# Patient Record
Sex: FEMALE | Race: BLACK OR AFRICAN AMERICAN | Employment: OTHER | URBAN - METROPOLITAN AREA
[De-identification: names, ages, dates, MRNs, and addresses within clinical notes are randomized per-mention and may not be internally consistent; named-entity substitution may affect disease eponyms.]

---

## 2012-10-08 DEVICE — BARDPORT M.R.I. IMPLANTABLE PORT WITH ATTACHABLE 9.6F OPEN-ENDED SINGLE-LUMEN VENOUS CATHETER INTERMEDIATE KIT (WITH PEEL-APART INTRODUCER)
Type: IMPLANTABLE DEVICE | Status: NON-FUNCTIONAL
Brand: BARDPORT M.R.I.
Removed: 2017-12-08

## 2017-01-03 ENCOUNTER — TELEPHONE (OUTPATIENT)
Dept: GYNECOLOGY | Age: 57
End: 2017-01-03

## 2017-01-03 NOTE — TELEPHONE ENCOUNTER
S/w pt, she states she has been feeling week, light headed and nausea last week, she states she had an xray with pulmonary doctor and show a nodule on the left side, wants to know what her next steps are

## 2017-01-04 NOTE — TELEPHONE ENCOUNTER
Per Dr. Mike Andrade, he agrees to have CT scan done, he will put in order and I advised pt that Page will call her to set up and then we will make her an appt to see Dr. Mike Andrade after Ct scan to review and go over next steps, pt verbalized understanding

## 2017-01-09 DIAGNOSIS — C56.9 MALIGNANT NEOPLASM OF OVARY, UNSPECIFIED LATERALITY (HCC): ICD-10-CM

## 2017-01-09 DIAGNOSIS — R97.1 ELEVATED CA-125: ICD-10-CM

## 2017-01-09 DIAGNOSIS — E66.01 MORBID OBESITY DUE TO EXCESS CALORIES (HCC): ICD-10-CM

## 2017-01-09 DIAGNOSIS — C80.0 CARCINOMATOSIS (HCC): Primary | ICD-10-CM

## 2017-01-10 ENCOUNTER — TELEPHONE (OUTPATIENT)
Dept: GYNECOLOGY | Age: 57
End: 2017-01-10

## 2017-01-10 NOTE — TELEPHONE ENCOUNTER
S/w pt, she states she has not heard from page to schedule her scan, I checked with Gini and she does have the order, she will call her this week with date and time, she also states that her father passed away on 1/9/17

## 2017-01-13 ENCOUNTER — HOSPITAL ENCOUNTER (OUTPATIENT)
Dept: CT IMAGING | Age: 57
Discharge: HOME | End: 2017-01-13
Attending: OBSTETRICS & GYNECOLOGY
Payer: MEDICARE

## 2017-01-13 DIAGNOSIS — C56.9 MALIGNANT NEOPLASM OF OVARY, UNSPECIFIED LATERALITY (HCC): ICD-10-CM

## 2017-01-13 DIAGNOSIS — C80.0 CARCINOMATOSIS (HCC): ICD-10-CM

## 2017-01-13 DIAGNOSIS — R97.1 ELEVATED CA-125: ICD-10-CM

## 2017-01-13 DIAGNOSIS — E66.01 MORBID OBESITY DUE TO EXCESS CALORIES (HCC): ICD-10-CM

## 2017-01-13 PROCEDURE — 74176 CT ABD & PELVIS W/O CONTRAST: CPT

## 2017-01-13 RX ORDER — VALSARTAN 160 MG/1
TABLET ORAL
Qty: 30 TAB | Refills: 11 | Status: SHIPPED | OUTPATIENT
Start: 2017-01-13 | End: 2017-08-02

## 2017-01-18 ENCOUNTER — OFFICE VISIT (OUTPATIENT)
Dept: GYNECOLOGY | Age: 57
End: 2017-01-18

## 2017-01-18 VITALS
HEIGHT: 69 IN | HEART RATE: 76 BPM | BODY MASS INDEX: 43.4 KG/M2 | DIASTOLIC BLOOD PRESSURE: 78 MMHG | WEIGHT: 293 LBS | SYSTOLIC BLOOD PRESSURE: 129 MMHG

## 2017-01-18 DIAGNOSIS — C80.0 CARCINOMATOSIS (HCC): Primary | ICD-10-CM

## 2017-01-18 DIAGNOSIS — E66.01 MORBID OBESITY DUE TO EXCESS CALORIES (HCC): ICD-10-CM

## 2017-01-18 DIAGNOSIS — C56.9 MALIGNANT NEOPLASM OF OVARY, UNSPECIFIED LATERALITY (HCC): ICD-10-CM

## 2017-01-18 DIAGNOSIS — R97.1 ELEVATED CA-125: ICD-10-CM

## 2017-01-18 RX ORDER — CIPROFLOXACIN 500 MG/1
500 TABLET ORAL 2 TIMES DAILY
Qty: 14 TAB | Refills: 0 | Status: SHIPPED | OUTPATIENT
Start: 2017-01-18 | End: 2017-02-28 | Stop reason: ALTCHOICE

## 2017-01-18 NOTE — PROGRESS NOTES
Ct scan results, pt states she is having urinary frequency, blood in her urine that is mainly present in the mornings, pain in bilateral legs

## 2017-01-18 NOTE — PROGRESS NOTES
[facility] [address], [address]  [phone]  MD Zuleyka Headley MD      Patient ID:  Name: Emmanuel Flannery  MRN: [id number]  : 1960/53 y.o. Visit date: 2017     Acute Diagnosis:   Vaginal spotting   Persistent EOC    HISTORY:  49 y.o.  female with a diagnosis of ovarian cancer. Her original surgery: 12 Ex-lap, ROULA/BSO, radical tumor debulking. Pathology:  High grade serious ovarian cancer with omental cake and carcinomatosis. Stage IIIC. She received Taxol/carboplatin (10/2012 - 2013). She recurred in mid  noted CT with Peritoneal carcinomatosis with slightly increased size of the peritoneal implants throughout the abdomen, Pericardial lymph nodes unchanged and Bilateral pelvic adenopathy increased in size. Initiated retreatment with Taxol/carboplatin (2014-2014) with HSR. She was then monitored until progression, initiated on Doxil/Avastin (3/2014), did poorly, though MRI following cycle 3 showed extensive progression. She has receive Topotecan palliation. Cycle: 2 abbreviated by thrombocytopenia. Component      Latest Ref Rng 10/17/2016 2016 2016 2016           2:48 PM  2:40 PM  3:06 PM  9:53 AM   Cancer Ag (CA) 125      0.0 - 34.0 U/mL    172.1 (H)   CA-125      0 - 30 U/mL 176 (H) 173 (H) 208 (H)      : 2016:    265    IV port placed by IRS/P IV Topotecan #2. Abbreviated secondary to myelosuppression(platelets)  Cycle three cancelled due to UTI and family death  Last chemotherapy 2016    EOD CT: 2017  FINDINGS:   Solid organ evaluation is limited without contrast.      Multiple scattered intraperitoneal soft tissue nodules are overall increased. A  representative soft tissue mass in the pelvis adjacent to the rectum measures  3.6 x 3.8 cm (series 3, image 77), previously 1.6 x 1.9 cm.  A representative  soft tissue nodule in the left hemipelvis measures 3.2 x 3.3 cm (series 3, image  70), previously 2.5 x 2.9 cm. An adjacent soft tissue nodule laterally measures  3.4 x 4.7 cm (series 3, image 72), previously 2.7 x 4.2 cm. A representative  conglomerate of soft tissue nodules adjacent to the pancreatic tail measures 3.6  x 4.8 cm (series 3, image 21), previously 3.2 x 3.4 cm.     A right inguinal lymph node measures 2.2 x 3.1 cm (series 3, image 83),  previously 1.8 x 2.6 cm.      The visualized lung bases demonstrate no mass or consolidation. The heart size  is normal. There is no pericardial or pleural effusion.     A 1.2 cm calculus is again seen in the left renal pelvis with new mild left  hydronephrosis and mild fat stranding adjacent to the left renal hilum. Several  small nonobstructing calculi are also seen bilaterally. The urinary bladder is  grossly normal.     The liver, spleen, pancreas, and adrenal glands are normal. The gall bladder is  present without intra- or extra-hepatic biliary dilatation.      There are no dilated bowel loops. There is no free fluid or free air. The aorta  tapers without aneurysm.     The uterus and ovaries are surgically absent. There is no pelvic mass. There are  stable degenerative changes in the spine. There is no aggressive blastic or  lytic bony lesion.     IMPRESSION  IMPRESSION:   1. Overall progression of metastatic disease with multiple intraperitoneal soft  tissue implants. There is no ascites. There is also increased right inguinal  lymphadenopathy.     2. At 1.2 cm calculus is again seen in the left renal collecting system. There  is new mild left hydronephrosis and inflammation adjacent to the left renal  hilum. Correlation with urinalysis for infection is suggested.     The patient reports episodic, painless vaginal spotting, stringy clots. Denies dysuria, hematuria. Urinary incontinence continues, vaginal discharge, N/V, F/C. Ongoing dental evaluation for ? ? infection.  Placed on antibiotics  Negative  and GI review for dysfunction  Negative cardiopulmonary review  No weight loss    PMH:  Past Medical History   Diagnosis Date    Anemia     Arthritis      DDD low back and knees    Asthma      uses albuterol prn only; none x 6 mos. Never hospitalized    BRCA negative 2013    Calculus of kidney     Chronic kidney disease      kidney stones    CINV (chemotherapy-induced nausea and vomiting) 2014    Diabetes ([address] Age 46     IDDM    Environmental allergies     Fibromyalgia     GERD (gastroesophageal reflux disease)      controlled with med    Hypertension     Ill-defined condition      Sepsis     Morbid obesity ([address]     Other and unspecified hyperlipidemia     Ovarian cancer ([address] 2012, 2014     serous, high grade, stage IIIc. s/p chemotx (has port)    Rectal bleeding     Thromboembolus ([address]      POST PARTUM; resolved- PELVIS    Thyroid disease      HYPOTHYROID    Unspecified sleep apnea      CPAP, Dr. Huma Bauer     PSH:  Past Surgical History   Procedure Laterality Date    Hx orthopaedic       ankle-FX, R    Hx orthopaedic       FX R ARM    Pr breast surgery procedure unlisted       cysts in breast    Hx gyn        X2    Hx hysterectomy  2012     ROULA/BSO, tumor debulking, omentectomy for ovarian cancer    Hx vascular access  2015     right chest- port placed     Medications:  Current Outpatient Prescriptions on File Prior to Visit   Medication Sig Dispense Refill    valsartan (DIOVAN) 160 mg tablet TAKE 1 TAB BY MOUTH DAILY. REPLACES VALSARTAN-HCTZ 30 Tab 11    LEVEMIR FLEXTOUCH 100 unit/mL (3 mL) inpn 30 UNITS IN THE MORNING AND 50 UNITS AT NIGHT. INCREASE AS DIRECTED TO MAX  UNITS PER DAY 30 mL 11    oxyCODONE-acetaminophen (PERCOCET 7.5) 7.5-325 mg per tablet Take 1 Tab by mouth every four (4) hours as needed for Pain. Max Daily Amount: 6 Tabs.  50 Tab 0    omega-3 acid ethyl esters (LOVAZA) 1 gram capsule TAKE ONE CAPSULE BY MOUTH TWICE A  Cap 3    buPROPion XL (WELLBUTRIN XL) 150 mg tablet Take 1 Tab by mouth every morning. 90 Tab 2    montelukast (SINGULAIR) 10 mg tablet Take 1 Tab by mouth daily. 90 Tab 2    levothyroxine (SYNTHROID) 150 mcg tablet TAKE 1 TAB BY MOUTH DAILY (BEFORE BREAKFAST). 90 Tab 3    Lancets misc Use as directed to test blood sugar three times daily-DX-DM-250.00 270 Each 3    SYMBICORT 160-4.5 mcg/actuation HFA inhaler   3    fluticasone-salmeterol (ADVAIR HFA) 230-21 mcg/actuation inhaler Take 2 Puffs by inhalation two (2) times a day. 12 g 5    BD INSULIN PEN NEEDLE UF SHORT 31 gauge x 5/16\" ndle USE WITH INSULIN TO INJECT FIVE TIMES DAILY. 200 Pen Needle 11    albuterol (PROVENTIL VENTOLIN) 2.5 mg /3 mL (0.083 %) nebulizer solution 3 mL by Nebulization route every four (4) hours as needed for Wheezing. 24 Each 11    Liraglutide (VICTOZA) 0.6 mg/0.1 mL (18 mg/3 mL) sub-q pen 0.3 mL by SubCUTAneous route daily (with lunch). 3 Each 11    NOVOLOG FLEXPEN 100 unit/mL inpn INJECT 20 UNITS BEFORE EACH MEAL + 4 UNITS FOR EVERY 50 MG/DL ABOVE 150 MG/DL.  UNITS PER DAY 30 mL 11    LORazepam (ATIVAN) 1 mg tablet Take 1 Tab by mouth every six (6) hours as needed for Anxiety. Max Daily Amount: 4 mg. Indications: CANCER CHEMOTHERAPY-INDUCED NAUSEA AND VOMITING 40 Tab 1    potassium chloride SR (KLOR-CON 10) 10 mEq tablet Take 1 Tab by mouth two (2) days a week. 30 Tab 1    cholecalciferol, VITAMIN D3, (VITAMIN D3) 5,000 unit tab tablet Take  by mouth daily.  furosemide (LASIX) 40 mg tablet Take 80 mg by mouth daily. 3    Blood-Glucose Meter monitoring kit Use as directed to test blood sugar three times daily-DX-DM-250.00 1 Kit 0    albuterol (PROVENTIL HFA, VENTOLIN HFA, PROAIR HFA) 90 mcg/actuation inhaler Take 2 Puffs by inhalation every four (4) hours as needed for Wheezing.  Bifidobacterium Infantis (ALIGN) 4 mg cap Take 1 Cap by mouth daily.       ondansetron [facility] ODT) 8 mg disintegrating tablet Take 1 Tab by mouth every eight (8) hours as needed for Nausea. 30 Tab 1    Dexlansoprazole (DEXILANT) 60 mg CpDM Take 1 Cap by mouth daily. 30 Cap 6    fexofenadine (ALLEGRA) 180 mg tablet Take 180 mg by mouth daily.  albuterol sulfate (PROVENTIL;VENTOLIN) 2.5 mg/0.5 mL Nebu 2.5 mg by Nebulization route as needed.  mometasone (NASONEX) 50 mcg/actuation nasal spray 2 sprays by Both Nostrils route as needed. No current facility-administered medications on file prior to visit. Allergies   Allergen Reactions    Carboplatin Other (comments)     Patient developed shortness of breath, felt faint, coughing, skin flushed and \"itchy\". This occurred on the patients 8th treatment.  Iodinated Contrast Media - Oral And Iv Dye Rash    Lipitor [Atorvastatin] Myalgia    Percocet [Oxycodone-Acetaminophen] Other (comments)     fever    Shellfish Containing Products Hives     ROS:  Negative    OBJECTIVE:  Vitals: Kym Keith Vitals:    01/18/17 1157   BP: 129/78   Pulse: 76   Weight: 340 lb 9.6 oz (154.5 kg)   Height: 5' 9\" (1.753 m)        Office Examination:    General appearance - alert, well appearing, and in no distress  Eyes - clear  Lymphatics - no palpable lymphadenopathy  Heart - normal rate and regular rhythm, S1 and S2 normal  Pulmonary - Clear to ausculation and percussion  Abdomen - rotund, soft, nontender, nondistended, no masses or organomegaly/fluid wave  Pelvic - Vulva: No gross lesion, BUS negative   Vagina: No lesion, blood. No suspicious nodularity or induration. Incontinent urine clean. Bimanual: No suspicious mass, tenderness---limited examination  Rectal - rectal exam not indicated  Back exam - no CVAT  Extrem: Mild upper extremity edema, L>R, good pulses.  Nontender      DATE REVIEW as available:  Lab Results   Component Value Date/Time    WBC 9.0 12/12/2016 01:54 PM    HGB 9.0 12/12/2016 01:54 PM    HCT 28.8 12/12/2016 01:54 PM    PLATELET 346 12/12/2016 01:54 PM    MCV 90.9 12/12/2016 01:54 PM     Lab Results   Component Value Date/Time    Sodium 145 12/12/2016 01:54 PM    Potassium 4.5 12/12/2016 01:54 PM    Chloride 111 12/12/2016 01:54 PM    CO2 24 12/12/2016 01:54 PM    Anion gap 10 12/12/2016 01:54 PM    Glucose 151 12/12/2016 01:54 PM    BUN 28 12/12/2016 01:54 PM    Creatinine 1.47 12/12/2016 01:54 PM    BUN/Creatinine ratio 19 12/12/2016 01:54 PM    GFR est AA 45 12/12/2016 01:54 PM    GFR est non-AA 37 12/12/2016 01:54 PM    Calcium 8.8 12/12/2016 01:54 PM    Bilirubin, total 0.3 12/12/2016 01:54 PM    ALT 29 12/12/2016 01:54 PM    AST 36 12/12/2016 01:54 PM    Alk. phosphatase 99 12/12/2016 01:54 PM    Protein, total 8.6 12/12/2016 01:54 PM    Albumin 3.2 12/12/2016 01:54 PM    Globulin 5.4 12/12/2016 01:54 PM    A-G Ratio 0.6 12/12/2016 01:54 PM         IMPRESSION AND PLAN:    Melina Bales has a working diagnosis of EOC. EOD CT and  elevations would be consistent with recurrence. She is S/P six cycles of Carbo/Taxol retreatment due to prolonged DFI. Elevated . CT suggestive of progressive disease. S/P Doxil/Avastin #3. S/P Topotecan #3, complicated by anemia and thrombocytopenia  No further chemotherapy since 4/2016    Recommend resumption of chemotherapy  With review of her chemotherapy history I have recommended IV Gemzar/Avastin. Initially start Gemzar on day 1 only due to history of myelosuppression  Toxicities noted  Palliative nature of treatment noted    All questions answered.   Will schedule    UTI: Rx--      Sara Machuca MD  1/18/2017

## 2017-01-20 ENCOUNTER — TELEPHONE (OUTPATIENT)
Dept: GYNECOLOGY | Age: 57
End: 2017-01-20

## 2017-01-20 LAB
ALBUMIN SERPL-MCNC: 3.8 G/DL (ref 3.5–5.5)
ALBUMIN/GLOB SERPL: 0.9 {RATIO} (ref 1.1–2.5)
ALP SERPL-CCNC: 78 IU/L (ref 39–117)
ALT SERPL-CCNC: 19 IU/L (ref 0–32)
AST SERPL-CCNC: 27 IU/L (ref 0–40)
BASOPHILS # BLD AUTO: 0 X10E3/UL (ref 0–0.2)
BASOPHILS NFR BLD AUTO: 0 %
BILIRUB SERPL-MCNC: 0.2 MG/DL (ref 0–1.2)
BUN SERPL-MCNC: 36 MG/DL (ref 6–24)
BUN/CREAT SERPL: 29 (ref 9–23)
CALCIUM SERPL-MCNC: 9.5 MG/DL (ref 8.7–10.2)
CANCER AG125 SERPL-ACNC: 353.3 U/ML (ref 0–38.1)
CHLORIDE SERPL-SCNC: 106 MMOL/L (ref 96–106)
CO2 SERPL-SCNC: 20 MMOL/L (ref 18–29)
CREAT SERPL-MCNC: 1.24 MG/DL (ref 0.57–1)
EOSINOPHIL # BLD AUTO: 0.2 X10E3/UL (ref 0–0.4)
EOSINOPHIL NFR BLD AUTO: 2 %
ERYTHROCYTE [DISTWIDTH] IN BLOOD BY AUTOMATED COUNT: 14.3 % (ref 12.3–15.4)
GLOBULIN SER CALC-MCNC: 4.4 G/DL (ref 1.5–4.5)
GLUCOSE SERPL-MCNC: 160 MG/DL (ref 65–99)
HCT VFR BLD AUTO: 29.3 % (ref 34–46.6)
HGB BLD-MCNC: 9.2 G/DL (ref 11.1–15.9)
IMM GRANULOCYTES # BLD: 0 X10E3/UL (ref 0–0.1)
IMM GRANULOCYTES NFR BLD: 0 %
LYMPHOCYTES # BLD AUTO: 2.8 X10E3/UL (ref 0.7–3.1)
LYMPHOCYTES NFR BLD AUTO: 30 %
MCH RBC QN AUTO: 28 PG (ref 26.6–33)
MCHC RBC AUTO-ENTMCNC: 31.4 G/DL (ref 31.5–35.7)
MCV RBC AUTO: 89 FL (ref 79–97)
MONOCYTES # BLD AUTO: 0.5 X10E3/UL (ref 0.1–0.9)
MONOCYTES NFR BLD AUTO: 5 %
NEUTROPHILS # BLD AUTO: 5.8 X10E3/UL (ref 1.4–7)
NEUTROPHILS NFR BLD AUTO: 63 %
PLATELET # BLD AUTO: 227 X10E3/UL (ref 150–379)
POTASSIUM SERPL-SCNC: 4.5 MMOL/L (ref 3.5–5.2)
PROT SERPL-MCNC: 8.2 G/DL (ref 6–8.5)
RBC # BLD AUTO: 3.28 X10E6/UL (ref 3.77–5.28)
SODIUM SERPL-SCNC: 141 MMOL/L (ref 134–144)
WBC # BLD AUTO: 9.3 X10E3/UL (ref 3.4–10.8)

## 2017-01-20 RX ORDER — ONDANSETRON 8 MG/1
8 TABLET, ORALLY DISINTEGRATING ORAL
Qty: 30 TAB | Refills: 1 | Status: SHIPPED | OUTPATIENT
Start: 2017-01-20 | End: 2017-08-02 | Stop reason: SDUPTHER

## 2017-01-20 NOTE — TELEPHONE ENCOUNTER
Requested Prescriptions     Signed Prescriptions Disp Refills    ondansetron (ZOFRAN ODT) 8 mg disintegrating tablet 30 Tab 1     Sig: Take 1 Tab by mouth every eight (8) hours as needed for Nausea.      Authorizing Provider: Jessie Cook     Ordering User: Yobany Middleton     Rx sent to pharmacy per vo Dr. Allen Bee for nausea during chemo

## 2017-01-23 DIAGNOSIS — C56.9 OVARIAN CANCER, UNSPECIFIED LATERALITY (HCC): Primary | ICD-10-CM

## 2017-01-27 ENCOUNTER — TELEPHONE (OUTPATIENT)
Dept: GYNECOLOGY | Age: 57
End: 2017-01-27

## 2017-01-27 NOTE — TELEPHONE ENCOUNTER
Pt informed her insurance denied Gemzar/Avastin as she has had it in the past and they feel she failed it. Therefore Dr. Vandana Estrella is ordering Gemzar D1 and D8 every 28 days. I will give pt a written copy of schedule when she comes in to sign consent for Gemzar.

## 2017-01-31 LAB
ALBUMIN SERPL-MCNC: 4 G/DL (ref 3.5–5.5)
ALBUMIN/GLOB SERPL: 0.9 {RATIO} (ref 1.1–2.5)
ALP SERPL-CCNC: 76 IU/L (ref 39–117)
ALT SERPL-CCNC: 18 IU/L (ref 0–32)
APPEARANCE UR: ABNORMAL
AST SERPL-CCNC: 30 IU/L (ref 0–40)
BACTERIA #/AREA URNS HPF: ABNORMAL /[HPF]
BASOPHILS # BLD AUTO: 0 X10E3/UL (ref 0–0.2)
BASOPHILS NFR BLD AUTO: 0 %
BILIRUB SERPL-MCNC: 0.3 MG/DL (ref 0–1.2)
BILIRUB UR QL STRIP: NEGATIVE
BUN SERPL-MCNC: 23 MG/DL (ref 6–24)
BUN/CREAT SERPL: 20 (ref 9–23)
CALCIUM SERPL-MCNC: 9.6 MG/DL (ref 8.7–10.2)
CANCER AG125 SERPL-ACNC: 347.1 U/ML (ref 0–38.1)
CASTS URNS MICRO: ABNORMAL
CASTS URNS QL MICRO: PRESENT /LPF
CHLORIDE SERPL-SCNC: 104 MMOL/L (ref 96–106)
CO2 SERPL-SCNC: 19 MMOL/L (ref 18–29)
COLOR UR: ABNORMAL
CREAT SERPL-MCNC: 1.17 MG/DL (ref 0.57–1)
EOSINOPHIL # BLD AUTO: 0.3 X10E3/UL (ref 0–0.4)
EOSINOPHIL NFR BLD AUTO: 3 %
EPI CELLS #/AREA URNS HPF: ABNORMAL /HPF
ERYTHROCYTE [DISTWIDTH] IN BLOOD BY AUTOMATED COUNT: 14.2 % (ref 12.3–15.4)
GLOBULIN SER CALC-MCNC: 4.5 G/DL (ref 1.5–4.5)
GLUCOSE SERPL-MCNC: 147 MG/DL (ref 65–99)
GLUCOSE UR QL: NEGATIVE
HCT VFR BLD AUTO: 29.8 % (ref 34–46.6)
HGB BLD-MCNC: 9.5 G/DL (ref 11.1–15.9)
HGB UR QL STRIP: ABNORMAL
IMM GRANULOCYTES # BLD: 0 X10E3/UL (ref 0–0.1)
IMM GRANULOCYTES NFR BLD: 0 %
KETONES UR QL STRIP: NEGATIVE
LEUKOCYTE ESTERASE UR QL STRIP: ABNORMAL
LYMPHOCYTES # BLD AUTO: 2.9 X10E3/UL (ref 0.7–3.1)
LYMPHOCYTES NFR BLD AUTO: 31 %
MCH RBC QN AUTO: 28.3 PG (ref 26.6–33)
MCHC RBC AUTO-ENTMCNC: 31.9 G/DL (ref 31.5–35.7)
MCV RBC AUTO: 89 FL (ref 79–97)
MICRO URNS: ABNORMAL
MONOCYTES # BLD AUTO: 0.7 X10E3/UL (ref 0.1–0.9)
MONOCYTES NFR BLD AUTO: 7 %
MUCOUS THREADS URNS QL MICRO: PRESENT
NEUTROPHILS # BLD AUTO: 5.5 X10E3/UL (ref 1.4–7)
NEUTROPHILS NFR BLD AUTO: 59 %
NITRITE UR QL STRIP: NEGATIVE
PH UR STRIP: 6 [PH] (ref 5–7.5)
PLATELET # BLD AUTO: 222 X10E3/UL (ref 150–379)
POTASSIUM SERPL-SCNC: 4.5 MMOL/L (ref 3.5–5.2)
PROT SERPL-MCNC: 8.5 G/DL (ref 6–8.5)
PROT UR QL STRIP: ABNORMAL
RBC # BLD AUTO: 3.36 X10E6/UL (ref 3.77–5.28)
RBC #/AREA URNS HPF: >30 /HPF
SODIUM SERPL-SCNC: 140 MMOL/L (ref 134–144)
SP GR UR: 1.02 (ref 1–1.03)
UROBILINOGEN UR STRIP-MCNC: 0.2 MG/DL (ref 0.2–1)
WBC # BLD AUTO: 9.4 X10E3/UL (ref 3.4–10.8)
WBC #/AREA URNS HPF: >30 /HPF

## 2017-01-31 RX ORDER — DEXAMETHASONE SODIUM PHOSPHATE 4 MG/ML
8 INJECTION, SOLUTION INTRA-ARTICULAR; INTRALESIONAL; INTRAMUSCULAR; INTRAVENOUS; SOFT TISSUE ONCE
Status: COMPLETED | OUTPATIENT
Start: 2017-02-02 | End: 2017-02-02

## 2017-01-31 RX ORDER — GRANISETRON HYDROCHLORIDE 1 MG/ML
1 INJECTION, SOLUTION INTRAVENOUS ONCE
Status: COMPLETED | OUTPATIENT
Start: 2017-02-02 | End: 2017-02-02

## 2017-02-01 ENCOUNTER — TELEPHONE (OUTPATIENT)
Dept: GYNECOLOGY | Age: 57
End: 2017-02-01

## 2017-02-02 ENCOUNTER — HOSPITAL ENCOUNTER (OUTPATIENT)
Dept: INFUSION THERAPY | Age: 57
Discharge: HOME | End: 2017-02-02
Payer: MEDICARE

## 2017-02-02 VITALS
HEART RATE: 67 BPM | SYSTOLIC BLOOD PRESSURE: 128 MMHG | DIASTOLIC BLOOD PRESSURE: 76 MMHG | WEIGHT: 293 LBS | OXYGEN SATURATION: 93 % | BODY MASS INDEX: 43.4 KG/M2 | RESPIRATION RATE: 18 BRPM | TEMPERATURE: 97 F | HEIGHT: 69 IN

## 2017-02-02 PROCEDURE — 74011000250 HC RX REV CODE- 250

## 2017-02-02 PROCEDURE — 74011000250 HC RX REV CODE- 250: Performed by: OBSTETRICS & GYNECOLOGY

## 2017-02-02 PROCEDURE — 74011250636 HC RX REV CODE- 250/636

## 2017-02-02 PROCEDURE — 96413 CHEMO IV INFUSION 1 HR: CPT

## 2017-02-02 PROCEDURE — 77030012965 HC NDL HUBR BBMI -A

## 2017-02-02 PROCEDURE — 74011250636 HC RX REV CODE- 250/636: Performed by: OBSTETRICS & GYNECOLOGY

## 2017-02-02 PROCEDURE — 96375 TX/PRO/DX INJ NEW DRUG ADDON: CPT

## 2017-02-02 RX ORDER — OXYCODONE AND ACETAMINOPHEN 7.5; 325 MG/1; MG/1
1 TABLET ORAL
Qty: 50 TAB | Refills: 0 | Status: SHIPPED | OUTPATIENT
Start: 2017-02-02 | End: 2017-03-12

## 2017-02-02 RX ORDER — HEPARIN 100 UNIT/ML
500 SYRINGE INTRAVENOUS AS NEEDED
Status: ACTIVE | OUTPATIENT
Start: 2017-02-02 | End: 2017-02-03

## 2017-02-02 RX ORDER — SODIUM CHLORIDE 9 MG/ML
10 INJECTION INTRAMUSCULAR; INTRAVENOUS; SUBCUTANEOUS AS NEEDED
Status: ACTIVE | OUTPATIENT
Start: 2017-02-02 | End: 2017-02-03

## 2017-02-02 RX ORDER — SODIUM CHLORIDE 9 MG/ML
25 INJECTION, SOLUTION INTRAVENOUS AS NEEDED
Status: DISPENSED | OUTPATIENT
Start: 2017-02-02 | End: 2017-02-03

## 2017-02-02 RX ORDER — SODIUM CHLORIDE 0.9 % (FLUSH) 0.9 %
10-40 SYRINGE (ML) INJECTION AS NEEDED
Status: ACTIVE | OUTPATIENT
Start: 2017-02-02 | End: 2017-02-03

## 2017-02-02 RX ADMIN — FAMOTIDINE 20 MG: 10 INJECTION INTRAVENOUS at 09:57

## 2017-02-02 RX ADMIN — SODIUM CHLORIDE 2192 MG: 900 INJECTION, SOLUTION INTRAVENOUS at 10:55

## 2017-02-02 RX ADMIN — Medication 10 ML: at 11:30

## 2017-02-02 RX ADMIN — SODIUM CHLORIDE 25 ML/HR: 900 INJECTION, SOLUTION INTRAVENOUS at 09:25

## 2017-02-02 RX ADMIN — Medication 500 UNITS: at 11:30

## 2017-02-02 RX ADMIN — Medication 10 ML: at 09:25

## 2017-02-02 RX ADMIN — SODIUM CHLORIDE 10 ML: 9 INJECTION, SOLUTION INTRAMUSCULAR; INTRAVENOUS; SUBCUTANEOUS at 09:25

## 2017-02-02 RX ADMIN — GRANISETRON HYDROCHLORIDE 1 MG: 1 INJECTION, SOLUTION INTRAVENOUS at 10:29

## 2017-02-02 RX ADMIN — DEXAMETHASONE SODIUM PHOSPHATE 8 MG: 4 INJECTION, SOLUTION INTRA-ARTICULAR; INTRALESIONAL; INTRAMUSCULAR; INTRAVENOUS; SOFT TISSUE at 10:14

## 2017-02-02 NOTE — PROGRESS NOTES
8047 Pt admit to [facility] for C1D1 Gemzar ambulatory in stable condition. Assessment completed. No new concerns voiced. Port accessed and flushed with positive blood return. NO labs drawn today, labs drawn prior to admission. Normal Saline started at [facility]. Medication ordered. Pre medications given. Patient education given for new medication, patient verbalized understanding. 1140 seen at chair side by Isaac Judge NP    Visit Vitals    /60    Pulse 82    Temp 97.2 °F (36.2 °C)    Resp 18    Ht 5' 8.5\" (1.74 m)    Wt 155.1 kg (342 lb)    LMP 09/07/2011    SpO2 94%    Breastfeeding No    BMI 51.24 kg/m2       Medications:  Normal Saline   Pepcid  Dexamethasone  Granisetron   Gemzar  Heparin Flush    1145 Pt tolerated treatment well. Port maintained positive blood return throughout treatment, flushed with positive blood return at conclusion and heparinized and de accessed. D/c home ambulatory in no distress. Pt aware of next appointment scheduled for 2/9/17.

## 2017-02-02 NOTE — PROGRESS NOTES
[address]  Medical Oncology       Date of visit: 2/2/2017       Subjective:   Ms. Gin Paz is a long time pt of Dr. Mo Freeman. She was initially dx with ovarian cancer in Sept 2012. She had MARAH/BSO/Cytoreductive surgery on 9/21/12. At that time she was Stage III C. She recovered and began 6 cycles of Carbo/Taxol. Had good response, but reoccurred and in Fall of 2014 she received 3 cycles of Carbo/Taxol before having a reaction to Carbo and this was stopped. She continued her next 3 cycles with Doxil/Avastin. This did not decrease disease. .   She had a treatment break from 3/2015 until she restarted with Topatecan for 11 cycles from July 2015 through March 2016. She was followed by Dr. Mo Freeman and in Jan 2017, she had a CT scan that showed:   IMPRESSION:   1. Overall progression of metastatic disease with multiple intraperitoneal soft  tissue implants. There is no ascites. There is also increased right inguinal  lymphadenopathy. At this, pt agreed to proceed with new regimen. Dr. Mo Freeman recommended Gemzar single agent. She is here today for her first round of treatment with Gemzar  She has reviewed her teachin of Gemzar and denies questions at present. Of note, she has been found on CT to have:  At 1.2 cm calculus is again seen in the left renal collecting system. There is new mild left hydronephrosis and inflammation adjacent to the left renal hilum. Correlation with urinalysis for infection is suggested.e  renal stones and her urinalysis showed. She has an appointment with her urologist next week to follow up on this. Her son is with her today and supportive. Diagnosis: Ovarian cancer  Original Surgery: 9/21/12 Ex-lap, ROULA/BSO, radical tumor debulking  Pathology: High grade serious ovarian cancer with omental cake and carcinomatosis.  Stage IIIC  Preop CA-125: 147 units      Current Outpatient Prescriptions   Medication Sig    oxyCODONE-acetaminophen (PERCOCET 7.5) 7.5-325 mg per tablet Take 1 Tab by mouth every four (4) hours as needed for Pain. Max Daily Amount: 6 Tabs.  ondansetron (ZOFRAN ODT) 8 mg disintegrating tablet Take 1 Tab by mouth every eight (8) hours as needed for Nausea.  ciprofloxacin HCl (CIPRO) 500 mg tablet Take 1 Tab by mouth two (2) times a day.  valsartan (DIOVAN) 160 mg tablet TAKE 1 TAB BY MOUTH DAILY. REPLACES VALSARTAN-HCTZ    LEVEMIR FLEXTOUCH 100 unit/mL (3 mL) inpn 30 UNITS IN THE MORNING AND 50 UNITS AT NIGHT. INCREASE AS DIRECTED TO MAX  UNITS PER DAY    omega-3 acid ethyl esters (LOVAZA) 1 gram capsule TAKE ONE CAPSULE BY MOUTH TWICE A DAY    buPROPion XL (WELLBUTRIN XL) 150 mg tablet Take 1 Tab by mouth every morning.  montelukast (SINGULAIR) 10 mg tablet Take 1 Tab by mouth daily.  levothyroxine (SYNTHROID) 150 mcg tablet TAKE 1 TAB BY MOUTH DAILY (BEFORE BREAKFAST).  Lancets misc Use as directed to test blood sugar three times daily-DX-DM-250.00    SYMBICORT 160-4.5 mcg/actuation HFA inhaler     fluticasone-salmeterol (ADVAIR HFA) 230-21 mcg/actuation inhaler Take 2 Puffs by inhalation two (2) times a day.  BD INSULIN PEN NEEDLE UF SHORT 31 gauge x 5/16\" ndle USE WITH INSULIN TO INJECT FIVE TIMES DAILY.  albuterol (PROVENTIL VENTOLIN) 2.5 mg /3 mL (0.083 %) nebulizer solution 3 mL by Nebulization route every four (4) hours as needed for Wheezing.  Liraglutide (VICTOZA) 0.6 mg/0.1 mL (18 mg/3 mL) sub-q pen 0.3 mL by SubCUTAneous route daily (with lunch).  NOVOLOG FLEXPEN 100 unit/mL inpn INJECT 20 UNITS BEFORE EACH MEAL + 4 UNITS FOR EVERY 50 MG/DL ABOVE 150 MG/DL.  UNITS PER DAY    LORazepam (ATIVAN) 1 mg tablet Take 1 Tab by mouth every six (6) hours as needed for Anxiety. Max Daily Amount: 4 mg. Indications: CANCER CHEMOTHERAPY-INDUCED NAUSEA AND VOMITING    potassium chloride SR (KLOR-CON 10) 10 mEq tablet Take 1 Tab by mouth two (2) days a week.     cholecalciferol, VITAMIN D3, (VITAMIN D3) 5,000 unit tab tablet Take  by mouth daily.  furosemide (LASIX) 40 mg tablet Take 80 mg by mouth daily.  Blood-Glucose Meter monitoring kit Use as directed to test blood sugar three times daily-DX-DM-250.00    albuterol (PROVENTIL HFA, VENTOLIN HFA, PROAIR HFA) 90 mcg/actuation inhaler Take 2 Puffs by inhalation every four (4) hours as needed for Wheezing.  Bifidobacterium Infantis (ALIGN) 4 mg cap Take 1 Cap by mouth daily.  Dexlansoprazole (DEXILANT) 60 mg CpDM Take 1 Cap by mouth daily.  fexofenadine (ALLEGRA) 180 mg tablet Take 180 mg by mouth daily.  albuterol sulfate (PROVENTIL;VENTOLIN) 2.5 mg/0.5 mL Nebu 2.5 mg by Nebulization route as needed.  mometasone (NASONEX) 50 mcg/actuation nasal spray 2 sprays by Both Nostrils route as needed. Current Facility-Administered Medications   Medication Dose Route Frequency    sodium chloride 0.9 % injection 10 mL  10 mL IntraVENous PRN    heparin (porcine) pf 500 Units  500 Units IntraVENous PRN    sodium chloride (NS) flush 10-40 mL  10-40 mL IntraVENous PRN    0.9% sodium chloride infusion  25 mL/hr IntraVENous PRN        Review of Systems:  General: Denies wt loss, fatigue  HEENT: Denies visual changes, dysphagia or headache  Resp: Denies SOB, ECKERT, wheezing or cough  CV: Denies CP, palpitations  GI/: Denies N/V, diarrhea, constipation or dysuria  [place]el: her main complaint today is her DJD knee pain. She said she was told she needed a TKR, but has not gotten it yet. Neuro: Denies dizzyness or syncope    Objective:     Patient Vitals for the past 8 hrs:   BP Temp Pulse Resp SpO2 Height Weight   02/02/17 1141 128/76 97 °F (36.1 °C) 67 18 93 % - -   02/02/17 1110 115/68 - 64 18 - - -   02/02/17 0905 101/60 97.2 °F (36.2 °C) 82 18 94 % - -   02/02/17 0903 - - - - - 5' 8.5\" (1.74 m) 342 lb (155.1 kg)       Physical Exam:  General: A&O X3 in NAD  HEENT: Sclera anicteric, Mucosa pink, moist   Neck: No JVD or bruits bilat.  No cervical adenopathy appreciated. Heart: Regular without M/R/G  Lungs: CTA Bilat without wheezing or rales   Abd: Soft, obese, NT/ND with + BS throughout  Ext: Without edema and + pedal pulses bilat  Neuro: grossly intact    Pathology:   FINAL PATHOLOGIC DIAGNOSIS  1. Omentum, omentectomy:  Serous carcinoma, high grade  2. Soft tissue, umbilicus, biopsy:  Serous carcinoma, high grade  3. Peritoneum, periappendiceal, biopsy:  Serous carcinoma, high grade  4. Colonic mesentery, biopsy:  Serous carcinoma, high grade  See comment  5. Uterus (157 grams), ovaries and fallopian tubes, supracervical hysterectomy and bilateral salpingooophorectomy:  Cervix: No diagnostic pathologic abnormality  Myometrium: No diagnostic pathologic abnormality  Endometrium: Benign polyp and cystic atrophy  Ovaries and fallopian tubes: Serous carcinoma, high grade  Serosa: Serous carcinoma implants, high-grade  See synoptic report  Synoptic Report:  Specimen: Uterus, ovaries and fallopian tubes, omentum  Procedure: Supracervical hysterectomy, bilateral salpingo-oophorectomy, omentectomy  Lymph node sampling: No lymph nodes present  Specimen integrity:  Right ovary: Intact        Assessment:     Patient Active Problem List   Diagnosis Code    Histor of ovarian cancer Z85.43    Carcinomatosis ([facility] [address] C80.0    Type II diabetes mellitus, uncontrolled ([address] E11.65    Morbid obesity ([address] E66.01    Abnormal mammogram R92.8    Sleep apnea G47.30    Dyslipidemia E78.5    Benign essential hypertension I10    Disorder of thyroid E07.9    CINV (chemotherapy-induced nausea and vomiting) R11.2, T45.1X5A    Swelling of eye, bilateral--left > right H57.8    Portacath in place--left Z95.828    Chest pain, atypical R07.89    Hx of pulmonary embolus during pregnancy Z86.711, Z87.59    Elevated CA-125 R97.1    Ovarian cancer (HCC) C56.9         Plan:    Will proceed with first cycle of Gemzar  She will follow up with urologist regarding hydronephrosis/?stone abnormal urinalysis.   Follow up with PCP re-DM/HTN/Lipids  Continue current meds  Hydration encouraged  Encouraged to call for any concerns or questions  Leopoldo Marin NP

## 2017-02-02 NOTE — TELEPHONE ENCOUNTER
Pt is currently in [facility] receiving chemo, pt's son walked into office requesting refill for pt  Requested Prescriptions     Pending Prescriptions Disp Refills    oxyCODONE-acetaminophen (PERCOCET 7.5) 7.5-325 mg per tablet 50 Tab 0     Sig: Take 1 Tab by mouth every four (4) hours as needed for Pain. Max Daily Amount: 6 Tabs.

## 2017-02-06 ENCOUNTER — APPOINTMENT (OUTPATIENT)
Dept: INFUSION THERAPY | Age: 57
End: 2017-02-06
Payer: MEDICARE

## 2017-02-07 RX ORDER — GRANISETRON HYDROCHLORIDE 1 MG/ML
1 INJECTION, SOLUTION INTRAVENOUS ONCE
Status: ACTIVE | OUTPATIENT
Start: 2017-02-09 | End: 2017-02-09

## 2017-02-08 ENCOUNTER — TELEPHONE (OUTPATIENT)
Dept: GYNECOLOGY | Age: 57
End: 2017-02-08

## 2017-02-08 ENCOUNTER — DOCUMENTATION ONLY (OUTPATIENT)
Dept: GYNECOLOGY | Age: 57
End: 2017-02-08

## 2017-02-08 NOTE — TELEPHONE ENCOUNTER
Patient recently saw her doctor and he stated she may have an infection. Chemo may need to be postponed for tomorrow.

## 2017-02-08 NOTE — TELEPHONE ENCOUNTER
Per pt, she saw Dr. Yoana Leach today and is being treated for a UTI, and wants to r/o sepsis, going for scan as her feels she has developed right side stones, and her left stone has gotten larger. Dr. Yoana Leach will f/u with pt tomorrow to discuss possible sx procedure. Pt canceling D8 of Gemzar for 2/9/17 and will see Dr. Yohannes Mcdowell on next pre chemo visit. Encouraged pt to keep us updated.

## 2017-02-08 NOTE — PROGRESS NOTES
Email sent to Bruce Carr in [facility] to cancel pt for 2/9/17 C1D8 Gemzar d/t a UTI, kidney stones. To leave all other appointments on schedule.

## 2017-02-09 ENCOUNTER — HOSPITAL ENCOUNTER (OUTPATIENT)
Dept: INFUSION THERAPY | Age: 57
Discharge: HOME | End: 2017-02-09
Payer: MEDICARE

## 2017-02-10 ENCOUNTER — TELEPHONE (OUTPATIENT)
Dept: GYNECOLOGY | Age: 57
End: 2017-02-10

## 2017-02-10 NOTE — TELEPHONE ENCOUNTER
Pt  to state that Dr. Laz Fajardo, urologist wants to perform a procedure on 2/15/17,is this ok with Dr. Nba Gutierrez. Per Dr. Nba Gutierrez, it is ok for her to have a procedure.   Pt was called and notified, pt verbalized understanding

## 2017-02-13 ENCOUNTER — TELEPHONE (OUTPATIENT)
Dept: INTERNAL MEDICINE CLINIC | Age: 57
End: 2017-02-13

## 2017-02-13 ENCOUNTER — HOSPITAL ENCOUNTER (OUTPATIENT)
Dept: PREADMISSION TESTING | Age: 57
Discharge: HOME | End: 2017-02-13
Attending: UROLOGY
Payer: MEDICARE

## 2017-02-13 VITALS
TEMPERATURE: 98.2 F | WEIGHT: 293 LBS | BODY MASS INDEX: 43.4 KG/M2 | HEART RATE: 71 BPM | SYSTOLIC BLOOD PRESSURE: 133 MMHG | HEIGHT: 69 IN | DIASTOLIC BLOOD PRESSURE: 64 MMHG | RESPIRATION RATE: 24 BRPM | OXYGEN SATURATION: 96 %

## 2017-02-13 LAB
ALBUMIN SERPL BCP-MCNC: 3.3 G/DL (ref 3.5–5)
ALBUMIN/GLOB SERPL: 0.7 {RATIO} (ref 1.1–2.2)
ALP SERPL-CCNC: 81 U/L (ref 45–117)
ALT SERPL-CCNC: 36 U/L (ref 12–78)
ANION GAP BLD CALC-SCNC: 7 MMOL/L (ref 5–15)
APPEARANCE UR: ABNORMAL
AST SERPL W P-5'-P-CCNC: 37 U/L (ref 15–37)
BACTERIA URNS QL MICRO: NEGATIVE /HPF
BILIRUB SERPL-MCNC: 0.3 MG/DL (ref 0.2–1)
BILIRUB UR QL: NEGATIVE
BUN SERPL-MCNC: 24 MG/DL (ref 6–20)
BUN/CREAT SERPL: 18 (ref 12–20)
CALCIUM SERPL-MCNC: 9.1 MG/DL (ref 8.5–10.1)
CHLORIDE SERPL-SCNC: 110 MMOL/L (ref 97–108)
CO2 SERPL-SCNC: 27 MMOL/L (ref 21–32)
COLOR UR: ABNORMAL
CREAT SERPL-MCNC: 1.36 MG/DL (ref 0.55–1.02)
EPITH CASTS URNS QL MICRO: ABNORMAL /LPF
ERYTHROCYTE [DISTWIDTH] IN BLOOD BY AUTOMATED COUNT: 14.3 % (ref 11.5–14.5)
GLOBULIN SER CALC-MCNC: 5 G/DL (ref 2–4)
GLUCOSE SERPL-MCNC: 134 MG/DL (ref 65–100)
GLUCOSE UR STRIP.AUTO-MCNC: NEGATIVE MG/DL
HCT VFR BLD AUTO: 27.5 % (ref 35–47)
HGB BLD-MCNC: 8.4 G/DL (ref 11.5–16)
HGB UR QL STRIP: ABNORMAL
HYALINE CASTS URNS QL MICRO: ABNORMAL /LPF (ref 0–5)
KETONES UR QL STRIP.AUTO: NEGATIVE MG/DL
LEUKOCYTE ESTERASE UR QL STRIP.AUTO: ABNORMAL
MCH RBC QN AUTO: 27.6 PG (ref 26–34)
MCHC RBC AUTO-ENTMCNC: 30.5 G/DL (ref 30–36.5)
MCV RBC AUTO: 90.5 FL (ref 80–99)
NITRITE UR QL STRIP.AUTO: NEGATIVE
PH UR STRIP: 5.5 [PH] (ref 5–8)
PLATELET # BLD AUTO: 130 K/UL (ref 150–400)
POTASSIUM SERPL-SCNC: 4.5 MMOL/L (ref 3.5–5.1)
PROT SERPL-MCNC: 8.3 G/DL (ref 6.4–8.2)
PROT UR STRIP-MCNC: 100 MG/DL
RBC # BLD AUTO: 3.04 M/UL (ref 3.8–5.2)
RBC #/AREA URNS HPF: >100 /HPF (ref 0–5)
SODIUM SERPL-SCNC: 144 MMOL/L (ref 136–145)
SP GR UR REFRACTOMETRY: 1.02 (ref 1–1.03)
UROBILINOGEN UR QL STRIP.AUTO: 0.2 EU/DL (ref 0.2–1)
WBC # BLD AUTO: 7.6 K/UL (ref 3.6–11)
WBC URNS QL MICRO: >100 /HPF (ref 0–4)

## 2017-02-13 PROCEDURE — 87086 URINE CULTURE/COLONY COUNT: CPT | Performed by: UROLOGY

## 2017-02-13 PROCEDURE — 93005 ELECTROCARDIOGRAM TRACING: CPT

## 2017-02-13 PROCEDURE — 36415 COLL VENOUS BLD VENIPUNCTURE: CPT | Performed by: UROLOGY

## 2017-02-13 PROCEDURE — 80053 COMPREHEN METABOLIC PANEL: CPT | Performed by: UROLOGY

## 2017-02-13 PROCEDURE — 81001 URINALYSIS AUTO W/SCOPE: CPT | Performed by: UROLOGY

## 2017-02-13 PROCEDURE — 85027 COMPLETE CBC AUTOMATED: CPT | Performed by: UROLOGY

## 2017-02-13 NOTE — PERIOP NOTES
Reviewed pre-op EKG/comparison EKG 2015 with Dr. Griselda Delong recent chest pain few days ago (instructed to follow up with cardiologist when chest pain occurred). Asymptomatic today with PAT visit. Dr. Ni Rapp requested clearance before surgery. Left message with Dr. Thomason Areas office concerning patient's recent episode of chest pain (few days ago).

## 2017-02-13 NOTE — PERIOP NOTES
[facility]  Preoperative Instructions        Surgery Date 2/15/2017        Time of Arrival 5:45 AM    1. On the day of your surgery, please report to the Surgical Services Registration Desk and sign in at your designated time. The Surgery Center is located to the right of the Emergency Room. 2. You must have someone with you to drive you home. You should not drive a car for 24 hours following surgery. Please make arrangements for a friend or family member to stay with you for the first 24 hours after your surgery. 3. Do not have anything to eat or drink (including water, gum, mints, coffee, juice) after midnight 2/14/2017              . This may not apply to medications prescribed by your physician. Please note special instructions, if applicable. If you are currently taking Plavix, Coumadin, or other blood-thinning agents, contact your surgeon for instructions. 4. We recommend you do not drink any alcoholic beverages for 24 hours before and after your surgery. 5. Have a list of all current medications, including vitamins, herbal supplements and any other over the counter medications. Stop all Aspirin and non-steroidal anti-inflammatory drugs (I.e. Advil, Aleve), as directed by your surgeon's office. Stop all vitamins and herbal supplements seven days prior to your surgery. 6. Wear comfortable clothes. Wear glasses instead of contacts. Do not bring any money or jewelry. Please bring picture ID, insurance card, and any prearranged co-payment or hospital payment. Do not wear make-up, particularly mascara the morning of your surgery. Do not wear nail polish, particularly if you are having foot /hand surgery. Wear your hair loose or down, no ponytails, buns, charles pins or clips. All body piercings must be removed.   Please shower with antibacterial soap for three consecutive days before and on the morning of surgery, but do not apply any lotions, powders or deodorants after the shower on the day of surgery. Please use a fresh towels after each shower. Please sleep in clean clothes and change bed linens the night before surgery. Please do not shave for 48 hours prior to surgery. Shaving of the face is acceptable. 7. You should understand that if you do not follow these instructions your surgery may be cancelled. If your physical condition changes (I.e. fever, cold or flu) please contact your surgeon as soon as possible. 8. It is important that you be on time. If a situation occurs where you may be late, please call [phone] (OR Holding Area). 9. If you have any questions and or problems, please call [phone] (Pre-admission Testing). 10. Your surgery time may be subject to change. You will receive a phone call the evening prior if your time changes. 11.  If having outpatient surgery, you must have someone to drive you here, stay with you during the duration of your stay, and to drive you home at time of discharge. Special Instructions: Use your inhalers the morning of surgery wether you need or not and bring with you to the hospital. Bring CPAP  With you the day of surgery to have available. MEDICATIONS TO TAKE THE MORNING OF SURGERY WITH A SIP OF WATER:Synthroid, Ativan, May take Levemir Insulin, Victoza  Insulin, call Dr Tin Estrada and follow his instructions pertaining to Insulins the day of surgery. I understand a pre-operative phone call will be made to verify my surgery time. In the event that I am not available, I give permission for a message to be left on my answering service and/or with another person?   Yes [phone]         ___________________      __________   _________    (Signature of Patient)             (Witness)                (Date and Time)

## 2017-02-13 NOTE — PERIOP NOTES
Faxed pre-op CBC, CHEM, UA to Dr Emi Aly for evaluation , ? [phone] [address]. Final UA CX is not back yet from lab. Confirmation fax received.  Rylee Shipley RN

## 2017-02-13 NOTE — TELEPHONE ENCOUNTER
Patient states she needs a call back in reference to getting a ER 2/11/17 Follow Up appt. Please call.  Thank you

## 2017-02-13 NOTE — PERIOP NOTES
When pt arrived for PAT visit, she proceeded to tell me that over the weekend while in [place] she developed chest pain radiating down her arm, diaphoresis and near syncope. She was taken to a hospital in [place] and was told her EKG was \"Normal\" , however she needed to follow up with her PCP and have a stress test done. She had not shared this information with her PCP, Cardiologist or Surgeon. The pt denied chest pain, SOB, and was not diaphoretic today and vital signs were stable 133/64- 71-24- 96% Sao2, Temp 98.2. We completed the PAT visit and did an EKG which was shown to Dr Khoi Gonzalez in Anesthesia, her EKG was unchanged from previous EKG, however he requested the patient have a cardiac clearance exam prior to surgery given the recent events. Patient was told she needed to call her Cardiologist and schedule a Clearance exam. She demonstrated clear understanding. Ryan MONTEJO

## 2017-02-14 ENCOUNTER — TELEPHONE (OUTPATIENT)
Dept: GYNECOLOGY | Age: 57
End: 2017-02-14

## 2017-02-14 LAB
ATRIAL RATE: 70 BPM
CALCULATED P AXIS, ECG09: 47 DEGREES
CALCULATED R AXIS, ECG10: -5 DEGREES
CALCULATED T AXIS, ECG11: 30 DEGREES
DIAGNOSIS, 93000: NORMAL
P-R INTERVAL, ECG05: 158 MS
Q-T INTERVAL, ECG07: 390 MS
QRS DURATION, ECG06: 78 MS
QTC CALCULATION (BEZET), ECG08: 421 MS
VENTRICULAR RATE, ECG03: 70 BPM

## 2017-02-14 NOTE — PERIOP NOTES
[facility] [place] called from Dr. Thurman Hard office and I explained patient needed cardiac clearance prior to surgery tomorrow due to experience over the weekend. Also asked if labs were received and have been reviewed. She states Dr. Doreen Thao will review labs when he comes to the office today.

## 2017-02-14 NOTE — PERIOP NOTES
LILIBETH with  at Dr. Madhu Nowak office requesting call back regarding labs and cardiac clearance. Call back information provided.

## 2017-02-15 ENCOUNTER — OFFICE VISIT (OUTPATIENT)
Dept: CARDIOLOGY CLINIC | Age: 57
End: 2017-02-15

## 2017-02-15 VITALS
BODY MASS INDEX: 43.4 KG/M2 | SYSTOLIC BLOOD PRESSURE: 148 MMHG | OXYGEN SATURATION: 94 % | DIASTOLIC BLOOD PRESSURE: 68 MMHG | HEIGHT: 69 IN | HEART RATE: 88 BPM | WEIGHT: 293 LBS

## 2017-02-15 DIAGNOSIS — I20.9 ANGINA PECTORIS ASSOCIATED WITH TYPE 2 DIABETES MELLITUS (HCC): ICD-10-CM

## 2017-02-15 DIAGNOSIS — E78.5 DYSLIPIDEMIA (HIGH LDL; LOW HDL): ICD-10-CM

## 2017-02-15 DIAGNOSIS — E11.59 ANGINA PECTORIS ASSOCIATED WITH TYPE 2 DIABETES MELLITUS (HCC): ICD-10-CM

## 2017-02-15 DIAGNOSIS — G47.30 SLEEP APNEA, UNSPECIFIED TYPE: ICD-10-CM

## 2017-02-15 DIAGNOSIS — E66.01 MORBID OBESITY DUE TO EXCESS CALORIES (HCC): ICD-10-CM

## 2017-02-15 DIAGNOSIS — Z86.711 HX OF PULMONARY EMBOLUS DURING PREGNANCY: ICD-10-CM

## 2017-02-15 DIAGNOSIS — Z01.810 PREOP CARDIOVASCULAR EXAM: Primary | ICD-10-CM

## 2017-02-15 DIAGNOSIS — I10 BENIGN ESSENTIAL HYPERTENSION: ICD-10-CM

## 2017-02-15 DIAGNOSIS — E11.65 TYPE 2 DIABETES MELLITUS WITH HYPERGLYCEMIA, WITHOUT LONG-TERM CURRENT USE OF INSULIN (HCC): ICD-10-CM

## 2017-02-15 DIAGNOSIS — Z87.59 HX OF PULMONARY EMBOLUS DURING PREGNANCY: ICD-10-CM

## 2017-02-15 LAB
BACTERIA SPEC CULT: NORMAL
CC UR VC: NORMAL
SERVICE CMNT-IMP: NORMAL

## 2017-02-15 RX ORDER — METHOCARBAMOL 500 MG/1
500 TABLET, FILM COATED ORAL
COMMUNITY
End: 2017-08-24

## 2017-02-15 NOTE — MR AVS SNAPSHOT
Visit Information Date & Time Provider Department Dept. Phone Encounter #  
 2/15/2017  9:30 AM MD Elis A[facility] [id number] Your Appointments 2/17/2017 10:45 AM  
HOSPITAL FOLLOW-UP with Uzma Pavon MD  
[facility]) Appt Note: ED visit for knee and back injury [facility] [address] [facility]  
[phone]  
  
   
 [facility] [address]  [address] [address]  
  
    
 2/28/2017  2:00 PM  
CHEMOTHERAPY with Sarah Robles MD  
[address] [place] [address] [facility]) Appt Note: 3rd floor labs today [address] [facility] [address] [address]  
[address] [phone]  
  
    
 3/3/2017 11:30 AM  
Follow Up with Tracey Lugo MD  
[place] [facility]) Appt Note: f/u        dm          [address] [phone] [address] [place] [address]  
  
    
 3/28/2017  1:45 PM  
CHEMOTHERAPY with Sarah Robles MD  
[address] [place] [address] [facility]) Appt Note: 3rd floor labs today [address] [address] [address] [phone]  
[phone] 6/13/2017  8:15 AM  
ROUTINE CARE with Gloria Saenz MD  
[facility]) Appt Note: 6 month follow up  
 [facility] [address] [facility]  
[phone]  
  
   
 [facility] [address]  [address] [facility]  
  
    
 2/15/2018  9:00 AM  
ESTABLISHED PATIENT with MD Elis Akins [facility]) Appt Note: 1 YR F/U  
 [address]  
[phone] [address] Upcoming Health Maintenance Date Due Hepatitis C Screening 1960 Pneumococcal 19-64 Highest Risk (1 of 3 - PCV13) 7/28/1980 DTaP/Tdap/Td series (1 - Tdap) 7/28/1982 FOOT EXAM Q1 6/25/2016 COLONOSCOPY 10/14/2016 MEDICARE YEARLY EXAM 10/15/2016 HEMOGLOBIN A1C Q6M 2/28/2017 PAP AKA CERVICAL CYTOLOGY 8/29/2017 MICROALBUMIN Q1 8/30/2017 LIPID PANEL Q1 8/30/2017 EYE EXAM RETINAL OR DILATED Q1 11/15/2017 BREAST CANCER SCRN MAMMOGRAM 9/22/2018 Allergies as of 2/15/2017  Review Complete On: 2/15/2017 By: Carri Her Severity Noted Reaction Type Reactions Carboplatin  08/29/2014    Other (comments) Patient developed shortness of breath, felt faint, coughing, skin flushed and \"itchy\". This occurred on the patients 8th treatment. Iodinated Contrast Media - Oral And Iv Dye  03/25/2012   Topical Rash Lipitor [Atorvastatin]  04/04/2014    Myalgia Percocet [Oxycodone-acetaminophen]  03/25/2012   Not Verified Other (comments) fever Shellfish Containing Products  10/04/2012    Hives Current Immunizations  Reviewed on 12/13/2016 Name Date Influenza Vaccine (Quad) PF 12/13/2016 Not reviewed this visit You Were Diagnosed With   
  
 Codes Comments Preop cardiovascular exam    -  Primary ICD-10-CM: Z01.810 ICD-9-CM: V72.81 Angina pectoris associated with type 2 diabetes mellitus ([facility]     ICD-10-CM: E11.59, I20.9 ICD-9-CM: 250.80, 413.9 Benign essential hypertension     ICD-10-CM: I10 
ICD-9-CM: 401.1 Hx of pulmonary embolus during pregnancy     ICD-10-CM: Z86.711, Z87.59 
ICD-9-CM: V12.51 Sleep apnea, unspecified type     ICD-10-CM: G47.30 ICD-9-CM: 780.57 Morbid obesity due to excess calories (HCC)     ICD-10-CM: E66.01 
ICD-9-CM: 278.01  Type 2 diabetes mellitus with hyperglycemia, without long-term current use of insulin ([facility]     ICD-10-CM: E11.65 ICD-9-CM: 250.00, 790.29 Dyslipidemia (high LDL; low HDL)     ICD-10-CM: E78.4 ICD-9-CM: 272.4 Vitals BP Pulse Height(growth percentile) Weight(growth percentile) LMP SpO2  
 148/68 88 5' 9\" (1.753 m) 340 lb (154.2 kg) 09/07/2011 94% BMI OB Status Smoking Status 50.21 kg/m2 Hysterectomy Never Smoker Vitals History BMI and BSA Data Body Mass Index Body Surface Area  
 50.21 kg/m 2 2.74 m 2 Preferred Pharmacy Pharmacy Name Phone [id number] [place], [address] [phone] Your Updated Medication List  
  
   
This list is accurate as of: 2/15/17 11:00 AM.  Always use your most recent med list.  
  
  
  
  
 * albuterol 90 mcg/actuation inhaler Commonly known as:  PROVENTIL HFA, VENTOLIN HFA, PROAIR HFA Take 2 Puffs by inhalation every four (4) hours as needed for Wheezing. * albuterol 2.5 mg /3 mL (0.083 %) nebulizer solution Commonly known as:  PROVENTIL VENTOLIN  
3 mL by Nebulization route every four (4) hours as needed for Wheezing. ALIGN 4 mg Cap Generic drug:  Bifidobacterium Infantis Take 1 Cap by mouth daily. ALLEGRA 180 mg tablet Generic drug:  fexofenadine Take 180 mg by mouth daily. BD INSULIN PEN NEEDLE UF SHORT 31 gauge x 5/16\" Ndle Generic drug:  Insulin Needles (Disposable) USE WITH INSULIN TO INJECT FIVE TIMES DAILY. Blood-Glucose Meter monitoring kit Use as directed to test blood sugar three times daily-DX-DM-250.00  
  
 cholecalciferol (VITAMIN D3) 5,000 unit Tab tablet Commonly known as:  VITAMIN D3 Take  by mouth daily. ciprofloxacin HCl 500 mg tablet Commonly known as:  CIPRO Take 1 Tab by mouth two (2) times a day. fluticasone-salmeterol 230-21 mcg/actuation inhaler Commonly known as:  ADVAIR HFA Take 2 Puffs by inhalation two (2) times a day. furosemide 40 mg tablet Commonly known as:  LASIX Take 80 mg by mouth daily. Lancets Misc Use as directed to test blood sugar three times daily-DX-DM-250.00 LEVEMIR FLEXTOUCH 100 unit/mL (3 mL) In Generic drug:  insulin detemir 30 UNITS IN THE MORNING AND 50 UNITS AT NIGHT. INCREASE AS DIRECTED TO MAX  UNITS PER DAY  
  
 levothyroxine 150 mcg tablet Commonly known as:  SYNTHROID  
TAKE 1 TAB BY MOUTH DAILY (BEFORE BREAKFAST). Liraglutide 0.6 mg/0.1 mL (18 mg/3 mL) sub-q pen Commonly known as:  VICTOZA  
0.3 mL by SubCUTAneous route daily (with lunch). LORazepam 1 mg tablet Commonly known as:  ATIVAN Take 1 Tab by mouth every six (6) hours as needed for Anxiety. Max Daily Amount: 4 mg. Indications: CANCER CHEMOTHERAPY-INDUCED NAUSEA AND VOMITING  
  
 methocarbamol 500 mg tablet Commonly known as:  ROBAXIN Take  by mouth three (3) times daily. montelukast 10 mg tablet Commonly known as:  SINGULAIR Take 1 Tab by mouth daily. NASONEX 50 mcg/actuation nasal spray Generic drug:  mometasone 2 sprays by Both Nostrils route as needed. NovoLOG Flexpen 100 unit/mL In Generic drug:  insulin aspart INJECT 20 UNITS BEFORE EACH MEAL + 4 UNITS FOR EVERY 50 MG/DL ABOVE 150 MG/DL.  UNITS PER DAY  
  
 omega-3 acid ethyl esters 1 gram capsule Commonly known as:  [place] TAKE ONE CAPSULE BY MOUTH TWICE A DAY  
  
 ondansetron 8 mg disintegrating tablet Commonly known as:  ZOFRAN ODT Take 1 Tab by mouth every eight (8) hours as needed for Nausea. oxyCODONE-acetaminophen 7.5-325 mg per tablet Commonly known as:  PERCOCET 7.5 Take 1 Tab by mouth every four (4) hours as needed for Pain. Max Daily Amount: 6 Tabs. potassium chloride SR 10 mEq tablet Commonly known as:  KLOR-CON 10 Take 1 Tab by mouth two (2) days a week. valsartan 160 mg tablet Commonly known as:  DIOVAN  
 TAKE 1 TAB BY MOUTH DAILY. REPLACES VALSARTAN-HCTZ * Notice: This list has 2 medication(s) that are the same as other medications prescribed for you. Read the directions carefully, and ask your doctor or other care provider to review them with you. We Performed the Following AMB POC EKG ROUTINE W/ 12 LEADS, INTER & REP [39560 CPT(R)] To-Do List   
 02/16/2017 ECHO:  2D ECHO COMPLETE ADULT (TTE) W OR WO CONTR   
  
 02/16/2017 Imaging:  NM CARDIAC SPECT W STRS/REST MULT   
  
 02/16/2017 ECG:  NUCLEAR STRESS TEST   
  
 03/02/2017 9:00 AM  
  Appointment with [address] [facility] ([phone])  
  
 03/09/2017 11:00 AM  
  Appointment with [address] [facility] [address] [facility] ([phone])  
  
 03/30/2017 9:00 AM  
  Appointment with [address] [facility] ([phone])  
  
 04/06/2017 10:00 AM  
  Appointment with [facility] [address] [facility] ([phone]) [facility]! Dear Ofe Owusu: Thank you for requesting a [employer] account. Our records indicate that you already have an active [employer] account. You can access your account anytime at [email or web address] Did you know that you can access your hospital and ER discharge instructions at any time in [employer]? You can also review all of your test results from your hospital stay or ER visit. Additional Information If you have questions, please visit the Frequently Asked Questions section of the [employer] website at [email or web address] Remember, [employer] is NOT to be used for urgent needs. For medical emergencies, dial 911. Now available from your iPhone and Android! Please provide this summary of care documentation to your next provider. Your primary care clinician is listed as Mesha Sarabia. If you have any questions after today's visit, please call [phone].
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
## 2017-02-21 ENCOUNTER — OFFICE VISIT (OUTPATIENT)
Dept: CARDIOLOGY CLINIC | Age: 57
End: 2017-02-21

## 2017-02-22 ENCOUNTER — TELEPHONE (OUTPATIENT)
Dept: GYNECOLOGY | Age: 57
End: 2017-02-22

## 2017-02-22 NOTE — LETTER
2017 11:59 AM 
 
Ms. Cathleen PARTIDA [facility] [phone] To whom it may concern, Gal Melo Hayden, AMA 1960 is being treated for ovarian cancer. She has the following upcoming appointments: 
 
17 Appointment with Dr. Chantale Matute 3/2/17  Chemotherapy Treatment 3/9/17  Chemotherapy Treatment 3/28/17 Appointment with Dr. Chantale Matute 3/30/17 Chemotherapy Treatment 17  Chemotherapy Treatment If you have any further questions, please call our office at [phone]. Sincerely, Martín Mendes MD

## 2017-02-22 NOTE — TELEPHONE ENCOUNTER
Pt , she states she needs a letter stating she will be resuming chemotherapy and the dates of her upcoming treatments. She requests it be faxed to [phone].

## 2017-02-23 DIAGNOSIS — C56.9 OVARIAN CANCER, UNSPECIFIED LATERALITY (HCC): Primary | ICD-10-CM

## 2017-02-28 ENCOUNTER — HOSPITAL ENCOUNTER (OUTPATIENT)
Dept: NON INVASIVE DIAGNOSTICS | Age: 57
Discharge: HOME | End: 2017-02-28
Attending: INTERNAL MEDICINE
Payer: MEDICARE

## 2017-02-28 ENCOUNTER — HOSPITAL ENCOUNTER (OUTPATIENT)
Dept: NUCLEAR MEDICINE | Age: 57
Discharge: HOME | End: 2017-02-28
Attending: INTERNAL MEDICINE
Payer: MEDICARE

## 2017-02-28 ENCOUNTER — OFFICE VISIT (OUTPATIENT)
Dept: GYNECOLOGY | Age: 57
End: 2017-02-28

## 2017-02-28 VITALS
BODY MASS INDEX: 43.4 KG/M2 | SYSTOLIC BLOOD PRESSURE: 139 MMHG | HEIGHT: 69 IN | HEART RATE: 91 BPM | DIASTOLIC BLOOD PRESSURE: 78 MMHG | WEIGHT: 293 LBS

## 2017-02-28 DIAGNOSIS — E11.65 TYPE 2 DIABETES MELLITUS WITH HYPERGLYCEMIA, WITHOUT LONG-TERM CURRENT USE OF INSULIN (HCC): ICD-10-CM

## 2017-02-28 DIAGNOSIS — I10 BENIGN ESSENTIAL HYPERTENSION: ICD-10-CM

## 2017-02-28 DIAGNOSIS — C80.0 CARCINOMATOSIS (HCC): Primary | ICD-10-CM

## 2017-02-28 DIAGNOSIS — E78.5 DYSLIPIDEMIA (HIGH LDL; LOW HDL): ICD-10-CM

## 2017-02-28 DIAGNOSIS — Z87.59 HX OF PULMONARY EMBOLUS DURING PREGNANCY: ICD-10-CM

## 2017-02-28 DIAGNOSIS — Z86.711 HX OF PULMONARY EMBOLUS DURING PREGNANCY: ICD-10-CM

## 2017-02-28 DIAGNOSIS — G47.30 SLEEP APNEA, UNSPECIFIED TYPE: ICD-10-CM

## 2017-02-28 DIAGNOSIS — E11.59 ANGINA PECTORIS ASSOCIATED WITH TYPE 2 DIABETES MELLITUS (HCC): ICD-10-CM

## 2017-02-28 DIAGNOSIS — E66.01 MORBID OBESITY DUE TO EXCESS CALORIES (HCC): ICD-10-CM

## 2017-02-28 DIAGNOSIS — Z01.810 PREOP CARDIOVASCULAR EXAM: ICD-10-CM

## 2017-02-28 DIAGNOSIS — C56.9 MALIGNANT NEOPLASM OF OVARY, UNSPECIFIED LATERALITY (HCC): ICD-10-CM

## 2017-02-28 DIAGNOSIS — R97.1 ELEVATED CA-125: ICD-10-CM

## 2017-02-28 DIAGNOSIS — I20.9 ANGINA PECTORIS ASSOCIATED WITH TYPE 2 DIABETES MELLITUS (HCC): ICD-10-CM

## 2017-02-28 PROCEDURE — 93306 TTE W/DOPPLER COMPLETE: CPT

## 2017-02-28 RX ORDER — SERTRALINE HYDROCHLORIDE 50 MG/1
TABLET, FILM COATED ORAL
Refills: 1 | Status: ON HOLD | COMMUNITY
Start: 2017-02-18 | End: 2021-01-01

## 2017-02-28 RX ORDER — HYDROCODONE BITARTRATE AND ACETAMINOPHEN 5; 325 MG/1; MG/1
1 TABLET ORAL
COMMUNITY
Start: 2017-02-11 | End: 2017-03-12

## 2017-02-28 RX ORDER — CLONAZEPAM 1 MG/1
TABLET ORAL
Refills: 1 | COMMUNITY
Start: 2017-02-18 | End: 2017-07-18

## 2017-02-28 RX ORDER — METHOCARBAMOL 500 MG/1
500 TABLET, FILM COATED ORAL
COMMUNITY
Start: 2017-02-11 | End: 2017-03-03

## 2017-02-28 NOTE — PROGRESS NOTES
[address] [place] [address] [address], [address]  [phone]  MD Shannon Anne MD      Patient ID:  Name: Melina Bales  MRN: [id number]  : 1960/53 y.o. Visit date: 2017     Acute Diagnosis:   Vaginal spotting   Persistent EOC    HISTORY:  49 y.o.  female with a diagnosis of ovarian cancer. Her original surgery: 12 Ex-lap, ROULA/BSO, radical tumor debulking. Pathology:  High grade serious ovarian cancer with omental cake and carcinomatosis. Stage IIIC. She received Taxol/carboplatin (10/2012 - 2013). She recurred in mid  noted CT with Peritoneal carcinomatosis with slightly increased size of the peritoneal implants throughout the abdomen, Pericardial lymph nodes unchanged and Bilateral pelvic adenopathy increased in size. Initiated retreatment with Taxol/carboplatin (2014-2014) with HSR. She was then monitored until progression, initiated on Doxil/Avastin (3/2014), did poorly, though MRI following cycle 3 showed extensive progression. She has receive Topotecan palliation. Component      Latest Ref Rng & Units 2017 2017 2016 10/17/2016           1:48 PM  1:37 PM  1:54 PM  2:48 PM   CA-125      0 - 30 U/mL   265 (H) 176 (H)   Cancer Ag (CA) 125      0.0 - 38.1 U/mL 347.1 (H) 353.3 (H)       S/P IV Gemzar/Avastin    EOD CT: 2017  FINDINGS:   Solid organ evaluation is limited without contrast.      Multiple scattered intraperitoneal soft tissue nodules are overall increased. A  representative soft tissue mass in the pelvis adjacent to the rectum measures  3.6 x 3.8 cm (series 3, image 77), previously 1.6 x 1.9 cm. A representative  soft tissue nodule in the left hemipelvis measures 3.2 x 3.3 cm (series 3, image  70), previously 2.5 x 2.9 cm. An adjacent soft tissue nodule laterally measures  3.4 x 4.7 cm (series 3, image 72), previously 2.7 x 4.2 cm.  A representative  conglomerate of soft tissue nodules adjacent to the pancreatic tail measures 3.6  x 4.8 cm (series 3, image 21), previously 3.2 x 3.4 cm.     A right inguinal lymph node measures 2.2 x 3.1 cm (series 3, image 83),  previously 1.8 x 2.6 cm.      The visualized lung bases demonstrate no mass or consolidation. The heart size  is normal. There is no pericardial or pleural effusion.     A 1.2 cm calculus is again seen in the left renal pelvis with new mild left  hydronephrosis and mild fat stranding adjacent to the left renal hilum. Several  small nonobstructing calculi are also seen bilaterally. The urinary bladder is  grossly normal.     The liver, spleen, pancreas, and adrenal glands are normal. The gall bladder is  present without intra- or extra-hepatic biliary dilatation.      There are no dilated bowel loops. There is no free fluid or free air. The aorta  tapers without aneurysm.     The uterus and ovaries are surgically absent. There is no pelvic mass. There are  stable degenerative changes in the spine. There is no aggressive blastic or  lytic bony lesion.     IMPRESSION  IMPRESSION:   1. Overall progression of metastatic disease with multiple intraperitoneal soft  tissue implants. There is no ascites. There is also increased right inguinal  lymphadenopathy.     2. At 1.2 cm calculus is again seen in the left renal collecting system. There  is new mild left hydronephrosis and inflammation adjacent to the left renal  hilum. Correlation with urinalysis for infection is suggested.     The patient reports episodic, painless vaginal spotting, stringy clots. Denies dysuria, hematuria. Urinary incontinence continues, vaginal discharge, N/V, F/C. Ongoing dental evaluation for ? ? infection.  Placed on antibiotics  Negative  and GI review for dysfunction  Negative cardiopulmonary review  No weight loss    PMH:  Past Medical History:   Diagnosis Date    Anemia     Arthritis     DDD low back and knees  Asthma     uses albuterol prn only; none x 6 mos. Never hospitalized    BRCA negative 2013    Calculus of kidney     Chronic kidney disease     kidney stones    Chronic pain     knee pain bilat    CINV (chemotherapy-induced nausea and vomiting) 2014    Diabetes ([facility] Age 46    IDDM    Environmental allergies     Fibromyalgia     GERD (gastroesophageal reflux disease)     controlled with med    Hypertension     Ill-defined condition     Sepsis     Morbid obesity ([facility]     Other and unspecified hyperlipidemia     Ovarian cancer ([facility] 2012, 2014    serous, high grade, stage IIIc. s/p chemotx (has port)    Rectal bleeding     Thromboembolus ([facility]     POST PARTUM; resolved- PELVIS    Thyroid disease     HYPOTHYROID    Unspecified sleep apnea     CPAP, Dr. Darryle Dung     PSH:  Past Surgical History:   Procedure Laterality Date    BREAST SURGERY PROCEDURE UNLISTED      Lymph node in left breast 2017    CHEST SURGERY PROCEDURE UNLISTED      Placement of port a cath right chest    HX GYN       X2    HX HYSTERECTOMY  2012    ROULA/BSO, tumor debulking, omentectomy for ovarian cancer    HX ORTHOPAEDIC      ankle-FX, R    HX ORTHOPAEDIC      FX R ARM    HX VASCULAR ACCESS  2015    right chest- port placed     Medications:  Current Outpatient Prescriptions on File Prior to Visit   Medication Sig Dispense Refill    methocarbamol (ROBAXIN) 500 mg tablet Take  by mouth three (3) times daily.  ondansetron (ZOFRAN ODT) 8 mg disintegrating tablet Take 1 Tab by mouth every eight (8) hours as needed for Nausea. 30 Tab 1    valsartan (DIOVAN) 160 mg tablet TAKE 1 TAB BY MOUTH DAILY. REPLACES VALSARTAN-HCTZ 30 Tab 11    LEVEMIR FLEXTOUCH 100 unit/mL (3 mL) inpn 30 UNITS IN THE MORNING AND 50 UNITS AT NIGHT.  INCREASE AS DIRECTED TO MAX  UNITS PER DAY 30 mL 11    omega-3 acid ethyl esters (LOVAZA) 1 gram capsule TAKE ONE CAPSULE BY MOUTH TWICE A  Cap 3    montelukast (SINGULAIR) 10 mg tablet Take 1 Tab by mouth daily. 90 Tab 2    levothyroxine (SYNTHROID) 150 mcg tablet TAKE 1 TAB BY MOUTH DAILY (BEFORE BREAKFAST). 90 Tab 3    Lancets misc Use as directed to test blood sugar three times daily-DX-DM-250.00 270 Each 3    fluticasone-salmeterol (ADVAIR HFA) 230-21 mcg/actuation inhaler Take 2 Puffs by inhalation two (2) times a day. 12 g 5    BD INSULIN PEN NEEDLE UF SHORT 31 gauge x 5/16\" ndle USE WITH INSULIN TO INJECT FIVE TIMES DAILY. 200 Pen Needle 11    albuterol (PROVENTIL VENTOLIN) 2.5 mg /3 mL (0.083 %) nebulizer solution 3 mL by Nebulization route every four (4) hours as needed for Wheezing. 24 Each 11    Liraglutide (VICTOZA) 0.6 mg/0.1 mL (18 mg/3 mL) sub-q pen 0.3 mL by SubCUTAneous route daily (with lunch). 3 Each 11    NOVOLOG FLEXPEN 100 unit/mL inpn INJECT 20 UNITS BEFORE EACH MEAL + 4 UNITS FOR EVERY 50 MG/DL ABOVE 150 MG/DL.  UNITS PER DAY 30 mL 11    LORazepam (ATIVAN) 1 mg tablet Take 1 Tab by mouth every six (6) hours as needed for Anxiety. Max Daily Amount: 4 mg. Indications: CANCER CHEMOTHERAPY-INDUCED NAUSEA AND VOMITING 40 Tab 1    potassium chloride SR (KLOR-CON 10) 10 mEq tablet Take 1 Tab by mouth two (2) days a week. 30 Tab 1    cholecalciferol, VITAMIN D3, (VITAMIN D3) 5,000 unit tab tablet Take  by mouth daily.  furosemide (LASIX) 40 mg tablet Take 80 mg by mouth daily. 3    Blood-Glucose Meter monitoring kit Use as directed to test blood sugar three times daily-DX-DM-250.00 1 Kit 0    albuterol (PROVENTIL HFA, VENTOLIN HFA, PROAIR HFA) 90 mcg/actuation inhaler Take 2 Puffs by inhalation every four (4) hours as needed for Wheezing.  Bifidobacterium Infantis (ALIGN) 4 mg cap Take 1 Cap by mouth daily.  fexofenadine (ALLEGRA) 180 mg tablet Take 180 mg by mouth daily.  mometasone (NASONEX) 50 mcg/actuation nasal spray 2 sprays by Both Nostrils route as needed.       oxyCODONE-acetaminophen (PERCOCET 7. 5) 7.5-325 mg per tablet Take 1 Tab by mouth every four (4) hours as needed for Pain. Max Daily Amount: 6 Tabs. 50 Tab 0     No current facility-administered medications on file prior to visit. Allergies   Allergen Reactions    Carboplatin Other (comments)     Patient developed shortness of breath, felt faint, coughing, skin flushed and \"itchy\". This occurred on the patients 8th treatment.  Iodinated Contrast Media - Oral And Iv Dye Rash    Lipitor [Atorvastatin] Myalgia    Percocet [Oxycodone-Acetaminophen] Other (comments)     fever    Shellfish Containing Products Hives     ROS:  Negative    OBJECTIVE:  Vitals: Kym Keith Vitals:    02/28/17 1403   BP: 139/78   Pulse: 91   Weight: 335 lb 12.8 oz (152.3 kg)   Height: 5' 9\" (1.753 m)        Office Examination:    General appearance - alert, well appearing, and in no distress  Eyes - clear  Lymphatics - no palpable lymphadenopathy  Heart - normal rate and regular rhythm, S1 and S2 normal  Pulmonary - Clear to ausculation and percussion  Abdomen - rotund, soft, nontender, nondistended, no masses or organomegaly/fluid wave  Pelvic - Vulva: No gross lesion, BUS negative   Vagina: No lesion, blood. No suspicious nodularity or induration. Incontinent urine clean. Bimanual: No suspicious mass, tenderness---limited examination  Rectal - rectal exam not indicated  Back exam - no CVAT  Extrem: Mild upper extremity edema, L>R, good pulses.  Nontender      DATE REVIEW as available:  Lab Results   Component Value Date/Time    WBC 7.6 02/13/2017 04:04 PM    HGB 8.4 02/13/2017 04:04 PM    HCT 27.5 02/13/2017 04:04 PM    PLATELET 619 98/14/3442 04:04 PM    MCV 90.5 02/13/2017 04:04 PM     Lab Results   Component Value Date/Time    Sodium 138 02/27/2017 11:39 AM    Potassium 4.9 02/27/2017 11:39 AM    Chloride 102 02/27/2017 11:39 AM    CO2 17 02/27/2017 11:39 AM    Anion gap 7 02/13/2017 04:04 PM    Glucose 137 02/27/2017 11:39 AM    BUN 27 02/27/2017 11:39 AM Creatinine 1.48 02/27/2017 11:39 AM    BUN/Creatinine ratio 18 02/27/2017 11:39 AM    GFR est AA 46 02/27/2017 11:39 AM    GFR est non-AA 40 02/27/2017 11:39 AM    Calcium 9.5 02/27/2017 11:39 AM    Bilirubin, total 0.4 02/27/2017 11:39 AM    AST (SGOT) 27 02/27/2017 11:39 AM    Alk. phosphatase 84 02/27/2017 11:39 AM    Protein, total 8.0 02/27/2017 11:39 AM    Albumin 4.0 02/27/2017 11:39 AM    Globulin 5.0 02/13/2017 04:04 PM    A-G Ratio 1.0 02/27/2017 11:39 AM    ALT (SGPT) 19 02/27/2017 11:39 AM         IMPRESSION AND PLAN:    Amrita Mcelroy has a working diagnosis of EOC. EOD CT and  elevations would be consistent with recurrence. She is S/P six cycles of Carbo/Taxol retreatment due to prolonged DFI. Elevated . CT suggestive of progressive disease. S/P Doxil/Avastin #3. S/P Topotecan #3, complicated by anemia and thrombocytopenia  No further chemotherapy since 4/2016    Recommend resumption of chemotherapy  Screening labs  Anticipate IV Gemzar/Avastin #2  All questions answered.       UTI: Rx--      Trey Felipe MD  2/28/2017

## 2017-03-01 ENCOUNTER — HOSPITAL ENCOUNTER (OUTPATIENT)
Dept: NON INVASIVE DIAGNOSTICS | Age: 57
Discharge: HOME | End: 2017-03-01
Attending: INTERNAL MEDICINE
Payer: MEDICARE

## 2017-03-01 ENCOUNTER — HOSPITAL ENCOUNTER (OUTPATIENT)
Dept: NUCLEAR MEDICINE | Age: 57
Discharge: HOME | End: 2017-03-01
Attending: INTERNAL MEDICINE
Payer: MEDICARE

## 2017-03-01 DIAGNOSIS — G47.30 SLEEP APNEA: ICD-10-CM

## 2017-03-01 DIAGNOSIS — E78.5 DYSLIPIDEMIA: ICD-10-CM

## 2017-03-01 LAB
ALBUMIN SERPL-MCNC: 3.9 G/DL (ref 3.5–5.5)
ALBUMIN/GLOB SERPL: 0.9 {RATIO} (ref 1.1–2.5)
ALP SERPL-CCNC: 90 IU/L (ref 39–117)
ALT SERPL-CCNC: 21 IU/L (ref 0–32)
AST SERPL-CCNC: 33 IU/L (ref 0–40)
ATTENDING PHYSICIAN, CST07: NORMAL
BASOPHILS # BLD AUTO: 0 X10E3/UL (ref 0–0.2)
BASOPHILS NFR BLD AUTO: 0 %
BILIRUB SERPL-MCNC: 0.2 MG/DL (ref 0–1.2)
BUN SERPL-MCNC: 32 MG/DL (ref 6–24)
BUN/CREAT SERPL: 23 (ref 9–23)
CALCIUM SERPL-MCNC: 9.2 MG/DL (ref 8.7–10.2)
CANCER AG125 SERPL-ACNC: 259 U/ML (ref 0–38.1)
CHLORIDE SERPL-SCNC: 104 MMOL/L (ref 96–106)
CO2 SERPL-SCNC: 19 MMOL/L (ref 18–29)
CREAT SERPL-MCNC: 1.38 MG/DL (ref 0.57–1)
DIAGNOSIS, 93000: NORMAL
DUKE TM SCORE RESULT, CST14: NORMAL
DUKE TREADMILL SCORE, CST13: 5456
ECG INTERP BEFORE EX, CST11: NORMAL
ECG INTERP DURING EX, CST12: NORMAL
EOSINOPHIL # BLD AUTO: 0.1 X10E3/UL (ref 0–0.4)
EOSINOPHIL NFR BLD AUTO: 2 %
ERYTHROCYTE [DISTWIDTH] IN BLOOD BY AUTOMATED COUNT: 14.9 % (ref 12.3–15.4)
FUNCTIONAL CAPACITY, CST17: NORMAL
GLOBULIN SER CALC-MCNC: 4.4 G/DL (ref 1.5–4.5)
GLUCOSE SERPL-MCNC: 168 MG/DL (ref 65–99)
HCT VFR BLD AUTO: 29.2 % (ref 34–46.6)
HGB BLD-MCNC: 9.2 G/DL (ref 11.1–15.9)
IMM GRANULOCYTES # BLD: 0 X10E3/UL (ref 0–0.1)
IMM GRANULOCYTES NFR BLD: 0 %
KNOWN CARDIAC CONDITION, CST08: NORMAL
LYMPHOCYTES # BLD AUTO: 2.2 X10E3/UL (ref 0.7–3.1)
LYMPHOCYTES NFR BLD AUTO: 29 %
MAX. DIASTOLIC BP, CST04: 67 MMHG
MAX. HEART RATE, CST05: 93 BPM
MAX. SYSTOLIC BP, CST03: 129 MMHG
MCH RBC QN AUTO: 28.1 PG (ref 26.6–33)
MCHC RBC AUTO-ENTMCNC: 31.5 G/DL (ref 31.5–35.7)
MCV RBC AUTO: 89 FL (ref 79–97)
MONOCYTES # BLD AUTO: 0.7 X10E3/UL (ref 0.1–0.9)
MONOCYTES NFR BLD AUTO: 9 %
NEUTROPHILS # BLD AUTO: 4.5 X10E3/UL (ref 1.4–7)
NEUTROPHILS NFR BLD AUTO: 60 %
OVERALL BP RESPONSE TO EXERCISE, CST16: NORMAL
OVERALL HR RESPONSE TO EXERCISE, CST15: NORMAL
PEAK EX METS, CST10: 1 METS
PLATELET # BLD AUTO: 233 X10E3/UL (ref 150–379)
POTASSIUM SERPL-SCNC: 4.6 MMOL/L (ref 3.5–5.2)
PROT SERPL-MCNC: 8.3 G/DL (ref 6–8.5)
PROTOCOL NAME, CST01: NORMAL
RBC # BLD AUTO: 3.27 X10E6/UL (ref 3.77–5.28)
SODIUM SERPL-SCNC: 142 MMOL/L (ref 134–144)
TEST INDICATION, CST09: NORMAL
WBC # BLD AUTO: 7.6 X10E3/UL (ref 3.4–10.8)

## 2017-03-01 PROCEDURE — 78452 HT MUSCLE IMAGE SPECT MULT: CPT

## 2017-03-01 PROCEDURE — 74011250636 HC RX REV CODE- 250/636: Performed by: INTERNAL MEDICINE

## 2017-03-01 PROCEDURE — 93017 CV STRESS TEST TRACING ONLY: CPT

## 2017-03-01 RX ORDER — SODIUM CHLORIDE 0.9 % (FLUSH) 0.9 %
10 SYRINGE (ML) INJECTION AS NEEDED
Status: DISCONTINUED | OUTPATIENT
Start: 2017-03-01 | End: 2017-03-05 | Stop reason: HOSPADM

## 2017-03-01 RX ORDER — GRANISETRON HYDROCHLORIDE 1 MG/ML
1 INJECTION, SOLUTION INTRAVENOUS ONCE
Status: COMPLETED | OUTPATIENT
Start: 2017-03-02 | End: 2017-03-02

## 2017-03-01 RX ORDER — SODIUM CHLORIDE 0.9 % (FLUSH) 0.9 %
SYRINGE (ML) INJECTION
Status: DISCONTINUED
Start: 2017-03-01 | End: 2017-03-04 | Stop reason: HOSPADM

## 2017-03-01 RX ADMIN — Medication 10 ML: at 09:47

## 2017-03-01 RX ADMIN — REGADENOSON 0.4 MG: 0.08 INJECTION, SOLUTION INTRAVENOUS at 09:47

## 2017-03-02 ENCOUNTER — HOSPITAL ENCOUNTER (OUTPATIENT)
Dept: INFUSION THERAPY | Age: 57
Discharge: HOME | End: 2017-03-02
Payer: MEDICARE

## 2017-03-02 VITALS
BODY MASS INDEX: 43.4 KG/M2 | WEIGHT: 293 LBS | HEART RATE: 68 BPM | TEMPERATURE: 97 F | RESPIRATION RATE: 18 BRPM | SYSTOLIC BLOOD PRESSURE: 116 MMHG | DIASTOLIC BLOOD PRESSURE: 78 MMHG | HEIGHT: 69 IN

## 2017-03-02 DIAGNOSIS — C80.0 CARCINOMATOSIS (HCC): ICD-10-CM

## 2017-03-02 DIAGNOSIS — N30.00 ACUTE CYSTITIS WITHOUT HEMATURIA: Primary | ICD-10-CM

## 2017-03-02 DIAGNOSIS — C56.9 OVARIAN CANCER, UNSPECIFIED LATERALITY (HCC): ICD-10-CM

## 2017-03-02 PROBLEM — N17.9 ARF (ACUTE RENAL FAILURE) (HCC): Status: ACTIVE | Noted: 2017-03-02

## 2017-03-02 PROBLEM — N20.0 RENAL CALCULI: Status: ACTIVE | Noted: 2017-03-02

## 2017-03-02 PROBLEM — R79.9 ELEVATED BUN: Status: ACTIVE | Noted: 2017-03-02

## 2017-03-02 LAB
APPEARANCE UR: ABNORMAL
BACTERIA URNS QL MICRO: ABNORMAL /HPF
BILIRUB UR QL: NEGATIVE
COLOR UR: ABNORMAL
EPITH CASTS URNS QL MICRO: ABNORMAL /LPF
GLUCOSE UR STRIP.AUTO-MCNC: NEGATIVE MG/DL
HGB UR QL STRIP: ABNORMAL
KETONES UR QL STRIP.AUTO: NEGATIVE MG/DL
LEUKOCYTE ESTERASE UR QL STRIP.AUTO: ABNORMAL
NITRITE UR QL STRIP.AUTO: POSITIVE
PH UR STRIP: 5.5 [PH] (ref 5–8)
PROT UR STRIP-MCNC: 100 MG/DL
RBC #/AREA URNS HPF: >100 /HPF (ref 0–5)
SP GR UR REFRACTOMETRY: 1.02 (ref 1–1.03)
UROBILINOGEN UR QL STRIP.AUTO: 0.2 EU/DL (ref 0.2–1)
WBC URNS QL MICRO: >100 /HPF (ref 0–4)

## 2017-03-02 PROCEDURE — 74011000250 HC RX REV CODE- 250: Performed by: OBSTETRICS & GYNECOLOGY

## 2017-03-02 PROCEDURE — 87186 SC STD MICRODIL/AGAR DIL: CPT | Performed by: NURSE PRACTITIONER

## 2017-03-02 PROCEDURE — 77030012965 HC NDL HUBR BBMI -A

## 2017-03-02 PROCEDURE — 74011250636 HC RX REV CODE- 250/636: Performed by: OBSTETRICS & GYNECOLOGY

## 2017-03-02 PROCEDURE — 96413 CHEMO IV INFUSION 1 HR: CPT

## 2017-03-02 PROCEDURE — 81001 URINALYSIS AUTO W/SCOPE: CPT | Performed by: NURSE PRACTITIONER

## 2017-03-02 PROCEDURE — 87086 URINE CULTURE/COLONY COUNT: CPT | Performed by: NURSE PRACTITIONER

## 2017-03-02 PROCEDURE — 96375 TX/PRO/DX INJ NEW DRUG ADDON: CPT

## 2017-03-02 PROCEDURE — 96361 HYDRATE IV INFUSION ADD-ON: CPT

## 2017-03-02 PROCEDURE — 87077 CULTURE AEROBIC IDENTIFY: CPT | Performed by: NURSE PRACTITIONER

## 2017-03-02 PROCEDURE — 74011250636 HC RX REV CODE- 250/636: Performed by: NURSE PRACTITIONER

## 2017-03-02 RX ORDER — HEPARIN 100 UNIT/ML
500 SYRINGE INTRAVENOUS AS NEEDED
Status: ACTIVE | OUTPATIENT
Start: 2017-03-02 | End: 2017-03-03

## 2017-03-02 RX ORDER — SODIUM CHLORIDE 9 MG/ML
10 INJECTION INTRAMUSCULAR; INTRAVENOUS; SUBCUTANEOUS AS NEEDED
Status: ACTIVE | OUTPATIENT
Start: 2017-03-02 | End: 2017-03-03

## 2017-03-02 RX ORDER — SODIUM CHLORIDE 0.9 % (FLUSH) 0.9 %
10 SYRINGE (ML) INJECTION AS NEEDED
Status: ACTIVE | OUTPATIENT
Start: 2017-03-02 | End: 2017-03-03

## 2017-03-02 RX ORDER — LEVOFLOXACIN 250 MG/1
250 TABLET ORAL DAILY
Qty: 10 TAB | Refills: 0 | Status: SHIPPED | OUTPATIENT
Start: 2017-03-02 | End: 2017-03-16

## 2017-03-02 RX ADMIN — SODIUM CHLORIDE 1000 ML: 900 INJECTION, SOLUTION INTRAVENOUS at 11:35

## 2017-03-02 RX ADMIN — SODIUM CHLORIDE 10 ML: 9 INJECTION, SOLUTION INTRAMUSCULAR; INTRAVENOUS; SUBCUTANEOUS at 09:21

## 2017-03-02 RX ADMIN — SODIUM CHLORIDE 2192 MG: 900 INJECTION, SOLUTION INTRAVENOUS at 10:56

## 2017-03-02 RX ADMIN — GRANISETRON HYDROCHLORIDE 1 MG: 1 INJECTION, SOLUTION INTRAVENOUS at 09:28

## 2017-03-02 NOTE — PROGRESS NOTES
Lotus Cruz. JAD Hernandez      Date of visit: 3/2/2017       Subjective:   Ms. Paddy Combs is a long time pt of Dr. Mercedes Stein. She was initially dx with ovarian cancer in Sept 2012. She had MARAH/BSO/Cytoreductive surgery on 9/21/12. At that time she was Stage III C. She recovered and began 6 cycles of Carbo/Taxol. Had good response, but reoccurred and in Fall of 2014 she received 3 cycles of Carbo/Taxol before having a reaction to Carbo and this was stopped. She continued her next 3 cycles with Doxil/Avastin. This did not decrease disease. .   She had a treatment break from 3/2015 until she restarted with Topatecan for 11 cycles from July 2015 through March 2016. She was followed by Dr. Mercedes Stein and in Jan 2017, she had a CT scan that showed recurrence of disease (see below)      She began treatment with gemzar q21 days on Feb 2nd and presents today for cycle 2  She said she did well after her first cycle. Acknowledges some fatigue, but denied any nausea, vomiting, constipation or diarrhea. She continues drinking \"green smoothies\" twice a day between meals that she says keeps her \"regular\". Of note, she has been found on CT to have renal calculi and has seen Dr. Jodie Donato (urology) and they are preparing to have the stones removed once she has cardiac clearance. Pt had stress test yesterday. Her son is again with her today and supportive. Diagnosis: Ovarian cancer  Original Surgery: 9/21/12 Ex-lap, ROULA/BSO, radical tumor debulking  Pathology: High grade serious ovarian cancer with omental cake and carcinomatosis. Stage IIIC  Preop CA-125: 147 units      Current Outpatient Prescriptions   Medication Sig    HYDROcodone-acetaminophen (NORCO) 5-325 mg per tablet Take 1 Tab by mouth.  methocarbamol (ROBAXIN) 500 mg tablet Take 500 mg by mouth.     sertraline (ZOLOFT) 50 mg tablet TAKE 1 TABLET BY MOUTH EVERY MORNING    clonazePAM (KLONOPIN) 1 mg tablet TAKE 1 TABLET BY MOUTH TWICE A DAY    methocarbamol (ROBAXIN) 500 mg tablet Take  by mouth three (3) times daily.  oxyCODONE-acetaminophen (PERCOCET 7.5) 7.5-325 mg per tablet Take 1 Tab by mouth every four (4) hours as needed for Pain. Max Daily Amount: 6 Tabs.  ondansetron (ZOFRAN ODT) 8 mg disintegrating tablet Take 1 Tab by mouth every eight (8) hours as needed for Nausea.  valsartan (DIOVAN) 160 mg tablet TAKE 1 TAB BY MOUTH DAILY. REPLACES VALSARTAN-HCTZ    LEVEMIR FLEXTOUCH 100 unit/mL (3 mL) inpn 30 UNITS IN THE MORNING AND 50 UNITS AT NIGHT. INCREASE AS DIRECTED TO MAX  UNITS PER DAY    omega-3 acid ethyl esters (LOVAZA) 1 gram capsule TAKE ONE CAPSULE BY MOUTH TWICE A DAY    montelukast (SINGULAIR) 10 mg tablet Take 1 Tab by mouth daily.  levothyroxine (SYNTHROID) 150 mcg tablet TAKE 1 TAB BY MOUTH DAILY (BEFORE BREAKFAST).  Lancets misc Use as directed to test blood sugar three times daily-DX-DM-250.00    fluticasone-salmeterol (ADVAIR HFA) 230-21 mcg/actuation inhaler Take 2 Puffs by inhalation two (2) times a day.  BD INSULIN PEN NEEDLE UF SHORT 31 gauge x 5/16\" ndle USE WITH INSULIN TO INJECT FIVE TIMES DAILY.  albuterol (PROVENTIL VENTOLIN) 2.5 mg /3 mL (0.083 %) nebulizer solution 3 mL by Nebulization route every four (4) hours as needed for Wheezing.  Liraglutide (VICTOZA) 0.6 mg/0.1 mL (18 mg/3 mL) sub-q pen 0.3 mL by SubCUTAneous route daily (with lunch).  NOVOLOG FLEXPEN 100 unit/mL inpn INJECT 20 UNITS BEFORE EACH MEAL + 4 UNITS FOR EVERY 50 MG/DL ABOVE 150 MG/DL.  UNITS PER DAY    LORazepam (ATIVAN) 1 mg tablet Take 1 Tab by mouth every six (6) hours as needed for Anxiety. Max Daily Amount: 4 mg. Indications: CANCER CHEMOTHERAPY-INDUCED NAUSEA AND VOMITING    potassium chloride SR (KLOR-CON 10) 10 mEq tablet Take 1 Tab by mouth two (2) days a week.     cholecalciferol, VITAMIN D3, (VITAMIN D3) 5,000 unit tab tablet Take  by mouth daily.  furosemide (LASIX) 40 mg tablet Take 80 mg by mouth daily.  Blood-Glucose Meter monitoring kit Use as directed to test blood sugar three times daily-DX-DM-250.00    albuterol (PROVENTIL HFA, VENTOLIN HFA, PROAIR HFA) 90 mcg/actuation inhaler Take 2 Puffs by inhalation every four (4) hours as needed for Wheezing.  Bifidobacterium Infantis (ALIGN) 4 mg cap Take 1 Cap by mouth daily.  fexofenadine (ALLEGRA) 180 mg tablet Take 180 mg by mouth daily.  mometasone (NASONEX) 50 mcg/actuation nasal spray 2 sprays by Both Nostrils route as needed. Current Facility-Administered Medications   Medication Dose Route Frequency    sodium chloride (NS) flush 10 mL  10 mL IntraVENous PRN    heparin (porcine) pf 500 Units  500 Units IntraVENous PRN    sodium chloride 0.9 % injection 10 mL  10 mL IntraVENous PRN    sodium chloride 0.9 % bolus infusion 1,000 mL  1,000 mL IntraVENous ONCE     Facility-Administered Medications Ordered in Other Encounters   Medication Dose Route Frequency    sodium chloride (NS) 0.9 % flush        regadenoson (LEXISCAN) 0.4 mg/5 mL injection        saline peripheral flush soln 10 mL  10 mL InterCATHeter PRN        Review of Systems:  General: Denies wt loss. Some fatigue \"for a few days only\"  HEENT: Denies visual changes, dysphagia or headache  Resp: Denies SOB, or change in ECKERT, wheezing or cough  CV: Denies CP, palpitations  GI/: Denies N/V, diarrhea, constipation or dysuria  MuskSkel:Continues with DJD knee pain. She said she was told she needed a TKR, but has not gotten it yet. Neuro: Denies dizzyness or syncope    Objective:     Patient Vitals for the past 8 hrs:   BP Temp Pulse Resp Height Weight   03/02/17 0911 141/78 97 °F (36.1 °C) 81 18 5' 8.5\" (1.74 m) 332 lb (150.6 kg)       Physical Exam:  General: A&O X3 in NAD  HEENT: Sclera anicteric, Mucosa pink, moist   Neck: No JVD. No cervical adenopathy appreciated.    Heart: Regular with persistent 2-3/6 murmur  Lungs: CTA Bilat without wheezing or rales   Abd: Soft, obese, NT/ND with + BS throughout  Ext: Without edema and + pedal pulses bilat  Neuro: grossly intact      LABS: 2/28/2017:  WBC-7.6; HGB 9.2; HCT 28.9; Plt: 233; ANC 4.5; Na 142; K+ 4.6; Cl 104; CO2 19; BUN32; Creat 1.38 (was 1.17 on 1/30/17); Gluc 168; HgA1C on 2/27 6.4  CA-125 259 (CA-125 on 1/30 347)      CT ABD/Pelvis without contrast 1/13/2017:  FINDINGS:   Solid organ evaluation is limited without contrast.      Multiple scattered intraperitoneal soft tissue nodules are overall increased. A  representative soft tissue mass in the pelvis adjacent to the rectum measures  3.6 x 3.8 cm (series 3, image 77), previously 1.6 x 1.9 cm. A representative  soft tissue nodule in the left hemipelvis measures 3.2 x 3.3 cm (series 3, image  70), previously 2.5 x 2.9 cm. An adjacent soft tissue nodule laterally measures  3.4 x 4.7 cm (series 3, image 72), previously 2.7 x 4.2 cm. A representative  conglomerate of soft tissue nodules adjacent to the pancreatic tail measures 3.6  x 4.8 cm (series 3, image 21), previously 3.2 x 3.4 cm.     A right inguinal lymph node measures 2.2 x 3.1 cm (series 3, image 83),  previously 1.8 x 2.6 cm.      The visualized lung bases demonstrate no mass or consolidation. The heart size  is normal. There is no pericardial or pleural effusion.     A 1.2 cm calculus is again seen in the left renal pelvis with new mild left  hydronephrosis and mild fat stranding adjacent to the left renal hilum. Several  small nonobstructing calculi are also seen bilaterally. The urinary bladder is  grossly normal.     The liver, spleen, pancreas, and adrenal glands are normal. The gall bladder is  present without intra- or extra-hepatic biliary dilatation.      There are no dilated bowel loops. There is no free fluid or free air. The aorta  tapers without aneurysm.     The uterus and ovaries are surgically absent. There is no pelvic mass.  There are  stable degenerative changes in the spine. There is no aggressive blastic or  lytic bony lesion.     IMPRESSION:   1. Overall progression of metastatic disease with multiple intraperitoneal soft  tissue implants. There is no ascites. There is also increased right inguinal  lymphadenopathy.     2. At 1.2 cm calculus is again seen in the left renal collecting system. There  is new mild left hydronephrosis and inflammation adjacent to the left renal  hilum. Correlation with urinalysis for infection is suggested. Pathology:   FINAL PATHOLOGIC DIAGNOSIS  1. Omentum, omentectomy:  Serous carcinoma, high grade  2. Soft tissue, umbilicus, biopsy:  Serous carcinoma, high grade  3. Peritoneum, periappendiceal, biopsy:  Serous carcinoma, high grade  4. Colonic mesentery, biopsy:  Serous carcinoma, high grade  See comment  5.  Uterus (157 grams), ovaries and fallopian tubes, supracervical hysterectomy and bilateral salpingooophorectomy:  Cervix: No diagnostic pathologic abnormality  Myometrium: No diagnostic pathologic abnormality  Endometrium: Benign polyp and cystic atrophy  Ovaries and fallopian tubes: Serous carcinoma, high grade  Serosa: Serous carcinoma implants, high-grade  See synoptic report  Synoptic Report:  Specimen: Uterus, ovaries and fallopian tubes, omentum  Procedure: Supracervical hysterectomy, bilateral salpingo-oophorectomy, omentectomy  Lymph node sampling: No lymph nodes present  Specimen integrity:  Right ovary: Intact        Assessment:     Patient Active Problem List   Diagnosis Code    Histor of ovarian cancer Z85.43    Carcinomatosis ([address] C80.0    Type II diabetes mellitus, uncontrolled ([address] E11.65    Morbid obesity ([address] E66.01    Abnormal mammogram R92.8    Sleep apnea G47.30    Dyslipidemia (high LDL; low HDL) E78.4    Benign essential hypertension I10    Disorder of thyroid E07.9    CINV (chemotherapy-induced nausea and vomiting) R11.2, T45.1X5A    Swelling of eye, bilateral--left > right H57.8    Portacath in place--left Z95.828    Chest pain, atypical R07.89    Hx of pulmonary embolus during pregnancy Z86.711, Z87.59    Elevated CA-125 R97.1    Ovarian cancer (HCC) C56.9    Hyperglycemia due to type 2 diabetes mellitus ([facility] E11.65    Preop cardiovascular exam Z01.810    Angina pectoris associated with type 2 diabetes mellitus ([facility] E11.59, I20.9         Plan: Will proceed with second cycle of Gemzar  I will check a urine culture since pt is still having complaints of frequency and urgency. She will let us know if she has any temps of 100.4 or greater, chills or begins with dysuria  She will follow up with urologist regarding hydronephrosis/stone abnormal urinalysis and procedure to remove stones. Hopefully, will help with elevated creat  Will give extra liter of fluid since Stress Test is negative and BUN is elevated some.    Follow up with cardiologist regarding stress test results/murmur  Follow up with PCP re-DM/HTN/Lipids (noted lipid profile drawn 2/27)  Continue current meds  Hydration encouraged  Encouraged to call for any concerns or questions  Maya Fuentes NP

## 2017-03-02 NOTE — PROGRESS NOTES
[facility] - Chemotherapy Progress Note    4952 Pt admit to [facility] for Gemzar Cycle 2 ambulatory in stable condition with supportive son. Assessment completed. No new concerns voiced. Port accessed with positive blood return. Pt had labs drawn on 2/28/17. Labs reviewed, chemo ordered. Pt connected to [facility] fluids at [facility]. Vika Price NP at bedside, adding 1 liter NS bolus today and UA/culture due to frequency and urgency. Visit Vitals    /78    Pulse 68    Temp 97 °F (36.1 °C)    Resp 18    Ht 5' 8.5\" (1.74 m)    Wt 150.6 kg (332 lb)    LMP 09/07/2011    Breastfeeding No    BMI 49.75 kg/m2       Medications:  NS  Kytril  Gemzar  1 liter NS bolus    1250 Pt tolerated treatment well. Port maintained positive blood return throughout treatment, flushed with positive blood return at conclusion and heparinized and de-accessed. D/c home ambulatory in no distress. Pt aware of next [facility] appointment scheduled for 3/9/17.     Recent Results (from the past 12 hour(s))   URINALYSIS W/MICROSCOPIC    Collection Time: 03/02/17 11:32 AM   Result Value Ref Range    Color YELLOW/STRAW      Appearance TURBID (A) CLEAR      Specific gravity 1.019 1.003 - 1.030      pH (UA) 5.5 5.0 - 8.0      Protein 100 (A) NEG mg/dL    Glucose NEGATIVE  NEG mg/dL    Ketone NEGATIVE  NEG mg/dL    Bilirubin NEGATIVE  NEG      Blood LARGE (A) NEG      Urobilinogen 0.2 0.2 - 1.0 EU/dL    Nitrites POSITIVE (A) NEG      Leukocyte Esterase LARGE (A) NEG      WBC >100 (H) 0 - 4 /hpf    RBC >100 (H) 0 - 5 /hpf    Epithelial cells MODERATE (A) FEW /lpf    Bacteria 3+ (A) NEG /hpf     Culture results still pending in CC

## 2017-03-02 NOTE — PROGRESS NOTES
Results of US earlier today show pt has UTI and culture was sent at the time.   Discussed with Dr. Carmelina Epley and I will start her on Levaquin 250 mg daily X10 days  Will follow up on urine culture  Instructed pt to call for any worsening of symptoms, temp of 100.4 or greater or any concerns or questions    Results for orders placed or performed in visit on 09/30/15   AMB POC URINALYSIS DIP STICK AUTO W/O MICRO     Status: None   Result Value Ref Range Status    Color (UA POC) Brown  Final    Clarity (UA POC) Cloudy  Final    Glucose (UA POC) Negative Negative Final    Bilirubin (UA POC) Negative Negative Final    Ketones (UA POC) Negative Negative Final    Specific gravity (UA POC) 1.025 1.001 - 1.035 Final    Blood (UA POC) 3+ Negative Final    pH (UA POC) 5.0 4.6 - 8.0 Final    Protein (UA POC) 3+ Negative mg/dL Final    Urobilinogen (UA POC) 0.2 mg/dL 0.2 - 1 Final    Nitrites (UA POC) Negative Negative Final    Leukocyte esterase (UA POC) 3+ Negative Final

## 2017-03-03 ENCOUNTER — OFFICE VISIT (OUTPATIENT)
Dept: ENDOCRINOLOGY | Age: 57
End: 2017-03-03

## 2017-03-03 ENCOUNTER — TELEPHONE (OUTPATIENT)
Dept: CARDIOLOGY CLINIC | Age: 57
End: 2017-03-03

## 2017-03-03 VITALS
DIASTOLIC BLOOD PRESSURE: 64 MMHG | HEART RATE: 64 BPM | SYSTOLIC BLOOD PRESSURE: 119 MMHG | WEIGHT: 293 LBS | HEIGHT: 69 IN | BODY MASS INDEX: 43.4 KG/M2

## 2017-03-03 DIAGNOSIS — E11.9 CONTROLLED TYPE 2 DIABETES MELLITUS WITHOUT COMPLICATION, WITH LONG-TERM CURRENT USE OF INSULIN (HCC): Primary | ICD-10-CM

## 2017-03-03 DIAGNOSIS — E78.5 DYSLIPIDEMIA (HIGH LDL; LOW HDL): ICD-10-CM

## 2017-03-03 DIAGNOSIS — Z79.4 CONTROLLED TYPE 2 DIABETES MELLITUS WITHOUT COMPLICATION, WITH LONG-TERM CURRENT USE OF INSULIN (HCC): Primary | ICD-10-CM

## 2017-03-03 DIAGNOSIS — I10 BENIGN ESSENTIAL HYPERTENSION: ICD-10-CM

## 2017-03-03 DIAGNOSIS — E07.9 DISORDER OF THYROID: ICD-10-CM
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
## 2017-03-03 NOTE — MR AVS SNAPSHOT
Visit Information Date & Time Provider Department Dept. Phone Encounter #  
 3/3/2017 11:30 AM Jose Roberto Voss, [facility] [phone] [id number] Follow-up Instructions Return in about 6 months (around 9/3/2017). Your Appointments 3/28/2017  9:00 AM  
ESTABLISHED PATIENT with Isa Pizarro MD  
[facility]) Appt Note: F/U TO DISCUSS ECHO RESULTS  
 [address] [facility] [address] [place]  
[phone] [address] [place]  
  
    
 3/28/2017  1:45 PM  
CHEMOTHERAPY with Darius Antonio MD  
[facility] [address]) Appt Note: 3rd floor labs today [address] [phone]  
[address] [facility] [address] [place] 6/13/2017  8:15 AM  
ROUTINE CARE with Tiffany Trimble MD  
[facility] [address]) Appt Note: 6 month follow up  
 [facility] [address] [facility]  
[phone]  
  
   
 [facility] [address] [facility] [address] [facility]  
  
    
 2/15/2018  9:00 AM  
ESTABLISHED PATIENT with Isa Pizarro MD  
[facility]) Appt Note: 1 YR F/U  
 [address] [place]  
[phone] Upcoming Health Maintenance Date Due Hepatitis C Screening 1960 Pneumococcal 19-64 Highest Risk (1 of 3 - PCV13) 7/28/1980 DTaP/Tdap/Td series (1 - Tdap) 7/28/1982 COLONOSCOPY 10/14/2016 MEDICARE YEARLY EXAM 10/15/2016 HEMOGLOBIN A1C Q6M 8/27/2017 PAP AKA CERVICAL CYTOLOGY 8/29/2017 FOOT EXAM Q1 9/2/2017 EYE EXAM RETINAL OR DILATED Q1 11/15/2017 MICROALBUMIN Q1 2/27/2018 LIPID PANEL Q1 2/27/2018 BREAST CANCER SCRN MAMMOGRAM 9/22/2018 Allergies as of 3/3/2017  Review Complete On: 3/3/2017 By: Deepali Holcomb MD  
  
 Severity Noted Reaction Type Reactions Carboplatin  08/29/2014    Other (comments) Patient developed shortness of breath, felt faint, coughing, skin flushed and \"itchy\". This occurred on the patients 8th treatment. Iodinated Contrast Media - Oral And Iv Dye  03/25/2012   Topical Rash Lipitor [Atorvastatin]  04/04/2014    Myalgia Percocet [Oxycodone-acetaminophen]  03/25/2012   Not Verified Other (comments) fever Shellfish Containing Products  10/04/2012    Hives Current Immunizations  Reviewed on 3/2/2017 Name Date Influenza Vaccine (Quad) PF 12/13/2016 Not reviewed this visit You Were Diagnosed With   
  
 Codes Comments Controlled type 2 diabetes mellitus without complication, with long-term current use of insulin (HCC)    -  Primary ICD-10-CM: E11.9, Z79.4 ICD-9-CM: 250.00, V58.67 Benign essential hypertension     ICD-10-CM: I10 
ICD-9-CM: 401.1 Dyslipidemia (high LDL; low HDL)     ICD-10-CM: E78.4 ICD-9-CM: 272.4 Disorder of thyroid     ICD-10-CM: E07.9 ICD-9-CM: 246. 9 Vitals BP  
  
  
  
  
  
 119/64 (BP 1 Location: Left arm, BP Patient Position: Sitting) Vitals History BMI and BSA Data Body Mass Index Body Surface Area  
 51.12 kg/m 2 2.74 m 2 Preferred Pharmacy Pharmacy Name Phone [id number]- [id number] [address] [facility] [phone] Your Updated Medication List  
  
   
This list is accurate as of: 3/3/17  1:04 PM.  Always use your most recent med list.  
  
  
  
  
 * albuterol 90 mcg/actuation inhaler Commonly known as:  PROVENTIL HFA, VENTOLIN HFA, PROAIR HFA Take 2 Puffs by inhalation every four (4) hours as needed for Wheezing. * albuterol 2.5 mg /3 mL (0.083 %) nebulizer solution Commonly known as:  PROVENTIL VENTOLIN  
 3 mL by Nebulization route every four (4) hours as needed for Wheezing. ALIGN 4 mg Cap Generic drug:  Bifidobacterium Infantis Take 1 Cap by mouth daily. ALLEGRA 180 mg tablet Generic drug:  fexofenadine Take 180 mg by mouth daily. BD INSULIN PEN NEEDLE UF SHORT 31 gauge x 5/16\" Ndle Generic drug:  Insulin Needles (Disposable) USE WITH INSULIN TO INJECT FIVE TIMES DAILY. Blood-Glucose Meter monitoring kit Use as directed to test blood sugar three times daily-DX-DM-250.00  
  
 cholecalciferol (VITAMIN D3) 5,000 unit Tab tablet Commonly known as:  VITAMIN D3 Take  by mouth daily. clonazePAM 1 mg tablet Commonly known as:  KlonoPIN  
TAKE 1 TABLET BY MOUTH TWICE A DAY  
  
 fluticasone-salmeterol 230-21 mcg/actuation inhaler Commonly known as:  ADVAIR HFA Take 2 Puffs by inhalation two (2) times a day. furosemide 40 mg tablet Commonly known as:  LASIX Take 80 mg by mouth as needed. HYDROcodone-acetaminophen 5-325 mg per tablet Commonly known as:  John Songster Take 1 Tab by mouth. Lancets Misc Use as directed to test blood sugar three times daily-DX-DM-250.00 LEVEMIR FLEXTOUCH 100 unit/mL (3 mL) Inpn Generic drug:  insulin detemir 30 UNITS IN THE MORNING AND 50 UNITS AT NIGHT. INCREASE AS DIRECTED TO MAX  UNITS PER DAY  
  
 levoFLOXacin 250 mg tablet Commonly known as:  Tom Johnson Take 1 Tab by mouth daily. levothyroxine 150 mcg tablet Commonly known as:  SYNTHROID  
TAKE 1 TAB BY MOUTH DAILY (BEFORE BREAKFAST). Liraglutide 0.6 mg/0.1 mL (18 mg/3 mL) sub-q pen Commonly known as:  VICTOZA  
0.3 mL by SubCUTAneous route daily (with lunch). LORazepam 1 mg tablet Commonly known as:  ATIVAN Take 1 Tab by mouth every six (6) hours as needed for Anxiety. Max Daily Amount: 4 mg. Indications: CANCER CHEMOTHERAPY-INDUCED NAUSEA AND VOMITING  
  
 methocarbamol 500 mg tablet Commonly known as:  ROBAXIN  
 Take  by mouth three (3) times daily. montelukast 10 mg tablet Commonly known as:  SINGULAIR Take 1 Tab by mouth daily. NASONEX 50 mcg/actuation nasal spray Generic drug:  mometasone 2 sprays by Both Nostrils route as needed. NovoLOG Flexpen 100 unit/mL Inpn Generic drug:  insulin aspart INJECT 20 UNITS BEFORE EACH MEAL + 4 UNITS FOR EVERY 50 MG/DL ABOVE 150 MG/DL.  UNITS PER DAY  
  
 omega-3 acid ethyl esters 1 gram capsule Commonly known as:  [place] TAKE ONE CAPSULE BY MOUTH TWICE A DAY  
  
 ondansetron 8 mg disintegrating tablet Commonly known as:  ZOFRAN ODT Take 1 Tab by mouth every eight (8) hours as needed for Nausea. oxyCODONE-acetaminophen 7.5-325 mg per tablet Commonly known as:  PERCOCET 7.5 Take 1 Tab by mouth every four (4) hours as needed for Pain. Max Daily Amount: 6 Tabs. potassium chloride SR 10 mEq tablet Commonly known as:  KLOR-CON 10 Take 1 Tab by mouth two (2) days a week. sertraline 50 mg tablet Commonly known as:  ZOLOFT  
TAKE 1 TABLET BY MOUTH EVERY MORNING  
  
 valsartan 160 mg tablet Commonly known as:  DIOVAN  
TAKE 1 TAB BY MOUTH DAILY. REPLACES VALSARTAN-HCTZ * Notice: This list has 2 medication(s) that are the same as other medications prescribed for you. Read the directions carefully, and ask your doctor or other care provider to review them with you. We Performed the Following HEMOGLOBIN A1C WITH EAG [15074 CPT(R)] LIPID PANEL [13076 CPT(R)] METABOLIC PANEL, COMPREHENSIVE [71415 CPT(R)] MICROALBUMIN, UR, RAND W/ MICROALBUMIN/CREA RATIO A2841118 CPT(R)] TSH 3RD GENERATION [06867 CPT(R)] Follow-up Instructions Return in about 6 months (around 9/3/2017). To-Do List   
 03/09/2017 11:00 AM  
  Appointment with [address] [place] [address] [address] at [facility] ([phone])  
  
 03/30/2017 9:00 AM  
  Appointment with [address] at [facility] ([phone])  
  
 04/06/2017 10:00 AM  
  [address] at [facility] ([phone]) Patient Instructions 1) Your Hemoglobin A1c (3 month test of blood sugar) is still very good at 6.4%. Keep up the good work. 2) Your urine protein was high again due to recent kidney stones and infection so we'll repeat this again at the next visit. 3) Your cholesterol was higher due to diet. Try to limit the amount of fried foods, fatty foods, butter, gravy, red meat, ice cream, cheese and eggs in your diet, which are all high in cholesterol. 4) Your TSH (thyroid test) is normal and your blood pressure looks great. 5) Fax your blood sugar readings to [phone]. Introducing [facility]! Dear Marques Singleton: Thank you for requesting a [employer] account. Our records indicate that you already have an active [employer] account. You can access your account anytime at [email or web address] Did you know that you can access your hospital and ER discharge instructions at any time in [employer]? You can also review all of your test results from your hospital stay or ER visit. Additional Information If you have questions, please visit the Frequently Asked Questions section of the [employer] website at [email or web address] Remember, [employer] is NOT to be used for urgent needs. For medical emergencies, dial 911. Now available from your iPhone and Android! Please provide this summary of care documentation to your next provider. Your primary care clinician is listed as Gloria Saenz. If you have any questions after today's visit, please call [phone].

## 2017-03-03 NOTE — PATIENT INSTRUCTIONS
1) Your Hemoglobin A1c (3 month test of blood sugar) is still very good at 6.4%. Keep up the good work. 2) Your urine protein was high again due to recent kidney stones and infection so we'll repeat this again at the next visit. 3) Your cholesterol was higher due to diet. Try to limit the amount of fried foods, fatty foods, butter, gravy, red meat, ice cream, cheese and eggs in your diet, which are all high in cholesterol. 4) Your TSH (thyroid test) is normal and your blood pressure looks great. 5) Fax your blood sugar readings to [phone].

## 2017-03-04 LAB
BACTERIA SPEC CULT: ABNORMAL
CC UR VC: ABNORMAL
SERVICE CMNT-IMP: ABNORMAL

## 2017-03-06 ENCOUNTER — TELEPHONE (OUTPATIENT)
Dept: CARDIOLOGY CLINIC | Age: 57
End: 2017-03-06

## 2017-03-06 DIAGNOSIS — C56.9 OVARIAN CANCER, UNSPECIFIED LATERALITY (HCC): ICD-10-CM

## 2017-03-06 DIAGNOSIS — C80.0 CARCINOMATOSIS (HCC): ICD-10-CM

## 2017-03-06 DIAGNOSIS — N30.00 ACUTE CYSTITIS WITHOUT HEMATURIA: ICD-10-CM

## 2017-03-06 NOTE — TELEPHONE ENCOUNTER
Received VM from [place] with Dr. Horacio Thomas office, ph # [phone]. Verified patient with two patient identifiers. Spoke with Greg River in Dr. Horacio Thomas office. States they can see the echo and EST results, do not need us. She verbalized understanding.

## 2017-03-07 RX ORDER — GRANISETRON HYDROCHLORIDE 1 MG/ML
1 INJECTION, SOLUTION INTRAVENOUS ONCE
Status: COMPLETED | OUTPATIENT
Start: 2017-03-09 | End: 2017-03-09

## 2017-03-09 ENCOUNTER — TELEPHONE (OUTPATIENT)
Dept: GYNECOLOGY | Age: 57
End: 2017-03-09

## 2017-03-09 ENCOUNTER — HOSPITAL ENCOUNTER (OUTPATIENT)
Dept: INFUSION THERAPY | Age: 57
Discharge: HOME | End: 2017-03-09
Payer: MEDICARE

## 2017-03-09 VITALS
DIASTOLIC BLOOD PRESSURE: 77 MMHG | RESPIRATION RATE: 18 BRPM | SYSTOLIC BLOOD PRESSURE: 128 MMHG | WEIGHT: 293 LBS | HEIGHT: 69 IN | HEART RATE: 67 BPM | BODY MASS INDEX: 43.4 KG/M2 | TEMPERATURE: 96.8 F

## 2017-03-09 LAB
BASO+EOS+MONOS # BLD AUTO: 0.4 K/UL (ref 0.2–1.2)
BASO+EOS+MONOS # BLD AUTO: 8 % (ref 3.2–16.9)
DIFFERENTIAL METHOD BLD: ABNORMAL
ERYTHROCYTE [DISTWIDTH] IN BLOOD BY AUTOMATED COUNT: 13.3 % (ref 11.8–15.8)
HCT VFR BLD AUTO: 25.2 % (ref 35–47)
HGB BLD-MCNC: 8.1 G/DL (ref 11.5–16)
LYMPHOCYTES # BLD AUTO: 47 % (ref 12–49)
LYMPHOCYTES # BLD: 2.2 K/UL (ref 0.8–3.5)
MCH RBC QN AUTO: 28.5 PG (ref 26–34)
MCHC RBC AUTO-ENTMCNC: 32.1 G/DL (ref 30–36.5)
MCV RBC AUTO: 88.7 FL (ref 80–99)
NEUTS SEG # BLD: 2.1 K/UL (ref 1.8–8)
NEUTS SEG NFR BLD AUTO: 44 % (ref 32–75)
PLATELET # BLD AUTO: 108 K/UL (ref 150–400)
RBC # BLD AUTO: 2.84 M/UL (ref 3.8–5.2)
WBC # BLD AUTO: 4.7 K/UL (ref 3.6–11)

## 2017-03-09 PROCEDURE — 77030012965 HC NDL HUBR BBMI -A

## 2017-03-09 PROCEDURE — 96375 TX/PRO/DX INJ NEW DRUG ADDON: CPT

## 2017-03-09 PROCEDURE — 74011250636 HC RX REV CODE- 250/636: Performed by: OBSTETRICS & GYNECOLOGY

## 2017-03-09 PROCEDURE — 96413 CHEMO IV INFUSION 1 HR: CPT

## 2017-03-09 PROCEDURE — 74011000250 HC RX REV CODE- 250: Performed by: OBSTETRICS & GYNECOLOGY

## 2017-03-09 PROCEDURE — 86901 BLOOD TYPING SEROLOGIC RH(D): CPT | Performed by: OBSTETRICS & GYNECOLOGY

## 2017-03-09 PROCEDURE — 85025 COMPLETE CBC W/AUTO DIFF WBC: CPT | Performed by: OBSTETRICS & GYNECOLOGY

## 2017-03-09 PROCEDURE — 36415 COLL VENOUS BLD VENIPUNCTURE: CPT | Performed by: OBSTETRICS & GYNECOLOGY

## 2017-03-09 PROCEDURE — 86304 IMMUNOASSAY TUMOR CA 125: CPT | Performed by: NURSE PRACTITIONER

## 2017-03-09 PROCEDURE — 86920 COMPATIBILITY TEST SPIN: CPT | Performed by: OBSTETRICS & GYNECOLOGY

## 2017-03-09 RX ORDER — SODIUM CHLORIDE 0.9 % (FLUSH) 0.9 %
10 SYRINGE (ML) INJECTION AS NEEDED
Status: ACTIVE | OUTPATIENT
Start: 2017-03-09 | End: 2017-03-10

## 2017-03-09 RX ORDER — SODIUM CHLORIDE 9 MG/ML
50 INJECTION, SOLUTION INTRAVENOUS AS NEEDED
Status: DISPENSED | OUTPATIENT
Start: 2017-03-09 | End: 2017-03-10

## 2017-03-09 RX ORDER — SODIUM CHLORIDE 9 MG/ML
250 INJECTION, SOLUTION INTRAVENOUS AS NEEDED
Status: DISPENSED | OUTPATIENT
Start: 2017-03-09 | End: 2017-03-10

## 2017-03-09 RX ORDER — SODIUM CHLORIDE 9 MG/ML
10 INJECTION INTRAMUSCULAR; INTRAVENOUS; SUBCUTANEOUS AS NEEDED
Status: ACTIVE | OUTPATIENT
Start: 2017-03-09 | End: 2017-03-10

## 2017-03-09 RX ORDER — HEPARIN 100 UNIT/ML
500 SYRINGE INTRAVENOUS AS NEEDED
Status: ACTIVE | OUTPATIENT
Start: 2017-03-09 | End: 2017-03-10

## 2017-03-09 RX ADMIN — Medication 10 ML: at 14:20

## 2017-03-09 RX ADMIN — Medication 10 ML: at 11:28

## 2017-03-09 RX ADMIN — Medication 500 UNITS: at 14:15

## 2017-03-09 RX ADMIN — SODIUM CHLORIDE 10 ML: 9 INJECTION, SOLUTION INTRAMUSCULAR; INTRAVENOUS; SUBCUTANEOUS at 11:28

## 2017-03-09 RX ADMIN — SODIUM CHLORIDE 2192 MG: 900 INJECTION, SOLUTION INTRAVENOUS at 13:31

## 2017-03-09 RX ADMIN — SODIUM CHLORIDE 50 ML/HR: 900 INJECTION, SOLUTION INTRAVENOUS at 11:28

## 2017-03-09 RX ADMIN — GRANISETRON HYDROCHLORIDE 1 MG: 1 INJECTION, SOLUTION INTRAVENOUS at 12:30

## 2017-03-09 NOTE — PROGRESS NOTES
[facility] Note:  1100Pt arrived at [facility] ambulatory and in no distress for C2D8. Assessment stable no new complaints voiced. She has had headache this morning abd she thinks it is because of her tooth that needs to come out. Her dentist talked to her oncologist but no plans yet to remve tooth. Labs came back and Hgb and platellets have dropped. JAD Hernandez and Gayle Landrum, rn talked with pt and decision was made to have blood transfusion before she  Dropped too far. Specimen obtained. Port left accessed. Pt also has heart condition and diabetes. Ca 125 added to lab. Medications received  :Kytril  Gemzar       1415 Tolerated treatment well, no adverse reaction noted. D/Cd from [facility] ambulatory and in no distress accompanied by son. Next appt 3/10  1000  Visit Vitals    /77    Pulse 67    Temp 96.8 °F (36 °C)    Resp 18    Ht 5' 8.5\" (1.74 m)    Wt 154.2 kg (340 lb)    LMP 09/07/2011    BMI 50.94 kg/m2     Recent Results (from the past 12 hour(s))   CBC WITH 3 PART DIFF    Collection Time: 03/09/17 11:20 AM   Result Value Ref Range    WBC 4.7 3.6 - 11.0 K/uL    RBC 2.84 (L) 3.80 - 5.20 M/uL    HGB 8.1 (L) 11.5 - 16.0 g/dL    HCT 25.2 (L) 35.0 - 47.0 %    MCV 88.7 80.0 - 99.0 FL    MCH 28.5 26.0 - 34.0 PG    MCHC 32.1 30.0 - 36.5 g/dL    RDW 13.3 11.8 - 15.8 %    PLATELET 595 (L) 781 - 400 K/uL    NEUTROPHILS 44 32 - 75 %    MIXED CELLS 8 3.2 - 16.9 %    LYMPHOCYTES 47 12 - 49 %    ABS. NEUTROPHILS 2.1 1.8 - 8.0 K/UL    ABS. MIXED CELLS 0.4 0.2 - 1.2 K/uL    ABS.  LYMPHOCYTES 2.2 0.8 - 3.5 K/UL    DF AUTOMATED

## 2017-03-09 NOTE — TELEPHONE ENCOUNTER
Spoke with pt in reference to lab results for today's chemo tx. Her last labs drawn were on 2/28/17. I told pt we would be drawing labs today pre chemo since these labs were 5days old and we would like to see her kidney function, and she is in agreement with that. Transferred call to KEIRA Funk to f/u on pt having tooth pulled with root canal.  Dentist to speak with Dr. Hernan Sanchez.

## 2017-03-09 NOTE — TELEPHONE ENCOUNTER
Pt  and states she went to the dentist on Saturday and needs to either have a tooth pulled or a root canal.  She is leaning toward having her tooth pulled due to it's more cost effective. She states the cost for a root les is $1,000. Dr. Nilay Severino, dentist, needs clearance from Dr. Avtar Michel before he will perform either. If Dr. Avtar Michel states it is ok to have tooth pulled, a letter needs to be faxed to Dr. Connor Rocha office, fax # [phone]. Pt was advised Dr. Avtar Michel not in office today, will verify with him tomorrow 3/10/17 and I will call pt back and inform her of md's recommendations. Pt verbalized understanding.

## 2017-03-10 ENCOUNTER — HOSPITAL ENCOUNTER (OUTPATIENT)
Dept: INFUSION THERAPY | Age: 57
Discharge: HOME | End: 2017-03-10
Payer: MEDICARE

## 2017-03-10 VITALS
DIASTOLIC BLOOD PRESSURE: 61 MMHG | OXYGEN SATURATION: 94 % | RESPIRATION RATE: 18 BRPM | SYSTOLIC BLOOD PRESSURE: 107 MMHG | TEMPERATURE: 98.3 F | HEART RATE: 70 BPM

## 2017-03-10 LAB — CANCER AG125 SERPL-ACNC: 181 U/ML (ref 0–30)

## 2017-03-10 PROCEDURE — P9016 RBC LEUKOCYTES REDUCED: HCPCS | Performed by: OBSTETRICS & GYNECOLOGY

## 2017-03-10 PROCEDURE — 77030013169 SET IV BLD ICUM -A

## 2017-03-10 PROCEDURE — 74011250637 HC RX REV CODE- 250/637: Performed by: NURSE PRACTITIONER

## 2017-03-10 PROCEDURE — 74011250636 HC RX REV CODE- 250/636: Performed by: NURSE PRACTITIONER

## 2017-03-10 PROCEDURE — 36430 TRANSFUSION BLD/BLD COMPNT: CPT

## 2017-03-10 RX ORDER — SODIUM CHLORIDE 9 MG/ML
25 INJECTION, SOLUTION INTRAVENOUS CONTINUOUS
Status: DISPENSED | OUTPATIENT
Start: 2017-03-10 | End: 2017-03-11

## 2017-03-10 RX ORDER — ACETAMINOPHEN 325 MG/1
650 TABLET ORAL ONCE
Status: COMPLETED | OUTPATIENT
Start: 2017-03-10 | End: 2017-03-10

## 2017-03-10 RX ORDER — SODIUM CHLORIDE 9 MG/ML
10 INJECTION INTRAMUSCULAR; INTRAVENOUS; SUBCUTANEOUS AS NEEDED
Status: DISCONTINUED | OUTPATIENT
Start: 2017-03-10 | End: 2017-03-10

## 2017-03-10 RX ORDER — DIPHENHYDRAMINE HCL 25 MG
25 CAPSULE ORAL ONCE
Status: COMPLETED | OUTPATIENT
Start: 2017-03-10 | End: 2017-03-10

## 2017-03-10 RX ORDER — HEPARIN 100 UNIT/ML
500 SYRINGE INTRAVENOUS AS NEEDED
Status: ACTIVE | OUTPATIENT
Start: 2017-03-10 | End: 2017-03-11

## 2017-03-10 RX ORDER — SODIUM CHLORIDE 9 MG/ML
250 INJECTION, SOLUTION INTRAVENOUS AS NEEDED
Status: DISCONTINUED | OUTPATIENT
Start: 2017-03-10 | End: 2017-03-14 | Stop reason: HOSPADM

## 2017-03-10 RX ORDER — SODIUM CHLORIDE 0.9 % (FLUSH) 0.9 %
10-40 SYRINGE (ML) INJECTION AS NEEDED
Status: ACTIVE | OUTPATIENT
Start: 2017-03-10 | End: 2017-03-11

## 2017-03-10 RX ADMIN — DIPHENHYDRAMINE HYDROCHLORIDE 25 MG: 25 CAPSULE ORAL at 12:10

## 2017-03-10 RX ADMIN — Medication 10 ML: at 17:35

## 2017-03-10 RX ADMIN — SODIUM CHLORIDE 25 ML/HR: 900 INJECTION, SOLUTION INTRAVENOUS at 12:15

## 2017-03-10 RX ADMIN — ACETAMINOPHEN 650 MG: 325 TABLET ORAL at 12:10

## 2017-03-10 RX ADMIN — Medication 20 ML: at 12:15

## 2017-03-10 RX ADMIN — Medication 500 UNITS: at 17:35

## 2017-03-10 NOTE — DISCHARGE INSTRUCTIONS
OUTPATIENT INFUSION CENTER    DISCHARGE INSTRUCTIONS FOR:  BLOOD TRANSFUSION    We hope you are feeling better after your blood transfusion. Some mild tenderness or slight bruising at your IV site is normal.  Avoid lifting or heavy use of that extremity for the rest of the day. Drink plenty of fluids, eat a normal diet and get some rest.    There are some important signs that you need to watch for in case you experience a delayed reaction to the blood you have received. Call your physician immediately if you develop any of the following symptoms:    1. Severe headache or backache;    2. Fever above 100 degrees;    3. Chills;    4. Difficulty breathing;    5.  Blood or red color in urine;    6. The feeling of weakness or constant fatigue;    7. Yellowing of the whites of your eyes or skin (jaundice). If your physician is not available, call or go to the nearest emergency room, or dial 911.     Karan Winkler, Signature: ___________________________ 3/10/2017  Sweta Lim RN

## 2017-03-10 NOTE — PROGRESS NOTES
Pt arrived to [facility] ambulatory for 2 Units Blood Transfusion in no acute distress at 1100.  Assessment unremarkable except pt reports moderate amount of blood in urine that started yesterday after chemo. Notified MD office, made aware. No new orders obtained. Pt to receive blood transfusion as ordered today. Encouraged pt to notify MD if symptoms worsen. R chest port with florez intact from treatment yesterday, flushed with positive blood return noted. Visit Vitals    /73 (BP 1 Location: Right arm, BP Patient Position: Supine)    Pulse 67    Temp 98.2 °F (36.8 °C)    Resp 18    LMP 09/07/2011    SpO2 94%       The following medications administered:  Tylenol 650mg PO  Benadryl 25mg PO  NS @ KVO  1 Unit PRBC's IV started at 1230, completed at 1425  1 Unit PRBC's IV started at 1440, completed at 1705    Visit Vitals    /61 (BP 1 Location: Right arm, BP Patient Position: Supine)    Pulse 70    Temp 98.3 °F (36.8 °C)    Resp 18    LMP 09/07/2011    SpO2 94%       Pt tolerated treatment well.  No adverse reaction noted. Pt declined to stay 1 hour for monitoring, but pt was observed on unit for 30 minutes post transfusion. Port flushed per policy and needle removed, 2x2 and paper tape placed.  Discharge instructions reviewed with patient and given handout. Pt discharged ambulatory in no acute distress at 1740, accompanied by family member. Next appointment 3/30/17 @ 0900 at [facility].

## 2017-03-11 LAB
ABO + RH BLD: NORMAL
BLD PROD TYP BPU: NORMAL
BLD PROD TYP BPU: NORMAL
BLOOD GROUP ANTIBODIES SERPL: NORMAL
BPU ID: NORMAL
BPU ID: NORMAL
CROSSMATCH RESULT,%XM: NORMAL
CROSSMATCH RESULT,%XM: NORMAL
SPECIMEN EXP DATE BLD: NORMAL
STATUS OF UNIT,%ST: NORMAL
STATUS OF UNIT,%ST: NORMAL
UNIT DIVISION, %UDIV: 0
UNIT DIVISION, %UDIV: 0

## 2017-03-12 ENCOUNTER — HOSPITAL ENCOUNTER (EMERGENCY)
Age: 57
Discharge: HOME | End: 2017-03-12
Attending: EMERGENCY MEDICINE
Payer: MEDICARE

## 2017-03-12 ENCOUNTER — APPOINTMENT (OUTPATIENT)
Dept: ULTRASOUND IMAGING | Age: 57
End: 2017-03-12
Attending: PHYSICIAN ASSISTANT
Payer: MEDICARE

## 2017-03-12 ENCOUNTER — APPOINTMENT (OUTPATIENT)
Dept: CT IMAGING | Age: 57
End: 2017-03-12
Attending: PHYSICIAN ASSISTANT
Payer: MEDICARE

## 2017-03-12 VITALS
BODY MASS INDEX: 43.4 KG/M2 | RESPIRATION RATE: 19 BRPM | WEIGHT: 293 LBS | OXYGEN SATURATION: 97 % | HEART RATE: 68 BPM | TEMPERATURE: 97.8 F | HEIGHT: 69 IN | SYSTOLIC BLOOD PRESSURE: 114 MMHG | DIASTOLIC BLOOD PRESSURE: 97 MMHG

## 2017-03-12 DIAGNOSIS — K08.89 DENTALGIA: ICD-10-CM

## 2017-03-12 DIAGNOSIS — L03.116 CELLULITIS OF LEFT LOWER EXTREMITY: Primary | ICD-10-CM

## 2017-03-12 DIAGNOSIS — Z85.43 HISTORY OF OVARIAN CANCER: ICD-10-CM

## 2017-03-12 DIAGNOSIS — N30.01 ACUTE CYSTITIS WITH HEMATURIA: ICD-10-CM

## 2017-03-12 LAB
ALBUMIN SERPL BCP-MCNC: 2.8 G/DL (ref 3.5–5)
ALBUMIN/GLOB SERPL: 0.5 {RATIO} (ref 1.1–2.2)
ALP SERPL-CCNC: 79 U/L (ref 45–117)
ALT SERPL-CCNC: 23 U/L (ref 12–78)
ANION GAP BLD CALC-SCNC: 9 MMOL/L (ref 5–15)
APPEARANCE UR: ABNORMAL
AST SERPL W P-5'-P-CCNC: 34 U/L (ref 15–37)
BACTERIA URNS QL MICRO: NEGATIVE /HPF
BASOPHILS # BLD AUTO: 0 K/UL (ref 0–0.1)
BASOPHILS # BLD: 0 % (ref 0–1)
BILIRUB SERPL-MCNC: 0.6 MG/DL (ref 0.2–1)
BILIRUB UR QL CFM: NEGATIVE
BUN SERPL-MCNC: 21 MG/DL (ref 6–20)
BUN/CREAT SERPL: 15 (ref 12–20)
CALCIUM SERPL-MCNC: 8.4 MG/DL (ref 8.5–10.1)
CHLORIDE SERPL-SCNC: 110 MMOL/L (ref 97–108)
CK MB CFR SERPL CALC: NORMAL % (ref 0–2.5)
CK MB SERPL-MCNC: <1 NG/ML (ref 5–25)
CK SERPL-CCNC: 276 U/L (ref 26–192)
CO2 SERPL-SCNC: 21 MMOL/L (ref 21–32)
COLOR UR: ABNORMAL
CREAT SERPL-MCNC: 1.42 MG/DL (ref 0.55–1.02)
EOSINOPHIL # BLD: 0.1 K/UL (ref 0–0.4)
EOSINOPHIL NFR BLD: 1 % (ref 0–7)
EPITH CASTS URNS QL MICRO: ABNORMAL /LPF
ERYTHROCYTE [DISTWIDTH] IN BLOOD BY AUTOMATED COUNT: 14.7 % (ref 11.5–14.5)
GLOBULIN SER CALC-MCNC: 5.6 G/DL (ref 2–4)
GLUCOSE SERPL-MCNC: 134 MG/DL (ref 65–100)
GLUCOSE UR STRIP.AUTO-MCNC: NEGATIVE MG/DL
HCT VFR BLD AUTO: 26.7 % (ref 35–47)
HGB BLD-MCNC: 8.8 G/DL (ref 11.5–16)
HGB UR QL STRIP: ABNORMAL
KETONES UR QL STRIP.AUTO: NEGATIVE MG/DL
LACTATE SERPL-SCNC: 1.2 MMOL/L (ref 0.4–2)
LEUKOCYTE ESTERASE UR QL STRIP.AUTO: ABNORMAL
LYMPHOCYTES # BLD AUTO: 15 % (ref 12–49)
LYMPHOCYTES # BLD: 1.1 K/UL (ref 0.8–3.5)
MCH RBC QN AUTO: 29.7 PG (ref 26–34)
MCHC RBC AUTO-ENTMCNC: 33 G/DL (ref 30–36.5)
MCV RBC AUTO: 90.2 FL (ref 80–99)
MONOCYTES # BLD: 0 K/UL (ref 0–1)
MONOCYTES NFR BLD AUTO: 0 % (ref 5–13)
NEUTS SEG # BLD: 6.1 K/UL (ref 1.8–8)
NEUTS SEG NFR BLD AUTO: 84 % (ref 32–75)
NITRITE UR QL STRIP.AUTO: NEGATIVE
PH UR STRIP: 5.5 [PH] (ref 5–8)
PLATELET # BLD AUTO: 125 K/UL (ref 150–400)
POTASSIUM SERPL-SCNC: 4.6 MMOL/L (ref 3.5–5.1)
PROT SERPL-MCNC: 8.4 G/DL (ref 6.4–8.2)
PROT UR STRIP-MCNC: 100 MG/DL
RBC # BLD AUTO: 2.96 M/UL (ref 3.8–5.2)
RBC #/AREA URNS HPF: >100 /HPF (ref 0–5)
SODIUM SERPL-SCNC: 140 MMOL/L (ref 136–145)
SP GR UR REFRACTOMETRY: 1.02 (ref 1–1.03)
TROPONIN I SERPL-MCNC: <0.04 NG/ML
UA: UC IF INDICATED,UAUC: ABNORMAL
UROBILINOGEN UR QL STRIP.AUTO: 0.2 EU/DL (ref 0.2–1)
WBC # BLD AUTO: 7.4 K/UL (ref 3.6–11)
WBC URNS QL MICRO: >100 /HPF (ref 0–4)

## 2017-03-12 PROCEDURE — 96361 HYDRATE IV INFUSION ADD-ON: CPT

## 2017-03-12 PROCEDURE — 36415 COLL VENOUS BLD VENIPUNCTURE: CPT | Performed by: PHYSICIAN ASSISTANT

## 2017-03-12 PROCEDURE — 74011000250 HC RX REV CODE- 250: Performed by: PHYSICIAN ASSISTANT

## 2017-03-12 PROCEDURE — 83605 ASSAY OF LACTIC ACID: CPT | Performed by: PHYSICIAN ASSISTANT

## 2017-03-12 PROCEDURE — 96374 THER/PROPH/DIAG INJ IV PUSH: CPT

## 2017-03-12 PROCEDURE — 74011250636 HC RX REV CODE- 250/636: Performed by: PHYSICIAN ASSISTANT

## 2017-03-12 PROCEDURE — 81001 URINALYSIS AUTO W/SCOPE: CPT | Performed by: PHYSICIAN ASSISTANT

## 2017-03-12 PROCEDURE — 84484 ASSAY OF TROPONIN QUANT: CPT | Performed by: PHYSICIAN ASSISTANT

## 2017-03-12 PROCEDURE — 82550 ASSAY OF CK (CPK): CPT | Performed by: PHYSICIAN ASSISTANT

## 2017-03-12 PROCEDURE — 87086 URINE CULTURE/COLONY COUNT: CPT | Performed by: PHYSICIAN ASSISTANT

## 2017-03-12 PROCEDURE — 93971 EXTREMITY STUDY: CPT

## 2017-03-12 PROCEDURE — 99285 EMERGENCY DEPT VISIT HI MDM: CPT

## 2017-03-12 PROCEDURE — 96375 TX/PRO/DX INJ NEW DRUG ADDON: CPT

## 2017-03-12 PROCEDURE — 74011636320 HC RX REV CODE- 636/320: Performed by: EMERGENCY MEDICINE

## 2017-03-12 PROCEDURE — 82553 CREATINE MB FRACTION: CPT | Performed by: PHYSICIAN ASSISTANT

## 2017-03-12 PROCEDURE — 85025 COMPLETE CBC W/AUTO DIFF WBC: CPT | Performed by: PHYSICIAN ASSISTANT

## 2017-03-12 PROCEDURE — 80053 COMPREHEN METABOLIC PANEL: CPT | Performed by: PHYSICIAN ASSISTANT

## 2017-03-12 PROCEDURE — 71275 CT ANGIOGRAPHY CHEST: CPT

## 2017-03-12 PROCEDURE — 96372 THER/PROPH/DIAG INJ SC/IM: CPT

## 2017-03-12 PROCEDURE — 93005 ELECTROCARDIOGRAM TRACING: CPT

## 2017-03-12 PROCEDURE — 74011250636 HC RX REV CODE- 250/636: Performed by: EMERGENCY MEDICINE

## 2017-03-12 RX ORDER — SODIUM CHLORIDE 9 MG/ML
50 INJECTION, SOLUTION INTRAVENOUS
Status: COMPLETED | OUTPATIENT
Start: 2017-03-12 | End: 2017-03-12

## 2017-03-12 RX ORDER — DOXYCYCLINE HYCLATE 100 MG
100 TABLET ORAL 2 TIMES DAILY
Qty: 14 TAB | Refills: 0 | Status: SHIPPED | OUTPATIENT
Start: 2017-03-12 | End: 2017-03-19

## 2017-03-12 RX ORDER — CEPHALEXIN 500 MG/1
500 CAPSULE ORAL 4 TIMES DAILY
Qty: 28 CAP | Refills: 0 | Status: SHIPPED | OUTPATIENT
Start: 2017-03-12 | End: 2017-03-19

## 2017-03-12 RX ORDER — KETOROLAC TROMETHAMINE 30 MG/ML
30 INJECTION, SOLUTION INTRAMUSCULAR; INTRAVENOUS
Status: COMPLETED | OUTPATIENT
Start: 2017-03-12 | End: 2017-03-12

## 2017-03-12 RX ORDER — HYDROCODONE BITARTRATE AND ACETAMINOPHEN 5; 325 MG/1; MG/1
1 TABLET ORAL
Qty: 20 TAB | Refills: 0 | Status: SHIPPED | OUTPATIENT
Start: 2017-03-12 | End: 2017-07-18 | Stop reason: SDUPTHER

## 2017-03-12 RX ORDER — DIPHENHYDRAMINE HYDROCHLORIDE 50 MG/ML
25 INJECTION, SOLUTION INTRAMUSCULAR; INTRAVENOUS
Status: COMPLETED | OUTPATIENT
Start: 2017-03-12 | End: 2017-03-12

## 2017-03-12 RX ORDER — SODIUM CHLORIDE 0.9 % (FLUSH) 0.9 %
10 SYRINGE (ML) INJECTION
Status: COMPLETED | OUTPATIENT
Start: 2017-03-12 | End: 2017-03-12

## 2017-03-12 RX ADMIN — KETOROLAC TROMETHAMINE 30 MG: 30 INJECTION, SOLUTION INTRAMUSCULAR at 16:01

## 2017-03-12 RX ADMIN — SODIUM CHLORIDE 1000 ML: 900 INJECTION, SOLUTION INTRAVENOUS at 16:01

## 2017-03-12 RX ADMIN — DIPHENHYDRAMINE HYDROCHLORIDE 25 MG: 50 INJECTION, SOLUTION INTRAMUSCULAR; INTRAVENOUS at 18:14

## 2017-03-12 RX ADMIN — Medication 10 ML: at 18:41

## 2017-03-12 RX ADMIN — SODIUM CHLORIDE 50 ML/HR: 900 INJECTION, SOLUTION INTRAVENOUS at 18:41

## 2017-03-12 RX ADMIN — LIDOCAINE HYDROCHLORIDE 250 MG: 10 INJECTION, SOLUTION EPIDURAL; INFILTRATION; INTRACAUDAL; PERINEURAL at 19:45

## 2017-03-12 RX ADMIN — IOPAMIDOL 100 ML: 755 INJECTION, SOLUTION INTRAVENOUS at 18:41

## 2017-03-12 NOTE — DISCHARGE INSTRUCTIONS
Thank you for allowing us to provide you with excellent care today. We hope we addressed all of your concerns and needs. We strive to provide excellent quality care in the Emergency Department. Please rate us as excellent, as anything less than excellent does not meet our expectations. If you feel that you have not received excellent quality care or timely care, please ask to speak to the nurse manager. Please choose us in the future for your continued health care needs. The exam and treatment you received in the Emergency Department were for an urgent problem and are not intended as complete care. It is important that you follow-up with a doctor, nurse practitioner, or physician assistant to:  (1) confirm your diagnosis,  (2) re-evaluation of changes in your illness and treatment, and  (3) for ongoing care. If your symptoms become worse or you do not improve as expected and you are unable to reach your usual health care provider, you should return to the Emergency Department. We are available 24 hours a day. Take this sheet with you when you go to your follow-up visit. If you have any problem arranging the follow-up visit, contact [address] [place] [address] 21 [phone])    Make an appointment with your Primary Care doctor for follow up of this visit. Return to the ER if you are unable to be seen in the time recommended on your discharge instructions. If a prescription has been provided, please have it filled as soon as possible to avoid a delay in treatment. Read the entire medication instruction sheet provided to you by the pharmacy. If you have any questions or reservations about taking the medication due to side effects or interactions with other medications please call the ER or your primary care physician. The exam and treatment you received in the Emergency Department were for an emergent problem and is not intended as complete care.  It is important that you follow up with a doctor, nurse practitioner, or [phone] assistant for ongoing care. If your symptoms worsen or you do not improve as expected and you are unable to reach your usual health care provider, you should return to the Emergency Department. We are available 24 hours a day. You may be contacted by a [address] for your opinion of this visit. We would appreciate it if you answer \"very satisfied\" to the survey questions. If you are unable to answer that you are \"very satisfied\" with your visit please contact our nurse manager at [phone] to discuss your concerns. We strive to do the best we can and your opinions are important to us and anything less that \"very satisfied\" is not acceptable to [facility] or [facility]. Cellulitis: Care Instructions  Your Care Instructions    Cellulitis is a skin infection. It often occurs after a break in the skin from a scrape, cut, bite, or puncture, or after a rash. The doctor has checked you carefully, but problems can develop later. If you notice any problems or new symptoms, get medical treatment right away. Follow-up care is a key part of your treatment and safety. Be sure to make and go to all appointments, and call your doctor if you are having problems. It's also a good idea to know your test results and keep a list of the medicines you take. How can you care for yourself at home? · Take your antibiotics as directed. Do not stop taking them just because you feel better. You need to take the full course of antibiotics. · Prop up the infected area on pillows to reduce pain and swelling. Try to keep the area above the level of your heart as often as you can. · If your doctor told you how to care for your wound, follow your doctor's instructions. If you did not get instructions, follow this general advice:  ¨ Wash the wound with clean water 2 times a day. Don't use hydrogen peroxide or alcohol, which can slow healing.   ¨ You may cover the wound with a thin layer of petroleum jelly, such as Vaseline, and a nonstick bandage. ¨ Apply more petroleum jelly and replace the bandage as needed. · Be safe with medicines. Take pain medicines exactly as directed. ¨ If the doctor gave you a prescription medicine for pain, take it as prescribed. ¨ If you are not taking a prescription pain medicine, ask your doctor if you can take an over-the-counter medicine. To prevent cellulitis in the future  · Try to prevent cuts, scrapes, or other injuries to your skin. Cellulitis most often occurs where there is a break in the skin. · If you get a scrape, cut, mild burn, or bite, wash the wound with clean water as soon as you can to help avoid infection. Don't use hydrogen peroxide or alcohol, which can slow healing. · If you have swelling in your legs (edema), support stockings and good skin care may help prevent leg sores and cellulitis. · Take care of your feet, especially if you have diabetes or other conditions that increase the risk of infection. Wear shoes and socks. Do not go barefoot. If you have athlete's foot or other skin problems on your feet, talk to your doctor about how to treat them. When should you call for help? Call your doctor now or seek immediate medical care if:  · You have signs that your infection is getting worse, such as:  ¨ Increased pain, swelling, warmth, or redness. ¨ Red streaks leading from the area. ¨ Pus draining from the area. ¨ A fever. · You get a rash. Watch closely for changes in your health, and be sure to contact your doctor if:  · You are not getting better after 1 day (24 hours). · You do not get better as expected. Where can you learn more? Go to http://shani-jasmin.info/. Kwadwo Barry in the search box to learn more about \"Cellulitis: Care Instructions. \"  Current as of: February 5, 2016  Content Version: 11.1  © 0728-4399 "[employer].  Care instructions adapted under license by [employer] [facility] [employer] (which disclaims liability or warranty for this information). If you have questions about a medical condition or this instruction, always ask your healthcare professional. [address] any warranty or liability for your use of this information. Urinary Tract Infection in Women: Care Instructions  Your Care Instructions    A urinary tract infection, or UTI, is a general term for an infection anywhere between the kidneys and the urethra (where urine comes out). Most UTIs are bladder infections. They often cause pain or burning when you urinate. UTIs are caused by bacteria and can be cured with antibiotics. Be sure to complete your treatment so that the infection goes away. Follow-up care is a key part of your treatment and safety. Be sure to make and go to all appointments, and call your doctor if you are having problems. It's also a good idea to know your test results and keep a list of the medicines you take. How can you care for yourself at home? · Take your antibiotics as directed. Do not stop taking them just because you feel better. You need to take the full course of antibiotics. · Drink extra water and other fluids for the next day or two. This may help wash out the bacteria that are causing the infection. (If you have kidney, heart, or liver disease and have to limit fluids, talk with your doctor before you increase your fluid intake.)  · Avoid drinks that are carbonated or have caffeine. They can irritate the bladder. · Urinate often. Try to empty your bladder each time. · To relieve pain, take a hot bath or lay a heating pad set on low over your lower belly or genital area. Never go to sleep with a heating pad in place. To prevent UTIs  · Drink plenty of water each day. This helps you urinate often, which clears bacteria from your system.  (If you have kidney, heart, or liver disease and have to limit fluids, talk with your doctor before you increase your fluid intake.)  · Consider adding cranberry juice to your diet.  · Urinate when you need to. · Urinate right after you have sex. · Change sanitary pads often. · Avoid douches, bubble baths, feminine hygiene sprays, and other feminine hygiene products that have deodorants. · After going to the bathroom, wipe from front to back. When should you call for help? Call your doctor now or seek immediate medical care if:  · Symptoms such as fever, chills, nausea, or vomiting get worse or appear for the first time. · You have new pain in your back just below your rib cage. This is called flank pain. · There is new blood or pus in your urine. · You have any problems with your antibiotic medicine. Watch closely for changes in your health, and be sure to contact your doctor if:  · You are not getting better after taking an antibiotic for 2 days. · Your symptoms go away but then come back. Where can you learn more? Go to http://shani-jasmin.info/. Enter U207 in the search box to learn more about \"Urinary Tract Infection in Women: Care Instructions. \"  Current as of: August 12, 2016  Content Version: 11.1  © 9094-4750 [employer]. Care instructions adapted under license by [employer] (which disclaims liability or warranty for this information). If you have questions about a medical condition or this instruction, always ask your healthcare professional. [address] any warranty or liability for your use of this information.

## 2017-03-12 NOTE — ED NOTES
Assumed care of pt from Susan Faulkner RN at bedside with verbal report consisting of Situation, Background, Assessment, and Recommendations (SBAR). Pt is A&O x 4. Pt resting comfortably on the stretcher in a position of comfort. Call bell within reach. Side rails x 2. Stretcher locked in the lowest position. Concerns and questions addressed at this time. Pt in no acute distress at this the time. Will continue to monitor.

## 2017-03-12 NOTE — ED TRIAGE NOTES
Assumed care of patient from EMS. Patient reports walking to the bathroom 30 minutes PTA and having a near syncopal episode. No chest pain, but reports shortness of breath and wheezing. Patient reports that this has happened before a few months back. Patient also reports left leg pain and redness since waking up this morning. Reports having trouble walking on the left leg. Patient also reports being diagnosed by Dr. Gagandeep Mchugh with kidney stones on the left and right side, and is reporting flank pain today. Patient was diagnosed with the stones a month ago. Reports MD trying to take the stone out, but the patient has a dental abscess currently. Patient is actively getting treatment for ovarian cancer, stage 3. Patient reports blood in the urine since having a blood transfusion. Reports being transfused on Friday at the infusion center. Patient is connected to the monitor x3, call bell in hand. Side rails up.

## 2017-03-12 NOTE — ED PROVIDER NOTES
HPI Comments: Vamsi Tapia is a 55 y.o. female with pertinent PMHx of HTN, asthma, GERD, obesity, fibromyalgia, anemia, CKD, and ovarian CA currently being treated with chemo every 2 weeks presenting via EMS to ED c/o increased SOB, flank pain, and L leg pain x 2 days. Pt states that her SOB has been increasing the last two days despite using her albuterol inhaler at home. Pt denies current SOB, but states that she experiences it with increased activity. Pt states that since her chemo treatment last Thursday, she has been experiencing increased fatigue. She also received a blood transfusion on Friday for \"low blood counts\" so she is unsure if this is also related to her fatigue. Of note, pt also states that she recently finished a course of levaquin for a UTI, but reports continued dysuria and hematuria. Additionally, pt has been diagnosed with bilateral nephrolithiasis and has a 1.2cm kidney stone that she needs to have a stent placed for. Pt has been following-up with Dr. Estephanie Weston, but due to a \"tooth infection\" he has not been able to perform the surgery. Pt is not currently taking any ABX for her tooth infection. Additionally, pt is also reporting LLE swelling and redness since yesterday. Pt states she has been ambulatory, but her pain has been gradually increasing since onset. Pt denies associated symptoms of fever, chills, wheezing, abdominal pain, diarrhea, constipation, weakness, dizziness, syncope, falls, cough. PCP: Cory Lau MD  Social Hx: Tobacco (-), alcohol use (-), illicit drug use (-)    PMH: per HPI  Surgical Hx: C/s x 2, hysterectomy, ROULA/BSO, R chest port palcement    There are no other complaints, changes, or physical findings at this time. The history is provided by the patient. Past Medical History:   Diagnosis Date    Anemia     Arthritis     DDD low back and knees    Asthma     uses albuterol prn only; none x 6 mos.   Never hospitalized    BRCA negative 1/2013  Calculus of kidney     Chronic kidney disease     kidney stones    Chronic pain     knee pain bilat    CINV (chemotherapy-induced nausea and vomiting) 2014    Diabetes ([facility] Age 46    IDDM    Environmental allergies     Fibromyalgia     GERD (gastroesophageal reflux disease)     controlled with med    Hypertension     Ill-defined condition     Sepsis     Morbid obesity ([facility]     Other and unspecified hyperlipidemia     Ovarian cancer ([facility] 2012, 2014    serous, high grade, stage IIIc. s/p chemotx (has port)    Rectal bleeding     Thromboembolus ([facility]     POST PARTUM; resolved- PELVIS    Thyroid disease     HYPOTHYROID    Unspecified sleep apnea     CPAP, Dr. Colonel Willis       Past Surgical History:   Procedure Laterality Date    BREAST SURGERY PROCEDURE UNLISTED      Lymph node in left breast 2017    CHEST SURGERY PROCEDURE UNLISTED      Placement of port a cath right chest    HX GYN       X2    HX HYSTERECTOMY  2012    ROULA/BSO, tumor debulking, omentectomy for ovarian cancer    HX ORTHOPAEDIC      ankle-FX, R    HX ORTHOPAEDIC      FX R ARM    HX VASCULAR ACCESS  2015    right chest- port placed         Family History:   Problem Relation Age of Onset    Hypertension Mother     Arthritis-osteo Mother     Lung Disease Father      COPD    Hypertension Father     Arthritis-osteo Father     Hypertension Brother     Elevated Lipids Brother     Arthritis-osteo Brother     Hypertension Brother     Elevated Lipids Brother     Obesity Brother     Cancer Brother      PROSTATE    Anesth Problems Son      DELAYED AWAKENING    Diabetes Maternal Grandmother        Social History     Social History    Marital status:      Spouse name: N/A    Number of children: N/A    Years of education: N/A     Occupational History    Not on file.      Social History Main Topics    Smoking status: Never Smoker    Smokeless tobacco: Never Used    Alcohol use No  Drug use: No    Sexual activity: Yes     Other Topics Concern    Not on file     Social History Narrative    Lives in [facility] alone.  passed away in 5/16 of a heart attack. Has 2 sons. Disability. 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 Enjoys swimming and going to the gym. ALLERGIES: Carboplatin; Iodinated contrast media - oral and iv dye; Lipitor [atorvastatin]; Percocet [oxycodone-acetaminophen]; and Shellfish containing products    Review of Systems   Constitutional: Positive for fatigue. Negative for activity change, appetite change, chills, diaphoresis, fever and unexpected weight change. HENT: Positive for dental problem (\"tooth infection\"). Negative for congestion, hearing loss, rhinorrhea, sinus pressure, sneezing, sore throat and trouble swallowing. Eyes: Negative for pain, redness, itching and visual disturbance. Respiratory: Positive for shortness of breath (with activity). Negative for cough and wheezing. Cardiovascular: Negative for chest pain, palpitations and leg swelling. Gastrointestinal: Negative for abdominal pain, constipation, diarrhea, nausea and vomiting. Genitourinary: Positive for dysuria, flank pain (L-sided) and hematuria. Musculoskeletal: Positive for joint swelling and myalgias (LLE). Negative for arthralgias and gait problem. Skin: Negative for color change, pallor, rash and wound. Neurological: Negative for tremors, weakness, light-headedness, numbness and headaches. All other systems reviewed and are negative. Vitals:    03/12/17 1500 03/12/17 1604 03/12/17 1815 03/12/17 1949   BP: 137/69 118/73 121/72 (!) 114/97   Pulse: 75 66 62 68   Resp: 24 20 23 19   Temp:       SpO2: 96% 98% 99% 97%   Weight:       Height:                Physical Exam   Constitutional: She is oriented to person, place, and time. Vital signs are normal. She appears well-developed and well-nourished. No distress.    50yo Female in NAD  Communicates appropriately and in full sentences     HENT:   Head: Normocephalic and atraumatic. Right Ear: External ear normal.   Left Ear: External ear normal.   Mouth/Throat: Uvula is midline and oropharynx is clear and moist. She does not have dentures. No oral lesions. No trismus in the jaw. Abnormal dentition. Dental caries present. No dental abscesses, uvula swelling or lacerations. No oropharyngeal exudate. No facial erythema, edema, or fluctuance. tooth # 4 with dental caries, no surrounding erythema, edema, or fluctuance. No trismus. No evidence of fractured tooth. Eyes: Conjunctivae are normal. Pupils are equal, round, and reactive to light. Right eye exhibits no discharge. Left eye exhibits no discharge. No scleral icterus. Neck: Normal range of motion. Neck supple. No tracheal deviation present. Cardiovascular: Normal rate, regular rhythm, normal heart sounds and intact distal pulses. Exam reveals no gallop and no friction rub. No murmur heard. Pulmonary/Chest: Effort normal and breath sounds normal. No accessory muscle usage or stridor. No respiratory distress. She has no decreased breath sounds. She has no wheezes. She has no rhonchi. She has no rales. She exhibits no tenderness. Comfortable at rest without tachypnea, accessory muscle use, or obvious chest wall abnormalities. Lungs CTA B/L without adventitious sounds. Communicates in full sentences   Abdominal: Soft. Bowel sounds are normal. She exhibits no distension. There is no tenderness. Elevated BMI  No pulsatile mass   No abdominal scars  Normoactive bowel sounds x 4  no tenderness  with light and deep palpation  no grimacing throughout entirety of abdominal exam  No CVA tenderness B/L   Musculoskeletal: Normal range of motion. She exhibits no edema, tenderness or deformity. Left lower leg: She exhibits swelling. She exhibits no tenderness, no bony tenderness, no edema, no deformity and no laceration. Legs:  No neurologic, motor, vascular, or compartment embarrassment observed on exam. No focal neurologic deficits. LLE with semi-circumferential erythema without atrophic changes  Neg Jusuts's sign bilaterally   Lymphadenopathy:     She has no cervical adenopathy. Neurological: She is alert and oriented to person, place, and time. No cranial nerve deficit. Coordination normal.   No focal neuro deficits. NVI. Neurologically intact of UE and LE B/L  Sensation intact and symmetrical of UE and LE B/L. Strength 5/5 of UE B/L, Strength 5/5 of LE B/L. Symmetric bulk and tone of LE muscle groups. Skin: Skin is warm and dry. No rash noted. She is not diaphoretic. There is erythema. No pallor. Semi-circumferential erythema located directly over shin in LLE with increased warmth  No atrophic changes, ulceration, or wounds   Psychiatric: She has a normal mood and affect. Nursing note and vitals reviewed. MDM  Number of Diagnoses or Management Options  Acute cystitis with hematuria:   Cellulitis of left lower extremity:   Dentalgia:   Histor of ovarian cancer:   Diagnosis management comments: DDx: dentalgia, nephrolithiasis, PE, DVT, CKD, electrolyte abNL, asthma exacerbation, cystitis, low suspicion ACS           Amount and/or Complexity of Data Reviewed  Clinical lab tests: ordered and reviewed  Tests in the radiology section of CPT®: reviewed and ordered  Tests in the medicine section of CPT®: ordered and reviewed  Discuss the patient with other providers: yes (Attending)  Independent visualization of images, tracings, or specimens: yes      ED Course       Procedures    I reviewed our electronic medical record system for any past medical records that were available that may contribute to the patients current condition, the nursing notes and and vital signs from today's visit     Nursing notes will be reviewed as they become available in realtime while the pt is in the ED.     Progress Note:  4:32 PM  Discussed case with Dr. Kevin Winslow. D-dimer will be elevated due to her CA diagnosis. If concerned, will order VQ scan. Vitals stable and low suspicion for PE due to SpO2 98% and HR 66. Will continue to monitor. Progress Note:  5:02 PM  Discussed my low suspicion for PE with patient based on her stable vital signs in ED [facility] ED. Pt expresses concern since she has had a PE in the past. Pt continues to deny current SOB and declines breathing treatment. Have ordered VQ scan for patient and updated her on her most UTD results available. Will continue to monitor. Progress Note:  5:11 PM  Spoke with radiologist who would prefer that the patient have a CTA per PE protocol since her GFR > 31. Will discontinue VQ scan. Will continue to monitor. 6:14 PM  Pt medicated for CT. CT notified. Progress Note:  7:16 PM  Informed pt on the most up-to-date available results including negative US and CT. Pt states she is feeling much better. Awaiting UA results. Will continue to monitor. DISCHARGE NOTE:  7:30 PM  Cathleen Blake's  results have been reviewed with her. She has been counseled regarding her diagnosis. She verbally conveys understanding and agreement of the signs, symptoms, diagnosis, treatment and prognosis and additionally agrees to follow up as recommended with Dr. Migel Rojas MD in 24 - 48 hours. She also agrees with the care-plan and conveys that all of her questions have been answered. I have also put together some discharge instructions for her that include: 1) educational information regarding their diagnosis, 2) how to care for their diagnosis at home, as well a 3) list of reasons why they would want to return to the ED prior to their follow-up appointment, should their condition change. She and/or family's questions have been answered. I have encouraged them to see the official results in [facility]\" or to retrieve the specifics of their results from medical records.        LABS COMPLETED AND REVIEWED:  Recent Results (from the past 12 hour(s))   EKG, 12 LEAD, INITIAL    Collection Time: 03/12/17  3:08 PM   Result Value Ref Range    Ventricular Rate 73 BPM    Atrial Rate 73 BPM    P-R Interval 140 ms    QRS Duration 78 ms    Q-T Interval 380 ms    QTC Calculation (Bezet) 418 ms    Calculated P Axis 72 degrees    Calculated R Axis 3 degrees    Calculated T Axis 18 degrees    Diagnosis       Normal sinus rhythm  Normal ECG  When compared with ECG of 13-FEB-2017 16:28,  No significant change was found     CBC WITH AUTOMATED DIFF    Collection Time: 03/12/17  3:54 PM   Result Value Ref Range    WBC 7.4 3.6 - 11.0 K/uL    RBC 2.96 (L) 3.80 - 5.20 M/uL    HGB 8.8 (L) 11.5 - 16.0 g/dL    HCT 26.7 (L) 35.0 - 47.0 %    MCV 90.2 80.0 - 99.0 FL    MCH 29.7 26.0 - 34.0 PG    MCHC 33.0 30.0 - 36.5 g/dL    RDW 14.7 (H) 11.5 - 14.5 %    PLATELET 886 (L) 966 - 400 K/uL    NEUTROPHILS 84 (H) 32 - 75 %    LYMPHOCYTES 15 12 - 49 %    MONOCYTES 0 (L) 5 - 13 %    EOSINOPHILS 1 0 - 7 %    BASOPHILS 0 0 - 1 %    ABS. NEUTROPHILS 6.1 1.8 - 8.0 K/UL    ABS. LYMPHOCYTES 1.1 0.8 - 3.5 K/UL    ABS. MONOCYTES 0.0 0.0 - 1.0 K/UL    ABS. EOSINOPHILS 0.1 0.0 - 0.4 K/UL    ABS.  BASOPHILS 0.0 0.0 - 0.1 K/UL   LACTIC ACID, PLASMA    Collection Time: 03/12/17  3:54 PM   Result Value Ref Range    Lactic acid 1.2 0.4 - 2.0 MMOL/L   TROPONIN I    Collection Time: 03/12/17  3:54 PM   Result Value Ref Range    Troponin-I, Qt. <0.04 <0.05 ng/mL   CK W/ REFLX CKMB    Collection Time: 03/12/17  3:54 PM   Result Value Ref Range     (H) 26 - 051 U/L   METABOLIC PANEL, COMPREHENSIVE    Collection Time: 03/12/17  3:54 PM   Result Value Ref Range    Sodium 140 136 - 145 mmol/L    Potassium 4.6 3.5 - 5.1 mmol/L    Chloride 110 (H) 97 - 108 mmol/L    CO2 21 21 - 32 mmol/L    Anion gap 9 5 - 15 mmol/L    Glucose 134 (H) 65 - 100 mg/dL    BUN 21 (H) 6 - 20 MG/DL    Creatinine 1.42 (H) 0.55 - 1.02 MG/DL    BUN/Creatinine ratio 15 12 - 20      GFR est AA 47 (L) >60 ml/min/1.73m2    GFR est non-AA 38 (L) >60 ml/min/1.73m2    Calcium 8.4 (L) 8.5 - 10.1 MG/DL    Bilirubin, total 0.6 0.2 - 1.0 MG/DL    ALT (SGPT) 23 12 - 78 U/L    AST (SGOT) 34 15 - 37 U/L    Alk. phosphatase 79 45 - 117 U/L    Protein, total 8.4 (H) 6.4 - 8.2 g/dL    Albumin 2.8 (L) 3.5 - 5.0 g/dL    Globulin 5.6 (H) 2.0 - 4.0 g/dL    A-G Ratio 0.5 (L) 1.1 - 2.2     CK-MB,QUANT. Collection Time: 03/12/17  3:54 PM   Result Value Ref Range    CK - MB <1.0 <3.6 NG/ML    CK-MB Index Cannot be calulated 0 - 2.5     URINALYSIS W/ REFLEX CULTURE    Collection Time: 03/12/17  7:04 PM   Result Value Ref Range    Color DARK YELLOW      Appearance TURBID (A) CLEAR      Specific gravity 1.025 1.003 - 1.030      pH (UA) 5.5 5.0 - 8.0      Protein 100 (A) NEG mg/dL    Glucose NEGATIVE  NEG mg/dL    Ketone NEGATIVE  NEG mg/dL    Blood LARGE (A) NEG      Urobilinogen 0.2 0.2 - 1.0 EU/dL    Nitrites NEGATIVE  NEG      Leukocyte Esterase MODERATE (A) NEG      WBC >100 (H) 0 - 4 /hpf    RBC >100 (H) 0 - 5 /hpf    Epithelial cells MODERATE (A) FEW /lpf    Bacteria NEGATIVE  NEG /hpf    UA:UC IF INDICATED URINE CULTURE ORDERED (A) CNI     BILIRUBIN, CONFIRM    Collection Time: 03/12/17  7:04 PM   Result Value Ref Range    Bilirubin UA, confirm NEGATIVE  NEG         IMAGING COMPLETED AND REVIEWED:  INDICATION: Left leg pain and swelling, assess for DVT.     COMPARISON: 1/16/2015.     FINDINGS: Duplex venous Doppler examination was performed from the left  inguinal ligament to the proximal calf. Deep venous structures were well imaged and appeared compressible throughout. Both wave form and color flow analysis demonstrated normal flow patterns. Augmentation was demonstrable.         IMPRESSION  IMPRESSION:  No deep venous thrombosis detected. CT Results  (Last 48 hours)               03/12/17 1840  CTA CHEST W WO CONT Final result    Impression:  IMPRESSION:    No pulmonary embolism.        No aortic aneurysm or dissection. Mild interval progression of mediastinal lymphadenopathy compared to the study   of 1/5/2016       Narrative:  CLINICAL HISTORY: Dyspnea on exertion       INDICATION: Dyspnea, exertional       COMPARISON: 1/5/2016       TECHNIQUE: CT of the chest with  IV contrast , Isovue-370 is performed. Axial   images from the thoracic inlet to the level of the upper abdomen are obtained. Manual post-processing of the images and coronal reformatting is also performed. CT dose reduction was achieved through use of a standardized protocol tailored   for this examination and automatic exposure control for dose modulation. Multiplanar reformatted imaging was performed. Sagittal and coronal reformatting. 3-D Postprocessing of imaging was performed. 3-D MIP reconstructed images were obtained in the coronal plane. FINDINGS:        There is no proximal pulmonary embolism. Or vessel aortic arch with left   vertebral artery arising directly from the aortic arch. There is mediastinal lymphadenopathy which is progressed since the prior exam.   There is no aortic aneurysm or dissection. No acute intrathoracic process is identified. There is no pleural or pericardial effusion. The aorta is normal in course and   caliber. The proximal pulmonary arteries are without evidence of filling   defects, the caliber of the pulmonary arteries is also normal. The pulmonary   parenchyma is unremarkable. No lytic or blastic lesions are identified. The   remainder of the upper abdomen visualized is unremarkable. Degenerative change   in the midthoracic spine with spinal canal stenosis in the midthoracic spine   suspected. CLINICAL IMPRESSION:  1. Cellulitis of left lower extremity    2. Histor of ovarian cancer    3. Acute cystitis with hematuria    4. Dentalgia        Plan:  1. Return precautions  2. Medications as prescribed  3.  Follow-ups as discussed    Discharge Medication List as of 3/12/2017  7:32 PM      START taking these medications    Details   cephALEXin (KEFLEX) 500 mg capsule Take 1 Cap by mouth four (4) times daily for 7 days. , Normal, Disp-28 Cap, R-0      doxycycline (VIBRA-TABS) 100 mg tablet Take 1 Tab by mouth two (2) times a day for 7 days. , Normal, Disp-14 Tab, R-0         CONTINUE these medications which have CHANGED    Details   HYDROcodone-acetaminophen (NORCO) 5-325 mg per tablet Take 1 Tab by mouth every four (4) hours as needed for Pain. Max Daily Amount: 6 Tabs., Print, Disp-20 Tab, R-0         CONTINUE these medications which have NOT CHANGED    Details   levoFLOXacin (LEVAQUIN) 250 mg tablet Take 1 Tab by mouth daily. , Normal, Disp-10 Tab, R-0      sertraline (ZOLOFT) 50 mg tablet TAKE 1 TABLET BY MOUTH EVERY MORNING, Historical Med, R-1      clonazePAM (KLONOPIN) 1 mg tablet TAKE 1 TABLET BY MOUTH TWICE A DAY, Historical Med, R-1      methocarbamol (ROBAXIN) 500 mg tablet Take  by mouth three (3) times daily. , Historical Med      ondansetron (ZOFRAN ODT) 8 mg disintegrating tablet Take 1 Tab by mouth every eight (8) hours as needed for Nausea., Normal, Disp-30 Tab, R-1      valsartan (DIOVAN) 160 mg tablet TAKE 1 TAB BY MOUTH DAILY. REPLACES VALSARTAN-HCTZ, Normal, Disp-30 Tab, R-11      LEVEMIR FLEXTOUCH 100 unit/mL (3 mL) inpn 30 UNITS IN THE MORNING AND 50 UNITS AT NIGHT. INCREASE AS DIRECTED TO MAX  UNITS PER DAY, Normal, Disp-30 mL, R-11      omega-3 acid ethyl esters (LOVAZA) 1 gram capsule TAKE ONE CAPSULE BY MOUTH TWICE A DAY, Normal, Disp-180 Cap, R-3      montelukast (SINGULAIR) 10 mg tablet Take 1 Tab by mouth daily. , Normal, Disp-90 Tab, R-2      levothyroxine (SYNTHROID) 150 mcg tablet TAKE 1 TAB BY MOUTH DAILY (BEFORE BREAKFAST). , Normal, Disp-90 Tab, R-3      Lancets misc Use as directed to test blood sugar three times daily-DX-DM-250.00, Normal, Disp-270 Each, R-3      fluticasone-salmeterol (ADVAIR HFA) 308-68 mcg/actuation inhaler Take 2 Puffs by inhalation two (2) times a day., Normal, Disp-12 g, R-5      BD INSULIN PEN NEEDLE UF SHORT 31 gauge x 5/16\" ndle USE WITH INSULIN TO INJECT FIVE TIMES DAILY., Normal, Disp-200 Pen Needle, R-11      albuterol (PROVENTIL VENTOLIN) 2.5 mg /3 mL (0.083 %) nebulizer solution 3 mL by Nebulization route every four (4) hours as needed for Wheezing., Normal, Disp-24 Each, R-11      Liraglutide (VICTOZA) 0.6 mg/0.1 mL (18 mg/3 mL) sub-q pen 0.3 mL by SubCUTAneous route daily (with lunch). , Normal, Disp-3 Each, R-11      NOVOLOG FLEXPEN 100 unit/mL inpn INJECT 20 UNITS BEFORE EACH MEAL + 4 UNITS FOR EVERY 50 MG/DL ABOVE 150 MG/DL.  UNITS PER DAY, Normal, Disp-30 mL, R-11      LORazepam (ATIVAN) 1 mg tablet Take 1 Tab by mouth every six (6) hours as needed for Anxiety. Max Daily Amount: 4 mg. Indications: CANCER CHEMOTHERAPY-INDUCED NAUSEA AND VOMITING, Print, Disp-40 Tab, R-1      potassium chloride SR (KLOR-CON 10) 10 mEq tablet Take 1 Tab by mouth two (2) days a week., Normal, Disp-30 Tab, R-1      cholecalciferol, VITAMIN D3, (VITAMIN D3) 5,000 unit tab tablet Take  by mouth daily. , Historical Med      furosemide (LASIX) 40 mg tablet Take 80 mg by mouth as needed., Historical Med, R-3      Blood-Glucose Meter monitoring kit Use as directed to test blood sugar three times daily-DX-DM-250.00, Normal, Disp-1 Kit, R-0      albuterol (PROVENTIL HFA, VENTOLIN HFA, PROAIR HFA) 90 mcg/actuation inhaler Take 2 Puffs by inhalation every four (4) hours as needed for Wheezing., Historical Med      Bifidobacterium Infantis (ALIGN) 4 mg cap Take 1 Cap by mouth daily. , Historical Med      fexofenadine (ALLEGRA) 180 mg tablet Take 180 mg by mouth daily.   , Historical Med      mometasone (NASONEX) 50 mcg/actuation nasal spray 2 sprays by Both Nostrils route as needed., Historical Med           Follow-up Information     Follow up With Details Comments Contact Info    Prateek Hall MD Schedule an appointment as soon as possible for a visit in 2 days As needed, If symptoms worsen, Possible further evaluation and treatment [address] [facility]  [address]  [facility]  [phone]      [facility] EMERGENCY DEPT Go to As needed, If symptoms worsen [address] Denver Hernandez MD Schedule an appointment as soon as possible for a visit in 2 days As needed, If symptoms worsen, Possible further evaluation and treatment [address]  [phone]      Your Oral Surgeon Schedule an appointment as soon as possible for a visit in 2 days As needed, If symptoms worsen, Possible further evaluation and treatment         Return to the closest emergency room or follow up sooner for any deterioration               This note will not be viewable in [address]

## 2017-03-13 ENCOUNTER — TELEPHONE (OUTPATIENT)
Dept: GYNECOLOGY | Age: 57
End: 2017-03-13

## 2017-03-13 LAB
ATRIAL RATE: 73 BPM
CALCULATED P AXIS, ECG09: 72 DEGREES
CALCULATED R AXIS, ECG10: 3 DEGREES
CALCULATED T AXIS, ECG11: 18 DEGREES
DIAGNOSIS, 93000: NORMAL
P-R INTERVAL, ECG05: 140 MS
Q-T INTERVAL, ECG07: 380 MS
QRS DURATION, ECG06: 78 MS
QTC CALCULATION (BEZET), ECG08: 418 MS
VENTRICULAR RATE, ECG03: 73 BPM

## 2017-03-13 NOTE — TELEPHONE ENCOUNTER
Pt calling to inform office she was seen in ED over we with leg pain. DVT was ruled out. Pt was instructed to make an appt with Dr. Ernestina Jara within 2 days, and she is going to do this. Per Dr. Jareth Webster we will hold her chemo until tooth issue and kidney issue resolved.

## 2017-03-13 NOTE — TELEPHONE ENCOUNTER
I called pt, she was advised to make sure she has a follow up from the ER with Dr. Corie Gipson, and he needs to also agree to the tooth extraction, once she has release from Dr. Corie Gipson to have extraction, make the appt with Dr. Teena Sullivan and then call us back. She was advised she will need lab work 2 days before extraction to check plts. She will also need to delay her chemo 1-2 weeks per Dr. Alena Castle.   She will follow up with Dr. Corie Gipson and then call us back when and if she scheduled the tooth extraction with Dr. Teena Sullivan, pt verbalized understanding

## 2017-03-13 NOTE — ED NOTES
MALLIKA Soto gave and reviewed discharge instructions with the patient. The patient verbalized understanding. The patient was given opportunity for questions. Patient discharged in stable condition to the waiting room via wheelchair with male visitor.

## 2017-03-14 LAB
BACTERIA SPEC CULT: NORMAL
CC UR VC: NORMAL
SERVICE CMNT-IMP: NORMAL

## 2017-03-16 ENCOUNTER — HOSPITAL ENCOUNTER (OUTPATIENT)
Dept: PREADMISSION TESTING | Age: 57
Discharge: HOME | End: 2017-03-16
Attending: UROLOGY
Payer: MEDICARE

## 2017-03-16 VITALS
SYSTOLIC BLOOD PRESSURE: 142 MMHG | OXYGEN SATURATION: 95 % | TEMPERATURE: 97.6 F | BODY MASS INDEX: 43.4 KG/M2 | RESPIRATION RATE: 20 BRPM | DIASTOLIC BLOOD PRESSURE: 71 MMHG | WEIGHT: 293 LBS | HEIGHT: 69 IN | HEART RATE: 81 BPM

## 2017-03-16 LAB
ALBUMIN SERPL BCP-MCNC: 2.9 G/DL (ref 3.5–5)
ALBUMIN/GLOB SERPL: 0.5 {RATIO} (ref 1.1–2.2)
ALP SERPL-CCNC: 88 U/L (ref 45–117)
ALT SERPL-CCNC: 23 U/L (ref 12–78)
ANION GAP BLD CALC-SCNC: 11 MMOL/L (ref 5–15)
APPEARANCE UR: ABNORMAL
AST SERPL W P-5'-P-CCNC: 30 U/L (ref 15–37)
BACTERIA URNS QL MICRO: ABNORMAL /HPF
BILIRUB SERPL-MCNC: 0.4 MG/DL (ref 0.2–1)
BILIRUB UR QL: NEGATIVE
BUN SERPL-MCNC: 22 MG/DL (ref 6–20)
BUN/CREAT SERPL: 16 (ref 12–20)
CALCIUM SERPL-MCNC: 8.9 MG/DL (ref 8.5–10.1)
CHLORIDE SERPL-SCNC: 110 MMOL/L (ref 97–108)
CO2 SERPL-SCNC: 22 MMOL/L (ref 21–32)
COLOR UR: ABNORMAL
CREAT SERPL-MCNC: 1.35 MG/DL (ref 0.55–1.02)
EPITH CASTS URNS QL MICRO: ABNORMAL /LPF
ERYTHROCYTE [DISTWIDTH] IN BLOOD BY AUTOMATED COUNT: 14.4 % (ref 11.5–14.5)
GLOBULIN SER CALC-MCNC: 5.6 G/DL (ref 2–4)
GLUCOSE SERPL-MCNC: 140 MG/DL (ref 65–100)
GLUCOSE UR STRIP.AUTO-MCNC: NEGATIVE MG/DL
HCT VFR BLD AUTO: 28.4 % (ref 35–47)
HGB BLD-MCNC: 9 G/DL (ref 11.5–16)
HGB UR QL STRIP: ABNORMAL
KETONES UR QL STRIP.AUTO: NEGATIVE MG/DL
LEUKOCYTE ESTERASE UR QL STRIP.AUTO: ABNORMAL
MCH RBC QN AUTO: 28.4 PG (ref 26–34)
MCHC RBC AUTO-ENTMCNC: 31.7 G/DL (ref 30–36.5)
MCV RBC AUTO: 89.6 FL (ref 80–99)
NITRITE UR QL STRIP.AUTO: NEGATIVE
OTHER,OTHU: ABNORMAL
PH UR STRIP: 5.5 [PH] (ref 5–8)
PLATELET # BLD AUTO: 90 K/UL (ref 150–400)
POTASSIUM SERPL-SCNC: 4 MMOL/L (ref 3.5–5.1)
PROT SERPL-MCNC: 8.5 G/DL (ref 6.4–8.2)
PROT UR STRIP-MCNC: 100 MG/DL
RBC # BLD AUTO: 3.17 M/UL (ref 3.8–5.2)
RBC #/AREA URNS HPF: >100 /HPF (ref 0–5)
SODIUM SERPL-SCNC: 143 MMOL/L (ref 136–145)
SP GR UR REFRACTOMETRY: 1.02 (ref 1–1.03)
UROBILINOGEN UR QL STRIP.AUTO: 0.2 EU/DL (ref 0.2–1)
WBC # BLD AUTO: 5.4 K/UL (ref 3.6–11)
WBC URNS QL MICRO: >100 /HPF (ref 0–4)
YEAST URNS QL MICRO: PRESENT

## 2017-03-16 PROCEDURE — 87086 URINE CULTURE/COLONY COUNT: CPT | Performed by: UROLOGY

## 2017-03-16 PROCEDURE — 85027 COMPLETE CBC AUTOMATED: CPT | Performed by: UROLOGY

## 2017-03-16 PROCEDURE — 80053 COMPREHEN METABOLIC PANEL: CPT | Performed by: UROLOGY

## 2017-03-16 PROCEDURE — 81001 URINALYSIS AUTO W/SCOPE: CPT | Performed by: UROLOGY

## 2017-03-16 PROCEDURE — 36415 COLL VENOUS BLD VENIPUNCTURE: CPT | Performed by: UROLOGY

## 2017-03-16 RX ORDER — SODIUM CHLORIDE, SODIUM LACTATE, POTASSIUM CHLORIDE, CALCIUM CHLORIDE 600; 310; 30; 20 MG/100ML; MG/100ML; MG/100ML; MG/100ML
25 INJECTION, SOLUTION INTRAVENOUS CONTINUOUS
Status: CANCELLED | OUTPATIENT
Start: 2017-03-20

## 2017-03-16 RX ORDER — BISMUTH SUBSALICYLATE 262 MG
2 TABLET,CHEWABLE ORAL
COMMUNITY
End: 2017-08-24

## 2017-03-16 NOTE — PERIOP NOTES
[facility]  Preoperative Instructions        Surgery Date 03/20/17          Time of Arrival 1100  Contact # [phone] cell    1. On the day of your surgery, please report to the Surgical Services Registration Desk and sign in at your designated time. The Surgery Center is located to the right of the Emergency Room. 2. You must have someone with you to drive you home. You should not drive a car for 24 hours following surgery. Please make arrangements for a friend or family member to stay with you for the first 24 hours after your surgery. 3. Do not have anything to eat or drink (including water, gum, mints, coffee, juice) after midnight      . This may not apply to medications prescribed by your physician. Please note special instructions, if applicable. If you are currently taking Plavix, Coumadin, or other blood-thinning agents, contact your surgeon for instructions. 4. We recommend you do not drink any alcoholic beverages for 24 hours before and after your surgery. 5. Have a list of all current medications, including vitamins, herbal supplements and any other over the counter medications. Stop all Aspirin and non-steroidal anti-inflammatory drugs (I.e. Advil, Aleve), as directed by your surgeon's office. Stop all vitamins and herbal supplements seven days prior to your surgery. 6. Wear comfortable clothes. Wear glasses instead of contacts. Do not bring any money or jewelry. Please bring picture ID, insurance card, and any prearranged co-payment or hospital payment. Do not wear make-up, particularly mascara the morning of your surgery. Do not wear nail polish, particularly if you are having foot /hand surgery. Wear your hair loose or down, no ponytails, buns, charles pins or clips. All body piercings must be removed.   Please shower with antibacterial soap for three consecutive days before and on the morning of surgery, but do not apply any lotions, powders or deodorants after the shower on the day of surgery. Please use a fresh towels after each shower. Please sleep in clean clothes and change bed linens the night before surgery. Please do not shave for 48 hours prior to surgery. Shaving of the face is acceptable. 7. You should understand that if you do not follow these instructions your surgery may be cancelled. If your physical condition changes (I.e. fever, cold or flu) please contact your surgeon as soon as possible. 8. It is important that you be on time. If a situation occurs where you may be late, please call [phone] (OR Holding Area). 9. If you have any questions and or problems, please call [phone] (Pre-admission Testing). 10. Your surgery time may be subject to change. You will receive a phone call the evening prior if your time changes. 11.  If having outpatient surgery, you must have someone to drive you here, stay with you during the duration of your stay, and to drive you home at time of discharge. Special Instructions: The nurse will call to clarify insulin instructions (levemir)---do not take your mealtime short acting insulins day of surgery    MEDICATIONS TO TAKE THE MORNING OF SURGERY WITH A SIP OF WATER:use inhaler if needed--please bring to the hospital---use nebulizer if needed,clonazepam,advair,pain medication as needed,thyroid,robaxin as needed,zoloft---nasal spray as needed      I understand a pre-operative phone call will be made to verify my surgery time. In the event that I am not available, I give permission for a message to be left on my answering service and/or with another person?   yes         ___________________      __________   _________    (Signature of Patient)             (Witness)                (Date and Time)

## 2017-03-17 ENCOUNTER — TELEPHONE (OUTPATIENT)
Dept: GYNECOLOGY | Age: 57
End: 2017-03-17

## 2017-03-17 NOTE — PERIOP NOTES
Verified with Dr. Sandrita Morse to take 1/2 dose levemir (15 units) morning of surgery. Left message with patient of instructions.

## 2017-03-18 LAB
BACTERIA SPEC CULT: ABNORMAL
CC UR VC: ABNORMAL
SERVICE CMNT-IMP: ABNORMAL

## 2017-03-20 ENCOUNTER — HOSPITAL ENCOUNTER (OUTPATIENT)
Age: 57
Setting detail: OUTPATIENT SURGERY
Discharge: HOME | End: 2017-03-20
Attending: UROLOGY | Admitting: UROLOGY
Payer: MEDICARE

## 2017-03-20 ENCOUNTER — APPOINTMENT (OUTPATIENT)
Dept: GENERAL RADIOLOGY | Age: 57
End: 2017-03-20
Attending: UROLOGY
Payer: MEDICARE

## 2017-03-20 ENCOUNTER — ANESTHESIA (OUTPATIENT)
Dept: SURGERY | Age: 57
End: 2017-03-20
Payer: MEDICARE

## 2017-03-20 ENCOUNTER — ANESTHESIA EVENT (OUTPATIENT)
Dept: SURGERY | Age: 57
End: 2017-03-20
Payer: MEDICARE

## 2017-03-20 VITALS
OXYGEN SATURATION: 95 % | DIASTOLIC BLOOD PRESSURE: 96 MMHG | TEMPERATURE: 97.9 F | WEIGHT: 293 LBS | RESPIRATION RATE: 18 BRPM | HEART RATE: 85 BPM | SYSTOLIC BLOOD PRESSURE: 151 MMHG | BODY MASS INDEX: 43.4 KG/M2 | HEIGHT: 69 IN

## 2017-03-20 LAB
ANION GAP BLD CALC-SCNC: 15 MMOL/L (ref 5–15)
BUN BLD-MCNC: 33 MG/DL (ref 9–20)
CA-I BLD-MCNC: 1.12 MMOL/L (ref 1.12–1.32)
CHLORIDE BLD-SCNC: 108 MMOL/L (ref 98–107)
CO2 BLD-SCNC: 24 MMOL/L (ref 21–32)
CREAT BLD-MCNC: 1.2 MG/DL (ref 0.6–1.3)
GLUCOSE BLD STRIP.AUTO-MCNC: 119 MG/DL (ref 65–100)
GLUCOSE BLD-MCNC: 118 MG/DL (ref 65–105)
HCT VFR BLD CALC: 31 % (ref 35–47)
HGB BLD-MCNC: 10.5 GM/DL (ref 11.5–16)
POTASSIUM BLD-SCNC: 5.5 MMOL/L (ref 3.5–5.1)
SERVICE CMNT-IMP: ABNORMAL
SERVICE CMNT-IMP: ABNORMAL
SODIUM BLD-SCNC: 141 MMOL/L (ref 136–145)

## 2017-03-20 PROCEDURE — 77030020782 HC GWN BAIR PAWS FLX 3M -B

## 2017-03-20 PROCEDURE — C1769 GUIDE WIRE: HCPCS | Performed by: UROLOGY

## 2017-03-20 PROCEDURE — 74011636320 HC RX REV CODE- 636/320

## 2017-03-20 PROCEDURE — 77030037194: Performed by: UROLOGY

## 2017-03-20 PROCEDURE — 77030018836 HC SOL IRR NACL ICUM -A: Performed by: UROLOGY

## 2017-03-20 PROCEDURE — 80047 BASIC METABLC PNL IONIZED CA: CPT

## 2017-03-20 PROCEDURE — 76210000020 HC REC RM PH II FIRST 0.5 HR: Performed by: UROLOGY

## 2017-03-20 PROCEDURE — 77030037191: Performed by: UROLOGY

## 2017-03-20 PROCEDURE — 74011250636 HC RX REV CODE- 250/636: Performed by: ANESTHESIOLOGY

## 2017-03-20 PROCEDURE — C2617 STENT, NON-COR, TEM W/O DEL: HCPCS | Performed by: UROLOGY

## 2017-03-20 PROCEDURE — 74011250636 HC RX REV CODE- 250/636

## 2017-03-20 PROCEDURE — 77030008684 HC TU ET CUF COVD -B: Performed by: ANESTHESIOLOGY

## 2017-03-20 PROCEDURE — 77030037192 HC FBR LSR HOLM 272 MIC DISP LENI -C: Performed by: UROLOGY

## 2017-03-20 PROCEDURE — 77030007851 HC IRR CYSTO/TUR BSC -B: Performed by: UROLOGY

## 2017-03-20 PROCEDURE — 77030018846 HC SOL IRR STRL H20 ICUM -A: Performed by: UROLOGY

## 2017-03-20 PROCEDURE — 77030018832 HC SOL IRR H20 ICUM -A: Performed by: UROLOGY

## 2017-03-20 PROCEDURE — 76010000161 HC OR TIME 1 TO 1.5 HR INTENSV-TIER 1: Performed by: UROLOGY

## 2017-03-20 PROCEDURE — 74420 UROGRAPHY RTRGR +-KUB: CPT

## 2017-03-20 PROCEDURE — 77030037193 HC FBR LSR HOLM 365 MIC DISP LENI -C: Performed by: UROLOGY

## 2017-03-20 PROCEDURE — 76210000016 HC OR PH I REC 1 TO 1.5 HR: Performed by: UROLOGY

## 2017-03-20 PROCEDURE — 77030032490 HC SLV COMPR SCD KNE COVD -B: Performed by: UROLOGY

## 2017-03-20 PROCEDURE — 74011000258 HC RX REV CODE- 258: Performed by: UROLOGY

## 2017-03-20 PROCEDURE — 74011250636 HC RX REV CODE- 250/636: Performed by: UROLOGY

## 2017-03-20 PROCEDURE — 77030021163 HC TUBE CYSTO IRR ICUM -A: Performed by: UROLOGY

## 2017-03-20 PROCEDURE — 74011636320 HC RX REV CODE- 636/320: Performed by: UROLOGY

## 2017-03-20 PROCEDURE — 76000 FLUOROSCOPY <1 HR PHYS/QHP: CPT

## 2017-03-20 PROCEDURE — 74011000250 HC RX REV CODE- 250

## 2017-03-20 PROCEDURE — 76060000033 HC ANESTHESIA 1 TO 1.5 HR: Performed by: UROLOGY

## 2017-03-20 PROCEDURE — 82962 GLUCOSE BLOOD TEST: CPT

## 2017-03-20 RX ORDER — CYCLOSPORINE 0.5 MG/ML
1 EMULSION OPHTHALMIC 2 TIMES DAILY
Status: ON HOLD | COMMUNITY
End: 2018-07-17

## 2017-03-20 RX ORDER — ONDANSETRON 2 MG/ML
INJECTION INTRAMUSCULAR; INTRAVENOUS AS NEEDED
Status: DISCONTINUED | OUTPATIENT
Start: 2017-03-20 | End: 2017-03-20 | Stop reason: HOSPADM

## 2017-03-20 RX ORDER — FENTANYL CITRATE 50 UG/ML
INJECTION, SOLUTION INTRAMUSCULAR; INTRAVENOUS AS NEEDED
Status: DISCONTINUED | OUTPATIENT
Start: 2017-03-20 | End: 2017-03-20 | Stop reason: HOSPADM

## 2017-03-20 RX ORDER — HYDROMORPHONE HYDROCHLORIDE 1 MG/ML
0.2 INJECTION, SOLUTION INTRAMUSCULAR; INTRAVENOUS; SUBCUTANEOUS
Status: DISCONTINUED | OUTPATIENT
Start: 2017-03-20 | End: 2017-03-20 | Stop reason: HOSPADM

## 2017-03-20 RX ORDER — DIPHENHYDRAMINE HYDROCHLORIDE 50 MG/ML
12.5 INJECTION, SOLUTION INTRAMUSCULAR; INTRAVENOUS AS NEEDED
Status: DISCONTINUED | OUTPATIENT
Start: 2017-03-20 | End: 2017-03-20 | Stop reason: HOSPADM

## 2017-03-20 RX ORDER — LIDOCAINE HYDROCHLORIDE 10 MG/ML
0.1 INJECTION, SOLUTION EPIDURAL; INFILTRATION; INTRACAUDAL; PERINEURAL AS NEEDED
Status: DISCONTINUED | OUTPATIENT
Start: 2017-03-20 | End: 2017-03-20 | Stop reason: HOSPADM

## 2017-03-20 RX ORDER — FLUCONAZOLE 150 MG/1
150 TABLET ORAL DAILY
Qty: 3 TAB | Refills: 0 | Status: SHIPPED | OUTPATIENT
Start: 2017-03-20 | End: 2017-03-23

## 2017-03-20 RX ORDER — PROPOFOL 10 MG/ML
INJECTION, EMULSION INTRAVENOUS AS NEEDED
Status: DISCONTINUED | OUTPATIENT
Start: 2017-03-20 | End: 2017-03-20 | Stop reason: HOSPADM

## 2017-03-20 RX ORDER — SODIUM CHLORIDE 0.9 % (FLUSH) 0.9 %
5-10 SYRINGE (ML) INJECTION AS NEEDED
Status: DISCONTINUED | OUTPATIENT
Start: 2017-03-20 | End: 2017-03-20 | Stop reason: HOSPADM

## 2017-03-20 RX ORDER — SODIUM CHLORIDE 0.9 % (FLUSH) 0.9 %
5-10 SYRINGE (ML) INJECTION EVERY 8 HOURS
Status: DISCONTINUED | OUTPATIENT
Start: 2017-03-20 | End: 2017-03-20 | Stop reason: HOSPADM

## 2017-03-20 RX ORDER — LIDOCAINE HYDROCHLORIDE 20 MG/ML
INJECTION, SOLUTION EPIDURAL; INFILTRATION; INTRACAUDAL; PERINEURAL AS NEEDED
Status: DISCONTINUED | OUTPATIENT
Start: 2017-03-20 | End: 2017-03-20 | Stop reason: HOSPADM

## 2017-03-20 RX ORDER — CISATRACURIUM BESYLATE 2 MG/ML
INJECTION, SOLUTION INTRAVENOUS AS NEEDED
Status: DISCONTINUED | OUTPATIENT
Start: 2017-03-20 | End: 2017-03-20 | Stop reason: HOSPADM

## 2017-03-20 RX ORDER — SUCCINYLCHOLINE CHLORIDE 20 MG/ML
INJECTION INTRAMUSCULAR; INTRAVENOUS AS NEEDED
Status: DISCONTINUED | OUTPATIENT
Start: 2017-03-20 | End: 2017-03-20 | Stop reason: HOSPADM

## 2017-03-20 RX ORDER — SODIUM CHLORIDE, SODIUM LACTATE, POTASSIUM CHLORIDE, CALCIUM CHLORIDE 600; 310; 30; 20 MG/100ML; MG/100ML; MG/100ML; MG/100ML
25 INJECTION, SOLUTION INTRAVENOUS CONTINUOUS
Status: DISCONTINUED | OUTPATIENT
Start: 2017-03-20 | End: 2017-03-20 | Stop reason: HOSPADM

## 2017-03-20 RX ORDER — FENTANYL CITRATE 50 UG/ML
25 INJECTION, SOLUTION INTRAMUSCULAR; INTRAVENOUS
Status: DISCONTINUED | OUTPATIENT
Start: 2017-03-20 | End: 2017-03-20 | Stop reason: HOSPADM

## 2017-03-20 RX ORDER — MIDAZOLAM HYDROCHLORIDE 1 MG/ML
INJECTION, SOLUTION INTRAMUSCULAR; INTRAVENOUS AS NEEDED
Status: DISCONTINUED | OUTPATIENT
Start: 2017-03-20 | End: 2017-03-20 | Stop reason: HOSPADM

## 2017-03-20 RX ADMIN — SODIUM CHLORIDE, POTASSIUM CHLORIDE, SODIUM LACTATE AND CALCIUM CHLORIDE: 600; 310; 30; 20 INJECTION, SOLUTION INTRAVENOUS at 13:50

## 2017-03-20 RX ADMIN — CEFAZOLIN 3 G: 1 INJECTION, POWDER, FOR SOLUTION INTRAMUSCULAR; INTRAVENOUS; PARENTERAL at 14:01

## 2017-03-20 RX ADMIN — MIDAZOLAM HYDROCHLORIDE 2 MG: 1 INJECTION, SOLUTION INTRAMUSCULAR; INTRAVENOUS at 13:53

## 2017-03-20 RX ADMIN — PROPOFOL 250 MG: 10 INJECTION, EMULSION INTRAVENOUS at 13:57

## 2017-03-20 RX ADMIN — FENTANYL CITRATE 25 MCG: 50 INJECTION, SOLUTION INTRAMUSCULAR; INTRAVENOUS at 14:27

## 2017-03-20 RX ADMIN — SODIUM CHLORIDE, SODIUM LACTATE, POTASSIUM CHLORIDE, AND CALCIUM CHLORIDE 25 ML/HR: 600; 310; 30; 20 INJECTION, SOLUTION INTRAVENOUS at 12:36

## 2017-03-20 RX ADMIN — FENTANYL CITRATE 25 MCG: 50 INJECTION, SOLUTION INTRAMUSCULAR; INTRAVENOUS at 14:40

## 2017-03-20 RX ADMIN — ONDANSETRON 4 MG: 2 INJECTION INTRAMUSCULAR; INTRAVENOUS at 14:48

## 2017-03-20 RX ADMIN — FENTANYL CITRATE 25 MCG: 50 INJECTION, SOLUTION INTRAMUSCULAR; INTRAVENOUS at 14:30

## 2017-03-20 RX ADMIN — FENTANYL CITRATE 125 MCG: 50 INJECTION, SOLUTION INTRAMUSCULAR; INTRAVENOUS at 13:56

## 2017-03-20 RX ADMIN — CISATRACURIUM BESYLATE 2 MG: 2 INJECTION, SOLUTION INTRAVENOUS at 13:56

## 2017-03-20 RX ADMIN — FENTANYL CITRATE 25 MCG: 50 INJECTION, SOLUTION INTRAMUSCULAR; INTRAVENOUS at 14:02

## 2017-03-20 RX ADMIN — FENTANYL CITRATE 25 MCG: 50 INJECTION, SOLUTION INTRAMUSCULAR; INTRAVENOUS at 14:18

## 2017-03-20 RX ADMIN — LIDOCAINE HYDROCHLORIDE 100 MG: 20 INJECTION, SOLUTION EPIDURAL; INFILTRATION; INTRACAUDAL; PERINEURAL at 13:56

## 2017-03-20 RX ADMIN — IOTHALAMATE MEGLUMINE 20 ML: 600 INJECTION INTRAVASCULAR at 14:00

## 2017-03-20 RX ADMIN — SUCCINYLCHOLINE CHLORIDE 180 MG: 20 INJECTION INTRAMUSCULAR; INTRAVENOUS at 13:58

## 2017-03-20 NOTE — PERIOP NOTES
Handoff Report from Operating Room to PACU    Report received from Pushpa Bautista RN and JUNE Tejada CRNA regarding Boni Spatz. Surgeon(s):  Kristopher Duffy MD  And Procedure(s) (LRB):  CYSTOSCOPY WITH BILATERAL RETROGRADES PYLOGRAMS -  LEFT RENOURETEROSCOPY WITH HOLIUM LASER LITHOTRIPSY - LEFT URETERAL  STENT PLACEMENT   (Bilateral)  confirmed   with allergies discussed. Anesthesia type, drugs, patient history, complications, estimated blood loss, vital signs, intake and output, and last pain medication, lines and temperature were reviewed.

## 2017-03-20 NOTE — DISCHARGE INSTRUCTIONS
Cystoscopy: Care Instructions  Your Care Instructions  Cystoscopy is a test. It uses a thin, lighted tube called a cystoscope to see the inside of the bladder and the urethra. The urethra is the tube that carries urine out of the body. This test is helpful because it lets your doctor see areas of your bladder and urethra that are hard to see on X-rays. It can help your doctor find bladder stones, tumors, bleeding, and infection. During this test, your doctor also can take tissue and urine samples. And if your doctor finds small stones or growths, he or she can remove them. In most cases the scope is in the bladder for less than 10 minutes. But the entire test may take 45 minutes or longer. You will probably get local anesthesia. This numbs a small part of your body. Or you may get spinal anesthesia, which numbs more of your body. Once in a while, doctors use general anesthesia. It makes you sleep during surgery. If you get a local anesthetic, you may be able to get up right after the test. But if you had spinal or general anesthesia, you will stay in the recovery room until you are able to walk or you have feeling again in your lower body. This usually takes about an hour. Your doctor may be able to tell you some of the results right after the test. But the complete results may take several days. Follow-up care is a key part of your treatment and safety. Be sure to make and go to all appointments, and call your doctor if you are having problems. It's also a good idea to know your test results and keep a list of the medicines you take. How can you care for yourself at home? Before the test  · If you are having a local anesthetic, you can eat and drink before the test.  · If you are having a spinal or general anesthetic, do not eat or drink anything for at least 8 hours before the test. Tell your doctor what medicines you take.   · If you are not staying overnight in the hospital, make sure you have someone who can drive you home after the test.  After the test  · If your doctor prescribed antibiotics, take them as directed. Do not stop taking them just because you feel better. You need to take the full course of antibiotics. · You may have some burning when you urinate for a day or two after the test. You may feel better if you drink more fluids. This may also help prevent an infection. · Your urine may have a pinkish color for a few days after the test.  When should you call for help? Call your doctor now or seek immediate medical care if:  · Your urine is still red or you see blood clots after you have urinated several times. · You cannot pass urine 8 hours after the test.  · You get a fever or chills. · You have pain in your belly or your back just below your rib cage. This is also called flank pain. Watch closely for changes in your health, and be sure to contact your doctor if:  · You have pain or burning when you urinate. A burning sensation is normal for a day or two after the test. But call if it does not get better. · You have a frequent urge to urinate but can pass only small amounts of urine. · Your urine is pink, red, or cloudy or smells bad. It is normal for the urine to have a pinkish color for a few days after the test. But call if it does not get better. · You do not get better as expected. Where can you learn more? Go to http://shani-jasmin.info/. Enter M258 in the search box to learn more about \"Cystoscopy: Care Instructions. \"  Current as of: August 12, 2016  Content Version: 11.1  © 1004-2395 [employer]. Care instructions adapted under license by [employer] (which disclaims liability or warranty for this information). If you have questions about a medical condition or this instruction, always ask your healthcare professional. [address] any warranty or liability for your use of this information.       DISCHARGE SUMMARY from Nurse    The following personal items are in your possession at time of discharge:    Dental Appliances: None        Home Medications:  (states  brought inhaler  with her not seen by nurse )  Jewelry: None  Clothing: Dress, Undergarments, Footwear, Jacket/Coat (sweatshirt jacket no slip )  Other Valuables: None             PATIENT INSTRUCTIONS:    After general anesthesia or intravenous sedation, for 24 hours or while taking prescription Narcotics:  · Limit your activities  · Do not drive and operate hazardous machinery  · Do not make important personal or business decisions  · Do  not drink alcoholic beverages  · If you have not urinated within 8 hours after discharge, please contact your surgeon on call. Report the following to your surgeon:  · Excessive pain, swelling, redness or odor of or around the surgical area  · Temperature over 100.5  · Nausea and vomiting lasting longer than 4 hours or if unable to take medications  · Any signs of decreased circulation or nerve impairment to extremity: change in color, persistent  numbness, tingling, coldness or increase pain  · Any questions          *  Please give a list of your current medications to your Primary Care Provider. *  Please update this list whenever your medications are discontinued, doses are      changed, or new medications (including over-the-counter products) are added. *  Please carry medication information at all times in case of emergency situations. These are general instructions for a healthy lifestyle:    No smoking/ No tobacco products/ Avoid exposure to second hand smoke    Surgeon General's Warning:  Quitting smoking now greatly reduces serious risk to your health.     Obesity, smoking, and sedentary lifestyle greatly increases your risk for illness    A healthy diet, regular physical exercise & weight monitoring are important for maintaining a healthy lifestyle    You may be retaining fluid if you have a history of heart failure or if you experience any of the following symptoms:  Weight gain of 3 pounds or more overnight or 5 pounds in a week, increased swelling in our hands or feet or shortness of breath while lying flat in bed. Please call your doctor as soon as you notice any of these symptoms; do not wait until your next office visit. Recognize signs and symptoms of STROKE:    F-face looks uneven    A-arms unable to move or move unevenly    S-speech slurred or non-existent    T-time-call 911 as soon as signs and symptoms begin-DO NOT go       Back to bed or wait to see if you get better-TIME IS BRAIN. Warning Signs of HEART ATTACK     Call 911 if you have these symptoms:   Chest discomfort. Most heart attacks involve discomfort in the center of the chest that lasts more than a few minutes, or that goes away and comes back. It can feel like uncomfortable pressure, squeezing, fullness, or pain.  Discomfort in other areas of the upper body. Symptoms can include pain or discomfort in one or both arms, the back, neck, jaw, or stomach.  Shortness of breath with or without chest discomfort.  Other signs may include breaking out in a cold sweat, nausea, or lightheadedness. Don't wait more than five minutes to call 911 - MINUTES MATTER! Fast action can save your life. Calling 911 is almost always the fastest way to get lifesaving treatment. Emergency Medical Services staff can begin treatment when they arrive -- up to an hour sooner than if someone gets to the hospital by car. The discharge information has been reviewed with the patient and son. The patient and son verbalized understanding. Discharge medications reviewed with the patient and son and appropriate educational materials and side effects teaching were provided.

## 2017-03-20 NOTE — IP AVS SNAPSHOT
[address] [facility] 
[phone] Patient: Carlos Degroot MRN: [id number] :1960 You are allergic to the following Allergen Reactions Carboplatin Other (comments) Patient developed shortness of breath, felt faint, coughing, skin flushed and \"itchy\". This occurred on the patients 8th treatment. Iodinated Contrast Media - Oral And Iv Dye Rash Lipitor (Atorvastatin) Myalgia Percocet (Oxycodone-Acetaminophen) Other (comments) fever Shellfish Containing Products Hives Tape (Adhesive) Itching Other (comments) \"op site-- clear,thin tape\"--caused blister Recent Documentation Height Weight BMI OB Status Smoking Status 1.753 m 153.1 kg 49.84 kg/m2 Hysterectomy Never Smoker Emergency Contacts Name Discharge Info Relation Home Work Mobile [employer] [facility] [place] CAREGIVER [3] Son [22] [phone] About your hospitalization You were admitted on:  2017 You last received care in the:  [facility] You were discharged on:  2017 Unit phone number:  [phone] Why you were hospitalized Your primary diagnosis was:  Not on File Providers Seen During Your Hospitalizations Provider Role Specialty Primary office phone De Felder MD Attending Provider Urology [phone] Your Primary Care Physician (PCP) Primary Care Physician Office Phone Office Fax Isaiasia November [phone] Follow-up Information Follow up With Details Comments Contact Info Josh Jimenez MD   [address] [place] [address] [address] [facility] 
[phone] De Felder MD Schedule an appointment as soon as possible for a visit in 2 week(s)  [address] [place] [address] 
[phone] Your Appointments Tuesday March 28, 2017  9:00 AM EDT  
ESTABLISHED PATIENT with Magda Yuen MD  
[facility]) [address]  
[phone] Tuesday March 28, 2017  1:45 PM EDT CHEMOTHERAPY with Lawson White MD  
[facility] Gynecologic Oncology [address] [facility]) [address] [facility] [address] [place] [address] [address]  
[phone] Thursday March 30, 2017  9:00 AM EDT INFUSION 240 RI with  102B - CHAIR KAYLENE [facility] - [address] ([address]) [address] [facility] [address] [address]  
[phone] Go to [address], ground floor. Thursday April 06, 2017 10:00 AM EDT INFUSION 240 RI with MELONYMO INFUSION NURSE 7  
[address] [facility] ([address]) [address] [facility] [address] [address]  
[phone] Go to [address], ground floor. Current Discharge Medication List  
  
START taking these medications Dose & Instructions Dispensing Information Comments Morning Noon Evening Bedtime  
 fluconazole 150 mg tablet Commonly known as:  DIFLUCAN Your last dose was: Your next dose is:    
   
   
 Dose:  150 mg Take 1 Tab by mouth daily for 3 days. FDA advises cautious prescribing of oral fluconazole in pregnancy. Quantity:  3 Tab Refills:  0 CONTINUE these medications which have NOT CHANGED Dose & Instructions Dispensing Information Comments Morning Noon Evening Bedtime * albuterol 90 mcg/actuation inhaler Commonly known as:  PROVENTIL HFA, VENTOLIN HFA, PROAIR HFA Your last dose was: Your next dose is:    
   
   
 Dose:  2 Puff Take 2 Puffs by inhalation every four (4) hours as needed for Wheezing. Refills:  0 * albuterol 2.5 mg /3 mL (0.083 %) nebulizer solution Commonly known as:  PROVENTIL VENTOLIN Your last dose was: Your next dose is:    
   
   
 Dose:  2.5 mg  
3 mL by Nebulization route every four (4) hours as needed for Wheezing. Quantity:  24 Each Refills:  11 ALLEGRA 180 mg tablet Generic drug:  fexofenadine Your last dose was: Your next dose is:    
   
   
 Dose:  180 mg Take 180 mg by mouth daily. Refills:  0 BD INSULIN PEN NEEDLE UF SHORT 31 gauge x 5/16\" Ndle Generic drug:  Insulin Needles (Disposable) Your last dose was: Your next dose is:    
   
   
 USE WITH INSULIN TO INJECT FIVE TIMES DAILY. Quantity:  200 Pen Needle Refills:  11 Blood-Glucose Meter monitoring kit Your last dose was: Your next dose is:    
   
   
 Use as directed to test blood sugar three times daily-DX-DM-250.00 Quantity:  1 Kit Refills:  0 Please provide brand covered by insurance  
    
   
   
   
  
 cholecalciferol (VITAMIN D3) 5,000 unit Tab tablet Commonly known as:  VITAMIN D3 Your last dose was: Your next dose is: Take  by mouth daily. Refills:  0  
     
   
   
   
  
 clonazePAM 1 mg tablet Commonly known as:  Willeen Narrow Your last dose was: Your next dose is: TAKE 1 TABLET BY MOUTH TWICE A DAY Refills:  1  
     
   
   
   
  
 fluticasone-salmeterol 230-21 mcg/actuation inhaler Commonly known as:  ADVAIR HFA Your last dose was: Your next dose is:    
   
   
 Dose:  2 Puff Take 2 Puffs by inhalation two (2) times a day. Quantity:  12 g Refills:  5  
     
   
   
   
  
 furosemide 40 mg tablet Commonly known as:  LASIX Your last dose was: Your next dose is:    
   
   
 Dose:  80 mg Take 80 mg by mouth as needed. Refills:  3 HYDROcodone-acetaminophen 5-325 mg per tablet Commonly known as:  Michelle Leal Your last dose was: Your next dose is:    
   
   
 Dose:  1 Tab Take 1 Tab by mouth every four (4) hours as needed for Pain. Max Daily Amount: 6 Tabs. Quantity:  20 Tab Refills:  0 Lancets Misc Your last dose was: Your next dose is:    
   
   
 Use as directed to test blood sugar three times daily-DX-DM-250.00 Quantity:  270 Each Refills:  3 Please provide brand covered by insurance LEVEMIR FLEXTOUCH 100 unit/mL (3 mL) Inpn Generic drug:  insulin detemir Your last dose was: Your next dose is:    
   
   
 30 UNITS IN THE MORNING AND 50 UNITS AT NIGHT. INCREASE AS DIRECTED TO MAX  UNITS PER DAY Quantity:  30 mL Refills:  11  
     
   
   
   
  
 levothyroxine 150 mcg tablet Commonly known as:  SYNTHROID Your last dose was: Your next dose is: TAKE 1 TAB BY MOUTH DAILY (BEFORE BREAKFAST). Quantity:  90 Tab Refills:  3 Liraglutide 0.6 mg/0.1 mL (18 mg/3 mL) sub-q pen Commonly known as:  Sebas Randhawa Your last dose was: Your next dose is:    
   
   
 Dose:  1.8 mg  
0.3 mL by SubCUTAneous route daily (with lunch). Quantity:  3 Each Refills:  11  
     
   
   
   
  
 methocarbamol 500 mg tablet Commonly known as:  ROBAXIN Your last dose was: Your next dose is: Take  by mouth three (3) times daily. Refills:  0  
     
   
   
   
  
 montelukast 10 mg tablet Commonly known as:  SINGULAIR Your last dose was: Your next dose is:    
   
   
 Dose:  10 mg Take 1 Tab by mouth daily. Quantity:  90 Tab Refills:  2  
     
   
   
   
  
 multivitamin tablet Commonly known as:  ONE A DAY Your last dose was: Your next dose is:    
   
   
 Dose:  2 Tab Take 2 Tabs by mouth daily (after dinner). Refills:  0  
     
   
   
   
  
 NASONEX 50 mcg/actuation nasal spray Generic drug:  mometasone Your last dose was: Your next dose is:    
   
   
 Dose:  2 Spray  
2 sprays by Both Nostrils route as needed. Refills:  0 NovoLOG Flexpen 100 unit/mL Inpn Generic drug:  insulin aspart Your last dose was: Your next dose is: INJECT 20 UNITS BEFORE EACH MEAL + 4 UNITS FOR EVERY 50 MG/DL ABOVE 150 MG/DL.  UNITS PER DAY Quantity:  30 mL Refills:  11  
     
   
   
   
  
 ondansetron 8 mg disintegrating tablet Commonly known as:  ZOFRAN ODT Your last dose was: Your next dose is:    
   
   
 Dose:  8 mg Take 1 Tab by mouth every eight (8) hours as needed for Nausea. Quantity:  30 Tab Refills:  1 OTHER Your last dose was: Your next dose is:    
   
   
 Vitamin called easy promise takes two after dinner Refills:  0  
     
   
   
   
  
 potassium chloride SR 10 mEq tablet Commonly known as:  KLOR-CON 10 Your last dose was: Your next dose is:    
   
   
 Dose:  10 mEq Take 1 Tab by mouth two (2) days a week. Quantity:  30 Tab Refills:  1 RESTASIS 0.05 % ophthalmic emulsion Generic drug:  cycloSPORINE Your last dose was: Your next dose is:    
   
   
 Dose:  1 Drop 1 Drop two (2) times a day. Refills:  0  
     
   
   
   
  
 sertraline 50 mg tablet Commonly known as:  ZOLOFT Your last dose was: Your next dose is: TAKE 1 TABLET BY MOUTH EVERY MORNING Refills:  1  
     
   
   
   
  
 valsartan 160 mg tablet Commonly known as:  DIOVAN Your last dose was: Your next dose is: TAKE 1 TAB BY MOUTH DAILY. REPLACES VALSARTAN-HCTZ Quantity:  30 Tab Refills:  11  
     
   
   
   
  
 * Notice: This list has 2 medication(s) that are the same as other medications prescribed for you. Read the directions carefully, and ask your doctor or other care provider to review them with you. ASK your doctor about these medications Dose & Instructions Dispensing Information Comments Morning Noon Evening Bedtime  
 cephALEXin 500 mg capsule Commonly known as:  Simi Lindaes Ask about: Should I take this medication? Your last dose was: Your next dose is:    
   
   
 Dose:  500 mg Take 1 Cap by mouth four (4) times daily for 7 days. Quantity:  28 Cap Refills:  0  
     
   
   
   
  
 doxycycline 100 mg tablet Commonly known as:  VIBRA-TABS Ask about: Should I take this medication? Your last dose was: Your next dose is:    
   
   
 Dose:  100 mg Take 1 Tab by mouth two (2) times a day for 7 days. Quantity:  14 Tab Refills:  0 Where to Get Your Medications Information on where to get these meds will be given to you by the nurse or doctor. ! Ask your nurse or doctor about these medications  
  fluconazole 150 mg tablet Discharge Instructions Cystoscopy: Care Instructions Your Care Instructions Cystoscopy is a test. It uses a thin, lighted tube called a cystoscope to see the inside of the bladder and the urethra. The urethra is the tube that carries urine out of the body. This test is helpful because it lets your doctor see areas of your bladder and urethra that are hard to see on X-rays. It can help your doctor find bladder stones, tumors, bleeding, and infection. During this test, your doctor also can take tissue and urine samples. And if your doctor finds small stones or growths, he or she can remove them. In most cases the scope is in the bladder for less than 10 minutes. But the entire test may take 45 minutes or longer.  You will probably get local anesthesia. This numbs a small part of your body. Or you may get spinal anesthesia, which numbs more of your body. Once in a while, doctors use general anesthesia. It makes you sleep during surgery. If you get a local anesthetic, you may be able to get up right after the test. But if you had spinal or general anesthesia, you will stay in the recovery room until you are able to walk or you have feeling again in your lower body. This usually takes about an hour. Your doctor may be able to tell you some of the results right after the test. But the complete results may take several days. Follow-up care is a key part of your treatment and safety. Be sure to make and go to all appointments, and call your doctor if you are having problems. It's also a good idea to know your test results and keep a list of the medicines you take. How can you care for yourself at home? Before the test 
· If you are having a local anesthetic, you can eat and drink before the test. 
· If you are having a spinal or general anesthetic, do not eat or drink anything for at least 8 hours before the test. Tell your doctor what medicines you take. · If you are not staying overnight in the hospital, make sure you have someone who can drive you home after the test. 
After the test 
· If your doctor prescribed antibiotics, take them as directed. Do not stop taking them just because you feel better. You need to take the full course of antibiotics. · You may have some burning when you urinate for a day or two after the test. You may feel better if you drink more fluids. This may also help prevent an infection. · Your urine may have a pinkish color for a few days after the test. 
When should you call for help? Call your doctor now or seek immediate medical care if: 
· Your urine is still red or you see blood clots after you have urinated several times. · You cannot pass urine 8 hours after the test. 
· You get a fever or chills. · You have pain in your belly or your back just below your rib cage. This is also called flank pain. Watch closely for changes in your health, and be sure to contact your doctor if: 
· You have pain or burning when you urinate. A burning sensation is normal for a day or two after the test. But call if it does not get better. · You have a frequent urge to urinate but can pass only small amounts of urine. · Your urine is pink, red, or cloudy or smells bad. It is normal for the urine to have a pinkish color for a few days after the test. But call if it does not get better. · You do not get better as expected. Where can you learn more? Go to http://shani-jasmin.info/. Enter J774 in the search box to learn more about \"Cystoscopy: Care Instructions. \" Current as of: August 12, 2016 Content Version: 11.1 © 6971-5913 [employer]. Care instructions adapted under license by [employer] (which disclaims liability or warranty for this information). If you have questions about a medical condition or this instruction, always ask your healthcare professional. [place] [address] any warranty or liability for your use of this information. DISCHARGE SUMMARY from Nurse The following personal items are in your possession at time of discharge: 
 
Dental Appliances: None Home Medications:  (states  brought inhaler  with her not seen by nurse ) Jewelry: None Clothing: Dress, Undergarments, Footwear, Jacket/Coat (sweatshirt jacket no slip ) Other Valuables: None PATIENT INSTRUCTIONS: 
 
 
F-face looks uneven A-arms unable to move or move unevenly S-speech slurred or non-existent T-time-call 911 as soon as signs and symptoms begin-DO NOT go Back to bed or wait to see if you get better-TIME IS BRAIN. Warning Signs of HEART ATTACK Call 911 if you have these symptoms: ? Chest discomfort. Most heart attacks involve discomfort in the center of the chest that lasts more than a few minutes, or that goes away and comes back. It can feel like uncomfortable pressure, squeezing, fullness, or pain. ? Discomfort in other areas of the upper body. Symptoms can include pain or discomfort in one or both arms, the back, neck, jaw, or stomach. ? Shortness of breath with or without chest discomfort. ? Other signs may include breaking out in a cold sweat, nausea, or lightheadedness. Don't wait more than five minutes to call [address]! Fast action can save your life. Calling 911 is almost always the fastest way to get lifesaving treatment. Emergency Medical Services staff can begin treatment when they arrive  up to an hour sooner than if someone gets to the hospital by car. The discharge information has been reviewed with the patient and son. The patient and son verbalized understanding. Discharge medications reviewed with the patient and son and appropriate educational materials and side effects teaching were provided. Discharge Orders None Introducing [facility] SERVICES! Dear Neetu Michele: Thank you for requesting a [facility] account. Our records indicate that you already have an active [facility] account. You can access your account anytime at [email or web address] Did you know that you can access your hospital and ER discharge instructions at any time in [facility]? You can also review all of your test results from your hospital stay or ER visit. Additional Information If you have questions, please visit the Frequently Asked Questions section of the [facility] website at [email or web address] Remember, [facility] is NOT to be used for urgent needs. For medical emergencies, dial 911. Now available from your iPhone and Android! General Information Please provide this summary of care documentation to your next provider. Patient Signature:  ____________________________________________________________ Date:  ____________________________________________________________  
  
Selene Charter Provider Signature:  ____________________________________________________________ Date:  ____________________________________________________________

## 2017-03-20 NOTE — ANESTHESIA POSTPROCEDURE EVALUATION
Post-Anesthesia Evaluation and Assessment    Patient: Monique Chavis MRN: [id number]  SSN: [id number]    YOB: 1960  Age: 55 y.o. Sex: female       Cardiovascular Function/Vital Signs  Visit Vitals    /70 (BP 1 Location: Right arm, BP Patient Position: At rest)    Pulse 80    Temp 36.6 °C (97.8 °F)    Resp 18    Ht 5' 9\" (1.753 m)    Wt 153.1 kg (337 lb 8.4 oz)    SpO2 95%    BMI 49.84 kg/m2       Patient is status post general anesthesia for Procedure(s):  CYSTOSCOPY WITH BILATERAL RETROGRADES PYLOGRAMS -  LEFT RENOURETEROSCOPY WITH HOLIUM LASER LITHOTRIPSY - LEFT URETERAL  STENT PLACEMENT  . Nausea/Vomiting: None    Postoperative hydration reviewed and adequate. Pain:  Pain Scale 1: Numeric (0 - 10) (03/20/17 1545)  Pain Intensity 1: 0 (03/20/17 1545)   Managed    Neurological Status:   Neuro (WDL): Exceptions to WDL (03/20/17 1510)  Neuro  Neurologic State: Drowsy; Eyes open to stimulus; Eyes open to voice (03/20/17 1510)  Orientation Level: Oriented X4 (03/20/17 1510)  Cognition: Follows commands (03/20/17 1510)  Speech: Clear (03/20/17 1510)  Assessment L Pupil: Deferred (03/20/17 1131)  Assessment R Pupil: Deferred (03/20/17 1131)  LUE Motor Response: Purposeful (03/20/17 1510)  LLE Motor Response: Purposeful;Weak (03/20/17 1510)  RUE Motor Response: Purposeful (03/20/17 1510)  RLE Motor Response: Purposeful;Weak (03/20/17 1510)   At baseline    Mental Status and Level of Consciousness: Arousable    Pulmonary Status:   O2 Device: Nasal cannula (03/20/17 1545)   Adequate oxygenation and airway patent    Complications related to anesthesia: None    Post-anesthesia assessment completed.  No concerns    Signed By: Milagro Leigh MD     March 20, 2017

## 2017-03-20 NOTE — ANESTHESIA PREPROCEDURE EVALUATION
Anesthetic History     PONV          Review of Systems / Medical History  Patient summary reviewed, nursing notes reviewed and pertinent labs reviewed    Pulmonary        Sleep apnea: CPAP    Asthma        Neuro/Psych         Psychiatric history     Cardiovascular    Hypertension          CAD and hyperlipidemia      Comments: Normal Nuclear Stress Test (3/1/17)  Thromboembolus   GI/Hepatic/Renal     GERD    Renal disease: ARF and stones       Endo/Other    Diabetes: well controlled, type 2, using insulin  Hypothyroidism: well controlled  Morbid obesity, arthritis, cancer and anemia    Comments: Ovarian cancer, Stage IIIc s/p chemotherapy (2012, 2014)  Thrombocytopenia Other Findings   Comments: Fibromyalgia         Physical Exam    Airway  Mallampati: I  TM Distance: > 6 cm  Neck ROM: normal range of motion   Mouth opening: Normal     Cardiovascular    Rhythm: regular  Rate: normal         Dental    Dentition: Lower dentition intact and Upper dentition intact     Pulmonary  Breath sounds clear to auscultation               Abdominal  GI exam deferred       Other Findings            Anesthetic Plan    ASA: 3  Anesthesia type: general          Induction: Intravenous  Anesthetic plan and risks discussed with: Patient

## 2017-03-20 NOTE — IP AVS SNAPSHOT
Current Discharge Medication List  
  
START taking these medications Dose & Instructions Dispensing Information Comments Morning Noon Evening Bedtime  
 fluconazole 150 mg tablet Commonly known as:  DIFLUCAN Your last dose was: Your next dose is:    
   
   
 Dose:  150 mg Take 1 Tab by mouth daily for 3 days. FDA advises cautious prescribing of oral fluconazole in pregnancy. Quantity:  3 Tab Refills:  0 CONTINUE these medications which have NOT CHANGED Dose & Instructions Dispensing Information Comments Morning Noon Evening Bedtime * albuterol 90 mcg/actuation inhaler Commonly known as:  PROVENTIL HFA, VENTOLIN HFA, PROAIR HFA Your last dose was: Your next dose is:    
   
   
 Dose:  2 Puff Take 2 Puffs by inhalation every four (4) hours as needed for Wheezing. Refills:  0  
     
   
   
   
  
 * albuterol 2.5 mg /3 mL (0.083 %) nebulizer solution Commonly known as:  PROVENTIL VENTOLIN Your last dose was: Your next dose is:    
   
   
 Dose:  2.5 mg  
3 mL by Nebulization route every four (4) hours as needed for Wheezing. Quantity:  24 Each Refills:  11 ALLEGRA 180 mg tablet Generic drug:  fexofenadine Your last dose was: Your next dose is:    
   
   
 Dose:  180 mg Take 180 mg by mouth daily. Refills:  0 BD INSULIN PEN NEEDLE UF SHORT 31 gauge x 5/16\" Ndle Generic drug:  Insulin Needles (Disposable) Your last dose was: Your next dose is:    
   
   
 USE WITH INSULIN TO INJECT FIVE TIMES DAILY. Quantity:  200 Pen Needle Refills:  11 Blood-Glucose Meter monitoring kit Your last dose was: Your next dose is:    
   
   
 Use as directed to test blood sugar three times daily-DX-DM-250.00 Quantity:  1 Kit Refills:  0 Please provide brand covered by insurance  
    
   
   
   
  
 cholecalciferol (VITAMIN D3) 5,000 unit Tab tablet Commonly known as:  VITAMIN D3 Your last dose was: Your next dose is: Take  by mouth daily. Refills:  0  
     
   
   
   
  
 clonazePAM 1 mg tablet Commonly known as:  Ricardo Swartz Your last dose was: Your next dose is: TAKE 1 TABLET BY MOUTH TWICE A DAY Refills:  1  
     
   
   
   
  
 fluticasone-salmeterol 230-21 mcg/actuation inhaler Commonly known as:  ADVAIR HFA Your last dose was: Your next dose is:    
   
   
 Dose:  2 Puff Take 2 Puffs by inhalation two (2) times a day. Quantity:  12 g Refills:  5  
     
   
   
   
  
 furosemide 40 mg tablet Commonly known as:  LASIX Your last dose was: Your next dose is:    
   
   
 Dose:  80 mg Take 80 mg by mouth as needed. Refills:  3 HYDROcodone-acetaminophen 5-325 mg per tablet Commonly known as:  John Tomlinson Your last dose was: Your next dose is:    
   
   
 Dose:  1 Tab Take 1 Tab by mouth every four (4) hours as needed for Pain. Max Daily Amount: 6 Tabs. Quantity:  20 Tab Refills:  0 Lancets Misc Your last dose was: Your next dose is:    
   
   
 Use as directed to test blood sugar three times daily-DX-DM-250.00 Quantity:  270 Each Refills:  3 Please provide brand covered by insurance LEVEMIR FLEXTOUCH 100 unit/mL (3 mL) Inpn Generic drug:  insulin detemir Your last dose was: Your next dose is:    
   
   
 30 UNITS IN THE MORNING AND 50 UNITS AT NIGHT. INCREASE AS DIRECTED TO MAX  UNITS PER DAY Quantity:  30 mL Refills:  11  
     
   
   
   
  
 levothyroxine 150 mcg tablet Commonly known as:  SYNTHROID Your last dose was: Your next dose is: TAKE 1 TAB BY MOUTH DAILY (BEFORE BREAKFAST). Quantity:  90 Tab Refills:  3 Liraglutide 0.6 mg/0.1 mL (18 mg/3 mL) sub-q pen Commonly known as:  Trinity Banker Your last dose was: Your next dose is:    
   
   
 Dose:  1.8 mg  
0.3 mL by SubCUTAneous route daily (with lunch). Quantity:  3 Each Refills:  11  
     
   
   
   
  
 methocarbamol 500 mg tablet Commonly known as:  ROBAXIN Your last dose was: Your next dose is: Take  by mouth three (3) times daily. Refills:  0  
     
   
   
   
  
 montelukast 10 mg tablet Commonly known as:  SINGULAIR Your last dose was: Your next dose is:    
   
   
 Dose:  10 mg Take 1 Tab by mouth daily. Quantity:  90 Tab Refills:  2  
     
   
   
   
  
 multivitamin tablet Commonly known as:  ONE A DAY Your last dose was: Your next dose is:    
   
   
 Dose:  2 Tab Take 2 Tabs by mouth daily (after dinner). Refills:  0  
     
   
   
   
  
 NASONEX 50 mcg/actuation nasal spray Generic drug:  mometasone Your last dose was: Your next dose is:    
   
   
 Dose:  2 Spray  
2 sprays by Both Nostrils route as needed. Refills:  0 NovoLOG Flexpen 100 unit/mL Inpn Generic drug:  insulin aspart Your last dose was: Your next dose is: INJECT 20 UNITS BEFORE EACH MEAL + 4 UNITS FOR EVERY 50 MG/DL ABOVE 150 MG/DL.  UNITS PER DAY Quantity:  30 mL Refills:  11  
     
   
   
   
  
 ondansetron 8 mg disintegrating tablet Commonly known as:  ZOFRAN ODT Your last dose was: Your next dose is:    
   
   
 Dose:  8 mg Take 1 Tab by mouth every eight (8) hours as needed for Nausea. Quantity:  30 Tab Refills:  1 OTHER Your last dose was: Your next dose is:    
   
   
 Vitamin called easy promise takes two after dinner Refills:  0  
     
   
   
   
  
 potassium chloride SR 10 mEq tablet Commonly known as:  KLOR-CON 10 Your last dose was: Your next dose is:    
   
   
 Dose:  10 mEq Take 1 Tab by mouth two (2) days a week. Quantity:  30 Tab Refills:  1 RESTASIS 0.05 % ophthalmic emulsion Generic drug:  cycloSPORINE Your last dose was: Your next dose is:    
   
   
 Dose:  1 Drop 1 Drop two (2) times a day. Refills:  0  
     
   
   
   
  
 sertraline 50 mg tablet Commonly known as:  ZOLOFT Your last dose was: Your next dose is: TAKE 1 TABLET BY MOUTH EVERY MORNING Refills:  1  
     
   
   
   
  
 valsartan 160 mg tablet Commonly known as:  DIOVAN Your last dose was: Your next dose is: TAKE 1 TAB BY MOUTH DAILY. REPLACES VALSARTAN-HCTZ Quantity:  30 Tab Refills:  11 * Notice: This list has 2 medication(s) that are the same as other medications prescribed for you. Read the directions carefully, and ask your doctor or other care provider to review them with you. ASK your doctor about these medications Dose & Instructions Dispensing Information Comments Morning Noon Evening Bedtime  
 cephALEXin 500 mg capsule Commonly known as:  Maranda Officer Ask about: Should I take this medication? Your last dose was: Your next dose is:    
   
   
 Dose:  500 mg Take 1 Cap by mouth four (4) times daily for 7 days. Quantity:  28 Cap Refills:  0  
     
   
   
   
  
 doxycycline 100 mg tablet Commonly known as:  VIBRA-TABS Ask about: Should I take this medication? Your last dose was: Your next dose is:    
   
   
 Dose:  100 mg Take 1 Tab by mouth two (2) times a day for 7 days. Quantity:  14 Tab Refills:  0 Where to Get Your Medications Information on where to get these meds will be given to you by the nurse or doctor. ! Ask your nurse or doctor about these medications  
  fluconazole 150 mg tablet

## 2017-03-21 NOTE — OP NOTES
[facility]   [address] [facility], [address]   OP NOTE       Name:  Luis Montgomery   MR#:  [id number]   :  1960   Account #:  [de-identified]    Surgery Date:  2017   Date of Adm:  2017       PREOPERATIVE DIAGNOSIS:  Left renal stone. POSTOPERATIVE DIAGNOSIS:  Left renal stone. PROCEDURES PERFORMED:     1. Cystoscopy. 2. Bilateral retrograde pyelograms. 3. Left ureterorenoscopy with laser lithotripsy and stent. ESTIMATED BLOOD LOSS: None. SPECIMENS REMOVED: None. ANESTHESIA:  General.    SURGEON: Alex Vegas MD    COMPLICATIONS: None. INDICATIONS: She is a 55-year-old [facility] American female with   known symptomatic left renal stone and recurrent infections. She also   has a history of metastatic ovarian or uterine cancer on chemotherapy. She recently was treated with antibiotics for UTI, with most recent urine   culture demonstrating yeast only. She is not running any fevers,   recently had chemo and transfusion, and is doing overall well, although   she does have slowly progressive metastatic disease. She has been   on oral antibiotics at home and received preoperative Ancef. DESCRIPTION OF PROCEDURE: She underwent a general   anesthetic, was placed in dorsal lithotomy position and prepped and   draped in a sterile fashion. A time-out was called confirming the   planned procedure. The 21-Malian cystoscope with a 30-degree lens   was used to enter the bladder. The bladder had some debris, was   otherwise normal on complete examination. The right ureteral orifice   was intubated with a TigerTail catheter and contrast was injected,   demonstrating a normal collecting system without evidence of stones. On the left side, the ureter was normal, and the kidney had no   significant hydronephrosis, but there was a large filling defect in the left   renal pelvis consistent with her stone.  A 0.035 wire was placed through   the TigerTail up into the collecting system under fluoroscopy and some   debris was noted emanating from the left ureteral orifice. A semi-rigid   ureteroscope with high pressure irrigant was then used alongside the   0.035 wire and I was able to access the kidney, however, the angle   was wrong for the holmium laser fiber. Therefore, a 0.025 wire was   placed. I was able to place a flexible ureteroscope over it under   fluoroscopy up into the left renal pelvis. I then used a 365 holmium   laser fiber with various laser settings to fragment the stone. See laser   log for detail. All stones were fragmented into less than 2 mm pieces   without trauma or bleeding. Once this was complete, I left the 0.035   wire in, backloaded the wire through the cystoscope and placed a 6-  [place], 24 cm double-J stent with a 3-inch string through the   cystoscope over the wire under fluoroscopic guidance and confirmed   to be in good position, both fluoroscopically and cystoscopically. The   debris was irrigated from the bladder. The bladder was emptied,   completing the procedure, which she tolerated well. She was stable to   the recovery area where she will be discharged stable on her current   oral antibiotics plus Diflucan. Operative findings discussed with her   son.         MD Tato Dowd   D:  03/20/2017   15:09   T:  03/20/2017   22:42   Job #:  [id number]

## 2017-03-27 ENCOUNTER — TELEPHONE (OUTPATIENT)
Dept: GYNECOLOGY | Age: 57
End: 2017-03-27

## 2017-03-28 ENCOUNTER — APPOINTMENT (OUTPATIENT)
Dept: NUCLEAR MEDICINE | Age: 57
End: 2017-03-28
Attending: INTERNAL MEDICINE
Payer: MEDICARE

## 2017-03-28 ENCOUNTER — OFFICE VISIT (OUTPATIENT)
Dept: CARDIOLOGY CLINIC | Age: 57
End: 2017-03-28

## 2017-03-28 VITALS
OXYGEN SATURATION: 96 % | HEIGHT: 69 IN | DIASTOLIC BLOOD PRESSURE: 77 MMHG | SYSTOLIC BLOOD PRESSURE: 147 MMHG | HEART RATE: 79 BPM | WEIGHT: 293 LBS | BODY MASS INDEX: 43.4 KG/M2

## 2017-03-28 DIAGNOSIS — I10 BENIGN ESSENTIAL HYPERTENSION: Primary | ICD-10-CM

## 2017-03-28 DIAGNOSIS — E78.5 DYSLIPIDEMIA (HIGH LDL; LOW HDL): ICD-10-CM

## 2017-03-28 DIAGNOSIS — Z85.43 HISTORY OF OVARIAN CANCER: ICD-10-CM

## 2017-03-28 DIAGNOSIS — Z79.4 CONTROLLED TYPE 2 DIABETES MELLITUS WITHOUT COMPLICATION, WITH LONG-TERM CURRENT USE OF INSULIN (HCC): ICD-10-CM

## 2017-03-28 DIAGNOSIS — R07.89 CHEST PAIN, ATYPICAL: ICD-10-CM

## 2017-03-28 DIAGNOSIS — I27.20 PULMONARY HYPERTENSION, MILD (HCC): ICD-10-CM

## 2017-03-28 DIAGNOSIS — G47.30 SLEEP APNEA, UNSPECIFIED TYPE: ICD-10-CM

## 2017-03-28 DIAGNOSIS — E11.9 CONTROLLED TYPE 2 DIABETES MELLITUS WITHOUT COMPLICATION, WITH LONG-TERM CURRENT USE OF INSULIN (HCC): ICD-10-CM

## 2017-03-28 NOTE — MR AVS SNAPSHOT
Visit Information Date & Time Provider Department Dept. Phone Encounter #  
 3/28/2017  9:00 AM Salvador Baca MD [facility] at Freda Dutta 1 [id number] Your Appointments 4/25/2017  2:15 PM  
CHEMOTHERAPY with Mickey Saul MD  
[facility] [address] [place] [address]) Appt Note: 3rd floor labs today/for C3D1 gemzar on 4/27/17  
 [address] [facility] [address] [phone]  
[address] [phone]  
  
    
 5/23/2017  2:15 PM  
CHEMOTHERAPY with Mickey Saul MD  
[facility] [address] [place] [address]) Appt Note: 3rd floor labs today/for C3D8 Gemzar on 5/4/17 [address] [facility] [address] [phone]  
[phone] 6/13/2017  8:15 AM  
ROUTINE CARE with Marlee Bridges MD  
[facility] [address] [facility]) Appt Note: 6 month follow up  
 [facility] [address]  
  
   
 [place] [address] [facility]  [address] [phone]  
  
    
 9/14/2017  8:50 AM  
Follow Up with Tutu Lynch MD  
[facility] [address] [place] [address]) Appt Note: 6 month f/u  
 [address] [facility] [address] [phone] [address] [place] [address] 2/15/2018  9:00 AM  
ESTABLISHED PATIENT with Salvador Baca MD  
[facility] at [facility]) Appt Note: 1 YR F/U  
 [address]  
[phone] [address] Upcoming Health Maintenance Date Due Hepatitis C Screening 1960 Pneumococcal 19-64 Highest Risk (1 of 3 - PCV13) 7/28/1980 DTaP/Tdap/Td series (1 - Tdap) 7/28/1982 COLONOSCOPY 10/14/2016 MEDICARE YEARLY EXAM 10/15/2016 HEMOGLOBIN A1C Q6M 8/27/2017 PAP AKA CERVICAL CYTOLOGY 8/29/2017 FOOT EXAM Q1 9/2/2017 EYE EXAM RETINAL OR DILATED Q1 11/15/2017 MICROALBUMIN Q1 2/27/2018 LIPID PANEL Q1 2/27/2018 BREAST CANCER SCRN MAMMOGRAM 9/22/2018 Allergies as of 3/28/2017  Review Complete On: 3/28/2017 By: Tiarra Drake PA-C Severity Noted Reaction Type Reactions Carboplatin  08/29/2014    Other (comments) Patient developed shortness of breath, felt faint, coughing, skin flushed and \"itchy\". This occurred on the patients 8th treatment. Iodinated Contrast Media - Oral And Iv Dye  03/25/2012   Topical Rash Lipitor [Atorvastatin]  04/04/2014    Myalgia Percocet [Oxycodone-acetaminophen]  03/25/2012   Not Verified Other (comments) fever Shellfish Containing Products  10/04/2012    Hives Tape [Adhesive]  03/16/2017    Itching, Other (comments) \"op site-- clear,thin tape\"--caused blister Current Immunizations  Reviewed on 3/10/2017 Name Date Influenza Vaccine (Quad) PF 12/13/2016 Not reviewed this visit You Were Diagnosed With   
  
 Codes Comments Benign essential hypertension    -  Primary ICD-10-CM: I10 
ICD-9-CM: 401.1 Angina pectoris associated with type 2 diabetes mellitus ([facility]     ICD-10-CM: E11.59, I20.9 ICD-9-CM: 250.80, 413.9 History of ovarian cancer     ICD-10-CM: Z85.43 
ICD-9-CM: V10.43 Pulmonary hypertension, mild (HCC)     ICD-10-CM: I27.2 ICD-9-CM: 416.8 Vitals BP Pulse Height(growth percentile) Weight(growth percentile) LMP SpO2  
 147/77 79 5' 9\" (1.753 m) 332 lb (150.6 kg) 09/07/2011 96% BMI OB Status Smoking Status 49.03 kg/m2 Hysterectomy Never Smoker Vitals History BMI and BSA Data Body Mass Index Body Surface Area 49.03 kg/m 2 2.71 m 2 Preferred Pharmacy Pharmacy Name Phone [id number]- [address] [phone] Your Updated Medication List  
  
   
This list is accurate as of: 3/28/17  9:43 AM.  Always use your most recent med list.  
  
  
  
  
 * albuterol 90 mcg/actuation inhaler Commonly known as:  PROVENTIL HFA, VENTOLIN HFA, PROAIR HFA Take 2 Puffs by inhalation every four (4) hours as needed for Wheezing. * albuterol 2.5 mg /3 mL (0.083 %) nebulizer solution Commonly known as:  PROVENTIL VENTOLIN  
3 mL by Nebulization route every four (4) hours as needed for Wheezing. ALLEGRA 180 mg tablet Generic drug:  fexofenadine Take 180 mg by mouth daily. BD INSULIN PEN NEEDLE UF SHORT 31 gauge x 5/16\" Ndle Generic drug:  Insulin Needles (Disposable) USE WITH INSULIN TO INJECT FIVE TIMES DAILY. Blood-Glucose Meter monitoring kit Use as directed to test blood sugar three times daily-DX-DM-250.00  
  
 cholecalciferol (VITAMIN D3) 5,000 unit Tab tablet Commonly known as:  VITAMIN D3 Take  by mouth daily. clonazePAM 1 mg tablet Commonly known as:  KlonoPIN  
TAKE 1 TABLET BY MOUTH TWICE A DAY  
  
 fluticasone-salmeterol 230-21 mcg/actuation inhaler Commonly known as:  ADVAIR HFA Take 2 Puffs by inhalation two (2) times a day. furosemide 40 mg tablet Commonly known as:  LASIX Take 80 mg by mouth as needed. HYDROcodone-acetaminophen 5-325 mg per tablet Commonly known as:  Bryon Ill Take 1 Tab by mouth every four (4) hours as needed for Pain. Max Daily Amount: 6 Tabs. Lancets Misc Use as directed to test blood sugar three times daily-DX-DM-250.00 LEVEMIR FLEXTOUCH 100 unit/mL (3 mL) Inpn Generic drug:  insulin detemir 30 UNITS IN THE MORNING AND 50 UNITS AT NIGHT. INCREASE AS DIRECTED TO MAX  UNITS PER DAY  
  
 levothyroxine 150 mcg tablet Commonly known as:  SYNTHROID  
TAKE 1 TAB BY MOUTH DAILY (BEFORE BREAKFAST). Liraglutide 0.6 mg/0.1 mL (18 mg/3 mL) sub-q pen Commonly known as:  VICTOZA  
0.3 mL by SubCUTAneous route daily (with lunch). methocarbamol 500 mg tablet Commonly known as:  ROBAXIN Take  by mouth three (3) times daily. montelukast 10 mg tablet Commonly known as:  SINGULAIR Take 1 Tab by mouth daily. multivitamin tablet Commonly known as:  ONE A DAY Take 2 Tabs by mouth daily (after dinner). NASONEX 50 mcg/actuation nasal spray Generic drug:  mometasone 2 sprays by Both Nostrils route as needed. NovoLOG Flexpen 100 unit/mL Inpn Generic drug:  insulin aspart INJECT 20 UNITS BEFORE EACH MEAL + 4 UNITS FOR EVERY 50 MG/DL ABOVE 150 MG/DL.  UNITS PER DAY  
  
 ondansetron 8 mg disintegrating tablet Commonly known as:  ZOFRAN ODT Take 1 Tab by mouth every eight (8) hours as needed for Nausea. OTHER Vitamin called easy promise takes two after dinner  
  
 potassium chloride SR 10 mEq tablet Commonly known as:  KLOR-CON 10 Take 1 Tab by mouth two (2) days a week. RESTASIS 0.05 % ophthalmic emulsion Generic drug:  cycloSPORINE  
1 Drop two (2) times a day. sertraline 50 mg tablet Commonly known as:  ZOLOFT  
TAKE 1 TABLET BY MOUTH EVERY MORNING  
  
 valsartan 160 mg tablet Commonly known as:  DIOVAN  
TAKE 1 TAB BY MOUTH DAILY. REPLACES VALSARTAN-HCTZ * Notice: This list has 2 medication(s) that are the same as other medications prescribed for you. Read the directions carefully, and ask your doctor or other care provider to review them with you. To-Do List   
 04/27/2017 10:00 AM  
  Appointment with [address] [place] [address] at [facility] ([phone])  
  
 05/04/2017 9:00 AM  
  Appointment with [address] [place] [address] at [facility] ([phone])  
  
 05/25/2017 10:00 AM  
  Appointment with [address] [place] [address] at [facility] ([phone])  
  
 06/01/2017 9:00 AM  
 Appointment with [address] [facility] [address] at [facility] ([phone]) Patient Instructions -- 6 month follow up Pulmonary Hypertension: Care Instructions Your Care Instructions Pulmonary hypertension is high blood pressure in the arteries of your lungs. These blood vessels carry blood from the heart to the lungs, where the blood picks up oxygen. The walls of the arteries may get thick, and the arteries may get narrow. When this happens, blood does not flow as well as it should. Pressure builds up in the arteries. Then your heart has to work harder to pump blood through your lungs. There are different types of pulmonary hypertension. They are caused by different things. Causes include other health conditions such as heart or lung problems. Sometimes it can happen without a known cause. When you have this condition, your body gets less oxygen from your blood. This causes symptoms such as shortness of breath and feeling tired, faint, or dizzy. Over time, these symptoms may change or get worse if your heart gets weaker. You may get heart failure. Heart failure means your heart doesn't pump as much blood as your body needs. Treatment can help you feel better and live longer. Your treatment options will depend on the type of pulmonary hypertension you have. It can be hard to learn that you have a problem with your lungs and heart. But there are things you can do to feel better and stay as active as you can. Follow-up care is a key part of your treatment and safety. Be sure to make and go to all appointments, and call your doctor if you are having problems. It's also a good idea to know your test results and keep a list of the medicines you take. How can you care for yourself at home? Medicine · Be safe with medicines. Take your medicines exactly as prescribed. Call your doctor if you think you are having a problem with your medicine.  You will get more details on the specific medicines your doctor prescribes. · Your doctor may prescribe oxygen therapy. You will get more details on how to use it. · Talk to your doctor before you take any vitamins, over-the-counter medicine, or herbal products. Don't take ibuprofen (Advil or Motrin) and naproxen (Aleve) without talking to your doctor first. 
· If you take a blood thinner, be sure to get instructions about how to take your medicine safely. Blood thinners can cause serious bleeding problems. Activity · Be as active as you can. Talk to your doctor about making a plan before you start a new activity. · Ask your doctor if a pulmonary rehabilitation program is right for you. · Learn how to save your energy. Making small changes in daily activities can make a big impact on how you feel. Staying healthy · Eat healthy foods, and try to stay at a healthy weight. · Do not smoke. Smoking can make this condition worse. If you need help quitting, talk to your doctor about stop-smoking programs and medicines. These can increase your chances of quitting for good. · Talk to your doctor about preventing pregnancy. You may need to take steps to avoid becoming pregnant. Pregnancy and childbirth can cause changes in the body that could be life-threatening for women who have this condition. · Avoid colds and flu. Get a pneumococcal vaccine shot. If you have had one before, ask your doctor if you need another dose. Get a flu vaccine every year. If you must be around people with colds or flu, wash your hands often. When should you call for help? Call 911 anytime you think you may need emergency care. For example, call if: 
· You have symptoms of sudden heart failure. These may include: ¨ Severe trouble breathing. ¨ A fast or irregular heartbeat. ¨ Coughing up pink, foamy mucus. ¨ Passing out. Call your doctor now or seek immediate medical care if: · You have new or changed symptoms of heart failure, such as: ¨ New or increased shortness of breath. ¨ New or worse swelling in your legs, ankles, or feet. ¨ Sudden weight gain, such as 3 pounds or more in 2 to 3 days. (Your doctor may suggest a different range of weight gain.) ¨ Feeling dizzy or lightheaded or like you may faint. ¨ Feeling so tired or weak that you cannot do your usual activities. ¨ Not sleeping well. Shortness of breath wakes you at night. You need extra pillows to prop yourself up to breathe easier. Watch closely for changes in your health, and be sure to contact your doctor if: 
· You have new or worse symptoms. Where can you learn more? Go to http://shani-jasmin.info/. Enter T841 in the search box to learn more about \"Pulmonary Hypertension: Care Instructions. \" Current as of: January 27, 2016 Content Version: 11.1 © 9556-9739 [employer]. Care instructions adapted under license by [employer] (which disclaims liability or warranty for this information). If you have questions about a medical condition or this instruction, always ask your healthcare professional. [place] [address] any warranty or liability for your use of this information. Introducing [facility]! Dear Mariaelena Marlow: Thank you for requesting a iCabbi account. Our records indicate that you already have an active iCabbi account. You can access your account anytime at [email or web address] Did you know that you can access your hospital and ER discharge instructions at any time in iCabbi? You can also review all of your test results from your hospital stay or ER visit. Additional Information If you have questions, please visit the Frequently Asked Questions section of the iCabbi website at [email or web address] Remember, iCabbi is NOT to be used for urgent needs. For medical emergencies, dial 911. Now available from your iPhone and Android! Please provide this summary of care documentation to your next provider. Your primary care clinician is listed as Jose Daniel Benitez. If you have any questions after today's visit, please call [phone].

## 2017-03-28 NOTE — PROGRESS NOTES
[facility]   [address] [facility], [address] [place] [address] Road                                          NEW PATIENT HPI/FOLLOW-UP      NAME:  Lola Alvarez   :   1960   MRN:   [id number]   PCP:  Gloria Saenz MD           Subjective: The patient is a 55y.o. year old female h/o asthma, sleep apnea, ovarian cancer s/p chemo, renal calculi s/p surgery for removal who returns for a routine follow-up on recent echo and stress test results. Since the last visit, patient reports no new symptoms. Reports kidney stone surgery went well. Recently saw her pulmonologist, Dr. Jaycee Mann. Pt reports Dr. Jaycee Mann found a lymph node in her lungs of concern, given her h/o ovarian cancer. Current plan is surveillance. Reports chronic LE edema L>R, for which she wear support hose throughout the day. Denies change in exercise tolerance, chest pain, medication intolerance, palpitations, shortness of breath, PND/orthopnea wheezing, sputum, syncope, dizziness or light headedness. Doing satisfactorily. Review of Systems  General: Pt denies excessive weight gain or loss. Pt is able to conduct ADL's. Respiratory: Denies shortness of breath, ECKERT, wheezing or stridor. Cardiovascular: +edema, Denies precordial pain, palpitations, or PND  Gastrointestinal: Denies poor appetite, indigestion, abdominal pain or blood in stool  Peripheral vascular: Denies claudication, leg cramps  Neuropsychiatric: Denies paresthesias,tingling,numbness,anxiety,depression,fatigue  Musculoskeletal: Denies pain,tenderness, soreness,swelling      Past Medical History:   Diagnosis Date    Adverse effect of anesthesia     sleep apnea cpap machine useage     Anemia     Arthritis     DDD low back and knees    Asthma     uses albuterol prn only; none x 6 mos.   Never hospitalized    BRCA negative 2013    Calculus of kidney     Chronic kidney disease     kidney stones    Chronic pain     knee pain bilat    CINV (chemotherapy-induced nausea and vomiting) 8/21/2014    Diabetes ([address] Age 46    IDDM    Environmental allergies     Fibromyalgia     GERD (gastroesophageal reflux disease)     controlled with med    Hypertension     Ill-defined condition     Sepsis 9-2015    Ill-defined condition 2016    chemotherapy     Morbid obesity ([address]     Other and unspecified hyperlipidemia     Ovarian cancer ([address] 9/2012, 1/2014    serous, high grade, stage IIIc. s/p chemotx (has port)    Psychiatric disorder     Rectal bleeding     Thromboembolus ([address] 1993    POST PARTUM; resolved- PELVIS    Thyroid disease     HYPOTHYROID    Unspecified sleep apnea     CPAP, Dr. Lizzy Olivares     Patient Active Problem List    Diagnosis Date Noted    Pulmonary hypertension, mild ([address] 03/28/2017    ARF (acute renal failure) ([address] 03/02/2017    Renal calculi 03/02/2017    Elevated BUN 03/02/2017    Hyperglycemia due to type 2 diabetes mellitus ([address] 02/15/2017    Preop cardiovascular exam 02/15/2017    Angina pectoris associated with type 2 diabetes mellitus ([address] 02/15/2017    Ovarian cancer ([address] 04/28/2015    Elevated CA-125 02/23/2015    Swelling of eye, bilateral--left > right 01/18/2015    Portacath in place--left 01/18/2015    Chest pain, atypical 01/18/2015    Hx of pulmonary embolus during pregnancy 01/18/2015    CINV (chemotherapy-induced nausea and vomiting) 08/21/2014    Benign essential hypertension 04/04/2014    Disorder of thyroid 04/04/2014    Dyslipidemia (high LDL; low HDL)     Sleep apnea 02/28/2014    Abnormal mammogram 01/15/2014    Histor of ovarian cancer 09/22/2012    Carcinomatosis ([address] 09/22/2012    Controlled type 2 diabetes mellitus without complication, with long-term current use of insulin ([address] 09/22/2012    Morbid obesity ([address] 09/22/2012      Past Surgical History:   Procedure Laterality Date    BREAST SURGERY PROCEDURE UNLISTED      Lymph node in left breast jan 2017   [facility] CHEST SURGERY PROCEDURE UNLISTED      Placement of port a cath right chest    HX GYN       X2    HX HYSTERECTOMY  2012    ROULA/BSO, tumor debulking, omentectomy for ovarian cancer    HX ORTHOPAEDIC      ankle-FX, R    HX ORTHOPAEDIC      FX R ARM    HX UROLOGICAL      stent    HX VASCULAR ACCESS  2015    right chest- port placed     Allergies   Allergen Reactions    Carboplatin Other (comments)     Patient developed shortness of breath, felt faint, coughing, skin flushed and \"itchy\". This occurred on the patients 8th treatment.  Iodinated Contrast Media - Oral And Iv Dye Rash    Lipitor [Atorvastatin] Myalgia    Percocet [Oxycodone-Acetaminophen] Other (comments)     fever    Shellfish Containing Products Hives    Tape [Adhesive] Itching and Other (comments)     \"op site-- clear,thin tape\"--caused blister       Family History   Problem Relation Age of Onset    Hypertension Mother     Arthritis-osteo Mother     Lung Disease Father      COPD    Hypertension Father     Arthritis-osteo Father     Hypertension Brother     Elevated Lipids Brother     Arthritis-osteo Brother     Hypertension Brother     Elevated Lipids Brother     Obesity Brother     Cancer Brother      PROSTATE    Anesth Problems Son      DELAYED AWAKENING    Diabetes Maternal Grandmother       Social History     Social History    Marital status:      Spouse name: N/A    Number of children: N/A    Years of education: N/A     Occupational History    Not on file. Social History Main Topics    Smoking status: Never Smoker    Smokeless tobacco: Never Used    Alcohol use No    Drug use: Yes     Special: Prescription, OTC    Sexual activity: Yes     Other Topics Concern    Not on file     Social History Narrative    Lives in [facility] alone.  passed away in  of a heart attack. Has 2 sons. Disability. 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 Enjoys swimming and going to the gym. Current Outpatient Prescriptions   Medication Sig    cycloSPORINE (RESTASIS) 0.05 % ophthalmic emulsion 1 Drop two (2) times a day.  OTHER Vitamin called easy promise takes two after dinner    multivitamin (ONE A DAY) tablet Take 2 Tabs by mouth daily (after dinner).  HYDROcodone-acetaminophen (NORCO) 5-325 mg per tablet Take 1 Tab by mouth every four (4) hours as needed for Pain. Max Daily Amount: 6 Tabs.  sertraline (ZOLOFT) 50 mg tablet TAKE 1 TABLET BY MOUTH EVERY MORNING    clonazePAM (KLONOPIN) 1 mg tablet TAKE 1 TABLET BY MOUTH TWICE A DAY    methocarbamol (ROBAXIN) 500 mg tablet Take  by mouth three (3) times daily.  ondansetron (ZOFRAN ODT) 8 mg disintegrating tablet Take 1 Tab by mouth every eight (8) hours as needed for Nausea.  valsartan (DIOVAN) 160 mg tablet TAKE 1 TAB BY MOUTH DAILY. REPLACES VALSARTAN-HCTZ    LEVEMIR FLEXTOUCH 100 unit/mL (3 mL) inpn 30 UNITS IN THE MORNING AND 50 UNITS AT NIGHT. INCREASE AS DIRECTED TO MAX  UNITS PER DAY    montelukast (SINGULAIR) 10 mg tablet Take 1 Tab by mouth daily.  levothyroxine (SYNTHROID) 150 mcg tablet TAKE 1 TAB BY MOUTH DAILY (BEFORE BREAKFAST).  Lancets misc Use as directed to test blood sugar three times daily-DX-DM-250.00    fluticasone-salmeterol (ADVAIR HFA) 230-21 mcg/actuation inhaler Take 2 Puffs by inhalation two (2) times a day.  BD INSULIN PEN NEEDLE UF SHORT 31 gauge x 5/16\" ndle USE WITH INSULIN TO INJECT FIVE TIMES DAILY.  albuterol (PROVENTIL VENTOLIN) 2.5 mg /3 mL (0.083 %) nebulizer solution 3 mL by Nebulization route every four (4) hours as needed for Wheezing.  Liraglutide (VICTOZA) 0.6 mg/0.1 mL (18 mg/3 mL) sub-q pen 0.3 mL by SubCUTAneous route daily (with lunch).  NOVOLOG FLEXPEN 100 unit/mL inpn INJECT 20 UNITS BEFORE EACH MEAL + 4 UNITS FOR EVERY 50 MG/DL ABOVE 150 MG/DL.   UNITS PER DAY    potassium chloride SR (KLOR-CON 10) 10 mEq tablet Take 1 Tab by mouth two (2) days a week.    cholecalciferol, VITAMIN D3, (VITAMIN D3) 5,000 unit tab tablet Take  by mouth daily.  furosemide (LASIX) 40 mg tablet Take 80 mg by mouth as needed.  Blood-Glucose Meter monitoring kit Use as directed to test blood sugar three times daily-DX-DM-250.00    albuterol (PROVENTIL HFA, VENTOLIN HFA, PROAIR HFA) 90 mcg/actuation inhaler Take 2 Puffs by inhalation every four (4) hours as needed for Wheezing.  fexofenadine (ALLEGRA) 180 mg tablet Take 180 mg by mouth daily.  mometasone (NASONEX) 50 mcg/actuation nasal spray 2 sprays by Both Nostrils route as needed. No current facility-administered medications for this visit. I have reviewed the nurses notes, vitals, problem list, allergy list, medical history, family medical, social history and medications. Objective:     Physical Exam:     Vitals:    03/28/17 0910   BP: 147/77   Pulse: 79   SpO2: 96%   Weight: 332 lb (150.6 kg)   Height: 5' 9\" (1.753 m)    Body mass index is 49.03 kg/(m^2). General: WDWN obese female, in no acute distress. Very pleasant. HEENT: No carotid bruits, no JVD, trach is midline. Heart:  Normal S1/S2 negative S3 or S4. Regular, no murmur, gallop or rub.   Respiratory: Clear bilaterally, no wheezing or rales  Abdomen:   Soft, non-tender, bowel sounds are active.   Extremities:  +diffuse ankle edema L>R, normal cap refill, no cyanosis. Neuro: A&Ox3, speech clear, gait stable. Skin: Skin color is normal. No rashes or lesions. No diaphoresis.   Vascular: 2+ pulses symmetric in all extremities      Data Review:       Cardiographics:    EKG: none today    Cardiology Labs:    Results for orders placed or performed during the hospital encounter of 03/12/17   EKG, 12 LEAD, INITIAL   Result Value Ref Range    Ventricular Rate 73 BPM    Atrial Rate 73 BPM    P-R Interval 140 ms    QRS Duration 78 ms    Q-T Interval 380 ms    QTC Calculation (Bezet) 418 ms    Calculated P Axis 72 degrees    Calculated R Axis 3 degrees    Calculated T Axis 18 degrees    Diagnosis       Normal sinus rhythm  Normal ECG  When compared with ECG of 2017 16:28,  No significant change was found  Confirmed by Radha Marines ([id number]) on 3/13/2017 8:59:05 AM         Lab Results   Component Value Date/Time    Cholesterol, total 241 2017 11:39 AM    HDL Cholesterol 34 2017 11:39 AM    LDL, calculated 169 2017 11:39 AM    Triglyceride 189 2017 11:39 AM       Lab Results   Component Value Date/Time    Sodium 143 2017 03:09 PM    Potassium 4.0 2017 03:09 PM    Chloride 110 2017 03:09 PM    CO2 22 2017 03:09 PM    Anion gap 11 2017 03:09 PM    Glucose 140 2017 03:09 PM    BUN 22 2017 03:09 PM    Creatinine 1.35 2017 03:09 PM    BUN/Creatinine ratio 16 2017 03:09 PM    GFR est AA 49 2017 03:09 PM    GFR est non-AA 41 2017 03:09 PM    Calcium 8.9 2017 03:09 PM    Bilirubin, total 0.4 2017 03:09 PM    AST (SGOT) 30 2017 03:09 PM    Alk. phosphatase 88 2017 03:09 PM    Protein, total 8.5 2017 03:09 PM    Albumin 2.9 2017 03:09 PM    Globulin 5.6 2017 03:09 PM    A-G Ratio 0.5 2017 03:09 PM    ALT (SGPT) 23 2017 03:09 PM      Transthoracic Echocardiogram 2017    Patient: Deep Trinh  MRN: [id number]  ACCT #: [de-identified]  : 1960  Age: 55 years  Gender: Female  Height: 69 in  Weight: 339.2 lb  BSA: 2.59 m squared  BP: / mmHg  Study date: 2017  Status: Routine  Location: Echo lab  [address] #: [id number]    Allergies: CARBOPLATIN, IODINATED CONTRAST MEDIA - ORAL AND IV DYE,  ATORVASTATIN, OXYCODONE-ACETAMINOPHEN, SHELLFISH CONTAINING PRODUCTS    Technician: NASIR Anderson,CCT  Ordering Physician: Edi Sandhu.  Bradly Rocha MD  Reading Group: Mark SONG  Reading Physician: Christy Burrell MD    SUMMARY:  Left ventricle: Size was at the upper limits of normal. Systolic function  was normal. Ejection fraction was estimated to be 60 %. There were no  regional wall motion abnormalities. There was mild concentric hypertrophy. Right ventricle: The size was at the upper limits of normal.    Left atrium: The atrium was mildly dilated. Right atrium: The atrium was mildly dilated. Tricuspid valve: There was mild regurgitation. There was mild pulmonary  hypertension. INDICATIONS: Chest pain. PROCEDURE: This was a routine study. The study included complete 2D  imaging, M-mode, complete spectral Doppler, and color Doppler. Image  quality was adequate. LEFT VENTRICLE: Size was at the upper limits of normal. Systolic function  was normal. Ejection fraction was estimated to be 60 %. There were no  regional wall motion abnormalities. There was mild concentric hypertrophy. RIGHT VENTRICLE: The size was at the upper limits of normal. Systolic  function was normal.    LEFT ATRIUM: The atrium was mildly dilated. RIGHT ATRIUM: The atrium was mildly dilated. MITRAL VALVE: Normal valve structure. There was normal leaflet separation. DOPPLER: The transmitral velocity was within the normal range. There was  no evidence for stenosis. There was trivial regurgitation. AORTIC VALVE: The valve was trileaflet. Leaflets exhibited normal  thickness and normal cuspal separation. DOPPLER: Transaortic velocity was  within the normal range. There was no stenosis. There was no regurgitation. TRICUSPID VALVE: Normal valve structure. There was normal leaflet  separation. DOPPLER: The transtricuspid velocity was within the normal  range. There was no evidence for tricuspid stenosis. There was mild  regurgitation. Pulmonary artery systolic pressure: 40 mmHg. There was mild  pulmonary hypertension. PULMONIC VALVE: Leaflets exhibited normal thickness, no calcification, and  normal cuspal separation. DOPPLER: The transpulmonic velocity was within  the normal range.  There was no regurgitation. AORTA: The root exhibited normal size. PERICARDIUM: Insignificant pericardial effusion and/or pericardial fat was  present. SYSTEM MEASUREMENT TABLES    2D  RA Diam: 4 cm  RVIDd: 3.6 cm    CW  AV Vmax: 2 m/s  AV maxP.1 mmHg  MR Vmax: 1.5 m/s  MR maxP.6 mmHg  RAP: 10 mmHg  TR Vmax: 2.7 m/s  TR maxP.9 mmHg    MM  AV Cusp: 1.8 cm  Ao Diam: 3.3 cm  LA Diam: 4.6 cm  LA/Ao: 1.4  %FS: 39.5 %  EDV(Teich): 128.6 ml  EF(Teich): 69.7 %  ESV(Teich): 39 ml  IVSd: 1.3 cm  LVIDd: 5.2 cm  LVIDs: 3.1 cm  LVPWd: 1.3 cm  SV(Teich): 89.7 ml    PW  LVOT maxP.7 mmHg  LVOT Vmax: 1.1 m/s  A': 0.1 m/s  E': 0.1 m/s  E/E': 7.6  MV A Bryan: 0.9 m/s  MV Dec F[place]: 2.9 m/s2  MV DecT: 308.8 ms  MV E Bryan: 0.9 m/s  MV E/A Ratio: 1  MV PHT: 89.5 ms  MVA By PHT: 2.5 cm2  PV Vmax: 1 m/s  PV maxP.1 mmHg  RVSP: 39.9 mmHg    Prepared and E-signed by    Donn Elias MD  Signed [id number] 15:09:58    NM Stress 3/1/2017  INDICATION: Chest pain, acute coronary syndrome suspect; ANGINA Encounter for  preprocedural cardiovascular examination      EXAM: Nuclear Myocardial Perfusion SPECT and Gated Wall Motion Imaging.      Rest and Lexiscan myocardial perfusion SPECT imaging is performed with IV  injections of 30 and 30 mCi technetium 99m Sestamibi respectively.     SPECT images displayed in three planes show uniform radiotracer uptake in the  myocardium on both sequences. There is no ischemic reversibility. There is no  apparent infarct fixed defect.      Gated SPECT images reviewed in 3 planes show appropriate LV systolic wall  thickening. There is no segmental wall motion abnormality of the left  ventricular myocardium. The calculated LV ejection fraction is 53%. Pulmonary  uptake and left ventricular cavity size appear normal.        IMPRESSION  IMPRESSION: No ischemia demonstrated. No infarct visible. LVEF 53%. Assessment:       ICD-10-CM ICD-9-CM    1. Benign essential hypertension I10 401.1    2.  Angina pectoris associated with type 2 diabetes mellitus (HCC) E11.59 250.80     I20.9 413.9    3. Histor of ovarian cancer Z85.43 V10.43    4. Pulmonary hypertension, mild (HCC) I27.2 416.8          Discussion: Patient presents at this time stable from a cardiac perspective. Discussed echo results. Pt has h/o lung disease (asthma) and sleep apnea which is likely contributing to mild pulmonary HTN (40mmHg). Advised discussing with her pulmonologist Dr. Michael Chicas, to make her aware of this for further surveillance. Have provided pt with copy of Echo report. Pleased with present status. Plan: 1. Continue same meds. Lipid profile and labs followed by PCP. 2.Encouraged to exercise to tolerance, lose weight and follow low fat, low cholesterol, low sodium predominantly Plant-based (consider Mediterranean) diet. Call with questions or concerns. Will follow up any test results by phone and/or f/u here in office if needed. Tono Maldonado 3.Follow up: 6 months    I have discussed the diagnosis with the patient and the intended plan as seen in the above orders. The patient has received an after-visit summary and questions were answered concerning future plans. I have discussed any concerning medication side effects and warnings with the patient as well. Patient seen and examined. All pertinent data reviewed. I have reviewed detailed note as outlined by Rika Rabago. Case discussed with Nursing/medical assistant staff and Rika Rabago. Patient's cardiac status stable. Pulmonary status stable. Repeat echo 1-2 yrs. Reassured. No chemoRx induced CM. Plans as outlined.       Simran Vee PA-C  3/28/2017     MARIA D Diane

## 2017-03-28 NOTE — PATIENT INSTRUCTIONS
-- 6 month follow up      Pulmonary Hypertension: Care Instructions  Your Care Instructions  Pulmonary hypertension is high blood pressure in the arteries of your lungs. These blood vessels carry blood from the heart to the lungs, where the blood picks up oxygen. The walls of the arteries may get thick, and the arteries may get narrow. When this happens, blood does not flow as well as it should. Pressure builds up in the arteries. Then your heart has to work harder to pump blood through your lungs. There are different types of pulmonary hypertension. They are caused by different things. Causes include other health conditions such as heart or lung problems. Sometimes it can happen without a known cause. When you have this condition, your body gets less oxygen from your blood. This causes symptoms such as shortness of breath and feeling tired, faint, or dizzy. Over time, these symptoms may change or get worse if your heart gets weaker. You may get heart failure. Heart failure means your heart doesn't pump as much blood as your body needs. Treatment can help you feel better and live longer. Your treatment options will depend on the type of pulmonary hypertension you have. It can be hard to learn that you have a problem with your lungs and heart. But there are things you can do to feel better and stay as active as you can. Follow-up care is a key part of your treatment and safety. Be sure to make and go to all appointments, and call your doctor if you are having problems. It's also a good idea to know your test results and keep a list of the medicines you take. How can you care for yourself at home? Medicine  · Be safe with medicines. Take your medicines exactly as prescribed. Call your doctor if you think you are having a problem with your medicine. You will get more details on the specific medicines your doctor prescribes. · Your doctor may prescribe oxygen therapy.  You will get more details on how to use it.  · Talk to your doctor before you take any vitamins, over-the-counter medicine, or herbal products. Don't take ibuprofen (Advil or Motrin) and naproxen (Aleve) without talking to your doctor first.  · If you take a blood thinner, be sure to get instructions about how to take your medicine safely. Blood thinners can cause serious bleeding problems. Activity  · Be as active as you can. Talk to your doctor about making a plan before you start a new activity. · Ask your doctor if a pulmonary rehabilitation program is right for you. · Learn how to save your energy. Making small changes in daily activities can make a big impact on how you feel. Staying healthy  · Eat healthy foods, and try to stay at a healthy weight. · Do not smoke. Smoking can make this condition worse. If you need help quitting, talk to your doctor about stop-smoking programs and medicines. These can increase your chances of quitting for good. · Talk to your doctor about preventing pregnancy. You may need to take steps to avoid becoming pregnant. Pregnancy and childbirth can cause changes in the body that could be life-threatening for women who have this condition. · Avoid colds and flu. Get a pneumococcal vaccine shot. If you have had one before, ask your doctor if you need another dose. Get a flu vaccine every year. If you must be around people with colds or flu, wash your hands often. When should you call for help? Call 911 anytime you think you may need emergency care. For example, call if:  · You have symptoms of sudden heart failure. These may include:  ¨ Severe trouble breathing. ¨ A fast or irregular heartbeat. ¨ Coughing up pink, foamy mucus. ¨ Passing out. Call your doctor now or seek immediate medical care if:  · You have new or changed symptoms of heart failure, such as:  ¨ New or increased shortness of breath. ¨ New or worse swelling in your legs, ankles, or feet.   ¨ Sudden weight gain, such as 3 pounds or more in 2 to 3 days. (Your doctor may suggest a different range of weight gain.)  ¨ Feeling dizzy or lightheaded or like you may faint. ¨ Feeling so tired or weak that you cannot do your usual activities. ¨ Not sleeping well. Shortness of breath wakes you at night. You need extra pillows to prop yourself up to breathe easier. Watch closely for changes in your health, and be sure to contact your doctor if:  · You have new or worse symptoms. Where can you learn more? Go to http://shani-jasmin.info/. Enter T505 in the search box to learn more about \"Pulmonary Hypertension: Care Instructions. \"  Current as of: January 27, 2016  Content Version: 11.1  © 0482-2826 [employer]. Care instructions adapted under license by [employer] (which disclaims liability or warranty for this information). If you have questions about a medical condition or this instruction, always ask your healthcare professional. [place] [address] any warranty or liability for your use of this information.

## 2017-03-30 ENCOUNTER — APPOINTMENT (OUTPATIENT)
Dept: INFUSION THERAPY | Age: 57
End: 2017-03-30
Payer: MEDICARE

## 2017-04-03 ENCOUNTER — HOSPITAL ENCOUNTER (INPATIENT)
Age: 57
LOS: 3 days | Discharge: HOME | DRG: 872 | End: 2017-04-06
Attending: EMERGENCY MEDICINE | Admitting: INTERNAL MEDICINE
Payer: MEDICARE

## 2017-04-03 ENCOUNTER — APPOINTMENT (OUTPATIENT)
Dept: CT IMAGING | Age: 57
DRG: 872 | End: 2017-04-03
Attending: EMERGENCY MEDICINE
Payer: MEDICARE

## 2017-04-03 ENCOUNTER — APPOINTMENT (OUTPATIENT)
Dept: INFUSION THERAPY | Age: 57
End: 2017-04-03
Payer: MEDICARE

## 2017-04-03 ENCOUNTER — APPOINTMENT (OUTPATIENT)
Dept: GENERAL RADIOLOGY | Age: 57
DRG: 872 | End: 2017-04-03
Attending: EMERGENCY MEDICINE
Payer: MEDICARE

## 2017-04-03 ENCOUNTER — TELEPHONE (OUTPATIENT)
Dept: GYNECOLOGY | Age: 57
End: 2017-04-03

## 2017-04-03 ENCOUNTER — APPOINTMENT (OUTPATIENT)
Dept: NUCLEAR MEDICINE | Age: 57
DRG: 872 | End: 2017-04-03
Attending: EMERGENCY MEDICINE
Payer: MEDICARE

## 2017-04-03 DIAGNOSIS — N30.01 ACUTE CYSTITIS WITH HEMATURIA: Primary | ICD-10-CM

## 2017-04-03 DIAGNOSIS — N17.9 ACUTE RENAL FAILURE, UNSPECIFIED ACUTE RENAL FAILURE TYPE (HCC): ICD-10-CM

## 2017-04-03 DIAGNOSIS — I27.20 PULMONARY HYPERTENSION, MILD (HCC): ICD-10-CM

## 2017-04-03 PROBLEM — N39.0 UTI (URINARY TRACT INFECTION): Status: ACTIVE | Noted: 2017-04-03

## 2017-04-03 LAB
ALBUMIN SERPL BCP-MCNC: 2.9 G/DL (ref 3.5–5)
ALBUMIN/GLOB SERPL: 0.5 {RATIO} (ref 1.1–2.2)
ALP SERPL-CCNC: 87 U/L (ref 45–117)
ALT SERPL-CCNC: 25 U/L (ref 12–78)
ANION GAP BLD CALC-SCNC: 13 MMOL/L (ref 5–15)
APPEARANCE UR: ABNORMAL
AST SERPL W P-5'-P-CCNC: 36 U/L (ref 15–37)
ATRIAL RATE: 79 BPM
BACTERIA URNS QL MICRO: ABNORMAL /HPF
BASOPHILS # BLD AUTO: 0 K/UL (ref 0–0.1)
BASOPHILS # BLD: 0 % (ref 0–1)
BILIRUB SERPL-MCNC: 0.8 MG/DL (ref 0.2–1)
BILIRUB UR QL CFM: NEGATIVE
BNP SERPL-MCNC: 606 PG/ML (ref 0–125)
BUN SERPL-MCNC: 21 MG/DL (ref 6–20)
BUN/CREAT SERPL: 11 (ref 12–20)
CALCIUM SERPL-MCNC: 8.4 MG/DL (ref 8.5–10.1)
CALCULATED P AXIS, ECG09: 13 DEGREES
CALCULATED R AXIS, ECG10: -3 DEGREES
CALCULATED T AXIS, ECG11: 19 DEGREES
CHLORIDE SERPL-SCNC: 103 MMOL/L (ref 97–108)
CO2 SERPL-SCNC: 20 MMOL/L (ref 21–32)
COLOR UR: ABNORMAL
CREAT SERPL-MCNC: 1.88 MG/DL (ref 0.55–1.02)
D DIMER PPP FEU-MCNC: 8.17 MG/L FEU (ref 0–0.65)
DIAGNOSIS, 93000: NORMAL
EOSINOPHIL # BLD: 0 K/UL (ref 0–0.4)
EOSINOPHIL NFR BLD: 0 % (ref 0–7)
EPITH CASTS URNS QL MICRO: ABNORMAL /LPF
ERYTHROCYTE [DISTWIDTH] IN BLOOD BY AUTOMATED COUNT: 14.9 % (ref 11.5–14.5)
EST. AVERAGE GLUCOSE BLD GHB EST-MCNC: 126 MG/DL
GLOBULIN SER CALC-MCNC: 6.1 G/DL (ref 2–4)
GLUCOSE BLD STRIP.AUTO-MCNC: 115 MG/DL (ref 65–100)
GLUCOSE BLD STRIP.AUTO-MCNC: 147 MG/DL (ref 65–100)
GLUCOSE SERPL-MCNC: 175 MG/DL (ref 65–100)
GLUCOSE UR STRIP.AUTO-MCNC: NEGATIVE MG/DL
HBA1C MFR BLD: 6 % (ref 4.2–6.3)
HCT VFR BLD AUTO: 28.7 % (ref 35–47)
HGB BLD-MCNC: 9 G/DL (ref 11.5–16)
HGB UR QL STRIP: ABNORMAL
INR PPP: 1.1 (ref 0.9–1.1)
KETONES UR QL STRIP.AUTO: ABNORMAL MG/DL
LACTATE SERPL-SCNC: 1.2 MMOL/L (ref 0.4–2)
LEUKOCYTE ESTERASE UR QL STRIP.AUTO: ABNORMAL
LYMPHOCYTES # BLD AUTO: 11 % (ref 12–49)
LYMPHOCYTES # BLD: 1.6 K/UL (ref 0.8–3.5)
MAGNESIUM SERPL-MCNC: 1.6 MG/DL (ref 1.6–2.4)
MCH RBC QN AUTO: 28.1 PG (ref 26–34)
MCHC RBC AUTO-ENTMCNC: 31.4 G/DL (ref 30–36.5)
MCV RBC AUTO: 89.7 FL (ref 80–99)
MONOCYTES # BLD: 1.8 K/UL (ref 0–1)
MONOCYTES NFR BLD AUTO: 12 % (ref 5–13)
MUCOUS THREADS URNS QL MICRO: ABNORMAL /LPF
NEUTS SEG # BLD: 11.9 K/UL (ref 1.8–8)
NEUTS SEG NFR BLD AUTO: 77 % (ref 32–75)
NITRITE UR QL STRIP.AUTO: NEGATIVE
P-R INTERVAL, ECG05: 110 MS
PH UR STRIP: 5.5 [PH] (ref 5–8)
PLATELET # BLD AUTO: 163 K/UL (ref 150–400)
POTASSIUM SERPL-SCNC: 4.3 MMOL/L (ref 3.5–5.1)
PROT SERPL-MCNC: 9 G/DL (ref 6.4–8.2)
PROT UR STRIP-MCNC: 100 MG/DL
PROTHROMBIN TIME: 11.1 SEC (ref 9–11.1)
Q-T INTERVAL, ECG07: 360 MS
QRS DURATION, ECG06: 74 MS
QTC CALCULATION (BEZET), ECG08: 412 MS
RBC # BLD AUTO: 3.2 M/UL (ref 3.8–5.2)
RBC #/AREA URNS HPF: ABNORMAL /HPF (ref 0–5)
SERVICE CMNT-IMP: ABNORMAL
SERVICE CMNT-IMP: ABNORMAL
SODIUM SERPL-SCNC: 136 MMOL/L (ref 136–145)
SP GR UR REFRACTOMETRY: 1.02 (ref 1–1.03)
TROPONIN I SERPL-MCNC: <0.04 NG/ML
UROBILINOGEN UR QL STRIP.AUTO: 1 EU/DL (ref 0.2–1)
VENTRICULAR RATE, ECG03: 79 BPM
WBC # BLD AUTO: 15.3 K/UL (ref 3.6–11)
WBC URNS QL MICRO: >100 /HPF (ref 0–4)

## 2017-04-03 PROCEDURE — 36415 COLL VENOUS BLD VENIPUNCTURE: CPT | Performed by: EMERGENCY MEDICINE

## 2017-04-03 PROCEDURE — 74011636637 HC RX REV CODE- 636/637: Performed by: INTERNAL MEDICINE

## 2017-04-03 PROCEDURE — 87077 CULTURE AEROBIC IDENTIFY: CPT | Performed by: EMERGENCY MEDICINE

## 2017-04-03 PROCEDURE — 96365 THER/PROPH/DIAG IV INF INIT: CPT

## 2017-04-03 PROCEDURE — 65660000000 HC RM CCU STEPDOWN

## 2017-04-03 PROCEDURE — 83036 HEMOGLOBIN GLYCOSYLATED A1C: CPT | Performed by: INTERNAL MEDICINE

## 2017-04-03 PROCEDURE — 83605 ASSAY OF LACTIC ACID: CPT | Performed by: EMERGENCY MEDICINE

## 2017-04-03 PROCEDURE — 74011250636 HC RX REV CODE- 250/636: Performed by: INTERNAL MEDICINE

## 2017-04-03 PROCEDURE — 99284 EMERGENCY DEPT VISIT MOD MDM: CPT

## 2017-04-03 PROCEDURE — 71010 XR CHEST PORT: CPT

## 2017-04-03 PROCEDURE — 84484 ASSAY OF TROPONIN QUANT: CPT | Performed by: EMERGENCY MEDICINE

## 2017-04-03 PROCEDURE — 83735 ASSAY OF MAGNESIUM: CPT | Performed by: EMERGENCY MEDICINE

## 2017-04-03 PROCEDURE — 87040 BLOOD CULTURE FOR BACTERIA: CPT | Performed by: EMERGENCY MEDICINE

## 2017-04-03 PROCEDURE — 82962 GLUCOSE BLOOD TEST: CPT

## 2017-04-03 PROCEDURE — 87186 SC STD MICRODIL/AGAR DIL: CPT | Performed by: EMERGENCY MEDICINE

## 2017-04-03 PROCEDURE — 85379 FIBRIN DEGRADATION QUANT: CPT | Performed by: EMERGENCY MEDICINE

## 2017-04-03 PROCEDURE — 85610 PROTHROMBIN TIME: CPT | Performed by: EMERGENCY MEDICINE

## 2017-04-03 PROCEDURE — 83880 ASSAY OF NATRIURETIC PEPTIDE: CPT | Performed by: INTERNAL MEDICINE

## 2017-04-03 PROCEDURE — 87086 URINE CULTURE/COLONY COUNT: CPT | Performed by: EMERGENCY MEDICINE

## 2017-04-03 PROCEDURE — 85025 COMPLETE CBC W/AUTO DIFF WBC: CPT | Performed by: EMERGENCY MEDICINE

## 2017-04-03 PROCEDURE — 74011250636 HC RX REV CODE- 250/636: Performed by: EMERGENCY MEDICINE

## 2017-04-03 PROCEDURE — 74011250637 HC RX REV CODE- 250/637: Performed by: INTERNAL MEDICINE

## 2017-04-03 PROCEDURE — 80053 COMPREHEN METABOLIC PANEL: CPT | Performed by: EMERGENCY MEDICINE

## 2017-04-03 PROCEDURE — 96361 HYDRATE IV INFUSION ADD-ON: CPT

## 2017-04-03 PROCEDURE — 93005 ELECTROCARDIOGRAM TRACING: CPT

## 2017-04-03 PROCEDURE — 81001 URINALYSIS AUTO W/SCOPE: CPT | Performed by: EMERGENCY MEDICINE

## 2017-04-03 PROCEDURE — 74011000258 HC RX REV CODE- 258: Performed by: EMERGENCY MEDICINE

## 2017-04-03 PROCEDURE — 74011000250 HC RX REV CODE- 250: Performed by: INTERNAL MEDICINE

## 2017-04-03 PROCEDURE — A9558 XE133 XENON 10MCI: HCPCS

## 2017-04-03 RX ORDER — SODIUM CHLORIDE 0.9 % (FLUSH) 0.9 %
5-10 SYRINGE (ML) INJECTION EVERY 8 HOURS
Status: DISCONTINUED | OUTPATIENT
Start: 2017-04-03 | End: 2017-04-06 | Stop reason: HOSPADM

## 2017-04-03 RX ORDER — SODIUM CHLORIDE 0.9 % (FLUSH) 0.9 %
5-10 SYRINGE (ML) INJECTION AS NEEDED
Status: DISCONTINUED | OUTPATIENT
Start: 2017-04-03 | End: 2017-04-05

## 2017-04-03 RX ORDER — SODIUM CHLORIDE 0.9 % (FLUSH) 0.9 %
5-10 SYRINGE (ML) INJECTION EVERY 8 HOURS
Status: DISCONTINUED | OUTPATIENT
Start: 2017-04-03 | End: 2017-04-05

## 2017-04-03 RX ORDER — INSULIN LISPRO 100 [IU]/ML
INJECTION, SOLUTION INTRAVENOUS; SUBCUTANEOUS
Status: DISCONTINUED | OUTPATIENT
Start: 2017-04-03 | End: 2017-04-06 | Stop reason: HOSPADM

## 2017-04-03 RX ORDER — FLUTICASONE FUROATE AND VILANTEROL 200; 25 UG/1; UG/1
1 POWDER RESPIRATORY (INHALATION) DAILY
Status: DISCONTINUED | OUTPATIENT
Start: 2017-04-04 | End: 2017-04-06 | Stop reason: HOSPADM

## 2017-04-03 RX ORDER — DEXTROSE 50 % IN WATER (D50W) INTRAVENOUS SYRINGE
12.5-25 AS NEEDED
Status: DISCONTINUED | OUTPATIENT
Start: 2017-04-03 | End: 2017-04-06 | Stop reason: HOSPADM

## 2017-04-03 RX ORDER — MAGNESIUM SULFATE 100 %
4 CRYSTALS MISCELLANEOUS AS NEEDED
Status: DISCONTINUED | OUTPATIENT
Start: 2017-04-03 | End: 2017-04-06 | Stop reason: HOSPADM

## 2017-04-03 RX ORDER — CLONAZEPAM 1 MG/1
1 TABLET ORAL 2 TIMES DAILY
Status: DISCONTINUED | OUTPATIENT
Start: 2017-04-03 | End: 2017-04-06 | Stop reason: HOSPADM

## 2017-04-03 RX ORDER — HYDROCODONE BITARTRATE AND ACETAMINOPHEN 5; 325 MG/1; MG/1
1 TABLET ORAL
Status: DISCONTINUED | OUTPATIENT
Start: 2017-04-03 | End: 2017-04-06 | Stop reason: HOSPADM

## 2017-04-03 RX ORDER — ONDANSETRON 2 MG/ML
4 INJECTION INTRAMUSCULAR; INTRAVENOUS
Status: DISCONTINUED | OUTPATIENT
Start: 2017-04-03 | End: 2017-04-06 | Stop reason: HOSPADM

## 2017-04-03 RX ORDER — HEPARIN SODIUM 5000 [USP'U]/ML
5000 INJECTION, SOLUTION INTRAVENOUS; SUBCUTANEOUS EVERY 8 HOURS
Status: DISCONTINUED | OUTPATIENT
Start: 2017-04-03 | End: 2017-04-06 | Stop reason: HOSPADM

## 2017-04-03 RX ORDER — BISACODYL 5 MG
5 TABLET, DELAYED RELEASE (ENTERIC COATED) ORAL DAILY PRN
Status: DISCONTINUED | OUTPATIENT
Start: 2017-04-03 | End: 2017-04-06 | Stop reason: HOSPADM

## 2017-04-03 RX ORDER — INSULIN GLARGINE 100 [IU]/ML
40 INJECTION, SOLUTION SUBCUTANEOUS
Status: DISCONTINUED | OUTPATIENT
Start: 2017-04-03 | End: 2017-04-06 | Stop reason: HOSPADM

## 2017-04-03 RX ORDER — ACETAMINOPHEN 325 MG/1
650 TABLET ORAL
Status: DISCONTINUED | OUTPATIENT
Start: 2017-04-03 | End: 2017-04-06 | Stop reason: HOSPADM

## 2017-04-03 RX ORDER — CYCLOSPORINE 0.5 MG/ML
1 EMULSION OPHTHALMIC 2 TIMES DAILY
Status: DISCONTINUED | OUTPATIENT
Start: 2017-04-03 | End: 2017-04-06 | Stop reason: HOSPADM

## 2017-04-03 RX ORDER — METHOCARBAMOL 500 MG/1
500 TABLET, FILM COATED ORAL 3 TIMES DAILY
Status: DISCONTINUED | OUTPATIENT
Start: 2017-04-03 | End: 2017-04-06 | Stop reason: HOSPADM

## 2017-04-03 RX ORDER — ALBUTEROL SULFATE 0.83 MG/ML
2.5 SOLUTION RESPIRATORY (INHALATION)
Status: DISCONTINUED | OUTPATIENT
Start: 2017-04-03 | End: 2017-04-06 | Stop reason: HOSPADM

## 2017-04-03 RX ORDER — SERTRALINE HYDROCHLORIDE 50 MG/1
50 TABLET, FILM COATED ORAL EVERY EVENING
Status: DISCONTINUED | OUTPATIENT
Start: 2017-04-03 | End: 2017-04-06 | Stop reason: HOSPADM

## 2017-04-03 RX ORDER — INSULIN GLARGINE 100 [IU]/ML
20 INJECTION, SOLUTION SUBCUTANEOUS
Status: DISCONTINUED | OUTPATIENT
Start: 2017-04-04 | End: 2017-04-06 | Stop reason: HOSPADM

## 2017-04-03 RX ORDER — LEVOTHYROXINE SODIUM 150 UG/1
150 TABLET ORAL
Status: DISCONTINUED | OUTPATIENT
Start: 2017-04-04 | End: 2017-04-06 | Stop reason: HOSPADM

## 2017-04-03 RX ORDER — SODIUM CHLORIDE 9 MG/ML
50 INJECTION, SOLUTION INTRAVENOUS CONTINUOUS
Status: DISCONTINUED | OUTPATIENT
Start: 2017-04-03 | End: 2017-04-04

## 2017-04-03 RX ORDER — SODIUM CHLORIDE 0.9 % (FLUSH) 0.9 %
5-10 SYRINGE (ML) INJECTION AS NEEDED
Status: DISCONTINUED | OUTPATIENT
Start: 2017-04-03 | End: 2017-04-06 | Stop reason: HOSPADM

## 2017-04-03 RX ADMIN — INSULIN HUMAN 20 UNITS: 100 INJECTION, SUSPENSION SUBCUTANEOUS at 19:00

## 2017-04-03 RX ADMIN — CYCLOSPORINE 1 DROP: 0.5 EMULSION OPHTHALMIC at 22:54

## 2017-04-03 RX ADMIN — Medication 10 ML: at 19:01

## 2017-04-03 RX ADMIN — METHOCARBAMOL 500 MG: 500 TABLET ORAL at 19:00

## 2017-04-03 RX ADMIN — CLONAZEPAM 1 MG: 1 TABLET ORAL at 19:00

## 2017-04-03 RX ADMIN — CEFTRIAXONE 1 G: 1 INJECTION, POWDER, FOR SOLUTION INTRAMUSCULAR; INTRAVENOUS at 13:43

## 2017-04-03 RX ADMIN — INSULIN GLARGINE 40 UNITS: 100 INJECTION, SOLUTION SUBCUTANEOUS at 22:54

## 2017-04-03 RX ADMIN — INSULIN HUMAN 20 UNITS: 100 INJECTION, SUSPENSION SUBCUTANEOUS at 22:55

## 2017-04-03 RX ADMIN — Medication 10 ML: at 22:57

## 2017-04-03 RX ADMIN — SODIUM CHLORIDE 1000 ML: 900 INJECTION, SOLUTION INTRAVENOUS at 10:47

## 2017-04-03 RX ADMIN — HEPARIN SODIUM 5000 UNITS: 5000 INJECTION, SOLUTION INTRAVENOUS; SUBCUTANEOUS at 22:53

## 2017-04-03 RX ADMIN — SERTRALINE HYDROCHLORIDE 50 MG: 50 TABLET ORAL at 19:00

## 2017-04-03 RX ADMIN — METHOCARBAMOL 500 MG: 500 TABLET ORAL at 22:56

## 2017-04-03 RX ADMIN — SODIUM CHLORIDE 2000 ML: 900 INJECTION, SOLUTION INTRAVENOUS at 13:44

## 2017-04-03 RX ADMIN — SODIUM CHLORIDE 125 ML/HR: 900 INJECTION, SOLUTION INTRAVENOUS at 19:41

## 2017-04-03 NOTE — ED NOTES
Pt awake and up in bed at this time. Urine sent to lab. Will continue to monitor. Call light in reach.

## 2017-04-03 NOTE — IP AVS SNAPSHOT
Current Discharge Medication List  
  
START taking these medications Dose & Instructions Dispensing Information Comments Morning Noon Evening Bedtime  
 cefUROXime 500 mg tablet Commonly known as:  CEFTIN Your last dose was: Your next dose is:    
   
   
 Dose:  500 mg Take 1 Tab by mouth two (2) times a day for 12 days. Quantity:  24 Tab Refills:  0  
     
   
   
   
  
 nitrofurantoin 100 mg capsule Commonly known as:  MACRODANTIN Your last dose was: Your next dose is:    
   
   
 Dose:  100 mg Take 1 Cap by mouth two (2) times a day for 5 days. Quantity:  10 Cap Refills:  0 CONTINUE these medications which have NOT CHANGED Dose & Instructions Dispensing Information Comments Morning Noon Evening Bedtime * albuterol 90 mcg/actuation inhaler Commonly known as:  PROVENTIL HFA, VENTOLIN HFA, PROAIR HFA Your last dose was: Your next dose is:    
   
   
 Dose:  2 Puff Take 2 Puffs by inhalation every four (4) hours as needed for Wheezing. Refills:  0  
     
   
   
   
  
 * albuterol 2.5 mg /3 mL (0.083 %) nebulizer solution Commonly known as:  PROVENTIL VENTOLIN Your last dose was: Your next dose is:    
   
   
 Dose:  2.5 mg  
3 mL by Nebulization route every four (4) hours as needed for Wheezing. Quantity:  24 Each Refills:  11 ALLEGRA 180 mg tablet Generic drug:  fexofenadine Your last dose was: Your next dose is:    
   
   
 Dose:  180 mg Take 180 mg by mouth daily. Refills:  0 BD INSULIN PEN NEEDLE UF SHORT 31 gauge x 5/16\" Ndle Generic drug:  Insulin Needles (Disposable) Your last dose was: Your next dose is:    
   
   
 USE WITH INSULIN TO INJECT FIVE TIMES DAILY. Quantity:  200 Pen Needle Refills:  11 Blood-Glucose Meter monitoring kit Your last dose was: Your next dose is:    
   
   
 Use as directed to test blood sugar three times daily-DX-DM-250.00 Quantity:  1 Kit Refills:  0 Please provide brand covered by insurance  
    
   
   
   
  
 cholecalciferol (VITAMIN D3) 5,000 unit Tab tablet Commonly known as:  VITAMIN D3 Your last dose was: Your next dose is: Take  by mouth daily. Refills:  0  
     
   
   
   
  
 clonazePAM 1 mg tablet Commonly known as:  [place] Your last dose was: Your next dose is: TAKE 1 TABLET BY MOUTH TWICE A DAY Refills:  1  
     
   
   
   
  
 fluticasone-salmeterol 230-21 mcg/actuation inhaler Commonly known as:  ADVAIR HFA Your last dose was: Your next dose is:    
   
   
 Dose:  2 Puff Take 2 Puffs by inhalation two (2) times a day. Quantity:  12 g Refills:  5  
     
   
   
   
  
 furosemide 40 mg tablet Commonly known as:  LASIX Your last dose was: Your next dose is:    
   
   
 Dose:  80 mg Take 80 mg by mouth as needed. Refills:  3 HYDROcodone-acetaminophen 5-325 mg per tablet Commonly known as:  Ruba Osborn Your last dose was: Your next dose is:    
   
   
 Dose:  1 Tab Take 1 Tab by mouth every four (4) hours as needed for Pain. Max Daily Amount: 6 Tabs. Quantity:  20 Tab Refills:  0 Lancets Misc Your last dose was: Your next dose is:    
   
   
 Use as directed to test blood sugar three times daily-DX-DM-250.00 Quantity:  270 Each Refills:  3 Please provide brand covered by insurance LEVEMIR FLEXTOUCH 100 unit/mL (3 mL) Inpn Generic drug:  insulin detemir Your last dose was: Your next dose is:    
   
   
 30 UNITS IN THE MORNING AND 50 UNITS AT NIGHT. INCREASE AS DIRECTED TO MAX  UNITS PER DAY Quantity:  30 mL Refills:  11  
     
   
   
 levothyroxine 150 mcg tablet Commonly known as:  SYNTHROID Your last dose was: Your next dose is: TAKE 1 TAB BY MOUTH DAILY (BEFORE BREAKFAST). Quantity:  90 Tab Refills:  3 Liraglutide 0.6 mg/0.1 mL (18 mg/3 mL) sub-q pen Commonly known as:  [place]itts Your last dose was: Your next dose is:    
   
   
 Dose:  1.8 mg  
0.3 mL by SubCUTAneous route daily (with lunch). Quantity:  3 Each Refills:  11  
     
   
   
   
  
 methocarbamol 500 mg tablet Commonly known as:  ROBAXIN Your last dose was: Your next dose is: Take  by mouth three (3) times daily. Refills:  0  
     
   
   
   
  
 montelukast 10 mg tablet Commonly known as:  SINGULAIR Your last dose was: Your next dose is:    
   
   
 Dose:  10 mg Take 1 Tab by mouth daily. Quantity:  90 Tab Refills:  2  
     
   
   
   
  
 multivitamin tablet Commonly known as:  ONE A DAY Your last dose was: Your next dose is:    
   
   
 Dose:  2 Tab Take 2 Tabs by mouth daily (after dinner). Refills:  0  
     
   
   
   
  
 NASONEX 50 mcg/actuation nasal spray Generic drug:  mometasone Your last dose was: Your next dose is:    
   
   
 Dose:  2 Spray  
2 sprays by Both Nostrils route as needed. Refills:  0 NovoLOG Flexpen 100 unit/mL Inpn Generic drug:  insulin aspart Your last dose was: Your next dose is: INJECT 20 UNITS BEFORE EACH MEAL + 4 UNITS FOR EVERY 50 MG/DL ABOVE 150 MG/DL.  UNITS PER DAY Quantity:  30 mL Refills:  11  
     
   
   
   
  
 ondansetron 8 mg disintegrating tablet Commonly known as:  ZOFRAN ODT Your last dose was: Your next dose is:    
   
   
 Dose:  8 mg Take 1 Tab by mouth every eight (8) hours as needed for Nausea. Quantity:  30 Tab Refills:  1 OTHER Your last dose was: Your next dose is:    
   
   
 Vitamin called easy promise takes two after dinner Refills:  0  
     
   
   
   
  
 potassium chloride SR 10 mEq tablet Commonly known as:  KLOR-CON 10 Your last dose was: Your next dose is:    
   
   
 Dose:  10 mEq Take 1 Tab by mouth two (2) days a week. Quantity:  30 Tab Refills:  1 RESTASIS 0.05 % ophthalmic emulsion Generic drug:  cycloSPORINE Your last dose was: Your next dose is:    
   
   
 Dose:  1 Drop 1 Drop two (2) times a day. Refills:  0  
     
   
   
   
  
 sertraline 50 mg tablet Commonly known as:  ZOLOFT Your last dose was: Your next dose is: TAKE 1 TABLET BY MOUTH EVERY MORNING Refills:  1  
     
   
   
   
  
 valsartan 160 mg tablet Commonly known as:  DIOVAN Your last dose was: Your next dose is: TAKE 1 TAB BY MOUTH DAILY. REPLACES VALSARTAN-HCTZ Quantity:  30 Tab Refills:  11 * Notice: This list has 2 medication(s) that are the same as other medications prescribed for you. Read the directions carefully, and ask your doctor or other care provider to review them with you. Where to Get Your Medications Information on where to get these meds will be given to you by the nurse or doctor. ! Ask your nurse or doctor about these medications  
  cefUROXime 500 mg tablet  
 nitrofurantoin 100 mg capsule

## 2017-04-03 NOTE — ROUTINE PROCESS
Primary Nurse Shailesh Andrews RN and Luis Lopez RN performed a dual skin assessment on this patient No impairment noted  Delroy score is 23

## 2017-04-03 NOTE — TELEPHONE ENCOUNTER
Pt's son , he states the pt, his mom is pale and having shortness of breath. I spoke with Kailee Sullivan NP and I advised pt's son to have pt go to ER or to see cardiologist.  I also advised pt's son to call back later for an updated. Pt's son verbalized understanding.

## 2017-04-03 NOTE — ROUTINE PROCESS
TRANSFER - OUT REPORT:    Verbal report given to Yanely on Joelle Acevedo  being transferred to 2115 for routine progression of care       Report consisted of patients Situation, Background, Assessment and   Recommendations(SBAR). Information from the following report(s) SBAR, Kardex, ED Summary and MAR was reviewed with the receiving nurse. Lines:   Peripheral IV 04/03/17 Left Antecubital (Active)   Site Assessment Clean, dry, & intact 4/3/2017 10:09 AM   Phlebitis Assessment 0 4/3/2017 10:09 AM   Infiltration Assessment 0 4/3/2017 10:09 AM   Dressing Status Clean, dry, & intact 4/3/2017 10:09 AM   Hub Color/Line Status Pink 4/3/2017 10:09 AM        Opportunity for questions and clarification was provided.       [facility]

## 2017-04-03 NOTE — PROGRESS NOTES
Report given from ED RN to [facility]. ED RN stated once current bag is empty one bag of fluid bolus still needed.  Last bag hung at [facility]

## 2017-04-03 NOTE — ED PROVIDER NOTES
HPI Comments: Harriett Summers is a 55 y.o. female with PMhx significant for Ovarian CA, HTN, Asthma, and DM who presents ambulatory to ED [facility] ED with cc of increased constant weakness and fatigue with subjective fever and chills since 4/1/2017. She reports that she had a blood transfusion on 3/31/2017 and chemotherapy on 3/30/2017 noting she is unsure if these procedures are associated to her sx. She reports having a recent stress test with unremarkable findings. She specifically denies any fevers, nausea, vomiting, chest pain, shortness of breath, headache, rash, diarrhea, sweating or weight loss. Oncology: Dr. Arnaldo Rogers   PCP: Maddie Abdalla MD    There are no other complaints, changes or physical findings at this time. Written by Talita Cavanaugh ED Scribe, as dictated by Ashley Gonsalves. Mihir Leal MD.          The history is provided by the patient. No  was used. Past Medical History:   Diagnosis Date    Adverse effect of anesthesia     sleep apnea cpap machine useage     Anemia     Arthritis     DDD low back and knees    Asthma     uses albuterol prn only; none x 6 mos.   Never hospitalized    BRCA negative 1/2013    Calculus of kidney     Chronic kidney disease     kidney stones    Chronic pain     knee pain bilat    CINV (chemotherapy-induced nausea and vomiting) 8/21/2014    Diabetes ([address] Age 46    IDDM    Environmental allergies     Fibromyalgia     GERD (gastroesophageal reflux disease)     controlled with med    Hypertension     Ill-defined condition     Sepsis 9-2015    Ill-defined condition 2016    chemotherapy     Morbid obesity ([address]     Other and unspecified hyperlipidemia     Ovarian cancer ([address] 9/2012, 1/2014    serous, high grade, stage IIIc. s/p chemotx (has port)    Psychiatric disorder     Rectal bleeding     Thromboembolus ([address] 1993    POST PARTUM; resolved- PELVIS    Thyroid disease     HYPOTHYROID    Unspecified sleep apnea     CPAP, Dr. Severiano Gramajo Past Surgical History:   Procedure Laterality Date    BREAST SURGERY PROCEDURE UNLISTED      Lymph node in left breast 2017    CHEST SURGERY PROCEDURE UNLISTED      Placement of port a cath right chest    HX GYN       X2    HX HYSTERECTOMY  2012    ROULA/BSO, tumor debulking, omentectomy for ovarian cancer    HX ORTHOPAEDIC      ankle-FX, R    HX ORTHOPAEDIC      FX R ARM    HX UROLOGICAL      stent    HX VASCULAR ACCESS  2015    right chest- port placed         Family History:   Problem Relation Age of Onset    Hypertension Mother     Arthritis-osteo Mother     Lung Disease Father      COPD    Hypertension Father     Arthritis-osteo Father     Hypertension Brother     Elevated Lipids Brother     Arthritis-osteo Brother     Hypertension Brother     Elevated Lipids Brother     Obesity Brother     Cancer Brother      PROSTATE    Anesth Problems Son      DELAYED AWAKENING    Diabetes Maternal Grandmother        Social History     Social History    Marital status:      Spouse name: N/A    Number of children: N/A    Years of education: N/A     Occupational History    Not on file. Social History Main Topics    Smoking status: Never Smoker    Smokeless tobacco: Never Used    Alcohol use No    Drug use: Yes     Special: Prescription, OTC    Sexual activity: Yes     Other Topics Concern    Not on file     Social History Narrative    Lives in [facility] alone.  passed away in  of a heart attack. Has 2 sons. Disability. 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 Enjoys swimming and going to the gym. ALLERGIES: Carboplatin; Iodinated contrast media - oral and iv dye; Lipitor [atorvastatin]; Percocet [oxycodone-acetaminophen]; Shellfish containing products; and Tape [adhesive]    Review of Systems   Constitutional: Positive for chills, fatigue and fever (subjective).  Negative for activity change, appetite change, diaphoresis and unexpected weight change. HENT: Negative. Negative for congestion, hearing loss, rhinorrhea, sneezing and voice change. Eyes: Negative. Negative for pain and visual disturbance. Respiratory: Negative. Negative for apnea, cough, choking, chest tightness and shortness of breath. Cardiovascular: Negative. Negative for chest pain and palpitations. Gastrointestinal: Negative. Negative for abdominal distention, abdominal pain, blood in stool, diarrhea, nausea and vomiting. Genitourinary: Negative. Negative for difficulty urinating, flank pain, frequency and urgency. No discharge   Musculoskeletal: Negative. Negative for arthralgias, back pain, myalgias and neck stiffness. Skin: Negative. Negative for color change and rash. Neurological: Positive for weakness. Negative for dizziness, seizures, syncope, speech difficulty, numbness and headaches. Hematological: Negative for adenopathy. Psychiatric/Behavioral: Negative. Negative for agitation, behavioral problems, dysphoric mood and suicidal ideas. The patient is not nervous/anxious. All other systems reviewed and are negative. Vitals:    04/06/17 0004 04/06/17 0521 04/06/17 0756 04/06/17 1126   BP: 125/72 117/57 119/56 136/76   Pulse: 64 65 65 74   Resp: 18 18 18 24   Temp: 98 °F (36.7 °C) 97.5 °F (36.4 °C) 98.1 °F (36.7 °C) 97.9 °F (36.6 °C)   SpO2: 95% 95% 97% 96%   Weight:       Height:                Physical Exam   Constitutional: She is oriented to person, place, and time. She appears well-developed and well-nourished. No distress. HENT:   Head: Normocephalic and atraumatic. Mouth/Throat: Oropharynx is clear and moist. No oropharyngeal exudate. Eyes: Conjunctivae and EOM are normal. Pupils are equal, round, and reactive to light. Right eye exhibits no discharge. Left eye exhibits no discharge. Neck: Normal range of motion. Neck supple. Cardiovascular: Normal rate, regular rhythm and intact distal pulses.   Exam reveals no gallop and no friction rub. No murmur heard. Pulmonary/Chest: Effort normal and breath sounds normal. No respiratory distress. She has no wheezes. She has no rales. She exhibits no tenderness. Abdominal: Soft. Bowel sounds are normal. She exhibits no distension and no mass. There is no tenderness. There is no rebound and no guarding. Musculoskeletal: Normal range of motion. She exhibits no edema. Lymphadenopathy:     She has no cervical adenopathy. Neurological: She is alert and oriented to person, place, and time. No cranial nerve deficit. Coordination normal.   Skin: Skin is warm and dry. No rash noted. No erythema. Psychiatric: She has a normal mood and affect. Nursing note and vitals reviewed. MDM  Number of Diagnoses or Management Options  Diagnosis management comments: DDx: fatigue, UTI, dehydration, chemotherapy side effect        Amount and/or Complexity of Data Reviewed  Clinical lab tests: ordered and reviewed  Tests in the radiology section of CPT®: ordered and reviewed  Tests in the medicine section of CPT®: ordered and reviewed  Review and summarize past medical records: yes  Discuss the patient with other providers: yes (hospitalist)  Independent visualization of images, tracings, or specimens: yes    Patient Progress  Patient progress: stable    ED Course       Procedures  Chief Complaint   Patient presents with    Shortness of Breath     pt immediately bedded from triage with reports of shortness of breath x2 days       10:20 AM  The patients presenting problems have been discussed, and they are in agreement with the care plan formulated and outlined with them. I have encouraged them to ask questions as they arise throughout their visit.     MEDICATIONS GIVEN:  Medications   sodium chloride 0.9 % bolus infusion 1,000 mL (0 mL IntraVENous IV Completed 4/3/17 1310)   cefTRIAXone (ROCEPHIN) 1 g in 0.9% sodium chloride (MBP/ADV) 50 mL (0 g IntraVENous IV Completed 4/3/17 1413)   sodium chloride 0.9 % bolus infusion 4,497 mL (0 mL IntraVENous IV Completed 4/3/17 1942)   xenon xe 060 gas 10 millicurie (10 millicuries Inhalation Given 4/3/17 1429)   technetium albumin aggregated (MAA) solution 4 millicurie (4 millicuries IntraVENous Given 4/3/17 1429)       LABS REVIEWED:  Recent Results (from the past 24 hour(s))   GLUCOSE, POC    Collection Time: 04/06/17  8:02 AM   Result Value Ref Range    Glucose (POC) 83 65 - 100 mg/dL    Performed by Vinh Thurston (PCT)        VITAL SIGNS:  No data found. RADIOLOGY RESULTS:  The following have been ordered and reviewed:  Study Result      INDICATION:Chest Pain     COMPARISON: CTA chest 3/12/17     FINDINGS:   A portable AP radiograph of the chest was obtained at 1040 hours. The patient is on a cardiac monitor. Right Port-A-Cath is unchanged. The heart size and pulmonary vascularity are within normal limits. Fullness in the left suprahilar region similar to prior exams consistent with  known adenopathy. No focal infiltrates or areas of consolidation demonstrated. The osseous and soft tissues are unremarkable.      IMPRESSION  IMPRESSION:   1. Fullness in the left suprahilar consistent with known adenopathy. 2. No acute cardiopulmonary abnormality demonstrated.          EKG interpretation: (Preliminary) 1049  Rhythm: sinus rhythm with short WI with premature atrial complexes; and regular . Rate (approx.): 79; Axis: normal; P wave: normal; QRS interval: normal ; ST/T wave: normal;     CONSULTATIONS:   2:00 PM  Lakhwinder Jade MD spoke with Dr. Caldwell Sessions,   Specialty: Hospitalist  Discussed pt's hx, disposition, and available diagnostic and imaging results. Reviewed care plans. Consultant will evaluate pt for admission. Written by GREGORIO Dietrichibe, as dictated by Gala Jade MD.    PROGRESS NOTE:  1:15 PM  Will obtain CT scan due to elevated d-dimer.   Written by GREGORIO Dietrich, as dictated by Josh Aleman Mel Gonzalez MD.    DIAGNOSIS:    1. Acute cystitis with hematuria    2. Pulmonary hypertension, mild ([address]    3. Acute renal failure, unspecified acute renal failure type [facility])        PLAN:  Admit to hospital  2:01 PM  Patient is being admitted to the hospital.  The results of their tests and reasons for their admission have been discussed with them and/or available family. They convey agreement and understanding for the need to be admitted and for their admission diagnosis. Consultation has been made with the inpatient physician specialist for hospitalization. Attestation: This is note is prepared by Ryna Albright, acting as Scribe for Silvano Bertrand. Carlos Eduardo Mccall, [address] [facility] Carlos Eduardo Mccall MD The scribe's documentation has been prepared under my direction and personally reviewed by me in its entirety. I confirm that the note above accurately reflects all work, treatment, procedures, and medical decision making performed by me. ED COURSE: The patients hospital course has been uncomplicated.

## 2017-04-03 NOTE — ROUTINE PROCESS
End of Shift Nursing Note    Bedside shift change report given to Walter Ochoa (oncoming nurse) by Reta Fields RN (offgoing nurse). Report included the following information SBAR. [facility] Phone:   [phone]    Significant changes during shift:    Hang half bag for fluid bolus. Take vitals Q15 til finished. Call MD once finished   Non-emergent issues for physician to address:        Number times ambulated in hallway past shift: 0      Number of times OOB to chair past shift: 0    POD #:      Vital Signs:    Temp: 99.4 °F (37.4 °C)     Pulse (Heart Rate): 87     BP: 134/67     Resp Rate: 18     O2 Sat (%): 100 %    Lines & Drains:     Urinary Catheter? No   Placement Date:    Medical Necessity:   Central Line? No   Placement Date:    Medical Necessity:   PICC Line? No   Placement Date:    Medical Necessity:     NG tube [] in [] removed [] not applicable   Drains [] in [] removed [] not applicable     Skin Integrity:      Wounds: no   Dressings Present: no    Wound Concerns: no      GI:    Current diet:  DIET DIABETIC CONSISTENT CARB Regular    Nausea: NO  Vomiting: NO  Bowel Sounds: YES  Flatus: YES  Last Bowel Movement: yesterday   Appearance:     Respiratory:  Supplemental O2: No      Device:    via  Liters/min     Incentive Spirometer: YES  Volume:   Coughing and Deep Breathing: YES  Oral Care: YES  Understanding (patient/family education): YES   Getting out of bed: YES  Head of bed elevation: YES    Patient Safety:    Falls Score: 1  Mobility Score: 1  Bed Alarm On? No  Sitter? No      Opportunity for questions and clarification was given to oncoming nurse. Patient bed is in lowest position, side rails are up x 2, door & observation blinds open as needed, call bell within reach and patient not in distress.     Cyn Barriga RN

## 2017-04-03 NOTE — TELEPHONE ENCOUNTER
Called pt's son to check on pt, she was at [facility], he states they cassandra blood and have done a urine but no results as of yet

## 2017-04-03 NOTE — H&P
Hospitalist Admission Note    NAME: Harvey Moise   :  1960   MRN:  [id number]     Date/Time:  4/3/2017 4:18 PM    Patient PCP: Katerine Irwin MD  ________________________________________________________________________    My assessment of this patient's clinical condition and my plan of care is as follows. Assessment / Plan:  Sepsis- fever 101.7 at home, wbc 15  Complicated UTI  Leukocytosis  -pt with hx of L renal stone s/p cystoscopy, b/l retrograde pyelogarms and L ureterorenoscopy with laser lithotripsy and stent s/p stent removal on 4/3. She presented with fever of 101.7, leuko  -UA consistent with UTI  -f/u with urine culture, BC  -continue IV rocephin to complete 7 day course    Acute kidney Injury  -likely secondary to volume depletion in the setting of poor po intake and fever  -IVF resuscitation, monitor for vol overload  -hold nephrotoxic agents including furosemide and valsartan  -repeat bmp    Massive peritoneal carcinomatosis/retroperitoneal pelvic and R inguinal adenopathy  -last chemo 2 weeks ago. Pt follows with Dr Anali Read, Gyn Onc    Acute on chronic respiratory failure  LE edema L>R  Mild Pulm HTn (40mmhg)  -stable on RA.  cxr neg, v/q scan low probability for pe  -probnp ~600s, physical exam without evidence of vol overload  -3/1/17 stress test neg for ischemia, with EF 53%  -holding lasix while on IVF  -O2 suppl prn    SHABANA/asthma  -pt states family will bring home CPAP  -nebs     T2DM  -resuming novolin 20 units TID, lantus at lower dose due to poor po intake  -SSI  -diabetic diet      Code Status: Full  Surrogate Decision Maker: samira Turner at [phone]    DVT Prophylaxis: heparin  GI Prophylaxis: not indicated    Baseline: independent        Subjective:   CHIEF COMPLAINT: fever, gen weakness    HISTORY OF PRESENT ILLNESS:     Satish White is a 55 y.o.    female w pmhx significant for ovarian ca, htn, asthma, T2DM present to ED c/o generalized weakness associated with fver 101.7 at home. Other associated symptoms including chills, poor appetite, and nausea. Patient was seen at urology clinic today with stent removal and was sent to ED for further evaluation when she appeared ill-ed. She has hx of ovarian ca with chemo 2 weeks ago in addition to blood transfusion on 3/31. She also complains of chronic sob that is worsening. In the ED, vitals stable. UA consistent with UTI. Labs: wbc 15, cr 1.88 ,baseline cr 1.35  We were asked to admit for work up and evaluation of the above problems. Past Medical History:   Diagnosis Date    Adverse effect of anesthesia     sleep apnea cpap machine useage     Anemia     Arthritis     DDD low back and knees    Asthma     uses albuterol prn only; none x 6 mos.   Never hospitalized    BRCA negative 2013    Calculus of kidney     Chronic kidney disease     kidney stones    Chronic pain     knee pain bilat    CINV (chemotherapy-induced nausea and vomiting) 2014    Diabetes ([address] Age 46    IDDM    Environmental allergies     Fibromyalgia     GERD (gastroesophageal reflux disease)     controlled with med    Hypertension     Ill-defined condition     Sepsis     Ill-defined condition 2016    chemotherapy     Morbid obesity ([address]     Other and unspecified hyperlipidemia     Ovarian cancer ([address] 2012, 2014    serous, high grade, stage IIIc. s/p chemotx (has port)    Psychiatric disorder     Rectal bleeding     Thromboembolus ([address]     POST PARTUM; resolved- PELVIS    Thyroid disease     HYPOTHYROID    Unspecified sleep apnea     CPAP, Dr. Maddie Freeman        Past Surgical History:   Procedure Laterality Date    BREAST SURGERY PROCEDURE UNLISTED      Lymph node in left breast 2017    CHEST SURGERY PROCEDURE UNLISTED      Placement of port a cath right chest    HX GYN       X2    HX HYSTERECTOMY  2012    ROULA/BSO, tumor debulking, omentectomy for ovarian cancer    HX ORTHOPAEDIC      ankle-FX, R    HX ORTHOPAEDIC      FX R ARM    HX UROLOGICAL      stent    HX VASCULAR ACCESS  11/4/2015    right chest- port placed       Social History   Substance Use Topics    Smoking status: Never Smoker    Smokeless tobacco: Never Used    Alcohol use No        Family History   Problem Relation Age of Onset    Hypertension Mother     Arthritis-osteo Mother     Lung Disease Father      COPD    Hypertension Father     Arthritis-osteo Father     Hypertension Brother     Elevated Lipids Brother     Arthritis-osteo Brother     Hypertension Brother     Elevated Lipids Brother     Obesity Brother     Cancer Brother      PROSTATE    Anesth Problems Son      DELAYED AWAKENING    Diabetes Maternal Grandmother      Allergies   Allergen Reactions    Carboplatin Other (comments)     Patient developed shortness of breath, felt faint, coughing, skin flushed and \"itchy\". This occurred on the patients 8th treatment.  Iodinated Contrast Media - Oral And Iv Dye Rash    Lipitor [Atorvastatin] Myalgia    Percocet [Oxycodone-Acetaminophen] Other (comments)     fever    Shellfish Containing Products Hives    Tape [Adhesive] Itching and Other (comments)     \"op site-- clear,thin tape\"--caused blister         Prior to Admission medications    Medication Sig Start Date End Date Taking? Authorizing Provider   cycloSPORINE (RESTASIS) 0.05 % ophthalmic emulsion 1 Drop two (2) times a day. Historical Provider   OTHER Vitamin called easy promise takes two after dinner    Historical Provider   multivitamin (ONE A DAY) tablet Take 2 Tabs by mouth daily (after dinner). Historical Provider   HYDROcodone-acetaminophen (NORCO) 5-325 mg per tablet Take 1 Tab by mouth every four (4) hours as needed for Pain. Max Daily Amount: 6 Tabs. 3/12/17   Shon Sands   sertraline (ZOLOFT) 50 mg tablet TAKE 1 TABLET BY MOUTH EVERY MORNING 2/18/17   Historical Provider   clonazePAM (KLONOPIN) 1 mg tablet TAKE 1 TABLET BY MOUTH TWICE A DAY 2/18/17   Historical Provider   methocarbamol (ROBAXIN) 500 mg tablet Take  by mouth three (3) times daily. Historical Provider   ondansetron (ZOFRAN ODT) 8 mg disintegrating tablet Take 1 Tab by mouth every eight (8) hours as needed for Nausea. 1/20/17   Jean Phoenix III, MD   valsartan (DIOVAN) 160 mg tablet TAKE 1 TAB BY MOUTH DAILY. REPLACES VALSARTAN-HCTZ 1/13/17   Alma Rosa Dill MD   LEVEMIR FLEXTOUCH 100 unit/mL (3 mL) inpn 30 UNITS IN THE MORNING AND 50 UNITS AT NIGHT. INCREASE AS DIRECTED TO MAX  UNITS PER DAY 11/30/16   Alma Rosa Dill MD   montelukast (SINGULAIR) 10 mg tablet Take 1 Tab by mouth daily. 9/23/16   Jose Lin MD   levothyroxine (SYNTHROID) 150 mcg tablet TAKE 1 TAB BY MOUTH DAILY (BEFORE BREAKFAST). 9/22/16   Alma Rosa Dill MD   Lancets misc Use as directed to test blood sugar three times daily-DX-DM-250.00 7/28/16   Jose Lin MD   fluticasone-salmeterol (ADVAIR HFA) 057-90 mcg/actuation inhaler Take 2 Puffs by inhalation two (2) times a day. 7/7/16   Gloria Meek MD   BD INSULIN PEN NEEDLE UF SHORT 31 gauge x 5/16\" ndle USE WITH INSULIN TO INJECT FIVE TIMES DAILY. 7/6/16   Alma Rosa Dill MD   albuterol (PROVENTIL VENTOLIN) 2.5 mg /3 mL (0.083 %) nebulizer solution 3 mL by Nebulization route every four (4) hours as needed for Wheezing. 6/9/16   Jose Lin MD   Liraglutide (VICTOZA) 0.6 mg/0.1 mL (18 mg/3 mL) sub-q pen 0.3 mL by SubCUTAneous route daily (with lunch). 5/18/16   Alma Rosa Dill MD   NOVOLOG FLEXPEN 100 unit/mL inpn INJECT 20 UNITS BEFORE EACH MEAL + 4 UNITS FOR EVERY 50 MG/DL ABOVE 150 MG/DL.  UNITS PER DAY 5/12/16   Alma Rosa Dill MD   potassium chloride SR (KLOR-CON 10) 10 mEq tablet Take 1 Tab by mouth two (2) days a week. 12/31/15   Jose Lin MD   cholecalciferol, VITAMIN D3, (VITAMIN D3) 5,000 unit tab tablet Take  by mouth daily.     Historical Provider   furosemide (LASIX) 40 mg tablet Take 80 mg by mouth as needed. 9/15/15   Historical Provider   Blood-Glucose Meter monitoring kit Use as directed to test blood sugar three times daily-DX-DM-250.00 8/6/15   Darrell Britt MD   albuterol (PROVENTIL HFA, VENTOLIN HFA, PROAIR HFA) 90 mcg/actuation inhaler Take 2 Puffs by inhalation every four (4) hours as needed for Wheezing. Historical Provider   fexofenadine (ALLEGRA) 180 mg tablet Take 180 mg by mouth daily. Iris Hernandez MD   mometasone (NASONEX) 50 mcg/actuation nasal spray 2 sprays by Both Nostrils route as needed. Iris Hernandez MD       REVIEW OF SYSTEMS:     I am not able to complete the review of systems because:    The patient is intubated and sedated    The patient has altered mental status due to his acute medical problems    The patient has baseline aphasia from prior stroke(s)    The patient has baseline dementia and is not reliable historian    The patient is in acute medical distress and unable to provide information           Total of 12 systems reviewed as follows:       POSITIVE= underlined text  Negative = text not underlined  General:  fever, chills, sweats, generalized weakness, weight loss/gain,      loss of appetite   Eyes:    blurred vision, eye pain, loss of vision, double vision  ENT:    rhinorrhea, pharyngitis   Respiratory:   cough, sputum production, SOB, ECKERT, wheezing, pleuritic pain   Cardiology:   chest pain, palpitations, orthopnea, PND, edema, syncope   Gastrointestinal:  abdominal pain , N/V, diarrhea, dysphagia, constipation, bleeding   Genitourinary:  frequency, urgency, dysuria, hematuria, incontinence   Muskuloskeletal :  arthralgia, myalgia, back pain  Hematology:  easy bruising, nose or gum bleeding, lymphadenopathy   Dermatological: rash, ulceration, pruritis, color change / jaundice  Endocrine:   hot flashes or polydipsia   Neurological:  headache, dizziness, confusion, focal weakness, paresthesia,     Speech difficulties, memory loss, gait difficulty  Psychological: Feelings of anxiety, depression, agitation    Objective:   VITALS:    Visit Vitals    /70    Pulse 73    Temp 98.5 °F (36.9 °C)    Resp 22    Ht 5' 9\" (1.753 m)    Wt 149.9 kg (330 lb 7.5 oz)    SpO2 96%    BMI 48.8 kg/m2       PHYSICAL EXAM:    General:    Alert, cooperative, no distress, appears stated age. HEENT: Atraumatic, anicteric sclerae, pink conjunctivae     No oral ulcers, mucosa moist, throat clear, dentition fair  Neck:  Supple, symmetrical,  thyroid: non tender  Lungs:   CTA b/l. No Wheezing or Rhonchi. No rales. Chest wall:  No tenderness  No Accessory muscle use. Heart:   Regular  rhythm,  No  murmur   No edema  Abdomen:   Soft, non-tender. Not distended. +BS, +CVA tenderness  Extremities: LE edema L>R. No cyanosis. No clubbing      Capillary refill normal,  Radial pulse 2+  Skin:     Not pale. Not Jaundiced  No rashes   Psych:  Good insight. Not depressed. Not anxious or agitated. Neurologic: EOMs intact. No facial asymmetry. No aphasia or slurred speech. Symmetrical strength, Sensation grossly intact.  Alert and oriented X 4.     _______________________________________________________________________  Care Plan discussed with:    Comments   Patient x    Family      RN x    Care Manager                    Consultant:      _______________________________________________________________________  Expected  Disposition:   Home with Family x   HH/PT/OT/RN    SNF/LTC    DELICIA    ________________________________________________________________________  TOTAL TIME:  61 Minutes    Critical Care Provided     Minutes non procedure based      Comments    x Reviewed previous records   >50% of visit spent in counseling and coordination of care  Discussion with patient and/or family and questions answered       ________________________________________________________________________  Signed: Roya Jean MD    Procedures: see electronic medical records for all procedures/Xrays and details which were not copied into this note but were reviewed prior to creation of Plan. LAB DATA REVIEWED:    Recent Results (from the past 24 hour(s))   CBC WITH AUTOMATED DIFF    Collection Time: 04/03/17 10:25 AM   Result Value Ref Range    WBC 15.3 (H) 3.6 - 11.0 K/uL    RBC 3.20 (L) 3.80 - 5.20 M/uL    HGB 9.0 (L) 11.5 - 16.0 g/dL    HCT 28.7 (L) 35.0 - 47.0 %    MCV 89.7 80.0 - 99.0 FL    MCH 28.1 26.0 - 34.0 PG    MCHC 31.4 30.0 - 36.5 g/dL    RDW 14.9 (H) 11.5 - 14.5 %    PLATELET 503 082 - 466 K/uL    NEUTROPHILS 77 (H) 32 - 75 %    LYMPHOCYTES 11 (L) 12 - 49 %    MONOCYTES 12 5 - 13 %    EOSINOPHILS 0 0 - 7 %    BASOPHILS 0 0 - 1 %    ABS. NEUTROPHILS 11.9 (H) 1.8 - 8.0 K/UL    ABS. LYMPHOCYTES 1.6 0.8 - 3.5 K/UL    ABS. MONOCYTES 1.8 (H) 0.0 - 1.0 K/UL    ABS. EOSINOPHILS 0.0 0.0 - 0.4 K/UL    ABS. BASOPHILS 0.0 0.0 - 0.1 K/UL   METABOLIC PANEL, COMPREHENSIVE    Collection Time: 04/03/17 10:25 AM   Result Value Ref Range    Sodium 136 136 - 145 mmol/L    Potassium 4.3 3.5 - 5.1 mmol/L    Chloride 103 97 - 108 mmol/L    CO2 20 (L) 21 - 32 mmol/L    Anion gap 13 5 - 15 mmol/L    Glucose 175 (H) 65 - 100 mg/dL    BUN 21 (H) 6 - 20 MG/DL    Creatinine 1.88 (H) 0.55 - 1.02 MG/DL    BUN/Creatinine ratio 11 (L) 12 - 20      GFR est AA 34 (L) >60 ml/min/1.73m2    GFR est non-AA 28 (L) >60 ml/min/1.73m2    Calcium 8.4 (L) 8.5 - 10.1 MG/DL    Bilirubin, total 0.8 0.2 - 1.0 MG/DL    ALT (SGPT) 25 12 - 78 U/L    AST (SGOT) 36 15 - 37 U/L    Alk.  phosphatase 87 45 - 117 U/L    Protein, total 9.0 (H) 6.4 - 8.2 g/dL    Albumin 2.9 (L) 3.5 - 5.0 g/dL    Globulin 6.1 (H) 2.0 - 4.0 g/dL    A-G Ratio 0.5 (L) 1.1 - 2.2     MAGNESIUM    Collection Time: 04/03/17 10:25 AM   Result Value Ref Range    Magnesium 1.6 1.6 - 2.4 mg/dL   PROTHROMBIN TIME + INR    Collection Time: 04/03/17 10:25 AM   Result Value Ref Range    INR 1.1 0.9 - 1.1      Prothrombin time 11.1 9.0 - 11.1 sec   TROPONIN I Collection Time: 04/03/17 10:25 AM   Result Value Ref Range    Troponin-I, Qt. <0.04 <0.05 ng/mL   PRO-BNP    Collection Time: 04/03/17 10:25 AM   Result Value Ref Range    NT pro- (H) 0 - 125 PG/ML   EKG, 12 LEAD, INITIAL    Collection Time: 04/03/17 10:49 AM   Result Value Ref Range    Ventricular Rate 79 BPM    Atrial Rate 79 BPM    P-R Interval 110 ms    QRS Duration 74 ms    Q-T Interval 360 ms    QTC Calculation (Bezet) 412 ms    Calculated P Axis 13 degrees    Calculated R Axis -3 degrees    Calculated T Axis 19 degrees    Diagnosis       ** Poor data quality, interpretation may be adversely affected  Sinus rhythm with short NM with premature atrial complexes    Confirmed by Carrie Farias MD, Wade Max ([id number]) on 4/3/2017 1:30:23 PM     D DIMER    Collection Time: 04/03/17 12:00 PM   Result Value Ref Range    D-dimer 8.17 (H) 0.00 - 0.65 mg/L FEU   URINALYSIS W/MICROSCOPIC    Collection Time: 04/03/17  1:02 PM   Result Value Ref Range    Color DARK YELLOW      Appearance TURBID (A) CLEAR      Specific gravity 1.019 1.003 - 1.030      pH (UA) 5.5 5.0 - 8.0      Protein 100 (A) NEG mg/dL    Glucose NEGATIVE  NEG mg/dL    Ketone TRACE (A) NEG mg/dL    Blood LARGE (A) NEG      Urobilinogen 1.0 0.2 - 1.0 EU/dL    Nitrites NEGATIVE  NEG      Leukocyte Esterase LARGE (A) NEG      WBC >100 (H) 0 - 4 /hpf    RBC 10-20 0 - 5 /hpf    Epithelial cells FEW FEW /lpf    Bacteria 1+ (A) NEG /hpf    Mucus TRACE (A) NEG /lpf   BILIRUBIN, CONFIRM    Collection Time: 04/03/17  1:02 PM   Result Value Ref Range    Bilirubin UA, confirm NEGATIVE  NEG     LACTIC ACID, PLASMA    Collection Time: 04/03/17  1:26 PM   Result Value Ref Range    Lactic acid 1.2 0.4 - 2.0 MMOL/L

## 2017-04-04 ENCOUNTER — APPOINTMENT (OUTPATIENT)
Dept: CT IMAGING | Age: 57
DRG: 872 | End: 2017-04-04
Attending: INTERNAL MEDICINE
Payer: MEDICARE

## 2017-04-04 LAB
ANION GAP BLD CALC-SCNC: 13 MMOL/L (ref 5–15)
BASOPHILS # BLD AUTO: 0 K/UL (ref 0–0.1)
BASOPHILS # BLD: 0 % (ref 0–1)
BUN SERPL-MCNC: 26 MG/DL (ref 6–20)
BUN/CREAT SERPL: 17 (ref 12–20)
CALCIUM SERPL-MCNC: 8.2 MG/DL (ref 8.5–10.1)
CHLORIDE SERPL-SCNC: 110 MMOL/L (ref 97–108)
CO2 SERPL-SCNC: 19 MMOL/L (ref 21–32)
CREAT SERPL-MCNC: 1.53 MG/DL (ref 0.55–1.02)
EOSINOPHIL # BLD: 0.2 K/UL (ref 0–0.4)
EOSINOPHIL NFR BLD: 2 % (ref 0–7)
ERYTHROCYTE [DISTWIDTH] IN BLOOD BY AUTOMATED COUNT: 15 % (ref 11.5–14.5)
GLUCOSE BLD STRIP.AUTO-MCNC: 101 MG/DL (ref 65–100)
GLUCOSE BLD STRIP.AUTO-MCNC: 102 MG/DL (ref 65–100)
GLUCOSE BLD STRIP.AUTO-MCNC: 112 MG/DL (ref 65–100)
GLUCOSE BLD STRIP.AUTO-MCNC: 129 MG/DL (ref 65–100)
GLUCOSE SERPL-MCNC: 103 MG/DL (ref 65–100)
HCT VFR BLD AUTO: 25 % (ref 35–47)
HGB BLD-MCNC: 7.8 G/DL (ref 11.5–16)
LYMPHOCYTES # BLD AUTO: 15 % (ref 12–49)
LYMPHOCYTES # BLD: 1.8 K/UL (ref 0.8–3.5)
MCH RBC QN AUTO: 28.1 PG (ref 26–34)
MCHC RBC AUTO-ENTMCNC: 31.2 G/DL (ref 30–36.5)
MCV RBC AUTO: 89.9 FL (ref 80–99)
MONOCYTES # BLD: 1.9 K/UL (ref 0–1)
MONOCYTES NFR BLD AUTO: 16 % (ref 5–13)
NEUTS SEG # BLD: 7.8 K/UL (ref 1.8–8)
NEUTS SEG NFR BLD AUTO: 67 % (ref 32–75)
PLATELET # BLD AUTO: 132 K/UL (ref 150–400)
POTASSIUM SERPL-SCNC: 4.3 MMOL/L (ref 3.5–5.1)
RBC # BLD AUTO: 2.78 M/UL (ref 3.8–5.2)
SERVICE CMNT-IMP: ABNORMAL
SODIUM SERPL-SCNC: 142 MMOL/L (ref 136–145)
WBC # BLD AUTO: 11.6 K/UL (ref 3.6–11)

## 2017-04-04 PROCEDURE — 74011000250 HC RX REV CODE- 250: Performed by: INTERNAL MEDICINE

## 2017-04-04 PROCEDURE — 65660000000 HC RM CCU STEPDOWN

## 2017-04-04 PROCEDURE — 74011250637 HC RX REV CODE- 250/637: Performed by: INTERNAL MEDICINE

## 2017-04-04 PROCEDURE — 82962 GLUCOSE BLOOD TEST: CPT

## 2017-04-04 PROCEDURE — 74011636637 HC RX REV CODE- 636/637: Performed by: INTERNAL MEDICINE

## 2017-04-04 PROCEDURE — 74011250636 HC RX REV CODE- 250/636: Performed by: INTERNAL MEDICINE

## 2017-04-04 PROCEDURE — 74176 CT ABD & PELVIS W/O CONTRAST: CPT

## 2017-04-04 PROCEDURE — 74011000258 HC RX REV CODE- 258: Performed by: INTERNAL MEDICINE

## 2017-04-04 PROCEDURE — 85025 COMPLETE CBC W/AUTO DIFF WBC: CPT | Performed by: INTERNAL MEDICINE

## 2017-04-04 PROCEDURE — 36415 COLL VENOUS BLD VENIPUNCTURE: CPT | Performed by: INTERNAL MEDICINE

## 2017-04-04 PROCEDURE — 87040 BLOOD CULTURE FOR BACTERIA: CPT | Performed by: INTERNAL MEDICINE

## 2017-04-04 PROCEDURE — 80048 BASIC METABOLIC PNL TOTAL CA: CPT | Performed by: INTERNAL MEDICINE

## 2017-04-04 RX ADMIN — SERTRALINE HYDROCHLORIDE 50 MG: 50 TABLET ORAL at 17:41

## 2017-04-04 RX ADMIN — METHOCARBAMOL 500 MG: 500 TABLET ORAL at 15:17

## 2017-04-04 RX ADMIN — METHOCARBAMOL 500 MG: 500 TABLET ORAL at 09:37

## 2017-04-04 RX ADMIN — CEFTRIAXONE 1 G: 1 INJECTION, POWDER, FOR SOLUTION INTRAMUSCULAR; INTRAVENOUS at 15:16

## 2017-04-04 RX ADMIN — CYCLOSPORINE 1 DROP: 0.5 EMULSION OPHTHALMIC at 09:37

## 2017-04-04 RX ADMIN — FLUTICASONE FUROATE AND VILANTEROL TRIFENATATE 1 PUFF: 200; 25 POWDER RESPIRATORY (INHALATION) at 09:37

## 2017-04-04 RX ADMIN — HEPARIN SODIUM 5000 UNITS: 5000 INJECTION, SOLUTION INTRAVENOUS; SUBCUTANEOUS at 06:37

## 2017-04-04 RX ADMIN — Medication 10 ML: at 06:37

## 2017-04-04 RX ADMIN — CLONAZEPAM 1 MG: 1 TABLET ORAL at 09:36

## 2017-04-04 RX ADMIN — Medication 10 ML: at 22:04

## 2017-04-04 RX ADMIN — INSULIN HUMAN 20 UNITS: 100 INJECTION, SUSPENSION SUBCUTANEOUS at 22:04

## 2017-04-04 RX ADMIN — INSULIN HUMAN 20 UNITS: 100 INJECTION, SUSPENSION SUBCUTANEOUS at 09:38

## 2017-04-04 RX ADMIN — CLONAZEPAM 1 MG: 1 TABLET ORAL at 17:40

## 2017-04-04 RX ADMIN — HEPARIN SODIUM 5000 UNITS: 5000 INJECTION, SOLUTION INTRAVENOUS; SUBCUTANEOUS at 15:16

## 2017-04-04 RX ADMIN — CYCLOSPORINE 1 DROP: 0.5 EMULSION OPHTHALMIC at 22:03

## 2017-04-04 RX ADMIN — SODIUM CHLORIDE 75 ML/HR: 900 INJECTION, SOLUTION INTRAVENOUS at 09:40

## 2017-04-04 RX ADMIN — LEVOTHYROXINE SODIUM 150 MCG: 150 TABLET ORAL at 06:37

## 2017-04-04 RX ADMIN — HEPARIN SODIUM 5000 UNITS: 5000 INJECTION, SOLUTION INTRAVENOUS; SUBCUTANEOUS at 22:05

## 2017-04-04 RX ADMIN — INSULIN GLARGINE 20 UNITS: 100 INJECTION, SOLUTION SUBCUTANEOUS at 09:39

## 2017-04-04 RX ADMIN — METHOCARBAMOL 500 MG: 500 TABLET ORAL at 22:00

## 2017-04-04 RX ADMIN — INSULIN HUMAN 20 UNITS: 100 INJECTION, SUSPENSION SUBCUTANEOUS at 17:41

## 2017-04-04 RX ADMIN — INSULIN GLARGINE 40 UNITS: 100 INJECTION, SOLUTION SUBCUTANEOUS at 22:03

## 2017-04-04 NOTE — TELEPHONE ENCOUNTER
Pt's son  to inform us that patient has been admitted to [facility] for sepsis.

## 2017-04-04 NOTE — PROGRESS NOTES
Hospitalist Progress Note    NAME: Jimi Barriga   :  1960   MRN:  [id number]       Interim Hospital Summary: 55 y.o. female whom presented on 4/3/2017 with      Assessment / Plan:  Sepsis  Complicated UTI  Leukocytosis, improving  -pt with hx of L renal stone s/p cystoscopy, b/l retrograde pyelogarms and L ureterorenoscopy with laser lithotripsy and stent s/p stent removal on 4/3. She presented with fever of 101.7, leukocytosis of 15  -UA consistent with UTI  -urine cx pending, BC showed gram neg rods  -will repeat BC  -CT abd showed b/l nephrolithiasis. Soft tissue density L renal hilum   -continue IV rocephin to complete 7 day course     Acute kidney Injury, improving  -likely secondary to volume depletion in the setting of poor po intake and fever  -will discontinue ivf, pt able to take po intake  -hold nephrotoxic agents including furosemide and valsartan  -repeat bmp     Massive peritoneal carcinomatosis/retroperitoneal pelvic and R inguinal adenopathy  -last chemo 2 weeks ago. Pt follows with Dr Chucho Prescott, Gyn Onc     Acute on chronic respiratory failure  LE edema L>R  Mild Pulm HTn (40mmhg)  -stable on RA. cxr neg, v/q scan low probability for pe  -probnp ~600s, physical exam without evidence of vol overload  -3/1/17 stress test neg for ischemia, with EF 53%  -holding lasix   -O2 suppl prn     SHABANA/asthma  -pt states family will bring home CPAP  -nebs      T2DM  -resuming novolin 20 units TID, lantus at lower dose due to poor po intake  -SSI  -diabetic diet        Code Status: Full  Surrogate Decision Maker: samira Mehta at [phone]     DVT Prophylaxis: heparin  GI Prophylaxis: not indicated     Baseline: independent     Subjective:     Chief Complaint / Reason for Physician Visit  No acute complaints. Had fever overnight. Discussed with RN events overnight.      Review of Systems:  Symptom Y/N Comments  Symptom Y/N Comments   Fever/Chills n   Chest Pain n    Poor Appetite n   Edema n    Cough n Abdominal Pain n    Sputum n   Joint Pain n    SOB/ECKERT n   Pruritis/Rash n    Nausea/vomit    Tolerating PT/OT     Diarrhea    Tolerating Diet     Constipation    Other       Could NOT obtain due to:      Objective:     VITALS:   Last 24hrs VS reviewed since prior progress note. Most recent are:  Patient Vitals for the past 24 hrs:   Temp Pulse Resp BP SpO2   04/04/17 1129 98.3 °F (36.8 °C) 72 16 107/62 95 %   04/04/17 0741 98.6 °F (37 °C) 73 16 107/61 95 %   04/03/17 1943 - 77 - 130/63 95 %   04/03/17 1938 (!) 100.9 °F (38.3 °C) 77 18 125/59 96 %   04/03/17 1844 99.3 °F (37.4 °C) 83 16 142/72 99 %   04/03/17 1803 99.4 °F (37.4 °C) 87 18 134/67 100 %       Intake/Output Summary (Last 24 hours) at 04/04/17 1607  Last data filed at 04/04/17 1501   Gross per 24 hour   Intake          2188.75 ml   Output              150 ml   Net          2038.75 ml        PHYSICAL EXAM:  General: WD, WN. Alert, cooperative, no acute distress    EENT:  EOMI. Anicteric sclerae. MMM  Resp:  CTA bilaterally, no wheezing or rales. No accessory muscle use  CV:  Regular  rhythm,  No edema  GI:  Soft, Non distended, Non tender.  +Bowel sounds  Neurologic:  Alert and oriented X 3, normal speech,   Psych:   Good insight. Not anxious nor agitated  Skin:  No rashes. No jaundice    Reviewed most current lab test results and cultures  YES  Reviewed most current radiology test results   YES  Review and summation of old records today    NO  Reviewed patient's current orders and MAR    YES  PMH/SH reviewed - no change compared to H&P  ________________________________________________________________________  Care Plan discussed with:    Comments   Patient x    Family      RN x    Care Manager     Consultant                        Multidiciplinary team rounds were held today with , nursing, pharmacist and clinical coordinator. Patient's plan of care was discussed; medications were reviewed and discharge planning was addressed. ________________________________________________________________________  Total NON critical care TIME:  45   Minutes    Total CRITICAL CARE TIME Spent:   Minutes non procedure based      Comments   >50% of visit spent in counseling and coordination of care x    ________________________________________________________________________  Shereen Waters MD     Procedures: see electronic medical records for all procedures/Xrays and details which were not copied into this note but were reviewed prior to creation of Plan. LABS:  I reviewed today's most current labs and imaging studies.   Pertinent labs include:  Recent Labs      04/04/17 0622 04/03/17   1025   WBC  11.6*  15.3*   HGB  7.8*  9.0*   HCT  25.0*  28.7*   PLT  132*  163     Recent Labs      04/04/17 0622 04/03/17   1025   NA  142  136   K  4.3  4.3   CL  110*  103   CO2  19*  20*   GLU  103*  175*   BUN  26*  21*   CREA  1.53*  1.88*   CA  8.2*  8.4*   MG   --   1.6   ALB   --   2.9*   TBILI   --   0.8   SGOT   --   36   ALT   --   25   INR   --   1.1       Signed: Shereen Waters MD

## 2017-04-04 NOTE — PROGRESS NOTES
Spiritual Care Partner Volunteer visited patient in Gen Surg on April 4, 2017.   Documented by:  BRITTNEY Donovan, [facility], [address]     Paging Service  [phone])

## 2017-04-04 NOTE — PROGRESS NOTES
End of Shift Nursing Note    Bedside shift change report given to Sara (oncoming nurse) by Edi Camarillo (offgoing nurse). Report included the following information SBAR, Kardex, Procedure Summary and Recent Results. Zone Phone:   [phone]    Significant changes during shift:    none   Non-emergent issues for physician to address:   none     Number times ambulated in hallway past shift: none      Number of times OOB to chair past shift: 2    POD #: n/a     Vital Signs:    Temp: (!) 100.9 °F (38.3 °C)     Pulse (Heart Rate): 77     BP: 130/63     Resp Rate: 18     O2 Sat (%): 95 %    Lines & Drains:     Urinary Catheter? No   Placement Date:    Medical Necessity:   Central Line? No   Placement Date:    Medical Necessity:   PICC Line? No   Placement Date:    Medical Necessity:     NG tube [] in [] removed [x] not applicable   Drains [] in [] removed [x] not applicable     Skin Integrity:      Wounds: no   Dressings Present: no    Wound Concerns: no      GI:    Current diet:  DIET DIABETIC CONSISTENT CARB Regular    Nausea: NO  Vomiting: NO  Bowel Sounds: YES  Flatus: YES  Last Bowel Movement: yesterday   Appearance:     Respiratory:  Supplemental O2: No      Device:    via  Liters/min     Incentive Spirometer: YES  Volume:   Coughing and Deep Breathing: YES  Oral Care: NO  Understanding (patient/family education): YES   Getting out of bed: YES  Head of bed elevation: YES    Patient Safety:    Falls Score: 2  Mobility Score: 2  Bed Alarm On? No  Sitter? No      Opportunity for questions and clarification was given to oncoming nurse. Patient bed is in low position, side rails are up x 2, door & observation blinds open as needed, call bell within reach and patient not in distress.     Emi Schwartz RN

## 2017-04-04 NOTE — PROGRESS NOTES
CM attempted to do a room assessment with pt, however, pt is currently resting. CM will return at a later time.     HITESH Kendrick CM  [phone]

## 2017-04-04 NOTE — ROUTINE PROCESS
End of Shift Nursing Note    Bedside shift change report given to Dar Bloom RN (oncoming nurse) by Josh Mason RN (offgoing nurse). Report included the following information AR. [facility] Phone:   [phone]    Significant changes during shift:         Non-emergent issues for physician to address:        Number times ambulated in hallway past shift: 0      Number of times OOB to chair past shift: 2    POD #:      Vital Signs:    Temp: 98.1 °F (36.7 °C)     Pulse (Heart Rate): 67     BP: 107/52     Resp Rate: 16     O2 Sat (%): 95 %    Lines & Drains:     Urinary Catheter? No   Placement Date:    Medical Necessity:   Central Line? NO   Placement Date:    Medical Necessity:   PICC Line? No   Placement Date:    Medical Necessity:     NG tube [] in [] removed [] not applicable   Drains [] in [] removed [] not applicable     Skin Integrity:      Wounds: no   Dressings Present: no    Wound Concerns: no      GI:    Current diet:  DIET DIABETIC CONSISTENT CARB Regular    Nausea: NO  Vomiting: NO  Bowel Sounds: YES  Flatus: YES  Last Bowel Movement: yesterday   Appearance:     Respiratory:  Supplemental O2: No      Device:    via  Liters/min     Incentive Spirometer: YES  Volume:   Coughing and Deep Breathing: YES  Oral Care: YES  Understanding (patient/family education): YES   Getting out of bed: YES  Head of bed elevation: YES    Patient Safety:    Falls Score: 1  Mobility Score: 1  Bed Alarm On? No  Sitter? No      Opportunity for questions and clarification was given to oncoming nurse. Patient bed is in lowest position, side rails are up x 2, door & observation blinds open as needed, call bell within reach and patient not in distress.     Orlando Modi RN

## 2017-04-05 LAB
GLUCOSE BLD STRIP.AUTO-MCNC: 71 MG/DL (ref 65–100)
GLUCOSE BLD STRIP.AUTO-MCNC: 72 MG/DL (ref 65–100)
GLUCOSE BLD STRIP.AUTO-MCNC: 91 MG/DL (ref 65–100)
GLUCOSE BLD STRIP.AUTO-MCNC: 93 MG/DL (ref 65–100)
SERVICE CMNT-IMP: NORMAL

## 2017-04-05 PROCEDURE — 65660000000 HC RM CCU STEPDOWN

## 2017-04-05 PROCEDURE — 74011636637 HC RX REV CODE- 636/637: Performed by: INTERNAL MEDICINE

## 2017-04-05 PROCEDURE — 74011000258 HC RX REV CODE- 258: Performed by: INTERNAL MEDICINE

## 2017-04-05 PROCEDURE — 74011250636 HC RX REV CODE- 250/636: Performed by: INTERNAL MEDICINE

## 2017-04-05 PROCEDURE — 74011000250 HC RX REV CODE- 250: Performed by: INTERNAL MEDICINE

## 2017-04-05 PROCEDURE — 82962 GLUCOSE BLOOD TEST: CPT

## 2017-04-05 PROCEDURE — 74011250637 HC RX REV CODE- 250/637: Performed by: INTERNAL MEDICINE

## 2017-04-05 RX ORDER — INSULIN LISPRO 100 [IU]/ML
20 INJECTION, SOLUTION INTRAVENOUS; SUBCUTANEOUS
Status: DISCONTINUED | OUTPATIENT
Start: 2017-04-05 | End: 2017-04-06 | Stop reason: HOSPADM

## 2017-04-05 RX ADMIN — Medication 10 ML: at 22:14

## 2017-04-05 RX ADMIN — CLONAZEPAM 1 MG: 1 TABLET ORAL at 18:12

## 2017-04-05 RX ADMIN — METHOCARBAMOL 500 MG: 500 TABLET ORAL at 18:12

## 2017-04-05 RX ADMIN — INSULIN GLARGINE 40 UNITS: 100 INJECTION, SOLUTION SUBCUTANEOUS at 22:13

## 2017-04-05 RX ADMIN — SERTRALINE HYDROCHLORIDE 50 MG: 50 TABLET ORAL at 18:12

## 2017-04-05 RX ADMIN — FLUTICASONE FUROATE AND VILANTEROL TRIFENATATE 1 PUFF: 200; 25 POWDER RESPIRATORY (INHALATION) at 12:42

## 2017-04-05 RX ADMIN — METHOCARBAMOL 500 MG: 500 TABLET ORAL at 12:41

## 2017-04-05 RX ADMIN — INSULIN GLARGINE 20 UNITS: 100 INJECTION, SOLUTION SUBCUTANEOUS at 12:43

## 2017-04-05 RX ADMIN — CYCLOSPORINE 1 DROP: 0.5 EMULSION OPHTHALMIC at 22:13

## 2017-04-05 RX ADMIN — CLONAZEPAM 1 MG: 1 TABLET ORAL at 12:41

## 2017-04-05 RX ADMIN — Medication 10 ML: at 05:24

## 2017-04-05 RX ADMIN — HEPARIN SODIUM 5000 UNITS: 5000 INJECTION, SOLUTION INTRAVENOUS; SUBCUTANEOUS at 20:42

## 2017-04-05 RX ADMIN — LEVOTHYROXINE SODIUM 150 MCG: 150 TABLET ORAL at 06:26

## 2017-04-05 RX ADMIN — METHOCARBAMOL 500 MG: 500 TABLET ORAL at 22:14

## 2017-04-05 RX ADMIN — Medication 10 ML: at 00:22

## 2017-04-05 RX ADMIN — Medication 10 ML: at 12:50

## 2017-04-05 RX ADMIN — INSULIN LISPRO 20 UNITS: 100 INJECTION, SOLUTION INTRAVENOUS; SUBCUTANEOUS at 13:53

## 2017-04-05 RX ADMIN — HEPARIN SODIUM 5000 UNITS: 5000 INJECTION, SOLUTION INTRAVENOUS; SUBCUTANEOUS at 05:24

## 2017-04-05 RX ADMIN — HEPARIN SODIUM 5000 UNITS: 5000 INJECTION, SOLUTION INTRAVENOUS; SUBCUTANEOUS at 12:41

## 2017-04-05 RX ADMIN — CEFTRIAXONE 1 G: 1 INJECTION, POWDER, FOR SOLUTION INTRAMUSCULAR; INTRAVENOUS at 18:12

## 2017-04-05 NOTE — PROGRESS NOTES
Spiritual Care Partner Volunteer visited patient in Gen Surg on 4/5/17.   Documented by:  BRITTNEY Landrum, [facility], [address]     Paging Service  [phone])

## 2017-04-05 NOTE — PROGRESS NOTES
Hospitalist Progress Note    NAME: Jimi Barriga   :  1960   MRN:  [id number]       Interim Hospital Summary: 55 y.o. female whom presented on 4/3/2017 with      Assessment / Plan:  Sepsis  Complicated UTI  Leukocytosis, improving  -pt with hx of L renal stone s/p cystoscopy, b/l retrograde pyelogarms and L ureterorenoscopy with laser lithotripsy and stent s/p stent removal on 4/3. She presented with fever of 101.7, leukocytosis of 15  -UA consistent with UTI  -urine and BC showed gram neg sharon  -repeat BC ntd  -CT abd showed b/l nephrolithiasis. Soft tissue density L renal hilum-discussed with Dr Kita Stafford. He reviewed images and rec continue with current therapy  -continue IV rocephin to complete 7 day course, awaiting cultlure sensitivity     Acute kidney Injury, improving  -likely secondary to volume depletion in the setting of poor po intake and fever  -will discontinue ivf, pt able to take po intake  -hold nephrotoxic agents including furosemide and valsartan  -repeat bmp     Massive peritoneal carcinomatosis/retroperitoneal pelvic and R inguinal adenopathy  -last chemo 2 weeks ago. Pt follows with Dr Chucho Prescott, Gyn Onc     Acute on chronic respiratory failure  LE edema L>R  Mild Pulm HTn (40mmhg)  -stable on RA. cxr neg, v/q scan low probability for pe  -probnp ~600s, physical exam without evidence of vol overload  -3/1/17 stress test neg for ischemia, with EF 53%  -holding lasix, resume at discharge  -O2 suppl prn     SHABANA/asthma  -cpap from home  -nebs      T2DM  -resuming novolin 20 units TID, lantus at lower dose due to poor po intake  -SSI  -diabetic diet        Code Status: Full  Surrogate Decision Maker: samira Mehta at [phone]     DVT Prophylaxis: heparin  GI Prophylaxis: not indicated     Baseline: independent     Subjective:     Chief Complaint / Reason for Physician Visit  No acute complaints. Feeling better  Discussed with RN events overnight.      Review of Systems:  Symptom Y/N Comments Symptom Y/N Comments   Fever/Chills n   Chest Pain n    Poor Appetite n   Edema n    Cough n   Abdominal Pain n    Sputum n   Joint Pain n    SOB/ECKERT n   Pruritis/Rash n    Nausea/vomit    Tolerating PT/OT     Diarrhea    Tolerating Diet     Constipation    Other       Could NOT obtain due to:      Objective:     VITALS:   Last 24hrs VS reviewed since prior progress note. Most recent are:  Patient Vitals for the past 24 hrs:   Temp Pulse Resp BP SpO2   04/05/17 1438 98.1 °F (36.7 °C) 63 17 110/61 97 %   04/05/17 1112 98.2 °F (36.8 °C) 63 17 106/52 98 %   04/05/17 0731 97.8 °F (36.6 °C) 61 17 104/49 96 %   04/05/17 0330 97.4 °F (36.3 °C) 66 16 116/50 100 %   04/04/17 2330 98.8 °F (37.1 °C) 61 16 112/61 100 %   04/04/17 2103 98.7 °F (37.1 °C) 67 16 103/45 96 %     No intake or output data in the 24 hours ending 04/05/17 1633     PHYSICAL EXAM:  General: WD, WN. Alert, cooperative, no acute distress    EENT:  EOMI. Anicteric sclerae. MMM  Resp:  CTA bilaterally, no wheezing or rales. No accessory muscle use  CV:  Regular  rhythm,  No edema  GI:  Soft, Non distended, Non tender.  +Bowel sounds  Neurologic:  Alert and oriented X 3, normal speech,   Psych:   Good insight. Not anxious nor agitated  Skin:  No rashes. No jaundice    Reviewed most current lab test results and cultures  YES  Reviewed most current radiology test results   YES  Review and summation of old records today    NO  Reviewed patient's current orders and MAR    YES  PMH/SH reviewed - no change compared to H&P  ________________________________________________________________________  Care Plan discussed with:    Comments   Patient x    Family      RN x    Care Manager     Consultant                        Multidiciplinary team rounds were held today with , nursing, pharmacist and clinical coordinator. Patient's plan of care was discussed; medications were reviewed and discharge planning was addressed. ________________________________________________________________________  Total NON critical care TIME:  35   Minutes    Total CRITICAL CARE TIME Spent:   Minutes non procedure based      Comments   >50% of visit spent in counseling and coordination of care x    ________________________________________________________________________  Coretta Cote MD     Procedures: see electronic medical records for all procedures/Xrays and details which were not copied into this note but were reviewed prior to creation of Plan. LABS:  I reviewed today's most current labs and imaging studies.   Pertinent labs include:  Recent Labs      04/04/17 0622 04/03/17   1025   WBC  11.6*  15.3*   HGB  7.8*  9.0*   HCT  25.0*  28.7*   PLT  132*  163     Recent Labs      04/04/17 0622 04/03/17   1025   NA  142  136   K  4.3  4.3   CL  110*  103   CO2  19*  20*   GLU  103*  175*   BUN  26*  21*   CREA  1.53*  1.88*   CA  8.2*  8.4*   MG   --   1.6   ALB   --   2.9*   TBILI   --   0.8   SGOT   --   36   ALT   --   25   INR   --   1.1       Signed: Coretta Cote MD

## 2017-04-05 NOTE — PROGRESS NOTES
End of Shift Nursing Note    Bedside shift change report given to Cliff Sofia RN (oncoming nurse) by NIKIA Cordero (offgoing nurse). Report included the following information SBAR, Kardex, Intake/Output and MAR. Zone Phone:   [phone]    Significant changes during shift:    None    Non-emergent issues for physician to address:   Patient had 2 loose stools overnight. Number times ambulated in hallway past shift: 0      Number of times OOB to chair past shift: 3    POD #:      Vital Signs:    Temp: 98.8 °F (37.1 °C)     Pulse (Heart Rate): 61     BP: 112/61     Resp Rate: 16     O2 Sat (%): 100 %    Lines & Drains:     Urinary Catheter? No   Placement Date:    Medical Necessity:   Central Line? No   Placement Date:    Medical Necessity:   PICC Line? No   Placement Date:    Medical Necessity:     NG tube [] in [] removed [x] not applicable   Drains [] in [] removed [x] not applicable     Skin Integrity:      Wounds: no   Dressings Present: no    Wound Concerns: no      GI:    Current diet:  DIET DIABETIC CONSISTENT CARB Regular    Nausea: NO  Vomiting: NO  Bowel Sounds: YES  Flatus: YES  Last Bowel Movement: yesterday   Appearance: diarrhea    Respiratory:  Supplemental O2: No      Device:    via  Liters/min     Incentive Spirometer: YES  Volume:   Coughing and Deep Breathing: YES  Oral Care: YES  Understanding (patient/family education): YES   Getting out of bed: YES  Head of bed elevation: YES    Patient Safety:    Falls Score: Mobility Score: 2  Bed Alarm On? No  Sitter? No      Opportunity for questions and clarification was given to oncoming nurse. Patient bed is in fowlers position, side rails are up x 2, door & observation blinds open as needed, call bell within reach and patient not in distress.     Gopal Meyer

## 2017-04-05 NOTE — PROGRESS NOTES
Pt was admitted for UTI. Pt was alert and oriented, upon CM room visit. Pt reported that she resides in a 3 story home with her 2 sons (8 steps into the main entrance). Pt reported that she was independent with ADLs/IADLs, and still drive. Pt reported that she last seen her PCP: March 2017 and she uses [facility]. Pt reported that she owns DME at home: rollator, CPAP (daily), and bedside commode. Pt reported that she has had HH in the past.  Pt reported no SNF. Pt reported that a nurse navigator comes to her home once a week provided by [facility]. CM will continue to follow up with pt, and make referrals as deemed necessary. Care Management Interventions  PCP Verified by CM: Yes  Mode of Transport at Discharge: Other (see comment)  Transition of Care Consult (CM Consult): Discharge Planning  Discharge Durable Medical Equipment: No  Physical Therapy Consult: No  Occupational Therapy Consult: No  Speech Therapy Consult: No  Current Support Network:  Other, Own Home  Confirm Follow Up Transport: Family  Plan discussed with Pt/Family/Caregiver: Yes  Discharge Location  Discharge Placement: [facility] [address], MSW   [phone]

## 2017-04-05 NOTE — DIABETES MGMT
DTC Progress Note    Recommendations/ Comments: If appropriate, please consider discontinuing NPH 20 units ac tid as pt is on Novolog 20 units ac tid at home not NPH (Novolin) and this could be contributing to her hypoglycemia this morning. Please switch to hospital formulary Humalog 20 units ac tid if pt with PO intakes consistently >50%. Chart reviewed on [facility] for hypoglycemia. Patient is a 55 y.o. female with known history of Type 2 Diabetes on Levemir 30 units qam and 50 units qpm, Novolog 20 units ac tid and Victoza 0.3mL daily at home. A1c:   Lab Results   Component Value Date/Time    Hemoglobin A1c 6.0 04/03/2017 10:25 AM    Hemoglobin A1c 6.4 02/27/2017 11:39 AM       Recent Glucose Results: Lab Results   Component Value Date/Time    GLUCPOC 71 04/05/2017 07:37 AM    GLUCPOC 129 (H) 04/04/2017 09:02 PM    GLUCPOC 101 (H) 04/04/2017 04:50 PM        Lab Results   Component Value Date/Time    Creatinine 1.53 04/04/2017 06:22 AM       Active Orders   Diet    DIET DIABETIC CONSISTENT CARB Regular        PO intake: Patient Vitals for the past 72 hrs:   % Diet Eaten   04/04/17 1501 100 %   04/04/17 0800 100 %       Current hospital DM medication:   -Lantus 20 units qam, 40 units hs  -Humalog normal sensitivity correction   -NPH 20 units ac tid     Will continue to follow as needed.     Thank you    Angie More, [address]  Office: [phone]

## 2017-04-05 NOTE — PROGRESS NOTES
Please consider the following: On Victoza PTA; Pharmacy does not stock and family unable to supply at this time. Please consider discontinuing while patient is in the hospital and resume upon discharge. Nololin N 20 Units SC TID; was on Novolog 20 Units TID AC; please consider changing back to home med. Continue Sliding Scale insulin ac/hs    Thanks.

## 2017-04-06 ENCOUNTER — APPOINTMENT (OUTPATIENT)
Dept: INFUSION THERAPY | Age: 57
End: 2017-04-06
Payer: MEDICARE

## 2017-04-06 VITALS
HEART RATE: 74 BPM | WEIGHT: 293 LBS | HEIGHT: 69 IN | TEMPERATURE: 97.9 F | OXYGEN SATURATION: 96 % | DIASTOLIC BLOOD PRESSURE: 76 MMHG | BODY MASS INDEX: 43.4 KG/M2 | SYSTOLIC BLOOD PRESSURE: 136 MMHG | RESPIRATION RATE: 24 BRPM

## 2017-04-06 LAB
ANION GAP BLD CALC-SCNC: 10 MMOL/L (ref 5–15)
BACTERIA SPEC CULT: ABNORMAL
BUN SERPL-MCNC: 18 MG/DL (ref 6–20)
BUN/CREAT SERPL: 17 (ref 12–20)
CALCIUM SERPL-MCNC: 8.8 MG/DL (ref 8.5–10.1)
CC UR VC: ABNORMAL
CHLORIDE SERPL-SCNC: 111 MMOL/L (ref 97–108)
CO2 SERPL-SCNC: 22 MMOL/L (ref 21–32)
CREAT SERPL-MCNC: 1.06 MG/DL (ref 0.55–1.02)
GLUCOSE BLD STRIP.AUTO-MCNC: 83 MG/DL (ref 65–100)
GLUCOSE SERPL-MCNC: 91 MG/DL (ref 65–100)
POTASSIUM SERPL-SCNC: 4.1 MMOL/L (ref 3.5–5.1)
SERVICE CMNT-IMP: ABNORMAL
SERVICE CMNT-IMP: ABNORMAL
SERVICE CMNT-IMP: NORMAL
SODIUM SERPL-SCNC: 143 MMOL/L (ref 136–145)

## 2017-04-06 PROCEDURE — 97161 PT EVAL LOW COMPLEX 20 MIN: CPT

## 2017-04-06 PROCEDURE — G8988 SELF CARE GOAL STATUS: HCPCS | Performed by: OCCUPATIONAL THERAPIST

## 2017-04-06 PROCEDURE — G8989 SELF CARE D/C STATUS: HCPCS | Performed by: OCCUPATIONAL THERAPIST

## 2017-04-06 PROCEDURE — 74011000250 HC RX REV CODE- 250: Performed by: INTERNAL MEDICINE

## 2017-04-06 PROCEDURE — 74011636637 HC RX REV CODE- 636/637: Performed by: INTERNAL MEDICINE

## 2017-04-06 PROCEDURE — 74011250636 HC RX REV CODE- 250/636: Performed by: INTERNAL MEDICINE

## 2017-04-06 PROCEDURE — 74011250637 HC RX REV CODE- 250/637: Performed by: INTERNAL MEDICINE

## 2017-04-06 PROCEDURE — G8987 SELF CARE CURRENT STATUS: HCPCS | Performed by: OCCUPATIONAL THERAPIST

## 2017-04-06 PROCEDURE — 80048 BASIC METABOLIC PNL TOTAL CA: CPT | Performed by: INTERNAL MEDICINE

## 2017-04-06 PROCEDURE — G8979 MOBILITY GOAL STATUS: HCPCS

## 2017-04-06 PROCEDURE — 82962 GLUCOSE BLOOD TEST: CPT

## 2017-04-06 PROCEDURE — G8978 MOBILITY CURRENT STATUS: HCPCS

## 2017-04-06 PROCEDURE — 97165 OT EVAL LOW COMPLEX 30 MIN: CPT | Performed by: OCCUPATIONAL THERAPIST

## 2017-04-06 PROCEDURE — G8980 MOBILITY D/C STATUS: HCPCS

## 2017-04-06 PROCEDURE — 36415 COLL VENOUS BLD VENIPUNCTURE: CPT | Performed by: INTERNAL MEDICINE

## 2017-04-06 RX ORDER — AMPICILLIN 250 MG/1
250 CAPSULE ORAL
Qty: 20 CAP | Refills: 0 | Status: SHIPPED | OUTPATIENT
Start: 2017-04-06 | End: 2017-04-06

## 2017-04-06 RX ORDER — CEFUROXIME AXETIL 500 MG/1
500 TABLET ORAL 2 TIMES DAILY
Qty: 24 TAB | Refills: 0 | Status: SHIPPED | OUTPATIENT
Start: 2017-04-06 | End: 2017-04-18

## 2017-04-06 RX ORDER — NYSTATIN 100000 [USP'U]/G
POWDER TOPICAL
Qty: 30 G | Refills: 3 | Status: SHIPPED | OUTPATIENT
Start: 2017-04-06 | End: 2017-07-18

## 2017-04-06 RX ORDER — NITROFURANTOIN (MACROCRYSTALS) 100 MG/1
100 CAPSULE ORAL 2 TIMES DAILY
Qty: 10 CAP | Refills: 0 | Status: SHIPPED | OUTPATIENT
Start: 2017-04-06 | End: 2017-04-11

## 2017-04-06 RX ORDER — CEPHALEXIN 500 MG/1
500 CAPSULE ORAL 2 TIMES DAILY
Qty: 20 CAP | Refills: 0 | Status: SHIPPED | OUTPATIENT
Start: 2017-04-06 | End: 2017-04-06

## 2017-04-06 RX ADMIN — HEPARIN SODIUM 5000 UNITS: 5000 INJECTION, SOLUTION INTRAVENOUS; SUBCUTANEOUS at 05:25

## 2017-04-06 RX ADMIN — LEVOTHYROXINE SODIUM 150 MCG: 150 TABLET ORAL at 06:37

## 2017-04-06 RX ADMIN — CYCLOSPORINE 1 DROP: 0.5 EMULSION OPHTHALMIC at 08:00

## 2017-04-06 RX ADMIN — CLONAZEPAM 1 MG: 1 TABLET ORAL at 10:08

## 2017-04-06 RX ADMIN — METHOCARBAMOL 500 MG: 500 TABLET ORAL at 10:08

## 2017-04-06 RX ADMIN — Medication 10 ML: at 06:37

## 2017-04-06 RX ADMIN — INSULIN LISPRO 20 UNITS: 100 INJECTION, SOLUTION INTRAVENOUS; SUBCUTANEOUS at 10:08

## 2017-04-06 RX ADMIN — FLUTICASONE FUROATE AND VILANTEROL TRIFENATATE 1 PUFF: 200; 25 POWDER RESPIRATORY (INHALATION) at 10:15

## 2017-04-06 RX ADMIN — INSULIN GLARGINE 20 UNITS: 100 INJECTION, SOLUTION SUBCUTANEOUS at 10:26

## 2017-04-06 NOTE — PROGRESS NOTES
physical Therapy EVALUATION/DISCHARGE  Patient: Jean Cisse (89 y.o. female)  Date: 4/6/2017  Primary Diagnosis: UTI (urinary tract infection)        Precautions:      ASSESSMENT :  Based on the objective data described below, the patient presents with new onset of increased R ankle pain however baseline modified independent functional mobility, performing all bed mobility, sit<>stand transfers, and ambulation with use of RW at mod I level. Pt exhibited decreased gait speed w/ antalgic gait pattern however no LOB/balance deficits noted. Activity tolerance this date limited due to R ankle pain as pt reported 9/10 pain with mobility. Pt reports that she feels at her baseline level in terms of strength, balance, and overall mobility despite increased R ankle pain. At this time, pt is functioning at her baseline level and has no further skilled therapy needs. Anticipate that once ankle pain resolves pt will return to independent level. Pt is safe to discharge home w/ no further therapy needs when medically appropriate. DC PT. Skilled physical therapy is not indicated at this time. PLAN :  Discharge Recommendations: None  Further Equipment Recommendations for Discharge: None     SUBJECTIVE:   Patient stated This ankle just started bothering me. It's weird .     OBJECTIVE DATA SUMMARY:   HISTORY:    Past Medical History:   Diagnosis Date    Adverse effect of anesthesia     sleep apnea cpap machine useage     Anemia     Arthritis     DDD low back and knees    Asthma     uses albuterol prn only; none x 6 mos.   Never hospitalized    BRCA negative 1/2013    Calculus of kidney     Chronic kidney disease     kidney stones    Chronic pain     knee pain bilat    CINV (chemotherapy-induced nausea and vomiting) 8/21/2014    Diabetes ([facility] [address] Age 46    IDDM    Environmental allergies     Fibromyalgia     GERD (gastroesophageal reflux disease)     controlled with med    Hypertension     Ill-defined condition Sepsis     Ill-defined condition 2016    chemotherapy     Morbid obesity ([facility] [address]     Other and unspecified hyperlipidemia     Ovarian cancer ([facility] [address] 2012, 2014    serous, high grade, stage IIIc. s/p chemotx (has port)    Psychiatric disorder     Rectal bleeding     Thromboembolus ([facility] [address]     POST PARTUM; resolved- PELVIS    Thyroid disease     HYPOTHYROID    Unspecified sleep apnea     CPAP, Dr. Amee Peralta     Past Surgical History:   Procedure Laterality Date    BREAST SURGERY PROCEDURE UNLISTED      Lymph node in left breast 2017    CHEST SURGERY PROCEDURE UNLISTED      Placement of port a cath right chest    HX GYN       X2    HX HYSTERECTOMY  2012    ROULA/BSO, tumor debulking, omentectomy for ovarian cancer    HX ORTHOPAEDIC      ankle-FX, R    HX ORTHOPAEDIC      FX R ARM    HX UROLOGICAL      stent    HX VASCULAR ACCESS  2015    right chest- port placed     Prior Level of Function/Home Situation: Modified independent w/ use of either quad cane or rollator walker (pt states it \"depends\" on how shes feeling that day as to which device she uses). Denies history of falls. Lives with her 2 children who assist with grocery shopping. Still driving. Independent ADLs.    Personal factors and/or comorbidities impacting plan of care: hx of ovarian CA    Home Situation  Home Environment: Private residence  # Steps to Enter: 8  Rails to Enter: Yes  Hand Rails : Bilateral  One/Two Story Residence: Two story  Living Alone: No  Support Systems: Child(leonor), Family member(s)  Patient Expects to be Discharged to[de-identified] Private residence  Current DME Used/Available at Home: CPAP, Cane, quad, Walker, rollator, Commode, bedside, Nebulizer  Tub or Shower Type: Shower    EXAMINATION/PRESENTATION/DECISION MAKING:   Critical Behavior:  Neurologic State: Alert  Orientation Level: Oriented X4  Cognition: Appropriate decision making, Follows commands  Safety/Judgement: Awareness of environment, Good awareness of safety precautions, Insight into deficits  Hearing: Auditory  Auditory Impairment: None  Skin:  Intact  Edema: R ankle  Range Of Motion:  AROM: Within functional limits                       Strength:    Strength: Generally decreased, functional                    Tone & Sensation:   Tone: Normal              Sensation:  (tingling/numbness in B feet due to neuropathy)               Coordination:     Vision:   Acuity: Within Defined Limits  Corrective Lenses: Glasses  Functional Mobility:  Bed Mobility:  Rolling: Independent  Supine to Sit: Independent     Scooting: Independent  Transfers:  Sit to Stand: Modified independent  Stand to Sit: Modified independent        Bed to Chair: Modified independent              Balance:   Sitting: Intact  Standing: Intact  Ambulation/Gait Training:  Distance (ft): 55 Feet (ft)  Assistive Device: Walker, rolling;Gait belt  Ambulation - Level of Assistance: Modified independent        Gait Abnormalities: Antalgic        Base of Support: Widened     Speed/Hui: Pace decreased (<100 feet/min)                                Functional Measure:  Barthel Index:    Bathin  Bladder: 10  Bowels: 10  Groomin  Dressing: 10  Feeding: 10  Mobility: 15  Stairs: 10  Toilet Use: 10  Transfer (Bed to Chair and Back): 15  Total: 100       Barthel and G-code impairment scale:  Percentage of impairment CH  0% CI  1-19% CJ  20-39% CK  40-59% CL  60-79% CM  80-99% CN  100%   Barthel Score 0-100 100 99-80 79-60 59-40 20-39 1-19   0   Barthel Score 0-20 20 17-19 13-16 9-12 5-8 1-4 0      The Barthel ADL Index: Guidelines  1. The index should be used as a record of what a patient does, not as a record of what a patient could do. 2. The main aim is to establish degree of independence from any help, physical or verbal, however minor and for whatever reason. 3. The need for supervision renders the patient not independent.   4. A patient's performance should be established using the best available evidence. Asking the patient, friends/relatives and nurses are the usual sources, but direct observation and common sense are also important. However direct testing is not needed. 5. Usually the patient's performance over the preceding 24-48 hours is important, but occasionally longer periods will be relevant. 6. Middle categories imply that the patient supplies over 50 per cent of the effort. 7. Use of aids to be independent is allowed. Federico Cole., Barthel, D.W. (1003). Functional evaluation: the Barthel Index. [address] [facility] (14)2. Lisa Escobar courtney JAMES Aguilera, Rigoberto Baum., Cierra Borja., [place], [address] (1999). Measuring the change indisability after inpatient rehabilitation; comparison of the responsiveness of the Barthel Index and Functional [place] Measure. Journal of Neurology, Neurosurgery, and Psychiatry, 66(4), 671-834. Marni Howe, N.J.A, Kaela Felix  W.J.KEVIN, & Rahel Rosenbaum M.A. (2004.) Assessment of post-stroke quality of life in cost-effectiveness studies: The usefulness of the Barthel Index and the EuroQoL-5D. Quality of Life Research, 13, 729-62       G codes: In compliance with CMSs Claims Based Outcome Reporting, the following G-code set was chosen for this patient based on their primary functional limitation being treated: The outcome measure chosen to determine the severity of the functional limitation was the Barthel Index with a score of 100/100 which was correlated with the impairment scale.     ? Mobility - Walking and Moving Around:     - CURRENT STATUS: CH - 0% impaired, limited or restricted    - GOAL STATUS: CH - 0% impaired, limited or restricted    - D/C STATUS:  CH - 0% impaired, limited or restricted        Physical Therapy Evaluation Charge Determination   History Examination Presentation Decision-Making   LOW Complexity : Zero comorbidities / personal factors that will impact the outcome / POC LOW Complexity : 1-2 Standardized tests and measures addressing body structure, function, activity limitation and / or participation in recreation  LOW Complexity : Stable, uncomplicated  LOW Complexity : FOTO score of       Based on the above components, the patient evaluation is determined to be of the following complexity level: LOW     Pain:           Activity Tolerance:   VSS on RA  Please refer to the flowsheet for vital signs taken during this treatment. After treatment:   [x]   Patient left in no apparent distress sitting up in chair  []   Patient left in no apparent distress in bed  [x]   Call bell left within reach  [x]   Nursing notified  []   Caregiver present  []   Bed alarm activated    COMMUNICATION/EDUCATION:   Communication/Collaboration:  [x]   Fall prevention education was provided and the patient/caregiver indicated understanding. [x]   Patient/family have participated as able and agree with findings and recommendations. []   Patient is unable to participate in plan of care at this time.   Findings and recommendations were discussed with: Registered Nurse    Thank you for this referral.  Sabrina Vann, PT, DPT   Time Calculation: 14 mins

## 2017-04-06 NOTE — PROGRESS NOTES
End of Shift Nursing Note    Bedside shift change report given to Vivienne Nickerson RN (oncoming nurse) by Heather Chapman RN (offgoing nurse). Report included the following information SBAR, Kardex, Intake/Output and MAR. Zone Phone:   [phone]    Significant changes during shift:    None    Non-emergent issues for physician to address:   None      Number times ambulated in hallway past shift: 0      Number of times OOB to chair past shift: 3    POD #:      Vital Signs:    Temp: 98 °F (36.7 °C)     Pulse (Heart Rate): 64     BP: 125/72     Resp Rate: 18     O2 Sat (%): 95 %    Lines & Drains:     Urinary Catheter? No   Placement Date:    Medical Necessity:   Central Line? No   Placement Date:    Medical Necessity:   PICC Line? No   Placement Date:    Medical Necessity:     NG tube [] in [] removed [x] not applicable   Drains [] in [] removed [x] not applicable     Skin Integrity:      Wounds: no   Dressings Present: no    Wound Concerns: no      GI:    Current diet:  DIET DIABETIC CONSISTENT CARB Regular    Nausea: NO  Vomiting: NO  Bowel Sounds: YES  Flatus: YES  Last Bowel Movement: yesterday   Appearance:     Respiratory:  Supplemental O2: No      Device:    via  Liters/min     Incentive Spirometer: NO  Volume:   Coughing and Deep Breathing: YES  Oral Care: YES  Understanding (patient/family education): YES   Getting out of bed: YES  Head of bed elevation: YES    Patient Safety:    Falls Score: Mobility Score: 2  Bed Alarm On? No  Sitter? No      Opportunity for questions and clarification was given to oncoming nurse. Patient bed is in fowlers position, side rails are up x 2, door & observation blinds open as needed, call bell within reach and patient not in distress.     Theadora Moment

## 2017-04-06 NOTE — PROGRESS NOTES
SW noted that Pt is scheduled for discharge today. No discharge needs. Pt indicated that her son will transport her home in the car. He should be here between 1-2 PM today. SW offered to make MD follow up appointments but Pt declined and will make MD appointments herself.       Oswaldo Hale, [address] [place] [address] [address]

## 2017-04-06 NOTE — PROGRESS NOTES
Occupational Therapy EVALUATION/discharge  Patient: Lola Alvarez (62 y.o. female)  Date: 4/6/2017  Primary Diagnosis: UTI (urinary tract infection)        Precautions:        ASSESSMENT:   Based on the objective data described below, the patient presents at an overall independent to modified independent level for basic ADL. She is limited by new pain in her R LE. Noted some edema but not warm to the touch. Nursing is aware. Once her foot pain is addressed, she will be completely independent in all areas. Further skilled acute occupational therapy is not indicated at this time. Discharge Recommendations: None  Further Equipment Recommendations for Discharge: None      SUBJECTIVE:   Patient stated I don't know what happened to it.  (her foot)    OBJECTIVE DATA SUMMARY:   HISTORY:   Past Medical History:   Diagnosis Date    Adverse effect of anesthesia     sleep apnea cpap machine useage     Anemia     Arthritis     DDD low back and knees    Asthma     uses albuterol prn only; none x 6 mos.   Never hospitalized    BRCA negative 1/2013    Calculus of kidney     Chronic kidney disease     kidney stones    Chronic pain     knee pain bilat    CINV (chemotherapy-induced nausea and vomiting) 8/21/2014    Diabetes ([address] Age 46    IDDM    Environmental allergies     Fibromyalgia     GERD (gastroesophageal reflux disease)     controlled with med    Hypertension     Ill-defined condition     Sepsis 9-2015    Ill-defined condition 2016    chemotherapy     Morbid obesity ([address]     Other and unspecified hyperlipidemia     Ovarian cancer ([address] 9/2012, 1/2014    serous, high grade, stage IIIc. s/p chemotx (has port)    Psychiatric disorder     Rectal bleeding     Thromboembolus ([address] 1993    POST PARTUM; resolved- PELVIS    Thyroid disease     HYPOTHYROID    Unspecified sleep apnea     CPAP, Dr. Benjamine Cogan     Past Surgical History:   Procedure Laterality Date    BREAST SURGERY PROCEDURE UNLISTED Lymph node in left breast 2017    CHEST SURGERY PROCEDURE UNLISTED      Placement of port a cath right chest    HX GYN       X2    HX HYSTERECTOMY  2012    ROULA/BSO, tumor debulking, omentectomy for ovarian cancer    HX ORTHOPAEDIC      ankle-FX, R    HX ORTHOPAEDIC      FX R ARM    HX UROLOGICAL      stent    HX VASCULAR ACCESS  2015    right chest- port placed       Prior Level of Function/Home Situation: Independent; has two children (30 and 21years old) living with her. Still driving and managing all IADL independently  Expanded or extensive additional review of patient history:     Home Situation  Home Environment: Private residence  # Steps to Enter: 8  Rails to Enter: Yes  Hand Rails : Bilateral  One/Two Story Residence: Two story  Living Alone: No  Support Systems: Child(leonor), Family member(s)  Patient Expects to be Discharged to[de-identified] Private residence  Current DME Used/Available at Home: CPAP, Cane, quad, Walker, rollator, Commode, bedside, Nebulizer  Tub or Shower Type: Shower  [x]  Right hand dominant   []  Left hand dominant    EXAMINATION OF PERFORMANCE DEFICITS:  Cognitive/Behavioral Status:  Neurologic State: Alert  Orientation Level: Oriented X4  Cognition: Appropriate decision making; Follows commands  Perception: Appears intact  Perseveration: No perseveration noted  Safety/Judgement: Awareness of environment;Good awareness of safety precautions; Insight into deficits    Edema: R LE    Hearing: Auditory  Auditory Impairment: None    Vision/Perceptual:                           Acuity: Within Defined Limits    Corrective Lenses: Glasses    Range of Motion:  AROM: Within functional limits                         Strength:  Strength: Generally decreased, functional                Coordination:     Fine Motor Skills-Upper: Left Intact; Right Intact    Gross Motor Skills-Upper: Left Intact; Right Intact    Tone & Sensation:  Tone: Normal  Sensation:  (tingling/numbness in B feet due to neuropathy)                      Balance:  Sitting: Intact  Standing: Intact    Functional Mobility and Transfers for ADLs:  Bed Mobility:  Rolling: Independent  Supine to Sit: Independent  Scooting: Independent    Transfers:  Sit to Stand: Modified independent  Stand to Sit: Modified independent  Toilet Transfer : Modified independent    ADL Assessment:  Feeding: Independent    Oral Facial Hygiene/Grooming: Independent    Bathing: Modified independent    Upper Body Dressing: Modified independent    Lower Body Dressing: Modified independent    Toileting: Modified independent                Functional Measure:  Barthel Index:    Bathin  Bladder: 10  Bowels: 10  Groomin  Dressing: 10  Feeding: 10  Mobility: 15  Stairs: 10  Toilet Use: 10  Transfer (Bed to Chair and Back): 15  Total: 100       Barthel and G-code impairment scale:  Percentage of impairment CH  0% CI  1-19% CJ  20-39% CK  40-59% CL  60-79% CM  80-99% CN  100%   Barthel Score 0-100 100 99-80 79-60 59-40 20-39 1-19   0   Barthel Score 0-20 20 17-19 13-16 9-12 5-8 1-4 0      The Barthel ADL Index: Guidelines  1. The index should be used as a record of what a patient does, not as a record of what a patient could do. 2. The main aim is to establish degree of independence from any help, physical or verbal, however minor and for whatever reason. 3. The need for supervision renders the patient not independent. 4. A patient's performance should be established using the best available evidence. Asking the patient, friends/relatives and nurses are the usual sources, but direct observation and common sense are also important. However direct testing is not needed. 5. Usually the patient's performance over the preceding 24-48 hours is important, but occasionally longer periods will be relevant. 6. Middle categories imply that the patient supplies over 50 per cent of the effort. 7. Use of aids to be independent is allowed.     Selena Cee., Barthel, DJanethW. (1965). Functional evaluation: the Barthel Index. [facility] (14)2. JAMES Motley, Rosalie Rosado, Maribel Hernandez., [place], [address] (1999). Measuring the change indisability after inpatient rehabilitation; comparison of the responsiveness of the Barthel Index and Functional [place] Measure. Journal of Neurology, Neurosurgery, and Psychiatry, 66(4), 985-413. HILLARY Kyle, DONTA Kim, & Melina Logan M.A. (2004.) Assessment of post-stroke quality of life in cost-effectiveness studies: The usefulness of the Barthel Index and the EuroQoL-5D. Quality of Life Research, 13, 296-02       G codes: In compliance with CMSs Claims Based Outcome Reporting, the following G-code set was chosen for this patient based on their primary functional limitation being treated: The outcome measure chosen to determine the severity of the functional limitation was the Barthel Index with a score of 100/100 which was correlated with the impairment scale. ?  Self Care:     - CURRENT STATUS: CH - 0% impaired, limited or restricted    - GOAL STATUS: CH - 0% impaired, limited or restricted    - D/C STATUS:  CH - 0% impaired, limited or restricted     Occupational Therapy Evaluation Charge Determination   History Examination Decision-Making   LOW Complexity : Brief history review  LOW Complexity : 1-3 performance deficits relating to physical, cognitive , or psychosocial skils that result in activity limitations and / or participation restrictions  LOW Complexity : No comorbidities that affect functional and no verbal or physical assistance needed to complete eval tasks       Based on the above components, the patient evaluation is determined to be of the following complexity level: LOW   Pain:  9/10 in her R foot and calf; nursing aware                 Activity Tolerance:   Good  After treatment:   [x]  Patient left in no apparent distress sitting up in chair  []  Patient left in no apparent distress in bed  [x]  Call bell left within reach  [x]  Nursing notified  []  Caregiver present  []  Bed alarm activated    COMMUNICATION/EDUCATION:   Communication/Collaboration:  [x]      Home safety education was provided and the patient/caregiver indicated understanding. [x]      Patient/family have participated as able and agree with findings and recommendations. []      Patient is unable to participate in plan of care at this time.   Findings and recommendations were discussed with: Physical Therapist and Registered Nurse    Manasa Escalante OTR/L  Time Calculation: 14 mins

## 2017-04-06 NOTE — TELEPHONE ENCOUNTER
Script not on pt formulary was faxed over from [facility]. Qty 60.0 GM.--. NYSTOP  100,000 units /GM powder. Apply to affected area 2 times a day for 10 days. Thanks!

## 2017-04-06 NOTE — DIABETES MGMT
DTC Progress Note    Recommendations/ Comments: Please consider decreasing lantus to 30units Qhs secondary to hypoglycemia this morning. [facility]. Patient is a 55 y.o. female with known history of Type 2 Diabetes on Levemir 30 units qam and 50 units qpm, Novolog 20 units ac tid and Victoza 0.3mL daily at home. A1c:   Lab Results   Component Value Date/Time    Hemoglobin A1c 6.0 04/03/2017 10:25 AM    Hemoglobin A1c 6.4 02/27/2017 11:39 AM       Recent Glucose Results:   Lab Results   Component Value Date/Time    GLU 91 04/06/2017 05:27 AM    GLUCPOC 83 04/06/2017 08:02 AM    GLUCPOC 91 04/05/2017 08:45 PM    GLUCPOC 72 04/05/2017 05:31 PM        Lab Results   Component Value Date/Time    Creatinine 1.06 04/06/2017 05:27 AM       Active Orders   Diet    DIET DIABETIC CONSISTENT CARB Regular        PO intake:   Patient Vitals for the past 72 hrs:   % Diet Eaten   04/04/17 1501 100 %   04/04/17 0800 100 %       Current hospital DM medication:   -Lantus 20 units qam, 40 units hs  -Humalog normal sensitivity correction     Will continue to follow as needed.     Thank you  JOCELYN Marion, RN, [address]

## 2017-04-06 NOTE — DISCHARGE SUMMARY
Discharge Summary  Name: Harvey Moise  [id number]  YOB: 1960 (Age: 55 y.o.)   Date of Admission: 4/3/2017  Date of Discharge: 4/6/2017  Attending Physician: No att. 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  female w pmhx significant for ovarian ca, htn, asthma, T2DM present to ED c/o generalized weakness associated with fver 101.7 at home. Other associated symptoms including chills, poor appetite, and nausea. Patient was seen at urology clinic today with stent removal and was sent to ED for further evaluation when she appeared ill-ed. She has hx of ovarian ca with chemo 2 weeks ago in addition to blood transfusion on 3/31. She also complains of chronic sob that is worsening. In the ED, vitals stable. UA consistent with UTI. Labs: wbc 15, cr 1.88 ,baseline cr 1.35  We were asked to admit for work up and evaluation of the above problems. Brief Hospital Course by Main Problems:   Sepsis  Complicated UTI  Leukocytosis, improving  Pt with hx of L renal stone s/p cystoscopy, b/l retrograde pyelogarms and L ureterorenoscopy with laser lithotripsy and stent s/p stent removal on 4/3. She presented with fever of 101.7, leukocytosis of 15.  UA is consistent with UTI, however urine cx BC showed klebsiella pneumoniae. Repeat BC remained NTD. Patient will be discharged on keflex to complete 14 day course.        Acute kidney Injury, improving  -likely secondary to volume depletion in the setting of poor po intake and fever now resolved.      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  Pt follows with Dr Neto Matos Onc      Acute on chronic respiratory failure  LE edema L>R  Mild Pulm HTn (40mmhg)  -stable on RA. cxr neg, v/q scan low probability for pe. SHABANA/asthma, continue home CPAP      T2DM, resuming home insulin    Discharge Exam:  Patient seen and examined by me on discharge day. Pertinent Findings:  Visit Vitals    /76    Pulse 74    Temp 97.9 °F (36.6 °C)    Resp 24    Ht 5' 9\" (1.753 m)    Wt 149.9 kg (330 lb 7.5 oz)    LMP 09/07/2011    SpO2 96%    BMI 48.8 kg/m2     Gen:    Not in distress  Chest: Clear lungs  CVS:   Regular rhythm. No edema  Abd:  Soft, not distended, not tender    Discharge/Recent Laboratory Results:  No results for input(s): NA, K, CL, CO2, BUN, GLU, CA, PHOS, MG in the last 72 hours. No lab exists for component: CREATININE  No results for input(s): HGB, HCT, WBC, PLT, HGBEXT, HCTEXT, PLTEXT, HGBEXT, HCTEXT, PLTEXT in the last 72 hours. Discharge Medications:  Discharge Medication List as of 4/6/2017 12:55 PM      START taking these medications    Details   nitrofurantoin (MACRODANTIN) 100 mg capsule Take 1 Cap by mouth two (2) times a day for 5 days. , Print, Disp-10 Cap, R-0      cefUROXime (CEFTIN) 500 mg tablet Take 1 Tab by mouth two (2) times a day for 12 days. , Print, Disp-24 Tab, R-0         CONTINUE these medications which have NOT CHANGED    Details   cycloSPORINE (RESTASIS) 0.05 % ophthalmic emulsion 1 Drop two (2) times a day., Historical Med      OTHER Vitamin called easy promise takes two after dinner, Historical Med      multivitamin (ONE A DAY) tablet Take 2 Tabs by mouth daily (after dinner). , Historical Med      HYDROcodone-acetaminophen (NORCO) 5-325 mg per tablet Take 1 Tab by mouth every four (4) hours as needed for Pain.  Max Daily Amount: 6 Tabs., Print, Disp-20 Tab, R-0      sertraline (ZOLOFT) 50 mg tablet TAKE 1 TABLET BY MOUTH EVERY MORNING, Historical Med, R-1      clonazePAM (KLONOPIN) 1 mg tablet TAKE 1 TABLET BY MOUTH TWICE A DAY, Historical Med, R-1      methocarbamol (ROBAXIN) 500 mg tablet Take  by mouth three (3) times daily. , Historical Med      ondansetron (ZOFRAN ODT) 8 mg disintegrating tablet Take 1 Tab by mouth every eight (8) hours as needed for Nausea., Normal, Disp-30 Tab, R-1      valsartan (DIOVAN) 160 mg tablet TAKE 1 TAB BY MOUTH DAILY. REPLACES VALSARTAN-HCTZ, Normal, Disp-30 Tab, R-11      LEVEMIR FLEXTOUCH 100 unit/mL (3 mL) inpn 30 UNITS IN THE MORNING AND 50 UNITS AT NIGHT. INCREASE AS DIRECTED TO MAX  UNITS PER DAY, Normal, Disp-30 mL, R-11      montelukast (SINGULAIR) 10 mg tablet Take 1 Tab by mouth daily. , Normal, Disp-90 Tab, R-2      levothyroxine (SYNTHROID) 150 mcg tablet TAKE 1 TAB BY MOUTH DAILY (BEFORE BREAKFAST). , Normal, Disp-90 Tab, R-3      Lancets misc Use as directed to test blood sugar three times daily-DX-DM-250.00, Normal, Disp-270 Each, R-3      fluticasone-salmeterol (ADVAIR HFA) 230-21 mcg/actuation inhaler Take 2 Puffs by inhalation two (2) times a day., Normal, Disp-12 g, R-5      BD INSULIN PEN NEEDLE UF SHORT 31 gauge x 5/16\" ndle USE WITH INSULIN TO INJECT FIVE TIMES DAILY., Normal, Disp-200 Pen Needle, R-11      albuterol (PROVENTIL VENTOLIN) 2.5 mg /3 mL (0.083 %) nebulizer solution 3 mL by Nebulization route every four (4) hours as needed for Wheezing., Normal, Disp-24 Each, R-11      Liraglutide (VICTOZA) 0.6 mg/0.1 mL (18 mg/3 mL) sub-q pen 0.3 mL by SubCUTAneous route daily (with lunch). , Normal, Disp-3 Each, R-11      NOVOLOG FLEXPEN 100 unit/mL inpn INJECT 20 UNITS BEFORE EACH MEAL + 4 UNITS FOR EVERY 50 MG/DL ABOVE 150 MG/DL.  UNITS PER DAY, Normal, Disp-30 mL, R-11      potassium chloride SR (KLOR-CON 10) 10 mEq tablet Take 1 Tab by mouth two (2) days a week., Normal, Disp-30 Tab, R-1      cholecalciferol, VITAMIN D3, (VITAMIN D3) 5,000 unit tab tablet Take  by mouth daily. , Historical Med      furosemide (LASIX) 40 mg tablet Take 80 mg by mouth as needed., Historical Med, R-3      Blood-Glucose Meter monitoring kit Use as directed to test blood sugar three times daily-DX-DM-250.00, Normal, Disp-1 Kit, R-0      albuterol (PROVENTIL HFA, VENTOLIN HFA, PROAIR HFA) 90 mcg/actuation inhaler Take 2 Puffs by inhalation every four (4) hours as needed for Wheezing., Historical Med      fexofenadine (ALLEGRA) 180 mg tablet Take 180 mg by mouth daily.   , Historical Med      mometasone (NASONEX) 50 mcg/actuation nasal spray 2 sprays by Both Nostrils route as needed., Historical Med             DISPOSITION:    Home with Family: x   Home with HH/PT/OT/RN:    SNF/LTC:    DELICIA:    OTHER:          Follow up with:   PCP : Rickie Rodriguez MD  Follow-up Information     Follow up With Details Nam Ortega MD In 5 days  [address] [place] [address]  [phone]      Naomi Hedrick MD In 2 weeks  [facility] [address]  [address] [facility]      Zena Mason MD In 5 days  [facility] [address]  [phone]            Diet: diabetic    Total time in minutes spent coordinating this discharge (includes going over instructions, follow-up, prescriptions, and preparing report for sign off to her PCP) :  35 minutes

## 2017-04-09 LAB
BACTERIA SPEC CULT: NORMAL
BACTERIA SPEC CULT: NORMAL
SERVICE CMNT-IMP: NORMAL
SERVICE CMNT-IMP: NORMAL

## 2017-04-10 ENCOUNTER — PATIENT OUTREACH (OUTPATIENT)
Dept: INTERNAL MEDICINE CLINIC | Age: 57
End: 2017-04-10

## 2017-04-10 NOTE — PROGRESS NOTES
NNTOCIP Post Hospitalization           [place] [facility] Patient [facility] on 4/3/17 and discharged on 4/6/17 with diagnosis of Complicated UTI, Sepsis, Leukocytosis, FELICIA. 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 Reynaldo Trinh (Gyn/Onc).       Significant Lab/Diagnostic Findings:  Lab Results  Component Value Date/Time   WBC 11.6 04/04/2017 06:22 AM   Hemoglobin (POC) 10.5 03/20/2017 12:15 PM   HGB 7.8 04/04/2017 06:22 AM   Hematocrit (POC) 31 03/20/2017 12:15 PM   HCT 25.0 04/04/2017 06:22 AM   PLATELET 299 21/85/3017 06:22 AM   MCV 89.9 04/04/2017 06:22 AM       Lab Results   Component Value Date/Time    INR 1.1 04/03/2017 10:25 AM    INR 1.0 01/16/2015 12:15 PM    INR 1.0 03/25/2012 08:10 PM    Prothrombin time 11.1 04/03/2017 10:25 AM    Prothrombin time 10.3 01/16/2015 12:15 PM    Prothrombin time 10.3 03/25/2012 08:10 PM      Lab Results   Component Value Date/Time    CK - MB <1.0 03/12/2017 03:54 PM    CK-MB Index Cannot be calulated 03/12/2017 03:54 PM    Troponin-I, Qt. <0.04 04/03/2017 10:25 AM    BNP 65 07/07/2015 11:46 AM      Lab Results   Component Value Date/Time    BNP 65 07/07/2015 11:46 AM    NT pro- 04/03/2017 10:25 AM    NT pro-BNP 31 11/14/2009 10:30 PM      Lab Results   Component Value Date/Time    Sodium 143 04/06/2017 05:27 AM    Potassium 4.1 04/06/2017 05:27 AM    Chloride 111 04/06/2017 05:27 AM    CO2 22 04/06/2017 05:27 AM    Anion gap 10 04/06/2017 05:27 AM    Glucose 91 04/06/2017 05:27 AM    BUN 18 04/06/2017 05:27 AM    Creatinine 1.06 04/06/2017 05:27 AM    BUN/Creatinine ratio 17 04/06/2017 05:27 AM    GFR est AA >60 04/06/2017 05:27 AM    GFR est non-AA 54 04/06/2017 05:27 AM    Calcium 8.8 04/06/2017 05:27 AM      Lab Results   Component Value Date/Time    Sodium 143 04/06/2017 05:27 AM    Potassium 4.1 04/06/2017 05:27 AM    Chloride 111 04/06/2017 05:27 AM    CO2 22 04/06/2017 05:27 AM    Anion gap 10 04/06/2017 05:27 AM    Glucose 91 04/06/2017 05:27 AM    BUN 18 04/06/2017 05:27 AM    Creatinine 1.06 04/06/2017 05:27 AM    BUN/Creatinine ratio 17 04/06/2017 05:27 AM    GFR est AA >60 04/06/2017 05:27 AM    GFR est non-AA 54 04/06/2017 05:27 AM    Calcium 8.8 04/06/2017 05:27 AM    Bilirubin, total 0.8 04/03/2017 10:25 AM    ALT (SGPT) 25 04/03/2017 10:25 AM    AST (SGOT) 36 04/03/2017 10:25 AM    Alk. phosphatase 87 04/03/2017 10:25 AM    Protein, total 9.0 04/03/2017 10:25 AM    Albumin 2.9 04/03/2017 10:25 AM    Globulin 6.1 04/03/2017 10:25 AM    A-G Ratio 0.5 04/03/2017 10:25 AM      Lab Results   Component Value Date/Time    Hemoglobin A1c 6.0 04/03/2017 10:25 AM    Hemoglobin A1c (POC) 6.9 10/15/2015 12:00 PM              Component      Latest Ref Rng & Units 4/3/2017 4/3/2017           1:26 PM 12:00 PM   D-dimer      0.00 - 0.65 mg/L FEU  8.17 (H)   Lactic acid      0.4 - 2.0 MMOL/L 1.2        Blood Cultures - Final Results  Component      Latest Ref Rng & Units 4/4/2017 4/3/2017           4:15 PM  1:47 PM   Special Requests:       NO SPECIAL REQUESTS NO SPECIAL REQUESTS   Culture result:       NO GROWTH 5 DAYS NO GROWTH 6 DAYS     Component      Latest Ref Rng & Units 4/3/2017           1:26 PM   Special Requests:       NO SPECIAL REQUESTS   Culture result:       KLEBSIELLA PNEUMONIAE (A) . . .       Urine Culture:  Component      Latest Ref Rng & Units 4/3/2017           6:15 PM   Special Requests:          [place] Count          Culture result:       GRAM NEGATIVE RODS (A) . . .     Component      Latest Ref Rng & Units 4/3/2017           6:15 PM   Special Requests:       NO SPECIAL REQUESTS   [place] Count       [id number] . . .   Culture result:       ENTEROCOCCUS FAECALIS GROUP D (A)           NN has updated care team members in the patient's chart.           RRAT score:   Medium Risk            13       Total Score        4 More than 1 Admission in calendar year    9 Charlson Comorbidity Score        Criteria that do not apply:    Relationship with PCP    Patient Living Status    Patient Length of Stay > 5    Patient Insurance is Medicare, Medicaid or Self Pay                  Advance Medical Directive on file in EMR? Yes; has an advanced directive - a copy has been provided. Patient was evaluated by care management during hospitalization. Per notes, Discharge Disposition from [facility]: Home. Recommended Post-Hospital Discharge follow-up (see below as listed on Hospital Discharge AVS/Instructions). Follow-up Information     Follow up With Details Comments Contact Info     Dee Kaba MD In 5 days   [address]  [address]  [address]  [address]  [phone]     Geremias Ramos MD In 2 weeks   [address]  [place]  [address]  [phone]     Baldo Nunn MD In 5 days   [address] [facility] [address]  [facility] [address] [address]  [phone]                 Most recent HIPAA form in the patient's chart (dated 4/7/16) was reviewed; authorized individual(s) listed on HIPAA form include [place]. NN follow-up phone call - First Attempt  NN contacted the patient today to complete NN post-hospital discharge assessment. Two patient identifiers verified. NN introduced self to the patient, including NN role and purpose of NN follow-up phone call; patient verbalizes understanding. Patient reports that she is currently driving and requests to return phone call to this NN at a later time. New medications at discharge include: Ceftin, Macrodantin  Medication(s) changed at hospital discharge include: N/A  Medication(s) discontinued at hospital discharge include: N/A  Patient able to fill/pickup new medications without difficulty: *Unable to assess at this time. Any Questions/Concerns regarding new Medication(s) and/or Medication Change(s): *Unable to assess at this time.           NN will route this encounter to Dr. Joo Gibbons for notification/review. This note will not be viewable in [address]

## 2017-04-25 ENCOUNTER — OFFICE VISIT (OUTPATIENT)
Dept: GYNECOLOGY | Age: 57
End: 2017-04-25

## 2017-04-25 VITALS
HEART RATE: 95 BPM | HEIGHT: 69 IN | WEIGHT: 293 LBS | DIASTOLIC BLOOD PRESSURE: 99 MMHG | SYSTOLIC BLOOD PRESSURE: 144 MMHG | BODY MASS INDEX: 43.4 KG/M2

## 2017-04-25 DIAGNOSIS — E66.01 MORBID OBESITY DUE TO EXCESS CALORIES (HCC): ICD-10-CM

## 2017-04-25 DIAGNOSIS — R97.1 ELEVATED CA-125: ICD-10-CM

## 2017-04-25 DIAGNOSIS — C56.9 OVARIAN CANCER, UNSPECIFIED LATERALITY (HCC): Primary | ICD-10-CM

## 2017-04-25 DIAGNOSIS — C80.0 CARCINOMATOSIS (HCC): Primary | ICD-10-CM

## 2017-04-25 RX ORDER — BUDESONIDE AND FORMOTEROL FUMARATE DIHYDRATE 160; 4.5 UG/1; UG/1
2 AEROSOL RESPIRATORY (INHALATION)
COMMUNITY
Start: 2017-04-24 | End: 2017-12-26 | Stop reason: SDUPTHER

## 2017-04-25 RX ORDER — OMEGA-3-ACID ETHYL ESTERS 1 G/1
CAPSULE, LIQUID FILLED ORAL
Refills: 3 | COMMUNITY
Start: 2017-01-25 | End: 2017-12-05

## 2017-04-25 NOTE — PROGRESS NOTES
pre chemo appt, labs to be drawn today, pt has in infected tooth that needs to be pulled, needs a note for consent that tooth can be pulled

## 2017-04-25 NOTE — PROGRESS NOTES
[facility] [address], [address]  [phone]  MD John Gleason MD      Patient ID:  Name: Bonnie Quiñonez  MRN: [id number]  : 1960/53 y.o. Visit date: 2017     Acute Diagnosis:   Vaginal spotting   Persistent EOC    HISTORY:  49 y.o.  female with a diagnosis of ovarian cancer. Her original surgery: 12 Ex-lap, ROULA/BSO, radical tumor debulking. Pathology:  High grade serious ovarian cancer with omental cake and carcinomatosis. Stage IIIC. She received Taxol/carboplatin (10/2012 - 2013). She recurred in mid  noted CT with Peritoneal carcinomatosis with slightly increased size of the peritoneal implants throughout the abdomen, Pericardial lymph nodes unchanged and Bilateral pelvic adenopathy increased in size. Initiated retreatment with Taxol/carboplatin (2014-2014) with HSR. She was then monitored until progression, initiated on Doxil/Avastin (3/2014), did poorly, though MRI following cycle 3 showed extensive progression. She has receive Topotecan palliation. Component      Latest Ref Rng & Units 3/9/2017 2017 2017 2017           3:52 PM  3:00 PM  1:48 PM  1:37 PM   CA-125      0 - 30 U/mL 181 (H)      Cancer Ag (CA) 125      0.0 - 38.1 U/mL  259.0 (H) 347.1 (H) 353.3 (H)     Component      Latest Ref Rng & Units 2016 10/17/2016           1:54 PM  2:48 PM   CA-125      0 - 30 U/mL 265 (H) 176 (H)   Cancer Ag (CA) 125      0.0 - 38.1 U/mL       Recent initiation of IV Gemzar/Avastin---#2 3/2/2017    Recent ER visit with renal calculi has delayed the initiation of IV Gemzar Avastin. Stents, Readmission with fever. EOD CT: 2017  FINDINGS:   Solid organ evaluation is limited without contrast.      Multiple scattered intraperitoneal soft tissue nodules are overall increased.  A  representative soft tissue mass in the pelvis adjacent to the rectum measures  3.6 x 3.8 cm (series 3, image 77), previously 1.6 x 1.9 cm. A representative  soft tissue nodule in the left hemipelvis measures 3.2 x 3.3 cm (series 3, image  70), previously 2.5 x 2.9 cm. An adjacent soft tissue nodule laterally measures  3.4 x 4.7 cm (series 3, image 72), previously 2.7 x 4.2 cm. A representative  conglomerate of soft tissue nodules adjacent to the pancreatic tail measures 3.6  x 4.8 cm (series 3, image 21), previously 3.2 x 3.4 cm.     A right inguinal lymph node measures 2.2 x 3.1 cm (series 3, image 83),  previously 1.8 x 2.6 cm.      The visualized lung bases demonstrate no mass or consolidation. The heart size  is normal. There is no pericardial or pleural effusion.     A 1.2 cm calculus is again seen in the left renal pelvis with new mild left  hydronephrosis and mild fat stranding adjacent to the left renal hilum. Several  small nonobstructing calculi are also seen bilaterally. The urinary bladder is  grossly normal.     The liver, spleen, pancreas, and adrenal glands are normal. The gall bladder is  present without intra- or extra-hepatic biliary dilatation.      There are no dilated bowel loops. There is no free fluid or free air. The aorta  tapers without aneurysm.     The uterus and ovaries are surgically absent. There is no pelvic mass. There are  stable degenerative changes in the spine. There is no aggressive blastic or  lytic bony lesion.     IMPRESSION  IMPRESSION:   1. Overall progression of metastatic disease with multiple intraperitoneal soft  tissue implants. There is no ascites. There is also increased right inguinal  lymphadenopathy.     2. At 1.2 cm calculus is again seen in the left renal collecting system. There  is new mild left hydronephrosis and inflammation adjacent to the left renal  hilum. Correlation with urinalysis for infection is suggested.         Denies dysuria, hematuria. Urinary incontinence continues, vaginal discharge, N/V, F/C.    Ongoing dental evaluation for ? ? infection. Placed on antibiotics  Negative  and GI review for dysfunction  Negative cardiopulmonary review  No weight loss    PMH:  Past Medical History:   Diagnosis Date    Adverse effect of anesthesia     sleep apnea cpap machine useage     Anemia     Arthritis     DDD low back and knees    Asthma     uses albuterol prn only; none x 6 mos. Never hospitalized    BRCA negative 2013    Calculus of kidney     Chronic kidney disease     kidney stones    Chronic pain     knee pain bilat    CINV (chemotherapy-induced nausea and vomiting) 2014    Diabetes ([facility] [address] Age 46    IDDM    Environmental allergies     Fibromyalgia     GERD (gastroesophageal reflux disease)     controlled with med    Hypertension     Ill-defined condition     Sepsis     Ill-defined condition 2016    chemotherapy     Morbid obesity ([address]     Other and unspecified hyperlipidemia     Ovarian cancer ([facility] [address] 2012, 2014    serous, high grade, stage IIIc. s/p chemotx (has port)    Psychiatric disorder     Rectal bleeding     Thromboembolus ([facility] [address]     POST PARTUM; resolved- PELVIS    Thyroid disease     HYPOTHYROID    Unspecified sleep apnea     CPAP, Dr. Kae Mosley     PSH:  Past Surgical History:   Procedure Laterality Date    BREAST SURGERY PROCEDURE UNLISTED      Lymph node in left breast 2017    CHEST SURGERY PROCEDURE UNLISTED      Placement of port a cath right chest    HX GYN       X2    HX HYSTERECTOMY  2012    ROULA/BSO, tumor debulking, omentectomy for ovarian cancer    HX ORTHOPAEDIC      ankle-FX, R    HX ORTHOPAEDIC      FX R ARM    HX UROLOGICAL      stent    HX VASCULAR ACCESS  2015    right chest- port placed     Medications:  Current Outpatient Prescriptions on File Prior to Visit   Medication Sig Dispense Refill    Liraglutide (VICTOZA) 0.6 mg/0.1 mL (18 mg/3 mL) sub-q pen 0.3 mL by SubCUTAneous route daily (with lunch).  3 Each 11    cycloSPORINE (RESTASIS) 0.05 % ophthalmic emulsion 1 Drop two (2) times a day.  OTHER Vitamin called easy promise takes two after dinner      multivitamin (ONE A DAY) tablet Take 2 Tabs by mouth daily (after dinner).  HYDROcodone-acetaminophen (NORCO) 5-325 mg per tablet Take 1 Tab by mouth every four (4) hours as needed for Pain. Max Daily Amount: 6 Tabs. 20 Tab 0    clonazePAM (KLONOPIN) 1 mg tablet TAKE 1 TABLET BY MOUTH TWICE A DAY  1    methocarbamol (ROBAXIN) 500 mg tablet Take  by mouth three (3) times daily.  valsartan (DIOVAN) 160 mg tablet TAKE 1 TAB BY MOUTH DAILY. REPLACES VALSARTAN-HCTZ 30 Tab 11    LEVEMIR FLEXTOUCH 100 unit/mL (3 mL) inpn 30 UNITS IN THE MORNING AND 50 UNITS AT NIGHT. INCREASE AS DIRECTED TO MAX  UNITS PER DAY 30 mL 11    levothyroxine (SYNTHROID) 150 mcg tablet TAKE 1 TAB BY MOUTH DAILY (BEFORE BREAKFAST). 90 Tab 3    Lancets misc Use as directed to test blood sugar three times daily-DX-DM-250.00 270 Each 3    BD INSULIN PEN NEEDLE UF SHORT 31 gauge x 5/16\" ndle USE WITH INSULIN TO INJECT FIVE TIMES DAILY. 200 Pen Needle 11    NOVOLOG FLEXPEN 100 unit/mL inpn INJECT 20 UNITS BEFORE EACH MEAL + 4 UNITS FOR EVERY 50 MG/DL ABOVE 150 MG/DL.  UNITS PER DAY 30 mL 11    potassium chloride SR (KLOR-CON 10) 10 mEq tablet Take 1 Tab by mouth two (2) days a week. 30 Tab 1    cholecalciferol, VITAMIN D3, (VITAMIN D3) 5,000 unit tab tablet Take  by mouth daily.  furosemide (LASIX) 40 mg tablet Take 80 mg by mouth as needed. 3    Blood-Glucose Meter monitoring kit Use as directed to test blood sugar three times daily-DX-DM-250.00 1 Kit 0    nystatin (NYSTOP) powder Apply to affected area 4 x daily. 30 g 3    sertraline (ZOLOFT) 50 mg tablet TAKE 1 TABLET BY MOUTH EVERY MORNING  1    ondansetron (ZOFRAN ODT) 8 mg disintegrating tablet Take 1 Tab by mouth every eight (8) hours as needed for Nausea. 30 Tab 1    montelukast (SINGULAIR) 10 mg tablet Take 1 Tab by mouth daily. 90 Tab 2    fluticasone-salmeterol (ADVAIR HFA) 230-21 mcg/actuation inhaler Take 2 Puffs by inhalation two (2) times a day. 12 g 5    albuterol (PROVENTIL VENTOLIN) 2.5 mg /3 mL (0.083 %) nebulizer solution 3 mL by Nebulization route every four (4) hours as needed for Wheezing. 24 Each 11    albuterol (PROVENTIL HFA, VENTOLIN HFA, PROAIR HFA) 90 mcg/actuation inhaler Take 2 Puffs by inhalation every four (4) hours as needed for Wheezing.  fexofenadine (ALLEGRA) 180 mg tablet Take 180 mg by mouth daily.  mometasone (NASONEX) 50 mcg/actuation nasal spray 2 sprays by Both Nostrils route as needed. No current facility-administered medications on file prior to visit. Allergies   Allergen Reactions    Carboplatin Other (comments)     Patient developed shortness of breath, felt faint, coughing, skin flushed and \"itchy\". This occurred on the patients 8th treatment.  Iodinated Contrast Media - Oral And Iv Dye Rash    Lipitor [Atorvastatin] Myalgia    Percocet [Oxycodone-Acetaminophen] Other (comments)     fever    Shellfish Containing Products Hives    Tape [Adhesive] Itching and Other (comments)     \"op site-- clear,thin tape\"--caused blister      ROS:  Negative    OBJECTIVE:  Vitals: Sammie Patel Vitals:    04/25/17 1421   BP: (!) 144/99   Pulse: 95   Weight: 320 lb 9.6 oz (145.4 kg)   Height: 5' 9\" (1.753 m)        Office Examination:    General appearance - alert, well appearing, and in no distress  Eyes - clear  Lymphatics - no palpable lymphadenopathy  Heart - normal rate and regular rhythm, S1 and S2 normal  Pulmonary - Clear to ausculation and percussion  Abdomen - rotund, soft, nontender, nondistended, no masses or organomegaly/fluid wave  Rectal - rectal exam not indicated  Back exam - no CVAT  Extrem: Mild upper extremity edema, L>R, good pulses.  Nontender      DATE REVIEW as available:  Lab Results   Component Value Date/Time    WBC 11.6 04/04/2017 06:22 AM    Hemoglobin (POC) 10.5 03/20/2017 12:15 PM    HGB 7.8 04/04/2017 06:22 AM    Hematocrit (POC) 31 03/20/2017 12:15 PM    HCT 25.0 04/04/2017 06:22 AM    PLATELET 541 22/02/7610 06:22 AM    MCV 89.9 04/04/2017 06:22 AM     Lab Results   Component Value Date/Time    Sodium 143 04/06/2017 05:27 AM    Potassium 4.1 04/06/2017 05:27 AM    Chloride 111 04/06/2017 05:27 AM    CO2 22 04/06/2017 05:27 AM    Anion gap 10 04/06/2017 05:27 AM    Glucose 91 04/06/2017 05:27 AM    BUN 18 04/06/2017 05:27 AM    Creatinine 1.06 04/06/2017 05:27 AM    BUN/Creatinine ratio 17 04/06/2017 05:27 AM    GFR est AA >60 04/06/2017 05:27 AM    GFR est non-AA 54 04/06/2017 05:27 AM    Calcium 8.8 04/06/2017 05:27 AM    Bilirubin, total 0.8 04/03/2017 10:25 AM    AST (SGOT) 36 04/03/2017 10:25 AM    Alk. phosphatase 87 04/03/2017 10:25 AM    Protein, total 9.0 04/03/2017 10:25 AM    Albumin 2.9 04/03/2017 10:25 AM    Globulin 6.1 04/03/2017 10:25 AM    A-G Ratio 0.5 04/03/2017 10:25 AM    ALT (SGPT) 25 04/03/2017 10:25 AM         IMPRESSION AND PLAN:    Haley Regalado has a working diagnosis of EOC. EOD CT and  elevations would be consistent with recurrence. She is S/P six cycles of Carbo/Taxol retreatment due to prolonged DFI. Elevated . CT suggestive of progressive disease. S/P Doxil/Avastin #3. S/P Topotecan #3, complicated by anemia and thrombocytopenia  S/P IV Gemzar/Avastin #2. Screening labs.   Continue IV Gemzar/Avastin    Ruth Macdonald MD  4/25/2017

## 2017-04-26 RX ORDER — GRANISETRON HYDROCHLORIDE 1 MG/ML
1 INJECTION, SOLUTION INTRAVENOUS ONCE
Status: COMPLETED | OUTPATIENT
Start: 2017-04-27 | End: 2017-04-27

## 2017-04-27 ENCOUNTER — HOSPITAL ENCOUNTER (OUTPATIENT)
Dept: INFUSION THERAPY | Age: 57
Discharge: HOME | End: 2017-04-27
Payer: MEDICARE

## 2017-04-27 VITALS
WEIGHT: 293 LBS | RESPIRATION RATE: 18 BRPM | BODY MASS INDEX: 43.4 KG/M2 | DIASTOLIC BLOOD PRESSURE: 90 MMHG | HEART RATE: 70 BPM | SYSTOLIC BLOOD PRESSURE: 138 MMHG | HEIGHT: 69 IN | OXYGEN SATURATION: 97 % | TEMPERATURE: 97.5 F

## 2017-04-27 LAB
ALBUMIN SERPL BCP-MCNC: 3.2 G/DL (ref 3.5–5)
ALBUMIN/GLOB SERPL: 0.6 {RATIO} (ref 1.1–2.2)
ALP SERPL-CCNC: 258 U/L (ref 45–117)
ALT SERPL-CCNC: 100 U/L (ref 12–78)
ANION GAP BLD CALC-SCNC: 9 MMOL/L (ref 5–15)
AST SERPL W P-5'-P-CCNC: 95 U/L (ref 15–37)
BASOPHILS # BLD AUTO: 0 K/UL (ref 0–0.1)
BASOPHILS # BLD: 0 % (ref 0–1)
BILIRUB SERPL-MCNC: 0.5 MG/DL (ref 0.2–1)
BUN SERPL-MCNC: 29 MG/DL (ref 6–20)
BUN/CREAT SERPL: 19 (ref 12–20)
CALCIUM SERPL-MCNC: 9.1 MG/DL (ref 8.5–10.1)
CHLORIDE SERPL-SCNC: 106 MMOL/L (ref 97–108)
CO2 SERPL-SCNC: 22 MMOL/L (ref 21–32)
CREAT SERPL-MCNC: 1.54 MG/DL (ref 0.55–1.02)
EOSINOPHIL # BLD: 0.2 K/UL (ref 0–0.4)
EOSINOPHIL NFR BLD: 3 % (ref 0–7)
ERYTHROCYTE [DISTWIDTH] IN BLOOD BY AUTOMATED COUNT: 15.3 % (ref 11.5–14.5)
GLOBULIN SER CALC-MCNC: 5.8 G/DL (ref 2–4)
GLUCOSE SERPL-MCNC: 157 MG/DL (ref 65–100)
HCT VFR BLD AUTO: 30.3 % (ref 35–47)
HGB BLD-MCNC: 9.5 G/DL (ref 11.5–16)
LYMPHOCYTES # BLD AUTO: 21 % (ref 12–49)
LYMPHOCYTES # BLD: 1.7 K/UL (ref 0.8–3.5)
MCH RBC QN AUTO: 28.9 PG (ref 26–34)
MCHC RBC AUTO-ENTMCNC: 31.4 G/DL (ref 30–36.5)
MCV RBC AUTO: 92.1 FL (ref 80–99)
MONOCYTES # BLD: 0.9 K/UL (ref 0–1)
MONOCYTES NFR BLD AUTO: 12 % (ref 5–13)
NEUTS SEG # BLD: 5 K/UL (ref 1.8–8)
NEUTS SEG NFR BLD AUTO: 64 % (ref 32–75)
PLATELET # BLD AUTO: 224 K/UL (ref 150–400)
POTASSIUM SERPL-SCNC: 3.9 MMOL/L (ref 3.5–5.1)
PROT SERPL-MCNC: 9 G/DL (ref 6.4–8.2)
RBC # BLD AUTO: 3.29 M/UL (ref 3.8–5.2)
SODIUM SERPL-SCNC: 137 MMOL/L (ref 136–145)
WBC # BLD AUTO: 7.7 K/UL (ref 3.6–11)

## 2017-04-27 PROCEDURE — 74011250636 HC RX REV CODE- 250/636

## 2017-04-27 PROCEDURE — 77030012965 HC NDL HUBR BBMI -A

## 2017-04-27 PROCEDURE — 36415 COLL VENOUS BLD VENIPUNCTURE: CPT | Performed by: OBSTETRICS & GYNECOLOGY

## 2017-04-27 PROCEDURE — 80053 COMPREHEN METABOLIC PANEL: CPT | Performed by: OBSTETRICS & GYNECOLOGY

## 2017-04-27 PROCEDURE — 96375 TX/PRO/DX INJ NEW DRUG ADDON: CPT

## 2017-04-27 PROCEDURE — 74011250636 HC RX REV CODE- 250/636: Performed by: OBSTETRICS & GYNECOLOGY

## 2017-04-27 PROCEDURE — 86304 IMMUNOASSAY TUMOR CA 125: CPT | Performed by: OBSTETRICS & GYNECOLOGY

## 2017-04-27 PROCEDURE — 74011250636 HC RX REV CODE- 250/636: Performed by: NURSE PRACTITIONER

## 2017-04-27 PROCEDURE — 85025 COMPLETE CBC W/AUTO DIFF WBC: CPT | Performed by: OBSTETRICS & GYNECOLOGY

## 2017-04-27 PROCEDURE — 96413 CHEMO IV INFUSION 1 HR: CPT

## 2017-04-27 PROCEDURE — 96361 HYDRATE IV INFUSION ADD-ON: CPT

## 2017-04-27 PROCEDURE — 96360 HYDRATION IV INFUSION INIT: CPT

## 2017-04-27 RX ORDER — SODIUM CHLORIDE 9 MG/ML
10 INJECTION INTRAMUSCULAR; INTRAVENOUS; SUBCUTANEOUS AS NEEDED
Status: DISPENSED | OUTPATIENT
Start: 2017-04-27 | End: 2017-04-28

## 2017-04-27 RX ORDER — HEPARIN 100 UNIT/ML
500 SYRINGE INTRAVENOUS AS NEEDED
Status: ACTIVE | OUTPATIENT
Start: 2017-04-27 | End: 2017-04-28

## 2017-04-27 RX ORDER — SODIUM CHLORIDE 0.9 % (FLUSH) 0.9 %
10-40 SYRINGE (ML) INJECTION AS NEEDED
Status: ACTIVE | OUTPATIENT
Start: 2017-04-27 | End: 2017-04-28

## 2017-04-27 RX ADMIN — Medication 500 UNITS: at 13:14

## 2017-04-27 RX ADMIN — GRANISETRON HYDROCHLORIDE 1 MG: 1 INJECTION, SOLUTION INTRAVENOUS at 11:31

## 2017-04-27 RX ADMIN — SODIUM CHLORIDE 1000 ML: 900 INJECTION, SOLUTION INTRAVENOUS at 11:40

## 2017-04-27 RX ADMIN — Medication 10 ML: at 13:14

## 2017-04-27 RX ADMIN — SODIUM CHLORIDE 2128 MG: 900 INJECTION, SOLUTION INTRAVENOUS at 12:43

## 2017-04-27 NOTE — PROGRESS NOTES
[facility]fusion [facility] - Chemotherapy Progress Note     1005 Pt admit to [facility] for Gemzar Cycle 3 D1 ambulatory in stable condition with supportive son. Assessment completed. No new concerns voiced. Port accessed with positive blood return, labs drawn. Pt given one L fluids per D. Wall NP for elevated BUN and creatanine.   Patient Vitals for the past 12 hrs:   Temp Pulse Resp BP SpO2   04/27/17 1314 97.5 °F (36.4 °C) 70 18 138/90 97 %   04/27/17 1007 96.2 °F (35.7 °C) 81 18 150/79 99 %        Medications:  NS  Kytril  Gemzar  1 liter NS bolus     1320 Pt tolerated treatment well. Port maintained positive blood return throughout treatment, flushed with positive blood return at conclusion and heparinized and de-accessed. D/c home ambulatory in no distress. Pt aware of next [facility] appointment scheduled for 5/4/17.     Recent Results (from the past 12 hour(s))   CBC WITH AUTOMATED DIFF    Collection Time: 04/27/17 10:17 AM   Result Value Ref Range    WBC 7.7 3.6 - 11.0 K/uL    RBC 3.29 (L) 3.80 - 5.20 M/uL    HGB 9.5 (L) 11.5 - 16.0 g/dL    HCT 30.3 (L) 35.0 - 47.0 %    MCV 92.1 80.0 - 99.0 FL    MCH 28.9 26.0 - 34.0 PG    MCHC 31.4 30.0 - 36.5 g/dL    RDW 15.3 (H) 11.5 - 14.5 %    PLATELET 331 910 - 980 K/uL    NEUTROPHILS 64 32 - 75 %    LYMPHOCYTES 21 12 - 49 %    MONOCYTES 12 5 - 13 %    EOSINOPHILS 3 0 - 7 %    BASOPHILS 0 0 - 1 %    ABS. NEUTROPHILS 5.0 1.8 - 8.0 K/UL    ABS. LYMPHOCYTES 1.7 0.8 - 3.5 K/UL    ABS. MONOCYTES 0.9 0.0 - 1.0 K/UL    ABS. EOSINOPHILS 0.2 0.0 - 0.4 K/UL    ABS.  BASOPHILS 0.0 0.0 - 0.1 K/UL   METABOLIC PANEL, COMPREHENSIVE    Collection Time: 04/27/17 10:17 AM   Result Value Ref Range    Sodium 137 136 - 145 mmol/L    Potassium 3.9 3.5 - 5.1 mmol/L    Chloride 106 97 - 108 mmol/L    CO2 22 21 - 32 mmol/L    Anion gap 9 5 - 15 mmol/L    Glucose 157 (H) 65 - 100 mg/dL    BUN 29 (H) 6 - 20 MG/DL    Creatinine 1.54 (H) 0.55 - 1.02 MG/DL    BUN/Creatinine ratio 19 12 - 20      GFR est AA 42 (L) >60 ml/min/1.73m2    GFR est non-AA 35 (L) >60 ml/min/1.73m2    Calcium 9.1 8.5 - 10.1 MG/DL    Bilirubin, total 0.5 0.2 - 1.0 MG/DL    ALT (SGPT) 100 (H) 12 - 78 U/L    AST (SGOT) 95 (H) 15 - 37 U/L    Alk.  phosphatase 258 (H) 45 - 117 U/L    Protein, total 9.0 (H) 6.4 - 8.2 g/dL    Albumin 3.2 (L) 3.5 - 5.0 g/dL    Globulin 5.8 (H) 2.0 - 4.0 g/dL    A-G Ratio 0.6 (L) 1.1 - 2.2

## 2017-04-27 NOTE — PROGRESS NOTES
Problem: Patient Education: Go to Education Activity  Goal: Patient/Family Education  Outcome: Progressing Towards Goal  Gemzar education

## 2017-04-28 ENCOUNTER — HOSPITAL ENCOUNTER (EMERGENCY)
Age: 57
Discharge: HOME | End: 2017-04-28
Attending: FAMILY MEDICINE

## 2017-04-28 VITALS
OXYGEN SATURATION: 96 % | HEIGHT: 69 IN | BODY MASS INDEX: 43.4 KG/M2 | WEIGHT: 293 LBS | RESPIRATION RATE: 16 BRPM | DIASTOLIC BLOOD PRESSURE: 59 MMHG | TEMPERATURE: 97.4 F | HEART RATE: 95 BPM | SYSTOLIC BLOOD PRESSURE: 125 MMHG

## 2017-04-28 DIAGNOSIS — J06.9 ACUTE UPPER RESPIRATORY INFECTION: Primary | ICD-10-CM

## 2017-04-28 DIAGNOSIS — Z87.09 H/O INTRINSIC ASTHMA: ICD-10-CM

## 2017-04-28 LAB — CANCER AG125 SERPL-ACNC: 115 U/ML (ref 0–30)

## 2017-04-28 RX ORDER — BENZONATATE 200 MG/1
200 CAPSULE ORAL
Qty: 21 CAP | Refills: 0 | Status: SHIPPED | OUTPATIENT
Start: 2017-04-28 | End: 2017-05-05

## 2017-04-28 RX ORDER — PROMETHAZINE HYDROCHLORIDE AND DEXTROMETHORPHAN HYDROBROMIDE 6.25; 15 MG/5ML; MG/5ML
5 SYRUP ORAL
Qty: 100 ML | Refills: 0 | Status: SHIPPED | OUTPATIENT
Start: 2017-04-28 | End: 2017-07-18

## 2017-04-28 RX ORDER — AZITHROMYCIN 250 MG/1
TABLET, FILM COATED ORAL
Qty: 6 TAB | Refills: 0 | Status: SHIPPED | OUTPATIENT
Start: 2017-04-28 | End: 2017-05-23 | Stop reason: ALTCHOICE

## 2017-04-28 RX ORDER — METHYLPREDNISOLONE 4 MG/1
TABLET ORAL
Qty: 1 DOSE PACK | Refills: 0 | Status: SHIPPED | OUTPATIENT
Start: 2017-04-28 | End: 2017-07-18

## 2017-04-28 NOTE — DISCHARGE INSTRUCTIONS
Saline Nasal Washes: Care Instructions  Your Care Instructions  Saline nasal washes help keep the nasal passages open by washing out thick or dried mucus. This simple remedy can help relieve symptoms of allergies, sinusitis, and colds. It also can make the nose feel more comfortable by keeping the mucous membranes moist. You may notice a little burning sensation in your nose the first few times you use the solution, but this usually gets better in a few days. Follow-up care is a key part of your treatment and safety. Be sure to make and go to all appointments, and call your doctor if you are having problems. It's also a good idea to know your test results and keep a list of the medicines you take. How can you care for yourself at home? · You can buy premixed saline solution in a squeeze bottle or other sinus rinse products at a drugstore. Read and follow the instructions on the label. · You also can make your own saline solution by adding 1 teaspoon of salt and 1 teaspoon of baking soda to 2 cups of distilled water. · If you use a homemade solution, pour a small amount into a clean bowl. Using a rubber bulb syringe, squeeze the syringe and place the tip in the salt water. Pull a small amount of the salt water into the syringe by relaxing your hand. · Sit down with your head tilted slightly back. Do not lie down. Put the tip of the bulb syringe or the squeeze bottle a little way into one of your nostrils. Gently drip or squirt a few drops into the nostril. Repeat with the other nostril. Some sneezing and gagging are normal at first.  · Gently blow your nose. · Wipe the syringe or bottle tip clean after each use. · Repeat this 2 or 3 times a day. · Use nasal washes gently if you have nosebleeds often. When should you call for help? Watch closely for changes in your health, and be sure to contact your doctor if:  · You often get nosebleeds. · You have problems doing the nasal washes.   Where can you learn more? Go to http://shani-jasmin.info/. Enter 071 [id number] [id number] in the search box to learn more about \"Saline Nasal Washes: Care Instructions. \"  Current as of: July 29, 2016  Content Version: 11.2  © 4545-1432 [employer]. Care instructions adapted under license by [employer] (which disclaims liability or warranty for this information). If you have questions about a medical condition or this instruction, always ask your healthcare professional. [address] any warranty or liability for your use of this information. Upper Respiratory Infection (Cold): Care Instructions  Your Care Instructions    An upper respiratory infection, or URI, is an infection of the nose, sinuses, or throat. URIs are spread by coughs, sneezes, and direct contact. The common cold is the most frequent kind of URI. The flu and sinus infections are other kinds of URIs. Almost all URIs are caused by viruses. Antibiotics won't cure them. But you can treat most infections with home care. This may include drinking lots of fluids and taking over-the-counter pain medicine. You will probably feel better in 4 to 10 days. The doctor has checked you carefully, but problems can develop later. If you notice any problems or new symptoms, get medical treatment right away. Follow-up care is a key part of your treatment and safety. Be sure to make and go to all appointments, and call your doctor if you are having problems. It's also a good idea to know your test results and keep a list of the medicines you take. How can you care for yourself at home? · To prevent dehydration, drink plenty of fluids, enough so that your urine is light yellow or clear like water. Choose water and other caffeine-free clear liquids until you feel better. If you have kidney, heart, or liver disease and have to limit fluids, talk with your doctor before you increase the amount of fluids you drink.   · Take an over-the-counter pain medicine, such as acetaminophen (Tylenol), ibuprofen (Advil, Motrin), or naproxen (Aleve). Read and follow all instructions on the label. · Before you use cough and cold medicines, check the label. These medicines may not be safe for young children or for people with certain health problems. · Be careful when taking over-the-counter cold or flu medicines and Tylenol at the same time. Many of these medicines have acetaminophen, which is Tylenol. Read the labels to make sure that you are not taking more than the recommended dose. Too much acetaminophen (Tylenol) can be harmful. · Get plenty of rest.  · Do not smoke or allow others to smoke around you. If you need help quitting, talk to your doctor about stop-smoking programs and medicines. These can increase your chances of quitting for good. When should you call for help? Call 911 anytime you think you may need emergency care. For example, call if:  · You have severe trouble breathing. Call your doctor now or seek immediate medical care if:  · You seem to be getting much sicker. · You have new or worse trouble breathing. · You have a new or higher fever. · You have a new rash. Watch closely for changes in your health, and be sure to contact your doctor if:  · You have a new symptom, such as a sore throat, an earache, or sinus pain. · You cough more deeply or more often, especially if you notice more mucus or a change in the color of your mucus. · You do not get better as expected. Where can you learn more? Go to http://shani-jasmin.info/. Enter V107 in the search box to learn more about \"Upper Respiratory Infection (Cold): Care Instructions. \"  Current as of: June 30, 2016  Content Version: 11.2  © 6757-4863 [employer]. Care instructions adapted under license by [employer] (which disclaims liability or warranty for this information).  If you have questions about a medical condition or this instruction, always ask your healthcare professional. [place] [address] any warranty or liability for your use of this information.

## 2017-04-28 NOTE — UC PROVIDER NOTE
Patient is a 55 y.o. female presenting with cough. The history is provided by the patient. Cough   This is a new problem. The current episode started more than 2 days ago. The problem occurs every few minutes (more at bed time). The problem has been gradually worsening. The cough is productive of sputum. There has been no fever. Associated symptoms include eye redness, ear congestion, rhinorrhea, sore throat and shortness of breath. Pertinent negatives include no chest pain, no chills, no myalgias, no wheezing and no nausea. She has tried inhalers for the symptoms. She is not a smoker. Her past medical history is significant for asthma. Her past medical history does not include bronchitis or pneumonia. Past Medical History:   Diagnosis Date    Adverse effect of anesthesia     sleep apnea cpap machine useage     Anemia     Arthritis     DDD low back and knees    Asthma     uses albuterol prn only; none x 6 mos.   Never hospitalized    BRCA negative 1/2013    Calculus of kidney     Chronic kidney disease     kidney stones    Chronic pain     knee pain bilat    CINV (chemotherapy-induced nausea and vomiting) 8/21/2014    Diabetes ([address] Age 46    IDDM    Environmental allergies     Fibromyalgia     GERD (gastroesophageal reflux disease)     controlled with med    Hypertension     Ill-defined condition     Sepsis 9-2015    Ill-defined condition 2016    chemotherapy     Morbid obesity ([address]     Other and unspecified hyperlipidemia     Ovarian cancer ([address] 9/2012, 1/2014    serous, high grade, stage IIIc. s/p chemotx (has port)    Psychiatric disorder     Rectal bleeding     Thromboembolus ([address] 1993    POST PARTUM; resolved- PELVIS    Thyroid disease     HYPOTHYROID    Unspecified sleep apnea     CPAP, Dr. Amy Rossi        Past Surgical History:   Procedure Laterality Date    BREAST SURGERY PROCEDURE UNLISTED      Lymph node in left breast jan 2017    CHEST SURGERY PROCEDURE UNLISTED Placement of port a cath right chest    HX GYN       X2    HX HYSTERECTOMY  2012    ROULA/BSO, tumor debulking, omentectomy for ovarian cancer    HX ORTHOPAEDIC      ankle-FX, R    HX ORTHOPAEDIC      FX R ARM    HX UROLOGICAL      stent    HX VASCULAR ACCESS  2015    right chest- port placed         Family History   Problem Relation Age of Onset    Hypertension Mother     Arthritis-osteo Mother     Lung Disease Father      COPD    Hypertension Father     Arthritis-osteo Father     Hypertension Brother     Elevated Lipids Brother     Arthritis-osteo Brother     Hypertension Brother     Elevated Lipids Brother     Obesity Brother     Cancer Brother      PROSTATE    Anesth Problems Son      DELAYED AWAKENING    Diabetes Maternal Grandmother         Social History     Social History    Marital status:      Spouse name: N/A    Number of children: N/A    Years of education: N/A     Occupational History    Not on file. Social History Main Topics    Smoking status: Never Smoker    Smokeless tobacco: Never Used    Alcohol use No    Drug use: Yes     Special: Prescription, OTC    Sexual activity: Yes     Other Topics Concern    Not on file     Social History Narrative    Lives in [facility] alone.  passed away in  of a heart attack. Has 2 sons. Disability. 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 Enjoys swimming and going to the gym. ALLERGIES: Carboplatin; Iodinated contrast media - oral and iv dye; Lipitor [atorvastatin]; Percocet [oxycodone-acetaminophen]; Shellfish containing products; and Tape [adhesive]    Review of Systems   Constitutional: Negative for chills. HENT: Positive for congestion, rhinorrhea, sinus pressure and sore throat. Eyes: Positive for redness. Respiratory: Positive for cough and shortness of breath. Negative for wheezing. Cardiovascular: Negative for chest pain.    Gastrointestinal: Negative for nausea. Musculoskeletal: Negative for myalgias. Vitals:    04/28/17 1239   BP: 125/59   Pulse: 95   Resp: 16   Temp: 97.4 °F (36.3 °C)   SpO2: 96%   Weight: 146.5 kg (323 lb)   Height: 5' 9\" (1.753 m)       Physical Exam   Constitutional: No distress. HENT:   Right Ear: Tympanic membrane and ear canal normal.   Left Ear: Tympanic membrane and ear canal normal.   Nose: Nose normal.   Mouth/Throat: No oropharyngeal exudate, posterior oropharyngeal edema or posterior oropharyngeal erythema. Eyes: Conjunctivae are normal. Right eye exhibits no discharge. Left eye exhibits no discharge. Neck: Neck supple. Pulmonary/Chest: Effort normal. No respiratory distress. She has decreased breath sounds. She has no wheezes. She has no rales. Lymphadenopathy:     She has no cervical adenopathy. Skin: No rash noted. Nursing note and vitals reviewed. MDM     Differential Diagnosis; Clinical Impression; Plan:     CLINICAL IMPRESSION:  Acute upper respiratory infection  (primary encounter diagnosis)  H/O intrinsic asthma      DDX    Plan:    Use Mucinex DM twice a day  Fluids/ gargles  Claritin/ allegra   Tylenol cold-sinus - max strength 1-2 tab 4 times/ day    with Advil as needed    If not better in 4-5 days - may use Z oswald,  Use now- tessalon and Promethazine DM suspension  Amount and/or Complexity of Data Reviewed:    Review and summarize past medical records:  Yes  Risk of Significant Complications, Morbidity, and/or Mortality:   Presenting problems: Moderate  Management options:   Moderate  Progress:   Patient progress:  Stable      Procedures

## 2017-05-03 ENCOUNTER — DOCUMENTATION ONLY (OUTPATIENT)
Dept: GYNECOLOGY | Age: 57
End: 2017-05-03

## 2017-05-03 RX ORDER — GRANISETRON HYDROCHLORIDE 1 MG/ML
1 INJECTION, SOLUTION INTRAVENOUS ONCE
Status: COMPLETED | OUTPATIENT
Start: 2017-05-04 | End: 2017-05-04

## 2017-05-03 NOTE — PROGRESS NOTES
Pt seen at [facility] on 4/28/17. Marbella Morgan NP to see pt in [facility] tomorrow before receiving C3D8 Gemzar.

## 2017-05-04 ENCOUNTER — HOSPITAL ENCOUNTER (OUTPATIENT)
Dept: INFUSION THERAPY | Age: 57
Discharge: HOME | End: 2017-05-04
Payer: MEDICARE

## 2017-05-04 VITALS
HEIGHT: 69 IN | RESPIRATION RATE: 18 BRPM | SYSTOLIC BLOOD PRESSURE: 128 MMHG | OXYGEN SATURATION: 96 % | DIASTOLIC BLOOD PRESSURE: 83 MMHG | BODY MASS INDEX: 43.4 KG/M2 | WEIGHT: 293 LBS | HEART RATE: 80 BPM | TEMPERATURE: 97.6 F

## 2017-05-04 LAB
ANION GAP BLD CALC-SCNC: 12 MMOL/L (ref 5–15)
BASO+EOS+MONOS # BLD AUTO: 0.6 K/UL (ref 0.2–1.2)
BASO+EOS+MONOS # BLD AUTO: 8 % (ref 3.2–16.9)
BUN SERPL-MCNC: 23 MG/DL (ref 6–20)
BUN/CREAT SERPL: 20 (ref 12–20)
CALCIUM SERPL-MCNC: 8.7 MG/DL (ref 8.5–10.1)
CHLORIDE SERPL-SCNC: 106 MMOL/L (ref 97–108)
CO2 SERPL-SCNC: 22 MMOL/L (ref 21–32)
CREAT SERPL-MCNC: 1.16 MG/DL (ref 0.55–1.02)
DIFFERENTIAL METHOD BLD: ABNORMAL
ERYTHROCYTE [DISTWIDTH] IN BLOOD BY AUTOMATED COUNT: 13.9 % (ref 11.8–15.8)
GLUCOSE SERPL-MCNC: 140 MG/DL (ref 65–100)
HCT VFR BLD AUTO: 30.3 % (ref 35–47)
HGB BLD-MCNC: 9.9 G/DL (ref 11.5–16)
LYMPHOCYTES # BLD AUTO: 42 % (ref 12–49)
LYMPHOCYTES # BLD: 3.2 K/UL (ref 0.8–3.5)
MCH RBC QN AUTO: 29.1 PG (ref 26–34)
MCHC RBC AUTO-ENTMCNC: 32.7 G/DL (ref 30–36.5)
MCV RBC AUTO: 89.1 FL (ref 80–99)
NEUTS SEG # BLD: 3.9 K/UL (ref 1.8–8)
NEUTS SEG NFR BLD AUTO: 50 % (ref 32–75)
PLATELET # BLD AUTO: 142 K/UL (ref 150–400)
POTASSIUM SERPL-SCNC: 4 MMOL/L (ref 3.5–5.1)
RBC # BLD AUTO: 3.4 M/UL (ref 3.8–5.2)
SODIUM SERPL-SCNC: 140 MMOL/L (ref 136–145)
WBC # BLD AUTO: 7.7 K/UL (ref 3.6–11)

## 2017-05-04 PROCEDURE — 74011250636 HC RX REV CODE- 250/636: Performed by: OBSTETRICS & GYNECOLOGY

## 2017-05-04 PROCEDURE — 96375 TX/PRO/DX INJ NEW DRUG ADDON: CPT

## 2017-05-04 PROCEDURE — 74011250636 HC RX REV CODE- 250/636: Performed by: NURSE PRACTITIONER

## 2017-05-04 PROCEDURE — 85025 COMPLETE CBC W/AUTO DIFF WBC: CPT | Performed by: OBSTETRICS & GYNECOLOGY

## 2017-05-04 PROCEDURE — 74011000250 HC RX REV CODE- 250: Performed by: OBSTETRICS & GYNECOLOGY

## 2017-05-04 PROCEDURE — 80048 BASIC METABOLIC PNL TOTAL CA: CPT | Performed by: NURSE PRACTITIONER

## 2017-05-04 PROCEDURE — 96413 CHEMO IV INFUSION 1 HR: CPT

## 2017-05-04 PROCEDURE — 96361 HYDRATE IV INFUSION ADD-ON: CPT

## 2017-05-04 PROCEDURE — 36415 COLL VENOUS BLD VENIPUNCTURE: CPT | Performed by: OBSTETRICS & GYNECOLOGY

## 2017-05-04 PROCEDURE — 77030012965 HC NDL HUBR BBMI -A

## 2017-05-04 RX ORDER — SODIUM CHLORIDE 9 MG/ML
50 INJECTION, SOLUTION INTRAVENOUS AS NEEDED
Status: DISPENSED | OUTPATIENT
Start: 2017-05-04 | End: 2017-05-05

## 2017-05-04 RX ORDER — SODIUM CHLORIDE 9 MG/ML
10 INJECTION INTRAMUSCULAR; INTRAVENOUS; SUBCUTANEOUS AS NEEDED
Status: ACTIVE | OUTPATIENT
Start: 2017-05-04 | End: 2017-05-05

## 2017-05-04 RX ORDER — HEPARIN 100 UNIT/ML
500 SYRINGE INTRAVENOUS AS NEEDED
Status: ACTIVE | OUTPATIENT
Start: 2017-05-04 | End: 2017-05-05

## 2017-05-04 RX ORDER — SODIUM CHLORIDE 0.9 % (FLUSH) 0.9 %
10-40 SYRINGE (ML) INJECTION AS NEEDED
Status: ACTIVE | OUTPATIENT
Start: 2017-05-04 | End: 2017-05-05

## 2017-05-04 RX ADMIN — SODIUM CHLORIDE 2128 MG: 900 INJECTION, SOLUTION INTRAVENOUS at 11:08

## 2017-05-04 RX ADMIN — SODIUM CHLORIDE 10 ML: 9 INJECTION INTRAMUSCULAR; INTRAVENOUS; SUBCUTANEOUS at 09:42

## 2017-05-04 RX ADMIN — SODIUM CHLORIDE 1000 ML: 900 INJECTION, SOLUTION INTRAVENOUS at 11:45

## 2017-05-04 RX ADMIN — SODIUM CHLORIDE, PRESERVATIVE FREE 500 UNITS: 5 INJECTION INTRAVENOUS at 12:57

## 2017-05-04 RX ADMIN — Medication 10 ML: at 09:42

## 2017-05-04 RX ADMIN — SODIUM CHLORIDE 50 ML/HR: 900 INJECTION, SOLUTION INTRAVENOUS at 09:40

## 2017-05-04 RX ADMIN — GRANISETRON HYDROCHLORIDE 1 MG: 1 INJECTION, SOLUTION INTRAVENOUS at 09:41

## 2017-05-04 RX ADMIN — Medication 10 ML: at 12:57

## 2017-05-04 NOTE — PROGRESS NOTES
Outpatient Infusion Center - Chemotherapy Progress Note    0915 Pt admit to [place] for Gemzar ambulatory in stable condition. Assessment completed. No new concerns voiced. Port accessed with positive blood return. Since her creatinine was elevated last week during treatment, Brijesh Mchugh NP gave orders for BMP to be draw today. CBC and BMP drawn and sent for processing. IV attached to NS at [address] NP at the bedside with patient. 1 liter of hydration ordered and given    Chemotherapy Flowsheet 5/4/2017   Cycle C3 D8   Date 5/4/2017   Drug / Regimen Gemzar   Pre Hydration -   Pre Meds -   Notes -       Visit Vitals    /83    Pulse 80    Temp 97.6 °F (36.4 °C)    Resp 18    Ht 5' 8.5\" (1.74 m)    Wt 143.6 kg (316 lb 8 oz)    LMP 09/07/2011    SpO2 96%    Breastfeeding No    BMI 47.42 kg/m2       Medications:  Kytril  Gemzar  1 liter NS bolus    1300 Pt tolerated treatment well. Port maintained positive blood return throughout treatment, flushed with positive blood return at conclusion. D/c home ambulatory in no distress. Pt aware of next appointment scheduled for 5/25/17 at 10am    Recent Results (from the past 12 hour(s))   CBC WITH 3 PART DIFF    Collection Time: 05/04/17  9:18 AM   Result Value Ref Range    WBC 7.7 3.6 - 11.0 K/uL    RBC 3.40 (L) 3.80 - 5.20 M/uL    HGB 9.9 (L) 11.5 - 16.0 g/dL    HCT 30.3 (L) 35.0 - 47.0 %    MCV 89.1 80.0 - 99.0 FL    MCH 29.1 26.0 - 34.0 PG    MCHC 32.7 30.0 - 36.5 g/dL    RDW 13.9 11.8 - 15.8 %    PLATELET 106 (L) 431 - 400 K/uL    NEUTROPHILS 50 32 - 75 %    MIXED CELLS 8 3.2 - 16.9 %    LYMPHOCYTES 42 12 - 49 %    ABS. NEUTROPHILS 3.9 1.8 - 8.0 K/UL    ABS. MIXED CELLS 0.6 0.2 - 1.2 K/uL    ABS.  LYMPHOCYTES 3.2 0.8 - 3.5 K/UL    DF AUTOMATED     METABOLIC PANEL, BASIC    Collection Time: 05/04/17  9:18 AM   Result Value Ref Range    Sodium 140 136 - 145 mmol/L    Potassium 4.0 3.5 - 5.1 mmol/L    Chloride 106 97 - 108 mmol/L    CO2 22 21 - 32 mmol/L Anion gap 12 5 - 15 mmol/L    Glucose 140 (H) 65 - 100 mg/dL    BUN 23 (H) 6 - 20 MG/DL    Creatinine 1.16 (H) 0.55 - 1.02 MG/DL    BUN/Creatinine ratio 20 12 - 20      GFR est AA 59 (L) >60 ml/min/1.73m2    GFR est non-AA 49 (L) >60 ml/min/1.73m2    Calcium 8.7 8.5 - 10.1 MG/DL

## 2017-05-05 DIAGNOSIS — J06.9 UPPER RESPIRATORY TRACT INFECTION, UNSPECIFIED TYPE: Primary | ICD-10-CM

## 2017-05-07 RX ORDER — MOMETASONE FUROATE 50 UG/1
2 SPRAY, METERED NASAL AS NEEDED
Qty: 1 CONTAINER | Refills: 4 | Status: SHIPPED | OUTPATIENT
Start: 2017-05-07 | End: 2017-05-11

## 2017-05-11 RX ORDER — FLUTICASONE PROPIONATE 50 MCG
2 SPRAY, SUSPENSION (ML) NASAL DAILY
Qty: 2 BOTTLE | Refills: 3 | Status: SHIPPED | OUTPATIENT
Start: 2017-05-11 | End: 2017-12-30 | Stop reason: SDUPTHER

## 2017-05-23 ENCOUNTER — OFFICE VISIT (OUTPATIENT)
Dept: GYNECOLOGY | Age: 57
End: 2017-05-23

## 2017-05-23 VITALS
BODY MASS INDEX: 43.4 KG/M2 | DIASTOLIC BLOOD PRESSURE: 61 MMHG | HEART RATE: 92 BPM | WEIGHT: 293 LBS | SYSTOLIC BLOOD PRESSURE: 101 MMHG | HEIGHT: 69 IN

## 2017-05-23 DIAGNOSIS — E66.01 MORBID OBESITY DUE TO EXCESS CALORIES (HCC): ICD-10-CM

## 2017-05-23 DIAGNOSIS — R97.1 ELEVATED CA-125: ICD-10-CM

## 2017-05-23 DIAGNOSIS — C56.9 MALIGNANT NEOPLASM OF OVARY, UNSPECIFIED LATERALITY (HCC): ICD-10-CM

## 2017-05-23 DIAGNOSIS — C80.0 CARCINOMATOSIS (HCC): Primary | ICD-10-CM

## 2017-05-23 DIAGNOSIS — C56.9 OVARIAN CANCER, UNSPECIFIED LATERALITY (HCC): Primary | ICD-10-CM

## 2017-05-23 NOTE — PROGRESS NOTES
[address] [place] [address] [address], [address]  [phone]  Larissa Setting, MD Desiderio Eisenmenger, MD      Patient ID:  Name: Teddy Valera  MRN: [id number]  : 1960/53 y.o. Visit date: 2017     Acute Diagnosis:   Vaginal spotting   Persistent EOC    HISTORY:  49 y.o.  female with a diagnosis of ovarian cancer. Her original surgery: 12 Ex-lap, ROULA/BSO, radical tumor debulking. Pathology:  High grade serious ovarian cancer with omental cake and carcinomatosis. Stage IIIC. She received Taxol/carboplatin (10/2012 - 2013). She recurred in mid  noted CT with Peritoneal carcinomatosis with slightly increased size of the peritoneal implants throughout the abdomen, Pericardial lymph nodes unchanged and Bilateral pelvic adenopathy increased in size. Initiated retreatment with Taxol/carboplatin (2014-2014) with HSR. She was then monitored until progression, initiated on Doxil/Avastin (3/2014), did poorly, though MRI following cycle 3 showed extensive progression. She has receive Topotecan palliation. Component      Latest Ref Rng & Units 2017 3/9/2017 2017 2017          10:17 AM  3:52 PM  3:00 PM  1:48 PM   CA-125      0 - 30 U/mL 115 (H) 181 (H)     Cancer Ag (CA) 125      0.0 - 38.1 U/mL   259.0 (H) 347.1 (H)     Component      Latest Ref Rng & Units 2016           1:37 PM  1:54 PM   CA-125      0 - 30 U/mL  265 (H)   Cancer Ag (CA) 125      0.0 - 38.1 U/mL 353.3 (H)        Recent initiation of IV Gemzar/Avastin---#3 2017:  The patient is seen for prechemotherapy evaluation. Tolerating IV Gemzar (d.1&8) well    EOD CT: 2017  FINDINGS:   Solid organ evaluation is limited without contrast.      Multiple scattered intraperitoneal soft tissue nodules are overall increased.  A  representative soft tissue mass in the pelvis adjacent to the rectum measures  3.6 x 3.8 cm (series 3, image 77), previously 1.6 x 1.9 cm. A representative  soft tissue nodule in the left hemipelvis measures 3.2 x 3.3 cm (series 3, image  70), previously 2.5 x 2.9 cm. An adjacent soft tissue nodule laterally measures  3.4 x 4.7 cm (series 3, image 72), previously 2.7 x 4.2 cm. A representative  conglomerate of soft tissue nodules adjacent to the pancreatic tail measures 3.6  x 4.8 cm (series 3, image 21), previously 3.2 x 3.4 cm.     A right inguinal lymph node measures 2.2 x 3.1 cm (series 3, image 83),  previously 1.8 x 2.6 cm.      The visualized lung bases demonstrate no mass or consolidation. The heart size  is normal. There is no pericardial or pleural effusion.     A 1.2 cm calculus is again seen in the left renal pelvis with new mild left  hydronephrosis and mild fat stranding adjacent to the left renal hilum. Several  small nonobstructing calculi are also seen bilaterally. The urinary bladder is  grossly normal.     The liver, spleen, pancreas, and adrenal glands are normal. The gall bladder is  present without intra- or extra-hepatic biliary dilatation.      There are no dilated bowel loops. There is no free fluid or free air. The aorta  tapers without aneurysm.     The uterus and ovaries are surgically absent. There is no pelvic mass. There are  stable degenerative changes in the spine. There is no aggressive blastic or  lytic bony lesion.     IMPRESSION  IMPRESSION:   1. Overall progression of metastatic disease with multiple intraperitoneal soft  tissue implants. There is no ascites. There is also increased right inguinal  lymphadenopathy.     2. At 1.2 cm calculus is again seen in the left renal collecting system. There  is new mild left hydronephrosis and inflammation adjacent to the left renal  hilum. Correlation with urinalysis for infection is suggested.         Denies dysuria, hematuria. Urinary incontinence continues, vaginal discharge, N/V, F/C.    Ongoing dental evaluation for ??infection. Placed on antibiotics  Negative  and GI review for dysfunction  Negative cardiopulmonary review  No weight loss    PMH:  Past Medical History:   Diagnosis Date    Adverse effect of anesthesia     sleep apnea cpap machine useage     Anemia     Arthritis     DDD low back and knees    Asthma     uses albuterol prn only; none x 6 mos. Never hospitalized    BRCA negative 2013    Calculus of kidney     Chronic kidney disease     kidney stones    Chronic pain     knee pain bilat    CINV (chemotherapy-induced nausea and vomiting) 2014    Diabetes ([facility] Age 46    IDDM    Environmental allergies     Fibromyalgia     GERD (gastroesophageal reflux disease)     controlled with med    Hypertension     Ill-defined condition     Sepsis     Ill-defined condition 2016    chemotherapy     Morbid obesity ([facility]     Other and unspecified hyperlipidemia     Ovarian cancer ([facility] 2012, 2014    serous, high grade, stage IIIc. s/p chemotx (has port)    Psychiatric disorder     Rectal bleeding     Thromboembolus ([facility]     POST PARTUM; resolved- PELVIS    Thyroid disease     HYPOTHYROID    Unspecified sleep apnea     CPAP, Dr. Jamar Marlow     PSH:  Past Surgical History:   Procedure Laterality Date    BREAST SURGERY PROCEDURE UNLISTED      Lymph node in left breast 2017    CHEST SURGERY PROCEDURE UNLISTED      Placement of port a cath right chest    HX GYN       X2    HX HYSTERECTOMY  2012    ROULA/BSO, tumor debulking, omentectomy for ovarian cancer    HX ORTHOPAEDIC      ankle-FX, R    HX ORTHOPAEDIC      FX R ARM    HX UROLOGICAL      stent    HX VASCULAR ACCESS  2015    right chest- port placed     Medications:  Current Outpatient Prescriptions on File Prior to Visit   Medication Sig Dispense Refill    fluticasone (FLONASE) 50 mcg/actuation nasal spray 2 Sprays by Both Nostrils route daily for 30 days.  2 Bottle 3    methylPREDNISolone (MEDROL, ERNESTO,) 4 mg tablet As directed 1 Dose Pack 0    omega-3 acid ethyl esters (LOVAZA) 1 gram capsule TAKE ONE CAPSULE BY MOUTH TWICE A DAY  3    Liraglutide (VICTOZA) 0.6 mg/0.1 mL (18 mg/3 mL) sub-q pen 0.3 mL by SubCUTAneous route daily (with lunch). 3 Each 11    cycloSPORINE (RESTASIS) 0.05 % ophthalmic emulsion 1 Drop two (2) times a day.  OTHER Vitamin called easy promise takes two after dinner      multivitamin (ONE A DAY) tablet Take 2 Tabs by mouth daily (after dinner).  HYDROcodone-acetaminophen (NORCO) 5-325 mg per tablet Take 1 Tab by mouth every four (4) hours as needed for Pain. Max Daily Amount: 6 Tabs. 20 Tab 0    sertraline (ZOLOFT) 50 mg tablet TAKE 1 TABLET BY MOUTH EVERY MORNING  1    clonazePAM (KLONOPIN) 1 mg tablet TAKE 1 TABLET BY MOUTH TWICE A DAY  1    methocarbamol (ROBAXIN) 500 mg tablet Take  by mouth three (3) times daily.  valsartan (DIOVAN) 160 mg tablet TAKE 1 TAB BY MOUTH DAILY. REPLACES VALSARTAN-HCTZ 30 Tab 11    LEVEMIR FLEXTOUCH 100 unit/mL (3 mL) inpn 30 UNITS IN THE MORNING AND 50 UNITS AT NIGHT. INCREASE AS DIRECTED TO MAX  UNITS PER DAY 30 mL 11    levothyroxine (SYNTHROID) 150 mcg tablet TAKE 1 TAB BY MOUTH DAILY (BEFORE BREAKFAST). 90 Tab 3    Lancets misc Use as directed to test blood sugar three times daily-DX-DM-250.00 270 Each 3    BD INSULIN PEN NEEDLE UF SHORT 31 gauge x 5/16\" ndle USE WITH INSULIN TO INJECT FIVE TIMES DAILY. 200 Pen Needle 11    NOVOLOG FLEXPEN 100 unit/mL inpn INJECT 20 UNITS BEFORE EACH MEAL + 4 UNITS FOR EVERY 50 MG/DL ABOVE 150 MG/DL.  UNITS PER DAY 30 mL 11    potassium chloride SR (KLOR-CON 10) 10 mEq tablet Take 1 Tab by mouth two (2) days a week. 30 Tab 1    cholecalciferol, VITAMIN D3, (VITAMIN D3) 5,000 unit tab tablet Take  by mouth daily.  furosemide (LASIX) 40 mg tablet Take 80 mg by mouth as needed.   3    Blood-Glucose Meter monitoring kit Use as directed to test blood sugar three times daily-DX-DM-250.00 1 Kit 0    albuterol (PROVENTIL HFA, VENTOLIN HFA, PROAIR HFA) 90 mcg/actuation inhaler Take 2 Puffs by inhalation every four (4) hours as needed for Wheezing.  promethazine-dextromethorphan (PROMETHAZINE-DM) 6.25-15 mg/5 mL syrup Take 5 mL by mouth nightly as needed for Cough. 100 mL 0    SYMBICORT 160-4.5 mcg/actuation HFA inhaler       nystatin (NYSTOP) powder Apply to affected area 4 x daily. 30 g 3    ondansetron (ZOFRAN ODT) 8 mg disintegrating tablet Take 1 Tab by mouth every eight (8) hours as needed for Nausea. 30 Tab 1    montelukast (SINGULAIR) 10 mg tablet Take 1 Tab by mouth daily. 90 Tab 2    fluticasone-salmeterol (ADVAIR HFA) 230-21 mcg/actuation inhaler Take 2 Puffs by inhalation two (2) times a day. 12 g 5    albuterol (PROVENTIL VENTOLIN) 2.5 mg /3 mL (0.083 %) nebulizer solution 3 mL by Nebulization route every four (4) hours as needed for Wheezing. 24 Each 11    fexofenadine (ALLEGRA) 180 mg tablet Take 180 mg by mouth daily. No current facility-administered medications on file prior to visit. Allergies   Allergen Reactions    Carboplatin Other (comments)     Patient developed shortness of breath, felt faint, coughing, skin flushed and \"itchy\". This occurred on the patients 8th treatment.  Iodinated Contrast Media - Oral And Iv Dye Rash    Lipitor [Atorvastatin] Myalgia    Percocet [Oxycodone-Acetaminophen] Other (comments)     fever    Shellfish Containing Products Hives    Tape [Adhesive] Itching and Other (comments)     \"op site-- clear,thin tape\"--caused blister      ROS:  Negative    OBJECTIVE:  Vitals: Mell Anna   Vitals:    05/23/17 1407   BP: 101/61   Pulse: 92   Weight: 316 lb 6.4 oz (143.5 kg)   Height: 5' 8.5\" (1.74 m)        Office Examination:    General appearance - alert, well appearing, and in no distress  Eyes - clear  Lymphatics - no palpable lymphadenopathy  Heart - normal rate and regular rhythm, S1 and S2 normal  Pulmonary - Clear to ausculation and percussion  Abdomen - rotund, soft, nontender, nondistended, no masses or organomegaly/fluid wave  Rectal - rectal exam not indicated  Back exam - no CVAT  Extrem: Mild upper extremity edema, L>R, good pulses. Nontender      DATE REVIEW as available:  Lab Results   Component Value Date/Time    WBC 7.7 05/04/2017 09:18 AM    Hemoglobin (POC) 10.5 03/20/2017 12:15 PM    HGB 9.9 05/04/2017 09:18 AM    Hematocrit (POC) 31 03/20/2017 12:15 PM    HCT 30.3 05/04/2017 09:18 AM    PLATELET 682 86/94/6826 09:18 AM    MCV 89.1 05/04/2017 09:18 AM     Lab Results   Component Value Date/Time    Sodium 140 05/04/2017 09:18 AM    Potassium 4.0 05/04/2017 09:18 AM    Chloride 106 05/04/2017 09:18 AM    CO2 22 05/04/2017 09:18 AM    Anion gap 12 05/04/2017 09:18 AM    Glucose 140 05/04/2017 09:18 AM    BUN 23 05/04/2017 09:18 AM    Creatinine 1.16 05/04/2017 09:18 AM    BUN/Creatinine ratio 20 05/04/2017 09:18 AM    GFR est AA 59 05/04/2017 09:18 AM    GFR est non-AA 49 05/04/2017 09:18 AM    Calcium 8.7 05/04/2017 09:18 AM    Bilirubin, total 0.5 04/27/2017 10:17 AM    AST (SGOT) 95 04/27/2017 10:17 AM    Alk. phosphatase 258 04/27/2017 10:17 AM    Protein, total 9.0 04/27/2017 10:17 AM    Albumin 3.2 04/27/2017 10:17 AM    Globulin 5.8 04/27/2017 10:17 AM    A-G Ratio 0.6 04/27/2017 10:17 AM    ALT (SGPT) 100 04/27/2017 10:17 AM         IMPRESSION AND PLAN:    Bc Infante has a working diagnosis of EOC. EOD CT and  elevations would be consistent with recurrence. She is S/P six cycles of Carbo/Taxol retreatment due to prolonged DFI. Elevated . CT suggestive of progressive disease. S/P Doxil/Avastin #3. S/P Topotecan #3, complicated by anemia and thrombocytopenia  S/P IV Gemzar/Avastin #3,  4/27/2017. Screening labs.   Continue IV Gemzar/Avastin    Leighann Watkins MD  5/23/2017

## 2017-05-24 RX ORDER — INSULIN ASPART 100 [IU]/ML
INJECTION, SOLUTION INTRAVENOUS; SUBCUTANEOUS
Qty: 30 ML | Refills: 11 | Status: SHIPPED | OUTPATIENT
Start: 2017-05-24 | End: 2018-02-14 | Stop reason: SDUPTHER

## 2017-05-24 RX ORDER — GRANISETRON HYDROCHLORIDE 1 MG/ML
1 INJECTION, SOLUTION INTRAVENOUS ONCE
Status: DISCONTINUED | OUTPATIENT
Start: 2017-05-30 | End: 2017-05-25

## 2017-05-25 ENCOUNTER — HOSPITAL ENCOUNTER (OUTPATIENT)
Dept: INFUSION THERAPY | Age: 57
Discharge: HOME | End: 2017-05-25
Payer: MEDICARE

## 2017-05-25 VITALS
WEIGHT: 293 LBS | HEIGHT: 68 IN | DIASTOLIC BLOOD PRESSURE: 71 MMHG | TEMPERATURE: 98.3 F | BODY MASS INDEX: 44.41 KG/M2 | SYSTOLIC BLOOD PRESSURE: 115 MMHG | HEART RATE: 70 BPM | RESPIRATION RATE: 18 BRPM

## 2017-05-25 LAB
ALBUMIN SERPL BCP-MCNC: 3.4 G/DL (ref 3.5–5)
ALBUMIN/GLOB SERPL: 0.8 {RATIO} (ref 1.1–2.2)
ALP SERPL-CCNC: 153 U/L (ref 45–117)
ALT SERPL-CCNC: 52 U/L (ref 12–78)
ANION GAP BLD CALC-SCNC: 9 MMOL/L (ref 5–15)
AST SERPL W P-5'-P-CCNC: 62 U/L (ref 15–37)
BASOPHILS # BLD AUTO: 0 K/UL (ref 0–0.1)
BASOPHILS # BLD: 0 % (ref 0–1)
BILIRUB SERPL-MCNC: 0.3 MG/DL (ref 0.2–1)
BUN SERPL-MCNC: 33 MG/DL (ref 6–20)
BUN/CREAT SERPL: 20 (ref 12–20)
CALCIUM SERPL-MCNC: 8.8 MG/DL (ref 8.5–10.1)
CHLORIDE SERPL-SCNC: 109 MMOL/L (ref 97–108)
CO2 SERPL-SCNC: 22 MMOL/L (ref 21–32)
CREAT SERPL-MCNC: 1.67 MG/DL (ref 0.55–1.02)
EOSINOPHIL # BLD: 0.2 K/UL (ref 0–0.4)
EOSINOPHIL NFR BLD: 2 % (ref 0–7)
ERYTHROCYTE [DISTWIDTH] IN BLOOD BY AUTOMATED COUNT: 15.3 % (ref 11.5–14.5)
GLOBULIN SER CALC-MCNC: 4.5 G/DL (ref 2–4)
GLUCOSE SERPL-MCNC: 132 MG/DL (ref 65–100)
HCT VFR BLD AUTO: 28.8 % (ref 35–47)
HGB BLD-MCNC: 9 G/DL (ref 11.5–16)
LYMPHOCYTES # BLD AUTO: 32 % (ref 12–49)
LYMPHOCYTES # BLD: 2.4 K/UL (ref 0.8–3.5)
MCH RBC QN AUTO: 28.8 PG (ref 26–34)
MCHC RBC AUTO-ENTMCNC: 31.3 G/DL (ref 30–36.5)
MCV RBC AUTO: 92 FL (ref 80–99)
MONOCYTES # BLD: 1.2 K/UL (ref 0–1)
MONOCYTES NFR BLD AUTO: 17 % (ref 5–13)
NEUTS SEG # BLD: 3.7 K/UL (ref 1.8–8)
NEUTS SEG NFR BLD AUTO: 49 % (ref 32–75)
PLATELET # BLD AUTO: 264 K/UL (ref 150–400)
POTASSIUM SERPL-SCNC: 4 MMOL/L (ref 3.5–5.1)
PROT SERPL-MCNC: 7.9 G/DL (ref 6.4–8.2)
RBC # BLD AUTO: 3.13 M/UL (ref 3.8–5.2)
SODIUM SERPL-SCNC: 140 MMOL/L (ref 136–145)
WBC # BLD AUTO: 7.5 K/UL (ref 3.6–11)

## 2017-05-25 PROCEDURE — 74011250636 HC RX REV CODE- 250/636: Performed by: OBSTETRICS & GYNECOLOGY

## 2017-05-25 PROCEDURE — 85025 COMPLETE CBC W/AUTO DIFF WBC: CPT | Performed by: OBSTETRICS & GYNECOLOGY

## 2017-05-25 PROCEDURE — 77030012965 HC NDL HUBR BBMI -A

## 2017-05-25 PROCEDURE — 80053 COMPREHEN METABOLIC PANEL: CPT | Performed by: OBSTETRICS & GYNECOLOGY

## 2017-05-25 PROCEDURE — 96413 CHEMO IV INFUSION 1 HR: CPT

## 2017-05-25 PROCEDURE — 36415 COLL VENOUS BLD VENIPUNCTURE: CPT | Performed by: OBSTETRICS & GYNECOLOGY

## 2017-05-25 PROCEDURE — 96375 TX/PRO/DX INJ NEW DRUG ADDON: CPT

## 2017-05-25 PROCEDURE — 74011000250 HC RX REV CODE- 250: Performed by: OBSTETRICS & GYNECOLOGY

## 2017-05-25 PROCEDURE — 86304 IMMUNOASSAY TUMOR CA 125: CPT | Performed by: OBSTETRICS & GYNECOLOGY

## 2017-05-25 RX ORDER — SODIUM CHLORIDE 9 MG/ML
10 INJECTION INTRAMUSCULAR; INTRAVENOUS; SUBCUTANEOUS AS NEEDED
Status: ACTIVE | OUTPATIENT
Start: 2017-05-25 | End: 2017-05-26

## 2017-05-25 RX ORDER — HEPARIN 100 UNIT/ML
500 SYRINGE INTRAVENOUS AS NEEDED
Status: ACTIVE | OUTPATIENT
Start: 2017-05-25 | End: 2017-05-26

## 2017-05-25 RX ORDER — SODIUM CHLORIDE 0.9 % (FLUSH) 0.9 %
10-40 SYRINGE (ML) INJECTION AS NEEDED
Status: ACTIVE | OUTPATIENT
Start: 2017-05-25 | End: 2017-05-26

## 2017-05-25 RX ORDER — GRANISETRON HYDROCHLORIDE 1 MG/ML
1 INJECTION, SOLUTION INTRAVENOUS ONCE
Status: COMPLETED | OUTPATIENT
Start: 2017-05-25 | End: 2017-05-25

## 2017-05-25 RX ADMIN — Medication 10 ML: at 10:28

## 2017-05-25 RX ADMIN — SODIUM CHLORIDE 2128 MG: 900 INJECTION, SOLUTION INTRAVENOUS at 11:59

## 2017-05-25 RX ADMIN — SODIUM CHLORIDE 10 ML: 9 INJECTION INTRAMUSCULAR; INTRAVENOUS; SUBCUTANEOUS at 10:28

## 2017-05-25 RX ADMIN — GRANISETRON HYDROCHLORIDE 1 MG: 1 INJECTION, SOLUTION INTRAVENOUS at 11:38

## 2017-05-25 NOTE — PROGRESS NOTES
Outpatient Infusion Center - Chemotherapy Progress Note    7159 Pt admit to [facility] for Gemzar/C4D1 ambulatory in stable condition. Assessment completed. Pt reports that after her Gemzar infusions she notices small blisters that end up popping and leaving dark marks; also notices small red pimples in her scalp that eventually goes away. No new concerns voiced. Port accessed with positive blood return. Labs drawn per order and sent. Line flushed, NS infusing. Visit Vitals    /68    Pulse 68    Temp 98.3 °F (36.8 °C)    Resp 16    Ht 5' 8\" (1.727 m)    Wt 143.3 kg (316 lb)    LMP 09/07/2011    BMI 48.05 kg/m2       Medications:  NS  Kytril 1 mg  Gemzar      1345 Pt tolerated treatment well. Port maintained positive blood return throughout treatment, flushed with positive blood return at conclusion, heparinized and de-accessed. D/c home ambulatory in no distress. Pt aware of next [facility] appointment scheduled for 06/01/2017. Recent Results (from the past 12 hour(s))   CBC WITH AUTOMATED DIFF    Collection Time: 05/25/17 10:26 AM   Result Value Ref Range    WBC 7.5 3.6 - 11.0 K/uL    RBC 3.13 (L) 3.80 - 5.20 M/uL    HGB 9.0 (L) 11.5 - 16.0 g/dL    HCT 28.8 (L) 35.0 - 47.0 %    MCV 92.0 80.0 - 99.0 FL    MCH 28.8 26.0 - 34.0 PG    MCHC 31.3 30.0 - 36.5 g/dL    RDW 15.3 (H) 11.5 - 14.5 %    PLATELET 805 094 - 665 K/uL    NEUTROPHILS 49 32 - 75 %    LYMPHOCYTES 32 12 - 49 %    MONOCYTES 17 (H) 5 - 13 %    EOSINOPHILS 2 0 - 7 %    BASOPHILS 0 0 - 1 %    ABS. NEUTROPHILS 3.7 1.8 - 8.0 K/UL    ABS. LYMPHOCYTES 2.4 0.8 - 3.5 K/UL    ABS. MONOCYTES 1.2 (H) 0.0 - 1.0 K/UL    ABS. EOSINOPHILS 0.2 0.0 - 0.4 K/UL    ABS.  BASOPHILS 0.0 0.0 - 0.1 K/UL   METABOLIC PANEL, COMPREHENSIVE    Collection Time: 05/25/17 10:26 AM   Result Value Ref Range    Sodium 140 136 - 145 mmol/L    Potassium 4.0 3.5 - 5.1 mmol/L    Chloride 109 (H) 97 - 108 mmol/L    CO2 22 21 - 32 mmol/L    Anion gap 9 5 - 15 mmol/L    Glucose 132 (H) 65 - 100 mg/dL    BUN 33 (H) 6 - 20 MG/DL    Creatinine 1.67 (H) 0.55 - 1.02 MG/DL    BUN/Creatinine ratio 20 12 - 20      GFR est AA 39 (L) >60 ml/min/1.73m2    GFR est non-AA 32 (L) >60 ml/min/1.73m2    Calcium 8.8 8.5 - 10.1 MG/DL    Bilirubin, total 0.3 0.2 - 1.0 MG/DL    ALT (SGPT) 52 12 - 78 U/L    AST (SGOT) 62 (H) 15 - 37 U/L    Alk.  phosphatase 153 (H) 45 - 117 U/L    Protein, total 7.9 6.4 - 8.2 g/dL    Albumin 3.4 (L) 3.5 - 5.0 g/dL    Globulin 4.5 (H) 2.0 - 4.0 g/dL    A-G Ratio 0.8 (L) 1.1 - 2.2

## 2017-05-25 NOTE — PROGRESS NOTES
Shahla Triplett. JAD Hernandez      Date of visit: 5/25/2017       Subjective:   Ms. Salvatore Castellon is a long time pt of Dr. Swetha Andrews. She was initially dx with ovarian cancer in Sept 2012. She had MARAH/BSO/Cytoreductive surgery on 9/21/12. At that time she was Stage III C. She recovered and began 6 cycles of Carbo/Taxol. Had good response, but reoccurred and in Fall of 2014 she received 3 cycles of Carbo/Taxol before having a reaction to Carbo and this was stopped. She continued her next 3 cycles with Doxil/Avastin. This did not decrease disease. .   She had a treatment break from 3/2015 until she restarted with Topatecan for 11 cycles from July 2015 through March 2016. She was followed by Dr. Swetha Andrews and in Jan 2017, she had a CT scan that showed recurrence of disease (see below)      She began treatment with gemzar q21 days on Feb 2nd and presents today for cycle 4 D1  Today, her hgb is noted at 9.0. Noted her creat up to 1.67 today with BUN 33. Pt says she has been diligent with drinking water throughout the day and has not noticed any darkening of her urine or decrease in her urination. Denies abd or flank pain. She continues drinking \"green smoothies\" twice a day between meals that she says keeps her \"regular\". Diagnosis: Ovarian cancer  Original Surgery: 9/21/12 Ex-lap, ROULA/BSO, radical tumor debulking  Pathology: High grade serious ovarian cancer with omental cake and carcinomatosis. Stage IIIC  Preop CA-125: 147 units      Current Outpatient Prescriptions   Medication Sig    insulin aspart (NOVOLOG FLEXPEN) 100 unit/mL inpn INJECT 20 UNITS BEFORE EACH MEAL + 4 UNITS FOR EVERY 50 MG/DL ABOVE 150 MG/DL.  UNITS PER DAY    fluticasone (FLONASE) 50 mcg/actuation nasal spray 2 Sprays by Both Nostrils route daily for 30 days.     promethazine-dextromethorphan (PROMETHAZINE-DM) 6.25-15 mg/5 mL syrup Take 5 mL by mouth nightly as needed for Cough.  methylPREDNISolone (MEDROL, ERNESTO,) 4 mg tablet As directed    SYMBICORT 160-4.5 mcg/actuation HFA inhaler     omega-3 acid ethyl esters (LOVAZA) 1 gram capsule TAKE ONE CAPSULE BY MOUTH TWICE A DAY    Liraglutide (VICTOZA) 0.6 mg/0.1 mL (18 mg/3 mL) sub-q pen 0.3 mL by SubCUTAneous route daily (with lunch).  nystatin (NYSTOP) powder Apply to affected area 4 x daily.  cycloSPORINE (RESTASIS) 0.05 % ophthalmic emulsion 1 Drop two (2) times a day.  OTHER Vitamin called easy promise takes two after dinner    multivitamin (ONE A DAY) tablet Take 2 Tabs by mouth daily (after dinner).  HYDROcodone-acetaminophen (NORCO) 5-325 mg per tablet Take 1 Tab by mouth every four (4) hours as needed for Pain. Max Daily Amount: 6 Tabs.  sertraline (ZOLOFT) 50 mg tablet TAKE 1 TABLET BY MOUTH EVERY MORNING    clonazePAM (KLONOPIN) 1 mg tablet TAKE 1 TABLET BY MOUTH TWICE A DAY    methocarbamol (ROBAXIN) 500 mg tablet Take  by mouth three (3) times daily.  ondansetron (ZOFRAN ODT) 8 mg disintegrating tablet Take 1 Tab by mouth every eight (8) hours as needed for Nausea.  valsartan (DIOVAN) 160 mg tablet TAKE 1 TAB BY MOUTH DAILY. REPLACES VALSARTAN-HCTZ    LEVEMIR FLEXTOUCH 100 unit/mL (3 mL) inpn 30 UNITS IN THE MORNING AND 50 UNITS AT NIGHT. INCREASE AS DIRECTED TO MAX  UNITS PER DAY    montelukast (SINGULAIR) 10 mg tablet Take 1 Tab by mouth daily.  levothyroxine (SYNTHROID) 150 mcg tablet TAKE 1 TAB BY MOUTH DAILY (BEFORE BREAKFAST).  Lancets misc Use as directed to test blood sugar three times daily-DX-DM-250.00    fluticasone-salmeterol (ADVAIR HFA) 230-21 mcg/actuation inhaler Take 2 Puffs by inhalation two (2) times a day.  BD INSULIN PEN NEEDLE UF SHORT 31 gauge x 5/16\" ndle USE WITH INSULIN TO INJECT FIVE TIMES DAILY.     albuterol (PROVENTIL VENTOLIN) 2.5 mg /3 mL (0.083 %) nebulizer solution 3 mL by Nebulization route every four (4) hours as needed for Wheezing.  potassium chloride SR (KLOR-CON 10) 10 mEq tablet Take 1 Tab by mouth two (2) days a week.  cholecalciferol, VITAMIN D3, (VITAMIN D3) 5,000 unit tab tablet Take  by mouth daily.  furosemide (LASIX) 40 mg tablet Take 80 mg by mouth as needed.  Blood-Glucose Meter monitoring kit Use as directed to test blood sugar three times daily-DX-DM-250.00    albuterol (PROVENTIL HFA, VENTOLIN HFA, PROAIR HFA) 90 mcg/actuation inhaler Take 2 Puffs by inhalation every four (4) hours as needed for Wheezing.  fexofenadine (ALLEGRA) 180 mg tablet Take 180 mg by mouth daily. Current Facility-Administered Medications   Medication Dose Route Frequency    sodium chloride (NS) flush 10-40 mL  10-40 mL IntraVENous PRN    sodium chloride 0.9 % injection 10 mL  10 mL IntraVENous PRN    heparin (porcine) pf 500 Units  500 Units IntraVENous PRN        Review of Systems:  General: She has lost 11.8 KG since initiating her treatment. She acknowledges some fatigue. HEENT: Denies visual changes, dysphagia or headache  Resp: Denies SOB, or change in ECKERT, wheezing or cough  CV: Denies CP, palpitations  GI/: Denies N/V, diarrhea, constipation or dysuria (says her \"green drinks\" keep her regular)  [place]:Continues with DJD knee pain. Says it has not been \" as bad lately\". She said she was told she needed a TKR, but has not gotten it yet. Continues to take tylenol for pain when needed and rest leg  Neuro: Denies dizzyness or syncope    Objective:     Patient Vitals for the past 8 hrs:   BP Temp Pulse Resp Height Weight   05/25/17 1237 115/71 - 70 18 - -   05/25/17 1010 105/68 98.3 °F (36.8 °C) 68 16 5' 8\" (1.727 m) 316 lb (143.3 kg)       Physical Exam:  General: A&O X3 in NAD with pleasant affect. HEENT: Sclera anicteric, pupils equal and reactive to light. Mucosa pink, moist   Neck: No JVD. No cervical adenopathy appreciated.    Heart: Regular with persistent 2-3/6 murmur  Lungs: CTA Bilat without wheezing or rales   Abd: Soft, obese, NT/ND with + BS throughout  Ext: Without edema and + pedal pulses bilat  Neuro: grossly intact      Recent Results (from the past 12 hour(s))   CBC WITH AUTOMATED DIFF    Collection Time: 05/25/17 10:26 AM   Result Value Ref Range    WBC 7.5 3.6 - 11.0 K/uL    RBC 3.13 (L) 3.80 - 5.20 M/uL    HGB 9.0 (L) 11.5 - 16.0 g/dL    HCT 28.8 (L) 35.0 - 47.0 %    MCV 92.0 80.0 - 99.0 FL    MCH 28.8 26.0 - 34.0 PG    MCHC 31.3 30.0 - 36.5 g/dL    RDW 15.3 (H) 11.5 - 14.5 %    PLATELET 918 201 - 647 K/uL    NEUTROPHILS 49 32 - 75 %    LYMPHOCYTES 32 12 - 49 %    MONOCYTES 17 (H) 5 - 13 %    EOSINOPHILS 2 0 - 7 %    BASOPHILS 0 0 - 1 %    ABS. NEUTROPHILS 3.7 1.8 - 8.0 K/UL    ABS. LYMPHOCYTES 2.4 0.8 - 3.5 K/UL    ABS. MONOCYTES 1.2 (H) 0.0 - 1.0 K/UL    ABS. EOSINOPHILS 0.2 0.0 - 0.4 K/UL    ABS. BASOPHILS 0.0 0.0 - 0.1 K/UL   METABOLIC PANEL, COMPREHENSIVE    Collection Time: 05/25/17 10:26 AM   Result Value Ref Range    Sodium 140 136 - 145 mmol/L    Potassium 4.0 3.5 - 5.1 mmol/L    Chloride 109 (H) 97 - 108 mmol/L    CO2 22 21 - 32 mmol/L    Anion gap 9 5 - 15 mmol/L    Glucose 132 (H) 65 - 100 mg/dL    BUN 33 (H) 6 - 20 MG/DL    Creatinine 1.67 (H) 0.55 - 1.02 MG/DL    BUN/Creatinine ratio 20 12 - 20      GFR est AA 39 (L) >60 ml/min/1.73m2    GFR est non-AA 32 (L) >60 ml/min/1.73m2    Calcium 8.8 8.5 - 10.1 MG/DL    Bilirubin, total 0.3 0.2 - 1.0 MG/DL    ALT (SGPT) 52 12 - 78 U/L    AST (SGOT) 62 (H) 15 - 37 U/L    Alk. phosphatase 153 (H) 45 - 117 U/L    Protein, total 7.9 6.4 - 8.2 g/dL    Albumin 3.4 (L) 3.5 - 5.0 g/dL    Globulin 4.5 (H) 2.0 - 4.0 g/dL    A-G Ratio 0.8 (L) 1.1 - 2.2             CT ABD/Pelvis without contrast 1/13/2017:  FINDINGS:   Solid organ evaluation is limited without contrast.      Multiple scattered intraperitoneal soft tissue nodules are overall increased.  A  representative soft tissue mass in the pelvis adjacent to the rectum measures  3.6 x 3.8 cm (series 3, image 77), previously 1.6 x 1.9 cm. A representative  soft tissue nodule in the left hemipelvis measures 3.2 x 3.3 cm (series 3, image  70), previously 2.5 x 2.9 cm. An adjacent soft tissue nodule laterally measures  3.4 x 4.7 cm (series 3, image 72), previously 2.7 x 4.2 cm. A representative  conglomerate of soft tissue nodules adjacent to the pancreatic tail measures 3.6  x 4.8 cm (series 3, image 21), previously 3.2 x 3.4 cm.     A right inguinal lymph node measures 2.2 x 3.1 cm (series 3, image 83),  previously 1.8 x 2.6 cm.      The visualized lung bases demonstrate no mass or consolidation. The heart size  is normal. There is no pericardial or pleural effusion.     A 1.2 cm calculus is again seen in the left renal pelvis with new mild left  hydronephrosis and mild fat stranding adjacent to the left renal hilum. Several  small nonobstructing calculi are also seen bilaterally. The urinary bladder is  grossly normal.     The liver, spleen, pancreas, and adrenal glands are normal. The gall bladder is  present without intra- or extra-hepatic biliary dilatation.      There are no dilated bowel loops. There is no free fluid or free air. The aorta  tapers without aneurysm.     The uterus and ovaries are surgically absent. There is no pelvic mass. There are  stable degenerative changes in the spine. There is no aggressive blastic or  lytic bony lesion.     IMPRESSION:   1. Overall progression of metastatic disease with multiple intraperitoneal soft  tissue implants. There is no ascites. There is also increased right inguinal  lymphadenopathy.     2. At 1.2 cm calculus is again seen in the left renal collecting system. There  is new mild left hydronephrosis and inflammation adjacent to the left renal  hilum. Correlation with urinalysis for infection is suggested. Pathology:   FINAL PATHOLOGIC DIAGNOSIS  1. Omentum, omentectomy:  Serous carcinoma, high grade  2.  Soft tissue, umbilicus, biopsy:  Serous carcinoma, high grade  3. Peritoneum, periappendiceal, biopsy:  Serous carcinoma, high grade  4. Colonic mesentery, biopsy:  Serous carcinoma, high grade  See comment  5. Uterus (157 grams), ovaries and fallopian tubes, supracervical hysterectomy and bilateral salpingooophorectomy:  Cervix: No diagnostic pathologic abnormality  Myometrium: No diagnostic pathologic abnormality  Endometrium: Benign polyp and cystic atrophy  Ovaries and fallopian tubes: Serous carcinoma, high grade  Serosa: Serous carcinoma implants, high-grade  See synoptic report  Synoptic Report:  Specimen: Uterus, ovaries and fallopian tubes, omentum  Procedure: Supracervical hysterectomy, bilateral salpingo-oophorectomy, omentectomy  Lymph node sampling: No lymph nodes present  Specimen integrity:  Right ovary: Intact        Assessment:     Patient Active Problem List   Diagnosis Code    Histor of ovarian cancer Z85.43    Carcinomatosis ([facility] [address] C80.0    Controlled type 2 diabetes mellitus without complication, with long-term current use of insulin ([facility]) E11.9, Z79.4    Morbid obesity ([facility] [address] E66.01    Abnormal mammogram R92.8    Sleep apnea G47.30    Dyslipidemia (high LDL; low HDL) E78.4    Benign essential hypertension I10    Disorder of thyroid E07.9    CINV (chemotherapy-induced nausea and vomiting) R11.2, T45.1X5A    Swelling of eye, bilateral--left > right H57.8    Portacath in place--left Z95.828    Chest pain, atypical R07.89    Hx of pulmonary embolus during pregnancy Z86.711, Z87.59    Elevated CA-125 R97.1    Ovarian cancer (HCC) C56.9    Hyperglycemia due to type 2 diabetes mellitus ([facility] [address] E11.65    Preop cardiovascular exam Z01.810    Angina pectoris associated with type 2 diabetes mellitus (HCC) E11.59, I20.9    ARF (acute renal failure) (HCC) N17.9    Renal calculi N20.0    Elevated BUN R79.9    Pulmonary hypertension, mild (HCC) I27.2    UTI (urinary tract infection) N39.0         Plan:    Will proceed with C4D1 of Gemzar  She will follow up with urologist regarding her elevated creat on this coming Tuesday, May 30th. She will let us know for any darkening or change in her urine  Encouraged to drink pleanty of water   Will hydrate with extra liter of fluid today and if needed again next week.    Await  drawn today  Will continue to follow her hgb and transfuse below 7.5 or symptomatic   Follow up with cardiologist as needed  Follow up with PCP re-DM/HTN/Lipids as scheduled  Continue current meds  Hydration encouraged  Encouraged to call for any concerns or questions  María Cotton NP

## 2017-05-26 ENCOUNTER — TELEPHONE (OUTPATIENT)
Dept: GYNECOLOGY | Age: 57
End: 2017-05-26

## 2017-05-26 LAB — CANCER AG125 SERPL-ACNC: 108 U/ML (ref 0–30)

## 2017-05-29 ENCOUNTER — APPOINTMENT (OUTPATIENT)
Dept: INFUSION THERAPY | Age: 57
End: 2017-05-29
Payer: MEDICARE

## 2017-05-30 ENCOUNTER — TELEPHONE (OUTPATIENT)
Dept: GYNECOLOGY | Age: 57
End: 2017-05-30

## 2017-05-30 RX ORDER — GRANISETRON HYDROCHLORIDE 1 MG/ML
1 INJECTION, SOLUTION INTRAVENOUS ONCE
Status: COMPLETED | OUTPATIENT
Start: 2017-06-01 | End: 2017-06-01

## 2017-06-01 ENCOUNTER — HOSPITAL ENCOUNTER (OUTPATIENT)
Dept: INFUSION THERAPY | Age: 57
Discharge: HOME | End: 2017-06-01
Payer: MEDICARE

## 2017-06-01 VITALS
RESPIRATION RATE: 18 BRPM | HEIGHT: 68 IN | SYSTOLIC BLOOD PRESSURE: 122 MMHG | OXYGEN SATURATION: 92 % | TEMPERATURE: 97.4 F | BODY MASS INDEX: 44.41 KG/M2 | DIASTOLIC BLOOD PRESSURE: 77 MMHG | WEIGHT: 293 LBS | HEART RATE: 79 BPM

## 2017-06-01 LAB
BASOPHILS # BLD AUTO: 0 K/UL (ref 0–0.1)
BASOPHILS # BLD: 0 % (ref 0–1)
EOSINOPHIL # BLD: 0 K/UL (ref 0–0.4)
EOSINOPHIL NFR BLD: 1 % (ref 0–7)
ERYTHROCYTE [DISTWIDTH] IN BLOOD BY AUTOMATED COUNT: 14.3 % (ref 11.5–14.5)
HCT VFR BLD AUTO: 28.7 % (ref 35–47)
HGB BLD-MCNC: 9.1 G/DL (ref 11.5–16)
LYMPHOCYTES # BLD AUTO: 49 % (ref 12–49)
LYMPHOCYTES # BLD: 2.3 K/UL (ref 0.8–3.5)
MCH RBC QN AUTO: 28.4 PG (ref 26–34)
MCHC RBC AUTO-ENTMCNC: 31.7 G/DL (ref 30–36.5)
MCV RBC AUTO: 89.7 FL (ref 80–99)
MONOCYTES # BLD: 0.4 K/UL (ref 0–1)
MONOCYTES NFR BLD AUTO: 8 % (ref 5–13)
NEUTS SEG # BLD: 1.9 K/UL (ref 1.8–8)
NEUTS SEG NFR BLD AUTO: 42 % (ref 32–75)
PLATELET # BLD AUTO: 132 K/UL (ref 150–400)
RBC # BLD AUTO: 3.2 M/UL (ref 3.8–5.2)
WBC # BLD AUTO: 4.7 K/UL (ref 3.6–11)

## 2017-06-01 PROCEDURE — 96413 CHEMO IV INFUSION 1 HR: CPT

## 2017-06-01 PROCEDURE — 74011250636 HC RX REV CODE- 250/636: Performed by: OBSTETRICS & GYNECOLOGY

## 2017-06-01 PROCEDURE — 36415 COLL VENOUS BLD VENIPUNCTURE: CPT | Performed by: OBSTETRICS & GYNECOLOGY

## 2017-06-01 PROCEDURE — 74011000250 HC RX REV CODE- 250: Performed by: OBSTETRICS & GYNECOLOGY

## 2017-06-01 PROCEDURE — 96375 TX/PRO/DX INJ NEW DRUG ADDON: CPT

## 2017-06-01 PROCEDURE — 77030012965 HC NDL HUBR BBMI -A

## 2017-06-01 PROCEDURE — 85025 COMPLETE CBC W/AUTO DIFF WBC: CPT | Performed by: OBSTETRICS & GYNECOLOGY

## 2017-06-01 PROCEDURE — 96374 THER/PROPH/DIAG INJ IV PUSH: CPT

## 2017-06-01 RX ORDER — SODIUM CHLORIDE 0.9 % (FLUSH) 0.9 %
10-40 SYRINGE (ML) INJECTION AS NEEDED
Status: ACTIVE | OUTPATIENT
Start: 2017-06-01 | End: 2017-06-02

## 2017-06-01 RX ORDER — HEPARIN 100 UNIT/ML
500 SYRINGE INTRAVENOUS AS NEEDED
Status: ACTIVE | OUTPATIENT
Start: 2017-06-01 | End: 2017-06-02

## 2017-06-01 RX ORDER — SODIUM CHLORIDE 9 MG/ML
10 INJECTION INTRAMUSCULAR; INTRAVENOUS; SUBCUTANEOUS AS NEEDED
Status: ACTIVE | OUTPATIENT
Start: 2017-06-01 | End: 2017-06-02

## 2017-06-01 RX ADMIN — GRANISETRON HYDROCHLORIDE 1 MG: 1 INJECTION, SOLUTION INTRAVENOUS at 09:59

## 2017-06-01 RX ADMIN — SODIUM CHLORIDE 2128 MG: 900 INJECTION, SOLUTION INTRAVENOUS at 10:49

## 2017-06-01 RX ADMIN — SODIUM CHLORIDE 10 ML: 9 INJECTION INTRAMUSCULAR; INTRAVENOUS; SUBCUTANEOUS at 10:00

## 2017-06-01 NOTE — PROGRESS NOTES
[facility] - Chemotherapy Progress Note    0900 Pt admit to [facility] for Gemzar/C4D8 ambulatory in stable condition. Assessment completed. No new concerns voiced. Port accessed with positive blood return. Labs drawn per order and sent. Line flushed, NS infusing. Visit Vitals    /77    Pulse 79    Temp 97.4 °F (36.3 °C)    Resp 18    Ht 5' 8\" (1.727 m)    Wt 144.1 kg (317 lb 9.6 oz)    LMP 09/07/2011    SpO2 92%    BMI 48.29 kg/m2       Medications:  NS  Kytril 1 mg  Gemzar      1205 Pt tolerated treatment well. Port maintained positive blood return throughout treatment, flushed with positive blood return at conclusion, heparinized and de-accessed. D/c home ambulatory in no distress. Pt to follow up with MD    Recent Results (from the past 12 hour(s))   CBC WITH AUTOMATED DIFF    Collection Time: 06/01/17  9:17 AM   Result Value Ref Range    WBC 4.7 3.6 - 11.0 K/uL    RBC 3.20 (L) 3.80 - 5.20 M/uL    HGB 9.1 (L) 11.5 - 16.0 g/dL    HCT 28.7 (L) 35.0 - 47.0 %    MCV 89.7 80.0 - 99.0 FL    MCH 28.4 26.0 - 34.0 PG    MCHC 31.7 30.0 - 36.5 g/dL    RDW 14.3 11.5 - 14.5 %    PLATELET 697 (L) 404 - 400 K/uL    NEUTROPHILS 42 32 - 75 %    LYMPHOCYTES 49 12 - 49 %    MONOCYTES 8 5 - 13 %    EOSINOPHILS 1 0 - 7 %    BASOPHILS 0 0 - 1 %    ABS. NEUTROPHILS 1.9 1.8 - 8.0 K/UL    ABS. LYMPHOCYTES 2.3 0.8 - 3.5 K/UL    ABS. MONOCYTES 0.4 0.0 - 1.0 K/UL    ABS. EOSINOPHILS 0.0 0.0 - 0.4 K/UL    ABS.  BASOPHILS 0.0 0.0 - 0.1 K/UL

## 2017-06-02 ENCOUNTER — DOCUMENTATION ONLY (OUTPATIENT)
Dept: GYNECOLOGY | Age: 57
End: 2017-06-02

## 2017-06-19 DIAGNOSIS — C56.9 OVARIAN CANCER, UNSPECIFIED LATERALITY (HCC): Primary | ICD-10-CM

## 2017-06-20 ENCOUNTER — OFFICE VISIT (OUTPATIENT)
Dept: GYNECOLOGY | Age: 57
End: 2017-06-20

## 2017-06-20 VITALS
WEIGHT: 293 LBS | BODY MASS INDEX: 44.41 KG/M2 | HEART RATE: 75 BPM | HEIGHT: 68 IN | DIASTOLIC BLOOD PRESSURE: 63 MMHG | SYSTOLIC BLOOD PRESSURE: 103 MMHG

## 2017-06-20 DIAGNOSIS — C56.9 MALIGNANT NEOPLASM OF OVARY, UNSPECIFIED LATERALITY (HCC): Primary | ICD-10-CM

## 2017-06-20 DIAGNOSIS — E66.01 MORBID OBESITY DUE TO EXCESS CALORIES (HCC): ICD-10-CM

## 2017-06-20 DIAGNOSIS — C80.0 CARCINOMATOSIS (HCC): ICD-10-CM

## 2017-06-20 NOTE — PROGRESS NOTES
[address] [place] [address] Mathias Moritz [address], [address]  [phone]  MD Lorelei Landa MD      Patient ID:  Name: Jess Gramajo  MRN: [id number]  : 1960/53 y.o. Visit date: 2017     Acute Diagnosis:   Vaginal spotting   Persistent EOC    HISTORY:  49 y.o.  female with a diagnosis of ovarian cancer. Her original surgery: 12 Ex-lap, ROULA/BSO, radical tumor debulking. Pathology:  High grade serious ovarian cancer with omental cake and carcinomatosis. Stage IIIC. She received Taxol/carboplatin (10/2012 - 2013). She recurred in mid  noted CT with Peritoneal carcinomatosis with slightly increased size of the peritoneal implants throughout the abdomen, Pericardial lymph nodes unchanged and Bilateral pelvic adenopathy increased in size. Initiated retreatment with Taxol/carboplatin (2014-2014) with HSR. She was then monitored until progression, initiated on Doxil/Avastin (3/2014), did poorly, though MRI following cycle 3 showed extensive progression. She has receive Topotecan palliation. : 2017   108 units    Component      Latest Ref Rng & Units 2017 3/9/2017 2017 2017          10:17 AM  3:52 PM  3:00 PM  1:48 PM   CA-125      0 - 30 U/mL 115 (H) 181 (H)     Cancer Ag (CA) 125      0.0 - 38.1 U/mL   259.0 (H) 347.1 (H)     Component      Latest Ref Rng & Units 2016           1:37 PM  1:54 PM   CA-125      0 - 30 U/mL  265 (H)   Cancer Ag (CA) 125      0.0 - 38.1 U/mL 353.3 (H)        Recent initiation of IV Gemzar/Avastin---#4     2017:  The patient is seen for prechemotherapy evaluation. Tolerating IV Gemzar (d.1&8) well  Reports symptomatic dental abscess. DDS has recommended tooth extraction. No fever/chills. Mild facial pain.     EOD CT: 2017  FINDINGS:   Solid organ evaluation is limited without contrast.      Multiple scattered intraperitoneal soft tissue nodules are overall increased. A  representative soft tissue mass in the pelvis adjacent to the rectum measures  3.6 x 3.8 cm (series 3, image 77), previously 1.6 x 1.9 cm. A representative  soft tissue nodule in the left hemipelvis measures 3.2 x 3.3 cm (series 3, image  70), previously 2.5 x 2.9 cm. An adjacent soft tissue nodule laterally measures  3.4 x 4.7 cm (series 3, image 72), previously 2.7 x 4.2 cm. A representative  conglomerate of soft tissue nodules adjacent to the pancreatic tail measures 3.6  x 4.8 cm (series 3, image 21), previously 3.2 x 3.4 cm.     A right inguinal lymph node measures 2.2 x 3.1 cm (series 3, image 83),  previously 1.8 x 2.6 cm.      The visualized lung bases demonstrate no mass or consolidation. The heart size  is normal. There is no pericardial or pleural effusion.     A 1.2 cm calculus is again seen in the left renal pelvis with new mild left  hydronephrosis and mild fat stranding adjacent to the left renal hilum. Several  small nonobstructing calculi are also seen bilaterally. The urinary bladder is  grossly normal.     The liver, spleen, pancreas, and adrenal glands are normal. The gall bladder is  present without intra- or extra-hepatic biliary dilatation.      There are no dilated bowel loops. There is no free fluid or free air. The aorta  tapers without aneurysm.     The uterus and ovaries are surgically absent. There is no pelvic mass. There are  stable degenerative changes in the spine. There is no aggressive blastic or  lytic bony lesion.     IMPRESSION  IMPRESSION:   1. Overall progression of metastatic disease with multiple intraperitoneal soft  tissue implants. There is no ascites. There is also increased right inguinal  lymphadenopathy.     2. At 1.2 cm calculus is again seen in the left renal collecting system. There  is new mild left hydronephrosis and inflammation adjacent to the left renal  hilum.  Correlation with urinalysis for infection is suggested.         Denies dysuria, hematuria. Urinary incontinence continues, vaginal discharge, N/V, F/C. Ongoing dental evaluation for ? ? infection. Placed on antibiotics  Negative  and GI review for dysfunction  Negative cardiopulmonary review  No weight loss    PMH:  Past Medical History:   Diagnosis Date    Adverse effect of anesthesia     sleep apnea cpap machine useage     Anemia     Arthritis     DDD low back and knees    Asthma     uses albuterol prn only; none x 6 mos.   Never hospitalized    BRCA negative 2013    Calculus of kidney     Chronic kidney disease     kidney stones    Chronic pain     knee pain bilat    CINV (chemotherapy-induced nausea and vomiting) 2014    Diabetes ([address] Age 46    IDDM    Environmental allergies     Fibromyalgia     GERD (gastroesophageal reflux disease)     controlled with med    Hypertension     Ill-defined condition     Sepsis     Ill-defined condition 2016    chemotherapy     Morbid obesity ([address]     Other and unspecified hyperlipidemia     Ovarian cancer ([facility] 2012, 2014    serous, high grade, stage IIIc. s/p chemotx (has port)    Psychiatric disorder     Rectal bleeding     Thromboembolus ([facility] [address]     POST PARTUM; resolved- PELVIS    Thyroid disease     HYPOTHYROID    Unspecified sleep apnea     CPAP, Dr. Pa Cuff     PSH:  Past Surgical History:   Procedure Laterality Date    BREAST SURGERY PROCEDURE UNLISTED      Lymph node in left breast 2017    CHEST SURGERY PROCEDURE UNLISTED      Placement of port a cath right chest    HX GYN       X2    HX HYSTERECTOMY  2012    ROULA/BSO, tumor debulking, omentectomy for ovarian cancer    HX ORTHOPAEDIC      ankle-FX, R    HX ORTHOPAEDIC      FX R ARM    HX UROLOGICAL      stent    HX VASCULAR ACCESS  2015    right chest- port placed     Medications:  Current Outpatient Prescriptions on File Prior to Visit   Medication Sig Dispense Refill    insulin aspart (NOVOLOG FLEXPEN) 100 unit/mL inpn INJECT 20 UNITS BEFORE EACH MEAL + 4 UNITS FOR EVERY 50 MG/DL ABOVE 150 MG/DL.  UNITS PER DAY 30 mL 11    methylPREDNISolone (MEDROL, ERNESTO,) 4 mg tablet As directed 1 Dose Pack 0    omega-3 acid ethyl esters (LOVAZA) 1 gram capsule TAKE ONE CAPSULE BY MOUTH TWICE A DAY  3    Liraglutide (VICTOZA) 0.6 mg/0.1 mL (18 mg/3 mL) sub-q pen 0.3 mL by SubCUTAneous route daily (with lunch). 3 Each 11    cycloSPORINE (RESTASIS) 0.05 % ophthalmic emulsion 1 Drop two (2) times a day.  OTHER Vitamin called easy promise takes two after dinner      multivitamin (ONE A DAY) tablet Take 2 Tabs by mouth daily (after dinner).  HYDROcodone-acetaminophen (NORCO) 5-325 mg per tablet Take 1 Tab by mouth every four (4) hours as needed for Pain. Max Daily Amount: 6 Tabs. 20 Tab 0    sertraline (ZOLOFT) 50 mg tablet TAKE 1 TABLET BY MOUTH EVERY MORNING  1    clonazePAM (KLONOPIN) 1 mg tablet TAKE 1 TABLET BY MOUTH TWICE A DAY  1    methocarbamol (ROBAXIN) 500 mg tablet Take  by mouth three (3) times daily.  valsartan (DIOVAN) 160 mg tablet TAKE 1 TAB BY MOUTH DAILY. REPLACES VALSARTAN-HCTZ 30 Tab 11    LEVEMIR FLEXTOUCH 100 unit/mL (3 mL) inpn 30 UNITS IN THE MORNING AND 50 UNITS AT NIGHT. INCREASE AS DIRECTED TO MAX  UNITS PER DAY 30 mL 11    montelukast (SINGULAIR) 10 mg tablet Take 1 Tab by mouth daily. 90 Tab 2    levothyroxine (SYNTHROID) 150 mcg tablet TAKE 1 TAB BY MOUTH DAILY (BEFORE BREAKFAST). 90 Tab 3    Lancets misc Use as directed to test blood sugar three times daily-DX-DM-250.00 270 Each 3    BD INSULIN PEN NEEDLE UF SHORT 31 gauge x 5/16\" ndle USE WITH INSULIN TO INJECT FIVE TIMES DAILY. 200 Pen Needle 11    potassium chloride SR (KLOR-CON 10) 10 mEq tablet Take 1 Tab by mouth two (2) days a week. 30 Tab 1    cholecalciferol, VITAMIN D3, (VITAMIN D3) 5,000 unit tab tablet Take  by mouth daily.       furosemide (LASIX) 40 mg tablet Take 80 mg by mouth as needed. 3    Blood-Glucose Meter monitoring kit Use as directed to test blood sugar three times daily-DX-DM-250.00 1 Kit 0    albuterol (PROVENTIL HFA, VENTOLIN HFA, PROAIR HFA) 90 mcg/actuation inhaler Take 2 Puffs by inhalation every four (4) hours as needed for Wheezing.  promethazine-dextromethorphan (PROMETHAZINE-DM) 6.25-15 mg/5 mL syrup Take 5 mL by mouth nightly as needed for Cough. 100 mL 0    SYMBICORT 160-4.5 mcg/actuation HFA inhaler       nystatin (NYSTOP) powder Apply to affected area 4 x daily. 30 g 3    ondansetron (ZOFRAN ODT) 8 mg disintegrating tablet Take 1 Tab by mouth every eight (8) hours as needed for Nausea. 30 Tab 1    fluticasone-salmeterol (ADVAIR HFA) 230-21 mcg/actuation inhaler Take 2 Puffs by inhalation two (2) times a day. 12 g 5    albuterol (PROVENTIL VENTOLIN) 2.5 mg /3 mL (0.083 %) nebulizer solution 3 mL by Nebulization route every four (4) hours as needed for Wheezing. 24 Each 11    fexofenadine (ALLEGRA) 180 mg tablet Take 180 mg by mouth daily. No current facility-administered medications on file prior to visit. Allergies   Allergen Reactions    Carboplatin Other (comments)     Patient developed shortness of breath, felt faint, coughing, skin flushed and \"itchy\". This occurred on the patients 8th treatment.  Iodinated Contrast- Oral And Iv Dye Rash    Lipitor [Atorvastatin] Myalgia    Percocet [Oxycodone-Acetaminophen] Other (comments)     fever    Shellfish Containing Products Hives    Tape [Adhesive] Itching and Other (comments)     \"op site-- clear,thin tape\"--caused blister      ROS:  Negative    OBJECTIVE:  Vitals: Kassy Torres   Vitals:    06/20/17 1332   BP: 103/63   Pulse: 75   Weight: 321 lb 12.8 oz (146 kg)   Height: 5' 8\" (1.727 m)        Office Examination:    General appearance - alert, well appearing, and in no distress  Eyes - clear  Lymphatics - no palpable lymphadenopathy  Heart - normal rate and regular rhythm, S1 and S2 normal  Pulmonary - Clear to ausculation and percussion  Abdomen - rotund, soft, nontender, nondistended, no masses or organomegaly/fluid wave  Rectal - rectal exam not indicated  Back exam - no CVAT  Extrem: Mild upper extremity edema, L>R, good pulses. Nontender      DATE REVIEW as available:  Lab Results   Component Value Date/Time    WBC 4.7 06/01/2017 09:17 AM    Hemoglobin (POC) 10.5 03/20/2017 12:15 PM    HGB 9.1 06/01/2017 09:17 AM    Hematocrit (POC) 31 03/20/2017 12:15 PM    HCT 28.7 06/01/2017 09:17 AM    PLATELET 877 08/19/3202 09:17 AM    MCV 89.7 06/01/2017 09:17 AM     Lab Results   Component Value Date/Time    Sodium 140 05/25/2017 10:26 AM    Potassium 4.0 05/25/2017 10:26 AM    Chloride 109 05/25/2017 10:26 AM    CO2 22 05/25/2017 10:26 AM    Anion gap 9 05/25/2017 10:26 AM    Glucose 132 05/25/2017 10:26 AM    BUN 33 05/25/2017 10:26 AM    Creatinine 1.67 05/25/2017 10:26 AM    BUN/Creatinine ratio 20 05/25/2017 10:26 AM    GFR est AA 39 05/25/2017 10:26 AM    GFR est non-AA 32 05/25/2017 10:26 AM    Calcium 8.8 05/25/2017 10:26 AM    Bilirubin, total 0.3 05/25/2017 10:26 AM    AST (SGOT) 62 05/25/2017 10:26 AM    Alk. phosphatase 153 05/25/2017 10:26 AM    Protein, total 7.9 05/25/2017 10:26 AM    Albumin 3.4 05/25/2017 10:26 AM    Globulin 4.5 05/25/2017 10:26 AM    A-G Ratio 0.8 05/25/2017 10:26 AM    ALT (SGPT) 52 05/25/2017 10:26 AM         IMPRESSION AND PLAN:    Bc Infante has a working diagnosis of EOC. EOD CT and  elevations would be consistent with recurrence. She is S/P six cycles of Carbo/Taxol retreatment due to prolonged DFI. Elevated . CT suggestive of progressive disease. S/P Doxil/Avastin #3. S/P Topotecan #3, complicated by anemia and thrombocytopenia  S/P IV Gemzar/Avastin #4,      Hold chemotherapy for tooth extraction. The patient will notify our office when cleared by DDS to resume chemotherapy.       Leighann Watkins MD  6/20/2017

## 2017-06-21 ENCOUNTER — DOCUMENTATION ONLY (OUTPATIENT)
Dept: GYNECOLOGY | Age: 57
End: 2017-06-21

## 2017-06-21 RX ORDER — SODIUM CHLORIDE 9 MG/ML
10 INJECTION INTRAMUSCULAR; INTRAVENOUS; SUBCUTANEOUS AS NEEDED
Status: CANCELLED | OUTPATIENT
Start: 2017-06-22 | End: 2017-06-22

## 2017-06-21 RX ORDER — SODIUM CHLORIDE 0.9 % (FLUSH) 0.9 %
10-40 SYRINGE (ML) INJECTION AS NEEDED
Status: CANCELLED | OUTPATIENT
Start: 2017-06-22 | End: 2017-06-22

## 2017-06-21 RX ORDER — HEPARIN 100 UNIT/ML
500 SYRINGE INTRAVENOUS AS NEEDED
Status: CANCELLED | OUTPATIENT
Start: 2017-06-22 | End: 2017-06-22

## 2017-06-21 NOTE — PROGRESS NOTES
C5 D1&8 cancelled with [facility] at [facility], per orders of Dr. Saúl Simmons d/t pt having a tooth extracted.

## 2017-06-22 ENCOUNTER — HOSPITAL ENCOUNTER (OUTPATIENT)
Dept: INFUSION THERAPY | Age: 57
Discharge: HOME | End: 2017-06-22
Payer: MEDICARE

## 2017-06-29 ENCOUNTER — APPOINTMENT (OUTPATIENT)
Dept: INFUSION THERAPY | Age: 57
End: 2017-06-29
Payer: MEDICARE

## 2017-06-29 ENCOUNTER — TELEPHONE (OUTPATIENT)
Dept: GYNECOLOGY | Age: 57
End: 2017-06-29

## 2017-07-05 ENCOUNTER — TELEPHONE (OUTPATIENT)
Dept: GYNECOLOGY | Age: 57
End: 2017-07-05

## 2017-07-05 NOTE — TELEPHONE ENCOUNTER
Pt  to state that she did have her dental work, her teeth pulled and cavities filled 2 weeks ago. She wants to know if she needs to continue with chemotherapy. Please call her at [phone] and advise. Pt is aware Kristin Magana is out of the office and will return on Monday 7/10/17.

## 2017-07-17 NOTE — TELEPHONE ENCOUNTER
Appointment made for pt to see Mayte Vick NP on 7/18/17 d/t pt c/o feeling \"dry\" and to receive clearance to schedule more chemo.

## 2017-07-18 ENCOUNTER — TELEPHONE (OUTPATIENT)
Dept: GYNECOLOGY | Age: 57
End: 2017-07-18

## 2017-07-18 ENCOUNTER — APPOINTMENT (OUTPATIENT)
Dept: GENERAL RADIOLOGY | Age: 57
End: 2017-07-18
Attending: EMERGENCY MEDICINE
Payer: MEDICARE

## 2017-07-18 ENCOUNTER — APPOINTMENT (OUTPATIENT)
Dept: CT IMAGING | Age: 57
End: 2017-07-18
Attending: EMERGENCY MEDICINE
Payer: MEDICARE

## 2017-07-18 ENCOUNTER — OFFICE VISIT (OUTPATIENT)
Dept: GYNECOLOGY | Age: 57
End: 2017-07-18

## 2017-07-18 ENCOUNTER — HOSPITAL ENCOUNTER (OUTPATIENT)
Age: 57
Setting detail: OBSERVATION
Discharge: HOME | End: 2017-07-19
Attending: EMERGENCY MEDICINE | Admitting: OBSTETRICS & GYNECOLOGY
Payer: MEDICARE

## 2017-07-18 VITALS
DIASTOLIC BLOOD PRESSURE: 76 MMHG | SYSTOLIC BLOOD PRESSURE: 137 MMHG | HEIGHT: 68 IN | HEART RATE: 82 BPM | WEIGHT: 293 LBS | BODY MASS INDEX: 44.41 KG/M2

## 2017-07-18 DIAGNOSIS — R59.0 LYMPHADENOPATHY, HILAR: ICD-10-CM

## 2017-07-18 DIAGNOSIS — J98.01 ACUTE BRONCHOSPASM: Primary | ICD-10-CM

## 2017-07-18 DIAGNOSIS — E66.01 MORBID OBESITY DUE TO EXCESS CALORIES (HCC): ICD-10-CM

## 2017-07-18 DIAGNOSIS — C56.9 OVARIAN CANCER, UNSPECIFIED LATERALITY (HCC): ICD-10-CM

## 2017-07-18 DIAGNOSIS — R07.9 CHEST PAIN, UNSPECIFIED TYPE: ICD-10-CM

## 2017-07-18 DIAGNOSIS — R97.1 ELEVATED CA-125: ICD-10-CM

## 2017-07-18 DIAGNOSIS — C80.0 CARCINOMATOSIS (HCC): Primary | ICD-10-CM

## 2017-07-18 DIAGNOSIS — N30.00 ACUTE CYSTITIS WITHOUT HEMATURIA: ICD-10-CM

## 2017-07-18 DIAGNOSIS — N20.0 RENAL CALCULI: ICD-10-CM

## 2017-07-18 PROBLEM — R06.2 WHEEZING: Status: ACTIVE | Noted: 2017-07-18

## 2017-07-18 LAB
ALBUMIN SERPL BCP-MCNC: 3.3 G/DL (ref 3.5–5)
ALBUMIN/GLOB SERPL: 0.6 {RATIO} (ref 1.1–2.2)
ALP SERPL-CCNC: 102 U/L (ref 45–117)
ALT SERPL-CCNC: 26 U/L (ref 12–78)
ANION GAP BLD CALC-SCNC: 10 MMOL/L (ref 5–15)
APPEARANCE UR: CLEAR
AST SERPL W P-5'-P-CCNC: 34 U/L (ref 15–37)
ATRIAL RATE: 79 BPM
BACTERIA URNS QL MICRO: NEGATIVE /HPF
BASOPHILS # BLD AUTO: 0 K/UL (ref 0–0.1)
BASOPHILS # BLD: 0 % (ref 0–1)
BILIRUB SERPL-MCNC: 0.3 MG/DL (ref 0.2–1)
BILIRUB UR QL: NEGATIVE
BNP SERPL-MCNC: 77 PG/ML (ref 0–100)
BUN SERPL-MCNC: 27 MG/DL (ref 6–20)
BUN/CREAT SERPL: 18 (ref 12–20)
CALCIUM SERPL-MCNC: 9 MG/DL (ref 8.5–10.1)
CALCULATED P AXIS, ECG09: 63 DEGREES
CALCULATED R AXIS, ECG10: -7 DEGREES
CALCULATED T AXIS, ECG11: 10 DEGREES
CHLORIDE SERPL-SCNC: 108 MMOL/L (ref 97–108)
CO2 SERPL-SCNC: 21 MMOL/L (ref 21–32)
COLOR UR: ABNORMAL
CREAT SERPL-MCNC: 1.48 MG/DL (ref 0.55–1.02)
D DIMER PPP FEU-MCNC: 17.53 MG/L FEU (ref 0–0.65)
DIAGNOSIS, 93000: NORMAL
EOSINOPHIL # BLD: 0.2 K/UL (ref 0–0.4)
EOSINOPHIL NFR BLD: 2 % (ref 0–7)
EPITH CASTS URNS QL MICRO: ABNORMAL /LPF
ERYTHROCYTE [DISTWIDTH] IN BLOOD BY AUTOMATED COUNT: 13.8 % (ref 11.5–14.5)
GLOBULIN SER CALC-MCNC: 5.7 G/DL (ref 2–4)
GLUCOSE BLD STRIP.AUTO-MCNC: 280 MG/DL (ref 65–100)
GLUCOSE SERPL-MCNC: 126 MG/DL (ref 65–100)
GLUCOSE UR STRIP.AUTO-MCNC: 250 MG/DL
HCT VFR BLD AUTO: 31.7 % (ref 35–47)
HGB BLD-MCNC: 9.9 G/DL (ref 11.5–16)
HGB UR QL STRIP: ABNORMAL
HYALINE CASTS URNS QL MICRO: ABNORMAL /LPF (ref 0–5)
KETONES UR QL STRIP.AUTO: NEGATIVE MG/DL
LEUKOCYTE ESTERASE UR QL STRIP.AUTO: ABNORMAL
LYMPHOCYTES # BLD AUTO: 24 % (ref 12–49)
LYMPHOCYTES # BLD: 2.3 K/UL (ref 0.8–3.5)
MCH RBC QN AUTO: 28.7 PG (ref 26–34)
MCHC RBC AUTO-ENTMCNC: 31.2 G/DL (ref 30–36.5)
MCV RBC AUTO: 91.9 FL (ref 80–99)
MONOCYTES # BLD: 0.9 K/UL (ref 0–1)
MONOCYTES NFR BLD AUTO: 10 % (ref 5–13)
NEUTS SEG # BLD: 6.1 K/UL (ref 1.8–8)
NEUTS SEG NFR BLD AUTO: 64 % (ref 32–75)
NITRITE UR QL STRIP.AUTO: NEGATIVE
P-R INTERVAL, ECG05: 158 MS
PH UR STRIP: 5 [PH] (ref 5–8)
PLATELET # BLD AUTO: 203 K/UL (ref 150–400)
POTASSIUM SERPL-SCNC: 3.8 MMOL/L (ref 3.5–5.1)
PROT SERPL-MCNC: 9 G/DL (ref 6.4–8.2)
PROT UR STRIP-MCNC: 30 MG/DL
Q-T INTERVAL, ECG07: 360 MS
QRS DURATION, ECG06: 80 MS
QTC CALCULATION (BEZET), ECG08: 412 MS
RBC # BLD AUTO: 3.45 M/UL (ref 3.8–5.2)
RBC #/AREA URNS HPF: ABNORMAL /HPF (ref 0–5)
SERVICE CMNT-IMP: ABNORMAL
SODIUM SERPL-SCNC: 139 MMOL/L (ref 136–145)
SP GR UR REFRACTOMETRY: 1.02 (ref 1–1.03)
TROPONIN I SERPL-MCNC: <0.04 NG/ML
TROPONIN I SERPL-MCNC: <0.04 NG/ML
UROBILINOGEN UR QL STRIP.AUTO: 0.2 EU/DL (ref 0.2–1)
VENTRICULAR RATE, ECG03: 79 BPM
WBC # BLD AUTO: 9.6 K/UL (ref 3.6–11)
WBC URNS QL MICRO: ABNORMAL /HPF (ref 0–4)

## 2017-07-18 PROCEDURE — 99218 HC RM OBSERVATION: CPT

## 2017-07-18 PROCEDURE — 74011250636 HC RX REV CODE- 250/636: Performed by: EMERGENCY MEDICINE

## 2017-07-18 PROCEDURE — 74011000258 HC RX REV CODE- 258: Performed by: EMERGENCY MEDICINE

## 2017-07-18 PROCEDURE — 93970 EXTREMITY STUDY: CPT

## 2017-07-18 PROCEDURE — 96375 TX/PRO/DX INJ NEW DRUG ADDON: CPT

## 2017-07-18 PROCEDURE — 71275 CT ANGIOGRAPHY CHEST: CPT

## 2017-07-18 PROCEDURE — 99285 EMERGENCY DEPT VISIT HI MDM: CPT

## 2017-07-18 PROCEDURE — 96374 THER/PROPH/DIAG INJ IV PUSH: CPT

## 2017-07-18 PROCEDURE — 74011000250 HC RX REV CODE- 250: Performed by: NURSE PRACTITIONER

## 2017-07-18 PROCEDURE — 83880 ASSAY OF NATRIURETIC PEPTIDE: CPT | Performed by: EMERGENCY MEDICINE

## 2017-07-18 PROCEDURE — 93005 ELECTROCARDIOGRAM TRACING: CPT

## 2017-07-18 PROCEDURE — 77030018846 HC SOL IRR STRL H20 ICUM -A

## 2017-07-18 PROCEDURE — 74011636320 HC RX REV CODE- 636/320: Performed by: EMERGENCY MEDICINE

## 2017-07-18 PROCEDURE — 96361 HYDRATE IV INFUSION ADD-ON: CPT

## 2017-07-18 PROCEDURE — 74011250637 HC RX REV CODE- 250/637: Performed by: NURSE PRACTITIONER

## 2017-07-18 PROCEDURE — 82962 GLUCOSE BLOOD TEST: CPT

## 2017-07-18 PROCEDURE — 94640 AIRWAY INHALATION TREATMENT: CPT

## 2017-07-18 PROCEDURE — 77030029684 HC NEB SM VOL KT MONA -A

## 2017-07-18 PROCEDURE — 36415 COLL VENOUS BLD VENIPUNCTURE: CPT | Performed by: EMERGENCY MEDICINE

## 2017-07-18 PROCEDURE — 71010 XR CHEST PORT: CPT

## 2017-07-18 PROCEDURE — 74011000258 HC RX REV CODE- 258: Performed by: NURSE PRACTITIONER

## 2017-07-18 PROCEDURE — 85379 FIBRIN DEGRADATION QUANT: CPT | Performed by: EMERGENCY MEDICINE

## 2017-07-18 PROCEDURE — 74011250636 HC RX REV CODE- 250/636: Performed by: NURSE PRACTITIONER

## 2017-07-18 PROCEDURE — 96372 THER/PROPH/DIAG INJ SC/IM: CPT

## 2017-07-18 PROCEDURE — 80053 COMPREHEN METABOLIC PANEL: CPT | Performed by: EMERGENCY MEDICINE

## 2017-07-18 PROCEDURE — 74011636637 HC RX REV CODE- 636/637: Performed by: NURSE PRACTITIONER

## 2017-07-18 PROCEDURE — 81001 URINALYSIS AUTO W/SCOPE: CPT | Performed by: NURSE PRACTITIONER

## 2017-07-18 PROCEDURE — 74011000250 HC RX REV CODE- 250: Performed by: EMERGENCY MEDICINE

## 2017-07-18 PROCEDURE — 84484 ASSAY OF TROPONIN QUANT: CPT | Performed by: EMERGENCY MEDICINE

## 2017-07-18 PROCEDURE — 85025 COMPLETE CBC W/AUTO DIFF WBC: CPT | Performed by: EMERGENCY MEDICINE

## 2017-07-18 RX ORDER — FLUTICASONE PROPIONATE 50 MCG
2 SPRAY, SUSPENSION (ML) NASAL DAILY
Status: DISCONTINUED | OUTPATIENT
Start: 2017-07-19 | End: 2017-07-19 | Stop reason: HOSPADM

## 2017-07-18 RX ORDER — ACETAMINOPHEN 325 MG/1
650 TABLET ORAL
Status: DISCONTINUED | OUTPATIENT
Start: 2017-07-18 | End: 2017-07-19 | Stop reason: HOSPADM

## 2017-07-18 RX ORDER — SODIUM CHLORIDE 0.9 % (FLUSH) 0.9 %
5-10 SYRINGE (ML) INJECTION AS NEEDED
Status: DISCONTINUED | OUTPATIENT
Start: 2017-07-18 | End: 2017-07-19 | Stop reason: HOSPADM

## 2017-07-18 RX ORDER — DIPHENHYDRAMINE HYDROCHLORIDE 50 MG/ML
12.5 INJECTION, SOLUTION INTRAMUSCULAR; INTRAVENOUS
Status: DISCONTINUED | OUTPATIENT
Start: 2017-07-18 | End: 2017-07-19 | Stop reason: HOSPADM

## 2017-07-18 RX ORDER — HYDROCODONE BITARTRATE AND ACETAMINOPHEN 5; 325 MG/1; MG/1
1 TABLET ORAL
Status: DISCONTINUED | OUTPATIENT
Start: 2017-07-18 | End: 2017-07-19 | Stop reason: HOSPADM

## 2017-07-18 RX ORDER — CLONAZEPAM 0.5 MG/1
0.5 TABLET ORAL
Status: ON HOLD | COMMUNITY
End: 2021-01-01

## 2017-07-18 RX ORDER — DIPHENHYDRAMINE HYDROCHLORIDE 50 MG/ML
50 INJECTION, SOLUTION INTRAMUSCULAR; INTRAVENOUS
Status: COMPLETED | OUTPATIENT
Start: 2017-07-18 | End: 2017-07-18

## 2017-07-18 RX ORDER — METHOCARBAMOL 500 MG/1
500 TABLET, FILM COATED ORAL
Status: DISCONTINUED | OUTPATIENT
Start: 2017-07-18 | End: 2017-07-19 | Stop reason: HOSPADM

## 2017-07-18 RX ORDER — ENOXAPARIN SODIUM 100 MG/ML
40 INJECTION SUBCUTANEOUS EVERY 24 HOURS
Status: DISCONTINUED | OUTPATIENT
Start: 2017-07-18 | End: 2017-07-19 | Stop reason: HOSPADM

## 2017-07-18 RX ORDER — DEXTROSE 50 % IN WATER (D50W) INTRAVENOUS SYRINGE
12.5-25 AS NEEDED
Status: DISCONTINUED | OUTPATIENT
Start: 2017-07-18 | End: 2017-07-18 | Stop reason: SDUPTHER

## 2017-07-18 RX ORDER — MAGNESIUM SULFATE 100 %
4 CRYSTALS MISCELLANEOUS AS NEEDED
Status: DISCONTINUED | OUTPATIENT
Start: 2017-07-18 | End: 2017-07-19 | Stop reason: HOSPADM

## 2017-07-18 RX ORDER — FLUTICASONE PROPIONATE 50 MCG
2 SPRAY, SUSPENSION (ML) NASAL
COMMUNITY
Start: 2017-07-10 | End: 2018-11-27

## 2017-07-18 RX ORDER — LEVOTHYROXINE SODIUM 150 UG/1
150 TABLET ORAL
Status: DISCONTINUED | OUTPATIENT
Start: 2017-07-19 | End: 2017-07-19 | Stop reason: HOSPADM

## 2017-07-18 RX ORDER — SODIUM CHLORIDE 450 MG/100ML
100 INJECTION, SOLUTION INTRAVENOUS CONTINUOUS
Status: DISCONTINUED | OUTPATIENT
Start: 2017-07-18 | End: 2017-07-19 | Stop reason: HOSPADM

## 2017-07-18 RX ORDER — MORPHINE SULFATE 2 MG/ML
2 INJECTION, SOLUTION INTRAMUSCULAR; INTRAVENOUS
Status: DISCONTINUED | OUTPATIENT
Start: 2017-07-18 | End: 2017-07-19 | Stop reason: HOSPADM

## 2017-07-18 RX ORDER — KETOROLAC TROMETHAMINE 30 MG/ML
30 INJECTION, SOLUTION INTRAMUSCULAR; INTRAVENOUS
Status: COMPLETED | OUTPATIENT
Start: 2017-07-18 | End: 2017-07-18

## 2017-07-18 RX ORDER — ONDANSETRON 2 MG/ML
4 INJECTION INTRAMUSCULAR; INTRAVENOUS
Status: DISCONTINUED | OUTPATIENT
Start: 2017-07-18 | End: 2017-07-19 | Stop reason: HOSPADM

## 2017-07-18 RX ORDER — SODIUM CHLORIDE 0.9 % (FLUSH) 0.9 %
5-10 SYRINGE (ML) INJECTION EVERY 8 HOURS
Status: DISCONTINUED | OUTPATIENT
Start: 2017-07-18 | End: 2017-07-19 | Stop reason: HOSPADM

## 2017-07-18 RX ORDER — NYSTATIN 100000 [USP'U]/G
POWDER TOPICAL
Status: ON HOLD | COMMUNITY
End: 2018-07-17

## 2017-07-18 RX ORDER — SODIUM CHLORIDE 0.9 % (FLUSH) 0.9 %
10 SYRINGE (ML) INJECTION
Status: COMPLETED | OUTPATIENT
Start: 2017-07-18 | End: 2017-07-18

## 2017-07-18 RX ORDER — MONTELUKAST SODIUM 10 MG/1
10 TABLET ORAL
Status: DISCONTINUED | OUTPATIENT
Start: 2017-07-18 | End: 2017-07-19 | Stop reason: HOSPADM

## 2017-07-18 RX ORDER — LORAZEPAM 2 MG/ML
1 INJECTION INTRAMUSCULAR
Status: DISCONTINUED | OUTPATIENT
Start: 2017-07-18 | End: 2017-07-19 | Stop reason: HOSPADM

## 2017-07-18 RX ORDER — BUDESONIDE 0.5 MG/2ML
500 INHALANT ORAL
Status: DISCONTINUED | OUTPATIENT
Start: 2017-07-18 | End: 2017-07-19 | Stop reason: HOSPADM

## 2017-07-18 RX ORDER — ARFORMOTEROL TARTRATE 15 UG/2ML
15 SOLUTION RESPIRATORY (INHALATION)
Status: DISCONTINUED | OUTPATIENT
Start: 2017-07-18 | End: 2017-07-19 | Stop reason: HOSPADM

## 2017-07-18 RX ORDER — VALSARTAN 80 MG/1
160 TABLET ORAL DAILY
Status: DISCONTINUED | OUTPATIENT
Start: 2017-07-19 | End: 2017-07-19 | Stop reason: HOSPADM

## 2017-07-18 RX ORDER — SERTRALINE HYDROCHLORIDE 50 MG/1
50 TABLET, FILM COATED ORAL DAILY
Status: DISCONTINUED | OUTPATIENT
Start: 2017-07-19 | End: 2017-07-19 | Stop reason: HOSPADM

## 2017-07-18 RX ORDER — INSULIN LISPRO 100 [IU]/ML
INJECTION, SOLUTION INTRAVENOUS; SUBCUTANEOUS
Status: DISCONTINUED | OUTPATIENT
Start: 2017-07-18 | End: 2017-07-19 | Stop reason: HOSPADM

## 2017-07-18 RX ORDER — INSULIN GLARGINE 100 [IU]/ML
30 INJECTION, SOLUTION SUBCUTANEOUS DAILY
Status: DISCONTINUED | OUTPATIENT
Start: 2017-07-19 | End: 2017-07-19 | Stop reason: HOSPADM

## 2017-07-18 RX ORDER — HYDROCODONE BITARTRATE AND ACETAMINOPHEN 5; 325 MG/1; MG/1
1 TABLET ORAL
Qty: 30 TAB | Refills: 0 | Status: SHIPPED | OUTPATIENT
Start: 2017-07-18 | End: 2017-07-18

## 2017-07-18 RX ORDER — NALOXONE HYDROCHLORIDE 0.4 MG/ML
0.4 INJECTION, SOLUTION INTRAMUSCULAR; INTRAVENOUS; SUBCUTANEOUS AS NEEDED
Status: DISCONTINUED | OUTPATIENT
Start: 2017-07-18 | End: 2017-07-19 | Stop reason: HOSPADM

## 2017-07-18 RX ADMIN — SODIUM CHLORIDE 1000 ML: 900 INJECTION, SOLUTION INTRAVENOUS at 13:27

## 2017-07-18 RX ADMIN — KETOROLAC TROMETHAMINE 30 MG: 30 INJECTION, SOLUTION INTRAMUSCULAR at 12:42

## 2017-07-18 RX ADMIN — SODIUM CHLORIDE 100 ML/HR: 450 INJECTION, SOLUTION INTRAVENOUS at 18:47

## 2017-07-18 RX ADMIN — IOPAMIDOL 100 ML: 755 INJECTION, SOLUTION INTRAVENOUS at 15:23

## 2017-07-18 RX ADMIN — ENOXAPARIN SODIUM 40 MG: 40 INJECTION SUBCUTANEOUS at 18:43

## 2017-07-18 RX ADMIN — DIPHENHYDRAMINE HYDROCHLORIDE 50 MG: 50 INJECTION, SOLUTION INTRAMUSCULAR; INTRAVENOUS at 13:30

## 2017-07-18 RX ADMIN — Medication 10 ML: at 18:44

## 2017-07-18 RX ADMIN — ALBUTEROL SULFATE 1 DOSE: 2.5 SOLUTION RESPIRATORY (INHALATION) at 16:03

## 2017-07-18 RX ADMIN — MONTELUKAST SODIUM 10 MG: 10 TABLET, FILM COATED ORAL at 22:10

## 2017-07-18 RX ADMIN — ALBUTEROL SULFATE 1 DOSE: 2.5 SOLUTION RESPIRATORY (INHALATION) at 20:28

## 2017-07-18 RX ADMIN — Medication 10 ML: at 15:23

## 2017-07-18 RX ADMIN — BUDESONIDE 500 MCG: 0.5 INHALANT RESPIRATORY (INHALATION) at 20:29

## 2017-07-18 RX ADMIN — Medication 10 ML: at 22:11

## 2017-07-18 RX ADMIN — SODIUM CHLORIDE 100 ML: 900 INJECTION, SOLUTION INTRAVENOUS at 15:23

## 2017-07-18 RX ADMIN — INSULIN LISPRO 4 UNITS: 100 INJECTION, SOLUTION INTRAVENOUS; SUBCUTANEOUS at 22:10

## 2017-07-18 RX ADMIN — METHYLPREDNISOLONE SODIUM SUCCINATE 125 MG: 125 INJECTION, POWDER, FOR SOLUTION INTRAMUSCULAR; INTRAVENOUS at 13:28

## 2017-07-18 NOTE — ED TRIAGE NOTES
Triage Note: Patient is coming in from MD office with progressing chest pain for the past 3 days. + SOB, + nausea. Diaphoretic in triage. Patient taken immediately to the room.

## 2017-07-18 NOTE — PROGRESS NOTES
TRANSFER - IN REPORT:    Verbal report received from Serge RN (name) on Oscar Tavarez  being received from ED(unit) for routine progression of care      Report consisted of patients Situation, Background, Assessment and   Recommendations(SBAR). Information from the following report(s) SBAR was reviewed with the receiving nurse. Opportunity for questions and clarification was provided. Assessment completed upon patients arrival to unit and care assumed.

## 2017-07-18 NOTE — TELEPHONE ENCOUNTER
I called and spoke with son, pt was taken by our office to ER in wheelchair for chest pains and diaphoretic, son, Brody Frederick was notified

## 2017-07-18 NOTE — PROCEDURES
[facility]  *** FINAL REPORT ***    Name: Johann Isaacs  MRN: [id number]    Outpatient  : 1960  HIS Order #: [id number]  [id number] [facility] Visit #: [id number]  Date: 2017    TYPE OF TEST: Peripheral Venous Testing    REASON FOR TEST  R/O DVT, patient with history of blood clots    Right Leg:-  Deep venous thrombosis:           No  Superficial venous thrombosis:    No  Deep venous insufficiency:        Not examined  Superficial venous insufficiency: Not examined    Left Leg:-  Deep venous thrombosis:           No  Superficial venous thrombosis:    No  Deep venous insufficiency:        Not examined  Superficial venous insufficiency: Not examined      INTERPRETATION/FINDINGS  PROCEDURE:  Color duplex ultrasound imaging of lower extremity veins. FINDINGS:       Right: The common femoral, deep femoral, femoral, popliteal,  posterior tibial, peroneal, and great saphenous are patent and without   evidence of thrombus;  each is fully compressible and there is no  narrowing of the flow channel on color Doppler imaging. Continious  flow is observed in the common femoral vein. Left:   The common femoral, deep femoral, femoral, popliteal,  posterior tibial, peroneal, and great saphenous are patent and without   evidence of thrombus;  each is fully compressible and there is no  narrowing of the flow channel on color Doppler imaging. Continious  flow is observed in the common femoral vein. IMPRESSION:  No evidence of right or left lower extremity vein  thrombosis. Continious venous flow is observed bilaterally. ADDITIONAL COMMENTS    I have personally reviewed the data relevant to the interpretation of  this  study.     TECHNOLOGIST: Juan Miguel Mehta RVT  Signed: 2017 06:32 PM    PHYSICIAN: Yenni Perez MD  Signed: 2017 09:23 AM

## 2017-07-18 NOTE — H&P
Lou Glover. Wall, NP       Richard Garcia       [id number]  1960    Admitted 2017 Date 2017     HPI:  Ms. Tawana Vaughn is a long time pt of Dr. Blanquita Nazario. She was initially dx with ovarian cancer in 2012. She had MARAH/BSO/Cytoreductive surgery on 12. At that time she was Stage III C. She recovered and began 6 cycles of Carbo/Taxol. Had good response, but reoccurred and in  she received 3 cycles of Carbo/Taxol before having a reaction to Carbo and this was stopped. She continued her next 3 cycles with Doxil/Avastin. This did not decrease disease. .   She had a treatment break from 3/2015 until she restarted with Topatecan for 11 cycles from 2015 through 2016. She was followed by Dr. Blanquita Nazario and in 2017, she had a CT scan that showed recurrence of disease (see below). She was started on Gemzar on 17 and has received 7 cycles with last on being 2017. Her next cycle had to be held due to requiring a tooh extraction. She has now recovered from this and presents for pre-chemo visit, labs and proceeding with treatment     Pt says she has been able to eat and drink better since her teeth have been fixed. She is happy it is all behind her. She is anxious to resume her chemotherapy as she felt it was \"working\"     Diagnosis: Ovarian cancer  Original Surgery: 12 Ex-lap, ROULA/BSO, radical tumor debulking  Pathology: High grade serious ovarian cancer with omental cake and carcinomatosis. Stage IIIC      SUBJECTIVE:  Pt was seen in the office this morning and began to complain of chest pressure and was taken to the ER. She ruled out for an MI and it was felt to be muscle spasms in her upper back. She had a CT of her chest rto R/O a PE and was noted to have persistent mediastinal lymphadenopathy. She did have an elevated D-Dimer.  Troponin negative so far   Currently, she is resting on stretcher with out complaint. Has recently had a breathing treatment. ER physician said she had some wheezing earlier. None presently and none noted in the office earlier. Patient Active Problem List    Diagnosis Date Noted    UTI (urinary tract infection) 04/03/2017    Pulmonary hypertension, mild ([address] 03/28/2017    ARF (acute renal failure) ([address] 03/02/2017    Renal calculi 03/02/2017    Elevated BUN 03/02/2017    Hyperglycemia due to type 2 diabetes mellitus ([address] 02/15/2017    Preop cardiovascular exam 02/15/2017    Angina pectoris associated with type 2 diabetes mellitus ([address] 02/15/2017    Ovarian cancer ([address] 04/28/2015    Elevated CA-125 02/23/2015    Swelling of eye, bilateral--left > right 01/18/2015    Portacath in place--left 01/18/2015    Chest pain, atypical 01/18/2015    Hx of pulmonary embolus during pregnancy 01/18/2015    CINV (chemotherapy-induced nausea and vomiting) 08/21/2014    Benign essential hypertension 04/04/2014    Disorder of thyroid 04/04/2014    Dyslipidemia (high LDL; low HDL)     Sleep apnea 02/28/2014    Abnormal mammogram 01/15/2014    Histor of ovarian cancer 09/22/2012    Carcinomatosis ([address] 09/22/2012    Controlled type 2 diabetes mellitus without complication, with long-term current use of insulin ([address] 09/22/2012    Morbid obesity ([address] 09/22/2012     Past Medical History:   Diagnosis Date    Adverse effect of anesthesia     sleep apnea cpap machine useage     Anemia     Arthritis     DDD low back and knees    Asthma     uses albuterol prn only; none x 6 mos.   Never hospitalized    BRCA negative 1/2013    Calculus of kidney     Chronic kidney disease     kidney stones    Chronic pain     knee pain bilat    CINV (chemotherapy-induced nausea and vomiting) 8/21/2014    Diabetes ([address] Age 46    IDDM    Environmental allergies     Fibromyalgia     GERD (gastroesophageal reflux disease)     controlled with med    Hypertension  Ill-defined condition     Sepsis     Ill-defined condition 2016    chemotherapy     [facility]     Other and unspecified hyperlipidemia     Ovarian cancer ([facility] 2012, 2014    serous, high grade, stage IIIc. s/p chemotx (has port)    Psychiatric disorder     Rectal bleeding     Thromboembolus ([facility]     POST PARTUM; resolved- PELVIS    Thyroid disease     HYPOTHYROID    Unspecified sleep apnea     CPAP, Dr. Halley Villeda      Past Surgical History:   Procedure Laterality Date    BREAST SURGERY PROCEDURE UNLISTED      Lymph node in left breast 2017    CHEST SURGERY PROCEDURE UNLISTED      Placement of port a cath right chest    HX GYN       X2    HX HYSTERECTOMY  2012    ROULA/BSO, tumor debulking, omentectomy for ovarian cancer    HX ORTHOPAEDIC      ankle-FX, R    HX ORTHOPAEDIC      FX R ARM    HX UROLOGICAL      stent    HX VASCULAR ACCESS  2015    right chest- port placed      Social History   Substance Use Topics    Smoking status: Never Smoker    Smokeless tobacco: Never Used    Alcohol use No      Family History   Problem Relation Age of Onset    Hypertension Mother     Arthritis-osteo Mother     Lung Disease Father      COPD    Hypertension Father     Arthritis-osteo Father     Hypertension Brother     Elevated Lipids Brother     Arthritis-osteo Brother     Hypertension Brother     Elevated Lipids Brother     Obesity Brother     Cancer Brother      PROSTATE    Anesth Problems Son      DELAYED AWAKENING    Diabetes Maternal Grandmother       No current facility-administered medications for this encounter. Current Outpatient Prescriptions   Medication Sig    clonazePAM (KLONOPIN) 0.5 mg tablet Take 0.5 mg by mouth nightly as needed.  nystatin (MYCOSTATIN) powder Apply  to affected area four (4) times daily as needed.  OTHER,NON-FORMULARY, 2 Tabs daily (after dinner).  [facility] (vitamin)    potassium chloride SR (KLOR-CON 10) 10 mEq tablet Take 1 Tab by mouth two (2) days a week. (Patient taking differently: Take 10 mEq by mouth two (2) days a week. Monday and wednesdays)    fluticasone (FLONASE) 50 mcg/actuation nasal spray     insulin aspart (NOVOLOG FLEXPEN) 100 unit/mL inpn INJECT 20 UNITS BEFORE EACH MEAL + 4 UNITS FOR EVERY 50 MG/DL ABOVE 150 MG/DL.  UNITS PER DAY    SYMBICORT 160-4.5 mcg/actuation HFA inhaler 2 Puffs two (2) times daily as needed.  omega-3 acid ethyl esters (LOVAZA) 1 gram capsule TAKE ONE CAPSULE BY MOUTH TWICE A DAY    Liraglutide (VICTOZA) 0.6 mg/0.1 mL (18 mg/3 mL) sub-q pen 0.3 mL by SubCUTAneous route daily (with lunch).  cycloSPORINE (RESTASIS) 0.05 % ophthalmic emulsion 1 Drop two (2) times a day.  multivitamin (ONE A DAY) tablet Take 2 Tabs by mouth daily (after dinner).  sertraline (ZOLOFT) 50 mg tablet TAKE 1 TABLET BY MOUTH EVERY MORNING    methocarbamol (ROBAXIN) 500 mg tablet Take 500 mg by mouth three (3) times daily as needed.  ondansetron (ZOFRAN ODT) 8 mg disintegrating tablet Take 1 Tab by mouth every eight (8) hours as needed for Nausea.  valsartan (DIOVAN) 160 mg tablet TAKE 1 TAB BY MOUTH DAILY. REPLACES VALSARTAN-HCTZ    LEVEMIR FLEXTOUCH 100 unit/mL (3 mL) inpn 30 UNITS IN THE MORNING AND 50 UNITS AT NIGHT. INCREASE AS DIRECTED TO MAX  UNITS PER DAY    montelukast (SINGULAIR) 10 mg tablet Take 1 Tab by mouth daily.  levothyroxine (SYNTHROID) 150 mcg tablet TAKE 1 TAB BY MOUTH DAILY (BEFORE BREAKFAST).  fluticasone-salmeterol (ADVAIR HFA) 230-21 mcg/actuation inhaler Take 2 Puffs by inhalation two (2) times a day.  albuterol (PROVENTIL VENTOLIN) 2.5 mg /3 mL (0.083 %) nebulizer solution 3 mL by Nebulization route every four (4) hours as needed for Wheezing.  cholecalciferol, VITAMIN D3, (VITAMIN D3) 5,000 unit tab tablet Take 5,000 Units by mouth daily.  furosemide (LASIX) 40 mg tablet Take 80 mg by mouth as needed.     albuterol (PROVENTIL HFA, VENTOLIN HFA, PROAIR HFA) 90 mcg/actuation inhaler Take 2 Puffs by inhalation every four (4) hours as needed for Wheezing.  fexofenadine (ALLEGRA) 180 mg tablet Take 180 mg by mouth daily. Allergies   Allergen Reactions    Carboplatin Other (comments)     Patient developed shortness of breath, felt faint, coughing, skin flushed and \"itchy\". This occurred on the patients 8th treatment.  Iodinated Contrast- Oral And Iv Dye Rash    Lipitor [Atorvastatin] Myalgia    Percocet [Oxycodone-Acetaminophen] Other (comments)     fever    Shellfish Containing Products Hives    Tape [Adhesive] Itching and Other (comments)     \"op site-- clear,thin tape\"--caused blister         Review of Systems:  General: Says she is her happy teeth are fixed. Acknowledges some increase in fatigue lately  HEENT: Denies visual changes, dysphagia or headache  Resp: Acknowledges some ECKERT that she feels is related to fatigue. Denies, wheezing or cough  CV: Says she began this past weekend with some \"back/shoulder blade sharp pain that went up to her shoulder and down her left arm off and on this weekend\". When questioned further, said it began again this morning and made her \"not feel good\". She says she knows she should have gone to the ER, but just didn't as she thought it would pass. GI/: Denies N/V, diarrhea, constipation or dysuria (says her \"green drinks\" keep her regular)  MuskSkel:Continues with DJD knee pain. She said she was told she needed a TKR, but has not gotten it yet. Continues to take tylenol for pain when needed and rest leg  Neuro: Denies dizzyness or syncope    OBJECTIVE:  Visit Vitals    /69    Pulse 90    Temp 98.4 °F (36.9 °C)    Resp 23    Ht 5' 8.5\" (1.74 m)    Wt 317 lb 7.4 oz (144 kg)    LMP 09/07/2011    SpO2 94%    BMI 47.57 kg/m2       Physical Exam:  General: A&O X3. Currently says CP has resolved  HEENT: Sclera anicteric, pupils equal and reactive to light. Mucosa pink, moist   Neck: No JVD. No cervical adenopathy appreciated. Heart: Regular with persistent 2-3/6 murmur  Lungs: CTA Bilat without wheezing or rales   Abd: Soft, obese, NT/ND with + BS throughout  Ext: Without edema and + pedal pulses bilat  Neuro: grossly intact    Imaging:  CTA 7/18/2017  FINDINGS: This is a good quality study for the evaluation of pulmonary embolism  to the first subsegmental arterial level. There is no pulmonary embolism to this  level.      The right chest Port-A-Cath terminates in the SVC. Aorta Cardiac size is within  normal limits. No pericardial effusion.      There is mediastinal lymphadenopathy with a representative prevascular lymph  node measuring 2.5 x 3.0 cm (series 3, image 84), previously 2.6 x 2.9 cm. Soft  tissue mass in the anterior right chest wall measures 2.7 x 3.9 cm (series 3,  image 77), previously 2.7 x 3.7 cm. A representative right pericardial lymph  node measures 2.2 x 2.3 cm (series 3, image 44), previously 1.9 x 2.5 cm.     There are mild patchy bilateral groundglass opacities. There is no lung mass or  consolidation. No pleural effusion or pneumothorax. Central airways are  unremarkable.     Upper abdominal lymphadenopathy is grossly unchanged. There is no aggressive  blastic or lytic bony lesion.     IMPRESSION  IMPRESSION:   1. There is no acute pulmonary embolism.     2. Mediastinal lymphadenopathy, upper abdominal lymphadenopathy, and chest wall  soft tissue metastatic disease is overall unchanged.     3. Mild patchy bilateral groundglass opacities in the lungs may represent  pulmonary edema or nonspecific infection or inflammation. Attention on follow-up  is recommended.     Data Review      Recent Results (from the past 24 hour(s))   CBC WITH AUTOMATED DIFF    Collection Time: 07/18/17 11:34 AM   Result Value Ref Range    WBC 9.6 3.6 - 11.0 K/uL    RBC 3.45 (L) 3.80 - 5.20 M/uL    HGB 9.9 (L) 11.5 - 16.0 g/dL    HCT 31.7 (L) 35.0 - 47.0 %    MCV 91.9 80.0 - 99.0 FL    MCH 28.7 26.0 - 34.0 PG    MCHC 31.2 30.0 - 36.5 g/dL    RDW 13.8 11.5 - 14.5 %    PLATELET 137 163 - 144 K/uL    NEUTROPHILS 64 32 - 75 %    LYMPHOCYTES 24 12 - 49 %    MONOCYTES 10 5 - 13 %    EOSINOPHILS 2 0 - 7 %    BASOPHILS 0 0 - 1 %    ABS. NEUTROPHILS 6.1 1.8 - 8.0 K/UL    ABS. LYMPHOCYTES 2.3 0.8 - 3.5 K/UL    ABS. MONOCYTES 0.9 0.0 - 1.0 K/UL    ABS. EOSINOPHILS 0.2 0.0 - 0.4 K/UL    ABS. BASOPHILS 0.0 0.0 - 0.1 K/UL   METABOLIC PANEL, COMPREHENSIVE    Collection Time: 07/18/17 11:34 AM   Result Value Ref Range    Sodium 139 136 - 145 mmol/L    Potassium 3.8 3.5 - 5.1 mmol/L    Chloride 108 97 - 108 mmol/L    CO2 21 21 - 32 mmol/L    Anion gap 10 5 - 15 mmol/L    Glucose 126 (H) 65 - 100 mg/dL    BUN 27 (H) 6 - 20 MG/DL    Creatinine 1.48 (H) 0.55 - 1.02 MG/DL    BUN/Creatinine ratio 18 12 - 20      GFR est AA 44 (L) >60 ml/min/1.73m2    GFR est non-AA 37 (L) >60 ml/min/1.73m2    Calcium 9.0 8.5 - 10.1 MG/DL    Bilirubin, total 0.3 0.2 - 1.0 MG/DL    ALT (SGPT) 26 12 - 78 U/L    AST (SGOT) 34 15 - 37 U/L    Alk.  phosphatase 102 45 - 117 U/L    Protein, total 9.0 (H) 6.4 - 8.2 g/dL    Albumin 3.3 (L) 3.5 - 5.0 g/dL    Globulin 5.7 (H) 2.0 - 4.0 g/dL    A-G Ratio 0.6 (L) 1.1 - 2.2     TROPONIN I    Collection Time: 07/18/17 11:34 AM   Result Value Ref Range    Troponin-I, Qt. <0.04 <0.05 ng/mL   D DIMER    Collection Time: 07/18/17 11:34 AM   Result Value Ref Range    D-dimer 17.53 (H) 0.00 - 0.65 mg/L FEU   BNP    Collection Time: 07/18/17 11:34 AM   Result Value Ref Range    BNP 77 0 - 100 pg/mL   EKG, 12 LEAD, INITIAL    Collection Time: 07/18/17 11:34 AM   Result Value Ref Range    Ventricular Rate 79 BPM    Atrial Rate 79 BPM    P-R Interval 158 ms    QRS Duration 80 ms    Q-T Interval 360 ms    QTC Calculation (Bezet) 412 ms    Calculated P Axis 63 degrees    Calculated R Axis -7 degrees    Calculated T Axis 10 degrees    Diagnosis       Normal sinus rhythm  When compared with ECG of 03-APR-2017 10:49,  premature atrial complexes are no longer present  Confirmed by Kashmir Taylor MD, Chantale Pineda ([id number]) on 7/18/2017 11:59:38 AM           IMPRESSION:    Patient Active Problem List   Diagnosis Code    Histor of ovarian cancer Z85.43    Carcinomatosis ([facility] C80.0    Controlled type 2 diabetes mellitus without complication, with long-term current use of insulin (HCC) E11.9, Z79.4    Morbid obesity ([facility] E66.01    Abnormal mammogram R92.8    Sleep apnea G47.30    Dyslipidemia (high LDL; low HDL) E78.4    Benign essential hypertension I10    Disorder of thyroid E07.9    CINV (chemotherapy-induced nausea and vomiting) R11.2, T45.1X5A    Swelling of eye, bilateral--left > right H57.8    Portacath in place--left Z95.828    Chest pain, atypical R07.89    Hx of pulmonary embolus during pregnancy Z86.711, Z87.59    Elevated CA-125 R97.1    Ovarian cancer (HCC) C56.9    Hyperglycemia due to type 2 diabetes mellitus ([facility] E11.65    Preop cardiovascular exam Z01.810    Angina pectoris associated with type 2 diabetes mellitus (HCC) E11.59, I20.9    ARF (acute renal failure) (HCC) N17.9    Renal calculi N20.0    Elevated BUN R79.9    Pulmonary hypertension, mild (HCC) I27.2    UTI (urinary tract infection) N39.0       PLAN:  Dr. Carmen De Guzman from the ER called and spoke with Dr. Cande Mclaughlin and recommended pt be admitted for observation over night  We will admit for observation and further monitoring overnight. Gently hydrate for elevated BUN/Creat  Continue nebs   Check D-dimer/troponin/EKG in AM along with other labs. Correction coverage. Continue home meds  Check TSH and continue synthroid  Check lower ext dopplers   D[facility]ed diabetic [facility] with [facility] and agreed upon Lantus 30 units QAM since pt stated she has had some low blood sugars. Her son will bring in her Victoza for her to take in the morning.    Signed By: Radha Chiu NP     7/18/2017/4:19 PM

## 2017-07-18 NOTE — ED TRIAGE NOTES
Pt states feeling a throbbing chest pain that radiates to left shoulder and back.  Worsening this morningl

## 2017-07-18 NOTE — ED PROVIDER NOTES
HPI Comments: 55 y.o. female with past medical history significant for HTN, GERD, morbid obesity, fibromyalgia, CINV, DM, thyroid disease, CKD, ovarian cancer, hysterectomy, , hysterectomy, and urological stent who presents from MD office with chief complaint of CP. Pt c/o worsening CP that radiates to her jaw for the past 3 days with slowly progressing SOB that began this morning making it painful to breathe. Pt was at her GYN office today for an appointment when she became diaphoretic and complained of worsening CP. Pt also claims she has pain to the posterior R calf, and has hx of VTE with childbirth. Pt states she feels nauseated, and had new onset diarrhea last night. Pt claims she's lost 20lbs in the past 7 months, and had dental work performed one month ago. Pt's most recent creatinine was 1.6. Pt is followed by Dr. Ana Rod, Cardiology for a heart murmur, but denies any hx of blockages. Pt denies having any cough. There are no other acute medical concerns at this time. Fmhx: heart disease  PCP: Tra Jones MD    Note written by Lamar Gutierrez, as dictated by Bao Sánchez MD 12:04 PM      The history is provided by the patient. No  was used. Past Medical History:   Diagnosis Date    Adverse effect of anesthesia     sleep apnea cpap machine useage     Anemia     Arthritis     DDD low back and knees    Asthma     uses albuterol prn only; none x 6 mos.   Never hospitalized    BRCA negative 2013    Calculus of kidney     Chronic kidney disease     kidney stones    Chronic pain     knee pain bilat    CINV (chemotherapy-induced nausea and vomiting) 2014    Diabetes ([address] Age 46    IDDM    Environmental allergies     Fibromyalgia     GERD (gastroesophageal reflux disease)     controlled with med    Hypertension     Ill-defined condition     Sepsis     Ill-defined condition 2016    chemotherapy     Morbid obesity ([address]     Other and unspecified hyperlipidemia     Ovarian cancer ([facility] 2012, 2014    serous, high grade, stage IIIc. s/p chemotx (has port)    Psychiatric disorder     Rectal bleeding     Thromboembolus ([facility]     POST PARTUM; resolved- PELVIS    Thyroid disease     HYPOTHYROID    Unspecified sleep apnea     CPAP, Dr. Jenise Monroy       Past Surgical History:   Procedure Laterality Date    BREAST SURGERY PROCEDURE UNLISTED      Lymph node in left breast 2017    CHEST SURGERY PROCEDURE UNLISTED      Placement of port a cath right chest    HX GYN       X2    HX HYSTERECTOMY  2012    ROULA/BSO, tumor debulking, omentectomy for ovarian cancer    HX ORTHOPAEDIC      ankle-FX, R    HX ORTHOPAEDIC      FX R ARM    HX UROLOGICAL      stent    HX VASCULAR ACCESS  2015    right chest- port placed         Family History:   Problem Relation Age of Onset    Hypertension Mother     Arthritis-osteo Mother     Lung Disease Father      COPD    Hypertension Father     Arthritis-osteo Father     Hypertension Brother     Elevated Lipids Brother     Arthritis-osteo Brother     Hypertension Brother     Elevated Lipids Brother     Obesity Brother     Cancer Brother      PROSTATE    Anesth Problems Son      DELAYED AWAKENING    Diabetes Maternal Grandmother        Social History     Social History    Marital status:      Spouse name: N/A    Number of children: N/A    Years of education: N/A     Occupational History    Not on file. Social History Main Topics    Smoking status: Never Smoker    Smokeless tobacco: Never Used    Alcohol use No    Drug use: Yes     Special: Prescription, OTC    Sexual activity: Yes     Other Topics Concern    Not on file     Social History Narrative    Lives in [facility] alone.  passed away in  of a heart attack. Has 2 sons. Disability. 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 Enjoys swimming and going to the gym.          ALLERGIES: Carboplatin; Iodinated contrast- oral and iv dye; Lipitor [atorvastatin]; Percocet [oxycodone-acetaminophen]; Shellfish containing products; and Tape [adhesive]    Review of Systems   Constitutional: Positive for diaphoresis and unexpected weight change (lost 20 lbs in past 7 months). Negative for appetite change, chills and fever. HENT: Negative for congestion. Eyes: Negative for visual disturbance. Respiratory: Positive for shortness of breath. Negative for cough, chest tightness and wheezing. Cardiovascular: Positive for chest pain. Gastrointestinal: Positive for diarrhea and nausea. Negative for abdominal pain and vomiting. Genitourinary: Negative for dysuria and frequency. Musculoskeletal: Negative for joint swelling. Pain in posterior RLE   Skin: Negative for rash. Neurological: Negative for speech difficulty and headaches. All other systems reviewed and are negative. Vitals:    07/18/17 1120 07/18/17 1135   BP: 145/89 148/74   Pulse: 84    Resp: 22 24   Temp: 98 °F (36.7 °C)    SpO2: 97% 95%   Weight: 144 kg (317 lb 7.4 oz)    Height: 5' 8.5\" (1.74 m)             Physical Exam   Constitutional: She is oriented to person, place, and time. She appears well-developed and well-nourished. No distress. HENT:   Head: Normocephalic and atraumatic. Nose: Nose normal.   Eyes: Conjunctivae are normal. Pupils are equal, round, and reactive to light. No scleral icterus. Neck: Normal range of motion. Neck supple. No JVD present. No tracheal deviation present. No thyromegaly present. Cardiovascular: Normal rate, regular rhythm and normal heart sounds. No murmur heard. Pulmonary/Chest: Effort normal and breath sounds normal. No respiratory distress. She has no wheezes. She has no rales. Pleuritic chest pain   Abdominal: Soft. Bowel sounds are normal. She exhibits no mass. There is no tenderness. There is no rebound and no guarding. Musculoskeletal: Normal range of motion.  She exhibits tenderness (posterior R calf). She exhibits no edema. Neurological: She is alert and oriented to person, place, and time. No cranial nerve deficit. Coordination normal.   Skin: Skin is warm and dry. No rash noted. She is not diaphoretic. No erythema. Psychiatric: She has a normal mood and affect. Her behavior is normal.   Nursing note and vitals reviewed. Note written by Lamar Wynne, as dictated by Lor Delgado MD 12:06 PM       [facility]  ED Course       Procedures    ED EKG interpretation:  Rhythm: normal sinus rhythm; and regular . Rate (approx.): 79. Note written by Lamar Wynne, as dictated by Lor Delgado MD 12:44 PM    PROGRESS NOTE:  1:13 PM  Pt has an iodine allergy and will be pre-medicated prior to sending to CT       CONSULT NOTE:  4:17 PM Lor Delgado MD spoke with Severiano Gallegos MD Consult for Oncology. Discussed available diagnostic tests and clinical findings. She is in agreement with care plans as outlined. He will admit the pt.

## 2017-07-18 NOTE — PROGRESS NOTES
Follow up appointment, clearance to restart chemo treatments, pt complains of lower abdominal pain, and burning with urination X 1 week, pt also complains of dizziness that is off and on, she states she is \"worried about being dehydrated\"

## 2017-07-18 NOTE — ED NOTES
Status check: pt states feeling drowsy post benadryl and solu medrol administration. Pt stable and resting at this moment.

## 2017-07-18 NOTE — IP AVS SNAPSHOT
2708 [facility] [address] [place] [address] 
[phone] Patient: Maxi Brar MRN: [id number] :1960 You are allergic to the following Allergen Reactions Carboplatin Other (comments) Patient developed shortness of breath, felt faint, coughing, skin flushed and \"itchy\". This occurred on the patients 8th treatment. Iodinated Contrast- Oral And Iv Dye Rash Lipitor (Atorvastatin) Myalgia Percocet (Oxycodone-Acetaminophen) Other (comments) fever Shellfish Containing Products Hives Tape (Adhesive) Itching Other (comments) \"op site-- clear,thin tape\"--caused blister Recent Documentation Height Weight Breastfeeding? BMI OB Status Smoking Status 1.74 m 144 kg No 47.57 kg/m2 Hysterectomy Never Smoker Unresulted Labs Order Current Status SAMPLE TO BLOOD BANK In process Emergency Contacts Name Discharge Info Relation Home Work Mobile [address] [facility] Winfred CAREGIVER [3] Son [22] [phone] Fay Babin'  Child [2] [phone] About your hospitalization You were admitted on:  2017 You last received care in the:  [facility] [address] SPECIAL CR You were discharged on:  2017 Unit phone number:  [phone] Why you were hospitalized Your primary diagnosis was:  Not on File Your diagnoses also included:  Ovarian Cancer (Hcc), Wheezing, Lymphadenopathy, Mediastinal, Portacath In Place, Pulmonary Hypertension, Mild (Hcc), Morbid Obesity (Hcc), Hx Of Pulmonary Embolus During Pregnancy, Elevated Ca-125, Elevated Bun, Controlled Type 2 Diabetes Mellitus Without Complication, With Long-Term Current Use Of Insulin (Hcc), Carcinomatosis (Hcc), Arf (Acute Renal Failure) (Hcc), Benign Essential Hypertension, Dyslipidemia (High Ldl; Low Hdl), Disorder Of Thyroid Providers Seen During Your Hospitalizations Provider Role Specialty Primary office phone Kenneth Wise MD Attending Provider Emergency Medicine [phone] Madison Georges MD Attending Provider Gynecologic Oncology [phone] Your Primary Care Physician (PCP) Primary Care Physician Office Phone Office Fax Eddi Lr [phone] Follow-up Information Follow up With Details Comments Contact Info Gino Matute MD   [address] 
[phone] Current Discharge Medication List  
  
CONTINUE these medications which have CHANGED Dose & Instructions Dispensing Information Comments Morning Noon Evening Bedtime  
 potassium chloride SR 10 mEq tablet Commonly known as:  KLOR-CON 10 What changed:  additional instructions Your last dose was: Your next dose is:    
   
   
 Dose:  10 mEq Take 1 Tab by mouth two (2) days a week. Quantity:  30 Tab Refills:  1 CONTINUE these medications which have NOT CHANGED Dose & Instructions Dispensing Information Comments Morning Noon Evening Bedtime * albuterol 90 mcg/actuation inhaler Commonly known as:  PROVENTIL HFA, VENTOLIN HFA, PROAIR HFA Your last dose was: Your next dose is:    
   
   
 Dose:  2 Puff Take 2 Puffs by inhalation every four (4) hours as needed for Wheezing. Refills:  0  
     
   
   
   
  
 * albuterol 2.5 mg /3 mL (0.083 %) nebulizer solution Commonly known as:  PROVENTIL VENTOLIN Your last dose was: Your next dose is:    
   
   
 Dose:  2.5 mg  
3 mL by Nebulization route every four (4) hours as needed for Wheezing. Quantity:  24 Each Refills:  11 ALLEGRA 180 mg tablet Generic drug:  fexofenadine Your last dose was: Your next dose is:    
   
   
 Dose:  180 mg Take 180 mg by mouth daily. Refills:  0 cholecalciferol (VITAMIN D3) 5,000 unit Tab tablet Commonly known as:  VITAMIN D3 Your last dose was: Your next dose is:    
   
   
 Dose:  5000 Units Take 5,000 Units by mouth daily. Refills:  0  
     
   
   
   
  
 clonazePAM 0.5 mg tablet Commonly known as:  Armand Peralta Your last dose was: Your next dose is:    
   
   
 Dose:  0.5 mg Take 0.5 mg by mouth nightly as needed. Refills:  0  
     
   
   
   
  
 fluticasone 50 mcg/actuation nasal spray Commonly known as:  Shahzad Avery Your last dose was: Your next dose is:    
   
   
  Refills:  0  
     
   
   
   
  
 fluticasone-salmeterol 230-21 mcg/actuation inhaler Commonly known as:  ADVAIR HFA Your last dose was: Your next dose is:    
   
   
 Dose:  2 Puff Take 2 Puffs by inhalation two (2) times a day. Quantity:  12 g Refills:  5  
     
   
   
   
  
 furosemide 40 mg tablet Commonly known as:  LASIX Your last dose was: Your next dose is:    
   
   
 Dose:  80 mg Take 80 mg by mouth as needed. Refills:  3 HYDROcodone-acetaminophen 5-325 mg per tablet Commonly known as:  Ford Wade Your last dose was: Your next dose is:    
   
   
 Dose:  1 Tab Take 1 Tab by mouth every four (4) hours as needed for Pain. Max Daily Amount: 6 Tabs. Quantity:  30 Tab Refills:  0  
     
   
   
   
  
 insulin aspart 100 unit/mL Inpn Commonly known as:  Carmen Nesbitt Your last dose was: Your next dose is: INJECT 20 UNITS BEFORE EACH MEAL + 4 UNITS FOR EVERY 50 MG/DL ABOVE 150 MG/DL.  UNITS PER DAY Quantity:  30 mL Refills:  11 LEVEMIR FLEXTOUCH 100 unit/mL (3 mL) Inpn Generic drug:  insulin detemir Your last dose was: Your next dose is:    
   
   
 30 UNITS IN THE MORNING AND 50 UNITS AT NIGHT.  INCREASE AS DIRECTED TO MAX  UNITS PER DAY  
 Quantity:  30 mL Refills:  11  
     
   
   
   
  
 levothyroxine 150 mcg tablet Commonly known as:  SYNTHROID Your last dose was: Your next dose is: TAKE 1 TAB BY MOUTH DAILY (BEFORE BREAKFAST). Quantity:  90 Tab Refills:  3 Liraglutide 0.6 mg/0.1 mL (18 mg/3 mL) sub-q pen Commonly known as:  Cornelio Cornejo Your last dose was: Your next dose is:    
   
   
 Dose:  1.8 mg  
0.3 mL by SubCUTAneous route daily (with lunch). Quantity:  3 Each Refills:  11  
     
   
   
   
  
 methocarbamol 500 mg tablet Commonly known as:  ROBAXIN Your last dose was: Your next dose is:    
   
   
 Dose:  500 mg Take 500 mg by mouth three (3) times daily as needed. Refills:  0  
     
   
   
   
  
 montelukast 10 mg tablet Commonly known as:  SINGULAIR Your last dose was: Your next dose is:    
   
   
 Dose:  10 mg Take 1 Tab by mouth daily. Quantity:  90 Tab Refills:  2  
     
   
   
   
  
 multivitamin tablet Commonly known as:  ONE A DAY Your last dose was: Your next dose is:    
   
   
 Dose:  2 Tab Take 2 Tabs by mouth daily (after dinner). Refills:  0  
     
   
   
   
  
 nystatin powder Commonly known as:  MYCOSTATIN Your last dose was: Your next dose is:    
   
   
 Apply  to affected area four (4) times daily as needed. Refills:  0  
     
   
   
   
  
 omega-3 acid ethyl esters 1 gram capsule Commonly known as:  Evetta Bal Your last dose was: Your next dose is: TAKE ONE CAPSULE BY MOUTH TWICE A DAY Refills:  3  
     
   
   
   
  
 ondansetron 8 mg disintegrating tablet Commonly known as:  ZOFRAN ODT Your last dose was: Your next dose is:    
   
   
 Dose:  8 mg Take 1 Tab by mouth every eight (8) hours as needed for Nausea. Quantity:  30 Tab Refills:  1 OTHER(NON-FORMULARY) Your last dose was: Your next dose is:    
   
   
 Dose:  2 Tab  
2 Tabs daily (after dinner). Eye Promise (vitamin) Refills:  0  
     
   
   
   
  
 RESTASIS 0.05 % ophthalmic emulsion Generic drug:  cycloSPORINE Your last dose was: Your next dose is:    
   
   
 Dose:  1 Drop 1 Drop two (2) times a day. Refills:  0  
     
   
   
   
  
 sertraline 50 mg tablet Commonly known as:  ZOLOFT Your last dose was: Your next dose is: TAKE 1 TABLET BY MOUTH EVERY MORNING Refills:  1 SYMBICORT 160-4.5 mcg/actuation HFA inhaler Generic drug:  budesonide-formoterol Your last dose was: Your next dose is:    
   
   
 Dose:  2 Puff 2 Puffs two (2) times daily as needed. Refills:  0  
     
   
   
   
  
 valsartan 160 mg tablet Commonly known as:  DIOVAN Your last dose was: Your next dose is: TAKE 1 TAB BY MOUTH DAILY. REPLACES VALSARTAN-HCTZ Quantity:  30 Tab Refills:  11 * Notice: This list has 2 medication(s) that are the same as other medications prescribed for you. Read the directions carefully, and ask your doctor or other care provider to review them with you. Where to Get Your Medications Information on where to get these meds will be given to you by the nurse or doctor. ! Ask your nurse or doctor about these medications HYDROcodone-acetaminophen 5-325 mg per tablet [facility] Jass Horvath MD Trecia Matt, NP Patient Discharge Instructions Nelta Thomas / [id number] : 1960 Admitted 2017 Discharged: 2017 Take Home Medications No current facility-administered medications on file prior to encounter. Current Outpatient Prescriptions on File Prior to Encounter Medication Sig Dispense Refill  potassium chloride SR (KLOR-CON 10) 10 mEq tablet Take 1 Tab by mouth two (2) days a week. (Patient taking differently: Take 10 mEq by mouth two (2) days a week. Monday and wednesdays) 30 Tab 1  
 fluticasone (FLONASE) 50 mcg/actuation nasal spray  insulin aspart (NOVOLOG FLEXPEN) 100 unit/mL inpn INJECT 20 UNITS BEFORE EACH MEAL + 4 UNITS FOR EVERY 50 MG/DL ABOVE 150 MG/DL.  UNITS PER DAY 30 mL 11  
 SYMBICORT 160-4.5 mcg/actuation HFA inhaler 2 Puffs two (2) times daily as needed.  omega-3 acid ethyl esters (LOVAZA) 1 gram capsule TAKE ONE CAPSULE BY MOUTH TWICE A DAY  3  
 Liraglutide (VICTOZA) 0.6 mg/0.1 mL (18 mg/3 mL) sub-q pen 0.3 mL by SubCUTAneous route daily (with lunch). 3 Each 11  
 cycloSPORINE (RESTASIS) 0.05 % ophthalmic emulsion 1 Drop two (2) times a day.  multivitamin (ONE A DAY) tablet Take 2 Tabs by mouth daily (after dinner).  sertraline (ZOLOFT) 50 mg tablet TAKE 1 TABLET BY MOUTH EVERY MORNING  1  
 methocarbamol (ROBAXIN) 500 mg tablet Take 500 mg by mouth three (3) times daily as needed.  ondansetron (ZOFRAN ODT) 8 mg disintegrating tablet Take 1 Tab by mouth every eight (8) hours as needed for Nausea. 30 Tab 1  valsartan (DIOVAN) 160 mg tablet TAKE 1 TAB BY MOUTH DAILY. REPLACES VALSARTAN-HCTZ 30 Tab 11  
 LEVEMIR FLEXTOUCH 100 unit/mL (3 mL) inpn 30 UNITS IN THE MORNING AND 50 UNITS AT NIGHT. INCREASE AS DIRECTED TO MAX  UNITS PER DAY 30 mL 11  
 montelukast (SINGULAIR) 10 mg tablet Take 1 Tab by mouth daily. 90 Tab 2  
 levothyroxine (SYNTHROID) 150 mcg tablet TAKE 1 TAB BY MOUTH DAILY (BEFORE BREAKFAST). 90 Tab 3  
 fluticasone-salmeterol (ADVAIR HFA) 230-21 mcg/actuation inhaler Take 2 Puffs by inhalation two (2) times a day. 12 g 5  
 albuterol (PROVENTIL VENTOLIN) 2.5 mg /3 mL (0.083 %) nebulizer solution 3 mL by Nebulization route every four (4) hours as needed for Wheezing.  24 Each 11  
  cholecalciferol, VITAMIN D3, (VITAMIN D3) 5,000 unit tab tablet Take 5,000 Units by mouth daily.  furosemide (LASIX) 40 mg tablet Take 80 mg by mouth as needed. 3  
 albuterol (PROVENTIL HFA, VENTOLIN HFA, PROAIR HFA) 90 mcg/actuation inhaler Take 2 Puffs by inhalation every four (4) hours as needed for Wheezing.  fexofenadine (ALLEGRA) 180 mg tablet Take 180 mg by mouth daily. · It is important that you take the medication exactly as they are prescribed. · Keep your medication in the bottles provided by the pharmacist and keep a list of the medication names, dosages, and times to be taken in your wallet. · Do not take other medications without consulting your doctor. What to do at [facility] Recommended diet: Diabetic stay hydrated. You should take a stool softener such as colace for constipation. If constipation persists for >24 hours you should take a dose of Milk of Magnesia. Call if your constipation persists. . 
 
Recommended activity:  
Walk throughout the day as tolerated. If you have chest/shoulder or any concern of upper ext pain, call [phone] Follow up with Dr. Castillo Holman within the next 2 weeks Call Dr. Mihir Snell for a follow up apt soon If you experience any of the following persisting symptoms: 
 
Nausea/vomiting, fever > 101 F, diarrhea/constipation, increasing pain despite medication, increasing vaginal discharge or any concerns, please call our office. Follow-up with Dr. Zeina Leger in one month. We will call with your new chemotherapy appt's. Call [phone] for an appointment. Information obtained by : 
I understand that if any problems occur once I am at home I am to contact my physician. I understand and acknowledge receipt of the instructions indicated above. Physician's or R.N.'s Signature                                                                  Date/Time Patient or Representative Signature                                                          Date/Time Discharge Orders None Introducing [facility]! Dear Ana Luisa Mclean: Thank you for requesting a [employer] account. Our records indicate that you already have an active Home[employer] account. You can access your account anytime at [email or web address] Did you know that you can access your hospital and ER discharge instructions at any time in [employer]? You can also review all of your test results from your hospital stay or ER visit. Additional Information If you have questions, please visit the Frequently Asked Questions section of the [employer] website at [email or web address] Remember, [employer] is NOT to be used for urgent needs. For medical emergencies, dial 911. Now available from your iPhone and Android! General Information Please provide this summary of care documentation to your next provider. Patient Signature:  ____________________________________________________________ Date:  ____________________________________________________________  
  
[facility] Provider Signature:  ____________________________________________________________ Date:  ____________________________________________________________

## 2017-07-18 NOTE — PROGRESS NOTES
Admission Medication Reconciliation:    Information obtained from: patient    Significant PMH/Disease States:   Past Medical History:   Diagnosis Date    Adverse effect of anesthesia     sleep apnea cpap machine useage     Anemia     Arthritis     DDD low back and knees    Asthma     uses albuterol prn only; none x 6 mos. Never hospitalized    BRCA negative 2013    Calculus of kidney     Chronic kidney disease     kidney stones    Chronic pain     knee pain bilat    CINV (chemotherapy-induced nausea and vomiting) 2014    Diabetes ([facility] Age 46    IDDM    Environmental allergies     Fibromyalgia     GERD (gastroesophageal reflux disease)     controlled with med    Hypertension     Ill-defined condition     Sepsis     Ill-defined condition 2016    chemotherapy     Morbid obesity ([facility]     Other and unspecified hyperlipidemia     Ovarian cancer ([facility] 2012, 2014    serous, high grade, stage IIIc. s/p chemotx (has port)    Psychiatric disorder     Rectal bleeding     Thromboembolus ([facility]     POST PARTUM; resolved- PELVIS    Thyroid disease     HYPOTHYROID    Unspecified sleep apnea     CPAP, Dr. Bart Patel       Chief Complaint for this Admission:    Chief Complaint   Patient presents with    Chest Pain         Allergies:  Carboplatin; Iodinated contrast- oral and iv dye; Lipitor [atorvastatin]; Percocet [oxycodone-acetaminophen]; Shellfish containing products; and Tape [adhesive]    Prior to Admission Medications:   Prior to Admission Medications   Prescriptions Last Dose Informant Patient Reported? Taking? LEVEMIR FLEXTOUCH 100 unit/mL (3 mL) inpn   No No   Si UNITS IN THE MORNING AND 50 UNITS AT NIGHT. INCREASE AS DIRECTED TO MAX  UNITS PER DAY   Liraglutide (VICTOZA) 0.6 mg/0.1 mL (18 mg/3 mL) sub-q pen   No No   Si.3 mL by SubCUTAneous route daily (with lunch). OTHER,NON-FORMULARY,   Yes Yes   Si Tabs daily (after dinner).  Eye Promise (vitamin) SYMBICORT 160-4.5 mcg/actuation HFA inhaler   Yes No   Si Puffs two (2) times daily as needed. albuterol (PROVENTIL HFA, VENTOLIN HFA, PROAIR HFA) 90 mcg/actuation inhaler   Yes No   Sig: Take 2 Puffs by inhalation every four (4) hours as needed for Wheezing. albuterol (PROVENTIL VENTOLIN) 2.5 mg /3 mL (0.083 %) nebulizer solution   No No   Sig: 3 mL by Nebulization route every four (4) hours as needed for Wheezing. cholecalciferol, VITAMIN D3, (VITAMIN D3) 5,000 unit tab tablet   Yes No   Sig: Take 5,000 Units by mouth daily. clonazePAM (KLONOPIN) 0.5 mg tablet   Yes Yes   Sig: Take 0.5 mg by mouth nightly as needed. cycloSPORINE (RESTASIS) 0.05 % ophthalmic emulsion   Yes No   Si Drop two (2) times a day. fexofenadine (ALLEGRA) 180 mg tablet   Yes No   Sig: Take 180 mg by mouth daily. fluticasone (FLONASE) 50 mcg/actuation nasal spray   Yes No   fluticasone-salmeterol (ADVAIR HFA) 230-21 mcg/actuation inhaler   No No   Sig: Take 2 Puffs by inhalation two (2) times a day. furosemide (LASIX) 40 mg tablet   Yes No   Sig: Take 80 mg by mouth as needed. insulin aspart (NOVOLOG FLEXPEN) 100 unit/mL inpn   No No   Sig: INJECT 20 UNITS BEFORE EACH MEAL + 4 UNITS FOR EVERY 50 MG/DL ABOVE 150 MG/DL.  UNITS PER DAY   levothyroxine (SYNTHROID) 150 mcg tablet   No No   Sig: TAKE 1 TAB BY MOUTH DAILY (BEFORE BREAKFAST). methocarbamol (ROBAXIN) 500 mg tablet   Yes No   Sig: Take 500 mg by mouth three (3) times daily as needed. montelukast (SINGULAIR) 10 mg tablet   No No   Sig: Take 1 Tab by mouth daily. multivitamin (ONE A DAY) tablet   Yes No   Sig: Take 2 Tabs by mouth daily (after dinner). nystatin (MYCOSTATIN) powder   Yes Yes   Sig: Apply  to affected area four (4) times daily as needed.    omega-3 acid ethyl esters (LOVAZA) 1 gram capsule   Yes No   Sig: TAKE ONE CAPSULE BY MOUTH TWICE A DAY   ondansetron (ZOFRAN ODT) 8 mg disintegrating tablet   No No   Sig: Take 1 Tab by mouth every eight (8) hours as needed for Nausea. potassium chloride SR (KLOR-CON 10) 10 mEq tablet 7/17/2017  No Yes   Sig: Take 1 Tab by mouth two (2) days a week. Patient taking differently: Take 10 mEq by mouth two (2) days a week. Monday and wednesdays   sertraline (ZOLOFT) 50 mg tablet   Yes No   Sig: TAKE 1 TABLET BY MOUTH EVERY MORNING   valsartan (DIOVAN) 160 mg tablet   No No   Sig: TAKE 1 TAB BY MOUTH DAILY. REPLACES VALSARTAN-HCTZ      Facility-Administered Medications: None         Comments/Recommendations: This medication history was obtained from patient; (s)he appears to be a good historian. An RX Query is available. Medications added: none  Medications deleted: testing supplies, medrol, prometh dm  Medications amended: clonazepam from 1 mg to 0.5    Last doses of medication: took \"am\" meds today  Review of Allergies: not completed    Also of note: potassium is taken on mon/wed    Thank you for allowing me to participate in the care of this patient. Please contact the pharmacy () or the medication reconciliation pharmacy () with any questions.     Luma Brewster, MynorD

## 2017-07-18 NOTE — ED NOTES
Status check on pt; c/o pain of her chest and back rating it at a 7-8/10. Denies SOB. Medication for pain given. Currently resting in bed.

## 2017-07-18 NOTE — ED NOTES
TRANSFER - OUT REPORT:    Verbal report given to Jordan(name) on Wilbur Alas  being transferred to [facility](unit) for routine progression of care       Report consisted of patients Situation, Background, Assessment and   Recommendations(SBAR). Information from the following report(s) SBAR, Kardex, ED Summary, MAR, Recent Results and Cardiac Rhythm nsr was reviewed with the receiving nurse. Lines:   Venous Access Device bard power port 10/28/15 Upper chest (subclavicular area, right (Active)       Peripheral IV 04/03/17 Left Antecubital (Active)       Peripheral IV 07/18/17 Right Antecubital (Active)   Site Assessment Clean, dry, & intact 7/18/2017 11:48 AM   Phlebitis Assessment 0 7/18/2017 11:48 AM   Infiltration Assessment 0 7/18/2017 11:48 AM   Dressing Status Clean, dry, & intact 7/18/2017 11:48 AM   Dressing Type Transparent 7/18/2017 11:48 AM   Hub Color/Line Status Pink 7/18/2017 11:48 AM        Opportunity for questions and clarification was provided.       Patient transported with:

## 2017-07-18 NOTE — PROGRESS NOTES
Yasmine Hernandez, JAD      Date of visit: 7/18/2017       Subjective:   Ms. Chepe Durant is a long time pt of Dr. Nathen Mccormack. She was initially dx with ovarian cancer in Sept 2012. She had MARAH/BSO/Cytoreductive surgery on 9/21/12. At that time she was Stage III C. She recovered and began 6 cycles of Carbo/Taxol. Had good response, but reoccurred and in Fall of 2014 she received 3 cycles of Carbo/Taxol before having a reaction to Carbo and this was stopped. She continued her next 3 cycles with Doxil/Avastin. This did not decrease disease. .   She had a treatment break from 3/2015 until she restarted with Topatecan for 11 cycles from July 2015 through March 2016. She was followed by Dr. Nathen Mccormack and in Jan 2017, she had a CT scan that showed recurrence of disease (see below). She was started on Gemzar on 2/2/17 and has received 7 cycles with last on being 6/1/2017. Her next cycle had to be held due to requiring a tooh extraction. She has now recovered from this and presents for pre-chemo visit, labs and proceeding with treatment     SUBJECTIVE:  Pt says she has been able to eat and drink better since her teeth have been fixed. She is happy it is all behind her. She is anxious to resume her chemotherapy as she felt it was \"working\"        Diagnosis: Ovarian cancer  Original Surgery: 9/21/12 Ex-lap, ROULA/BSO, radical tumor debulking  Pathology: High grade serious ovarian cancer with omental cake and carcinomatosis. Stage IIIC        Current Outpatient Prescriptions   Medication Sig    insulin aspart (NOVOLOG FLEXPEN) 100 unit/mL inpn INJECT 20 UNITS BEFORE EACH MEAL + 4 UNITS FOR EVERY 50 MG/DL ABOVE 150 MG/DL.  UNITS PER DAY    promethazine-dextromethorphan (PROMETHAZINE-DM) 6.25-15 mg/5 mL syrup Take 5 mL by mouth nightly as needed for Cough.     methylPREDNISolone (MEDROL, ERNESTO,) 4 mg tablet As directed    SYMBICORT 160-4.5 mcg/actuation HFA inhaler     omega-3 acid ethyl esters (LOVAZA) 1 gram capsule TAKE ONE CAPSULE BY MOUTH TWICE A DAY    Liraglutide (VICTOZA) 0.6 mg/0.1 mL (18 mg/3 mL) sub-q pen 0.3 mL by SubCUTAneous route daily (with lunch).  nystatin (NYSTOP) powder Apply to affected area 4 x daily.  cycloSPORINE (RESTASIS) 0.05 % ophthalmic emulsion 1 Drop two (2) times a day.  OTHER Vitamin called easy promise takes two after dinner    multivitamin (ONE A DAY) tablet Take 2 Tabs by mouth daily (after dinner).  HYDROcodone-acetaminophen (NORCO) 5-325 mg per tablet Take 1 Tab by mouth every four (4) hours as needed for Pain. Max Daily Amount: 6 Tabs.  sertraline (ZOLOFT) 50 mg tablet TAKE 1 TABLET BY MOUTH EVERY MORNING    clonazePAM (KLONOPIN) 1 mg tablet TAKE 1 TABLET BY MOUTH TWICE A DAY    methocarbamol (ROBAXIN) 500 mg tablet Take  by mouth three (3) times daily.  ondansetron (ZOFRAN ODT) 8 mg disintegrating tablet Take 1 Tab by mouth every eight (8) hours as needed for Nausea.  valsartan (DIOVAN) 160 mg tablet TAKE 1 TAB BY MOUTH DAILY. REPLACES VALSARTAN-HCTZ    LEVEMIR FLEXTOUCH 100 unit/mL (3 mL) inpn 30 UNITS IN THE MORNING AND 50 UNITS AT NIGHT. INCREASE AS DIRECTED TO MAX  UNITS PER DAY    montelukast (SINGULAIR) 10 mg tablet Take 1 Tab by mouth daily.  levothyroxine (SYNTHROID) 150 mcg tablet TAKE 1 TAB BY MOUTH DAILY (BEFORE BREAKFAST).  Lancets misc Use as directed to test blood sugar three times daily-DX-DM-250.00    fluticasone-salmeterol (ADVAIR HFA) 230-21 mcg/actuation inhaler Take 2 Puffs by inhalation two (2) times a day.  BD INSULIN PEN NEEDLE UF SHORT 31 gauge x 5/16\" ndle USE WITH INSULIN TO INJECT FIVE TIMES DAILY.  albuterol (PROVENTIL VENTOLIN) 2.5 mg /3 mL (0.083 %) nebulizer solution 3 mL by Nebulization route every four (4) hours as needed for Wheezing.  potassium chloride SR (KLOR-CON 10) 10 mEq tablet Take 1 Tab by mouth two (2) days a week.     cholecalciferol, VITAMIN D3, (VITAMIN D3) 5,000 unit tab tablet Take  by mouth daily.  furosemide (LASIX) 40 mg tablet Take 80 mg by mouth as needed.  Blood-Glucose Meter monitoring kit Use as directed to test blood sugar three times daily-DX-DM-250.00    albuterol (PROVENTIL HFA, VENTOLIN HFA, PROAIR HFA) 90 mcg/actuation inhaler Take 2 Puffs by inhalation every four (4) hours as needed for Wheezing.  fexofenadine (ALLEGRA) 180 mg tablet Take 180 mg by mouth daily. No current facility-administered medications for this visit. Review of Systems:  General: Says happyt teeth are fixed. Acknowledges some increase in fatigue lately  HEENT: Denies visual changes, dysphagia or headache  Resp: Acknowledges some ECKERT that she feels is related to fatigue. Denies, wheezing or cough  CV: Says she began this past weekend with some \"back/shoulder blade sharp pain that went up to her shoulder and down her left arm off and on this weekend\". When questioned further, said it began again this morning and made her \"not feel good\". She says she knows she should have gone to the ER, but just didn't as she thought it would pass. GI/: Denies N/V, diarrhea, constipation or dysuria (says her \"green drinks\" keep her regular)  MuskSkel:Continues with DJD knee pain. She said she was told she needed a TKR, but has not gotten it yet. Continues to take tylenol for pain when needed and rest leg  Neuro: Denies dizzyness or syncope    Objective:     Patient Vitals for the past 8 hrs:   BP Temp Pulse Resp Height Weight   05/25/17 1237 115/71 - 70 18 - -   05/25/17 1010 105/68 98.3 °F (36.8 °C) 68 16 5' 8\" (1.727 m) 316 lb (143.3 kg)       Physical Exam:  General: A&O X3. Initially she was noted in NAD with pleasant affect. As the visit progress, she appeared more uncomfortable and said she was not feeling well  HEENT: Sclera anicteric, pupils equal and reactive to light. Mucosa pink, moist   Neck: No JVD.  No cervical adenopathy appreciated. Heart: Regular with persistent 2-3/6 murmur  Lungs: CTA Bilat without wheezing or rales   Abd: Soft, obese, NT/ND with + BS throughout  Ext: Without edema and + pedal pulses bilat  Neuro: grossly intact        CT ABD/Pelvis without contrast 4/4/2017:  FINDINGS:   LUNG BASES: Clear. INCIDENTALLY IMAGED HEART AND MEDIASTINUM: Enlarged pericardiophrenic and  diaphragmatic lymph nodes. LIVER: No mass or biliary dilatation. GALLBLADDER: Unremarkable. SPLEEN: No mass. PANCREAS: No mass or ductal dilatation. ADRENALS: Unremarkable. KIDNEYS/URETERS: Bilateral nephrolithiasis. There is soft tissue density in the  region of the left renal hilum.     STOMACH: Unremarkable. SMALL BOWEL: No dilatation or wall thickening. COLON: No dilatation or wall thickening. APPENDIX: Unremarkable. PERITONEUM: There multiple enlarged lymph nodes within the mesentery. There is  significant pelvic adenopathy and an enlarged right groin lymph node. RETROPERITONEUM: No aneurysm. REPRODUCTIVE ORGANS:  URINARY BLADDER: No mass or calculus. BONES: No destructive bone lesion. ADDITIONAL COMMENTS: N/A     IMPRESSION  IMPRESSION:     1. Bilateral nephrolithiasis. 2. Soft tissue density left renal hilum. It would be helpful to obtain a CT with  contrast.  3. No significant change in multiple enlarged abdominal and pelvic lymph nodes. Pathology:   FINAL PATHOLOGIC DIAGNOSIS  1. Omentum, omentectomy:  Serous carcinoma, high grade  2. Soft tissue, umbilicus, biopsy:  Serous carcinoma, high grade  3. Peritoneum, periappendiceal, biopsy:  Serous carcinoma, high grade  4. Colonic mesentery, biopsy:  Serous carcinoma, high grade  See comment  5.  Uterus (157 grams), ovaries and fallopian tubes, supracervical hysterectomy and bilateral salpingooophorectomy:  Cervix: No diagnostic pathologic abnormality  Myometrium: No diagnostic pathologic abnormality  Endometrium: Benign polyp and cystic atrophy  Ovaries and fallopian tubes: Serous carcinoma, high grade  Serosa: Serous carcinoma implants, high-grade  See synoptic report  Synoptic Report:  Specimen: Uterus, ovaries and fallopian tubes, omentum  Procedure: Supracervical hysterectomy, bilateral salpingo-oophorectomy, omentectomy  Lymph node sampling: No lymph nodes present  Specimen integrity:  Right ovary: Intact        Assessment:     Patient Active Problem List   Diagnosis Code    Histor of ovarian cancer Z85.43    Carcinomatosis ([address] C80.0    Controlled type 2 diabetes mellitus without complication, with long-term current use of insulin ([address] E11.9, Z79.4    Morbid obesity ([address] E66.01    Abnormal mammogram R92.8    Sleep apnea G47.30    Dyslipidemia (high LDL; low HDL) E78.4    Benign essential hypertension I10    Disorder of thyroid E07.9    CINV (chemotherapy-induced nausea and vomiting) R11.2, T45.1X5A    Swelling of eye, bilateral--left > right H57.8    Portacath in place--left Z95.828    Chest pain, atypical R07.89    Hx of pulmonary embolus during pregnancy Z86.711, Z87.59    Elevated CA-125 R97.1    Ovarian cancer (HCC) C56.9    Hyperglycemia due to type 2 diabetes mellitus ([address] E11.65    Preop cardiovascular exam Z01.810    Angina pectoris associated with type 2 diabetes mellitus (HCC) E11.59, I20.9    ARF (acute renal failure) (HCC) N17.9    Renal calculi N20.0    Elevated BUN R79.9    Pulmonary hypertension, mild (HCC) I27.2    UTI (urinary tract infection) N39.0         Plan:   Pt continued to state her chest pain was becoming more like heavy pressure and that she was \"feeling bad\". She began to become diaphoretic and was taken by w/c directly to the ER. Once there, she acknowledged the pain and pressure were increasing. Her son was called by our office and explained that asa  Precaution, she was taken to the ER and he would come directly to the ER to be with his mother. We will follow up with pt.      Yvan Amos NP

## 2017-07-19 VITALS
BODY MASS INDEX: 43.4 KG/M2 | RESPIRATION RATE: 16 BRPM | WEIGHT: 293 LBS | SYSTOLIC BLOOD PRESSURE: 109 MMHG | TEMPERATURE: 97.7 F | HEART RATE: 76 BPM | DIASTOLIC BLOOD PRESSURE: 65 MMHG | OXYGEN SATURATION: 96 % | HEIGHT: 69 IN

## 2017-07-19 LAB
ALBUMIN SERPL BCP-MCNC: 2.9 G/DL (ref 3.5–5)
ALBUMIN/GLOB SERPL: 0.6 {RATIO} (ref 1.1–2.2)
ALP SERPL-CCNC: 87 U/L (ref 45–117)
ALT SERPL-CCNC: 22 U/L (ref 12–78)
ANION GAP BLD CALC-SCNC: 11 MMOL/L (ref 5–15)
AST SERPL W P-5'-P-CCNC: 24 U/L (ref 15–37)
ATRIAL RATE: 64 BPM
BASOPHILS # BLD AUTO: 0 K/UL (ref 0–0.1)
BASOPHILS # BLD: 0 % (ref 0–1)
BILIRUB SERPL-MCNC: 0.2 MG/DL (ref 0.2–1)
BUN SERPL-MCNC: 29 MG/DL (ref 6–20)
BUN/CREAT SERPL: 21 (ref 12–20)
CALCIUM SERPL-MCNC: 8.5 MG/DL (ref 8.5–10.1)
CALCULATED P AXIS, ECG09: 66 DEGREES
CALCULATED R AXIS, ECG10: -3 DEGREES
CALCULATED T AXIS, ECG11: 13 DEGREES
CHLORIDE SERPL-SCNC: 111 MMOL/L (ref 97–108)
CO2 SERPL-SCNC: 21 MMOL/L (ref 21–32)
CREAT SERPL-MCNC: 1.39 MG/DL (ref 0.55–1.02)
D DIMER PPP FEU-MCNC: 9.15 MG/L FEU (ref 0–0.65)
DIAGNOSIS, 93000: NORMAL
EOSINOPHIL # BLD: 0 K/UL (ref 0–0.4)
EOSINOPHIL NFR BLD: 0 % (ref 0–7)
ERYTHROCYTE [DISTWIDTH] IN BLOOD BY AUTOMATED COUNT: 13.6 % (ref 11.5–14.5)
EST. AVERAGE GLUCOSE BLD GHB EST-MCNC: 117 MG/DL
GLOBULIN SER CALC-MCNC: 5.1 G/DL (ref 2–4)
GLUCOSE BLD STRIP.AUTO-MCNC: 160 MG/DL (ref 65–100)
GLUCOSE SERPL-MCNC: 166 MG/DL (ref 65–100)
HBA1C MFR BLD: 5.7 % (ref 4.2–6.3)
HCT VFR BLD AUTO: 27.6 % (ref 35–47)
HGB BLD-MCNC: 8.7 G/DL (ref 11.5–16)
LYMPHOCYTES # BLD AUTO: 16 % (ref 12–49)
LYMPHOCYTES # BLD: 1 K/UL (ref 0.8–3.5)
MAGNESIUM SERPL-MCNC: 1.7 MG/DL (ref 1.6–2.4)
MCH RBC QN AUTO: 28.7 PG (ref 26–34)
MCHC RBC AUTO-ENTMCNC: 31.5 G/DL (ref 30–36.5)
MCV RBC AUTO: 91.1 FL (ref 80–99)
MONOCYTES # BLD: 0.7 K/UL (ref 0–1)
MONOCYTES NFR BLD AUTO: 11 % (ref 5–13)
NEUTS SEG # BLD: 4.5 K/UL (ref 1.8–8)
NEUTS SEG NFR BLD AUTO: 73 % (ref 32–75)
P-R INTERVAL, ECG05: 168 MS
PLATELET # BLD AUTO: 186 K/UL (ref 150–400)
POTASSIUM SERPL-SCNC: 4.5 MMOL/L (ref 3.5–5.1)
PROT SERPL-MCNC: 8 G/DL (ref 6.4–8.2)
Q-T INTERVAL, ECG07: 412 MS
QRS DURATION, ECG06: 88 MS
QTC CALCULATION (BEZET), ECG08: 425 MS
RBC # BLD AUTO: 3.03 M/UL (ref 3.8–5.2)
SERVICE CMNT-IMP: ABNORMAL
SODIUM SERPL-SCNC: 143 MMOL/L (ref 136–145)
TROPONIN I SERPL-MCNC: <0.04 NG/ML
TSH SERPL DL<=0.05 MIU/L-ACNC: 0.24 UIU/ML (ref 0.36–3.74)
VENTRICULAR RATE, ECG03: 64 BPM
WBC # BLD AUTO: 6.2 K/UL (ref 3.6–11)

## 2017-07-19 PROCEDURE — 74011250637 HC RX REV CODE- 250/637: Performed by: NURSE PRACTITIONER

## 2017-07-19 PROCEDURE — 93005 ELECTROCARDIOGRAM TRACING: CPT

## 2017-07-19 PROCEDURE — 74011000250 HC RX REV CODE- 250: Performed by: NURSE PRACTITIONER

## 2017-07-19 PROCEDURE — 80053 COMPREHEN METABOLIC PANEL: CPT | Performed by: NURSE PRACTITIONER

## 2017-07-19 PROCEDURE — 99218 HC RM OBSERVATION: CPT

## 2017-07-19 PROCEDURE — 85379 FIBRIN DEGRADATION QUANT: CPT | Performed by: OBSTETRICS & GYNECOLOGY

## 2017-07-19 PROCEDURE — 83036 HEMOGLOBIN GLYCOSYLATED A1C: CPT | Performed by: OBSTETRICS & GYNECOLOGY

## 2017-07-19 PROCEDURE — 94640 AIRWAY INHALATION TREATMENT: CPT

## 2017-07-19 PROCEDURE — 85025 COMPLETE CBC W/AUTO DIFF WBC: CPT | Performed by: NURSE PRACTITIONER

## 2017-07-19 PROCEDURE — 74011636637 HC RX REV CODE- 636/637: Performed by: NURSE PRACTITIONER

## 2017-07-19 PROCEDURE — 74011000258 HC RX REV CODE- 258: Performed by: NURSE PRACTITIONER

## 2017-07-19 PROCEDURE — 82962 GLUCOSE BLOOD TEST: CPT

## 2017-07-19 PROCEDURE — 36415 COLL VENOUS BLD VENIPUNCTURE: CPT | Performed by: NURSE PRACTITIONER

## 2017-07-19 PROCEDURE — 83735 ASSAY OF MAGNESIUM: CPT | Performed by: NURSE PRACTITIONER

## 2017-07-19 PROCEDURE — 96361 HYDRATE IV INFUSION ADD-ON: CPT

## 2017-07-19 PROCEDURE — 84484 ASSAY OF TROPONIN QUANT: CPT | Performed by: NURSE PRACTITIONER

## 2017-07-19 PROCEDURE — 84443 ASSAY THYROID STIM HORMONE: CPT | Performed by: OBSTETRICS & GYNECOLOGY

## 2017-07-19 PROCEDURE — 74011250636 HC RX REV CODE- 250/636: Performed by: NURSE PRACTITIONER

## 2017-07-19 RX ORDER — HYDROCODONE BITARTRATE AND ACETAMINOPHEN 5; 325 MG/1; MG/1
1 TABLET ORAL
Qty: 30 TAB | Refills: 0 | Status: SHIPPED | OUTPATIENT
Start: 2017-07-19 | End: 2017-09-26 | Stop reason: SDUPTHER

## 2017-07-19 RX ORDER — ALBUTEROL SULFATE 0.83 MG/ML
SOLUTION RESPIRATORY (INHALATION)
Status: DISCONTINUED
Start: 2017-07-19 | End: 2017-07-19

## 2017-07-19 RX ADMIN — SERTRALINE HYDROCHLORIDE 50 MG: 50 TABLET ORAL at 07:01

## 2017-07-19 RX ADMIN — LEVOTHYROXINE SODIUM 150 MCG: 150 TABLET ORAL at 06:53

## 2017-07-19 RX ADMIN — INSULIN LISPRO 3 UNITS: 100 INJECTION, SOLUTION INTRAVENOUS; SUBCUTANEOUS at 07:18

## 2017-07-19 RX ADMIN — HYDROCODONE BITARTRATE AND ACETAMINOPHEN 1 TABLET: 5; 325 TABLET ORAL at 07:17

## 2017-07-19 RX ADMIN — VALSARTAN 160 MG: 80 TABLET ORAL at 08:52

## 2017-07-19 RX ADMIN — ALBUTEROL SULFATE 1 DOSE: 2.5 SOLUTION RESPIRATORY (INHALATION) at 07:21

## 2017-07-19 RX ADMIN — HYDROCODONE BITARTRATE AND ACETAMINOPHEN 1 TABLET: 5; 325 TABLET ORAL at 00:58

## 2017-07-19 RX ADMIN — HYDROCODONE BITARTRATE AND ACETAMINOPHEN 1 TABLET: 5; 325 TABLET ORAL at 11:29

## 2017-07-19 RX ADMIN — SODIUM CHLORIDE 100 ML/HR: 450 INJECTION, SOLUTION INTRAVENOUS at 07:10

## 2017-07-19 RX ADMIN — SODIUM CHLORIDE 500 ML: 900 INJECTION, SOLUTION INTRAVENOUS at 08:51

## 2017-07-19 RX ADMIN — INSULIN GLARGINE 30 UNITS: 100 INJECTION, SOLUTION SUBCUTANEOUS at 08:54

## 2017-07-19 RX ADMIN — BUDESONIDE 500 MCG: 0.5 INHALANT RESPIRATORY (INHALATION) at 07:20

## 2017-07-19 RX ADMIN — Medication 10 ML: at 07:18

## 2017-07-19 NOTE — DISCHARGE INSTRUCTIONS
[address] [place] [address]  MD Marisel Nichole MD Casimir Carson, JAD    Patient Discharge Instructions    Artem Escalera / [id number] : 1960    Admitted 2017 Discharged: 2017     Take Home Medications     No current facility-administered medications on file prior to encounter. Current Outpatient Prescriptions on File Prior to Encounter   Medication Sig Dispense Refill    potassium chloride SR (KLOR-CON 10) 10 mEq tablet Take 1 Tab by mouth two (2) days a week. (Patient taking differently: Take 10 mEq by mouth two (2) days a week. Monday and ) 30 Tab 1    fluticasone (FLONASE) 50 mcg/actuation nasal spray       insulin aspart (NOVOLOG FLEXPEN) 100 unit/mL inpn INJECT 20 UNITS BEFORE EACH MEAL + 4 UNITS FOR EVERY 50 MG/DL ABOVE 150 MG/DL.  UNITS PER DAY 30 mL 11    SYMBICORT 160-4.5 mcg/actuation HFA inhaler 2 Puffs two (2) times daily as needed.  omega-3 acid ethyl esters (LOVAZA) 1 gram capsule TAKE ONE CAPSULE BY MOUTH TWICE A DAY  3    Liraglutide (VICTOZA) 0.6 mg/0.1 mL (18 mg/3 mL) sub-q pen 0.3 mL by SubCUTAneous route daily (with lunch). 3 Each 11    cycloSPORINE (RESTASIS) 0.05 % ophthalmic emulsion 1 Drop two (2) times a day.  multivitamin (ONE A DAY) tablet Take 2 Tabs by mouth daily (after dinner).  sertraline (ZOLOFT) 50 mg tablet TAKE 1 TABLET BY MOUTH EVERY MORNING  1    methocarbamol (ROBAXIN) 500 mg tablet Take 500 mg by mouth three (3) times daily as needed.  ondansetron (ZOFRAN ODT) 8 mg disintegrating tablet Take 1 Tab by mouth every eight (8) hours as needed for Nausea. 30 Tab 1    valsartan (DIOVAN) 160 mg tablet TAKE 1 TAB BY MOUTH DAILY. REPLACES VALSARTAN-HCTZ 30 Tab 11    LEVEMIR FLEXTOUCH 100 unit/mL (3 mL) inpn 30 UNITS IN THE MORNING AND 50 UNITS AT NIGHT. INCREASE AS DIRECTED TO MAX  UNITS PER DAY 30 mL 11    montelukast (SINGULAIR) 10 mg tablet Take 1 Tab by mouth daily.  90 Tab 2    levothyroxine (SYNTHROID) 150 mcg tablet TAKE 1 TAB BY MOUTH DAILY (BEFORE BREAKFAST). 90 Tab 3    fluticasone-salmeterol (ADVAIR HFA) 230-21 mcg/actuation inhaler Take 2 Puffs by inhalation two (2) times a day. 12 g 5    albuterol (PROVENTIL VENTOLIN) 2.5 mg /3 mL (0.083 %) nebulizer solution 3 mL by Nebulization route every four (4) hours as needed for Wheezing. 24 Each 11    cholecalciferol, VITAMIN D3, (VITAMIN D3) 5,000 unit tab tablet Take 5,000 Units by mouth daily.  furosemide (LASIX) 40 mg tablet Take 80 mg by mouth as needed. 3    albuterol (PROVENTIL HFA, VENTOLIN HFA, PROAIR HFA) 90 mcg/actuation inhaler Take 2 Puffs by inhalation every four (4) hours as needed for Wheezing.  fexofenadine (ALLEGRA) 180 mg tablet Take 180 mg by mouth daily. · It is important that you take the medication exactly as they are prescribed. · Keep your medication in the bottles provided by the pharmacist and keep a list of the medication names, dosages, and times to be taken in your wallet. · Do not take other medications without consulting your doctor. What to do at Home    Recommended diet: Diabetic stay hydrated. You should take a stool softener such as colace for constipation. If constipation persists for >24 hours you should take a dose of Milk of Magnesia. Call if your constipation persists. .    Recommended activity:   Walk throughout the day as tolerated. If you have chest/shoulder or any concern of upper ext pain, call 911  Follow up with Dr. Forrest Michael within the next 2 weeks  Call Dr. Maribell Cali for a follow up apt soon    If you experience any of the following persisting symptoms:    Nausea/vomiting, fever > 101 F, diarrhea/constipation, increasing pain despite medication, increasing vaginal discharge or any concerns, please call our office. Follow-up with Dr. Alejandro Monet in one month. We will call with your new chemotherapy appt's.   Call [phone] for an appointment. Information obtained by :  I understand that if any problems occur once I am at home I am to contact my physician. I understand and acknowledge receipt of the instructions indicated above.                                                                                                                                            Physician's or R.N.'s Signature                                                                  Date/Time                                                                                                                                              Patient or Representative Signature                                                          Date/Time

## 2017-07-19 NOTE — PROGRESS NOTES
Spoke with Jorge Morelos NP about lab results ,Ddimer is 9.15. Warden Mejia said patient is discharged.

## 2017-07-19 NOTE — PROGRESS NOTES
Audria Brim Dr. Alverna Eke Dr. Belvie Hamman. Mary NP      Date of visit: 7/19/2017   Hospital Day # 2    Subjective:   Pt did well overnight. She says she feels better and states she is breathing \"easier\"  Has been up walking in room without difficulty or distress. Diagnosis: Ovarian cancer  Original Surgery: 9/21/12 Ex-lap, ROULA/BSO, radical tumor debulking  Pathology: High grade serious ovarian cancer with omental cake and carcinomatosis.  Stage IIIC        Current Facility-Administered Medications   Medication Dose Route Frequency    albuterol (PROVENTIL VENTOLIN) 2.5 mg /3 mL (0.083 %) nebulizer solution        sodium chloride 0.9 % bolus infusion 500 mL  500 mL IntraVENous ONCE    sodium chloride (NS) flush 5-10 mL  5-10 mL IntraVENous Q8H    sodium chloride (NS) flush 5-10 mL  5-10 mL IntraVENous PRN    acetaminophen (TYLENOL) tablet 650 mg  650 mg Oral Q4H PRN    HYDROcodone-acetaminophen (NORCO) 5-325 mg per tablet 1 Tab  1 Tab Oral Q4H PRN    morphine injection 2 mg  2 mg IntraVENous Q4H PRN    naloxone (NARCAN) injection 0.4 mg  0.4 mg IntraVENous PRN    diphenhydrAMINE (BENADRYL) injection 12.5 mg  12.5 mg IntraVENous Q4H PRN    ondansetron (ZOFRAN) injection 4 mg  4 mg IntraVENous Q4H PRN    LORazepam (ATIVAN) injection 1 mg  1 mg IntraVENous Q6H PRN    enoxaparin (LOVENOX) injection 40 mg  40 mg SubCUTAneous Q24H    albuterol 5mg / ipratropium 0.5mg neb solution  1 Dose Nebulization QID RT    insulin lispro (HUMALOG) injection   SubCUTAneous AC&HS    glucose chewable tablet 16 g  4 Tab Oral PRN    glucagon (GLUCAGEN) injection 1 mg  1 mg IntraMUSCular PRN    0.45% sodium chloride infusion  100 mL/hr IntraVENous CONTINUOUS    fluticasone (FLONASE) 50 mcg/actuation nasal spray 2 Spray  2 Spray Both Nostrils DAILY    sertraline (ZOLOFT) tablet 50 mg  50 mg Oral DAILY    methocarbamol (ROBAXIN) tablet 500 mg  500 mg Oral Q8H PRN    valsartan (DIOVAN) tablet 160 mg  160 mg Oral DAILY    montelukast (SINGULAIR) tablet 10 mg  10 mg Oral QHS    levothyroxine (SYNTHROID) tablet 150 mcg  150 mcg Oral ACB    insulin glargine (LANTUS) injection 30 Units  30 Units SubCUTAneous DAILY    dextrose 10 % infusion 125-250 mL  125-250 mL IntraVENous PRN    arformoterol (BROVANA) neb solution 15 mcg  15 mcg Nebulization BID RT    And    budesonide (PULMICORT) 500 mcg/2 ml nebulizer suspension  500 mcg Nebulization BID RT        Review of Systems:  General: Says she feels better and would be happy to go home   HEENT: Denies visual changes, dysphagia or headache  Resp: Denies SOB. Denies, wheezing or cough  CV: Says CP has not returned during night and feels is is better   GI/: Denies N/V, diarrhea, constipation or dysuria   [place]el:Continues with chronic DJD knee pain. Neuro: Denies dizzyness or syncope    Objective:     Patient Vitals for the past 8 hrs:   BP Temp Pulse Resp SpO2   07/19/17 0410 110/62 97.8 °F (36.6 °C) (!) 57 16 94 %   07/19/17 0014 114/61 98.1 °F (36.7 °C) 61 16 92 %       Physical Exam:  General: A&O X3 in NAD in good spirits  HEENT: Sclera anicteric, pupils equal and reactive to light. Mucosa pink, moist   Neck: No JVD. No cervical adenopathy appreciated.    Heart: Regular with persistent 2-3/6 murmur  Lungs: CTA Bilat without wheezing or rales   Abd: Soft, obese, NT/ND with + BS throughout  Ext: Without edema and + pedal pulses bilat  Neuro: grossly intact    Recent Results (from the past 12 hour(s))   URINALYSIS W/MICROSCOPIC    Collection Time: 07/18/17  9:35 PM   Result Value Ref Range    Color YELLOW/STRAW      Appearance CLEAR CLEAR      Specific gravity 1.020 1.003 - 1.030      pH (UA) 5.0 5.0 - 8.0      Protein 30 (A) NEG mg/dL    Glucose 250 (A) NEG mg/dL    Ketone NEGATIVE  NEG mg/dL    Bilirubin NEGATIVE  NEG      Blood TRACE (A) NEG      Urobilinogen 0.2 0.2 - 1.0 EU/dL    Nitrites NEGATIVE  NEG      Leukocyte Esterase SMALL (A) NEG      WBC 10-20 0 - 4 /hpf    RBC 0-5 0 - 5 /hpf    Epithelial cells FEW FEW /lpf    Bacteria NEGATIVE  NEG /hpf    Hyaline cast 0-2 0 - 5 /lpf   GLUCOSE, POC    Collection Time: 07/18/17  9:59 PM   Result Value Ref Range    Glucose (POC) 280 (H) 65 - 100 mg/dL    Performed by Ariella Lauren PCT    METABOLIC PANEL, COMPREHENSIVE    Collection Time: 07/19/17  5:15 AM   Result Value Ref Range    Sodium 143 136 - 145 mmol/L    Potassium 4.5 3.5 - 5.1 mmol/L    Chloride 111 (H) 97 - 108 mmol/L    CO2 21 21 - 32 mmol/L    Anion gap 11 5 - 15 mmol/L    Glucose 166 (H) 65 - 100 mg/dL    BUN 29 (H) 6 - 20 MG/DL    Creatinine 1.39 (H) 0.55 - 1.02 MG/DL    BUN/Creatinine ratio 21 (H) 12 - 20      GFR est AA 48 (L) >60 ml/min/1.73m2    GFR est non-AA 39 (L) >60 ml/min/1.73m2    Calcium 8.5 8.5 - 10.1 MG/DL    Bilirubin, total 0.2 0.2 - 1.0 MG/DL    ALT (SGPT) 22 12 - 78 U/L    AST (SGOT) 24 15 - 37 U/L    Alk. phosphatase 87 45 - 117 U/L    Protein, total 8.0 6.4 - 8.2 g/dL    Albumin 2.9 (L) 3.5 - 5.0 g/dL    Globulin 5.1 (H) 2.0 - 4.0 g/dL    A-G Ratio 0.6 (L) 1.1 - 2.2     CBC WITH AUTOMATED DIFF    Collection Time: 07/19/17  5:15 AM   Result Value Ref Range    WBC 6.2 3.6 - 11.0 K/uL    RBC 3.03 (L) 3.80 - 5.20 M/uL    HGB 8.7 (L) 11.5 - 16.0 g/dL    HCT 27.6 (L) 35.0 - 47.0 %    MCV 91.1 80.0 - 99.0 FL    MCH 28.7 26.0 - 34.0 PG    MCHC 31.5 30.0 - 36.5 g/dL    RDW 13.6 11.5 - 14.5 %    PLATELET 677 037 - 570 K/uL    NEUTROPHILS 73 32 - 75 %    LYMPHOCYTES 16 12 - 49 %    MONOCYTES 11 5 - 13 %    EOSINOPHILS 0 0 - 7 %    BASOPHILS 0 0 - 1 %    ABS. NEUTROPHILS 4.5 1.8 - 8.0 K/UL    ABS. LYMPHOCYTES 1.0 0.8 - 3.5 K/UL    ABS. MONOCYTES 0.7 0.0 - 1.0 K/UL    ABS. EOSINOPHILS 0.0 0.0 - 0.4 K/UL    ABS.  BASOPHILS 0.0 0.0 - 0.1 K/UL   TROPONIN I    Collection Time: 07/19/17  5:15 AM   Result Value Ref Range    Troponin-I, Qt. <0.04 <0.05 ng/mL   MAGNESIUM    Collection Time: 07/19/17  5:15 AM   Result Value Ref Range    Magnesium 1.7 1.6 - 2.4 mg/dL   TSH 3RD GENERATION    Collection Time: 07/19/17  5:15 AM   Result Value Ref Range    TSH 0.24 (L) 0.36 - 3.74 uIU/mL   HEMOGLOBIN A1C WITH EAG    Collection Time: 07/19/17  5:15 AM   Result Value Ref Range    Hemoglobin A1c 5.7 4.2 - 6.3 %    Est. average glucose 117 mg/dL   GLUCOSE, POC    Collection Time: 07/19/17  7:04 AM   Result Value Ref Range    Glucose (POC) 160 (H) 65 - 100 mg/dL    Performed by Mayela Linares)          CT ABD/Pelvis without contrast 4/4/2017:  FINDINGS:   LUNG BASES: Clear. INCIDENTALLY IMAGED HEART AND MEDIASTINUM: Enlarged pericardiophrenic and  diaphragmatic lymph nodes. LIVER: No mass or biliary dilatation. GALLBLADDER: Unremarkable. SPLEEN: No mass. PANCREAS: No mass or ductal dilatation. ADRENALS: Unremarkable. KIDNEYS/URETERS: Bilateral nephrolithiasis. There is soft tissue density in the  region of the left renal hilum.     STOMACH: Unremarkable. SMALL BOWEL: No dilatation or wall thickening. COLON: No dilatation or wall thickening. APPENDIX: Unremarkable. PERITONEUM: There multiple enlarged lymph nodes within the mesentery. There is  significant pelvic adenopathy and an enlarged right groin lymph node. RETROPERITONEUM: No aneurysm. REPRODUCTIVE ORGANS:  URINARY BLADDER: No mass or calculus. BONES: No destructive bone lesion. ADDITIONAL COMMENTS: N/A     IMPRESSION  IMPRESSION:     1. Bilateral nephrolithiasis. 2. Soft tissue density left renal hilum. It would be helpful to obtain a CT with  contrast.  3. No significant change in multiple enlarged abdominal and pelvic lymph nodes. Pathology:   FINAL PATHOLOGIC DIAGNOSIS  1. Omentum, omentectomy:  Serous carcinoma, high grade  2. Soft tissue, umbilicus, biopsy:  Serous carcinoma, high grade  3. Peritoneum, periappendiceal, biopsy:  Serous carcinoma, high grade  4. Colonic mesentery, biopsy:  Serous carcinoma, high grade  See comment  5.  Uterus (157 grams), ovaries and fallopian tubes, supracervical hysterectomy and bilateral salpingooophorectomy:  Cervix: No diagnostic pathologic abnormality  Myometrium: No diagnostic pathologic abnormality  Endometrium: Benign polyp and cystic atrophy  Ovaries and fallopian tubes: Serous carcinoma, high grade  Serosa: Serous carcinoma implants, high-grade  See synoptic report  Synoptic Report:  Specimen: Uterus, ovaries and fallopian tubes, omentum  Procedure: Supracervical hysterectomy, bilateral salpingo-oophorectomy, omentectomy  Lymph node sampling: No lymph nodes present  Specimen integrity:  Right ovary: Intact        Assessment:     Patient Active Problem List   Diagnosis Code    Histor of ovarian cancer Z85.43    Carcinomatosis ([facility] [address] C80.0    Controlled type 2 diabetes mellitus without complication, with long-term current use of insulin ([address] E11.9, Z79.4    Morbid obesity ([facility] [address] E66.01    Abnormal mammogram R92.8    Sleep apnea G47.30    Dyslipidemia (high LDL; low HDL) E78.4    Benign essential hypertension I10    Disorder of thyroid E07.9    CINV (chemotherapy-induced nausea and vomiting) R11.2, T45.1X5A    Swelling of eye, bilateral--left > right H57.8    Portacath in place--left Z95.828    Chest pain, atypical R07.89    Hx of pulmonary embolus during pregnancy Z86.711, Z87.59    Elevated CA-125 R97.1    Ovarian cancer (HCC) C56.9    Hyperglycemia due to type 2 diabetes mellitus ([address] E11.65    Preop cardiovascular exam Z01.810    Angina pectoris associated with type 2 diabetes mellitus (HCC) E11.59, I20.9    ARF (acute renal failure) (HCC) N17.9    Renal calculi N20.0    Elevated BUN R79.9    Pulmonary hypertension, mild (HCC) I27.2    UTI (urinary tract infection) N39.0    Wheezing R06.2    Lymphadenopathy, mediastinal R59.0         Plan:   D-Dimer pending.   Once returned and if not elevated, will be able to be discharged  Pt will make a follow up with Dr. Agnes king as well as  Ana Rod  We will plan on resuming chemotherapy and will arrange apt's  She was encouraged to call for any concerns or questions. Noted TSH 0.24. Pt is to follow up with Dr. Juan R Jo.    Pt seen by Dr. Travis Wade, NP

## 2017-07-19 NOTE — PROGRESS NOTES
Bedside shift change report given to Tiarra Reynaga (oncoming nurse) by Maddison Hirsch RN (offgoing nurse). Report included the following information SBAR.

## 2017-07-19 NOTE — DIABETES MGMT
DTC Consult Note    Recommendations/ Comments: BG's stable. A1c is 5.7%. Concerned about hypoglycemia. Pt receives chemotherapy and appetite varies. For discharge today. Consult received for:  [x]             Assessment of home management                []      Medication Recommendations                []             Meter/monitoring     []             Insulin instruction     []             New diagnosis     []             Outpatient education     []             Insulin pump patient     []             Insulin infusion     []             DKA/HHS    Chart reviewed and initial evaluation complete on [facility]. Patient is a 55 y.o. female with known DM on Levemir 30 units each AM and 50 units each PM, Novolog 20 units + 4 units / 5-mg/dL for BG > 150 and Victoza weekly at home. BG monitoring at home 2-4x/day. Assessed and instructed patient on the following:   ·  interpretation of lab results, blood sugar goals, hypoglycemia prevention and treatment, SMBG skills and nutrition. She had questions about breakfast foods - suggestions given for cereals and oatmeal. Also recommended Glucerna, Ensure ofr diabetics or [place] Instant Breakfast for times ehr appetite is low. Encouraged the following:   · If hypoglycemia occurs > 2x/week contact MD for medication adjustment    Provided patient with the following: [x]             Survival skills education materials               []             Insulin education materials               []             CHO counting education materials               [x]             Outpatient DTC contact number               []             Glucometer                 Discussed with patient and/or family need for follow up appointment for diabetes management after discharge.       A1c:   Lab Results   Component Value Date/Time    Hemoglobin A1c 5.7 07/19/2017 05:15 AM       Recent Glucose Results: Lab Results   Component Value Date/Time     (H) 07/19/2017 05:15 AM     (H) 07/18/2017 11:34 AM    GLUCPOC 160 (H) 07/19/2017 07:04 AM    GLUCPOC 280 (H) 07/18/2017 09:59 PM        Lab Results   Component Value Date/Time    Creatinine 1.39 07/19/2017 05:15 AM     Estimated Creatinine Clearance: 69.8 mL/min (based on Cr of 1.39). Active Orders   Diet    DIET DIABETIC CONSISTENT CARB Regular        PO intake: Patient Vitals for the past 72 hrs:   % Diet Eaten   07/18/17 1950 100 %       Current hospital DM medication: Lantus 30 units daily and lispro resistant correction scale    Will continue to follow as needed. Thank you.   Lilia Vu RN, CDE

## 2017-07-19 NOTE — PROGRESS NOTES
500cc bolus completed. D-dimer is 9.15. Left message with Indira NGUYENconcerning results Waiting to hear back. Roberta Vick

## 2017-07-20 ENCOUNTER — TELEPHONE (OUTPATIENT)
Dept: INTERNAL MEDICINE CLINIC | Age: 57
End: 2017-07-20

## 2017-07-20 NOTE — TELEPHONE ENCOUNTER
Patient states she needs a call back to get a Hospital Follow Up appt for Discharge on 7/19/17. Please call.  Thank you

## 2017-07-21 ENCOUNTER — TELEPHONE (OUTPATIENT)
Dept: GYNECOLOGY | Age: 57
End: 2017-07-21

## 2017-07-21 NOTE — TELEPHONE ENCOUNTER
Pt states she has a hospital f/u appt with Dr. Fiona Feliciano 7/27/17. I will go ahead and schedule chemo appts for her to begin after that OV and give her a copy of schedule then also.

## 2017-07-24 ENCOUNTER — APPOINTMENT (OUTPATIENT)
Dept: INFUSION THERAPY | Age: 57
End: 2017-07-24
Payer: MEDICARE

## 2017-07-26 DIAGNOSIS — C56.9 OVARIAN CANCER, UNSPECIFIED LATERALITY (HCC): Primary | ICD-10-CM

## 2017-07-26 RX ORDER — PEN NEEDLE, DIABETIC 31 GX5/16"
NEEDLE, DISPOSABLE MISCELLANEOUS
Qty: 200 PEN NEEDLE | Refills: 11 | Status: ON HOLD | OUTPATIENT
Start: 2017-07-26 | End: 2018-07-17

## 2017-07-27 ENCOUNTER — OFFICE VISIT (OUTPATIENT)
Dept: GYNECOLOGY | Age: 57
End: 2017-07-27

## 2017-07-27 VITALS
SYSTOLIC BLOOD PRESSURE: 118 MMHG | DIASTOLIC BLOOD PRESSURE: 65 MMHG | WEIGHT: 293 LBS | HEART RATE: 71 BPM | BODY MASS INDEX: 43.4 KG/M2 | HEIGHT: 69 IN

## 2017-07-27 DIAGNOSIS — Z79.4 CONTROLLED TYPE 2 DIABETES MELLITUS WITHOUT COMPLICATION, WITH LONG-TERM CURRENT USE OF INSULIN (HCC): ICD-10-CM

## 2017-07-27 DIAGNOSIS — E66.01 MORBID OBESITY DUE TO EXCESS CALORIES (HCC): ICD-10-CM

## 2017-07-27 DIAGNOSIS — E11.9 CONTROLLED TYPE 2 DIABETES MELLITUS WITHOUT COMPLICATION, WITH LONG-TERM CURRENT USE OF INSULIN (HCC): ICD-10-CM

## 2017-07-27 DIAGNOSIS — C80.0 CARCINOMATOSIS (HCC): Primary | ICD-10-CM

## 2017-07-27 RX ORDER — ASPIRIN 81 MG/1
TABLET ORAL DAILY
COMMUNITY
End: 2018-06-28 | Stop reason: ALTCHOICE

## 2017-07-27 NOTE — PROGRESS NOTES
[facility] [address], [address]  [phone]  MD Rashel Bustillos MD      Patient ID:  Name: Jose Neumann  MRN: [id number]  : 1960/53 y.o. Visit date: 2017     Acute Diagnosis:   Vaginal spotting   Persistent EOC    HISTORY:  49 y.o.  female with a diagnosis of ovarian cancer. Her original surgery: 12 Ex-lap, ROULA/BSO, radical tumor debulking. Pathology:  High grade serious ovarian cancer with omental cake and carcinomatosis. Stage IIIC. She received Taxol/carboplatin (10/2012 - 2013). She recurred in mid  noted CT with Peritoneal carcinomatosis with slightly increased size of the peritoneal implants throughout the abdomen, Pericardial lymph nodes unchanged and Bilateral pelvic adenopathy increased in size. Initiated retreatment with Taxol/carboplatin (2014-2014) with HSR. She was then monitored until progression, initiated on Doxil/Avastin (3/2014), did poorly, though MRI following cycle 3 showed extensive progression. She has receive Topotecan palliation. : 2017   108 units    Component      Latest Ref Rng & Units 2017 3/9/2017 2017 2017          10:17 AM  3:52 PM  3:00 PM  1:48 PM   CA-125      0 - 30 U/mL 115 (H) 181 (H)     Cancer Ag (CA) 125      0.0 - 38.1 U/mL   259.0 (H) 347.1 (H)     Component      Latest Ref Rng & Units 2016           1:37 PM  1:54 PM   CA-125      0 - 30 U/mL  265 (H)   Cancer Ag (CA) 125      0.0 - 38.1 U/mL 353.3 (H)        Recent initiation of IV Gemzar--#4     2017:  Recent [facility] admission for chest pain. Benign workup for cardiopulmonary/DVT pathology  Presents today in follow up and consideration of continuation of chemotherapy. Denies dysuria, hematuria. Urinary incontinence continues, vaginal discharge, N/V, F/C. Ongoing dental evaluation for ? ? infection.  Placed on antibiotics  Negative  and GI review for dysfunction  Negative cardiopulmonary review  No weight loss    PMH:  Past Medical History:   Diagnosis Date    Adverse effect of anesthesia     sleep apnea cpap machine useage     Anemia     Arthritis     DDD low back and knees    Asthma     uses albuterol prn only; none x 6 mos. Never hospitalized    BRCA negative 2013    Calculus of kidney     Chronic kidney disease     kidney stones    Chronic pain     knee pain bilat    CINV (chemotherapy-induced nausea and vomiting) 2014    Diabetes ([facility] [address] Age 46    IDDM    Environmental allergies     Fibromyalgia     GERD (gastroesophageal reflux disease)     controlled with med    Hypertension     Ill-defined condition     Sepsis     Ill-defined condition 2016    chemotherapy     Morbid obesity ([facility] [address]     Other and unspecified hyperlipidemia     Ovarian cancer ([facility] [address] 2012, 2014    serous, high grade, stage IIIc. s/p chemotx (has port)    Psychiatric disorder     Rectal bleeding     Thromboembolus ([facility] [address]     POST PARTUM; resolved- PELVIS    Thyroid disease     HYPOTHYROID    Unspecified sleep apnea     CPAP, Dr. Jermaine Jeong     PSH:  Past Surgical History:   Procedure Laterality Date    BREAST SURGERY PROCEDURE UNLISTED      Lymph node in left breast 2017    CHEST SURGERY PROCEDURE UNLISTED      Placement of port a cath right chest    HX GYN       X2    HX HYSTERECTOMY  2012    ROULA/BSO, tumor debulking, omentectomy for ovarian cancer    HX ORTHOPAEDIC      ankle-FX, R    HX ORTHOPAEDIC      FX R ARM    HX UROLOGICAL      stent    HX VASCULAR ACCESS  2015    right chest- port placed     Medications:  Current Outpatient Prescriptions on File Prior to Visit   Medication Sig Dispense Refill    BD INSULIN PEN NEEDLE UF SHORT 31 gauge x \" ndle USE WITH INSULIN TO INJECT FIVE TIMES DAILY.  200 Pen Needle 11    HYDROcodone-acetaminophen (NORCO) 5-325 mg per tablet Take 1 Tab by mouth every four (4) hours as needed for Pain. Max Daily Amount: 6 Tabs. 30 Tab 0    fluticasone (FLONASE) 50 mcg/actuation nasal spray       clonazePAM (KLONOPIN) 0.5 mg tablet Take 0.5 mg by mouth nightly as needed.  nystatin (MYCOSTATIN) powder Apply  to affected area four (4) times daily as needed.  OTHER,NON-FORMULARY, 2 Tabs daily (after dinner). [facility] (vitamin)      insulin aspart (NOVOLOG FLEXPEN) 100 unit/mL inpn INJECT 20 UNITS BEFORE EACH MEAL + 4 UNITS FOR EVERY 50 MG/DL ABOVE 150 MG/DL.  UNITS PER DAY 30 mL 11    SYMBICORT 160-4.5 mcg/actuation HFA inhaler 2 Puffs two (2) times daily as needed.  omega-3 acid ethyl esters (LOVAZA) 1 gram capsule TAKE ONE CAPSULE BY MOUTH TWICE A DAY  3    Liraglutide (VICTOZA) 0.6 mg/0.1 mL (18 mg/3 mL) sub-q pen 0.3 mL by SubCUTAneous route daily (with lunch). 3 Each 11    cycloSPORINE (RESTASIS) 0.05 % ophthalmic emulsion 1 Drop two (2) times a day.  multivitamin (ONE A DAY) tablet Take 2 Tabs by mouth daily (after dinner).  sertraline (ZOLOFT) 50 mg tablet TAKE 1 TABLET BY MOUTH EVERY MORNING  1    methocarbamol (ROBAXIN) 500 mg tablet Take 500 mg by mouth three (3) times daily as needed.  ondansetron (ZOFRAN ODT) 8 mg disintegrating tablet Take 1 Tab by mouth every eight (8) hours as needed for Nausea. 30 Tab 1    valsartan (DIOVAN) 160 mg tablet TAKE 1 TAB BY MOUTH DAILY. REPLACES VALSARTAN-HCTZ 30 Tab 11    LEVEMIR FLEXTOUCH 100 unit/mL (3 mL) inpn 30 UNITS IN THE MORNING AND 50 UNITS AT NIGHT. INCREASE AS DIRECTED TO MAX  UNITS PER DAY 30 mL 11    montelukast (SINGULAIR) 10 mg tablet Take 1 Tab by mouth daily. 90 Tab 2    levothyroxine (SYNTHROID) 150 mcg tablet TAKE 1 TAB BY MOUTH DAILY (BEFORE BREAKFAST). 90 Tab 3    fluticasone-salmeterol (ADVAIR HFA) 230-21 mcg/actuation inhaler Take 2 Puffs by inhalation two (2) times a day.  12 g 5    albuterol (PROVENTIL VENTOLIN) 2.5 mg /3 mL (0.083 %) nebulizer solution 3 mL by Nebulization route every four (4) hours as needed for Wheezing. 24 Each 11    potassium chloride SR (KLOR-CON 10) 10 mEq tablet Take 1 Tab by mouth two (2) days a week. (Patient taking differently: Take 10 mEq by mouth two (2) days a week. Monday and wednesdays) 30 Tab 1    cholecalciferol, VITAMIN D3, (VITAMIN D3) 5,000 unit tab tablet Take 5,000 Units by mouth daily.  furosemide (LASIX) 40 mg tablet Take 80 mg by mouth as needed. 3    albuterol (PROVENTIL HFA, VENTOLIN HFA, PROAIR HFA) 90 mcg/actuation inhaler Take 2 Puffs by inhalation every four (4) hours as needed for Wheezing.  fexofenadine (ALLEGRA) 180 mg tablet Take 180 mg by mouth daily. No current facility-administered medications on file prior to visit. Allergies   Allergen Reactions    Carboplatin Other (comments)     Patient developed shortness of breath, felt faint, coughing, skin flushed and \"itchy\". This occurred on the patients 8th treatment.  Iodinated Contrast- Oral And Iv Dye Rash    Lipitor [Atorvastatin] Myalgia    Percocet [Oxycodone-Acetaminophen] Other (comments)     fever    Shellfish Containing Products Hives    Tape [Adhesive] Itching and Other (comments)     \"op site-- clear,thin tape\"--caused blister      ROS:  Negative    OBJECTIVE:  Vitals: Loreto [place] Vitals:    07/27/17 0922   BP: 118/65   Pulse: 71   Weight: 317 lb 12.8 oz (144.2 kg)   Height: 5' 8.5\" (1.74 m)        Office Examination:    General appearance - alert, well appearing, and in no distress  Eyes - clear  Lymphatics - no palpable lymphadenopathy  Heart - normal rate and regular rhythm, S1 and S2 normal  Pulmonary - Clear to ausculation and percussion  Abdomen - rotund, soft, nontender, nondistended, no masses or organomegaly/fluid wave  Rectal - rectal exam not indicated  Back exam - no CVAT  Extrem: Mild upper extremity edema, L>R, good pulses.  Nontender      DATE REVIEW as available:  Lab Results   Component Value Date/Time    WBC 6.2 07/19/2017 05:15 AM    Hemoglobin (POC) 10.5 03/20/2017 12:15 PM    HGB 8.7 07/19/2017 05:15 AM    Hematocrit (POC) 31 03/20/2017 12:15 PM    HCT 27.6 07/19/2017 05:15 AM    PLATELET 926 01/67/7814 05:15 AM    MCV 91.1 07/19/2017 05:15 AM     Lab Results   Component Value Date/Time    Sodium 143 07/19/2017 05:15 AM    Potassium 4.5 07/19/2017 05:15 AM    Chloride 111 07/19/2017 05:15 AM    CO2 21 07/19/2017 05:15 AM    Anion gap 11 07/19/2017 05:15 AM    Glucose 166 07/19/2017 05:15 AM    BUN 29 07/19/2017 05:15 AM    Creatinine 1.39 07/19/2017 05:15 AM    BUN/Creatinine ratio 21 07/19/2017 05:15 AM    GFR est AA 48 07/19/2017 05:15 AM    GFR est non-AA 39 07/19/2017 05:15 AM    Calcium 8.5 07/19/2017 05:15 AM    Bilirubin, total 0.2 07/19/2017 05:15 AM    AST (SGOT) 24 07/19/2017 05:15 AM    Alk. phosphatase 87 07/19/2017 05:15 AM    Protein, total 8.0 07/19/2017 05:15 AM    Albumin 2.9 07/19/2017 05:15 AM    Globulin 5.1 07/19/2017 05:15 AM    A-G Ratio 0.6 07/19/2017 05:15 AM    ALT (SGPT) 22 07/19/2017 05:15 AM         IMPRESSION AND PLAN:    Saurabh Shaver has a working diagnosis of EOC. EOD CT and  elevations would be consistent with recurrence. She is S/P six cycles of Carbo/Taxol retreatment due to prolonged DFI. Elevated . CT suggestive of progressive disease. S/P Doxil/Avastin #3. S/P Topotecan #3, complicated by anemia and thrombocytopenia  S/P IV Gemzar#4,      Screening labs  Continue chemotherapy  Appointments scheduled.     Renetta Soto MD  7/27/2017

## 2017-07-28 RX ORDER — GRANISETRON HYDROCHLORIDE 1 MG/ML
1 INJECTION INTRAVENOUS ONCE
Status: COMPLETED | OUTPATIENT
Start: 2017-08-01 | End: 2017-08-01

## 2017-07-28 RX ORDER — GRANISETRON HYDROCHLORIDE 1 MG/ML
1 INJECTION, SOLUTION INTRAVENOUS ONCE
Status: DISCONTINUED | OUTPATIENT
Start: 2017-08-01 | End: 2017-07-28 | Stop reason: SDUPTHER

## 2017-07-29 LAB
ALBUMIN SERPL-MCNC: 3.9 G/DL (ref 3.5–5.5)
ALBUMIN/GLOB SERPL: 1 {RATIO} (ref 1.2–2.2)
ALP SERPL-CCNC: 83 IU/L (ref 39–117)
ALT SERPL-CCNC: 18 IU/L (ref 0–32)
AST SERPL-CCNC: 29 IU/L (ref 0–40)
BASOPHILS # BLD AUTO: 0 X10E3/UL (ref 0–0.2)
BASOPHILS NFR BLD AUTO: 0 %
BILIRUB SERPL-MCNC: 0.4 MG/DL (ref 0–1.2)
BUN SERPL-MCNC: 16 MG/DL (ref 6–24)
BUN/CREAT SERPL: 13 (ref 9–23)
CALCIUM SERPL-MCNC: 9.3 MG/DL (ref 8.7–10.2)
CANCER AG125 SERPL-ACNC: 157.4 U/ML (ref 0–38.1)
CHLORIDE SERPL-SCNC: 103 MMOL/L (ref 96–106)
CO2 SERPL-SCNC: 19 MMOL/L (ref 18–29)
CREAT SERPL-MCNC: 1.25 MG/DL (ref 0.57–1)
EOSINOPHIL # BLD AUTO: 0.2 X10E3/UL (ref 0–0.4)
EOSINOPHIL NFR BLD AUTO: 2 %
ERYTHROCYTE [DISTWIDTH] IN BLOOD BY AUTOMATED COUNT: 14.4 % (ref 12.3–15.4)
GLOBULIN SER CALC-MCNC: 3.8 G/DL (ref 1.5–4.5)
GLUCOSE SERPL-MCNC: 115 MG/DL (ref 65–99)
HCT VFR BLD AUTO: 30.6 % (ref 34–46.6)
HGB BLD-MCNC: 9.7 G/DL (ref 11.1–15.9)
IMM GRANULOCYTES # BLD: 0 X10E3/UL (ref 0–0.1)
IMM GRANULOCYTES NFR BLD: 0 %
LYMPHOCYTES # BLD AUTO: 2.2 X10E3/UL (ref 0.7–3.1)
LYMPHOCYTES NFR BLD AUTO: 27 %
MAGNESIUM SERPL-MCNC: 1.6 MG/DL (ref 1.6–2.3)
MCH RBC QN AUTO: 28.7 PG (ref 26.6–33)
MCHC RBC AUTO-ENTMCNC: 31.7 G/DL (ref 31.5–35.7)
MCV RBC AUTO: 91 FL (ref 79–97)
MONOCYTES # BLD AUTO: 0.7 X10E3/UL (ref 0.1–0.9)
MONOCYTES NFR BLD AUTO: 9 %
NEUTROPHILS # BLD AUTO: 4.8 X10E3/UL (ref 1.4–7)
NEUTROPHILS NFR BLD AUTO: 62 %
PLATELET # BLD AUTO: 196 X10E3/UL (ref 150–379)
POTASSIUM SERPL-SCNC: 4.1 MMOL/L (ref 3.5–5.2)
PROT SERPL-MCNC: 7.7 G/DL (ref 6–8.5)
RBC # BLD AUTO: 3.38 X10E6/UL (ref 3.77–5.28)
SODIUM SERPL-SCNC: 142 MMOL/L (ref 134–144)
WBC # BLD AUTO: 7.9 X10E3/UL (ref 3.4–10.8)

## 2017-08-01 ENCOUNTER — HOSPITAL ENCOUNTER (OUTPATIENT)
Dept: INFUSION THERAPY | Age: 57
Discharge: HOME | End: 2017-08-01
Payer: MEDICARE

## 2017-08-01 VITALS
WEIGHT: 293 LBS | OXYGEN SATURATION: 94 % | RESPIRATION RATE: 18 BRPM | HEIGHT: 68 IN | BODY MASS INDEX: 44.41 KG/M2 | DIASTOLIC BLOOD PRESSURE: 72 MMHG | HEART RATE: 62 BPM | SYSTOLIC BLOOD PRESSURE: 107 MMHG | TEMPERATURE: 97.1 F

## 2017-08-01 PROCEDURE — 74011250636 HC RX REV CODE- 250/636: Performed by: OBSTETRICS & GYNECOLOGY

## 2017-08-01 PROCEDURE — 77030012965 HC NDL HUBR BBMI -A

## 2017-08-01 PROCEDURE — 96413 CHEMO IV INFUSION 1 HR: CPT

## 2017-08-01 PROCEDURE — 96375 TX/PRO/DX INJ NEW DRUG ADDON: CPT

## 2017-08-01 RX ORDER — SODIUM CHLORIDE 0.9 % (FLUSH) 0.9 %
5-10 SYRINGE (ML) INJECTION AS NEEDED
Status: ACTIVE | OUTPATIENT
Start: 2017-08-01 | End: 2017-08-02

## 2017-08-01 RX ORDER — SODIUM CHLORIDE 9 MG/ML
10 INJECTION INTRAMUSCULAR; INTRAVENOUS; SUBCUTANEOUS AS NEEDED
Status: ACTIVE | OUTPATIENT
Start: 2017-08-01 | End: 2017-08-02

## 2017-08-01 RX ORDER — SODIUM CHLORIDE 9 MG/ML
25 INJECTION, SOLUTION INTRAVENOUS AS NEEDED
Status: DISPENSED | OUTPATIENT
Start: 2017-08-01 | End: 2017-08-02

## 2017-08-01 RX ORDER — HEPARIN 100 UNIT/ML
500 SYRINGE INTRAVENOUS AS NEEDED
Status: ACTIVE | OUTPATIENT
Start: 2017-08-01 | End: 2017-08-02

## 2017-08-01 RX ADMIN — GRANISETRON HYDROCHLORIDE 1 MG: 1 INJECTION INTRAVENOUS at 11:58

## 2017-08-01 RX ADMIN — Medication 10 ML: at 13:25

## 2017-08-01 RX ADMIN — SODIUM CHLORIDE 2112 MG: 900 INJECTION, SOLUTION INTRAVENOUS at 12:45

## 2017-08-01 RX ADMIN — SODIUM CHLORIDE 1000 ML: 900 INJECTION, SOLUTION INTRAVENOUS at 11:58

## 2017-08-01 RX ADMIN — SODIUM CHLORIDE 25 ML/HR: 900 INJECTION, SOLUTION INTRAVENOUS at 11:58

## 2017-08-01 RX ADMIN — Medication 500 UNITS: at 13:25

## 2017-08-01 NOTE — PROGRESS NOTES
[facility]fusion [facility] - Chemotherapy Progress Note     1115 Pt admit to [facility] for Gemzar/C5D1 and hydration ambulatory in stable condition. Assessment completed. No new concerns voiced. Port accessed with positive blood return. Labs drawn on 7/28 and reviewed. Line flushed, NS infusing.     Patient Vitals for the past 12 hrs:   Temp Pulse Resp BP SpO2   08/01/17 1326 97.1 °F (36.2 °C) 62 18 107/72 -   08/01/17 1113 97.4 °F (36.3 °C) 73 - 139/80 94 %        Medications:  NS 1 L Bolus  Kytril 1 mg  Gemzar       1330 Pt tolerated treatment well. Port maintained positive blood return throughout treatment, flushed with positive blood return at conclusion, heparinized and de-accessed.  D/c home ambulatory in no distress.

## 2017-08-02 ENCOUNTER — OFFICE VISIT (OUTPATIENT)
Dept: INTERNAL MEDICINE CLINIC | Age: 57
End: 2017-08-02

## 2017-08-02 VITALS
RESPIRATION RATE: 18 BRPM | TEMPERATURE: 97.5 F | SYSTOLIC BLOOD PRESSURE: 125 MMHG | HEART RATE: 66 BPM | BODY MASS INDEX: 44.41 KG/M2 | DIASTOLIC BLOOD PRESSURE: 78 MMHG | HEIGHT: 68 IN | OXYGEN SATURATION: 95 % | WEIGHT: 293 LBS

## 2017-08-02 DIAGNOSIS — Z13.1 SCREENING FOR DIABETES MELLITUS: ICD-10-CM

## 2017-08-02 DIAGNOSIS — C56.9 OVARIAN CANCER, UNSPECIFIED LATERALITY (HCC): ICD-10-CM

## 2017-08-02 DIAGNOSIS — Z11.59 NEED FOR HEPATITIS C SCREENING TEST: ICD-10-CM

## 2017-08-02 DIAGNOSIS — Z13.39 SCREENING FOR ALCOHOLISM: ICD-10-CM

## 2017-08-02 DIAGNOSIS — Z00.00 ROUTINE GENERAL MEDICAL EXAMINATION AT A HEALTH CARE FACILITY: Primary | ICD-10-CM

## 2017-08-02 DIAGNOSIS — R11.0 CHEMOTHERAPY-INDUCED NAUSEA: ICD-10-CM

## 2017-08-02 DIAGNOSIS — Z12.31 ENCOUNTER FOR SCREENING MAMMOGRAM FOR BREAST CANCER: ICD-10-CM

## 2017-08-02 DIAGNOSIS — Z13.31 SCREENING FOR DEPRESSION: ICD-10-CM

## 2017-08-02 DIAGNOSIS — T45.1X5A CHEMOTHERAPY-INDUCED NAUSEA: ICD-10-CM

## 2017-08-02 RX ORDER — ONDANSETRON 8 MG/1
8 TABLET, ORALLY DISINTEGRATING ORAL
Qty: 30 TAB | Refills: 3 | Status: SHIPPED | OUTPATIENT
Start: 2017-08-02 | End: 2018-11-27

## 2017-08-02 NOTE — PROGRESS NOTES
Chief Complaint   Patient presents with   [facility] Annual Wellness Visit     1. Have you been to the ER, urgent care clinic since your last visit? Hospitalized since your last visit? Yes, [address]     2. Have you seen or consulted any other health care providers outside of the [address] [place] [address] since your last visit? Include any pap smears or colon screening. No    In the event something were to happen to you and you were unable to speak on your behalf, do you have an Advance Directive/ Living Will in place stating your wishes? Yes    If yes, do we have a copy on file: Yes    If no, would you like information: N/A       Reviewed record In preparation for visit and have obtained necessary documentation.

## 2017-08-02 NOTE — PATIENT INSTRUCTIONS

## 2017-08-02 NOTE — PROGRESS NOTES
This is a Subsequent Medicare Annual Wellness Visit providing Personalized Prevention Plan Services (PPPS) (Performed 12 months after initial AWV and PPPS )    I have reviewed the patient's medical history in detail and updated the computerized patient record. History     Past Medical History:   Diagnosis Date    Adverse effect of anesthesia     sleep apnea cpap machine useage     Anemia     Arthritis     DDD low back and knees    Asthma     uses albuterol prn only; none x 6 mos.   Never hospitalized    BRCA negative 2013    Calculus of kidney     Chronic kidney disease     kidney stones    Chronic pain     knee pain bilat    CINV (chemotherapy-induced nausea and vomiting) 2014    Diabetes ([address] Age 46    IDDM    Environmental allergies     Fibromyalgia     GERD (gastroesophageal reflux disease)     controlled with med    Hypertension     Ill-defined condition     Sepsis     Ill-defined condition 2016    chemotherapy     Morbid obesity ([address]     Other and unspecified hyperlipidemia     Ovarian cancer ([address] 2012, 2014    serous, high grade, stage IIIc. s/p chemotx (has port)    Psychiatric disorder     Rectal bleeding     Thromboembolus ([address]     POST PARTUM; resolved- PELVIS    Thyroid disease     HYPOTHYROID    Unspecified sleep apnea     CPAP, Dr. Bart Patel      Past Surgical History:   Procedure Laterality Date    BREAST SURGERY PROCEDURE UNLISTED      Lymph node in left breast 2017    CHEST SURGERY PROCEDURE UNLISTED      Placement of port a cath right chest    HX GYN       X2    HX HYSTERECTOMY  2012    ROULA/BSO, tumor debulking, omentectomy for ovarian cancer    HX ORTHOPAEDIC      ankle-FX, R    HX ORTHOPAEDIC      FX R ARM    HX UROLOGICAL      stent    HX VASCULAR ACCESS  2015    right chest- port placed     Current Outpatient Prescriptions   Medication Sig Dispense Refill    ondansetron (ZOFRAN ODT) 8 mg disintegrating tablet Take 1 Tab by mouth every eight (8) hours as needed for Nausea. 30 Tab 3    aspirin delayed-release 81 mg tablet Take  by mouth daily.  BD INSULIN PEN NEEDLE UF SHORT 31 gauge x 5/16\" ndle USE WITH INSULIN TO INJECT FIVE TIMES DAILY. 200 Pen Needle 11    HYDROcodone-acetaminophen (NORCO) 5-325 mg per tablet Take 1 Tab by mouth every four (4) hours as needed for Pain. Max Daily Amount: 6 Tabs. 30 Tab 0    fluticasone (FLONASE) 50 mcg/actuation nasal spray       clonazePAM (KLONOPIN) 0.5 mg tablet Take 0.5 mg by mouth nightly as needed.  nystatin (MYCOSTATIN) powder Apply  to affected area four (4) times daily as needed.  OTHER,NON-FORMULARY, 2 Tabs daily (after dinner). [facility] (vitamin)      insulin aspart (NOVOLOG FLEXPEN) 100 unit/mL inpn INJECT 20 UNITS BEFORE EACH MEAL + 4 UNITS FOR EVERY 50 MG/DL ABOVE 150 MG/DL.  UNITS PER DAY 30 mL 11    SYMBICORT 160-4.5 mcg/actuation HFA inhaler 2 Puffs two (2) times daily as needed.  omega-3 acid ethyl esters (LOVAZA) 1 gram capsule TAKE ONE CAPSULE BY MOUTH TWICE A DAY  3    Liraglutide (VICTOZA) 0.6 mg/0.1 mL (18 mg/3 mL) sub-q pen 0.3 mL by SubCUTAneous route daily (with lunch). 3 Each 11    cycloSPORINE (RESTASIS) 0.05 % ophthalmic emulsion 1 Drop two (2) times a day.  multivitamin (ONE A DAY) tablet Take 2 Tabs by mouth daily (after dinner).  sertraline (ZOLOFT) 50 mg tablet TAKE 1 TABLET BY MOUTH EVERY MORNING  1    methocarbamol (ROBAXIN) 500 mg tablet Take 500 mg by mouth three (3) times daily as needed.  LEVEMIR FLEXTOUCH 100 unit/mL (3 mL) inpn 30 UNITS IN THE MORNING AND 50 UNITS AT NIGHT. INCREASE AS DIRECTED TO MAX  UNITS PER DAY 30 mL 11    montelukast (SINGULAIR) 10 mg tablet Take 1 Tab by mouth daily. 90 Tab 2    levothyroxine (SYNTHROID) 150 mcg tablet TAKE 1 TAB BY MOUTH DAILY (BEFORE BREAKFAST).  90 Tab 3    fluticasone-salmeterol (ADVAIR HFA) 230-21 mcg/actuation inhaler Take 2 Puffs by inhalation two (2) times a day. 12 g 5    albuterol (PROVENTIL VENTOLIN) 2.5 mg /3 mL (0.083 %) nebulizer solution 3 mL by Nebulization route every four (4) hours as needed for Wheezing. 24 Each 11    potassium chloride SR (KLOR-CON 10) 10 mEq tablet Take 1 Tab by mouth two (2) days a week. (Patient taking differently: Take 10 mEq by mouth two (2) days a week. Monday and wednesdays) 30 Tab 1    cholecalciferol, VITAMIN D3, (VITAMIN D3) 5,000 unit tab tablet Take 5,000 Units by mouth daily.  furosemide (LASIX) 40 mg tablet Take 80 mg by mouth as needed. 3    albuterol (PROVENTIL HFA, VENTOLIN HFA, PROAIR HFA) 90 mcg/actuation inhaler Take 2 Puffs by inhalation every four (4) hours as needed for Wheezing.  fexofenadine (ALLEGRA) 180 mg tablet Take 180 mg by mouth daily. Allergies   Allergen Reactions    Carboplatin Other (comments)     Patient developed shortness of breath, felt faint, coughing, skin flushed and \"itchy\". This occurred on the patients 8th treatment.     Iodinated Contrast- Oral And Iv Dye Rash    Lipitor [Atorvastatin] Myalgia    Percocet [Oxycodone-Acetaminophen] Other (comments)     fever    Shellfish Containing Products Hives    Tape [Adhesive] Itching and Other (comments)     \"op site-- clear,thin tape\"--caused blister      Family History   Problem Relation Age of Onset    Hypertension Mother     Arthritis-osteo Mother     Lung Disease Father      COPD    Hypertension Father     Arthritis-osteo Father     Hypertension Brother     Elevated Lipids Brother     Arthritis-osteo Brother     Hypertension Brother     Elevated Lipids Brother     Obesity Brother     Cancer Brother      PROSTATE    Anesth Problems Son      DELAYED AWAKENING    Diabetes Maternal Grandmother      Social History   Substance Use Topics    Smoking status: Never Smoker    Smokeless tobacco: Never Used    Alcohol use No     Patient Active Problem List   Diagnosis Code    Histor of ovarian cancer Z85.43    Carcinomatosis ([facility] C80.0    Controlled type 2 diabetes mellitus without complication, with long-term current use of insulin (HCC) E11.9, Z79.4    Morbid obesity ([facility] E66.01    Abnormal mammogram R92.8    Sleep apnea G47.30    Dyslipidemia (high LDL; low HDL) E78.4    Benign essential hypertension I10    Disorder of thyroid E07.9    CINV (chemotherapy-induced nausea and vomiting) R11.2, T45.1X5A    Swelling of eye, bilateral--left > right H57.8    Portacath in place--left Z95.828    Chest pain, atypical R07.89    Hx of pulmonary embolus during pregnancy Z86.711, Z87.59    Elevated CA-125 R97.1    Ovarian cancer (HCC) C56.9    Hyperglycemia due to type 2 diabetes mellitus ([facility] E11.65    Preop cardiovascular exam Z01.810    Angina pectoris associated with type 2 diabetes mellitus (HCC) E11.59, I20.9    ARF (acute renal failure) (HCC) N17.9    Renal calculi N20.0    Elevated BUN R79.9    Pulmonary hypertension, mild (HCC) I27.2    UTI (urinary tract infection) N39.0    Wheezing R06.2    Lymphadenopathy, mediastinal R59.0       Depression Risk Factor Screening:     PHQ over the last two weeks 8/2/2017   PHQ Not Done Active Diagnosis of Depression or Bipolar Disorder   Little interest or pleasure in doing things -   Feeling down, depressed or hopeless -   Total Score PHQ 2 -     Alcohol Risk Factor Screening: You do not drink alcohol or very rarely. Functional Ability and Level of Safety:     Hearing Loss   Hearing is good. Activities of Daily Living   Self-care. Requires assistance with: no ADLs    Fall Risk   Fall Risk Assessment, last 12 mths 8/2/2017   Able to walk? Yes   Fall in past 12 months? No     Abuse Screen   Patient is not abused    Review of Systems   A comprehensive review of systems was negative.     Physical Examination     Evaluation of Cognitive Function:  Mood/affect:  happy  Appearance: age appropriate  Family member/caregiver input: none    No exam performed during AWV, completed as part of f/u evaluation today. Patient Care Team:  Misha Harden MD as PCP - General (Family Practice)  Tana Ly MD (Gastroenterology)  Robert Reyna MD (Ophthalmology)  Trina Mitchell MD (Endocrinology)  Fay Francisco MD (Obstetrics & Gynecology)  Nemesio Castañeda RN as Ambulatory Care Navigator  Omayra Moise MD (Pulmonary Disease)    Advice/Referrals/Counseling   Education and counseling provided:  Are appropriate based on today's review and evaluation      Assessment/Plan       Encounter Diagnoses   Name Primary?  Routine general medical examination at a health care facility Yes    Screening for depression        1. ADL's and support. Patient lives independently at home. Lives in an .  States that  has friends and family nearby for support when needed. ADL Assessment 8/1/2016   Feeding yourself No Help Needed   Getting from bed to chair No Help Needed   Getting dressed No Help Needed   Bathing or showering No Help Needed   Walk across the room (includes cane/walker) No Help Needed   Using the telphone No Help Needed   Taking your medications No Help Needed   Preparing meals No Help Needed   Managing money (expenses/bills) No Help Needed   Moderately strenuous housework (laundry) No Help Needed   Shopping for personal items (toiletries/medicines) No Help Needed   Shopping for groceries No Help Needed   Driving No Help Needed   Climbing a flight of stairs No Help Needed   Getting to places beyond walking distances No Help Needed      2.  Cardiovascular blood tests. Last lipid panel  2/2017  3. Colorectal cancer screening. Last colonoscopy with Dr. Jeffries Courser. She will f/u with her gastroenterologist.     4.  Diabetes screening tests. .  Repeat A1c pended for Dr. Mo Kim.     5.  Glaucoma screenings.       6. Flu vaccine. Last done fall 2016     7. Pneumonia vaccine. Declines pneumonia vaccines at this time.     8.   Prostate-  N/A     9. Advance care planning. Patient confirmed that advance medical directive in chart is up to date.     10. Bone density. DEXA will be discussed at her next visit.       11. Mammography. Mammogram ordered today.     12. Other immunizations.      Brief history and med reconciliation completed by MA/LPN.   Reviewed by MD.

## 2017-08-02 NOTE — PROGRESS NOTES
SUBJECTIVE:   Ms. Edna Digsg is a 65 y.o. female who is here for follow up of ED visit 7/18/2017. Pt was seen at the ED 7/18/2017 for chest pressure. Pt was at gyn office when she began experiencing SOB and chest pressure. Pt was taken from gyn to ED at that time. Pt reports beginning chemo treatments again. Pt states Dr. Neo Ybarra (gyn/oncology) wants to do a series of 5 treatments with a different medicine than before. Pt received chemo treatment yesterday, noting she was nauseated last night because she forgot to ask for the anti-nausea pills. Pt reports occasional hearing changes in left ear due to chemo. Pt reports kidney function has decreased due to chemo. Pt reports BP has been dropping and causing her to feel \"woozy\". Pt reports recent dental work. Pt reports right knee pain. Pt states Dr. Wilda Clemons (orthopedics) gave her a brace for the right knee and wants her to loose some more weight before having surgery. Pt endorses regular exercise in the pool and with use of a recumbent bike. Pt states Dr. Josee Mccray (endocrinology) advised her she no longer needs to see him and can f/u with PCP. Pt reports DM has been well controlled by diet changes, including incorporation of vegetables and fruits. Pt states she will see Dr. Bradly Chambers 9/27/2017. Pt reports she is to f/u with Dr. Nevin Diane (urology) as needed for kidney stones. At this time, she is otherwise doing well and has brought no other complaints to my attention today. For a list of the medical issues addressed today, see the assessment and plan below. PMH:   Past Medical History:   Diagnosis Date    Adverse effect of anesthesia     sleep apnea cpap machine useage     Anemia     Arthritis     DDD low back and knees    Asthma     uses albuterol prn only; none x 6 mos.   Never hospitalized    BRCA negative 1/2013    Calculus of kidney     Chronic kidney disease     kidney stones    Chronic pain     knee pain bilat    CINV (chemotherapy-induced nausea and vomiting) 8/21/2014    Diabetes ([facility] Age 46    IDDM    Environmental allergies     Fibromyalgia     GERD (gastroesophageal reflux disease)     controlled with med    Hypertension     Ill-defined condition     Sepsis 9-2015    Ill-defined condition 2016    chemotherapy     Morbid obesity ([facility]     Other and unspecified hyperlipidemia     Ovarian cancer ([facility] 9/2012, 1/2014    serous, high grade, stage IIIc. s/p chemotx (has port)    Psychiatric disorder     Rectal bleeding     Thromboembolus ([facility] 1993    POST PARTUM; resolved- PELVIS    Thyroid disease     HYPOTHYROID    Unspecified sleep apnea     CPAP, Dr. Manohar Del Toro     PSH:  has a past surgical history that includes gyn; hysterectomy (9/2012); vascular access (11/4/2015); breast surgery procedure unlisted; orthopaedic; orthopaedic; chest surgery procedure unlisted; and urological.    All: is allergic to carboplatin; iodinated contrast- oral and iv dye; lipitor [atorvastatin]; percocet [oxycodone-acetaminophen]; shellfish containing products; and tape [adhesive]. MEDS:   Current Outpatient Prescriptions   Medication Sig    ondansetron (ZOFRAN ODT) 8 mg disintegrating tablet Take 1 Tab by mouth every eight (8) hours as needed for Nausea.  aspirin delayed-release 81 mg tablet Take  by mouth daily.  BD INSULIN PEN NEEDLE UF SHORT 31 gauge x 5/16\" ndle USE WITH INSULIN TO INJECT FIVE TIMES DAILY.  HYDROcodone-acetaminophen (NORCO) 5-325 mg per tablet Take 1 Tab by mouth every four (4) hours as needed for Pain. Max Daily Amount: 6 Tabs.  fluticasone (FLONASE) 50 mcg/actuation nasal spray     clonazePAM (KLONOPIN) 0.5 mg tablet Take 0.5 mg by mouth nightly as needed.  nystatin (MYCOSTATIN) powder Apply  to affected area four (4) times daily as needed.  OTHER,NON-FORMULARY, 2 Tabs daily (after dinner).  [facility] (vitamin)    insulin aspart (NOVOLOG FLEXPEN) 100 unit/mL inpn INJECT 20 UNITS BEFORE EACH MEAL + 4 UNITS FOR EVERY 50 MG/DL ABOVE 150 MG/DL.  UNITS PER DAY    SYMBICORT 160-4.5 mcg/actuation HFA inhaler 2 Puffs two (2) times daily as needed.  omega-3 acid ethyl esters (LOVAZA) 1 gram capsule TAKE ONE CAPSULE BY MOUTH TWICE A DAY    Liraglutide (VICTOZA) 0.6 mg/0.1 mL (18 mg/3 mL) sub-q pen 0.3 mL by SubCUTAneous route daily (with lunch).  cycloSPORINE (RESTASIS) 0.05 % ophthalmic emulsion 1 Drop two (2) times a day.  multivitamin (ONE A DAY) tablet Take 2 Tabs by mouth daily (after dinner).  sertraline (ZOLOFT) 50 mg tablet TAKE 1 TABLET BY MOUTH EVERY MORNING    methocarbamol (ROBAXIN) 500 mg tablet Take 500 mg by mouth three (3) times daily as needed.  LEVEMIR FLEXTOUCH 100 unit/mL (3 mL) inpn 30 UNITS IN THE MORNING AND 50 UNITS AT NIGHT. INCREASE AS DIRECTED TO MAX  UNITS PER DAY    montelukast (SINGULAIR) 10 mg tablet Take 1 Tab by mouth daily.  levothyroxine (SYNTHROID) 150 mcg tablet TAKE 1 TAB BY MOUTH DAILY (BEFORE BREAKFAST).  fluticasone-salmeterol (ADVAIR HFA) 230-21 mcg/actuation inhaler Take 2 Puffs by inhalation two (2) times a day.  albuterol (PROVENTIL VENTOLIN) 2.5 mg /3 mL (0.083 %) nebulizer solution 3 mL by Nebulization route every four (4) hours as needed for Wheezing.  potassium chloride SR (KLOR-CON 10) 10 mEq tablet Take 1 Tab by mouth two (2) days a week. (Patient taking differently: Take 10 mEq by mouth two (2) days a week. Monday and wednesdays)    cholecalciferol, VITAMIN D3, (VITAMIN D3) 5,000 unit tab tablet Take 5,000 Units by mouth daily.  furosemide (LASIX) 40 mg tablet Take 80 mg by mouth as needed.  albuterol (PROVENTIL HFA, VENTOLIN HFA, PROAIR HFA) 90 mcg/actuation inhaler Take 2 Puffs by inhalation every four (4) hours as needed for Wheezing.  fexofenadine (ALLEGRA) 180 mg tablet Take 180 mg by mouth daily. No current facility-administered medications for this visit.         FH: family history includes Anesth Problems in her son; Arthritis-osteo in her brother, father, and mother; Cancer in her brother; Diabetes in her maternal grandmother; Elevated Lipids in her brother and brother; Hypertension in her brother, brother, father, and mother; Lung Disease in her father; Obesity in her brother. SH:  reports that she has never smoked. She has never used smokeless tobacco. She reports that she uses illicit drugs, including Prescription and OTC. She reports that she does not drink alcohol. Review of Systems - History obtained from the patient  General ROS: negative  Psychological ROS: negative  Ophthalmic ROS: negative  ENT ROS: negative  Respiratory ROS: no cough, shortness of breath, or wheezing  Cardiovascular ROS: no chest pain or dyspnea on exertion  Gastrointestinal ROS: no abdominal pain, change in bowel habits, or black or bloody stools  Genito-Urinary ROS: negative  Musculoskeletal ROS: negative  Neurological ROS: negative  Dermatological ROS: negative    OBJECTIVE:   Vitals:   Visit Vitals    /78 (BP 1 Location: Left arm, BP Patient Position: Sitting)    Pulse 66    Temp 97.5 °F (36.4 °C) (Oral)    Resp 18    Ht 5' 8\" (1.727 m)    Wt 317 lb 12.8 oz (144.2 kg)    LMP 09/07/2011    SpO2 95%    BMI 48.32 kg/m2      Gen: Pleasant 65 y.o.  female in NAD. HEENT: NC/AT. HEART: RRR, No M/G/R. LUNGS: CTAB No W/R. EXTREMITIES: Warm. No C/C/E. NEURO: Alert and oriented x 3. Cranial nerves grossly intact. No focal sensory or motor deficits noted. SKIN: Warm. Dry. No rashes or other lesions noted. ASSESSMENT/ PLAN:     Diagnoses and all orders for this visit:    1. Routine general medical examination at a health care facility  -     Annual  Alcohol Screen 15 min ()  -     Depression Screen Annual  -     HEPATITIS C AB    2. Chemotherapy-induced nausea  -     ondansetron (ZOFRAN ODT) 8 mg disintegrating tablet; Take 1 Tab by mouth every eight (8) hours as needed for Nausea.   -     [facility] 3D BRAD W MAMMO BI SCREENING INCL CAD; Future    3. Ovarian cancer, unspecified laterality ([address]  -     [facility] 3D BRAD W MAMMO BI SCREENING INCL CAD; Future    4. Encounter for screening mammogram for breast cancer  -     [facility] 3D BRAD W MAMMO BI SCREENING INCL CAD; Future    5. Screening for alcoholism  -     Annual  Alcohol Screen 15 min ()    6. Screening for depression  -     Depression Screen Annual    7. Screening for diabetes mellitus    8. Need for hepatitis C screening test      I prescribed Zofran ODT 8mg every 8 hours PRN for management of chemotherapy-induced nausea. I ordered a mammogram for routine breast cancer screening. I advised pt to discontinue Diovan due to \"wooziness\" from BP drops. I ordered a Hep C lab for routine Hepatitis C screening. Pt had her annual medicare wellness exam today. Pt will f/u in one month. Follow-up Disposition:  Return in about 1 month (around 9/2/2017) for follow up. I have reviewed the patient's medications and risks/side effects/benefits were discussed. Diagnosis(-es) explained to patient and questions answered. Literature provided where appropriate.      Written by Cathi Love, as dictated by Pedro Bangura MD.

## 2017-08-02 NOTE — MR AVS SNAPSHOT
Visit Information Date & Time Provider Department Dept. Phone Encounter #  
 8/2/2017  3:30 PM Marcina Mortimer, [address] [phone] [id number] Follow-up Instructions Return in about 1 month (around 9/2/2017) for follow up. Your Appointments 8/24/2017 10:30 AM  
CHEMOTHERAPY with Juan Arcos MD  
[facility] Gynecologic [facility]) Appt Note: labs today/for c6d1 gemzar  bremo opic/angeli/l[address] [phone]  
[address] [place] 9/14/2017  8:50 AM  
Follow Up with Ya Barber MD  
[facility]) Appt Note: 6 month f/u  
 [address] [facility] [address] [id number] [address] [place] [address]  
  
    
 9/27/2017  9:00 AM  
ESTABLISHED PATIENT with Penelope Olvera MD  
[facility]) Appt Note: 6 MO. F/U  
 [address] [place] [address]  
[phone] [address]  
  
    
 2/15/2018  9:00 AM  
ESTABLISHED PATIENT with Penelope Olvera, [address] [facility]) Appt Note: 1 YR F/U  
 [address] [place] [address]  
[phone] Upcoming Health Maintenance Date Due Hepatitis C Screening 1960 Pneumococcal 19-64 Highest Risk (1 of 3 - PCV13) 7/28/1980 DTaP/Tdap/Td series (1 - Tdap) 7/28/1982 COLONOSCOPY 10/14/2016 MEDICARE YEARLY EXAM 10/15/2016 INFLUENZA AGE 9 TO ADULT 8/1/2017 PAP AKA CERVICAL CYTOLOGY 8/29/2017 FOOT EXAM Q1 9/2/2017 EYE EXAM RETINAL OR DILATED Q1 11/15/2017 HEMOGLOBIN A1C Q6M 1/19/2018 MICROALBUMIN Q1 2/27/2018 LIPID PANEL Q1 2/27/2018 BREAST CANCER SCRN MAMMOGRAM 9/22/2018 Allergies as of 8/2/2017  Review Complete On: 8/2/2017 By: Alexis Davis LPN Severity Noted Reaction Type Reactions Carboplatin  08/29/2014    Other (comments) Patient developed shortness of breath, felt faint, coughing, skin flushed and \"itchy\". This occurred on the patients 8th treatment. Iodinated Contrast- Oral And Iv Dye  03/25/2012   Topical Rash Lipitor [Atorvastatin]  04/04/2014    Myalgia Percocet [Oxycodone-acetaminophen]  03/25/2012   Not Verified Other (comments) fever Shellfish Containing Products  10/04/2012    Hives Tape [Adhesive]  03/16/2017    Itching, Other (comments) \"op site-- clear,thin tape\"--caused blister Current Immunizations  Reviewed on 8/1/2017 Name Date Influenza Vaccine (Quad) PF 12/13/2016 Not reviewed this visit You Were Diagnosed With   
  
 Codes Comments Routine general medical examination at a health care facility    -  Primary ICD-10-CM: Z00.00 ICD-9-CM: V70.0 Chemotherapy-induced nausea     ICD-10-CM: R11.0, T45.1X5A 
ICD-9-CM: 787.02, E933.1 Ovarian cancer, unspecified laterality [facility]     ICD-10-CM: C56.9 ICD-9-CM: 183.0 Encounter for screening mammogram for breast cancer     ICD-10-CM: Z12.31 
ICD-9-CM: V76.12 Screening for alcoholism     ICD-10-CM: Z13.89 ICD-9-CM: V79.1 Screening for depression     ICD-10-CM: Z13.89 ICD-9-CM: V79.0 Screening for diabetes mellitus     ICD-10-CM: Z13.1 ICD-9-CM: V77.1 Need for hepatitis C screening test     ICD-10-CM: Z11.59 
ICD-9-CM: V73.89 Vitals BP Pulse Temp Resp Height(growth percentile) Weight(growth percentile) 125/78 (BP 1 Location: Left arm, BP Patient Position: Sitting) 66 97.5 °F (36.4 °C) (Oral) 18 5' 8\" (1.727 m) 317 lb 12.8 oz (144.2 kg) LMP SpO2 BMI OB Status Smoking Status 09/07/2011 95% 48.32 kg/m2 Hysterectomy Never Smoker Vitals History BMI and BSA Data Body Mass Index Body Surface Area  
 48.32 kg/m 2 2.63 m 2 Preferred Pharmacy Pharmacy Name Phone [id number] [place] [id number] [facility] [phone] Your Updated Medication List  
  
   
This list is accurate as of: 8/2/17  4:27 PM.  Always use your most recent med list.  
  
  
  
  
 * albuterol 90 mcg/actuation inhaler Commonly known as:  PROVENTIL HFA, VENTOLIN HFA, PROAIR HFA Take 2 Puffs by inhalation every four (4) hours as needed for Wheezing. * albuterol 2.5 mg /3 mL (0.083 %) nebulizer solution Commonly known as:  PROVENTIL VENTOLIN  
3 mL by Nebulization route every four (4) hours as needed for Wheezing. ALLEGRA 180 mg tablet Generic drug:  fexofenadine Take 180 mg by mouth daily. aspirin delayed-release 81 mg tablet Take  by mouth daily. BD INSULIN PEN NEEDLE UF SHORT 31 gauge x 5/16\" Ndle Generic drug:  Insulin Needles (Disposable) USE WITH INSULIN TO INJECT FIVE TIMES DAILY. cholecalciferol (VITAMIN D3) 5,000 unit Tab tablet Commonly known as:  VITAMIN D3 Take 5,000 Units by mouth daily. clonazePAM 0.5 mg tablet Commonly known as:  Milo Manzanilla Take 0.5 mg by mouth nightly as needed. fluticasone 50 mcg/actuation nasal spray Commonly known as:  FLONASE  
  
 fluticasone-salmeterol 230-21 mcg/actuation inhaler Commonly known as:  ADVAIR HFA Take 2 Puffs by inhalation two (2) times a day. furosemide 40 mg tablet Commonly known as:  LASIX Take 80 mg by mouth as needed. HYDROcodone-acetaminophen 5-325 mg per tablet Commonly known as:  Bright Collar Take 1 Tab by mouth every four (4) hours as needed for Pain. Max Daily Amount: 6 Tabs. insulin aspart 100 unit/mL Inpn Commonly known as:  Yanni Hammonds INJECT 20 UNITS BEFORE EACH MEAL + 4 UNITS FOR EVERY 50 MG/DL ABOVE 150 MG/DL.   UNITS PER DAY  
  
 LEVEMIR FLEXTOUCH 100 unit/mL (3 mL) Inpn Generic drug:  insulin detemir 30 UNITS IN THE MORNING AND 50 UNITS AT NIGHT. INCREASE AS DIRECTED TO MAX  UNITS PER DAY  
  
 levothyroxine 150 mcg tablet Commonly known as:  SYNTHROID  
TAKE 1 TAB BY MOUTH DAILY (BEFORE BREAKFAST). Liraglutide 0.6 mg/0.1 mL (18 mg/3 mL) sub-q pen Commonly known as:  VICTOZA  
0.3 mL by SubCUTAneous route daily (with lunch). methocarbamol 500 mg tablet Commonly known as:  ROBAXIN Take 500 mg by mouth three (3) times daily as needed. montelukast 10 mg tablet Commonly known as:  SINGULAIR Take 1 Tab by mouth daily. multivitamin tablet Commonly known as:  ONE A DAY Take 2 Tabs by mouth daily (after dinner). nystatin powder Commonly known as:  MYCOSTATIN Apply  to affected area four (4) times daily as needed. omega-3 acid ethyl esters 1 gram capsule Commonly known as:  Villegas Tiffanie TAKE ONE CAPSULE BY MOUTH TWICE A DAY  
  
 ondansetron 8 mg disintegrating tablet Commonly known as:  ZOFRAN ODT Take 1 Tab by mouth every eight (8) hours as needed for Nausea. OTHER(NON-FORMULARY) 2 Tabs daily (after dinner). [facility] (vitamin) potassium chloride SR 10 mEq tablet Commonly known as:  KLOR-CON 10 Take 1 Tab by mouth two (2) days a week. RESTASIS 0.05 % ophthalmic emulsion Generic drug:  cycloSPORINE  
1 Drop two (2) times a day. sertraline 50 mg tablet Commonly known as:  ZOLOFT  
TAKE 1 TABLET BY MOUTH EVERY MORNING  
  
 SYMBICORT 160-4.5 mcg/actuation HFA inhaler Generic drug:  budesonide-formoterol 2 Puffs two (2) times daily as needed. * Notice: This list has 2 medication(s) that are the same as other medications prescribed for you. Read the directions carefully, and ask your doctor or other care provider to review them with you. Prescriptions Printed Refills ondansetron (ZOFRAN ODT) 8 mg disintegrating tablet 3 Sig: Take 1 Tab by mouth every eight (8) hours as needed for Nausea. Class: Print Route: Oral  
  
We Performed the Following [address] [[id number] [facility]] HEPATITIS C AB [25032 CPT(R)] MI ANNUAL ALCOHOL SCREEN 15 MIN [id number] [facility]] Follow-up Instructions Return in about 1 month (around 9/2/2017) for follow up. To-Do List   
 08/02/2017 Imaging:  [facility] 3D [facility] W MAMMO BI SCREENING INCL CAD   
  
 08/08/2017 10:00 AM  
  Appointment with [facility] at [facility] ([phone])  
  
 08/29/2017 11:00 AM  
  Appointment with [facility] ([phone])  
  
 09/06/2017 11:00 AM  
  Appointment with [address] ([phone]) Patient Instructions Medicare Wellness Visit, Female The best way to live healthy is to have a healthy lifestyle by eating a well-balanced diet, exercising regularly, limiting alcohol and stopping smoking. Regular physical exams and screening tests are another way to keep healthy. Preventive exams provided by your health care provider can find health problems before they become diseases or illnesses. Preventive services including immunizations, screening tests, monitoring and exams can help you take care of your own health. All people over age 72 should have a pneumovax  and and a prevnar shot to prevent pneumonia. These are once in a lifetime unless you and your provider decide differently. All people over 65 should have a yearly flu shot and a tetanus vaccine every 10 years. A bone mass density to screen for osteoporosis or thinning of the bones should be done every 2 years after 65. Screening for diabetes mellitus with a blood sugar test should be done every year.  
 
Glaucoma is a disease of the eye due to increased ocular pressure that can lead to blindness and it should be done every year by an eye professional. 
 
Cardiovascular screening tests that check for elevated lipids (fatty part of blood) which can lead to heart disease and strokes should be done every 5 years. Colorectal screening that evaluates for blood or polyps in your colon should be done yearly as a stool test or every five years as a flexible sigmoidoscope or every 10 years as a colonoscopy up to age 76. Breast cancer screening with a mammogram is recommended biennially  for women age 54-69. Screening for cervical cancer with a pap smear and pelvic exam is recommended for women after age 72 years every 2 years up to age 79 or when the provider and patient decide to stop. If there is a history of cervical abnormalities or other increased risk for cancer then the test is recommended yearly. Hepatitis C screening is also recommended for anyone born between [birth year] through [address]. A shingles vaccine is also recommended once in a lifetime after age 61. Your Medicare Wellness Exam is recommended annually. Here is a list of your current Health Maintenance items with a due date: 
Health Maintenance Due Topic Date Due  
 Hepatitis C Test  1960  Pneumococcal Vaccine (1 of 3 - PCV13) 07/28/1980  
 DTaP/Tdap/Td  (1 - Tdap) 07/28/1982  Colonoscopy  10/14/2016 [address] [place] [address] Annual Well Visit  10/15/2016  Flu Vaccine  08/01/2017  Cervical Cancer Screening  08/29/2017  Diabetic Foot Care  09/02/2017 \[facility]\"" & HEALTH [facility]! Dear [address]: Thank you for requesting a [employer] account. Our records indicate that you already have an active [employer] account. You can access your account anytime at [email or web address] Did you know that you can access your hospital and ER discharge instructions at any time in [employer]? You can also review all of your test results from your hospital stay or ER visit. Additional Information If you have questions, please visit the Frequently Asked Questions section of the [employer] website at [email or web address] Remember, [employer] is NOT to be used for urgent needs. For medical emergencies, dial 911. Now available from your iPhone and Android! Please provide this summary of care documentation to your next provider. Your primary care clinician is listed as Meila Tse. If you have any questions after today's visit, please call [phone].

## 2017-08-03 RX ORDER — GRANISETRON HYDROCHLORIDE 1 MG/ML
1 INJECTION INTRAVENOUS ONCE
Status: COMPLETED | OUTPATIENT
Start: 2017-08-08 | End: 2017-08-08

## 2017-08-07 RX ORDER — POTASSIUM CHLORIDE 750 MG/1
TABLET, EXTENDED RELEASE ORAL
Qty: 30 TAB | Refills: 3 | Status: SHIPPED | OUTPATIENT
Start: 2017-08-07 | End: 2017-09-11 | Stop reason: SDUPTHER

## 2017-08-07 RX ORDER — VALSARTAN 160 MG/1
160 TABLET ORAL DAILY
Qty: 90 TAB | Refills: 0 | Status: SHIPPED | OUTPATIENT
Start: 2017-08-07 | End: 2017-11-12 | Stop reason: SDUPTHER

## 2017-08-08 ENCOUNTER — HOSPITAL ENCOUNTER (OUTPATIENT)
Dept: INFUSION THERAPY | Age: 57
Discharge: HOME | End: 2017-08-08
Payer: MEDICARE

## 2017-08-08 VITALS
HEIGHT: 68 IN | OXYGEN SATURATION: 90 % | DIASTOLIC BLOOD PRESSURE: 74 MMHG | SYSTOLIC BLOOD PRESSURE: 133 MMHG | RESPIRATION RATE: 18 BRPM | HEART RATE: 59 BPM | WEIGHT: 293 LBS | BODY MASS INDEX: 44.41 KG/M2 | TEMPERATURE: 98.7 F

## 2017-08-08 LAB
ALBUMIN SERPL BCP-MCNC: 2.8 G/DL (ref 3.5–5)
ALBUMIN/GLOB SERPL: 0.5 {RATIO} (ref 1.1–2.2)
ALP SERPL-CCNC: 81 U/L (ref 45–117)
ALT SERPL-CCNC: 29 U/L (ref 12–78)
ANION GAP BLD CALC-SCNC: 9 MMOL/L (ref 5–15)
AST SERPL W P-5'-P-CCNC: 40 U/L (ref 15–37)
BASO+EOS+MONOS # BLD AUTO: 0.4 K/UL (ref 0.2–1.2)
BASO+EOS+MONOS # BLD AUTO: 8 % (ref 3.2–16.9)
BILIRUB SERPL-MCNC: 0.3 MG/DL (ref 0.2–1)
BUN SERPL-MCNC: 17 MG/DL (ref 6–20)
BUN/CREAT SERPL: 17 (ref 12–20)
CALCIUM SERPL-MCNC: 9.2 MG/DL (ref 8.5–10.1)
CHLORIDE SERPL-SCNC: 109 MMOL/L (ref 97–108)
CO2 SERPL-SCNC: 25 MMOL/L (ref 21–32)
CREAT SERPL-MCNC: 1.03 MG/DL (ref 0.55–1.02)
DIFFERENTIAL METHOD BLD: ABNORMAL
ERYTHROCYTE [DISTWIDTH] IN BLOOD BY AUTOMATED COUNT: 12.7 % (ref 11.8–15.8)
GLOBULIN SER CALC-MCNC: 5.3 G/DL (ref 2–4)
GLUCOSE SERPL-MCNC: 106 MG/DL (ref 65–100)
HCT VFR BLD AUTO: 25.2 % (ref 35–47)
HGB BLD-MCNC: 8.2 G/DL (ref 11.5–16)
LYMPHOCYTES # BLD AUTO: 31 % (ref 12–49)
LYMPHOCYTES # BLD: 1.6 K/UL (ref 0.8–3.5)
MAGNESIUM SERPL-MCNC: 1.7 MG/DL (ref 1.6–2.4)
MCH RBC QN AUTO: 29.6 PG (ref 26–34)
MCHC RBC AUTO-ENTMCNC: 32.5 G/DL (ref 30–36.5)
MCV RBC AUTO: 91 FL (ref 80–99)
NEUTS SEG # BLD: 3.2 K/UL (ref 1.8–8)
NEUTS SEG NFR BLD AUTO: 61 % (ref 32–75)
PLATELET # BLD AUTO: 109 K/UL (ref 150–400)
POTASSIUM SERPL-SCNC: 3.9 MMOL/L (ref 3.5–5.1)
PROT SERPL-MCNC: 8.1 G/DL (ref 6.4–8.2)
RBC # BLD AUTO: 2.77 M/UL (ref 3.8–5.2)
SODIUM SERPL-SCNC: 143 MMOL/L (ref 136–145)
TROPONIN I SERPL-MCNC: <0.04 NG/ML
WBC # BLD AUTO: 5.2 K/UL (ref 3.6–11)

## 2017-08-08 PROCEDURE — 96361 HYDRATE IV INFUSION ADD-ON: CPT

## 2017-08-08 PROCEDURE — 96375 TX/PRO/DX INJ NEW DRUG ADDON: CPT

## 2017-08-08 PROCEDURE — 77030012965 HC NDL HUBR BBMI -A

## 2017-08-08 PROCEDURE — 74011250636 HC RX REV CODE- 250/636: Performed by: OBSTETRICS & GYNECOLOGY

## 2017-08-08 PROCEDURE — 83735 ASSAY OF MAGNESIUM: CPT | Performed by: NURSE PRACTITIONER

## 2017-08-08 PROCEDURE — 84484 ASSAY OF TROPONIN QUANT: CPT | Performed by: NURSE PRACTITIONER

## 2017-08-08 PROCEDURE — 36415 COLL VENOUS BLD VENIPUNCTURE: CPT | Performed by: OBSTETRICS & GYNECOLOGY

## 2017-08-08 PROCEDURE — 80053 COMPREHEN METABOLIC PANEL: CPT | Performed by: NURSE PRACTITIONER

## 2017-08-08 PROCEDURE — 96413 CHEMO IV INFUSION 1 HR: CPT

## 2017-08-08 PROCEDURE — 74011250636 HC RX REV CODE- 250/636: Performed by: NURSE PRACTITIONER

## 2017-08-08 PROCEDURE — 85025 COMPLETE CBC W/AUTO DIFF WBC: CPT | Performed by: OBSTETRICS & GYNECOLOGY

## 2017-08-08 PROCEDURE — 74011000250 HC RX REV CODE- 250: Performed by: OBSTETRICS & GYNECOLOGY

## 2017-08-08 PROCEDURE — 86304 IMMUNOASSAY TUMOR CA 125: CPT | Performed by: NURSE PRACTITIONER

## 2017-08-08 RX ORDER — SODIUM CHLORIDE 9 MG/ML
10 INJECTION INTRAMUSCULAR; INTRAVENOUS; SUBCUTANEOUS AS NEEDED
Status: ACTIVE | OUTPATIENT
Start: 2017-08-08 | End: 2017-08-09

## 2017-08-08 RX ORDER — SODIUM CHLORIDE 9 MG/ML
25 INJECTION, SOLUTION INTRAVENOUS AS NEEDED
Status: DISPENSED | OUTPATIENT
Start: 2017-08-08 | End: 2017-08-09

## 2017-08-08 RX ORDER — SODIUM CHLORIDE 0.9 % (FLUSH) 0.9 %
10 SYRINGE (ML) INJECTION AS NEEDED
Status: ACTIVE | OUTPATIENT
Start: 2017-08-08 | End: 2017-08-09

## 2017-08-08 RX ORDER — HEPARIN 100 UNIT/ML
500 SYRINGE INTRAVENOUS AS NEEDED
Status: ACTIVE | OUTPATIENT
Start: 2017-08-08 | End: 2017-08-09

## 2017-08-08 RX ADMIN — SODIUM CHLORIDE 2112 MG: 900 INJECTION, SOLUTION INTRAVENOUS at 13:42

## 2017-08-08 RX ADMIN — GRANISETRON HYDROCHLORIDE 1 MG: 1 INJECTION INTRAVENOUS at 12:55

## 2017-08-08 RX ADMIN — SODIUM CHLORIDE, PRESERVATIVE FREE 500 UNITS: 5 INJECTION INTRAVENOUS at 14:20

## 2017-08-08 RX ADMIN — SODIUM CHLORIDE 25 ML/HR: 900 INJECTION, SOLUTION INTRAVENOUS at 12:30

## 2017-08-08 RX ADMIN — Medication 10 ML: at 14:20

## 2017-08-08 RX ADMIN — SODIUM CHLORIDE 1000 ML: 900 INJECTION, SOLUTION INTRAVENOUS at 10:51

## 2017-08-08 RX ADMIN — SODIUM CHLORIDE 10 ML: 9 INJECTION INTRAMUSCULAR; INTRAVENOUS; SUBCUTANEOUS at 10:51

## 2017-08-08 RX ADMIN — Medication 10 ML: at 10:51

## 2017-08-08 NOTE — TELEPHONE ENCOUNTER
Identified patient 2 identifiers verified. Patient is aware that Diovan and Potassium Scripts have been sent to the Pharmacy.

## 2017-08-08 NOTE — PROGRESS NOTES
Shahla Triplett. JAD Hernandez      Date of visit: 8/8/2017       Subjective:   Ms. Salvatore Castellon is a long time pt of Dr. Swetha Andrews. She was initially dx with ovarian cancer in Sept 2012. She had MARAH/BSO/Cytoreductive surgery on 9/21/12. At that time she was Stage III C. She recovered and began 6 cycles of Carbo/Taxol. Had good response, but reoccurred and in Fall of 2014 she received 3 cycles of Carbo/Taxol before having a reaction to Carbo and this was stopped. She continued her next 3 cycles with Doxil/Avastin. This did not decrease disease. .   She had a treatment break from 3/2015 until she restarted with Topatecan for 11 cycles from July 2015 through March 2016. She was followed by Dr. Swetha Andrews and in Jan 2017, she had a CT scan that showed recurrence of disease. She began treatment with gemzar q21 days on Feb 2nd and presents today for cycle 5D8  Today, her hgb is noted at 8.2  Noted her creat improved to 1.03  At her last visit to the infusion center, she complained of SOB and CP. She was taken to the ER and a work up was negative and she was referred to Pulmonary medicine. She follow up with Dr. Piedad Swartz on 7/25/17 for SOB. She is scheduled for PFT's, was given an MDI, told to continue with her nebulizer's, follw up in sleep clinic and will follow up with her in 4-6 weeks. Today, she is complaining of \"pain and heaviness under her breast\". Pt says it is not associated with exertion and does not radiate to any other part of her body. After questioning, she said she feels it is the weight of her breast as \"one time when I lost a lot of weight, the same thing happened\". Denies any associated SOB with it. Denies abd or flank pain.    She continues drinking \"green smoothies\" twice a day between meals that she says keeps her \"regular\" regarding her bowels           Diagnosis: Ovarian cancer  Original Surgery: 9/21/12 Ex-lap, ROULA/BSO, radical tumor debulking  Pathology: High grade serious ovarian cancer with omental cake and carcinomatosis. Stage IIIC        Current Outpatient Prescriptions   Medication Sig    KLOR-CON M10 10 mEq tablet TAKE 1 TAB BY MOUTH TWO (2) DAYS A WEEK.  valsartan (DIOVAN) 160 mg tablet Take 1 Tab by mouth daily for 90 days. Indications: hypertension    ondansetron (ZOFRAN ODT) 8 mg disintegrating tablet Take 1 Tab by mouth every eight (8) hours as needed for Nausea.  aspirin delayed-release 81 mg tablet Take  by mouth daily.  BD INSULIN PEN NEEDLE UF SHORT 31 gauge x 5/16\" ndle USE WITH INSULIN TO INJECT FIVE TIMES DAILY.  HYDROcodone-acetaminophen (NORCO) 5-325 mg per tablet Take 1 Tab by mouth every four (4) hours as needed for Pain. Max Daily Amount: 6 Tabs.  fluticasone (FLONASE) 50 mcg/actuation nasal spray     clonazePAM (KLONOPIN) 0.5 mg tablet Take 0.5 mg by mouth nightly as needed.  nystatin (MYCOSTATIN) powder Apply  to affected area four (4) times daily as needed.  OTHER,NON-FORMULARY, 2 Tabs daily (after dinner). [facility] (vitamin)    insulin aspart (NOVOLOG FLEXPEN) 100 unit/mL inpn INJECT 20 UNITS BEFORE EACH MEAL + 4 UNITS FOR EVERY 50 MG/DL ABOVE 150 MG/DL.  UNITS PER DAY    SYMBICORT 160-4.5 mcg/actuation HFA inhaler 2 Puffs two (2) times daily as needed.  omega-3 acid ethyl esters (LOVAZA) 1 gram capsule TAKE ONE CAPSULE BY MOUTH TWICE A DAY    Liraglutide (VICTOZA) 0.6 mg/0.1 mL (18 mg/3 mL) sub-q pen 0.3 mL by SubCUTAneous route daily (with lunch).  cycloSPORINE (RESTASIS) 0.05 % ophthalmic emulsion 1 Drop two (2) times a day.  multivitamin (ONE A DAY) tablet Take 2 Tabs by mouth daily (after dinner).  sertraline (ZOLOFT) 50 mg tablet TAKE 1 TABLET BY MOUTH EVERY MORNING    methocarbamol (ROBAXIN) 500 mg tablet Take 500 mg by mouth three (3) times daily as needed.  LEVEMIR FLEXTOUCH 100 unit/mL (3 mL) inpn 30 UNITS IN THE MORNING AND 50 UNITS AT NIGHT. INCREASE AS DIRECTED TO MAX  UNITS PER DAY    montelukast (SINGULAIR) 10 mg tablet Take 1 Tab by mouth daily.  levothyroxine (SYNTHROID) 150 mcg tablet TAKE 1 TAB BY MOUTH DAILY (BEFORE BREAKFAST).  fluticasone-salmeterol (ADVAIR HFA) 230-21 mcg/actuation inhaler Take 2 Puffs by inhalation two (2) times a day.  albuterol (PROVENTIL VENTOLIN) 2.5 mg /3 mL (0.083 %) nebulizer solution 3 mL by Nebulization route every four (4) hours as needed for Wheezing.  cholecalciferol, VITAMIN D3, (VITAMIN D3) 5,000 unit tab tablet Take 5,000 Units by mouth daily.  furosemide (LASIX) 40 mg tablet Take 80 mg by mouth as needed.  albuterol (PROVENTIL HFA, VENTOLIN HFA, PROAIR HFA) 90 mcg/actuation inhaler Take 2 Puffs by inhalation every four (4) hours as needed for Wheezing.  fexofenadine (ALLEGRA) 180 mg tablet Take 180 mg by mouth daily. Current Facility-Administered Medications   Medication Dose Route Frequency    sodium chloride 0.9 % injection 10 mL  10 mL IntraVENous PRN    heparin (porcine) pf 500 Units  500 Units IntraVENous PRN    sodium chloride (NS) flush 10 mL  10 mL IntraVENous PRN    sodium chloride 0.9 % bolus infusion 1,000 mL  1,000 mL IntraVENous ONCE    gemcitabine (GEMZAR) 2,112 mg in 0.9% sodium chloride 250 mL, overfill volume 25 mL chemo infusion  2,112 mg IntraVENous ONCE    granisetron (KYTRIL) injection 1 mg  1 mg IntraVENous ONCE        Review of Systems:  General: She has lost 11.8 KG since initiating her treatment. She acknowledges some fatigue. HEENT: Denies visual changes, dysphagia or headache  Resp: Denies SOB, or change in ECKERT, wheezing or cough  CV:   GI/: Denies N/V, diarrhea, constipation or dysuria (says her \"green drinks\" keep her regular)  [place]:Continues with DJD knee pain. Says it has not been \" as bad lately\". She said she was told she needed a TKR, but has not gotten it yet.  Continues to take tylenol for pain when needed and rest leg  Neuro: Denies dizzyness or syncope    Objective:     Patient Vitals for the past 8 hrs:   BP Temp Pulse Resp SpO2 Height Weight   08/08/17 1018 142/71 98.3 °F (36.8 °C) 64 18 91 % 5' 8\" (1.727 m) 318 lb (144.2 kg)       Physical Exam:  General: A&O X3 in NAD with pleasant affect. HEENT: Sclera anicteric, pupils equal and reactive to light. Mucosa pink, slightly dry  Neck: No JVD. No cervical adenopathy appreciated. Chest: large, pendulous breast resting on upper abd. No tenderness, rash, swelling noted on area under breast.  When breast are elevated, she states she \"feels some relief\"  Heart: Regular with persistent 2-3/6 murmur  Lungs: CTA Bilat without wheezing or rales   Abd: Soft, obese, NT/ND with + BS throughout  Ext: Without edema and + pedal pulses bilat  Neuro: grossly intact    Recent Results (from the past 12 hour(s))   CBC WITH 3 PART DIFF    Collection Time: 08/08/17 10:44 AM   Result Value Ref Range    WBC 5.2 3.6 - 11.0 K/uL    RBC 2.77 (L) 3.80 - 5.20 M/uL    HGB 8.2 (L) 11.5 - 16.0 g/dL    HCT 25.2 (L) 35.0 - 47.0 %    MCV 91.0 80.0 - 99.0 FL    MCH 29.6 26.0 - 34.0 PG    MCHC 32.5 30.0 - 36.5 g/dL    RDW 12.7 11.8 - 15.8 %    PLATELET 660 (L) 706 - 400 K/uL    NEUTROPHILS 61 32 - 75 %    MIXED CELLS 8 3.2 - 16.9 %    LYMPHOCYTES 31 12 - 49 %    ABS. NEUTROPHILS 3.2 1.8 - 8.0 K/UL    ABS. MIXED CELLS 0.4 0.2 - 1.2 K/uL    ABS.  LYMPHOCYTES 1.6 0.8 - 3.5 K/UL    DF AUTOMATED     METABOLIC PANEL, COMPREHENSIVE    Collection Time: 08/08/17 10:44 AM   Result Value Ref Range    Sodium 143 136 - 145 mmol/L    Potassium 3.9 3.5 - 5.1 mmol/L    Chloride 109 (H) 97 - 108 mmol/L    CO2 25 21 - 32 mmol/L    Anion gap 9 5 - 15 mmol/L    Glucose 106 (H) 65 - 100 mg/dL    BUN 17 6 - 20 MG/DL    Creatinine 1.03 (H) 0.55 - 1.02 MG/DL    BUN/Creatinine ratio 17 12 - 20      GFR est AA >60 >60 ml/min/1.73m2    GFR est non-AA 55 (L) >60 ml/min/1.73m2    Calcium 9.2 8.5 - 10.1 MG/DL    Bilirubin, total 0.3 0.2 - 1.0 MG/DL ALT (SGPT) 29 12 - 78 U/L    AST (SGOT) 40 (H) 15 - 37 U/L    Alk. phosphatase 81 45 - 117 U/L    Protein, total 8.1 6.4 - 8.2 g/dL    Albumin 2.8 (L) 3.5 - 5.0 g/dL    Globulin 5.3 (H) 2.0 - 4.0 g/dL    A-G Ratio 0.5 (L) 1.1 - 2.2     MAGNESIUM    Collection Time: 08/08/17 10:44 AM   Result Value Ref Range    Magnesium 1.7 1.6 - 2.4 mg/dL   TROPONIN I    Collection Time: 08/08/17 10:44 AM   Result Value Ref Range    Troponin-I, Qt. <0.04 <0.05 ng/mL           CT ABD/Pelvis without contrast 4/4/2017:  FINDINGS:   LUNG BASES: Clear. INCIDENTALLY IMAGED HEART AND MEDIASTINUM: Enlarged pericardiophrenic and  diaphragmatic lymph nodes. LIVER: No mass or biliary dilatation. GALLBLADDER: Unremarkable. SPLEEN: No mass. PANCREAS: No mass or ductal dilatation. ADRENALS: Unremarkable. KIDNEYS/URETERS: Bilateral nephrolithiasis. There is soft tissue density in the  region of the left renal hilum.     STOMACH: Unremarkable. SMALL BOWEL: No dilatation or wall thickening. COLON: No dilatation or wall thickening. APPENDIX: Unremarkable. PERITONEUM: There multiple enlarged lymph nodes within the mesentery. There is  significant pelvic adenopathy and an enlarged right groin lymph node. RETROPERITONEUM: No aneurysm. REPRODUCTIVE ORGANS:  URINARY BLADDER: No mass or calculus. BONES: No destructive bone lesion. ADDITIONAL COMMENTS: N/A     IMPRESSION  IMPRESSION:     1. Bilateral nephrolithiasis. 2. Soft tissue density left renal hilum. It would be helpful to obtain a CT with  contrast.  3. No significant change in multiple enlarged abdominal and pelvic lymph nodes. CTA 7/18/2017 :     FINDINGS: This is a good quality study for the evaluation of pulmonary embolism  to the first subsegmental arterial level. There is no pulmonary embolism to this  level.      The right chest Port-A-Cath terminates in the SVC. Aorta Cardiac size is within  normal limits.  No pericardial effusion.      There is mediastinal lymphadenopathy with a representative prevascular lymph  node measuring 2.5 x 3.0 cm (series 3, image 84), previously 2.6 x 2.9 cm. Soft  tissue mass in the anterior right chest wall measures 2.7 x 3.9 cm (series 3,  image 77), previously 2.7 x 3.7 cm. A representative right pericardial lymph  node measures 2.2 x 2.3 cm (series 3, image 44), previously 1.9 x 2.5 cm.     There are mild patchy bilateral groundglass opacities. There is no lung mass or  consolidation. No pleural effusion or pneumothorax. Central airways are  unremarkable.     Upper abdominal lymphadenopathy is grossly unchanged. There is no aggressive  blastic or lytic bony lesion.     IMPRESSION  IMPRESSION:   1. There is no acute pulmonary embolism.     2. Mediastinal lymphadenopathy, upper abdominal lymphadenopathy, and chest wall  soft tissue metastatic disease is overall unchanged.     3. Mild patchy bilateral groundglass opacities in the lungs may represent  pulmonary edema or nonspecific infection or inflammation. Attention on follow-up  is recommended. Pathology:   FINAL PATHOLOGIC DIAGNOSIS  1. Omentum, omentectomy:  Serous carcinoma, high grade  2. Soft tissue, umbilicus, biopsy:  Serous carcinoma, high grade  3. Peritoneum, periappendiceal, biopsy:  Serous carcinoma, high grade  4. Colonic mesentery, biopsy:  Serous carcinoma, high grade  See comment  5.  Uterus (157 grams), ovaries and fallopian tubes, supracervical hysterectomy and bilateral salpingooophorectomy:  Cervix: No diagnostic pathologic abnormality  Myometrium: No diagnostic pathologic abnormality  Endometrium: Benign polyp and cystic atrophy  Ovaries and fallopian tubes: Serous carcinoma, high grade  Serosa: Serous carcinoma implants, high-grade  See synoptic report  Synoptic Report:  Specimen: Uterus, ovaries and fallopian tubes, omentum  Procedure: Supracervical hysterectomy, bilateral salpingo-oophorectomy, omentectomy  Lymph node sampling: No lymph nodes present  Specimen integrity:  Right ovary: Intact        Assessment:     Patient Active Problem List   Diagnosis Code    Histor of ovarian cancer Z85.43    Carcinomatosis ([facility] [address] C80.0    Controlled type 2 diabetes mellitus without complication, with long-term current use of insulin ([facility] [address] E11.9, Z79.4    Morbid obesity ([facility] [address] E66.01    Abnormal mammogram R92.8    Sleep apnea G47.30    Dyslipidemia (high LDL; low HDL) E78.4    Benign essential hypertension I10    Disorder of thyroid E07.9    CINV (chemotherapy-induced nausea and vomiting) R11.2, T45.1X5A    Swelling of eye, bilateral--left > right H57.8    Portacath in place--left Z95.828    Chest pain, atypical R07.89    Hx of pulmonary embolus during pregnancy Z86.711, Z87.59    Elevated CA-125 R97.1    Ovarian cancer (HCC) C56.9    Hyperglycemia due to type 2 diabetes mellitus ([facility] [address] E11.65    Preop cardiovascular exam Z01.810    Angina pectoris associated with type 2 diabetes mellitus (HCC) E11.59, I20.9    ARF (acute renal failure) (HCC) N17.9    Renal calculi N20.0    Elevated BUN R79.9    Pulmonary hypertension, mild (HCC) I27.2    UTI (urinary tract infection) N39.0    Wheezing R06.2    Lymphadenopathy, mediastinal R59.0         Plan: Will proceed with C5D8 of Gemzar  She will follow up with urologist as scheduled  She will let us know for any darkening or change in her urine  Encouraged to drink pleanty of water   Will hydrate with extra liter of fluid today    drawn today and will monitor for results  Will continue to follow her hgb and transfuse below 7.5 or symptomatic   Follow up with cardiologist as needed  Follow up with Pulmonology as directed and obtain PFT's as instructed  If she has ANY chest pain or associated pain or concern, she was instructed to proceed directly to the ER  Recommended obtaining a more substantial bra.    Follow up with PCP re-DM/HTN/Lipids as scheduled  Continue current meds  Hydration encouraged  Encouraged to call for any concerns or questions  Matthew Good NP

## 2017-08-08 NOTE — PROGRESS NOTES
1015 Pt admit to [facility] for C 5, D 8 Gemzar ambulatory in stable condition. Assessment completed. Having some mid thoracic pain that extends left to right under her breasts, noted to have diminished oxygen sats, ECKERT and fatigue. Notified [address], NP of assessment findings. Seen by her chairside, additional labs and fluid hydration ordered. No new concerns voiced. Port accessed with positive blood return. Labs drawn and sent. Normal Saline bolus given over 1 hour. Visit Vitals    /71 (BP 1 Location: Right arm, BP Patient Position: Sitting)    Pulse 64    Temp 98.3 °F (36.8 °C)    Resp 18    Ht 5' 8\" (1.727 m)    Wt 144.2 kg (318 lb)    LMP 09/07/2011    SpO2 91%    BMI 48.35 kg/m2       Medications:  Normal Saline KVO  Normal Saline bolus  Kytril  Gemzar    1420 Pt tolerated treatment well. Port maintained positive blood return throughout treatment, flushed with positive blood return at conclusion and heparinized per protocol. Lan Claw removed. D/c home ambulatory in no distress. Pt aware of next appointment scheduled for 8/29/17. Recent Results (from the past 12 hour(s))   CBC WITH 3 PART DIFF    Collection Time: 08/08/17 10:44 AM   Result Value Ref Range    WBC 5.2 3.6 - 11.0 K/uL    RBC 2.77 (L) 3.80 - 5.20 M/uL    HGB 8.2 (L) 11.5 - 16.0 g/dL    HCT 25.2 (L) 35.0 - 47.0 %    MCV 91.0 80.0 - 99.0 FL    MCH 29.6 26.0 - 34.0 PG    MCHC 32.5 30.0 - 36.5 g/dL    RDW 12.7 11.8 - 15.8 %    PLATELET 154 (L) 097 - 400 K/uL    NEUTROPHILS 61 32 - 75 %    MIXED CELLS 8 3.2 - 16.9 %    LYMPHOCYTES 31 12 - 49 %    ABS. NEUTROPHILS 3.2 1.8 - 8.0 K/UL    ABS. MIXED CELLS 0.4 0.2 - 1.2 K/uL    ABS.  LYMPHOCYTES 1.6 0.8 - 3.5 K/UL    DF AUTOMATED     METABOLIC PANEL, COMPREHENSIVE    Collection Time: 08/08/17 10:44 AM   Result Value Ref Range    Sodium 143 136 - 145 mmol/L    Potassium 3.9 3.5 - 5.1 mmol/L    Chloride 109 (H) 97 - 108 mmol/L    CO2 25 21 - 32 mmol/L    Anion gap 9 5 - 15 mmol/L    Glucose 106 (H) 65 - 100 mg/dL    BUN 17 6 - 20 MG/DL    Creatinine 1.03 (H) 0.55 - 1.02 MG/DL    BUN/Creatinine ratio 17 12 - 20      GFR est AA >60 >60 ml/min/1.73m2    GFR est non-AA 55 (L) >60 ml/min/1.73m2    Calcium 9.2 8.5 - 10.1 MG/DL    Bilirubin, total 0.3 0.2 - 1.0 MG/DL    ALT (SGPT) 29 12 - 78 U/L    AST (SGOT) 40 (H) 15 - 37 U/L    Alk.  phosphatase 81 45 - 117 U/L    Protein, total 8.1 6.4 - 8.2 g/dL    Albumin 2.8 (L) 3.5 - 5.0 g/dL    Globulin 5.3 (H) 2.0 - 4.0 g/dL    A-G Ratio 0.5 (L) 1.1 - 2.2     MAGNESIUM    Collection Time: 08/08/17 10:44 AM   Result Value Ref Range    Magnesium 1.7 1.6 - 2.4 mg/dL   TROPONIN I    Collection Time: 08/08/17 10:44 AM   Result Value Ref Range    Troponin-I, Qt. <0.04 <0.05 ng/mL

## 2017-08-09 LAB — CANCER AG125 SERPL-ACNC: 160 U/ML (ref 0–30)

## 2017-08-11 ENCOUNTER — HOSPITAL ENCOUNTER (EMERGENCY)
Age: 57
Discharge: HOME | End: 2017-08-11
Attending: FAMILY MEDICINE

## 2017-08-11 VITALS
BODY MASS INDEX: 43.4 KG/M2 | SYSTOLIC BLOOD PRESSURE: 106 MMHG | WEIGHT: 293 LBS | RESPIRATION RATE: 20 BRPM | HEIGHT: 69 IN | TEMPERATURE: 98.9 F | OXYGEN SATURATION: 91 % | HEART RATE: 76 BPM | DIASTOLIC BLOOD PRESSURE: 55 MMHG

## 2017-08-11 DIAGNOSIS — L95.9 VASCULITIS OF SKIN: ICD-10-CM

## 2017-08-11 DIAGNOSIS — L98.9 PAINFUL SKIN LESION: Primary | ICD-10-CM

## 2017-08-11 NOTE — UC PROVIDER NOTE
Patient is a 65 y.o. female presenting with leg pain. The history is provided by the patient. Leg Pain    This is a new problem. The current episode started 3 to 5 hours ago. The problem occurs constantly. The problem has not changed since onset. The pain is present in the right lower leg. The quality of the pain is described as intermittent. The pain is at a severity of 7/10. The symptoms are aggravated by palpation. She has tried nothing for the symptoms. There has been no history of extremity trauma. Past Medical History:   Diagnosis Date    Adverse effect of anesthesia     sleep apnea cpap machine useage     Anemia     Arthritis     DDD low back and knees    Asthma     uses albuterol prn only; none x 6 mos.   Never hospitalized    BRCA negative 2013    Calculus of kidney     Chronic kidney disease     kidney stones    Chronic pain     knee pain bilat    CINV (chemotherapy-induced nausea and vomiting) 2014    Diabetes ([address] Age 46    IDDM    Environmental allergies     Fibromyalgia     GERD (gastroesophageal reflux disease)     controlled with med    Hypertension     Ill-defined condition     Sepsis     Ill-defined condition 2016    chemotherapy     Morbid obesity ([address]     Other and unspecified hyperlipidemia     Ovarian cancer ([address] 2012, 2014    serous, high grade, stage IIIc. s/p chemotx (has port)    Psychiatric disorder     Rectal bleeding     Thromboembolus ([address]     POST PARTUM; resolved- PELVIS    Thyroid disease     HYPOTHYROID    Unspecified sleep apnea     CPAP, Dr. Halley Villeda        Past Surgical History:   Procedure Laterality Date    BREAST SURGERY PROCEDURE UNLISTED      Lymph node in left breast 2017    CHEST SURGERY PROCEDURE UNLISTED      Placement of port a cath right chest    HX GYN       X2    HX HYSTERECTOMY  2012    ROULA/BSO, tumor debulking, omentectomy for ovarian cancer    HX ORTHOPAEDIC      ankle-FX, R    HX ORTHOPAEDIC      FX R ARM    HX UROLOGICAL      stent    HX VASCULAR ACCESS  11/4/2015    right chest- port placed         Family History   Problem Relation Age of Onset    Hypertension Mother     Arthritis-osteo Mother     Lung Disease Father      COPD    Hypertension Father     Arthritis-osteo Father     Hypertension Brother     Elevated Lipids Brother     Arthritis-osteo Brother     Hypertension Brother     Elevated Lipids Brother     Obesity Brother     Cancer Brother      PROSTATE    Anesth Problems Son      DELAYED AWAKENING    Diabetes Maternal Grandmother         Social History     Social History    Marital status:      Spouse name: N/A    Number of children: N/A    Years of education: N/A     Occupational History    Not on file. Social History Main Topics    Smoking status: Never Smoker    Smokeless tobacco: Never Used    Alcohol use No    Drug use: Yes     Special: Prescription, OTC    Sexual activity: Yes     Other Topics Concern    Not on file     Social History Narrative    Lives in [facility] alone.  passed away in 5/16 of a heart attack. Has 2 sons. Disability. 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 Enjoys swimming and going to the gym. ALLERGIES: Carboplatin; Iodinated contrast- oral and iv dye; Lipitor [atorvastatin]; Percocet [oxycodone-acetaminophen]; Shellfish containing products; and Tape [adhesive]    Review of Systems   Constitutional: Negative for chills. HENT: Negative for congestion. Cardiovascular: Positive for leg swelling. Skin: Positive for color change. Vitals:    08/11/17 1043   BP: 106/55   Pulse: 76   Resp: 20   Temp: 98.9 °F (37.2 °C)   SpO2: 91%   Weight: 141.5 kg (312 lb)   Height: 5' 9\" (1.753 m)       Physical Exam   Constitutional: She is oriented to person, place, and time. She appears well-developed and well-nourished.    Eyes: Conjunctivae and EOM are normal.   Cardiovascular: Normal rate and regular rhythm. Pulmonary/Chest: Effort normal.   Neurological: She is alert and oriented to person, place, and time. Skin: Skin is warm and dry. There is erythema. Discrete erythematous raised lesions on right lower leg   Psychiatric: She has a normal mood and affect. Her behavior is normal. Judgment and thought content normal.   Nursing note and vitals reviewed. MDM     Differential Diagnosis; Clinical Impression; Plan:     CLINICAL IMPRESSION:  Painful skin lesion  (primary encounter diagnosis)  Vasculitis of skin    Plan:  1. Follow up with PCP  2. If symptoms worsen go to ER  3. Risk of Significant Complications, Morbidity, and/or Mortality:   Presenting problems: Moderate  Diagnostic procedures: Moderate  Management options:   Moderate  Progress:   Patient progress:  Stable      Procedures

## 2017-08-22 DIAGNOSIS — C56.9 OVARIAN CANCER, UNSPECIFIED LATERALITY (HCC): Primary | ICD-10-CM

## 2017-08-24 ENCOUNTER — OFFICE VISIT (OUTPATIENT)
Dept: GYNECOLOGY | Age: 57
End: 2017-08-24

## 2017-08-24 VITALS
DIASTOLIC BLOOD PRESSURE: 71 MMHG | BODY MASS INDEX: 43.4 KG/M2 | SYSTOLIC BLOOD PRESSURE: 125 MMHG | HEIGHT: 69 IN | HEART RATE: 80 BPM | WEIGHT: 293 LBS

## 2017-08-24 DIAGNOSIS — R97.1 ELEVATED CA-125: ICD-10-CM

## 2017-08-24 DIAGNOSIS — C56.9 OVARIAN CANCER, UNSPECIFIED LATERALITY (HCC): ICD-10-CM

## 2017-08-24 DIAGNOSIS — C80.0 CARCINOMATOSIS (HCC): Primary | ICD-10-CM
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
## 2017-08-24 NOTE — PROGRESS NOTES
[facility] [address]ias [address], [address]  [phone]  MD Rashel Bustillos MD      Patient ID:  Name: Jose Neumann  MRN: [id number]  : 1960/53 y.o. Visit date: 2017     Acute Diagnosis:   Vaginal spotting   Persistent EOC    HISTORY:  49 y.o.  female with a diagnosis of ovarian cancer. Her original surgery: 12 Ex-lap, ROULA/BSO, radical tumor debulking. Pathology:  High grade serious ovarian cancer with omental cake and carcinomatosis. Stage IIIC. She received Taxol/carboplatin (10/2012 - 2013). She recurred in mid  noted CT with Peritoneal carcinomatosis with slightly increased size of the peritoneal implants throughout the abdomen, Pericardial lymph nodes unchanged and Bilateral pelvic adenopathy increased in size. Initiated retreatment with Taxol/carboplatin (2014-2014) with HSR. She was then monitored until progression, initiated on Doxil/Avastin (3/2014), did poorly, though MRI following cycle 3 showed extensive progression. She has receive Topotecan palliation. Component      Latest Ref Rng & Units 2017          10:44 AM 10:01 AM 10:26 AM 10:17 AM   CA-125      0 - 30 U/mL 160 (H)  108 (H) 115 (H)   Cancer Ag (CA) 125      0.0 - 38.1 U/mL  157.4 (H)       Component      Latest Ref Rng & Units 3/9/2017           3:52 PM   CA-125      0 - 30 U/mL 181 (H)   Cancer Ag (CA) 125      0.0 - 38.1 U/mL        Recent initiation of IV Gemzar--#5 (2017)    2017:  Recent [facility] [place] admission for chest pain. Benign workup for cardiopulmonary/DVT pathology  Presents today in follow up and consideration of continuation of chemotherapy. Ongoing evaluation of right knee swelling--advised to knee replacement. Denies dysuria, hematuria. Urinary incontinence continues, vaginal discharge, N/V, F/C. Ongoing dental evaluation for ? ? infection.  Placed on antibiotics  Negative  and GI review for dysfunction  Negative cardiopulmonary review  No weight loss---309 lb  PMH:  Past Medical History:   Diagnosis Date    Adverse effect of anesthesia     sleep apnea cpap machine useage     Anemia     Arthritis     DDD low back and knees    Asthma     uses albuterol prn only; none x 6 mos. Never hospitalized    BRCA negative 2013    Calculus of kidney     Chronic kidney disease     kidney stones    Chronic pain     knee pain bilat    CINV (chemotherapy-induced nausea and vomiting) 2014    Diabetes ([facility] Age 46    IDDM    Environmental allergies     Fibromyalgia     GERD (gastroesophageal reflux disease)     controlled with med    Hypertension     Ill-defined condition     Sepsis     Ill-defined condition 2016    chemotherapy     Morbid obesity ([facility]     Other and unspecified hyperlipidemia     Ovarian cancer ([facility] 2012, 2014    serous, high grade, stage IIIc. s/p chemotx (has port)    Psychiatric disorder     Rectal bleeding     Thromboembolus ([facility]     POST PARTUM; resolved- PELVIS    Thyroid disease     HYPOTHYROID    Unspecified sleep apnea     CPAP, Dr. Syeda Maloney     PSH:  Past Surgical History:   Procedure Laterality Date    BREAST SURGERY PROCEDURE UNLISTED      Lymph node in left breast 2017    CHEST SURGERY PROCEDURE UNLISTED      Placement of port a cath right chest    HX GYN       X2    HX HYSTERECTOMY  2012    ROULA/BSO, tumor debulking, omentectomy for ovarian cancer    HX ORTHOPAEDIC      ankle-FX, R    HX ORTHOPAEDIC      FX R ARM    HX UROLOGICAL      stent    HX VASCULAR ACCESS  2015    right chest- port placed     Medications:  Current Outpatient Prescriptions on File Prior to Visit   Medication Sig Dispense Refill    KLOR-CON M10 10 mEq tablet TAKE 1 TAB BY MOUTH TWO (2) DAYS A WEEK. 30 Tab 3    valsartan (DIOVAN) 160 mg tablet Take 1 Tab by mouth daily for 90 days.  Indications: hypertension 90 Tab 0    ondansetron (ZOFRAN ODT) 8 mg disintegrating tablet Take 1 Tab by mouth every eight (8) hours as needed for Nausea. 30 Tab 3    aspirin delayed-release 81 mg tablet Take  by mouth daily.  BD INSULIN PEN NEEDLE UF SHORT 31 gauge x 5/16\" ndle USE WITH INSULIN TO INJECT FIVE TIMES DAILY. 200 Pen Needle 11    HYDROcodone-acetaminophen (NORCO) 5-325 mg per tablet Take 1 Tab by mouth every four (4) hours as needed for Pain. Max Daily Amount: 6 Tabs. 30 Tab 0    fluticasone (FLONASE) 50 mcg/actuation nasal spray       clonazePAM (KLONOPIN) 0.5 mg tablet Take 0.5 mg by mouth nightly as needed.  nystatin (MYCOSTATIN) powder Apply  to affected area four (4) times daily as needed.  OTHER,NON-FORMULARY, 2 Tabs daily (after dinner). [facility] (vitamin)      insulin aspart (NOVOLOG FLEXPEN) 100 unit/mL inpn INJECT 20 UNITS BEFORE EACH MEAL + 4 UNITS FOR EVERY 50 MG/DL ABOVE 150 MG/DL.  UNITS PER DAY 30 mL 11    SYMBICORT 160-4.5 mcg/actuation HFA inhaler 2 Puffs two (2) times daily as needed.  omega-3 acid ethyl esters (LOVAZA) 1 gram capsule TAKE ONE CAPSULE BY MOUTH TWICE A DAY  3    Liraglutide (VICTOZA) 0.6 mg/0.1 mL (18 mg/3 mL) sub-q pen 0.3 mL by SubCUTAneous route daily (with lunch). 3 Each 11    cycloSPORINE (RESTASIS) 0.05 % ophthalmic emulsion 1 Drop two (2) times a day.  sertraline (ZOLOFT) 50 mg tablet TAKE 1 TABLET BY MOUTH EVERY MORNING  1    LEVEMIR FLEXTOUCH 100 unit/mL (3 mL) inpn 30 UNITS IN THE MORNING AND 50 UNITS AT NIGHT. INCREASE AS DIRECTED TO MAX  UNITS PER DAY 30 mL 11    montelukast (SINGULAIR) 10 mg tablet Take 1 Tab by mouth daily. 90 Tab 2    levothyroxine (SYNTHROID) 150 mcg tablet TAKE 1 TAB BY MOUTH DAILY (BEFORE BREAKFAST). 90 Tab 3    fluticasone-salmeterol (ADVAIR HFA) 230-21 mcg/actuation inhaler Take 2 Puffs by inhalation two (2) times a day.  12 g 5    albuterol (PROVENTIL VENTOLIN) 2.5 mg /3 mL (0.083 %) nebulizer solution 3 mL by Nebulization route every four (4) hours as needed for Wheezing. 24 Each 11    cholecalciferol, VITAMIN D3, (VITAMIN D3) 5,000 unit tab tablet Take 5,000 Units by mouth daily.  furosemide (LASIX) 40 mg tablet Take 80 mg by mouth as needed. 3    albuterol (PROVENTIL HFA, VENTOLIN HFA, PROAIR HFA) 90 mcg/actuation inhaler Take 2 Puffs by inhalation every four (4) hours as needed for Wheezing.  fexofenadine (ALLEGRA) 180 mg tablet Take 180 mg by mouth daily. No current facility-administered medications on file prior to visit. Allergies   Allergen Reactions    Carboplatin Other (comments)     Patient developed shortness of breath, felt faint, coughing, skin flushed and \"itchy\". This occurred on the patients 8th treatment.  Iodinated Contrast- Oral And Iv Dye Rash    Lipitor [Atorvastatin] Myalgia    Percocet [Oxycodone-Acetaminophen] Other (comments)     fever    Shellfish Containing Products Hives    Tape [Adhesive] Itching and Other (comments)     \"op site-- clear,thin tape\"--caused blister      ROS:  Negative    OBJECTIVE:  Vitals: Chaya Loose Vitals:    08/24/17 1041   BP: 125/71   Pulse: 80   Weight: 309 lb 9.6 oz (140.4 kg)   Height: 5' 9\" (1.753 m)        Office Examination:    General appearance - alert, well appearing, and in no distress  Eyes - clear  Lymphatics - no palpable lymphadenopathy  Heart - normal rate and regular rhythm, S1 and S2 normal  Pulmonary - Clear to ausculation and percussion  Abdomen - rotund, soft, nontender, nondistended, no masses or organomegaly/fluid wave  Rectal - rectal exam not indicated  Back exam - no CVAT  Extrem: Mild right knee/extremity edema, L>R, good pulses.  Nontender      DATE REVIEW as available:  Lab Results   Component Value Date/Time    WBC 5.2 08/08/2017 10:44 AM    Hemoglobin (POC) 10.5 03/20/2017 12:15 PM    HGB 8.2 08/08/2017 10:44 AM    Hematocrit (POC) 31 03/20/2017 12:15 PM    HCT 25.2 08/08/2017 10:44 AM PLATELET 941 26/36/0568 10:44 AM    MCV 91.0 08/08/2017 10:44 AM     Lab Results   Component Value Date/Time    Sodium 143 08/08/2017 10:44 AM    Potassium 3.9 08/08/2017 10:44 AM    Chloride 109 08/08/2017 10:44 AM    CO2 25 08/08/2017 10:44 AM    Anion gap 9 08/08/2017 10:44 AM    Glucose 106 08/08/2017 10:44 AM    BUN 17 08/08/2017 10:44 AM    Creatinine 1.03 08/08/2017 10:44 AM    BUN/Creatinine ratio 17 08/08/2017 10:44 AM    GFR est AA >60 08/08/2017 10:44 AM    GFR est non-AA 55 08/08/2017 10:44 AM    Calcium 9.2 08/08/2017 10:44 AM    Bilirubin, total 0.3 08/08/2017 10:44 AM    AST (SGOT) 40 08/08/2017 10:44 AM    Alk. phosphatase 81 08/08/2017 10:44 AM    Protein, total 8.1 08/08/2017 10:44 AM    Albumin 2.8 08/08/2017 10:44 AM    Globulin 5.3 08/08/2017 10:44 AM    A-G Ratio 0.5 08/08/2017 10:44 AM    ALT (SGPT) 29 08/08/2017 10:44 AM         IMPRESSION AND PLAN:    Babs Burgess has a working diagnosis of EOC. EOD CT and  elevations would be consistent with recurrence. She is S/P six cycles of Carbo/Taxol retreatment due to prolonged DFI. Elevated . CT suggestive of progressive disease. S/P Doxil/Avastin #3. S/P Topotecan #3, complicated by anemia and thrombocytopenia  S/P IV Gemzar#5,      Screening labs  Continue chemotherapy (#6)  EOD CT to follow  Appointments scheduled.     Juan Arcos MD  8/24/2017

## 2017-08-29 ENCOUNTER — HOSPITAL ENCOUNTER (OUTPATIENT)
Dept: INFUSION THERAPY | Age: 57
Discharge: HOME | End: 2017-08-29
Payer: MEDICARE

## 2017-08-29 VITALS
HEART RATE: 72 BPM | BODY MASS INDEX: 44.41 KG/M2 | RESPIRATION RATE: 16 BRPM | SYSTOLIC BLOOD PRESSURE: 116 MMHG | WEIGHT: 293 LBS | HEIGHT: 68 IN | DIASTOLIC BLOOD PRESSURE: 71 MMHG | TEMPERATURE: 97.4 F

## 2017-08-29 LAB
ALBUMIN SERPL-MCNC: 4 G/DL (ref 3.5–5.5)
ALBUMIN/GLOB SERPL: 0.9 {RATIO} (ref 1.2–2.2)
ALP SERPL-CCNC: 83 IU/L (ref 39–117)
ALT SERPL-CCNC: 20 IU/L (ref 0–32)
AST SERPL-CCNC: 34 IU/L (ref 0–40)
BASOPHILS # BLD AUTO: 0.1 X10E3/UL (ref 0–0.2)
BASOPHILS NFR BLD AUTO: 1 %
BILIRUB SERPL-MCNC: 0.4 MG/DL (ref 0–1.2)
BUN SERPL-MCNC: 26 MG/DL (ref 6–24)
BUN/CREAT SERPL: 22 (ref 9–23)
CALCIUM SERPL-MCNC: 9.7 MG/DL (ref 8.7–10.2)
CANCER AG125 SERPL-ACNC: 183.8 U/ML (ref 0–38.1)
CHLORIDE SERPL-SCNC: 104 MMOL/L (ref 96–106)
CO2 SERPL-SCNC: 21 MMOL/L (ref 18–29)
CREAT SERPL-MCNC: 1.16 MG/DL (ref 0.57–1)
EOSINOPHIL # BLD AUTO: 0.4 X10E3/UL (ref 0–0.4)
EOSINOPHIL NFR BLD AUTO: 5 %
ERYTHROCYTE [DISTWIDTH] IN BLOOD BY AUTOMATED COUNT: 15.1 % (ref 12.3–15.4)
GLOBULIN SER CALC-MCNC: 4.4 G/DL (ref 1.5–4.5)
GLUCOSE SERPL-MCNC: 113 MG/DL (ref 65–99)
HCT VFR BLD AUTO: 29.4 % (ref 34–46.6)
HGB BLD-MCNC: 9.2 G/DL (ref 11.1–15.9)
IMM GRANULOCYTES # BLD: 0 X10E3/UL (ref 0–0.1)
IMM GRANULOCYTES NFR BLD: 0 %
LYMPHOCYTES # BLD AUTO: 2.4 X10E3/UL (ref 0.7–3.1)
LYMPHOCYTES NFR BLD AUTO: 33 %
MAGNESIUM SERPL-MCNC: 1.7 MG/DL (ref 1.6–2.3)
MCH RBC QN AUTO: 29 PG (ref 26.6–33)
MCHC RBC AUTO-ENTMCNC: 31.3 G/DL (ref 31.5–35.7)
MCV RBC AUTO: 93 FL (ref 79–97)
MONOCYTES # BLD AUTO: 1 X10E3/UL (ref 0.1–0.9)
MONOCYTES NFR BLD AUTO: 13 %
NEUTROPHILS # BLD AUTO: 3.6 X10E3/UL (ref 1.4–7)
NEUTROPHILS NFR BLD AUTO: 48 %
PLATELET # BLD AUTO: 328 X10E3/UL (ref 150–379)
POTASSIUM SERPL-SCNC: 4.6 MMOL/L (ref 3.5–5.2)
PROT SERPL-MCNC: 8.4 G/DL (ref 6–8.5)
RBC # BLD AUTO: 3.17 X10E6/UL (ref 3.77–5.28)
SODIUM SERPL-SCNC: 140 MMOL/L (ref 134–144)
WBC # BLD AUTO: 7.4 X10E3/UL (ref 3.4–10.8)

## 2017-08-29 PROCEDURE — 96375 TX/PRO/DX INJ NEW DRUG ADDON: CPT

## 2017-08-29 PROCEDURE — 74011000250 HC RX REV CODE- 250: Performed by: OBSTETRICS & GYNECOLOGY

## 2017-08-29 PROCEDURE — 96413 CHEMO IV INFUSION 1 HR: CPT

## 2017-08-29 PROCEDURE — 74011250636 HC RX REV CODE- 250/636: Performed by: OBSTETRICS & GYNECOLOGY

## 2017-08-29 PROCEDURE — 77030012965 HC NDL HUBR BBMI -A

## 2017-08-29 RX ORDER — SODIUM CHLORIDE 9 MG/ML
25 INJECTION, SOLUTION INTRAVENOUS AS NEEDED
Status: DISPENSED | OUTPATIENT
Start: 2017-08-29 | End: 2017-08-30

## 2017-08-29 RX ORDER — SODIUM CHLORIDE 9 MG/ML
10 INJECTION INTRAMUSCULAR; INTRAVENOUS; SUBCUTANEOUS AS NEEDED
Status: ACTIVE | OUTPATIENT
Start: 2017-08-29 | End: 2017-08-30

## 2017-08-29 RX ORDER — GRANISETRON HYDROCHLORIDE 1 MG/ML
1 INJECTION INTRAVENOUS ONCE
Status: COMPLETED | OUTPATIENT
Start: 2017-08-29 | End: 2017-08-29

## 2017-08-29 RX ORDER — SODIUM CHLORIDE 0.9 % (FLUSH) 0.9 %
5-10 SYRINGE (ML) INJECTION AS NEEDED
Status: ACTIVE | OUTPATIENT
Start: 2017-08-29 | End: 2017-08-30

## 2017-08-29 RX ORDER — HEPARIN 100 UNIT/ML
500 SYRINGE INTRAVENOUS AS NEEDED
Status: ACTIVE | OUTPATIENT
Start: 2017-08-29 | End: 2017-08-30

## 2017-08-29 RX ADMIN — SODIUM CHLORIDE 10 ML: 9 INJECTION INTRAMUSCULAR; INTRAVENOUS; SUBCUTANEOUS at 11:47

## 2017-08-29 RX ADMIN — SODIUM CHLORIDE, PRESERVATIVE FREE 500 UNITS: 5 INJECTION INTRAVENOUS at 12:44

## 2017-08-29 RX ADMIN — Medication 10 ML: at 11:47

## 2017-08-29 RX ADMIN — SODIUM CHLORIDE 2112 MG: 900 INJECTION, SOLUTION INTRAVENOUS at 12:10

## 2017-08-29 RX ADMIN — GRANISETRON HYDROCHLORIDE 1 MG: 1 INJECTION INTRAVENOUS at 11:46

## 2017-08-29 RX ADMIN — Medication 10 ML: at 12:44

## 2017-08-29 RX ADMIN — SODIUM CHLORIDE 25 ML/HR: 900 INJECTION, SOLUTION INTRAVENOUS at 11:46

## 2017-08-29 NOTE — PROGRESS NOTES
[facility] Note:  1100Pt arrived at [address] [place] [address] ambulatory and in no distress for C6D1    Assessment stable, no new complaints voiced. KNee L , still hurts and she takes aleve. Pt gets ECKERT with any exertion and is seeing pulmonary this week for some tests. Medications received:  Kytril  Gemzar    1300 Tolerated treatment well, no adverse reaction noted. D/Cd from [address] and in no distress accompanied by self. Next appt 9/6  1100  Visit Vitals    BP (!) 149/93    Pulse 96    Temp 96.9 °F (36.1 °C)    Resp 18    Ht 5' 8\" (1.727 m)    Wt 140.2 kg (309 lb)    LMP 09/07/2011    BMI 46.98 kg/m2     No results found for this or any previous visit (from the past 12 hour(s)). Labs done before coming. . Were checked.

## 2017-08-31 RX ORDER — GRANISETRON HYDROCHLORIDE 1 MG/ML
1 INJECTION INTRAVENOUS ONCE
Status: COMPLETED | OUTPATIENT
Start: 2017-09-06 | End: 2017-09-06

## 2017-09-06 ENCOUNTER — HOSPITAL ENCOUNTER (OUTPATIENT)
Dept: INFUSION THERAPY | Age: 57
Discharge: HOME | End: 2017-09-06
Payer: MEDICARE

## 2017-09-06 VITALS
SYSTOLIC BLOOD PRESSURE: 175 MMHG | RESPIRATION RATE: 16 BRPM | HEART RATE: 72 BPM | BODY MASS INDEX: 44.41 KG/M2 | TEMPERATURE: 97.5 F | DIASTOLIC BLOOD PRESSURE: 86 MMHG | HEIGHT: 68 IN | WEIGHT: 293 LBS

## 2017-09-06 LAB
BASOPHILS # BLD: 0 K/UL (ref 0–0.1)
BASOPHILS NFR BLD: 0 % (ref 0–1)
EOSINOPHIL # BLD: 0.4 K/UL (ref 0–0.4)
EOSINOPHIL NFR BLD: 6 % (ref 0–7)
ERYTHROCYTE [DISTWIDTH] IN BLOOD BY AUTOMATED COUNT: 14.2 % (ref 11.5–14.5)
HCT VFR BLD AUTO: 29.6 % (ref 35–47)
HGB BLD-MCNC: 9.2 G/DL (ref 11.5–16)
LYMPHOCYTES # BLD: 2.8 K/UL (ref 0.8–3.5)
LYMPHOCYTES NFR BLD: 36 % (ref 12–49)
MCH RBC QN AUTO: 28.7 PG (ref 26–34)
MCHC RBC AUTO-ENTMCNC: 31.1 G/DL (ref 30–36.5)
MCV RBC AUTO: 92.2 FL (ref 80–99)
MONOCYTES # BLD: 0.7 K/UL (ref 0–1)
MONOCYTES NFR BLD: 10 % (ref 5–13)
NEUTS SEG # BLD: 3.7 K/UL (ref 1.8–8)
NEUTS SEG NFR BLD: 48 % (ref 32–75)
PLATELET # BLD AUTO: 118 K/UL (ref 150–400)
RBC # BLD AUTO: 3.21 M/UL (ref 3.8–5.2)
WBC # BLD AUTO: 7.7 K/UL (ref 3.6–11)

## 2017-09-06 PROCEDURE — 96375 TX/PRO/DX INJ NEW DRUG ADDON: CPT

## 2017-09-06 PROCEDURE — 36415 COLL VENOUS BLD VENIPUNCTURE: CPT | Performed by: OBSTETRICS & GYNECOLOGY

## 2017-09-06 PROCEDURE — 77030012965 HC NDL HUBR BBMI -A

## 2017-09-06 PROCEDURE — 74011250636 HC RX REV CODE- 250/636: Performed by: NURSE PRACTITIONER

## 2017-09-06 PROCEDURE — 96413 CHEMO IV INFUSION 1 HR: CPT

## 2017-09-06 PROCEDURE — 74011250636 HC RX REV CODE- 250/636: Performed by: OBSTETRICS & GYNECOLOGY

## 2017-09-06 PROCEDURE — 85025 COMPLETE CBC W/AUTO DIFF WBC: CPT | Performed by: OBSTETRICS & GYNECOLOGY

## 2017-09-06 PROCEDURE — 96360 HYDRATION IV INFUSION INIT: CPT

## 2017-09-06 PROCEDURE — 96361 HYDRATE IV INFUSION ADD-ON: CPT

## 2017-09-06 RX ORDER — HEPARIN 100 UNIT/ML
500 SYRINGE INTRAVENOUS AS NEEDED
Status: ACTIVE | OUTPATIENT
Start: 2017-09-06 | End: 2017-09-07

## 2017-09-06 RX ORDER — SODIUM CHLORIDE 0.9 % (FLUSH) 0.9 %
5-10 SYRINGE (ML) INJECTION AS NEEDED
Status: ACTIVE | OUTPATIENT
Start: 2017-09-06 | End: 2017-09-07

## 2017-09-06 RX ORDER — SODIUM CHLORIDE 9 MG/ML
10 INJECTION INTRAMUSCULAR; INTRAVENOUS; SUBCUTANEOUS AS NEEDED
Status: ACTIVE | OUTPATIENT
Start: 2017-09-06 | End: 2017-09-07

## 2017-09-06 RX ADMIN — GRANISETRON HYDROCHLORIDE 1 MG: 1 INJECTION INTRAVENOUS at 13:08

## 2017-09-06 RX ADMIN — SODIUM CHLORIDE 1000 ML: 900 INJECTION, SOLUTION INTRAVENOUS at 11:58

## 2017-09-06 RX ADMIN — SODIUM CHLORIDE 2112 MG: 900 INJECTION, SOLUTION INTRAVENOUS at 13:29

## 2017-09-06 NOTE — PROGRESS NOTES
[facility]patient [facility]fusion [facility] - Chemotherapy Progress Note    1100 Pt admit to [facility] for Gemzar ambulatory in stable condition. Assessment completed. No new concerns voiced. POrt with positive blood return. Labs drawn and sent for processing. NP gave orders for hydration. Labs back and reviewed. Treatment started. Chemotherapy Flowsheet 9/6/2017   Cycle C7   Date 9/6/2017   Drug / Regimen gmzar   Dosage -   Pre Hydration given   Pre Meds given   Notes given          Visit Vitals    /86    Pulse 72    Temp 97.5 °F (36.4 °C)    Resp 16    Ht 5' 8\" (1.727 m)    Wt 141.3 kg (311 lb 9.6 oz)    LMP 09/07/2011    BMI 47.38 kg/m2       Medications:  Kytril  Gemzar  Hydration     1430 Pt tolerated treatment well. Port maintained positive blood return throughout treatment. Flushed, heparinized and de-accessed per protocol. D/c home ambulatory in no distress. Pt has finished treatment for now and will follow up with md.  .    Recent Results (from the past 12 hour(s))   CBC WITH AUTOMATED DIFF    Collection Time: 09/06/17 11:11 AM   Result Value Ref Range    WBC 7.7 3.6 - 11.0 K/uL    RBC 3.21 (L) 3.80 - 5.20 M/uL    HGB 9.2 (L) 11.5 - 16.0 g/dL    HCT 29.6 (L) 35.0 - 47.0 %    MCV 92.2 80.0 - 99.0 FL    MCH 28.7 26.0 - 34.0 PG    MCHC 31.1 30.0 - 36.5 g/dL    RDW 14.2 11.5 - 14.5 %    PLATELET 137 (L) 814 - 400 K/uL    NEUTROPHILS 48 32 - 75 %    LYMPHOCYTES 36 12 - 49 %    MONOCYTES 10 5 - 13 %    EOSINOPHILS 6 0 - 7 %    BASOPHILS 0 0 - 1 %    ABS. NEUTROPHILS 3.7 1.8 - 8.0 K/UL    ABS. LYMPHOCYTES 2.8 0.8 - 3.5 K/UL    ABS. MONOCYTES 0.7 0.0 - 1.0 K/UL    ABS. EOSINOPHILS 0.4 0.0 - 0.4 K/UL    ABS.  BASOPHILS 0.0 0.0 - 0.1 K/UL

## 2017-09-06 NOTE — PROGRESS NOTES
Lotus Cruz. Mary, JAD      Date of visit: 9/6/2017       Subjective:   Ms. Paddy Combs is a long time pt of Dr. Mercedes Stein. She was initially dx with ovarian cancer in Sept 2012. She had MARAH/BSO/Cytoreductive surgery on 9/21/12. At that time she was Stage III C. She recovered and began 6 cycles of Carbo/Taxol. Had good response, but reoccurred and in Fall of 2014 she received 3 cycles of Carbo/Taxol before having a reaction to Carbo and this was stopped. She continued her next 3 cycles with Doxil/Avastin. This did not decrease disease. .   She had a treatment break from 3/2015 until she restarted with Topatecan for 11 cycles from July 2015 through March 2016. She was followed by Dr. Mercedes Stein and in Jan 2017, she had a CT scan that showed recurrence of disease. She began treatment with gemzar q21 days on Feb 2nd and presents today for cycle 6D8, her last. She is very happy with it being completed today  Today, her hgb is noted at 9.2 (stable)  Noted her creat at 1.16 today. Denies any decrease in urination or flank pain. Says she is trying to \"drink enough water\". BUN 26 today   Says her knee \"remains the same, but I am happy Dr. Mercedes Stein said after this treatment cycle is complete, I can proceed with havening it replaced as they said they have wanted to do for a while now\". Unfortunately, her CA-125 appears to be rising. Today it is 183.8. Up from 160 on 8/8 and 157 on 7/28. Denies abd pain. She continues drinking \"green smoothies\" twice a day between meals that she says keeps her \"regular\" regarding her bowels. Denies any           Diagnosis: Ovarian cancer  Original Surgery: 9/21/12 Ex-lap, ROULA/BSO, radical tumor debulking  Pathology: High grade serious ovarian cancer with omental cake and carcinomatosis.  Stage IIIC        Current Outpatient Prescriptions   Medication Sig    KLOR-CON M10 10 mEq tablet TAKE 1 TAB BY MOUTH TWO (2) DAYS A WEEK.  valsartan (DIOVAN) 160 mg tablet Take 1 Tab by mouth daily for 90 days. Indications: hypertension    ondansetron (ZOFRAN ODT) 8 mg disintegrating tablet Take 1 Tab by mouth every eight (8) hours as needed for Nausea.  aspirin delayed-release 81 mg tablet Take  by mouth daily.  BD INSULIN PEN NEEDLE UF SHORT 31 gauge x 5/16\" ndle USE WITH INSULIN TO INJECT FIVE TIMES DAILY.  HYDROcodone-acetaminophen (NORCO) 5-325 mg per tablet Take 1 Tab by mouth every four (4) hours as needed for Pain. Max Daily Amount: 6 Tabs.  fluticasone (FLONASE) 50 mcg/actuation nasal spray     clonazePAM (KLONOPIN) 0.5 mg tablet Take 0.5 mg by mouth nightly as needed.  nystatin (MYCOSTATIN) powder Apply  to affected area four (4) times daily as needed.  OTHER,NON-FORMULARY, 2 Tabs daily (after dinner). [facility] (vitamin)    insulin aspart (NOVOLOG FLEXPEN) 100 unit/mL inpn INJECT 20 UNITS BEFORE EACH MEAL + 4 UNITS FOR EVERY 50 MG/DL ABOVE 150 MG/DL.  UNITS PER DAY    SYMBICORT 160-4.5 mcg/actuation HFA inhaler 2 Puffs two (2) times daily as needed.  omega-3 acid ethyl esters (LOVAZA) 1 gram capsule TAKE ONE CAPSULE BY MOUTH TWICE A DAY    Liraglutide (VICTOZA) 0.6 mg/0.1 mL (18 mg/3 mL) sub-q pen 0.3 mL by SubCUTAneous route daily (with lunch).  cycloSPORINE (RESTASIS) 0.05 % ophthalmic emulsion 1 Drop two (2) times a day.  sertraline (ZOLOFT) 50 mg tablet TAKE 1 TABLET BY MOUTH EVERY MORNING    LEVEMIR FLEXTOUCH 100 unit/mL (3 mL) inpn 30 UNITS IN THE MORNING AND 50 UNITS AT NIGHT. INCREASE AS DIRECTED TO MAX  UNITS PER DAY    montelukast (SINGULAIR) 10 mg tablet Take 1 Tab by mouth daily.  levothyroxine (SYNTHROID) 150 mcg tablet TAKE 1 TAB BY MOUTH DAILY (BEFORE BREAKFAST).  fluticasone-salmeterol (ADVAIR HFA) 230-21 mcg/actuation inhaler Take 2 Puffs by inhalation two (2) times a day.     albuterol (PROVENTIL VENTOLIN) 2.5 mg /3 mL (0.083 %) nebulizer solution 3 mL by Nebulization route every four (4) hours as needed for Wheezing.  cholecalciferol, VITAMIN D3, (VITAMIN D3) 5,000 unit tab tablet Take 5,000 Units by mouth daily.  furosemide (LASIX) 40 mg tablet Take 80 mg by mouth as needed.  albuterol (PROVENTIL HFA, VENTOLIN HFA, PROAIR HFA) 90 mcg/actuation inhaler Take 2 Puffs by inhalation every four (4) hours as needed for Wheezing.  fexofenadine (ALLEGRA) 180 mg tablet Take 180 mg by mouth daily. Current Facility-Administered Medications   Medication Dose Route Frequency    sodium chloride 0.9 % injection 10 mL  10 mL IntraVENous PRN    heparin (porcine) pf 500 Units  500 Units IntraVENous PRN    sodium chloride (NS) flush 5-10 mL  5-10 mL IntraVENous PRN    sodium chloride 0.9 % bolus infusion 1,000 mL  1,000 mL IntraVENous ONCE        Review of Systems:  General: She is in good spirits and her wt is stable. Fatigue persist, but she says it is stable and not getting worse. HEENT: Denies visual changes, dysphagia or headache  Resp: Denies SOB, or change in ECKERT, wheezing or cough  CV: Denies CP, palpitations. GI/: Denies N/V, diarrhea, constipation or dysuria   Musk[place]el:Continues with DJD knee pain. Says it has not been \" as bad lately\". She said she was told she needed a TKR, but has not gotten it yet. Continues to take tylenol for pain when needed and rest leg  Neuro: Denies dizzyness or syncope    Objective:     Patient Vitals for the past 8 hrs:   BP Temp Pulse Resp Height Weight   09/06/17 1424 175/86 - 72 - - -   09/06/17 1107 146/84 97.5 °F (36.4 °C) 96 16 5' 8\" (1.727 m) 311 lb 9.6 oz (141.3 kg)       Physical Exam:  General: A&O X3 in NAD with pleasant affect. HEENT: Sclera anicteric, pupils equal and reactive to light. Mucosa pink, slightly dry again today  Neck: No JVD. No cervical adenopathy appreciated. Heart: Regular with persistent 2-3/6 murmur  Lungs: CTA Bilat without wheezing or rales.  Decreased to bases due to effort, improves with encouragement of taking deep breath   Abd: Soft, obese, NT/ND with + BS throughout. No CVA tenderness noted  Ext: Without edema and + pedal pulses bilat  Neuro: grossly intact    Recent Results (from the past 12 hour(s))   CBC WITH AUTOMATED DIFF    Collection Time: 09/06/17 11:11 AM   Result Value Ref Range    WBC 7.7 3.6 - 11.0 K/uL    RBC 3.21 (L) 3.80 - 5.20 M/uL    HGB 9.2 (L) 11.5 - 16.0 g/dL    HCT 29.6 (L) 35.0 - 47.0 %    MCV 92.2 80.0 - 99.0 FL    MCH 28.7 26.0 - 34.0 PG    MCHC 31.1 30.0 - 36.5 g/dL    RDW 14.2 11.5 - 14.5 %    PLATELET 338 (L) 344 - 400 K/uL    NEUTROPHILS 48 32 - 75 %    LYMPHOCYTES 36 12 - 49 %    MONOCYTES 10 5 - 13 %    EOSINOPHILS 6 0 - 7 %    BASOPHILS 0 0 - 1 %    ABS. NEUTROPHILS 3.7 1.8 - 8.0 K/UL    ABS. LYMPHOCYTES 2.8 0.8 - 3.5 K/UL    ABS. MONOCYTES 0.7 0.0 - 1.0 K/UL    ABS. EOSINOPHILS 0.4 0.0 - 0.4 K/UL    ABS. BASOPHILS 0.0 0.0 - 0.1 K/UL           CT ABD/Pelvis without contrast 4/4/2017:  FINDINGS:   LUNG BASES: Clear. INCIDENTALLY IMAGED HEART AND MEDIASTINUM: Enlarged pericardiophrenic and  diaphragmatic lymph nodes. LIVER: No mass or biliary dilatation. GALLBLADDER: Unremarkable. SPLEEN: No mass. PANCREAS: No mass or ductal dilatation. ADRENALS: Unremarkable. KIDNEYS/URETERS: Bilateral nephrolithiasis. There is soft tissue density in the  region of the left renal hilum.     STOMACH: Unremarkable. SMALL BOWEL: No dilatation or wall thickening. COLON: No dilatation or wall thickening. APPENDIX: Unremarkable. PERITONEUM: There multiple enlarged lymph nodes within the mesentery. There is  significant pelvic adenopathy and an enlarged right groin lymph node. RETROPERITONEUM: No aneurysm. REPRODUCTIVE ORGANS:  URINARY BLADDER: No mass or calculus. BONES: No destructive bone lesion. ADDITIONAL COMMENTS: N/A     IMPRESSION  IMPRESSION:     1. Bilateral nephrolithiasis. 2. Soft tissue density left renal hilum.  It would be helpful to obtain a CT with  contrast.  3. No significant change in multiple enlarged abdominal and pelvic lymph nodes. CTA 7/18/2017 :     FINDINGS: This is a good quality study for the evaluation of pulmonary embolism  to the first subsegmental arterial level. There is no pulmonary embolism to this  level.      The right chest Port-A-Cath terminates in the SVC. Aorta Cardiac size is within  normal limits. No pericardial effusion.      There is mediastinal lymphadenopathy with a representative prevascular lymph  node measuring 2.5 x 3.0 cm (series 3, image 84), previously 2.6 x 2.9 cm. Soft  tissue mass in the anterior right chest wall measures 2.7 x 3.9 cm (series 3,  image 77), previously 2.7 x 3.7 cm. A representative right pericardial lymph  node measures 2.2 x 2.3 cm (series 3, image 44), previously 1.9 x 2.5 cm.     There are mild patchy bilateral groundglass opacities. There is no lung mass or  consolidation. No pleural effusion or pneumothorax. Central airways are  unremarkable.     Upper abdominal lymphadenopathy is grossly unchanged. There is no aggressive  blastic or lytic bony lesion.     IMPRESSION  IMPRESSION:   1. There is no acute pulmonary embolism.     2. Mediastinal lymphadenopathy, upper abdominal lymphadenopathy, and chest wall  soft tissue metastatic disease is overall unchanged.     3. Mild patchy bilateral groundglass opacities in the lungs may represent  pulmonary edema or nonspecific infection or inflammation. Attention on follow-up  is recommended. Pathology:   FINAL PATHOLOGIC DIAGNOSIS  1. Omentum, omentectomy:  Serous carcinoma, high grade  2. Soft tissue, umbilicus, biopsy:  Serous carcinoma, high grade  3. Peritoneum, periappendiceal, biopsy:  Serous carcinoma, high grade  4. Colonic mesentery, biopsy:  Serous carcinoma, high grade  See comment  5.  Uterus (157 grams), ovaries and fallopian tubes, supracervical hysterectomy and bilateral salpingooophorectomy:  Cervix: No diagnostic pathologic abnormality  Myometrium: No diagnostic pathologic abnormality  Endometrium: Benign polyp and cystic atrophy  Ovaries and fallopian tubes: Serous carcinoma, high grade  Serosa: Serous carcinoma implants, high-grade  See synoptic report  Synoptic Report:  Specimen: Uterus, ovaries and fallopian tubes, omentum  Procedure: Supracervical hysterectomy, bilateral salpingo-oophorectomy, omentectomy  Lymph node sampling: No lymph nodes present  Specimen integrity:  Right ovary: Intact        Assessment:     Patient Active Problem List   Diagnosis Code    Histor of ovarian cancer Z85.43    Carcinomatosis ([address] C80.0    Controlled type 2 diabetes mellitus without complication, with long-term current use of insulin ([address] E11.9, Z79.4    Morbid obesity ([address] E66.01    Abnormal mammogram R92.8    Sleep apnea G47.30    Dyslipidemia (high LDL; low HDL) E78.4    Benign essential hypertension I10    Disorder of thyroid E07.9    CINV (chemotherapy-induced nausea and vomiting) R11.2, T45.1X5A    Swelling of eye, bilateral--left > right H57.8    Portacath in place--left Z95.828    Chest pain, atypical R07.89    Hx of pulmonary embolus during pregnancy Z86.711, Z87.59    Elevated CA-125 R97.1    Ovarian cancer (HCC) C56.9    Hyperglycemia due to type 2 diabetes mellitus ([address] E11.65    Preop cardiovascular exam Z01.810    Angina pectoris associated with type 2 diabetes mellitus (HCC) E11.59, I20.9    ARF (acute renal failure) (HCC) N17.9    Renal calculi N20.0    Elevated BUN R79.9    Pulmonary hypertension, mild (HCC) I27.2    UTI (urinary tract infection) N39.0    Wheezing R06.2    Lymphadenopathy, mediastinal R59.0         Plan: Will proceed with C6D8 of Gemzar  She will follow up with urologist as per his office.   She will let us know for any darkening or change in her urine  Encouraged to drink pleanty of water   Will hydrate with extra liter of fluid again today   Pt is scheduled for CT scan on 9/18 and she will follow up with Dr. Anamaria Chaudhari after test.   Follow up with cardiologist as prn  Follow up with Pulmonology as directed and obtain PFT's/sleep study as instructed  If she has ANY chest pain or associated pain or concern, she was instructed to proceed directly to the ER  Follow up with PCP re-DM/HTN/Lipids as scheduled  Follow up with orthopedic surgeon for potential TKR after Sept.   Continue current meds  Hydration encouraged  Encouraged to call for any concerns or questions  Osmany Zuniga NP

## 2017-09-11 RX ORDER — POTASSIUM CHLORIDE 750 MG/1
10 TABLET, EXTENDED RELEASE ORAL 2 TIMES DAILY
Qty: 90 TAB | Refills: 3 | Status: SHIPPED | OUTPATIENT
Start: 2017-09-11 | End: 2017-09-14 | Stop reason: SDUPTHER

## 2017-09-14 RX ORDER — ALBUTEROL SULFATE 0.83 MG/ML
SOLUTION RESPIRATORY (INHALATION)
Qty: 3 PACKAGE | Refills: 1 | Status: SHIPPED | OUTPATIENT
Start: 2017-09-14 | End: 2018-11-27

## 2017-09-14 RX ORDER — POTASSIUM CHLORIDE 750 MG/1
10 TABLET, EXTENDED RELEASE ORAL 2 TIMES DAILY
Qty: 90 TAB | Refills: 3 | Status: SHIPPED | OUTPATIENT
Start: 2017-09-14 | End: 2017-12-05

## 2017-09-18 ENCOUNTER — HOSPITAL ENCOUNTER (OUTPATIENT)
Dept: CT IMAGING | Age: 57
Discharge: HOME | End: 2017-09-18
Attending: OBSTETRICS & GYNECOLOGY

## 2017-09-18 DIAGNOSIS — C80.0 CARCINOMATOSIS (HCC): ICD-10-CM

## 2017-09-18 DIAGNOSIS — C56.9 OVARIAN CANCER, UNSPECIFIED LATERALITY (HCC): ICD-10-CM

## 2017-09-18 DIAGNOSIS — R97.1 ELEVATED CA-125: ICD-10-CM

## 2017-09-20 DIAGNOSIS — Z79.4 CONTROLLED TYPE 2 DIABETES MELLITUS WITHOUT COMPLICATION, WITH LONG-TERM CURRENT USE OF INSULIN (HCC): ICD-10-CM

## 2017-09-20 DIAGNOSIS — C56.9 OVARIAN CANCER, UNSPECIFIED LATERALITY (HCC): ICD-10-CM

## 2017-09-20 DIAGNOSIS — C80.0 CARCINOMATOSIS (HCC): ICD-10-CM

## 2017-09-20 DIAGNOSIS — E11.9 CONTROLLED TYPE 2 DIABETES MELLITUS WITHOUT COMPLICATION, WITH LONG-TERM CURRENT USE OF INSULIN (HCC): ICD-10-CM

## 2017-09-20 DIAGNOSIS — Z91.041 CONTRAST MEDIA ALLERGY: Primary | ICD-10-CM

## 2017-09-20 RX ORDER — DIPHENHYDRAMINE HCL 25 MG
CAPSULE ORAL
Qty: 1 CAP | Refills: 0 | Status: SHIPPED | OUTPATIENT
Start: 2017-09-20 | End: 2017-09-27 | Stop reason: ALTCHOICE

## 2017-09-20 RX ORDER — PREDNISONE 10 MG/1
TABLET ORAL
Qty: 15 TAB | Refills: 0 | Status: SHIPPED | OUTPATIENT
Start: 2017-09-20 | End: 2017-09-26 | Stop reason: ALTCHOICE

## 2017-09-22 ENCOUNTER — HOSPITAL ENCOUNTER (OUTPATIENT)
Dept: CT IMAGING | Age: 57
Discharge: HOME | End: 2017-09-22
Attending: OBSTETRICS & GYNECOLOGY
Payer: MEDICARE

## 2017-09-22 PROCEDURE — 74011000258 HC RX REV CODE- 258: Performed by: OBSTETRICS & GYNECOLOGY

## 2017-09-22 PROCEDURE — 74011636320 HC RX REV CODE- 636/320: Performed by: OBSTETRICS & GYNECOLOGY

## 2017-09-22 PROCEDURE — 74177 CT ABD & PELVIS W/CONTRAST: CPT

## 2017-09-22 RX ORDER — SODIUM CHLORIDE 0.9 % (FLUSH) 0.9 %
10 SYRINGE (ML) INJECTION
Status: COMPLETED | OUTPATIENT
Start: 2017-09-22 | End: 2017-09-22

## 2017-09-22 RX ADMIN — IOHEXOL 50 ML: 240 INJECTION, SOLUTION INTRATHECAL; INTRAVASCULAR; INTRAVENOUS; ORAL at 07:21

## 2017-09-22 RX ADMIN — Medication 10 ML: at 09:00

## 2017-09-22 RX ADMIN — SODIUM CHLORIDE 100 ML: 900 INJECTION, SOLUTION INTRAVENOUS at 09:00

## 2017-09-22 RX ADMIN — IOPAMIDOL 100 ML: 755 INJECTION, SOLUTION INTRAVENOUS at 09:00

## 2017-09-26 ENCOUNTER — OFFICE VISIT (OUTPATIENT)
Dept: GYNECOLOGY | Age: 57
End: 2017-09-26

## 2017-09-26 VITALS
SYSTOLIC BLOOD PRESSURE: 107 MMHG | HEART RATE: 80 BPM | WEIGHT: 293 LBS | DIASTOLIC BLOOD PRESSURE: 69 MMHG | HEIGHT: 68 IN | BODY MASS INDEX: 44.41 KG/M2

## 2017-09-26 DIAGNOSIS — C80.0 CARCINOMATOSIS (HCC): Primary | ICD-10-CM

## 2017-09-26 DIAGNOSIS — C56.9 OVARIAN CANCER, UNSPECIFIED LATERALITY (HCC): ICD-10-CM

## 2017-09-26 DIAGNOSIS — E66.01 MORBID OBESITY DUE TO EXCESS CALORIES (HCC): ICD-10-CM

## 2017-09-26 DIAGNOSIS — R97.1 ELEVATED CA-125: ICD-10-CM

## 2017-09-26 DIAGNOSIS — M25.561 CHRONIC PAIN OF RIGHT KNEE: ICD-10-CM

## 2017-09-26 DIAGNOSIS — G89.29 CHRONIC PAIN OF RIGHT KNEE: ICD-10-CM

## 2017-09-26 RX ORDER — HYDROCODONE BITARTRATE AND ACETAMINOPHEN 5; 325 MG/1; MG/1
1 TABLET ORAL
Qty: 30 TAB | Refills: 0 | Status: SHIPPED | OUTPATIENT
Start: 2017-09-26 | End: 2018-05-01 | Stop reason: SDUPTHER

## 2017-09-26 NOTE — PROGRESS NOTES
Nallely Downing. JAD Hernandez      Date of visit: 9/26/2017       Subjective:   Ms. Lui Long is a long time pt of Dr. Cali Crocker. She was initially dx with ovarian cancer in Sept 2012. She had MARAH/BSO/Cytoreductive surgery on 9/21/12. At that time she was Stage III C. She recovered and began 6 cycles of Carbo/Taxol. Had good response, but reoccurred and in Fall of 2014 she received 3 cycles of Carbo/Taxol before having a reaction to Carbo and this was stopped. She continued her next 3 cycles with Doxil/Avastin. This did not decrease disease. .   She had a treatment break from 3/2015 until she restarted with Topatecan for 11 cycles from July 2015 through March 2016. She was followed by Dr. Cali Crocker and in Jan 2017, she had a CT scan that showed recurrence of disease. She began treatment with gemzar q21 days on Feb 2nd, six cycles completed 9/6/2017   Denies any decrease in urination or flank pain. Says she is trying to \"drink enough water\". BUN 26 today   Says her knee \"remains the same, but I am happy Dr. Cali Crocker said after this treatment cycle is complete, I can proceed with havening it replaced as they said they have wanted to do for a while now\". Component      Latest Ref Rng & Units 8/28/2017 8/8/2017 7/28/2017          12:25 PM 10:44 AM 10:01 AM   Cancer Ag (CA) 125      0.0 - 38.1 U/mL 183.8 (H)  157.4 (H)   CA-125      0 - 30 U/mL  160 (H)      Results for Jose Angel Jimenez (MRN [id number]) as of 9/26/2017 15:28   Ref. Range 7/19/2017 05:15 7/28/2017 10:01 8/8/2017 10:44 8/28/2017 12:25   Creatinine Latest Ref Range: 0.57 - 1.00 mg/dL 1.39 (H) 1.25 (H) 1.03 (H) 1.16 (H)   BUN/Creatinine ratio Latest Ref Range: 9 - 23  21 (H) 13 17 22       Denies abd pain. She continues drinking \"green smoothies\" twice a day between meals that she says keeps her \"regular\" regarding her bowels.  Denies any     Diagnosis: Ovarian cancer  Original Surgery: 9/21/12 Ex-lap, ROULA/BSO, radical tumor debulking  Pathology: High grade serious ovarian cancer with omental cake and carcinomatosis. Stage IIIC        Current Outpatient Prescriptions   Medication Sig    diphenhydrAMINE (BENADRYL) 25 mg capsule Take one tab one hour before your scan (7:30 am 9/22)    potassium chloride (KLOR-CON M10) 10 mEq tablet Take 1 Tab by mouth two (2) times a day.  aspirin delayed-release 81 mg tablet Take  by mouth daily.  BD INSULIN PEN NEEDLE UF SHORT 31 gauge x 5/16\" ndle USE WITH INSULIN TO INJECT FIVE TIMES DAILY.  fluticasone (FLONASE) 50 mcg/actuation nasal spray     clonazePAM (KLONOPIN) 0.5 mg tablet Take 0.5 mg by mouth nightly as needed.  nystatin (MYCOSTATIN) powder Apply  to affected area four (4) times daily as needed.  OTHER,NON-FORMULARY, 2 Tabs daily (after dinner). Eye Promise (vitamin)    insulin aspart (NOVOLOG FLEXPEN) 100 unit/mL inpn INJECT 20 UNITS BEFORE EACH MEAL + 4 UNITS FOR EVERY 50 MG/DL ABOVE 150 MG/DL.  UNITS PER DAY    SYMBICORT 160-4.5 mcg/actuation HFA inhaler 2 Puffs two (2) times daily as needed.  omega-3 acid ethyl esters (LOVAZA) 1 gram capsule TAKE ONE CAPSULE BY MOUTH TWICE A DAY    Liraglutide (VICTOZA) 0.6 mg/0.1 mL (18 mg/3 mL) sub-q pen 0.3 mL by SubCUTAneous route daily (with lunch).  cycloSPORINE (RESTASIS) 0.05 % ophthalmic emulsion 1 Drop two (2) times a day.  sertraline (ZOLOFT) 50 mg tablet TAKE 1 TABLET BY MOUTH EVERY MORNING    LEVEMIR FLEXTOUCH 100 unit/mL (3 mL) inpn 30 UNITS IN THE MORNING AND 50 UNITS AT NIGHT. INCREASE AS DIRECTED TO MAX  UNITS PER DAY    montelukast (SINGULAIR) 10 mg tablet Take 1 Tab by mouth daily.  levothyroxine (SYNTHROID) 150 mcg tablet TAKE 1 TAB BY MOUTH DAILY (BEFORE BREAKFAST).  fluticasone-salmeterol (ADVAIR HFA) 230-21 mcg/actuation inhaler Take 2 Puffs by inhalation two (2) times a day.     cholecalciferol, VITAMIN D3, (VITAMIN D3) 5,000 unit tab tablet Take 5,000 Units by mouth daily.  furosemide (LASIX) 40 mg tablet Take 80 mg by mouth as needed.  albuterol (PROVENTIL HFA, VENTOLIN HFA, PROAIR HFA) 90 mcg/actuation inhaler Take 2 Puffs by inhalation every four (4) hours as needed for Wheezing.  fexofenadine (ALLEGRA) 180 mg tablet Take 180 mg by mouth daily.  albuterol (PROVENTIL VENTOLIN) 2.5 mg /3 mL (0.083 %) nebulizer solution USE 1 VAIL VIA NEBULIZER EVERY 4 HOURS AS NEEDED FOR WHEEZING    valsartan (DIOVAN) 160 mg tablet Take 1 Tab by mouth daily for 90 days. Indications: hypertension    ondansetron (ZOFRAN ODT) 8 mg disintegrating tablet Take 1 Tab by mouth every eight (8) hours as needed for Nausea.  HYDROcodone-acetaminophen (NORCO) 5-325 mg per tablet Take 1 Tab by mouth every four (4) hours as needed for Pain. Max Daily Amount: 6 Tabs. No current facility-administered medications for this visit. Review of Systems:  General: She is in good spirits and her wt is stable. Fatigue persist, but she says it is stable and not getting worse. HEENT: Denies visual changes, dysphagia or headache  Resp: Denies SOB, or change in ECKERT, wheezing or cough  CV: Denies CP, palpitations. GI/: Denies N/V, diarrhea, constipation or dysuria   Musk[place]el:Continues with DJD knee pain. Says it has not been \" as bad lately\". She said she was told she needed a TKR, but has not gotten it yet. Continues to take tylenol for pain when needed and rest leg  Neuro: Denies dizzyness or syncope    Objective:     Patient Vitals for the past 8 hrs:   BP Temp Pulse Resp Height Weight   09/06/17 1424 175/86 - 72 - - -   09/06/17 1107 146/84 97.5 °F (36.4 °C) 96 16 5' 8\" (1.727 m) 311 lb 9.6 oz (141.3 kg)       Physical Exam: Prior office examination. General: A&O X3 in NAD with pleasant affect. HEENT: Sclera anicteric, pupils equal and reactive to light. Mucosa pink, slightly dry again today  Neck: No JVD. No cervical adenopathy appreciated.   Heart: Regular with persistent 2-3/6 murmur  Lungs: CTA Bilat without wheezing or rales. Decreased to bases due to effort, improves with encouragement of taking deep breath   Abd: Soft, obese, NT/ND with + BS throughout. No CVA tenderness noted  Ext: Without edema and + pedal pulses bilat  Neuro: grossly intact    No results found for this or any previous visit (from the past 12 hour(s)). CT ABD/Pelvis without contrast 4/4/2017:  FINDINGS:   LUNG BASES: Clear. INCIDENTALLY IMAGED HEART AND MEDIASTINUM: Enlarged pericardiophrenic and  diaphragmatic lymph nodes. LIVER: No mass or biliary dilatation. GALLBLADDER: Unremarkable. SPLEEN: No mass. PANCREAS: No mass or ductal dilatation. ADRENALS: Unremarkable. KIDNEYS/URETERS: Bilateral nephrolithiasis. There is soft tissue density in the  region of the left renal hilum.     STOMACH: Unremarkable. SMALL BOWEL: No dilatation or wall thickening. COLON: No dilatation or wall thickening. APPENDIX: Unremarkable. PERITONEUM: There multiple enlarged lymph nodes within the mesentery. There is  significant pelvic adenopathy and an enlarged right groin lymph node. RETROPERITONEUM: No aneurysm. REPRODUCTIVE ORGANS:  URINARY BLADDER: No mass or calculus. BONES: No destructive bone lesion. ADDITIONAL COMMENTS: N/A     IMPRESSION  IMPRESSION:     1. Bilateral nephrolithiasis. 2. Soft tissue density left renal hilum. It would be helpful to obtain a CT with  contrast.  3. No significant change in multiple enlarged abdominal and pelvic lymph nodes. CTA 7/18/2017 :     FINDINGS: This is a good quality study for the evaluation of pulmonary embolism  to the first subsegmental arterial level. There is no pulmonary embolism to this  level.      The right chest Port-A-Cath terminates in the SVC. Aorta Cardiac size is within  normal limits.  No pericardial effusion.      There is mediastinal lymphadenopathy with a representative prevascular lymph  node measuring 2.5 x 3.0 cm (series 3, image 84), previously 2.6 x 2.9 cm. Soft  tissue mass in the anterior right chest wall measures 2.7 x 3.9 cm (series 3,  image 77), previously 2.7 x 3.7 cm. A representative right pericardial lymph  node measures 2.2 x 2.3 cm (series 3, image 44), previously 1.9 x 2.5 cm.     There are mild patchy bilateral groundglass opacities. There is no lung mass or  consolidation. No pleural effusion or pneumothorax. Central airways are  unremarkable.     Upper abdominal lymphadenopathy is grossly unchanged. There is no aggressive  blastic or lytic bony lesion.     IMPRESSION  IMPRESSION:   1. There is no acute pulmonary embolism.     2. Mediastinal lymphadenopathy, upper abdominal lymphadenopathy, and chest wall  soft tissue metastatic disease is overall unchanged.     3. Mild patchy bilateral groundglass opacities in the lungs may represent  pulmonary edema or nonspecific infection or inflammation. Attention on follow-up  is recommended. Pathology:   FINAL PATHOLOGIC DIAGNOSIS  1. Omentum, omentectomy:  Serous carcinoma, high grade  2. Soft tissue, umbilicus, biopsy:  Serous carcinoma, high grade  3. Peritoneum, periappendiceal, biopsy:  Serous carcinoma, high grade  4. Colonic mesentery, biopsy:  Serous carcinoma, high grade  See comment  5.  Uterus (157 grams), ovaries and fallopian tubes, supracervical hysterectomy and bilateral salpingooophorectomy:  Cervix: No diagnostic pathologic abnormality  Myometrium: No diagnostic pathologic abnormality  Endometrium: Benign polyp and cystic atrophy  Ovaries and fallopian tubes: Serous carcinoma, high grade  Serosa: Serous carcinoma implants, high-grade  See synoptic report  Synoptic Report:  Specimen: Uterus, ovaries and fallopian tubes, omentum  Procedure: Supracervical hysterectomy, bilateral salpingo-oophorectomy, omentectomy  Lymph node sampling: No lymph nodes present  Specimen integrity:  Right ovary: Intact        Assessment:     Patient Active Problem List Diagnosis Code    Histor of ovarian cancer Z85.43    Carcinomatosis ([facility] [address] C80.0    Controlled type 2 diabetes mellitus without complication, with long-term current use of insulin (HCC) E11.9, Z79.4    Morbid obesity ([facility] [address] E66.01    Abnormal mammogram R92.8    Sleep apnea G47.30    Dyslipidemia (high LDL; low HDL) E78.4    Benign essential hypertension I10    Disorder of thyroid E07.9    CINV (chemotherapy-induced nausea and vomiting) R11.2, T45.1X5A    Swelling of eye, bilateral--left > right H57.8    Portacath in place--left Z95.828    Chest pain, atypical R07.89    Hx of pulmonary embolus during pregnancy Z86.711, Z87.59    Elevated CA-125 R97.1    Ovarian cancer (HCC) C56.9    Hyperglycemia due to type 2 diabetes mellitus ([facility] [address] E11.65    Preop cardiovascular exam Z01.810    Angina pectoris associated with type 2 diabetes mellitus (HCC) E11.59, I20.9    ARF (acute renal failure) (HCC) N17.9    Renal calculi N20.0    Elevated BUN R79.9    Pulmonary hypertension, mild (HCC) I27.2    UTI (urinary tract infection) N39.0    Wheezing R06.2    Lymphadenopathy, mediastinal R59.0         Plan:     Recvommend consieration of Parb inhibitor Rx. Rationale, risks, toxicities and palliative nature of therapy noted. CDDP may be a option. Patient wants to consider options.   Will return in two weeks to discuss    Encouraged to call for any concerns or questions  Sammi Victoria MD

## 2017-09-26 NOTE — PROGRESS NOTES
Ct scan results, pt reports no abnormal spotting or bleeding, Patient states she is no longer taking the following medications: Norco and Diovan

## 2017-09-27 ENCOUNTER — OFFICE VISIT (OUTPATIENT)
Dept: CARDIOLOGY CLINIC | Age: 57
End: 2017-09-27

## 2017-09-27 VITALS
DIASTOLIC BLOOD PRESSURE: 81 MMHG | HEIGHT: 68 IN | HEART RATE: 72 BPM | OXYGEN SATURATION: 95 % | SYSTOLIC BLOOD PRESSURE: 147 MMHG | WEIGHT: 293 LBS | BODY MASS INDEX: 44.41 KG/M2

## 2017-09-27 DIAGNOSIS — E78.5 DYSLIPIDEMIA (HIGH LDL; LOW HDL): ICD-10-CM

## 2017-09-27 DIAGNOSIS — E11.9 CONTROLLED TYPE 2 DIABETES MELLITUS WITHOUT COMPLICATION, WITH LONG-TERM CURRENT USE OF INSULIN (HCC): ICD-10-CM

## 2017-09-27 DIAGNOSIS — Z87.59 HX OF PULMONARY EMBOLUS DURING PREGNANCY: ICD-10-CM

## 2017-09-27 DIAGNOSIS — Z79.4 CONTROLLED TYPE 2 DIABETES MELLITUS WITHOUT COMPLICATION, WITH LONG-TERM CURRENT USE OF INSULIN (HCC): ICD-10-CM

## 2017-09-27 DIAGNOSIS — I10 BENIGN ESSENTIAL HYPERTENSION: Primary | ICD-10-CM

## 2017-09-27 DIAGNOSIS — G47.33 OBSTRUCTIVE SLEEP APNEA SYNDROME: ICD-10-CM

## 2017-09-27 DIAGNOSIS — E66.01 MORBID OBESITY DUE TO EXCESS CALORIES (HCC): ICD-10-CM

## 2017-09-27 DIAGNOSIS — Z86.711 HX OF PULMONARY EMBOLUS DURING PREGNANCY: ICD-10-CM

## 2017-09-27 RX ORDER — DIPHENHYDRAMINE HCL 25 MG
25 CAPSULE ORAL AS NEEDED
Status: ON HOLD | COMMUNITY
Start: 2017-09-20 | End: 2018-07-17

## 2017-09-27 NOTE — MR AVS SNAPSHOT
Visit Information Date & Time Provider Department Dept. Phone Encounter #  
 9/27/2017  9:00 AM Sherry Merino MD [facility] [phone] [id number] Your Appointments 10/10/2017  8:30 AM  
2 WEEK with Brit Colón MD  
[facility] Gynecologic Oncology Jeffery Goldman) Appt Note: 2 week check [address] [id number]  
[address] Upcoming Health Maintenance Date Due Hepatitis C Screening 1960 Pneumococcal 19-64 Highest Risk (1 of 3 - PCV13) 7/28/1980 DTaP/Tdap/Td series (1 - Tdap) 7/28/1982 COLONOSCOPY 10/14/2016 INFLUENZA AGE 9 TO ADULT 8/1/2017 PAP AKA CERVICAL CYTOLOGY 8/29/2017 FOOT EXAM Q1 9/2/2017 EYE EXAM RETINAL OR DILATED Q1 11/15/2017 HEMOGLOBIN A1C Q6M 1/19/2018 MICROALBUMIN Q1 2/27/2018 LIPID PANEL Q1 2/27/2018 MEDICARE YEARLY EXAM 8/3/2018 BREAST CANCER SCRN MAMMOGRAM 9/22/2018 Allergies as of 9/27/2017  Review Complete On: 9/26/2017 By: Brit Colón MD  
  
 Severity Noted Reaction Type Reactions Iodinated Contrast- Oral And Iv Dye High 09/22/2017   Topical Rash 13 hr pre-medication prior to IV contrast  
 Carboplatin  08/29/2014    Other (comments) Patient developed shortness of breath, felt faint, coughing, skin flushed and \"itchy\". This occurred on the patients 8th treatment. Lipitor [Atorvastatin]  04/04/2014    Myalgia Percocet [Oxycodone-acetaminophen]  03/25/2012   Not Verified Other (comments) fever Shellfish Containing Products  10/04/2012    Hives Tape [Adhesive]  03/16/2017    Itching, Other (comments) \"op site-- clear,thin tape\"--caused blister Current Immunizations  Reviewed on 8/29/2017 Name Date Influenza Vaccine (Quad) PF 12/13/2016 Not reviewed this visit You Were Diagnosed With   
  
 Codes Comments Benign essential hypertension    -  Primary ICD-10-CM: I10 
ICD-9-CM: 401.1 Vitals BP Pulse Height(growth percentile) Weight(growth percentile) LMP SpO2  
 147/81 72 5' 8\" (1.727 m) 314 lb (142.4 kg) 09/07/2011 95% BMI OB Status Smoking Status 47.74 kg/m2 Hysterectomy Never Smoker Vitals History BMI and BSA Data Body Mass Index Body Surface Area 47.74 kg/m 2 2.61 m 2 Preferred Pharmacy Pharmacy Name Phone [id number] [place], [facility] [phone] Your Updated Medication List  
  
   
This list is accurate as of: 9/27/17  9:52 AM.  Always use your most recent med list.  
  
  
  
  
 * albuterol 90 mcg/actuation inhaler Commonly known as:  PROVENTIL HFA, VENTOLIN HFA, PROAIR HFA Take 2 Puffs by inhalation every four (4) hours as needed for Wheezing. * albuterol 2.5 mg /3 mL (0.083 %) nebulizer solution Commonly known as:  PROVENTIL VENTOLIN  
USE 1 VAIL VIA NEBULIZER EVERY 4 HOURS AS NEEDED FOR WHEEZING  
  
 ALLEGRA 180 mg tablet Generic drug:  fexofenadine Take 180 mg by mouth daily. aspirin delayed-release 81 mg tablet Take  by mouth daily. BD INSULIN PEN NEEDLE UF SHORT 31 gauge x 5/16\" Ndle Generic drug:  Insulin Needles (Disposable) USE WITH INSULIN TO INJECT FIVE TIMES DAILY. cholecalciferol (VITAMIN D3) 5,000 unit Tab tablet Commonly known as:  VITAMIN D3 Take 5,000 Units by mouth daily. clonazePAM 0.5 mg tablet Commonly known as:  Evangelina Resendiz Take 0.5 mg by mouth nightly as needed. diphenhydrAMINE 25 mg capsule Commonly known as:  BENADRYL  
  
 fluticasone 50 mcg/actuation nasal spray Commonly known as:  FLONASE  
  
 fluticasone-salmeterol 230-21 mcg/actuation inhaler Commonly known as:  ADVAIR HFA Take 2 Puffs by inhalation two (2) times a day. furosemide 40 mg tablet Commonly known as:  LASIX Take 80 mg by mouth as needed. HYDROcodone-acetaminophen 5-325 mg per tablet Commonly known as:  Brittanie Cleveland Take 1 Tab by mouth every four (4) hours as needed for Pain. Max Daily Amount: 6 Tabs. insulin aspart 100 unit/mL Inpn Commonly known as:  Calixto Marteller INJECT 20 UNITS BEFORE EACH MEAL + 4 UNITS FOR EVERY 50 MG/DL ABOVE 150 MG/DL.  UNITS PER DAY  
  
 LEVEMIR FLEXTOUCH 100 unit/mL (3 mL) Inpn Generic drug:  insulin detemir 30 UNITS IN THE MORNING AND 50 UNITS AT NIGHT. INCREASE AS DIRECTED TO MAX  UNITS PER DAY  
  
 levothyroxine 150 mcg tablet Commonly known as:  SYNTHROID  
TAKE 1 TAB BY MOUTH DAILY (BEFORE BREAKFAST). Liraglutide 0.6 mg/0.1 mL (18 mg/3 mL) Pnij Commonly known as:  VICTOZA  
0.3 mL by SubCUTAneous route daily (with lunch). montelukast 10 mg tablet Commonly known as:  SINGULAIR Take 1 Tab by mouth daily. nystatin powder Commonly known as:  MYCOSTATIN Apply  to affected area four (4) times daily as needed. omega-3 acid ethyl esters 1 gram capsule Commonly known as:  Janet Mins TAKE ONE CAPSULE BY MOUTH TWICE A DAY  
  
 ondansetron 8 mg disintegrating tablet Commonly known as:  ZOFRAN ODT Take 1 Tab by mouth every eight (8) hours as needed for Nausea. OTHER(NON-FORMULARY) 2 Tabs daily (after dinner). Eye Promise (vitamin) potassium chloride 10 mEq tablet Commonly known as:  KLOR-CON M10 Take 1 Tab by mouth two (2) times a day. RESTASIS 0.05 % ophthalmic emulsion Generic drug:  cycloSPORINE  
1 Drop two (2) times a day. sertraline 50 mg tablet Commonly known as:  ZOLOFT  
TAKE 1 TABLET BY MOUTH EVERY MORNING  
  
 SYMBICORT 160-4.5 mcg/actuation Hfaa Generic drug:  budesonide-formoterol 2 Puffs two (2) times daily as needed. valsartan 160 mg tablet Commonly known as:  DIOVAN Take 1 Tab by mouth daily for 90 days. Indications: hypertension * Notice: This list has 2 medication(s) that are the same as other medications prescribed for you. Read the directions carefully, and ask your doctor or other care provider to review them with you. We Performed the Following AMB POC EKG ROUTINE W/ 12 LEADS, INTER & REP [36938 CPT(R)] Introducing [facility] SERVICES! Dear Cyndy Mccall: Thank you for requesting a [employer] account. Our records indicate that you already have an active [employer] account. You can access your account anytime at [email or web address] Did you know that you can access your hospital and ER discharge instructions at any time in [employer]? You can also review all of your test results from your hospital stay or ER visit. Additional Information If you have questions, please visit the Frequently Asked Questions section of the [employer] website at [email or web address] Remember, [employer] is NOT to be used for urgent needs. For medical emergencies, dial 911. Now available from your iPhone and Android! Please provide this summary of care documentation to your next provider. Your primary care clinician is listed as Clara Duque. If you have any questions after today's visit, please call [phone].

## 2017-09-28 NOTE — PROGRESS NOTES
[facility]   [address] [facility], [address] [place] [address] Road                                          NEW PATIENT HPI/FOLLOW-UP      NAME:  Vale Degroot   :   1960   MRN:   [id number]   PCP:  Brian Meyer MD           Subjective: The patient is a 65y.o. year old female  who returns for a routine follow-up. Since the last visit, patient reports no new symptoms. Denies change in exercise tolerance, chest pain, edema, medication intolerance, palpitations, shortness of breath, PND/orthopnea wheezing, sputum, syncope, dizziness or light headedness. Doing satisfactorily. Review of Systems  General: Pt denies excessive weight gain or loss. Pt is able to conduct ADL's. Respiratory: Denies shortness of breath, ECKERT, wheezing or stridor. Cardiovascular: Denies precordial pain, palpitations, edema or PND  Gastrointestinal: Denies poor appetite, indigestion, abdominal pain or blood in stool  Peripheral vascular: Denies claudication, leg cramps  Neuropsychiatric: Denies paresthesias,tingling,numbness,anxiety,depression,fatigue  Musculoskeletal: Denies pain,tenderness, soreness,swelling      Past Medical History:   Diagnosis Date    Adverse effect of anesthesia     sleep apnea cpap machine useage     Anemia     Arthritis     DDD low back and knees    Asthma     uses albuterol prn only; none x 6 mos.   Never hospitalized    BRCA negative 2013    Calculus of kidney     Chronic kidney disease     kidney stones    Chronic pain     knee pain bilat    CINV (chemotherapy-induced nausea and vomiting) 2014    Diabetes ([address] Age 46    IDDM    Environmental allergies     Fibromyalgia     GERD (gastroesophageal reflux disease)     controlled with med    Hypertension     Ill-defined condition     Sepsis     Ill-defined condition 2016    chemotherapy     Morbid obesity ([address]     Other and unspecified hyperlipidemia     Ovarian cancer ([address] 2012, 2014    serous, high grade, stage IIIc. s/p chemotx (has port)    Psychiatric disorder     Rectal bleeding     Thromboembolus ([address]     POST PARTUM; resolved- PELVIS    Thyroid disease     HYPOTHYROID    Unspecified sleep apnea     CPAP, Dr. Liz Guo     Patient Active Problem List    Diagnosis Date Noted    Wheezing 2017    Lymphadenopathy, mediastinal 2017    UTI (urinary tract infection) 2017    Pulmonary hypertension, mild ([address] 2017    ARF (acute renal failure) ([address] 2017    Renal calculi 2017    Elevated BUN 2017    Hyperglycemia due to type 2 diabetes mellitus ([address] 02/15/2017    Preop cardiovascular exam 02/15/2017    Angina pectoris associated with type 2 diabetes mellitus ([address] 02/15/2017    Ovarian cancer ([address] 2015    Elevated CA-125 2015    Swelling of eye, bilateral--left > right 2015    Portacath in place--left 2015    Chest pain, atypical 2015    Hx of pulmonary embolus during pregnancy 2015    CINV (chemotherapy-induced nausea and vomiting) 2014    Benign essential hypertension 2014    Disorder of thyroid 2014    Dyslipidemia (high LDL; low HDL)     Sleep apnea 2014    Abnormal mammogram 01/15/2014    Histor of ovarian cancer 2012    Carcinomatosis ([address] 2012    Controlled type 2 diabetes mellitus without complication, with long-term current use of insulin ([address] 2012    Morbid obesity ([address] 2012      Past Surgical History:   Procedure Laterality Date    BREAST SURGERY PROCEDURE UNLISTED      Lymph node in left breast 2017    CHEST SURGERY PROCEDURE UNLISTED      Placement of port a cath right chest    HX GYN       X2    HX HYSTERECTOMY  2012    ROULA/BSO, tumor debulking, omentectomy for ovarian cancer    HX ORTHOPAEDIC      ankle-FX, R    HX ORTHOPAEDIC      FX R ARM    HX UROLOGICAL      stent    HX VASCULAR ACCESS  2015    right chest- port placed     Allergies   Allergen Reactions    Iodinated Contrast- Oral And Iv Dye Rash     13 hr pre-medication prior to IV contrast    Carboplatin Other (comments)     Patient developed shortness of breath, felt faint, coughing, skin flushed and \"itchy\". This occurred on the patients 8th treatment.  Lipitor [Atorvastatin] Myalgia    Percocet [Oxycodone-Acetaminophen] Other (comments)     fever    Shellfish Containing Products Hives    Tape [Adhesive] Itching and Other (comments)     \"op site-- clear,thin tape\"--caused blister       Family History   Problem Relation Age of Onset    Hypertension Mother     Arthritis-osteo Mother     Lung Disease Father      COPD    Hypertension Father     Arthritis-osteo Father     Hypertension Brother     Elevated Lipids Brother     Arthritis-osteo Brother     Hypertension Brother     Elevated Lipids Brother     Obesity Brother     Cancer Brother      PROSTATE    Anesth Problems Son      DELAYED AWAKENING    Diabetes Maternal Grandmother       Social History     Social History    Marital status:      Spouse name: N/A    Number of children: N/A    Years of education: N/A     Occupational History    Not on file. Social History Main Topics    Smoking status: Never Smoker    Smokeless tobacco: Never Used    Alcohol use No    Drug use: Yes     Special: Prescription, OTC    Sexual activity: Yes     Other Topics Concern    Not on file     Social History Narrative    Lives in [facility] alone.  passed away in 5/16 of a heart attack. Has 2 sons. Disability. 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 Enjoys swimming and going to the gym. Current Outpatient Prescriptions   Medication Sig    HYDROcodone-acetaminophen (NORCO) 5-325 mg per tablet Take 1 Tab by mouth every four (4) hours as needed for Pain. Max Daily Amount: 6 Tabs.  potassium chloride (KLOR-CON M10) 10 mEq tablet Take 1 Tab by mouth two (2) times a day.  albuterol (PROVENTIL VENTOLIN) 2.5 mg /3 mL (0.083 %) nebulizer solution USE 1 VAIL VIA NEBULIZER EVERY 4 HOURS AS NEEDED FOR WHEEZING    valsartan (DIOVAN) 160 mg tablet Take 1 Tab by mouth daily for 90 days. Indications: hypertension    ondansetron (ZOFRAN ODT) 8 mg disintegrating tablet Take 1 Tab by mouth every eight (8) hours as needed for Nausea.  aspirin delayed-release 81 mg tablet Take  by mouth daily.  BD INSULIN PEN NEEDLE UF SHORT 31 gauge x 5/16\" ndle USE WITH INSULIN TO INJECT FIVE TIMES DAILY.  fluticasone (FLONASE) 50 mcg/actuation nasal spray     clonazePAM (KLONOPIN) 0.5 mg tablet Take 0.5 mg by mouth nightly as needed.  nystatin (MYCOSTATIN) powder Apply  to affected area four (4) times daily as needed.  OTHER,NON-FORMULARY, 2 Tabs daily (after dinner). [facility] (vitamin)    insulin aspart (NOVOLOG FLEXPEN) 100 unit/mL inpn INJECT 20 UNITS BEFORE EACH MEAL + 4 UNITS FOR EVERY 50 MG/DL ABOVE 150 MG/DL.  UNITS PER DAY    SYMBICORT 160-4.5 mcg/actuation HFA inhaler 2 Puffs two (2) times daily as needed.  omega-3 acid ethyl esters (LOVAZA) 1 gram capsule TAKE ONE CAPSULE BY MOUTH TWICE A DAY    Liraglutide (VICTOZA) 0.6 mg/0.1 mL (18 mg/3 mL) sub-q pen 0.3 mL by SubCUTAneous route daily (with lunch).  cycloSPORINE (RESTASIS) 0.05 % ophthalmic emulsion 1 Drop two (2) times a day.  sertraline (ZOLOFT) 50 mg tablet TAKE 1 TABLET BY MOUTH EVERY MORNING    LEVEMIR FLEXTOUCH 100 unit/mL (3 mL) inpn 30 UNITS IN THE MORNING AND 50 UNITS AT NIGHT. INCREASE AS DIRECTED TO MAX  UNITS PER DAY    montelukast (SINGULAIR) 10 mg tablet Take 1 Tab by mouth daily.  levothyroxine (SYNTHROID) 150 mcg tablet TAKE 1 TAB BY MOUTH DAILY (BEFORE BREAKFAST).  fluticasone-salmeterol (ADVAIR HFA) 230-21 mcg/actuation inhaler Take 2 Puffs by inhalation two (2) times a day.  cholecalciferol, VITAMIN D3, (VITAMIN D3) 5,000 unit tab tablet Take 5,000 Units by mouth daily.  furosemide (LASIX) 40 mg tablet Take 80 mg by mouth as needed.  albuterol (PROVENTIL HFA, VENTOLIN HFA, PROAIR HFA) 90 mcg/actuation inhaler Take 2 Puffs by inhalation every four (4) hours as needed for Wheezing.  fexofenadine (ALLEGRA) 180 mg tablet Take 180 mg by mouth daily.  diphenhydrAMINE (BENADRYL) 25 mg capsule      No current facility-administered medications for this visit. I have reviewed the nurses notes, vitals, problem list, allergy list, medical history, family medical, social history and medications. Objective:     Physical Exam:     Vitals:    09/27/17 0916   BP: 147/81   Pulse: 72   SpO2: 95%   Weight: 314 lb (142.4 kg)   Height: 5' 8\" (1.727 m)    Body mass index is 47.74 kg/(m^2). General: Well developed, in no acute distress. HEENT: No carotid bruits, no JVD, trach is midline. Heart:  Normal S1/S2 negative S3 or S4. Regular, no murmur, gallop or rub.   Respiratory: Clear bilaterally, no wheezing or rales  Abdomen:   Soft, non-tender, bowel sounds are active.   Extremities:  No edema, normal cap refill, no cyanosis. Neuro: A&Ox3, speech clear, gait stable. Skin: Skin color is normal. No rashes or lesions. No diaphoresis. Vascular: 2+ pulses symmetric in all extremities        Data Review:       Cardiographics:    EKG: NSR, clockwise rotation. SUMMARY:  Left ventricle: Size was at the upper limits of normal. Systolic function  was normal. Ejection fraction was estimated to be 60 %. There were no  regional wall motion abnormalities. There was mild concentric hypertrophy. Right ventricle: The size was at the upper limits of normal.    Left atrium: The atrium was mildly dilated. Right atrium: The atrium was mildly dilated. Tricuspid valve: There was mild regurgitation. There was mild pulmonary  Hypertension.     Study Result   INDICATION:  Chest pain, acute coronary syndrome suspect; ANGINA  Encounter for  preprocedural cardiovascular examination    EXAM: Nuclear Myocardial Perfusion SPECT and Gated Wall Motion Imaging.      Rest and Lexiscan myocardial perfusion SPECT imaging is performed with IV  injections of 30 and 30 mCi technetium 99m Sestamibi respectively.     SPECT images displayed in three planes show uniform radiotracer uptake in the  myocardium on both sequences. There is no ischemic reversibility. There is no  apparent infarct fixed defect.      Gated SPECT images reviewed in 3 planes show appropriate LV systolic wall  thickening. There is no segmental wall motion abnormality of the left  ventricular myocardium. The calculated LV ejection fraction is 53%. Pulmonary  uptake and left ventricular cavity size appear normal.        IMPRESSION  IMPRESSION: No ischemia demonstrated. No infarct visible.  LVEF 53%.             Cardiology Labs:    Results for orders placed or performed during the hospital encounter of 07/18/17   EKG, 12 LEAD, INITIAL   Result Value Ref Range    Ventricular Rate 64 BPM    Atrial Rate 64 BPM    P-R Interval 168 ms    QRS Duration 88 ms    Q-T Interval 412 ms    QTC Calculation (Bezet) 425 ms    Calculated P Axis 66 degrees    Calculated R Axis -3 degrees    Calculated T Axis 13 degrees    Diagnosis       Normal sinus rhythm    When compared with ECG of 18-JUL-2017 11:34,  No significant change was found  Confirmed by Denise Mejia M.D., Shanna Neely ([id number]) on 7/19/2017 10:40:54 AM         Lab Results   Component Value Date/Time    Cholesterol, total 241 02/27/2017 11:39 AM    HDL Cholesterol 34 02/27/2017 11:39 AM    LDL, calculated 169 02/27/2017 11:39 AM    Triglyceride 189 02/27/2017 11:39 AM       Lab Results   Component Value Date/Time    Sodium 140 08/28/2017 12:25 PM    Potassium 4.6 08/28/2017 12:25 PM    Chloride 104 08/28/2017 12:25 PM    CO2 21 08/28/2017 12:25 PM    Anion gap 9 08/08/2017 10:44 AM    Glucose 113 08/28/2017 12:25 PM    BUN 26 08/28/2017 12:25 PM    Creatinine 1.16 08/28/2017 12:25 PM    BUN/Creatinine ratio 22 08/28/2017 12:25 PM    GFR est AA 61 08/28/2017 12:25 PM    GFR est non-AA 53 08/28/2017 12:25 PM    Calcium 9.7 08/28/2017 12:25 PM    Bilirubin, total 0.4 08/28/2017 12:25 PM    AST (SGOT) 34 08/28/2017 12:25 PM    Alk. phosphatase 83 08/28/2017 12:25 PM    Protein, total 8.4 08/28/2017 12:25 PM    Albumin 4.0 08/28/2017 12:25 PM    Globulin 5.3 08/08/2017 10:44 AM    A-G Ratio 0.9 08/28/2017 12:25 PM    ALT (SGPT) 20 08/28/2017 12:25 PM          Assessment:       ICD-10-CM ICD-9-CM    1. Benign essential hypertension I10 401.1 AMB POC EKG ROUTINE W/ 12 LEADS, INTER & REP   2. Dyslipidemia (high LDL; low HDL) E78.4 272.4    3. Morbid obesity due to excess calories (HCC) E66.01 278.01    4. Obstructive sleep apnea syndrome G47.33 327.23    5. Hx of pulmonary embolus during pregnancy Z86.711 V12.51     Z87.59     6. Controlled type 2 diabetes mellitus without complication, with long-term current use of insulin (HCC) E11.9 250.00     Z79.4 V58.67          Discussion: Patient presents at this time stable from a cardiac perspective. NST and Echo in 3/17 unremarkable. Reassured. Pleased with present cardiac status. Plan: 1. Continue same meds. Lipid profile and labs followed by PCP. 2.Encouraged to exercise to tolerance, lose weight and follow low fat, low cholesterol, low sodium predominantly Plant-based (consider Mediterranean) diet. Call with questions or concerns. Will follow up any test results by phone and/or f/u here in office if needed. Perfecto Mccormack 3.Follow up: 6 months    I have discussed the diagnosis with the patient and the intended plan as seen in the above orders. The patient has received an after-visit summary and questions were answered concerning future plans. I have discussed any concerning medication side effects and warnings with the patient as well.     Boogie Wu MD  9/28/2017

## 2017-10-01 RX ORDER — LEVOTHYROXINE SODIUM 150 UG/1
TABLET ORAL
Qty: 90 TAB | Refills: 0 | Status: SHIPPED | OUTPATIENT
Start: 2017-10-01 | End: 2017-12-26 | Stop reason: SDUPTHER

## 2017-10-10 ENCOUNTER — OFFICE VISIT (OUTPATIENT)
Dept: GYNECOLOGY | Age: 57
End: 2017-10-10

## 2017-10-10 VITALS
DIASTOLIC BLOOD PRESSURE: 80 MMHG | SYSTOLIC BLOOD PRESSURE: 124 MMHG | HEIGHT: 68 IN | HEART RATE: 75 BPM | BODY MASS INDEX: 44.41 KG/M2 | WEIGHT: 293 LBS

## 2017-10-10 DIAGNOSIS — Z79.4 CONTROLLED TYPE 2 DIABETES MELLITUS WITHOUT COMPLICATION, WITH LONG-TERM CURRENT USE OF INSULIN (HCC): ICD-10-CM

## 2017-10-10 DIAGNOSIS — C80.0 CARCINOMATOSIS (HCC): Primary | ICD-10-CM

## 2017-10-10 DIAGNOSIS — R97.1 ELEVATED CA-125: ICD-10-CM

## 2017-10-10 DIAGNOSIS — E66.01 MORBID OBESITY DUE TO EXCESS CALORIES (HCC): ICD-10-CM

## 2017-10-10 DIAGNOSIS — C56.9 OVARIAN CANCER, UNSPECIFIED LATERALITY (HCC): ICD-10-CM

## 2017-10-10 DIAGNOSIS — E11.9 CONTROLLED TYPE 2 DIABETES MELLITUS WITHOUT COMPLICATION, WITH LONG-TERM CURRENT USE OF INSULIN (HCC): ICD-10-CM

## 2017-10-10 NOTE — PROGRESS NOTES
Niecy Marion. JAD Hernandez      Date of visit: 10/10/2017       Subjective:   Ms. Susan Esteban is a long time pt of Dr. Simi Cisse. She was initially dx with ovarian cancer in Sept 2012. She had MARAH/BSO/Cytoreductive surgery on 9/21/12. Pathology:   FINAL PATHOLOGIC DIAGNOSIS  1. Omentum, omentectomy:  Serous carcinoma, high grade  2. Soft tissue, umbilicus, biopsy:  Serous carcinoma, high grade  3. Peritoneum, periappendiceal, biopsy:  Serous carcinoma, high grade  4. Colonic mesentery, biopsy:  Serous carcinoma, high grade  See comment  5. Uterus (157 grams), ovaries and fallopian tubes, supracervical hysterectomy and bilateral salpingooophorectomy:  Cervix: No diagnostic pathologic abnormality  Myometrium: No diagnostic pathologic abnormality  Endometrium: Benign polyp and cystic atrophy  Ovaries and fallopian tubes: Serous carcinoma, high grade  Serosa: Serous carcinoma implants, high-grade  See synoptic report  Synoptic Report:  Specimen: Uterus, ovaries and fallopian tubes, omentum  Procedure: Supracervical hysterectomy, bilateral salpingo-oophorectomy, omentectomy  Lymph node sampling: No lymph nodes present  Specimen integrity:  Right ovary: Intact    At that time she was Stage III C. She recovered and began 6 cycles of Carbo/Taxol. Had good response, but reoccurred and in Fall of 2014     She received 3 cycles of Carbo/Taxol(retreatment) before having a reaction to Carbo and this was stopped. She continued her next 3 cycles with Doxil/Avastin. 3/2015-5/2015. Tolerated poorly with progressive disease documented (CT)  She had a treatment break from 3/2015 until she started with Topatecan for 11 cycles from 7/2015 through 3/2016  .    She was followed by Dr. Smii Cisse and in Jan 2017, she again demonstrated progressive, yet asymptomatic disease via CT scan    She began treatment with Gemzar q21 days on Feb 2nd, six cycles completed 9/6/2017    EOD CT: 9/28/2017  FINDINGS:   LUNG BASES: Dependent atelectasis. Otherwise clear. INCIDENTALLY IMAGED HEART AND MEDIASTINUM: Unremarkable. LIVER: No mass or biliary dilatation. GALLBLADDER: Unremarkable. SPLEEN: No mass. PANCREAS: No mass or ductal dilatation. ADRENALS: Unremarkable. KIDNEYS: Nonobstructing stones measuring 2 to 3 mm in both kidneys with at least  7 right and 3 left stones. Left renal hilar fullness seen on prior unenhanced CT  is no longer evident and I suspect this was due to mild hydronephrosis and renal  pelvis distention. STOMACH: Unremarkable. SMALL BOWEL: No dilatation or wall thickening. COLON: No dilatation or wall thickening. APPENDIX: Unremarkable. PERITONEUM: Multiple peritoneal implants are redemonstrated with a 16 x 22 mm  lesion anterior to the level of the liver, multiple left omental lesions more  inferiorly measuring up to 2 x 2.4 cm, and multiple implants along the inferior  margin of the right lower liver measuring up to 3.4 x 3.6 cm. Additionally there  is a implant in the right paracolic gutter measuring 2.1 x 2.2 cm. Intrapelvic  masses measure up to 3.5 x 4.0 cm and the rectovaginal space. RETROPERITONEUM: Extensive bulky adenopathy with rocio hepatis adenopathy  measuring up to 5.0 x 6.5 cm and a mass along the tail of the pancreas measuring  proximally 4.0 x 5.2 cm. Aortocaval adenopathy measures up to 3.6 x 5.0 cm. Numerous pelvic adenopathy measures up to 3.3 x 5.2 cm in the left external  iliac chain and 2.7 x 2.0 cm in the right internal iliac chain. REPRODUCTIVE ORGANS: Uterus and ovaries are surgically absent. URINARY BLADDER: No mass or calculus. BONES: Stable degenerative spine change with endplate sclerosis and  benign-appearing lucencies within the L2 and L4 vertebral bodies, likely  reflecting hemangioma.   ADDITIONAL COMMENTS: There is skin thickening in the anterior lower abdominal  wall bilaterally and expected appearance to abdominal postsurgical incisional  change in the midline.     IMPRESSION:  1. Left renal hilar fullness is likely due to left renal pelvis distention on  prior exam which has since resolved. 2. Extensive intra-abdominal and nkechi metastatic disease appears grossly stable  as described above. 3. Intra-abdominal wall skin thickening may reflect cellulitis. Correlate with  clinical exam.      10/10/2017:  The patient is seen today at a two week interval, following evidence of refractory disease, to further discuss consideration of palliative treatment. Parb inhibitor therapy was discussed at the prior office visit as well as IV CDDP. The patient remains essentially asymptomatic. Active, no restrictions. Normal bowel and bladder function. No N/V. Good appetite. Negative cardiopulmonary review. Component      Latest Ref Rng & Units 8/28/2017 8/8/2017 7/28/2017          12:25 PM 10:44 AM 10:01 AM   Cancer Ag (CA) 125      0.0 - 38.1 U/mL 183.8 (H)  157.4 (H)   CA-125      0 - 30 U/mL  160 (H)      Results for Lori Carranza (MRN [id number]) as of 9/26/2017 15:28   Ref. Range 7/19/2017 05:15 7/28/2017 10:01 8/8/2017 10:44 8/28/2017 12:25   Creatinine Latest Ref Range: 0.57 - 1.00 mg/dL 1.39 (H) 1.25 (H) 1.03 (H) 1.16 (H)   BUN/Creatinine ratio Latest Ref Range: 9 - 23  21 (H) 13 17 22         Current Outpatient Prescriptions   Medication Sig    levothyroxine (SYNTHROID) 150 mcg tablet TAKE 1 TAB BY MOUTH DAILY (BEFORE BREAKFAST). --[facility] [phone] FOR APPOINTMENT FOR MORE REFILLS    insulin detemir (LEVEMIR FLEXTOUCH) 100 unit/mL (3 mL) inpn INJECT 20 UNITS BEFORE EACH MEAL + 4 UNITS FOR EVERY 50 MG/DL ABOVE 150 MG/DL.  UNITS PER DAY    diphenhydrAMINE (BENADRYL) 25 mg capsule     HYDROcodone-acetaminophen (NORCO) 5-325 mg per tablet Take 1 Tab by mouth every four (4) hours as needed for Pain. Max Daily Amount: 6 Tabs.     potassium chloride (KLOR-CON M10) 10 mEq tablet Take 1 Tab by mouth two (2) times a day.    albuterol (PROVENTIL VENTOLIN) 2.5 mg /3 mL (0.083 %) nebulizer solution USE 1 VAIL VIA NEBULIZER EVERY 4 HOURS AS NEEDED FOR WHEEZING    valsartan (DIOVAN) 160 mg tablet Take 1 Tab by mouth daily for 90 days. Indications: hypertension    ondansetron (ZOFRAN ODT) 8 mg disintegrating tablet Take 1 Tab by mouth every eight (8) hours as needed for Nausea.  aspirin delayed-release 81 mg tablet Take  by mouth daily.  BD INSULIN PEN NEEDLE UF SHORT 31 gauge x 5/16\" ndle USE WITH INSULIN TO INJECT FIVE TIMES DAILY.  fluticasone (FLONASE) 50 mcg/actuation nasal spray     clonazePAM (KLONOPIN) 0.5 mg tablet Take 0.5 mg by mouth nightly as needed.  nystatin (MYCOSTATIN) powder Apply  to affected area four (4) times daily as needed.  OTHER,NON-FORMULARY, 2 Tabs daily (after dinner). [facility] (vitamin)    insulin aspart (NOVOLOG FLEXPEN) 100 unit/mL inpn INJECT 20 UNITS BEFORE EACH MEAL + 4 UNITS FOR EVERY 50 MG/DL ABOVE 150 MG/DL.  UNITS PER DAY    SYMBICORT 160-4.5 mcg/actuation HFA inhaler 2 Puffs two (2) times daily as needed.  omega-3 acid ethyl esters (LOVAZA) 1 gram capsule TAKE ONE CAPSULE BY MOUTH TWICE A DAY    Liraglutide (VICTOZA) 0.6 mg/0.1 mL (18 mg/3 mL) sub-q pen 0.3 mL by SubCUTAneous route daily (with lunch).  cycloSPORINE (RESTASIS) 0.05 % ophthalmic emulsion 1 Drop two (2) times a day.  sertraline (ZOLOFT) 50 mg tablet TAKE 1 TABLET BY MOUTH EVERY MORNING    montelukast (SINGULAIR) 10 mg tablet Take 1 Tab by mouth daily.  fluticasone-salmeterol (ADVAIR HFA) 230-21 mcg/actuation inhaler Take 2 Puffs by inhalation two (2) times a day.  cholecalciferol, VITAMIN D3, (VITAMIN D3) 5,000 unit tab tablet Take 5,000 Units by mouth daily.  furosemide (LASIX) 40 mg tablet Take 80 mg by mouth as needed.  albuterol (PROVENTIL HFA, VENTOLIN HFA, PROAIR HFA) 90 mcg/actuation inhaler Take 2 Puffs by inhalation every four (4) hours as needed for Wheezing.     fexofenadine (ALLEGRA) 180 mg tablet Take 180 mg by mouth daily. No current facility-administered medications for this visit. Review of Systems:  General: She is in good spirits and her wt is stable. Fatigue persist, but she says it is stable and not getting worse. HEENT: Denies visual changes, dysphagia or headache  Resp: Denies SOB, or change in ECKERT, wheezing or cough  CV: Denies CP, palpitations. GI/: Denies N/V, diarrhea, constipation or dysuria   MuskSkel:Continues with DJD knee pain. Says it has not been \" as bad lately\". She said she was told she needed a TKR, but has not gotten it yet. Continues to take tylenol for pain when needed and rest leg  Neuro: Denies dizzyness or syncope    Objective:     Patient Vitals for the past 8 hrs:   BP Temp Pulse Resp Height Weight   09/06/17 1424 175/86 - 72 - - -   09/06/17 1107 146/84 97.5 °F (36.4 °C) 96 16 5' 8\" (1.727 m) 311 lb 9.6 oz (141.3 kg)       Physical Exam: Prior office examination. General: A&O X3 in NAD with pleasant affect. HEENT: Sclera anicteric, pupils equal and reactive to light. Mucosa pink, slightly dry again today  Neck: No JVD. No cervical adenopathy appreciated. Heart: Regular with persistent 2-3/6 murmur  Lungs: CTA Bilat without wheezing or rales. Decreased to bases due to effort, improves with encouragement of taking deep breath   Abd: Soft, obese, NT/ND with + BS throughout. No CVA tenderness noted  Ext: Without edema and + pedal pulses bilat  Neuro: grossly intact    No results found for this or any previous visit (from the past 12 hour(s)).       Assessment:     Patient Active Problem List   Diagnosis Code    Histor of ovarian cancer Z85.43    Carcinomatosis ([address] C80.0    Controlled type 2 diabetes mellitus without complication, with long-term current use of insulin ([address] E11.9, Z79.4    Morbid obesity ([address] E66.01    Abnormal mammogram R92.8    Sleep apnea G47.30    Dyslipidemia (high LDL; low HDL) E78.4  Benign essential hypertension I10    Disorder of thyroid E07.9    CINV (chemotherapy-induced nausea and vomiting) R11.2, T45.1X5A    Swelling of eye, bilateral--left > right H57.8    Portacath in place--left Z95.828    Chest pain, atypical R07.89    Hx of pulmonary embolus during pregnancy Z86.711, Z87.59    Elevated CA-125 R97.1    Ovarian cancer (HCC) C56.9    Hyperglycemia due to type 2 diabetes mellitus ([address] E11.65    Preop cardiovascular exam Z01.810    Angina pectoris associated with type 2 diabetes mellitus (HCC) E11.59, I20.9    ARF (acute renal failure) (HCC) N17.9    Renal calculi N20.0    Elevated BUN R79.9    Pulmonary hypertension, mild I27.20    UTI (urinary tract infection) N39.0    Wheezing R06.2    Lymphadenopathy, mediastinal R59.0         Plan:     The patient anticipates a consultation with Destini Garrett. Will collate records for the consultation  Anticipate follow-up visit to discuss this consultation and treatment options/decisions. All questions answered.     Encouraged to call for any concerns or questions  Dago Lorenz MD

## 2017-10-10 NOTE — PROGRESS NOTES
Follow up visit to discuss treatment options, pt states she has developed 3 white bumps in her vaginal area, Initial blood pressure reading 145/85, repeat blood pressure reading 124/80

## 2017-10-11 ENCOUNTER — TELEPHONE (OUTPATIENT)
Dept: GYNECOLOGY | Age: 57
End: 2017-10-11

## 2017-10-11 NOTE — TELEPHONE ENCOUNTER
I called pt to check if she had scheduled her appt with Sacihn Hernandez, so we could schedule a f/u with Dr. Emmanuel Leary. She states Sachin Hernandez has a  coming to her home this afternoon to assist with scheduling this appt with them and getting medical records. Pt will call back with her appt date and time.

## 2017-10-18 RX ORDER — OMEGA-3-ACID ETHYL ESTERS 1 G/1
CAPSULE, LIQUID FILLED ORAL
Qty: 180 CAP | Refills: 2 | Status: SHIPPED | OUTPATIENT
Start: 2017-10-18 | End: 2018-07-07 | Stop reason: SDUPTHER

## 2017-10-24 ENCOUNTER — TELEPHONE (OUTPATIENT)
Dept: GYNECOLOGY | Age: 57
End: 2017-10-24

## 2017-10-24 NOTE — TELEPHONE ENCOUNTER
Pt , she is requesting chemo records from 2012 to present to be faxed to the [address] [place] [address] at [phone] in [place]. I advised pt that we need a written request and it could take 7-10 days due to we would get that info from [facility].   Juana advised that pt was given this info a couple of weeks ago and she was told a written request was needed and it could take up to 7-10 days

## 2017-11-09 ENCOUNTER — TELEPHONE (OUTPATIENT)
Dept: GYNECOLOGY | Age: 57
End: 2017-11-09

## 2017-11-09 ENCOUNTER — TELEPHONE (OUTPATIENT)
Dept: INTERNAL MEDICINE CLINIC | Age: 57
End: 2017-11-09

## 2017-11-09 NOTE — TELEPHONE ENCOUNTER
11/09/17-PT would like to come by and  a copy of her Advance Directive. Please advise. Thanks.  Jossue Newman

## 2017-11-09 NOTE — TELEPHONE ENCOUNTER
Call completed to patient, two patient identifiers verified. Patient advised a copy of her ADP was printed and is available for pick-up in our office. Patient requested the document be mailed to her home. Address verified. ACP mailed.

## 2017-11-09 NOTE — TELEPHONE ENCOUNTER
Pt went to see [facility], she has schedule an appt with Dr. Brett Sexton to discuss on 11/14/17.   We have not received her records from there, I called and advised her we need those records before he can see her on the 14th, pt verbalized understanding and will call them and have records faxed to our office

## 2017-11-12 RX ORDER — VALSARTAN 160 MG/1
TABLET ORAL
Qty: 90 TAB | Refills: 0 | Status: SHIPPED | OUTPATIENT
Start: 2017-11-12 | End: 2017-12-26 | Stop reason: DRUGHIGH

## 2017-11-14 ENCOUNTER — OFFICE VISIT (OUTPATIENT)
Dept: GYNECOLOGY | Age: 57
End: 2017-11-14

## 2017-11-14 VITALS
BODY MASS INDEX: 44.41 KG/M2 | SYSTOLIC BLOOD PRESSURE: 125 MMHG | WEIGHT: 293 LBS | DIASTOLIC BLOOD PRESSURE: 74 MMHG | HEART RATE: 82 BPM | HEIGHT: 68 IN

## 2017-11-14 DIAGNOSIS — C56.9 OVARIAN CANCER, UNSPECIFIED LATERALITY (HCC): Primary | ICD-10-CM

## 2017-11-14 DIAGNOSIS — C80.0 CARCINOMATOSIS (HCC): ICD-10-CM

## 2017-11-14 DIAGNOSIS — E66.01 MORBID OBESITY (HCC): ICD-10-CM

## 2017-11-14 NOTE — PROGRESS NOTES
Joi Bruno. Mary, JAD      Date of visit: 11/14/2017       Subjective:   Ms. Anna Pedersen is a long time pt of Dr. Jaime Galaviz. She was initially dx with ovarian cancer in Sept 2012. She had MARAH/BSO/Cytoreductive surgery on 9/21/12. Pathology:   FINAL PATHOLOGIC DIAGNOSIS  1. Omentum, omentectomy:  Serous carcinoma, high grade  2. Soft tissue, umbilicus, biopsy:  Serous carcinoma, high grade  3. Peritoneum, periappendiceal, biopsy:  Serous carcinoma, high grade  4. Colonic mesentery, biopsy:  Serous carcinoma, high grade  See comment  5. Uterus (157 grams), ovaries and fallopian tubes, supracervical hysterectomy and bilateral salpingooophorectomy:  Cervix: No diagnostic pathologic abnormality  Myometrium: No diagnostic pathologic abnormality  Endometrium: Benign polyp and cystic atrophy  Ovaries and fallopian tubes: Serous carcinoma, high grade  Serosa: Serous carcinoma implants, high-grade  See synoptic report  Synoptic Report:  Specimen: Uterus, ovaries and fallopian tubes, omentum  Procedure: Supracervical hysterectomy, bilateral salpingo-oophorectomy, omentectomy  Lymph node sampling: No lymph nodes present  Specimen integrity:  Right ovary: Intact    At that time she was Stage III C. She recovered and began 6 cycles of Carbo/Taxol. Had good response, but reoccurred and in Fall of 2014     She received 3 cycles of Carbo/Taxol(retreatment) before having a reaction to Carbo and this was stopped. She continued her next 3 cycles with Doxil/Avastin. 3/2015-5/2015. Tolerated poorly with progressive disease documented (CT)  She had a treatment break from 3/2015 until she started with Topatecan for 11 cycles from 7/2015 through 3/2016  .    She was followed by Dr. Jaime Galaviz and in Jan 2017, she again demonstrated progressive, yet asymptomatic disease via CT scan    She began treatment with Gemzar q21 days on Feb 2nd, six cycles completed 9/6/2017    EOD CT: 9/28/2017  FINDINGS:   LUNG BASES: Dependent atelectasis. Otherwise clear. INCIDENTALLY IMAGED HEART AND MEDIASTINUM: Unremarkable. LIVER: No mass or biliary dilatation. GALLBLADDER: Unremarkable. SPLEEN: No mass. PANCREAS: No mass or ductal dilatation. ADRENALS: Unremarkable. KIDNEYS: Nonobstructing stones measuring 2 to 3 mm in both kidneys with at least  7 right and 3 left stones. Left renal hilar fullness seen on prior unenhanced CT  is no longer evident and I suspect this was due to mild hydronephrosis and renal  pelvis distention. STOMACH: Unremarkable. SMALL BOWEL: No dilatation or wall thickening. COLON: No dilatation or wall thickening. APPENDIX: Unremarkable. PERITONEUM: Multiple peritoneal implants are redemonstrated with a 16 x 22 mm  lesion anterior to the level of the liver, multiple left omental lesions more  inferiorly measuring up to 2 x 2.4 cm, and multiple implants along the inferior  margin of the right lower liver measuring up to 3.4 x 3.6 cm. Additionally there  is a implant in the right paracolic gutter measuring 2.1 x 2.2 cm. Intrapelvic  masses measure up to 3.5 x 4.0 cm and the rectovaginal space. RETROPERITONEUM: Extensive bulky adenopathy with rocio hepatis adenopathy  measuring up to 5.0 x 6.5 cm and a mass along the tail of the pancreas measuring  proximally 4.0 x 5.2 cm. Aortocaval adenopathy measures up to 3.6 x 5.0 cm. Numerous pelvic adenopathy measures up to 3.3 x 5.2 cm in the left external  iliac chain and 2.7 x 2.0 cm in the right internal iliac chain. REPRODUCTIVE ORGANS: Uterus and ovaries are surgically absent. URINARY BLADDER: No mass or calculus. BONES: Stable degenerative spine change with endplate sclerosis and  benign-appearing lucencies within the L2 and L4 vertebral bodies, likely  reflecting hemangioma.   ADDITIONAL COMMENTS: There is skin thickening in the anterior lower abdominal  wall bilaterally and expected appearance to abdominal postsurgical incisional  change in the midline.     IMPRESSION:  1. Left renal hilar fullness is likely due to left renal pelvis distention on  prior exam which has since resolved. 2. Extensive intra-abdominal and nkechi metastatic disease appears grossly stable  as described above. 3. Intra-abdominal wall skin thickening may reflect cellulitis. Correlate with  clinical exam.      11/14/2017:  The patient is seen today following her consultation with Destini Garrett. No definitive Rx. Patient reports they were impressed with her functional status and had no objections to her continuing her holistic medications as the alternative to cytotoxic/targeting therapy. The patient remains essentially asymptomatic. Active, no restrictions. Normal bowel and bladder function. No N/V. Good appetite. Negative cardiopulmonary review. Component      Latest Ref Rng & Units 8/28/2017 8/8/2017 7/28/2017          12:25 PM 10:44 AM 10:01 AM   Cancer Ag (CA) 125      0.0 - 38.1 U/mL 183.8 (H)  157.4 (H)   CA-125      0 - 30 U/mL  160 (H)      Results for Brice Meigs (MRN [id number]) as of 9/26/2017 15:28   Ref. Range 7/19/2017 05:15 7/28/2017 10:01 8/8/2017 10:44 8/28/2017 12:25   Creatinine Latest Ref Range: 0.57 - 1.00 mg/dL 1.39 (H) 1.25 (H) 1.03 (H) 1.16 (H)   BUN/Creatinine ratio Latest Ref Range: 9 - 23  21 (H) 13 17 22         Current Outpatient Prescriptions   Medication Sig    valsartan (DIOVAN) 160 mg tablet TAKE 1 TAB BY MOUTH DAILY FOR 90 DAYS. INDICATIONS: HYPERTENSION    omega-3 acid ethyl esters (LOVAZA) 1 gram capsule TAKE ONE CAPSULE BY MOUTH TWICE A DAY    levothyroxine (SYNTHROID) 150 mcg tablet TAKE 1 TAB BY MOUTH DAILY (BEFORE BREAKFAST). --[facility] [phone] FOR APPOINTMENT FOR MORE REFILLS    insulin detemir (LEVEMIR FLEXTOUCH) 100 unit/mL (3 mL) inpn INJECT 20 UNITS BEFORE EACH MEAL + 4 UNITS FOR EVERY 50 MG/DL ABOVE 150 MG/DL.   UNITS PER DAY    HYDROcodone-acetaminophen (NORCO) 5-325 mg per tablet Take 1 Tab by mouth every four (4) hours as needed for Pain. Max Daily Amount: 6 Tabs.  potassium chloride (KLOR-CON M10) 10 mEq tablet Take 1 Tab by mouth two (2) times a day.  albuterol (PROVENTIL VENTOLIN) 2.5 mg /3 mL (0.083 %) nebulizer solution USE 1 VAIL VIA NEBULIZER EVERY 4 HOURS AS NEEDED FOR WHEEZING    aspirin delayed-release 81 mg tablet Take  by mouth daily.  BD INSULIN PEN NEEDLE UF SHORT 31 gauge x 5/16\" ndle USE WITH INSULIN TO INJECT FIVE TIMES DAILY.  clonazePAM (KLONOPIN) 0.5 mg tablet Take 0.5 mg by mouth nightly as needed.  OTHER,NON-FORMULARY, 2 Tabs daily (after dinner). [facility] (vitamin)    insulin aspart (NOVOLOG FLEXPEN) 100 unit/mL inpn INJECT 20 UNITS BEFORE EACH MEAL + 4 UNITS FOR EVERY 50 MG/DL ABOVE 150 MG/DL.  UNITS PER DAY    SYMBICORT 160-4.5 mcg/actuation HFA inhaler 2 Puffs two (2) times daily as needed.  omega-3 acid ethyl esters (LOVAZA) 1 gram capsule TAKE ONE CAPSULE BY MOUTH TWICE A DAY    Liraglutide (VICTOZA) 0.6 mg/0.1 mL (18 mg/3 mL) sub-q pen 0.3 mL by SubCUTAneous route daily (with lunch).  cycloSPORINE (RESTASIS) 0.05 % ophthalmic emulsion 1 Drop two (2) times a day.  sertraline (ZOLOFT) 50 mg tablet TAKE 1 TABLET BY MOUTH EVERY MORNING    fluticasone-salmeterol (ADVAIR HFA) 230-21 mcg/actuation inhaler Take 2 Puffs by inhalation two (2) times a day.  cholecalciferol, VITAMIN D3, (VITAMIN D3) 5,000 unit tab tablet Take 5,000 Units by mouth daily.  furosemide (LASIX) 40 mg tablet Take 80 mg by mouth as needed.  albuterol (PROVENTIL HFA, VENTOLIN HFA, PROAIR HFA) 90 mcg/actuation inhaler Take 2 Puffs by inhalation every four (4) hours as needed for Wheezing.  diphenhydrAMINE (BENADRYL) 25 mg capsule     ondansetron (ZOFRAN ODT) 8 mg disintegrating tablet Take 1 Tab by mouth every eight (8) hours as needed for Nausea.     fluticasone (FLONASE) 50 mcg/actuation nasal spray     nystatin (MYCOSTATIN) powder Apply  to affected area four (4) times daily as needed.  montelukast (SINGULAIR) 10 mg tablet Take 1 Tab by mouth daily.  fexofenadine (ALLEGRA) 180 mg tablet Take 180 mg by mouth daily. No current facility-administered medications for this visit. Review of Systems:  General: She is in good spirits and her wt is stable. Fatigue persist, but she says it is stable and not getting worse. HEENT: Denies visual changes, dysphagia or headache  Resp: Denies SOB, or change in ECKERT, wheezing or cough  CV: Denies CP, palpitations. GI/: Denies N/V, diarrhea, constipation or dysuria   MuskSkel:Continues with DJD knee pain. Says it has not been \" as bad lately\". She said she was told she needed a TKR, but has not gotten it yet. Continues to take tylenol for pain when needed and rest leg  Neuro: Denies dizzyness or syncope    Objective:     Patient Vitals for the past 8 hrs:   BP Temp Pulse Resp Height Weight   09/06/17 1424 175/86 - 72 - - -   09/06/17 1107 146/84 97.5 °F (36.4 °C) 96 16 5' 8\" (1.727 m) 311 lb 9.6 oz (141.3 kg)       Physical Exam: Prior office examination. General: A&O X3 in NAD with pleasant affect. HEENT: Sclera anicteric, pupils equal and reactive to light. Mucosa pink, slightly dry again today  Neck: No JVD. No cervical adenopathy appreciated. Heart: Regular with persistent 2-3/6 murmur  Lungs: CTA Bilat without wheezing or rales. Decreased to bases due to effort, improves with encouragement of taking deep breath   Abd: Soft, obese, NT/ND with + BS throughout. No CVA tenderness noted  Ext: Without edema and + pedal pulses bilat  Neuro: grossly intact    No results found for this or any previous visit (from the past 12 hour(s)).       Assessment:     Patient Active Problem List   Diagnosis Code    Histor of ovarian cancer Z85.43    Carcinomatosis ([facility] C80.0    Controlled type 2 diabetes mellitus without complication, with long-term current use of insulin (HCC) E11.9, Z79.4    Morbid obesity ([facility] E66.01    Abnormal mammogram R92.8    Sleep apnea G47.30    Dyslipidemia (high LDL; low HDL) E78.4    Benign essential hypertension I10    Disorder of thyroid E07.9    CINV (chemotherapy-induced nausea and vomiting) R11.2, T45.1X5A    Swelling of eye, bilateral--left > right H57.8    Portacath in place--left Z95.828    Chest pain, atypical R07.89    Hx of pulmonary embolus during pregnancy Z86.711, Z87.59    Elevated CA-125 R97.1    Ovarian cancer (HCC) C56.9    Hyperglycemia due to type 2 diabetes mellitus ([facility] E11.65    Preop cardiovascular exam Z01.810    Angina pectoris associated with type 2 diabetes mellitus (HCC) E11.59, I20.9    ARF (acute renal failure) (HCC) N17.9    Renal calculi N20.0    Elevated BUN R79.9    Pulmonary hypertension, mild I27.20    UTI (urinary tract infection) N39.0    Wheezing R06.2    Lymphadenopathy, mediastinal R59.0         Plan:     Outside consultation with [facility] reviewed. Patient desires holistic Rx at this time in view of the consultation and asymptomatic state  I have informed her that we can discuss cytotoxic treatment at anytime. Return one month or PRN  All questions answered.     Encouraged to call for any concerns or questions  Elina Jenkins MD

## 2017-11-26 RX ORDER — POTASSIUM CHLORIDE 750 MG/1
TABLET, EXTENDED RELEASE ORAL
Qty: 30 TAB | Refills: 3 | Status: SHIPPED | OUTPATIENT
Start: 2017-11-26 | End: 2017-12-26 | Stop reason: SDUPTHER

## 2017-12-04 DIAGNOSIS — J30.2 OTHER SEASONAL ALLERGIC RHINITIS: ICD-10-CM

## 2017-12-04 RX ORDER — MONTELUKAST SODIUM 10 MG/1
TABLET ORAL
Qty: 90 TAB | Refills: 1 | Status: SHIPPED | OUTPATIENT
Start: 2017-12-04 | End: 2018-05-26 | Stop reason: SDUPTHER

## 2017-12-05 ENCOUNTER — HOSPITAL ENCOUNTER (OUTPATIENT)
Dept: PREADMISSION TESTING | Age: 57
Discharge: HOME | End: 2017-12-05
Payer: MEDICARE

## 2017-12-05 ENCOUNTER — HOSPITAL ENCOUNTER (OUTPATIENT)
Dept: GENERAL RADIOLOGY | Age: 57
Discharge: HOME | End: 2017-12-05
Payer: MEDICARE

## 2017-12-05 VITALS
TEMPERATURE: 98.1 F | HEART RATE: 68 BPM | DIASTOLIC BLOOD PRESSURE: 78 MMHG | BODY MASS INDEX: 43.4 KG/M2 | WEIGHT: 293 LBS | OXYGEN SATURATION: 97 % | SYSTOLIC BLOOD PRESSURE: 123 MMHG | HEIGHT: 69 IN

## 2017-12-05 LAB
ALBUMIN SERPL-MCNC: 3 G/DL (ref 3.5–5)
ALBUMIN/GLOB SERPL: 0.6 {RATIO} (ref 1.1–2.2)
ALP SERPL-CCNC: 78 U/L (ref 45–117)
ALT SERPL-CCNC: 18 U/L (ref 12–78)
ANION GAP SERPL CALC-SCNC: 7 MMOL/L (ref 5–15)
AST SERPL-CCNC: 27 U/L (ref 15–37)
BASOPHILS # BLD: 0 K/UL (ref 0–0.1)
BASOPHILS NFR BLD: 0 % (ref 0–1)
BILIRUB SERPL-MCNC: 0.3 MG/DL (ref 0.2–1)
BUN SERPL-MCNC: 27 MG/DL (ref 6–20)
BUN/CREAT SERPL: 18 (ref 12–20)
CALCIUM SERPL-MCNC: 9 MG/DL (ref 8.5–10.1)
CHLORIDE SERPL-SCNC: 107 MMOL/L (ref 97–108)
CO2 SERPL-SCNC: 27 MMOL/L (ref 21–32)
CREAT SERPL-MCNC: 1.47 MG/DL (ref 0.55–1.02)
EOSINOPHIL # BLD: 0.2 K/UL (ref 0–0.4)
EOSINOPHIL NFR BLD: 2 % (ref 0–7)
ERYTHROCYTE [DISTWIDTH] IN BLOOD BY AUTOMATED COUNT: 13.1 % (ref 11.5–14.5)
GLOBULIN SER CALC-MCNC: 5.3 G/DL (ref 2–4)
GLUCOSE SERPL-MCNC: 113 MG/DL (ref 65–100)
HCT VFR BLD AUTO: 30.7 % (ref 35–47)
HGB BLD-MCNC: 9.5 G/DL (ref 11.5–16)
LYMPHOCYTES # BLD: 2.5 K/UL (ref 0.8–3.5)
LYMPHOCYTES NFR BLD: 28 % (ref 12–49)
MCH RBC QN AUTO: 28.4 PG (ref 26–34)
MCHC RBC AUTO-ENTMCNC: 30.9 G/DL (ref 30–36.5)
MCV RBC AUTO: 91.9 FL (ref 80–99)
MONOCYTES # BLD: 0.9 K/UL (ref 0–1)
MONOCYTES NFR BLD: 10 % (ref 5–13)
NEUTS SEG # BLD: 5.4 K/UL (ref 1.8–8)
NEUTS SEG NFR BLD: 60 % (ref 32–75)
PLATELET # BLD AUTO: 247 K/UL (ref 150–400)
POTASSIUM SERPL-SCNC: 4.7 MMOL/L (ref 3.5–5.1)
PROT SERPL-MCNC: 8.3 G/DL (ref 6.4–8.2)
RBC # BLD AUTO: 3.34 M/UL (ref 3.8–5.2)
SODIUM SERPL-SCNC: 141 MMOL/L (ref 136–145)
WBC # BLD AUTO: 9 K/UL (ref 3.6–11)

## 2017-12-05 PROCEDURE — 80053 COMPREHEN METABOLIC PANEL: CPT | Performed by: OBSTETRICS & GYNECOLOGY

## 2017-12-05 PROCEDURE — 85025 COMPLETE CBC W/AUTO DIFF WBC: CPT | Performed by: OBSTETRICS & GYNECOLOGY

## 2017-12-05 PROCEDURE — 86304 IMMUNOASSAY TUMOR CA 125: CPT | Performed by: OBSTETRICS & GYNECOLOGY

## 2017-12-05 PROCEDURE — 36415 COLL VENOUS BLD VENIPUNCTURE: CPT | Performed by: OBSTETRICS & GYNECOLOGY

## 2017-12-05 PROCEDURE — 71020 XR CHEST PA LAT: CPT

## 2017-12-06 LAB — CANCER AG125 SERPL-ACNC: 141 U/ML (ref 0–30)

## 2017-12-07 ENCOUNTER — ANESTHESIA EVENT (OUTPATIENT)
Dept: SURGERY | Age: 57
End: 2017-12-07
Payer: MEDICARE

## 2017-12-08 ENCOUNTER — HOSPITAL ENCOUNTER (OUTPATIENT)
Age: 57
Setting detail: OUTPATIENT SURGERY
Discharge: HOME | End: 2017-12-08
Attending: OBSTETRICS & GYNECOLOGY | Admitting: OBSTETRICS & GYNECOLOGY
Payer: MEDICARE

## 2017-12-08 ENCOUNTER — ANESTHESIA (OUTPATIENT)
Dept: SURGERY | Age: 57
End: 2017-12-08
Payer: MEDICARE

## 2017-12-08 VITALS
TEMPERATURE: 98.1 F | OXYGEN SATURATION: 98 % | BODY MASS INDEX: 43.4 KG/M2 | SYSTOLIC BLOOD PRESSURE: 139 MMHG | HEIGHT: 69 IN | DIASTOLIC BLOOD PRESSURE: 76 MMHG | HEART RATE: 67 BPM | WEIGHT: 293 LBS | RESPIRATION RATE: 20 BRPM

## 2017-12-08 LAB
GLUCOSE BLD STRIP.AUTO-MCNC: 114 MG/DL (ref 65–100)
GLUCOSE BLD STRIP.AUTO-MCNC: 90 MG/DL (ref 65–100)
SERVICE CMNT-IMP: ABNORMAL
SERVICE CMNT-IMP: NORMAL

## 2017-12-08 PROCEDURE — 77030031139 HC SUT VCRL2 J&J -A: Performed by: OBSTETRICS & GYNECOLOGY

## 2017-12-08 PROCEDURE — 76210000021 HC REC RM PH II 0.5 TO 1 HR: Performed by: OBSTETRICS & GYNECOLOGY

## 2017-12-08 PROCEDURE — 77030010507 HC ADH SKN DERMBND J&J -B: Performed by: OBSTETRICS & GYNECOLOGY

## 2017-12-08 PROCEDURE — 77030002933 HC SUT MCRYL J&J -A: Performed by: OBSTETRICS & GYNECOLOGY

## 2017-12-08 PROCEDURE — 76210000000 HC OR PH I REC 2 TO 2.5 HR: Performed by: OBSTETRICS & GYNECOLOGY

## 2017-12-08 PROCEDURE — 74011250636 HC RX REV CODE- 250/636: Performed by: ANESTHESIOLOGY

## 2017-12-08 PROCEDURE — 74011250636 HC RX REV CODE- 250/636

## 2017-12-08 PROCEDURE — 82962 GLUCOSE BLOOD TEST: CPT

## 2017-12-08 PROCEDURE — 76010000138 HC OR TIME 0.5 TO 1 HR: Performed by: OBSTETRICS & GYNECOLOGY

## 2017-12-08 PROCEDURE — 74011000250 HC RX REV CODE- 250: Performed by: OBSTETRICS & GYNECOLOGY

## 2017-12-08 PROCEDURE — 74011000250 HC RX REV CODE- 250: Performed by: ANESTHESIOLOGY

## 2017-12-08 PROCEDURE — 76060000032 HC ANESTHESIA 0.5 TO 1 HR: Performed by: OBSTETRICS & GYNECOLOGY

## 2017-12-08 PROCEDURE — 74011250636 HC RX REV CODE- 250/636: Performed by: OBSTETRICS & GYNECOLOGY

## 2017-12-08 PROCEDURE — 77030011640 HC PAD GRND REM COVD -A: Performed by: OBSTETRICS & GYNECOLOGY

## 2017-12-08 RX ORDER — FENTANYL CITRATE 50 UG/ML
25 INJECTION, SOLUTION INTRAMUSCULAR; INTRAVENOUS
Status: DISCONTINUED | OUTPATIENT
Start: 2017-12-08 | End: 2017-12-08 | Stop reason: HOSPADM

## 2017-12-08 RX ORDER — MIDAZOLAM HYDROCHLORIDE 1 MG/ML
0.5 INJECTION, SOLUTION INTRAMUSCULAR; INTRAVENOUS
Status: DISCONTINUED | OUTPATIENT
Start: 2017-12-08 | End: 2017-12-08 | Stop reason: HOSPADM

## 2017-12-08 RX ORDER — ONDANSETRON 2 MG/ML
INJECTION INTRAMUSCULAR; INTRAVENOUS
Status: COMPLETED
Start: 2017-12-08 | End: 2017-12-08

## 2017-12-08 RX ORDER — LIDOCAINE HYDROCHLORIDE 10 MG/ML
0.1 INJECTION, SOLUTION EPIDURAL; INFILTRATION; INTRACAUDAL; PERINEURAL AS NEEDED
Status: DISCONTINUED | OUTPATIENT
Start: 2017-12-08 | End: 2017-12-08 | Stop reason: HOSPADM

## 2017-12-08 RX ORDER — SODIUM CHLORIDE, SODIUM LACTATE, POTASSIUM CHLORIDE, CALCIUM CHLORIDE 600; 310; 30; 20 MG/100ML; MG/100ML; MG/100ML; MG/100ML
100 INJECTION, SOLUTION INTRAVENOUS CONTINUOUS
Status: DISCONTINUED | OUTPATIENT
Start: 2017-12-08 | End: 2017-12-08 | Stop reason: HOSPADM

## 2017-12-08 RX ORDER — MORPHINE SULFATE 10 MG/ML
2 INJECTION, SOLUTION INTRAMUSCULAR; INTRAVENOUS
Status: DISCONTINUED | OUTPATIENT
Start: 2017-12-08 | End: 2017-12-08 | Stop reason: HOSPADM

## 2017-12-08 RX ORDER — FENTANYL CITRATE 50 UG/ML
50 INJECTION, SOLUTION INTRAMUSCULAR; INTRAVENOUS AS NEEDED
Status: DISCONTINUED | OUTPATIENT
Start: 2017-12-08 | End: 2017-12-08 | Stop reason: HOSPADM

## 2017-12-08 RX ORDER — SODIUM CHLORIDE 0.9 % (FLUSH) 0.9 %
5-10 SYRINGE (ML) INJECTION AS NEEDED
Status: DISCONTINUED | OUTPATIENT
Start: 2017-12-08 | End: 2017-12-08 | Stop reason: HOSPADM

## 2017-12-08 RX ORDER — MIDAZOLAM HYDROCHLORIDE 1 MG/ML
1 INJECTION, SOLUTION INTRAMUSCULAR; INTRAVENOUS AS NEEDED
Status: DISCONTINUED | OUTPATIENT
Start: 2017-12-08 | End: 2017-12-08 | Stop reason: HOSPADM

## 2017-12-08 RX ORDER — ONDANSETRON 2 MG/ML
4 INJECTION INTRAMUSCULAR; INTRAVENOUS ONCE
Status: COMPLETED | OUTPATIENT
Start: 2017-12-08 | End: 2017-12-08

## 2017-12-08 RX ORDER — ROPIVACAINE HYDROCHLORIDE 5 MG/ML
150 INJECTION, SOLUTION EPIDURAL; INFILTRATION; PERINEURAL AS NEEDED
Status: DISCONTINUED | OUTPATIENT
Start: 2017-12-08 | End: 2017-12-08 | Stop reason: HOSPADM

## 2017-12-08 RX ORDER — DIPHENHYDRAMINE HYDROCHLORIDE 50 MG/ML
12.5 INJECTION, SOLUTION INTRAMUSCULAR; INTRAVENOUS AS NEEDED
Status: DISCONTINUED | OUTPATIENT
Start: 2017-12-08 | End: 2017-12-08 | Stop reason: HOSPADM

## 2017-12-08 RX ORDER — HYDROCODONE BITARTRATE AND ACETAMINOPHEN 5; 325 MG/1; MG/1
1 TABLET ORAL
Qty: 12 TAB | Refills: 0 | Status: ON HOLD | OUTPATIENT
Start: 2017-12-08 | End: 2018-04-11

## 2017-12-08 RX ORDER — PROPOFOL 10 MG/ML
INJECTION, EMULSION INTRAVENOUS AS NEEDED
Status: DISCONTINUED | OUTPATIENT
Start: 2017-12-08 | End: 2017-12-08 | Stop reason: HOSPADM

## 2017-12-08 RX ORDER — SODIUM CHLORIDE 0.9 % (FLUSH) 0.9 %
5-10 SYRINGE (ML) INJECTION EVERY 8 HOURS
Status: DISCONTINUED | OUTPATIENT
Start: 2017-12-08 | End: 2017-12-08 | Stop reason: HOSPADM

## 2017-12-08 RX ORDER — MIDAZOLAM HYDROCHLORIDE 1 MG/ML
INJECTION, SOLUTION INTRAMUSCULAR; INTRAVENOUS AS NEEDED
Status: DISCONTINUED | OUTPATIENT
Start: 2017-12-08 | End: 2017-12-08 | Stop reason: HOSPADM

## 2017-12-08 RX ORDER — PROPOFOL 10 MG/ML
INJECTION, EMULSION INTRAVENOUS
Status: DISCONTINUED | OUTPATIENT
Start: 2017-12-08 | End: 2017-12-08 | Stop reason: HOSPADM

## 2017-12-08 RX ORDER — LIDOCAINE HYDROCHLORIDE 10 MG/ML
INJECTION INFILTRATION; PERINEURAL AS NEEDED
Status: DISCONTINUED | OUTPATIENT
Start: 2017-12-08 | End: 2017-12-08 | Stop reason: HOSPADM

## 2017-12-08 RX ADMIN — CEFAZOLIN 3 G: 1 INJECTION, POWDER, FOR SOLUTION INTRAMUSCULAR; INTRAVENOUS; PARENTERAL at 10:56

## 2017-12-08 RX ADMIN — SODIUM CHLORIDE, SODIUM LACTATE, POTASSIUM CHLORIDE, AND CALCIUM CHLORIDE 100 ML/HR: 600; 310; 30; 20 INJECTION, SOLUTION INTRAVENOUS at 09:55

## 2017-12-08 RX ADMIN — ONDANSETRON 4 MG: 2 INJECTION INTRAMUSCULAR; INTRAVENOUS at 12:35

## 2017-12-08 RX ADMIN — PROPOFOL 25 MG: 10 INJECTION, EMULSION INTRAVENOUS at 11:13

## 2017-12-08 RX ADMIN — LIDOCAINE HYDROCHLORIDE 0.1 ML: 10 INJECTION, SOLUTION EPIDURAL; INFILTRATION; INTRACAUDAL; PERINEURAL at 09:55

## 2017-12-08 RX ADMIN — MIDAZOLAM HYDROCHLORIDE 3 MG: 1 INJECTION, SOLUTION INTRAMUSCULAR; INTRAVENOUS at 10:52

## 2017-12-08 RX ADMIN — PROPOFOL 50 MCG/KG/MIN: 10 INJECTION, EMULSION INTRAVENOUS at 10:56

## 2017-12-08 RX ADMIN — MIDAZOLAM HYDROCHLORIDE 2 MG: 1 INJECTION, SOLUTION INTRAMUSCULAR; INTRAVENOUS at 10:50

## 2017-12-08 RX ADMIN — PROPOFOL 25 MG: 10 INJECTION, EMULSION INTRAVENOUS at 11:07

## 2017-12-08 RX ADMIN — PROPOFOL 25 MG: 10 INJECTION, EMULSION INTRAVENOUS at 11:11

## 2017-12-08 RX ADMIN — PROPOFOL 35 MG: 10 INJECTION, EMULSION INTRAVENOUS at 11:08

## 2017-12-08 RX ADMIN — PROPOFOL 25 MG: 10 INJECTION, EMULSION INTRAVENOUS at 11:09

## 2017-12-08 NOTE — DISCHARGE INSTRUCTIONS
[address] [facility]necologic [facility]  MD Anya Huang MD Irvine Elder, JAD    Patient Discharge Instructions    Harvey Moise / [id number] : 1960    Admitted 2017 Discharged: 2017     Take Home Medications     No current facility-administered medications on file prior to encounter. Current Outpatient Prescriptions on File Prior to Encounter   Medication Sig Dispense Refill    insulin detemir (LEVEMIR FLEXTOUCH) 100 unit/mL (3 mL) inpn INJECT 30 UNITS IN AM AND 50 UNITS AT NIGHT. INCREASE AS DIRECTED TO  UNITS PER DAY--CALL [phone] FOR APPOINTMENT FOR MORE REFILLS 30 mL 3    KLOR-CON M10 10 mEq tablet TAKE 1 TAB BY MOUTH TWO (2) DAYS A WEEK. (Patient taking differently: TAKE 1 TAB BY MOUTH TWO (2) DAYS A WEEK. WED AND FRI) 30 Tab 3    valsartan (DIOVAN) 160 mg tablet TAKE 1 TAB BY MOUTH DAILY FOR 90 DAYS. INDICATIONS: HYPERTENSION 90 Tab 0    omega-3 acid ethyl esters (LOVAZA) 1 gram capsule TAKE ONE CAPSULE BY MOUTH TWICE A  Cap 2    levothyroxine (SYNTHROID) 150 mcg tablet TAKE 1 TAB BY MOUTH DAILY (BEFORE BREAKFAST). --[facility] [phone] FOR APPOINTMENT FOR MORE REFILLS 90 Tab 0    diphenhydrAMINE (BENADRYL) 25 mg capsule Take 25 mg by mouth as needed.  aspirin delayed-release 81 mg tablet Take  by mouth daily.  BD INSULIN PEN NEEDLE UF SHORT 31 gauge x 5/16\" ndle USE WITH INSULIN TO INJECT FIVE TIMES DAILY. 200 Pen Needle 11    fluticasone (FLONASE) 50 mcg/actuation nasal spray 2 Sprays by Both Nostrils route daily.  clonazePAM (KLONOPIN) 0.5 mg tablet Take 0.5 mg by mouth nightly as needed.  nystatin (MYCOSTATIN) powder Apply  to affected area four (4) times daily as needed.  OTHER,NON-FORMULARY, 2 Tabs daily (after dinner). [facility] (vitamin)      insulin aspart (NOVOLOG FLEXPEN) 100 unit/mL inpn INJECT 20 UNITS BEFORE EACH MEAL + 4 UNITS FOR EVERY 50 MG/DL ABOVE 150 MG/DL.   UNITS PER DAY 30 mL 11    SYMBICORT 160-4.5 mcg/actuation HFA inhaler 2 Puffs two (2) times daily as needed.  Liraglutide (VICTOZA) 0.6 mg/0.1 mL (18 mg/3 mL) sub-q pen 0.3 mL by SubCUTAneous route daily (with lunch). 3 Each 11    cycloSPORINE (RESTASIS) 0.05 % ophthalmic emulsion 1 Drop two (2) times a day.  sertraline (ZOLOFT) 50 mg tablet TAKE 1 TABLET BY MOUTH EVERY MORNING  1    fluticasone-salmeterol (ADVAIR HFA) 230-21 mcg/actuation inhaler Take 2 Puffs by inhalation two (2) times a day. 12 g 5    cholecalciferol, VITAMIN D3, (VITAMIN D3) 5,000 unit tab tablet Take 5,000 Units by mouth daily.  furosemide (LASIX) 40 mg tablet Take 80 mg by mouth as needed. 3    albuterol (PROVENTIL HFA, VENTOLIN HFA, PROAIR HFA) 90 mcg/actuation inhaler Take 2 Puffs by inhalation every four (4) hours as needed for Wheezing.  fexofenadine (ALLEGRA) 180 mg tablet Take 180 mg by mouth daily.  HYDROcodone-acetaminophen (NORCO) 5-325 mg per tablet Take 1 Tab by mouth every four (4) hours as needed for Pain. Max Daily Amount: 6 Tabs. 30 Tab 0    albuterol (PROVENTIL VENTOLIN) 2.5 mg /3 mL (0.083 %) nebulizer solution USE 1 VAIL VIA NEBULIZER EVERY 4 HOURS AS NEEDED FOR WHEEZING 3 Package 1    ondansetron (ZOFRAN ODT) 8 mg disintegrating tablet Take 1 Tab by mouth every eight (8) hours as needed for Nausea. 30 Tab 3       · It is important that you take the medication exactly as they are prescribed. · Keep your medication in the bottles provided by the pharmacist and keep a list of the medication names, dosages, and times to be taken in your wallet. · Do not take other medications without consulting your doctor. What to do at Home    Recommended diet: Regular Diet, stay hydrated. You should take a stool softener such as colace for constipation. If constipation persists for >24 hours you should take a dose of Milk of Magnesia. Call if your constipation persists.     Recommended activity:   No driving for 1 week and/or while on pain medication  Walk at least 6 times per day  Showers okay, no tub bathing/swimming for two weeks  Activity as able, stairs okay. If you experience any of the following persisting symptoms:    Nausea/vomiting, fever > 101 F, diarrhea/constipation, increasing pain despite medication, increasing vaginal discharge or any concerns, please call our office. Follow-up with Dr. Yohannes Mcdowell in 2 week. Call [phone] for an appointment. Information obtained by :  I understand that if any problems occur once I am at home I am to contact my physician. I understand and acknowledge receipt of the instructions indicated above. Physician's or R.N.'s Signature                                                                  Date/Time                                                                                                                                              Patient or Representative Signature                                                          Date/Time      DISCHARGE SUMMARY from Nurse    PATIENT INSTRUCTIONS:    After general anesthesia or intravenous sedation, for 24 hours or while taking prescription Narcotics:  · Limit your activities  · Do not drive and operate hazardous machinery  · Do not make important personal or business decisions  · Do  not drink alcoholic beverages  · If you have not urinated within 8 hours after discharge, please contact your surgeon on call.     Report the following to your surgeon:  · Excessive pain, swelling, redness or odor of or around the surgical area  · Temperature over 100.5  · Nausea and vomiting lasting longer than 4 hours or if unable to take medications  · Any signs of decreased circulation or nerve impairment to extremity: change in color, persistent  numbness, tingling, coldness or increase pain  · Any questions    What to do at Home:  Recommended activity: as listed above    If you experience any of the following symptoms as listed above, please follow up with Dr Edilma Arnold. *  Please give a list of your current medications to your Primary Care Provider. *  Please update this list whenever your medications are discontinued, doses are      changed, or new medications (including over-the-counter products) are added. *  Please carry medication information at all times in case of emergency situations. These are general instructions for a healthy lifestyle:    No smoking/ No tobacco products/ Avoid exposure to second hand smoke  Surgeon General's Warning:  Quitting smoking now greatly reduces serious risk to your health. Obesity, smoking, and sedentary lifestyle greatly increases your risk for illness    A healthy diet, regular physical exercise & weight monitoring are important for maintaining a healthy lifestyle    You may be retaining fluid if you have a history of heart failure or if you experience any of the following symptoms:  Weight gain of 3 pounds or more overnight or 5 pounds in a week, increased swelling in our hands or feet or shortness of breath while lying flat in bed. Please call your doctor as soon as you notice any of these symptoms; do not wait until your next office visit. Recognize signs and symptoms of STROKE:    F-face looks uneven    A-arms unable to move or move unevenly    S-speech slurred or non-existent    T-time-call 911 as soon as signs and symptoms begin-DO NOT go       Back to bed or wait to see if you get better-TIME IS BRAIN. Warning Signs of HEART ATTACK     Call 911 if you have these symptoms:   Chest discomfort. Most heart attacks involve discomfort in the center of the chest that lasts more than a few minutes, or that goes away and comes back. It can feel like uncomfortable pressure, squeezing, fullness, or pain.  Discomfort in other areas of the upper body.  Symptoms can include pain or discomfort in one or both arms, the back, neck, jaw, or stomach.  Shortness of breath with or without chest discomfort.  Other signs may include breaking out in a cold sweat, nausea, or lightheadedness. Don't wait more than five minutes to call 911 - MINUTES MATTER! Fast action can save your life. Calling 911 is almost always the fastest way to get lifesaving treatment. Emergency Medical Services staff can begin treatment when they arrive -- up to an hour sooner than if someone gets to the hospital by car. The discharge information has been reviewed with the patient and spouse. The patient and spouse verbalized understanding. Discharge medications reviewed with the patient and spouse and appropriate educational materials and side effects teaching were provided.

## 2017-12-08 NOTE — ROUTINE PROCESS
Patient: Keshawn Weaver MRN: [id number]  SSN: [id number]   YOB: 1960  Age: 65 y.o. Sex: female     Patient is status post Procedure(s):  PORTACATH REMOVAL. Surgeon(s) and Role:     * Janneth Rosales MD - Primary    Local/Dose/Irrigation:  8 ml 1% Lidocaine was injected into patient's right chest    Saline irrigation to sterile field.                     Peripheral IV 12/08/17 Right Wrist (Active)   Dressing Status Clean, dry, & intact 12/8/2017  9:48 AM   Dressing Type Transparent 12/8/2017  9:48 AM   Hub Color/Line Status Infusing 12/8/2017  9:48 AM                           Dressing/Packing:  Wound Chest Right-DRESSING TYPE: Topical skin adhesive/glue (12/08/17 1121)  Splint/Cast:  ]    Other:  none

## 2017-12-08 NOTE — IP AVS SNAPSHOT
[address] 
[phone] Patient: Michael Arriaga MRN: [id number] :1960 About your hospitalization You were admitted on:  2017 You last received care in the:  [facility] PACU You were discharged on:  2017 Why you were hospitalized Your primary diagnosis was:  Not on File Things You Need To Do (next 8 weeks) Follow up with Estrella Sánchez MD  
  
Phone:  [phone] Where:  [address], [address] [address] Schedule an appointment with Rex Mix MD as soon as possible for a visit in 2 week(s) Phone:  [phone] Where:  [address] [place] [address] At [place], [address] [place], [place] [address], [place], [address] [address] Tuesday Dec 26, 2017 Any with Tara Hanley MD at  8:30 AM  
Where:  [facility] [address])   
1 MONTH with Rex Mix MD at  8:45 AM  
Where:  [facility] [address]) Discharge Orders None A check chikis indicates which time of day the medication should be taken. My Medications TAKE these medications as instructed Instructions Each Dose to Equal  
 Morning Noon Evening Bedtime * albuterol 90 mcg/actuation inhaler Commonly known as:  PROVENTIL HFA, VENTOLIN HFA, PROAIR HFA Your last dose was: Your next dose is: Take 2 Puffs by inhalation every four (4) hours as needed for Wheezing. 2 Puff * albuterol 2.5 mg /3 mL (0.083 %) nebulizer solution Commonly known as:  PROVENTIL VENTOLIN Your last dose was: Your next dose is:    
   
   
 USE 1 VAIL VIA NEBULIZER EVERY 4 HOURS AS NEEDED FOR WHEEZING  
     
   
   
   
  
 ALLEGRA 180 mg tablet Generic drug:  fexofenadine Your last dose was: Your next dose is: Take 180 mg by mouth daily. 180 mg  
    
   
   
   
  
 aspirin delayed-release 81 mg tablet Your last dose was: Your next dose is: Take  by mouth daily. BD INSULIN PEN NEEDLE UF SHORT 31 gauge x 5/16\" Ndle Generic drug:  Insulin Needles (Disposable) Your last dose was: Your next dose is:    
   
   
 USE WITH INSULIN TO INJECT FIVE TIMES DAILY. cholecalciferol (VITAMIN D3) 5,000 unit Tab tablet Commonly known as:  VITAMIN D3 Your last dose was: Your next dose is: Take 5,000 Units by mouth daily. 5000 Units  
    
   
   
   
  
 clonazePAM 0.5 mg tablet Commonly known as:  Glenda Linda Your last dose was: Your next dose is: Take 0.5 mg by mouth nightly as needed. 0.5 mg  
    
   
   
   
  
 diphenhydrAMINE 25 mg capsule Commonly known as:  BENADRYL Your last dose was: Your next dose is: Take 25 mg by mouth as needed. 25 mg  
    
   
   
   
  
 fluticasone 50 mcg/actuation nasal spray Commonly known as:  Domnick Means Your last dose was: Your next dose is: 2 Sprays by Both Nostrils route daily. 2 Spray  
    
   
   
   
  
 fluticasone-salmeterol 230-21 mcg/actuation inhaler Commonly known as:  ADVAIR HFA Your last dose was: Your next dose is: Take 2 Puffs by inhalation two (2) times a day. 2 Puff  
    
   
   
   
  
 furosemide 40 mg tablet Commonly known as:  LASIX Your last dose was: Your next dose is: Take 80 mg by mouth as needed. 80 mg  
    
   
   
   
  
 * HYDROcodone-acetaminophen 5-325 mg per tablet Commonly known as:  Juanetta Rasp Your last dose was: Your next dose is: Take 1 Tab by mouth every four (4) hours as needed for Pain. Max Daily Amount: 6 Tabs. 1 Tab * HYDROcodone-acetaminophen 5-325 mg per tablet Commonly known as:  Mary Fiore Your last dose was: Your next dose is: Take 1 Tab by mouth every four (4) hours as needed for Pain. Max Daily Amount: 6 Tabs. 1 Tab  
    
   
   
   
  
 insulin aspart 100 unit/mL Inpn Commonly known as:  Ross Robert Your last dose was: Your next dose is: INJECT 20 UNITS BEFORE EACH MEAL + 4 UNITS FOR EVERY 50 MG/DL ABOVE 150 MG/DL.  UNITS PER DAY  
     
   
   
   
  
 insulin detemir 100 unit/mL (3 mL) Inpn Commonly known as:  Gio Mari Your last dose was: Your next dose is: INJECT 30 UNITS IN AM AND 50 UNITS AT NIGHT. INCREASE AS DIRECTED TO  UNITS PER DAY--CALL [phone] FOR APPOINTMENT FOR MORE REFILLS  
     
   
   
   
  
 KLOR-CON M10 10 mEq tablet Generic drug:  potassium chloride Your last dose was: Your next dose is: TAKE 1 TAB BY MOUTH TWO (2) DAYS A WEEK. levothyroxine 150 mcg tablet Commonly known as:  SYNTHROID Your last dose was: Your next dose is: TAKE 1 TAB BY MOUTH DAILY (BEFORE BREAKFAST). --PMDV [phone] FOR APPOINTMENT FOR MORE REFILLS Liraglutide 0.6 mg/0.1 mL (18 mg/3 mL) Pnij Commonly known as:  Julian Ramon Your last dose was: Your next dose is: 0.3 mL by SubCUTAneous route daily (with lunch). 1.8 mg  
    
   
   
   
  
 montelukast 10 mg tablet Commonly known as:  SINGULAIR Your last dose was: Your next dose is: TAKE 1 TAB BY MOUTH DAILY. nystatin powder Commonly known as:  MYCOSTATIN Your last dose was: Your next dose is:    
   
   
 Apply  to affected area four (4) times daily as needed. omega-3 acid ethyl esters 1 gram capsule Commonly known as:  Kristen Spatz Your last dose was: Your next dose is: TAKE ONE CAPSULE BY MOUTH TWICE A DAY  
     
   
   
   
  
 ondansetron 8 mg disintegrating tablet Commonly known as:  ZOFRAN ODT Your last dose was: Your next dose is: Take 1 Tab by mouth every eight (8) hours as needed for Nausea. 8 mg OTHER(NON-FORMULARY) Your last dose was: Your next dose is:    
   
   
 2 Tabs daily (after dinner). Eye Promise (vitamin) 2 Tab RESTASIS 0.05 % ophthalmic emulsion Generic drug:  cycloSPORINE Your last dose was: Your next dose is:    
   
   
 1 Drop two (2) times a day. 1 Drop  
    
   
   
   
  
 sertraline 50 mg tablet Commonly known as:  ZOLOFT Your last dose was: Your next dose is: TAKE 1 TABLET BY MOUTH EVERY MORNING  
     
   
   
   
  
 SYMBICORT 160-4.5 mcg/actuation Hfaa Generic drug:  budesonide-formoterol Your last dose was: Your next dose is:    
   
   
 2 Puffs two (2) times daily as needed. 2 Puff  
    
   
   
   
  
 valsartan 160 mg tablet Commonly known as:  DIOVAN Your last dose was: Your next dose is: TAKE 1 TAB BY MOUTH DAILY FOR 90 DAYS. INDICATIONS: HYPERTENSION  
     
   
   
   
  
 * Notice: This list has 4 medication(s) that are the same as other medications prescribed for you. Read the directions carefully, and ask your doctor or other care provider to review them with you. Where to Get Your Medications Information on where to get these meds will be given to you by the nurse or doctor. ! Ask your nurse or doctor about these medications HYDROcodone-acetaminophen 5-325 mg per tablet Discharge Instructions [address] [place] [address] MD Janet Olivier MD Butler Bandy, NP Patient Discharge Instructions Lars Reyes / [id number] : 1960 Admitted 2017 Discharged: 2017 Take Home Medications No current facility-administered medications on file prior to encounter. Current Outpatient Prescriptions on File Prior to Encounter Medication Sig Dispense Refill  insulin detemir (LEVEMIR FLEXTOUCH) 100 unit/mL (3 mL) inpn INJECT 30 UNITS IN AM AND 50 UNITS AT NIGHT. INCREASE AS DIRECTED TO  UNITS PER DAY--CALL [phone] FOR APPOINTMENT FOR MORE REFILLS 30 mL 3  
 KLOR-CON M10 10 mEq tablet TAKE 1 TAB BY MOUTH TWO (2) DAYS A WEEK. (Patient taking differently: TAKE 1 TAB BY MOUTH TWO (2) DAYS A WEEK. WED AND FRI) 30 Tab 3  
 valsartan (DIOVAN) 160 mg tablet TAKE 1 TAB BY MOUTH DAILY FOR 90 DAYS. INDICATIONS: HYPERTENSION 90 Tab 0  
 omega-3 acid ethyl esters (LOVAZA) 1 gram capsule TAKE ONE CAPSULE BY MOUTH TWICE A  Cap 2  
 levothyroxine (SYNTHROID) 150 mcg tablet TAKE 1 TAB BY MOUTH DAILY (BEFORE BREAKFAST). --DRAG [phone] FOR APPOINTMENT FOR MORE REFILLS 90 Tab 0  
 diphenhydrAMINE (BENADRYL) 25 mg capsule Take 25 mg by mouth as needed.  aspirin delayed-release 81 mg tablet Take  by mouth daily.  BD INSULIN PEN NEEDLE UF SHORT 31 gauge x 5/16\" ndle USE WITH INSULIN TO INJECT FIVE TIMES DAILY. 200 Pen Needle 11  
 fluticasone (FLONASE) 50 mcg/actuation nasal spray 2 Sprays by Both Nostrils route daily.  clonazePAM (KLONOPIN) 0.5 mg tablet Take 0.5 mg by mouth nightly as needed.  nystatin (MYCOSTATIN) powder Apply  to affected area four (4) times daily as needed.  OTHER,NON-FORMULARY, 2 Tabs daily (after dinner). Eye Promise (vitamin)  insulin aspart (NOVOLOG FLEXPEN) 100 unit/mL inpn INJECT 20 UNITS BEFORE EACH MEAL + 4 UNITS FOR EVERY 50 MG/DL ABOVE 150 MG/DL.  UNITS PER DAY 30 mL 11  
 SYMBICORT 160-4.5 mcg/actuation HFA inhaler 2 Puffs two (2) times daily as needed.  Liraglutide (VICTOZA) 0.6 mg/0.1 mL (18 mg/3 mL) sub-q pen 0.3 mL by SubCUTAneous route daily (with lunch). 3 Each 11  
 cycloSPORINE (RESTASIS) 0.05 % ophthalmic emulsion 1 Drop two (2) times a day.  sertraline (ZOLOFT) 50 mg tablet TAKE 1 TABLET BY MOUTH EVERY MORNING  1  
 fluticasone-salmeterol (ADVAIR HFA) 230-21 mcg/actuation inhaler Take 2 Puffs by inhalation two (2) times a day. 12 g 5  cholecalciferol, VITAMIN D3, (VITAMIN D3) 5,000 unit tab tablet Take 5,000 Units by mouth daily.  furosemide (LASIX) 40 mg tablet Take 80 mg by mouth as needed. 3  
 albuterol (PROVENTIL HFA, VENTOLIN HFA, PROAIR HFA) 90 mcg/actuation inhaler Take 2 Puffs by inhalation every four (4) hours as needed for Wheezing.  fexofenadine (ALLEGRA) 180 mg tablet Take 180 mg by mouth daily.  HYDROcodone-acetaminophen (NORCO) 5-325 mg per tablet Take 1 Tab by mouth every four (4) hours as needed for Pain. Max Daily Amount: 6 Tabs. 30 Tab 0  
 albuterol (PROVENTIL VENTOLIN) 2.5 mg /3 mL (0.083 %) nebulizer solution USE 1 VAIL VIA NEBULIZER EVERY 4 HOURS AS NEEDED FOR WHEEZING 3 Package 1  
 ondansetron (ZOFRAN ODT) 8 mg disintegrating tablet Take 1 Tab by mouth every eight (8) hours as needed for Nausea. 30 Tab 3  
 
 
· It is important that you take the medication exactly as they are prescribed. · Keep your medication in the bottles provided by the pharmacist and keep a list of the medication names, dosages, and times to be taken in your wallet. · Do not take other medications without consulting your doctor. What to do at [facility] Recommended diet: Regular Diet, stay hydrated. You should take a stool softener such as colace for constipation. If constipation persists for >24 hours you should take a dose of Milk of Magnesia. Call if your constipation persists. Recommended activity: No driving for 1 week and/or while on pain medication Walk at least 6 times per day Showers okay, no tub bathing/swimming for two weeks Activity as able, stairs okay. If you experience any of the following persisting symptoms: 
 
Nausea/vomiting, fever > 101 F, diarrhea/constipation, increasing pain despite medication, increasing vaginal discharge or any concerns, please call our office. Follow-up with Dr. Jose Ruiz in 2 week. Call [phone] for an appointment. Information obtained by : 
I understand that if any problems occur once I am at home I am to contact my physician. I understand and acknowledge receipt of the instructions indicated above. Physician's or R.N.'s Signature                                                                  Date/Time Patient or Representative Signature                                                          Date/Time DISCHARGE SUMMARY from Nurse PATIENT INSTRUCTIONS: 
 
 
F-face looks uneven A-arms unable to move or move unevenly S-speech slurred or non-existent T-time-call 911 as soon as signs and symptoms begin-DO NOT go Back to bed or wait to see if you get better-TIME IS BRAIN. Warning Signs of HEART ATTACK Call 911 if you have these symptoms: 
? Chest discomfort. Most heart attacks involve discomfort in the center of the chest that lasts more than a few minutes, or that goes away and comes back. It can feel like uncomfortable pressure, squeezing, fullness, or pain. ? Discomfort in other areas of the upper body.  Symptoms can include pain or discomfort in one or both arms, the back, neck, jaw, or stomach. ? Shortness of breath with or without chest discomfort. ? Other signs may include breaking out in a cold sweat, nausea, or lightheadedness. Don't wait more than five minutes to call [address]! Fast action can save your life. Calling 911 is almost always the fastest way to get lifesaving treatment. Emergency Medical Services staff can begin treatment when they arrive  up to an hour sooner than if someone gets to the hospital by car. The discharge information has been reviewed with the patient and spouse. The patient and spouse verbalized understanding. Discharge medications reviewed with the patient and spouse and appropriate educational materials and side effects teaching were provided. Introducing [facility]! Dear Madhav Garza: Thank you for requesting a [employer] account. Our records indicate that you already have an active [employer] account. You can access your account anytime at [email or web address]. [email or web address] Did you know that you can access your hospital and ER discharge instructions at any time in [employer]? You can also review all of your test results from your hospital stay or ER visit. Additional Information If you have questions, please visit the Frequently Asked Questions section of the [employer] website at [email or web address] Remember, [employer] is NOT to be used for urgent needs. For medical emergencies, dial 911. Now available from your iPhone and Android! Providers Seen During Your Hospitalization Provider Specialty Primary office phone Tammy Garcia MD Gynecologic Oncology [phone] Your Primary Care Physician (PCP) Primary Care Physician Office Phone Office Fax Yomaira De La Cruz [phone] You are allergic to the following Allergen Reactions Iodinated Contrast- Oral And Iv Dye Rash 13 hr pre-medication prior to IV contrast  
    
 Carboplatin Other (comments) Patient developed shortness of breath, felt faint, coughing, skin flushed and \"itchy\". This occurred on the patients 8th treatment. Lipitor (Atorvastatin) Myalgia Percocet (Oxycodone-Acetaminophen) Other (comments) fever Shellfish Containing Products Hives Tape (Adhesive) Itching Other (comments) \"op site-- clear,thin tape\"--caused blister Tomato Hives Recent Documentation Height Weight BMI OB Status Smoking Status 1.753 m 140.2 kg 45.64 kg/m2 Hysterectomy Never Smoker Emergency Contacts Name Discharge Info Relation Home Work Mobile [address] [facility] Ian CAREGIVER [3] Son [22] [phone] Patient Belongings The following personal items are in your possession at time of discharge: 
  Dental Appliances: None                Clothing:  (clothing bag, glasses to pacu) Discharge Instructions Attachments/References MEFS - HYDROCODONE/ACETAMINOPHEN (VICODIN, Manisha President, LORTAB) - (BY MOUTH) (ENGLISH) Patient Handouts Hydrocodone/Acetaminophen (Vicodin, Norco, Lortab) - (By mouth) Why this medicine is used:  
Treats pain. Contact a nurse or doctor right away if you have: · Blistering, peeling, red skin rash · Fast or slow heartbeat, shallow breathing, blue lips, fingernails, or skin · Anxiety, restlessness, muscle spasms, twitching, seeing or hearing things that are not there · Dark urine or pale stools, yellow skin or eyes · Extreme weakness, sweating, seizures, cold or clammy skin · Lightheadedness, dizziness, fainting, fever, sweating Common side effects: 
· Constipation, nausea, vomiting, loss of appetite, stomach pain · Tiredness or sleepiness © 2017 [address] Information is for End User's use only and may not be sold, redistributed or otherwise used for commercial purposes. Please provide this summary of care documentation to your next provider. Signatures-by signing, you are acknowledging that this After Visit Summary has been reviewed with you and you have received a copy. Patient Signature:  ____________________________________________________________ Date:  ____________________________________________________________  
  
[facility] Provider Signature:  ____________________________________________________________ Date:  ____________________________________________________________

## 2017-12-08 NOTE — PERIOP NOTES
11:11 AM  UNABLE TO SPEAK TO PATIENT'S  (ERICK HOUSTON) TO UPDATE HIM ON PATIENT'S SURGICAL STATUS. THIS NURSE LEFT A MESSAGE WITH THE SURGICAL WAITING AREA VOLUNTEER.

## 2017-12-08 NOTE — ANESTHESIA POSTPROCEDURE EVALUATION
Post-Anesthesia Evaluation and Assessment    Patient: Denis Langford MRN: [id number]  SSN: [id number]    YOB: 1960  Age: 65 y.o. Sex: female       Cardiovascular Function/Vital Signs  Visit Vitals    /69    Pulse 61    Temp 36.7 °C (98 °F)    Resp 22    Ht 5' 9\" (1.753 m)    Wt 140.2 kg (309 lb 1.4 oz)    SpO2 95%    BMI 45.64 kg/m2       Patient is status post MAC anesthesia for Procedure(s):  PORTACATH REMOVAL. Nausea/Vomiting: None    Postoperative hydration reviewed and adequate. Pain:  Pain Scale 1: Numeric (0 - 10) (12/08/17 1230)  Pain Intensity 1: 0 (12/08/17 1230)   Managed    Neurological Status:   Neuro (WDL): Within Defined Limits (12/08/17 1230)  Neuro  LUE Motor Response: Purposeful (12/08/17 1230)  LLE Motor Response: Purposeful (12/08/17 1230)  RUE Motor Response: Purposeful (12/08/17 1230)  RLE Motor Response: Purposeful (12/08/17 1230)   At baseline    Mental Status and Level of Consciousness: Arousable    Pulmonary Status:   O2 Device: Room air (12/08/17 1230)   Adequate oxygenation and airway patent    Complications related to anesthesia: None    Post-anesthesia assessment completed.  No concerns    Signed By: Michelle Faye MD     December 8, 2017

## 2017-12-08 NOTE — BRIEF OP NOTE
BRIEF OPERATIVE NOTE    Date of Procedure: 12/8/2017   Preoperative Diagnosis: OVARIAN CANCER  Postoperative Diagnosis: Same  Procedure(s):  PORTACATH REMOVAL  Surgeon(s) and Role:     * Elina Jenkins MD - Primary         Assistant Staff:       Surgical Staff:  Circ-1: Melody Smith RN  Scrub Tech-1: Severino Muñoz  No case tracking events are documented in the log.   Anesthesia: General   Estimated Blood Loss: 5cc  Specimens: * No specimens in log *   Findings: N/N   Complications: None  Implants: * No implants in log *    Elina Jenkins MD

## 2017-12-08 NOTE — H&P
Jennifer Ocasio. Mary, MALLIKA Fletcher       2017  Jolie Arroyo   :  1960   SSN: [id number]    Diagnosis: Epithelial ovarian cancer, refractory         Subjective:   Ms. Trino Posadas is a long time pt of Dr. Leslie Nowak. She was initially dx with ovarian cancer in 2012. She had MARAH/BSO/Cytoreductive surgery on 12. Pathology:   FINAL PATHOLOGIC DIAGNOSIS  1. Omentum, omentectomy:  Serous carcinoma, high grade  2. Soft tissue, umbilicus, biopsy:  Serous carcinoma, high grade  3. Peritoneum, periappendiceal, biopsy:  Serous carcinoma, high grade  4. Colonic mesentery, biopsy:  Serous carcinoma, high grade  See comment  5. Uterus (157 grams), ovaries and fallopian tubes, supracervical hysterectomy and bilateral salpingooophorectomy:  Cervix: No diagnostic pathologic abnormality  Myometrium: No diagnostic pathologic abnormality  Endometrium: Benign polyp and cystic atrophy  Ovaries and fallopian tubes: Serous carcinoma, high grade  Serosa: Serous carcinoma implants, high-grade  See synoptic report  Synoptic Report:  Specimen: Uterus, ovaries and fallopian tubes, omentum  Procedure: Supracervical hysterectomy, bilateral salpingo-oophorectomy, omentectomy  Lymph node sampling: No lymph nodes present  Specimen integrity:  Right ovary: Intact     At that time she was Stage III C. She recovered and began 6 cycles of Carbo/Taxol. Had good response, but reoccurred and in       She received 3 cycles of Carbo/Taxol(retreatment) before having a reaction to Carbo and this was stopped. She continued her next 3 cycles with Doxil/Avastin. 3/2015-2015. Tolerated poorly with progressive disease documented (CT)  She had a treatment break from 3/2015 until she started with Topatecan for 11 cycles from 2015 through 3/2016  .    She was followed by Dr. Leslie Nowak and in 2017, she again demonstrated progressive, yet asymptomatic disease via CT scan     She began treatment with Gemzar q21 days on Feb 2nd, six cycles completed 9/6/2017     EOD CT: 9/28/2017  FINDINGS:   LUNG BASES: Dependent atelectasis. Otherwise clear. INCIDENTALLY IMAGED HEART AND MEDIASTINUM: Unremarkable. LIVER: No mass or biliary dilatation. GALLBLADDER: Unremarkable. SPLEEN: No mass. PANCREAS: No mass or ductal dilatation. ADRENALS: Unremarkable. KIDNEYS: Nonobstructing stones measuring 2 to 3 mm in both kidneys with at least  7 right and 3 left stones. Left renal hilar fullness seen on prior unenhanced CT  is no longer evident and I suspect this was due to mild hydronephrosis and renal  pelvis distention. STOMACH: Unremarkable. SMALL BOWEL: No dilatation or wall thickening. COLON: No dilatation or wall thickening. APPENDIX: Unremarkable. PERITONEUM: Multiple peritoneal implants are redemonstrated with a 16 x 22 mm  lesion anterior to the level of the liver, multiple left omental lesions more  inferiorly measuring up to 2 x 2.4 cm, and multiple implants along the inferior  margin of the right lower liver measuring up to 3.4 x 3.6 cm. Additionally there  is a implant in the right paracolic gutter measuring 2.1 x 2.2 cm. Intrapelvic  masses measure up to 3.5 x 4.0 cm and the rectovaginal space. RETROPERITONEUM: Extensive bulky adenopathy with rocio hepatis adenopathy  measuring up to 5.0 x 6.5 cm and a mass along the tail of the pancreas measuring  proximally 4.0 x 5.2 cm. Aortocaval adenopathy measures up to 3.6 x 5.0 cm. Numerous pelvic adenopathy measures up to 3.3 x 5.2 cm in the left external  iliac chain and 2.7 x 2.0 cm in the right internal iliac chain. REPRODUCTIVE ORGANS: Uterus and ovaries are surgically absent. URINARY BLADDER: No mass or calculus. BONES: Stable degenerative spine change with endplate sclerosis and  benign-appearing lucencies within the L2 and L4 vertebral bodies, likely  reflecting hemangioma.   ADDITIONAL COMMENTS: There is skin thickening in the anterior lower abdominal  wall bilaterally and expected appearance to abdominal postsurgical incisional  change in the midline.      IMPRESSION:  1. Left renal hilar fullness is likely due to left renal pelvis distention on  prior exam which has since resolved. 2. Extensive intra-abdominal and nkechi metastatic disease appears grossly stable  as described above. 3. Intra-abdominal wall skin thickening may reflect cellulitis. Correlate with  clinical exam.      11/14/2017:  The patient was seen following her consultation with Destini Garrett. No definitive Rx. Patient reports they were impressed with her functional status and had no objections to her continuing her holistic medications as the alternative to cytotoxic/targeting therapy.     The patient remains essentially asymptomatic. Active, no restrictions. Normal bowel and bladder function. No N/V. Good appetite. Negative cardiopulmonary review.     Component      Latest Ref Rng & Units 8/28/2017 8/8/2017 7/28/2017      12:25 PM 10:44 AM 10:01 AM   Cancer Ag (CA) 125      0.0 - 38.1 U/mL 183.8 (H)   157.4 (H)   CA-125      0 - 30 U/mL   160 (H)        Results for Blanca Holman (MRN [id number]) as of 9/26/2017 15:28    Ref. Range 7/19/2017 05:15 7/28/2017 10:01 8/8/2017 10:44 8/28/2017 12:25   Creatinine Latest Ref Range: 0.57 - 1.00 mg/dL 1.39 (H) 1.25 (H) 1.03 (H) 1.16 (H)   BUN/Creatinine ratio Latest Ref Range: 9 - 23  21 (H) 13 17 22                 Current Outpatient Prescriptions   Medication Sig    valsartan (DIOVAN) 160 mg tablet TAKE 1 TAB BY MOUTH DAILY FOR 90 DAYS. INDICATIONS: HYPERTENSION    omega-3 acid ethyl esters (LOVAZA) 1 gram capsule TAKE ONE CAPSULE BY MOUTH TWICE A DAY    levothyroxine (SYNTHROID) 150 mcg tablet TAKE 1 TAB BY MOUTH DAILY (BEFORE BREAKFAST). --PPSB [phone] FOR APPOINTMENT FOR MORE REFILLS    insulin detemir (LEVEMIR FLEXTOUCH) 100 unit/mL (3 mL) inpn INJECT 20 UNITS BEFORE EACH MEAL + 4 UNITS FOR EVERY 50 MG/DL ABOVE 150 MG/DL.  UNITS PER DAY    HYDROcodone-acetaminophen (NORCO) 5-325 mg per tablet Take 1 Tab by mouth every four (4) hours as needed for Pain. Max Daily Amount: 6 Tabs.  potassium chloride (KLOR-CON M10) 10 mEq tablet Take 1 Tab by mouth two (2) times a day.  albuterol (PROVENTIL VENTOLIN) 2.5 mg /3 mL (0.083 %) nebulizer solution USE 1 VAIL VIA NEBULIZER EVERY 4 HOURS AS NEEDED FOR WHEEZING    aspirin delayed-release 81 mg tablet Take  by mouth daily.  BD INSULIN PEN NEEDLE UF SHORT 31 gauge x 5/16\" ndle USE WITH INSULIN TO INJECT FIVE TIMES DAILY.  clonazePAM (KLONOPIN) 0.5 mg tablet Take 0.5 mg by mouth nightly as needed.  OTHER,NON-FORMULARY, 2 Tabs daily (after dinner). [facility] (vitamin)    insulin aspart (NOVOLOG FLEXPEN) 100 unit/mL inpn INJECT 20 UNITS BEFORE EACH MEAL + 4 UNITS FOR EVERY 50 MG/DL ABOVE 150 MG/DL.  UNITS PER DAY    SYMBICORT 160-4.5 mcg/actuation HFA inhaler 2 Puffs two (2) times daily as needed.  omega-3 acid ethyl esters (LOVAZA) 1 gram capsule TAKE ONE CAPSULE BY MOUTH TWICE A DAY    Liraglutide (VICTOZA) 0.6 mg/0.1 mL (18 mg/3 mL) sub-q pen 0.3 mL by SubCUTAneous route daily (with lunch).  cycloSPORINE (RESTASIS) 0.05 % ophthalmic emulsion 1 Drop two (2) times a day.  sertraline (ZOLOFT) 50 mg tablet TAKE 1 TABLET BY MOUTH EVERY MORNING    fluticasone-salmeterol (ADVAIR HFA) 230-21 mcg/actuation inhaler Take 2 Puffs by inhalation two (2) times a day.  cholecalciferol, VITAMIN D3, (VITAMIN D3) 5,000 unit tab tablet Take 5,000 Units by mouth daily.  furosemide (LASIX) 40 mg tablet Take 80 mg by mouth as needed.  albuterol (PROVENTIL HFA, VENTOLIN HFA, PROAIR HFA) 90 mcg/actuation inhaler Take 2 Puffs by inhalation every four (4) hours as needed for Wheezing.     diphenhydrAMINE (BENADRYL) 25 mg capsule      ondansetron (ZOFRAN ODT) 8 mg disintegrating tablet Take 1 Tab by mouth every eight (8) hours as needed for Nausea.  fluticasone (FLONASE) 50 mcg/actuation nasal spray      nystatin (MYCOSTATIN) powder Apply  to affected area four (4) times daily as needed.  montelukast (SINGULAIR) 10 mg tablet Take 1 Tab by mouth daily.  fexofenadine (ALLEGRA) 180 mg tablet Take 180 mg by mouth daily.        No current facility-administered medications for this visit.          Review of Systems:  General: She is in good spirits and her wt is stable. Fatigue persist, but she says it is stable and not getting worse. HEENT: Denies visual changes, dysphagia or headache  Resp: Denies SOB, or change in ECKERT, wheezing or cough  CV: Denies CP, palpitations. GI/: Denies N/V, diarrhea, constipation or dysuria   Musk[place]el:Continues with DJD knee pain. Says it has not been \" as bad lately\". She said she was told she needed a TKR, but has not gotten it yet. Continues to take tylenol for pain when needed and rest leg  Neuro: Denies dizzyness or syncope     Objective:      Patient Vitals for the past 8 hrs:    BP Temp Pulse Resp Height Weight   09/06/17 1424 175/86 - 72 - - -   09/06/17 1107 146/84 97.5 °F (36.4 °C) 96 16 5' 8\" (1.727 m) 311 lb 9.6 oz (141.3 kg)         Physical Exam: Prior office examination.     General: A&O X3 in NAD with pleasant affect. HEENT: Sclera anicteric, pupils equal and reactive to light. Mucosa pink, slightly dry again today  Neck: No JVD. No cervical adenopathy appreciated. Heart: Regular with persistent 2-3/6 murmur  Lungs: CTA Bilat without wheezing or rales. Decreased to bases due to effort, improves with encouragement of taking deep breath   Abd: Soft, obese, NT/ND with + BS throughout. No CVA tenderness noted  Ext: Without edema and + pedal pulses bilat  Neuro: grossly intact      Recent Results    No results found for this or any previous visit (from the past 12 hour(s)).            Assessment:           Patient Active Problem List   Diagnosis Code    Histor of ovarian cancer Z85.43    Carcinomatosis ([facility] C80.0    Controlled type 2 diabetes mellitus without complication, with long-term current use of insulin (HCC) E11.9, Z79.4    Morbid obesity (HCC) E66.01    Abnormal mammogram R92.8    Sleep apnea G47.30    Dyslipidemia (high LDL; low HDL) E78.4    Benign essential hypertension I10    Disorder of thyroid E07.9    CINV (chemotherapy-induced nausea and vomiting) R11.2, T45.1X5A    Swelling of eye, bilateral--left > right H57.8    Portacath in place--left Z95.828    Chest pain, atypical R07.89    Hx of pulmonary embolus during pregnancy Z86.711, Z87.59    Elevated CA-125 R97.1    Ovarian cancer (HCC) C56.9    Hyperglycemia due to type 2 diabetes mellitus ([facility] [address] E11.65    Preop cardiovascular exam Z01.810    Angina pectoris associated with type 2 diabetes mellitus (HCC) E11.59, I20.9    ARF (acute renal failure) (HCC) N17.9    Renal calculi N20.0    Elevated BUN R79.9    Pulmonary hypertension, mild I27.20    UTI (urinary tract infection) N39.0    Wheezing R06.2    Lymphadenopathy, mediastinal R59.0            Plan:      Outside consultation with [facility] reviewed. Patient desires holistic Rx at this time in view of the consultation and asymptomatic state  I have informed her that we can discuss cytotoxic treatment at anytime.     The patient is admitted at this time for removal of IV Port/Catheter at her request  Risks understood       John Langford MD          Plan:      Outside consultation with [facility] reviewed.   Patient desires holistic Rx at this time in view of the consultation and asymptomatic state  I have informed her that we can discuss cytotoxic treatment at anytime.     Return one month or PRN  All questions answered.     Encouraged to call for any concerns or questions  John Langford MD

## 2017-12-09 NOTE — OP NOTES
[address]   [address], [address]   OP NOTE       Name:  Roro Daniels   MR#:  [id number]   :  1960   Account #:  [de-identified]    Surgery Date:  2017   Date of Adm:  2017       PREOPERATIVE DIAGNOSIS: Ovarian cancer. POSTOPERATIVE DIAGNOSIS: Ovarian cancer. PROCEDURES PERFORMED: Removal of intravenous Port-A-Cath. ESTIMATED BLOOD LOSS: 2cc    SPECIMENS REMOVED: None. ANESTHESIA:  MAC    FINDINGS: Not applicable. DESCRIPTION OF PROCEDURE: The patient was brought to the   operating room after informed consent was signed. The patient was   placed in supine position, prepped and draped in standard fashion for   a subclavian vein catheterization. After appropriate time-out, procedure was performed. A linear incision   was made through the left subclavicular insertion site of her Port-A-  Cath. With cautery and sharp dissection, the port was now isolated and   removed en bloc. The catheter canal orifice was closed with a figure-  of-eight suture of 2-0 Vicryl. Hemostasis was observed. The skin   incision was now reapproximated with subcuticular closure of 3-0   Monocryl. Dermabond was applied. Sponge and instrument counts correct. The patient was transferred to   the recovery room in stable condition.         MD DANIEL Choi / DAREN   D:  2017   11:27   T:  2017   17:58   Job #:  [id number]

## 2017-12-26 ENCOUNTER — OFFICE VISIT (OUTPATIENT)
Dept: INTERNAL MEDICINE CLINIC | Age: 57
End: 2017-12-26

## 2017-12-26 VITALS
HEIGHT: 69 IN | WEIGHT: 293 LBS | TEMPERATURE: 97.4 F | SYSTOLIC BLOOD PRESSURE: 155 MMHG | DIASTOLIC BLOOD PRESSURE: 84 MMHG | HEART RATE: 69 BPM | RESPIRATION RATE: 20 BRPM | BODY MASS INDEX: 43.4 KG/M2 | OXYGEN SATURATION: 96 %

## 2017-12-26 DIAGNOSIS — R60.9 EDEMA, UNSPECIFIED TYPE: Primary | ICD-10-CM

## 2017-12-26 DIAGNOSIS — E03.9 ACQUIRED HYPOTHYROIDISM: ICD-10-CM

## 2017-12-26 DIAGNOSIS — I10 ESSENTIAL HYPERTENSION: ICD-10-CM

## 2017-12-26 DIAGNOSIS — R60.9 EDEMA, UNSPECIFIED TYPE: ICD-10-CM

## 2017-12-26 DIAGNOSIS — E11.65 TYPE 2 DIABETES MELLITUS WITH HYPERGLYCEMIA, WITH LONG-TERM CURRENT USE OF INSULIN (HCC): ICD-10-CM

## 2017-12-26 DIAGNOSIS — R79.89 ELEVATED SERUM CREATININE: Primary | ICD-10-CM

## 2017-12-26 DIAGNOSIS — Z79.4 TYPE 2 DIABETES MELLITUS WITH HYPERGLYCEMIA, WITH LONG-TERM CURRENT USE OF INSULIN (HCC): ICD-10-CM

## 2017-12-26 PROBLEM — E11.21 TYPE 2 DIABETES MELLITUS WITH NEPHROPATHY (HCC): Status: ACTIVE | Noted: 2017-12-26

## 2017-12-26 RX ORDER — VALSARTAN 80 MG/1
80 TABLET ORAL DAILY
Qty: 30 TAB | Refills: 5 | Status: SHIPPED | OUTPATIENT
Start: 2017-12-26 | End: 2018-05-29 | Stop reason: SDUPTHER

## 2017-12-26 RX ORDER — BUDESONIDE AND FORMOTEROL FUMARATE DIHYDRATE 160; 4.5 UG/1; UG/1
2 AEROSOL RESPIRATORY (INHALATION)
Qty: 2 INHALER | Refills: 3 | Status: SHIPPED | OUTPATIENT
Start: 2017-12-26

## 2017-12-26 RX ORDER — VALSARTAN 160 MG/1
TABLET ORAL
Qty: 90 TAB | Refills: 0 | Status: CANCELLED | OUTPATIENT
Start: 2017-12-26

## 2017-12-26 RX ORDER — POTASSIUM CHLORIDE 750 MG/1
TABLET, EXTENDED RELEASE ORAL
Qty: 30 TAB | Refills: 3 | Status: SHIPPED | OUTPATIENT
Start: 2017-12-26 | End: 2018-01-05 | Stop reason: SDUPTHER

## 2017-12-26 RX ORDER — LEVOTHYROXINE SODIUM 150 UG/1
TABLET ORAL
Qty: 90 TAB | Refills: 0 | Status: SHIPPED | OUTPATIENT
Start: 2017-12-26 | End: 2018-03-30 | Stop reason: SDUPTHER

## 2017-12-26 NOTE — PATIENT INSTRUCTIONS
Take lasix 40mg daily for one week then decrease to 5 days a week   Take losartan 80mg daily   We are checking kidney function today   Return for follow up in one month
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
## 2017-12-26 NOTE — MR AVS SNAPSHOT
Visit Information Date & Time Provider Department Dept. Phone Encounter #  
 12/26/2017  8:30 AM Armani Lau, [address] [phone] [id number] Follow-up Instructions Return in about 4 weeks (around 1/23/2018) for swelling , HTN. Your Appointments 1/2/2018  9:00 AM  
1 MONTH with Mary Clemons MD  
[address]) Appt Note: 1 month check; .  
 [address] [facility] [phone]  
Fa[phone]  
  
    
 3/28/2018  9:20 AM  
ESTABLISHED PATIENT with Isa Pizarro MD  
[facility]) Appt Note: 6 MO. F/U  
 [address]  
[phone] [address] Upcoming Health Maintenance Date Due Hepatitis C Screening 1960 Pneumococcal 19-64 Highest Risk (1 of 3 - PCV13) 7/28/1980 DTaP/Tdap/Td series (1 - Tdap) 7/28/1982 COLONOSCOPY 10/14/2016 PAP AKA CERVICAL CYTOLOGY 8/29/2017 EYE EXAM RETINAL OR DILATED Q1 11/15/2017 HEMOGLOBIN A1C Q6M 1/19/2018 MICROALBUMIN Q1 2/27/2018 LIPID PANEL Q1 2/27/2018 MEDICARE YEARLY EXAM 8/3/2018 FOOT EXAM Q1 12/26/2018 Allergies as of 12/26/2017  Review Complete On: 12/26/2017 By: Jesus Dodd LPN Severity Noted Reaction Type Reactions Iodinated Contrast- Oral And Iv Dye High 09/22/2017   Topical Rash 13 hr pre-medication prior to IV contrast  
 Carboplatin  08/29/2014    Other (comments) Patient developed shortness of breath, felt faint, coughing, skin flushed and \"itchy\". This occurred on the patients 8th treatment. Lipitor [Atorvastatin]  04/04/2014    Myalgia Percocet [Oxycodone-acetaminophen]  03/25/2012   Not Verified Other (comments) fever Shellfish Containing Products  10/04/2012    Hives Tape [Adhesive]  03/16/2017    Itching, Other (comments) \"op site-- clear,thin tape\"--caused blister Tomato  12/05/2017    Hives Current Immunizations  Reviewed on 8/29/2017 Name Date Influenza Vaccine (Quad) PF 12/13/2016 Not reviewed this visit You Were Diagnosed With   
  
 Codes Comments Elevated serum creatinine    -  Primary ICD-10-CM: R79.89 ICD-9-CM: 790.99 Edema, unspecified type     ICD-10-CM: R60.9 ICD-9-CM: 618. 3 Acquired hypothyroidism     ICD-10-CM: E03.9 ICD-9-CM: 387. 9 Type 2 diabetes mellitus with hyperglycemia, with long-term current use of insulin (HCC)     ICD-10-CM: E11.65, Z79.4 ICD-9-CM: 250.00, 790.29, V58.67 Essential hypertension     ICD-10-CM: I10 
ICD-9-CM: 401.9 Vitals BP Pulse Temp Resp Height(growth percentile) Weight(growth percentile) 155/84 (BP 1 Location: Left arm, BP Patient Position: Sitting) 69 97.4 °F (36.3 °C) 20 5' 9\" (1.753 m) 318 lb (144.2 kg) LMP SpO2 BMI OB Status Smoking Status 09/07/2011 96% 46.96 kg/m2 Hysterectomy Never Smoker BMI and BSA Data Body Mass Index Body Surface Area  
 46.96 kg/m 2 2.65 m 2 Preferred Pharmacy Pharmacy Name Phone [facility]/PHARMACY #[id number]- [id number] [address] [facility] [phone] Your Updated Medication List  
  
   
This list is accurate as of: 12/26/17  9:19 AM.  Always use your most recent med list.  
  
  
  
  
 * albuterol 90 mcg/actuation inhaler Commonly known as:  PROVENTIL HFA, VENTOLIN HFA, PROAIR HFA Take 2 Puffs by inhalation every four (4) hours as needed for Wheezing. * albuterol 2.5 mg /3 mL (0.083 %) nebulizer solution Commonly known as:  PROVENTIL VENTOLIN  
USE 1 VAIL VIA NEBULIZER EVERY 4 HOURS AS NEEDED FOR WHEEZING  
  
 ALLEGRA 180 mg tablet Generic drug:  fexofenadine Take 180 mg by mouth daily. aspirin delayed-release 81 mg tablet Take  by mouth daily. BD INSULIN PEN NEEDLE UF SHORT 31 gauge x 5/16\" Ndle Generic drug:  Insulin Needles (Disposable) USE WITH INSULIN TO INJECT FIVE TIMES DAILY. cholecalciferol (VITAMIN D3) 5,000 unit Tab tablet Commonly known as:  VITAMIN D3 Take 5,000 Units by mouth daily. clonazePAM 0.5 mg tablet Commonly known as:  Gwyndolyn Rasher Take 0.5 mg by mouth nightly as needed. diphenhydrAMINE 25 mg capsule Commonly known as:  BENADRYL Take 25 mg by mouth as needed. fluticasone 50 mcg/actuation nasal spray Commonly known as:  Flaquita Jumper 2 Sprays by Both Nostrils route daily. furosemide 40 mg tablet Commonly known as:  LASIX Take 80 mg by mouth as needed. * HYDROcodone-acetaminophen 5-325 mg per tablet Commonly known as:  Julaine Kava Take 1 Tab by mouth every four (4) hours as needed for Pain. Max Daily Amount: 6 Tabs. * HYDROcodone-acetaminophen 5-325 mg per tablet Commonly known as:  Julaine Kava Take 1 Tab by mouth every four (4) hours as needed for Pain. Max Daily Amount: 6 Tabs. insulin aspart 100 unit/mL Inpn Commonly known as:  Aaron Tricia INJECT 20 UNITS BEFORE EACH MEAL + 4 UNITS FOR EVERY 50 MG/DL ABOVE 150 MG/DL.  UNITS PER DAY  
  
 insulin detemir 100 unit/mL (3 mL) Inpn Commonly known as:  Nia Hirsch INJECT 30 UNITS IN AM AND 50 UNITS AT NIGHT. INCREASE AS DIRECTED TO  UNITS PER DAY--CALL [phone] FOR APPOINTMENT FOR MORE REFILLS  
  
 levothyroxine 150 mcg tablet Commonly known as:  SYNTHROID  
TAKE 1 TAB BY MOUTH DAILY (BEFORE BREAKFAST). --[facility] [phone] FOR APPOINTMENT FOR MORE REFILLS Liraglutide 0.6 mg/0.1 mL (18 mg/3 mL) Pnij Commonly known as:  VICTOZA  
1.8 mg by SubCUTAneous route daily (with lunch). montelukast 10 mg tablet Commonly known as:  SINGULAIR  
TAKE 1 TAB BY MOUTH DAILY. nystatin powder Commonly known as:  MYCOSTATIN  
 Apply  to affected area four (4) times daily as needed. omega-3 acid ethyl esters 1 gram capsule Commonly known as:  Santi Lundberg TAKE ONE CAPSULE BY MOUTH TWICE A DAY  
  
 ondansetron 8 mg disintegrating tablet Commonly known as:  ZOFRAN ODT Take 1 Tab by mouth every eight (8) hours as needed for Nausea. OTHER(NON-FORMULARY) 2 Tabs daily (after dinner). [facility] (vitamin) potassium chloride 10 mEq tablet Commonly known as:  KLOR-CON M10  
TAKE 1 TAB BY MOUTH TWO (2) DAYS A WEEK. WED AND FRI  
  
 RESTASIS 0.05 % ophthalmic emulsion Generic drug:  cycloSPORINE  
1 Drop two (2) times a day. sertraline 50 mg tablet Commonly known as:  ZOLOFT  
TAKE 1 TABLET BY MOUTH EVERY MORNING  
  
 SYMBICORT 160-4.5 mcg/actuation Hfaa Generic drug:  budesonide-formoterol Take 2 Puffs by inhalation two (2) times daily as needed. valsartan 80 mg tablet Commonly known as:  DIOVAN Take 1 Tab by mouth daily. * Notice: This list has 4 medication(s) that are the same as other medications prescribed for you. Read the directions carefully, and ask your doctor or other care provider to review them with you. Prescriptions Sent to Pharmacy Refills  
 insulin detemir (LEVEMIR FLEXTOUCH) 100 unit/mL (3 mL) inpn 3 Sig: INJECT 30 UNITS IN AM AND 50 UNITS AT NIGHT. INCREASE AS DIRECTED TO  UNITS PER DAY--CALL [phone] FOR APPOINTMENT FOR MORE REFILLS Class: Normal  
 Pharmacy: [facility]/pharmacy #[id number]- [address] Ph #: [phone]  
 potassium chloride (KLOR-CON M10) 10 mEq tablet 3 Sig: TAKE 1 TAB BY MOUTH TWO (2) DAYS A WEEK. WED AND FRI Class: Normal  
 Pharmacy: [facility]/pharmacy #[id number]- [place] [id number] [facility] Ph #: [phone]  
 levothyroxine (SYNTHROID) 150 mcg tablet 0 Sig: TAKE 1 TAB BY MOUTH DAILY (BEFORE BREAKFAST). --CALL [phone] FOR APPOINTMENT FOR MORE REFILLS Class: Normal  
 Pharmacy: [facility]/pharmacy #[id number]- [address] Ph #: [phone] SYMBICORT 160-4.5 mcg/actuation HFAA 3 Sig: Take 2 Puffs by inhalation two (2) times daily as needed. Class: Normal  
 Pharmacy: [facility]/pharmacy #[id number]- [place] [address] Ph #: [phone] Route: Inhalation Liraglutide (VICTOZA) 0.6 mg/0.1 mL (18 mg/3 mL) pnij 3 Si.8 mg by SubCUTAneous route daily (with lunch). Class: Normal  
 Pharmacy: [facility]/pharmacy #[id number]- [place] [address] Ph #: [phone] Route: SubCUTAneous  
 valsartan (DIOVAN) 80 mg tablet 5 Sig: Take 1 Tab by mouth daily. Class: Normal  
 [id number] [address] Ph #: [phone] Route: Oral  
  
We Performed the Following HEMOGLOBIN A1C WITH EAG [71222 CPT(R)] METABOLIC PANEL, COMPREHENSIVE [49054 CPT(R)] TSH AND FREE T4 [01529 CPT(R)] URINALYSIS W/ RFLX MICROSCOPIC [90563 CPT(R)] Follow-up Instructions Return in about 4 weeks (around 2018) for swelling , HTN. To-Do List   
 2017 Imaging:  XR CHEST PA LAT Patient Instructions Take lasix 40mg daily for one week then decrease to 5 days a week Take losartan 80mg daily We are checking kidney function today Return for follow up in one month [facility]! Dear Waleska Balderas: Thank you for requesting a [employer] account. Our records indicate that you already have an active [employer] account. You can access your account anytime at [email or web address] Did you know that you can access your hospital and ER discharge instructions at any time in [employer]? You can also review all of your test results from your hospital stay or ER visit. Additional Information If you have questions, please visit the Frequently Asked Questions section of the [employer] website at [email or web address] Remember, [employer] is NOT to be used for urgent needs. For medical emergencies, dial 911. Now available from your iPhone and Android! Please provide this summary of care documentation to your next provider. Your primary care clinician is listed as [place] Isabel. If you have any questions after today's visit, please call [phone].

## 2017-12-27 LAB
ALBUMIN SERPL-MCNC: 3.8 G/DL (ref 3.5–5.5)
ALBUMIN/GLOB SERPL: 0.9 {RATIO} (ref 1.2–2.2)
ALP SERPL-CCNC: 67 IU/L (ref 39–117)
ALT SERPL-CCNC: 12 IU/L (ref 0–32)
AST SERPL-CCNC: 22 IU/L (ref 0–40)
BILIRUB SERPL-MCNC: 0.3 MG/DL (ref 0–1.2)
BUN SERPL-MCNC: 28 MG/DL (ref 6–24)
BUN/CREAT SERPL: 24 (ref 9–23)
CALCIUM SERPL-MCNC: 9.4 MG/DL (ref 8.7–10.2)
CHLORIDE SERPL-SCNC: 108 MMOL/L (ref 96–106)
CO2 SERPL-SCNC: 18 MMOL/L (ref 18–29)
CREAT SERPL-MCNC: 1.18 MG/DL (ref 0.57–1)
EST. AVERAGE GLUCOSE BLD GHB EST-MCNC: 128 MG/DL
GLOBULIN SER CALC-MCNC: 4.4 G/DL (ref 1.5–4.5)
GLUCOSE SERPL-MCNC: 105 MG/DL (ref 65–99)
GLUCOSE UR QL: NORMAL
HBA1C MFR BLD: 6.1 % (ref 4.8–5.6)
KETONES UR QL STRIP: NORMAL
PH UR STRIP: NORMAL [PH]
POTASSIUM SERPL-SCNC: 5 MMOL/L (ref 3.5–5.2)
PROT SERPL-MCNC: 8.2 G/DL (ref 6–8.5)
PROT UR QL STRIP: NORMAL
SODIUM SERPL-SCNC: 143 MMOL/L (ref 134–144)
SP GR UR: NORMAL
T4 FREE SERPL-MCNC: 1.06 NG/DL (ref 0.82–1.77)
TSH SERPL DL<=0.005 MIU/L-ACNC: 2.29 UIU/ML (ref 0.45–4.5)

## 2017-12-30 DIAGNOSIS — J06.9 UPPER RESPIRATORY TRACT INFECTION, UNSPECIFIED TYPE: ICD-10-CM

## 2018-01-02 ENCOUNTER — OFFICE VISIT (OUTPATIENT)
Dept: GYNECOLOGY | Age: 58
End: 2018-01-02

## 2018-01-02 VITALS
SYSTOLIC BLOOD PRESSURE: 129 MMHG | BODY MASS INDEX: 43.4 KG/M2 | WEIGHT: 293 LBS | DIASTOLIC BLOOD PRESSURE: 69 MMHG | HEART RATE: 71 BPM | HEIGHT: 69 IN

## 2018-01-02 DIAGNOSIS — E66.01 MORBID OBESITY (HCC): ICD-10-CM

## 2018-01-02 DIAGNOSIS — Z79.4 CONTROLLED TYPE 2 DIABETES MELLITUS WITHOUT COMPLICATION, WITH LONG-TERM CURRENT USE OF INSULIN (HCC): ICD-10-CM

## 2018-01-02 DIAGNOSIS — C56.9 OVARIAN CANCER, UNSPECIFIED LATERALITY (HCC): Primary | ICD-10-CM

## 2018-01-02 DIAGNOSIS — C80.0 CARCINOMATOSIS (HCC): ICD-10-CM

## 2018-01-02 DIAGNOSIS — E11.9 CONTROLLED TYPE 2 DIABETES MELLITUS WITHOUT COMPLICATION, WITH LONG-TERM CURRENT USE OF INSULIN (HCC): ICD-10-CM

## 2018-01-02 RX ORDER — FLUTICASONE PROPIONATE 50 MCG
SPRAY, SUSPENSION (ML) NASAL
Qty: 2 BOTTLE | Refills: 3 | Status: SHIPPED | OUTPATIENT
Start: 2018-01-02 | End: 2018-04-10 | Stop reason: SDUPTHER

## 2018-01-02 NOTE — PROGRESS NOTES
[address] [place] [address]   Dr. Jigar Cardona. JAD Hernandez      Date of visit: 1/2/2018       Subjective:   Ms. Mihai Francisco is a long time pt of Dr. Angeles Contreras. She was initially dx with ovarian cancer in Sept 2012. She had MARAH/BSO/Cytoreductive surgery on 9/21/12. Pathology:   FINAL PATHOLOGIC DIAGNOSIS  1. Omentum, omentectomy:  Serous carcinoma, high grade  2. Soft tissue, umbilicus, biopsy:  Serous carcinoma, high grade  3. Peritoneum, periappendiceal, biopsy:  Serous carcinoma, high grade  4. Colonic mesentery, biopsy:  Serous carcinoma, high grade  See comment  5. Uterus (157 grams), ovaries and fallopian tubes, supracervical hysterectomy and bilateral salpingooophorectomy:  Cervix: No diagnostic pathologic abnormality  Myometrium: No diagnostic pathologic abnormality  Endometrium: Benign polyp and cystic atrophy  Ovaries and fallopian tubes: Serous carcinoma, high grade  Serosa: Serous carcinoma implants, high-grade  See synoptic report  Synoptic Report:  Specimen: Uterus, ovaries and fallopian tubes, omentum  Procedure: Supracervical hysterectomy, bilateral salpingo-oophorectomy, omentectomy  Lymph node sampling: No lymph nodes present  Specimen integrity:  Right ovary: Intact    At that time she was Stage III C. She recovered and began 6 cycles of Carbo/Taxol. Had good response, but reoccurred and in Fall of 2014     She received 3 cycles of Carbo/Taxol(retreatment) before having a reaction to Carbo and this was stopped. She continued her next 3 cycles with Doxil/Avastin. 3/2015-5/2015. Tolerated poorly with progressive disease documented (CT)  She had a treatment break from 3/2015 until she started with Topatecan for 11 cycles from 7/2015 through 3/2016  .    She was followed by Dr. Angeles Contreras and in Jan 2017, she again demonstrated progressive, yet asymptomatic disease via CT scan    She began treatment with Gemzar q21 days on Feb 2nd, six cycles completed 9/6/2017    EOD CT: 9/28/2017  FINDINGS:   LUNG BASES: Dependent atelectasis. Otherwise clear. INCIDENTALLY IMAGED HEART AND MEDIASTINUM: Unremarkable. LIVER: No mass or biliary dilatation. GALLBLADDER: Unremarkable. SPLEEN: No mass. PANCREAS: No mass or ductal dilatation. ADRENALS: Unremarkable. KIDNEYS: Nonobstructing stones measuring 2 to 3 mm in both kidneys with at least  7 right and 3 left stones. Left renal hilar fullness seen on prior unenhanced CT  is no longer evident and I suspect this was due to mild hydronephrosis and renal  pelvis distention. STOMACH: Unremarkable. SMALL BOWEL: No dilatation or wall thickening. COLON: No dilatation or wall thickening. APPENDIX: Unremarkable. PERITONEUM: Multiple peritoneal implants are redemonstrated with a 16 x 22 mm  lesion anterior to the level of the liver, multiple left omental lesions more  inferiorly measuring up to 2 x 2.4 cm, and multiple implants along the inferior  margin of the right lower liver measuring up to 3.4 x 3.6 cm. Additionally there  is a implant in the right paracolic gutter measuring 2.1 x 2.2 cm. Intrapelvic  masses measure up to 3.5 x 4.0 cm and the rectovaginal space. RETROPERITONEUM: Extensive bulky adenopathy with rocio hepatis adenopathy  measuring up to 5.0 x 6.5 cm and a mass along the tail of the pancreas measuring  proximally 4.0 x 5.2 cm. Aortocaval adenopathy measures up to 3.6 x 5.0 cm. Numerous pelvic adenopathy measures up to 3.3 x 5.2 cm in the left external  iliac chain and 2.7 x 2.0 cm in the right internal iliac chain. REPRODUCTIVE ORGANS: Uterus and ovaries are surgically absent. URINARY BLADDER: No mass or calculus. BONES: Stable degenerative spine change with endplate sclerosis and  benign-appearing lucencies within the L2 and L4 vertebral bodies, likely  reflecting hemangioma.   ADDITIONAL COMMENTS: There is skin thickening in the anterior lower abdominal  wall bilaterally and expected appearance to abdominal postsurgical incisional  change in the midline.     IMPRESSION:  1. Left renal hilar fullness is likely due to left renal pelvis distention on  prior exam which has since resolved. 2. Extensive intra-abdominal and nkechi metastatic disease appears grossly stable  as described above. 3. Intra-abdominal wall skin thickening may reflect cellulitis. Correlate with  clinical exam.      1/2/2018:  Seen today at a one month interval. Active, no restrictions. Recently re-  \"younger man\"   Happy  The patient remains essentially asymptomatic. Active, no restrictions. Normal bowel and bladder function. No N/V. Good appetite. Negative cardiopulmonary review. Component      Latest Ref Rng & Units 12/5/2017 8/28/2017 8/8/2017 7/28/2017          10:26 AM 12:25 PM 10:44 AM 10:01 AM   Cancer Ag (CA) 125      0.0 - 38.1 U/mL  183.8 (H)  157.4 (H)   CA-125      0 - 30 U/mL 141 (H)  160 (H)      Component      Latest Ref Rng & Units 12/26/2017 12/5/2017 8/28/2017 8/8/2017           9:32 AM 10:26 AM 12:25 PM 10:44 AM   BUN      6 - 24 mg/dL 28 (H) 27 (H) 26 (H) 17   Creatinine      0.57 - 1.00 mg/dL 1.18 (H) 1.47 (H) 1.16 (H) 1.03 (H)         Current Outpatient Prescriptions   Medication Sig    insulin detemir (LEVEMIR FLEXTOUCH) 100 unit/mL (3 mL) inpn INJECT 30 UNITS IN AM AND 50 UNITS AT NIGHT. INCREASE AS DIRECTED TO  UNITS PER DAY--CALL [phone] FOR APPOINTMENT FOR MORE REFILLS    potassium chloride (KLOR-CON M10) 10 mEq tablet TAKE 1 TAB BY MOUTH TWO (2) DAYS A WEEK. WED AND FRI    levothyroxine (SYNTHROID) 150 mcg tablet TAKE 1 TAB BY MOUTH DAILY (BEFORE BREAKFAST). --TPW[facility] [phone] FOR APPOINTMENT FOR MORE REFILLS    SYMBICORT 160-4.5 mcg/actuation HFAA Take 2 Puffs by inhalation two (2) times daily as needed.  Liraglutide (VICTOZA) 0.6 mg/0.1 mL (18 mg/3 mL) pnij 1.8 mg by SubCUTAneous route daily (with lunch).  valsartan (DIOVAN) 80 mg tablet Take 1 Tab by mouth daily.     HYDROcodone-acetaminophen (NORCO) 5-325 mg per tablet Take 1 Tab by mouth every four (4) hours as needed for Pain. Max Daily Amount: 6 Tabs.  montelukast (SINGULAIR) 10 mg tablet TAKE 1 TAB BY MOUTH DAILY.  omega-3 acid ethyl esters (LOVAZA) 1 gram capsule TAKE ONE CAPSULE BY MOUTH TWICE A DAY    diphenhydrAMINE (BENADRYL) 25 mg capsule Take 25 mg by mouth as needed.  HYDROcodone-acetaminophen (NORCO) 5-325 mg per tablet Take 1 Tab by mouth every four (4) hours as needed for Pain. Max Daily Amount: 6 Tabs.  albuterol (PROVENTIL VENTOLIN) 2.5 mg /3 mL (0.083 %) nebulizer solution USE 1 VAIL VIA NEBULIZER EVERY 4 HOURS AS NEEDED FOR WHEEZING    ondansetron (ZOFRAN ODT) 8 mg disintegrating tablet Take 1 Tab by mouth every eight (8) hours as needed for Nausea.  aspirin delayed-release 81 mg tablet Take  by mouth daily.  BD INSULIN PEN NEEDLE UF SHORT 31 gauge x 5/16\" ndle USE WITH INSULIN TO INJECT FIVE TIMES DAILY.  fluticasone (FLONASE) 50 mcg/actuation nasal spray 2 Sprays by Both Nostrils route daily.  clonazePAM (KLONOPIN) 0.5 mg tablet Take 0.5 mg by mouth nightly as needed.  nystatin (MYCOSTATIN) powder Apply  to affected area four (4) times daily as needed.  OTHER,NON-FORMULARY, 2 Tabs daily (after dinner). [facility] (vitamin)    insulin aspart (NOVOLOG FLEXPEN) 100 unit/mL inpn INJECT 20 UNITS BEFORE EACH MEAL + 4 UNITS FOR EVERY 50 MG/DL ABOVE 150 MG/DL.  UNITS PER DAY    cycloSPORINE (RESTASIS) 0.05 % ophthalmic emulsion 1 Drop two (2) times a day.  sertraline (ZOLOFT) 50 mg tablet TAKE 1 TABLET BY MOUTH EVERY MORNING    cholecalciferol, VITAMIN D3, (VITAMIN D3) 5,000 unit tab tablet Take 5,000 Units by mouth daily.  furosemide (LASIX) 40 mg tablet Take 80 mg by mouth as needed.  albuterol (PROVENTIL HFA, VENTOLIN HFA, PROAIR HFA) 90 mcg/actuation inhaler Take 2 Puffs by inhalation every four (4) hours as needed for Wheezing.     fexofenadine (ALLEGRA) 180 mg tablet Take 180 mg by mouth daily. No current facility-administered medications for this visit. Review of Systems:  General: She is in good spirits and her wt is stable. Active, working out, no restrictions. Delfino Jews HEENT: Denies visual changes, dysphagia or headache  Resp: Denies SOB, or change in ECKERT, wheezing or cough  CV: Denies CP, palpitations. GI/: Denies N/V, diarrhea, constipation or dysuria   Musk[place]el:Continues with DJD right knee pain. She said she was told she needed a TKR, but has not gotten it yet. Continues to take tylenol for pain when needed and rest leg  Neuro: Denies dizzyness or syncope    Objective:     Patient Vitals for the past 8 hrs:   BP Temp Pulse Resp Height Weight   09/06/17 1424 175/86 - 72 - - -   09/06/17 1107 146/84 97.5 °F (36.4 °C) 96 16 5' 8\" (1.727 m) 311 lb 9.6 oz (141.3 kg)       Physical Exam: Prior office examination. General: A&O X3 in NAD with pleasant affect. HEENT: Sclera anicteric, pupils equal and reactive to light. Mucosa pink, slightly dry again today  Neck: No JVD. No cervical adenopathy appreciated. Heart: Regular with persistent 2-3/6 murmur  Lungs: CTA Bilat without wheezing or rales. Decreased to bases due to effort, improves with encouragement of taking deep breath   Abd: Soft, obese, NT/ND with + BS throughout. No CVA tenderness noted  Ext: Without edema and + pedal pulses bilat  Neuro: grossly intact    No results found for this or any previous visit (from the past 12 hour(s)).       Assessment:     Patient Active Problem List   Diagnosis Code    Histor of ovarian cancer Z85.43    Carcinomatosis ([facility] C80.0    Controlled type 2 diabetes mellitus without complication, with long-term current use of insulin ([address] E11.9, Z79.4    Morbid obesity ([facility] E66.01    Abnormal mammogram R92.8    Sleep apnea G47.30    Dyslipidemia (high LDL; low HDL) E78.4    Benign essential hypertension I10    Disorder of thyroid E07.9    CINV (chemotherapy-induced nausea and vomiting) R11.2, T45.1X5A    Swelling of eye, bilateral--left > right H57.8    Portacath in place--left Z95.828    Chest pain, atypical R07.89    Hx of pulmonary embolus during pregnancy Z86.711, Z87.59    Elevated CA-125 R97.1    Ovarian cancer (HCC) C56.9    Hyperglycemia due to type 2 diabetes mellitus ([facility] E11.65    Preop cardiovascular exam Z01.810    Angina pectoris associated with type 2 diabetes mellitus (HCC) E11.59, I20.9    ARF (acute renal failure) (HCC) N17.9    Renal calculi N20.0    Elevated BUN R79.9    Pulmonary hypertension, mild I27.20    UTI (urinary tract infection) N39.0    Wheezing R06.2    Lymphadenopathy, mediastinal R59.0    Type 2 diabetes mellitus with nephropathy ([facility] E11.21         Plan:     Outside consultation with [facility]CA reviewed. Patient desires to continue her holistic Rx at this time in view of the CTCA consultation and asymptomatic state  I have informed her that we can discuss cytotoxic treatment at anytime. Return two month or PRN  All questions answered.     Encouraged to call for any concerns or questions  Cole Castillo MD

## 2018-01-05 DIAGNOSIS — R60.9 EDEMA, UNSPECIFIED TYPE: ICD-10-CM

## 2018-01-05 RX ORDER — POTASSIUM CHLORIDE 750 MG/1
TABLET, EXTENDED RELEASE ORAL
Qty: 90 TAB | Refills: 1 | Status: SHIPPED | OUTPATIENT
Start: 2018-01-05 | End: 2018-11-14 | Stop reason: SDUPTHER

## 2018-01-05 NOTE — TELEPHONE ENCOUNTER
Last Refill: 12/26/2017     Last Office Visit: 12/26/2017 Zuleika Marcano    Upcoming Appointment: None

## 2018-02-14 ENCOUNTER — OFFICE VISIT (OUTPATIENT)
Dept: INTERNAL MEDICINE CLINIC | Age: 58
End: 2018-02-14

## 2018-02-14 VITALS
OXYGEN SATURATION: 96 % | RESPIRATION RATE: 16 BRPM | DIASTOLIC BLOOD PRESSURE: 86 MMHG | BODY MASS INDEX: 43.4 KG/M2 | WEIGHT: 293 LBS | TEMPERATURE: 99.1 F | HEIGHT: 69 IN | SYSTOLIC BLOOD PRESSURE: 136 MMHG | HEART RATE: 86 BPM

## 2018-02-14 DIAGNOSIS — K21.9 GASTROESOPHAGEAL REFLUX DISEASE, ESOPHAGITIS PRESENCE NOT SPECIFIED: ICD-10-CM

## 2018-02-14 DIAGNOSIS — Z11.59 ENCOUNTER FOR HEPATITIS C SCREENING TEST FOR LOW RISK PATIENT: ICD-10-CM

## 2018-02-14 DIAGNOSIS — E78.5 DYSLIPIDEMIA (HIGH LDL; LOW HDL): ICD-10-CM

## 2018-02-14 DIAGNOSIS — I27.20 PULMONARY HYPERTENSION, MILD (HCC): ICD-10-CM

## 2018-02-14 DIAGNOSIS — C56.9 MALIGNANT NEOPLASM OF OVARY, UNSPECIFIED LATERALITY (HCC): ICD-10-CM

## 2018-02-14 DIAGNOSIS — E11.65 TYPE 2 DIABETES MELLITUS WITH HYPERGLYCEMIA, WITH LONG-TERM CURRENT USE OF INSULIN (HCC): Primary | ICD-10-CM

## 2018-02-14 DIAGNOSIS — I10 BENIGN ESSENTIAL HYPERTENSION: ICD-10-CM

## 2018-02-14 DIAGNOSIS — Z87.59 HX OF PULMONARY EMBOLUS DURING PREGNANCY: ICD-10-CM

## 2018-02-14 DIAGNOSIS — E07.9 DISORDER OF THYROID: ICD-10-CM

## 2018-02-14 DIAGNOSIS — Z79.4 TYPE 2 DIABETES MELLITUS WITH HYPERGLYCEMIA, WITH LONG-TERM CURRENT USE OF INSULIN (HCC): Primary | ICD-10-CM

## 2018-02-14 DIAGNOSIS — Z86.711 HX OF PULMONARY EMBOLUS DURING PREGNANCY: ICD-10-CM

## 2018-02-14 DIAGNOSIS — Z00.00 ROUTINE GENERAL MEDICAL EXAMINATION AT A HEALTH CARE FACILITY: ICD-10-CM

## 2018-02-14 RX ORDER — INSULIN ASPART 100 [IU]/ML
INJECTION, SOLUTION INTRAVENOUS; SUBCUTANEOUS
Qty: 30 ML | Refills: 11 | Status: SHIPPED | OUTPATIENT
Start: 2018-02-14 | End: 2018-10-29 | Stop reason: SDUPTHER

## 2018-02-14 RX ORDER — DEXLANSOPRAZOLE 60 MG/1
60 CAPSULE, DELAYED RELEASE ORAL DAILY
Qty: 30 CAP | Refills: 11 | Status: SHIPPED | OUTPATIENT
Start: 2018-02-14 | End: 2018-05-08 | Stop reason: SDUPTHER

## 2018-02-14 NOTE — MR AVS SNAPSHOT
[address] [facility] 
[phone] Patient: Yoana De La Garza MRN: [id number] :1960 Visit Information Date & Time Provider Department Dept. Phone Encounter #  
 2018 10:00 AM Trudi De La Rosa, [address]  [id number] Follow-up Instructions Return in about 4 months (around 2018) for DM. Follow-up and Disposition History Your Appointments 3/28/2018  9:20 AM  
ESTABLISHED PATIENT with Joaquin Reed MD  
[facility]) Appt Note: 6 MO. F/U  
 [address] [place]  
[phone] [address] Upcoming Health Maintenance Date Due Hepatitis C Screening 1960 DTaP/Tdap/Td series (1 - Tdap) 1982 COLONOSCOPY 10/14/2016 PAP AKA CERVICAL CYTOLOGY 2017 EYE EXAM RETINAL OR DILATED Q1 11/15/2017 MICROALBUMIN Q1 2018 LIPID PANEL Q1 2018 HEMOGLOBIN A1C Q6M 2018 MEDICARE YEARLY EXAM 8/3/2018 BREAST CANCER SCRN MAMMOGRAM 2018 Pneumococcal 19-64 Highest Risk (2 of 3 - PCV13) 10/24/2018 FOOT EXAM Q1 2018 Allergies as of 2018  Review Complete On: 2018 By: Tereza Session Severity Noted Reaction Type Reactions Iodinated Contrast- Oral And Iv Dye High 2017   Topical Rash 13 hr pre-medication prior to IV contrast  
 Carboplatin  2014    Other (comments) Patient developed shortness of breath, felt faint, coughing, skin flushed and \"itchy\". This occurred on the patients 8th treatment. Lipitor [Atorvastatin]  2014    Myalgia Percocet [Oxycodone-acetaminophen]  2012   Not Verified Other (comments) fever Shellfish Containing Products  10/04/2012    Hives Tape [Adhesive]  03/16/2017    Itching, Other (comments) \"op site-- clear,thin tape\"--caused blister Tomato  12/05/2017    Hives Current Immunizations  Reviewed on 8/29/2017 Name Date Influenza Vaccine (Quad) PF 12/13/2016 Not reviewed this visit You Were Diagnosed With   
  
 Codes Comments Type 2 diabetes mellitus with hyperglycemia, with long-term current use of insulin (HCC)    -  Primary ICD-10-CM: E11.65, Z79.4 ICD-9-CM: 250.00, 790.29, V58.67 Malignant neoplasm of ovary, unspecified laterality ([facility] [address]     ICD-10-CM: C56.9 ICD-9-CM: 183.0 Benign essential hypertension     ICD-10-CM: I10 
ICD-9-CM: 401.1 Disorder of thyroid     ICD-10-CM: E07.9 ICD-9-CM: 246.9 Pulmonary hypertension, mild     ICD-10-CM: I27.20 ICD-9-CM: 416.8 Hx of pulmonary embolus during pregnancy     ICD-10-CM: Z86.711, Z87.59 
ICD-9-CM: V12.51 Dyslipidemia (high LDL; low HDL)     ICD-10-CM: E78.4 ICD-9-CM: 272.4 Encounter for hepatitis C screening test for low risk patient     ICD-10-CM: Z11.59 
ICD-9-CM: V73.89 Gastroesophageal reflux disease, esophagitis presence not specified     ICD-10-CM: K21.9 ICD-9-CM: 530.81 Routine general medical examination at a health care facility     ICD-10-CM: Z00.00 ICD-9-CM: V70.0 Vitals BP Pulse Temp Resp Height(growth percentile) Weight(growth percentile) 136/86 (BP 1 Location: Left arm, BP Patient Position: Sitting) 86 99.1 °F (37.3 °C) (Oral) 16 5' 9\" (1.753 m) 316 lb (143.3 kg) LMP SpO2 BMI OB Status Smoking Status 09/07/2011 96% 46.67 kg/m2 Hysterectomy Never Smoker Vitals History BMI and BSA Data Body Mass Index Body Surface Area  
 46.67 kg/m 2 2.64 m 2 Preferred Pharmacy Pharmacy Name Phone [id number] [id number], [address] [phone] Your Updated Medication List  
  
   
 This list is accurate as of: 2/14/18 10:47 AM.  Always use your most recent med list.  
  
  
  
  
 * albuterol 90 mcg/actuation inhaler Commonly known as:  PROVENTIL HFA, VENTOLIN HFA, PROAIR HFA Take 2 Puffs by inhalation every four (4) hours as needed for Wheezing. * albuterol 2.5 mg /3 mL (0.083 %) nebulizer solution Commonly known as:  PROVENTIL VENTOLIN  
USE 1 VAIL VIA NEBULIZER EVERY 4 HOURS AS NEEDED FOR WHEEZING  
  
 ALLEGRA 180 mg tablet Generic drug:  fexofenadine Take 180 mg by mouth daily. aspirin delayed-release 81 mg tablet Take  by mouth daily. BD INSULIN PEN NEEDLE UF SHORT 31 gauge x 5/16\" Ndle Generic drug:  Insulin Needles (Disposable) USE WITH INSULIN TO INJECT FIVE TIMES DAILY. cholecalciferol (VITAMIN D3) 5,000 unit Tab tablet Commonly known as:  VITAMIN D3 Take 5,000 Units by mouth daily. clonazePAM 0.5 mg tablet Commonly known as:  Gib Loge Take 0.5 mg by mouth nightly as needed. Dexlansoprazole 60 mg Cpdb Commonly known as:  DEXILANT Take 1 Cap by mouth daily. diphenhydrAMINE 25 mg capsule Commonly known as:  BENADRYL Take 25 mg by mouth as needed. * fluticasone 50 mcg/actuation nasal spray Commonly known as:  Marlaine Romance 2 Sprays by Both Nostrils route daily. * fluticasone 50 mcg/actuation nasal spray Commonly known as:  FLONASE  
2 SPRAYS IN BOTH NOSTRILS DAILY  
  
 furosemide 40 mg tablet Commonly known as:  LASIX Take 80 mg by mouth as needed. * HYDROcodone-acetaminophen 5-325 mg per tablet Commonly known as:  Silvia Aviles Take 1 Tab by mouth every four (4) hours as needed for Pain. Max Daily Amount: 6 Tabs. * HYDROcodone-acetaminophen 5-325 mg per tablet Commonly known as:  Silvia Stefan Take 1 Tab by mouth every four (4) hours as needed for Pain. Max Daily Amount: 6 Tabs. insulin aspart 100 unit/mL Inpn Commonly known as:  Dhara Mcclelland INJECT 20 UNITS BEFORE EACH MEAL + 4 UNITS FOR EVERY 50 MG/DL ABOVE 150 MG/DL.  UNITS PER DAY  
  
 insulin detemir 100 unit/mL (3 mL) Inpn Commonly known as:  Peggy Benavides INJECT 30 UNITS IN AM AND 50 UNITS AT NIGHT. INCREASE AS DIRECTED TO  UNITS PER DAY--CALL [phone] FOR APPOINTMENT FOR MORE REFILLS  
  
 levothyroxine 150 mcg tablet Commonly known as:  SYNTHROID  
TAKE 1 TAB BY MOUTH DAILY (BEFORE BREAKFAST). --[facility] [phone] FOR APPOINTMENT FOR MORE REFILLS Liraglutide 0.6 mg/0.1 mL (18 mg/3 mL) Pnij Commonly known as:  VICTOZA  
1.8 mg by SubCUTAneous route daily (with lunch). montelukast 10 mg tablet Commonly known as:  SINGULAIR  
TAKE 1 TAB BY MOUTH DAILY. nystatin powder Commonly known as:  MYCOSTATIN Apply  to affected area four (4) times daily as needed. omega-3 acid ethyl esters 1 gram capsule Commonly known as:  Ellamae Bellis TAKE ONE CAPSULE BY MOUTH TWICE A DAY  
  
 ondansetron 8 mg disintegrating tablet Commonly known as:  ZOFRAN ODT Take 1 Tab by mouth every eight (8) hours as needed for Nausea. OTHER(NON-FORMULARY) 2 Tabs daily (after dinner). [facility] (vitamin) potassium chloride 10 mEq tablet Commonly known as:  KLOR-CON M10  
TAKE 1 TAB BY MOUTH TWO (2) DAYS A WEEK. WED AND FRI  
  
 RESTASIS 0.05 % ophthalmic emulsion Generic drug:  cycloSPORINE  
1 Drop two (2) times a day. sertraline 50 mg tablet Commonly known as:  ZOLOFT  
TAKE 1 TABLET BY MOUTH EVERY MORNING  
  
 SYMBICORT 160-4.5 mcg/actuation Hfaa Generic drug:  budesonide-formoterol Take 2 Puffs by inhalation two (2) times daily as needed. valsartan 80 mg tablet Commonly known as:  DIOVAN Take 1 Tab by mouth daily. * Notice: This list has 6 medication(s) that are the same as other medications prescribed for you. Read the directions carefully, and ask your doctor or other care provider to review them with you. Prescriptions Sent to Pharmacy Refills  
 insulin detemir (LEVEMIR FLEXTOUCH) 100 unit/mL (3 mL) inpn 3 Sig: INJECT 30 UNITS IN AM AND 50 UNITS AT NIGHT. INCREASE AS DIRECTED TO  UNITS PER DAY--CALL [phone] FOR APPOINTMENT FOR MORE REFILLS Class: Normal  
 Pharmacy: [id number]- [address] Ph #: [phone] insulin aspart (NOVOLOG FLEXPEN) 100 unit/mL inpn 11 Sig: INJECT 20 UNITS BEFORE EACH MEAL + 4 UNITS FOR EVERY 50 MG/DL ABOVE 150 MG/DL.  UNITS PER DAY Class: Normal  
 Pharmacy: [facility]/pharmacy #[id number]- [address] Ph #: [phone] Liraglutide (VICTOZA) 0.6 mg/0.1 mL (18 mg/3 mL) pnij 3 Si.8 mg by SubCUTAneous route daily (with lunch). Class: Normal  
 Pharm[id number] Ph #: [phone] Route: SubCUTAneous Dexlansoprazole (DEXILANT) 60 mg CpDB 11 Sig: Take 1 Cap by mouth daily. Class: Normal  
 Pharmacy: [facility]/pharmacy #[id number]- [address] Ph #: [phone] Route: Oral  
  
We Performed the Following 10-PANEL URINE DRUG SCREEN [[id number] Custom] REFERRAL TO GYNECOLOGY [REF30 Custom] Follow-up Instructions Return in about 4 months (around 2018) for DM. To-Do List   
 05/15/2018 Lab:  CANCER ANTIGEN 125   
  
 05/15/2018 Lab:  CBC WITH AUTOMATED DIFF   
  
 05/15/2018 Lab:  HEMOGLOBIN A1C WITH EAG   
  
 05/15/2018 Lab:  HEPATITIS C AB   
  
 05/15/2018 Lab:  LIPID PANEL   
  
 05/15/2018 Lab:  METABOLIC PANEL, COMPREHENSIVE   
  
 05/15/2018 Lab:  T4, FREE   
  
 05/15/2018 Lab:  TSH 3RD GENERATION Referral Information  Referral ID Referred By Referred To  
  
 [id number] Alessandro Lo MD   
 [address] [place] [address] Tucker Gillis Phone: [phone] Fax: [phone] Visits Status Start Date End Date 1 New Request 2/14/18 2/14/19 If your referral has a status of pending review or denied, additional information will be sent to support the outcome of this decision. Introducing [facility]! Dear Baljit Finley: Thank you for requesting a [employer] account. Our records indicate that you already have an active [employer] account. You can access your account anytime at [email or web address] Did you know that you can access your hospital and ER discharge instructions at any time in [employer]? You can also review all of your test results from your hospital stay or ER visit. Additional Information If you have questions, please visit the Frequently Asked Questions section of the [employer] website at [email or web address] Remember, [employer] is NOT to be used for urgent needs. For medical emergencies, dial 911. Now available from your iPhone and Android! Please provide this summary of care documentation to your next provider. Your primary care clinician is listed as [place]. If you have any questions after today's visit, please call [phone].

## 2018-02-14 NOTE — PROGRESS NOTES
SUBJECTIVE:   Ms. Max Patel is a 65 y.o. female who is here for follow up of routine medical issues. Chief Complaint   Patient presents with   [address]     pt here today to establish care with Dr Wendi Mackey Immunization/Injection     tb test    Drug Screen     pt wants to to volunteer work and needs to get a drug screenging    Diabetes     pt had last eye exam 4/2017     Medication Evaluation     pt wants to discuss furosemide and dexilant        History of ovarian cancer, s/p MARAH and chemotherapy. Dr. Dayan Ramirez. DM, doing well. Had been seeing Dr. Deondre Villegas, but he has released her. She wants to do some volunteer driving of people to appointments. Needs 10 panel drug screen. She says \"I recently  a [place] man, and he cooks stuff that is very different for me. \"   At this time, she is otherwise doing well and has brought no other complaints to my attention today. For a list of the medical issues addressed today, see the assessment and plan below. PMH:   Past Medical History:   Diagnosis Date    Adverse effect of anesthesia     sleep apnea cpap machine useage     Anemia     Arthritis     DDD low back and knees    Asthma     uses albuterol prn only; none x 6 mos.   Never hospitalized    Autoimmune disease ([address]     FIBROMYALGIA    BRCA negative 1/2013    Calculus of kidney     Chronic kidney disease     kidney stones    Chronic pain     knee pain bilat    CINV (chemotherapy-induced nausea and vomiting) 8/21/2014    Diabetes ([address] Age 46    IDDM    Environmental allergies     Fibromyalgia     GERD (gastroesophageal reflux disease)     controlled with med    Hypertension     Ill-defined condition     Sepsis 9-2015 -  SOURCE PORT    Ill-defined condition 2016    chemotherapy     Morbid obesity ([address]     Murmur, heart     Other and unspecified hyperlipidemia     Ovarian cancer ([address] 9/2012, 1/2014    serous, high grade, stage IIIc. s/p chemotx (has port)    Psychiatric disorder     Rectal bleeding     Thromboembolus ([facility] [address]     POST PARTUM; resolved- PELVIS    Thyroid disease     HYPOTHYROID    Unspecified sleep apnea     CPAP, Dr. Romana Dumont       Past Surgical History:   Procedure Laterality Date    BREAST SURGERY PROCEDURE UNLISTED      Lymph node in left breast 2017    CHEST SURGERY PROCEDURE UNLISTED      Placement of port a cath right chest    HX GYN       X2    HX HYSTERECTOMY  2012    ROULA/BSO, tumor debulking, omentectomy for ovarian cancer    HX ORTHOPAEDIC      ankle-FX, R    HX ORTHOPAEDIC      FX R ARM    HX UROLOGICAL      stent    HX VASCULAR ACCESS  2015    right chest- port placed       All: is allergic to iodinated contrast- oral and iv dye; carboplatin; lipitor [atorvastatin]; percocet [oxycodone-acetaminophen]; shellfish containing products; tape [adhesive]; and tomato. Current Outpatient Prescriptions   Medication Sig    potassium chloride (KLOR-CON M10) 10 mEq tablet TAKE 1 TAB BY MOUTH TWO (2) DAYS A WEEK. WED AND FRI    fluticasone (FLONASE) 50 mcg/actuation nasal spray 2 SPRAYS IN BOTH NOSTRILS DAILY    insulin detemir (LEVEMIR FLEXTOUCH) 100 unit/mL (3 mL) inpn INJECT 30 UNITS IN AM AND 50 UNITS AT NIGHT. INCREASE AS DIRECTED TO  UNITS PER DAY--CALL [phone] FOR APPOINTMENT FOR MORE REFILLS    levothyroxine (SYNTHROID) 150 mcg tablet TAKE 1 TAB BY MOUTH DAILY (BEFORE BREAKFAST). --[facility] [phone] FOR APPOINTMENT FOR MORE REFILLS    SYMBICORT 160-4.5 mcg/actuation HFAA Take 2 Puffs by inhalation two (2) times daily as needed.  Liraglutide (VICTOZA) 0.6 mg/0.1 mL (18 mg/3 mL) pnij 1.8 mg by SubCUTAneous route daily (with lunch).  valsartan (DIOVAN) 80 mg tablet Take 1 Tab by mouth daily.  HYDROcodone-acetaminophen (NORCO) 5-325 mg per tablet Take 1 Tab by mouth every four (4) hours as needed for Pain. Max Daily Amount: 6 Tabs.  montelukast (SINGULAIR) 10 mg tablet TAKE 1 TAB BY MOUTH DAILY.     omega-3 acid ethyl esters (LOVAZA) 1 gram capsule TAKE ONE CAPSULE BY MOUTH TWICE A DAY    diphenhydrAMINE (BENADRYL) 25 mg capsule Take 25 mg by mouth as needed.  HYDROcodone-acetaminophen (NORCO) 5-325 mg per tablet Take 1 Tab by mouth every four (4) hours as needed for Pain. Max Daily Amount: 6 Tabs.  albuterol (PROVENTIL VENTOLIN) 2.5 mg /3 mL (0.083 %) nebulizer solution USE 1 VAIL VIA NEBULIZER EVERY 4 HOURS AS NEEDED FOR WHEEZING    ondansetron (ZOFRAN ODT) 8 mg disintegrating tablet Take 1 Tab by mouth every eight (8) hours as needed for Nausea.  aspirin delayed-release 81 mg tablet Take  by mouth daily.  BD INSULIN PEN NEEDLE UF SHORT 31 gauge x 5/16\" ndle USE WITH INSULIN TO INJECT FIVE TIMES DAILY.  fluticasone (FLONASE) 50 mcg/actuation nasal spray 2 Sprays by Both Nostrils route daily.  clonazePAM (KLONOPIN) 0.5 mg tablet Take 0.5 mg by mouth nightly as needed.  nystatin (MYCOSTATIN) powder Apply  to affected area four (4) times daily as needed.  OTHER,NON-FORMULARY, 2 Tabs daily (after dinner). [facility] (vitamin)    insulin aspart (NOVOLOG FLEXPEN) 100 unit/mL inpn INJECT 20 UNITS BEFORE EACH MEAL + 4 UNITS FOR EVERY 50 MG/DL ABOVE 150 MG/DL.  UNITS PER DAY    cycloSPORINE (RESTASIS) 0.05 % ophthalmic emulsion 1 Drop two (2) times a day.  sertraline (ZOLOFT) 50 mg tablet TAKE 1 TABLET BY MOUTH EVERY MORNING    cholecalciferol, VITAMIN D3, (VITAMIN D3) 5,000 unit tab tablet Take 5,000 Units by mouth daily.  furosemide (LASIX) 40 mg tablet Take 80 mg by mouth as needed.  albuterol (PROVENTIL HFA, VENTOLIN HFA, PROAIR HFA) 90 mcg/actuation inhaler Take 2 Puffs by inhalation every four (4) hours as needed for Wheezing.  fexofenadine (ALLEGRA) 180 mg tablet Take 180 mg by mouth daily. No current facility-administered medications for this visit. FH: Her family history includes Anesth Problems in her son;  Arthritis-osteo in her brother, father, and mother; Cancer in her brother and father; Diabetes in her maternal grandmother; Elevated Lipids in her brother and brother; Hypertension in her brother, brother, father, and mother; Lung Disease in her father; Obesity in her brother. SH: . Disabled. Former nurse, who does volunteer work and foster parenting. She reports that she has never smoked. She has never used smokeless tobacco. She reports that she drinks alcohol. She reports that she does not use illicit drugs. ROS: See above; Complete ROS otherwise negative. OBJECTIVE:   Vitals:   Visit Vitals    /86 (BP 1 Location: Left arm, BP Patient Position: Sitting)    Pulse 86    Temp 99.1 °F (37.3 °C) (Oral)    Resp 16    Ht 5' 9\" (1.753 m)    Wt 316 lb (143.3 kg)    LMP 09/07/2011    SpO2 96%    BMI 46.67 kg/m2      Gen: Pleasant 65 y.o.  female in NAD. HEENT: PERRLA. EOMI. OP moist and pink. Neck: Supple. No LAD. HEART: RRR, No M/G/R.    LUNGS: CTAB No W/R. ABDOMEN: S, NT, ND, BS+. EXTREMITIES: Warm. 2-3+ edema, L > R.  MUSCULOSKELETAL: Normal ROM, muscle strength 5/5 all groups. NEURO: Alert and oriented x 3. Cranial nerves grossly intact. No focal sensory or motor deficits noted. SKIN: Warm. Dry. No rashes or other lesions noted. Lab Results   Component Value Date/Time    Sodium 143 12/26/2017 09:32 AM    Potassium 5.0 12/26/2017 09:32 AM    Chloride 108 (H) 12/26/2017 09:32 AM    CO2 18 12/26/2017 09:32 AM    Anion gap 7 12/05/2017 10:26 AM    Glucose 105 (H) 12/26/2017 09:32 AM    BUN 28 (H) 12/26/2017 09:32 AM    Creatinine 1.18 (H) 12/26/2017 09:32 AM    BUN/Creatinine ratio 24 (H) 12/26/2017 09:32 AM    GFR est AA 60 12/26/2017 09:32 AM    GFR est non-AA 52 (L) 12/26/2017 09:32 AM    Calcium 9.4 12/26/2017 09:32 AM    Bilirubin, total 0.3 12/26/2017 09:32 AM    ALT (SGPT) 12 12/26/2017 09:32 AM    AST (SGOT) 22 12/26/2017 09:32 AM    Alk.  phosphatase 67 12/26/2017 09:32 AM    Protein, total 8.2 12/26/2017 09:32 AM    Albumin 3.8 12/26/2017 09:32 AM    Globulin 5.3 (H) 12/05/2017 10:26 AM    A-G Ratio 0.9 (L) 12/26/2017 09:32 AM       Lab Results   Component Value Date/Time    Cholesterol, total 241 (H) 02/27/2017 11:39 AM    HDL Cholesterol 34 (L) 02/27/2017 11:39 AM    LDL, calculated 169 (H) 02/27/2017 11:39 AM    Triglyceride 189 (H) 02/27/2017 11:39 AM        Lab Results   Component Value Date/Time    Hemoglobin A1c 6.1 (H) 12/26/2017 09:32 AM       Lab Results   Component Value Date/Time    WBC 9.0 12/05/2017 10:26 AM    Hemoglobin (POC) 10.5 (L) 03/20/2017 12:15 PM    HGB 9.5 (L) 12/05/2017 10:26 AM    Hematocrit (POC) 31 (L) 03/20/2017 12:15 PM    HCT 30.7 (L) 12/05/2017 10:26 AM    PLATELET 315 79/38/4349 10:26 AM    MCV 91.9 12/05/2017 10:26 AM         ASSESSMENT/ PLAN: Diagnoses and all orders for this visit:    1. Type 2 diabetes mellitus with hyperglycemia, with long-term current use of insulin (HCC)  -     insulin detemir (LEVEMIR FLEXTOUCH) 100 unit/mL (3 mL) inpn; INJECT 30 UNITS IN AM AND 50 UNITS AT NIGHT. INCREASE AS DIRECTED TO  UNITS PER DAY--CALL [phone] FOR APPOINTMENT FOR MORE REFILLS  -     insulin aspart (NOVOLOG FLEXPEN) 100 unit/mL inpn; INJECT 20 UNITS BEFORE EACH MEAL + 4 UNITS FOR EVERY 50 MG/DL ABOVE 150 MG/DL.  UNITS PER DAY  -     Liraglutide (VICTOZA) 0.6 mg/0.1 mL (18 mg/3 mL) pnij; 1.8 mg by SubCUTAneous route daily (with lunch). -     METABOLIC PANEL, COMPREHENSIVE; Future  -     LIPID PANEL; Future  -     CBC WITH AUTOMATED DIFF; Future  -     HEMOGLOBIN A1C WITH EAG; Future    2. Malignant neoplasm of ovary, unspecified laterality ([facility] [address]  -     REFERRAL TO GYNECOLOGY  -     METABOLIC PANEL, COMPREHENSIVE; Future  -     CBC WITH AUTOMATED DIFF; Future  -     CANCER ANTIGEN 125; Future    3. Benign essential hypertension  -     METABOLIC PANEL, COMPREHENSIVE; Future  -     CBC WITH AUTOMATED DIFF; Future    4. Disorder of thyroid  -     TSH 3RD GENERATION;  Future  - T4, FREE; Future    5. Pulmonary hypertension, mild    6. Hx of pulmonary embolus during pregnancy    7. Dyslipidemia (high LDL; low HDL)  -     METABOLIC PANEL, COMPREHENSIVE; Future    8. Encounter for hepatitis C screening test for low risk patient  -     HEPATITIS C AB; Future    9. Gastroesophageal reflux disease, esophagitis presence not specified  -     Dexlansoprazole (DEXILANT) 60 mg CpDB; Take 1 Cap by mouth daily. Follow-up Disposition:  Return in about 4 months (around 6/14/2018) for DM. I have reviewed the patient's medications and risks/side effects/benefits were discussed. Diagnosis(-es) explained to patient and questions answered. Literature provided where appropriate.

## 2018-02-15 ENCOUNTER — TELEPHONE (OUTPATIENT)
Dept: INTERNAL MEDICINE CLINIC | Age: 58
End: 2018-02-15

## 2018-02-15 LAB
AMPHETAMINES UR QL SCN: NEGATIVE NG/ML
BARBITURATES UR QL SCN: NEGATIVE NG/ML
BENZODIAZ UR QL: NEGATIVE NG/ML
BZE UR QL: NEGATIVE NG/ML
CANNABINOIDS UR QL SCN: NEGATIVE NG/ML
MDMA UR QL SCN: NEGATIVE NG/ML
METHADONE UR QL SCN: NEGATIVE NG/ML
METHAQUALONE UR QL: NEGATIVE NG/ML
OPIATES UR QL: NEGATIVE NG/ML
PCP UR QL: NEGATIVE NG/ML
PROPOXYPH UR QL: NEGATIVE NG/ML

## 2018-02-19 ENCOUNTER — TELEPHONE (OUTPATIENT)
Dept: INTERNAL MEDICINE CLINIC | Age: 58
End: 2018-02-19

## 2018-03-06 DIAGNOSIS — E11.65 TYPE 2 DIABETES MELLITUS WITH HYPERGLYCEMIA, WITH LONG-TERM CURRENT USE OF INSULIN (HCC): ICD-10-CM

## 2018-03-06 DIAGNOSIS — Z79.4 TYPE 2 DIABETES MELLITUS WITH HYPERGLYCEMIA, WITH LONG-TERM CURRENT USE OF INSULIN (HCC): ICD-10-CM

## 2018-03-06 NOTE — TELEPHONE ENCOUNTER
PCP: Ricardo Sandoval MD    Last appt: 2018  Future Appointments  Date Time Provider Gabriel Phillip   3/28/2018 9:20 AM Attila Bauer MD [facility]   2018 9:15 AM Ricardo Sandoval MD [facility]       Requested Prescriptions     Pending Prescriptions Disp Refills    Liraglutide (VICTOZA) 0.6 mg/0.1 mL (18 mg/3 mL) pnij 3 Pen 3     Si.8 mg by SubCUTAneous route daily (with lunch).

## 2018-03-07 NOTE — H&P
3/20/17  H&P  Sophia Castillo is a 55year old [de-identified] or [place] American female who presents today for \"hematuria, hx of kidney stones\". She returns for follow-up. See recent note and workup by [facility] which was reviewed. She is here with her son. History of1/15: Cystoscopy, bilateral retrograde pyelograms, left ureterorenoscopy. No stone found previously seen on CT in [place] and stented for Proteus urosepsis. She reports a UTI treated by with Cipro about 2 weeks ago. Urine culture from yesterday pending, on Ceftin. Had some dysuria this time, no fever. Normal white count with mild renal insufficiency found. 2/8/17 CT reviewed demonstrates 1 cm left intrarenal stone with some intrarenal hydro-and some small nonobstructing stones. Small nonobstructing stones on the right. Massive peritoneal carcinomatosis, retroperitoneal pelvic and right inguinal adenopathy. Last chemotherapy a week ago. PET scan another workup done recently by Dr. Lizzy Red. She feels amazingly well, but has some right flank pain which I suspect is related to her metastatic cancer. Astrid [place] not visualized on plain film. PAST MEDICAL HISTORY:    Allergies: PERCOCET (OXYCODONE-ACETAMINOPHEN) (Mild)  DENIES: Latex, Shellfish, X-Ray Dye, Iodine.      Medications: CEFTIN 500 MG ORAL TABS (CEFUROXIME AXETIL) 1 po BID  LORAZEPAM 0.5 MG ORAL TABS (LORAZEPAM) daily  ADVAIR -21 MCG/ACT INH AERO (FLUTICASONE-SALMETEROL) twice daily  DEXAMETHASONE 0.5 MG ORAL TABS (DEXAMETHASONE) daily  FEXOFENADINE  MG ORAL TABS (FEXOFENADINE HCL) one daily  KLOR-CON 10 10 MEQ ORAL CR-TABS (POTASSIUM CHLORIDE) two daily  LEVOTHYROXINE SODIUM 112 MCG ORAL TABS (LEVOTHYROXINE SODIUM) daily  LOVAZA 1 GM ORAL CAPS (OMEGA-3-ACID ETHYL ESTERS) two daily  NASONEX 50 MCG/ACT NASAL SUSP (MOMETASONE FUROATE) prn  ONDANSETRON HCL 8 MG ORAL TABS (ONDANSETRON HCL) as needed  SINGULAIR 10 MG ORAL TABS (MONTELUKAST SODIUM) daily  VALSARTAN-HYDROCHLOROTHIAZIDE 160-25 MG ORAL TABS (VALSARTAN-HYDROCHLOROTHIAZIDE) one daily  VICTOZA 18 MG/3ML SC SOPN (LIRAGLUTIDE) daily  LEVEMIR FLEXTOUCH 100 UNIT/ML SC SOPN (INSULIN DETEMIR) daily  NOVOLIN R SOLN (INSULIN REGULAR HUMAN SOLN) daily  LASIX 40 MG ORAL TABS (FUROSEMIDE) as needed    Problems: Renal calculus (ICD-592.0) (WDY36-S88.0)  Hx of urosepsis (ICD-V13.09) (GBO90-Z59.448)  UTI (ICD-599.0) (LVA97-K77.0)  Renal calculus (ICD-592.0) (CZG37-I69.0)  Ovarian cancer (ICD-183.0) (LOQ19-C58.9)  Urosepsis (ICD-599.0) (RUR42-O63.0)  Calculus of ureter (ICD-592.1) (NSP01-O71.1)    Illnesses: Recurrent ovarian cancer on chemotherapy, Diabetes, High Blood Pressure, Kidney Problems, and Cancer. DENIES: Heart Disease, Pacemaker/Defibrillator, Lung Disease, Bowel Problems, Stroke/Seizure, Bleeding Problems, HIV, or Hepatitis. Surgeries: 1/15: Cystoscopy, bilateral retrograde pyelograms, left ureterorenoscopy. No stone found, , and Hysterectomy. Family History: Kidney Stones and Ovarian cancer. DENIES: Kidney cancer, Kidney disease, Breast cancer, Uterine cancer, Cervical cancer. Social History: Retired. . Smoking status: never smoker. Does not drink alcohol. System Review: Admits to: Leg Swelling, Shortness of Breath, Constipation, Dry Skin, Difficulty Walking, and Easy Bleeding. DENIES: Unexplained Weight Loss, Dry Eyes, Dry Mouth, Involuntary Urine Loss, Lower Extremity Weakness, Psychiatric Problems, Impaired Sex Drive, Rash. URINALYSIS from Voided specimen  Urine Dip: pH: 6.0, Bld: Neg, LE: Neg, Nit: Neg, Prot: Neg, Ket: Neg, Gluc: Neg  Urine Micro: WBC: 0, RBC: 0, Bacteria: 0  Comment: epis    IMPRESSION:    1. OVARIAN CANCER (ICD-183.0) - Deteriorated  Per Dr. Chico Loya. 2. RENAL CALCULUS (ICD-592.0) - Unchanged  With question of hydronephrosis and renal insufficiency and plans for further chemotherapy, aggressive evaluation and treatment warranted and accepted.  Will perform cystoscopy, bilateral retrograde pyelograms, left ureterorenoscopy with laser lithotripsy and possible stent if necessary. We will evaluate the right as well. Increased risk due to metastatic cancer, obesity noted  3. UTI (ICD-599.0) - Unchanged  recent antibiotics, culture most recently demonstrates yeast    4.  HX OF UROSEPSIS (ICD-V13.09)        cc: MD Ryley Brown M.D. n/a

## 2018-03-27 DIAGNOSIS — E11.65 TYPE 2 DIABETES MELLITUS WITH HYPERGLYCEMIA, WITH LONG-TERM CURRENT USE OF INSULIN (HCC): ICD-10-CM

## 2018-03-27 DIAGNOSIS — Z79.4 TYPE 2 DIABETES MELLITUS WITH HYPERGLYCEMIA, WITH LONG-TERM CURRENT USE OF INSULIN (HCC): ICD-10-CM

## 2018-03-28 ENCOUNTER — OFFICE VISIT (OUTPATIENT)
Dept: CARDIOLOGY CLINIC | Age: 58
End: 2018-03-28

## 2018-03-28 NOTE — MR AVS SNAPSHOT
[facility] 
 
 
 [address] [place] 
[phone] Patient: Seamus Evans MRN: [id number] :1960 Visit Information Date & Time Provider Department Dept. Phone Encounter #  
 3/28/2018  9:20 AM Arabella May MD [facility] [phone] [id number] Your Appointments 2018  9:15 AM  
ROUTINE CARE with Caridad León MD  
[facility] [address] [facility]) Appt Note: 4 month follow up  
 [facility] [address] [address]  
[address] [place] [address] [address] [place] [address] Upcoming Health Maintenance Date Due Hepatitis C Screening 1960 DTaP/Tdap/Td series (1 - Tdap) 1982 COLONOSCOPY 10/14/2016 PAP AKA CERVICAL CYTOLOGY 2017 EYE EXAM RETINAL OR DILATED Q1 11/15/2017 MICROALBUMIN Q1 2018 LIPID PANEL Q1 2018 HEMOGLOBIN A1C Q6M 2018 MEDICARE YEARLY EXAM 8/3/2018 BREAST CANCER SCRN MAMMOGRAM 2018 Pneumococcal 19-64 Highest Risk (2 of 3 - PCV13) 10/24/2018 FOOT EXAM Q1 2018 Allergies as of 3/28/2018  Review Complete On: 2018 By: Ofe Hair Severity Noted Reaction Type Reactions Iodinated Contrast- Oral And Iv Dye High 2017   Topical Rash 13 hr pre-medication prior to IV contrast  
 Carboplatin  2014    Other (comments) Patient developed shortness of breath, felt faint, coughing, skin flushed and \"itchy\". This occurred on the patients 8th treatment. Lipitor [Atorvastatin]  2014    Myalgia Percocet [Oxycodone-acetaminophen]  2012   Not Verified Other (comments) fever Shellfish Containing Products  10/04/2012    Hives Tape [Adhesive]  2017    Itching, Other (comments) \"op site-- clear,thin tape\"--caused blister Tomato  2017    Hives Current Immunizations  Reviewed on 8/29/2017 Name Date Influenza Vaccine (Quad) PF 12/13/2016 Not reviewed this visit Vitals LMP OB Status Smoking Status 09/07/2011 Hysterectomy Never Smoker Your Updated Medication List  
  
   
This list is accurate as of 3/28/18  9:27 AM.  Always use your most recent med list.  
  
  
  
  
 * albuterol 90 mcg/actuation inhaler Commonly known as:  PROVENTIL HFA, VENTOLIN HFA, PROAIR HFA Take 2 Puffs by inhalation every four (4) hours as needed for Wheezing. * albuterol 2.5 mg /3 mL (0.083 %) nebulizer solution Commonly known as:  PROVENTIL VENTOLIN  
USE 1 VAIL VIA NEBULIZER EVERY 4 HOURS AS NEEDED FOR WHEEZING  
  
 ALLEGRA 180 mg tablet Generic drug:  fexofenadine Take 180 mg by mouth daily. aspirin delayed-release 81 mg tablet Take  by mouth daily. BD INSULIN PEN NEEDLE UF SHORT 31 gauge x 5/16\" Ndle Generic drug:  Insulin Needles (Disposable) USE WITH INSULIN TO INJECT FIVE TIMES DAILY. cholecalciferol (VITAMIN D3) 5,000 unit Tab tablet Commonly known as:  VITAMIN D3 Take 5,000 Units by mouth daily. clonazePAM 0.5 mg tablet Commonly known as:  Adaline Apley Take 0.5 mg by mouth nightly as needed. Dexlansoprazole 60 mg Cpdb Commonly known as:  DEXILANT Take 1 Cap by mouth daily. diphenhydrAMINE 25 mg capsule Commonly known as:  BENADRYL Take 25 mg by mouth as needed. * fluticasone 50 mcg/actuation nasal spray Commonly known as:  Ninoska Golds 2 Sprays by Both Nostrils route daily. * fluticasone 50 mcg/actuation nasal spray Commonly known as:  FLONASE  
2 SPRAYS IN BOTH NOSTRILS DAILY  
  
 furosemide 40 mg tablet Commonly known as:  LASIX Take 80 mg by mouth as needed. * HYDROcodone-acetaminophen 5-325 mg per tablet Commonly known as:  Judith  Take 1 Tab by mouth every four (4) hours as needed for Pain. Max Daily Amount: 6 Tabs. * HYDROcodone-acetaminophen 5-325 mg per tablet Commonly known as:  Sharri Records Take 1 Tab by mouth every four (4) hours as needed for Pain. Max Daily Amount: 6 Tabs. insulin aspart U-100 100 unit/mL Inpn Commonly known as:  NovoLOG Flexpen U-100 Insulin INJECT 20 UNITS BEFORE EACH MEAL + 4 UNITS FOR EVERY 50 MG/DL ABOVE 150 MG/DL.  UNITS PER DAY  
  
 insulin detemir U-100 100 unit/mL (3 mL) Inpn Commonly known as:  LEVEMIR FLEXTOUCH U-100 INSULN INJECT 30 UNITS IN AM AND 50 UNITS AT NIGHT. INCREASE AS DIRECTED TO  UNITS PER DAY--CALL [phone] FOR APPOINTMENT FOR MORE REFILLS  
  
 levothyroxine 150 mcg tablet Commonly known as:  SYNTHROID  
TAKE 1 TAB BY MOUTH DAILY (BEFORE BREAKFAST). --BAOT [phone] FOR APPOINTMENT FOR MORE REFILLS Liraglutide 0.6 mg/0.1 mL (18 mg/3 mL) Pnij Commonly known as:  VICTOZA  
1.8 mg by SubCUTAneous route daily (with lunch). montelukast 10 mg tablet Commonly known as:  SINGULAIR  
TAKE 1 TAB BY MOUTH DAILY. nystatin powder Commonly known as:  MYCOSTATIN Apply  to affected area four (4) times daily as needed. omega-3 acid ethyl esters 1 gram capsule Commonly known as:  Murvin Marine TAKE ONE CAPSULE BY MOUTH TWICE A DAY  
  
 ondansetron 8 mg disintegrating tablet Commonly known as:  ZOFRAN ODT Take 1 Tab by mouth every eight (8) hours as needed for Nausea. OTHER(NON-FORMULARY) 2 Tabs daily (after dinner). Eye Promise (vitamin) potassium chloride 10 mEq tablet Commonly known as:  KLOR-CON M10  
TAKE 1 TAB BY MOUTH TWO (2) DAYS A WEEK. WED AND FRI  
  
 RESTASIS 0.05 % ophthalmic emulsion Generic drug:  cycloSPORINE  
1 Drop two (2) times a day. sertraline 50 mg tablet Commonly known as:  ZOLOFT  
TAKE 1 TABLET BY MOUTH EVERY MORNING  
  
 SYMBICORT 160-4.5 mcg/actuation Hfaa Generic drug:  budesonide-formoterol Take 2 Puffs by inhalation two (2) times daily as needed. valsartan 80 mg tablet Commonly known as:  DIOVAN Take 1 Tab by mouth daily. * Notice: This list has 6 medication(s) that are the same as other medications prescribed for you. Read the directions carefully, and ask your doctor or other care provider to review them with you. Introducing [facility] & HEALTH SERVICES! Dear David Baxter: Thank you for requesting a RackWare account. Our records indicate that you already have an active RackWare account. You can access your account anytime at [email or web address] Did you know that you can access your hospital and ER discharge instructions at any time in RackWare? You can also review all of your test results from your hospital stay or ER visit. Additional Information If you have questions, please visit the Frequently Asked Questions section of the RackWare website at [email or web address] Remember, RackWare is NOT to be used for urgent needs. For medical emergencies, dial 911. Now available from your iPhone and Android! Please provide this summary of care documentation to your next provider. Your primary care clinician is listed as [place]. If you have any questions after today's visit, please call [phone].

## 2018-03-29 DIAGNOSIS — B19.20 HEPATITIS C VIRUS INFECTION WITHOUT HEPATIC COMA, UNSPECIFIED CHRONICITY: ICD-10-CM

## 2018-03-29 DIAGNOSIS — R76.8 ABNORMAL HEPATITIS SEROLOGY: Primary | ICD-10-CM

## 2018-03-29 LAB
ALBUMIN SERPL-MCNC: 4.1 G/DL (ref 3.5–5.5)
ALBUMIN SERPL-MCNC: 4.1 G/DL (ref 3.5–5.5)
ALBUMIN/CREAT UR: 209.6 MG/G CREAT (ref 0–30)
ALBUMIN/GLOB SERPL: 0.9 {RATIO} (ref 1.2–2.2)
ALBUMIN/GLOB SERPL: 1 {RATIO} (ref 1.2–2.2)
ALP SERPL-CCNC: 77 IU/L (ref 39–117)
ALP SERPL-CCNC: 78 IU/L (ref 39–117)
ALT SERPL-CCNC: 10 IU/L (ref 0–32)
ALT SERPL-CCNC: 13 IU/L (ref 0–32)
AST SERPL-CCNC: 29 IU/L (ref 0–40)
AST SERPL-CCNC: 29 IU/L (ref 0–40)
BASOPHILS # BLD AUTO: 0 X10E3/UL (ref 0–0.2)
BASOPHILS NFR BLD AUTO: 0 %
BILIRUB SERPL-MCNC: 0.4 MG/DL (ref 0–1.2)
BILIRUB SERPL-MCNC: 0.4 MG/DL (ref 0–1.2)
BUN SERPL-MCNC: 28 MG/DL (ref 6–24)
BUN SERPL-MCNC: 29 MG/DL (ref 6–24)
BUN/CREAT SERPL: 23 (ref 9–23)
BUN/CREAT SERPL: 24 (ref 9–23)
CALCIUM SERPL-MCNC: 9.4 MG/DL (ref 8.7–10.2)
CALCIUM SERPL-MCNC: 9.4 MG/DL (ref 8.7–10.2)
CANCER AG125 SERPL-ACNC: 271.7 U/ML (ref 0–38.1)
CHLORIDE SERPL-SCNC: 103 MMOL/L (ref 96–106)
CHLORIDE SERPL-SCNC: 103 MMOL/L (ref 96–106)
CHOLEST SERPL-MCNC: 216 MG/DL (ref 100–199)
CHOLEST SERPL-MCNC: 219 MG/DL (ref 100–199)
CO2 SERPL-SCNC: 20 MMOL/L (ref 18–29)
CO2 SERPL-SCNC: 23 MMOL/L (ref 18–29)
CREAT SERPL-MCNC: 1.21 MG/DL (ref 0.57–1)
CREAT SERPL-MCNC: 1.22 MG/DL (ref 0.57–1)
CREAT UR-MCNC: 101.3 MG/DL
EOSINOPHIL # BLD AUTO: 0.3 X10E3/UL (ref 0–0.4)
EOSINOPHIL NFR BLD AUTO: 4 %
ERYTHROCYTE [DISTWIDTH] IN BLOOD BY AUTOMATED COUNT: 13.9 % (ref 12.3–15.4)
EST. AVERAGE GLUCOSE BLD GHB EST-MCNC: 117 MG/DL
EST. AVERAGE GLUCOSE BLD GHB EST-MCNC: 123 MG/DL
GFR SERPLBLD CREATININE-BSD FMLA CKD-EPI: 50 ML/MIN/1.73
GFR SERPLBLD CREATININE-BSD FMLA CKD-EPI: 50 ML/MIN/1.73
GFR SERPLBLD CREATININE-BSD FMLA CKD-EPI: 57 ML/MIN/1.73
GFR SERPLBLD CREATININE-BSD FMLA CKD-EPI: 58 ML/MIN/1.73
GLOBULIN SER CALC-MCNC: 4.2 G/DL (ref 1.5–4.5)
GLOBULIN SER CALC-MCNC: 4.5 G/DL (ref 1.5–4.5)
GLUCOSE SERPL-MCNC: 121 MG/DL (ref 65–99)
GLUCOSE SERPL-MCNC: 122 MG/DL (ref 65–99)
HBA1C MFR BLD: 5.7 % (ref 4.8–5.6)
HBA1C MFR BLD: 5.9 % (ref 4.8–5.6)
HCT VFR BLD AUTO: 30.2 % (ref 34–46.6)
HCV AB S/CO SERPL IA: 1.9 S/CO RATIO (ref 0–0.9)
HDLC SERPL-MCNC: 38 MG/DL
HDLC SERPL-MCNC: 39 MG/DL
HGB BLD-MCNC: 9.8 G/DL (ref 11.1–15.9)
IMM GRANULOCYTES # BLD: 0 X10E3/UL (ref 0–0.1)
IMM GRANULOCYTES NFR BLD: 0 %
INTERPRETATION, 910389: NORMAL
INTERPRETATION: NORMAL
LDLC SERPL CALC-MCNC: 142 MG/DL (ref 0–99)
LDLC SERPL CALC-MCNC: 148 MG/DL (ref 0–99)
LYMPHOCYTES # BLD AUTO: 2.3 X10E3/UL (ref 0.7–3.1)
LYMPHOCYTES NFR BLD AUTO: 27 %
Lab: NORMAL
MCH RBC QN AUTO: 28.1 PG (ref 26.6–33)
MCHC RBC AUTO-ENTMCNC: 32.5 G/DL (ref 31.5–35.7)
MCV RBC AUTO: 87 FL (ref 79–97)
MICROALBUMIN UR-MCNC: 212.3 UG/ML
MONOCYTES # BLD AUTO: 0.7 X10E3/UL (ref 0.1–0.9)
MONOCYTES NFR BLD AUTO: 8 %
NEUTROPHILS # BLD AUTO: 5 X10E3/UL (ref 1.4–7)
NEUTROPHILS NFR BLD AUTO: 61 %
PDF IMAGE, 910387: NORMAL
PLATELET # BLD AUTO: 212 X10E3/UL (ref 150–379)
POTASSIUM SERPL-SCNC: 4.9 MMOL/L (ref 3.5–5.2)
POTASSIUM SERPL-SCNC: 4.9 MMOL/L (ref 3.5–5.2)
PROT SERPL-MCNC: 8.3 G/DL (ref 6–8.5)
PROT SERPL-MCNC: 8.6 G/DL (ref 6–8.5)
RBC # BLD AUTO: 3.49 X10E6/UL (ref 3.77–5.28)
SODIUM SERPL-SCNC: 140 MMOL/L (ref 134–144)
SODIUM SERPL-SCNC: 141 MMOL/L (ref 134–144)
T4 FREE SERPL-MCNC: 1.08 NG/DL (ref 0.82–1.77)
TRIGL SERPL-MCNC: 167 MG/DL (ref 0–149)
TRIGL SERPL-MCNC: 173 MG/DL (ref 0–149)
TSH SERPL DL<=0.005 MIU/L-ACNC: 1.33 UIU/ML (ref 0.45–4.5)
TSH SERPL DL<=0.005 MIU/L-ACNC: 1.35 UIU/ML (ref 0.45–4.5)
VLDLC SERPL CALC-MCNC: 33 MG/DL (ref 5–40)
VLDLC SERPL CALC-MCNC: 35 MG/DL (ref 5–40)
WBC # BLD AUTO: 8.3 X10E3/UL (ref 3.4–10.8)

## 2018-03-30 ENCOUNTER — TELEPHONE (OUTPATIENT)
Dept: INTERNAL MEDICINE CLINIC | Age: 58
End: 2018-03-30

## 2018-03-30 DIAGNOSIS — E03.9 ACQUIRED HYPOTHYROIDISM: ICD-10-CM

## 2018-03-30 RX ORDER — LEVOTHYROXINE SODIUM 150 UG/1
TABLET ORAL
Qty: 90 TAB | Refills: 0 | Status: SHIPPED | OUTPATIENT
Start: 2018-03-30 | End: 2019-01-03 | Stop reason: SDUPTHER

## 2018-03-30 NOTE — TELEPHONE ENCOUNTER
----- Message from Shama Rico MD sent at 3/29/2018 12:17 PM EDT -----  Let's recheck a couple labs. Ordered.  BJF

## 2018-04-10 ENCOUNTER — OFFICE VISIT (OUTPATIENT)
Dept: URGENT CARE | Age: 58
End: 2018-04-10

## 2018-04-10 ENCOUNTER — APPOINTMENT (OUTPATIENT)
Dept: CT IMAGING | Age: 58
End: 2018-04-10
Attending: EMERGENCY MEDICINE
Payer: MEDICARE

## 2018-04-10 ENCOUNTER — HOSPITAL ENCOUNTER (OUTPATIENT)
Age: 58
Setting detail: OBSERVATION
LOS: 1 days | Discharge: HOME | End: 2018-04-11
Attending: EMERGENCY MEDICINE | Admitting: INTERNAL MEDICINE
Payer: MEDICARE

## 2018-04-10 VITALS
RESPIRATION RATE: 20 BRPM | TEMPERATURE: 98.2 F | WEIGHT: 293 LBS | OXYGEN SATURATION: 95 % | DIASTOLIC BLOOD PRESSURE: 70 MMHG | SYSTOLIC BLOOD PRESSURE: 146 MMHG | HEIGHT: 69 IN | BODY MASS INDEX: 43.4 KG/M2 | HEART RATE: 88 BPM

## 2018-04-10 DIAGNOSIS — R06.02 SOB (SHORTNESS OF BREATH): ICD-10-CM

## 2018-04-10 DIAGNOSIS — R22.2 LOCALIZED SWELLING OF CHEST WALL: Primary | ICD-10-CM

## 2018-04-10 DIAGNOSIS — R07.9 RIGHT-SIDED CHEST PAIN: ICD-10-CM

## 2018-04-10 DIAGNOSIS — R22.2 MASS OF CHEST WALL, RIGHT: Primary | ICD-10-CM

## 2018-04-10 DIAGNOSIS — R63.4 WEIGHT LOSS: ICD-10-CM

## 2018-04-10 DIAGNOSIS — Z85.43 H/O OVARIAN CANCER: ICD-10-CM

## 2018-04-10 DIAGNOSIS — R07.89 CHEST PAIN, ATYPICAL: ICD-10-CM

## 2018-04-10 LAB
ALBUMIN SERPL-MCNC: 3.4 G/DL (ref 3.5–5)
ALBUMIN/GLOB SERPL: 0.6 {RATIO} (ref 1.1–2.2)
ALP SERPL-CCNC: 88 U/L (ref 45–117)
ALT SERPL-CCNC: 18 U/L (ref 12–78)
ANION GAP SERPL CALC-SCNC: 8 MMOL/L (ref 5–15)
AST SERPL-CCNC: 46 U/L (ref 15–37)
BASOPHILS # BLD: 0 K/UL (ref 0–0.1)
BASOPHILS NFR BLD: 1 % (ref 0–1)
BILIRUB SERPL-MCNC: 0.3 MG/DL (ref 0.2–1)
BUN SERPL-MCNC: 24 MG/DL (ref 6–20)
BUN/CREAT SERPL: 19 (ref 12–20)
CALCIUM SERPL-MCNC: 9 MG/DL (ref 8.5–10.1)
CHLORIDE SERPL-SCNC: 110 MMOL/L (ref 97–108)
CK MB CFR SERPL CALC: 0.3 % (ref 0–2.5)
CK MB SERPL-MCNC: 1.2 NG/ML (ref 5–25)
CK SERPL-CCNC: 367 U/L (ref 26–192)
CO2 SERPL-SCNC: 24 MMOL/L (ref 21–32)
CREAT SERPL-MCNC: 1.29 MG/DL (ref 0.55–1.02)
DIFFERENTIAL METHOD BLD: ABNORMAL
EOSINOPHIL # BLD: 0.3 K/UL (ref 0–0.4)
EOSINOPHIL NFR BLD: 4 % (ref 0–7)
ERYTHROCYTE [DISTWIDTH] IN BLOOD BY AUTOMATED COUNT: 12.8 % (ref 11.5–14.5)
GLOBULIN SER CALC-MCNC: 5.8 G/DL (ref 2–4)
GLUCOSE SERPL-MCNC: 134 MG/DL (ref 65–100)
HCT VFR BLD AUTO: 29.8 % (ref 35–47)
HGB BLD-MCNC: 9.3 G/DL (ref 11.5–16)
IMM GRANULOCYTES # BLD: 0 K/UL (ref 0–0.04)
IMM GRANULOCYTES NFR BLD AUTO: 0 % (ref 0–0.5)
LYMPHOCYTES # BLD: 1.9 K/UL (ref 0.8–3.5)
LYMPHOCYTES NFR BLD: 23 % (ref 12–49)
MCH RBC QN AUTO: 28.9 PG (ref 26–34)
MCHC RBC AUTO-ENTMCNC: 31.2 G/DL (ref 30–36.5)
MCV RBC AUTO: 92.5 FL (ref 80–99)
MONOCYTES # BLD: 0.8 K/UL (ref 0–1)
MONOCYTES NFR BLD: 10 % (ref 5–13)
NEUTS SEG # BLD: 4.9 K/UL (ref 1.8–8)
NEUTS SEG NFR BLD: 62 % (ref 32–75)
NRBC # BLD: 0 K/UL (ref 0–0.01)
NRBC BLD-RTO: 0 PER 100 WBC
PLATELET # BLD AUTO: 208 K/UL (ref 150–400)
PMV BLD AUTO: 10 FL (ref 8.9–12.9)
POTASSIUM SERPL-SCNC: 4.6 MMOL/L (ref 3.5–5.1)
PROT SERPL-MCNC: 9.2 G/DL (ref 6.4–8.2)
RBC # BLD AUTO: 3.22 M/UL (ref 3.8–5.2)
SODIUM SERPL-SCNC: 142 MMOL/L (ref 136–145)
TROPONIN I SERPL-MCNC: <0.04 NG/ML
WBC # BLD AUTO: 7.9 K/UL (ref 3.6–11)

## 2018-04-10 PROCEDURE — 99218 HC RM OBSERVATION: CPT

## 2018-04-10 PROCEDURE — 80053 COMPREHEN METABOLIC PANEL: CPT

## 2018-04-10 PROCEDURE — 36415 COLL VENOUS BLD VENIPUNCTURE: CPT

## 2018-04-10 PROCEDURE — 93005 ELECTROCARDIOGRAM TRACING: CPT

## 2018-04-10 PROCEDURE — 84484 ASSAY OF TROPONIN QUANT: CPT

## 2018-04-10 PROCEDURE — 94761 N-INVAS EAR/PLS OXIMETRY MLT: CPT

## 2018-04-10 PROCEDURE — 74011250637 HC RX REV CODE- 250/637: Performed by: EMERGENCY MEDICINE

## 2018-04-10 PROCEDURE — 71250 CT THORAX DX C-: CPT

## 2018-04-10 PROCEDURE — 85025 COMPLETE CBC W/AUTO DIFF WBC: CPT

## 2018-04-10 PROCEDURE — 99285 EMERGENCY DEPT VISIT HI MDM: CPT

## 2018-04-10 PROCEDURE — 82553 CREATINE MB FRACTION: CPT

## 2018-04-10 PROCEDURE — 82550 ASSAY OF CK (CPK): CPT

## 2018-04-10 PROCEDURE — 74011250637 HC RX REV CODE- 250/637: Performed by: INTERNAL MEDICINE

## 2018-04-10 RX ORDER — BISACODYL 5 MG
5 TABLET, DELAYED RELEASE (ENTERIC COATED) ORAL DAILY PRN
Status: DISCONTINUED | OUTPATIENT
Start: 2018-04-10 | End: 2018-04-12 | Stop reason: HOSPADM

## 2018-04-10 RX ORDER — ACETAMINOPHEN 325 MG/1
650 TABLET ORAL ONCE
Status: COMPLETED | OUTPATIENT
Start: 2018-04-10 | End: 2018-04-10

## 2018-04-10 RX ORDER — CLONAZEPAM 0.5 MG/1
0.5 TABLET ORAL
Status: DISCONTINUED | OUTPATIENT
Start: 2018-04-10 | End: 2018-04-12 | Stop reason: HOSPADM

## 2018-04-10 RX ORDER — DEXTROSE 50 % IN WATER (D50W) INTRAVENOUS SYRINGE
12.5-25 AS NEEDED
Status: DISCONTINUED | OUTPATIENT
Start: 2018-04-10 | End: 2018-04-12 | Stop reason: HOSPADM

## 2018-04-10 RX ORDER — HYDROCODONE BITARTRATE AND ACETAMINOPHEN 5; 325 MG/1; MG/1
1 TABLET ORAL
Status: DISCONTINUED | OUTPATIENT
Start: 2018-04-10 | End: 2018-04-12 | Stop reason: HOSPADM

## 2018-04-10 RX ORDER — SODIUM CHLORIDE 0.9 % (FLUSH) 0.9 %
5-10 SYRINGE (ML) INJECTION AS NEEDED
Status: DISCONTINUED | OUTPATIENT
Start: 2018-04-10 | End: 2018-04-12 | Stop reason: HOSPADM

## 2018-04-10 RX ORDER — PANTOPRAZOLE SODIUM 40 MG/1
40 TABLET, DELAYED RELEASE ORAL
Status: DISCONTINUED | OUTPATIENT
Start: 2018-04-11 | End: 2018-04-12 | Stop reason: HOSPADM

## 2018-04-10 RX ORDER — VALSARTAN 80 MG/1
80 TABLET ORAL DAILY
Status: DISCONTINUED | OUTPATIENT
Start: 2018-04-11 | End: 2018-04-12 | Stop reason: HOSPADM

## 2018-04-10 RX ORDER — INSULIN GLARGINE 100 [IU]/ML
20 INJECTION, SOLUTION SUBCUTANEOUS
Status: DISCONTINUED | OUTPATIENT
Start: 2018-04-11 | End: 2018-04-12 | Stop reason: HOSPADM

## 2018-04-10 RX ORDER — CYCLOSPORINE 0.5 MG/ML
1 EMULSION OPHTHALMIC 2 TIMES DAILY
Status: DISCONTINUED | OUTPATIENT
Start: 2018-04-10 | End: 2018-04-12 | Stop reason: HOSPADM

## 2018-04-10 RX ORDER — LORATADINE 10 MG/1
10 TABLET ORAL DAILY
Status: DISCONTINUED | OUTPATIENT
Start: 2018-04-11 | End: 2018-04-12 | Stop reason: HOSPADM

## 2018-04-10 RX ORDER — ONDANSETRON 2 MG/ML
4 INJECTION INTRAMUSCULAR; INTRAVENOUS
Status: DISCONTINUED | OUTPATIENT
Start: 2018-04-10 | End: 2018-04-12 | Stop reason: HOSPADM

## 2018-04-10 RX ORDER — LEVOTHYROXINE SODIUM 150 UG/1
150 TABLET ORAL
Status: DISCONTINUED | OUTPATIENT
Start: 2018-04-11 | End: 2018-04-12 | Stop reason: HOSPADM

## 2018-04-10 RX ORDER — SERTRALINE HYDROCHLORIDE 50 MG/1
50 TABLET, FILM COATED ORAL DAILY
Status: DISCONTINUED | OUTPATIENT
Start: 2018-04-11 | End: 2018-04-12 | Stop reason: HOSPADM

## 2018-04-10 RX ORDER — FLUTICASONE PROPIONATE 50 MCG
2 SPRAY, SUSPENSION (ML) NASAL DAILY
Status: DISCONTINUED | OUTPATIENT
Start: 2018-04-11 | End: 2018-04-12 | Stop reason: HOSPADM

## 2018-04-10 RX ORDER — SODIUM CHLORIDE 0.9 % (FLUSH) 0.9 %
5-10 SYRINGE (ML) INJECTION EVERY 8 HOURS
Status: DISCONTINUED | OUTPATIENT
Start: 2018-04-10 | End: 2018-04-12 | Stop reason: HOSPADM

## 2018-04-10 RX ORDER — ACETAMINOPHEN 325 MG/1
650 TABLET ORAL
Status: DISCONTINUED | OUTPATIENT
Start: 2018-04-10 | End: 2018-04-12 | Stop reason: HOSPADM

## 2018-04-10 RX ORDER — ASPIRIN 81 MG/1
81 TABLET ORAL DAILY
Status: DISCONTINUED | OUTPATIENT
Start: 2018-04-11 | End: 2018-04-12 | Stop reason: HOSPADM

## 2018-04-10 RX ORDER — MONTELUKAST SODIUM 10 MG/1
10 TABLET ORAL DAILY
Status: DISCONTINUED | OUTPATIENT
Start: 2018-04-11 | End: 2018-04-12 | Stop reason: HOSPADM

## 2018-04-10 RX ORDER — INSULIN LISPRO 100 [IU]/ML
INJECTION, SOLUTION INTRAVENOUS; SUBCUTANEOUS
Status: DISCONTINUED | OUTPATIENT
Start: 2018-04-11 | End: 2018-04-12 | Stop reason: HOSPADM

## 2018-04-10 RX ORDER — ALBUTEROL SULFATE 0.83 MG/ML
2.5 SOLUTION RESPIRATORY (INHALATION)
Status: DISCONTINUED | OUTPATIENT
Start: 2018-04-10 | End: 2018-04-12 | Stop reason: HOSPADM

## 2018-04-10 RX ORDER — MAGNESIUM SULFATE 100 %
4 CRYSTALS MISCELLANEOUS AS NEEDED
Status: DISCONTINUED | OUTPATIENT
Start: 2018-04-10 | End: 2018-04-12 | Stop reason: HOSPADM

## 2018-04-10 RX ORDER — FLUTICASONE FUROATE AND VILANTEROL 100; 25 UG/1; UG/1
1 POWDER RESPIRATORY (INHALATION) DAILY
Status: DISCONTINUED | OUTPATIENT
Start: 2018-04-11 | End: 2018-04-12 | Stop reason: HOSPADM

## 2018-04-10 RX ADMIN — CLONAZEPAM 0.5 MG: 0.5 TABLET ORAL at 23:49

## 2018-04-10 RX ADMIN — HYDROCODONE BITARTRATE AND ACETAMINOPHEN 1 TABLET: 5; 325 TABLET ORAL at 23:48

## 2018-04-10 RX ADMIN — ACETAMINOPHEN 650 MG: 325 TABLET ORAL at 19:38

## 2018-04-10 RX ADMIN — Medication 10 ML: at 23:48

## 2018-04-10 NOTE — Clinical Note
Status[de-identified] Inpatient [101] Type of Bed: Medical [8] Inpatient Hospitalization Certified Necessary for the Following Reasons: 3. Patient receiving treatment that can only be provided in an inpatient setting (further clarification in H&P documentation) Admitting Diagnosis: Chest mass [255990] Admitting Physician: Sam Cruz, [address]ta [[id number]] Attending Physician: Aye Tripp [[id number]] Estimated Length of Stay: 3-4 Midnights Discharge Plan[de-identified] 2003 [facility]

## 2018-04-10 NOTE — PROGRESS NOTES
After being seen by provider pt advised to be evaluated in the emergency department.  Report called to [facility] by Dr Juancarlos Wright

## 2018-04-10 NOTE — PROGRESS NOTES
HPI Comments: Adama Collier with hx of ovarian ca, carcinomatosis s/p chemotherapy had her portacath taken out in 2018, IDDM,  Asthma, HTN, pulmonary HTN, hypothyroid presents with right upper chest wall pain and swelling for the past 3 days - reports 9/10 pain worse with movement, deep breathing. Denies fever, chills, SOB, dizziness. The history is provided by the patient. Past Medical History:   Diagnosis Date    Adverse effect of anesthesia     sleep apnea cpap machine useage     Anemia     Arthritis     DDD low back and knees    Asthma     uses albuterol prn only; none x 6 mos.   Never hospitalized    Autoimmune disease ([address]     FIBROMYALGIA    BRCA negative 2013    Calculus of kidney     Chronic kidney disease     kidney stones    Chronic pain     knee pain bilat    CINV (chemotherapy-induced nausea and vomiting) 2014    Diabetes ([address] Age 46    IDDM    Environmental allergies     Fibromyalgia     GERD (gastroesophageal reflux disease)     controlled with med    Hypertension     Ill-defined condition     Sepsis  -  SOURCE PORT    Ill-defined condition 2016    chemotherapy     Morbid obesity ([address]     Murmur, heart     Other and unspecified hyperlipidemia     Ovarian cancer ([address] 2012, 2014    serous, high grade, stage IIIc. s/p chemotx (has port)    Psychiatric disorder     Rectal bleeding     Thromboembolus ([address]     POST PARTUM; resolved- PELVIS    Thyroid disease     HYPOTHYROID    Unspecified sleep apnea     CPAP, Dr. Jermaine Jeong        Past Surgical History:   Procedure Laterality Date    BREAST SURGERY PROCEDURE UNLISTED      Lymph node in left breast 2017    CHEST SURGERY PROCEDURE UNLISTED      Placement of port a cath right chest    HX GYN       X2    HX HYSTERECTOMY  2012    ROULA/BSO, tumor debulking, omentectomy for ovarian cancer    HX ORTHOPAEDIC      ankle-FX, R    HX ORTHOPAEDIC      FX R ARM    HX UROLOGICAL      stent    HX VASCULAR ACCESS  11/4/2015    right chest- port placed         Family History   Problem Relation Age of Onset    Hypertension Mother     Arthritis-osteo Mother     Lung Disease Father      COPD    Hypertension Father     Arthritis-osteo Father     Cancer Father      PROSTATE    Hypertension Brother     Elevated Lipids Brother     Arthritis-osteo Brother     Hypertension Brother     Elevated Lipids Brother     Obesity Brother     Cancer Brother      PROSTATE    Anesth Problems Son      DELAYED AWAKENING    Diabetes Maternal Grandmother         Social History     Social History    Marital status:      Spouse name: N/A    Number of children: N/A    Years of education: N/A     Occupational History    Not on file. Social History Main Topics    Smoking status: Never Smoker    Smokeless tobacco: Never Used    Alcohol use Yes      Comment: 2 DRINKS EVERY 2 MONTHS    Drug use: No    Sexual activity: Yes     Other Topics Concern    Not on file     Social History Narrative    Lives in [facility] [place] [facility] alone.  passed away in 5/16 of a heart attack. Has 2 sons. Disability. 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 Enjoys swimming and going to the gym. ALLERGIES: Iodinated contrast- oral and iv dye; Carboplatin; Lipitor [atorvastatin]; Percocet [oxycodone-acetaminophen]; Shellfish containing products; Tape [adhesive]; and Tomato    Review of Systems   Constitutional: Negative for chills and fever. Respiratory: Negative for shortness of breath and wheezing. Cardiovascular: Negative for chest pain and palpitations. Gastrointestinal: Negative for abdominal pain, nausea and vomiting. Skin:        Swelling right upper chest   Neurological: Negative for dizziness and headaches. Hematological: Negative for adenopathy.        Vitals:    04/10/18 1721   BP: 146/70   Pulse: 88   Resp: 20   Temp: 98.2 °F (36.8 °C)   SpO2: 95%   Weight: 316 lb (143.3 kg)   Height: 5' 9\" (1.753 m)       Physical Exam   Constitutional: She appears well-developed and well-nourished. No distress. Cardiovascular: Normal rate, regular rhythm and normal heart sounds. Pulmonary/Chest: Effort normal and breath sounds normal. No respiratory distress. She has no wheezes. She has no rales. She exhibits tenderness. Neurological: She is alert. Skin: She is not diaphoretic. Psychiatric: She has a normal mood and affect. Her behavior is normal. Judgment and thought content normal.   Nursing note and vitals reviewed. MDM    Procedures        ICD-10-CM ICD-9-CM    1. Localized swelling of chest wall R22.2 782.2      Severe pain - Referred to ER for further evaluation    Does not appear to be an abscess    Pt and pt's friend unable to drive to ER. Pt does not have anyone available to take her. Pt requests EMS transport.

## 2018-04-10 NOTE — MR AVS SNAPSHOT
[address] [place] [address] 
[phone] Patient: Russel Woo MRN: [id number] :1960 Visit Information Date & Time Provider Department Dept. Phone Encounter #  
 4/10/2018  5:15 PM [address] [facility] [phone] [id number] Your Appointments 2018 11:00 AM  
3 MONTH with Nikki Caceres MD  
[address] [facility]) Appt Note: 3 month check [address] [place] [address] [phone]  
[address] [place] 2018  9:15 AM  
ROUTINE CARE with Juice Barkley MD  
[facility]) Appt Note: 4 month follow up  
 [address] [facility] [address] [address]  
[address] [facility] [address] Upcoming Health Maintenance Date Due DTaP/Tdap/Td series (1 - Tdap) 1982 COLONOSCOPY 10/14/2016 PAP AKA CERVICAL CYTOLOGY 2017 EYE EXAM RETINAL OR DILATED Q1 11/15/2017 MEDICARE YEARLY EXAM 8/3/2018 BREAST CANCER SCRN MAMMOGRAM 2018 HEMOGLOBIN A1C Q6M 2018 Pneumococcal 19-64 Highest Risk (2 of 3 - PCV13) 10/24/2018 FOOT EXAM Q1 2018 MICROALBUMIN Q1 3/28/2019 LIPID PANEL Q1 3/28/2019 Allergies as of 4/10/2018  Review Complete On: 4/10/2018 By: Rojas Short RN Severity Noted Reaction Type Reactions Iodinated Contrast- Oral And Iv Dye High 2017   Topical Rash 13 hr pre-medication prior to IV contrast  
 Carboplatin  2014    Other (comments) Patient developed shortness of breath, felt faint, coughing, skin flushed and \"itchy\". This occurred on the patients 8th treatment. Lipitor [Atorvastatin]  2014    Myalgia Percocet [Oxycodone-acetaminophen]  2012   Not Verified Other (comments) fever Shellfish Containing Products  10/04/2012    Hives Tape [Adhesive]  03/16/2017    Itching, Other (comments) \"op site-- clear,thin tape\"--caused blister Tomato  12/05/2017    Hives Current Immunizations  Reviewed on 8/29/2017 Name Date Influenza Vaccine (Quad) PF 12/13/2016 Not reviewed this visit You Were Diagnosed With   
  
 Codes Comments Localized swelling of chest wall    -  Primary ICD-10-CM: R22.2 ICD-9-CM: 004. 2 Vitals BP Pulse Temp Resp Height(growth percentile) Weight(growth percentile) 146/70 88 98.2 °F (36.8 °C) 20 5' 9\" (1.753 m) 316 lb (143.3 kg) LMP SpO2 BMI OB Status Smoking Status 09/07/2011 95% 46.67 kg/m2 Hysterectomy Never Smoker BMI and BSA Data Body Mass Index Body Surface Area  
 46.67 kg/m 2 2.64 m 2 Preferred Pharmacy Pharmacy Name Phone [id number]- [place] [id number] [facility] [phone] Your Updated Medication List  
  
   
This list is accurate as of 4/10/18  5:38 PM.  Always use your most recent med list.  
  
  
  
  
 * albuterol 90 mcg/actuation inhaler Commonly known as:  PROVENTIL HFA, VENTOLIN HFA, PROAIR HFA Take 2 Puffs by inhalation every four (4) hours as needed for Wheezing. * albuterol 2.5 mg /3 mL (0.083 %) nebulizer solution Commonly known as:  PROVENTIL VENTOLIN  
USE 1 VAIL VIA NEBULIZER EVERY 4 HOURS AS NEEDED FOR WHEEZING  
  
 ALLEGRA 180 mg tablet Generic drug:  fexofenadine Take 180 mg by mouth daily. aspirin delayed-release 81 mg tablet Take  by mouth daily. BD INSULIN PEN NEEDLE UF SHORT 31 gauge x 5/16\" Ndle Generic drug:  Insulin Needles (Disposable) USE WITH INSULIN TO INJECT FIVE TIMES DAILY. cholecalciferol (VITAMIN D3) 5,000 unit Tab tablet Commonly known as:  VITAMIN D3 Take 5,000 Units by mouth daily. clonazePAM 0.5 mg tablet Commonly known as:  Merrilee Mortimer  
 Take 0.5 mg by mouth nightly as needed. Dexlansoprazole 60 mg Cpdb Commonly known as:  DEXILANT Take 1 Cap by mouth daily. diphenhydrAMINE 25 mg capsule Commonly known as:  BENADRYL Take 25 mg by mouth as needed. * fluticasone 50 mcg/actuation nasal spray Commonly known as:  Yumiko Villegas 2 Sprays by Both Nostrils route daily. * fluticasone 50 mcg/actuation nasal spray Commonly known as:  FLONASE  
2 SPRAYS IN BOTH NOSTRILS DAILY  
  
 furosemide 40 mg tablet Commonly known as:  LASIX Take 80 mg by mouth as needed. * HYDROcodone-acetaminophen 5-325 mg per tablet Commonly known as:  Bradly Figueroa Take 1 Tab by mouth every four (4) hours as needed for Pain. Max Daily Amount: 6 Tabs. * HYDROcodone-acetaminophen 5-325 mg per tablet Commonly known as:  Bradly Figueroa Take 1 Tab by mouth every four (4) hours as needed for Pain. Max Daily Amount: 6 Tabs. insulin aspart U-100 100 unit/mL Inpn Commonly known as:  NovoLOG Flexpen U-100 Insulin INJECT 20 UNITS BEFORE EACH MEAL + 4 UNITS FOR EVERY 50 MG/DL ABOVE 150 MG/DL.  UNITS PER DAY  
  
 insulin detemir U-100 100 unit/mL (3 mL) Inpn Commonly known as:  LEVEMIR FLEXTOUCH U-100 INSULN INJECT 30 UNITS IN AM AND 50 UNITS AT NIGHT. INCREASE AS DIRECTED TO  UNITS PER DAY--CALL [phone] FOR APPOINTMENT FOR MORE REFILLS  
  
 levothyroxine 150 mcg tablet Commonly known as:  SYNTHROID  
TAKE 1 TAB BY MOUTH DAILY (BEFORE BREAKFAST). --[facility] [phone] FOR APPOINTMENT FOR MORE REFILLS Liraglutide 0.6 mg/0.1 mL (18 mg/3 mL) Pnij Commonly known as:  VICTOZA  
1.8 mg by SubCUTAneous route daily (with lunch). montelukast 10 mg tablet Commonly known as:  SINGULAIR  
TAKE 1 TAB BY MOUTH DAILY. nystatin powder Commonly known as:  MYCOSTATIN Apply  to affected area four (4) times daily as needed. omega-3 acid ethyl esters 1 gram capsule Commonly known as:  Colette Pierce TAKE ONE CAPSULE BY MOUTH TWICE A DAY  
  
 ondansetron 8 mg disintegrating tablet Commonly known as:  ZOFRAN ODT Take 1 Tab by mouth every eight (8) hours as needed for Nausea. OTHER(NON-FORMULARY) 2 Tabs daily (after dinner). Eye Promise (vitamin) potassium chloride 10 mEq tablet Commonly known as:  KLOR-CON M10  
TAKE 1 TAB BY MOUTH TWO (2) DAYS A WEEK. WED AND FRI  
  
 RESTASIS 0.05 % ophthalmic emulsion Generic drug:  cycloSPORINE  
1 Drop two (2) times a day. sertraline 50 mg tablet Commonly known as:  ZOLOFT  
TAKE 1 TABLET BY MOUTH EVERY MORNING  
  
 SYMBICORT 160-4.5 mcg/actuation Hfaa Generic drug:  budesonide-formoterol Take 2 Puffs by inhalation two (2) times daily as needed. valsartan 80 mg tablet Commonly known as:  DIOVAN Take 1 Tab by mouth daily. * Notice: This list has 6 medication(s) that are the same as other medications prescribed for you. Read the directions carefully, and ask your doctor or other care provider to review them with you. Patient Instructions Referred to ER for further evaluation Introducing [facility]! Dear Nikko Pendleton: Thank you for requesting a BombBomb account. Our records indicate that you already have an active BombBomb account. You can access your account anytime at [email or web address] Did you know that you can access your hospital and ER discharge instructions at any time in BombBomb? You can also review all of your test results from your hospital stay or ER visit. Additional Information If you have questions, please visit the Frequently Asked Questions section of the BombBomb website at [email or web address] Remember, BombBomb is NOT to be used for urgent needs. For medical emergencies, dial 911. Now available from your iPhone and Android! Please provide this summary of care documentation to your next provider. Your primary care clinician is listed as [place]. If you have any questions after today's visit, please call [phone].

## 2018-04-10 NOTE — ED PROVIDER NOTES
EMERGENCY DEPARTMENT HISTORY AND PHYSICAL EXAM      Date: 4/10/2018  Patient Name: Elo Atkins     I have seen and evaluated this patient in the Express Care portion of triage for chest pain and swelling around port-a-cath site. The patients care will begin now and orders have been placed. This patient will be seen and provided further care in the Emergency Room. Written by Carlos De La Fuente, a scribe for Brady Clark PA-C. History of Presenting Illness     Chief Complaint   Patient presents with    Chest Pain     to triage by EMS in a wheelchair, SOB and nausea, started a few days ago, denies vomiting, had port a cath placed last year for cancer treatment       History Provided By: Patient    HPI: Elo Atkins, 65 y.o. female with PMHx significant for HTN, asthma, fibromyalgia, CKD, DM, PE, presents via EMS to the ED with cc of gradually worsening pain and swelling to the right chest wall at site of portacath x 2 days with associated intermittent SOB. Pt states her last chemotherapy treatment was in 4/2017 and chart review reveals she had the portcath removed 12/2017. She notes her PCP has been monitoring her for enlarged lymph nodes. Pt denies any recent injury or trauma to the chest wall that could contribute to her current sx's. She explains she was seen at [facility] and was referred to the ED for further evaluation. Pt also reports acute on chronic LLE swelling, for which she is currently on Lasix. She notes hx of postpartum PE, however pt is no longer on anticoagulation. Pt specifically denies any cough, nausea, vomiting, fever, and cough. PCP: Gorge Anders MD    There are no other complaints, changes, or physical findings at this time. Current Outpatient Prescriptions   Medication Sig Dispense Refill    levothyroxine (SYNTHROID) 150 mcg tablet TAKE 1 TAB BY MOUTH DAILY (BEFORE BREAKFAST). --CALL [phone] FOR APPOINTMENT FOR MORE REFILLS 90 Tab 0    Liraglutide (VICTOZA) 0.6 mg/0.1 mL (18 mg/3 mL) pnij 1.8 mg by SubCUTAneous route daily (with lunch). 3 Pen 3    insulin detemir (LEVEMIR FLEXTOUCH) 100 unit/mL (3 mL) inpn INJECT 30 UNITS IN AM AND 50 UNITS AT NIGHT. INCREASE AS DIRECTED TO  UNITS PER DAY--CALL [phone] FOR APPOINTMENT FOR MORE REFILLS 30 mL 3    insulin aspart (NOVOLOG FLEXPEN) 100 unit/mL inpn INJECT 20 UNITS BEFORE EACH MEAL + 4 UNITS FOR EVERY 50 MG/DL ABOVE 150 MG/DL.  UNITS PER DAY 30 mL 11    Dexlansoprazole (DEXILANT) 60 mg CpDB Take 1 Cap by mouth daily. 30 Cap 11    potassium chloride (KLOR-CON M10) 10 mEq tablet TAKE 1 TAB BY MOUTH TWO (2) DAYS A WEEK. WED AND FRI 90 Tab 1    fluticasone (FLONASE) 50 mcg/actuation nasal spray 2 SPRAYS IN BOTH NOSTRILS DAILY 2 Bottle 3    SYMBICORT 160-4.5 mcg/actuation HFAA Take 2 Puffs by inhalation two (2) times daily as needed. 2 Inhaler 3    valsartan (DIOVAN) 80 mg tablet Take 1 Tab by mouth daily. 30 Tab 5    HYDROcodone-acetaminophen (NORCO) 5-325 mg per tablet Take 1 Tab by mouth every four (4) hours as needed for Pain. Max Daily Amount: 6 Tabs. 12 Tab 0    montelukast (SINGULAIR) 10 mg tablet TAKE 1 TAB BY MOUTH DAILY. 90 Tab 1    omega-3 acid ethyl esters (LOVAZA) 1 gram capsule TAKE ONE CAPSULE BY MOUTH TWICE A  Cap 2    diphenhydrAMINE (BENADRYL) 25 mg capsule Take 25 mg by mouth as needed.  HYDROcodone-acetaminophen (NORCO) 5-325 mg per tablet Take 1 Tab by mouth every four (4) hours as needed for Pain. Max Daily Amount: 6 Tabs. 30 Tab 0    albuterol (PROVENTIL VENTOLIN) 2.5 mg /3 mL (0.083 %) nebulizer solution USE 1 VAIL VIA NEBULIZER EVERY 4 HOURS AS NEEDED FOR WHEEZING 3 Package 1    ondansetron (ZOFRAN ODT) 8 mg disintegrating tablet Take 1 Tab by mouth every eight (8) hours as needed for Nausea. 30 Tab 3    aspirin delayed-release 81 mg tablet Take  by mouth daily.       BD INSULIN PEN NEEDLE UF SHORT 31 gauge x 5/16\" ndle USE WITH INSULIN TO INJECT FIVE TIMES DAILY. 200 Pen Needle 11    fluticasone (FLONASE) 50 mcg/actuation nasal spray 2 Sprays by Both Nostrils route daily.  clonazePAM (KLONOPIN) 0.5 mg tablet Take 0.5 mg by mouth nightly as needed.  nystatin (MYCOSTATIN) powder Apply  to affected area four (4) times daily as needed.  OTHER,NON-FORMULARY, 2 Tabs daily (after dinner). [facility] (vitamin)      cycloSPORINE (RESTASIS) 0.05 % ophthalmic emulsion 1 Drop two (2) times a day.  sertraline (ZOLOFT) 50 mg tablet TAKE 1 TABLET BY MOUTH EVERY MORNING  1    cholecalciferol, VITAMIN D3, (VITAMIN D3) 5,000 unit tab tablet Take 5,000 Units by mouth daily.  furosemide (LASIX) 40 mg tablet Take 80 mg by mouth as needed. 3    albuterol (PROVENTIL HFA, VENTOLIN HFA, PROAIR HFA) 90 mcg/actuation inhaler Take 2 Puffs by inhalation every four (4) hours as needed for Wheezing.  fexofenadine (ALLEGRA) 180 mg tablet Take 180 mg by mouth daily. Past History     Past Medical History:  Past Medical History:   Diagnosis Date    Adverse effect of anesthesia     sleep apnea cpap machine useage     Anemia     Arthritis     DDD low back and knees    Asthma     uses albuterol prn only; none x 6 mos.   Never hospitalized    Autoimmune disease ([address]     FIBROMYALGIA    BRCA negative 1/2013    Calculus of kidney     Chronic kidney disease     kidney stones    Chronic pain     knee pain bilat    CINV (chemotherapy-induced nausea and vomiting) 8/21/2014    Diabetes ([address] Age 46    IDDM    Environmental allergies     Fibromyalgia     GERD (gastroesophageal reflux disease)     controlled with med    Hypertension     Ill-defined condition     Sepsis 9-2015 -  SOURCE PORT    Ill-defined condition 2016    chemotherapy     Morbid obesity ([address]     Murmur, heart     Other and unspecified hyperlipidemia     Ovarian cancer ([address] 9/2012, 1/2014    serous, high grade, stage IIIc. s/p chemotx (has port)    Psychiatric disorder     Rectal bleeding     Thromboembolus ([facility]     POST PARTUM; resolved- PELVIS    Thyroid disease     HYPOTHYROID    Unspecified sleep apnea     CPAP, Dr. Ismael Doshi       Past Surgical History:  Past Surgical History:   Procedure Laterality Date    BREAST SURGERY PROCEDURE UNLISTED      Lymph node in left breast 2017    CHEST SURGERY PROCEDURE UNLISTED      Placement of port a cath right chest    HX GYN       X2    HX HYSTERECTOMY  2012    ROULA/BSO, tumor debulking, omentectomy for ovarian cancer    HX ORTHOPAEDIC      ankle-FX, R    HX ORTHOPAEDIC      FX R ARM    HX UROLOGICAL      stent    HX VASCULAR ACCESS  2015    right chest- port placed       Family History:  Family History   Problem Relation Age of Onset    Hypertension Mother     Arthritis-osteo Mother     Lung Disease Father      COPD    Hypertension Father     Arthritis-osteo Father     Cancer Father      PROSTATE    Hypertension Brother     Elevated Lipids Brother     Arthritis-osteo Brother     Hypertension Brother     Elevated Lipids Brother     Obesity Brother     Cancer Brother      PROSTATE    Anesth Problems Son      DELAYED AWAKENING    Diabetes Maternal Grandmother        Social History:  Social History   Substance Use Topics    Smoking status: Never Smoker    Smokeless tobacco: Never Used    Alcohol use Yes      Comment: 2 DRINKS EVERY 2 MONTHS       Allergies: Allergies   Allergen Reactions    Iodinated Contrast- Oral And Iv Dye Rash     13 hr pre-medication prior to IV contrast    Carboplatin Other (comments)     Patient developed shortness of breath, felt faint, coughing, skin flushed and \"itchy\". This occurred on the patients 8th treatment.     Lipitor [Atorvastatin] Myalgia    Percocet [Oxycodone-Acetaminophen] Other (comments)     fever    Shellfish Containing Products Hives    Tape [Adhesive] Itching and Other (comments)     \"op site-- clear,thin tape\"--caused blister     Tomato Hives Review of Systems   Review of Systems   Constitutional: Negative for chills and fever. HENT: Negative for congestion, ear pain, rhinorrhea, sore throat and trouble swallowing. Eyes: Negative for visual disturbance. Respiratory: Positive for shortness of breath. Negative for cough and chest tightness. Cardiovascular: Positive for chest pain and leg swelling. Negative for palpitations. + swelling R chest wall   Gastrointestinal: Negative for abdominal pain, blood in stool, constipation, diarrhea, nausea and vomiting. Genitourinary: Negative for decreased urine volume, difficulty urinating, dysuria and frequency. Musculoskeletal: Negative for back pain and neck pain. Skin: Negative for color change and rash. Neurological: Negative for dizziness, weakness, light-headedness and headaches. Physical Exam   Physical Exam   Constitutional: She is oriented to person, place, and time. Vital signs are normal. She appears well-developed. She does not appear ill. Morbidly obese   HENT:   Mouth/Throat: Oropharynx is clear and moist.   Eyes: Conjunctivae are normal.   Neck: Neck supple. Cardiovascular: Normal rate and regular rhythm. Pulmonary/Chest: Effort normal and breath sounds normal. No accessory muscle usage. No respiratory distress. Area of swelling just to the right of the sternum adjacent to the scar of her portacath. Are is tender to palpation, but no warmth or redness. Abdominal: Soft. She exhibits no distension. There is no tenderness. Musculoskeletal:   LLE swollen compared to RLE (chronic per pt)    Lymphadenopathy:     She has no cervical adenopathy. Neurological: She is alert and oriented to person, place, and time. She has normal strength. No cranial nerve deficit or sensory deficit. Skin: Skin is warm and dry. Nursing note and vitals reviewed.         Diagnostic Study Results     Labs -     Recent Results (from the past 12 hour(s))   EKG, 12 LEAD, INITIAL Collection Time: 04/10/18  6:22 PM   Result Value Ref Range    Ventricular Rate 80 BPM    Atrial Rate 80 BPM    P-R Interval 158 ms    QRS Duration 80 ms    Q-T Interval 362 ms    QTC Calculation (Bezet) 417 ms    Calculated P Axis 65 degrees    Calculated R Axis -4 degrees    Calculated T Axis 26 degrees    Diagnosis       Normal sinus rhythm  Cannot rule out Anterior infarct , age undetermined  Abnormal ECG     CBC WITH AUTOMATED DIFF    Collection Time: 04/10/18  7:33 PM   Result Value Ref Range    WBC 7.9 3.6 - 11.0 K/uL    RBC 3.22 (L) 3.80 - 5.20 M/uL    HGB 9.3 (L) 11.5 - 16.0 g/dL    HCT 29.8 (L) 35.0 - 47.0 %    MCV 92.5 80.0 - 99.0 FL    MCH 28.9 26.0 - 34.0 PG    MCHC 31.2 30.0 - 36.5 g/dL    RDW 12.8 11.5 - 14.5 %    PLATELET 767 548 - 650 K/uL    MPV 10.0 8.9 - 12.9 FL    NRBC 0.0 0  WBC    ABSOLUTE NRBC 0.00 0.00 - 0.01 K/uL    NEUTROPHILS 62 32 - 75 %    LYMPHOCYTES 23 12 - 49 %    MONOCYTES 10 5 - 13 %    EOSINOPHILS 4 0 - 7 %    BASOPHILS 1 0 - 1 %    IMMATURE GRANULOCYTES 0 0.0 - 0.5 %    ABS. NEUTROPHILS 4.9 1.8 - 8.0 K/UL    ABS. LYMPHOCYTES 1.9 0.8 - 3.5 K/UL    ABS. MONOCYTES 0.8 0.0 - 1.0 K/UL    ABS. EOSINOPHILS 0.3 0.0 - 0.4 K/UL    ABS. BASOPHILS 0.0 0.0 - 0.1 K/UL    ABS. IMM. GRANS. 0.0 0.00 - 0.04 K/UL    DF AUTOMATED     METABOLIC PANEL, COMPREHENSIVE    Collection Time: 04/10/18  7:33 PM   Result Value Ref Range    Sodium 142 136 - 145 mmol/L    Potassium 4.6 3.5 - 5.1 mmol/L    Chloride 110 (H) 97 - 108 mmol/L    CO2 24 21 - 32 mmol/L    Anion gap 8 5 - 15 mmol/L    Glucose 134 (H) 65 - 100 mg/dL    BUN 24 (H) 6 - 20 MG/DL    Creatinine 1.29 (H) 0.55 - 1.02 MG/DL    BUN/Creatinine ratio 19 12 - 20      GFR est AA 52 (L) >60 ml/min/1.73m2    GFR est non-AA 43 (L) >60 ml/min/1.73m2    Calcium 9.0 8.5 - 10.1 MG/DL    Bilirubin, total 0.3 0.2 - 1.0 MG/DL    ALT (SGPT) 18 12 - 78 U/L    AST (SGOT) 46 (H) 15 - 37 U/L    Alk.  phosphatase 88 45 - 117 U/L    Protein, total 9.2 (H) 6.4 - 8.2 g/dL    Albumin 3.4 (L) 3.5 - 5.0 g/dL    Globulin 5.8 (H) 2.0 - 4.0 g/dL    A-G Ratio 0.6 (L) 1.1 - 2.2     TROPONIN I    Collection Time: 04/10/18  7:33 PM   Result Value Ref Range    Troponin-I, Qt. <0.04 <0.05 ng/mL   CK W/ REFLX CKMB    Collection Time: 04/10/18  7:33 PM   Result Value Ref Range     (H) 26 - 192 U/L   CK-MB,QUANT. Collection Time: 04/10/18  7:33 PM   Result Value Ref Range    CK - MB 1.2 <3.6 NG/ML    CK-MB Index 0.3 0 - 2.5         Radiologic Studies -     CT Results  (Last 48 hours)               04/10/18 1921  CT CHEST WO CONT Final result    Impression:  IMPRESSION:     1. There is a chest wall mass in the right parasternal region which invades the   sternum and is seen on both the internal internal as well as external sides of   the chest wall. This may account for the patient's symptoms of right-sided chest   pain. 2. There are patchy areas of airspace process bilaterally which may represent   areas of acute process in the to be correlated clinically. 3. Massive adenopathy in the mediastinum noted. 4. Increasing adenopathy in the retrocrural region and upper abdomen. .                                   Narrative:  EXAM:  CT CHEST WO CONT       INDICATION:  anterior chest wall swelling. History of metastatic disease from   ovarian cancer. History of chemotherapy. COMPARISON: None. CONTRAST:  None. TECHNIQUE: Unenhanced multislice helical CT was performed from the thoracic   inlet to the adrenal glands without intravenous contrast administration. Contiguous 5 mm axial images were reconstructed and lung and soft tissue windows   were generated. Coronal and sagittal reformations were generated. CT dose   reduction was achieved through use of a standardized protocol tailored for this   examination and automatic exposure control for dose modulation.        FINDINGS:   LUNGS: There is a rounded patchy area of airspace process in the periphery of   the superior segment of the right lower lobe as well as patchy areas along the   periphery just inferior to it. Similar finding noted in the periphery of the   lingula as well as more centrally in the lingula. These may be areas of acute   airspace process. Alissa Narendra PLEURA: There is a chest wall mass which lies along the right side of the   proximal body of the sternum which has pleural base and extends through the   chest wall as well as eroding the adjacent sternum. On axial imaging this mass   is 4.9 x 4.4 cm in size. This may account for the patient's right chest wall   symptoms. TRACHEA/BRONCHI: Patent        The absence of intravenous contrast reduces the sensitivity for evaluation of   the mediastinum and upper abdominal organs. MEDIASTINUM/SWATI: There is massive adenopathy in the superior mediastinum as   well as adenopathy which extends into the hilar regions bilaterally and in the   subcarinal region. Adenopathy is also identified in the pericardial lymph nodes   on the right. Largest of these measures 2.1 cm. There is also an enlarged lymph   node along the left posterior margin of the manubrium/body of the sternum which   measures 2.9 cm. .       AORTA:  The aorta has a normal contour with no evidence of aneurysm. UPPER ABDOMEN: On previous abdominal CT adenopathy was noted in the upper   abdomen. There are retrocrural lymph nodes as well the largest of which on the   left measures 1.4 x 3.7 cm. Previously this lymph node measured 0.8 x 3.1 cm. There is marked adenopathy in the celiac axis extending into the hepatic hilus. This also extends into the area of the falciform ligament. This is more   extensive than on the previous study. There is also perigastric adenopathy on   the left which is now measures approximately 5.3 x 7.4 cm. Previously this was   5.2 x 4.0 cm. .       BONES: As mentioned previously there is lytic erosion along the right side of   the proximal body of the sternum adjacent to the mass. There is a lucency in the   T4 vertebral body. Medical Decision Making   I am the first provider for this patient. I reviewed the vital signs, available nursing notes, past medical history, past surgical history, family history and social history. Vital Signs-Reviewed the patient's vital signs. Patient Vitals for the past 12 hrs:   Temp Pulse Resp BP SpO2   04/10/18 2115 - 70 15 140/80 96 %   04/10/18 2100 - 71 24 148/87 96 %   04/10/18 2045 - 67 21 156/88 97 %   04/10/18 2030 - 67 24 151/81 97 %   04/10/18 2015 - 72 22 146/79 96 %   04/10/18 1936 - 73 26 - 95 %   04/10/18 1935 - - - 144/78 -   04/10/18 1900 - 73 15 177/87 97 %   04/10/18 1856 - 74 25 - 95 %   04/10/18 1854 - - - 159/81 -   04/10/18 1818 98.1 °F (36.7 °C) 85 16 (!) 177/92 98 %    (36.7 °C) 85 16 (!) 177/92 98 %       Pulse Oximetry Analysis - 98% on RA    Cardiac Monitor:   Rate: 85 bpm  Rhythm: Normal Sinus Rhythm      EKG interpretation: (Preliminary)  1822  Rhythm: normal sinus rhythm; and regular . Rate (approx.): 80; Axis: normal; CT interval: 158; QRS interval: 80; ST/T wave: normal; Other findings. Written by Lila Rendon ED Scribe, as dictated by Julio Thomas MD.    Records Reviewed: Nursing Notes, Old Medical Records, Previous electrocardiograms, Ambulance Run Sheet, Previous Radiology Studies and Previous Laboratory Studies    Provider Notes (Medical Decision Making):     Pt presents with pain and swelling of the anterior chest adjacent to the site of where a portacath was removed 3-4 months ago. She does not have a fever, there is no redness or warmth to suggest infection. She is not on any blood thinners. Could be a hematoma, but she would be low risk for this. No reported injury to the chest wall. Due to contrast allergy we will have to obtain a non-contrast scan of the chest to look for fluid collection and masses. Very low suspicion for ACS or PE.     ED Course:   Initial assessment performed. The patients presenting problems have been discussed, and they are in agreement with the care plan formulated and outlined with them. I have encouraged them to ask questions as they arise throughout their visit. CONSULT NOTE:   Marina Myers MD spoke with Rkia Del Cid MD   Specialty: Hospitalist  Discussed pt's hx, disposition, and available diagnostic and imaging results. Reviewed care plans. Consultant will evaluate pt for admission. Written by GREGORIO El, as dictated by Sourav Sage MD.    CONSULT NOTE:   9:23 Madhu Rossi MD spoke with Deisy Villanueva MD   Specialty: GYN/ONC  Discussed pt's hx, disposition, and available diagnostic and imaging results. Reviewed care plans. Consultant agrees with plans as outlined. Dr. Martha Barriga states they should be able to see her in the next two days, possibly tomorrow if she calls tomorrow. He does not feel she needs to be admitted unless pain is too bad. He does not immediately know what they could do for her, however radiation could be a possibility. Written by GREGORIO El, as dictated by Sourav Sage MD.    PROGRESS NOTE:  9:32 PM  Pt states she would like to stay here tonight for pain control. Written by GREGORIO El, as dictated by Sourav Sage MD.    PROGRESS NOTE:  9:37 PM  Updated Dr. Martha Barriga on pt's wishes and plan of care. Written by GREGORIO El, as dictated by Sourav Sage MD.    Disposition:    ADMIT NOTE:  9:12 PM  The patient is being admitted to the hospital by Rika Del Cid MD.  The results of their tests and reasons for their admission have been discussed with the patient and/or available family. They convey agreement and understanding for the need to be admitted and for their admission diagnosis. PLAN:  1. Admit to hospitalist     Diagnosis     Clinical Impression: Right chest wall mass     Lymphadenopathy     Ovarian cancer    Attestations:     This note is prepared by Lily Villagomez Ambreen Camarena, acting as Scribe for Adri Spencer MD.    Adri Spencer MD: The scribe's documentation has been prepared under my direction and personally reviewed by me in its entirety. I confirm that the note above accurately reflects all work, treatment, procedures, and medical decision making performed by me. This note will not be viewable in [address]

## 2018-04-11 ENCOUNTER — APPOINTMENT (OUTPATIENT)
Dept: CT IMAGING | Age: 58
End: 2018-04-11
Attending: INTERNAL MEDICINE
Payer: MEDICARE

## 2018-04-11 ENCOUNTER — APPOINTMENT (OUTPATIENT)
Dept: ULTRASOUND IMAGING | Age: 58
End: 2018-04-11
Attending: INTERNAL MEDICINE
Payer: MEDICARE

## 2018-04-11 VITALS
BODY MASS INDEX: 43.4 KG/M2 | TEMPERATURE: 97.4 F | SYSTOLIC BLOOD PRESSURE: 105 MMHG | DIASTOLIC BLOOD PRESSURE: 52 MMHG | WEIGHT: 293 LBS | OXYGEN SATURATION: 95 % | RESPIRATION RATE: 18 BRPM | HEIGHT: 69 IN | HEART RATE: 56 BPM

## 2018-04-11 LAB
ANION GAP SERPL CALC-SCNC: 7 MMOL/L (ref 5–15)
BUN SERPL-MCNC: 24 MG/DL (ref 6–20)
BUN/CREAT SERPL: 19 (ref 12–20)
CALCIUM SERPL-MCNC: 8.8 MG/DL (ref 8.5–10.1)
CHLORIDE SERPL-SCNC: 114 MMOL/L (ref 97–108)
CO2 SERPL-SCNC: 22 MMOL/L (ref 21–32)
CREAT SERPL-MCNC: 1.26 MG/DL (ref 0.55–1.02)
ERYTHROCYTE [DISTWIDTH] IN BLOOD BY AUTOMATED COUNT: 12.9 % (ref 11.5–14.5)
GLUCOSE BLD STRIP.AUTO-MCNC: 101 MG/DL (ref 65–100)
GLUCOSE BLD STRIP.AUTO-MCNC: 146 MG/DL (ref 65–100)
GLUCOSE BLD STRIP.AUTO-MCNC: 99 MG/DL (ref 65–100)
GLUCOSE SERPL-MCNC: 129 MG/DL (ref 65–100)
HCT VFR BLD AUTO: 27.4 % (ref 35–47)
HGB BLD-MCNC: 8.5 G/DL (ref 11.5–16)
MCH RBC QN AUTO: 28.9 PG (ref 26–34)
MCHC RBC AUTO-ENTMCNC: 31 G/DL (ref 30–36.5)
MCV RBC AUTO: 93.2 FL (ref 80–99)
NRBC # BLD: 0 K/UL (ref 0–0.01)
NRBC BLD-RTO: 0 PER 100 WBC
PLATELET # BLD AUTO: 159 K/UL (ref 150–400)
PMV BLD AUTO: 9.6 FL (ref 8.9–12.9)
POTASSIUM SERPL-SCNC: 4.3 MMOL/L (ref 3.5–5.1)
RBC # BLD AUTO: 2.94 M/UL (ref 3.8–5.2)
SERVICE CMNT-IMP: ABNORMAL
SERVICE CMNT-IMP: ABNORMAL
SERVICE CMNT-IMP: NORMAL
SODIUM SERPL-SCNC: 143 MMOL/L (ref 136–145)
WBC # BLD AUTO: 6.9 K/UL (ref 3.6–11)

## 2018-04-11 PROCEDURE — 93970 EXTREMITY STUDY: CPT

## 2018-04-11 PROCEDURE — 76450000000

## 2018-04-11 PROCEDURE — 88173 CYTOPATH EVAL FNA REPORT: CPT | Performed by: INTERNAL MEDICINE

## 2018-04-11 PROCEDURE — 74011000250 HC RX REV CODE- 250: Performed by: INTERNAL MEDICINE

## 2018-04-11 PROCEDURE — 36415 COLL VENOUS BLD VENIPUNCTURE: CPT | Performed by: INTERNAL MEDICINE

## 2018-04-11 PROCEDURE — 99218 HC RM OBSERVATION: CPT

## 2018-04-11 PROCEDURE — 88333 PATH CONSLTJ SURG CYTO XM 1: CPT | Performed by: INTERNAL MEDICINE

## 2018-04-11 PROCEDURE — 80048 BASIC METABOLIC PNL TOTAL CA: CPT | Performed by: INTERNAL MEDICINE

## 2018-04-11 PROCEDURE — 77030003375 HC MRK BIOP BEEK -A

## 2018-04-11 PROCEDURE — 74011250637 HC RX REV CODE- 250/637: Performed by: INTERNAL MEDICINE

## 2018-04-11 PROCEDURE — 10022: CPT

## 2018-04-11 PROCEDURE — 74011250636 HC RX REV CODE- 250/636: Performed by: RADIOLOGY

## 2018-04-11 PROCEDURE — 88305 TISSUE EXAM BY PATHOLOGIST: CPT | Performed by: INTERNAL MEDICINE

## 2018-04-11 PROCEDURE — 99153 MOD SED SAME PHYS/QHP EA: CPT

## 2018-04-11 PROCEDURE — 85027 COMPLETE CBC AUTOMATED: CPT | Performed by: INTERNAL MEDICINE

## 2018-04-11 PROCEDURE — 77030003503 HC NDL BIOP TISS BD -B

## 2018-04-11 PROCEDURE — 74011636637 HC RX REV CODE- 636/637: Performed by: INTERNAL MEDICINE

## 2018-04-11 PROCEDURE — 82962 GLUCOSE BLOOD TEST: CPT

## 2018-04-11 PROCEDURE — 88342 IMHCHEM/IMCYTCHM 1ST ANTB: CPT | Performed by: INTERNAL MEDICINE

## 2018-04-11 PROCEDURE — 88341 IMHCHEM/IMCYTCHM EA ADD ANTB: CPT | Performed by: INTERNAL MEDICINE

## 2018-04-11 PROCEDURE — 77030027138 HC INCENT SPIROMETER -A

## 2018-04-11 RX ORDER — MIDAZOLAM HYDROCHLORIDE 1 MG/ML
5 INJECTION, SOLUTION INTRAMUSCULAR; INTRAVENOUS
Status: DISCONTINUED | OUTPATIENT
Start: 2018-04-11 | End: 2018-04-11

## 2018-04-11 RX ORDER — FENTANYL CITRATE 50 UG/ML
100 INJECTION, SOLUTION INTRAMUSCULAR; INTRAVENOUS
Status: DISCONTINUED | OUTPATIENT
Start: 2018-04-11 | End: 2018-04-11

## 2018-04-11 RX ORDER — LIDOCAINE HYDROCHLORIDE 10 MG/ML
INJECTION, SOLUTION EPIDURAL; INFILTRATION; INTRACAUDAL; PERINEURAL
Status: DISPENSED
Start: 2018-04-11 | End: 2018-04-12

## 2018-04-11 RX ORDER — HYDROCODONE BITARTRATE AND ACETAMINOPHEN 5; 325 MG/1; MG/1
1 TABLET ORAL
Qty: 30 TAB | Refills: 0 | Status: SHIPPED | OUTPATIENT
Start: 2018-04-11 | End: 2018-11-27

## 2018-04-11 RX ORDER — LIDOCAINE HYDROCHLORIDE 10 MG/ML
10 INJECTION, SOLUTION EPIDURAL; INFILTRATION; INTRACAUDAL; PERINEURAL
Status: COMPLETED | OUTPATIENT
Start: 2018-04-11 | End: 2018-04-11

## 2018-04-11 RX ORDER — NALOXONE HYDROCHLORIDE 2 MG/.4ML
2 INJECTION, SOLUTION INTRAMUSCULAR; SUBCUTANEOUS
Qty: 1 DEVICE | Refills: 1 | Status: ON HOLD | OUTPATIENT
Start: 2018-04-11 | End: 2018-07-17

## 2018-04-11 RX ADMIN — CYCLOSPORINE 1 DROP: 0.5 EMULSION OPHTHALMIC at 09:37

## 2018-04-11 RX ADMIN — FLUTICASONE PROPIONATE 2 SPRAY: 50 SPRAY, METERED NASAL at 14:03

## 2018-04-11 RX ADMIN — LIDOCAINE HYDROCHLORIDE 10 ML: 10 INJECTION, SOLUTION EPIDURAL; INFILTRATION; INTRACAUDAL; PERINEURAL at 13:19

## 2018-04-11 RX ADMIN — VALSARTAN 80 MG: 80 TABLET, FILM COATED ORAL at 09:26

## 2018-04-11 RX ADMIN — Medication 10 ML: at 14:05

## 2018-04-11 RX ADMIN — FENTANYL CITRATE 50 MCG: 50 INJECTION, SOLUTION INTRAMUSCULAR; INTRAVENOUS at 13:20

## 2018-04-11 RX ADMIN — LEVOTHYROXINE SODIUM 150 MCG: 150 TABLET ORAL at 07:04

## 2018-04-11 RX ADMIN — HYDROCODONE BITARTRATE AND ACETAMINOPHEN 1 TABLET: 5; 325 TABLET ORAL at 18:53

## 2018-04-11 RX ADMIN — HYDROCODONE BITARTRATE AND ACETAMINOPHEN 1 TABLET: 5; 325 TABLET ORAL at 09:27

## 2018-04-11 RX ADMIN — FLUTICASONE FUROATE AND VILANTEROL TRIFENATATE 1 PUFF: 100; 25 POWDER RESPIRATORY (INHALATION) at 14:03

## 2018-04-11 RX ADMIN — MIDAZOLAM 1 MG: 1 INJECTION INTRAMUSCULAR; INTRAVENOUS at 13:28

## 2018-04-11 RX ADMIN — OMEGA-3 FATTY ACIDS CAP 1000 MG 1 CAPSULE: 1000 CAP at 09:27

## 2018-04-11 RX ADMIN — INSULIN LISPRO 2 UNITS: 100 INJECTION, SOLUTION INTRAVENOUS; SUBCUTANEOUS at 18:07

## 2018-04-11 RX ADMIN — LORATADINE 10 MG: 10 TABLET ORAL at 09:27

## 2018-04-11 RX ADMIN — INSULIN GLARGINE 20 UNITS: 100 INJECTION, SOLUTION SUBCUTANEOUS at 18:07

## 2018-04-11 RX ADMIN — MONTELUKAST SODIUM 10 MG: 10 TABLET, FILM COATED ORAL at 09:27

## 2018-04-11 RX ADMIN — INSULIN GLARGINE 20 UNITS: 100 INJECTION, SOLUTION SUBCUTANEOUS at 09:26

## 2018-04-11 RX ADMIN — PANTOPRAZOLE SODIUM 40 MG: 40 TABLET, DELAYED RELEASE ORAL at 07:04

## 2018-04-11 RX ADMIN — Medication 10 ML: at 07:04

## 2018-04-11 RX ADMIN — SERTRALINE HYDROCHLORIDE 50 MG: 50 TABLET ORAL at 09:27

## 2018-04-11 RX ADMIN — MIDAZOLAM 2 MG: 1 INJECTION INTRAMUSCULAR; INTRAVENOUS at 13:20

## 2018-04-11 RX ADMIN — FENTANYL CITRATE 25 MCG: 50 INJECTION, SOLUTION INTRAMUSCULAR; INTRAVENOUS at 13:28

## 2018-04-11 NOTE — PROGRESS NOTES
General Surgery End of Shift Nursing Note    Bedside shift change report given to Sherie Massey (oncoming nurse) by Kan Gann (offgoing nurse). Report included the following information SBAR, Kardex, ED Summary, OR Summary, Procedure Summary, Intake/Output, MAR, Accordion and Recent Results. Shift worked:   7a-7p   Summary of shift:    Pt had needle Bx done, Palliative consult today. Pt will d/c on noc shift, pts d/c papers printed and given to noc RN. Issues for physician to address:   none     Number times ambulated in hallway past shift: 0    Number of times OOB to chair past shift: 0    Pain Management:  Current medication: norco  Patient states pain is manageable on current pain medication: YES    GI:    Current diet:  DIET DIABETIC CONSISTENT CARB Regular    Tolerating current diet: YES  Passing flatus: YES  Last Bowel Movement: yesterday      Respiratory:    Incentive Spirometer at bedside: NO  Patient instructed on use: NO    Patient Safety:    Falls Score: 1  Bed Alarm On? Not applicable  Sitter?  Not applicable    Geraldene Epley, RN

## 2018-04-11 NOTE — PROGRESS NOTES
Spiritual Care Assessment/Progress Note  [facility]      NAME: Kisha Salazar      MRN: [id number]  AGE: 65 y.o.  SEX: female  Adventism Affiliation: Alevism   Language: English     4/11/2018     Total Time (in minutes): 19     Spiritual Assessment begun in [facility] through conversation with:         [x]Patient        [] Family    [x] Friend(s)        Reason for Consult: Palliative Care, Initial/Spiritual Assessment     Spiritual beliefs: (Please include comment if needed)     [x] Involved in a isaac tradition/spiritual practice:     [x] Supported by a isaac community:      [] Claims no spiritual orientation:      [] Seeking spiritual identity:           [] Adheres to an individual form of spirituality:      [] Not able to assess:                     Identified resources for coping:      [x] Prayer                  [] Devotional reading               [] Music                  [] Guided Imagery     [x] Family/friends                 [] Pet visits     [] Other:        Interventions offered during this visit: (See comments for more details)    Patient Interventions: Affirmation of emotions/emotional suffering, Affirmation of isaac, Prayer (actual), Initial/Spiritual assessment, patient floor, Adventism beliefs/image of God discussed, Iconic (affirming the presence of God/Higher Power), Coping skills reviewed/reinforced           Plan of Care:     [] Discuss Spiritual/Cultural needs    [] Support AMD and/or advance care planning process      [] Support grieving process   [] Coordinate Rites/Rituals    [] Coordination with community clergy   [] No spiritual needs identified at this time   [x] Detailed Plan of Care below (See Comments)  [] Make referral to Music Therapy  [] Make referral to Pet Therapy     [] Make referral to Addiction services  [] Make referral to [facility]  [] Make referral to Spiritual Care Partner  [] No future visits requested           Initial Spiritual Assessment in 2139 with Palliative pt. Pt accompanied by boyfriend who stepped out as  and pt spoke. Pt processed with  regarding issues leading to hospitalization and associated concerns. Pt expressed praise to God throughout conversation. Shared that she has a close friend who is a  and was supportive as both battled different cancers around the same time. Provided active listening affirming pt's isaac. Pt well supported and appeared to be in positive spirits. Expressed feeling hopeful and trusting God. Prayed with pt to conclude visit, advised of availability. Will follow up as needed. No other specific needs expressed at this time. KEVIN Basilio. Kong Guerrero

## 2018-04-11 NOTE — PROGRESS NOTES
Name of procedure: Mediastinal Mass Biopsy    Complications, if any, r/t procedure: none    Sedation medications given: 3 mg Versed, 75 mcg Fentanyl    Sedation tolerated: well    VS : Stable    Post Procedure Care Needed/order sets in [facility]: se orders    Pt tolerated procedure well. VSS. No C/O pain. Dressing to site D&I. No bleeding or hematoma noted to site. Report called to Kavya Black, [facility]. Pt taken back to room by transport. Floor RN to assume care of pt. NAD noted at time of transfer. TRANSFER - OUT REPORT:    Verbal report given to Kavya Black RN on [place] [id number]  being transferred to general surgery for routine progression of care       Report consisted of patients Situation, Background, Assessment and   Recommendations(SBAR). Information from the following report was reviewed with the receiving nurse. Lines:   Peripheral IV 04/10/18 Left Antecubital (Active)   Site Assessment Clean, dry, & intact 4/11/2018  8:00 AM   Phlebitis Assessment 0 4/11/2018  8:00 AM   Infiltration Assessment 0 4/11/2018  8:00 AM   Dressing Status Clean, dry, & intact 4/11/2018  8:00 AM   Dressing Type Transparent 4/11/2018  8:00 AM   Hub Color/Line Status Pink 4/11/2018  8:00 AM        Opportunity for questions and clarification was provided.

## 2018-04-11 NOTE — H&P
Hospitalist Admission Note    NAME: Guicho Quintana   :  1960   MRN:  [id number]     Date/Time:  4/10/2018 9:09 PM    Patient PCP: Natalie Porter MD  ______________________________________________________________________  Given the patient's current clinical presentation, I have a high level of concern for decompensation if discharged from the emergency department. Complex decision making was performed, which includes reviewing the patient's available past medical records, laboratory results, and x-ray films. My assessment of this patient's clinical condition and my plan of care is as follows. Assessment / Plan:  R chest mass with musculoskeletal pain in setting of serous high grade stage 3c ovarian cancer s/p chemotherapy:  Per Pt, had her portacath taken out . Sees Dr. Lynnette Omer with gyn onc. Says she was treated at [address] [place] [address] in [place] last September and told she was in remission. - CT with chest wall mass in the right parasternal region which invades the sternum and is seen on both the internal internal as well as external sides of the chest wall. Patchy areas of airspace process bilaterally which may represent areas of acute process in the to be correlated clinically. Massive adenopathy in the mediastinum.  Increasing adenopathy in the retrocrural region and upper abdomen.  - observation overnight, CT biopsy in AM  - palliative care consult to establish care, feel Pt will need outpatient support and pain mgmt in the future  Insulin dependent DM2 controlled without complication:  - con't lantus (on lispro + scheduled novolog with meals at home)  - lispro sliding scale  Benign/essential hypertension:  - con't home diovan  - prn nitrobid  Hypothyroidism: con't synthroid   Hyperlipidemia: con't lovaza  Depression with anxiety: con't zoloft and prn klonipin  SHABANA with pulmonary hypertension: family will bring in CPAP tonight  Morbid obesity     Code Status: Full  DVT Prophylaxis: SCDs      Subjective:   CHIEF COMPLAINT:  Chest pain    HISTORY OF PRESENT ILLNESS:     Joel Hyman is a 65 y.o.  female who presents with above. Pt says that she was doing well until this weekend with the exception of fatigue/tiredness. She noted over the weekend that her prior portacath site was tender and seemed swollen as well. She denies fever, cough or URI sx. She does occasionally feel SOB with exertion. She denies chest pain other than in the R upper chest.  She denies nausea but her nurse that visits her once a month noted that she had lost 6 pounds. She denies stool changes. No focal weakness. She has intermittent edema that has been at baseline. Pt tearful in ER, has never met with palliative care but would like to establish for additional support. We were asked to admit for work up and evaluation of the above problems. Past Medical History:   Diagnosis Date    Adverse effect of anesthesia     sleep apnea cpap machine useage     Anemia     Arthritis     DDD low back and knees    Asthma     uses albuterol prn only; none x 6 mos.   Never hospitalized    BRCA negative 1/2013    Calculus of kidney     Chronic kidney disease     kidney stones    Chronic pain     knee pain bilat    CINV (chemotherapy-induced nausea and vomiting) 8/21/2014    Diabetes ([address] Age 46    IDDM    Environmental allergies     Fibromyalgia     GERD (gastroesophageal reflux disease)     controlled with med    Hypertension     Ill-defined condition     Sepsis 9-2015 -  SOURCE PORT    Ill-defined condition 2016    chemotherapy     Morbid obesity ([address]     Murmur, heart     Other and unspecified hyperlipidemia     Ovarian cancer ([address] 9/2012, 1/2014    serous, high grade, stage IIIc. s/p chemotx (has port)    Psychiatric disorder     Rectal bleeding     Thromboembolus ([address] 1993    POST PARTUM; resolved- PELVIS    Thyroid disease HYPOTHYROID    Unspecified sleep apnea     CPAP, Dr. Dora Mosley        Past Surgical History:   Procedure Laterality Date    BREAST SURGERY PROCEDURE UNLISTED      Lymph node in left breast 2017    CHEST SURGERY PROCEDURE UNLISTED      Placement of port a cath right chest    HX GYN       X2    HX HYSTERECTOMY  2012    ROULA/BSO, tumor debulking, omentectomy for ovarian cancer    HX ORTHOPAEDIC      ankle-FX, R    HX ORTHOPAEDIC      FX R ARM    HX UROLOGICAL      stent    HX VASCULAR ACCESS  2015    right chest- port placed       Social History   Substance Use Topics    Smoking status: Never Smoker    Smokeless tobacco: Never Used    Alcohol use Yes      Comment: 2 DRINKS EVERY 2 MONTHS        Family History   Problem Relation Age of Onset    Hypertension Mother     Arthritis-osteo Mother     Lung Disease Father      COPD    Hypertension Father     Arthritis-osteo Father     Cancer Father      PROSTATE    Hypertension Brother     Elevated Lipids Brother     Arthritis-osteo Brother     Hypertension Brother     Elevated Lipids Brother     Obesity Brother     Cancer Brother      PROSTATE    Anesth Problems Son      DELAYED AWAKENING    Diabetes Maternal Grandmother      Allergies   Allergen Reactions    Iodinated Contrast- Oral And Iv Dye Rash     13 hr pre-medication prior to IV contrast    Carboplatin Other (comments)     Patient developed shortness of breath, felt faint, coughing, skin flushed and \"itchy\". This occurred on the patients 8th treatment.  Lipitor [Atorvastatin] Myalgia    Percocet [Oxycodone-Acetaminophen] Other (comments)     fever    Shellfish Containing Products Hives    Tape [Adhesive] Itching and Other (comments)     \"op site-- clear,thin tape\"--caused blister     Tomato Hives        Prior to Admission medications    Medication Sig Start Date End Date Taking?  Authorizing Provider   levothyroxine (SYNTHROID) 150 mcg tablet TAKE 1 TAB BY MOUTH DAILY (BEFORE BREAKFAST). --QDRN [phone] FOR APPOINTMENT FOR MORE REFILLS 3/30/18   Arleen Burgos MD   Liraglutide (VICTOZA) 0.6 mg/0.1 mL (18 mg/3 mL) pnij 1.8 mg by SubCUTAneous route daily (with lunch). 3/27/18   Lynnette Thayer MD   insulin detemir (LEVEMIR FLEXTOUCH) 100 unit/mL (3 mL) inpn INJECT 30 UNITS IN AM AND 50 UNITS AT NIGHT. INCREASE AS DIRECTED TO  UNITS PER DAY--CALL [phone] FOR APPOINTMENT FOR MORE REFILLS 2/14/18   Lynnette Thayer MD   insulin aspart (NOVOLOG FLEXPEN) 100 unit/mL inpn INJECT 20 UNITS BEFORE EACH MEAL + 4 UNITS FOR EVERY 50 MG/DL ABOVE 150 MG/DL.  UNITS PER DAY 2/14/18   Lynnette Thayer MD   Dexlansoprazole [facility]) 60 mg CpDB Take 1 Cap by mouth daily. 2/14/18   Lynnette Thayer MD   potassium chloride (KLOR-CON M10) 10 mEq tablet TAKE 1 TAB BY MOUTH TWO (2) DAYS A WEEK. WED AND FRI 1/5/18   Juan Gotti MD   fluticasone [facility]) 50 mcg/actuation nasal spray 2 SPRAYS IN BOTH NOSTRILS DAILY 1/2/18   Juan Gotti MD   SYMBICORT 160-4.5 mcg/actuation HFAA Take 2 Puffs by inhalation two (2) times daily as needed. 12/26/17   Arleen Burgos MD   valsartan (DIOVAN) 80 mg tablet Take 1 Tab by mouth daily. 12/26/17   Arleen Burgos MD   HYDROcodone-acetaminophen (NORCO) 5-325 mg per tablet Take 1 Tab by mouth every four (4) hours as needed for Pain. Max Daily Amount: 6 Tabs. 12/8/17   Pedro Walter MD   montelukast (SINGULAIR) 10 mg tablet TAKE 1 TAB BY MOUTH DAILY. 12/4/17   Juan Gotti MD   omega-3 acid ethyl esters (LOVAZA) 1 gram capsule TAKE ONE CAPSULE BY MOUTH TWICE A DAY 10/18/17   Milagro Robbins MD   diphenhydrAMINE (BENADRYL) 25 mg capsule Take 25 mg by mouth as needed. 9/20/17   Historical Provider   HYDROcodone-acetaminophen (NORCO) 5-325 mg per tablet Take 1 Tab by mouth every four (4) hours as needed for Pain. Max Daily Amount: 6 Tabs.  9/26/17   Pedro Walter MD   albuterol (PROVENTIL VENTOLIN) 2.5 mg /3 mL (0.083 %) nebulizer solution USE 1 VAIL VIA NEBULIZER EVERY 4 HOURS AS NEEDED FOR WHEEZING 9/14/17   Jacklyn Meier MD   ondansetron (ZOFRAN ODT) 8 mg disintegrating tablet Take 1 Tab by mouth every eight (8) hours as needed for Nausea. 8/2/17   Jacklyn Meier MD   aspirin delayed-release 81 mg tablet Take  by mouth daily. Historical Provider   BD INSULIN PEN NEEDLE UF SHORT 31 gauge x 5/16\" ndle USE WITH INSULIN TO INJECT FIVE TIMES DAILY. 7/26/17   Ray Cehn MD   fluticasone (FLONASE) 50 mcg/actuation nasal spray 2 Sprays by Both Nostrils route daily. 7/10/17   Historical Provider   clonazePAM (KLONOPIN) 0.5 mg tablet Take 0.5 mg by mouth nightly as needed. Historical Provider   nystatin (MYCOSTATIN) powder Apply  to affected area four (4) times daily as needed. Historical Provider   OTHER,NON-FORMULARY, 2 Tabs daily (after dinner). [facility] (vitamin)    Historical Provider   cycloSPORINE (RESTASIS) 0.05 % ophthalmic emulsion 1 Drop two (2) times a day. Historical Provider   sertraline (ZOLOFT) 50 mg tablet TAKE 1 TABLET BY MOUTH EVERY MORNING 2/18/17   Historical Provider   cholecalciferol, VITAMIN D3, (VITAMIN D3) 5,000 unit tab tablet Take 5,000 Units by mouth daily. Historical Provider   furosemide (LASIX) 40 mg tablet Take 80 mg by mouth as needed. 9/15/15   Historical Provider   albuterol (PROVENTIL HFA, VENTOLIN HFA, PROAIR HFA) 90 mcg/actuation inhaler Take 2 Puffs by inhalation every four (4) hours as needed for Wheezing. Historical Provider   fexofenadine (ALLEGRA) 180 mg tablet Take 180 mg by mouth daily. Iris Hernandez MD       REVIEW OF SYSTEMS:     I am not able to complete the review of systems because:    The patient is intubated and sedated    The patient has altered mental status due to his acute medical problems    The patient has baseline aphasia from prior stroke(s)    The patient has baseline dementia and is not reliable historian    The patient is in acute medical distress and unable to provide information           Total of 12 systems reviewed as follows:       POSITIVE= underlined text  Negative = text not underlined  General:  fever, chills, sweats, generalized weakness, weight loss/gain,      loss of appetite   Eyes:    blurred vision, eye pain, loss of vision, double vision  ENT:    rhinorrhea, pharyngitis   Respiratory:   cough, sputum production, SOB, ECKERT, wheezing, pleuritic pain   Cardiology:   chest pain, palpitations, orthopnea, PND, edema, syncope   Gastrointestinal:  abdominal pain , N/V, diarrhea, dysphagia, constipation, bleeding   Genitourinary:  frequency, urgency, dysuria, hematuria, incontinence   Muskuloskeletal :  arthralgia, myalgia, back pain  Hematology:  easy bruising, nose or gum bleeding, lymphadenopathy   Dermatological: rash, ulceration, pruritis, color change / jaundice  Endocrine:   hot flashes or polydipsia   Neurological:  headache, dizziness, confusion, focal weakness, paresthesia,     Speech difficulties, memory loss, gait difficulty  Psychological: Feelings of anxiety, depression, agitation    Objective:   VITALS:    Visit Vitals    /87    Pulse 71    Temp 98.1 °F (36.7 °C)    Resp 24    Ht 5' 9\" (1.753 m)    Wt 140.6 kg (310 lb)    SpO2 96%    BMI 45.78 kg/m2       PHYSICAL EXAM:    General:    Obese, alert, cooperative, no distress, appears stated age. HEENT: Atraumatic, anicteric sclerae, pink conjunctivae     No oral ulcers, mucosa moist, throat clear, dentition fair  Neck:  Supple, symmetrical,  thyroid: non tender  Lungs:   Clear to auscultation bilaterally. No Wheezing or Rhonchi. No rales. Chest wall:  No tenderness  No accessory muscle use. Firm, tender R upper chest mass extending towards sternum  Heart:   Regular  rhythm,  No  murmur   2+ edema  Abdomen:   Soft, non-tender. moderately distended. Bowel sounds normal  Extremities: No cyanosis.   No clubbing,      Skin turgor normal, Capillary refill normal, Radial dial pulse 2+  Skin:     Not pale. Not Jaundiced  No rashes   Psych:  Fair insight. Not depressed. Not anxious or agitated. Tearful. Neurologic: EOMs intact. No facial asymmetry. No aphasia or slurred speech. Symmetrical strength, Sensation grossly intact. Alert and oriented X 4.     _______________________________________________________________________  Care Plan discussed with:    Comments   Patient x    Family  x    RN x    Care Manager                    Consultant:      _______________________________________________________________________  Expected  Disposition:   Home with Family x   HH/PT/OT/RN    SNF/LTC    DELICIA    ________________________________________________________________________  TOTAL TIME:  48 Minutes    Critical Care Provided     Minutes non procedure based      Comments    x Reviewed previous records   >50% of visit spent in counseling and coordination of care x Discussion with patient and/or family and questions answered       ________________________________________________________________________  Signed: Tariq Tomas MD    Procedures: see electronic medical records for all procedures/Xrays and details which were not copied into this note but were reviewed prior to creation of Plan.     LAB DATA REVIEWED:    Recent Results (from the past 24 hour(s))   EKG, 12 LEAD, INITIAL    Collection Time: 04/10/18  6:22 PM   Result Value Ref Range    Ventricular Rate 80 BPM    Atrial Rate 80 BPM    P-R Interval 158 ms    QRS Duration 80 ms    Q-T Interval 362 ms    QTC Calculation (Bezet) 417 ms    Calculated P Axis 65 degrees    Calculated R Axis -4 degrees    Calculated T Axis 26 degrees    Diagnosis       Normal sinus rhythm  Cannot rule out Anterior infarct , age undetermined  Abnormal ECG     CBC WITH AUTOMATED DIFF    Collection Time: 04/10/18  7:33 PM   Result Value Ref Range    WBC 7.9 3.6 - 11.0 K/uL    RBC 3.22 (L) 3.80 - 5.20 M/uL    HGB 9.3 (L) 11.5 - 16.0 g/dL    HCT 29.8 (L) 35.0 - 47.0 % MCV 92.5 80.0 - 99.0 FL    MCH 28.9 26.0 - 34.0 PG    MCHC 31.2 30.0 - 36.5 g/dL    RDW 12.8 11.5 - 14.5 %    PLATELET 477 902 - 843 K/uL    MPV 10.0 8.9 - 12.9 FL    NRBC 0.0 0  WBC    ABSOLUTE NRBC 0.00 0.00 - 0.01 K/uL    NEUTROPHILS 62 32 - 75 %    LYMPHOCYTES 23 12 - 49 %    MONOCYTES 10 5 - 13 %    EOSINOPHILS 4 0 - 7 %    BASOPHILS 1 0 - 1 %    IMMATURE GRANULOCYTES 0 0.0 - 0.5 %    ABS. NEUTROPHILS 4.9 1.8 - 8.0 K/UL    ABS. LYMPHOCYTES 1.9 0.8 - 3.5 K/UL    ABS. MONOCYTES 0.8 0.0 - 1.0 K/UL    ABS. EOSINOPHILS 0.3 0.0 - 0.4 K/UL    ABS. BASOPHILS 0.0 0.0 - 0.1 K/UL    ABS. IMM. GRANS. 0.0 0.00 - 0.04 K/UL    DF AUTOMATED     METABOLIC PANEL, COMPREHENSIVE    Collection Time: 04/10/18  7:33 PM   Result Value Ref Range    Sodium 142 136 - 145 mmol/L    Potassium 4.6 3.5 - 5.1 mmol/L    Chloride 110 (H) 97 - 108 mmol/L    CO2 24 21 - 32 mmol/L    Anion gap 8 5 - 15 mmol/L    Glucose 134 (H) 65 - 100 mg/dL    BUN 24 (H) 6 - 20 MG/DL    Creatinine 1.29 (H) 0.55 - 1.02 MG/DL    BUN/Creatinine ratio 19 12 - 20      GFR est AA 52 (L) >60 ml/min/1.73m2    GFR est non-AA 43 (L) >60 ml/min/1.73m2    Calcium 9.0 8.5 - 10.1 MG/DL    Bilirubin, total 0.3 0.2 - 1.0 MG/DL    ALT (SGPT) 18 12 - 78 U/L    AST (SGOT) 46 (H) 15 - 37 U/L    Alk. phosphatase 88 45 - 117 U/L    Protein, total 9.2 (H) 6.4 - 8.2 g/dL    Albumin 3.4 (L) 3.5 - 5.0 g/dL    Globulin 5.8 (H) 2.0 - 4.0 g/dL    A-G Ratio 0.6 (L) 1.1 - 2.2     TROPONIN I    Collection Time: 04/10/18  7:33 PM   Result Value Ref Range    Troponin-I, Qt. <0.04 <0.05 ng/mL   CK W/ REFLX CKMB    Collection Time: 04/10/18  7:33 PM   Result Value Ref Range     (H) 26 - 192 U/L   CK-MB,QUANT.     Collection Time: 04/10/18  7:33 PM   Result Value Ref Range    CK - MB 1.2 <3.6 NG/ML    CK-MB Index 0.3 0 - 2.5

## 2018-04-11 NOTE — CONSULTS
Palliative Medicine Consult  Maurice: [phone] ([phone])    Patient Name: River Batista  YOB: 1960    Date of Initial Consult: 4/11/18  Reason for Consult: overwhelming symptoms  Requesting Provider:  Loretta Felix MD  Primary Care Physician: Gamal Cabrera MD     SUMMARY:   River Batista is a 65 y.o. female with a past history of ovarian carcinomatosis s/p chemotheraphy, rocio cath taken out on 1/2018, IDDM, asthma , pulmonary hypertension and hypothroidism ,  Depression , anxiety, was seen in [facility] urgent care on 4/10 for right upper chest wall pain swelling x 3 days  who was admitted on 4/10/2018 from home with a diagnosis of right upper chest pain, with swelling and tenderness of prior rocio cath site, Ct of chest shows,   chest wall mass in the right parasternal region which invades the sternum and is seen on both the internal internal as well as external sides of sternum,Massive adenopathy in the mediastinum noted and Increasing adenopathy in the retrocrural region and upper abdomen     home pain medication : Norco, 5/325     . Current medical issues leading to Palliative Medicine involvement include: Overwhelming symptoms, right sided chest  pain , weight loss, fatigue, sob on exertion . Psychosocial : lives with her  Ilene Anderson,  x4 months, two sons from previous marriage , 25 and 32 lives with her . Nichole Vernon 25year old is mpoa. At base line independent for function . PALLIATIVE DIAGNOSES:   1. Pain   2. Sob   3. Weight loss  4. Fatigue. PLAN:     Introduce palliative care service. Pain :    Consider norco 5/325, q 6 hours , given renal dysfunction  Used  2300, and 0900. Schedule norco 5/325 q 6 hrsD if pain not controlled.     Consider gabapentin 100 mg q hs if pain uncontrolled with scheduled norco.    Swelling and tenderness of left leg :  Duplex of lower extremities r/o DVT( high risk because of malignancy )    Educated on side effects of opioids. 2. Weight loss: dietary consult     3. Depression : related to multiple losses in last 2 year,   in  2016, dad   in 2017, currently stable , with medication     Good support from  ,  to 4 years. 4. Spritual : she is Taoism , appreciate  support      Patient is agreeable for referral to palliative care service . Referral sent to out patient coordinator Diego Rebolledo. Initial consult note routed to primary continuity provider  Communicated plan of care with: Palliative IDT       GOALS OF CARE / TREATMENT PREFERENCES:     GOALS OF CARE:  Patient/Health Care Proxy Stated Goals: Prolong life      TREATMENT PREFERENCES:   Code Status: Full Code    Advance Care Planning:  Advance Care Planning 2018   Patient's Healthcare Decision Maker is: Verbal statement (Legal Next of Kin remains as decision maker)   Primary Decision Maker Name Neelam Chang   Primary Decision Maker Phone Number [phone]   Primary Decision Maker Relationship to Patient Adult child   Confirm Advance Directive None   Patient Would Like to Complete Advance Directive -       Medical Interventions: Full interventions   Other Instructions: Other:    As far as possible, the palliative care team has discussed with patient / health care proxy about goals of care / treatment preferences for patient. HISTORY:     History obtained from: chart and patient . CHIEF COMPLAINT: admitted for above. HPI/SUBJECTIVE:    The patient is:   [] Verbal and participatory  Pain in the right side of chest x 3 days associated with cough and sob not relieved with tylenol and norco.    Patient is s/p chemotherapy last April with Dr Liseth Nieves. Currently pain is 6/10. Depression is stable. Weight loss x 6 lbs x one month. Bowels are moving .     Clinical Pain Assessment (nonverbal scale for severity on nonverbal patients):   Clinical Pain Assessment  Severity: 6  Location: Upper part of chest  Character: sharp  Duration: few days  Effect: difficult to move. Factors: percocet helps some  Frequency: comes and go. Duration: for how long has pt been experiencing pain (e.g., 2 days, 1 month, years)  Frequency: how often pain is an issue (e.g., several times per day, once every few days, constant)     FUNCTIONAL ASSESSMENT:     Palliative Performance Scale (PPS):  PPS: 50       PSYCHOSOCIAL/SPIRITUAL SCREENING:     Palliative IDT has assessed this patient for cultural preferences / practices and a referral made as appropriate to needs (Cultural Services, Patient Advocacy, Ethics, etc.)    Advance Care Planning:  Advance Care Planning 4/11/2018   Patient's Healthcare Decision Maker is: Verbal statement (Legal Next of Kin remains as decision maker)   Primary Decision Maker Name Rosa Maria Lay   Primary Decision Maker Phone Number [phone]   Primary Decision Maker Relationship to Patient Adult child   Confirm Advance Directive None   Patient Would Like to Complete Advance Directive -       Any spiritual / Faith concerns:  [] Yes /  [x] No    Caregiver Burnout:  [] Yes /  [x] No /  [] No Caregiver Present      Anticipatory grief assessment:   [x] Normal  / [] Maladaptive       ESAS Anxiety: Anxiety: 0    ESAS Depression: Depression: 0        REVIEW OF SYSTEMS:     Positive and pertinent negative findings in ROS are noted above in HPI. The following systems were [x] reviewed / [] unable to be reviewed as noted in HPI  Other findings are noted below. Systems: constitutional, ears/nose/mouth/throat, respiratory, gastrointestinal, genitourinary, musculoskeletal, integumentary, neurologic, psychiatric, endocrine. Positive findings noted below.   Modified ESAS Completed by: provider   Fatigue: 5 Drowsiness: 0   Depression: 0 Pain: 6   Anxiety: 0 Nausea: 0   Anorexia: 0 Dyspnea: 2     Constipation: No              PHYSICAL EXAM:     From RN flowsheet:  Wt Readings from Last 3 Encounters:   04/10/18 310 lb (140.6 kg)   04/10/18 316 lb (143.3 kg)   02/14/18 316 lb (143.3 kg)     Blood pressure 105/52, pulse (!) 56, temperature 97.4 °F (36.3 °C), resp. rate 18, height 5' 9\" (1.753 m), weight 310 lb (140.6 kg), last menstrual period 09/07/2011, SpO2 95 %. Pain Scale 1: Numeric (0 - 10)  Pain Intensity 1: 0  Pain Onset 1: 3 days ago  Pain Location 1: Chest, Sternum  Pain Orientation 1: Right  Pain Description 1: Aching  Pain Intervention(s) 1: Medication (see MAR)  Last bowel movement, if known:     Constitutional: alert, awake , oriented x 3. Eyes: pupils equal, anicteric  ENMT: no nasal discharge, moist mucous membranes  Cardiovascular: regular rhythm, swelling of both legs, left more than right , tenderness of calf   Respiratory: breathing not labored, symmetric  Gastrointestinal: soft non-tender, +bowel sounds  Musculoskeletal: mass on the upper part of sternum , non tender. Skin: warm, dry  Neurologic: following commands, moving all extremities  Psychiatric: full affect, no hallucinations  Other:       HISTORY:     Active Problems:    Chest mass (4/10/2018)      Chest wall mass (4/10/2018)      Past Medical History:   Diagnosis Date    Adverse effect of anesthesia     sleep apnea cpap machine useage     Anemia     Arthritis     DDD low back and knees    Asthma     uses albuterol prn only; none x 6 mos.   Never hospitalized    BRCA negative 1/2013    Calculus of kidney     Chronic kidney disease     kidney stones    Chronic pain     knee pain bilat    CINV (chemotherapy-induced nausea and vomiting) 8/21/2014    Diabetes ([facility] [address] Age 46    IDDM    Environmental allergies     Fibromyalgia     GERD (gastroesophageal reflux disease)     controlled with med    Hypertension     Ill-defined condition     Sepsis 9-2015 -  SOURCE PORT    Ill-defined condition 2016    chemotherapy     Morbid obesity (HCC)     Murmur, heart     Other and unspecified hyperlipidemia     Ovarian cancer ([facility] [address] 2012, 2014    serous, high grade, stage IIIc. s/p chemotx (has port)    Psychiatric disorder     Rectal bleeding     Thromboembolus ([facility] [address]     POST PARTUM; resolved- PELVIS    Thyroid disease     HYPOTHYROID    Unspecified sleep apnea     CPAP, Dr. Chetna Kulkarni      Past Surgical History:   Procedure Laterality Date    BREAST SURGERY PROCEDURE UNLISTED      Lymph node in left breast 2017    CHEST SURGERY PROCEDURE UNLISTED      Placement of port a cath right chest    HX GYN       X2    HX HYSTERECTOMY  2012    ROULA/BSO, tumor debulking, omentectomy for ovarian cancer    HX ORTHOPAEDIC      ankle-FX, R    HX ORTHOPAEDIC      FX R ARM    HX UROLOGICAL      stent    HX VASCULAR ACCESS  2015    right chest- port placed      Family History   Problem Relation Age of Onset    Hypertension Mother     Arthritis-osteo Mother     Lung Disease Father      COPD    Hypertension Father     Arthritis-osteo Father     Cancer Father      PROSTATE    Hypertension Brother     Elevated Lipids Brother     Arthritis-osteo Brother     Hypertension Brother     Elevated Lipids Brother     Obesity Brother     Cancer Brother      PROSTATE    Anesth Problems Son      DELAYED AWAKENING    Diabetes Maternal Grandmother       History reviewed, no pertinent family history. Social History   Substance Use Topics    Smoking status: Never Smoker    Smokeless tobacco: Never Used    Alcohol use Yes      Comment: 2 DRINKS EVERY 2 MONTHS     Allergies   Allergen Reactions    Iodinated Contrast- Oral And Iv Dye Rash     13 hr pre-medication prior to IV contrast    Carboplatin Other (comments)     Patient developed shortness of breath, felt faint, coughing, skin flushed and \"itchy\". This occurred on the patients 8th treatment.     Lipitor [Atorvastatin] Myalgia    Percocet [Oxycodone-Acetaminophen] Other (comments)     fever    Shellfish Containing Products Hives    Tape [Adhesive] Itching and Other (comments)     \"op site-- clear,thin tape\"--caused blister     Tomato Hives      Current Facility-Administered Medications   Medication Dose Route Frequency    albuterol (PROVENTIL VENTOLIN) nebulizer solution 2.5 mg  2.5 mg Nebulization Q4H PRN    aspirin delayed-release tablet 81 mg  81 mg Oral DAILY    clonazePAM (KlonoPIN) tablet 0.5 mg  0.5 mg Oral QHS PRN    cycloSPORINE (RESTASIS) 0.05 % ophthalmic emulsion 1 Drop  1 Drop Both Eyes BID    pantoprazole (PROTONIX) tablet 40 mg  40 mg Oral ACB    loratadine (CLARITIN) tablet 10 mg  10 mg Oral DAILY    fluticasone (FLONASE) 50 mcg/actuation nasal spray 2 Spray  2 Spray Both Nostrils DAILY    HYDROcodone-acetaminophen (NORCO) 5-325 mg per tablet 1 Tab  1 Tab Oral Q4H PRN    levothyroxine (SYNTHROID) tablet 150 mcg  150 mcg Oral ACB    montelukast (SINGULAIR) tablet 10 mg  10 mg Oral DAILY    fish oil-omega-3 fatty acids 340-1,000 mg capsule 1 Cap  1 Cap Oral DAILY    sertraline (ZOLOFT) tablet 50 mg  50 mg Oral DAILY    fluticasone-vilanterol (BREO ELLIPTA) 100mcg-25mcg/puff  1 Puff Inhalation DAILY    valsartan (DIOVAN) tablet 80 mg  80 mg Oral DAILY    sodium chloride (NS) flush 5-10 mL  5-10 mL IntraVENous Q8H    sodium chloride (NS) flush 5-10 mL  5-10 mL IntraVENous PRN    acetaminophen (TYLENOL) tablet 650 mg  650 mg Oral Q4H PRN    ondansetron (ZOFRAN) injection 4 mg  4 mg IntraVENous Q4H PRN    bisacodyl (DULCOLAX) tablet 5 mg  5 mg Oral DAILY PRN    nitroglycerin (NITROBID) 2 % ointment 1 Inch  1 Inch Topical Q6H PRN    insulin glargine (LANTUS) injection 20 Units  20 Units SubCUTAneous ACB&D    insulin lispro (HUMALOG) injection   SubCUTAneous AC&HS    glucose chewable tablet 16 g  4 Tab Oral PRN    dextrose (D50W) injection syrg 12.5-25 g  12.5-25 g IntraVENous PRN    glucagon (GLUCAGEN) injection 1 mg  1 mg IntraMUSCular PRN     Current Outpatient Prescriptions   Medication Sig    HYDROcodone-acetaminophen (NORCO) 5-325 mg per tablet Take 1 Tab by mouth every four (4) hours as needed for Pain. Max Daily Amount: 6 Tabs.  naloxone (EVZIO) 2 mg/0.4 mL auto-injector 0.4 mL by IntraMUSCular route once as needed for Overdose for up to 1 dose.  levothyroxine (SYNTHROID) 150 mcg tablet TAKE 1 TAB BY MOUTH DAILY (BEFORE BREAKFAST). --HRYZ [phone] FOR APPOINTMENT FOR MORE REFILLS    Liraglutide (VICTOZA) 0.6 mg/0.1 mL (18 mg/3 mL) pnij 1.8 mg by SubCUTAneous route daily (with lunch).  insulin detemir (LEVEMIR FLEXTOUCH) 100 unit/mL (3 mL) inpn INJECT 30 UNITS IN AM AND 50 UNITS AT NIGHT. INCREASE AS DIRECTED TO  UNITS PER DAY--CALL [phone] FOR APPOINTMENT FOR MORE REFILLS    insulin aspart (NOVOLOG FLEXPEN) 100 unit/mL inpn INJECT 20 UNITS BEFORE EACH MEAL + 4 UNITS FOR EVERY 50 MG/DL ABOVE 150 MG/DL.  UNITS PER DAY    Dexlansoprazole (DEXILANT) 60 mg CpDB Take 1 Cap by mouth daily.  potassium chloride (KLOR-CON M10) 10 mEq tablet TAKE 1 TAB BY MOUTH TWO (2) DAYS A WEEK. WED AND FRI    SYMBICORT 160-4.5 mcg/actuation HFAA Take 2 Puffs by inhalation two (2) times daily as needed.  valsartan (DIOVAN) 80 mg tablet Take 1 Tab by mouth daily.  montelukast (SINGULAIR) 10 mg tablet TAKE 1 TAB BY MOUTH DAILY.  omega-3 acid ethyl esters (LOVAZA) 1 gram capsule TAKE ONE CAPSULE BY MOUTH TWICE A DAY    diphenhydrAMINE (BENADRYL) 25 mg capsule Take 25 mg by mouth as needed.  HYDROcodone-acetaminophen (NORCO) 5-325 mg per tablet Take 1 Tab by mouth every four (4) hours as needed for Pain. Max Daily Amount: 6 Tabs.  albuterol (PROVENTIL VENTOLIN) 2.5 mg /3 mL (0.083 %) nebulizer solution USE 1 VAIL VIA NEBULIZER EVERY 4 HOURS AS NEEDED FOR WHEEZING    ondansetron (ZOFRAN ODT) 8 mg disintegrating tablet Take 1 Tab by mouth every eight (8) hours as needed for Nausea.  aspirin delayed-release 81 mg tablet Take  by mouth daily.     BD INSULIN PEN NEEDLE UF SHORT 31 gauge x 5/16\" ndle USE WITH INSULIN TO INJECT FIVE TIMES DAILY.  fluticasone (FLONASE) 50 mcg/actuation nasal spray 2 Sprays by Both Nostrils route daily.  clonazePAM (KLONOPIN) 0.5 mg tablet Take 0.5 mg by mouth nightly as needed.  nystatin (MYCOSTATIN) powder Apply  to affected area four (4) times daily as needed.  OTHER,NON-FORMULARY, 2 Tabs daily (after dinner). [facility] (vitamin)    cycloSPORINE (RESTASIS) 0.05 % ophthalmic emulsion 1 Drop two (2) times a day.  sertraline (ZOLOFT) 50 mg tablet TAKE 1 TABLET BY MOUTH EVERY MORNING    cholecalciferol, VITAMIN D3, (VITAMIN D3) 5,000 unit tab tablet Take 5,000 Units by mouth daily.  furosemide (LASIX) 40 mg tablet Take 80 mg by mouth as needed.  albuterol (PROVENTIL HFA, VENTOLIN HFA, PROAIR HFA) 90 mcg/actuation inhaler Take 2 Puffs by inhalation every four (4) hours as needed for Wheezing.  fexofenadine (ALLEGRA) 180 mg tablet Take 180 mg by mouth daily. LAB AND IMAGING FINDINGS:     Lab Results   Component Value Date/Time    WBC 6.9 04/11/2018 04:34 AM    HGB 8.5 (L) 04/11/2018 04:34 AM    PLATELET 552 34/90/1415 04:34 AM     Lab Results   Component Value Date/Time    Sodium 143 04/11/2018 04:34 AM    Potassium 4.3 04/11/2018 04:34 AM    Chloride 114 (H) 04/11/2018 04:34 AM    CO2 22 04/11/2018 04:34 AM    BUN 24 (H) 04/11/2018 04:34 AM    Creatinine 1.26 (H) 04/11/2018 04:34 AM    Calcium 8.8 04/11/2018 04:34 AM    Magnesium 1.7 08/28/2017 12:25 PM      Lab Results   Component Value Date/Time    AST (SGOT) 46 (H) 04/10/2018 07:33 PM    Alk.  phosphatase 88 04/10/2018 07:33 PM    Protein, total 9.2 (H) 04/10/2018 07:33 PM    Albumin 3.4 (L) 04/10/2018 07:33 PM    Globulin 5.8 (H) 04/10/2018 07:33 PM     Lab Results   Component Value Date/Time    INR 1.1 04/03/2017 10:25 AM    Prothrombin time 11.1 04/03/2017 10:25 AM    aPTT 23.3 01/16/2015 12:15 PM      No results found for: IRON, FE, TIBC, IBCT, PSAT, FERR   No results found for: PH, PCO2, PO2  No components found for: [place]   Lab Results   Component Value Date/Time    CK - MB 1.2 04/10/2018 07:33 PM                Total time:   Counseling / coordination time, spent as noted above:   > 50% counseling / coordination?:     Prolonged service was provided for  []30 min   []75 min in face to face time in the presence of the patient, spent as noted above. Time Start:   Time End:   Note: this can only be billed with 95979 (initial) or 06110 (follow up). If multiple start / stop times, list each separately.

## 2018-04-11 NOTE — H&P
Radiology History and Physical    Patient: Anh Sprain 65 y.o. female     Chief Complaint:   Chief Complaint   Patient presents with    Chest Pain     to triage by EMS in a wheelchair, SOB and nausea, started a few days ago, denies vomiting, had port a cath placed last year for cancer treatment       History of Present Illness: Chest wall mass. History:    Past Medical History:   Diagnosis Date    Adverse effect of anesthesia     sleep apnea cpap machine useage     Anemia     Arthritis     DDD low back and knees    Asthma     uses albuterol prn only; none x 6 mos.   Never hospitalized    BRCA negative 1/2013    Calculus of kidney     Chronic kidney disease     kidney stones    Chronic pain     knee pain bilat    CINV (chemotherapy-induced nausea and vomiting) 8/21/2014    Diabetes ([address] Age 46    IDDM    Environmental allergies     Fibromyalgia     GERD (gastroesophageal reflux disease)     controlled with med    Hypertension     Ill-defined condition     Sepsis 9-2015 -  SOURCE PORT    Ill-defined condition 2016    chemotherapy     Morbid obesity ([address]     Murmur, heart     Other and unspecified hyperlipidemia     Ovarian cancer ([facility] [address] 9/2012, 1/2014    serous, high grade, stage IIIc. s/p chemotx (has port)    Psychiatric disorder     Rectal bleeding     Thromboembolus ([address] 1993    POST PARTUM; resolved- PELVIS    Thyroid disease     HYPOTHYROID    Unspecified sleep apnea     CPAP, Dr. Mark Gonsales     Family History   Problem Relation Age of Onset    Hypertension Mother     Arthritis-osteo Mother     Lung Disease Father      COPD    Hypertension Father     Arthritis-osteo Father     Cancer Father      PROSTATE    Hypertension Brother     Elevated Lipids Brother     Arthritis-osteo Brother     Hypertension Brother     Elevated Lipids Brother     Obesity Brother     Cancer Brother      PROSTATE    Anesth Problems Son      DELAYED AWAKENING    Diabetes Maternal Grandmother      Social History     Social History    Marital status:      Spouse name: N/A    Number of children: N/A    Years of education: N/A     Occupational History    Not on file. Social History Main Topics    Smoking status: Never Smoker    Smokeless tobacco: Never Used    Alcohol use Yes      Comment: 2 DRINKS EVERY 2 MONTHS    Drug use: No    Sexual activity: Yes     Other Topics Concern    Not on file     Social History Narrative    Lives [place] [facility] [place].  passed away in 5/16 of a heart attack. Has 2 sons. Disability. 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 Enjoys swimming and going to the gym. Allergies: Allergies   Allergen Reactions    Iodinated Contrast- Oral And Iv Dye Rash     13 hr pre-medication prior to IV contrast    Carboplatin Other (comments)     Patient developed shortness of breath, felt faint, coughing, skin flushed and \"itchy\". This occurred on the patients 8th treatment.     Lipitor [Atorvastatin] Myalgia    Percocet [Oxycodone-Acetaminophen] Other (comments)     fever    Shellfish Containing Products Hives    Tape [Adhesive] Itching and Other (comments)     \"op site-- clear,thin tape\"--caused blister     Tomato Hives       Current Medications:  Current Facility-Administered Medications   Medication Dose Route Frequency    lidocaine (PF) (XYLOCAINE) 10 mg/mL (1 %) injection        albuterol (PROVENTIL VENTOLIN) nebulizer solution 2.5 mg  2.5 mg Nebulization Q4H PRN    aspirin delayed-release tablet 81 mg  81 mg Oral DAILY    clonazePAM (KlonoPIN) tablet 0.5 mg  0.5 mg Oral QHS PRN    cycloSPORINE (RESTASIS) 0.05 % ophthalmic emulsion 1 Drop  1 Drop Both Eyes BID    pantoprazole (PROTONIX) tablet 40 mg  40 mg Oral ACB    loratadine (CLARITIN) tablet 10 mg  10 mg Oral DAILY    fluticasone (FLONASE) 50 mcg/actuation nasal spray 2 Spray  2 Spray Both Nostrils DAILY    HYDROcodone-acetaminophen (NORCO) 5-325 mg per tablet 1 Tab  1 Tab Oral Q4H PRN    levothyroxine (SYNTHROID) tablet 150 mcg  150 mcg Oral ACB    montelukast (SINGULAIR) tablet 10 mg  10 mg Oral DAILY    fish oil-omega-3 fatty acids 340-1,000 mg capsule 1 Cap  1 Cap Oral DAILY    sertraline (ZOLOFT) tablet 50 mg  50 mg Oral DAILY    fluticasone-vilanterol (BREO ELLIPTA) 100mcg-25mcg/puff  1 Puff Inhalation DAILY    valsartan (DIOVAN) tablet 80 mg  80 mg Oral DAILY    sodium chloride (NS) flush 5-10 mL  5-10 mL IntraVENous Q8H    sodium chloride (NS) flush 5-10 mL  5-10 mL IntraVENous PRN    acetaminophen (TYLENOL) tablet 650 mg  650 mg Oral Q4H PRN    ondansetron (ZOFRAN) injection 4 mg  4 mg IntraVENous Q4H PRN    bisacodyl (DULCOLAX) tablet 5 mg  5 mg Oral DAILY PRN    nitroglycerin (NITROBID) 2 % ointment 1 Inch  1 Inch Topical Q6H PRN    insulin glargine (LANTUS) injection 20 Units  20 Units SubCUTAneous ACB&D    insulin lispro (HUMALOG) injection   SubCUTAneous AC&HS    glucose chewable tablet 16 g  4 Tab Oral PRN    dextrose (D50W) injection syrg 12.5-25 g  12.5-25 g IntraVENous PRN    glucagon (GLUCAGEN) injection 1 mg  1 mg IntraMUSCular PRN        Physical Exam:  Blood pressure 124/62, pulse 68, temperature 98 °F (36.7 °C), resp. rate 18, height 5' 9\" (1.753 m), weight 140.6 kg (310 lb), last menstrual period 09/07/2011, SpO2 100 %.   GENERAL: alert, cooperative, no distress, appears stated age, LUNG: clear to auscultation bilaterally, HEART: regular rate and rhythm      Alerts:    Hospital Problems  Date Reviewed: 3/28/2018          Codes Class Noted POA    Chest mass ICD-10-CM: R22.2  ICD-9-CM: 786.6  4/10/2018 Unknown        Chest wall mass ICD-10-CM: R22.2  ICD-9-CM: 786.6  4/10/2018 Unknown              Laboratory:      Recent Labs      04/11/18   0434   HGB  8.5*   HCT  27.4*   WBC  6.9   PLT  159   BUN  24*   CREA  1.26*   K  4.3         Plan of Care/Planned Procedure:  Risks, benefits, and alternatives reviewed with patient and she agrees to proceed with the procedure.

## 2018-04-11 NOTE — PROGRESS NOTES
Received patient from ED via stretcher alert and verbally responsive c/o some chest discomfort PRN pain medication given in ED. No family accompanied patient. Will continue to monitor.

## 2018-04-11 NOTE — PROGRESS NOTES
Bedside and Verbal shift change report given to Yamil De La Cruz (oncoming nurse) by Reece Garvin (offgoing nurse). Report included the following information SBAR, Kardex, ED Summary, Intake/Output, MAR and Recent Results.

## 2018-04-11 NOTE — DISCHARGE SUMMARY
Physician Discharge Summary     Patient ID:  Colletta Masson  [id number]  65 y.o.  1960    Admit date: 4/10/2018    Discharge date and time: 4/11/2018    Admission Diagnoses: Chest mass; Chest wall mass    Discharge Diagnoses:  Principal Diagnosis                                               Active Problems:    Chest mass (4/10/2018)      Chest wall mass (4/10/2018)       Consults: Palliative Care and Radiology    Significant Diagnostic Studies:     CT-guided biopsy of chest wall mass. Hospital Course: Ms. Colletta Masson is a patient of mine who presented with the problems above. See the initial H and P for full details. CHIEF COMPLAINT:  Chest pain     HISTORY OF PRESENT ILLNESS:     Colletta Masson is a 65 y.o.  female who presents with above. Pt says that she was doing well until this weekend with the exception of fatigue/tiredness. She noted over the weekend that her prior portacath site was tender and seemed swollen as well. She denies fever, cough or URI sx. She does occasionally feel SOB with exertion. She denies chest pain other than in the R upper chest.  She denies nausea but her nurse that visits her once a month noted that she had lost 6 pounds. She denies stool changes. No focal weakness. She has intermittent edema that has been at baseline. Pt tearful in ER, has never met with palliative care but would like to establish for additional support.     We were asked to admit for work up and evaluation of the above problems. By problem. .. R chest wall mass: Admitted to observation. She has undergone CT guided biopsy. I discussed with the patient tonight the possibilities for this, including stage IV ovarian cancer. I would wait on pathology result before making any assumptions, or recommendations for further treatment. It may take several days for results to be available.      Stage 3C ovarian cancer, s/p chemotherapy: She has been seeing Dr. Ezequiel Adams, and has appt pending for this coming Monday. She is looking to get her care in the [facility], and is interested in [facility]. We'll refer her to this group. Pain: Improved. We consulted palliative care team, and they may be able to help her in the future in inpatient and outpatient setting. I ordered more HC/ APAP for discharge medication. HTN: Continue diovan. DM 2: Continue lantus. Hypothyroidism: Continue levothyroxine. Depression and anxiety: We will continue her current medications. Discharge Exam:  Visit Vitals    /52 (BP 1 Location: Right arm, BP Patient Position: At rest;Supine)    Pulse (!) 56    Temp 97.4 °F (36.3 °C)    Resp 18    Ht 5' 9\" (1.753 m)    Wt 310 lb (140.6 kg)    LMP 09/07/2011    SpO2 95%    BMI 45.78 kg/m2     Gen: Pleasant 65 y.o.  female in NAD.   HEENT: PERRLA. EOMI. OP moist and pink.  Neck: Supple.  No LAD.  HEART: RRR, No M/G/R.   LUNGS: CTAB No W/R.   ABDOMEN: S, NT, ND, BS+.   EXTREMITIES: Warm. No C/C/E. MUSCULOSKELETAL: Normal ROM, muscle strength 5/5 all groups. NEURO: Alert and oriented x 3.  Cranial nerves grossly intact.  No focal sensory or motor deficits noted. SKIN: Warm. Dry. No rashes or other lesions noted. Disposition: home    Patient Instructions:   Current Discharge Medication List      START taking these medications    Details   naloxone (EVZIO) 2 mg/0.4 mL auto-injector 0.4 mL by IntraMUSCular route once as needed for Overdose for up to 1 dose. Qty: 1 Device, Refills: 1         CONTINUE these medications which have CHANGED    Details   !! HYDROcodone-acetaminophen (NORCO) 5-325 mg per tablet Take 1 Tab by mouth every four (4) hours as needed for Pain. Max Daily Amount: 6 Tabs. Qty: 30 Tab, Refills: 0    Associated Diagnoses: Chest pain, atypical       !! - Potential duplicate medications found. Please discuss with provider.       CONTINUE these medications which have NOT CHANGED Details   levothyroxine (SYNTHROID) 150 mcg tablet TAKE 1 TAB BY MOUTH DAILY (BEFORE BREAKFAST). --[place] [phone] FOR APPOINTMENT FOR MORE REFILLS  Qty: 90 Tab, Refills: 0    Associated Diagnoses: Acquired hypothyroidism      Liraglutide (VICTOZA) 0.6 mg/0.1 mL (18 mg/3 mL) pnij 1.8 mg by SubCUTAneous route daily (with lunch). Qty: 3 Pen, Refills: 3    Associated Diagnoses: Type 2 diabetes mellitus with hyperglycemia, with long-term current use of insulin ([facility])      insulin detemir (LEVEMIR FLEXTOUCH) 100 unit/mL (3 mL) inpn INJECT 30 UNITS IN AM AND 50 UNITS AT NIGHT. INCREASE AS DIRECTED TO  UNITS PER DAY--CALL [phone] FOR APPOINTMENT FOR MORE REFILLS  Qty: 30 mL, Refills: 3    Associated Diagnoses: Type 2 diabetes mellitus with hyperglycemia, with long-term current use of insulin ([facility])      insulin aspart (NOVOLOG FLEXPEN) 100 unit/mL inpn INJECT 20 UNITS BEFORE EACH MEAL + 4 UNITS FOR EVERY 50 MG/DL ABOVE 150 MG/DL.  UNITS PER DAY  Qty: 30 mL, Refills: 11    Associated Diagnoses: Type 2 diabetes mellitus with hyperglycemia, with long-term current use of insulin ([facility])      Dexlansoprazole (DEXILANT) 60 mg CpDB Take 1 Cap by mouth daily. Qty: 30 Cap, Refills: 11    Associated Diagnoses: Gastroesophageal reflux disease, esophagitis presence not specified      potassium chloride (KLOR-CON M10) 10 mEq tablet TAKE 1 TAB BY MOUTH TWO (2) DAYS A WEEK. WED AND FRI  Qty: 90 Tab, Refills: 1    Associated Diagnoses: Edema, unspecified type      SYMBICORT 160-4.5 mcg/actuation HFAA Take 2 Puffs by inhalation two (2) times daily as needed. Qty: 2 Inhaler, Refills: 3      valsartan (DIOVAN) 80 mg tablet Take 1 Tab by mouth daily. Qty: 30 Tab, Refills: 5    Associated Diagnoses: Essential hypertension      montelukast (SINGULAIR) 10 mg tablet TAKE 1 TAB BY MOUTH DAILY.   Qty: 90 Tab, Refills: 1    Associated Diagnoses: Other seasonal allergic rhinitis      omega-3 acid ethyl esters (LOVAZA) 1 gram capsule TAKE ONE CAPSULE BY MOUTH TWICE A DAY  Qty: 180 Cap, Refills: 2      diphenhydrAMINE (BENADRYL) 25 mg capsule Take 25 mg by mouth as needed.      !! HYDROcodone-acetaminophen (NORCO) 5-325 mg per tablet Take 1 Tab by mouth every four (4) hours as needed for Pain. Max Daily Amount: 6 Tabs. Qty: 30 Tab, Refills: 0      albuterol (PROVENTIL VENTOLIN) 2.5 mg /3 mL (0.083 %) nebulizer solution USE 1 VAIL VIA NEBULIZER EVERY 4 HOURS AS NEEDED FOR WHEEZING  Qty: 3 Package, Refills: 1      ondansetron (ZOFRAN ODT) 8 mg disintegrating tablet Take 1 Tab by mouth every eight (8) hours as needed for Nausea. Qty: 30 Tab, Refills: 3    Associated Diagnoses: Chemotherapy-induced nausea      aspirin delayed-release 81 mg tablet Take  by mouth daily. BD INSULIN PEN NEEDLE UF SHORT 31 gauge x 5/16\" ndle USE WITH INSULIN TO INJECT FIVE TIMES DAILY. Qty: 200 Pen Needle, Refills: 11      fluticasone (FLONASE) 50 mcg/actuation nasal spray 2 Sprays by Both Nostrils route daily. Associated Diagnoses: Carcinomatosis ([address] Morbid obesity due to excess calories ([address] Ovarian cancer, unspecified laterality ([address] Renal calculi; Acute cystitis without hematuria; Elevated CA-125      clonazePAM (KLONOPIN) 0.5 mg tablet Take 0.5 mg by mouth nightly as needed. nystatin (MYCOSTATIN) powder Apply  to affected area four (4) times daily as needed. OTHER,NON-FORMULARY, 2 Tabs daily (after dinner). [facility] (vitamin)      cycloSPORINE (RESTASIS) 0.05 % ophthalmic emulsion 1 Drop two (2) times a day. sertraline (ZOLOFT) 50 mg tablet TAKE 1 TABLET BY MOUTH EVERY MORNING  Refills: 1      cholecalciferol, VITAMIN D3, (VITAMIN D3) 5,000 unit tab tablet Take 5,000 Units by mouth daily. furosemide (LASIX) 40 mg tablet Take 80 mg by mouth as needed. Refills: 3      albuterol (PROVENTIL HFA, VENTOLIN HFA, PROAIR HFA) 90 mcg/actuation inhaler Take 2 Puffs by inhalation every four (4) hours as needed for Wheezing. fexofenadine (ALLEGRA) 180 mg tablet Take 180 mg by mouth daily. !! - Potential duplicate medications found. Please discuss with provider. Recommended Diet: Resume previous diet  Recommended Activity: Activity as tolerated      Follow-up With  Details  Why  Contact Info   Emerycoty Radha  About 1 week    [address] [facility]  [address] [facility]     Juan Breen MD  As planned    [address] [address] [facility]     Emili Escamilla MD  In 1 week    [address]  [phone]         Signed:  Liam Oliveira MD  4/11/2018  6:44 PM    Note: Greater than 30 minutes were spent on activities related to this discharge.

## 2018-04-11 NOTE — DISCHARGE INSTRUCTIONS
PATIENT DISCHARGE INSTRUCTIONS      PATIENT DISCHARGE INSTRUCTIONS    Sarah Lopez / [id number] : 1960    Admitted 4/10/2018 Discharged: 2018       · It is important that you take the medication exactly as they are prescribed. · Keep your medication in the bottles provided by the pharmacist and keep a list of the medication names, dosages, and times to be taken in your wallet. · Do not take other medications without consulting your doctor.      What to do at Home    Recommended Diet: Resume previous diet    Recommended Activity: Activity as tolerated          Signed By: Christy Vickers MD     2018

## 2018-04-11 NOTE — ED NOTES
TRANSFER - OUT REPORT:    Verbal report given to Dayana Jaime RN(name) on Cathleen PARTIDA [facility]  being transferred to [facility](unit) for routine progression of care       Report consisted of patients Situation, Background, Assessment and   Recommendations(SBAR). Information from the following report(s) SBAR, ED Summary and MAR was reviewed with the receiving nurse. Opportunity for questions and clarification was provided.       Patient transported with:

## 2018-04-11 NOTE — PROCEDURES
PROCEDURE:CT-guided biopsy chest wall mass. INDICATION:ovarian carcinoma by history. ANESTHESIA:sedation and local.  COMPLICATION:NONE. SPECIMENS REMOVED:cores to Dr. Almita Castro. BLOOD LOSS:NONE. /ASSISTANT:DARIEN Fernandez RECOMMENDATIONS:none. CONSENT OBTAINED:YES.  NOTES:none.

## 2018-04-12 LAB
ATRIAL RATE: 80 BPM
CALCULATED P AXIS, ECG09: 65 DEGREES
CALCULATED R AXIS, ECG10: -4 DEGREES
CALCULATED T AXIS, ECG11: 26 DEGREES
DIAGNOSIS, 93000: NORMAL
P-R INTERVAL, ECG05: 158 MS
Q-T INTERVAL, ECG07: 362 MS
QRS DURATION, ECG06: 80 MS
QTC CALCULATION (BEZET), ECG08: 417 MS
VENTRICULAR RATE, ECG03: 80 BPM

## 2018-04-13 DIAGNOSIS — E03.9 ACQUIRED HYPOTHYROIDISM: ICD-10-CM

## 2018-04-13 RX ORDER — LEVOTHYROXINE SODIUM 150 UG/1
TABLET ORAL
Qty: 90 TAB | Refills: 0 | Status: SHIPPED | OUTPATIENT
Start: 2018-04-13 | End: 2018-04-16 | Stop reason: SDUPTHER

## 2018-04-16 ENCOUNTER — OFFICE VISIT (OUTPATIENT)
Dept: GYNECOLOGY | Age: 58
End: 2018-04-16

## 2018-04-16 VITALS
BODY MASS INDEX: 43.4 KG/M2 | SYSTOLIC BLOOD PRESSURE: 130 MMHG | DIASTOLIC BLOOD PRESSURE: 73 MMHG | HEIGHT: 69 IN | WEIGHT: 293 LBS | HEART RATE: 78 BPM

## 2018-04-16 DIAGNOSIS — R22.2 CHEST MASS: ICD-10-CM

## 2018-04-16 DIAGNOSIS — C80.0 CARCINOMATOSIS (HCC): ICD-10-CM

## 2018-04-16 DIAGNOSIS — E66.01 MORBID OBESITY (HCC): ICD-10-CM

## 2018-04-16 DIAGNOSIS — C56.9 OVARIAN CANCER, UNSPECIFIED LATERALITY (HCC): Primary | ICD-10-CM

## 2018-04-16 RX ORDER — LINACLOTIDE 145 UG/1
CAPSULE, GELATIN COATED ORAL
Refills: 0 | COMMUNITY
Start: 2018-03-15 | End: 2018-05-17 | Stop reason: SDUPTHER

## 2018-04-16 RX ORDER — NAPHAZOLINE HCL/GLYCERIN 0.012-0.2%
DROPS OPHTHALMIC (EYE)
Refills: 3 | Status: ON HOLD | COMMUNITY
Start: 2018-03-23 | End: 2018-07-17

## 2018-04-16 NOTE — PROGRESS NOTES
3 month check up, pt complains of right sided chest pain, a knot in her chest came up suddenly, pt was in [facility], pt is here for biopsy results, complains of sharp headaches X weeks that are off and on

## 2018-04-16 NOTE — PROGRESS NOTES
Maegan Adams. Mary, JAD      Date of visit: 4/16/2018       ICD-10-CM ICD-9-CM    1. Ovarian cancer, unspecified laterality ([facility] [address] C56.9 183.0    2. Carcinomatosis ([facility] [address] C80.0 199.0    3. Morbid obesity ([facility] [address] E66.01 278.01    4. Chest mass R22.2 786.6        CT: 4/2018  FINDINGS:   LUNG BASES: Dependent atelectasis. Otherwise clear. INCIDENTALLY IMAGED HEART AND MEDIASTINUM: Unremarkable. LIVER: No mass or biliary dilatation. GALLBLADDER: Unremarkable. SPLEEN: No mass. PANCREAS: No mass or ductal dilatation. ADRENALS: Unremarkable. KIDNEYS: Nonobstructing stones measuring 2 to 3 mm in both kidneys with at least  7 right and 3 left stones. Left renal hilar fullness seen on prior unenhanced CT  is no longer evident and I suspect this was due to mild hydronephrosis and renal  pelvis distention. STOMACH: Unremarkable. SMALL BOWEL: No dilatation or wall thickening. COLON: No dilatation or wall thickening. APPENDIX: Unremarkable. PERITONEUM: Multiple peritoneal implants are redemonstrated with a 16 x 22 mm  lesion anterior to the level of the liver, multiple left omental lesions more  inferiorly measuring up to 2 x 2.4 cm, and multiple implants along the inferior  margin of the right lower liver measuring up to 3.4 x 3.6 cm. Additionally there  is a implant in the right paracolic gutter measuring 2.1 x 2.2 cm. Intrapelvic  masses measure up to 3.5 x 4.0 cm and the rectovaginal space. RETROPERITONEUM: Extensive bulky adenopathy with rocio hepatis adenopathy  measuring up to 5.0 x 6.5 cm and a mass along the tail of the pancreas measuring  proximally 4.0 x 5.2 cm. Aortocaval adenopathy measures up to 3.6 x 5.0 cm. Numerous pelvic adenopathy measures up to 3.3 x 5.2 cm in the left external  iliac chain and 2.7 x 2.0 cm in the right internal iliac chain. REPRODUCTIVE ORGANS: Uterus and ovaries are surgically absent.   URINARY BLADDER: No mass or calculus. BONES: Stable degenerative spine change with endplate sclerosis and  benign-appearing lucencies within the L2 and L4 vertebral bodies, likely  reflecting hemangioma. ADDITIONAL COMMENTS: There is skin thickening in the anterior lower abdominal  wall bilaterally and expected appearance to abdominal postsurgical incisional  change in the midline.     IMPRESSION  IMPRESSION:  1. Left renal hilar fullness is likely due to left renal pelvis distention on  prior exam which has since resolved. 2. Extensive intra-abdominal and nkechi metastatic disease appears grossly stable  as described above. 3. Intra-abdominal wall skin thickening may reflect cellulitis. Correlate with  clinical exam.     Chest CT:  FINDINGS:  LUNGS: There is a rounded patchy area of airspace process in the periphery of  the superior segment of the right lower lobe as well as patchy areas along the  periphery just inferior to it. Similar finding noted in the periphery of the  lingula as well as more centrally in the lingula. These may be areas of acute  airspace process. Heddie [place] PLEURA: There is a chest wall mass which lies along the right side of the  proximal body of the sternum which has pleural base and extends through the  chest wall as well as eroding the adjacent sternum. On axial imaging this mass  is 4.9 x 4.4 cm in size. This may account for the patient's right chest wall  symptoms. TRACHEA/BRONCHI: Patent      The absence of intravenous contrast reduces the sensitivity for evaluation of  the mediastinum and upper abdominal organs.     MEDIASTINUM/SWATI: There is massive adenopathy in the superior mediastinum as  well as adenopathy which extends into the hilar regions bilaterally and in the  subcarinal region. Adenopathy is also identified in the pericardial lymph nodes  on the right. Largest of these measures 2.1 cm.  There is also an enlarged lymph  node along the left posterior margin of the manubrium/body of the sternum which  measures 2.9 cm. .     AORTA:  The aorta has a normal contour with no evidence of aneurysm.     UPPER ABDOMEN: On previous abdominal CT adenopathy was noted in the upper  abdomen. There are retrocrural lymph nodes as well the largest of which on the  left measures 1.4 x 3.7 cm. Previously this lymph node measured 0.8 x 3.1 cm. There is marked adenopathy in the celiac axis extending into the hepatic hilus. This also extends into the area of the falciform ligament. This is more  extensive than on the previous study. There is also perigastric adenopathy on  the left which is now measures approximately 5.3 x 7.4 cm. Previously this was  5.2 x 4.0 cm. .     BONES: As mentioned previously there is lytic erosion along the right side of  the proximal body of the sternum adjacent to the mass. There is a lucency in the  T4 vertebral body.     IMPRESSION  IMPRESSION:    1. There is a chest wall mass in the right parasternal region which invades the  sternum and is seen on both the internal internal as well as external sides of  the chest wall. This may account for the patient's symptoms of right-sided chest  pain. 2. There are patchy areas of airspace process bilaterally which may represent  areas of acute process in the to be correlated clinically. 3. Massive adenopathy in the mediastinum noted. 4. Increasing adenopathy in the retrocrural region and upper abdomen. .          CYTOLOGIC INTERPRETATION:   Chest wall mass in parasternal region, CT-guided needle core biopsy with touch preps:   Metastatic high-grade serous carcinoma (see comment)   General Categorization   Positive for malignancy. Specimen Adequacy   Satisfactory for evaluation. Microscopic sections examined; see final diagnosis. CPT: [id number], [id number], [id number]   Disclaimer   Some of the tests utilized in this interpretation have been developed and performance characteristics determined by "[employer]  Some tests have not been cleared or approved by the [employer] and Drug Administration (FDA). The FDA has determined that such clearance is not necessary. These tests are used for clinical purposes. They should not be regarded as investigational or research. This laboratory is certified under the [address] [id number]) as qualified to perform high complexity clinical laboratory testing. Comment   Tumor is present on touch preps and in both core biopsies (A and B) with demonstrable skeletal muscle invasion. Tumor cells demonstrate the following immunophenotypic profile: Cytokeratin 7+ (strong and diffuse staining), cytokeratin 20 -, and PAX 8+ (strong and diffuse nuclear staining) supporting the above diagnosis. Subjective:   Ms. Ann Carroll is a long time pt of Dr. Corey Salinas. She was initially dx with ovarian cancer in Sept 2012. She had MARAH/BSO/Cytoreductive surgery on 9/21/12. Pathology:   FINAL PATHOLOGIC DIAGNOSIS  1. Omentum, omentectomy:  Serous carcinoma, high grade  2. Soft tissue, umbilicus, biopsy:  Serous carcinoma, high grade  3. Peritoneum, periappendiceal, biopsy:  Serous carcinoma, high grade  4. Colonic mesentery, biopsy:  Serous carcinoma, high grade  See comment  5. Uterus (157 grams), ovaries and fallopian tubes, supracervical hysterectomy and bilateral salpingooophorectomy:  Cervix: No diagnostic pathologic abnormality  Myometrium: No diagnostic pathologic abnormality  Endometrium: Benign polyp and cystic atrophy  Ovaries and fallopian tubes: Serous carcinoma, high grade  Serosa: Serous carcinoma implants, high-grade  See synoptic report  Synoptic Report:  Specimen: Uterus, ovaries and fallopian tubes, omentum  Procedure: Supracervical hysterectomy, bilateral salpingo-oophorectomy, omentectomy  Lymph node sampling: No lymph nodes present  Specimen integrity:  Right ovary: Intact    At that time she was Stage III C. She recovered and began 6 cycles of Carbo/Taxol.   Had good response, but reoccurred and in Fall of 2014     She received 3 cycles of Carbo/Taxol(retreatment) before having a reaction to Carbo and this was stopped. She continued her next 3 cycles with Doxil/Avastin. 3/2015-5/2015. Tolerated poorly with progressive disease documented (CT)  She had a treatment break from 3/2015 until she started with Topatecan for 11 cycles from 7/2015 through 3/2016  . She was followed by Dr. Noemy Munguia and in Jan 2017, she again demonstrated progressive, yet asymptomatic disease via CT scan    She began treatment with Gemzar q21 days on Feb 2nd, six cycles completed 9/6/2017      EOD CT: 9/28/2017  FINDINGS:   LUNG BASES: Dependent atelectasis. Otherwise clear. INCIDENTALLY IMAGED HEART AND MEDIASTINUM: Unremarkable. LIVER: No mass or biliary dilatation. GALLBLADDER: Unremarkable. SPLEEN: No mass. PANCREAS: No mass or ductal dilatation. ADRENALS: Unremarkable. KIDNEYS: Nonobstructing stones measuring 2 to 3 mm in both kidneys with at least  7 right and 3 left stones. Left renal hilar fullness seen on prior unenhanced CT  is no longer evident and I suspect this was due to mild hydronephrosis and renal  pelvis distention. STOMACH: Unremarkable. SMALL BOWEL: No dilatation or wall thickening. COLON: No dilatation or wall thickening. APPENDIX: Unremarkable. PERITONEUM: Multiple peritoneal implants are redemonstrated with a 16 x 22 mm  lesion anterior to the level of the liver, multiple left omental lesions more  inferiorly measuring up to 2 x 2.4 cm, and multiple implants along the inferior  margin of the right lower liver measuring up to 3.4 x 3.6 cm. Additionally there  is a implant in the right paracolic gutter measuring 2.1 x 2.2 cm. Intrapelvic  masses measure up to 3.5 x 4.0 cm and the rectovaginal space.   RETROPERITONEUM: Extensive bulky adenopathy with rocio hepatis adenopathy  measuring up to 5.0 x 6.5 cm and a mass along the tail of the pancreas measuring  proximally 4.0 x 5. 2 cm. Aortocaval adenopathy measures up to 3.6 x 5.0 cm. Numerous pelvic adenopathy measures up to 3.3 x 5.2 cm in the left external  iliac chain and 2.7 x 2.0 cm in the right internal iliac chain. REPRODUCTIVE ORGANS: Uterus and ovaries are surgically absent. URINARY BLADDER: No mass or calculus. BONES: Stable degenerative spine change with endplate sclerosis and  benign-appearing lucencies within the L2 and L4 vertebral bodies, likely  reflecting hemangioma. ADDITIONAL COMMENTS: There is skin thickening in the anterior lower abdominal  wall bilaterally and expected appearance to abdominal postsurgical incisional  change in the midline.     IMPRESSION:  1. Left renal hilar fullness is likely due to left renal pelvis distention on  prior exam which has since resolved. 2. Extensive intra-abdominal and nkechi metastatic disease appears grossly stable  as described above. 3. Intra-abdominal wall skin thickening may reflect cellulitis. Correlate with  clinical exam.      4/16/2018:  Seen today at a one month interval. Active, no restrictions. Recently hospitalized at [facility]--sternal mass biopsied. See above      Component      Latest Ref Rng & Units 12/5/2017 8/28/2017 8/8/2017 7/28/2017          10:26 AM 12:25 PM 10:44 AM 10:01 AM   Cancer Ag (CA) 125      0.0 - 38.1 U/mL  183.8 (H)  157.4 (H)   CA-125      0 - 30 U/mL 141 (H)  160 (H)      Component      Latest Ref Rng & Units 12/26/2017 12/5/2017 8/28/2017 8/8/2017           9:32 AM 10:26 AM 12:25 PM 10:44 AM   BUN      6 - 24 mg/dL 28 (H) 27 (H) 26 (H) 17   Creatinine      0.57 - 1.00 mg/dL 1.18 (H) 1.47 (H) 1.16 (H) 1.03 (H)         Current Outpatient Prescriptions   Medication Sig    LINZESS 145 mcg cap capsule TAKE ONE CAPSULE DAILY    LUBRICANT EYE 57.3-42.5 % oint ophthalmic ointment APPLY TOPICALLY INTO BOTH EYES EVERY DAY FOR 3 MONTHS    levothyroxine (SYNTHROID) 150 mcg tablet TAKE 1 TAB BY MOUTH DAILY (BEFORE BREAKFAST). --CALL [phone] FOR APPOINTMENT FOR MORE REFILLS    Liraglutide (VICTOZA) 0.6 mg/0.1 mL (18 mg/3 mL) pnij 1.8 mg by SubCUTAneous route daily (with lunch).  insulin detemir (LEVEMIR FLEXTOUCH) 100 unit/mL (3 mL) inpn INJECT 30 UNITS IN AM AND 50 UNITS AT NIGHT. INCREASE AS DIRECTED TO  UNITS PER DAY--CALL [phone] FOR APPOINTMENT FOR MORE REFILLS    insulin aspart (NOVOLOG FLEXPEN) 100 unit/mL inpn INJECT 20 UNITS BEFORE EACH MEAL + 4 UNITS FOR EVERY 50 MG/DL ABOVE 150 MG/DL.  UNITS PER DAY    Dexlansoprazole (DEXILANT) 60 mg CpDB Take 1 Cap by mouth daily.  potassium chloride (KLOR-CON M10) 10 mEq tablet TAKE 1 TAB BY MOUTH TWO (2) DAYS A WEEK. WED AND FRI    valsartan (DIOVAN) 80 mg tablet Take 1 Tab by mouth daily.  montelukast (SINGULAIR) 10 mg tablet TAKE 1 TAB BY MOUTH DAILY.  omega-3 acid ethyl esters (LOVAZA) 1 gram capsule TAKE ONE CAPSULE BY MOUTH TWICE A DAY    HYDROcodone-acetaminophen (NORCO) 5-325 mg per tablet Take 1 Tab by mouth every four (4) hours as needed for Pain. Max Daily Amount: 6 Tabs.  aspirin delayed-release 81 mg tablet Take  by mouth daily.  clonazePAM (KLONOPIN) 0.5 mg tablet Take 0.5 mg by mouth nightly as needed.  cycloSPORINE (RESTASIS) 0.05 % ophthalmic emulsion 1 Drop two (2) times a day.  sertraline (ZOLOFT) 50 mg tablet TAKE 1 TABLET BY MOUTH EVERY MORNING    cholecalciferol, VITAMIN D3, (VITAMIN D3) 5,000 unit tab tablet Take 5,000 Units by mouth daily.  furosemide (LASIX) 40 mg tablet Take 80 mg by mouth as needed.  HYDROcodone-acetaminophen (NORCO) 5-325 mg per tablet Take 1 Tab by mouth every four (4) hours as needed for Pain. Max Daily Amount: 6 Tabs.  naloxone (EVZIO) 2 mg/0.4 mL auto-injector 0.4 mL by IntraMUSCular route once as needed for Overdose for up to 1 dose.  SYMBICORT 160-4.5 mcg/actuation HFAA Take 2 Puffs by inhalation two (2) times daily as needed.  diphenhydrAMINE (BENADRYL) 25 mg capsule Take 25 mg by mouth as needed.  albuterol (PROVENTIL VENTOLIN) 2.5 mg /3 mL (0.083 %) nebulizer solution USE 1 VAIL VIA NEBULIZER EVERY 4 HOURS AS NEEDED FOR WHEEZING    ondansetron (ZOFRAN ODT) 8 mg disintegrating tablet Take 1 Tab by mouth every eight (8) hours as needed for Nausea.  BD INSULIN PEN NEEDLE UF SHORT 31 gauge x 5/16\" ndle USE WITH INSULIN TO INJECT FIVE TIMES DAILY.  fluticasone (FLONASE) 50 mcg/actuation nasal spray 2 Sprays by Both Nostrils route daily.  nystatin (MYCOSTATIN) powder Apply  to affected area four (4) times daily as needed.  albuterol (PROVENTIL HFA, VENTOLIN HFA, PROAIR HFA) 90 mcg/actuation inhaler Take 2 Puffs by inhalation every four (4) hours as needed for Wheezing.  fexofenadine (ALLEGRA) 180 mg tablet Take 180 mg by mouth daily. No current facility-administered medications for this visit. Review of Systems:  General: She is in good spirits and her wt is stable. Active, working out, no restrictions. Lynnette Peaks HEENT: Denies visual changes, dysphagia or headache  Resp: Denies SOB, or change in ECKERT, wheezing or cough  CV: Denies CP, palpitations. GI/: Denies N/V, diarrhea, constipation or dysuria   Musk[place]el:Continues with DJD right knee pain. She said she was told she needed a TKR, but has not gotten it yet. Continues to take tylenol for pain when needed and rest leg  Neuro: Denies dizzyness or syncope    Objective:     Patient Vitals for the past 8 hrs:   BP Temp Pulse Resp Height Weight   09/06/17 1424 175/86 - 72 - - -   09/06/17 1107 146/84 97.5 °F (36.4 °C) 96 16 5' 8\" (1.727 m) 311 lb 9.6 oz (141.3 kg)       Physical Exam: Prior office examination. General: A&O X3 in NAD with pleasant affect. HEENT: Sclera anicteric, pupils equal and reactive to light. Mucosa pink, slightly dry again today  Neck: No JVD. No cervical adenopathy appreciated. Chest: Palpable mass on the right upper sternum--5 cm, mildly tender.   Heart: Regular with persistent 2-3/6 murmur  Lungs: CTA Bilat without wheezing or rales. Decreased to bases due to effort, improves with encouragement of taking deep breath   Abd: Soft, obese, NT/ND with + BS throughout. No CVA tenderness noted  Ext: Without edema and + pedal pulses bilat  Neuro: grossly intact    No results found for this or any previous visit (from the past 12 hour(s)). Assessment:     Patient Active Problem List   Diagnosis Code    Histor of ovarian cancer Z85.43    Carcinomatosis ([facility] [address] C80.0    Controlled type 2 diabetes mellitus without complication, with long-term current use of insulin ([facility] [address] E11.9, Z79.4    Morbid obesity ([address] E66.01    Abnormal mammogram R92.8    Sleep apnea G47.30    Dyslipidemia (high LDL; low HDL) E78.5    Benign essential hypertension I10    Disorder of thyroid E07.9    CINV (chemotherapy-induced nausea and vomiting) R11.2, T45.1X5A    Swelling of eye, bilateral--left > right H57.8    Portacath in place--left Z95.828    Chest pain, atypical R07.89    Hx of pulmonary embolus during pregnancy Z86.711, Z87.59    Elevated CA-125 R97.1    Ovarian cancer (HCC) C56.9    Hyperglycemia due to type 2 diabetes mellitus ([address] E11.65    Preop cardiovascular exam Z01.810    Angina pectoris associated with type 2 diabetes mellitus (HCC) E11.59, I20.9    ARF (acute renal failure) (HCC) N17.9    Renal calculi N20.0    Elevated BUN R79.9    Pulmonary hypertension, mild (HCC) I27.20    UTI (urinary tract infection) N39.0    Wheezing R06.2    Lymphadenopathy, mediastinal R59.0    Type 2 diabetes mellitus with nephropathy (HCC) E11.21    Chest mass R22.2    Chest wall mass R22.2         Plan:     All questions answered. Will discuss with practice. Options for systemic chemotherapy  Options for palliative RT--sternum.     Encouraged to call for any concerns or questions  Jenise Araujo MD

## 2018-04-23 ENCOUNTER — OFFICE VISIT (OUTPATIENT)
Dept: GYNECOLOGY | Age: 58
End: 2018-04-23

## 2018-04-23 VITALS — WEIGHT: 293 LBS | HEIGHT: 69 IN | BODY MASS INDEX: 43.4 KG/M2

## 2018-04-23 DIAGNOSIS — E66.01 MORBID OBESITY (HCC): ICD-10-CM

## 2018-04-23 DIAGNOSIS — R22.2 CHEST MASS: ICD-10-CM

## 2018-04-23 DIAGNOSIS — C56.9 OVARIAN CANCER, UNSPECIFIED LATERALITY (HCC): Primary | ICD-10-CM

## 2018-04-23 DIAGNOSIS — C80.0 CARCINOMATOSIS (HCC): ICD-10-CM

## 2018-04-23 NOTE — PROGRESS NOTES
Deondre Rivera. Mary, NP      Date of visit: 4/23/2018       ICD-10-CM ICD-9-CM    1. Ovarian cancer, unspecified laterality ([facility] [address] C56.9 183.0    2. Carcinomatosis ([facility] [address] C80.0 199.0    3. Morbid obesity ([facility] [address] E66.01 278.01    4. Chest mass R22.2 786.6          Subjective:   Ms. Iraida Hartley is a long time pt of Dr. Lyric Crooks. She was initially dx with ovarian cancer in Sept 2012. She had MARAH/BSO/Cytoreductive surgery on 9/21/12. Pathology:   FINAL PATHOLOGIC DIAGNOSIS  1. Omentum, omentectomy:  Serous carcinoma, high grade  2. Soft tissue, umbilicus, biopsy:  Serous carcinoma, high grade  3. Peritoneum, periappendiceal, biopsy:  Serous carcinoma, high grade  4. Colonic mesentery, biopsy:  Serous carcinoma, high grade  See comment  5. Uterus (157 grams), ovaries and fallopian tubes, supracervical hysterectomy and bilateral salpingooophorectomy:  Cervix: No diagnostic pathologic abnormality  Myometrium: No diagnostic pathologic abnormality  Endometrium: Benign polyp and cystic atrophy  Ovaries and fallopian tubes: Serous carcinoma, high grade  Serosa: Serous carcinoma implants, high-grade  See synoptic report  Synoptic Report:  Specimen: Uterus, ovaries and fallopian tubes, omentum  Procedure: Supracervical hysterectomy, bilateral salpingo-oophorectomy, omentectomy  Lymph node sampling: No lymph nodes present  Specimen integrity:  Right ovary: Intact    At that time she was Stage III C. She recovered and began 6 cycles of Carbo/Taxol. Had good response, but reoccurred and in Fall of 2014     She received 3 cycles of Carbo/Taxol(retreatment) before having a reaction to Carbo and this was stopped. She continued her next 3 cycles with Doxil/Avastin. 3/2015-5/2015. Tolerated poorly with progressive disease documented (CT)  She had a treatment break from 3/2015 until she started with Topotecan for 11 cycles from 7/2015 through 3/2016  .    She was followed by Dr. Bruce Bain and in Jan 2017, she again demonstrated progressive, yet asymptomatic disease via CT scan    She began treatment with Gemzar q21 days on Feb 2nd, six cycles completed 9/6/2017      EOD CT: 9/28/2017  FINDINGS:   LUNG BASES: Dependent atelectasis. Otherwise clear. INCIDENTALLY IMAGED HEART AND MEDIASTINUM: Unremarkable. LIVER: No mass or biliary dilatation. GALLBLADDER: Unremarkable. SPLEEN: No mass. PANCREAS: No mass or ductal dilatation. ADRENALS: Unremarkable. KIDNEYS: Nonobstructing stones measuring 2 to 3 mm in both kidneys with at least  7 right and 3 left stones. Left renal hilar fullness seen on prior unenhanced CT  is no longer evident and I suspect this was due to mild hydronephrosis and renal  pelvis distention. STOMACH: Unremarkable. SMALL BOWEL: No dilatation or wall thickening. COLON: No dilatation or wall thickening. APPENDIX: Unremarkable. PERITONEUM: Multiple peritoneal implants are redemonstrated with a 16 x 22 mm  lesion anterior to the level of the liver, multiple left omental lesions more  inferiorly measuring up to 2 x 2.4 cm, and multiple implants along the inferior  margin of the right lower liver measuring up to 3.4 x 3.6 cm. Additionally there  is a implant in the right paracolic gutter measuring 2.1 x 2.2 cm. Intrapelvic  masses measure up to 3.5 x 4.0 cm and the rectovaginal space. RETROPERITONEUM: Extensive bulky adenopathy with rocio hepatis adenopathy  measuring up to 5.0 x 6.5 cm and a mass along the tail of the pancreas measuring  proximally 4.0 x 5.2 cm. Aortocaval adenopathy measures up to 3.6 x 5.0 cm. Numerous pelvic adenopathy measures up to 3.3 x 5.2 cm in the left external  iliac chain and 2.7 x 2.0 cm in the right internal iliac chain. REPRODUCTIVE ORGANS: Uterus and ovaries are surgically absent. URINARY BLADDER: No mass or calculus.   BONES: Stable degenerative spine change with endplate sclerosis and  benign-appearing lucencies within the L2 and L4 vertebral bodies, likely  reflecting hemangioma. ADDITIONAL COMMENTS: There is skin thickening in the anterior lower abdominal  wall bilaterally and expected appearance to abdominal postsurgical incisional  change in the midline.     IMPRESSION:  1. Left renal hilar fullness is likely due to left renal pelvis distention on  prior exam which has since resolved. 2. Extensive intra-abdominal and nkechi metastatic disease appears grossly stable  as described above. 3. Intra-abdominal wall skin thickening may reflect cellulitis. Correlate with  clinical exam.        CT: 4/2018  FINDINGS:   LUNG BASES: Dependent atelectasis. Otherwise clear. INCIDENTALLY IMAGED HEART AND MEDIASTINUM: Unremarkable. LIVER: No mass or biliary dilatation. GALLBLADDER: Unremarkable. SPLEEN: No mass. PANCREAS: No mass or ductal dilatation. ADRENALS: Unremarkable. KIDNEYS: Nonobstructing stones measuring 2 to 3 mm in both kidneys with at least  7 right and 3 left stones. Left renal hilar fullness seen on prior unenhanced CT  is no longer evident and I suspect this was due to mild hydronephrosis and renal  pelvis distention. STOMACH: Unremarkable. SMALL BOWEL: No dilatation or wall thickening. COLON: No dilatation or wall thickening. APPENDIX: Unremarkable. PERITONEUM: Multiple peritoneal implants are redemonstrated with a 16 x 22 mm  lesion anterior to the level of the liver, multiple left omental lesions more  inferiorly measuring up to 2 x 2.4 cm, and multiple implants along the inferior  margin of the right lower liver measuring up to 3.4 x 3.6 cm. Additionally there  is a implant in the right paracolic gutter measuring 2.1 x 2.2 cm. Intrapelvic  masses measure up to 3.5 x 4.0 cm and the rectovaginal space. RETROPERITONEUM: Extensive bulky adenopathy with rocio hepatis adenopathy  measuring up to 5.0 x 6.5 cm and a mass along the tail of the pancreas measuring  proximally 4.0 x 5.2 cm.  Aortocaval adenopathy measures up to 3.6 x 5.0 cm. Numerous pelvic adenopathy measures up to 3.3 x 5.2 cm in the left external  iliac chain and 2.7 x 2.0 cm in the right internal iliac chain. REPRODUCTIVE ORGANS: Uterus and ovaries are surgically absent. URINARY BLADDER: No mass or calculus. BONES: Stable degenerative spine change with endplate sclerosis and  benign-appearing lucencies within the L2 and L4 vertebral bodies, likely  reflecting hemangioma. ADDITIONAL COMMENTS: There is skin thickening in the anterior lower abdominal  wall bilaterally and expected appearance to abdominal postsurgical incisional  change in the midline.     IMPRESSION  IMPRESSION:  1. Left renal hilar fullness is likely due to left renal pelvis distention on  prior exam which has since resolved. 2. Extensive intra-abdominal and nkechi metastatic disease appears grossly stable  as described above. 3. Intra-abdominal wall skin thickening may reflect cellulitis. Correlate with  clinical exam.     Chest CT:  FINDINGS:  LUNGS: There is a rounded patchy area of airspace process in the periphery of  the superior segment of the right lower lobe as well as patchy areas along the  periphery just inferior to it. Similar finding noted in the periphery of the  lingula as well as more centrally in the lingula. These may be areas of acute  airspace process. Alissa Narendra PLEURA: There is a chest wall mass which lies along the right side of the  proximal body of the sternum which has pleural base and extends through the  chest wall as well as eroding the adjacent sternum. On axial imaging this mass  is 4.9 x 4.4 cm in size. This may account for the patient's right chest wall  symptoms.   TRACHEA/BRONCHI: Patent      The absence of intravenous contrast reduces the sensitivity for evaluation of  the mediastinum and upper abdominal organs.     MEDIASTINUM/SWATI: There is massive adenopathy in the superior mediastinum as  well as adenopathy which extends into the hilar regions bilaterally and in the  subcarinal region. Adenopathy is also identified in the pericardial lymph nodes  on the right. Largest of these measures 2.1 cm. There is also an enlarged lymph  node along the left posterior margin of the manubrium/body of the sternum which  measures 2.9 cm. .     AORTA:  The aorta has a normal contour with no evidence of aneurysm.     UPPER ABDOMEN: On previous abdominal CT adenopathy was noted in the upper  abdomen. There are retrocrural lymph nodes as well the largest of which on the  left measures 1.4 x 3.7 cm. Previously this lymph node measured 0.8 x 3.1 cm. There is marked adenopathy in the celiac axis extending into the hepatic hilus. This also extends into the area of the falciform ligament. This is more  extensive than on the previous study. There is also perigastric adenopathy on  the left which is now measures approximately 5.3 x 7.4 cm. Previously this was  5.2 x 4.0 cm. .     BONES: As mentioned previously there is lytic erosion along the right side of  the proximal body of the sternum adjacent to the mass. There is a lucency in the  T4 vertebral body.       IMPRESSION:    1. There is a chest wall mass in the right parasternal region which invades the  sternum and is seen on both the internal internal as well as external sides of  the chest wall. This may account for the patient's symptoms of right-sided chest  pain. 2. There are patchy areas of airspace process bilaterally which may represent  areas of acute process in the to be correlated clinically. 3. Massive adenopathy in the mediastinum noted. 4. Increasing adenopathy in the retrocrural region and upper abdomen. .        CYTOLOGIC INTERPRETATION:   Chest wall mass in parasternal region, CT-guided needle core biopsy with touch preps:   Metastatic high-grade serous carcinoma (see comment)   General Categorization   Positive for malignancy. Specimen Adequacy   Satisfactory for evaluation.    Microscopic sections examined; see final diagnosis. CPT: [id number], [id number], [id number]   Disclaimer   Some of the tests utilized in this interpretation have been developed and performance characteristics determined by [facility]. Some tests have not been cleared or approved by the [employer] and Drug Administration (FDA). The FDA has determined that such clearance is not necessary. These tests are used for clinical purposes. They should not be regarded as investigational or research. This laboratory is certified under the [address] [id number]) as qualified to perform high complexity clinical laboratory testing. Comment   Tumor is present on touch preps and in both core biopsies (A and B) with demonstrable skeletal muscle invasion. Tumor cells demonstrate the following immunophenotypic profile: Cytokeratin 7+ (strong and diffuse staining), cytokeratin 20 -, and PAX 8+ (strong and diffuse nuclear staining) supporting the above diagnosis. 4/23/2018:  Seen today at a one month interval. Active, no restrictions. Recently hospitalized at [facility]--sternal mass biopsied. See above      Component      Latest Ref Rng & Units 12/5/2017 8/28/2017 8/8/2017 7/28/2017          10:26 AM 12:25 PM 10:44 AM 10:01 AM   Cancer Ag (CA) 125      0.0 - 38.1 U/mL  183.8 (H)  157.4 (H)   CA-125      0 - 30 U/mL 141 (H)  160 (H)      Component      Latest Ref Rng & Units 12/26/2017 12/5/2017 8/28/2017 8/8/2017           9:32 AM 10:26 AM 12:25 PM 10:44 AM   BUN      6 - 24 mg/dL 28 (H) 27 (H) 26 (H) 17   Creatinine      0.57 - 1.00 mg/dL 1.18 (H) 1.47 (H) 1.16 (H) 1.03 (H)         Current Outpatient Prescriptions   Medication Sig    LINZESS 145 mcg cap capsule TAKE ONE CAPSULE DAILY    LUBRICANT EYE 57.3-42.5 % oint ophthalmic ointment APPLY TOPICALLY INTO BOTH EYES EVERY DAY FOR 3 MONTHS    levothyroxine (SYNTHROID) 150 mcg tablet TAKE 1 TAB BY MOUTH DAILY (BEFORE BREAKFAST). --CALL [phone] FOR APPOINTMENT FOR MORE REFILLS    Liraglutide (VICTOZA) 0.6 mg/0.1 mL (18 mg/3 mL) pnij 1.8 mg by SubCUTAneous route daily (with lunch).  insulin detemir (LEVEMIR FLEXTOUCH) 100 unit/mL (3 mL) inpn INJECT 30 UNITS IN AM AND 50 UNITS AT NIGHT. INCREASE AS DIRECTED TO  UNITS PER DAY--CALL [phone] FOR APPOINTMENT FOR MORE REFILLS    insulin aspart (NOVOLOG FLEXPEN) 100 unit/mL inpn INJECT 20 UNITS BEFORE EACH MEAL + 4 UNITS FOR EVERY 50 MG/DL ABOVE 150 MG/DL.  UNITS PER DAY    Dexlansoprazole (DEXILANT) 60 mg CpDB Take 1 Cap by mouth daily.  potassium chloride (KLOR-CON M10) 10 mEq tablet TAKE 1 TAB BY MOUTH TWO (2) DAYS A WEEK. WED AND FRI    montelukast (SINGULAIR) 10 mg tablet TAKE 1 TAB BY MOUTH DAILY.  omega-3 acid ethyl esters (LOVAZA) 1 gram capsule TAKE ONE CAPSULE BY MOUTH TWICE A DAY    aspirin delayed-release 81 mg tablet Take  by mouth daily.  clonazePAM (KLONOPIN) 0.5 mg tablet Take 0.5 mg by mouth nightly as needed.  cycloSPORINE (RESTASIS) 0.05 % ophthalmic emulsion 1 Drop two (2) times a day.  sertraline (ZOLOFT) 50 mg tablet TAKE 1 TABLET BY MOUTH EVERY MORNING    cholecalciferol, VITAMIN D3, (VITAMIN D3) 5,000 unit tab tablet Take 5,000 Units by mouth daily.  furosemide (LASIX) 40 mg tablet Take 80 mg by mouth as needed.  HYDROcodone-acetaminophen (NORCO) 5-325 mg per tablet Take 1 Tab by mouth every four (4) hours as needed for Pain. Max Daily Amount: 6 Tabs.  naloxone (EVZIO) 2 mg/0.4 mL auto-injector 0.4 mL by IntraMUSCular route once as needed for Overdose for up to 1 dose.  SYMBICORT 160-4.5 mcg/actuation HFAA Take 2 Puffs by inhalation two (2) times daily as needed.  valsartan (DIOVAN) 80 mg tablet Take 1 Tab by mouth daily.  diphenhydrAMINE (BENADRYL) 25 mg capsule Take 25 mg by mouth as needed.  HYDROcodone-acetaminophen (NORCO) 5-325 mg per tablet Take 1 Tab by mouth every four (4) hours as needed for Pain. Max Daily Amount: 6 Tabs.  albuterol (PROVENTIL VENTOLIN) 2.5 mg /3 mL (0.083 %) nebulizer solution USE 1 VAIL VIA NEBULIZER EVERY 4 HOURS AS NEEDED FOR WHEEZING    ondansetron (ZOFRAN ODT) 8 mg disintegrating tablet Take 1 Tab by mouth every eight (8) hours as needed for Nausea.  BD INSULIN PEN NEEDLE UF SHORT 31 gauge x 5/16\" ndle USE WITH INSULIN TO INJECT FIVE TIMES DAILY.  fluticasone (FLONASE) 50 mcg/actuation nasal spray 2 Sprays by Both Nostrils route daily.  nystatin (MYCOSTATIN) powder Apply  to affected area four (4) times daily as needed.  albuterol (PROVENTIL HFA, VENTOLIN HFA, PROAIR HFA) 90 mcg/actuation inhaler Take 2 Puffs by inhalation every four (4) hours as needed for Wheezing.  fexofenadine (ALLEGRA) 180 mg tablet Take 180 mg by mouth daily. No current facility-administered medications for this visit. Review of Systems:  General: She is in good spirits and her wt is stable. Active, working out, no restrictions. Xiomara [place] HEENT: Denies visual changes, dysphagia or headache  Resp: Denies SOB, or change in ECKERT, wheezing or cough  CV: Denies CP, palpitations. GI/: Denies N/V, diarrhea, constipation or dysuria   MuskSkel:Continues with DJD right knee pain. She said she was told she needed a TKR, but has not gotten it yet. Continues to take tylenol for pain when needed and rest leg  Neuro: Denies dizzyness or syncope    Objective:     Patient Vitals for the past 8 hrs:   BP Temp Pulse Resp Height Weight   09/06/17 1424 175/86 - 72 - - -   09/06/17 1107 146/84 97.5 °F (36.4 °C) 96 16 5' 8\" (1.727 m) 311 lb 9.6 oz (141.3 kg)       Physical Exam: Prior office examination. General: A&O X3 in NAD with pleasant affect. HEENT: Sclera anicteric, pupils equal and reactive to light. Mucosa pink, slightly dry again today  Neck: No JVD. No cervical adenopathy appreciated. Chest: Palpable mass on the right upper sternum--5 cm, mildly tender.   Heart: Regular with persistent 2-3/6 murmur  Lungs: CTA Bilat without wheezing or rales. Decreased to bases due to effort, improves with encouragement of taking deep breath   Abd: Soft, obese, NT/ND with + BS throughout. No CVA tenderness noted  Ext: Without edema and + pedal pulses bilat  Neuro: grossly intact    No results found for this or any previous visit (from the past 12 hour(s)). Assessment:     Patient Active Problem List   Diagnosis Code    Histor of ovarian cancer Z85.43    Carcinomatosis ([facility] [address] C80.0    Controlled type 2 diabetes mellitus without complication, with long-term current use of insulin ([address] E11.9, Z79.4    Morbid obesity ([address] E66.01    Abnormal mammogram R92.8    Sleep apnea G47.30    Dyslipidemia (high LDL; low HDL) E78.5    Benign essential hypertension I10    Disorder of thyroid E07.9    CINV (chemotherapy-induced nausea and vomiting) R11.2, T45.1X5A    Swelling of eye, bilateral--left > right H57.8    Portacath in place--left Z95.828    Chest pain, atypical R07.89    Hx of pulmonary embolus during pregnancy Z86.711, Z87.59    Elevated CA-125 R97.1    Ovarian cancer (HCC) C56.9    Hyperglycemia due to type 2 diabetes mellitus ([address] E11.65    Preop cardiovascular exam Z01.810    Angina pectoris associated with type 2 diabetes mellitus (HCC) E11.59, I20.9    ARF (acute renal failure) (HCC) N17.9    Renal calculi N20.0    Elevated BUN R79.9    Pulmonary hypertension, mild (HCC) I27.20    UTI (urinary tract infection) N39.0    Wheezing R06.2    Lymphadenopathy, mediastinal R59.0    Type 2 diabetes mellitus with nephropathy (HCC) E11.21    Chest mass R22.2    Chest wall mass R22.2         Plan:     The patient desires further systemic chemotherapy with the clear understanding of the limited potential for response or lasting PFI. Palliative nature stressed. Options discussed.   Prolonged Taxol free interval lends itself to a recommendation of weekly Taxol  Eligibility and efficacy of Parp inhibitor, in a BRCA negative patient, precludes recommendations  Clinical trial consultation offered but in view of CCA evaluation I would agree there is no anticipated value in Phase I studies. The patient has elected to proceed with IV Taxol. Rationale/ risks noted  Neew for IV access know. Appointments to be scheduled.     Encouraged to call for any concerns or questions  Darrell Carroll MD

## 2018-04-24 DIAGNOSIS — C56.9 MALIGNANT NEOPLASM OF OVARY, UNSPECIFIED LATERALITY (HCC): Primary | ICD-10-CM

## 2018-04-24 RX ORDER — LIDOCAINE AND PRILOCAINE 25; 25 MG/G; MG/G
CREAM TOPICAL
Qty: 30 G | Refills: 1 | Status: SHIPPED | OUTPATIENT
Start: 2018-04-24 | End: 2019-03-01

## 2018-04-24 NOTE — TELEPHONE ENCOUNTER
Requested Prescriptions     Signed Prescriptions Disp Refills    lidocaine-prilocaine (EMLA) topical cream 30 g 1     Sig: Apply small amount over port area one hour before chemo treatment and cover with a Band-Aid     Authorizing Provider: Dolores Gordillo     Ordering User: Praful Scripture     Rx sent to pharmacy to be used with her port a cath during chemotherapy per vo Dr. Willie Mercer.

## 2018-04-25 ENCOUNTER — TELEPHONE (OUTPATIENT)
Dept: GYNECOLOGY | Age: 58
End: 2018-04-25

## 2018-04-25 ENCOUNTER — DOCUMENTATION ONLY (OUTPATIENT)
Dept: GYNECOLOGY | Age: 58
End: 2018-04-25

## 2018-04-25 NOTE — PROGRESS NOTES
Lab req faxed to [email or web address] on [address]. Pt will have labs drawn on 5/3 to be used for C1W1 chemotherapy on 5/8/18. Appointments for lab/MD/chemo for 3 cycles given to pt. Emla cream sent to pharmacy, pt has refills on her Zofran. Orders completed and scanned into CC, and Girish Meza was alerted.

## 2018-04-25 NOTE — TELEPHONE ENCOUNTER
Lab req faxed to [facility] at [place], and pt will have labs drawn on 5/2/18 for C1W1 chemotherapy on 5/8/18. Lab/Md/chemo appointments through 7/24/18 given to pt.

## 2018-05-04 ENCOUNTER — ANESTHESIA EVENT (OUTPATIENT)
Dept: SURGERY | Age: 58
End: 2018-05-04
Payer: MEDICARE

## 2018-05-04 ENCOUNTER — HOSPITAL ENCOUNTER (OUTPATIENT)
Age: 58
Setting detail: OUTPATIENT SURGERY
Discharge: HOME | End: 2018-05-04
Attending: OBSTETRICS & GYNECOLOGY | Admitting: OBSTETRICS & GYNECOLOGY
Payer: MEDICARE

## 2018-05-04 ENCOUNTER — ANESTHESIA (OUTPATIENT)
Dept: SURGERY | Age: 58
End: 2018-05-04
Payer: MEDICARE

## 2018-05-04 ENCOUNTER — APPOINTMENT (OUTPATIENT)
Dept: GENERAL RADIOLOGY | Age: 58
End: 2018-05-04
Attending: OBSTETRICS & GYNECOLOGY
Payer: MEDICARE

## 2018-05-04 VITALS
TEMPERATURE: 97.8 F | DIASTOLIC BLOOD PRESSURE: 76 MMHG | WEIGHT: 293 LBS | RESPIRATION RATE: 12 BRPM | BODY MASS INDEX: 43.4 KG/M2 | SYSTOLIC BLOOD PRESSURE: 118 MMHG | OXYGEN SATURATION: 93 % | HEART RATE: 66 BPM | HEIGHT: 69 IN

## 2018-05-04 DIAGNOSIS — C80.0 CARCINOMATOSIS (HCC): Primary | ICD-10-CM

## 2018-05-04 LAB
ALBUMIN SERPL-MCNC: 4.1 G/DL (ref 3.5–5.5)
ALBUMIN/GLOB SERPL: 0.9 {RATIO} (ref 1.2–2.2)
ALP SERPL-CCNC: 73 IU/L (ref 39–117)
ALT SERPL-CCNC: 16 IU/L (ref 0–32)
AST SERPL-CCNC: 29 IU/L (ref 0–40)
BASOPHILS # BLD AUTO: 0 X10E3/UL (ref 0–0.2)
BASOPHILS NFR BLD AUTO: 1 %
BILIRUB SERPL-MCNC: 0.4 MG/DL (ref 0–1.2)
BUN SERPL-MCNC: 28 MG/DL (ref 6–24)
BUN/CREAT SERPL: 21 (ref 9–23)
CALCIUM SERPL-MCNC: 9.6 MG/DL (ref 8.7–10.2)
CANCER AG125 SERPL-ACNC: 226.1 U/ML (ref 0–38.1)
CHLORIDE SERPL-SCNC: 105 MMOL/L (ref 96–106)
CO2 SERPL-SCNC: 21 MMOL/L (ref 18–29)
CREAT SERPL-MCNC: 1.34 MG/DL (ref 0.57–1)
EOSINOPHIL # BLD AUTO: 0.3 X10E3/UL (ref 0–0.4)
EOSINOPHIL NFR BLD AUTO: 4 %
ERYTHROCYTE [DISTWIDTH] IN BLOOD BY AUTOMATED COUNT: 13.9 % (ref 12.3–15.4)
GFR SERPLBLD CREATININE-BSD FMLA CKD-EPI: 44 ML/MIN/1.73
GFR SERPLBLD CREATININE-BSD FMLA CKD-EPI: 51 ML/MIN/1.73
GLOBULIN SER CALC-MCNC: 4.5 G/DL (ref 1.5–4.5)
GLUCOSE BLD STRIP.AUTO-MCNC: 106 MG/DL (ref 65–100)
GLUCOSE SERPL-MCNC: 120 MG/DL (ref 65–99)
HCT VFR BLD AUTO: 29.1 % (ref 34–46.6)
HGB BLD-MCNC: 9.2 G/DL (ref 11.1–15.9)
IMM GRANULOCYTES # BLD: 0 X10E3/UL (ref 0–0.1)
IMM GRANULOCYTES NFR BLD: 0 %
LYMPHOCYTES # BLD AUTO: 2.1 X10E3/UL (ref 0.7–3.1)
LYMPHOCYTES NFR BLD AUTO: 24 %
MAGNESIUM SERPL-MCNC: 1.7 MG/DL (ref 1.6–2.3)
MCH RBC QN AUTO: 27.7 PG (ref 26.6–33)
MCHC RBC AUTO-ENTMCNC: 31.6 G/DL (ref 31.5–35.7)
MCV RBC AUTO: 88 FL (ref 79–97)
MONOCYTES # BLD AUTO: 0.7 X10E3/UL (ref 0.1–0.9)
MONOCYTES NFR BLD AUTO: 8 %
NEUTROPHILS # BLD AUTO: 5.7 X10E3/UL (ref 1.4–7)
NEUTROPHILS NFR BLD AUTO: 63 %
PLATELET # BLD AUTO: 217 X10E3/UL (ref 150–379)
POTASSIUM SERPL-SCNC: 4.7 MMOL/L (ref 3.5–5.2)
PROT SERPL-MCNC: 8.6 G/DL (ref 6–8.5)
RBC # BLD AUTO: 3.32 X10E6/UL (ref 3.77–5.28)
SERVICE CMNT-IMP: ABNORMAL
SODIUM SERPL-SCNC: 140 MMOL/L (ref 134–144)
WBC # BLD AUTO: 8.8 X10E3/UL (ref 3.4–10.8)

## 2018-05-04 PROCEDURE — 74011000250 HC RX REV CODE- 250: Performed by: OBSTETRICS & GYNECOLOGY

## 2018-05-04 PROCEDURE — 77030020256 HC SOL INJ NACL 0.9%  500ML: Performed by: OBSTETRICS & GYNECOLOGY

## 2018-05-04 PROCEDURE — 76000 FLUOROSCOPY <1 HR PHYS/QHP: CPT

## 2018-05-04 PROCEDURE — 74011250636 HC RX REV CODE- 250/636

## 2018-05-04 PROCEDURE — 76060000032 HC ANESTHESIA 0.5 TO 1 HR: Performed by: OBSTETRICS & GYNECOLOGY

## 2018-05-04 PROCEDURE — 74011000258 HC RX REV CODE- 258: Performed by: OBSTETRICS & GYNECOLOGY

## 2018-05-04 PROCEDURE — 76210000021 HC REC RM PH II 0.5 TO 1 HR: Performed by: OBSTETRICS & GYNECOLOGY

## 2018-05-04 PROCEDURE — 76010000138 HC OR TIME 0.5 TO 1 HR: Performed by: OBSTETRICS & GYNECOLOGY

## 2018-05-04 PROCEDURE — 74011250636 HC RX REV CODE- 250/636: Performed by: OBSTETRICS & GYNECOLOGY

## 2018-05-04 PROCEDURE — C1788 PORT, INDWELLING, IMP: HCPCS | Performed by: OBSTETRICS & GYNECOLOGY

## 2018-05-04 PROCEDURE — 82962 GLUCOSE BLOOD TEST: CPT

## 2018-05-04 PROCEDURE — 71045 X-RAY EXAM CHEST 1 VIEW: CPT

## 2018-05-04 PROCEDURE — 74011250636 HC RX REV CODE- 250/636: Performed by: ANESTHESIOLOGY

## 2018-05-04 PROCEDURE — 77030011640 HC PAD GRND REM COVD -A: Performed by: OBSTETRICS & GYNECOLOGY

## 2018-05-04 PROCEDURE — 74011000250 HC RX REV CODE- 250

## 2018-05-04 PROCEDURE — 76210000016 HC OR PH I REC 1 TO 1.5 HR: Performed by: OBSTETRICS & GYNECOLOGY

## 2018-05-04 PROCEDURE — 77030002986 HC SUT PROL J&J -A: Performed by: OBSTETRICS & GYNECOLOGY

## 2018-05-04 DEVICE — SYSTEM INFUS PRT CATH 8FR L66CM INTRO 8FR CHST TI SGL LUMN: Type: IMPLANTABLE DEVICE | Site: CHEST | Status: FUNCTIONAL

## 2018-05-04 RX ORDER — ONDANSETRON 2 MG/ML
4 INJECTION INTRAMUSCULAR; INTRAVENOUS AS NEEDED
Status: DISCONTINUED | OUTPATIENT
Start: 2018-05-04 | End: 2018-05-04 | Stop reason: HOSPADM

## 2018-05-04 RX ORDER — MIDAZOLAM HYDROCHLORIDE 1 MG/ML
1 INJECTION, SOLUTION INTRAMUSCULAR; INTRAVENOUS AS NEEDED
Status: DISCONTINUED | OUTPATIENT
Start: 2018-05-04 | End: 2018-05-04 | Stop reason: HOSPADM

## 2018-05-04 RX ORDER — LIDOCAINE HYDROCHLORIDE 10 MG/ML
0.1 INJECTION, SOLUTION EPIDURAL; INFILTRATION; INTRACAUDAL; PERINEURAL AS NEEDED
Status: DISCONTINUED | OUTPATIENT
Start: 2018-05-04 | End: 2018-05-04 | Stop reason: HOSPADM

## 2018-05-04 RX ORDER — MIDAZOLAM HYDROCHLORIDE 1 MG/ML
INJECTION, SOLUTION INTRAMUSCULAR; INTRAVENOUS AS NEEDED
Status: DISCONTINUED | OUTPATIENT
Start: 2018-05-04 | End: 2018-05-04 | Stop reason: HOSPADM

## 2018-05-04 RX ORDER — SODIUM CHLORIDE, SODIUM LACTATE, POTASSIUM CHLORIDE, CALCIUM CHLORIDE 600; 310; 30; 20 MG/100ML; MG/100ML; MG/100ML; MG/100ML
100 INJECTION, SOLUTION INTRAVENOUS CONTINUOUS
Status: DISCONTINUED | OUTPATIENT
Start: 2018-05-04 | End: 2018-05-04 | Stop reason: HOSPADM

## 2018-05-04 RX ORDER — HYDROCODONE BITARTRATE AND ACETAMINOPHEN 5; 325 MG/1; MG/1
1 TABLET ORAL
Qty: 20 TAB | Refills: 0 | Status: SHIPPED | OUTPATIENT
Start: 2018-05-04 | End: 2018-05-31 | Stop reason: SDUPTHER

## 2018-05-04 RX ORDER — FENTANYL CITRATE 50 UG/ML
25 INJECTION, SOLUTION INTRAMUSCULAR; INTRAVENOUS
Status: COMPLETED | OUTPATIENT
Start: 2018-05-04 | End: 2018-05-04

## 2018-05-04 RX ORDER — SODIUM CHLORIDE 0.9 % (FLUSH) 0.9 %
5-10 SYRINGE (ML) INJECTION AS NEEDED
Status: DISCONTINUED | OUTPATIENT
Start: 2018-05-04 | End: 2018-05-04 | Stop reason: HOSPADM

## 2018-05-04 RX ORDER — SODIUM CHLORIDE 0.9 % (FLUSH) 0.9 %
5-10 SYRINGE (ML) INJECTION EVERY 8 HOURS
Status: DISCONTINUED | OUTPATIENT
Start: 2018-05-04 | End: 2018-05-04 | Stop reason: HOSPADM

## 2018-05-04 RX ORDER — PROPOFOL 10 MG/ML
INJECTION, EMULSION INTRAVENOUS
Status: DISCONTINUED | OUTPATIENT
Start: 2018-05-04 | End: 2018-05-04 | Stop reason: HOSPADM

## 2018-05-04 RX ORDER — MIDAZOLAM HYDROCHLORIDE 1 MG/ML
0.5 INJECTION, SOLUTION INTRAMUSCULAR; INTRAVENOUS
Status: DISCONTINUED | OUTPATIENT
Start: 2018-05-04 | End: 2018-05-04 | Stop reason: HOSPADM

## 2018-05-04 RX ORDER — SODIUM CHLORIDE 9 MG/ML
25 INJECTION, SOLUTION INTRAVENOUS CONTINUOUS
Status: DISCONTINUED | OUTPATIENT
Start: 2018-05-04 | End: 2018-05-04 | Stop reason: HOSPADM

## 2018-05-04 RX ORDER — SODIUM CHLORIDE, SODIUM LACTATE, POTASSIUM CHLORIDE, CALCIUM CHLORIDE 600; 310; 30; 20 MG/100ML; MG/100ML; MG/100ML; MG/100ML
1000 INJECTION, SOLUTION INTRAVENOUS CONTINUOUS
Status: DISCONTINUED | OUTPATIENT
Start: 2018-05-04 | End: 2018-05-04 | Stop reason: HOSPADM

## 2018-05-04 RX ORDER — FENTANYL CITRATE 50 UG/ML
INJECTION, SOLUTION INTRAMUSCULAR; INTRAVENOUS AS NEEDED
Status: DISCONTINUED | OUTPATIENT
Start: 2018-05-04 | End: 2018-05-04 | Stop reason: HOSPADM

## 2018-05-04 RX ORDER — OXYCODONE HYDROCHLORIDE 5 MG/1
5 TABLET ORAL AS NEEDED
Status: DISCONTINUED | OUTPATIENT
Start: 2018-05-04 | End: 2018-05-04 | Stop reason: HOSPADM

## 2018-05-04 RX ORDER — ROPIVACAINE HYDROCHLORIDE 5 MG/ML
150 INJECTION, SOLUTION EPIDURAL; INFILTRATION; PERINEURAL AS NEEDED
Status: DISCONTINUED | OUTPATIENT
Start: 2018-05-04 | End: 2018-05-04 | Stop reason: HOSPADM

## 2018-05-04 RX ORDER — FENTANYL CITRATE 50 UG/ML
50 INJECTION, SOLUTION INTRAMUSCULAR; INTRAVENOUS AS NEEDED
Status: DISCONTINUED | OUTPATIENT
Start: 2018-05-04 | End: 2018-05-04 | Stop reason: HOSPADM

## 2018-05-04 RX ORDER — DIPHENHYDRAMINE HYDROCHLORIDE 50 MG/ML
12.5 INJECTION, SOLUTION INTRAMUSCULAR; INTRAVENOUS AS NEEDED
Status: DISCONTINUED | OUTPATIENT
Start: 2018-05-04 | End: 2018-05-04 | Stop reason: HOSPADM

## 2018-05-04 RX ORDER — DIPHENHYDRAMINE HYDROCHLORIDE 50 MG/ML
50 INJECTION, SOLUTION INTRAMUSCULAR; INTRAVENOUS ONCE
Status: COMPLETED | OUTPATIENT
Start: 2018-05-08 | End: 2018-05-08

## 2018-05-04 RX ORDER — DEXAMETHASONE SODIUM PHOSPHATE 4 MG/ML
10 INJECTION, SOLUTION INTRA-ARTICULAR; INTRALESIONAL; INTRAMUSCULAR; INTRAVENOUS; SOFT TISSUE ONCE
Status: COMPLETED | OUTPATIENT
Start: 2018-05-08 | End: 2018-05-08

## 2018-05-04 RX ORDER — LIDOCAINE HYDROCHLORIDE 20 MG/ML
INJECTION, SOLUTION EPIDURAL; INFILTRATION; INTRACAUDAL; PERINEURAL AS NEEDED
Status: DISCONTINUED | OUTPATIENT
Start: 2018-05-04 | End: 2018-05-04 | Stop reason: HOSPADM

## 2018-05-04 RX ORDER — PROPOFOL 10 MG/ML
INJECTION, EMULSION INTRAVENOUS AS NEEDED
Status: DISCONTINUED | OUTPATIENT
Start: 2018-05-04 | End: 2018-05-04 | Stop reason: HOSPADM

## 2018-05-04 RX ADMIN — MIDAZOLAM HYDROCHLORIDE 1 MG: 1 INJECTION, SOLUTION INTRAMUSCULAR; INTRAVENOUS at 07:37

## 2018-05-04 RX ADMIN — ONDANSETRON 4 MG: 2 INJECTION INTRAMUSCULAR; INTRAVENOUS at 09:24

## 2018-05-04 RX ADMIN — MIDAZOLAM HYDROCHLORIDE 2 MG: 1 INJECTION, SOLUTION INTRAMUSCULAR; INTRAVENOUS at 07:26

## 2018-05-04 RX ADMIN — FENTANYL CITRATE 25 MCG: 50 INJECTION, SOLUTION INTRAMUSCULAR; INTRAVENOUS at 09:00

## 2018-05-04 RX ADMIN — FENTANYL CITRATE 25 MCG: 50 INJECTION, SOLUTION INTRAMUSCULAR; INTRAVENOUS at 07:45

## 2018-05-04 RX ADMIN — FENTANYL CITRATE 25 MCG: 50 INJECTION, SOLUTION INTRAMUSCULAR; INTRAVENOUS at 08:47

## 2018-05-04 RX ADMIN — CEFOTETAN DISODIUM 2 G: 2 INJECTION, POWDER, FOR SOLUTION INTRAMUSCULAR; INTRAVENOUS at 07:36

## 2018-05-04 RX ADMIN — FENTANYL CITRATE 25 MCG: 50 INJECTION, SOLUTION INTRAMUSCULAR; INTRAVENOUS at 07:37

## 2018-05-04 RX ADMIN — FENTANYL CITRATE 50 MCG: 50 INJECTION, SOLUTION INTRAMUSCULAR; INTRAVENOUS at 07:26

## 2018-05-04 RX ADMIN — FENTANYL CITRATE 25 MCG: 50 INJECTION, SOLUTION INTRAMUSCULAR; INTRAVENOUS at 09:09

## 2018-05-04 RX ADMIN — FENTANYL CITRATE 25 MCG: 50 INJECTION, SOLUTION INTRAMUSCULAR; INTRAVENOUS at 08:55

## 2018-05-04 RX ADMIN — PROPOFOL 50 MCG/KG/MIN: 10 INJECTION, EMULSION INTRAVENOUS at 07:32

## 2018-05-04 RX ADMIN — LIDOCAINE HYDROCHLORIDE 40 MG: 20 INJECTION, SOLUTION EPIDURAL; INFILTRATION; INTRACAUDAL; PERINEURAL at 07:32

## 2018-05-04 RX ADMIN — MIDAZOLAM HYDROCHLORIDE 1 MG: 1 INJECTION, SOLUTION INTRAMUSCULAR; INTRAVENOUS at 07:45

## 2018-05-04 RX ADMIN — PROPOFOL 20 MG: 10 INJECTION, EMULSION INTRAVENOUS at 07:32

## 2018-05-04 RX ADMIN — SODIUM CHLORIDE, SODIUM LACTATE, POTASSIUM CHLORIDE, AND CALCIUM CHLORIDE 1000 ML: 600; 310; 30; 20 INJECTION, SOLUTION INTRAVENOUS at 06:54

## 2018-05-04 NOTE — DISCHARGE INSTRUCTIONS
[address] [place] [address] Mathias Moritz [address], [address]  P [phone]  F [phone]      Patient Discharge Instructions    Shruthi Meza / [id number] : 1960    Admitted 2018 Discharged: 2018     Take Home Medications     No current facility-administered medications on file prior to encounter. Current Outpatient Prescriptions on File Prior to Encounter   Medication Sig Dispense Refill    LINZESS 145 mcg cap capsule TAKE ONE CAPSULE DAILY  0    LUBRICANT EYE 57.3-42.5 % oint ophthalmic ointment APPLY TOPICALLY INTO BOTH EYES EVERY DAY FOR 3 MONTHS  3    levothyroxine (SYNTHROID) 150 mcg tablet TAKE 1 TAB BY MOUTH DAILY (BEFORE BREAKFAST). --TQGU [phone] FOR APPOINTMENT FOR MORE REFILLS 90 Tab 0    Liraglutide (VICTOZA) 0.6 mg/0.1 mL (18 mg/3 mL) pnij 1.8 mg by SubCUTAneous route daily (with lunch). 3 Pen 3    insulin detemir (LEVEMIR FLEXTOUCH) 100 unit/mL (3 mL) inpn INJECT 30 UNITS IN AM AND 50 UNITS AT NIGHT. INCREASE AS DIRECTED TO  UNITS PER DAY--CALL [phone] FOR APPOINTMENT FOR MORE REFILLS 30 mL 3    insulin aspart (NOVOLOG FLEXPEN) 100 unit/mL inpn INJECT 20 UNITS BEFORE EACH MEAL + 4 UNITS FOR EVERY 50 MG/DL ABOVE 150 MG/DL.  UNITS PER DAY 30 mL 11    potassium chloride (KLOR-CON M10) 10 mEq tablet TAKE 1 TAB BY MOUTH TWO (2) DAYS A WEEK. WED AND FRI 90 Tab 1    SYMBICORT 160-4.5 mcg/actuation HFAA Take 2 Puffs by inhalation two (2) times daily as needed. 2 Inhaler 3    valsartan (DIOVAN) 80 mg tablet Take 1 Tab by mouth daily. 30 Tab 5    montelukast (SINGULAIR) 10 mg tablet TAKE 1 TAB BY MOUTH DAILY. 90 Tab 1    aspirin delayed-release 81 mg tablet Take  by mouth daily.  fluticasone (FLONASE) 50 mcg/actuation nasal spray 2 Sprays by Both Nostrils route daily.  cycloSPORINE (RESTASIS) 0.05 % ophthalmic emulsion 1 Drop two (2) times a day.       cholecalciferol, VITAMIN D3, (VITAMIN D3) 5,000 unit tab tablet Take 5,000 Units by mouth daily.  fexofenadine (ALLEGRA) 180 mg tablet Take 180 mg by mouth daily.  HYDROcodone-acetaminophen (NORCO) 5-325 mg per tablet Take 1 Tab by mouth every four (4) hours as needed for Pain. Max Daily Amount: 6 Tabs. 30 Tab 0    naloxone (EVZIO) 2 mg/0.4 mL auto-injector 0.4 mL by IntraMUSCular route once as needed for Overdose for up to 1 dose. 1 Device 1    Dexlansoprazole (DEXILANT) 60 mg CpDB Take 1 Cap by mouth daily. 30 Cap 11    omega-3 acid ethyl esters (LOVAZA) 1 gram capsule TAKE ONE CAPSULE BY MOUTH TWICE A  Cap 2    diphenhydrAMINE (BENADRYL) 25 mg capsule Take 25 mg by mouth as needed.  albuterol (PROVENTIL VENTOLIN) 2.5 mg /3 mL (0.083 %) nebulizer solution USE 1 VAIL VIA NEBULIZER EVERY 4 HOURS AS NEEDED FOR WHEEZING 3 Package 1    ondansetron (ZOFRAN ODT) 8 mg disintegrating tablet Take 1 Tab by mouth every eight (8) hours as needed for Nausea. 30 Tab 3    BD INSULIN PEN NEEDLE UF SHORT 31 gauge x 5/16\" ndle USE WITH INSULIN TO INJECT FIVE TIMES DAILY. 200 Pen Needle 11    clonazePAM (KLONOPIN) 0.5 mg tablet Take 0.5 mg by mouth nightly as needed.  nystatin (MYCOSTATIN) powder Apply  to affected area four (4) times daily as needed.  sertraline (ZOLOFT) 50 mg tablet TAKE 1 TABLET BY MOUTH EVERY MORNING  1    furosemide (LASIX) 40 mg tablet Take 80 mg by mouth as needed. 3    albuterol (PROVENTIL HFA, VENTOLIN HFA, PROAIR HFA) 90 mcg/actuation inhaler Take 2 Puffs by inhalation every four (4) hours as needed for Wheezing. · It is important that you take the medication exactly as they are prescribed. · Keep your medication in the bottles provided by the pharmacist and keep a list of the medication names, dosages, and times to be taken in your wallet. · Do not take other medications without consulting your doctor. What to do at Home    Recommended diet: Regular Diet, stay hydrated.     You should take a stool softener such as colace for constipation. If constipation persists for >24 hours you should take a dose of Milk of Magnesia. Call if your constipation persists. Keep dry, may shower  Recommended activity:   No driving for 1 week and/or while on pain medication  Walk at least 6 times per day  Showers okay, no tub bathing/swimming for two weeks  Activity as able, stairs okay. If you experience any of the following persisting symptoms:    Nausea/vomiting, fever > 101 F, diarrhea/constipation, increasing pain despite medication, increasing vaginal discharge or any concerns, please call our office. Follow-up with Dr. Karson Gonzalez in 3 week. Call [phone] for an appointment. Information obtained by :  I understand that if any problems occur once I am at home I am to contact my physician. I understand and acknowledge receipt of the instructions indicated above. Physician's or R.N.'s Signature                                                                  Date/Time                                                                                                                                              Patient or Representative Signature                                                          Date/Time      ______________________________________________________________________    Anesthesia Discharge Instructions    After general anesthesia or intervenous sedation, for 24 hours or while taking prescription Narcotics:  · Limit your activities  · Do not drive or operate hazardous machinery  · If you have not urinated within 8 hours after discharge, please contact your surgeon on call.   · Do not make important personal or business decisions  · Do not drink alcoholic beverages    Report the following to your surgeon:  · Excessive pain, swelling, redness or odor of or around the surgical area  · Temperature over 100.5 degrees  · Nausea and vomiting lasting longer than 4 hours or if unable to take medication  · Any signs of decreased circulation or nerve impairment to extremity:  Change in color, persistent numbness, tingling, coldness or increased pain.   · Any questions

## 2018-05-04 NOTE — OP NOTES
Name: Navarro Jones  : 1960  MRN: [id number]  Age: 65 y.o. Preoperative Diagnosis:  Insufficient venous access      Ovarian cancer  Postoperative Diagnosis: Same    Procedure: Subclavian vein catheterization 30136    Placement of IV port and catheter, radiographic guidance 65693    Surgeon: Amalia Mireles MD    Anes:  MAC and local lidocaine    Complications:    None  EBL:  1 cc  Findings: N/A  Implants: IV port/catheter  Drains: None  Specimen:  None  Procedure: The patient was brought to the operating room and positioned and drapped in the standard fashion for left subclavian vein catheterization. After an adequate level of anesthesia was obtained, left subclavian catheterizaion was performed on the first pass and a guidewire place with the tip confirmed at the junction of the SVC and right atrium. Using the standard introducing trocar and sheath the IV catheter was now place with the tip again confirmed at the junction of the SVC and right atrium by active radiographic means. Skin to tip distance was 20 cm. Good blood flow was noted by way of a push/pull technique. A four cm transverse skin incision was now placed four cm inferior to the left midclavicular line and a subcutaneous pocket developed to the fascia with blunt and cautery dissection. The aforementioned catheter was tunnelled to the transverse incision and the port/catheter were assembled in the standard fashion. The port was now anchored to the underlying fascia with two stitches of 2-0 prolene. Hemostasis was observed. The skin was reapproximated with interrupted sutures of 4-0 monocril. Hemostasis was observed. The entire port was flush with Heplock. Sponge needle and instrument count was correct. Bandage was applied and the patient transferred to the PACU in stable condition, CXR is pending at the time of dictation.     Amalia Mireles MD

## 2018-05-04 NOTE — BRIEF OP NOTE
BRIEF OPERATIVE NOTE    Date of Procedure: 5/4/2018   Preoperative Diagnosis: OVARIAN CANCER   Postoperative Diagnosis: OVARIAN CANCER    Procedure(s):  PORT A CATH  INSERTION  Surgeon(s) and Role:     * Michael Kwon MD - Primary         Surgical Assistant: None    Surgical Staff:  Circ-1: Claudean Maes, RN  Radiology Technician: Clemencia Aguirre  Scrub RN-1: Boris Felder RN  Float Staff: Mariajose Reis RN  Event Time In   Incision Start 9264   Incision Close 0802     Anesthesia: MAC   Estimated Blood Loss: 1 cc  Specimens: * No specimens in log *   Findings: N/A   Complications: None  Implants:   Implant Name Type Inv.  Item Serial No.  Lot No. LRB No. Used Action   PORT VASC INFUS SET 8FR TI -- SMART PORT CT - SN/A   PORT VASC INFUS SET 8FR TI -- SMART PORT CT N/A [employer] [id number] Left 1 Implanted     Michael Kwon MD

## 2018-05-04 NOTE — ANESTHESIA POSTPROCEDURE EVALUATION
Post-Anesthesia Evaluation and Assessment    Patient: Russel Woo MRN: [id number]  SSN: [id number]    YOB: 1960  Age: 65 y.o. Sex: female       Cardiovascular Function/Vital Signs  Visit Vitals    /55    Pulse 67    Temp 36.6 °C (97.8 °F)    Resp 21    Ht 5' 8.5\" (1.74 m)    Wt 140.6 kg (310 lb)    SpO2 97%    BMI 46.45 kg/m2       Patient is status post MAC anesthesia for Procedure(s):  PORT A CATH  INSERTION. Nausea/Vomiting: None    Postoperative hydration reviewed and adequate. Pain:  Pain Scale 1: Numeric (0 - 10) (05/04/18 0814)  Pain Intensity 1: 0 (05/04/18 0814)   Managed    Neurological Status:   Neuro (WDL): Within Defined Limits (05/04/18 0814)   At baseline    Mental Status and Level of Consciousness: Arousable    Pulmonary Status:   O2 Device: Nasal cannula (05/04/18 0814)   Adequate oxygenation and airway patent    Complications related to anesthesia: None    Post-anesthesia assessment completed.  No concerns    Signed By: Marcelo Camarena MD     May 4, 2018

## 2018-05-04 NOTE — PERIOP NOTES
Patient: Kita Key MRN: [id number]  SSN: [id number]   YOB: 1960  Age: 65 y.o. Sex: female     Patient is status post Procedure(s):  PORT A CATH  INSERTION. Surgeon(s) and Role:     * Juan Breen MD - Primary    Local/Dose/Irrigation:  1% xylocaine plain with 2 mL of Sodium Bicarb = 20 mL. Venous Access Device ANGIODYNAMICS SMART PORT 05/04/18 Upper chest (subclavicular area), left (Active)   Site Assessment Clean, dry, & intact 5/4/2018  7:53 AM   Dressing Status Clean, dry, & intact 5/4/2018  7:53 AM   Dressing Type Transparent; Other (comments) 5/4/2018  7:53 AM      Peripheral IV 05/04/18 Left Hand (Active)   Site Assessment Clean, dry, & intact 5/4/2018  6:54 AM   Phlebitis Assessment 0 5/4/2018  6:54 AM   Infiltration Assessment 0 5/4/2018  6:54 AM   Dressing Status Clean, dry, & intact; New 5/4/2018  6:54 AM   Dressing Type Tape;Transparent 5/4/2018  6:54 AM   Hub Color/Line Status Pink; Infusing 5/4/2018  6:54 AM   Action Taken Open ports on tubing capped 5/4/2018  6:54 AM   Alcohol Cap Used Yes 5/4/2018  6:54 AM                           Dressing/Packing:  Wound Chest Left-DRESSING TYPE: 2 x 2;Adhesive wound closure strips (Steri-Strips); Transparent film (05/04/18 0802)  Splint/Cast:  ]    Other:  SCDs

## 2018-05-04 NOTE — ANESTHESIA PREPROCEDURE EVALUATION
Anesthetic History     PONV          Review of Systems / Medical History  Patient summary reviewed, nursing notes reviewed and pertinent labs reviewed    Pulmonary        Sleep apnea    Asthma        Neuro/Psych         Psychiatric history     Cardiovascular    Hypertension                   GI/Hepatic/Renal     GERD           Endo/Other    Diabetes  Hypothyroidism  Morbid obesity, arthritis, cancer and anemia     Other Findings            Physical Exam    Airway  Mallampati: II  TM Distance: > 6 cm  Neck ROM: normal range of motion   Mouth opening: Normal     Cardiovascular  Regular rate and rhythm,  S1 and S2 normal,  no murmur, click, rub, or gallop             Dental    Dentition: Upper dentition intact and Lower dentition intact     Pulmonary  Breath sounds clear to auscultation               Abdominal  GI exam deferred       Other Findings            Anesthetic Plan    ASA: 3  Anesthesia type: general          Induction: Intravenous  Anesthetic plan and risks discussed with: Patient

## 2018-05-04 NOTE — PERIOP NOTES
0700 - Patient interviewed in holding area, patient identity, allergies, and procedure verified. 12 - Patient's family member called and updated on patient progression and start of procedure.    No specimen collected for this procedure, verified with MD.

## 2018-05-08 ENCOUNTER — HOSPITAL ENCOUNTER (OUTPATIENT)
Dept: INFUSION THERAPY | Age: 58
Discharge: HOME | End: 2018-05-08
Payer: MEDICARE

## 2018-05-08 VITALS
BODY MASS INDEX: 43.4 KG/M2 | RESPIRATION RATE: 16 BRPM | SYSTOLIC BLOOD PRESSURE: 133 MMHG | DIASTOLIC BLOOD PRESSURE: 68 MMHG | TEMPERATURE: 98.5 F | HEART RATE: 64 BPM | HEIGHT: 69 IN | WEIGHT: 293 LBS

## 2018-05-08 DIAGNOSIS — K21.9 GASTROESOPHAGEAL REFLUX DISEASE, ESOPHAGITIS PRESENCE NOT SPECIFIED: ICD-10-CM

## 2018-05-08 PROCEDURE — 74011250636 HC RX REV CODE- 250/636: Performed by: OBSTETRICS & GYNECOLOGY

## 2018-05-08 PROCEDURE — 96413 CHEMO IV INFUSION 1 HR: CPT

## 2018-05-08 PROCEDURE — 77030012965 HC NDL HUBR BBMI -A

## 2018-05-08 PROCEDURE — 74011000250 HC RX REV CODE- 250: Performed by: OBSTETRICS & GYNECOLOGY

## 2018-05-08 PROCEDURE — 74011000258 HC RX REV CODE- 258: Performed by: OBSTETRICS & GYNECOLOGY

## 2018-05-08 PROCEDURE — 96375 TX/PRO/DX INJ NEW DRUG ADDON: CPT

## 2018-05-08 RX ORDER — SODIUM CHLORIDE 9 MG/ML
50 INJECTION, SOLUTION INTRAVENOUS CONTINUOUS
Status: DISPENSED | OUTPATIENT
Start: 2018-05-08 | End: 2018-05-09

## 2018-05-08 RX ORDER — DEXLANSOPRAZOLE 60 MG/1
60 CAPSULE, DELAYED RELEASE ORAL DAILY
Qty: 30 CAP | Refills: 11 | Status: SHIPPED | OUTPATIENT
Start: 2018-05-08 | End: 2018-05-14 | Stop reason: SDUPTHER

## 2018-05-08 RX ORDER — SODIUM CHLORIDE 9 MG/ML
10 INJECTION INTRAMUSCULAR; INTRAVENOUS; SUBCUTANEOUS AS NEEDED
Status: ACTIVE | OUTPATIENT
Start: 2018-05-08 | End: 2018-05-09

## 2018-05-08 RX ORDER — HEPARIN 100 UNIT/ML
500 SYRINGE INTRAVENOUS AS NEEDED
Status: ACTIVE | OUTPATIENT
Start: 2018-05-08 | End: 2018-05-09

## 2018-05-08 RX ORDER — SODIUM CHLORIDE 0.9 % (FLUSH) 0.9 %
5-10 SYRINGE (ML) INJECTION AS NEEDED
Status: ACTIVE | OUTPATIENT
Start: 2018-05-08 | End: 2018-05-09

## 2018-05-08 RX ADMIN — PACLITAXEL 120 MG: 6 INJECTION, SOLUTION INTRAVENOUS at 15:25

## 2018-05-08 RX ADMIN — DEXAMETHASONE SODIUM PHOSPHATE 10 MG: 4 INJECTION, SOLUTION INTRAMUSCULAR; INTRAVENOUS at 14:56

## 2018-05-08 RX ADMIN — Medication 10 ML: at 16:40

## 2018-05-08 RX ADMIN — SODIUM CHLORIDE 10 ML: 9 INJECTION INTRAMUSCULAR; INTRAVENOUS; SUBCUTANEOUS at 14:00

## 2018-05-08 RX ADMIN — SODIUM CHLORIDE, PRESERVATIVE FREE 500 UNITS: 5 INJECTION INTRAVENOUS at 16:40

## 2018-05-08 RX ADMIN — SODIUM CHLORIDE 20 MG: 9 INJECTION INTRAMUSCULAR; INTRAVENOUS; SUBCUTANEOUS at 14:52

## 2018-05-08 RX ADMIN — DIPHENHYDRAMINE HYDROCHLORIDE 50 MG: 50 INJECTION INTRAMUSCULAR; INTRAVENOUS at 14:54

## 2018-05-08 RX ADMIN — SODIUM CHLORIDE 50 ML/HR: 900 INJECTION, SOLUTION INTRAVENOUS at 14:02

## 2018-05-08 NOTE — PROGRESS NOTES
Outpatient Infusion Center - Chemotherapy Progress Note    1350 Pt admit to [facility] for Paclitaxel. Chemotherapy Flowsheet 5/8/2018   Cycle C1D1   Date 5/8/2018   Drug / Regimen Paclitaxel   Dosage -   Pre Hydration -   Pre Meds given   Notes given     Pt ambulatory in stable condition. Assessment completed. Been off of chemo for a year. New reoccurrence. Has had Taxol in the past. Never had any issues. No new concerns voiced. Port accessed with positive blood return. Steri strips in place. Labs drawn at previous appointment. Line connected to NS at [facility]. Visit Vitals    /68 (BP 1 Location: Right arm, BP Patient Position: Sitting)    Pulse 81    Temp 97.7 °F (36.5 °C)    Resp 18    Ht 5' 8.5\" (1.74 m)    Wt 140 kg (308 lb 9.6 oz)    LMP 09/07/2011    Breastfeeding No    BMI 46.24 kg/m2       Medications:  NS at [facility]  Benadryl 50 MG IVP  Pepcid 20 MG IVP  Decadron 10 MG IVP  Paclitaxel    1640 Pt tolerated treatment well. Port maintained positive blood return throughout treatment, flushed with positive blood return at conclusion and de-accessed. D/c home ambulatory in no distress. Pt aware of next [facility] appointment scheduled for 05/15/18 at 1000.

## 2018-05-11 RX ORDER — DIPHENHYDRAMINE HYDROCHLORIDE 50 MG/ML
50 INJECTION, SOLUTION INTRAMUSCULAR; INTRAVENOUS ONCE
Status: COMPLETED | OUTPATIENT
Start: 2018-05-15 | End: 2018-05-15

## 2018-05-11 RX ORDER — DEXAMETHASONE SODIUM PHOSPHATE 4 MG/ML
10 INJECTION, SOLUTION INTRA-ARTICULAR; INTRALESIONAL; INTRAMUSCULAR; INTRAVENOUS; SOFT TISSUE ONCE
Status: COMPLETED | OUTPATIENT
Start: 2018-05-15 | End: 2018-05-15

## 2018-05-14 DIAGNOSIS — K21.9 GASTROESOPHAGEAL REFLUX DISEASE, ESOPHAGITIS PRESENCE NOT SPECIFIED: ICD-10-CM

## 2018-05-14 RX ORDER — DEXLANSOPRAZOLE 60 MG/1
60 CAPSULE, DELAYED RELEASE ORAL DAILY
Qty: 90 CAP | Refills: 3 | Status: SHIPPED | OUTPATIENT
Start: 2018-05-14 | End: 2019-06-20 | Stop reason: SDUPTHER

## 2018-05-15 ENCOUNTER — HOSPITAL ENCOUNTER (OUTPATIENT)
Dept: INFUSION THERAPY | Age: 58
Discharge: HOME | End: 2018-05-15
Payer: MEDICARE

## 2018-05-15 VITALS
WEIGHT: 293 LBS | DIASTOLIC BLOOD PRESSURE: 80 MMHG | BODY MASS INDEX: 43.4 KG/M2 | TEMPERATURE: 98.2 F | HEART RATE: 76 BPM | RESPIRATION RATE: 18 BRPM | HEIGHT: 69 IN | SYSTOLIC BLOOD PRESSURE: 142 MMHG

## 2018-05-15 LAB
BASOPHILS # BLD: 0.1 K/UL (ref 0–0.1)
BASOPHILS NFR BLD: 1 % (ref 0–1)
DIFFERENTIAL METHOD BLD: ABNORMAL
EOSINOPHIL # BLD: 0.2 K/UL (ref 0–0.4)
EOSINOPHIL NFR BLD: 3 % (ref 0–7)
ERYTHROCYTE [DISTWIDTH] IN BLOOD BY AUTOMATED COUNT: 13.4 % (ref 11.5–14.5)
HCT VFR BLD AUTO: 28 % (ref 35–47)
HGB BLD-MCNC: 8.4 G/DL (ref 11.5–16)
IMM GRANULOCYTES # BLD: 0.1 K/UL (ref 0–0.04)
IMM GRANULOCYTES NFR BLD AUTO: 1 % (ref 0–0.5)
LYMPHOCYTES # BLD: 2 K/UL (ref 0.8–3.5)
LYMPHOCYTES NFR BLD: 27 % (ref 12–49)
MCH RBC QN AUTO: 28.3 PG (ref 26–34)
MCHC RBC AUTO-ENTMCNC: 30 G/DL (ref 30–36.5)
MCV RBC AUTO: 94.3 FL (ref 80–99)
MONOCYTES # BLD: 0.5 K/UL (ref 0–1)
MONOCYTES NFR BLD: 7 % (ref 5–13)
NEUTS SEG # BLD: 4.5 K/UL (ref 1.8–8)
NEUTS SEG NFR BLD: 62 % (ref 32–75)
NRBC # BLD: 0 K/UL (ref 0–0.01)
NRBC BLD-RTO: 0 PER 100 WBC
PLATELET # BLD AUTO: 189 K/UL (ref 150–400)
PMV BLD AUTO: 9.7 FL (ref 8.9–12.9)
RBC # BLD AUTO: 2.97 M/UL (ref 3.8–5.2)
WBC # BLD AUTO: 7.4 K/UL (ref 3.6–11)

## 2018-05-15 PROCEDURE — 74011250636 HC RX REV CODE- 250/636: Performed by: OBSTETRICS & GYNECOLOGY

## 2018-05-15 PROCEDURE — 74011000258 HC RX REV CODE- 258: Performed by: OBSTETRICS & GYNECOLOGY

## 2018-05-15 PROCEDURE — 77030012965 HC NDL HUBR BBMI -A

## 2018-05-15 PROCEDURE — 36415 COLL VENOUS BLD VENIPUNCTURE: CPT | Performed by: OBSTETRICS & GYNECOLOGY

## 2018-05-15 PROCEDURE — 96413 CHEMO IV INFUSION 1 HR: CPT

## 2018-05-15 PROCEDURE — 85025 COMPLETE CBC W/AUTO DIFF WBC: CPT | Performed by: OBSTETRICS & GYNECOLOGY

## 2018-05-15 PROCEDURE — 96375 TX/PRO/DX INJ NEW DRUG ADDON: CPT

## 2018-05-15 PROCEDURE — 74011000250 HC RX REV CODE- 250: Performed by: OBSTETRICS & GYNECOLOGY

## 2018-05-15 RX ORDER — SODIUM CHLORIDE 0.9 % (FLUSH) 0.9 %
5-10 SYRINGE (ML) INJECTION AS NEEDED
Status: ACTIVE | OUTPATIENT
Start: 2018-05-15 | End: 2018-05-16

## 2018-05-15 RX ORDER — HEPARIN 100 UNIT/ML
500 SYRINGE INTRAVENOUS AS NEEDED
Status: ACTIVE | OUTPATIENT
Start: 2018-05-15 | End: 2018-05-16

## 2018-05-15 RX ORDER — SODIUM CHLORIDE 9 MG/ML
25 INJECTION, SOLUTION INTRAVENOUS AS NEEDED
Status: DISPENSED | OUTPATIENT
Start: 2018-05-15 | End: 2018-05-16

## 2018-05-15 RX ORDER — SODIUM CHLORIDE 9 MG/ML
10 INJECTION INTRAMUSCULAR; INTRAVENOUS; SUBCUTANEOUS AS NEEDED
Status: ACTIVE | OUTPATIENT
Start: 2018-05-15 | End: 2018-05-16

## 2018-05-15 RX ADMIN — SODIUM CHLORIDE 10 ML: 9 INJECTION INTRAMUSCULAR; INTRAVENOUS; SUBCUTANEOUS at 11:52

## 2018-05-15 RX ADMIN — DEXAMETHASONE SODIUM PHOSPHATE 10 MG: 4 INJECTION, SOLUTION INTRAMUSCULAR; INTRAVENOUS at 11:52

## 2018-05-15 RX ADMIN — FAMOTIDINE 20 MG: 10 INJECTION, SOLUTION INTRAVENOUS at 11:00

## 2018-05-15 RX ADMIN — PACLITAXEL 120 MG: 6 INJECTION, SOLUTION INTRAVENOUS at 13:32

## 2018-05-15 RX ADMIN — DIPHENHYDRAMINE HYDROCHLORIDE 50 MG: 50 INJECTION INTRAMUSCULAR; INTRAVENOUS at 12:10

## 2018-05-15 RX ADMIN — SODIUM CHLORIDE 25 ML/HR: 900 INJECTION, SOLUTION INTRAVENOUS at 11:52

## 2018-05-15 NOTE — PROGRESS NOTES
[facility] Outpatient Infusion Center Note:  1000Pt arrived at [facility] ambulatory and in no distress for C. 1D8   Assessment *stable no new complaints voiced. Medications received:  Pepcid  Decadron  Benadryl  Taxol    1450 Tolerated treatment well, no adverse reaction noted. D/Cd from [facility] ambulatory and in no distress accompanied by son. Next appt 5/22  Visit Vitals    /80    Pulse 76    Temp 97.1 °F (36.2 °C)    Resp 18    Ht 5' 8.9\" (1.75 m)    Wt 140.2 kg (309 lb)    LMP 09/07/2011    BMI 45.77 kg/m2     Recent Results (from the past 12 hour(s))   CBC WITH AUTOMATED DIFF    Collection Time: 05/15/18 10:32 AM   Result Value Ref Range    WBC 7.4 3.6 - 11.0 K/uL    RBC 2.97 (L) 3.80 - 5.20 M/uL    HGB 8.4 (L) 11.5 - 16.0 g/dL    HCT 28.0 (L) 35.0 - 47.0 %    MCV 94.3 80.0 - 99.0 FL    MCH 28.3 26.0 - 34.0 PG    MCHC 30.0 30.0 - 36.5 g/dL    RDW 13.4 11.5 - 14.5 %    PLATELET 752 482 - 785 K/uL    MPV 9.7 8.9 - 12.9 FL    NRBC 0.0 0  WBC    ABSOLUTE NRBC 0.00 0.00 - 0.01 K/uL    NEUTROPHILS 62 32 - 75 %    LYMPHOCYTES 27 12 - 49 %    MONOCYTES 7 5 - 13 %    EOSINOPHILS 3 0 - 7 %    BASOPHILS 1 0 - 1 %    IMMATURE GRANULOCYTES 1 (H) 0.0 - 0.5 %    ABS. NEUTROPHILS 4.5 1.8 - 8.0 K/UL    ABS. LYMPHOCYTES 2.0 0.8 - 3.5 K/UL    ABS. MONOCYTES 0.5 0.0 - 1.0 K/UL    ABS. EOSINOPHILS 0.2 0.0 - 0.4 K/UL    ABS. BASOPHILS 0.1 0.0 - 0.1 K/UL    ABS. IMM.  GRANS. 0.1 (H) 0.00 - 0.04 K/UL    DF AUTOMATED

## 2018-05-16 RX ORDER — DIPHENHYDRAMINE HYDROCHLORIDE 50 MG/ML
50 INJECTION, SOLUTION INTRAMUSCULAR; INTRAVENOUS ONCE
Status: COMPLETED | OUTPATIENT
Start: 2018-05-22 | End: 2018-05-22

## 2018-05-16 RX ORDER — DEXAMETHASONE SODIUM PHOSPHATE 4 MG/ML
10 INJECTION, SOLUTION INTRA-ARTICULAR; INTRALESIONAL; INTRAMUSCULAR; INTRAVENOUS; SOFT TISSUE ONCE
Status: COMPLETED | OUTPATIENT
Start: 2018-05-22 | End: 2018-05-22

## 2018-05-18 RX ORDER — LINACLOTIDE 145 UG/1
CAPSULE, GELATIN COATED ORAL
Qty: 30 CAP | Refills: 0 | Status: SHIPPED | OUTPATIENT
Start: 2018-05-18 | End: 2018-06-18 | Stop reason: SDUPTHER

## 2018-05-22 ENCOUNTER — HOSPITAL ENCOUNTER (OUTPATIENT)
Dept: INFUSION THERAPY | Age: 58
Discharge: HOME | End: 2018-05-22
Payer: MEDICARE

## 2018-05-22 ENCOUNTER — HOSPITAL ENCOUNTER (OUTPATIENT)
Dept: VASCULAR SURGERY | Age: 58
Discharge: HOME | End: 2018-05-22
Attending: NURSE PRACTITIONER
Payer: MEDICARE

## 2018-05-22 VITALS
HEART RATE: 63 BPM | HEIGHT: 68 IN | WEIGHT: 293 LBS | BODY MASS INDEX: 44.41 KG/M2 | RESPIRATION RATE: 18 BRPM | SYSTOLIC BLOOD PRESSURE: 114 MMHG | DIASTOLIC BLOOD PRESSURE: 67 MMHG

## 2018-05-22 DIAGNOSIS — C80.0 CARCINOMATOSIS (HCC): ICD-10-CM

## 2018-05-22 DIAGNOSIS — R59.0 LYMPHADENOPATHY, MEDIASTINAL: ICD-10-CM

## 2018-05-22 DIAGNOSIS — R22.2 CHEST MASS: ICD-10-CM

## 2018-05-22 DIAGNOSIS — E66.01 MORBID OBESITY (HCC): ICD-10-CM

## 2018-05-22 DIAGNOSIS — M79.605 PAIN AND SWELLING OF LEFT LOWER EXTREMITY: Primary | ICD-10-CM

## 2018-05-22 DIAGNOSIS — M79.89 PAIN AND SWELLING OF LEFT LOWER EXTREMITY: Primary | ICD-10-CM

## 2018-05-22 DIAGNOSIS — C56.9 MALIGNANT NEOPLASM OF OVARY, UNSPECIFIED LATERALITY (HCC): ICD-10-CM

## 2018-05-22 DIAGNOSIS — M79.605 PAIN AND SWELLING OF LEFT LOWER EXTREMITY: ICD-10-CM

## 2018-05-22 DIAGNOSIS — M79.89 PAIN AND SWELLING OF LEFT LOWER EXTREMITY: ICD-10-CM

## 2018-05-22 LAB
BASOPHILS # BLD: 0 K/UL (ref 0–0.1)
BASOPHILS NFR BLD: 1 % (ref 0–1)
DIFFERENTIAL METHOD BLD: ABNORMAL
EOSINOPHIL # BLD: 0.2 K/UL (ref 0–0.4)
EOSINOPHIL NFR BLD: 3 % (ref 0–7)
ERYTHROCYTE [DISTWIDTH] IN BLOOD BY AUTOMATED COUNT: 14 % (ref 11.5–14.5)
HCT VFR BLD AUTO: 27.4 % (ref 35–47)
HGB BLD-MCNC: 8.4 G/DL (ref 11.5–16)
IMM GRANULOCYTES # BLD: 0.1 K/UL (ref 0–0.04)
IMM GRANULOCYTES NFR BLD AUTO: 1 % (ref 0–0.5)
LYMPHOCYTES # BLD: 1.9 K/UL (ref 0.8–3.5)
LYMPHOCYTES NFR BLD: 29 % (ref 12–49)
MCH RBC QN AUTO: 28.8 PG (ref 26–34)
MCHC RBC AUTO-ENTMCNC: 30.7 G/DL (ref 30–36.5)
MCV RBC AUTO: 93.8 FL (ref 80–99)
MONOCYTES # BLD: 0.5 K/UL (ref 0–1)
MONOCYTES NFR BLD: 8 % (ref 5–13)
NEUTS SEG # BLD: 4 K/UL (ref 1.8–8)
NEUTS SEG NFR BLD: 59 % (ref 32–75)
NRBC # BLD: 0 K/UL (ref 0–0.01)
NRBC BLD-RTO: 0 PER 100 WBC
PLATELET # BLD AUTO: 187 K/UL (ref 150–400)
PMV BLD AUTO: 9.4 FL (ref 8.9–12.9)
RBC # BLD AUTO: 2.92 M/UL (ref 3.8–5.2)
WBC # BLD AUTO: 6.8 K/UL (ref 3.6–11)

## 2018-05-22 PROCEDURE — 96413 CHEMO IV INFUSION 1 HR: CPT

## 2018-05-22 PROCEDURE — 93971 EXTREMITY STUDY: CPT

## 2018-05-22 PROCEDURE — 74011250636 HC RX REV CODE- 250/636: Performed by: OBSTETRICS & GYNECOLOGY

## 2018-05-22 PROCEDURE — 96374 THER/PROPH/DIAG INJ IV PUSH: CPT

## 2018-05-22 PROCEDURE — 77030012965 HC NDL HUBR BBMI -A

## 2018-05-22 PROCEDURE — 74011000250 HC RX REV CODE- 250: Performed by: OBSTETRICS & GYNECOLOGY

## 2018-05-22 PROCEDURE — 74011000258 HC RX REV CODE- 258: Performed by: OBSTETRICS & GYNECOLOGY

## 2018-05-22 PROCEDURE — 85025 COMPLETE CBC W/AUTO DIFF WBC: CPT | Performed by: OBSTETRICS & GYNECOLOGY

## 2018-05-22 PROCEDURE — 36591 DRAW BLOOD OFF VENOUS DEVICE: CPT

## 2018-05-22 PROCEDURE — 36415 COLL VENOUS BLD VENIPUNCTURE: CPT | Performed by: OBSTETRICS & GYNECOLOGY

## 2018-05-22 PROCEDURE — 96375 TX/PRO/DX INJ NEW DRUG ADDON: CPT

## 2018-05-22 RX ORDER — HEPARIN 100 UNIT/ML
500 SYRINGE INTRAVENOUS AS NEEDED
Status: ACTIVE | OUTPATIENT
Start: 2018-05-22 | End: 2018-05-23

## 2018-05-22 RX ORDER — SODIUM CHLORIDE 9 MG/ML
10 INJECTION INTRAMUSCULAR; INTRAVENOUS; SUBCUTANEOUS AS NEEDED
Status: ACTIVE | OUTPATIENT
Start: 2018-05-22 | End: 2018-05-23

## 2018-05-22 RX ORDER — SODIUM CHLORIDE 0.9 % (FLUSH) 0.9 %
5-10 SYRINGE (ML) INJECTION AS NEEDED
Status: ACTIVE | OUTPATIENT
Start: 2018-05-22 | End: 2018-05-23

## 2018-05-22 RX ADMIN — FAMOTIDINE 20 MG: 10 INJECTION, SOLUTION INTRAVENOUS at 11:16

## 2018-05-22 RX ADMIN — DEXAMETHASONE SODIUM PHOSPHATE 10 MG: 4 INJECTION, SOLUTION INTRA-ARTICULAR; INTRALESIONAL; INTRAMUSCULAR; INTRAVENOUS; SOFT TISSUE at 11:14

## 2018-05-22 RX ADMIN — PACLITAXEL 120 MG: 6 INJECTION, SOLUTION INTRAVENOUS at 11:45

## 2018-05-22 RX ADMIN — SODIUM CHLORIDE 10 ML: 9 INJECTION INTRAMUSCULAR; INTRAVENOUS; SUBCUTANEOUS at 10:24

## 2018-05-22 RX ADMIN — DIPHENHYDRAMINE HYDROCHLORIDE 50 MG: 50 INJECTION INTRAMUSCULAR; INTRAVENOUS at 11:18

## 2018-05-22 NOTE — PROCEDURES
[facility]  *** FINAL REPORT ***    Name: Camilo Burrell  MRN: [id number]    Outpatient  : 1960  HIS Order #: [id number]  [id number] [facility] Visit #: [id number]  Date: 22 May 2018    TYPE OF TEST: Peripheral Venous Testing    REASON FOR TEST  Pain in limb, Limb swelling    Left Leg:-  Deep venous thrombosis:           No  Superficial venous thrombosis:    No  Deep venous insufficiency:        Not examined  Superficial venous insufficiency: Not examined      INTERPRETATION/FINDINGS  PROCEDURE:  Color duplex ultrasound imaging of lower extremity veins. FINDINGS:       Right: The common femoral vein is patent and without evidence of  thrombus. Phasic flow is observed. This extremity was not otherwise  evaluated. Left:   The common femoral, deep femoral, femoral, popliteal,  posterior tibial, peroneal, and great saphenous are patent and without   evidence of thrombus;  each is fully compressible and there is no  narrowing of the flow channel on color Doppler imaging. Phasic flow  is observed in the common femoral vein. IMPRESSION:  No evidence of left lower extremity vein thrombosis. ADDITIONAL COMMENTS    I have personally reviewed the data relevant to the interpretation of  this  study.     TECHNOLOGIST: Pj Rojo RVT  Signed: 2018 01:55 PM    PHYSICIAN: Gray Green MD  Signed: 2018 03:39 PM

## 2018-05-22 NOTE — PROGRESS NOTES
Outpatient Infusion Center - Chemotherapy Progress Note    1000 Pt admit to [facility] for Paclitaxel. Patient complained of LLE pain and swelling. Ariella Ryan accessed with positive blood return. Steri strips in place. Labs drawn and in process. Patient connected to saline KVO. Chemotherapy Flowsheet 5/22/2018   Cycle C1D15   Date 5/22/2018   Drug / Regimen Pacitaxol   Dosage -   Pre Hydration -   Pre Meds given   Notes given         Visit Vitals    /67    Pulse 63    Resp 18    Ht 5' 8\" (1.727 m)    Wt 138.3 kg (305 lb)    LMP 09/07/2011    BMI 46.38 kg/m2       Medications:  NS at [facility]  Benadryl 50 MG IVP  Pepcid 20 MG IVP  Decadron 10 MG IVP  Paclitaxel    1300 Pt tolerated treatment well. Port maintained positive blood return throughout treatment, flushed with positive blood return at conclusion and de-accessed. D/c home ambulatory in no distress. Pt aware of next [facility] appointment scheduled for 06/5/18 at 1000.

## 2018-05-22 NOTE — PROGRESS NOTES
Monica Alfredo Members     Dr. Pérez Hernandez, JAD      Date of visit: 5/22/2018       Subjective:   Ms. Gin Paz is a long time pt of Dr. Mo Freeman. She was initially dx with ovarian cancer in Sept 2012. She had MARAH/BSO/Cytoreductive surgery on 9/21/12. At that time she was Stage III C. She recovered and began 6 cycles of Carbo/Taxol. Had good response, but reoccurred and in Fall of 2014 she received 3 cycles of Carbo/Taxol before having a reaction to Carbo and this was stopped. She continued her next 3 cycles with Doxil/Avastin. This did not decrease disease. .   She had a treatment break from 3/2015 until she restarted with Topatecan for 11 cycles from July 2015 through March 2016. She was followed by Dr. Mo Freeman and in Jan 2017, she had a CT scan that showed recurrence of disease. She was given Gemzar with good results. In April 2018, she presented to the [facility] ER with c/o CP and a Chest CT  showed chest wall mass in the right parasternal region that invades the sternum with \"massive adenopathy in the mediastinum and increasing adenopathy in the retrocrural region and upper abd. CT guided bx of chest wall mass was performed that showed Metastatic high-grade serous carcinoma   At this point, Dr. Mo Freeman recommended weekly Taxol D1, 8, 15 (he discussed the palliative nature of this treatment)                   Diagnosis: Ovarian cancer  Original Surgery: 9/21/12 Ex-lap, ROULA/BSO, radical tumor debulking  Pathology: High grade serious ovarian cancer with omental cake and carcinomatosis. Stage IIIC    Oncology treatment history:  9/2012: Dx with ovarian cancer in Sept 2012. She had MARAH/BSO/Cytoreductive surgery on 9/21/12.  At that time she was Stage III C  10/2012-2/2013: 6 cycles Carbo/Taxol  8/2014-11/2014: 6 Cycles carbo/Taxol  3/2015-5/13/15: 3 cycles Avastin/Doxil until progression  7/2015-8/2015 Weekly Topotecan   12/2015-3/2016: Weekly Topotecan  2/2017-9/2017:  Gemzar D1/D8 Q28 days for 6 cycles  5/8/2018-Present: Taxol  D1,8,15 for scheduled 3  cycles                Current Outpatient Prescriptions   Medication Sig    LINZESS 145 mcg cap capsule TAKE ONE CAPSULE BY MOUTH EVERY DAY    Dexlansoprazole (DEXILANT) 60 mg CpDB Take 1 Cap by mouth daily.  HYDROcodone-acetaminophen (NORCO) 5-325 mg per tablet Take 1 Tab by mouth every four (4) hours as needed for Pain. Max Daily Amount: 6 Tabs. Indications: Pain    lidocaine-prilocaine (EMLA) topical cream Apply small amount over port area one hour before chemo treatment and cover with a Band-Aid    LUBRICANT EYE 57.3-42.5 % oint ophthalmic ointment APPLY TOPICALLY INTO BOTH EYES EVERY DAY FOR 3 MONTHS    HYDROcodone-acetaminophen (NORCO) 5-325 mg per tablet Take 1 Tab by mouth every four (4) hours as needed for Pain. Max Daily Amount: 6 Tabs.  naloxone (EVZIO) 2 mg/0.4 mL auto-injector 0.4 mL by IntraMUSCular route once as needed for Overdose for up to 1 dose.  levothyroxine (SYNTHROID) 150 mcg tablet TAKE 1 TAB BY MOUTH DAILY (BEFORE BREAKFAST). --OTIX [phone] FOR APPOINTMENT FOR MORE REFILLS    Liraglutide (VICTOZA) 0.6 mg/0.1 mL (18 mg/3 mL) pnij 1.8 mg by SubCUTAneous route daily (with lunch).  insulin detemir (LEVEMIR FLEXTOUCH) 100 unit/mL (3 mL) inpn INJECT 30 UNITS IN AM AND 50 UNITS AT NIGHT. INCREASE AS DIRECTED TO  UNITS PER DAY--CALL [phone] FOR APPOINTMENT FOR MORE REFILLS    insulin aspart (NOVOLOG FLEXPEN) 100 unit/mL inpn INJECT 20 UNITS BEFORE EACH MEAL + 4 UNITS FOR EVERY 50 MG/DL ABOVE 150 MG/DL.  UNITS PER DAY    potassium chloride (KLOR-CON M10) 10 mEq tablet TAKE 1 TAB BY MOUTH TWO (2) DAYS A WEEK. WED AND FRI    SYMBICORT 160-4.5 mcg/actuation HFAA Take 2 Puffs by inhalation two (2) times daily as needed.  valsartan (DIOVAN) 80 mg tablet Take 1 Tab by mouth daily.  montelukast (SINGULAIR) 10 mg tablet TAKE 1 TAB BY MOUTH DAILY.     omega-3 acid ethyl esters (LOVAZA) 1 gram capsule TAKE ONE CAPSULE BY MOUTH TWICE A DAY    diphenhydrAMINE (BENADRYL) 25 mg capsule Take 25 mg by mouth as needed.  albuterol (PROVENTIL VENTOLIN) 2.5 mg /3 mL (0.083 %) nebulizer solution USE 1 VAIL VIA NEBULIZER EVERY 4 HOURS AS NEEDED FOR WHEEZING    ondansetron (ZOFRAN ODT) 8 mg disintegrating tablet Take 1 Tab by mouth every eight (8) hours as needed for Nausea.  aspirin delayed-release 81 mg tablet Take  by mouth daily.  BD INSULIN PEN NEEDLE UF SHORT 31 gauge x 5/16\" ndle USE WITH INSULIN TO INJECT FIVE TIMES DAILY.  fluticasone (FLONASE) 50 mcg/actuation nasal spray 2 Sprays by Both Nostrils route daily.  clonazePAM (KLONOPIN) 0.5 mg tablet Take 0.5 mg by mouth nightly as needed.  nystatin (MYCOSTATIN) powder Apply  to affected area four (4) times daily as needed.  cycloSPORINE (RESTASIS) 0.05 % ophthalmic emulsion 1 Drop two (2) times a day.  sertraline (ZOLOFT) 50 mg tablet TAKE 1 TABLET BY MOUTH EVERY MORNING    cholecalciferol, VITAMIN D3, (VITAMIN D3) 5,000 unit tab tablet Take 5,000 Units by mouth daily.  furosemide (LASIX) 40 mg tablet Take 80 mg by mouth as needed.  albuterol (PROVENTIL HFA, VENTOLIN HFA, PROAIR HFA) 90 mcg/actuation inhaler Take 2 Puffs by inhalation every four (4) hours as needed for Wheezing.  fexofenadine (ALLEGRA) 180 mg tablet Take 180 mg by mouth daily. Current Facility-Administered Medications   Medication Dose Route Frequency    sodium chloride (NS) flush 5-10 mL  5-10 mL IntraVENous PRN    heparin (porcine) pf 500 Units  500 Units IntraVENous PRN    sodium chloride 0.9% injection 10 mL  10 mL IntraVENous PRN        Review of Systems:  General: She is in good spirits with her son present  HEENT: Denies visual changes, dysphagia or headache  Resp: Denies SOB, or change in ECKERT, wheezing or cough  CV: Denies CP, palpitations.   Continues with chest pressure where she has her sternal mass.  She says it is the same discomfort as she has had since they discovered it and denies it changing or radiating   GI/: Denies N/V, diarrhea, constipation or dysuria   [place]el:Continues with DJD knee pain. She complains of left leg swelling and discomfort to lower leg. Says it has been for about 2 weeks. None to right   Neuro: Denies dizzyness or syncope    Objective:     Patient Vitals for the past 8 hrs:   BP Pulse Resp Height Weight   05/22/18 1022 114/67 63 18 - -   05/22/18 1011 - - - 5' 8\" (1.727 m) 305 lb (138.3 kg)       Physical Exam:  General: A&O X3 in NAD with pleasant affect. HEENT: Sclera anicteric, pupils equal and reactive to light. Mucosa pink, moist  Neck: No JVD. No cervical adenopathy appreciated. Heart: Regular with persistent 2-3/6 murmur  Lungs: CTA Bilat without wheezing or rales. Decreased to bases due to effort, improves with encouragement of taking deep breath   Abd: Soft, obese, NT/ND with + BS throughout. No CVA tenderness noted  Ext: Left lower ext with good pulses and color. 4+ edema noted with tenderness to inner calf area. Right leg with + pedal pulses with 3+ edema   Neuro: grossly intact    Recent Results (from the past 12 hour(s))   CBC WITH AUTOMATED DIFF    Collection Time: 05/22/18 10:13 AM   Result Value Ref Range    WBC 6.8 3.6 - 11.0 K/uL    RBC 2.92 (L) 3.80 - 5.20 M/uL    HGB 8.4 (L) 11.5 - 16.0 g/dL    HCT 27.4 (L) 35.0 - 47.0 %    MCV 93.8 80.0 - 99.0 FL    MCH 28.8 26.0 - 34.0 PG    MCHC 30.7 30.0 - 36.5 g/dL    RDW 14.0 11.5 - 14.5 %    PLATELET 909 646 - 023 K/uL    MPV 9.4 8.9 - 12.9 FL    NRBC 0.0 0  WBC    ABSOLUTE NRBC 0.00 0.00 - 0.01 K/uL    NEUTROPHILS 59 32 - 75 %    LYMPHOCYTES 29 12 - 49 %    MONOCYTES 8 5 - 13 %    EOSINOPHILS 3 0 - 7 %    BASOPHILS 1 0 - 1 %    IMMATURE GRANULOCYTES 1 (H) 0.0 - 0.5 %    ABS. NEUTROPHILS 4.0 1.8 - 8.0 K/UL    ABS. LYMPHOCYTES 1.9 0.8 - 3.5 K/UL    ABS. MONOCYTES 0.5 0.0 - 1.0 K/UL    ABS.  EOSINOPHILS 0.2 0.0 - 0.4 K/UL    ABS. BASOPHILS 0.0 0.0 - 0.1 K/UL    ABS. IMM. GRANS. 0.1 (H) 0.00 - 0.04 K/UL    DF AUTOMATED         IMAGING:  CT Chest 4/10/18  FINDINGS:  LUNGS: There is a rounded patchy area of airspace process in the periphery of  the superior segment of the right lower lobe as well as patchy areas along the  periphery just inferior to it. Similar finding noted in the periphery of the  lingula as well as more centrally in the lingula. These may be areas of acute  airspace process. [place] PLEURA: There is a chest wall mass which lies along the right side of the  proximal body of the sternum which has pleural base and extends through the  chest wall as well as eroding the adjacent sternum. On axial imaging this mass  is 4.9 x 4.4 cm in size. This may account for the patient's right chest wall  symptoms. TRACHEA/BRONCHI: Patent      The absence of intravenous contrast reduces the sensitivity for evaluation of  the mediastinum and upper abdominal organs.     MEDIASTINUM/SWATI: There is massive adenopathy in the superior mediastinum as  well as adenopathy which extends into the hilar regions bilaterally and in the  subcarinal region. Adenopathy is also identified in the pericardial lymph nodes  on the right. Largest of these measures 2.1 cm. There is also an enlarged lymph  node along the left posterior margin of the manubrium/body of the sternum which  measures 2.9 cm. .     AORTA:  The aorta has a normal contour with no evidence of aneurysm.     UPPER ABDOMEN: On previous abdominal CT adenopathy was noted in the upper  abdomen. There are retrocrural lymph nodes as well the largest of which on the  left measures 1.4 x 3.7 cm. Previously this lymph node measured 0.8 x 3.1 cm. There is marked adenopathy in the celiac axis extending into the hepatic hilus. This also extends into the area of the falciform ligament. This is more  extensive than on the previous study.  There is also perigastric adenopathy on  the left which is now measures approximately 5.3 x 7.4 cm. Previously this was  5.2 x 4.0 cm. .     BONES: As mentioned previously there is lytic erosion along the right side of  the proximal body of the sternum adjacent to the mass. There is a lucency in the  T4 vertebral body.       IMPRESSION:    1. There is a chest wall mass in the right parasternal region which invades the  sternum and is seen on both the internal internal as well as external sides of  the chest wall. This may account for the patient's symptoms of right-sided chest  pain. 2. There are patchy areas of airspace process bilaterally which may represent  areas of acute process in the to be correlated clinically. 3. Massive adenopathy in the mediastinum noted. 4. Increasing adenopathy in the retrocrural region and upper abdomen. .        Pathology: 9/2012:  FINAL PATHOLOGIC DIAGNOSIS  1. Omentum, omentectomy:  Serous carcinoma, high grade  2. Soft tissue, umbilicus, biopsy:  Serous carcinoma, high grade  3. Peritoneum, periappendiceal, biopsy:  Serous carcinoma, high grade  4. Colonic mesentery, biopsy:  Serous carcinoma, high grade  See comment  5.  Uterus (157 grams), ovaries and fallopian tubes, supracervical hysterectomy and bilateral salpingooophorectomy:  Cervix: No diagnostic pathologic abnormality  Myometrium: No diagnostic pathologic abnormality  Endometrium: Benign polyp and cystic atrophy  Ovaries and fallopian tubes: Serous carcinoma, high grade  Serosa: Serous carcinoma implants, high-grade  See synoptic report  Synoptic Report:  Specimen: Uterus, ovaries and fallopian tubes, omentum  Procedure: Supracervical hysterectomy, bilateral salpingo-oophorectomy, omentectomy  Lymph node sampling: No lymph nodes present  Specimen integrity:  Right ovary: Intact        Assessment:     Patient Active Problem List   Diagnosis Code    Histor of ovarian cancer Z85.43    Carcinomatosis ([facility] [address] C80.0    Controlled type 2 diabetes mellitus without complication, with long-term current use of insulin (HCC) E11.9, Z79.4    Morbid obesity ([facility] [address] E66.01    Abnormal mammogram R92.8    Sleep apnea G47.30    Dyslipidemia (high LDL; low HDL) E78.5    Benign essential hypertension I10    Disorder of thyroid E07.9    CINV (chemotherapy-induced nausea and vomiting) R11.2, T45.1X5A    Swelling of eye, bilateral--left > right H57.8    Portacath in place--left Z95.828    Chest pain, atypical R07.89    Hx of pulmonary embolus during pregnancy Z86.711, Z87.59    Elevated CA-125 R97.1    Ovarian cancer (HCC) C56.9    Hyperglycemia due to type 2 diabetes mellitus ([facility] [address] E11.65    Preop cardiovascular exam Z01.810    Angina pectoris associated with type 2 diabetes mellitus (HCC) E11.59, I20.9    ARF (acute renal failure) (HCC) N17.9    Renal calculi N20.0    Elevated BUN R79.9    Pulmonary hypertension, mild (HCC) I27.20    UTI (urinary tract infection) N39.0    Wheezing R06.2    Lymphadenopathy, mediastinal R59.0    Type 2 diabetes mellitus with nephropathy (HCC) E11.21    Chest mass R22.2    Chest wall mass R22.2         Plan: Will proceed with C1 W3 of Taxol  We will obtain a doppler of her leg when infusion completed today  Encouraged to drink pleanty of water   Follow up with cardiologist as prn  If she has ANY chest pain or associated pain or concern, she was instructed to proceed directly to the ER  Follow up with PCP re-DM/HTN/Lipids as scheduled  Continue current meds  Hydration encouraged  Encouraged to call for any concerns or questions  Sixto Postal, NP     ADDENDUM:   Doppler Negative for DVT    FINDINGS:       Right: The common femoral vein is patent and without evidence of  thrombus. Phasic flow is observed. This extremity was not otherwise  evaluated.        Left:   The common femoral, deep femoral, femoral, popliteal,  posterior tibial, peroneal, and great saphenous are patent and without   evidence of thrombus;  each is fully compressible and there is no  narrowing of the flow channel on color Doppler imaging. Phasic flow  is observed in the common femoral vein.     IMPRESSION:  No evidence of left lower extremity vein thrombosis. Will monitor. Discussed with Dr. Tanya Jade who feels may be new adenopathy and hopefully chemo will help.      Greater than 60 minutes spent with pt with greater than 50% spent in face to face/evaluation discussion   Sixto Postal, NP

## 2018-05-26 DIAGNOSIS — J30.2 OTHER SEASONAL ALLERGIC RHINITIS: ICD-10-CM

## 2018-05-27 RX ORDER — MONTELUKAST SODIUM 10 MG/1
TABLET ORAL
Qty: 90 TAB | Refills: 1 | Status: SHIPPED | OUTPATIENT
Start: 2018-05-27 | End: 2018-11-27

## 2018-05-29 DIAGNOSIS — I10 ESSENTIAL HYPERTENSION: ICD-10-CM

## 2018-05-29 RX ORDER — VALSARTAN 80 MG/1
TABLET ORAL
Qty: 30 TAB | Refills: 4 | Status: SHIPPED | OUTPATIENT
Start: 2018-05-29 | End: 2018-06-27 | Stop reason: SDUPTHER

## 2018-05-31 ENCOUNTER — OFFICE VISIT (OUTPATIENT)
Dept: GYNECOLOGY | Age: 58
End: 2018-05-31

## 2018-05-31 VITALS
BODY MASS INDEX: 44.41 KG/M2 | HEIGHT: 68 IN | DIASTOLIC BLOOD PRESSURE: 80 MMHG | HEART RATE: 76 BPM | SYSTOLIC BLOOD PRESSURE: 138 MMHG | WEIGHT: 293 LBS

## 2018-05-31 DIAGNOSIS — G47.33 OBSTRUCTIVE SLEEP APNEA SYNDROME: ICD-10-CM

## 2018-05-31 DIAGNOSIS — E11.21 TYPE 2 DIABETES MELLITUS WITH NEPHROPATHY (HCC): ICD-10-CM

## 2018-05-31 DIAGNOSIS — C56.9 MALIGNANT NEOPLASM OF OVARY, UNSPECIFIED LATERALITY (HCC): Primary | ICD-10-CM

## 2018-05-31 DIAGNOSIS — F32.9 REACTIVE DEPRESSION: ICD-10-CM

## 2018-05-31 DIAGNOSIS — N18.30 STAGE 3 CHRONIC KIDNEY DISEASE (HCC): ICD-10-CM

## 2018-05-31 DIAGNOSIS — R22.2 CHEST WALL MASS: ICD-10-CM

## 2018-05-31 DIAGNOSIS — Z79.899 ON ANTINEOPLASTIC CHEMOTHERAPY: ICD-10-CM

## 2018-05-31 PROBLEM — N17.9 ARF (ACUTE RENAL FAILURE) (HCC): Status: RESOLVED | Noted: 2017-03-02 | Resolved: 2018-05-31

## 2018-05-31 PROBLEM — N39.0 UTI (URINARY TRACT INFECTION): Status: RESOLVED | Noted: 2017-04-03 | Resolved: 2018-05-31

## 2018-05-31 PROBLEM — R06.2 WHEEZING: Status: RESOLVED | Noted: 2017-07-18 | Resolved: 2018-05-31

## 2018-05-31 RX ORDER — DIPHENHYDRAMINE HYDROCHLORIDE 50 MG/ML
50 INJECTION, SOLUTION INTRAMUSCULAR; INTRAVENOUS ONCE
Status: COMPLETED | OUTPATIENT
Start: 2018-06-05 | End: 2018-06-05

## 2018-05-31 RX ORDER — DEXAMETHASONE SODIUM PHOSPHATE 4 MG/ML
10 INJECTION, SOLUTION INTRA-ARTICULAR; INTRALESIONAL; INTRAMUSCULAR; INTRAVENOUS; SOFT TISSUE ONCE
Status: COMPLETED | OUTPATIENT
Start: 2018-06-05 | End: 2018-06-05

## 2018-05-31 NOTE — PROGRESS NOTES
[address], [address] [facility]  P [phone]  F [phone]      Office Visit    Patient ID:  Name: Kisha Salazar  MRN: [id number]  : 1960/57 y.o. Visit date: 2018    Primary Diagnosis:     ICD-10-CM ICD-9-CM    1. Malignant neoplasm of ovary, unspecified laterality (HCC) C56.9 183.0    2. On antineoplastic chemotherapy Z79. V58.69    3. Type 2 diabetes mellitus with nephropathy (HCC) E11.21 250.40      583.81    4. Obstructive sleep apnea syndrome G47.33 327.23    5. Reactive depression F32.9 300.4    6. Chest wall mass R22.2 786.6    7. Stage 3 chronic kidney disease N18.3 585. 3         Problem List:    Patient Active Problem List   Diagnosis Code    Carcinomatosis ([address] C80.0    Controlled type 2 diabetes mellitus without complication, with long-term current use of insulin ([address] E11.9, Z79.4    Morbid obesity ([address] E66.01    Abnormal mammogram R92.8    Sleep apnea G47.30    Dyslipidemia (high LDL; low HDL) E78.5    Benign essential hypertension I10    Disorder of thyroid E07.9    CINV (chemotherapy-induced nausea and vomiting) R11.2, T45.1X5A    Swelling of eye, bilateral--left > right H57.8    Portacath in place--left Z95.828    Chest pain, atypical R07.89    Hx of pulmonary embolus during pregnancy Z86.711, Z87.59    Ovarian cancer (HCC) C56.9    Hyperglycemia due to type 2 diabetes mellitus ([address] E11.65    Angina pectoris associated with type 2 diabetes mellitus ([address] E11.59, I20.9    Renal calculi N20.0    Elevated BUN R79.9    Pulmonary hypertension, mild (HCC) I27.20    Lymphadenopathy, mediastinal R59.0    Type 2 diabetes mellitus with nephropathy (HCC) E11.21    Chest mass R22.2    Chest wall mass R22.2    On antineoplastic chemotherapy Z79.899    Reactive depression F32.9    Stage 3 chronic kidney disease N18.3       INTERVAL HISTORY: Kisha Salazar is a [facility] American female with a history of 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 The patient's previous treatment procedures include primary Taxol/carbo with retreatment in 2014 and Doxil/Avastin, topotecan, gemzar and now single agent weekly Taxol initiated 5/8/18 following progression with chest wall involvement. Imaging EOD CT:  9/22/17  FINDINGS:   LUNG BASES: Dependent atelectasis. Otherwise clear. INCIDENTALLY IMAGED HEART AND MEDIASTINUM: Unremarkable. LIVER: No mass or biliary dilatation. GALLBLADDER: Unremarkable. SPLEEN: No mass. PANCREAS: No mass or ductal dilatation. ADRENALS: Unremarkable. KIDNEYS: Nonobstructing stones measuring 2 to 3 mm in both kidneys with at least  7 right and 3 left stones. Left renal hilar fullness seen on prior unenhanced CT  is no longer evident and I suspect this was due to mild hydronephrosis and renal  pelvis distention. STOMACH: Unremarkable. SMALL BOWEL: No dilatation or wall thickening. COLON: No dilatation or wall thickening. APPENDIX: Unremarkable. PERITONEUM: Multiple peritoneal implants are redemonstrated with a 16 x 22 mm  lesion anterior to the level of the liver, multiple left omental lesions more  inferiorly measuring up to 2 x 2.4 cm, and multiple implants along the inferior  margin of the right lower liver measuring up to 3.4 x 3.6 cm. Additionally there  is a implant in the right paracolic gutter measuring 2.1 x 2.2 cm. Intrapelvic  masses measure up to 3.5 x 4.0 cm and the rectovaginal space. RETROPERITONEUM: Extensive bulky adenopathy with rocio hepatis adenopathy  measuring up to 5.0 x 6.5 cm and a mass along the tail of the pancreas measuring  proximally 4.0 x 5.2 cm. Aortocaval adenopathy measures up to 3.6 x 5.0 cm. Numerous pelvic adenopathy measures up to 3.3 x 5.2 cm in the left external  iliac chain and 2.7 x 2.0 cm in the right internal iliac chain.   REPRODUCTIVE ORGANS: Uterus and ovaries are surgically absent. URINARY BLADDER: No mass or calculus. BONES: Stable degenerative spine change with endplate sclerosis and  benign-appearing lucencies within the L2 and L4 vertebral bodies, likely  reflecting hemangioma. ADDITIONAL COMMENTS: There is skin thickening in the anterior lower abdominal  wall bilaterally and expected appearance to abdominal postsurgical incisional  change in the midline.     IMPRESSION  IMPRESSION:  1. Left renal hilar fullness is likely due to left renal pelvis distention on  prior exam which has since resolved. 2. Extensive intra-abdominal and nkechi metastatic disease appears grossly stable  as described above. 3. Intra-abdominal wall skin thickening may reflect cellulitis. Correlate with  clinical exam.      CT chest 4/2018:  FINDINGS:  LUNGS: There is a rounded patchy area of airspace process in the periphery of  the superior segment of the right lower lobe as well as patchy areas along the  periphery just inferior to it. Similar finding noted in the periphery of the  lingula as well as more centrally in the lingula. These may be areas of acute  airspace process. [place] PLEURA: There is a chest wall mass which lies along the right side of the  proximal body of the sternum which has pleural base and extends through the  chest wall as well as eroding the adjacent sternum. On axial imaging this mass  is 4.9 x 4.4 cm in size. This may account for the patient's right chest wall  symptoms. TRACHEA/BRONCHI: Patent      The absence of intravenous contrast reduces the sensitivity for evaluation of  the mediastinum and upper abdominal organs.     MEDIASTINUM/SWATI: There is massive adenopathy in the superior mediastinum as  well as adenopathy which extends into the hilar regions bilaterally and in the  subcarinal region. Adenopathy is also identified in the pericardial lymph nodes  on the right. Largest of these measures 2.1 cm.  There is also an enlarged lymph  node along the left posterior margin of the manubrium/body of the sternum which  measures 2.9 cm. .     AORTA:  The aorta has a normal contour with no evidence of aneurysm.     UPPER ABDOMEN: On previous abdominal CT adenopathy was noted in the upper  abdomen. There are retrocrural lymph nodes as well the largest of which on the  left measures 1.4 x 3.7 cm. Previously this lymph node measured 0.8 x 3.1 cm. There is marked adenopathy in the celiac axis extending into the hepatic hilus. This also extends into the area of the falciform ligament. This is more  extensive than on the previous study. There is also perigastric adenopathy on  the left which is now measures approximately 5.3 x 7.4 cm. Previously this was  5.2 x 4.0 cm. .     BONES: As mentioned previously there is lytic erosion along the right side of  the proximal body of the sternum adjacent to the mass. There is a lucency in the  T4 vertebral body.     IMPRESSION:    1. There is a chest wall mass in the right parasternal region which invades the  sternum and is seen on both the internal internal as well as external sides of  the chest wall. This may account for the patient's symptoms of right-sided chest  pain. 2. There are patchy areas of airspace process bilaterally which may represent  areas of acute process in the to be correlated clinically. 3. Massive adenopathy in the mediastinum noted. 4. Increasing adenopathy in the retrocrural region and upper abdomen. .  Active, no restrictions. Pathology reported: CT bx chest wall mass 4/2018    Chest wall mass in parasternal region, CT-guided needle core biopsy with touch preps:   Metastatic high-grade serous carcinoma (see comment)   General Categorization   Positive for malignancy.        Cancer Ag (CA) 125   Date Value Ref Range Status   05/03/2018 226.1 (H) 0.0 - 38.1 U/mL Final     Comment:     Roche ECLIA methodology   03/28/2018 271.7 (H) 0.0 - 38.1 U/mL Final     Comment:     Roche ECLIA methodology   08/28/2017 183.8 (H) 0.0 - 38.1 U/mL Final     Comment:     Roche ECLIA methodology   07/28/2017 157.4 (H) 0.0 - 38.1 U/mL Final     Comment:     Roche ECLIA methodology   02/28/2017 259.0 (H) 0.0 - 38.1 U/mL Final     Comment:     Roche ECLIA methodology   01/30/2017 347.1 (H) 0.0 - 38.1 U/mL Final     Comment:     Roche ECLIA methodology       SUBJECTIVE:  Returns for prechemo doing well. She continues to work full time driving/transport. Remains very active working out at gym and supported by 3 sons and friends. She last her  last May. She is in counseling, has antidepression therapy and well controlled. She gambles from time to time and is excited for these trips. Remains very pleased with control of her sleep apnea with CPAP and her DM with A1C recently to 5.2. She reports mild chest discomfort with the disease. Rare nausea, no emesis. ROS  Constitutional: no weight loss, fever, night sweats  Respiratory: no cough, shortness of breath, or wheezing  Cardiovascular: no chest pain or dyspnea on exertion  Heme: No abnormal bleeding  Gastrointestinal: no abdominal pain, change in bowel habits, or black or bloody stools  Genito-Urinary: no dysuria, trouble voiding, or hematuria  Musculoskeletal: negative for - swelling in ankle - bilateral  Neurological: negative for - mild neuropathy  Derm: pigmentation/induration medial left leg. Prior injury from fall  Psych: positive for depression - controlled        PMH:  Past Medical History:   Diagnosis Date    Adverse effect of anesthesia     sleep apnea cpap machine useage     Anemia     Arthritis     DDD low back and knees    Asthma     uses albuterol prn only; none x 6 mos.   Never hospitalized    BRCA negative 1/2013    Calculus of kidney     Chronic kidney disease     kidney stones    Chronic pain     knee pain bilat    CINV (chemotherapy-induced nausea and vomiting) 8/21/2014    Decreased hearing, right     PATIENT STATES THIS IS DUE TO CHEMOTHERAPY    Diabetes ([facility] Age 46 IDDM    Environmental allergies     Fibromyalgia     GERD (gastroesophageal reflux disease)     controlled with med    Hypertension     Ill-defined condition     Sepsis  -  SOURCE PORT    Ill-defined condition 2016    chemotherapy     Mass of chest wall 2018    Morbid obesity ([address]     Murmur, heart     Other and unspecified hyperlipidemia     Ovarian cancer ([facility] 2012, 2014    serous, high grade, stage IIIc. s/p chemotx (has port)    Psychiatric disorder     Rectal bleeding     Thromboembolus ([address]     POST PARTUM; resolved- PELVIS    Thyroid disease     HYPOTHYROID    Unspecified sleep apnea     CPAP, Dr. Mark Murry     PSH:  Past Surgical History:   Procedure Laterality Date    BREAST SURGERY PROCEDURE UNLISTED      Lymph node in left breast 2017    CHEST SURGERY PROCEDURE UNLISTED      Placement of port a cath right chest    HX GYN       X2    HX HYSTERECTOMY  2012    ROULA/BSO, tumor debulking, omentectomy for ovarian cancer    HX ORTHOPAEDIC      ankle-FX, R    HX ORTHOPAEDIC      FX R ARM    HX UROLOGICAL      stent    HX VASCULAR ACCESS  2015    right chest- port placed     SOC:  Social History     Social History    Marital status:      Spouse name: N/A    Number of children: N/A    Years of education: N/A     Occupational History    Not on file. Social History Main Topics    Smoking status: Never Smoker    Smokeless tobacco: Never Used    Alcohol use Yes      Comment: 2 DRINKS EVERY 2 MONTHS    Drug use: No    Sexual activity: Yes     Other Topics Concern    Not on file     Social History Narrative    Lives in [facility] alone.  passed away in  of a heart attack. Has 2 sons. Disability. 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 Enjoys swimming and going to the gym.      Family History  Family History   Problem Relation Age of Onset    Hypertension Mother     Arthritis-osteo Mother     Lung Disease Father COPD    Hypertension Father     Arthritis-osteo Father     Cancer Father      PROSTATE    Hypertension Brother     Elevated Lipids Brother     Arthritis-osteo Brother     Hypertension Brother     Elevated Lipids Brother     Obesity Brother     Cancer Brother      PROSTATE    Anesth Problems Son      DELAYED AWAKENING    Diabetes Maternal Grandmother     Anesth Problems Paternal Grandmother      [address] STOPPED DURING SURGERY     Medications: (reviewed)  Current Outpatient Prescriptions on File Prior to Visit   Medication Sig Dispense Refill    valsartan (DIOVAN) 80 mg tablet TAKE 1 TAB BY MOUTH DAILY. 30 Tab 4    LINZESS 145 mcg cap capsule TAKE ONE CAPSULE BY MOUTH EVERY DAY 30 Cap 0    Dexlansoprazole (DEXILANT) 60 mg CpDB Take 1 Cap by mouth daily. 90 Cap 3    LUBRICANT EYE 57.3-42.5 % oint ophthalmic ointment APPLY TOPICALLY INTO BOTH EYES EVERY DAY FOR 3 MONTHS  3    HYDROcodone-acetaminophen (NORCO) 5-325 mg per tablet Take 1 Tab by mouth every four (4) hours as needed for Pain. Max Daily Amount: 6 Tabs. 30 Tab 0    levothyroxine (SYNTHROID) 150 mcg tablet TAKE 1 TAB BY MOUTH DAILY (BEFORE BREAKFAST). --[facility] [phone] FOR APPOINTMENT FOR MORE REFILLS 90 Tab 0    Liraglutide (VICTOZA) 0.6 mg/0.1 mL (18 mg/3 mL) pnij 1.8 mg by SubCUTAneous route daily (with lunch). 3 Pen 3    insulin detemir (LEVEMIR FLEXTOUCH) 100 unit/mL (3 mL) inpn INJECT 30 UNITS IN AM AND 50 UNITS AT NIGHT. INCREASE AS DIRECTED TO  UNITS PER DAY--CALL [phone] FOR APPOINTMENT FOR MORE REFILLS 30 mL 3    insulin aspart (NOVOLOG FLEXPEN) 100 unit/mL inpn INJECT 20 UNITS BEFORE EACH MEAL + 4 UNITS FOR EVERY 50 MG/DL ABOVE 150 MG/DL.  UNITS PER DAY 30 mL 11    potassium chloride (KLOR-CON M10) 10 mEq tablet TAKE 1 TAB BY MOUTH TWO (2) DAYS A WEEK.  WED AND FRI 90 Tab 1    omega-3 acid ethyl esters (LOVAZA) 1 gram capsule TAKE ONE CAPSULE BY MOUTH TWICE A  Cap 2    aspirin delayed-release 81 mg tablet Take  by mouth daily.  BD INSULIN PEN NEEDLE UF SHORT 31 gauge x 5/16\" ndle USE WITH INSULIN TO INJECT FIVE TIMES DAILY. 200 Pen Needle 11    clonazePAM (KLONOPIN) 0.5 mg tablet Take 0.5 mg by mouth nightly as needed.  cycloSPORINE (RESTASIS) 0.05 % ophthalmic emulsion 1 Drop two (2) times a day.  sertraline (ZOLOFT) 50 mg tablet TAKE 1 TABLET BY MOUTH EVERY MORNING  1    cholecalciferol, VITAMIN D3, (VITAMIN D3) 5,000 unit tab tablet Take 5,000 Units by mouth daily.  furosemide (LASIX) 40 mg tablet Take 80 mg by mouth as needed. 3    montelukast (SINGULAIR) 10 mg tablet TAKE 1 TAB BY MOUTH DAILY. 90 Tab 1    lidocaine-prilocaine (EMLA) topical cream Apply small amount over port area one hour before chemo treatment and cover with a Band-Aid 30 g 1    naloxone (EVZIO) 2 mg/0.4 mL auto-injector 0.4 mL by IntraMUSCular route once as needed for Overdose for up to 1 dose. 1 Device 1    SYMBICORT 160-4.5 mcg/actuation HFAA Take 2 Puffs by inhalation two (2) times daily as needed. 2 Inhaler 3    diphenhydrAMINE (BENADRYL) 25 mg capsule Take 25 mg by mouth as needed.  albuterol (PROVENTIL VENTOLIN) 2.5 mg /3 mL (0.083 %) nebulizer solution USE 1 VAIL VIA NEBULIZER EVERY 4 HOURS AS NEEDED FOR WHEEZING 3 Package 1    ondansetron (ZOFRAN ODT) 8 mg disintegrating tablet Take 1 Tab by mouth every eight (8) hours as needed for Nausea. 30 Tab 3    fluticasone (FLONASE) 50 mcg/actuation nasal spray 2 Sprays by Both Nostrils route daily.  nystatin (MYCOSTATIN) powder Apply  to affected area four (4) times daily as needed.  albuterol (PROVENTIL HFA, VENTOLIN HFA, PROAIR HFA) 90 mcg/actuation inhaler Take 2 Puffs by inhalation every four (4) hours as needed for Wheezing.  fexofenadine (ALLEGRA) 180 mg tablet Take 180 mg by mouth daily. No current facility-administered medications on file prior to visit.       Allergies: (reviewed)  Allergies Allergen Reactions    Iodinated Contrast- Oral And Iv Dye Rash     13 hr pre-medication prior to IV contrast    Carboplatin Other (comments)     Patient developed shortness of breath, felt faint, coughing, skin flushed and \"itchy\". This occurred on the patients 8th treatment.  Lipitor [Atorvastatin] Myalgia    Percocet [Oxycodone-Acetaminophen] Other (comments)     fever    Shellfish Containing Products Hives    Tape [Adhesive] Itching and Other (comments)     \"op site-- clear,thin tape\"--caused blister     Tomato Hives       OBJECTIVE:  PHYSICAL EXAM  VITAL SIGNS: Vitals:    05/31/18 1012 05/31/18 1019   BP: (!) 178/92 138/80   Pulse: 82 76   Weight: 304 lb 6.4 oz (138.1 kg)    Height: 5' 8\" (1.727 m)      Body mass index is 46.28 kg/(m^2). GENERAL BRIAN: Conversant, alert, oriented. NAD   HEENT: Normocephalic. Oropharynx clear. Neck: Supple. Trachea midline, no JVD   RESPIRATORY: Lung CTA, No rales/rhonchi. Adequate respiratory effort   CARDIOVASC: Reg rate/rhythm. Right parasternal firmness, ill defined, mildly tender   GASTROINT: soft, non-tender, without masses or organomegaly, no fluid wave. MUSCULOSKEL: FROM. No focal tenderness. EXTREMITIES: bilat 1+ edema   PELVIC: Exam deferred. RECTAL: Deferred   ALCON SURVEY: Negative throughout---neck, inguina. NEURO: Grossly intact, no acute deficit. Oriented. Not depressed. Not agitated.      Physical Exam    DATE REVIEW as available:  Lab Results   Component Value Date/Time    WBC 6.8 05/22/2018 10:13 AM    Hemoglobin (POC) 10.5 (L) 03/20/2017 12:15 PM    HGB 8.4 (L) 05/22/2018 10:13 AM    Hematocrit (POC) 31 (L) 03/20/2017 12:15 PM    HCT 27.4 (L) 05/22/2018 10:13 AM    PLATELET 383 08/07/9915 10:13 AM    MCV 93.8 05/22/2018 10:13 AM     Lab Results   Component Value Date/Time    Sodium 140 05/03/2018 11:15 AM    Potassium 4.7 05/03/2018 11:15 AM    Chloride 105 05/03/2018 11:15 AM    CO2 21 05/03/2018 11:15 AM    Anion gap 7 04/11/2018 04:34 AM    Glucose 120 (H) 05/03/2018 11:15 AM    BUN 28 (H) 05/03/2018 11:15 AM    Creatinine 1.34 (H) 05/03/2018 11:15 AM    BUN/Creatinine ratio 21 05/03/2018 11:15 AM    GFR est AA 51 (L) 05/03/2018 11:15 AM    GFR est non-AA 44 (L) 05/03/2018 11:15 AM    Calcium 9.6 05/03/2018 11:15 AM    Bilirubin, total 0.4 05/03/2018 11:15 AM    AST (SGOT) 29 05/03/2018 11:15 AM    Alk. phosphatase 73 05/03/2018 11:15 AM    Protein, total 8.6 (H) 05/03/2018 11:15 AM    Albumin 4.1 05/03/2018 11:15 AM    Globulin 5.8 (H) 04/10/2018 07:33 PM    A-G Ratio 0.9 (L) 05/03/2018 11:15 AM    ALT (SGPT) 16 05/03/2018 11:15 AM          IMPRESSION AND PLAN:  Shefali Browning has a working diagnosis of     ICD-10-CM ICD-9-CM    1. Malignant neoplasm of ovary, unspecified laterality ([facility] C56.9 183.0    2. On antineoplastic chemotherapy Z79.899 V58.69    3. Type 2 diabetes mellitus with nephropathy (HCC) E11.21 250.40      583.81    4. Obstructive sleep apnea syndrome G47.33 327.23    5. Reactive depression F32.9 300.4    6. Chest wall mass R22.2 786.6    7. Stage 3 chronic kidney disease N18.3 585. 3    Karnofsky Score: 100   ECOG stGstrstastdstest:st st1st Continue salvage therapy Taxol, anticipate cycle 2. Mild drop . Tolerating. Lab studies next week. Continue to monitor renal function  Managing her disease(s) well, remains fully active and independent. Discussed her EOD and encouraged her continue with what she enjoys and working. Will consider doing catch-up genetic panel testing.       MALLIKA Hernandez  5/31/2018/10:16 AM

## 2018-05-31 NOTE — PROGRESS NOTES
pre chemo visit for C2W1 at [facility] on 6/5/18, pt states she may need a stronger dose of the Norco, Initial blood pressure reading 178/92, repeat blood pressure reading 138/80

## 2018-06-05 ENCOUNTER — HOSPITAL ENCOUNTER (OUTPATIENT)
Dept: INFUSION THERAPY | Age: 58
Discharge: HOME | End: 2018-06-05
Payer: MEDICARE

## 2018-06-05 VITALS
HEART RATE: 78 BPM | WEIGHT: 293 LBS | DIASTOLIC BLOOD PRESSURE: 78 MMHG | HEIGHT: 68 IN | SYSTOLIC BLOOD PRESSURE: 128 MMHG | BODY MASS INDEX: 44.41 KG/M2 | RESPIRATION RATE: 18 BRPM | TEMPERATURE: 98.3 F

## 2018-06-05 LAB
ALBUMIN SERPL-MCNC: 3.3 G/DL (ref 3.5–5)
ALBUMIN/GLOB SERPL: 0.7 {RATIO} (ref 1.1–2.2)
ALP SERPL-CCNC: 68 U/L (ref 45–117)
ALT SERPL-CCNC: 25 U/L (ref 12–78)
ANION GAP SERPL CALC-SCNC: 7 MMOL/L (ref 5–15)
AST SERPL-CCNC: 30 U/L (ref 15–37)
BASOPHILS # BLD: 0 K/UL (ref 0–0.1)
BASOPHILS NFR BLD: 0 % (ref 0–1)
BILIRUB SERPL-MCNC: 0.4 MG/DL (ref 0.2–1)
BUN SERPL-MCNC: 20 MG/DL (ref 6–20)
BUN/CREAT SERPL: 17 (ref 12–20)
CALCIUM SERPL-MCNC: 9.1 MG/DL (ref 8.5–10.1)
CANCER AG125 SERPL-ACNC: 96 U/ML (ref 1.5–35)
CHLORIDE SERPL-SCNC: 111 MMOL/L (ref 97–108)
CO2 SERPL-SCNC: 25 MMOL/L (ref 21–32)
CREAT SERPL-MCNC: 1.17 MG/DL (ref 0.55–1.02)
DIFFERENTIAL METHOD BLD: ABNORMAL
EOSINOPHIL # BLD: 0.2 K/UL (ref 0–0.4)
EOSINOPHIL NFR BLD: 2 % (ref 0–7)
ERYTHROCYTE [DISTWIDTH] IN BLOOD BY AUTOMATED COUNT: 14.3 % (ref 11.5–14.5)
GLOBULIN SER CALC-MCNC: 4.8 G/DL (ref 2–4)
GLUCOSE SERPL-MCNC: 130 MG/DL (ref 65–100)
HCT VFR BLD AUTO: 27.2 % (ref 35–47)
HGB BLD-MCNC: 8.3 G/DL (ref 11.5–16)
IMM GRANULOCYTES # BLD: 0 K/UL (ref 0–0.04)
IMM GRANULOCYTES NFR BLD AUTO: 0 % (ref 0–0.5)
LYMPHOCYTES # BLD: 2.1 K/UL (ref 0.8–3.5)
LYMPHOCYTES NFR BLD: 27 % (ref 12–49)
MAGNESIUM SERPL-MCNC: 1.6 MG/DL (ref 1.6–2.4)
MCH RBC QN AUTO: 29.2 PG (ref 26–34)
MCHC RBC AUTO-ENTMCNC: 30.5 G/DL (ref 30–36.5)
MCV RBC AUTO: 95.8 FL (ref 80–99)
MONOCYTES # BLD: 0.6 K/UL (ref 0–1)
MONOCYTES NFR BLD: 7 % (ref 5–13)
NEUTS SEG # BLD: 4.9 K/UL (ref 1.8–8)
NEUTS SEG NFR BLD: 63 % (ref 32–75)
NRBC # BLD: 0 K/UL (ref 0–0.01)
NRBC BLD-RTO: 0 PER 100 WBC
PLATELET # BLD AUTO: 165 K/UL (ref 150–400)
PMV BLD AUTO: 9.4 FL (ref 8.9–12.9)
POTASSIUM SERPL-SCNC: 3.7 MMOL/L (ref 3.5–5.1)
PROT SERPL-MCNC: 8.1 G/DL (ref 6.4–8.2)
RBC # BLD AUTO: 2.84 M/UL (ref 3.8–5.2)
SODIUM SERPL-SCNC: 143 MMOL/L (ref 136–145)
WBC # BLD AUTO: 7.8 K/UL (ref 3.6–11)

## 2018-06-05 PROCEDURE — 85025 COMPLETE CBC W/AUTO DIFF WBC: CPT | Performed by: OBSTETRICS & GYNECOLOGY

## 2018-06-05 PROCEDURE — 74011250636 HC RX REV CODE- 250/636: Performed by: OBSTETRICS & GYNECOLOGY

## 2018-06-05 PROCEDURE — 36415 COLL VENOUS BLD VENIPUNCTURE: CPT | Performed by: OBSTETRICS & GYNECOLOGY

## 2018-06-05 PROCEDURE — 83735 ASSAY OF MAGNESIUM: CPT | Performed by: NURSE PRACTITIONER

## 2018-06-05 PROCEDURE — 96413 CHEMO IV INFUSION 1 HR: CPT

## 2018-06-05 PROCEDURE — 80053 COMPREHEN METABOLIC PANEL: CPT | Performed by: OBSTETRICS & GYNECOLOGY

## 2018-06-05 PROCEDURE — 74011000258 HC RX REV CODE- 258: Performed by: OBSTETRICS & GYNECOLOGY

## 2018-06-05 PROCEDURE — 74011000250 HC RX REV CODE- 250: Performed by: OBSTETRICS & GYNECOLOGY

## 2018-06-05 PROCEDURE — 77030012965 HC NDL HUBR BBMI -A

## 2018-06-05 PROCEDURE — 96375 TX/PRO/DX INJ NEW DRUG ADDON: CPT

## 2018-06-05 PROCEDURE — 86304 IMMUNOASSAY TUMOR CA 125: CPT | Performed by: OBSTETRICS & GYNECOLOGY

## 2018-06-05 RX ORDER — HEPARIN 100 UNIT/ML
500 SYRINGE INTRAVENOUS AS NEEDED
Status: ACTIVE | OUTPATIENT
Start: 2018-06-05 | End: 2018-06-06

## 2018-06-05 RX ORDER — SODIUM CHLORIDE 0.9 % (FLUSH) 0.9 %
5-10 SYRINGE (ML) INJECTION AS NEEDED
Status: ACTIVE | OUTPATIENT
Start: 2018-06-05 | End: 2018-06-06

## 2018-06-05 RX ORDER — SODIUM CHLORIDE 9 MG/ML
10 INJECTION INTRAMUSCULAR; INTRAVENOUS; SUBCUTANEOUS AS NEEDED
Status: ACTIVE | OUTPATIENT
Start: 2018-06-05 | End: 2018-06-06

## 2018-06-05 RX ADMIN — DIPHENHYDRAMINE HYDROCHLORIDE 50 MG: 50 INJECTION INTRAMUSCULAR; INTRAVENOUS at 11:45

## 2018-06-05 RX ADMIN — PACLITAXEL 120 MG: 6 INJECTION, SOLUTION INTRAVENOUS at 12:39

## 2018-06-05 RX ADMIN — FAMOTIDINE 20 MG: 10 INJECTION, SOLUTION INTRAVENOUS at 11:42

## 2018-06-05 RX ADMIN — SODIUM CHLORIDE, PRESERVATIVE FREE 500 UNITS: 5 INJECTION INTRAVENOUS at 13:51

## 2018-06-05 RX ADMIN — SODIUM CHLORIDE 10 ML: 9 INJECTION INTRAMUSCULAR; INTRAVENOUS; SUBCUTANEOUS at 10:50

## 2018-06-05 RX ADMIN — Medication 10 ML: at 10:50

## 2018-06-05 RX ADMIN — DEXAMETHASONE SODIUM PHOSPHATE 10 MG: 4 INJECTION, SOLUTION INTRAMUSCULAR; INTRAVENOUS at 11:40

## 2018-06-05 RX ADMIN — Medication 10 ML: at 13:51

## 2018-06-05 NOTE — PROGRESS NOTES
Jt Clancy. Mary, NP  MALLIKA Raphael      Date of visit: 6/5/2018       HPI:   Ms. Suzie Reynaga is a long time pt of Dr. Tori Cordova. She was initially dx with ovarian cancer in Sept 2012. She had MARAH/BSO/Cytoreductive surgery on 9/21/12. At that time she was Stage III C. She recovered and began 6 cycles of Carbo/Taxol. Had good response, but reoccurred and in Fall of 2014 she received 3 cycles of Carbo/Taxol before having a reaction to Carbo and this was stopped. She continued her next 3 cycles with Doxil/Avastin. This did not decrease disease. .   She had a treatment break from 3/2015 until she restarted with Topatecan for 11 cycles from July 2015 through March 2016. She was followed by Dr. Tori Cordova and in Jan 2017, she had a CT scan that showed recurrence of disease. She was given Gemzar with good results. In April 2018, she presented to the [facility] ER with c/o CP and a Chest CT  showed chest wall mass in the right parasternal region that invades the sternum with \"massive adenopathy in the mediastinum and increasing adenopathy in the retrocrural region and upper abd. CT guided bx of chest wall mass was performed that showed Metastatic high-grade serous carcinoma   At this point, Dr. Tori Cordova recommended weekly Taxol D1, 8, 15 (he discussed the palliative nature of this treatment)  She began this on 5/8/18. Subjective: Today she presents for C2 W1. Her son is again present and supportive. Pt says she is feeling very good on this treatment and is able to continue to be active and \"normal\" and she is very please with this  Pt's CA-125 has gone from 226 on 5/3/18 down to 96 today. Pt was tearfully happy about this. Creat improved to 1.17 today from 1.34 5/3.  Pt says she has been drinking water much better lately      Diagnosis: Ovarian cancer  Original Surgery: 9/21/12 Ex-lap, ROULA/BSO, radical tumor debulking  Pathology: High grade serious ovarian cancer with omental cake and carcinomatosis. Stage IIIC    Oncology treatment history:  9/2012: Dx with ovarian cancer in Sept 2012. She had MARAH/BSO/Cytoreductive surgery on 9/21/12. At that time she was Stage III C  10/2012-2/2013: 6 cycles Carbo/Taxol  8/2014-11/2014: 6 Cycles carbo/Taxol  3/2015-5/13/15: 3 cycles Avastin/Doxil until progression  7/2015-8/2015 Weekly Topotecan   12/2015-3/2016: Weekly Topotecan  2/2017-9/2017:  Gemzar D1/D8 Q28 days for 6 cycles  5/8/2018-Present: Taxol  D1,8,15 for scheduled 3  cycles      Current Outpatient Prescriptions   Medication Sig    valsartan (DIOVAN) 80 mg tablet TAKE 1 TAB BY MOUTH DAILY.  montelukast (SINGULAIR) 10 mg tablet TAKE 1 TAB BY MOUTH DAILY.  LINZESS 145 mcg cap capsule TAKE ONE CAPSULE BY MOUTH EVERY DAY    Dexlansoprazole (DEXILANT) 60 mg CpDB Take 1 Cap by mouth daily.  lidocaine-prilocaine (EMLA) topical cream Apply small amount over port area one hour before chemo treatment and cover with a Band-Aid    LUBRICANT EYE 57.3-42.5 % oint ophthalmic ointment APPLY TOPICALLY INTO BOTH EYES EVERY DAY FOR 3 MONTHS    HYDROcodone-acetaminophen (NORCO) 5-325 mg per tablet Take 1 Tab by mouth every four (4) hours as needed for Pain. Max Daily Amount: 6 Tabs.  naloxone (EVZIO) 2 mg/0.4 mL auto-injector 0.4 mL by IntraMUSCular route once as needed for Overdose for up to 1 dose.  levothyroxine (SYNTHROID) 150 mcg tablet TAKE 1 TAB BY MOUTH DAILY (BEFORE BREAKFAST). --JQUY [phone] FOR APPOINTMENT FOR MORE REFILLS    Liraglutide (VICTOZA) 0.6 mg/0.1 mL (18 mg/3 mL) pnij 1.8 mg by SubCUTAneous route daily (with lunch).  insulin detemir (LEVEMIR FLEXTOUCH) 100 unit/mL (3 mL) inpn INJECT 30 UNITS IN AM AND 50 UNITS AT NIGHT.  INCREASE AS DIRECTED TO  UNITS PER DAY--CALL [phone] FOR APPOINTMENT FOR MORE REFILLS    insulin aspart (NOVOLOG FLEXPEN) 100 unit/mL inpn INJECT 20 UNITS BEFORE EACH MEAL + 4 UNITS FOR EVERY 50 MG/DL ABOVE 150 MG/DL.  UNITS PER DAY    potassium chloride (KLOR-CON M10) 10 mEq tablet TAKE 1 TAB BY MOUTH TWO (2) DAYS A WEEK. WED AND FRI    SYMBICORT 160-4.5 mcg/actuation HFAA Take 2 Puffs by inhalation two (2) times daily as needed.  omega-3 acid ethyl esters (LOVAZA) 1 gram capsule TAKE ONE CAPSULE BY MOUTH TWICE A DAY    diphenhydrAMINE (BENADRYL) 25 mg capsule Take 25 mg by mouth as needed.  albuterol (PROVENTIL VENTOLIN) 2.5 mg /3 mL (0.083 %) nebulizer solution USE 1 VAIL VIA NEBULIZER EVERY 4 HOURS AS NEEDED FOR WHEEZING    ondansetron (ZOFRAN ODT) 8 mg disintegrating tablet Take 1 Tab by mouth every eight (8) hours as needed for Nausea.  aspirin delayed-release 81 mg tablet Take  by mouth daily.  BD INSULIN PEN NEEDLE UF SHORT 31 gauge x 5/16\" ndle USE WITH INSULIN TO INJECT FIVE TIMES DAILY.  fluticasone (FLONASE) 50 mcg/actuation nasal spray 2 Sprays by Both Nostrils route daily.  clonazePAM (KLONOPIN) 0.5 mg tablet Take 0.5 mg by mouth nightly as needed.  nystatin (MYCOSTATIN) powder Apply  to affected area four (4) times daily as needed.  cycloSPORINE (RESTASIS) 0.05 % ophthalmic emulsion 1 Drop two (2) times a day.  sertraline (ZOLOFT) 50 mg tablet TAKE 1 TABLET BY MOUTH EVERY MORNING    cholecalciferol, VITAMIN D3, (VITAMIN D3) 5,000 unit tab tablet Take 5,000 Units by mouth daily.  furosemide (LASIX) 40 mg tablet Take 80 mg by mouth as needed.  albuterol (PROVENTIL HFA, VENTOLIN HFA, PROAIR HFA) 90 mcg/actuation inhaler Take 2 Puffs by inhalation every four (4) hours as needed for Wheezing.  fexofenadine (ALLEGRA) 180 mg tablet Take 180 mg by mouth daily.        Current Facility-Administered Medications   Medication Dose Route Frequency    heparin (porcine) pf 500 Units  500 Units IntraVENous PRN    sodium chloride 0.9% injection 10 mL  10 mL IntraVENous PRN    sodium chloride (NS) flush 5-10 mL  5-10 mL IntraVENous PRN        Review of Systems:  General: She is in good spirits with her son present  HEENT: Denies visual changes, dysphagia or headache   Resp: Denies SOB, or change in ECKERT, wheezing or cough  CV: Denies CP, palpitations. Continues with chest pressure where she has her sternal mass. She says it is the same discomfort as she has had since they discovered it and denies it changing or radiating   GI/: Denies N/V, diarrhea, constipation or dysuria   MuskSkel:Continues with DJD knee pain. She continues to complains of left leg swelling and discomfort to lower leg. Says she is keeping it elevated (dopplers negative on 5/23). Says she is trying to use [place] stocking as instructed by PA, but says she has not seen a difference. Denies numbness, feeling of coolness or weakness. None to right   Neuro: Denies dizzyness or syncope    Objective:     Patient Vitals for the past 8 hrs:   BP Temp Pulse Resp Height Weight   06/05/18 1352 - 98.3 °F (36.8 °C) 78 18 - -   06/05/18 1015 128/78 97.6 °F (36.4 °C) 82 18 5' 8\" (1.727 m) 305 lb 6.4 oz (138.5 kg)       Physical Exam:  General: A&O X3 in NAD with pleasant affect. HEENT: Sclera anicteric, pupils equal and reactive to light. Mucosa pink, moist without lesions  Neck: No JVD. No cervical adenopathy appreciated. Heart: Regular with persistent, unchanged 2-3/6 murmur. Mild parasternal firmness persist, but less tender per pt with palpation   Lungs: CTA Bilat without wheezing or rales. Again, decreased to bases due to effort, improves with encouragement of taking deep breath   Abd: Soft, obese, NT/ND with + BS throughout. Ext: Left lower ext with good pulses and color. 3+ non-pitting edema noted .   Right leg with + pedal pulses with 2+ edema   Neuro: grossly intact    Recent Results (from the past 12 hour(s))   CBC WITH AUTOMATED DIFF    Collection Time: 06/05/18 10:28 AM   Result Value Ref Range    WBC 7.8 3.6 - 11.0 K/uL    RBC 2.84 (L) 3.80 - 5.20 M/uL    HGB 8.3 (L) 11.5 - 16.0 g/dL    HCT 27.2 (L) 35.0 - 47.0 %    MCV 95.8 80.0 - 99.0 FL    MCH 29.2 26.0 - 34.0 PG    MCHC 30.5 30.0 - 36.5 g/dL    RDW 14.3 11.5 - 14.5 %    PLATELET 322 126 - 762 K/uL    MPV 9.4 8.9 - 12.9 FL    NRBC 0.0 0  WBC    ABSOLUTE NRBC 0.00 0.00 - 0.01 K/uL    NEUTROPHILS 63 32 - 75 %    LYMPHOCYTES 27 12 - 49 %    MONOCYTES 7 5 - 13 %    EOSINOPHILS 2 0 - 7 %    BASOPHILS 0 0 - 1 %    IMMATURE GRANULOCYTES 0 0.0 - 0.5 %    ABS. NEUTROPHILS 4.9 1.8 - 8.0 K/UL    ABS. LYMPHOCYTES 2.1 0.8 - 3.5 K/UL    ABS. MONOCYTES 0.6 0.0 - 1.0 K/UL    ABS. EOSINOPHILS 0.2 0.0 - 0.4 K/UL    ABS. BASOPHILS 0.0 0.0 - 0.1 K/UL    ABS. IMM. GRANS. 0.0 0.00 - 0.04 K/UL    DF AUTOMATED     METABOLIC PANEL, COMPREHENSIVE    Collection Time: 06/05/18 10:28 AM   Result Value Ref Range    Sodium 143 136 - 145 mmol/L    Potassium 3.7 3.5 - 5.1 mmol/L    Chloride 111 (H) 97 - 108 mmol/L    CO2 25 21 - 32 mmol/L    Anion gap 7 5 - 15 mmol/L    Glucose 130 (H) 65 - 100 mg/dL    BUN 20 6 - 20 MG/DL    Creatinine 1.17 (H) 0.55 - 1.02 MG/DL    BUN/Creatinine ratio 17 12 - 20      GFR est AA 58 (L) >60 ml/min/1.73m2    GFR est non-AA 48 (L) >60 ml/min/1.73m2    Calcium 9.1 8.5 - 10.1 MG/DL    Bilirubin, total 0.4 0.2 - 1.0 MG/DL    ALT (SGPT) 25 12 - 78 U/L    AST (SGOT) 30 15 - 37 U/L    Alk. phosphatase 68 45 - 117 U/L    Protein, total 8.1 6.4 - 8.2 g/dL    Albumin 3.3 (L) 3.5 - 5.0 g/dL    Globulin 4.8 (H) 2.0 - 4.0 g/dL    A-G Ratio 0.7 (L) 1.1 - 2.2     CANCER ANTIGEN 125    Collection Time: 06/05/18 10:28 AM   Result Value Ref Range    CA-125 96 (H) 1.5 - 35.0 U/mL   MAGNESIUM    Collection Time: 06/05/18 10:28 AM   Result Value Ref Range    Magnesium 1.6 1.6 - 2.4 mg/dL       IMAGING:  CT Chest 4/10/18  FINDINGS:  LUNGS: There is a rounded patchy area of airspace process in the periphery of  the superior segment of the right lower lobe as well as patchy areas along the  periphery just inferior to it.  Similar finding noted in the periphery of the  lingula as well as more centrally in the lingula. These may be areas of acute  airspace process. Yvan Antu PLEURA: There is a chest wall mass which lies along the right side of the  proximal body of the sternum which has pleural base and extends through the  chest wall as well as eroding the adjacent sternum. On axial imaging this mass  is 4.9 x 4.4 cm in size. This may account for the patient's right chest wall  symptoms. TRACHEA/BRONCHI: Patent      The absence of intravenous contrast reduces the sensitivity for evaluation of  the mediastinum and upper abdominal organs.     MEDIASTINUM/SWATI: There is massive adenopathy in the superior mediastinum as  well as adenopathy which extends into the hilar regions bilaterally and in the  subcarinal region. Adenopathy is also identified in the pericardial lymph nodes  on the right. Largest of these measures 2.1 cm. There is also an enlarged lymph  node along the left posterior margin of the manubrium/body of the sternum which  measures 2.9 cm. .     AORTA:  The aorta has a normal contour with no evidence of aneurysm.     UPPER ABDOMEN: On previous abdominal CT adenopathy was noted in the upper  abdomen. There are retrocrural lymph nodes as well the largest of which on the  left measures 1.4 x 3.7 cm. Previously this lymph node measured 0.8 x 3.1 cm. There is marked adenopathy in the celiac axis extending into the hepatic hilus. This also extends into the area of the falciform ligament. This is more  extensive than on the previous study. There is also perigastric adenopathy on  the left which is now measures approximately 5.3 x 7.4 cm. Previously this was  5.2 x 4.0 cm. .     BONES: As mentioned previously there is lytic erosion along the right side of  the proximal body of the sternum adjacent to the mass. There is a lucency in the  T4 vertebral body.       IMPRESSION:    1.  There is a chest wall mass in the right parasternal region which invades the  sternum and is seen on both the internal internal as well as external sides of  the chest wall. This may account for the patient's symptoms of right-sided chest  pain. 2. There are patchy areas of airspace process bilaterally which may represent  areas of acute process in the to be correlated clinically. 3. Massive adenopathy in the mediastinum noted. 4. Increasing adenopathy in the retrocrural region and upper abdomen. .        Pathology: 9/2012:  FINAL PATHOLOGIC DIAGNOSIS  1. Omentum, omentectomy:  Serous carcinoma, high grade  2. Soft tissue, umbilicus, biopsy:  Serous carcinoma, high grade  3. Peritoneum, periappendiceal, biopsy:  Serous carcinoma, high grade  4. Colonic mesentery, biopsy:  Serous carcinoma, high grade  See comment  5.  Uterus (157 grams), ovaries and fallopian tubes, supracervical hysterectomy and bilateral salpingooophorectomy:  Cervix: No diagnostic pathologic abnormality  Myometrium: No diagnostic pathologic abnormality  Endometrium: Benign polyp and cystic atrophy  Ovaries and fallopian tubes: Serous carcinoma, high grade  Serosa: Serous carcinoma implants, high-grade  See synoptic report  Synoptic Report:  Specimen: Uterus, ovaries and fallopian tubes, omentum  Procedure: Supracervical hysterectomy, bilateral salpingo-oophorectomy, omentectomy  Lymph node sampling: No lymph nodes present  Specimen integrity:  Right ovary: Intact        Assessment:     Patient Active Problem List   Diagnosis Code    Carcinomatosis ([address] C80.0    Controlled type 2 diabetes mellitus without complication, with long-term current use of insulin ([address] E11.9, Z79.4    Morbid obesity ([address] E66.01    Abnormal mammogram R92.8    Sleep apnea G47.30    Dyslipidemia (high LDL; low HDL) E78.5    Benign essential hypertension I10    Disorder of thyroid E07.9    CINV (chemotherapy-induced nausea and vomiting) R11.2, T45.1X5A    Swelling of eye, bilateral--left > right H57.8    Portacath in place--left Z95.828    Chest pain, atypical R07.89    Hx of pulmonary embolus during pregnancy Z86.711, Z87.59    Ovarian cancer (HCC) C56.9    Hyperglycemia due to type 2 diabetes mellitus ([facility] E11.65    Angina pectoris associated with type 2 diabetes mellitus ([facility] E11.59, I20.9    Renal calculi N20.0    Elevated BUN R79.9    Pulmonary hypertension, mild (HCC) I27.20    Lymphadenopathy, mediastinal R59.0    Type 2 diabetes mellitus with nephropathy (HCC) E11.21    Chest mass R22.2    Chest wall mass R22.2    On antineoplastic chemotherapy Z79.899    Reactive depression F32.9    Stage 3 chronic kidney disease N18.3         Plan:    Will proceed with C2 W1 of Taxol  Encouraged to continue to drink pleanty of water   Follow up with cardiologist as prn  If she has ANY chest pain or associated pain or concern, she was instructed to proceed directly to the ER  Follow up with PCP re-DM/HTN/Lipids as scheduled  Continue current meds  Hydration encouraged  Encouraged to call for any concerns or questions  Kim Jacobo NP

## 2018-06-05 NOTE — PROGRESS NOTES
[facility]fusion [facility] - Chemotherapy Progress Note    5493 Pt admit to [facility] for Paclitaxel. Patient continues to complain of LLE swelling. Port accessed with positive blood return. Labs drawn and sent for processing. Patient connected to saline KVO. Chemotherapy Flowsheet 6/5/2018   Cycle C2 D1   Date 6/5/2018   Drug / Regimen Pacitaxol   Dosage -   Pre Hydration -   Pre Meds given   Notes given         Visit Vitals    /78    Pulse 78    Temp 98.3 °F (36.8 °C)    Resp 18    Ht 5' 8\" (1.727 m)    Wt 138.5 kg (305 lb 6.4 oz)    LMP 09/07/2011    Breastfeeding No    BMI 46.44 kg/m2       Medications:  NS at [facility]  Benadryl 50 MG IVP  Pepcid 20 MG IVP  Decadron 10 MG IVP  Paclitaxel    1400 Pt tolerated treatment well. Port maintained positive blood return throughout treatment, flushed with positive blood return at conclusion and de-accessed. D/c home ambulatory in no distress. Pt aware of next [facility] appointment scheduled for 06/12/18 at 1000.

## 2018-06-06 RX ORDER — DEXAMETHASONE SODIUM PHOSPHATE 4 MG/ML
10 INJECTION, SOLUTION INTRA-ARTICULAR; INTRALESIONAL; INTRAMUSCULAR; INTRAVENOUS; SOFT TISSUE ONCE
Status: COMPLETED | OUTPATIENT
Start: 2018-06-12 | End: 2018-06-12

## 2018-06-06 RX ORDER — DIPHENHYDRAMINE HYDROCHLORIDE 50 MG/ML
50 INJECTION, SOLUTION INTRAMUSCULAR; INTRAVENOUS ONCE
Status: COMPLETED | OUTPATIENT
Start: 2018-06-12 | End: 2018-06-12

## 2018-06-12 ENCOUNTER — HOSPITAL ENCOUNTER (OUTPATIENT)
Dept: INFUSION THERAPY | Age: 58
Discharge: HOME | End: 2018-06-12
Payer: MEDICARE

## 2018-06-12 VITALS
BODY MASS INDEX: 44.41 KG/M2 | WEIGHT: 293 LBS | HEART RATE: 77 BPM | SYSTOLIC BLOOD PRESSURE: 135 MMHG | DIASTOLIC BLOOD PRESSURE: 74 MMHG | HEIGHT: 68 IN | RESPIRATION RATE: 18 BRPM | TEMPERATURE: 97 F

## 2018-06-12 LAB
BASOPHILS # BLD: 0 K/UL (ref 0–0.1)
BASOPHILS NFR BLD: 1 % (ref 0–1)
DIFFERENTIAL METHOD BLD: ABNORMAL
EOSINOPHIL # BLD: 0.2 K/UL (ref 0–0.4)
EOSINOPHIL NFR BLD: 2 % (ref 0–7)
ERYTHROCYTE [DISTWIDTH] IN BLOOD BY AUTOMATED COUNT: 14.3 % (ref 11.5–14.5)
HCT VFR BLD AUTO: 26.6 % (ref 35–47)
HGB BLD-MCNC: 8.2 G/DL (ref 11.5–16)
IMM GRANULOCYTES # BLD: 0.1 K/UL (ref 0–0.04)
IMM GRANULOCYTES NFR BLD AUTO: 1 % (ref 0–0.5)
LYMPHOCYTES # BLD: 2.2 K/UL (ref 0.8–3.5)
LYMPHOCYTES NFR BLD: 29 % (ref 12–49)
MCH RBC QN AUTO: 29.6 PG (ref 26–34)
MCHC RBC AUTO-ENTMCNC: 30.8 G/DL (ref 30–36.5)
MCV RBC AUTO: 96 FL (ref 80–99)
MONOCYTES # BLD: 0.5 K/UL (ref 0–1)
MONOCYTES NFR BLD: 6 % (ref 5–13)
NEUTS SEG # BLD: 4.5 K/UL (ref 1.8–8)
NEUTS SEG NFR BLD: 60 % (ref 32–75)
NRBC # BLD: 0 K/UL (ref 0–0.01)
NRBC BLD-RTO: 0 PER 100 WBC
PLATELET # BLD AUTO: 188 K/UL (ref 150–400)
PMV BLD AUTO: 9.7 FL (ref 8.9–12.9)
RBC # BLD AUTO: 2.77 M/UL (ref 3.8–5.2)
WBC # BLD AUTO: 7.5 K/UL (ref 3.6–11)

## 2018-06-12 PROCEDURE — 96413 CHEMO IV INFUSION 1 HR: CPT

## 2018-06-12 PROCEDURE — 74011250636 HC RX REV CODE- 250/636: Performed by: OBSTETRICS & GYNECOLOGY

## 2018-06-12 PROCEDURE — 96375 TX/PRO/DX INJ NEW DRUG ADDON: CPT

## 2018-06-12 PROCEDURE — 74011000258 HC RX REV CODE- 258: Performed by: OBSTETRICS & GYNECOLOGY

## 2018-06-12 PROCEDURE — 85025 COMPLETE CBC W/AUTO DIFF WBC: CPT | Performed by: OBSTETRICS & GYNECOLOGY

## 2018-06-12 PROCEDURE — 74011000250 HC RX REV CODE- 250: Performed by: OBSTETRICS & GYNECOLOGY

## 2018-06-12 PROCEDURE — 77030012965 HC NDL HUBR BBMI -A

## 2018-06-12 PROCEDURE — 36415 COLL VENOUS BLD VENIPUNCTURE: CPT | Performed by: OBSTETRICS & GYNECOLOGY

## 2018-06-12 RX ORDER — DEXAMETHASONE SODIUM PHOSPHATE 4 MG/ML
10 INJECTION, SOLUTION INTRA-ARTICULAR; INTRALESIONAL; INTRAMUSCULAR; INTRAVENOUS; SOFT TISSUE ONCE
Status: COMPLETED | OUTPATIENT
Start: 2018-06-19 | End: 2018-06-19

## 2018-06-12 RX ORDER — HEPARIN 100 UNIT/ML
500 SYRINGE INTRAVENOUS AS NEEDED
Status: ACTIVE | OUTPATIENT
Start: 2018-06-12 | End: 2018-06-13

## 2018-06-12 RX ORDER — SODIUM CHLORIDE 0.9 % (FLUSH) 0.9 %
10-40 SYRINGE (ML) INJECTION AS NEEDED
Status: ACTIVE | OUTPATIENT
Start: 2018-06-12 | End: 2018-06-13

## 2018-06-12 RX ORDER — SODIUM CHLORIDE 9 MG/ML
10 INJECTION INTRAMUSCULAR; INTRAVENOUS; SUBCUTANEOUS AS NEEDED
Status: ACTIVE | OUTPATIENT
Start: 2018-06-12 | End: 2018-06-13

## 2018-06-12 RX ORDER — DIPHENHYDRAMINE HYDROCHLORIDE 50 MG/ML
50 INJECTION, SOLUTION INTRAMUSCULAR; INTRAVENOUS ONCE
Status: COMPLETED | OUTPATIENT
Start: 2018-06-19 | End: 2018-06-19

## 2018-06-12 RX ADMIN — Medication 10 ML: at 10:15

## 2018-06-12 RX ADMIN — SODIUM CHLORIDE, PRESERVATIVE FREE 500 UNITS: 5 INJECTION INTRAVENOUS at 15:00

## 2018-06-12 RX ADMIN — DEXAMETHASONE SODIUM PHOSPHATE 10 MG: 4 INJECTION, SOLUTION INTRAMUSCULAR; INTRAVENOUS at 12:25

## 2018-06-12 RX ADMIN — FAMOTIDINE 20 MG: 10 INJECTION, SOLUTION INTRAVENOUS at 12:16

## 2018-06-12 RX ADMIN — PACLITAXEL 120 MG: 6 INJECTION, SOLUTION INTRAVENOUS at 13:45

## 2018-06-12 RX ADMIN — DIPHENHYDRAMINE HYDROCHLORIDE 50 MG: 50 INJECTION INTRAMUSCULAR; INTRAVENOUS at 12:27

## 2018-06-12 RX ADMIN — SODIUM CHLORIDE 10 ML: 9 INJECTION INTRAMUSCULAR; INTRAVENOUS; SUBCUTANEOUS at 10:15

## 2018-06-12 RX ADMIN — Medication 10 ML: at 15:00

## 2018-06-12 NOTE — PROGRESS NOTES
1015 Pt admit to [facility] for C2D8 Paclitaxel ambulatory in stable condition. Assessment completed. No new concerns voiced. Port accessed and flushed with positive blood return. Labs drawn per order and sent for processing. Normal Saline started at [facility]. Labs reviewed medication ordered. Visit Vitals    /74    Pulse 77    Temp 97 °F (36.1 °C)    Resp 18    Ht 5' 8\" (1.727 m)    Wt 138.3 kg (304 lb 12.8 oz)    LMP 09/07/2011    BMI 46.34 kg/m2       Medications:  Normal Saline KVO  Benadryl IV Push  Pepcid IV Push  Decadron IV Push  Paclitaxel  Heparin Flush    1500 Pt tolerated treatment well. Port maintained positive blood return throughout treatment, flushed with positive blood return at conclusion and port heparinized and de-accessed per protocol. D/c home ambulatory in no distress. Pt aware of next [facility] appointment scheduled for 6/19/18. Recent Results (from the past 12 hour(s))   CBC WITH AUTOMATED DIFF    Collection Time: 06/12/18 10:26 AM   Result Value Ref Range    WBC 7.5 3.6 - 11.0 K/uL    RBC 2.77 (L) 3.80 - 5.20 M/uL    HGB 8.2 (L) 11.5 - 16.0 g/dL    HCT 26.6 (L) 35.0 - 47.0 %    MCV 96.0 80.0 - 99.0 FL    MCH 29.6 26.0 - 34.0 PG    MCHC 30.8 30.0 - 36.5 g/dL    RDW 14.3 11.5 - 14.5 %    PLATELET 414 981 - 354 K/uL    MPV 9.7 8.9 - 12.9 FL    NRBC 0.0 0  WBC    ABSOLUTE NRBC 0.00 0.00 - 0.01 K/uL    NEUTROPHILS 60 32 - 75 %    LYMPHOCYTES 29 12 - 49 %    MONOCYTES 6 5 - 13 %    EOSINOPHILS 2 0 - 7 %    BASOPHILS 1 0 - 1 %    IMMATURE GRANULOCYTES 1 (H) 0.0 - 0.5 %    ABS. NEUTROPHILS 4.5 1.8 - 8.0 K/UL    ABS. LYMPHOCYTES 2.2 0.8 - 3.5 K/UL    ABS. MONOCYTES 0.5 0.0 - 1.0 K/UL    ABS. EOSINOPHILS 0.2 0.0 - 0.4 K/UL    ABS. BASOPHILS 0.0 0.0 - 0.1 K/UL    ABS. IMM.  GRANS. 0.1 (H) 0.00 - 0.04 K/UL    DF AUTOMATED

## 2018-06-15 ENCOUNTER — TELEPHONE (OUTPATIENT)
Dept: GYNECOLOGY | Age: 58
End: 2018-06-15

## 2018-06-15 NOTE — TELEPHONE ENCOUNTER
I called Lupe with [facility] at [phone], and she reviewed with me that pt needs a root canal, and crowns.   At 9:30am.

## 2018-06-15 NOTE — TELEPHONE ENCOUNTER
After speaking with Dr. Alejandro Monet, I placed another call to [phone] was not available and  took message to give to Arminda, that per Dr. Alejandro Monet pt is to complete C2D3 chemotherapy on 6/19/18 and wait 3 weeks after that tx and may have sx at that point.

## 2018-06-15 NOTE — TELEPHONE ENCOUNTER
I called pt to inform her of Dr. Verito Tobin orders for her root canal and crown sx with [facility]. She is to have C2W3 chemo on 6/19/18 and wait 3 weeks after that tx to have her sx. Advised her they should get a CBC before the sx. She will keep me informed of dates, so I can adjust her schedule accordingly.

## 2018-06-18 ENCOUNTER — TELEPHONE (OUTPATIENT)
Dept: GYNECOLOGY | Age: 58
End: 2018-06-18

## 2018-06-18 RX ORDER — LINACLOTIDE 145 UG/1
CAPSULE, GELATIN COATED ORAL
Qty: 30 CAP | Refills: 0 | Status: SHIPPED | OUTPATIENT
Start: 2018-06-18 | End: 2018-08-10 | Stop reason: SDUPTHER

## 2018-06-19 ENCOUNTER — HOSPITAL ENCOUNTER (OUTPATIENT)
Dept: INFUSION THERAPY | Age: 58
Discharge: HOME | End: 2018-06-19
Payer: MEDICARE

## 2018-06-19 VITALS
DIASTOLIC BLOOD PRESSURE: 73 MMHG | WEIGHT: 293 LBS | BODY MASS INDEX: 43.4 KG/M2 | HEIGHT: 69 IN | RESPIRATION RATE: 16 BRPM | SYSTOLIC BLOOD PRESSURE: 131 MMHG | TEMPERATURE: 97.9 F | HEART RATE: 66 BPM

## 2018-06-19 LAB
BASOPHILS # BLD: 0.1 K/UL (ref 0–0.1)
BASOPHILS NFR BLD: 1 % (ref 0–1)
DIFFERENTIAL METHOD BLD: ABNORMAL
EOSINOPHIL # BLD: 0.2 K/UL (ref 0–0.4)
EOSINOPHIL NFR BLD: 3 % (ref 0–7)
ERYTHROCYTE [DISTWIDTH] IN BLOOD BY AUTOMATED COUNT: 14.4 % (ref 11.5–14.5)
HCT VFR BLD AUTO: 27 % (ref 35–47)
HGB BLD-MCNC: 8.3 G/DL (ref 11.5–16)
IMM GRANULOCYTES # BLD: 0.1 K/UL (ref 0–0.04)
IMM GRANULOCYTES NFR BLD AUTO: 1 % (ref 0–0.5)
LYMPHOCYTES # BLD: 2.3 K/UL (ref 0.8–3.5)
LYMPHOCYTES NFR BLD: 31 % (ref 12–49)
MCH RBC QN AUTO: 29.6 PG (ref 26–34)
MCHC RBC AUTO-ENTMCNC: 30.7 G/DL (ref 30–36.5)
MCV RBC AUTO: 96.4 FL (ref 80–99)
MONOCYTES # BLD: 0.4 K/UL (ref 0–1)
MONOCYTES NFR BLD: 6 % (ref 5–13)
NEUTS SEG # BLD: 4.2 K/UL (ref 1.8–8)
NEUTS SEG NFR BLD: 58 % (ref 32–75)
NRBC # BLD: 0 K/UL (ref 0–0.01)
NRBC BLD-RTO: 0 PER 100 WBC
PLATELET # BLD AUTO: 189 K/UL (ref 150–400)
PMV BLD AUTO: 9.5 FL (ref 8.9–12.9)
RBC # BLD AUTO: 2.8 M/UL (ref 3.8–5.2)
WBC # BLD AUTO: 7.2 K/UL (ref 3.6–11)

## 2018-06-19 PROCEDURE — 74011250636 HC RX REV CODE- 250/636: Performed by: OBSTETRICS & GYNECOLOGY

## 2018-06-19 PROCEDURE — 74011000250 HC RX REV CODE- 250: Performed by: OBSTETRICS & GYNECOLOGY

## 2018-06-19 PROCEDURE — 96375 TX/PRO/DX INJ NEW DRUG ADDON: CPT

## 2018-06-19 PROCEDURE — 74011250636 HC RX REV CODE- 250/636: Performed by: PHYSICIAN ASSISTANT

## 2018-06-19 PROCEDURE — 96413 CHEMO IV INFUSION 1 HR: CPT

## 2018-06-19 PROCEDURE — 74011000258 HC RX REV CODE- 258: Performed by: OBSTETRICS & GYNECOLOGY

## 2018-06-19 PROCEDURE — 85025 COMPLETE CBC W/AUTO DIFF WBC: CPT

## 2018-06-19 PROCEDURE — 74011000250 HC RX REV CODE- 250: Performed by: PHYSICIAN ASSISTANT

## 2018-06-19 PROCEDURE — 36415 COLL VENOUS BLD VENIPUNCTURE: CPT

## 2018-06-19 PROCEDURE — 77030012965 HC NDL HUBR BBMI -A

## 2018-06-19 RX ORDER — SODIUM CHLORIDE 9 MG/ML
10 INJECTION INTRAMUSCULAR; INTRAVENOUS; SUBCUTANEOUS AS NEEDED
Status: ACTIVE | OUTPATIENT
Start: 2018-06-19 | End: 2018-06-20

## 2018-06-19 RX ORDER — HEPARIN 100 UNIT/ML
500 SYRINGE INTRAVENOUS AS NEEDED
Status: ACTIVE | OUTPATIENT
Start: 2018-06-19 | End: 2018-06-20

## 2018-06-19 RX ORDER — SODIUM CHLORIDE 9 MG/ML
25 INJECTION, SOLUTION INTRAVENOUS AS NEEDED
Status: DISPENSED | OUTPATIENT
Start: 2018-06-19 | End: 2018-06-20

## 2018-06-19 RX ORDER — SODIUM CHLORIDE 0.9 % (FLUSH) 0.9 %
10-40 SYRINGE (ML) INJECTION AS NEEDED
Status: ACTIVE | OUTPATIENT
Start: 2018-06-19 | End: 2018-06-20

## 2018-06-19 RX ADMIN — DIPHENHYDRAMINE HYDROCHLORIDE 50 MG: 50 INJECTION INTRAMUSCULAR; INTRAVENOUS at 11:20

## 2018-06-19 RX ADMIN — Medication 10 ML: at 13:32

## 2018-06-19 RX ADMIN — SODIUM CHLORIDE 10 ML: 9 INJECTION INTRAMUSCULAR; INTRAVENOUS; SUBCUTANEOUS at 10:30

## 2018-06-19 RX ADMIN — SODIUM CHLORIDE, PRESERVATIVE FREE 500 UNITS: 5 INJECTION INTRAVENOUS at 13:33

## 2018-06-19 RX ADMIN — SODIUM CHLORIDE 25 ML/HR: 900 INJECTION, SOLUTION INTRAVENOUS at 11:18

## 2018-06-19 RX ADMIN — PACLITAXEL 120 MG: 6 INJECTION, SOLUTION INTRAVENOUS at 12:19

## 2018-06-19 RX ADMIN — FAMOTIDINE 20 MG: 10 INJECTION, SOLUTION INTRAVENOUS at 11:23

## 2018-06-19 RX ADMIN — DEXAMETHASONE SODIUM PHOSPHATE 10 MG: 4 INJECTION, SOLUTION INTRAMUSCULAR; INTRAVENOUS at 11:25

## 2018-06-19 NOTE — PROGRESS NOTES
Outpatient Infusion Center Nursing Progress Note    2061  Patient arrived to [facility] accompanied by family for scheduled Taxol  Cycle 2 day 15. PT complaint of itchy patch on upper right side of back. L port accessed with  1.0 in. florez needle, labs drawn, port flushed, positive blood return noted. labs   Recent Results (from the past 12 hour(s))   CBC WITH AUTOMATED DIFF    Collection Time: 06/19/18 10:32 AM   Result Value Ref Range    WBC 7.2 3.6 - 11.0 K/uL    RBC 2.80 (L) 3.80 - 5.20 M/uL    HGB 8.3 (L) 11.5 - 16.0 g/dL    HCT 27.0 (L) 35.0 - 47.0 %    MCV 96.4 80.0 - 99.0 FL    MCH 29.6 26.0 - 34.0 PG    MCHC 30.7 30.0 - 36.5 g/dL    RDW 14.4 11.5 - 14.5 %    PLATELET 555 774 - 490 K/uL    MPV 9.5 8.9 - 12.9 FL    NRBC 0.0 0  WBC    ABSOLUTE NRBC 0.00 0.00 - 0.01 K/uL    NEUTROPHILS 58 32 - 75 %    LYMPHOCYTES 31 12 - 49 %    MONOCYTES 6 5 - 13 %    EOSINOPHILS 3 0 - 7 %    BASOPHILS 1 0 - 1 %    IMMATURE GRANULOCYTES 1 (H) 0.0 - 0.5 %    ABS. NEUTROPHILS 4.2 1.8 - 8.0 K/UL    ABS. LYMPHOCYTES 2.3 0.8 - 3.5 K/UL    ABS. MONOCYTES 0.4 0.0 - 1.0 K/UL    ABS. EOSINOPHILS 0.2 0.0 - 0.4 K/UL    ABS. BASOPHILS 0.1 0.0 - 0.1 K/UL    ABS. IMM. GRANS. 0.1 (H) 0.00 - 0.04 K/UL    DF AUTOMATED       Patient received infusion without difficulty. Medications this visit:    NS KVO  Benadryl IVP  Pepcid IVP  Decadron IVP  Taxol IVPB    Vitals   Patient Vitals for the past 12 hrs:   Temp Pulse Resp BP   06/19/18 1331 97.9 °F (36.6 °C) 66 16 131/73   06/19/18 1016 97 °F (36.1 °C) 80 18 147/82     Next appointment,  7/3/18 @ 1000    1335 Port maintained +blood return throughout treatment and prior to D/C, port flushed and removed per policy . PT D/C from [facility] ambulatory in no distress, accompanied by family.

## 2018-06-19 NOTE — PROGRESS NOTES
Problem: Chemotherapy Treatment  Goal: *Chemotherapy regimen followed  Outcome: Progressing Towards Goal  Pt arrived at [facility] for  Chemotherapy Flowsheet 6/19/2018   Cycle C2D15   Date 6/19/2018   Drug / Regimen Taxol   Dosage -   Pre Hydration -   Pre Meds given   Notes given

## 2018-06-20 NOTE — TELEPHONE ENCOUNTER
I returned pt call and she would like to continue with her chemotherapy that is scheduled and not have dental sx now.   Confirmed with her next appt with Severo Bounds PA for 7/3/18 at 10am.

## 2018-06-27 DIAGNOSIS — I10 ESSENTIAL HYPERTENSION: ICD-10-CM

## 2018-06-28 ENCOUNTER — OFFICE VISIT (OUTPATIENT)
Dept: GYNECOLOGY | Age: 58
End: 2018-06-28

## 2018-06-28 VITALS
HEIGHT: 69 IN | SYSTOLIC BLOOD PRESSURE: 119 MMHG | HEART RATE: 68 BPM | BODY MASS INDEX: 43.4 KG/M2 | DIASTOLIC BLOOD PRESSURE: 74 MMHG | WEIGHT: 293 LBS

## 2018-06-28 DIAGNOSIS — N18.30 STAGE 3 CHRONIC KIDNEY DISEASE (HCC): ICD-10-CM

## 2018-06-28 DIAGNOSIS — R76.8 HCV ANTIBODY POSITIVE: ICD-10-CM

## 2018-06-28 DIAGNOSIS — E11.21 TYPE 2 DIABETES MELLITUS WITH NEPHROPATHY (HCC): ICD-10-CM

## 2018-06-28 DIAGNOSIS — C56.9 MALIGNANT NEOPLASM OF OVARY, UNSPECIFIED LATERALITY (HCC): Primary | ICD-10-CM

## 2018-06-28 DIAGNOSIS — R22.2 CHEST WALL MASS: ICD-10-CM

## 2018-06-28 DIAGNOSIS — Z79.899 ON ANTINEOPLASTIC CHEMOTHERAPY: ICD-10-CM

## 2018-06-28 DIAGNOSIS — R59.0 LYMPHADENOPATHY, MEDIASTINAL: ICD-10-CM

## 2018-06-28 RX ORDER — VALSARTAN 80 MG/1
TABLET ORAL
Qty: 90 TAB | Refills: 2 | Status: SHIPPED | OUTPATIENT
Start: 2018-06-28 | End: 2018-08-03

## 2018-06-28 RX ORDER — DEXAMETHASONE SODIUM PHOSPHATE 4 MG/ML
10 INJECTION, SOLUTION INTRA-ARTICULAR; INTRALESIONAL; INTRAMUSCULAR; INTRAVENOUS; SOFT TISSUE ONCE
Status: COMPLETED | OUTPATIENT
Start: 2018-07-03 | End: 2018-07-03

## 2018-06-28 RX ORDER — DIPHENHYDRAMINE HYDROCHLORIDE 50 MG/ML
50 INJECTION, SOLUTION INTRAMUSCULAR; INTRAVENOUS ONCE
Status: COMPLETED | OUTPATIENT
Start: 2018-07-03 | End: 2018-07-03

## 2018-06-28 NOTE — MR AVS SNAPSHOT
[address] [address] 
[phone] Patient: Trudi Wallis MRN: [id number] :1960 Visit Information Date & Time Provider Department Dept. Phone Encounter #  
 2018 11:00 AM Indira Fragoso, [address] [place] [address] Gynecologic Oncology [phone] [id number] Follow-up Instructions Return in about 4 weeks (around 2018). Your Appointments 2018  2:30 PM  
CHEMOTHERAPY with Michael Kwon MD  
[address] [address] [facility] [address] [facility]) Appt Note: 2:30pm post 3 cycles weekly [address] [address]  
[address] Upcoming Health Maintenance Date Due DTaP/Tdap/Td series (1 - Tdap) 1982 COLONOSCOPY 10/14/2016 PAP AKA CERVICAL CYTOLOGY 2017 BREAST CANCER SCRN MAMMOGRAM 2018* Influenza Age 5 to Adult 2018 MEDICARE YEARLY EXAM 8/3/2018 HEMOGLOBIN A1C Q6M 2018 Pneumococcal 19-64 Highest Risk (2 of 3 - PCV13) 10/24/2018 FOOT EXAM Q1 2018 EYE EXAM RETINAL OR DILATED Q1 3/23/2019 MICROALBUMIN Q1 3/28/2019 LIPID PANEL Q1 3/28/2019 *Topic was postponed. The date shown is not the original due date. Allergies as of 2018  Review Complete On: 2018 By: Ladell Angelucci, LPN Severity Noted Reaction Type Reactions Iodinated Contrast- Oral And Iv Dye High 2017   Topical Rash 13 hr pre-medication prior to IV contrast  
 Carboplatin  2014    Other (comments) Patient developed shortness of breath, felt faint, coughing, skin flushed and \"itchy\". This occurred on the patients 8th treatment. Lipitor [Atorvastatin]  2014    Myalgia Percocet [Oxycodone-acetaminophen]  2012   Not Verified Other (comments) fever Shellfish Containing Products  10/04/2012    Hives Tape [Adhesive]  03/16/2017    Itching, Other (comments) \"op site-- clear,thin tape\"--caused blister Tomato  12/05/2017    Hives Current Immunizations  Reviewed on 6/19/2018 Name Date Influenza Vaccine (Quad) PF 12/13/2016 Not reviewed this visit You Were Diagnosed With   
  
 Codes Comments HCV antibody positive    -  Primary ICD-10-CM: R76.8 ICD-9-CM: 795.79 Vitals BP Pulse Height(growth percentile) Weight(growth percentile) LMP BMI  
 119/74 (BP 1 Location: Left arm, BP Patient Position: Sitting) 68 5' 9\" (1.753 m) 306 lb (138.8 kg) 09/07/2011 45.19 kg/m2 OB Status Smoking Status Hysterectomy Never Smoker BMI and BSA Data Body Mass Index Body Surface Area  
 45.19 kg/m 2 2.6 m 2 Preferred Pharmacy Pharmacy Name Phone [id number] [place], [facility] [phone] Your Updated Medication List  
  
   
This list is accurate as of 6/28/18 11:59 AM.  Always use your most recent med list.  
  
  
  
  
 * albuterol 90 mcg/actuation inhaler Commonly known as:  PROVENTIL HFA, VENTOLIN HFA, PROAIR HFA Take 2 Puffs by inhalation every four (4) hours as needed for Wheezing. * albuterol 2.5 mg /3 mL (0.083 %) nebulizer solution Commonly known as:  PROVENTIL VENTOLIN  
USE 1 VAIL VIA NEBULIZER EVERY 4 HOURS AS NEEDED FOR WHEEZING  
  
 ALLEGRA 180 mg tablet Generic drug:  fexofenadine Take 180 mg by mouth daily. BD ULTRA-FINE SHORT PEN NEEDLE 31 gauge x 5/16\" Ndle Generic drug:  Insulin Needles (Disposable) USE WITH INSULIN TO INJECT FIVE TIMES DAILY. cholecalciferol (VITAMIN D3) 5,000 unit Tab tablet Commonly known as:  VITAMIN D3 Take 5,000 Units by mouth daily. clonazePAM 0.5 mg tablet Commonly known as:  [place] Furnish Take 0.5 mg by mouth nightly as needed. Dexlansoprazole 60 mg Cpdb Commonly known as:  DEXILANT Take 1 Cap by mouth daily. diphenhydrAMINE 25 mg capsule Commonly known as:  BENADRYL Take 25 mg by mouth as needed. fluticasone 50 mcg/actuation nasal spray Commonly known as:  Francella Lacks 2 Sprays by Both Nostrils route daily. furosemide 40 mg tablet Commonly known as:  LASIX Take 80 mg by mouth as needed. HYDROcodone-acetaminophen 5-325 mg per tablet Commonly known as:  Lupe Dieter Take 1 Tab by mouth every four (4) hours as needed for Pain. Max Daily Amount: 6 Tabs. insulin aspart U-100 100 unit/mL Inpn Commonly known as:  NovoLOG Flexpen U-100 Insulin INJECT 20 UNITS BEFORE EACH MEAL + 4 UNITS FOR EVERY 50 MG/DL ABOVE 150 MG/DL.  UNITS PER DAY  
  
 insulin detemir U-100 100 unit/mL (3 mL) Inpn Commonly known as:  LEVEMIR FLEXTOUCH U-100 INSULN INJECT 30 UNITS IN AM AND 50 UNITS AT NIGHT. INCREASE AS DIRECTED TO  UNITS PER DAY--CALL [phone] FOR APPOINTMENT FOR MORE REFILLS  
  
 levothyroxine 150 mcg tablet Commonly known as:  SYNTHROID  
TAKE 1 TAB BY MOUTH DAILY (BEFORE BREAKFAST). --[facility] [phone] FOR APPOINTMENT FOR MORE REFILLS  
  
 lidocaine-prilocaine topical cream  
Commonly known as:  EMLA Apply small amount over port area one hour before chemo treatment and cover with a Band-Aid LINZESS 145 mcg Cap capsule Generic drug:  linaclotide TAKE 1 CAPSULE BY MOUTH EVERY DAY Liraglutide 0.6 mg/0.1 mL (18 mg/3 mL) Pnij Commonly known as:  VICTOZA  
1.8 mg by SubCUTAneous route daily (with lunch). LUBRICANT EYE 57.3-42.5 % Oint ophthalmic ointment Generic drug:  White Petrolatum-Mineral Oil APPLY TOPICALLY INTO BOTH EYES EVERY DAY FOR 3 MONTHS  
  
 montelukast 10 mg tablet Commonly known as:  SINGULAIR  
TAKE 1 TAB BY MOUTH DAILY. naloxone 2 mg/0.4 mL auto-injector Commonly known as:  EVZIO  
0.4 mL by IntraMUSCular route once as needed for Overdose for up to 1 dose. nystatin powder Commonly known as:  MYCOSTATIN Apply  to affected area four (4) times daily as needed. omega-3 acid ethyl esters 1 gram capsule Commonly known as:  Gweneth Salt TAKE ONE CAPSULE BY MOUTH TWICE A DAY  
  
 ondansetron 8 mg disintegrating tablet Commonly known as:  ZOFRAN ODT Take 1 Tab by mouth every eight (8) hours as needed for Nausea. potassium chloride 10 mEq tablet Commonly known as:  KLOR-CON M10  
TAKE 1 TAB BY MOUTH TWO (2) DAYS A WEEK. WED AND FRI  
  
 RESTASIS 0.05 % ophthalmic emulsion Generic drug:  cycloSPORINE  
1 Drop two (2) times a day. sertraline 50 mg tablet Commonly known as:  ZOLOFT  
TAKE 1 TABLET BY MOUTH EVERY MORNING  
  
 SYMBICORT 160-4.5 mcg/actuation Hfaa Generic drug:  budesonide-formoterol Take 2 Puffs by inhalation two (2) times daily as needed. valsartan 80 mg tablet Commonly known as:  DIOVAN  
TAKE 1 TAB BY MOUTH DAILY. * Notice: This list has 2 medication(s) that are the same as other medications prescribed for you. Read the directions carefully, and ask your doctor or other care provider to review them with you. We Performed the Following HCV AB W/RFLX TO VANE [62682 CPT(R)] Follow-up Instructions Return in about 4 weeks (around 7/26/2018). To-Do List   
 07/03/2018 10:00 AM  
  Appointment with [address] [place] [address] at [address] [place] [address] Road ([phone])  
  
 07/10/2018 10:00 AM  
  Appointment with [address] [place] [address] at [address] [place] [address] Road ([phone])  
  
 07/17/2018 10:00 AM  
  Appointment with [address] [place] [address] at [address] [place] [address] Road ([phone]) Patient Instructions Dear Rose Mary Farmer, It was a pleasure seeing you today. We discussed your exam, labs and current therapy. Your exam is improved and your look fantastic. Here is our treatment plan: 
· Continue chemo on Tuesday and labs. · Next month you will follow up with Dr. Jermaine Teran for your prechemo. · Check Hep C ab. I will get back to you with these results as they come in. It may take some time to run the complete test. 
· Continue to exercise and and be well! If you have any questions, please call or email and we will get back to you as soon as possible. Please understand it may take some time to get back for non-urgent issues. Your patience is appreciated. [address] [place], [place] 
[phone] [address] [place] [address], [address], [address] [place] [address] SERVICES! Dear Liz Mancuso: Thank you for requesting a [employer] account. Our records indicate that you already have an active [employer] account. You can access your account anytime at [email or web address] Did you know that you can access your hospital and ER discharge instructions at any time in [employer]? You can also review all of your test results from your hospital stay or ER visit. Additional Information If you have questions, please visit the Frequently Asked Questions section of the [employer] website at [email or web address] Remember, [employer] is NOT to be used for urgent needs. For medical emergencies, dial 911. Now available from your iPhone and Android! Please provide this summary of care documentation to your next provider. Your primary care clinician is listed as [place]. If you have any questions after today's visit, please call [phone].

## 2018-06-28 NOTE — PATIENT INSTRUCTIONS
Dear Ana Luisa Mclean,     It was a pleasure seeing you today. We discussed your exam, labs and current therapy. Your exam is improved and your look fantastic. Here is our treatment plan:  · Continue chemo on Tuesday and labs. · Next month you will follow up with Dr. Astrid Holman for your prechemo. · Check Hep C ab. I will get back to you with these results as they come in. It may take some time to run the complete test.  · Continue to exercise and and be well! If you have any questions, please call or email and we will get back to you as soon as possible. Please understand it may take some time to get back for non-urgent issues. Your patience is appreciated.           [place], [place]  [email or web address] [place] [address], [address] [place] [address] Ave

## 2018-06-28 NOTE — PROGRESS NOTES
[address], [address], [address]  P [phone]  F [phone]      Office Visit    Patient ID:  Name: Shruthi Meza  MRN: [id number]  : 1960/57 y.o. Visit date: 2018    Primary Diagnosis:     ICD-10-CM ICD-9-CM    1. Malignant neoplasm of ovary, unspecified laterality (HCC) C56.9 183.0    2. HCV antibody positive R76.8 795.79 HCV AB W/RFLX TO VANE   3. Chest wall mass R22.2 786.6    4. On antineoplastic chemotherapy Z79. V58.69    5. Type 2 diabetes mellitus with nephropathy (HCC) E11.21 250.40      583.81    6. Lymphadenopathy, mediastinal R59.0 785.6    7. Stage 3 chronic kidney disease N18.3 585. 3         Problem List:    Patient Active Problem List   Diagnosis Code    Carcinomatosis ([address] C80.0    Controlled type 2 diabetes mellitus without complication, with long-term current use of insulin ([address] E11.9, Z79.4    Morbid obesity ([address] E66.01    Abnormal mammogram R92.8    Sleep apnea G47.30    Dyslipidemia (high LDL; low HDL) E78.5    Benign essential hypertension I10    Disorder of thyroid E07.9    CINV (chemotherapy-induced nausea and vomiting) R11.2, T45.1X5A    Portacath in place--left Z95.828    Chest pain, atypical R07.89    Hx of pulmonary embolus during pregnancy Z86.711, Z87.59    Ovarian cancer (HCC) C56.9    Hyperglycemia due to type 2 diabetes mellitus ([address] E11.65    Angina pectoris associated with type 2 diabetes mellitus ([address] E11.59, I20.9    Renal calculi N20.0    Elevated BUN R79.9    Pulmonary hypertension, mild (HCC) I27.20    Lymphadenopathy, mediastinal R59.0    Type 2 diabetes mellitus with nephropathy (HCC) E11.21    Chest mass R22.2    Chest wall mass R22.2    On antineoplastic chemotherapy Z79.899    Reactive depression F32.9    Stage 3 chronic kidney disease N18.3       INTERVAL HISTORY: Shruthi Meza is a anda American female with a history of 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 The patient's previous treatment procedures include primary Taxol/carbo with retreatment in 2014 and Doxil/Avastin, topotecan, gemzar and now single agent weekly Taxol initiated 5/8/18 following progression with chest wall involvement. Imaging EOD CT:  9/22/17  FINDINGS:   LUNG BASES: Dependent atelectasis. Otherwise clear. INCIDENTALLY IMAGED HEART AND MEDIASTINUM: Unremarkable. LIVER: No mass or biliary dilatation. GALLBLADDER: Unremarkable. SPLEEN: No mass. PANCREAS: No mass or ductal dilatation. ADRENALS: Unremarkable. KIDNEYS: Nonobstructing stones measuring 2 to 3 mm in both kidneys with at least  7 right and 3 left stones. Left renal hilar fullness seen on prior unenhanced CT  is no longer evident and I suspect this was due to mild hydronephrosis and renal  pelvis distention. STOMACH: Unremarkable. SMALL BOWEL: No dilatation or wall thickening. COLON: No dilatation or wall thickening. APPENDIX: Unremarkable. PERITONEUM: Multiple peritoneal implants are redemonstrated with a 16 x 22 mm  lesion anterior to the level of the liver, multiple left omental lesions more  inferiorly measuring up to 2 x 2.4 cm, and multiple implants along the inferior  margin of the right lower liver measuring up to 3.4 x 3.6 cm. Additionally there  is a implant in the right paracolic gutter measuring 2.1 x 2.2 cm. Intrapelvic  masses measure up to 3.5 x 4.0 cm and the rectovaginal space. RETROPERITONEUM: Extensive bulky adenopathy with rocio hepatis adenopathy  measuring up to 5.0 x 6.5 cm and a mass along the tail of the pancreas measuring  proximally 4.0 x 5.2 cm. Aortocaval adenopathy measures up to 3.6 x 5.0 cm. Numerous pelvic adenopathy measures up to 3.3 x 5.2 cm in the left external  iliac chain and 2.7 x 2.0 cm in the right internal iliac chain. REPRODUCTIVE ORGANS: Uterus and ovaries are surgically absent.   URINARY BLADDER: No mass or calculus. BONES: Stable degenerative spine change with endplate sclerosis and  benign-appearing lucencies within the L2 and L4 vertebral bodies, likely  reflecting hemangioma. ADDITIONAL COMMENTS: There is skin thickening in the anterior lower abdominal  wall bilaterally and expected appearance to abdominal postsurgical incisional  change in the midline.     IMPRESSION  IMPRESSION:  1. Left renal hilar fullness is likely due to left renal pelvis distention on  prior exam which has since resolved. 2. Extensive intra-abdominal and nkechi metastatic disease appears grossly stable  as described above. 3. Intra-abdominal wall skin thickening may reflect cellulitis. Correlate with  clinical exam.      CT chest 4/2018:  FINDINGS:  LUNGS: There is a rounded patchy area of airspace process in the periphery of  the superior segment of the right lower lobe as well as patchy areas along the  periphery just inferior to it. Similar finding noted in the periphery of the  lingula as well as more centrally in the lingula. These may be areas of acute  airspace process. Alissa Narendra PLEURA: There is a chest wall mass which lies along the right side of the  proximal body of the sternum which has pleural base and extends through the  chest wall as well as eroding the adjacent sternum. On axial imaging this mass  is 4.9 x 4.4 cm in size. This may account for the patient's right chest wall  symptoms. TRACHEA/BRONCHI: Patent      The absence of intravenous contrast reduces the sensitivity for evaluation of  the mediastinum and upper abdominal organs.     MEDIASTINUM/SWATI: There is massive adenopathy in the superior mediastinum as  well as adenopathy which extends into the hilar regions bilaterally and in the  subcarinal region. Adenopathy is also identified in the pericardial lymph nodes  on the right. Largest of these measures 2.1 cm.  There is also an enlarged lymph  node along the left posterior margin of the manubrium/body of the sternum which  measures 2.9 cm. .     AORTA:  The aorta has a normal contour with no evidence of aneurysm.     UPPER ABDOMEN: On previous abdominal CT adenopathy was noted in the upper  abdomen. There are retrocrural lymph nodes as well the largest of which on the  left measures 1.4 x 3.7 cm. Previously this lymph node measured 0.8 x 3.1 cm. There is marked adenopathy in the celiac axis extending into the hepatic hilus. This also extends into the area of the falciform ligament. This is more  extensive than on the previous study. There is also perigastric adenopathy on  the left which is now measures approximately 5.3 x 7.4 cm. Previously this was  5.2 x 4.0 cm. .     BONES: As mentioned previously there is lytic erosion along the right side of  the proximal body of the sternum adjacent to the mass. There is a lucency in the  T4 vertebral body.     IMPRESSION:    1. There is a chest wall mass in the right parasternal region which invades the  sternum and is seen on both the internal internal as well as external sides of  the chest wall. This may account for the patient's symptoms of right-sided chest  pain. 2. There are patchy areas of airspace process bilaterally which may represent  areas of acute process in the to be correlated clinically. 3. Massive adenopathy in the mediastinum noted. 4. Increasing adenopathy in the retrocrural region and upper abdomen. .  Active, no restrictions. Pathology reported: CT bx chest wall mass 4/2018    Chest wall mass in parasternal region, CT-guided needle core biopsy with touch preps:   Metastatic high-grade serous carcinoma (see comment)   General Categorization   Positive for malignancy.      Lab Results   Component Value Date/Time    CA-125 96 (H) 06/05/2018 10:28 AM    Cancer Ag (CA) 125 226.1 (H) 05/03/2018 11:15 AM       Cancer Ag (CA) 125   Date Value Ref Range Status   05/03/2018 226.1 (H) 0.0 - 38.1 U/mL Final     Comment:     Roche ECLIA methodology   03/28/2018 271.7 (H) 0.0 - 38.1 U/mL Final     Comment:     Roche ECLIA methodology   08/28/2017 183.8 (H) 0.0 - 38.1 U/mL Final     Comment:     Roche ECLIA methodology   07/28/2017 157.4 (H) 0.0 - 38.1 U/mL Final     Comment:     Roche ECLIA methodology   02/28/2017 259.0 (H) 0.0 - 38.1 U/mL Final     Comment:     Roche ECLIA methodology   01/30/2017 347.1 (H) 0.0 - 38.1 U/mL Final     Comment:     Roche ECLIA methodology       SUBJECTIVE:  Returns for prechemo doing well. She continues to work full time driving/transport. Remains very active working out at gym and supported by 3 sons and friends. She lost her  last May. She is in counseling, has antidepression therapy and well controlled. She gambles from time to time and is excited for these trips. Remains very pleased with control of her sleep apnea with CPAP and her DM with A1C recently to 5.2. She reports mild chest discomfort with the disease. Rare nausea, no emesis. Reports her chest wall mass has gotten smaller. Very excited to see  decline. ROS  Constitutional: no weight loss, fever, night sweats  Respiratory: no cough, shortness of breath, or wheezing  Cardiovascular: no chest pain or dyspnea on exertion  Heme: No abnormal bleeding  Gastrointestinal: no abdominal pain, change in bowel habits, or black or bloody stools  Genito-Urinary: no dysuria, trouble voiding, or hematuria  Musculoskeletal: negative for - swelling in ankle - bilateral  Neurological: negative for - mild neuropathy  Derm: pigmentation/induration medial left leg. Prior injury from fall. Uppre back painful lesion postchemo, reports initially looked like a bruise. Better now, no report of trauma. Used  steroid cream  Psych: positive for depression - controlled        PMH:  Past Medical History:   Diagnosis Date    Adverse effect of anesthesia     sleep apnea cpap machine useage     Anemia     Arthritis     DDD low back and knees    Asthma     uses albuterol prn only; none x 6 mos. Never hospitalized    BRCA negative 2013    Calculus of kidney     Chronic kidney disease     kidney stones    Chronic pain     knee pain bilat    CINV (chemotherapy-induced nausea and vomiting) 2014    Decreased hearing, right     PATIENT STATES THIS IS DUE TO CHEMOTHERAPY    Diabetes ([address] Age 46    IDDM    Environmental allergies     Fibromyalgia     GERD (gastroesophageal reflux disease)     controlled with med    Hypertension     Ill-defined condition     Sepsis  -  SOURCE PORT    Ill-defined condition 2016    chemotherapy     Mass of chest wall 2018    Morbid obesity ([address]     Murmur, heart     Other and unspecified hyperlipidemia     Ovarian cancer ([address] 2012, 2014    serous, high grade, stage IIIc. s/p chemotx (has port)    Psychiatric disorder     Rectal bleeding     Thromboembolus ([address]     POST PARTUM; resolved- PELVIS    Thyroid disease     HYPOTHYROID    Unspecified sleep apnea     CPAP, Dr. Katya Stover     PSH:  Past Surgical History:   Procedure Laterality Date    BREAST SURGERY PROCEDURE UNLISTED      Lymph node in left breast 2017    CHEST SURGERY PROCEDURE UNLISTED      Placement of port a cath right chest    HX GYN       X2    HX HYSTERECTOMY  2012    ROULA/BSO, tumor debulking, omentectomy for ovarian cancer    HX ORTHOPAEDIC      ankle-FX, R    HX ORTHOPAEDIC      FX R ARM    HX UROLOGICAL      stent    HX VASCULAR ACCESS  2015    right chest- port placed     SOC:  Social History     Social History    Marital status:      Spouse name: N/A    Number of children: N/A    Years of education: N/A     Occupational History    Not on file.      Social History Main Topics    Smoking status: Never Smoker    Smokeless tobacco: Never Used    Alcohol use Yes      Comment: 2 DRINKS EVERY 2 MONTHS    Drug use: No    Sexual activity: Yes     Other Topics Concern    Not on file     Social History Narrative    Lives in [address] [place] [address],[address] alone.   passed away in 5/16 of a heart attack. Has 2 sons. Disability. 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 Enjoys swimming and going to the gym. Family History  Family History   Problem Relation Age of Onset    Hypertension Mother     Arthritis-osteo Mother     Lung Disease Father      COPD    Hypertension Father     Arthritis-osteo Father     Cancer Father      PROSTATE    Hypertension Brother     Elevated Lipids Brother     Arthritis-osteo Brother     Hypertension Brother     Elevated Lipids Brother     Obesity Brother     Cancer Brother      PROSTATE    Anesth Problems Son      DELAYED AWAKENING    Diabetes Maternal Grandmother     Anesth Problems Paternal Grandmother      [address] STOPPED DURING SURGERY     Medications: (reviewed)  Current Outpatient Prescriptions on File Prior to Visit   Medication Sig Dispense Refill    valsartan (DIOVAN) 80 mg tablet TAKE 1 TAB BY MOUTH DAILY. 90 Tab 2    LINZESS 145 mcg cap capsule TAKE 1 CAPSULE BY MOUTH EVERY DAY 30 Cap 0    montelukast (SINGULAIR) 10 mg tablet TAKE 1 TAB BY MOUTH DAILY. 90 Tab 1    Dexlansoprazole (DEXILANT) 60 mg CpDB Take 1 Cap by mouth daily. 90 Cap 3    levothyroxine (SYNTHROID) 150 mcg tablet TAKE 1 TAB BY MOUTH DAILY (BEFORE BREAKFAST). --BGLZ [phone] FOR APPOINTMENT FOR MORE REFILLS 90 Tab 0    Liraglutide (VICTOZA) 0.6 mg/0.1 mL (18 mg/3 mL) pnij 1.8 mg by SubCUTAneous route daily (with lunch). 3 Pen 3    insulin detemir (LEVEMIR FLEXTOUCH) 100 unit/mL (3 mL) inpn INJECT 30 UNITS IN AM AND 50 UNITS AT NIGHT. INCREASE AS DIRECTED TO  UNITS PER DAY--CALL [phone] FOR APPOINTMENT FOR MORE REFILLS 30 mL 3    insulin aspart (NOVOLOG FLEXPEN) 100 unit/mL inpn INJECT 20 UNITS BEFORE EACH MEAL + 4 UNITS FOR EVERY 50 MG/DL ABOVE 150 MG/DL.  UNITS PER DAY 30 mL 11    potassium chloride (KLOR-CON M10) 10 mEq tablet TAKE 1 TAB BY MOUTH TWO (2) DAYS A WEEK.  WED AND FRI 90 Tab 1    SYMBICORT 160-4.5 mcg/actuation HFAA Take 2 Puffs by inhalation two (2) times daily as needed. 2 Inhaler 3    omega-3 acid ethyl esters (LOVAZA) 1 gram capsule TAKE ONE CAPSULE BY MOUTH TWICE A  Cap 2    diphenhydrAMINE (BENADRYL) 25 mg capsule Take 25 mg by mouth as needed.  BD INSULIN PEN NEEDLE UF SHORT 31 gauge x 5/16\" ndle USE WITH INSULIN TO INJECT FIVE TIMES DAILY. 200 Pen Needle 11    fluticasone (FLONASE) 50 mcg/actuation nasal spray 2 Sprays by Both Nostrils route daily.  clonazePAM (KLONOPIN) 0.5 mg tablet Take 0.5 mg by mouth nightly as needed.  cycloSPORINE (RESTASIS) 0.05 % ophthalmic emulsion 1 Drop two (2) times a day.  cholecalciferol, VITAMIN D3, (VITAMIN D3) 5,000 unit tab tablet Take 5,000 Units by mouth daily.  furosemide (LASIX) 40 mg tablet Take 80 mg by mouth as needed. 3    fexofenadine (ALLEGRA) 180 mg tablet Take 180 mg by mouth daily.  lidocaine-prilocaine (EMLA) topical cream Apply small amount over port area one hour before chemo treatment and cover with a Band-Aid 30 g 1    LUBRICANT EYE 57.3-42.5 % oint ophthalmic ointment APPLY TOPICALLY INTO BOTH EYES EVERY DAY FOR 3 MONTHS  3    HYDROcodone-acetaminophen (NORCO) 5-325 mg per tablet Take 1 Tab by mouth every four (4) hours as needed for Pain. Max Daily Amount: 6 Tabs. 30 Tab 0    naloxone (EVZIO) 2 mg/0.4 mL auto-injector 0.4 mL by IntraMUSCular route once as needed for Overdose for up to 1 dose. 1 Device 1    albuterol (PROVENTIL VENTOLIN) 2.5 mg /3 mL (0.083 %) nebulizer solution USE 1 VAIL VIA NEBULIZER EVERY 4 HOURS AS NEEDED FOR WHEEZING 3 Package 1    ondansetron (ZOFRAN ODT) 8 mg disintegrating tablet Take 1 Tab by mouth every eight (8) hours as needed for Nausea. 30 Tab 3    nystatin (MYCOSTATIN) powder Apply  to affected area four (4) times daily as needed.       sertraline (ZOLOFT) 50 mg tablet TAKE 1 TABLET BY MOUTH EVERY MORNING  1    albuterol (PROVENTIL HFA, VENTOLIN HFA, PROAIR HFA) 90 mcg/actuation inhaler Take 2 Puffs by inhalation every four (4) hours as needed for Wheezing. No current facility-administered medications on file prior to visit. Allergies: (reviewed)  Allergies   Allergen Reactions    Iodinated Contrast- Oral And Iv Dye Rash     13 hr pre-medication prior to IV contrast    Carboplatin Other (comments)     Patient developed shortness of breath, felt faint, coughing, skin flushed and \"itchy\". This occurred on the patients 8th treatment.  Lipitor [Atorvastatin] Myalgia    Percocet [Oxycodone-Acetaminophen] Other (comments)     fever    Shellfish Containing Products Hives    Tape [Adhesive] Itching and Other (comments)     \"op site-- clear,thin tape\"--caused blister     Tomato Hives       OBJECTIVE:  PHYSICAL EXAM  VITAL SIGNS: Vitals:    06/28/18 1055   BP: 119/74   Pulse: 68   Weight: 306 lb (138.8 kg)   Height: 5' 9\" (1.753 m)     Body mass index is 45.19 kg/(m^2). GENERAL BRIAN: Conversant, alert, oriented. NAD   HEENT: Normocephalic. Oropharynx clear. Neck: Supple. Trachea midline, no JVD. No overt adenopathy   RESPIRATORY: Lung CTA, No rales/rhonchi. Adequate respiratory effort. Post back with linear, crusted lesion(s) 2-7mm. No erythema, nontender. CARDIOVASC: Reg rate/rhythm. Right parasternal firmness, ill defined, nontender. Objectively less palpable. GASTROINT: soft, non-tender, without masses or organomegaly, no fluid wave. Habitus limits exam.   MUSCULOSKEL: FROM. No focal tenderness. EXTREMITIES: bilat 1+ edema   PELVIC: Exam deferred. RECTAL: Deferred   ALCON SURVEY: Negative   NEURO: Grossly intact, no acute deficit. Oriented. Not depressed. Not agitated.      Physical Exam    DATE REVIEW as available:  Lab Results   Component Value Date/Time    WBC 7.2 06/19/2018 10:32 AM    Hemoglobin (POC) 10.5 (L) 03/20/2017 12:15 PM    HGB 8.3 (L) 06/19/2018 10:32 AM    Hematocrit (POC) 31 (L) 03/20/2017 12:15 PM    HCT 27.0 (L) 06/19/2018 10:32 AM    PLATELET 287 17/64/1139 10:32 AM    MCV 96.4 06/19/2018 10:32 AM     Lab Results   Component Value Date/Time    Sodium 143 06/05/2018 10:28 AM    Potassium 3.7 06/05/2018 10:28 AM    Chloride 111 (H) 06/05/2018 10:28 AM    CO2 25 06/05/2018 10:28 AM    Anion gap 7 06/05/2018 10:28 AM    Glucose 130 (H) 06/05/2018 10:28 AM    BUN 20 06/05/2018 10:28 AM    Creatinine 1.17 (H) 06/05/2018 10:28 AM    BUN/Creatinine ratio 17 06/05/2018 10:28 AM    GFR est AA 58 (L) 06/05/2018 10:28 AM    GFR est non-AA 48 (L) 06/05/2018 10:28 AM    Calcium 9.1 06/05/2018 10:28 AM    Bilirubin, total 0.4 06/05/2018 10:28 AM    AST (SGOT) 30 06/05/2018 10:28 AM    Alk. phosphatase 68 06/05/2018 10:28 AM    Protein, total 8.1 06/05/2018 10:28 AM    Albumin 3.3 (L) 06/05/2018 10:28 AM    Globulin 4.8 (H) 06/05/2018 10:28 AM    A-G Ratio 0.7 (L) 06/05/2018 10:28 AM    ALT (SGPT) 25 06/05/2018 10:28 AM          IMPRESSION AND PLAN:  Deepa Dunne [facility] has a working diagnosis of     ICD-10-CM ICD-9-CM    1. Malignant neoplasm of ovary, unspecified laterality (HCC) C56.9 183.0    2. HCV antibody positive R76.8 795.79 HCV AB W/RFLX TO VANE   3. Chest wall mass R22.2 786.6    4. On antineoplastic chemotherapy Z79.899 V58.69    5. Type 2 diabetes mellitus with nephropathy (HCC) E11.21 250.40      583.81    6. Lymphadenopathy, mediastinal R59.0 785.6    7. Stage 3 chronic kidney disease N18.3 585. 3    Karnofsky Score: 100   ECOG stGstrstastdstest:st st1st Continue salvage therapy Taxol, anticipate cycle 3. Notable drop . Continue to monitor renal function  Managing her disease(s) well, remains fully active and independent. Discussed her EOD and encouraged her continue with what she enjoys and working. Will consider doing catch-up genetic panel testing. HCV ab+ on employment screen. Recheck with VANE. To see Dr. Cheron Boeck next month for possible CT scan.       MALLIKA Leon  6/28/2018/10:16 AM

## 2018-07-03 ENCOUNTER — HOSPITAL ENCOUNTER (OUTPATIENT)
Dept: INFUSION THERAPY | Age: 58
Discharge: HOME | End: 2018-07-03
Payer: MEDICARE

## 2018-07-03 VITALS
OXYGEN SATURATION: 97 % | SYSTOLIC BLOOD PRESSURE: 150 MMHG | TEMPERATURE: 96.8 F | HEART RATE: 63 BPM | RESPIRATION RATE: 18 BRPM | HEIGHT: 69 IN | BODY MASS INDEX: 43.4 KG/M2 | DIASTOLIC BLOOD PRESSURE: 81 MMHG | WEIGHT: 293 LBS

## 2018-07-03 LAB
ALBUMIN SERPL-MCNC: 3.4 G/DL (ref 3.5–5)
ALBUMIN/GLOB SERPL: 0.7 {RATIO} (ref 1.1–2.2)
ALP SERPL-CCNC: 72 U/L (ref 45–117)
ALT SERPL-CCNC: 29 U/L (ref 12–78)
ANION GAP SERPL CALC-SCNC: 7 MMOL/L (ref 5–15)
AST SERPL-CCNC: 34 U/L (ref 15–37)
BASOPHILS # BLD: 0 K/UL (ref 0–0.1)
BASOPHILS NFR BLD: 1 % (ref 0–1)
BILIRUB SERPL-MCNC: 0.5 MG/DL (ref 0.2–1)
BUN SERPL-MCNC: 24 MG/DL (ref 6–20)
BUN/CREAT SERPL: 19 (ref 12–20)
CALCIUM SERPL-MCNC: 9 MG/DL (ref 8.5–10.1)
CANCER AG125 SERPL-ACNC: 68 U/ML (ref 1.5–35)
CHLORIDE SERPL-SCNC: 110 MMOL/L (ref 97–108)
CO2 SERPL-SCNC: 25 MMOL/L (ref 21–32)
CREAT SERPL-MCNC: 1.24 MG/DL (ref 0.55–1.02)
DIFFERENTIAL METHOD BLD: ABNORMAL
EOSINOPHIL # BLD: 0.3 K/UL (ref 0–0.4)
EOSINOPHIL NFR BLD: 3 % (ref 0–7)
ERYTHROCYTE [DISTWIDTH] IN BLOOD BY AUTOMATED COUNT: 14.6 % (ref 11.5–14.5)
GLOBULIN SER CALC-MCNC: 4.9 G/DL (ref 2–4)
GLUCOSE SERPL-MCNC: 154 MG/DL (ref 65–100)
HCT VFR BLD AUTO: 28.4 % (ref 35–47)
HGB BLD-MCNC: 8.7 G/DL (ref 11.5–16)
IMM GRANULOCYTES # BLD: 0 K/UL (ref 0–0.04)
IMM GRANULOCYTES NFR BLD AUTO: 0 % (ref 0–0.5)
LYMPHOCYTES # BLD: 2.1 K/UL (ref 0.8–3.5)
LYMPHOCYTES NFR BLD: 26 % (ref 12–49)
MCH RBC QN AUTO: 29.3 PG (ref 26–34)
MCHC RBC AUTO-ENTMCNC: 30.6 G/DL (ref 30–36.5)
MCV RBC AUTO: 95.6 FL (ref 80–99)
MONOCYTES # BLD: 0.7 K/UL (ref 0–1)
MONOCYTES NFR BLD: 9 % (ref 5–13)
NEUTS SEG # BLD: 4.9 K/UL (ref 1.8–8)
NEUTS SEG NFR BLD: 61 % (ref 32–75)
NRBC # BLD: 0 K/UL (ref 0–0.01)
NRBC BLD-RTO: 0 PER 100 WBC
PLATELET # BLD AUTO: 192 K/UL (ref 150–400)
PMV BLD AUTO: 9.4 FL (ref 8.9–12.9)
POTASSIUM SERPL-SCNC: 3.9 MMOL/L (ref 3.5–5.1)
PROT SERPL-MCNC: 8.3 G/DL (ref 6.4–8.2)
RBC # BLD AUTO: 2.97 M/UL (ref 3.8–5.2)
SODIUM SERPL-SCNC: 142 MMOL/L (ref 136–145)
WBC # BLD AUTO: 8.1 K/UL (ref 3.6–11)

## 2018-07-03 PROCEDURE — 74011250636 HC RX REV CODE- 250/636: Performed by: OBSTETRICS & GYNECOLOGY

## 2018-07-03 PROCEDURE — 96413 CHEMO IV INFUSION 1 HR: CPT

## 2018-07-03 PROCEDURE — 80053 COMPREHEN METABOLIC PANEL: CPT | Performed by: OBSTETRICS & GYNECOLOGY

## 2018-07-03 PROCEDURE — 86304 IMMUNOASSAY TUMOR CA 125: CPT | Performed by: OBSTETRICS & GYNECOLOGY

## 2018-07-03 PROCEDURE — 96375 TX/PRO/DX INJ NEW DRUG ADDON: CPT

## 2018-07-03 PROCEDURE — 74011000250 HC RX REV CODE- 250: Performed by: OBSTETRICS & GYNECOLOGY

## 2018-07-03 PROCEDURE — 85025 COMPLETE CBC W/AUTO DIFF WBC: CPT | Performed by: OBSTETRICS & GYNECOLOGY

## 2018-07-03 PROCEDURE — 77030012965 HC NDL HUBR BBMI -A

## 2018-07-03 PROCEDURE — 74011000258 HC RX REV CODE- 258: Performed by: OBSTETRICS & GYNECOLOGY

## 2018-07-03 PROCEDURE — 96361 HYDRATE IV INFUSION ADD-ON: CPT

## 2018-07-03 PROCEDURE — 36415 COLL VENOUS BLD VENIPUNCTURE: CPT | Performed by: OBSTETRICS & GYNECOLOGY

## 2018-07-03 RX ORDER — SODIUM CHLORIDE 0.9 % (FLUSH) 0.9 %
10-40 SYRINGE (ML) INJECTION AS NEEDED
Status: ACTIVE | OUTPATIENT
Start: 2018-07-03 | End: 2018-07-04

## 2018-07-03 RX ORDER — SODIUM CHLORIDE 9 MG/ML
10 INJECTION INTRAMUSCULAR; INTRAVENOUS; SUBCUTANEOUS AS NEEDED
Status: ACTIVE | OUTPATIENT
Start: 2018-07-03 | End: 2018-07-04

## 2018-07-03 RX ORDER — HEPARIN 100 UNIT/ML
500 SYRINGE INTRAVENOUS AS NEEDED
Status: ACTIVE | OUTPATIENT
Start: 2018-07-03 | End: 2018-07-04

## 2018-07-03 RX ORDER — SODIUM CHLORIDE 9 MG/ML
25 INJECTION, SOLUTION INTRAVENOUS AS NEEDED
Status: DISPENSED | OUTPATIENT
Start: 2018-07-03 | End: 2018-07-04

## 2018-07-03 RX ADMIN — SODIUM CHLORIDE 10 ML: 9 INJECTION, SOLUTION INTRAMUSCULAR; INTRAVENOUS; SUBCUTANEOUS at 10:35

## 2018-07-03 RX ADMIN — PACLITAXEL 120 MG: 6 INJECTION, SOLUTION INTRAVENOUS at 13:43

## 2018-07-03 RX ADMIN — SODIUM CHLORIDE, PRESERVATIVE FREE 500 UNITS: 5 INJECTION INTRAVENOUS at 15:00

## 2018-07-03 RX ADMIN — Medication 10 ML: at 15:00

## 2018-07-03 RX ADMIN — SODIUM CHLORIDE 1000 ML: 900 INJECTION, SOLUTION INTRAVENOUS at 12:12

## 2018-07-03 RX ADMIN — Medication 20 ML: at 10:35

## 2018-07-03 RX ADMIN — FAMOTIDINE 20 MG: 10 INJECTION, SOLUTION INTRAVENOUS at 12:10

## 2018-07-03 RX ADMIN — DIPHENHYDRAMINE HYDROCHLORIDE 50 MG: 50 INJECTION INTRAMUSCULAR; INTRAVENOUS at 12:19

## 2018-07-03 RX ADMIN — DEXAMETHASONE SODIUM PHOSPHATE 10 MG: 4 INJECTION, SOLUTION INTRAMUSCULAR; INTRAVENOUS at 12:17

## 2018-07-03 RX ADMIN — Medication 10 ML: at 12:12

## 2018-07-03 NOTE — PROGRESS NOTES
1015 Pt admit to [facility] for C 3, D 1 Paclitaxel ambulatory in stable condition. Accompanied by supportive son. Assessment completed, continues to have neuropathy, ECKERT, lower extremity edema, admitted today with HTN. No new concerns voiced. Port accessed with positive blood return. Labs drawn per order and sent. Line flushed, clamped, Curos Cap applied to end clave while awaiting results. 1200 spoke with Juana via phone and relayed today's lab results. 1 Liter Normal Saline ordered and infused. Chemo ordered, premeds given. Visit Vitals    /77 (BP 1 Location: Left arm, BP Patient Position: Sitting)    Pulse 83    Temp 98.5 °F (36.9 °C)    Resp 18    Ht 5' 8.9\" (1.75 m)    Wt 138.8 kg (306 lb)    LMP 09/07/2011    SpO2 96%    BMI 45.32 kg/m2       Medications:  Normal Saline 1 liter bolus  Benadryl  Pepcid  Decadron  Paclitaxel    1500 Pt tolerated treatment well. Port maintained positive blood return throughout treatment, flushed with positive blood return at conclusion and heparinized per protocol. D/c home ambulatory in no distress. Pt aware of next [facility] appointment scheduled for 7/10/18. Recent Results (from the past 12 hour(s))   CBC WITH AUTOMATED DIFF    Collection Time: 07/03/18 10:35 AM   Result Value Ref Range    WBC 8.1 3.6 - 11.0 K/uL    RBC 2.97 (L) 3.80 - 5.20 M/uL    HGB 8.7 (L) 11.5 - 16.0 g/dL    HCT 28.4 (L) 35.0 - 47.0 %    MCV 95.6 80.0 - 99.0 FL    MCH 29.3 26.0 - 34.0 PG    MCHC 30.6 30.0 - 36.5 g/dL    RDW 14.6 (H) 11.5 - 14.5 %    PLATELET 784 498 - 018 K/uL    MPV 9.4 8.9 - 12.9 FL    NRBC 0.0 0  WBC    ABSOLUTE NRBC 0.00 0.00 - 0.01 K/uL    NEUTROPHILS 61 32 - 75 %    LYMPHOCYTES 26 12 - 49 %    MONOCYTES 9 5 - 13 %    EOSINOPHILS 3 0 - 7 %    BASOPHILS 1 0 - 1 %    IMMATURE GRANULOCYTES 0 0.0 - 0.5 %    ABS. NEUTROPHILS 4.9 1.8 - 8.0 K/UL    ABS. LYMPHOCYTES 2.1 0.8 - 3.5 K/UL    ABS. MONOCYTES 0.7 0.0 - 1.0 K/UL    ABS. EOSINOPHILS 0.3 0.0 - 0.4 K/UL    ABS. BASOPHILS 0.0 0.0 - 0.1 K/UL    ABS. IMM. GRANS. 0.0 0.00 - 0.04 K/UL    DF AUTOMATED     METABOLIC PANEL, COMPREHENSIVE    Collection Time: 07/03/18 10:35 AM   Result Value Ref Range    Sodium 142 136 - 145 mmol/L    Potassium 3.9 3.5 - 5.1 mmol/L    Chloride 110 (H) 97 - 108 mmol/L    CO2 25 21 - 32 mmol/L    Anion gap 7 5 - 15 mmol/L    Glucose 154 (H) 65 - 100 mg/dL    BUN 24 (H) 6 - 20 MG/DL    Creatinine 1.24 (H) 0.55 - 1.02 MG/DL    BUN/Creatinine ratio 19 12 - 20      GFR est AA 54 (L) >60 ml/min/1.73m2    GFR est non-AA 45 (L) >60 ml/min/1.73m2    Calcium 9.0 8.5 - 10.1 MG/DL    Bilirubin, total 0.5 0.2 - 1.0 MG/DL    ALT (SGPT) 29 12 - 78 U/L    AST (SGOT) 34 15 - 37 U/L    Alk.  phosphatase 72 45 - 117 U/L    Protein, total 8.3 (H) 6.4 - 8.2 g/dL    Albumin 3.4 (L) 3.5 - 5.0 g/dL    Globulin 4.9 (H) 2.0 - 4.0 g/dL    A-G Ratio 0.7 (L) 1.1 - 2.2     CANCER ANTIGEN 125    Collection Time: 07/03/18 10:35 AM   Result Value Ref Range    CA-125 68 (H) 1.5 - 35.0 U/mL

## 2018-07-04 LAB
HCV AB S/CO SERPL IA: 2.2 S/CO RATIO (ref 0–0.9)
HCV RNA SERPL QL NAA+PROBE: NEGATIVE
INTERPRETATION: NORMAL

## 2018-07-05 RX ORDER — DIPHENHYDRAMINE HYDROCHLORIDE 50 MG/ML
50 INJECTION, SOLUTION INTRAMUSCULAR; INTRAVENOUS ONCE
Status: COMPLETED | OUTPATIENT
Start: 2018-07-10 | End: 2018-07-10

## 2018-07-05 RX ORDER — DEXAMETHASONE SODIUM PHOSPHATE 4 MG/ML
10 INJECTION, SOLUTION INTRA-ARTICULAR; INTRALESIONAL; INTRAMUSCULAR; INTRAVENOUS; SOFT TISSUE ONCE
Status: COMPLETED | OUTPATIENT
Start: 2018-07-10 | End: 2018-07-10

## 2018-07-09 ENCOUNTER — TELEPHONE (OUTPATIENT)
Dept: GYNECOLOGY | Age: 58
End: 2018-07-09

## 2018-07-09 DIAGNOSIS — C56.9 MALIGNANT NEOPLASM OF OVARY, UNSPECIFIED LATERALITY (HCC): Primary | ICD-10-CM

## 2018-07-09 DIAGNOSIS — Z79.899 ON ANTINEOPLASTIC CHEMOTHERAPY: ICD-10-CM

## 2018-07-09 DIAGNOSIS — Z91.041 CONTRAST MEDIA ALLERGY: ICD-10-CM

## 2018-07-09 DIAGNOSIS — R22.2 CHEST WALL MASS: ICD-10-CM

## 2018-07-09 DIAGNOSIS — E11.21 TYPE 2 DIABETES MELLITUS WITH NEPHROPATHY (HCC): ICD-10-CM

## 2018-07-09 RX ORDER — DIPHENHYDRAMINE HCL 25 MG
CAPSULE ORAL
Qty: 2 CAP | Refills: 0 | Status: ON HOLD | OUTPATIENT
Start: 2018-07-09 | End: 2018-07-17

## 2018-07-09 RX ORDER — PREDNISONE 10 MG/1
TABLET ORAL
Qty: 15 TAB | Refills: 0 | Status: ON HOLD | OUTPATIENT
Start: 2018-07-09 | End: 2018-07-17

## 2018-07-09 RX ORDER — FAMOTIDINE 40 MG/1
TABLET, FILM COATED ORAL
Qty: 1 TAB | Refills: 0 | Status: ON HOLD | OUTPATIENT
Start: 2018-07-09 | End: 2018-07-17

## 2018-07-09 RX ORDER — OMEGA-3-ACID ETHYL ESTERS 1 G/1
CAPSULE, LIQUID FILLED ORAL
Qty: 180 CAP | Refills: 2 | Status: SHIPPED | OUTPATIENT
Start: 2018-07-09 | End: 2019-04-30 | Stop reason: SDUPTHER

## 2018-07-09 NOTE — TELEPHONE ENCOUNTER
Pt  to see if Dr. Alena Castle wanted to do another chest xray, pt is already scheduled for CT on 7/23/18 that includes chest, abd and pelvis, pt verbalized understanding

## 2018-07-10 ENCOUNTER — HOSPITAL ENCOUNTER (OUTPATIENT)
Dept: INFUSION THERAPY | Age: 58
Discharge: HOME | End: 2018-07-10
Payer: MEDICARE

## 2018-07-10 VITALS
HEIGHT: 69 IN | HEART RATE: 70 BPM | RESPIRATION RATE: 18 BRPM | BODY MASS INDEX: 43.4 KG/M2 | DIASTOLIC BLOOD PRESSURE: 85 MMHG | TEMPERATURE: 97.8 F | SYSTOLIC BLOOD PRESSURE: 146 MMHG | WEIGHT: 293 LBS

## 2018-07-10 LAB
BASOPHILS # BLD: 0.1 K/UL (ref 0–0.1)
BASOPHILS NFR BLD: 1 % (ref 0–1)
DIFFERENTIAL METHOD BLD: ABNORMAL
EOSINOPHIL # BLD: 0.4 K/UL (ref 0–0.4)
EOSINOPHIL NFR BLD: 5 % (ref 0–7)
ERYTHROCYTE [DISTWIDTH] IN BLOOD BY AUTOMATED COUNT: 14.6 % (ref 11.5–14.5)
HCT VFR BLD AUTO: 26.8 % (ref 35–47)
HGB BLD-MCNC: 8.4 G/DL (ref 11.5–16)
IMM GRANULOCYTES # BLD: 0.1 K/UL (ref 0–0.04)
IMM GRANULOCYTES NFR BLD AUTO: 1 % (ref 0–0.5)
LYMPHOCYTES # BLD: 2.4 K/UL (ref 0.8–3.5)
LYMPHOCYTES NFR BLD: 30 % (ref 12–49)
MCH RBC QN AUTO: 30 PG (ref 26–34)
MCHC RBC AUTO-ENTMCNC: 31.3 G/DL (ref 30–36.5)
MCV RBC AUTO: 95.7 FL (ref 80–99)
MONOCYTES # BLD: 0.5 K/UL (ref 0–1)
MONOCYTES NFR BLD: 7 % (ref 5–13)
NEUTS SEG # BLD: 4.5 K/UL (ref 1.8–8)
NEUTS SEG NFR BLD: 57 % (ref 32–75)
NRBC # BLD: 0 K/UL (ref 0–0.01)
NRBC BLD-RTO: 0 PER 100 WBC
PLATELET # BLD AUTO: 173 K/UL (ref 150–400)
PMV BLD AUTO: 9.7 FL (ref 8.9–12.9)
RBC # BLD AUTO: 2.8 M/UL (ref 3.8–5.2)
WBC # BLD AUTO: 7.9 K/UL (ref 3.6–11)

## 2018-07-10 PROCEDURE — 85025 COMPLETE CBC W/AUTO DIFF WBC: CPT | Performed by: OBSTETRICS & GYNECOLOGY

## 2018-07-10 PROCEDURE — 96375 TX/PRO/DX INJ NEW DRUG ADDON: CPT

## 2018-07-10 PROCEDURE — 74011250636 HC RX REV CODE- 250/636: Performed by: OBSTETRICS & GYNECOLOGY

## 2018-07-10 PROCEDURE — 77030012965 HC NDL HUBR BBMI -A

## 2018-07-10 PROCEDURE — 74011000250 HC RX REV CODE- 250: Performed by: OBSTETRICS & GYNECOLOGY

## 2018-07-10 PROCEDURE — 96413 CHEMO IV INFUSION 1 HR: CPT

## 2018-07-10 PROCEDURE — 74011000258 HC RX REV CODE- 258: Performed by: OBSTETRICS & GYNECOLOGY

## 2018-07-10 RX ORDER — SODIUM CHLORIDE 9 MG/ML
10 INJECTION INTRAMUSCULAR; INTRAVENOUS; SUBCUTANEOUS AS NEEDED
Status: ACTIVE | OUTPATIENT
Start: 2018-07-10 | End: 2018-07-11

## 2018-07-10 RX ORDER — HEPARIN 100 UNIT/ML
500 SYRINGE INTRAVENOUS AS NEEDED
Status: ACTIVE | OUTPATIENT
Start: 2018-07-10 | End: 2018-07-11

## 2018-07-10 RX ORDER — SODIUM CHLORIDE 0.9 % (FLUSH) 0.9 %
5-10 SYRINGE (ML) INJECTION AS NEEDED
Status: ACTIVE | OUTPATIENT
Start: 2018-07-10 | End: 2018-07-11

## 2018-07-10 RX ADMIN — SODIUM CHLORIDE, PRESERVATIVE FREE 500 UNITS: 5 INJECTION INTRAVENOUS at 14:22

## 2018-07-10 RX ADMIN — FAMOTIDINE 20 MG: 10 INJECTION, SOLUTION INTRAVENOUS at 12:30

## 2018-07-10 RX ADMIN — PACLITAXEL 120 MG: 6 INJECTION, SOLUTION INTRAVENOUS at 13:23

## 2018-07-10 RX ADMIN — DIPHENHYDRAMINE HYDROCHLORIDE 50 MG: 50 INJECTION INTRAMUSCULAR; INTRAVENOUS at 12:24

## 2018-07-10 RX ADMIN — SODIUM CHLORIDE 10 ML: 9 INJECTION INTRAMUSCULAR; INTRAVENOUS; SUBCUTANEOUS at 14:22

## 2018-07-10 RX ADMIN — DEXAMETHASONE SODIUM PHOSPHATE 10 MG: 4 INJECTION, SOLUTION INTRAMUSCULAR; INTRAVENOUS at 12:33

## 2018-07-10 NOTE — PROGRESS NOTES
1045 Pt admit to [facility] for C3D8 Paclitaxel ambulatory in stable condition. Assessment completed. No new concerns voiced. Port accessed and flushed with positive blood return. Labs drawn per order and sent for processing. Normal Saline started at [facility]. Labs reviewed medication ordered. Visit Vitals    /85    Pulse 70    Temp 97.8 °F (36.6 °C)    Resp 18    Ht 5' 8.9\" (1.75 m)    Wt 139.3 kg (307 lb)    LMP 09/07/2011    BMI 45.47 kg/m2       Medications:  Normal Saline KVO  Benadryl IV Push  Pepcid IV Push  Decadron IV Push  Paclitaxel     1425 Pt tolerated treatment well. Port maintained positive blood return throughout treatment, flushed with positive blood return at conclusion and port heparinized and de-accessed per protocol. D/c home ambulatory in no distress. Pt aware of next [facility] appointment scheduled for 7/17/18    Recent Results (from the past 12 hour(s))   CBC WITH AUTOMATED DIFF    Collection Time: 07/10/18 10:49 AM   Result Value Ref Range    WBC 7.9 3.6 - 11.0 K/uL    RBC 2.80 (L) 3.80 - 5.20 M/uL    HGB 8.4 (L) 11.5 - 16.0 g/dL    HCT 26.8 (L) 35.0 - 47.0 %    MCV 95.7 80.0 - 99.0 FL    MCH 30.0 26.0 - 34.0 PG    MCHC 31.3 30.0 - 36.5 g/dL    RDW 14.6 (H) 11.5 - 14.5 %    PLATELET 075 459 - 458 K/uL    MPV 9.7 8.9 - 12.9 FL    NRBC 0.0 0  WBC    ABSOLUTE NRBC 0.00 0.00 - 0.01 K/uL    NEUTROPHILS 57 32 - 75 %    LYMPHOCYTES 30 12 - 49 %    MONOCYTES 7 5 - 13 %    EOSINOPHILS 5 0 - 7 %    BASOPHILS 1 0 - 1 %    IMMATURE GRANULOCYTES 1 (H) 0.0 - 0.5 %    ABS. NEUTROPHILS 4.5 1.8 - 8.0 K/UL    ABS. LYMPHOCYTES 2.4 0.8 - 3.5 K/UL    ABS. MONOCYTES 0.5 0.0 - 1.0 K/UL    ABS. EOSINOPHILS 0.4 0.0 - 0.4 K/UL    ABS. BASOPHILS 0.1 0.0 - 0.1 K/UL    ABS. IMM.  GRANS. 0.1 (H) 0.00 - 0.04 K/UL    DF AUTOMATED

## 2018-07-10 NOTE — PROGRESS NOTES
Kimberly Galvan. Mary, MALLIKA Anderson      Date of visit: 7/10/2018       HPI:   Ms. Marcella Sigala is a long time pt of Dr. Yomi Lipscomb. She was initially dx with ovarian cancer in Sept 2012. She had MARAH/BSO/Cytoreductive surgery on 9/21/12. At that time she was Stage III C. She recovered and began 6 cycles of Carbo/Taxol. Had good response, but reoccurred and in Fall of 2014 she received 3 cycles of Carbo/Taxol before having a reaction to Carbo and this was stopped. She continued her next 3 cycles with Doxil/Avastin. This did not decrease disease. .   She had a treatment break from 3/2015 until she restarted with Topatecan for 11 cycles from July 2015 through March 2016. She was followed by Dr. Yomi Lipscomb and in Jan 2017, she had a CT scan that showed recurrence of disease. She was given Gemzar with good results. In April 2018, she presented to the [address] [place] [address] ER with c/o CP and a Chest CT  showed chest wall mass in the right parasternal region that invades the sternum with \"massive adenopathy in the mediastinum and increasing adenopathy in the retrocrural region and upper abd. CT guided bx of chest wall mass was performed that showed Metastatic high-grade serous carcinoma   At this point, Dr. Yomi Lipscomb recommended weekly Taxol D1, 8, 15 (he discussed the palliative nature of this treatment)  She began this on 5/8/18. Subjective: Today she presents for C3D2. Pt says she continues feeling very good on this treatment and is able to continue to be active and \"normal\" and she is very please with this. 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 Pt remains very happy about this  Creat has a slight bump to 1.24 from 1.17.  Pt says she has been trying to drink water, but feels with the heat, it is harder to keep up      Diagnosis: Ovarian cancer  Original Surgery: 9/21/12 Ex-lap, ROULA/BSO, radical tumor debulking  Pathology: High grade serious ovarian cancer with omental cake and carcinomatosis. Stage IIIC    Oncology treatment history:  9/2012: Dx with ovarian cancer in Sept 2012. She had MARAH/BSO/Cytoreductive surgery on 9/21/12. At that time she was Stage III C  10/2012-2/2013: 6 cycles Carbo/Taxol  8/2014-11/2014: 6 Cycles carbo/Taxol  3/2015-5/13/15: 3 cycles Avastin/Doxil until progression  7/2015-8/2015 Weekly Topotecan   12/2015-3/2016: Weekly Topotecan  2/2017-9/2017:  Gemzar D1/D8 Q28 days for 6 cycles  5/8/2018-Present: Taxol  D1,8,15 for scheduled 3  cycles      Current Outpatient Prescriptions   Medication Sig    omega-3 acid ethyl esters (LOVAZA) 1 gram capsule TAKE ONE CAPSULE BY MOUTH TWICE A DAY    predniSONE (DELTASONE) 10 mg tablet Take 5 tabs po 13 hours before CT scan start time, 8 hours before CT scan and 1 hour before.  diphenhydrAMINE (BENADRYL) 25 mg capsule Take 50mg 1 hour before your CT scan    famotidine (PEPCID) 40 mg tablet Take one tab 1 hour PO before your CT scan    valsartan (DIOVAN) 80 mg tablet TAKE 1 TAB BY MOUTH DAILY.  LINZESS 145 mcg cap capsule TAKE 1 CAPSULE BY MOUTH EVERY DAY    montelukast (SINGULAIR) 10 mg tablet TAKE 1 TAB BY MOUTH DAILY.  Dexlansoprazole (DEXILANT) 60 mg CpDB Take 1 Cap by mouth daily.  lidocaine-prilocaine (EMLA) topical cream Apply small amount over port area one hour before chemo treatment and cover with a Band-Aid    LUBRICANT EYE 57.3-42.5 % oint ophthalmic ointment APPLY TOPICALLY INTO BOTH EYES EVERY DAY FOR 3 MONTHS    HYDROcodone-acetaminophen (NORCO) 5-325 mg per tablet Take 1 Tab by mouth every four (4) hours as needed for Pain. Max Daily Amount: 6 Tabs.  naloxone (EVZIO) 2 mg/0.4 mL auto-injector 0.4 mL by IntraMUSCular route once as needed for Overdose for up to 1 dose.  levothyroxine (SYNTHROID) 150 mcg tablet TAKE 1 TAB BY MOUTH DAILY (BEFORE BREAKFAST). --CALL [phone] FOR APPOINTMENT FOR MORE REFILLS    Liraglutide (VICTOZA) 0.6 mg/0.1 mL (18 mg/3 mL) pnij 1.8 mg by SubCUTAneous route daily (with lunch).  insulin detemir (LEVEMIR FLEXTOUCH) 100 unit/mL (3 mL) inpn INJECT 30 UNITS IN AM AND 50 UNITS AT NIGHT. INCREASE AS DIRECTED TO  UNITS PER DAY--CALL [phone] FOR APPOINTMENT FOR MORE REFILLS    insulin aspart (NOVOLOG FLEXPEN) 100 unit/mL inpn INJECT 20 UNITS BEFORE EACH MEAL + 4 UNITS FOR EVERY 50 MG/DL ABOVE 150 MG/DL.  UNITS PER DAY    potassium chloride (KLOR-CON M10) 10 mEq tablet TAKE 1 TAB BY MOUTH TWO (2) DAYS A WEEK. WED AND FRI    SYMBICORT 160-4.5 mcg/actuation HFAA Take 2 Puffs by inhalation two (2) times daily as needed.  diphenhydrAMINE (BENADRYL) 25 mg capsule Take 25 mg by mouth as needed.  albuterol (PROVENTIL VENTOLIN) 2.5 mg /3 mL (0.083 %) nebulizer solution USE 1 VAIL VIA NEBULIZER EVERY 4 HOURS AS NEEDED FOR WHEEZING    ondansetron (ZOFRAN ODT) 8 mg disintegrating tablet Take 1 Tab by mouth every eight (8) hours as needed for Nausea.  BD INSULIN PEN NEEDLE UF SHORT 31 gauge x 5/16\" ndle USE WITH INSULIN TO INJECT FIVE TIMES DAILY.  fluticasone (FLONASE) 50 mcg/actuation nasal spray 2 Sprays by Both Nostrils route daily.  clonazePAM (KLONOPIN) 0.5 mg tablet Take 0.5 mg by mouth nightly as needed.  nystatin (MYCOSTATIN) powder Apply  to affected area four (4) times daily as needed.  cycloSPORINE (RESTASIS) 0.05 % ophthalmic emulsion 1 Drop two (2) times a day.  sertraline (ZOLOFT) 50 mg tablet TAKE 1 TABLET BY MOUTH EVERY MORNING    cholecalciferol, VITAMIN D3, (VITAMIN D3) 5,000 unit tab tablet Take 5,000 Units by mouth daily.  furosemide (LASIX) 40 mg tablet Take 80 mg by mouth as needed.  albuterol (PROVENTIL HFA, VENTOLIN HFA, PROAIR HFA) 90 mcg/actuation inhaler Take 2 Puffs by inhalation every four (4) hours as needed for Wheezing.     fexofenadine (ALLEGRA) 180 mg tablet Take 180 mg by mouth daily. Current Facility-Administered Medications   Medication Dose Route Frequency    sodium chloride 0.9% injection 10 mL  10 mL IntraVENous PRN    heparin (porcine) pf 500 Units  500 Units IntraVENous PRN    sodium chloride (NS) flush 5-10 mL  5-10 mL IntraVENous PRN        Review of Systems:  General: She is in good spirits and denies generalized complaints  HEENT: Denies visual changes, dysphagia or headache   Resp: Denies SOB, or change in ECKERT, wheezing or cough  CV: Denies CP, palpitations. Continues with chest pressure where she has her sternal mass. She says it is the same discomfort as she has had since they discovered it and denies it changing or radiating   GI/: Denies N/V, diarrhea, constipation or dysuria   MuskSkel:Continues with DJD knee pain. She continues to complains of left leg swelling and discomfort to lower leg. Says she is keeping it elevated (dopplers negative on 5/23). Says she is trying to use [place] stocking as instructed by PA, but says she has not seen a difference. Denies numbness, feeling of coolness or weakness. None to right   Neuro: Denies dizzyness or syncope    Objective:     Patient Vitals for the past 8 hrs:   BP Temp Pulse Resp Height Weight   07/10/18 1422 146/85 97.8 °F (36.6 °C) 70 18 - -   07/10/18 1045 149/80 97.3 °F (36.3 °C) 77 18 5' 8.9\" (1.75 m) 307 lb (139.3 kg)       Physical Exam:  General: A&O X3 in NAD with pleasant affect. HEENT: Sclera anicteric, pupils equal and reactive to light. Mucosa pink, moist without lesions  Neck: No JVD. No cervical adenopathy appreciated. Heart: Regular with persistent, 3/6 murmur. Mild parasternal firmness persist, but less tender per pt with palpation and now, less palpable  Lungs: CTA Bilat without wheezing or rales. Improved airation throughout. Abd: Soft, obese, NT/ND with + BS throughout. Ext: Left lower ext with good pulses and color. 2+ non-pitting edema noted (improved) .   Right leg with + pedal pulses with 1+ edema   Neuro: grossly intact    Recent Results (from the past 12 hour(s))   CBC WITH AUTOMATED DIFF    Collection Time: 07/10/18 10:49 AM   Result Value Ref Range    WBC 7.9 3.6 - 11.0 K/uL    RBC 2.80 (L) 3.80 - 5.20 M/uL    HGB 8.4 (L) 11.5 - 16.0 g/dL    HCT 26.8 (L) 35.0 - 47.0 %    MCV 95.7 80.0 - 99.0 FL    MCH 30.0 26.0 - 34.0 PG    MCHC 31.3 30.0 - 36.5 g/dL    RDW 14.6 (H) 11.5 - 14.5 %    PLATELET 907 660 - 622 K/uL    MPV 9.7 8.9 - 12.9 FL    NRBC 0.0 0  WBC    ABSOLUTE NRBC 0.00 0.00 - 0.01 K/uL    NEUTROPHILS 57 32 - 75 %    LYMPHOCYTES 30 12 - 49 %    MONOCYTES 7 5 - 13 %    EOSINOPHILS 5 0 - 7 %    BASOPHILS 1 0 - 1 %    IMMATURE GRANULOCYTES 1 (H) 0.0 - 0.5 %    ABS. NEUTROPHILS 4.5 1.8 - 8.0 K/UL    ABS. LYMPHOCYTES 2.4 0.8 - 3.5 K/UL    ABS. MONOCYTES 0.5 0.0 - 1.0 K/UL    ABS. EOSINOPHILS 0.4 0.0 - 0.4 K/UL    ABS. BASOPHILS 0.1 0.0 - 0.1 K/UL    ABS. IMM. GRANS. 0.1 (H) 0.00 - 0.04 K/UL    DF AUTOMATED         IMAGING:  CT Chest 4/10/18  FINDINGS:  LUNGS: There is a rounded patchy area of airspace process in the periphery of  the superior segment of the right lower lobe as well as patchy areas along the  periphery just inferior to it. Similar finding noted in the periphery of the  lingula as well as more centrally in the lingula. These may be areas of acute  airspace process. Dolly Shadow PLEURA: There is a chest wall mass which lies along the right side of the  proximal body of the sternum which has pleural base and extends through the  chest wall as well as eroding the adjacent sternum. On axial imaging this mass  is 4.9 x 4.4 cm in size. This may account for the patient's right chest wall  symptoms.   TRACHEA/BRONCHI: Patent      The absence of intravenous contrast reduces the sensitivity for evaluation of  the mediastinum and upper abdominal organs.     MEDIASTINUM/SWATI: There is massive adenopathy in the superior mediastinum as  well as adenopathy which extends into the hilar regions bilaterally and in the  subcarinal region. Adenopathy is also identified in the pericardial lymph nodes  on the right. Largest of these measures 2.1 cm. There is also an enlarged lymph  node along the left posterior margin of the manubrium/body of the sternum which  measures 2.9 cm. .     AORTA:  The aorta has a normal contour with no evidence of aneurysm.     UPPER ABDOMEN: On previous abdominal CT adenopathy was noted in the upper  abdomen. There are retrocrural lymph nodes as well the largest of which on the  left measures 1.4 x 3.7 cm. Previously this lymph node measured 0.8 x 3.1 cm. There is marked adenopathy in the celiac axis extending into the hepatic hilus. This also extends into the area of the falciform ligament. This is more  extensive than on the previous study. There is also perigastric adenopathy on  the left which is now measures approximately 5.3 x 7.4 cm. Previously this was  5.2 x 4.0 cm. .     BONES: As mentioned previously there is lytic erosion along the right side of  the proximal body of the sternum adjacent to the mass. There is a lucency in the  T4 vertebral body.       IMPRESSION:    1. There is a chest wall mass in the right parasternal region which invades the  sternum and is seen on both the internal internal as well as external sides of  the chest wall. This may account for the patient's symptoms of right-sided chest  pain. 2. There are patchy areas of airspace process bilaterally which may represent  areas of acute process in the to be correlated clinically. 3. Massive adenopathy in the mediastinum noted. 4. Increasing adenopathy in the retrocrural region and upper abdomen. .        Pathology: 9/2012:  FINAL PATHOLOGIC DIAGNOSIS  1. Omentum, omentectomy:  Serous carcinoma, high grade  2. Soft tissue, umbilicus, biopsy:  Serous carcinoma, high grade  3. Peritoneum, periappendiceal, biopsy:  Serous carcinoma, high grade  4.  Colonic mesentery, biopsy:  Serous carcinoma, high grade  See comment  5. Uterus (157 grams), ovaries and fallopian tubes, supracervical hysterectomy and bilateral salpingooophorectomy:  Cervix: No diagnostic pathologic abnormality  Myometrium: No diagnostic pathologic abnormality  Endometrium: Benign polyp and cystic atrophy  Ovaries and fallopian tubes: Serous carcinoma, high grade  Serosa: Serous carcinoma implants, high-grade  See synoptic report  Synoptic Report:  Specimen: Uterus, ovaries and fallopian tubes, omentum  Procedure: Supracervical hysterectomy, bilateral salpingo-oophorectomy, omentectomy  Lymph node sampling: No lymph nodes present  Specimen integrity:  Right ovary: Intact        Assessment:     Patient Active Problem List   Diagnosis Code    Carcinomatosis ([address] C80.0    Controlled type 2 diabetes mellitus without complication, with long-term current use of insulin ([address] E11.9, Z79.4    Morbid obesity ([address] E66.01    Abnormal mammogram R92.8    Sleep apnea G47.30    Dyslipidemia (high LDL; low HDL) E78.5    Benign essential hypertension I10    Disorder of thyroid E07.9    CINV (chemotherapy-induced nausea and vomiting) R11.2, T45.1X5A    Portacath in place--left Z95.828    Chest pain, atypical R07.89    Hx of pulmonary embolus during pregnancy Z86.711, Z87.59    Ovarian cancer (HCC) C56.9    Hyperglycemia due to type 2 diabetes mellitus ([address] E11.65    Angina pectoris associated with type 2 diabetes mellitus (HCC) E11.59, I20.9    Renal calculi N20.0    Elevated BUN R79.9    Pulmonary hypertension, mild (HCC) I27.20    Lymphadenopathy, mediastinal R59.0    Type 2 diabetes mellitus with nephropathy (HCC) E11.21    Chest mass R22.2    Chest wall mass R22.2    On antineoplastic chemotherapy Z79.899    Reactive depression F32.9    Stage 3 chronic kidney disease N18.3    Contrast media allergy Z91.041         Plan:    Will proceed with C3 D8 of Taxol  Encouraged to continue to drink pleanty of water and if she feels she is dehydrated, to call and we will arrange for her to have some hydration between infusions  Follow up with cardiologist as prn  If she has ANY chest pain or associated pain or concern, she was instructed to proceed directly to the ER  Follow up with PCP re-DM/HTN/Lipids as scheduled  Continue current meds  Hydration encouraged  Encouraged to call for any concerns or questions  Rutherford Schwab, NP

## 2018-07-12 RX ORDER — DIPHENHYDRAMINE HYDROCHLORIDE 50 MG/ML
50 INJECTION, SOLUTION INTRAMUSCULAR; INTRAVENOUS ONCE
Status: DISCONTINUED | OUTPATIENT
Start: 2018-07-17 | End: 2018-07-17

## 2018-07-12 RX ORDER — DEXAMETHASONE SODIUM PHOSPHATE 4 MG/ML
10 INJECTION, SOLUTION INTRA-ARTICULAR; INTRALESIONAL; INTRAMUSCULAR; INTRAVENOUS; SOFT TISSUE ONCE
Status: DISCONTINUED | OUTPATIENT
Start: 2018-07-17 | End: 2018-07-17

## 2018-07-16 RX ORDER — HEPARIN 100 UNIT/ML
500 SYRINGE INTRAVENOUS AS NEEDED
Status: CANCELLED | OUTPATIENT
Start: 2018-07-17 | End: 2018-07-17

## 2018-07-16 RX ORDER — SODIUM CHLORIDE 0.9 % (FLUSH) 0.9 %
10-40 SYRINGE (ML) INJECTION AS NEEDED
Status: CANCELLED | OUTPATIENT
Start: 2018-07-17 | End: 2018-07-17

## 2018-07-16 RX ORDER — SODIUM CHLORIDE 9 MG/ML
10 INJECTION INTRAMUSCULAR; INTRAVENOUS; SUBCUTANEOUS AS NEEDED
Status: CANCELLED | OUTPATIENT
Start: 2018-07-17 | End: 2018-07-17

## 2018-07-17 ENCOUNTER — APPOINTMENT (OUTPATIENT)
Dept: CT IMAGING | Age: 58
End: 2018-07-17
Attending: NURSE PRACTITIONER
Payer: MEDICARE

## 2018-07-17 ENCOUNTER — HOSPITAL ENCOUNTER (OUTPATIENT)
Age: 58
Setting detail: OBSERVATION
Discharge: HOME | End: 2018-07-19
Attending: OBSTETRICS & GYNECOLOGY | Admitting: OBSTETRICS & GYNECOLOGY
Payer: MEDICARE

## 2018-07-17 ENCOUNTER — APPOINTMENT (OUTPATIENT)
Dept: GENERAL RADIOLOGY | Age: 58
End: 2018-07-17
Attending: NURSE PRACTITIONER
Payer: MEDICARE

## 2018-07-17 ENCOUNTER — HOSPITAL ENCOUNTER (OUTPATIENT)
Dept: INFUSION THERAPY | Age: 58
Discharge: HOME | End: 2018-07-17
Payer: MEDICARE

## 2018-07-17 DIAGNOSIS — R05.9 COUGH: Primary | ICD-10-CM

## 2018-07-17 PROBLEM — R07.9 CHEST PAIN: Status: ACTIVE | Noted: 2018-07-17

## 2018-07-17 PROBLEM — N18.1 STAGE 1 CHRONIC KIDNEY DISEASE: Status: ACTIVE | Noted: 2018-05-31

## 2018-07-17 PROBLEM — D64.9 ANEMIA: Status: ACTIVE | Noted: 2018-07-17

## 2018-07-17 PROBLEM — R79.9 ELEVATED BUN: Status: RESOLVED | Noted: 2017-03-02 | Resolved: 2018-07-17

## 2018-07-17 PROBLEM — R06.02 SOB (SHORTNESS OF BREATH): Status: ACTIVE | Noted: 2018-07-17

## 2018-07-17 PROBLEM — E11.9 DIABETES (HCC): Status: ACTIVE | Noted: 2018-07-17

## 2018-07-17 PROBLEM — E03.9 ACQUIRED HYPOTHYROIDISM: Status: ACTIVE | Noted: 2018-07-17

## 2018-07-17 PROBLEM — E66.9 OBESITY: Status: ACTIVE | Noted: 2018-07-17

## 2018-07-17 PROBLEM — I10 HTN (HYPERTENSION): Status: ACTIVE | Noted: 2018-07-17

## 2018-07-17 LAB
ALBUMIN SERPL-MCNC: 2.9 G/DL (ref 3.5–5)
ALBUMIN/GLOB SERPL: 0.6 {RATIO} (ref 1.1–2.2)
ALP SERPL-CCNC: 70 U/L (ref 45–117)
ALT SERPL-CCNC: 21 U/L (ref 12–78)
ANION GAP SERPL CALC-SCNC: 7 MMOL/L (ref 5–15)
AST SERPL-CCNC: 23 U/L (ref 15–37)
BASOPHILS # BLD: 0.1 K/UL (ref 0–0.1)
BASOPHILS NFR BLD: 1 % (ref 0–1)
BILIRUB SERPL-MCNC: 0.5 MG/DL (ref 0.2–1)
BUN SERPL-MCNC: 18 MG/DL (ref 6–20)
BUN/CREAT SERPL: 18 (ref 12–20)
CALCIUM SERPL-MCNC: 8.7 MG/DL (ref 8.5–10.1)
CHLORIDE SERPL-SCNC: 111 MMOL/L (ref 97–108)
CO2 SERPL-SCNC: 24 MMOL/L (ref 21–32)
CREAT SERPL-MCNC: 0.99 MG/DL (ref 0.55–1.02)
DIFFERENTIAL METHOD BLD: ABNORMAL
EOSINOPHIL # BLD: 0.3 K/UL (ref 0–0.4)
EOSINOPHIL NFR BLD: 3 % (ref 0–7)
ERYTHROCYTE [DISTWIDTH] IN BLOOD BY AUTOMATED COUNT: 15 % (ref 11.5–14.5)
GLOBULIN SER CALC-MCNC: 4.9 G/DL (ref 2–4)
GLUCOSE BLD STRIP.AUTO-MCNC: 146 MG/DL (ref 65–100)
GLUCOSE BLD STRIP.AUTO-MCNC: 203 MG/DL (ref 65–100)
GLUCOSE BLD STRIP.AUTO-MCNC: 222 MG/DL (ref 65–100)
GLUCOSE SERPL-MCNC: 107 MG/DL (ref 65–100)
HCT VFR BLD AUTO: 26.2 % (ref 35–47)
HGB BLD-MCNC: 7.9 G/DL (ref 11.5–16)
IMM GRANULOCYTES # BLD: 0 K/UL (ref 0–0.04)
IMM GRANULOCYTES NFR BLD AUTO: 1 % (ref 0–0.5)
LYMPHOCYTES # BLD: 2.1 K/UL (ref 0.8–3.5)
LYMPHOCYTES NFR BLD: 27 % (ref 12–49)
MAGNESIUM SERPL-MCNC: 1.6 MG/DL (ref 1.6–2.4)
MCH RBC QN AUTO: 29.7 PG (ref 26–34)
MCHC RBC AUTO-ENTMCNC: 30.2 G/DL (ref 30–36.5)
MCV RBC AUTO: 98.5 FL (ref 80–99)
MONOCYTES # BLD: 0.8 K/UL (ref 0–1)
MONOCYTES NFR BLD: 11 % (ref 5–13)
NEUTS SEG # BLD: 4.3 K/UL (ref 1.8–8)
NEUTS SEG NFR BLD: 57 % (ref 32–75)
NRBC # BLD: 0 K/UL (ref 0–0.01)
NRBC BLD-RTO: 0 PER 100 WBC
PLATELET # BLD AUTO: 164 K/UL (ref 150–400)
PMV BLD AUTO: 9.7 FL (ref 8.9–12.9)
POTASSIUM SERPL-SCNC: 4.1 MMOL/L (ref 3.5–5.1)
PROT SERPL-MCNC: 7.8 G/DL (ref 6.4–8.2)
RBC # BLD AUTO: 2.66 M/UL (ref 3.8–5.2)
SERVICE CMNT-IMP: ABNORMAL
SODIUM SERPL-SCNC: 142 MMOL/L (ref 136–145)
TROPONIN I SERPL-MCNC: <0.05 NG/ML
TROPONIN I SERPL-MCNC: <0.05 NG/ML
TSH SERPL DL<=0.05 MIU/L-ACNC: 1.34 UIU/ML (ref 0.36–3.74)
WBC # BLD AUTO: 7.6 K/UL (ref 3.6–11)

## 2018-07-17 PROCEDURE — 36415 COLL VENOUS BLD VENIPUNCTURE: CPT | Performed by: NURSE PRACTITIONER

## 2018-07-17 PROCEDURE — 85025 COMPLETE CBC W/AUTO DIFF WBC: CPT | Performed by: NURSE PRACTITIONER

## 2018-07-17 PROCEDURE — 94640 AIRWAY INHALATION TREATMENT: CPT

## 2018-07-17 PROCEDURE — 65390000012 HC CONDITION CODE 44 OBSERVATION

## 2018-07-17 PROCEDURE — 84484 ASSAY OF TROPONIN QUANT: CPT | Performed by: NURSE PRACTITIONER

## 2018-07-17 PROCEDURE — 96375 TX/PRO/DX INJ NEW DRUG ADDON: CPT

## 2018-07-17 PROCEDURE — 96361 HYDRATE IV INFUSION ADD-ON: CPT

## 2018-07-17 PROCEDURE — 96374 THER/PROPH/DIAG INJ IV PUSH: CPT

## 2018-07-17 PROCEDURE — 93306 TTE W/DOPPLER COMPLETE: CPT

## 2018-07-17 PROCEDURE — 74177 CT ABD & PELVIS W/CONTRAST: CPT

## 2018-07-17 PROCEDURE — 93041 RHYTHM ECG TRACING: CPT

## 2018-07-17 PROCEDURE — 74011636637 HC RX REV CODE- 636/637: Performed by: NURSE PRACTITIONER

## 2018-07-17 PROCEDURE — 71275 CT ANGIOGRAPHY CHEST: CPT

## 2018-07-17 PROCEDURE — 74011000250 HC RX REV CODE- 250: Performed by: NURSE PRACTITIONER

## 2018-07-17 PROCEDURE — 65410000002 HC RM PRIVATE OB

## 2018-07-17 PROCEDURE — 82962 GLUCOSE BLOOD TEST: CPT

## 2018-07-17 PROCEDURE — 80053 COMPREHEN METABOLIC PANEL: CPT | Performed by: NURSE PRACTITIONER

## 2018-07-17 PROCEDURE — 74011250637 HC RX REV CODE- 250/637: Performed by: NURSE PRACTITIONER

## 2018-07-17 PROCEDURE — 84443 ASSAY THYROID STIM HORMONE: CPT | Performed by: NURSE PRACTITIONER

## 2018-07-17 PROCEDURE — 83735 ASSAY OF MAGNESIUM: CPT | Performed by: NURSE PRACTITIONER

## 2018-07-17 PROCEDURE — 74011250636 HC RX REV CODE- 250/636: Performed by: NURSE PRACTITIONER

## 2018-07-17 PROCEDURE — 74011636320 HC RX REV CODE- 636/320: Performed by: OBSTETRICS & GYNECOLOGY

## 2018-07-17 PROCEDURE — 71046 X-RAY EXAM CHEST 2 VIEWS: CPT

## 2018-07-17 PROCEDURE — 96376 TX/PRO/DX INJ SAME DRUG ADON: CPT

## 2018-07-17 PROCEDURE — 74011000258 HC RX REV CODE- 258: Performed by: OBSTETRICS & GYNECOLOGY

## 2018-07-17 RX ORDER — ACETAMINOPHEN 325 MG/1
650 TABLET ORAL
Status: DISCONTINUED | OUTPATIENT
Start: 2018-07-17 | End: 2018-07-17

## 2018-07-17 RX ORDER — PANTOPRAZOLE SODIUM 40 MG/1
40 TABLET, DELAYED RELEASE ORAL DAILY
Status: DISCONTINUED | OUTPATIENT
Start: 2018-07-17 | End: 2018-07-19 | Stop reason: HOSPADM

## 2018-07-17 RX ORDER — ENOXAPARIN SODIUM 100 MG/ML
40 INJECTION SUBCUTANEOUS EVERY 24 HOURS
Status: DISCONTINUED | OUTPATIENT
Start: 2018-07-18 | End: 2018-07-17

## 2018-07-17 RX ORDER — DEXTROSE 50 % IN WATER (D50W) INTRAVENOUS SYRINGE
12.5-25 AS NEEDED
Status: DISCONTINUED | OUTPATIENT
Start: 2018-07-17 | End: 2018-07-19 | Stop reason: HOSPADM

## 2018-07-17 RX ORDER — CLONAZEPAM 0.5 MG/1
0.5 TABLET ORAL
Status: DISCONTINUED | OUTPATIENT
Start: 2018-07-17 | End: 2018-07-19 | Stop reason: HOSPADM

## 2018-07-17 RX ORDER — POLYVINYL ALCOHOL 14 MG/ML
1-2 SOLUTION/ DROPS OPHTHALMIC
Status: DISCONTINUED | OUTPATIENT
Start: 2018-07-17 | End: 2018-07-19 | Stop reason: HOSPADM

## 2018-07-17 RX ORDER — ARFORMOTEROL TARTRATE 15 UG/2ML
15 SOLUTION RESPIRATORY (INHALATION)
Status: DISCONTINUED | OUTPATIENT
Start: 2018-07-17 | End: 2018-07-19 | Stop reason: HOSPADM

## 2018-07-17 RX ORDER — MONTELUKAST SODIUM 10 MG/1
10 TABLET ORAL
Status: DISCONTINUED | OUTPATIENT
Start: 2018-07-17 | End: 2018-07-19 | Stop reason: HOSPADM

## 2018-07-17 RX ORDER — LEVOTHYROXINE SODIUM 75 UG/1
150 TABLET ORAL
Status: DISCONTINUED | OUTPATIENT
Start: 2018-07-17 | End: 2018-07-19 | Stop reason: HOSPADM

## 2018-07-17 RX ORDER — ENOXAPARIN SODIUM 100 MG/ML
40 INJECTION SUBCUTANEOUS EVERY 24 HOURS
Status: DISCONTINUED | OUTPATIENT
Start: 2018-07-18 | End: 2018-07-19 | Stop reason: HOSPADM

## 2018-07-17 RX ORDER — DIPHENHYDRAMINE HYDROCHLORIDE 50 MG/ML
50 INJECTION, SOLUTION INTRAMUSCULAR; INTRAVENOUS ONCE
Status: COMPLETED | OUTPATIENT
Start: 2018-07-17 | End: 2018-07-17

## 2018-07-17 RX ORDER — NALOXONE HYDROCHLORIDE 0.4 MG/ML
0.4 INJECTION, SOLUTION INTRAMUSCULAR; INTRAVENOUS; SUBCUTANEOUS AS NEEDED
Status: DISCONTINUED | OUTPATIENT
Start: 2018-07-17 | End: 2018-07-19 | Stop reason: HOSPADM

## 2018-07-17 RX ORDER — DIPHENHYDRAMINE HYDROCHLORIDE 50 MG/ML
12.5 INJECTION, SOLUTION INTRAMUSCULAR; INTRAVENOUS
Status: DISCONTINUED | OUTPATIENT
Start: 2018-07-17 | End: 2018-07-19 | Stop reason: HOSPADM

## 2018-07-17 RX ORDER — LORATADINE 10 MG/1
10 TABLET ORAL DAILY
Status: DISCONTINUED | OUTPATIENT
Start: 2018-07-17 | End: 2018-07-18

## 2018-07-17 RX ORDER — SERTRALINE HYDROCHLORIDE 50 MG/1
50 TABLET, FILM COATED ORAL DAILY
Status: DISCONTINUED | OUTPATIENT
Start: 2018-07-17 | End: 2018-07-19 | Stop reason: HOSPADM

## 2018-07-17 RX ORDER — MORPHINE SULFATE 2 MG/ML
2 INJECTION, SOLUTION INTRAMUSCULAR; INTRAVENOUS
Status: DISCONTINUED | OUTPATIENT
Start: 2018-07-17 | End: 2018-07-19 | Stop reason: HOSPADM

## 2018-07-17 RX ORDER — FAMOTIDINE 20 MG/1
20 TABLET, FILM COATED ORAL DAILY
Status: DISCONTINUED | OUTPATIENT
Start: 2018-07-17 | End: 2018-07-19 | Stop reason: HOSPADM

## 2018-07-17 RX ORDER — DOCUSATE SODIUM 100 MG/1
100 CAPSULE, LIQUID FILLED ORAL DAILY
Status: DISCONTINUED | OUTPATIENT
Start: 2018-07-17 | End: 2018-07-19 | Stop reason: HOSPADM

## 2018-07-17 RX ORDER — SODIUM CHLORIDE 0.9 % (FLUSH) 0.9 %
5-10 SYRINGE (ML) INJECTION AS NEEDED
Status: DISCONTINUED | OUTPATIENT
Start: 2018-07-17 | End: 2018-07-19 | Stop reason: HOSPADM

## 2018-07-17 RX ORDER — INSULIN LISPRO 100 [IU]/ML
INJECTION, SOLUTION INTRAVENOUS; SUBCUTANEOUS
Status: DISCONTINUED | OUTPATIENT
Start: 2018-07-17 | End: 2018-07-17

## 2018-07-17 RX ORDER — LOSARTAN POTASSIUM 50 MG/1
50 TABLET ORAL DAILY
Status: DISCONTINUED | OUTPATIENT
Start: 2018-07-17 | End: 2018-07-17

## 2018-07-17 RX ORDER — INSULIN LISPRO 100 [IU]/ML
INJECTION, SOLUTION INTRAVENOUS; SUBCUTANEOUS
Status: DISCONTINUED | OUTPATIENT
Start: 2018-07-17 | End: 2018-07-19 | Stop reason: HOSPADM

## 2018-07-17 RX ORDER — ALBUTEROL SULFATE 90 UG/1
2 AEROSOL, METERED RESPIRATORY (INHALATION)
Status: DISCONTINUED | OUTPATIENT
Start: 2018-07-17 | End: 2018-07-17 | Stop reason: SDUPTHER

## 2018-07-17 RX ORDER — MAGNESIUM SULFATE 100 %
4 CRYSTALS MISCELLANEOUS AS NEEDED
Status: DISCONTINUED | OUTPATIENT
Start: 2018-07-17 | End: 2018-07-19 | Stop reason: HOSPADM

## 2018-07-17 RX ORDER — SODIUM CHLORIDE 9 MG/ML
25 INJECTION, SOLUTION INTRAVENOUS CONTINUOUS
Status: DISCONTINUED | OUTPATIENT
Start: 2018-07-17 | End: 2018-07-19 | Stop reason: HOSPADM

## 2018-07-17 RX ORDER — HYDROCODONE BITARTRATE AND ACETAMINOPHEN 5; 325 MG/1; MG/1
1 TABLET ORAL
Status: DISCONTINUED | OUTPATIENT
Start: 2018-07-17 | End: 2018-07-19 | Stop reason: HOSPADM

## 2018-07-17 RX ORDER — ALBUTEROL SULFATE 0.83 MG/ML
2.5 SOLUTION RESPIRATORY (INHALATION)
Status: DISCONTINUED | OUTPATIENT
Start: 2018-07-17 | End: 2018-07-19 | Stop reason: HOSPADM

## 2018-07-17 RX ORDER — FLUTICASONE PROPIONATE 50 MCG
2 SPRAY, SUSPENSION (ML) NASAL DAILY
Status: DISCONTINUED | OUTPATIENT
Start: 2018-07-17 | End: 2018-07-19 | Stop reason: HOSPADM

## 2018-07-17 RX ORDER — ENOXAPARIN SODIUM 100 MG/ML
40 INJECTION SUBCUTANEOUS EVERY 24 HOURS
Status: DISCONTINUED | OUTPATIENT
Start: 2018-07-17 | End: 2018-07-17

## 2018-07-17 RX ORDER — SODIUM CHLORIDE 0.9 % (FLUSH) 0.9 %
5-10 SYRINGE (ML) INJECTION EVERY 8 HOURS
Status: DISCONTINUED | OUTPATIENT
Start: 2018-07-17 | End: 2018-07-19 | Stop reason: HOSPADM

## 2018-07-17 RX ORDER — SODIUM CHLORIDE 0.9 % (FLUSH) 0.9 %
10 SYRINGE (ML) INJECTION
Status: COMPLETED | OUTPATIENT
Start: 2018-07-17 | End: 2018-07-17

## 2018-07-17 RX ORDER — VALSARTAN 80 MG/1
80 TABLET ORAL DAILY
Status: DISCONTINUED | OUTPATIENT
Start: 2018-07-18 | End: 2018-07-19 | Stop reason: HOSPADM

## 2018-07-17 RX ORDER — ONDANSETRON 2 MG/ML
4 INJECTION INTRAMUSCULAR; INTRAVENOUS
Status: DISCONTINUED | OUTPATIENT
Start: 2018-07-17 | End: 2018-07-19 | Stop reason: HOSPADM

## 2018-07-17 RX ORDER — BUDESONIDE 0.5 MG/2ML
500 INHALANT ORAL
Status: DISCONTINUED | OUTPATIENT
Start: 2018-07-17 | End: 2018-07-19 | Stop reason: HOSPADM

## 2018-07-17 RX ADMIN — METHYLPREDNISOLONE SODIUM SUCCINATE 40 MG: 40 INJECTION, POWDER, FOR SOLUTION INTRAMUSCULAR; INTRAVENOUS at 16:48

## 2018-07-17 RX ADMIN — SODIUM CHLORIDE 100 ML: 900 INJECTION, SOLUTION INTRAVENOUS at 16:04

## 2018-07-17 RX ADMIN — MONTELUKAST SODIUM 10 MG: 10 TABLET, FILM COATED ORAL at 21:37

## 2018-07-17 RX ADMIN — ARFORMOTEROL TARTRATE 15 MCG: 15 SOLUTION RESPIRATORY (INHALATION) at 22:42

## 2018-07-17 RX ADMIN — IOPAMIDOL 100 ML: 755 INJECTION, SOLUTION INTRAVENOUS at 16:04

## 2018-07-17 RX ADMIN — PANTOPRAZOLE SODIUM 40 MG: 40 TABLET, DELAYED RELEASE ORAL at 10:46

## 2018-07-17 RX ADMIN — INSULIN LISPRO 3 UNITS: 100 INJECTION, SOLUTION INTRAVENOUS; SUBCUTANEOUS at 12:17

## 2018-07-17 RX ADMIN — LORATADINE 10 MG: 10 TABLET ORAL at 10:46

## 2018-07-17 RX ADMIN — BUDESONIDE 500 MCG: 0.5 INHALANT RESPIRATORY (INHALATION) at 22:42

## 2018-07-17 RX ADMIN — FAMOTIDINE 20 MG: 20 TABLET ORAL at 10:46

## 2018-07-17 RX ADMIN — SODIUM CHLORIDE 125 ML/HR: 900 INJECTION, SOLUTION INTRAVENOUS at 20:43

## 2018-07-17 RX ADMIN — SODIUM CHLORIDE 125 ML/HR: 900 INJECTION, SOLUTION INTRAVENOUS at 11:10

## 2018-07-17 RX ADMIN — LOSARTAN POTASSIUM 50 MG: 50 TABLET ORAL at 10:46

## 2018-07-17 RX ADMIN — INSULIN LISPRO 2 UNITS: 100 INJECTION, SOLUTION INTRAVENOUS; SUBCUTANEOUS at 22:20

## 2018-07-17 RX ADMIN — DIPHENHYDRAMINE HYDROCHLORIDE 50 MG: 50 INJECTION, SOLUTION INTRAMUSCULAR; INTRAVENOUS at 08:50

## 2018-07-17 RX ADMIN — Medication 10 ML: at 21:37

## 2018-07-17 RX ADMIN — LEVOTHYROXINE SODIUM 150 MCG: 75 TABLET ORAL at 10:46

## 2018-07-17 RX ADMIN — DOCUSATE SODIUM 100 MG: 100 CAPSULE, LIQUID FILLED ORAL at 10:46

## 2018-07-17 RX ADMIN — Medication 10 ML: at 10:49

## 2018-07-17 RX ADMIN — FLUTICASONE PROPIONATE 2 SPRAY: 50 SPRAY, METERED NASAL at 10:48

## 2018-07-17 RX ADMIN — INSULIN LISPRO 4 UNITS: 100 INJECTION, SOLUTION INTRAVENOUS; SUBCUTANEOUS at 16:48

## 2018-07-17 RX ADMIN — METHYLPREDNISOLONE SODIUM SUCCINATE 40 MG: 40 INJECTION, POWDER, FOR SOLUTION INTRAMUSCULAR; INTRAVENOUS at 12:17

## 2018-07-17 RX ADMIN — METHYLPREDNISOLONE SODIUM SUCCINATE 40 MG: 40 INJECTION, POWDER, FOR SOLUTION INTRAMUSCULAR; INTRAVENOUS at 08:42

## 2018-07-17 RX ADMIN — DIPHENHYDRAMINE HYDROCHLORIDE 50 MG: 50 INJECTION, SOLUTION INTRAMUSCULAR; INTRAVENOUS at 13:16

## 2018-07-17 RX ADMIN — IOHEXOL 50 ML: 240 INJECTION, SOLUTION INTRATHECAL; INTRAVASCULAR; INTRAVENOUS; ORAL at 16:30

## 2018-07-17 RX ADMIN — SERTRALINE HYDROCHLORIDE 50 MG: 50 TABLET ORAL at 10:46

## 2018-07-17 RX ADMIN — Medication 10 ML: at 16:04

## 2018-07-17 NOTE — IP AVS SNAPSHOT
2700 [facility] [address] [place] [address] 
[phone] Patient: Nereyda Donis MRN: [id number] :1960 About your hospitalization You were admitted on:  2018 You last received care in the:  1200 [facility] You were discharged on:  2018 Why you were hospitalized Your primary diagnosis was:  Not on File Your diagnoses also included:  Ovarian Cancer (Hcc), Diabetes (Hcc), Sob (Shortness Of Breath), Type 2 Diabetes Mellitus With Nephropathy (Hcc), Angina Pectoris Associated With Type 2 Diabetes Mellitus (Hcc), Benign Essential Hypertension, Carcinomatosis (Hcc), Chest Mass, Chest Pain, Atypical, Chest Wall Mass, Contrast Media Allergy, Acquired Hypothyroidism, Dyslipidemia (High Ldl; Low Hdl), Controlled Type 2 Diabetes Mellitus Without Complication, With Long-Term Current Use Of Insulin (Hcc), Lymphadenopathy, Mediastinal, On Antineoplastic Chemotherapy, Pulmonary Hypertension, Mild (Hcc), Reactive Depression, Sleep Apnea, Stage 1 Chronic Kidney Disease, Morbid Obesity (Hcc), Anemia, Hypertension, Hyperglycemia Due To Type 2 Diabetes Mellitus (Hcc), Pain Due To Malignant Neoplasm Metastatic To Bone (Hcc) Follow-up Information Follow up With Details Comments Contact Info Remi Stevens MD   [facility] [address] [employer] 83. [phone] Brayden Connolly MD  call office today to make an appointment to see DR lopez in 3-5 days. [address] [place] [address] Professor Ramirez [facility] [address] [place] [address] 
[phone] Remi Stevens MD  Call office to make an appointment to see PCP in 3- 5 days. [facility] [address] [facility] [address] 
[phone] Your Scheduled Appointments 2018  2:30 PM EDT CHEMOTHERAPY with Brayden Connolly MD  
[facility] Gynecologic Oncology 365[facility]) [address] [place] [address] [facility] G-7 [phone]  
[phone] Discharge Orders None A check chikis indicates which time of day the medication should be taken. My Medications START taking these medications Instructions Each Dose to Equal  
 Morning Noon Evening Bedtime  
 cromolyn 5.2 mg/spray (4 %) nasal spray Commonly known as:  Senthil Scruggs Your last dose was: Your next dose is:    
   
   
 1 Spray by Both Nostrils route four (4) times daily. 1 Spray  
    
   
   
   
  
 fexofenadine-pseudoephedrine 180-240 mg per tablet Commonly known as:  ALLEGRA-D 24 Your last dose was: Your next dose is: Take 1 Tab by mouth daily for 4 days. 1 Tab  
    
   
   
   
  
 guaiFENesin-codeine 100-10 mg/5 mL solution Commonly known as:  ROBITUSSIN AC Your last dose was: Your next dose is: Take 10 mL by mouth four (4) times daily as needed for Cough or Congestion. Max Daily Amount: 40 mL. 10 mL  
    
   
   
   
  
 trimethoprim-polymyxin b ophthalmic solution Commonly known as:  POLYTRIM Your last dose was: Your next dose is:    
   
   
 Administer 1 Drop to right eye every four (4) hours for 7 days. Indications: BACTERIAL CONJUNCTIVITIS  
 1 Drop CONTINUE taking these medications Instructions Each Dose to Equal  
 Morning Noon Evening Bedtime  
 albuterol 2.5 mg /3 mL (0.083 %) nebulizer solution Commonly known as:  PROVENTIL VENTOLIN Your last dose was: Your next dose is:    
   
   
 USE 1 VAIL VIA NEBULIZER EVERY 4 HOURS AS NEEDED FOR WHEEZING  
     
   
   
   
  
 cholecalciferol (VITAMIN D3) 5,000 unit Tab tablet Commonly known as:  VITAMIN D3 Your last dose was: Your next dose is: Take 5,000 Units by mouth daily. 5000 Units  
    
   
   
   
  
 clonazePAM 0.5 mg tablet Commonly known as:  Betsy Moore  
   
 Your last dose was: Your next dose is: Take 0.5 mg by mouth nightly as needed. 0.5 mg Dexlansoprazole 60 mg Cpdb Commonly known as:  DEXILANT Your last dose was: Your next dose is: Take 1 Cap by mouth daily. 60 mg  
    
   
   
   
  
 fluticasone 50 mcg/actuation nasal spray Commonly known as:  Tong Eatammie Your last dose was: Your next dose is: 2 Sprays by Both Nostrils route daily as needed. 2 Spray  
    
   
   
   
  
 furosemide 40 mg tablet Commonly known as:  LASIX Your last dose was: Your next dose is: Take 40 mg by mouth daily as needed (edema; do not exceed 2-3 doses per week). 40 mg HYDROcodone-acetaminophen 5-325 mg per tablet Commonly known as:  John Tomlinson Your last dose was: Your next dose is: Take 1 Tab by mouth every four (4) hours as needed for Pain. Max Daily Amount: 6 Tabs. 1 Tab  
    
   
   
   
  
 insulin aspart U-100 100 unit/mL Inpn Commonly known as:  NovoLOG Flexpen U-100 Insulin Your last dose was: Your next dose is: INJECT 20 UNITS BEFORE EACH MEAL + 4 UNITS FOR EVERY 50 MG/DL ABOVE 150 MG/DL.  UNITS PER DAY  
     
   
   
   
  
 insulin detemir U-100 100 unit/mL (3 mL) Inpn Commonly known as:  LEVEMIR FLEXTOUCH U-100 INSULN Your last dose was: Your next dose is: INJECT 30 UNITS IN AM AND 50 UNITS AT NIGHT. INCREASE AS DIRECTED TO  UNITS PER DAY--CALL [phone] FOR APPOINTMENT FOR MORE REFILLS  
     
   
   
   
  
 levothyroxine 150 mcg tablet Commonly known as:  SYNTHROID Your last dose was: Your next dose is: TAKE 1 TAB BY MOUTH DAILY (BEFORE BREAKFAST). --XAPK [phone] FOR APPOINTMENT FOR MORE REFILLS  
     
   
   
   
  
 lidocaine-prilocaine topical cream  
Commonly known as:  EMLA Your last dose was: Your next dose is:    
   
   
 Apply small amount over port area one hour before chemo treatment and cover with a Band-Aid LINZESS 145 mcg Cap capsule Generic drug:  linaclotide Your last dose was: Your next dose is: TAKE 1 CAPSULE BY MOUTH EVERY DAY Liraglutide 0.6 mg/0.1 mL (18 mg/3 mL) Pnij Commonly known as:  Natalie Sherman Your last dose was: Your next dose is:    
   
   
 1.8 mg by SubCUTAneous route daily (with lunch). 1.8 mg  
    
   
   
   
  
 montelukast 10 mg tablet Commonly known as:  SINGULAIR Your last dose was: Your next dose is: TAKE 1 TAB BY MOUTH DAILY. omega-3 acid ethyl esters 1 gram capsule Commonly known as:  Anthonette Rolling Your last dose was: Your next dose is: TAKE ONE CAPSULE BY MOUTH TWICE A DAY  
     
   
   
   
  
 ondansetron 8 mg disintegrating tablet Commonly known as:  ZOFRAN ODT Your last dose was: Your next dose is: Take 1 Tab by mouth every eight (8) hours as needed for Nausea. 8 mg  
    
   
   
   
  
 potassium chloride 10 mEq tablet Commonly known as:  KLOR-CON M10 Your last dose was: Your next dose is: TAKE 1 TAB BY MOUTH TWO (2) DAYS A WEEK. WED AND FRI  
     
   
   
   
  
 sertraline 50 mg tablet Commonly known as:  ZOLOFT Your last dose was: Your next dose is: TAKE 1 TABLET BY MOUTH EVERY MORNING  
     
   
   
   
  
 SYMBICORT 160-4.5 mcg/actuation Hfaa Generic drug:  budesonide-formoterol Your last dose was: Your next dose is: Take 2 Puffs by inhalation two (2) times daily as needed. 2 Puff  
    
   
   
   
  
 valsartan 80 mg tablet Commonly known as:  DIOVAN Your last dose was: Your next dose is: TAKE 1 TAB BY MOUTH DAILY. STOP taking these medications ALLEGRA 180 mg tablet Generic drug:  fexofenadine Where to Get Your Medications Information on where to get these meds will be given to you by the nurse or doctor. ! Ask your nurse or doctor about these medications  
  cromolyn 5.2 mg/spray (4 %) nasal spray  
 fexofenadine-pseudoephedrine 180-240 mg per tablet  
 guaiFENesin-codeine 100-10 mg/5 mL solution  
 trimethoprim-polymyxin b ophthalmic solution Opioid Education Prescription Opioids: What You Need to Know: 
 
Prescription opioids can be used to help relieve moderate-to-severe pain and are often prescribed following a surgery or injury, or for certain health conditions. These medications can be an important part of treatment but also come with serious risks. Opioids are strong pain medicines. Examples include hydrocodone, oxycodone, fentanyl, and morphine. Heroin is an example of an illegal opioid. It is important to work with your health care provider to make sure you are getting the safest, most effective care. WHAT ARE THE RISKS AND SIDE EFFECTS OF OPIOID USE? Prescription opioids carry serious risks of addiction and overdose, especially with prolonged use. An opioid overdose, often marked by slow breathing, can cause sudden death. The use of prescription opioids can have a number of side effects as well, even when taken as directed. · Tolerance-meaning you might need to take more of a medication for the same pain relief · Physical dependence-meaning you have symptoms of withdrawal when the medication is stopped. Withdrawal symptoms can include nausea, sweating, chills, diarrhea, stomach cramps, and muscle aches. Withdrawal can last up to several weeks, depending on which drug you took and how long you took it. · Increased sensitivity to pain · Constipation · Nausea, vomiting, and dry mouth · Sleepiness and dizziness · Confusion · Depression · Low levels of testosterone that can result in lower sex drive, energy, and strength · Itching and sweating RISKS ARE GREATER WITH:      
· History of drug misuse, substance use disorder, or overdose · Mental health conditions (such as depression or anxiety) · Sleep apnea · Older age (72 years or older) · Pregnancy Avoid alcohol while taking prescription opioids. Also, unless specifically advised by your health care provider, medications to avoid include: · Benzodiazepines (such as Xanax or Valium) · Muscle relaxants (such as Soma or Flexeril) · Hypnotics (such as Ambien or Lunesta) · Other prescription opioids KNOW YOUR OPTIONS Talk to your health care provider about ways to manage your pain that don't involve prescription opioids. Some of these options may actually work better and have fewer risks and side effects. Options may include: 
· Pain relievers such as acetaminophen, ibuprofen, and naproxen · Some medications that are also used for depression or seizures · Physical therapy and exercise · Counseling to help patients learn how to cope better with triggers of pain and stress. · Application of heat or cold compress · Massage therapy · Relaxation techniques Be Informed Make sure you know the name of your medication, how much and how often to take it, and its potential risks & side effects. IF YOU ARE PRESCRIBED OPIOIDS FOR PAIN: 
· Never take opioids in greater amounts or more often than prescribed. Remember the goal is not to be pain-free but to manage your pain at a tolerable level. · Follow up with your primary care provider to: · Work together to create a plan on how to manage your pain. · Talk about ways to help manage your pain that don't involve prescription opioids. · Talk about any and all concerns and side effects. · Help prevent misuse and abuse. · Never sell or share prescription opioids · Help prevent misuse and abuse. · Store prescription opioids in a secure place and out of reach of others (this may include visitors, children, friends, and family). · Safely dispose of unused/unwanted prescription opioids: Find your community drug take-back program or your pharmacy mail-back program, or flush them down the toilet, following guidance from the Food and Drug Administration (www.fda.gov/Drugs/ResourcesForYou). · Visit www.cdc.gov/drugoverdose to learn about the risks of opioid abuse and overdose. · If you believe you may be struggling with addiction, tell your health care provider and ask for guidance or call [facility] at [phone]. Discharge Instructions [address], [address] [facility], [address] 
P [phone]  F [phone] YOUR DISCHARGE INSTRUCTIONS Cathleen Blake, Please review your instructions with your nurse and ask any questions so you have all the information you need to recover well at home. If you do not feel you have everything you need to succeed and be safe after you leave the hospital, please discuss these concerns with your nurse. As always, call for any questions at home. Your doctor: Carole Hargrove MD 
Diagnosis:  
Chest pain Ovarian cancer ([facility] [address] SOB (shortness of breath) Diabetes ([facility] [address] HTN (hypertension) Cough, conjunctivitis Date of Discharge: 7/19/2018 Take Home Medications See Discharge Medication Review provided to you by your nurse. If you did not receive one, request this prior to your discharge. · It is important that you take your medications as they are prescribed. · Keep your medications in the bottles provided by the pharmacist and keep a list of the medication names, dosages, times to be taken and what they are for in your wallet. · Do not take other medications without consulting your doctor. · You should take a daily gentle stool softener such as a colace pill or dulcolax suppository for constipation as this is not uncommon after surgery and/or while on pain medication. If not prescribed, this can be found over the counter. If constipation persists for >24 hours you should take a dose of Milk of Magnesia. Call if your constipation continues. · Continue cough medications, avoid allergens. While taking Allegra-D, do not take your home medication of allegra. You may resume after completion of therapy. · Continue right eye drops and monitor for resolution of your conjunctival symptoms. Continue cool compresses daily Diet · Stay hydrated and drink fluids such as gatorade and water. This will also help prevent constipation and dehydration. Limit somewhat any usual caffeine intake of beverages such as soda, tea and coffee as this may serve to dehydrate you. · Consider a nutritional supplement at home if you have a reduced appetite or experience unintended weight loss. · If nauseated, keep your diet limited to liquids and call if this persists. Activity · If possible, have someone with you at all times until you feel stable. · Gradually increase your activity each day. There are generally no restrictions on walking, climbing stairs or riding in a car. Try to walk at least 4 times per day. Causes For Concern If any of the following occur, please call our office and speak with the Nurse/aid who will help you with your problem or ask the doctor to call you. Problems with the incision, including increasing pain, swelling, redness or drainage. Increasing abdominal pain despite medication Persisting nausea or vomiting. Fever or chills and a temperature >101F Constipation (no bowel movement for three days). Diarrhea (more than three watery stools within 24 hours). Excessive vaginal or wound bleeding. If after hours and you cannot reach an on-call physician, call 911. Follow-Up Keep your appt with Dr. Chantale Matute as scheduled. Follow-up with your PCP's office, Florencia Meade MD in 5 days or sooner to evaluate your upper respiratory symptoms, allergies or sooner if they fail to resolve or worsen. Information obtained by : 
I understand that if any problems occur once I am at home I am to contact my physician. I understand and acknowledge receipt of the instructions indicated above. Physician's or R.N.'s Signature                                                                  Date/Time Patient or Representative Signature                                                          Date/Time MyChart Activation Thank you for requesting access to O4IT. Please follow the instructions below to securely access and download your online medical record. O4IT allows you to send messages to your doctor, view your test results, renew your prescriptions, schedule appointments, and more. How Do I Sign Up? 1. In your internet browser, go to [email or web address] 
2. Click on the First Time User? Click Here link in the Sign In box. You will be redirect to the New Member Sign Up page. 3. Enter your O4IT Access Code exactly as it appears below. You will not need to use this code after youve completed the sign-up process. If you do not sign up before the expiration date, you must request a new code. O4IT Access Code: Activation code not generated Current O4IT Status: Active (This is the date your O4IT access code will ) 4.  Enter the last four digits of your Social Security Number (xxxx) and Date of Birth (mm/dd/yyyy) as indicated and click Submit. You will be taken to the next sign-up page. 5. Create a Planet Sushi ID. This will be your Planet Sushi login ID and cannot be changed, so think of one that is secure and easy to remember. 6. Create a [employer]riest password. You can change your password at any time. 7. Enter your Password Reset Question and Answer. This can be used at a later time if you forget your password. 8. Enter your e-mail address. You will receive e-mail notification when new information is available in [address] 9. Click Sign Up. You can now view and download portions of your medical record. 10. Click the Download Summary menu link to download a portable copy of your medical information. Additional Information If you have questions, please visit the Frequently Asked Questions section of the Planet Sushi website at [email or web address] Remember, Planet Sushi is NOT to be used for urgent needs. For medical emergencies, dial 911. Planet Sushi Announcement We are excited to announce that we are making your provider's discharge notes available to you in Planet Sushi. You will see these notes when they are completed and signed by the physician that discharged you from your recent hospital stay. If you have any questions or concerns about any information you see in Planet Sushi, please call the Health Information Department where you were seen or reach out to your Primary Care Provider for more information about your plan of care. Introducing [facility]! Dear Zoila Leventhal: Thank you for requesting a Planet Sushi account. Our records indicate that you already have an active Planet Sushi account. You can access your account anytime at [email or web address] Did you know that you can access your hospital and ER discharge instructions at any time in Planet Sushi? You can also review all of your test results from your hospital stay or ER visit. Additional Information If you have questions, please visit the Frequently Asked Questions section of the MyChart website at [email or web address] Remember, Kwarterhart is NOT to be used for urgent needs. For medical emergencies, dial 911. Now available from your iPhone and Android! Introducing Chau Evans As a [employer] patient, I wanted to make you aware of our electronic visit tool called Chau Evans. [employer] 24/7 allows you to connect within minutes with a medical provider 24 hours a day, seven days a week via a mobile device or tablet or logging into a secure website from your computer. You can access Chau Evans from anywhere in the [place]. A virtual visit might be right for you when you have a simple condition and feel like you just dont want to get out of bed, or cant get away from work for an appointment, when your regular [employer] provider is not available (evenings, weekends or holidays), or when youre out of town and need minor care. Electronic visits cost only $49 and if the [employer] 24/7 provider determines a prescription is needed to treat your condition, one can be electronically transmitted to a nearby pharmacy*. Please take a moment to enroll today if you have not already done so. The enrollment process is free and takes just a few minutes. To enroll, please download the [employer] 24/7 pat to your tablet or phone, or visit [email or web address] to enroll on your computer. And, as an [address] [place] [address] patient with a Intellisense account, the results of your visits will be scanned into your electronic medical record and your primary care provider will be able to view the scanned results. We urge you to continue to see your regular [employer] provider for your ongoing medical care.   And while your primary care provider may not be the one available when you seek a OmniPVchandrafin virtual visit, the peace of mind you get from getting a real diagnosis real time can be priceless. For more information on WEEZEVENT, view our Frequently Asked Questions (FAQs) at [email or web address]. Sincerely, 
 
Delfina Tracy MD 
Chief Medical Officer Nettie Cr *:  certain medications cannot be prescribed via WEEZEVENT Providers Seen During Your Hospitalization Provider Specialty Primary office phone Issac Morocho MD Gynecologic Oncology [phone] Your Primary Care Physician (PCP) Primary Care Physician Office Phone Office Fax Jed Rajesh [phone] You are allergic to the following Allergen Reactions Iodinated Contrast- Oral And Iv Dye Rash 13 hr pre-medication prior to IV contrast  
    
 Carboplatin Other (comments) Patient developed shortness of breath, felt faint, coughing, skin flushed and \"itchy\". This occurred on the patients 8th treatment. Lipitor (Atorvastatin) Myalgia Percocet (Oxycodone-Acetaminophen) Other (comments) fever Shellfish Containing Products Hives Tape (Adhesive) Itching Other (comments) \"op site-- clear,thin tape\"--caused blister Tomato Hives Recent Documentation Weight BMI OB Status Smoking Status 138.7 kg 45.29 kg/m2 Pregnant Never Smoker Emergency Contacts Name Discharge Info Relation Home Work Mobile [employer] [facility] CAREGIVER [3] Son [22] [phone] Patient Belongings The following personal items are in your possession at time of discharge: 
  Dental Appliances: None  Visual Aid: Glasses, With patient      Home Medications: None   Jewelry: None  Clothing: At bedside    Other Valuables: At bedside, Cell Phone, With patient Please provide this summary of care documentation to your next provider. Signatures-by signing, you are acknowledging that this After Visit Summary has been reviewed with you and you have received a copy. Patient Signature:  ____________________________________________________________ Date:  ____________________________________________________________  
  
Diane Orono Provider Signature:  ____________________________________________________________ Date:  ____________________________________________________________

## 2018-07-17 NOTE — PROGRESS NOTES
TRANSFER - OUT REPORT:    Verbal report given to [facility][1] RN(name) on Cathleen Blake  being transferred to (unit) for routine progression of care       Report consisted of patients Situation, Background, Assessment and   Recommendations(SBAR). Information from the following report(s) SBAR, Intake/Output, MAR, Recent Results and Cardiac Rhythm NSR was reviewed with the receiving nurse. Lines:   Venous Access Device ANGIODYNAMICS SMART PORT 05/04/18 Upper chest (subclavicular area), left (Active)   Central Line Being Utilized Yes 7/17/2018  4:28 PM   Criteria for Appropriate Use Limited/no vessel suitable for conventional peripheral access 7/17/2018  4:28 PM   Site Assessment Clean, dry, & intact 7/17/2018  4:28 PM   Date of Last Dressing Change 07/17/18 7/17/2018  4:28 PM   Dressing Status Clean, dry, & intact 7/17/2018  4:28 PM   Dressing Type Disk with Chlorhexadine gluconate (CHG); Transparent 7/17/2018  4:28 PM   Action Taken Open ports on tubing capped 7/17/2018  4:28 PM   Access Needle Size (Site #1) 20 G 7/17/2018  4:28 PM   Positive Blood Return (Medial Site) Yes 7/17/2018  4:28 PM   Alcohol Cap Used Yes 7/17/2018  4:28 PM        Opportunity for questions and clarification was provided.       Patient transported with:   [employer]

## 2018-07-17 NOTE — CONSULTS
Cardiology Consult Note      Patient Name: Cielo Hills  : 1960 MRN: [id number]  Date: 2018  Time: 10:05 AM    Admit Diagnosis: Chest Pain  Chest pain  Ovarian cancer ([address]  SOB (shortness of breath)  Diabetes ([address]  HTN (hypertension)  Obesity    Primary Cardiologist: Dr. Carley Sandoval Cardiologist: Benson Decree, M.D. Lovella Lombard for Consult: CP    Requesting MD: Anand Summers NP    HPI:  Cielo Hills is a 65 y.o. female admitted on 2018  for Chest Pain  Chest pain  Ovarian cancer ([address]  SOB (shortness of breath)  Diabetes ([address]  HTN (hypertension)  Obesity. has a past medical history of Adverse effect of anesthesia; Anemia; Arthritis; Asthma; BRCA negative (2013); Calculus of kidney; Chronic kidney disease; Chronic pain; CINV (chemotherapy-induced nausea and vomiting) (2014); Decreased hearing, right; Diabetes ([address] (Age 46); Environmental allergies; Fibromyalgia; GERD (gastroesophageal reflux disease); Hypertension; Ill-defined condition; Ill-defined condition (); Mass of chest wall (2018); Morbid obesity ([address] Murmur, heart; Other and unspecified hyperlipidemia; Ovarian cancer ([address] (2012, 2014); Psychiatric disorder; Rectal bleeding; Thromboembolus ([address] (); Thyroid disease; and Unspecified sleep apnea. Admitted for further management of chest pain related to invading tumor into sternum. Received all but last dose of chemotherapy for this. H/o prior Nuclear stress test in 2017, which was negative for ischemia. Managed by Dr. Caresse Severin for HLD, HTN,  She notes her chest pain is both pleuritic and positional.  It is also TTP. Current admission labs are pending. Cardiology Attending:Patient seen and examined. I agree with NP assessment and plans. Current symptoms do not suggest CAD, await echo.     Tho Torres MD 2018 11:02 AM       Subjective:  CP with inspiration, movement and palpation. Assessment and Plan     1. Chest pain - atypical   - Pleuritic, positional and palpable   - Will check echo and EKG   - Will follow up on Troponin   - History notes CAD, but see no identifying testing (No cath or CTA)  2. Chest wall mass   - Invading into sternum   - TTP   - Per Gyn/Onc  3. HTN   - Resume PTA meds  4. HLD   - Resume PTA meds  5. DM   - Recommend SSI  6. Body mass index is 45.29 kg/(m^2). - Morbidly obese    Chest pain atypical for ACS. Pleuritic, positional and palpable. Related to invading tumor into sternum. Echo and EKG ordered. Will follow up on troponin. Will follow up on Echo and EKG results. If CP related to ACS, recommend medical management at this time. Review of Systems:     GENERAL   Recent weight loss - no   Fever -----------------   no   Chills -----------------   no     EYES, VISION   Visual Changes - no     EARS, NOSE, THROAT   Hearing loss ----------- no   Swallowing difficulties - no     CARDIOVASCULAR   Chest pain/pressure ---- yes   Arrhythmia/palpitations - no       RESPIRATORY   Cough ------------------ yes   Shortness of breath - yes   Wheezing -------------- no   GASTROINTESTINAL   Abdominal pain - no   Heartburn -------- no   Bloody stool ----- no     GENITOURINARY   Frequent urination - no   Urgency -------------- no     MUSCULOSKELETAL   Joint pain/swelling ---- no   Musculoskeletal pain - no     SKIN & INTEGUMENTARY   Rashes - no   Sores --- no         NEUROLOGICAL   Numbness/tingling - no   Sensation loss ------ no     PSYCHIATRIC   Nervousness/anxiety - no   Depression -------------- no     ENDOCRINE   Heat/cold intolerance - no   Excessive thirst -------- no     HEMATOLOGIC/LYMPHATIC   Abnormal bleeding - no     ALL/IMMUN   Allergic reaction ------ no   Recurrent infections - no     Previous treatment/evaluation includes echocardiogram and persantine thallium .   Cardiac risk factors: dyslipidemia, diabetes mellitus, obesity, sedentary life style, hypertension, post-menopausal.    Past Medical History:   Diagnosis Date    Adverse effect of anesthesia     sleep apnea cpap machine useage     Anemia     Arthritis     DDD low back and knees    Asthma     uses albuterol prn only; none x 6 mos.   Never hospitalized    BRCA negative 2013    Calculus of kidney     Chronic kidney disease     kidney stones    Chronic pain     knee pain bilat    CINV (chemotherapy-induced nausea and vomiting) 2014    Decreased hearing, right     PATIENT STATES THIS IS DUE TO CHEMOTHERAPY    Diabetes ([address] Age 46    IDDM    Environmental allergies     Fibromyalgia     GERD (gastroesophageal reflux disease)     controlled with med    Hypertension     Ill-defined condition     Sepsis  -  SOURCE PORT    Ill-defined condition 2016    chemotherapy     Mass of chest wall 2018    Morbid obesity ([address]     Murmur, heart     Other and unspecified hyperlipidemia     Ovarian cancer ([address] 2012, 2014    serous, high grade, stage IIIc. s/p chemotx (has port)    Psychiatric disorder     Rectal bleeding     Thromboembolus ([address]     POST PARTUM; resolved- PELVIS    Thyroid disease     HYPOTHYROID    Unspecified sleep apnea     CPAP, Dr. Judith Tinajero     Past Surgical History:   Procedure Laterality Date    BREAST SURGERY PROCEDURE UNLISTED      Lymph node in left breast 2017    CHEST SURGERY PROCEDURE UNLISTED      Placement of port a cath right chest    HX GYN       X2    HX HYSTERECTOMY  2012    ROULA/BSO, tumor debulking, omentectomy for ovarian cancer    HX ORTHOPAEDIC      ankle-FX, R    HX ORTHOPAEDIC      FX R ARM    HX UROLOGICAL      stent    HX VASCULAR ACCESS  2015    right chest- port placed     Current Facility-Administered Medications   Medication Dose Route Frequency    sodium chloride (NS) flush 5-10 mL  5-10 mL IntraVENous Q8H    sodium chloride (NS) flush 5-10 mL  5-10 mL IntraVENous PRN  HYDROcodone-acetaminophen (NORCO) 5-325 mg per tablet 1 Tab  1 Tab Oral Q4H PRN    morphine injection 2 mg  2 mg IntraVENous Q4H PRN    naloxone (NARCAN) injection 0.4 mg  0.4 mg IntraVENous PRN    diphenhydrAMINE (BENADRYL) injection 12.5 mg  12.5 mg IntraVENous Q4H PRN    ondansetron (ZOFRAN) injection 4 mg  4 mg IntraVENous Q4H PRN    docusate sodium (COLACE) capsule 100 mg  100 mg Oral DAILY    [START ON 7/18/2018] enoxaparin (LOVENOX) injection 40 mg  40 mg SubCUTAneous Q24H    methylPREDNISolone (PF) (SOLU-MEDROL) injection 40 mg  40 mg IntraVENous Q4H    sodium chloride 0.9 % bolus infusion 100 mL  100 mL IntraVENous RAD ONCE    iopamidol (ISOVUE-370) 76 % injection 100 mL  100 mL IntraVENous RAD ONCE    sodium chloride (NS) flush 10 mL  10 mL IntraVENous RAD ONCE    albuterol (PROVENTIL VENTOLIN) nebulizer solution 2.5 mg  2.5 mg Nebulization Q6H PRN    clonazePAM (KlonoPIN) tablet 0.5 mg  0.5 mg Oral QHS PRN    . PHARMACY TO SUBSTITUTE PER PROTOCOL (Reordered from: cycloSPORINE (RESTASIS) 0.05 % ophthalmic emulsion)    Per Protocol    pantoprazole (PROTONIX) tablet 40 mg  40 mg Oral DAILY    famotidine (PEPCID) tablet 20 mg  20 mg Oral DAILY    loratadine (CLARITIN) tablet 10 mg  10 mg Oral DAILY    fluticasone (FLONASE) 50 mcg/actuation nasal spray 2 Spray  2 Spray Both Nostrils DAILY    insulin lispro (HUMALOG) injection   SubCUTAneous TIDAC    levothyroxine (SYNTHROID) tablet 150 mcg  150 mcg Oral ACB    . PHARMACY TO SUBSTITUTE PER PROTOCOL (Reordered from: LINZESS 145 mcg cap capsule)    Per Protocol    . PHARMACY TO SUBSTITUTE PER PROTOCOL (Reordered from: Liraglutide (VICTOZA) 0.6 mg/0.1 mL (18 mg/3 mL) pnlux)    Per Protocol    white Petrolatum-Mineral Oil (AKWA TEARS) ophthalmic ointment   Both Eyes PRN    montelukast (SINGULAIR) tablet 10 mg  10 mg Oral QHS    sertraline (ZOLOFT) tablet 50 mg  50 mg Oral DAILY    . PHARMACY TO SUBSTITUTE PER PROTOCOL (Reordered from: SYMBICORT 160-4.5 mcg/actuation HFAA)    Per Protocol    . PHARMACY TO SUBSTITUTE PER PROTOCOL (Reordered from: valsartan (DIOVAN) 80 mg tablet)    Per Protocol    insulin lispro (HUMALOG) injection   SubCUTAneous AC&HS    glucose chewable tablet 16 g  4 Tab Oral PRN    dextrose (D50W) injection syrg 12.5-25 g  12.5-25 g IntraVENous PRN    glucagon (GLUCAGEN) injection 1 mg  1 mg IntraMUSCular PRN    0.9% sodium chloride infusion  125 mL/hr IntraVENous CONTINUOUS    iohexol (OMNIPAQUE) solution 50 mL  50 mL Oral RAD ONCE     Facility-Administered Medications Ordered in Other Encounters   Medication Dose Route Frequency    diphenhydrAMINE (BENADRYL) injection 50 mg  50 mg IntraVENous ONCE    famotidine (PF) (PEPCID) 20 mg in sodium chloride 0.9% 10 mL injection  20 mg IntraVENous ONCE    dexamethasone (DECADRON) 4 mg/mL injection 10 mg  10 mg IntraVENous ONCE    PACLitaxel (TAXOL) 120 mg in dextrose 5% 250 mL, overfill volume 25 mL chemo infusion  120 mg IntraVENous ONCE       Allergies   Allergen Reactions    Iodinated Contrast- Oral And Iv Dye Rash     13 hr pre-medication prior to IV contrast    Carboplatin Other (comments)     Patient developed shortness of breath, felt faint, coughing, skin flushed and \"itchy\". This occurred on the patients 8th treatment.     Lipitor [Atorvastatin] Myalgia    Percocet [Oxycodone-Acetaminophen] Other (comments)     fever    Shellfish Containing Products Hives    Tape [Adhesive] Itching and Other (comments)     \"op site-- clear,thin tape\"--caused blister     Tomato Hives      Family History   Problem Relation Age of Onset    Hypertension Mother     Arthritis-osteo Mother     Lung Disease Father      COPD    Hypertension Father     Arthritis-osteo Father     Cancer Father      PROSTATE    Hypertension Brother     Elevated Lipids Brother     Arthritis-osteo Brother     Hypertension Brother     Elevated Lipids Brother     Obesity Brother     Cancer Brother      PROSTATE    Anesth Problems Son      DELAYED AWAKENING    Diabetes Maternal Grandmother     Anesth Problems Paternal Grandmother      PATIENT [address], [address] STOPPED DURING SURGERY      Social History     Social History    Marital status:      Spouse name: N/A    Number of children: N/A    Years of education: N/A     Social History Main Topics    Smoking status: Never Smoker    Smokeless tobacco: Never Used    Alcohol use Yes      Comment: 2 DRINKS EVERY 2 MONTHS    Drug use: No    Sexual activity: Yes     Other Topics Concern    Not on file     Social History Narrative    Lives in [facility] alone.  passed away in 5/16 of a heart attack. Has 2 sons. Disability. 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 Enjoys swimming and going to the gym. Objective:    Physical Exam    Vitals:   Vitals:    07/17/18 0248 07/17/18 0500 07/17/18 0723 07/17/18 0758   BP: 145/86 120/68  148/88   Pulse: 79 68  76   Resp: 20 17 18   Temp: 98.2 °F (36.8 °C) 98.3 °F (36.8 °C)  98.4 °F (36.9 °C)   SpO2: 98% 95%  96%   Weight:   305 lb 12.8 oz (138.7 kg)        General:    Alert, cooperative, no distress, appears stated age. Neck:   Supple, no carotid bruit and no JVD. Back:     Symmetric, normal curvature. Lungs:      Rales to auscultation bilaterally. Heart[de-identified]    Regular rate and rhythm, S1, S2 normal, no murmur, click, rub or gallop. Abdomen:     Soft, non-tender. Bowel sounds normal.    Extremities:   Extremities normal, atraumatic, no cyanosis or edema. Vascular:   Pulses - 2+ radials   Skin:   Skin color normal. No rashes or lesions   Neurologic:   CN II-XII grossly intact.         Telemetry: none    ECG:   EKG Results     None            Data Review:     Radiology:   XR Results (most recent):    Results from Hospital Encounter encounter on 05/04/18   XR CHEST PORT   Narrative EXAM:  XR CHEST PORT    INDICATION:  Port-A-Cath placement    COMPARISON: CT chest 4/10/2018. Chest radiograph 12/26/2017    TECHNIQUE: Portable AP upright chest view at 0839 hours    FINDINGS: The left chest Port-A-Cath terminates in profile with the right  atrium. There is no pneumothorax. The lungs are clear. Impression IMPRESSION:    No pneumothorax following left chest Port-A-Cath placement. INTERPRETATION PROVIDED FOR COMPLIANCE ONLY AT NO CHARGE             No results for input(s): CPK, TROIQ in the last 72 hours. No lab exists for component: CKQMB, CPKMB, BMPP  No results for input(s): NA, K, CL, CO2, BUN, CREA, GLU, PHOS, CA in the last 72 hours. No results for input(s): WBC, HGB, HCT, PLT, HGBEXT, HCTEXT, PLTEXT in the last 72 hours. No results for input(s): PTP, INR, GPT, SGOT, AP in the last 72 hours. No lab exists for component: PTTP, INREXT  No results for input(s): CHOL, LDLC in the last 72 hours. No lab exists for component: TGL, HDLC,  HBA1C  No results for input(s): CRP, TSH, TSHEXT in the last 72 hours. No lab exists for component: ESR    Radha Kaur. Finesse Cruz M.D.          [facility] [address] [place] [address] Rd[address] [facility] [address] [facility] [address]     [facility], [address]     [phone]    Freddy Treadwell MD

## 2018-07-17 NOTE — PROGRESS NOTES
TRANSFER - OUT REPORT:    Verbal report given to Vandana Jacques RN (name) on Armand Vega  being transferred to [facility](unit) for urgent transfer       Report consisted of patients Situation, Background, Assessment and   Recommendations(SBAR). Information from the following report(s) SBAR, Kardex and MAR was reviewed with the receiving nurse. Lines:   Venous Access Device ANGIODYNAMICS SMART PORT 05/04/18 Upper chest (subclavicular area), left (Active)        Opportunity for questions and clarification was provided.       Patient transported with:   Registered Nurse

## 2018-07-17 NOTE — DIABETES MGMT
DTC Consult Note    Recommendations/ Comments: BG's within range. Solu Medrol 40 mg Q4 hours begun. DTC will follow and observe BG's closely. On clear liquids. Current hospital DM medication: lispro correction scale, resistant sensitivity    Consult received for:  [x]             Assessment of home management                [x]      Medication Recommendations                []             Meter/monitoring     []             Insulin instruction     []             New diagnosis     []             Outpatient education     []             Insulin pump patient     []             Insulin infusion     []             DKA/HHS    Chart reviewed and initial evaluation complete on Cathleen Blake. Patient is a 65 y.o. female with known DM on the following at home:   Victoza 1.8 mg daily  Levemir 30 units at bedtime  Novolog 20 units AC + 4 units/50 > 150  Hx Ovarian cancer, metastasis. Admitted with SOB, chest pain    She states she is doing well caring for DM at home. BG monitoring at home 2x/day. Patient reports no results < 80 or > 200    Assessed and instructed patient on the following:   · interpretation of lab results, Most recent A1c 5.9% on 3-  · blood sugar goals,   · hypoglycemia prevention and treatment,   · SMBG skills and   · Nutrition - she states her appetite hs been good    Provided patient with the following: [x]             Survival skills education materials               []             Insulin education materials               []             CHO counting education materials               [x]             Outpatient DTC contact number               []             Glucometer               Discussed with patient and/or family need for follow up appointment for diabetes management after discharge.       A1c:   Lab Results   Component Value Date/Time    Hemoglobin A1c 5.9 (H) 03/28/2018 11:47 AM       Recent Glucose Results: Lab Results   Component Value Date/Time     (H) 07/17/2018 08:32 AM    GLUCPOC 146 (H) 07/17/2018 11:18 AM        Lab Results   Component Value Date/Time    Creatinine 0.99 07/17/2018 08:32 AM     Estimated Creatinine Clearance: 95.3 mL/min (based on Cr of 0.99). Active Orders   Diet    DIET CLEAR LIQUID        PO intake: Patient Vitals for the past 72 hrs:   % Diet Eaten   07/17/18 1400 100 %   07/17/18 0958 100 %       Will continue to follow as needed. Thank you.   Severino Kunz RN, CDE

## 2018-07-17 NOTE — PROGRESS NOTES
TRANSFER - IN REPORT:    Verbal report received from Familia Nelson, RN(name) on Cathleen PARTIDA [facility]  being received from [facility](unit) for routine progression of care      Report consisted of patients Situation, Background, Assessment and   Recommendations(SBAR). Information from the following report(s) SBAR, Kardex, Intake/Output, MAR and Recent Results was reviewed with the receiving nurse. Opportunity for questions and clarification was provided. Assessment completed upon patients arrival to unit and care assumed. 1915  Patient admitted to [facility]. Oriented to room. Call bell at bedside.

## 2018-07-17 NOTE — H&P
[facility] Gynecological Oncology  [address] [facility], [address]  Phone [phone]  Fax: [phone]       Luis Tolentino       [id number]  1960    Admitted 2018 Date 2018     HPI:   Ms. Breanna Jordan is a long time pt of Dr. Misty Conklin. She was initially dx with ovarian cancer in 2012. She had MARAH/BSO/Cytoreductive surgery on 12. At that time she was Stage III C. She recovered and began 6 cycles of Carbo/Taxol. Had good response, but reoccurred and in  she received 3 cycles of Carbo/Taxol before having a reaction to Carbo and this was stopped. She continued her next 3 cycles with Doxil/Avastin. This did not decrease disease. .   She had a treatment break from 3/2015 until she restarted with Topatecan for 11 cycles from 2015 through 2016. She was followed by Dr. Misty Conklin and in 2017, she had a CT scan that showed recurrence of disease. She was given Gemzar with good results. In 2018, she presented to the [address] [facility] ER with c/o CP and a Chest CT  showed chest wall mass in the right parasternal region that invades the sternum with \"massive adenopathy in the mediastinum and increasing adenopathy in the retrocrural region and upper abd. CT guided bx of chest wall mass was performed that showed Metastatic high-grade serous carcinoma   At this point, Dr. Misty Conklin recommended weekly Taxol D1, 8, 15 (he discussed the palliative nature of this treatment)  She began this on 18.          Diagnosis: Ovarian cancer  Original Surgery: 12 Ex-lap, ROULA/BSO, radical tumor debulking  Pathology: High grade serious ovarian cancer with omental cake and carcinomatosis. Stage IIIC     Oncology treatment history:  2012: Dx with ovarian cancer in 2012. She had MARAH/BSO/Cytoreductive surgery on 12.  At that time she was Stage III C  10/2012-2013: 6 cycles Carbo/Taxol  2014-2014: 6 Cycles carbo/Taxol  3/2015-5/13/15: 3 cycles Avastin/Doxil until progression  7/2015-8/2015 Weekly Topotecan   12/2015-3/2016: Weekly Topotecan  2/2017-9/2017:  Gemzar D1/D8 Q28 days for 6 cycles  5/8/2018-Present: Taxol  D1,8,15 for scheduled 3  cycles       SUBJECTIVE:  Patient noted with above history as well as HTN, Hyperlipidemia,Pulmonary HTN, Sleep apnea, DM, Morbid obesity, hypothyroidism, asthma, mediastinal lymphadenopathy and history of PE during pregnancy who is followed for CV dz by Dr. Caresse Severin. She presented to [facility] ER last evening after noticing some hoarseness on Sunday with URI symptoms like \"stuffiness\" and a cough. Then yesterday, she began to notice increasing SOB with chest pain and increasing \"feeling of pressure on my chest\" that she said did not go away and got worse with exertion. She drove herself to the local ER. EKG there showed NSR. Initial Troponin was 0.017 (~ 0005 this today) she was given a dose of Lovenox and had portable CXR with labs and transferred to [address] [place] [address] under our service. This morning she says her chest continues to feel like there is some pressure on it, but feels it is \"a little better then last night'. Says SOB is better at rest, but is still present with exertion. She says she has noticed her cough getting worse over last few days with some production at times. Always clear. Denies any hemoptysis. Her last infusion of her Taxol was on July 10th and says she was doing well from that.           Patient Active Problem List    Diagnosis Date Noted    Diabetes ([facility] 07/17/2018    Chest pain 07/17/2018    HTN (hypertension) 07/17/2018    SOB (shortness of breath) 07/17/2018    Obesity 07/17/2018    Acquired hypothyroidism 07/17/2018    Anemia 07/17/2018    Contrast media allergy 07/09/2018    On antineoplastic chemotherapy 05/31/2018    Reactive depression 05/31/2018    Stage 1 chronic kidney disease 05/31/2018    Chest mass 04/10/2018    Chest wall mass 04/10/2018    Type 2 diabetes mellitus with nephropathy ([address] 12/26/2017    Lymphadenopathy, mediastinal 07/18/2017    Pulmonary hypertension, mild ([address] 03/28/2017    Renal calculi 03/02/2017    Hyperglycemia due to type 2 diabetes mellitus ([address] 02/15/2017    Angina pectoris associated with type 2 diabetes mellitus ([address] 02/15/2017    Ovarian cancer ([address] 04/28/2015    Portacath in place--left 01/18/2015    Chest pain, atypical 01/18/2015    Hx of pulmonary embolus during pregnancy 01/18/2015    CINV (chemotherapy-induced nausea and vomiting) 08/21/2014    Benign essential hypertension 04/04/2014    Disorder of thyroid 04/04/2014    Dyslipidemia (high LDL; low HDL)     Sleep apnea 02/28/2014    Abnormal mammogram 01/15/2014    Carcinomatosis ([address] 09/22/2012    Controlled type 2 diabetes mellitus without complication, with long-term current use of insulin ([address] 09/22/2012    Morbid obesity ([address] 09/22/2012     Past Medical History:   Diagnosis Date    Adverse effect of anesthesia     sleep apnea cpap machine useage     Anemia     Arthritis     DDD low back and knees    Asthma     uses albuterol prn only; none x 6 mos.   Never hospitalized    BRCA negative 1/2013    Calculus of kidney     Chronic kidney disease     kidney stones    Chronic pain     knee pain bilat    CINV (chemotherapy-induced nausea and vomiting) 8/21/2014    Decreased hearing, right     PATIENT STATES THIS IS DUE TO CHEMOTHERAPY    Diabetes ([address] Age 46    IDDM    Environmental allergies     Fibromyalgia     GERD (gastroesophageal reflux disease)     controlled with med    Hypertension     Ill-defined condition     Sepsis 9-2015 -  SOURCE PORT    Ill-defined condition 2016    chemotherapy     Mass of chest wall 05/2018    Morbid obesity ([address]     Murmur, heart     Other and unspecified hyperlipidemia     Ovarian cancer ([address] 9/2012, 1/2014    serous, high grade, stage IIIc. s/p chemotx (has port)    Psychiatric disorder     Rectal bleeding     Thromboembolus ([address]     POST PARTUM; resolved- PELVIS    Thyroid disease     HYPOTHYROID    Unspecified sleep apnea     CPAP, Dr. Garcia Delay      Past Surgical History:   Procedure Laterality Date    BREAST SURGERY PROCEDURE UNLISTED      Lymph node in left breast 2017    CHEST SURGERY PROCEDURE UNLISTED      Placement of port a cath right chest    HX GYN       X2    HX HYSTERECTOMY  2012    ROULA/BSO, tumor debulking, omentectomy for ovarian cancer    HX ORTHOPAEDIC      ankle-FX, R    HX ORTHOPAEDIC      FX R ARM    HX UROLOGICAL      stent    HX VASCULAR ACCESS  2015    right chest- port placed      Social History   Substance Use Topics    Smoking status: Never Smoker    Smokeless tobacco: Never Used    Alcohol use Yes      Comment: 2 DRINKS EVERY 2 MONTHS      Family History   Problem Relation Age of Onset    Hypertension Mother     Arthritis-osteo Mother     Lung Disease Father      COPD    Hypertension Father     Arthritis-osteo Father     Cancer Father      PROSTATE    Hypertension Brother     Elevated Lipids Brother     Arthritis-osteo Brother     Hypertension Brother     Elevated Lipids Brother     Obesity Brother     Cancer Brother      PROSTATE    Anesth Problems Son      DELAYED AWAKENING    Diabetes Maternal Grandmother     Anesth Problems Paternal [address] STOPPED DURING SURGERY      Current Facility-Administered Medications   Medication Dose Route Frequency    sodium chloride (NS) flush 5-10 mL  5-10 mL IntraVENous Q8H    sodium chloride (NS) flush 5-10 mL  5-10 mL IntraVENous PRN    HYDROcodone-acetaminophen (NORCO) 5-325 mg per tablet 1 Tab  1 Tab Oral Q4H PRN    morphine injection 2 mg  2 mg IntraVENous Q4H PRN    naloxone (NARCAN) injection 0.4 mg  0.4 mg IntraVENous PRN    diphenhydrAMINE (BENADRYL) injection 12.5 mg  12.5 mg IntraVENous Q4H PRN    ondansetron (ZOFRAN) injection 4 mg  4 mg IntraVENous Q4H PRN    docusate sodium (COLACE) capsule 100 mg  100 mg Oral DAILY    [START ON 7/18/2018] enoxaparin (LOVENOX) injection 40 mg  40 mg SubCUTAneous Q24H    methylPREDNISolone (PF) (SOLU-MEDROL) injection 40 mg  40 mg IntraVENous Q4H    sodium chloride 0.9 % bolus infusion 100 mL  100 mL IntraVENous RAD ONCE    iopamidol (ISOVUE-370) 76 % injection 100 mL  100 mL IntraVENous RAD ONCE    sodium chloride (NS) flush 10 mL  10 mL IntraVENous RAD ONCE    albuterol (PROVENTIL VENTOLIN) nebulizer solution 2.5 mg  2.5 mg Nebulization Q6H PRN    clonazePAM (KlonoPIN) tablet 0.5 mg  0.5 mg Oral QHS PRN    . PHARMACY TO SUBSTITUTE PER PROTOCOL (Reordered from: cycloSPORINE (RESTASIS) 0.05 % ophthalmic emulsion)    Per Protocol    pantoprazole (PROTONIX) tablet 40 mg  40 mg Oral DAILY    famotidine (PEPCID) tablet 20 mg  20 mg Oral DAILY    loratadine (CLARITIN) tablet 10 mg  10 mg Oral DAILY    fluticasone (FLONASE) 50 mcg/actuation nasal spray 2 Spray  2 Spray Both Nostrils DAILY    insulin lispro (HUMALOG) injection   SubCUTAneous TIDAC    levothyroxine (SYNTHROID) tablet 150 mcg  150 mcg Oral ACB    . PHARMACY TO SUBSTITUTE PER PROTOCOL (Reordered from: LINZESS 145 mcg cap capsule)    Per Protocol    . PHARMACY TO SUBSTITUTE PER PROTOCOL (Reordered from: Liraglutide (VICTOZA) 0.6 mg/0.1 mL (18 mg/3 mL) saleem)    Per Protocol    white Petrolatum-Mineral Oil (AKWA TEARS) ophthalmic ointment   Both Eyes PRN    montelukast (SINGULAIR) tablet 10 mg  10 mg Oral QHS    sertraline (ZOLOFT) tablet 50 mg  50 mg Oral DAILY    . PHARMACY TO SUBSTITUTE PER PROTOCOL (Reordered from: SYMBICORT 160-4.5 mcg/actuation HFAA)    Per Protocol    . PHARMACY TO SUBSTITUTE PER PROTOCOL (Reordered from: valsartan (DIOVAN) 80 mg tablet)    Per Protocol    insulin lispro (HUMALOG) injection   SubCUTAneous AC&HS    glucose chewable tablet 16 g  4 Tab Oral PRN    dextrose (D50W) injection syrg 12.5-25 g  12.5-25 g IntraVENous PRN  glucagon (GLUCAGEN) injection 1 mg  1 mg IntraMUSCular PRN    0.9% sodium chloride infusion  125 mL/hr IntraVENous CONTINUOUS    iohexol (OMNIPAQUE) solution 50 mL  50 mL Oral RAD ONCE     Facility-Administered Medications Ordered in Other Encounters   Medication Dose Route Frequency    diphenhydrAMINE (BENADRYL) injection 50 mg  50 mg IntraVENous ONCE    famotidine (PF) (PEPCID) 20 mg in sodium chloride 0.9% 10 mL injection  20 mg IntraVENous ONCE    dexamethasone (DECADRON) 4 mg/mL injection 10 mg  10 mg IntraVENous ONCE    PACLitaxel (TAXOL) 120 mg in dextrose 5% 250 mL, overfill volume 25 mL chemo infusion  120 mg IntraVENous ONCE     Allergies   Allergen Reactions    Iodinated Contrast- Oral And Iv Dye Rash     13 hr pre-medication prior to IV contrast    Carboplatin Other (comments)     Patient developed shortness of breath, felt faint, coughing, skin flushed and \"itchy\". This occurred on the patients 8th treatment.  Lipitor [Atorvastatin] Myalgia    Percocet [Oxycodone-Acetaminophen] Other (comments)     fever    Shellfish Containing Products Hives    Tape [Adhesive] Itching and Other (comments)     \"op site-- clear,thin tape\"--caused blister     Tomato Hives        Review of Systems  General: Denies wt loss. Says she has noticed increase in fatigue this weekend and thought it was because she was getting a cold  HEENT: Denies visual changes, dysphagia or headache. Does acknowledge nasal congestion that began 7/14-/715  Resp: Says she has had increasing cough and SOB with occ wheezing. CV: Acknowledges chest pressure and tightness with occ pain. Denies palpitations  GI/: Denies N/V, diarrhea, constipation or dysuria. Says she has been able to eat, just had decreased appetite since all the above began  MuskSkel: Continues with DJD knee pain. She continues to complains of left leg swelling and discomfort to lower leg. Says she is keeping it elevated (dopplers negative on 5/23).  Denies numbness, feeling of coolness or weakness. None to right   Neuro: Denies dizzyness or syncope      OBJECTIVE:    Physical Exam  General: A&O X3, tired appearing with pleasant affect  HEENT: Sclera anicteric, Mucosa pale, moist. Pupils equal and reactive to light   Neck: No JVD or cervical adenopathy appreciated. Chest Wall: mass noted to ~4th-6th sternal/rib area. Firm and extending outward towards right chest wall. Mild discomfort with palpation   Heart: Regular without M/R/G  Lungs: Rales noted throughout with wheezing noted intermittently, non-productive cough noted with deep breath   Abd: Soft, obese without tenderness or fluid wave noted + BS throughout  Ext: Left lower ext with good pulses and color. Persistent 2+ non-pitting edema noted (improved) . Right leg with + pedal pulses with 1+ edema    Neuro: grossly intact      Data Review  Pending at this time from this morning. Labs last evening from [facility]: WBC 7.8;  Hgb 8.8; HCT 26.8; Plt 195; Na 141; K+ 4.3; Cl 105: CO2 28.3; BUN 22; Creat 1.18; Gluc 143      Imaging  Awaiting to obtain CTA chest and CT abd/pelvis    IMPRESSION:    Patient Active Problem List   Diagnosis Code    Carcinomatosis ([facility] C80.0    Controlled type 2 diabetes mellitus without complication, with long-term current use of insulin (HCC) E11.9, Z79.4    Morbid obesity ([address] E66.01    Abnormal mammogram R92.8    Sleep apnea G47.30    Dyslipidemia (high LDL; low HDL) E78.5    Benign essential hypertension I10    Disorder of thyroid E07.9    CINV (chemotherapy-induced nausea and vomiting) R11.2, T45.1X5A    Portacath in place--left Z95.828    Chest pain, atypical R07.89    Hx of pulmonary embolus during pregnancy Z86.711, Z87.59    Ovarian cancer (HCC) C56.9    Hyperglycemia due to type 2 diabetes mellitus ([address] E11.65    Angina pectoris associated with type 2 diabetes mellitus (HCC) E11.59, I20.9    Renal calculi N20.0    Pulmonary hypertension, mild (HCC) I27.20  Lymphadenopathy, mediastinal R59.0    Type 2 diabetes mellitus with nephropathy (HCC) E11.21    Chest mass R22.2    Chest wall mass R22.2    On antineoplastic chemotherapy Z79.899    Reactive depression F32.9    Stage 1 chronic kidney disease N18.1    Contrast media allergy Z91.041    Diabetes (HCC) E11.9    Chest pain R07.9    HTN (hypertension) I10    SOB (shortness of breath) R06.02    Obesity E66.9    Acquired hypothyroidism E03.9    Anemia D64.9       PLAN:  CV: Will continue current home CV meds. Consult cardiology, serial tropins and transfer to telemetry. (Dr. Monika Holt does not come to this hospital and will consult [facility])  Resp: R/O PE and evaluate mediastinal mass (?enlarging with ?pressure on trach with pt's complaint of hoarseness) with CTA and continue Lovenox (last dose 0005 7/17/2018) Nebs, O2 prn for sat's less then 92%. IS hourly and if possible, will obtain sputum cultures. Continue home Symbacort/Albuterol/Singular/Flonase  Onc: Since pt was to have a scheduled CT scan for evaluation of disease next week, will at CT of abd/pelvis today since pt has contrast allergy and is being pre-medicated for CTA already. Will hold chemo scheduled         for today. Endocrine: Continue Synthroid. Check TSH. Continue home diabetic medications and DTC consult. Will add correction coverage. Diabetic diet  GI: clear liquids for now. Continue PPI/Linzess. Zofran PRN   Heme: Hgb 8.8 will monitor and transfuse if needed  Psych: continue Zoloft and monitor for depression.     Signed By: Afsaneh Bennett NP     7/17/2018/8:23 AM

## 2018-07-17 NOTE — PROGRESS NOTES
TRANSFER - IN REPORT:    Verbal report received from Angel Paige RN (name) on Vamsi Tapia  being received from ED- MARTHA Tirado(unit) for routine progression of care      Report consisted of patients Situation, Background, Assessment and   Recommendations(SBAR). Information from the following report(s) SBAR, Kardex, ED Summary and Recent Results was reviewed with the receiving nurse. Opportunity for questions and clarification was provided. Assessment completed upon patients arrival to unit and care assumed.

## 2018-07-17 NOTE — IP AVS SNAPSHOT
[address] 
[phone] Patient: Brennon Brink MRN: [id number] :1960 A check chikis indicates which time of day the medication should be taken. My Medications START taking these medications Instructions Each Dose to Equal  
 Morning Noon Evening Bedtime  
 cromolyn 5.2 mg/spray (4 %) nasal spray Commonly known as:  Jaci Sylvester Your last dose was: Your next dose is:    
   
   
 1 Spray by Both Nostrils route four (4) times daily. 1 Spray  
    
   
   
   
  
 fexofenadine-pseudoephedrine 180-240 mg per tablet Commonly known as:  ALLEGRA-D 24 Your last dose was: Your next dose is: Take 1 Tab by mouth daily for 4 days. 1 Tab  
    
   
   
   
  
 guaiFENesin-codeine 100-10 mg/5 mL solution Commonly known as:  ROBITUSSIN AC Your last dose was: Your next dose is: Take 10 mL by mouth four (4) times daily as needed for Cough or Congestion. Max Daily Amount: 40 mL. 10 mL  
    
   
   
   
  
 trimethoprim-polymyxin b ophthalmic solution Commonly known as:  POLYTRIM Your last dose was: Your next dose is:    
   
   
 Administer 1 Drop to right eye every four (4) hours for 7 days. Indications: BACTERIAL CONJUNCTIVITIS  
 1 Drop CONTINUE taking these medications Instructions Each Dose to Equal  
 Morning Noon Evening Bedtime  
 albuterol 2.5 mg /3 mL (0.083 %) nebulizer solution Commonly known as:  PROVENTIL VENTOLIN Your last dose was: Your next dose is:    
   
   
 USE 1 VAIL VIA NEBULIZER EVERY 4 HOURS AS NEEDED FOR WHEEZING  
     
   
   
   
  
 cholecalciferol (VITAMIN D3) 5,000 unit Tab tablet Commonly known as:  VITAMIN D3 Your last dose was: Your next dose is: Take 5,000 Units by mouth daily. 5000 Units clonazePAM 0.5 mg tablet Commonly known as:  Denison Boop Your last dose was: Your next dose is: Take 0.5 mg by mouth nightly as needed. 0.5 mg Dexlansoprazole 60 mg Cpdb Commonly known as:  DEXILANT Your last dose was: Your next dose is: Take 1 Cap by mouth daily. 60 mg  
    
   
   
   
  
 fluticasone 50 mcg/actuation nasal spray Commonly known as:  Efraín Gut Your last dose was: Your next dose is: 2 Sprays by Both Nostrils route daily as needed. 2 Spray  
    
   
   
   
  
 furosemide 40 mg tablet Commonly known as:  LASIX Your last dose was: Your next dose is: Take 40 mg by mouth daily as needed (edema; do not exceed 2-3 doses per week). 40 mg HYDROcodone-acetaminophen 5-325 mg per tablet Commonly known as:  Barry Diogenes Your last dose was: Your next dose is: Take 1 Tab by mouth every four (4) hours as needed for Pain. Max Daily Amount: 6 Tabs. 1 Tab  
    
   
   
   
  
 insulin aspart U-100 100 unit/mL Inpn Commonly known as:  NovoLOG Flexpen U-100 Insulin Your last dose was: Your next dose is: INJECT 20 UNITS BEFORE EACH MEAL + 4 UNITS FOR EVERY 50 MG/DL ABOVE 150 MG/DL.  UNITS PER DAY  
     
   
   
   
  
 insulin detemir U-100 100 unit/mL (3 mL) Inpn Commonly known as:  LEVEMIR FLEXTOUCH U-100 INSULN Your last dose was: Your next dose is: INJECT 30 UNITS IN AM AND 50 UNITS AT NIGHT. INCREASE AS DIRECTED TO  UNITS PER DAY--CALL [phone] FOR APPOINTMENT FOR MORE REFILLS  
     
   
   
   
  
 levothyroxine 150 mcg tablet Commonly known as:  SYNTHROID Your last dose was: Your next dose is: TAKE 1 TAB BY MOUTH DAILY (BEFORE BREAKFAST). --QPIY [phone] FOR APPOINTMENT FOR MORE REFILLS lidocaine-prilocaine topical cream  
Commonly known as:  EMLA Your last dose was: Your next dose is:    
   
   
 Apply small amount over port area one hour before chemo treatment and cover with a Band-Aid LINZESS 145 mcg Cap capsule Generic drug:  linaclotide Your last dose was: Your next dose is: TAKE 1 CAPSULE BY MOUTH EVERY DAY Liraglutide 0.6 mg/0.1 mL (18 mg/3 mL) Pnij Commonly known as:  Umer End Your last dose was: Your next dose is:    
   
   
 1.8 mg by SubCUTAneous route daily (with lunch). 1.8 mg  
    
   
   
   
  
 montelukast 10 mg tablet Commonly known as:  SINGULAIR Your last dose was: Your next dose is: TAKE 1 TAB BY MOUTH DAILY. omega-3 acid ethyl esters 1 gram capsule Commonly known as:  May Edwar Your last dose was: Your next dose is: TAKE ONE CAPSULE BY MOUTH TWICE A DAY  
     
   
   
   
  
 ondansetron 8 mg disintegrating tablet Commonly known as:  ZOFRAN ODT Your last dose was: Your next dose is: Take 1 Tab by mouth every eight (8) hours as needed for Nausea. 8 mg  
    
   
   
   
  
 potassium chloride 10 mEq tablet Commonly known as:  KLOR-CON M10 Your last dose was: Your next dose is: TAKE 1 TAB BY MOUTH TWO (2) DAYS A WEEK. WED AND FRI  
     
   
   
   
  
 sertraline 50 mg tablet Commonly known as:  ZOLOFT Your last dose was: Your next dose is: TAKE 1 TABLET BY MOUTH EVERY MORNING  
     
   
   
   
  
 SYMBICORT 160-4.5 mcg/actuation Hfaa Generic drug:  budesonide-formoterol Your last dose was: Your next dose is: Take 2 Puffs by inhalation two (2) times daily as needed. 2 Puff  
    
   
   
   
  
 valsartan 80 mg tablet Commonly known as:  DIOVAN  
   
 Your last dose was: Your next dose is: TAKE 1 TAB BY MOUTH DAILY. STOP taking these medications ALLEGRA 180 mg tablet Generic drug:  fexofenadine Where to Get Your Medications Information on where to get these meds will be given to you by the nurse or doctor. ! Ask your nurse or doctor about these medications  
  cromolyn 5.2 mg/spray (4 %) nasal spray  
 fexofenadine-pseudoephedrine 180-240 mg per tablet  
 guaiFENesin-codeine 100-10 mg/5 mL solution  
 trimethoprim-polymyxin b ophthalmic solution

## 2018-07-18 PROBLEM — G89.3 PAIN DUE TO MALIGNANT NEOPLASM METASTATIC TO BONE (HCC): Status: ACTIVE | Noted: 2018-07-17

## 2018-07-18 PROBLEM — G89.3 PAIN DUE TO MALIGNANT NEOPLASM METASTATIC TO BONE (HCC): Status: ACTIVE | Noted: 2018-07-18

## 2018-07-18 PROBLEM — I10 HYPERTENSION: Status: ACTIVE | Noted: 2018-07-18

## 2018-07-18 PROBLEM — C79.51 PAIN DUE TO MALIGNANT NEOPLASM METASTATIC TO BONE (HCC): Status: ACTIVE | Noted: 2018-07-17

## 2018-07-18 PROBLEM — C79.51 PAIN DUE TO MALIGNANT NEOPLASM METASTATIC TO BONE (HCC): Status: ACTIVE | Noted: 2018-07-18

## 2018-07-18 LAB
ALBUMIN SERPL-MCNC: 2.6 G/DL (ref 3.5–5)
ALBUMIN/GLOB SERPL: 0.6 {RATIO} (ref 1.1–2.2)
ALP SERPL-CCNC: 64 U/L (ref 45–117)
ALT SERPL-CCNC: 19 U/L (ref 12–78)
ANION GAP SERPL CALC-SCNC: 11 MMOL/L (ref 5–15)
AST SERPL-CCNC: 23 U/L (ref 15–37)
ATRIAL RATE: 69 BPM
BASOPHILS # BLD: 0 K/UL (ref 0–0.1)
BASOPHILS NFR BLD: 0 % (ref 0–1)
BILIRUB SERPL-MCNC: 0.3 MG/DL (ref 0.2–1)
BUN SERPL-MCNC: 18 MG/DL (ref 6–20)
BUN/CREAT SERPL: 17 (ref 12–20)
CALCIUM SERPL-MCNC: 8.4 MG/DL (ref 8.5–10.1)
CALCULATED P AXIS, ECG09: 30 DEGREES
CALCULATED R AXIS, ECG10: -10 DEGREES
CALCULATED T AXIS, ECG11: 19 DEGREES
CHLORIDE SERPL-SCNC: 109 MMOL/L (ref 97–108)
CO2 SERPL-SCNC: 21 MMOL/L (ref 21–32)
CREAT SERPL-MCNC: 1.08 MG/DL (ref 0.55–1.02)
DIAGNOSIS, 93000: NORMAL
DIFFERENTIAL METHOD BLD: ABNORMAL
EOSINOPHIL # BLD: 0 K/UL (ref 0–0.4)
EOSINOPHIL NFR BLD: 0 % (ref 0–7)
ERYTHROCYTE [DISTWIDTH] IN BLOOD BY AUTOMATED COUNT: 14.4 % (ref 11.5–14.5)
GLOBULIN SER CALC-MCNC: 4.6 G/DL (ref 2–4)
GLUCOSE BLD STRIP.AUTO-MCNC: 148 MG/DL (ref 65–100)
GLUCOSE BLD STRIP.AUTO-MCNC: 152 MG/DL (ref 65–100)
GLUCOSE BLD STRIP.AUTO-MCNC: 187 MG/DL (ref 65–100)
GLUCOSE BLD STRIP.AUTO-MCNC: 195 MG/DL (ref 65–100)
GLUCOSE SERPL-MCNC: 189 MG/DL (ref 65–100)
HCT VFR BLD AUTO: 24.8 % (ref 35–47)
HGB BLD-MCNC: 7.5 G/DL (ref 11.5–16)
IMM GRANULOCYTES # BLD: 0.1 K/UL (ref 0–0.04)
IMM GRANULOCYTES NFR BLD AUTO: 1 % (ref 0–0.5)
LYMPHOCYTES # BLD: 1.2 K/UL (ref 0.8–3.5)
LYMPHOCYTES NFR BLD: 17 % (ref 12–49)
MCH RBC QN AUTO: 29.5 PG (ref 26–34)
MCHC RBC AUTO-ENTMCNC: 30.2 G/DL (ref 30–36.5)
MCV RBC AUTO: 97.6 FL (ref 80–99)
MONOCYTES # BLD: 0.8 K/UL (ref 0–1)
MONOCYTES NFR BLD: 11 % (ref 5–13)
NEUTS SEG # BLD: 5.2 K/UL (ref 1.8–8)
NEUTS SEG NFR BLD: 71 % (ref 32–75)
NRBC # BLD: 0 K/UL (ref 0–0.01)
NRBC BLD-RTO: 0 PER 100 WBC
P-R INTERVAL, ECG05: 170 MS
PLATELET # BLD AUTO: 181 K/UL (ref 150–400)
PMV BLD AUTO: 9.8 FL (ref 8.9–12.9)
POTASSIUM SERPL-SCNC: 4.5 MMOL/L (ref 3.5–5.1)
PROT SERPL-MCNC: 7.2 G/DL (ref 6.4–8.2)
Q-T INTERVAL, ECG07: 384 MS
QRS DURATION, ECG06: 78 MS
QTC CALCULATION (BEZET), ECG08: 411 MS
RBC # BLD AUTO: 2.54 M/UL (ref 3.8–5.2)
SERVICE CMNT-IMP: ABNORMAL
SODIUM SERPL-SCNC: 141 MMOL/L (ref 136–145)
TROPONIN I SERPL-MCNC: <0.05 NG/ML
VENTRICULAR RATE, ECG03: 69 BPM
WBC # BLD AUTO: 7.3 K/UL (ref 3.6–11)

## 2018-07-18 PROCEDURE — 94640 AIRWAY INHALATION TREATMENT: CPT

## 2018-07-18 PROCEDURE — 74011250637 HC RX REV CODE- 250/637: Performed by: PHYSICIAN ASSISTANT

## 2018-07-18 PROCEDURE — 65390000012 HC CONDITION CODE 44 OBSERVATION

## 2018-07-18 PROCEDURE — 96372 THER/PROPH/DIAG INJ SC/IM: CPT

## 2018-07-18 PROCEDURE — 96361 HYDRATE IV INFUSION ADD-ON: CPT

## 2018-07-18 PROCEDURE — 74011636637 HC RX REV CODE- 636/637: Performed by: NURSE PRACTITIONER

## 2018-07-18 PROCEDURE — 74011250637 HC RX REV CODE- 250/637: Performed by: NURSE PRACTITIONER

## 2018-07-18 PROCEDURE — 74011000250 HC RX REV CODE- 250: Performed by: NURSE PRACTITIONER

## 2018-07-18 PROCEDURE — 36415 COLL VENOUS BLD VENIPUNCTURE: CPT | Performed by: NURSE PRACTITIONER

## 2018-07-18 PROCEDURE — 74011250636 HC RX REV CODE- 250/636: Performed by: NURSE PRACTITIONER

## 2018-07-18 PROCEDURE — 82962 GLUCOSE BLOOD TEST: CPT

## 2018-07-18 PROCEDURE — 80053 COMPREHEN METABOLIC PANEL: CPT | Performed by: NURSE PRACTITIONER

## 2018-07-18 PROCEDURE — 84484 ASSAY OF TROPONIN QUANT: CPT | Performed by: NURSE PRACTITIONER

## 2018-07-18 PROCEDURE — 85025 COMPLETE CBC W/AUTO DIFF WBC: CPT | Performed by: NURSE PRACTITIONER

## 2018-07-18 PROCEDURE — 99218 HC RM OBSERVATION: CPT

## 2018-07-18 RX ORDER — DIPHENHYDRAMINE HCL 25 MG
25 CAPSULE ORAL
Status: DISCONTINUED | OUTPATIENT
Start: 2018-07-18 | End: 2018-07-19 | Stop reason: HOSPADM

## 2018-07-18 RX ORDER — CODEINE PHOSPHATE AND GUAIFENESIN 10; 100 MG/5ML; MG/5ML
10 SOLUTION ORAL
Status: DISCONTINUED | OUTPATIENT
Start: 2018-07-18 | End: 2018-07-19 | Stop reason: HOSPADM

## 2018-07-18 RX ADMIN — FLUTICASONE PROPIONATE 2 SPRAY: 50 SPRAY, METERED NASAL at 13:16

## 2018-07-18 RX ADMIN — INSULIN LISPRO 3 UNITS: 100 INJECTION, SOLUTION INTRAVENOUS; SUBCUTANEOUS at 07:16

## 2018-07-18 RX ADMIN — GUAIFENESIN AND CODEINE PHOSPHATE 10 ML: 100; 10 SOLUTION ORAL at 13:16

## 2018-07-18 RX ADMIN — VALSARTAN 80 MG: 80 TABLET ORAL at 09:14

## 2018-07-18 RX ADMIN — FAMOTIDINE 20 MG: 20 TABLET ORAL at 09:13

## 2018-07-18 RX ADMIN — MONTELUKAST SODIUM 10 MG: 10 TABLET, FILM COATED ORAL at 22:08

## 2018-07-18 RX ADMIN — LORATADINE, PSEUDOEPHEDRINE 1 TABLET: 5; 120 TABLET, EXTENDED RELEASE ORAL at 22:08

## 2018-07-18 RX ADMIN — ENOXAPARIN SODIUM 40 MG: 40 INJECTION, SOLUTION INTRAVENOUS; SUBCUTANEOUS at 07:14

## 2018-07-18 RX ADMIN — SERTRALINE HYDROCHLORIDE 50 MG: 50 TABLET ORAL at 09:13

## 2018-07-18 RX ADMIN — LORATADINE, PSEUDOEPHEDRINE 1 TABLET: 5; 120 TABLET, EXTENDED RELEASE ORAL at 13:28

## 2018-07-18 RX ADMIN — ARFORMOTEROL TARTRATE 15 MCG: 15 SOLUTION RESPIRATORY (INHALATION) at 19:51

## 2018-07-18 RX ADMIN — POLYVINYL ALCOHOL 1 DROP: 14 SOLUTION/ DROPS OPHTHALMIC at 19:07

## 2018-07-18 RX ADMIN — INSULIN LISPRO 3 UNITS: 100 INJECTION, SOLUTION INTRAVENOUS; SUBCUTANEOUS at 13:16

## 2018-07-18 RX ADMIN — LORATADINE 10 MG: 10 TABLET ORAL at 09:14

## 2018-07-18 RX ADMIN — Medication 10 ML: at 13:17

## 2018-07-18 RX ADMIN — ARFORMOTEROL TARTRATE 15 MCG: 15 SOLUTION RESPIRATORY (INHALATION) at 10:16

## 2018-07-18 RX ADMIN — LEVOTHYROXINE SODIUM 150 MCG: 75 TABLET ORAL at 07:15

## 2018-07-18 RX ADMIN — BENZOCAINE AND MENTHOL 1 LOZENGE: 15; 3.6 LOZENGE ORAL at 13:28

## 2018-07-18 RX ADMIN — DIPHENHYDRAMINE HYDROCHLORIDE 25 MG: 25 CAPSULE ORAL at 19:07

## 2018-07-18 RX ADMIN — DOCUSATE SODIUM 100 MG: 100 CAPSULE, LIQUID FILLED ORAL at 09:13

## 2018-07-18 RX ADMIN — Medication 10 ML: at 22:08

## 2018-07-18 RX ADMIN — GUAIFENESIN AND CODEINE PHOSPHATE 10 ML: 100; 10 SOLUTION ORAL at 17:32

## 2018-07-18 RX ADMIN — BUDESONIDE 500 MCG: 0.5 INHALANT RESPIRATORY (INHALATION) at 19:52

## 2018-07-18 RX ADMIN — BUDESONIDE 500 MCG: 0.5 INHALANT RESPIRATORY (INHALATION) at 10:16

## 2018-07-18 RX ADMIN — PANTOPRAZOLE SODIUM 40 MG: 40 TABLET, DELAYED RELEASE ORAL at 09:13

## 2018-07-18 RX ADMIN — INSULIN LISPRO 3 UNITS: 100 INJECTION, SOLUTION INTRAVENOUS; SUBCUTANEOUS at 17:33

## 2018-07-18 RX ADMIN — SODIUM CHLORIDE 125 ML/HR: 900 INJECTION, SOLUTION INTRAVENOUS at 04:26

## 2018-07-18 NOTE — PROGRESS NOTES
Patient mentioned to the incoming RN about LL Leg area that when she was asked by WENDY Freeman w/ RN Sherry Damian whether it was sore and that she agreed. Her @ LL Leg has an old brownish discoloration and to the area above it has an slight elevation. Pt states that this was present prior to admision. she further said that  Test using dopplers was done and that it was fine. Dax TENA notified no further orders. PA aware also regarding pt eyes irritated, PA w/ orders.

## 2018-07-18 NOTE — PROGRESS NOTES
Cardiology Progress Note            Admit Date: 7/17/2018  Admit Diagnosis: Chest Pain  Chest pain  Ovarian cancer ([facility]  SOB (shortness of breath)  Diabetes ([facility]  HTN (hypertension)  Obesity  Date: 7/18/2018     Time: 9:33 AM    Subjective:  Feeling better today. Chest pain resolved. Assessment and Plan     1. Chest pain - atypical                        - Pleuritic, positional and palpable                        - EKG NSR             - Echo EF 55-60%, NRWMA, normal valves                        - Troponin 0.05 x 4                        - History notes CAD, but see no identifying testing (No cath or CTA)  2. Chest wall mass                        - Invading into sternum - 4.3 x 3.9 cm mass lesion on the right at the sternoclavicular junction, by CTA                        - TTP                        - Per Gyn/Onc  3. HTN                        - Resume PTA meds  4. HLD                        - Resume PTA meds  5. DM                        - Recommend SSI  6. Body mass index is 45.29 kg/(m^2). - Morbidly obese     Chest pain improved with pain meds. EKG, troponin and echo all normal.  Needs no further cardiac testing. Will see again as needed.   Cardiology Attending:Patient seen and examined. I agree with NP assessment and plans. No tumor involvement of heart.     Delora Lanes, MD 7/18/2018 11:02 AM         ROS:     GENERAL   Recent weight loss - no   Fever -----------------   no   Chills -----------------   no     EYES, VISION   Visual Changes - no     EARS, NOSE, THROAT   Hearing loss ----------- no   Swallowing difficulties - no     CARDIOVASCULAR   Chest pain/pressure ---- no   Arrhythmia/palpitations - no       RESPIRATORY   Cough ------------------ no   Shortness of breath - no   Wheezing -------------- no   GASTROINTESTINAL   Abdominal pain - no   Heartburn -------- no   Bloody stool ----- no GENITOURINARY   Frequent urination - no   Urgency -------------- no     MUSCULOSKELETAL   Joint pain/swelling ---- no   Musculoskeletal pain - no     SKIN & INTEGUMENTARY   Rashes - no   Sores --- no         NEUROLOGICAL   Numbness/tingling - no   Sensation loss ------ no     PSYCHIATRIC   Nervousness/anxiety - no   Depression -------------- no     ENDOCRINE   Heat/cold intolerance - no   Excessive thirst -------- no     HEMATOLOGIC/LYMPHATIC   Abnormal bleeding - no     ALL/IMMUN   Allergic reaction ------ no   Recurrent infections - no     Objective:     Physical Exam:                Visit Vitals    /77 (BP 1 Location: Right arm, BP Patient Position: At rest)    Pulse 83    Temp 97.6 °F (36.4 °C)    Resp 16    Wt 305 lb 12.8 oz (138.7 kg)    SpO2 99%    BMI 45.29 kg/m2        General Appearance:   Well developed, well nourished,alert and oriented x 3, and   individual in no acute distress. Ears/Nose/Mouth/Throat:    Hearing grossly normal.         Neck:  Supple. Chest:    Lungs clear to auscultation bilaterally. Cardiovascular:   Regular rate and rhythm, S1, S2 normal, no murmur. Abdomen:    Soft, non-tender, bowel sounds are active. Extremities:  No edema bilaterally. Skin:  Warm and dry.      Telemetry:           Data Review:    Labs:    Recent Results (from the past 24 hour(s))   GLUCOSE, POC    Collection Time: 07/17/18 11:18 AM   Result Value Ref Range    Glucose (POC) 146 (H) 65 - 100 mg/dL    Performed by [facility]    ECG RHYTHM ANALYSIS ADULT    Collection Time: 07/17/18 11:48 AM   Result Value Ref Range    Ventricular Rate 69 BPM    Atrial Rate 69 BPM    P-R Interval 170 ms    QRS Duration 78 ms    Q-T Interval 384 ms    QTC Calculation (Bezet) 411 ms    Calculated P Axis 30 degrees    Calculated R Axis -10 degrees    Calculated T Axis 19 degrees    Diagnosis       Normal sinus rhythm  When compared with ECG of 10-APR-2018 18:22,  No significant change was found  Confirmed by Huber Horan MD, [facility] ([id number]) on 7/18/2018 8:08:08 AM     GLUCOSE, POC    Collection Time: 07/17/18  4:38 PM   Result Value Ref Range    Glucose (POC) 222 (H) 65 - 100 mg/dL    Performed by Virginia Campbell (Critical access hospital)    TROPONIN I    Collection Time: 07/17/18  9:41 PM   Result Value Ref Range    Troponin-I, Qt. <0.05 <0.05 ng/mL   GLUCOSE, POC    Collection Time: 07/17/18  9:56 PM   Result Value Ref Range    Glucose (POC) 203 (H) 65 - 100 mg/dL    Performed by Yane Rausch    METABOLIC PANEL, COMPREHENSIVE    Collection Time: 07/18/18  4:05 AM   Result Value Ref Range    Sodium 141 136 - 145 mmol/L    Potassium 4.5 3.5 - 5.1 mmol/L    Chloride 109 (H) 97 - 108 mmol/L    CO2 21 21 - 32 mmol/L    Anion gap 11 5 - 15 mmol/L    Glucose 189 (H) 65 - 100 mg/dL    BUN 18 6 - 20 MG/DL    Creatinine 1.08 (H) 0.55 - 1.02 MG/DL    BUN/Creatinine ratio 17 12 - 20      GFR est AA >60 >60 ml/min/1.73m2    GFR est non-AA 52 (L) >60 ml/min/1.73m2    Calcium 8.4 (L) 8.5 - 10.1 MG/DL    Bilirubin, total 0.3 0.2 - 1.0 MG/DL    ALT (SGPT) 19 12 - 78 U/L    AST (SGOT) 23 15 - 37 U/L    Alk. phosphatase 64 45 - 117 U/L    Protein, total 7.2 6.4 - 8.2 g/dL    Albumin 2.6 (L) 3.5 - 5.0 g/dL    Globulin 4.6 (H) 2.0 - 4.0 g/dL    A-G Ratio 0.6 (L) 1.1 - 2.2     CBC WITH AUTOMATED DIFF    Collection Time: 07/18/18  4:05 AM   Result Value Ref Range    WBC 7.3 3.6 - 11.0 K/uL    RBC 2.54 (L) 3.80 - 5.20 M/uL    HGB 7.5 (L) 11.5 - 16.0 g/dL    HCT 24.8 (L) 35.0 - 47.0 %    MCV 97.6 80.0 - 99.0 FL    MCH 29.5 26.0 - 34.0 PG    MCHC 30.2 30.0 - 36.5 g/dL    RDW 14.4 11.5 - 14.5 %    PLATELET 388 784 - 472 K/uL    MPV 9.8 8.9 - 12.9 FL    NRBC 0.0 0  WBC    ABSOLUTE NRBC 0.00 0.00 - 0.01 K/uL    NEUTROPHILS 71 32 - 75 %    LYMPHOCYTES 17 12 - 49 %    MONOCYTES 11 5 - 13 %    EOSINOPHILS 0 0 - 7 %    BASOPHILS 0 0 - 1 %    IMMATURE GRANULOCYTES 1 (H) 0.0 - 0.5 %    ABS. NEUTROPHILS 5.2 1.8 - 8.0 K/UL    ABS. LYMPHOCYTES 1.2 0.8 - 3.5 K/UL    ABS. MONOCYTES 0.8 0.0 - 1.0 K/UL    ABS. EOSINOPHILS 0.0 0.0 - 0.4 K/UL    ABS. BASOPHILS 0.0 0.0 - 0.1 K/UL    ABS. IMM. GRANS. 0.1 (H) 0.00 - 0.04 K/UL    DF AUTOMATED     TROPONIN I    Collection Time: 07/18/18  4:05 AM   Result Value Ref Range    Troponin-I, Qt. <0.05 <0.05 ng/mL   GLUCOSE, POC    Collection Time: 07/18/18  7:03 AM   Result Value Ref Range    Glucose (POC) 195 (H) 65 - 100 mg/dL    Performed by Dilan Cross           Radiology:        Current Facility-Administered Medications   Medication Dose Route Frequency    sodium chloride (NS) flush 5-10 mL  5-10 mL IntraVENous Q8H    sodium chloride (NS) flush 5-10 mL  5-10 mL IntraVENous PRN    HYDROcodone-acetaminophen (NORCO) 5-325 mg per tablet 1 Tab  1 Tab Oral Q4H PRN    morphine injection 2 mg  2 mg IntraVENous Q4H PRN    naloxone (NARCAN) injection 0.4 mg  0.4 mg IntraVENous PRN    diphenhydrAMINE (BENADRYL) injection 12.5 mg  12.5 mg IntraVENous Q4H PRN    ondansetron (ZOFRAN) injection 4 mg  4 mg IntraVENous Q4H PRN    docusate sodium (COLACE) capsule 100 mg  100 mg Oral DAILY    albuterol (PROVENTIL VENTOLIN) nebulizer solution 2.5 mg  2.5 mg Nebulization Q6H PRN    clonazePAM (KlonoPIN) tablet 0.5 mg  0.5 mg Oral QHS PRN    polyvinyl alcohol (LIQUIFILM TEARS) 1.4 % ophthalmic solution 1-2 Drop  1-2 Drop Both Eyes DIALYSIS PRN    pantoprazole (PROTONIX) tablet 40 mg  40 mg Oral DAILY    famotidine (PEPCID) tablet 20 mg  20 mg Oral DAILY    loratadine (CLARITIN) tablet 10 mg  10 mg Oral DAILY    fluticasone (FLONASE) 50 mcg/actuation nasal spray 2 Spray  2 Spray Both Nostrils DAILY    levothyroxine (SYNTHROID) tablet 150 mcg  150 mcg Oral ACB    . PHARMACY TO SUBSTITUTE PER PROTOCOL (Reordered from: LINZESS 145 mcg cap capsule)    Per Protocol    . PHARMACY TO SUBSTITUTE PER PROTOCOL (Reordered from: Liraglutide (VICTOZA) 0.6 mg/0.1 mL (18 mg/3 mL) saleem)    Per Protocol    white Petrolatum-Mineral Oil (AKWA TEARS) ophthalmic ointment   Both Eyes PRN    montelukast (SINGULAIR) tablet 10 mg  10 mg Oral QHS    sertraline (ZOLOFT) tablet 50 mg  50 mg Oral DAILY    insulin lispro (HUMALOG) injection   SubCUTAneous AC&HS    glucose chewable tablet 16 g  4 Tab Oral PRN    dextrose (D50W) injection syrg 12.5-25 g  12.5-25 g IntraVENous PRN    glucagon (GLUCAGEN) injection 1 mg  1 mg IntraMUSCular PRN    0.9% sodium chloride infusion  25 mL/hr IntraVENous CONTINUOUS    arformoterol (BROVANA) neb solution 15 mcg  15 mcg Nebulization BID RT    And    budesonide (PULMICORT) 500 mcg/2 ml nebulizer suspension  500 mcg Nebulization BID RT    enoxaparin (LOVENOX) injection 40 mg  40 mg SubCUTAneous Q24H    valsartan (DIOVAN) tablet 80 mg (Patient Supplied)  80 mg Oral DAILY       Devin Villeda M.D.      [facility] [address] [place] [address] 13, [address] [facility] [address]   FalconNena tierney   [phone]

## 2018-07-18 NOTE — PROGRESS NOTES
Bedside and Verbal shift change report given to Tressa Cowan and Fede Howard RN (oncoming nurse) by Jaime Mojica RN (offgoing nurse). Report included the following information SBAR, Kardex, MAR and Recent Results.

## 2018-07-18 NOTE — PROGRESS NOTES
Notified by  that patient needed to be given a Code 44; this was done and signed copies left on patient's bedside chart. Patient understood and signed.  [id number],[email or web address]

## 2018-07-18 NOTE — PROGRESS NOTES
Bedside shift change report given to Brice Jett (oncoming nurse) by Judith Vital (offgoing nurse). Report included the following information SBAR, Kardex, Intake/Output, MAR, Accordion and Recent Results.

## 2018-07-18 NOTE — PROGRESS NOTES
[facility]  [address] [facility] [address] [facility]  P [phone]  F [phone]       Patient: Tomas White  Admit Date: 7/17/2018  Admit Dx: Chest Pain  Chest pain  Ovarian cancer ([address]  SOB (shortness of breath)  Diabetes (HCC)  HTN (hypertension)  Obesity    Subjective:     No c/o. Feeling well. Denies febvers. Still mildy SOB with ambulation, occas cough. breathing tx help. Chest discomfort minimal, has not required any pain medication since her admit. She did have some chest discomfort on last visit d/t sternal tumor involvement and was using oxycodone. Cough in duration ~3 days. Objective: Intake/Output Summary (Last 24 hours) at 07/18/18 0735  Last data filed at 07/18/18 0400   Gross per 24 hour   Intake          1834.17 ml   Output                0 ml   Net          1834.17 ml       Physical Exam  /68 (BP 1 Location: Right arm, BP Patient Position: At rest)  Pulse 69  Temp 98.1 °F (36.7 °C)  Resp 16  Wt 305 lb 12.8 oz (138.7 kg)  SpO2 93%  BMI 45.29 kg/m2     General:  alert, cooperative, no distress     Cardiac:  Regular rate and rhythm        Lungs:  clear to auscultation bilaterally. Chest wall palpable parasternal lesions. Abdomen:  soft, nondistended, nontender   Extremity: no edema      Data Review  Lab Results   Component Value Date/Time    WBC 7.3 07/18/2018 04:05 AM    ABS.  NEUTROPHILS 5.2 07/18/2018 04:05 AM    HGB 7.5 (L) 07/18/2018 04:05 AM    HCT 24.8 (L) 07/18/2018 04:05 AM    MCV 97.6 07/18/2018 04:05 AM    MCH 29.5 07/18/2018 04:05 AM    PLATELET 899 43/82/5524 04:05 AM     Lab Results   Component Value Date/Time    Sodium 141 07/18/2018 04:05 AM    Potassium 4.5 07/18/2018 04:05 AM    Chloride 109 (H) 07/18/2018 04:05 AM    CO2 21 07/18/2018 04:05 AM    Glucose 189 (H) 07/18/2018 04:05 AM    BUN 18 07/18/2018 04:05 AM    Creatinine 1.08 (H) 07/18/2018 04:05 AM    Calcium 8.4 (L) 07/18/2018 04:05 AM    Albumin 2.6 (L) 07/18/2018 04:05 AM    Bilirubin, total 0.3 07/18/2018 04:05 AM    AST (SGOT) 23 07/18/2018 04:05 AM    ALT (SGPT) 19 07/18/2018 04:05 AM    Alk. phosphatase 64 07/18/2018 04:05 AM         Assessment/Plan:     Admitted for Chest Pain  Chest pain  Ovarian cancer ([address]  SOB (shortness of breath)  Diabetes ([address]  HTN (hypertension)  Obesity    Active Hospital Problems    Diagnosis Date Noted    Diabetes ([address] 07/17/2018    Chest pain 07/17/2018    HTN (hypertension) 07/17/2018    SOB (shortness of breath) 07/17/2018    Obesity 07/17/2018    Acquired hypothyroidism 07/17/2018    Anemia 07/17/2018    Contrast media allergy 07/09/2018    On antineoplastic chemotherapy 05/31/2018    Reactive depression 05/31/2018    Stage 1 chronic kidney disease 05/31/2018    Chest mass 04/10/2018    Chest wall mass 04/10/2018    Type 2 diabetes mellitus with nephropathy ([address] 12/26/2017    Lymphadenopathy, mediastinal 07/18/2017    Pulmonary hypertension, mild ([address] 03/28/2017    Angina pectoris associated with type 2 diabetes mellitus ([address] 02/15/2017    Ovarian cancer ([address] 04/28/2015    Chest pain, atypical 01/18/2015    Hx of pulmonary embolus during pregnancy 01/18/2015    Benign essential hypertension 04/04/2014    Dyslipidemia (high LDL; low HDL)     Sleep apnea 02/28/2014    Carcinomatosis ([address] 09/22/2012    Controlled type 2 diabetes mellitus without complication, with long-term current use of insulin ([address] 09/22/2012    Morbid obesity ([address] 09/22/2012     65 y.o. AA with history of FIGO stage IIIC high grade serous ovarian cancer dating back to Sept 2012, BRCA germ line negative s/p primary Taxol/carbo with retreatment in 2014 and Doxil/Avastin, topotecan, gemzar and now single agent weekly Taxol initiated 5/8/18 following progression with chest wall involvement. Last chemo July 10th. Onc: widely known metastatic thoracic disease.  Improving  and palpable lesions on exam  Heme/CV: Hemodynamically stable. Anemia chronic disease/chemotherapy. Holding transfusion, ACS essentially ruled out, troponins remain normal. Cards evaluated. Pulm: stable, improved with RT. Renal: stable  FEN/GI: advance DM diet  ID/Wound: afeb  Neuro: pain controlled w/o analgesic requirement  PPX: lovenox, ambulate  Disposition: Stable.  Continue to monitor today      MALLIKA Rosas

## 2018-07-18 NOTE — PROGRESS NOTES
Spiritual Care Partner Volunteer visited patient in Rm [id number] on 7/18/2018.   Documented by:  Chaplain Pitts MDiv, MS, [facility]  [phone])

## 2018-07-19 ENCOUNTER — TELEPHONE (OUTPATIENT)
Dept: GYNECOLOGY | Age: 58
End: 2018-07-19

## 2018-07-19 VITALS
SYSTOLIC BLOOD PRESSURE: 144 MMHG | HEART RATE: 90 BPM | OXYGEN SATURATION: 95 % | RESPIRATION RATE: 15 BRPM | DIASTOLIC BLOOD PRESSURE: 82 MMHG | BODY MASS INDEX: 45.29 KG/M2 | WEIGHT: 293 LBS | TEMPERATURE: 98.1 F

## 2018-07-19 LAB
BASOPHILS # BLD: 0.1 K/UL (ref 0–0.1)
BASOPHILS NFR BLD: 1 % (ref 0–1)
DIFFERENTIAL METHOD BLD: ABNORMAL
EOSINOPHIL # BLD: 0.2 K/UL (ref 0–0.4)
EOSINOPHIL NFR BLD: 3 % (ref 0–7)
ERYTHROCYTE [DISTWIDTH] IN BLOOD BY AUTOMATED COUNT: 14.6 % (ref 11.5–14.5)
GLUCOSE BLD STRIP.AUTO-MCNC: 113 MG/DL (ref 65–100)
GLUCOSE BLD STRIP.AUTO-MCNC: 123 MG/DL (ref 65–100)
HCT VFR BLD AUTO: 24.4 % (ref 35–47)
HGB BLD-MCNC: 7.4 G/DL (ref 11.5–16)
IMM GRANULOCYTES # BLD: 0.1 K/UL (ref 0–0.04)
IMM GRANULOCYTES NFR BLD AUTO: 1 % (ref 0–0.5)
LYMPHOCYTES # BLD: 1.7 K/UL (ref 0.8–3.5)
LYMPHOCYTES NFR BLD: 24 % (ref 12–49)
MCH RBC QN AUTO: 29.7 PG (ref 26–34)
MCHC RBC AUTO-ENTMCNC: 30.3 G/DL (ref 30–36.5)
MCV RBC AUTO: 98 FL (ref 80–99)
MONOCYTES # BLD: 1.1 K/UL (ref 0–1)
MONOCYTES NFR BLD: 16 % (ref 5–13)
NEUTS SEG # BLD: 4 K/UL (ref 1.8–8)
NEUTS SEG NFR BLD: 56 % (ref 32–75)
NRBC # BLD: 0 K/UL (ref 0–0.01)
NRBC BLD-RTO: 0 PER 100 WBC
PLATELET # BLD AUTO: 173 K/UL (ref 150–400)
PMV BLD AUTO: 9.5 FL (ref 8.9–12.9)
RBC # BLD AUTO: 2.49 M/UL (ref 3.8–5.2)
SERVICE CMNT-IMP: ABNORMAL
SERVICE CMNT-IMP: ABNORMAL
WBC # BLD AUTO: 7.1 K/UL (ref 3.6–11)

## 2018-07-19 PROCEDURE — 74011000250 HC RX REV CODE- 250: Performed by: PHYSICIAN ASSISTANT

## 2018-07-19 PROCEDURE — 94640 AIRWAY INHALATION TREATMENT: CPT

## 2018-07-19 PROCEDURE — 85025 COMPLETE CBC W/AUTO DIFF WBC: CPT | Performed by: NURSE PRACTITIONER

## 2018-07-19 PROCEDURE — 96372 THER/PROPH/DIAG INJ SC/IM: CPT

## 2018-07-19 PROCEDURE — 82962 GLUCOSE BLOOD TEST: CPT

## 2018-07-19 PROCEDURE — 36415 COLL VENOUS BLD VENIPUNCTURE: CPT | Performed by: NURSE PRACTITIONER

## 2018-07-19 PROCEDURE — 74011250636 HC RX REV CODE- 250/636: Performed by: PHYSICIAN ASSISTANT

## 2018-07-19 PROCEDURE — 99218 HC RM OBSERVATION: CPT

## 2018-07-19 PROCEDURE — 74011250636 HC RX REV CODE- 250/636: Performed by: NURSE PRACTITIONER

## 2018-07-19 PROCEDURE — 74011250637 HC RX REV CODE- 250/637: Performed by: NURSE PRACTITIONER

## 2018-07-19 PROCEDURE — 74011000250 HC RX REV CODE- 250: Performed by: NURSE PRACTITIONER

## 2018-07-19 PROCEDURE — 74011250637 HC RX REV CODE- 250/637: Performed by: PHYSICIAN ASSISTANT

## 2018-07-19 RX ORDER — POLYMYXIN B SULFATE AND TRIMETHOPRIM 1; 10000 MG/ML; [USP'U]/ML
1 SOLUTION OPHTHALMIC EVERY 4 HOURS
Qty: 1 BOTTLE | Refills: 0 | Status: SHIPPED | OUTPATIENT
Start: 2018-07-19 | End: 2018-07-26

## 2018-07-19 RX ORDER — CROMOLYN SODIUM 5.2 MG/ML
1 SPRAY, METERED NASAL 4 TIMES DAILY
Status: DISCONTINUED | OUTPATIENT
Start: 2018-07-19 | End: 2018-07-19 | Stop reason: HOSPADM

## 2018-07-19 RX ORDER — HEPARIN 100 UNIT/ML
300 SYRINGE INTRAVENOUS AS NEEDED
Status: DISCONTINUED | OUTPATIENT
Start: 2018-07-19 | End: 2018-07-19 | Stop reason: HOSPADM

## 2018-07-19 RX ORDER — POLYMYXIN B SULFATE AND TRIMETHOPRIM 1; 10000 MG/ML; [USP'U]/ML
1 SOLUTION OPHTHALMIC EVERY 4 HOURS
Status: DISCONTINUED | OUTPATIENT
Start: 2018-07-19 | End: 2018-07-19 | Stop reason: HOSPADM

## 2018-07-19 RX ORDER — CODEINE PHOSPHATE AND GUAIFENESIN 10; 100 MG/5ML; MG/5ML
10 SOLUTION ORAL
Qty: 1 BOTTLE | Refills: 0 | Status: SHIPPED | OUTPATIENT
Start: 2018-07-19 | End: 2018-08-13

## 2018-07-19 RX ORDER — CROMOLYN SODIUM 5.2 MG/ML
1 SPRAY, METERED NASAL 4 TIMES DAILY
Qty: 13 ML | Refills: 1 | Status: SHIPPED | OUTPATIENT
Start: 2018-07-19 | End: 2018-11-27

## 2018-07-19 RX ORDER — FEXOFENADINE HCL AND PSEUDOEPHEDRINE HCI 180; 240 MG/1; MG/1
1 TABLET, EXTENDED RELEASE ORAL DAILY
Qty: 4 TAB | Refills: 0 | Status: SHIPPED | OUTPATIENT
Start: 2018-07-19 | End: 2018-07-23

## 2018-07-19 RX ADMIN — HEPARIN 300 UNITS: 100 SYRINGE at 13:32

## 2018-07-19 RX ADMIN — VALSARTAN 80 MG: 80 TABLET ORAL at 09:33

## 2018-07-19 RX ADMIN — FLUTICASONE PROPIONATE 2 SPRAY: 50 SPRAY, METERED NASAL at 09:34

## 2018-07-19 RX ADMIN — DIPHENHYDRAMINE HYDROCHLORIDE 25 MG: 25 CAPSULE ORAL at 05:18

## 2018-07-19 RX ADMIN — CROMOLYN SODIUM 1 SPRAY: 5.2 SPRAY, METERED NASAL at 09:35

## 2018-07-19 RX ADMIN — BUDESONIDE 500 MCG: 0.5 INHALANT RESPIRATORY (INHALATION) at 07:31

## 2018-07-19 RX ADMIN — Medication 10 ML: at 05:19

## 2018-07-19 RX ADMIN — GUAIFENESIN AND CODEINE PHOSPHATE 10 ML: 100; 10 SOLUTION ORAL at 12:37

## 2018-07-19 RX ADMIN — PANTOPRAZOLE SODIUM 40 MG: 40 TABLET, DELAYED RELEASE ORAL at 09:33

## 2018-07-19 RX ADMIN — SERTRALINE HYDROCHLORIDE 50 MG: 50 TABLET ORAL at 09:33

## 2018-07-19 RX ADMIN — ENOXAPARIN SODIUM 40 MG: 40 INJECTION, SOLUTION INTRAVENOUS; SUBCUTANEOUS at 07:00

## 2018-07-19 RX ADMIN — FAMOTIDINE 20 MG: 20 TABLET ORAL at 09:33

## 2018-07-19 RX ADMIN — ARFORMOTEROL TARTRATE 15 MCG: 15 SOLUTION RESPIRATORY (INHALATION) at 07:31

## 2018-07-19 RX ADMIN — LEVOTHYROXINE SODIUM 150 MCG: 75 TABLET ORAL at 07:00

## 2018-07-19 RX ADMIN — SODIUM CHLORIDE 25 ML/HR: 900 INJECTION, SOLUTION INTRAVENOUS at 01:27

## 2018-07-19 RX ADMIN — LORATADINE, PSEUDOEPHEDRINE 1 TABLET: 5; 120 TABLET, EXTENDED RELEASE ORAL at 09:34

## 2018-07-19 RX ADMIN — DOCUSATE SODIUM 100 MG: 100 CAPSULE, LIQUID FILLED ORAL at 09:33

## 2018-07-19 RX ADMIN — GUAIFENESIN AND CODEINE PHOSPHATE 10 ML: 100; 10 SOLUTION ORAL at 05:18

## 2018-07-19 RX ADMIN — POLYMYXIN B SULFATE, TRIMETHOPRIM SULFATE 1 DROP: 10000; 1 SOLUTION/ DROPS OPHTHALMIC at 09:35

## 2018-07-19 NOTE — PROGRESS NOTES
[facility]  [address] [facility] [address], [address] [facility]  P [phone]  F [phone]       Patient: Citlaly Cruz  Admit Date: 7/17/2018  Admit Dx: Chest Pain  Chest pain  Ovarian cancer ([address]  SOB (shortness of breath)  Diabetes (HCC)  HTN (hypertension)  Obesity  Hypertension  SOB (shortness of breath)  Diabetes (HCC)    Subjective:     Awoke with right eye discharge/crusting/swelling. No visual changes. Otherwise feeling well, throat pruritus improved. Still mild ear fullness. Cough/breathing improved. Chest discomfort minimal, has not required any pain medication since her admit. She did have some chest discomfort on last visit d/t sternal tumor involvement and was using oxycodone. Cough in duration ~3 days. Objective: Intake/Output Summary (Last 24 hours) at 07/19/18 0724  Last data filed at 07/19/18 0529   Gross per 24 hour   Intake           1067.5 ml   Output             3000 ml   Net          -1932.5 ml       Physical Exam  /68 (BP 1 Location: Right arm, BP Patient Position: At rest)  Pulse 63  Temp 98.1 °F (36.7 °C)  Resp 16  Wt 305 lb 12.8 oz (138.7 kg)  SpO2 95%  BMI 45.29 kg/m2     General:  alert, cooperative, no distress. Right eye periorbital mild edema/erythema injected sclera. Cardiac:  Regular rate and rhythm        Lungs:  clear to auscultation bilaterally. Chest wall palpable parasternal lesions. Abdomen:  soft, nondistended, nontender   Extremity: no edema        Data Review  Lab Results   Component Value Date/Time    WBC 7.1 07/19/2018 05:24 AM    ABS.  NEUTROPHILS 4.0 07/19/2018 05:24 AM    HGB 7.4 (L) 07/19/2018 05:24 AM    HCT 24.4 (L) 07/19/2018 05:24 AM    MCV 98.0 07/19/2018 05:24 AM    MCH 29.7 07/19/2018 05:24 AM    PLATELET 818 99/35/0473 05:24 AM     Lab Results   Component Value Date/Time    Sodium 141 07/18/2018 04:05 AM    Potassium 4.5 07/18/2018 04:05 AM    Chloride 109 (H) 07/18/2018 04:05 AM CO2 21 07/18/2018 04:05 AM    Glucose 189 (H) 07/18/2018 04:05 AM    BUN 18 07/18/2018 04:05 AM    Creatinine 1.08 (H) 07/18/2018 04:05 AM    Calcium 8.4 (L) 07/18/2018 04:05 AM    Albumin 2.6 (L) 07/18/2018 04:05 AM    Bilirubin, total 0.3 07/18/2018 04:05 AM    AST (SGOT) 23 07/18/2018 04:05 AM    ALT (SGPT) 19 07/18/2018 04:05 AM    Alk. phosphatase 64 07/18/2018 04:05 AM         Assessment/Plan:     Admitted for Chest Pain  Chest pain  Ovarian cancer ([address]  SOB (shortness of breath)  Diabetes ([address]  HTN (hypertension)  Obesity  Hypertension  SOB (shortness of breath)  Diabetes ([address]    Active Hospital Problems    Diagnosis Date Noted    Hypertension 07/18/2018    Pain due to malignant neoplasm metastatic to bone ([address] 07/18/2018     Mediastinal chest wall mass      Diabetes ([address] 07/17/2018    SOB (shortness of breath) 07/17/2018    Acquired hypothyroidism 07/17/2018    Anemia 07/17/2018    Contrast media allergy 07/09/2018    On antineoplastic chemotherapy 05/31/2018    Reactive depression 05/31/2018    Stage 1 chronic kidney disease 05/31/2018    Chest mass 04/10/2018    Chest wall mass 04/10/2018    Type 2 diabetes mellitus with nephropathy ([address] 12/26/2017    Lymphadenopathy, mediastinal 07/18/2017    Pulmonary hypertension, mild ([address] 03/28/2017    Angina pectoris associated with type 2 diabetes mellitus ([address] 02/15/2017    Hyperglycemia due to type 2 diabetes mellitus ([address] 02/15/2017    Ovarian cancer ([address] 04/28/2015    Chest pain, atypical 01/18/2015    Benign essential hypertension 04/04/2014    Dyslipidemia (high LDL; low HDL)     Sleep apnea 02/28/2014    Carcinomatosis ([address] 09/22/2012    Controlled type 2 diabetes mellitus without complication, with long-term current use of insulin ([address] 09/22/2012    Morbid obesity ([address] 09/22/2012     65 y.o.  AA with history of FIGO stage IIIC high grade serous ovarian cancer dating back to Sept 2012, BRCA germ line negative s/p primary Taxol/carbo with retreatment in 2014 and Doxil/Avastin, topotecan, gemzar and now single agent weekly Taxol initiated 5/8/18 following progression with chest wall involvement. Last chemo July 10th. Onc: widely known metastatic thoracic disease. Improving  and palpable lesions on exam. Addendum to CT to eval for therapeutic response is pending. Heme/CV: Hemodynamically stable. Anemia chronic disease/chemotherapy. Holding transfusion, ACS essentially ruled out, troponins remain normal. Cards evaluated. Pulm: improved overall. CXR no acute findings. ? URI and now conjunctivitis, allergic/viral component. D/t patient factors treat conjunctivitis. Overall sx improved. Renal: stable  FEN/GI: DM diet,   Endo:continue monitor glucose control. Resume hypothyroid home meds  ID/Wound: afeb  Neuro: pain controlled w/o analgesic requirement  PPX: lovenox, ambulate  Disposition: Stable. Possible DC today. F/u Dr. Jaime Galaviz next tuesday. Directed f/u with PCP for resolution of respiratory sx on discharge.       MALLIKA Oliveros

## 2018-07-19 NOTE — TELEPHONE ENCOUNTER
S/w Rosy Blow, ok to cancel Ct for Monday 7/23/18, I also called pt to advise Ct will be canceled and for her to keep her appt with Dr. Nba Gutierrez on 7/24/18 at 2:30 pm

## 2018-07-19 NOTE — DISCHARGE SUMMARY
[address], [address], [address]  P [phone]  F [phone]        Discharge Summary  Patient ID:  Cecilia Ybarra  [id number]  65 y.o.  1960    Admit date: 7/17/2018    PCP: Chayo Gamboa MD    Admit MD: Mark Hilario MD    Discharge MD: Mark Hilario MD    Discharge date and time: 7/19/2018    Admission Diagnoses:     Chest Pain  Chest pain  Ovarian cancer ([address]  SOB (shortness of breath)  Diabetes ([address]  HTN (hypertension)  Obesity  Hypertension  SOB (shortness of breath)  Diabetes ([address]  Patient Active Problem List   Diagnosis Code    Carcinomatosis ([address] C80.0    Controlled type 2 diabetes mellitus without complication, with long-term current use of insulin (HCC) E11.9, Z79.4    Morbid obesity ([address] E66.01    Abnormal mammogram R92.8    Sleep apnea G47.30    Dyslipidemia (high LDL; low HDL) E78.5    Benign essential hypertension I10    CINV (chemotherapy-induced nausea and vomiting) R11.2, T45.1X5A    Portacath in place--left Z95.828    Chest pain, atypical R07.89    Ovarian cancer (HCC) C56.9    Hyperglycemia due to type 2 diabetes mellitus ([address] E11.65    Angina pectoris associated with type 2 diabetes mellitus (HCC) E11.59, I20.9    Renal calculi N20.0    Pulmonary hypertension, mild (HCC) I27.20    Lymphadenopathy, mediastinal R59.0    Type 2 diabetes mellitus with nephropathy (HCC) E11.21    Chest mass R22.2    Chest wall mass R22.2    On antineoplastic chemotherapy Z79.899    Reactive depression F32.9    Stage 1 chronic kidney disease N18.1    Contrast media allergy Z91.041    Diabetes (HCC) E11.9    SOB (shortness of breath) R06.02    Acquired hypothyroidism E03.9    Anemia D64.9    Hypertension I10    Pain due to malignant neoplasm metastatic to bone ([address] G89.3, C79.51       Discharge Diagnoses:    Same as above      Hospital Course:   65 y.o. dx with stage IIIC ovarian cancer in Sept 2012 s/p MARAH/BSO/Cytoreductive surgery on 9/21/12. She recieved 6 cycles of Carbo/Taxol with CR. She reoccurred in fall of 2014 treated with 3 cycles of Carbo/Taxol before having a reaction to Carbo, continued her next 3 cycles with Doxil/Avastin. CT w/o reduction in disease. She restarted treatment with Topotecan for 11 cycles (July 2015 - March 2016). She was followed by Dr. Simi Cisse and in Jan 2017, she had a CT scan that showed recurrence of disease. She was given Gemzar with good results. In April 2018, she presented to the [facility] ER with c/o CP and a Chest CT showed chest wall mass in the right parasternal region that invades the sternum with \"massive adenopathy in the mediastinum and increasing adenopathy in the retrocrural region and upper abd. CT guided bx of chest wall mass was performed that showed Metastatic high-grade serous carcinoma   She has been treated with weekly Taxol from 5/8/18 - most recently 7/10/18. She presented to [facility] ER with c/o SOB and chest pain. Cardiac w/u neg for ACS. She was treated with inhalers, steroid and sx mgmt. CT of chest, abd pelvis continues to show extensive disease, though stable to mildly improved. She recovered well, dyspnea improved with cough/allergy treatments. Chest pain related to tumor infiltration tolerated without analgesic requirements. On day of discharge right eye crusted, injected, treated for presumptive conjunctivitis, antibiotics provided. F/u for treatment response and palliative chemotherapy continuation scheduled. F/u with PCP for cold/URI/allergy symptom control. CT Results (most recent):    Results from Hospital Encounter encounter on 07/17/18   CT ABD PELV W CONT   Narrative INDICATION: Chest pain. EXAM: Preliminary  images were obtained. Multiple contiguous helically acquired images were obtained through the thorax  following intravenous contrast 100 ccs administration.  Post contrast images were  obtained through the abdomen and pelvis. Steroid prep was performed for history  of rash with contrast aspiration. 3D post processing was performed. Coronal and sagittal reconstructions were performed. CT dose reduction was achieved through the use of a standardized protocol  tailored for this examination and automatic exposure control for dose  modulation. FINDINGS: Images through the pulmonary vessels reveal no filling defects or  meniscus to suggest pulmonary embolic process. Images through the mediastinum reveal extensive confluent mediastinal  lymphadenopathy. A mass lesion is present at the sternoclavicular junction  region measuring roughly 4.3 x 3.9 cm in size. Images through the lungs reveal scattered subsegmental atelectasis but no  confluent airspace disease or significant pleural effusion or parenchymal mass  lesion. Right cardiophrenic angle lymphadenopathy. Images through the abdomen demonstrate extensive massive celiac and rocio  hepatis and portacaval lymphadenopathy. Dominant mass in the rocio hepatis  region measures roughly 7.1 x 5.3 cm in size. Scattered hepatic parenchymal  hypodensities are present and are nonspecific. Spleen is within normal limits. Adrenal glands are within normal limits. Bilateral renal function. Tiny  bilateral nonobstructing renal calculi. Retroperitoneal lymphadenopathy at the level of the right kidney measures 4.9 x  3.5 cm. Omental mass in the anterior midabdomen measures roughly 5.6 x 3.0 cm. Images through the abdomen and pelvis reveal numerous peritoneal and  retroperitoneal mass lesions. In the pelvis, left external iliac adenopathy  noted measures 4.8 x 2.9 cm. There is a cul-de-sac pelvic mass measuring roughly  5.0 cm in size. Right groin lymphadenopathy. Impression IMPRESSION:     Extensive confluent mediastinal and right cardiophrenic angle lymphadenopathy. 4.3 x 3.9 cm mass lesion on the right at the sternoclavicular junction.      No evidence of pulmonary embolism. Hepatomegaly. Extensive retroperitoneal, peritoneal, and pelvic mass lesions. Findings most consistent with widespread malignancy. Multiple lesions amenable for CT-guided biopsy if indicated clinically. Pathology: na    Consults: Cardiology    Significant Diagnostic Studies:  Lab Results   Component Value Date/Time    WBC 7.1 07/19/2018 05:24 AM    ABS. NEUTROPHILS 4.0 07/19/2018 05:24 AM    HGB 7.4 (L) 07/19/2018 05:24 AM    HCT 24.4 (L) 07/19/2018 05:24 AM    MCV 98.0 07/19/2018 05:24 AM    MCH 29.7 07/19/2018 05:24 AM    PLATELET 924 67/11/4133 05:24 AM     Lab Results   Component Value Date/Time    Sodium 141 07/18/2018 04:05 AM    Potassium 4.5 07/18/2018 04:05 AM    Chloride 109 (H) 07/18/2018 04:05 AM    CO2 21 07/18/2018 04:05 AM    Glucose 189 (H) 07/18/2018 04:05 AM    BUN 18 07/18/2018 04:05 AM    Creatinine 1.08 (H) 07/18/2018 04:05 AM    Calcium 8.4 (L) 07/18/2018 04:05 AM    Albumin 2.6 (L) 07/18/2018 04:05 AM    Bilirubin, total 0.3 07/18/2018 04:05 AM    AST (SGOT) 23 07/18/2018 04:05 AM    ALT (SGPT) 19 07/18/2018 04:05 AM    Alk. phosphatase 64 07/18/2018 04:05 AM       Condition on Discharge: good    Disposition: home    Patient Instructions:   Current Discharge Medication List      START taking these medications    Details   cromolyn (NASALCROM) 5.2 mg/spray (4 %) nasal spray 1 [place] by Both Nostrils route four (4) times daily. Qty: 13 mL, Refills: 1      guaiFENesin-codeine (ROBITUSSIN AC) 100-10 mg/5 mL solution Take 10 mL by mouth four (4) times daily as needed for Cough or Congestion. Max Daily Amount: 40 mL. Qty: 1 Bottle, Refills: 0    Associated Diagnoses: Cough      trimethoprim-polymyxin b (POLYTRIM) ophthalmic solution Administer 1 Drop to right eye every four (4) hours for 7 days.  Indications: BACTERIAL CONJUNCTIVITIS  Qty: 1 Bottle, Refills: 0      fexofenadine-pseudoephedrine (ALLEGRA-D 24) 180-240 mg per tablet Take 1 Tab by mouth daily for 4 days. Qty: 4 Tab, Refills: 0         CONTINUE these medications which have NOT CHANGED    Details   omega-3 acid ethyl esters (LOVAZA) 1 gram capsule TAKE ONE CAPSULE BY MOUTH TWICE A DAY  Qty: 180 Cap, Refills: 2      LINZESS 145 mcg cap capsule TAKE 1 CAPSULE BY MOUTH EVERY DAY  Qty: 30 Cap, Refills: 0      montelukast (SINGULAIR) 10 mg tablet TAKE 1 TAB BY MOUTH DAILY. Qty: 90 Tab, Refills: 1    Associated Diagnoses: Other seasonal allergic rhinitis      Dexlansoprazole (DEXILANT) 60 mg CpDB Take 1 Cap by mouth daily. Qty: 90 Cap, Refills: 3    Associated Diagnoses: Gastroesophageal reflux disease, esophagitis presence not specified      lidocaine-prilocaine (EMLA) topical cream Apply small amount over port area one hour before chemo treatment and cover with a Band-Aid  Qty: 30 g, Refills: 1      HYDROcodone-acetaminophen (NORCO) 5-325 mg per tablet Take 1 Tab by mouth every four (4) hours as needed for Pain. Max Daily Amount: 6 Tabs. Qty: 30 Tab, Refills: 0    Associated Diagnoses: Chest pain, atypical      levothyroxine (SYNTHROID) 150 mcg tablet TAKE 1 TAB BY MOUTH DAILY (BEFORE BREAKFAST). --SRLO [phone] FOR APPOINTMENT FOR MORE REFILLS  Qty: 90 Tab, Refills: 0    Associated Diagnoses: Acquired hypothyroidism      Liraglutide (VICTOZA) 0.6 mg/0.1 mL (18 mg/3 mL) pnij 1.8 mg by SubCUTAneous route daily (with lunch). Qty: 3 Pen, Refills: 3    Associated Diagnoses: Type 2 diabetes mellitus with hyperglycemia, with long-term current use of insulin ([facility])      insulin detemir (LEVEMIR FLEXTOUCH) 100 unit/mL (3 mL) inpn INJECT 30 UNITS IN AM AND 50 UNITS AT NIGHT.  INCREASE AS DIRECTED TO  UNITS PER DAY--CALL [phone] FOR APPOINTMENT FOR MORE REFILLS  Qty: 30 mL, Refills: 3    Associated Diagnoses: Type 2 diabetes mellitus with hyperglycemia, with long-term current use of insulin (HCC)      insulin aspart (NOVOLOG FLEXPEN) 100 unit/mL inpn INJECT 20 UNITS BEFORE EACH MEAL + 4 UNITS FOR EVERY 50 MG/DL ABOVE 150 MG/DL.  UNITS PER DAY  Qty: 30 mL, Refills: 11    Associated Diagnoses: Type 2 diabetes mellitus with hyperglycemia, with long-term current use of insulin (HCC)      potassium chloride (KLOR-CON M10) 10 mEq tablet TAKE 1 TAB BY MOUTH TWO (2) DAYS A WEEK. WED AND FRI  Qty: 90 Tab, Refills: 1    Associated Diagnoses: Edema, unspecified type      albuterol (PROVENTIL VENTOLIN) 2.5 mg /3 mL (0.083 %) nebulizer solution USE 1 VAIL VIA NEBULIZER EVERY 4 HOURS AS NEEDED FOR WHEEZING  Qty: 3 Package, Refills: 1      ondansetron (ZOFRAN ODT) 8 mg disintegrating tablet Take 1 Tab by mouth every eight (8) hours as needed for Nausea. Qty: 30 Tab, Refills: 3    Associated Diagnoses: Chemotherapy-induced nausea      fluticasone (FLONASE) 50 mcg/actuation nasal spray 2 Sprays by Both Nostrils route daily as needed. Associated Diagnoses: Carcinomatosis ([address] Morbid obesity due to excess calories ([address] Ovarian cancer, unspecified laterality ([address] Renal calculi; Acute cystitis without hematuria; Elevated CA-125      clonazePAM (KLONOPIN) 0.5 mg tablet Take 0.5 mg by mouth nightly as needed. sertraline (ZOLOFT) 50 mg tablet TAKE 1 TABLET BY MOUTH EVERY MORNING  Refills: 1      cholecalciferol, VITAMIN D3, (VITAMIN D3) 5,000 unit tab tablet Take 5,000 Units by mouth daily. furosemide (LASIX) 40 mg tablet Take 40 mg by mouth daily as needed (edema; do not exceed 2-3 doses per week). Refills: 3      valsartan (DIOVAN) 80 mg tablet TAKE 1 TAB BY MOUTH DAILY. Qty: 90 Tab, Refills: 2    Associated Diagnoses: Essential hypertension      SYMBICORT 160-4.5 mcg/actuation HFAA Take 2 Puffs by inhalation two (2) times daily as needed.   Qty: 2 Inhaler, Refills: 3         STOP taking these medications       fexofenadine (ALLEGRA) 180 mg tablet Comments:   Reason for Stopping:             Activity: no restrictions  Walk four or more times daily  Avoid allergens  Monitor respiratory symptoms  Stool softner for constipation  Diet: Diabetic Diet and Encourage fluids      Follow-up with Ankita Rivas MD in 5 days. Follow-up with your PCP's office, Stoney Villanueva MD in 5 days or sooner to evaluate your upper respiratory symptoms, allergies or sooner if they fail to resolve or worsen.     Signed:  MALLIKA Davis  7/19/2018/9:06 AM

## 2018-07-19 NOTE — PROGRESS NOTES
Bedside and Verbal shift change report given to Freddy Davila RN (oncoming nurse) by Audie Gomez RN (offgoing nurse). Report included the following information SBAR, Kardex, Intake/Output, MAR and Recent Results.

## 2018-07-19 NOTE — PROGRESS NOTES
Bedside and Verbal shift change report given to Gorge Mensah (oncoming nurse) by Jesus Leach RN (offgoing nurse). Report included the following information SBAR, Kardex, Intake/Output, MAR, Accordion and Recent Results.

## 2018-07-19 NOTE — DISCHARGE INSTRUCTIONS
[facility] GYNECOLOGIC ONCOLOGY  [address] [facility], [address], [address] [phone]  F [phone]     [address] [facility],      Please review your instructions with your nurse and ask any questions so you have all the information you need to recover well at home. If you do not feel you have everything you need to succeed and be safe after you leave the hospital, please discuss these concerns with your nurse. As always, call for any questions at home. Your doctor: Trung Garrison MD  Diagnosis:   Chest pain  Ovarian cancer ([facility]  SOB (shortness of breath)  Diabetes ([facility]  HTN (hypertension)  Cough, conjunctivitis  Date of Discharge: 7/19/2018       Take Home Medications     See Discharge Medication Review provided to you by your nurse. If you did not receive one, request this prior to your discharge. · It is important that you take your medications as they are prescribed. · Keep your medications in the bottles provided by the pharmacist and keep a list of the medication names, dosages, times to be taken and what they are for in your wallet. · Do not take other medications without consulting your doctor. · You should take a daily gentle stool softener such as a colace pill or dulcolax suppository for constipation as this is not uncommon after surgery and/or while on pain medication. If not prescribed, this can be found over the counter. If constipation persists for >24 hours you should take a dose of Milk of Magnesia. Call if your constipation continues. · Continue cough medications, avoid allergens. While taking Allegra-D, do not take your home medication of allegra. You may resume after completion of therapy. · Continue right eye drops and monitor for resolution of your conjunctival symptoms. Continue cool compresses daily      Diet    · Stay hydrated and drink fluids such as gatorade and water.  This will also help prevent constipation and dehydration. Limit somewhat any usual caffeine intake of beverages such as soda, tea and coffee as this may serve to dehydrate you. · Consider a nutritional supplement at home if you have a reduced appetite or experience unintended weight loss. · If nauseated, keep your diet limited to liquids and call if this persists. Activity    · If possible, have someone with you at all times until you feel stable. · Gradually increase your activity each day. There are generally no restrictions on walking, climbing stairs or riding in a car. Try to walk at least 4 times per day. Causes For Concern    If any of the following occur, please call our office and speak with the Nurse/aid who will help you with your problem or ask the doctor to call you. Problems with the incision, including increasing pain, swelling, redness or drainage. Increasing abdominal pain despite medication  Persisting nausea or vomiting. Fever or chills and a temperature >101F  Constipation (no bowel movement for three days). Diarrhea (more than three watery stools within 24 hours). Excessive vaginal or wound bleeding. If after hours and you cannot reach an on-call physician, call 911. Follow-Up    Keep your appt with Dr. Laurinda Severance as scheduled. Follow-up with your PCP's office, Gabriel Sofia MD in 5 days or sooner to evaluate your upper respiratory symptoms, allergies or sooner if they fail to resolve or worsen. Information obtained by :  I understand that if any problems occur once I am at home I am to contact my physician. I understand and acknowledge receipt of the instructions indicated above.                                                                                                                                            Physician's or R.N.'s Signature                                                                  Date/Time Patient or Representative Signature                                                          Date/Time      [employer] Activation    Thank you for requesting access to [employer]. Please follow the instructions below to securely access and download your online medical record. [employer] allows you to send messages to your doctor, view your test results, renew your prescriptions, schedule appointments, and more. How Do I Sign Up? 1. In your internet browser, go to [email or web address]  2. Click on the First Time User? Click Here link in the Sign In box. You will be redirect to the New Member Sign Up page. 3. Enter your [employer] Access Code exactly as it appears below. You will not need to use this code after youve completed the sign-up process. If you do not sign up before the expiration date, you must request a new code. [employer] Access Code: Activation code not generated  Current [employer] Status: Active (This is the date your [employer] access code will )    4. Enter the last four digits of your Social Security Number (xxxx) and Date of Birth (mm/dd/yyyy) as indicated and click Submit. You will be taken to the next sign-up page. 5. Create a [employer] ID. This will be your [employer] login ID and cannot be changed, so think of one that is secure and easy to remember. 6. Create a [employer] password. You can change your password at any time. 7. Enter your Password Reset Question and Answer. This can be used at a later time if you forget your password. 8. Enter your e-mail address. You will receive e-mail notification when new information is available in [address] 9. Click Sign Up. You can now view and download portions of your medical record. 10. Click the Download Summary menu link to download a portable copy of your medical information.     Additional Information    If you have questions, please visit the Frequently Asked Questions section of the [employer] website at [email or web address] Remember, [employer] is NOT to be used for urgent needs. For medical emergencies, dial 911.

## 2018-07-20 ENCOUNTER — TELEPHONE (OUTPATIENT)
Dept: INTERNAL MEDICINE CLINIC | Age: 58
End: 2018-07-20

## 2018-07-20 NOTE — TELEPHONE ENCOUNTER
I scheduled her to see Dr Gabriel Martinez on 08/31/18 but she wanted to be seen sooner possible with another provider. Donn sexton.

## 2018-07-20 NOTE — PROGRESS NOTES
Contacted patient at home of her hospital follow-up PCP transitional care appointment with Dr. Briseida Hooper for Tuesday, 7/31/18 at 10:45 a.m. Pending patient discharge.   Michele Mccann, Care Management Specialist.

## 2018-07-23 NOTE — TELEPHONE ENCOUNTER
Identified patient 2 identifiers verified. Patient has an appointment with Dr. Henrik Rao on 7/31/18.

## 2018-07-24 ENCOUNTER — OFFICE VISIT (OUTPATIENT)
Dept: GYNECOLOGY | Age: 58
End: 2018-07-24

## 2018-07-24 VITALS
DIASTOLIC BLOOD PRESSURE: 75 MMHG | HEART RATE: 98 BPM | WEIGHT: 293 LBS | SYSTOLIC BLOOD PRESSURE: 104 MMHG | BODY MASS INDEX: 43.4 KG/M2 | HEIGHT: 69 IN

## 2018-07-24 DIAGNOSIS — C80.0 CARCINOMATOSIS (HCC): ICD-10-CM

## 2018-07-24 DIAGNOSIS — G47.33 OBSTRUCTIVE SLEEP APNEA SYNDROME: ICD-10-CM

## 2018-07-24 DIAGNOSIS — N18.30 STAGE 3 CHRONIC KIDNEY DISEASE (HCC): ICD-10-CM

## 2018-07-24 DIAGNOSIS — C56.9 MALIGNANT NEOPLASM OF OVARY, UNSPECIFIED LATERALITY (HCC): Primary | ICD-10-CM

## 2018-07-24 DIAGNOSIS — R22.2 CHEST WALL MASS: ICD-10-CM

## 2018-07-24 DIAGNOSIS — E66.01 MORBID OBESITY (HCC): ICD-10-CM

## 2018-07-24 DIAGNOSIS — R97.1 ELEVATED CA-125: ICD-10-CM

## 2018-07-24 NOTE — PROGRESS NOTES
[facility]  [address] [place] [address] [address], [address]  P [phone]  F [phone]       Patient: Clance Boeck  7/24/2018  Primary Dx: Disseminated malignant neoplasm ([facility] [C80.0]    ICD-10-CM ICD-9-CM    1. Malignant neoplasm of ovary, unspecified laterality (HCC) C56.9 183.0    2. Chest wall mass R22.2 786.6    3. Carcinomatosis ([facility] [address] C80.0 199.0    4. Morbid obesity ([facility] [address] E66.01 278.01    5. Stage 3 chronic kidney disease N18.3 585.3    6. Elevated CA-125 R97.1 795.82    7. Obstructive sleep apnea syndrome G47.33 327.23      Oncology History:  Diagnosis: Ovarian cancer  Original Surgery: 9/21/12 Ex-lap, ROULA/BSO, radical tumor debulking  Pathology: High grade serious ovarian cancer with omental cake and carcinomatosis. Stage IIIC      Oncology treatment history:  9/2012: Dx with ovarian cancer in Sept 2012.  She had MARAH/BSO/Cytoreductive surgery on 9/21/12. At that time she was Stage III C  10/2012-2/2013: 6 cycles Carbo/Taxol  8/2014-11/2014: 6 Cycles carbo/Taxol  3/2015-5/13/15: 3 cycles Avastin/Doxil until progression  7/2015-8/2015 Weekly Topotecan   12/2015-3/2016: Weekly Topotecan  2/2017-9/2017:  Gemzar D1/D8 Q28 days for 6 cycles  5/8/2018-Present: Taxol  D1,8,15 for scheduled 3  cycles      S/P IV Taxol (weekly #3 (2wk limited)-7/10/2018  Component      Latest Ref Rng & Units 7/3/2018 6/5/2018 5/3/2018 3/28/2018          10:35 AM 10:28 AM 11:15 AM 11:46 AM   CA-125      1.5 - 35.0 U/mL 68 (H) 96 (H)     Cancer Ag (CA) 125      0.0 - 38.1 U/mL   226.1 (H) 271.7 (H)     Component      Latest Ref Rng & Units 12/5/2017 8/28/2017 8/8/2017          10:26 AM 12:25 PM 10:44 AM   CA-125      1.5 - 35.0 U/mL 141 (H)  160 (H)   Cancer Ag (CA) 125      0.0 - 38.1 U/mL  183.8 (H)        Subjective:      The patient presents today following a brief [facility] admission for chest pain:  Hospital Course:   65 y.o. dx with stage IIIC ovarian cancer in Sept 2012 s/p MARAH/BSO/Cytoreductive surgery on 9/21/12. She recieved 6 cycles of Carbo/Taxol with CR. She reoccurred in fall of 2014 treated with 3 cycles of Carbo/Taxol before having a reaction to Carbo, continued her next 3 cycles with Doxil/Avastin. CT w/o reduction in disease. She restarted treatment with Topotecan for 11 cycles (July 2015 - March 2016). She was followed by Dr. Chai Gilmore and in Jan 2017, she had a CT scan that showed recurrence of disease. She was given Gemzar with good results. In April 2018, she presented to the [facility] ER with c/o CP and a Chest CT showed chest wall mass in the right parasternal region that invades the sternum with \"massive adenopathy in the mediastinum and increasing adenopathy in the retrocrural region and upper abd. CT guided bx of chest wall mass was performed that showed Metastatic high-grade serous carcinoma   She has been treated with weekly Taxol from 5/8/18 - most recently 7/10/18.     She presented to [facility] ER with c/o SOB and chest pain. Cardiac w/u neg for ACS. She was treated with inhalers, steroid and sx mgmt. CT of chest, abd pelvis continues to show extensive disease, though stable to mildly improved. She recovered well, dyspnea improved with cough/allergy treatments. Chest pain related to tumor infiltration tolerated without analgesic requirements. On day of discharge right eye crusted, injected, treated for presumptive conjunctivitis, antibiotics provided. F/u for treatment response and palliative chemotherapy continuation scheduled. F/u with PCP for cold/URI/allergy symptom control.     CT Results (most recent):     Results from Hospital Encounter encounter on 07/17/18   CT ABD PELV W CONT    Narrative INDICATION: Chest pain. FINDINGS: Images through the pulmonary vessels reveal no filling defects or  meniscus to suggest pulmonary embolic process. Images through the mediastinum reveal extensive confluent mediastinal  lymphadenopathy.  A mass lesion is present at the sternoclavicular junction  region measuring roughly 4.3 x 3.9 cm in size. Images through the lungs reveal scattered subsegmental atelectasis but no  confluent airspace disease or significant pleural effusion or parenchymal mass  lesion. Right cardiophrenic angle lymphadenopathy. Images through the abdomen demonstrate extensive massive celiac and rocio  hepatis and portacaval lymphadenopathy. Dominant mass in the rocio hepatis  region measures roughly 7.1 x 5.3 cm in size. Scattered hepatic parenchymal  hypodensities are present and are nonspecific. Spleen is within normal limits. Adrenal glands are within normal limits. Bilateral renal function. Tiny  bilateral nonobstructing renal calculi. Retroperitoneal lymphadenopathy at the level of the right kidney measures 4.9 x  3.5 cm. Omental mass in the anterior midabdomen measures roughly 5.6 x 3.0 cm. Images through the abdomen and pelvis reveal numerous peritoneal and  retroperitoneal mass lesions. In the pelvis, left external iliac adenopathy  noted measures 4.8 x 2.9 cm. There is a cul-de-sac pelvic mass measuring roughly  5.0 cm in size. Right groin lymphadenopathy.           Impression IMPRESSION:   Extensive confluent mediastinal and right cardiophrenic angle lymphadenopathy. 4.3 x 3.9 cm mass lesion on the right at the sternoclavicular junction. No evidence of pulmonary embolism. Hepatomegaly. Extensive retroperitoneal, peritoneal, and pelvic mass lesions. Findings most consistent with widespread malignancy. Multiple lesions amenable for CT-guided biopsy if indicated clinically.              The patient has completed 2-3 cycles of weekly Taxol. Active, no restrictions. Negative  and GI review. Continues nutritional dietary protocol and exercise. Now under 300 lbs for the first time in decades. No fever/chills  Negative cardiopulmonary review. Objective:        Intake/Output Summary (Last 24 hours) at 07/19/18 9633  Last data filed at 07/19/18 0529   Gross per 24 hour   Intake           1067.5 ml   Output             3000 ml   Net          -1932.5 ml       Physical Exam  /75 (BP 1 Location: Left arm, BP Patient Position: Sitting)  Pulse 98  Ht 5' 9\" (1.753 m)  Wt 299 lb 9.6 oz (135.9 kg)  LMP 09/07/2011  Breastfeeding? Unknown  BMI 44.24 kg/m2     General:  alert, cooperative, no distress. Right eye periorbital mild edema/erythema injected sclera. Cardiac:  Regular rate and rhythm        Lungs:  clear to auscultation bilaterally. .  Abdomen:  soft, nondistended, nontender, limited examination. No gross fluid wave or organomegaly. Extremity: no edema        Data Review  Lab Results   Component Value Date/Time    WBC 7.1 07/19/2018 05:24 AM    ABS. NEUTROPHILS 4.0 07/19/2018 05:24 AM    HGB 7.4 (L) 07/19/2018 05:24 AM    HCT 24.4 (L) 07/19/2018 05:24 AM    MCV 98.0 07/19/2018 05:24 AM    MCH 29.7 07/19/2018 05:24 AM    PLATELET 526 44/10/0771 05:24 AM     Lab Results   Component Value Date/Time    Sodium 141 07/18/2018 04:05 AM    Potassium 4.5 07/18/2018 04:05 AM    Chloride 109 (H) 07/18/2018 04:05 AM    CO2 21 07/18/2018 04:05 AM    Glucose 189 (H) 07/18/2018 04:05 AM    BUN 18 07/18/2018 04:05 AM    Creatinine 1.08 (H) 07/18/2018 04:05 AM    Calcium 8.4 (L) 07/18/2018 04:05 AM    Albumin 2.6 (L) 07/18/2018 04:05 AM    Bilirubin, total 0.3 07/18/2018 04:05 AM    AST (SGOT) 23 07/18/2018 04:05 AM    ALT (SGPT) 19 07/18/2018 04:05 AM    Alk. phosphatase 64 07/18/2018 04:05 AM         Assessment/Plan:       65 y.o. AA with history of FIGO stage IIIC high grade serous ovarian cancer dating back to Sept 2012, BRCA germ line negative s/p primary Taxol/carbo with retreatment in 2014 and Doxil/Avastin, topotecan, gemzar and now single agent weekly Taxol initiated 5/8/18 following progression with chest wall involvement. Last chemo July 10th. Onc: widely known metastatic thoracic disease.  Improving  and palpable lesions on exam.   Heme/CV: Hemodynamically stable. Anemia chronic disease/chemotherapy. Holding transfusion, ACS essentially ruled out, troponins remain normal. Cards evaluated. Pulm: improved overall. CXR no acute findings. ? URI and now conjunctivitis, allergic/viral component. D/t patient factors treat conjunctivitis. Overall sx improved. In view of the apparent response to the weekly Taxol I have recommended continuation, 3 additional cycles with interval evaluation. Patient agrees will schedule appointments. All questions answered.       Harshad López MD

## 2018-07-26 ENCOUNTER — TELEPHONE (OUTPATIENT)
Dept: GYNECOLOGY | Age: 58
End: 2018-07-26

## 2018-07-26 RX ORDER — DIPHENHYDRAMINE HYDROCHLORIDE 50 MG/ML
50 INJECTION, SOLUTION INTRAMUSCULAR; INTRAVENOUS AS NEEDED
Status: CANCELLED
Start: 2018-10-09

## 2018-07-26 RX ORDER — ONDANSETRON 2 MG/ML
8 INJECTION INTRAMUSCULAR; INTRAVENOUS AS NEEDED
Status: CANCELLED | OUTPATIENT
Start: 2018-08-28

## 2018-07-26 RX ORDER — SODIUM CHLORIDE 0.9 % (FLUSH) 0.9 %
10 SYRINGE (ML) INJECTION AS NEEDED
Status: CANCELLED
Start: 2018-10-02

## 2018-07-26 RX ORDER — SODIUM CHLORIDE 0.9 % (FLUSH) 0.9 %
10 SYRINGE (ML) INJECTION AS NEEDED
Status: CANCELLED
Start: 2018-07-31

## 2018-07-26 RX ORDER — DEXAMETHASONE SODIUM PHOSPHATE 4 MG/ML
10 INJECTION, SOLUTION INTRA-ARTICULAR; INTRALESIONAL; INTRAMUSCULAR; INTRAVENOUS; SOFT TISSUE ONCE
Status: CANCELLED | OUTPATIENT
Start: 2018-09-11 | End: 2018-09-11

## 2018-07-26 RX ORDER — SODIUM CHLORIDE 9 MG/ML
500 INJECTION, SOLUTION INTRAVENOUS CONTINUOUS
Status: CANCELLED | OUTPATIENT
Start: 2018-08-07

## 2018-07-26 RX ORDER — SODIUM CHLORIDE 9 MG/ML
10 INJECTION INTRAMUSCULAR; INTRAVENOUS; SUBCUTANEOUS AS NEEDED
Status: CANCELLED | OUTPATIENT
Start: 2018-09-04

## 2018-07-26 RX ORDER — ACETAMINOPHEN 325 MG/1
650 TABLET ORAL AS NEEDED
Status: CANCELLED
Start: 2018-09-11

## 2018-07-26 RX ORDER — DIPHENHYDRAMINE HYDROCHLORIDE 50 MG/ML
50 INJECTION, SOLUTION INTRAMUSCULAR; INTRAVENOUS ONCE
Status: CANCELLED
Start: 2018-09-25 | End: 2018-09-25

## 2018-07-26 RX ORDER — DIPHENHYDRAMINE HYDROCHLORIDE 50 MG/ML
50 INJECTION, SOLUTION INTRAMUSCULAR; INTRAVENOUS ONCE
Status: CANCELLED
Start: 2018-08-28 | End: 2018-08-28

## 2018-07-26 RX ORDER — DEXAMETHASONE SODIUM PHOSPHATE 4 MG/ML
10 INJECTION, SOLUTION INTRA-ARTICULAR; INTRALESIONAL; INTRAMUSCULAR; INTRAVENOUS; SOFT TISSUE ONCE
Status: CANCELLED | OUTPATIENT
Start: 2018-08-07 | End: 2018-08-07

## 2018-07-26 RX ORDER — HEPARIN 100 UNIT/ML
300-500 SYRINGE INTRAVENOUS AS NEEDED
Status: CANCELLED
Start: 2018-08-07

## 2018-07-26 RX ORDER — DIPHENHYDRAMINE HYDROCHLORIDE 50 MG/ML
50 INJECTION, SOLUTION INTRAMUSCULAR; INTRAVENOUS ONCE
Status: CANCELLED
Start: 2018-10-09 | End: 2018-10-09

## 2018-07-26 RX ORDER — SODIUM CHLORIDE 9 MG/ML
10 INJECTION INTRAMUSCULAR; INTRAVENOUS; SUBCUTANEOUS AS NEEDED
Status: CANCELLED | OUTPATIENT
Start: 2018-08-14

## 2018-07-26 RX ORDER — SODIUM CHLORIDE 9 MG/ML
500 INJECTION, SOLUTION INTRAVENOUS CONTINUOUS
Status: CANCELLED | OUTPATIENT
Start: 2018-08-14

## 2018-07-26 RX ORDER — HYDROCORTISONE SODIUM SUCCINATE 100 MG/2ML
100 INJECTION, POWDER, FOR SOLUTION INTRAMUSCULAR; INTRAVENOUS AS NEEDED
Status: CANCELLED | OUTPATIENT
Start: 2018-10-02

## 2018-07-26 RX ORDER — SODIUM CHLORIDE 0.9 % (FLUSH) 0.9 %
10 SYRINGE (ML) INJECTION AS NEEDED
Status: CANCELLED
Start: 2018-09-11

## 2018-07-26 RX ORDER — DIPHENHYDRAMINE HYDROCHLORIDE 50 MG/ML
50 INJECTION, SOLUTION INTRAMUSCULAR; INTRAVENOUS AS NEEDED
Status: CANCELLED
Start: 2018-07-31

## 2018-07-26 RX ORDER — ACETAMINOPHEN 325 MG/1
650 TABLET ORAL AS NEEDED
Status: CANCELLED
Start: 2018-07-31

## 2018-07-26 RX ORDER — SODIUM CHLORIDE 0.9 % (FLUSH) 0.9 %
10 SYRINGE (ML) INJECTION AS NEEDED
Status: CANCELLED
Start: 2018-08-28

## 2018-07-26 RX ORDER — HEPARIN 100 UNIT/ML
300-500 SYRINGE INTRAVENOUS AS NEEDED
Status: CANCELLED
Start: 2018-10-09

## 2018-07-26 RX ORDER — SODIUM CHLORIDE 0.9 % (FLUSH) 0.9 %
10 SYRINGE (ML) INJECTION AS NEEDED
Status: CANCELLED
Start: 2018-08-14

## 2018-07-26 RX ORDER — DIPHENHYDRAMINE HYDROCHLORIDE 50 MG/ML
50 INJECTION, SOLUTION INTRAMUSCULAR; INTRAVENOUS AS NEEDED
Status: CANCELLED
Start: 2018-09-04

## 2018-07-26 RX ORDER — ONDANSETRON 2 MG/ML
8 INJECTION INTRAMUSCULAR; INTRAVENOUS AS NEEDED
Status: CANCELLED | OUTPATIENT
Start: 2018-08-07

## 2018-07-26 RX ORDER — ALBUTEROL SULFATE 0.83 MG/ML
2.5 SOLUTION RESPIRATORY (INHALATION) AS NEEDED
Status: CANCELLED
Start: 2018-10-09

## 2018-07-26 RX ORDER — ALBUTEROL SULFATE 0.83 MG/ML
2.5 SOLUTION RESPIRATORY (INHALATION) AS NEEDED
Status: CANCELLED
Start: 2018-09-11

## 2018-07-26 RX ORDER — DIPHENHYDRAMINE HYDROCHLORIDE 50 MG/ML
50 INJECTION, SOLUTION INTRAMUSCULAR; INTRAVENOUS AS NEEDED
Status: CANCELLED
Start: 2018-09-11

## 2018-07-26 RX ORDER — HYDROCORTISONE SODIUM SUCCINATE 100 MG/2ML
100 INJECTION, POWDER, FOR SOLUTION INTRAMUSCULAR; INTRAVENOUS AS NEEDED
Status: CANCELLED | OUTPATIENT
Start: 2018-08-28

## 2018-07-26 RX ORDER — DIPHENHYDRAMINE HYDROCHLORIDE 50 MG/ML
50 INJECTION, SOLUTION INTRAMUSCULAR; INTRAVENOUS AS NEEDED
Status: CANCELLED
Start: 2018-08-14

## 2018-07-26 RX ORDER — ONDANSETRON 2 MG/ML
8 INJECTION INTRAMUSCULAR; INTRAVENOUS AS NEEDED
Status: CANCELLED | OUTPATIENT
Start: 2018-10-02

## 2018-07-26 RX ORDER — HYDROCORTISONE SODIUM SUCCINATE 100 MG/2ML
100 INJECTION, POWDER, FOR SOLUTION INTRAMUSCULAR; INTRAVENOUS AS NEEDED
Status: CANCELLED | OUTPATIENT
Start: 2018-10-09

## 2018-07-26 RX ORDER — SODIUM CHLORIDE 0.9 % (FLUSH) 0.9 %
10 SYRINGE (ML) INJECTION AS NEEDED
Status: CANCELLED
Start: 2018-08-07

## 2018-07-26 RX ORDER — ONDANSETRON 2 MG/ML
8 INJECTION INTRAMUSCULAR; INTRAVENOUS AS NEEDED
Status: CANCELLED | OUTPATIENT
Start: 2018-09-04

## 2018-07-26 RX ORDER — DEXAMETHASONE SODIUM PHOSPHATE 4 MG/ML
10 INJECTION, SOLUTION INTRA-ARTICULAR; INTRALESIONAL; INTRAMUSCULAR; INTRAVENOUS; SOFT TISSUE ONCE
Status: CANCELLED | OUTPATIENT
Start: 2018-09-04 | End: 2018-09-04

## 2018-07-26 RX ORDER — EPINEPHRINE 1 MG/ML
0.3 INJECTION, SOLUTION, CONCENTRATE INTRAVENOUS AS NEEDED
Status: CANCELLED | OUTPATIENT
Start: 2018-10-02

## 2018-07-26 RX ORDER — HEPARIN 100 UNIT/ML
300-500 SYRINGE INTRAVENOUS AS NEEDED
Status: CANCELLED
Start: 2018-09-25

## 2018-07-26 RX ORDER — SODIUM CHLORIDE 9 MG/ML
500 INJECTION, SOLUTION INTRAVENOUS CONTINUOUS
Status: CANCELLED | OUTPATIENT
Start: 2018-10-02

## 2018-07-26 RX ORDER — SODIUM CHLORIDE 9 MG/ML
500 INJECTION, SOLUTION INTRAVENOUS CONTINUOUS
Status: CANCELLED | OUTPATIENT
Start: 2018-10-09

## 2018-07-26 RX ORDER — ACETAMINOPHEN 325 MG/1
650 TABLET ORAL AS NEEDED
Status: CANCELLED
Start: 2018-10-02

## 2018-07-26 RX ORDER — ONDANSETRON 2 MG/ML
8 INJECTION INTRAMUSCULAR; INTRAVENOUS AS NEEDED
Status: CANCELLED | OUTPATIENT
Start: 2018-09-11

## 2018-07-26 RX ORDER — ALBUTEROL SULFATE 0.83 MG/ML
2.5 SOLUTION RESPIRATORY (INHALATION) AS NEEDED
Status: CANCELLED
Start: 2018-09-25

## 2018-07-26 RX ORDER — ONDANSETRON 2 MG/ML
8 INJECTION INTRAMUSCULAR; INTRAVENOUS AS NEEDED
Status: CANCELLED | OUTPATIENT
Start: 2018-08-14

## 2018-07-26 RX ORDER — SODIUM CHLORIDE 9 MG/ML
10 INJECTION INTRAMUSCULAR; INTRAVENOUS; SUBCUTANEOUS AS NEEDED
Status: CANCELLED | OUTPATIENT
Start: 2018-08-07

## 2018-07-26 RX ORDER — DIPHENHYDRAMINE HYDROCHLORIDE 50 MG/ML
50 INJECTION, SOLUTION INTRAMUSCULAR; INTRAVENOUS ONCE
Status: CANCELLED
Start: 2018-09-11 | End: 2018-09-11

## 2018-07-26 RX ORDER — DIPHENHYDRAMINE HYDROCHLORIDE 50 MG/ML
50 INJECTION, SOLUTION INTRAMUSCULAR; INTRAVENOUS ONCE
Status: CANCELLED
Start: 2018-09-04 | End: 2018-09-04

## 2018-07-26 RX ORDER — EPINEPHRINE 1 MG/ML
0.3 INJECTION, SOLUTION, CONCENTRATE INTRAVENOUS AS NEEDED
Status: CANCELLED | OUTPATIENT
Start: 2018-09-04

## 2018-07-26 RX ORDER — DEXAMETHASONE SODIUM PHOSPHATE 4 MG/ML
10 INJECTION, SOLUTION INTRA-ARTICULAR; INTRALESIONAL; INTRAMUSCULAR; INTRAVENOUS; SOFT TISSUE ONCE
Status: CANCELLED | OUTPATIENT
Start: 2018-09-25 | End: 2018-09-25

## 2018-07-26 RX ORDER — SODIUM CHLORIDE 9 MG/ML
500 INJECTION, SOLUTION INTRAVENOUS CONTINUOUS
Status: CANCELLED | OUTPATIENT
Start: 2018-09-25

## 2018-07-26 RX ORDER — HEPARIN 100 UNIT/ML
300-500 SYRINGE INTRAVENOUS AS NEEDED
Status: CANCELLED
Start: 2018-08-28

## 2018-07-26 RX ORDER — HYDROCORTISONE SODIUM SUCCINATE 100 MG/2ML
100 INJECTION, POWDER, FOR SOLUTION INTRAMUSCULAR; INTRAVENOUS AS NEEDED
Status: CANCELLED | OUTPATIENT
Start: 2018-09-11

## 2018-07-26 RX ORDER — EPINEPHRINE 1 MG/ML
0.3 INJECTION, SOLUTION, CONCENTRATE INTRAVENOUS AS NEEDED
Status: CANCELLED | OUTPATIENT
Start: 2018-08-07

## 2018-07-26 RX ORDER — ACETAMINOPHEN 325 MG/1
650 TABLET ORAL AS NEEDED
Status: CANCELLED
Start: 2018-08-14

## 2018-07-26 RX ORDER — ONDANSETRON 2 MG/ML
8 INJECTION INTRAMUSCULAR; INTRAVENOUS AS NEEDED
Status: CANCELLED | OUTPATIENT
Start: 2018-10-09

## 2018-07-26 RX ORDER — SODIUM CHLORIDE 0.9 % (FLUSH) 0.9 %
10 SYRINGE (ML) INJECTION AS NEEDED
Status: CANCELLED
Start: 2018-09-04

## 2018-07-26 RX ORDER — HEPARIN 100 UNIT/ML
300-500 SYRINGE INTRAVENOUS AS NEEDED
Status: CANCELLED
Start: 2018-07-31

## 2018-07-26 RX ORDER — HYDROCORTISONE SODIUM SUCCINATE 100 MG/2ML
100 INJECTION, POWDER, FOR SOLUTION INTRAMUSCULAR; INTRAVENOUS AS NEEDED
Status: CANCELLED | OUTPATIENT
Start: 2018-08-14

## 2018-07-26 RX ORDER — EPINEPHRINE 1 MG/ML
0.3 INJECTION, SOLUTION, CONCENTRATE INTRAVENOUS AS NEEDED
Status: CANCELLED | OUTPATIENT
Start: 2018-07-31

## 2018-07-26 RX ORDER — EPINEPHRINE 1 MG/ML
0.3 INJECTION, SOLUTION, CONCENTRATE INTRAVENOUS AS NEEDED
Status: CANCELLED | OUTPATIENT
Start: 2018-09-25

## 2018-07-26 RX ORDER — DIPHENHYDRAMINE HYDROCHLORIDE 50 MG/ML
50 INJECTION, SOLUTION INTRAMUSCULAR; INTRAVENOUS AS NEEDED
Status: CANCELLED
Start: 2018-08-28

## 2018-07-26 RX ORDER — DEXAMETHASONE SODIUM PHOSPHATE 4 MG/ML
10 INJECTION, SOLUTION INTRA-ARTICULAR; INTRALESIONAL; INTRAMUSCULAR; INTRAVENOUS; SOFT TISSUE ONCE
Status: CANCELLED | OUTPATIENT
Start: 2018-07-31 | End: 2018-07-31

## 2018-07-26 RX ORDER — DEXAMETHASONE SODIUM PHOSPHATE 4 MG/ML
10 INJECTION, SOLUTION INTRA-ARTICULAR; INTRALESIONAL; INTRAMUSCULAR; INTRAVENOUS; SOFT TISSUE ONCE
Status: CANCELLED | OUTPATIENT
Start: 2018-08-28 | End: 2018-08-28

## 2018-07-26 RX ORDER — SODIUM CHLORIDE 9 MG/ML
10 INJECTION INTRAMUSCULAR; INTRAVENOUS; SUBCUTANEOUS AS NEEDED
Status: CANCELLED | OUTPATIENT
Start: 2018-07-31

## 2018-07-26 RX ORDER — ALBUTEROL SULFATE 0.83 MG/ML
2.5 SOLUTION RESPIRATORY (INHALATION) AS NEEDED
Status: CANCELLED
Start: 2018-07-31

## 2018-07-26 RX ORDER — ONDANSETRON 2 MG/ML
8 INJECTION INTRAMUSCULAR; INTRAVENOUS AS NEEDED
Status: CANCELLED | OUTPATIENT
Start: 2018-07-31

## 2018-07-26 RX ORDER — EPINEPHRINE 1 MG/ML
0.3 INJECTION, SOLUTION, CONCENTRATE INTRAVENOUS AS NEEDED
Status: CANCELLED | OUTPATIENT
Start: 2018-10-09

## 2018-07-26 RX ORDER — ACETAMINOPHEN 325 MG/1
650 TABLET ORAL AS NEEDED
Status: CANCELLED
Start: 2018-09-04

## 2018-07-26 RX ORDER — ALBUTEROL SULFATE 0.83 MG/ML
2.5 SOLUTION RESPIRATORY (INHALATION) AS NEEDED
Status: CANCELLED
Start: 2018-08-28

## 2018-07-26 RX ORDER — DIPHENHYDRAMINE HYDROCHLORIDE 50 MG/ML
50 INJECTION, SOLUTION INTRAMUSCULAR; INTRAVENOUS AS NEEDED
Status: CANCELLED
Start: 2018-10-02

## 2018-07-26 RX ORDER — EPINEPHRINE 1 MG/ML
0.3 INJECTION, SOLUTION, CONCENTRATE INTRAVENOUS AS NEEDED
Status: CANCELLED | OUTPATIENT
Start: 2018-09-11

## 2018-07-26 RX ORDER — DEXAMETHASONE SODIUM PHOSPHATE 4 MG/ML
10 INJECTION, SOLUTION INTRA-ARTICULAR; INTRALESIONAL; INTRAMUSCULAR; INTRAVENOUS; SOFT TISSUE ONCE
Status: CANCELLED | OUTPATIENT
Start: 2018-10-02 | End: 2018-10-02

## 2018-07-26 RX ORDER — DIPHENHYDRAMINE HYDROCHLORIDE 50 MG/ML
50 INJECTION, SOLUTION INTRAMUSCULAR; INTRAVENOUS ONCE
Status: CANCELLED
Start: 2018-08-14 | End: 2018-08-14

## 2018-07-26 RX ORDER — SODIUM CHLORIDE 9 MG/ML
10 INJECTION INTRAMUSCULAR; INTRAVENOUS; SUBCUTANEOUS AS NEEDED
Status: CANCELLED | OUTPATIENT
Start: 2018-08-28

## 2018-07-26 RX ORDER — SODIUM CHLORIDE 9 MG/ML
500 INJECTION, SOLUTION INTRAVENOUS CONTINUOUS
Status: CANCELLED | OUTPATIENT
Start: 2018-08-28

## 2018-07-26 RX ORDER — DIPHENHYDRAMINE HYDROCHLORIDE 50 MG/ML
50 INJECTION, SOLUTION INTRAMUSCULAR; INTRAVENOUS AS NEEDED
Status: CANCELLED
Start: 2018-08-07

## 2018-07-26 RX ORDER — SODIUM CHLORIDE 0.9 % (FLUSH) 0.9 %
10 SYRINGE (ML) INJECTION AS NEEDED
Status: CANCELLED
Start: 2018-10-09

## 2018-07-26 RX ORDER — ACETAMINOPHEN 325 MG/1
650 TABLET ORAL AS NEEDED
Status: CANCELLED
Start: 2018-08-07

## 2018-07-26 RX ORDER — DEXAMETHASONE SODIUM PHOSPHATE 4 MG/ML
10 INJECTION, SOLUTION INTRA-ARTICULAR; INTRALESIONAL; INTRAMUSCULAR; INTRAVENOUS; SOFT TISSUE ONCE
Status: CANCELLED | OUTPATIENT
Start: 2018-10-09 | End: 2018-10-09

## 2018-07-26 RX ORDER — ALBUTEROL SULFATE 0.83 MG/ML
2.5 SOLUTION RESPIRATORY (INHALATION) AS NEEDED
Status: CANCELLED
Start: 2018-08-14

## 2018-07-26 RX ORDER — SODIUM CHLORIDE 9 MG/ML
500 INJECTION, SOLUTION INTRAVENOUS CONTINUOUS
Status: CANCELLED | OUTPATIENT
Start: 2018-09-11

## 2018-07-26 RX ORDER — DEXAMETHASONE SODIUM PHOSPHATE 4 MG/ML
10 INJECTION, SOLUTION INTRA-ARTICULAR; INTRALESIONAL; INTRAMUSCULAR; INTRAVENOUS; SOFT TISSUE ONCE
Status: COMPLETED | OUTPATIENT
Start: 2018-07-31 | End: 2018-07-31

## 2018-07-26 RX ORDER — DIPHENHYDRAMINE HYDROCHLORIDE 50 MG/ML
50 INJECTION, SOLUTION INTRAMUSCULAR; INTRAVENOUS AS NEEDED
Status: CANCELLED
Start: 2018-09-25

## 2018-07-26 RX ORDER — DIPHENHYDRAMINE HYDROCHLORIDE 50 MG/ML
50 INJECTION, SOLUTION INTRAMUSCULAR; INTRAVENOUS ONCE
Status: CANCELLED
Start: 2018-07-31 | End: 2018-07-31

## 2018-07-26 RX ORDER — SODIUM CHLORIDE 9 MG/ML
10 INJECTION INTRAMUSCULAR; INTRAVENOUS; SUBCUTANEOUS AS NEEDED
Status: CANCELLED | OUTPATIENT
Start: 2018-09-11

## 2018-07-26 RX ORDER — SODIUM CHLORIDE 9 MG/ML
500 INJECTION, SOLUTION INTRAVENOUS CONTINUOUS
Status: CANCELLED | OUTPATIENT
Start: 2018-09-04

## 2018-07-26 RX ORDER — HEPARIN 100 UNIT/ML
300-500 SYRINGE INTRAVENOUS AS NEEDED
Status: CANCELLED
Start: 2018-08-14

## 2018-07-26 RX ORDER — SODIUM CHLORIDE 9 MG/ML
500 INJECTION, SOLUTION INTRAVENOUS CONTINUOUS
Status: CANCELLED | OUTPATIENT
Start: 2018-07-31

## 2018-07-26 RX ORDER — ONDANSETRON 2 MG/ML
8 INJECTION INTRAMUSCULAR; INTRAVENOUS AS NEEDED
Status: CANCELLED | OUTPATIENT
Start: 2018-09-25

## 2018-07-26 RX ORDER — DIPHENHYDRAMINE HYDROCHLORIDE 50 MG/ML
50 INJECTION, SOLUTION INTRAMUSCULAR; INTRAVENOUS ONCE
Status: CANCELLED
Start: 2018-08-07 | End: 2018-08-07

## 2018-07-26 RX ORDER — ACETAMINOPHEN 325 MG/1
650 TABLET ORAL AS NEEDED
Status: CANCELLED
Start: 2018-08-28

## 2018-07-26 RX ORDER — HYDROCORTISONE SODIUM SUCCINATE 100 MG/2ML
100 INJECTION, POWDER, FOR SOLUTION INTRAMUSCULAR; INTRAVENOUS AS NEEDED
Status: CANCELLED | OUTPATIENT
Start: 2018-09-04

## 2018-07-26 RX ORDER — HEPARIN 100 UNIT/ML
300-500 SYRINGE INTRAVENOUS AS NEEDED
Status: CANCELLED
Start: 2018-09-11

## 2018-07-26 RX ORDER — SODIUM CHLORIDE 9 MG/ML
10 INJECTION INTRAMUSCULAR; INTRAVENOUS; SUBCUTANEOUS AS NEEDED
Status: CANCELLED | OUTPATIENT
Start: 2018-10-02

## 2018-07-26 RX ORDER — HYDROCORTISONE SODIUM SUCCINATE 100 MG/2ML
100 INJECTION, POWDER, FOR SOLUTION INTRAMUSCULAR; INTRAVENOUS AS NEEDED
Status: CANCELLED | OUTPATIENT
Start: 2018-08-07

## 2018-07-26 RX ORDER — ACETAMINOPHEN 325 MG/1
650 TABLET ORAL AS NEEDED
Status: CANCELLED
Start: 2018-10-09

## 2018-07-26 RX ORDER — ALBUTEROL SULFATE 0.83 MG/ML
2.5 SOLUTION RESPIRATORY (INHALATION) AS NEEDED
Status: CANCELLED
Start: 2018-09-04

## 2018-07-26 RX ORDER — HYDROCORTISONE SODIUM SUCCINATE 100 MG/2ML
100 INJECTION, POWDER, FOR SOLUTION INTRAMUSCULAR; INTRAVENOUS AS NEEDED
Status: CANCELLED | OUTPATIENT
Start: 2018-09-25

## 2018-07-26 RX ORDER — ALBUTEROL SULFATE 0.83 MG/ML
2.5 SOLUTION RESPIRATORY (INHALATION) AS NEEDED
Status: CANCELLED
Start: 2018-08-07

## 2018-07-26 RX ORDER — SODIUM CHLORIDE 0.9 % (FLUSH) 0.9 %
10 SYRINGE (ML) INJECTION AS NEEDED
Status: CANCELLED
Start: 2018-09-25

## 2018-07-26 RX ORDER — ACETAMINOPHEN 325 MG/1
650 TABLET ORAL AS NEEDED
Status: CANCELLED
Start: 2018-09-25

## 2018-07-26 RX ORDER — ALBUTEROL SULFATE 0.83 MG/ML
2.5 SOLUTION RESPIRATORY (INHALATION) AS NEEDED
Status: CANCELLED
Start: 2018-10-02

## 2018-07-26 RX ORDER — HYDROCORTISONE SODIUM SUCCINATE 100 MG/2ML
100 INJECTION, POWDER, FOR SOLUTION INTRAMUSCULAR; INTRAVENOUS AS NEEDED
Status: CANCELLED | OUTPATIENT
Start: 2018-07-31

## 2018-07-26 RX ORDER — SODIUM CHLORIDE 9 MG/ML
10 INJECTION INTRAMUSCULAR; INTRAVENOUS; SUBCUTANEOUS AS NEEDED
Status: CANCELLED | OUTPATIENT
Start: 2018-10-09

## 2018-07-26 RX ORDER — HEPARIN 100 UNIT/ML
300-500 SYRINGE INTRAVENOUS AS NEEDED
Status: CANCELLED
Start: 2018-10-02

## 2018-07-26 RX ORDER — DIPHENHYDRAMINE HYDROCHLORIDE 50 MG/ML
50 INJECTION, SOLUTION INTRAMUSCULAR; INTRAVENOUS ONCE
Status: COMPLETED | OUTPATIENT
Start: 2018-07-31 | End: 2018-07-31

## 2018-07-26 RX ORDER — EPINEPHRINE 1 MG/ML
0.3 INJECTION, SOLUTION, CONCENTRATE INTRAVENOUS AS NEEDED
Status: CANCELLED | OUTPATIENT
Start: 2018-08-28

## 2018-07-26 RX ORDER — DIPHENHYDRAMINE HYDROCHLORIDE 50 MG/ML
50 INJECTION, SOLUTION INTRAMUSCULAR; INTRAVENOUS ONCE
Status: CANCELLED
Start: 2018-10-02 | End: 2018-10-02

## 2018-07-26 RX ORDER — SODIUM CHLORIDE 9 MG/ML
10 INJECTION INTRAMUSCULAR; INTRAVENOUS; SUBCUTANEOUS AS NEEDED
Status: CANCELLED | OUTPATIENT
Start: 2018-09-25

## 2018-07-26 RX ORDER — DEXAMETHASONE SODIUM PHOSPHATE 4 MG/ML
10 INJECTION, SOLUTION INTRA-ARTICULAR; INTRALESIONAL; INTRAMUSCULAR; INTRAVENOUS; SOFT TISSUE ONCE
Status: CANCELLED | OUTPATIENT
Start: 2018-08-14 | End: 2018-08-14

## 2018-07-26 RX ORDER — HEPARIN 100 UNIT/ML
300-500 SYRINGE INTRAVENOUS AS NEEDED
Status: CANCELLED
Start: 2018-09-04

## 2018-07-26 RX ORDER — EPINEPHRINE 1 MG/ML
0.3 INJECTION, SOLUTION, CONCENTRATE INTRAVENOUS AS NEEDED
Status: CANCELLED | OUTPATIENT
Start: 2018-08-14

## 2018-07-31 ENCOUNTER — HOSPITAL ENCOUNTER (OUTPATIENT)
Dept: INFUSION THERAPY | Age: 58
Discharge: HOME | End: 2018-07-31
Payer: MEDICARE

## 2018-07-31 VITALS
HEIGHT: 69 IN | WEIGHT: 293 LBS | BODY MASS INDEX: 43.4 KG/M2 | TEMPERATURE: 98 F | SYSTOLIC BLOOD PRESSURE: 168 MMHG | DIASTOLIC BLOOD PRESSURE: 86 MMHG | RESPIRATION RATE: 18 BRPM | HEART RATE: 66 BPM

## 2018-07-31 PROBLEM — K03.81 CRACKED TOOTH: Status: ACTIVE | Noted: 2018-07-31

## 2018-07-31 PROBLEM — I20.9 ANGINA PECTORIS ASSOCIATED WITH TYPE 2 DIABETES MELLITUS (HCC): Status: RESOLVED | Noted: 2017-02-15 | Resolved: 2018-07-31

## 2018-07-31 PROBLEM — E11.59 ANGINA PECTORIS ASSOCIATED WITH TYPE 2 DIABETES MELLITUS (HCC): Status: RESOLVED | Noted: 2017-02-15 | Resolved: 2018-07-31

## 2018-07-31 LAB
ALBUMIN SERPL-MCNC: 3.2 G/DL (ref 3.5–5)
ALBUMIN/GLOB SERPL: 0.7 {RATIO} (ref 1.1–2.2)
ALP SERPL-CCNC: 75 U/L (ref 45–117)
ALT SERPL-CCNC: 22 U/L (ref 12–78)
ANION GAP SERPL CALC-SCNC: 6 MMOL/L (ref 5–15)
AST SERPL-CCNC: 27 U/L (ref 15–37)
BASOPHILS # BLD: 0.1 K/UL (ref 0–0.1)
BASOPHILS NFR BLD: 1 % (ref 0–1)
BILIRUB SERPL-MCNC: 0.4 MG/DL (ref 0.2–1)
BUN SERPL-MCNC: 20 MG/DL (ref 6–20)
BUN/CREAT SERPL: 16 (ref 12–20)
CALCIUM SERPL-MCNC: 8.7 MG/DL (ref 8.5–10.1)
CANCER AG125 SERPL-ACNC: 59 U/ML (ref 1.5–35)
CHLORIDE SERPL-SCNC: 109 MMOL/L (ref 97–108)
CO2 SERPL-SCNC: 26 MMOL/L (ref 21–32)
CREAT SERPL-MCNC: 1.26 MG/DL (ref 0.55–1.02)
DIFFERENTIAL METHOD BLD: ABNORMAL
EOSINOPHIL # BLD: 0.3 K/UL (ref 0–0.4)
EOSINOPHIL NFR BLD: 3 % (ref 0–7)
ERYTHROCYTE [DISTWIDTH] IN BLOOD BY AUTOMATED COUNT: 14.5 % (ref 11.5–14.5)
GLOBULIN SER CALC-MCNC: 4.9 G/DL (ref 2–4)
GLUCOSE SERPL-MCNC: 143 MG/DL (ref 65–100)
HCT VFR BLD AUTO: 28.4 % (ref 35–47)
HGB BLD-MCNC: 8.6 G/DL (ref 11.5–16)
IMM GRANULOCYTES # BLD: 0 K/UL (ref 0–0.04)
IMM GRANULOCYTES NFR BLD AUTO: 0 % (ref 0–0.5)
LYMPHOCYTES # BLD: 2.2 K/UL (ref 0.8–3.5)
LYMPHOCYTES NFR BLD: 23 % (ref 12–49)
MCH RBC QN AUTO: 29.7 PG (ref 26–34)
MCHC RBC AUTO-ENTMCNC: 30.3 G/DL (ref 30–36.5)
MCV RBC AUTO: 97.9 FL (ref 80–99)
MONOCYTES # BLD: 0.8 K/UL (ref 0–1)
MONOCYTES NFR BLD: 8 % (ref 5–13)
NEUTS SEG # BLD: 6.2 K/UL (ref 1.8–8)
NEUTS SEG NFR BLD: 65 % (ref 32–75)
NRBC # BLD: 0 K/UL (ref 0–0.01)
NRBC BLD-RTO: 0 PER 100 WBC
PLATELET # BLD AUTO: 194 K/UL (ref 150–400)
PMV BLD AUTO: 8.9 FL (ref 8.9–12.9)
POTASSIUM SERPL-SCNC: 4.2 MMOL/L (ref 3.5–5.1)
PROT SERPL-MCNC: 8.1 G/DL (ref 6.4–8.2)
RBC # BLD AUTO: 2.9 M/UL (ref 3.8–5.2)
SODIUM SERPL-SCNC: 141 MMOL/L (ref 136–145)
WBC # BLD AUTO: 9.6 K/UL (ref 3.6–11)

## 2018-07-31 PROCEDURE — 80053 COMPREHEN METABOLIC PANEL: CPT | Performed by: OBSTETRICS & GYNECOLOGY

## 2018-07-31 PROCEDURE — 74011000250 HC RX REV CODE- 250: Performed by: OBSTETRICS & GYNECOLOGY

## 2018-07-31 PROCEDURE — 74011250636 HC RX REV CODE- 250/636: Performed by: PHYSICIAN ASSISTANT

## 2018-07-31 PROCEDURE — 85025 COMPLETE CBC W/AUTO DIFF WBC: CPT | Performed by: OBSTETRICS & GYNECOLOGY

## 2018-07-31 PROCEDURE — 96413 CHEMO IV INFUSION 1 HR: CPT

## 2018-07-31 PROCEDURE — 36415 COLL VENOUS BLD VENIPUNCTURE: CPT | Performed by: OBSTETRICS & GYNECOLOGY

## 2018-07-31 PROCEDURE — 74011250636 HC RX REV CODE- 250/636: Performed by: OBSTETRICS & GYNECOLOGY

## 2018-07-31 PROCEDURE — 86304 IMMUNOASSAY TUMOR CA 125: CPT | Performed by: OBSTETRICS & GYNECOLOGY

## 2018-07-31 PROCEDURE — 96361 HYDRATE IV INFUSION ADD-ON: CPT

## 2018-07-31 PROCEDURE — 74011000258 HC RX REV CODE- 258: Performed by: OBSTETRICS & GYNECOLOGY

## 2018-07-31 PROCEDURE — 96375 TX/PRO/DX INJ NEW DRUG ADDON: CPT

## 2018-07-31 PROCEDURE — 77030012965 HC NDL HUBR BBMI -A

## 2018-07-31 RX ORDER — SODIUM CHLORIDE 0.9 % (FLUSH) 0.9 %
10 SYRINGE (ML) INJECTION AS NEEDED
Status: ACTIVE | OUTPATIENT
Start: 2018-07-31 | End: 2018-08-01

## 2018-07-31 RX ORDER — SODIUM CHLORIDE 9 MG/ML
10 INJECTION INTRAMUSCULAR; INTRAVENOUS; SUBCUTANEOUS AS NEEDED
Status: ACTIVE | OUTPATIENT
Start: 2018-07-31 | End: 2018-08-01

## 2018-07-31 RX ORDER — HEPARIN 100 UNIT/ML
500 SYRINGE INTRAVENOUS AS NEEDED
Status: ACTIVE | OUTPATIENT
Start: 2018-07-31 | End: 2018-08-01

## 2018-07-31 RX ADMIN — SODIUM CHLORIDE 500 ML: 900 INJECTION, SOLUTION INTRAVENOUS at 10:30

## 2018-07-31 RX ADMIN — FAMOTIDINE 20 MG: 10 INJECTION, SOLUTION INTRAVENOUS at 09:51

## 2018-07-31 RX ADMIN — DEXAMETHASONE SODIUM PHOSPHATE 10 MG: 4 INJECTION, SOLUTION INTRAMUSCULAR; INTRAVENOUS at 09:52

## 2018-07-31 RX ADMIN — Medication 10 ML: at 11:49

## 2018-07-31 RX ADMIN — DIPHENHYDRAMINE HYDROCHLORIDE 50 MG: 50 INJECTION INTRAMUSCULAR; INTRAVENOUS at 09:52

## 2018-07-31 RX ADMIN — SODIUM CHLORIDE 10 ML: 9 INJECTION INTRAMUSCULAR; INTRAVENOUS; SUBCUTANEOUS at 08:25

## 2018-07-31 RX ADMIN — PACLITAXEL 120 MG: 6 INJECTION, SOLUTION INTRAVENOUS at 10:41

## 2018-07-31 NOTE — PROGRESS NOTES
[facility] Progress Note    Date: 2018    Name: Maribel Awad    MRN: [id number]         : 1960    Ms. Blake Arrived ambulatory and in no distress for C4D1 of Weekly Pacliatxel regimen. Assessment was completed, no acute issues at this time, no new complaints voiced. LCW port  accessed without difficulty, labs drawn and in process. Chemotherapy Flowsheet 2018   Cycle C4D1   Date 2018   Drug / Regimen Taxol   Dosage -   Pre Hydration -   Pre Meds given   Notes given       Ms. Blake's vitals were reviewed. Visit Vitals    /86    Pulse 66    Temp 98 °F (36.7 °C)    Resp 18    Ht 5' 8.9\" (1.75 m)    Wt 139.7 kg (308 lb)    BMI 45.62 kg/m2       Lab results were obtained and reviewed. Recent Results (from the past 12 hour(s))   CBC WITH AUTOMATED DIFF    Collection Time: 18  8:35 AM   Result Value Ref Range    WBC 9.6 3.6 - 11.0 K/uL    RBC 2.90 (L) 3.80 - 5.20 M/uL    HGB 8.6 (L) 11.5 - 16.0 g/dL    HCT 28.4 (L) 35.0 - 47.0 %    MCV 97.9 80.0 - 99.0 FL    MCH 29.7 26.0 - 34.0 PG    MCHC 30.3 30.0 - 36.5 g/dL    RDW 14.5 11.5 - 14.5 %    PLATELET 157 477 - 888 K/uL    MPV 8.9 8.9 - 12.9 FL    NRBC 0.0 0  WBC    ABSOLUTE NRBC 0.00 0.00 - 0.01 K/uL    NEUTROPHILS 65 32 - 75 %    LYMPHOCYTES 23 12 - 49 %    MONOCYTES 8 5 - 13 %    EOSINOPHILS 3 0 - 7 %    BASOPHILS 1 0 - 1 %    IMMATURE GRANULOCYTES 0 0.0 - 0.5 %    ABS. NEUTROPHILS 6.2 1.8 - 8.0 K/UL    ABS. LYMPHOCYTES 2.2 0.8 - 3.5 K/UL    ABS. MONOCYTES 0.8 0.0 - 1.0 K/UL    ABS. EOSINOPHILS 0.3 0.0 - 0.4 K/UL    ABS. BASOPHILS 0.1 0.0 - 0.1 K/UL    ABS. IMM.  GRANS. 0.0 0.00 - 0.04 K/UL    DF AUTOMATED     METABOLIC PANEL, COMPREHENSIVE    Collection Time: 18  8:35 AM   Result Value Ref Range    Sodium 141 136 - 145 mmol/L    Potassium 4.2 3.5 - 5.1 mmol/L    Chloride 109 (H) 97 - 108 mmol/L    CO2 26 21 - 32 mmol/L    Anion gap 6 5 - 15 mmol/L    Glucose 143 (H) 65 - 100 mg/dL    BUN 20 6 - 20 MG/DL    Creatinine 1.26 (H) 0.55 - 1.02 MG/DL    BUN/Creatinine ratio 16 12 - 20      GFR est AA 53 (L) >60 ml/min/1.73m2    GFR est non-AA 44 (L) >60 ml/min/1.73m2    Calcium 8.7 8.5 - 10.1 MG/DL    Bilirubin, total 0.4 0.2 - 1.0 MG/DL    ALT (SGPT) 22 12 - 78 U/L    AST (SGOT) 27 15 - 37 U/L    Alk. phosphatase 75 45 - 117 U/L    Protein, total 8.1 6.4 - 8.2 g/dL    Albumin 3.2 (L) 3.5 - 5.0 g/dL    Globulin 4.9 (H) 2.0 - 4.0 g/dL    A-G Ratio 0.7 (L) 1.1 - 2.2       Contacted MD office to relay SCr findings; received orders for 500 cc bolus over one hour. Medications:  Diphenhydramine IVP  Famotidine IVP  Dexamethasone IVP  Paclitaxel   ml       Patient Vitals for the past 12 hrs:   Temp Pulse Resp BP   07/31/18 1152 98 °F (36.7 °C) 66 18 168/86   07/31/18 0840 98 °F (36.7 °C) 77 18 119/81       Ms. Blake tolerated treatment well and was discharged from [place] [address] in stable condition at 12 pm. She is to return on 08/07/18 0800 for her next appointment.     Jose Urena  July 31, 2018

## 2018-07-31 NOTE — PROGRESS NOTES
[address], [address], [address]  P [phone]  F [phone]    Office Visit    Patient ID:  Name: Malcom Stanley  MRN: [id number]  : 1960/58 y.o. Visit date: 2018      Primary Diagnosis:     ICD-10-CM ICD-9-CM    1. Malignant neoplasm of ovary, unspecified laterality (HCC) C56.9 183.0    2. HCV antibody positive R76.8 795.79 HCV AB W/RFLX TO VANE   3. Chest wall mass R22.2 786.6    4. On antineoplastic chemotherapy Z79.899 V58.69    5. Type 2 diabetes mellitus with nephropathy (HCC) E11.21 250.40      583.81    6. Lymphadenopathy, mediastinal R59.0 785.6    7. Stage 3 chronic kidney disease N18.3 585.3        HPI  Malcom Stanley is a 63 y.o.  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 The patient's previous treatment procedures include primary Taxol/carbo with retreatment in  and Doxil/Avastin, topotecan, gemzar and now single agent weekly Taxol initiated 18 following progression with chest wall involvement. Most recent EOD imaging 2018 shows overall decrease in size of numerous thoracic and abd/pelvic mets since April and .      OncTx History:  12: MARAH/BSO/Cytoreductive surgery on 12 noted carcinomatosis/omental caking. 10/2012-2013: 6 cycles Carbo/Taxol  2014-2014: 6 Cycles carbo/Taxol  3/2015-5/13/15: 3 cycles Avastin/Doxil until progression  2015-2015 Weekly Topotecan   2015-3/2016: Weekly Topotecan  2017-2017:  Gemzar D1/D8 Q28 days for 6 cycles  2018: CT core needle bx chest wall mass: Metastatic high-grade serous carcinoma (see comment)   General Categorization   Positive for malignancy. 2018-Present: Taxol  D1,8,15      CT Results (most recent):    Results from [facility] ([place] encounter on 18   CT ABD PELV W CONT   Narrative INDICATION: Chest pain.     EXAM: Preliminary  images were obtained. Multiple contiguous helically acquired images were obtained through the thorax  following intravenous contrast 100 ccs administration. Post contrast images were  obtained through the abdomen and pelvis. Steroid prep was performed for history  of rash with contrast aspiration. 3D post processing was performed. Coronal and sagittal reconstructions were performed. CT dose reduction was achieved through the use of a standardized protocol  tailored for this examination and automatic exposure control for dose  modulation. FINDINGS: Images through the pulmonary vessels reveal no filling defects or  meniscus to suggest pulmonary embolic process. Images through the mediastinum reveal extensive confluent mediastinal  lymphadenopathy. A mass lesion is present at the sternoclavicular junction  region measuring roughly 4.3 x 3.9 cm in size. Images through the lungs reveal scattered subsegmental atelectasis but no  confluent airspace disease or significant pleural effusion or parenchymal mass  lesion. Right cardiophrenic angle lymphadenopathy. Images through the abdomen demonstrate extensive massive celiac and rocio  hepatis and portacaval lymphadenopathy. Dominant mass in the rocio hepatis  region measures roughly 7.1 x 5.3 cm in size. Scattered hepatic parenchymal  hypodensities are present and are nonspecific. Spleen is within normal limits. Adrenal glands are within normal limits. Bilateral renal function. Tiny  bilateral nonobstructing renal calculi. Retroperitoneal lymphadenopathy at the level of the right kidney measures 4.9 x  3.5 cm. Omental mass in the anterior midabdomen measures roughly 5.6 x 3.0 cm. Images through the abdomen and pelvis reveal numerous peritoneal and  retroperitoneal mass lesions. In the pelvis, left external iliac adenopathy  noted measures 4.8 x 2.9 cm. There is a cul-de-sac pelvic mass measuring roughly  5.0 cm in size.  Right groin lymphadenopathy. Impression IMPRESSION:     Extensive confluent mediastinal and right cardiophrenic angle lymphadenopathy. 4.3 x 3.9 cm mass lesion on the right at the sternoclavicular junction. No evidence of pulmonary embolism. Hepatomegaly. Extensive retroperitoneal, peritoneal, and pelvic mass lesions. Findings most consistent with widespread malignancy. Multiple lesions amenable for CT-guided biopsy if indicated clinically. CT chest 4/2018:  FINDINGS:  LUNGS: There is a rounded patchy area of airspace process in the periphery of  the superior segment of the right lower lobe as well as patchy areas along the  periphery just inferior to it. Similar finding noted in the periphery of the  lingula as well as more centrally in the lingula. These may be areas of acute  airspace process. Jonathan Stewart PLEURA: There is a chest wall mass which lies along the right side of the  proximal body of the sternum which has pleural base and extends through the  chest wall as well as eroding the adjacent sternum. On axial imaging this mass  is 4.9 x 4.4 cm in size. This may account for the patient's right chest wall  symptoms. TRACHEA/BRONCHI: Patent      The absence of intravenous contrast reduces the sensitivity for evaluation of  the mediastinum and upper abdominal organs.     MEDIASTINUM/SWATI: There is massive adenopathy in the superior mediastinum as  well as adenopathy which extends into the hilar regions bilaterally and in the  subcarinal region. Adenopathy is also identified in the pericardial lymph nodes  on the right. Largest of these measures 2.1 cm. There is also an enlarged lymph  node along the left posterior margin of the manubrium/body of the sternum which  measures 2.9 cm. .     AORTA:  The aorta has a normal contour with no evidence of aneurysm.     UPPER ABDOMEN: On previous abdominal CT adenopathy was noted in the upper  abdomen.  There are retrocrural lymph nodes as well the largest of which on the  left measures 1.4 x 3.7 cm. Previously this lymph node measured 0.8 x 3.1 cm. There is marked adenopathy in the celiac axis extending into the hepatic hilus. This also extends into the area of the falciform ligament. This is more  extensive than on the previous study. There is also perigastric adenopathy on  the left which is now measures approximately 5.3 x 7.4 cm. Previously this was  5.2 x 4.0 cm. .     BONES: As mentioned previously there is lytic erosion along the right side of  the proximal body of the sternum adjacent to the mass. There is a lucency in the  T4 vertebral body.     IMPRESSION:    1. There is a chest wall mass in the right parasternal region which invades the  sternum and is seen on both the internal internal as well as external sides of  the chest wall. This may account for the patient's symptoms of right-sided chest  pain. 2. There are patchy areas of airspace process bilaterally which may represent  areas of acute process in the to be correlated clinically. 3. Massive adenopathy in the mediastinum noted. 4. Increasing adenopathy in the retrocrural region and upper abdomen. .        Lab Results   Component Value Date/Time    CA-125 68 (H) 07/03/2018 10:35 AM    Cancer Ag (CA) 125 226.1 (H) 05/03/2018 11:15 AM       Cancer Ag (CA) 125   Date Value Ref Range Status   05/03/2018 226.1 (H) 0.0 - 38.1 U/mL Final     Comment:     Roche ECLIA methodology   03/28/2018 271.7 (H) 0.0 - 38.1 U/mL Final     Comment:     Roche ECLIA methodology   08/28/2017 183.8 (H) 0.0 - 38.1 U/mL Final     Comment:     Roche ECLIA methodology   07/28/2017 157.4 (H) 0.0 - 38.1 U/mL Final     Comment:     Roche ECLIA methodology   02/28/2017 259.0 (H) 0.0 - 38.1 U/mL Final     Comment:     Roche ECLIA methodology   01/30/2017 347.1 (H) 0.0 - 38.1 U/mL Final     Comment:     Roche ECLIA methodology       SUBJECTIVE:  She presents to [facility] for cycle 4 of weekly therapy.   She was most recently released from hospital in July 2018 with a URI r/o ACS which was negative. Chest wall pain minimal, not requiring analgesia. She is more bothered by arthritic knee pain, uses norco ~1/week. Lately left sided HA she feels may be related to diagnosed cracked left upper molar. She has returned to work, working full time driving/transport for medical patients. Notes leg swelling by end of shift. Using [place] hose at times and Lasix PRN for fluid edema. Cough/ear/eye sx resolved. Using neb txs at home PRN d/t allergies Denies SOB. Appetite stable, no wt loss. Continues to work out. Remains working out at gym and supported by 2 sons and friends. She lost her  last May. She is in counseling, has antidepression therapy and well controlled. She gambles from time to time and is excited for these trips. Control of her sleep apnea with CPAP and her DM averages 90s at home. Most recent A1C 5.2.        ROS  Constitutional: no weight loss, fever, night sweats  Respiratory: no cough, shortness of breath, or wheezing  Cardiovascular: no chest pain or dyspnea on exertion  Heme: No abnormal bleeding  Gastrointestinal: no abdominal pain, change in bowel habits, or black or bloody stools  Genito-Urinary: no dysuria, trouble voiding, or hematuria  Musculoskeletal: + swelling in ankle - bilateral  Neurological: mild neuropathy  Derm: pigmentation/induration medial left leg. Prior injury from fall. Psych: positive for depression - controlled        PMH:  Past Medical History:   Diagnosis Date    Adverse effect of anesthesia     sleep apnea cpap machine useage     Anemia     Arthritis     DDD low back and knees    Asthma     uses albuterol prn only; none x 6 mos.   Never hospitalized    BRCA negative 1/2013    Calculus of kidney     Chronic kidney disease     kidney stones    Chronic pain     knee pain bilat    CINV (chemotherapy-induced nausea and vomiting) 8/21/2014    Decreased hearing, right     PATIENT STATES THIS IS DUE TO CHEMOTHERAPY    Diabetes ([facility] [address] Age 46    IDDM    Environmental allergies     Fibromyalgia     GERD (gastroesophageal reflux disease)     controlled with med    Hx of pulmonary embolus during pregnancy 2015    Hypertension     Ill-defined condition     Sepsis  -  SOURCE PORT    Ill-defined condition 2016    chemotherapy     Mass of chest wall 2018    Morbid obesity ([facility] [address]     Murmur, heart     Other and unspecified hyperlipidemia     Ovarian cancer ([facility] [address] 2012, 2014    serous, high grade, stage IIIc. s/p chemotx (has port)    Psychiatric disorder     Rectal bleeding     Thromboembolus ([facility] [address]     POST PARTUM; resolved- PELVIS    Thyroid disease     HYPOTHYROID    Unspecified sleep apnea     CPAP, Dr. Deepti Quijano     PSH:  Past Surgical History:   Procedure Laterality Date    BREAST SURGERY PROCEDURE UNLISTED      Lymph node in left breast 2017    CHEST SURGERY PROCEDURE UNLISTED      Placement of port a cath right chest    HX GYN       X2    HX HYSTERECTOMY  2012    ROULA/BSO, tumor debulking, omentectomy for ovarian cancer    HX ORTHOPAEDIC      ankle-FX, R    HX ORTHOPAEDIC      FX R ARM    HX UROLOGICAL      stent    HX VASCULAR ACCESS  2015    right chest- port placed     SOC:  Social History     Social History    Marital status:      Spouse name: N/A    Number of children: N/A    Years of education: N/A     Occupational History    Not on file. Social History Main Topics    Smoking status: Never Smoker    Smokeless tobacco: Never Used    Alcohol use Yes      Comment: 2 DRINKS EVERY 2 MONTHS    Drug use: No    Sexual activity: Yes     Other Topics Concern    Not on file     Social History Narrative    Lives in [facility] alone.  passed away in  of a heart attack. Has 2 sons. Disability. 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 Enjoys swimming and going to the gym.      Family History  Family History Problem Relation Age of Onset    Hypertension Mother     Arthritis-osteo Mother     Lung Disease Father      COPD    Hypertension Father     Arthritis-osteo Father     Cancer Father      PROSTATE    Hypertension Brother     Elevated Lipids Brother     Arthritis-osteo Brother     Hypertension Brother     Elevated Lipids Brother     Obesity Brother     Cancer Brother      PROSTATE    Anesth Problems Son      DELAYED AWAKENING    Diabetes Maternal Grandmother     Anesth Problems Paternal Grandmother      PATIENT [address], [address] STOPPED DURING SURGERY     Medications: (reviewed)  Current Outpatient Prescriptions on File Prior to Encounter   Medication Sig Dispense Refill    cromolyn (NASALCROM) 5.2 mg/spray (4 %) nasal spray 1 [place] by Both Nostrils route four (4) times daily. 13 mL 1    guaiFENesin-codeine (ROBITUSSIN AC) 100-10 mg/5 mL solution Take 10 mL by mouth four (4) times daily as needed for Cough or Congestion. Max Daily Amount: 40 mL. 1 Bottle 0    omega-3 acid ethyl esters (LOVAZA) 1 gram capsule TAKE ONE CAPSULE BY MOUTH TWICE A  Cap 2    valsartan (DIOVAN) 80 mg tablet TAKE 1 TAB BY MOUTH DAILY. 90 Tab 2    LINZESS 145 mcg cap capsule TAKE 1 CAPSULE BY MOUTH EVERY DAY 30 Cap 0    montelukast (SINGULAIR) 10 mg tablet TAKE 1 TAB BY MOUTH DAILY. 90 Tab 1    Dexlansoprazole (DEXILANT) 60 mg CpDB Take 1 Cap by mouth daily. 90 Cap 3    lidocaine-prilocaine (EMLA) topical cream Apply small amount over port area one hour before chemo treatment and cover with a Band-Aid 30 g 1    HYDROcodone-acetaminophen (NORCO) 5-325 mg per tablet Take 1 Tab by mouth every four (4) hours as needed for Pain. Max Daily Amount: 6 Tabs. 30 Tab 0    levothyroxine (SYNTHROID) 150 mcg tablet TAKE 1 TAB BY MOUTH DAILY (BEFORE BREAKFAST). --[facility] [phone] FOR APPOINTMENT FOR MORE REFILLS 90 Tab 0    Liraglutide (VICTOZA) 0.6 mg/0.1 mL (18 mg/3 mL) pnij 1.8 mg by SubCUTAneous route daily (with lunch). 3 Pen 3    insulin detemir (LEVEMIR FLEXTOUCH) 100 unit/mL (3 mL) inpn INJECT 30 UNITS IN AM AND 50 UNITS AT NIGHT. INCREASE AS DIRECTED TO  UNITS PER DAY--CALL [phone] FOR APPOINTMENT FOR MORE REFILLS 30 mL 3    insulin aspart (NOVOLOG FLEXPEN) 100 unit/mL inpn INJECT 20 UNITS BEFORE EACH MEAL + 4 UNITS FOR EVERY 50 MG/DL ABOVE 150 MG/DL.  UNITS PER DAY 30 mL 11    potassium chloride (KLOR-CON M10) 10 mEq tablet TAKE 1 TAB BY MOUTH TWO (2) DAYS A WEEK. WED AND FRI 90 Tab 1    SYMBICORT 160-4.5 mcg/actuation HFAA Take 2 Puffs by inhalation two (2) times daily as needed. 2 Inhaler 3    albuterol (PROVENTIL VENTOLIN) 2.5 mg /3 mL (0.083 %) nebulizer solution USE 1 VAIL VIA NEBULIZER EVERY 4 HOURS AS NEEDED FOR WHEEZING 3 Package 1    ondansetron (ZOFRAN ODT) 8 mg disintegrating tablet Take 1 Tab by mouth every eight (8) hours as needed for Nausea. 30 Tab 3    fluticasone (FLONASE) 50 mcg/actuation nasal spray 2 Sprays by Both Nostrils route daily as needed.  clonazePAM (KLONOPIN) 0.5 mg tablet Take 0.5 mg by mouth nightly as needed.  sertraline (ZOLOFT) 50 mg tablet TAKE 1 TABLET BY MOUTH EVERY MORNING  1    cholecalciferol, VITAMIN D3, (VITAMIN D3) 5,000 unit tab tablet Take 5,000 Units by mouth daily.  furosemide (LASIX) 40 mg tablet Take 40 mg by mouth daily as needed (edema; do not exceed 2-3 doses per week). 3     No current facility-administered medications on file prior to encounter. Allergies: (reviewed)  Allergies   Allergen Reactions    Iodinated Contrast- Oral And Iv Dye Rash     13 hr pre-medication prior to IV contrast    Carboplatin Other (comments)     Patient developed shortness of breath, felt faint, coughing, skin flushed and \"itchy\". This occurred on the patients 8th treatment.     Lipitor [Atorvastatin] Myalgia    Percocet [Oxycodone-Acetaminophen] Other (comments)     fever    Shellfish Containing Products Hives    Tape [Adhesive] Itching and Other (comments)     \"op site-- clear,thin tape\"--caused blister     Tomato Hives       OBJECTIVE  Physical Exam  Visit Vitals    /81 (BP 1 Location: Right arm, BP Patient Position: At rest;Sitting)    Pulse 77    Temp 98 °F (36.7 °C)    Resp 18    Ht 5' 8.9\" (1.75 m)    Wt 308 lb (139.7 kg)    LMP 09/07/2011    BMI 45.62 kg/m2       GENERAL BRIAN: Conversant, alert, oriented. NAD   HEENT: Normocephalic. Oropharynx clear. Gingiva w/o edema/erythema or drainage around left upper dental margins. Nontender facial palpation. Neck: Supple. Trachea midline, no JVD. No adenopathy   RESPIRATORY: Lung CTA, No rales/rhonchi. Adequate respiratory effort. CARDIOVASC: Reg rate/rhythm, stable murmur low grade. No visible, grossly palpable lesion. GASTROINT: soft, non-tender, without masses or organomegaly, no fluid wave. Habitus limits exam.   MUSCULOSKEL: FROM. No focal tenderness. EXTREMITIES: bilat 2+ edema/girth nonpitting   PELVIC: Exam deferred. RECTAL: Deferred   ALCON SURVEY: Negative   NEURO: Grossly intact, no acute deficit. Oriented. Not depressed. Not agitated. Wt Readings from Last 3 Encounters:   07/31/18 308 lb (139.7 kg)   07/24/18 299 lb 9.6 oz (135.9 kg)   07/17/18 305 lb 12.8 oz (138.7 kg)       DATE REVIEW as available:  Lab Results   Component Value Date/Time    WBC 9.6 07/31/2018 08:35 AM    Hemoglobin (POC) 10.5 (L) 03/20/2017 12:15 PM    HGB 8.6 (L) 07/31/2018 08:35 AM    Hematocrit (POC) 31 (L) 03/20/2017 12:15 PM    HCT 28.4 (L) 07/31/2018 08:35 AM    PLATELET 917 61/36/7079 08:35 AM    MCV 97.9 07/31/2018 08:35 AM     Lab Results   Component Value Date/Time    ABS.  NEUTROPHILS 6.2 07/31/2018 08:35 AM     Lab Results   Component Value Date/Time    Sodium 141 07/31/2018 08:35 AM    Potassium 4.2 07/31/2018 08:35 AM    Chloride 109 (H) 07/31/2018 08:35 AM    CO2 26 07/31/2018 08:35 AM    Anion gap 6 07/31/2018 08:35 AM    Glucose 143 (H) 07/31/2018 08:35 AM    BUN 20 07/31/2018 08:35 AM    Creatinine 1.26 (H) 07/31/2018 08:35 AM    BUN/Creatinine ratio 16 07/31/2018 08:35 AM    GFR est AA 53 (L) 07/31/2018 08:35 AM    GFR est non-AA 44 (L) 07/31/2018 08:35 AM    Calcium 8.7 07/31/2018 08:35 AM    Bilirubin, total 0.4 07/31/2018 08:35 AM    AST (SGOT) 27 07/31/2018 08:35 AM    Alk. phosphatase 75 07/31/2018 08:35 AM    Protein, total 8.1 07/31/2018 08:35 AM    Albumin 3.2 (L) 07/31/2018 08:35 AM    Globulin 4.9 (H) 07/31/2018 08:35 AM    A-G Ratio 0.7 (L) 07/31/2018 08:35 AM    ALT (SGPT) 22 07/31/2018 08:35 AM       ECHO 7/17/18  Left ventricle: Systolic function was normal. Ejection fraction was estimated in the range of 55 % to 60 %. There were no regional wall motion  abnormalities. Wall thickness was mildly increased. Left atrium: The atrium was mildly dilated. Mitral valve: There was mild regurgitation. Aortic valve: Leaflets exhibited sclerosis without stenosis. Not well visualized. IMPRESSION AND PLAN:  Reyna Stearns has a working diagnosis of     ICD-10-CM ICD-9-CM    1. Malignant neoplasm of ovary, unspecified laterality ([facility] [address] C56.9 183.0    2. HCV antibody positive R76.8 795.79 HCV AB W/RFLX TO VANE   3. Chest wall mass R22.2 786.6    4. On antineoplastic chemotherapy Z79.899 V58.69    5. Type 2 diabetes mellitus with nephropathy (HCC) E11.21 250.40      583.81    6. Lymphadenopathy, mediastinal R59.0 785.6    7. Stage 3 chronic kidney disease N18.3 585. 3    Karnofsky Score: 100   ECOG stGstrstastdstest:st st1st Continue salvage therapy Taxol cycle 4. Improved CA titer and most recent CT scan with extensive metastatic disease. Remains essentially asx from disease. HA/cracked tooth - no infection. Break following this cycle for extraction. CKD - stage 2-3. Continue to monitor renal function - gentle hydration today, at home encouraged. Monitor wt/volume status. Hold Lasix today  Anemia - persistent, improved from hospitalization.  Anticipate transfusion need with future chemotherapy. IDDM2/SHABANA/depression - Managing her disease(s) well, remains fully active and independent.     BRCA neg - Will consider 'catch-up\" panel testing       MALLIKA Jimenez  7/31/2018/10:16 AM

## 2018-08-03 ENCOUNTER — OFFICE VISIT (OUTPATIENT)
Dept: INTERNAL MEDICINE CLINIC | Age: 58
End: 2018-08-03

## 2018-08-03 VITALS
RESPIRATION RATE: 18 BRPM | HEART RATE: 83 BPM | BODY MASS INDEX: 43.4 KG/M2 | WEIGHT: 293 LBS | OXYGEN SATURATION: 98 % | DIASTOLIC BLOOD PRESSURE: 83 MMHG | SYSTOLIC BLOOD PRESSURE: 136 MMHG | HEIGHT: 69 IN | TEMPERATURE: 98.3 F

## 2018-08-03 DIAGNOSIS — E11.21 TYPE 2 DIABETES MELLITUS WITH NEPHROPATHY (HCC): ICD-10-CM

## 2018-08-03 DIAGNOSIS — I10 BENIGN ESSENTIAL HYPERTENSION: Primary | ICD-10-CM

## 2018-08-03 RX ORDER — OLMESARTAN MEDOXOMIL 20 MG/1
20 TABLET ORAL DAILY
Qty: 30 TAB | Refills: 2 | Status: SHIPPED | OUTPATIENT
Start: 2018-08-03 | End: 2018-08-08 | Stop reason: SINTOL

## 2018-08-03 NOTE — MR AVS SNAPSHOT
[address]  [address] [facility]. 
[phone] Patient: Lilibeth Mancilla MRN: [id number] :[id number] Visit Information Date & Time Provider Department Dept. Phone Encounter #  
 8/3/2018  3:00 PM Yenni Phillips, [facility] [phone] [id number] Follow-up Instructions Return if symptoms worsen or fail to improve. Your Appointments 2018  9:45 AM  
CHEMOTHERAPY with Humble Martin MD  
[address] [place] [address] Oncology [facility] Appt Note: no labs today. ..for C5D1 taxol on  at [address] [place] [address] [phone]  
[address] [facility] 2018  2:15 PM  
CHEMOTHERAPY with Harshad López MD  
[facility] Gynecologic Oncology [facility] Appt Note: no labs today. .for C6D1 taxol at [address] on  [address] [place] [address] [facility] [address] [phone]  
[facility] [phone]  
  
    
 10/16/2018  1:30 PM  
CHEMOTHERAPY with Humble Martin MD  
[address] [place] [address] Oncology [facility]) Appt Note: no labs today. .post N7I68 Taxol [address] [place] [address] [phone] Upcoming Health Maintenance Date Due DTaP/Tdap/Td series (1 - Tdap) 1982 COLONOSCOPY 10/14/2016 PAP AKA CERVICAL CYTOLOGY 2017 Influenza Age 5 to Adult 2018 MEDICARE YEARLY EXAM 8/3/2018 BREAST CANCER SCRN MAMMOGRAM 2018* HEMOGLOBIN A1C Q6M 2018 Pneumococcal 19-64 Highest Risk (2 of 3 - PCV13) 10/24/2018 FOOT EXAM Q1 2018 EYE EXAM RETINAL OR DILATED Q1 3/23/2019 MICROALBUMIN Q1 3/28/2019 LIPID PANEL Q1 3/28/2019 *Topic was postponed. The date shown is not the original due date. Allergies as of 8/3/2018  Review Complete On: 7/31/2018 By: Serena Butcher Severity Noted Reaction Type Reactions Iodinated Contrast- Oral And Iv Dye Medium 09/22/2017   Systemic Rash 13 hr pre-medication prior to IV contrast  
 Carboplatin  08/29/2014    Other (comments) Patient developed shortness of breath, felt faint, coughing, skin flushed and \"itchy\". This occurred on the patients 8th treatment. Lipitor [Atorvastatin]  04/04/2014    Myalgia Losartan  08/03/2018    Other (comments)  
 headaches Percocet [Oxycodone-acetaminophen]  03/25/2012   Not Verified Other (comments) fever Shellfish Containing Products  10/04/2012    Hives Tape [Adhesive]  03/16/2017    Itching, Other (comments) \"op site-- clear,thin tape\"--caused blister Tomato  12/05/2017    Hives Current Immunizations  Reviewed on 7/31/2018 Name Date Influenza Vaccine (Quad) PF 12/13/2016 Not reviewed this visit You Were Diagnosed With   
  
 Codes Comments Benign essential hypertension    -  Primary ICD-10-CM: I10 
ICD-9-CM: 401.1 Type 2 diabetes mellitus with nephropathy (HCC)     ICD-10-CM: E11.21 
ICD-9-CM: 250.40, 583.81 Vitals BP Pulse Temp Resp Height(growth percentile) Weight(growth percentile) 136/83 (BP 1 Location: Left arm, BP Patient Position: Sitting) 83 98.3 °F (36.8 °C) (Oral) 18 5' 8.9\" (1.75 m) 308 lb (139.7 kg) LMP SpO2 BMI OB Status Smoking Status 09/07/2011 98% 45.62 kg/m2 Hysterectomy Never Smoker Vitals History BMI and BSA Data Body Mass Index Body Surface Area  
 45.62 kg/m 2 2.61 m 2 Preferred Pharmacy Pharmacy Name Phone [id number] [place] [phone] Your Updated Medication List  
  
   
This list is accurate as of 8/3/18  4:39 PM.  Always use your most recent med list.  
  
  
  
  
 albuterol 2.5 mg /3 mL (0.083 %) nebulizer solution Commonly known as:  PROVENTIL VENTOLIN  
USE 1 VAIL VIA NEBULIZER EVERY 4 HOURS AS NEEDED FOR WHEEZING  
  
 cholecalciferol (VITAMIN D3) 5,000 unit Tab tablet Commonly known as:  VITAMIN D3 Take 5,000 Units by mouth daily. clonazePAM 0.5 mg tablet Commonly known as:  Melody Cos Take 0.5 mg by mouth nightly as needed. cromolyn 5.2 mg/spray (4 %) nasal spray Commonly known as:  NASALCROM  
1 [place] by Both Nostrils route four (4) times daily. Dexlansoprazole 60 mg Cpdb Commonly known as:  DEXILANT Take 1 Cap by mouth daily. fluticasone 50 mcg/actuation nasal spray Commonly known as:  Elihue Caller 2 Sprays by Both Nostrils route daily as needed. furosemide 40 mg tablet Commonly known as:  LASIX Take 40 mg by mouth daily as needed (edema; do not exceed 2-3 doses per week). guaiFENesin-codeine 100-10 mg/5 mL solution Commonly known as:  ROBITUSSIN AC Take 10 mL by mouth four (4) times daily as needed for Cough or Congestion. Max Daily Amount: 40 mL. HYDROcodone-acetaminophen 5-325 mg per tablet Commonly known as:  Najma Petri Take 1 Tab by mouth every four (4) hours as needed for Pain. Max Daily Amount: 6 Tabs. insulin aspart U-100 100 unit/mL Inpn Commonly known as:  NovoLOG Flexpen U-100 Insulin INJECT 20 UNITS BEFORE EACH MEAL + 4 UNITS FOR EVERY 50 MG/DL ABOVE 150 MG/DL.  UNITS PER DAY  
  
 insulin detemir U-100 100 unit/mL (3 mL) Inpn Commonly known as:  LEVEMIR FLEXTOUCH U-100 INSULN INJECT 30 UNITS IN AM AND 50 UNITS AT NIGHT. INCREASE AS DIRECTED TO  UNITS PER DAY--CALL [phone] FOR APPOINTMENT FOR MORE REFILLS  
  
 levothyroxine 150 mcg tablet Commonly known as:  SYNTHROID  
TAKE 1 TAB BY MOUTH DAILY (BEFORE BREAKFAST). --BFQD [phone] FOR APPOINTMENT FOR MORE REFILLS  
  
 lidocaine-prilocaine topical cream  
Commonly known as:  EMLA Apply small amount over port area one hour before chemo treatment and cover with a Band-Aid LINZESS 145 mcg Cap capsule Generic drug:  linaclotide TAKE 1 CAPSULE BY MOUTH EVERY DAY Liraglutide 0.6 mg/0.1 mL (18 mg/3 mL) Pnij Commonly known as:  VICTOZA  
1.8 mg by SubCUTAneous route daily (with lunch). montelukast 10 mg tablet Commonly known as:  SINGULAIR  
TAKE 1 TAB BY MOUTH DAILY. olmesartan 20 mg tablet Commonly known as:  [employer] Take 1 Tab by mouth daily. omega-3 acid ethyl esters 1 gram capsule Commonly known as:  Anand Hopes TAKE ONE CAPSULE BY MOUTH TWICE A DAY  
  
 ondansetron 8 mg disintegrating tablet Commonly known as:  ZOFRAN ODT Take 1 Tab by mouth every eight (8) hours as needed for Nausea. potassium chloride 10 mEq tablet Commonly known as:  KLOR-CON M10  
TAKE 1 TAB BY MOUTH TWO (2) DAYS A WEEK. WED AND FRI  
  
 sertraline 50 mg tablet Commonly known as:  ZOLOFT  
TAKE 1 TABLET BY MOUTH EVERY MORNING  
  
 SYMBICORT 160-4.5 mcg/actuation Hfaa Generic drug:  budesonide-formoterol Take 2 Puffs by inhalation two (2) times daily as needed. Prescriptions Sent to Pharmacy Refills  
 olmesartan (BENICAR) 20 mg tablet 2 Sig: Take 1 Tab by mouth daily. Class: Normal  
 Pharmacy: [id number]- [address]  #: [phone] Route: Oral  
  
Follow-up Instructions Return if symptoms worsen or fail to improve. To-Do List   
 08/07/2018  8:00 AM  
  [facility] [place] [address] [facility] ([phone])  
  
 08/14/2018 10:00 AM  
  Appointment with [address] [place] [address] 1 at [facility] ([phone])  
  
 08/28/2018 10:00 AM  
  Appointment with [address] [place] [address] at [facility] ([phone])  
  
 09/04/2018 10:00 AM  
  Appointment with [address] [place] [address] 1 at [facility] ([phone]) 09/17/2018 11:00 AM  
  Appointment with [facility] [address] at [address] ([phone]) Introducing [facility]! Dear Neetu Michele: Thank you for requesting a [address] account. Our records indicate that you already have an active [address] account. You can access your account anytime at [email or web address] Did you know that you can access your hospital and ER discharge instructions at any time in [address]? You can also review all of your test results from your hospital stay or ER visit. Additional Information If you have questions, please visit the Frequently Asked Questions section of the [employer] [address] website at [email or web address] Remember, [address] is NOT to be used for urgent needs. For medical emergencies, dial 911. Now available from your iPhone and Android! Please provide this summary of care documentation to your next provider. Your primary care clinician is listed as [place]. If you have any questions after today's visit, please call [phone].

## 2018-08-03 NOTE — PROGRESS NOTES
SUBJECTIVE:   Ms. Mortimer Ferguson is a 63 y.o. female who is here for [facility] after admission to [facility] 7/17/18-7/19/18 for CP and SOB. Prior to discharge, she was treated for presumptive conjunctivitis due to a crusted right eye. Pt states sx's improved since discharge and her energy is returned. Pt reports Dr. Princess Borja plans to repeat her chest CT to assess the change in size of her chest mass. Pt reports she received a letter from her pharmacist for her valsartan. She states she experienced HAs with losartan. Pt reports her blood glucose has been good and she was told her DM could be managed with the PCP rather than endocrinologist.    Pt reports she needs a root canal, but due to her chemotherapy schedule is opting to have the tooth pulled. Pt's PCP is Dr. Chai Alexander. At this time, she is otherwise doing well and has brought no other complaints to my attention today. For a list of the medical issues addressed today, see the assessment and plan below. PMH:   Past Medical History:   Diagnosis Date    Adverse effect of anesthesia     sleep apnea cpap machine useage     Anemia     Arthritis     DDD low back and knees    Asthma     uses albuterol prn only; none x 6 mos.   Never hospitalized    BRCA negative 1/2013    Calculus of kidney     Chronic kidney disease     kidney stones    Chronic pain     knee pain bilat    CINV (chemotherapy-induced nausea and vomiting) 8/21/2014    Decreased hearing, right     PATIENT STATES THIS IS DUE TO CHEMOTHERAPY    Diabetes ([facility] Age 46    IDDM    Environmental allergies     Fibromyalgia     GERD (gastroesophageal reflux disease)     controlled with med    Hx of pulmonary embolus during pregnancy 1/18/2015    Hypertension     Ill-defined condition     Sepsis 9-2015 -  SOURCE PORT    Ill-defined condition 2016    chemotherapy     Mass of chest wall 05/2018    Morbid obesity (HCC)     Murmur, heart     Other and unspecified hyperlipidemia     [facility] 9/2012, 1/2014    serous, high grade, stage IIIc. s/p chemotx (has port)    Psychiatric disorder     Rectal bleeding     Th[facility]boembolus ([facility] 1993    POST PARTUM; resolved- PELVIS    Thyroid disease     HYPOTHYROID    Unspecified sleep apnea     CPAP, Dr. Ever Chavez     PSH:  has a past surgical history that includes hx vascular access (11/4/2015); hx urological; hx gyn; hx hysterectomy (9/2012); pr breast surgery procedure unlisted; hx orthopaedic; hx orthopaedic; and pr chest surgery procedure unlisted. All: is allergic to iodinated contrast- oral and iv dye; carboplatin; lipitor [atorvastatin]; losartan; percocet [oxycodone-acetaminophen]; shellfish containing products; tape [adhesive]; and tomato. MEDS:   Current Outpatient Prescriptions   Medication Sig    olmesartan (BENICAR) 20 mg tablet Take 1 Tab by mouth daily.  cromolyn (NASALCROM) 5.2 mg/spray (4 %) nasal spray 1 [place] by Both Nostrils route four (4) times daily.  guaiFENesin-codeine (ROBITUSSIN AC) 100-10 mg/5 mL solution Take 10 mL by mouth four (4) times daily as needed for Cough or Congestion. Max Daily Amount: 40 mL.  omega-3 acid ethyl esters (LOVAZA) 1 gram capsule TAKE ONE CAPSULE BY MOUTH TWICE A DAY    LINZESS 145 mcg cap capsule TAKE 1 CAPSULE BY MOUTH EVERY DAY    montelukast (SINGULAIR) 10 mg tablet TAKE 1 TAB BY MOUTH DAILY.  Dexlansoprazole (DEXILANT) 60 mg CpDB Take 1 Cap by mouth daily.  lidocaine-prilocaine (EMLA) topical cream Apply small amount over port area one hour before chemo treatment and cover with a Band-Aid    HYDROcodone-acetaminophen (NORCO) 5-325 mg per tablet Take 1 Tab by mouth every four (4) hours as needed for Pain. Max Daily Amount: 6 Tabs.  levothyroxine (SYNTHROID) 150 mcg tablet TAKE 1 TAB BY MOUTH DAILY (BEFORE BREAKFAST). --SIVAKUMAR [phone] FOR APPOINTMENT FOR MORE REFILLS    Liraglutide (VICTOZA) 0.6 mg/0.1 mL (18 mg/3 mL) pnij 1.8 mg by SubCUTAneous route daily (with lunch).  insulin detemir (LEVEMIR FLEXTOUCH) 100 unit/mL (3 mL) inpn INJECT 30 UNITS IN AM AND 50 UNITS AT NIGHT. INCREASE AS DIRECTED TO  UNITS PER DAY--CALL [phone] FOR APPOINTMENT FOR MORE REFILLS    insulin aspart (NOVOLOG FLEXPEN) 100 unit/mL inpn INJECT 20 UNITS BEFORE EACH MEAL + 4 UNITS FOR EVERY 50 MG/DL ABOVE 150 MG/DL.  UNITS PER DAY    potassium chloride (KLOR-CON M10) 10 mEq tablet TAKE 1 TAB BY MOUTH TWO (2) DAYS A WEEK. WED AND FRI    SYMBICORT 160-4.5 mcg/actuation HFAA Take 2 Puffs by inhalation two (2) times daily as needed.  ondansetron (ZOFRAN ODT) 8 mg disintegrating tablet Take 1 Tab by mouth every eight (8) hours as needed for Nausea.  fluticasone (FLONASE) 50 mcg/actuation nasal spray 2 Sprays by Both Nostrils route daily as needed.  clonazePAM (KLONOPIN) 0.5 mg tablet Take 0.5 mg by mouth nightly as needed.  sertraline (ZOLOFT) 50 mg tablet TAKE 1 TABLET BY MOUTH EVERY MORNING    cholecalciferol, VITAMIN D3, (VITAMIN D3) 5,000 unit tab tablet Take 5,000 Units by mouth daily.  furosemide (LASIX) 40 mg tablet Take 40 mg by mouth daily as needed (edema; do not exceed 2-3 doses per week).  albuterol (PROVENTIL VENTOLIN) 2.5 mg /3 mL (0.083 %) nebulizer solution USE 1 VAIL VIA NEBULIZER EVERY 4 HOURS AS NEEDED FOR WHEEZING     No current facility-administered medications for this visit. FH: family history includes Anesth Problems in her paternal grandmother and son; Arthritis-osteo in her brother, father, and mother; Cancer in her brother and father; Diabetes in her maternal grandmother; Elevated Lipids in her brother and brother; Hypertension in her brother, brother, father, and mother; Lung Disease in her father; Obesity in her brother. SH:  reports that she has never smoked. She has never used smokeless tobacco. She reports that she drinks alcohol. She reports that she does not use illicit drugs.      Review of Systems - History obtained from the patient  General ROS: no fever, chills, fatigue, body aches  Psychological ROS: no change in anxiety, depression, SI/HI  Ophthalmic ROS: no blurred vision, myopia, double vision  ENT ROS: no dysphagia, otalgia, otorrhea, rhinorrhea, post nasal drip  Respiratory ROS: no cough, shortness of breath, or wheezing  Cardiovascular ROS: no chest pain or dyspnea on exertion  Gastrointestinal ROS: no abdominal pain, change in bowel habits, or black or bloody stools  Genito-Urinary ROS: no frequency, urgency, incontinence, dysuria, hematouria  Musculoskeletal ROS: no arthralagia, myalgia  Neurological ROS: no headaches, dizziness, lightheadedness, tremors, seizures  Dermatological ROS: no rash or lesions    OBJECTIVE:   Vitals:   Visit Vitals    /83 (BP 1 Location: Left arm, BP Patient Position: Sitting)    Pulse 83    Temp 98.3 °F (36.8 °C) (Oral)    Resp 18    Ht 5' 8.9\" (1.75 m)    Wt 308 lb (139.7 kg)    LMP 09/07/2011    SpO2 98%    BMI 45.62 kg/m2      Gen: Pleasant 63 y.o.  female in NAD. HEENT: PERRLA. EOMI. OP moist and pink. Neck: Supple. No LAD. HEART: RRR, No M/G/R.      LUNGS: CTAB No W/R. ABDOMEN: S, NT, ND, BS+. EXTREMITIES: Warm. No C/C/E.    MUSCULOSKELETAL: Normal ROM, muscle strength 5/5 all groups. NEURO: Alert and oriented x 3. Cranial nerves grossly intact. No focal sensory or motor deficits noted. SKIN: Warm. Dry. No rashes or other lesions noted. ASSESSMENT/ PLAN: Diagnoses and all orders for this visit:    1. Benign essential hypertension  -     olmesartan (BENICAR) 20 mg tablet; Take 1 Tab by mouth daily. 2. Type 2 diabetes mellitus with nephropathy ([facility]        ICD-10-CM ICD-9-CM    1. Benign essential hypertension I10 401.1 olmesartan (BENICAR) 20 mg tablet   2. Type 2 diabetes mellitus with nephropathy (HCC) E11.21 250.40      583.81         1.  Hypertension  I altered pt's valsartan prescription to olmesartan (20mg, but starting with 10mg). I advised pt to monitor her BP at home and f/u for any alterations to the medication. 2. DM type 2  I advised pt to continue current dose of levemir and novolog, avoid sugars and starches, and to increase exercise when possible. Follow-up Disposition:  Return if symptoms worsen or fail to improve. I have reviewed the patient's medications and risks/side effects/benefits were discussed. Diagnosis(-es) explained to patient and questions answered. Literature provided where appropriate.      Written by Leigh Boyce, as dictated by Josh Jimenez MD.

## 2018-08-07 ENCOUNTER — HOSPITAL ENCOUNTER (OUTPATIENT)
Dept: INFUSION THERAPY | Age: 58
Discharge: HOME | End: 2018-08-07
Payer: MEDICARE

## 2018-08-07 VITALS
BODY MASS INDEX: 43.4 KG/M2 | HEIGHT: 69 IN | DIASTOLIC BLOOD PRESSURE: 86 MMHG | SYSTOLIC BLOOD PRESSURE: 124 MMHG | HEART RATE: 77 BPM | OXYGEN SATURATION: 95 % | RESPIRATION RATE: 18 BRPM | TEMPERATURE: 97.4 F | WEIGHT: 293 LBS

## 2018-08-07 DIAGNOSIS — C56.9 MALIGNANT NEOPLASM OF OVARY, UNSPECIFIED LATERALITY (HCC): ICD-10-CM

## 2018-08-07 LAB
BASOPHILS # BLD: 0.1 K/UL (ref 0–0.1)
BASOPHILS NFR BLD: 1 % (ref 0–1)
DIFFERENTIAL METHOD BLD: ABNORMAL
EOSINOPHIL # BLD: 0.5 K/UL (ref 0–0.4)
EOSINOPHIL NFR BLD: 7 % (ref 0–7)
ERYTHROCYTE [DISTWIDTH] IN BLOOD BY AUTOMATED COUNT: 14.8 % (ref 11.5–14.5)
HCT VFR BLD AUTO: 27.5 % (ref 35–47)
HGB BLD-MCNC: 8.3 G/DL (ref 11.5–16)
IMM GRANULOCYTES # BLD: 0.1 K/UL (ref 0–0.04)
IMM GRANULOCYTES NFR BLD AUTO: 1 % (ref 0–0.5)
LYMPHOCYTES # BLD: 2.4 K/UL (ref 0.8–3.5)
LYMPHOCYTES NFR BLD: 33 % (ref 12–49)
MCH RBC QN AUTO: 29.7 PG (ref 26–34)
MCHC RBC AUTO-ENTMCNC: 30.2 G/DL (ref 30–36.5)
MCV RBC AUTO: 98.6 FL (ref 80–99)
MONOCYTES # BLD: 0.5 K/UL (ref 0–1)
MONOCYTES NFR BLD: 7 % (ref 5–13)
NEUTS SEG # BLD: 3.7 K/UL (ref 1.8–8)
NEUTS SEG NFR BLD: 51 % (ref 32–75)
NRBC # BLD: 0 K/UL (ref 0–0.01)
NRBC BLD-RTO: 0 PER 100 WBC
PLATELET # BLD AUTO: 181 K/UL (ref 150–400)
PMV BLD AUTO: 9.3 FL (ref 8.9–12.9)
RBC # BLD AUTO: 2.79 M/UL (ref 3.8–5.2)
WBC # BLD AUTO: 7.2 K/UL (ref 3.6–11)

## 2018-08-07 PROCEDURE — 74011000250 HC RX REV CODE- 250: Performed by: OBSTETRICS & GYNECOLOGY

## 2018-08-07 PROCEDURE — 36415 COLL VENOUS BLD VENIPUNCTURE: CPT | Performed by: OBSTETRICS & GYNECOLOGY

## 2018-08-07 PROCEDURE — 96413 CHEMO IV INFUSION 1 HR: CPT

## 2018-08-07 PROCEDURE — 74011250636 HC RX REV CODE- 250/636: Performed by: OBSTETRICS & GYNECOLOGY

## 2018-08-07 PROCEDURE — 96375 TX/PRO/DX INJ NEW DRUG ADDON: CPT

## 2018-08-07 PROCEDURE — 85025 COMPLETE CBC W/AUTO DIFF WBC: CPT | Performed by: OBSTETRICS & GYNECOLOGY

## 2018-08-07 PROCEDURE — 77030012965 HC NDL HUBR BBMI -A

## 2018-08-07 RX ORDER — SODIUM CHLORIDE 0.9 % (FLUSH) 0.9 %
10 SYRINGE (ML) INJECTION AS NEEDED
Status: ACTIVE | OUTPATIENT
Start: 2018-08-07 | End: 2018-08-07

## 2018-08-07 RX ORDER — DIPHENHYDRAMINE HYDROCHLORIDE 50 MG/ML
50 INJECTION, SOLUTION INTRAMUSCULAR; INTRAVENOUS ONCE
Status: COMPLETED | OUTPATIENT
Start: 2018-08-07 | End: 2018-08-07

## 2018-08-07 RX ORDER — HEPARIN 100 UNIT/ML
300-500 SYRINGE INTRAVENOUS AS NEEDED
Status: ACTIVE | OUTPATIENT
Start: 2018-08-07 | End: 2018-08-07

## 2018-08-07 RX ORDER — SODIUM CHLORIDE 9 MG/ML
500 INJECTION, SOLUTION INTRAVENOUS CONTINUOUS
Status: DISPENSED | OUTPATIENT
Start: 2018-08-07 | End: 2018-08-07

## 2018-08-07 RX ORDER — DEXAMETHASONE SODIUM PHOSPHATE 4 MG/ML
10 INJECTION, SOLUTION INTRA-ARTICULAR; INTRALESIONAL; INTRAMUSCULAR; INTRAVENOUS; SOFT TISSUE ONCE
Status: COMPLETED | OUTPATIENT
Start: 2018-08-07 | End: 2018-08-07

## 2018-08-07 RX ORDER — SODIUM CHLORIDE 9 MG/ML
10 INJECTION INTRAMUSCULAR; INTRAVENOUS; SUBCUTANEOUS AS NEEDED
Status: ACTIVE | OUTPATIENT
Start: 2018-08-07 | End: 2018-08-07

## 2018-08-07 RX ADMIN — Medication 10 ML: at 08:33

## 2018-08-07 RX ADMIN — PACLITAXEL 120 MG: 6 INJECTION, SOLUTION INTRAVENOUS at 11:16

## 2018-08-07 RX ADMIN — SODIUM CHLORIDE 500 ML: 900 INJECTION, SOLUTION INTRAVENOUS at 08:33

## 2018-08-07 RX ADMIN — SODIUM CHLORIDE 10 ML: 9 INJECTION INTRAMUSCULAR; INTRAVENOUS; SUBCUTANEOUS at 12:24

## 2018-08-07 RX ADMIN — SODIUM CHLORIDE, PRESERVATIVE FREE 500 UNITS: 5 INJECTION INTRAVENOUS at 12:24

## 2018-08-07 RX ADMIN — SODIUM CHLORIDE 10 ML: 9 INJECTION INTRAMUSCULAR; INTRAVENOUS; SUBCUTANEOUS at 08:33

## 2018-08-07 RX ADMIN — DEXAMETHASONE SODIUM PHOSPHATE 10 MG: 4 INJECTION, SOLUTION INTRA-ARTICULAR; INTRALESIONAL; INTRAMUSCULAR; INTRAVENOUS; SOFT TISSUE at 10:29

## 2018-08-07 RX ADMIN — FAMOTIDINE 20 MG: 10 INJECTION, SOLUTION INTRAVENOUS at 10:35

## 2018-08-07 RX ADMIN — DIPHENHYDRAMINE HYDROCHLORIDE 50 MG: 50 INJECTION INTRAMUSCULAR; INTRAVENOUS at 10:32

## 2018-08-07 NOTE — PROGRESS NOTES
Outpatient Infusion Center - Chemotherapy Progress Note    0820 Pt admit to [facility] for Cycle 4 Day 8 Taxol ambulatory in stable condition. Assessment completed. No new concerns voiced. Port accessed with positive blood return. Labs drawn and sent for processing. Port flushed and NS started at [facility]. Chemotherapy Flowsheet 8/7/2018   Cycle C4D8   Date 8/7/2018   Drug / Regimen Taxol   Dosage -   Pre Hydration -   Pre Meds given   Notes given         Visit Vitals    /86 (BP 1 Location: Left arm, BP Patient Position: Sitting)    Pulse 77    Temp 97.4 °F (36.3 °C)    Resp 18    Ht 5' 8.9\" (1.75 m)    Wt 141.7 kg (312 lb 6.4 oz)    LMP 09/07/2011    SpO2 95%    Breastfeeding No    BMI 46.27 kg/m2       Medications:  NS KVO  Decadron  Pepcid  Benadryl  Taxol  NS flushes  Heparin     1225 Pt tolerated treatment well. Port maintained positive blood return throughout treatment. Flushed, heparinized and de-accessed per protocol. D/c home ambulatory in no distress. Pt aware of next appointment scheduled for 8/14/18 at 10:00. Recent Results (from the past 12 hour(s))   CBC WITH AUTOMATED DIFF    Collection Time: 08/07/18  8:32 AM   Result Value Ref Range    WBC 7.2 3.6 - 11.0 K/uL    RBC 2.79 (L) 3.80 - 5.20 M/uL    HGB 8.3 (L) 11.5 - 16.0 g/dL    HCT 27.5 (L) 35.0 - 47.0 %    MCV 98.6 80.0 - 99.0 FL    MCH 29.7 26.0 - 34.0 PG    MCHC 30.2 30.0 - 36.5 g/dL    RDW 14.8 (H) 11.5 - 14.5 %    PLATELET 339 174 - 099 K/uL    MPV 9.3 8.9 - 12.9 FL    NRBC 0.0 0  WBC    ABSOLUTE NRBC 0.00 0.00 - 0.01 K/uL    NEUTROPHILS 51 32 - 75 %    LYMPHOCYTES 33 12 - 49 %    MONOCYTES 7 5 - 13 %    EOSINOPHILS 7 0 - 7 %    BASOPHILS 1 0 - 1 %    IMMATURE GRANULOCYTES 1 (H) 0.0 - 0.5 %    ABS. NEUTROPHILS 3.7 1.8 - 8.0 K/UL    ABS. LYMPHOCYTES 2.4 0.8 - 3.5 K/UL    ABS. MONOCYTES 0.5 0.0 - 1.0 K/UL    ABS. EOSINOPHILS 0.5 (H) 0.0 - 0.4 K/UL    ABS. BASOPHILS 0.1 0.0 - 0.1 K/UL    ABS. IMM.  GRANS. 0.1 (H) 0.00 - 0.04 K/UL DF AUTOMATED

## 2018-08-08 RX ORDER — TELMISARTAN 20 MG/1
20 TABLET ORAL DAILY
Qty: 30 TAB | Refills: 3 | Status: SHIPPED | OUTPATIENT
Start: 2018-08-08 | End: 2018-10-29 | Stop reason: SDUPTHER

## 2018-08-08 NOTE — TELEPHONE ENCOUNTER
#[phone] pt states when she takes the new htn medication if makes her feel like she is having a heart attack. Pt can not take this she states. She stopped taking on 8-6-18. She couldn't take this during chemo. This was only a half pill. Please call to advise as soon as you can as b/p was high during chemo treatment.

## 2018-08-08 NOTE — TELEPHONE ENCOUNTER
Identified patient 2 identifiers verified. Patient started [facility]  Saturday, started off with 1/2 pill. Experienced chest pains, and pains on left side. No symptoms since stopping the Benicar. BP  Was 183/89 on Tuesday at [address].

## 2018-08-13 ENCOUNTER — OFFICE VISIT (OUTPATIENT)
Dept: URGENT CARE | Age: 58
End: 2018-08-13

## 2018-08-13 VITALS
DIASTOLIC BLOOD PRESSURE: 69 MMHG | HEART RATE: 79 BPM | HEIGHT: 68 IN | SYSTOLIC BLOOD PRESSURE: 143 MMHG | WEIGHT: 293 LBS | OXYGEN SATURATION: 97 % | BODY MASS INDEX: 44.41 KG/M2 | TEMPERATURE: 97.6 F | RESPIRATION RATE: 18 BRPM

## 2018-08-13 DIAGNOSIS — L03.032 PARONYCHIA OF SECOND TOE OF LEFT FOOT: Primary | ICD-10-CM

## 2018-08-13 DIAGNOSIS — Z79.4 TYPE 2 DIABETES MELLITUS WITH HYPERGLYCEMIA, WITH LONG-TERM CURRENT USE OF INSULIN (HCC): ICD-10-CM

## 2018-08-13 DIAGNOSIS — E11.65 TYPE 2 DIABETES MELLITUS WITH HYPERGLYCEMIA, WITH LONG-TERM CURRENT USE OF INSULIN (HCC): ICD-10-CM

## 2018-08-13 RX ORDER — LIRAGLUTIDE 6 MG/ML
INJECTION SUBCUTANEOUS
Qty: 9 ADJUSTABLE DOSE PRE-FILLED PEN SYRINGE | Refills: 3 | Status: SHIPPED | OUTPATIENT
Start: 2018-08-13 | End: 2018-11-27 | Stop reason: CLARIF

## 2018-08-13 RX ORDER — VALSARTAN 80 MG/1
TABLET ORAL
Refills: 4 | COMMUNITY
Start: 2018-06-24 | End: 2018-08-13

## 2018-08-13 RX ORDER — OLMESARTAN MEDOXOMIL 20 MG/1
TABLET ORAL
Refills: 2 | COMMUNITY
Start: 2018-08-03 | End: 2018-08-13

## 2018-08-13 RX ORDER — INSULIN DETEMIR 100 [IU]/ML
INJECTION, SOLUTION SUBCUTANEOUS
Qty: 30 ADJUSTABLE DOSE PRE-FILLED PEN SYRINGE | Refills: 3 | Status: SHIPPED | OUTPATIENT
Start: 2018-08-13 | End: 2018-11-27

## 2018-08-13 RX ORDER — CEPHALEXIN 500 MG/1
500 CAPSULE ORAL 4 TIMES DAILY
Qty: 40 CAP | Refills: 0 | Status: SHIPPED | OUTPATIENT
Start: 2018-08-13 | End: 2018-08-23

## 2018-08-13 RX ORDER — LINACLOTIDE 145 UG/1
CAPSULE, GELATIN COATED ORAL
Qty: 30 CAP | Refills: 0 | Status: SHIPPED | OUTPATIENT
Start: 2018-08-13 | End: 2018-09-08 | Stop reason: SDUPTHER

## 2018-08-13 NOTE — PATIENT INSTRUCTIONS
Paronychia: Care Instructions  Your Care Instructions  Paronychia (say \"aaqt-hr-OC-ashutosh-uh\") is an infection of the skin around a fingernail or toenail. It happens when germs enter through a break in the skin. The doctor may have made a small cut in the infected area to drain the pus. Most cases of paronychia improve in a few days. But watch your symptoms and follow your doctor's advice. Though rare, a mild case can turn into something more serious and infect your entire finger or toe. Also, it is possible for an infection to return. Follow-up care is a key part of your treatment and safety. Be sure to make and go to all appointments, and call your doctor if you are having problems. It's also a good idea to know your test results and keep a list of the medicines you take. How can you care for yourself at home? · If your doctor told you how to care for your infected nail, follow the doctor's instructions. If you did not get instructions, follow this general advice:  ¨ Wash the area with clean water 2 times a day. Don't use hydrogen peroxide or alcohol, which can slow healing. ¨ You may cover the area with a thin layer of petroleum jelly, such as Vaseline, and a nonstick bandage. ¨ Apply more petroleum jelly and replace the bandage as needed. · If your doctor prescribed antibiotics, take them as directed. Do not stop taking them just because you feel better. You need to take the full course of antibiotics. · Take an over-the-counter pain medicine, such as acetaminophen (Tylenol), ibuprofen (Advil, Motrin), or naproxen (Aleve). Read and follow all instructions on the label. · Do not take two or more pain medicines at the same time unless the doctor told you to. Many pain medicines have acetaminophen, which is Tylenol. Too much acetaminophen (Tylenol) can be harmful. · Prop up the toe or finger so that it is higher than the level of your heart. This will help with pain and swelling. · Apply heat.  Put a warm water bottle, heating pad set on low, or warm cloth on your finger or toe. Do not go to sleep with a heating pad on your skin. · Soak the area in warm water twice a day for 15 minutes each time. After soaking, dry the area well and apply a thin layer of petroleum jelly, such as Vaseline. Put on a new bandage. When should you call for help? Call your doctor now or seek immediate medical care if:    · You have signs of new or worsening infection, such as:  ¨ Increased pain, swelling, warmth, or redness. ¨ Red streaks leading from the infected skin. ¨ Pus draining from the area. ¨ A fever.    Watch closely for changes in your health, and be sure to contact your doctor if:    · You do not get better as expected. Where can you learn more? Go to http://shani-jasmin.info/. Enter C435 in the search box to learn more about \"Paronychia: Care Instructions. \"  Current as of: October 5, 2017  Content Version: 11.7  © 0660-4736 [employer]. Care instructions adapted under license by [employer] (which disclaims liability or warranty for this information). If you have questions about a medical condition or this instruction, always ask your healthcare professional. [address] any warranty or liability for your use of this information.

## 2018-08-13 NOTE — MR AVS SNAPSHOT
[address] [address] 
[phone] Patient: Abril Rocha MRN: [id number] :1960 Visit Information Date & Time Provider Department Dept. Phone Encounter #  
 2018  3:45 PM [facility] [phone] [id number] Follow-up Instructions Return if symptoms worsen or fail to improve, for Follow up with podiatry. Your Appointments 2018  9:45 AM  
CHEMOTHERAPY with Geetha Birch MD  
[address] Oncology [facility]) Appt Note: no labs today. ..for C5D1 taxol on  at [address] [phone]  
[address] [facility] 2018  2:15 PM  
CHEMOTHERAPY with Bruce Betts MD  
[facility]) Appt Note: no labs today. .for C6D1 taxol at [address] on  [address] [phone]  
[facility] [phone]  
  
    
 10/16/2018  1:30 PM  
CHEMOTHERAPY with Geetha Birch MD  
[facility] [address] Oncology [facility]) Appt Note: no labs today. .post D5E58 Taxol [address] [facility]- [address] [phone] Upcoming Health Maintenance Date Due DTaP/Tdap/Td series (1 - Tdap) 1981 COLONOSCOPY 10/14/2016 PAP AKA CERVICAL CYTOLOGY 2017 Influenza Age 5 to Adult 2018 MEDICARE YEARLY EXAM 8/3/2018 BREAST CANCER SCRN MAMMOGRAM 2018* HEMOGLOBIN A1C Q6M 2018 Pneumococcal 19-64 Highest Risk (2 of 3 - PCV13) 10/24/2018 FOOT EXAM Q1 2018 EYE EXAM RETINAL OR DILATED Q1 3/23/2019 MICROALBUMIN Q1 3/28/2019 LIPID PANEL Q1 3/28/2019 *Topic was postponed. The date shown is not the original due date. Allergies as of 8/13/2018  Review Complete On: 8/13/2018 By: Leslie Guardado RN Severity Noted Reaction Type Reactions Iodinated Contrast- Oral And Iv Dye Medium 09/22/2017   Systemic Rash 13 hr pre-medication prior to IV contrast  
 Carboplatin  08/29/2014    Other (comments) Patient developed shortness of breath, felt faint, coughing, skin flushed and \"itchy\". This occurred on the patients 8th treatment. Lipitor [Atorvastatin]  04/04/2014    Myalgia Losartan  08/03/2018    Other (comments)  
 headaches Olmesartan  08/08/2018    Other (comments) Percocet [Oxycodone-acetaminophen]  03/25/2012   Not Verified Other (comments) fever Shellfish Containing Products  10/04/2012    Hives Tape [Adhesive]  03/16/2017    Itching, Other (comments) \"op site-- clear,thin tape\"--caused blister Tomato  12/05/2017    Hives Current Immunizations  Reviewed on 8/7/2018 Name Date Influenza Vaccine (Quad) PF 12/13/2016 Not reviewed this visit You Were Diagnosed With   
  
 Codes Comments Paronychia of second toe of left foot    -  Primary ICD-10-CM: X25.895 
ICD-9-CM: 681.11 Vitals BP Pulse Temp Resp Height(growth percentile) Weight(growth percentile) 143/69 79 97.6 °F (36.4 °C) 18 5' 8\" (1.727 m) 312 lb (141.5 kg) LMP SpO2 BMI OB Status Smoking Status 09/07/2011 97% 47.44 kg/m2 Hysterectomy Never Smoker BMI and BSA Data Body Mass Index Body Surface Area  
 47.44 kg/m 2 2.61 m 2 Preferred Pharmacy Pharmacy Name Phone NO PHARMACY PREFERENCE Your Updated Medication List  
  
   
This list is accurate as of 8/13/18  4:18 PM.  Always use your most recent med list.  
  
  
  
  
 albuterol 2.5 mg /3 mL (0.083 %) nebulizer solution Commonly known as:  PROVENTIL VENTOLIN  
USE 1 VAIL VIA NEBULIZER EVERY 4 HOURS AS NEEDED FOR WHEEZING  
  
 cephALEXin 500 mg capsule Commonly known as:  Yvan Salazar Take 1 Cap by mouth four (4) times daily for 10 days. cholecalciferol (VITAMIN D3) 5,000 unit Tab tablet Commonly known as:  VITAMIN D3 Take 5,000 Units by mouth daily. clonazePAM 0.5 mg tablet Commonly known as:  Montesano Mauro Take 0.5 mg by mouth nightly as needed. cromolyn 5.2 mg/spray (4 %) nasal spray Commonly known as:  NASALCROM  
1 [place] by Both Nostrils route four (4) times daily. Dexlansoprazole 60 mg Cpdb Commonly known as:  DEXILANT Take 1 Cap by mouth daily. fluticasone 50 mcg/actuation nasal spray Commonly known as:  Cathlyn Darshan 2 Sprays by Both Nostrils route daily as needed. furosemide 40 mg tablet Commonly known as:  LASIX Take 40 mg by mouth daily as needed (edema; do not exceed 2-3 doses per week). HYDROcodone-acetaminophen 5-325 mg per tablet Commonly known as:  Benetta Pock Take 1 Tab by mouth every four (4) hours as needed for Pain. Max Daily Amount: 6 Tabs. insulin aspart U-100 100 unit/mL Inpn Commonly known as:  NovoLOG Flexpen U-100 Insulin INJECT 20 UNITS BEFORE EACH MEAL + 4 UNITS FOR EVERY 50 MG/DL ABOVE 150 MG/DL.  UNITS PER DAY  
  
 insulin detemir U-100 100 unit/mL (3 mL) Inpn Commonly known as:  LEVEMIR FLEXTOUCH U-100 INSULN INJECT 30 UNITS IN AM AND 50 UNITS AT NIGHT. INCREASE AS DIRECTED TO  UNITS PER DAY--CALL [phone] FOR APPOINTMENT FOR MORE REFILLS  
  
 levothyroxine 150 mcg tablet Commonly known as:  SYNTHROID  
TAKE 1 TAB BY MOUTH DAILY (BEFORE BREAKFAST). --[facility] [phone] FOR APPOINTMENT FOR MORE REFILLS  
  
 lidocaine-prilocaine topical cream  
Commonly known as:  EMLA Apply small amount over port area one hour before chemo treatment and cover with a Band-Aid LINZESS 145 mcg Cap capsule Generic drug:  linaclotide TAKE 1 CAPSULE BY MOUTH EVERY DAY Liraglutide 0.6 mg/0.1 mL (18 mg/3 mL) Pnij Commonly known as:  VICTOZA  
1.8 mg by SubCUTAneous route daily (with lunch). montelukast 10 mg tablet Commonly known as:  SINGULAIR  
TAKE 1 TAB BY MOUTH DAILY. omega-3 acid ethyl esters 1 gram capsule Commonly known as:  Thersa Amanda TAKE ONE CAPSULE BY MOUTH TWICE A DAY  
  
 ondansetron 8 mg disintegrating tablet Commonly known as:  ZOFRAN ODT Take 1 Tab by mouth every eight (8) hours as needed for Nausea. potassium chloride 10 mEq tablet Commonly known as:  KLOR-CON M10  
TAKE 1 TAB BY MOUTH TWO (2) DAYS A WEEK. WED AND FRI  
  
 sertraline 50 mg tablet Commonly known as:  ZOLOFT  
TAKE 1 TABLET BY MOUTH EVERY MORNING  
  
 SYMBICORT 160-4.5 mcg/actuation Hfaa Generic drug:  budesonide-formoterol Take 2 Puffs by inhalation two (2) times daily as needed. telmisartan 20 mg tablet Commonly known as:  Neelima Millszochandra Take 1 Tab by mouth daily. Prescriptions Printed Refills  
 cephALEXin (KEFLEX) 500 mg capsule 0 Sig: Take 1 Cap by mouth four (4) times daily for 10 days. Class: Print Route: Oral  
  
Follow-up Instructions Return if symptoms worsen or fail to improve, for Follow up with podiatry. To-Do List   
 08/14/2018 10:00 AM  
  Appointment with [address] [place] [address] at [facility] ([phone])  
  
 08/28/2018 10:00 AM  
  Appointment with [address] [place] [address] at [facility] ([phone])  
  
 09/04/2018 10:00 AM  
  Appointment with [address] [place] [address] at [facility] ([phone])  
  
 09/17/2018 11:00 AM  
  Appointment with [address] [place] [address] ([phone]) Patient Instructions Paronychia: Care Instructions Your Care Instructions Paronychia (say \"ynyr-dd-VB-ashutosh-uh\") is an infection of the skin around a fingernail or toenail. It happens when germs enter through a break in the skin. The doctor may have made a small cut in the infected area to drain the pus. Most cases of paronychia improve in a few days.  But watch your symptoms and follow your doctor's advice. Though rare, a mild case can turn into something more serious and infect your entire finger or toe. Also, it is possible for an infection to return. Follow-up care is a key part of your treatment and safety. Be sure to make and go to all appointments, and call your doctor if you are having problems. It's also a good idea to know your test results and keep a list of the medicines you take. How can you care for yourself at home? · If your doctor told you how to care for your infected nail, follow the doctor's instructions. If you did not get instructions, follow this general advice: ¨ Wash the area with clean water 2 times a day. Don't use hydrogen peroxide or alcohol, which can slow healing. ¨ You may cover the area with a thin layer of petroleum jelly, such as Vaseline, and a nonstick bandage. ¨ Apply more petroleum jelly and replace the bandage as needed. · If your doctor prescribed antibiotics, take them as directed. Do not stop taking them just because you feel better. You need to take the full course of antibiotics. · Take an over-the-counter pain medicine, such as acetaminophen (Tylenol), ibuprofen (Advil, Motrin), or naproxen (Aleve). Read and follow all instructions on the label. · Do not take two or more pain medicines at the same time unless the doctor told you to. Many pain medicines have acetaminophen, which is Tylenol. Too much acetaminophen (Tylenol) can be harmful. · Prop up the toe or finger so that it is higher than the level of your heart. This will help with pain and swelling. · Apply heat. Put a warm water bottle, heating pad set on low, or warm cloth on your finger or toe. Do not go to sleep with a heating pad on your skin. · Soak the area in warm water twice a day for 15 minutes each time. After soaking, dry the area well and apply a thin layer of petroleum jelly, such as Vaseline. Put on a new bandage. When should you call for help? Call your doctor now or seek immediate medical care if: 
  · You have signs of new or worsening infection, such as: 
¨ Increased pain, swelling, warmth, or redness. ¨ Red streaks leading from the infected skin. ¨ Pus draining from the area. ¨ A fever.  
 Watch closely for changes in your health, and be sure to contact your doctor if: 
  · You do not get better as expected. Where can you learn more? Go to http://shani-jasmin.info/. Enter C435 in the search box to learn more about \"Paronychia: Care Instructions. \" Current as of: October 5, 2017 Content Version: 11.7 © 5873-6270 [employer]. Care instructions adapted under license by [employer] (which disclaims liability or warranty for this information). If you have questions about a medical condition or this instruction, always ask your healthcare professional. [address] any warranty or liability for your use of this information. Introducing [facility]! Dear Fei Simpson: Thank you for requesting a [employer] account. Our records indicate that you already have an active [employer] account. You can access your account anytime at [email or web address] Did you know that you can access your hospital and ER discharge instructions at any time in [employer]? You can also review all of your test results from your hospital stay or ER visit. Additional Information If you have questions, please visit the Frequently Asked Questions section of the [employer] website at [email or web address] Remember, [employer] is NOT to be used for urgent needs. For medical emergencies, dial 911. Now available from your iPhone and Android! Please provide this summary of care documentation to your next provider. Your primary care clinician is listed as [place]. If you have any questions after today's visit, please call [phone].

## 2018-08-13 NOTE — PROGRESS NOTES
Patient is a 63 y.o. female presenting with nail problem. Nail Problem   This is a new problem. The current episode started more than 2 days ago. The problem occurs constantly. The problem has been gradually worsening. The symptoms are aggravated by walking (pain around nail on left second toe-started after pedicure treatment ). She has tried nothing for the symptoms. Past Medical History:   Diagnosis Date    Adverse effect of anesthesia     sleep apnea cpap machine useage     Anemia     Arthritis     DDD low back and knees    Asthma     uses albuterol prn only; none x 6 mos.   Never hospitalized    BRCA negative 2013    Calculus of kidney     Chronic kidney disease     kidney stones    Chronic pain     knee pain bilat    CINV (chemotherapy-induced nausea and vomiting) 2014    Decreased hearing, right     PATIENT STATES THIS IS DUE TO CHEMOTHERAPY    Diabetes ([address] Age 46    IDDM    Environmental allergies     Fibromyalgia     GERD (gastroesophageal reflux disease)     controlled with med    Hx of pulmonary embolus during pregnancy 2015    Hypertension     Ill-defined condition     Sepsis  -  SOURCE PORT    Ill-defined condition 2016    chemotherapy     Mass of chest wall 2018    Morbid obesity ([address]     Murmur, heart     Other and unspecified hyperlipidemia     Ovarian cancer ([address] 2012, 2014    serous, high grade, stage IIIc. s/p chemotx (has port)    Psychiatric disorder     Rectal bleeding     Thromboembolus ([address]     POST PARTUM; resolved- PELVIS    Thyroid disease     HYPOTHYROID    Unspecified sleep apnea     CPAP, Dr. Lul Perez        Past Surgical History:   Procedure Laterality Date    BREAST SURGERY PROCEDURE UNLISTED      Lymph node in left breast 2017    CHEST SURGERY PROCEDURE UNLISTED      Placement of port a cath right chest    HX GYN       X2    HX HYSTERECTOMY  2012    ROULA/BSO, tumor debulking, omentectomy for ovarian cancer    HX ORTHOPAEDIC      ankle-FX, R    HX ORTHOPAEDIC      FX R ARM    HX UROLOGICAL      stent    HX VASCULAR ACCESS  11/4/2015    right chest- port placed         Family History   Problem Relation Age of Onset    Hypertension Mother     Arthritis-osteo Mother     Lung Disease Father      COPD    Hypertension Father     Arthritis-osteo Father     Cancer Father      PROSTATE    Hypertension Brother     Elevated Lipids Brother     Arthritis-osteo Brother     Hypertension Brother     Elevated Lipids Brother     Obesity Brother     Cancer Brother      PROSTATE    Anesth Problems Son      DELAYED AWAKENING    Diabetes Maternal Grandmother     Anesth Problems Paternal Grandmother      PATIENT [address] HEART STOPPED DURING SURGERY        Social History     Social History    Marital status:      Spouse name: N/A    Number of children: N/A    Years of education: N/A     Occupational History    Not on file. Social History Main Topics    Smoking status: Never Smoker    Smokeless tobacco: Never Used    Alcohol use Yes      Comment: 2 DRINKS EVERY 2 MONTHS    Drug use: No    Sexual activity: Yes     Other Topics Concern    Not on file     Social History Narrative    Lives in [facility] alone.  passed away in 5/16 of a heart attack. Has 2 sons. Disability. 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 Enjoys swimming and going to the gym. ALLERGIES: Iodinated contrast- oral and iv dye; Carboplatin; Lipitor [atorvastatin]; Losartan; Olmesartan; Percocet [oxycodone-acetaminophen]; Shellfish containing products; Tape [adhesive]; and Tomato    Review of Systems   All other systems reviewed and are negative.       Vitals:    08/13/18 1552   BP: 143/69   Pulse: 79   Resp: 18   Temp: 97.6 °F (36.4 °C)   SpO2: 97%   Weight: 312 lb (141.5 kg)   Height: 5' 8\" (1.727 m)       Physical Exam   Musculoskeletal:        Left foot: There is tenderness (around nail on 2nd toe with mild erythema and swelling- no drainage. discoloration of nail ). There is normal range of motion and no swelling. Feet:        MDM    Procedures      ICD-10-CM ICD-9-CM    1. Paronychia of second toe of left foot L03.032 681.11        Warm water soaking  Motrin   Follow with podiatry     Medications Ordered Today   Medications    cephALEXin (KEFLEX) 500 mg capsule     Sig: Take 1 Cap by mouth four (4) times daily for 10 days. Dispense:  40 Cap     Refill:  0     No results found for any visits on 08/13/18. The patients condition was discussed with the patient and they understand. The patient is to follow up with primary care doctor. If signs and symptoms become worse the pt is to go to the ER. The patient is to take medications as prescribed.

## 2018-08-14 ENCOUNTER — HOSPITAL ENCOUNTER (OUTPATIENT)
Dept: INFUSION THERAPY | Age: 58
Discharge: HOME | End: 2018-08-14
Payer: MEDICARE

## 2018-08-14 ENCOUNTER — DOCUMENTATION ONLY (OUTPATIENT)
Dept: GYNECOLOGY | Age: 58
End: 2018-08-14

## 2018-08-14 VITALS
SYSTOLIC BLOOD PRESSURE: 148 MMHG | BODY MASS INDEX: 43.4 KG/M2 | HEART RATE: 61 BPM | RESPIRATION RATE: 16 BRPM | WEIGHT: 293 LBS | HEIGHT: 69 IN | TEMPERATURE: 98.5 F | DIASTOLIC BLOOD PRESSURE: 87 MMHG

## 2018-08-14 DIAGNOSIS — C56.9 MALIGNANT NEOPLASM OF OVARY, UNSPECIFIED LATERALITY (HCC): ICD-10-CM

## 2018-08-14 LAB
BASOPHILS # BLD: 0.1 K/UL (ref 0–0.1)
BASOPHILS NFR BLD: 1 % (ref 0–1)
DIFFERENTIAL METHOD BLD: ABNORMAL
EOSINOPHIL # BLD: 0.2 K/UL (ref 0–0.4)
EOSINOPHIL NFR BLD: 3 % (ref 0–7)
ERYTHROCYTE [DISTWIDTH] IN BLOOD BY AUTOMATED COUNT: 14.7 % (ref 11.5–14.5)
HCT VFR BLD AUTO: 28 % (ref 35–47)
HGB BLD-MCNC: 8.6 G/DL (ref 11.5–16)
IMM GRANULOCYTES # BLD: 0.1 K/UL (ref 0–0.04)
IMM GRANULOCYTES NFR BLD AUTO: 1 % (ref 0–0.5)
LYMPHOCYTES # BLD: 1.9 K/UL (ref 0.8–3.5)
LYMPHOCYTES NFR BLD: 30 % (ref 12–49)
MCH RBC QN AUTO: 30.4 PG (ref 26–34)
MCHC RBC AUTO-ENTMCNC: 30.7 G/DL (ref 30–36.5)
MCV RBC AUTO: 98.9 FL (ref 80–99)
MONOCYTES # BLD: 0.6 K/UL (ref 0–1)
MONOCYTES NFR BLD: 9 % (ref 5–13)
NEUTS SEG # BLD: 3.6 K/UL (ref 1.8–8)
NEUTS SEG NFR BLD: 55 % (ref 32–75)
NRBC # BLD: 0 K/UL (ref 0–0.01)
NRBC BLD-RTO: 0 PER 100 WBC
PLATELET # BLD AUTO: 181 K/UL (ref 150–400)
PMV BLD AUTO: 9.5 FL (ref 8.9–12.9)
RBC # BLD AUTO: 2.83 M/UL (ref 3.8–5.2)
WBC # BLD AUTO: 6.4 K/UL (ref 3.6–11)
WBC # BLD AUTO: 6.6 K/UL (ref 3.6–11)

## 2018-08-14 PROCEDURE — 77030012965 HC NDL HUBR BBMI -A

## 2018-08-14 PROCEDURE — 36415 COLL VENOUS BLD VENIPUNCTURE: CPT | Performed by: OBSTETRICS & GYNECOLOGY

## 2018-08-14 PROCEDURE — 74011000250 HC RX REV CODE- 250: Performed by: OBSTETRICS & GYNECOLOGY

## 2018-08-14 PROCEDURE — 85027 COMPLETE CBC AUTOMATED: CPT | Performed by: OBSTETRICS & GYNECOLOGY

## 2018-08-14 PROCEDURE — 85004 AUTOMATED DIFF WBC COUNT: CPT | Performed by: OBSTETRICS & GYNECOLOGY

## 2018-08-14 PROCEDURE — 74011250636 HC RX REV CODE- 250/636: Performed by: OBSTETRICS & GYNECOLOGY

## 2018-08-14 PROCEDURE — 96375 TX/PRO/DX INJ NEW DRUG ADDON: CPT

## 2018-08-14 PROCEDURE — 96413 CHEMO IV INFUSION 1 HR: CPT

## 2018-08-14 RX ORDER — SODIUM CHLORIDE 9 MG/ML
10 INJECTION INTRAMUSCULAR; INTRAVENOUS; SUBCUTANEOUS AS NEEDED
Status: ACTIVE | OUTPATIENT
Start: 2018-08-14 | End: 2018-08-14

## 2018-08-14 RX ORDER — SODIUM CHLORIDE 9 MG/ML
500 INJECTION, SOLUTION INTRAVENOUS CONTINUOUS
Status: DISPENSED | OUTPATIENT
Start: 2018-08-14 | End: 2018-08-15

## 2018-08-14 RX ORDER — DEXAMETHASONE SODIUM PHOSPHATE 4 MG/ML
10 INJECTION, SOLUTION INTRA-ARTICULAR; INTRALESIONAL; INTRAMUSCULAR; INTRAVENOUS; SOFT TISSUE ONCE
Status: COMPLETED | OUTPATIENT
Start: 2018-08-14 | End: 2018-08-14

## 2018-08-14 RX ORDER — SODIUM CHLORIDE 0.9 % (FLUSH) 0.9 %
10 SYRINGE (ML) INJECTION AS NEEDED
Status: ACTIVE | OUTPATIENT
Start: 2018-08-14 | End: 2018-08-14

## 2018-08-14 RX ORDER — HEPARIN 100 UNIT/ML
300-500 SYRINGE INTRAVENOUS AS NEEDED
Status: ACTIVE | OUTPATIENT
Start: 2018-08-14 | End: 2018-08-14

## 2018-08-14 RX ORDER — DIPHENHYDRAMINE HYDROCHLORIDE 50 MG/ML
50 INJECTION, SOLUTION INTRAMUSCULAR; INTRAVENOUS ONCE
Status: COMPLETED | OUTPATIENT
Start: 2018-08-14 | End: 2018-08-14

## 2018-08-14 RX ADMIN — PACLITAXEL 120 MG: 6 INJECTION, SOLUTION INTRAVENOUS at 14:10

## 2018-08-14 RX ADMIN — Medication 10 ML: at 13:20

## 2018-08-14 RX ADMIN — SODIUM CHLORIDE 250 ML: 900 INJECTION, SOLUTION INTRAVENOUS at 13:18

## 2018-08-14 RX ADMIN — SODIUM CHLORIDE 10 ML: 9 INJECTION, SOLUTION INTRAMUSCULAR; INTRAVENOUS; SUBCUTANEOUS at 15:25

## 2018-08-14 RX ADMIN — DIPHENHYDRAMINE HYDROCHLORIDE 50 MG: 50 INJECTION INTRAMUSCULAR; INTRAVENOUS at 13:25

## 2018-08-14 RX ADMIN — DEXAMETHASONE SODIUM PHOSPHATE 10 MG: 4 INJECTION, SOLUTION INTRA-ARTICULAR; INTRALESIONAL; INTRAMUSCULAR; INTRAVENOUS; SOFT TISSUE at 13:20

## 2018-08-14 RX ADMIN — FAMOTIDINE 20 MG: 10 INJECTION, SOLUTION INTRAVENOUS at 13:17

## 2018-08-14 RX ADMIN — SODIUM CHLORIDE 10 ML: 9 INJECTION, SOLUTION INTRAMUSCULAR; INTRAVENOUS; SUBCUTANEOUS at 13:17

## 2018-08-14 RX ADMIN — SODIUM CHLORIDE, PRESERVATIVE FREE 500 UNITS: 5 INJECTION INTRAVENOUS at 15:25

## 2018-08-14 NOTE — PROGRESS NOTES
27 [facility] [address]  P [phone]  F [phone]    Office Visit    Patient ID:  Name: Kalin Orellana  MRN: [id number]  : 1960/58 y.o. Visit date: 2018      Primary Diagnosis:     ICD-10-CM ICD-9-CM    1. Malignant neoplasm of ovary, unspecified laterality (HCC) C56.9 183.0    2. HCV antibody positive R76.8 795.79 HCV AB W/RFLX TO VANE   3. Chest wall mass R22.2 786.6    4. On antineoplastic chemotherapy Z79.899 V58.69    5. Type 2 diabetes mellitus with nephropathy (HCC) E11.21 250.40      583.81    6. Lymphadenopathy, mediastinal R59.0 785.6    7. Stage 3 chronic kidney disease N18.3 585.3        HPI  Kalin Orellana is a 63 y.o.  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 The patient's previous treatment procedures include primary Taxol/carbo with retreatment in  and Doxil/Avastin, topotecan, gemzar and now single agent weekly Taxol initiated 18 following progression with chest wall involvement. Most recent EOD imaging 2018 shows overall decrease in size of numerous thoracic and abd/pelvic mets since April and .      OncTx History:  12: MARAH/BSO/Cytoreductive surgery on 12 noted carcinomatosis/omental caking. 10/2012-2013: 6 cycles Carbo/Taxol  2014-2014: 6 Cycles carbo/Taxol  3/2015-5/13/15: 3 cycles Avastin/Doxil until progression  2015-2015 Weekly Topotecan   2015-3/2016: Weekly Topotecan  2017-2017:  Gemzar D1/D8 Q28 days for 6 cycles  2018: CT core needle bx chest wall mass: Metastatic high-grade serous carcinoma (see comment)   General Categorization   Positive for malignancy. 2018-Present: Taxol  D1,8,15      CT Results (most recent):    Results from [place] encounter on 18   CT ABD PELV W CONT   Narrative INDICATION: Chest pain.     EXAM: Preliminary  images were obtained. Multiple contiguous helically acquired images were obtained through the thorax  following intravenous contrast 100 ccs administration. Post contrast images were  obtained through the abdomen and pelvis. Steroid prep was performed for history  of rash with contrast aspiration. 3D post processing was performed. Coronal and sagittal reconstructions were performed. CT dose reduction was achieved through the use of a standardized protocol  tailored for this examination and automatic exposure control for dose  modulation. FINDINGS: Images through the pulmonary vessels reveal no filling defects or  meniscus to suggest pulmonary embolic process. Images through the mediastinum reveal extensive confluent mediastinal  lymphadenopathy. A mass lesion is present at the sternoclavicular junction  region measuring roughly 4.3 x 3.9 cm in size. Images through the lungs reveal scattered subsegmental atelectasis but no  confluent airspace disease or significant pleural effusion or parenchymal mass  lesion. Right cardiophrenic angle lymphadenopathy. Images through the abdomen demonstrate extensive massive celiac and rocio  hepatis and portacaval lymphadenopathy. Dominant mass in the rocio hepatis  region measures roughly 7.1 x 5.3 cm in size. Scattered hepatic parenchymal  hypodensities are present and are nonspecific. Spleen is within normal limits. Adrenal glands are within normal limits. Bilateral renal function. Tiny  bilateral nonobstructing renal calculi. Retroperitoneal lymphadenopathy at the level of the right kidney measures 4.9 x  3.5 cm. Omental mass in the anterior midabdomen measures roughly 5.6 x 3.0 cm. Images through the abdomen and pelvis reveal numerous peritoneal and  retroperitoneal mass lesions. In the pelvis, left external iliac adenopathy  noted measures 4.8 x 2.9 cm. There is a cul-de-sac pelvic mass measuring roughly  5.0 cm in size.  Right groin lymphadenopathy. Impression IMPRESSION:     Extensive confluent mediastinal and right cardiophrenic angle lymphadenopathy. 4.3 x 3.9 cm mass lesion on the right at the sternoclavicular junction. No evidence of pulmonary embolism. Hepatomegaly. Extensive retroperitoneal, peritoneal, and pelvic mass lesions. Findings most consistent with widespread malignancy. Multiple lesions amenable for CT-guided biopsy if indicated clinically. CT chest 4/2018:  FINDINGS:  LUNGS: There is a rounded patchy area of airspace process in the periphery of  the superior segment of the right lower lobe as well as patchy areas along the  periphery just inferior to it. Similar finding noted in the periphery of the  lingula as well as more centrally in the lingula. These may be areas of acute  airspace process. Gerldine Prost PLEURA: There is a chest wall mass which lies along the right side of the  proximal body of the sternum which has pleural base and extends through the  chest wall as well as eroding the adjacent sternum. On axial imaging this mass  is 4.9 x 4.4 cm in size. This may account for the patient's right chest wall  symptoms. TRACHEA/BRONCHI: Patent      The absence of intravenous contrast reduces the sensitivity for evaluation of  the mediastinum and upper abdominal organs.     MEDIASTINUM/SWATI: There is massive adenopathy in the superior mediastinum as  well as adenopathy which extends into the hilar regions bilaterally and in the  subcarinal region. Adenopathy is also identified in the pericardial lymph nodes  on the right. Largest of these measures 2.1 cm. There is also an enlarged lymph  node along the left posterior margin of the manubrium/body of the sternum which  measures 2.9 cm. .     AORTA:  The aorta has a normal contour with no evidence of aneurysm.     UPPER ABDOMEN: On previous abdominal CT adenopathy was noted in the upper  abdomen.  There are retrocrural lymph nodes as well the largest of which on the  left measures 1.4 x 3.7 cm. Previously this lymph node measured 0.8 x 3.1 cm. There is marked adenopathy in the celiac axis extending into the hepatic hilus. This also extends into the area of the falciform ligament. This is more  extensive than on the previous study. There is also perigastric adenopathy on  the left which is now measures approximately 5.3 x 7.4 cm. Previously this was  5.2 x 4.0 cm. .     BONES: As mentioned previously there is lytic erosion along the right side of  the proximal body of the sternum adjacent to the mass. There is a lucency in the  T4 vertebral body.     IMPRESSION:    1. There is a chest wall mass in the right parasternal region which invades the  sternum and is seen on both the internal internal as well as external sides of  the chest wall. This may account for the patient's symptoms of right-sided chest  pain. 2. There are patchy areas of airspace process bilaterally which may represent  areas of acute process in the to be correlated clinically. 3. Massive adenopathy in the mediastinum noted. 4. Increasing adenopathy in the retrocrural region and upper abdomen. .        Lab Results   Component Value Date/Time    CA-125 59 (H) 07/31/2018 08:35 AM    Cancer Ag (CA) 125 226.1 (H) 05/03/2018 11:15 AM       Cancer Ag (CA) 125   Date Value Ref Range Status   05/03/2018 226.1 (H) 0.0 - 38.1 U/mL Final     Comment:     Roche ECLIA methodology   03/28/2018 271.7 (H) 0.0 - 38.1 U/mL Final     Comment:     Roche ECLIA methodology   08/28/2017 183.8 (H) 0.0 - 38.1 U/mL Final     Comment:     Roche ECLIA methodology   07/28/2017 157.4 (H) 0.0 - 38.1 U/mL Final     Comment:     Roche ECLIA methodology   02/28/2017 259.0 (H) 0.0 - 38.1 U/mL Final     Comment:     Roche ECLIA methodology   01/30/2017 347.1 (H) 0.0 - 38.1 U/mL Final     Comment:     Roche ECLIA methodology       SUBJECTIVE:  She [facility] to [facility] for cycle 4 D 8 of weekly therapy. She is in good spirits. Says she has an appointment with her pulmonologist tomorrow for follow up. Using neb txs at home PRN d/t allergies  Says H/A's are better this week   She was most recently released from hospital in July 2018 with a URI r/o ACS which was negative. Chest wall pain minimal, not requiring analgesia. She is more bothered by arthritic knee pain, uses norco ~1/week. Lately left sided HA she feels may be related to diagnosed cracked left upper molar. She has returned to work, working full time driving/transport for medical patients. Notes leg swelling by end of shift. Using [place] hose at times and Lasix PRN for fluid edema. Denies SOB. Appetite stable, no wt loss. Continues to work out. Remains working out at gym and supported by 2 sons and friends. She lost her  last May. She is in counseling, has antidepression therapy and well controlled. She gambles from time to time and is excited for these trips. Control of her sleep apnea with CPAP and her DM averages 90s at home. Most recent A1C 5.2.        ROS  Constitutional: no weight loss, fever, night sweats  Respiratory: no cough, shortness of breath, or wheezing  Cardiovascular: no chest pain or dyspnea on exertion  Heme: No abnormal bleeding  Gastrointestinal: no abdominal pain, change in bowel habits, or black or bloody stools  Genito-Urinary: no dysuria, trouble voiding, or hematuria  Musculoskeletal: + swelling in ankle - bilateral, mostly only at end of day  Neurological: mild neuropathy  Derm: pigmentation/induration medial left leg. Prior injury from fall. Psych: positive for depression - controlled        PMH:  Past Medical History:   Diagnosis Date    Adverse effect of anesthesia     sleep apnea cpap machine useage     Anemia     Arthritis     DDD low back and knees    Asthma     uses albuterol prn only; none x 6 mos.   Never hospitalized    BRCA negative 1/2013    Calculus of kidney     Chronic kidney disease     kidney stones    Chronic pain knee pain bilat    CINV (chemotherapy-induced nausea and vomiting) 2014    Decreased hearing, right     PATIENT STATES THIS IS DUE TO CHEMOTHERAPY    Diabetes ([address] Age 46    IDDM    Environmental allergies     Fibromyalgia     GERD (gastroesophageal reflux disease)     controlled with med    Hx of pulmonary embolus during pregnancy 2015    Hypertension     Ill-defined condition     Sepsis  -  SOURCE PORT    Ill-defined condition 2016    chemotherapy     Mass of chest wall 2018    Morbid obesity ([address]     Murmur, heart     Other and unspecified hyperlipidemia     Ovarian cancer ([address] 2012, 2014    serous, high grade, stage IIIc. s/p chemotx (has port)    Psychiatric disorder     Rectal bleeding     Thromboembolus ([address]     POST PARTUM; resolved- PELVIS    Thyroid disease     HYPOTHYROID    Unspecified sleep apnea     CPAP, Dr. Chyrl Runner     PSH:  Past Surgical History:   Procedure Laterality Date    BREAST SURGERY PROCEDURE UNLISTED      Lymph node in left breast 2017    CHEST SURGERY PROCEDURE UNLISTED      Placement of port a cath right chest    HX GYN       X2    HX HYSTERECTOMY  2012    ROULA/BSO, tumor debulking, omentectomy for ovarian cancer    HX ORTHOPAEDIC      ankle-FX, R    HX ORTHOPAEDIC      FX R ARM    HX UROLOGICAL      stent    HX VASCULAR ACCESS  2015    right chest- port placed     SOC:  Social History     Social History    Marital status:      Spouse name: N/A    Number of children: N/A    Years of education: N/A     Occupational History    Not on file. Social History Main Topics    Smoking status: Never Smoker    Smokeless tobacco: Never Used    Alcohol use Yes      Comment: 2 DRINKS EVERY 2 MONTHS    Drug use: No    Sexual activity: Yes     Other Topics Concern    Not on file     Social History Narrative    Lives [place].  passed away in  of a heart attack. Has 2 sons. Disability. 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 Enjoys swimming and going to the gym. Family History  Family History   Problem Relation Age of Onset    Hypertension Mother     Arthritis-osteo Mother     Lung Disease Father      COPD    Hypertension Father     Arthritis-osteo Father     Cancer Father      PROSTATE    Hypertension Brother     Elevated Lipids Brother     Arthritis-osteo Brother     Hypertension Brother     Elevated Lipids Brother     Obesity Brother     Cancer Brother      PROSTATE    Anesth Problems Son      DELAYED AWAKENING    Diabetes Maternal Grandmother     Anesth Problems Paternal Grandmother      PATIENT [address], [address] STOPPED DURING SURGERY     Medications: (reviewed)  Current Outpatient Prescriptions on File Prior to Encounter   Medication Sig Dispense Refill    LINZESS 145 mcg cap capsule TAKE 1 CAPSULE BY MOUTH EVERY DAY 30 Cap 0    VICTOZA 3-ERNESTO 0.6 mg/0.1 mL (18 mg/3 mL) pnij INJECT 1.8 MG BY SUBCUTANEOUS ROUTE DAILY (WITH LUNCH). 9 Adjustable Dose Pre-filled Pen Syringe 3    LEVEMIR FLEXTOUCH U-100 INSULN 100 unit/mL (3 mL) inpn INJECT 30 UNITS IN AM AND 50 UNITS AT NIGHT. INCREASE AS DIRECTED TO  UNITS/DAY. 30 Adjustable Dose Pre-filled Pen Syringe 3    cephALEXin (KEFLEX) 500 mg capsule Take 1 Cap by mouth four (4) times daily for 10 days. 40 Cap 0    telmisartan (MICARDIS) 20 mg tablet Take 1 Tab by mouth daily. 30 Tab 3    cromolyn (NASALCROM) 5.2 mg/spray (4 %) nasal spray 1 Jackson by Both Nostrils route four (4) times daily. 13 mL 1    omega-3 acid ethyl esters (LOVAZA) 1 gram capsule TAKE ONE CAPSULE BY MOUTH TWICE A  Cap 2    montelukast (SINGULAIR) 10 mg tablet TAKE 1 TAB BY MOUTH DAILY. 90 Tab 1    Dexlansoprazole (DEXILANT) 60 mg CpDB Take 1 Cap by mouth daily.  90 Cap 3    lidocaine-prilocaine (EMLA) topical cream Apply small amount over port area one hour before chemo treatment and cover with a Band-Aid 30 g 1    HYDROcodone-acetaminophen (NORCO) 5-325 mg per tablet Take 1 Tab by mouth every four (4) hours as needed for Pain. Max Daily Amount: 6 Tabs. 30 Tab 0    levothyroxine (SYNTHROID) 150 mcg tablet TAKE 1 TAB BY MOUTH DAILY (BEFORE BREAKFAST). --DZXD [id number] FOR APPOINTMENT FOR MORE REFILLS 90 Tab 0    Liraglutide (VICTOZA) 0.6 mg/0.1 mL (18 mg/3 mL) pnij 1.8 mg by SubCUTAneous route daily (with lunch). 3 Pen 3    insulin aspart (NOVOLOG FLEXPEN) 100 unit/mL inpn INJECT 20 UNITS BEFORE EACH MEAL + 4 UNITS FOR EVERY 50 MG/DL ABOVE 150 MG/DL.  UNITS PER DAY 30 mL 11    potassium chloride (KLOR-CON M10) 10 mEq tablet TAKE 1 TAB BY MOUTH TWO (2) DAYS A WEEK. WED AND FRI 90 Tab 1    SYMBICORT 160-4.5 mcg/actuation HFAA Take 2 Puffs by inhalation two (2) times daily as needed. 2 Inhaler 3    albuterol (PROVENTIL VENTOLIN) 2.5 mg /3 mL (0.083 %) nebulizer solution USE 1 VAIL VIA NEBULIZER EVERY 4 HOURS AS NEEDED FOR WHEEZING 3 Package 1    ondansetron (ZOFRAN ODT) 8 mg disintegrating tablet Take 1 Tab by mouth every eight (8) hours as needed for Nausea. 30 Tab 3    fluticasone (FLONASE) 50 mcg/actuation nasal spray 2 Sprays by Both Nostrils route daily as needed.  clonazePAM (KLONOPIN) 0.5 mg tablet Take 0.5 mg by mouth nightly as needed.  sertraline (ZOLOFT) 50 mg tablet TAKE 1 TABLET BY MOUTH EVERY MORNING  1    cholecalciferol, VITAMIN D3, (VITAMIN D3) 5,000 unit tab tablet Take 5,000 Units by mouth daily.  furosemide (LASIX) 40 mg tablet Take 40 mg by mouth daily as needed (edema; do not exceed 2-3 doses per week). 3     No current facility-administered medications on file prior to encounter. Allergies: (reviewed)  Allergies   Allergen Reactions    Iodinated Contrast- Oral And Iv Dye Rash     13 hr pre-medication prior to IV contrast    Carboplatin Other (comments)     Patient developed shortness of breath, felt faint, coughing, skin flushed and \"itchy\".  This occurred on the patients 8th treatment.  Lipitor [Atorvastatin] Myalgia    Losartan Other (comments)     headaches    Olmesartan Other (comments)    Percocet [Oxycodone-Acetaminophen] Other (comments)     fever    Shellfish Containing Products Hives    Tape [Adhesive] Itching and Other (comments)     \"op site-- clear,thin tape\"--caused blister     Tomato Hives       OBJECTIVE  Physical Exam  Visit Vitals    /87    Pulse 61    Temp 98.5 °F (36.9 °C)    Resp 16    Ht 5' 8.9\" (1.75 m)    Wt 310 lb (140.6 kg)    LMP 09/07/2011    BMI 45.91 kg/m2       GENERAL BRIAN: Conversant, alert, oriented. NAD   HEENT: Normocephalic. Oropharynx clear. No lseions. Gingiva w/o edema/erythema or drainage around left upper dental margins. Nontender facial palpation. Neck  Supple. Trachea midline, no JVD. No adenopathy   RESPIRATORY: Lung CTA, No rales/rhonchi. Adequate respiratory effort. CARDIOVASC: Regular, 3/6 GABRIELLA persist. No visible, grossly palpable lesion. GASTROINT: soft, obese, non-tender, without masses, no fluid wave. Habitus limits exam.   MUSCULOSKEL: FROM. No focal tenderness. EXTREMITIES: bilat 1+ edema/girth nonpitting   NEURO: Grossly intact, no acute deficit. Oriented. Not depressed. Not agitated. Wt Readings from Last 3 Encounters:   08/14/18 310 lb (140.6 kg)   08/13/18 312 lb (141.5 kg)   08/07/18 312 lb 6.4 oz (141.7 kg)       DATE REVIEW as available:  Lab Results   Component Value Date/Time    WBC 6.6 08/14/2018 10:30 AM    WBC 6.4 08/14/2018 10:30 AM    Hemoglobin (POC) 10.5 (L) 03/20/2017 12:15 PM    HGB 8.6 (L) 08/14/2018 10:30 AM    Hematocrit (POC) 31 (L) 03/20/2017 12:15 PM    HCT 28.0 (L) 08/14/2018 10:30 AM    PLATELET 415 94/05/2144 10:30 AM    MCV 98.9 08/14/2018 10:30 AM     Lab Results   Component Value Date/Time    ABS.  NEUTROPHILS 3.6 08/14/2018 10:30 AM     Lab Results   Component Value Date/Time    Sodium 141 07/31/2018 08:35 AM    Potassium 4.2 07/31/2018 08:35 AM Chloride 109 (H) 07/31/2018 08:35 AM    CO2 26 07/31/2018 08:35 AM    Anion gap 6 07/31/2018 08:35 AM    Glucose 143 (H) 07/31/2018 08:35 AM    BUN 20 07/31/2018 08:35 AM    Creatinine 1.26 (H) 07/31/2018 08:35 AM    BUN/Creatinine ratio 16 07/31/2018 08:35 AM    GFR est AA 53 (L) 07/31/2018 08:35 AM    GFR est non-AA 44 (L) 07/31/2018 08:35 AM    Calcium 8.7 07/31/2018 08:35 AM    Bilirubin, total 0.4 07/31/2018 08:35 AM    AST (SGOT) 27 07/31/2018 08:35 AM    Alk. phosphatase 75 07/31/2018 08:35 AM    Protein, total 8.1 07/31/2018 08:35 AM    Albumin 3.2 (L) 07/31/2018 08:35 AM    Globulin 4.9 (H) 07/31/2018 08:35 AM    A-G Ratio 0.7 (L) 07/31/2018 08:35 AM    ALT (SGPT) 22 07/31/2018 08:35 AM       ECHO 7/17/18  Left ventricle: Systolic function was normal. Ejection fraction was estimated in the range of 55 % to 60 %. There were no regional wall motion  abnormalities. Wall thickness was mildly increased. Left atrium: The atrium was mildly dilated. Mitral valve: There was mild regurgitation. Aortic valve: Leaflets exhibited sclerosis without stenosis. Not well visualized. IMPRESSION AND PLAN:  Shira Pena has a working diagnosis of     ICD-10-CM ICD-9-CM    1. Malignant neoplasm of ovary, unspecified laterality ([facility] C56.9 183.0    2. HCV antibody positive R76.8 795.79 HCV AB W/RFLX TO VANE   3. Chest wall mass R22.2 786.6    4. On antineoplastic chemotherapy Z79.899 V58.69    5. Type 2 diabetes mellitus with nephropathy (HCC) E11.21 250.40      583.81    6. Lymphadenopathy, mediastinal R59.0 785.6    7. Stage 3 chronic kidney disease N18.3 585. 3    Karnofsky Score: 100   ECOG stGstrstastdstest:st st1st Continue salvage therapy Taxol cycle 4. Improved CA titer and most recent CT scan with extensive metastatic disease. Remains essentially asx from disease and in good spirits   HA/cracked tooth - no infection. Break following this cycle for extraction. CKD - stage 2-3.  Continue to monitor renal function - continue hydration today, at home encouraged. Monitor wt/volume status. Will check CMP on C5D1 or sooner if needed. Cardiac: Trivial AR/TR with increase in LV wall thickness. Follow with Dr. Buster Espinal Q3 months or sooner if needed. Continue HTN meds and follow with PCP  Anemia - persistent, improved from hospitalization. Anticipate transfusion need with future chemotherapy. IDDM2/SHABANA/depression - Managing her disease(s) well, remains fully active and independent.     BRCA neg - Will consider 'catch-up\" panel testing       Chikis Hernandez NP  8/14/2018/10:16 AM

## 2018-08-14 NOTE — PROGRESS NOTES
[facility] Note:  1000Pt arrived at [facility] ambulatory and in no distress for C4D15. Assessment stable, no new complaints voiced. Intermittent neuroapathy and some tiredness. Medications received:  Decadron  Benadryl  Pepcid  Taxol    *** Tolerated treatment well, no adverse reaction noted. D/Cd from [facility] ambulatory and in no distress accompanied by son.   Next appt   8/28  1000  Visit Vitals    /74    Pulse 68    Temp 98.3 °F (36.8 °C)    Resp 18    Ht 5' 8.9\" (1.75 m)    Wt 140.6 kg (310 lb)    LMP 09/07/2011    BMI 45.91 kg/m2     Recent Results (from the past 12 hour(s))   CBC W/O DIFF    Collection Time: 08/14/18 10:30 AM   Result Value Ref Range    WBC 6.6 3.6 - 11.0 K/uL    RBC 2.83 (L) 3.80 - 5.20 M/uL    HGB 8.6 (L) 11.5 - 16.0 g/dL    HCT 28.0 (L) 35.0 - 47.0 %    MCV 98.9 80.0 - 99.0 FL    MCH 30.4 26.0 - 34.0 PG    MCHC 30.7 30.0 - 36.5 g/dL    RDW 14.7 (H) 11.5 - 14.5 %    PLATELET 178 184 - 131 K/uL    MPV 9.5 8.9 - 12.9 FL    NRBC 0.0 0  WBC    ABSOLUTE NRBC 0.00 0.00 - 0.01 K/uL

## 2018-08-14 NOTE — PROGRESS NOTES
C5 D1 ,D8, D15 cancelled with Latoya Wilkinson d/t pt having a tooth extracted and per [facility]. Pt will call office to update with date of procedure.

## 2018-08-15 ENCOUNTER — TELEPHONE (OUTPATIENT)
Dept: GYNECOLOGY | Age: 58
End: 2018-08-15

## 2018-08-15 NOTE — TELEPHONE ENCOUNTER
Pt calling to give us her scheduled tooth extraction of 9/11/18, and will keep us updated so we can r/s MD/chemo appts.

## 2018-08-19 RX ORDER — PEN NEEDLE, DIABETIC 31 GX5/16"
NEEDLE, DISPOSABLE MISCELLANEOUS
Qty: 200 PEN NEEDLE | Refills: 11 | Status: SHIPPED | OUTPATIENT
Start: 2018-08-19 | End: 2018-11-27 | Stop reason: CLARIF

## 2018-08-21 DIAGNOSIS — J06.9 UPPER RESPIRATORY TRACT INFECTION, UNSPECIFIED TYPE: ICD-10-CM

## 2018-08-21 RX ORDER — FLUTICASONE PROPIONATE 50 MCG
SPRAY, SUSPENSION (ML) NASAL
Qty: 32 G | Refills: 3 | Status: SHIPPED | OUTPATIENT
Start: 2018-08-21 | End: 2018-11-27

## 2018-08-28 ENCOUNTER — APPOINTMENT (OUTPATIENT)
Dept: INFUSION THERAPY | Age: 58
End: 2018-08-28
Payer: MEDICARE

## 2018-09-04 ENCOUNTER — APPOINTMENT (OUTPATIENT)
Dept: INFUSION THERAPY | Age: 58
End: 2018-09-04

## 2018-09-10 RX ORDER — LINACLOTIDE 145 UG/1
CAPSULE, GELATIN COATED ORAL
Qty: 30 CAP | Refills: 0 | Status: SHIPPED | OUTPATIENT
Start: 2018-09-10 | End: 2018-10-08 | Stop reason: SDUPTHER

## 2018-09-17 ENCOUNTER — APPOINTMENT (OUTPATIENT)
Dept: INFUSION THERAPY | Age: 58
End: 2018-09-17

## 2018-09-26 ENCOUNTER — OFFICE VISIT (OUTPATIENT)
Dept: GYNECOLOGY | Age: 58
End: 2018-09-26

## 2018-09-26 VITALS
HEIGHT: 69 IN | WEIGHT: 293 LBS | HEART RATE: 71 BPM | SYSTOLIC BLOOD PRESSURE: 160 MMHG | DIASTOLIC BLOOD PRESSURE: 98 MMHG | BODY MASS INDEX: 43.4 KG/M2

## 2018-09-26 DIAGNOSIS — R97.1 ELEVATED CA-125: ICD-10-CM

## 2018-09-26 DIAGNOSIS — R22.2 CHEST WALL MASS: ICD-10-CM

## 2018-09-26 DIAGNOSIS — N18.30 STAGE 3 CHRONIC KIDNEY DISEASE (HCC): ICD-10-CM

## 2018-09-26 DIAGNOSIS — C80.0 CARCINOMATOSIS (HCC): ICD-10-CM

## 2018-09-26 DIAGNOSIS — C56.9 MALIGNANT NEOPLASM OF OVARY, UNSPECIFIED LATERALITY (HCC): Primary | ICD-10-CM

## 2018-09-26 NOTE — PROGRESS NOTES
Consult visit, to discuss resuming chemotherapy, pt complains of lower abdominal pain that is a 5/10 on pain scale, she states pain comes and goes    Initial blood pressure reading 140/94, repeat blood pressure reading 160/98

## 2018-09-26 NOTE — PROGRESS NOTES
[facility] [address], [address]  P [phone]  F [phone]    Office Visit    Patient ID:  Name: Dino Díaz  MRN: [id number]  : 1960/58 y.o. Visit date: 2018      Primary Diagnosis:     ICD-10-CM ICD-9-CM    1. Malignant neoplasm of ovary, unspecified laterality (HCC) C56.9 183.0    2. Stage 3 chronic kidney disease N18.3 585.3    3. Chest wall mass R22.2 786.6    4. Elevated CA-125 R97.1 795.82    5. Carcinomatosis ([address] C80.0 199.0        HPI  Dino Díaz is a 63 y.o.  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 The patient's previous treatment procedures include primary Taxol/carbo with retreatment in  and Doxil/Avastin, topotecan, gemzar and now single agent weekly Taxol initiated 18 following progression with chest wall involvement. Most recent EOD imaging 2018 shows overall decrease in size of numerous thoracic and abd/pelvic mets since April and .      OncTx History:  12: MARAH/BSO/Cytoreductive surgery on 12 noted carcinomatosis/omental caking. 10/2012-2013: 6 cycles Carbo/Taxol  2014-2014: 6 Cycles carbo/Taxol  3/2015-5/13/15: 3 cycles Avastin/Doxil until progression  2015-2015 Weekly Topotecan   2015-3/2016: Weekly Topotecan  2017-2017:  Gemzar D1/D8 Q28 days for 6 cycles  2018: CT core needle bx chest wall mass: Metastatic high-grade serous carcinoma (see comment)   General Categorization   Positive for malignancy. 2018-Present: Taxol  D1,8,15   S/P  Cycle #4  2018      CT Results (most recent):    Results from [place] encounter on 18   CT ABD PELV W CONT   Narrative INDICATION: Chest pain. EXAM: Preliminary  images were obtained.     Multiple contiguous helically acquired images were obtained through the thorax  following intravenous contrast 100 ccs administration. Post contrast images were  obtained through the abdomen and pelvis. Steroid prep was performed for history  of rash with contrast aspiration. 3D post processing was performed. Coronal and sagittal reconstructions were performed. CT dose reduction was achieved through the use of a standardized protocol  tailored for this examination and automatic exposure control for dose  modulation. FINDINGS: Images through the pulmonary vessels reveal no filling defects or  meniscus to suggest pulmonary embolic process. Images through the mediastinum reveal extensive confluent mediastinal  lymphadenopathy. A mass lesion is present at the sternoclavicular junction  region measuring roughly 4.3 x 3.9 cm in size. Images through the lungs reveal scattered subsegmental atelectasis but no  confluent airspace disease or significant pleural effusion or parenchymal mass  lesion. Right cardiophrenic angle lymphadenopathy. Images through the abdomen demonstrate extensive massive celiac and rocio  hepatis and portacaval lymphadenopathy. Dominant mass in the rocio hepatis  region measures roughly 7.1 x 5.3 cm in size. Scattered hepatic parenchymal  hypodensities are present and are nonspecific. Spleen is within normal limits. Adrenal glands are within normal limits. Bilateral renal function. Tiny  bilateral nonobstructing renal calculi. Retroperitoneal lymphadenopathy at the level of the right kidney measures 4.9 x  3.5 cm. Omental mass in the anterior midabdomen measures roughly 5.6 x 3.0 cm. Images through the abdomen and pelvis reveal numerous peritoneal and  retroperitoneal mass lesions. In the pelvis, left external iliac adenopathy  noted measures 4.8 x 2.9 cm. There is a cul-de-sac pelvic mass measuring roughly  5.0 cm in size. Right groin lymphadenopathy. Impression IMPRESSION:     Extensive confluent mediastinal and right cardiophrenic angle lymphadenopathy.     4.3 x 3.9 cm mass lesion on the right at the sternoclavicular junction. No evidence of pulmonary embolism. Hepatomegaly. Extensive retroperitoneal, peritoneal, and pelvic mass lesions. Findings most consistent with widespread malignancy. Multiple lesions amenable for CT-guided biopsy if indicated clinically. CT chest 4/2018:  FINDINGS:  LUNGS: There is a rounded patchy area of airspace process in the periphery of  the superior segment of the right lower lobe as well as patchy areas along the  periphery just inferior to it. Similar finding noted in the periphery of the  lingula as well as more centrally in the lingula. These may be areas of acute  airspace process. Candance Huger PLEURA: There is a chest wall mass which lies along the right side of the  proximal body of the sternum which has pleural base and extends through the  chest wall as well as eroding the adjacent sternum. On axial imaging this mass  is 4.9 x 4.4 cm in size. This may account for the patient's right chest wall  symptoms. TRACHEA/BRONCHI: Patent      The absence of intravenous contrast reduces the sensitivity for evaluation of  the mediastinum and upper abdominal organs.     MEDIASTINUM/SWATI: There is massive adenopathy in the superior mediastinum as  well as adenopathy which extends into the hilar regions bilaterally and in the  subcarinal region. Adenopathy is also identified in the pericardial lymph nodes  on the right. Largest of these measures 2.1 cm. There is also an enlarged lymph  node along the left posterior margin of the manubrium/body of the sternum which  measures 2.9 cm. .     AORTA:  The aorta has a normal contour with no evidence of aneurysm.     UPPER ABDOMEN: On previous abdominal CT adenopathy was noted in the upper  abdomen. There are retrocrural lymph nodes as well the largest of which on the  left measures 1.4 x 3.7 cm. Previously this lymph node measured 0.8 x 3.1 cm.   There is marked adenopathy in the celiac axis extending into the hepatic hilus. This also extends into the area of the falciform ligament. This is more  extensive than on the previous study. There is also perigastric adenopathy on  the left which is now measures approximately 5.3 x 7.4 cm. Previously this was  5.2 x 4.0 cm. .     BONES: As mentioned previously there is lytic erosion along the right side of  the proximal body of the sternum adjacent to the mass. There is a lucency in the  T4 vertebral body.     IMPRESSION:    1. There is a chest wall mass in the right parasternal region which invades the  sternum and is seen on both the internal internal as well as external sides of  the chest wall. This may account for the patient's symptoms of right-sided chest  pain. 2. There are patchy areas of airspace process bilaterally which may represent  areas of acute process in the to be correlated clinically. 3. Massive adenopathy in the mediastinum noted. 4. Increasing adenopathy in the retrocrural region and upper abdomen. .    Component      Latest Ref Rng & Units 7/31/2018 7/3/2018 6/5/2018 5/3/2018           8:35 AM 10:35 AM 10:28 AM 11:15 AM   Cancer Ag (CA) 125      0.0 - 38.1 U/mL    226.1 (H)   CA-125      1.5 - 35.0 U/mL 59 (H) 68 (H) 96 (H)        SUBJECTIVE:  She presents for cycle 4 D 8 of weekly therapy. Tolerated well, reports feeling even better   She is in good spirits. thered by arthritic knee pain, uses norco ~1/week. Lately left sided HA she feels may be related to diagnosed cracked left upper molar. She has returned to work, working full time driving/transport for medical patients. Notes leg swelling by end of shift. Using [place] hose at times and Lasix PRN for fluid edema. Denies SOB. Appetite stable, no wt loss. Continues to work out. Remains working out at gym and supported by 2 sons and friends. She lost her  last May. She is in counseling, has antidepression therapy and well controlled.   She gambles from time to time and is excited for these trips. Control of her sleep apnea with CPAP and her DM averages 90s at home. Most recent A1C 5.2.        ROS  Constitutional: no weight loss, fever, night sweats  Respiratory: no cough, shortness of breath, or wheezing  Cardiovascular: no chest pain or dyspnea on exertion. Reports chest wall mass smaller, no associated pain. Heme: No abnormal bleeding  Gastrointestinal: no abdominal pain, change in bowel habits, or black or bloody stools  Genito-Urinary: no dysuria, trouble voiding, or hematuria  Musculoskeletal: + swelling in ankle - bilateral, mostly only at end of day  Neurological: mild neuropathy  Derm: pigmentation/induration medial left leg. Prior injury from fall. Psych: positive for depression - controlled        PMH:  Past Medical History:   Diagnosis Date    Adverse effect of anesthesia     sleep apnea cpap machine useage     Anemia     Arthritis     DDD low back and knees    Asthma     uses albuterol prn only; none x 6 mos.   Never hospitalized    BRCA negative 1/2013    Calculus of kidney     Chronic kidney disease     kidney stones    Chronic pain     knee pain bilat    CINV (chemotherapy-induced nausea and vomiting) 8/21/2014    Decreased hearing, right     PATIENT STATES THIS IS DUE TO CHEMOTHERAPY    Diabetes ([address] Age 46    IDDM    Environmental allergies     Fibromyalgia     GERD (gastroesophageal reflux disease)     controlled with med    Hx of pulmonary embolus during pregnancy 1/18/2015    Hypertension     Ill-defined condition     Sepsis 9-2015 -  SOURCE PORT    Ill-defined condition 2016    chemotherapy     Mass of chest wall 05/2018    Morbid obesity (HCC)     Murmur, heart     Other and unspecified hyperlipidemia     Ovarian cancer ([address] 9/2012, 1/2014    serous, high grade, stage IIIc. s/p chemotx (has port)    Psychiatric disorder     Rectal bleeding     Thromboembolus ([address] 1993    POST PARTUM; resolved- PELVIS    Thyroid disease HYPOTHYROID    Unspecified sleep apnea     CPAP, Dr. Stephanie Foster     PSH:  Past Surgical History:   Procedure Laterality Date    BREAST SURGERY PROCEDURE UNLISTED      Lymph node in left breast 2017    CHEST SURGERY PROCEDURE UNLISTED      Placement of port a cath right chest    HX GYN       X2    HX HYSTERECTOMY  2012    ROULA/BSO, tumor debulking, omentectomy for ovarian cancer    HX ORTHOPAEDIC      ankle-FX, R    HX ORTHOPAEDIC      FX R ARM    HX UROLOGICAL      stent    HX VASCULAR ACCESS  2015    right chest- port placed     SOC:  Social History     Social History    Marital status:      Spouse name: N/A    Number of children: N/A    Years of education: N/A     Occupational History    Not on file. Social History Main Topics    Smoking status: Never Smoker    Smokeless tobacco: Never Used    Alcohol use Yes      Comment: 2 DRINKS EVERY 2 MONTHS    Drug use: No    Sexual activity: Yes     Other Topics Concern    Not on file     Social History Narrative    Lives in [facility] alone.  passed away in  of a heart attack. Has 2 sons. Disability. 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 Enjoys swimming and going to the gym.      Family History  Family History   Problem Relation Age of Onset    Hypertension Mother     Arthritis-osteo Mother     Lung Disease Father      COPD    Hypertension Father     Arthritis-osteo Father     Cancer Father      PROSTATE    Hypertension Brother     Elevated Lipids Brother     Arthritis-osteo Brother     Hypertension Brother     Elevated Lipids Brother     Obesity Brother     Cancer Brother      PROSTATE    Anesth Problems Son      DELAYED AWAKENING    Diabetes Maternal Grandmother     Anesth Problems Paternal Grandmother      PATIENT [address], [address] STOPPED DURING SURGERY     Medications: (reviewed)  Current Outpatient Prescriptions on File Prior to Visit   Medication Sig Dispense Refill    LINZESS 145 mcg cap capsule TAKE 1 CAPSULE BY MOUTH EVERY DAY 30 Cap 0    fluticasone (FLONASE) 50 mcg/actuation nasal spray 2 SPRAYS IN BOTH NOSTRILS DAILY 32 g 3    BD ULTRA-FINE SHORT PEN NEEDLE 31 gauge x 5/16\" ndle USE TO INJECT INSULIN 5 TIMES DAILY 200 Pen Needle 11    VICTOZA 3-ERNESTO 0.6 mg/0.1 mL (18 mg/3 mL) pnij INJECT 1.8 MG BY SUBCUTANEOUS ROUTE DAILY (WITH LUNCH). 9 Adjustable Dose Pre-filled Pen Syringe 3    LEVEMIR FLEXTOUCH U-100 INSULN 100 unit/mL (3 mL) inpn INJECT 30 UNITS IN AM AND 50 UNITS AT NIGHT. INCREASE AS DIRECTED TO  UNITS/DAY. 30 Adjustable Dose Pre-filled Pen Syringe 3    telmisartan (MICARDIS) 20 mg tablet Take 1 Tab by mouth daily. 30 Tab 3    cromolyn (NASALCROM) 5.2 mg/spray (4 %) nasal spray 1 [place] by Both Nostrils route four (4) times daily. 13 mL 1    omega-3 acid ethyl esters (LOVAZA) 1 gram capsule TAKE ONE CAPSULE BY MOUTH TWICE A  Cap 2    montelukast (SINGULAIR) 10 mg tablet TAKE 1 TAB BY MOUTH DAILY. 90 Tab 1    Dexlansoprazole (DEXILANT) 60 mg CpDB Take 1 Cap by mouth daily. 90 Cap 3    lidocaine-prilocaine (EMLA) topical cream Apply small amount over port area one hour before chemo treatment and cover with a Band-Aid 30 g 1    HYDROcodone-acetaminophen (NORCO) 5-325 mg per tablet Take 1 Tab by mouth every four (4) hours as needed for Pain. Max Daily Amount: 6 Tabs. 30 Tab 0    levothyroxine (SYNTHROID) 150 mcg tablet TAKE 1 TAB BY MOUTH DAILY (BEFORE BREAKFAST). --IRTA [phone] FOR APPOINTMENT FOR MORE REFILLS 90 Tab 0    Liraglutide (VICTOZA) 0.6 mg/0.1 mL (18 mg/3 mL) pnij 1.8 mg by SubCUTAneous route daily (with lunch). 3 Pen 3    insulin aspart (NOVOLOG FLEXPEN) 100 unit/mL inpn INJECT 20 UNITS BEFORE EACH MEAL + 4 UNITS FOR EVERY 50 MG/DL ABOVE 150 MG/DL.  UNITS PER DAY 30 mL 11    potassium chloride (KLOR-CON M10) 10 mEq tablet TAKE 1 TAB BY MOUTH TWO (2) DAYS A WEEK.  WED AND FRI 90 Tab 1    SYMBICORT 160-4.5 mcg/actuation HFAA Take 2 Puffs by inhalation two (2) times daily as needed. 2 Inhaler 3    albuterol (PROVENTIL VENTOLIN) 2.5 mg /3 mL (0.083 %) nebulizer solution USE 1 VAIL VIA NEBULIZER EVERY 4 HOURS AS NEEDED FOR WHEEZING 3 Package 1    ondansetron (ZOFRAN ODT) 8 mg disintegrating tablet Take 1 Tab by mouth every eight (8) hours as needed for Nausea. 30 Tab 3    fluticasone (FLONASE) 50 mcg/actuation nasal spray 2 Sprays by Both Nostrils route daily as needed.  clonazePAM (KLONOPIN) 0.5 mg tablet Take 0.5 mg by mouth nightly as needed.  sertraline (ZOLOFT) 50 mg tablet TAKE 1 TABLET BY MOUTH EVERY MORNING  1    cholecalciferol, VITAMIN D3, (VITAMIN D3) 5,000 unit tab tablet Take 5,000 Units by mouth daily.  furosemide (LASIX) 40 mg tablet Take 40 mg by mouth daily as needed (edema; do not exceed 2-3 doses per week). 3     No current facility-administered medications on file prior to visit. Allergies: (reviewed)  Allergies   Allergen Reactions    Iodinated Contrast- Oral And Iv Dye Rash     13 hr pre-medication prior to IV contrast    Carboplatin Other (comments)     Patient developed shortness of breath, felt faint, coughing, skin flushed and \"itchy\". This occurred on the patients 8th treatment.  Lipitor [Atorvastatin] Myalgia    Losartan Other (comments)     headaches    Olmesartan Other (comments)    Percocet [Oxycodone-Acetaminophen] Other (comments)     fever    Shellfish Containing Products Hives    Tape [Adhesive] Itching and Other (comments)     \"op site-- clear,thin tape\"--caused blister     Tomato Hives       OBJECTIVE  Physical Exam  Visit Vitals    LMP 09/07/2011       GENERAL BRIAN: Conversant, alert, oriented. NAD   HEENT: Normocephalic. Oropharynx clear. No lseions. Gingiva w/o edema/erythema or drainage around left upper dental margins. Nontender facial palpation. Neck  Supple. Trachea midline, no JVD. No adenopathy   RESPIRATORY: Lung CTA, No rales/rhonchi.  Adequate respiratory effort. No discernable chest wall mass or tenderness. IV port without induration. CARDIOVASC: Regular, 3/6 GABRIELLA persist. No visible, grossly palpable lesion. GASTROINT: soft, obese, non-tender, without masses, no fluid wave. Habitus limits exam.   MUSCULOSKEL: FROM. No focal tenderness. EXTREMITIES: bilat 1+ edema/girth nonpitting   NEURO: Grossly intact, no acute deficit. Oriented. Not depressed. Not agitated. Wt Readings from Last 3 Encounters:   08/14/18 310 lb (140.6 kg)   08/13/18 312 lb (141.5 kg)   08/07/18 312 lb 6.4 oz (141.7 kg)       DATE REVIEW as available:  Lab Results   Component Value Date/Time    WBC 6.6 08/14/2018 10:30 AM    WBC 6.4 08/14/2018 10:30 AM    Hemoglobin (POC) 10.5 (L) 03/20/2017 12:15 PM    HGB 8.6 (L) 08/14/2018 10:30 AM    Hematocrit (POC) 31 (L) 03/20/2017 12:15 PM    HCT 28.0 (L) 08/14/2018 10:30 AM    PLATELET 133 77/32/2602 10:30 AM    MCV 98.9 08/14/2018 10:30 AM     Lab Results   Component Value Date/Time    ABS. NEUTROPHILS 3.6 08/14/2018 10:30 AM     Lab Results   Component Value Date/Time    Sodium 141 07/31/2018 08:35 AM    Potassium 4.2 07/31/2018 08:35 AM    Chloride 109 (H) 07/31/2018 08:35 AM    CO2 26 07/31/2018 08:35 AM    Anion gap 6 07/31/2018 08:35 AM    Glucose 143 (H) 07/31/2018 08:35 AM    BUN 20 07/31/2018 08:35 AM    Creatinine 1.26 (H) 07/31/2018 08:35 AM    BUN/Creatinine ratio 16 07/31/2018 08:35 AM    GFR est AA 53 (L) 07/31/2018 08:35 AM    GFR est non-AA 44 (L) 07/31/2018 08:35 AM    Calcium 8.7 07/31/2018 08:35 AM    Bilirubin, total 0.4 07/31/2018 08:35 AM    AST (SGOT) 27 07/31/2018 08:35 AM    Alk.  phosphatase 75 07/31/2018 08:35 AM    Protein, total 8.1 07/31/2018 08:35 AM    Albumin 3.2 (L) 07/31/2018 08:35 AM    Globulin 4.9 (H) 07/31/2018 08:35 AM    A-G Ratio 0.7 (L) 07/31/2018 08:35 AM    ALT (SGPT) 22 07/31/2018 08:35 AM       ECHO 7/17/18  Left ventricle: Systolic function was normal. Ejection fraction was estimated in the range of 55 % to 60 %. There were no regional wall motion  abnormalities. Wall thickness was mildly increased. Left atrium: The atrium was mildly dilated. Mitral valve: There was mild regurgitation. Aortic valve: Leaflets exhibited sclerosis without stenosis. Not well visualized. IMPRESSION AND PLAN:  Nereyda Donis has a working diagnosis of     ICD-10-CM ICD-9-CM    1. Malignant neoplasm of ovary, unspecified laterality ([facility] C56.9 183.0    2. Stage 3 chronic kidney disease N18.3 585.3    3. Chest wall mass R22.2 786.6    4. Elevated CA-125 R97.1 795.82    5. Carcinomatosis ([facility] C80.0 199.0    Karnofsky Score: 100   ECOG stGstrstastdstest:st st1st Continue salvage therapy Taxol cycle 4. Improved CA titer and most recent CT scan with extensive metastatic disease. Remains essentially asx from disease and in good spirits   HA/cracked tooth - no infection. Break following this cycle for extraction. CKD - stage 2-3. Continue to monitor renal function - continue hydration today, at home encouraged. Monitor wt/volume status. Will check CMP on C5D1 or sooner if needed. Cardiac: Trivial AR/TR with increase in LV wall thickness. Follow with Dr. Jacqueline Sheffield Q3 months or sooner if needed. Continue HTN meds and follow with PCP  Anemia - persistent, improved from hospitalization. Anticipate transfusion need with future chemotherapy. IDDM2/SHABANA/depression - Managing her disease(s) well, remains fully active and independent.     BRCA neg - Will consider 'catch-up\" panel testing       Brayden Connolly MD  9/26/2018/10:16 AM

## 2018-10-08 RX ORDER — LINACLOTIDE 145 UG/1
CAPSULE, GELATIN COATED ORAL
Qty: 30 CAP | Refills: 0 | Status: SHIPPED | OUTPATIENT
Start: 2018-10-08 | End: 2018-11-09 | Stop reason: SDUPTHER

## 2018-10-10 DIAGNOSIS — E03.9 ACQUIRED HYPOTHYROIDISM: ICD-10-CM

## 2018-10-10 RX ORDER — LEVOTHYROXINE SODIUM 150 UG/1
TABLET ORAL
Qty: 90 TAB | Refills: 0 | Status: SHIPPED | OUTPATIENT
Start: 2018-10-10 | End: 2018-11-27 | Stop reason: CLARIF

## 2018-10-20 ENCOUNTER — OFFICE VISIT (OUTPATIENT)
Dept: URGENT CARE | Age: 58
End: 2018-10-20

## 2018-10-20 VITALS
WEIGHT: 293 LBS | HEART RATE: 76 BPM | TEMPERATURE: 97.8 F | SYSTOLIC BLOOD PRESSURE: 132 MMHG | OXYGEN SATURATION: 97 % | RESPIRATION RATE: 18 BRPM | HEIGHT: 69 IN | DIASTOLIC BLOOD PRESSURE: 74 MMHG | BODY MASS INDEX: 43.4 KG/M2

## 2018-10-20 DIAGNOSIS — J06.9 UPPER RESPIRATORY TRACT INFECTION, UNSPECIFIED TYPE: Primary | ICD-10-CM

## 2018-10-20 RX ORDER — AZITHROMYCIN 250 MG/1
TABLET, FILM COATED ORAL
Qty: 6 TAB | Refills: 0 | Status: SHIPPED | OUTPATIENT
Start: 2018-10-20 | End: 2018-10-25

## 2018-10-20 NOTE — PATIENT INSTRUCTIONS

## 2018-10-21 NOTE — PROGRESS NOTES
Deisy Pak is a 63 y.o. Female with DM presenting to clinic with upper respiratory symptoms x6 days. She states that she has a headache, shortness of breath, cough, diminished voice, fever (100*F), rhinorrhea, and sinus drainage. States she is tolerating PO well. Denies sick contacts. Denies other complaint today. The history is provided by the patient. History limited by: nothing. Cold Symptoms   Associated symptoms include headaches, rhinorrhea and shortness of breath. Pertinent negatives include no chest pain, no chills, no sore throat, no wheezing, no nausea and no vomiting. Past Medical History:   Diagnosis Date    Adverse effect of anesthesia     sleep apnea cpap machine useage     Anemia     Arthritis     DDD low back and knees    Asthma     uses albuterol prn only; none x 6 mos.   Never hospitalized    BRCA negative 1/2013    Calculus of kidney     Chronic kidney disease     kidney stones    Chronic pain     knee pain bilat    CINV (chemotherapy-induced nausea and vomiting) 8/21/2014    Decreased hearing, right     PATIENT STATES THIS IS DUE TO CHEMOTHERAPY    Diabetes ([address] Age 46    IDDM    Environmental allergies     Fibromyalgia     GERD (gastroesophageal reflux disease)     controlled with med    Hx of pulmonary embolus during pregnancy 1/18/2015    Hypertension     Ill-defined condition     Sepsis 9-2015 -  [facility]    Ill-defined condition 2016    chemotherapy     Mass of chest wall 05/2018    Morbid obesity (HCC)     Murmur, heart     Other and unspecified hyperlipidemia     Ovarian cancer ([address] 9/2012, 1/2014    serous, high grade, stage IIIc. s/p chemotx (has port)    Psychiatric disorder     Rectal bleeding     Thromboembolus ([address] 1993    POST PARTUM; resolved- PELVIS    Thyroid disease     HYPOTHYROID    Unspecified sleep apnea     CPAP, Dr. Marx Degree        Past Surgical History:   Procedure Laterality Date    BREAST SURGERY PROCEDURE UNLISTED      Lymph node in left breast 2017    CHEST SURGERY PROCEDURE UNLISTED      Placement of port a cath right chest    HX GYN       X2    HX HYSTERECTOMY  2012    ROULA/BSO, tumor debulking, omentectomy for ovarian cancer    HX ORTHOPAEDIC      ankle-FX, R    HX ORTHOPAEDIC      FX R ARM    HX UROLOGICAL      stent    HX VASCULAR ACCESS  2015    right chest- port placed         Family History   Problem Relation Age of Onset    Hypertension Mother     Arthritis-osteo Mother     Lung Disease Father         COPD    Hypertension Father     Arthritis-osteo Father     Cancer Father         PROSTATE    Hypertension Brother     Elevated Lipids Brother     Arthritis-osteo Brother     Hypertension Brother     Elevated Lipids Brother     Obesity Brother     Cancer Brother         PROSTATE    Anesth Problems Son         DELAYED AWAKENING    Diabetes Maternal Grandmother     Anesth Problems Paternal Grandmother         PATIENT [address] STOPPED DURING SURGERY        Social History     Socioeconomic History    Marital status:      Spouse name: Not on file    Number of children: Not on file    Years of education: Not on file    Highest education level: Not on file   Social Needs    Financial resource strain: Not on file    Food insecurity - worry: Not on file    Food insecurity - inability: Not on file   Siteminis needs - medical: Not on file   Siteminis needs - non-medical: Not on file   Occupational History    Not on file   Tobacco Use    Smoking status: Never Smoker    Smokeless tobacco: Never Used   Substance and Sexual Activity    Alcohol use: Yes     Comment: 2 DRINKS EVERY 2 MONTHS    Drug use: No    Sexual activity: Yes   Other Topics Concern    Not on file   Social History Narrative    Lives in [address] [place] [address] alone.  passed away in  of a heart attack. Has 2 sons. Disability.   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 Enjoys swimming and going to the gym. ALLERGIES: Iodinated contrast- oral and iv dye; Carboplatin; Lipitor [atorvastatin]; Losartan; Olmesartan; Percocet [oxycodone-acetaminophen]; Shellfish containing products; Tape [adhesive]; and Tomato    Review of Systems   Constitutional: Positive for fever. Negative for appetite change and chills. HENT: Positive for congestion and rhinorrhea. Negative for sneezing and sore throat. Eyes: Negative for pain. Respiratory: Positive for cough and shortness of breath. Negative for chest tightness and wheezing. Cardiovascular: Negative for chest pain. Gastrointestinal: Negative for abdominal pain, nausea and vomiting. Genitourinary: Negative for dysuria. Musculoskeletal: Negative for back pain. Neurological: Positive for headaches. Negative for dizziness. Vitals:    10/20/18 1437   BP: 132/74   Pulse: 76   Resp: 18   Temp: 97.8 °F (36.6 °C)   SpO2: 97%   Weight: 317 lb (143.8 kg)   Height: 5' 9\" (1.753 m)       Physical Exam   Constitutional: She is oriented to person, place, and time. She appears well-developed and well-nourished. HENT:   Head: Normocephalic and atraumatic. Right Ear: External ear normal.   Left Ear: External ear normal.   Nose: Nose normal.   Mouth/Throat: Oropharynx is clear and moist. No oropharyngeal exudate. Tonsils 2+BL, uvula midline, no trismus   Eyes: EOM are normal.   Neck: Normal range of motion. Neck supple. Cardiovascular: Normal rate and regular rhythm. Pulmonary/Chest: Effort normal and breath sounds normal. No respiratory distress. Abdominal: Soft. Bowel sounds are normal. She exhibits no distension. Musculoskeletal: Normal range of motion. Lymphadenopathy:     She has no cervical adenopathy. Neurological: She is alert and oriented to person, place, and time. Skin: Skin is warm and dry. Psychiatric: She has a normal mood and affect.  Her behavior is normal. Judgment and thought content normal.   Nursing note and vitals reviewed. MDM     PLAN  Patient presents to clinic with upper respiratory infection that seems to be worsening x6 days. Appears well, afebrile, nontoxic. Exam benign. 1. Rx azithromycin, as patient continues to feel unwell. 2. Continue OTCs for symptom management  3. Follow up with PCP this week if symptoms continue. 4. Go to ED with worsening symptoms, failure to improve, or any new symptoms. DIAGNOSIS:    ICD-10-CM ICD-9-CM    1. Upper respiratory tract infection, unspecified type J06.9 465.9        Medications Ordered Today   Medications    azithromycin (ZITHROMAX) 250 mg tablet     Sig: Take 2 tablets today, then take 1 tablet daily     Dispense:  6 Tab     Refill:  0   .                  Procedures

## 2018-10-29 DIAGNOSIS — Z79.4 TYPE 2 DIABETES MELLITUS WITH HYPERGLYCEMIA, WITH LONG-TERM CURRENT USE OF INSULIN (HCC): ICD-10-CM

## 2018-10-29 DIAGNOSIS — E11.65 TYPE 2 DIABETES MELLITUS WITH HYPERGLYCEMIA, WITH LONG-TERM CURRENT USE OF INSULIN (HCC): ICD-10-CM

## 2018-10-29 RX ORDER — TELMISARTAN 20 MG/1
20 TABLET ORAL DAILY
Qty: 90 TAB | Refills: 3 | Status: SHIPPED | OUTPATIENT
Start: 2018-10-29 | End: 2019-10-21 | Stop reason: SDUPTHER

## 2018-10-29 RX ORDER — INSULIN ASPART 100 [IU]/ML
INJECTION, SOLUTION INTRAVENOUS; SUBCUTANEOUS
Qty: 30 ML | Refills: 11 | Status: SHIPPED | OUTPATIENT
Start: 2018-10-29 | End: 2019-09-11 | Stop reason: SDUPTHER

## 2018-11-09 RX ORDER — LINACLOTIDE 145 UG/1
CAPSULE, GELATIN COATED ORAL
Qty: 30 CAP | Refills: 0 | Status: SHIPPED | OUTPATIENT
Start: 2018-11-09 | End: 2018-11-27

## 2018-11-14 DIAGNOSIS — R60.9 EDEMA, UNSPECIFIED TYPE: ICD-10-CM

## 2018-11-14 RX ORDER — POTASSIUM CHLORIDE 750 MG/1
TABLET, EXTENDED RELEASE ORAL
Qty: 90 TAB | Refills: 1 | Status: SHIPPED | OUTPATIENT
Start: 2018-11-14 | End: 2020-01-13 | Stop reason: ALTCHOICE

## 2018-11-20 ENCOUNTER — TELEPHONE (OUTPATIENT)
Dept: GYNECOLOGY | Age: 58
End: 2018-11-20

## 2018-11-26 ENCOUNTER — HOSPITAL ENCOUNTER (INPATIENT)
Age: 58
LOS: 2 days | Discharge: HOME | DRG: 202 | End: 2018-11-29
Attending: EMERGENCY MEDICINE | Admitting: FAMILY MEDICINE
Payer: MEDICARE

## 2018-11-26 ENCOUNTER — APPOINTMENT (OUTPATIENT)
Dept: CT IMAGING | Age: 58
DRG: 202 | End: 2018-11-26
Attending: EMERGENCY MEDICINE
Payer: MEDICARE

## 2018-11-26 ENCOUNTER — OFFICE VISIT (OUTPATIENT)
Dept: URGENT CARE | Age: 58
End: 2018-11-26

## 2018-11-26 VITALS
BODY MASS INDEX: 43.4 KG/M2 | HEIGHT: 69 IN | OXYGEN SATURATION: 95 % | WEIGHT: 293 LBS | TEMPERATURE: 99.8 F | DIASTOLIC BLOOD PRESSURE: 63 MMHG | RESPIRATION RATE: 18 BRPM | SYSTOLIC BLOOD PRESSURE: 131 MMHG | HEART RATE: 97 BPM

## 2018-11-26 DIAGNOSIS — C79.9 METASTATIC CANCER (HCC): ICD-10-CM

## 2018-11-26 DIAGNOSIS — E87.5 SERUM POTASSIUM ELEVATED: Primary | ICD-10-CM

## 2018-11-26 DIAGNOSIS — R06.02 SHORTNESS OF BREATH: ICD-10-CM

## 2018-11-26 DIAGNOSIS — C56.9 MALIGNANT NEOPLASM OF OVARY, UNSPECIFIED LATERALITY (HCC): ICD-10-CM

## 2018-11-26 DIAGNOSIS — R06.2 WHEEZING: ICD-10-CM

## 2018-11-26 DIAGNOSIS — R53.1 WEAKNESS: ICD-10-CM

## 2018-11-26 DIAGNOSIS — R07.89 CHEST WALL PAIN: ICD-10-CM

## 2018-11-26 DIAGNOSIS — R07.1 CHEST PAIN ON BREATHING: ICD-10-CM

## 2018-11-26 DIAGNOSIS — R50.9 FEVER, UNSPECIFIED FEVER CAUSE: Primary | ICD-10-CM

## 2018-11-26 DIAGNOSIS — N28.9 RENAL INSUFFICIENCY: ICD-10-CM

## 2018-11-26 LAB
ALBUMIN SERPL-MCNC: 3.6 G/DL (ref 3.5–5)
ALBUMIN/GLOB SERPL: 0.7 {RATIO} (ref 1.1–2.2)
ALP SERPL-CCNC: 78 U/L (ref 45–117)
ALT SERPL-CCNC: 30 U/L (ref 12–78)
ANION GAP SERPL CALC-SCNC: 10 MMOL/L (ref 5–15)
AST SERPL-CCNC: 42 U/L (ref 15–37)
ATRIAL RATE: 85 BPM
BASOPHILS # BLD: 0 K/UL (ref 0–0.1)
BASOPHILS NFR BLD: 0 % (ref 0–1)
BILIRUB SERPL-MCNC: 0.6 MG/DL (ref 0.2–1)
BUN SERPL-MCNC: 27 MG/DL (ref 6–20)
BUN/CREAT SERPL: 18 (ref 12–20)
CALCIUM SERPL-MCNC: 9.1 MG/DL (ref 8.5–10.1)
CALCULATED P AXIS, ECG09: 42 DEGREES
CALCULATED R AXIS, ECG10: -14 DEGREES
CALCULATED T AXIS, ECG11: 31 DEGREES
CHLORIDE SERPL-SCNC: 108 MMOL/L (ref 97–108)
CO2 SERPL-SCNC: 20 MMOL/L (ref 21–32)
CREAT SERPL-MCNC: 1.51 MG/DL (ref 0.55–1.02)
DIAGNOSIS, 93000: NORMAL
DIFFERENTIAL METHOD BLD: ABNORMAL
EOSINOPHIL # BLD: 0.2 K/UL (ref 0–0.4)
EOSINOPHIL NFR BLD: 2 % (ref 0–7)
ERYTHROCYTE [DISTWIDTH] IN BLOOD BY AUTOMATED COUNT: 13.9 % (ref 11.5–14.5)
FLUAV+FLUBV AG NOSE QL IA.RAPID: NEGATIVE POS/NEG
FLUAV+FLUBV AG NOSE QL IA.RAPID: NEGATIVE POS/NEG
GLOBULIN SER CALC-MCNC: 5.4 G/DL (ref 2–4)
GLUCOSE SERPL-MCNC: 112 MG/DL (ref 65–100)
HCT VFR BLD AUTO: 28.9 % (ref 35–47)
HGB BLD-MCNC: 8.6 G/DL (ref 11.5–16)
IMM GRANULOCYTES # BLD: 0 K/UL (ref 0–0.04)
IMM GRANULOCYTES NFR BLD AUTO: 1 % (ref 0–0.5)
LACTATE BLD-SCNC: 0.97 MMOL/L (ref 0.4–2)
LYMPHOCYTES # BLD: 1.6 K/UL (ref 0.8–3.5)
LYMPHOCYTES NFR BLD: 19 % (ref 12–49)
MCH RBC QN AUTO: 28.4 PG (ref 26–34)
MCHC RBC AUTO-ENTMCNC: 29.8 G/DL (ref 30–36.5)
MCV RBC AUTO: 95.4 FL (ref 80–99)
MONOCYTES # BLD: 1.2 K/UL (ref 0–1)
MONOCYTES NFR BLD: 14 % (ref 5–13)
NEUTS SEG # BLD: 5.6 K/UL (ref 1.8–8)
NEUTS SEG NFR BLD: 65 % (ref 32–75)
NRBC # BLD: 0 K/UL (ref 0–0.01)
NRBC BLD-RTO: 0 PER 100 WBC
P-R INTERVAL, ECG05: 154 MS
PLATELET # BLD AUTO: 138 K/UL (ref 150–400)
PMV BLD AUTO: 9.3 FL (ref 8.9–12.9)
POTASSIUM SERPL-SCNC: 5.9 MMOL/L (ref 3.5–5.1)
PROT SERPL-MCNC: 9 G/DL (ref 6.4–8.2)
Q-T INTERVAL, ECG07: 326 MS
QRS DURATION, ECG06: 74 MS
QTC CALCULATION (BEZET), ECG08: 387 MS
RBC # BLD AUTO: 3.03 M/UL (ref 3.8–5.2)
SODIUM SERPL-SCNC: 138 MMOL/L (ref 136–145)
VALID INTERNAL CONTROL?: YES
VENTRICULAR RATE, ECG03: 85 BPM
WBC # BLD AUTO: 8.6 K/UL (ref 3.6–11)

## 2018-11-26 PROCEDURE — 94640 AIRWAY INHALATION TREATMENT: CPT

## 2018-11-26 PROCEDURE — 36415 COLL VENOUS BLD VENIPUNCTURE: CPT

## 2018-11-26 PROCEDURE — 85025 COMPLETE CBC W/AUTO DIFF WBC: CPT

## 2018-11-26 PROCEDURE — 74011636637 HC RX REV CODE- 636/637: Performed by: EMERGENCY MEDICINE

## 2018-11-26 PROCEDURE — 74011000250 HC RX REV CODE- 250: Performed by: EMERGENCY MEDICINE

## 2018-11-26 PROCEDURE — 93970 EXTREMITY STUDY: CPT

## 2018-11-26 PROCEDURE — 74011250637 HC RX REV CODE- 250/637: Performed by: EMERGENCY MEDICINE

## 2018-11-26 PROCEDURE — 51701 INSERT BLADDER CATHETER: CPT

## 2018-11-26 PROCEDURE — 83605 ASSAY OF LACTIC ACID: CPT

## 2018-11-26 PROCEDURE — 87040 BLOOD CULTURE FOR BACTERIA: CPT

## 2018-11-26 PROCEDURE — 80053 COMPREHEN METABOLIC PANEL: CPT

## 2018-11-26 PROCEDURE — 99284 EMERGENCY DEPT VISIT MOD MDM: CPT

## 2018-11-26 PROCEDURE — 74011636320 HC RX REV CODE- 636/320: Performed by: RADIOLOGY

## 2018-11-26 PROCEDURE — 71275 CT ANGIOGRAPHY CHEST: CPT

## 2018-11-26 PROCEDURE — 93005 ELECTROCARDIOGRAM TRACING: CPT

## 2018-11-26 PROCEDURE — 74011000258 HC RX REV CODE- 258: Performed by: RADIOLOGY

## 2018-11-26 PROCEDURE — 77030029684 HC NEB SM VOL KT MONA -A

## 2018-11-26 RX ORDER — IPRATROPIUM BROMIDE AND ALBUTEROL SULFATE 2.5; .5 MG/3ML; MG/3ML
3 SOLUTION RESPIRATORY (INHALATION) ONCE
Status: COMPLETED | OUTPATIENT
Start: 2018-11-26 | End: 2018-11-26

## 2018-11-26 RX ORDER — DIPHENHYDRAMINE HCL 50 MG
50 CAPSULE ORAL ONCE
Status: COMPLETED | OUTPATIENT
Start: 2018-11-26 | End: 2018-11-26

## 2018-11-26 RX ORDER — SODIUM CHLORIDE 0.9 % (FLUSH) 0.9 %
10 SYRINGE (ML) INJECTION
Status: COMPLETED | OUTPATIENT
Start: 2018-11-26 | End: 2018-11-26

## 2018-11-26 RX ORDER — PREDNISONE 20 MG/1
40 TABLET ORAL
Status: COMPLETED | OUTPATIENT
Start: 2018-11-26 | End: 2018-11-26

## 2018-11-26 RX ADMIN — IPRATROPIUM BROMIDE AND ALBUTEROL SULFATE 3 ML: .5; 3 SOLUTION RESPIRATORY (INHALATION) at 23:54

## 2018-11-26 RX ADMIN — Medication 10 ML: at 23:30

## 2018-11-26 RX ADMIN — DIPHENHYDRAMINE HYDROCHLORIDE 50 MG: 50 CAPSULE ORAL at 21:40

## 2018-11-26 RX ADMIN — SODIUM CHLORIDE 100 ML: 900 INJECTION, SOLUTION INTRAVENOUS at 23:30

## 2018-11-26 RX ADMIN — PREDNISONE 40 MG: 20 TABLET ORAL at 21:40

## 2018-11-26 RX ADMIN — IPRATROPIUM BROMIDE AND ALBUTEROL SULFATE 3 ML: .5; 3 SOLUTION RESPIRATORY (INHALATION) at 23:53

## 2018-11-26 RX ADMIN — IOPAMIDOL 100 ML: 755 INJECTION, SOLUTION INTRAVENOUS at 23:30

## 2018-11-26 NOTE — PROGRESS NOTES
Cold Symptoms   The history is provided by the patient. This is a new problem. Episode onset: 5 days ago. The problem occurs constantly. The problem has been gradually worsening. The cough is productive of sputum. There has been a fever of 101 - 101.9 F. The fever has been present for 3 - 4 days. Associated symptoms include chest pain, chills, sweats, headaches, rhinorrhea, myalgias, shortness of breath, wheezing and nausea. Pertinent negatives include no ear congestion, no ear pain, no sore throat and no vomiting. She is not a smoker. Her past medical history is significant for asthma. Past medical history comments: CKD, Hx of ARF. Past Medical History:   Diagnosis Date    Adverse effect of anesthesia     sleep apnea cpap machine useage     Anemia     Arthritis     DDD low back and knees    Asthma     uses albuterol prn only; none x 6 mos.   Never hospitalized    BRCA negative 1/2013    Calculus of kidney     Chronic kidney disease     kidney stones    Chronic pain     knee pain bilat    CINV (chemotherapy-induced nausea and vomiting) 8/21/2014    Decreased hearing, right     PATIENT STATES THIS IS DUE TO CHEMOTHERAPY    Diabetes ([address] Age 46    IDDM    Environmental allergies     Fibromyalgia     GERD (gastroesophageal reflux disease)     controlled with med    Hx of pulmonary embolus during pregnancy 1/18/2015    Hypertension     Ill-defined condition     Sepsis 9-2015 -  [facility]    Ill-defined condition 2016    chemotherapy     Mass of chest wall 05/2018    Morbid obesity (HCC)     Murmur, heart     Other and unspecified hyperlipidemia     Ovarian cancer ([address] 9/2012, 1/2014    serous, high grade, stage IIIc. s/p chemotx (has port)    Psychiatric disorder     Rectal bleeding     Thromboembolus ([address] 1993    POST PARTUM; resolved- PELVIS    Thyroid disease     HYPOTHYROID    Unspecified sleep apnea     CPAP, Dr. Marx Degree        Past Surgical History:   Procedure Laterality Date    BREAST SURGERY PROCEDURE UNLISTED      Lymph node in left breast 2017    CHEST SURGERY PROCEDURE UNLISTED      Placement of port a cath right chest    HX GYN       X2    HX HYSTERECTOMY  2012    ROULA/BSO, tumor debulking, omentectomy for ovarian cancer    HX ORTHOPAEDIC      ankle-FX, R    HX ORTHOPAEDIC      FX R ARM    HX UROLOGICAL      stent    HX VASCULAR ACCESS  2015    right chest- port placed         Family History   Problem Relation Age of Onset    Hypertension Mother     Arthritis-osteo Mother     Lung Disease Father         COPD    Hypertension Father     Arthritis-osteo Father     Cancer Father         PROSTATE    Hypertension Brother     Elevated Lipids Brother     Arthritis-osteo Brother     Hypertension Brother     Elevated Lipids Brother     Obesity Brother     Cancer Brother         PROSTATE    Anesth Problems Son         DELAYED AWAKENING    Diabetes Maternal Grandmother     Anesth Problems Paternal Grandmother         [address]IENT [address] STOPPED DURING SURGERY        Social History     Socioeconomic History    Marital status:      Spouse name: Not on file    Number of children: Not on file    Years of education: Not on file    Highest education level: Not on file   Social Needs    Financial resource strain: Not on file    Food insecurity - worry: Not on file    Food insecurity - inability: Not on file   AGELON ? needs - medical: Not on file   AGELON ? needs - non-medical: Not on file   Occupational History    Not on file   Tobacco Use    Smoking status: Never Smoker    Smokeless tobacco: Never Used   Substance and Sexual Activity    Alcohol use: Yes     Comment: 2 DRINKS EVERY 2 MONTHS    Drug use: No    Sexual activity: Yes   Other Topics Concern    Not on file   Social History Narrative    Lives in [address] [place] [address],[address] alone.  passed away in  of a heart attack. Has 2 sons. Disability.   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 Enjoys swimming and going to the gym. ALLERGIES: Iodinated contrast- oral and iv dye; Carboplatin; Lipitor [atorvastatin]; Losartan; Olmesartan; Percocet [oxycodone-acetaminophen]; Shellfish containing products; Tape [adhesive]; and Tomato    Review of Systems   Constitutional: Positive for chills and fatigue. Negative for activity change, appetite change and fever. HENT: Positive for congestion and rhinorrhea. Negative for ear pain, sinus pressure, sinus pain, sore throat and trouble swallowing. Respiratory: Positive for cough, shortness of breath and wheezing. Cardiovascular: Positive for chest pain. Negative for palpitations. Gastrointestinal: Positive for nausea. Negative for vomiting. Musculoskeletal: Positive for myalgias. Neurological: Positive for weakness and headaches. Negative for dizziness. Hematological: Negative for adenopathy. Vitals:    11/26/18 1512   BP: 131/63   Pulse: 97   Resp: 18   Temp: 99.8 °F (37.7 °C)   SpO2: 95%   Weight: 326 lb 14.4 oz (148.3 kg)   Height: 5' 9\" (1.753 m)       Physical Exam   Constitutional: She appears well-developed and well-nourished. No distress. HENT:   Right Ear: Tympanic membrane, external ear and ear canal normal.   Left Ear: Tympanic membrane, external ear and ear canal normal.   Nose: Rhinorrhea present. Right sinus exhibits no maxillary sinus tenderness and no frontal sinus tenderness. Left sinus exhibits no maxillary sinus tenderness and no frontal sinus tenderness. Mouth/Throat: Oropharynx is clear and moist and mucous membranes are normal. No oropharyngeal exudate, posterior oropharyngeal edema, posterior oropharyngeal erythema or tonsillar abscesses. Cardiovascular: Normal rate, regular rhythm and normal heart sounds. Pulmonary/Chest: Effort normal. No respiratory distress. She has no decreased breath sounds. She has wheezes in the right upper field.  She has no rhonchi. She has no rales. She exhibits tenderness. Lymphadenopathy:     She has no cervical adenopathy. Neurological: She is alert. Skin: She is not diaphoretic. Psychiatric: She has a normal mood and affect. Her behavior is normal. Judgment and thought content normal.   Nursing note and vitals reviewed. [facility]    ICD-10-CM ICD-9-CM    1. Fever, unspecified fever cause R50.9 780.60 AMB POC SANDRA INFLUENZA A/B TEST      XR CHEST PA LAT   2. Shortness of breath R06.02 786.05    3. Weakness R53.1 780.79    4. Chest wall pain R07.89 786.52      Referred to ER for further evaluation    XR Results (most recent):  Results from Appointment encounter on 11/26/18   XR CHEST PA LAT    Narrative CLINICAL HISTORY: Bodyaches, fever   INDICATION: Fever    COMPARISON: 7/17/2018    FINDINGS:   PA and lateral views of the chest are obtained. The cardiopericardial silhouette is within normal limits. There is no pleural  effusion, pneumothorax or focal consolidation present. Left subclavian port  catheter tip at atriocaval junction unchanged. Impression IMPRESSION: No acute intrathoracic disease.          Results for orders placed or performed in visit on 11/26/18   AMB POC SANDRA INFLUENZA A/B TEST   Result Value Ref Range    VALID INTERNAL CONTROL POC Yes     Influenza A Ag POC Negative Negative Pos/Neg    Influenza B Ag POC Negative Negative Pos/Neg           Procedures

## 2018-11-27 ENCOUNTER — TELEPHONE (OUTPATIENT)
Dept: GYNECOLOGY | Age: 58
End: 2018-11-27

## 2018-11-27 PROBLEM — J45.901 MODERATE ASTHMA WITH ACUTE EXACERBATION: Status: ACTIVE | Noted: 2018-11-27

## 2018-11-27 PROBLEM — R50.9 FEVER: Status: ACTIVE | Noted: 2018-11-27

## 2018-11-27 PROBLEM — E87.5 ACUTE HYPERKALEMIA: Status: ACTIVE | Noted: 2018-11-27

## 2018-11-27 PROBLEM — R22.2 MASS OF CHEST WALL: Status: ACTIVE | Noted: 2018-11-27

## 2018-11-27 PROBLEM — R07.9 ACUTE CHEST PAIN: Status: ACTIVE | Noted: 2018-11-27

## 2018-11-27 PROBLEM — E86.0 DEHYDRATION: Status: ACTIVE | Noted: 2018-11-27

## 2018-11-27 PROBLEM — D69.6 THROMBOCYTOPENIA (HCC): Status: ACTIVE | Noted: 2018-11-27

## 2018-11-27 LAB
ALBUMIN SERPL-MCNC: 3 G/DL (ref 3.5–5)
ALBUMIN/GLOB SERPL: 0.6 {RATIO} (ref 1.1–2.2)
ALP SERPL-CCNC: 71 U/L (ref 45–117)
ALT SERPL-CCNC: 25 U/L (ref 12–78)
ANION GAP SERPL CALC-SCNC: 9 MMOL/L (ref 5–15)
APPEARANCE UR: ABNORMAL
AST SERPL-CCNC: 33 U/L (ref 15–37)
BACTERIA URNS QL MICRO: NEGATIVE /HPF
BASOPHILS # BLD: 0.1 K/UL (ref 0–0.1)
BASOPHILS NFR BLD: 1 % (ref 0–1)
BILIRUB SERPL-MCNC: 0.4 MG/DL (ref 0.2–1)
BILIRUB UR QL: NEGATIVE
BUN SERPL-MCNC: 26 MG/DL (ref 6–20)
BUN/CREAT SERPL: 18 (ref 12–20)
CALCIUM SERPL-MCNC: 8.5 MG/DL (ref 8.5–10.1)
CANCER AG125 SERPL-ACNC: 187 U/ML (ref 1.5–35)
CHLORIDE SERPL-SCNC: 109 MMOL/L (ref 97–108)
CO2 SERPL-SCNC: 20 MMOL/L (ref 21–32)
COLOR UR: ABNORMAL
CREAT SERPL-MCNC: 1.41 MG/DL (ref 0.55–1.02)
DIFFERENTIAL METHOD BLD: ABNORMAL
EOSINOPHIL # BLD: 0 K/UL (ref 0–0.4)
EOSINOPHIL NFR BLD: 0 % (ref 0–7)
EPITH CASTS URNS QL MICRO: ABNORMAL /LPF
ERYTHROCYTE [DISTWIDTH] IN BLOOD BY AUTOMATED COUNT: 13.8 % (ref 11.5–14.5)
EST. AVERAGE GLUCOSE BLD GHB EST-MCNC: 111 MG/DL
GLOBULIN SER CALC-MCNC: 5.2 G/DL (ref 2–4)
GLUCOSE BLD STRIP.AUTO-MCNC: 112 MG/DL (ref 65–100)
GLUCOSE BLD STRIP.AUTO-MCNC: 145 MG/DL (ref 65–100)
GLUCOSE SERPL-MCNC: 225 MG/DL (ref 65–100)
GLUCOSE UR STRIP.AUTO-MCNC: NEGATIVE MG/DL
HBA1C MFR BLD: 5.5 % (ref 4.2–6.3)
HCT VFR BLD AUTO: 26.4 % (ref 35–47)
HGB BLD-MCNC: 7.9 G/DL (ref 11.5–16)
HGB UR QL STRIP: ABNORMAL
HYALINE CASTS URNS QL MICRO: ABNORMAL /LPF (ref 0–5)
IMM GRANULOCYTES # BLD: 0.1 K/UL (ref 0–0.04)
IMM GRANULOCYTES NFR BLD AUTO: 1 % (ref 0–0.5)
KETONES UR QL STRIP.AUTO: NEGATIVE MG/DL
LEUKOCYTE ESTERASE UR QL STRIP.AUTO: ABNORMAL
LYMPHOCYTES # BLD: 0.7 K/UL (ref 0.8–3.5)
LYMPHOCYTES NFR BLD: 11 % (ref 12–49)
MCH RBC QN AUTO: 28.5 PG (ref 26–34)
MCHC RBC AUTO-ENTMCNC: 29.9 G/DL (ref 30–36.5)
MCV RBC AUTO: 95.3 FL (ref 80–99)
MONOCYTES # BLD: 0.5 K/UL (ref 0–1)
MONOCYTES NFR BLD: 7 % (ref 5–13)
NEUTS SEG # BLD: 5.1 K/UL (ref 1.8–8)
NEUTS SEG NFR BLD: 80 % (ref 32–75)
NITRITE UR QL STRIP.AUTO: NEGATIVE
NRBC # BLD: 0 K/UL (ref 0–0.01)
NRBC BLD-RTO: 0 PER 100 WBC
PH UR STRIP: 5 [PH] (ref 5–8)
PLATELET # BLD AUTO: 133 K/UL (ref 150–400)
PLATELET COMMENTS,PCOM: ABNORMAL
PMV BLD AUTO: 9.7 FL (ref 8.9–12.9)
POTASSIUM SERPL-SCNC: 5.2 MMOL/L (ref 3.5–5.1)
POTASSIUM SERPL-SCNC: 6 MMOL/L (ref 3.5–5.1)
PROT SERPL-MCNC: 8.2 G/DL (ref 6.4–8.2)
PROT UR STRIP-MCNC: 100 MG/DL
RBC # BLD AUTO: 2.77 M/UL (ref 3.8–5.2)
RBC #/AREA URNS HPF: ABNORMAL /HPF (ref 0–5)
RBC MORPH BLD: ABNORMAL
SERVICE CMNT-IMP: ABNORMAL
SERVICE CMNT-IMP: ABNORMAL
SODIUM SERPL-SCNC: 138 MMOL/L (ref 136–145)
SP GR UR REFRACTOMETRY: 1.02 (ref 1–1.03)
T4 FREE SERPL-MCNC: 0.9 NG/DL (ref 0.8–1.5)
TSH SERPL DL<=0.05 MIU/L-ACNC: 0.71 UIU/ML (ref 0.36–3.74)
UROBILINOGEN UR QL STRIP.AUTO: 0.2 EU/DL (ref 0.2–1)
WBC # BLD AUTO: 6.5 K/UL (ref 3.6–11)
WBC URNS QL MICRO: ABNORMAL /HPF (ref 0–4)

## 2018-11-27 PROCEDURE — 84132 ASSAY OF SERUM POTASSIUM: CPT

## 2018-11-27 PROCEDURE — 74011250637 HC RX REV CODE- 250/637: Performed by: INTERNAL MEDICINE

## 2018-11-27 PROCEDURE — 81001 URINALYSIS AUTO W/SCOPE: CPT

## 2018-11-27 PROCEDURE — 74011636637 HC RX REV CODE- 636/637: Performed by: INTERNAL MEDICINE

## 2018-11-27 PROCEDURE — 74011000258 HC RX REV CODE- 258: Performed by: FAMILY MEDICINE

## 2018-11-27 PROCEDURE — 84443 ASSAY THYROID STIM HORMONE: CPT

## 2018-11-27 PROCEDURE — 83036 HEMOGLOBIN GLYCOSYLATED A1C: CPT

## 2018-11-27 PROCEDURE — 74011000250 HC RX REV CODE- 250: Performed by: FAMILY MEDICINE

## 2018-11-27 PROCEDURE — 85025 COMPLETE CBC W/AUTO DIFF WBC: CPT

## 2018-11-27 PROCEDURE — 74011250636 HC RX REV CODE- 250/636: Performed by: FAMILY MEDICINE

## 2018-11-27 PROCEDURE — 74011250637 HC RX REV CODE- 250/637: Performed by: FAMILY MEDICINE

## 2018-11-27 PROCEDURE — 94760 N-INVAS EAR/PLS OXIMETRY 1: CPT

## 2018-11-27 PROCEDURE — 77030011943

## 2018-11-27 PROCEDURE — 74011250636 HC RX REV CODE- 250/636: Performed by: EMERGENCY MEDICINE

## 2018-11-27 PROCEDURE — 74011636637 HC RX REV CODE- 636/637: Performed by: FAMILY MEDICINE

## 2018-11-27 PROCEDURE — 74011000258 HC RX REV CODE- 258: Performed by: EMERGENCY MEDICINE

## 2018-11-27 PROCEDURE — 65210000002 HC RM PRIVATE GYN

## 2018-11-27 PROCEDURE — 82962 GLUCOSE BLOOD TEST: CPT

## 2018-11-27 PROCEDURE — 84439 ASSAY OF FREE THYROXINE: CPT

## 2018-11-27 PROCEDURE — 36415 COLL VENOUS BLD VENIPUNCTURE: CPT

## 2018-11-27 PROCEDURE — 86304 IMMUNOASSAY TUMOR CA 125: CPT

## 2018-11-27 PROCEDURE — 80053 COMPREHEN METABOLIC PANEL: CPT

## 2018-11-27 RX ORDER — SODIUM CHLORIDE 0.9 % (FLUSH) 0.9 %
5-10 SYRINGE (ML) INJECTION AS NEEDED
Status: DISCONTINUED | OUTPATIENT
Start: 2018-11-27 | End: 2018-11-29 | Stop reason: HOSPADM

## 2018-11-27 RX ORDER — DEXTROSE 50 % IN WATER (D50W) INTRAVENOUS SYRINGE
25 ONCE
Status: COMPLETED | OUTPATIENT
Start: 2018-11-27 | End: 2018-11-27

## 2018-11-27 RX ORDER — CLONAZEPAM 0.5 MG/1
0.5 TABLET ORAL
Status: DISCONTINUED | OUTPATIENT
Start: 2018-11-27 | End: 2018-11-29 | Stop reason: HOSPADM

## 2018-11-27 RX ORDER — DEXLANSOPRAZOLE 60 MG/1
60 CAPSULE, DELAYED RELEASE ORAL DAILY
Status: DISCONTINUED | OUTPATIENT
Start: 2018-11-27 | End: 2018-11-27 | Stop reason: CLARIF

## 2018-11-27 RX ORDER — SODIUM CHLORIDE 9 MG/ML
125 INJECTION, SOLUTION INTRAVENOUS CONTINUOUS
Status: DISCONTINUED | OUTPATIENT
Start: 2018-11-27 | End: 2018-11-29

## 2018-11-27 RX ORDER — MONTELUKAST SODIUM 10 MG/1
10 TABLET ORAL
Status: ON HOLD | COMMUNITY
End: 2021-01-01

## 2018-11-27 RX ORDER — VITAMIN E 1000 UNIT
1000 CAPSULE ORAL DAILY
COMMUNITY

## 2018-11-27 RX ORDER — MONTELUKAST SODIUM 10 MG/1
10 TABLET ORAL
Status: DISCONTINUED | OUTPATIENT
Start: 2018-11-27 | End: 2018-11-29 | Stop reason: HOSPADM

## 2018-11-27 RX ORDER — INSULIN GLARGINE 100 [IU]/ML
20 INJECTION, SOLUTION SUBCUTANEOUS
Status: DISCONTINUED | OUTPATIENT
Start: 2018-11-27 | End: 2018-11-29 | Stop reason: HOSPADM

## 2018-11-27 RX ORDER — SERTRALINE HYDROCHLORIDE 50 MG/1
50 TABLET, FILM COATED ORAL DAILY
Status: DISCONTINUED | OUTPATIENT
Start: 2018-11-27 | End: 2018-11-29 | Stop reason: HOSPADM

## 2018-11-27 RX ORDER — SODIUM BICARBONATE 1 MEQ/ML
50 SYRINGE (ML) INTRAVENOUS ONCE
Status: COMPLETED | OUTPATIENT
Start: 2018-11-27 | End: 2018-11-27

## 2018-11-27 RX ORDER — OXYCODONE AND ACETAMINOPHEN 5; 325 MG/1; MG/1
1 TABLET ORAL AS NEEDED
COMMUNITY
End: 2019-02-07 | Stop reason: ALTCHOICE

## 2018-11-27 RX ORDER — MELATONIN
5000 DAILY
Status: DISCONTINUED | OUTPATIENT
Start: 2018-11-27 | End: 2018-11-29 | Stop reason: HOSPADM

## 2018-11-27 RX ORDER — PANTOPRAZOLE SODIUM 40 MG/1
40 TABLET, DELAYED RELEASE ORAL
Status: DISCONTINUED | OUTPATIENT
Start: 2018-11-27 | End: 2018-11-29 | Stop reason: HOSPADM

## 2018-11-27 RX ORDER — FLUTICASONE PROPIONATE 50 MCG
2 SPRAY, SUSPENSION (ML) NASAL DAILY
Status: DISCONTINUED | OUTPATIENT
Start: 2018-11-27 | End: 2018-11-29 | Stop reason: HOSPADM

## 2018-11-27 RX ORDER — ALBUTEROL SULFATE 0.83 MG/ML
2.5 SOLUTION RESPIRATORY (INHALATION)
COMMUNITY

## 2018-11-27 RX ORDER — SODIUM CHLORIDE 0.9 % (FLUSH) 0.9 %
5-10 SYRINGE (ML) INJECTION EVERY 8 HOURS
Status: DISCONTINUED | OUTPATIENT
Start: 2018-11-27 | End: 2018-11-27

## 2018-11-27 RX ORDER — AZELASTINE 1 MG/ML
1 SPRAY, METERED NASAL
COMMUNITY

## 2018-11-27 RX ORDER — SODIUM CHLORIDE 0.9 % (FLUSH) 0.9 %
5-10 SYRINGE (ML) INJECTION EVERY 8 HOURS
Status: DISCONTINUED | OUTPATIENT
Start: 2018-11-27 | End: 2018-11-29 | Stop reason: HOSPADM

## 2018-11-27 RX ORDER — ONDANSETRON 8 MG/1
8 TABLET, ORALLY DISINTEGRATING ORAL AS NEEDED
COMMUNITY
End: 2018-12-05 | Stop reason: ALTCHOICE

## 2018-11-27 RX ORDER — IPRATROPIUM BROMIDE AND ALBUTEROL SULFATE 2.5; .5 MG/3ML; MG/3ML
3 SOLUTION RESPIRATORY (INHALATION)
Status: DISCONTINUED | OUTPATIENT
Start: 2018-11-27 | End: 2018-11-29 | Stop reason: HOSPADM

## 2018-11-27 RX ORDER — LEVOTHYROXINE SODIUM 150 UG/1
150 TABLET ORAL
Status: DISCONTINUED | OUTPATIENT
Start: 2018-11-27 | End: 2018-11-29 | Stop reason: HOSPADM

## 2018-11-27 RX ORDER — FUROSEMIDE 40 MG/1
40 TABLET ORAL EVERY OTHER DAY
Status: DISCONTINUED | OUTPATIENT
Start: 2018-11-27 | End: 2018-11-29 | Stop reason: HOSPADM

## 2018-11-27 RX ADMIN — Medication 10 ML: at 22:01

## 2018-11-27 RX ADMIN — Medication 10 ML: at 19:15

## 2018-11-27 RX ADMIN — LEVOTHYROXINE SODIUM 150 MCG: 150 TABLET ORAL at 09:39

## 2018-11-27 RX ADMIN — PANTOPRAZOLE SODIUM 40 MG: 40 TABLET, DELAYED RELEASE ORAL at 07:01

## 2018-11-27 RX ADMIN — INSULIN GLARGINE 20 UNITS: 100 INJECTION, SOLUTION SUBCUTANEOUS at 13:12

## 2018-11-27 RX ADMIN — MONTELUKAST SODIUM 10 MG: 10 TABLET, FILM COATED ORAL at 22:00

## 2018-11-27 RX ADMIN — CALCIUM GLUCONATE 1 G: 98 INJECTION, SOLUTION INTRAVENOUS at 04:34

## 2018-11-27 RX ADMIN — FUROSEMIDE 40 MG: 40 TABLET ORAL at 13:10

## 2018-11-27 RX ADMIN — VITAMIN D, TAB 1000IU (100/BT) 5000 UNITS: 25 TAB at 13:10

## 2018-11-27 RX ADMIN — SODIUM CHLORIDE 125 ML/HR: 900 INJECTION, SOLUTION INTRAVENOUS at 21:40

## 2018-11-27 RX ADMIN — SODIUM CHLORIDE 1000 ML: 900 INJECTION, SOLUTION INTRAVENOUS at 01:34

## 2018-11-27 RX ADMIN — CEFTRIAXONE 1 G: 1 INJECTION, POWDER, FOR SOLUTION INTRAMUSCULAR; INTRAVENOUS at 01:34

## 2018-11-27 RX ADMIN — SODIUM CHLORIDE 500 ML: 900 INJECTION, SOLUTION INTRAVENOUS at 04:36

## 2018-11-27 RX ADMIN — Medication 10 ML: at 04:35

## 2018-11-27 RX ADMIN — SODIUM BICARBONATE 50 MEQ: 84 INJECTION INTRAVENOUS at 04:35

## 2018-11-27 RX ADMIN — SODIUM CHLORIDE 125 ML/HR: 900 INJECTION, SOLUTION INTRAVENOUS at 04:36

## 2018-11-27 RX ADMIN — DEXTROSE MONOHYDRATE 25 G: 25 INJECTION, SOLUTION INTRAVENOUS at 04:35

## 2018-11-27 RX ADMIN — SERTRALINE HYDROCHLORIDE 50 MG: 50 TABLET ORAL at 09:39

## 2018-11-27 RX ADMIN — HUMAN INSULIN 10 UNITS: 100 INJECTION, SOLUTION SUBCUTANEOUS at 04:35

## 2018-11-27 RX ADMIN — FLUTICASONE PROPIONATE 2 SPRAY: 50 SPRAY, METERED NASAL at 13:10

## 2018-11-27 NOTE — PROGRESS NOTES
Admission Medication Reconciliation: 
 
Information obtained from:  Patient/RxQuery Comments/Recommendations: Updated PTA meds/reviewed patient's allergies. 1) Patient reports that she takes Levemir 20 units at lunch time along with her sliding scale/mealtime Novolog injections. Patient has been taking furosemide every other day, and she states that she would need a dose today. Patient's last dose of medications was yesterday at noon. 2) Medication changes (since last review): Added 
-Azelastine 137mcg nasal spray 1spray en daily 
-Oxycodone/APAP 5-325mg 1tab po prn when receiving chemotherapy -Vitamin C 1000mg 1tab po daily Adjusted 
-Linzess 145mcg 1cap po daily --> daily prn 
-Montelukast 10mg 1tab po daily --> qhs 
-Ondansetron ODT 8mg 1tab po q8h prn nausea --> when receiving chemotherapy 
-Furosemide 40mg 1tab po daily prn --> every other day (dose due today) -Levemir SC 30 units in AM and 50 units HS --> 20 units daily at lunch Removed 
-Cromolyn nasal spray 
-Fluticasone nasal spray 
-Hydrocodone/APAP 5-325mg 
-Levothyroxine (duplicate) -Victoza (duplicate) Allergies:  Iodinated contrast- oral and iv dye; Carboplatin; Lipitor [atorvastatin]; Losartan; Olmesartan; Percocet [oxycodone-acetaminophen]; Shellfish containing products; Tape [adhesive]; and Tomato Significant PMH/Disease States:  
Past Medical History:  
Diagnosis Date  Adverse effect of anesthesia   
 sleep apnea cpap machine useage  Anemia  Arthritis DDD low back and knees  Asthma   
 uses albuterol prn only; none x 6 mos. Never hospitalized  BRCA negative 1/2013  Calculus of kidney  Chronic kidney disease   
 kidney stones  Chronic pain   
 knee pain bilat  CINV (chemotherapy-induced nausea and vomiting) 8/21/2014  Decreased hearing, right PATIENT STATES THIS IS DUE TO CHEMOTHERAPY  Diabetes ([facility] [address] Age 46 IDDM  Environmental allergies  Fibromyalgia  GERD (gastroesophageal reflux disease)   
 controlled with med  Hx of pulmonary embolus during pregnancy 2015  Hypertension  Ill-defined condition Sepsis  -  SOURCE PORT  
 Ill-defined condition 2016  
 chemotherapy  Mass of chest wall 2018  Morbid obesity ([address]  Murmur, heart  Other and unspecified hyperlipidemia  Ovarian cancer ([facility] 2012, 2014  
 serous, high grade, stage IIIc. s/p chemotx (has port)  Psychiatric disorder  Rectal bleeding  Thromboembolus ([address]  POST PARTUM; resolved- PELVIS  Thyroid disease HYPOTHYROID  Unspecified sleep apnea CPAP, Dr. Neil Reeves Chief Complaint for this Admission: Chief Complaint Patient presents with  Referral / Consult  Shortness of Breath Prior to Admission Medications:  
Prior to Admission Medications Prescriptions Last Dose Informant Patient Reported? Taking? Dexlansoprazole (DEXILANT) 60 mg CpDB   No No  
Sig: Take 1 Cap by mouth daily. Liraglutide (VICTOZA) 0.6 mg/0.1 mL (18 mg/3 mL) pnij   No No  
Si.8 mg by SubCUTAneous route daily (with lunch). SYMBICORT 160-4.5 mcg/actuation HFAA   No No  
Sig: Take 2 Puffs by inhalation two (2) times daily as needed. albuterol (PROVENTIL VENTOLIN) 2.5 mg /3 mL (0.083 %) nebulizer solution   No No  
Sig: USE 1 VAIL VIA NEBULIZER EVERY 4 HOURS AS NEEDED FOR WHEEZING  
azelastine (ASTELIN) 137 mcg (0.1 %) nasal spray   Yes Yes Si Spray two (2) times a day. Use in each nostril as directed  
cholecalciferol, VITAMIN D3, (VITAMIN D3) 5,000 unit tab tablet   Yes No  
Sig: Take 5,000 Units by mouth daily. clonazePAM (KLONOPIN) 0.5 mg tablet   Yes No  
Sig: Take 0.5 mg by mouth nightly as needed. furosemide (LASIX) 40 mg tablet   Yes No  
Sig: Take 40 mg by mouth daily as needed (edema; do not exceed 2-3 doses per week).   
insulin aspart U-100 (NOVOLOG FLEXPEN U-100 INSULIN) 100 unit/mL inpn   No No  
 Sig: INJECT 20 UNITS BEFORE EACH MEAL + 4 UNITS FOR EVERY 50 MG/DL ABOVE 150 MG/DL.  UNITS PER DAY  
levothyroxine (SYNTHROID) 150 mcg tablet   No No  
Sig: TAKE 1 TAB BY MOUTH DAILY (BEFORE BREAKFAST). ---6554 FOR APPOINTMENT FOR MORE REFILLS  
lidocaine-prilocaine (EMLA) topical cream   No No  
Sig: Apply small amount over port area one hour before chemo treatment and cover with a Band-Aid  
omega-3 acid ethyl esters (LOVAZA) 1 gram capsule   No No  
Sig: TAKE ONE CAPSULE BY MOUTH TWICE A DAY  
ondansetron (ZOFRAN ODT) 8 mg disintegrating tablet   No No  
Sig: Take 1 Tab by mouth every eight (8) hours as needed for Nausea. potassium chloride (KLOR-CON M10) 10 mEq tablet   No No  
Sig: TAKE 1 TAB BY MOUTH TWO (2) DAYS A WEEK. WED AND FRI  
sertraline (ZOLOFT) 50 mg tablet   Yes No  
Sig: TAKE 1 TABLET BY MOUTH EVERY MORNING  
telmisartan (MICARDIS) 20 mg tablet   No No  
Sig: Take 1 Tab by mouth daily. Facility-Administered Medications: None

## 2018-11-27 NOTE — CONSULTS
[address] [facility], [address] [place], [address] 
P [phone]  F [phone] Office Visit Patient ID: 
Name: Puja Christine MRN: [id number] : 1960/58 y.o. Visit date: 2018 LEONID Christine is a 63 y.o.  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 The patient's previous treatment procedures include primary Taxol/carbo with retreatment in  and Doxil/Avastin, topotecan, gemzar and now single agent weekly Taxol initiated 18 following progression with chest wall involvement. Most recent EOD imaging 2018 shows overall decrease in size of numerous thoracic and abd/pelvic mets since April and . 
   
SUBJECTIVE: 
Patient presented to Urgent Care yesterday with complaint if increasing SOB/ECKERT and CP. She was wheezing and has required nebs while in the ER. She R/O'd for a PE and DVT As stated above, she has known chest wall metastatis and had been on treatment and completed 4 cycles of Taxol when she needed some dental work. Pt had next treatment held and was to re-start a few weeks later. She never made follow up apt's and did not return to our office after Sept.  When questioned today, she said \"I did not like that chemotherapy\". When she arrive to [facility] ER, she had a CTA that showed persistent chest wall lymphadenopathy  (see full report below), but due to her CKD, they did not do an Abd/Pelvic CT. Currently, she remains in good spirits, but tired. She continue with SOB with talking a lot and some audible expiatory wheezes noted. She is more bothered by arthritic knee pain, uses norco ~1/week. She has returned to work, working full time driving/transport for medical patients. Notes leg swelling by end of shift.  Using [place] hose at times and Lasix PRN for fluid edema. Denies SOB. Appetite stable, no wt loss. She lost her  last May. She is in counseling, has antidepression therapy and well controlled. Control of her sleep apnea with CPAP and her DM OncTx History: 
9/21/12: MARAH/BSO/Cytoreductive surgery on 9/21/12 noted carcinomatosis/omental caking. 10/2012-2/2013: 6 cycles Carbo/Taxol 8/2014-11/2014: 6 Cycles carbo/Taxol 3/2015-5/13/15: 3 cycles Avastin/Doxil until progression 7/2015-8/2015 Weekly Topotecan  
12/2015-3/2016: Weekly Topotecan 2/2017-9/2017Celestina Damien D1/D8 Q28 days for 6 cycles 4/2018: CT core needle bx chest wall mass: Metastatic high-grade serous carcinoma (see comment) General Categorization: Positive for malignancy. 5/8/2018-8/14/18: Taxol  D1,8,15 (pt did not complete due to both dental work and side effects of treatment) CT Results (most recent): 
Results from Hospital Encounter encounter on 11/26/18 CTA CHEST W OR W WO CONT Narrative INDICATION:  Chest pain, acute, pulmonary embolism (PE) suspected EXAM:  CTA Chest with contrast for Pulmonary Embolus COMPARISON:  None TECHNIQUE:  Following the uneventful intravenous administration of 100 cc Isovue 541, thin helical axial images were obtained through the chest. 3D image 
postprocessing was performed. CT dose reduction was achieved through use of a 
standardized protocol tailored for this examination and automatic exposure 
control for dose modulation. Patient has a documented contrast allergy, 
although is only premedicated with oral prednisone by the treating emergency 
physician despite discussion regarding area policy of IV steroid in the 
emergency setting. FINDINGS: 
 
THYROID: Multiple nodules.  
MEDIASTINUM/SWATI: Multiple enlarged mediastinal and hilar lymph nodes, including 
a left thoracic inlet/supraclavicular lymph node measuring 3.3 x 2.4 cm, 
 bilateral internal mammary chain lymph nodes largest on the right measuring 5.2 
x 4.4 cm (previously 4.3 x 3.9 cm); para-aortic lymph node at the arch measures 4.0 x 2.6 cm unchanged. HEART/PERICARDIUM: Enlarged. No pericardial effusion. PULMONARY ARTERIES: Cardiorespiratory motion with no large or central pulmonary 
embolus. AORTA:  No aneurysm. LUNGS/PLEURA: Bibasilar atelectasis. No consolidation, edema or effusion. INCIDENTALLY IMAGED UPPER ABDOMEN: Gastrohepatic and rocio hepatis 
lymphadenopathy partly visualized. BONES: Area of sclerosis upper sternum in an area of previous lysis. ADDITIONAL COMMENTS:  N/A Impression IMPRESSION: 
 
1. Cardiorespiratory motion with no large or central pulmonary embolus. 2. Extensive thoracic lymphadenopathy, may have slightly increased in the 
interval. Tree. Sclerotic changes upper sternum in the area of previous lysis 
adjacent to right internal mammary chain/parasternal lymph node/mass. CTA chest 7/17/18: 
FINDINGS: Images through the pulmonary vessels reveal no filling defects or 
meniscus to suggest pulmonary embolic process. 
  
Images through the mediastinum reveal extensive confluent mediastinal 
lymphadenopathy. A mass lesion is present at the sternoclavicular junction 
region measuring roughly 4.3 x 3.9 cm in size. 
  
Images through the lungs reveal scattered subsegmental atelectasis but no 
confluent airspace disease or significant pleural effusion or parenchymal mass 
lesion. 
  
Right cardiophrenic angle lymphadenopathy. 
  
Images through the abdomen demonstrate extensive massive celiac and rocio 
hepatis and portacaval lymphadenopathy. Dominant mass in the rocio hepatis 
region measures roughly 7.1 x 5.3 cm in size. Scattered hepatic parenchymal 
hypodensities are present and are nonspecific. Spleen is within normal limits. Adrenal glands are within normal limits. Bilateral renal function. Tiny 
bilateral nonobstructing renal calculi.   
Retroperitoneal lymphadenopathy at the level of the right kidney measures 4.9 x 
3.5 cm. 
  
Omental mass in the anterior midabdomen measures roughly 5.6 x 3.0 cm. 
  
Images through the abdomen and pelvis reveal numerous peritoneal and 
retroperitoneal mass lesions. In the pelvis, left external iliac adenopathy 
noted measures 4.8 x 2.9 cm. There is a cul-de-sac pelvic mass measuring roughly 5.0 cm in size. Right groin lymphadenopathy. 
  
IMPRESSION:  
  
Extensive confluent mediastinal and right cardiophrenic angle lymphadenopathy. 
  
4.3 x 3.9 cm mass lesion on the right at the sternoclavicular junction.  
  
No evidence of pulmonary embolism. 
  
Hepatomegaly. 
  
Extensive retroperitoneal, peritoneal, and pelvic mass lesions. 
  
Findings most consistent with widespread malignancy. 
  
Multiple lesions amenable for CT-guided biopsy if indicated clinically. CT chest 4/2018: 
FINDINGS: 
LUNGS: There is a rounded patchy area of airspace process in the periphery of 
the superior segment of the right lower lobe as well as patchy areas along the 
periphery just inferior to it. Similar finding noted in the periphery of the 
lingula as well as more centrally in the lingula. These may be areas of acute 
airspace process. Kyaw Colo PLEURA: There is a chest wall mass which lies along the right side of the 
proximal body of the sternum which has pleural base and extends through the 
chest wall as well as eroding the adjacent sternum. On axial imaging this mass 
is 4.9 x 4.4 cm in size. This may account for the patient's right chest wall 
symptoms.  
TRACHEA/BRONCHI: Patent 
   
The absence of intravenous contrast reduces the sensitivity for evaluation of 
the mediastinum and upper abdominal organs. 
  
MEDIASTINUM/SWATI: There is massive adenopathy in the superior mediastinum as 
well as adenopathy which extends into the hilar regions bilaterally and in the 
 subcarinal region. Adenopathy is also identified in the pericardial lymph nodes 
on the right. Largest of these measures 2.1 cm. There is also an enlarged lymph 
node along the left posterior margin of the manubrium/body of the sternum which 
measures 2.9 cm. . 
  
AORTA:  The aorta has a normal contour with no evidence of aneurysm. 
  
UPPER ABDOMEN: On previous abdominal CT adenopathy was noted in the upper 
abdomen. There are retrocrural lymph nodes as well the largest of which on the 
left measures 1.4 x 3.7 cm. Previously this lymph node measured 0.8 x 3.1 cm. There is marked adenopathy in the celiac axis extending into the hepatic hilus. This also extends into the area of the falciform ligament. This is more 
extensive than on the previous study. There is also perigastric adenopathy on 
the left which is now measures approximately 5.3 x 7.4 cm. Previously this was 5.2 x 4.0 cm. . 
  
BONES: As mentioned previously there is lytic erosion along the right side of 
the proximal body of the sternum adjacent to the mass. There is a lucency in the 
T4 vertebral body. 
  
IMPRESSION:   
1. There is a chest wall mass in the right parasternal region which invades the 
sternum and is seen on both the internal internal as well as external sides of 
the chest wall. This may account for the patient's symptoms of right-sided chest 
pain. 2. There are patchy areas of airspace process bilaterally which may represent 
areas of acute process in the to be correlated clinically. 3. Massive adenopathy in the mediastinum noted. 4. Increasing adenopathy in the retrocrural region and upper abdomen. Cole Caruso PMH: 
Past Medical History:  
Diagnosis Date  Adverse effect of anesthesia   
 sleep apnea cpap machine useage  Anemia  Arthritis DDD low back and knees  Asthma   
 uses albuterol prn only; none x 6 mos. Never hospitalized  BRCA negative 1/2013  Calculus of kidney  Chronic kidney disease kidney stones  Chronic pain   
 knee pain bilat  CINV (chemotherapy-induced nausea and vomiting) 2014  Decreased hearing, right PATIENT STATES THIS IS DUE TO CHEMOTHERAPY  Diabetes ([address] Age 46 IDDM  Environmental allergies  Fibromyalgia  GERD (gastroesophageal reflux disease)   
 controlled with med  Hx of pulmonary embolus during pregnancy 2015  Hypertension  Ill-defined condition Sepsis  -  SOURCE PORT  
 Ill-defined condition 2016  
 chemotherapy  Mass of chest wall 2018  Morbid obesity ([address]  Murmur, heart  Other and unspecified hyperlipidemia  Ovarian cancer ([address] 2012, 2014  
 serous, high grade, stage IIIc. s/p chemotx (has port)  Psychiatric disorder  Rectal bleeding  Thromboembolus ([address]  POST PARTUM; resolved- PELVIS  Thyroid disease HYPOTHYROID  Unspecified sleep apnea CPAP, Dr. Tatum June PSH: 
Past Surgical History:  
Procedure Laterality Date  BREAST SURGERY PROCEDURE UNLISTED Lymph node in left breast 2017  CHEST SURGERY PROCEDURE UNLISTED Placement of port a cath right chest  
 HX GYN    
  X2  
 HX HYSTERECTOMY  2012 ROULA/BSO, tumor debulking, omentectomy for ovarian cancer  HX ORTHOPAEDIC    
 ankle-FX, R  
 HX ORTHOPAEDIC    
 FX R ARM  
 HX UROLOGICAL    
 stent  HX VASCULAR ACCESS  2015  
 right chest- port placed SOC: 
Social History Socioeconomic History  Marital status:  Spouse name: Not on file  Number of children: Not on file  Years of education: Not on file  Highest education level: Not on file Social Needs  Financial resource strain: Not on file  Food insecurity - worry: Not on file  Food insecurity - inability: Not on file  Transportation needs - medical: Not on file  Transportation needs - non-medical: Not on file Occupational History  Not on file Tobacco Use  
  Smoking status: Never Smoker  Smokeless tobacco: Never Used Substance and Sexual Activity  Alcohol use: Yes Comment: 2 DRINKS EVERY 2 MONTHS  Drug use: No  
 Sexual activity: Yes Other Topics Concern  Not on file Social History Narrative Lives in [facility] alone.  passed away in 5/16 of a heart attack. Has 2 sons. Disability. 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 Enjoys swimming and going to the gym. Family History Family History Problem Relation Age of Onset  Hypertension Mother [facility] Arthritis-osteo Mother  Lung Disease Father COPD  Hypertension Father  Arthritis-osteo Father  Cancer Father PROSTATE  Hypertension Brother  Elevated Lipids Brother  Arthritis-osteo Brother  Hypertension Brother  Elevated Lipids Brother  Obesity Brother  Cancer Brother PROSTATE  Anesth Problems Son DELAYED AWAKENING  
 Diabetes Maternal Grandmother  Anesth Problems Paternal Grandmother PATIENT STATES GRANDMOTHER'S HEART STOPPED DURING SURGERY Medications: (reviewed) No current facility-administered medications on file prior to encounter. Current Outpatient Medications on File Prior to Encounter Medication Sig Dispense Refill  azelastine (ASTELIN) 137 mcg (0.1 %) nasal spray 1 [place] by Both Nostrils route daily. Use in each nostril as directed  insulin detemir U-100 (LEVEMIR) 100 unit/mL injection 20 Units by SubCUTAneous route daily (with lunch).  linaclotide (LINZESS) 145 mcg cap capsule Take 145 mcg by mouth daily as needed.  montelukast (SINGULAIR) 10 mg tablet Take 10 mg by mouth nightly.  oxyCODONE-acetaminophen (PERCOCET) 5-325 mg per tablet Take 1 Tab by mouth as needed for Pain (When receiving chemotherapy).  ondansetron (ZOFRAN ODT) 8 mg disintegrating tablet Take 8 mg by mouth as needed for Nausea (When receiving chemotherapy).  ascorbic acid, vitamin C, (VITAMIN C) 1,000 mg tablet Take 1,000 mg by mouth daily.  albuterol (PROVENTIL VENTOLIN) 2.5 mg /3 mL (0.083 %) nebulizer solution 2.5 mg by Nebulization route every four (4) hours as needed for Wheezing.  potassium chloride (KLOR-CON M10) 10 mEq tablet TAKE 1 TAB BY MOUTH TWO (2) DAYS A WEEK. WED AND FRI 90 Tab 1  
 telmisartan (MICARDIS) 20 mg tablet Take 1 Tab by mouth daily. 90 Tab 3  
 insulin aspart U-100 (NOVOLOG FLEXPEN U-100 INSULIN) 100 unit/mL inpn INJECT 20 UNITS BEFORE EACH MEAL + 4 UNITS FOR EVERY 50 MG/DL ABOVE 150 MG/DL.  UNITS PER DAY 30 mL 11  
 omega-3 acid ethyl esters (LOVAZA) 1 gram capsule TAKE ONE CAPSULE BY MOUTH TWICE A  Cap 2  
 Dexlansoprazole (DEXILANT) 60 mg CpDB Take 1 Cap by mouth daily. 90 Cap 3  
 lidocaine-prilocaine (EMLA) topical cream Apply small amount over port area one hour before chemo treatment and cover with a Band-Aid 30 g 1  
 levothyroxine (SYNTHROID) 150 mcg tablet TAKE 1 TAB BY MOUTH DAILY (BEFORE BREAKFAST). --CALL [phone] FOR APPOINTMENT FOR MORE REFILLS 90 Tab 0  Liraglutide (VICTOZA) 0.6 mg/0.1 mL (18 mg/3 mL) pnij 1.8 mg by SubCUTAneous route daily (with lunch). 3 Pen 3  
 SYMBICORT 160-4.5 mcg/actuation HFAA Take 2 Puffs by inhalation two (2) times daily as needed. 2 Inhaler 3  clonazePAM (KLONOPIN) 0.5 mg tablet Take 0.5 mg by mouth nightly as needed.  sertraline (ZOLOFT) 50 mg tablet TAKE 1 TABLET BY MOUTH EVERY MORNING  1  
 cholecalciferol, VITAMIN D3, (VITAMIN D3) 5,000 unit tab tablet Take 5,000 Units by mouth daily.  furosemide (LASIX) 40 mg tablet Take 40 mg by mouth every other day. 3 Allergies: (reviewed) Allergies Allergen Reactions  Carboplatin Other (comments) Patient developed shortness of breath, felt faint, coughing, skin flushed and \"itchy\". This occurred on the patients 8th treatment.  Iodinated Contrast- Oral And Iv Dye Rash 13 hr pre-medication prior to IV contrast.  
Patient has done well with 1 hr pre-medication of (Solu-Medrol) 40mg &  (Benadryl) 50mg.  Lipitor [Atorvastatin] Myalgia  Shellfish Containing Products Hives  Tape [Adhesive] Itching and Other (comments) \"op site-- clear,thin tape\"--caused blister  Tomato Hives  Olmesartan Other (comments)  Losartan Other (comments)  
  headaches  Percocet [Oxycodone-Acetaminophen] Other (comments) Fever; however, current home med  
 
 
_________________________________________________________________________________________________________________________________________________________ 
ROS Constitutional: no weight loss (has gained some since last visit). Denies fever or night sweats Respiratory:Said she was recently given a Z-Pack for a URI, but had completed this and found the SOB,cough and wheezing seemed to return only a few days after stopping Cardiovascular: CP as above and in ED physicians note, + ECKERT. Denies claudication Heme: No abnormal bleeding Gastrointestinal: no abdminal pain, change in bowel habits, or black or bloody stools Genito-Urinary: no dysuria, trouble voiding, or hematuria Musculoskeletal: Chronic, unchanged  swelling in ankle - bilateral, mostly only at end of day. Chronic arthritic knee pain Neurological: mild neuropathy from previous chemotherapy Derm: pigmentation/induration medial left leg. Prior injury from fall. Psych: positive for depression - controlled on current medication OBJECTIVE Physical Exam 
Visit Vitals /71 (BP 1 Location: Left arm, BP Patient Position: At rest;Supine; Head of bed elevated (Comment degrees)) Pulse 71 Temp 98.2 °F (36.8 °C) Resp 20 LMP 09/07/2011 SpO2 97% GENERAL BRIAN: Conversant, alert, oriented. NAD HEENT:  Some nasal discharge noted bilat. Oral mucosa pink, moist without lesions. Teeth in good repair without gum irritation or redness. Neck  Supple. Trachea midline, no JVD. No cervical adenopathy appreciated. Port to upper chest without redness, tenderness or swelling Chest wall Area to the right of her upper sternum noted with a firm nodule;  non-tender to palpation. Approx 1 1/2-2\" diameter RESPIRATORY: Exp wheezing noted to right lower and left lower and mid lungs CARDIOVASC: Regular, 3/6 GABRIELLA persist.   
GASTROINT: soft, obese, non-tender, without masses, no fluid wave. Habitus limits exam.  
MUSCULOSKEL: FROM. No focal tenderness. EXTREMITIES: bilat 1+ edema/girth nonpitting (unchanged). Unable to properly evaluate groin nodes due to body habitus NEURO: Grossly intact, no acute deficit. Oriented. Not depressed. Not agitated. Wt Readings from Last 3 Encounters:  
11/26/18 326 lb 14.4 oz (148.3 kg) 10/20/18 317 lb (143.8 kg) 09/26/18 316 lb 3.2 oz (143.4 kg)  
 
_________________________________________________________________________________________________________________________________________________________ DATE REVIEW as available: 
Lab Results Component Value Date/Time WBC 6.5 11/27/2018 05:19 AM  
 Hemoglobin (POC) 10.5 (L) 03/20/2017 12:15 PM  
 HGB 7.9 (L) 11/27/2018 05:19 AM  
 Hematocrit (POC) 31 (L) 03/20/2017 12:15 PM  
 HCT 26.4 (L) 11/27/2018 05:19 AM  
 PLATELET 235 (L) 45/13/8241 05:19 AM  
 MCV 95.3 11/27/2018 05:19 AM  
 
Lab Results Component Value Date/Time  
 ABS. NEUTROPHILS 5.1 11/27/2018 05:19 AM  
 
Lab Results Component Value Date/Time  Sodium 138 11/27/2018 05:19 AM  
 Potassium 5.2 (H) 11/27/2018 05:19 AM  
 Chloride 109 (H) 11/27/2018 05:19 AM  
 CO2 20 (L) 11/27/2018 05:19 AM  
 Anion gap 9 11/27/2018 05:19 AM  
 Glucose 225 (H) 11/27/2018 05:19 AM  
 BUN 26 (H) 11/27/2018 05:19 AM  
 Creatinine 1.41 (H) 11/27/2018 05:19 AM  
 BUN/Creatinine ratio 18 11/27/2018 05:19 AM  
 GFR est AA 47 (L) 11/27/2018 05:19 AM  
 GFR est non-AA 38 (L) 11/27/2018 05:19 AM  
 Calcium 8.5 11/27/2018 05:19 AM  
 Bilirubin, total 0.4 11/27/2018 05:19 AM  
 AST (SGOT) 33 11/27/2018 05:19 AM  
 Alk. phosphatase 71 11/27/2018 05:19 AM  
 Protein, total 8.2 11/27/2018 05:19 AM  
 Albumin 3.0 (L) 11/27/2018 05:19 AM  
 Globulin 5.2 (H) 11/27/2018 05:19 AM  
 A-G Ratio 0.6 (L) 11/27/2018 05:19 AM  
 ALT (SGPT) 25 11/27/2018 05:19 AM  
 
CA-125 11/27/18=187 (last CA-125 7/31/18= 59) ECHO 7/17/18 Left ventricle: Systolic function was normal. Ejection fraction was estimated in the range of 55 % to 60 %. There were no regional wall motion 
abnormalities. Wall thickness was mildly increased. Left atrium: The atrium was mildly dilated. Mitral valve: There was mild regurgitation. Aortic valve: Leaflets exhibited sclerosis without stenosis. Not well visualized. IMPRESSION: 
Patient Active Problem List  
Diagnosis Code  Carcinomatosis ([address] C80.0  Controlled type 2 diabetes mellitus without complication, with long-term current use of insulin (HCC) E11.9, Z79.4  Morbid obesity (HCC) E66.01  
 Abnormal mammogram R92.8  SHABANA on CPAP G47.33, Z99.89  Dyslipidemia (high LDL; low HDL) E78.5  Benign essential hypertension I10  
 CINV (chemotherapy-induced nausea and vomiting) R11.2, T45.1X5A  Portacath in place--left O44.515  Chest pain, atypical R07.89  
 Ovarian cancer ([address] C56.9  Hyperglycemia due to type 2 diabetes mellitus ([address] E11.65  Renal calculi N20.0  Pulmonary hypertension, mild (HCC) I27.20  Lymphadenopathy, mediastinal R59.0  Type 2 diabetes mellitus with nephropathy (HCC) E11.21  
 Chest mass R22.2  Chest wall mass R22.2  On antineoplastic chemotherapy Z79.899  Reactive depression F32.9  Contrast media allergy Z91.041  
 Diabetes (HCC) E11.9  
 SOB (shortness of breath) R06.02  
 Hypothyroidism E03.9  Anemia D64.9  Hypertension I10  
 Pain due to malignant neoplasm metastatic to bone (HCC) G89.3, C79.51  
  Cracked tooth K03.81  
 Dehydration E86.0  Acute hyperkalemia E87.5  Acute chest pain R07.9  Fever R50.9  Mass of chest wall R22.2  Thrombocytopenia ([address] D69.6  Moderate asthma with acute exacerbation J45.901 PLAN: 
After pt mentioned that she did not come back because she did not like the chemotherapy side effects, we discussed at length other options and that the possibility of a PARB I, may be possible. Her other current medical problems will be addressed by The Hospitalist and then we could meet with her and her sons either while she is in the hospital or in our office after discharge and discuss further options for treatment, if she even wanted any. CKD management per Hospitalist. When renal function improved, would like to obtain an abd/pelvic CT IDDM2/SHABANA/depression - Managing her disease(s) well, remains fully active and independent. Cardiac: Trivial AR/TR with increase in LV wall thickness on last ECHO. Follows with Dr. Erwin Celaya Q3 months or sooner if needed. Continue HTN meds and follow with PCP Remains essentially asx from disease and in good spirits HEME: Anemia/thrombocytopenia - persistent, improved from previous labs. Will continue to monitor BRCA neg -Could consider 'catch-up\" panel testing We will follow along with Hospitalist while in house. Bill Norman NP 
11/27/2018/10:16 AM 
 
Attending Attestation I have seen and examined the above patient and agree with the care provider's assessment and plan. Currently the patient's medical comorbidities and acute exacerbation of her asthma and renal disease take precedence over her cancer. On her CT chest she does have evidence of slight progression; and we will eventually want to follow-up with a CT A/P after she recovers from her acute renal injury.  Regarding further discussions of treatment for her cancer, we will continue this as an outpatient with a family meeting as the patient reports that she did not follow-up previously with our clinic given the side effects she experienced from her last regimen of chemotherapy. I did not have this conversation with the patient in the ER as this type of conversation is best when all invested parties (i.e. Patient and her family) can be present. Will defer management of acute issues to the primary Hospitalist team. Please make sure the patient follows up with our clinic in about 1 week after discharge. Please do not hesitate to contact our team with any further questions or concerns. Thank you for allowing us to participate in this patient's care.   
 
Cass Webster MD

## 2018-11-27 NOTE — PROGRESS NOTES
TRANSFER - OUT REPORT: 
 
Verbal report given to NIKIA Domingo  on Sonny Solis  Being transferred to   for routine progression of care Report consisted of patients Situation, Background, Assessment and  
Recommendations(SBAR). Information from the following report(s) SBAR, Kardex, [facility] and Recent Results was reviewed with the receiving nurse. Lines:  
Venous Access Device VAD Upper chest (subclavicular area), left (Active) Peripheral IV 11/26/18 Right Antecubital (Active) Site Assessment Clean, dry, & intact 11/27/2018  8:00 AM  
Phlebitis Assessment 0 11/27/2018  8:00 AM  
Infiltration Assessment 0 11/27/2018  8:00 AM  
Dressing Status Clean, dry, & intact 11/27/2018  8:00 AM  
Dressing Type Transparent 11/27/2018  8:00 AM  
Hub Color/Line Status Pink; Infusing 11/27/2018  8:00 AM  
Action Taken Open ports on tubing capped 11/27/2018  8:00 AM  
Alcohol Cap Used Yes 11/27/2018  8:00 AM  
  
 
Opportunity for questions and clarification was provided. Patient transported with: 
 [employer]

## 2018-11-27 NOTE — PHYSICIAN ADVISORY
Letter of Status Determination: Current Status INPATIENT is Appropriate Pt Name:  Veronica Valladares MR#  [id number] [facility]#   [id number] Room and Hospital  ER15/15  @ [facility]  
Hospitalization date  11/26/2018  7:23 PM  
Current Attending Physician  Janay WISE DO  
Principal diagnosis  Acute chest pain Clinicals A 52-year-old -American female with past medical history of ovarian carcinoma with metastatic disease, anemia, arthritis, degenerative disk disease, asthma, chronic kidney disease, kidney stone, chronic pain, type 2 diabetes mellitus, fibromyalgia, GERD, hypertension, chest wall mass, morbid obesity, heart murmur, hyperlipidemia, hypothyroidism, sleep apnea, presented to the emergency department from home with chief complaints of chest pain. The patient notably has a chest wall mass and has chronic pain symptoms associated with the same. However, she notes her pain has worsened,  mostly located in the right side of the chest, rated an 8/10, dull aching, nonradiating, without specific other exacerbating or alleviating factors. She complains of shortness of breath and cough. There are other reports of wheezing, fever, diaphoresis. She reportedly had taken Tylenol without relief of symptoms. She went to urgent care center yesterday for evaluation and subsequently referred to the ED. On arrival to the emergency department, initial recorded vital signs of blood pressure 134/73, heart rate 91, respiratory rate of 22, O2 saturation 96% on room air. Workup included chest x-ray, PA and lateral, showing no acute cardiopulmonary process. Twelve-lead EKG showed normal sinus rhythm at 85 beats per minute.   CTA of the chest with IV contrast was negative for evidence of pulmonary embolism, however, showed extensive thoracic lymphadenopathy slightly increased with sclerotic changes at upper sternum in the area of previous lysis adjacent to the right internal mammary chain/parasternal lymph node/mass. Patient has been followed by GYN oncologist, Dr. Monika Rossi, with last visit in September. The patient had been on chemotherapy, but she had discontinued after having a tooth extraction surgery. Tonight patient has been given DuoNeb nebulizer treatment, prednisone 40 mg p.o., Benadryl 50 mg IV. ED started the patient on Rocephin 1 gram IV. There were no direct sources for any infection. She has been febrile with initial temperature of 100.0 degrees Fahrenheit. Other abnormal labs included potassium of 5.90, serum CO2 of 20, and hemoglobin 8.6, BUN of 27, creatinine 1.51 (compared to the last creatinine of 1.26 on 07/31/2018). Pt continues to be hyperkalemic on 11/27. FELICIA, Hb down from 8.6 to 7.9, requiring O2 thru NC as mildly hypoxic overnight,  
 
  
[place] MCG criteria Does  NOT apply STATUS DETERMINATION  On the basis of clinical data, available documentaion, we believe that the current status of this patient as INPATIENT is Appropriate The final decision of the patient's hospitalization status depends on the attending physician's judgment Additional comments Insurance  Payor: [employer] MEDICARE / Plan: [employer] MEDICARE CHOICE PPO/PFFS / Product Type: Managed Care Medicare / Insurance Information [facility] MEDIC[facility] CHOICE PPO/PFFS Phone:   
 Subscriber: Jossie Angelo Subscriber#: [id number] Group#: [id number] Precert#:   
  
 
  
 
 
 
 
Junaid Heredia MD 
Cell: [phone] Physician Advisor

## 2018-11-27 NOTE — TELEPHONE ENCOUNTER
Spoke with pt and reviewed her hospital notes with her. She states Hospitalist considering [facility] consult d/t chest mass pain. Dr. Celine Dhillon informed. Pt instructed to make appt with Dr. Celine Dhillon in office after discharge in f/u.

## 2018-11-27 NOTE — ED NOTES
Assumed care of patient at change of shift. CT pending. Patient with asthma exac. Generally weak, wbc nml, lactate ok. Belén with potassium 5.9- IV fluids given, will repeat K now. Patient overall feels better but not to baseline, still tachypnic, sats 92%. Diminished throughout with scattered wheezing persisting. [place] texted with hospitalist for admission.

## 2018-11-27 NOTE — ED NOTES
Patient called out on call bell and stated that IV to the Left [facility] was out. Patient cleaned up and gown changed. IVF moved to the right [facility]. Pharmacy at bedside

## 2018-11-27 NOTE — ED PROVIDER NOTES
63 y.o. female with past medical history significant for HTN, asthma, GERD, BRCA negative, rectal bleeding, morbid obesity, fibromyalgia, anemia, hyperlipidemia, CINV, thyroid disease, calculus of kidney, sleep apnea, arthritis, CKD, psychiatric disorder, heart murmur, diabetes, thromboembolus, ovarian cancer, and chest wall mass who presents from Urgent Care with chief complaint of chest pain. Patient states that she previously had ovarian cancer and was in remission until she was informed by her pulmonologist last year that she had a \"mass in her chest within her right lung that was protruding against her sternum. \"  She subsequently began receiving chemotherapy treatments. Patient indicates, however, that she stopped her chemotherapy treatments in April of 2018 in order to address certain dental problems. She notes that she has not had any chemotherapy treatments since stopping several months ago. Patient claims that a chest x-ray last month demonstrated that the mass in her chest had gotten smaller. She notes, however, that over the past 4 days, she has been experiencing chest pain, SOB, and difficulty breathing. She believes her chest mass within her lung has increased in size. She also complains of wheezing, fever, cough, and diaphoresis at night. She has taken Tylenol for her symptoms earlier today with no subsequent relief. Patient visited Urgent Care for her symptoms today and was advised to visit the ED. She had an x-ray of her chest done while there. Of note, patient has a port present in her left chest.  She states that she has lost weight within the past month but notes that she has been trying to. She adds that she believes her cervix is still intact after her ovary removal procedure. Patient denies hematemesis, nausea, vomiting, constipation, and diarrhea. There are no other acute medical concerns at this time. Social hx: negative tobacco use; rare alcohol use; negative drug use PCP: Deisy Kay MD 
 
Note written by Lamar Rhodes, as dictated by Sonali Loyd DO 8:37 PM 
 
 
 
 
The history is provided by the patient. No  was used. Past Medical History:  
Diagnosis Date  Adverse effect of anesthesia   
 sleep apnea cpap machine useage  Anemia  Arthritis DDD low back and knees  Asthma   
 uses albuterol prn only; none x 6 mos. Never hospitalized  BRCA negative 2013  Calculus of kidney  Chronic kidney disease   
 kidney stones  Chronic pain   
 knee pain bilat  CINV (chemotherapy-induced nausea and vomiting) 2014  Decreased hearing, right PATIENT STATES THIS IS DUE TO CHEMOTHERAPY  Diabetes ([address] Age 46 IDDM  Environmental allergies  Fibromyalgia  GERD (gastroesophageal reflux disease)   
 controlled with med  Hx of pulmonary embolus during pregnancy 2015  Hypertension  Ill-defined condition Sepsis  -  SOURCE PORT  
 Ill-defined condition 2016  
 chemotherapy  Mass of chest wall 2018  Morbid obesity ([address]  Murmur, heart  Other and unspecified hyperlipidemia  Ovarian cancer ([address] 2012, 2014  
 serous, high grade, stage IIIc. s/p chemotx (has port)  Psychiatric disorder  Rectal bleeding  Thromboembolus ([address]  POST PARTUM; resolved- PELVIS  Thyroid disease HYPOTHYROID  Unspecified sleep apnea CPAP, Dr. Dhaval North Past Surgical History:  
Procedure Laterality Date  BREAST SURGERY PROCEDURE UNLISTED Lymph node in left breast 2017  CHEST SURGERY PROCEDURE UNLISTED Placement of port a cath right chest  
 HX GYN    
  X2  
 HX HYSTERECTOMY  2012 ROULA/BSO, tumor debulking, omentectomy for ovarian cancer  HX ORTHOPAEDIC    
 ankle-FX, R  
 HX ORTHOPAEDIC    
 FX R ARM  
 HX UROLOGICAL    
 stent  HX VASCULAR ACCESS  2015  
 right chest- port placed Family History:  
Problem Relation Age of Onset  Hypertension Mother [facility] Arthritis-osteo Mother  Lung Disease Father COPD  Hypertension Father  Arthritis-osteo Father  Cancer Father PROSTATE  Hypertension Brother  Elevated Lipids Brother  Arthritis-osteo Brother  Hypertension Brother  Elevated Lipids Brother  Obesity Brother  Cancer Brother PROSTATE  Anesth Problems Son DELAYED AWAKENING  
 Diabetes Maternal Grandmother  Anesth Problems Paternal Grandmother PATIENT STATES GRANDMOTHER'S HEART STOPPED DURING SURGERY Social History Socioeconomic History  Marital status:  Spouse name: Not on file  Number of children: Not on file  Years of education: Not on file  Highest education level: Not on file Social Needs  Financial resource strain: Not on file  Food insecurity - worry: Not on file  Food insecurity - inability: Not on file  Transportation needs - medical: Not on file  Transportation needs - non-medical: Not on file Occupational History  Not on file Tobacco Use  Smoking status: Never Smoker  Smokeless tobacco: Never Used Substance and Sexual Activity  Alcohol use: Yes Comment: 2 DRINKS EVERY 2 MONTHS  Drug use: No  
 Sexual activity: Yes Other Topics Concern  Not on file Social History Narrative Lives in [place] alone.  passed away in 5/16 of a heart attack. Has 2 sons. Disability. 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 Enjoys swimming and going to the gym. ALLERGIES: Iodinated contrast- oral and iv dye; Carboplatin; Lipitor [atorvastatin]; Losartan; Olmesartan; Percocet [oxycodone-acetaminophen]; Shellfish containing products; Tape [adhesive]; and Tomato Review of Systems Constitutional: Positive for diaphoresis (at night) and fever. HENT: Negative for trouble swallowing. Eyes: Negative for visual disturbance. Respiratory: Positive for cough, chest tightness, shortness of breath and wheezing. Cardiovascular: Positive for chest pain. Gastrointestinal: Negative for abdominal pain, constipation, diarrhea, nausea and vomiting. Genitourinary: Negative for difficulty urinating. Musculoskeletal: Negative for gait problem. Skin: Negative for rash. Neurological: Negative for dizziness, weakness and headaches. Hematological: Does not bruise/bleed easily. Psychiatric/Behavioral: Negative for sleep disturbance. All other systems reviewed and are negative. Vitals:  
 11/26/18 1651 11/26/18 2046 11/26/18 2047 11/26/18 2047 BP:  134/73 Pulse: 91 82 Resp:  22 Temp:  100 °F (37.8 °C) SpO2: 96% 96% 96% 97% Physical Exam  
Constitutional: She is oriented to person, place, and time. She appears well-developed and well-nourished. HENT:  
Head: Normocephalic and atraumatic. Eyes: Conjunctivae are normal.  
Neck: Normal range of motion. Cardiovascular: Normal rate and intact distal pulses. Pulmonary/Chest: Effort normal. No respiratory distress. She has wheezes (diffuse inspiratory and expiratory wheezing). Port present in left chest  
Abdominal: Soft. Bowel sounds are normal. There is no tenderness. There is no rebound and no guarding. Musculoskeletal: Normal range of motion. She exhibits edema (bilateral LE edema with left greater than right). Neurological: She is alert and oriented to person, place, and time. Skin: Skin is warm and dry. Psychiatric: Her behavior is normal.  
Nursing note and vitals reviewed. Note written by Lamar Bartlett, as dictated by Lilia Mcdowell DO 8:37 PM 
 
 
MDM Pt received oral prednisone and benadryl prior to CTA instead of IV solumedrol because she was tolerating PO well and the onset of action is similar.   I do not believe IV solumedrol would work any differently, but I understand the usual approach at this hospital is IV steroids. Plan to proceed with CTA and give ceftriaxone for her fevers and dyspnea on exertion. Signed patient out to Dr. Diana Delgado prior to CT, but told her patient would likely need admission because she is having such a hard time breathing when she walks. Procedures PROGRESS NOTE: 
8:59 PM 
Patient had a CTA done on 7/17/18 after being given steroids for an allergy. A 4.3 x 3.9 cm mass was found on the right at the sternoclavicular junction with LAD and extensive retroperitoneal, peritoneal, and pelvic mass lesions. She is not low risk by Javier Shan' Criteria for PE. Will pre-medicate her and perform another CT.

## 2018-11-27 NOTE — ED TRIAGE NOTES
Pt arrives from home with complaints of chest pain. Pt has a history of ovarian cancer which has metastasized to her right lung. Pt reports not having chemo treatment since April. Pt was at urgent care this morning due to pain with right lung tumor, urgent care referred her to us to have a CT scan

## 2018-11-27 NOTE — TELEPHONE ENCOUNTER
Patient wants to get port flush appointments scheduled. She is in the hospital at [facility], and would like to know if it can be done while she's here?

## 2018-11-27 NOTE — H&P
[address] [facility][note 3] HISTORY AND PHYSICAL Didi Cortes 
MR#: [id number] : 1960 ACCOUNT #: [de-identified] ADMIT DATE: 2018 CHIEF COMPLAINT:  Chest pain. HISTORY OF PRESENT ILLNESS:  A 27-year-old -American female with past medical history of ovarian carcinoma with metastatic disease, anemia, arthritis, degenerative disk disease, asthma, chronic kidney disease, kidney stone, chronic pain, type 2 diabetes mellitus, fibromyalgia, GERD, hypertension, chest wall mass, morbid obesity, heart murmur, hyperlipidemia, hypothyroidism, sleep apnea, presented to the emergency department from home with chief complaints of chest pain. The patient notably has a chest wall mass and has chronic pain symptoms associated with the same. However, she notes her pain has worsened,  mostly located in the right side of the chest, rated an 8/10, dull aching, nonradiating, without specific other exacerbating or alleviating factors. She complains of shortness of breath and cough. There are other reports of wheezing, fever, diaphoresis. She reportedly had taken Tylenol without relief of symptoms. She went to urgent care center yesterday for evaluation and subsequently referred to the ED. On arrival to the emergency department, initial recorded vital signs of blood pressure 134/73, heart rate 91, respiratory rate of 22, O2 saturation 96% on room air. Workup included chest x-ray, PA and lateral, showing no acute cardiopulmonary process. Twelve-lead EKG showed normal sinus rhythm at 85 beats per minute. CTA of the chest with IV contrast was negative for evidence of pulmonary embolism, however, showed extensive thoracic lymphadenopathy slightly increased with sclerotic changes at upper sternum in the area of previous lysis adjacent to the right internal mammary chain/parasternal lymph node/mass.   Patient has been followed by GYN oncologist, Dr. Gail Pizarro Jon Burks, with last visit in September. The patient had been on chemotherapy, but she had discontinued after having a tooth extraction surgery. Tonight patient has been given DuoNeb nebulizer treatment, prednisone 40 mg p.o., Benadryl 50 mg IV. ED started the patient on Rocephin 1 gram IV. There were no direct sources for any infection. She has been febrile with initial temperature of 100.0 degrees Fahrenheit. Other abnormal labs included potassium of 5.90, serum CO2 of 20, and hemoglobin 8.6, BUN of 27, creatinine 1.51 (compared to the last creatinine of 1.26 on 2018). Patient is now seen for admission to the hospitalist service for continued evaluation and treatment. The patient does not complain of any current dizziness, lightheadedness, focal weakness, new-onset numbness, paresthesias, slurred speech, facial droop, headache, neck pain, back pain, abdominal pain, nausea, vomiting, diarrhea, melena, dysuria, hematuria, calf pain, increased leg swelling, edema. PAST MEDICAL HISTORY:   
1. Anemia. 2.  Sleep apnea. 3.  Degenerative disk disease. 4.  Arthritis. 5.  Asthma. 6.  Kidney stone. 7.  Chronic kidney disease. 8.  Chronic knee pain. 9.  Type 2 diabetes mellitus. 10.  Fibromyalgia. 11.  GERD. 12.  Hypertension. 13.  Sepsis. 14.  Morbid obesity. 15.  Heart murmur. 16.  PE during pregnancy. 17.  Hypothyroidism. PAST SURGICAL HISTORY:   
1. Lymph node infection, 2017.   
2.  Placement of right Port-A-Cath. 3.   section x2.   
4.  Hysterectomy. 5.  ORIF of right ankle fracture. 6.  ORIF right humerus fracture. MEDICATIONS:  Complete medication list reviewed and noted on chart record. albuterol (PROVENTIL VENTOLIN) 2.5 mg /3 mL (0.083 %) nebulizer solution  2018  --  --  Provider, Historical   
 2.5 mg by Nebulization route every four (4) hours as needed for Wheezing. ascorbic acid, vitamin C, (VITAMIN C) 1,000 mg tablet  11/26/2018  --  --  Provider, Historical  
 Take 1,000 mg by mouth daily. azelastine (ASTELIN) 137 mcg (0.1 %) nasal spray  11/26/2018  --  --  Provider, Historical  
 1 [place] by Both Nostrils route daily. Use in each nostril as directed   
 cholecalciferol, VITAMIN D3, (VITAMIN D3) 5,000 unit tab tablet  11/26/2018  --  --  Provider, Historical  
 Take 5,000 Units by mouth daily. clonazePAM (KLONOPIN) 0.5 mg tablet    --  --  Provider, Historical  
 Take 0.5 mg by mouth nightly as needed. Dexlansoprazole (DEXILANT) 60 mg CpDB  11/26/2018 05/14/18  --  Yee Fitzpatrick MD  
 Take 1 Cap by mouth daily. furosemide (LASIX) 40 mg tablet  11/25/2018  09/15/15  --  Provider, Historical  
 Take 40 mg by mouth every other day. insulin aspart U-100 (NOVOLOG FLEXPEN U-100 INSULIN) 100 unit/mL inpn  11/26/2018  10/29/18  --  Yee Fitzpatrick MD  
 INJECT 20 UNITS BEFORE EACH MEAL + 4 UNITS FOR EVERY 50 MG/DL ABOVE 150 MG/DL.  UNITS PER DAY  
 insulin detemir U-100 (LEVEMIR) 100 unit/mL injection  11/26/2018  --  --  Provider, Historical  
 20 Units by SubCUTAneous route daily (with lunch). levothyroxine (SYNTHROID) 150 mcg tablet  11/26/2018 03/30/18  -- Elesa Severance, MD  
 TAKE 1 TAB BY MOUTH DAILY (BEFORE BREAKFAST). --CALL [phone] FOR APPOINTMENT FOR MORE REFILLS  
 lidocaine-prilocaine (EMLA) topical cream    04/24/18  -- Doris Ritter MD  
 Apply small amount over port area one hour before chemo treatment and cover with a Band-Aid  
 linaclotide (LINZESS) 145 mcg cap capsule    --  --  Provider, Historical  
 Take 145 mcg by mouth daily as needed. Liraglutide (VICTOZA) 0.6 mg/0.1 mL (18 mg/3 mL) pnij  11/26/2018 03/27/18  --  Yee Fitzpatrick MD  
 1.8 mg by SubCUTAneous route daily (with lunch). montelukast (SINGULAIR) 10 mg tablet    --  --  Provider, Historical  
 Take 10 mg by mouth nightly. omega-3 acid ethyl esters (LOVAZA) 1 gram capsule  11/26/2018 07/09/18  --  Puja Fitzpatrick MD  
 TAKE ONE CAPSULE BY MOUTH TWICE A DAY  
 ondansetron (ZOFRAN ODT) 8 mg disintegrating tablet    --  --  Provider, Historical  
 Take 8 mg by mouth as needed for Nausea (When receiving chemotherapy). oxyCODONE-acetaminophen (PERCOCET) 5-325 mg per tablet    --  --  Provider, Historical  
 Take 1 Tab by mouth as needed for Pain (When receiving chemotherapy). potassium chloride (KLOR-CON M10) 10 mEq tablet    11/14/18  -- Rosmery Salas MD  
 TAKE 1 TAB BY MOUTH TWO (2) DAYS A WEEK. WED AND FRI  
 sertraline (ZOLOFT) 50 mg tablet  11/26/2018 02/18/17  --  Provider, Historical  
 TAKE 1 TABLET BY MOUTH EVERY MORNING  
 SYMBICORT 160-4.5 mcg/actuation HFAA    12/26/17  -- Arleen Dorman MD  
 Take 2 Puffs by inhalation two (2) times daily as needed. telmisartan (MICARDIS) 20 mg tablet  11/26/2018  10/29/18  --  Puja Fitzpatrick MD  
 Take 1 Tab by mouth daily. ALLERGIES:  CARBOPLATIN, LIPITOR, LOSARTAN, OLMESARTAN, PERCOCET; TAPE, TOMATOES, IODINATED CONTRAST, ORAL AND IV DYE. SOCIAL HISTORY:  Negative for smoking. Patient denies alcohol and illicit drugs. . FAMILY HISTORY:  Hypertension, mother, father, brother. Osteoarthritis, mother, father, brother. Elevated lipids, brother. Prostate cancer, brother. Diabetes, maternal grandmother. COPD, father. REVIEW OF SYSTEMS:  Pertinent positives are as noted in the HPI. All other systems were reviewed and negative. PHYSICAL EXAMINATION:   
VITAL SIGNS:  Temperature 98.4 degrees Fahrenheit, blood pressure 150/70, heart rate of 79, respiratory rate of 25, O2 saturation 94% on room air, recorded weight 326 pounds (148.3 kg), recorded height of 5 feet 9 inches tall, BMI 48.1. GENERAL:  Morbidly obese female in no acute respiratory distress.    
PSYCHIATRIC:  The patient was initially slightly somnolent but then aroused with loud verbal stimuli and orients x3. NEUROLOGIC:  GCS of 15, best response while awake. Moves extremities x4. Generalized weakness. Sensation grossly intact without slurred speech, facial droop. No pronator drift. HEENT:  Normocephalic, atraumatic. PERRLA. EOMs intact. Sclerae are anicteric. Conjunctivae are clear. Nares are patent. Oropharynx clear. Tongue is midline, not edematous. NECK:  Supple, without lymphadenopathy, JVD, carotid bruits, thyromegaly. LYMPHATIC:  Negative for cervical or supraclavicular adenopathy. RESPIRATORY:  Lungs:  Bilateral wheezes. CARDIOVASCULAR:  Heart regular rate, rhythm. Normal S1, S2, without murmurs, rubs, or gallops. GASTROINTESTINAL:  Abdomen is obese, soft, nontender, nondistended. Normoactive bowel sounds. No rebound, guarding, rigidity. No auscultated abdominal bruits, no palpable abdominal mass. BACK:  No CVA tenderness, no step-off deformity. MUSCULOSKELETAL:  No acute palpable bony deformity. Negative for calf tenderness. VASCULAR:  2+ radial, 1+ dorsalis pedis pulses without cyanosis, clubbing and has 1+ pitting edema bilateral lower extremities. SKIN:  Warm and dry. LABORATORY DATA:  Reviewed. Sodium is 138, potassium 5.9, repeat potassium 6.0, chloride 108, CO2 of 20, BUN of 27, creatinine 1.51, glucose of 112, anion gap of 10, calcium 9.1, GFR of 43, total bilirubin 0.6, total protein 9.0, albumin is 3.6, ALT of 30, AST of 42, alkaline phosphatase of 78, WBC of 8.6, hemoglobin 8.6, hematocrit of 28.9, platelets 339, neutrophils of 65%. Urinalysis:  Leukocyte esterase trace, nitrites negative, urobilinogen 0.2, bilirubin negative, blood small, ketones negative, glucose negative, protein 100, pH of 5.0, specific gravity 1.020, WBCs 5-10, RBCs 0-5, bacteria negative.    
 
IMAGING:  CTA of the chest with and without contrast results are reviewed and noted in the HPI. Chest x-ray, PA and lateral, results noted in HPI. Twelve-lead EKG results noted. IMPRESSION AND PLAN:   
1. Acute chest pain. Likely related to noted chest wall mass with more extensive lymphadenopathy reported per CT report. Negative for pulmonary embolus. Repeat troponin levels. Place on telemetry monitoring. 2.  Dehydration. Provide 0.9% normal saline for IV fluid hydration resuscitation. Repeat the renal panel in the a.m. Place on strict I's and O's.   
3.  Acute hyperkalemia. Patient had received 1 liter bolus of IV fluids with no improvement. She also had received albuterol. Have ordered calcium gluconate 1 gram IV, Reglan continuous IV, 1 amp of D50 IV, and sodium bicarbonate 1 amp IV. Repeat potassium levels after these noted interventions. Continue on telemetry. Monitor closely. 4.  Thrombocytopenia. Repeat platelet count. 5.  Anemia. Repeat hemoglobin and hematocrit. Check stool occult blood test, iron profile, B12, and folate levels. 6.  Metabolic acidosis with decreased serum CO2. Repeat labs in the a.m.    
7.  Morbid obesity. Would advise eventual weight loss, heart healthy diet, and lifestyle modification. 8.  Fever, low grade. Continue with temperature monitoring, however, has not met the criteria for systemic inflammatory response syndrome. 9.  Metastatic carcinoma with history of ovarian cancer. Consider for a GYN oncologist consultation and evaluation. 10.  Shortness of breath. Provide supplemental oxygen since patient has been intermittently hypoxic. Place on pulse oximetry monitoring. 11.  Venous thrombosis prophylaxis. Sequential compression devices to bilateral lower extremities. MD LUCY Samaniego/ 
D: 11/27/2018 03:45 T: 11/27/2018 05:04 JOB #: [id number]

## 2018-11-27 NOTE — PROCEDURES
[address] 
*** FINAL REPORT *** Name: Layo Rosales MRN: [id number] : 1960 [facility] Order #: [id number] [facility] Visit #: [id number] Date: 2018 TYPE OF TEST: Peripheral Venous Testing REASON FOR TEST Pain in limb, Limb swelling Right Leg:- 
Deep venous thrombosis:           No 
Superficial venous thrombosis:    No 
Deep venous insufficiency:        Not examined Superficial venous insufficiency: Not examined Left Leg:- 
Deep venous thrombosis:           No 
Superficial venous thrombosis:    No 
Deep venous insufficiency:        Not examined Superficial venous insufficiency: Not examined INTERPRETATION/FINDINGS 
PROCEDURE:  Color duplex ultrasound imaging of lower extremity veins. FINDINGS: 
     Right:  Suboptimal images due to body habitus. The common 
femoral, deep femoral, femoral, popliteal, and great saphenous are 
patent and without evidence of thrombus;  each is fully compressible 
and there is no narrowing of the flow channel on color Doppler 
imaging. Phasic flow is observed in the common femoral vein. Limited 
 visualization of the posterior tibial veins. Peroneal veins not 
visualized. Left:   Suboptimal images due to body habitus. The common 
femoral, deep femoral, femoral, popliteal, and great saphenous are 
patent and without evidence of thrombus;  each is fully compressible 
and there is no narrowing of the flow channel on color Doppler 
imaging. Phasic flow is observed in the common femoral vein. Limited 
 visualization of the posterior tibial and peroneal veins. IMPRESSION:  No evidence of right or left lower extremity vein 
thrombosis where visualized, however due to limited visualization of 
calf veins bilaterally, isolated calf vein thrombosis cannot be 
excluded. ADDITIONAL COMMENTS I have personally reviewed the data relevant to the interpretation of 
this 
study.  
 
TECHNOLOGIST: Omari Miranda 
 Signed: 11/26/2018 10:57 PM 
 
PHYSICIAN: Karlos Langston MD 
Signed: 11/27/2018 08:10 AM

## 2018-11-28 LAB
ANION GAP SERPL CALC-SCNC: 10 MMOL/L (ref 5–15)
BASOPHILS # BLD: 0 K/UL (ref 0–0.1)
BASOPHILS NFR BLD: 0 % (ref 0–1)
BUN SERPL-MCNC: 26 MG/DL (ref 6–20)
BUN/CREAT SERPL: 21 (ref 12–20)
CALCIUM SERPL-MCNC: 8.3 MG/DL (ref 8.5–10.1)
CHLORIDE SERPL-SCNC: 111 MMOL/L (ref 97–108)
CO2 SERPL-SCNC: 20 MMOL/L (ref 21–32)
CREAT SERPL-MCNC: 1.23 MG/DL (ref 0.55–1.02)
DIFFERENTIAL METHOD BLD: ABNORMAL
EOSINOPHIL # BLD: 0.4 K/UL (ref 0–0.4)
EOSINOPHIL NFR BLD: 6 % (ref 0–7)
ERYTHROCYTE [DISTWIDTH] IN BLOOD BY AUTOMATED COUNT: 13.6 % (ref 11.5–14.5)
GLUCOSE BLD STRIP.AUTO-MCNC: 102 MG/DL (ref 65–100)
GLUCOSE BLD STRIP.AUTO-MCNC: 106 MG/DL (ref 65–100)
GLUCOSE BLD STRIP.AUTO-MCNC: 112 MG/DL (ref 65–100)
GLUCOSE BLD STRIP.AUTO-MCNC: 93 MG/DL (ref 65–100)
GLUCOSE SERPL-MCNC: 115 MG/DL (ref 65–100)
HCT VFR BLD AUTO: 25.9 % (ref 35–47)
HGB BLD-MCNC: 7.7 G/DL (ref 11.5–16)
IMM GRANULOCYTES # BLD: 0 K/UL (ref 0–0.04)
IMM GRANULOCYTES NFR BLD AUTO: 0 % (ref 0–0.5)
LYMPHOCYTES # BLD: 1.4 K/UL (ref 0.8–3.5)
LYMPHOCYTES NFR BLD: 19 % (ref 12–49)
MCH RBC QN AUTO: 28.5 PG (ref 26–34)
MCHC RBC AUTO-ENTMCNC: 29.7 G/DL (ref 30–36.5)
MCV RBC AUTO: 95.9 FL (ref 80–99)
MONOCYTES # BLD: 0.9 K/UL (ref 0–1)
MONOCYTES NFR BLD: 13 % (ref 5–13)
NEUTS SEG # BLD: 4.2 K/UL (ref 1.8–8)
NEUTS SEG NFR BLD: 61 % (ref 32–75)
NRBC # BLD: 0 K/UL (ref 0–0.01)
NRBC BLD-RTO: 0 PER 100 WBC
PLATELET # BLD AUTO: 136 K/UL (ref 150–400)
PMV BLD AUTO: 9.6 FL (ref 8.9–12.9)
POTASSIUM SERPL-SCNC: 4.6 MMOL/L (ref 3.5–5.1)
RBC # BLD AUTO: 2.7 M/UL (ref 3.8–5.2)
SERVICE CMNT-IMP: ABNORMAL
SERVICE CMNT-IMP: NORMAL
SODIUM SERPL-SCNC: 141 MMOL/L (ref 136–145)
TROPONIN I SERPL-MCNC: <0.05 NG/ML
WBC # BLD AUTO: 7 K/UL (ref 3.6–11)

## 2018-11-28 PROCEDURE — 74011250637 HC RX REV CODE- 250/637: Performed by: INTERNAL MEDICINE

## 2018-11-28 PROCEDURE — 82962 GLUCOSE BLOOD TEST: CPT

## 2018-11-28 PROCEDURE — 84484 ASSAY OF TROPONIN QUANT: CPT

## 2018-11-28 PROCEDURE — 94760 N-INVAS EAR/PLS OXIMETRY 1: CPT

## 2018-11-28 PROCEDURE — 65210000002 HC RM PRIVATE GYN

## 2018-11-28 PROCEDURE — 74011250636 HC RX REV CODE- 250/636: Performed by: FAMILY MEDICINE

## 2018-11-28 PROCEDURE — 74011636637 HC RX REV CODE- 636/637: Performed by: INTERNAL MEDICINE

## 2018-11-28 PROCEDURE — 36415 COLL VENOUS BLD VENIPUNCTURE: CPT

## 2018-11-28 PROCEDURE — 85025 COMPLETE CBC W/AUTO DIFF WBC: CPT

## 2018-11-28 PROCEDURE — 80048 BASIC METABOLIC PNL TOTAL CA: CPT

## 2018-11-28 PROCEDURE — 74011250637 HC RX REV CODE- 250/637: Performed by: FAMILY MEDICINE

## 2018-11-28 RX ORDER — CODEINE PHOSPHATE AND GUAIFENESIN 10; 100 MG/5ML; MG/5ML
5 SOLUTION ORAL
Status: DISCONTINUED | OUTPATIENT
Start: 2018-11-28 | End: 2018-11-29 | Stop reason: HOSPADM

## 2018-11-28 RX ORDER — DEXTROSE 50 % IN WATER (D50W) INTRAVENOUS SYRINGE
12.5-25 AS NEEDED
Status: DISCONTINUED | OUTPATIENT
Start: 2018-11-28 | End: 2018-11-29 | Stop reason: HOSPADM

## 2018-11-28 RX ORDER — ACETAMINOPHEN 325 MG/1
650 TABLET ORAL ONCE
Status: COMPLETED | OUTPATIENT
Start: 2018-11-28 | End: 2018-11-28

## 2018-11-28 RX ORDER — MAGNESIUM SULFATE 100 %
4 CRYSTALS MISCELLANEOUS AS NEEDED
Status: DISCONTINUED | OUTPATIENT
Start: 2018-11-28 | End: 2018-11-29 | Stop reason: HOSPADM

## 2018-11-28 RX ORDER — INSULIN LISPRO 100 [IU]/ML
INJECTION, SOLUTION INTRAVENOUS; SUBCUTANEOUS
Status: DISCONTINUED | OUTPATIENT
Start: 2018-11-28 | End: 2018-11-29 | Stop reason: HOSPADM

## 2018-11-28 RX ADMIN — SODIUM CHLORIDE 125 ML/HR: 900 INJECTION, SOLUTION INTRAVENOUS at 05:30

## 2018-11-28 RX ADMIN — PANTOPRAZOLE SODIUM 40 MG: 40 TABLET, DELAYED RELEASE ORAL at 08:10

## 2018-11-28 RX ADMIN — INSULIN GLARGINE 20 UNITS: 100 INJECTION, SOLUTION SUBCUTANEOUS at 11:06

## 2018-11-28 RX ADMIN — ACETAMINOPHEN 650 MG: 325 TABLET ORAL at 22:36

## 2018-11-28 RX ADMIN — VITAMIN D, TAB 1000IU (100/BT) 5000 UNITS: 25 TAB at 08:38

## 2018-11-28 RX ADMIN — Medication 10 ML: at 22:36

## 2018-11-28 RX ADMIN — MONTELUKAST SODIUM 10 MG: 10 TABLET, FILM COATED ORAL at 22:36

## 2018-11-28 RX ADMIN — Medication 10 ML: at 06:00

## 2018-11-28 RX ADMIN — SODIUM CHLORIDE 125 ML/HR: 900 INJECTION, SOLUTION INTRAVENOUS at 17:11

## 2018-11-28 RX ADMIN — SERTRALINE HYDROCHLORIDE 50 MG: 50 TABLET ORAL at 08:38

## 2018-11-28 RX ADMIN — LEVOTHYROXINE SODIUM 150 MCG: 150 TABLET ORAL at 08:11

## 2018-11-28 RX ADMIN — FLUTICASONE PROPIONATE 2 SPRAY: 50 SPRAY, METERED NASAL at 11:04

## 2018-11-28 RX ADMIN — Medication 10 ML: at 15:38

## 2018-11-28 RX ADMIN — GUAIFENESIN AND CODEINE PHOSPHATE 5 ML: 10; 100 LIQUID ORAL at 16:26

## 2018-11-28 NOTE — PROGRESS NOTES
Care Management Interventions PCP Verified by CM: Yes(Dr PRANAV Fitzpatrick  ) Last Visit to PCP: 11/14/18 Palliative Care Criteria Met (RRAT>21 & CHF Dx)?: Yes 
Palliative Consult Recommended?: No 
Reason Palliative Care Not Recommended?: Provider preference to wait Mode of Transport at Discharge: (son Marii Lover cell [phone]) Transition of Care Consult (CM Consult): (tbd) MyChart Signup: No 
Discharge Durable Medical Equipment: No 
Physical Therapy Consult: No 
Occupational Therapy Consult: No 
Speech Therapy Consult: No 
Plan discussed with Pt/Family/Caregiver: (patient ) Freedom of Choice Offered: Yes [employer] Information Provided?: No 
Discharge Location Discharge Placement: (tbd most likely home )  met with patient who remains optimistic and notes she is starting to feel better. She uses the local [facility] order for her medications. She has a portacatheter and is independent in keeping port flushed.   Care management will continue to follow for transitions of care needs while in hospital.

## 2018-11-28 NOTE — PROGRESS NOTES
Hospitalist Progress Note Alejandro Felipe MD 
Answering service: [phone] OR [phone] from in house phone Date of Service:  2018 NAME:  Claude Kobus Tucker-Walters :  1960 MRN:  [id number] Admission Summary:  
58F hx of widely metastatic Ovarian cancer, with thoracic lymphadenopathy, right chest wall mass. P/w chest pain Interval history / Subjective:  
Patient seen and examined at bedside, describes her chronic chest pain, doesn't sound cardiac Assessment & Plan: #. Chest pain: likely related to cancer. Has chronic pain 
- usually controlled with her pain regimen, recently not controlled, may require more often dose 
- EKG normal sinus on admission. No trops checked, will check now, no hx of CAD 
- Analgesia prn, Oncology following, seen pt in ED #. Metastatic Ovarian Ca: symptom control, Gyn/Onc following. #. DM2: Lantus and SSI, monitor #. Dehydration: resolved. #. FELICIA: 2nd to above, improving, monitor #. Hyperkalemia: resolved. #. Thrombocytopnia: monitor #. Chronic anemia: Ca related, monitor, transfuse if needed. #. Obesity #. Fatigue: related to Ca, PT/OT #. Dry cough and wheeze: Ca related, CTA chest -ve. Symptomatic Mg. Code status: Full DVT prophylaxis: SCDs Care Plan discussed with: Patient/Family and Nurse Disposition: TBD Hospital Problems  Date Reviewed: 2018 Codes Class Noted POA Dehydration ICD-10-CM: E86.0 ICD-9-CM: 276.51  2018 Unknown Acute hyperkalemia ICD-10-CM: E87.5 ICD-9-CM: 276.7  2018 Unknown * (Principal) Acute chest pain ICD-10-CM: R07.9 ICD-9-CM: 786.50  2018 Unknown Fever ICD-10-CM: R50.9 ICD-9-CM: 780.60  2018 Unknown Mass of chest wall ICD-10-CM: R22.2 ICD-9-CM: 786.6  2018 Unknown Thrombocytopenia ([facility] [address] ICD-10-CM: D69.6 ICD-9-CM: 287.5  2018 Yes Moderate asthma with acute exacerbation ICD-10-CM: J45.901 ICD-9-CM: 493.92  11/27/2018 Yes  
   
 SOB (shortness of breath) ICD-10-CM: R06.02 
ICD-9-CM: 786.05  7/17/2018 Unknown Hypothyroidism ICD-10-CM: E03.9 ICD-9-CM: 244.9  7/17/2018 Unknown Anemia ICD-10-CM: D64.9 ICD-9-CM: 285.9  7/17/2018 Unknown Reactive depression ICD-10-CM: F32.9 ICD-9-CM: 300.4  5/31/2018 Yes Chest wall mass ICD-10-CM: R22.2 ICD-9-CM: 786.6  4/10/2018 Yes Lymphadenopathy, mediastinal ICD-10-CM: R59.0 ICD-9-CM: 785.6  7/18/2017 Yes Pulmonary hypertension, mild (HCC) ICD-10-CM: I27.20 ICD-9-CM: 416.8  3/28/2017 Yes Ovarian cancer ([facility] ICD-10-CM: C56.9 ICD-9-CM: 183.0  4/28/2015 Yes Benign essential hypertension ICD-10-CM: I10 
ICD-9-CM: 401.1  4/4/2014 Yes SHABANA on CPAP ICD-10-CM: G47.33, Z99.89 ICD-9-CM: 327.23, V46.8  2/28/2014 Yes Carcinomatosis ([facility] ICD-10-CM: C80.0 ICD-9-CM: 199.0  9/22/2012 Yes Morbid obesity ([facility] ICD-10-CM: E66.01 
ICD-9-CM: 278.01  9/22/2012 Yes Review of Systems:  
Pertinent items are mentioned in interval history. Vital Signs:  
 Last 24hrs VS reviewed since prior progress note. Most recent are: 
Visit Vitals /84 (BP 1 Location: Left arm, BP Patient Position: Sitting) Pulse 71 Temp 98.1 °F (36.7 °C) Resp 18 Wt 144.9 kg (319 lb 6.4 oz) SpO2 95% BMI 47.17 kg/m² Intake/Output Summary (Last 24 hours) at 11/28/2018 0920 Last data filed at 11/28/2018 3426 Gross per 24 hour Intake 777.08 ml Output 1201 ml Net -423.92 ml Physical Examination:  
General:  Alert, oriented, No acute distress Card:  S1, S2 without murmurs, good peripheral perfusion, No peripheral edema Resp:  No accessory muscle use, clear breath sounds with wheezes mostly on right side. No rhonchi. Right chest wall mass 10cm size on R SCJ area Abd:  Soft, non-tender, non-distended, BS+, Morbidly obese Extremities:  No cyanosis or clubbing, no significant edema Neuro:  Grossly normal, no focal neuro deficits, follows commands Psych:  Good insight, AAOx3, not agitated. Data Review:  
 Review and/or order of clinical lab test 
Review and/or order of tests in the radiology section of [facility] Labs:  
 
Recent Labs  
  11/28/18 
0529 11/27/18 
9257 WBC 7.0 6.5 HGB 7.7* 7.9*  
HCT 25.9* 26.4*  
* 133* Recent Labs  
  11/28/18 
0531 11/27/18 
0519 11/27/18 
0131 11/26/18 
2132  138  --  138  
K 4.6 5.2* 6.0* 5.9*  
* 109*  --  108 CO2 20* 20*  --  20* BUN 26* 26*  --  27* CREA 1.23* 1.41*  --  1.51* * 225*  --  112* CA 8.3* 8.5  --  9.1 Recent Labs  
  11/27/18 0519 11/26/18 2132 SGOT 33 42* ALT 25 30 AP 71 78 TBILI 0.4 0.6 TP 8.2 9.0* ALB 3.0* 3.6 GLOB 5.2* 5.4* No results for input(s): INR, PTP, APTT in the last 72 hours. No lab exists for component: INREXT No results for input(s): FE, TIBC, PSAT, FERR in the last 72 hours. No results found for: FOL, RBCF No results for input(s): PH, PCO2, PO2 in the last 72 hours. No results for input(s): CPK, CKNDX, TROIQ in the last 72 hours. No lab exists for component: CPKMB Lab Results Component Value Date/Time Cholesterol, total 216 (H) 03/28/2018 11:47 AM  
 HDL Cholesterol 39 (L) 03/28/2018 11:47 AM  
 LDL, calculated 142 (H) 03/28/2018 11:47 AM  
 Triglyceride 173 (H) 03/28/2018 11:47 AM  
 
Lab Results Component Value Date/Time Glucose (POC) 112 (H) 11/28/2018 06:46 AM  
 Glucose (POC) 145 (H) 11/27/2018 09:59 PM  
 Glucose (POC) 112 (H) 11/27/2018 01:04 PM  
 Glucose (POC) 123 (H) 07/19/2018 11:46 AM  
 Glucose (POC) 113 (H) 07/19/2018 06:39 AM  
 
Lab Results Component Value Date/Time  Color YELLOW/STRAW 11/27/2018 01:30 AM  
 Appearance CLOUDY (A) 11/27/2018 01:30 AM  
 Specific gravity 1.020 11/27/2018 01:30 AM  
 Specific gravity 1.019 04/03/2017 01:02 PM  
 pH (UA) 5.0 11/27/2018 01:30 AM  
 Protein 100 (A) 11/27/2018 01:30 AM  
 Glucose NEGATIVE  11/27/2018 01:30 AM  
 Ketone NEGATIVE  11/27/2018 01:30 AM  
 Bilirubin NEGATIVE  11/27/2018 01:30 AM  
 Urobilinogen 0.2 11/27/2018 01:30 AM  
 Nitrites NEGATIVE  11/27/2018 01:30 AM  
 Leukocyte Esterase TRACE (A) 11/27/2018 01:30 AM  
 Epithelial cells FEW 11/27/2018 01:30 AM  
 Bacteria NEGATIVE  11/27/2018 01:30 AM  
 WBC 5-10 11/27/2018 01:30 AM  
 RBC 0-5 11/27/2018 01:30 AM  
 
 
Medications Reviewed:  
 
Current Facility-Administered Medications Medication Dose Route Frequency  insulin lispro (HUMALOG) injection   SubCUTAneous AC&HS  
 glucose chewable tablet 16 g  4 Tab Oral PRN  
 dextrose (D50W) injection syrg 12.5-25 g  12.5-25 g IntraVENous PRN  
 glucagon (GLUCAGEN) injection 1 mg  1 mg IntraMUSCular PRN  cholecalciferol (VITAMIN D3) tablet 5,000 Units  5,000 Units Oral DAILY  clonazePAM (KlonoPIN) tablet 0.5 mg  0.5 mg Oral QHS PRN  
 fluticasone (FLONASE) 50 mcg/actuation nasal spray 2 Spray  2 Spray Both Nostrils DAILY  levothyroxine (SYNTHROID) tablet 150 mcg  150 mcg Oral ACB  montelukast (SINGULAIR) tablet 10 mg  10 mg Oral QHS  sertraline (ZOLOFT) tablet 50 mg  50 mg Oral DAILY  sodium chloride (NS) flush 5-10 mL  5-10 mL IntraVENous PRN  
 0.9% sodium chloride infusion  125 mL/hr IntraVENous CONTINUOUS  
 sodium chloride (NS) flush 5-10 mL  5-10 mL IntraVENous Q8H  
 sodium chloride (NS) flush 5-10 mL  5-10 mL IntraVENous PRN  pantoprazole (PROTONIX) tablet 40 mg  40 mg Oral ACB  insulin glargine (LANTUS) injection 20 Units  20 Units SubCUTAneous ACL  furosemide (LASIX) tablet 40 mg  40 mg Oral EVERY OTHER DAY  albuterol-ipratropium (DUO-NEB) 2.5 MG-0.5 MG/3 ML  3 mL Nebulization Q4H PRN  
 
______________________________________________________________________ EXPECTED LENGTH OF STAY: 2d 19h ACTUAL LENGTH OF STAY:          1 
            
 Bonney Bernheim, MD

## 2018-11-28 NOTE — PROGRESS NOTES
participated in [facility] rounds this am.  Pt remains on telemetry at this time. Per patient's nurse and CCL patient will be moved to a medical oncology unit when stable.

## 2018-11-28 NOTE — PROGRESS NOTES
Patient up to bathroom with assist. No complaints of pain or SOB at this time. 2200  
 
2330 patient is resting in bed no complaints of pain or SOB at this time. 0400 patient is resting in bed no complaints of pain or SOB at this time. Blood drawn and sent to lab. 
 
0730 Bedside and Verbal shift change report given to Sanya Harmon RN  (oncoming nurse) by Cecil Adams RN  (offgoing nurse). Report included the following information SBAR, MAR, Recent Results and Med Rec Status.

## 2018-11-29 VITALS
SYSTOLIC BLOOD PRESSURE: 132 MMHG | RESPIRATION RATE: 16 BRPM | DIASTOLIC BLOOD PRESSURE: 82 MMHG | TEMPERATURE: 98 F | OXYGEN SATURATION: 94 % | BODY MASS INDEX: 46.84 KG/M2 | HEART RATE: 68 BPM | WEIGHT: 293 LBS

## 2018-11-29 LAB
ANION GAP SERPL CALC-SCNC: 8 MMOL/L (ref 5–15)
BUN SERPL-MCNC: 22 MG/DL (ref 6–20)
BUN/CREAT SERPL: 20 (ref 12–20)
CALCIUM SERPL-MCNC: 8.2 MG/DL (ref 8.5–10.1)
CHLORIDE SERPL-SCNC: 111 MMOL/L (ref 97–108)
CO2 SERPL-SCNC: 21 MMOL/L (ref 21–32)
CREAT SERPL-MCNC: 1.12 MG/DL (ref 0.55–1.02)
GLUCOSE BLD STRIP.AUTO-MCNC: 118 MG/DL (ref 65–100)
GLUCOSE BLD STRIP.AUTO-MCNC: 92 MG/DL (ref 65–100)
GLUCOSE SERPL-MCNC: 89 MG/DL (ref 65–100)
POTASSIUM SERPL-SCNC: 4.8 MMOL/L (ref 3.5–5.1)
SERVICE CMNT-IMP: ABNORMAL
SERVICE CMNT-IMP: NORMAL
SODIUM SERPL-SCNC: 140 MMOL/L (ref 136–145)

## 2018-11-29 PROCEDURE — 74011636637 HC RX REV CODE- 636/637: Performed by: INTERNAL MEDICINE

## 2018-11-29 PROCEDURE — 97535 SELF CARE MNGMENT TRAINING: CPT | Performed by: OCCUPATIONAL THERAPIST

## 2018-11-29 PROCEDURE — 97116 GAIT TRAINING THERAPY: CPT

## 2018-11-29 PROCEDURE — 82962 GLUCOSE BLOOD TEST: CPT

## 2018-11-29 PROCEDURE — G8980 MOBILITY D/C STATUS: HCPCS

## 2018-11-29 PROCEDURE — G8978 MOBILITY CURRENT STATUS: HCPCS

## 2018-11-29 PROCEDURE — 97165 OT EVAL LOW COMPLEX 30 MIN: CPT | Performed by: OCCUPATIONAL THERAPIST

## 2018-11-29 PROCEDURE — 97161 PT EVAL LOW COMPLEX 20 MIN: CPT

## 2018-11-29 PROCEDURE — 74011250637 HC RX REV CODE- 250/637: Performed by: FAMILY MEDICINE

## 2018-11-29 PROCEDURE — G8979 MOBILITY GOAL STATUS: HCPCS

## 2018-11-29 PROCEDURE — 80048 BASIC METABOLIC PNL TOTAL CA: CPT

## 2018-11-29 PROCEDURE — 74011250637 HC RX REV CODE- 250/637: Performed by: INTERNAL MEDICINE

## 2018-11-29 PROCEDURE — 36415 COLL VENOUS BLD VENIPUNCTURE: CPT

## 2018-11-29 RX ORDER — GUAIFENESIN 600 MG/1
600 TABLET, EXTENDED RELEASE ORAL 2 TIMES DAILY
Qty: 60 TAB | Refills: 0 | Status: SHIPPED | OUTPATIENT
Start: 2018-11-29 | End: 2018-12-29

## 2018-11-29 RX ORDER — PANTOPRAZOLE SODIUM 40 MG/1
40 TABLET, DELAYED RELEASE ORAL
Qty: 30 TAB | Refills: 0 | Status: SHIPPED | OUTPATIENT
Start: 2018-11-30 | End: 2018-12-30

## 2018-11-29 RX ADMIN — SERTRALINE HYDROCHLORIDE 50 MG: 50 TABLET ORAL at 09:16

## 2018-11-29 RX ADMIN — VITAMIN D, TAB 1000IU (100/BT) 5000 UNITS: 25 TAB at 09:16

## 2018-11-29 RX ADMIN — FUROSEMIDE 40 MG: 40 TABLET ORAL at 11:53

## 2018-11-29 RX ADMIN — PANTOPRAZOLE SODIUM 40 MG: 40 TABLET, DELAYED RELEASE ORAL at 07:18

## 2018-11-29 RX ADMIN — LEVOTHYROXINE SODIUM 150 MCG: 150 TABLET ORAL at 07:18

## 2018-11-29 RX ADMIN — FLUTICASONE PROPIONATE 2 SPRAY: 50 SPRAY, METERED NASAL at 09:16

## 2018-11-29 RX ADMIN — Medication 10 ML: at 13:22

## 2018-11-29 RX ADMIN — INSULIN GLARGINE 20 UNITS: 100 INJECTION, SOLUTION SUBCUTANEOUS at 11:26

## 2018-11-29 NOTE — PROGRESS NOTES
Physical Therapy 11/29/2018 Orders received, chart reviewed and patient evaluated by physical therapy. Recommend patient to discharge to home with no further PT needs. Completing acute PT orders. Full evaluation to follow.   
Clinton Moncada, PT, DPT

## 2018-11-29 NOTE — PROGRESS NOTES
Problem: Patient Education: Go to Patient Education Activity Goal: Patient/Family Education Occupational Therapy EVALUATION/discharge Patient: Veronica Valladares (89 y.o. female) Date: 11/29/2018 Primary Diagnosis: Acute chest pain Mass of chest wall Acute hyperkalemia Dehydration SOB (shortness of breath) Fever Anemia Precautions:     
 
ASSESSMENT:  
Based on the objective data described below, the patient presents with increased activity tolerance and overall mod I to independent with basic ADLs and mobility, reported having low toilet at home and recommended raised toilet seat and where to purchase. Further skilled acute occupational therapy is not indicated at this time. Discharge Recommendations: None Further Equipment Recommendations for Discharge: looking into raised toilet seat with bars at [address]:  
Patient stated I think I will get it on my way home.  re; raised toilet seat OBJECTIVE DATA SUMMARY:  
HISTORY:  
Past Medical History:  
Diagnosis Date  Adverse effect of anesthesia   
 sleep apnea cpap machine useage  Anemia  Arthritis DDD low back and knees  Asthma   
 uses albuterol prn only; none x 6 mos. Never hospitalized  BRCA negative 1/2013  Calculus of kidney  Chronic kidney disease   
 kidney stones  Chronic pain   
 knee pain bilat  CINV (chemotherapy-induced nausea and vomiting) 8/21/2014  Decreased hearing, right PATIENT STATES THIS IS DUE TO CHEMOTHERAPY  Diabetes ([address] Age 46 IDDM  Environmental allergies  Fibromyalgia  GERD (gastroesophageal reflux disease)   
 controlled with med  Hx of pulmonary embolus during pregnancy 1/18/2015  Hypertension  Ill-defined condition Sepsis 9-2015 -  [facility]  
 Ill-defined condition 2016  
 chemotherapy  Mass of chest wall 05/2018  Morbid obesity ([address]  Murmur, heart  Other and unspecified hyperlipidemia  Ovarian cancer ([facility] 2012, 2014  
 serous, high grade, stage IIIc. s/p chemotx (has port)  Psychiatric disorder  Rectal bleeding  Thromboembolus ([facility]  POST PARTUM; resolved- PELVIS  Thyroid disease HYPOTHYROID  Unspecified sleep apnea CPAP, Dr. Ova Simmonds Past Surgical History:  
Procedure Laterality Date  BREAST SURGERY PROCEDURE UNLISTED Lymph node in left breast 2017  CHEST SURGERY PROCEDURE UNLISTED Placement of port a cath right chest  
 HX GYN    
  X2  
 HX HYSTERECTOMY  2012 ROULA/BSO, tumor debulking, omentectomy for ovarian cancer  HX ORTHOPAEDIC    
 ankle-FX, R  
 HX ORTHOPAEDIC    
 FX R ARM  
 HX UROLOGICAL    
 stent  HX VASCULAR ACCESS  2015  
 right chest- port placed Prior Level of Function/Environment/Context: lives alone, no falls reported, son in area, independent basic ADL's and mobility Occupations in which the patient is/was successful, what are the barriers preventing that success:  
Performance Patterns (routines, roles, habits, and rituals):  
Personal Interests and/or values:  
Expanded or extensive additional review of patient history:  
 
Home Situation Home Environment: Private residence # Steps to Enter: 6 One/Two Story Residence: Other (Comment) # of Interior Steps: 21 Interior Rails: Left Living Alone: No 
Support Systems: Family member(s), Friends \ neighbors Patient Expects to be Discharged to[de-identified] Private residence Current DME Used/Available at Home: [employer], rollator, [employer], straight Tub or Shower Type: Shower Hand dominance: RightEXAMINATION OF PERFORMANCE DEFICITS: 
Cognitive/Behavioral Status: 
Neurologic State: Alert Orientation Level: Oriented X4 Cognition: Follows commands; Appropriate safety awareness; Appropriate for age attention/concentration; Appropriate decision making Perception: Appears intact Perseveration: No perseveration noted Safety/Judgement: Awareness of environment; Fall prevention;Home safety; Insight into deficits Skin: intact Edema: none noted Hearing: Auditory Auditory Impairment: None Vision/Perceptual:   
    
    
    
  
    
    
Corrective Lenses: Glasses Range of Motion: 
 
AROM: Within functional limits PROM: Within functional limits Strength: 
Strength: Within functional limits Coordination: 
Coordination: Within functional limits Fine Motor Skills-Upper: Left Intact; Right Intact Gross Motor Skills-Upper: Left Intact; Right Intact Tone & Sensation: 
 
Tone: Normal 
Sensation: Intact(UEs) Balance: 
Sitting: Intact; Without support Standing: Intact Functional Mobility and Transfers for ADLs:Bed Mobility: 
Rolling: Modified independent Supine to Sit: Modified independent Sit to Supine: Modified independent Scooting: Independent Transfers: 
Sit to Stand: Independent Stand to Sit: Independent Bed to Chair: Modified independent Bathroom Mobility: Independent Toilet Transfer : Modified independent ADL Assessment: 
Feeding: Independent Oral Facial Hygiene/Grooming: Independent Bathing: Independent Upper Body Dressing: Independent Lower Body Dressing: Modified independent Toileting: Modified independent ADL Intervention and task modifications: 
 educated patient on role of OT, fall prevention, safe footwear, energy conservation, potential equipment needs and benefits, reports having low toilet at home and inability to get up consistently, instructed on use of bedside commode over toilet and use of raised toilet seat with rails, instructed on where to purchase, showed picture of equipment and instructed where to purchase Cognitive Retraining Safety/Judgement: Awareness of environment; Fall prevention;Home safety; Insight into deficits Functional Measure: 
Barthel Index: 
 
Bathin Bladder: 10 Bowels: 10 
 Groomin Dressing: 10 Feeding: 10 Mobility: 15 
Stairs: 10 Toilet Use: 10 Transfer (Bed to Chair and Back): 15 Total: 100 Barthel and G-code impairment scale: 
Percentage of impairment CH 
0% CI 
1-19% CJ 
20-39% CK 
40-59% CL 
60-79% CM 
80-99% CN 
100% Barthel Score 0-100 100 99-80 79-60 59-40 20-39 1-19 
 0 Barthel Score 0-20 20 17-19 13-16 9-12 5-8 1-4 0 The Barthel ADL Index: Guidelines 1. The index should be used as a record of what a patient does, not as a record of what a patient could do. 2. The main aim is to establish degree of independence from any help, physical or verbal, however minor and for whatever reason. 3. The need for supervision renders the patient not independent. 4. A patient's performance should be established using the best available evidence. Asking the patient, friends/relatives and nurses are the usual sources, but direct observation and common sense are also important. However direct testing is not needed. 5. Usually the patient's performance over the preceding 24-48 hours is important, but occasionally longer periods will be relevant. 6. Middle categories imply that the patient supplies over 50 per cent of the effort. 7. Use of aids to be independent is allowed. Branden Zapata., Barthel, D.W. ([id number]). Functional evaluation: the Barthel Index. [address] [facility] (14)2. Jagdeep Jackson courtney JAMES Aguilera, Ra Hollis., Kelly Rosario., [address] (). Measuring the change indisability after inpatient rehabilitation; comparison of the responsiveness of the Barthel Index and Functional [place] Measure. Journal of Neurology, Neurosurgery, and Psychiatry, 66(4), 457-486. Nicky Srivastava, N.J.A, JEYSON KimJ.KEVIN, & Mark North M.A. (2004.) Assessment of post-stroke quality of life in cost-effectiveness studies: The usefulness of the Barthel Index and the EuroQoL-5D. Replaced by [facility] of [facility], 13, 681-21 G codes: In compliance with CMSs Claims Based Outcome Reporting, the following G-code set was chosen for this patient based on their primary functional limitation being treated: The outcome measure chosen to determine the severity of the functional limitation was the Barthel Index with a score of 100/100 which was correlated with the impairment scale. ? Self Care:  
  - CURRENT STATUS: CH - 0% impaired, limited or restricted  - GOAL STATUS: CH - 0% impaired, limited or restricted  - D/C STATUS:  CH - 0% impaired, limited or restricted Occupational Therapy Evaluation Charge Determination History Examination Decision-Making LOW Complexity : Brief history review  LOW Complexity : 1-3 performance deficits relating to physical, cognitive , or psychosocial skils that result in activity limitations and / or participation restrictions  LOW Complexity : No comorbidities that affect functional and no verbal or physical assistance needed to complete eval tasks Based on the above components, the patient evaluation is determined to be of the following complexity level: LOW Pain: 
 no complaint of pain Activity Tolerance:  
Good to fair Please refer to the flowsheet for vital signs taken during this treatment. After treatment:  
[]  Patient left in no apparent distress sitting up in chair 
[x]  Patient left in no apparent distress in bed 
[x]  Call bell left within reach [x]  Nursing notified 
[]  Caregiver present 
[]  Bed alarm activated COMMUNICATION/EDUCATION:  
Communication/Collaboration: 
[x]      Home safety education was provided and the patient/caregiver indicated understanding. [x]      Patient/family have participated as able and agree with findings and recommendations. []      Patient is unable to participate in plan of care at this time. Findings and recommendations were discussed with: Registered Nurse ALFIE Seaman/L Time Calculation: 24 mins

## 2018-11-29 NOTE — PROGRESS NOTES
DISCHARGE - Pt is pleased to be discharged today. Reviewed all discharge information with pt including diet, medications, activity and follow-up information. Scripts called to [facility] on [address]. IV access removed and tele-monitor off. Pt taken via wheelchair to Patient Discharge area where family was waiting.

## 2018-11-29 NOTE — PROGRESS NOTES
physical Therapy EVALUATION/DISCHARGE Patient: Keiko Montes (17 y.o. female) Date: 11/29/2018 Primary Diagnosis: Acute chest pain Mass of chest wall Acute hyperkalemia Dehydration SOB (shortness of breath) Fever Anemia Precautions:  
ASSESSMENT : 
Based on the objective data described below, the patient presents with at-baseline functional mobility following admission for SOB and right chest wall mass. Reviewed functional compensatory strategies to limit fatigue, especially following chemotherapy. Patient performed bed mobility, functional transfers, and ambulation in an open environment/with distractions with independence and no overt LOB. Patient reports no concerns for home discharge. Completing acute PT orders at this time. Skilled physical therapy is not indicated at this time. PLAN : 
Discharge Recommendations: None Further Equipment Recommendations for Discharge: None SUBJECTIVE:  
Patient stated I have 4 sons and they all live with me.  OBJECTIVE DATA SUMMARY:  
HISTORY:   
Past Medical History:  
Diagnosis Date  Adverse effect of anesthesia   
 sleep apnea cpap machine useage  Anemia  Arthritis DDD low back and knees  Asthma   
 uses albuterol prn only; none x 6 mos. Never hospitalized  BRCA negative 1/2013  Calculus of kidney  Chronic kidney disease   
 kidney stones  Chronic pain   
 knee pain bilat  CINV (chemotherapy-induced nausea and vomiting) 8/21/2014  Decreased hearing, right PATIENT STATES THIS IS DUE TO CHEMOTHERAPY  Diabetes ([address] Age 46 IDDM  Environmental allergies  Fibromyalgia  GERD (gastroesophageal reflux disease)   
 controlled with med  Hx of pulmonary embolus during pregnancy 1/18/2015  Hypertension  Ill-defined condition Sepsis 9-2015 -  [facility]  
 Ill-defined condition 2016  
 chemotherapy  Mass of chest wall 05/2018  Morbid obesity ([address]  Murmur, heart  Other and unspecified hyperlipidemia  O[facility] cancer ([facility] [address] 2012, 2014  
 serous, high grade, stage IIIc. s/p chemotx (has port)  Psychiatric disorder  Rectal bleeding  Thromboembolus ([facility] [address]  POST PARTUM; resolved- PELVIS  Thyroid disease HYPOTHYROID  Unspecified sleep apnea CPAP, Dr. Bruce Johnson Past Surgical History:  
Procedure Laterality Date  BREAST SURGERY PROCEDURE UNLISTED Lymph node in left breast 2017  CHEST SURGERY PROCEDURE UNLISTED Placement of port a cath right chest  
 HX GYN    
  X2  
 HX HYSTERECTOMY  2012 ROULA/BSO, tumor debulking, omentectomy for ovarian cancer  HX ORTHOPAEDIC    
 ankle-FX, R  
 HX ORTHOPAEDIC    
 FX R ARM  
 HX UROLOGICAL    
 stent  HX VASCULAR ACCESS  2015  
 right chest- port placed Prior Level of Function/Home Situation: Functionally independent. Lives with her 4 sons. Will utilize a rollator for community distance. Personal factors and/or comorbidities impacting plan of care:  
 
Home Situation Home Environment: Private residence # Steps to Enter: 6 One/Two Story Residence: Other (Comment) # of Interior Steps: 21 Interior Rails: Left Living Alone: No 
Support Systems: Family member(s), Friends \ neighbors Patient Expects to be Discharged to[de-identified] Private residence Current DME Used/Available at Home: [address], rollator, [employer], straight, [employer], quad Tub or Shower Type: Shower EXAMINATION/PRESENTATION/DECISION MAKING:  
Critical Behavior: 
Neurologic State: Alert Orientation Level: Oriented X4 Cognition: Follows commands, Appropriate safety awareness, Appropriate for age attention/concentration, Appropriate decision making Safety/Judgement: Awareness of environment, Fall prevention, Home safety, Insight into deficits Hearing: Auditory Auditory Impairment: None Range Of Motion: 
AROM: Within functional limits PROM: Within functional limits Strength:   
 Strength: Within functional limits Tone & Sensation:  
Tone: Normal 
Sensation: Intact Coordination: 
Coordination: Within functional limits Vision:  
Corrective Lenses: Glasses Functional Mobility: 
Bed Mobility: 
Rolling: Modified independent Supine to Sit: Modified independent Sit to Supine: Modified independent Scooting: Independent Transfers: 
Sit to Stand: Independent Stand to Sit: Independent Bed to Chair: Modified independent Balance:  
Sitting: Intact Standing: Intact Ambulation/Gait Training: 
Distance (ft): 300 Feet (ft) Ambulation - Level of Assistance: Independent Gait Description (WDL): Exceptions to [facility] Gait Abnormalities: Decreased step clearance Base of Support: Narrowed Functional Measure: 
Tinetti test: 
 
Sitting Balance: 1 Arises: 1 Attempts to Rise: 2 Immediate Standing Balance: 2 Standing Balance: 1 Nudged: 2 Eyes Closed: 1 Turn 360 Degrees - Continuous/Discontinuous: 1 Turn 360 Degrees - Steady/Unsteady: 1 Sitting Down: 1 Balance Score: 13 Indication of Gait: 1 
R Step Length/Height: 1 L Step Length/Height: 1 
R Foot Clearance: 1 L Foot Clearance: 1 Step Symmetry: 1 Step Continuity: 1 Path: 2 Trunk: 2 Walking Time: 1 Gait Score: 12 Total Score: 25 Tinetti Test and G-code impairment scale: 
Percentage of Impairment CH 
 
0% 
 CI 
 
1-19% CJ 
 
20-39% CK 
 
40-59% CL 
 
60-79% CM 
 
80-99% CN  
 
100% Tinetti Score 0-28 28 23-27 17-22 12-16 6-11 1-5 0 Tinetti Tool Score Risk of Falls 
<19 = High Fall Risk 19-24 = Moderate Fall Risk 25-28 = Low Fall Risk Tinetti ME. Performance-Oriented Assessment of Mobility Problems in Elderly Patients. [place] [address]; [id number]. (Scoring Description: PT Bulletin Feb. 10, 1993) Older adults: Kala Cordoba et al, 2009; n = [address] elderly evaluated with ABC, WALTER, ADL, and IADL) · Mean WALTER score for males aged 69-68 years = 26.21(3.40) · Mean WALTER score for females age 69-68 years = 25.16(4.30) · Mean WALTER score for males over 80 years = 23.29(6.02) · Mean WALTER score for females over 80 years = 17.20(8.32) G codes: In compliance with CMSs Claims Based Outcome Reporting, the following G-code set was chosen for this patient based on their primary functional limitation being treated: The outcome measure chosen to determine the severity of the functional limitation was the TUG with a score of 25/28 which was correlated with the impairment scale. ? Mobility - Walking and Moving Around:  
  - CURRENT STATUS: CI - 1%-19% impaired, limited or restricted  - GOAL STATUS: CI - 1%-19% impaired, limited or restricted  - D/C STATUS:  CI - 1%-19% impaired, limited or restricted Physical Therapy Evaluation Charge Determination History Examination Presentation Decision-Making HIGH Complexity :3+ comorbidities / personal factors will impact the outcome/ POC  LOW Complexity : 1-2 Standardized tests and measures addressing body structure, function, activity limitation and / or participation in recreation  LOW Complexity : Stable, uncomplicated  Other outcome measures TUG  LOW Based on the above components, the patient evaluation is determined to be of the following complexity level: LOW Pain: 
Pain Scale 1: Numeric (0 - 10) Pain Intensity 1: 0 Activity Tolerance: 
Please refer to the flowsheet for vital signs taken during this treatment. After treatment:  
[]   Patient left in no apparent distress sitting up in chair 
[x]   Patient left in no apparent distress in bed 
[x]   Call bell left within reach [x]   Nursing notified 
[]   Caregiver present 
[]   Bed alarm activated COMMUNICATION/EDUCATION:  
Communication/Collaboration: 
[x]   Fall prevention education was provided and the patient/caregiver indicated understanding. [x]   Patient/family have participated as able and agree with findings and recommendations. []   Patient is unable to participate in plan of care at this time. Findings and recommendations were discussed with: Registered Nurse Thank you for this referral. 
Quintin Harrison, PT, DPT Time Calculation: 20 mins

## 2018-11-29 NOTE — DISCHARGE SUMMARY
Discharge Summary PATIENT ID: Alfred MooreJacquie MRN: [id number] YOB: 1960 DATE OF ADMISSION: 11/26/2018  7:23 PM   
DATE OF DISCHARGE: 11/29/2018 PRIMARY CARE PROVIDER: Bernard Yeung MD  
 
ATTENDING PHYSICIAN: Tisha Reynolds MD 
DISCHARGING PROVIDER: Tisha Reynolds MD   
To contact this individual call [phone] and ask the  to page. If unavailable ask to be transferred the Adult Hospitalist Department. CONSULTATIONS: IP CONSULT TO HOSPITALIST 
IP CONSULT TO GYNECOLOGICAL ONCOLOGY PROCEDURES/SURGERIES: * No surgery found * [id number] [facility] COURSE:  
Admitted with chest pain, work up ruled out cardiac source, likely pain related to metastatic Ovarian cancer. Will need close follow up with Oncology/ suggest palliative care follow up too. Risk of Re-Admission: high DISCHARGE DIAGNOSES / PLAN:   
 
#. Chest pain: likely related to cancer. Has chronic pain 
- usually controlled with her pain regimen, recently not controlled, may require more often dose 
- EKG normal sinus on admission. Troponin levels wnl, no hx of CAD 
- Analgesia prn, reviewed by Oncology. Will need follow up with them and with PCP #. Dehydration: resolved. #. FELICIA: 2nd to above, improving, monitor #. Hyperkalemia: resolved. #. Fatigue: related to Ca, PT/OT evaluated, deemed independent #. Dry cough and wheeze: Ca related, CTA chest -ve for acute findings except for metastatic cancer and mass on right chest wall area. Symptomatic Mg. FOLLOW UP APPOINTMENTS:   
Follow-up Information Follow up With Specialties Details Why Contact Info Bernard Yeung MD Internal Medicine   [address] [address] [place] [address] 
[phone] ADDITIONAL CARE RECOMMENDATIONS: f/u with oncology, PCP and preferrably palliative care too. DIET: Resume previous diet ACTIVITY: Activity as tolerated DISCHARGE MEDICATIONS: 
 Current Discharge Medication List  
  
START taking these medications Details  
pantoprazole (PROTONIX) 40 mg tablet Take 1 Tab by mouth Daily (before breakfast) for 30 days. Qty: 30 Tab, Refills: 0  
  
guaiFENesin ER (MUCINEX) 600 mg ER tablet Take 1 Tab by mouth two (2) times a day for 30 days. Qty: 60 Tab, Refills: 0 CONTINUE these medications which have NOT CHANGED Details  
azelastine (ASTELIN) 137 mcg (0.1 %) nasal spray 1 [facility] by Both Nostrils route daily. Use in each nostril as directed   
  
insulin detemir U-100 (LEVEMIR) 100 unit/mL injection 20 Units by SubCUTAneous route daily (with lunch). linaclotide (LINZESS) 145 mcg cap capsule Take 145 mcg by mouth daily as needed. montelukast (SINGULAIR) 10 mg tablet Take 10 mg by mouth nightly. oxyCODONE-acetaminophen (PERCOCET) 5-325 mg per tablet Take 1 Tab by mouth as needed for Pain (When receiving chemotherapy). ondansetron (ZOFRAN ODT) 8 mg disintegrating tablet Take 8 mg by mouth as needed for Nausea (When receiving chemotherapy). ascorbic acid, vitamin C, (VITAMIN C) 1,000 mg tablet Take 1,000 mg by mouth daily. albuterol (PROVENTIL VENTOLIN) 2.5 mg /3 mL (0.083 %) nebulizer solution 2.5 mg by Nebulization route every four (4) hours as needed for Wheezing. potassium chloride (KLOR-CON M10) 10 mEq tablet TAKE 1 TAB BY MOUTH TWO (2) DAYS A WEEK. WED AND FRI Qty: 90 Tab, Refills: 1 Associated Diagnoses: Edema, unspecified type  
  
telmisartan (MICARDIS) 20 mg tablet Take 1 Tab by mouth daily. Qty: 90 Tab, Refills: 3  
  
insulin aspart U-100 (NOVOLOG FLEXPEN U-100 INSULIN) 100 unit/mL inpn INJECT 20 UNITS BEFORE EACH MEAL + 4 UNITS FOR EVERY 50 MG/DL ABOVE 150 MG/DL.  UNITS PER DAY Qty: 30 mL, Refills: 11  
 Associated Diagnoses: Type 2 diabetes mellitus with hyperglycemia, with long-term current use of insulin ([facility]) omega-3 acid ethyl esters (LOVAZA) 1 gram capsule TAKE ONE CAPSULE BY MOUTH TWICE A DAY Qty: 180 Cap, Refills: 2 Dexlansoprazole (DEXILANT) 60 mg CpDB Take 1 Cap by mouth daily. Qty: 90 Cap, Refills: 3 Associated Diagnoses: Gastroesophageal reflux disease, esophagitis presence not specified  
  
lidocaine-prilocaine (EMLA) topical cream Apply small amount over port area one hour before chemo treatment and cover with a Band-Aid 
Qty: 30 g, Refills: 1  
  
levothyroxine (SYNTHROID) 150 mcg tablet TAKE 1 TAB BY MOUTH DAILY (BEFORE BREAKFAST). --[facility] [phone] FOR APPOINTMENT FOR MORE REFILLS Qty: 90 Tab, Refills: 0 Associated Diagnoses: Acquired hypothyroidism Liraglutide (VICTOZA) 0.6 mg/0.1 mL (18 mg/3 mL) pnij 1.8 mg by SubCUTAneous route daily (with lunch). Qty: 3 Pen, Refills: 3 Associated Diagnoses: Type 2 diabetes mellitus with hyperglycemia, with long-term current use of insulin ([address] SYMBICORT 160-4.5 mcg/actuation HFAA Take 2 Puffs by inhalation two (2) times daily as needed. Qty: 2 Inhaler, Refills: 3  
  
clonazePAM (KLONOPIN) 0.5 mg tablet Take 0.5 mg by mouth nightly as needed. sertraline (ZOLOFT) 50 mg tablet TAKE 1 TABLET BY MOUTH EVERY MORNING Refills: 1 cholecalciferol, VITAMIN D3, (VITAMIN D3) 5,000 unit tab tablet Take 5,000 Units by mouth daily. furosemide (LASIX) 40 mg tablet Take 40 mg by mouth every other day. Refills: 3 STOP taking these medications  
  
 fluticasone (FLONASE) 50 mcg/actuation nasal spray Comments:  
Reason for Stopping:   
   
  
 
 
NOTIFY YOUR PHYSICIAN FOR ANY OF THE FOLLOWING:  
Fever over 101 degrees for 24 hours. Chest pain, shortness of breath, fever, chills, nausea, vomiting, diarrhea, change in mentation, falling, weakness, bleeding. Severe pain or pain not relieved by medications. Or, any other signs or symptoms that you may have questions about. DISPOSITION: 
  Home With: 
 OT  PT  HH  RN  
  
 Long term SNF/Inpatient Rehab  
x Independent/assisted living Hospice Other: PATIENT CONDITION AT DISCHARGE:  
 
Functional status Poor Deconditioned Independent Cognition Jennifer Kurtz Forgetful Dementia Catheters/lines (plus indication) [place] PICC   
 PEG None Code status Full code DNR   
 
PHYSICAL EXAMINATION AT DISCHARGE: 
 General:  Alert, oriented, No acute distress Card:  S1, S2 without murmurs, good peripheral perfusion, No peripheral edema Resp:  No accessory muscle use, clear breath sounds with wheezes mostly on right side. No rhonchi. Right chest wall mass 10cm size on R SCJ area Abd:  Soft, non-tender, non-distended, BS+, Morbidly obese Extremities:  No cyanosis or clubbing, no significant edema Neuro:  Grossly normal, no focal neuro deficits, follows commands Psych:  Good insight, AAOx3 CHRONIC MEDICAL DIAGNOSES: 
Problem List as of 11/29/2018 Date Reviewed: 9/26/2018 Codes Class Noted - Resolved Dehydration ICD-10-CM: E86.0 ICD-9-CM: 276.51  11/27/2018 - Present Acute hyperkalemia ICD-10-CM: E87.5 ICD-9-CM: 276.7  11/27/2018 - Present * (Principal) Acute chest pain ICD-10-CM: R07.9 ICD-9-CM: 786.50  11/27/2018 - Present Fever ICD-10-CM: R50.9 ICD-9-CM: 780.60  11/27/2018 - Present Mass of chest wall ICD-10-CM: R22.2 ICD-9-CM: 786.6  11/27/2018 - Present Thrombocytopenia ([address] ICD-10-CM: D69.6 ICD-9-CM: 287.5  11/27/2018 - Present Moderate asthma with acute exacerbation ICD-10-CM: J45.901 ICD-9-CM: 493.92  11/27/2018 - Present Cracked tooth ICD-10-CM: K04.04 ICD-9-CM: 521.81  7/31/2018 - Present Hypertension ICD-10-CM: I10 
ICD-9-CM: 401.9  7/18/2018 - Present Pain due to malignant neoplasm metastatic to bone [facility]) ICD-10-CM: G89.3, C79.51 
ICD-9-CM: 338.3  7/18/2018 - Present Overview Signed 7/18/2018  1:46 PM by MALLIKA Kc Mediastinal chest wall mass Diabetes ([facility] ICD-10-CM: E11.9 ICD-9-CM: 250.00  7/17/2018 - Present SOB (shortness of breath) ICD-10-CM: R06.02 
ICD-9-CM: 786.05  7/17/2018 - Present Hypothyroidism ICD-10-CM: E03.9 ICD-9-CM: 244.9  7/17/2018 - Present Anemia ICD-10-CM: D64.9 ICD-9-CM: 285.9  7/17/2018 - Present Contrast media allergy ICD-10-CM: Z91.041 ICD-9-CM: V15.08  7/9/2018 - Present On antineoplastic chemotherapy ICD-10-CM: Z79.899 ICD-9-CM: V58.69  5/31/2018 - Present Reactive depression ICD-10-CM: F32.9 ICD-9-CM: 300.4  5/31/2018 - Present Chest mass ICD-10-CM: R22.2 ICD-9-CM: 786.6  4/10/2018 - Present Chest wall mass ICD-10-CM: R22.2 ICD-9-CM: 786.6  4/10/2018 - Present Type 2 diabetes mellitus with nephropathy ([facility] ICD-10-CM: E11.21 
ICD-9-CM: 250.40, 583.81  12/26/2017 - Present Lymphadenopathy, mediastinal ICD-10-CM: R59.0 ICD-9-CM: 785.6  7/18/2017 - Present Pulmonary hypertension, mild (HCC) ICD-10-CM: I27.20 ICD-9-CM: 416.8  3/28/2017 - Present Renal calculi ICD-10-CM: N20.0 ICD-9-CM: 592.0  3/2/2017 - Present Hyperglycemia due to type 2 diabetes mellitus ([facility] ICD-10-CM: E11.65 ICD-9-CM: 250.00  2/15/2017 - Present Ovarian cancer ([facility] ICD-10-CM: C56.9 ICD-9-CM: 183.0  4/28/2015 - Present Portacath in place--left ICD-10-CM: Z95.828 ICD-9-CM: V45.89  1/18/2015 - Present Chest pain, atypical ICD-10-CM: R07.89 ICD-9-CM: 786.59  1/18/2015 - Present CINV (chemotherapy-induced nausea and vomiting) ICD-10-CM: R11.2, T45.1X5A 
ICD-9-CM: 787.01, E933.1  8/21/2014 - Present Dyslipidemia (high LDL; low HDL) ICD-10-CM: E78.5 ICD-9-CM: 272.4  Unknown - Present Benign essential hypertension ICD-10-CM: I10 
ICD-9-CM: 401.1  4/4/2014 - Present SHABANA on CPAP ICD-10-CM: G47.33, Z99.89 ICD-9-CM: 327.23, V46.8  2/28/2014 - Present Abnormal mammogram ICD-10-CM: R92.8 ICD-9-CM: 793.80  1/15/2014 - Present Carcinomatosis ([facility] ICD-10-CM: C80.0 ICD-9-CM: 199.0  9/22/2012 - Present Controlled type 2 diabetes mellitus without complication, with long-term current use of insulin (HCC) ICD-10-CM: E11.9, Z79.4 ICD-9-CM: 250.00, V58.67  9/22/2012 - Present Morbid obesity ([facility] ICD-10-CM: E66.01 
ICD-9-CM: 278.01  9/22/2012 - Present RESOLVED: Wheezing ICD-10-CM: R06.2 ICD-9-CM: 786.07  7/18/2017 - 5/31/2018 RESOLVED: UTI (urinary tract infection) ICD-10-CM: N39.0 ICD-9-CM: 599.0  4/3/2017 - 5/31/2018 RESOLVED: ARF (acute renal failure) (HCC) ICD-10-CM: N17.9 ICD-9-CM: 584.9  3/2/2017 - 5/31/2018 RESOLVED: Elevated BUN ICD-10-CM: R79.9 ICD-9-CM: 790.6  3/2/2017 - 7/17/2018 RESOLVED: Angina pectoris associated with type 2 diabetes mellitus ([facility] ICD-10-CM: E11.59, I20.9 ICD-9-CM: 250.80, 413.9  2/15/2017 - 7/31/2018 RESOLVED: Swelling of eye, bilateral--left > right ICD-10-CM: H57.89 ICD-9-CM: 379.92  1/18/2015 - 6/28/2018 RESOLVED: DVT femoral (deep venous thrombosis) with thrombophlebitis--left  ICD-10-CM: I82.419 ICD-9-CM: 451.11  1/18/2015 - 4/14/2015  
   
  
 
 
37 minutes were spent with the patient on counseling and coordination of care Signed:  
Michaela Mcintyre MD 
11/29/2018 12:54 PM

## 2018-11-29 NOTE — PROGRESS NOTES
Problem: Hypertension Goal: *Blood pressure within specified parameters Outcome: Progressing Towards Goal 
Continue to monitor vital signs every four hours.

## 2018-11-29 NOTE — DISCHARGE INSTRUCTIONS
Discharge Instructions       PATIENT ID: Keiko Montes  MRN: [id number]   YOB: 1960    DATE OF ADMISSION: 11/26/2018  7:23 PM    DATE OF DISCHARGE: 11/29/2018    PRIMARY CARE PROVIDER: Radha Daley MD     ATTENDING PHYSICIAN: Theo Quesada MD  DISCHARGING PROVIDER: Kae Virk MD    To contact this individual call [phone] and ask the  to page. If unavailable ask to be transferred the Adult Hospitalist Department. DISCHARGE DIAGNOSES  Chest wall pain due to metastatic ovarian Ca    CONSULTATIONS: IP CONSULT TO HOSPITALIST  IP CONSULT TO GYNECOLOGICAL ONCOLOGY    PROCEDURES/SURGERIES: * No surgery found *    FOLLOW UP APPOINTMENTS:   Follow-up Information     Follow up With Specialties Details Why Contact Martine Turner MD Internal Medicine   [address]  [facility] [address]  [facility]  [phone]             ADDITIONAL CARE RECOMMENDATIONS:  Follow up with Gyn/Onc and PCP    DIET: Resume previous diet    DISCHARGE MEDICATIONS:   Guaifenesin    · It is important that you take the medication exactly as they are prescribed. · Keep your medication in the bottles provided by the pharmacist and keep a list of the medication names, dosages, and times to be taken in your wallet. · Do not take other medications without consulting your doctor. NOTIFY YOUR PHYSICIAN FOR ANY OF THE FOLLOWING:   Fever over 101 degrees for 24 hours. Chest pain, shortness of breath, fever, chills, nausea, vomiting, diarrhea, change in mentation, falling, weakness, bleeding. Severe pain or pain not relieved by medications. Or, any other signs or symptoms that you may have questions about. It was our pleasure to help take care of you and we hope you get well very soon.     DISPOSITION:    Home With:   OT  PT  HH  RN       SNF/Inpatient Rehab/LTAC   x Independent/assisted living    Hospice    Other:     CDMP Checked:   Yes x     PROBLEM LIST Updated:  Yes x     Signed:   Kae Virk MD  11/29/2018  12:52 PM

## 2018-11-29 NOTE — PROGRESS NOTES
Spiritual Care Partner Volunteer visited patient in room 356 on 11.29.18. Documented by: : Rev. Trinidad Galeano. Nithya Mack; [facility], to contact [phone] call: [phone]

## 2018-11-30 ENCOUNTER — PATIENT OUTREACH (OUTPATIENT)
Dept: INTERNAL MEDICINE CLINIC | Age: 58
End: 2018-11-30

## 2018-11-30 NOTE — PROGRESS NOTES
Hospital Discharge Follow-Up Date/Time:  2018 10:53 AM 
Routine JOSIE Patient was admitted to [address] on 18 and discharged on 18 for c/o chest pain. The physician discharge summary was available at the time of outreach. Patient was contacted within 1 business days of discharge. Top Challenges reviewed with the provider - Could benefit from Outpatient Palliative Medicine Referral; has f/u scheduled with Hem/Onc  Method of communication with provider :chart routing Inpatient RRAT score: 27 Was this a readmission? no  
Patient stated reason for the readmission: n/a Nurse Navigator (NN) contacted the patient by telephone to perform post hospital discharge assessment. Verified name and  with patient as identifiers. Provided introduction to self, and explanation of the Nurse Navigator role. Reviewed discharge instructions and red flags with patient who verbalized understanding. Patient given an opportunity to ask questions and does not have any further questions or concerns at this time. The patient agrees to contact the PCP office for questions related to their healthcare. NN provided contact information for future reference. Disease Specific:   N/A Summary of patient's top problems: 1. Metastatic Ovarian Cancer: followed by Oscar Perez; most recent office visit 18 to f/u in 4 weeks and reportedly did not follow-up as recommended due to she did not like the chemotherapy side effects. Chest CT this admission shows slight progression of disease; patient to f/u with Gyn Onc within one week post-discharge for family meeting to discuss plan of care. 2. Chest Pain: likely related to metastatic ovarian cancer; cardiac work-up negative.  Followed by Dr. Pavan Malagon for Cardiology with most recent office visit 17 and advised to f/u in 6 months; overdue for f/u. 
3. FELICIA secondary to dehydration: Cr improved at discharge to 1.12 (H); admission result of 1.51 (H). Stage 3 CKD. 4. Hyperkalemia: potassium on admission 5.9 (H); improved at discharge to 4.8 (WNL). 5. Dry cough and wheeze: Cancer related, CTA chest negative for acute findings except for metastatic cancer and mass on right chest wall area. Symptomatic Management; discharged on mucinex. Home Health orders at discharge: none [address]: n/a Date of initial visit: n/a Durable Medical Equipment ordered/company: none Durable Medical Equipment received: n/a Barriers to care? none reported Advance Care Planning:  
Does patient have an Advance Directive:  reviewed and needs to be updated ; primary agent is . Medication(s):  
New Medications at Discharge: protonix, mucinex Changed Medications at Discharge: none Discontinued Medications at Discharge: flonase Unable to complete medication reconciliation with patient today due to patient is currently at work; patient confirms that she is currently taking protonix and mucinex as ordered at hospital discharge. [facility] follow up appointment(s): No future appointments. Non-[facility] follow up appointment(s): n/a [facility]:  n/a  
 
 
Goals Post Hospitalization  Prevent complications post hospitalization. 2018  
- currently taking OTC tylenol for pain management. Needs refill of pain medication from Oncologist and plans to request this at upcoming appointment next week; encouraged patient outreach to Oncologist this morning to request refill so that, if ready, she could  prescription on her way home from work this afternoon 
-  Scheduled for [facility] appointment with PCP for ; declined appt that was offered for 12/3 due to her work schedule 
- has left subclavian portacath 
- has appointment scheduled with Oncologist  at 3:30 pm 
- NN outreach today is brief due to patient is currently at work

## 2018-11-30 NOTE — ACP (ADVANCE CARE PLANNING)
Advance Care Planning:  
Does patient have an Advance Directive:  reviewed and needs to be updated ; primary agent is .

## 2018-12-01 LAB
BACTERIA SPEC CULT: NORMAL
SERVICE CMNT-IMP: NORMAL

## 2018-12-04 ENCOUNTER — OFFICE VISIT (OUTPATIENT)
Dept: GYNECOLOGY | Age: 58
End: 2018-12-04

## 2018-12-04 ENCOUNTER — OFFICE VISIT (OUTPATIENT)
Dept: INTERNAL MEDICINE CLINIC | Age: 58
End: 2018-12-04

## 2018-12-04 VITALS
RESPIRATION RATE: 16 BRPM | DIASTOLIC BLOOD PRESSURE: 70 MMHG | TEMPERATURE: 97.4 F | OXYGEN SATURATION: 95 % | SYSTOLIC BLOOD PRESSURE: 147 MMHG | BODY MASS INDEX: 43.4 KG/M2 | HEART RATE: 74 BPM | HEIGHT: 69 IN | WEIGHT: 293 LBS

## 2018-12-04 VITALS
HEART RATE: 80 BPM | SYSTOLIC BLOOD PRESSURE: 140 MMHG | DIASTOLIC BLOOD PRESSURE: 76 MMHG | BODY MASS INDEX: 43.4 KG/M2 | WEIGHT: 293 LBS | HEIGHT: 69 IN

## 2018-12-04 DIAGNOSIS — C56.9 MALIGNANT NEOPLASM OF OVARY, UNSPECIFIED LATERALITY (HCC): Primary | ICD-10-CM

## 2018-12-04 DIAGNOSIS — E66.01 MORBID OBESITY (HCC): ICD-10-CM

## 2018-12-04 DIAGNOSIS — N17.9 AKI (ACUTE KIDNEY INJURY) (HCC): Primary | ICD-10-CM

## 2018-12-04 DIAGNOSIS — R22.2 MASS OF CHEST WALL: ICD-10-CM

## 2018-12-04 DIAGNOSIS — R59.0 LYMPHADENOPATHY, MEDIASTINAL: ICD-10-CM

## 2018-12-04 DIAGNOSIS — C80.0 CARCINOMATOSIS (HCC): ICD-10-CM

## 2018-12-04 DIAGNOSIS — G89.3 PAIN DUE TO MALIGNANT NEOPLASM METASTATIC TO BONE (HCC): ICD-10-CM

## 2018-12-04 DIAGNOSIS — R07.9 CHEST PAIN, UNSPECIFIED TYPE: ICD-10-CM

## 2018-12-04 DIAGNOSIS — C79.51 PAIN DUE TO MALIGNANT NEOPLASM METASTATIC TO BONE (HCC): ICD-10-CM

## 2018-12-04 NOTE — PROGRESS NOTES
1. Have you been to the ER, urgent care clinic since your last visit? Hospitalized since your last visit? [facility]  2. Have you seen or consulted any other health care providers outside of the [address] [place] [address] since your last visit? Include any pap smears or colon screening.  no

## 2018-12-04 NOTE — PATIENT INSTRUCTIONS
[employer] Activation    Thank you for requesting access to [employer]. Please follow the instructions below to securely access and download your online medical record. [employer] allows you to send messages to your doctor, view your test results, renew your prescriptions, schedule appointments, and more. How Do I Sign Up? 1. In your internet browser, go to [email or web address]. 2. Click on the First Time User? Click Here link in the Sign In box. You will see the New Member Sign Up page. 3. Enter your [employer] Access Code exactly as it appears below. You will not need to use this code after youve completed the sign-up process. If you do not sign up before the expiration date, you must request a new code. [employer] Access Code: [email or web address]  Expires: 3/4/2019  3:38 PM (This is the date your [employer] access code will )    4. Enter the last four digits of your Social Security Number (xxxx) and Date of Birth (mm/dd/yyyy) as indicated and click Submit. You will be taken to the next sign-up page. 5. Create a [employer] ID. This will be your [employer] login ID and cannot be changed, so think of one that is secure and easy to remember. 6. Create a [employer] password. You can change your password at any time. 7. Enter your Password Reset Question and Answer. This can be used at a later time if you forget your password. 8. Enter your e-mail address. You will receive e-mail notification when new information is available in [address]. 9. Click Sign Up. You can now view and download portions of your medical record. 10. Click the Download Summary menu link to download a portable copy of your medical information. Additional Information    If you have questions, please visit the Frequently Asked Questions section of the [employer] website at [email or web address] Remember, [employer] is NOT to be used for urgent needs. For medical emergencies, dial 911.

## 2018-12-04 NOTE — PROGRESS NOTES
27 [facility] [address], [address]  P [phone]  F [phone]    Office Visit    Patient ID:  Name: Elijio Harada  MRN: [id number]  : 1960/58 y.o. Visit date: 2018    HPI  Elijio Harada is a 63 y.o.  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 The patient's previous treatment procedures include primary Taxol/carbo with retreatment in  and Doxil/Avastin, topotecan, gemzar and now single agent weekly Taxol initiated 18 following progression with chest wall involvement. In 2018, patient did not follow up and self-discontinued her treatment regimen. She presented to the ER on 18 with SOB. Negative workup for PE or MI. Admitted to Hospitalist service from -18. Managed for acute asthma exacerbation, acute FELICIA, and hyperkalemia. The patient presents today from hospital follow-up and for further discussion regarding management. As stated above, the patient reports that the side effects and fatigue from the chemotherapy is prohibitive. Patient is interested in continuing treatment, but she is especially interested in any possible oral medication. Currently her breathing is much improved since her discharge from the hospital.       OncTx History:  12: MARAH/BSO/Cytoreductive surgery on 12 noted carcinomatosis/omental caking. 10/2012-2013: 6 cycles Carbo/Taxol  2014-2014: 6 Cycles carbo/Taxol  3/2015-5/13/15: 3 cycles Avastin/Doxil until progression  2015-2015 Weekly Topotecan   2015-3/2016: Weekly Topotecan  2017-2017:  Gemzar D1/D8 Q28 days for 6 cycles  2018: CT core needle bx chest wall mass: Metastatic high-grade serous carcinoma (see comment)   General Categorization   Positive for malignancy. 2018-Present: Taxol  K9,8,46; S/P  Cycle #4  2018.  Patient discontinued treatment secondary to side effects and fatigue. CT Results (most recent):  Results from Hospital Encounter encounter on 11/26/18   CTA CHEST W OR W WO CONT    Narrative INDICATION:  Chest pain, acute, pulmonary embolism (PE) suspected     EXAM:  CTA Chest with contrast for Pulmonary Embolus    COMPARISON:  None    TECHNIQUE:  Following the uneventful intravenous administration of 100 cc Isovue  172, thin helical axial images were obtained through the chest. 3D image  postprocessing was performed. CT dose reduction was achieved through use of a  standardized protocol tailored for this examination and automatic exposure  control for dose modulation. Patient has a documented contrast allergy,  although is only premedicated with oral prednisone by the treating emergency  physician despite discussion regarding area policy of IV steroid in the  emergency setting. FINDINGS:    THYROID: Multiple nodules. MEDIASTINUM/SWATI: Multiple enlarged mediastinal and hilar lymph nodes, including  a left thoracic inlet/supraclavicular lymph node measuring 3.3 x 2.4 cm,  bilateral internal mammary chain lymph nodes largest on the right measuring 5.2  x 4.4 cm (previously 4.3 x 3.9 cm); para-aortic lymph node at the arch measures  4.0 x 2.6 cm unchanged. HEART/PERICARDIUM: Enlarged. No pericardial effusion. PULMONARY ARTERIES: Cardiorespiratory motion with no large or central pulmonary  embolus. AORTA:  No aneurysm. LUNGS/PLEURA: Bibasilar atelectasis. No consolidation, edema or effusion. INCIDENTALLY IMAGED UPPER ABDOMEN: Gastrohepatic and rocio hepatis  lymphadenopathy partly visualized. BONES: Area of sclerosis upper sternum in an area of previous lysis. ADDITIONAL COMMENTS:  N/A      Impression IMPRESSION:    1. Cardiorespiratory motion with no large or central pulmonary embolus. 2. Extensive thoracic lymphadenopathy, may have slightly increased in the  interval. Tree.  Sclerotic changes upper sternum in the area of previous lysis  adjacent to right internal mammary chain/parasternal lymph node/mass. ROS  Constitutional: no weight loss, fever, night sweats  Respiratory: no cough, shortness of breath, or wheezing  Cardiovascular: no chest pain or dyspnea on exertion. Reports chest wall mass smaller, no associated pain. Heme: No abnormal bleeding  Gastrointestinal: no abdominal pain, change in bowel habits, or black or bloody stools  Genito-Urinary: no dysuria, trouble voiding, or hematuria  Musculoskeletal: + swelling in ankle - bilateral, mostly only at end of day  Neurological: mild neuropathy  Derm: pigmentation/induration medial left leg. Prior injury from fall. Psych: positive for depression - controlled        PMH:  Past Medical History:   Diagnosis Date    Adverse effect of anesthesia     sleep apnea cpap machine useage     Anemia     Arthritis     DDD low back and knees    Asthma     uses albuterol prn only; none x 6 mos.   Never hospitalized    BRCA negative 1/2013    Calculus of kidney     Chronic kidney disease     kidney stones    Chronic pain     knee pain bilat    CINV (chemotherapy-induced nausea and vomiting) 8/21/2014    Decreased hearing, right     PATIENT STATES THIS IS DUE TO CHEMOTHERAPY    Diabetes ([address] Age 46    IDDM    Environmental allergies     Fibromyalgia     GERD (gastroesophageal reflux disease)     controlled with med    Hx of pulmonary embolus during pregnancy 1/18/2015    Hypertension     Ill-defined condition     Sepsis 9-2015 -  SOURCE PORT    Ill-defined condition 2016    chemotherapy     Mass of chest wall 05/2018    Morbid obesity (HCC)     Murmur, heart     Other and unspecified hyperlipidemia     Ovarian cancer ([address] 9/2012, 1/2014    serous, high grade, stage IIIc. s/p chemotx (has port)    Psychiatric disorder     Rectal bleeding     Thromboembolus ([address] 1993    POST PARTUM; resolved- PELVIS    Thyroid disease     HYPOTHYROID    Unspecified sleep apnea CPAP, Dr. Solano Serum     PSH:  Past Surgical History:   Procedure Laterality Date    BREAST SURGERY PROCEDURE UNLISTED      Lymph node in left breast 2017    CHEST SURGERY PROCEDURE UNLISTED      Placement of port a cath right chest    HX GYN       X2    HX HYSTERECTOMY  2012    ROULA/BSO, tumor debulking, omentectomy for ovarian cancer    HX ORTHOPAEDIC      ankle-FX, R    HX ORTHOPAEDIC      FX R ARM    HX UROLOGICAL      stent    HX VASCULAR ACCESS  2015    right chest- port placed     SOC:  Social History     Socioeconomic History    Marital status:      Spouse name: Not on file    Number of children: Not on file    Years of education: Not on file    Highest education level: Not on file   Social Needs    Financial resource strain: Not on file    Food insecurity - worry: Not on file    Food insecurity - inability: Not on file   [employer] needs - medical: Not on file   [employer] needs - non-medical: Not on file   Occupational History    Not on file   Tobacco Use    Smoking status: Never Smoker    Smokeless tobacco: Never Used   Substance and Sexual Activity    Alcohol use: Yes     Comment: 2 DRINKS EVERY 2 MONTHS    Drug use: No    Sexual activity: Yes   Other Topics Concern    Not on file   Social History Narrative    Lives in [address] [place] [address] alone.  passed away in  of a heart attack. Has 2 sons. Disability. 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 Enjoys swimming and going to the gym.      Family History  Family History   Problem Relation Age of Onset    Hypertension Mother     Arthritis-osteo Mother     Lung Disease Father         COPD    Hypertension Father     Arthritis-osteo Father     Cancer Father         PROSTATE    Hypertension Brother     Elevated Lipids Brother     Arthritis-osteo Brother     Hypertension Brother     Elevated Lipids Brother     Obesity Brother     Cancer Brother         PROSTATE    Anesth Problems Son         DELAYED AWAKENING    Diabetes Maternal Grandmother     Anesth Problems Paternal Grandmother         [address] STOPPED DURING SURGERY     Medications: (reviewed)  Current Outpatient Medications on File Prior to Visit   Medication Sig Dispense Refill    pantoprazole (PROTONIX) 40 mg tablet Take 1 Tab by mouth Daily (before breakfast) for 30 days. 30 Tab 0    azelastine (ASTELIN) 137 mcg (0.1 %) nasal spray 1 [place] by Both Nostrils route daily. Use in each nostril as directed       insulin detemir U-100 (LEVEMIR) 100 unit/mL injection 20 Units by SubCUTAneous route daily (with lunch).  linaclotide (LINZESS) 145 mcg cap capsule Take 145 mcg by mouth daily as needed.  montelukast (SINGULAIR) 10 mg tablet Take 10 mg by mouth nightly.  ascorbic acid, vitamin C, (VITAMIN C) 1,000 mg tablet Take 1,000 mg by mouth daily.  albuterol (PROVENTIL VENTOLIN) 2.5 mg /3 mL (0.083 %) nebulizer solution 2.5 mg by Nebulization route every four (4) hours as needed for Wheezing.  potassium chloride (KLOR-CON M10) 10 mEq tablet TAKE 1 TAB BY MOUTH TWO (2) DAYS A WEEK. WED AND FRI 90 Tab 1    telmisartan (MICARDIS) 20 mg tablet Take 1 Tab by mouth daily. 90 Tab 3    insulin aspart U-100 (NOVOLOG FLEXPEN U-100 INSULIN) 100 unit/mL inpn INJECT 20 UNITS BEFORE EACH MEAL + 4 UNITS FOR EVERY 50 MG/DL ABOVE 150 MG/DL.  UNITS PER DAY 30 mL 11    omega-3 acid ethyl esters (LOVAZA) 1 gram capsule TAKE ONE CAPSULE BY MOUTH TWICE A  Cap 2    Dexlansoprazole (DEXILANT) 60 mg CpDB Take 1 Cap by mouth daily. 90 Cap 3    levothyroxine (SYNTHROID) 150 mcg tablet TAKE 1 TAB BY MOUTH DAILY (BEFORE BREAKFAST). --[facility] [phone] FOR APPOINTMENT FOR MORE REFILLS 90 Tab 0    Liraglutide (VICTOZA) 0.6 mg/0.1 mL (18 mg/3 mL) pnij 1.8 mg by SubCUTAneous route daily (with lunch).  3 Pen 3    SYMBICORT 160-4.5 mcg/actuation HFAA Take 2 Puffs by inhalation two (2) times daily as needed. 2 Inhaler 3    clonazePAM (KLONOPIN) 0.5 mg tablet Take 0.5 mg by mouth nightly as needed.  sertraline (ZOLOFT) 50 mg tablet TAKE 1 TABLET BY MOUTH EVERY MORNING  1    cholecalciferol, VITAMIN D3, (VITAMIN D3) 5,000 unit tab tablet Take 5,000 Units by mouth daily.  furosemide (LASIX) 40 mg tablet Take 40 mg by mouth every other day. 3    guaiFENesin ER (MUCINEX) 600 mg ER tablet Take 1 Tab by mouth two (2) times a day for 30 days. 60 Tab 0    oxyCODONE-acetaminophen (PERCOCET) 5-325 mg per tablet Take 1 Tab by mouth as needed for Pain (When receiving chemotherapy).  ondansetron (ZOFRAN ODT) 8 mg disintegrating tablet Take 8 mg by mouth as needed for Nausea (When receiving chemotherapy).  lidocaine-prilocaine (EMLA) topical cream Apply small amount over port area one hour before chemo treatment and cover with a Band-Aid 30 g 1     No current facility-administered medications on file prior to visit. Allergies: (reviewed)  Allergies   Allergen Reactions    Carboplatin Other (comments)     Patient developed shortness of breath, felt faint, coughing, skin flushed and \"itchy\". This occurred on the patients 8th treatment.  Iodinated Contrast- Oral And Iv Dye Rash     13 hr pre-medication prior to IV contrast.   Patient has done well with 1 hr pre-medication of (Solu-Medrol) 40mg &  (Benadryl) 50mg.     Lipitor [Atorvastatin] Myalgia    Shellfish Containing Products Hives    Tape [Adhesive] Itching and Other (comments)     \"op site-- clear,thin tape\"--caused blister     Tomato Hives    Olmesartan Other (comments)    Losartan Other (comments)     headaches    Percocet [Oxycodone-Acetaminophen] Other (comments)     Fever; however, current home med       OBJECTIVE  Physical Exam  Visit Vitals  /76 (BP 1 Location: Left arm, BP Patient Position: Sitting)   Pulse 80   Ht 5' 9\" (1.753 m)   Wt 321 lb 6.4 oz (145.8 kg)   LMP 09/07/2011   BMI 47.46 kg/m²     Physical Exam:  General: Alert and oriented. No acute distress. Well-nourished  HEENT: No thyroid enlargment. Neck supple without restrictions. Sclera normal. Normal occular motion. Moist mucous membranes. Lymphatics: No evidence of axillary, cervical, or subclavicular adenopathy. Respiratory: clear to auscultation and percussion to the bases. No CVAT. No discernable chest wall mass on palpation, but clear on imaging. Cardiovascular: regular rate and rhythm. No murmurs, rubs, or gallops. Gastrointestinal: soft, non-tender, non-distended, no masses or organomegaly. Well-healed incision. Musculoskeletal: normal gait. No joint tenderness, deformity or swelling. No muscular tenderness. Extremities: extremities normal, atraumatic, mild 1+ edema bilaterally. Pelvic: deferred today  Neuro: Grossly intact. Normal gait and movement. No acute deficit  Skin: No evidence of rashes or skin changes. DATE REVIEW as available:  Lab Results   Component Value Date/Time    WBC 7.0 11/28/2018 05:29 AM    Hemoglobin (POC) 10.5 (L) 03/20/2017 12:15 PM    HGB 7.7 (L) 11/28/2018 05:29 AM    Hematocrit (POC) 31 (L) 03/20/2017 12:15 PM    HCT 25.9 (L) 11/28/2018 05:29 AM    PLATELET 143 (L) 71/18/6630 05:29 AM    MCV 95.9 11/28/2018 05:29 AM     Lab Results   Component Value Date/Time    ABS. NEUTROPHILS 4.2 11/28/2018 05:29 AM     Lab Results   Component Value Date/Time    Sodium 140 11/29/2018 04:42 AM    Potassium 4.8 11/29/2018 04:42 AM    Chloride 111 (H) 11/29/2018 04:42 AM    CO2 21 11/29/2018 04:42 AM    Anion gap 8 11/29/2018 04:42 AM    Glucose 89 11/29/2018 04:42 AM    BUN 22 (H) 11/29/2018 04:42 AM    Creatinine 1.12 (H) 11/29/2018 04:42 AM    BUN/Creatinine ratio 20 11/29/2018 04:42 AM    GFR est AA >60 11/29/2018 04:42 AM    GFR est non-AA 50 (L) 11/29/2018 04:42 AM    Calcium 8.2 (L) 11/29/2018 04:42 AM    Bilirubin, total 0.4 11/27/2018 05:19 AM    AST (SGOT) 33 11/27/2018 05:19 AM    Alk.  phosphatase 71 11/27/2018 05:19 AM    Protein, total 8.2 11/27/2018 05:19 AM    Albumin 3.0 (L) 11/27/2018 05:19 AM    Globulin 5.2 (H) 11/27/2018 05:19 AM    A-G Ratio 0.6 (L) 11/27/2018 05:19 AM    ALT (SGPT) 25 11/27/2018 05:19 AM       ECHO 7/17/18  Left ventricle: Systolic function was normal. Ejection fraction was estimated in the range of 55 % to 60 %. There were no regional wall motion  abnormalities. Wall thickness was mildly increased. Left atrium: The atrium was mildly dilated. Mitral valve: There was mild regurgitation. Aortic valve: Leaflets exhibited sclerosis without stenosis. Not well visualized. IMPRESSION AND PLAN:  Ms. Meri Dick is a 63 y.o. female with recurrent, metastatic ovarian cancer. Presents today for discussion of further management. Lost to follow-up since 9/2018, at which time patient self-discontinued weekly paclitaxel. ECOG 1    Problem List Items Addressed This Visit        Immune    Lymphadenopathy, mediastinal       Reproductive    Ovarian cancer ([facility] - Primary       Other    Carcinomatosis ([address]    Morbid obesity ([address]    Pain due to malignant neoplasm metastatic to bone [facility])    Mass of chest wall        Reviewed patient's course to date, including her recent CT chest results from 11/26/18. While ideally I would like to also obtain a CT A/P, today's discussion was mainly related to treatment options and goals of care. The patient is largely asymptomatic from her disease, even though she has extensive metastatic disease. She does have some increasing pain demands related to her chest wall mass, but denies abdominal symptoms. Counseled patient regarding the palliative nature of any form of treatment at this point, which the patient and her son were understanding of. I reviewed more typically cytotoxic chemotherapy agents along with PARPi. While the patient is BRCA negative, there is some evidence that PARPi may provide some benefit to BRCA wild-type patients.  I also spent some time reviewing goals of care, including that any type of treatment is palliative in nature. Counseled patient regarding code status and advance care directives, which the patient is going to consider. She reports she has a \"living will\", but I am unsure as to her wishes regarding code status. Will plan to continue this dialog at future visits. At this time, patient would like to move forward with Zejula (niraparib). Will plan for CT A/P after patient receives approval, prior to initiation of treatment. All questions and concerns were addressed with the patient and she is comfortable with the plan.     Nikolas Winters MD

## 2018-12-04 NOTE — PROGRESS NOTES
SUBJECTIVE  Ms. Dahiana Hawkins presents today acutely for JOSIE management. Chief Complaint   Patient presents with   [facility]     pt here today for f.u from [place] [address]-- for respiratory issue and chest pain        She was just in hospital at [facility] 11/27 - 11/29:     History:    A 60-year-old -American female with past medical history of ovarian carcinoma with metastatic disease, anemia, arthritis, degenerative disk disease, asthma, chronic kidney disease, kidney stone, chronic pain, type 2 diabetes mellitus, fibromyalgia, GERD, hypertension, chest wall mass, morbid obesity, heart murmur, hyperlipidemia, hypothyroidism, sleep apnea, presented to the emergency department from home with chief complaints of chest pain. The patient notably has a chest wall mass and has chronic pain symptoms associated with the same. However, she notes her pain has worsened,  mostly located in the right side of the chest, rated an 8/10, dull aching, nonradiating, without specific other exacerbating or alleviating factors. She complains of shortness of breath and cough. There are other reports of wheezing, fever, diaphoresis. She reportedly had taken Tylenol without relief of symptoms. She went to urgent care center yesterday for evaluation and subsequently referred to the ED. On arrival to the emergency department, initial recorded vital signs of blood pressure 134/73, heart rate 91, respiratory rate of 22, O2 saturation 96% on room air. Workup included chest x-ray, PA and lateral, showing no acute cardiopulmonary process. Twelve-lead EKG showed normal sinus rhythm at 85 beats per minute. CTA of the chest with IV contrast was negative for evidence of pulmonary embolism, however, showed extensive thoracic lymphadenopathy slightly increased with sclerotic changes at upper sternum in the area of previous lysis adjacent to the right internal mammary chain/parasternal lymph node/mass.   Patient has been followed by GYN oncologist, Dr. Taisha Sarmiento, with last visit in September. The patient had been on chemotherapy, but she had discontinued after having a tooth extraction surgery. Tonight patient has been given DuoNeb nebulizer treatment, prednisone 40 mg p.o., Benadryl 50 mg IV. ED started the patient on Rocephin 1 gram IV. There were no direct sources for any infection. She has been febrile with initial temperature of 100.0 degrees Fahrenheit. Other abnormal labs included potassium of 5.90, serum CO2 of 20, and hemoglobin 8.6, BUN of 27, creatinine 1.51 (compared to the last creatinine of 1.26 on 07/31/2018). Patient is now seen for admission to the hospitalist service for continued evaluation and treatment. The patient does not complain of any current dizziness, lightheadedness, focal weakness, new-onset numbness, paresthesias, slurred speech, facial droop, headache, neck pain, back pain, abdominal pain, nausea, vomiting, diarrhea, melena, dysuria, hematuria, calf pain, increased leg swelling, edema. Hospital Course:   #. Chest pain: likely related to cancer. Has chronic pain  - usually controlled with her pain regimen, recently not controlled, may require more often dose  - EKG normal sinus on admission. Troponin levels wnl, no hx of CAD  - Analgesia prn, reviewed by Oncology. Will need follow up with them and with PCP     #. Dehydration: resolved. #. FELICIA: 2nd to above, improving, monitor  #. Hyperkalemia: resolved. #. Fatigue: related to Ca, PT/OT evaluated, deemed independent  #. Dry cough and wheeze: Ca related, CTA chest -ve for acute findings except for metastatic cancer and mass on right chest wall area. Symptomatic Mg. \"I still be blowing out so much stuff. \"  Coughing, and nasal drainage. Cough is worst at night and when she first gets up. She has oncology appointment today at 3.      Past Medical History:   Diagnosis Date    Adverse effect of anesthesia     sleep apnea cpap machine useage     Anemia     Arthritis     DDD low back and knees    Asthma     uses albuterol prn only; none x 6 mos. Never hospitalized    BRCA negative 1/2013    Calculus of kidney     Chronic kidney disease     kidney stones    Chronic pain     knee pain bilat    CINV (chemotherapy-induced nausea and vomiting) 8/21/2014    Decreased hearing, right     PATIENT STATES THIS IS DUE TO CHEMOTHERAPY    Diabetes ([facility] Age 46    IDDM    Environmental allergies     Fibromyalgia     GERD (gastroesophageal reflux disease)     controlled with med    Hx of pulmonary embolus during pregnancy 1/18/2015    Hypertension     Ill-defined condition     Sepsis 9-2015 -  SOURCE PORT    Ill-defined condition 2016    chemotherapy     Mass of chest wall 05/2018    Morbid obesity (HCC)     Murmur, heart     Other and unspecified hyperlipidemia     Ovarian cancer ([facility] [address] 9/2012, 1/2014    serous, high grade, stage IIIc. s/p chemotx (has port)    Psychiatric disorder     Rectal bleeding     Thromboembolus ([facility] [address] [facility] 1993    POST PARTUM; resolved- PELVIS    Thyroid disease     HYPOTHYROID    Unspecified sleep apnea     CPAP, Dr. Mindy Marin       SH:  reports that  has never smoked. she has never used smokeless tobacco. She reports that she drinks alcohol. She reports that she does not use drugs. ROS: Complete review of systems was performed and is otherwise unremarkable except as noted elsewhere. OBJECTIVE  Visit Vitals  /70 (BP 1 Location: Left arm, BP Patient Position: Sitting)   Pulse 74   Temp 97.4 °F (36.3 °C) (Oral)   Resp 16   Ht 5' 9\" (1.753 m)   Wt 320 lb 9.6 oz (145.4 kg)   LMP 09/07/2011   SpO2 95%   BMI 47.34 kg/m²     Gen: Pleasant 63 y.o.  female in NAD.   HEENT: PERRLA. EOMI. OP moist and pink.  Neck: Supple.  No LAD.  HEART: RRR, No M/G/R.   LUNGS: CTAB No W/R.   ABDOMEN: S, NT, ND, BS+.   EXTREMITIES: Warm. No C/C/E. MUSCULOSKELETAL: Normal ROM, muscle strength 5/5 all groups.  NEURO: Alert and oriented x 3.  Cranial nerves grossly intact.  No focal sensory or motor deficits noted. SKIN: Warm. Dry. No rashes or other lesions noted. CTA chest: 1. Cardiorespiratory motion with no large or central pulmonary embolus. 2. Extensive thoracic lymphadenopathy, may have slightly increased in the  interval. Tree. Sclerotic changes upper sternum in the area of previous lysis  adjacent to right internal mammary chain/parasternal lymph node/mass. Duplex LE: Negative. ASSESSMENT / PLAN  1. Chest pain: Imaging as above. EKG and troponins  neagative. Could be related to cancer. 2. Lung mass / metastases: Has follow up later today with her oncologist.   3. Chronic pain: Per Oncology  4. Dehydration: resolved. 5. FELICIA: Recheck labs. 6. Hyperkalemia: resolved. 7. Fatigue: related to Ca, PT/OT evaluated, deemed independent  8. Dry cough and wheeze: Ongoing. For now, symptomatic management    I have reviewed with the patient details of the assessment and plan and all questions were answered. Relevant patient education was performed. Follow-up Disposition:  Return in about 4 months (around 4/4/2019) for DM, thyroid.

## 2018-12-05 ENCOUNTER — TELEPHONE (OUTPATIENT)
Dept: GYNECOLOGY | Age: 58
End: 2018-12-05

## 2018-12-05 DIAGNOSIS — T45.1X5A CINV (CHEMOTHERAPY-INDUCED NAUSEA AND VOMITING): Primary | ICD-10-CM

## 2018-12-05 DIAGNOSIS — R11.2 CINV (CHEMOTHERAPY-INDUCED NAUSEA AND VOMITING): Primary | ICD-10-CM

## 2018-12-05 RX ORDER — ONDANSETRON 4 MG/1
4 TABLET, ORALLY DISINTEGRATING ORAL
Qty: 30 TAB | Refills: 2 | Status: SHIPPED | OUTPATIENT
Start: 2018-12-05 | End: 2019-04-02 | Stop reason: SDUPTHER

## 2018-12-05 NOTE — TELEPHONE ENCOUNTER
I called pt to review procedure of ordering Zejula, insurance auth, and financial assistance if needed. Also reviewed side effects, diet and hydration, fatigue, and when to call office with any concerns. Zofran will be sent to pharmacy. All questions answered and pt will call me when shipment arrives.

## 2018-12-05 NOTE — TELEPHONE ENCOUNTER
Requested Prescriptions     Signed Prescriptions Disp Refills    ondansetron (ZOFRAN ODT) 4 mg disintegrating tablet 30 Tab 2     Sig: Take 1 Tab by mouth every eight (8) hours as needed for Nausea. Authorizing Provider: Matthieu Kessler     Ordering User: Lyndsay Regalado     Rx escribed to [facility] per pt request and vo Dr. Johnson Mems given today at 0407. Pt will be starting [facility].

## 2018-12-07 ENCOUNTER — TELEPHONE (OUTPATIENT)
Dept: INTERNAL MEDICINE CLINIC | Age: 58
End: 2018-12-07

## 2018-12-07 ENCOUNTER — PATIENT OUTREACH (OUTPATIENT)
Dept: INTERNAL MEDICINE CLINIC | Age: 58
End: 2018-12-07

## 2018-12-07 NOTE — TELEPHONE ENCOUNTER
Returned phone call to patient to advise that call was made in error; patient expressed no questions/concerns at this time.

## 2018-12-10 RX ORDER — LINACLOTIDE 145 UG/1
CAPSULE, GELATIN COATED ORAL
Qty: 30 CAP | Refills: 0 | Status: SHIPPED | OUTPATIENT
Start: 2018-12-10 | End: 2019-01-14 | Stop reason: SDUPTHER

## 2018-12-12 ENCOUNTER — PATIENT OUTREACH (OUTPATIENT)
Dept: INTERNAL MEDICINE CLINIC | Age: 58
End: 2018-12-12

## 2018-12-12 NOTE — PROGRESS NOTES
Goals Addressed                 This Visit's Progress       Post Hospitalization     Prevent complications post hospitalization. On track     11/30/2018   - currently taking OTC tylenol for pain management. Needs refill of pain medication from Oncologist and plans to request this at upcoming appointment next week; encouraged patient outreach to Oncologist this morning to request refill so that, if ready, she could  prescription on her way home from work this afternoon  -  Scheduled for [facility] appointment with PCP for 12/4; declined appt that was offered for 12/3 due to her work schedule  - has left subclavian portacath  - has appointment scheduled with Oncologist 12/4 at 3:30 pm  - NN outreach today is brief due to patient is currently at work    12/7/2018  - attended [facility] appt with Dr. Shea Isaac 12/4/18, cbc and cmp ordered at this visit - not yet collected; to f/u with PCP in 4 months  - attended office visit with Dr. Ricci Green ([place]) 12/4/18; to proceed with Susan Brake (niraparib) for palliative treatment of recurrent, metastatic ovarian cancer  - states that she is feeling \"much better\"; denies questions/concerns  - plans to begin Susan Brake in two weeks  - notified patient of PCP lab orders; patient will have labs drawn at PCP office lab on 12/10 and advised patient that lab closes at 430p    12/12/2018  - states that she is feeling \"much better. \"  - continues to take OTC Mucinex for cough and congestion; denies worsening of cough post-discharge. Denies fever, chills. Per PCP office visit note, cough is related to Ca; symptom management. - plans to begin Susan Brake next week  - did not have labs drawn on 12/10 due to inclement weather; will have labs drawn at PCP office lab on 12/13          Future Appointments:  No future appointments.        Last Appointment My Department:  12/4/2018

## 2018-12-13 ENCOUNTER — APPOINTMENT (OUTPATIENT)
Dept: INTERNAL MEDICINE CLINIC | Age: 58
End: 2018-12-13

## 2018-12-14 ENCOUNTER — TELEPHONE (OUTPATIENT)
Dept: GYNECOLOGY | Age: 58
End: 2018-12-14

## 2018-12-14 LAB
ALBUMIN SERPL-MCNC: 4 G/DL (ref 3.5–5.5)
ALBUMIN/GLOB SERPL: 0.9 {RATIO} (ref 1.2–2.2)
ALP SERPL-CCNC: 86 IU/L (ref 39–117)
ALT SERPL-CCNC: 20 IU/L (ref 0–32)
AST SERPL-CCNC: 32 IU/L (ref 0–40)
BASOPHILS # BLD AUTO: 0 X10E3/UL (ref 0–0.2)
BASOPHILS NFR BLD AUTO: 0 %
BILIRUB SERPL-MCNC: 0.3 MG/DL (ref 0–1.2)
BUN SERPL-MCNC: 28 MG/DL (ref 6–24)
BUN/CREAT SERPL: 21 (ref 9–23)
CALCIUM SERPL-MCNC: 9.4 MG/DL (ref 8.7–10.2)
CHLORIDE SERPL-SCNC: 108 MMOL/L (ref 96–106)
CO2 SERPL-SCNC: 21 MMOL/L (ref 20–29)
CREAT SERPL-MCNC: 1.34 MG/DL (ref 0.57–1)
EOSINOPHIL # BLD AUTO: 0.4 X10E3/UL (ref 0–0.4)
EOSINOPHIL NFR BLD AUTO: 5 %
ERYTHROCYTE [DISTWIDTH] IN BLOOD BY AUTOMATED COUNT: 14.9 % (ref 12.3–15.4)
GLOBULIN SER CALC-MCNC: 4.3 G/DL (ref 1.5–4.5)
GLUCOSE SERPL-MCNC: 122 MG/DL (ref 65–99)
HCT VFR BLD AUTO: 29.2 % (ref 34–46.6)
HGB BLD-MCNC: 8.9 G/DL (ref 11.1–15.9)
IMM GRANULOCYTES # BLD: 0 X10E3/UL (ref 0–0.1)
IMM GRANULOCYTES NFR BLD: 0 %
LYMPHOCYTES # BLD AUTO: 2.3 X10E3/UL (ref 0.7–3.1)
LYMPHOCYTES NFR BLD AUTO: 29 %
MCH RBC QN AUTO: 27.4 PG (ref 26.6–33)
MCHC RBC AUTO-ENTMCNC: 30.5 G/DL (ref 31.5–35.7)
MCV RBC AUTO: 90 FL (ref 79–97)
MONOCYTES # BLD AUTO: 0.7 X10E3/UL (ref 0.1–0.9)
MONOCYTES NFR BLD AUTO: 8 %
NEUTROPHILS # BLD AUTO: 4.6 X10E3/UL (ref 1.4–7)
NEUTROPHILS NFR BLD AUTO: 58 %
PLATELET # BLD AUTO: 211 X10E3/UL (ref 150–379)
POTASSIUM SERPL-SCNC: 5.4 MMOL/L (ref 3.5–5.2)
PROT SERPL-MCNC: 8.3 G/DL (ref 6–8.5)
RBC # BLD AUTO: 3.25 X10E6/UL (ref 3.77–5.28)
SODIUM SERPL-SCNC: 142 MMOL/L (ref 134–144)
WBC # BLD AUTO: 8 X10E3/UL (ref 3.4–10.8)

## 2018-12-14 NOTE — TELEPHONE ENCOUNTER
I called pt to come into office to sign [employer] forms so I can fax information and for financial assistance if needed.   She will come in 12/17/18 after 3:00pm.

## 2018-12-19 ENCOUNTER — PATIENT OUTREACH (OUTPATIENT)
Dept: INTERNAL MEDICINE CLINIC | Age: 58
End: 2018-12-19

## 2018-12-19 NOTE — PROGRESS NOTES
NN attempted patient outreach today in order to perform routine JOSIE follow-up; unable to lvm. Goals Addressed                 This Visit's Progress       Post Hospitalization     Prevent complications post hospitalization. On track     11/30/2018   - currently taking OTC tylenol for pain management. Needs refill of pain medication from Oncologist and plans to request this at upcoming appointment next week; encouraged patient outreach to Oncologist this morning to request refill so that, if ready, she could  prescription on her way home from work this afternoon  -  Scheduled for EDWARD HI appointment with PCP for 12/4; declined appt that was offered for 12/3 due to her work schedule  - has left subclavian portacath  - has appointment scheduled with Oncologist 12/4 at 3:30 pm  - NN outreach today is brief due to patient is currently at work    12/7/2018  - attended [facility] appt with Dr. Yves Holland 12/4/18, cbc and cmp ordered at this visit - not yet collected; to f/u with PCP in 4 months  - attended office visit with Dr. Gwen Espino ([place]) 12/4/18; to proceed with Jud Fears (niraparib) for palliative treatment of recurrent, metastatic ovarian cancer  - states that she is feeling \"much better\"; denies questions/concerns  - plans to begin Dorisanir Fears in two weeks  - notified patient of PCP lab orders; patient will have labs drawn at PCP office lab on 12/10 and advised patient that lab closes at 430p    12/12/2018  - states that she is feeling \"much better. \"  - continues to take OTC Mucinex for cough and congestion; denies worsening of cough post-discharge. Denies fever, chills. Per PCP office visit note, cough is related to Ca; symptom management. - plans to begin Dorisanir Fears next week  - did not have labs drawn on 12/10 due to inclement weather; will have labs drawn at PCP office lab on 12/13 12/19/2018  - labs completed 12/13; result note reviewed - anemia improved. Hgb 8.9          Future Appointments:  No future appointments.      Last Appointment My Department:  12/13/2018

## 2018-12-21 ENCOUNTER — TELEPHONE (OUTPATIENT)
Dept: GYNECOLOGY | Age: 58
End: 2018-12-21

## 2018-12-21 NOTE — TELEPHONE ENCOUNTER
Patient to bring current insurance card for her chart, as Pedro Donnelly called and the current card on file is not valid with [employer].

## 2018-12-21 NOTE — TELEPHONE ENCOUNTER
Pt will come into office on 12/26/18 to update her information for name and new insurance card. I will at that point resubmit Rx to [facility] and Me application.

## 2019-01-03 DIAGNOSIS — E03.9 ACQUIRED HYPOTHYROIDISM: ICD-10-CM

## 2019-01-04 RX ORDER — LEVOTHYROXINE SODIUM 150 UG/1
150 TABLET ORAL
Qty: 90 TAB | Refills: 0 | Status: SHIPPED | OUTPATIENT
Start: 2019-01-04 | End: 2019-04-02 | Stop reason: SDUPTHER

## 2019-01-08 ENCOUNTER — TELEPHONE (OUTPATIENT)
Dept: GYNECOLOGY | Age: 59
End: 2019-01-08

## 2019-01-08 ENCOUNTER — PATIENT OUTREACH (OUTPATIENT)
Dept: INTERNAL MEDICINE CLINIC | Age: 59
End: 2019-01-08

## 2019-01-08 NOTE — PROGRESS NOTES
Patient has graduated from the Transitions of Care Coordination  program on 1/8/2019. Goals Addressed This Visit's Progress Diabetes  Patient verbalizes understanding of self -management goals of living with Diabetes. 1/8/2019 
- attended office visit with Ophthalmologist today for complete eye exam and reports advised to schedule follow-up with endocrinologist as soon as possible due to concern for uncontrolled blood glucose that \"might be affecting my eyes. \" Patient unable to be scheduled for appt with Endocrinologist until April due to appt availability; reports Ophthalmologist office visit note was being sent to Dr. Dayo Lundberg for review 
- monitoring blood sugars twice daily at home (morning and evening); maintaining written record of blood glucose results, however is not at home during this outreach encounter and is unable to review most recent results 
- most recent hemoglobin a1c 11/27/18 - 5.5%. Most recent office visit with Dr. Artie Mack (Endocrinology) 3/3/17 
- reports blood glucose range of  mg/dL fasting and 130s in the evening 
- scheduled for office visit with Dr. Dayo Lundberg 1/15/19 to review/discuss Post Hospitalization  COMPLETED: Prevent complications post hospitalization. On track 11/30/2018  
- currently taking OTC tylenol for pain management. Needs refill of pain medication from Oncologist and plans to request this at upcoming appointment next week; encouraged patient outreach to Oncologist this morning to request refill so that, if ready, she could  prescription on her way home from work this afternoon 
-  Scheduled for EDWARD HI appointment with PCP for 12/4; declined appt that was offered for 12/3 due to her work schedule 
- has left subclavian portacath 
- has appointment scheduled with Oncologist 12/4 at 3:30 pm 
- NN outreach today is brief due to patient is currently at work 
 
12/7/2018 - attended [facility] appt with Dr. Haydee Leonardo 12/4/18, cbc and cmp ordered at this visit - not yet collected; to f/u with PCP in 4 months 
- attended office visit with Dr. Mima Santana (Health system) 12/4/18; to proceed with Martina Dauer (niraparib) for palliative treatment of recurrent, metastatic ovarian cancer 
- states that she is feeling \"much better\"; denies questions/concerns - plans to begin Martina Dauer in two weeks 
- notified patient of PCP lab orders; patient will have labs drawn at PCP office lab on 12/10 and advised patient that lab closes at 430p 
 
12/12/2018 
- states that she is feeling \"much better. \" 
- continues to take OTC Mucinex for cough and congestion; denies worsening of cough post-discharge. Denies fever, chills. Per PCP office visit note, cough is related to Ca; symptom management. - plans to begin Martina Dauer next week 
- did not have labs drawn on 12/10 due to inclement weather; will have labs drawn at PCP office lab on 12/13 12/19/2018 
- labs completed 12/13; result note reviewed - anemia improved. Hgb 8.9 
 
1/8/2019 - To the best of this NN's knowledge, this patient had no additional ED visits or Hospital Admissions during 30 day JOSIE period following admission to [facility] 11/26/18-11/29/18.  
- JOSIE period has ended. Goal Completed. Future Appointments: 
Future Appointments Date Time Provider Gabriel Phillip 1/15/2019  2:45 PM Lina Garcia MD [address]  
5/21/2019 11:30 AM Mark Anthony García MD R[facility] Last Appointment My Department: 
12/13/2018

## 2019-01-11 ENCOUNTER — PATIENT OUTREACH (OUTPATIENT)
Dept: INTERNAL MEDICINE CLINIC | Age: 59
End: 2019-01-11

## 2019-01-11 NOTE — PROGRESS NOTES
Goals Addressed This Visit's Progress Diabetes  Patient verbalizes understanding of self -management goals of living with Diabetes. 1/8/2019 
- attended office visit with Ophthalmologist today for complete eye exam and reports advised to schedule follow-up with endocrinologist as soon as possible due to concern for uncontrolled blood glucose that \"might be affecting my eyes. \" Patient unable to be scheduled for appt with Endocrinologist until April due to appt availability; reports Ophthalmologist office visit note was being sent to Dr. Dimitris Lares for review 
- monitoring blood sugars twice daily at home (morning and evening); maintaining written record of blood glucose results, however is not at home during this outreach encounter and is unable to review most recent results 
- most recent hemoglobin a1c 11/27/18 - 5.5%. Most recent office visit with Dr. Zeeshan Garrison (Endocrinology) 3/3/17 
- reports blood glucose range of  mg/dL fasting and 130s in the evening 
- scheduled for office visit with Dr. Dimitris Lares 1/15/19 to review/discuss 1/11/2019 
- patient currently driving and unable to review most recent blood glucose readings with this NN; patient will bring home blood glucose record to scheduled office visit with PCP on 1/15/19

## 2019-01-14 RX ORDER — LINACLOTIDE 145 UG/1
CAPSULE, GELATIN COATED ORAL
Qty: 30 CAP | Refills: 0 | Status: SHIPPED | OUTPATIENT
Start: 2019-01-14 | End: 2019-02-14 | Stop reason: SDUPTHER

## 2019-01-15 ENCOUNTER — OFFICE VISIT (OUTPATIENT)
Dept: INTERNAL MEDICINE CLINIC | Age: 59
End: 2019-01-15

## 2019-01-15 VITALS
DIASTOLIC BLOOD PRESSURE: 74 MMHG | HEIGHT: 69 IN | WEIGHT: 293 LBS | SYSTOLIC BLOOD PRESSURE: 123 MMHG | HEART RATE: 89 BPM | OXYGEN SATURATION: 96 % | TEMPERATURE: 98.8 F | RESPIRATION RATE: 16 BRPM | BODY MASS INDEX: 43.4 KG/M2

## 2019-01-15 DIAGNOSIS — I10 BENIGN ESSENTIAL HYPERTENSION: ICD-10-CM

## 2019-01-15 DIAGNOSIS — E11.9 CONTROLLED TYPE 2 DIABETES MELLITUS WITHOUT COMPLICATION, WITH LONG-TERM CURRENT USE OF INSULIN (HCC): Primary | ICD-10-CM

## 2019-01-15 DIAGNOSIS — Z79.4 CONTROLLED TYPE 2 DIABETES MELLITUS WITHOUT COMPLICATION, WITH LONG-TERM CURRENT USE OF INSULIN (HCC): Primary | ICD-10-CM

## 2019-01-15 DIAGNOSIS — E78.5 DYSLIPIDEMIA (HIGH LDL; LOW HDL): ICD-10-CM

## 2019-01-15 DIAGNOSIS — E03.9 HYPOTHYROIDISM, UNSPECIFIED TYPE: ICD-10-CM

## 2019-01-15 RX ORDER — EPINEPHRINE 0.3 MG/.3ML
0.3 INJECTION SUBCUTANEOUS
Qty: 1 ML | Refills: 1 | Status: SHIPPED | OUTPATIENT
Start: 2019-01-15 | End: 2019-01-15

## 2019-01-15 RX ORDER — INSULIN PUMP SYRINGE, 3 ML
EACH MISCELLANEOUS
Qty: 1 KIT | Refills: 0 | Status: ON HOLD | OUTPATIENT
Start: 2019-01-15 | End: 2021-01-01

## 2019-01-15 NOTE — PROGRESS NOTES
1. Have you been to the ER, urgent care clinic since your last visit? Hospitalized since your last visit?no    2. Have you seen or consulted any other health care providers outside of the [address] [place] [address] since your last visit? Include any pap smears or colon screening.  no

## 2019-01-15 NOTE — PROGRESS NOTES
SUBJECTIVE:   Ms. Sonali Bunn is a 63 y.o. female who is here for follow up of routine medical issues. Chief Complaint   Patient presents with    Diabetes     pt here today to fJanethu on dm and discuss visit with eye doctor    Knee Pain     pt here today pain in both knee's (ongoing issue) pt told she needs a knee replacement     Memory Loss     pt concerned of memory loss       She has \"protein in my eyes\" noted by Dr. Raji Martinez. \"He worred about my diabetes, but my A1C was 5. 1. \"  On a new chemo agent. History of ovarian cancer, s/p MARAH and chemotherapy. Dr. Fran Dennison (prev Cindijessica Godoy). R knee pain bothers her lately. DM, doing well. Had been seeing Dr. Loida Calderon, but he has released her. At this time, she is otherwise doing well and has brought no other complaints to my attention today. For a list of the medical issues addressed today, see the assessment and plan below. PMH:   Past Medical History:   Diagnosis Date    Adverse effect of anesthesia     sleep apnea cpap machine useage     Anemia     Arthritis     DDD low back and knees    Asthma     uses albuterol prn only; none x 6 mos.   Never hospitalized    BRCA negative 1/2013    Calculus of kidney     Chronic kidney disease     kidney stones    Chronic pain     knee pain bilat    CINV (chemotherapy-induced nausea and vomiting) 8/21/2014    Decreased hearing, right     PATIENT STATES THIS IS DUE TO CHEMOTHERAPY    Diabetes ([address] Age 46    IDDM    Environmental allergies     Fibromyalgia     GERD (gastroesophageal reflux disease)     controlled with med    Hx of pulmonary embolus during pregnancy 1/18/2015    Hypertension     Ill-defined condition     Sepsis 9-2015 -  SOURCE PORT    Ill-defined condition 2016    chemotherapy     Mass of chest wall 05/2018    Morbid obesity ([address]     Murmur, heart     Other and unspecified hyperlipidemia     Ovarian cancer ([address] 9/2012, 1/2014    serous, high grade, stage IIIc. s/p chemotx (has port)    Psychiatric disorder     Rectal bleeding     Thromboembolus ([facility]     POST PARTUM; resolved- PELVIS    Thyroid disease     HYPOTHYROID    Unspecified sleep apnea     CPAP, Dr. Jermaine Jeong       Past Surgical History:   Procedure Laterality Date    BREAST SURGERY PROCEDURE UNLISTED      Lymph node in left breast 2017    CHEST SURGERY PROCEDURE UNLISTED      Placement of port a cath right chest    HX GYN       X2    HX HYSTERECTOMY  2012    ROULA/BSO, tumor debulking, omentectomy for ovarian cancer    HX ORTHOPAEDIC      ankle-FX, R    HX ORTHOPAEDIC      FX R ARM    HX UROLOGICAL      stent    HX VASCULAR ACCESS  2015    right chest- port placed       All: is allergic to carboplatin; iodinated contrast- oral and iv dye; lipitor [atorvastatin]; shellfish containing products; tape [adhesive]; tomato; olmesartan; losartan; and percocet [oxycodone-acetaminophen]. Current Outpatient Medications   Medication Sig    LINZESS 145 mcg cap capsule TAKE 1 CAPSULE BY MOUTH EVERY DAYY    levothyroxine (SYNTHROID) 150 mcg tablet Take 1 Tab by mouth Daily (before breakfast).  ondansetron (ZOFRAN ODT) 4 mg disintegrating tablet Take 1 Tab by mouth every eight (8) hours as needed for Nausea.  azelastine (ASTELIN) 137 mcg (0.1 %) nasal spray 1 [place] by Both Nostrils route daily. Use in each nostril as directed     insulin detemir U-100 (LEVEMIR) 100 unit/mL injection 20 Units by SubCUTAneous route daily (with lunch).  linaclotide (LINZESS) 145 mcg cap capsule Take 145 mcg by mouth daily as needed.  montelukast (SINGULAIR) 10 mg tablet Take 10 mg by mouth nightly.  oxyCODONE-acetaminophen (PERCOCET) 5-325 mg per tablet Take 1 Tab by mouth as needed for Pain (When receiving chemotherapy).  ascorbic acid, vitamin C, (VITAMIN C) 1,000 mg tablet Take 1,000 mg by mouth daily.     albuterol (PROVENTIL VENTOLIN) 2.5 mg /3 mL (0.083 %) nebulizer solution 2.5 mg by Nebulization route every four (4) hours as needed for Wheezing.  potassium chloride (KLOR-CON M10) 10 mEq tablet TAKE 1 TAB BY MOUTH TWO (2) DAYS A WEEK. WED AND FRI    telmisartan (MICARDIS) 20 mg tablet Take 1 Tab by mouth daily.  insulin aspart U-100 (NOVOLOG FLEXPEN U-100 INSULIN) 100 unit/mL inpn INJECT 20 UNITS BEFORE EACH MEAL + 4 UNITS FOR EVERY 50 MG/DL ABOVE 150 MG/DL.  UNITS PER DAY    omega-3 acid ethyl esters (LOVAZA) 1 gram capsule TAKE ONE CAPSULE BY MOUTH TWICE A DAY    Dexlansoprazole (DEXILANT) 60 mg CpDB Take 1 Cap by mouth daily.  lidocaine-prilocaine (EMLA) topical cream Apply small amount over port area one hour before chemo treatment and cover with a Band-Aid    Liraglutide (VICTOZA) 0.6 mg/0.1 mL (18 mg/3 mL) pnij 1.8 mg by SubCUTAneous route daily (with lunch).  SYMBICORT 160-4.5 mcg/actuation HFAA Take 2 Puffs by inhalation two (2) times daily as needed.  clonazePAM (KLONOPIN) 0.5 mg tablet Take 0.5 mg by mouth nightly as needed.  sertraline (ZOLOFT) 50 mg tablet TAKE 1 TABLET BY MOUTH EVERY MORNING    cholecalciferol, VITAMIN D3, (VITAMIN D3) 5,000 unit tab tablet Take 5,000 Units by mouth daily.  furosemide (LASIX) 40 mg tablet Take 40 mg by mouth every other day. No current facility-administered medications for this visit. FH: Her family history includes Anesth Problems in her paternal grandmother and son; Arthritis-osteo in her brother, father, and mother; Cancer in her brother and father; Diabetes in her maternal grandmother; Elevated Lipids in her brother and brother; Hypertension in her brother, brother, father, and mother; Lung Disease in her father; Obesity in her brother. SH: . Disabled. Former nurse, who does volunteer work and foster parenting. She reports that  has never smoked. she has never used smokeless tobacco. She reports that she drinks alcohol. She reports that she does not use drugs. ROS: See above; Complete ROS otherwise negative. OBJECTIVE:   Vitals:   Visit Vitals  /74 (BP 1 Location: Left arm, BP Patient Position: Sitting)   Pulse 89   Temp 98.8 °F (37.1 °C) (Oral)   Resp 16   Ht 5' 9\" (1.753 m)   Wt 321 lb 6.4 oz (145.8 kg)   LMP 09/07/2011   SpO2 96%   BMI 47.46 kg/m²      Gen: Pleasant 63 y.o.  female in NAD. HEENT: PERRLA. EOMI. OP moist and pink. Neck: Supple. No LAD. HEART: RRR, No M/G/R.    LUNGS: CTAB No W/R. ABDOMEN: S, NT, ND, BS+. EXTREMITIES: Warm. 2-3+ edema, L > R.  MUSCULOSKELETAL: Normal ROM, muscle strength 5/5 all groups. NEURO: Alert and oriented x 3. Cranial nerves grossly intact. No focal sensory or motor deficits noted. SKIN: Warm. Dry. No rashes or other lesions noted. Lab Results   Component Value Date/Time    Sodium 142 12/13/2018 08:31 AM    Potassium 5.4 (H) 12/13/2018 08:31 AM    Chloride 108 (H) 12/13/2018 08:31 AM    CO2 21 12/13/2018 08:31 AM    Anion gap 8 11/29/2018 04:42 AM    Glucose 122 (H) 12/13/2018 08:31 AM    BUN 28 (H) 12/13/2018 08:31 AM    Creatinine 1.34 (H) 12/13/2018 08:31 AM    BUN/Creatinine ratio 21 12/13/2018 08:31 AM    GFR est AA 51 (L) 12/13/2018 08:31 AM    GFR est non-AA 44 (L) 12/13/2018 08:31 AM    Calcium 9.4 12/13/2018 08:31 AM    Bilirubin, total 0.3 12/13/2018 08:31 AM    ALT (SGPT) 20 12/13/2018 08:31 AM    AST (SGOT) 32 12/13/2018 08:31 AM    Alk.  phosphatase 86 12/13/2018 08:31 AM    Protein, total 8.3 12/13/2018 08:31 AM    Albumin 4.0 12/13/2018 08:31 AM    Globulin 5.2 (H) 11/27/2018 05:19 AM    A-G Ratio 0.9 (L) 12/13/2018 08:31 AM       Lab Results   Component Value Date/Time    Cholesterol, total 216 (H) 03/28/2018 11:47 AM    HDL Cholesterol 39 (L) 03/28/2018 11:47 AM    LDL, calculated 142 (H) 03/28/2018 11:47 AM    Triglyceride 173 (H) 03/28/2018 11:47 AM        Lab Results   Component Value Date/Time    Hemoglobin A1c 5.5 11/27/2018 12:19 PM       Lab Results   Component Value Date/Time    WBC 8.0 12/13/2018 08:31 AM    Hemoglobin (POC) 10.5 (L) 03/20/2017 12:15 PM    HGB 8.9 (L) 12/13/2018 08:31 AM    Hematocrit (POC) 31 (L) 03/20/2017 12:15 PM    HCT 29.2 (L) 12/13/2018 08:31 AM    PLATELET 857 00/39/7766 08:31 AM    MCV 90 12/13/2018 08:31 AM         ASSESSMENT/ PLAN:     1. Type 2 diabetes mellitus with hyperglycemia, with long-term current use of insulin ([facility]): Controlled. Retinopathy--per ophthalmologist  -     insulin detemir (LEVEMIR FLEXTOUCH) 100 unit/mL (3 mL) inpn; INJECT 30 UNITS IN AM AND 50 UNITS AT NIGHT. INCREASE AS DIRECTED TO  UNITS PER DAY--CALL [phone] FOR APPOINTMENT FOR MORE REFILLS  -     insulin aspart (NOVOLOG FLEXPEN) 100 unit/mL inpn; INJECT 20 UNITS BEFORE EACH MEAL + 4 UNITS FOR EVERY 50 MG/DL ABOVE 150 MG/DL.  UNITS PER DAY  -     Liraglutide (VICTOZA) 0.6 mg/0.1 mL (18 mg/3 mL) pnij; 1.8 mg by SubCUTAneous route daily (with lunch). -     METABOLIC PANEL, COMPREHENSIVE; Future  -     LIPID PANEL; Future  -     CBC WITH AUTOMATED DIFF; Future  -     HEMOGLOBIN A1C WITH EAG; Future    2. Malignant neoplasm of ovary, unspecified laterality ([facility]): Following with gyn oncology.   -     REFERRAL TO GYNECOLOGY  -     METABOLIC PANEL, COMPREHENSIVE; Future  -     CBC WITH AUTOMATED DIFF; Future  -     CANCER ANTIGEN 125; Future    3. Benign essential hypertension: BP fine today. -     METABOLIC PANEL, COMPREHENSIVE; Future  -     CBC WITH AUTOMATED DIFF; Future    4. Disorder of thyroid: Continue levothyroxine.   -     TSH 3RD GENERATION; Future  -     T4, FREE; Future    5. Pulmonary hypertension, mild    6. Hx of pulmonary embolus during pregnancy    7. Dyslipidemia (high LDL; low HDL)  -     METABOLIC PANEL, COMPREHENSIVE; Future    8. Gastroesophageal reflux disease, esophagitis presence not specified  -     Dexlansoprazole (DEXILANT) 60 mg CpDB; Take 1 Cap by mouth daily. Follow-up Disposition:  Return in about 6 months (around 7/15/2019) for DM.      I have reviewed the patient's medications and risks/side effects/benefits were discussed. Diagnosis(-es) explained to patient and questions answered. Literature provided where appropriate.

## 2019-01-17 ENCOUNTER — TELEPHONE (OUTPATIENT)
Dept: GYNECOLOGY | Age: 59
End: 2019-01-17

## 2019-01-17 DIAGNOSIS — C56.9 MALIGNANT NEOPLASM OF OVARY, UNSPECIFIED LATERALITY (HCC): Primary | ICD-10-CM

## 2019-01-17 NOTE — TELEPHONE ENCOUNTER
Pt states she will receive Zejula 1/18/19. She is request lab req to be faxed to [facility] on [address] and she will get labs 1/21/19 and start parpi on 1/23/19 after receiving her lab results.

## 2019-01-21 RX ORDER — LANCETS
EACH MISCELLANEOUS
COMMUNITY
End: 2019-01-21 | Stop reason: SDUPTHER

## 2019-01-21 NOTE — TELEPHONE ENCOUNTER
Pt requesting order for \"test strips and lancets\" for glucose monitor sent to [facility] pharmacy. Best contact [phone].        Message [facility] & [facility]

## 2019-01-22 ENCOUNTER — TELEPHONE (OUTPATIENT)
Dept: GYNECOLOGY | Age: 59
End: 2019-01-22

## 2019-01-22 LAB
ALBUMIN SERPL-MCNC: 4.3 G/DL (ref 3.5–5.5)
ALBUMIN/GLOB SERPL: 1 {RATIO} (ref 1.2–2.2)
ALP SERPL-CCNC: 80 IU/L (ref 39–117)
ALT SERPL-CCNC: 28 IU/L (ref 0–32)
AST SERPL-CCNC: 48 IU/L (ref 0–40)
BASOPHILS # BLD AUTO: 0 X10E3/UL (ref 0–0.2)
BASOPHILS NFR BLD AUTO: 0 %
BILIRUB SERPL-MCNC: 0.2 MG/DL (ref 0–1.2)
BUN SERPL-MCNC: 36 MG/DL (ref 6–24)
BUN/CREAT SERPL: 24 (ref 9–23)
CALCIUM SERPL-MCNC: 9.6 MG/DL (ref 8.7–10.2)
CANCER AG125 SERPL-ACNC: 344.6 U/ML (ref 0–38.1)
CHLORIDE SERPL-SCNC: 108 MMOL/L (ref 96–106)
CO2 SERPL-SCNC: 19 MMOL/L (ref 20–29)
CREAT SERPL-MCNC: 1.47 MG/DL (ref 0.57–1)
EOSINOPHIL # BLD AUTO: 0.4 X10E3/UL (ref 0–0.4)
EOSINOPHIL NFR BLD AUTO: 4 %
ERYTHROCYTE [DISTWIDTH] IN BLOOD BY AUTOMATED COUNT: 15.1 % (ref 12.3–15.4)
GLOBULIN SER CALC-MCNC: 4.3 G/DL (ref 1.5–4.5)
GLUCOSE SERPL-MCNC: 116 MG/DL (ref 65–99)
HCT VFR BLD AUTO: 27.9 % (ref 34–46.6)
HGB BLD-MCNC: 9 G/DL (ref 11.1–15.9)
IMM GRANULOCYTES # BLD AUTO: 0 X10E3/UL (ref 0–0.1)
IMM GRANULOCYTES NFR BLD AUTO: 0 %
LYMPHOCYTES # BLD AUTO: 2 X10E3/UL (ref 0.7–3.1)
LYMPHOCYTES NFR BLD AUTO: 24 %
MAGNESIUM SERPL-MCNC: 1.9 MG/DL (ref 1.6–2.3)
MCH RBC QN AUTO: 27.8 PG (ref 26.6–33)
MCHC RBC AUTO-ENTMCNC: 32.3 G/DL (ref 31.5–35.7)
MCV RBC AUTO: 86 FL (ref 79–97)
MONOCYTES # BLD AUTO: 0.9 X10E3/UL (ref 0.1–0.9)
MONOCYTES NFR BLD AUTO: 11 %
NEUTROPHILS # BLD AUTO: 4.9 X10E3/UL (ref 1.4–7)
NEUTROPHILS NFR BLD AUTO: 61 %
PLATELET # BLD AUTO: 226 X10E3/UL (ref 150–379)
POTASSIUM SERPL-SCNC: 5.3 MMOL/L (ref 3.5–5.2)
PROT SERPL-MCNC: 8.6 G/DL (ref 6–8.5)
RBC # BLD AUTO: 3.24 X10E6/UL (ref 3.77–5.28)
SODIUM SERPL-SCNC: 141 MMOL/L (ref 134–144)
WBC # BLD AUTO: 8.2 X10E3/UL (ref 3.4–10.8)

## 2019-01-22 NOTE — TELEPHONE ENCOUNTER
I called pt with lab results drawn on 1/21/19, and informed her per Dr. Jeffery Gallego she needs to schedule a scan, and can start Zejula 100mg 2 tabs daily. I will call pt back with lab/MD appts.

## 2019-01-23 ENCOUNTER — TELEPHONE (OUTPATIENT)
Dept: GYNECOLOGY | Age: 59
End: 2019-01-23

## 2019-01-23 DIAGNOSIS — C56.3 MALIGNANT NEOPLASM OF BOTH OVARIES (HCC): Primary | ICD-10-CM

## 2019-01-23 DIAGNOSIS — C80.0 CARCINOMATOSIS (HCC): ICD-10-CM

## 2019-01-23 NOTE — TELEPHONE ENCOUNTER
Pt calling to request her schedule for scan and f/u with Dr. Micki Bowen to possibly start IV chemotherapy versus her parpi which arrived this weekend d/t ca125 elevating.

## 2019-01-24 RX ORDER — LANCETS
EACH MISCELLANEOUS DAILY
Qty: 30 EACH | Refills: 5 | Status: ON HOLD | OUTPATIENT
Start: 2019-01-24 | End: 2021-01-01

## 2019-01-29 ENCOUNTER — HOSPITAL ENCOUNTER (OUTPATIENT)
Dept: CT IMAGING | Age: 59
Discharge: HOME | End: 2019-01-29
Attending: OBSTETRICS & GYNECOLOGY

## 2019-01-29 ENCOUNTER — TELEPHONE (OUTPATIENT)
Dept: GYNECOLOGY | Age: 59
End: 2019-01-29

## 2019-01-29 DIAGNOSIS — C56.3 MALIGNANT NEOPLASM OF BOTH OVARIES (HCC): ICD-10-CM

## 2019-01-29 DIAGNOSIS — C80.0 CARCINOMATOSIS (HCC): ICD-10-CM

## 2019-01-29 RX ORDER — SODIUM CHLORIDE 0.9 % (FLUSH) 0.9 %
10 SYRINGE (ML) INJECTION
Status: DISPENSED | OUTPATIENT
Start: 2019-01-29 | End: 2019-01-29

## 2019-01-31 ENCOUNTER — TELEPHONE (OUTPATIENT)
Dept: GYNECOLOGY | Age: 59
End: 2019-01-31

## 2019-01-31 DIAGNOSIS — Z91.041 CONTRAST MEDIA ALLERGY: Primary | ICD-10-CM

## 2019-01-31 DIAGNOSIS — C56.9 MALIGNANT NEOPLASM OF OVARY, UNSPECIFIED LATERALITY (HCC): Primary | ICD-10-CM

## 2019-01-31 RX ORDER — PREDNISONE 10 MG/1
TABLET ORAL
Qty: 15 TAB | Refills: 0 | Status: SHIPPED | OUTPATIENT
Start: 2019-01-31 | End: 2019-03-01

## 2019-01-31 RX ORDER — PREDNISONE 20 MG/1
20 TABLET ORAL
Refills: 0 | Status: CANCELLED | OUTPATIENT
Start: 2019-01-31

## 2019-01-31 NOTE — TELEPHONE ENCOUNTER
Lab req faxed to [facility] to be drawn 2/1/19 per vo Dr. Stefan Ferguson, and pt request.

## 2019-01-31 NOTE — TELEPHONE ENCOUNTER
Pt called with c/o constipation for 3 days since starting Ravinder Section on 1/23/19 and has taken otc generic stool softner X1. Pt advised to increase fluid intake, may take Senna, and Miralax per vo Dr. Veronika Chao. I will schedule port flush/labs weekly for 4 weeks since starting Zejula.

## 2019-02-05 ENCOUNTER — TELEPHONE (OUTPATIENT)
Dept: GYNECOLOGY | Age: 59
End: 2019-02-05

## 2019-02-05 ENCOUNTER — HOSPITAL ENCOUNTER (OUTPATIENT)
Dept: CT IMAGING | Age: 59
Discharge: HOME | End: 2019-02-05
Attending: OBSTETRICS & GYNECOLOGY
Payer: MEDICARE

## 2019-02-05 DIAGNOSIS — C56.3 MALIGNANT NEOPLASM OF BOTH OVARIES (HCC): ICD-10-CM

## 2019-02-05 DIAGNOSIS — C80.0 CARCINOMATOSIS (HCC): ICD-10-CM

## 2019-02-05 LAB
ALBUMIN SERPL-MCNC: 4.1 G/DL (ref 3.5–5.5)
ALBUMIN/GLOB SERPL: 0.9 {RATIO} (ref 1.2–2.2)
ALP SERPL-CCNC: 86 IU/L (ref 39–117)
ALT SERPL-CCNC: 19 IU/L (ref 0–32)
AST SERPL-CCNC: 38 IU/L (ref 0–40)
BASOPHILS # BLD AUTO: 0 X10E3/UL (ref 0–0.2)
BASOPHILS NFR BLD AUTO: 0 %
BILIRUB SERPL-MCNC: 0.2 MG/DL (ref 0–1.2)
BUN SERPL-MCNC: 26 MG/DL (ref 6–24)
BUN/CREAT SERPL: 16 (ref 9–23)
CALCIUM SERPL-MCNC: 9.7 MG/DL (ref 8.7–10.2)
CANCER AG125 SERPL-ACNC: 393.6 U/ML (ref 0–38.1)
CHLORIDE SERPL-SCNC: 107 MMOL/L (ref 96–106)
CO2 SERPL-SCNC: 19 MMOL/L (ref 20–29)
CREAT SERPL-MCNC: 1.66 MG/DL (ref 0.57–1)
EOSINOPHIL # BLD AUTO: 0.3 X10E3/UL (ref 0–0.4)
EOSINOPHIL NFR BLD AUTO: 5 %
ERYTHROCYTE [DISTWIDTH] IN BLOOD BY AUTOMATED COUNT: 14.5 % (ref 12.3–15.4)
GLOBULIN SER CALC-MCNC: 4.4 G/DL (ref 1.5–4.5)
GLUCOSE SERPL-MCNC: 107 MG/DL (ref 65–99)
HCT VFR BLD AUTO: 30.4 % (ref 34–46.6)
HGB BLD-MCNC: 9.5 G/DL (ref 11.1–15.9)
IMM GRANULOCYTES # BLD AUTO: 0 X10E3/UL (ref 0–0.1)
IMM GRANULOCYTES NFR BLD AUTO: 0 %
LYMPHOCYTES # BLD AUTO: 1.9 X10E3/UL (ref 0.7–3.1)
LYMPHOCYTES NFR BLD AUTO: 27 %
MAGNESIUM SERPL-MCNC: 1.8 MG/DL (ref 1.6–2.3)
MCH RBC QN AUTO: 28.2 PG (ref 26.6–33)
MCHC RBC AUTO-ENTMCNC: 31.3 G/DL (ref 31.5–35.7)
MCV RBC AUTO: 90 FL (ref 79–97)
MONOCYTES # BLD AUTO: 0.7 X10E3/UL (ref 0.1–0.9)
MONOCYTES NFR BLD AUTO: 10 %
NEUTROPHILS # BLD AUTO: 4.1 X10E3/UL (ref 1.4–7)
NEUTROPHILS NFR BLD AUTO: 58 %
PLATELET # BLD AUTO: 201 X10E3/UL (ref 150–379)
POTASSIUM SERPL-SCNC: 5.4 MMOL/L (ref 3.5–5.2)
PROT SERPL-MCNC: 8.5 G/DL (ref 6–8.5)
RBC # BLD AUTO: 3.37 X10E6/UL (ref 3.77–5.28)
SODIUM SERPL-SCNC: 142 MMOL/L (ref 134–144)
WBC # BLD AUTO: 7.1 X10E3/UL (ref 3.4–10.8)

## 2019-02-05 PROCEDURE — 74176 CT ABD & PELVIS W/O CONTRAST: CPT

## 2019-02-05 PROCEDURE — 71250 CT THORAX DX C-: CPT

## 2019-02-05 NOTE — TELEPHONE ENCOUNTER
Per Dr. Maria E Lewis pt informed to continue her Nam Unger, and they would review scan and medications with her appt on 2/7/19.

## 2019-02-05 NOTE — TELEPHONE ENCOUNTER
I spoke with patient, she states her creatinine level was elevated at her CT scan and they were unable to use contrast, she wants to know if she should still take the Rosalba Sprawls?   Please call at [phone]

## 2019-02-07 ENCOUNTER — OFFICE VISIT (OUTPATIENT)
Dept: GYNECOLOGY | Age: 59
End: 2019-02-07

## 2019-02-07 ENCOUNTER — HOSPITAL ENCOUNTER (OUTPATIENT)
Dept: INFUSION THERAPY | Age: 59
Discharge: HOME | End: 2019-02-07
Payer: MEDICARE

## 2019-02-07 VITALS
SYSTOLIC BLOOD PRESSURE: 157 MMHG | DIASTOLIC BLOOD PRESSURE: 91 MMHG | BODY MASS INDEX: 46.96 KG/M2 | HEART RATE: 76 BPM | WEIGHT: 293 LBS

## 2019-02-07 VITALS
HEART RATE: 81 BPM | RESPIRATION RATE: 18 BRPM | TEMPERATURE: 97.1 F | DIASTOLIC BLOOD PRESSURE: 85 MMHG | SYSTOLIC BLOOD PRESSURE: 150 MMHG

## 2019-02-07 DIAGNOSIS — R59.0 LYMPHADENOPATHY, MEDIASTINAL: ICD-10-CM

## 2019-02-07 DIAGNOSIS — R22.2 CHEST WALL MASS: ICD-10-CM

## 2019-02-07 DIAGNOSIS — G89.3 CANCER ASSOCIATED PAIN: ICD-10-CM

## 2019-02-07 DIAGNOSIS — E66.01 MORBID OBESITY (HCC): ICD-10-CM

## 2019-02-07 DIAGNOSIS — N13.1 HYDRONEPHROSIS DUE TO OBSTRUCTION OF URETER: Primary | ICD-10-CM

## 2019-02-07 DIAGNOSIS — C80.0 CARCINOMATOSIS (HCC): ICD-10-CM

## 2019-02-07 DIAGNOSIS — C56.9 MALIGNANT NEOPLASM OF OVARY, UNSPECIFIED LATERALITY (HCC): ICD-10-CM

## 2019-02-07 PROCEDURE — 77030012965 HC NDL HUBR BBMI -A

## 2019-02-07 PROCEDURE — 36591 DRAW BLOOD OFF VENOUS DEVICE: CPT

## 2019-02-07 RX ORDER — OXYCODONE HYDROCHLORIDE 5 MG/1
5 TABLET ORAL
Qty: 45 TAB | Refills: 0 | Status: SHIPPED | OUTPATIENT
Start: 2019-02-07 | End: 2019-12-04 | Stop reason: SDUPTHER

## 2019-02-07 RX ORDER — NIRAPARIB 100 MG/1
CAPSULE ORAL
COMMUNITY
Start: 2019-01-17 | End: 2019-03-01

## 2019-02-07 NOTE — PROGRESS NOTES
[address] [place] [address] Mathias Moritz [address], [address]  P [phone]  F [phone]    Office Visit    Patient ID:  Name: Juarez Flores  MRN: [id number]  : 1960/58 y.o. Visit date: 2019    LEONID Hurtado is a 63 y.o.  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 The patient's previous treatment procedures include primary Taxol/carbo with retreatment in  and Doxil/Avastin, topotecan, gemzar and now single agent weekly Taxol initiated 18 following progression with chest wall involvement. In 2018, patient did not follow up and self-discontinued her treatment regimen. She presented to the ER on 18 with SOB. Negative workup for PE or MI. Admitted to Hospitalist service from -18. Managed for acute asthma exacerbation, acute FELICIA, and hyperkalemia. The patient was initiated on Verneice Bass on 19. Underwent repeat CT C/A/P on 19 for purposes of following response to treatment. Patient is tolerating Verneice Bass well for the first month. Denies current shortness of breath. Does report some pain in her chest where the mass is located. Denies cough or hemoptysis. Denies nausea, vomiting, constipation or diarrhea. Had some constipation when she started Verneice Bass, but this has resolved with stool softeners. Denies fevers or chills. Does report that her appetite is a little less.       OncTx History:  12: MARAH/BSO/Cytoreductive surgery on 12 noted carcinomatosis/omental caking.    10/2012-2013: 6 cycles Carbo/Taxol  2014-2014: 6 Cycles carbo/Taxol  3/2015-5/13/15: 3 cycles Avastin/Doxil until progression  2015-2015 Weekly Topotecan   2015-3/2016: Weekly Topotecan  2017-2017:  Gemzar D1/D8 Q28 days for 6 cycles  2018: CT core needle bx chest wall mass: Metastatic high-grade serous carcinoma (see comment)   General Categorization Positive for malignancy. 5/8/2018-Present: Taxol  C6,9,88; S/P  Cycle #4  8/14/2018. Patient discontinued treatment secondary to side effects and fatigue. 1/23/2019 - Rodriguez Vuong    Imaging Review:   CT C/A/P 2/5/19:  FINDINGS:   THYROID: No nodule. MEDIASTINUM: Mediastinal lymphadenopathy is unchanged. A reference superior  mediastinal lymph node is stable measuring 3.3 cm. SWATI: No mass or lymphadenopathy. THORACIC AORTA: No dissection or aneurysm. MAIN PULMONARY ARTERY: Normal in caliber. TRACHEA/BRONCHI: Patent. ESOPHAGUS: No wall thickening or dilatation. HEART: Normal in size. PLEURA: No effusion or pneumothorax. LUNGS: No nodule, mass, or airspace disease. LIVER: Evaluation of the liver is limited by lack of intravenous contrast. There  is a vague 4.4 cm hypodense lesion in the left hepatic lobe previously measuring  3.0 cm. Lesion in the right hepatic lobe now measures 3.3 cm, previously  measuring 2.7 cm. GALLBLADDER: Unremarkable. SPLEEN: Central soft tissue abnormality has increased in size, now measuring 4.9  cm, previously measuring 3.1 cm. PANCREAS: Not well evaluated given the massive lymphadenopathy and lack of  intravenous contrast.  ADRENALS: Unremarkable. KIDNEYS: There is no moderate right hydronephrosis. STOMACH: Unremarkable. SMALL BOWEL: No dilatation or wall thickening. COLON: No dilatation or wall thickening. APPENDIX: Not visualized. PERITONEUM: Bulky gastrohepatic, periceliac, portacaval, and mesenteric  lymphadenopathy has increased. There is secondary invasion of the liver. Maximum  dimension in the upper abdomen is currently 10.4 cm, previously 9.3 cm. Elemental soft tissue nodules have increased in size, with a reference 7.0 cm  nodule in the left abdomen previously measuring 5.5 cm. Soft tissue nodule in  the deep pelvis between the bladder and colon has increased in size as well. RETROPERITONEUM: No lymphadenopathy or aortic aneurysm.   REPRODUCTIVE ORGANS: The uterus and ovaries are surgically absent. URINARY BLADDER: No mass or calculus. BONES: Soft tissue mass involving the right sternum has not changed. Degenerative changes are seen in the thoracic and lumbar spine. ADDITIONAL COMMENTS: N/A  IMPRESSION  IMPRESSION:  Stable mediastinal lymphadenopathy. Progressive abdominal lymphadenopathy as  described above. Liver lesions have increased in size compared to the prior  exam. Stable right sternal mass. Moderate right hydroureteronephrosis is now  noted which appears to be related to the lymphadenopathy. ROS  Constitutional: no weight loss, fever, night sweats  Respiratory: no cough, shortness of breath, or wheezing  Cardiovascular: no chest pain or dyspnea on exertion. Reports chest wall mass smaller, no associated pain. Heme: No abnormal bleeding  Gastrointestinal: no abdominal pain, change in bowel habits, or black or bloody stools  Genito-Urinary: no dysuria, trouble voiding, or hematuria  Musculoskeletal: + swelling in ankle - bilateral, mostly only at end of day  Neurological: mild neuropathy  Derm: pigmentation/induration medial left leg. Prior injury from fall. Psych: positive for depression - controlled        PMH:  Past Medical History:   Diagnosis Date    Adverse effect of anesthesia     sleep apnea cpap machine useage     Anemia     Arthritis     DDD low back and knees    Asthma     uses albuterol prn only; none x 6 mos.   Never hospitalized    BRCA negative 1/2013    Calculus of kidney     Chronic kidney disease     kidney stones    Chronic pain     knee pain bilat    CINV (chemotherapy-induced nausea and vomiting) 8/21/2014    Decreased hearing, right     PATIENT STATES THIS IS DUE TO CHEMOTHERAPY    Diabetes ([facility] [address] Age 46    IDDM    Environmental allergies     Fibromyalgia     GERD (gastroesophageal reflux disease)     controlled with med    Hx of pulmonary embolus during pregnancy 1/18/2015    Hypertension     Ill-defined condition     Sepsis  -  SOURCE PORT    Ill-defined condition 2016    chemotherapy     Mass of chest wall 2018    Morbid obesity (HCC)     Murmur, heart     Other and unspecified hyperlipidemia     Ovarian cancer ([facility] [address] 2012, 2014    serous, high grade, stage IIIc. s/p chemotx (has port)    Psychiatric disorder     Rectal bleeding     Thromboembolus ([facility] [address]     POST PARTUM; resolved- PELVIS    Thyroid disease     HYPOTHYROID    Unspecified sleep apnea     CPAP, Dr. Dhiraj Johnson     PSH:  Past Surgical History:   Procedure Laterality Date    BREAST SURGERY PROCEDURE UNLISTED      Lymph node in left breast 2017    CHEST SURGERY PROCEDURE UNLISTED      Placement of port a cath right chest    HX GYN       X2    HX HYSTERECTOMY  2012    ROULA/BSO, tumor debulking, omentectomy for ovarian cancer    HX ORTHOPAEDIC      ankle-FX, R    HX ORTHOPAEDIC      FX R ARM    HX UROLOGICAL      stent    HX VASCULAR ACCESS  2015    right chest- port placed     SOC:  Social History     Socioeconomic History    Marital status:      Spouse name: Not on file    Number of children: Not on file    Years of education: Not on file    Highest education level: Not on file   Social Needs    Financial resource strain: Not on file    Food insecurity - worry: Not on file    Food insecurity - inability: Not on file   [employer] needs - medical: Not on file   [employer] needs - non-medical: Not on file   Occupational History    Not on file   Tobacco Use    Smoking status: Never Smoker    Smokeless tobacco: Never Used   Substance and Sexual Activity    Alcohol use: Yes     Comment: 2 DRINKS EVERY 2 MONTHS    Drug use: No    Sexual activity: Yes   Other Topics Concern    Not on file   Social History Narrative    Lives in [address] [place] [address] alone.  passed away in  of a heart attack. Has 2 sons. Disability. 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 Enjoys swimming and going to the gym. Family History  Family History   Problem Relation Age of Onset    Hypertension Mother     Arthritis-osteo Mother     Lung Disease Father         COPD    Hypertension Father     Arthritis-osteo Father     Cancer Father         PROSTATE    Hypertension Brother     Elevated Lipids Brother     Arthritis-osteo Brother     Hypertension Brother     Elevated Lipids Brother     Obesity Brother     Cancer Brother         PROSTATE    Anesth Problems Son         DELAYED AWAKENING    Diabetes Maternal Grandmother     Anesth Problems Paternal Grandmother         [address] STOPPED DURING SURGERY     Medications: (reviewed)  Current Outpatient Medications on File Prior to Visit   Medication Sig Dispense Refill    glucose blood VI test strips (TRUE METRIX GLUCOSE TEST STRIP) strip by Does Not Apply route See Admin Instructions. 30 Strip 5    lancets misc by Does Not Apply route daily. True Metrix lancets- test blood sugar once per day 30 Each 5    Blood-Glucose Meter monitoring kit Check up to tid, as directed 1 Kit 0    LINZESS 145 mcg cap capsule TAKE 1 CAPSULE BY MOUTH EVERY DAYY 30 Cap 0    levothyroxine (SYNTHROID) 150 mcg tablet Take 1 Tab by mouth Daily (before breakfast). 90 Tab 0    ondansetron (ZOFRAN ODT) 4 mg disintegrating tablet Take 1 Tab by mouth every eight (8) hours as needed for Nausea. 30 Tab 2    azelastine (ASTELIN) 137 mcg (0.1 %) nasal spray 1 Amigo by Both Nostrils route daily. Use in each nostril as directed       insulin detemir U-100 (LEVEMIR) 100 unit/mL injection 20 Units by SubCUTAneous route daily (with lunch).  linaclotide (LINZESS) 145 mcg cap capsule Take 145 mcg by mouth daily as needed.  montelukast (SINGULAIR) 10 mg tablet Take 10 mg by mouth nightly.  ascorbic acid, vitamin C, (VITAMIN C) 1,000 mg tablet Take 1,000 mg by mouth daily.       albuterol (PROVENTIL VENTOLIN) 2.5 mg /3 mL (0.083 %) nebulizer solution 2.5 mg by Nebulization route every four (4) hours as needed for Wheezing.  potassium chloride (KLOR-CON M10) 10 mEq tablet TAKE 1 TAB BY MOUTH TWO (2) DAYS A WEEK. WED AND FRI 90 Tab 1    telmisartan (MICARDIS) 20 mg tablet Take 1 Tab by mouth daily. 90 Tab 3    insulin aspart U-100 (NOVOLOG FLEXPEN U-100 INSULIN) 100 unit/mL inpn INJECT 20 UNITS BEFORE EACH MEAL + 4 UNITS FOR EVERY 50 MG/DL ABOVE 150 MG/DL.  UNITS PER DAY 30 mL 11    omega-3 acid ethyl esters (LOVAZA) 1 gram capsule TAKE ONE CAPSULE BY MOUTH TWICE A  Cap 2    Dexlansoprazole (DEXILANT) 60 mg CpDB Take 1 Cap by mouth daily. 90 Cap 3    lidocaine-prilocaine (EMLA) topical cream Apply small amount over port area one hour before chemo treatment and cover with a Band-Aid 30 g 1    Liraglutide (VICTOZA) 0.6 mg/0.1 mL (18 mg/3 mL) pnij 1.8 mg by SubCUTAneous route daily (with lunch). 3 Pen 3    SYMBICORT 160-4.5 mcg/actuation HFAA Take 2 Puffs by inhalation two (2) times daily as needed. 2 Inhaler 3    clonazePAM (KLONOPIN) 0.5 mg tablet Take 0.5 mg by mouth nightly as needed.  sertraline (ZOLOFT) 50 mg tablet TAKE 1 TABLET BY MOUTH EVERY MORNING  1    cholecalciferol, VITAMIN D3, (VITAMIN D3) 5,000 unit tab tablet Take 5,000 Units by mouth daily.  furosemide (LASIX) 40 mg tablet Take 40 mg by mouth every other day. 3    ZEJULA 100 mg cap       predniSONE (DELTASONE) 10 mg tablet Take 5 tablets 13 hours prior to scan, take 5 tablets 7 hours prior to scan and take 5 tablets one hour prior to scan 15 Tab 0     No current facility-administered medications on file prior to visit. Allergies: (reviewed)  Allergies   Allergen Reactions    Carboplatin Other (comments)     Patient developed shortness of breath, felt faint, coughing, skin flushed and \"itchy\". This occurred on the patients 8th treatment.     Iodinated Contrast- Oral And Iv Dye Rash     13 hr pre-medication prior to IV contrast. Patient has done well with 1 hr pre-medication of (Solu-Medrol) 40mg &  (Benadryl) 50mg.  Lipitor [Atorvastatin] Myalgia    Shellfish Containing Products Hives    Tape [Adhesive] Itching and Other (comments)     \"op site-- clear,thin tape\"--caused blister     Tomato Hives    Olmesartan Other (comments)    Losartan Other (comments)     headaches    Percocet [Oxycodone-Acetaminophen] Other (comments)     Fever; however, current home med       OBJECTIVE  Physical Exam  Visit Vitals  BP (!) 157/91 (BP 1 Location: Left arm, BP Patient Position: Sitting)   Pulse 76   Wt 318 lb (144.2 kg)   LMP 09/07/2011   BMI 46.96 kg/m²     Physical Exam:  General: Alert and oriented. No acute distress. Well-nourished  HEENT: No thyroid enlargment. Neck supple without restrictions. Sclera normal. Normal occular motion. Moist mucous membranes. Lymphatics: No evidence of axillary, cervical, or subclavicular adenopathy. Respiratory: clear to auscultation and percussion to the bases. No CVAT. No discernable chest wall mass on palpation, but clear on imaging. Cardiovascular: regular rate and rhythm. No murmurs, rubs, or gallops. Gastrointestinal: soft, non-tender, non-distended, no masses or organomegaly. Well-healed incision. Musculoskeletal: normal gait. No joint tenderness, deformity or swelling. No muscular tenderness. Extremities: extremities normal, atraumatic, mild 1+ edema bilaterally. Pelvic: deferred today  Neuro: Grossly intact. Normal gait and movement. No acute deficit  Skin: No evidence of rashes or skin changes. DATE REVIEW as available:  Lab Results   Component Value Date/Time    WBC 7.1 02/04/2019 02:53 PM    Hemoglobin (POC) 10.5 (L) 03/20/2017 12:15 PM    HGB 9.5 (L) 02/04/2019 02:53 PM    Hematocrit (POC) 31 (L) 03/20/2017 12:15 PM    HCT 30.4 (L) 02/04/2019 02:53 PM    PLATELET 308 99/74/1831 02:53 PM    MCV 90 02/04/2019 02:53 PM     Lab Results   Component Value Date/Time    ABS.  NEUTROPHILS 4.1 02/04/2019 02:53 PM     Lab Results   Component Value Date/Time    Sodium 142 02/04/2019 02:53 PM    Potassium 5.4 (H) 02/04/2019 02:53 PM    Chloride 107 (H) 02/04/2019 02:53 PM    CO2 19 (L) 02/04/2019 02:53 PM    Anion gap 8 11/29/2018 04:42 AM    Glucose 107 (H) 02/04/2019 02:53 PM    BUN 26 (H) 02/04/2019 02:53 PM    Creatinine 1.66 (H) 02/04/2019 02:53 PM    BUN/Creatinine ratio 16 02/04/2019 02:53 PM    GFR est AA 39 (L) 02/04/2019 02:53 PM    GFR est non-AA 34 (L) 02/04/2019 02:53 PM    Calcium 9.7 02/04/2019 02:53 PM    Bilirubin, total 0.2 02/04/2019 02:53 PM    AST (SGOT) 38 02/04/2019 02:53 PM    Alk. phosphatase 86 02/04/2019 02:53 PM    Protein, total 8.5 02/04/2019 02:53 PM    Albumin 4.1 02/04/2019 02:53 PM    Globulin 5.2 (H) 11/27/2018 05:19 AM    A-G Ratio 0.9 (L) 02/04/2019 02:53 PM    ALT (SGPT) 19 02/04/2019 02:53 PM       ECHO 7/17/18  Left ventricle: Systolic function was normal. Ejection fraction was estimated in the range of 55 % to 60 %. There were no regional wall motion  abnormalities. Wall thickness was mildly increased. Left atrium: The atrium was mildly dilated. Mitral valve: There was mild regurgitation. Aortic valve: Leaflets exhibited sclerosis without stenosis. Not well visualized. IMPRESSION AND PLAN:  Ms. Corrie Osborne is a 63 y.o. female with recurrent, metastatic ovarian cancer. Heavily pre-treated. Lost to follow-up since 9/2018, at which time patient self-discontinued weekly paclitaxel. Initiated Rolley Leslie on 1/23/19.  ECOG 1    Problem List Items Addressed This Visit        Immune    Lymphadenopathy, mediastinal       Reproductive    Ovarian cancer ([facility] [address]    Relevant Medications    ZEJULA 100 mg cap    oxyCODONE IR (ROXICODONE) 5 mg immediate release tablet    Other Relevant Orders    REFERRAL TO UROLOGY       Other    Carcinomatosis ([address]    Relevant Medications    ZEJULA 100 mg cap    oxyCODONE IR (ROXICODONE) 5 mg immediate release tablet    Morbid obesity ([address] Chest wall mass      Other Visit Diagnoses     Hydronephrosis due to obstruction of ureter    -  Primary    Relevant Orders    REFERRAL TO UROLOGY    Cancer associated pain        Relevant Medications    oxyCODONE IR (ROXICODONE) 5 mg immediate release tablet            Reviewed patient's course to date, including her recent CT C/A/P on 2/5/19. Will use this as her baseline scan to measure response. Initiated Lashandadaniel Clement on 1/23/19. Slight rise in creatinine from 1.4 to 1.6. Will continue to monitor. There is mild right hydronephrosis. Referral placed to Urology for consideration of outpatient placement of ureteral stent. Continue Zejula at this time. While the patient is BRCA negative, there is some evidence that PARPi may provide some benefit to BRCA wild-type patients. Again, I discussed goals of care, including that any type of treatment at this time is palliative in nature. Counseled patient regarding code status and advance care directives, which the patient is going to consider. She reports she has a \"living will\", but I am unsure as to her wishes regarding code status. Will plan to continue this dialog at future visits. All questions and concerns were addressed with the patient and she is comfortable with the plan.     Ro Rangel MD

## 2019-02-07 NOTE — PROGRESS NOTES
Outpatient Infusion Center - Chemotherapy Progress Note [id number] [phone] Pt admit to [facility] for port flush/labs ambulatory in stable condition. Assessment completed. No new concerns voiced. Port accessed with positive blood return. Labs drawn per order and sent. Line flushed, heparinized and de-accessed. Visit Vitals /85 Pulse 81 Temp 97.1 °F (36.2 °C) Resp 18 LMP 09/07/2011 Breastfeeding? No  
 
 
Medications: 
NS flush Heparin flush 1430 Pt tolerated treatment well. D/c home ambulatory in no distress. Pt aware of next [facility] appointment scheduled for 02/14/2019. Please review labs in CC once resulted.

## 2019-02-07 NOTE — PROGRESS NOTES
Ct scan results, pt has been taking Zejula for the past 2 weeks, 200 mg per day, pt reports discomfort in her chest, 3/10 on pain scale    Initial blood pressure reading 164/92, repeat blood pressure reading 157/91

## 2019-02-08 ENCOUNTER — DOCUMENTATION ONLY (OUTPATIENT)
Dept: GYNECOLOGY | Age: 59
End: 2019-02-08

## 2019-02-08 NOTE — PROGRESS NOTES
Per Josiane Cowan RN with [facility], labs were drawn and sent on pt on 2/7/19. I called d/t results not being posted and Tiffany Byrd called lab and lab told her they were never received.

## 2019-02-12 ENCOUNTER — TELEPHONE (OUTPATIENT)
Dept: INTERNAL MEDICINE CLINIC | Age: 59
End: 2019-02-12

## 2019-02-12 NOTE — TELEPHONE ENCOUNTER
Patient states she needs a call back asap in reference to getting a Letter to \"Opt Out due to Health\" for [facility]. Please call.  Thank you

## 2019-02-14 ENCOUNTER — HOSPITAL ENCOUNTER (OUTPATIENT)
Dept: INFUSION THERAPY | Age: 59
Discharge: HOME | End: 2019-02-14
Payer: MEDICARE

## 2019-02-14 VITALS
SYSTOLIC BLOOD PRESSURE: 151 MMHG | OXYGEN SATURATION: 96 % | HEART RATE: 89 BPM | TEMPERATURE: 98.8 F | DIASTOLIC BLOOD PRESSURE: 88 MMHG | RESPIRATION RATE: 18 BRPM

## 2019-02-14 LAB
ALBUMIN SERPL-MCNC: 3.2 G/DL (ref 3.5–5)
ALBUMIN/GLOB SERPL: 0.6 {RATIO} (ref 1.1–2.2)
ALP SERPL-CCNC: 97 U/L (ref 45–117)
ALT SERPL-CCNC: 26 U/L (ref 12–78)
ANION GAP SERPL CALC-SCNC: 7 MMOL/L (ref 5–15)
AST SERPL-CCNC: 49 U/L (ref 15–37)
BASOPHILS # BLD: 0 K/UL (ref 0–0.1)
BASOPHILS NFR BLD: 0 % (ref 0–1)
BILIRUB SERPL-MCNC: 0.3 MG/DL (ref 0.2–1)
BUN SERPL-MCNC: 33 MG/DL (ref 6–20)
BUN/CREAT SERPL: 16 (ref 12–20)
CALCIUM SERPL-MCNC: 9 MG/DL (ref 8.5–10.1)
CANCER AG125 SERPL-ACNC: 252 U/ML (ref 1.5–35)
CHLORIDE SERPL-SCNC: 110 MMOL/L (ref 97–108)
CO2 SERPL-SCNC: 21 MMOL/L (ref 21–32)
CREAT SERPL-MCNC: 2.07 MG/DL (ref 0.55–1.02)
DIFFERENTIAL METHOD BLD: ABNORMAL
EOSINOPHIL # BLD: 0.2 K/UL (ref 0–0.4)
EOSINOPHIL NFR BLD: 3 % (ref 0–7)
ERYTHROCYTE [DISTWIDTH] IN BLOOD BY AUTOMATED COUNT: 13.9 % (ref 11.5–14.5)
GLOBULIN SER CALC-MCNC: 5.6 G/DL (ref 2–4)
GLUCOSE SERPL-MCNC: 136 MG/DL (ref 65–100)
HCT VFR BLD AUTO: 28.5 % (ref 35–47)
HGB BLD-MCNC: 8.7 G/DL (ref 11.5–16)
IMM GRANULOCYTES # BLD AUTO: 0 K/UL (ref 0–0.04)
IMM GRANULOCYTES NFR BLD AUTO: 0 % (ref 0–0.5)
LYMPHOCYTES # BLD: 1.5 K/UL (ref 0.8–3.5)
LYMPHOCYTES NFR BLD: 22 % (ref 12–49)
MAGNESIUM SERPL-MCNC: 1.8 MG/DL (ref 1.6–2.4)
MCH RBC QN AUTO: 28.8 PG (ref 26–34)
MCHC RBC AUTO-ENTMCNC: 30.5 G/DL (ref 30–36.5)
MCV RBC AUTO: 94.4 FL (ref 80–99)
MONOCYTES # BLD: 0.8 K/UL (ref 0–1)
MONOCYTES NFR BLD: 12 % (ref 5–13)
NEUTS SEG # BLD: 4.3 K/UL (ref 1.8–8)
NEUTS SEG NFR BLD: 63 % (ref 32–75)
NRBC # BLD: 0 K/UL (ref 0–0.01)
NRBC BLD-RTO: 0 PER 100 WBC
PLATELET # BLD AUTO: 140 K/UL (ref 150–400)
PMV BLD AUTO: 9.5 FL (ref 8.9–12.9)
POTASSIUM SERPL-SCNC: 4.6 MMOL/L (ref 3.5–5.1)
PROT SERPL-MCNC: 8.8 G/DL (ref 6.4–8.2)
RBC # BLD AUTO: 3.02 M/UL (ref 3.8–5.2)
SODIUM SERPL-SCNC: 138 MMOL/L (ref 136–145)
WBC # BLD AUTO: 6.8 K/UL (ref 3.6–11)

## 2019-02-14 PROCEDURE — 83735 ASSAY OF MAGNESIUM: CPT

## 2019-02-14 PROCEDURE — 74011000250 HC RX REV CODE- 250: Performed by: OBSTETRICS & GYNECOLOGY

## 2019-02-14 PROCEDURE — 80053 COMPREHEN METABOLIC PANEL: CPT

## 2019-02-14 PROCEDURE — 85025 COMPLETE CBC W/AUTO DIFF WBC: CPT

## 2019-02-14 PROCEDURE — 86304 IMMUNOASSAY TUMOR CA 125: CPT

## 2019-02-14 PROCEDURE — 74011250636 HC RX REV CODE- 250/636: Performed by: OBSTETRICS & GYNECOLOGY

## 2019-02-14 PROCEDURE — 77030012965 HC NDL HUBR BBMI -A

## 2019-02-14 PROCEDURE — 36415 COLL VENOUS BLD VENIPUNCTURE: CPT

## 2019-02-14 PROCEDURE — 36591 DRAW BLOOD OFF VENOUS DEVICE: CPT

## 2019-02-14 RX ORDER — SODIUM CHLORIDE 0.9 % (FLUSH) 0.9 %
10-40 SYRINGE (ML) INJECTION AS NEEDED
Status: ACTIVE | OUTPATIENT
Start: 2019-02-14 | End: 2019-02-15

## 2019-02-14 RX ORDER — HEPARIN 100 UNIT/ML
500 SYRINGE INTRAVENOUS AS NEEDED
Status: ACTIVE | OUTPATIENT
Start: 2019-02-14 | End: 2019-02-15

## 2019-02-14 RX ORDER — SODIUM CHLORIDE 9 MG/ML
10 INJECTION INTRAMUSCULAR; INTRAVENOUS; SUBCUTANEOUS AS NEEDED
Status: ACTIVE | OUTPATIENT
Start: 2019-02-14 | End: 2019-02-15

## 2019-02-14 RX ORDER — LINACLOTIDE 145 UG/1
CAPSULE, GELATIN COATED ORAL
Qty: 30 CAP | Refills: 0 | Status: SHIPPED | OUTPATIENT
Start: 2019-02-14 | End: 2019-03-01

## 2019-02-14 RX ADMIN — Medication 20 ML: at 09:37

## 2019-02-14 RX ADMIN — Medication 500 UNITS: at 09:38

## 2019-02-14 RX ADMIN — SODIUM CHLORIDE 10 ML: 9 INJECTION, SOLUTION INTRAMUSCULAR; INTRAVENOUS; SUBCUTANEOUS at 09:35

## 2019-02-14 NOTE — PROGRESS NOTES
Outpatient Infusion Center - Chemotherapy Progress Note 
 
2925 Pt admit to [facility] for port flush/labs ambulatory in stable condition. Assessment completed, had recent port flush, but labs were lost. No new concerns voiced. Port accessed with positive blood return. Labs drawn per order and sent. Line flushed, heparinized and de-accessed. Visit Vitals /88 Pulse 89 Temp 98.8 °F (37.1 °C) Resp 18 LMP 09/07/2011 SpO2 96% Medications: 
NS flush Heparin flush 
 
0940 Pt tolerated treatment well. D/c home ambulatory in no distress. Pt aware of next [facility] appointment scheduled for 03/14/2019. Recent Results (from the past 12 hour(s)) CBC WITH AUTOMATED DIFF Collection Time: 02/14/19  9:41 AM  
Result Value Ref Range WBC 6.8 3.6 - 11.0 K/uL  
 RBC 3.02 (L) 3.80 - 5.20 M/uL HGB 8.7 (L) 11.5 - 16.0 g/dL HCT 28.5 (L) 35.0 - 47.0 % MCV 94.4 80.0 - 99.0 FL  
 MCH 28.8 26.0 - 34.0 PG  
 MCHC 30.5 30.0 - 36.5 g/dL  
 RDW 13.9 11.5 - 14.5 % PLATELET 175 (L) 867 - 400 K/uL MPV 9.5 8.9 - 12.9 FL  
 NRBC 0.0 0  WBC ABSOLUTE NRBC 0.00 0.00 - 0.01 K/uL NEUTROPHILS 63 32 - 75 % LYMPHOCYTES 22 12 - 49 % MONOCYTES 12 5 - 13 % EOSINOPHILS 3 0 - 7 % BASOPHILS 0 0 - 1 % IMMATURE GRANULOCYTES 0 0.0 - 0.5 % ABS. NEUTROPHILS 4.3 1.8 - 8.0 K/UL  
 ABS. LYMPHOCYTES 1.5 0.8 - 3.5 K/UL  
 ABS. MONOCYTES 0.8 0.0 - 1.0 K/UL  
 ABS. EOSINOPHILS 0.2 0.0 - 0.4 K/UL  
 ABS. BASOPHILS 0.0 0.0 - 0.1 K/UL  
 ABS. IMM. GRANS. 0.0 0.00 - 0.04 K/UL  
 DF AUTOMATED METABOLIC PANEL, COMPREHENSIVE Collection Time: 02/14/19  9:41 AM  
Result Value Ref Range Sodium 138 136 - 145 mmol/L Potassium 4.6 3.5 - 5.1 mmol/L Chloride 110 (H) 97 - 108 mmol/L  
 CO2 21 21 - 32 mmol/L Anion gap 7 5 - 15 mmol/L Glucose 136 (H) 65 - 100 mg/dL BUN 33 (H) 6 - 20 MG/DL  Creatinine 2.07 (H) 0.55 - 1.02 MG/DL  
 BUN/Creatinine ratio 16 12 - 20    
 GFR est AA 30 (L) >60 ml/min/1.73m2 GFR est non-AA 25 (L) >60 ml/min/1.73m2 Calcium 9.0 8.5 - 10.1 MG/DL Bilirubin, total 0.3 0.2 - 1.0 MG/DL  
 ALT (SGPT) 26 12 - 78 U/L  
 AST (SGOT) 49 (H) 15 - 37 U/L Alk. phosphatase 97 45 - 117 U/L Protein, total 8.8 (H) 6.4 - 8.2 g/dL Albumin 3.2 (L) 3.5 - 5.0 g/dL Globulin 5.6 (H) 2.0 - 4.0 g/dL A-G Ratio 0.6 (L) 1.1 - 2.2 MAGNESIUM Collection Time: 02/14/19  9:41 AM  
Result Value Ref Range Magnesium 1.8 1.6 - 2.4 mg/dL CANCER ANTIGEN 125 Collection Time: 02/14/19  9:41 AM  
Result Value Ref Range CA-125 252 (H) 1.5 - 35.0 U/mL

## 2019-02-18 ENCOUNTER — TELEPHONE (OUTPATIENT)
Dept: GYNECOLOGY | Age: 59
End: 2019-02-18

## 2019-02-18 DIAGNOSIS — R79.89 CREATININE ELEVATION: ICD-10-CM

## 2019-02-18 DIAGNOSIS — C56.9 MALIGNANT NEOPLASM OF OVARY, UNSPECIFIED LATERALITY (HCC): Primary | ICD-10-CM

## 2019-02-18 NOTE — TELEPHONE ENCOUNTER
Per Dr. Melody Santana pt informed to stop Raheem Push and have labs repeated in one week. She has an appointment with Dr. Adi Arrington on 2/28/19 in reference to her rising creatinine, and possible stent. Pt will hold Raheem Push until after this is done. Lab req faxed to [facility] lab al[facility] for lab draw on 2/21/19.

## 2019-02-21 ENCOUNTER — PATIENT OUTREACH (OUTPATIENT)
Dept: INTERNAL MEDICINE CLINIC | Age: 59
End: 2019-02-21

## 2019-02-21 NOTE — TELEPHONE ENCOUNTER
Identified patient 2 identifiers verified. Patient will  letter Friday. Letter just needs 's signature.

## 2019-02-21 NOTE — TELEPHONE ENCOUNTER
Patient is requesting that letter be faxed to [phone]. Advises that she has to submit this letter by Monday, 2/25/19.

## 2019-02-21 NOTE — PROGRESS NOTES
Goals Addressed This Visit's Progress Chronic Disease  recurrent, metastatic ovarian cancer 2/21/2019  
- diagnosis of recurrent, metastatic ovarian cancer; chest wall mass positive for malignancy on biopsy - Metastatic high-grade serous carcinoma. Liver lesions. 
- Advance Medical Directive - on file - open to [facility] Case Management; had visit scheduled for today, however nurse did not show. Patient will contact Raman VILLASEÑOR to reschedule. - palliative treatment. started on Zejula 1/23/19. Most recent office visit with Dr. Lupe Bassett 2/7/19. 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 
- has office visit scheduled with Dr. Derek Fitzpatrick (Urology) 2/8/19 
- to have cbc, cmp collected (ordered by Dr. Lupe Bassett) today, however reports no transportation due to her car is in the shop until Monday and she does not have any family members/friends that are able to assist with transportation to/from lab. Patient will contact Dr. Sanjay Aviles office today to notify of barrier to having labs drawn today and for further recommendations. - patient agreeable to CCM; will follow every two weeks Diabetes  Patient verbalizes understanding of self -management goals of living with Diabetes. 1/8/2019 
- attended office visit with Ophthalmologist today for complete eye exam and reports advised to schedule follow-up with endocrinologist as soon as possible due to concern for uncontrolled blood glucose that \"might be affecting my eyes. \" Patient unable to be scheduled for appt with Endocrinologist until April due to appt availability; reports Ophthalmologist office visit note was being sent to Dr. Pam Batista for review 
- monitoring blood sugars twice daily at home (morning and evening); maintaining written record of blood glucose results, however is not at home during this outreach encounter and is unable to review most recent results 
- most recent hemoglobin a1c 11/27/18 - 5.5%. Most recent office visit with Dr. Barbi Lopez (Endocrinology) 3/3/17 
- reports blood glucose range of  mg/dL fasting and 130s in the evening 
- scheduled for office visit with Dr. Geronimo Connor 1/15/19 to review/discuss 1/11/2019 
- patient currently driving and unable to review most recent blood glucose readings with this NN; patient will bring home blood glucose record to scheduled office visit with PCP on 1/15/19 
 
2/21/2019 
- checking blood glucose twice daily; does not have blood glucose record available during this outreach encounter. 
- reports fasting blood glucose this morning of 92 mg/dL 
- has office visit scheduled with Dr. Barbi Lopez 5/21/19; patient is on wait list for earlier appointment if there is a cancellation Future Appointments: 
Future Appointments Date Time Provider Gabriel Phillip 3/14/2019  9:30 AM [facility]  
3/21/2019  1:45 PM Jose Roberto Terry MD [address] [facility]  
4/11/2019  9:30 AM BREMO INFUSION NURSE 6 [facility]  
4/18/2019  2:00 PM [place] Back, PA CGO CHAD SCHED  
5/9/2019  9:30 AM BREMO INFUSION NURSE [address] [facility]  
5/16/2019  2:00 PM [place] Shon Demarco [facility] SCHED  
5/21/2019 11:30 AM Denise Ruiz MD [address]  
7/16/2019  8:45 AM Abdullahi Irene MD [address] Last Appointment With Me: 
Visit date not found Last Appointment My Department: 
1/15/2019

## 2019-02-23 LAB
ALBUMIN SERPL-MCNC: 4 G/DL (ref 3.5–5.5)
ALBUMIN/GLOB SERPL: 1 {RATIO} (ref 1.2–2.2)
ALP SERPL-CCNC: 93 IU/L (ref 39–117)
ALT SERPL-CCNC: 18 IU/L (ref 0–32)
AST SERPL-CCNC: 35 IU/L (ref 0–40)
BASOPHILS # BLD AUTO: 0 X10E3/UL (ref 0–0.2)
BASOPHILS NFR BLD AUTO: 0 %
BILIRUB SERPL-MCNC: 0.4 MG/DL (ref 0–1.2)
BUN SERPL-MCNC: 26 MG/DL (ref 6–24)
BUN/CREAT SERPL: 18 (ref 9–23)
CALCIUM SERPL-MCNC: 9.7 MG/DL (ref 8.7–10.2)
CHLORIDE SERPL-SCNC: 106 MMOL/L (ref 96–106)
CO2 SERPL-SCNC: 17 MMOL/L (ref 20–29)
CREAT SERPL-MCNC: 1.43 MG/DL (ref 0.57–1)
EOSINOPHIL # BLD AUTO: 0.4 X10E3/UL (ref 0–0.4)
EOSINOPHIL NFR BLD AUTO: 4 %
ERYTHROCYTE [DISTWIDTH] IN BLOOD BY AUTOMATED COUNT: 15.3 % (ref 12.3–15.4)
GLOBULIN SER CALC-MCNC: 3.9 G/DL (ref 1.5–4.5)
GLUCOSE SERPL-MCNC: 129 MG/DL (ref 65–99)
HCT VFR BLD AUTO: 29.2 % (ref 34–46.6)
HGB BLD-MCNC: 9.3 G/DL (ref 11.1–15.9)
IMM GRANULOCYTES # BLD AUTO: 0 X10E3/UL (ref 0–0.1)
IMM GRANULOCYTES NFR BLD AUTO: 0 %
LYMPHOCYTES # BLD AUTO: 2.3 X10E3/UL (ref 0.7–3.1)
LYMPHOCYTES NFR BLD AUTO: 21 %
MCH RBC QN AUTO: 28.4 PG (ref 26.6–33)
MCHC RBC AUTO-ENTMCNC: 31.8 G/DL (ref 31.5–35.7)
MCV RBC AUTO: 89 FL (ref 79–97)
MONOCYTES # BLD AUTO: 1 X10E3/UL (ref 0.1–0.9)
MONOCYTES NFR BLD AUTO: 9 %
NEUTROPHILS # BLD AUTO: 7 X10E3/UL (ref 1.4–7)
NEUTROPHILS NFR BLD AUTO: 66 %
PLATELET # BLD AUTO: 208 X10E3/UL (ref 150–379)
POTASSIUM SERPL-SCNC: 5.6 MMOL/L (ref 3.5–5.2)
PROT SERPL-MCNC: 7.9 G/DL (ref 6–8.5)
RBC # BLD AUTO: 3.27 X10E6/UL (ref 3.77–5.28)
SODIUM SERPL-SCNC: 138 MMOL/L (ref 134–144)
WBC # BLD AUTO: 10.7 X10E3/UL (ref 3.4–10.8)

## 2019-02-27 ENCOUNTER — TELEPHONE (OUTPATIENT)
Dept: GYNECOLOGY | Age: 59
End: 2019-02-27

## 2019-02-27 NOTE — TELEPHONE ENCOUNTER
Pt , she states she has an appt with her urologist tomorrow afternoon at 3:30, zejula had been discontinued, she has her labs drawn on 2/22/19, Do you want her to start retaking the zejula?

## 2019-02-28 ENCOUNTER — PATIENT OUTREACH (OUTPATIENT)
Dept: INTERNAL MEDICINE CLINIC | Age: 59
End: 2019-02-28

## 2019-02-28 NOTE — PROGRESS NOTES
NN attempted patient outreach today in order to perform CCM follow-up; unable to lvm due to voicemail not set up.

## 2019-03-01 ENCOUNTER — HOSPITAL ENCOUNTER (OUTPATIENT)
Dept: PREADMISSION TESTING | Age: 59
Discharge: HOME | End: 2019-03-01
Payer: MEDICARE

## 2019-03-01 VITALS
DIASTOLIC BLOOD PRESSURE: 80 MMHG | RESPIRATION RATE: 20 BRPM | BODY MASS INDEX: 43.4 KG/M2 | SYSTOLIC BLOOD PRESSURE: 153 MMHG | HEART RATE: 79 BPM | TEMPERATURE: 97.9 F | WEIGHT: 293 LBS | HEIGHT: 69 IN

## 2019-03-01 LAB
ANION GAP SERPL CALC-SCNC: 9 MMOL/L (ref 5–15)
APPEARANCE UR: CLEAR
BACTERIA URNS QL MICRO: NEGATIVE /HPF
BASOPHILS # BLD: 0 K/UL (ref 0–0.1)
BASOPHILS NFR BLD: 1 % (ref 0–1)
BILIRUB UR QL: NEGATIVE
BUN SERPL-MCNC: 35 MG/DL (ref 6–20)
BUN/CREAT SERPL: 22 (ref 12–20)
CALCIUM SERPL-MCNC: 9.4 MG/DL (ref 8.5–10.1)
CHLORIDE SERPL-SCNC: 111 MMOL/L (ref 97–108)
CO2 SERPL-SCNC: 22 MMOL/L (ref 21–32)
COLOR UR: ABNORMAL
CREAT SERPL-MCNC: 1.6 MG/DL (ref 0.55–1.02)
DIFFERENTIAL METHOD BLD: ABNORMAL
EOSINOPHIL # BLD: 0.4 K/UL (ref 0–0.4)
EOSINOPHIL NFR BLD: 5 % (ref 0–7)
EPITH CASTS URNS QL MICRO: ABNORMAL /LPF
ERYTHROCYTE [DISTWIDTH] IN BLOOD BY AUTOMATED COUNT: 13.8 % (ref 11.5–14.5)
EST. AVERAGE GLUCOSE BLD GHB EST-MCNC: 128 MG/DL
GLUCOSE SERPL-MCNC: 118 MG/DL (ref 65–100)
GLUCOSE UR STRIP.AUTO-MCNC: NEGATIVE MG/DL
HBA1C MFR BLD: 6.1 % (ref 4.2–6.3)
HCT VFR BLD AUTO: 28.7 % (ref 35–47)
HGB BLD-MCNC: 8.4 G/DL (ref 11.5–16)
HGB UR QL STRIP: ABNORMAL
IMM GRANULOCYTES # BLD AUTO: 0 K/UL (ref 0–0.04)
IMM GRANULOCYTES NFR BLD AUTO: 0 % (ref 0–0.5)
KETONES UR QL STRIP.AUTO: NEGATIVE MG/DL
LEUKOCYTE ESTERASE UR QL STRIP.AUTO: ABNORMAL
LYMPHOCYTES # BLD: 1.8 K/UL (ref 0.8–3.5)
LYMPHOCYTES NFR BLD: 24 % (ref 12–49)
MCH RBC QN AUTO: 28.9 PG (ref 26–34)
MCHC RBC AUTO-ENTMCNC: 29.3 G/DL (ref 30–36.5)
MCV RBC AUTO: 98.6 FL (ref 80–99)
MONOCYTES # BLD: 0.8 K/UL (ref 0–1)
MONOCYTES NFR BLD: 11 % (ref 5–13)
NEUTS SEG # BLD: 4.4 K/UL (ref 1.8–8)
NEUTS SEG NFR BLD: 59 % (ref 32–75)
NITRITE UR QL STRIP.AUTO: NEGATIVE
NRBC # BLD: 0 K/UL (ref 0–0.01)
NRBC BLD-RTO: 0 PER 100 WBC
PH UR STRIP: 5 [PH] (ref 5–8)
PLATELET # BLD AUTO: 176 K/UL (ref 150–400)
PMV BLD AUTO: 9.8 FL (ref 8.9–12.9)
POTASSIUM SERPL-SCNC: 4.8 MMOL/L (ref 3.5–5.1)
PROT UR STRIP-MCNC: 100 MG/DL
RBC # BLD AUTO: 2.91 M/UL (ref 3.8–5.2)
RBC #/AREA URNS HPF: ABNORMAL /HPF (ref 0–5)
SODIUM SERPL-SCNC: 142 MMOL/L (ref 136–145)
SP GR UR REFRACTOMETRY: 1.02 (ref 1–1.03)
UROBILINOGEN UR QL STRIP.AUTO: 0.2 EU/DL (ref 0.2–1)
WBC # BLD AUTO: 7.4 K/UL (ref 3.6–11)
WBC URNS QL MICRO: ABNORMAL /HPF (ref 0–4)

## 2019-03-01 PROCEDURE — 81001 URINALYSIS AUTO W/SCOPE: CPT

## 2019-03-01 PROCEDURE — 80048 BASIC METABOLIC PNL TOTAL CA: CPT

## 2019-03-01 PROCEDURE — 85025 COMPLETE CBC W/AUTO DIFF WBC: CPT

## 2019-03-01 PROCEDURE — 36415 COLL VENOUS BLD VENIPUNCTURE: CPT

## 2019-03-01 PROCEDURE — 83036 HEMOGLOBIN GLYCOSYLATED A1C: CPT

## 2019-03-01 PROCEDURE — 87086 URINE CULTURE/COLONY COUNT: CPT

## 2019-03-01 RX ORDER — AZITHROMYCIN 250 MG/1
250 TABLET, FILM COATED ORAL DAILY
COMMUNITY
Start: 2019-02-27 | End: 2019-03-03

## 2019-03-01 NOTE — PERIOP NOTES
MESSAGE LEFT AT DR NEGRON'S OFFICE ADVISING THAT PATIENT IS BEING SEEN IN PAT NOW FOR PRE-OP TESTING FOR SURGERY SCHEDULED ON 3/4/2019. ASKED IF THEY COULD RETURN THIS CALL ADVISING WHETHER PATIENT NEEDS TO SEE DR NEGRON, FOR CARDIAC CLEARANCE,  PRIOR TO THIS PLANNED SURGERY. GAVE RETURN # TO CALL.

## 2019-03-03 LAB
BACTERIA SPEC CULT: NORMAL
CC UR VC: NORMAL
SERVICE CMNT-IMP: NORMAL

## 2019-03-04 NOTE — TELEPHONE ENCOUNTER
Pt  and states they had to reschedule her surgery to put the stent in due to cardiologist did not give her clearance. She has a follow up appt with her cardiologist on 3/14/19 and if cleared will follow up with the urologist and reschedule surgery. Do yo want her to start retaking her zejula or hold?

## 2019-03-04 NOTE — TELEPHONE ENCOUNTER
I called pt and was unable to leave a message, pt's mailbox has not been set up yet, pt can restart zejula, per Amber Rowe will have to do weekly labs, what day will she be restarting medication, send message to erik with start date

## 2019-03-05 NOTE — TELEPHONE ENCOUNTER
I called and s/w pt, advised she can restart her zejula, she states she does not have any pills and she spoke with someone today and stated her shipment should arrive at her home on Friday 3/8/19.   She states she will be out of town until 3/12/19, she goes to see her cardiologist on 3/14/19 and that is when she will restart her zejula on 3/14/19

## 2019-03-06 ENCOUNTER — PATIENT OUTREACH (OUTPATIENT)
Dept: INTERNAL MEDICINE CLINIC | Age: 59
End: 2019-03-06

## 2019-03-06 NOTE — PROGRESS NOTES
Patient reports recent acute visit at [place] [address] where she was diagnosed with bronchitis and prescribed amoxicillin and prednisone; completed prednisone and amoxicillin. Patient denies questions/concerns today. Goals Addressed                 This Visit's Progress       Chronic Disease     recurrent, metastatic ovarian cancer        2/21/2019   - diagnosis of recurrent, metastatic ovarian cancer; chest wall mass positive for malignancy on biopsy - Metastatic high-grade serous carcinoma. Liver lesions.  - Advance Medical Directive - on file  - open to [facility] Case Management; had visit scheduled for today, however nurse did not show. Patient will contact Raman VILLASEÑOR to reschedule. - palliative treatment. started on Zejula 1/23/19. Most recent office visit with Dr. Mickeal Alpers 2/7/19. Slight rise in Creatinine from 1.4 to 1.6; referred to Urology for consideration of outpatient placement of ureteral stent (diagnosis of hydronephrosis due to obstruction of ureter), oxycodone 5 mg every 4 hours as needed for cancer associated pain management  - has office visit scheduled with Dr. Renetta Woodruff (Urology) 2/8/19  - to have cbc, cmp collected (ordered by Dr. Mickeal Alpers) today, however reports no transportation due to her car is in the shop until Monday and she does not have any family members/friends that are able to assist with transportation to/from lab. Patient will contact Dr. Macario Suarez office today to notify of barrier to having labs drawn today and for further recommendations.   - patient agreeable to [facility]; will follow every two weeks    3/6/2019   - had cbc, cmp drawn on 2/22/19  - plans to have Right cystoscopy and right stent placement, however procedure scheduling is pending cardiac clearance; diagnosis of hydronephrosis due to obstruction of ureter  - will attend office visit with Cardiology 3/14/19 for pre-op cardiac clearance              Future Appointments:  Future Appointments   Date Time Provider [place]   3/14/2019  9:30 AM [facility]   3/14/2019 10:30 AM Shilo Beatty NP [facility]AD SCHED   3/21/2019  1:45 PM Deepti Hamilton MD [facility]   4/11/2019  9:30 AM [facility]   4/18/2019  2:00 PM MALLIKA Jj CGO CHAD SCHED   5/9/2019  9:30 AM BREMO INFUSION NURSE 6 [facility]   5/16/2019  2:00 PM Shon Jj CGO CHAD SCHED   5/21/2019 11:30 AM Vikram Cruz MD [address]   7/16/2019  8:45 AM Jose E Porter MD [address]        Last Appointment With Me:  Visit date not found     Last Appointment My Department:  1/15/2019

## 2019-03-06 NOTE — TELEPHONE ENCOUNTER
Spoke with pt to confirm she has an appt for cardiac clearance for stent placement on 3/14/19. The stent has not been r/s until after this appt. Pt scheduled for labs/port flush at [facility] on 3/14/19, and if labs look good she will restart Zejula 100 mg caps 2 tabs daily (Lou Hilario will be delivered to her home 3/8/19.

## 2019-03-14 ENCOUNTER — DOCUMENTATION ONLY (OUTPATIENT)
Dept: CARDIOLOGY CLINIC | Age: 59
End: 2019-03-14

## 2019-03-14 ENCOUNTER — OFFICE VISIT (OUTPATIENT)
Dept: CARDIOLOGY CLINIC | Age: 59
End: 2019-03-14

## 2019-03-14 VITALS
DIASTOLIC BLOOD PRESSURE: 70 MMHG | SYSTOLIC BLOOD PRESSURE: 128 MMHG | BODY MASS INDEX: 43.4 KG/M2 | RESPIRATION RATE: 18 BRPM | OXYGEN SATURATION: 98 % | HEIGHT: 69 IN | WEIGHT: 293 LBS | HEART RATE: 81 BPM

## 2019-03-14 DIAGNOSIS — Z01.810 PRE-OPERATIVE CARDIOVASCULAR EXAMINATION: Primary | ICD-10-CM

## 2019-03-14 DIAGNOSIS — Z79.4 CONTROLLED TYPE 2 DIABETES MELLITUS WITHOUT COMPLICATION, WITH LONG-TERM CURRENT USE OF INSULIN (HCC): ICD-10-CM

## 2019-03-14 DIAGNOSIS — E78.5 DYSLIPIDEMIA (HIGH LDL; LOW HDL): ICD-10-CM

## 2019-03-14 DIAGNOSIS — I10 BENIGN ESSENTIAL HYPERTENSION: ICD-10-CM

## 2019-03-14 DIAGNOSIS — E11.9 CONTROLLED TYPE 2 DIABETES MELLITUS WITHOUT COMPLICATION, WITH LONG-TERM CURRENT USE OF INSULIN (HCC): ICD-10-CM

## 2019-03-14 RX ORDER — NIRAPARIB 100 MG/1
100 CAPSULE ORAL 2 TIMES DAILY
COMMUNITY
Start: 2019-03-05 | End: 2020-01-01 | Stop reason: SDUPTHER

## 2019-03-14 RX ORDER — AMOXICILLIN 500 MG/1
CAPSULE ORAL
COMMUNITY
Start: 2019-03-12 | End: 2020-01-13 | Stop reason: ALTCHOICE

## 2019-03-14 RX ORDER — EPINEPHRINE 0.3 MG/.3ML
INJECTION SUBCUTANEOUS
Refills: 1 | COMMUNITY
Start: 2019-01-16

## 2019-03-14 RX ORDER — FLUTICASONE PROPIONATE 50 MCG
SPRAY, SUSPENSION (ML) NASAL
COMMUNITY
Start: 2019-03-08 | End: 2019-12-20 | Stop reason: SDUPTHER

## 2019-03-14 NOTE — PROGRESS NOTES
Yee Perkins DNP, ANP-BC  Subjective/HPI:     Corrie Osborne is a 63 y.o. female is here for cardiac clearance pending ureter stent for hydronephrosis. Patient has been feeling well other than intermittent colic pain. She does admit yesterday having some anterior chest pressure/indigestion shortly after taking Tylenol. She was able to alleviate her symptoms with drinking cold water and belching. She denies dyspnea on exertion she denies chest pain related to exertional activities. PCP Provider  Abdullahi Irene MD  Past Medical History:   Diagnosis Date    Adverse effect of anesthesia     sleep apnea cpap machine useage     Anemia     Arthritis     DDD low back and knees    Asthma     uses albuterol prn only; none x 6 mos.   Never hospitalized    BRCA negative 2013    Calculus of kidney     Chronic kidney disease     kidney stones    Chronic pain     knee pain bilat    CINV (chemotherapy-induced nausea and vomiting) 2014    Decreased hearing, right     PATIENT STATES THIS IS DUE TO CHEMOTHERAPY    Diabetes ([address] Age 46    IDDM    Environmental allergies     Fibromyalgia     GERD (gastroesophageal reflux disease)     controlled with med    Hx of pulmonary embolus during pregnancy 2015    Hypertension     Ill-defined condition     Sepsis  -  SOURCE PORT    Ill-defined condition 2016    chemotherapy     Mass of chest wall 2018    Morbid obesity ([address]     Murmur, heart     Other and unspecified hyperlipidemia     Ovarian cancer ([address] 2012, 2014    serous, high grade, stage IIIc. s/p chemotx (has port)    Psychiatric disorder     Rectal bleeding     Thromboembolus ([address]     POST PARTUM; resolved- PELVIS    Thyroid disease     HYPOTHYROID    Unspecified sleep apnea     CPAP, Dr. Manohar Del Toro      Past Surgical History:   Procedure Laterality Date    BREAST SURGERY PROCEDURE UNLISTED      Lymph node in left breast 2017   [place]     X2    HX HYSTERECTOMY  9/2012    ROULA/BSO, tumor debulking, omentectomy for ovarian cancer    HX ORTHOPAEDIC Right 1990    ankle-FX, R    HX ORTHOPAEDIC Right 1980'S    FX R ARM    HX UROLOGICAL Right 2017    STENT FOR KIDNEY STONES    HX VASCULAR ACCESS  11/4/2015    right chest- port placed    HX VASCULAR ACCESS Left 2017    TETE CATH     Allergies   Allergen Reactions    Carboplatin Other (comments)     Patient developed shortness of breath, felt faint, coughing, skin flushed and \"itchy\". This occurred on the patients 8th treatment.  Iodinated Contrast- Oral And Iv Dye Rash     13 hr pre-medication prior to IV contrast.   Patient has done well with 1 hr pre-medication of (Solu-Medrol) 40mg &  (Benadryl) 50mg.  Lipitor [Atorvastatin] Myalgia    Shellfish Containing Products Hives    Tape [Adhesive] Itching and Other (comments)     \"op site-- clear,thin tape\"--caused blister     Tomato Hives    Olmesartan Other (comments)    Losartan Other (comments)     headaches    Percocet [Oxycodone-Acetaminophen] Other (comments)     Fever; however, current home med      Family History   Problem Relation Age of Onset    Hypertension Mother     Arthritis-osteo Mother     Hypertension Father     Arthritis-osteo Father     Cancer Father         PROSTATE    COPD Father     Hypertension Brother     Elevated Lipids Brother     Arthritis-osteo Brother     Hypertension Brother     Elevated Lipids Brother     Obesity Brother     Cancer Brother         PROSTATE    Anesth Problems Son         DELAYED AWAKENING    Diabetes Maternal Grandmother     Anesth Problems Paternal Grandmother         PATIENT STATES [address] STOPPED DURING SURGERY      Current Outpatient Medications   Medication Sig    EPINEPHrine (EPIPEN) 0.3 mg/0.3 mL injection INJECT 0.3 ML BY INTRAMUSCULAR ROUTE ONCE AS NEEDED FOR UP TO 1 DOSE.  ZEJULA 100 mg cap Take 100 mg by mouth two (2) times a day.     fluticasone propionate (FLONASE) 50 mcg/actuation nasal spray     amoxicillin (AMOXIL) 500 mg capsule Prior to dental procedures    oxyCODONE IR (ROXICODONE) 5 mg immediate release tablet Take 1 Tab by mouth every four (4) hours as needed for Pain. Max Daily Amount: 30 mg.    glucose blood VI test strips (TRUE METRIX GLUCOSE TEST STRIP) strip by Does Not Apply route See Admin Instructions.  lancets misc by Does Not Apply route daily. True Metrix lancets- test blood sugar once per day    Blood-Glucose Meter monitoring kit Check up to tid, as directed    levothyroxine (SYNTHROID) 150 mcg tablet Take 1 Tab by mouth Daily (before breakfast).  ondansetron (ZOFRAN ODT) 4 mg disintegrating tablet Take 1 Tab by mouth every eight (8) hours as needed for Nausea.  azelastine (ASTELIN) 137 mcg (0.1 %) nasal spray 1 Silas by Both Nostrils route daily as needed. Use in each nostril as directed     insulin detemir U-100 (LEVEMIR) 100 unit/mL injection 20 Units by SubCUTAneous route daily (with lunch).  linaclotide (LINZESS) 145 mcg cap capsule Take 145 mcg by mouth daily as needed.  montelukast (SINGULAIR) 10 mg tablet Take 10 mg by mouth nightly.  ascorbic acid, vitamin C, (VITAMIN C) 1,000 mg tablet Take 1,000 mg by mouth daily.  albuterol (PROVENTIL VENTOLIN) 2.5 mg /3 mL (0.083 %) nebulizer solution 2.5 mg by Nebulization route every four (4) hours as needed for Wheezing.  potassium chloride (KLOR-CON M10) 10 mEq tablet TAKE 1 TAB BY MOUTH TWO (2) DAYS A WEEK. WED AND FRI    telmisartan (MICARDIS) 20 mg tablet Take 1 Tab by mouth daily.  insulin aspart U-100 (NOVOLOG FLEXPEN U-100 INSULIN) 100 unit/mL inpn INJECT 20 UNITS BEFORE EACH MEAL + 4 UNITS FOR EVERY 50 MG/DL ABOVE 150 MG/DL.  UNITS PER DAY    omega-3 acid ethyl esters (LOVAZA) 1 gram capsule TAKE ONE CAPSULE BY MOUTH TWICE A DAY    Dexlansoprazole (DEXILANT) 60 mg CpDB Take 1 Cap by mouth daily.     Liraglutide (VICTOZA) 0.6 mg/0.1 mL (18 mg/3 mL) pnij 1.8 mg by SubCUTAneous route daily (with lunch).  SYMBICORT 160-4.5 mcg/actuation HFAA Take 2 Puffs by inhalation two (2) times daily as needed.  clonazePAM (KLONOPIN) 0.5 mg tablet Take 0.5 mg by mouth nightly as needed.  sertraline (ZOLOFT) 50 mg tablet TAKE 1 TABLET BY MOUTH EVERY MORNING    cholecalciferol, VITAMIN D3, (VITAMIN D3) 5,000 unit tab tablet Take 5,000 Units by mouth daily.  furosemide (LASIX) 40 mg tablet Take 40 mg by mouth every other day. No current facility-administered medications for this visit. Vitals:    03/14/19 1031 03/14/19 1047   BP: 130/68 128/70   Pulse: 81    Resp: 18    SpO2: 98%    Weight: 311 lb 3.2 oz (141.2 kg)    Height: 5' 9\" (1.753 m)      Social History     Socioeconomic History    Marital status:      Spouse name: Not on file    Number of children: Not on file    Years of education: Not on file    Highest education level: Not on file   Social Needs    Financial resource strain: Not on file    Food insecurity - worry: Not on file    Food insecurity - inability: Not on file   [employer] needs - medical: Not on file   [employer] needs - non-medical: Not on file   Occupational History    Not on file   Tobacco Use    Smoking status: Never Smoker    Smokeless tobacco: Never Used   Substance and Sexual Activity    Alcohol use: Yes     Comment: OCCASIONALLY    Drug use: No    Sexual activity: Yes   Other Topics Concern    Not on file   Social History Narrative    Lives in [place] alone.  passed away in 5/16 of a heart attack. Has 2 sons. Disability. 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 Enjoys swimming and going to the gym. I have reviewed the nurses notes, vitals, problem list, allergy list, medical history, family, social history and medications. Review of Symptoms:    General: Pt denies excessive weight gain or loss.  Pt is able to conduct ADL's  HEENT: Denies blurred vision, headaches, epistaxis and difficulty swallowing. Respiratory: Denies shortness of breath, ECKERT, wheezing or stridor. Cardiovascular: Denies exertional chest pain, palpitations, edema or PND. Gastrointestinal: Denies poor appetite, +1 episode yesterday of indigestion, denies abdominal pain or blood in stool  Musculoskeletal: Denies pain or swelling from muscles or joints  Neurologic: Denies tremor, paresthesias, or sensory motor disturbance  Skin: Denies rash, itching or texture change. Physical Exam:      General: Well developed, in no acute distress, cooperative and alert  HEENT: No carotid bruits, no JVD, trach is midline. Neck Supple, PEERL, EOM intact. Heart:  Normal S1/S2 negative S3 or S4. Regular, no murmur, gallop or rub. Nontender with chest wall palpation, there is a large anterior mass above the right breast which patient is aware of and undergoing oncology treatment for. Respiratory: Clear bilaterally x 4, no wheezing or rales  Abdomen:   Soft, non-tender, no masses, bowel sounds are active.   Extremities:  No edema, normal cap refill, no cyanosis, atraumatic. Neuro: A&Ox3, speech clear, gait stable. Skin: Skin color is normal. No rashes or lesions.  Non diaphoretic  Vascular: 2+ pulses symmetric in all extremities    Cardiographics    ECG: Sinus rhythm unchanged from previous tracing  Results for orders placed or performed during the hospital encounter of 11/26/18   EKG, 12 LEAD, INITIAL   Result Value Ref Range    Ventricular Rate 85 BPM    Atrial Rate 85 BPM    P-R Interval 154 ms    QRS Duration 74 ms    Q-T Interval 326 ms    QTC Calculation (Bezet) 387 ms    Calculated P Axis 42 degrees    Calculated R Axis -14 degrees    Calculated T Axis 31 degrees    Diagnosis       Normal sinus rhythm  When compared with ECG of 17-JUL-2018 11:48,  No significant change was found  Confirmed by Trice Villanueva MD ([id number]) on 11/26/2018 9:06:35 PM           Cardiology Labs:  Lab Results Component Value Date/Time    Cholesterol, total 216 (H) 03/28/2018 11:47 AM    HDL Cholesterol 39 (L) 03/28/2018 11:47 AM    LDL, calculated 142 (H) 03/28/2018 11:47 AM    Triglyceride 173 (H) 03/28/2018 11:47 AM       Lab Results   Component Value Date/Time    Sodium 142 03/01/2019 03:30 PM    Potassium 4.8 03/01/2019 03:30 PM    Chloride 111 (H) 03/01/2019 03:30 PM    CO2 22 03/01/2019 03:30 PM    Anion gap 9 03/01/2019 03:30 PM    Glucose 118 (H) 03/01/2019 03:30 PM    BUN 35 (H) 03/01/2019 03:30 PM    Creatinine 1.60 (H) 03/01/2019 03:30 PM    BUN/Creatinine ratio 22 (H) 03/01/2019 03:30 PM    GFR est AA 40 (L) 03/01/2019 03:30 PM    GFR est non-AA 33 (L) 03/01/2019 03:30 PM    Calcium 9.4 03/01/2019 03:30 PM    Bilirubin, total 0.4 02/22/2019 11:27 AM    AST (SGOT) 35 02/22/2019 11:27 AM    Alk. phosphatase 93 02/22/2019 11:27 AM    Protein, total 7.9 02/22/2019 11:27 AM    Albumin 4.0 02/22/2019 11:27 AM    Globulin 5.6 (H) 02/14/2019 09:41 AM    A-G Ratio 1.0 (L) 02/22/2019 11:27 AM    ALT (SGPT) 18 02/22/2019 11:27 AM           Assessment:     Assessment:     Diagnoses and all orders for this visit:    1. Pre-operative cardiovascular examination    2. Dyslipidemia (high LDL; low HDL)  -     AMB POC EKG ROUTINE W/ 12 LEADS, INTER & REP    3. Benign essential hypertension    4. Controlled type 2 diabetes mellitus without complication, with long-term current use of insulin ([facility]        ICD-10-CM ICD-9-CM    1. Pre-operative cardiovascular examination Z01.810 V72.81    2. Dyslipidemia (high LDL; low HDL) E78.5 272.4 AMB POC EKG ROUTINE W/ 12 LEADS, INTER & REP   3. Benign essential hypertension I10 401.1    4.  Controlled type 2 diabetes mellitus without complication, with long-term current use of insulin ([facility]) E11.9 250.00     Z79.4 V58.67      Orders Placed This Encounter    AMB POC EKG ROUTINE W/ 12 LEADS, INTER & REP     Order Specific Question:   Reason for Exam:     Answer:   Routine    EPINEPHrine (EPIPEN) 0.3 mg/0.3 mL injection     Sig: INJECT 0.3 ML BY INTRAMUSCULAR ROUTE ONCE AS NEEDED FOR UP TO 1 DOSE. Refill:  1    ZEJULA 100 mg cap     Sig: Take 100 mg by mouth two (2) times a day.  fluticasone propionate (FLONASE) 50 mcg/actuation nasal spray    amoxicillin (AMOXIL) 500 mg capsule     Sig: Prior to dental procedures        Plan:     Patient is a 67-year-old female who presents for cardiac clearance for urology procedure for ureter stenting for hydronephrosis. Denies exertional chest pain dyspnea on exertion or excessive fatigue. No history of atherosclerotic heart disease, echocardiogram July 2018 shows normal ejection fraction, negative nuclear stress test 2017. Stable from cardiac perspective she is cleared to proceed with urology procedure. Follow-up with cardiology in 1 year. Loulou Stewart NP    This note was created using voice recognition software. Despite editing, there may be syntax errors.

## 2019-03-14 NOTE — PROGRESS NOTES
Recent office notes for clearance faxed to Dr. Rashaad Fan [facility] at [phone] with confirmation received.

## 2019-03-14 NOTE — PROGRESS NOTES
1. Have you been to the ER, urgent care clinic since your last visit? Hospitalized since your last visit? No    2. Have you seen or consulted any other health care providers outside of the [address] [place] [address] since your last visit? Include any pap smears or colon screening.  Yes Reason for visit: Dr. Darrel Allen x 1 mo ago    Chief Complaint   Patient presents with    Surgical Clearance     for cystoscopy Dr. Benetta Gottron Chest Pain     pt reports dull, achy pain to chest yesterday, duration x 1 hr     Shortness of Breath

## 2019-03-15 ENCOUNTER — TELEPHONE (OUTPATIENT)
Dept: GYNECOLOGY | Age: 59
End: 2019-03-15

## 2019-03-15 RX ORDER — LINACLOTIDE 145 UG/1
CAPSULE, GELATIN COATED ORAL
Qty: 30 CAP | Refills: 0 | Status: SHIPPED | OUTPATIENT
Start: 2019-03-15 | End: 2019-04-14 | Stop reason: SDUPTHER

## 2019-03-15 NOTE — TELEPHONE ENCOUNTER
Pt calling to inform office she has received cardiac clearance and is scheduled for sx for stent on 3/18/19. She has an appt with Dr. Aravind Vidales on 3/21/19, and will discuss starting Lázaro Edge again. She did not go for her labs yesterday as she states she was sick.

## 2019-03-15 NOTE — PERIOP NOTES
PC to [place]  Urology spoke with Alice Patel to confirm if pt needed to come in for PAT visit today. Pt has current EKG, CXR, Cardiac Clearance, CBC, BMP, and UA from 3/1/2019. Alice Patel stated pt did not need to come in and if there was any lab they wanted, it would be done the  Day of surgery. PC to pt to cancel PAT visit this afternoon, pt very agreeable.

## 2019-03-15 NOTE — PERIOP NOTES
[facility]  Preoperative Instructions        Surgery Date 3/18/2019         Time of Arrival 8:30 AM    1. On the day of your surgery, please report to the Surgical Services Registration Desk and sign in at your designated time. The Surgery Center is located to the right of the Emergency Room. 2. You must have someone with you to drive you home. You should not drive a car for 24 hours following surgery. Please make arrangements for a friend or family member to stay with you for the first 24 hours after your surgery. 3. Do not have anything to eat or drink (including water, gum, mints, coffee, juice) after midnight 3/17/2019?? Loretta Hicks ? This may not apply to medications prescribed by your physician. ?(Please note below the special instructions with medications to take the morning of your procedure.)    4. We recommend you do not drink any alcoholic beverages for 24 hours before and after your surgery. 5. Contact your surgeons office for instructions on the following medications: non-steroidal anti-inflammatory drugs (i.e. Advil, Aleve), vitamins, and supplements. (Some surgeons will want you to stop these medications prior to surgery and others may allow you to take them)  **If you are currently taking Plavix, Coumadin, Aspirin and/or other blood-thinning agents, contact your surgeon for instructions. ** Your surgeon will partner with the physician prescribing these medications to determine if it is safe to stop or if you need to continue taking. Please do not stop taking these medications without instructions from your surgeon    6. Wear comfortable clothes. Wear glasses instead of contacts. Do not bring any money or jewelry. Please bring picture ID, insurance card, and any prearranged co-payment or hospital payment. Do not wear make-up, particularly mascara the morning of your surgery. Do not wear nail polish, particularly if you are having foot /hand surgery.   Wear your hair loose or down, no ponytails, buns, charles pins or clips. All body piercings must be removed. Please shower with antibacterial soap for three consecutive days before and on the morning of surgery, but do not apply any lotions, powders or deodorants after the shower on the day of surgery. Please use a fresh towels after each shower. Please sleep in clean clothes and change bed linens the night before surgery. Please do not shave for 48 hours prior to surgery. Shaving of the face is acceptable. 7. You should understand that if you do not follow these instructions your surgery may be cancelled. If your physical condition changes (I.e. fever, cold or flu) please contact your surgeon as soon as possible. 8. It is important that you be on time. If a situation occurs where you may be late, please call [phone] (OR Holding Area). 9. If you have any questions and or problems, please call [phone] (Pre-admission Testing). 10. Your surgery time may be subject to change. You will receive a phone call the evening prior if your time changes. 11.  If having outpatient surgery, you must have someone to drive you here, stay with you during the duration of your stay, and to drive you home at time of discharge. 12.   In an effort to improve the efficiency, privacy, and safety for all of our Pre-op patients visitors are not allowed in the Holding area. Once you arrive and are registered your family/visitors will be asked to remain in the waiting room. The Pre-op staff will get you from the Surgical Waiting Area and will explain to you and your family/visitors that the Pre-op phase is beginning. The staff will answer any questions and provide instructions for tracking of the patient, by use of the existing tracking number and color-coded status board in the waiting room.   At this time the staff will also ask for your designated spokesperson information in the event that the physician or staff need to provide an update or obtain any pertinent information. The designated spokesperson will be notified if the physician needs to speak to family during the pre-operative phase. If at any time your family/visitors has questions or concerns they may approach the volunteer desk in the waiting area for assistance. Special Instructions:  Use your inhaler the morning of surgery and bring with you to the hospital.     Bring your CPAP machine to have available    MEDICATIONS TO TAKE THE MORNING OF SURGERY WITH A SIP OF WATER:  Symbicort Inhaler, Dexilant, Lasix, Levothyroxine, Singulair, Zofran if needed, Oxycodone if needed, Sertraline      I understand a pre-operative phone call will be made to verify my surgery time. In the event that I am not available, I give permission for a message to be left on my answering service and/or with another person?  Yes [phone]         ___________________      __________   _________    (Signature of Patient)             (Witness)                (Date and Time)

## 2019-03-15 NOTE — PERIOP NOTES
Donald Hamilton from Dr. Floyd [facility] office left a message on voicemail to repeat BMP and ua DOS.

## 2019-03-18 ENCOUNTER — ANESTHESIA EVENT (OUTPATIENT)
Dept: SURGERY | Age: 59
End: 2019-03-18
Payer: MEDICARE

## 2019-03-18 ENCOUNTER — HOSPITAL ENCOUNTER (OUTPATIENT)
Age: 59
Setting detail: OUTPATIENT SURGERY
Discharge: HOME | End: 2019-03-18
Attending: UROLOGY | Admitting: UROLOGY
Payer: MEDICARE

## 2019-03-18 ENCOUNTER — APPOINTMENT (OUTPATIENT)
Dept: GENERAL RADIOLOGY | Age: 59
End: 2019-03-18
Attending: UROLOGY
Payer: MEDICARE

## 2019-03-18 ENCOUNTER — ANESTHESIA (OUTPATIENT)
Dept: SURGERY | Age: 59
End: 2019-03-18
Payer: MEDICARE

## 2019-03-18 VITALS
DIASTOLIC BLOOD PRESSURE: 66 MMHG | SYSTOLIC BLOOD PRESSURE: 144 MMHG | HEART RATE: 73 BPM | TEMPERATURE: 98.2 F | WEIGHT: 293 LBS | OXYGEN SATURATION: 97 % | BODY MASS INDEX: 41.95 KG/M2 | HEIGHT: 70 IN | RESPIRATION RATE: 16 BRPM

## 2019-03-18 PROBLEM — N13.1 HYDRONEPHROSIS DUE TO OBSTRUCTION OF URETER: Status: RESOLVED | Noted: 2019-03-18 | Resolved: 2019-03-18

## 2019-03-18 PROBLEM — N13.1 HYDRONEPHROSIS DUE TO OBSTRUCTION OF URETER: Status: ACTIVE | Noted: 2019-03-18

## 2019-03-18 LAB
ANION GAP SERPL CALC-SCNC: 7 MMOL/L (ref 5–15)
APPEARANCE UR: CLEAR
BACTERIA URNS QL MICRO: NEGATIVE /HPF
BILIRUB UR QL: NEGATIVE
BUN SERPL-MCNC: 26 MG/DL (ref 6–20)
BUN/CREAT SERPL: 21 (ref 12–20)
CALCIUM SERPL-MCNC: 9.4 MG/DL (ref 8.5–10.1)
CHLORIDE SERPL-SCNC: 112 MMOL/L (ref 97–108)
CO2 SERPL-SCNC: 23 MMOL/L (ref 21–32)
COLOR UR: ABNORMAL
CREAT SERPL-MCNC: 1.26 MG/DL (ref 0.55–1.02)
EPITH CASTS URNS QL MICRO: ABNORMAL /LPF
GLUCOSE BLD STRIP.AUTO-MCNC: 112 MG/DL (ref 65–100)
GLUCOSE SERPL-MCNC: 108 MG/DL (ref 65–100)
GLUCOSE UR STRIP.AUTO-MCNC: NEGATIVE MG/DL
HGB UR QL STRIP: ABNORMAL
HYALINE CASTS URNS QL MICRO: ABNORMAL /LPF (ref 0–5)
KETONES UR QL STRIP.AUTO: NEGATIVE MG/DL
LEUKOCYTE ESTERASE UR QL STRIP.AUTO: ABNORMAL
NITRITE UR QL STRIP.AUTO: NEGATIVE
PH UR STRIP: 5 [PH] (ref 5–8)
POTASSIUM SERPL-SCNC: 5.2 MMOL/L (ref 3.5–5.1)
PROT UR STRIP-MCNC: 100 MG/DL
RBC #/AREA URNS HPF: ABNORMAL /HPF (ref 0–5)
SERVICE CMNT-IMP: ABNORMAL
SODIUM SERPL-SCNC: 142 MMOL/L (ref 136–145)
SP GR UR REFRACTOMETRY: 1.02 (ref 1–1.03)
UA: UC IF INDICATED,UAUC: ABNORMAL
UROBILINOGEN UR QL STRIP.AUTO: 0.2 EU/DL (ref 0.2–1)
WBC URNS QL MICRO: ABNORMAL /HPF (ref 0–4)

## 2019-03-18 PROCEDURE — 82962 GLUCOSE BLOOD TEST: CPT

## 2019-03-18 PROCEDURE — C2617 STENT, NON-COR, TEM W/O DEL: HCPCS | Performed by: UROLOGY

## 2019-03-18 PROCEDURE — 77030012961 HC IRR KT CYSTO/TUR ICUM -A: Performed by: UROLOGY

## 2019-03-18 PROCEDURE — 77030019908 HC STETH ESOPH SIMS -A: Performed by: NURSE ANESTHETIST, CERTIFIED REGISTERED

## 2019-03-18 PROCEDURE — 76210000020 HC REC RM PH II FIRST 0.5 HR: Performed by: UROLOGY

## 2019-03-18 PROCEDURE — 36415 COLL VENOUS BLD VENIPUNCTURE: CPT

## 2019-03-18 PROCEDURE — 74011250636 HC RX REV CODE- 250/636

## 2019-03-18 PROCEDURE — 77030026438 HC STYL ET INTUB CARD -A: Performed by: NURSE ANESTHETIST, CERTIFIED REGISTERED

## 2019-03-18 PROCEDURE — 77030032435

## 2019-03-18 PROCEDURE — 74011250636 HC RX REV CODE- 250/636: Performed by: UROLOGY

## 2019-03-18 PROCEDURE — 77030018846 HC SOL IRR STRL H20 ICUM -A: Performed by: UROLOGY

## 2019-03-18 PROCEDURE — 74011000250 HC RX REV CODE- 250

## 2019-03-18 PROCEDURE — 87086 URINE CULTURE/COLONY COUNT: CPT

## 2019-03-18 PROCEDURE — 77030018836 HC SOL IRR NACL ICUM -A: Performed by: UROLOGY

## 2019-03-18 PROCEDURE — 77030020782 HC GWN BAIR PAWS FLX 3M -B

## 2019-03-18 PROCEDURE — 74011250636 HC RX REV CODE- 250/636: Performed by: ANESTHESIOLOGY

## 2019-03-18 PROCEDURE — 80048 BASIC METABOLIC PNL TOTAL CA: CPT

## 2019-03-18 PROCEDURE — 76010000138 HC OR TIME 0.5 TO 1 HR: Performed by: UROLOGY

## 2019-03-18 PROCEDURE — 74011636320 HC RX REV CODE- 636/320: Performed by: UROLOGY

## 2019-03-18 PROCEDURE — 76060000032 HC ANESTHESIA 0.5 TO 1 HR: Performed by: UROLOGY

## 2019-03-18 PROCEDURE — 77030008684 HC TU ET CUF COVD -B: Performed by: NURSE ANESTHETIST, CERTIFIED REGISTERED

## 2019-03-18 PROCEDURE — 77030018832 HC SOL IRR H20 ICUM -A: Performed by: UROLOGY

## 2019-03-18 PROCEDURE — 76210000016 HC OR PH I REC 1 TO 1.5 HR: Performed by: UROLOGY

## 2019-03-18 PROCEDURE — 74420 UROGRAPHY RTRGR +-KUB: CPT

## 2019-03-18 PROCEDURE — 81001 URINALYSIS AUTO W/SCOPE: CPT

## 2019-03-18 DEVICE — URETERAL STENT
Type: IMPLANTABLE DEVICE | Site: URETER | Status: FUNCTIONAL
Brand: POLARIS™ ULTRA

## 2019-03-18 RX ORDER — SODIUM CHLORIDE, SODIUM LACTATE, POTASSIUM CHLORIDE, CALCIUM CHLORIDE 600; 310; 30; 20 MG/100ML; MG/100ML; MG/100ML; MG/100ML
25 INJECTION, SOLUTION INTRAVENOUS CONTINUOUS
Status: DISCONTINUED | OUTPATIENT
Start: 2019-03-18 | End: 2019-03-18 | Stop reason: HOSPADM

## 2019-03-18 RX ORDER — SODIUM CHLORIDE 0.9 % (FLUSH) 0.9 %
5-40 SYRINGE (ML) INJECTION EVERY 8 HOURS
Status: DISCONTINUED | OUTPATIENT
Start: 2019-03-18 | End: 2019-03-18 | Stop reason: HOSPADM

## 2019-03-18 RX ORDER — PROPOFOL 10 MG/ML
INJECTION, EMULSION INTRAVENOUS AS NEEDED
Status: DISCONTINUED | OUTPATIENT
Start: 2019-03-18 | End: 2019-03-18 | Stop reason: HOSPADM

## 2019-03-18 RX ORDER — ONDANSETRON 2 MG/ML
INJECTION INTRAMUSCULAR; INTRAVENOUS AS NEEDED
Status: DISCONTINUED | OUTPATIENT
Start: 2019-03-18 | End: 2019-03-18 | Stop reason: HOSPADM

## 2019-03-18 RX ORDER — FENTANYL CITRATE 50 UG/ML
INJECTION, SOLUTION INTRAMUSCULAR; INTRAVENOUS AS NEEDED
Status: DISCONTINUED | OUTPATIENT
Start: 2019-03-18 | End: 2019-03-18 | Stop reason: HOSPADM

## 2019-03-18 RX ORDER — SODIUM CHLORIDE 0.9 % (FLUSH) 0.9 %
5-40 SYRINGE (ML) INJECTION AS NEEDED
Status: DISCONTINUED | OUTPATIENT
Start: 2019-03-18 | End: 2019-03-18 | Stop reason: HOSPADM

## 2019-03-18 RX ORDER — PHENYLEPHRINE HCL IN 0.9% NACL 0.4MG/10ML
SYRINGE (ML) INTRAVENOUS AS NEEDED
Status: DISCONTINUED | OUTPATIENT
Start: 2019-03-18 | End: 2019-03-18 | Stop reason: HOSPADM

## 2019-03-18 RX ORDER — LIDOCAINE HYDROCHLORIDE 10 MG/ML
0.1 INJECTION, SOLUTION EPIDURAL; INFILTRATION; INTRACAUDAL; PERINEURAL AS NEEDED
Status: DISCONTINUED | OUTPATIENT
Start: 2019-03-18 | End: 2019-03-18 | Stop reason: HOSPADM

## 2019-03-18 RX ORDER — ESMOLOL HYDROCHLORIDE 10 MG/ML
INJECTION INTRAVENOUS AS NEEDED
Status: DISCONTINUED | OUTPATIENT
Start: 2019-03-18 | End: 2019-03-18 | Stop reason: HOSPADM

## 2019-03-18 RX ORDER — CEPHALEXIN 500 MG/1
500 CAPSULE ORAL 3 TIMES DAILY
Qty: 9 CAP | Refills: 0 | Status: SHIPPED | OUTPATIENT
Start: 2019-03-18 | End: 2019-03-21

## 2019-03-18 RX ORDER — DEXAMETHASONE SODIUM PHOSPHATE 4 MG/ML
INJECTION, SOLUTION INTRA-ARTICULAR; INTRALESIONAL; INTRAMUSCULAR; INTRAVENOUS; SOFT TISSUE AS NEEDED
Status: DISCONTINUED | OUTPATIENT
Start: 2019-03-18 | End: 2019-03-18 | Stop reason: HOSPADM

## 2019-03-18 RX ORDER — FENTANYL CITRATE 50 UG/ML
25 INJECTION, SOLUTION INTRAMUSCULAR; INTRAVENOUS
Status: DISCONTINUED | OUTPATIENT
Start: 2019-03-18 | End: 2019-03-18 | Stop reason: HOSPADM

## 2019-03-18 RX ORDER — LIDOCAINE HYDROCHLORIDE 20 MG/ML
INJECTION, SOLUTION EPIDURAL; INFILTRATION; INTRACAUDAL; PERINEURAL AS NEEDED
Status: DISCONTINUED | OUTPATIENT
Start: 2019-03-18 | End: 2019-03-18 | Stop reason: HOSPADM

## 2019-03-18 RX ORDER — GLYCOPYRROLATE 0.2 MG/ML
INJECTION INTRAMUSCULAR; INTRAVENOUS AS NEEDED
Status: DISCONTINUED | OUTPATIENT
Start: 2019-03-18 | End: 2019-03-18 | Stop reason: HOSPADM

## 2019-03-18 RX ORDER — DIPHENHYDRAMINE HYDROCHLORIDE 50 MG/ML
12.5 INJECTION, SOLUTION INTRAMUSCULAR; INTRAVENOUS
Status: DISCONTINUED | OUTPATIENT
Start: 2019-03-18 | End: 2019-03-18 | Stop reason: HOSPADM

## 2019-03-18 RX ORDER — SODIUM CHLORIDE 9 MG/ML
25 INJECTION, SOLUTION INTRAVENOUS CONTINUOUS
Status: DISCONTINUED | OUTPATIENT
Start: 2019-03-18 | End: 2019-03-18 | Stop reason: HOSPADM

## 2019-03-18 RX ORDER — NEOSTIGMINE METHYLSULFATE 1 MG/ML
INJECTION INTRAVENOUS AS NEEDED
Status: DISCONTINUED | OUTPATIENT
Start: 2019-03-18 | End: 2019-03-18 | Stop reason: HOSPADM

## 2019-03-18 RX ORDER — MIDAZOLAM HYDROCHLORIDE 1 MG/ML
INJECTION, SOLUTION INTRAMUSCULAR; INTRAVENOUS AS NEEDED
Status: DISCONTINUED | OUTPATIENT
Start: 2019-03-18 | End: 2019-03-18 | Stop reason: HOSPADM

## 2019-03-18 RX ORDER — MORPHINE SULFATE 10 MG/ML
2 INJECTION, SOLUTION INTRAMUSCULAR; INTRAVENOUS
Status: DISCONTINUED | OUTPATIENT
Start: 2019-03-18 | End: 2019-03-18 | Stop reason: HOSPADM

## 2019-03-18 RX ORDER — ESMOLOL HYDROCHLORIDE 10 MG/ML
INJECTION, SOLUTION INTRAVENOUS
Status: DISCONTINUED | OUTPATIENT
Start: 2019-03-18 | End: 2019-03-18

## 2019-03-18 RX ORDER — ROCURONIUM BROMIDE 10 MG/ML
INJECTION, SOLUTION INTRAVENOUS AS NEEDED
Status: DISCONTINUED | OUTPATIENT
Start: 2019-03-18 | End: 2019-03-18 | Stop reason: HOSPADM

## 2019-03-18 RX ORDER — ONDANSETRON 2 MG/ML
4 INJECTION INTRAMUSCULAR; INTRAVENOUS AS NEEDED
Status: DISCONTINUED | OUTPATIENT
Start: 2019-03-18 | End: 2019-03-18 | Stop reason: HOSPADM

## 2019-03-18 RX ADMIN — NEOSTIGMINE METHYLSULFATE 5 MG: 1 INJECTION INTRAVENOUS at 11:02

## 2019-03-18 RX ADMIN — PROPOFOL 50 MG: 10 INJECTION, EMULSION INTRAVENOUS at 10:39

## 2019-03-18 RX ADMIN — LIDOCAINE HYDROCHLORIDE 80 MG: 20 INJECTION, SOLUTION EPIDURAL; INFILTRATION; INTRACAUDAL; PERINEURAL at 10:36

## 2019-03-18 RX ADMIN — CEFTRIAXONE SODIUM 2 G: 1 INJECTION, POWDER, FOR SOLUTION INTRAMUSCULAR; INTRAVENOUS at 10:44

## 2019-03-18 RX ADMIN — GLYCOPYRROLATE 0.8 MG: 0.2 INJECTION INTRAMUSCULAR; INTRAVENOUS at 11:02

## 2019-03-18 RX ADMIN — DEXAMETHASONE SODIUM PHOSPHATE 8 MG: 4 INJECTION, SOLUTION INTRA-ARTICULAR; INTRALESIONAL; INTRAMUSCULAR; INTRAVENOUS; SOFT TISSUE at 10:44

## 2019-03-18 RX ADMIN — ONDANSETRON 4 MG: 2 INJECTION INTRAMUSCULAR; INTRAVENOUS at 10:44

## 2019-03-18 RX ADMIN — FENTANYL CITRATE 100 MCG: 50 INJECTION, SOLUTION INTRAMUSCULAR; INTRAVENOUS at 10:36

## 2019-03-18 RX ADMIN — Medication 80 MCG: at 10:56

## 2019-03-18 RX ADMIN — ESMOLOL HYDROCHLORIDE 30 MG: 10 INJECTION INTRAVENOUS at 11:06

## 2019-03-18 RX ADMIN — SODIUM CHLORIDE 25 ML/HR: 900 INJECTION, SOLUTION INTRAVENOUS at 09:29

## 2019-03-18 RX ADMIN — PROPOFOL 250 MG: 10 INJECTION, EMULSION INTRAVENOUS at 10:36

## 2019-03-18 RX ADMIN — MIDAZOLAM HYDROCHLORIDE 2 MG: 1 INJECTION, SOLUTION INTRAMUSCULAR; INTRAVENOUS at 10:29

## 2019-03-18 RX ADMIN — Medication 80 MCG: at 10:48

## 2019-03-18 RX ADMIN — ROCURONIUM BROMIDE 30 MG: 10 INJECTION, SOLUTION INTRAVENOUS at 10:36

## 2019-03-18 NOTE — DISCHARGE INSTRUCTIONS
Post-operative Instructions from Dr. Fatimah Goddard    Diet:   [x]       Start with clear liquids and advance to small meals as tolerated. Activity:   [x]       Unrestricted   [x]       Light activity for 48 hours   []       No driving while using pain medications    Special Instructions:   [x]       You may have some blood in the urine, frequent urination, and burning with urination for several days. [x]       You have a ureteral stent in place and may experience occasional discomfort in the kidney area on that side, especially when urinating.   []       You have a mcclain catheter in place. See below for instructions on care of the catheter. Medications:   [x]       Resume previous medications upon discharge except blood thinning medications (e.g. aspirin, coumadin, plavix). Blood thinning medications may be resumed in 7 days if your urine is clear unless otherwise instructed by the prescribing physician.   []       You have been given a prescription for pain medication. This should be used sparingly as needed. Keep in mind that it may cause constipation.   []       You have been given a prescription for medication to help with bladder discomfort. Use this as needed for burning with urination or feelings of urgency. Call for:   [x]       Fever > 101 degrees F   [x]       Uncontrolled pain, nausea or vomiting   []       Abdominal swelling   []       Catheter not draining   [x]       Large clots or persistent heavy blood in the urine   []       Unable to urinate      Follow-up as scheduled.

## 2019-03-18 NOTE — PROGRESS NOTES
Patient discharged to home. Iv removed. Discharge instructions, script, and medication education done with patient and son.   Antibiotics called into pharmacy per md.

## 2019-03-18 NOTE — PERIOP NOTES
Handoff Report from Operating Room to PACU    Report received from Charlie Corey RN and Keenan Samuel CRNA regarding Leonid Avila. Surgeon(s):  Toñito Corbin II, MD  And Procedure(s) (LRB):  RIGHT CYSTOSCOPY WITH RETRO GRADES AND  STENT PLACEMENT (Right)  confirmed   with dressings discussed. Anesthesia type, drugs, patient history, complications, estimated blood loss, vital signs, intake and output, and last pain medication, lines, reversal medications and temperature were reviewed.

## 2019-03-18 NOTE — ANESTHESIA POSTPROCEDURE EVALUATION
Procedure(s):  RIGHT CYSTOSCOPY WITH RETRO GRADES AND  STENT PLACEMENT. Anesthesia Post Evaluation        Patient location during evaluation: PACU  Note status: Adequate. Level of consciousness: responsive to verbal stimuli and sleepy but conscious  Pain management: satisfactory to patient  Airway patency: patent  Anesthetic complications: no  Cardiovascular status: acceptable  Respiratory status: acceptable  Hydration status: acceptable  Comments: +Post-Anesthesia Evaluation and Assessment    Patient: Moy De La Garza MRN: [id number]  SSN: [id number]   YOB: 1960  Age: 63 y.o. Sex: female      Cardiovascular Function/Vital Signs    /76   Pulse 79   Temp 36.4 °C (97.6 °F)   Resp 23   Ht 5' 9.5\" (1.765 m)   Wt 139.8 kg (308 lb 3.3 oz)   SpO2 96%   BMI 44.86 kg/m²     Patient is status post Procedure(s):  RIGHT CYSTOSCOPY WITH RETRO GRADES AND  STENT PLACEMENT. Nausea/Vomiting: Controlled. Postoperative hydration reviewed and adequate. Pain:  Pain Scale 1: Numeric (0 - 10) (03/18/19 1200)  Pain Intensity 1: 0 (03/18/19 1200)   Managed. Neurological Status:   Neuro (WDL): Exceptions to WDL (03/18/19 1114)   At baseline. Mental Status and Level of Consciousness: Arousable. Pulmonary Status:   O2 Device: Room air (03/18/19 1135)   Adequate oxygenation and airway patent. Complications related to anesthesia: None    Post-anesthesia assessment completed. No concerns.     Signed By: Clara Koehler MD    3/18/2019  Post anesthesia nausea and vomiting:  controlled      Visit Vitals  /76   Pulse 79   Temp 36.4 °C (97.6 °F)   Resp 23   Ht 5' 9.5\" (1.765 m)   Wt 139.8 kg (308 lb 3.3 oz)   SpO2 96%   BMI 44.86 kg/m²

## 2019-03-18 NOTE — BRIEF OP NOTE
BRIEF OPERATIVE NOTE    Date of Procedure: 3/18/2019   Preoperative Diagnosis: HYDRONEPHROSIS  Postoperative Diagnosis: HYDRONEPHROSIS    Procedure(s):  RIGHT CYSTOSCOPY WITH RETRO GRADES AND  STENT PLACEMENT  Surgeon(s) and Role:     * Eden Corbin MD - Primary         Surgical Assistant: none    Surgical Staff:  Circ-1: Collin Winter RN  Scrub Tech-1: Laisha KEY  Event Time In Time Out   Incision Start 1051    Incision Close 1058      Anesthesia: General   Estimated Blood Loss: 0  Specimens: * No specimens in log *   Findings: hydronephrosis   Complications: none  Implants: * No implants in log *

## 2019-03-18 NOTE — ANESTHESIA PREPROCEDURE EVALUATION
Anesthetic History     PONV          Review of Systems / Medical History  Patient summary reviewed, nursing notes reviewed and pertinent labs reviewed    Pulmonary        Sleep apnea: CPAP    Asthma : well controlled       Neuro/Psych         Psychiatric history     Cardiovascular    Hypertension: well controlled          CAD and hyperlipidemia    Exercise tolerance: <4 METS  Comments: Normal Nuclear Stress Test (3/1/17)  Thromboembolus    ECHO from 7/2018 showed a 55-60% EF with mild MR and trivial AR and TR   GI/Hepatic/Renal     GERD: well controlled    Renal disease: ARF and stones       Endo/Other    Diabetes: well controlled, type 2, using insulin  Hypothyroidism: well controlled  Morbid obesity, arthritis, cancer and anemia    Comments: Ovarian cancer, Stage IIIc s/p chemotherapy (2012, 2014)  Thrombocytopenia Other Findings   Comments: Fibromyalgia  Chronic pain         Physical Exam    Airway  Mallampati: I  TM Distance: > 6 cm  Neck ROM: normal range of motion   Mouth opening: Normal     Cardiovascular    Rhythm: regular  Rate: normal         Dental    Dentition: Lower dentition intact and Upper dentition intact     Pulmonary  Breath sounds clear to auscultation               Abdominal  GI exam deferred       Other Findings            Anesthetic Plan    ASA: 3  Anesthesia type: general    Monitoring Plan: BIS      Induction: Intravenous  Anesthetic plan and risks discussed with: Patient      Previous Grade 1 view

## 2019-03-19 LAB
BACTERIA SPEC CULT: NORMAL
CC UR VC: NORMAL
SERVICE CMNT-IMP: NORMAL

## 2019-03-19 NOTE — OP NOTES
[address]  OPERATIVE REPORT    Name:  Marilyn Dupree  MR#:  [id number]  :  1960  ACCOUNT #:  [de-identified]  DATE OF SERVICE:  2019      PREOPERATIVE DIAGNOSES:  1. Right hydronephrosis. 2.  Metastatic ovarian cancer. 3.  Acute kidney injury secondary to right hydronephrosis. POSTOPERATIVE DIAGNOSES:  1. Right hydronephrosis. 2.  Metastatic ovarian cancer. 3.  Acute kidney injury secondary to right hydronephrosis. PROCEDURE PERFORMED:  Cystoscopy, right retrograde pyelography, right ureteral stent placement (26 cm x 6-Moroccan double-J). SURGEON:  Zahida Willard II, MD    ASSISTANT:  None    ANESTHESIA:  General.    COMPLICATIONS: None    SPECIMENS REMOVED:  None    IMPLANTS:  None    ESTIMATED BLOOD LOSS:  0    INDICATION:  The patient is a 60-year-old -American female with metastatic ovarian carcinoma, who was noted to have moderate hydronephrosis by CT scanning and a rising creatinine up to 1.4. She was not really having any right flank pain. She met with Dr. Soumya Alexandra preoperatively and the decision was made to proceed with ureteral stenting to decompress the right collecting system. She understood the risks of bleeding and infection, and agreed to proceed. FINDINGS:  Cystoscopically, the bladder was otherwise normal without trabeculation, stones, or exophytic lesions. Retrograde pyelography revealed a normal distal ureter for approximately 5-6 cm before there was a very narrow segment 3-4 cm in length and then proximal hydroureteronephrosis with some tortuosity to the upper ureter suggesting chronic changes. The caliceal system was blunted. The stent was in good position at the conclusion of the procedure. PROCEDURE:  The patient was brought to the operating room, placed in a supine position, and placed under general anesthetic by the Anesthesia service. She received 2 g of Rocephin IV piggyback.   She was placed in the dorsal lithotomy position, prepped and draped in standard fashion. Cystoscopy was performed noting the above findings. An 8-Tamazight cone-tip catheter with Conray was used for right retrograde pyelography with the above findings noted. A straight-tipped 0.038-inch flexible-tip Glidewire was used to easily bypass the obstructing segment of the ureter and a nice curl was noted in the renal pelvis. A 26 cm x 6-Tamazight double-J ureteral stent was placed over the guidewire with the use of a pusher and a nice curl was noted in the renal pelvis as viewed fluoroscopically and a nice curl was noted in the bladder, off the trigone, as viewed cystoscopically. The bladder was emptied and the cystoscope was removed.       Joshua Shaikh M.D.      [id number]  D:  03/18/2019 14:29  T:  03/18/2019 20:52  JOB #:  [id number]

## 2019-03-20 ENCOUNTER — PATIENT OUTREACH (OUTPATIENT)
Dept: INTERNAL MEDICINE CLINIC | Age: 59
End: 2019-03-20

## 2019-03-20 DIAGNOSIS — C56.9 MALIGNANT NEOPLASM OF OVARY, UNSPECIFIED LATERALITY (HCC): Primary | ICD-10-CM

## 2019-03-20 NOTE — PROGRESS NOTES
Goals Addressed This Visit's Progress Chronic Disease  recurrent, metastatic ovarian cancer 2/21/2019  
- diagnosis of recurrent, metastatic ovarian cancer; chest wall mass positive for malignancy on biopsy - Metastatic high-grade serous carcinoma. Liver lesions. 
- Advance Medical Directive - on file - open to [facility]; had visit scheduled for today, however nurse did not show. Patient will contact Raman VILLASEÑOR to reschedule. - palliative treatment. started on [place] 1/23/19. Most recent office visit with Dr. Gabby Franks 2/7/19. 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 
- has office visit scheduled with Dr. Woody Raines (Urology) 2/8/19 
- to have cbc, cmp collected (ordered by Dr. Gabby Franks) today, however reports no transportation due to her car is in the shop until Monday and she does not have any family members/friends that are able to assist with transportation to/from lab. Patient will contact Dr. Abelino Villagomez office today to notify of barrier to having labs drawn today and for further recommendations. - patient agreeable to [facility]; will follow every two weeks 3/6/2019  
- had cbc, cmp drawn on 2/22/19 
- plans to have Right cystoscopy and right stent placement, however procedure scheduling is pending cardiac clearance; diagnosis of hydronephrosis due to obstruction of ureter 
- will attend office visit with Cardiology 3/14/19 for pre-op cardiac clearance 3/20/2019 
- s/p right cystoscopy w/ retrogrades and stent placement on 3/18/19 
- pt currently driving, therefore routine NN f/u outreach is deferred at this time to avoid distraction to patient while driving; pt denies immediate questions/concerns 
- NN will f/u with patient later this week Future Appointments: 
Future Appointments Date Time Provider Gabriel Phillip 3/21/2019  1:45 PM Glynn Kiran MD [address] [facility]  
4/11/2019  9:30 AM [facility]  
4/18/2019  2:00 PM MALLIKA [place] [facility] SCHED  
5/9/2019  9:30 AM BRE[facility] INFUSION NURSE 6 [facility]  
5/16/2019  2:00 PM Shon [place] [facility] SCHED  
5/21/2019 11:30 AM Tanika Diaz MD RDE LEY[facility] [address] [facility]  
7/16/2019  8:45 AM Mera Jaime MD [facility] Last Appointment With Me: 
Visit date not found Last Appointment My Department: 
1/15/2019

## 2019-03-21 ENCOUNTER — OFFICE VISIT (OUTPATIENT)
Dept: GYNECOLOGY | Age: 59
End: 2019-03-21

## 2019-03-21 VITALS
BODY MASS INDEX: 41.95 KG/M2 | WEIGHT: 293 LBS | HEIGHT: 70 IN | SYSTOLIC BLOOD PRESSURE: 78 MMHG | DIASTOLIC BLOOD PRESSURE: 54 MMHG | HEART RATE: 80 BPM

## 2019-03-21 DIAGNOSIS — C56.9 MALIGNANT NEOPLASM OF OVARY, UNSPECIFIED LATERALITY (HCC): Primary | ICD-10-CM

## 2019-03-21 DIAGNOSIS — C79.51 PAIN DUE TO MALIGNANT NEOPLASM METASTATIC TO BONE (HCC): ICD-10-CM

## 2019-03-21 DIAGNOSIS — R59.0 LYMPHADENOPATHY, MEDIASTINAL: ICD-10-CM

## 2019-03-21 DIAGNOSIS — E11.21 TYPE 2 DIABETES MELLITUS WITH NEPHROPATHY (HCC): ICD-10-CM

## 2019-03-21 DIAGNOSIS — G89.3 PAIN DUE TO MALIGNANT NEOPLASM METASTATIC TO BONE (HCC): ICD-10-CM

## 2019-03-21 DIAGNOSIS — R22.2 CHEST WALL MASS: ICD-10-CM

## 2019-03-21 NOTE — PROGRESS NOTES
one month follow up on Josh Esteban, labs to be drawn today, pt complains of eye pain, had a blood vessel break, saw eye doctor today    Initial blood pressure reading 167/98, repeat blood pressure reading 78/54      1. Have you been to the ER, urgent care clinic since your last visit? Hospitalized since your last visit? [id number] [address] [facility] on 3/18/19 for right ureteral stent placement    2. Have you seen or consulted any other health care providers outside of the [address] [place] [address] since your last visit? Include any pap smears or colon screening.  no

## 2019-03-21 NOTE — PROGRESS NOTES
[address], [address], [address]  P [phone]  F [phone]    Office Visit    Patient ID:  Name: Bharat Cruz  MRN: [id number]  : 1960/58 y.o. Visit date: 3/21/2019    LEONID Salinas is a 63 y.o.  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 The patient's previous treatment procedures include primary Taxol/carbo with retreatment in  and Doxil/Avastin, topotecan, gemzar and now single agent weekly Taxol initiated 18 following progression with chest wall involvement. In 2018, patient did not follow up and self-discontinued her treatment regimen. She presented to the ER on 18 with SOB. Negative workup for PE or MI. Admitted to Hospitalist service from -18. Managed for acute asthma exacerbation, acute FELICIA, and hyperkalemia. The patient was initiated on Adkins [place] on 19. Underwent repeat CT C/A/P on 19 for purposes of following response to treatment. Patient is tolerating Adkins Desi well for the first month, but then had a rise in her creatinine. However, she was also found to have hydronephrosis. Underwent right ureteral stent placement on 3/18/19. Presents today for consideration of restarting Zejula. Denies current shortness of breath. Does report some pain in her chest where the mass is located. Denies cough or hemoptysis. Denies nausea, vomiting, constipation or diarrhea. Had some constipation when she started Adkins Desi, but this has resolved with stool softeners. Denies fevers or chills. Does report that her appetite is a little less.       OncTx History:  12: MARAH/BSO/Cytoreductive surgery on 12 noted carcinomatosis/omental caking.    10/2012-2013: 6 cycles Carbo/Taxol  2014-2014: 6 Cycles carbo/Taxol  3/2015-5/13/15: 3 cycles Avastin/Doxil until progression  2015-2015 Weekly Topotecan 12/2015-3/2016: Weekly Topotecan  2/2017-9/2017Kelly Curls D1/D8 Q28 days for 6 cycles  4/2018: CT core needle bx chest wall mass: Metastatic high-grade serous carcinoma (see comment)   General Categorization   Positive for malignancy. 5/8/2018-Present: Taxol  C4,2,31; S/P  Cycle #4  8/14/2018. Patient discontinued treatment secondary to side effects and fatigue. 1/23/2019 - Initiated Randa [place]. Held do to rising creatinine. However, she was also found to have hydronephrosis. Underwent right ureteral stent placement on 3/18/19. Imaging Review:   CT C/A/P 2/5/19:  FINDINGS:   THYROID: No nodule. MEDIASTINUM: Mediastinal lymphadenopathy is unchanged. A reference superior  mediastinal lymph node is stable measuring 3.3 cm. SWATI: No mass or lymphadenopathy. THORACIC AORTA: No dissection or aneurysm. MAIN PULMONARY ARTERY: Normal in caliber. TRACHEA/BRONCHI: Patent. ESOPHAGUS: No wall thickening or dilatation. HEART: Normal in size. PLEURA: No effusion or pneumothorax. LUNGS: No nodule, mass, or airspace disease. LIVER: Evaluation of the liver is limited by lack of intravenous contrast. There  is a vague 4.4 cm hypodense lesion in the left hepatic lobe previously measuring  3.0 cm. Lesion in the right hepatic lobe now measures 3.3 cm, previously  measuring 2.7 cm. GALLBLADDER: Unremarkable. SPLEEN: Central soft tissue abnormality has increased in size, now measuring 4.9  cm, previously measuring 3.1 cm. PANCREAS: Not well evaluated given the massive lymphadenopathy and lack of  intravenous contrast.  ADRENALS: Unremarkable. KIDNEYS: There is no moderate right hydronephrosis. STOMACH: Unremarkable. SMALL BOWEL: No dilatation or wall thickening. COLON: No dilatation or wall thickening. APPENDIX: Not visualized. PERITONEUM: Bulky gastrohepatic, periceliac, portacaval, and mesenteric  lymphadenopathy has increased. There is secondary invasion of the liver.  Maximum  dimension in the upper abdomen is currently 10.4 cm, previously 9.3 cm. Elemental soft tissue nodules have increased in size, with a reference 7.0 cm  nodule in the left abdomen previously measuring 5.5 cm. Soft tissue nodule in  the deep pelvis between the bladder and colon has increased in size as well. RETROPERITONEUM: No lymphadenopathy or aortic aneurysm. REPRODUCTIVE ORGANS: The uterus and ovaries are surgically absent. URINARY BLADDER: No mass or calculus. BONES: Soft tissue mass involving the right sternum has not changed. Degenerative changes are seen in the thoracic and lumbar spine. ADDITIONAL COMMENTS: N/A  IMPRESSION  IMPRESSION:  Stable mediastinal lymphadenopathy. Progressive abdominal lymphadenopathy as  described above. Liver lesions have increased in size compared to the prior  exam. Stable right sternal mass. Moderate right hydroureteronephrosis is now  noted which appears to be related to the lymphadenopathy. ROS  Constitutional: no weight loss, fever, night sweats  Respiratory: no cough, shortness of breath, or wheezing  Cardiovascular: no chest pain or dyspnea on exertion. Reports chest wall mass smaller, no associated pain. Heme: No abnormal bleeding  Gastrointestinal: no abdominal pain, change in bowel habits, or black or bloody stools  Genito-Urinary: no dysuria, trouble voiding, or hematuria  Musculoskeletal: + swelling in ankle - bilateral, mostly only at end of day  Neurological: mild neuropathy  Derm: pigmentation/induration medial left leg. Prior injury from fall. Psych: positive for depression - controlled        PMH:  Past Medical History:   Diagnosis Date    Adverse effect of anesthesia     sleep apnea cpap machine useage     Anemia     Arthritis     DDD low back and knees    Asthma     uses albuterol prn only; none x 6 mos.   Never hospitalized    BRCA negative 1/2013    Calculus of kidney     Chronic kidney disease     kidney stones    Chronic pain     knee pain bilat    CINV (chemotherapy-induced nausea and vomiting) 2014    Decreased hearing, right     PATIENT STATES THIS IS DUE TO CHEMOTHERAPY    Diabetes ([facility] [address] Age 46    IDDM    Environmental allergies     Fibromyalgia     GERD (gastroesophageal reflux disease)     controlled with med    Hx of pulmonary embolus during pregnancy 2015    Hydronephrosis due to obstruction of ureter 3/18/2019    Hypertension     Ill-defined condition     Sepsis  -  SOURCE PORT    Ill-defined condition 2016    chemotherapy     Mass of chest wall 2018    Morbid obesity (HCC)     Murmur, heart     Other and unspecified hyperlipidemia     Ovarian cancer ([address] 2012, 2014    serous, high grade, stage IIIc. s/p chemotx (has port)    Psychiatric disorder     Depression/Anxiety    Rectal bleeding     Thromboembolus ([address]     POST PARTUM; resolved- PELVIS    Thyroid disease     HYPOTHYROID    Unspecified sleep apnea     CPAP, Dr. Liz Guo     PSH:  Past Surgical History:   Procedure Laterality Date    BREAST SURGERY PROCEDURE UNLISTED      Lymph node in left breast 2017   [place]     X2    HX HYSTERECTOMY  2012    ROULA/BSO, tumor debulking, omentectomy for ovarian cancer    HX ORTHOPAEDIC Right     ankle-FX, R    HX ORTHOPAEDIC Right     FX R ARM    HX UROLOGICAL Right     STENT FOR KIDNEY STONES    HX VASCULAR ACCESS  2015    right chest- port placed    HX VASCULAR ACCESS Left 2017    TETE CATH     SOC:  Social History     Socioeconomic History    Marital status:      Spouse name: Not on file    Number of children: Not on file    Years of education: Not on file    Highest education level: Not on file   Occupational History    Not on file   Social Needs    Financial resource strain: Not on file    Food insecurity:     Worry: Not on file     Inability: Not on file    Transportation needs:     Medical: Not on file     Non-medical: Not on file   Tobacco Use  Smoking status: Never Smoker    Smokeless tobacco: Never Used   Substance and Sexual Activity    Alcohol use: Yes     Comment: 1 drink per month    Drug use: No    Sexual activity: Yes   Lifestyle    Physical activity:     Days per week: Not on file     Minutes per session: Not on file    Stress: Not on file   Relationships    Social connections:     Talks on phone: Not on file     Gets together: Not on file     Attends [employer]ism service: Not on file     Active member of club or organization: Not on file     Attends meetings of clubs or organizations: Not on file     Relationship status: Not on file    Intimate partner violence:     Fear of current or ex partner: Not on file     Emotionally abused: Not on file     Physically abused: Not on file     Forced sexual activity: Not on file   Other Topics Concern    Not on file   Social History Narrative    Lives in [facility] alone.  passed away in 5/16 of a heart attack. Has 2 sons. Disability. 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 Enjoys swimming and going to the gym. Family History  Family History   Problem Relation Age of Onset    Hypertension Mother     Arthritis-osteo Mother     Hypertension Father     Arthritis-osteo Father     Cancer Father         PROSTATE    COPD Father     Hypertension Brother     Elevated Lipids Brother     Arthritis-osteo Brother     Hypertension Brother     Elevated Lipids Brother     Obesity Brother     Cancer Brother         PROSTATE    Anesth Problems Son         DELAYED AWAKENING    Diabetes Maternal Grandmother     Anesth Problems Paternal Grandmother         PATIENT [address], [address] STOPPED DURING SURGERY     Medications: (reviewed)  Current Outpatient Medications on File Prior to Visit   Medication Sig Dispense Refill    cephALEXin (KEFLEX) 500 mg capsule Take 1 Cap by mouth three (3) times daily for 3 days.  9 Cap 0    LINZESS 145 mcg cap capsule TAKE 1 CAPSULE BY MOUTH EVERY DAYY 30 Cap 0    ZEJULA 100 mg cap Take 100 mg by mouth two (2) times a day.  fluticasone propionate (FLONASE) 50 mcg/actuation nasal spray       glucose blood VI test strips (TRUE METRIX GLUCOSE TEST STRIP) strip by Does Not Apply route See Admin Instructions. 30 Strip 5    lancets misc by Does Not Apply route daily. True Metrix lancets- test blood sugar once per day 30 Each 5    Blood-Glucose Meter monitoring kit Check up to tid, as directed 1 Kit 0    levothyroxine (SYNTHROID) 150 mcg tablet Take 1 Tab by mouth Daily (before breakfast). 90 Tab 0    azelastine (ASTELIN) 137 mcg (0.1 %) nasal spray 1 [place] by Both Nostrils route daily as needed. Use in each nostril as directed       insulin detemir U-100 (LEVEMIR) 100 unit/mL injection 20 Units by SubCUTAneous route daily (with lunch).  montelukast (SINGULAIR) 10 mg tablet Take 10 mg by mouth nightly.  ascorbic acid, vitamin C, (VITAMIN C) 1,000 mg tablet Take 1,000 mg by mouth daily.  albuterol (PROVENTIL VENTOLIN) 2.5 mg /3 mL (0.083 %) nebulizer solution 2.5 mg by Nebulization route every four (4) hours as needed for Wheezing.  potassium chloride (KLOR-CON M10) 10 mEq tablet TAKE 1 TAB BY MOUTH TWO (2) DAYS A WEEK. WED AND FRI 90 Tab 1    telmisartan (MICARDIS) 20 mg tablet Take 1 Tab by mouth daily. 90 Tab 3    insulin aspart U-100 (NOVOLOG FLEXPEN U-100 INSULIN) 100 unit/mL inpn INJECT 20 UNITS BEFORE EACH MEAL + 4 UNITS FOR EVERY 50 MG/DL ABOVE 150 MG/DL.  UNITS PER DAY 30 mL 11    omega-3 acid ethyl esters (LOVAZA) 1 gram capsule TAKE ONE CAPSULE BY MOUTH TWICE A  Cap 2    Dexlansoprazole (DEXILANT) 60 mg CpDB Take 1 Cap by mouth daily. 90 Cap 3    Liraglutide (VICTOZA) 0.6 mg/0.1 mL (18 mg/3 mL) pnij 1.8 mg by SubCUTAneous route daily (with lunch). 3 Pen 3    SYMBICORT 160-4.5 mcg/actuation HFAA Take 2 Puffs by inhalation two (2) times daily as needed.  2 Inhaler 3    clonazePAM (KLONOPIN) 0.5 mg tablet Take 0.5 mg by mouth nightly as needed.  sertraline (ZOLOFT) 50 mg tablet TAKE 1 TABLET BY MOUTH as needed  1    cholecalciferol, VITAMIN D3, (VITAMIN D3) 5,000 unit tab tablet Take 5,000 Units by mouth daily.  furosemide (LASIX) 40 mg tablet Take 40 mg by mouth every other day. 3    EPINEPHrine (EPIPEN) 0.3 mg/0.3 mL injection INJECT 0.3 ML BY INTRAMUSCULAR ROUTE ONCE AS NEEDED FOR UP TO 1 DOSE.  1    amoxicillin (AMOXIL) 500 mg capsule Prior to dental procedures      oxyCODONE IR (ROXICODONE) 5 mg immediate release tablet Take 1 Tab by mouth every four (4) hours as needed for Pain. Max Daily Amount: 30 mg. 45 Tab 0    ondansetron (ZOFRAN ODT) 4 mg disintegrating tablet Take 1 Tab by mouth every eight (8) hours as needed for Nausea. 30 Tab 2     No current facility-administered medications on file prior to visit. Allergies: (reviewed)  Allergies   Allergen Reactions    Carboplatin Other (comments)     Patient developed shortness of breath, felt faint, coughing, skin flushed and \"itchy\". This occurred on the patients 8th treatment.  Iodinated Contrast- Oral And Iv Dye Rash     13 hr pre-medication prior to IV contrast.   Patient has done well with 1 hr pre-medication of (Solu-Medrol) 40mg &  (Benadryl) 50mg.  Lipitor [Atorvastatin] Myalgia    Shellfish Containing Products Hives    Tape [Adhesive] Itching and Other (comments)     \"op site-- clear,thin tape\"--caused blister     Tomato Hives    Olmesartan Other (comments)    Losartan Other (comments)     headaches    Percocet [Oxycodone-Acetaminophen] Other (comments)     Fever; however, current home med       OBJECTIVE  Physical Exam  Visit Vitals  BP (!) 78/54 (BP 1 Location: Left arm, BP Patient Position: Sitting)   Pulse 80   Ht 5' 9.5\" (1.765 m)   Wt 316 lb (143.3 kg)   LMP 09/07/2011   BMI 46.00 kg/m²     Physical Exam:  General: Alert and oriented. No acute distress. Well-nourished  HEENT: No thyroid enlargment.  Neck supple without restrictions. Sclera normal. Normal occular motion. Moist mucous membranes. Lymphatics: No evidence of axillary, cervical, or subclavicular adenopathy. Respiratory: clear to auscultation and percussion to the bases. No CVAT. No discernable chest wall mass on palpation, but clear on imaging. Cardiovascular: regular rate and rhythm. No murmurs, rubs, or gallops. Gastrointestinal: soft, non-tender, non-distended, no masses or organomegaly. Well-healed incision. Musculoskeletal: normal gait. No joint tenderness, deformity or swelling. No muscular tenderness. Extremities: extremities normal, atraumatic, mild 1+ edema bilaterally. Pelvic: deferred today  Neuro: Grossly intact. Normal gait and movement. No acute deficit  Skin: No evidence of rashes or skin changes. DATE REVIEW as available:  Lab Results   Component Value Date/Time    WBC 7.4 03/01/2019 03:30 PM    Hemoglobin (POC) 10.5 (L) 03/20/2017 12:15 PM    HGB 8.4 (L) 03/01/2019 03:30 PM    Hematocrit (POC) 31 (L) 03/20/2017 12:15 PM    HCT 28.7 (L) 03/01/2019 03:30 PM    PLATELET 255 90/46/1389 03:30 PM    MCV 98.6 03/01/2019 03:30 PM     Lab Results   Component Value Date/Time    ABS. NEUTROPHILS 4.4 03/01/2019 03:30 PM     Lab Results   Component Value Date/Time    Sodium 142 03/18/2019 09:31 AM    Potassium 5.2 (H) 03/18/2019 09:31 AM    Chloride 112 (H) 03/18/2019 09:31 AM    CO2 23 03/18/2019 09:31 AM    Anion gap 7 03/18/2019 09:31 AM    Glucose 108 (H) 03/18/2019 09:31 AM    BUN 26 (H) 03/18/2019 09:31 AM    Creatinine 1.26 (H) 03/18/2019 09:31 AM    BUN/Creatinine ratio 21 (H) 03/18/2019 09:31 AM    GFR est AA 53 (L) 03/18/2019 09:31 AM    GFR est non-AA 44 (L) 03/18/2019 09:31 AM    Calcium 9.4 03/18/2019 09:31 AM    Bilirubin, total 0.4 02/22/2019 11:27 AM    AST (SGOT) 35 02/22/2019 11:27 AM    Alk.  phosphatase 93 02/22/2019 11:27 AM    Protein, total 7.9 02/22/2019 11:27 AM    Albumin 4.0 02/22/2019 11:27 AM    Globulin 5.6 (H) 02/14/2019 09:41 AM    A-G Ratio 1.0 (L) 02/22/2019 11:27 AM    ALT (SGPT) 18 02/22/2019 11:27 AM       ECHO 7/17/18  Left ventricle: Systolic function was normal. Ejection fraction was estimated in the range of 55 % to 60 %. There were no regional wall motion  abnormalities. Wall thickness was mildly increased. Left atrium: The atrium was mildly dilated. Mitral valve: There was mild regurgitation. Aortic valve: Leaflets exhibited sclerosis without stenosis. Not well visualized. IMPRESSION AND PLAN:  Ms. Gwen Acevedo is a 63 y.o. female with recurrent, metastatic ovarian cancer. Heavily pre-treated. Lost to follow-up since 9/2018, at which time patient self-discontinued weekly paclitaxel. Initiated Juanetta Drier on 1/23/19. Held after 1 month secondary to rising creatinine. Found to have right hydronephrosis. Right ureteral stent placed 3/18/19 with normalization of creatinine. ECOG 1    Problem List Items Addressed This Visit        Endocrine    Type 2 diabetes mellitus with nephropathy (HCC)       Immune    Lymphadenopathy, mediastinal       Reproductive    Ovarian cancer ([facility] [address] - Primary       Other    Chest wall mass    Pain due to malignant neoplasm metastatic to bone [facility])            Reviewed patient's course to date. Will follow-up today's labs. If appropriate will restart Zejula. Suspect some of her rising creatinine was secondary to hydronephrosis. Will monitor labs weekly for this month while restarting Zejula. While the patient is BRCA negative, there is some evidence that PARPi may provide some benefit to BRCA wild-type patients. Again, I discussed goals of care, including that any type of treatment at this time is palliative in nature. I have reviewed her chemotherapy history, which is extensive; although the patient has been somewhat non-compliant with treatments with missed treatments \"here and there\". Stressed importance of remaining on treatment.  Discussed that palliative therapy at this time has a response rate at best of 5-10% regardless of the type of treatment. Counseled patient regarding code status and advance care directives, which the patient is going to consider. She reports she has a \"living will\", but I am unsure as to her wishes regarding code status. Will plan to continue this dialog at future visits. All questions and concerns were addressed with the patient and she is comfortable with the plan.     Connor Osborne MD

## 2019-03-22 LAB
ALBUMIN SERPL-MCNC: 3.8 G/DL (ref 3.5–5.5)
ALBUMIN/GLOB SERPL: 1 {RATIO} (ref 1.2–2.2)
ALP SERPL-CCNC: 74 IU/L (ref 39–117)
ALT SERPL-CCNC: 15 IU/L (ref 0–32)
AST SERPL-CCNC: 23 IU/L (ref 0–40)
BASOPHILS # BLD AUTO: 0 X10E3/UL (ref 0–0.2)
BASOPHILS NFR BLD AUTO: 0 %
BILIRUB SERPL-MCNC: <0.2 MG/DL (ref 0–1.2)
BUN SERPL-MCNC: 27 MG/DL (ref 6–24)
BUN/CREAT SERPL: 21 (ref 9–23)
CALCIUM SERPL-MCNC: 9.1 MG/DL (ref 8.7–10.2)
CANCER AG125 SERPL-ACNC: 319 U/ML (ref 0–38.1)
CHLORIDE SERPL-SCNC: 110 MMOL/L (ref 96–106)
CO2 SERPL-SCNC: 20 MMOL/L (ref 20–29)
CREAT SERPL-MCNC: 1.28 MG/DL (ref 0.57–1)
EOSINOPHIL # BLD AUTO: 0.5 X10E3/UL (ref 0–0.4)
EOSINOPHIL NFR BLD AUTO: 6 %
ERYTHROCYTE [DISTWIDTH] IN BLOOD BY AUTOMATED COUNT: 15.6 % (ref 12.3–15.4)
GLOBULIN SER CALC-MCNC: 4 G/DL (ref 1.5–4.5)
GLUCOSE SERPL-MCNC: 134 MG/DL (ref 65–99)
HCT VFR BLD AUTO: 26.1 % (ref 34–46.6)
HGB BLD-MCNC: 8.4 G/DL (ref 11.1–15.9)
IMM GRANULOCYTES # BLD AUTO: 0 X10E3/UL (ref 0–0.1)
IMM GRANULOCYTES NFR BLD AUTO: 0 %
LYMPHOCYTES # BLD AUTO: 1.9 X10E3/UL (ref 0.7–3.1)
LYMPHOCYTES NFR BLD AUTO: 24 %
MAGNESIUM SERPL-MCNC: 1.6 MG/DL (ref 1.6–2.3)
MCH RBC QN AUTO: 28.4 PG (ref 26.6–33)
MCHC RBC AUTO-ENTMCNC: 32.2 G/DL (ref 31.5–35.7)
MCV RBC AUTO: 88 FL (ref 79–97)
MONOCYTES # BLD AUTO: 0.9 X10E3/UL (ref 0.1–0.9)
MONOCYTES NFR BLD AUTO: 12 %
NEUTROPHILS # BLD AUTO: 4.6 X10E3/UL (ref 1.4–7)
NEUTROPHILS NFR BLD AUTO: 58 %
PLATELET # BLD AUTO: 188 X10E3/UL (ref 150–379)
POTASSIUM SERPL-SCNC: 4.8 MMOL/L (ref 3.5–5.2)
PROT SERPL-MCNC: 7.8 G/DL (ref 6–8.5)
RBC # BLD AUTO: 2.96 X10E6/UL (ref 3.77–5.28)
SODIUM SERPL-SCNC: 144 MMOL/L (ref 134–144)
WBC # BLD AUTO: 8 X10E3/UL (ref 3.4–10.8)

## 2019-03-25 ENCOUNTER — TELEPHONE (OUTPATIENT)
Dept: GYNECOLOGY | Age: 59
End: 2019-03-25

## 2019-03-25 DIAGNOSIS — C56.9 MALIGNANT NEOPLASM OF OVARY, UNSPECIFIED LATERALITY (HCC): Primary | ICD-10-CM

## 2019-03-25 NOTE — PROGRESS NOTES
Lab req faxed to [facility] to be drawn on 3/28/19 after 1st week of Zejula per vo Dr. Melody Santana.

## 2019-03-25 NOTE — TELEPHONE ENCOUNTER
I called lab results from 3/21/19 to pt, and she states she started back on Zejula 100mg 2 tabs daily on 3/23/19. She will have labs repeated on 3/28/19.

## 2019-03-26 ENCOUNTER — PATIENT OUTREACH (OUTPATIENT)
Dept: INTERNAL MEDICINE CLINIC | Age: 59
End: 2019-03-26
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
## 2019-03-29 ENCOUNTER — TELEPHONE (OUTPATIENT)
Dept: GYNECOLOGY | Age: 59
End: 2019-03-29

## 2019-03-29 LAB
ALBUMIN SERPL-MCNC: 4.2 G/DL (ref 3.5–5.5)
ALBUMIN SERPL-MCNC: 4.2 G/DL (ref 3.5–5.5)
ALBUMIN/CREAT UR: 1708.9 MG/G CREAT (ref 0–30)
ALBUMIN/GLOB SERPL: 1 {RATIO} (ref 1.2–2.2)
ALBUMIN/GLOB SERPL: 1.1 {RATIO} (ref 1.2–2.2)
ALP SERPL-CCNC: 88 IU/L (ref 39–117)
ALP SERPL-CCNC: 88 IU/L (ref 39–117)
ALT SERPL-CCNC: 12 IU/L (ref 0–32)
ALT SERPL-CCNC: 14 IU/L (ref 0–32)
AST SERPL-CCNC: 23 IU/L (ref 0–40)
AST SERPL-CCNC: 27 IU/L (ref 0–40)
BASOPHILS # BLD AUTO: 0 X10E3/UL (ref 0–0.2)
BASOPHILS # BLD AUTO: 0 X10E3/UL (ref 0–0.2)
BASOPHILS NFR BLD AUTO: 0 %
BASOPHILS NFR BLD AUTO: 0 %
BILIRUB SERPL-MCNC: 0.4 MG/DL (ref 0–1.2)
BILIRUB SERPL-MCNC: 0.4 MG/DL (ref 0–1.2)
BUN SERPL-MCNC: 22 MG/DL (ref 6–24)
BUN SERPL-MCNC: 22 MG/DL (ref 6–24)
BUN/CREAT SERPL: 14 (ref 9–23)
BUN/CREAT SERPL: 15 (ref 9–23)
CALCIUM SERPL-MCNC: 9.5 MG/DL (ref 8.7–10.2)
CALCIUM SERPL-MCNC: 9.7 MG/DL (ref 8.7–10.2)
CANCER AG125 SERPL-ACNC: 331.2 U/ML (ref 0–38.1)
CHLORIDE SERPL-SCNC: 107 MMOL/L (ref 96–106)
CHLORIDE SERPL-SCNC: 108 MMOL/L (ref 96–106)
CHOLEST SERPL-MCNC: 247 MG/DL (ref 100–199)
CO2 SERPL-SCNC: 20 MMOL/L (ref 20–29)
CO2 SERPL-SCNC: 20 MMOL/L (ref 20–29)
CREAT SERPL-MCNC: 1.46 MG/DL (ref 0.57–1)
CREAT SERPL-MCNC: 1.52 MG/DL (ref 0.57–1)
CREAT UR-MCNC: 102.8 MG/DL
EOSINOPHIL # BLD AUTO: 0.5 X10E3/UL (ref 0–0.4)
EOSINOPHIL # BLD AUTO: 0.5 X10E3/UL (ref 0–0.4)
EOSINOPHIL NFR BLD AUTO: 6 %
EOSINOPHIL NFR BLD AUTO: 6 %
ERYTHROCYTE [DISTWIDTH] IN BLOOD BY AUTOMATED COUNT: 15.6 % (ref 12.3–15.4)
ERYTHROCYTE [DISTWIDTH] IN BLOOD BY AUTOMATED COUNT: 15.7 % (ref 12.3–15.4)
EST. AVERAGE GLUCOSE BLD GHB EST-MCNC: 120 MG/DL
GLOBULIN SER CALC-MCNC: 3.8 G/DL (ref 1.5–4.5)
GLOBULIN SER CALC-MCNC: 4.1 G/DL (ref 1.5–4.5)
GLUCOSE SERPL-MCNC: 127 MG/DL (ref 65–99)
GLUCOSE SERPL-MCNC: 128 MG/DL (ref 65–99)
HBA1C MFR BLD: 5.8 % (ref 4.8–5.6)
HCT VFR BLD AUTO: 27.6 % (ref 34–46.6)
HCT VFR BLD AUTO: 27.7 % (ref 34–46.6)
HDLC SERPL-MCNC: 36 MG/DL
HGB BLD-MCNC: 8.8 G/DL (ref 11.1–15.9)
HGB BLD-MCNC: 8.8 G/DL (ref 11.1–15.9)
IMM GRANULOCYTES # BLD AUTO: 0 X10E3/UL (ref 0–0.1)
IMM GRANULOCYTES # BLD AUTO: 0 X10E3/UL (ref 0–0.1)
IMM GRANULOCYTES NFR BLD AUTO: 0 %
IMM GRANULOCYTES NFR BLD AUTO: 0 %
LDLC SERPL CALC-MCNC: 177 MG/DL (ref 0–99)
LYMPHOCYTES # BLD AUTO: 1.4 X10E3/UL (ref 0.7–3.1)
LYMPHOCYTES # BLD AUTO: 1.5 X10E3/UL (ref 0.7–3.1)
LYMPHOCYTES NFR BLD AUTO: 18 %
LYMPHOCYTES NFR BLD AUTO: 18 %
MAGNESIUM SERPL-MCNC: 1.5 MG/DL (ref 1.6–2.3)
MCH RBC QN AUTO: 28.3 PG (ref 26.6–33)
MCH RBC QN AUTO: 28.4 PG (ref 26.6–33)
MCHC RBC AUTO-ENTMCNC: 31.8 G/DL (ref 31.5–35.7)
MCHC RBC AUTO-ENTMCNC: 31.9 G/DL (ref 31.5–35.7)
MCV RBC AUTO: 89 FL (ref 79–97)
MCV RBC AUTO: 89 FL (ref 79–97)
MICROALBUMIN UR-MCNC: 1756.7 UG/ML
MONOCYTES # BLD AUTO: 0.7 X10E3/UL (ref 0.1–0.9)
MONOCYTES # BLD AUTO: 0.8 X10E3/UL (ref 0.1–0.9)
MONOCYTES NFR BLD AUTO: 10 %
MONOCYTES NFR BLD AUTO: 9 %
NEUTROPHILS # BLD AUTO: 5.3 X10E3/UL (ref 1.4–7)
NEUTROPHILS # BLD AUTO: 5.6 X10E3/UL (ref 1.4–7)
NEUTROPHILS NFR BLD AUTO: 66 %
NEUTROPHILS NFR BLD AUTO: 67 %
PLATELET # BLD AUTO: 202 X10E3/UL (ref 150–379)
PLATELET # BLD AUTO: 207 X10E3/UL (ref 150–379)
POTASSIUM SERPL-SCNC: 5 MMOL/L (ref 3.5–5.2)
POTASSIUM SERPL-SCNC: 5.2 MMOL/L (ref 3.5–5.2)
PROT SERPL-MCNC: 8 G/DL (ref 6–8.5)
PROT SERPL-MCNC: 8.3 G/DL (ref 6–8.5)
RBC # BLD AUTO: 3.1 X10E6/UL (ref 3.77–5.28)
RBC # BLD AUTO: 3.11 X10E6/UL (ref 3.77–5.28)
SODIUM SERPL-SCNC: 140 MMOL/L (ref 134–144)
SODIUM SERPL-SCNC: 142 MMOL/L (ref 134–144)
T4 FREE SERPL-MCNC: 1.25 NG/DL (ref 0.82–1.77)
TRIGL SERPL-MCNC: 168 MG/DL (ref 0–149)
TSH SERPL DL<=0.005 MIU/L-ACNC: 1.09 UIU/ML (ref 0.45–4.5)
VLDLC SERPL CALC-MCNC: 34 MG/DL (ref 5–40)
WBC # BLD AUTO: 8.1 X10E3/UL (ref 3.4–10.8)
WBC # BLD AUTO: 8.4 X10E3/UL (ref 3.4–10.8)

## 2019-03-29 NOTE — TELEPHONE ENCOUNTER
Lab results from 3/28/19 (CBC, CMP) results given to pt. She will have labs repeated at lab alize 4/4/19.

## 2019-04-02 ENCOUNTER — TELEPHONE (OUTPATIENT)
Dept: GYNECOLOGY | Age: 59
End: 2019-04-02

## 2019-04-02 DIAGNOSIS — T45.1X5A CINV (CHEMOTHERAPY-INDUCED NAUSEA AND VOMITING): ICD-10-CM

## 2019-04-02 DIAGNOSIS — R11.2 CINV (CHEMOTHERAPY-INDUCED NAUSEA AND VOMITING): ICD-10-CM

## 2019-04-02 DIAGNOSIS — E03.9 ACQUIRED HYPOTHYROIDISM: ICD-10-CM

## 2019-04-02 RX ORDER — ONDANSETRON 4 MG/1
4 TABLET, ORALLY DISINTEGRATING ORAL
Qty: 30 TAB | Refills: 2 | Status: SHIPPED | OUTPATIENT
Start: 2019-04-02 | End: 2020-01-01 | Stop reason: SDUPTHER

## 2019-04-02 RX ORDER — LEVOTHYROXINE SODIUM 150 UG/1
TABLET ORAL
Qty: 90 TAB | Refills: 0 | Status: SHIPPED | OUTPATIENT
Start: 2019-04-02

## 2019-04-02 NOTE — TELEPHONE ENCOUNTER
Requested Prescriptions     Signed Prescriptions Disp Refills    ondansetron (ZOFRAN ODT) 4 mg disintegrating tablet 30 Tab 2     Sig: Take 1 Tab by mouth every eight (8) hours as needed for Nausea. Indications: Nausea and Vomiting caused by Cancer Drugs     Authorizing Provider: Blanka Blackwood     Ordering User: Nhi Thao     Rx sent to pharmacy per pt request and vo Dr. Chuck Dee.

## 2019-04-05 ENCOUNTER — TELEPHONE (OUTPATIENT)
Dept: GYNECOLOGY | Age: 59
End: 2019-04-05

## 2019-04-05 LAB
ALBUMIN SERPL-MCNC: 3.8 G/DL (ref 3.5–5.5)
ALBUMIN/GLOB SERPL: 0.9 {RATIO} (ref 1.2–2.2)
ALP SERPL-CCNC: 93 IU/L (ref 39–117)
ALT SERPL-CCNC: 6 IU/L (ref 0–32)
AST SERPL-CCNC: 22 IU/L (ref 0–40)
BASOPHILS # BLD AUTO: 0 X10E3/UL (ref 0–0.2)
BASOPHILS NFR BLD AUTO: 0 %
BILIRUB SERPL-MCNC: 0.4 MG/DL (ref 0–1.2)
BUN SERPL-MCNC: 29 MG/DL (ref 6–24)
BUN/CREAT SERPL: 15 (ref 9–23)
CALCIUM SERPL-MCNC: 9.3 MG/DL (ref 8.7–10.2)
CHLORIDE SERPL-SCNC: 106 MMOL/L (ref 96–106)
CO2 SERPL-SCNC: 19 MMOL/L (ref 20–29)
CREAT SERPL-MCNC: 1.91 MG/DL (ref 0.57–1)
EOSINOPHIL # BLD AUTO: 0.5 X10E3/UL (ref 0–0.4)
EOSINOPHIL NFR BLD AUTO: 6 %
ERYTHROCYTE [DISTWIDTH] IN BLOOD BY AUTOMATED COUNT: 14.6 % (ref 12.3–15.4)
GLOBULIN SER CALC-MCNC: 4.2 G/DL (ref 1.5–4.5)
GLUCOSE SERPL-MCNC: 140 MG/DL (ref 65–99)
HCT VFR BLD AUTO: 27.2 % (ref 34–46.6)
HGB BLD-MCNC: 8.7 G/DL (ref 11.1–15.9)
IMM GRANULOCYTES # BLD AUTO: 0 X10E3/UL (ref 0–0.1)
IMM GRANULOCYTES NFR BLD AUTO: 0 %
LYMPHOCYTES # BLD AUTO: 1.5 X10E3/UL (ref 0.7–3.1)
LYMPHOCYTES NFR BLD AUTO: 18 %
MAGNESIUM SERPL-MCNC: 1.8 MG/DL (ref 1.6–2.3)
MCH RBC QN AUTO: 28.6 PG (ref 26.6–33)
MCHC RBC AUTO-ENTMCNC: 32 G/DL (ref 31.5–35.7)
MCV RBC AUTO: 90 FL (ref 79–97)
MONOCYTES # BLD AUTO: 0.5 X10E3/UL (ref 0.1–0.9)
MONOCYTES NFR BLD AUTO: 6 %
NEUTROPHILS # BLD AUTO: 5.6 X10E3/UL (ref 1.4–7)
NEUTROPHILS NFR BLD AUTO: 70 %
PLATELET # BLD AUTO: 211 X10E3/UL (ref 150–379)
POTASSIUM SERPL-SCNC: 5.2 MMOL/L (ref 3.5–5.2)
PROT SERPL-MCNC: 8 G/DL (ref 6–8.5)
RBC # BLD AUTO: 3.04 X10E6/UL (ref 3.77–5.28)
SODIUM SERPL-SCNC: 140 MMOL/L (ref 134–144)
WBC # BLD AUTO: 8.1 X10E3/UL (ref 3.4–10.8)

## 2019-04-05 NOTE — TELEPHONE ENCOUNTER
Pt informed to stop Jocelyn Parents today and hold for 1 week and have labs repeated on 4/11/19, per Dr. Filiberto Lyn.

## 2019-04-05 NOTE — PROGRESS NOTES
Mj Cabrera,     I don't think we can continue ms. Tamez on her PARPi. Her creatinine is not tolerating it. Can we hold a week and then recheck labs and dose reduce. Thanks,    ADA Ham

## 2019-04-05 NOTE — TELEPHONE ENCOUNTER
----- Message from Carmelo Lorenzo MD sent at 4/5/2019  9:54 AM EDT -----  Carmen Carballo,     I don't think we can continue ms. Tamez on her PARPi. Her creatinine is not tolerating it. Can we hold a week and then recheck labs and dose reduce. Thanks,    ADA Lyn

## 2019-04-08 ENCOUNTER — TELEPHONE (OUTPATIENT)
Dept: INTERNAL MEDICINE CLINIC | Age: 59
End: 2019-04-08
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
## 2019-04-10 NOTE — TELEPHONE ENCOUNTER
Identified patient 2 identifiers verified.   Patient accepted an appointment with Dr. Binta Mims on 4/16/19 at 9:15 am

## 2019-04-11 ENCOUNTER — HOSPITAL ENCOUNTER (OUTPATIENT)
Dept: INFUSION THERAPY | Age: 59
Discharge: HOME | End: 2019-04-11
Payer: MEDICARE

## 2019-04-11 VITALS
TEMPERATURE: 97.4 F | RESPIRATION RATE: 18 BRPM | HEART RATE: 89 BPM | DIASTOLIC BLOOD PRESSURE: 74 MMHG | SYSTOLIC BLOOD PRESSURE: 132 MMHG

## 2019-04-11 LAB
ALBUMIN SERPL-MCNC: 3.4 G/DL (ref 3.5–5)
ALBUMIN/GLOB SERPL: 0.7 {RATIO} (ref 1.1–2.2)
ALP SERPL-CCNC: 101 U/L (ref 45–117)
ALT SERPL-CCNC: 15 U/L (ref 12–78)
ANION GAP SERPL CALC-SCNC: 9 MMOL/L (ref 5–15)
AST SERPL-CCNC: 31 U/L (ref 15–37)
BASOPHILS # BLD: 0.1 K/UL (ref 0–0.1)
BASOPHILS NFR BLD: 1 % (ref 0–1)
BILIRUB SERPL-MCNC: 0.5 MG/DL (ref 0.2–1)
BUN SERPL-MCNC: 41 MG/DL (ref 6–20)
BUN/CREAT SERPL: 23 (ref 12–20)
CALCIUM SERPL-MCNC: 9.5 MG/DL (ref 8.5–10.1)
CANCER AG125 SERPL-ACNC: 228 U/ML (ref 1.5–35)
CHLORIDE SERPL-SCNC: 110 MMOL/L (ref 97–108)
CO2 SERPL-SCNC: 20 MMOL/L (ref 21–32)
CREAT SERPL-MCNC: 1.82 MG/DL (ref 0.55–1.02)
DIFFERENTIAL METHOD BLD: ABNORMAL
EOSINOPHIL # BLD: 0.5 K/UL (ref 0–0.4)
EOSINOPHIL NFR BLD: 5 % (ref 0–7)
ERYTHROCYTE [DISTWIDTH] IN BLOOD BY AUTOMATED COUNT: 13.8 % (ref 11.5–14.5)
GLOBULIN SER CALC-MCNC: 4.9 G/DL (ref 2–4)
GLUCOSE SERPL-MCNC: 142 MG/DL (ref 65–100)
HCT VFR BLD AUTO: 26.4 % (ref 35–47)
HGB BLD-MCNC: 7.8 G/DL (ref 11.5–16)
IMM GRANULOCYTES # BLD AUTO: 0 K/UL (ref 0–0.04)
IMM GRANULOCYTES NFR BLD AUTO: 0 % (ref 0–0.5)
LYMPHOCYTES # BLD: 2.2 K/UL (ref 0.8–3.5)
LYMPHOCYTES NFR BLD: 25 % (ref 12–49)
MAGNESIUM SERPL-MCNC: 2.1 MG/DL (ref 1.6–2.4)
MCH RBC QN AUTO: 28.7 PG (ref 26–34)
MCHC RBC AUTO-ENTMCNC: 29.5 G/DL (ref 30–36.5)
MCV RBC AUTO: 97.1 FL (ref 80–99)
MONOCYTES # BLD: 0.6 K/UL (ref 0–1)
MONOCYTES NFR BLD: 7 % (ref 5–13)
NEUTS SEG # BLD: 5.5 K/UL (ref 1.8–8)
NEUTS SEG NFR BLD: 62 % (ref 32–75)
NRBC # BLD: 0 K/UL (ref 0–0.01)
NRBC BLD-RTO: 0 PER 100 WBC
PLATELET # BLD AUTO: 166 K/UL (ref 150–400)
PMV BLD AUTO: 9.5 FL (ref 8.9–12.9)
POTASSIUM SERPL-SCNC: 4.8 MMOL/L (ref 3.5–5.1)
PROT SERPL-MCNC: 8.3 G/DL (ref 6.4–8.2)
RBC # BLD AUTO: 2.72 M/UL (ref 3.8–5.2)
SODIUM SERPL-SCNC: 139 MMOL/L (ref 136–145)
WBC # BLD AUTO: 8.8 K/UL (ref 3.6–11)

## 2019-04-11 PROCEDURE — 83735 ASSAY OF MAGNESIUM: CPT

## 2019-04-11 PROCEDURE — 77030012965 HC NDL HUBR BBMI -A

## 2019-04-11 PROCEDURE — 86304 IMMUNOASSAY TUMOR CA 125: CPT

## 2019-04-11 PROCEDURE — 36591 DRAW BLOOD OFF VENOUS DEVICE: CPT

## 2019-04-11 PROCEDURE — 85025 COMPLETE CBC W/AUTO DIFF WBC: CPT

## 2019-04-11 PROCEDURE — 36415 COLL VENOUS BLD VENIPUNCTURE: CPT

## 2019-04-11 PROCEDURE — 80053 COMPREHEN METABOLIC PANEL: CPT

## 2019-04-11 NOTE — PROGRESS NOTES
Outpatient Infusion Center Short Visit Progress Note 
 
6910 Pt admit to [facility] for port flush/labs ambulatory in stable condition. Assessment completed. Pt reports chronic R knee pain. No new concerns voiced. Please review pending lab results in [address] [place] [address]. Patient Vitals for the past 12 hrs: 
 Temp Pulse Resp BP  
04/11/19 0950 97.4 °F (36.3 °C) 89 18 132/74 Port accessed with positive blood return. Labs drawn, flushed, heparinized and de-accessed per protocol. Medications: 
NS flush Heparin flush 
 
1000 Pt tolerated treatment well. D/c home ambulatory in no distress. Pt aware of next appointment scheduled for 05/16/2019.

## 2019-04-11 NOTE — PROGRESS NOTES
Haider Gutiérrez, Let's have her check her labs again next week. Still not where I would like her creatinine. Continue to hold medication.  
 
Thanks, 
 
Jono Garcia MD

## 2019-04-15 ENCOUNTER — TELEPHONE (OUTPATIENT)
Dept: GYNECOLOGY | Age: 59
End: 2019-04-15

## 2019-04-15 RX ORDER — LINACLOTIDE 145 UG/1
CAPSULE, GELATIN COATED ORAL
Qty: 30 CAP | Refills: 0 | Status: SHIPPED | OUTPATIENT
Start: 2019-04-15 | End: 2019-04-18 | Stop reason: SDUPTHER

## 2019-04-15 NOTE — TELEPHONE ENCOUNTER
----- Message from Humble Martin MD sent at 4/11/2019  2:45 PM EDT -----  Seema Crystaltana,     Let's have her check her labs again next week. Still not where I would like her creatinine. Continue to hold medication.     Thanks,    Humble Martin MD

## 2019-04-15 NOTE — TELEPHONE ENCOUNTER
Pt informed to continue holding Ja Asencio for another week and repeat labs on 4/18/19. She has standing lab orders at [facility] on [address]. And will go there.

## 2019-04-16 ENCOUNTER — OFFICE VISIT (OUTPATIENT)
Dept: INTERNAL MEDICINE CLINIC | Age: 59
End: 2019-04-16

## 2019-04-16 VITALS
DIASTOLIC BLOOD PRESSURE: 71 MMHG | HEIGHT: 69 IN | OXYGEN SATURATION: 95 % | SYSTOLIC BLOOD PRESSURE: 109 MMHG | BODY MASS INDEX: 43.4 KG/M2 | WEIGHT: 293 LBS | RESPIRATION RATE: 16 BRPM | HEART RATE: 89 BPM | TEMPERATURE: 97.9 F

## 2019-04-16 DIAGNOSIS — C56.9 MALIGNANT NEOPLASM OF OVARY, UNSPECIFIED LATERALITY (HCC): Primary | ICD-10-CM

## 2019-04-16 DIAGNOSIS — N13.1 HYDRONEPHROSIS WITH URETERAL STRICTURE, NOT ELSEWHERE CLASSIFIED: ICD-10-CM

## 2019-04-16 NOTE — PROGRESS NOTES
SUBJECTIVE  Ms. Kait Brennan presents today acutely for     Chief Complaint   Patient presents with   [facility] Follow Up     pt here today for hosp f/u from [facility] - stent    Knee Pain     pt c.o pain in R knee - pt states she last saw Dr Ellen Valdez before he retire    Results     pt wants to discuss lab results      On March 18, she had cystoscopy and stent placement by Dr. Charisse Davis. Doing well, urinating with no problem. She sees Gyn-Onc, and was put on Zejula, but unable to tolerate. History of ovarian cancer, s/p MARAH and chemotherapy. Dr. Myraim Burks (prev Payton Rao). Right now, feels okay. \"My main problem is my knee, it hurts and gives out on me. \"  R knee. She had been seeing Dr. Ellen Valdez, who has retired. Past Medical History:   Diagnosis Date    Adverse effect of anesthesia     sleep apnea cpap machine useage     Anemia     Arthritis     DDD low back and knees    Asthma     uses albuterol prn only; none x 6 mos.   Never hospitalized    BRCA negative 1/2013    Calculus of kidney     Chronic kidney disease     kidney stones    Chronic pain     knee pain bilat    CINV (chemotherapy-induced nausea and vomiting) 8/21/2014    Decreased hearing, right     PATIENT STATES THIS IS DUE TO CHEMOTHERAPY    Diabetes ([address] Age 46    IDDM    Environmental allergies     Fibromyalgia     GERD (gastroesophageal reflux disease)     controlled with med    Hx of pulmonary embolus during pregnancy 1/18/2015    Hydronephrosis due to obstruction of ureter 3/18/2019    Hypertension     Ill-defined condition     Sepsis 9-2015 -  SOURCE PORT    Ill-defined condition 2016    chemotherapy     Mass of chest wall 05/2018    Morbid obesity (HCC)     Murmur, heart     Other and unspecified hyperlipidemia     Ovarian cancer ([address] 9/2012, 1/2014    serous, high grade, stage IIIc. s/p chemotx (has port)    Psychiatric disorder     Depression/Anxiety    Rectal bleeding     Thromboembolus ([address] 1993 POST PARTUM; resolved- PELVIS    Thyroid disease     HYPOTHYROID    Unspecified sleep apnea     CPAP, Dr. Nat De Guzman     Current Outpatient Medications on File Prior to Visit   Medication Sig Dispense Refill    LINZESS 145 mcg cap capsule TAKE 1 CAPSULE BY MOUTH EVERY DAYY 30 Cap 0    ondansetron (ZOFRAN ODT) 4 mg disintegrating tablet Take 1 Tab by mouth every eight (8) hours as needed for Nausea. Indications: Nausea and Vomiting caused by Cancer Drugs 30 Tab 2    levothyroxine (SYNTHROID) 150 mcg tablet TAKE 1 TABLET BY MOUTH EVERY DAY BEFORE BREAKFAST 90 Tab 0    EPINEPHrine (EPIPEN) 0.3 mg/0.3 mL injection INJECT 0.3 ML BY INTRAMUSCULAR ROUTE ONCE AS NEEDED FOR UP TO 1 DOSE.  1    ZEJULA 100 mg cap Take 100 mg by mouth two (2) times a day.  fluticasone propionate (FLONASE) 50 mcg/actuation nasal spray       amoxicillin (AMOXIL) 500 mg capsule Prior to dental procedures      oxyCODONE IR (ROXICODONE) 5 mg immediate release tablet Take 1 Tab by mouth every four (4) hours as needed for Pain. Max Daily Amount: 30 mg. 45 Tab 0    glucose blood VI test strips (TRUE METRIX GLUCOSE TEST STRIP) strip by Does Not Apply route See Admin Instructions. 30 Strip 5    lancets misc by Does Not Apply route daily. True Metrix lancets- test blood sugar once per day 30 Each 5    Blood-Glucose Meter monitoring kit Check up to tid, as directed 1 Kit 0    azelastine (ASTELIN) 137 mcg (0.1 %) nasal spray 1 Brilliant by Both Nostrils route daily as needed. Use in each nostril as directed       insulin detemir U-100 (LEVEMIR) 100 unit/mL injection 20 Units by SubCUTAneous route daily (with lunch).  montelukast (SINGULAIR) 10 mg tablet Take 10 mg by mouth nightly.  ascorbic acid, vitamin C, (VITAMIN C) 1,000 mg tablet Take 1,000 mg by mouth daily.  albuterol (PROVENTIL VENTOLIN) 2.5 mg /3 mL (0.083 %) nebulizer solution 2.5 mg by Nebulization route every four (4) hours as needed for Wheezing.       potassium chloride (KLOR-CON M10) 10 mEq tablet TAKE 1 TAB BY MOUTH TWO (2) DAYS A WEEK. WED AND FRI 90 Tab 1    telmisartan (MICARDIS) 20 mg tablet Take 1 Tab by mouth daily. 90 Tab 3    insulin aspart U-100 (NOVOLOG FLEXPEN U-100 INSULIN) 100 unit/mL inpn INJECT 20 UNITS BEFORE EACH MEAL + 4 UNITS FOR EVERY 50 MG/DL ABOVE 150 MG/DL.  UNITS PER DAY 30 mL 11    omega-3 acid ethyl esters (LOVAZA) 1 gram capsule TAKE ONE CAPSULE BY MOUTH TWICE A  Cap 2    Dexlansoprazole (DEXILANT) 60 mg CpDB Take 1 Cap by mouth daily. 90 Cap 3    Liraglutide (VICTOZA) 0.6 mg/0.1 mL (18 mg/3 mL) pnij 1.8 mg by SubCUTAneous route daily (with lunch). 3 Pen 3    SYMBICORT 160-4.5 mcg/actuation HFAA Take 2 Puffs by inhalation two (2) times daily as needed. 2 Inhaler 3    clonazePAM (KLONOPIN) 0.5 mg tablet Take 0.5 mg by mouth nightly as needed.  sertraline (ZOLOFT) 50 mg tablet TAKE 1 TABLET BY MOUTH as needed  1    cholecalciferol, VITAMIN D3, (VITAMIN D3) 5,000 unit tab tablet Take 5,000 Units by mouth daily.  furosemide (LASIX) 40 mg tablet Take 40 mg by mouth every other day. 3     No current facility-administered medications on file prior to visit. SH:  reports that she has never smoked. She has never used smokeless tobacco. She reports that she drinks alcohol. She reports that she does not use drugs. ROS: Complete review of systems was performed and is otherwise unremarkable except as noted elsewhere. OBJECTIVE  Visit Vitals  /71 (BP 1 Location: Left arm, BP Patient Position: Sitting)   Pulse 89   Temp 97.9 °F (36.6 °C) (Oral)   Resp 16   Ht 5' 9\" (1.753 m)   Wt 296 lb 12.8 oz (134.6 kg)   LMP 09/07/2011   SpO2 95%   BMI 43.83 kg/m²     Gen: Pleasant 63 y.o.  female in NAD.   HEENT: PERRLA. EOMI. OP moist and pink.  Neck: Supple.  No LAD.  HEART: RRR, No M/G/R.   LUNGS: CTAB No W/R.   ABDOMEN: S, NT, ND, BS+.   EXTREMITIES: Warm. No C/C/E. MUSCULOSKELETAL: Crepitus R knee.  NEURO: Alert and oriented x 3.  Cranial nerves grossly intact.  No focal sensory or motor deficits noted. SKIN: Warm. Dry. No rashes or other lesions noted. Lab Results   Component Value Date/Time    WBC 8.8 04/11/2019 09:53 AM    Hemoglobin (POC) 10.5 (L) 03/20/2017 12:15 PM    HGB 7.8 (L) 04/11/2019 09:53 AM    Hematocrit (POC) 31 (L) 03/20/2017 12:15 PM    HCT 26.4 (L) 04/11/2019 09:53 AM    PLATELET 349 53/91/5499 09:53 AM    MCV 97.1 04/11/2019 09:53 AM     Lab Results   Component Value Date/Time    Sodium 139 04/11/2019 09:53 AM    Potassium 4.8 04/11/2019 09:53 AM    Chloride 110 (H) 04/11/2019 09:53 AM    CO2 20 (L) 04/11/2019 09:53 AM    Anion gap 9 04/11/2019 09:53 AM    Glucose 142 (H) 04/11/2019 09:53 AM    BUN 41 (H) 04/11/2019 09:53 AM    Creatinine 1.82 (H) 04/11/2019 09:53 AM    BUN/Creatinine ratio 23 (H) 04/11/2019 09:53 AM    GFR est AA 35 (L) 04/11/2019 09:53 AM    GFR est non-AA 29 (L) 04/11/2019 09:53 AM    Calcium 9.5 04/11/2019 09:53 AM    Bilirubin, total 0.5 04/11/2019 09:53 AM    AST (SGOT) 31 04/11/2019 09:53 AM    Alk. phosphatase 101 04/11/2019 09:53 AM    Protein, total 8.3 (H) 04/11/2019 09:53 AM    Albumin 3.4 (L) 04/11/2019 09:53 AM    Globulin 4.9 (H) 04/11/2019 09:53 AM    A-G Ratio 0.7 (L) 04/11/2019 09:53 AM    ALT (SGPT) 15 04/11/2019 09:53 AM         ASSESSMENT / PLAN  1. Ureteral obstruction / hydronephrosis: s/p cystoscopy and stent placement. Follow up planned with Dr. Lizet Al, urology. Labs have been rechecked recently. 2. Ovarian cancer: Per Gyn-Onc. Off med right now (unable to tolerate the Zejula). 3. R knee pain: Might need to get back in with orthopedics. Work on weight loss. 4. Obesity, morbid    I have reviewed with the patient details of the assessment and plan and all questions were answered. Relevant patient education was performed.

## 2019-04-16 NOTE — PROGRESS NOTES
1. Have you been to the ER, urgent care clinic since your last visit? Hospitalized since your last visit? [facility] ER    2. Have you seen or consulted any other health care providers outside of the [address] [place] [address] since your last visit? Include any pap smears or colon screening.  Eye doctor due to issue with eye after surg

## 2019-04-17 NOTE — IP AVS SNAPSHOT
1111 [facility] [address] [place] [address] 
[phone] Patient: Glynn Orellana MRN: [id number] :1960 About your hospitalization You were admitted on: May 4, 2018 [facility] You were discharged on: May 4, 2018 Why you were hospitalized Your primary diagnosis was:  Not on File Follow-up Information Follow up With Details Comments Contact Info Candis Patrick MD   [address] [place] [address] IV [address] [employer] 
[phone] Leonides Sierra MD Go in 3 week(s) Keep chemotherapy appointment See Dr. Wayne Loo in three weeks, call for appointment 200 [facility] Professor Ramirez 108 [address] [place] [address] 
[phone] Your Scheduled Appointments Tuesday May 08, 2018  2:00 PM EDT INFUSION 60 with MELONYMO FT CHAIR 1  
[address] ([address]) [address] [facility] [address] [phone]  
[phone] Go to [address], ground floor. Tuesday May 15, 2018 10:00 AM EDT INFUSION 60 with MELONYMO INFUSION NURSE 5  
[address] ([address]) [address] [facility] [address] [phone]  
[phone] Go to [address], ground floor. Tuesday May 22, 2018 10:00 AM EDT INFUSION 60 with  102A- CHAIR [facility]  
[address] ([address]) [address] [facility] [address] [phone]  
[phone] Go to [address], ground floor. Thursday May 31, 2018 10:00 AM EDT CHEMOTHERAPY with MALLIKA Luke Chi [address] [facility] ([facility]) [address] [facility] [address] [phone]  
[phone]  2018 10:00 AM EDT  
 Pre-Visit Chart Review  For Appointment Scheduled on 04/22/2019    There are no preventive care reminders to display for this patient.                   INFUSION 60 with RM 102D- CHAIR [facility] - [address] ([address]) [address] [address]  
[phone] Go to [address], ground floor. Tuesday June 12, 2018 10:00 AM EDT INFUSION 60 with RM 102D- CHAIR [facility] - [address] ([address]) [address] [phone]  
[phone] Go to [address], ground floor. Wednesday June 13, 2018  9:15 AM EDT ROUTINE CARE with Stephania Chapa MD  
[facility] [address] [facility] [address] [place] [address]  
[phone] Tuesday June 19, 2018 10:00 AM EDT INFUSION 60 with RM 102D- CHAIR [facility] - [address] ([address]) [address] [address]  
[phone] Go to [address], ground floor. Thursday June 28, 2018 10:00 AM EDT CHEMOTHERAPY with MALLIKA Senior [address] [place] [address] ([facility] 200 [facility] [address] [phone]  
[phone] Discharge Orders None A check chikis indicates which time of day the medication should be taken. My Medications CHANGE how you take these medications Instructions Each Dose to Equal  
 Morning Noon Evening Bedtime * HYDROcodone-acetaminophen 5-325 mg per tablet Commonly known as:  Marsha Jara What changed:  Another medication with the same name was added. Make sure you understand how and when to take each. Your last dose was: Your next dose is: Take 1 Tab by mouth every four (4) hours as needed for Pain. Max Daily Amount: 6 Tabs. 1 Tab  
    
   
   
   
  
 * HYDROcodone-acetaminophen 5-325 mg per tablet Commonly known as:  Marsha Jara  
 What changed: You were already taking a medication with the same name, and this prescription was added. Make sure you understand how and when to take each. Your last dose was: Your next dose is: Take 1 Tab by mouth every four (4) hours as needed for Pain. Max Daily Amount: 6 Tabs. Indications: Pain 1 Tab * Notice: This list has 2 medication(s) that are the same as other medications prescribed for you. Read the directions carefully, and ask your doctor or other care provider to review them with you. CONTINUE taking these medications Instructions Each Dose to Equal  
 Morning Noon Evening Bedtime * albuterol 90 mcg/actuation inhaler Commonly known as:  PROVENTIL HFA, VENTOLIN HFA, PROAIR HFA Your last dose was: Your next dose is: Take 2 Puffs by inhalation every four (4) hours as needed for Wheezing. 2 Puff * albuterol 2.5 mg /3 mL (0.083 %) nebulizer solution Commonly known as:  PROVENTIL VENTOLIN Your last dose was: Your next dose is:    
   
   
 USE 1 VAIL VIA NEBULIZER EVERY 4 HOURS AS NEEDED FOR WHEEZING  
     
   
   
   
  
 ALLEGRA 180 mg tablet Generic drug:  fexofenadine Your last dose was: Your next dose is: Take 180 mg by mouth daily. 180 mg  
    
   
   
   
  
 aspirin delayed-release 81 mg tablet Your last dose was: Your next dose is: Take  by mouth daily. BD ULTRA-FINE SHORT PEN NEEDLE 31 gauge x 5/16\" Ndle Generic drug:  Insulin Needles (Disposable) Your last dose was: Your next dose is:    
   
   
 USE WITH INSULIN TO INJECT FIVE TIMES DAILY. cholecalciferol (VITAMIN D3) 5,000 unit Tab tablet Commonly known as:  VITAMIN D3 Your last dose was: Your next dose is: Take 5,000 Units by mouth daily. 5000 Units clonazePAM 0.5 mg tablet Commonly known as:  Cyrilla Mayans Your last dose was: Your next dose is: Take 0.5 mg by mouth nightly as needed. 0.5 mg Dexlansoprazole 60 mg Cpdb Commonly known as:  DEXILANT Your last dose was: Your next dose is: Take 1 Cap by mouth daily. 60 mg  
    
   
   
   
  
 diphenhydrAMINE 25 mg capsule Commonly known as:  BENADRYL Your last dose was: Your next dose is: Take 25 mg by mouth as needed. 25 mg  
    
   
   
   
  
 fluticasone 50 mcg/actuation nasal spray Commonly known as:  Tresa Hoot Your last dose was: Your next dose is: 2 Sprays by Both Nostrils route daily. 2 Spray  
    
   
   
   
  
 furosemide 40 mg tablet Commonly known as:  LASIX Your last dose was: Your next dose is: Take 80 mg by mouth as needed. 80 mg  
    
   
   
   
  
 insulin aspart U-100 100 unit/mL Inpn Commonly known as:  NovoLOG Flexpen U-100 Insulin Your last dose was: Your next dose is: INJECT 20 UNITS BEFORE EACH MEAL + 4 UNITS FOR EVERY 50 MG/DL ABOVE 150 MG/DL.  UNITS PER DAY  
     
   
   
   
  
 insulin detemir U-100 100 unit/mL (3 mL) Inpn Commonly known as:  LEVEMIR FLEXTOUCH U-100 INSULN Your last dose was: Your next dose is: INJECT 30 UNITS IN AM AND 50 UNITS AT NIGHT. INCREASE AS DIRECTED TO  UNITS PER DAY--CALL [phone] FOR APPOINTMENT FOR MORE REFILLS  
     
   
   
   
  
 levothyroxine 150 mcg tablet Commonly known as:  SYNTHROID Your last dose was: Your next dose is: TAKE 1 TAB BY MOUTH DAILY (BEFORE BREAKFAST). --[facility] [phone] FOR APPOINTMENT FOR MORE REFILLS  
     
   
   
   
  
 lidocaine-prilocaine topical cream  
Commonly known as:  EMLA Your last dose was: Your next dose is:    
   
   
 Apply small amount over port area one hour before chemo treatment and cover with a Band-Aid LINZESS 145 mcg Cap capsule Generic drug:  linaclotide Your last dose was: Your next dose is: TAKE ONE CAPSULE DAILY Liraglutide 0.6 mg/0.1 mL (18 mg/3 mL) Pnij Commonly known as:  Kaleigh Space Your last dose was: Your next dose is:    
   
   
 1.8 mg by SubCUTAneous route daily (with lunch). 1.8 mg  
    
   
   
   
  
 LUBRICANT EYE 57.3-42.5 % Oint ophthalmic ointment Generic drug:  White Petrolatum-Mineral Oil Your last dose was: Your next dose is:    
   
   
 APPLY TOPICALLY INTO BOTH EYES EVERY DAY FOR 3 MONTHS  
     
   
   
   
  
 montelukast 10 mg tablet Commonly known as:  SINGULAIR Your last dose was: Your next dose is: TAKE 1 TAB BY MOUTH DAILY. naloxone 2 mg/0.4 mL auto-injector Commonly known as:  Anthony Ham Your last dose was: Your next dose is: 0.4 mL by IntraMUSCular route once as needed for Overdose for up to 1 dose. 2 mg  
    
   
   
   
  
 nystatin powder Commonly known as:  MYCOSTATIN Your last dose was: Your next dose is:    
   
   
 Apply  to affected area four (4) times daily as needed. omega-3 acid ethyl esters 1 gram capsule Commonly known as:  Zebedee Smoker Your last dose was: Your next dose is: TAKE ONE CAPSULE BY MOUTH TWICE A DAY  
     
   
   
   
  
 ondansetron 8 mg disintegrating tablet Commonly known as:  ZOFRAN ODT Your last dose was: Your next dose is: Take 1 Tab by mouth every eight (8) hours as needed for Nausea. 8 mg  
    
   
   
   
  
 potassium chloride 10 mEq tablet Commonly known as:  KLOR-CON M10 Your last dose was: Your next dose is: TAKE 1 TAB BY MOUTH TWO (2) DAYS A WEEK. WED AND FRI  
     
   
   
   
  
 RESTASIS 0.05 % ophthalmic emulsion Generic drug:  cycloSPORINE Your last dose was: Your next dose is:    
   
   
 1 Drop two (2) times a day. 1 Drop  
    
   
   
   
  
 sertraline 50 mg tablet Commonly known as:  ZOLOFT Your last dose was: Your next dose is: TAKE 1 TABLET BY MOUTH EVERY MORNING  
     
   
   
   
  
 SYMBICORT 160-4.5 mcg/actuation Hfaa Generic drug:  budesonide-formoterol Your last dose was: Your next dose is: Take 2 Puffs by inhalation two (2) times daily as needed. 2 Puff  
    
   
   
   
  
 valsartan 80 mg tablet Commonly known as:  DIOVAN Your last dose was: Your next dose is: Take 1 Tab by mouth daily. 80 mg  
    
   
   
   
  
 * Notice: This list has 2 medication(s) that are the same as other medications prescribed for you. Read the directions carefully, and ask your doctor or other care provider to review them with you. Where to Get Your Medications Information on where to get these meds will be given to you by the nurse or doctor. ! Ask your nurse or doctor about these medications HYDROcodone-acetaminophen 5-325 mg per tablet Opioid Education Prescription Opioids: What You Need to Know: 
 
Prescription opioids can be used to help relieve moderate-to-severe pain and are often prescribed following a surgery or injury, or for certain health conditions. These medications can be an important part of treatment but also come with serious risks. Opioids are strong pain medicines. Examples include hydrocodone, oxycodone, fentanyl, and morphine. Heroin is an example of an illegal opioid. It is important to work with your health care provider to make sure you are getting the safest, most effective care. WHAT ARE THE RISKS AND SIDE EFFECTS OF OPIOID USE? Prescription opioids carry serious risks of addiction and overdose, especially with prolonged use. An opioid overdose, often marked by slow breathing, can cause sudden death. The use of prescription opioids can have a number of side effects as well, even when taken as directed. · Tolerance-meaning you might need to take more of a medication for the same pain relief · Physical dependence-meaning you have symptoms of withdrawal when the medication is stopped. Withdrawal symptoms can include nausea, sweating, chills, diarrhea, stomach cramps, and muscle aches. Withdrawal can last up to several weeks, depending on which drug you took and how long you took it. · Increased sensitivity to pain · Constipation · Nausea, vomiting, and dry mouth · Sleepiness and dizziness · Confusion · Depression · Low levels of testosterone that can result in lower sex drive, energy, and strength · Itching and sweating RISKS ARE GREATER WITH:      
· History of drug misuse, substance use disorder, or overdose · Mental health conditions (such as depression or anxiety) · Sleep apnea · Older age (72 years or older) · Pregnancy Avoid alcohol while taking prescription opioids. Also, unless specifically advised by your health care provider, medications to avoid include: · Benzodiazepines (such as Xanax or Valium) · Muscle relaxants (such as Soma or Flexeril) · Hypnotics (such as Ambien or Lunesta) · Other prescription opioids KNOW YOUR OPTIONS Talk to your health care provider about ways to manage your pain that don't involve prescription opioids. Some of these options may actually work better and have fewer risks and side effects. Options may include: 
· Pain relievers such as acetaminophen, ibuprofen, and naproxen · Some medications that are also used for depression or seizures · Physical therapy and exercise · Counseling to help patients learn how to cope better with triggers of pain and stress. · Application of heat or cold compress · Massage therapy · Relaxation techniques Be Informed Make sure you know the name of your medication, how much and how often to take it, and its potential risks & side effects. IF YOU ARE PRESCRIBED OPIOIDS FOR PAIN: 
· Never take opioids in greater amounts or more often than prescribed. Remember the goal is not to be pain-free but to manage your pain at a tolerable level. · Follow up with your primary care provider to: · Work together to create a plan on how to manage your pain. · Talk about ways to help manage your pain that don't involve prescription opioids. · Talk about any and all concerns and side effects. · Help prevent misuse and abuse. · Never sell or share prescription opioids · Help prevent misuse and abuse. · Store prescription opioids in a secure place and out of reach of others (this may include visitors, children, friends, and family). · Safely dispose of unused/unwanted prescription opioids: Find your community drug take-back program or your pharmacy mail-back program, or flush them down the toilet, following guidance from the Food and Drug Administration (www.fda.gov/Drugs/ResourcesForYou). · Visit www.cdc.gov/drugoverdose to learn about the risks of opioid abuse and overdose. · If you believe you may be struggling with addiction, tell your health care provider and ask for guidance or call [address] [place] [address] at [phone]. Discharge Instructions [address] [place] [address], [address] [place] [address] Thea Puckettjennifer 
KRIS [phone]  F [phone] Patient Discharge Instructions Emelyn Navarro / [id number] : 1960 Admitted 2018 Discharged: 2018 Take Home Medications No current facility-administered medications on file prior to encounter. Current Outpatient Prescriptions on File Prior to Encounter Medication Sig Dispense Refill  LINZESS 145 mcg cap capsule TAKE ONE CAPSULE DAILY  0  
 LUBRICANT EYE 57.3-42.5 % oint ophthalmic ointment APPLY TOPICALLY INTO BOTH EYES EVERY DAY FOR 3 MONTHS  3  
 levothyroxine (SYNTHROID) 150 mcg tablet TAKE 1 TAB BY MOUTH DAILY (BEFORE BREAKFAST). --CALL [phone] FOR APPOINTMENT FOR MORE REFILLS 90 Tab 0  Liraglutide (VICTOZA) 0.6 mg/0.1 mL (18 mg/3 mL) pnij 1.8 mg by SubCUTAneous route daily (with lunch). 3 Pen 3  
 insulin detemir (LEVEMIR FLEXTOUCH) 100 unit/mL (3 mL) inpn INJECT 30 UNITS IN AM AND 50 UNITS AT NIGHT. INCREASE AS DIRECTED TO  UNITS PER DAY--CALL [phone] FOR APPOINTMENT FOR MORE REFILLS 30 mL 3  
 insulin aspart (NOVOLOG FLEXPEN) 100 unit/mL inpn INJECT 20 UNITS BEFORE EACH MEAL + 4 UNITS FOR EVERY 50 MG/DL ABOVE 150 MG/DL.  UNITS PER DAY 30 mL 11  
 potassium chloride (KLOR-CON M10) 10 mEq tablet TAKE 1 TAB BY MOUTH TWO (2) DAYS A WEEK. WED AND FRI 90 Tab 1  
 SYMBICORT 160-4.5 mcg/actuation HFAA Take 2 Puffs by inhalation two (2) times daily as needed. 2 Inhaler 3  
 valsartan (DIOVAN) 80 mg tablet Take 1 Tab by mouth daily. 30 Tab 5  
 montelukast (SINGULAIR) 10 mg tablet TAKE 1 TAB BY MOUTH DAILY. 90 Tab 1  
 aspirin delayed-release 81 mg tablet Take  by mouth daily.  fluticasone (FLONASE) 50 mcg/actuation nasal spray 2 Sprays by Both Nostrils route daily.  cycloSPORINE (RESTASIS) 0.05 % ophthalmic emulsion 1 Drop two (2) times a day.  cholecalciferol, VITAMIN D3, (VITAMIN D3) 5,000 unit tab tablet Take 5,000 Units by mouth daily.  fexofenadine (ALLEGRA) 180 mg tablet Take 180 mg by mouth daily.  HYDROcodone-acetaminophen (NORCO) 5-325 mg per tablet Take 1 Tab by mouth every four (4) hours as needed for Pain. Max Daily Amount: 6 Tabs.  30 Tab 0  
 naloxone (EVZIO) 2 mg/0.4 mL auto-injector 0.4 mL by IntraMUSCular route once as needed for Overdose for up to 1 dose. 1 Device 1  
 Dexlansoprazole (DEXILANT) 60 mg CpDB Take 1 Cap by mouth daily. 30 Cap 11  
 omega-3 acid ethyl esters (LOVAZA) 1 gram capsule TAKE ONE CAPSULE BY MOUTH TWICE A  Cap 2  
 diphenhydrAMINE (BENADRYL) 25 mg capsule Take 25 mg by mouth as needed.  albuterol (PROVENTIL VENTOLIN) 2.5 mg /3 mL (0.083 %) nebulizer solution USE 1 VAIL VIA NEBULIZER EVERY 4 HOURS AS NEEDED FOR WHEEZING 3 Package 1  
 ondansetron (ZOFRAN ODT) 8 mg disintegrating tablet Take 1 Tab by mouth every eight (8) hours as needed for Nausea. 30 Tab 3  BD INSULIN PEN NEEDLE UF SHORT 31 gauge x 5/16\" ndle USE WITH INSULIN TO INJECT FIVE TIMES DAILY. 200 Pen Needle 11  
 clonazePAM (KLONOPIN) 0.5 mg tablet Take 0.5 mg by mouth nightly as needed.  nystatin (MYCOSTATIN) powder Apply  to affected area four (4) times daily as needed.  sertraline (ZOLOFT) 50 mg tablet TAKE 1 TABLET BY MOUTH EVERY MORNING  1  
 furosemide (LASIX) 40 mg tablet Take 80 mg by mouth as needed. 3  
 albuterol (PROVENTIL HFA, VENTOLIN HFA, PROAIR HFA) 90 mcg/actuation inhaler Take 2 Puffs by inhalation every four (4) hours as needed for Wheezing. · It is important that you take the medication exactly as they are prescribed. · Keep your medication in the bottles provided by the pharmacist and keep a list of the medication names, dosages, and times to be taken in your wallet. · Do not take other medications without consulting your doctor. What to do at [facility] Recommended diet: Regular Diet, stay hydrated. You should take a stool softener such as colace for constipation. If constipation persists for >24 hours you should take a dose of Milk of Magnesia. Call if your constipation persists. Keep dry, may shower Recommended activity: No driving for 1 week and/or while on pain medication Walk at least 6 times per day Showers okay, no tub bathing/swimming for two weeks Activity as able, stairs okay. If you experience any of the following persisting symptoms: 
 
Nausea/vomiting, fever > 101 F, diarrhea/constipation, increasing pain despite medication, increasing vaginal discharge or any concerns, please call our office. Follow-up with Dr. Sara Becker in 3 week. Call [phone] for an appointment. Information obtained by : 
I understand that if any problems occur once I am at home I am to contact my physician. I understand and acknowledge receipt of the instructions indicated above. Physician's or R.N.'s Signature                                                                  Date/Time Patient or Representative Signature                                                          Date/Time 
 
 
______________________________________________________________________ Anesthesia Discharge Instructions After general anesthesia or intervenous sedation, for 24 hours or while taking prescription Narcotics: · Limit your activities · Do not drive or operate hazardous machinery · If you have not urinated within 8 hours after discharge, please contact your surgeon on call. · Do not make important personal or business decisions · Do not drink alcoholic beverages Report the following to your surgeon: 
· Excessive pain, swelling, redness or odor of or around the surgical area · Temperature over 100.5 degrees · Nausea and vomiting lasting longer than 4 hours or if unable to take medication · Any signs of decreased circulation or nerve impairment to extremity:  Change in color, persistent numbness, tingling, coldness or increased pain. · Any questions Introducing [facility]! Deajohn Garcia January: Thank you for requesting a [employer] Cover[employer] account. Our records indicate that you already have an active First Coverage account. You can access your account anytime at [email or web address] Did you know that you can access your hospital and ER discharge instructions at any time in First Coverage? You can also review all of your test results from your hospital stay or ER visit. Additional Information If you have questions, please visit the Frequently Asked Questions section of the First Coverage website at [email or web address] Remember, First Coverage is NOT to be used for urgent needs. For medical emergencies, dial 911. Now available from your iPhone and Android! Introducing Chau Evans As a [facility] patient, I wanted to make you aware of our electronic visit tool called Chau Cristina. [facility] 24/7 allows you to connect within minutes with a medical provider 24 hours a day, seven days a week via a mobile device or tablet or logging into a secure website from your computer. You can access Chau Evans from anywhere in the [place]. A virtual visit might be right for you when you have a simple condition and feel like you just dont want to get out of bed, or cant get away from work for an appointment, when your regular [facility] provider is not available (evenings, weekends or holidays), or when youre out of town and need minor care. Electronic visits cost only $49 and if the [facility] [place] provider determines a prescription is needed to treat your condition, one can be electronically transmitted to a nearby pharmacy*. Please take a moment to enroll today if you have not already done so. The enrollment process is free and takes just a few minutes. To enroll, please download the [facility] pat to your tablet or phone, or visit [email or web address] to enroll on your computer. And, as an [address] [place] [address] patient with a [employer] account, the results of your visits will be scanned into your electronic medical record and your primary care provider will be able to view the scanned results. We urge you to continue to see your regular [employer] provider for your ongoing medical care. And while your primary care provider may not be the one available when you seek a Chau Shahfin virtual visit, the peace of mind you get from getting a real diagnosis real time can be priceless. For more information on Chau Shahfin, view our Frequently Asked Questions (FAQs) at [email or web address]. Sincerely, 
 
Bianca Rosas MD 
Chief Medical Officer [employer] *:  certain medications cannot be prescribed via [facility] Unresulted Labs-Please follow up with your PCP about these lab tests Order Current Status XR FLUOROSCOPY UNDER 60 MINUTES In process Providers Seen During Your Hospitalization Provider Specialty Primary office phone Leonides Sierra MD Gynecologic Oncology [phone] Your Primary Care Physician (PCP) Primary Care Physician Office Phone Office Fax Sanjayjeff Staples [phone] You are allergic to the following Allergen Reactions Iodinated Contrast- Oral And Iv Dye Rash 13 hr pre-medication prior to IV contrast  
    
 Carboplatin Other (comments) Patient developed shortness of breath, felt faint, coughing, skin flushed and \"itchy\". This occurred on the patients 8th treatment. Lipitor (Atorvastatin) Myalgia Percocet (Oxycodone-Acetaminophen) Other (comments) fever Shellfish Containing Products Hives Tape (Adhesive) Itching Other (comments) \"op site-- clear,thin tape\"--caused blister Tomato Hives Recent Documentation Height Weight BMI OB Status Smoking Status 1.74 m 140.6 kg 46.45 kg/m2 Hysterectomy Never Smoker Emergency Contacts Name Discharge Info Relation Home Work Mobile [facility] Ian CAREGIVER [3] Son [22] [phone] Patient Belongings The following personal items are in your possession at time of discharge: 
  Dental Appliances: None  Visual Aid: Glasses      Home Medications: None   Jewelry: None  Clothing:  (clothing to PACU)    Other Valuables: Eyeglasses Discharge Instructions Attachments/References MEFS - HYDROCODONE/ACETAMINOPHEN (VICODIN, Ford Mauro, LORTAB) - (BY MOUTH) (ENGLISH) Patient Handouts Hydrocodone/Acetaminophen (Vicodin, Norco, Lortab) - (By mouth) Why this medicine is used:  
Treats pain. Contact a nurse or doctor right away if you have: · Blistering, peeling, red skin rash · Fast or slow heartbeat, shallow breathing, blue lips, fingernails, or skin · Anxiety, restlessness, muscle spasms, twitching, seeing or hearing things that are not there · Dark urine or pale stools, yellow skin or eyes · Extreme weakness, sweating, seizures, cold or clammy skin · Lightheadedness, dizziness, fainting, fever, sweating Common side effects: 
· Constipation, nausea, vomiting, loss of appetite, stomach pain · Tiredness or sleepiness © 2017 [address] Information is for End User's use only and may not be sold, redistributed or otherwise used for commercial purposes. Please provide this summary of care documentation to your next provider. Signatures-by signing, you are acknowledging that this After Visit Summary has been reviewed with you and you have received a copy. Patient Signature:  ____________________________________________________________ Date:  ____________________________________________________________  
  
Fayrene Retort  Provider Signature: ____________________________________________________________ Date:  ____________________________________________________________

## 2019-04-18 DIAGNOSIS — C56.9 MALIGNANT NEOPLASM OF OVARY, UNSPECIFIED LATERALITY (HCC): Primary | ICD-10-CM

## 2019-04-18 RX ORDER — LINACLOTIDE 145 UG/1
CAPSULE, GELATIN COATED ORAL
Qty: 30 CAP | Refills: 0 | Status: ON HOLD | OUTPATIENT
Start: 2019-04-18 | End: 2021-01-01

## 2019-04-19 LAB
ALBUMIN SERPL-MCNC: 4.1 G/DL (ref 3.5–5.5)
ALBUMIN/GLOB SERPL: 1.1 {RATIO} (ref 1.2–2.2)
ALP SERPL-CCNC: 86 IU/L (ref 39–117)
ALT SERPL-CCNC: 15 IU/L (ref 0–32)
AST SERPL-CCNC: 32 IU/L (ref 0–40)
BASOPHILS # BLD AUTO: 0 X10E3/UL (ref 0–0.2)
BASOPHILS NFR BLD AUTO: 0 %
BILIRUB SERPL-MCNC: 0.4 MG/DL (ref 0–1.2)
BUN SERPL-MCNC: 34 MG/DL (ref 6–24)
BUN/CREAT SERPL: 23 (ref 9–23)
CALCIUM SERPL-MCNC: 9.5 MG/DL (ref 8.7–10.2)
CANCER AG125 SERPL-ACNC: 360.8 U/ML (ref 0–38.1)
CHLORIDE SERPL-SCNC: 106 MMOL/L (ref 96–106)
CO2 SERPL-SCNC: 21 MMOL/L (ref 20–29)
CREAT SERPL-MCNC: 1.45 MG/DL (ref 0.57–1)
EOSINOPHIL # BLD AUTO: 0.6 X10E3/UL (ref 0–0.4)
EOSINOPHIL NFR BLD AUTO: 7 %
ERYTHROCYTE [DISTWIDTH] IN BLOOD BY AUTOMATED COUNT: 15.8 % (ref 12.3–15.4)
GLOBULIN SER CALC-MCNC: 3.8 G/DL (ref 1.5–4.5)
GLUCOSE SERPL-MCNC: 143 MG/DL (ref 65–99)
HCT VFR BLD AUTO: 26 % (ref 34–46.6)
HGB BLD-MCNC: 8.1 G/DL (ref 11.1–15.9)
IMM GRANULOCYTES # BLD AUTO: 0 X10E3/UL (ref 0–0.1)
IMM GRANULOCYTES NFR BLD AUTO: 0 %
LYMPHOCYTES # BLD AUTO: 1.6 X10E3/UL (ref 0.7–3.1)
LYMPHOCYTES NFR BLD AUTO: 19 %
MAGNESIUM SERPL-MCNC: 2.1 MG/DL (ref 1.6–2.3)
MCH RBC QN AUTO: 28.9 PG (ref 26.6–33)
MCHC RBC AUTO-ENTMCNC: 31.2 G/DL (ref 31.5–35.7)
MCV RBC AUTO: 93 FL (ref 79–97)
MONOCYTES # BLD AUTO: 0.8 X10E3/UL (ref 0.1–0.9)
MONOCYTES NFR BLD AUTO: 9 %
NEUTROPHILS # BLD AUTO: 5.5 X10E3/UL (ref 1.4–7)
NEUTROPHILS NFR BLD AUTO: 65 %
PLATELET # BLD AUTO: 236 X10E3/UL (ref 150–379)
POTASSIUM SERPL-SCNC: 5.6 MMOL/L (ref 3.5–5.2)
PROT SERPL-MCNC: 7.9 G/DL (ref 6–8.5)
RBC # BLD AUTO: 2.8 X10E6/UL (ref 3.77–5.28)
SODIUM SERPL-SCNC: 141 MMOL/L (ref 134–144)
WBC # BLD AUTO: 8.5 X10E3/UL (ref 3.4–10.8)

## 2019-04-23 ENCOUNTER — OFFICE VISIT (OUTPATIENT)
Dept: GYNECOLOGY | Age: 59
End: 2019-04-23

## 2019-04-23 VITALS
HEIGHT: 69 IN | DIASTOLIC BLOOD PRESSURE: 74 MMHG | WEIGHT: 293 LBS | SYSTOLIC BLOOD PRESSURE: 143 MMHG | BODY MASS INDEX: 43.4 KG/M2 | HEART RATE: 94 BPM

## 2019-04-23 DIAGNOSIS — Z79.899 ON ANTINEOPLASTIC CHEMOTHERAPY: ICD-10-CM

## 2019-04-23 DIAGNOSIS — C56.9 MALIGNANT NEOPLASM OF OVARY, UNSPECIFIED LATERALITY (HCC): Primary | ICD-10-CM

## 2019-04-23 DIAGNOSIS — R22.2 CHEST WALL MASS: ICD-10-CM

## 2019-04-23 DIAGNOSIS — R59.0 LYMPHADENOPATHY, MEDIASTINAL: ICD-10-CM

## 2019-04-23 DIAGNOSIS — E66.01 MORBID OBESITY (HCC): ICD-10-CM

## 2019-04-23 NOTE — PROGRESS NOTES
one month follow up on Douglas Sierra, labs drawn on 4/18/19 1. Have you been to the ER, urgent care clinic since your last visit? Hospitalized since your last visit?  no 
 
2. Have you seen or consulted any other health care providers outside of the [address] [place] [address] since your last visit? Include any pap smears or colon screening.    no

## 2019-04-23 NOTE — PROGRESS NOTES
[address] [facility], [address] [place], [facility] [facility] 
P [phone]  F [phone] Office Visit Patient ID: 
Name: Kristen Day MRN: [id number] : 1960/58 y.o. Visit date: 2019 LEONID Palafox is a 63 y.o.  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 The patient's previous treatment procedures include primary Taxol/carbo with retreatment in  and Doxil/Avastin, topotecan, gemzar and now single agent weekly Taxol initiated 18 following progression with chest wall involvement. In 2018, patient did not follow up and self-discontinued her treatment regimen. She presented to the ER on 18 with SOB. Negative workup for PE or MI. Admitted to Hospitalist service from -18. Managed for acute asthma exacerbation, acute FELICIA, and hyperkalemia. The patient was initiated on Llana Messenger on 19. Underwent repeat CT C/A/P on 19 for purposes of following response to treatment. Patient was tolerating Llana Messenger well for the first month, but then had a rise in her creatinine. However, she was also found to have hydronephrosis. Underwent right ureteral stent placement on 3/18/19. Restarted Zejula afterwards, although with continued rise in creatinine to >2. Denies current shortness of breath. Does report some pain in her chest where the mass is located. Denies cough or hemoptysis. Denies nausea, vomiting, constipation or diarrhea. Had some constipation when she started Llana Messenger, but this has resolved with stool softeners. Denies fevers or chills. Does report that her appetite is a little less.  
   
OncTx History: 
12: MARAH/BSO/Cytoreductive surgery on 12 noted carcinomatosis/omental caking. 10/2012-2013: 6 cycles Carbo/Taxol 2014-2014: 6 Cycles carbo/Taxol 3/2015-5/13/15: 3 cycles Avastin/Doxil until progression 7/2015-8/2015 Weekly Topotecan  
12/2015-3/2016: Weekly Topotecan 2/2017-9/2017Kennyth Daily D1/D8 Q28 days for 6 cycles 4/2018: CT core needle bx chest wall mass: Metastatic high-grade serous carcinoma (see comment) General Categorization Positive for malignancy. 5/8/2018-Present: Taxol  F6,2,45; S/P  Cycle #4  8/14/2018. Patient discontinued treatment secondary to side effects and fatigue. 1/23/2019 - Initiated Amisha . Held do to rising creatinine. However, she was also found to have hydronephrosis. Underwent right ureteral stent placement on 3/18/19. Imaging Review:  
CT C/A/P 2/5/19: 
FINDINGS:  
THYROID: No nodule. MEDIASTINUM: Mediastinal lymphadenopathy is unchanged. A reference superior 
mediastinal lymph node is stable measuring 3.3 cm. SWATI: No mass or lymphadenopathy. THORACIC AORTA: No dissection or aneurysm. MAIN PULMONARY ARTERY: Normal in caliber. TRACHEA/BRONCHI: Patent. ESOPHAGUS: No wall thickening or dilatation. HEART: Normal in size. PLEURA: No effusion or pneumothorax. LUNGS: No nodule, mass, or airspace disease. LIVER: Evaluation of the liver is limited by lack of intravenous contrast. There 
is a vague 4.4 cm hypodense lesion in the left hepatic lobe previously measuring 3.0 cm. Lesion in the right hepatic lobe now measures 3.3 cm, previously 
measuring 2.7 cm. GALLBLADDER: Unremarkable. SPLEEN: Central soft tissue abnormality has increased in size, now measuring 4.9 
cm, previously measuring 3.1 cm. PANCREAS: Not well evaluated given the massive lymphadenopathy and lack of 
intravenous contrast. 
ADRENALS: Unremarkable. KIDNEYS: There is no moderate right hydronephrosis. STOMACH: Unremarkable. SMALL BOWEL: No dilatation or wall thickening. COLON: No dilatation or wall thickening. APPENDIX: Not visualized. PERITONEUM: Bulky gastrohepatic, periceliac, portacaval, and mesenteric 
lymphadenopathy has increased. There is secondary invasion of the liver. Maximum dimension in the upper abdomen is currently 10.4 cm, previously 9.3 cm. Elemental soft tissue nodules have increased in size, with a reference 7.0 cm 
nodule in the left abdomen previously measuring 5.5 cm. Soft tissue nodule in 
the deep pelvis between the bladder and colon has increased in size as well. RETROPERITONEUM: No lymphadenopathy or aortic aneurysm. REPRODUCTIVE ORGANS: The uterus and ovaries are surgically absent. URINARY BLADDER: No mass or calculus. BONES: Soft tissue mass involving the right sternum has not changed. Degenerative changes are seen in the thoracic and lumbar spine. ADDITIONAL COMMENTS: N/A IMPRESSION IMPRESSION: 
Stable mediastinal lymphadenopathy. Progressive abdominal lymphadenopathy as 
described above. Liver lesions have increased in size compared to the prior 
exam. Stable right sternal mass. Moderate right hydroureteronephrosis is now 
noted which appears to be related to the lymphadenopathy. ROS Constitutional: no weight loss, fever, night sweats Respiratory: no cough, shortness of breath, or wheezing Cardiovascular: no chest pain or dyspnea on exertion. Reports chest wall mass smaller, no associated pain. Heme: No abnormal bleeding Gastrointestinal: no abdominal pain, change in bowel habits, or black or bloody stools Genito-Urinary: no dysuria, trouble voiding, or hematuria Musculoskeletal: + swelling in ankle - bilateral, mostly only at end of day Neurological: mild neuropathy Derm: pigmentation/induration medial left leg. Prior injury from fall. Psych: positive for depression - controlled PMH: 
Past Medical History:  
Diagnosis Date  Adverse effect of anesthesia   
 sleep apnea cpap machine useage  Anemia  Arthritis DDD low back and knees  Asthma   
 uses albuterol prn only; none x 6 mos. Never hospitalized  BRCA negative 1/2013  Calculus of kidney  Chronic kidney disease   
 kidney stones  Chronic pain knee pain bilat  CINV (chemotherapy-induced nausea and vomiting) 2014  Decreased hearing, right PATIENT STATES THIS IS DUE TO CHEMOTHERAPY  Diabetes ([address] Age 46 IDDM  Environmental allergies  Fibromyalgia  GERD (gastroesophageal reflux disease)   
 controlled with med  Hx of pulmonary embolus during pregnancy 2015  Hydronephrosis due to obstruction of ureter 3/18/2019  Hypertension  Ill-defined condition Sepsis  -  SOURCE PORT  
 Ill-defined condition 2016  
 chemotherapy  Mass of chest wall 2018  Morbid obesity ([address]  Murmur, heart  Other and unspecified hyperlipidemia  Ovarian cancer ([address] 2012, 2014  
 serous, high grade, stage IIIc. s/p chemotx (has port)  Psychiatric disorder Depression/Anxiety  Rectal bleeding  Thromboembolus ([address]  POST PARTUM; resolved- PELVIS  Thyroid disease HYPOTHYROID  Unspecified sleep apnea CPAP, Dr. Bloom Height PSH: 
Past Surgical History:  
Procedure Laterality Date  BREAST SURGERY PROCEDURE UNLISTED Lymph node in left breast 2017 Walter  
  X2  
 HX HYSTERECTOMY  2012 ROULA/BSO, tumor debulking, omentectomy for ovarian cancer  HX ORTHOPAEDIC Right   
 ankle-FX, R  
 HX ORTHOPAEDIC Right   
 FX R ARM  
 HX UROLOGICAL Right  STENT FOR KIDNEY STONES  
 HX VASCULAR ACCESS  2015  
 right chest- port placed  HX VASCULAR ACCESS Left 2017 TETE CATH  
 
SOC: 
Social History Socioeconomic History  Marital status:  Spouse name: Not on file  Number of children: Not on file  Years of education: Not on file  Highest education level: Not on file Occupational History  Not on file Social Needs  Financial resource strain: Not on file  Food insecurity:  
  Worry: Not on file Inability: Not on file  Transportation needs:  
  Medical: Not on file Non-medical: Not on file Tobacco Use  Smoking status: Never Smoker  Smokeless tobacco: Never Used Substance and Sexual Activity  Alcohol use: Yes Comment: 1 drink per month  Drug use: No  
 Sexual activity: Yes Lifestyle  Physical activity:  
  Days per week: Not on file Minutes per session: Not on file  Stress: Not on file Relationships  Social connections:  
  Talks on phone: Not on file Gets together: Not on file Attends [employer] service: Not on file Active member of club or organization: Not on file Attends meetings of clubs or organizations: Not on file Relationship status: Not on file  Intimate partner violence:  
  Fear of current or ex partner: Not on file Emotionally abused: Not on file Physically abused: Not on file Forced sexual activity: Not on file Other Topics Concern  Not on file Social History Narrative Lives in [facility] alone.  passed away in 5/16 of a heart attack. Has 2 sons. Disability. 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 Enjoys swimming and going to the gym. Family History Family History Problem Relation Age of Onset  Hypertension Mother Charlie.Lax Arthritis-osteo Mother  Hypertension Father  Arthritis-osteo Father  Cancer Father PROSTATE  COPD Father  Hypertension Brother  Elevated Lipids Brother  Arthritis-osteo Brother  Hypertension Brother  Elevated Lipids Brother  Obesity Brother  Cancer Brother PROSTATE  Anesth Problems Son DELAYED AWAKENING  
 Diabetes Maternal Grandmother  Anesth Problems Paternal Grandmother PATIENT STATES GRANDMOTHER'S HEART STOPPED DURING SURGERY Medications: (reviewed) Current Outpatient Medications on File Prior to Visit Medication Sig Dispense Refill  LINZESS 145 mcg cap capsule TAKE 1 CAPSULE BY MOUTH EVERY DAYY 30 Cap 0  
  levothyroxine (SYNTHROID) 150 mcg tablet TAKE 1 TABLET BY MOUTH EVERY DAY BEFORE BREAKFAST 90 Tab 0  
 EPINEPHrine (EPIPEN) 0.3 mg/0.3 mL injection INJECT 0.3 ML BY INTRAMUSCULAR ROUTE ONCE AS NEEDED FOR UP TO 1 DOSE.  1  
 ZEJULA 100 mg cap Take 100 mg by mouth two (2) times a day.  fluticasone propionate (FLONASE) 50 mcg/actuation nasal spray     
 amoxicillin (AMOXIL) 500 mg capsule Prior to dental procedures  oxyCODONE IR (ROXICODONE) 5 mg immediate release tablet Take 1 Tab by mouth every four (4) hours as needed for Pain. Max Daily Amount: 30 mg. 45 Tab 0  
 glucose blood VI test strips (TRUE METRIX GLUCOSE TEST STRIP) strip by Does Not Apply route See Admin Instructions. 30 Strip 5  
 lancets misc by Does Not Apply route daily. True Metrix lancets- test blood sugar once per day 30 Each 5  Blood-Glucose Meter monitoring kit Check up to tid, as directed 1 Kit 0  
 azelastine (ASTELIN) 137 mcg (0.1 %) nasal spray 1 [place] by Both Nostrils route daily as needed. Use in each nostril as directed  insulin detemir U-100 (LEVEMIR) 100 unit/mL injection 20 Units by SubCUTAneous route daily (with lunch).  montelukast (SINGULAIR) 10 mg tablet Take 10 mg by mouth nightly.  ascorbic acid, vitamin C, (VITAMIN C) 1,000 mg tablet Take 1,000 mg by mouth daily.  albuterol (PROVENTIL VENTOLIN) 2.5 mg /3 mL (0.083 %) nebulizer solution 2.5 mg by Nebulization route every four (4) hours as needed for Wheezing.  potassium chloride (KLOR-CON M10) 10 mEq tablet TAKE 1 TAB BY MOUTH TWO (2) DAYS A WEEK. WED AND FRI 90 Tab 1  
 telmisartan (MICARDIS) 20 mg tablet Take 1 Tab by mouth daily. 90 Tab 3  
 insulin aspart U-100 (NOVOLOG FLEXPEN U-100 INSULIN) 100 unit/mL inpn INJECT 20 UNITS BEFORE EACH MEAL + 4 UNITS FOR EVERY 50 MG/DL ABOVE 150 MG/DL.   UNITS PER DAY 30 mL 11  
 omega-3 acid ethyl esters (LOVAZA) 1 gram capsule TAKE ONE CAPSULE BY MOUTH TWICE A  Cap 2  
  Dexlansoprazole (DEXILANT) 60 mg CpDB Take 1 Cap by mouth daily. 90 Cap 3  
 Liraglutide (VICTOZA) 0.6 mg/0.1 mL (18 mg/3 mL) pnij 1.8 mg by SubCUTAneous route daily (with lunch). 3 Pen 3  
 SYMBICORT 160-4.5 mcg/actuation HFAA Take 2 Puffs by inhalation two (2) times daily as needed. 2 Inhaler 3  clonazePAM (KLONOPIN) 0.5 mg tablet Take 0.5 mg by mouth nightly as needed.  sertraline (ZOLOFT) 50 mg tablet TAKE 1 TABLET BY MOUTH as needed  1  cholecalciferol, VITAMIN D3, (VITAMIN D3) 5,000 unit tab tablet Take 5,000 Units by mouth daily.  furosemide (LASIX) 40 mg tablet Take 40 mg by mouth every other day. 3  
 ondansetron (ZOFRAN ODT) 4 mg disintegrating tablet Take 1 Tab by mouth every eight (8) hours as needed for Nausea. Indications: Nausea and Vomiting caused by Cancer Drugs 30 Tab 2 No current facility-administered medications on file prior to visit. Allergies: (reviewed) Allergies Allergen Reactions  Carboplatin Other (comments) Patient developed shortness of breath, felt faint, coughing, skin flushed and \"itchy\". This occurred on the patients 8th treatment.  Iodinated Contrast- Oral And Iv Dye Rash 13 hr pre-medication prior to IV contrast.  
Patient has done well with 1 hr pre-medication of (Solu-Medrol) 40mg &  (Benadryl) 50mg.  Lipitor [Atorvastatin] Myalgia  Shellfish Containing Products Hives  Tape [Adhesive] Itching and Other (comments) \"op site-- clear,thin tape\"--caused blister  Tomato Hives  Olmesartan Other (comments)  Losartan Other (comments)  
  headaches  Percocet [Oxycodone-Acetaminophen] Other (comments) Fever; however, current home med OBJECTIVE Physical Exam 
Visit Vitals /74 (BP 1 Location: Left arm, BP Patient Position: Sitting) Pulse 94 Ht 5' 9\" (1.753 m) Wt 295 lb (133.8 kg) LMP 09/07/2011 BMI 43.56 kg/m² Physical Exam: 
General: Alert and oriented. No acute distress. Well-nourished HEENT: No thyroid enlargment. Neck supple without restrictions. Sclera normal. Normal occular motion. Moist mucous membranes. Lymphatics: No evidence of axillary, cervical, or subclavicular adenopathy. Respiratory: clear to auscultation and percussion to the bases. No CVAT. No discernable chest wall mass on palpation, but clear on imaging. Cardiovascular: regular rate and rhythm. No murmurs, rubs, or gallops. Gastrointestinal: soft, non-tender, non-distended, no masses or organomegaly. Well-healed incision. Musculoskeletal: normal gait. No joint tenderness, deformity or swelling. No muscular tenderness. Extremities: extremities normal, atraumatic, mild 1+ edema bilaterally. Pelvic: deferred today Neuro: Grossly intact. Normal gait and movement. No acute deficit Skin: No evidence of rashes or skin changes. DATE REVIEW as available: 
Lab Results Component Value Date/Time WBC 8.5 04/18/2019 09:55 AM  
 Hemoglobin (POC) 10.5 (L) 03/20/2017 12:15 PM  
 HGB 8.1 (L) 04/18/2019 09:55 AM  
 Hematocrit (POC) 31 (L) 03/20/2017 12:15 PM  
 HCT 26.0 (L) 04/18/2019 09:55 AM  
 PLATELET 842 04/12/0150 09:55 AM  
 MCV 93 04/18/2019 09:55 AM  
 
Lab Results Component Value Date/Time  
 ABS. NEUTROPHILS 5.5 04/18/2019 09:55 AM  
 
Lab Results Component Value Date/Time Sodium 141 04/18/2019 09:55 AM  
 Potassium 5.6 (H) 04/18/2019 09:55 AM  
 Chloride 106 04/18/2019 09:55 AM  
 CO2 21 04/18/2019 09:55 AM  
 Anion gap 9 04/11/2019 09:53 AM  
 Glucose 143 (H) 04/18/2019 09:55 AM  
 BUN 34 (H) 04/18/2019 09:55 AM  
 Creatinine 1.45 (H) 04/18/2019 09:55 AM  
 BUN/Creatinine ratio 23 04/18/2019 09:55 AM  
 GFR est AA 46 (L) 04/18/2019 09:55 AM  
 GFR est non-AA 40 (L) 04/18/2019 09:55 AM  
 Calcium 9.5 04/18/2019 09:55 AM  
 Bilirubin, total 0.4 04/18/2019 09:55 AM  
 AST (SGOT) 32 04/18/2019 09:55 AM  
 Alk.  phosphatase 86 04/18/2019 09:55 AM  
 Protein, total 7.9 04/18/2019 09:55 AM  
 Albumin 4.1 04/18/2019 09:55 AM  
 Globulin 4.9 (H) 04/11/2019 09:53 AM  
 A-G Ratio 1.1 (L) 04/18/2019 09:55 AM  
 ALT (SGPT) 15 04/18/2019 09:55 AM  
 
 
ECHO 7/17/18 Left ventricle: Systolic function was normal. Ejection fraction was estimated in the range of 55 % to 60 %. There were no regional wall motion 
abnormalities. Wall thickness was mildly increased. Left atrium: The atrium was mildly dilated. Mitral valve: There was mild regurgitation. Aortic valve: Leaflets exhibited sclerosis without stenosis. Not well visualized. IMPRESSION AND PLAN: 
Ms. Landon Weston is a 63 y.o. female with recurrent, metastatic ovarian cancer. Heavily pre-treated. Lost to follow-up since 9/2018, at which time patient self-discontinued weekly paclitaxel. Initiated Raejean Custard on 1/23/19. Held after 1 month secondary to rising creatinine. Found to have right hydronephrosis. Right ureteral stent placed 3/18/19 with normalization of creatinine. ECOG 1 Problem List Items Addressed This Visit Immune Lymphadenopathy, mediastinal  
  
 Reproductive Ovarian cancer ([address] - Primary Other Morbid obesity ([address] Chest wall mass On antineoplastic chemotherapy Reviewed patient's course to date. Her creatinine has normalized. Given prior decrease in Ca-125 while on Zejula, would like to remain on Zejula. Her creatinine has normalized. Will restart at reduced dosing 100mg/daily. Will monitor labs weekly for this month while restarting Zejula. May consider increasing dose back up if her creatinine remains stable this time. While the patient is BRCA negative, there is some evidence that PARPi may provide some benefit to BRCA wild-type patients. Previously I discussed goals of care, including that any type of treatment at this time is palliative in nature.  I have reviewed her chemotherapy history, which is extensive; although the patient has been somewhat non-compliant with treatments with missed treatments \"here and there\". Stressed importance of remaining on treatment. Discussed that palliative therapy at this time has a response rate at best of 5-10% regardless of the type of treatment. Counseled patient regarding code status and advance care directives, which the patient is going to consider. She reports she has a \"living will\", but I am unsure as to her wishes regarding code status. Will plan to continue this dialog at future visits. All questions and concerns were addressed with the patient and she is comfortable with the plan.  
 
Geetha Birch MD

## 2019-04-30 RX ORDER — OMEGA-3-ACID ETHYL ESTERS 1 G/1
CAPSULE, LIQUID FILLED ORAL
Qty: 180 CAP | Refills: 2 | Status: SHIPPED | OUTPATIENT
Start: 2019-04-30 | End: 2020-01-20

## 2019-05-01 ENCOUNTER — TELEPHONE (OUTPATIENT)
Dept: GYNECOLOGY | Age: 59
End: 2019-05-01

## 2019-05-01 LAB
ALBUMIN SERPL-MCNC: 4.2 G/DL (ref 3.5–5.5)
ALBUMIN/GLOB SERPL: 0.9 {RATIO} (ref 1.2–2.2)
ALP SERPL-CCNC: 85 IU/L (ref 39–117)
ALT SERPL-CCNC: 18 IU/L (ref 0–32)
AST SERPL-CCNC: 39 IU/L (ref 0–40)
BASOPHILS # BLD AUTO: 0.1 X10E3/UL (ref 0–0.2)
BASOPHILS NFR BLD AUTO: 1 %
BILIRUB SERPL-MCNC: 0.3 MG/DL (ref 0–1.2)
BUN SERPL-MCNC: 30 MG/DL (ref 6–24)
BUN/CREAT SERPL: 17 (ref 9–23)
CALCIUM SERPL-MCNC: 9.9 MG/DL (ref 8.7–10.2)
CANCER AG125 SERPL-ACNC: 345.7 U/ML (ref 0–38.1)
CHLORIDE SERPL-SCNC: 106 MMOL/L (ref 96–106)
CO2 SERPL-SCNC: 19 MMOL/L (ref 20–29)
CREAT SERPL-MCNC: 1.78 MG/DL (ref 0.57–1)
EOSINOPHIL # BLD AUTO: 0.4 X10E3/UL (ref 0–0.4)
EOSINOPHIL NFR BLD AUTO: 4 %
ERYTHROCYTE [DISTWIDTH] IN BLOOD BY AUTOMATED COUNT: 15.2 % (ref 12.3–15.4)
GLOBULIN SER CALC-MCNC: 4.5 G/DL (ref 1.5–4.5)
GLUCOSE SERPL-MCNC: 116 MG/DL (ref 65–99)
HCT VFR BLD AUTO: 26.9 % (ref 34–46.6)
HGB BLD-MCNC: 8.7 G/DL (ref 11.1–15.9)
IMM GRANULOCYTES # BLD AUTO: 0 X10E3/UL (ref 0–0.1)
IMM GRANULOCYTES NFR BLD AUTO: 0 %
LYMPHOCYTES # BLD AUTO: 2.4 X10E3/UL (ref 0.7–3.1)
LYMPHOCYTES NFR BLD AUTO: 27 %
MAGNESIUM SERPL-MCNC: 1.7 MG/DL (ref 1.6–2.3)
MCH RBC QN AUTO: 29.5 PG (ref 26.6–33)
MCHC RBC AUTO-ENTMCNC: 32.3 G/DL (ref 31.5–35.7)
MCV RBC AUTO: 91 FL (ref 79–97)
MONOCYTES # BLD AUTO: 0.7 X10E3/UL (ref 0.1–0.9)
MONOCYTES NFR BLD AUTO: 8 %
NEUTROPHILS # BLD AUTO: 5.4 X10E3/UL (ref 1.4–7)
NEUTROPHILS NFR BLD AUTO: 60 %
PLATELET # BLD AUTO: 247 X10E3/UL (ref 150–379)
POTASSIUM SERPL-SCNC: 5.5 MMOL/L (ref 3.5–5.2)
PROT SERPL-MCNC: 8.7 G/DL (ref 6–8.5)
RBC # BLD AUTO: 2.95 X10E6/UL (ref 3.77–5.28)
SODIUM SERPL-SCNC: 140 MMOL/L (ref 134–144)
WBC # BLD AUTO: 9 X10E3/UL (ref 3.4–10.8)

## 2019-05-08 DIAGNOSIS — J06.9 UPPER RESPIRATORY TRACT INFECTION, UNSPECIFIED TYPE: ICD-10-CM

## 2019-05-08 LAB
ALBUMIN SERPL-MCNC: 4.1 G/DL (ref 3.5–5.5)
ALBUMIN/GLOB SERPL: 0.9 {RATIO} (ref 1.2–2.2)
ALP SERPL-CCNC: 92 IU/L (ref 39–117)
ALT SERPL-CCNC: 21 IU/L (ref 0–32)
AST SERPL-CCNC: 43 IU/L (ref 0–40)
BASOPHILS # BLD AUTO: 0.1 X10E3/UL (ref 0–0.2)
BASOPHILS NFR BLD AUTO: 1 %
BILIRUB SERPL-MCNC: 0.3 MG/DL (ref 0–1.2)
BUN SERPL-MCNC: 28 MG/DL (ref 6–24)
BUN/CREAT SERPL: 18 (ref 9–23)
CALCIUM SERPL-MCNC: 9.9 MG/DL (ref 8.7–10.2)
CANCER AG125 SERPL-ACNC: 347.8 U/ML (ref 0–38.1)
CHLORIDE SERPL-SCNC: 105 MMOL/L (ref 96–106)
CO2 SERPL-SCNC: 19 MMOL/L (ref 20–29)
CREAT SERPL-MCNC: 1.6 MG/DL (ref 0.57–1)
EOSINOPHIL # BLD AUTO: 0.6 X10E3/UL (ref 0–0.4)
EOSINOPHIL NFR BLD AUTO: 7 %
ERYTHROCYTE [DISTWIDTH] IN BLOOD BY AUTOMATED COUNT: 15.4 % (ref 12.3–15.4)
GLOBULIN SER CALC-MCNC: 4.4 G/DL (ref 1.5–4.5)
GLUCOSE SERPL-MCNC: 113 MG/DL (ref 65–99)
HCT VFR BLD AUTO: 27.2 % (ref 34–46.6)
HGB BLD-MCNC: 8.9 G/DL (ref 11.1–15.9)
IMM GRANULOCYTES # BLD AUTO: 0 X10E3/UL (ref 0–0.1)
IMM GRANULOCYTES NFR BLD AUTO: 0 %
LYMPHOCYTES # BLD AUTO: 2.2 X10E3/UL (ref 0.7–3.1)
LYMPHOCYTES NFR BLD AUTO: 25 %
MAGNESIUM SERPL-MCNC: 1.7 MG/DL (ref 1.6–2.3)
MCH RBC QN AUTO: 28.7 PG (ref 26.6–33)
MCHC RBC AUTO-ENTMCNC: 32.7 G/DL (ref 31.5–35.7)
MCV RBC AUTO: 88 FL (ref 79–97)
MONOCYTES # BLD AUTO: 0.6 X10E3/UL (ref 0.1–0.9)
MONOCYTES NFR BLD AUTO: 7 %
NEUTROPHILS # BLD AUTO: 5.3 X10E3/UL (ref 1.4–7)
NEUTROPHILS NFR BLD AUTO: 60 %
PLATELET # BLD AUTO: 219 X10E3/UL (ref 150–379)
POTASSIUM SERPL-SCNC: 5.4 MMOL/L (ref 3.5–5.2)
PROT SERPL-MCNC: 8.5 G/DL (ref 6–8.5)
RBC # BLD AUTO: 3.1 X10E6/UL (ref 3.77–5.28)
SODIUM SERPL-SCNC: 140 MMOL/L (ref 134–144)
WBC # BLD AUTO: 8.8 X10E3/UL (ref 3.4–10.8)

## 2019-05-08 RX ORDER — FLUTICASONE PROPIONATE 50 MCG
SPRAY, SUSPENSION (ML) NASAL
Qty: 32 G | Refills: 3 | Status: SHIPPED | OUTPATIENT
Start: 2019-05-08 | End: 2019-11-04 | Stop reason: SDUPTHER

## 2019-05-09 ENCOUNTER — APPOINTMENT (OUTPATIENT)
Dept: INFUSION THERAPY | Age: 59
End: 2019-05-09
Payer: MEDICARE

## 2019-05-09 ENCOUNTER — TELEPHONE (OUTPATIENT)
Dept: GYNECOLOGY | Age: 59
End: 2019-05-09

## 2019-05-09 NOTE — TELEPHONE ENCOUNTER
Lab results from 5/7/19 reviewed with pt. She is to continue her Zejula 100mg daily and repeat labs in one week 5/14/19.

## 2019-05-16 ENCOUNTER — HOSPITAL ENCOUNTER (OUTPATIENT)
Dept: INFUSION THERAPY | Age: 59
Discharge: HOME | End: 2019-05-16
Payer: MEDICARE

## 2019-05-16 VITALS
SYSTOLIC BLOOD PRESSURE: 145 MMHG | HEART RATE: 91 BPM | TEMPERATURE: 97.6 F | DIASTOLIC BLOOD PRESSURE: 80 MMHG | RESPIRATION RATE: 18 BRPM

## 2019-05-16 LAB
ALBUMIN SERPL-MCNC: 3.2 G/DL (ref 3.5–5)
ALBUMIN/GLOB SERPL: 0.6 {RATIO} (ref 1.1–2.2)
ALP SERPL-CCNC: 86 U/L (ref 45–117)
ALT SERPL-CCNC: 26 U/L (ref 12–78)
ANION GAP SERPL CALC-SCNC: 7 MMOL/L (ref 5–15)
AST SERPL-CCNC: 39 U/L (ref 15–37)
BASOPHILS # BLD: 0.1 K/UL (ref 0–0.1)
BASOPHILS NFR BLD: 1 % (ref 0–1)
BILIRUB SERPL-MCNC: 0.3 MG/DL (ref 0.2–1)
BUN SERPL-MCNC: 30 MG/DL (ref 6–20)
BUN/CREAT SERPL: 19 (ref 12–20)
CALCIUM SERPL-MCNC: 9.2 MG/DL (ref 8.5–10.1)
CANCER AG125 SERPL-ACNC: 185 U/ML (ref 1.5–35)
CHLORIDE SERPL-SCNC: 109 MMOL/L (ref 97–108)
CO2 SERPL-SCNC: 23 MMOL/L (ref 21–32)
CREAT SERPL-MCNC: 1.58 MG/DL (ref 0.55–1.02)
DIFFERENTIAL METHOD BLD: ABNORMAL
EOSINOPHIL # BLD: 0.6 K/UL (ref 0–0.4)
EOSINOPHIL NFR BLD: 8 % (ref 0–7)
ERYTHROCYTE [DISTWIDTH] IN BLOOD BY AUTOMATED COUNT: 14.5 % (ref 11.5–14.5)
GLOBULIN SER CALC-MCNC: 5.2 G/DL (ref 2–4)
GLUCOSE SERPL-MCNC: 134 MG/DL (ref 65–100)
HCT VFR BLD AUTO: 26.2 % (ref 35–47)
HGB BLD-MCNC: 7.9 G/DL (ref 11.5–16)
IMM GRANULOCYTES # BLD AUTO: 0 K/UL (ref 0–0.04)
IMM GRANULOCYTES NFR BLD AUTO: 0 % (ref 0–0.5)
LYMPHOCYTES # BLD: 1.5 K/UL (ref 0.8–3.5)
LYMPHOCYTES NFR BLD: 21 % (ref 12–49)
MAGNESIUM SERPL-MCNC: 1.6 MG/DL (ref 1.6–2.4)
MCH RBC QN AUTO: 29.7 PG (ref 26–34)
MCHC RBC AUTO-ENTMCNC: 30.2 G/DL (ref 30–36.5)
MCV RBC AUTO: 98.5 FL (ref 80–99)
MONOCYTES # BLD: 0.6 K/UL (ref 0–1)
MONOCYTES NFR BLD: 8 % (ref 5–13)
NEUTS SEG # BLD: 4.5 K/UL (ref 1.8–8)
NEUTS SEG NFR BLD: 62 % (ref 32–75)
NRBC # BLD: 0 K/UL (ref 0–0.01)
NRBC BLD-RTO: 0 PER 100 WBC
PLATELET # BLD AUTO: 172 K/UL (ref 150–400)
PMV BLD AUTO: 9.4 FL (ref 8.9–12.9)
POTASSIUM SERPL-SCNC: 4.6 MMOL/L (ref 3.5–5.1)
PROT SERPL-MCNC: 8.4 G/DL (ref 6.4–8.2)
RBC # BLD AUTO: 2.66 M/UL (ref 3.8–5.2)
SODIUM SERPL-SCNC: 139 MMOL/L (ref 136–145)
WBC # BLD AUTO: 7.3 K/UL (ref 3.6–11)

## 2019-05-16 PROCEDURE — 36591 DRAW BLOOD OFF VENOUS DEVICE: CPT

## 2019-05-16 PROCEDURE — 85025 COMPLETE CBC W/AUTO DIFF WBC: CPT

## 2019-05-16 PROCEDURE — 83735 ASSAY OF MAGNESIUM: CPT

## 2019-05-16 PROCEDURE — 36415 COLL VENOUS BLD VENIPUNCTURE: CPT

## 2019-05-16 PROCEDURE — 74011250636 HC RX REV CODE- 250/636: Performed by: OBSTETRICS & GYNECOLOGY

## 2019-05-16 PROCEDURE — 80053 COMPREHEN METABOLIC PANEL: CPT

## 2019-05-16 PROCEDURE — 86304 IMMUNOASSAY TUMOR CA 125: CPT

## 2019-05-16 PROCEDURE — 77030012965 HC NDL HUBR BBMI -A

## 2019-05-16 RX ORDER — SODIUM CHLORIDE 0.9 % (FLUSH) 0.9 %
5-10 SYRINGE (ML) INJECTION AS NEEDED
Status: DISCONTINUED | OUTPATIENT
Start: 2019-05-16 | End: 2019-05-17 | Stop reason: HOSPADM

## 2019-05-16 RX ORDER — SODIUM CHLORIDE 9 MG/ML
10 INJECTION INTRAMUSCULAR; INTRAVENOUS; SUBCUTANEOUS AS NEEDED
Status: DISCONTINUED | OUTPATIENT
Start: 2019-05-16 | End: 2019-05-17 | Stop reason: HOSPADM

## 2019-05-16 RX ORDER — HEPARIN 100 UNIT/ML
500 SYRINGE INTRAVENOUS AS NEEDED
Status: DISCONTINUED | OUTPATIENT
Start: 2019-05-16 | End: 2019-05-17 | Stop reason: HOSPADM

## 2019-05-16 RX ADMIN — Medication 10 ML: at 15:00

## 2019-05-16 RX ADMIN — SODIUM CHLORIDE 10 ML: 9 INJECTION INTRAMUSCULAR; INTRAVENOUS; SUBCUTANEOUS at 15:00

## 2019-05-16 RX ADMIN — Medication 500 UNITS: at 15:00

## 2019-05-16 NOTE — PROGRESS NOTES
Outpatient Infusion Center Short Visit Progress Note 1445 Pt admit to [facility] for port flush/labs ambulatory in stable condition. Assessment completed. Pt reports chronic R knee pain. No new concerns voiced. Patient Vitals for the past 12 hrs: 
 Temp Pulse Resp BP  
05/16/19 1445 97.6 °F (36.4 °C) 91 18 145/80 Port accessed with positive blood return. Labs drawn, flushed, heparinized and de-accessed per protocol. 1500 Pt tolerated treatment well. D/c home ambulatory in no distress. Pt aware of next appointment scheduled for 06/13/2019. Recent Results (from the past 12 hour(s)) CBC WITH AUTOMATED DIFF Collection Time: 05/16/19  2:49 PM  
Result Value Ref Range WBC 7.3 3.6 - 11.0 K/uL  
 RBC 2.66 (L) 3.80 - 5.20 M/uL HGB 7.9 (L) 11.5 - 16.0 g/dL HCT 26.2 (L) 35.0 - 47.0 % MCV 98.5 80.0 - 99.0 FL  
 MCH 29.7 26.0 - 34.0 PG  
 MCHC 30.2 30.0 - 36.5 g/dL  
 RDW 14.5 11.5 - 14.5 % PLATELET 179 382 - 072 K/uL MPV 9.4 8.9 - 12.9 FL  
 NRBC 0.0 0  WBC ABSOLUTE NRBC 0.00 0.00 - 0.01 K/uL NEUTROPHILS 62 32 - 75 % LYMPHOCYTES 21 12 - 49 % MONOCYTES 8 5 - 13 % EOSINOPHILS 8 (H) 0 - 7 % BASOPHILS 1 0 - 1 % IMMATURE GRANULOCYTES 0 0.0 - 0.5 % ABS. NEUTROPHILS 4.5 1.8 - 8.0 K/UL  
 ABS. LYMPHOCYTES 1.5 0.8 - 3.5 K/UL  
 ABS. MONOCYTES 0.6 0.0 - 1.0 K/UL  
 ABS. EOSINOPHILS 0.6 (H) 0.0 - 0.4 K/UL  
 ABS. BASOPHILS 0.1 0.0 - 0.1 K/UL  
 ABS. IMM. GRANS. 0.0 0.00 - 0.04 K/UL  
 DF AUTOMATED METABOLIC PANEL, COMPREHENSIVE Collection Time: 05/16/19  2:49 PM  
Result Value Ref Range Sodium 139 136 - 145 mmol/L Potassium 4.6 3.5 - 5.1 mmol/L Chloride 109 (H) 97 - 108 mmol/L  
 CO2 23 21 - 32 mmol/L Anion gap 7 5 - 15 mmol/L Glucose 134 (H) 65 - 100 mg/dL BUN 30 (H) 6 - 20 MG/DL Creatinine 1.58 (H) 0.55 - 1.02 MG/DL  
 BUN/Creatinine ratio 19 12 - 20 GFR est AA 41 (L) >60 ml/min/1.73m2 GFR est non-AA 34 (L) >60 ml/min/1.73m2 Calcium 9.2 8.5 - 10.1 MG/DL Bilirubin, total 0.3 0.2 - 1.0 MG/DL  
 ALT (SGPT) 26 12 - 78 U/L  
 AST (SGOT) 39 (H) 15 - 37 U/L Alk. phosphatase 86 45 - 117 U/L Protein, total 8.4 (H) 6.4 - 8.2 g/dL Albumin 3.2 (L) 3.5 - 5.0 g/dL Globulin 5.2 (H) 2.0 - 4.0 g/dL A-G Ratio 0.6 (L) 1.1 - 2.2 MAGNESIUM Collection Time: 05/16/19  2:49 PM  
Result Value Ref Range Magnesium 1.6 1.6 - 2.4 mg/dL CANCER ANTIGEN 125 Collection Time: 05/16/19  2:49 PM  
Result Value Ref Range CA-125 185 (H) 1.5 - 35.0 U/mL

## 2019-05-21 ENCOUNTER — OFFICE VISIT (OUTPATIENT)
Dept: GYNECOLOGY | Age: 59
End: 2019-05-21

## 2019-05-21 VITALS
BODY MASS INDEX: 43.01 KG/M2 | WEIGHT: 290.4 LBS | HEIGHT: 69 IN | DIASTOLIC BLOOD PRESSURE: 81 MMHG | SYSTOLIC BLOOD PRESSURE: 123 MMHG | HEART RATE: 86 BPM

## 2019-05-21 DIAGNOSIS — E11.21 TYPE 2 DIABETES MELLITUS WITH NEPHROPATHY (HCC): ICD-10-CM

## 2019-05-21 DIAGNOSIS — R22.2 CHEST MASS: ICD-10-CM

## 2019-05-21 DIAGNOSIS — R59.0 LYMPHADENOPATHY, MEDIASTINAL: ICD-10-CM

## 2019-05-21 DIAGNOSIS — C56.9 MALIGNANT NEOPLASM OF OVARY, UNSPECIFIED LATERALITY (HCC): Primary | ICD-10-CM

## 2019-05-21 DIAGNOSIS — Z79.899 ON ANTINEOPLASTIC CHEMOTHERAPY: ICD-10-CM

## 2019-05-21 DIAGNOSIS — R22.2 CHEST WALL MASS: ICD-10-CM

## 2019-05-21 NOTE — PROGRESS NOTES
labs were drawn on 5/16/19, monthly Zejula check, pt complains of constipation, taking Linzess it is not helping, has not tried anything else OTC, Initial blood pressure reading 159/82, repeat blood pressure reading 123/81      1. Have you been to the ER, urgent care clinic since your last visit? Hospitalized since your last visit?  no    2. Have you seen or consulted any other health care providers outside of the [address] [place] [address] since your last visit? Include any pap smears or colon screening.    no

## 2019-05-21 NOTE — PROGRESS NOTES
[address] [facility] [address], [address] [facility]  P [phone]  F [phone]    Office Visit    Patient ID:  Name: Tianna Munoz  MRN: [id number]  : 1960/58 y.o. Visit date: 2019    LEONID Crooks is a 63 y.o.  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 The patient's previous treatment procedures include primary Taxol/carbo with retreatment in  and Doxil/Avastin, topotecan, gemzar and now single agent weekly Taxol initiated 18 following progression with chest wall involvement. In 2018, patient did not follow up and self-discontinued her treatment regimen. She presented to the ER on 18 with SOB. Negative workup for PE or MI. Admitted to Hospitalist service from -18. Managed for acute asthma exacerbation, acute FELICIA, and hyperkalemia. The patient was initiated on Javier Hunting on 19. Underwent repeat CT C/A/P on 19 for purposes of following response to treatment. Patient was tolerating Javier Hunting well for the first month, but then had a rise in her creatinine. However, she was also found to have hydronephrosis. Underwent right ureteral stent placement on 3/18/19. Restarted Zejula afterwards, although with continued rise in creatinine to >2. Denies current shortness of breath. Does report some pain in her chest where the mass is located. Denies cough or hemoptysis. Denies nausea, vomiting, constipation or diarrhea. Had some constipation when she started Javier Hunting, but this has resolved with stool softeners. Denies fevers or chills. Does report that her appetite is a little less. Restarted Zejula at 100mg/daily on 19. Presents today for follow-up. Tolerating treatment well. Labs appropriate.       OncTx History:  12: MARAH/BSO/Cytoreductive surgery on 12 noted carcinomatosis/omental caking.    10/2012-2013: 6 cycles Carbo/Taxol  8/2014-11/2014: 6 Cycles carbo/Taxol  3/2015-5/13/15: 3 cycles Avastin/Doxil until progression  7/2015-8/2015 Weekly Topotecan   12/2015-3/2016: Weekly Topotecan  2/2017-9/2017:  Gemzar D1/D8 Q28 days for 6 cycles  4/2018: CT core needle bx chest wall mass: Metastatic high-grade serous carcinoma (see comment)   General Categorization   Positive for malignancy. 5/8/2018-Present: Taxol  G9,6,57; S/P  Cycle #4  8/14/2018. Patient discontinued treatment secondary to side effects and fatigue. 1/23/2019 - Initiated Euell Hawking. Held do to rising creatinine. However, she was also found to have hydronephrosis. Underwent right ureteral stent placement on 3/18/19. Imaging Review:   CT C/A/P 2/5/19:  FINDINGS:   THYROID: No nodule. MEDIASTINUM: Mediastinal lymphadenopathy is unchanged. A reference superior  mediastinal lymph node is stable measuring 3.3 cm. SWATI: No mass or lymphadenopathy. THORACIC AORTA: No dissection or aneurysm. MAIN PULMONARY ARTERY: Normal in caliber. TRACHEA/BRONCHI: Patent. ESOPHAGUS: No wall thickening or dilatation. HEART: Normal in size. PLEURA: No effusion or pneumothorax. LUNGS: No nodule, mass, or airspace disease. LIVER: Evaluation of the liver is limited by lack of intravenous contrast. There  is a vague 4.4 cm hypodense lesion in the left hepatic lobe previously measuring  3.0 cm. Lesion in the right hepatic lobe now measures 3.3 cm, previously  measuring 2.7 cm. GALLBLADDER: Unremarkable. SPLEEN: Central soft tissue abnormality has increased in size, now measuring 4.9  cm, previously measuring 3.1 cm. PANCREAS: Not well evaluated given the massive lymphadenopathy and lack of  intravenous contrast.  ADRENALS: Unremarkable. KIDNEYS: There is no moderate right hydronephrosis. STOMACH: Unremarkable. SMALL BOWEL: No dilatation or wall thickening. COLON: No dilatation or wall thickening. APPENDIX: Not visualized.   PERITONEUM: Bulky gastrohepatic, periceliac, portacaval, and mesenteric  lymphadenopathy has increased. There is secondary invasion of the liver. Maximum  dimension in the upper abdomen is currently 10.4 cm, previously 9.3 cm. Elemental soft tissue nodules have increased in size, with a reference 7.0 cm  nodule in the left abdomen previously measuring 5.5 cm. Soft tissue nodule in  the deep pelvis between the bladder and colon has increased in size as well. RETROPERITONEUM: No lymphadenopathy or aortic aneurysm. REPRODUCTIVE ORGANS: The uterus and ovaries are surgically absent. URINARY BLADDER: No mass or calculus. BONES: Soft tissue mass involving the right sternum has not changed. Degenerative changes are seen in the thoracic and lumbar spine. ADDITIONAL COMMENTS: N/A  IMPRESSION  IMPRESSION:  Stable mediastinal lymphadenopathy. Progressive abdominal lymphadenopathy as  described above. Liver lesions have increased in size compared to the prior  exam. Stable right sternal mass. Moderate right hydroureteronephrosis is now  noted which appears to be related to the lymphadenopathy. ROS  Constitutional: no weight loss, fever, night sweats  Respiratory: no cough, shortness of breath, or wheezing  Cardiovascular: no chest pain or dyspnea on exertion. Reports chest wall mass smaller, no associated pain. Heme: No abnormal bleeding  Gastrointestinal: no abdominal pain, change in bowel habits, or black or bloody stools  Genito-Urinary: no dysuria, trouble voiding, or hematuria  Musculoskeletal: + swelling in ankle - bilateral, mostly only at end of day  Neurological: mild neuropathy  Derm: pigmentation/induration medial left leg. Prior injury from fall. Psych: positive for depression - controlled        PMH:  Past Medical History:   Diagnosis Date    Adverse effect of anesthesia     sleep apnea cpap machine useage     Anemia     Arthritis     DDD low back and knees    Asthma     uses albuterol prn only; none x 6 mos.   Never hospitalized    BRCA negative 2013    Calculus of kidney     Chronic kidney disease     kidney stones    Chronic pain     knee pain bilat    CINV (chemotherapy-induced nausea and vomiting) 2014    Decreased hearing, right     PATIENT STATES THIS IS DUE TO CHEMOTHERAPY    Diabetes ([address] Age 46    IDDM    Environmental allergies     Fibromyalgia     GERD (gastroesophageal reflux disease)     controlled with med    Hx of pulmonary embolus during pregnancy 2015    Hydronephrosis due to obstruction of ureter 3/18/2019    Hypertension     Ill-defined condition     Sepsis  -  SOURCE PORT    Ill-defined condition 2016    chemotherapy     Mass of chest wall 2018    Morbid obesity (HCC)     Murmur, heart     Other and unspecified hyperlipidemia     Ovarian cancer ([address] 2012, 2014    serous, high grade, stage IIIc. s/p chemotx (has port)    Psychiatric disorder     Depression/Anxiety    Rectal bleeding     Thromboembolus ([address]     POST PARTUM; resolved- PELVIS    Thyroid disease     HYPOTHYROID    Unspecified sleep apnea     CPAP, Dr. Judith Tinajero     PSH:  Past Surgical History:   Procedure Laterality Date    BREAST SURGERY PROCEDURE UNLISTED      Lymph node in left breast 2017   [place]     X2    HX HYSTERECTOMY  2012    ROULA/BSO, tumor debulking, omentectomy for ovarian cancer    HX ORTHOPAEDIC Right     ankle-FX, R    HX ORTHOPAEDIC Right     FX R ARM    HX UROLOGICAL Right     STENT FOR KIDNEY STONES    HX VASCULAR ACCESS  2015    right chest- port placed    HX VASCULAR ACCESS Left 2017    TETE CATH     SOC:  Social History     Socioeconomic History    Marital status:      Spouse name: Not on file    Number of children: Not on file    Years of education: Not on file    Highest education level: Not on file   Occupational History    Not on file   Social Needs    Financial resource strain: Not on file    Food insecurity:     Worry: Not on file     Inability: Not on file    Transportation needs:     Medical: Not on file     Non-medical: Not on file   Tobacco Use    Smoking status: Never Smoker    Smokeless tobacco: Never Used   Substance and Sexual Activity    Alcohol use: Yes     Comment: 1 drink per month    Drug use: No    Sexual activity: Yes   Lifestyle    Physical activity:     Days per week: Not on file     Minutes per session: Not on file    Stress: Not on file   Relationships    Social connections:     Talks on phone: Not on file     Gets together: Not on file     Attends Bahai service: Not on file     Active member of club or organization: Not on file     Attends meetings of clubs or organizations: Not on file     Relationship status: Not on file    Intimate partner violence:     Fear of current or ex partner: Not on file     Emotionally abused: Not on file     Physically abused: Not on file     Forced sexual activity: Not on file   Other Topics Concern    Not on file   Social History Narrative    Lives in [place].  passed away in 5/16 of a heart attack. Has 2 sons. Disability. 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 Enjoys swimming and going to the gym.      Family History  Family History   Problem Relation Age of Onset    Hypertension Mother     Arthritis-osteo Mother     Hypertension Father     Arthritis-osteo Father     Cancer Father         PROSTATE    COPD Father     Hypertension Brother     Elevated Lipids Brother     Arthritis-osteo Brother     Hypertension Brother     Elevated Lipids Brother     Obesity Brother     Cancer Brother         PROSTATE    Anesth Problems Son         DELAYED AWAKENING    Diabetes Maternal Grandmother     Anesth Problems Paternal Grandmother         [address] STOPPED DURING SURGERY     Medications: (reviewed)  Current Outpatient Medications on File Prior to Visit   Medication Sig Dispense Refill    omega-3 acid ethyl esters (LOVAZA) 1 gram capsule TAKE 1 CAPSULE BY MOUTH TWICE A  Cap 2    LINZESS 145 mcg cap capsule TAKE 1 CAPSULE BY MOUTH EVERY DAYY 30 Cap 0    levothyroxine (SYNTHROID) 150 mcg tablet TAKE 1 TABLET BY MOUTH EVERY DAY BEFORE BREAKFAST 90 Tab 0    ZEJULA 100 mg cap Take 100 mg by mouth two (2) times a day.  glucose blood VI test strips (TRUE METRIX GLUCOSE TEST STRIP) strip by Does Not Apply route See Admin Instructions. 30 Strip 5    lancets misc by Does Not Apply route daily. True Metrix lancets- test blood sugar once per day 30 Each 5    Blood-Glucose Meter monitoring kit Check up to tid, as directed 1 Kit 0    insulin detemir U-100 (LEVEMIR) 100 unit/mL injection 20 Units by SubCUTAneous route daily (with lunch).  ascorbic acid, vitamin C, (VITAMIN C) 1,000 mg tablet Take 1,000 mg by mouth daily.  potassium chloride (KLOR-CON M10) 10 mEq tablet TAKE 1 TAB BY MOUTH TWO (2) DAYS A WEEK. WED AND FRI 90 Tab 1    telmisartan (MICARDIS) 20 mg tablet Take 1 Tab by mouth daily. 90 Tab 3    insulin aspart U-100 (NOVOLOG FLEXPEN U-100 INSULIN) 100 unit/mL inpn INJECT 20 UNITS BEFORE EACH MEAL + 4 UNITS FOR EVERY 50 MG/DL ABOVE 150 MG/DL.  UNITS PER DAY 30 mL 11    Dexlansoprazole (DEXILANT) 60 mg CpDB Take 1 Cap by mouth daily. 90 Cap 3    Liraglutide (VICTOZA) 0.6 mg/0.1 mL (18 mg/3 mL) pnij 1.8 mg by SubCUTAneous route daily (with lunch). 3 Pen 3    SYMBICORT 160-4.5 mcg/actuation HFAA Take 2 Puffs by inhalation two (2) times daily as needed. 2 Inhaler 3    clonazePAM (KLONOPIN) 0.5 mg tablet Take 0.5 mg by mouth nightly as needed.  sertraline (ZOLOFT) 50 mg tablet TAKE 1 TABLET BY MOUTH as needed  1    cholecalciferol, VITAMIN D3, (VITAMIN D3) 5,000 unit tab tablet Take 5,000 Units by mouth daily.  furosemide (LASIX) 40 mg tablet Take 40 mg by mouth every other day.   3    fluticasone propionate (FLONASE) 50 mcg/actuation nasal spray 2 SPRAYS IN BOTH NOSTRILS DAILY 32 g 3    ondansetron (ZOFRAN ODT) 4 mg disintegrating tablet Take 1 Tab by mouth every eight (8) hours as needed for Nausea. Indications: Nausea and Vomiting caused by Cancer Drugs 30 Tab 2    EPINEPHrine (EPIPEN) 0.3 mg/0.3 mL injection INJECT 0.3 ML BY INTRAMUSCULAR ROUTE ONCE AS NEEDED FOR UP TO 1 DOSE.  1    fluticasone propionate (FLONASE) 50 mcg/actuation nasal spray       amoxicillin (AMOXIL) 500 mg capsule Prior to dental procedures      oxyCODONE IR (ROXICODONE) 5 mg immediate release tablet Take 1 Tab by mouth every four (4) hours as needed for Pain. Max Daily Amount: 30 mg. 45 Tab 0    azelastine (ASTELIN) 137 mcg (0.1 %) nasal spray 1 [place] by Both Nostrils route daily as needed. Use in each nostril as directed       montelukast (SINGULAIR) 10 mg tablet Take 10 mg by mouth nightly.  albuterol (PROVENTIL VENTOLIN) 2.5 mg /3 mL (0.083 %) nebulizer solution 2.5 mg by Nebulization route every four (4) hours as needed for Wheezing. No current facility-administered medications on file prior to visit. Allergies: (reviewed)  Allergies   Allergen Reactions    Carboplatin Other (comments)     Patient developed shortness of breath, felt faint, coughing, skin flushed and \"itchy\". This occurred on the patients 8th treatment.  Iodinated Contrast- Oral And Iv Dye Rash     13 hr pre-medication prior to IV contrast.   Patient has done well with 1 hr pre-medication of (Solu-Medrol) 40mg &  (Benadryl) 50mg.     Lipitor [Atorvastatin] Myalgia    Shellfish Containing Products Hives    Tape [Adhesive] Itching and Other (comments)     \"op site-- clear,thin tape\"--caused blister     Tomato Hives    Olmesartan Other (comments)    Losartan Other (comments)     headaches    Percocet [Oxycodone-Acetaminophen] Other (comments)     Fever; however, current home med       OBJECTIVE  Physical Exam  Visit Vitals  /81 (BP 1 Location: Left arm, BP Patient Position: Sitting)   Pulse 86   Ht 5' 9\" (1.753 m)   Wt 290 lb 6.4 oz (131.7 kg)   LMP 09/07/2011   BMI 42.88 kg/m²     Physical Exam:  General: Alert and oriented. No acute distress. Well-nourished  HEENT: No thyroid enlargment. Neck supple without restrictions. Sclera normal. Normal occular motion. Moist mucous membranes. Lymphatics: No evidence of axillary, cervical, or subclavicular adenopathy. Respiratory: clear to auscultation and percussion to the bases. No CVAT. No discernable chest wall mass on palpation, but clear on imaging. Cardiovascular: regular rate and rhythm. No murmurs, rubs, or gallops. Gastrointestinal: soft, non-tender, non-distended, no masses or organomegaly. Well-healed incision. Musculoskeletal: normal gait. No joint tenderness, deformity or swelling. No muscular tenderness. Extremities: extremities normal, atraumatic, mild 1+ edema bilaterally. Pelvic: deferred today  Neuro: Grossly intact. Normal gait and movement. No acute deficit  Skin: No evidence of rashes or skin changes. DATE REVIEW as available:  Lab Results   Component Value Date/Time    WBC 7.3 05/16/2019 02:49 PM    Hemoglobin (POC) 10.5 (L) 03/20/2017 12:15 PM    HGB 7.9 (L) 05/16/2019 02:49 PM    Hematocrit (POC) 31 (L) 03/20/2017 12:15 PM    HCT 26.2 (L) 05/16/2019 02:49 PM    PLATELET 582 88/57/4163 02:49 PM    MCV 98.5 05/16/2019 02:49 PM     Lab Results   Component Value Date/Time    ABS.  NEUTROPHILS 4.5 05/16/2019 02:49 PM     Lab Results   Component Value Date/Time    Sodium 139 05/16/2019 02:49 PM    Potassium 4.6 05/16/2019 02:49 PM    Chloride 109 (H) 05/16/2019 02:49 PM    CO2 23 05/16/2019 02:49 PM    Anion gap 7 05/16/2019 02:49 PM    Glucose 134 (H) 05/16/2019 02:49 PM    BUN 30 (H) 05/16/2019 02:49 PM    Creatinine 1.58 (H) 05/16/2019 02:49 PM    BUN/Creatinine ratio 19 05/16/2019 02:49 PM    GFR est AA 41 (L) 05/16/2019 02:49 PM    GFR est non-AA 34 (L) 05/16/2019 02:49 PM    Calcium 9.2 05/16/2019 02:49 PM    Bilirubin, total 0.3 05/16/2019 02:49 PM    AST (SGOT) 39 (H) 05/16/2019 02:49 PM    Alk. phosphatase 86 05/16/2019 02:49 PM    Protein, total 8.4 (H) 05/16/2019 02:49 PM    Albumin 3.2 (L) 05/16/2019 02:49 PM    Globulin 5.2 (H) 05/16/2019 02:49 PM    A-G Ratio 0.6 (L) 05/16/2019 02:49 PM    ALT (SGPT) 26 05/16/2019 02:49 PM       ECHO 7/17/18  Left ventricle: Systolic function was normal. Ejection fraction was estimated in the range of 55 % to 60 %. There were no regional wall motion  abnormalities. Wall thickness was mildly increased. Left atrium: The atrium was mildly dilated. Mitral valve: There was mild regurgitation. Aortic valve: Leaflets exhibited sclerosis without stenosis. Not well visualized. IMPRESSION AND PLAN:  Ms. Angle Pride is a 63 y.o. female with recurrent, metastatic ovarian cancer. Heavily pre-treated. Lost to follow-up since 9/2018, at which time patient self-discontinued weekly paclitaxel. Initiated Christron Carbajalre on 1/23/19. Held after 1 month secondary to rising creatinine. Found to have right hydronephrosis. Right ureteral stent placed 3/18/19 with normalization of creatinine. ECOG 1    Problem List Items Addressed This Visit        Endocrine    Type 2 diabetes mellitus with nephropathy (HCC)       Immune    Lymphadenopathy, mediastinal       Reproductive    Ovarian cancer ([facility] [address] - Primary       Other    Chest mass    Chest wall mass    On antineoplastic chemotherapy          Reviewed patient's course to date. Tolerating Zejula 100mg/daily. Creatinine stable. Ca-125 decreasing. May consider increasing dose back up if her creatinine remains stable this time. While the patient is BRCA negative, there is some evidence that PARPi may provide some benefit to BRCA wild-type patients. Previously I discussed goals of care, including that any type of treatment at this time is palliative in nature.  I have reviewed her chemotherapy history, which is extensive; although the patient has been somewhat non-compliant with treatments with missed treatments \"here and there\". Stressed importance of remaining on treatment. Discussed that palliative therapy at this time has a response rate at best of 5-10% regardless of the type of treatment. Counseled patient regarding code status and advance care directives, which the patient is going to consider. She reports she has a \"living will\", but I am unsure as to her wishes regarding code status. Will plan to continue this dialogue at future visits. All questions and concerns were addressed with the patient and she is comfortable with the plan.     Brian Brennan MD

## 2019-05-23 ENCOUNTER — TELEPHONE (OUTPATIENT)
Dept: GYNECOLOGY | Age: 59
End: 2019-05-23

## 2019-05-24 ENCOUNTER — TELEPHONE (OUTPATIENT)
Dept: GYNECOLOGY | Age: 59
End: 2019-05-24

## 2019-05-24 LAB
ALBUMIN SERPL-MCNC: 4.1 G/DL (ref 3.5–5.5)
ALBUMIN/GLOB SERPL: 1 {RATIO} (ref 1.2–2.2)
ALP SERPL-CCNC: 90 IU/L (ref 39–117)
ALT SERPL-CCNC: 17 IU/L (ref 0–32)
AST SERPL-CCNC: 35 IU/L (ref 0–40)
BASOPHILS # BLD AUTO: 0.1 X10E3/UL (ref 0–0.2)
BASOPHILS NFR BLD AUTO: 1 %
BILIRUB SERPL-MCNC: 0.3 MG/DL (ref 0–1.2)
BUN SERPL-MCNC: 25 MG/DL (ref 6–24)
BUN/CREAT SERPL: 15 (ref 9–23)
CALCIUM SERPL-MCNC: 10.1 MG/DL (ref 8.7–10.2)
CHLORIDE SERPL-SCNC: 104 MMOL/L (ref 96–106)
CO2 SERPL-SCNC: 18 MMOL/L (ref 20–29)
CREAT SERPL-MCNC: 1.66 MG/DL (ref 0.57–1)
EOSINOPHIL # BLD AUTO: 0.5 X10E3/UL (ref 0–0.4)
EOSINOPHIL NFR BLD AUTO: 6 %
ERYTHROCYTE [DISTWIDTH] IN BLOOD BY AUTOMATED COUNT: 15.4 % (ref 12.3–15.4)
GLOBULIN SER CALC-MCNC: 4.2 G/DL (ref 1.5–4.5)
GLUCOSE SERPL-MCNC: 108 MG/DL (ref 65–99)
HCT VFR BLD AUTO: 27.1 % (ref 34–46.6)
HGB BLD-MCNC: 8.7 G/DL (ref 11.1–15.9)
IMM GRANULOCYTES # BLD AUTO: 0 X10E3/UL (ref 0–0.1)
IMM GRANULOCYTES NFR BLD AUTO: 0 %
LYMPHOCYTES # BLD AUTO: 2.4 X10E3/UL (ref 0.7–3.1)
LYMPHOCYTES NFR BLD AUTO: 27 %
MAGNESIUM SERPL-MCNC: 1.6 MG/DL (ref 1.6–2.3)
MCH RBC QN AUTO: 29.1 PG (ref 26.6–33)
MCHC RBC AUTO-ENTMCNC: 32.1 G/DL (ref 31.5–35.7)
MCV RBC AUTO: 91 FL (ref 79–97)
MONOCYTES # BLD AUTO: 0.7 X10E3/UL (ref 0.1–0.9)
MONOCYTES NFR BLD AUTO: 8 %
NEUTROPHILS # BLD AUTO: 5.2 X10E3/UL (ref 1.4–7)
NEUTROPHILS NFR BLD AUTO: 58 %
PLATELET # BLD AUTO: 201 X10E3/UL (ref 150–450)
POTASSIUM SERPL-SCNC: 5.3 MMOL/L (ref 3.5–5.2)
PROT SERPL-MCNC: 8.3 G/DL (ref 6–8.5)
RBC # BLD AUTO: 2.99 X10E6/UL (ref 3.77–5.28)
SODIUM SERPL-SCNC: 137 MMOL/L (ref 134–144)
WBC # BLD AUTO: 8.9 X10E3/UL (ref 3.4–10.8)

## 2019-05-24 NOTE — TELEPHONE ENCOUNTER
Dr. Dony Olivas reviewed patient's labs, I called and per Dr. Dony Olivas, labs look okay, potassium is high, she is takiing a potassium supplement and she was advised to stop per Dr. Dony Olivas, also was advised to hydrate. Pt verbalized understanding. To have labs repeated next week.   Juana to touch base with patient on Wednesday

## 2019-05-29 NOTE — TELEPHONE ENCOUNTER
Per Dr. Filiberto Lyn pt informed to continue Zejula 100mg daily and have labs repeated 6/610, or sooner if she experiences any problems.

## 2019-06-13 ENCOUNTER — HOSPITAL ENCOUNTER (OUTPATIENT)
Dept: INFUSION THERAPY | Age: 59
Discharge: HOME | End: 2019-06-13
Payer: MEDICARE

## 2019-06-13 VITALS
HEART RATE: 94 BPM | DIASTOLIC BLOOD PRESSURE: 85 MMHG | RESPIRATION RATE: 18 BRPM | SYSTOLIC BLOOD PRESSURE: 131 MMHG | TEMPERATURE: 97.9 F

## 2019-06-13 DIAGNOSIS — C56.9 MALIGNANT NEOPLASM OF OVARY, UNSPECIFIED LATERALITY (HCC): Primary | ICD-10-CM

## 2019-06-13 LAB
ALBUMIN SERPL-MCNC: 3.4 G/DL (ref 3.5–5)
ALBUMIN/GLOB SERPL: 0.6 {RATIO} (ref 1.1–2.2)
ALP SERPL-CCNC: 104 U/L (ref 45–117)
ALT SERPL-CCNC: 26 U/L (ref 12–78)
ANION GAP SERPL CALC-SCNC: 7 MMOL/L (ref 5–15)
AST SERPL-CCNC: 35 U/L (ref 15–37)
BASOPHILS # BLD: 0.1 K/UL (ref 0–0.1)
BASOPHILS NFR BLD: 1 % (ref 0–1)
BILIRUB SERPL-MCNC: 0.3 MG/DL (ref 0.2–1)
BUN SERPL-MCNC: 36 MG/DL (ref 6–20)
BUN/CREAT SERPL: 20 (ref 12–20)
CALCIUM SERPL-MCNC: 9.3 MG/DL (ref 8.5–10.1)
CANCER AG125 SERPL-ACNC: 183 U/ML (ref 1.5–35)
CHLORIDE SERPL-SCNC: 110 MMOL/L (ref 97–108)
CO2 SERPL-SCNC: 22 MMOL/L (ref 21–32)
CREAT SERPL-MCNC: 1.76 MG/DL (ref 0.55–1.02)
DIFFERENTIAL METHOD BLD: ABNORMAL
EOSINOPHIL # BLD: 0.4 K/UL (ref 0–0.4)
EOSINOPHIL NFR BLD: 7 % (ref 0–7)
ERYTHROCYTE [DISTWIDTH] IN BLOOD BY AUTOMATED COUNT: 14.3 % (ref 11.5–14.5)
GLOBULIN SER CALC-MCNC: 5.5 G/DL (ref 2–4)
GLUCOSE SERPL-MCNC: 110 MG/DL (ref 65–100)
HCT VFR BLD AUTO: 26.5 % (ref 35–47)
HGB BLD-MCNC: 8 G/DL (ref 11.5–16)
IMM GRANULOCYTES # BLD AUTO: 0 K/UL (ref 0–0.04)
IMM GRANULOCYTES NFR BLD AUTO: 0 % (ref 0–0.5)
LYMPHOCYTES # BLD: 1.8 K/UL (ref 0.8–3.5)
LYMPHOCYTES NFR BLD: 26 % (ref 12–49)
MCH RBC QN AUTO: 29.7 PG (ref 26–34)
MCHC RBC AUTO-ENTMCNC: 30.2 G/DL (ref 30–36.5)
MCV RBC AUTO: 98.5 FL (ref 80–99)
MONOCYTES # BLD: 0.6 K/UL (ref 0–1)
MONOCYTES NFR BLD: 9 % (ref 5–13)
NEUTS SEG # BLD: 3.8 K/UL (ref 1.8–8)
NEUTS SEG NFR BLD: 57 % (ref 32–75)
NRBC # BLD: 0 K/UL (ref 0–0.01)
NRBC BLD-RTO: 0 PER 100 WBC
PLATELET # BLD AUTO: 185 K/UL (ref 150–400)
PMV BLD AUTO: 9.3 FL (ref 8.9–12.9)
POTASSIUM SERPL-SCNC: 4.9 MMOL/L (ref 3.5–5.1)
PROT SERPL-MCNC: 8.9 G/DL (ref 6.4–8.2)
RBC # BLD AUTO: 2.69 M/UL (ref 3.8–5.2)
SODIUM SERPL-SCNC: 139 MMOL/L (ref 136–145)
WBC # BLD AUTO: 6.7 K/UL (ref 3.6–11)

## 2019-06-13 PROCEDURE — 77030012965 HC NDL HUBR BBMI -A

## 2019-06-13 PROCEDURE — 85025 COMPLETE CBC W/AUTO DIFF WBC: CPT

## 2019-06-13 PROCEDURE — 86304 IMMUNOASSAY TUMOR CA 125: CPT

## 2019-06-13 PROCEDURE — 36415 COLL VENOUS BLD VENIPUNCTURE: CPT

## 2019-06-13 PROCEDURE — 36591 DRAW BLOOD OFF VENOUS DEVICE: CPT

## 2019-06-13 PROCEDURE — 80053 COMPREHEN METABOLIC PANEL: CPT

## 2019-06-13 PROCEDURE — 74011250636 HC RX REV CODE- 250/636: Performed by: OBSTETRICS & GYNECOLOGY

## 2019-06-13 RX ORDER — SODIUM CHLORIDE 9 MG/ML
10 INJECTION INTRAMUSCULAR; INTRAVENOUS; SUBCUTANEOUS AS NEEDED
Status: DISCONTINUED | OUTPATIENT
Start: 2019-06-13 | End: 2019-06-14 | Stop reason: HOSPADM

## 2019-06-13 RX ORDER — SODIUM CHLORIDE 9 MG/ML
10 INJECTION INTRAMUSCULAR; INTRAVENOUS; SUBCUTANEOUS AS NEEDED
Status: CANCELLED | OUTPATIENT
Start: 2019-06-13

## 2019-06-13 RX ORDER — SODIUM CHLORIDE 0.9 % (FLUSH) 0.9 %
10-40 SYRINGE (ML) INJECTION AS NEEDED
Status: CANCELLED | OUTPATIENT
Start: 2019-06-13 | End: 2019-06-14

## 2019-06-13 RX ORDER — HEPARIN 100 UNIT/ML
500 SYRINGE INTRAVENOUS AS NEEDED
Status: CANCELLED | OUTPATIENT
Start: 2019-06-13

## 2019-06-13 RX ORDER — SODIUM CHLORIDE 9 MG/ML
10 INJECTION INTRAMUSCULAR; INTRAVENOUS; SUBCUTANEOUS AS NEEDED
Status: CANCELLED | OUTPATIENT
Start: 2019-06-13 | End: 2019-06-14

## 2019-06-13 RX ORDER — SODIUM CHLORIDE 0.9 % (FLUSH) 0.9 %
5-10 SYRINGE (ML) INJECTION AS NEEDED
Status: DISCONTINUED | OUTPATIENT
Start: 2019-06-13 | End: 2019-06-14 | Stop reason: HOSPADM

## 2019-06-13 RX ORDER — SODIUM CHLORIDE 0.9 % (FLUSH) 0.9 %
5-10 SYRINGE (ML) INJECTION AS NEEDED
Status: CANCELLED | OUTPATIENT
Start: 2019-06-13

## 2019-06-13 RX ORDER — HEPARIN 100 UNIT/ML
500 SYRINGE INTRAVENOUS AS NEEDED
Status: DISCONTINUED | OUTPATIENT
Start: 2019-06-13 | End: 2019-06-14 | Stop reason: HOSPADM

## 2019-06-13 RX ORDER — HEPARIN 100 UNIT/ML
500 SYRINGE INTRAVENOUS AS NEEDED
Status: CANCELLED | OUTPATIENT
Start: 2019-06-13 | End: 2019-06-14

## 2019-06-13 RX ADMIN — Medication 500 UNITS: at 15:20

## 2019-06-13 RX ADMIN — Medication 10 ML: at 15:20

## 2019-06-13 RX ADMIN — SODIUM CHLORIDE 10 ML: 9 INJECTION INTRAMUSCULAR; INTRAVENOUS; SUBCUTANEOUS at 15:20

## 2019-06-13 NOTE — PROGRESS NOTES
[facility]patient [facility]fusion [facility] Short Visit Progress Note    4368 Pt admit to [facility] for port flush and labs ambulatory in stable condition. Assessment completed. No new concerns voiced. Patient Vitals for the past 12 hrs:   Temp Pulse Resp BP   06/13/19 1511 97.9 °F (36.6 °C) 94 18 131/85       Port accessed with positive blood return. Lab drawn, flushed, heparinized and de-accessed per protocol. Medications:  NS flushes  Heparin     1520 Pt tolerated treatment well. D/c home ambulatory in no distress. Pt aware of next appointment scheduled for 7/11/19. Recent Results (from the past 12 hour(s))   CBC WITH AUTOMATED DIFF    Collection Time: 06/13/19  3:20 PM   Result Value Ref Range    WBC 6.7 3.6 - 11.0 K/uL    RBC 2.69 (L) 3.80 - 5.20 M/uL    HGB 8.0 (L) 11.5 - 16.0 g/dL    HCT 26.5 (L) 35.0 - 47.0 %    MCV 98.5 80.0 - 99.0 FL    MCH 29.7 26.0 - 34.0 PG    MCHC 30.2 30.0 - 36.5 g/dL    RDW 14.3 11.5 - 14.5 %    PLATELET 042 466 - 008 K/uL    MPV 9.3 8.9 - 12.9 FL    NRBC 0.0 0  WBC    ABSOLUTE NRBC 0.00 0.00 - 0.01 K/uL    NEUTROPHILS 57 32 - 75 %    LYMPHOCYTES 26 12 - 49 %    MONOCYTES 9 5 - 13 %    EOSINOPHILS 7 0 - 7 %    BASOPHILS 1 0 - 1 %    IMMATURE GRANULOCYTES 0 0.0 - 0.5 %    ABS. NEUTROPHILS 3.8 1.8 - 8.0 K/UL    ABS. LYMPHOCYTES 1.8 0.8 - 3.5 K/UL    ABS. MONOCYTES 0.6 0.0 - 1.0 K/UL    ABS. EOSINOPHILS 0.4 0.0 - 0.4 K/UL    ABS. BASOPHILS 0.1 0.0 - 0.1 K/UL    ABS. IMM.  GRANS. 0.0 0.00 - 0.04 K/UL    DF AUTOMATED     METABOLIC PANEL, COMPREHENSIVE    Collection Time: 06/13/19  3:20 PM   Result Value Ref Range    Sodium 139 136 - 145 mmol/L    Potassium 4.9 3.5 - 5.1 mmol/L    Chloride 110 (H) 97 - 108 mmol/L    CO2 22 21 - 32 mmol/L    Anion gap 7 5 - 15 mmol/L    Glucose 110 (H) 65 - 100 mg/dL    BUN 36 (H) 6 - 20 MG/DL    Creatinine 1.76 (H) 0.55 - 1.02 MG/DL    BUN/Creatinine ratio 20 12 - 20      GFR est AA 36 (L) >60 ml/min/1.73m2    GFR est non-AA 30 (L) >60 ml/min/1.73m2 Calcium 9.3 8.5 - 10.1 MG/DL    Bilirubin, total 0.3 0.2 - 1.0 MG/DL    ALT (SGPT) 26 12 - 78 U/L    AST (SGOT) 35 15 - 37 U/L    Alk.  phosphatase 104 45 - 117 U/L    Protein, total 8.9 (H) 6.4 - 8.2 g/dL    Albumin 3.4 (L) 3.5 - 5.0 g/dL    Globulin 5.5 (H) 2.0 - 4.0 g/dL    A-G Ratio 0.6 (L) 1.1 - 2.2     CANCER ANTIGEN 125    Collection Time: 06/13/19  3:20 PM   Result Value Ref Range    CA-125 183 (H) 1.5 - 35.0 U/mL

## 2019-06-17 NOTE — PROGRESS NOTES
[address] [facility] [address], [address] [facility]  P [phone]  F [phone]    Office Visit    Patient ID:  Name: Kait Brennan  MRN: [id number]  : 1960/58 y.o. Visit date: 2019    LEONID Conley is a 63 y.o.  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 The patient's previous treatment procedures include primary Taxol/carbo with retreatment in  and Doxil/Avastin, topotecan, gemzar and now single agent weekly Taxol initiated 18 following progression with chest wall involvement. In 2018, patient did not follow up and self-discontinued her treatment regimen. She presented to the ER on 18 with SOB. Negative workup for PE or MI. Admitted to Hospitalist service from -18. Managed for acute asthma exacerbation, acute FELICIA, and hyperkalemia. The patient was initiated on Ksuum Math on 19. Underwent repeat CT C/A/P on 19 for purposes of following response to treatment. Patient was tolerating Kusum Math well for the first month, but then had a rise in her creatinine. However, she was also found to have hydronephrosis. Underwent right ureteral stent placement on 3/18/19. Restarted Zejula afterwards, although with continued rise in creatinine to >2. Denies current shortness of breath. Does report some pain in her chest where the mass is located. Denies cough or hemoptysis. Denies nausea, vomiting, constipation or diarrhea. Had some constipation when she started Kusum Math, but this has resolved with stool softeners. Denies fevers or chills. Does report that her appetite is a little less. Restarted Zejula at 100mg/daily on 19. Presents today for follow-up. Tolerating treatment well. Labs appropriate. Reports some increased pain at her chest wall mass. Otherwise the patient is really without complaints.  Denies change in appetite or bowel habits. Denies vaginal bleeding, hematuria, hematochezia, constipation, diarrhea, nausea, vomiting, CP, SOB, fevers, or chills.       OncTx History:  9/21/12: MARAH/BSO/Cytoreductive surgery on 9/21/12 noted carcinomatosis/omental caking. 10/2012-2/2013: 6 cycles Carbo/Taxol  8/2014-11/2014: 6 Cycles carbo/Taxol  3/2015-5/13/15: 3 cycles Avastin/Doxil until progression  7/2015-8/2015 Weekly Topotecan   12/2015-3/2016: Weekly Topotecan  2/2017-9/2017:  Gemzar D1/D8 Q28 days for 6 cycles  4/2018: CT core needle bx chest wall mass: Metastatic high-grade serous carcinoma (see comment)   General Categorization   Positive for malignancy. 5/8/2018-Present: Taxol  A8,0,46; S/P  Cycle #4  8/14/2018. Patient discontinued treatment secondary to side effects and fatigue. 1/23/2019 - Initiated Lubna Cook. Held do to rising creatinine. However, she was also found to have hydronephrosis. Underwent right ureteral stent placement on 3/18/19. Imaging Review:   CT C/A/P 2/5/19:  FINDINGS:   THYROID: No nodule. MEDIASTINUM: Mediastinal lymphadenopathy is unchanged. A reference superior  mediastinal lymph node is stable measuring 3.3 cm. SWATI: No mass or lymphadenopathy. THORACIC AORTA: No dissection or aneurysm. MAIN PULMONARY ARTERY: Normal in caliber. TRACHEA/BRONCHI: Patent. ESOPHAGUS: No wall thickening or dilatation. HEART: Normal in size. PLEURA: No effusion or pneumothorax. LUNGS: No nodule, mass, or airspace disease. LIVER: Evaluation of the liver is limited by lack of intravenous contrast. There  is a vague 4.4 cm hypodense lesion in the left hepatic lobe previously measuring  3.0 cm. Lesion in the right hepatic lobe now measures 3.3 cm, previously  measuring 2.7 cm. GALLBLADDER: Unremarkable. SPLEEN: Central soft tissue abnormality has increased in size, now measuring 4.9  cm, previously measuring 3.1 cm.   PANCREAS: Not well evaluated given the massive lymphadenopathy and lack of  intravenous contrast.  ADRENALS: Unremarkable. KIDNEYS: There is no moderate right hydronephrosis. STOMACH: Unremarkable. SMALL BOWEL: No dilatation or wall thickening. COLON: No dilatation or wall thickening. APPENDIX: Not visualized. PERITONEUM: Bulky gastrohepatic, periceliac, portacaval, and mesenteric  lymphadenopathy has increased. There is secondary invasion of the liver. Maximum  dimension in the upper abdomen is currently 10.4 cm, previously 9.3 cm. Elemental soft tissue nodules have increased in size, with a reference 7.0 cm  nodule in the left abdomen previously measuring 5.5 cm. Soft tissue nodule in  the deep pelvis between the bladder and colon has increased in size as well. RETROPERITONEUM: No lymphadenopathy or aortic aneurysm. REPRODUCTIVE ORGANS: The uterus and ovaries are surgically absent. URINARY BLADDER: No mass or calculus. BONES: Soft tissue mass involving the right sternum has not changed. Degenerative changes are seen in the thoracic and lumbar spine. ADDITIONAL COMMENTS: N/A  IMPRESSION  IMPRESSION:  Stable mediastinal lymphadenopathy. Progressive abdominal lymphadenopathy as  described above. Liver lesions have increased in size compared to the prior  exam. Stable right sternal mass. Moderate right hydroureteronephrosis is now  noted which appears to be related to the lymphadenopathy. ROS  Constitutional: no weight loss, fever, night sweats  Respiratory: no cough, shortness of breath, or wheezing  Cardiovascular: no chest pain or dyspnea on exertion. Reports chest wall mass smaller, no associated pain. Heme: No abnormal bleeding  Gastrointestinal: no abdominal pain, change in bowel habits, or black or bloody stools  Genito-Urinary: no dysuria, trouble voiding, or hematuria  Musculoskeletal: + swelling in ankle - bilateral, mostly only at end of day  Neurological: mild neuropathy  Derm: pigmentation/induration medial left leg. Prior injury from fall.     Psych: positive for depression - controlled        PMH:  Past Medical History:   Diagnosis Date    Adverse effect of anesthesia     sleep apnea cpap machine useage     Anemia     Arthritis     DDD low back and knees    Asthma     uses albuterol prn only; none x 6 mos.   Never hospitalized    BRCA negative 2013    Calculus of kidney     Chronic kidney disease     kidney stones    Chronic pain     knee pain bilat    CINV (chemotherapy-induced nausea and vomiting) 2014    Decreased hearing, right     PATIENT STATES THIS IS DUE TO CHEMOTHERAPY    Diabetes ([address] Age 46    IDDM    Environmental allergies     Fibromyalgia     GERD (gastroesophageal reflux disease)     controlled with med    Hx of pulmonary embolus during pregnancy 2015    Hydronephrosis due to obstruction of ureter 3/18/2019    Hypertension     Ill-defined condition     Sepsis  -  SOURCE PORT    Ill-defined condition 2016    chemotherapy     Mass of chest wall 2018    Morbid obesity (HCC)     Murmur, heart     Other and unspecified hyperlipidemia     Ovarian cancer ([address] 2012, 2014    serous, high grade, stage IIIc. s/p chemotx (has port)    Psychiatric disorder     Depression/Anxiety    Rectal bleeding     Thromboembolus ([address]     POST PARTUM; resolved- PELVIS    Thyroid disease     HYPOTHYROID    Unspecified sleep apnea     CPAP, Dr. Judith Tinajero     PSH:  Past Surgical History:   Procedure Laterality Date    BREAST SURGERY PROCEDURE UNLISTED      Lymph node in left breast 2017   [facility]     X2    HX HYSTERECTOMY  2012    ROULA/BSO, tumor debulking, omentectomy for ovarian cancer    HX ORTHOPAEDIC Right     ankle-FX, R    HX ORTHOPAEDIC Right     FX R ARM    HX UROLOGICAL Right     STENT FOR KIDNEY STONES    HX VASCULAR ACCESS  2015    right chest- port placed    HX VASCULAR ACCESS Left 2017    TETE CATH     SOC:  Social History     Socioeconomic History    Marital status:  Spouse name: Not on file    Number of children: Not on file    Years of education: Not on file    Highest education level: Not on file   Occupational History    Not on file   Social Needs    Financial resource strain: Not on file    Food insecurity:     Worry: Not on file     Inability: Not on file    Transportation needs:     Medical: Not on file     Non-medical: Not on file   Tobacco Use    Smoking status: Never Smoker    Smokeless tobacco: Never Used   Substance and Sexual Activity    Alcohol use: Yes     Comment: 1 drink per month    Drug use: No    Sexual activity: Yes   Lifestyle    Physical activity:     Days per week: Not on file     Minutes per session: Not on file    Stress: Not on file   Relationships    Social connections:     Talks on phone: Not on file     Gets together: Not on file     Attends [employer] service: Not on file     Active member of club or organization: Not on file     Attends meetings of clubs or organizations: Not on file     Relationship status: Not on file    Intimate partner violence:     Fear of current or ex partner: Not on file     Emotionally abused: Not on file     Physically abused: Not on file     Forced sexual activity: Not on file   Other Topics Concern    Not on file   Social History Narrative    Lives in [facility] alone.  passed away in 5/16 of a heart attack. Has 2 sons. Disability. 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 Enjoys swimming and going to the gym.      Family History  Family History   Problem Relation Age of Onset    Hypertension Mother     Arthritis-osteo Mother     Hypertension Father     Arthritis-osteo Father     Cancer Father         PROSTATE    COPD Father     Hypertension Brother     Elevated Lipids Brother     Arthritis-osteo Brother     Hypertension Brother     Elevated Lipids Brother     Obesity Brother     Cancer Brother         PROSTATE    Anesth Problems Son         DELAYED AWAKENING    Diabetes Maternal Grandmother     Anesth Problems Paternal Grandmother         PATIENT STATES [address] STOPPED DURING SURGERY     Medications: (reviewed)  Current Outpatient Medications on File Prior to Visit   Medication Sig Dispense Refill    omega-3 acid ethyl esters (LOVAZA) 1 gram capsule TAKE 1 CAPSULE BY MOUTH TWICE A  Cap 2    LINZESS 145 mcg cap capsule TAKE 1 CAPSULE BY MOUTH EVERY DAYY 30 Cap 0    ondansetron (ZOFRAN ODT) 4 mg disintegrating tablet Take 1 Tab by mouth every eight (8) hours as needed for Nausea. Indications: Nausea and Vomiting caused by Cancer Drugs 30 Tab 2    ZEJULA 100 mg cap Take 100 mg by mouth two (2) times a day.  glucose blood VI test strips (TRUE METRIX GLUCOSE TEST STRIP) strip by Does Not Apply route See Admin Instructions. 30 Strip 5    lancets misc by Does Not Apply route daily. True Metrix lancets- test blood sugar once per day 30 Each 5    Blood-Glucose Meter monitoring kit Check up to tid, as directed 1 Kit 0    insulin detemir U-100 (LEVEMIR) 100 unit/mL injection 20 Units by SubCUTAneous route daily (with lunch).  montelukast (SINGULAIR) 10 mg tablet Take 10 mg by mouth nightly.  ascorbic acid, vitamin C, (VITAMIN C) 1,000 mg tablet Take 1,000 mg by mouth daily.  albuterol (PROVENTIL VENTOLIN) 2.5 mg /3 mL (0.083 %) nebulizer solution 2.5 mg by Nebulization route every four (4) hours as needed for Wheezing.  potassium chloride (KLOR-CON M10) 10 mEq tablet TAKE 1 TAB BY MOUTH TWO (2) DAYS A WEEK. WED AND FRI 90 Tab 1    telmisartan (MICARDIS) 20 mg tablet Take 1 Tab by mouth daily. 90 Tab 3    insulin aspart U-100 (NOVOLOG FLEXPEN U-100 INSULIN) 100 unit/mL inpn INJECT 20 UNITS BEFORE EACH MEAL + 4 UNITS FOR EVERY 50 MG/DL ABOVE 150 MG/DL.  UNITS PER DAY 30 mL 11    Dexlansoprazole (DEXILANT) 60 mg CpDB Take 1 Cap by mouth daily.  90 Cap 3    Liraglutide (VICTOZA) 0.6 mg/0.1 mL (18 mg/3 mL) pnij 1.8 mg by SubCUTAneous route daily (with lunch). 3 Pen 3    SYMBICORT 160-4.5 mcg/actuation HFAA Take 2 Puffs by inhalation two (2) times daily as needed. 2 Inhaler 3    clonazePAM (KLONOPIN) 0.5 mg tablet Take 0.5 mg by mouth nightly as needed.  sertraline (ZOLOFT) 50 mg tablet TAKE 1 TABLET BY MOUTH as needed  1    cholecalciferol, VITAMIN D3, (VITAMIN D3) 5,000 unit tab tablet Take 5,000 Units by mouth daily.  furosemide (LASIX) 40 mg tablet Take 40 mg by mouth every other day. 3    fluticasone propionate (FLONASE) 50 mcg/actuation nasal spray 2 SPRAYS IN BOTH NOSTRILS DAILY 32 g 3    levothyroxine (SYNTHROID) 150 mcg tablet TAKE 1 TABLET BY MOUTH EVERY DAY BEFORE BREAKFAST 90 Tab 0    EPINEPHrine (EPIPEN) 0.3 mg/0.3 mL injection INJECT 0.3 ML BY INTRAMUSCULAR ROUTE ONCE AS NEEDED FOR UP TO 1 DOSE.  1    fluticasone propionate (FLONASE) 50 mcg/actuation nasal spray       amoxicillin (AMOXIL) 500 mg capsule Prior to dental procedures      oxyCODONE IR (ROXICODONE) 5 mg immediate release tablet Take 1 Tab by mouth every four (4) hours as needed for Pain. Max Daily Amount: 30 mg. 45 Tab 0    azelastine (ASTELIN) 137 mcg (0.1 %) nasal spray 1 Ald[place] by Both Nostrils route daily as needed. Use in each nostril as directed        No current facility-administered medications on file prior to visit. Allergies: (reviewed)  Allergies   Allergen Reactions    Carboplatin Other (comments)     Patient developed shortness of breath, felt faint, coughing, skin flushed and \"itchy\". This occurred on the patients 8th treatment.  Iodinated Contrast- Oral And Iv Dye Rash     13 hr pre-medication prior to IV contrast.   Patient has done well with 1 hr pre-medication of (Solu-Medrol) 40mg &  (Benadryl) 50mg.     Lipitor [Atorvastatin] Myalgia    Shellfish Containing Products Hives    Tape [Adhesive] Itching and Other (comments)     \"op site-- clear,thin tape\"--caused blister     Tomato Hives    Olmesartan Other (comments)    Losartan Other (comments)     headaches    Percocet [Oxycodone-Acetaminophen] Other (comments)     Fever; however, current home med       OBJECTIVE  Physical Exam  Visit Vitals  BP (!) 131/93 (BP 1 Location: Left arm, BP Patient Position: Sitting)   Pulse 97   Ht 5' 9\" (1.753 m)   Wt 285 lb (129.3 kg)   LMP 09/07/2011   BMI 42.09 kg/m²     Physical Exam:  General: Alert and oriented. No acute distress. Well-nourished  HEENT: No thyroid enlargment. Neck supple without restrictions. Sclera normal. Normal occular motion. Moist mucous membranes. Lymphatics: No evidence of axillary, cervical, or subclavicular adenopathy. Respiratory: clear to auscultation and percussion to the bases. No CVAT. Chest wall mass is palpable along the superior sternum at the level of the superior aspect of the breasts. Normal overlying skin  Cardiovascular: regular rate and rhythm. No murmurs, rubs, or gallops. Gastrointestinal: soft, non-tender, non-distended, no masses or organomegaly. Well-healed incision. Musculoskeletal: normal gait. No joint tenderness, deformity or swelling. No muscular tenderness. Extremities: extremities normal, atraumatic, mild 1+ edema bilaterally. Pelvic: deferred today  Neuro: Grossly intact. Normal gait and movement. No acute deficit  Skin: No evidence of rashes or skin changes. DATE REVIEW as available:  Lab Results   Component Value Date/Time    WBC 6.7 06/13/2019 03:20 PM    Hemoglobin (POC) 10.5 (L) 03/20/2017 12:15 PM    HGB 8.0 (L) 06/13/2019 03:20 PM    Hematocrit (POC) 31 (L) 03/20/2017 12:15 PM    HCT 26.5 (L) 06/13/2019 03:20 PM    PLATELET 096 58/99/9583 03:20 PM    MCV 98.5 06/13/2019 03:20 PM     Lab Results   Component Value Date/Time    ABS.  NEUTROPHILS 3.8 06/13/2019 03:20 PM     Lab Results   Component Value Date/Time    Sodium 139 06/13/2019 03:20 PM    Potassium 4.9 06/13/2019 03:20 PM    Chloride 110 (H) 06/13/2019 03:20 PM    CO2 22 06/13/2019 03:20 PM    Anion gap 7 06/13/2019 03:20 PM    Glucose 110 (H) 06/13/2019 03:20 PM    BUN 36 (H) 06/13/2019 03:20 PM    Creatinine 1.76 (H) 06/13/2019 03:20 PM    BUN/Creatinine ratio 20 06/13/2019 03:20 PM    GFR est AA 36 (L) 06/13/2019 03:20 PM    GFR est non-AA 30 (L) 06/13/2019 03:20 PM    Calcium 9.3 06/13/2019 03:20 PM    Bilirubin, total 0.3 06/13/2019 03:20 PM    AST (SGOT) 35 06/13/2019 03:20 PM    Alk. phosphatase 104 06/13/2019 03:20 PM    Protein, total 8.9 (H) 06/13/2019 03:20 PM    Albumin 3.4 (L) 06/13/2019 03:20 PM    Globulin 5.5 (H) 06/13/2019 03:20 PM    A-G Ratio 0.6 (L) 06/13/2019 03:20 PM    ALT (SGPT) 26 06/13/2019 03:20 PM       ECHO 7/17/18  Left ventricle: Systolic function was normal. Ejection fraction was estimated in the range of 55 % to 60 %. There were no regional wall motion  abnormalities. Wall thickness was mildly increased. Left atrium: The atrium was mildly dilated. Mitral valve: There was mild regurgitation. Aortic valve: Leaflets exhibited sclerosis without stenosis. Not well visualized. IMPRESSION AND PLAN:  Ms. Mushtaq Ashton is a 63 y.o. female with recurrent, metastatic ovarian cancer. Heavily pre-treated. Lost to follow-up since 9/2018, at which time patient self-discontinued weekly paclitaxel. Initiated Jocelyn Parents on 1/23/19. Held after 1 month secondary to rising creatinine. Found to have right hydronephrosis. Right ureteral stent placed 3/18/19 with normalization of creatinine. Restarted Zejula at 100mg/daily on 4/23/19.  ECOG 1    Problem List Items Addressed This Visit        Endocrine    Hyperglycemia due to type 2 diabetes mellitus ([address]    Type 2 diabetes mellitus with nephropathy ([address]       Reproductive    Ovarian cancer ([address] - Primary    Relevant Orders    REFERRAL TO RADIATION ONCOLOGY       Other    Carcinomatosis ([address]    Morbid obesity ([address]    Chest mass    Relevant Orders    REFERRAL TO RADIATION ONCOLOGY    Chest wall mass    Relevant Orders    REFERRAL TO RADIATION ONCOLOGY          Reviewed patient's course to date. Tolerating Zejula 100mg/daily. Creatinine stable. Ca-125 decreasing. I have recommended continuation of Zejula at this time given response in her Ca-125. Last CT 2/5/19. May consider repeat imaging in July/August 2019 after a few months of treatment. Given increased pain at the chest wall mass, I have referred the patient to Radiation Oncology for consideration of radiation to this area to help control size and pain. Her pain is likely related to involvement of the sternum. Again noted that while the patient is BRCA negative, there is some evidence that PARPi may provide some benefit to BRCA wild-type patients. Previously I discussed goals of care, including that any type of treatment at this time is palliative in nature. I have reviewed her chemotherapy history, which is extensive; although the patient has been somewhat non-compliant with treatments with missed treatments \"here and there\". Stressed importance of remaining on treatment. Previously discussed that palliative therapy at this time has a response rate at best of 5-10% regardless of the type of treatment. Counseled patient regarding code status and advance care directives, which the patient is going to consider. She reports she has a \"living will\", but I am unsure as to her wishes regarding code status. Will plan to continue this dialogue at future visits. All questions and concerns were addressed with the patient and she is comfortable with the plan.     Enid Zapien MD

## 2019-06-18 ENCOUNTER — OFFICE VISIT (OUTPATIENT)
Dept: GYNECOLOGY | Age: 59
End: 2019-06-18

## 2019-06-18 VITALS
WEIGHT: 285 LBS | HEART RATE: 97 BPM | BODY MASS INDEX: 42.21 KG/M2 | DIASTOLIC BLOOD PRESSURE: 93 MMHG | SYSTOLIC BLOOD PRESSURE: 131 MMHG | HEIGHT: 69 IN

## 2019-06-18 DIAGNOSIS — C56.9 MALIGNANT NEOPLASM OF OVARY, UNSPECIFIED LATERALITY (HCC): Primary | ICD-10-CM

## 2019-06-18 DIAGNOSIS — E11.65 TYPE 2 DIABETES MELLITUS WITH HYPERGLYCEMIA, WITHOUT LONG-TERM CURRENT USE OF INSULIN (HCC): ICD-10-CM

## 2019-06-18 DIAGNOSIS — E66.01 MORBID OBESITY (HCC): ICD-10-CM

## 2019-06-18 DIAGNOSIS — R22.2 CHEST WALL MASS: ICD-10-CM

## 2019-06-18 DIAGNOSIS — E11.21 TYPE 2 DIABETES MELLITUS WITH NEPHROPATHY (HCC): ICD-10-CM

## 2019-06-18 DIAGNOSIS — C80.0 CARCINOMATOSIS (HCC): ICD-10-CM

## 2019-06-18 DIAGNOSIS — R22.2 CHEST MASS: ICD-10-CM

## 2019-06-18 NOTE — PROGRESS NOTES
One month follow up on Adreinne Carlitojair, pt had her labs drawn on 6/13/19, pt states the mass in her chest is more painful, pain is getting worse 6/10 on pain scale, states it is getting bigger, she states she can feel it    1. Have you been to the ER, urgent care clinic since your last visit? Hospitalized since your last visit?  no    2. Have you seen or consulted any other health care providers outside of the [address] [place] [address] since your last visit? Include any pap smears or colon screening.    no

## 2019-06-20 ENCOUNTER — TELEPHONE (OUTPATIENT)
Dept: GYNECOLOGY | Age: 59
End: 2019-06-20

## 2019-06-20 DIAGNOSIS — K21.9 GASTROESOPHAGEAL REFLUX DISEASE, ESOPHAGITIS PRESENCE NOT SPECIFIED: ICD-10-CM

## 2019-06-21 RX ORDER — DEXLANSOPRAZOLE 60 MG/1
CAPSULE, DELAYED RELEASE ORAL
Qty: 90 CAP | Refills: 3 | Status: ON HOLD | OUTPATIENT
Start: 2019-06-21 | End: 2021-01-01

## 2019-06-24 ENCOUNTER — HOSPITAL ENCOUNTER (OUTPATIENT)
Dept: RADIATION THERAPY | Age: 59
Discharge: HOME | End: 2019-06-24
Payer: MEDICARE

## 2019-06-27 ENCOUNTER — HOSPITAL ENCOUNTER (OUTPATIENT)
Dept: RADIATION THERAPY | Age: 59
Discharge: HOME | End: 2019-06-27
Payer: MEDICARE

## 2019-06-27 PROCEDURE — 77300 RADIATION THERAPY DOSE PLAN: CPT

## 2019-06-27 PROCEDURE — 77334 RADIATION TREATMENT AID(S): CPT

## 2019-06-27 PROCEDURE — 77412 RADIATION TX DELIVERY LVL 3: CPT

## 2019-06-27 PROCEDURE — 77387 GUIDANCE FOR RADJ TX DLVR: CPT

## 2019-06-27 PROCEDURE — 77295 3-D RADIOTHERAPY PLAN: CPT

## 2019-06-28 ENCOUNTER — HOSPITAL ENCOUNTER (OUTPATIENT)
Dept: RADIATION THERAPY | Age: 59
Discharge: HOME | End: 2019-06-28
Payer: MEDICARE

## 2019-06-28 LAB
ALBUMIN SERPL-MCNC: 4.2 G/DL (ref 3.5–5.5)
ALBUMIN/GLOB SERPL: 1.1 {RATIO} (ref 1.2–2.2)
ALP SERPL-CCNC: 102 IU/L (ref 39–117)
ALT SERPL-CCNC: 16 IU/L (ref 0–32)
AST SERPL-CCNC: 38 IU/L (ref 0–40)
BASOPHILS # BLD AUTO: 0.1 X10E3/UL (ref 0–0.2)
BASOPHILS NFR BLD AUTO: 1 %
BILIRUB SERPL-MCNC: 0.3 MG/DL (ref 0–1.2)
BUN SERPL-MCNC: 37 MG/DL (ref 6–24)
BUN/CREAT SERPL: 20 (ref 9–23)
CALCIUM SERPL-MCNC: 9.8 MG/DL (ref 8.7–10.2)
CANCER AG125 SERPL-ACNC: 252.9 U/ML (ref 0–38.1)
CHLORIDE SERPL-SCNC: 105 MMOL/L (ref 96–106)
CO2 SERPL-SCNC: 18 MMOL/L (ref 20–29)
CREAT SERPL-MCNC: 1.81 MG/DL (ref 0.57–1)
EOSINOPHIL # BLD AUTO: 0.5 X10E3/UL (ref 0–0.4)
EOSINOPHIL NFR BLD AUTO: 7 %
ERYTHROCYTE [DISTWIDTH] IN BLOOD BY AUTOMATED COUNT: 15.3 % (ref 12.3–15.4)
GLOBULIN SER CALC-MCNC: 3.9 G/DL (ref 1.5–4.5)
GLUCOSE SERPL-MCNC: 134 MG/DL (ref 65–99)
HCT VFR BLD AUTO: 26.1 % (ref 34–46.6)
HGB BLD-MCNC: 8.2 G/DL (ref 11.1–15.9)
IMM GRANULOCYTES # BLD AUTO: 0 X10E3/UL (ref 0–0.1)
IMM GRANULOCYTES NFR BLD AUTO: 0 %
LYMPHOCYTES # BLD AUTO: 2 X10E3/UL (ref 0.7–3.1)
LYMPHOCYTES NFR BLD AUTO: 27 %
MAGNESIUM SERPL-MCNC: 1.7 MG/DL (ref 1.6–2.3)
MCH RBC QN AUTO: 29.3 PG (ref 26.6–33)
MCHC RBC AUTO-ENTMCNC: 31.4 G/DL (ref 31.5–35.7)
MCV RBC AUTO: 93 FL (ref 79–97)
MONOCYTES # BLD AUTO: 0.7 X10E3/UL (ref 0.1–0.9)
MONOCYTES NFR BLD AUTO: 9 %
NEUTROPHILS # BLD AUTO: 4.1 X10E3/UL (ref 1.4–7)
NEUTROPHILS NFR BLD AUTO: 56 %
PLATELET # BLD AUTO: 213 X10E3/UL (ref 150–450)
POTASSIUM SERPL-SCNC: 5.3 MMOL/L (ref 3.5–5.2)
PROT SERPL-MCNC: 8.1 G/DL (ref 6–8.5)
RBC # BLD AUTO: 2.8 X10E6/UL (ref 3.77–5.28)
SODIUM SERPL-SCNC: 138 MMOL/L (ref 134–144)
WBC # BLD AUTO: 7.3 X10E3/UL (ref 3.4–10.8)

## 2019-06-28 PROCEDURE — 77412 RADIATION TX DELIVERY LVL 3: CPT

## 2019-06-28 PROCEDURE — 77387 GUIDANCE FOR RADJ TX DLVR: CPT

## 2019-07-01 ENCOUNTER — HOSPITAL ENCOUNTER (OUTPATIENT)
Dept: RADIATION THERAPY | Age: 59
Discharge: HOME | End: 2019-07-01
Payer: MEDICARE

## 2019-07-01 PROCEDURE — 77387 GUIDANCE FOR RADJ TX DLVR: CPT

## 2019-07-01 PROCEDURE — 77412 RADIATION TX DELIVERY LVL 3: CPT

## 2019-07-02 ENCOUNTER — HOSPITAL ENCOUNTER (OUTPATIENT)
Dept: RADIATION THERAPY | Age: 59
Discharge: HOME | End: 2019-07-02
Payer: MEDICARE

## 2019-07-02 PROCEDURE — 77412 RADIATION TX DELIVERY LVL 3: CPT

## 2019-07-02 PROCEDURE — 77387 GUIDANCE FOR RADJ TX DLVR: CPT

## 2019-07-03 ENCOUNTER — HOSPITAL ENCOUNTER (OUTPATIENT)
Dept: RADIATION THERAPY | Age: 59
Discharge: HOME | End: 2019-07-03
Payer: MEDICARE

## 2019-07-03 PROCEDURE — 77387 GUIDANCE FOR RADJ TX DLVR: CPT

## 2019-07-03 PROCEDURE — 77412 RADIATION TX DELIVERY LVL 3: CPT

## 2019-07-03 PROCEDURE — 77336 RADIATION PHYSICS CONSULT: CPT

## 2019-07-05 ENCOUNTER — HOSPITAL ENCOUNTER (OUTPATIENT)
Dept: RADIATION THERAPY | Age: 59
Discharge: HOME | End: 2019-07-05
Payer: MEDICARE

## 2019-07-05 PROCEDURE — 77412 RADIATION TX DELIVERY LVL 3: CPT

## 2019-07-05 PROCEDURE — 77387 GUIDANCE FOR RADJ TX DLVR: CPT

## 2019-07-08 ENCOUNTER — HOSPITAL ENCOUNTER (OUTPATIENT)
Dept: RADIATION THERAPY | Age: 59
Discharge: HOME | End: 2019-07-08
Payer: MEDICARE

## 2019-07-08 PROCEDURE — 77387 GUIDANCE FOR RADJ TX DLVR: CPT

## 2019-07-08 PROCEDURE — 77412 RADIATION TX DELIVERY LVL 3: CPT

## 2019-07-09 ENCOUNTER — HOSPITAL ENCOUNTER (OUTPATIENT)
Dept: RADIATION THERAPY | Age: 59
Discharge: HOME | End: 2019-07-09
Payer: MEDICARE

## 2019-07-09 PROCEDURE — 77412 RADIATION TX DELIVERY LVL 3: CPT

## 2019-07-09 PROCEDURE — 77387 GUIDANCE FOR RADJ TX DLVR: CPT

## 2019-07-10 ENCOUNTER — HOSPITAL ENCOUNTER (OUTPATIENT)
Dept: RADIATION THERAPY | Age: 59
Discharge: HOME | End: 2019-07-10
Payer: MEDICARE

## 2019-07-10 PROCEDURE — 77412 RADIATION TX DELIVERY LVL 3: CPT

## 2019-07-10 PROCEDURE — 77387 GUIDANCE FOR RADJ TX DLVR: CPT

## 2019-07-10 RX ORDER — SODIUM CHLORIDE 0.9 % (FLUSH) 0.9 %
5-10 SYRINGE (ML) INJECTION AS NEEDED
Status: CANCELLED | OUTPATIENT
Start: 2019-07-11

## 2019-07-10 RX ORDER — SODIUM CHLORIDE 9 MG/ML
10 INJECTION INTRAMUSCULAR; INTRAVENOUS; SUBCUTANEOUS AS NEEDED
Status: CANCELLED | OUTPATIENT
Start: 2019-07-11

## 2019-07-10 RX ORDER — HEPARIN 100 UNIT/ML
500 SYRINGE INTRAVENOUS AS NEEDED
Status: CANCELLED | OUTPATIENT
Start: 2019-07-11

## 2019-07-11 ENCOUNTER — HOSPITAL ENCOUNTER (OUTPATIENT)
Dept: INFUSION THERAPY | Age: 59
Discharge: HOME | End: 2019-07-11
Payer: MEDICARE

## 2019-07-11 VITALS
RESPIRATION RATE: 18 BRPM | HEART RATE: 100 BPM | DIASTOLIC BLOOD PRESSURE: 88 MMHG | SYSTOLIC BLOOD PRESSURE: 130 MMHG | TEMPERATURE: 97.5 F

## 2019-07-11 DIAGNOSIS — C56.9 MALIGNANT NEOPLASM OF OVARY, UNSPECIFIED LATERALITY (HCC): Primary | ICD-10-CM

## 2019-07-11 LAB
ALBUMIN SERPL-MCNC: 3.7 G/DL (ref 3.5–5)
ALBUMIN/GLOB SERPL: 0.8 {RATIO} (ref 1.1–2.2)
ALP SERPL-CCNC: 101 U/L (ref 45–117)
ALT SERPL-CCNC: 20 U/L (ref 12–78)
ANION GAP SERPL CALC-SCNC: 8 MMOL/L (ref 5–15)
AST SERPL-CCNC: 33 U/L (ref 15–37)
BASOPHILS # BLD: 0.1 K/UL (ref 0–0.1)
BASOPHILS NFR BLD: 1 % (ref 0–1)
BILIRUB SERPL-MCNC: 0.3 MG/DL (ref 0.2–1)
BUN SERPL-MCNC: 30 MG/DL (ref 6–20)
BUN/CREAT SERPL: 15 (ref 12–20)
CALCIUM SERPL-MCNC: 9.4 MG/DL (ref 8.5–10.1)
CANCER AG125 SERPL-ACNC: 185 U/ML (ref 1.5–35)
CHLORIDE SERPL-SCNC: 108 MMOL/L (ref 97–108)
CO2 SERPL-SCNC: 22 MMOL/L (ref 21–32)
CREAT SERPL-MCNC: 1.96 MG/DL (ref 0.55–1.02)
DIFFERENTIAL METHOD BLD: ABNORMAL
EOSINOPHIL # BLD: 0.3 K/UL (ref 0–0.4)
EOSINOPHIL NFR BLD: 6 % (ref 0–7)
ERYTHROCYTE [DISTWIDTH] IN BLOOD BY AUTOMATED COUNT: 14.1 % (ref 11.5–14.5)
GLOBULIN SER CALC-MCNC: 4.5 G/DL (ref 2–4)
GLUCOSE SERPL-MCNC: 93 MG/DL (ref 65–100)
HCT VFR BLD AUTO: 26.3 % (ref 35–47)
HGB BLD-MCNC: 7.9 G/DL (ref 11.5–16)
IMM GRANULOCYTES # BLD AUTO: 0 K/UL (ref 0–0.04)
IMM GRANULOCYTES NFR BLD AUTO: 0 % (ref 0–0.5)
LYMPHOCYTES # BLD: 0.8 K/UL (ref 0.8–3.5)
LYMPHOCYTES NFR BLD: 14 % (ref 12–49)
MCH RBC QN AUTO: 29.9 PG (ref 26–34)
MCHC RBC AUTO-ENTMCNC: 30 G/DL (ref 30–36.5)
MCV RBC AUTO: 99.6 FL (ref 80–99)
MONOCYTES # BLD: 0.6 K/UL (ref 0–1)
MONOCYTES NFR BLD: 11 % (ref 5–13)
NEUTS SEG # BLD: 3.8 K/UL (ref 1.8–8)
NEUTS SEG NFR BLD: 68 % (ref 32–75)
NRBC # BLD: 0 K/UL (ref 0–0.01)
NRBC BLD-RTO: 0 PER 100 WBC
PLATELET # BLD AUTO: 169 K/UL (ref 150–400)
PMV BLD AUTO: 9.5 FL (ref 8.9–12.9)
POTASSIUM SERPL-SCNC: 4.9 MMOL/L (ref 3.5–5.1)
PROT SERPL-MCNC: 8.2 G/DL (ref 6.4–8.2)
RBC # BLD AUTO: 2.64 M/UL (ref 3.8–5.2)
RBC MORPH BLD: ABNORMAL
SODIUM SERPL-SCNC: 138 MMOL/L (ref 136–145)
WBC # BLD AUTO: 5.6 K/UL (ref 3.6–11)

## 2019-07-11 PROCEDURE — 80053 COMPREHEN METABOLIC PANEL: CPT

## 2019-07-11 PROCEDURE — 85025 COMPLETE CBC W/AUTO DIFF WBC: CPT

## 2019-07-11 PROCEDURE — 86304 IMMUNOASSAY TUMOR CA 125: CPT

## 2019-07-11 PROCEDURE — 36591 DRAW BLOOD OFF VENOUS DEVICE: CPT

## 2019-07-11 PROCEDURE — 74011000250 HC RX REV CODE- 250: Performed by: PHYSICIAN ASSISTANT

## 2019-07-11 PROCEDURE — 74011250636 HC RX REV CODE- 250/636: Performed by: PHYSICIAN ASSISTANT

## 2019-07-11 PROCEDURE — 77030012965 HC NDL HUBR BBMI -A

## 2019-07-11 PROCEDURE — 36415 COLL VENOUS BLD VENIPUNCTURE: CPT

## 2019-07-11 RX ORDER — SODIUM CHLORIDE 9 MG/ML
10 INJECTION INTRAMUSCULAR; INTRAVENOUS; SUBCUTANEOUS AS NEEDED
Status: DISCONTINUED | OUTPATIENT
Start: 2019-07-11 | End: 2019-07-12 | Stop reason: HOSPADM

## 2019-07-11 RX ORDER — SODIUM CHLORIDE 0.9 % (FLUSH) 0.9 %
5-10 SYRINGE (ML) INJECTION AS NEEDED
Status: DISCONTINUED | OUTPATIENT
Start: 2019-07-11 | End: 2019-07-12 | Stop reason: HOSPADM

## 2019-07-11 RX ORDER — HEPARIN 100 UNIT/ML
500 SYRINGE INTRAVENOUS AS NEEDED
Status: DISCONTINUED | OUTPATIENT
Start: 2019-07-11 | End: 2019-07-12 | Stop reason: HOSPADM

## 2019-07-11 RX ADMIN — Medication 500 UNITS: at 15:49

## 2019-07-11 RX ADMIN — Medication 10 ML: at 15:49

## 2019-07-11 RX ADMIN — SODIUM CHLORIDE 10 ML: 9 INJECTION INTRAMUSCULAR; INTRAVENOUS; SUBCUTANEOUS at 15:50

## 2019-07-15 NOTE — PROGRESS NOTES
[facility] [address], [address] [facility]  P [phone]  F [phone]    Office Visit    Patient ID:  Name: Jyoti Magallon  MRN: [id number]  : 1960/58 y.o. Visit date: 2019    LEONID Goncalves is a 63 y.o.  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 The patient's previous treatment procedures include primary Taxol/carbo with retreatment in  and Doxil/Avastin, topotecan, gemzar and now single agent weekly Taxol initiated 18 following progression with chest wall involvement. In 2018, patient did not follow up and self-discontinued her treatment regimen. She presented to the ER on 18 with SOB. Negative workup for PE or MI. Admitted to Hospitalist service from -18. Managed for acute asthma exacerbation, acute FELICIA, and hyperkalemia. The patient was initiated on Amelia Bigger on 19. Underwent repeat CT C/A/P on 19 for purposes of following response to treatment. Patient was tolerating Amelia Bigger well for the first month, but then had a rise in her creatinine. However, she was also found to have hydronephrosis. Underwent right ureteral stent placement on 3/18/19. Restarted Zejula afterwards, although with continued rise in creatinine to >2. Denies current shortness of breath. Does report some pain in her chest where the mass is located. Denies cough or hemoptysis. Denies nausea, vomiting, constipation or diarrhea. Had some constipation when she started Amelia Bigger, but this has resolved with stool softeners. Denies fevers or chills. Does report that her appetite is a little less. Restarted Zejula at 100mg/daily on 19. Presents today for follow-up. Completed 2700 cGy of radiation to her chest wall mass on 19. Tolerating treatment well. Labs appropriate. The pain at her chest wall mass is much improved.  Otherwise the patient is really without complaints. Denies change in appetite or bowel habits. Denies vaginal bleeding, hematuria, hematochezia, constipation, diarrhea, nausea, vomiting, CP, SOB, fevers, or chills.       OncTx History:  9/21/12: MARAH/BSO/Cytoreductive surgery on 9/21/12 noted carcinomatosis/omental caking. 10/2012-2/2013: 6 cycles Carbo/Taxol  8/2014-11/2014: 6 Cycles carbo/Taxol  3/2015-5/13/15: 3 cycles Avastin/Doxil until progression  7/2015-8/2015 Weekly Topotecan   12/2015-3/2016: Weekly Topotecan  2/2017-9/2017:  Gemzar D1/D8 Q28 days for 6 cycles  4/2018: CT core needle bx chest wall mass: Metastatic high-grade serous carcinoma (see comment)   General Categorization   Positive for malignancy. 5/8/2018-Present: Taxol  V1,2,92; S/P  Cycle #4  8/14/2018. Patient discontinued treatment secondary to side effects and fatigue. 1/23/2019 - Initiated Nata Powell. Held do to rising creatinine. However, she was also found to have hydronephrosis. Underwent right ureteral stent placement on 3/18/19. Imaging Review:   CT C/A/P 2/5/19:  FINDINGS:   THYROID: No nodule. MEDIASTINUM: Mediastinal lymphadenopathy is unchanged. A reference superior  mediastinal lymph node is stable measuring 3.3 cm. SWATI: No mass or lymphadenopathy. THORACIC AORTA: No dissection or aneurysm. MAIN PULMONARY ARTERY: Normal in caliber. TRACHEA/BRONCHI: Patent. ESOPHAGUS: No wall thickening or dilatation. HEART: Normal in size. PLEURA: No effusion or pneumothorax. LUNGS: No nodule, mass, or airspace disease. LIVER: Evaluation of the liver is limited by lack of intravenous contrast. There  is a vague 4.4 cm hypodense lesion in the left hepatic lobe previously measuring  3.0 cm. Lesion in the right hepatic lobe now measures 3.3 cm, previously  measuring 2.7 cm. GALLBLADDER: Unremarkable. SPLEEN: Central soft tissue abnormality has increased in size, now measuring 4.9  cm, previously measuring 3.1 cm.   PANCREAS: Not well evaluated given the massive lymphadenopathy and lack of  intravenous contrast.  ADRENALS: Unremarkable. KIDNEYS: There is no moderate right hydronephrosis. STOMACH: Unremarkable. SMALL BOWEL: No dilatation or wall thickening. COLON: No dilatation or wall thickening. APPENDIX: Not visualized. PERITONEUM: Bulky gastrohepatic, periceliac, portacaval, and mesenteric  lymphadenopathy has increased. There is secondary invasion of the liver. Maximum  dimension in the upper abdomen is currently 10.4 cm, previously 9.3 cm. Elemental soft tissue nodules have increased in size, with a reference 7.0 cm  nodule in the left abdomen previously measuring 5.5 cm. Soft tissue nodule in  the deep pelvis between the bladder and colon has increased in size as well. RETROPERITONEUM: No lymphadenopathy or aortic aneurysm. REPRODUCTIVE ORGANS: The uterus and ovaries are surgically absent. URINARY BLADDER: No mass or calculus. BONES: Soft tissue mass involving the right sternum has not changed. Degenerative changes are seen in the thoracic and lumbar spine. ADDITIONAL COMMENTS: N/A  IMPRESSION  IMPRESSION:  Stable mediastinal lymphadenopathy. Progressive abdominal lymphadenopathy as  described above. Liver lesions have increased in size compared to the prior  exam. Stable right sternal mass. Moderate right hydroureteronephrosis is now  noted which appears to be related to the lymphadenopathy. ROS  Constitutional: no weight loss, fever, night sweats  Respiratory: no cough, shortness of breath, or wheezing  Cardiovascular: no chest pain or dyspnea on exertion. Reports chest wall mass smaller, no associated pain. Heme: No abnormal bleeding  Gastrointestinal: no abdominal pain, change in bowel habits, or black or bloody stools  Genito-Urinary: no dysuria, trouble voiding, or hematuria  Musculoskeletal: + swelling in ankle - bilateral, mostly only at end of day  Neurological: mild neuropathy  Derm: pigmentation/induration medial left leg.  Prior injury from fall. Psych: positive for depression - controlled        PMH:  Past Medical History:   Diagnosis Date    Adverse effect of anesthesia     sleep apnea cpap machine useage     Anemia     Arthritis     DDD low back and knees    Asthma     uses albuterol prn only; none x 6 mos.   Never hospitalized    BRCA negative 2013    Calculus of kidney     Chronic kidney disease     kidney stones    Chronic pain     knee pain bilat    CINV (chemotherapy-induced nausea and vomiting) 2014    Decreased hearing, right     PATIENT STATES THIS IS DUE TO CHEMOTHERAPY    Diabetes ([address] Age 46    IDDM    Environmental allergies     Fibromyalgia     GERD (gastroesophageal reflux disease)     controlled with med    Hx of pulmonary embolus during pregnancy 2015    Hydronephrosis due to obstruction of ureter 3/18/2019    Hypertension     Ill-defined condition     Sepsis  -  SOURCE PORT    Ill-defined condition 2016    chemotherapy     Mass of chest wall 2018    Morbid obesity (HCC)     Murmur, heart     Other and unspecified hyperlipidemia     Ovarian cancer ([address] 2012, 2014    serous, high grade, stage IIIc. s/p chemotx (has port)    Psychiatric disorder     Depression/Anxiety    Rectal bleeding     Thromboembolus ([address]     POST PARTUM; resolved- PELVIS    Thyroid disease     HYPOTHYROID    Unspecified sleep apnea     CPAP, Dr. Jose De Jesus Schreiber     PSH:  Past Surgical History:   Procedure Laterality Date    BREAST SURGERY PROCEDURE UNLISTED      Lymph node in left breast 2017   [place]ter     X2    HX HYSTERECTOMY  2012    ROULA/BSO, tumor debulking, omentectomy for ovarian cancer    HX ORTHOPAEDIC Right     ankle-FX, R    HX ORTHOPAEDIC Right     FX R ARM    HX UROLOGICAL Right     STENT FOR KIDNEY STONES    HX VASCULAR ACCESS  2015    right chest- port placed    HX VASCULAR ACCESS Left 2017    TETE CATH     SOC:  Social History Socioeconomic History    Marital status:      Spouse name: Not on file    Number of children: Not on file    Years of education: Not on file    Highest education level: Not on file   Occupational History    Not on file   Social Needs    Financial resource strain: Not on file    Food insecurity:     Worry: Not on file     Inability: Not on file    Transportation needs:     Medical: Not on file     Non-medical: Not on file   Tobacco Use    Smoking status: Never Smoker    Smokeless tobacco: Never Used   Substance and Sexual Activity    Alcohol use: Yes     Comment: 1 drink per month    Drug use: No    Sexual activity: Yes   Lifestyle    Physical activity:     Days per week: Not on file     Minutes per session: Not on file    Stress: Not on file   Relationships    Social connections:     Talks on phone: Not on file     Gets together: Not on file     Attends Yazdanism service: Not on file     Active member of club or organization: Not on file     Attends meetings of clubs or organizations: Not on file     Relationship status: Not on file    Intimate partner violence:     Fear of current or ex partner: Not on file     Emotionally abused: Not on file     Physically abused: Not on file     Forced sexual activity: Not on file   Other Topics Concern    Not on file   Social History Narrative    Lives in [place] alone.  passed away in 5/16 of a heart attack. Has 2 sons. Disability. 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 Enjoys swimming and going to the gym.      Family History  Family History   Problem Relation Age of Onset    Hypertension Mother     Arthritis-osteo Mother     Hypertension Father     Arthritis-osteo Father     Cancer Father         PROSTATE    COPD Father     Hypertension Brother     Elevated Lipids Brother     Arthritis-osteo Brother     Hypertension Brother     Elevated Lipids Brother     Obesity Brother     Cancer Brother         PROSTATE  Anesth Problems Son         DELAYED AWAKENING    Diabetes Maternal Grandmother     Anesth Problems Paternal Grandmother         PATIENT STATES [address] STOPPED DURING SURGERY     Medications: (reviewed)  Current Outpatient Medications on File Prior to Visit   Medication Sig Dispense Refill    DEXILANT 60 mg CpDB capsule (delayed release) TAKE 1 CAPSULE BY MOUTH EVERY DAY 90 Cap 3    omega-3 acid ethyl esters (LOVAZA) 1 gram capsule TAKE 1 CAPSULE BY MOUTH TWICE A  Cap 2    LINZESS 145 mcg cap capsule TAKE 1 CAPSULE BY MOUTH EVERY DAYY 30 Cap 0    levothyroxine (SYNTHROID) 150 mcg tablet TAKE 1 TABLET BY MOUTH EVERY DAY BEFORE BREAKFAST 90 Tab 0    ZEJULA 100 mg cap Take 100 mg by mouth two (2) times a day.  glucose blood VI test strips (TRUE METRIX GLUCOSE TEST STRIP) strip by Does Not Apply route See Admin Instructions. 30 Strip 5    lancets misc by Does Not Apply route daily. True Metrix lancets- test blood sugar once per day 30 Each 5    Blood-Glucose Meter monitoring kit Check up to tid, as directed 1 Kit 0    insulin detemir U-100 (LEVEMIR) 100 unit/mL injection 20 Units by SubCUTAneous route daily (with lunch).  montelukast (SINGULAIR) 10 mg tablet Take 10 mg by mouth nightly.  ascorbic acid, vitamin C, (VITAMIN C) 1,000 mg tablet Take 1,000 mg by mouth daily.  albuterol (PROVENTIL VENTOLIN) 2.5 mg /3 mL (0.083 %) nebulizer solution 2.5 mg by Nebulization route every four (4) hours as needed for Wheezing.  telmisartan (MICARDIS) 20 mg tablet Take 1 Tab by mouth daily. 90 Tab 3    insulin aspart U-100 (NOVOLOG FLEXPEN U-100 INSULIN) 100 unit/mL inpn INJECT 20 UNITS BEFORE EACH MEAL + 4 UNITS FOR EVERY 50 MG/DL ABOVE 150 MG/DL.  UNITS PER DAY 30 mL 11    Liraglutide (VICTOZA) 0.6 mg/0.1 mL (18 mg/3 mL) pnij 1.8 mg by SubCUTAneous route daily (with lunch).  3 Pen 3    SYMBICORT 160-4.5 mcg/actuation HFAA Take 2 Puffs by inhalation two (2) times daily as needed. 2 Inhaler 3    clonazePAM (KLONOPIN) 0.5 mg tablet Take 0.5 mg by mouth nightly as needed.  sertraline (ZOLOFT) 50 mg tablet TAKE 1 TABLET BY MOUTH as needed  1    cholecalciferol, VITAMIN D3, (VITAMIN D3) 5,000 unit tab tablet Take 5,000 Units by mouth daily.  furosemide (LASIX) 40 mg tablet Take 40 mg by mouth every other day. 3    fluticasone propionate (FLONASE) 50 mcg/actuation nasal spray 2 SPRAYS IN BOTH NOSTRILS DAILY 32 g 3    ondansetron (ZOFRAN ODT) 4 mg disintegrating tablet Take 1 Tab by mouth every eight (8) hours as needed for Nausea. Indications: Nausea and Vomiting caused by Cancer Drugs 30 Tab 2    EPINEPHrine (EPIPEN) 0.3 mg/0.3 mL injection INJECT 0.3 ML BY INTRAMUSCULAR ROUTE ONCE AS NEEDED FOR UP TO 1 DOSE.  1    fluticasone propionate (FLONASE) 50 mcg/actuation nasal spray       amoxicillin (AMOXIL) 500 mg capsule Prior to dental procedures      oxyCODONE IR (ROXICODONE) 5 mg immediate release tablet Take 1 Tab by mouth every four (4) hours as needed for Pain. Max Daily Amount: 30 mg. 45 Tab 0    azelastine (ASTELIN) 137 mcg (0.1 %) nasal spray 1 [place]man by Both Nostrils route daily as needed. Use in each nostril as directed       potassium chloride (KLOR-CON M10) 10 mEq tablet TAKE 1 TAB BY MOUTH TWO (2) DAYS A WEEK. WED AND FRI 90 Tab 1     No current facility-administered medications on file prior to visit. Allergies: (reviewed)  Allergies   Allergen Reactions    Carboplatin Other (comments)     Patient developed shortness of breath, felt faint, coughing, skin flushed and \"itchy\". This occurred on the patients 8th treatment.  Iodinated Contrast- Oral And Iv Dye Rash     13 hr pre-medication prior to IV contrast.   Patient has done well with 1 hr pre-medication of (Solu-Medrol) 40mg &  (Benadryl) 50mg.     Lipitor [Atorvastatin] Myalgia    Shellfish Containing Products Hives    Tape [Adhesive] Itching and Other (comments)     \"op site-- clear,thin tape\"--caused blister     Tomato Hives    Olmesartan Other (comments)    Losartan Other (comments)     headaches    Percocet [Oxycodone-Acetaminophen] Other (comments)     Fever; however, current home med       OBJECTIVE  Physical Exam  Visit Vitals  /65 (BP 1 Location: Left arm, BP Patient Position: Sitting)   Pulse (!) 107   Ht 5' 9\" (1.753 m)   Wt 281 lb 9.6 oz (127.7 kg)   LMP 09/07/2011   BMI 41.59 kg/m²     Physical Exam:  General: Alert and oriented. No acute distress. Well-nourished  HEENT: No thyroid enlargment. Neck supple without restrictions. Sclera normal. Normal occular motion. Moist mucous membranes. Lymphatics: No evidence of axillary, cervical, or subclavicular adenopathy. Respiratory: clear to auscultation and percussion to the bases. No CVAT. Chest wall mass is still palpable along the superior sternum at the level of the superior aspect of the breasts, but improved. Normal overlying skin  Cardiovascular: regular rate and rhythm. No murmurs, rubs, or gallops. Gastrointestinal: soft, non-tender, non-distended, no masses or organomegaly. Well-healed incision. Musculoskeletal: normal gait. No joint tenderness, deformity or swelling. No muscular tenderness. Extremities: extremities normal, atraumatic, mild 1+ edema bilaterally. Pelvic: deferred today  Neuro: Grossly intact. Normal gait and movement. No acute deficit  Skin: No evidence of rashes or skin changes. DATE REVIEW as available:  Lab Results   Component Value Date/Time    WBC 5.6 07/11/2019 03:47 PM    Hemoglobin (POC) 10.5 (L) 03/20/2017 12:15 PM    HGB 7.9 (L) 07/11/2019 03:47 PM    Hematocrit (POC) 31 (L) 03/20/2017 12:15 PM    HCT 26.3 (L) 07/11/2019 03:47 PM    PLATELET 343 82/12/0991 03:47 PM    MCV 99.6 (H) 07/11/2019 03:47 PM     Lab Results   Component Value Date/Time    ABS.  NEUTROPHILS 3.8 07/11/2019 03:47 PM     Lab Results   Component Value Date/Time    Sodium 138 07/11/2019 03:47 PM    Potassium 4.9 07/11/2019 03:47 PM    Chloride 108 07/11/2019 03:47 PM    CO2 22 07/11/2019 03:47 PM    Anion gap 8 07/11/2019 03:47 PM    Glucose 93 07/11/2019 03:47 PM    BUN 30 (H) 07/11/2019 03:47 PM    Creatinine 1.96 (H) 07/11/2019 03:47 PM    BUN/Creatinine ratio 15 07/11/2019 03:47 PM    GFR est AA 32 (L) 07/11/2019 03:47 PM    GFR est non-AA 26 (L) 07/11/2019 03:47 PM    Calcium 9.4 07/11/2019 03:47 PM    Bilirubin, total 0.3 07/11/2019 03:47 PM    AST (SGOT) 33 07/11/2019 03:47 PM    Alk. phosphatase 101 07/11/2019 03:47 PM    Protein, total 8.2 07/11/2019 03:47 PM    Albumin 3.7 07/11/2019 03:47 PM    Globulin 4.5 (H) 07/11/2019 03:47 PM    A-G Ratio 0.8 (L) 07/11/2019 03:47 PM    ALT (SGPT) 20 07/11/2019 03:47 PM       ECHO 7/17/18  Left ventricle: Systolic function was normal. Ejection fraction was estimated in the range of 55 % to 60 %. There were no regional wall motion  abnormalities. Wall thickness was mildly increased. Left atrium: The atrium was mildly dilated. Mitral valve: There was mild regurgitation. Aortic valve: Leaflets exhibited sclerosis without stenosis. Not well visualized. IMPRESSION AND PLAN:  Ms. Awa Lundberg is a 63 y.o. female with recurrent, metastatic ovarian cancer. Heavily pre-treated. Lost to follow-up since 9/2018, at which time patient self-discontinued weekly paclitaxel. Initiated Isabell Lie on 1/23/19. Held after 1 month secondary to rising creatinine. Found to have right hydronephrosis. Right ureteral stent placed 3/18/19 with normalization of creatinine. Restarted Zejula at 100mg/daily on 4/23/19. ECOG 1    Problem List Items Addressed This Visit        Circulatory    Pulmonary hypertension, mild (HCC)       Endocrine    Hyperglycemia due to type 2 diabetes mellitus (HCC)       Immune    Lymphadenopathy, mediastinal       Reproductive    Ovarian cancer ([facility] - Primary       Other    Chest pain, atypical    Chest wall mass          Reviewed patient's course to date. Tolerating Zejula 100mg/daily. Creatinine stable. Ca-125 decreasing. Borderline anemia with Hgb of 7.9. I have recommended continuation of Zejula at this time given response in her Ca-125. Will recheck labs in 2 weeks to follow-up anemia. Last CT 2/5/19. May consider repeat imaging in July/August 2019 after a few months of treatment. Given increased pain at the chest wall mass, she was referred to Radiation Oncology and completed 2700 cGy to the sternum on 7/11/19. Will RTC in 1 month for consideration of continuation of Zejula. Again noted that while the patient is BRCA negative, there is some evidence that PARPi may provide some benefit to BRCA wild-type patients. Previously I discussed goals of care, including that any type of treatment at this time is palliative in nature. I have reviewed her chemotherapy history, which is extensive; although the patient has been somewhat non-compliant with treatments with missed treatments \"here and there\". Stressed importance of remaining on treatment. Previously discussed that palliative therapy at this time has a response rate at best of 5-10% regardless of the type of treatment. Counseled patient regarding code status and advance care directives, which the patient is going to consider. She reports she has a \"living will\", but I am unsure as to her wishes regarding code status. Will plan to continue this dialogue at future visits. All questions and concerns were addressed with the patient and she is comfortable with the plan.     Miranda Gonzales MD

## 2019-07-16 ENCOUNTER — OFFICE VISIT (OUTPATIENT)
Dept: GYNECOLOGY | Age: 59
End: 2019-07-16

## 2019-07-16 VITALS
DIASTOLIC BLOOD PRESSURE: 65 MMHG | HEART RATE: 107 BPM | WEIGHT: 281.6 LBS | BODY MASS INDEX: 41.71 KG/M2 | HEIGHT: 69 IN | SYSTOLIC BLOOD PRESSURE: 105 MMHG

## 2019-07-16 DIAGNOSIS — R07.89 CHEST PAIN, ATYPICAL: ICD-10-CM

## 2019-07-16 DIAGNOSIS — E11.65 TYPE 2 DIABETES MELLITUS WITH HYPERGLYCEMIA, WITHOUT LONG-TERM CURRENT USE OF INSULIN (HCC): ICD-10-CM

## 2019-07-16 DIAGNOSIS — R59.0 LYMPHADENOPATHY, MEDIASTINAL: ICD-10-CM

## 2019-07-16 DIAGNOSIS — I27.20 PULMONARY HYPERTENSION, MILD (HCC): ICD-10-CM

## 2019-07-16 DIAGNOSIS — R22.2 CHEST WALL MASS: ICD-10-CM

## 2019-07-16 DIAGNOSIS — C56.9 MALIGNANT NEOPLASM OF OVARY, UNSPECIFIED LATERALITY (HCC): Primary | ICD-10-CM

## 2019-07-16 NOTE — PROGRESS NOTES
One month follow up, pt is on Conchetta Lyme, pt had labs drawn on 7/11/19    1. Have you been to the ER, urgent care clinic since your last visit? Hospitalized since your last visit? no     2. Have you seen or consulted any other health care providers outside of the [address] [place] [address] since your last visit? Include any pap smears or colon screening.    no

## 2019-07-23 ENCOUNTER — TELEPHONE (OUTPATIENT)
Dept: GYNECOLOGY | Age: 59
End: 2019-07-23

## 2019-07-26 LAB
ALBUMIN SERPL-MCNC: 4.3 G/DL (ref 3.5–5.5)
ALBUMIN/GLOB SERPL: 1.1 {RATIO} (ref 1.2–2.2)
ALP SERPL-CCNC: 94 IU/L (ref 39–117)
ALT SERPL-CCNC: 15 IU/L (ref 0–32)
AST SERPL-CCNC: 33 IU/L (ref 0–40)
BASOPHILS # BLD AUTO: 0 X10E3/UL (ref 0–0.2)
BASOPHILS NFR BLD AUTO: 1 %
BILIRUB SERPL-MCNC: 0.3 MG/DL (ref 0–1.2)
BUN SERPL-MCNC: 27 MG/DL (ref 6–24)
BUN/CREAT SERPL: 16 (ref 9–23)
CALCIUM SERPL-MCNC: 9.3 MG/DL (ref 8.7–10.2)
CANCER AG125 SERPL-ACNC: 254 U/ML (ref 0–38.1)
CHLORIDE SERPL-SCNC: 105 MMOL/L (ref 96–106)
CO2 SERPL-SCNC: 20 MMOL/L (ref 20–29)
CREAT SERPL-MCNC: 1.65 MG/DL (ref 0.57–1)
EOSINOPHIL # BLD AUTO: 0.4 X10E3/UL (ref 0–0.4)
EOSINOPHIL NFR BLD AUTO: 6 %
ERYTHROCYTE [DISTWIDTH] IN BLOOD BY AUTOMATED COUNT: 15.1 % (ref 12.3–15.4)
GLOBULIN SER CALC-MCNC: 4 G/DL (ref 1.5–4.5)
GLUCOSE SERPL-MCNC: 124 MG/DL (ref 65–99)
HCT VFR BLD AUTO: 25.2 % (ref 34–46.6)
HGB BLD-MCNC: 8.1 G/DL (ref 11.1–15.9)
IMM GRANULOCYTES # BLD AUTO: 0 X10E3/UL (ref 0–0.1)
IMM GRANULOCYTES NFR BLD AUTO: 0 %
LYMPHOCYTES # BLD AUTO: 0.9 X10E3/UL (ref 0.7–3.1)
LYMPHOCYTES NFR BLD AUTO: 16 %
MAGNESIUM SERPL-MCNC: 1.8 MG/DL (ref 1.6–2.3)
MCH RBC QN AUTO: 30.2 PG (ref 26.6–33)
MCHC RBC AUTO-ENTMCNC: 32.1 G/DL (ref 31.5–35.7)
MCV RBC AUTO: 94 FL (ref 79–97)
MONOCYTES # BLD AUTO: 0.6 X10E3/UL (ref 0.1–0.9)
MONOCYTES NFR BLD AUTO: 10 %
NEUTROPHILS # BLD AUTO: 3.8 X10E3/UL (ref 1.4–7)
NEUTROPHILS NFR BLD AUTO: 67 %
PLATELET # BLD AUTO: 175 X10E3/UL (ref 150–450)
POTASSIUM SERPL-SCNC: 4.8 MMOL/L (ref 3.5–5.2)
PROT SERPL-MCNC: 8.3 G/DL (ref 6–8.5)
RBC # BLD AUTO: 2.68 X10E6/UL (ref 3.77–5.28)
SODIUM SERPL-SCNC: 141 MMOL/L (ref 134–144)
WBC # BLD AUTO: 5.6 X10E3/UL (ref 3.4–10.8)

## 2019-07-29 ENCOUNTER — TELEPHONE (OUTPATIENT)
Dept: GYNECOLOGY | Age: 59
End: 2019-07-29

## 2019-07-29 NOTE — TELEPHONE ENCOUNTER
Lab results from 7/25/19 given to pt. She is going oot to [facility] until 8/12/19. Have requested she have lab work repeated when she returns in preparation for OV with Dr. Zofia Jerome on 8/13/19.

## 2019-08-03 DIAGNOSIS — E11.65 TYPE 2 DIABETES MELLITUS WITH HYPERGLYCEMIA, WITH LONG-TERM CURRENT USE OF INSULIN (HCC): ICD-10-CM

## 2019-08-03 DIAGNOSIS — Z79.4 TYPE 2 DIABETES MELLITUS WITH HYPERGLYCEMIA, WITH LONG-TERM CURRENT USE OF INSULIN (HCC): ICD-10-CM

## 2019-08-04 RX ORDER — LIRAGLUTIDE 6 MG/ML
INJECTION SUBCUTANEOUS
Qty: 27 ADJUSTABLE DOSE PRE-FILLED PEN SYRINGE | Refills: 3 | Status: SHIPPED | OUTPATIENT
Start: 2019-08-04 | End: 2020-01-13 | Stop reason: SDUPTHER

## 2019-08-08 ENCOUNTER — APPOINTMENT (OUTPATIENT)
Dept: INFUSION THERAPY | Age: 59
End: 2019-08-08
Payer: MEDICARE

## 2019-08-12 NOTE — PROGRESS NOTES
[facility] [address], [address] [facility]  P [phone]  F [phone]    Office Visit    Patient ID:  Name: Jaleesa Gregorio  MRN: [id number]  : 1960/59 y.o. Visit date: 2019    LEONID Lazar is a 63 y.o.  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 The patient's previous treatment procedures include primary Taxol/carbo with retreatment in  and Doxil/Avastin, topotecan, gemzar and now single agent weekly Taxol initiated 18 following progression with chest wall involvement. In 2018, patient did not follow up and self-discontinued her treatment regimen. She presented to the ER on 18 with SOB. Negative workup for PE or MI. Admitted to Hospitalist service from -18. Managed for acute asthma exacerbation, acute FELICIA, and hyperkalemia. The patient was initiated on Rexene Mayans on 19. Underwent repeat CT C/A/P on 19 for purposes of following response to treatment. Patient was tolerating Rexene Mayans well for the first month, but then had a rise in her creatinine. However, she was also found to have hydronephrosis. Underwent right ureteral stent placement on 3/18/19. Restarted Zejula afterwards, although with continued rise in creatinine to >2. Denies current shortness of breath. Does report some pain in her chest where the mass is located. Denies cough or hemoptysis. Denies nausea, vomiting, constipation or diarrhea. Had some constipation when she started Rexene Mayans, but this has resolved with stool softeners. Denies fevers or chills. Does report that her appetite is a little less. Restarted Zejula at 100mg/daily on 19. Presents today for follow-up. Completed 2700 cGy of radiation to her chest wall mass on 19. Tolerating treatment well. Labs appropriate. The pain at her chest wall mass is much improved.  Otherwise the patient is really without complaints. Denies change in appetite or bowel habits. Denies vaginal bleeding, hematuria, hematochezia, constipation, diarrhea, nausea, vomiting, CP, SOB, fevers, or chills. Just returned from her vacation in [facility] yesterday.       OncTx History:  9/21/12: MARAH/BSO/Cytoreductive surgery on 9/21/12 noted carcinomatosis/omental caking. 10/2012-2/2013: 6 cycles Carbo/Taxol  8/2014-11/2014: 6 Cycles carbo/Taxol  3/2015-5/13/15: 3 cycles Avastin/Doxil until progression  7/2015-8/2015 Weekly Topotecan   12/2015-3/2016: Weekly Topotecan  2/2017-9/2017:  Gemzar D1/D8 Q28 days for 6 cycles  4/2018: CT core needle bx chest wall mass: Metastatic high-grade serous carcinoma (see comment)   General Categorization   Positive for malignancy. 5/8/2018-Present: Taxol  O9,8,24; S/P  Cycle #4  8/14/2018. Patient discontinued treatment secondary to side effects and fatigue. 1/23/2019 - Initiated Onetha Crate. Held do to rising creatinine. However, she was also found to have hydronephrosis. Underwent right ureteral stent placement on 3/18/19. Imaging Review:   CT C/A/P 2/5/19:  FINDINGS:   THYROID: No nodule. MEDIASTINUM: Mediastinal lymphadenopathy is unchanged. A reference superior  mediastinal lymph node is stable measuring 3.3 cm. SWTAI: No mass or lymphadenopathy. THORACIC AORTA: No dissection or aneurysm. MAIN PULMONARY ARTERY: Normal in caliber. TRACHEA/BRONCHI: Patent. ESOPHAGUS: No wall thickening or dilatation. HEART: Normal in size. PLEURA: No effusion or pneumothorax. LUNGS: No nodule, mass, or airspace disease. LIVER: Evaluation of the liver is limited by lack of intravenous contrast. There  is a vague 4.4 cm hypodense lesion in the left hepatic lobe previously measuring  3.0 cm. Lesion in the right hepatic lobe now measures 3.3 cm, previously  measuring 2.7 cm. GALLBLADDER: Unremarkable.   SPLEEN: Central soft tissue abnormality has increased in size, now measuring 4.9  cm, previously measuring 3.1 cm. PANCREAS: Not well evaluated given the massive lymphadenopathy and lack of  intravenous contrast.  ADRENALS: Unremarkable. KIDNEYS: There is no moderate right hydronephrosis. STOMACH: Unremarkable. SMALL BOWEL: No dilatation or wall thickening. COLON: No dilatation or wall thickening. APPENDIX: Not visualized. PERITONEUM: Bulky gastrohepatic, periceliac, portacaval, and mesenteric  lymphadenopathy has increased. There is secondary invasion of the liver. Maximum  dimension in the upper abdomen is currently 10.4 cm, previously 9.3 cm. Elemental soft tissue nodules have increased in size, with a reference 7.0 cm  nodule in the left abdomen previously measuring 5.5 cm. Soft tissue nodule in  the deep pelvis between the bladder and colon has increased in size as well. RETROPERITONEUM: No lymphadenopathy or aortic aneurysm. REPRODUCTIVE ORGANS: The uterus and ovaries are surgically absent. URINARY BLADDER: No mass or calculus. BONES: Soft tissue mass involving the right sternum has not changed. Degenerative changes are seen in the thoracic and lumbar spine. ADDITIONAL COMMENTS: N/A  IMPRESSION  IMPRESSION:  Stable mediastinal lymphadenopathy. Progressive abdominal lymphadenopathy as  described above. Liver lesions have increased in size compared to the prior  exam. Stable right sternal mass. Moderate right hydroureteronephrosis is now  noted which appears to be related to the lymphadenopathy. ROS  Constitutional: no weight loss, fever, night sweats  Respiratory: no cough, shortness of breath, or wheezing  Cardiovascular: no chest pain or dyspnea on exertion. Reports chest wall mass smaller, no associated pain.   Heme: No abnormal bleeding  Gastrointestinal: no abdominal pain, change in bowel habits, or black or bloody stools  Genito-Urinary: no dysuria, trouble voiding, or hematuria  Musculoskeletal: + swelling in ankle - bilateral, mostly only at end of day  Neurological: mild neuropathy  Derm: pigmentation/induration medial left leg. Prior injury from fall. Psych: positive for depression - controlled        PMH:  Past Medical History:   Diagnosis Date    Adverse effect of anesthesia     sleep apnea cpap machine useage     Anemia     Arthritis     DDD low back and knees    Asthma     uses albuterol prn only; none x 6 mos.   Never hospitalized    BRCA negative 2013    Calculus of kidney     Chronic kidney disease     kidney stones    Chronic pain     knee pain bilat    CINV (chemotherapy-induced nausea and vomiting) 2014    Decreased hearing, right     PATIENT STATES THIS IS DUE TO CHEMOTHERAPY    Diabetes ([address] Age 46    IDDM    Environmental allergies     Fibromyalgia     GERD (gastroesophageal reflux disease)     controlled with med    Hx of pulmonary embolus during pregnancy 2015    Hydronephrosis due to obstruction of ureter 3/18/2019    Hypertension     Ill-defined condition     Sepsis  -  SOURCE PORT    Ill-defined condition 2016    chemotherapy     Mass of chest wall 2018    Morbid obesity (HCC)     Murmur, heart     Other and unspecified hyperlipidemia     Ovarian cancer ([address] 2012, 2014    serous, high grade, stage IIIc. s/p chemotx (has port)    Psychiatric disorder     Depression/Anxiety    Rectal bleeding     Thromboembolus ([address]     POST PARTUM; resolved- PELVIS    Thyroid disease     HYPOTHYROID    Unspecified sleep apnea     CPAP, Dr. Lam Mccann     PSH:  Past Surgical History:   Procedure Laterality Date    BREAST SURGERY PROCEDURE UNLISTED      Lymph node in left breast 2017   Ono     X2    HX HYSTERECTOMY  2012    ROULA/BSO, tumor debulking, omentectomy for ovarian cancer    HX ORTHOPAEDIC Right     ankle-FX, R    HX ORTHOPAEDIC Right     FX R ARM    HX UROLOGICAL Right     STENT FOR KIDNEY STONES    HX VASCULAR ACCESS  2015    right chest- port placed    HX VASCULAR ACCESS Left 2017    TETE CATH     SOC:  Social History     Socioeconomic History    Marital status:      Spouse name: Not on file    Number of children: Not on file    Years of education: Not on file    Highest education level: Not on file   Occupational History    Not on file   Social Needs    Financial resource strain: Not on file    Food insecurity:     Worry: Not on file     Inability: Not on file    Transportation needs:     Medical: Not on file     Non-medical: Not on file   Tobacco Use    Smoking status: Never Smoker    Smokeless tobacco: Never Used   Substance and Sexual Activity    Alcohol use: Yes     Comment: 1 drink per month    Drug use: No    Sexual activity: Yes   Lifestyle    Physical activity:     Days per week: Not on file     Minutes per session: Not on file    Stress: Not on file   Relationships    Social connections:     Talks on phone: Not on file     Gets together: Not on file     Attends [employer] service: Not on file     Active member of club or organization: Not on file     Attends meetings of clubs or organizations: Not on file     Relationship status: Not on file    Intimate partner violence:     Fear of current or ex partner: Not on file     Emotionally abused: Not on file     Physically abused: Not on file     Forced sexual activity: Not on file   Other Topics Concern    Not on file   Social History Narrative    Lives in [facility] alone.  passed away in 5/16 of a heart attack. Has 2 sons. Disability. 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 Enjoys swimming and going to the gym.      Family History  Family History   Problem Relation Age of Onset    Hypertension Mother     Arthritis-osteo Mother     Hypertension Father     Arthritis-osteo Father     Cancer Father         PROSTATE    COPD Father     Hypertension Brother     Elevated Lipids Brother     Arthritis-osteo Brother     Hypertension Brother     Elevated Lipids Brother     Obesity Brother     Cancer Brother         PROSTATE    Anesth Problems Son         DELAYED AWAKENING    Diabetes Maternal Grandmother     Anesth Problems Paternal Grandmother         [address] STOPPED DURING SURGERY     Medications: (reviewed)  Current Outpatient Medications on File Prior to Visit   Medication Sig Dispense Refill    VICTOZA 3-ERNESTO 0.6 mg/0.1 mL (18 mg/3 mL) pnij INJECT 1.8 MG BY SUBCUTANEOUS ROUTE DAILY (WITH LUNCH). 27 Adjustable Dose Pre-filled Pen Syringe 3    DEXILANT 60 mg CpDB capsule (delayed release) TAKE 1 CAPSULE BY MOUTH EVERY DAY 90 Cap 3    omega-3 acid ethyl esters (LOVAZA) 1 gram capsule TAKE 1 CAPSULE BY MOUTH TWICE A  Cap 2    LINZESS 145 mcg cap capsule TAKE 1 CAPSULE BY MOUTH EVERY DAYY 30 Cap 0    levothyroxine (SYNTHROID) 150 mcg tablet TAKE 1 TABLET BY MOUTH EVERY DAY BEFORE BREAKFAST 90 Tab 0    EPINEPHrine (EPIPEN) 0.3 mg/0.3 mL injection INJECT 0.3 ML BY INTRAMUSCULAR ROUTE ONCE AS NEEDED FOR UP TO 1 DOSE.  1    ZEJULA 100 mg cap Take 100 mg by mouth two (2) times a day.  glucose blood VI test strips (TRUE METRIX GLUCOSE TEST STRIP) strip by Does Not Apply route See Admin Instructions. 30 Strip 5    lancets misc by Does Not Apply route daily. True Metrix lancets- test blood sugar once per day 30 Each 5    Blood-Glucose Meter monitoring kit Check up to tid, as directed 1 Kit 0    insulin detemir U-100 (LEVEMIR) 100 unit/mL injection 20 Units by SubCUTAneous route daily (with lunch).  ascorbic acid, vitamin C, (VITAMIN C) 1,000 mg tablet Take 1,000 mg by mouth daily.  telmisartan (MICARDIS) 20 mg tablet Take 1 Tab by mouth daily. 90 Tab 3    insulin aspart U-100 (NOVOLOG FLEXPEN U-100 INSULIN) 100 unit/mL inpn INJECT 20 UNITS BEFORE EACH MEAL + 4 UNITS FOR EVERY 50 MG/DL ABOVE 150 MG/DL.   UNITS PER DAY 30 mL 11    Liraglutide (VICTOZA) 0.6 mg/0.1 mL (18 mg/3 mL) pnij 1.8 mg by SubCUTAneous route daily (with lunch). 3 Pen 3    clonazePAM (KLONOPIN) 0.5 mg tablet Take 0.5 mg by mouth nightly as needed.  sertraline (ZOLOFT) 50 mg tablet TAKE 1 TABLET BY MOUTH as needed  1    cholecalciferol, VITAMIN D3, (VITAMIN D3) 5,000 unit tab tablet Take 5,000 Units by mouth daily.  furosemide (LASIX) 40 mg tablet Take 40 mg by mouth every other day. 3    fluticasone propionate (FLONASE) 50 mcg/actuation nasal spray 2 SPRAYS IN BOTH NOSTRILS DAILY 32 g 3    ondansetron (ZOFRAN ODT) 4 mg disintegrating tablet Take 1 Tab by mouth every eight (8) hours as needed for Nausea. Indications: Nausea and Vomiting caused by Cancer Drugs 30 Tab 2    fluticasone propionate (FLONASE) 50 mcg/actuation nasal spray       amoxicillin (AMOXIL) 500 mg capsule Prior to dental procedures      oxyCODONE IR (ROXICODONE) 5 mg immediate release tablet Take 1 Tab by mouth every four (4) hours as needed for Pain. Max Daily Amount: 30 mg. 45 Tab 0    azelastine (ASTELIN) 137 mcg (0.1 %) nasal spray 1 [place] by Both Nostrils route daily as needed. Use in each nostril as directed       montelukast (SINGULAIR) 10 mg tablet Take 10 mg by mouth nightly.  albuterol (PROVENTIL VENTOLIN) 2.5 mg /3 mL (0.083 %) nebulizer solution 2.5 mg by Nebulization route every four (4) hours as needed for Wheezing.  potassium chloride (KLOR-CON M10) 10 mEq tablet TAKE 1 TAB BY MOUTH TWO (2) DAYS A WEEK. WED AND FRI 90 Tab 1    SYMBICORT 160-4.5 mcg/actuation HFAA Take 2 Puffs by inhalation two (2) times daily as needed. 2 Inhaler 3     No current facility-administered medications on file prior to visit. Allergies: (reviewed)  Allergies   Allergen Reactions    Carboplatin Other (comments)     Patient developed shortness of breath, felt faint, coughing, skin flushed and \"itchy\". This occurred on the patients 8th treatment.     Iodinated Contrast Media Rash     13 hr pre-medication prior to IV contrast.   Patient has done well with 1 hr pre-medication of (Solu-Medrol) 40mg &  (Benadryl) 50mg.  Lipitor [Atorvastatin] Myalgia    Shellfish Containing Products Hives    Tape [Adhesive] Itching and Other (comments)     \"op site-- clear,thin tape\"--caused blister     Tomato Hives    Olmesartan Other (comments)    Losartan Other (comments)     headaches    Percocet [Oxycodone-Acetaminophen] Other (comments)     Fever; however, current home med       OBJECTIVE  Physical Exam  Visit Vitals  /78 (BP 1 Location: Left arm, BP Patient Position: Sitting)   Pulse (!) 102   Ht 5' 9\" (1.753 m)   Wt 282 lb 9.6 oz (128.2 kg)   LMP 09/07/2011   BMI 41.73 kg/m²     Physical Exam:  General: Alert and oriented. No acute distress. Well-nourished  HEENT: No thyroid enlargment. Neck supple without restrictions. Sclera normal. Normal occular motion. Moist mucous membranes. Lymphatics: No evidence of axillary, cervical, or subclavicular adenopathy. Respiratory: clear to auscultation and percussion to the bases. No CVAT. Chest wall mass is still palpable along the superior sternum at the level of the superior aspect of the breasts, but improved. Normal overlying skin  Cardiovascular: regular rate and rhythm. No murmurs, rubs, or gallops. Gastrointestinal: soft, non-tender, non-distended, no masses or organomegaly. Well-healed incision. Musculoskeletal: normal gait. No joint tenderness, deformity or swelling. No muscular tenderness. Extremities: extremities normal, atraumatic, mild 1+ edema bilaterally. Pelvic: deferred today  Neuro: Grossly intact. Normal gait and movement. No acute deficit  Skin: No evidence of rashes or skin changes.      DATE REVIEW as available:  Lab Results   Component Value Date/Time    WBC 5.6 07/25/2019 10:07 AM    Hemoglobin (POC) 10.5 (L) 03/20/2017 12:15 PM    HGB 8.1 (L) 07/25/2019 10:07 AM    Hematocrit (POC) 31 (L) 03/20/2017 12:15 PM    HCT 25.2 (L) 07/25/2019 10:07 AM    PLATELET 548 13/49/0876 10:07 AM    MCV 94 07/25/2019 10:07 AM     Lab Results   Component Value Date/Time    ABS. NEUTROPHILS 3.8 07/25/2019 10:07 AM     Lab Results   Component Value Date/Time    Sodium 141 07/25/2019 10:07 AM    Potassium 4.8 07/25/2019 10:07 AM    Chloride 105 07/25/2019 10:07 AM    CO2 20 07/25/2019 10:07 AM    Anion gap 8 07/11/2019 03:47 PM    Glucose 124 (H) 07/25/2019 10:07 AM    BUN 27 (H) 07/25/2019 10:07 AM    Creatinine 1.65 (H) 07/25/2019 10:07 AM    BUN/Creatinine ratio 16 07/25/2019 10:07 AM    GFR est AA 39 (L) 07/25/2019 10:07 AM    GFR est non-AA 34 (L) 07/25/2019 10:07 AM    Calcium 9.3 07/25/2019 10:07 AM    Bilirubin, total 0.3 07/25/2019 10:07 AM    AST (SGOT) 33 07/25/2019 10:07 AM    Alk. phosphatase 94 07/25/2019 10:07 AM    Protein, total 8.3 07/25/2019 10:07 AM    Albumin 4.3 07/25/2019 10:07 AM    Globulin 4.5 (H) 07/11/2019 03:47 PM    A-G Ratio 1.1 (L) 07/25/2019 10:07 AM    ALT (SGPT) 15 07/25/2019 10:07 AM       ECHO 7/17/18  Left ventricle: Systolic function was normal. Ejection fraction was estimated in the range of 55 % to 60 %. There were no regional wall motion  abnormalities. Wall thickness was mildly increased. Left atrium: The atrium was mildly dilated. Mitral valve: There was mild regurgitation. Aortic valve: Leaflets exhibited sclerosis without stenosis. Not well visualized. IMPRESSION AND PLAN:  Ms. Tigre Johnson is a 63 y.o. female with recurrent, metastatic ovarian cancer. Heavily pre-treated. Lost to follow-up since 9/2018, at which time patient self-discontinued weekly paclitaxel. Initiated Raejean Sicard on 1/23/19. Held after 1 month secondary to rising creatinine. Found to have right hydronephrosis. Right ureteral stent placed 3/18/19 with normalization of creatinine. Restarted Zejula at 100mg/daily on 4/23/19.  ECOG 1    Problem List Items Addressed This Visit        Reproductive    Ovarian cancer ([address]       Respiratory    SHABANA on CPAP       Other    Carcinomatosis ([address]    Morbid obesity ([address]    Chest wall mass    On antineoplastic chemotherapy    Pain due to malignant neoplasm metastatic to bone [facility])      Other Visit Diagnoses     Urinary frequency    -  Primary    Relevant Orders    URINALYSIS W/ RFLX MICROSCOPIC    CULTURE, URINE    Abnormal urine odor        Relevant Orders    URINALYSIS W/ RFLX MICROSCOPIC    CULTURE, URINE          Reviewed patient's course to date. Tolerating Zejula 100mg/daily. Creatinine stable. Ca-125 fluctuated this last time, but we will continue to follow. Will follow-up on her labs today. Her creatinine and Hgb seemed to have stabalized. Last CT 2/5/19. May consider repeat imaging if her Ca-125 continues to rise or fluctuate. Given increased pain at the chest wall mass, she was referred to Radiation Oncology and completed 2700 cGy to the sternum on 7/11/19. Will RTC in 1 month for consideration of continuation of Zejula. Again noted that while the patient is BRCA negative, there is some evidence that PARPi may provide some benefit to BRCA wild-type patients. Previously I discussed goals of care, including that any type of treatment at this time is palliative in nature. I have reviewed her chemotherapy history, which is extensive; although the patient has been somewhat non-compliant with treatments with missed treatments \"here and there\". Stressed importance of remaining on treatment. Previously discussed that palliative therapy at this time has a response rate at best of 5-10% regardless of the type of treatment. Counseled patient regarding code status and advance care directives, which the patient is going to consider. She reports she has a \"living will\", but I am unsure as to her wishes regarding code status. Will plan to continue this dialogue at future visits. Will f/u on UA and UCx from today as patient is having increased urgency/frequency. All questions and concerns were addressed with the patient and she is comfortable with the plan.     Jonathan Rivera Zofia Jerome MD

## 2019-08-13 ENCOUNTER — OFFICE VISIT (OUTPATIENT)
Dept: GYNECOLOGY | Age: 59
End: 2019-08-13

## 2019-08-13 VITALS
HEIGHT: 69 IN | SYSTOLIC BLOOD PRESSURE: 118 MMHG | WEIGHT: 282.6 LBS | DIASTOLIC BLOOD PRESSURE: 78 MMHG | BODY MASS INDEX: 41.86 KG/M2 | HEART RATE: 102 BPM

## 2019-08-13 DIAGNOSIS — C79.51 PAIN DUE TO MALIGNANT NEOPLASM METASTATIC TO BONE (HCC): ICD-10-CM

## 2019-08-13 DIAGNOSIS — R22.2 CHEST WALL MASS: ICD-10-CM

## 2019-08-13 DIAGNOSIS — G47.33 OSA ON CPAP: ICD-10-CM

## 2019-08-13 DIAGNOSIS — E66.01 MORBID OBESITY (HCC): ICD-10-CM

## 2019-08-13 DIAGNOSIS — Z99.89 OSA ON CPAP: ICD-10-CM

## 2019-08-13 DIAGNOSIS — G89.3 PAIN DUE TO MALIGNANT NEOPLASM METASTATIC TO BONE (HCC): ICD-10-CM

## 2019-08-13 DIAGNOSIS — C80.0 CARCINOMATOSIS (HCC): ICD-10-CM

## 2019-08-13 DIAGNOSIS — R82.90 ABNORMAL URINE ODOR: ICD-10-CM

## 2019-08-13 DIAGNOSIS — C56.9 MALIGNANT NEOPLASM OF OVARY, UNSPECIFIED LATERALITY (HCC): ICD-10-CM

## 2019-08-13 DIAGNOSIS — Z79.899 ON ANTINEOPLASTIC CHEMOTHERAPY: ICD-10-CM

## 2019-08-13 DIAGNOSIS — R35.0 URINARY FREQUENCY: Primary | ICD-10-CM

## 2019-08-13 NOTE — PROGRESS NOTES
One month check, pt is on Zejula, Pt states she just got back from vacation and has not had her labs drawn, she also complains of her urine having \"a foul odor\", and urinary frequency    1. Have you been to the ER, urgent care clinic since your last visit? Hospitalized since your last visit?  no    2. Have you seen or consulted any other health care providers outside of the [address] [place] [address] since your last visit? Include any pap smears or colon screening.    no

## 2019-08-15 DIAGNOSIS — E11.65 TYPE 2 DIABETES MELLITUS WITH HYPERGLYCEMIA, WITH LONG-TERM CURRENT USE OF INSULIN (HCC): ICD-10-CM

## 2019-08-15 DIAGNOSIS — Z79.4 TYPE 2 DIABETES MELLITUS WITH HYPERGLYCEMIA, WITH LONG-TERM CURRENT USE OF INSULIN (HCC): ICD-10-CM

## 2019-08-15 LAB
ALBUMIN SERPL-MCNC: 4.2 G/DL (ref 3.5–5.5)
ALBUMIN/GLOB SERPL: 1 {RATIO} (ref 1.2–2.2)
ALP SERPL-CCNC: 102 IU/L (ref 39–117)
ALT SERPL-CCNC: 14 IU/L (ref 0–32)
AST SERPL-CCNC: 38 IU/L (ref 0–40)
BASOPHILS # BLD AUTO: 0 X10E3/UL (ref 0–0.2)
BASOPHILS NFR BLD AUTO: 1 %
BILIRUB SERPL-MCNC: 0.3 MG/DL (ref 0–1.2)
BUN SERPL-MCNC: 25 MG/DL (ref 6–24)
BUN/CREAT SERPL: 14 (ref 9–23)
CALCIUM SERPL-MCNC: 9.7 MG/DL (ref 8.7–10.2)
CANCER AG125 SERPL-ACNC: 312 U/ML (ref 0–38.1)
CHLORIDE SERPL-SCNC: 104 MMOL/L (ref 96–106)
CO2 SERPL-SCNC: 19 MMOL/L (ref 20–29)
CREAT SERPL-MCNC: 1.74 MG/DL (ref 0.57–1)
EOSINOPHIL # BLD AUTO: 0.5 X10E3/UL (ref 0–0.4)
EOSINOPHIL NFR BLD AUTO: 7 %
ERYTHROCYTE [DISTWIDTH] IN BLOOD BY AUTOMATED COUNT: 14.8 % (ref 12.3–15.4)
GLOBULIN SER CALC-MCNC: 4.2 G/DL (ref 1.5–4.5)
GLUCOSE SERPL-MCNC: 130 MG/DL (ref 65–99)
HCT VFR BLD AUTO: 27.7 % (ref 34–46.6)
HGB BLD-MCNC: 9.1 G/DL (ref 11.1–15.9)
IMM GRANULOCYTES # BLD AUTO: 0 X10E3/UL (ref 0–0.1)
IMM GRANULOCYTES NFR BLD AUTO: 0 %
LYMPHOCYTES # BLD AUTO: 1 X10E3/UL (ref 0.7–3.1)
LYMPHOCYTES NFR BLD AUTO: 14 %
MAGNESIUM SERPL-MCNC: 1.7 MG/DL (ref 1.6–2.3)
MCH RBC QN AUTO: 31.9 PG (ref 26.6–33)
MCHC RBC AUTO-ENTMCNC: 32.9 G/DL (ref 31.5–35.7)
MCV RBC AUTO: 97 FL (ref 79–97)
MONOCYTES # BLD AUTO: 0.7 X10E3/UL (ref 0.1–0.9)
MONOCYTES NFR BLD AUTO: 9 %
NEUTROPHILS # BLD AUTO: 5 X10E3/UL (ref 1.4–7)
NEUTROPHILS NFR BLD AUTO: 69 %
PLATELET # BLD AUTO: 177 X10E3/UL (ref 150–450)
POTASSIUM SERPL-SCNC: 5.4 MMOL/L (ref 3.5–5.2)
PROT SERPL-MCNC: 8.4 G/DL (ref 6–8.5)
RBC # BLD AUTO: 2.85 X10E6/UL (ref 3.77–5.28)
SODIUM SERPL-SCNC: 140 MMOL/L (ref 134–144)
WBC # BLD AUTO: 7.2 X10E3/UL (ref 3.4–10.8)

## 2019-08-15 RX ORDER — INSULIN DETEMIR 100 [IU]/ML
INJECTION, SOLUTION SUBCUTANEOUS
Qty: 90 ADJUSTABLE DOSE PRE-FILLED PEN SYRINGE | Refills: 3 | Status: SHIPPED | OUTPATIENT
Start: 2019-08-15

## 2019-08-17 LAB
APPEARANCE UR: ABNORMAL
BACTERIA #/AREA URNS HPF: ABNORMAL /[HPF]
BACTERIA UR CULT: ABNORMAL
BACTERIA UR CULT: ABNORMAL
BILIRUB UR QL STRIP: NEGATIVE
CASTS URNS QL MICRO: ABNORMAL /LPF
COLOR UR: YELLOW
EPI CELLS #/AREA URNS HPF: ABNORMAL /HPF (ref 0–10)
GLUCOSE UR QL: NEGATIVE
HGB UR QL STRIP: ABNORMAL
KETONES UR QL STRIP: NEGATIVE
LEUKOCYTE ESTERASE UR QL STRIP: ABNORMAL
MICRO URNS: ABNORMAL
MUCOUS THREADS URNS QL MICRO: PRESENT
NITRITE UR QL STRIP: NEGATIVE
PH UR STRIP: 6.5 [PH] (ref 5–7.5)
PROT UR QL STRIP: ABNORMAL
RBC #/AREA URNS HPF: ABNORMAL /HPF (ref 0–2)
SP GR UR: 1.02 (ref 1–1.03)
UROBILINOGEN UR STRIP-MCNC: 0.2 MG/DL (ref 0.2–1)
WBC #/AREA URNS HPF: >30 /HPF (ref 0–5)

## 2019-08-19 ENCOUNTER — TELEPHONE (OUTPATIENT)
Dept: GYNECOLOGY | Age: 59
End: 2019-08-19

## 2019-08-19 DIAGNOSIS — R31.9 URINARY TRACT INFECTION WITH HEMATURIA, SITE UNSPECIFIED: Primary | ICD-10-CM

## 2019-08-19 DIAGNOSIS — N39.0 URINARY TRACT INFECTION WITH HEMATURIA, SITE UNSPECIFIED: Primary | ICD-10-CM

## 2019-08-19 DIAGNOSIS — N39.0 URINARY TRACT INFECTION WITHOUT HEMATURIA, SITE UNSPECIFIED: ICD-10-CM

## 2019-08-19 RX ORDER — CIPROFLOXACIN 250 MG/1
250 TABLET, FILM COATED ORAL 2 TIMES DAILY
Qty: 10 TAB | Refills: 0 | Status: SHIPPED | OUTPATIENT
Start: 2019-08-19 | End: 2019-08-24

## 2019-08-19 NOTE — TELEPHONE ENCOUNTER
----- Message from Fady Hernandez MD sent at 8/19/2019 11:04 AM EDT -----  Juliana Baig,     Can we send in a prescription for Ms. Jovan Funes for her UTI? Ciprofloxacin 250mg BID x 5 days.      Thanks,    Fady Hernandez MD

## 2019-08-19 NOTE — PROGRESS NOTES
Rx sent to pharmacy on file, pt was called and notified of results and that rx was sent to pharmacy, pt verbalized understanding

## 2019-08-19 NOTE — PROGRESS NOTES
Elias Paulino,     Can we send in a prescription for Ms. Michael Henry for her UTI? Ciprofloxacin 250mg BID x 5 days.      Thanks,    Mary Read MD

## 2019-09-01 RX ORDER — HEPARIN 100 UNIT/ML
500 SYRINGE INTRAVENOUS AS NEEDED
Status: CANCELLED | OUTPATIENT
Start: 2019-09-05

## 2019-09-01 RX ORDER — SODIUM CHLORIDE 9 MG/ML
10 INJECTION INTRAMUSCULAR; INTRAVENOUS; SUBCUTANEOUS AS NEEDED
Status: CANCELLED | OUTPATIENT
Start: 2019-09-05

## 2019-09-01 RX ORDER — SODIUM CHLORIDE 0.9 % (FLUSH) 0.9 %
5-10 SYRINGE (ML) INJECTION AS NEEDED
Status: CANCELLED | OUTPATIENT
Start: 2019-09-05

## 2019-09-05 ENCOUNTER — HOSPITAL ENCOUNTER (OUTPATIENT)
Dept: INFUSION THERAPY | Age: 59
Discharge: HOME | End: 2019-09-05
Payer: MEDICARE

## 2019-09-05 VITALS
HEART RATE: 100 BPM | SYSTOLIC BLOOD PRESSURE: 163 MMHG | TEMPERATURE: 97.7 F | RESPIRATION RATE: 18 BRPM | OXYGEN SATURATION: 97 % | DIASTOLIC BLOOD PRESSURE: 79 MMHG

## 2019-09-05 DIAGNOSIS — C56.9 MALIGNANT NEOPLASM OF OVARY, UNSPECIFIED LATERALITY (HCC): Primary | ICD-10-CM

## 2019-09-05 LAB
ALBUMIN SERPL-MCNC: 3.7 G/DL (ref 3.5–5)
ALBUMIN/GLOB SERPL: 0.7 {RATIO} (ref 1.1–2.2)
ALP SERPL-CCNC: 113 U/L (ref 45–117)
ALT SERPL-CCNC: 28 U/L (ref 12–78)
ANION GAP SERPL CALC-SCNC: 8 MMOL/L (ref 5–15)
APPEARANCE UR: ABNORMAL
AST SERPL-CCNC: 43 U/L (ref 15–37)
BACTERIA URNS QL MICRO: ABNORMAL /HPF
BASOPHILS # BLD: 0.1 K/UL (ref 0–0.1)
BASOPHILS NFR BLD: 1 % (ref 0–1)
BILIRUB SERPL-MCNC: 0.2 MG/DL (ref 0.2–1)
BILIRUB UR QL: NEGATIVE
BUN SERPL-MCNC: 39 MG/DL (ref 6–20)
BUN/CREAT SERPL: 18 (ref 12–20)
CALCIUM SERPL-MCNC: 9.1 MG/DL (ref 8.5–10.1)
CANCER AG125 SERPL-ACNC: 185 U/ML (ref 1.5–35)
CHLORIDE SERPL-SCNC: 112 MMOL/L (ref 97–108)
CO2 SERPL-SCNC: 22 MMOL/L (ref 21–32)
COLOR UR: ABNORMAL
CREAT SERPL-MCNC: 2.22 MG/DL (ref 0.55–1.02)
DIFFERENTIAL METHOD BLD: ABNORMAL
EOSINOPHIL # BLD: 0.4 K/UL (ref 0–0.4)
EOSINOPHIL NFR BLD: 6 % (ref 0–7)
EPITH CASTS URNS QL MICRO: ABNORMAL /LPF
ERYTHROCYTE [DISTWIDTH] IN BLOOD BY AUTOMATED COUNT: 13.2 % (ref 11.5–14.5)
GLOBULIN SER CALC-MCNC: 5.2 G/DL (ref 2–4)
GLUCOSE SERPL-MCNC: 109 MG/DL (ref 65–100)
GLUCOSE UR STRIP.AUTO-MCNC: NEGATIVE MG/DL
HCT VFR BLD AUTO: 25.8 % (ref 35–47)
HGB BLD-MCNC: 7.8 G/DL (ref 11.5–16)
HGB UR QL STRIP: ABNORMAL
IMM GRANULOCYTES # BLD AUTO: 0 K/UL (ref 0–0.04)
IMM GRANULOCYTES NFR BLD AUTO: 0 % (ref 0–0.5)
KETONES UR QL STRIP.AUTO: NEGATIVE MG/DL
LEUKOCYTE ESTERASE UR QL STRIP.AUTO: ABNORMAL
LYMPHOCYTES # BLD: 1.1 K/UL (ref 0.8–3.5)
LYMPHOCYTES NFR BLD: 18 % (ref 12–49)
MCH RBC QN AUTO: 30.8 PG (ref 26–34)
MCHC RBC AUTO-ENTMCNC: 30.2 G/DL (ref 30–36.5)
MCV RBC AUTO: 102 FL (ref 80–99)
MONOCYTES # BLD: 0.7 K/UL (ref 0–1)
MONOCYTES NFR BLD: 12 % (ref 5–13)
NEUTS SEG # BLD: 3.9 K/UL (ref 1.8–8)
NEUTS SEG NFR BLD: 63 % (ref 32–75)
NITRITE UR QL STRIP.AUTO: NEGATIVE
NRBC # BLD: 0 K/UL (ref 0–0.01)
NRBC BLD-RTO: 0 PER 100 WBC
PH UR STRIP: 5.5 [PH] (ref 5–8)
PLATELET # BLD AUTO: 161 K/UL (ref 150–400)
PMV BLD AUTO: 9.7 FL (ref 8.9–12.9)
POTASSIUM SERPL-SCNC: 4.7 MMOL/L (ref 3.5–5.1)
PROT SERPL-MCNC: 8.9 G/DL (ref 6.4–8.2)
PROT UR STRIP-MCNC: 100 MG/DL
RBC # BLD AUTO: 2.53 M/UL (ref 3.8–5.2)
RBC #/AREA URNS HPF: ABNORMAL /HPF (ref 0–5)
SODIUM SERPL-SCNC: 142 MMOL/L (ref 136–145)
SP GR UR REFRACTOMETRY: 1.01 (ref 1–1.03)
UA: UC IF INDICATED,UAUC: ABNORMAL
UROBILINOGEN UR QL STRIP.AUTO: 0.2 EU/DL (ref 0.2–1)
WBC # BLD AUTO: 6.2 K/UL (ref 3.6–11)
WBC URNS QL MICRO: >100 /HPF (ref 0–4)
YEAST URNS QL MICRO: PRESENT

## 2019-09-05 PROCEDURE — 86304 IMMUNOASSAY TUMOR CA 125: CPT

## 2019-09-05 PROCEDURE — 74011250636 HC RX REV CODE- 250/636: Performed by: OBSTETRICS & GYNECOLOGY

## 2019-09-05 PROCEDURE — 77030012965 HC NDL HUBR BBMI -A

## 2019-09-05 PROCEDURE — 80053 COMPREHEN METABOLIC PANEL: CPT

## 2019-09-05 PROCEDURE — 87086 URINE CULTURE/COLONY COUNT: CPT

## 2019-09-05 PROCEDURE — 36415 COLL VENOUS BLD VENIPUNCTURE: CPT

## 2019-09-05 PROCEDURE — 85025 COMPLETE CBC W/AUTO DIFF WBC: CPT

## 2019-09-05 PROCEDURE — 36591 DRAW BLOOD OFF VENOUS DEVICE: CPT

## 2019-09-05 PROCEDURE — 81001 URINALYSIS AUTO W/SCOPE: CPT

## 2019-09-05 RX ORDER — SODIUM CHLORIDE 9 MG/ML
10 INJECTION INTRAMUSCULAR; INTRAVENOUS; SUBCUTANEOUS AS NEEDED
Status: DISCONTINUED | OUTPATIENT
Start: 2019-09-05 | End: 2019-09-06 | Stop reason: HOSPADM

## 2019-09-05 RX ORDER — HEPARIN 100 UNIT/ML
500 SYRINGE INTRAVENOUS AS NEEDED
Status: DISCONTINUED | OUTPATIENT
Start: 2019-09-05 | End: 2019-09-06 | Stop reason: HOSPADM

## 2019-09-05 RX ORDER — SODIUM CHLORIDE 0.9 % (FLUSH) 0.9 %
5-10 SYRINGE (ML) INJECTION AS NEEDED
Status: DISCONTINUED | OUTPATIENT
Start: 2019-09-05 | End: 2019-09-06 | Stop reason: HOSPADM

## 2019-09-05 RX ADMIN — Medication 500 UNITS: at 16:21

## 2019-09-05 RX ADMIN — SODIUM CHLORIDE 10 ML: 9 INJECTION INTRAMUSCULAR; INTRAVENOUS; SUBCUTANEOUS at 16:21

## 2019-09-05 NOTE — PROGRESS NOTES
Outpatient Infusion Center Short Visit Progress Note    4942 Patient admitted to [facility] for [facility] Flush/Labs ambulatory in stable condition. Assessment completed. Patient c/o dysuria and cloudy urine. Spoke with Gabo Atkins RN. Orders received to send UA with reflex culture. Vital Signs:  Visit Vitals  /79 (BP 1 Location: Left arm, BP Patient Position: At rest)   Pulse 100   Temp 97.7 °F (36.5 °C)   Resp 18   LMP 09/07/2011   SpO2 97%         Port accessed with positive blood return. Labs sent for processing. Port flushed, heparinized and de-accessed. Medications:  Medications Administered     heparin (porcine) pf 500 Units     Admin Date  09/05/2019 Action  Given Dose  500 Units Route  IntraVENous Administered By  Mamie Lopez, RN          sodium chloride 0.9% injection 10 mL     Admin Date  09/05/2019 Action  Given Dose  10 mL Route  IntraVENous Administered By  Mamie Lopez, [address] Patient tolerated treatment well. Patient discharged from [place] [address] ambulatory in no distress. Patient aware of next appointment. See lab results when resulted in [address] [place] [address].      Future Appointments   Date Time Provider Gabriel Phillip   9/11/2019 10:00 AM Penny Sepulveda [facility]   10/3/2019  4:00 PM [facility] INFUSION NURSE 6 [facility]   10/9/2019 10:00 AM Fabi Resendiz MD [address] [place] [address]   10/31/2019  4:00 PM [facility] INFUSION NURSE 6 [facility]   11/6/2019 10:00 AM Fabi Resendiz MD [address] [place] [address]   11/27/2019  4:00 PM [address] [place] [address] INFUSION NURSE 6 [facility]   12/4/2019 10:15 AM Fabi Resendiz MD [address] [place] [address]   12/26/2019  4:00 PM [address] [place] [address] INFUSION NURSE 6 [facility]   12/31/2019 10:15 AM Fabi Resendiz MD [address] [place] [address]

## 2019-09-06 ENCOUNTER — TELEPHONE (OUTPATIENT)
Dept: GYNECOLOGY | Age: 59
End: 2019-09-06

## 2019-09-06 DIAGNOSIS — N30.01 ACUTE CYSTITIS WITH HEMATURIA: Primary | ICD-10-CM

## 2019-09-06 DIAGNOSIS — D64.9 ANEMIA, UNSPECIFIED TYPE: ICD-10-CM

## 2019-09-06 RX ORDER — FLUCONAZOLE 200 MG/1
200 TABLET ORAL DAILY
Qty: 14 TAB | Refills: 0 | Status: SHIPPED | OUTPATIENT
Start: 2019-09-06 | End: 2019-09-20

## 2019-09-06 RX ORDER — AMOXICILLIN AND CLAVULANATE POTASSIUM 500; 125 MG/1; MG/1
1 TABLET, FILM COATED ORAL 2 TIMES DAILY
Qty: 10 TAB | Refills: 0 | Status: SHIPPED | OUTPATIENT
Start: 2019-09-06 | End: 2019-09-11

## 2019-09-06 NOTE — TELEPHONE ENCOUNTER
Lab results from 9/5/19 called to pt. Saran TENA pt was informed of UTI, and yeast infection and medications he sent to pharmacy for her. Also informed her of labs she needs to have drawn before ov next week with Dr. Christoper Rinne, lab req faxed to [facility] [address] was ask if she had ever seen a Nephrologist and she has not, but knows her father's, and will get an appt with him (did not remember his name at this time).

## 2019-09-06 NOTE — PROGRESS NOTES
Dysuria reported yesterday at infusion center. UA \"dirty\" with yeast, 1+ bacteria. Culture pending. Rx augmentin, Diflucan sent to pharmacy. Previous cultures with enterococcus faecalis. Follow up on cultures. Anemia multifactorial (hx chemo, current PARPi, chronic dz, chronic renal dz). Anemia panel sent. Consider Epo with her nephrologist.  HTN noted yesterday, dont think she needs a transfusion. Recheck labs next week.     MALLIKA Valle

## 2019-09-07 LAB
BACTERIA SPEC CULT: NORMAL
CC UR VC: NORMAL
SERVICE CMNT-IMP: NORMAL

## 2019-09-10 LAB
ALBUMIN SERPL-MCNC: 4.2 G/DL (ref 3.5–5.5)
ALBUMIN/GLOB SERPL: 1.1 {RATIO} (ref 1.2–2.2)
ALP SERPL-CCNC: 97 IU/L (ref 39–117)
ALT SERPL-CCNC: 17 IU/L (ref 0–32)
AST SERPL-CCNC: 42 IU/L (ref 0–40)
BASOPHILS # BLD AUTO: 0 X10E3/UL (ref 0–0.2)
BASOPHILS NFR BLD AUTO: 1 %
BILIRUB SERPL-MCNC: 0.3 MG/DL (ref 0–1.2)
BUN SERPL-MCNC: 30 MG/DL (ref 6–24)
BUN/CREAT SERPL: 18 (ref 9–23)
CALCIUM SERPL-MCNC: 9.5 MG/DL (ref 8.7–10.2)
CANCER AG125 SERPL-ACNC: 240 U/ML (ref 0–38.1)
CHLORIDE SERPL-SCNC: 107 MMOL/L (ref 96–106)
CO2 SERPL-SCNC: 17 MMOL/L (ref 20–29)
CREAT SERPL-MCNC: 1.7 MG/DL (ref 0.57–1)
EOSINOPHIL # BLD AUTO: 0.3 X10E3/UL (ref 0–0.4)
EOSINOPHIL NFR BLD AUTO: 5 %
ERYTHROCYTE [DISTWIDTH] IN BLOOD BY AUTOMATED COUNT: 14.2 % (ref 12.3–15.4)
GLOBULIN SER CALC-MCNC: 3.8 G/DL (ref 1.5–4.5)
GLUCOSE SERPL-MCNC: 120 MG/DL (ref 65–99)
HCT VFR BLD AUTO: 25.3 % (ref 34–46.6)
HGB BLD-MCNC: 7.9 G/DL (ref 11.1–15.9)
IMM GRANULOCYTES # BLD AUTO: 0 X10E3/UL (ref 0–0.1)
IMM GRANULOCYTES NFR BLD AUTO: 0 %
LYMPHOCYTES # BLD AUTO: 1.3 X10E3/UL (ref 0.7–3.1)
LYMPHOCYTES NFR BLD AUTO: 20 %
MAGNESIUM SERPL-MCNC: 1.8 MG/DL (ref 1.6–2.3)
MCH RBC QN AUTO: 30.5 PG (ref 26.6–33)
MCHC RBC AUTO-ENTMCNC: 31.2 G/DL (ref 31.5–35.7)
MCV RBC AUTO: 98 FL (ref 79–97)
MONOCYTES # BLD AUTO: 0.6 X10E3/UL (ref 0.1–0.9)
MONOCYTES NFR BLD AUTO: 10 %
NEUTROPHILS # BLD AUTO: 4.1 X10E3/UL (ref 1.4–7)
NEUTROPHILS NFR BLD AUTO: 64 %
PLATELET # BLD AUTO: 173 X10E3/UL (ref 150–450)
POTASSIUM SERPL-SCNC: 5.1 MMOL/L (ref 3.5–5.2)
PROT SERPL-MCNC: 8 G/DL (ref 6–8.5)
RBC # BLD AUTO: 2.59 X10E6/UL (ref 3.77–5.28)
SODIUM SERPL-SCNC: 141 MMOL/L (ref 134–144)
WBC # BLD AUTO: 6.4 X10E3/UL (ref 3.4–10.8)

## 2019-09-11 ENCOUNTER — OFFICE VISIT (OUTPATIENT)
Dept: GYNECOLOGY | Age: 59
End: 2019-09-11

## 2019-09-11 VITALS
HEIGHT: 69 IN | DIASTOLIC BLOOD PRESSURE: 67 MMHG | WEIGHT: 272.4 LBS | HEART RATE: 96 BPM | SYSTOLIC BLOOD PRESSURE: 116 MMHG | BODY MASS INDEX: 40.35 KG/M2

## 2019-09-11 DIAGNOSIS — C56.9 MALIGNANT NEOPLASM OF OVARY, UNSPECIFIED LATERALITY (HCC): Primary | ICD-10-CM

## 2019-09-11 DIAGNOSIS — R22.2 CHEST WALL MASS: ICD-10-CM

## 2019-09-11 DIAGNOSIS — Z79.4 TYPE 2 DIABETES MELLITUS WITH HYPERGLYCEMIA, WITH LONG-TERM CURRENT USE OF INSULIN (HCC): ICD-10-CM

## 2019-09-11 DIAGNOSIS — E11.65 TYPE 2 DIABETES MELLITUS WITH HYPERGLYCEMIA, WITHOUT LONG-TERM CURRENT USE OF INSULIN (HCC): ICD-10-CM

## 2019-09-11 DIAGNOSIS — E11.21 TYPE 2 DIABETES MELLITUS WITH NEPHROPATHY (HCC): ICD-10-CM

## 2019-09-11 DIAGNOSIS — Z95.828 PORT-A-CATH IN PLACE: ICD-10-CM

## 2019-09-11 DIAGNOSIS — Z99.89 OSA ON CPAP: ICD-10-CM

## 2019-09-11 DIAGNOSIS — Z79.899 ON ANTINEOPLASTIC CHEMOTHERAPY: ICD-10-CM

## 2019-09-11 DIAGNOSIS — E11.65 TYPE 2 DIABETES MELLITUS WITH HYPERGLYCEMIA, WITH LONG-TERM CURRENT USE OF INSULIN (HCC): ICD-10-CM

## 2019-09-11 DIAGNOSIS — E78.5 DYSLIPIDEMIA (HIGH LDL; LOW HDL): ICD-10-CM

## 2019-09-11 DIAGNOSIS — G47.33 OSA ON CPAP: ICD-10-CM

## 2019-09-11 DIAGNOSIS — Z91.041 CONTRAST MEDIA ALLERGY: ICD-10-CM

## 2019-09-11 DIAGNOSIS — C80.0 CARCINOMATOSIS (HCC): ICD-10-CM

## 2019-09-11 DIAGNOSIS — I10 ESSENTIAL HYPERTENSION: ICD-10-CM

## 2019-09-11 RX ORDER — INSULIN ASPART 100 [IU]/ML
INJECTION, SOLUTION INTRAVENOUS; SUBCUTANEOUS
Qty: 90 ADJUSTABLE DOSE PRE-FILLED PEN SYRINGE | Refills: 3 | Status: ON HOLD | OUTPATIENT
Start: 2019-09-11 | End: 2021-01-01

## 2019-09-11 RX ORDER — PREDNISONE 10 MG/1
TABLET ORAL
Qty: 15 TAB | Refills: 0 | Status: CANCELLED | OUTPATIENT
Start: 2019-09-11

## 2019-09-11 NOTE — PROGRESS NOTES
one month follow up on Nikki Marx, pt did have iron panel and B12[facility]Folate lab [facility] Monday    1. Have you been to the ER, urgent care clinic since your last visit? Hospitalized since your last visit?  no    2. Have you seen or consulted any other health care providers outside of the [address] [place] [address] since your last visit? Include any pap smears or colon screening.    no

## 2019-09-11 NOTE — PROGRESS NOTES
[facility] [address], [address]  P [phone]  F [phone]    Office Visit    Patient ID:  Name: Yovany Angela  MRN: [id number]  : 1960/59 y.o. Visit date: 2019    LEONID Fried is a 63 y.o.  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 The patient's previous treatment procedures include primary Taxol/carbo with retreatment in  and Doxil/Avastin, topotecan, gemzar and now single agent weekly Taxol initiated 18 following progression with chest wall involvement. In 2018, patient did not follow up and self-discontinued her treatment regimen. She presented to the ER on 18 with SOB. Negative workup for PE or MI. Admitted to Hospitalist service from -18. Managed for acute asthma exacerbation, acute FELICIA, and hyperkalemia. The patient was initiated on Annitta Rocks on 19. Underwent repeat CT C/A/P on 19 for purposes of following response to treatment. Patient was tolerating Annitta Rocks well for the first month, but then had a rise in her creatinine. However, she was also found to have hydronephrosis. Underwent right ureteral stent placement on 3/18/19. Restarted Zejula afterwards, although with continued rise in creatinine to >2. Denies current shortness of breath. Does report some pain in her chest where the mass is located. Denies cough or hemoptysis. Denies nausea, vomiting, constipation or diarrhea. Had some constipation when she started Annitta Rocks, but this has resolved with stool softeners. Denies fevers or chills. Does report that her appetite is a little less. Restarted Zejula at 100mg/daily on 19. Presents today for follow-up and consideration of continuation of Annitta Rocks. Completed 2700 cGy of radiation to her chest wall mass on 19. Tolerating treatment well. Labs appropriate.  Her Ca-125 has bounced around recently. The pain at her chest wall mass is much improved. Otherwise the patient is really without complaints. Denies change in appetite or bowel habits. Denies vaginal bleeding, hematuria, hematochezia, constipation, diarrhea, nausea, vomiting, CP, SOB, fevers, or chills. Just returned from her vacation in [facility] yesterday.       OncTx History:  9/21/12: MARAH/BSO/Cytoreductive surgery on 9/21/12 noted carcinomatosis/omental caking. 10/2012-2/2013: 6 cycles Carbo/Taxol  8/2014-11/2014: 6 Cycles carbo/Taxol  3/2015-5/13/15: 3 cycles Avastin/Doxil until progression  7/2015-8/2015 Weekly Topotecan   12/2015-3/2016: Weekly Topotecan  2/2017-9/2017:  Gemzar D1/D8 Q28 days for 6 cycles  4/2018: CT core needle bx chest wall mass: Metastatic high-grade serous carcinoma (see comment)   General Categorization   Positive for malignancy. 5/8/2018-Present: Taxol  R9,3,16; S/P  Cycle #4  8/14/2018. Patient discontinued treatment secondary to side effects and fatigue. 1/23/2019 - Initiated Erica Apodaca. Held do to rising creatinine. However, she was also found to have hydronephrosis. Underwent right ureteral stent placement on 3/18/19. Imaging Review:   CT C/A/P 2/5/19:  FINDINGS:   THYROID: No nodule. MEDIASTINUM: Mediastinal lymphadenopathy is unchanged. A reference superior  mediastinal lymph node is stable measuring 3.3 cm. SWATI: No mass or lymphadenopathy. THORACIC AORTA: No dissection or aneurysm. MAIN PULMONARY ARTERY: Normal in caliber. TRACHEA/BRONCHI: Patent. ESOPHAGUS: No wall thickening or dilatation. HEART: Normal in size. PLEURA: No effusion or pneumothorax. LUNGS: No nodule, mass, or airspace disease. LIVER: Evaluation of the liver is limited by lack of intravenous contrast. There  is a vague 4.4 cm hypodense lesion in the left hepatic lobe previously measuring  3.0 cm. Lesion in the right hepatic lobe now measures 3.3 cm, previously  measuring 2.7 cm. GALLBLADDER: Unremarkable.   SPLEEN: Central soft tissue abnormality has increased in size, now measuring 4.9  cm, previously measuring 3.1 cm. PANCREAS: Not well evaluated given the massive lymphadenopathy and lack of  intravenous contrast.  ADRENALS: Unremarkable. KIDNEYS: There is no moderate right hydronephrosis. STOMACH: Unremarkable. SMALL BOWEL: No dilatation or wall thickening. COLON: No dilatation or wall thickening. APPENDIX: Not visualized. PERITONEUM: Bulky gastrohepatic, periceliac, portacaval, and mesenteric  lymphadenopathy has increased. There is secondary invasion of the liver. Maximum  dimension in the upper abdomen is currently 10.4 cm, previously 9.3 cm. Elemental soft tissue nodules have increased in size, with a reference 7.0 cm  nodule in the left abdomen previously measuring 5.5 cm. Soft tissue nodule in  the deep pelvis between the bladder and colon has increased in size as well. RETROPERITONEUM: No lymphadenopathy or aortic aneurysm. REPRODUCTIVE ORGANS: The uterus and ovaries are surgically absent. URINARY BLADDER: No mass or calculus. BONES: Soft tissue mass involving the right sternum has not changed. Degenerative changes are seen in the thoracic and lumbar spine. ADDITIONAL COMMENTS: N/A  IMPRESSION  IMPRESSION:  Stable mediastinal lymphadenopathy. Progressive abdominal lymphadenopathy as  described above. Liver lesions have increased in size compared to the prior  exam. Stable right sternal mass. Moderate right hydroureteronephrosis is now  noted which appears to be related to the lymphadenopathy. ROS  Constitutional: no weight loss, fever, night sweats  Respiratory: no cough, shortness of breath, or wheezing  Cardiovascular: no chest pain or dyspnea on exertion. Reports chest wall mass smaller, no associated pain.   Heme: No abnormal bleeding  Gastrointestinal: no abdominal pain, change in bowel habits, or black or bloody stools  Genito-Urinary: no dysuria, trouble voiding, or hematuria  Musculoskeletal: + swelling in ankle - bilateral, mostly only at end of day  Neurological: mild neuropathy  Derm: pigmentation/induration medial left leg. Prior injury from fall. Psych: positive for depression - controlled        PMH:  Past Medical History:   Diagnosis Date    Adverse effect of anesthesia     sleep apnea cpap machine useage     Anemia     Arthritis     DDD low back and knees    Asthma     uses albuterol prn only; none x 6 mos.   Never hospitalized    BRCA negative 2013    Calculus of kidney     Chronic kidney disease     kidney stones    Chronic pain     knee pain bilat    CINV (chemotherapy-induced nausea and vomiting) 2014    Decreased hearing, right     PATIENT STATES THIS IS DUE TO CHEMOTHERAPY    Diabetes ([address] Age 46    IDDM    Environmental allergies     Fibromyalgia     GERD (gastroesophageal reflux disease)     controlled with med    Hx of pulmonary embolus during pregnancy 2015    Hydronephrosis due to obstruction of ureter 3/18/2019    Hypertension     Ill-defined condition     Sepsis  -  SOURCE PORT    Ill-defined condition 2016    chemotherapy     Mass of chest wall 2018    Morbid obesity (HCC)     Murmur, heart     Other and unspecified hyperlipidemia     Ovarian cancer ([address] 2012, 2014    serous, high grade, stage IIIc. s/p chemotx (has port)    Psychiatric disorder     Depression/Anxiety    Rectal bleeding     Thromboembolus ([address]     POST PARTUM; resolved- PELVIS    Thyroid disease     HYPOTHYROID    Unspecified sleep apnea     CPAP, Dr. Mary León     PSH:  Past Surgical History:   Procedure Laterality Date    BREAST SURGERY PROCEDURE UNLISTED      Lymph node in left breast 2017   [place]     X2    HX HYSTERECTOMY  2012    ROULA/BSO, tumor debulking, omentectomy for ovarian cancer    HX ORTHOPAEDIC Right     ankle-FX, R    HX ORTHOPAEDIC Right     FX R ARM    HX UROLOGICAL Right     STENT FOR KIDNEY STONES    HX VASCULAR ACCESS  11/4/2015    right chest- port placed    HX VASCULAR ACCESS Left 2017    TETE CATH     SOC:  Social History     Socioeconomic History    Marital status:      Spouse name: Not on file    Number of children: Not on file    Years of education: Not on file    Highest education level: Not on file   Occupational History    Not on file   Social Needs    Financial resource strain: Not on file    Food insecurity:     Worry: Not on file     Inability: Not on file    Transportation needs:     Medical: Not on file     Non-medical: Not on file   Tobacco Use    Smoking status: Never Smoker    Smokeless tobacco: Never Used   Substance and Sexual Activity    Alcohol use: Yes     Comment: 1 drink per month    Drug use: No    Sexual activity: Yes   Lifestyle    Physical activity:     Days per week: Not on file     Minutes per session: Not on file    Stress: Not on file   Relationships    Social connections:     Talks on phone: Not on file     Gets together: Not on file     Attends Restoration service: Not on file     Active member of club or organization: Not on file     Attends meetings of clubs or organizations: Not on file     Relationship status: Not on file    Intimate partner violence:     Fear of current or ex partner: Not on file     Emotionally abused: Not on file     Physically abused: Not on file     Forced sexual activity: Not on file   Other Topics Concern    Not on file   Social History Narrative    Lives in [facility] alone.  passed away in 5/16 of a heart attack. Has 2 sons. Disability. 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 Enjoys swimming and going to the gym.      Family History  Family History   Problem Relation Age of Onset    Hypertension Mother     Arthritis-osteo Mother     Hypertension Father     Arthritis-osteo Father     Cancer Father         PROSTATE    COPD Father     Hypertension Brother     Elevated Lipids Brother  Arthritis-osteo Brother     Hypertension Brother     Elevated Lipids Brother     Obesity Brother     Cancer Brother         PROSTATE    Anesth Problems Son         DELAYED AWAKENING    Diabetes Maternal Grandmother     Anesth Problems Paternal Grandmother         PATIENT [address], [address] STOPPED DURING SURGERY     Medications: (reviewed)  Current Outpatient Medications on File Prior to Visit   Medication Sig Dispense Refill    fluconazole (DIFLUCAN) 200 mg tablet Take 1 Tab by mouth daily for 14 days. Indications: Urinary Tract Infection due to Candida Albicans Fungus 14 Tab 0    amoxicillin-clavulanate (AUGMENTIN) 500-125 mg per tablet Take 1 Tab by mouth two (2) times a day for 5 days. 10 Tab 0    LEVEMIR FLEXTOUCH U-100 INSULN 100 unit/mL (3 mL) inpn INJECT 30 UNITS IN AM AND 50 UNITS AT NIGHT. INCREASE AS DIRECTED TO  UNITS/DAY. 90 Adjustable Dose Pre-filled Pen Syringe 3    VICTOZA 3-ERNESTO 0.6 mg/0.1 mL (18 mg/3 mL) pnij INJECT 1.8 MG BY SUBCUTANEOUS ROUTE DAILY (WITH LUNCH). 27 Adjustable Dose Pre-filled Pen Syringe 3    DEXILANT 60 mg CpDB capsule (delayed release) TAKE 1 CAPSULE BY MOUTH EVERY DAY 90 Cap 3    fluticasone propionate (FLONASE) 50 mcg/actuation nasal spray 2 SPRAYS IN BOTH NOSTRILS DAILY 32 g 3    omega-3 acid ethyl esters (LOVAZA) 1 gram capsule TAKE 1 CAPSULE BY MOUTH TWICE A  Cap 2    LINZESS 145 mcg cap capsule TAKE 1 CAPSULE BY MOUTH EVERY DAYY 30 Cap 0    levothyroxine (SYNTHROID) 150 mcg tablet TAKE 1 TABLET BY MOUTH EVERY DAY BEFORE BREAKFAST 90 Tab 0    ZEJULA 100 mg cap Take 100 mg by mouth two (2) times a day.  glucose blood VI test strips (TRUE METRIX GLUCOSE TEST STRIP) strip by Does Not Apply route See Admin Instructions. 30 Strip 5    lancets misc by Does Not Apply route daily.  True Metrix lancets- test blood sugar once per day 30 Each 5    Blood-Glucose Meter monitoring kit Check up to tid, as directed 1 Kit 0    insulin detemir U-100 (LEVEMIR) 100 unit/mL injection 20 Units by SubCUTAneous route daily (with lunch).  montelukast (SINGULAIR) 10 mg tablet Take 10 mg by mouth nightly.  ascorbic acid, vitamin C, (VITAMIN C) 1,000 mg tablet Take 1,000 mg by mouth daily.  potassium chloride (KLOR-CON M10) 10 mEq tablet TAKE 1 TAB BY MOUTH TWO (2) DAYS A WEEK. WED AND FRI 90 Tab 1    telmisartan (MICARDIS) 20 mg tablet Take 1 Tab by mouth daily. 90 Tab 3    Liraglutide (VICTOZA) 0.6 mg/0.1 mL (18 mg/3 mL) pnij 1.8 mg by SubCUTAneous route daily (with lunch). 3 Pen 3    clonazePAM (KLONOPIN) 0.5 mg tablet Take 0.5 mg by mouth nightly as needed.  sertraline (ZOLOFT) 50 mg tablet TAKE 1 TABLET BY MOUTH as needed  1    cholecalciferol, VITAMIN D3, (VITAMIN D3) 5,000 unit tab tablet Take 5,000 Units by mouth daily.  furosemide (LASIX) 40 mg tablet Take 40 mg by mouth every other day. 3    ondansetron (ZOFRAN ODT) 4 mg disintegrating tablet Take 1 Tab by mouth every eight (8) hours as needed for Nausea. Indications: Nausea and Vomiting caused by Cancer Drugs 30 Tab 2    EPINEPHrine (EPIPEN) 0.3 mg/0.3 mL injection INJECT 0.3 ML BY INTRAMUSCULAR ROUTE ONCE AS NEEDED FOR UP TO 1 DOSE.  1    fluticasone propionate (FLONASE) 50 mcg/actuation nasal spray       amoxicillin (AMOXIL) 500 mg capsule Prior to dental procedures      oxyCODONE IR (ROXICODONE) 5 mg immediate release tablet Take 1 Tab by mouth every four (4) hours as needed for Pain. Max Daily Amount: 30 mg. 45 Tab 0    azelastine (ASTELIN) 137 mcg (0.1 %) nasal spray 1 Wesley by Both Nostrils route daily as needed. Use in each nostril as directed       albuterol (PROVENTIL VENTOLIN) 2.5 mg /3 mL (0.083 %) nebulizer solution 2.5 mg by Nebulization route every four (4) hours as needed for Wheezing.  SYMBICORT 160-4.5 mcg/actuation HFAA Take 2 Puffs by inhalation two (2) times daily as needed.  2 Inhaler 3     No current facility-administered medications on file prior to visit. Allergies: (reviewed)  Allergies   Allergen Reactions    Carboplatin Other (comments)     Patient developed shortness of breath, felt faint, coughing, skin flushed and \"itchy\". This occurred on the patients 8th treatment.  Iodinated Contrast Media Rash     13 hr pre-medication prior to IV contrast.   Patient has done well with 1 hr pre-medication of (Solu-Medrol) 40mg &  (Benadryl) 50mg.  Lipitor [Atorvastatin] Myalgia    Shellfish Containing Products Hives    Tape [Adhesive] Itching and Other (comments)     \"op site-- clear,thin tape\"--caused blister     Tomato Hives    Olmesartan Other (comments)    Losartan Other (comments)     headaches    Percocet [Oxycodone-Acetaminophen] Other (comments)     Fever; however, current home med       OBJECTIVE  Physical Exam  Visit Vitals  /67 (BP 1 Location: Left arm, BP Patient Position: Sitting)   Pulse 96   Ht 5' 9\" (1.753 m)   Wt 272 lb 6.4 oz (123.6 kg)   LMP 09/07/2011   BMI 40.23 kg/m²     Physical Exam:  General: Alert and oriented. No acute distress. Well-nourished  HEENT: No thyroid enlargment. Neck supple without restrictions. Sclera normal. Normal occular motion. Moist mucous membranes. Lymphatics: No evidence of axillary, cervical, or subclavicular adenopathy. Respiratory: clear to auscultation and percussion to the bases. No CVAT. Chest wall mass is still palpable along the superior sternum at the level of the superior aspect of the breasts, but improved. Normal overlying skin  Cardiovascular: regular rate and rhythm. No murmurs, rubs, or gallops. Gastrointestinal: soft, non-tender, non-distended, no masses or organomegaly. Well-healed incision. Musculoskeletal: normal gait. No joint tenderness, deformity or swelling. No muscular tenderness. Extremities: extremities normal, atraumatic, mild 1+ edema bilaterally. Pelvic: deferred today  Neuro: Grossly intact. Normal gait and movement.  No acute deficit  Skin: No evidence of rashes or skin changes. DATE REVIEW as available:  Lab Results   Component Value Date/Time    WBC 6.4 09/09/2019 10:27 AM    Hemoglobin (POC) 10.5 (L) 03/20/2017 12:15 PM    HGB 7.9 (L) 09/09/2019 10:27 AM    Hematocrit (POC) 31 (L) 03/20/2017 12:15 PM    HCT 25.3 (L) 09/09/2019 10:27 AM    PLATELET 160 18/58/1700 10:27 AM    MCV 98 (H) 09/09/2019 10:27 AM     Lab Results   Component Value Date/Time    ABS. NEUTROPHILS 4.1 09/09/2019 10:27 AM     Lab Results   Component Value Date/Time    Sodium 141 09/09/2019 10:27 AM    Potassium 5.1 09/09/2019 10:27 AM    Chloride 107 (H) 09/09/2019 10:27 AM    CO2 17 (L) 09/09/2019 10:27 AM    Anion gap 8 09/05/2019 04:20 PM    Glucose 120 (H) 09/09/2019 10:27 AM    BUN 30 (H) 09/09/2019 10:27 AM    Creatinine 1.70 (H) 09/09/2019 10:27 AM    BUN/Creatinine ratio 18 09/09/2019 10:27 AM    GFR est AA 38 (L) 09/09/2019 10:27 AM    GFR est non-AA 33 (L) 09/09/2019 10:27 AM    Calcium 9.5 09/09/2019 10:27 AM    Bilirubin, total 0.3 09/09/2019 10:27 AM    AST (SGOT) 42 (H) 09/09/2019 10:27 AM    Alk. phosphatase 97 09/09/2019 10:27 AM    Protein, total 8.0 09/09/2019 10:27 AM    Albumin 4.2 09/09/2019 10:27 AM    Globulin 5.2 (H) 09/05/2019 04:20 PM    A-G Ratio 1.1 (L) 09/09/2019 10:27 AM    ALT (SGPT) 17 09/09/2019 10:27 AM       ECHO 7/17/18  Left ventricle: Systolic function was normal. Ejection fraction was estimated in the range of 55 % to 60 %. There were no regional wall motion  abnormalities. Wall thickness was mildly increased. Left atrium: The atrium was mildly dilated. Mitral valve: There was mild regurgitation. Aortic valve: Leaflets exhibited sclerosis without stenosis. Not well visualized. IMPRESSION AND PLAN:  Ms. Nancy Carvajal is a 63 y.o. female with recurrent, metastatic ovarian cancer. Heavily pre-treated. Lost to follow-up since 9/2018, at which time patient self-discontinued weekly paclitaxel. Initiated [place] As[place] on 1/23/19.  Held after 1 month secondary to rising creatinine. Found to have right hydronephrosis. Right ureteral stent placed 3/18/19 with normalization of creatinine. Restarted Zejula at 100mg/daily on 4/23/19. ECOG 1    Problem List Items Addressed This Visit        Circulatory    Hypertension       Endocrine    Hyperglycemia due to type 2 diabetes mellitus ([address]    Type 2 diabetes mellitus with nephropathy ([address]       Reproductive    Ovarian cancer [facility] - Primary    Relevant Orders    CT CHEST ABD PELV W WO CONT       Respiratory    SHABANA on CPAP       Other    Carcinomatosis (HCC)    Relevant Orders    CT CHEST ABD PELV W WO CONT    Dyslipidemia (high LDL; low HDL)    Port-A-Cath in place    Chest wall mass    Relevant Orders    CT CHEST ABD PELV W WO CONT    On antineoplastic chemotherapy    Relevant Orders    CT CHEST ABD PELV W WO CONT          Reviewed patient's course to date. Tolerating Zejula 100mg/daily. Creatinine stable. Ca-125 fluctuated these last few times, but she overall is feeling well. Labs on 9/9/19, her creatinine and Hgb seemed to have stabalized. Last CT 2/5/19. Given increased pain at the chest wall mass, she was referred to Radiation Oncology and completed 2700 cGy to the sternum on 7/11/19. I have recommended continuation of Zejula at this time, with interval CT C/A/P at her return visit. She is otherwise tolerated treatment well. Again noted that while the patient is BRCA negative, there is some evidence that PARPi may provide some benefit to BRCA wild-type patients. Previously I discussed goals of care, including that any type of treatment at this time is palliative in nature. I have reviewed her chemotherapy history, which is extensive; although the patient has been somewhat non-compliant with treatments with missed treatments \"here and there\". Stressed importance of remaining on treatment.  Previously discussed that palliative therapy at this time has a response rate at best of 5-10% regardless of the type of treatment. Counseled patient regarding code status and advance care directives, which the patient is going to consider. She reports she has a \"living will\", but I am unsure as to her wishes regarding code status. Will plan to continue this dialogue at future visits. All questions and concerns were addressed with the patient and she is comfortable with the plan.     Rebekah Kramer MD

## 2019-09-18 ENCOUNTER — PATIENT OUTREACH (OUTPATIENT)
Dept: INTERNAL MEDICINE CLINIC | Age: 59
End: 2019-09-18

## 2019-09-18 NOTE — PROGRESS NOTES
Patient has graduated from the Complex Case Management  program on 9/18/2019. Goals Addressed This Visit's Progress Chronic Disease  COMPLETED: recurrent, metastatic ovarian cancer 2/21/2019  
- diagnosis of recurrent, metastatic ovarian cancer; chest wall mass positive for malignancy on biopsy - Metastatic high-grade serous carcinoma. Liver lesions. 
- Advance Medical Directive - on file - open to [facility] Case Management; had visit scheduled for today, however nurse did not show. Patient will contact Raman VILLASEÑOR to reschedule. - palliative treatment. started on Zejula 1/23/19. Most recent office visit with Dr. Traci Spring 2/7/19. 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 
- has office visit scheduled with Dr. Mauricio Chambers (Urology) 2/8/19 
- to have cbc, cmp collected (ordered by Dr. Traci Spring) today, however reports no transportation due to her car is in the shop until Monday and she does not have any family members/friends that are able to assist with transportation to/from lab. Patient will contact Dr. Momo Barnett office today to notify of barrier to having labs drawn today and for further recommendations. - patient agreeable to [facility]; will follow every two weeks 3/6/2019  
- had cbc, cmp drawn on 2/22/19 
- plans to have Right cystoscopy and right stent placement, however procedure scheduling is pending cardiac clearance; diagnosis of hydronephrosis due to obstruction of ureter 
- will attend office visit with Cardiology 3/14/19 for pre-op cardiac clearance 3/20/2019 
- s/p right cystoscopy w/ retrogrades and stent placement on 3/18/19 
- pt currently driving, therefore routine NN f/u outreach is deferred at this time to avoid distraction to patient while driving; pt denies immediate questions/concerns 
- NN will f/u with patient later this week 3/26/2019 
- attended office visit with Dr. Amari Davidson on 3/21/19; cbc, cmp, CA-125, mg ordered/collected. Patient to have weekly labs this month while restarting Zejula. 9/18/2019 
- has maintained follow-up with Dr. Amari Davidson; most recent [address] 9/11/19- Last CT 2/5/19, due to increased pain at chest wall mass patient was referred to Radiation Oncology and completed 2700 cGy to the sternum on 7/11/19, provider recommendation to continue Maria Isabel Golas at this time, interval CT C/A/P at her return visit, code status and Advance Directives discussed with patient by Dr. Amari Davidson during office visit with plan (per documentation) to continue this discussion at future visits. Next scheduled appt 10/9/19. Diabetes  COMPLETED: Patient verbalizes understanding of self -management goals of living with Diabetes. 1/8/2019 
- attended office visit with Ophthalmologist today for complete eye exam and reports advised to schedule follow-up with endocrinologist as soon as possible due to concern for uncontrolled blood glucose that \"might be affecting my eyes. \" Patient unable to be scheduled for appt with Endocrinologist until April due to appt availability; reports Ophthalmologist office visit note was being sent to Dr. Rolando Delgado for review 
- monitoring blood sugars twice daily at home (morning and evening); maintaining written record of blood glucose results, however is not at home during this outreach encounter and is unable to review most recent results 
- most recent hemoglobin a1c 11/27/18 - 5.5%. Most recent office visit with Dr. Amos Gomez (Endocrinology) 3/3/17 
- reports blood glucose range of  mg/dL fasting and 130s in the evening 
- scheduled for office visit with Dr. Rolando Delgado 1/15/19 to review/discuss 1/11/2019 
- patient currently driving and unable to review most recent blood glucose readings with this NN; patient will bring home blood glucose record to scheduled office visit with PCP on 1/15/19 
 
2/21/2019 
- checking blood glucose twice daily; does not have blood glucose record available during this outreach encounter. 
- reports fasting blood glucose this morning of 92 mg/dL 
- has office visit scheduled with Dr. Hira Fuller 5/21/19; patient is on wait list for earlier appointment if there is a cancellation 9/18/2019  
- PCP in EMR is noted to be listed as Dr. Gustavo Levin. Patient reports plan to change PCP and is scheduled to establish care with Dr. Gustavo Levin on 10/3/19. 
- appt with Dr. Hira Fuller 5/21/19 cancelled by patient with no reschedule. Reviewed most recent OV date and recommended 6 mo f/u with patient; patient reports being advised that her diabetes could be managed by PCP. Patient will discuss this with Dr. Gutsavo Levin at upcoming appt when she establishes care. Future Appointments: 
Future Appointments Date Time Provider Gabriel Phillip 10/2/2019  9:00 AM [facility] CT 1 [facility]  
10/3/2019  4:00 PM [facility] NURSE [address] [facility]  
10/9/2019 10:00 AM Shiela Shipley MD [address] [place] [address]  
10/31/2019  4:00 PM [facility] NURSE [address] [facility]  
11/6/2019 10:00 AM Shiela Shipley MD [address] [place] [address]  
11/27/2019  4:00  [address] INFUSION NURSE [address] [facility]  
12/4/2019 10:15 AM Shiela Shipley MD [address] [place] [address]  
12/26/2019  4:00 PM [address] INFUSION NURSE [address] [facility]  
12/31/2019 10:15 AM Shiela Shipley MD [address] [place] [address] Last Appointment With Me: 
Visit date not found Last Appointment My Department: 
4/16/2019

## 2019-09-21 LAB
FOLATE SERPL-MCNC: 10.5 NG/ML
IRON SATN MFR SERPL: 23 % (ref 15–55)
IRON SERPL-MCNC: 64 UG/DL (ref 27–159)
SPECIMEN STATUS REPORT, ROLRST: NORMAL
TIBC SERPL-MCNC: 279 UG/DL (ref 250–450)
UIBC SERPL-MCNC: 215 UG/DL (ref 131–425)
VIT B12 SERPL-MCNC: 328 PG/ML (ref 232–1245)

## 2019-09-23 PROBLEM — Z01.810 PRE-OPERATIVE CARDIOVASCULAR EXAMINATION: Status: RESOLVED | Noted: 2019-03-14 | Resolved: 2019-09-23

## 2019-09-25 ENCOUNTER — OFFICE VISIT (OUTPATIENT)
Dept: URGENT CARE | Age: 59
End: 2019-09-25

## 2019-09-25 VITALS
OXYGEN SATURATION: 98 % | HEIGHT: 69 IN | BODY MASS INDEX: 39.4 KG/M2 | WEIGHT: 266 LBS | RESPIRATION RATE: 18 BRPM | SYSTOLIC BLOOD PRESSURE: 133 MMHG | HEART RATE: 110 BPM | DIASTOLIC BLOOD PRESSURE: 67 MMHG | TEMPERATURE: 99.4 F

## 2019-09-25 DIAGNOSIS — J06.9 UPPER RESPIRATORY TRACT INFECTION, UNSPECIFIED TYPE: ICD-10-CM

## 2019-09-25 DIAGNOSIS — R31.9 URINARY TRACT INFECTION WITH HEMATURIA, SITE UNSPECIFIED: Primary | ICD-10-CM

## 2019-09-25 DIAGNOSIS — N39.0 URINARY TRACT INFECTION WITH HEMATURIA, SITE UNSPECIFIED: Primary | ICD-10-CM

## 2019-09-25 LAB
BILIRUB UR QL STRIP: NEGATIVE
GLUCOSE UR-MCNC: NEGATIVE MG/DL
KETONES P FAST UR STRIP-MCNC: NEGATIVE MG/DL
PH UR STRIP: 6 [PH] (ref 4.6–8)
PROT UR QL STRIP: NORMAL
SP GR UR STRIP: 1.02 (ref 1–1.03)
UA UROBILINOGEN AMB POC: NORMAL (ref 0.2–1)
URINALYSIS CLARITY POC: NORMAL
URINALYSIS COLOR POC: NORMAL
URINE BLOOD POC: NORMAL
URINE LEUKOCYTES POC: NORMAL
URINE NITRITES POC: NEGATIVE

## 2019-09-25 RX ORDER — BENZONATATE 200 MG/1
200 CAPSULE ORAL
Qty: 30 CAP | Refills: 0 | Status: SHIPPED | OUTPATIENT
Start: 2019-09-25 | End: 2020-01-13 | Stop reason: ALTCHOICE

## 2019-09-25 RX ORDER — NITROFURANTOIN (MACROCRYSTALS) 100 MG/1
100 CAPSULE ORAL 2 TIMES DAILY
Qty: 14 CAP | Refills: 0 | Status: SHIPPED | OUTPATIENT
Start: 2019-09-25 | End: 2019-10-02

## 2019-09-25 NOTE — PATIENT INSTRUCTIONS
Sore Throat: Care Instructions  Your Care Instructions    Infection by bacteria or a virus causes most sore throats. Cigarette smoke, dry air, air pollution, allergies, and yelling can also cause a sore throat. Sore throats can be painful and annoying. Fortunately, most sore throats go away on their own. If you have a bacterial infection, your doctor may prescribe antibiotics. Follow-up care is a key part of your treatment and safety. Be sure to make and go to all appointments, and call your doctor if you are having problems. It's also a good idea to know your test results and keep a list of the medicines you take. How can you care for yourself at home? · If your doctor prescribed antibiotics, take them as directed. Do not stop taking them just because you feel better. You need to take the full course of antibiotics. · Gargle with warm salt water once an hour to help reduce swelling and relieve discomfort. Use 1 teaspoon of salt mixed in 1 cup of warm water. · Take an over-the-counter pain medicine, such as acetaminophen (Tylenol), ibuprofen (Advil, Motrin), or naproxen (Aleve). Read and follow all instructions on the label. · Be careful when taking over-the-counter cold or flu medicines and Tylenol at the same time. Many of these medicines have acetaminophen, which is Tylenol. Read the labels to make sure that you are not taking more than the recommended dose. Too much acetaminophen (Tylenol) can be harmful. · Drink plenty of fluids. Fluids may help soothe an irritated throat. Hot fluids, such as tea or soup, may help decrease throat pain. · Use over-the-counter throat lozenges to soothe pain. Regular cough drops or hard candy may also help. These should not be given to young children because of the risk of choking. · Do not smoke or allow others to smoke around you. If you need help quitting, talk to your doctor about stop-smoking programs and medicines.  These can increase your chances of quitting for good. · Use a vaporizer or humidifier to add moisture to your bedroom. Follow the directions for cleaning the machine. When should you call for help? Call your doctor now or seek immediate medical care if:    · You have new or worse trouble swallowing.     · Your sore throat gets much worse on one side.    Watch closely for changes in your health, and be sure to contact your doctor if you do not get better as expected. Where can you learn more? Go to http://shani-jasmin.info/. Enter 062 441 80 19 in the search box to learn more about \"Sore Throat: Care Instructions. \"  Current as of: October 21, 2018  Content Version: 12.2  © 8467-6545 [employer]. Care instructions adapted under license by [employer] (which disclaims liability or warranty for this information). If you have questions about a medical condition or this instruction, always ask your healthcare professional. [place] [address] any warranty or liability for your use of this information. Upper Respiratory Infection (Cold): Care Instructions  Your Care Instructions    An upper respiratory infection, or URI, is an infection of the nose, sinuses, or throat. URIs are spread by coughs, sneezes, and direct contact. The common cold is the most frequent kind of URI. The flu and sinus infections are other kinds of URIs. Almost all URIs are caused by viruses. Antibiotics won't cure them. But you can treat most infections with home care. This may include drinking lots of fluids and taking over-the-counter pain medicine. You will probably feel better in 4 to 10 days. The doctor has checked you carefully, but problems can develop later. If you notice any problems or new symptoms, get medical treatment right away. Follow-up care is a key part of your treatment and safety. Be sure to make and go to all appointments, and call your doctor if you are having problems.  It's also a good idea to know your test results and keep a list of the medicines you take. How can you care for yourself at home? · To prevent dehydration, drink plenty of fluids, enough so that your urine is light yellow or clear like water. Choose water and other caffeine-free clear liquids until you feel better. If you have kidney, heart, or liver disease and have to limit fluids, talk with your doctor before you increase the amount of fluids you drink. · Take an over-the-counter pain medicine, such as acetaminophen (Tylenol), ibuprofen (Advil, Motrin), or naproxen (Aleve). Read and follow all instructions on the label. · Before you use cough and cold medicines, check the label. These medicines may not be safe for young children or for people with certain health problems. · Be careful when taking over-the-counter cold or flu medicines and Tylenol at the same time. Many of these medicines have acetaminophen, which is Tylenol. Read the labels to make sure that you are not taking more than the recommended dose. Too much acetaminophen (Tylenol) can be harmful. · Get plenty of rest.  · Do not smoke or allow others to smoke around you. If you need help quitting, talk to your doctor about stop-smoking programs and medicines. These can increase your chances of quitting for good. When should you call for help? Call 911 anytime you think you may need emergency care.  For example, call if:    · You have severe trouble breathing.    Call your doctor now or seek immediate medical care if:    · You seem to be getting much sicker.     · You have new or worse trouble breathing.     · You have a new or higher fever.     · You have a new rash.    Watch closely for changes in your health, and be sure to contact your doctor if:    · You have a new symptom, such as a sore throat, an earache, or sinus pain.     · You cough more deeply or more often, especially if you notice more mucus or a change in the color of your mucus.     · You do not get better as expected. Where can you learn more? Go to http://shani-jasmin.info/. Enter P894 in the search box to learn more about \"Upper Respiratory Infection (Cold): Care Instructions. \"  Current as of: June 9, 2019  Content Version: 12.2  © 5690-1003 [employer]. Care instructions adapted under license by [employer] (which disclaims liability or warranty for this information). If you have questions about a medical condition or this instruction, always ask your healthcare professional. [place] [address] any warranty or liability for your use of this information. Urinary Tract Infection in Women: Care Instructions  Your Care Instructions    A urinary tract infection, or UTI, is a general term for an infection anywhere between the kidneys and the urethra (where urine comes out). Most UTIs are bladder infections. They often cause pain or burning when you urinate. UTIs are caused by bacteria and can be cured with antibiotics. Be sure to complete your treatment so that the infection goes away. Follow-up care is a key part of your treatment and safety. Be sure to make and go to all appointments, and call your doctor if you are having problems. It's also a good idea to know your test results and keep a list of the medicines you take. How can you care for yourself at home? · Take your antibiotics as directed. Do not stop taking them just because you feel better. You need to take the full course of antibiotics. · Drink extra water and other fluids for the next day or two. This may help wash out the bacteria that are causing the infection. (If you have kidney, heart, or liver disease and have to limit fluids, talk with your doctor before you increase your fluid intake.)  · Avoid drinks that are carbonated or have caffeine. They can irritate the bladder. · Urinate often. Try to empty your bladder each time.   · To relieve pain, take a hot bath or lay a heating pad set on low over your lower belly or genital area. Never go to sleep with a heating pad in place. To prevent UTIs  · Drink plenty of water each day. This helps you urinate often, which clears bacteria from your system. (If you have kidney, heart, or liver disease and have to limit fluids, talk with your doctor before you increase your fluid intake.)  · Urinate when you need to. · Urinate right after you have sex. · Change sanitary pads often. · Avoid douches, bubble baths, feminine hygiene sprays, and other feminine hygiene products that have deodorants. · After going to the bathroom, wipe from front to back. When should you call for help? Call your doctor now or seek immediate medical care if:    · Symptoms such as fever, chills, nausea, or vomiting get worse or appear for the first time.     · You have new pain in your back just below your rib cage. This is called flank pain.     · There is new blood or pus in your urine.     · You have any problems with your antibiotic medicine.    Watch closely for changes in your health, and be sure to contact your doctor if:    · You are not getting better after taking an antibiotic for 2 days.     · Your symptoms go away but then come back. Where can you learn more? Go to http://shani-jasmin.info/. Enter M560 in the search box to learn more about \"Urinary Tract Infection in Women: Care Instructions. \"  Current as of: December 19, 2018  Content Version: 12.2  © 8946-0888 [employer]. Care instructions adapted under license by [employer] (which disclaims liability or warranty for this information). If you have questions about a medical condition or this instruction, always ask your healthcare professional. [place] [address] any warranty or liability for your use of this information.          Frequent Urination: Care Instructions  Your Care Instructions  An urge to urinate frequently but usually passing only small amounts of urine is a common symptom of urinary problems, such as urinary tract infections. The bladder may become inflamed. This can cause the urge to urinate. You may try to urinate more often than usual to try to soothe that urge. Frequent urination also may be caused by sexually transmitted infections (STIs) or kidney stones. Or it may happen when something irritates the tube that carries urine from the bladder to the outside of the body (urethra). It may also be a sign of diabetes. The cause may be hard to find. You may need tests. Follow-up care is a key part of your treatment and safety. Be sure to make and go to all appointments, and call your doctor if you are having problems. It's also a good idea to know your test results and keep a list of the medicines you take. How can you care for yourself at home? · Drink extra water for the next day or two. This will help make the urine less concentrated. (If you have kidney, heart, or liver disease and have to limit fluids, talk with your doctor before you increase the amount of fluids you drink.)  · Avoid drinks that are carbonated or have caffeine. They can irritate the bladder. For women:  · Urinate right after you have sex. · After you go to the bathroom, wipe from front to back. · Avoid douches, bubble baths, and feminine hygiene sprays. And avoid other feminine hygiene products that have deodorants. When should you call for help? Call your doctor now or seek immediate medical care if:    · You have new symptoms, such as fever, nausea, or vomiting.     · You have new or worse symptoms of a urinary problem. For example:  ? You have blood or pus in your urine. ? You have chills or body aches. ? It hurts to urinate. ? You have groin or belly pain. ?  You have pain in your back just below your rib cage (the flank area).    Watch closely for changes in your health, and be sure to contact your doctor if you feel thirstier than usual.  Where can you learn more? Go to http://shani-jasmin.info/. Enter 486 8565 in the search box to learn more about \"Frequent Urination: Care Instructions. \"  Current as of: December 19, 2018  Content Version: 12.2  © 6562-8552 [employer]. Care instructions adapted under license by [employer] (which disclaims liability or warranty for this information). If you have questions about a medical condition or this instruction, always ask your healthcare professional. [place] [address] any warranty or liability for your use of this information.

## 2019-09-25 NOTE — PROGRESS NOTES
Cold Symptoms   The history is provided by the patient. This is a new problem. The current episode started 2 days ago. The problem has not changed since onset. The cough is non-productive. There has been no fever. Associated symptoms include ear congestion and rhinorrhea. Pertinent negatives include no chest pain, no chills, no ear pain, no headaches, no sore throat, no myalgias, no shortness of breath, no wheezing, no nausea and no vomiting. Treatments tried: Siddhartha Stone. The treatment provided mild relief. She is not a smoker. Her past medical history is significant for asthma. Patient have a history os ovarian cancer and followed by a oncologist and she tested positive 2 weeks ago for a UTI. She finished her antibiotics last week. Patient states she still have frequency but denies abdominal discomfort or urinary pain. Patient would like to be retested today for a UTI. Patient denies chest pain, SOB or dizziness. Past Medical History:   Diagnosis Date    Adverse effect of anesthesia     sleep apnea cpap machine useage     Anemia     Arthritis     DDD low back and knees    Asthma     uses albuterol prn only; none x 6 mos.   Never hospitalized    BRCA negative 1/2013    Calculus of kidney     Chronic kidney disease     kidney stones    Chronic pain     knee pain bilat    CINV (chemotherapy-induced nausea and vomiting) 8/21/2014    Decreased hearing, right     PATIENT STATES THIS IS DUE TO CHEMOTHERAPY    Diabetes ([facility] [address] Age 46    IDDM    Environmental allergies     Fibromyalgia     GERD (gastroesophageal reflux disease)     controlled with med    Hx of pulmonary embolus during pregnancy 1/18/2015    Hydronephrosis due to obstruction of ureter 3/18/2019    Hypertension     Ill-defined condition     Sepsis 9-2015 -  SOURCE PORT    Ill-defined condition 2016    chemotherapy     Mass of chest wall 05/2018    Morbid obesity (HCC)     Murmur, heart     Other and unspecified hyperlipidemia     Ovarian cancer ([facility] [address] 2012, 2014    serous, high grade, stage IIIc. s/p chemotx (has port)    Psychiatric disorder     Depression/Anxiety    Rectal bleeding     Thromboembolus ([facility] [address]     POST PARTUM; resolved- PELVIS    Thyroid disease     HYPOTHYROID    Unspecified sleep apnea     CPAP, Dr. Bolivar Fang        Past Surgical History:   Procedure Laterality Date    BREAST SURGERY PROCEDURE UNLISTED      Lymph node in left breast 2017   [place]     X2    HX HYSTERECTOMY  2012    ROULA/BSO, tumor debulking, omentectomy for ovarian cancer    HX ORTHOPAEDIC Right     ankle-FX, R    HX ORTHOPAEDIC Right     FX R ARM    HX UROLOGICAL Right     STENT FOR KIDNEY STONES    HX VASCULAR ACCESS  2015    right chest- port placed    HX VASCULAR ACCESS Left 2017    TETE CATH         Family History   Problem Relation Age of Onset    Hypertension Mother     Arthritis-osteo Mother     Hypertension Father     Arthritis-osteo Father     Cancer Father         PROSTATE    COPD Father     Hypertension Brother     Elevated Lipids Brother     Arthritis-osteo Brother     Hypertension Brother     Elevated Lipids Brother     Obesity Brother     Cancer Brother         PROSTATE    Anesth Problems Son         DELAYED AWAKENING    Diabetes Maternal Grandmother     Anesth Problems Paternal Grandmother         [address] STOPPED DURING SURGERY        Social History     Socioeconomic History    Marital status:      Spouse name: Not on file    Number of children: Not on file    Years of education: Not on file    Highest education level: Not on file   Occupational History    Not on file   Social Needs    Financial resource strain: Not on file    Food insecurity:     Worry: Not on file     Inability: Not on file    Transportation needs:     Medical: Not on file     Non-medical: Not on file   Tobacco Use    Smoking status: Never Smoker    Smokeless tobacco: Never Used   Substance and Sexual Activity    Alcohol use: Yes     Comment: 1 drink per month    Drug use: No    Sexual activity: Yes   Lifestyle    Physical activity:     Days per week: Not on file     Minutes per session: Not on file    Stress: Not on file   Relationships    Social connections:     Talks on phone: Not on file     Gets together: Not on file     Attends Hinduism service: Not on file     Active member of club or organization: Not on file     Attends meetings of clubs or organizations: Not on file     Relationship status: Not on file    Intimate partner violence:     Fear of current or ex partner: Not on file     Emotionally abused: Not on file     Physically abused: Not on file     Forced sexual activity: Not on file   Other Topics Concern    Not on file   Social History Narrative    Lives in [place] alone.  passed away in 5/16 of a heart attack. Has 2 sons. Disability. 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 Enjoys swimming and going to the gym. ALLERGIES: Carboplatin; Iodinated contrast media; Lipitor [atorvastatin]; Shellfish containing products; Tape [adhesive]; Tomato; Olmesartan; Losartan; and Percocet [oxycodone-acetaminophen]    Review of Systems   Constitutional: Positive for fatigue. Negative for chills and fever. HENT: Positive for congestion, ear discharge (ear fullness), postnasal drip and rhinorrhea. Negative for ear pain, hearing loss, sinus pressure, sinus pain, sore throat, trouble swallowing and voice change. Eyes: Negative. Respiratory: Negative for cough, chest tightness, shortness of breath and wheezing. Cardiovascular: Negative for chest pain. Gastrointestinal: Negative for nausea and vomiting. Genitourinary: Positive for frequency. Negative for difficulty urinating, dysuria, flank pain, pelvic pain and vaginal pain. Musculoskeletal: Negative. Negative for myalgias.    Skin: Negative. Neurological: Negative for dizziness, syncope, weakness, light-headedness, numbness and headaches. Vitals:    09/25/19 0956   BP: 133/67   Pulse: (!) 110   Resp: 18   Temp: 99.4 °F (37.4 °C)   SpO2: 98%   Weight: 266 lb (120.7 kg)   Height: 5' 9\" (1.753 m)       Physical Exam   Constitutional: She is oriented to person, place, and time. She appears well-developed and well-nourished. HENT:   Right Ear: External ear normal.   Left Ear: External ear normal.   Mouth/Throat: Oropharynx is clear and moist. No oropharyngeal exudate. Postnasal drainage, boggy right nare   Neck: Normal range of motion. Cardiovascular: Normal rate, regular rhythm and normal heart sounds. Pulmonary/Chest: Effort normal and breath sounds normal. No respiratory distress. She has no wheezes. She has no rales. Abdominal: Soft. Bowel sounds are normal. She exhibits no distension. There is no tenderness. There is no rebound. Musculoskeletal: Normal range of motion. Lymphadenopathy:     She has no cervical adenopathy. Neurological: She is alert and oriented to person, place, and time. Skin: Skin is warm and dry. Psychiatric: She has a normal mood and affect. MDM     Differential Diagnosis; Clinical Impression; Plan:     (N39.0,  R31.9) Urinary tract infection with hematuria, site unspecified  (primary encounter diagnosis)  (J06.9) Upper respiratory tract infection, unspecified type  Orders Placed This Encounter      nitrofurantoin (MACRODANTIN) 100 mg capsule          Sig: Take 1 Cap by mouth two (2) times a day for 7 days. Dispense:  14 Cap          Refill:  0      benzonatate (TESSALON) 200 mg capsule          Sig: Take 1 Cap by mouth three (3) times daily as needed for Cough. Dispense:  30 Cap          Refill:  0    Informed patient to notify gyn oncologist to inform she still have UTI and is being treated again. Advised to increase fluids.   Symptoms for URI are viral and advised to continue antihistamine, nasal saline and decongestant. Advised to use humidifier at night. Educated on viral versus bacterial symptoms. Educational information given. The patients condition was discussed with the patient and they understand. The patient is to follow up with PCP. If signs and symptoms become worse the pt is to go to the ER. The patient is to take medications as prescribed. AVS given with patient instructions upon discharge.                   Procedures

## 2019-09-26 ENCOUNTER — TELEPHONE (OUTPATIENT)
Dept: GYNECOLOGY | Age: 59
End: 2019-09-26

## 2019-09-27 ENCOUNTER — TELEPHONE (OUTPATIENT)
Dept: ENDOCRINOLOGY | Age: 59
End: 2019-09-27

## 2019-09-27 RX ORDER — PEN NEEDLE, DIABETIC 31 GX5/16"
NEEDLE, DISPOSABLE MISCELLANEOUS
Qty: 200 PEN NEEDLE | Refills: 11 | Status: ON HOLD | OUTPATIENT
Start: 2019-09-27 | End: 2021-01-01

## 2019-09-27 RX ORDER — PEN NEEDLE, DIABETIC 31 GX5/16"
NEEDLE, DISPOSABLE MISCELLANEOUS
Refills: 11 | OUTPATIENT
Start: 2019-09-27

## 2019-09-27 NOTE — TELEPHONE ENCOUNTER
Please call her pharmacy and tell them to contact patient to find out who is managing her diabetes as she is no longer under my care and I won't be refilling her pen needles.

## 2019-09-28 LAB — BACTERIA UR CULT: NORMAL

## 2019-10-02 ENCOUNTER — HOSPITAL ENCOUNTER (OUTPATIENT)
Dept: CT IMAGING | Age: 59
Discharge: HOME | End: 2019-10-02
Attending: OBSTETRICS & GYNECOLOGY
Payer: MEDICARE

## 2019-10-02 DIAGNOSIS — C56.9 OVARIAN CA (HCC): ICD-10-CM

## 2019-10-02 PROCEDURE — 74176 CT ABD & PELVIS W/O CONTRAST: CPT

## 2019-10-02 PROCEDURE — 71250 CT THORAX DX C-: CPT

## 2019-10-02 RX ORDER — HEPARIN 100 UNIT/ML
500 SYRINGE INTRAVENOUS AS NEEDED
Status: CANCELLED | OUTPATIENT
Start: 2019-10-03

## 2019-10-02 RX ORDER — SODIUM CHLORIDE 9 MG/ML
10 INJECTION INTRAMUSCULAR; INTRAVENOUS; SUBCUTANEOUS AS NEEDED
Status: CANCELLED | OUTPATIENT
Start: 2019-10-03

## 2019-10-02 RX ORDER — SODIUM CHLORIDE 0.9 % (FLUSH) 0.9 %
10 SYRINGE (ML) INJECTION
Status: DISCONTINUED | OUTPATIENT
Start: 2019-10-02 | End: 2019-10-02

## 2019-10-02 RX ORDER — SODIUM CHLORIDE 0.9 % (FLUSH) 0.9 %
5-10 SYRINGE (ML) INJECTION AS NEEDED
Status: CANCELLED | OUTPATIENT
Start: 2019-10-03

## 2019-10-03 ENCOUNTER — HOSPITAL ENCOUNTER (OUTPATIENT)
Dept: INFUSION THERAPY | Age: 59
Discharge: HOME | End: 2019-10-03
Payer: MEDICARE

## 2019-10-03 ENCOUNTER — TELEPHONE (OUTPATIENT)
Dept: GYNECOLOGY | Age: 59
End: 2019-10-03

## 2019-10-03 VITALS
SYSTOLIC BLOOD PRESSURE: 133 MMHG | DIASTOLIC BLOOD PRESSURE: 78 MMHG | RESPIRATION RATE: 18 BRPM | HEART RATE: 86 BPM | TEMPERATURE: 98.3 F

## 2019-10-03 DIAGNOSIS — C56.9 MALIGNANT NEOPLASM OF OVARY, UNSPECIFIED LATERALITY (HCC): Primary | ICD-10-CM

## 2019-10-03 LAB — CANCER AG125 SERPL-ACNC: 129 U/ML (ref 1.5–35)

## 2019-10-03 PROCEDURE — 36591 DRAW BLOOD OFF VENOUS DEVICE: CPT

## 2019-10-03 PROCEDURE — 74011250636 HC RX REV CODE- 250/636: Performed by: OBSTETRICS & GYNECOLOGY

## 2019-10-03 PROCEDURE — 36415 COLL VENOUS BLD VENIPUNCTURE: CPT

## 2019-10-03 PROCEDURE — 77030012965 HC NDL HUBR BBMI -A

## 2019-10-03 PROCEDURE — 86304 IMMUNOASSAY TUMOR CA 125: CPT

## 2019-10-03 RX ORDER — SODIUM CHLORIDE 0.9 % (FLUSH) 0.9 %
5-10 SYRINGE (ML) INJECTION AS NEEDED
Status: DISCONTINUED | OUTPATIENT
Start: 2019-10-03 | End: 2019-10-04 | Stop reason: HOSPADM

## 2019-10-03 RX ORDER — SODIUM CHLORIDE 9 MG/ML
10 INJECTION INTRAMUSCULAR; INTRAVENOUS; SUBCUTANEOUS AS NEEDED
Status: DISCONTINUED | OUTPATIENT
Start: 2019-10-03 | End: 2019-10-04 | Stop reason: HOSPADM

## 2019-10-03 RX ORDER — HEPARIN 100 UNIT/ML
500 SYRINGE INTRAVENOUS AS NEEDED
Status: DISCONTINUED | OUTPATIENT
Start: 2019-10-03 | End: 2019-10-04 | Stop reason: HOSPADM

## 2019-10-03 RX ADMIN — Medication 500 UNITS: at 16:21

## 2019-10-03 RX ADMIN — SODIUM CHLORIDE 10 ML: 9 INJECTION INTRAMUSCULAR; INTRAVENOUS; SUBCUTANEOUS at 16:15

## 2019-10-03 RX ADMIN — Medication 10 ML: at 16:20

## 2019-10-03 NOTE — PROGRESS NOTES
[facility] Short Visit Progress Note    1600 Pt admit to [facility] for Labs/Port Flush ambulatory in stable condition. Assessment completed. No new concerns voiced. Patient Vitals for the past 12 hrs:   Temp Pulse Resp BP   10/03/19 1614 98.3 °F (36.8 °C) 86 18 133/78     Patient was at another MD appt earlier today and had a CBC and CMP drawn there. Pt requests that we only do the CEA today. Port accessed per protocol with positive blood return. Lab drawn, flushed, heparinized and de-accessed per protocol. 1625 Pt tolerated treatment well. D/c home ambulatory in no distress. Pt aware of next appointment scheduled for 10/31/19.

## 2019-10-03 NOTE — TELEPHONE ENCOUNTER
Pt , she states had blood work drawn this morning at [facility] and she is supposed to have her port flushed this afternoon. She states they \"checked my diabetes, thyroid and CBC\".   She is concerned about getting a bill as she has experienced in the past.

## 2019-10-03 NOTE — TELEPHONE ENCOUNTER
Pt states she had CBC with diff/plts, CMP, and thyroid labs this AM with PCP, so she will request only Ca125 with port flush this afternoon at [facility].

## 2019-10-04 ENCOUNTER — PATIENT OUTREACH (OUTPATIENT)
Dept: INTERNAL MEDICINE CLINIC | Age: 59
End: 2019-10-04

## 2019-10-08 ENCOUNTER — TELEPHONE (OUTPATIENT)
Dept: GYNECOLOGY | Age: 59
End: 2019-10-08

## 2019-10-08 NOTE — TELEPHONE ENCOUNTER
Pt states she had CBC, and CMP with Dr. Marjan Landers on 10/3/19, and will have him fax results to our office. Fax # given to pt.

## 2019-10-09 ENCOUNTER — OFFICE VISIT (OUTPATIENT)
Dept: GYNECOLOGY | Age: 59
End: 2019-10-09

## 2019-10-09 VITALS
DIASTOLIC BLOOD PRESSURE: 91 MMHG | HEIGHT: 69 IN | BODY MASS INDEX: 40.91 KG/M2 | WEIGHT: 276.2 LBS | HEART RATE: 81 BPM | SYSTOLIC BLOOD PRESSURE: 134 MMHG

## 2019-10-09 DIAGNOSIS — Z79.899 ON ANTINEOPLASTIC CHEMOTHERAPY: ICD-10-CM

## 2019-10-09 DIAGNOSIS — C80.0 CARCINOMATOSIS (HCC): ICD-10-CM

## 2019-10-09 DIAGNOSIS — I10 BENIGN ESSENTIAL HYPERTENSION: ICD-10-CM

## 2019-10-09 DIAGNOSIS — Z96.0 S/P URETERAL STENT PLACEMENT: ICD-10-CM

## 2019-10-09 DIAGNOSIS — G47.33 OSA ON CPAP: ICD-10-CM

## 2019-10-09 DIAGNOSIS — D64.81 ANEMIA ASSOCIATED WITH CHEMOTHERAPY: ICD-10-CM

## 2019-10-09 DIAGNOSIS — E66.01 MORBID OBESITY (HCC): ICD-10-CM

## 2019-10-09 DIAGNOSIS — R59.0 LYMPHADENOPATHY, MEDIASTINAL: ICD-10-CM

## 2019-10-09 DIAGNOSIS — Z95.828 PORT-A-CATH IN PLACE: ICD-10-CM

## 2019-10-09 DIAGNOSIS — T45.1X5A ANEMIA ASSOCIATED WITH CHEMOTHERAPY: ICD-10-CM

## 2019-10-09 DIAGNOSIS — Z99.89 OSA ON CPAP: ICD-10-CM

## 2019-10-09 DIAGNOSIS — R22.2 CHEST MASS: ICD-10-CM

## 2019-10-09 DIAGNOSIS — C56.9 MALIGNANT NEOPLASM OF OVARY, UNSPECIFIED LATERALITY (HCC): Primary | ICD-10-CM

## 2019-10-09 RX ORDER — ATORVASTATIN CALCIUM 20 MG/1
TABLET, FILM COATED ORAL
Status: ON HOLD | COMMUNITY
Start: 2019-10-07 | End: 2021-01-01

## 2019-10-09 NOTE — PROGRESS NOTES
New medications: Atorvastatin 20 mg daily, and Cipro 500 mg BID for a UTI (was seen at [facility] urgent care). No ER visits since last OV. Per Dr. Gerard Matthew, pt was scheduled for type and cross match at [address] [place] [address] for 10/11/19 at 4:00pm, with X 1 unit prbc transfusion at [place] [address] on 10/14/19 at 8:00am.  Pt stop Niraparib starting today, and have labs repeated 10/16/19 and call for results to restart it on 10/17/19.

## 2019-10-09 NOTE — PROGRESS NOTES
[address] [facility] [address], [address] [facility]  P [phone]  F [phone]    Office Visit    Patient ID:  Name: Jenyn Shaw  MRN: [id number]  : 1960/59 y.o. Visit date: 10/9/2019    LEONID Samson is a 63 y.o.  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 The patient's previous treatment procedures include primary Taxol/carbo with retreatment in  and Doxil/Avastin, topotecan, gemzar and now single agent weekly Taxol initiated 18 following progression with chest wall involvement. In 2018, patient did not follow up and self-discontinued her treatment regimen. She presented to the ER on 18 with SOB. Negative workup for PE or MI. Admitted to Hospitalist service from -18. Managed for acute asthma exacerbation, acute FELICIA, and hyperkalemia. The patient was initiated on John Maryland on 19. Underwent repeat CT C/A/P on 19 for purposes of following response to treatment. Patient was tolerating John Maryland well for the first month, but then had a rise in her creatinine. However, she was also found to have hydronephrosis. Underwent right ureteral stent placement on 3/18/19. Restarted Zejula afterwards, although with continued rise in creatinine to >2. Denies current shortness of breath. Does report some pain in her chest where the mass is located. Denies cough or hemoptysis. Denies nausea, vomiting, constipation or diarrhea. Had some constipation when she started John Maryland, but this has resolved with stool softeners. Denies fevers or chills. Does report that her appetite is a little less. Restarted Zejula at 100mg/daily on 19. Presents today for follow-up and consideration of continuation of John Maryland. Completed 2700 cGy of radiation to her chest wall mass on 19. Tolerating treatment well.  Labs appropriate, although with slightly trending down Hgb to 7.6. Normal iron and B12 labs. Her Ca-125 has bounced around, but has been steadily declining recently. The pain at her chest wall mass is much improved. Otherwise the patient is really without complaints. Denies change in appetite or bowel habits. Denies vaginal bleeding, hematuria, hematochezia, constipation, diarrhea, nausea, vomiting, CP, SOB, fevers, or chills. Just returned from her vacation in [facility] yesterday. CT C/A/P on 10/2/19 with improvement in her disease. Currently being treated for recurrent UTI.       OncTx History:  9/21/12: MARAH/BSO/Cytoreductive surgery on 9/21/12 noted carcinomatosis/omental caking. 10/2012-2/2013: 6 cycles Carbo/Taxol  8/2014-11/2014: 6 Cycles carbo/Taxol  3/2015-5/13/15: 3 cycles Avastin/Doxil until progression  7/2015-8/2015 Weekly Topotecan   12/2015-3/2016: Weekly Topotecan  2/2017-9/2017:  Gemzar D1/D8 Q28 days for 6 cycles  4/2018: CT core needle bx chest wall mass: Metastatic high-grade serous carcinoma (see comment)   General Categorization   Positive for malignancy. 5/8/2018-Present: Taxol  E3,3,25; S/P  Cycle #4  8/14/2018. Patient discontinued treatment secondary to side effects and fatigue. 1/23/2019 - Initiated Carsonleny Loving. Held do to rising creatinine. However, she was also found to have hydronephrosis. Underwent right ureteral stent placement on 3/18/19. Imaging Review:   CT C/A/P 10/2/19:   FINDINGS:     THYROID: Visualized gland is unremarkable. MEDIASTINUM: Upper left paratracheal node measures 2.2 x 3.4 cm (2, 8),  previously 2.3 x 3.5 cm. Superior prevascular node measures 2.9 x 3.0 cm,  previously 3.5 x 3.8 cm. Poorly delineated subcarinal node measures about 3.4 x  4.8 cm, previously 3.6 x 4.8 cm. Precardiac nodes measure 1.9 x 2.5 cm and 1.7 x  2.4 cm (2, 39), previously 2.1 x 2.7 cm and 2.0 x 2.0 cm. SWATI: No mass or lymphadenopathy. THORACIC AORTA: No dissection or aneurysm.   MAIN PULMONARY ARTERY: Normal in caliber. TRACHEA/BRONCHI: Patent. ESOPHAGUS: No wall thickening or dilatation. HEART: Normal in size. PLEURA: No effusion or pneumothorax. LUNGS: No nodule, mass, or airspace disease. LIVER: Multiple hypodense lesions suspicious for metastases, measuring up to 2.5  x 4.0 cm in segment 2 (2, 43) series, previously 2.8 x 3.7 cm, 3.2 x 4.6 cm in  segment 3 (2, 55), previously 3.1 x 4.5 cm, and partially exophytic from the  posterior inferior right lobe segment 6 measuring 2.5 x 3.5 cm, previously 2.8 x  3.4 cm. GALLBLADDER: Unremarkable. SPLEEN: Central 1.9 x 3.1 cm hypodensity (2, 47), previously 1.9 x 2.3 cm. Otherwise unremarkable. PANCREAS: Inseparable from bulky central abdominal adenopathy with no identified  ductal dilation. ADRENALS: Unremarkable. KIDNEYS: Right hydronephrosis has improved status post right nephroureteral  stent placement. Kidneys otherwise appear unremarkable. STOMACH: Unremarkable. SMALL BOWEL: No dilatation or wall thickening. COLON: No dilation or wall thickening. APPENDIX: Unremarkable. PERITONEUM: Extensive peritoneal implants with representative largest lesions  measuring 4.4 x 6.1 cm and the left adnexal region (2, 98), previously 4.3 x 5.7  cm, 3.8 x 5.9 cm in the anterior midline (2, 81), previously 3.4 x 5.4 cm, and  2.8 x 4.7 cm in the right abdomen (2, 78), previously 3.2 x 4.7 cm. RETROPERITONEUM: Extensive rocio hepatis, retroperitoneal, and pelvic sidewall  adenopathy. Conglomerate nkechi mass in the rocio hepatis region measures 5.7 x  11.1 cm (2, 58) is, previously 6.3 x 12.2 cm. Aortocaval node measures 3.7 x 5.3  cm (2, 77), previously 4.5 x 5.4 cm. Right pelvic sidewall node measures 2.9 x  4.2 cm (2, 103), previously 3.4 x 4.2 cm. Left external iliac node measures 3.0  x 4.8 cm (2, 101), previously 3.3 x 4.8 cm. REPRODUCTIVE ORGANS: Uterus and ovaries are surgically absent. URINARY BLADDER: No mass or calculus.  Distal end of right nephroureteral stent  positioned within bladder lumen. BONES: Degenerative spine change. No acute fracture or aggressive lesion. ADDITIONAL COMMENTS: Mass centered at the right sternocostal junction measures  5.3 x 5.4 cm (2, 21), previously 4.8 x 5.7 cm. Post surgical changes in the  anterior abdominal wall. Left Port-A-Cath in place. Bilateral inguinal  adenopathy measuring 2.1 x 3.2 cm on the right (2, 115), previously 2.3 x 3.7 cm  and 2.7 x 2.8 cm on the left (2, 108), previously 2.9 x 3.1 cm. .  IMPRESSION  IMPRESSION: Overall response to disease with either decrease in size or no  significant change in extensive nkechi metastases, hepatic metastases, right  sternal metastasis, and peritoneal carcinomatosis as above. No new metastasis or  progressive metastasis identified. Status post right nephroureteral stent with  interval resolution of right hydronephrosis. CT C/A/P 2/5/19:  FINDINGS:   THYROID: No nodule. MEDIASTINUM: Mediastinal lymphadenopathy is unchanged. A reference superior  mediastinal lymph node is stable measuring 3.3 cm. SWATI: No mass or lymphadenopathy. THORACIC AORTA: No dissection or aneurysm. MAIN PULMONARY ARTERY: Normal in caliber. TRACHEA/BRONCHI: Patent. ESOPHAGUS: No wall thickening or dilatation. HEART: Normal in size. PLEURA: No effusion or pneumothorax. LUNGS: No nodule, mass, or airspace disease. LIVER: Evaluation of the liver is limited by lack of intravenous contrast. There  is a vague 4.4 cm hypodense lesion in the left hepatic lobe previously measuring  3.0 cm. Lesion in the right hepatic lobe now measures 3.3 cm, previously  measuring 2.7 cm. GALLBLADDER: Unremarkable. SPLEEN: Central soft tissue abnormality has increased in size, now measuring 4.9  cm, previously measuring 3.1 cm. PANCREAS: Not well evaluated given the massive lymphadenopathy and lack of  intravenous contrast.  ADRENALS: Unremarkable. KIDNEYS: There is no moderate right hydronephrosis.   STOMACH: Unremarkable. SMALL BOWEL: No dilatation or wall thickening. COLON: No dilatation or wall thickening. APPENDIX: Not visualized. PERITONEUM: Bulky gastrohepatic, periceliac, portacaval, and mesenteric  lymphadenopathy has increased. There is secondary invasion of the liver. Maximum  dimension in the upper abdomen is currently 10.4 cm, previously 9.3 cm. Elemental soft tissue nodules have increased in size, with a reference 7.0 cm  nodule in the left abdomen previously measuring 5.5 cm. Soft tissue nodule in  the deep pelvis between the bladder and colon has increased in size as well. RETROPERITONEUM: No lymphadenopathy or aortic aneurysm. REPRODUCTIVE ORGANS: The uterus and ovaries are surgically absent. URINARY BLADDER: No mass or calculus. BONES: Soft tissue mass involving the right sternum has not changed. Degenerative changes are seen in the thoracic and lumbar spine. ADDITIONAL COMMENTS: N/A  IMPRESSION  IMPRESSION:  Stable mediastinal lymphadenopathy. Progressive abdominal lymphadenopathy as  described above. Liver lesions have increased in size compared to the prior  exam. Stable right sternal mass. Moderate right hydroureteronephrosis is now  noted which appears to be related to the lymphadenopathy. ROS  Constitutional: no weight loss, fever, night sweats  Respiratory: no cough, shortness of breath, or wheezing  Cardiovascular: no chest pain or dyspnea on exertion. Reports chest wall mass smaller, no associated pain. Heme: No abnormal bleeding  Gastrointestinal: no abdominal pain, change in bowel habits, or black or bloody stools  Genito-Urinary: no dysuria, trouble voiding, or hematuria  Musculoskeletal: + swelling in ankle - bilateral, mostly only at end of day  Neurological: mild neuropathy  Derm: pigmentation/induration medial left leg. Prior injury from fall.     Psych: positive for depression - controlled        PMH:  Past Medical History:   Diagnosis Date    Adverse effect of anesthesia sleep apnea cpap machine useage     Anemia     Arthritis     DDD low back and knees    Asthma     uses albuterol prn only; none x 6 mos.   Never hospitalized    BRCA negative 2013    Calculus of kidney     Chronic kidney disease     kidney stones    Chronic pain     knee pain bilat    CINV (chemotherapy-induced nausea and vomiting) 2014    Decreased hearing, right     PATIENT STATES THIS IS DUE TO CHEMOTHERAPY    Diabetes ([facility] [address] Age 46    IDDM    Environmental allergies     Fibromyalgia     GERD (gastroesophageal reflux disease)     controlled with med    Hx of pulmonary embolus during pregnancy 2015    Hydronephrosis due to obstruction of ureter 3/18/2019    Hypertension     Ill-defined condition     Sepsis  -  SOURCE PORT    Ill-defined condition 2016    chemotherapy     Mass of chest wall 2018    Morbid obesity (HCC)     Murmur, heart     Other and unspecified hyperlipidemia     Ovarian cancer ([address] 2012, 2014    serous, high grade, stage IIIc. s/p chemotx (has port)    Psychiatric disorder     Depression/Anxiety    Rectal bleeding     Thromboembolus ([address]     POST PARTUM; resolved- PELVIS    Thyroid disease     HYPOTHYROID    Unspecified sleep apnea     CPAP, Dr. Chyrl Runner     PSH:  Past Surgical History:   Procedure Laterality Date    BREAST SURGERY PROCEDURE UNLISTED      Lymph node in left breast 2017   Winkelman     X2    HX HYSTERECTOMY  2012    ROULA/BSO, tumor debulking, omentectomy for ovarian cancer    HX ORTHOPAEDIC Right     ankle-FX, R    HX ORTHOPAEDIC Right     FX R ARM    HX UROLOGICAL Right     STENT FOR KIDNEY STONES    HX VASCULAR ACCESS  2015    right chest- port placed    HX VASCULAR ACCESS Left 2017    TETE CATH     SOC:  Social History     Socioeconomic History    Marital status:      Spouse name: Not on file    Number of children: Not on file    Years of education: Not on file  Highest education level: Not on file   Occupational History    Not on file   Social Needs    Financial resource strain: Not on file    Food insecurity:     Worry: Not on file     Inability: Not on file    Transportation needs:     Medical: Not on file     Non-medical: Not on file   Tobacco Use    Smoking status: Never Smoker    Smokeless tobacco: Never Used   Substance and Sexual Activity    Alcohol use: Yes     Comment: 1 drink per month    Drug use: No    Sexual activity: Yes   Lifestyle    Physical activity:     Days per week: Not on file     Minutes per session: Not on file    Stress: Not on file   Relationships    Social connections:     Talks on phone: Not on file     Gets together: Not on file     Attends Confucianism service: Not on file     Active member of club or organization: Not on file     Attends meetings of clubs or organizations: Not on file     Relationship status: Not on file    Intimate partner violence:     Fear of current or ex partner: Not on file     Emotionally abused: Not on file     Physically abused: Not on file     Forced sexual activity: Not on file   Other Topics Concern    Not on file   Social History Narrative    Lives in [facility] alone.  passed away in 5/16 of a heart attack. Has 2 sons. Disability. 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 Enjoys swimming and going to the gym.      Family History  Family History   Problem Relation Age of Onset    Hypertension Mother     Arthritis-osteo Mother     Hypertension Father     Arthritis-osteo Father     Cancer Father         PROSTATE    COPD Father     Hypertension Brother     Elevated Lipids Brother     Arthritis-osteo Brother     Hypertension Brother     Elevated Lipids Brother     Obesity Brother     Cancer Brother         PROSTATE    Anesth Problems Son         DELAYED AWAKENING    Diabetes Maternal Grandmother     Anesth Problems Paternal Grandmother         PATIENT STATES GRANDMOTHER'S HEART STOPPED DURING SURGERY     Medications: (reviewed)  Current Outpatient Medications on File Prior to Visit   Medication Sig Dispense Refill    BD ULTRA-FINE SHORT PEN NEEDLE 31 gauge x 5/16\" ndle USE TO INJECT INSULIN 5 TIMES DAILY 200 Pen Needle 11    benzonatate (TESSALON) 200 mg capsule Take 1 Cap by mouth three (3) times daily as needed for Cough. 30 Cap 0    insulin aspart U-100 (NOVOLOG FLEXPEN U-100 INSULIN) 100 unit/mL (3 mL) inpn INJECT 20 UNITS BEFORE EACH MEAL + 4 UNITS FOR EVERY 50 MG/DL ABOVE 150 MG/DL.  UNITS PER DAY 90 Adjustable Dose Pre-filled Pen Syringe 3    LEVEMIR FLEXTOUCH U-100 INSULN 100 unit/mL (3 mL) inpn INJECT 30 UNITS IN AM AND 50 UNITS AT NIGHT. INCREASE AS DIRECTED TO  UNITS/DAY. 90 Adjustable Dose Pre-filled Pen Syringe 3    VICTOZA 3-ERNESTO 0.6 mg/0.1 mL (18 mg/3 mL) pnij INJECT 1.8 MG BY SUBCUTANEOUS ROUTE DAILY (WITH LUNCH). 27 Adjustable Dose Pre-filled Pen Syringe 3    DEXILANT 60 mg CpDB capsule (delayed release) TAKE 1 CAPSULE BY MOUTH EVERY DAY 90 Cap 3    fluticasone propionate (FLONASE) 50 mcg/actuation nasal spray 2 SPRAYS IN BOTH NOSTRILS DAILY 32 g 3    omega-3 acid ethyl esters (LOVAZA) 1 gram capsule TAKE 1 CAPSULE BY MOUTH TWICE A  Cap 2    LINZESS 145 mcg cap capsule TAKE 1 CAPSULE BY MOUTH EVERY DAYY 30 Cap 0    ondansetron (ZOFRAN ODT) 4 mg disintegrating tablet Take 1 Tab by mouth every eight (8) hours as needed for Nausea. Indications: Nausea and Vomiting caused by Cancer Drugs 30 Tab 2    levothyroxine (SYNTHROID) 150 mcg tablet TAKE 1 TABLET BY MOUTH EVERY DAY BEFORE BREAKFAST 90 Tab 0    EPINEPHrine (EPIPEN) 0.3 mg/0.3 mL injection INJECT 0.3 ML BY INTRAMUSCULAR ROUTE ONCE AS NEEDED FOR UP TO 1 DOSE.  1    ZEJULA 100 mg cap Take 100 mg by mouth two (2) times a day.       fluticasone propionate (FLONASE) 50 mcg/actuation nasal spray       amoxicillin (AMOXIL) 500 mg capsule Prior to dental procedures      oxyCODONE IR (ROXICODONE) 5 mg immediate release tablet Take 1 Tab by mouth every four (4) hours as needed for Pain. Max Daily Amount: 30 mg. 45 Tab 0    glucose blood VI test strips (TRUE METRIX GLUCOSE TEST STRIP) strip by Does Not Apply route See Admin Instructions. 30 Strip 5    lancets misc by Does Not Apply route daily. True Metrix lancets- test blood sugar once per day 30 Each 5    Blood-Glucose Meter monitoring kit Check up to tid, as directed 1 Kit 0    azelastine (ASTELIN) 137 mcg (0.1 %) nasal spray 1 [place] by Both Nostrils route daily as needed. Use in each nostril as directed       insulin detemir U-100 (LEVEMIR) 100 unit/mL injection 20 Units by SubCUTAneous route daily (with lunch).  montelukast (SINGULAIR) 10 mg tablet Take 10 mg by mouth nightly.  ascorbic acid, vitamin C, (VITAMIN C) 1,000 mg tablet Take 1,000 mg by mouth daily.  albuterol (PROVENTIL VENTOLIN) 2.5 mg /3 mL (0.083 %) nebulizer solution 2.5 mg by Nebulization route every four (4) hours as needed for Wheezing.  potassium chloride (KLOR-CON M10) 10 mEq tablet TAKE 1 TAB BY MOUTH TWO (2) DAYS A WEEK. WED AND FRI 90 Tab 1    telmisartan (MICARDIS) 20 mg tablet Take 1 Tab by mouth daily. 90 Tab 3    Liraglutide (VICTOZA) 0.6 mg/0.1 mL (18 mg/3 mL) pnij 1.8 mg by SubCUTAneous route daily (with lunch). 3 Pen 3    SYMBICORT 160-4.5 mcg/actuation HFAA Take 2 Puffs by inhalation two (2) times daily as needed. 2 Inhaler 3    clonazePAM (KLONOPIN) 0.5 mg tablet Take 0.5 mg by mouth nightly as needed.  sertraline (ZOLOFT) 50 mg tablet TAKE 1 TABLET BY MOUTH as needed  1    cholecalciferol, VITAMIN D3, (VITAMIN D3) 5,000 unit tab tablet Take 5,000 Units by mouth daily.  furosemide (LASIX) 40 mg tablet Take 40 mg by mouth every other day. 3    atorvastatin (LIPITOR) 20 mg tablet        No current facility-administered medications on file prior to visit.       Allergies: (reviewed)  Allergies   Allergen Reactions    Carboplatin Other (comments)     Patient developed shortness of breath, felt faint, coughing, skin flushed and \"itchy\". This occurred on the patients 8th treatment.  Iodinated Contrast Media Rash     13 hr pre-medication prior to IV contrast.   Patient has done well with 1 hr pre-medication of (Solu-Medrol) 40mg &  (Benadryl) 50mg.  Lipitor [Atorvastatin] Myalgia    Shellfish Containing Products Hives    Tape [Adhesive] Itching and Other (comments)     \"op site-- clear,thin tape\"--caused blister     Tomato Hives    Olmesartan Other (comments)    Losartan Other (comments)     headaches    Percocet [Oxycodone-Acetaminophen] Other (comments)     Fever; however, current home med       OBJECTIVE  Physical Exam  Visit Vitals  BP (!) 134/91 (BP 1 Location: Left arm, BP Patient Position: Sitting)   Pulse 81   Ht 5' 9\" (1.753 m)   Wt 276 lb 3.2 oz (125.3 kg)   LMP 09/07/2011   BMI 40.79 kg/m²     Physical Exam:  General: Alert and oriented. No acute distress. Well-nourished  HEENT: No thyroid enlargment. Neck supple without restrictions. Sclera normal. Normal occular motion. Moist mucous membranes. Lymphatics: No evidence of axillary, cervical, or subclavicular adenopathy. Respiratory: clear to auscultation and percussion to the bases. No CVAT. Chest wall mass is still palpable along the superior sternum at the level of the superior aspect of the breasts, but improved. Normal overlying skin  Cardiovascular: regular rate and rhythm. No murmurs, rubs, or gallops. Gastrointestinal: soft, non-tender, non-distended, no masses or organomegaly. Well-healed incision. Musculoskeletal: normal gait. No joint tenderness, deformity or swelling. No muscular tenderness. Extremities: extremities normal, atraumatic, mild 1+ edema bilaterally. Pelvic: deferred today  Neuro: Grossly intact. Normal gait and movement. No acute deficit  Skin: No evidence of rashes or skin changes.      DATE REVIEW as available:  Lab Results   Component Value Date/Time    WBC 6.4 09/09/2019 10:27 AM    Hemoglobin (POC) 10.5 (L) 03/20/2017 12:15 PM    HGB 7.9 (L) 09/09/2019 10:27 AM    Hematocrit (POC) 31 (L) 03/20/2017 12:15 PM    HCT 25.3 (L) 09/09/2019 10:27 AM    PLATELET 149 98/17/3550 10:27 AM    MCV 98 (H) 09/09/2019 10:27 AM     Lab Results   Component Value Date/Time    ABS. NEUTROPHILS 4.1 09/09/2019 10:27 AM     Lab Results   Component Value Date/Time    Sodium 141 09/09/2019 10:27 AM    Potassium 5.1 09/09/2019 10:27 AM    Chloride 107 (H) 09/09/2019 10:27 AM    CO2 17 (L) 09/09/2019 10:27 AM    Anion gap 8 09/05/2019 04:20 PM    Glucose 120 (H) 09/09/2019 10:27 AM    BUN 30 (H) 09/09/2019 10:27 AM    Creatinine 1.70 (H) 09/09/2019 10:27 AM    BUN/Creatinine ratio 18 09/09/2019 10:27 AM    GFR est AA 38 (L) 09/09/2019 10:27 AM    GFR est non-AA 33 (L) 09/09/2019 10:27 AM    Calcium 9.5 09/09/2019 10:27 AM    Bilirubin, total 0.3 09/09/2019 10:27 AM    AST (SGOT) 42 (H) 09/09/2019 10:27 AM    Alk. phosphatase 97 09/09/2019 10:27 AM    Protein, total 8.0 09/09/2019 10:27 AM    Albumin 4.2 09/09/2019 10:27 AM    Globulin 5.2 (H) 09/05/2019 04:20 PM    A-G Ratio 1.1 (L) 09/09/2019 10:27 AM    ALT (SGPT) 17 09/09/2019 10:27 AM       ECHO 7/17/18  Left ventricle: Systolic function was normal. Ejection fraction was estimated in the range of 55 % to 60 %. There were no regional wall motion  abnormalities. Wall thickness was mildly increased. Left atrium: The atrium was mildly dilated. Mitral valve: There was mild regurgitation. Aortic valve: Leaflets exhibited sclerosis without stenosis. Not well visualized. IMPRESSION AND PLAN:  Ms. Stefan Alfred is a 63 y.o. female with recurrent, metastatic ovarian cancer. Heavily pre-treated. Lost to follow-up since 9/2018, at which time patient self-discontinued weekly paclitaxel. Initiated Nirmala Marquis on 1/23/19. Held after 1 month secondary to rising creatinine.  Found to have right hydronephrosis. Right ureteral stent placed 3/18/19 with normalization of creatinine. Completed 2700 cGy to the sternun 7/11/19. Restarted Zejula at 100mg/daily on 4/23/19. ECOG 1    Problem List Items Addressed This Visit        Circulatory    Benign essential hypertension    Relevant Medications    atorvastatin (LIPITOR) 20 mg tablet       Immune    Lymphadenopathy, mediastinal       Reproductive    Ovarian cancer ([address] - Primary       Respiratory    SHABANA on CPAP       Other    Carcinomatosis ([address]    Morbid obesity ([address]    Port-A-Cath in place    Chest mass    On antineoplastic chemotherapy    S/P ureteral stent placement    Anemia associated with chemotherapy          Reviewed patient's course to date. Tolerating Zejula 100mg/daily. Creatinine stable. Ca-125 trending down. CT C/A/P on 10/2/19 with improvement and stability of disease burden. Given continued anemia, now with Hgb of 7.6 we will hold for one week and transfuse 1 unit PRBC given patient is symptomatic and reports occasional dizziness. Will recheck lab work next week and likely restart Zejula at that time. Normal iron and B12 studies. Again noted that while the patient is BRCA negative, there is some evidence that PARPi may provide some benefit to BRCA wild-type patients. Previously I discussed goals of care, including that any type of treatment at this time is palliative in nature. I have reviewed her chemotherapy history, which is extensive; although the patient has been somewhat non-compliant with treatments with missed treatments \"here and there\". Stressed importance of remaining on treatment. Previously discussed that palliative therapy at this time has a response rate at best of 5-10% regardless of the type of treatment. Counseled patient regarding code status and advance care directives, which the patient is going to consider. She reports she has a \"living will\", but I am unsure as to her wishes regarding code status.  Will plan to continue this dialogue at future visits. All questions and concerns were addressed with the patient and she is comfortable with the plan.     Kiley Hurtado MD

## 2019-10-15 ENCOUNTER — TELEPHONE (OUTPATIENT)
Dept: GYNECOLOGY | Age: 59
End: 2019-10-15

## 2019-10-15 NOTE — TELEPHONE ENCOUNTER
Pt given appts for type and cross match for 10/18/19 at 2:00pm at [facility], and X1 unit prbc on 10/21/19 at 9:00 at [address] appts are in [place], and [place]. Consent signed and scanned into CC.   Dr. Kerrie Augustine to sign new orders for 10/21/19

## 2019-10-16 RX ORDER — SODIUM CHLORIDE 9 MG/ML
10 INJECTION INTRAMUSCULAR; INTRAVENOUS; SUBCUTANEOUS AS NEEDED
Status: CANCELLED | OUTPATIENT
Start: 2019-10-18

## 2019-10-16 RX ORDER — SODIUM CHLORIDE 0.9 % (FLUSH) 0.9 %
5-10 SYRINGE (ML) INJECTION AS NEEDED
Status: CANCELLED | OUTPATIENT
Start: 2019-10-18

## 2019-10-16 RX ORDER — HEPARIN 100 UNIT/ML
500 SYRINGE INTRAVENOUS AS NEEDED
Status: CANCELLED | OUTPATIENT
Start: 2019-10-18

## 2019-10-18 ENCOUNTER — HOSPITAL ENCOUNTER (OUTPATIENT)
Dept: INFUSION THERAPY | Age: 59
Discharge: HOME | End: 2019-10-18
Payer: MEDICARE

## 2019-10-18 VITALS
TEMPERATURE: 98.3 F | RESPIRATION RATE: 18 BRPM | HEART RATE: 88 BPM | SYSTOLIC BLOOD PRESSURE: 109 MMHG | DIASTOLIC BLOOD PRESSURE: 63 MMHG

## 2019-10-18 DIAGNOSIS — C56.9 MALIGNANT NEOPLASM OF OVARY, UNSPECIFIED LATERALITY (HCC): Primary | ICD-10-CM

## 2019-10-18 LAB
ALBUMIN SERPL-MCNC: 3.4 G/DL (ref 3.5–5)
ALBUMIN/GLOB SERPL: 0.7 {RATIO} (ref 1.1–2.2)
ALP SERPL-CCNC: 84 U/L (ref 45–117)
ALT SERPL-CCNC: 22 U/L (ref 12–78)
ANION GAP SERPL CALC-SCNC: 6 MMOL/L (ref 5–15)
AST SERPL-CCNC: 32 U/L (ref 15–37)
BASOPHILS # BLD: 0 K/UL (ref 0–0.1)
BASOPHILS NFR BLD: 0 % (ref 0–1)
BILIRUB SERPL-MCNC: 0.3 MG/DL (ref 0.2–1)
BUN SERPL-MCNC: 25 MG/DL (ref 6–20)
BUN/CREAT SERPL: 20 (ref 12–20)
CALCIUM SERPL-MCNC: 9.3 MG/DL (ref 8.5–10.1)
CANCER AG125 SERPL-ACNC: 124 U/ML (ref 1.5–35)
CHLORIDE SERPL-SCNC: 115 MMOL/L (ref 97–108)
CO2 SERPL-SCNC: 23 MMOL/L (ref 21–32)
CREAT SERPL-MCNC: 1.28 MG/DL (ref 0.55–1.02)
DIFFERENTIAL METHOD BLD: ABNORMAL
EOSINOPHIL # BLD: 0.3 K/UL (ref 0–0.4)
EOSINOPHIL NFR BLD: 5 % (ref 0–7)
ERYTHROCYTE [DISTWIDTH] IN BLOOD BY AUTOMATED COUNT: 13.3 % (ref 11.5–14.5)
GLOBULIN SER CALC-MCNC: 4.8 G/DL (ref 2–4)
GLUCOSE SERPL-MCNC: 92 MG/DL (ref 65–100)
HCT VFR BLD AUTO: 24.7 % (ref 35–47)
HGB BLD-MCNC: 7.4 G/DL (ref 11.5–16)
IMM GRANULOCYTES # BLD AUTO: 0 K/UL (ref 0–0.04)
IMM GRANULOCYTES NFR BLD AUTO: 0 % (ref 0–0.5)
LYMPHOCYTES # BLD: 1.2 K/UL (ref 0.8–3.5)
LYMPHOCYTES NFR BLD: 18 % (ref 12–49)
MCH RBC QN AUTO: 30.2 PG (ref 26–34)
MCHC RBC AUTO-ENTMCNC: 30 G/DL (ref 30–36.5)
MCV RBC AUTO: 100.8 FL (ref 80–99)
MONOCYTES # BLD: 0.7 K/UL (ref 0–1)
MONOCYTES NFR BLD: 12 % (ref 5–13)
NEUTS SEG # BLD: 4 K/UL (ref 1.8–8)
NEUTS SEG NFR BLD: 65 % (ref 32–75)
NRBC # BLD: 0 K/UL (ref 0–0.01)
NRBC BLD-RTO: 0 PER 100 WBC
PLATELET # BLD AUTO: 179 K/UL (ref 150–400)
PMV BLD AUTO: 9.4 FL (ref 8.9–12.9)
POTASSIUM SERPL-SCNC: 4.4 MMOL/L (ref 3.5–5.1)
PROT SERPL-MCNC: 8.2 G/DL (ref 6.4–8.2)
RBC # BLD AUTO: 2.45 M/UL (ref 3.8–5.2)
SODIUM SERPL-SCNC: 144 MMOL/L (ref 136–145)
WBC # BLD AUTO: 6.3 K/UL (ref 3.6–11)

## 2019-10-18 PROCEDURE — 86923 COMPATIBILITY TEST ELECTRIC: CPT

## 2019-10-18 PROCEDURE — 36591 DRAW BLOOD OFF VENOUS DEVICE: CPT

## 2019-10-18 PROCEDURE — 86304 IMMUNOASSAY TUMOR CA 125: CPT

## 2019-10-18 PROCEDURE — 85025 COMPLETE CBC W/AUTO DIFF WBC: CPT

## 2019-10-18 PROCEDURE — 77030012965 HC NDL HUBR BBMI -A

## 2019-10-18 PROCEDURE — 36415 COLL VENOUS BLD VENIPUNCTURE: CPT

## 2019-10-18 PROCEDURE — 80053 COMPREHEN METABOLIC PANEL: CPT

## 2019-10-18 PROCEDURE — 86900 BLOOD TYPING SEROLOGIC ABO: CPT

## 2019-10-18 PROCEDURE — 74011250636 HC RX REV CODE- 250/636: Performed by: OBSTETRICS & GYNECOLOGY

## 2019-10-18 RX ORDER — SODIUM CHLORIDE 9 MG/ML
10 INJECTION INTRAMUSCULAR; INTRAVENOUS; SUBCUTANEOUS AS NEEDED
Status: DISCONTINUED | OUTPATIENT
Start: 2019-10-18 | End: 2019-10-19 | Stop reason: HOSPADM

## 2019-10-18 RX ORDER — SODIUM CHLORIDE 0.9 % (FLUSH) 0.9 %
5-10 SYRINGE (ML) INJECTION AS NEEDED
Status: DISCONTINUED | OUTPATIENT
Start: 2019-10-18 | End: 2019-10-19 | Stop reason: HOSPADM

## 2019-10-18 RX ORDER — HEPARIN 100 UNIT/ML
500 SYRINGE INTRAVENOUS AS NEEDED
Status: DISCONTINUED | OUTPATIENT
Start: 2019-10-18 | End: 2019-10-19 | Stop reason: HOSPADM

## 2019-10-18 RX ADMIN — Medication 10 ML: at 16:23

## 2019-10-18 RX ADMIN — SODIUM CHLORIDE 10 ML: 9 INJECTION INTRAMUSCULAR; INTRAVENOUS; SUBCUTANEOUS at 16:24

## 2019-10-18 RX ADMIN — Medication 500 UNITS: at 16:24

## 2019-10-18 NOTE — PROGRESS NOTES
[facility] Short Visit Progress Note    1400 Pt admit to [facility] for Labs/Port Flush ambulatory in stable condition. Assessment completed. No new concerns voiced. Patient Vitals for the past 12 hrs:   Temp Pulse Resp BP   10/18/19 1406 98.3 °F (36.8 °C) 88 18 109/63       Port accessed per protocol with positive blood return. Lab drawn, flushed, heparinized and de-accessed per protocol. 1415 Pt tolerated treatment well. D/c home ambulatory in no distress.  Pt aware of next appointment scheduled for   Future Appointments   Date Time Provider Gabriel Phillip   10/21/2019  9:00 AM [facility]   10/31/2019  4:00 PM [facility]   11/6/2019 10:00 AM Jeni Encarnacion MD [address] [place] [address]   11/27/2019  4:00 PM [address] [place] [address] [facility]   12/4/2019 10:15 AM Jeni Encarnacion MD [address] [place] [address]   12/26/2019  4:00 PM [address] [place] [address] [facility]UR[facility]   12/31/2019 10:15 AM Jeni Encarnacion MD [address] [place] [address]

## 2019-10-21 ENCOUNTER — HOSPITAL ENCOUNTER (OUTPATIENT)
Dept: INFUSION THERAPY | Age: 59
Discharge: HOME | End: 2019-10-21
Payer: MEDICARE

## 2019-10-21 VITALS
SYSTOLIC BLOOD PRESSURE: 124 MMHG | DIASTOLIC BLOOD PRESSURE: 75 MMHG | HEART RATE: 68 BPM | TEMPERATURE: 97.4 F | RESPIRATION RATE: 18 BRPM

## 2019-10-21 PROCEDURE — 36430 TRANSFUSION BLD/BLD COMPNT: CPT

## 2019-10-21 PROCEDURE — 74011250636 HC RX REV CODE- 250/636: Performed by: OBSTETRICS & GYNECOLOGY

## 2019-10-21 PROCEDURE — P9016 RBC LEUKOCYTES REDUCED: HCPCS

## 2019-10-21 PROCEDURE — 77030013169 SET IV BLD ICUM -A

## 2019-10-21 PROCEDURE — 77030012965 HC NDL HUBR BBMI -A

## 2019-10-21 RX ORDER — HEPARIN 100 UNIT/ML
500 SYRINGE INTRAVENOUS AS NEEDED
Status: CANCELLED | OUTPATIENT
Start: 2019-10-31

## 2019-10-21 RX ORDER — TELMISARTAN 20 MG/1
TABLET ORAL
Qty: 90 TAB | Refills: 3 | Status: ON HOLD | OUTPATIENT
Start: 2019-10-21 | End: 2021-01-01

## 2019-10-21 RX ORDER — SODIUM CHLORIDE 0.9 % (FLUSH) 0.9 %
5-10 SYRINGE (ML) INJECTION AS NEEDED
Status: CANCELLED | OUTPATIENT
Start: 2019-10-31

## 2019-10-21 RX ORDER — SODIUM CHLORIDE 9 MG/ML
10 INJECTION INTRAMUSCULAR; INTRAVENOUS; SUBCUTANEOUS AS NEEDED
Status: CANCELLED | OUTPATIENT
Start: 2019-10-31

## 2019-10-21 RX ORDER — SODIUM CHLORIDE 9 MG/ML
250 INJECTION, SOLUTION INTRAVENOUS AS NEEDED
Status: DISCONTINUED | OUTPATIENT
Start: 2019-10-21 | End: 2019-10-22 | Stop reason: HOSPADM

## 2019-10-21 RX ADMIN — SODIUM CHLORIDE 250 ML: 900 INJECTION, SOLUTION INTRAVENOUS at 08:30

## 2019-10-22 LAB
ABO + RH BLD: NORMAL
BLD PROD TYP BPU: NORMAL
BLOOD GROUP ANTIBODIES SERPL: NORMAL
BPU ID: NORMAL
CROSSMATCH RESULT,%XM: NORMAL
SPECIMEN EXP DATE BLD: NORMAL
STATUS OF UNIT,%ST: NORMAL
UNIT DIVISION, %UDIV: 0

## 2019-10-29 ENCOUNTER — TELEPHONE (OUTPATIENT)
Dept: ENDOCRINOLOGY | Age: 59
End: 2019-10-29

## 2019-10-29 NOTE — TELEPHONE ENCOUNTER
10/29/2019  11:41 AM      Ms. Tamez called stating that she missed placed her sliding scale for her insulin can it be sent to her through [employer], also stated that her BS are running between .           Thanks

## 2019-10-31 ENCOUNTER — HOSPITAL ENCOUNTER (OUTPATIENT)
Dept: INFUSION THERAPY | Age: 59
Discharge: HOME | End: 2019-10-31
Payer: MEDICARE

## 2019-10-31 VITALS
HEART RATE: 78 BPM | DIASTOLIC BLOOD PRESSURE: 87 MMHG | SYSTOLIC BLOOD PRESSURE: 135 MMHG | RESPIRATION RATE: 18 BRPM | TEMPERATURE: 98.2 F

## 2019-10-31 DIAGNOSIS — C56.9 MALIGNANT NEOPLASM OF OVARY, UNSPECIFIED LATERALITY (HCC): Primary | ICD-10-CM

## 2019-10-31 LAB
ALBUMIN SERPL-MCNC: 3.5 G/DL (ref 3.5–5)
ALBUMIN/GLOB SERPL: 0.7 {RATIO} (ref 1.1–2.2)
ALP SERPL-CCNC: 83 U/L (ref 45–117)
ALT SERPL-CCNC: 23 U/L (ref 12–78)
ANION GAP SERPL CALC-SCNC: 6 MMOL/L (ref 5–15)
AST SERPL-CCNC: 32 U/L (ref 15–37)
BASOPHILS # BLD: 0.1 K/UL (ref 0–0.1)
BASOPHILS NFR BLD: 1 % (ref 0–1)
BILIRUB SERPL-MCNC: 0.3 MG/DL (ref 0.2–1)
BUN SERPL-MCNC: 29 MG/DL (ref 6–20)
BUN/CREAT SERPL: 21 (ref 12–20)
CALCIUM SERPL-MCNC: 9.1 MG/DL (ref 8.5–10.1)
CHLORIDE SERPL-SCNC: 111 MMOL/L (ref 97–108)
CO2 SERPL-SCNC: 24 MMOL/L (ref 21–32)
CREAT SERPL-MCNC: 1.39 MG/DL (ref 0.55–1.02)
DIFFERENTIAL METHOD BLD: ABNORMAL
EOSINOPHIL # BLD: 0.3 K/UL (ref 0–0.4)
EOSINOPHIL NFR BLD: 5 % (ref 0–7)
ERYTHROCYTE [DISTWIDTH] IN BLOOD BY AUTOMATED COUNT: 13.4 % (ref 11.5–14.5)
GLOBULIN SER CALC-MCNC: 4.9 G/DL (ref 2–4)
GLUCOSE SERPL-MCNC: 92 MG/DL (ref 65–100)
HCT VFR BLD AUTO: 28.2 % (ref 35–47)
HGB BLD-MCNC: 8.3 G/DL (ref 11.5–16)
IMM GRANULOCYTES # BLD AUTO: 0 K/UL (ref 0–0.04)
IMM GRANULOCYTES NFR BLD AUTO: 0 % (ref 0–0.5)
LYMPHOCYTES # BLD: 1.3 K/UL (ref 0.8–3.5)
LYMPHOCYTES NFR BLD: 21 % (ref 12–49)
MCH RBC QN AUTO: 29.3 PG (ref 26–34)
MCHC RBC AUTO-ENTMCNC: 29.4 G/DL (ref 30–36.5)
MCV RBC AUTO: 99.6 FL (ref 80–99)
MONOCYTES # BLD: 0.7 K/UL (ref 0–1)
MONOCYTES NFR BLD: 11 % (ref 5–13)
NEUTS SEG # BLD: 3.7 K/UL (ref 1.8–8)
NEUTS SEG NFR BLD: 62 % (ref 32–75)
NRBC # BLD: 0 K/UL (ref 0–0.01)
NRBC BLD-RTO: 0 PER 100 WBC
PLATELET # BLD AUTO: 157 K/UL (ref 150–400)
PMV BLD AUTO: 10 FL (ref 8.9–12.9)
POTASSIUM SERPL-SCNC: 4.9 MMOL/L (ref 3.5–5.1)
PROT SERPL-MCNC: 8.4 G/DL (ref 6.4–8.2)
RBC # BLD AUTO: 2.83 M/UL (ref 3.8–5.2)
SODIUM SERPL-SCNC: 141 MMOL/L (ref 136–145)
WBC # BLD AUTO: 6 K/UL (ref 3.6–11)

## 2019-10-31 PROCEDURE — 74011250636 HC RX REV CODE- 250/636: Performed by: PHYSICIAN ASSISTANT

## 2019-10-31 PROCEDURE — 80053 COMPREHEN METABOLIC PANEL: CPT

## 2019-10-31 PROCEDURE — 77030012965 HC NDL HUBR BBMI -A

## 2019-10-31 PROCEDURE — 36591 DRAW BLOOD OFF VENOUS DEVICE: CPT

## 2019-10-31 PROCEDURE — 36415 COLL VENOUS BLD VENIPUNCTURE: CPT

## 2019-10-31 PROCEDURE — 85025 COMPLETE CBC W/AUTO DIFF WBC: CPT

## 2019-10-31 RX ORDER — SODIUM CHLORIDE 9 MG/ML
10 INJECTION INTRAMUSCULAR; INTRAVENOUS; SUBCUTANEOUS AS NEEDED
Status: DISCONTINUED | OUTPATIENT
Start: 2019-10-31 | End: 2019-11-01 | Stop reason: HOSPADM

## 2019-10-31 RX ORDER — SODIUM CHLORIDE 0.9 % (FLUSH) 0.9 %
5-10 SYRINGE (ML) INJECTION AS NEEDED
Status: DISCONTINUED | OUTPATIENT
Start: 2019-10-31 | End: 2019-11-01 | Stop reason: HOSPADM

## 2019-10-31 RX ORDER — HEPARIN 100 UNIT/ML
500 SYRINGE INTRAVENOUS AS NEEDED
Status: DISCONTINUED | OUTPATIENT
Start: 2019-10-31 | End: 2019-11-01 | Stop reason: HOSPADM

## 2019-10-31 RX ADMIN — Medication 500 UNITS: at 16:19

## 2019-10-31 RX ADMIN — SODIUM CHLORIDE 10 ML: 9 INJECTION INTRAMUSCULAR; INTRAVENOUS; SUBCUTANEOUS at 16:15

## 2019-10-31 RX ADMIN — Medication 10 ML: at 16:19

## 2019-10-31 NOTE — PROGRESS NOTES
[facility] Short Note                       Date: 2019    Name: Julio Paz    MRN: [id number]         : 1960      1610 Pt admit to [facility] for port flush/labs. Pt ambulatory in stable condition. Assessment completed. No new concerns voiced. Please review pending lab results in [address] [place] [address]. Ms. Cristiano Dawn vitals were reviewed prior to and after treatment. Patient Vitals for the past 12 hrs:   Temp Pulse Resp BP   10/31/19 1612 98.2 °F (36.8 °C) 78 18 135/87     Medications given:   Medications Administered     heparin (porcine) pf 500 Units     Admin Date  10/31/2019 Action  Given Dose  500 Units Route  IntraVENous Administered By  Pierce Andrews RN          sodium chloride (NS) flush 5-10 mL     Admin Date  10/31/2019 Action  Given Dose  10 mL Route  IntraVENous Administered By  Pierce Andrews RN          sodium chloride 0.9% injection 10 mL     Admin Date  10/31/2019 Action  Given Dose  10 mL Route  IntraVENous Administered By  Pierce Andrews RN              Port accessed with positive blood return. Labs drawn. Port flushed, heparinized and de-accessed per protocol. Ms. Mel La was discharged from [place] [address] in stable condition at 1625.      Future Appointments   Date Time Provider Gabriel Phillip   2019 10:00 AM Rajesh Jaimes MD [address]   2019  4:00 PM [facility] INFUSION NURSE 6 [facility]   2019 10:15 AM Rajesh Jaimes MD [facility]   2019  4:00 PM KAYLENE INFUSION NURSE 6 [facility]   2019 10:15 AM Rajesh Jaimes MD [address] RN  2019  4:29 PM

## 2019-11-04 DIAGNOSIS — J06.9 UPPER RESPIRATORY TRACT INFECTION, UNSPECIFIED TYPE: ICD-10-CM

## 2019-11-04 RX ORDER — FLUTICASONE PROPIONATE 50 MCG
SPRAY, SUSPENSION (ML) NASAL
Qty: 1 BOTTLE | Refills: 2 | Status: SHIPPED | OUTPATIENT
Start: 2019-11-04 | End: 2020-01-29

## 2019-11-06 ENCOUNTER — OFFICE VISIT (OUTPATIENT)
Dept: GYNECOLOGY | Age: 59
End: 2019-11-06

## 2019-11-06 VITALS
WEIGHT: 280.8 LBS | HEART RATE: 85 BPM | DIASTOLIC BLOOD PRESSURE: 63 MMHG | SYSTOLIC BLOOD PRESSURE: 109 MMHG | HEIGHT: 69 IN | BODY MASS INDEX: 41.59 KG/M2

## 2019-11-06 DIAGNOSIS — C80.0 CARCINOMATOSIS (HCC): ICD-10-CM

## 2019-11-06 DIAGNOSIS — E11.21 TYPE 2 DIABETES MELLITUS WITH NEPHROPATHY (HCC): ICD-10-CM

## 2019-11-06 DIAGNOSIS — T45.1X5A ANEMIA ASSOCIATED WITH CHEMOTHERAPY: ICD-10-CM

## 2019-11-06 DIAGNOSIS — D64.81 ANEMIA ASSOCIATED WITH CHEMOTHERAPY: ICD-10-CM

## 2019-11-06 DIAGNOSIS — Z96.0 S/P URETERAL STENT PLACEMENT: ICD-10-CM

## 2019-11-06 DIAGNOSIS — Z79.899 ON ANTINEOPLASTIC CHEMOTHERAPY: ICD-10-CM

## 2019-11-06 DIAGNOSIS — E66.01 MORBID OBESITY (HCC): ICD-10-CM

## 2019-11-06 DIAGNOSIS — Z79.4 CONTROLLED TYPE 2 DIABETES MELLITUS WITHOUT COMPLICATION, WITH LONG-TERM CURRENT USE OF INSULIN (HCC): ICD-10-CM

## 2019-11-06 DIAGNOSIS — Z95.828 PORT-A-CATH IN PLACE: ICD-10-CM

## 2019-11-06 DIAGNOSIS — E11.9 CONTROLLED TYPE 2 DIABETES MELLITUS WITHOUT COMPLICATION, WITH LONG-TERM CURRENT USE OF INSULIN (HCC): ICD-10-CM

## 2019-11-06 DIAGNOSIS — C56.9 MALIGNANT NEOPLASM OF OVARY, UNSPECIFIED LATERALITY (HCC): Primary | ICD-10-CM

## 2019-11-06 DIAGNOSIS — R59.0 LYMPHADENOPATHY, MEDIASTINAL: ICD-10-CM

## 2019-11-06 DIAGNOSIS — R22.2 CHEST WALL MASS: ICD-10-CM

## 2019-11-06 RX ORDER — ROSUVASTATIN CALCIUM 10 MG/1
TABLET, COATED ORAL
Refills: 0 | Status: ON HOLD | COMMUNITY
Start: 2019-10-24 | End: 2021-01-01

## 2019-11-06 RX ORDER — DEXTROAMPHETAMINE SACCHARATE, AMPHETAMINE ASPARTATE, DEXTROAMPHETAMINE SULFATE AND AMPHETAMINE SULFATE 5; 5; 5; 5 MG/1; MG/1; MG/1; MG/1
TABLET ORAL
Refills: 0 | Status: ON HOLD | COMMUNITY
Start: 2019-10-28 | End: 2021-01-01

## 2019-11-06 NOTE — PROGRESS NOTES
[facility] [address], [address] [phone]  F [phone]    Office Visit    Patient ID:  Name: Stefan Alfred  MRN: [id number]  : 1960/59 y.o. Visit date: 2019    LEONID Chatterjee is a 63 y.o.  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 The patient's previous treatment procedures include primary Taxol/carbo with retreatment in  and Doxil/Avastin, topotecan, gemzar and now single agent weekly Taxol initiated 18 following progression with chest wall involvement. In 2018, patient did not follow up and self-discontinued her treatment regimen. She presented to the ER on 18 with SOB. Negative workup for PE or MI. Admitted to Hospitalist service from -18. Managed for acute asthma exacerbation, acute FELICIA, and hyperkalemia. The patient was initiated on Nirmala Benitez on 19. Underwent repeat CT C/A/P on 19 for purposes of following response to treatment. Patient was tolerating Nirmala Benitez well for the first month, but then had a rise in her creatinine. However, she was also found to have hydronephrosis. Underwent right ureteral stent placement on 3/18/19. Restarted Zejula afterwards, although with continued rise in creatinine to >2. Held Nirmala Benitez again and restarted Zejula at 100mg/daily on 19. Presents today for follow-up and consideration of continuation of Nirmala Benitez. Completed 2700 cGy of radiation to her chest wall mass on 19. Tolerating treatment well. Held for one week in 10/2019 for blood transfusion secondary to anemia. Normal iron and B12 labs. Her Ca-125 has bounced around, but has been steadily declining recently. The pain at her chest wall mass is much improved. Otherwise the patient is really without complaints. Denies change in appetite or bowel habits.  Denies vaginal bleeding, hematuria, hematochezia, constipation, diarrhea, nausea, vomiting, CP, SOB, fevers, or chills. Just returned from her vacation in [facility] yesterday. CT C/A/P on 10/2/19 with improvement in her disease. Currently being treated for recurrent UTI. Had ureteral stent exchange on 10/31/19.       OncTx History:  9/21/12: MARAH/BSO/Cytoreductive surgery on 9/21/12 noted carcinomatosis/omental caking. 10/2012-2/2013: 6 cycles Carbo/Taxol  8/2014-11/2014: 6 Cycles carbo/Taxol  3/2015-5/13/15: 3 cycles Avastin/Doxil until progression  7/2015-8/2015 Weekly Topotecan   12/2015-3/2016: Weekly Topotecan  2/2017-9/2017:  Gemzar D1/D8 Q28 days for 6 cycles  4/2018: CT core needle bx chest wall mass: Metastatic high-grade serous carcinoma (see comment)   General Categorization   Positive for malignancy. 5/8/2018-Present: Taxol  O5,6,44; S/P  Cycle #4  8/14/2018. Patient discontinued treatment secondary to side effects and fatigue. 1/23/2019 - Initiated Slime Jeffry. Held do to rising creatinine. However, she was also found to have hydronephrosis. Underwent right ureteral stent placement on 3/18/19. Imaging Review:   CT C/A/P 10/2/19:   FINDINGS:     THYROID: Visualized gland is unremarkable. MEDIASTINUM: Upper left paratracheal node measures 2.2 x 3.4 cm (2, 8),  previously 2.3 x 3.5 cm. Superior prevascular node measures 2.9 x 3.0 cm,  previously 3.5 x 3.8 cm. Poorly delineated subcarinal node measures about 3.4 x  4.8 cm, previously 3.6 x 4.8 cm. Precardiac nodes measure 1.9 x 2.5 cm and 1.7 x  2.4 cm (2, 39), previously 2.1 x 2.7 cm and 2.0 x 2.0 cm. SWATI: No mass or lymphadenopathy. THORACIC AORTA: No dissection or aneurysm. MAIN PULMONARY ARTERY: Normal in caliber. TRACHEA/BRONCHI: Patent. ESOPHAGUS: No wall thickening or dilatation. HEART: Normal in size. PLEURA: No effusion or pneumothorax. LUNGS: No nodule, mass, or airspace disease.   LIVER: Multiple hypodense lesions suspicious for metastases, measuring up to 2.5  x 4.0 cm in segment 2 (2, 43) series, previously 2.8 x 3.7 cm, 3.2 x 4.6 cm in  segment 3 (2, 55), previously 3.1 x 4.5 cm, and partially exophytic from the  posterior inferior right lobe segment 6 measuring 2.5 x 3.5 cm, previously 2.8 x  3.4 cm. GALLBLADDER: Unremarkable. SPLEEN: Central 1.9 x 3.1 cm hypodensity (2, 47), previously 1.9 x 2.3 cm. Otherwise unremarkable. PANCREAS: Inseparable from bulky central abdominal adenopathy with no identified  ductal dilation. ADRENALS: Unremarkable. KIDNEYS: Right hydronephrosis has improved status post right nephroureteral  stent placement. Kidneys otherwise appear unremarkable. STOMACH: Unremarkable. SMALL BOWEL: No dilatation or wall thickening. COLON: No dilation or wall thickening. APPENDIX: Unremarkable. PERITONEUM: Extensive peritoneal implants with representative largest lesions  measuring 4.4 x 6.1 cm and the left adnexal region (2, 98), previously 4.3 x 5.7  cm, 3.8 x 5.9 cm in the anterior midline (2, 81), previously 3.4 x 5.4 cm, and  2.8 x 4.7 cm in the right abdomen (2, 78), previously 3.2 x 4.7 cm. RETROPERITONEUM: Extensive rocio hepatis, retroperitoneal, and pelvic sidewall  adenopathy. Conglomerate nkechi mass in the rocio hepatis region measures 5.7 x  11.1 cm (2, 58) is, previously 6.3 x 12.2 cm. Aortocaval node measures 3.7 x 5.3  cm (2, 77), previously 4.5 x 5.4 cm. Right pelvic sidewall node measures 2.9 x  4.2 cm (2, 103), previously 3.4 x 4.2 cm. Left external iliac node measures 3.0  x 4.8 cm (2, 101), previously 3.3 x 4.8 cm. REPRODUCTIVE ORGANS: Uterus and ovaries are surgically absent. URINARY BLADDER: No mass or calculus. Distal end of right nephroureteral stent  positioned within bladder lumen. BONES: Degenerative spine change. No acute fracture or aggressive lesion. ADDITIONAL COMMENTS: Mass centered at the right sternocostal junction measures  5.3 x 5.4 cm (2, 21), previously 4.8 x 5.7 cm.  Post surgical changes in the  anterior abdominal wall. Left Port-A-Cath in place. Bilateral inguinal  adenopathy measuring 2.1 x 3.2 cm on the right (2, 115), previously 2.3 x 3.7 cm  and 2.7 x 2.8 cm on the left (2, 108), previously 2.9 x 3.1 cm. .  IMPRESSION  IMPRESSION: Overall response to disease with either decrease in size or no  significant change in extensive nkechi metastases, hepatic metastases, right  sternal metastasis, and peritoneal carcinomatosis as above. No new metastasis or  progressive metastasis identified. Status post right nephroureteral stent with  interval resolution of right hydronephrosis. CT C/A/P 2/5/19:  FINDINGS:   THYROID: No nodule. MEDIASTINUM: Mediastinal lymphadenopathy is unchanged. A reference superior  mediastinal lymph node is stable measuring 3.3 cm. SWATI: No mass or lymphadenopathy. THORACIC AORTA: No dissection or aneurysm. MAIN PULMONARY ARTERY: Normal in caliber. TRACHEA/BRONCHI: Patent. ESOPHAGUS: No wall thickening or dilatation. HEART: Normal in size. PLEURA: No effusion or pneumothorax. LUNGS: No nodule, mass, or airspace disease. LIVER: Evaluation of the liver is limited by lack of intravenous contrast. There  is a vague 4.4 cm hypodense lesion in the left hepatic lobe previously measuring  3.0 cm. Lesion in the right hepatic lobe now measures 3.3 cm, previously  measuring 2.7 cm. GALLBLADDER: Unremarkable. SPLEEN: Central soft tissue abnormality has increased in size, now measuring 4.9  cm, previously measuring 3.1 cm. PANCREAS: Not well evaluated given the massive lymphadenopathy and lack of  intravenous contrast.  ADRENALS: Unremarkable. KIDNEYS: There is no moderate right hydronephrosis. STOMACH: Unremarkable. SMALL BOWEL: No dilatation or wall thickening. COLON: No dilatation or wall thickening. APPENDIX: Not visualized. PERITONEUM: Bulky gastrohepatic, periceliac, portacaval, and mesenteric  lymphadenopathy has increased. There is secondary invasion of the liver.  Maximum  dimension in the upper abdomen is currently 10.4 cm, previously 9.3 cm. Elemental soft tissue nodules have increased in size, with a reference 7.0 cm  nodule in the left abdomen previously measuring 5.5 cm. Soft tissue nodule in  the deep pelvis between the bladder and colon has increased in size as well. RETROPERITONEUM: No lymphadenopathy or aortic aneurysm. REPRODUCTIVE ORGANS: The uterus and ovaries are surgically absent. URINARY BLADDER: No mass or calculus. BONES: Soft tissue mass involving the right sternum has not changed. Degenerative changes are seen in the thoracic and lumbar spine. ADDITIONAL COMMENTS: N/A  IMPRESSION  IMPRESSION:  Stable mediastinal lymphadenopathy. Progressive abdominal lymphadenopathy as  described above. Liver lesions have increased in size compared to the prior  exam. Stable right sternal mass. Moderate right hydroureteronephrosis is now  noted which appears to be related to the lymphadenopathy. ROS  Constitutional: no weight loss, fever, night sweats  Respiratory: no cough, shortness of breath, or wheezing  Cardiovascular: no chest pain or dyspnea on exertion. Reports chest wall mass smaller, no associated pain. Heme: No abnormal bleeding  Gastrointestinal: no abdominal pain, change in bowel habits, or black or bloody stools  Genito-Urinary: no dysuria, trouble voiding, or hematuria  Musculoskeletal: + swelling in ankle - bilateral, mostly only at end of day  Neurological: mild neuropathy  Derm: pigmentation/induration medial left leg. Prior injury from fall. Psych: positive for depression - controlled        PMH:  Past Medical History:   Diagnosis Date    Adverse effect of anesthesia     sleep apnea cpap machine useage     Anemia     Arthritis     DDD low back and knees    Asthma     uses albuterol prn only; none x 6 mos.   Never hospitalized    BRCA negative 1/2013    Calculus of kidney     Chronic kidney disease     kidney stones    Chronic pain     knee pain bilat    CINV (chemotherapy-induced nausea and vomiting) 2014    Decreased hearing, right     PATIENT STATES THIS IS DUE TO CHEMOTHERAPY    Diabetes ([facility] [address] Age 46    IDDM    Environmental allergies     Fibromyalgia     GERD (gastroesophageal reflux disease)     controlled with med    Hx of pulmonary embolus during pregnancy 2015    Hydronephrosis due to obstruction of ureter 3/18/2019    Hypertension     Ill-defined condition     Sepsis  -  SOURCE PORT    Ill-defined condition 2016    chemotherapy     Mass of chest wall 2018    Morbid obesity (HCC)     Murmur, heart     Other and unspecified hyperlipidemia     Ovarian cancer ([facility] [address] 2012, 2014    serous, high grade, stage IIIc. s/p chemotx (has port)    Psychiatric disorder     Depression/Anxiety    Rectal bleeding     Thromboembolus ([address]     POST PARTUM; resolved- PELVIS    Thyroid disease     HYPOTHYROID    Unspecified sleep apnea     CPAP, Dr. Mitchell Barrett     PSH:  Past Surgical History:   Procedure Laterality Date    BREAST SURGERY PROCEDURE UNLISTED      Lymph node in left breast 2017   [place]     X2    HX HYSTERECTOMY  2012    ROULA/BSO, tumor debulking, omentectomy for ovarian cancer    HX ORTHOPAEDIC Right     ankle-FX, R    HX ORTHOPAEDIC Right     FX R ARM    HX UROLOGICAL Right     STENT FOR KIDNEY STONES    HX VASCULAR ACCESS  2015    right chest- port placed    HX VASCULAR ACCESS Left 2017    TETE CATH     SOC:  Social History     Socioeconomic History    Marital status:      Spouse name: Not on file    Number of children: Not on file    Years of education: Not on file    Highest education level: Not on file   Occupational History    Not on file   Social Needs    Financial resource strain: Not on file    Food insecurity:     Worry: Not on file     Inability: Not on file    Transportation needs:     Medical: Not on file     Non-medical: Not on file   Tobacco Use  Smoking status: Never Smoker    Smokeless tobacco: Never Used   Substance and Sexual Activity    Alcohol use: Yes     Comment: 1 drink per month    Drug use: No    Sexual activity: Yes   Lifestyle    Physical activity:     Days per week: Not on file     Minutes per session: Not on file    Stress: Not on file   Relationships    Social connections:     Talks on phone: Not on file     Gets together: Not on file     Attends [employer] service: Not on file     Active member of club or organization: Not on file     Attends meetings of clubs or organizations: Not on file     Relationship status: Not on file    Intimate partner violence:     Fear of current or ex partner: Not on file     Emotionally abused: Not on file     Physically abused: Not on file     Forced sexual activity: Not on file   Other Topics Concern    Not on file   Social History Narrative    Lives in [facility] alone.  passed away in 5/16 of a heart attack. Has 2 sons. Disability. 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 Enjoys swimming and going to the gym.      Family History  Family History   Problem Relation Age of Onset    Hypertension Mother     Arthritis-osteo Mother     Hypertension Father     Arthritis-osteo Father     Cancer Father         PROSTATE    COPD Father     Hypertension Brother     Elevated Lipids Brother     Arthritis-osteo Brother     Hypertension Brother     Elevated Lipids Brother     Obesity Brother     Cancer Brother         PROSTATE    Anesth Problems Son         DELAYED AWAKENING    Diabetes Maternal Grandmother     Anesth Problems Paternal Grandmother         PATIENT [address], [address] STOPPED DURING SURGERY     Medications: (reviewed)  Current Outpatient Medications on File Prior to Visit   Medication Sig Dispense Refill    dextroamphetamine-amphetamine (ADDERALL) 20 mg tablet TAKE 1 TABLET BY MOUTH EVERY DAY  0    rosuvastatin (CRESTOR) 10 mg tablet TAKE 1 TABLET BY MOUTH EVERY DAY  0    fluticasone propionate (FLONASE) 50 mcg/actuation nasal spray 2 SPRAYS IN BOTH NOSTRILS DAILY 1 Bottle 2    telmisartan (MICARDIS) 20 mg tablet TAKE 1 TABLET BY MOUTH EVERY DAY 90 Tab 3    atorvastatin (LIPITOR) 20 mg tablet       BD ULTRA-FINE SHORT PEN NEEDLE 31 gauge x 5/16\" ndle USE TO INJECT INSULIN 5 TIMES DAILY 200 Pen Needle 11    benzonatate (TESSALON) 200 mg capsule Take 1 Cap by mouth three (3) times daily as needed for Cough. 30 Cap 0    insulin aspart U-100 (NOVOLOG FLEXPEN U-100 INSULIN) 100 unit/mL (3 mL) inpn INJECT 20 UNITS BEFORE EACH MEAL + 4 UNITS FOR EVERY 50 MG/DL ABOVE 150 MG/DL.  UNITS PER DAY 90 Adjustable Dose Pre-filled Pen Syringe 3    LEVEMIR FLEXTOUCH U-100 INSULN 100 unit/mL (3 mL) inpn INJECT 30 UNITS IN AM AND 50 UNITS AT NIGHT. INCREASE AS DIRECTED TO  UNITS/DAY. 90 Adjustable Dose Pre-filled Pen Syringe 3    VICTOZA 3-ERNESTO 0.6 mg/0.1 mL (18 mg/3 mL) pnij INJECT 1.8 MG BY SUBCUTANEOUS ROUTE DAILY (WITH LUNCH). 27 Adjustable Dose Pre-filled Pen Syringe 3    DEXILANT 60 mg CpDB capsule (delayed release) TAKE 1 CAPSULE BY MOUTH EVERY DAY 90 Cap 3    omega-3 acid ethyl esters (LOVAZA) 1 gram capsule TAKE 1 CAPSULE BY MOUTH TWICE A  Cap 2    LINZESS 145 mcg cap capsule TAKE 1 CAPSULE BY MOUTH EVERY DAYY 30 Cap 0    ondansetron (ZOFRAN ODT) 4 mg disintegrating tablet Take 1 Tab by mouth every eight (8) hours as needed for Nausea. Indications: Nausea and Vomiting caused by Cancer Drugs 30 Tab 2    levothyroxine (SYNTHROID) 150 mcg tablet TAKE 1 TABLET BY MOUTH EVERY DAY BEFORE BREAKFAST 90 Tab 0    EPINEPHrine (EPIPEN) 0.3 mg/0.3 mL injection INJECT 0.3 ML BY INTRAMUSCULAR ROUTE ONCE AS NEEDED FOR UP TO 1 DOSE.  1    ZEJULA 100 mg cap Take 100 mg by mouth two (2) times a day.       fluticasone propionate (FLONASE) 50 mcg/actuation nasal spray       amoxicillin (AMOXIL) 500 mg capsule Prior to dental procedures      oxyCODONE IR (ROXICODONE) 5 mg immediate release tablet Take 1 Tab by mouth every four (4) hours as needed for Pain. Max Daily Amount: 30 mg. 45 Tab 0    glucose blood VI test strips (TRUE METRIX GLUCOSE TEST STRIP) strip by Does Not Apply route See Admin Instructions. 30 Strip 5    lancets misc by Does Not Apply route daily. True Metrix lancets- test blood sugar once per day 30 Each 5    Blood-Glucose Meter monitoring kit Check up to tid, as directed 1 Kit 0    azelastine (ASTELIN) 137 mcg (0.1 %) nasal spray 1 [employer] by Both Nostrils route daily as needed. Use in each nostril as directed       insulin detemir U-100 (LEVEMIR) 100 unit/mL injection 20 Units by SubCUTAneous route daily (with lunch).  montelukast (SINGULAIR) 10 mg tablet Take 10 mg by mouth nightly.  ascorbic acid, vitamin C, (VITAMIN C) 1,000 mg tablet Take 1,000 mg by mouth daily.  albuterol (PROVENTIL VENTOLIN) 2.5 mg /3 mL (0.083 %) nebulizer solution 2.5 mg by Nebulization route every four (4) hours as needed for Wheezing.  potassium chloride (KLOR-CON M10) 10 mEq tablet TAKE 1 TAB BY MOUTH TWO (2) DAYS A WEEK. WED AND FRI 90 Tab 1    Liraglutide (VICTOZA) 0.6 mg/0.1 mL (18 mg/3 mL) pnij 1.8 mg by SubCUTAneous route daily (with lunch). 3 Pen 3    SYMBICORT 160-4.5 mcg/actuation HFAA Take 2 Puffs by inhalation two (2) times daily as needed. 2 Inhaler 3    clonazePAM (KLONOPIN) 0.5 mg tablet Take 0.5 mg by mouth nightly as needed.  sertraline (ZOLOFT) 50 mg tablet TAKE 1 TABLET BY MOUTH as needed  1    cholecalciferol, VITAMIN D3, (VITAMIN D3) 5,000 unit tab tablet Take 5,000 Units by mouth daily.  furosemide (LASIX) 40 mg tablet Take 40 mg by mouth every other day. 3     No current facility-administered medications on file prior to visit. Allergies: (reviewed)  Allergies   Allergen Reactions    Carboplatin Other (comments)     Patient developed shortness of breath, felt faint, coughing, skin flushed and \"itchy\". This occurred on the patients 8th treatment.  Iodinated Contrast Media Rash     13 hr pre-medication prior to IV contrast.   Patient has done well with 1 hr pre-medication of (Solu-Medrol) 40mg &  (Benadryl) 50mg.  Lipitor [Atorvastatin] Myalgia    Shellfish Containing Products Hives    Tape [Adhesive] Itching and Other (comments)     \"op site-- clear,thin tape\"--caused blister     Tomato Hives    Olmesartan Other (comments)    Losartan Other (comments)     headaches    Percocet [Oxycodone-Acetaminophen] Other (comments)     Fever; however, current home med       OBJECTIVE  Physical Exam  Visit Vitals  /63 (BP 1 Location: Left arm, BP Patient Position: Sitting)   Pulse 85   Ht 5' 9\" (1.753 m)   Wt 280 lb 12.8 oz (127.4 kg)   LMP 09/07/2011   BMI 41.47 kg/m²     Physical Exam:  General: Alert and oriented. No acute distress. Well-nourished  HEENT: No thyroid enlargment. Neck supple without restrictions. Sclera normal. Normal occular motion. Moist mucous membranes. Lymphatics: No evidence of axillary, cervical, or subclavicular adenopathy. Respiratory: clear to auscultation and percussion to the bases. No CVAT. Chest wall mass is still palpable along the superior sternum at the level of the superior aspect of the breasts, but improved. Normal overlying skin  Cardiovascular: regular rate and rhythm. No murmurs, rubs, or gallops. Gastrointestinal: soft, non-tender, non-distended, no masses or organomegaly. Well-healed incision. Musculoskeletal: normal gait. No joint tenderness, deformity or swelling. No muscular tenderness. Extremities: extremities normal, atraumatic, mild 1+ edema bilaterally. Pelvic: deferred today  Neuro: Grossly intact. Normal gait and movement. No acute deficit  Skin: No evidence of rashes or skin changes.      DATE REVIEW as available:  Lab Results   Component Value Date/Time    WBC 6.0 10/31/2019 04:26 PM    Hemoglobin (POC) 10.5 (L) 03/20/2017 12:15 PM    HGB 8.3 (L) 10/31/2019 04:26 PM    Hematocrit (POC) 31 (L) 03/20/2017 12:15 PM    HCT 28.2 (L) 10/31/2019 04:26 PM    PLATELET 459 56/04/0460 04:26 PM    MCV 99.6 (H) 10/31/2019 04:26 PM     Lab Results   Component Value Date/Time    ABS. NEUTROPHILS 3.7 10/31/2019 04:26 PM     Lab Results   Component Value Date/Time    Sodium 141 10/31/2019 04:26 PM    Potassium 4.9 10/31/2019 04:26 PM    Chloride 111 (H) 10/31/2019 04:26 PM    CO2 24 10/31/2019 04:26 PM    Anion gap 6 10/31/2019 04:26 PM    Glucose 92 10/31/2019 04:26 PM    BUN 29 (H) 10/31/2019 04:26 PM    Creatinine 1.39 (H) 10/31/2019 04:26 PM    BUN/Creatinine ratio 21 (H) 10/31/2019 04:26 PM    GFR est AA 47 (L) 10/31/2019 04:26 PM    GFR est non-AA 39 (L) 10/31/2019 04:26 PM    Calcium 9.1 10/31/2019 04:26 PM    Bilirubin, total 0.3 10/31/2019 04:26 PM    AST (SGOT) 32 10/31/2019 04:26 PM    Alk. phosphatase 83 10/31/2019 04:26 PM    Protein, total 8.4 (H) 10/31/2019 04:26 PM    Albumin 3.5 10/31/2019 04:26 PM    Globulin 4.9 (H) 10/31/2019 04:26 PM    A-G Ratio 0.7 (L) 10/31/2019 04:26 PM    ALT (SGPT) 23 10/31/2019 04:26 PM       ECHO 7/17/18  Left ventricle: Systolic function was normal. Ejection fraction was estimated in the range of 55 % to 60 %. There were no regional wall motion  abnormalities. Wall thickness was mildly increased. Left atrium: The atrium was mildly dilated. Mitral valve: There was mild regurgitation. Aortic valve: Leaflets exhibited sclerosis without stenosis. Not well visualized. IMPRESSION AND PLAN:  Ms. Keya Barkley is a 63 y.o. female with recurrent, metastatic ovarian cancer. Heavily pre-treated. Lost to follow-up since 9/2018, at which time patient self-discontinued weekly paclitaxel. Initiated Dotty Grebe on 1/23/19. Held after 1 month secondary to rising creatinine. Found to have right hydronephrosis. Right ureteral stent placed 3/18/19 with normalization of creatinine. Completed 2700 cGy to the sternun 7/11/19.  Restarted Zejula at 100mg/daily on 4/23/19. ECOG 1    Problem List Items Addressed This Visit        Endocrine    Controlled type 2 diabetes mellitus without complication, with long-term current use of insulin (HCC)    Relevant Medications    rosuvastatin (CRESTOR) 10 mg tablet    Type 2 diabetes mellitus with nephropathy (HCC)    Relevant Medications    rosuvastatin (CRESTOR) 10 mg tablet       Immune    Lymphadenopathy, mediastinal       Reproductive    Ovarian cancer ([address] - Primary       Other    Carcinomatosis ([facility] [address]    Morbid obesity (HCC)    Relevant Medications    dextroamphetamine-amphetamine (ADDERALL) 20 mg tablet    Port-A-Cath in place    Chest wall mass    On antineoplastic chemotherapy    S/P ureteral stent placement    Anemia associated with chemotherapy          Reviewed patient's course to date. Tolerating Zejula 100mg/daily. Creatinine stable. Ca-125 trending down. CT C/A/P on 10/2/19 with improvement and stability of disease burden. Stable Hgb after transfusion on 10/21/19. Normal iron and B12 studies. Restarted Zejula last week. Again noted that while the patient is BRCA negative, there is some evidence that PARPi may provide some benefit to BRCA wild-type patients. Previously I discussed goals of care, including that any type of treatment at this time is palliative in nature. I have reviewed her chemotherapy history, which is extensive; although the patient has been somewhat non-compliant with treatments with missed treatments \"here and there\". Stressed importance of remaining on treatment. Previously discussed that palliative therapy at this time has a response rate at best of 5-10% regardless of the type of treatment. Counseled patient regarding code status and advance care directives, which the patient is going to consider. She reports she has a \"living will\", but I am unsure as to her wishes regarding code status. Will plan to continue this dialogue at future visits.      All questions and concerns were addressed with the patient and she is comfortable with the plan.     Rosemary Echavarria MD

## 2019-11-06 NOTE — PROGRESS NOTES
One month follow up with Teofilo Cruz, labs were drawn on 10/31/19, wants to know if she can have a flu vaccine    1. Have you been to the ER, urgent care clinic since your last visit? Hospitalized since your last visit?  no    2. Have you seen or consulted any other health care providers outside of the [address] [place] [address] since your last visit? Include any pap smears or colon screening.    no

## 2019-11-21 RX ORDER — SODIUM CHLORIDE 0.9 % (FLUSH) 0.9 %
5-10 SYRINGE (ML) INJECTION AS NEEDED
Status: CANCELLED | OUTPATIENT
Start: 2019-11-27

## 2019-11-21 RX ORDER — HEPARIN 100 UNIT/ML
500 SYRINGE INTRAVENOUS AS NEEDED
Status: CANCELLED | OUTPATIENT
Start: 2019-11-27

## 2019-11-21 RX ORDER — SODIUM CHLORIDE 9 MG/ML
10 INJECTION INTRAMUSCULAR; INTRAVENOUS; SUBCUTANEOUS AS NEEDED
Status: CANCELLED | OUTPATIENT
Start: 2019-11-27

## 2019-11-27 ENCOUNTER — HOSPITAL ENCOUNTER (OUTPATIENT)
Dept: INFUSION THERAPY | Age: 59
Discharge: HOME | End: 2019-11-27
Payer: MEDICARE

## 2019-11-27 VITALS
RESPIRATION RATE: 18 BRPM | HEART RATE: 82 BPM | SYSTOLIC BLOOD PRESSURE: 120 MMHG | DIASTOLIC BLOOD PRESSURE: 71 MMHG | TEMPERATURE: 97.8 F

## 2019-11-27 DIAGNOSIS — C56.9 MALIGNANT NEOPLASM OF OVARY, UNSPECIFIED LATERALITY (HCC): Primary | ICD-10-CM

## 2019-11-27 LAB
ALBUMIN SERPL-MCNC: 3.6 G/DL (ref 3.5–5)
ALBUMIN/GLOB SERPL: 0.7 {RATIO} (ref 1.1–2.2)
ALP SERPL-CCNC: 97 U/L (ref 45–117)
ALT SERPL-CCNC: 28 U/L (ref 12–78)
ANION GAP SERPL CALC-SCNC: 6 MMOL/L (ref 5–15)
AST SERPL-CCNC: 42 U/L (ref 15–37)
BASOPHILS # BLD: 0.1 K/UL (ref 0–0.1)
BASOPHILS NFR BLD: 1 % (ref 0–1)
BILIRUB SERPL-MCNC: 0.4 MG/DL (ref 0.2–1)
BUN SERPL-MCNC: 34 MG/DL (ref 6–20)
BUN/CREAT SERPL: 19 (ref 12–20)
CALCIUM SERPL-MCNC: 9.2 MG/DL (ref 8.5–10.1)
CANCER AG125 SERPL-ACNC: 191 U/ML (ref 1.5–35)
CHLORIDE SERPL-SCNC: 111 MMOL/L (ref 97–108)
CO2 SERPL-SCNC: 22 MMOL/L (ref 21–32)
CREAT SERPL-MCNC: 1.79 MG/DL (ref 0.55–1.02)
DIFFERENTIAL METHOD BLD: ABNORMAL
EOSINOPHIL # BLD: 0.7 K/UL (ref 0–0.4)
EOSINOPHIL NFR BLD: 10 % (ref 0–7)
ERYTHROCYTE [DISTWIDTH] IN BLOOD BY AUTOMATED COUNT: 14.1 % (ref 11.5–14.5)
GLOBULIN SER CALC-MCNC: 5.1 G/DL (ref 2–4)
GLUCOSE SERPL-MCNC: 123 MG/DL (ref 65–100)
HCT VFR BLD AUTO: 26.9 % (ref 35–47)
HGB BLD-MCNC: 8 G/DL (ref 11.5–16)
IMM GRANULOCYTES # BLD AUTO: 0 K/UL (ref 0–0.04)
IMM GRANULOCYTES NFR BLD AUTO: 0 % (ref 0–0.5)
LYMPHOCYTES # BLD: 1.3 K/UL (ref 0.8–3.5)
LYMPHOCYTES NFR BLD: 20 % (ref 12–49)
MCH RBC QN AUTO: 29.2 PG (ref 26–34)
MCHC RBC AUTO-ENTMCNC: 29.7 G/DL (ref 30–36.5)
MCV RBC AUTO: 98.2 FL (ref 80–99)
MONOCYTES # BLD: 0.6 K/UL (ref 0–1)
MONOCYTES NFR BLD: 10 % (ref 5–13)
NEUTS SEG # BLD: 3.8 K/UL (ref 1.8–8)
NEUTS SEG NFR BLD: 59 % (ref 32–75)
NRBC # BLD: 0 K/UL (ref 0–0.01)
NRBC BLD-RTO: 0 PER 100 WBC
PLATELET # BLD AUTO: 162 K/UL (ref 150–400)
PMV BLD AUTO: 9.9 FL (ref 8.9–12.9)
POTASSIUM SERPL-SCNC: 4.5 MMOL/L (ref 3.5–5.1)
PROT SERPL-MCNC: 8.7 G/DL (ref 6.4–8.2)
RBC # BLD AUTO: 2.74 M/UL (ref 3.8–5.2)
SODIUM SERPL-SCNC: 139 MMOL/L (ref 136–145)
WBC # BLD AUTO: 6.5 K/UL (ref 3.6–11)

## 2019-11-27 PROCEDURE — 36591 DRAW BLOOD OFF VENOUS DEVICE: CPT

## 2019-11-27 PROCEDURE — 74011250636 HC RX REV CODE- 250/636: Performed by: PHYSICIAN ASSISTANT

## 2019-11-27 PROCEDURE — 77030012965 HC NDL HUBR BBMI -A

## 2019-11-27 PROCEDURE — 86304 IMMUNOASSAY TUMOR CA 125: CPT

## 2019-11-27 PROCEDURE — 36415 COLL VENOUS BLD VENIPUNCTURE: CPT

## 2019-11-27 PROCEDURE — 85025 COMPLETE CBC W/AUTO DIFF WBC: CPT

## 2019-11-27 PROCEDURE — 80053 COMPREHEN METABOLIC PANEL: CPT

## 2019-11-27 RX ORDER — SODIUM CHLORIDE 9 MG/ML
10 INJECTION INTRAMUSCULAR; INTRAVENOUS; SUBCUTANEOUS AS NEEDED
Status: DISCONTINUED | OUTPATIENT
Start: 2019-11-27 | End: 2019-11-28 | Stop reason: HOSPADM

## 2019-11-27 RX ORDER — HEPARIN 100 UNIT/ML
500 SYRINGE INTRAVENOUS AS NEEDED
Status: DISCONTINUED | OUTPATIENT
Start: 2019-11-27 | End: 2019-11-28 | Stop reason: HOSPADM

## 2019-11-27 RX ORDER — SODIUM CHLORIDE 0.9 % (FLUSH) 0.9 %
5-10 SYRINGE (ML) INJECTION AS NEEDED
Status: DISCONTINUED | OUTPATIENT
Start: 2019-11-27 | End: 2019-11-28 | Stop reason: HOSPADM

## 2019-11-27 RX ADMIN — Medication 500 UNITS: at 16:23

## 2019-11-27 RX ADMIN — SODIUM CHLORIDE 10 ML: 9 INJECTION INTRAMUSCULAR; INTRAVENOUS; SUBCUTANEOUS at 16:23

## 2019-11-27 RX ADMIN — SODIUM CHLORIDE 10 ML: 9 INJECTION INTRAMUSCULAR; INTRAVENOUS; SUBCUTANEOUS at 16:20

## 2019-11-27 NOTE — PROGRESS NOTES
Outpatient Infusion Center Short Visit Progress Note    0901 Pt admit to [facility] for port flush/labs ambulatory in stable condition. Assessment completed. No new concerns voiced. Please review pending lab results in [address] [place] [address]. Patient Vitals for the past 12 hrs:   Temp Pulse Resp BP   11/27/19 1609 97.8 °F (36.6 °C) 82 18 120/71       1620 with positive blood return. Lab drawn, flushed, heparinized and de-accessed per protocol. Medications:  Saline Flush  Heparin Flush    1625 Pt tolerated treatment well. D/c home ambulatory in no distress. Pt aware of next appointment scheduled for 12/26/19.

## 2019-11-28 ENCOUNTER — APPOINTMENT (OUTPATIENT)
Dept: INFUSION THERAPY | Age: 59
End: 2019-11-28
Payer: MEDICARE

## 2019-12-04 ENCOUNTER — OFFICE VISIT (OUTPATIENT)
Dept: GYNECOLOGY | Age: 59
End: 2019-12-04

## 2019-12-04 VITALS
DIASTOLIC BLOOD PRESSURE: 71 MMHG | HEART RATE: 83 BPM | WEIGHT: 275.4 LBS | HEIGHT: 69 IN | SYSTOLIC BLOOD PRESSURE: 131 MMHG | BODY MASS INDEX: 40.79 KG/M2

## 2019-12-04 DIAGNOSIS — R22.2 CHEST WALL MASS: ICD-10-CM

## 2019-12-04 DIAGNOSIS — G89.3 CANCER ASSOCIATED PAIN: ICD-10-CM

## 2019-12-04 DIAGNOSIS — Z79.899 ON ANTINEOPLASTIC CHEMOTHERAPY: ICD-10-CM

## 2019-12-04 DIAGNOSIS — R30.0 DYSURIA: Primary | ICD-10-CM

## 2019-12-04 DIAGNOSIS — C79.51 PAIN DUE TO MALIGNANT NEOPLASM METASTATIC TO BONE (HCC): ICD-10-CM

## 2019-12-04 DIAGNOSIS — C56.9 MALIGNANT NEOPLASM OF OVARY, UNSPECIFIED LATERALITY (HCC): ICD-10-CM

## 2019-12-04 DIAGNOSIS — G89.3 PAIN DUE TO MALIGNANT NEOPLASM METASTATIC TO BONE (HCC): ICD-10-CM

## 2019-12-04 DIAGNOSIS — E66.01 MORBID OBESITY (HCC): ICD-10-CM

## 2019-12-04 DIAGNOSIS — R59.0 LYMPHADENOPATHY, MEDIASTINAL: ICD-10-CM

## 2019-12-04 RX ORDER — OXYCODONE HYDROCHLORIDE 5 MG/1
5 TABLET ORAL
Qty: 45 TAB | Refills: 0 | Status: SHIPPED | OUTPATIENT
Start: 2019-12-04 | End: 2020-01-03

## 2019-12-04 NOTE — PROGRESS NOTES
27 [facility] [address], [address] [facility]  P [phone]  F [phone]    Office Visit    Patient ID:  Name: Severo Phalen  MRN: [id number]  : 1960/59 y.o. Visit date: 2019    LEONID Hernandez is a 63 y.o.  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 The patient's previous treatment procedures include primary Taxol/carbo with retreatment in  and Doxil/Avastin, topotecan, gemzar and now single agent weekly Taxol initiated 18 following progression with chest wall involvement. In 2018, patient did not follow up and self-discontinued her treatment regimen. She presented to the ER on 18 with SOB. Negative workup for PE or MI. Admitted to Hospitalist service from -18. Managed for acute asthma exacerbation, acute FELICIA, and hyperkalemia. The patient was initiated on [place] Fabienne on 19. Underwent repeat CT C/A/P on 19 for purposes of following response to treatment. Patient was tolerating [place] Fabienne well for the first month, but then had a rise in her creatinine. However, she was also found to have hydronephrosis. Underwent right ureteral stent placement on 3/18/19. Restarted Zejula afterwards, although with continued rise in creatinine to >2. Held [place] Fabienne again and restarted Zejula at 100mg/daily on 19. Presents today for follow-up and consideration of continuation of [place] Fabienne. Completed 2700 cGy of radiation to her chest wall mass on 19. Tolerating treatment well. Held for one week in 10/2019 for blood transfusion secondary to anemia. Normal iron and B12 labs. Her Ca-125 has bounced around, but has been steadily declining recently. CT C/A/P on 10/2/19 with improvement in her disease. The pain at her chest wall mass is much improved. Otherwise the patient is really without complaints. Denies change in appetite or bowel habits. Denies vaginal bleeding, hematuria, hematochezia, constipation, diarrhea, nausea, vomiting, CP, SOB, fevers, or chills. CT C/A/P on 10/2/19 with improvement in her disease. Reports some dysuria in the last couple of days. Had ureteral stent exchange on 10/31/19.       OncTx History:  9/21/12: MARAH/BSO/Cytoreductive surgery on 9/21/12 noted carcinomatosis/omental caking. 10/2012-2/2013: 6 cycles Carbo/Taxol  8/2014-11/2014: 6 Cycles carbo/Taxol  3/2015-5/13/15: 3 cycles Avastin/Doxil until progression  7/2015-8/2015 Weekly Topotecan   12/2015-3/2016: Weekly Topotecan  2/2017-9/2017:  Gemzar D1/D8 Q28 days for 6 cycles  4/2018: CT core needle bx chest wall mass: Metastatic high-grade serous carcinoma (see comment)   General Categorization   Positive for malignancy. 5/8/2018-Present: Taxol  A5,9,08; S/P  Cycle #4  8/14/2018. Patient discontinued treatment secondary to side effects and fatigue. 1/23/2019 - Initiated [place] Fabienne. Held do to rising creatinine. However, she was also found to have hydronephrosis. Underwent right ureteral stent placement on 3/18/19. Imaging Review:   CT C/A/P 10/2/19:   FINDINGS:     THYROID: Visualized gland is unremarkable. MEDIASTINUM: Upper left paratracheal node measures 2.2 x 3.4 cm (2, 8),  previously 2.3 x 3.5 cm. Superior prevascular node measures 2.9 x 3.0 cm,  previously 3.5 x 3.8 cm. Poorly delineated subcarinal node measures about 3.4 x  4.8 cm, previously 3.6 x 4.8 cm. Precardiac nodes measure 1.9 x 2.5 cm and 1.7 x  2.4 cm (2, 39), previously 2.1 x 2.7 cm and 2.0 x 2.0 cm. SWATI: No mass or lymphadenopathy. THORACIC AORTA: No dissection or aneurysm. MAIN PULMONARY ARTERY: Normal in caliber. TRACHEA/BRONCHI: Patent. ESOPHAGUS: No wall thickening or dilatation. HEART: Normal in size. PLEURA: No effusion or pneumothorax. LUNGS: No nodule, mass, or airspace disease.   LIVER: Multiple hypodense lesions suspicious for metastases, measuring up to 2.5  x 4.0 cm in segment 2 (2, 43) series, previously 2.8 x 3.7 cm, 3.2 x 4.6 cm in  segment 3 (2, 55), previously 3.1 x 4.5 cm, and partially exophytic from the  posterior inferior right lobe segment 6 measuring 2.5 x 3.5 cm, previously 2.8 x  3.4 cm. GALLBLADDER: Unremarkable. SPLEEN: Central 1.9 x 3.1 cm hypodensity (2, 47), previously 1.9 x 2.3 cm. Otherwise unremarkable. PANCREAS: Inseparable from bulky central abdominal adenopathy with no identified  ductal dilation. ADRENALS: Unremarkable. KIDNEYS: Right hydronephrosis has improved status post right nephroureteral  stent placement. Kidneys otherwise appear unremarkable. STOMACH: Unremarkable. SMALL BOWEL: No dilatation or wall thickening. COLON: No dilation or wall thickening. APPENDIX: Unremarkable. PERITONEUM: Extensive peritoneal implants with representative largest lesions  measuring 4.4 x 6.1 cm and the left adnexal region (2, 98), previously 4.3 x 5.7  cm, 3.8 x 5.9 cm in the anterior midline (2, 81), previously 3.4 x 5.4 cm, and  2.8 x 4.7 cm in the right abdomen (2, 78), previously 3.2 x 4.7 cm. RETROPERITONEUM: Extensive rocio hepatis, retroperitoneal, and pelvic sidewall  adenopathy. Conglomerate nkechi mass in the rocio hepatis region measures 5.7 x  11.1 cm (2, 58) is, previously 6.3 x 12.2 cm. Aortocaval node measures 3.7 x 5.3  cm (2, 77), previously 4.5 x 5.4 cm. Right pelvic sidewall node measures 2.9 x  4.2 cm (2, 103), previously 3.4 x 4.2 cm. Left external iliac node measures 3.0  x 4.8 cm (2, 101), previously 3.3 x 4.8 cm. REPRODUCTIVE ORGANS: Uterus and ovaries are surgically absent. URINARY BLADDER: No mass or calculus. Distal end of right nephroureteral stent  positioned within bladder lumen. BONES: Degenerative spine change. No acute fracture or aggressive lesion. ADDITIONAL COMMENTS: Mass centered at the right sternocostal junction measures  5.3 x 5.4 cm (2, 21), previously 4.8 x 5.7 cm.  Post surgical changes in the  anterior abdominal wall. Left Port-A-Cath in place. Bilateral inguinal  adenopathy measuring 2.1 x 3.2 cm on the right (2, 115), previously 2.3 x 3.7 cm  and 2.7 x 2.8 cm on the left (2, 108), previously 2.9 x 3.1 cm. .  IMPRESSION  IMPRESSION: Overall response to disease with either decrease in size or no  significant change in extensive nkechi metastases, hepatic metastases, right  sternal metastasis, and peritoneal carcinomatosis as above. No new metastasis or  progressive metastasis identified. Status post right nephroureteral stent with  interval resolution of right hydronephrosis. CT C/A/P 2/5/19:  FINDINGS:   THYROID: No nodule. MEDIASTINUM: Mediastinal lymphadenopathy is unchanged. A reference superior  mediastinal lymph node is stable measuring 3.3 cm. SWATI: No mass or lymphadenopathy. THORACIC AORTA: No dissection or aneurysm. MAIN PULMONARY ARTERY: Normal in caliber. TRACHEA/BRONCHI: Patent. ESOPHAGUS: No wall thickening or dilatation. HEART: Normal in size. PLEURA: No effusion or pneumothorax. LUNGS: No nodule, mass, or airspace disease. LIVER: Evaluation of the liver is limited by lack of intravenous contrast. There  is a vague 4.4 cm hypodense lesion in the left hepatic lobe previously measuring  3.0 cm. Lesion in the right hepatic lobe now measures 3.3 cm, previously  measuring 2.7 cm. GALLBLADDER: Unremarkable. SPLEEN: Central soft tissue abnormality has increased in size, now measuring 4.9  cm, previously measuring 3.1 cm. PANCREAS: Not well evaluated given the massive lymphadenopathy and lack of  intravenous contrast.  ADRENALS: Unremarkable. KIDNEYS: There is no moderate right hydronephrosis. STOMACH: Unremarkable. SMALL BOWEL: No dilatation or wall thickening. COLON: No dilatation or wall thickening. APPENDIX: Not visualized. PERITONEUM: Bulky gastrohepatic, periceliac, portacaval, and mesenteric  lymphadenopathy has increased. There is secondary invasion of the liver.  Maximum  dimension in the upper abdomen is currently 10.4 cm, previously 9.3 cm. Elemental soft tissue nodules have increased in size, with a reference 7.0 cm  nodule in the left abdomen previously measuring 5.5 cm. Soft tissue nodule in  the deep pelvis between the bladder and colon has increased in size as well. RETROPERITONEUM: No lymphadenopathy or aortic aneurysm. REPRODUCTIVE ORGANS: The uterus and ovaries are surgically absent. URINARY BLADDER: No mass or calculus. BONES: Soft tissue mass involving the right sternum has not changed. Degenerative changes are seen in the thoracic and lumbar spine. ADDITIONAL COMMENTS: N/A  IMPRESSION  IMPRESSION:  Stable mediastinal lymphadenopathy. Progressive abdominal lymphadenopathy as  described above. Liver lesions have increased in size compared to the prior  exam. Stable right sternal mass. Moderate right hydroureteronephrosis is now  noted which appears to be related to the lymphadenopathy. ROS  Constitutional: no weight loss, fever, night sweats  Respiratory: no cough, shortness of breath, or wheezing  Cardiovascular: no chest pain or dyspnea on exertion. Reports chest wall mass smaller, no associated pain. Heme: No abnormal bleeding  Gastrointestinal: no abdominal pain, change in bowel habits, or black or bloody stools  Genito-Urinary: no dysuria, trouble voiding, or hematuria  Musculoskeletal: + swelling in ankle - bilateral, mostly only at end of day  Neurological: mild neuropathy  Derm: pigmentation/induration medial left leg. Prior injury from fall. Psych: positive for depression - controlled        PMH:  Past Medical History:   Diagnosis Date    Adverse effect of anesthesia     sleep apnea cpap machine useage     Anemia     Arthritis     DDD low back and knees    Asthma     uses albuterol prn only; none x 6 mos.   Never hospitalized    BRCA negative 1/2013    Calculus of kidney     Chronic kidney disease     kidney stones    Chronic pain     knee pain bilat    CINV (chemotherapy-induced nausea and vomiting) 2014    Decreased hearing, right     PATIENT STATES THIS IS DUE TO CHEMOTHERAPY    Diabetes ([facility] [address] Age 46    IDDM    Environmental allergies     Fibromyalgia     GERD (gastroesophageal reflux disease)     controlled with med    Hx of pulmonary embolus during pregnancy 2015    Hydronephrosis due to obstruction of ureter 3/18/2019    Hypertension     Ill-defined condition     Sepsis  -  SOURCE PORT    Ill-defined condition 2016    chemotherapy     Mass of chest wall 2018    Morbid obesity (HCC)     Murmur, heart     Other and unspecified hyperlipidemia     Ovarian cancer ([address] 2012, 2014    serous, high grade, stage IIIc. s/p chemotx (has port)    Psychiatric disorder     Depression/Anxiety    Rectal bleeding     Thromboembolus ([address]     POST PARTUM; resolved- PELVIS    Thyroid disease     HYPOTHYROID    Unspecified sleep apnea     CPAP, Dr. Zachary Fuller     PSH:  Past Surgical History:   Procedure Laterality Date    BREAST SURGERY PROCEDURE UNLISTED      Lymph node in left breast 2017   [place]     X2    HX HYSTERECTOMY  2012    ROULA/BSO, tumor debulking, omentectomy for ovarian cancer    HX ORTHOPAEDIC Right     ankle-FX, R    HX ORTHOPAEDIC Right     FX R ARM    HX UROLOGICAL Right     STENT FOR KIDNEY STONES    HX VASCULAR ACCESS  2015    right chest- port placed    HX VASCULAR ACCESS Left 2017    TETE CATH     SOC:  Social History     Socioeconomic History    Marital status:      Spouse name: Not on file    Number of children: Not on file    Years of education: Not on file    Highest education level: Not on file   Occupational History    Not on file   Social Needs    Financial resource strain: Not on file    Food insecurity:     Worry: Not on file     Inability: Not on file    Transportation needs:     Medical: Not on file     Non-medical: Not on file   Tobacco Use  Smoking status: Never Smoker    Smokeless tobacco: Never Used   Substance and Sexual Activity    Alcohol use: Yes     Comment: 1 drink per month    Drug use: No    Sexual activity: Yes   Lifestyle    Physical activity:     Days per week: Not on file     Minutes per session: Not on file    Stress: Not on file   Relationships    Social connections:     Talks on phone: Not on file     Gets together: Not on file     Attends Synagogue service: Not on file     Active member of club or organization: Not on file     Attends meetings of clubs or organizations: Not on file     Relationship status: Not on file    Intimate partner violence:     Fear of current or ex partner: Not on file     Emotionally abused: Not on file     Physically abused: Not on file     Forced sexual activity: Not on file   Other Topics Concern    Not on file   Social History Narrative    Lives in [facility] alone.  passed away in 5/16 of a heart attack. Has 2 sons. Disability. 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 Enjoys swimming and going to the gym.      Family History  Family History   Problem Relation Age of Onset    Hypertension Mother     Arthritis-osteo Mother     Hypertension Father     Arthritis-osteo Father     Cancer Father         PROSTATE    COPD Father     Hypertension Brother     Elevated Lipids Brother     Arthritis-osteo Brother     Hypertension Brother     Elevated Lipids Brother     Obesity Brother     Cancer Brother         PROSTATE    Anesth Problems Son         DELAYED AWAKENING    Diabetes Maternal Grandmother     Anesth Problems Paternal Grandmother         PATIENT [address], [address] STOPPED DURING SURGERY     Medications: (reviewed)  Current Outpatient Medications on File Prior to Visit   Medication Sig Dispense Refill    dextroamphetamine-amphetamine (ADDERALL) 20 mg tablet TAKE 1 TABLET BY MOUTH EVERY DAY  0    rosuvastatin (CRESTOR) 10 mg tablet TAKE 1 TABLET BY MOUTH EVERY DAY  0    fluticasone propionate (FLONASE) 50 mcg/actuation nasal spray 2 SPRAYS IN BOTH NOSTRILS DAILY 1 Bottle 2    telmisartan (MICARDIS) 20 mg tablet TAKE 1 TABLET BY MOUTH EVERY DAY 90 Tab 3    atorvastatin (LIPITOR) 20 mg tablet       BD ULTRA-FINE SHORT PEN NEEDLE 31 gauge x 5/16\" ndle USE TO INJECT INSULIN 5 TIMES DAILY 200 Pen Needle 11    benzonatate (TESSALON) 200 mg capsule Take 1 Cap by mouth three (3) times daily as needed for Cough. 30 Cap 0    insulin aspart U-100 (NOVOLOG FLEXPEN U-100 INSULIN) 100 unit/mL (3 mL) inpn INJECT 20 UNITS BEFORE EACH MEAL + 4 UNITS FOR EVERY 50 MG/DL ABOVE 150 MG/DL.  UNITS PER DAY 90 Adjustable Dose Pre-filled Pen Syringe 3    LEVEMIR FLEXTOUCH U-100 INSULN 100 unit/mL (3 mL) inpn INJECT 30 UNITS IN AM AND 50 UNITS AT NIGHT. INCREASE AS DIRECTED TO  UNITS/DAY. 90 Adjustable Dose Pre-filled Pen Syringe 3    VICTOZA 3-ERNESTO 0.6 mg/0.1 mL (18 mg/3 mL) pnij INJECT 1.8 MG BY SUBCUTANEOUS ROUTE DAILY (WITH LUNCH). 27 Adjustable Dose Pre-filled Pen Syringe 3    DEXILANT 60 mg CpDB capsule (delayed release) TAKE 1 CAPSULE BY MOUTH EVERY DAY 90 Cap 3    omega-3 acid ethyl esters (LOVAZA) 1 gram capsule TAKE 1 CAPSULE BY MOUTH TWICE A  Cap 2    LINZESS 145 mcg cap capsule TAKE 1 CAPSULE BY MOUTH EVERY DAYY 30 Cap 0    ondansetron (ZOFRAN ODT) 4 mg disintegrating tablet Take 1 Tab by mouth every eight (8) hours as needed for Nausea. Indications: Nausea and Vomiting caused by Cancer Drugs 30 Tab 2    levothyroxine (SYNTHROID) 150 mcg tablet TAKE 1 TABLET BY MOUTH EVERY DAY BEFORE BREAKFAST 90 Tab 0    EPINEPHrine (EPIPEN) 0.3 mg/0.3 mL injection INJECT 0.3 ML BY INTRAMUSCULAR ROUTE ONCE AS NEEDED FOR UP TO 1 DOSE.  1    ZEJULA 100 mg cap Take 100 mg by mouth two (2) times a day.       fluticasone propionate (FLONASE) 50 mcg/actuation nasal spray       amoxicillin (AMOXIL) 500 mg capsule Prior to dental procedures      glucose blood VI test strips (TRUE METRIX GLUCOSE TEST STRIP) strip by Does Not Apply route See Admin Instructions. 30 Strip 5    lancets misc by Does Not Apply route daily. True Metrix lancets- test blood sugar once per day 30 Each 5    Blood-Glucose Meter monitoring kit Check up to tid, as directed 1 Kit 0    azelastine (ASTELIN) 137 mcg (0.1 %) nasal spray 1 [place] by Both Nostrils route daily as needed. Use in each nostril as directed       insulin detemir U-100 (LEVEMIR) 100 unit/mL injection 20 Units by SubCUTAneous route daily (with lunch).  montelukast (SINGULAIR) 10 mg tablet Take 10 mg by mouth nightly.  ascorbic acid, vitamin C, (VITAMIN C) 1,000 mg tablet Take 1,000 mg by mouth daily.  albuterol (PROVENTIL VENTOLIN) 2.5 mg /3 mL (0.083 %) nebulizer solution 2.5 mg by Nebulization route every four (4) hours as needed for Wheezing.  potassium chloride (KLOR-CON M10) 10 mEq tablet TAKE 1 TAB BY MOUTH TWO (2) DAYS A WEEK. WED AND FRI 90 Tab 1    Liraglutide (VICTOZA) 0.6 mg/0.1 mL (18 mg/3 mL) pnij 1.8 mg by SubCUTAneous route daily (with lunch). 3 Pen 3    SYMBICORT 160-4.5 mcg/actuation HFAA Take 2 Puffs by inhalation two (2) times daily as needed. 2 Inhaler 3    clonazePAM (KLONOPIN) 0.5 mg tablet Take 0.5 mg by mouth nightly as needed.  sertraline (ZOLOFT) 50 mg tablet TAKE 1 TABLET BY MOUTH as needed  1    cholecalciferol, VITAMIN D3, (VITAMIN D3) 5,000 unit tab tablet Take 5,000 Units by mouth daily.  furosemide (LASIX) 40 mg tablet Take 40 mg by mouth every other day. 3     No current facility-administered medications on file prior to visit. Allergies: (reviewed)  Allergies   Allergen Reactions    Carboplatin Other (comments)     Patient developed shortness of breath, felt faint, coughing, skin flushed and \"itchy\". This occurred on the patients 8th treatment.     Iodinated Contrast Media Rash     13 hr pre-medication prior to IV contrast.   Patient has done well with 1 hr pre-medication of (Solu-Medrol) 40mg &  (Benadryl) 50mg.  Lipitor [Atorvastatin] Myalgia    Shellfish Containing Products Hives    Tape [Adhesive] Itching and Other (comments)     \"op site-- clear,thin tape\"--caused blister     Tomato Hives    Olmesartan Other (comments)    Losartan Other (comments)     headaches    Percocet [Oxycodone-Acetaminophen] Other (comments)     Fever; however, current home med       OBJECTIVE  Physical Exam  Visit Vitals  /71 (BP 1 Location: Left arm, BP Patient Position: Sitting)   Pulse 83   Ht 5' 9\" (1.753 m)   Wt 275 lb 6.4 oz (124.9 kg)   LMP 09/07/2011   BMI 40.67 kg/m²     Physical Exam:  General: Alert and oriented. No acute distress. Well-nourished  HEENT: No thyroid enlargment. Neck supple without restrictions. Sclera normal. Normal occular motion. Moist mucous membranes. Lymphatics: No evidence of axillary, cervical, or subclavicular adenopathy. Respiratory: clear to auscultation and percussion to the bases. No CVAT. Chest wall mass is still palpable along the superior sternum at the level of the superior aspect of the breasts, but improved. Normal overlying skin  Cardiovascular: regular rate and rhythm. No murmurs, rubs, or gallops. Gastrointestinal: soft, non-tender, non-distended, no masses or organomegaly. Well-healed incision. Musculoskeletal: normal gait. No joint tenderness, deformity or swelling. No muscular tenderness. Extremities: extremities normal, atraumatic, mild 1+ edema bilaterally. Pelvic: deferred today  Neuro: Grossly intact. Normal gait and movement. No acute deficit  Skin: No evidence of rashes or skin changes.      DATE REVIEW as available:  Lab Results   Component Value Date/Time    WBC 6.5 11/27/2019 04:14 PM    Hemoglobin (POC) 10.5 (L) 03/20/2017 12:15 PM    HGB 8.0 (L) 11/27/2019 04:14 PM    Hematocrit (POC) 31 (L) 03/20/2017 12:15 PM    HCT 26.9 (L) 11/27/2019 04:14 PM    PLATELET 395 15/95/2594 04:14 PM    MCV 98.2 11/27/2019 04:14 PM     Lab Results   Component Value Date/Time    ABS. NEUTROPHILS 3.8 11/27/2019 04:14 PM     Lab Results   Component Value Date/Time    Sodium 139 11/27/2019 04:14 PM    Potassium 4.5 11/27/2019 04:14 PM    Chloride 111 (H) 11/27/2019 04:14 PM    CO2 22 11/27/2019 04:14 PM    Anion gap 6 11/27/2019 04:14 PM    Glucose 123 (H) 11/27/2019 04:14 PM    BUN 34 (H) 11/27/2019 04:14 PM    Creatinine 1.79 (H) 11/27/2019 04:14 PM    BUN/Creatinine ratio 19 11/27/2019 04:14 PM    GFR est AA 35 (L) 11/27/2019 04:14 PM    GFR est non-AA 29 (L) 11/27/2019 04:14 PM    Calcium 9.2 11/27/2019 04:14 PM    Bilirubin, total 0.4 11/27/2019 04:14 PM    AST (SGOT) 42 (H) 11/27/2019 04:14 PM    Alk. phosphatase 97 11/27/2019 04:14 PM    Protein, total 8.7 (H) 11/27/2019 04:14 PM    Albumin 3.6 11/27/2019 04:14 PM    Globulin 5.1 (H) 11/27/2019 04:14 PM    A-G Ratio 0.7 (L) 11/27/2019 04:14 PM    ALT (SGPT) 28 11/27/2019 04:14 PM       ECHO 7/17/18  Left ventricle: Systolic function was normal. Ejection fraction was estimated in the range of 55 % to 60 %. There were no regional wall motion  abnormalities. Wall thickness was mildly increased. Left atrium: The atrium was mildly dilated. Mitral valve: There was mild regurgitation. Aortic valve: Leaflets exhibited sclerosis without stenosis. Not well visualized. IMPRESSION AND PLAN:  Ms. Maylin Campos is a 63 y.o. female with recurrent, metastatic ovarian cancer. Heavily pre-treated. Lost to follow-up since 9/2018, at which time patient self-discontinued weekly paclitaxel. Initiated [place] on 1/23/19. Held after 1 month secondary to rising creatinine. Found to have right hydronephrosis. Right ureteral stent placed 3/18/19 with normalization of creatinine. Completed 2700 cGy to the sternun 7/11/19. Restarted Zejula at 100mg/daily on 4/23/19.  ECOG 1    Problem List Items Addressed This Visit        Immune    Lymphadenopathy, mediastinal       Reproductive    Ovarian cancer ([facility] [address]    Relevant Medications    oxyCODONE IR (ROXICODONE) 5 mg immediate release tablet       Other    Morbid obesity ([facility] [address]    Chest wall mass    On antineoplastic chemotherapy    Pain due to malignant neoplasm metastatic to bone [facility])      Other Visit Diagnoses     Dysuria    -  Primary    Relevant Orders    URINALYSIS W/ RFLX MICROSCOPIC    CULTURE, URINE    Cancer associated pain        Relevant Medications    oxyCODONE IR (ROXICODONE) 5 mg immediate release tablet          Reviewed patient's course to date. Tolerating Zejula 100mg/daily. Creatinine stable. Ca-125 trending down/stable. CT C/A/P on 10/2/19 with improvement and stability of disease burden. Stable Hgb after transfusion on 10/21/19. Normal iron and B12 studies. Restarted Zejula 11/2019    Again noted that while the patient is BRCA negative, there is some evidence that PARPi may provide some benefit to BRCA wild-type patients. Previously I discussed goals of care, including that any type of treatment at this time is palliative in nature. I have reviewed her chemotherapy history, which is extensive; although the patient has been somewhat non-compliant with treatments with missed treatments \"here and there\". Stressed importance of remaining on treatment. Previously discussed that palliative therapy at this time has a response rate at best of 5-10% regardless of the type of treatment. Counseled patient regarding code status and advance care directives, which the patient is going to consider. She reports she has a \"living will\", but I am unsure as to her wishes regarding code status. Will plan to continue this dialogue at future visits. All questions and concerns were addressed with the patient and she is comfortable with the plan.     Mauro Peng MD

## 2019-12-04 NOTE — PROGRESS NOTES
One month follow up for Slime Teran, had labs drawn on 11/27/19, HGB was 8.0 on 11/27/19, pt had a transfusion on 10/21/19, pt complains of abdominal pain 6/10 on pain scale, \"I think I may be getting another UTI\"    1. Have you been to the ER, urgent care clinic since your last visit? Hospitalized since your last visit?  no    2. Have you seen or consulted any other health care providers outside of the [address] [place] [address] since your last visit? Include any pap smears or colon screening. no

## 2019-12-11 ENCOUNTER — TELEPHONE (OUTPATIENT)
Dept: GYNECOLOGY | Age: 59
End: 2019-12-11

## 2019-12-11 LAB
APPEARANCE UR: ABNORMAL
BACTERIA #/AREA URNS HPF: ABNORMAL /[HPF]
BACTERIA UR CULT: ABNORMAL
BILIRUB UR QL STRIP: NEGATIVE
CASTS URNS QL MICRO: ABNORMAL /LPF
COLOR UR: YELLOW
EPI CELLS #/AREA URNS HPF: ABNORMAL /HPF (ref 0–10)
GLUCOSE UR QL: NEGATIVE
HGB UR QL STRIP: ABNORMAL
KETONES UR QL STRIP: NEGATIVE
LEUKOCYTE ESTERASE UR QL STRIP: ABNORMAL
MICRO URNS: ABNORMAL
NITRITE UR QL STRIP: NEGATIVE
PH UR STRIP: 5.5 [PH] (ref 5–7.5)
PROT UR QL STRIP: ABNORMAL
RBC #/AREA URNS HPF: ABNORMAL /HPF (ref 0–2)
SP GR UR: 1.02 (ref 1–1.03)
UROBILINOGEN UR STRIP-MCNC: 0.2 MG/DL (ref 0.2–1)
WBC #/AREA URNS HPF: >30 /HPF (ref 0–5)

## 2019-12-11 NOTE — LETTER
12/13/2019 9:56 AM 
 
[facility] [id number] To Whom It May Concern: Abelino Clark is currently under the care of [address] for Ovarian Cancer. Ms. Monster Jones ovarian cancer is stable and there is no contraindication for her knee replacement from a gynecologic oncology standpoint. If there are questions or concerns please have the patient contact our office.  
 
 
 
Sincerely, 
 
 
 
Cassia Wild MD

## 2019-12-12 ENCOUNTER — TELEPHONE (OUTPATIENT)
Dept: GYNECOLOGY | Age: 59
End: 2019-12-12

## 2019-12-12 NOTE — TELEPHONE ENCOUNTER
I spoke with patient, she states she is scheduled to have a knee replacement with Dr. Mallory Ramos at [facility] on 1/17/2020. Dr. Nik Lundberg states he needs a letter from our office stating from our standpoint she is cleared to have the knee replacement. His office number is [phone] if needed.

## 2019-12-13 NOTE — TELEPHONE ENCOUNTER
I spoke with Dr. Kaleigh Stein, he states pt's cancer is stable and there is no contraindication for knee replacement from a gyn onc standpoint.   I will write a letter and fax it to Dr. Browning [facility] office [phone]

## 2019-12-16 RX ORDER — SODIUM CHLORIDE 0.9 % (FLUSH) 0.9 %
5-10 SYRINGE (ML) INJECTION AS NEEDED
Status: CANCELLED | OUTPATIENT
Start: 2019-12-26

## 2019-12-16 RX ORDER — HEPARIN 100 UNIT/ML
500 SYRINGE INTRAVENOUS AS NEEDED
Status: CANCELLED | OUTPATIENT
Start: 2019-12-26

## 2019-12-16 RX ORDER — SODIUM CHLORIDE 9 MG/ML
10 INJECTION INTRAMUSCULAR; INTRAVENOUS; SUBCUTANEOUS AS NEEDED
Status: CANCELLED | OUTPATIENT
Start: 2019-12-26

## 2019-12-16 NOTE — PROGRESS NOTES
Keane Simmonds,     Ms. Cathleen Tamez's urine culture is dirty. I suspect she is just colonized given her history. However, if she is symptomatic, we will need to send her in an antibiotic.      Thanks,    Payal Moser MD

## 2019-12-16 NOTE — PROGRESS NOTES
Pranay Silva Ms. Cathleen JAQUAN Tamez's urine culture is dirty. I suspect she is just colonized given her history. However, if she is symptomatic, we will need to send her in an antibiotic.      Thanks,    Deo Pathak MD

## 2019-12-19 RX ORDER — CIPROFLOXACIN 500 MG/1
500 TABLET ORAL 2 TIMES DAILY
Qty: 14 TAB | Refills: 0 | Status: SHIPPED | OUTPATIENT
Start: 2019-12-19 | End: 2020-01-13 | Stop reason: ALTCHOICE

## 2019-12-20 ENCOUNTER — OFFICE VISIT (OUTPATIENT)
Dept: CARDIOLOGY CLINIC | Age: 59
End: 2019-12-20

## 2019-12-20 VITALS
RESPIRATION RATE: 18 BRPM | BODY MASS INDEX: 41.18 KG/M2 | SYSTOLIC BLOOD PRESSURE: 138 MMHG | OXYGEN SATURATION: 97 % | DIASTOLIC BLOOD PRESSURE: 80 MMHG | WEIGHT: 278 LBS | HEART RATE: 97 BPM | HEIGHT: 69 IN

## 2019-12-20 DIAGNOSIS — I10 BENIGN ESSENTIAL HYPERTENSION: ICD-10-CM

## 2019-12-20 DIAGNOSIS — Z01.810 PRE-OPERATIVE CARDIOVASCULAR EXAMINATION: Primary | ICD-10-CM

## 2019-12-20 DIAGNOSIS — C80.0 CARCINOMATOSIS (HCC): ICD-10-CM

## 2019-12-20 DIAGNOSIS — R22.2 CHEST MASS: ICD-10-CM

## 2019-12-20 DIAGNOSIS — T45.1X5A ANEMIA ASSOCIATED WITH CHEMOTHERAPY: ICD-10-CM

## 2019-12-20 DIAGNOSIS — C56.9 MALIGNANT NEOPLASM OF OVARY, UNSPECIFIED LATERALITY (HCC): ICD-10-CM

## 2019-12-20 DIAGNOSIS — F32.9 REACTIVE DEPRESSION: ICD-10-CM

## 2019-12-20 DIAGNOSIS — Z96.0 S/P URETERAL STENT PLACEMENT: ICD-10-CM

## 2019-12-20 DIAGNOSIS — C79.51 PAIN DUE TO MALIGNANT NEOPLASM METASTATIC TO BONE (HCC): ICD-10-CM

## 2019-12-20 DIAGNOSIS — G89.3 PAIN DUE TO MALIGNANT NEOPLASM METASTATIC TO BONE (HCC): ICD-10-CM

## 2019-12-20 DIAGNOSIS — R06.09 DOE (DYSPNEA ON EXERTION): ICD-10-CM

## 2019-12-20 DIAGNOSIS — E66.01 MORBID OBESITY (HCC): ICD-10-CM

## 2019-12-20 DIAGNOSIS — E11.9 CONTROLLED TYPE 2 DIABETES MELLITUS WITHOUT COMPLICATION, WITH LONG-TERM CURRENT USE OF INSULIN (HCC): ICD-10-CM

## 2019-12-20 DIAGNOSIS — I27.20 PULMONARY HYPERTENSION, MILD (HCC): ICD-10-CM

## 2019-12-20 DIAGNOSIS — N18.30 CKD (CHRONIC KIDNEY DISEASE) STAGE 3, GFR 30-59 ML/MIN (HCC): ICD-10-CM

## 2019-12-20 DIAGNOSIS — D64.81 ANEMIA ASSOCIATED WITH CHEMOTHERAPY: ICD-10-CM

## 2019-12-20 DIAGNOSIS — Z79.4 CONTROLLED TYPE 2 DIABETES MELLITUS WITHOUT COMPLICATION, WITH LONG-TERM CURRENT USE OF INSULIN (HCC): ICD-10-CM

## 2019-12-20 DIAGNOSIS — Z79.899 ON ANTINEOPLASTIC CHEMOTHERAPY: ICD-10-CM

## 2019-12-20 DIAGNOSIS — E78.5 DYSLIPIDEMIA (HIGH LDL; LOW HDL): ICD-10-CM

## 2019-12-20 DIAGNOSIS — E11.65 TYPE 2 DIABETES MELLITUS WITH HYPERGLYCEMIA, WITHOUT LONG-TERM CURRENT USE OF INSULIN (HCC): ICD-10-CM

## 2019-12-20 RX ORDER — CYCLOSPORINE 0.5 MG/ML
EMULSION OPHTHALMIC
Status: ON HOLD | COMMUNITY
Start: 2019-11-12 | End: 2021-01-01

## 2019-12-20 RX ORDER — ALBUTEROL SULFATE 0.83 MG/ML
2.5 SOLUTION RESPIRATORY (INHALATION)
Status: ON HOLD | COMMUNITY
End: 2021-01-01

## 2019-12-20 NOTE — PROGRESS NOTES
[facility]  
[address] [place] [address], [address] Brayden Gamble. [address] NEW PATIENT HPI/FOLLOW-UP 
 
 
NAME:  Romario Antonio :   1960 MRN:   [id number] PCP:  May Bernal MD 
 
    
 
Subjective: The patient is a 63y.o. year old female  who returns for a routine follow-up and cardiac clearance prior to right TKR anticipating grueling recovery/rehab in setting of morbid obesity. PMHx significant for HTN, dyslipidemia with low HDL,IDDM, ovarian cancer since  with mets on chemo and radiation tx. Since the last visit about 2 yrs ago, patient reports more noticeable ECKERT. Had negative nuke and echo unremarkable at that time. Denies exertional chest pain, edema, medication intolerance, palpitations, PND/orthopnea wheezing, sputum, syncope, dizziness or light headedness. Review of Systems General: Pt endorses excessive weight gain. Pt is able to conduct ADL's. Respiratory: Denies shortness of breath, +ECKERT, wheezing or stridor. Cardiovascular: Denies precordial pain, palpitations, edema or PND Gastrointestinal: Denies poor appetite, indigestion, abdominal pain or blood in stool Peripheral vascular: Denies claudication, leg cramps Neuropsychiatric: Denies paresthesias,tingling,numbness,anxiety,depression,fatigue Musculoskeletal: + right knee pain,tenderness, soreness,swelling Past Medical History:  
Diagnosis Date  Adverse effect of anesthesia   
 sleep apnea cpap machine useage  Anemia  Arthritis DDD low back and knees  Asthma   
 uses albuterol prn only; none x 6 mos. Never hospitalized  BRCA negative 2013  Calculus of kidney  Chronic kidney disease   
 kidney stones  Chronic pain   
 knee pain bilat  CINV (chemotherapy-induced nausea and vomiting) 2014  Decreased hearing, right PATIENT STATES THIS IS DUE TO CHEMOTHERAPY  Diabetes ([facility] Age 46 IDDM  Environmental allergies  Fibromyalgia  GERD (gastroesophageal reflux disease)   
 controlled with med  Hx of pulmonary embolus during pregnancy 1/18/2015  Hydronephrosis due to obstruction of ureter 3/18/2019  Hypertension  Ill-defined condition Sepsis 9-2015 -  SOURCE PORT  
 Ill-defined condition 2016  
 chemotherapy  Mass of chest wall 05/2018  Morbid obesity ([address]  Murmur, heart  Other and unspecified hyperlipidemia  Ovarian cancer ([address] 9/2012, 1/2014  
 serous, high grade, stage IIIc. s/p chemotx (has port)  Psychiatric disorder Depression/Anxiety  Rectal bleeding  Thromboembolus ([address] 1993 POST PARTUM; resolved- PELVIS  Thyroid disease HYPOTHYROID  Unspecified sleep apnea CPAP, Dr. Lupe Denise Patient Active Problem List  
 Diagnosis Date Noted  Carcinomatosis ([address] 09/22/2012 Priority: 1 - One  
 Controlled type 2 diabetes mellitus without complication, with long-term current use of insulin ([address] 09/22/2012 Priority: 2 - Two  Morbid obesity ([address] 09/22/2012 Priority: 2 - Two  S/P ureteral stent placement 10/09/2019  Anemia associated with chemotherapy 10/09/2019  Dehydration 11/27/2018  Acute hyperkalemia 11/27/2018  Acute chest pain 11/27/2018  Fever 11/27/2018  Mass of chest wall 11/27/2018  Thrombocytopenia ([address] 11/27/2018  Moderate asthma with acute exacerbation 11/27/2018  Cracked tooth 07/31/2018  Hypertension 07/18/2018  Pain due to malignant neoplasm metastatic to bone ([address] 07/18/2018  Diabetes ([address] 07/17/2018  SOB (shortness of breath) 07/17/2018  Hypothyroidism 07/17/2018  Anemia 07/17/2018  Contrast media allergy 07/09/2018  On antineoplastic chemotherapy 05/31/2018  Reactive depression 05/31/2018  Chest mass 04/10/2018  Chest wall mass 04/10/2018  Type 2 diabetes mellitus with nephropathy ([address] 12/26/2017  Lymphadenopathy, mediastinal 2017  Pulmonary hypertension, mild ([facility] 2017  Renal calculi 2017  Hyperglycemia due to type 2 diabetes mellitus ([facility] 02/15/2017  Ovarian cancer ([facility] 2015  Port-A-Cath in place 2015  Chest pain, atypical 2015  CINV (chemotherapy-induced nausea and vomiting) 2014  Benign essential hypertension 2014  Dyslipidemia (high LDL; low HDL)  SHABANA on CPAP 2014  Abnormal mammogram 01/15/2014 Past Surgical History:  
Procedure Laterality Date  BREAST SURGERY PROCEDURE UNLISTED Lymph node in left breast 2017 Walter  
  X2  
 HX HYSTERECTOMY  2012 ROULA/BSO, tumor debulking, omentectomy for ovarian cancer  HX ORTHOPAEDIC Right   
 ankle-FX, R  
 HX ORTHOPAEDIC Right   
 FX R ARM  
 HX UROLOGICAL Right  STENT FOR KIDNEY STONES  
 HX VASCULAR ACCESS  2015  
 right chest- port placed  HX VASCULAR ACCESS Left 2017 TETE CATH Allergies Allergen Reactions  Carboplatin Other (comments) Patient developed shortness of breath, felt faint, coughing, skin flushed and \"itchy\". This occurred on the patients 8th treatment.  Iodinated Contrast Media Rash 13 hr pre-medication prior to IV contrast.  
Patient has done well with 1 hr pre-medication of (Solu-Medrol) 40mg &  (Benadryl) 50mg.  Lipitor [Atorvastatin] Myalgia  Shellfish Containing Products Hives  Tape [Adhesive] Itching and Other (comments) \"op site-- clear,thin tape\"--caused blister  Tomato Hives  Olmesartan Other (comments)  Losartan Other (comments)  
  headaches  Percocet [Oxycodone-Acetaminophen] Other (comments) Fever; however, current home med Family History Problem Relation Age of Onset  Hypertension Mother De.Fortune Arthritis-osteo Mother  Hypertension Father  Arthritis-osteo Father  Cancer Father PROSTATE  COPD Father  Hypertension Brother  Elevated Lipids Brother  Arthritis-osteo Brother  Hypertension Brother  Elevated Lipids Brother  Obesity Brother  Cancer Brother PROSTATE  Anesth Problems Son DELAYED AWAKENING  
 Diabetes Maternal Grandmother  Anesth Problems Paternal Grandmother PATIENT STATES GRANDMOTHER'S HEART STOPPED DURING SURGERY Social History Socioeconomic History  Marital status:  Spouse name: Not on file  Number of children: Not on file  Years of education: Not on file  Highest education level: Not on file Occupational History  Not on file Social Needs  Financial resource strain: Not on file  Food insecurity:  
  Worry: Not on file Inability: Not on file  Transportation needs:  
  Medical: Not on file Non-medical: Not on file Tobacco Use  Smoking status: Never Smoker  Smokeless tobacco: Never Used Substance and Sexual Activity  Alcohol use: Yes Comment: 1 drink per month  Drug use: No  
 Sexual activity: Yes Lifestyle  Physical activity:  
  Days per week: Not on file Minutes per session: Not on file  Stress: Not on file Relationships  Social connections:  
  Talks on phone: Not on file Gets together: Not on file Attends Jewish service: Not on file Active member of club or organization: Not on file Attends meetings of clubs or organizations: Not on file Relationship status: Not on file  Intimate partner violence:  
  Fear of current or ex partner: Not on file Emotionally abused: Not on file Physically abused: Not on file Forced sexual activity: Not on file Other Topics Concern  Not on file Social History Narrative Lives in [facility] alone.  passed away in 5/16 of a heart attack. Has 2 sons. Disability.   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 Enjoys swimming and going to the gym. Current Outpatient Medications Medication Sig  ciprofloxacin HCl (CIPRO) 500 mg tablet Take 1 Tab by mouth two (2) times a day.  oxyCODONE IR (ROXICODONE) 5 mg immediate release tablet Take 1 Tab by mouth every four (4) hours as needed for Pain for up to 30 days. Max Daily Amount: 30 mg.  
 dextroamphetamine-amphetamine (ADDERALL) 20 mg tablet TAKE 1 TABLET BY MOUTH EVERY DAY  rosuvastatin (CRESTOR) 10 mg tablet TAKE 1 TABLET BY MOUTH EVERY DAY  fluticasone propionate (FLONASE) 50 mcg/actuation nasal spray 2 SPRAYS IN BOTH NOSTRILS DAILY  telmisartan (MICARDIS) 20 mg tablet TAKE 1 TABLET BY MOUTH EVERY DAY  atorvastatin (LIPITOR) 20 mg tablet  BD ULTRA-FINE SHORT PEN NEEDLE 31 gauge x 5/16\" ndle USE TO INJECT INSULIN 5 TIMES DAILY  benzonatate (TESSALON) 200 mg capsule Take 1 Cap by mouth three (3) times daily as needed for Cough.  insulin aspart U-100 (NOVOLOG FLEXPEN U-100 INSULIN) 100 unit/mL (3 mL) inpn INJECT 20 UNITS BEFORE EACH MEAL + 4 UNITS FOR EVERY 50 MG/DL ABOVE 150 MG/DL.  UNITS PER DAY  LEVEMIR FLEXTOUCH U-100 INSULN 100 unit/mL (3 mL) inpn INJECT 30 UNITS IN AM AND 50 UNITS AT NIGHT. INCREASE AS DIRECTED TO  UNITS/DAY.  VICTOZA 3-ERNESTO 0.6 mg/0.1 mL (18 mg/3 mL) pnij INJECT 1.8 MG BY SUBCUTANEOUS ROUTE DAILY (WITH LUNCH).  DEXILANT 60 mg CpDB capsule (delayed release) TAKE 1 CAPSULE BY MOUTH EVERY DAY  omega-3 acid ethyl esters (LOVAZA) 1 gram capsule TAKE 1 CAPSULE BY MOUTH TWICE A DAY  LINZESS 145 mcg cap capsule TAKE 1 CAPSULE BY MOUTH EVERY DAYY  ondansetron (ZOFRAN ODT) 4 mg disintegrating tablet Take 1 Tab by mouth every eight (8) hours as needed for Nausea. Indications: Nausea and Vomiting caused by Cancer Drugs  levothyroxine (SYNTHROID) 150 mcg tablet TAKE 1 TABLET BY MOUTH EVERY DAY BEFORE BREAKFAST  EPINEPHrine (EPIPEN) 0.3 mg/0.3 mL injection INJECT 0.3 ML BY INTRAMUSCULAR ROUTE ONCE AS NEEDED FOR UP TO 1 DOSE.  ZEJULA 100 mg cap Take 100 mg by mouth two (2) times a day.  fluticasone propionate (FLONASE) 50 mcg/actuation nasal spray   
 amoxicillin (AMOXIL) 500 mg capsule Prior to dental procedures  glucose blood VI test strips (TRUE METRIX GLUCOSE TEST STRIP) strip by Does Not Apply route See Admin Instructions.  lancets misc by Does Not Apply route daily. True Metrix lancets- test blood sugar once per day  Blood-Glucose Meter monitoring kit Check up to tid, as directed  azelastine (ASTELIN) 137 mcg (0.1 %) nasal spray 1 [place] by Both Nostrils route daily as needed. Use in each nostril as directed  insulin detemir U-100 (LEVEMIR) 100 unit/mL injection 20 Units by SubCUTAneous route daily (with lunch).  montelukast (SINGULAIR) 10 mg tablet Take 10 mg by mouth nightly.  ascorbic acid, vitamin C, (VITAMIN C) 1,000 mg tablet Take 1,000 mg by mouth daily.  albuterol (PROVENTIL VENTOLIN) 2.5 mg /3 mL (0.083 %) nebulizer solution 2.5 mg by Nebulization route every four (4) hours as needed for Wheezing.  potassium chloride (KLOR-CON M10) 10 mEq tablet TAKE 1 TAB BY MOUTH TWO (2) DAYS A WEEK. WED AND FRI  Liraglutide (VICTOZA) 0.6 mg/0.1 mL (18 mg/3 mL) pnij 1.8 mg by SubCUTAneous route daily (with lunch).  SYMBICORT 160-4.5 mcg/actuation HFAA Take 2 Puffs by inhalation two (2) times daily as needed.  clonazePAM (KLONOPIN) 0.5 mg tablet Take 0.5 mg by mouth nightly as needed.  sertraline (ZOLOFT) 50 mg tablet TAKE 1 TABLET BY MOUTH as needed  cholecalciferol, VITAMIN D3, (VITAMIN D3) 5,000 unit tab tablet Take 5,000 Units by mouth daily.  furosemide (LASIX) 40 mg tablet Take 40 mg by mouth every other day. No current facility-administered medications for this visit. I have reviewed the nurses notes, vitals, problem list, allergy list, medical history, family medical, social history and medications. Objective:  
 
Physical Exam:  
 
Vitals:  
 12/20/19 1312 12/20/19 1326 BP: 128/80 138/80 Pulse: 97 Resp: 18 SpO2: 97% Weight: 278 lb (126.1 kg) Height: 5' 9\" (1.753 m) Body mass index is 41.05 kg/m². General: Well developed,morbidly obese in no acute distress. HEENT: No carotid bruits, no JVD, trach is midline. Heart:  Normal S1/S2 negative S3 or S4. Regular, no murmur, gallop or rub.  
Respiratory: Clear bilaterally, no wheezing or rales Abdomen:   Soft, non-tender, bowel sounds are active.  
Extremities:  No edema, normal cap refill, no cyanosis. Neuro: A&Ox3, speech clear, gait stable. Skin: Skin color is normal. No rashes or lesions. No diaphoresis. Vascular: 2+ pulses symmetric in all extremities Data Review:  
 
 
Cardiographics: 
 
EKG: NSR,WNL Cardiology Labs: 
 
Results for orders placed or performed during the hospital encounter of 11/26/18 EKG, 12 LEAD, INITIAL Result Value Ref Range Ventricular Rate 85 BPM  
 Atrial Rate 85 BPM  
 P-R Interval 154 ms QRS Duration 74 ms Q-T Interval 326 ms  
 QTC Calculation (Bezet) 387 ms Calculated P Axis 42 degrees Calculated R Axis -14 degrees Calculated T Axis 31 degrees Diagnosis Normal sinus rhythm When compared with ECG of 17-JUL-2018 11:48, No significant change was found Confirmed by Georgina Adrian MD ([id number]) on 11/26/2018 9:06:35 PM 
  
 
*Note: Due to a large number of results and/or encounters for the requested time period, some results have not been displayed. A complete set of results can be found in Results Review. Lab Results Component Value Date/Time Cholesterol, total 247 (H) 03/28/2019 09:40 AM  
 HDL Cholesterol 36 (L) 03/28/2019 09:40 AM  
 LDL, calculated 177 (H) 03/28/2019 09:40 AM  
 Triglyceride 168 (H) 03/28/2019 09:40 AM  
 
 
Lab Results Component Value Date/Time  Sodium 139 11/27/2019 04:14 PM  
 Potassium 4.5 11/27/2019 04:14 PM  
 Chloride 111 (H) 11/27/2019 04:14 PM  
 CO2 22 11/27/2019 04:14 PM  
 Anion gap 6 11/27/2019 04:14 PM  
 Glucose 123 (H) 11/27/2019 04:14 PM  
 BUN 34 (H) 11/27/2019 04:14 PM  
 Creatinine 1.79 (H) 11/27/2019 04:14 PM  
 BUN/Creatinine ratio 19 11/27/2019 04:14 PM  
 GFR est AA 35 (L) 11/27/2019 04:14 PM  
 GFR est non-AA 29 (L) 11/27/2019 04:14 PM  
 Calcium 9.2 11/27/2019 04:14 PM  
 Bilirubin, total 0.4 11/27/2019 04:14 PM  
 AST (SGOT) 42 (H) 11/27/2019 04:14 PM  
 Alk. phosphatase 97 11/27/2019 04:14 PM  
 Protein, total 8.7 (H) 11/27/2019 04:14 PM  
 Albumin 3.6 11/27/2019 04:14 PM  
 Globulin 5.1 (H) 11/27/2019 04:14 PM  
 A-G Ratio 0.7 (L) 11/27/2019 04:14 PM  
 ALT (SGPT) 28 11/27/2019 04:14 PM  
  
 
 
Assessment: ICD-10-CM ICD-9-CM 1. Pre-operative cardiovascular examination Z01.810 V72.81 NUCLEAR CARDIAC STRESS TEST  
   ECHO ADULT COMPLETE 2. Benign essential hypertension I10 401.1 AMB POC EKG ROUTINE W/ 12 LEADS, INTER & REP  
   NUCLEAR CARDIAC STRESS TEST  
   ECHO ADULT COMPLETE 3. Dyslipidemia (high LDL; low HDL) E78.5 272.4 AMB POC EKG ROUTINE W/ 12 LEADS, INTER & REP  
   NUCLEAR CARDIAC STRESS TEST  
   ECHO ADULT COMPLETE 4. Morbid obesity (HCC) E66.01 278.01 AMB POC EKG ROUTINE W/ 12 LEADS, INTER & REP  
   NUCLEAR CARDIAC STRESS TEST  
   ECHO ADULT COMPLETE 5. Reactive depression F32.9 300.4 AMB POC EKG ROUTINE W/ 12 LEADS, INTER & REP 6. Type 2 diabetes mellitus with hyperglycemia, without long-term current use of insulin (HCC) E11.65 250.00 AMB POC EKG ROUTINE W/ 12 LEADS, INTER & REP  
  790.29 NUCLEAR CARDIAC STRESS TEST  
   ECHO ADULT COMPLETE  
7. S/P ureteral stent placement Z96.0 V45.89   
8. Carcinomatosis ([address] C80.0 199.0   
9. Malignant neoplasm of ovary, unspecified laterality ([address] with mets C56.9 183.0 10. Pulmonary hypertension, mild (HCC) I27.20 416.8 11.  Controlled type 2 diabetes mellitus without complication, with long-term current use of insulin (HCC) E11.9 250.00   
 Z79.4 V58.67   
12. Pain due to malignant neoplasm metastatic to bone (HCC) G89.3 338.3   
 C79.51    
13. Anemia associated with chemotherapy D64.81 285.3 T45. 1X5A E933.1 14. Chest mass R22.2 786.6 15. On antineoplastic chemotherapy Z79.899 V58.69   
16. ECKERT (dyspnea on exertion) R06.09 786.09   
17. CKD (chronic kidney disease) stage 3, GFR 30-59 ml/min (HCC) N18.3 585. 3 Discussion: Patient presents at this time for cardiac clearance anticipating right TKR. Has noted more ECKERT over time. Has numerous CAD risk factors and co-morbid states. Will obtain repeat NST as Shahid Mondragon as unable to adequately walk due to right knee pain. Will assess cardiac structures given hx of ovarian carcinomatosis/mets,etc.  
 
 
Plan: 1. Continue same meds. Lipid profile and labs followed by PCP. 2.Encouraged to exercise to tolerance, lose weight and follow low fat, low carb,low cholesterol, low sodium predominantly Plant-based (consider Mediterranean) diet. Call with questions or concerns. Will follow up any test results by phone and/or f/u here in office if needed. Mirlande Franco 3.Follow up: 1 year PRN I have discussed the diagnosis with the patient and the intended plan as seen in the above orders. The patient has received an after-visit summary and questions were answered concerning future plans. I have discussed any concerning medication side effects and warnings with the patient as well. Agnelina Suarez MD,FAC,[facility] 
12/20/2019

## 2019-12-20 NOTE — PROGRESS NOTES
Chief Complaint Patient presents with  Surgical Clearance  
  knee procedure 1. Have you been to the ER, urgent care clinic since your last visit? Hospitalized since your last visit? yes 3/19/2019 [address]. Have you seen or consulted any other health care providers outside of the [address] [place] [address] since your last visit? Include any pap smears or colon screening.  No

## 2019-12-26 ENCOUNTER — HOSPITAL ENCOUNTER (OUTPATIENT)
Dept: INFUSION THERAPY | Age: 59
Discharge: HOME | End: 2019-12-26
Payer: MEDICARE

## 2019-12-26 VITALS
SYSTOLIC BLOOD PRESSURE: 125 MMHG | DIASTOLIC BLOOD PRESSURE: 76 MMHG | TEMPERATURE: 98.1 F | HEART RATE: 85 BPM | RESPIRATION RATE: 18 BRPM

## 2019-12-26 DIAGNOSIS — C56.9 MALIGNANT NEOPLASM OF OVARY, UNSPECIFIED LATERALITY (HCC): Primary | ICD-10-CM

## 2019-12-26 LAB
ALBUMIN SERPL-MCNC: 3.4 G/DL (ref 3.5–5)
ALBUMIN/GLOB SERPL: 0.7 {RATIO} (ref 1.1–2.2)
ALP SERPL-CCNC: 98 U/L (ref 45–117)
ALT SERPL-CCNC: 30 U/L (ref 12–78)
ANION GAP SERPL CALC-SCNC: 3 MMOL/L (ref 5–15)
AST SERPL-CCNC: 44 U/L (ref 15–37)
BASOPHILS # BLD: 0.1 K/UL (ref 0–0.1)
BASOPHILS NFR BLD: 1 % (ref 0–1)
BILIRUB SERPL-MCNC: 0.3 MG/DL (ref 0.2–1)
BUN SERPL-MCNC: 36 MG/DL (ref 6–20)
BUN/CREAT SERPL: 19 (ref 12–20)
CALCIUM SERPL-MCNC: 9 MG/DL (ref 8.5–10.1)
CHLORIDE SERPL-SCNC: 114 MMOL/L (ref 97–108)
CO2 SERPL-SCNC: 22 MMOL/L (ref 21–32)
CREAT SERPL-MCNC: 1.89 MG/DL (ref 0.55–1.02)
DIFFERENTIAL METHOD BLD: ABNORMAL
EOSINOPHIL # BLD: 0.4 K/UL (ref 0–0.4)
EOSINOPHIL NFR BLD: 8 % (ref 0–7)
ERYTHROCYTE [DISTWIDTH] IN BLOOD BY AUTOMATED COUNT: 14.3 % (ref 11.5–14.5)
GLOBULIN SER CALC-MCNC: 5.2 G/DL (ref 2–4)
GLUCOSE SERPL-MCNC: 113 MG/DL (ref 65–100)
HCT VFR BLD AUTO: 25.9 % (ref 35–47)
HGB BLD-MCNC: 7.7 G/DL (ref 11.5–16)
IMM GRANULOCYTES # BLD AUTO: 0 K/UL (ref 0–0.04)
IMM GRANULOCYTES NFR BLD AUTO: 0 % (ref 0–0.5)
LYMPHOCYTES # BLD: 1 K/UL (ref 0.8–3.5)
LYMPHOCYTES NFR BLD: 20 % (ref 12–49)
MCH RBC QN AUTO: 29.4 PG (ref 26–34)
MCHC RBC AUTO-ENTMCNC: 29.7 G/DL (ref 30–36.5)
MCV RBC AUTO: 98.9 FL (ref 80–99)
MONOCYTES # BLD: 0.6 K/UL (ref 0–1)
MONOCYTES NFR BLD: 13 % (ref 5–13)
NEUTS SEG # BLD: 2.8 K/UL (ref 1.8–8)
NEUTS SEG NFR BLD: 58 % (ref 32–75)
NRBC # BLD: 0 K/UL (ref 0–0.01)
NRBC BLD-RTO: 0 PER 100 WBC
PLATELET # BLD AUTO: 139 K/UL (ref 150–400)
PMV BLD AUTO: 9.6 FL (ref 8.9–12.9)
POTASSIUM SERPL-SCNC: 4.6 MMOL/L (ref 3.5–5.1)
PROT SERPL-MCNC: 8.6 G/DL (ref 6.4–8.2)
RBC # BLD AUTO: 2.62 M/UL (ref 3.8–5.2)
SODIUM SERPL-SCNC: 139 MMOL/L (ref 136–145)
WBC # BLD AUTO: 4.8 K/UL (ref 3.6–11)

## 2019-12-26 PROCEDURE — 80053 COMPREHEN METABOLIC PANEL: CPT

## 2019-12-26 PROCEDURE — 85025 COMPLETE CBC W/AUTO DIFF WBC: CPT

## 2019-12-26 PROCEDURE — 36591 DRAW BLOOD OFF VENOUS DEVICE: CPT

## 2019-12-26 PROCEDURE — 74011250636 HC RX REV CODE- 250/636: Performed by: OBSTETRICS & GYNECOLOGY

## 2019-12-26 PROCEDURE — 36415 COLL VENOUS BLD VENIPUNCTURE: CPT

## 2019-12-26 PROCEDURE — 77030012965 HC NDL HUBR BBMI -A

## 2019-12-26 RX ORDER — SODIUM CHLORIDE 9 MG/ML
10 INJECTION INTRAMUSCULAR; INTRAVENOUS; SUBCUTANEOUS AS NEEDED
Status: DISCONTINUED | OUTPATIENT
Start: 2019-12-26 | End: 2019-12-27 | Stop reason: HOSPADM

## 2019-12-26 RX ORDER — SODIUM CHLORIDE 0.9 % (FLUSH) 0.9 %
5-10 SYRINGE (ML) INJECTION AS NEEDED
Status: DISCONTINUED | OUTPATIENT
Start: 2019-12-26 | End: 2019-12-27 | Stop reason: HOSPADM

## 2019-12-26 RX ORDER — HEPARIN 100 UNIT/ML
500 SYRINGE INTRAVENOUS AS NEEDED
Status: DISCONTINUED | OUTPATIENT
Start: 2019-12-26 | End: 2019-12-27 | Stop reason: HOSPADM

## 2019-12-26 RX ADMIN — Medication 500 UNITS: at 16:10

## 2019-12-26 RX ADMIN — Medication 10 ML: at 16:10

## 2019-12-26 RX ADMIN — SODIUM CHLORIDE 10 ML: 9 INJECTION INTRAMUSCULAR; INTRAVENOUS; SUBCUTANEOUS at 16:15

## 2019-12-26 NOTE — PROGRESS NOTES
Outpatient Infusion Center Short Visit Progress Note    7045 Pt admit to [facility] for port flush/lab draw ambulatory in stable condition. Assessment completed. No new concerns voiced. Patient Vitals for the past 12 hrs:   Temp Pulse Resp BP   12/26/19 1600 98.1 °F (36.7 °C) 85 18 125/76       Port with positive blood return. Lab drawn, flushed, heparinized and de-accessed per protocol. 1615 Pt tolerated treatment well. D/c home ambulatory in no distress. Pt aware of next appointment. Altaf Martinez RN    Recent Results (from the past 12 hour(s))   CBC WITH AUTOMATED DIFF    Collection Time: 12/26/19  4:06 PM   Result Value Ref Range    WBC 4.8 3.6 - 11.0 K/uL    RBC 2.62 (L) 3.80 - 5.20 M/uL    HGB 7.7 (L) 11.5 - 16.0 g/dL    HCT 25.9 (L) 35.0 - 47.0 %    MCV 98.9 80.0 - 99.0 FL    MCH 29.4 26.0 - 34.0 PG    MCHC 29.7 (L) 30.0 - 36.5 g/dL    RDW 14.3 11.5 - 14.5 %    PLATELET 086 (L) 726 - 400 K/uL    MPV 9.6 8.9 - 12.9 FL    NRBC 0.0 0  WBC    ABSOLUTE NRBC 0.00 0.00 - 0.01 K/uL    NEUTROPHILS 58 32 - 75 %    LYMPHOCYTES 20 12 - 49 %    MONOCYTES 13 5 - 13 %    EOSINOPHILS 8 (H) 0 - 7 %    BASOPHILS 1 0 - 1 %    IMMATURE GRANULOCYTES 0 0.0 - 0.5 %    ABS. NEUTROPHILS 2.8 1.8 - 8.0 K/UL    ABS. LYMPHOCYTES 1.0 0.8 - 3.5 K/UL    ABS. MONOCYTES 0.6 0.0 - 1.0 K/UL    ABS. EOSINOPHILS 0.4 0.0 - 0.4 K/UL    ABS. BASOPHILS 0.1 0.0 - 0.1 K/UL    ABS. IMM.  GRANS. 0.0 0.00 - 0.04 K/UL    DF AUTOMATED     METABOLIC PANEL, COMPREHENSIVE    Collection Time: 12/26/19  4:06 PM   Result Value Ref Range    Sodium 139 136 - 145 mmol/L    Potassium 4.6 3.5 - 5.1 mmol/L    Chloride 114 (H) 97 - 108 mmol/L    CO2 22 21 - 32 mmol/L    Anion gap 3 (L) 5 - 15 mmol/L    Glucose 113 (H) 65 - 100 mg/dL    BUN 36 (H) 6 - 20 MG/DL    Creatinine 1.89 (H) 0.55 - 1.02 MG/DL    BUN/Creatinine ratio 19 12 - 20      GFR est AA 33 (L) >60 ml/min/1.73m2    GFR est non-AA 27 (L) >60 ml/min/1.73m2    Calcium 9.0 8.5 - 10.1 MG/DL Bilirubin, total 0.3 0.2 - 1.0 MG/DL    ALT (SGPT) 30 12 - 78 U/L    AST (SGOT) 44 (H) 15 - 37 U/L    Alk.  phosphatase 98 45 - 117 U/L    Protein, total 8.6 (H) 6.4 - 8.2 g/dL    Albumin 3.4 (L) 3.5 - 5.0 g/dL    Globulin 5.2 (H) 2.0 - 4.0 g/dL    A-G Ratio 0.7 (L) 1.1 - 2.2

## 2019-12-27 ENCOUNTER — TELEPHONE (OUTPATIENT)
Dept: GYNECOLOGY | Age: 59
End: 2019-12-27

## 2019-12-27 NOTE — TELEPHONE ENCOUNTER
Abnormal labs reviewed with pt and Matthew TENA. Pt states she is not having any sob, weakness, or feeling tired. She has an appt next week for mnthly fu and this will be addressed on that visit.

## 2019-12-31 ENCOUNTER — OFFICE VISIT (OUTPATIENT)
Dept: GYNECOLOGY | Age: 59
End: 2019-12-31

## 2019-12-31 VITALS
DIASTOLIC BLOOD PRESSURE: 83 MMHG | WEIGHT: 278.6 LBS | HEIGHT: 69 IN | SYSTOLIC BLOOD PRESSURE: 150 MMHG | HEART RATE: 75 BPM | BODY MASS INDEX: 41.26 KG/M2

## 2019-12-31 DIAGNOSIS — C56.9 MALIGNANT NEOPLASM OF OVARY, UNSPECIFIED LATERALITY (HCC): Primary | ICD-10-CM

## 2019-12-31 DIAGNOSIS — Z79.899 ON ANTINEOPLASTIC CHEMOTHERAPY: ICD-10-CM

## 2019-12-31 DIAGNOSIS — G89.3 CANCER ASSOCIATED PAIN: ICD-10-CM

## 2019-12-31 DIAGNOSIS — N13.1 HYDRONEPHROSIS DUE TO OBSTRUCTION OF URETER: ICD-10-CM

## 2019-12-31 PROBLEM — R50.9 FEVER: Status: RESOLVED | Noted: 2018-11-27 | Resolved: 2019-12-31

## 2019-12-31 PROBLEM — E87.5 ACUTE HYPERKALEMIA: Status: RESOLVED | Noted: 2018-11-27 | Resolved: 2019-12-31

## 2019-12-31 PROBLEM — J45.901 MODERATE ASTHMA WITH ACUTE EXACERBATION: Status: RESOLVED | Noted: 2018-11-27 | Resolved: 2019-12-31

## 2019-12-31 PROBLEM — R07.9 ACUTE CHEST PAIN: Status: RESOLVED | Noted: 2018-11-27 | Resolved: 2019-12-31

## 2019-12-31 PROBLEM — K03.81 CRACKED TOOTH: Status: RESOLVED | Noted: 2018-07-31 | Resolved: 2019-12-31

## 2019-12-31 PROBLEM — R06.02 SOB (SHORTNESS OF BREATH): Status: RESOLVED | Noted: 2018-07-17 | Resolved: 2019-12-31

## 2019-12-31 PROBLEM — E86.0 DEHYDRATION: Status: RESOLVED | Noted: 2018-11-27 | Resolved: 2019-12-31

## 2019-12-31 PROBLEM — I89.0 LYMPHEDEMA OF BOTH LOWER EXTREMITIES: Status: ACTIVE | Noted: 2019-12-31

## 2019-12-31 NOTE — PATIENT INSTRUCTIONS
Dear Chuy Walker,     It was a pleasure seeing you today. We discussed your history and present course as well as plan. Your exam today was normal.        Here is our treatment plan:  · Return to clinic in 1 month  · Labs next week. 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 If you were not given an appointment or have not made one, please call and schedule at your convenience. If you have any questions, please call or email and we will follow up with you as soon as possible. Please understand it may take some time to get back for non-urgent issues. Your patience is appreciated. Do not forget to sign-up for [employer] for access to your patient record, results and to contact us directly through your chart. See attached form for your registration code.       Tom [place], [place]  [id number] [facility], [address] [place] [address]

## 2019-12-31 NOTE — PROGRESS NOTES
[address] [facility] [address], [address] [facility]  P [phone]  F [phone]    Office Visit    Patient ID:  Name: Criselda Arango  MRN: [id number]  : 1960/59 y.o. Visit date: 2019    LEONID Villalpando is a 63 y.o.  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 The patient's previous treatment procedures include primary Taxol/carbo with retreatment in  and Doxil/Avastin, topotecan, gemzar and single agent weekly Taxol initiated 18 following progression with chest wall involvement. In 2018, patient did not follow up and self-discontinued her treatment regimen. She presented to the ER on 18 with SOB. Negative workup for PE or MI. Admitted to Hospitalist service from -18. Managed for acute asthma exacerbation, acute FELICIA, and hyperkalemia. The patient was initiated on Sruthi Buttery on 19. Underwent repeat CT C/A/P on 19 for purposes of following response to treatment. Patient was tolerating therapy well for the first month, but then had a rise in her creatinine. However, she was also found to have hydronephrosis. Underwent right ureteral stent placement on 3/18/19. Restarted Zejula afterwards, although with continued rise in creatinine to >2. Held therapy again and restarted at 100mg/daily on 19. Completed 2700 cGy of radiation to her chest wall mass on 19. Had ureteral stent exchange on 10/31/19, due in one year. CT C/A/P on 10/2/19 with improvement in her disease and  generally declining. Presents today for follow-up and consideration of continuation of Sruthi Buttery. Tolerating treatment well. Held for one week in 10/2019 for blood transfusion secondary to anemia. Normal iron and B12 labs. The pain at her chest wall mass is much improved. Otherwise the patient is really without complaints.   Denies change in appetite or bowel habits. Denies vaginal bleeding, hematuria, hematochezia, constipation, diarrhea, nausea, vomiting, CP, SOB, fevers, or chills. No dysuria, completed abx course last month. She is most bothered by right knee pain, looking to have right new replacement. Her cardiologist would like to do a workup prior to this. Possible surgery in late Jan vs Feb.  Advised we can coordinate. From a social and QoL standpoint, she is doing excellent and remains very happy. She is exercising walking on treadmill, stationary bike, band strength. She has lost 100lbs in the last year through diet/exercise. She is studying to become a . 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         OncTx History:  9/21/12: MARAH/BSO/Cytoreductive surgery on 9/21/12 noted carcinomatosis/omental caking. 10/2012-2/2013: 6 cycles Carbo/Taxol  8/2014-11/2014: 6 Cycles carbo/Taxol  3/2015-5/13/15: 3 cycles Avastin/Doxil until progression  7/2015-8/2015 Weekly Topotecan   12/2015-3/2016: Weekly Topotecan  2/2017-9/2017:  Gemzar D1/D8 Q28 days for 6 cycles  4/2018: CT core needle bx chest wall mass: Metastatic high-grade serous carcinoma (see comment)   General Categorization   Positive for malignancy. 5/8/2018-Present: Taxol  Y4,7,65; S/P  Cycle #4  8/14/2018. Patient discontinued treatment secondary to side effects and fatigue. 1/23/2019 - Initiated Moy Pressley. Held do to rising creatinine, found to have hydronephrosis  3/18/19 - right ureteral stent placed d/t hydro  7/11/19 - Completed 2700 cGy to the sternum      Imaging Review:   CT C/A/P 10/2/19:   FINDINGS:     THYROID: Visualized gland is unremarkable. MEDIASTINUM: Upper left paratracheal node measures 2.2 x 3.4 cm (2, 8),  previously 2.3 x 3.5 cm. Superior prevascular node measures 2.9 x 3.0 cm,  previously 3.5 x 3.8 cm. Poorly delineated subcarinal node measures about 3.4 x  4.8 cm, previously 3.6 x 4.8 cm.  Precardiac nodes measure 1.9 x 2.5 cm and 1.7 x  2.4 cm (2, 39), previously 2.1 x 2.7 cm and 2.0 x 2.0 cm. SWATI: No mass or lymphadenopathy. THORACIC AORTA: No dissection or aneurysm. MAIN PULMONARY ARTERY: Normal in caliber. TRACHEA/BRONCHI: Patent. ESOPHAGUS: No wall thickening or dilatation. HEART: Normal in size. PLEURA: No effusion or pneumothorax. LUNGS: No nodule, mass, or airspace disease. LIVER: Multiple hypodense lesions suspicious for metastases, measuring up to 2.5  x 4.0 cm in segment 2 (2, 43) series, previously 2.8 x 3.7 cm, 3.2 x 4.6 cm in  segment 3 (2, 55), previously 3.1 x 4.5 cm, and partially exophytic from the  posterior inferior right lobe segment 6 measuring 2.5 x 3.5 cm, previously 2.8 x  3.4 cm. GALLBLADDER: Unremarkable. SPLEEN: Central 1.9 x 3.1 cm hypodensity (2, 47), previously 1.9 x 2.3 cm. Otherwise unremarkable. PANCREAS: Inseparable from bulky central abdominal adenopathy with no identified  ductal dilation. ADRENALS: Unremarkable. KIDNEYS: Right hydronephrosis has improved status post right nephroureteral  stent placement. Kidneys otherwise appear unremarkable. STOMACH: Unremarkable. SMALL BOWEL: No dilatation or wall thickening. COLON: No dilation or wall thickening. APPENDIX: Unremarkable. PERITONEUM: Extensive peritoneal implants with representative largest lesions  measuring 4.4 x 6.1 cm and the left adnexal region (2, 98), previously 4.3 x 5.7  cm, 3.8 x 5.9 cm in the anterior midline (2, 81), previously 3.4 x 5.4 cm, and  2.8 x 4.7 cm in the right abdomen (2, 78), previously 3.2 x 4.7 cm. RETROPERITONEUM: Extensive rocio hepatis, retroperitoneal, and pelvic sidewall  adenopathy. Conglomerate nkechi mass in the rocio hepatis region measures 5.7 x  11.1 cm (2, 58) is, previously 6.3 x 12.2 cm. Aortocaval node measures 3.7 x 5.3  cm (2, 77), previously 4.5 x 5.4 cm. Right pelvic sidewall node measures 2.9 x  4.2 cm (2, 103), previously 3.4 x 4.2 cm.  Left external iliac node measures 3.0  x 4.8 cm (2, 101), previously 3.3 x 4.8 cm. REPRODUCTIVE ORGANS: Uterus and ovaries are surgically absent. URINARY BLADDER: No mass or calculus. Distal end of right nephroureteral stent  positioned within bladder lumen. BONES: Degenerative spine change. No acute fracture or aggressive lesion. ADDITIONAL COMMENTS: Mass centered at the right sternocostal junction measures  5.3 x 5.4 cm (2, 21), previously 4.8 x 5.7 cm. Post surgical changes in the  anterior abdominal wall. Left Port-A-Cath in place. Bilateral inguinal  adenopathy measuring 2.1 x 3.2 cm on the right (2, 115), previously 2.3 x 3.7 cm  and 2.7 x 2.8 cm on the left (2, 108), previously 2.9 x 3.1 cm. .  IMPRESSION  IMPRESSION: Overall response to disease with either decrease in size or no  significant change in extensive nkechi metastases, hepatic metastases, right  sternal metastasis, and peritoneal carcinomatosis as above. No new metastasis or  progressive metastasis identified. Status post right nephroureteral stent with  interval resolution of right hydronephrosis. CT C/A/P 2/5/19:  FINDINGS:   THYROID: No nodule. MEDIASTINUM: Mediastinal lymphadenopathy is unchanged. A reference superior  mediastinal lymph node is stable measuring 3.3 cm. SWATI: No mass or lymphadenopathy. THORACIC AORTA: No dissection or aneurysm. MAIN PULMONARY ARTERY: Normal in caliber. TRACHEA/BRONCHI: Patent. ESOPHAGUS: No wall thickening or dilatation. HEART: Normal in size. PLEURA: No effusion or pneumothorax. LUNGS: No nodule, mass, or airspace disease. LIVER: Evaluation of the liver is limited by lack of intravenous contrast. There  is a vague 4.4 cm hypodense lesion in the left hepatic lobe previously measuring  3.0 cm. Lesion in the right hepatic lobe now measures 3.3 cm, previously  measuring 2.7 cm. GALLBLADDER: Unremarkable. SPLEEN: Central soft tissue abnormality has increased in size, now measuring 4.9  cm, previously measuring 3.1 cm.   PANCREAS: Not well evaluated given the massive lymphadenopathy and lack of  intravenous contrast.  ADRENALS: Unremarkable. KIDNEYS: There is no moderate right hydronephrosis. STOMACH: Unremarkable. SMALL BOWEL: No dilatation or wall thickening. COLON: No dilatation or wall thickening. APPENDIX: Not visualized. PERITONEUM: Bulky gastrohepatic, periceliac, portacaval, and mesenteric  lymphadenopathy has increased. There is secondary invasion of the liver. Maximum  dimension in the upper abdomen is currently 10.4 cm, previously 9.3 cm. Elemental soft tissue nodules have increased in size, with a reference 7.0 cm  nodule in the left abdomen previously measuring 5.5 cm. Soft tissue nodule in  the deep pelvis between the bladder and colon has increased in size as well. RETROPERITONEUM: No lymphadenopathy or aortic aneurysm. REPRODUCTIVE ORGANS: The uterus and ovaries are surgically absent. URINARY BLADDER: No mass or calculus. BONES: Soft tissue mass involving the right sternum has not changed. Degenerative changes are seen in the thoracic and lumbar spine. ADDITIONAL COMMENTS: N/A  IMPRESSION  IMPRESSION:  Stable mediastinal lymphadenopathy. Progressive abdominal lymphadenopathy as  described above. Liver lesions have increased in size compared to the prior  exam. Stable right sternal mass. Moderate right hydroureteronephrosis is now  noted which appears to be related to the lymphadenopathy. ROS  Constitutional: no weight loss, fever, night sweats  Respiratory: no cough, shortness of breath, or wheezing  Cardiovascular: no chest pain or dyspnea on exertion. Reports chest wall mass smaller, no associated pain. Heme: No abnormal bleeding  Gastrointestinal: no abdominal pain, change in bowel habits, or black or bloody stools  Genito-Urinary: no dysuria, trouble voiding, or hematuria  Musculoskeletal: + swelling in ankle - bilateral, mostly only at end of day  Neurological: mild neuropathy  Derm: pigmentation/induration medial left leg. Prior injury from fall. Psych: denies depression, in remission. PMH:  Past Medical History:   Diagnosis Date    Adverse effect of anesthesia     sleep apnea cpap machine useage     Anemia     Arthritis     DDD low back and knees    Asthma     uses albuterol prn only; none x 6 mos.   Never hospitalized    BRCA negative 2013    Calculus of kidney     Chronic kidney disease     kidney stones    Chronic pain     knee pain bilat    CINV (chemotherapy-induced nausea and vomiting) 2014    Decreased hearing, right     PATIENT STATES THIS IS DUE TO CHEMOTHERAPY    Diabetes ([address] Age 46    IDDM    Environmental allergies     Fibromyalgia     GERD (gastroesophageal reflux disease)     controlled with med    Hx of pulmonary embolus during pregnancy 2015    Hydronephrosis due to obstruction of ureter 3/18/2019    Hypertension     Ill-defined condition     Sepsis  -  SOURCE PORT    Ill-defined condition 2016    chemotherapy     Mass of chest wall 2018    Morbid obesity (HCC)     Murmur, heart     Other and unspecified hyperlipidemia     Ovarian cancer ([address] 2012, 2014    serous, high grade, stage IIIc. s/p chemotx (has port)    Psychiatric disorder     Depression/Anxiety    Rectal bleeding     Thromboembolus ([address]     POST PARTUM; resolved- PELVIS    Thyroid disease     HYPOTHYROID    Unspecified sleep apnea     CPAP, Dr. Zachary Fuller     PSH:  Past Surgical History:   Procedure Laterality Date    BREAST SURGERY PROCEDURE UNLISTED      Lymph node in left breast 2017   Neptune     X2    HX HYSTERECTOMY  2012    ROULA/BSO, tumor debulking, omentectomy for ovarian cancer    HX ORTHOPAEDIC Right     ankle-FX, R    HX ORTHOPAEDIC Right     FX R ARM    HX UROLOGICAL Right     STENT FOR KIDNEY STONES    HX VASCULAR ACCESS  2015    right chest- port placed    HX VASCULAR ACCESS Left 2017    TETE CATH     SOC:  Social History Socioeconomic History    Marital status:      Spouse name: Not on file    Number of children: Not on file    Years of education: Not on file    Highest education level: Not on file   Occupational History    Not on file   Social Needs    Financial resource strain: Not on file    Food insecurity:     Worry: Not on file     Inability: Not on file    Transportation needs:     Medical: Not on file     Non-medical: Not on file   Tobacco Use    Smoking status: Never Smoker    Smokeless tobacco: Never Used   Substance and Sexual Activity    Alcohol use: Yes     Comment: 1 drink per month    Drug use: No    Sexual activity: Yes   Lifestyle    Physical activity:     Days per week: Not on file     Minutes per session: Not on file    Stress: Not on file   Relationships    Social connections:     Talks on phone: Not on file     Gets together: Not on file     Attends Mandaeism service: Not on file     Active member of club or organization: Not on file     Attends meetings of clubs or organizations: Not on file     Relationship status: Not on file    Intimate partner violence:     Fear of current or ex partner: Not on file     Emotionally abused: Not on file     Physically abused: Not on file     Forced sexual activity: Not on file   Other Topics Concern    Not on file   Social History Narrative    Lives in [facility] alone.  passed away in 5/16 of a heart attack. Has 2 sons. Disability. 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 Enjoys swimming and going to the gym.      Family History  Family History   Problem Relation Age of Onset    Hypertension Mother     Arthritis-osteo Mother     Hypertension Father     Arthritis-osteo Father     Cancer Father         PROSTATE    COPD Father     Hypertension Brother     Elevated Lipids Brother     Arthritis-osteo Brother     Hypertension Brother     Elevated Lipids Brother     Obesity Brother     Cancer Brother         PROSTATE  Anesth Problems Son         DELAYED AWAKENING    Diabetes Maternal Grandmother     Anesth Problems Paternal Grandmother         PATIENT STATES [address] STOPPED DURING SURGERY     Medications: (reviewed)  Current Outpatient Medications on File Prior to Visit   Medication Sig Dispense Refill    cycloSPORINE (RESTASIS) 0.05 % dpet       albuterol (PROVENTIL VENTOLIN) 2.5 mg /3 mL (0.083 %) nebu Take 2.5 mg by inhalation.  ciprofloxacin HCl (CIPRO) 500 mg tablet Take 1 Tab by mouth two (2) times a day. 14 Tab 0    dextroamphetamine-amphetamine (ADDERALL) 20 mg tablet TAKE 1 TABLET BY MOUTH EVERY DAY  0    rosuvastatin (CRESTOR) 10 mg tablet TAKE 1 TABLET BY MOUTH EVERY DAY  0    fluticasone propionate (FLONASE) 50 mcg/actuation nasal spray 2 SPRAYS IN BOTH NOSTRILS DAILY 1 Bottle 2    telmisartan (MICARDIS) 20 mg tablet TAKE 1 TABLET BY MOUTH EVERY DAY 90 Tab 3    atorvastatin (LIPITOR) 20 mg tablet       BD ULTRA-FINE SHORT PEN NEEDLE 31 gauge x 5/16\" ndle USE TO INJECT INSULIN 5 TIMES DAILY 200 Pen Needle 11    benzonatate (TESSALON) 200 mg capsule Take 1 Cap by mouth three (3) times daily as needed for Cough. 30 Cap 0    insulin aspart U-100 (NOVOLOG FLEXPEN U-100 INSULIN) 100 unit/mL (3 mL) inpn INJECT 20 UNITS BEFORE EACH MEAL + 4 UNITS FOR EVERY 50 MG/DL ABOVE 150 MG/DL.  UNITS PER DAY 90 Adjustable Dose Pre-filled Pen Syringe 3    LEVEMIR FLEXTOUCH U-100 INSULN 100 unit/mL (3 mL) inpn INJECT 30 UNITS IN AM AND 50 UNITS AT NIGHT. INCREASE AS DIRECTED TO  UNITS/DAY. 90 Adjustable Dose Pre-filled Pen Syringe 3    VICTOZA 3-ERNESTO 0.6 mg/0.1 mL (18 mg/3 mL) pnij INJECT 1.8 MG BY SUBCUTANEOUS ROUTE DAILY (WITH LUNCH).  27 Adjustable Dose Pre-filled Pen Syringe 3    DEXILANT 60 mg CpDB capsule (delayed release) TAKE 1 CAPSULE BY MOUTH EVERY DAY 90 Cap 3    omega-3 acid ethyl esters (LOVAZA) 1 gram capsule TAKE 1 CAPSULE BY MOUTH TWICE A  Cap 2    LINZESS 145 mcg cap capsule TAKE 1 CAPSULE BY MOUTH EVERY DAYY 30 Cap 0    ondansetron (ZOFRAN ODT) 4 mg disintegrating tablet Take 1 Tab by mouth every eight (8) hours as needed for Nausea. Indications: Nausea and Vomiting caused by Cancer Drugs 30 Tab 2    levothyroxine (SYNTHROID) 150 mcg tablet TAKE 1 TABLET BY MOUTH EVERY DAY BEFORE BREAKFAST 90 Tab 0    EPINEPHrine (EPIPEN) 0.3 mg/0.3 mL injection INJECT 0.3 ML BY INTRAMUSCULAR ROUTE ONCE AS NEEDED FOR UP TO 1 DOSE.  1    ZEJULA 100 mg cap Take 100 mg by mouth two (2) times a day.  lancets misc by Does Not Apply route daily. True Metrix lancets- test blood sugar once per day 30 Each 5    Blood-Glucose Meter monitoring kit Check up to tid, as directed 1 Kit 0    azelastine (ASTELIN) 137 mcg (0.1 %) nasal spray 1 [place] by Both Nostrils route daily as needed. Use in each nostril as directed       insulin detemir U-100 (LEVEMIR) 100 unit/mL injection 20 Units by SubCUTAneous route daily (with lunch).  montelukast (SINGULAIR) 10 mg tablet Take 10 mg by mouth nightly.  ascorbic acid, vitamin C, (VITAMIN C) 1,000 mg tablet Take 1,000 mg by mouth daily.  albuterol (PROVENTIL VENTOLIN) 2.5 mg /3 mL (0.083 %) nebulizer solution 2.5 mg by Nebulization route every four (4) hours as needed for Wheezing.  potassium chloride (KLOR-CON M10) 10 mEq tablet TAKE 1 TAB BY MOUTH TWO (2) DAYS A WEEK. WED AND FRI 90 Tab 1    Liraglutide (VICTOZA) 0.6 mg/0.1 mL (18 mg/3 mL) pnij 1.8 mg by SubCUTAneous route daily (with lunch). 3 Pen 3    SYMBICORT 160-4.5 mcg/actuation HFAA Take 2 Puffs by inhalation two (2) times daily as needed. 2 Inhaler 3    clonazePAM (KLONOPIN) 0.5 mg tablet Take 0.5 mg by mouth nightly as needed.  sertraline (ZOLOFT) 50 mg tablet TAKE 1 TABLET BY MOUTH as needed  1    cholecalciferol, VITAMIN D3, (VITAMIN D3) 5,000 unit tab tablet Take 5,000 Units by mouth daily.  furosemide (LASIX) 40 mg tablet Take 40 mg by mouth every other day.   3  oxyCODONE IR (ROXICODONE) 5 mg immediate release tablet Take 1 Tab by mouth every four (4) hours as needed for Pain for up to 30 days. Max Daily Amount: 30 mg. 45 Tab 0    amoxicillin (AMOXIL) 500 mg capsule Prior to dental procedures      glucose blood VI test strips (TRUE METRIX GLUCOSE TEST STRIP) strip by Does Not Apply route See Admin Instructions. 30 Strip 5     No current facility-administered medications on file prior to visit. Allergies: (reviewed)  Allergies   Allergen Reactions    Carboplatin Other (comments)     Patient developed shortness of breath, felt faint, coughing, skin flushed and \"itchy\". This occurred on the patients 8th treatment.  Iodinated Contrast Media Rash     13 hr pre-medication prior to IV contrast.   Patient has done well with 1 hr pre-medication of (Solu-Medrol) 40mg &  (Benadryl) 50mg.  Lipitor [Atorvastatin] Myalgia    Shellfish Containing Products Hives    Tape [Adhesive] Itching and Other (comments)     \"op site-- clear,thin tape\"--caused blister     Tomato Hives    Olmesartan Other (comments)    Losartan Other (comments)     headaches    Percocet [Oxycodone-Acetaminophen] Other (comments)     Fever; however, current home med       OBJECTIVE  Physical Exam  Visit Vitals  /83 (BP 1 Location: Left arm, BP Patient Position: Sitting)   Pulse 75   Ht 5' 9\" (1.753 m)   Wt 278 lb 9.6 oz (126.4 kg)   LMP 09/07/2011   BMI 41.14 kg/m²     Physical Exam:  General: Alert and oriented. No acute distress. Well-nourished  HEENT: No thyroid enlargment. Neck supple without restrictions. Sclera normal. Normal occular motion. Moist mucous membranes. Lymphatics: No evidence of axillary, cervical, or subclavicular adenopathy. Respiratory: clear to auscultation and percussion to the bases. No CVAT. Chest wall mass is still palpable along the superior sternum at the level of the superior aspect of the breasts, but improved.  Normal overlying skin  Cardiovascular: regular rate and rhythm. No murmurs, rubs, or gallops. Gastrointestinal: soft, non-tender, non-distended, no masses or organomegaly. Well-healed incision. Musculoskeletal: normal gait. No joint tenderness, deformity or swelling. No muscular tenderness. Extremities: extremities normal, atraumatic, mild 1+ edema bilaterally. Pelvic: deferred today  Neuro: Grossly intact. Normal gait and movement. No acute deficit  Skin: No evidence of rashes or skin changes. DATE REVIEW as available:  Lab Results   Component Value Date/Time    WBC 4.8 12/26/2019 04:06 PM    Hemoglobin (POC) 10.5 (L) 03/20/2017 12:15 PM    HGB 7.7 (L) 12/26/2019 04:06 PM    Hematocrit (POC) 31 (L) 03/20/2017 12:15 PM    HCT 25.9 (L) 12/26/2019 04:06 PM    PLATELET 889 (L) 21/79/6555 04:06 PM    MCV 98.9 12/26/2019 04:06 PM     Lab Results   Component Value Date/Time    ABS. NEUTROPHILS 2.8 12/26/2019 04:06 PM     Lab Results   Component Value Date/Time    Sodium 139 12/26/2019 04:06 PM    Potassium 4.6 12/26/2019 04:06 PM    Chloride 114 (H) 12/26/2019 04:06 PM    CO2 22 12/26/2019 04:06 PM    Anion gap 3 (L) 12/26/2019 04:06 PM    Glucose 113 (H) 12/26/2019 04:06 PM    BUN 36 (H) 12/26/2019 04:06 PM    Creatinine 1.89 (H) 12/26/2019 04:06 PM    BUN/Creatinine ratio 19 12/26/2019 04:06 PM    GFR est AA 33 (L) 12/26/2019 04:06 PM    GFR est non-AA 27 (L) 12/26/2019 04:06 PM    Calcium 9.0 12/26/2019 04:06 PM    Bilirubin, total 0.3 12/26/2019 04:06 PM    AST (SGOT) 44 (H) 12/26/2019 04:06 PM    Alk. phosphatase 98 12/26/2019 04:06 PM    Protein, total 8.6 (H) 12/26/2019 04:06 PM    Albumin 3.4 (L) 12/26/2019 04:06 PM    Globulin 5.2 (H) 12/26/2019 04:06 PM    A-G Ratio 0.7 (L) 12/26/2019 04:06 PM    ALT (SGPT) 30 12/26/2019 04:06 PM       CA-125   Date Value Ref Range Status   11/27/2019 191 (H) 1.5 - 35.0 U/mL Final     Comment:     ** Note new reference range and method **  Results may vary depending on .   Method used is [employer]     10/18/2019 124 (H) 1.5 - 35.0 U/mL Final     Comment:     ** Note new reference range and method **  Results may vary depending on . Method used is GeoVS     10/03/2019 129 (H) 1.5 - 35.0 U/mL Final     Comment:     ** Note new reference range and method **  Results may vary depending on . Method used is GeoVS     09/05/2019 185 (H) 1.5 - 35.0 U/mL Final     Comment:     ** Note new reference range and method **  Results may vary depending on . Method used is GeoVS         ECHO 7/17/18  Left ventricle: Systolic function was normal. Ejection fraction was estimated in the range of 55 % to 60 %. There were no regional wall motion  abnormalities. Wall thickness was mildly increased. Left atrium: The atrium was mildly dilated. Mitral valve: There was mild regurgitation. Aortic valve: Leaflets exhibited sclerosis without stenosis. Not well visualized. IMPRESSION AND PLAN:  Ms. Jaspreet Quinn is a 63 y.o. female with recurrent, metastatic ovarian cancer. Heavily pre-treated. Lost to follow-up since 9/2018, at which time patient self-discontinued weekly paclitaxel. Initiated Donovan Radha on 1/23/19. Held for various events including rise in creatinine with hydro, now s/p right ureteral stent. S/p sternal RT completed 2700 cGy on 7/11/19. Restarted Zejula at 100mg/daily on 4/23/19, tolerating therapy overall with relatively asymptomatic state and response with positive QoL.    ECOG 1    Patient Active Problem List   Diagnosis Code    Carcinomatosis ([address] C80.0    Controlled type 2 diabetes mellitus without complication, with long-term current use of insulin (HCC) E11.9, Z79.4    Morbid obesity ([address] E66.01    Abnormal mammogram R92.8    SHABANA on CPAP G47.33, Z99.89    Dyslipidemia (high LDL; low HDL) E78.5    Benign essential hypertension I10    Port-A-Cath in place Z95.828    Ovarian cancer ([address] C56.9  Hyperglycemia due to type 2 diabetes mellitus ([facility] [address] E11.65    Renal calculi N20.0    Pulmonary hypertension, mild (HCC) I27.20    Lymphadenopathy, mediastinal R59.0    Type 2 diabetes mellitus with nephropathy (HCC) E11.21    On antineoplastic chemotherapy Z79.899    Reactive depression F32.9    Contrast media allergy Z91.041    Diabetes (HCC) E11.9    Hypothyroidism E03.9    Anemia D64.9    Hypertension I10    Pain due to malignant neoplasm metastatic to bone (HCC) G89.3, C79.51    Mass of chest wall R22.2    Thrombocytopenia (HCC) D69.6    S/P ureteral stent placement Z96.0    Anemia associated with chemotherapy D64.81, T45.1X5A    Lymphedema of both lower extremities I89.0       Reviewed patient's course to date. Tolerating Zejula 100mg/daily. Creatinine stable. Ca-125 trending down/stable. CT C/A/P on 10/2/19 with improvement and stability of disease burden. Stable Hgb after transfusion on 10/21/19. Normal iron and B12 studies. Restarted Zejula 11/2019. Doing well. F/u lab panel one week eval heme and renal functions. Therapy considerations ongoing. F/u one month office  Cardiology eval pending  Anticipate knee replacement in coming months, we can coordinate her chemotherapy      All questions and concerns were addressed with the patient and she is comfortable with the plan.       MALLIKA Oscar

## 2019-12-31 NOTE — PROGRESS NOTES
Monthly follow up for John Becerril, labs were drawn on 12/26/19, pt states she saw cardiologist last week and he has concerns that she is having issues due to the John Becerril, he has ordered several tests including stress test that she will have on 1/16/20 and 1/17/20, she will also see her primary care doctor on 1/13/20 for her blood sugars,     Initial blood pressure reading 151/80, repeat blood pressure reading 150/83. 1. Have you been to the ER, urgent care clinic since your last visit? Hospitalized since your last visit?  no    2. Have you seen or consulted any other health care providers outside of the [address] [place] [address] since your last visit? Include any pap smears or colon screening.   no

## 2020-01-01 ENCOUNTER — APPOINTMENT (OUTPATIENT)
Dept: INFUSION THERAPY | Age: 60
End: 2020-01-01
Payer: MEDICARE

## 2020-01-01 ENCOUNTER — OFFICE VISIT (OUTPATIENT)
Dept: GYNECOLOGY | Age: 60
End: 2020-01-01
Payer: MEDICARE

## 2020-01-01 ENCOUNTER — HOSPITAL ENCOUNTER (OUTPATIENT)
Dept: INFUSION THERAPY | Age: 60
Discharge: HOME | End: 2020-06-11
Payer: MEDICARE

## 2020-01-01 ENCOUNTER — TRANSCRIBE ORDER (OUTPATIENT)
Dept: REGISTRATION | Age: 60
End: 2020-01-01

## 2020-01-01 ENCOUNTER — VIRTUAL VISIT (OUTPATIENT)
Dept: GYNECOLOGY | Age: 60
End: 2020-01-01

## 2020-01-01 ENCOUNTER — VIRTUAL VISIT (OUTPATIENT)
Dept: GYNECOLOGY | Age: 60
End: 2020-01-01
Payer: MEDICARE

## 2020-01-01 ENCOUNTER — HOSPITAL ENCOUNTER (OUTPATIENT)
Dept: INFUSION THERAPY | Age: 60
Discharge: HOME | End: 2020-05-14
Payer: MEDICARE

## 2020-01-01 ENCOUNTER — TELEPHONE (OUTPATIENT)
Dept: GYNECOLOGY | Age: 60
End: 2020-01-01

## 2020-01-01 ENCOUNTER — HOSPITAL ENCOUNTER (OUTPATIENT)
Dept: INFUSION THERAPY | Age: 60
Discharge: HOME | End: 2020-12-10
Payer: MEDICARE

## 2020-01-01 ENCOUNTER — APPOINTMENT (OUTPATIENT)
Dept: CT IMAGING | Age: 60
End: 2020-01-01
Attending: OBSTETRICS & GYNECOLOGY
Payer: MEDICARE

## 2020-01-01 ENCOUNTER — HOSPITAL ENCOUNTER (OUTPATIENT)
Dept: GENERAL RADIOLOGY | Age: 60
Discharge: HOME | End: 2020-12-04
Payer: MEDICARE

## 2020-01-01 ENCOUNTER — HOSPITAL ENCOUNTER (OUTPATIENT)
Dept: INFUSION THERAPY | Age: 60
Discharge: HOME | End: 2020-10-22
Payer: MEDICARE

## 2020-01-01 ENCOUNTER — HOSPITAL ENCOUNTER (OUTPATIENT)
Dept: INFUSION THERAPY | Age: 60
End: 2020-01-01

## 2020-01-01 ENCOUNTER — HOSPITAL ENCOUNTER (OUTPATIENT)
Dept: INFUSION THERAPY | Age: 60
Discharge: HOME | End: 2020-08-06
Payer: MEDICARE

## 2020-01-01 ENCOUNTER — HOSPITAL ENCOUNTER (OUTPATIENT)
Dept: INFUSION THERAPY | Age: 60
Discharge: HOME | End: 2020-11-05
Payer: MEDICARE

## 2020-01-01 ENCOUNTER — HOSPITAL ENCOUNTER (OUTPATIENT)
Dept: INFUSION THERAPY | Age: 60
Discharge: HOME | End: 2020-11-19
Payer: MEDICARE

## 2020-01-01 ENCOUNTER — HOSPITAL ENCOUNTER (OUTPATIENT)
Dept: INFUSION THERAPY | Age: 60
Discharge: HOME | End: 2020-08-13
Payer: MEDICARE

## 2020-01-01 ENCOUNTER — HOSPITAL ENCOUNTER (OUTPATIENT)
Dept: INFUSION THERAPY | Age: 60
Discharge: HOME | End: 2020-09-17
Payer: MEDICARE

## 2020-01-01 ENCOUNTER — HOSPITAL ENCOUNTER (OUTPATIENT)
Dept: INFUSION THERAPY | Age: 60
Discharge: HOME | End: 2020-12-03
Payer: MEDICARE

## 2020-01-01 ENCOUNTER — OFFICE VISIT (OUTPATIENT)
Dept: URGENT CARE | Age: 60
End: 2020-01-01
Payer: MEDICARE

## 2020-01-01 ENCOUNTER — HOSPITAL ENCOUNTER (OUTPATIENT)
Dept: CT IMAGING | Age: 60
Discharge: HOME | End: 2020-06-24
Attending: OBSTETRICS & GYNECOLOGY
Payer: MEDICARE

## 2020-01-01 ENCOUNTER — HOSPITAL ENCOUNTER (OUTPATIENT)
Dept: INFUSION THERAPY | Age: 60
Discharge: HOME | End: 2020-11-13
Payer: MEDICARE

## 2020-01-01 ENCOUNTER — HOSPITAL ENCOUNTER (OUTPATIENT)
Dept: INFUSION THERAPY | Age: 60
Discharge: HOME | End: 2020-10-15
Payer: MEDICARE

## 2020-01-01 ENCOUNTER — DOCUMENTATION ONLY (OUTPATIENT)
Dept: ONCOLOGY | Age: 60
End: 2020-01-01

## 2020-01-01 ENCOUNTER — HOSPITAL ENCOUNTER (OUTPATIENT)
Dept: INFUSION THERAPY | Age: 60
Discharge: HOME | End: 2020-12-29
Payer: MEDICARE

## 2020-01-01 ENCOUNTER — HOSPITAL ENCOUNTER (OUTPATIENT)
Dept: INFUSION THERAPY | Age: 60
Discharge: HOME | End: 2020-09-03
Payer: MEDICARE

## 2020-01-01 ENCOUNTER — HOSPITAL ENCOUNTER (OUTPATIENT)
Dept: INFUSION THERAPY | Age: 60
Discharge: HOME | End: 2020-08-20
Payer: MEDICARE

## 2020-01-01 ENCOUNTER — HOSPITAL ENCOUNTER (OUTPATIENT)
Dept: INFUSION THERAPY | Age: 60
Discharge: HOME | End: 2020-12-17
Payer: MEDICARE

## 2020-01-01 ENCOUNTER — HOSPITAL ENCOUNTER (OUTPATIENT)
Dept: INFUSION THERAPY | Age: 60
Discharge: HOME | End: 2020-09-10
Payer: MEDICARE

## 2020-01-01 ENCOUNTER — HOSPITAL ENCOUNTER (OUTPATIENT)
Dept: INFUSION THERAPY | Age: 60
Discharge: HOME | End: 2020-11-12
Payer: MEDICARE

## 2020-01-01 ENCOUNTER — HOSPITAL ENCOUNTER (OUTPATIENT)
Dept: ULTRASOUND IMAGING | Age: 60
Discharge: HOME | End: 2020-10-29
Attending: FAMILY MEDICINE
Payer: MEDICARE

## 2020-01-01 ENCOUNTER — HOSPITAL ENCOUNTER (OUTPATIENT)
Dept: INFUSION THERAPY | Age: 60
Discharge: HOME | End: 2020-12-30
Payer: MEDICARE

## 2020-01-01 ENCOUNTER — TRANSCRIBE ORDER (OUTPATIENT)
Dept: SCHEDULING | Age: 60
End: 2020-01-01

## 2020-01-01 ENCOUNTER — HOSPITAL ENCOUNTER (OUTPATIENT)
Dept: INFUSION THERAPY | Age: 60
Discharge: HOME | End: 2020-10-08
Payer: MEDICARE

## 2020-01-01 VITALS
DIASTOLIC BLOOD PRESSURE: 78 MMHG | SYSTOLIC BLOOD PRESSURE: 133 MMHG | HEART RATE: 70 BPM | RESPIRATION RATE: 16 BRPM | TEMPERATURE: 97.7 F

## 2020-01-01 VITALS
WEIGHT: 276 LBS | BODY MASS INDEX: 41.83 KG/M2 | SYSTOLIC BLOOD PRESSURE: 140 MMHG | HEART RATE: 77 BPM | TEMPERATURE: 97.1 F | DIASTOLIC BLOOD PRESSURE: 90 MMHG | RESPIRATION RATE: 18 BRPM | HEIGHT: 68 IN

## 2020-01-01 VITALS
HEART RATE: 82 BPM | SYSTOLIC BLOOD PRESSURE: 150 MMHG | TEMPERATURE: 96.9 F | BODY MASS INDEX: 38.1 KG/M2 | RESPIRATION RATE: 18 BRPM | WEIGHT: 251.4 LBS | DIASTOLIC BLOOD PRESSURE: 77 MMHG | HEIGHT: 68 IN

## 2020-01-01 VITALS
DIASTOLIC BLOOD PRESSURE: 86 MMHG | RESPIRATION RATE: 18 BRPM | BODY MASS INDEX: 37.04 KG/M2 | HEART RATE: 80 BPM | HEIGHT: 68 IN | SYSTOLIC BLOOD PRESSURE: 161 MMHG | WEIGHT: 244.4 LBS | TEMPERATURE: 97 F

## 2020-01-01 VITALS
DIASTOLIC BLOOD PRESSURE: 83 MMHG | TEMPERATURE: 97.4 F | HEART RATE: 67 BPM | SYSTOLIC BLOOD PRESSURE: 144 MMHG | WEIGHT: 268.2 LBS | SYSTOLIC BLOOD PRESSURE: 144 MMHG | TEMPERATURE: 97.9 F | WEIGHT: 251.32 LBS | HEART RATE: 70 BPM | DIASTOLIC BLOOD PRESSURE: 80 MMHG | BODY MASS INDEX: 40.65 KG/M2 | HEIGHT: 68 IN | HEIGHT: 68 IN | RESPIRATION RATE: 18 BRPM | BODY MASS INDEX: 38.09 KG/M2 | RESPIRATION RATE: 18 BRPM

## 2020-01-01 VITALS
DIASTOLIC BLOOD PRESSURE: 82 MMHG | HEART RATE: 86 BPM | SYSTOLIC BLOOD PRESSURE: 137 MMHG | BODY MASS INDEX: 39.8 KG/M2 | TEMPERATURE: 97.7 F | WEIGHT: 262.6 LBS | RESPIRATION RATE: 18 BRPM | HEIGHT: 68 IN

## 2020-01-01 VITALS
WEIGHT: 247 LBS | HEART RATE: 80 BPM | BODY MASS INDEX: 37.44 KG/M2 | HEIGHT: 68 IN | TEMPERATURE: 97.7 F | DIASTOLIC BLOOD PRESSURE: 77 MMHG | RESPIRATION RATE: 18 BRPM | SYSTOLIC BLOOD PRESSURE: 145 MMHG

## 2020-01-01 VITALS — TEMPERATURE: 96.6 F | SYSTOLIC BLOOD PRESSURE: 139 MMHG | DIASTOLIC BLOOD PRESSURE: 92 MMHG | HEART RATE: 78 BPM

## 2020-01-01 VITALS
SYSTOLIC BLOOD PRESSURE: 131 MMHG | DIASTOLIC BLOOD PRESSURE: 73 MMHG | WEIGHT: 258 LBS | HEIGHT: 68 IN | HEART RATE: 84 BPM | BODY MASS INDEX: 39.1 KG/M2

## 2020-01-01 VITALS
WEIGHT: 255 LBS | HEART RATE: 80 BPM | DIASTOLIC BLOOD PRESSURE: 85 MMHG | SYSTOLIC BLOOD PRESSURE: 141 MMHG | BODY MASS INDEX: 38.65 KG/M2 | RESPIRATION RATE: 18 BRPM | HEIGHT: 68 IN | TEMPERATURE: 97 F

## 2020-01-01 VITALS — OXYGEN SATURATION: 98 % | HEART RATE: 96 BPM | RESPIRATION RATE: 16 BRPM | TEMPERATURE: 98.3 F

## 2020-01-01 VITALS
HEART RATE: 91 BPM | DIASTOLIC BLOOD PRESSURE: 92 MMHG | WEIGHT: 264 LBS | SYSTOLIC BLOOD PRESSURE: 158 MMHG | HEIGHT: 68 IN | BODY MASS INDEX: 40.01 KG/M2

## 2020-01-01 VITALS
DIASTOLIC BLOOD PRESSURE: 85 MMHG | TEMPERATURE: 96.9 F | WEIGHT: 261 LBS | BODY MASS INDEX: 39.68 KG/M2 | HEART RATE: 86 BPM | RESPIRATION RATE: 18 BRPM | SYSTOLIC BLOOD PRESSURE: 146 MMHG

## 2020-01-01 VITALS
TEMPERATURE: 96.5 F | DIASTOLIC BLOOD PRESSURE: 79 MMHG | HEIGHT: 68 IN | WEIGHT: 273 LBS | HEART RATE: 84 BPM | SYSTOLIC BLOOD PRESSURE: 154 MMHG | BODY MASS INDEX: 41.37 KG/M2

## 2020-01-01 VITALS
SYSTOLIC BLOOD PRESSURE: 159 MMHG | HEART RATE: 72 BPM | TEMPERATURE: 97.8 F | DIASTOLIC BLOOD PRESSURE: 72 MMHG | RESPIRATION RATE: 16 BRPM

## 2020-01-01 VITALS
HEART RATE: 68 BPM | TEMPERATURE: 97.3 F | SYSTOLIC BLOOD PRESSURE: 146 MMHG | WEIGHT: 258.6 LBS | HEIGHT: 68 IN | DIASTOLIC BLOOD PRESSURE: 80 MMHG | RESPIRATION RATE: 18 BRPM | BODY MASS INDEX: 39.19 KG/M2

## 2020-01-01 VITALS
HEART RATE: 97 BPM | SYSTOLIC BLOOD PRESSURE: 129 MMHG | DIASTOLIC BLOOD PRESSURE: 76 MMHG | WEIGHT: 247.8 LBS | HEIGHT: 68 IN | BODY MASS INDEX: 37.56 KG/M2

## 2020-01-01 VITALS
RESPIRATION RATE: 18 BRPM | BODY MASS INDEX: 38.89 KG/M2 | WEIGHT: 256.6 LBS | SYSTOLIC BLOOD PRESSURE: 158 MMHG | DIASTOLIC BLOOD PRESSURE: 82 MMHG | HEART RATE: 81 BPM | HEIGHT: 68 IN | TEMPERATURE: 97.3 F

## 2020-01-01 VITALS
SYSTOLIC BLOOD PRESSURE: 158 MMHG | WEIGHT: 251.4 LBS | RESPIRATION RATE: 18 BRPM | HEIGHT: 68 IN | HEART RATE: 63 BPM | BODY MASS INDEX: 38.1 KG/M2 | DIASTOLIC BLOOD PRESSURE: 72 MMHG | TEMPERATURE: 97.7 F

## 2020-01-01 VITALS
TEMPERATURE: 97.5 F | RESPIRATION RATE: 18 BRPM | WEIGHT: 243 LBS | SYSTOLIC BLOOD PRESSURE: 165 MMHG | HEIGHT: 69 IN | BODY MASS INDEX: 35.99 KG/M2 | HEART RATE: 90 BPM | DIASTOLIC BLOOD PRESSURE: 88 MMHG

## 2020-01-01 VITALS
HEIGHT: 68 IN | WEIGHT: 258.4 LBS | BODY MASS INDEX: 39.16 KG/M2 | SYSTOLIC BLOOD PRESSURE: 132 MMHG | HEART RATE: 84 BPM | DIASTOLIC BLOOD PRESSURE: 83 MMHG

## 2020-01-01 VITALS
OXYGEN SATURATION: 98 % | TEMPERATURE: 98 F | HEART RATE: 82 BPM | SYSTOLIC BLOOD PRESSURE: 146 MMHG | RESPIRATION RATE: 16 BRPM | DIASTOLIC BLOOD PRESSURE: 84 MMHG

## 2020-01-01 VITALS
DIASTOLIC BLOOD PRESSURE: 69 MMHG | WEIGHT: 242.8 LBS | SYSTOLIC BLOOD PRESSURE: 128 MMHG | HEIGHT: 68 IN | HEART RATE: 72 BPM | RESPIRATION RATE: 18 BRPM | TEMPERATURE: 98.3 F | BODY MASS INDEX: 36.8 KG/M2

## 2020-01-01 VITALS
DIASTOLIC BLOOD PRESSURE: 82 MMHG | WEIGHT: 276 LBS | OXYGEN SATURATION: 98 % | TEMPERATURE: 97.3 F | SYSTOLIC BLOOD PRESSURE: 159 MMHG | HEART RATE: 70 BPM | RESPIRATION RATE: 18 BRPM | HEIGHT: 68 IN | BODY MASS INDEX: 41.83 KG/M2

## 2020-01-01 VITALS
TEMPERATURE: 98 F | WEIGHT: 270 LBS | BODY MASS INDEX: 40.92 KG/M2 | SYSTOLIC BLOOD PRESSURE: 142 MMHG | HEART RATE: 74 BPM | DIASTOLIC BLOOD PRESSURE: 79 MMHG | HEIGHT: 68 IN

## 2020-01-01 DIAGNOSIS — D64.9 ANEMIA, UNSPECIFIED TYPE: ICD-10-CM

## 2020-01-01 DIAGNOSIS — C80.0 CARCINOMATOSIS (HCC): Primary | ICD-10-CM

## 2020-01-01 DIAGNOSIS — G47.33 OSA ON CPAP: ICD-10-CM

## 2020-01-01 DIAGNOSIS — Z96.0 S/P URETERAL STENT PLACEMENT: ICD-10-CM

## 2020-01-01 DIAGNOSIS — I10 ESSENTIAL HYPERTENSION: ICD-10-CM

## 2020-01-01 DIAGNOSIS — Z79.4 CONTROLLED TYPE 2 DIABETES MELLITUS WITHOUT COMPLICATION, WITH LONG-TERM CURRENT USE OF INSULIN (HCC): ICD-10-CM

## 2020-01-01 DIAGNOSIS — R59.0 LYMPHADENOPATHY, MEDIASTINAL: ICD-10-CM

## 2020-01-01 DIAGNOSIS — C79.51 PAIN DUE TO MALIGNANT NEOPLASM METASTATIC TO BONE (HCC): ICD-10-CM

## 2020-01-01 DIAGNOSIS — I27.20 PULMONARY HYPERTENSION, MILD (HCC): ICD-10-CM

## 2020-01-01 DIAGNOSIS — R22.2 MASS OF CHEST WALL: ICD-10-CM

## 2020-01-01 DIAGNOSIS — R22.41 LOCALIZED SWELLING, MASS, OR LUMP OF RIGHT LOWER EXTREMITY: ICD-10-CM

## 2020-01-01 DIAGNOSIS — C56.9 MALIGNANT NEOPLASM OF OVARY, UNSPECIFIED LATERALITY (HCC): Primary | ICD-10-CM

## 2020-01-01 DIAGNOSIS — Z79.899 ON ANTINEOPLASTIC CHEMOTHERAPY: ICD-10-CM

## 2020-01-01 DIAGNOSIS — G89.3 PAIN DUE TO MALIGNANT NEOPLASM METASTATIC TO BONE (HCC): ICD-10-CM

## 2020-01-01 DIAGNOSIS — I89.0 LYMPHEDEMA OF BOTH LOWER EXTREMITIES: ICD-10-CM

## 2020-01-01 DIAGNOSIS — Z86.718 PERSONAL HISTORY OF VENOUS THROMBOSIS AND EMBOLISM: ICD-10-CM

## 2020-01-01 DIAGNOSIS — I10 HYPERTENSION, UNSPECIFIED TYPE: ICD-10-CM

## 2020-01-01 DIAGNOSIS — I82.4Y1 ACUTE DEEP VEIN THROMBOSIS (DVT) OF PROXIMAL VEIN OF RIGHT LOWER EXTREMITY (HCC): ICD-10-CM

## 2020-01-01 DIAGNOSIS — E11.9 CONTROLLED TYPE 2 DIABETES MELLITUS WITHOUT COMPLICATION, WITH LONG-TERM CURRENT USE OF INSULIN (HCC): ICD-10-CM

## 2020-01-01 DIAGNOSIS — E66.01 MORBID OBESITY (HCC): ICD-10-CM

## 2020-01-01 DIAGNOSIS — Z51.11 ENCOUNTER FOR ANTINEOPLASTIC CHEMOTHERAPY: ICD-10-CM

## 2020-01-01 DIAGNOSIS — E11.21 TYPE 2 DIABETES MELLITUS WITH NEPHROPATHY (HCC): ICD-10-CM

## 2020-01-01 DIAGNOSIS — C56.9 MALIGNANT NEOPLASM OF OVARY, UNSPECIFIED LATERALITY (HCC): ICD-10-CM

## 2020-01-01 DIAGNOSIS — T45.1X5A ANEMIA ASSOCIATED WITH CHEMOTHERAPY: ICD-10-CM

## 2020-01-01 DIAGNOSIS — E03.9 HYPOTHYROIDISM, UNSPECIFIED TYPE: ICD-10-CM

## 2020-01-01 DIAGNOSIS — C80.0 CARCINOMATOSIS (HCC): ICD-10-CM

## 2020-01-01 DIAGNOSIS — M79.604 RIGHT LEG PAIN: ICD-10-CM

## 2020-01-01 DIAGNOSIS — R11.2 CINV (CHEMOTHERAPY-INDUCED NAUSEA AND VOMITING): ICD-10-CM

## 2020-01-01 DIAGNOSIS — E11.65 TYPE 2 DIABETES MELLITUS WITH HYPERGLYCEMIA, WITH LONG-TERM CURRENT USE OF INSULIN (HCC): ICD-10-CM

## 2020-01-01 DIAGNOSIS — R31.0 GROSS HEMATURIA: ICD-10-CM

## 2020-01-01 DIAGNOSIS — R97.1 ELEVATED CA-125: ICD-10-CM

## 2020-01-01 DIAGNOSIS — Z99.89 OSA ON CPAP: ICD-10-CM

## 2020-01-01 DIAGNOSIS — Z92.3 HISTORY OF RADIATION THERAPY: ICD-10-CM

## 2020-01-01 DIAGNOSIS — C56.3 MALIGNANT NEOPLASM OF BOTH OVARIES (HCC): Primary | ICD-10-CM

## 2020-01-01 DIAGNOSIS — J45.909 ASTHMA, UNSPECIFIED ASTHMA SEVERITY, UNSPECIFIED WHETHER COMPLICATED, UNSPECIFIED WHETHER PERSISTENT: ICD-10-CM

## 2020-01-01 DIAGNOSIS — R19.00 PELVIC MASS: ICD-10-CM

## 2020-01-01 DIAGNOSIS — E11.42 TYPE 2 DIABETES MELLITUS WITH DIABETIC POLYNEUROPATHY, WITHOUT LONG-TERM CURRENT USE OF INSULIN (HCC): ICD-10-CM

## 2020-01-01 DIAGNOSIS — Z11.59 SCREENING FOR VIRAL DISEASE: Primary | ICD-10-CM

## 2020-01-01 DIAGNOSIS — G62.0 NEUROPATHY DUE TO CHEMOTHERAPEUTIC DRUG (HCC): ICD-10-CM

## 2020-01-01 DIAGNOSIS — F32.9 REACTIVE DEPRESSION: ICD-10-CM

## 2020-01-01 DIAGNOSIS — R06.02 SHORTNESS OF BREATH: ICD-10-CM

## 2020-01-01 DIAGNOSIS — Z95.828 PORT-A-CATH IN PLACE: ICD-10-CM

## 2020-01-01 DIAGNOSIS — T45.1X5A NEUROPATHY DUE TO CHEMOTHERAPEUTIC DRUG (HCC): ICD-10-CM

## 2020-01-01 DIAGNOSIS — N18.30 CKD (CHRONIC KIDNEY DISEASE) STAGE 3, GFR 30-59 ML/MIN (HCC): ICD-10-CM

## 2020-01-01 DIAGNOSIS — R22.2 CHEST MASS: ICD-10-CM

## 2020-01-01 DIAGNOSIS — R06.02 SOB (SHORTNESS OF BREATH) ON EXERTION: ICD-10-CM

## 2020-01-01 DIAGNOSIS — D64.81 ANEMIA ASSOCIATED WITH CHEMOTHERAPY: ICD-10-CM

## 2020-01-01 DIAGNOSIS — T45.1X5A CINV (CHEMOTHERAPY-INDUCED NAUSEA AND VOMITING): ICD-10-CM

## 2020-01-01 DIAGNOSIS — R22.41 LOCALIZED SWELLING, MASS, OR LUMP OF RIGHT LOWER EXTREMITY: Primary | ICD-10-CM

## 2020-01-01 DIAGNOSIS — Z79.4 TYPE 2 DIABETES MELLITUS WITH HYPERGLYCEMIA, WITH LONG-TERM CURRENT USE OF INSULIN (HCC): ICD-10-CM

## 2020-01-01 DIAGNOSIS — K21.9 GASTROESOPHAGEAL REFLUX DISEASE, ESOPHAGITIS PRESENCE NOT SPECIFIED: ICD-10-CM

## 2020-01-01 DIAGNOSIS — R06.02 SHORTNESS OF BREATH: Primary | ICD-10-CM

## 2020-01-01 DIAGNOSIS — G89.29 CHRONIC PAIN NOT DUE TO MALIGNANCY: ICD-10-CM

## 2020-01-01 DIAGNOSIS — N13.1 HYDRONEPHROSIS DUE TO OBSTRUCTION OF URETER: ICD-10-CM

## 2020-01-01 DIAGNOSIS — Z91.041 CONTRAST MEDIA ALLERGY: ICD-10-CM

## 2020-01-01 DIAGNOSIS — G62.9 PERIPHERAL POLYNEUROPATHY: ICD-10-CM

## 2020-01-01 LAB
ABO + RH BLD: NORMAL
ALBUMIN SERPL-MCNC: 2.9 G/DL (ref 3.5–5)
ALBUMIN SERPL-MCNC: 2.9 G/DL (ref 3.5–5)
ALBUMIN SERPL-MCNC: 3 G/DL (ref 3.5–5)
ALBUMIN SERPL-MCNC: 3.1 G/DL (ref 3.5–5)
ALBUMIN SERPL-MCNC: 3.2 G/DL (ref 3.5–5)
ALBUMIN SERPL-MCNC: 3.3 G/DL (ref 3.5–5)
ALBUMIN SERPL-MCNC: 3.4 G/DL (ref 3.5–5)
ALBUMIN SERPL-MCNC: 3.4 G/DL (ref 3.5–5)
ALBUMIN/GLOB SERPL: 0.6 {RATIO} (ref 1.1–2.2)
ALBUMIN/GLOB SERPL: 0.7 {RATIO} (ref 1.1–2.2)
ALP SERPL-CCNC: 134 U/L (ref 45–117)
ALP SERPL-CCNC: 215 U/L (ref 45–117)
ALP SERPL-CCNC: 60 U/L (ref 45–117)
ALP SERPL-CCNC: 65 U/L (ref 45–117)
ALP SERPL-CCNC: 67 U/L (ref 45–117)
ALP SERPL-CCNC: 89 U/L (ref 45–117)
ALP SERPL-CCNC: 90 U/L (ref 45–117)
ALP SERPL-CCNC: 94 U/L (ref 45–117)
ALT SERPL-CCNC: 17 U/L (ref 12–78)
ALT SERPL-CCNC: 20 U/L (ref 12–78)
ALT SERPL-CCNC: 20 U/L (ref 12–78)
ALT SERPL-CCNC: 25 U/L (ref 12–78)
ALT SERPL-CCNC: 28 U/L (ref 12–78)
ALT SERPL-CCNC: 30 U/L (ref 12–78)
ALT SERPL-CCNC: 58 U/L (ref 12–78)
ALT SERPL-CCNC: 66 U/L (ref 12–78)
ANION GAP SERPL CALC-SCNC: 3 MMOL/L (ref 5–15)
ANION GAP SERPL CALC-SCNC: 6 MMOL/L (ref 5–15)
ANION GAP SERPL CALC-SCNC: 7 MMOL/L (ref 5–15)
ANION GAP SERPL CALC-SCNC: 8 MMOL/L (ref 5–15)
APPEARANCE UR: ABNORMAL
AST SERPL-CCNC: 16 U/L (ref 15–37)
AST SERPL-CCNC: 18 U/L (ref 15–37)
AST SERPL-CCNC: 23 U/L (ref 15–37)
AST SERPL-CCNC: 26 U/L (ref 15–37)
AST SERPL-CCNC: 29 U/L (ref 15–37)
AST SERPL-CCNC: 48 U/L (ref 15–37)
AST SERPL-CCNC: 58 U/L (ref 15–37)
AST SERPL-CCNC: 65 U/L (ref 15–37)
BACTERIA URNS QL MICRO: 984.6 /HPF
BASOPHILS # BLD: 0 K/UL (ref 0–0.1)
BASOPHILS # BLD: 0.1 K/UL (ref 0–0.1)
BASOPHILS NFR BLD: 0 % (ref 0–1)
BASOPHILS NFR BLD: 1 % (ref 0–1)
BILIRUB SERPL-MCNC: 0.3 MG/DL (ref 0.2–1)
BILIRUB SERPL-MCNC: 0.3 MG/DL (ref 0.2–1)
BILIRUB SERPL-MCNC: 0.4 MG/DL (ref 0.2–1)
BILIRUB SERPL-MCNC: 0.5 MG/DL (ref 0.2–1)
BILIRUB SERPL-MCNC: 0.6 MG/DL (ref 0.2–1)
BILIRUB SERPL-MCNC: 0.7 MG/DL (ref 0.2–1)
BILIRUB UR QL: NEGATIVE
BLD PROD TYP BPU: NORMAL
BLOOD GROUP ANTIBODIES SERPL: NORMAL
BPU ID: NORMAL
BUN SERPL-MCNC: 21 MG/DL (ref 6–20)
BUN SERPL-MCNC: 26 MG/DL (ref 6–20)
BUN SERPL-MCNC: 26 MG/DL (ref 6–20)
BUN SERPL-MCNC: 27 MG/DL (ref 6–20)
BUN SERPL-MCNC: 27 MG/DL (ref 6–20)
BUN SERPL-MCNC: 28 MG/DL (ref 6–20)
BUN SERPL-MCNC: 29 MG/DL (ref 6–20)
BUN SERPL-MCNC: 29 MG/DL (ref 6–20)
BUN SERPL-MCNC: 30 MG/DL (ref 6–20)
BUN SERPL-MCNC: 30 MG/DL (ref 6–20)
BUN SERPL-MCNC: 31 MG/DL (ref 6–20)
BUN SERPL-MCNC: 31 MG/DL (ref 6–20)
BUN SERPL-MCNC: 32 MG/DL (ref 6–20)
BUN SERPL-MCNC: 32 MG/DL (ref 6–20)
BUN SERPL-MCNC: 35 MG/DL (ref 6–20)
BUN SERPL-MCNC: 37 MG/DL (ref 6–20)
BUN SERPL-MCNC: 44 MG/DL (ref 6–20)
BUN SERPL-MCNC: 50 MG/DL (ref 6–20)
BUN/CREAT SERPL: 17 (ref 12–20)
BUN/CREAT SERPL: 19 (ref 12–20)
BUN/CREAT SERPL: 19 (ref 12–20)
BUN/CREAT SERPL: 20 (ref 12–20)
BUN/CREAT SERPL: 21 (ref 12–20)
BUN/CREAT SERPL: 21 (ref 12–20)
BUN/CREAT SERPL: 22 (ref 12–20)
BUN/CREAT SERPL: 22 (ref 12–20)
BUN/CREAT SERPL: 23 (ref 12–20)
BUN/CREAT SERPL: 23 (ref 12–20)
BUN/CREAT SERPL: 24 (ref 12–20)
BUN/CREAT SERPL: 24 (ref 12–20)
BUN/CREAT SERPL: 25 (ref 12–20)
BUN/CREAT SERPL: 25 (ref 12–20)
BUN/CREAT SERPL: 27 (ref 12–20)
BUN/CREAT SERPL: 27 (ref 12–20)
CALCIUM SERPL-MCNC: 8.3 MG/DL (ref 8.5–10.1)
CALCIUM SERPL-MCNC: 8.6 MG/DL (ref 8.5–10.1)
CALCIUM SERPL-MCNC: 8.7 MG/DL (ref 8.5–10.1)
CALCIUM SERPL-MCNC: 8.7 MG/DL (ref 8.5–10.1)
CALCIUM SERPL-MCNC: 8.8 MG/DL (ref 8.5–10.1)
CALCIUM SERPL-MCNC: 8.8 MG/DL (ref 8.5–10.1)
CALCIUM SERPL-MCNC: 8.9 MG/DL (ref 8.5–10.1)
CALCIUM SERPL-MCNC: 9 MG/DL (ref 8.5–10.1)
CALCIUM SERPL-MCNC: 9.1 MG/DL (ref 8.5–10.1)
CALCIUM SERPL-MCNC: 9.2 MG/DL (ref 8.5–10.1)
CANCER AG125 SERPL-ACNC: 116 U/ML (ref 1.5–35)
CANCER AG125 SERPL-ACNC: 187 U/ML (ref 1.5–35)
CANCER AG125 SERPL-ACNC: 233 U/ML (ref 1.5–35)
CANCER AG125 SERPL-ACNC: 268 U/ML (ref 1.5–35)
CANCER AG125 SERPL-ACNC: 46 U/ML (ref 1.5–35)
CANCER AG125 SERPL-ACNC: 48 U/ML (ref 1.5–35)
CANCER AG125 SERPL-ACNC: 52 U/ML (ref 1.5–35)
CHLORIDE SERPL-SCNC: 107 MMOL/L (ref 97–108)
CHLORIDE SERPL-SCNC: 108 MMOL/L (ref 97–108)
CHLORIDE SERPL-SCNC: 109 MMOL/L (ref 97–108)
CHLORIDE SERPL-SCNC: 110 MMOL/L (ref 97–108)
CHLORIDE SERPL-SCNC: 110 MMOL/L (ref 97–108)
CHLORIDE SERPL-SCNC: 111 MMOL/L (ref 97–108)
CHLORIDE SERPL-SCNC: 111 MMOL/L (ref 97–108)
CHLORIDE SERPL-SCNC: 112 MMOL/L (ref 97–108)
CHLORIDE SERPL-SCNC: 113 MMOL/L (ref 97–108)
CHLORIDE SERPL-SCNC: 113 MMOL/L (ref 97–108)
CHLORIDE SERPL-SCNC: 114 MMOL/L (ref 97–108)
CHLORIDE SERPL-SCNC: 114 MMOL/L (ref 97–108)
CHLORIDE SERPL-SCNC: 115 MMOL/L (ref 97–108)
CHLORIDE SERPL-SCNC: 115 MMOL/L (ref 97–108)
CO2 SERPL-SCNC: 18 MMOL/L (ref 21–32)
CO2 SERPL-SCNC: 19 MMOL/L (ref 21–32)
CO2 SERPL-SCNC: 20 MMOL/L (ref 21–32)
CO2 SERPL-SCNC: 22 MMOL/L (ref 21–32)
CO2 SERPL-SCNC: 22 MMOL/L (ref 21–32)
CO2 SERPL-SCNC: 23 MMOL/L (ref 21–32)
CO2 SERPL-SCNC: 24 MMOL/L (ref 21–32)
CO2 SERPL-SCNC: 24 MMOL/L (ref 21–32)
CO2 SERPL-SCNC: 25 MMOL/L (ref 21–32)
CO2 SERPL-SCNC: 26 MMOL/L (ref 21–32)
COLOR UR: ABNORMAL
CREAT SERPL-MCNC: 1.2 MG/DL (ref 0.55–1.02)
CREAT SERPL-MCNC: 1.25 MG/DL (ref 0.55–1.02)
CREAT SERPL-MCNC: 1.25 MG/DL (ref 0.55–1.02)
CREAT SERPL-MCNC: 1.28 MG/DL (ref 0.55–1.02)
CREAT SERPL-MCNC: 1.28 MG/DL (ref 0.55–1.02)
CREAT SERPL-MCNC: 1.3 MG/DL (ref 0.55–1.02)
CREAT SERPL-MCNC: 1.31 MG/DL (ref 0.55–1.02)
CREAT SERPL-MCNC: 1.32 MG/DL (ref 0.55–1.02)
CREAT SERPL-MCNC: 1.39 MG/DL (ref 0.55–1.02)
CREAT SERPL-MCNC: 1.41 MG/DL (ref 0.55–1.02)
CREAT SERPL-MCNC: 1.42 MG/DL (ref 0.55–1.02)
CREAT SERPL-MCNC: 1.46 MG/DL (ref 0.55–1.02)
CREAT SERPL-MCNC: 1.49 MG/DL (ref 0.55–1.02)
CREAT SERPL-MCNC: 1.49 MG/DL (ref 0.55–1.02)
CREAT SERPL-MCNC: 1.55 MG/DL (ref 0.55–1.02)
CREAT SERPL-MCNC: 1.63 MG/DL (ref 0.55–1.02)
CREAT SERPL-MCNC: 1.87 MG/DL (ref 0.55–1.02)
CREAT SERPL-MCNC: 1.98 MG/DL (ref 0.55–1.02)
CROSSMATCH RESULT,%XM: NORMAL
DIFFERENTIAL METHOD BLD: ABNORMAL
EOSINOPHIL # BLD: 0 K/UL (ref 0–0.4)
EOSINOPHIL # BLD: 0 K/UL (ref 0–0.4)
EOSINOPHIL # BLD: 0.1 K/UL (ref 0–0.4)
EOSINOPHIL # BLD: 0.2 K/UL (ref 0–0.4)
EOSINOPHIL # BLD: 0.3 K/UL (ref 0–0.4)
EOSINOPHIL # BLD: 0.4 K/UL (ref 0–0.4)
EOSINOPHIL # BLD: 0.6 K/UL (ref 0–0.4)
EOSINOPHIL # BLD: 0.7 K/UL (ref 0–0.4)
EOSINOPHIL NFR BLD: 0 % (ref 0–7)
EOSINOPHIL NFR BLD: 1 % (ref 0–7)
EOSINOPHIL NFR BLD: 11 % (ref 0–7)
EOSINOPHIL NFR BLD: 2 % (ref 0–7)
EOSINOPHIL NFR BLD: 3 % (ref 0–7)
EOSINOPHIL NFR BLD: 4 % (ref 0–7)
EOSINOPHIL NFR BLD: 5 % (ref 0–7)
EOSINOPHIL NFR BLD: 9 % (ref 0–7)
EPITH CASTS URNS QL MICRO: 0 /LPF
ERYTHROCYTE [DISTWIDTH] IN BLOOD BY AUTOMATED COUNT: 13.7 % (ref 11.5–14.5)
ERYTHROCYTE [DISTWIDTH] IN BLOOD BY AUTOMATED COUNT: 13.7 % (ref 11.5–14.5)
ERYTHROCYTE [DISTWIDTH] IN BLOOD BY AUTOMATED COUNT: 13.9 % (ref 11.5–14.5)
ERYTHROCYTE [DISTWIDTH] IN BLOOD BY AUTOMATED COUNT: 13.9 % (ref 11.5–14.5)
ERYTHROCYTE [DISTWIDTH] IN BLOOD BY AUTOMATED COUNT: 14.3 % (ref 11.5–14.5)
ERYTHROCYTE [DISTWIDTH] IN BLOOD BY AUTOMATED COUNT: 14.8 % (ref 11.5–14.5)
ERYTHROCYTE [DISTWIDTH] IN BLOOD BY AUTOMATED COUNT: 14.9 % (ref 11.5–14.5)
ERYTHROCYTE [DISTWIDTH] IN BLOOD BY AUTOMATED COUNT: 15.7 % (ref 11.5–14.5)
ERYTHROCYTE [DISTWIDTH] IN BLOOD BY AUTOMATED COUNT: 17.1 % (ref 11.5–14.5)
ERYTHROCYTE [DISTWIDTH] IN BLOOD BY AUTOMATED COUNT: 17.4 % (ref 11.5–14.5)
ERYTHROCYTE [DISTWIDTH] IN BLOOD BY AUTOMATED COUNT: 18.3 % (ref 11.5–14.5)
ERYTHROCYTE [DISTWIDTH] IN BLOOD BY AUTOMATED COUNT: 18.4 % (ref 11.5–14.5)
ERYTHROCYTE [DISTWIDTH] IN BLOOD BY AUTOMATED COUNT: 18.5 % (ref 11.5–14.5)
ERYTHROCYTE [DISTWIDTH] IN BLOOD BY AUTOMATED COUNT: 18.7 % (ref 11.5–14.5)
ERYTHROCYTE [DISTWIDTH] IN BLOOD BY AUTOMATED COUNT: 18.8 % (ref 11.5–14.5)
ERYTHROCYTE [DISTWIDTH] IN BLOOD BY AUTOMATED COUNT: 19 % (ref 11.5–14.5)
ERYTHROCYTE [DISTWIDTH] IN BLOOD BY AUTOMATED COUNT: 19.3 % (ref 11.5–14.5)
ERYTHROCYTE [DISTWIDTH] IN BLOOD BY AUTOMATED COUNT: 19.5 % (ref 11.5–14.5)
ERYTHROCYTE [DISTWIDTH] IN BLOOD BY AUTOMATED COUNT: 20.4 % (ref 11.5–14.5)
ERYTHROCYTE [DISTWIDTH] IN BLOOD BY AUTOMATED COUNT: 21.2 % (ref 11.5–14.5)
GLOBULIN SER CALC-MCNC: 4.2 G/DL (ref 2–4)
GLOBULIN SER CALC-MCNC: 4.7 G/DL (ref 2–4)
GLOBULIN SER CALC-MCNC: 4.8 G/DL (ref 2–4)
GLOBULIN SER CALC-MCNC: 4.9 G/DL (ref 2–4)
GLOBULIN SER CALC-MCNC: 5.3 G/DL (ref 2–4)
GLOBULIN SER CALC-MCNC: 5.5 G/DL (ref 2–4)
GLUCOSE SERPL-MCNC: 110 MG/DL (ref 65–100)
GLUCOSE SERPL-MCNC: 110 MG/DL (ref 65–100)
GLUCOSE SERPL-MCNC: 111 MG/DL (ref 65–100)
GLUCOSE SERPL-MCNC: 122 MG/DL (ref 65–100)
GLUCOSE SERPL-MCNC: 131 MG/DL (ref 65–100)
GLUCOSE SERPL-MCNC: 141 MG/DL (ref 65–100)
GLUCOSE SERPL-MCNC: 152 MG/DL (ref 65–100)
GLUCOSE SERPL-MCNC: 155 MG/DL (ref 65–100)
GLUCOSE SERPL-MCNC: 163 MG/DL (ref 65–100)
GLUCOSE SERPL-MCNC: 165 MG/DL (ref 65–100)
GLUCOSE SERPL-MCNC: 167 MG/DL (ref 65–100)
GLUCOSE SERPL-MCNC: 172 MG/DL (ref 65–100)
GLUCOSE SERPL-MCNC: 174 MG/DL (ref 65–100)
GLUCOSE SERPL-MCNC: 181 MG/DL (ref 65–100)
GLUCOSE SERPL-MCNC: 189 MG/DL (ref 65–100)
GLUCOSE SERPL-MCNC: 190 MG/DL (ref 65–100)
GLUCOSE SERPL-MCNC: 90 MG/DL (ref 65–100)
GLUCOSE SERPL-MCNC: 99 MG/DL (ref 65–100)
GLUCOSE UR STRIP.AUTO-MCNC: NEGATIVE MG/DL
HCT VFR BLD AUTO: 19.7 % (ref 35–47)
HCT VFR BLD AUTO: 20.7 % (ref 35–47)
HCT VFR BLD AUTO: 20.7 % (ref 35–47)
HCT VFR BLD AUTO: 21.7 % (ref 35–47)
HCT VFR BLD AUTO: 21.7 % (ref 35–47)
HCT VFR BLD AUTO: 22 % (ref 35–47)
HCT VFR BLD AUTO: 22.4 % (ref 35–47)
HCT VFR BLD AUTO: 22.5 % (ref 35–47)
HCT VFR BLD AUTO: 23.1 % (ref 35–47)
HCT VFR BLD AUTO: 24 % (ref 35–47)
HCT VFR BLD AUTO: 24.1 % (ref 35–47)
HCT VFR BLD AUTO: 24.2 % (ref 35–47)
HCT VFR BLD AUTO: 24.5 % (ref 35–47)
HCT VFR BLD AUTO: 24.6 % (ref 35–47)
HCT VFR BLD AUTO: 24.9 % (ref 35–47)
HCT VFR BLD AUTO: 25 % (ref 35–47)
HCT VFR BLD AUTO: 25.1 % (ref 35–47)
HCT VFR BLD AUTO: 25.3 % (ref 35–47)
HCT VFR BLD AUTO: 25.3 % (ref 35–47)
HCT VFR BLD AUTO: 25.4 % (ref 35–47)
HGB BLD-MCNC: 5.9 G/DL (ref 11.5–16)
HGB BLD-MCNC: 6.1 G/DL (ref 11.5–16)
HGB BLD-MCNC: 6.3 G/DL (ref 11.5–16)
HGB BLD-MCNC: 6.5 G/DL (ref 11.5–16)
HGB BLD-MCNC: 6.6 G/DL (ref 11.5–16)
HGB BLD-MCNC: 6.6 G/DL (ref 11.5–16)
HGB BLD-MCNC: 6.8 G/DL (ref 11.5–16)
HGB BLD-MCNC: 6.8 G/DL (ref 11.5–16)
HGB BLD-MCNC: 7 G/DL (ref 11.5–16)
HGB BLD-MCNC: 7.1 G/DL (ref 11.5–16)
HGB BLD-MCNC: 7.4 G/DL (ref 11.5–16)
HGB BLD-MCNC: 7.5 G/DL (ref 11.5–16)
HGB BLD-MCNC: 7.5 G/DL (ref 11.5–16)
HGB BLD-MCNC: 7.6 G/DL (ref 11.5–16)
HGB BLD-MCNC: 7.7 G/DL (ref 11.5–16)
HGB BLD-MCNC: 7.8 G/DL (ref 11.5–16)
HGB BLD-MCNC: 7.8 G/DL (ref 11.5–16)
HGB UR QL STRIP: ABNORMAL
HISTORY CHECKED?,CKHIST: NORMAL
HYALINE CASTS URNS QL MICRO: 0.8 /LPF (ref 0–5)
IMM GRANULOCYTES # BLD AUTO: 0 K/UL
IMM GRANULOCYTES # BLD AUTO: 0 K/UL (ref 0–0.04)
IMM GRANULOCYTES # BLD AUTO: 0.1 K/UL (ref 0–0.04)
IMM GRANULOCYTES # BLD AUTO: 0.2 K/UL (ref 0–0.04)
IMM GRANULOCYTES # BLD AUTO: 0.5 K/UL (ref 0–0.04)
IMM GRANULOCYTES NFR BLD AUTO: 0 %
IMM GRANULOCYTES NFR BLD AUTO: 0 % (ref 0–0.5)
IMM GRANULOCYTES NFR BLD AUTO: 1 % (ref 0–0.5)
IMM GRANULOCYTES NFR BLD AUTO: 2 % (ref 0–0.5)
IMM GRANULOCYTES NFR BLD AUTO: 6 % (ref 0–0.5)
KETONES UR QL STRIP.AUTO: NEGATIVE MG/DL
LEUKOCYTE ESTERASE UR QL STRIP.AUTO: ABNORMAL
LYMPHOCYTES # BLD: 0.5 K/UL (ref 0.8–3.5)
LYMPHOCYTES # BLD: 0.5 K/UL (ref 0.8–3.5)
LYMPHOCYTES # BLD: 0.6 K/UL (ref 0.8–3.5)
LYMPHOCYTES # BLD: 0.7 K/UL (ref 0.8–3.5)
LYMPHOCYTES # BLD: 0.8 K/UL (ref 0.8–3.5)
LYMPHOCYTES # BLD: 0.8 K/UL (ref 0.8–3.5)
LYMPHOCYTES # BLD: 0.9 K/UL (ref 0.8–3.5)
LYMPHOCYTES # BLD: 0.9 K/UL (ref 0.8–3.5)
LYMPHOCYTES # BLD: 1 K/UL (ref 0.8–3.5)
LYMPHOCYTES # BLD: 1.1 K/UL (ref 0.8–3.5)
LYMPHOCYTES # BLD: 1.1 K/UL (ref 0.8–3.5)
LYMPHOCYTES # BLD: 1.2 K/UL (ref 0.8–3.5)
LYMPHOCYTES # BLD: 1.2 K/UL (ref 0.8–3.5)
LYMPHOCYTES # BLD: 1.3 K/UL (ref 0.8–3.5)
LYMPHOCYTES # BLD: 1.3 K/UL (ref 0.8–3.5)
LYMPHOCYTES # BLD: 1.4 K/UL (ref 0.8–3.5)
LYMPHOCYTES NFR BLD: 10 % (ref 12–49)
LYMPHOCYTES NFR BLD: 10 % (ref 12–49)
LYMPHOCYTES NFR BLD: 14 % (ref 12–49)
LYMPHOCYTES NFR BLD: 17 % (ref 12–49)
LYMPHOCYTES NFR BLD: 18 % (ref 12–49)
LYMPHOCYTES NFR BLD: 18 % (ref 12–49)
LYMPHOCYTES NFR BLD: 19 % (ref 12–49)
LYMPHOCYTES NFR BLD: 19 % (ref 12–49)
LYMPHOCYTES NFR BLD: 21 % (ref 12–49)
LYMPHOCYTES NFR BLD: 22 % (ref 12–49)
LYMPHOCYTES NFR BLD: 23 % (ref 12–49)
LYMPHOCYTES NFR BLD: 24 % (ref 12–49)
LYMPHOCYTES NFR BLD: 26 % (ref 12–49)
LYMPHOCYTES NFR BLD: 27 % (ref 12–49)
LYMPHOCYTES NFR BLD: 32 % (ref 12–49)
LYMPHOCYTES NFR BLD: 7 % (ref 12–49)
MAGNESIUM SERPL-MCNC: 1.8 MG/DL (ref 1.6–2.4)
MAGNESIUM SERPL-MCNC: 1.9 MG/DL (ref 1.6–2.4)
MAGNESIUM SERPL-MCNC: 2.2 MG/DL (ref 1.6–2.4)
MCH RBC QN AUTO: 27.7 PG (ref 26–34)
MCH RBC QN AUTO: 28.1 PG (ref 26–34)
MCH RBC QN AUTO: 28.2 PG (ref 26–34)
MCH RBC QN AUTO: 28.3 PG (ref 26–34)
MCH RBC QN AUTO: 28.3 PG (ref 26–34)
MCH RBC QN AUTO: 28.5 PG (ref 26–34)
MCH RBC QN AUTO: 28.5 PG (ref 26–34)
MCH RBC QN AUTO: 28.6 PG (ref 26–34)
MCH RBC QN AUTO: 28.7 PG (ref 26–34)
MCH RBC QN AUTO: 28.7 PG (ref 26–34)
MCH RBC QN AUTO: 28.8 PG (ref 26–34)
MCH RBC QN AUTO: 28.8 PG (ref 26–34)
MCH RBC QN AUTO: 29 PG (ref 26–34)
MCH RBC QN AUTO: 29 PG (ref 26–34)
MCH RBC QN AUTO: 29.1 PG (ref 26–34)
MCH RBC QN AUTO: 29.2 PG (ref 26–34)
MCH RBC QN AUTO: 29.8 PG (ref 26–34)
MCH RBC QN AUTO: 29.9 PG (ref 26–34)
MCHC RBC AUTO-ENTMCNC: 29.2 G/DL (ref 30–36.5)
MCHC RBC AUTO-ENTMCNC: 29.5 G/DL (ref 30–36.5)
MCHC RBC AUTO-ENTMCNC: 29.5 G/DL (ref 30–36.5)
MCHC RBC AUTO-ENTMCNC: 29.9 G/DL (ref 30–36.5)
MCHC RBC AUTO-ENTMCNC: 30 G/DL (ref 30–36.5)
MCHC RBC AUTO-ENTMCNC: 30.2 G/DL (ref 30–36.5)
MCHC RBC AUTO-ENTMCNC: 30.2 G/DL (ref 30–36.5)
MCHC RBC AUTO-ENTMCNC: 30.3 G/DL (ref 30–36.5)
MCHC RBC AUTO-ENTMCNC: 30.4 G/DL (ref 30–36.5)
MCHC RBC AUTO-ENTMCNC: 30.5 G/DL (ref 30–36.5)
MCHC RBC AUTO-ENTMCNC: 30.6 G/DL (ref 30–36.5)
MCHC RBC AUTO-ENTMCNC: 30.7 G/DL (ref 30–36.5)
MCHC RBC AUTO-ENTMCNC: 30.8 G/DL (ref 30–36.5)
MCHC RBC AUTO-ENTMCNC: 30.9 G/DL (ref 30–36.5)
MCHC RBC AUTO-ENTMCNC: 31.1 G/DL (ref 30–36.5)
MCV RBC AUTO: 92.2 FL (ref 80–99)
MCV RBC AUTO: 92.7 FL (ref 80–99)
MCV RBC AUTO: 93.1 FL (ref 80–99)
MCV RBC AUTO: 93.8 FL (ref 80–99)
MCV RBC AUTO: 93.9 FL (ref 80–99)
MCV RBC AUTO: 94 FL (ref 80–99)
MCV RBC AUTO: 94.1 FL (ref 80–99)
MCV RBC AUTO: 94.1 FL (ref 80–99)
MCV RBC AUTO: 94.5 FL (ref 80–99)
MCV RBC AUTO: 95.6 FL (ref 80–99)
MCV RBC AUTO: 95.7 FL (ref 80–99)
MCV RBC AUTO: 95.8 FL (ref 80–99)
MCV RBC AUTO: 95.9 FL (ref 80–99)
MCV RBC AUTO: 96.6 FL (ref 80–99)
MCV RBC AUTO: 96.9 FL (ref 80–99)
MCV RBC AUTO: 97.6 FL (ref 80–99)
MCV RBC AUTO: 97.6 FL (ref 80–99)
MCV RBC AUTO: 98 FL (ref 80–99)
MCV RBC AUTO: 98.2 FL (ref 80–99)
MCV RBC AUTO: 98.8 FL (ref 80–99)
METAMYELOCYTES NFR BLD MANUAL: 1 %
METAMYELOCYTES NFR BLD MANUAL: 2 %
MONOCYTES # BLD: 0.1 K/UL (ref 0–1)
MONOCYTES # BLD: 0.2 K/UL (ref 0–1)
MONOCYTES # BLD: 0.2 K/UL (ref 0–1)
MONOCYTES # BLD: 0.3 K/UL (ref 0–1)
MONOCYTES # BLD: 0.4 K/UL (ref 0–1)
MONOCYTES # BLD: 0.5 K/UL (ref 0–1)
MONOCYTES # BLD: 0.6 K/UL (ref 0–1)
MONOCYTES # BLD: 0.7 K/UL (ref 0–1)
MONOCYTES # BLD: 0.7 K/UL (ref 0–1)
MONOCYTES # BLD: 0.8 K/UL (ref 0–1)
MONOCYTES # BLD: 0.9 K/UL (ref 0–1)
MONOCYTES # BLD: 0.9 K/UL (ref 0–1)
MONOCYTES NFR BLD: 1 % (ref 5–13)
MONOCYTES NFR BLD: 10 % (ref 5–13)
MONOCYTES NFR BLD: 10 % (ref 5–13)
MONOCYTES NFR BLD: 11 % (ref 5–13)
MONOCYTES NFR BLD: 11 % (ref 5–13)
MONOCYTES NFR BLD: 12 % (ref 5–13)
MONOCYTES NFR BLD: 13 % (ref 5–13)
MONOCYTES NFR BLD: 13 % (ref 5–13)
MONOCYTES NFR BLD: 16 % (ref 5–13)
MONOCYTES NFR BLD: 17 % (ref 5–13)
MONOCYTES NFR BLD: 20 % (ref 5–13)
MONOCYTES NFR BLD: 3 % (ref 5–13)
MONOCYTES NFR BLD: 5 % (ref 5–13)
MONOCYTES NFR BLD: 6 % (ref 5–13)
MONOCYTES NFR BLD: 7 % (ref 5–13)
MONOCYTES NFR BLD: 7 % (ref 5–13)
MONOCYTES NFR BLD: 8 % (ref 5–13)
MONOCYTES NFR BLD: 8 % (ref 5–13)
MONOCYTES NFR BLD: 9 % (ref 5–13)
MONOCYTES NFR BLD: 9 % (ref 5–13)
MYELOCYTES NFR BLD MANUAL: 1 %
MYELOCYTES NFR BLD MANUAL: 1 %
NEUTS BAND NFR BLD MANUAL: 1 % (ref 0–6)
NEUTS BAND NFR BLD MANUAL: 1 % (ref 0–6)
NEUTS BAND NFR BLD MANUAL: 2 % (ref 0–6)
NEUTS SEG # BLD: 2 K/UL (ref 1.8–8)
NEUTS SEG # BLD: 2 K/UL (ref 1.8–8)
NEUTS SEG # BLD: 2.1 K/UL (ref 1.8–8)
NEUTS SEG # BLD: 2.2 K/UL (ref 1.8–8)
NEUTS SEG # BLD: 2.2 K/UL (ref 1.8–8)
NEUTS SEG # BLD: 2.3 K/UL (ref 1.8–8)
NEUTS SEG # BLD: 2.6 K/UL (ref 1.8–8)
NEUTS SEG # BLD: 2.7 K/UL (ref 1.8–8)
NEUTS SEG # BLD: 2.7 K/UL (ref 1.8–8)
NEUTS SEG # BLD: 2.9 K/UL (ref 1.8–8)
NEUTS SEG # BLD: 3.6 K/UL (ref 1.8–8)
NEUTS SEG # BLD: 3.6 K/UL (ref 1.8–8)
NEUTS SEG # BLD: 3.9 K/UL (ref 1.8–8)
NEUTS SEG # BLD: 4.2 K/UL (ref 1.8–8)
NEUTS SEG # BLD: 5.1 K/UL (ref 1.8–8)
NEUTS SEG # BLD: 5.4 K/UL (ref 1.8–8)
NEUTS SEG # BLD: 5.7 K/UL (ref 1.8–8)
NEUTS SEG # BLD: 5.8 K/UL (ref 1.8–8)
NEUTS SEG # BLD: 6.2 K/UL (ref 1.8–8)
NEUTS SEG # BLD: 6.4 K/UL (ref 1.8–8)
NEUTS SEG NFR BLD: 47 % (ref 32–75)
NEUTS SEG NFR BLD: 54 % (ref 32–75)
NEUTS SEG NFR BLD: 55 % (ref 32–75)
NEUTS SEG NFR BLD: 56 % (ref 32–75)
NEUTS SEG NFR BLD: 57 % (ref 32–75)
NEUTS SEG NFR BLD: 58 % (ref 32–75)
NEUTS SEG NFR BLD: 60 % (ref 32–75)
NEUTS SEG NFR BLD: 61 % (ref 32–75)
NEUTS SEG NFR BLD: 63 % (ref 32–75)
NEUTS SEG NFR BLD: 67 % (ref 32–75)
NEUTS SEG NFR BLD: 68 % (ref 32–75)
NEUTS SEG NFR BLD: 69 % (ref 32–75)
NEUTS SEG NFR BLD: 70 % (ref 32–75)
NEUTS SEG NFR BLD: 71 % (ref 32–75)
NEUTS SEG NFR BLD: 72 % (ref 32–75)
NEUTS SEG NFR BLD: 81 % (ref 32–75)
NEUTS SEG NFR BLD: 81 % (ref 32–75)
NEUTS SEG NFR BLD: 82 % (ref 32–75)
NITRITE UR QL STRIP.AUTO: POSITIVE
NRBC # BLD: 0 K/UL (ref 0–0.01)
NRBC # BLD: 0.02 K/UL (ref 0–0.01)
NRBC # BLD: 0.04 K/UL (ref 0–0.01)
NRBC # BLD: 0.06 K/UL (ref 0–0.01)
NRBC # BLD: 0.09 K/UL (ref 0–0.01)
NRBC # BLD: 0.1 K/UL (ref 0–0.01)
NRBC # BLD: 0.1 K/UL (ref 0–0.01)
NRBC # BLD: 0.12 K/UL (ref 0–0.01)
NRBC BLD-RTO: 0 PER 100 WBC
NRBC BLD-RTO: 0.3 PER 100 WBC
NRBC BLD-RTO: 0.3 PER 100 WBC
NRBC BLD-RTO: 0.5 PER 100 WBC
NRBC BLD-RTO: 1 PER 100 WBC
NRBC BLD-RTO: 1.2 PER 100 WBC
NRBC BLD-RTO: 1.3 PER 100 WBC
NRBC BLD-RTO: 1.4 PER 100 WBC
NRBC BLD-RTO: 1.5 PER 100 WBC
NRBC BLD-RTO: 1.7 PER 100 WBC
PATH REV BLD -IMP: ABNORMAL
PH UR STRIP: 6 [PH] (ref 5–8)
PLATELET # BLD AUTO: 152 K/UL (ref 150–400)
PLATELET # BLD AUTO: 155 K/UL (ref 150–400)
PLATELET # BLD AUTO: 156 K/UL (ref 150–400)
PLATELET # BLD AUTO: 162 K/UL (ref 150–400)
PLATELET # BLD AUTO: 163 K/UL (ref 150–400)
PLATELET # BLD AUTO: 169 K/UL (ref 150–400)
PLATELET # BLD AUTO: 175 K/UL (ref 150–400)
PLATELET # BLD AUTO: 186 K/UL (ref 150–400)
PLATELET # BLD AUTO: 188 K/UL (ref 150–400)
PLATELET # BLD AUTO: 208 K/UL (ref 150–400)
PLATELET # BLD AUTO: 210 K/UL (ref 150–400)
PLATELET # BLD AUTO: 214 K/UL (ref 150–400)
PLATELET # BLD AUTO: 220 K/UL (ref 150–400)
PLATELET # BLD AUTO: 223 K/UL (ref 150–400)
PLATELET # BLD AUTO: 230 K/UL (ref 150–400)
PLATELET # BLD AUTO: 231 K/UL (ref 150–400)
PLATELET # BLD AUTO: 235 K/UL (ref 150–400)
PLATELET # BLD AUTO: 251 K/UL (ref 150–400)
PLATELET # BLD AUTO: 273 K/UL (ref 150–400)
PLATELET # BLD AUTO: 284 K/UL (ref 150–400)
PMV BLD AUTO: 10.1 FL (ref 8.9–12.9)
PMV BLD AUTO: 10.4 FL (ref 8.9–12.9)
PMV BLD AUTO: 8.9 FL (ref 8.9–12.9)
PMV BLD AUTO: 9.1 FL (ref 8.9–12.9)
PMV BLD AUTO: 9.3 FL (ref 8.9–12.9)
PMV BLD AUTO: 9.3 FL (ref 8.9–12.9)
PMV BLD AUTO: 9.4 FL (ref 8.9–12.9)
PMV BLD AUTO: 9.4 FL (ref 8.9–12.9)
PMV BLD AUTO: 9.5 FL (ref 8.9–12.9)
PMV BLD AUTO: 9.6 FL (ref 8.9–12.9)
PMV BLD AUTO: 9.7 FL (ref 8.9–12.9)
PMV BLD AUTO: 9.7 FL (ref 8.9–12.9)
PMV BLD AUTO: 9.8 FL (ref 8.9–12.9)
PMV BLD AUTO: 9.8 FL (ref 8.9–12.9)
PMV BLD AUTO: 9.9 FL (ref 8.9–12.9)
PMV BLD AUTO: 9.9 FL (ref 8.9–12.9)
POTASSIUM SERPL-SCNC: 3.8 MMOL/L (ref 3.5–5.1)
POTASSIUM SERPL-SCNC: 4 MMOL/L (ref 3.5–5.1)
POTASSIUM SERPL-SCNC: 4.1 MMOL/L (ref 3.5–5.1)
POTASSIUM SERPL-SCNC: 4.1 MMOL/L (ref 3.5–5.1)
POTASSIUM SERPL-SCNC: 4.2 MMOL/L (ref 3.5–5.1)
POTASSIUM SERPL-SCNC: 4.3 MMOL/L (ref 3.5–5.1)
POTASSIUM SERPL-SCNC: 4.4 MMOL/L (ref 3.5–5.1)
POTASSIUM SERPL-SCNC: 4.4 MMOL/L (ref 3.5–5.1)
POTASSIUM SERPL-SCNC: 4.5 MMOL/L (ref 3.5–5.1)
POTASSIUM SERPL-SCNC: 4.6 MMOL/L (ref 3.5–5.1)
POTASSIUM SERPL-SCNC: 4.7 MMOL/L (ref 3.5–5.1)
POTASSIUM SERPL-SCNC: 4.9 MMOL/L (ref 3.5–5.1)
POTASSIUM SERPL-SCNC: 5 MMOL/L (ref 3.5–5.1)
POTASSIUM SERPL-SCNC: 5.3 MMOL/L (ref 3.5–5.1)
PROT SERPL-MCNC: 7.1 G/DL (ref 6.4–8.2)
PROT SERPL-MCNC: 7.6 G/DL (ref 6.4–8.2)
PROT SERPL-MCNC: 7.7 G/DL (ref 6.4–8.2)
PROT SERPL-MCNC: 7.8 G/DL (ref 6.4–8.2)
PROT SERPL-MCNC: 8 G/DL (ref 6.4–8.2)
PROT SERPL-MCNC: 8.2 G/DL (ref 6.4–8.2)
PROT SERPL-MCNC: 8.7 G/DL (ref 6.4–8.2)
PROT SERPL-MCNC: 8.9 G/DL (ref 6.4–8.2)
PROT UR STRIP-MCNC: 30 MG/DL
RBC # BLD AUTO: 2.06 M/UL (ref 3.8–5.2)
RBC # BLD AUTO: 2.19 M/UL (ref 3.8–5.2)
RBC # BLD AUTO: 2.2 M/UL (ref 3.8–5.2)
RBC # BLD AUTO: 2.28 M/UL (ref 3.8–5.2)
RBC # BLD AUTO: 2.3 M/UL (ref 3.8–5.2)
RBC # BLD AUTO: 2.31 M/UL (ref 3.8–5.2)
RBC # BLD AUTO: 2.33 M/UL (ref 3.8–5.2)
RBC # BLD AUTO: 2.4 M/UL (ref 3.8–5.2)
RBC # BLD AUTO: 2.41 M/UL (ref 3.8–5.2)
RBC # BLD AUTO: 2.47 M/UL (ref 3.8–5.2)
RBC # BLD AUTO: 2.48 M/UL (ref 3.8–5.2)
RBC # BLD AUTO: 2.48 M/UL (ref 3.8–5.2)
RBC # BLD AUTO: 2.51 M/UL (ref 3.8–5.2)
RBC # BLD AUTO: 2.55 M/UL (ref 3.8–5.2)
RBC # BLD AUTO: 2.58 M/UL (ref 3.8–5.2)
RBC # BLD AUTO: 2.62 M/UL (ref 3.8–5.2)
RBC # BLD AUTO: 2.64 M/UL (ref 3.8–5.2)
RBC # BLD AUTO: 2.67 M/UL (ref 3.8–5.2)
RBC # BLD AUTO: 2.7 M/UL (ref 3.8–5.2)
RBC # BLD AUTO: 2.74 M/UL (ref 3.8–5.2)
RBC #/AREA URNS HPF: 556 /HPF (ref 0–5)
RBC MORPH BLD: ABNORMAL
SARS-COV-2, NAA: NOT DETECTED
SODIUM SERPL-SCNC: 137 MMOL/L (ref 136–145)
SODIUM SERPL-SCNC: 138 MMOL/L (ref 136–145)
SODIUM SERPL-SCNC: 138 MMOL/L (ref 136–145)
SODIUM SERPL-SCNC: 139 MMOL/L (ref 136–145)
SODIUM SERPL-SCNC: 140 MMOL/L (ref 136–145)
SODIUM SERPL-SCNC: 140 MMOL/L (ref 136–145)
SODIUM SERPL-SCNC: 141 MMOL/L (ref 136–145)
SODIUM SERPL-SCNC: 142 MMOL/L (ref 136–145)
SODIUM SERPL-SCNC: 143 MMOL/L (ref 136–145)
SODIUM SERPL-SCNC: 143 MMOL/L (ref 136–145)
SP GR UR REFRACTOMETRY: 1.01 (ref 1–1.03)
SPECIMEN EXP DATE BLD: NORMAL
STATUS OF UNIT,%ST: NORMAL
UNIT DIVISION, %UDIV: 0
UROBILINOGEN UR QL STRIP.AUTO: 0.2 EU/DL (ref 0.2–1)
WBC # BLD AUTO: 2.8 K/UL (ref 3.6–11)
WBC # BLD AUTO: 3.2 K/UL (ref 3.6–11)
WBC # BLD AUTO: 3.7 K/UL (ref 3.6–11)
WBC # BLD AUTO: 3.9 K/UL (ref 3.6–11)
WBC # BLD AUTO: 4 K/UL (ref 3.6–11)
WBC # BLD AUTO: 4.2 K/UL (ref 3.6–11)
WBC # BLD AUTO: 4.2 K/UL (ref 3.6–11)
WBC # BLD AUTO: 4.3 K/UL (ref 3.6–11)
WBC # BLD AUTO: 4.7 K/UL (ref 3.6–11)
WBC # BLD AUTO: 5 K/UL (ref 3.6–11)
WBC # BLD AUTO: 5.7 K/UL (ref 3.6–11)
WBC # BLD AUTO: 5.8 K/UL (ref 3.6–11)
WBC # BLD AUTO: 6.3 K/UL (ref 3.6–11)
WBC # BLD AUTO: 6.3 K/UL (ref 3.6–11)
WBC # BLD AUTO: 6.5 K/UL (ref 3.6–11)
WBC # BLD AUTO: 7.1 K/UL (ref 3.6–11)
WBC # BLD AUTO: 7.6 K/UL (ref 3.6–11)
WBC # BLD AUTO: 8.1 K/UL (ref 3.6–11)
WBC # BLD AUTO: 8.3 K/UL (ref 3.6–11)
WBC # BLD AUTO: 9 K/UL (ref 3.6–11)
WBC MORPH BLD: ABNORMAL
WBC URNS QL MICRO: 106 /HPF (ref 0–4)

## 2020-01-01 PROCEDURE — 74011250636 HC RX REV CODE- 250/636: Performed by: PHYSICIAN ASSISTANT

## 2020-01-01 PROCEDURE — 74011000250 HC RX REV CODE- 250: Performed by: PHYSICIAN ASSISTANT

## 2020-01-01 PROCEDURE — 99213 OFFICE O/P EST LOW 20 MIN: CPT | Performed by: PHYSICIAN ASSISTANT

## 2020-01-01 PROCEDURE — 80053 COMPREHEN METABOLIC PANEL: CPT

## 2020-01-01 PROCEDURE — 74011250637 HC RX REV CODE- 250/637: Performed by: PHYSICIAN ASSISTANT

## 2020-01-01 PROCEDURE — 36415 COLL VENOUS BLD VENIPUNCTURE: CPT

## 2020-01-01 PROCEDURE — 74011000258 HC RX REV CODE- 258: Performed by: OBSTETRICS & GYNECOLOGY

## 2020-01-01 PROCEDURE — 77030012965 HC NDL HUBR BBMI -A

## 2020-01-01 PROCEDURE — 96375 TX/PRO/DX INJ NEW DRUG ADDON: CPT

## 2020-01-01 PROCEDURE — P9016 RBC LEUKOCYTES REDUCED: HCPCS

## 2020-01-01 PROCEDURE — 99214 OFFICE O/P EST MOD 30 MIN: CPT | Performed by: OBSTETRICS & GYNECOLOGY

## 2020-01-01 PROCEDURE — 80048 BASIC METABOLIC PNL TOTAL CA: CPT

## 2020-01-01 PROCEDURE — 74011636320 HC RX REV CODE- 636/320: Performed by: RADIOLOGY

## 2020-01-01 PROCEDURE — 74011250636 HC RX REV CODE- 250/636: Performed by: OBSTETRICS & GYNECOLOGY

## 2020-01-01 PROCEDURE — 85025 COMPLETE CBC W/AUTO DIFF WBC: CPT

## 2020-01-01 PROCEDURE — 86901 BLOOD TYPING SEROLOGIC RH(D): CPT

## 2020-01-01 PROCEDURE — 77030013169 SET IV BLD ICUM -A

## 2020-01-01 PROCEDURE — 83735 ASSAY OF MAGNESIUM: CPT

## 2020-01-01 PROCEDURE — 86923 COMPATIBILITY TEST ELECTRIC: CPT

## 2020-01-01 PROCEDURE — 86304 IMMUNOASSAY TUMOR CA 125: CPT

## 2020-01-01 PROCEDURE — 96413 CHEMO IV INFUSION 1 HR: CPT

## 2020-01-01 PROCEDURE — 74011000250 HC RX REV CODE- 250: Performed by: OBSTETRICS & GYNECOLOGY

## 2020-01-01 PROCEDURE — 36430 TRANSFUSION BLD/BLD COMPNT: CPT

## 2020-01-01 PROCEDURE — 74011000258 HC RX REV CODE- 258: Performed by: PHYSICIAN ASSISTANT

## 2020-01-01 PROCEDURE — 86900 BLOOD TYPING SEROLOGIC ABO: CPT

## 2020-01-01 PROCEDURE — 3046F HEMOGLOBIN A1C LEVEL >9.0%: CPT | Performed by: OBSTETRICS & GYNECOLOGY

## 2020-01-01 PROCEDURE — 93971 EXTREMITY STUDY: CPT

## 2020-01-01 PROCEDURE — 71046 X-RAY EXAM CHEST 2 VIEWS: CPT

## 2020-01-01 PROCEDURE — 71260 CT THORAX DX C+: CPT

## 2020-01-01 PROCEDURE — 3017F COLORECTAL CA SCREEN DOC REV: CPT | Performed by: OBSTETRICS & GYNECOLOGY

## 2020-01-01 PROCEDURE — 96411 CHEMO IV PUSH ADDL DRUG: CPT

## 2020-01-01 PROCEDURE — 99213 OFFICE O/P EST LOW 20 MIN: CPT | Performed by: NURSE PRACTITIONER

## 2020-01-01 PROCEDURE — G8427 DOCREV CUR MEDS BY ELIG CLIN: HCPCS | Performed by: OBSTETRICS & GYNECOLOGY

## 2020-01-01 PROCEDURE — 74011000258 HC RX REV CODE- 258: Performed by: RADIOLOGY

## 2020-01-01 PROCEDURE — G8754 DIAS BP LESS 90: HCPCS | Performed by: OBSTETRICS & GYNECOLOGY

## 2020-01-01 PROCEDURE — 99214 OFFICE O/P EST MOD 30 MIN: CPT | Performed by: PHYSICIAN ASSISTANT

## 2020-01-01 PROCEDURE — 74177 CT ABD & PELVIS W/CONTRAST: CPT

## 2020-01-01 PROCEDURE — 99442 PR PHYS/QHP TELEPHONE EVALUATION 11-20 MIN: CPT | Performed by: OBSTETRICS & GYNECOLOGY

## 2020-01-01 PROCEDURE — 2022F DILAT RTA XM EVC RTNOPTHY: CPT | Performed by: OBSTETRICS & GYNECOLOGY

## 2020-01-01 PROCEDURE — 36591 DRAW BLOOD OFF VENOUS DEVICE: CPT

## 2020-01-01 PROCEDURE — G8417 CALC BMI ABV UP PARAM F/U: HCPCS | Performed by: OBSTETRICS & GYNECOLOGY

## 2020-01-01 PROCEDURE — G9717 DOC PT DX DEP/BP F/U NT REQ: HCPCS | Performed by: OBSTETRICS & GYNECOLOGY

## 2020-01-01 PROCEDURE — 81001 URINALYSIS AUTO W/SCOPE: CPT

## 2020-01-01 PROCEDURE — G8753 SYS BP > OR = 140: HCPCS | Performed by: OBSTETRICS & GYNECOLOGY

## 2020-01-01 RX ORDER — EPINEPHRINE 1 MG/ML
0.3 INJECTION, SOLUTION, CONCENTRATE INTRAVENOUS AS NEEDED
Status: CANCELLED | OUTPATIENT
Start: 2020-01-01

## 2020-01-01 RX ORDER — DIPHENHYDRAMINE HYDROCHLORIDE 50 MG/ML
50 INJECTION, SOLUTION INTRAMUSCULAR; INTRAVENOUS AS NEEDED
Status: CANCELLED
Start: 2020-01-01

## 2020-01-01 RX ORDER — DIPHENHYDRAMINE HYDROCHLORIDE 50 MG/ML
50 INJECTION, SOLUTION INTRAMUSCULAR; INTRAVENOUS ONCE
Status: CANCELLED
Start: 2020-01-01

## 2020-01-01 RX ORDER — SODIUM CHLORIDE 0.9 % (FLUSH) 0.9 %
10 SYRINGE (ML) INJECTION AS NEEDED
Status: DISPENSED | OUTPATIENT
Start: 2020-01-01 | End: 2020-01-01

## 2020-01-01 RX ORDER — ONDANSETRON 2 MG/ML
8 INJECTION INTRAMUSCULAR; INTRAVENOUS AS NEEDED
Status: CANCELLED | OUTPATIENT
Start: 2020-01-01

## 2020-01-01 RX ORDER — EPINEPHRINE 1 MG/ML
0.3 INJECTION, SOLUTION, CONCENTRATE INTRAVENOUS AS NEEDED
Status: CANCELLED | OUTPATIENT
Start: 2021-01-01

## 2020-01-01 RX ORDER — HEPARIN 100 UNIT/ML
300-500 SYRINGE INTRAVENOUS AS NEEDED
Status: CANCELLED | OUTPATIENT
Start: 2021-01-01

## 2020-01-01 RX ORDER — PALONOSETRON 0.05 MG/ML
0.25 INJECTION, SOLUTION INTRAVENOUS ONCE
Status: CANCELLED | OUTPATIENT
Start: 2020-01-01 | End: 2020-01-01

## 2020-01-01 RX ORDER — PALONOSETRON 0.05 MG/ML
0.25 INJECTION, SOLUTION INTRAVENOUS ONCE
Status: COMPLETED | OUTPATIENT
Start: 2020-01-01 | End: 2020-01-01

## 2020-01-01 RX ORDER — SODIUM CHLORIDE 9 MG/ML
25 INJECTION, SOLUTION INTRAVENOUS CONTINUOUS
Status: DISPENSED | OUTPATIENT
Start: 2020-01-01 | End: 2020-01-01

## 2020-01-01 RX ORDER — SODIUM CHLORIDE 0.9 % (FLUSH) 0.9 %
10 SYRINGE (ML) INJECTION AS NEEDED
Status: CANCELLED
Start: 2020-01-01

## 2020-01-01 RX ORDER — POLYETHYLENE GLYCOL 3350 17 G/17G
17 POWDER, FOR SOLUTION ORAL DAILY
Qty: 60 PACKET | Refills: 0 | Status: SHIPPED | OUTPATIENT
Start: 2020-01-01 | End: 2020-01-01

## 2020-01-01 RX ORDER — SODIUM CHLORIDE 0.9 % (FLUSH) 0.9 %
10 SYRINGE (ML) INJECTION AS NEEDED
Status: CANCELLED | OUTPATIENT
Start: 2021-01-01

## 2020-01-01 RX ORDER — SODIUM CHLORIDE 9 MG/ML
10 INJECTION INTRAMUSCULAR; INTRAVENOUS; SUBCUTANEOUS AS NEEDED
Status: CANCELLED | OUTPATIENT
Start: 2020-01-01

## 2020-01-01 RX ORDER — SODIUM CHLORIDE 9 MG/ML
250 INJECTION, SOLUTION INTRAVENOUS AS NEEDED
Status: DISCONTINUED | OUTPATIENT
Start: 2020-01-01 | End: 2020-01-01 | Stop reason: HOSPADM

## 2020-01-01 RX ORDER — SODIUM CHLORIDE 9 MG/ML
25 INJECTION, SOLUTION INTRAVENOUS CONTINUOUS
Status: CANCELLED | OUTPATIENT
Start: 2020-01-01

## 2020-01-01 RX ORDER — SODIUM CHLORIDE 9 MG/ML
10 INJECTION INTRAMUSCULAR; INTRAVENOUS; SUBCUTANEOUS AS NEEDED
Status: ACTIVE | OUTPATIENT
Start: 2020-01-01 | End: 2020-01-01

## 2020-01-01 RX ORDER — HEPARIN 100 UNIT/ML
300-500 SYRINGE INTRAVENOUS AS NEEDED
Status: ACTIVE | OUTPATIENT
Start: 2020-01-01 | End: 2020-01-01

## 2020-01-01 RX ORDER — DIPHENHYDRAMINE HYDROCHLORIDE 50 MG/ML
25 INJECTION, SOLUTION INTRAMUSCULAR; INTRAVENOUS AS NEEDED
Status: CANCELLED
Start: 2020-01-01

## 2020-01-01 RX ORDER — HEPARIN 100 UNIT/ML
500 SYRINGE INTRAVENOUS AS NEEDED
Status: CANCELLED | OUTPATIENT
Start: 2020-01-01

## 2020-01-01 RX ORDER — SODIUM CHLORIDE 0.9 % (FLUSH) 0.9 %
5-10 SYRINGE (ML) INJECTION AS NEEDED
Status: CANCELLED | OUTPATIENT
Start: 2020-01-01

## 2020-01-01 RX ORDER — ALBUTEROL SULFATE 0.83 MG/ML
2.5 SOLUTION RESPIRATORY (INHALATION) AS NEEDED
Status: CANCELLED
Start: 2020-01-01

## 2020-01-01 RX ORDER — SODIUM CHLORIDE 9 MG/ML
10 INJECTION INTRAMUSCULAR; INTRAVENOUS; SUBCUTANEOUS AS NEEDED
Status: DISPENSED | OUTPATIENT
Start: 2020-01-01 | End: 2020-01-01

## 2020-01-01 RX ORDER — HEPARIN 100 UNIT/ML
300-500 SYRINGE INTRAVENOUS AS NEEDED
Status: CANCELLED
Start: 2020-01-01

## 2020-01-01 RX ORDER — SODIUM CHLORIDE 9 MG/ML
25 INJECTION, SOLUTION INTRAVENOUS CONTINUOUS
Status: CANCELLED | OUTPATIENT
Start: 2021-01-01

## 2020-01-01 RX ORDER — LIDOCAINE AND PRILOCAINE 25; 25 MG/G; MG/G
CREAM TOPICAL
Qty: 30 G | Refills: 3 | Status: SHIPPED | OUTPATIENT
Start: 2020-01-01

## 2020-01-01 RX ORDER — DEXLANSOPRAZOLE 60 MG/1
CAPSULE, DELAYED RELEASE ORAL
Qty: 90 CAP | Refills: 3 | OUTPATIENT
Start: 2020-01-01

## 2020-01-01 RX ORDER — HYDROCORTISONE SODIUM SUCCINATE 100 MG/2ML
100 INJECTION, POWDER, FOR SOLUTION INTRAMUSCULAR; INTRAVENOUS AS NEEDED
Status: CANCELLED | OUTPATIENT
Start: 2020-01-01

## 2020-01-01 RX ORDER — HEPARIN 100 UNIT/ML
300-500 SYRINGE INTRAVENOUS AS NEEDED
Status: CANCELLED | OUTPATIENT
Start: 2020-01-01

## 2020-01-01 RX ORDER — SODIUM CHLORIDE 9 MG/ML
10 INJECTION INTRAMUSCULAR; INTRAVENOUS; SUBCUTANEOUS AS NEEDED
Status: DISCONTINUED | OUTPATIENT
Start: 2020-01-01 | End: 2020-01-01 | Stop reason: HOSPADM

## 2020-01-01 RX ORDER — DIPHENHYDRAMINE HYDROCHLORIDE 50 MG/ML
50 INJECTION, SOLUTION INTRAMUSCULAR; INTRAVENOUS ONCE
Status: COMPLETED | OUTPATIENT
Start: 2020-01-01 | End: 2020-01-01

## 2020-01-01 RX ORDER — DIPHENHYDRAMINE HYDROCHLORIDE 50 MG/ML
50 INJECTION, SOLUTION INTRAMUSCULAR; INTRAVENOUS ONCE
Status: CANCELLED | OUTPATIENT
Start: 2021-01-01 | End: 2021-01-01

## 2020-01-01 RX ORDER — PALONOSETRON 0.05 MG/ML
0.25 INJECTION, SOLUTION INTRAVENOUS ONCE
Status: CANCELLED | OUTPATIENT
Start: 2021-01-01 | End: 2021-01-01

## 2020-01-01 RX ORDER — HYDROCORTISONE SODIUM SUCCINATE 100 MG/2ML
100 INJECTION, POWDER, FOR SOLUTION INTRAMUSCULAR; INTRAVENOUS AS NEEDED
Status: CANCELLED | OUTPATIENT
Start: 2021-01-01

## 2020-01-01 RX ORDER — DEXAMETHASONE 4 MG/1
TABLET ORAL
Qty: 36 TAB | Refills: 2 | Status: SHIPPED | OUTPATIENT
Start: 2020-01-01 | End: 2021-01-01

## 2020-01-01 RX ORDER — SODIUM CHLORIDE 9 MG/ML
10 INJECTION INTRAMUSCULAR; INTRAVENOUS; SUBCUTANEOUS AS NEEDED
Status: CANCELLED | OUTPATIENT
Start: 2021-01-01

## 2020-01-01 RX ORDER — PREDNISONE 50 MG/1
TABLET ORAL
Qty: 3 TAB | Refills: 0 | Status: SHIPPED | OUTPATIENT
Start: 2020-01-01 | End: 2021-01-01 | Stop reason: SDUPTHER

## 2020-01-01 RX ORDER — SODIUM CHLORIDE 0.9 % (FLUSH) 0.9 %
5-10 SYRINGE (ML) INJECTION AS NEEDED
Status: DISCONTINUED | OUTPATIENT
Start: 2020-01-01 | End: 2020-01-01 | Stop reason: HOSPADM

## 2020-01-01 RX ORDER — ACETAMINOPHEN 325 MG/1
650 TABLET ORAL AS NEEDED
Status: CANCELLED
Start: 2020-01-01

## 2020-01-01 RX ORDER — PALONOSETRON 0.05 MG/ML
0.25 INJECTION, SOLUTION INTRAVENOUS ONCE
Status: CANCELLED | OUTPATIENT
Start: 2020-01-01

## 2020-01-01 RX ORDER — ROSUVASTATIN CALCIUM 10 MG/1
10 TABLET, COATED ORAL
COMMUNITY

## 2020-01-01 RX ORDER — HEPARIN 100 UNIT/ML
500 SYRINGE INTRAVENOUS AS NEEDED
Status: DISCONTINUED | OUTPATIENT
Start: 2020-01-01 | End: 2020-01-01 | Stop reason: HOSPADM

## 2020-01-01 RX ORDER — OMEGA-3-ACID ETHYL ESTERS 1 G/1
CAPSULE, LIQUID FILLED ORAL
Qty: 180 CAP | Refills: 2 | Status: SHIPPED | OUTPATIENT
Start: 2020-01-01

## 2020-01-01 RX ORDER — ACETAMINOPHEN 325 MG/1
650 TABLET ORAL AS NEEDED
Status: CANCELLED
Start: 2021-01-01

## 2020-01-01 RX ORDER — DIPHENHYDRAMINE HCL 25 MG
25 CAPSULE ORAL ONCE
Status: COMPLETED | OUTPATIENT
Start: 2020-01-01 | End: 2020-01-01

## 2020-01-01 RX ORDER — AMOXICILLIN 500 MG/1
CAPSULE ORAL
COMMUNITY
Start: 2020-01-01 | End: 2020-01-01

## 2020-01-01 RX ORDER — AMOXICILLIN 500 MG/1
500 CAPSULE ORAL 3 TIMES DAILY
Qty: 21 CAP | Refills: 0 | Status: SHIPPED | OUTPATIENT
Start: 2020-01-01 | End: 2020-01-01

## 2020-01-01 RX ORDER — DOXAZOSIN 1 MG/1
TABLET ORAL DAILY
Status: ON HOLD | COMMUNITY
End: 2021-01-01

## 2020-01-01 RX ORDER — SODIUM CHLORIDE 0.9 % (FLUSH) 0.9 %
10 SYRINGE (ML) INJECTION AS NEEDED
Status: CANCELLED | OUTPATIENT
Start: 2020-01-01

## 2020-01-01 RX ORDER — DIPHENHYDRAMINE HYDROCHLORIDE 50 MG/ML
25 INJECTION, SOLUTION INTRAMUSCULAR; INTRAVENOUS AS NEEDED
Status: CANCELLED
Start: 2021-01-01

## 2020-01-01 RX ORDER — TRAMADOL HYDROCHLORIDE 50 MG/1
50 TABLET ORAL
Qty: 30 TAB | Refills: 0 | Status: SHIPPED | OUTPATIENT
Start: 2020-01-01 | End: 2021-01-01

## 2020-01-01 RX ORDER — DIPHENHYDRAMINE HYDROCHLORIDE 50 MG/ML
50 INJECTION, SOLUTION INTRAMUSCULAR; INTRAVENOUS ONCE
Status: CANCELLED | OUTPATIENT
Start: 2020-01-01

## 2020-01-01 RX ORDER — DIPHENHYDRAMINE HYDROCHLORIDE 50 MG/ML
50 INJECTION, SOLUTION INTRAMUSCULAR; INTRAVENOUS AS NEEDED
Status: CANCELLED
Start: 2021-01-01

## 2020-01-01 RX ORDER — SODIUM CHLORIDE 9 MG/ML
250 INJECTION, SOLUTION INTRAVENOUS AS NEEDED
Status: DISPENSED | OUTPATIENT
Start: 2020-01-01 | End: 2020-01-01

## 2020-01-01 RX ORDER — ACETAMINOPHEN 325 MG/1
650 TABLET ORAL ONCE
Status: COMPLETED | OUTPATIENT
Start: 2020-01-01 | End: 2020-01-01

## 2020-01-01 RX ORDER — AMOXICILLIN AND CLAVULANATE POTASSIUM 875; 125 MG/1; MG/1
TABLET, FILM COATED ORAL
COMMUNITY
Start: 2020-01-01 | End: 2020-01-01 | Stop reason: ALTCHOICE

## 2020-01-01 RX ORDER — SODIUM CHLORIDE 9 MG/ML
250 INJECTION, SOLUTION INTRAVENOUS AS NEEDED
Status: DISCONTINUED | OUTPATIENT
Start: 2020-01-01 | End: 2020-01-01

## 2020-01-01 RX ORDER — ONDANSETRON 4 MG/1
4 TABLET, ORALLY DISINTEGRATING ORAL
Qty: 30 TAB | Refills: 2 | Status: SHIPPED | OUTPATIENT
Start: 2020-01-01

## 2020-01-01 RX ORDER — ONDANSETRON 2 MG/ML
8 INJECTION INTRAMUSCULAR; INTRAVENOUS AS NEEDED
Status: CANCELLED | OUTPATIENT
Start: 2021-01-01

## 2020-01-01 RX ORDER — TRAMADOL HYDROCHLORIDE 50 MG/1
50 TABLET ORAL
COMMUNITY
End: 2020-01-01 | Stop reason: SDUPTHER

## 2020-01-01 RX ORDER — SODIUM CHLORIDE 0.9 % (FLUSH) 0.9 %
10 SYRINGE (ML) INJECTION
Status: COMPLETED | OUTPATIENT
Start: 2020-01-01 | End: 2020-01-01

## 2020-01-01 RX ORDER — HYDROCODONE BITARTRATE AND ACETAMINOPHEN 5; 325 MG/1; MG/1
1 TABLET ORAL
Qty: 30 TAB | Refills: 0 | Status: SHIPPED | OUTPATIENT
Start: 2020-01-01 | End: 2020-01-01

## 2020-01-01 RX ORDER — DIPHENHYDRAMINE HYDROCHLORIDE 50 MG/ML
50 INJECTION, SOLUTION INTRAMUSCULAR; INTRAVENOUS ONCE
Status: CANCELLED | OUTPATIENT
Start: 2020-01-01 | End: 2020-01-01

## 2020-01-01 RX ORDER — DOXAZOSIN 1 MG/1
1 TABLET ORAL DAILY
COMMUNITY

## 2020-01-01 RX ORDER — ALBUTEROL SULFATE 0.83 MG/ML
2.5 SOLUTION RESPIRATORY (INHALATION) AS NEEDED
Status: CANCELLED
Start: 2021-01-01

## 2020-01-01 RX ORDER — NIRAPARIB 100 MG/1
100 CAPSULE ORAL DAILY
Qty: 60 CAP | Refills: 4 | Status: SHIPPED | OUTPATIENT
Start: 2020-01-01 | End: 2020-01-01

## 2020-01-01 RX ADMIN — PALONOSETRON 0.25 MG: 0.05 INJECTION, SOLUTION INTRAVENOUS at 12:08

## 2020-01-01 RX ADMIN — Medication 500 UNITS: at 13:35

## 2020-01-01 RX ADMIN — SODIUM CHLORIDE 25 ML/HR: 900 INJECTION, SOLUTION INTRAVENOUS at 09:37

## 2020-01-01 RX ADMIN — DEXAMETHASONE SODIUM PHOSPHATE 12 MG: 4 INJECTION, SOLUTION INTRA-ARTICULAR; INTRALESIONAL; INTRAMUSCULAR; INTRAVENOUS; SOFT TISSUE at 10:19

## 2020-01-01 RX ADMIN — SODIUM CHLORIDE 10 ML: 9 INJECTION INTRAMUSCULAR; INTRAVENOUS; SUBCUTANEOUS at 08:25

## 2020-01-01 RX ADMIN — PALONOSETRON 0.25 MG: 0.05 INJECTION, SOLUTION INTRAVENOUS at 10:49

## 2020-01-01 RX ADMIN — SODIUM CHLORIDE 10 ML: 9 INJECTION INTRAMUSCULAR; INTRAVENOUS; SUBCUTANEOUS at 08:16

## 2020-01-01 RX ADMIN — SODIUM CHLORIDE 10 ML: 9 INJECTION INTRAMUSCULAR; INTRAVENOUS; SUBCUTANEOUS at 16:05

## 2020-01-01 RX ADMIN — PALONOSETRON 0.25 MG: 0.05 INJECTION, SOLUTION INTRAVENOUS at 09:39

## 2020-01-01 RX ADMIN — DIPHENHYDRAMINE HYDROCHLORIDE 50 MG: 50 INJECTION INTRAMUSCULAR; INTRAVENOUS at 09:48

## 2020-01-01 RX ADMIN — DIPHENHYDRAMINE HYDROCHLORIDE 50 MG: 50 INJECTION INTRAMUSCULAR; INTRAVENOUS at 09:55

## 2020-01-01 RX ADMIN — Medication 10 ML: at 16:05

## 2020-01-01 RX ADMIN — PALONOSETRON 0.25 MG: 0.05 INJECTION, SOLUTION INTRAVENOUS at 09:50

## 2020-01-01 RX ADMIN — HEPARIN 500 UNITS: 100 SYRINGE at 09:53

## 2020-01-01 RX ADMIN — HEPARIN 500 UNITS: 100 SYRINGE at 16:00

## 2020-01-01 RX ADMIN — DIPHENHYDRAMINE HYDROCHLORIDE 50 MG: 50 INJECTION INTRAMUSCULAR; INTRAVENOUS at 10:16

## 2020-01-01 RX ADMIN — FAMOTIDINE 20 MG: 10 INJECTION, SOLUTION INTRAVENOUS at 09:49

## 2020-01-01 RX ADMIN — Medication 10 ML: at 16:35

## 2020-01-01 RX ADMIN — PACLITAXEL 197 MG: 6 INJECTION, SOLUTION INTRAVENOUS at 11:18

## 2020-01-01 RX ADMIN — HEPARIN 500 UNITS: 100 SYRINGE at 16:13

## 2020-01-01 RX ADMIN — SODIUM CHLORIDE 25 ML/HR: 900 INJECTION, SOLUTION INTRAVENOUS at 10:21

## 2020-01-01 RX ADMIN — PACLITAXEL 197 MG: 6 INJECTION, SOLUTION, CONCENTRATE INTRAVENOUS at 10:16

## 2020-01-01 RX ADMIN — PACLITAXEL 197 MG: 6 INJECTION, SOLUTION INTRAVENOUS at 10:53

## 2020-01-01 RX ADMIN — FAMOTIDINE 20 MG: 10 INJECTION, SOLUTION INTRAVENOUS at 09:40

## 2020-01-01 RX ADMIN — PACLITAXEL 197 MG: 6 INJECTION, SOLUTION INTRAVENOUS at 11:55

## 2020-01-01 RX ADMIN — DEXAMETHASONE SODIUM PHOSPHATE 12 MG: 4 INJECTION, SOLUTION INTRAMUSCULAR; INTRAVENOUS at 10:13

## 2020-01-01 RX ADMIN — HEPARIN 500 UNITS: 100 SYRINGE at 12:06

## 2020-01-01 RX ADMIN — FAMOTIDINE 20 MG: 10 INJECTION, SOLUTION INTRAVENOUS at 10:25

## 2020-01-01 RX ADMIN — SODIUM CHLORIDE 10 ML: 9 INJECTION INTRAMUSCULAR; INTRAVENOUS; SUBCUTANEOUS at 16:00

## 2020-01-01 RX ADMIN — SODIUM CHLORIDE 25 ML/HR: 900 INJECTION, SOLUTION INTRAVENOUS at 09:27

## 2020-01-01 RX ADMIN — PALONOSETRON 0.25 MG: 0.25 INJECTION, SOLUTION INTRAVENOUS at 09:29

## 2020-01-01 RX ADMIN — Medication 10 ML: at 08:18

## 2020-01-01 RX ADMIN — DEXAMETHASONE SODIUM PHOSPHATE 12 MG: 4 INJECTION, SOLUTION INTRA-ARTICULAR; INTRALESIONAL; INTRAMUSCULAR; INTRAVENOUS; SOFT TISSUE at 09:51

## 2020-01-01 RX ADMIN — SODIUM CHLORIDE 10 ML: 9 INJECTION INTRAMUSCULAR; INTRAVENOUS; SUBCUTANEOUS at 12:35

## 2020-01-01 RX ADMIN — Medication 10 ML: at 09:28

## 2020-01-01 RX ADMIN — SODIUM CHLORIDE 25 ML/HR: 900 INJECTION, SOLUTION INTRAVENOUS at 09:53

## 2020-01-01 RX ADMIN — DIPHENHYDRAMINE HYDROCHLORIDE 50 MG: 50 INJECTION INTRAMUSCULAR; INTRAVENOUS at 10:53

## 2020-01-01 RX ADMIN — PALONOSETRON 0.25 MG: 0.05 INJECTION, SOLUTION INTRAVENOUS at 09:55

## 2020-01-01 RX ADMIN — DEXAMETHASONE SODIUM PHOSPHATE 12 MG: 4 INJECTION, SOLUTION INTRA-ARTICULAR; INTRALESIONAL; INTRAMUSCULAR; INTRAVENOUS; SOFT TISSUE at 10:30

## 2020-01-01 RX ADMIN — IOHEXOL 50 ML: 240 INJECTION, SOLUTION INTRATHECAL; INTRAVASCULAR; INTRAVENOUS; ORAL at 09:27

## 2020-01-01 RX ADMIN — Medication 10 ML: at 12:23

## 2020-01-01 RX ADMIN — SODIUM CHLORIDE 250 ML: 900 INJECTION, SOLUTION INTRAVENOUS at 12:40

## 2020-01-01 RX ADMIN — DEXAMETHASONE SODIUM PHOSPHATE 12 MG: 4 INJECTION, SOLUTION INTRA-ARTICULAR; INTRALESIONAL; INTRAMUSCULAR; INTRAVENOUS; SOFT TISSUE at 11:08

## 2020-01-01 RX ADMIN — PACLITAXEL 197 MG: 6 INJECTION, SOLUTION INTRAVENOUS at 11:05

## 2020-01-01 RX ADMIN — FAMOTIDINE 20 MG: 10 INJECTION INTRAVENOUS at 09:30

## 2020-01-01 RX ADMIN — SODIUM CHLORIDE 10 ML: 9 INJECTION INTRAMUSCULAR; INTRAVENOUS; SUBCUTANEOUS at 09:53

## 2020-01-01 RX ADMIN — FAMOTIDINE 20 MG: 10 INJECTION INTRAVENOUS at 10:41

## 2020-01-01 RX ADMIN — PACLITAXEL 197 MG: 6 INJECTION, SOLUTION INTRAVENOUS at 10:34

## 2020-01-01 RX ADMIN — Medication 10 ML: at 09:53

## 2020-01-01 RX ADMIN — DIPHENHYDRAMINE HYDROCHLORIDE 50 MG: 50 INJECTION INTRAMUSCULAR; INTRAVENOUS at 09:56

## 2020-01-01 RX ADMIN — DIPHENHYDRAMINE HYDROCHLORIDE 50 MG: 50 INJECTION INTRAMUSCULAR; INTRAVENOUS at 10:52

## 2020-01-01 RX ADMIN — DEXAMETHASONE SODIUM PHOSPHATE 12 MG: 4 INJECTION, SOLUTION INTRA-ARTICULAR; INTRALESIONAL; INTRAMUSCULAR; INTRAVENOUS; SOFT TISSUE at 12:46

## 2020-01-01 RX ADMIN — Medication 10 ML: at 08:16

## 2020-01-01 RX ADMIN — SODIUM CHLORIDE 25 ML/HR: 900 INJECTION, SOLUTION INTRAVENOUS at 10:11

## 2020-01-01 RX ADMIN — PALONOSETRON 0.25 MG: 0.05 INJECTION, SOLUTION INTRAVENOUS at 10:42

## 2020-01-01 RX ADMIN — DEXAMETHASONE SODIUM PHOSPHATE 12 MG: 4 INJECTION, SOLUTION INTRA-ARTICULAR; INTRALESIONAL; INTRAMUSCULAR; INTRAVENOUS; SOFT TISSUE at 10:08

## 2020-01-01 RX ADMIN — DEXAMETHASONE SODIUM PHOSPHATE 12 MG: 4 INJECTION, SOLUTION INTRA-ARTICULAR; INTRALESIONAL; INTRAMUSCULAR; INTRAVENOUS; SOFT TISSUE at 10:20

## 2020-01-01 RX ADMIN — PACLITAXEL 197 MG: 6 INJECTION, SOLUTION INTRAVENOUS at 10:59

## 2020-01-01 RX ADMIN — PALONOSETRON 0.25 MG: 0.05 INJECTION, SOLUTION INTRAVENOUS at 11:26

## 2020-01-01 RX ADMIN — DIPHENHYDRAMINE HYDROCHLORIDE 50 MG: 50 INJECTION INTRAMUSCULAR; INTRAVENOUS at 09:52

## 2020-01-01 RX ADMIN — FAMOTIDINE 20 MG: 10 INJECTION INTRAVENOUS at 11:30

## 2020-01-01 RX ADMIN — DIPHENHYDRAMINE HYDROCHLORIDE 50 MG: 50 INJECTION INTRAMUSCULAR; INTRAVENOUS at 09:46

## 2020-01-01 RX ADMIN — HEPARIN 500 UNITS: 100 SYRINGE at 16:35

## 2020-01-01 RX ADMIN — Medication 10 ML: at 11:41

## 2020-01-01 RX ADMIN — SODIUM CHLORIDE 20 MG: 9 INJECTION INTRAMUSCULAR; INTRAVENOUS; SUBCUTANEOUS at 12:06

## 2020-01-01 RX ADMIN — PALONOSETRON 0.25 MG: 0.25 INJECTION, SOLUTION INTRAVENOUS at 09:50

## 2020-01-01 RX ADMIN — PACLITAXEL 197 MG: 6 INJECTION, SOLUTION INTRAVENOUS at 11:06

## 2020-01-01 RX ADMIN — PALONOSETRON 0.25 MG: 0.05 INJECTION, SOLUTION INTRAVENOUS at 09:45

## 2020-01-01 RX ADMIN — PACLITAXEL 197 MG: 6 INJECTION, SOLUTION INTRAVENOUS at 12:03

## 2020-01-01 RX ADMIN — PACLITAXEL 197 MG: 6 INJECTION, SOLUTION INTRAVENOUS at 11:21

## 2020-01-01 RX ADMIN — PALONOSETRON 0.25 MG: 0.05 INJECTION, SOLUTION INTRAVENOUS at 10:13

## 2020-01-01 RX ADMIN — DEXAMETHASONE SODIUM PHOSPHATE 12 MG: 4 INJECTION, SOLUTION INTRA-ARTICULAR; INTRALESIONAL; INTRAMUSCULAR; INTRAVENOUS; SOFT TISSUE at 11:12

## 2020-01-01 RX ADMIN — PACLITAXEL 197 MG: 6 INJECTION, SOLUTION INTRAVENOUS at 10:45

## 2020-01-01 RX ADMIN — PACLITAXEL 197 MG: 6 INJECTION, SOLUTION INTRAVENOUS at 12:59

## 2020-01-01 RX ADMIN — DIPHENHYDRAMINE HYDROCHLORIDE 50 MG: 50 INJECTION INTRAMUSCULAR; INTRAVENOUS at 12:10

## 2020-01-01 RX ADMIN — DEXAMETHASONE SODIUM PHOSPHATE 12 MG: 4 INJECTION, SOLUTION INTRA-ARTICULAR; INTRALESIONAL; INTRAMUSCULAR; INTRAVENOUS; SOFT TISSUE at 10:56

## 2020-01-01 RX ADMIN — DIPHENHYDRAMINE HYDROCHLORIDE 50 MG: 50 INJECTION INTRAMUSCULAR; INTRAVENOUS at 10:44

## 2020-01-01 RX ADMIN — PALONOSETRON 0.25 MG: 0.05 INJECTION, SOLUTION INTRAVENOUS at 10:54

## 2020-01-01 RX ADMIN — IOPAMIDOL 100 ML: 755 INJECTION, SOLUTION INTRAVENOUS at 09:27

## 2020-01-01 RX ADMIN — SODIUM CHLORIDE 25 ML/HR: 900 INJECTION, SOLUTION INTRAVENOUS at 09:04

## 2020-01-01 RX ADMIN — HEPARIN 500 UNITS: 100 SYRINGE at 11:41

## 2020-01-01 RX ADMIN — SODIUM CHLORIDE 25 ML/HR: 900 INJECTION, SOLUTION INTRAVENOUS at 11:26

## 2020-01-01 RX ADMIN — DEXAMETHASONE SODIUM PHOSPHATE 12 MG: 4 INJECTION, SOLUTION INTRAMUSCULAR; INTRAVENOUS at 11:19

## 2020-01-01 RX ADMIN — SODIUM CHLORIDE 25 ML/HR: 900 INJECTION, SOLUTION INTRAVENOUS at 12:08

## 2020-01-01 RX ADMIN — PALONOSETRON HYDROCHLORIDE 0.25 MG: 0.25 INJECTION INTRAVENOUS at 11:11

## 2020-01-01 RX ADMIN — Medication 10 ML: at 08:24

## 2020-01-01 RX ADMIN — DIPHENHYDRAMINE HYDROCHLORIDE 50 MG: 50 INJECTION INTRAMUSCULAR; INTRAVENOUS at 09:40

## 2020-01-01 RX ADMIN — DIPHENHYDRAMINE HYDROCHLORIDE 50 MG: 50 INJECTION INTRAMUSCULAR; INTRAVENOUS at 09:16

## 2020-01-01 RX ADMIN — DEXAMETHASONE SODIUM PHOSPHATE 12 MG: 4 INJECTION, SOLUTION INTRA-ARTICULAR; INTRALESIONAL; INTRAMUSCULAR; INTRAVENOUS; SOFT TISSUE at 09:17

## 2020-01-01 RX ADMIN — DEXAMETHASONE SODIUM PHOSPHATE 12 MG: 4 INJECTION, SOLUTION INTRA-ARTICULAR; INTRALESIONAL; INTRAMUSCULAR; INTRAVENOUS; SOFT TISSUE at 10:11

## 2020-01-01 RX ADMIN — FAMOTIDINE 20 MG: 10 INJECTION INTRAVENOUS at 09:56

## 2020-01-01 RX ADMIN — SODIUM CHLORIDE 250 ML: 9 INJECTION, SOLUTION INTRAVENOUS at 13:38

## 2020-01-01 RX ADMIN — SODIUM CHLORIDE 25 ML/HR: 900 INJECTION, SOLUTION INTRAVENOUS at 09:45

## 2020-01-01 RX ADMIN — Medication 10 ML: at 16:38

## 2020-01-01 RX ADMIN — PALONOSETRON 0.25 MG: 0.05 INJECTION, SOLUTION INTRAVENOUS at 09:37

## 2020-01-01 RX ADMIN — FAMOTIDINE 20 MG: 10 INJECTION INTRAVENOUS at 09:36

## 2020-01-01 RX ADMIN — FAMOTIDINE 20 MG: 10 INJECTION INTRAVENOUS at 09:13

## 2020-01-01 RX ADMIN — Medication 10 ML: at 16:00

## 2020-01-01 RX ADMIN — PACLITAXEL 197 MG: 6 INJECTION, SOLUTION INTRAVENOUS at 11:53

## 2020-01-01 RX ADMIN — DIPHENHYDRAMINE HYDROCHLORIDE 25 MG: 25 CAPSULE ORAL at 14:32

## 2020-01-01 RX ADMIN — DIPHENHYDRAMINE HYDROCHLORIDE 50 MG: 50 INJECTION INTRAMUSCULAR; INTRAVENOUS at 11:09

## 2020-01-01 RX ADMIN — FAMOTIDINE 20 MG: 10 INJECTION INTRAVENOUS at 10:51

## 2020-01-01 RX ADMIN — FAMOTIDINE 20 MG: 10 INJECTION INTRAVENOUS at 10:50

## 2020-01-01 RX ADMIN — Medication 10 ML: at 17:37

## 2020-01-01 RX ADMIN — SODIUM CHLORIDE 10 ML: 9 INJECTION INTRAMUSCULAR; INTRAVENOUS; SUBCUTANEOUS at 16:14

## 2020-01-01 RX ADMIN — PACLITAXEL 197 MG: 6 INJECTION, SOLUTION INTRAVENOUS at 09:52

## 2020-01-01 RX ADMIN — DEXAMETHASONE SODIUM PHOSPHATE 12 MG: 4 INJECTION, SOLUTION INTRA-ARTICULAR; INTRALESIONAL; INTRAMUSCULAR; INTRAVENOUS; SOFT TISSUE at 09:56

## 2020-01-01 RX ADMIN — HEPARIN 500 UNITS: 100 SYRINGE at 12:29

## 2020-01-01 RX ADMIN — DIPHENHYDRAMINE HYDROCHLORIDE 50 MG: 50 INJECTION INTRAMUSCULAR; INTRAVENOUS at 11:32

## 2020-01-01 RX ADMIN — SODIUM CHLORIDE 500 ML: 900 INJECTION, SOLUTION INTRAVENOUS at 12:00

## 2020-01-01 RX ADMIN — Medication 10 ML: at 09:47

## 2020-01-01 RX ADMIN — ACETAMINOPHEN 650 MG: 325 TABLET ORAL at 14:32

## 2020-01-01 RX ADMIN — FAMOTIDINE 20 MG: 10 INJECTION INTRAVENOUS at 09:41

## 2020-01-01 RX ADMIN — PALONOSETRON 0.25 MG: 0.05 INJECTION, SOLUTION INTRAVENOUS at 10:20

## 2020-01-01 RX ADMIN — Medication 10 ML: at 13:34

## 2020-01-01 RX ADMIN — SODIUM CHLORIDE 25 ML/HR: 900 INJECTION, SOLUTION INTRAVENOUS at 10:37

## 2020-01-01 RX ADMIN — Medication 10 ML: at 12:05

## 2020-01-01 RX ADMIN — PACLITAXEL 197 MG: 6 INJECTION, SOLUTION INTRAVENOUS at 11:49

## 2020-01-01 RX ADMIN — DEXAMETHASONE SODIUM PHOSPHATE 12 MG: 4 INJECTION, SOLUTION INTRA-ARTICULAR; INTRALESIONAL; INTRAMUSCULAR; INTRAVENOUS; SOFT TISSUE at 09:49

## 2020-01-01 RX ADMIN — SODIUM CHLORIDE 25 ML/HR: 900 INJECTION, SOLUTION INTRAVENOUS at 11:07

## 2020-01-01 RX ADMIN — FAMOTIDINE 20 MG: 10 INJECTION INTRAVENOUS at 10:14

## 2020-01-01 RX ADMIN — SODIUM CHLORIDE 100 ML: 900 INJECTION, SOLUTION INTRAVENOUS at 09:27

## 2020-01-01 RX ADMIN — DIPHENHYDRAMINE HYDROCHLORIDE 50 MG: 50 INJECTION INTRAMUSCULAR; INTRAVENOUS at 10:23

## 2020-01-01 RX ADMIN — Medication 10 ML: at 12:29

## 2020-01-01 RX ADMIN — HEPARIN 500 UNITS: 100 SYRINGE at 12:35

## 2020-01-01 RX ADMIN — HEPARIN 500 UNITS: 100 SYRINGE at 17:37

## 2020-01-01 RX ADMIN — HEPARIN 500 UNITS: 100 SYRINGE at 10:30

## 2020-01-01 RX ADMIN — SODIUM CHLORIDE 25 ML/HR: 900 INJECTION, SOLUTION INTRAVENOUS at 09:55

## 2020-01-01 RX ADMIN — PACLITAXEL 197 MG: 6 INJECTION, SOLUTION INTRAVENOUS at 10:48

## 2020-01-01 RX ADMIN — SODIUM CHLORIDE 10 ML: 9 INJECTION INTRAMUSCULAR; INTRAVENOUS; SUBCUTANEOUS at 10:31

## 2020-01-01 RX ADMIN — SODIUM CHLORIDE 25 ML/HR: 900 INJECTION, SOLUTION INTRAVENOUS at 10:51

## 2020-01-01 RX ADMIN — DIPHENHYDRAMINE HYDROCHLORIDE 50 MG: 50 INJECTION INTRAMUSCULAR; INTRAVENOUS at 09:28

## 2020-01-01 RX ADMIN — HEPARIN 500 UNITS: 100 SYRINGE at 12:23

## 2020-01-01 RX ADMIN — PALONOSETRON 0.25 MG: 0.05 INJECTION, SOLUTION INTRAVENOUS at 09:15

## 2020-01-01 RX ADMIN — SODIUM CHLORIDE 25 ML/HR: 900 INJECTION, SOLUTION INTRAVENOUS at 10:30

## 2020-01-01 RX ADMIN — DEXAMETHASONE SODIUM PHOSPHATE 12 MG: 4 INJECTION, SOLUTION INTRA-ARTICULAR; INTRALESIONAL; INTRAMUSCULAR; INTRAVENOUS; SOFT TISSUE at 11:34

## 2020-01-01 RX ADMIN — SODIUM CHLORIDE 10 ML: 9 INJECTION INTRAMUSCULAR; INTRAVENOUS; SUBCUTANEOUS at 09:46

## 2020-01-01 RX ADMIN — SODIUM CHLORIDE 10 ML: 9 INJECTION INTRAMUSCULAR; INTRAVENOUS; SUBCUTANEOUS at 11:41

## 2020-01-01 RX ADMIN — HEPARIN 500 UNITS: 100 SYRINGE at 16:05

## 2020-01-01 RX ADMIN — SODIUM CHLORIDE 25 ML/HR: 900 INJECTION, SOLUTION INTRAVENOUS at 09:51

## 2020-01-01 RX ADMIN — FAMOTIDINE 20 MG: 10 INJECTION INTRAVENOUS at 11:08

## 2020-01-01 RX ADMIN — Medication 10 ML: at 16:13

## 2020-01-01 RX ADMIN — FAMOTIDINE 20 MG: 10 INJECTION INTRAVENOUS at 09:49

## 2020-01-09 LAB
BASOPHILS # BLD AUTO: 0 X10E3/UL (ref 0–0.2)
BASOPHILS NFR BLD AUTO: 1 %
BUN SERPL-MCNC: 28 MG/DL (ref 6–24)
BUN/CREAT SERPL: 18 (ref 9–23)
CALCIUM SERPL-MCNC: 9.3 MG/DL (ref 8.7–10.2)
CANCER AG125 SERPL-ACNC: 268 U/ML (ref 0–38.1)
CHLORIDE SERPL-SCNC: 108 MMOL/L (ref 96–106)
CO2 SERPL-SCNC: 18 MMOL/L (ref 20–29)
CREAT SERPL-MCNC: 1.54 MG/DL (ref 0.57–1)
EOSINOPHIL # BLD AUTO: 0.4 X10E3/UL (ref 0–0.4)
EOSINOPHIL NFR BLD AUTO: 7 %
ERYTHROCYTE [DISTWIDTH] IN BLOOD BY AUTOMATED COUNT: 13.5 % (ref 11.7–15.4)
GLUCOSE SERPL-MCNC: 115 MG/DL (ref 65–99)
HCT VFR BLD AUTO: 25.6 % (ref 34–46.6)
HGB BLD-MCNC: 7.9 G/DL (ref 11.1–15.9)
IMM GRANULOCYTES # BLD AUTO: 0 X10E3/UL (ref 0–0.1)
IMM GRANULOCYTES NFR BLD AUTO: 0 %
LYMPHOCYTES # BLD AUTO: 1.1 X10E3/UL (ref 0.7–3.1)
LYMPHOCYTES NFR BLD AUTO: 19 %
MCH RBC QN AUTO: 28 PG (ref 26.6–33)
MCHC RBC AUTO-ENTMCNC: 30.9 G/DL (ref 31.5–35.7)
MCV RBC AUTO: 91 FL (ref 79–97)
MONOCYTES # BLD AUTO: 0.8 X10E3/UL (ref 0.1–0.9)
MONOCYTES NFR BLD AUTO: 14 %
NEUTROPHILS # BLD AUTO: 3.4 X10E3/UL (ref 1.4–7)
NEUTROPHILS NFR BLD AUTO: 59 %
PLATELET # BLD AUTO: 177 X10E3/UL (ref 150–450)
POTASSIUM SERPL-SCNC: 5.6 MMOL/L (ref 3.5–5.2)
RBC # BLD AUTO: 2.82 X10E6/UL (ref 3.77–5.28)
SODIUM SERPL-SCNC: 141 MMOL/L (ref 134–144)
WBC # BLD AUTO: 5.8 X10E3/UL (ref 3.4–10.8)

## 2020-01-13 ENCOUNTER — TELEPHONE (OUTPATIENT)
Dept: GYNECOLOGY | Age: 60
End: 2020-01-13

## 2020-01-13 NOTE — TELEPHONE ENCOUNTER
Call placed to pt to review current medications (corrected in CC), informed her of elevated K+, and foods to avoid. She has appt with PCP this week and will have lab results (CMP) faxed to my attention. She is not taking a MV.

## 2020-01-13 NOTE — TELEPHONE ENCOUNTER
----- Message from Shon Santos sent at 1/9/2020 10:07 AM EST -----  Regarding: lab results  Can you please call Db Bermudez. Her potassium is 5.6, elevated. Make sure she is not taking any potassium supplements. I see one in her history. Also, she has two different statins in her chart. Can you verify she is only on one. If she is on two, this should be cleared up. She is also on a -sartan (ARB) blood pressure medication. Is she still taking this. It hasn't been elevated before, could just be an aberrancy. Tell her to avoid raisins, potatoes, bananas. Stop multivitamin for now if she is on one. We should check this again in a week.      Stefano Bautista

## 2020-01-14 ENCOUNTER — TELEPHONE (OUTPATIENT)
Dept: CARDIOLOGY CLINIC | Age: 60
End: 2020-01-14

## 2020-01-14 NOTE — TELEPHONE ENCOUNTER
Called patient to confirm Nuclear stress test for 01/16 Reviewed with patient the need to hold all caffeine containing food, beverages and medicines for 24 hours. Pt verbalized understanding.

## 2020-01-17 RX ORDER — SODIUM CHLORIDE 0.9 % (FLUSH) 0.9 %
5-10 SYRINGE (ML) INJECTION AS NEEDED
Status: CANCELLED | OUTPATIENT
Start: 2020-01-23

## 2020-01-17 RX ORDER — HEPARIN 100 UNIT/ML
500 SYRINGE INTRAVENOUS AS NEEDED
Status: CANCELLED | OUTPATIENT
Start: 2020-01-23

## 2020-01-17 RX ORDER — SODIUM CHLORIDE 9 MG/ML
10 INJECTION INTRAMUSCULAR; INTRAVENOUS; SUBCUTANEOUS AS NEEDED
Status: CANCELLED | OUTPATIENT
Start: 2020-01-23

## 2020-01-20 ENCOUNTER — TELEPHONE (OUTPATIENT)
Dept: CARDIOLOGY CLINIC | Age: 60
End: 2020-01-20

## 2020-01-20 RX ORDER — OMEGA-3-ACID ETHYL ESTERS 1 G/1
CAPSULE, LIQUID FILLED ORAL
Qty: 180 CAP | Refills: 2 | Status: SHIPPED | OUTPATIENT
Start: 2020-01-20 | End: 2020-01-01

## 2020-01-20 NOTE — TELEPHONE ENCOUNTER
Spoke with patient. Verified patient with two patient identifiers. Advised EST normal.  Patient verbalized understanding.     Result Notes for NUCLEAR CARDIAC STRESS TEST     Notes recorded by Lisa Gaucher, NP on 1/17/2020 at 5:01 PM EST  Please call patient, stress test normal.

## 2020-01-21 ENCOUNTER — TELEPHONE (OUTPATIENT)
Dept: CARDIOLOGY CLINIC | Age: 60
End: 2020-01-21

## 2020-01-21 NOTE — TELEPHONE ENCOUNTER
----- Message from Mavis Paz NP sent at 1/20/2020  4:31 PM EST -----  Echocardiogram shows normal pumping strength, trace amount of mitral valve regurgitation otherwise essentially normal echocardiogram.

## 2020-01-23 ENCOUNTER — HOSPITAL ENCOUNTER (OUTPATIENT)
Dept: INFUSION THERAPY | Age: 60
Discharge: HOME | End: 2020-01-23
Payer: MEDICARE

## 2020-01-23 VITALS
HEART RATE: 97 BPM | TEMPERATURE: 98.2 F | RESPIRATION RATE: 20 BRPM | SYSTOLIC BLOOD PRESSURE: 148 MMHG | DIASTOLIC BLOOD PRESSURE: 86 MMHG

## 2020-01-23 DIAGNOSIS — C56.9 MALIGNANT NEOPLASM OF OVARY, UNSPECIFIED LATERALITY (HCC): Primary | ICD-10-CM

## 2020-01-23 LAB
ALBUMIN SERPL-MCNC: 3.4 G/DL (ref 3.5–5)
ALBUMIN/GLOB SERPL: 0.7 {RATIO} (ref 1.1–2.2)
ALP SERPL-CCNC: 171 U/L (ref 45–117)
ALT SERPL-CCNC: 54 U/L (ref 12–78)
ANION GAP SERPL CALC-SCNC: 7 MMOL/L (ref 5–15)
AST SERPL-CCNC: 75 U/L (ref 15–37)
BASOPHILS # BLD: 0.1 K/UL (ref 0–0.1)
BASOPHILS NFR BLD: 1 % (ref 0–1)
BILIRUB SERPL-MCNC: 0.1 MG/DL (ref 0.2–1)
BUN SERPL-MCNC: 26 MG/DL (ref 6–20)
BUN/CREAT SERPL: 13 (ref 12–20)
CALCIUM SERPL-MCNC: 8.9 MG/DL (ref 8.5–10.1)
CANCER AG125 SERPL-ACNC: 200 U/ML (ref 1.5–35)
CHLORIDE SERPL-SCNC: 112 MMOL/L (ref 97–108)
CO2 SERPL-SCNC: 21 MMOL/L (ref 21–32)
CREAT SERPL-MCNC: 1.96 MG/DL (ref 0.55–1.02)
DIFFERENTIAL METHOD BLD: ABNORMAL
EOSINOPHIL # BLD: 0.4 K/UL (ref 0–0.4)
EOSINOPHIL NFR BLD: 7 % (ref 0–7)
ERYTHROCYTE [DISTWIDTH] IN BLOOD BY AUTOMATED COUNT: 14 % (ref 11.5–14.5)
GLOBULIN SER CALC-MCNC: 5.2 G/DL (ref 2–4)
GLUCOSE SERPL-MCNC: 125 MG/DL (ref 65–100)
HCT VFR BLD AUTO: 25.8 % (ref 35–47)
HGB BLD-MCNC: 7.8 G/DL (ref 11.5–16)
IMM GRANULOCYTES # BLD AUTO: 0 K/UL (ref 0–0.04)
IMM GRANULOCYTES NFR BLD AUTO: 0 % (ref 0–0.5)
LYMPHOCYTES # BLD: 1.2 K/UL (ref 0.8–3.5)
LYMPHOCYTES NFR BLD: 20 % (ref 12–49)
MCH RBC QN AUTO: 29.8 PG (ref 26–34)
MCHC RBC AUTO-ENTMCNC: 30.2 G/DL (ref 30–36.5)
MCV RBC AUTO: 98.5 FL (ref 80–99)
MONOCYTES # BLD: 0.7 K/UL (ref 0–1)
MONOCYTES NFR BLD: 12 % (ref 5–13)
NEUTS SEG # BLD: 3.5 K/UL (ref 1.8–8)
NEUTS SEG NFR BLD: 60 % (ref 32–75)
NRBC # BLD: 0 K/UL (ref 0–0.01)
NRBC BLD-RTO: 0 PER 100 WBC
PLATELET # BLD AUTO: 175 K/UL (ref 150–400)
PMV BLD AUTO: 9.5 FL (ref 8.9–12.9)
POTASSIUM SERPL-SCNC: 5.4 MMOL/L (ref 3.5–5.1)
PROT SERPL-MCNC: 8.6 G/DL (ref 6.4–8.2)
RBC # BLD AUTO: 2.62 M/UL (ref 3.8–5.2)
SODIUM SERPL-SCNC: 140 MMOL/L (ref 136–145)
WBC # BLD AUTO: 5.9 K/UL (ref 3.6–11)

## 2020-01-23 PROCEDURE — 86304 IMMUNOASSAY TUMOR CA 125: CPT

## 2020-01-23 PROCEDURE — 74011250636 HC RX REV CODE- 250/636: Performed by: OBSTETRICS & GYNECOLOGY

## 2020-01-23 PROCEDURE — 36415 COLL VENOUS BLD VENIPUNCTURE: CPT

## 2020-01-23 PROCEDURE — 36591 DRAW BLOOD OFF VENOUS DEVICE: CPT

## 2020-01-23 PROCEDURE — 85025 COMPLETE CBC W/AUTO DIFF WBC: CPT

## 2020-01-23 PROCEDURE — 77030012965 HC NDL HUBR BBMI -A

## 2020-01-23 PROCEDURE — 80053 COMPREHEN METABOLIC PANEL: CPT

## 2020-01-23 RX ORDER — SODIUM CHLORIDE 9 MG/ML
10 INJECTION INTRAMUSCULAR; INTRAVENOUS; SUBCUTANEOUS AS NEEDED
Status: DISCONTINUED | OUTPATIENT
Start: 2020-01-23 | End: 2020-01-24 | Stop reason: HOSPADM

## 2020-01-23 RX ORDER — SODIUM CHLORIDE 0.9 % (FLUSH) 0.9 %
5-10 SYRINGE (ML) INJECTION AS NEEDED
Status: DISCONTINUED | OUTPATIENT
Start: 2020-01-23 | End: 2020-01-24 | Stop reason: HOSPADM

## 2020-01-23 RX ORDER — HEPARIN 100 UNIT/ML
500 SYRINGE INTRAVENOUS AS NEEDED
Status: DISCONTINUED | OUTPATIENT
Start: 2020-01-23 | End: 2020-01-24 | Stop reason: HOSPADM

## 2020-01-23 RX ADMIN — Medication 500 UNITS: at 16:27

## 2020-01-23 RX ADMIN — Medication 10 ML: at 16:27

## 2020-01-23 RX ADMIN — SODIUM CHLORIDE 10 ML: 9 INJECTION INTRAMUSCULAR; INTRAVENOUS; SUBCUTANEOUS at 16:25

## 2020-01-23 NOTE — PROGRESS NOTES
[facility] Short Visit Progress Note    8627 Pt admit to [facility] for port flush/lab draw ambulatory in stable condition. Assessment completed. No new concerns voiced. Please see pending labs in CC. Patient Vitals for the past 12 hrs:   Temp Pulse Resp BP   01/23/20 1613 98.2 °F (36.8 °C) 97 20 148/86       Port with positive blood return. Lab drawn, flushed, heparinized and de-accessed per protocol. 1625 Pt tolerated treatment well. D/c home ambulatory in no distress. Pt aware of next appointment.   Future Appointments   Date Time Provider Gabriel Marjna   1/28/2020 11:30 AM Penny Sepulveda [facility]   2/20/2020  4:00 PM [facility] NURSE 6 [facility]   2/25/2020  9:30 AM Monroe Stone MD [address] [place] [address]   3/19/2020  4:00 PM [facility]   3/24/2020 10:00 AM Monroe Stone MD [address] [place] [address]   4/16/2020  4:00 [facility]   4/21/2020 10:00 AM Monroe Stone MD [address] [place] [address]   5/14/2020  4:00 PM [facility] NURSE 6 [place] [address] [place] [address]   5/19/2020 10:00 AM Monroe Stone MD [address] [place] [address]   6/11/2020  4:00 PM [facility]

## 2020-01-28 ENCOUNTER — OFFICE VISIT (OUTPATIENT)
Dept: GYNECOLOGY | Age: 60
End: 2020-01-28

## 2020-01-28 VITALS
SYSTOLIC BLOOD PRESSURE: 144 MMHG | HEART RATE: 102 BPM | HEIGHT: 69 IN | DIASTOLIC BLOOD PRESSURE: 80 MMHG | BODY MASS INDEX: 40.58 KG/M2 | WEIGHT: 274 LBS

## 2020-01-28 DIAGNOSIS — E66.01 MORBID OBESITY (HCC): ICD-10-CM

## 2020-01-28 DIAGNOSIS — C80.0 CARCINOMATOSIS (HCC): ICD-10-CM

## 2020-01-28 DIAGNOSIS — Z95.828 PORT-A-CATH IN PLACE: ICD-10-CM

## 2020-01-28 DIAGNOSIS — C79.51 PAIN DUE TO MALIGNANT NEOPLASM METASTATIC TO BONE (HCC): ICD-10-CM

## 2020-01-28 DIAGNOSIS — R59.0 LYMPHADENOPATHY, MEDIASTINAL: ICD-10-CM

## 2020-01-28 DIAGNOSIS — G89.3 PAIN DUE TO MALIGNANT NEOPLASM METASTATIC TO BONE (HCC): ICD-10-CM

## 2020-01-28 DIAGNOSIS — C56.9 MALIGNANT NEOPLASM OF OVARY, UNSPECIFIED LATERALITY (HCC): Primary | ICD-10-CM

## 2020-01-28 DIAGNOSIS — J06.9 UPPER RESPIRATORY TRACT INFECTION, UNSPECIFIED TYPE: ICD-10-CM

## 2020-01-28 RX ORDER — AMOXICILLIN AND CLAVULANATE POTASSIUM 875; 125 MG/1; MG/1
TABLET, FILM COATED ORAL
COMMUNITY
Start: 2020-01-23 | End: 2020-01-01

## 2020-01-28 NOTE — PROGRESS NOTES
one month follow up with [facility] , labs were drawn on 1/23/2020,  pt reports no abnormal spotting or bleeding, pt states she has no questions or concerns for today's visit    1. Have you been to the ER, urgent care clinic since your last visit? Hospitalized since your last visit?  no    2. Have you seen or consulted any other health care providers outside of the [address] [place] [address] since your last visit? Include any pap smears or colon screening.    no

## 2020-01-28 NOTE — PROGRESS NOTES
[facility] Mathias Moritz [facility], [address] [place] [address]  P [phone]  F [phone]    Office Visit    Patient ID:  Name: Michael Willis  MRN: [id number]  : 1960/59 y.o. Visit date: 2020    LEONID Castro is a 63 y.o.  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 The patient's previous treatment procedures include primary Taxol/carbo with retreatment in  and Doxil/Avastin, topotecan, gemzar and now single agent weekly Taxol initiated 18 following progression with chest wall involvement. In 2018, patient did not follow up and self-discontinued her treatment regimen. She presented to the ER on 18 with SOB. Negative workup for PE or MI. Admitted to Hospitalist service from -18. Managed for acute asthma exacerbation, acute FELICIA, and hyperkalemia. The patient was initiated on Susanna Doctor on 19. Underwent repeat CT C/A/P on 19 for purposes of following response to treatment. Patient was tolerating Susanna Doctor well for the first month, but then had a rise in her creatinine. However, she was also found to have hydronephrosis. Underwent right ureteral stent placement on 3/18/19. Restarted Zejula afterwards, although with continued rise in creatinine to >2. Held Susanna Doctor again and restarted Zejula at 100mg/daily on 19. Presents today for follow-up and consideration of continuation of Susanna Doctor. Completed 2700 cGy of radiation to her chest wall mass on 19. Tolerating treatment well. Held for one week in 10/2019 for blood transfusion secondary to anemia. Normal iron and B12 labs. Her Ca-125 has bounced around, but has been steadily declining recently. CT C/A/P on 10/2/19 with improvement in her disease. The pain at her chest wall mass is much improved. Otherwise the patient is really without complaints. Denies change in appetite or bowel habits. Denies vaginal bleeding, hematuria, hematochezia, constipation, diarrhea, nausea, vomiting, CP, SOB, fevers, or chills. Currently on amoxicillin for a UTI prescribed by her Urologist. CT C/A/P on 10/2/19 with improvement in her disease. Had ureteral stent exchange on 10/31/19.       OncTx History:  9/21/12: MARAH/BSO/Cytoreductive surgery on 9/21/12 noted carcinomatosis/omental caking. 10/2012-2/2013: 6 cycles Carbo/Taxol  8/2014-11/2014: 6 Cycles carbo/Taxol  3/2015-5/13/15: 3 cycles Avastin/Doxil until progression  7/2015-8/2015 Weekly Topotecan   12/2015-3/2016: Weekly Topotecan  2/2017-9/2017:  Gemzar D1/D8 Q28 days for 6 cycles  4/2018: CT core needle bx chest wall mass: Metastatic high-grade serous carcinoma (see comment)   General Categorization   Positive for malignancy. 5/8/2018-Present: Taxol  C2,5,43; S/P  Cycle #4  8/14/2018. Patient discontinued treatment secondary to side effects and fatigue. 1/23/2019 - Initiated Sydnee Lofts. Held do to rising creatinine. However, she was also found to have hydronephrosis. Underwent right ureteral stent placement on 3/18/19. Imaging Review:   CT C/A/P 10/2/19:   FINDINGS:     THYROID: Visualized gland is unremarkable. MEDIASTINUM: Upper left paratracheal node measures 2.2 x 3.4 cm (2, 8),  previously 2.3 x 3.5 cm. Superior prevascular node measures 2.9 x 3.0 cm,  previously 3.5 x 3.8 cm. Poorly delineated subcarinal node measures about 3.4 x  4.8 cm, previously 3.6 x 4.8 cm. Precardiac nodes measure 1.9 x 2.5 cm and 1.7 x  2.4 cm (2, 39), previously 2.1 x 2.7 cm and 2.0 x 2.0 cm. SWATI: No mass or lymphadenopathy. THORACIC AORTA: No dissection or aneurysm. MAIN PULMONARY ARTERY: Normal in caliber. TRACHEA/BRONCHI: Patent. ESOPHAGUS: No wall thickening or dilatation. HEART: Normal in size. PLEURA: No effusion or pneumothorax. LUNGS: No nodule, mass, or airspace disease.   LIVER: Multiple hypodense lesions suspicious for metastases, measuring up to 2.5  x 4.0 cm in segment 2 (2, 43) series, previously 2.8 x 3.7 cm, 3.2 x 4.6 cm in  segment 3 (2, 55), previously 3.1 x 4.5 cm, and partially exophytic from the  posterior inferior right lobe segment 6 measuring 2.5 x 3.5 cm, previously 2.8 x  3.4 cm. GALLBLADDER: Unremarkable. SPLEEN: Central 1.9 x 3.1 cm hypodensity (2, 47), previously 1.9 x 2.3 cm. Otherwise unremarkable. PANCREAS: Inseparable from bulky central abdominal adenopathy with no identified  ductal dilation. ADRENALS: Unremarkable. KIDNEYS: Right hydronephrosis has improved status post right nephroureteral  stent placement. Kidneys otherwise appear unremarkable. STOMACH: Unremarkable. SMALL BOWEL: No dilatation or wall thickening. COLON: No dilation or wall thickening. APPENDIX: Unremarkable. PERITONEUM: Extensive peritoneal implants with representative largest lesions  measuring 4.4 x 6.1 cm and the left adnexal region (2, 98), previously 4.3 x 5.7  cm, 3.8 x 5.9 cm in the anterior midline (2, 81), previously 3.4 x 5.4 cm, and  2.8 x 4.7 cm in the right abdomen (2, 78), previously 3.2 x 4.7 cm. RETROPERITONEUM: Extensive rocio hepatis, retroperitoneal, and pelvic sidewall  adenopathy. Conglomerate nkechi mass in the rocio hepatis region measures 5.7 x  11.1 cm (2, 58) is, previously 6.3 x 12.2 cm. Aortocaval node measures 3.7 x 5.3  cm (2, 77), previously 4.5 x 5.4 cm. Right pelvic sidewall node measures 2.9 x  4.2 cm (2, 103), previously 3.4 x 4.2 cm. Left external iliac node measures 3.0  x 4.8 cm (2, 101), previously 3.3 x 4.8 cm. REPRODUCTIVE ORGANS: Uterus and ovaries are surgically absent. URINARY BLADDER: No mass or calculus. Distal end of right nephroureteral stent  positioned within bladder lumen. BONES: Degenerative spine change. No acute fracture or aggressive lesion. ADDITIONAL COMMENTS: Mass centered at the right sternocostal junction measures  5.3 x 5.4 cm (2, 21), previously 4.8 x 5.7 cm.  Post surgical changes in the  anterior abdominal wall. Left Port-A-Cath in place. Bilateral inguinal  adenopathy measuring 2.1 x 3.2 cm on the right (2, 115), previously 2.3 x 3.7 cm  and 2.7 x 2.8 cm on the left (2, 108), previously 2.9 x 3.1 cm. .  IMPRESSION  IMPRESSION: Overall response to disease with either decrease in size or no  significant change in extensive nkechi metastases, hepatic metastases, right  sternal metastasis, and peritoneal carcinomatosis as above. No new metastasis or  progressive metastasis identified. Status post right nephroureteral stent with  interval resolution of right hydronephrosis. CT C/A/P 2/5/19:  FINDINGS:   THYROID: No nodule. MEDIASTINUM: Mediastinal lymphadenopathy is unchanged. A reference superior  mediastinal lymph node is stable measuring 3.3 cm. SWATI: No mass or lymphadenopathy. THORACIC AORTA: No dissection or aneurysm. MAIN PULMONARY ARTERY: Normal in caliber. TRACHEA/BRONCHI: Patent. ESOPHAGUS: No wall thickening or dilatation. HEART: Normal in size. PLEURA: No effusion or pneumothorax. LUNGS: No nodule, mass, or airspace disease. LIVER: Evaluation of the liver is limited by lack of intravenous contrast. There  is a vague 4.4 cm hypodense lesion in the left hepatic lobe previously measuring  3.0 cm. Lesion in the right hepatic lobe now measures 3.3 cm, previously  measuring 2.7 cm. GALLBLADDER: Unremarkable. SPLEEN: Central soft tissue abnormality has increased in size, now measuring 4.9  cm, previously measuring 3.1 cm. PANCREAS: Not well evaluated given the massive lymphadenopathy and lack of  intravenous contrast.  ADRENALS: Unremarkable. KIDNEYS: There is no moderate right hydronephrosis. STOMACH: Unremarkable. SMALL BOWEL: No dilatation or wall thickening. COLON: No dilatation or wall thickening. APPENDIX: Not visualized. PERITONEUM: Bulky gastrohepatic, periceliac, portacaval, and mesenteric  lymphadenopathy has increased. There is secondary invasion of the liver. Maximum  dimension in the upper abdomen is currently 10.4 cm, previously 9.3 cm. Elemental soft tissue nodules have increased in size, with a reference 7.0 cm  nodule in the left abdomen previously measuring 5.5 cm. Soft tissue nodule in  the deep pelvis between the bladder and colon has increased in size as well. RETROPERITONEUM: No lymphadenopathy or aortic aneurysm. REPRODUCTIVE ORGANS: The uterus and ovaries are surgically absent. URINARY BLADDER: No mass or calculus. BONES: Soft tissue mass involving the right sternum has not changed. Degenerative changes are seen in the thoracic and lumbar spine. ADDITIONAL COMMENTS: N/A  IMPRESSION  IMPRESSION:  Stable mediastinal lymphadenopathy. Progressive abdominal lymphadenopathy as  described above. Liver lesions have increased in size compared to the prior  exam. Stable right sternal mass. Moderate right hydroureteronephrosis is now  noted which appears to be related to the lymphadenopathy. ROS  Constitutional: no weight loss, fever, night sweats  Respiratory: no cough, shortness of breath, or wheezing  Cardiovascular: no chest pain or dyspnea on exertion. Reports chest wall mass smaller, no associated pain. Heme: No abnormal bleeding  Gastrointestinal: no abdominal pain, change in bowel habits, or black or bloody stools  Genito-Urinary: no dysuria, trouble voiding, or hematuria  Musculoskeletal: + swelling in ankle - bilateral, mostly only at end of day  Neurological: mild neuropathy  Derm: pigmentation/induration medial left leg. Prior injury from fall. Psych: positive for depression - controlled        PMH:  Past Medical History:   Diagnosis Date    Adverse effect of anesthesia     sleep apnea cpap machine useage     Anemia     Arthritis     DDD low back and knees    Asthma     uses albuterol prn only; none x 6 mos.   Never hospitalized    BRCA negative 1/2013    Calculus of kidney     Chronic kidney disease     kidney stones    Chronic pain knee pain bilat    CINV (chemotherapy-induced nausea and vomiting) 2014    Decreased hearing, right     PATIENT STATES THIS IS DUE TO CHEMOTHERAPY    Diabetes ([address] Age 46    IDDM    Environmental allergies     Fibromyalgia     GERD (gastroesophageal reflux disease)     controlled with med    Hx of pulmonary embolus during pregnancy 2015    Hydronephrosis due to obstruction of ureter 3/18/2019    Hypertension     Ill-defined condition     Sepsis  -  SOURCE PORT    Ill-defined condition 2016    chemotherapy     Mass of chest wall 2018    Morbid obesity ([address]     Murmur, heart     Other and unspecified hyperlipidemia     Ovarian cancer ([address] 2012, 2014    serous, high grade, stage IIIc. s/p chemotx (has port)    Psychiatric disorder     Depression/Anxiety    Rectal bleeding     Thromboembolus ([address]     POST PARTUM; resolved- PELVIS    Thyroid disease     HYPOTHYROID    Unspecified sleep apnea     CPAP, Dr. Clifford Victoria     PSH:  Past Surgical History:   Procedure Laterality Date    BREAST SURGERY PROCEDURE UNLISTED      Lymph node in left breast 2017   [place]     X2    HX HYSTERECTOMY  2012    ROULA/BSO, tumor debulking, omentectomy for ovarian cancer    HX ORTHOPAEDIC Right     ankle-FX, R    HX ORTHOPAEDIC Right     FX R ARM    HX UROLOGICAL Right 2017    STENT FOR KIDNEY STONES    HX VASCULAR ACCESS  2015    right chest- port placed    HX VASCULAR ACCESS Left 2017    TETE CATH     SOC:  Social History     Socioeconomic History    Marital status:      Spouse name: Not on file    Number of children: Not on file    Years of education: Not on file    Highest education level: Not on file   Occupational History    Not on file   Social Needs    Financial resource strain: Not on file    Food insecurity:     Worry: Not on file     Inability: Not on file    Transportation needs:     Medical: Not on file     Non-medical: Not on file   Tobacco Use    Smoking status: Never Smoker    Smokeless tobacco: Never Used   Substance and Sexual Activity    Alcohol use: Yes     Comment: 1 drink per month    Drug use: No    Sexual activity: Yes   Lifestyle    Physical activity:     Days per week: Not on file     Minutes per session: Not on file    Stress: Not on file   Relationships    Social connections:     Talks on phone: Not on file     Gets together: Not on file     Attends Latter day service: Not on file     Active member of club or organization: Not on file     Attends meetings of clubs or organizations: Not on file     Relationship status: Not on file    Intimate partner violence:     Fear of current or ex partner: Not on file     Emotionally abused: Not on file     Physically abused: Not on file     Forced sexual activity: Not on file   Other Topics Concern    Not on file   Social History Narrative    Lives in [place].  passed away in 5/16 of a heart attack. Has 2 sons. Disability. 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 Enjoys swimming and going to the gym.      Family History  Family History   Problem Relation Age of Onset    Hypertension Mother     Arthritis-osteo Mother     Hypertension Father     Arthritis-osteo Father     Cancer Father         PROSTATE    COPD Father     Hypertension Brother     Elevated Lipids Brother     Arthritis-osteo Brother     Hypertension Brother     Elevated Lipids Brother     Obesity Brother     Cancer Brother         PROSTATE    Anesth Problems Son         DELAYED AWAKENING    Diabetes Maternal Grandmother     Anesth Problems Paternal Grandmother         PATIENT [address], [address] STOPPED DURING SURGERY     Medications: (reviewed)  Current Outpatient Medications on File Prior to Visit   Medication Sig Dispense Refill    amoxicillin-clavulanate (AUGMENTIN) 875-125 mg per tablet       omega-3 acid ethyl esters (LOVAZA) 1 gram capsule TAKE 1 CAPSULE BY MOUTH TWICE A  Cap 2    cycloSPORINE (RESTASIS) 0.05 % dpet       albuterol (PROVENTIL VENTOLIN) 2.5 mg /3 mL (0.083 %) nebu Take 2.5 mg by inhalation.  dextroamphetamine-amphetamine (ADDERALL) 20 mg tablet TAKE 1 TABLET BY MOUTH EVERY DAY  0    rosuvastatin (CRESTOR) 10 mg tablet TAKE 1 TABLET BY MOUTH EVERY DAY  0    fluticasone propionate (FLONASE) 50 mcg/actuation nasal spray 2 SPRAYS IN BOTH NOSTRILS DAILY 1 Bottle 2    telmisartan (MICARDIS) 20 mg tablet TAKE 1 TABLET BY MOUTH EVERY DAY 90 Tab 3    atorvastatin (LIPITOR) 20 mg tablet       BD ULTRA-FINE SHORT PEN NEEDLE 31 gauge x 5/16\" ndle USE TO INJECT INSULIN 5 TIMES DAILY 200 Pen Needle 11    insulin aspart U-100 (NOVOLOG FLEXPEN U-100 INSULIN) 100 unit/mL (3 mL) inpn INJECT 20 UNITS BEFORE EACH MEAL + 4 UNITS FOR EVERY 50 MG/DL ABOVE 150 MG/DL.  UNITS PER DAY 90 Adjustable Dose Pre-filled Pen Syringe 3    LEVEMIR FLEXTOUCH U-100 INSULN 100 unit/mL (3 mL) inpn INJECT 30 UNITS IN AM AND 50 UNITS AT NIGHT. INCREASE AS DIRECTED TO  UNITS/DAY. 90 Adjustable Dose Pre-filled Pen Syringe 3    DEXILANT 60 mg CpDB capsule (delayed release) TAKE 1 CAPSULE BY MOUTH EVERY DAY 90 Cap 3    LINZESS 145 mcg cap capsule TAKE 1 CAPSULE BY MOUTH EVERY DAYY 30 Cap 0    ondansetron (ZOFRAN ODT) 4 mg disintegrating tablet Take 1 Tab by mouth every eight (8) hours as needed for Nausea. Indications: Nausea and Vomiting caused by Cancer Drugs 30 Tab 2    levothyroxine (SYNTHROID) 150 mcg tablet TAKE 1 TABLET BY MOUTH EVERY DAY BEFORE BREAKFAST 90 Tab 0    ZEJULA 100 mg cap Take 100 mg by mouth two (2) times a day.  glucose blood VI test strips (TRUE METRIX GLUCOSE TEST STRIP) strip by Does Not Apply route See Admin Instructions. 30 Strip 5    lancets misc by Does Not Apply route daily.  True Metrix lancets- test blood sugar once per day 30 Each 5    Blood-Glucose Meter monitoring kit Check up to tid, as directed 1 Kit 0    azelastine (ASTELIN) 137 mcg (0.1 %) nasal spray 1 [place] by Both Nostrils route daily as needed. Use in each nostril as directed       insulin detemir U-100 (LEVEMIR) 100 unit/mL injection 20 Units by SubCUTAneous route daily (with lunch).  montelukast (SINGULAIR) 10 mg tablet Take 10 mg by mouth nightly.  ascorbic acid, vitamin C, (VITAMIN C) 1,000 mg tablet Take 1,000 mg by mouth daily.  albuterol (PROVENTIL VENTOLIN) 2.5 mg /3 mL (0.083 %) nebulizer solution 2.5 mg by Nebulization route every four (4) hours as needed for Wheezing.  Liraglutide (VICTOZA) 0.6 mg/0.1 mL (18 mg/3 mL) pnij 1.8 mg by SubCUTAneous route daily (with lunch). 3 Pen 3    SYMBICORT 160-4.5 mcg/actuation HFAA Take 2 Puffs by inhalation two (2) times daily as needed. 2 Inhaler 3    clonazePAM (KLONOPIN) 0.5 mg tablet Take 0.5 mg by mouth nightly as needed.  sertraline (ZOLOFT) 50 mg tablet TAKE 1 TABLET BY MOUTH as needed  1    cholecalciferol, VITAMIN D3, (VITAMIN D3) 5,000 unit tab tablet Take 5,000 Units by mouth daily.  furosemide (LASIX) 40 mg tablet Take 40 mg by mouth every other day. 3    EPINEPHrine (EPIPEN) 0.3 mg/0.3 mL injection INJECT 0.3 ML BY INTRAMUSCULAR ROUTE ONCE AS NEEDED FOR UP TO 1 DOSE.  1     No current facility-administered medications on file prior to visit. Allergies: (reviewed)  Allergies   Allergen Reactions    Carboplatin Other (comments)     Patient developed shortness of breath, felt faint, coughing, skin flushed and \"itchy\". This occurred on the patients 8th treatment.  Iodinated Contrast Media Rash     13 hr pre-medication prior to IV contrast.   Patient has done well with 1 hr pre-medication of (Solu-Medrol) 40mg &  (Benadryl) 50mg.     Lipitor [Atorvastatin] Myalgia    Shellfish Containing Products Hives    Tape [Adhesive] Itching and Other (comments)     \"op site-- clear,thin tape\"--caused blister     Tomato Hives    Olmesartan Other (comments)    Losartan Other (comments)     headaches    Percocet [Oxycodone-Acetaminophen] Other (comments)     Fever; however, current home med       OBJECTIVE  Physical Exam  Visit Vitals  /80 (BP 1 Location: Left arm, BP Patient Position: Sitting)   Pulse (!) 102   Ht 5' 9\" (1.753 m)   Wt 274 lb (124.3 kg)   LMP 09/07/2011   BMI 40.46 kg/m²     Physical Exam:  General: Alert and oriented. No acute distress. Well-nourished  HEENT: No thyroid enlargment. Neck supple without restrictions. Sclera normal. Normal occular motion. Moist mucous membranes. Lymphatics: No evidence of axillary, cervical, or subclavicular adenopathy. Respiratory: clear to auscultation and percussion to the bases. No CVAT. Chest wall mass is still palpable along the superior sternum at the level of the superior aspect of the breasts, but improved. Normal overlying skin  Cardiovascular: regular rate and rhythm. No murmurs, rubs, or gallops. Gastrointestinal: soft, non-tender, non-distended, no masses or organomegaly. Well-healed incision. Musculoskeletal: normal gait. No joint tenderness, deformity or swelling. No muscular tenderness. Extremities: extremities normal, atraumatic, mild 1+ edema bilaterally. Pelvic: deferred today  Neuro: Grossly intact. Normal gait and movement. No acute deficit  Skin: No evidence of rashes or skin changes. DATE REVIEW as available:  Lab Results   Component Value Date/Time    WBC 5.9 01/23/2020 04:22 PM    Hemoglobin (POC) 10.5 (L) 03/20/2017 12:15 PM    HGB 7.8 (L) 01/23/2020 04:22 PM    Hematocrit (POC) 31 (L) 03/20/2017 12:15 PM    HCT 25.8 (L) 01/23/2020 04:22 PM    PLATELET 172 69/25/8783 04:22 PM    MCV 98.5 01/23/2020 04:22 PM     Lab Results   Component Value Date/Time    ABS.  NEUTROPHILS 3.5 01/23/2020 04:22 PM     Lab Results   Component Value Date/Time    Sodium 140 01/23/2020 04:22 PM    Potassium 5.4 (H) 01/23/2020 04:22 PM    Chloride 112 (H) 01/23/2020 04:22 PM    CO2 21 01/23/2020 04:22 PM    Anion gap 7 01/23/2020 04:22 PM    Glucose 125 (H) 01/23/2020 04:22 PM    BUN 26 (H) 01/23/2020 04:22 PM    Creatinine 1.96 (H) 01/23/2020 04:22 PM    BUN/Creatinine ratio 13 01/23/2020 04:22 PM    GFR est AA 32 (L) 01/23/2020 04:22 PM    GFR est non-AA 26 (L) 01/23/2020 04:22 PM    Calcium 8.9 01/23/2020 04:22 PM    Bilirubin, total 0.1 (L) 01/23/2020 04:22 PM    AST (SGOT) 75 (H) 01/23/2020 04:22 PM    Alk. phosphatase 171 (H) 01/23/2020 04:22 PM    Protein, total 8.6 (H) 01/23/2020 04:22 PM    Albumin 3.4 (L) 01/23/2020 04:22 PM    Globulin 5.2 (H) 01/23/2020 04:22 PM    A-G Ratio 0.7 (L) 01/23/2020 04:22 PM    ALT (SGPT) 54 01/23/2020 04:22 PM       ECHO 7/17/18  Left ventricle: Systolic function was normal. Ejection fraction was estimated in the range of 55 % to 60 %. There were no regional wall motion  abnormalities. Wall thickness was mildly increased. Left atrium: The atrium was mildly dilated. Mitral valve: There was mild regurgitation. Aortic valve: Leaflets exhibited sclerosis without stenosis. Not well visualized. IMPRESSION AND PLAN:  Ms. Prasad Garcia is a 63 y.o. female with recurrent, metastatic ovarian cancer. Heavily pre-treated. Lost to follow-up since 9/2018, at which time patient self-discontinued weekly paclitaxel. Initiated [facility] on 1/23/19. Held after 1 month secondary to rising creatinine. Found to have right hydronephrosis. Right ureteral stent placed 3/18/19 with normalization of creatinine. Completed 2700 cGy to the sternun 7/11/19. Restarted Zejula at 100mg/daily on 4/23/19. ECOG 1    Problem List Items Addressed This Visit        Immune    Lymphadenopathy, mediastinal       Reproductive    Ovarian cancer ([address] - Primary       Other    Carcinomatosis ([address]    Morbid obesity ([address]    Port-A-Cath in place    Pain due to malignant neoplasm metastatic to bone [facility])          Reviewed patient's course to date. Tolerating Zejula 100mg/daily. Creatinine stable.  Ca-125 continues to trend down/stable. CT C/A/P on 10/2/19 with improvement and stability of disease burden. Stable Hgb. Normal iron and B12 studies. She is tolerating treatment well. Will plan to continue treatment. RTC in 1 month for consideration of next cycle. Again noted that while the patient is BRCA negative, there is some evidence that PARPi may provide some benefit to BRCA wild-type patients. Previously I discussed goals of care, including that any type of treatment at this time is palliative in nature. I have reviewed her chemotherapy history, which is extensive; although the patient has been somewhat non-compliant with treatments with missed treatments \"here and there\". Stressed importance of remaining on treatment. Previously discussed that palliative therapy at this time has a response rate at best of 5-10% regardless of the type of treatment. All questions and concerns were addressed with the patient and she is comfortable with the plan.     Janny Robledo MD

## 2020-01-29 RX ORDER — FLUTICASONE PROPIONATE 50 MCG
SPRAY, SUSPENSION (ML) NASAL
Qty: 1 BOTTLE | Refills: 0 | Status: SHIPPED | OUTPATIENT
Start: 2020-01-29 | End: 2020-02-20

## 2020-01-31 ENCOUNTER — PATIENT OUTREACH (OUTPATIENT)
Dept: INTERNAL MEDICINE CLINIC | Age: 60
End: 2020-01-31

## 2020-02-17 ENCOUNTER — TELEPHONE (OUTPATIENT)
Dept: GYNECOLOGY | Age: 60
End: 2020-02-17

## 2020-02-17 RX ORDER — SODIUM CHLORIDE 0.9 % (FLUSH) 0.9 %
5-10 SYRINGE (ML) INJECTION AS NEEDED
Status: CANCELLED | OUTPATIENT
Start: 2020-02-20

## 2020-02-17 RX ORDER — HEPARIN 100 UNIT/ML
500 SYRINGE INTRAVENOUS AS NEEDED
Status: CANCELLED | OUTPATIENT
Start: 2020-02-20

## 2020-02-17 RX ORDER — SODIUM CHLORIDE 9 MG/ML
10 INJECTION INTRAMUSCULAR; INTRAVENOUS; SUBCUTANEOUS AS NEEDED
Status: CANCELLED | OUTPATIENT
Start: 2020-02-20

## 2020-02-17 NOTE — TELEPHONE ENCOUNTER
Appointment r/s with Dr. Cordell Ferrer per pt request as she is scheduled to have stent replaced on 2/25/20.

## 2020-02-18 NOTE — PROGRESS NOTES
[address], [address], [address]  P [phone]  F [phone]    Office Visit    Patient ID:  Name: William Chaidez  MRN: [id number]  : 1960/59 y.o. Visit date: 2020    LEONID Franco is a 63 y.o.  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 The patient's previous treatment procedures include primary Taxol/carbo with retreatment in  and Doxil/Avastin, topotecan, gemzar and now single agent weekly Taxol initiated 18 following progression with chest wall involvement. In 2018, patient did not follow up and self-discontinued her treatment regimen. She presented to the ER on 18 with SOB. Negative workup for PE or MI. Admitted to Hospitalist service from -18. Managed for acute asthma exacerbation, acute FELICIA, and hyperkalemia. The patient was initiated on Oracio Blaze on 19. Underwent repeat CT C/A/P on 19 for purposes of following response to treatment. Patient was tolerating Oracio Blaze well for the first month, but then had a rise in her creatinine. However, she was also found to have hydronephrosis. Underwent right ureteral stent placement on 3/18/19. Restarted Zejula afterwards, although with continued rise in creatinine to >2. Held Oracio Blaze again and restarted Zejula at 100mg/daily on 19. Presents today for follow-up and consideration of continuation of Oracio Blaze. Completed 2700 cGy of radiation to her chest wall mass on 19. Tolerating treatment well. Held for one week in 10/2019 for blood transfusion secondary to anemia. Normal iron and B12 labs. Her Ca-125 has bounced around, but has been steadily declining recently. CT C/A/P on 10/2/19 with improvement in her disease. The pain at her chest wall mass is much improved. Otherwise the patient is really without complaints. Denies change in appetite or bowel habits. Denies vaginal bleeding, hematuria, hematochezia, constipation, diarrhea, nausea, vomiting, CP, SOB, fevers, or chills. Currently on amoxicillin for a UTI prescribed by her Urologist. CT C/A/P on 10/2/19 with improvement in her disease. Had ureteral stent exchange on 10/31/19.       OncTx History:  9/21/12: MARAH/BSO/Cytoreductive surgery on 9/21/12 noted carcinomatosis/omental caking. 10/2012-2/2013: 6 cycles Carbo/Taxol  8/2014-11/2014: 6 Cycles carbo/Taxol  3/2015-5/13/15: 3 cycles Avastin/Doxil until progression  7/2015-8/2015 Weekly Topotecan   12/2015-3/2016: Weekly Topotecan  2/2017-9/2017:  Gemzar D1/D8 Q28 days for 6 cycles  4/2018: CT core needle bx chest wall mass: Metastatic high-grade serous carcinoma (see comment)   General Categorization   Positive for malignancy. 5/8/2018-Present: Taxol  F1,5,88; S/P  Cycle #4  8/14/2018. Patient discontinued treatment secondary to side effects and fatigue. 1/23/2019 - Initiated Llana Messenger. Held do to rising creatinine. However, she was also found to have hydronephrosis. Underwent right ureteral stent placement on 3/18/19. Imaging Review:   CT C/A/P 10/2/19:   FINDINGS:     THYROID: Visualized gland is unremarkable. MEDIASTINUM: Upper left paratracheal node measures 2.2 x 3.4 cm (2, 8),  previously 2.3 x 3.5 cm. Superior prevascular node measures 2.9 x 3.0 cm,  previously 3.5 x 3.8 cm. Poorly delineated subcarinal node measures about 3.4 x  4.8 cm, previously 3.6 x 4.8 cm. Precardiac nodes measure 1.9 x 2.5 cm and 1.7 x  2.4 cm (2, 39), previously 2.1 x 2.7 cm and 2.0 x 2.0 cm. SWATI: No mass or lymphadenopathy. THORACIC AORTA: No dissection or aneurysm. MAIN PULMONARY ARTERY: Normal in caliber. TRACHEA/BRONCHI: Patent. ESOPHAGUS: No wall thickening or dilatation. HEART: Normal in size. PLEURA: No effusion or pneumothorax. LUNGS: No nodule, mass, or airspace disease.   LIVER: Multiple hypodense lesions suspicious for metastases, measuring up to 2.5  x 4.0 cm in segment 2 (2, 43) series, previously 2.8 x 3.7 cm, 3.2 x 4.6 cm in  segment 3 (2, 55), previously 3.1 x 4.5 cm, and partially exophytic from the  posterior inferior right lobe segment 6 measuring 2.5 x 3.5 cm, previously 2.8 x  3.4 cm. GALLBLADDER: Unremarkable. SPLEEN: Central 1.9 x 3.1 cm hypodensity (2, 47), previously 1.9 x 2.3 cm. Otherwise unremarkable. PANCREAS: Inseparable from bulky central abdominal adenopathy with no identified  ductal dilation. ADRENALS: Unremarkable. KIDNEYS: Right hydronephrosis has improved status post right nephroureteral  stent placement. Kidneys otherwise appear unremarkable. STOMACH: Unremarkable. SMALL BOWEL: No dilatation or wall thickening. COLON: No dilation or wall thickening. APPENDIX: Unremarkable. PERITONEUM: Extensive peritoneal implants with representative largest lesions  measuring 4.4 x 6.1 cm and the left adnexal region (2, 98), previously 4.3 x 5.7  cm, 3.8 x 5.9 cm in the anterior midline (2, 81), previously 3.4 x 5.4 cm, and  2.8 x 4.7 cm in the right abdomen (2, 78), previously 3.2 x 4.7 cm. RETROPERITONEUM: Extensive rocio hepatis, retroperitoneal, and pelvic sidewall  adenopathy. Conglomerate nkechi mass in the rocio hepatis region measures 5.7 x  11.1 cm (2, 58) is, previously 6.3 x 12.2 cm. Aortocaval node measures 3.7 x 5.3  cm (2, 77), previously 4.5 x 5.4 cm. Right pelvic sidewall node measures 2.9 x  4.2 cm (2, 103), previously 3.4 x 4.2 cm. Left external iliac node measures 3.0  x 4.8 cm (2, 101), previously 3.3 x 4.8 cm. REPRODUCTIVE ORGANS: Uterus and ovaries are surgically absent. URINARY BLADDER: No mass or calculus. Distal end of right nephroureteral stent  positioned within bladder lumen. BONES: Degenerative spine change. No acute fracture or aggressive lesion. ADDITIONAL COMMENTS: Mass centered at the right sternocostal junction measures  5.3 x 5.4 cm (2, 21), previously 4.8 x 5.7 cm.  Post surgical changes in the  anterior abdominal wall. Left Port-A-Cath in place. Bilateral inguinal  adenopathy measuring 2.1 x 3.2 cm on the right (2, 115), previously 2.3 x 3.7 cm  and 2.7 x 2.8 cm on the left (2, 108), previously 2.9 x 3.1 cm. .  IMPRESSION  IMPRESSION: Overall response to disease with either decrease in size or no  significant change in extensive nkechi metastases, hepatic metastases, right  sternal metastasis, and peritoneal carcinomatosis as above. No new metastasis or  progressive metastasis identified. Status post right nephroureteral stent with  interval resolution of right hydronephrosis. CT C/A/P 2/5/19:  FINDINGS:   THYROID: No nodule. MEDIASTINUM: Mediastinal lymphadenopathy is unchanged. A reference superior  mediastinal lymph node is stable measuring 3.3 cm. SWATI: No mass or lymphadenopathy. THORACIC AORTA: No dissection or aneurysm. MAIN PULMONARY ARTERY: Normal in caliber. TRACHEA/BRONCHI: Patent. ESOPHAGUS: No wall thickening or dilatation. HEART: Normal in size. PLEURA: No effusion or pneumothorax. LUNGS: No nodule, mass, or airspace disease. LIVER: Evaluation of the liver is limited by lack of intravenous contrast. There  is a vague 4.4 cm hypodense lesion in the left hepatic lobe previously measuring  3.0 cm. Lesion in the right hepatic lobe now measures 3.3 cm, previously  measuring 2.7 cm. GALLBLADDER: Unremarkable. SPLEEN: Central soft tissue abnormality has increased in size, now measuring 4.9  cm, previously measuring 3.1 cm. PANCREAS: Not well evaluated given the massive lymphadenopathy and lack of  intravenous contrast.  ADRENALS: Unremarkable. KIDNEYS: There is no moderate right hydronephrosis. STOMACH: Unremarkable. SMALL BOWEL: No dilatation or wall thickening. COLON: No dilatation or wall thickening. APPENDIX: Not visualized. PERITONEUM: Bulky gastrohepatic, periceliac, portacaval, and mesenteric  lymphadenopathy has increased. There is secondary invasion of the liver. Maximum  dimension in the upper abdomen is currently 10.4 cm, previously 9.3 cm. Elemental soft tissue nodules have increased in size, with a reference 7.0 cm  nodule in the left abdomen previously measuring 5.5 cm. Soft tissue nodule in  the deep pelvis between the bladder and colon has increased in size as well. RETROPERITONEUM: No lymphadenopathy or aortic aneurysm. REPRODUCTIVE ORGANS: The uterus and ovaries are surgically absent. URINARY BLADDER: No mass or calculus. BONES: Soft tissue mass involving the right sternum has not changed. Degenerative changes are seen in the thoracic and lumbar spine. ADDITIONAL COMMENTS: N/A  IMPRESSION  IMPRESSION:  Stable mediastinal lymphadenopathy. Progressive abdominal lymphadenopathy as  described above. Liver lesions have increased in size compared to the prior  exam. Stable right sternal mass. Moderate right hydroureteronephrosis is now  noted which appears to be related to the lymphadenopathy. ROS  Constitutional: no weight loss, fever, night sweats  Respiratory: no cough, shortness of breath, or wheezing  Cardiovascular: no chest pain or dyspnea on exertion. Reports chest wall mass smaller, no associated pain. Heme: No abnormal bleeding  Gastrointestinal: no abdominal pain, change in bowel habits, or black or bloody stools  Genito-Urinary: no dysuria, trouble voiding, or hematuria  Musculoskeletal: + swelling in ankle - bilateral, mostly only at end of day  Neurological: mild neuropathy  Derm: pigmentation/induration medial left leg. Prior injury from fall. Psych: positive for depression - controlled        PMH:  Past Medical History:   Diagnosis Date    Adverse effect of anesthesia     sleep apnea cpap machine useage     Anemia     Arthritis     DDD low back and knees    Asthma     uses albuterol prn only; none x 6 mos.   Never hospitalized    BRCA negative 1/2013    Calculus of kidney     Chronic kidney disease     kidney stones    Chronic pain knee pain bilat    CINV (chemotherapy-induced nausea and vomiting) 2014    Decreased hearing, right     PATIENT STATES THIS IS DUE TO CHEMOTHERAPY    Diabetes ([facility] Age 46    IDDM    Environmental allergies     Fibromyalgia     GERD (gastroesophageal reflux disease)     controlled with med    Hx of pulmonary embolus during pregnancy 2015    Hydronephrosis due to obstruction of ureter 3/18/2019    Hypertension     Ill-defined condition     Sepsis  -  SOURCE PORT    Ill-defined condition 2016    chemotherapy     Mass of chest wall 2018    Morbid obesity ([address]     Murmur, heart     Other and unspecified hyperlipidemia     Ovarian cancer ([address] 2012, 2014    serous, high grade, stage IIIc. s/p chemotx (has port)    Psychiatric disorder     Depression/Anxiety    Rectal bleeding     Thromboembolus ([address]     POST PARTUM; resolved- PELVIS    Thyroid disease     HYPOTHYROID    Unspecified sleep apnea     CPAP, Dr. Colonel Willis     PSH:  Past Surgical History:   Procedure Laterality Date    BREAST SURGERY PROCEDURE UNLISTED      Lymph node in left breast 2017   [place]     X2    HX HYSTERECTOMY  2012    ROULA/BSO, tumor debulking, omentectomy for ovarian cancer    HX ORTHOPAEDIC Right     ankle-FX, R    HX ORTHOPAEDIC Right     FX R ARM    HX UROLOGICAL Right     STENT FOR KIDNEY STONES    HX VASCULAR ACCESS  2015    right chest- port placed    HX VASCULAR ACCESS Left 2017    TETE CATH     SOC:  Social History     Socioeconomic History    Marital status:      Spouse name: Not on file    Number of children: Not on file    Years of education: Not on file    Highest education level: Not on file   Occupational History    Not on file   Social Needs    Financial resource strain: Not on file    Food insecurity:     Worry: Not on file     Inability: Not on file    Transportation needs:     Medical: Not on file     Non-medical: Not on file   Tobacco Use    Smoking status: Never Smoker    Smokeless tobacco: Never Used   Substance and Sexual Activity    Alcohol use: Yes     Comment: 1 drink per month    Drug use: No    Sexual activity: Yes   Lifestyle    Physical activity:     Days per week: Not on file     Minutes per session: Not on file    Stress: Not on file   Relationships    Social connections:     Talks on phone: Not on file     Gets together: Not on file     Attends Sabianist service: Not on file     Active member of club or organization: Not on file     Attends meetings of clubs or organizations: Not on file     Relationship status: Not on file    Intimate partner violence:     Fear of current or ex partner: Not on file     Emotionally abused: Not on file     Physically abused: Not on file     Forced sexual activity: Not on file   Other Topics Concern    Not on file   Social History Narrative    Lives in [place].  passed away in 5/16 of a heart attack. Has 2 sons. Disability. 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 Enjoys swimming and going to the gym.      Family History  Family History   Problem Relation Age of Onset    Hypertension Mother     Arthritis-osteo Mother     Hypertension Father     Arthritis-osteo Father     Cancer Father         PROSTATE    COPD Father     Hypertension Brother     Elevated Lipids Brother     Arthritis-osteo Brother     Hypertension Brother     Elevated Lipids Brother     Obesity Brother     Cancer Brother         PROSTATE    Anesth Problems Son         DELAYED AWAKENING    Diabetes Maternal Grandmother     Anesth Problems Paternal Grandmother         PATIENT [address], [address] STOPPED DURING SURGERY     Medications: (reviewed)  Current Outpatient Medications on File Prior to Visit   Medication Sig Dispense Refill    fluticasone propionate (FLONASE) 50 mcg/actuation nasal spray 2 SPRAYS IN BOTH NOSTRILS DAILY 1 Bottle 0    amoxicillin-clavulanate (AUGMENTIN) 875-125 mg per tablet       omega-3 acid ethyl esters (LOVAZA) 1 gram capsule TAKE 1 CAPSULE BY MOUTH TWICE A  Cap 2    cycloSPORINE (RESTASIS) 0.05 % dpet       albuterol (PROVENTIL VENTOLIN) 2.5 mg /3 mL (0.083 %) nebu Take 2.5 mg by inhalation.  dextroamphetamine-amphetamine (ADDERALL) 20 mg tablet TAKE 1 TABLET BY MOUTH EVERY DAY  0    rosuvastatin (CRESTOR) 10 mg tablet TAKE 1 TABLET BY MOUTH EVERY DAY  0    telmisartan (MICARDIS) 20 mg tablet TAKE 1 TABLET BY MOUTH EVERY DAY 90 Tab 3    atorvastatin (LIPITOR) 20 mg tablet       BD ULTRA-FINE SHORT PEN NEEDLE 31 gauge x 5/16\" ndle USE TO INJECT INSULIN 5 TIMES DAILY 200 Pen Needle 11    insulin aspart U-100 (NOVOLOG FLEXPEN U-100 INSULIN) 100 unit/mL (3 mL) inpn INJECT 20 UNITS BEFORE EACH MEAL + 4 UNITS FOR EVERY 50 MG/DL ABOVE 150 MG/DL.  UNITS PER DAY 90 Adjustable Dose Pre-filled Pen Syringe 3    LEVEMIR FLEXTOUCH U-100 INSULN 100 unit/mL (3 mL) inpn INJECT 30 UNITS IN AM AND 50 UNITS AT NIGHT. INCREASE AS DIRECTED TO  UNITS/DAY. 90 Adjustable Dose Pre-filled Pen Syringe 3    DEXILANT 60 mg CpDB capsule (delayed release) TAKE 1 CAPSULE BY MOUTH EVERY DAY 90 Cap 3    LINZESS 145 mcg cap capsule TAKE 1 CAPSULE BY MOUTH EVERY DAYY 30 Cap 0    ondansetron (ZOFRAN ODT) 4 mg disintegrating tablet Take 1 Tab by mouth every eight (8) hours as needed for Nausea. Indications: Nausea and Vomiting caused by Cancer Drugs 30 Tab 2    levothyroxine (SYNTHROID) 150 mcg tablet TAKE 1 TABLET BY MOUTH EVERY DAY BEFORE BREAKFAST 90 Tab 0    ZEJULA 100 mg cap Take 100 mg by mouth two (2) times a day.  glucose blood VI test strips (TRUE METRIX GLUCOSE TEST STRIP) strip by Does Not Apply route See Admin Instructions. 30 Strip 5    lancets misc by Does Not Apply route daily.  True Metrix lancets- test blood sugar once per day 30 Each 5    Blood-Glucose Meter monitoring kit Check up to tid, as directed 1 Kit 0    azelastine (ASTELIN) 137 mcg (0.1 %) nasal spray 1 [place] by Both Nostrils route daily as needed. Use in each nostril as directed       insulin detemir U-100 (LEVEMIR) 100 unit/mL injection 20 Units by SubCUTAneous route daily (with lunch).  montelukast (SINGULAIR) 10 mg tablet Take 10 mg by mouth nightly.  ascorbic acid, vitamin C, (VITAMIN C) 1,000 mg tablet Take 1,000 mg by mouth daily.  albuterol (PROVENTIL VENTOLIN) 2.5 mg /3 mL (0.083 %) nebulizer solution 2.5 mg by Nebulization route every four (4) hours as needed for Wheezing.  Liraglutide (VICTOZA) 0.6 mg/0.1 mL (18 mg/3 mL) pnij 1.8 mg by SubCUTAneous route daily (with lunch). 3 Pen 3    SYMBICORT 160-4.5 mcg/actuation HFAA Take 2 Puffs by inhalation two (2) times daily as needed. 2 Inhaler 3    clonazePAM (KLONOPIN) 0.5 mg tablet Take 0.5 mg by mouth nightly as needed.  sertraline (ZOLOFT) 50 mg tablet TAKE 1 TABLET BY MOUTH as needed  1    cholecalciferol, VITAMIN D3, (VITAMIN D3) 5,000 unit tab tablet Take 5,000 Units by mouth daily.  furosemide (LASIX) 40 mg tablet Take 40 mg by mouth every other day. 3    EPINEPHrine (EPIPEN) 0.3 mg/0.3 mL injection INJECT 0.3 ML BY INTRAMUSCULAR ROUTE ONCE AS NEEDED FOR UP TO 1 DOSE.  1     No current facility-administered medications on file prior to visit. Allergies: (reviewed)  Allergies   Allergen Reactions    Carboplatin Other (comments)     Patient developed shortness of breath, felt faint, coughing, skin flushed and \"itchy\". This occurred on the patients 8th treatment.  Iodinated Contrast Media Rash     13 hr pre-medication prior to IV contrast.   Patient has done well with 1 hr pre-medication of (Solu-Medrol) 40mg &  (Benadryl) 50mg.     Lipitor [Atorvastatin] Myalgia    Shellfish Containing Products Hives    Tape [Adhesive] Itching and Other (comments)     \"op site-- clear,thin tape\"--caused blister     Tomato Hives    Olmesartan Other (comments)    Losartan Other (comments)     headaches    Percocet [Oxycodone-Acetaminophen] Other (comments)     Fever; however, current home med       OBJECTIVE  Physical Exam  Visit Vitals  /83 (BP 1 Location: Left arm, BP Patient Position: Sitting)   Pulse 89   Ht 5' 9\" (1.753 m)   Wt 274 lb 3.2 oz (124.4 kg)   LMP 09/07/2011   BMI 40.49 kg/m²     Physical Exam:  General: Alert and oriented. No acute distress. Well-nourished  HEENT: No thyroid enlargment. Neck supple without restrictions. Sclera normal. Normal occular motion. Moist mucous membranes. Lymphatics: No evidence of axillary, cervical, or subclavicular adenopathy. Respiratory: clear to auscultation and percussion to the bases. No CVAT. Chest wall mass is still palpable along the superior sternum at the level of the superior aspect of the breasts, but improved. Normal overlying skin  Cardiovascular: regular rate and rhythm. No murmurs, rubs, or gallops. Gastrointestinal: soft, non-tender, non-distended, no masses or organomegaly. Well-healed incision. Musculoskeletal: normal gait. No joint tenderness, deformity or swelling. No muscular tenderness. Extremities: extremities normal, atraumatic, mild 1+ edema bilaterally. Pelvic: deferred today  Neuro: Grossly intact. Normal gait and movement. No acute deficit  Skin: No evidence of rashes or skin changes. DATE REVIEW as available:  Lab Results   Component Value Date/Time    WBC 5.9 01/23/2020 04:22 PM    Hemoglobin (POC) 10.5 (L) 03/20/2017 12:15 PM    HGB 7.8 (L) 01/23/2020 04:22 PM    Hematocrit (POC) 31 (L) 03/20/2017 12:15 PM    HCT 25.8 (L) 01/23/2020 04:22 PM    PLATELET 808 07/98/0290 04:22 PM    MCV 98.5 01/23/2020 04:22 PM     Lab Results   Component Value Date/Time    ABS.  NEUTROPHILS 3.5 01/23/2020 04:22 PM     Lab Results   Component Value Date/Time    Sodium 140 01/23/2020 04:22 PM    Potassium 5.4 (H) 01/23/2020 04:22 PM    Chloride 112 (H) 01/23/2020 04:22 PM    CO2 21 01/23/2020 04:22 PM    Anion gap 7 01/23/2020 04:22 PM    Glucose 125 (H) 01/23/2020 04:22 PM    BUN 26 (H) 01/23/2020 04:22 PM    Creatinine 1.96 (H) 01/23/2020 04:22 PM    BUN/Creatinine ratio 13 01/23/2020 04:22 PM    GFR est AA 32 (L) 01/23/2020 04:22 PM    GFR est non-AA 26 (L) 01/23/2020 04:22 PM    Calcium 8.9 01/23/2020 04:22 PM    Bilirubin, total 0.1 (L) 01/23/2020 04:22 PM    AST (SGOT) 75 (H) 01/23/2020 04:22 PM    Alk. phosphatase 171 (H) 01/23/2020 04:22 PM    Protein, total 8.6 (H) 01/23/2020 04:22 PM    Albumin 3.4 (L) 01/23/2020 04:22 PM    Globulin 5.2 (H) 01/23/2020 04:22 PM    A-G Ratio 0.7 (L) 01/23/2020 04:22 PM    ALT (SGPT) 54 01/23/2020 04:22 PM       ECHO 7/17/18  Left ventricle: Systolic function was normal. Ejection fraction was estimated in the range of 55 % to 60 %. There were no regional wall motion  abnormalities. Wall thickness was mildly increased. Left atrium: The atrium was mildly dilated. Mitral valve: There was mild regurgitation. Aortic valve: Leaflets exhibited sclerosis without stenosis. Not well visualized. IMPRESSION AND PLAN:  Ms. Kristen Day is a 63 y.o. female with recurrent, metastatic ovarian cancer. Heavily pre-treated. Lost to follow-up since 9/2018, at which time patient self-discontinued weekly paclitaxel. Initiated Llana Messenger on 1/23/19. Held after 1 month secondary to rising creatinine. Found to have right hydronephrosis. Right ureteral stent placed 3/18/19 with normalization of creatinine. Completed 2700 cGy to the sternun 7/11/19. Restarted Zejula at 100mg/daily on 4/23/19. ECOG 1    Problem List Items Addressed This Visit        Circulatory    Benign essential hypertension       Immune    Lymphadenopathy, mediastinal       Reproductive    Ovarian cancer ([address] - Primary       Other    Carcinomatosis ([address]    Morbid obesity ([address]    Mass of chest wall    S/P ureteral stent placement          Reviewed patient's course to date.  Kimberly Mcpherson 100mg/daily. Creatinine stable. Ca-125 continues to trend down/stable. CT C/A/P on 10/2/19 with improvement and stability of disease burden. Stable Hgb. Normal iron and B12 studies. She is tolerating treatment well. Will plan to continue treatment. RTC in 1 month for consideration of next cycle. Again noted that while the patient is BRCA negative, there is some evidence that PARPi may provide some benefit to BRCA wild-type patients. Previously I discussed goals of care, including that any type of treatment at this time is palliative in nature. I have reviewed her chemotherapy history, which is extensive; although the patient has been somewhat non-compliant with treatments with missed treatments \"here and there\". Stressed importance of remaining on treatment. Previously discussed that palliative therapy at this time has a response rate at best of 5-10% regardless of the type of treatment. Urology is managing her stent and recurrent UTIs. She is scheduled for a stent exchange next week. Continue to f/u with Urology. .     All questions and concerns were addressed with the patient and she is comfortable with the plan.     Geetha Birch MD

## 2020-02-19 ENCOUNTER — OFFICE VISIT (OUTPATIENT)
Dept: GYNECOLOGY | Age: 60
End: 2020-02-19

## 2020-02-19 VITALS
SYSTOLIC BLOOD PRESSURE: 129 MMHG | HEART RATE: 89 BPM | BODY MASS INDEX: 40.61 KG/M2 | HEIGHT: 69 IN | DIASTOLIC BLOOD PRESSURE: 83 MMHG | WEIGHT: 274.2 LBS

## 2020-02-19 DIAGNOSIS — I10 BENIGN ESSENTIAL HYPERTENSION: ICD-10-CM

## 2020-02-19 DIAGNOSIS — Z96.0 S/P URETERAL STENT PLACEMENT: ICD-10-CM

## 2020-02-19 DIAGNOSIS — E66.01 MORBID OBESITY (HCC): ICD-10-CM

## 2020-02-19 DIAGNOSIS — C56.9 MALIGNANT NEOPLASM OF OVARY, UNSPECIFIED LATERALITY (HCC): Primary | ICD-10-CM

## 2020-02-19 DIAGNOSIS — R59.0 LYMPHADENOPATHY, MEDIASTINAL: ICD-10-CM

## 2020-02-19 DIAGNOSIS — R22.2 MASS OF CHEST WALL: ICD-10-CM

## 2020-02-19 DIAGNOSIS — C80.0 CARCINOMATOSIS (HCC): ICD-10-CM

## 2020-02-19 NOTE — PROGRESS NOTES
one month follow up with irma, labs were drawn on 1/23/2020, pt states she is having her stents changed next week    Vitals:    02/19/20 1502 02/19/20 1510   BP: 156/90 129/83   BP 1 Location: Left arm Left arm   BP Patient Position: Sitting Sitting   Pulse: 94 89   Weight: 274 lb 3.2 oz (124.4 kg)    Height: 5' 9\" (1.753 m)          1. Have you been to the ER, urgent care clinic since your last visit? Hospitalized since your last visit?  no    2. Have you seen or consulted any other health care providers outside of the [address] [place] [address] since your last visit? Include any pap smears or colon screening.    no

## 2020-02-20 ENCOUNTER — HOSPITAL ENCOUNTER (OUTPATIENT)
Dept: INFUSION THERAPY | Age: 60
Discharge: HOME | End: 2020-02-20
Payer: MEDICARE

## 2020-02-20 VITALS
DIASTOLIC BLOOD PRESSURE: 63 MMHG | HEART RATE: 84 BPM | RESPIRATION RATE: 20 BRPM | OXYGEN SATURATION: 97 % | SYSTOLIC BLOOD PRESSURE: 137 MMHG | TEMPERATURE: 98.1 F

## 2020-02-20 DIAGNOSIS — J06.9 UPPER RESPIRATORY TRACT INFECTION, UNSPECIFIED TYPE: ICD-10-CM

## 2020-02-20 DIAGNOSIS — C56.9 MALIGNANT NEOPLASM OF OVARY, UNSPECIFIED LATERALITY (HCC): Primary | ICD-10-CM

## 2020-02-20 LAB
ALBUMIN SERPL-MCNC: 3.3 G/DL (ref 3.5–5)
ALBUMIN/GLOB SERPL: 0.6 {RATIO} (ref 1.1–2.2)
ALP SERPL-CCNC: 164 U/L (ref 45–117)
ALT SERPL-CCNC: 64 U/L (ref 12–78)
ANION GAP SERPL CALC-SCNC: 7 MMOL/L (ref 5–15)
AST SERPL-CCNC: 59 U/L (ref 15–37)
BASOPHILS # BLD: 0.1 K/UL (ref 0–0.1)
BASOPHILS NFR BLD: 1 % (ref 0–1)
BILIRUB SERPL-MCNC: 0.3 MG/DL (ref 0.2–1)
BUN SERPL-MCNC: 31 MG/DL (ref 6–20)
BUN/CREAT SERPL: 19 (ref 12–20)
CALCIUM SERPL-MCNC: 9 MG/DL (ref 8.5–10.1)
CANCER AG125 SERPL-ACNC: 220 U/ML (ref 1.5–35)
CHLORIDE SERPL-SCNC: 112 MMOL/L (ref 97–108)
CO2 SERPL-SCNC: 20 MMOL/L (ref 21–32)
CREAT SERPL-MCNC: 1.59 MG/DL (ref 0.55–1.02)
DIFFERENTIAL METHOD BLD: ABNORMAL
EOSINOPHIL # BLD: 0.5 K/UL (ref 0–0.4)
EOSINOPHIL NFR BLD: 8 % (ref 0–7)
ERYTHROCYTE [DISTWIDTH] IN BLOOD BY AUTOMATED COUNT: 14.4 % (ref 11.5–14.5)
GLOBULIN SER CALC-MCNC: 5.5 G/DL (ref 2–4)
GLUCOSE SERPL-MCNC: 119 MG/DL (ref 65–100)
HCT VFR BLD AUTO: 24.8 % (ref 35–47)
HGB BLD-MCNC: 7.4 G/DL (ref 11.5–16)
IMM GRANULOCYTES # BLD AUTO: 0 K/UL (ref 0–0.04)
IMM GRANULOCYTES NFR BLD AUTO: 0 % (ref 0–0.5)
LYMPHOCYTES # BLD: 1.3 K/UL (ref 0.8–3.5)
LYMPHOCYTES NFR BLD: 19 % (ref 12–49)
MCH RBC QN AUTO: 29.2 PG (ref 26–34)
MCHC RBC AUTO-ENTMCNC: 29.8 G/DL (ref 30–36.5)
MCV RBC AUTO: 98 FL (ref 80–99)
MONOCYTES # BLD: 0.7 K/UL (ref 0–1)
MONOCYTES NFR BLD: 11 % (ref 5–13)
NEUTS SEG # BLD: 3.9 K/UL (ref 1.8–8)
NEUTS SEG NFR BLD: 61 % (ref 32–75)
NRBC # BLD: 0 K/UL (ref 0–0.01)
NRBC BLD-RTO: 0 PER 100 WBC
PLATELET # BLD AUTO: 145 K/UL (ref 150–400)
PMV BLD AUTO: 9.5 FL (ref 8.9–12.9)
POTASSIUM SERPL-SCNC: 4.8 MMOL/L (ref 3.5–5.1)
PROT SERPL-MCNC: 8.8 G/DL (ref 6.4–8.2)
RBC # BLD AUTO: 2.53 M/UL (ref 3.8–5.2)
SODIUM SERPL-SCNC: 139 MMOL/L (ref 136–145)
WBC # BLD AUTO: 6.5 K/UL (ref 3.6–11)

## 2020-02-20 PROCEDURE — 36591 DRAW BLOOD OFF VENOUS DEVICE: CPT

## 2020-02-20 PROCEDURE — 80053 COMPREHEN METABOLIC PANEL: CPT

## 2020-02-20 PROCEDURE — 77030012965 HC NDL HUBR BBMI -A

## 2020-02-20 PROCEDURE — 36415 COLL VENOUS BLD VENIPUNCTURE: CPT

## 2020-02-20 PROCEDURE — 85025 COMPLETE CBC W/AUTO DIFF WBC: CPT

## 2020-02-20 PROCEDURE — 74011250636 HC RX REV CODE- 250/636: Performed by: OBSTETRICS & GYNECOLOGY

## 2020-02-20 PROCEDURE — 86304 IMMUNOASSAY TUMOR CA 125: CPT

## 2020-02-20 RX ORDER — HEPARIN 100 UNIT/ML
500 SYRINGE INTRAVENOUS AS NEEDED
Status: DISCONTINUED | OUTPATIENT
Start: 2020-02-20 | End: 2020-02-21 | Stop reason: HOSPADM

## 2020-02-20 RX ORDER — SODIUM CHLORIDE 0.9 % (FLUSH) 0.9 %
5-10 SYRINGE (ML) INJECTION AS NEEDED
Status: DISCONTINUED | OUTPATIENT
Start: 2020-02-20 | End: 2020-02-21 | Stop reason: HOSPADM

## 2020-02-20 RX ORDER — FLUTICASONE PROPIONATE 50 MCG
SPRAY, SUSPENSION (ML) NASAL
Qty: 1 BOTTLE | Refills: 0 | Status: ON HOLD | OUTPATIENT
Start: 2020-02-20 | End: 2021-01-01

## 2020-02-20 RX ORDER — SODIUM CHLORIDE 9 MG/ML
10 INJECTION INTRAMUSCULAR; INTRAVENOUS; SUBCUTANEOUS AS NEEDED
Status: DISCONTINUED | OUTPATIENT
Start: 2020-02-20 | End: 2020-02-21 | Stop reason: HOSPADM

## 2020-02-20 RX ADMIN — Medication 500 UNITS: at 16:12

## 2020-02-20 RX ADMIN — Medication 10 ML: at 16:10

## 2020-02-20 RX ADMIN — SODIUM CHLORIDE 10 ML: 9 INJECTION INTRAMUSCULAR; INTRAVENOUS; SUBCUTANEOUS at 16:12

## 2020-02-20 NOTE — PROGRESS NOTES
Outpatient Infusion Center Progress Note    [id number] Pt admit to [facility] for port flush/labs ambulatory in stable condition. Assessment completed. No new concerns voiced. Port accessed without difficulty. Labs drawn, port flushed and heparinized per protocol and de-accessed. Patient tolerarated treatment well. She was discharged from the center at 1625.     Visit Vitals  /63 (BP 1 Location: Left arm, BP Patient Position: Sitting)   Pulse 84   Temp 98.1 °F (36.7 °C)   Resp 20   LMP 09/07/2011   SpO2 97%   Breastfeeding Yaneth Ahn, NIKIA

## 2020-02-26 NOTE — TELEPHONE ENCOUNTER
Accompanied by: Mother and Father    Lee Bunch is a 4year old male who presents with complains of some upper respiratory symptoms. For the last 1 1/2 day(s), he has primarily had fever, fatigue and body aches. He also has some runny nose. He denies cough, sore throat, ear pain and diarrhea. He has taken otc meds with relief. He has been exposed to influenza. SOB:  no  Rash:  no  Voiding Normally:  Yes    PMH:  No past medical history on file. Alg:  ALLERGIES: no known allergies. Meds:   No current outpatient medications on file. No current facility-administered medications for this visit. Immunizations:  up to date     OBJECTIVE:  Visit Vitals  Pulse (!) 148   Temp (!) 101.6 Â°F (38.7 Â°C) (Temporal)   Resp 28   Wt 19.1 kg (42 lb 3.2 oz)   SpO2 98%     Gen:   Healthy  Eyes:  anicteric, no conjunctival injection         EOMI/PERRL  Ears: External ears normal. Canals clear. Tympanic membranes are clear with all landmarks noted. Nares:  --- POSITIVE FINDINGS: clear rhinorrhea  O/P:   Minimal erythema; no uvular deviation or swelling         mucous membranes moist  Neck:  supple, minor positive anterior cervical lymphadenopathy         no supraclavicular lymphadenopathy  Lungs: Clear to auscultation; good air entry         no use of accessory muscles   CV:  Regular rate and rhythm; no S3/S4; no murmur  Skin:  Good turgor; cap refill less than 3 seconds; no cyanosis          No rashes    Assessment/Plan  Influenza    I discussed this with his Mother and Father. Reassurance given  Recommended over the counter symptomatic treatments. Discussed with patient the viral, self-limiting nature of their illness. He was advised that viral infections typically average around 7-10 days and that antibiotics do not alter the course or improve the symptoms from viral infections.  The patient was also advised that secondary bacterial infections can sometimes arise but that treating with antibiotics now will not Patient states she was returning your call. Patient states she was speaking to Federico Alexandre as soon as she hung up she got a call back & no message was left. Please call.  Thank you change that. Tamiflu prescribed. Patient/Parent advised that if you start within symptom onset, that it on averages shortens us symptoms by 1-2 days. It also may cause an upset stomach and other side effects. Increased fluids and rest.  Pt is to f/u if symptoms persist past the 7-10 day period, if new symptoms arise or if the current symptoms worsen, including but not limited to high fevers, dizziness or difficulty breathing. The pt's parent indicates understanding and agrees with this plan of care.     Electronically signed by:  Billie Tirado MD on 2/26/2020

## 2020-03-16 RX ORDER — HEPARIN 100 UNIT/ML
500 SYRINGE INTRAVENOUS AS NEEDED
Status: CANCELLED | OUTPATIENT
Start: 2020-03-19

## 2020-03-16 RX ORDER — SODIUM CHLORIDE 0.9 % (FLUSH) 0.9 %
5-10 SYRINGE (ML) INJECTION AS NEEDED
Status: CANCELLED | OUTPATIENT
Start: 2020-03-19

## 2020-03-16 RX ORDER — SODIUM CHLORIDE 9 MG/ML
10 INJECTION INTRAMUSCULAR; INTRAVENOUS; SUBCUTANEOUS AS NEEDED
Status: CANCELLED | OUTPATIENT
Start: 2020-03-19

## 2020-03-18 ENCOUNTER — TELEPHONE (OUTPATIENT)
Dept: GYNECOLOGY | Age: 60
End: 2020-03-18

## 2020-03-18 NOTE — TELEPHONE ENCOUNTER
Confirmed port flush/labs with pt. She states she is doing well with Medardo Calvo and we cancelled her appt with Dr. Keegan Ramos for 3/24/20.

## 2020-03-19 ENCOUNTER — HOSPITAL ENCOUNTER (OUTPATIENT)
Dept: INFUSION THERAPY | Age: 60
Discharge: HOME | End: 2020-03-19
Payer: MEDICARE

## 2020-03-19 VITALS
TEMPERATURE: 98.4 F | HEART RATE: 82 BPM | RESPIRATION RATE: 18 BRPM | SYSTOLIC BLOOD PRESSURE: 127 MMHG | DIASTOLIC BLOOD PRESSURE: 75 MMHG

## 2020-03-19 DIAGNOSIS — C56.9 MALIGNANT NEOPLASM OF OVARY, UNSPECIFIED LATERALITY (HCC): Primary | ICD-10-CM

## 2020-03-19 LAB
ALBUMIN SERPL-MCNC: 3.4 G/DL (ref 3.5–5)
ALBUMIN/GLOB SERPL: 0.7 {RATIO} (ref 1.1–2.2)
ALP SERPL-CCNC: 146 U/L (ref 45–117)
ALT SERPL-CCNC: 78 U/L (ref 12–78)
ANION GAP SERPL CALC-SCNC: 5 MMOL/L (ref 5–15)
AST SERPL-CCNC: 80 U/L (ref 15–37)
BASOPHILS # BLD: 0.1 K/UL (ref 0–0.1)
BASOPHILS NFR BLD: 1 % (ref 0–1)
BILIRUB SERPL-MCNC: 0.2 MG/DL (ref 0.2–1)
BUN SERPL-MCNC: 27 MG/DL (ref 6–20)
BUN/CREAT SERPL: 18 (ref 12–20)
CALCIUM SERPL-MCNC: 8.9 MG/DL (ref 8.5–10.1)
CANCER AG125 SERPL-ACNC: 226 U/ML (ref 1.5–35)
CHLORIDE SERPL-SCNC: 114 MMOL/L (ref 97–108)
CO2 SERPL-SCNC: 21 MMOL/L (ref 21–32)
CREAT SERPL-MCNC: 1.52 MG/DL (ref 0.55–1.02)
DIFFERENTIAL METHOD BLD: ABNORMAL
EOSINOPHIL # BLD: 0.5 K/UL (ref 0–0.4)
EOSINOPHIL NFR BLD: 10 % (ref 0–7)
ERYTHROCYTE [DISTWIDTH] IN BLOOD BY AUTOMATED COUNT: 14.3 % (ref 11.5–14.5)
GLOBULIN SER CALC-MCNC: 4.7 G/DL (ref 2–4)
GLUCOSE SERPL-MCNC: 123 MG/DL (ref 65–100)
HCT VFR BLD AUTO: 24.4 % (ref 35–47)
HGB BLD-MCNC: 7.2 G/DL (ref 11.5–16)
IMM GRANULOCYTES # BLD AUTO: 0 K/UL (ref 0–0.04)
IMM GRANULOCYTES NFR BLD AUTO: 0 % (ref 0–0.5)
LYMPHOCYTES # BLD: 1.2 K/UL (ref 0.8–3.5)
LYMPHOCYTES NFR BLD: 24 % (ref 12–49)
MCH RBC QN AUTO: 29.4 PG (ref 26–34)
MCHC RBC AUTO-ENTMCNC: 29.5 G/DL (ref 30–36.5)
MCV RBC AUTO: 99.6 FL (ref 80–99)
MONOCYTES # BLD: 0.7 K/UL (ref 0–1)
MONOCYTES NFR BLD: 14 % (ref 5–13)
NEUTS SEG # BLD: 2.4 K/UL (ref 1.8–8)
NEUTS SEG NFR BLD: 51 % (ref 32–75)
NRBC # BLD: 0 K/UL (ref 0–0.01)
NRBC BLD-RTO: 0 PER 100 WBC
PLATELET # BLD AUTO: 145 K/UL (ref 150–400)
PMV BLD AUTO: 9.8 FL (ref 8.9–12.9)
POTASSIUM SERPL-SCNC: 5.2 MMOL/L (ref 3.5–5.1)
PROT SERPL-MCNC: 8.1 G/DL (ref 6.4–8.2)
RBC # BLD AUTO: 2.45 M/UL (ref 3.8–5.2)
SODIUM SERPL-SCNC: 140 MMOL/L (ref 136–145)
WBC # BLD AUTO: 4.7 K/UL (ref 3.6–11)

## 2020-03-19 PROCEDURE — 77030012965 HC NDL HUBR BBMI -A

## 2020-03-19 PROCEDURE — 74011250636 HC RX REV CODE- 250/636: Performed by: PHYSICIAN ASSISTANT

## 2020-03-19 PROCEDURE — 86304 IMMUNOASSAY TUMOR CA 125: CPT

## 2020-03-19 PROCEDURE — 80053 COMPREHEN METABOLIC PANEL: CPT

## 2020-03-19 PROCEDURE — 36591 DRAW BLOOD OFF VENOUS DEVICE: CPT

## 2020-03-19 PROCEDURE — 85025 COMPLETE CBC W/AUTO DIFF WBC: CPT

## 2020-03-19 PROCEDURE — 36415 COLL VENOUS BLD VENIPUNCTURE: CPT

## 2020-03-19 RX ORDER — SODIUM CHLORIDE 9 MG/ML
10 INJECTION INTRAMUSCULAR; INTRAVENOUS; SUBCUTANEOUS AS NEEDED
Status: DISCONTINUED | OUTPATIENT
Start: 2020-03-19 | End: 2020-03-20 | Stop reason: HOSPADM

## 2020-03-19 RX ORDER — SODIUM CHLORIDE 0.9 % (FLUSH) 0.9 %
5-10 SYRINGE (ML) INJECTION AS NEEDED
Status: DISCONTINUED | OUTPATIENT
Start: 2020-03-19 | End: 2020-03-20 | Stop reason: HOSPADM

## 2020-03-19 RX ORDER — HEPARIN 100 UNIT/ML
500 SYRINGE INTRAVENOUS AS NEEDED
Status: DISCONTINUED | OUTPATIENT
Start: 2020-03-19 | End: 2020-03-20 | Stop reason: HOSPADM

## 2020-03-19 RX ADMIN — SODIUM CHLORIDE 10 ML: 9 INJECTION INTRAMUSCULAR; INTRAVENOUS; SUBCUTANEOUS at 16:15

## 2020-03-19 RX ADMIN — Medication 10 ML: at 16:15

## 2020-03-19 RX ADMIN — Medication 500 UNITS: at 16:15

## 2020-03-19 NOTE — PROGRESS NOTES
[facility] Short Visit Progress Note    4279 Pt admit to [place] for port flush/lab draw ambulatory in stable condition. Assessment completed. No new concerns voiced. Please see pending labs in CC. Patient Vitals for the past 12 hrs:   Temp Pulse Resp BP   03/19/20 1553 98.4 °F (36.9 °C) 82 18 127/75       Port with positive blood return. Lab drawn, flushed, heparinized and de-accessed per protocol. 1615 Pt tolerated treatment well. D/c home ambulatory in no distress. Pt aware of next appointment.   Future Appointments   Date Time Provider Gabriel Phillip   4/16/2020  4:00 [facility]    4/21/2020 10:00 AM Itzel Florence MD [address] [place] [address]   4/22/2020 10:30 AM DO RAYMOND Cotton [facility] SCHED   5/14/2020  4:00 PM [facility] NURSE [address] [facility] [facility]   5/19/2020 10:00 AM Itzel Florence MD [address] [place] [address]   6/11/2020  4:00 PM [facility] NURSE [address] [facility]

## 2020-03-20 ENCOUNTER — TELEPHONE (OUTPATIENT)
Dept: GYNECOLOGY | Age: 60
End: 2020-03-20

## 2020-03-20 NOTE — TELEPHONE ENCOUNTER
Called pt to give lab results reviewed by Dr. Matt Dowling drawn on 3/19/20. HGB 7.2, but pt is not symptomatic, will recheck labs 4/16/20. Encouraged pt to call with any concerns.

## 2020-03-20 NOTE — PROGRESS NOTES
Teagan Sykes,     Ms. Cathleen Tamez's Hgb is 7.2. If she is symptomatic then we need to hold her Zejula and recheck in one week. If she is not symptomatic, then we can continue and still recheck CBC in 1 week. There is a shortage of blood products, so I would not transfuse unless she is having significant issues.      Thanks,    Henrik Edward MD

## 2020-04-09 RX ORDER — SODIUM CHLORIDE 9 MG/ML
10 INJECTION INTRAMUSCULAR; INTRAVENOUS; SUBCUTANEOUS AS NEEDED
Status: CANCELLED | OUTPATIENT
Start: 2020-04-16

## 2020-04-09 RX ORDER — SODIUM CHLORIDE 0.9 % (FLUSH) 0.9 %
5-10 SYRINGE (ML) INJECTION AS NEEDED
Status: CANCELLED | OUTPATIENT
Start: 2020-04-16

## 2020-04-09 RX ORDER — HEPARIN 100 UNIT/ML
500 SYRINGE INTRAVENOUS AS NEEDED
Status: CANCELLED | OUTPATIENT
Start: 2020-04-16

## 2020-04-10 ENCOUNTER — TELEPHONE (OUTPATIENT)
Dept: GYNECOLOGY | Age: 60
End: 2020-04-10

## 2020-04-10 NOTE — TELEPHONE ENCOUNTER
Please call patient regarding her port flush and labs for 4/16/2020. She states she also has to have blood work for her nephrologist and she has had this issue before where her insurance company will not pay for 2 lab visits.   She may be reached at [phone]

## 2020-04-15 ENCOUNTER — TELEPHONE (OUTPATIENT)
Dept: GYNECOLOGY | Age: 60
End: 2020-04-15

## 2020-04-15 NOTE — TELEPHONE ENCOUNTER
Left message on am to take her other lab order from PCP to [facility] tomorrow so they can stick her once and send results to ordering physicians.

## 2020-04-16 ENCOUNTER — HOSPITAL ENCOUNTER (OUTPATIENT)
Dept: INFUSION THERAPY | Age: 60
Discharge: HOME | End: 2020-04-16
Payer: MEDICARE

## 2020-04-16 VITALS
SYSTOLIC BLOOD PRESSURE: 132 MMHG | RESPIRATION RATE: 18 BRPM | HEART RATE: 73 BPM | DIASTOLIC BLOOD PRESSURE: 77 MMHG | TEMPERATURE: 98.2 F

## 2020-04-16 LAB
ALBUMIN SERPL-MCNC: 3.4 G/DL (ref 3.5–5)
ALBUMIN/GLOB SERPL: 0.7 {RATIO} (ref 1.1–2.2)
ALP SERPL-CCNC: 103 U/L (ref 45–117)
ALT SERPL-CCNC: 26 U/L (ref 12–78)
ANION GAP SERPL CALC-SCNC: 7 MMOL/L (ref 5–15)
AST SERPL-CCNC: 34 U/L (ref 15–37)
BASOPHILS # BLD: 0 K/UL (ref 0–0.1)
BASOPHILS NFR BLD: 1 % (ref 0–1)
BILIRUB SERPL-MCNC: 0.2 MG/DL (ref 0.2–1)
BUN SERPL-MCNC: 25 MG/DL (ref 6–20)
BUN/CREAT SERPL: 18 (ref 12–20)
CALCIUM SERPL-MCNC: 8.8 MG/DL (ref 8.5–10.1)
CHLORIDE SERPL-SCNC: 114 MMOL/L (ref 97–108)
CO2 SERPL-SCNC: 20 MMOL/L (ref 21–32)
CREAT SERPL-MCNC: 1.4 MG/DL (ref 0.55–1.02)
DIFFERENTIAL METHOD BLD: ABNORMAL
EOSINOPHIL # BLD: 0.4 K/UL (ref 0–0.4)
EOSINOPHIL NFR BLD: 9 % (ref 0–7)
ERYTHROCYTE [DISTWIDTH] IN BLOOD BY AUTOMATED COUNT: 13.8 % (ref 11.5–14.5)
GLOBULIN SER CALC-MCNC: 4.7 G/DL (ref 2–4)
GLUCOSE SERPL-MCNC: 118 MG/DL (ref 65–100)
HCT VFR BLD AUTO: 24.6 % (ref 35–47)
HGB BLD-MCNC: 7.4 G/DL (ref 11.5–16)
IMM GRANULOCYTES # BLD AUTO: 0 K/UL (ref 0–0.04)
IMM GRANULOCYTES NFR BLD AUTO: 0 % (ref 0–0.5)
LYMPHOCYTES # BLD: 1 K/UL (ref 0.8–3.5)
LYMPHOCYTES NFR BLD: 23 % (ref 12–49)
MCH RBC QN AUTO: 29.5 PG (ref 26–34)
MCHC RBC AUTO-ENTMCNC: 30.1 G/DL (ref 30–36.5)
MCV RBC AUTO: 98 FL (ref 80–99)
MONOCYTES # BLD: 0.7 K/UL (ref 0–1)
MONOCYTES NFR BLD: 15 % (ref 5–13)
NEUTS SEG # BLD: 2.4 K/UL (ref 1.8–8)
NEUTS SEG NFR BLD: 52 % (ref 32–75)
NRBC # BLD: 0 K/UL (ref 0–0.01)
NRBC BLD-RTO: 0 PER 100 WBC
PLATELET # BLD AUTO: 152 K/UL (ref 150–400)
PMV BLD AUTO: 9.3 FL (ref 8.9–12.9)
POTASSIUM SERPL-SCNC: 4.6 MMOL/L (ref 3.5–5.1)
PROT SERPL-MCNC: 8.1 G/DL (ref 6.4–8.2)
RBC # BLD AUTO: 2.51 M/UL (ref 3.8–5.2)
SODIUM SERPL-SCNC: 141 MMOL/L (ref 136–145)
WBC # BLD AUTO: 4.6 K/UL (ref 3.6–11)

## 2020-04-16 PROCEDURE — 77030012965 HC NDL HUBR BBMI -A

## 2020-04-16 PROCEDURE — 85025 COMPLETE CBC W/AUTO DIFF WBC: CPT

## 2020-04-16 PROCEDURE — 80053 COMPREHEN METABOLIC PANEL: CPT

## 2020-04-16 PROCEDURE — 36591 DRAW BLOOD OFF VENOUS DEVICE: CPT

## 2020-04-16 PROCEDURE — 36415 COLL VENOUS BLD VENIPUNCTURE: CPT

## 2020-04-16 NOTE — PROGRESS NOTES
[facility] Short Note Date: 2020 Name: Romario Antonio MRN: [id number] : 1960 
 
1615 Pt admit to [facility] for port flush with labs ambulatory in stable condition. Assessment completed. No new concerns voiced. Please review pending lab results in [address] [place] [address]. Ms. Givens Staff vitals were reviewed prior to treatment. Patient Vitals for the past 12 hrs: 
 Temp Pulse Resp BP  
20 1615 98.2 °F (36.8 °C) 73 18 132/77 Port with positive blood return. Lab drawn, flushed, heparinized and de-accessed per protocol. 1625 Pt tolerated treatment well. D/c home ambulatory in no distress. Future Appointments Date Time Provider Gabriel Phillip 2020 10:00 AM Jeni Encarnacion MD [address] [place] [address]  
2020  4:00 [facility]  
2020 10:00 AM Jeni Encarnacion MD [address] [place] [address]  
2020  4:00 PM [facility] NURSE 6 [facility] Shirlene Mane RN 2020 
6:24 PM

## 2020-04-21 ENCOUNTER — VIRTUAL VISIT (OUTPATIENT)
Dept: GYNECOLOGY | Age: 60
End: 2020-04-21

## 2020-04-21 DIAGNOSIS — I27.20 PULMONARY HYPERTENSION, MILD (HCC): ICD-10-CM

## 2020-04-21 DIAGNOSIS — I10 BENIGN ESSENTIAL HYPERTENSION: ICD-10-CM

## 2020-04-21 DIAGNOSIS — Z79.4 CONTROLLED TYPE 2 DIABETES MELLITUS WITHOUT COMPLICATION, WITH LONG-TERM CURRENT USE OF INSULIN (HCC): ICD-10-CM

## 2020-04-21 DIAGNOSIS — G47.33 OSA ON CPAP: ICD-10-CM

## 2020-04-21 DIAGNOSIS — C56.9 MALIGNANT NEOPLASM OF OVARY, UNSPECIFIED LATERALITY (HCC): Primary | ICD-10-CM

## 2020-04-21 DIAGNOSIS — R22.2 MASS OF CHEST WALL: ICD-10-CM

## 2020-04-21 DIAGNOSIS — E66.01 MORBID OBESITY (HCC): ICD-10-CM

## 2020-04-21 DIAGNOSIS — R59.0 LYMPHADENOPATHY, MEDIASTINAL: ICD-10-CM

## 2020-04-21 DIAGNOSIS — Z96.0 S/P URETERAL STENT PLACEMENT: ICD-10-CM

## 2020-04-21 DIAGNOSIS — E11.9 CONTROLLED TYPE 2 DIABETES MELLITUS WITHOUT COMPLICATION, WITH LONG-TERM CURRENT USE OF INSULIN (HCC): ICD-10-CM

## 2020-04-21 DIAGNOSIS — Z99.89 OSA ON CPAP: ICD-10-CM

## 2020-04-21 NOTE — PROGRESS NOTES
Camila Anguiano is a 63 y.o. female evaluated via telephone on 2020. Consent: 
She and/or health care decision maker is aware that that she may receive a bill for this telephone service, depending on her insurance coverage, and has provided verbal consent to proceed: Yes Documentation: I communicated with the patient and/or health care decision maker about continuing her PARPi. Details of this discussion including any medical advice provided: see below. [address] [facility], [address] [place] 
 [phone]  F [phone] Office Visit Patient ID: 
Name: Camila Anguiano MRN: [id number] :  y.o. Visit date: 2020 LEONID Loja is a 63 y.o.  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 The patient's previous treatment procedures include primary Taxol/carbo with retreatment in  and Doxil/Avastin, topotecan, gemzar and now single agent weekly Taxol initiated 18 following progression with chest wall involvement. In 2018, patient did not follow up and self-discontinued her treatment regimen. She presented to the ER on 18 with SOB. Negative workup for PE or MI. Admitted to Hospitalist service from -18. Managed for acute asthma exacerbation, acute FELICIA, and hyperkalemia. The patient was initiated on Marthena Neither on 19. Underwent repeat CT C/A/P on 19 for purposes of following response to treatment. Patient was tolerating Marthena Neither well for the first month, but then had a rise in her creatinine. However, she was also found to have hydronephrosis. Underwent right ureteral stent placement on 3/18/19. Restarted Zejula afterwards, although with continued rise in creatinine to >2. Held Marthena Neither again and restarted Zejula at 100mg/daily on 19.  Presents today for follow-up and consideration of continuation of Zejula. Completed 2700 cGy of radiation to her chest wall mass on 7/11/19. Tolerating treatment well. Held for one week in 10/2019 for blood transfusion secondary to anemia. Normal iron and B12 labs. Her Ca-125 has bounced around, but has been steadily declining recently. CT C/A/P on 10/2/19 with improvement in her disease. The pain at her chest wall mass is much improved. Otherwise the patient is really without complaints. She remains on Zejula. She is on UTI suppression with her Urologist as she has had multiple UTIs with her ureteral stent in place. Denies change in appetite or bowel habits. Denies vaginal bleeding, hematuria, hematochezia, constipation, diarrhea, nausea, vomiting, CP, SOB, fevers, or chills. CT C/A/P on 10/2/19 with improvement in her disease. Had ureteral stent exchange on 10/31/19.  
   
OncTx History: 
9/21/12: MARAH/BSO/Cytoreductive surgery on 9/21/12 noted carcinomatosis/omental caking. 10/2012-2/2013: 6 cycles Carbo/Taxol 8/2014-11/2014: 6 Cycles carbo/Taxol 3/2015-5/13/15: 3 cycles Avastin/Doxil until progression 7/2015-8/2015 Weekly Topotecan  
12/2015-3/2016: Weekly Topotecan 2/2017-9/2017Mellie Spies D1/D8 Q28 days for 6 cycles 4/2018: CT core needle bx chest wall mass: Metastatic high-grade serous carcinoma (see comment) General Categorization Positive for malignancy. 5/8/2018-Present: Taxol  R8,4,34; S/P  Cycle #4  8/14/2018. Patient discontinued treatment secondary to side effects and fatigue. 1/23/2019 - Initiated Jacqualin Gave. Held do to rising creatinine. However, she was also found to have hydronephrosis. Underwent right ureteral stent placement on 3/18/19. Imaging Review:  
CT C/A/P 10/2/19:  
FINDINGS: 
  
THYROID: Visualized gland is unremarkable. MEDIASTINUM: Upper left paratracheal node measures 2.2 x 3.4 cm (2, 8), 
previously 2.3 x 3.5 cm.  Superior prevascular node measures 2.9 x 3.0 cm, 
 previously 3.5 x 3.8 cm. Poorly delineated subcarinal node measures about 3.4 x 
4.8 cm, previously 3.6 x 4.8 cm. Precardiac nodes measure 1.9 x 2.5 cm and 1.7 x 
2.4 cm (2, 39), previously 2.1 x 2.7 cm and 2.0 x 2.0 cm. SWATI: No mass or lymphadenopathy. THORACIC AORTA: No dissection or aneurysm. MAIN PULMONARY ARTERY: Normal in caliber. TRACHEA/BRONCHI: Patent. ESOPHAGUS: No wall thickening or dilatation. HEART: Normal in size. PLEURA: No effusion or pneumothorax. LUNGS: No nodule, mass, or airspace disease. LIVER: Multiple hypodense lesions suspicious for metastases, measuring up to 2.5 
x 4.0 cm in segment 2 (2, 43) series, previously 2.8 x 3.7 cm, 3.2 x 4.6 cm in 
segment 3 (2, 55), previously 3.1 x 4.5 cm, and partially exophytic from the 
posterior inferior right lobe segment 6 measuring 2.5 x 3.5 cm, previously 2.8 x 
3.4 cm. GALLBLADDER: Unremarkable. SPLEEN: Central 1.9 x 3.1 cm hypodensity (2, 47), previously 1.9 x 2.3 cm. Otherwise unremarkable. PANCREAS: Inseparable from bulky central abdominal adenopathy with no identified 
ductal dilation. ADRENALS: Unremarkable. KIDNEYS: Right hydronephrosis has improved status post right nephroureteral 
stent placement. Kidneys otherwise appear unremarkable. STOMACH: Unremarkable. SMALL BOWEL: No dilatation or wall thickening. COLON: No dilation or wall thickening. APPENDIX: Unremarkable. PERITONEUM: Extensive peritoneal implants with representative largest lesions 
measuring 4.4 x 6.1 cm and the left adnexal region (2, 98), previously 4.3 x 5.7 
cm, 3.8 x 5.9 cm in the anterior midline (2, 81), previously 3.4 x 5.4 cm, and 
2.8 x 4.7 cm in the right abdomen (2, 78), previously 3.2 x 4.7 cm. RETROPERITONEUM: Extensive rocio hepatis, retroperitoneal, and pelvic sidewall 
adenopathy. Conglomerate nkechi mass in the rocio hepatis region measures 5.7 x 
11.1 cm (2, 58) is, previously 6.3 x 12.2 cm. Aortocaval node measures 3.7 x 5.3 cm (2, 77), previously 4.5 x 5.4 cm. Right pelvic sidewall node measures 2.9 x 
4.2 cm (2, 103), previously 3.4 x 4.2 cm. Left external iliac node measures 3.0 
x 4.8 cm (2, 101), previously 3.3 x 4.8 cm. REPRODUCTIVE ORGANS: Uterus and ovaries are surgically absent. URINARY BLADDER: No mass or calculus. Distal end of right nephroureteral stent 
positioned within bladder lumen. BONES: Degenerative spine change. No acute fracture or aggressive lesion. ADDITIONAL COMMENTS: Mass centered at the right sternocostal junction measures 5.3 x 5.4 cm (2, 21), previously 4.8 x 5.7 cm. Post surgical changes in the 
anterior abdominal wall. Left Port-A-Cath in place. Bilateral inguinal 
adenopathy measuring 2.1 x 3.2 cm on the right (2, 115), previously 2.3 x 3.7 cm 
and 2.7 x 2.8 cm on the left (2, 108), previously 2.9 x 3.1 cm. [place] IMPRESSION IMPRESSION: Overall response to disease with either decrease in size or no 
significant change in extensive nkechi metastases, hepatic metastases, right 
sternal metastasis, and peritoneal carcinomatosis as above. No new metastasis or 
progressive metastasis identified. Status post right nephroureteral stent with 
interval resolution of right hydronephrosis. CT C/A/P 2/5/19: 
FINDINGS:  
THYROID: No nodule. MEDIASTINUM: Mediastinal lymphadenopathy is unchanged. A reference superior 
mediastinal lymph node is stable measuring 3.3 cm. SWATI: No mass or lymphadenopathy. THORACIC AORTA: No dissection or aneurysm. MAIN PULMONARY ARTERY: Normal in caliber. TRACHEA/BRONCHI: Patent. ESOPHAGUS: No wall thickening or dilatation. HEART: Normal in size. PLEURA: No effusion or pneumothorax. LUNGS: No nodule, mass, or airspace disease. LIVER: Evaluation of the liver is limited by lack of intravenous contrast. There 
is a vague 4.4 cm hypodense lesion in the left hepatic lobe previously measuring 3.0 cm. Lesion in the right hepatic lobe now measures 3.3 cm, previously 
measuring 2.7 cm. GALLBLADDER: Unremarkable. SPLEEN: Central soft tissue abnormality has increased in size, now measuring 4.9 
cm, previously measuring 3.1 cm. PANCREAS: Not well evaluated given the massive lymphadenopathy and lack of 
intravenous contrast. 
ADRENALS: Unremarkable. KIDNEYS: There is no moderate right hydronephrosis. STOMACH: Unremarkable. SMALL BOWEL: No dilatation or wall thickening. COLON: No dilatation or wall thickening. APPENDIX: Not visualized. PERITONEUM: Bulky gastrohepatic, periceliac, portacaval, and mesenteric 
lymphadenopathy has increased. There is secondary invasion of the liver. Maximum 
dimension in the upper abdomen is currently 10.4 cm, previously 9.3 cm. Elemental soft tissue nodules have increased in size, with a reference 7.0 cm 
nodule in the left abdomen previously measuring 5.5 cm. Soft tissue nodule in 
the deep pelvis between the bladder and colon has increased in size as well. RETROPERITONEUM: No lymphadenopathy or aortic aneurysm. REPRODUCTIVE ORGANS: The uterus and ovaries are surgically absent. URINARY BLADDER: No mass or calculus. BONES: Soft tissue mass involving the right sternum has not changed. Degenerative changes are seen in the thoracic and lumbar spine. ADDITIONAL COMMENTS: N/A IMPRESSION IMPRESSION: 
Stable mediastinal lymphadenopathy. Progressive abdominal lymphadenopathy as 
described above. Liver lesions have increased in size compared to the prior 
exam. Stable right sternal mass. Moderate right hydroureteronephrosis is now 
noted which appears to be related to the lymphadenopathy. ROS Constitutional: no weight loss, fever, night sweats Respiratory: no cough, shortness of breath, or wheezing Cardiovascular: no chest pain or dyspnea on exertion. Reports chest wall mass smaller, no associated pain. Heme: No abnormal bleeding Gastrointestinal: no abdominal pain, change in bowel habits, or black or bloody stools Genito-Urinary: no dysuria, trouble voiding, or hematuria Musculoskeletal: + swelling in ankle - bilateral, mostly only at end of day Neurological: mild neuropathy Derm: pigmentation/induration medial left leg. Prior injury from fall. Psych: positive for depression - controlled PMH: 
Past Medical History:  
Diagnosis Date  Adverse effect of anesthesia   
 sleep apnea cpap machine useage  Anemia  Arthritis DDD low back and knees  Asthma   
 uses albuterol prn only; none x 6 mos. Never hospitalized  BRCA negative 2013  Calculus of kidney  Chronic kidney disease   
 kidney stones  Chronic pain   
 knee pain bilat  CINV (chemotherapy-induced nausea and vomiting) 2014  Decreased hearing, right PATIENT STATES THIS IS DUE TO CHEMOTHERAPY  Diabetes ([address] Age 46 IDDM  Environmental allergies  Fibromyalgia  GERD (gastroesophageal reflux disease)   
 controlled with med  Hx of pulmonary embolus during pregnancy 2015  Hydronephrosis due to obstruction of ureter 3/18/2019  Hypertension  Ill-defined condition Sepsis  -  SOURCE PORT  
 Ill-defined condition 2016  
 chemotherapy  Mass of chest wall 2018  Morbid obesity ([address]  Murmur, heart  Other and unspecified hyperlipidemia  Ovarian cancer ([address] 2012, 2014  
 serous, high grade, stage IIIc. s/p chemotx (has port)  Psychiatric disorder Depression/Anxiety  Rectal bleeding  Thromboembolus ([address]  POST PARTUM; resolved- PELVIS  Thyroid disease HYPOTHYROID  Unspecified sleep apnea CPAP, Dr. Dhiraj Meraz PSH: 
Past Surgical History:  
Procedure Laterality Date  BREAST SURGERY PROCEDURE UNLISTED Lymph node in left breast 2017 [place]  
  X2  
 HX HYSTERECTOMY  2012 ROULA/BSO, tumor debulking, omentectomy for ovarian cancer  HX ORTHOPAEDIC Right   
 ankle-FX, R  
  HX ORTHOPAEDIC Right 1980'S  
 FX R ARM  
 HX UROLOGICAL Right 2017 STENT FOR KIDNEY STONES  
 HX VASCULAR ACCESS  11/4/2015  
 right chest- port placed  HX VASCULAR ACCESS Left 2017 [facility]ETE CATH  
 
SOC: 
Social History Socioeconomic History  Marital status:  Spouse name: Not on file  Number of children: Not on file  Years of education: Not on file  Highest education level: Not on file Occupational History  Not on file Social Needs  Financial resource strain: Not on file  Food insecurity Worry: Not on file Inability: Not on file  Transportation needs Medical: Not on file Non-medical: Not on file Tobacco Use  Smoking status: Never Smoker  Smokeless tobacco: Never Used Substance and Sexual Activity  Alcohol use: Yes Comment: 1 drink per month  Drug use: No  
 Sexual activity: Yes Lifestyle  Physical activity Days per week: Not on file Minutes per session: Not on file  Stress: Not on file Relationships  Social connections Talks on phone: Not on file Gets together: Not on file Attends Orthodoxy service: Not on file Active member of club or organization: Not on file Attends meetings of clubs or organizations: Not on file Relationship status: Not on file  Intimate partner violence Fear of current or ex partner: Not on file Emotionally abused: Not on file Physically abused: Not on file Forced sexual activity: Not on file Other Topics Concern  Not on file Social History Narrative Lives in [facility] alone.  passed away in 5/16 of a heart attack. Has 2 sons. Disability. 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 Enjoys swimming and going to the gym. Family History Family History Problem Relation Age of Onset  Hypertension Mother Duffy Arthritis-osteo Mother  Hypertension Father  Arthritis-osteo Father  Cancer Father PROSTATE  COPD Father  Hypertension Brother  Elevated Lipids Brother  Arthritis-osteo Brother  Hypertension Brother  Elevated Lipids Brother  Obesity Brother  Cancer Brother PROSTATE  Anesth Problems Son DELAYED AWAKENING  
 Diabetes Maternal Grandmother  Anesth Problems Paternal Grandmother PATIENT STATES GRANDMOTHER'S HEART STOPPED DURING SURGERY Medications: (reviewed) Current Outpatient Medications on File Prior to Visit Medication Sig Dispense Refill  fluticasone propionate (FLONASE) 50 mcg/actuation nasal spray 2 SPRAYS IN BOTH NOSTRILS DAILY 1 Bottle 0  
 omega-3 acid ethyl esters (LOVAZA) 1 gram capsule TAKE 1 CAPSULE BY MOUTH TWICE A  Cap 2  cycloSPORINE (RESTASIS) 0.05 % dpet  albuterol (PROVENTIL VENTOLIN) 2.5 mg /3 mL (0.083 %) nebu Take 2.5 mg by inhalation.  dextroamphetamine-amphetamine (ADDERALL) 20 mg tablet TAKE 1 TABLET BY MOUTH EVERY DAY  0  
 rosuvastatin (CRESTOR) 10 mg tablet TAKE 1 TABLET BY MOUTH EVERY DAY  0  
 telmisartan (MICARDIS) 20 mg tablet TAKE 1 TABLET BY MOUTH EVERY DAY 90 Tab 3  
 atorvastatin (LIPITOR) 20 mg tablet  BD ULTRA-FINE SHORT PEN NEEDLE 31 gauge x 5/16\" ndle USE TO INJECT INSULIN 5 TIMES DAILY 200 Pen Needle 11  
 insulin aspart U-100 (NOVOLOG FLEXPEN U-100 INSULIN) 100 unit/mL (3 mL) inpn INJECT 20 UNITS BEFORE EACH MEAL + 4 UNITS FOR EVERY 50 MG/DL ABOVE 150 MG/DL.  UNITS PER DAY 90 Adjustable Dose Pre-filled Pen Syringe 3  
 LEVEMIR FLEXTOUCH U-100 INSULN 100 unit/mL (3 mL) inpn INJECT 30 UNITS IN AM AND 50 UNITS AT NIGHT. INCREASE AS DIRECTED TO  UNITS/DAY. 90 Adjustable Dose Pre-filled Pen Syringe 3  DEXILANT 60 mg CpDB capsule (delayed release) TAKE 1 CAPSULE BY MOUTH EVERY DAY 90 Cap 3  
 LINZESS 145 mcg cap capsule TAKE 1 CAPSULE BY MOUTH EVERY DAYY 30 Cap 0  
 ondansetron (ZOFRAN ODT) 4 mg disintegrating tablet Take 1 Tab by mouth every eight (8) hours as needed for Nausea. Indications: Nausea and Vomiting caused by Cancer Drugs 30 Tab 2  
 levothyroxine (SYNTHROID) 150 mcg tablet TAKE 1 TABLET BY MOUTH EVERY DAY BEFORE BREAKFAST 90 Tab 0  
 EPINEPHrine (EPIPEN) 0.3 mg/0.3 mL injection INJECT 0.3 ML BY INTRAMUSCULAR ROUTE ONCE AS NEEDED FOR UP TO 1 DOSE.  1  
 ZEJULA 100 mg cap Take 100 mg by mouth two (2) times a day.  glucose blood VI test strips (TRUE METRIX GLUCOSE TEST STRIP) strip by Does Not Apply route See Admin Instructions. 30 Strip 5  
 lancets misc by Does Not Apply route daily. True Metrix lancets- test blood sugar once per day 30 Each 5  Blood-Glucose Meter monitoring kit Check up to tid, as directed 1 Kit 0  
 azelastine (ASTELIN) 137 mcg (0.1 %) nasal spray 1 [place] by Both Nostrils route daily as needed. Use in each nostril as directed  insulin detemir U-100 (LEVEMIR) 100 unit/mL injection 20 Units by SubCUTAneous route daily (with lunch).  montelukast (SINGULAIR) 10 mg tablet Take 10 mg by mouth nightly.  ascorbic acid, vitamin C, (VITAMIN C) 1,000 mg tablet Take 1,000 mg by mouth daily.  albuterol (PROVENTIL VENTOLIN) 2.5 mg /3 mL (0.083 %) nebulizer solution 2.5 mg by Nebulization route every four (4) hours as needed for Wheezing.  Liraglutide (VICTOZA) 0.6 mg/0.1 mL (18 mg/3 mL) pnij 1.8 mg by SubCUTAneous route daily (with lunch). 3 Pen 3  
 SYMBICORT 160-4.5 mcg/actuation HFAA Take 2 Puffs by inhalation two (2) times daily as needed. 2 Inhaler 3  clonazePAM (KLONOPIN) 0.5 mg tablet Take 0.5 mg by mouth nightly as needed.  sertraline (ZOLOFT) 50 mg tablet TAKE 1 TABLET BY MOUTH as needed  1  cholecalciferol, VITAMIN D3, (VITAMIN D3) 5,000 unit tab tablet Take 5,000 Units by mouth daily.  furosemide (LASIX) 40 mg tablet Take 40 mg by mouth every other day. 3  
 amoxicillin-clavulanate (AUGMENTIN) 875-125 mg per tablet No current facility-administered medications on file prior to visit. Allergies: (reviewed) Allergies Allergen Reactions  Carboplatin Other (comments) Patient developed shortness of breath, felt faint, coughing, skin flushed and \"itchy\". This occurred on the patients 8th treatment.  Iodinated Contrast Media Rash 13 hr pre-medication prior to IV contrast.  
Patient has done well with 1 hr pre-medication of (Solu-Medrol) 40mg &  (Benadryl) 50mg.  Lipitor [Atorvastatin] Myalgia  Shellfish Containing Products Hives  Tape [Adhesive] Itching and Other (comments) \"op site-- clear,thin tape\"--caused blister  Tomato Hives  Iodine And Iodide Containing Products Rash  Olmesartan Other (comments)  Losartan Other (comments)  
  headaches  Percocet [Oxycodone-Acetaminophen] Other (comments) Fever; however, current home med OBJECTIVE Physical Exam 
Visit Vitals LMP 09/07/2011 *deferred today given video-conference visit for ongoing COVID-19 pandemic* Physical Exam: 
General: Alert and oriented. No acute distress. Well-nourished HEENT: No thyroid enlargment. Neck supple without restrictions. Sclera normal. Normal occular motion. Moist mucous membranes. Lymphatics: No evidence of axillary, cervical, or subclavicular adenopathy. Respiratory: clear to auscultation and percussion to the bases. No CVAT. Chest wall mass is still palpable along the superior sternum at the level of the superior aspect of the breasts, but improved. Normal overlying skin Cardiovascular: regular rate and rhythm. No murmurs, rubs, or gallops. Gastrointestinal: soft, non-tender, non-distended, no masses or organomegaly. Well-healed incision. Musculoskeletal: normal gait. No joint tenderness, deformity or swelling. No muscular tenderness. Extremities: extremities normal, atraumatic, mild 1+ edema bilaterally. Pelvic: deferred today Neuro: Grossly intact. Normal gait and movement. No acute deficit Skin: No evidence of rashes or skin changes. DATE REVIEW as available: 
Lab Results Component Value Date/Time WBC 4.6 04/16/2020 04:17 PM  
 Hemoglobin (POC) 10.5 (L) 03/20/2017 12:15 PM  
 HGB 7.4 (L) 04/16/2020 04:17 PM  
 Hematocrit (POC) 31 (L) 03/20/2017 12:15 PM  
 HCT 24.6 (L) 04/16/2020 04:17 PM  
 PLATELET 672 01/52/2864 04:17 PM  
 MCV 98.0 04/16/2020 04:17 PM  
 
Lab Results Component Value Date/Time  
 ABS. NEUTROPHILS 2.4 04/16/2020 04:17 PM  
 
Lab Results Component Value Date/Time Sodium 141 04/16/2020 04:17 PM  
 Potassium 4.6 04/16/2020 04:17 PM  
 Chloride 114 (H) 04/16/2020 04:17 PM  
 CO2 20 (L) 04/16/2020 04:17 PM  
 Anion gap 7 04/16/2020 04:17 PM  
 Glucose 118 (H) 04/16/2020 04:17 PM  
 BUN 25 (H) 04/16/2020 04:17 PM  
 Creatinine 1.40 (H) 04/16/2020 04:17 PM  
 BUN/Creatinine ratio 18 04/16/2020 04:17 PM  
 GFR est AA 47 (L) 04/16/2020 04:17 PM  
 GFR est non-AA 39 (L) 04/16/2020 04:17 PM  
 Calcium 8.8 04/16/2020 04:17 PM  
 Bilirubin, total 0.2 04/16/2020 04:17 PM  
 AST (SGOT) 34 04/16/2020 04:17 PM  
 Alk. phosphatase 103 04/16/2020 04:17 PM  
 Protein, total 8.1 04/16/2020 04:17 PM  
 Albumin 3.4 (L) 04/16/2020 04:17 PM  
 Globulin 4.7 (H) 04/16/2020 04:17 PM  
 A-G Ratio 0.7 (L) 04/16/2020 04:17 PM  
 ALT (SGPT) 26 04/16/2020 04:17 PM  
 
 
ECHO 7/17/18 Left ventricle: Systolic function was normal. Ejection fraction was estimated in the range of 55 % to 60 %. There were no regional wall motion 
abnormalities. Wall thickness was mildly increased. Left atrium: The atrium was mildly dilated. Mitral valve: There was mild regurgitation. Aortic valve: Leaflets exhibited sclerosis without stenosis. Not well visualized. IMPRESSION AND PLAN: 
Ms. Ruchi Rogers is a 63 y.o. female with recurrent, metastatic ovarian cancer. Heavily pre-treated.  Lost to follow-up since 9/2018, at which time patient self-discontinued weekly paclitaxel. Initiated Jessica Lisa on 1/23/19. Held after 1 month secondary to rising creatinine. Found to have right hydronephrosis. Right ureteral stent placed 3/18/19 with normalization of creatinine. Completed 2700 cGy to the sternun 7/11/19. Restarted Zejula at 100mg/daily on 4/23/19. ECOG 1 Problem List Items Addressed This Visit Circulatory Benign essential hypertension Pulmonary hypertension, mild ([address] Endocrine Controlled type 2 diabetes mellitus without complication, with long-term current use of insulin ([address] Immune Lymphadenopathy, mediastinal  
  
 Reproductive Ovarian cancer ([address] - Primary Respiratory SHABANA on CPAP Other Morbid obesity ([address] Mass of chest wall S/P ureteral stent placement Reviewed patient's course to date. Tolerating Zejula 100mg/daily. Creatinine stable. Ca-125 continues to trend down/stable. CT C/A/P on 10/2/19 with improvement and stability of disease burden. Stable Hgb. Normal iron and B12 studies. She is tolerating treatment well. Will plan to continue treatment. RTC in 1 month for consideration of next cycle. For some reason her Ca-125 was not drawn with her labs this time. We will need to follow this. If she develops worsening symptoms or her Ca-125 trends upwards, then we will plan for a CT C/A/P at that time. Again noted that while the patient is BRCA negative, there is some evidence that PARPi may provide some benefit to BRCA wild-type patients. Previously I discussed goals of care, including that any type of treatment at this time is palliative in nature. I have reviewed her chemotherapy history, which is extensive; although the patient has been somewhat non-compliant with treatments with missed treatments \"here and there\". Stressed importance of remaining on treatment.  Previously discussed that palliative therapy at this time has a response rate at best of 5-10% regardless of the type of treatment. Urology is managing her stent and recurrent UTIs. She is on antibiotic suppression given the continued recurrence of her UTI. All questions and concerns were addressed with the patient and she is comfortable with the plan. Polly Mead MD 
 
I affirm this is a Patient Initiated Episode with an Established Patient who has not had a related appointment within my department in the past 7 days or scheduled within the next 24 hours. Total Time: minutes: 11-20 minutes Note: not billable if this call serves to triage the patient into an appointment for the relevant concern Polly Mead MD

## 2020-05-13 NOTE — TELEPHONE ENCOUNTER
Requested Prescriptions     Signed Prescriptions Disp Refills    Zejula 100 mg cap 60 Cap 4     Sig: Take 100 mg by mouth daily. Authorizing Provider: Valencia Beasley     Ordering User: Denise Kelly     Rx faxed per messi Vaz.

## 2020-05-14 NOTE — PROGRESS NOTES
[facility] Short Visit Progress Note    1600 Pt admit to [facility] for port flush/labs ambulatory in stable condition. Assessment completed. No new concerns voiced. Please review pending lab results in [address] [place] [address]. Patient Vitals for the past 12 hrs:   Temp Pulse Resp BP   05/14/20 1601 97.7 °F (36.5 °C) 70 16 133/78       Port accessed with positive blood return. Labs drawn, flushed, heparinized and de-accessed per protocol. Medications Administered     0.9% sodium chloride injection 10 mL     Admin Date  05/14/2020 Action  Given Dose  10 mL Route  IntraVENous Administered By  Jhon Cisneros RN          heparin (porcine) pf 500 Units     Admin Date  05/14/2020 Action  Given Dose  500 Units Route  IntraVENous Administered By  Jhon Cisneros RN          sodium chloride (NS) flush 5-10 mL     Admin Date  05/14/2020 Action  Given Dose  10 mL Route  IntraVENous Administered By  Jhon Cisneros, NIKIA                  [id number] Pt tolerated treatment well. D/c home ambulatory in no distress. Pt aware of next appointment scheduled for 06/11/2020.

## 2020-05-19 NOTE — PROGRESS NOTES
Melissa Swartz is a 63 y.o. female who was seen by synchronous (real-time) audio-video technology on 2020 [address], [address] [facility], [address] 
P [phone]  F [phone] Office Visit Patient ID: 
Name: Melissa Swartz MRN: [id number] : 1960/59 y.o. Visit date: 2020 LEONID Archer is a 63 y.o.  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 The patient's previous treatment procedures include primary Taxol/carbo with retreatment in  and Doxil/Avastin, topotecan, gemzar and now single agent weekly Taxol initiated 18 following progression with chest wall involvement. In 2018, patient did not follow up and self-discontinued her treatment regimen. She presented to the ER on 18 with SOB. Negative workup for PE or MI. Admitted to Hospitalist service from -18. Managed for acute asthma exacerbation, acute FELICIA, and hyperkalemia. The patient was initiated on Margreta Flowers on 19. Underwent repeat CT C/A/P on 19 for purposes of following response to treatment. Patient was tolerating Margreta Flowers well for the first month, but then had a rise in her creatinine. However, she was also found to have hydronephrosis. Underwent right ureteral stent placement on 3/18/19. Restarted Zejula afterwards, although with continued rise in creatinine to >2. Held Margreta Flowers again and restarted Zejula at 100mg/daily on 19. Presents today for follow-up and consideration of continuation of Margreta Flowers. Completed 2700 cGy of radiation to her chest wall mass on 19. Tolerating treatment well. Held for one week in 10/2019 for blood transfusion secondary to anemia. Normal iron and B12 labs. Her Ca-125 has bounced around, but has been steadily declining recently. CT C/A/P on 10/2/19 with improvement in her disease. The pain at her chest wall mass is much improved. Otherwise the patient is really without complaints. She remains on Zejula. She is on UTI suppression with her Urologist as she has had multiple UTIs with her ureteral stent in place. She was recently treated for a UTI and is having some hematuria. She is scheduled to see her Urologist soon. She reports some cramping in the lower abdomen which is new. Denies change in appetite or bowel habits. Denies vaginal bleeding, hematuria, hematochezia, constipation, diarrhea, nausea, vomiting, CP, SOB, fevers, or chills. CT C/A/P on 10/2/19 with improvement in her disease. Had ureteral stent exchange on 10/31/19.  
   
OncTx History: 
9/21/12: MARAH/BSO/Cytoreductive surgery on 9/21/12 noted carcinomatosis/omental caking. 10/2012-2/2013: 6 cycles Carbo/Taxol 8/2014-11/2014: 6 Cycles carbo/Taxol 3/2015-5/13/15: 3 cycles Avastin/Doxil until progression 7/2015-8/2015 Weekly Topotecan  
12/2015-3/2016: Weekly Topotecan 2/2017-9/2017Ryland Cristiane D1/D8 Q28 days for 6 cycles 4/2018: CT core needle bx chest wall mass: Metastatic high-grade serous carcinoma (see comment) General Categorization Positive for malignancy. 5/8/2018-Present: Taxol  S8,9,28; S/P  Cycle #4  8/14/2018. Patient discontinued treatment secondary to side effects and fatigue. 1/23/2019 - Initiated Saloni Jungling. Held do to rising creatinine. However, she was also found to have hydronephrosis. Underwent right ureteral stent placement on 3/18/19. Imaging Review:  
CT C/A/P 10/2/19:  
FINDINGS: 
  
THYROID: Visualized gland is unremarkable. MEDIASTINUM: Upper left paratracheal node measures 2.2 x 3.4 cm (2, 8), 
previously 2.3 x 3.5 cm. Superior prevascular node measures 2.9 x 3.0 cm, 
previously 3.5 x 3.8 cm. Poorly delineated subcarinal node measures about 3.4 x 
4.8 cm, previously 3.6 x 4.8 cm. Precardiac nodes measure 1.9 x 2.5 cm and 1.7 x 
2.4 cm (2, 39), previously 2.1 x 2.7 cm and 2.0 x 2.0 cm. SWATI: No mass or lymphadenopathy. THORACIC AORTA: No dissection or aneurysm. MAIN PULMONARY ARTERY: Normal in caliber. TRACHEA/BRONCHI: Patent. ESOPHAGUS: No wall thickening or dilatation. HEART: Normal in size. PLEURA: No effusion or pneumothorax. LUNGS: No nodule, mass, or airspace disease. LIVER: Multiple hypodense lesions suspicious for metastases, measuring up to 2.5 
x 4.0 cm in segment 2 (2, 43) series, previously 2.8 x 3.7 cm, 3.2 x 4.6 cm in 
segment 3 (2, 55), previously 3.1 x 4.5 cm, and partially exophytic from the 
posterior inferior right lobe segment 6 measuring 2.5 x 3.5 cm, previously 2.8 x 
3.4 cm. GALLBLADDER: Unremarkable. SPLEEN: Central 1.9 x 3.1 cm hypodensity (2, 47), previously 1.9 x 2.3 cm. Otherwise unremarkable. PANCREAS: Inseparable from bulky central abdominal adenopathy with no identified 
ductal dilation. ADRENALS: Unremarkable. KIDNEYS: Right hydronephrosis has improved status post right nephroureteral 
stent placement. Kidneys otherwise appear unremarkable. STOMACH: Unremarkable. SMALL BOWEL: No dilatation or wall thickening. COLON: No dilation or wall thickening. APPENDIX: Unremarkable. PERITONEUM: Extensive peritoneal implants with representative largest lesions 
measuring 4.4 x 6.1 cm and the left adnexal region (2, 98), previously 4.3 x 5.7 
cm, 3.8 x 5.9 cm in the anterior midline (2, 81), previously 3.4 x 5.4 cm, and 
2.8 x 4.7 cm in the right abdomen (2, 78), previously 3.2 x 4.7 cm. RETROPERITONEUM: Extensive rocio hepatis, retroperitoneal, and pelvic sidewall 
adenopathy. Conglomerate nkechi mass in the rocio hepatis region measures 5.7 x 
11.1 cm (2, 58) is, previously 6.3 x 12.2 cm. Aortocaval node measures 3.7 x 5.3 
cm (2, 77), previously 4.5 x 5.4 cm. Right pelvic sidewall node measures 2.9 x 
4.2 cm (2, 103), previously 3.4 x 4.2 cm. Left external iliac node measures 3.0 
x 4.8 cm (2, 101), previously 3.3 x 4.8 cm. REPRODUCTIVE ORGANS: Uterus and ovaries are surgically absent. URINARY BLADDER: No mass or calculus. Distal end of right nephroureteral stent 
positioned within bladder lumen. BONES: Degenerative spine change. No acute fracture or aggressive lesion. ADDITIONAL COMMENTS: Mass centered at the right sternocostal junction measures 5.3 x 5.4 cm (2, 21), previously 4.8 x 5.7 cm. Post surgical changes in the 
anterior abdominal wall. Left Port-A-Cath in place. Bilateral inguinal 
adenopathy measuring 2.1 x 3.2 cm on the right (2, 115), previously 2.3 x 3.7 cm 
and 2.7 x 2.8 cm on the left (2, 108), previously 2.9 x 3.1 cm. Breanna Kincaid IMPRESSION IMPRESSION: Overall response to disease with either decrease in size or no 
significant change in extensive nkechi metastases, hepatic metastases, right 
sternal metastasis, and peritoneal carcinomatosis as above. No new metastasis or 
progressive metastasis identified. Status post right nephroureteral stent with 
interval resolution of right hydronephrosis. CT C/A/P 2/5/19: 
FINDINGS:  
THYROID: No nodule. MEDIASTINUM: Mediastinal lymphadenopathy is unchanged. A reference superior 
mediastinal lymph node is stable measuring 3.3 cm. SWATI: No mass or lymphadenopathy. THORACIC AORTA: No dissection or aneurysm. MAIN PULMONARY ARTERY: Normal in caliber. TRACHEA/BRONCHI: Patent. ESOPHAGUS: No wall thickening or dilatation. HEART: Normal in size. PLEURA: No effusion or pneumothorax. LUNGS: No nodule, mass, or airspace disease. LIVER: Evaluation of the liver is limited by lack of intravenous contrast. There 
is a vague 4.4 cm hypodense lesion in the left hepatic lobe previously measuring 3.0 cm. Lesion in the right hepatic lobe now measures 3.3 cm, previously 
measuring 2.7 cm. GALLBLADDER: Unremarkable. SPLEEN: Central soft tissue abnormality has increased in size, now measuring 4.9 
cm, previously measuring 3.1 cm. PANCREAS: Not well evaluated given the massive lymphadenopathy and lack of 
intravenous contrast. 
ADRENALS: Unremarkable. KIDNEYS: There is no moderate right hydronephrosis. STOMACH: Unremarkable. SMALL BOWEL: No dilatation or wall thickening. COLON: No dilatation or wall thickening. APPENDIX: Not visualized. PERITONEUM: Bulky gastrohepatic, periceliac, portacaval, and mesenteric 
lymphadenopathy has increased. There is secondary invasion of the liver. Maximum 
dimension in the upper abdomen is currently 10.4 cm, previously 9.3 cm. Elemental soft tissue nodules have increased in size, with a reference 7.0 cm 
nodule in the left abdomen previously measuring 5.5 cm. Soft tissue nodule in 
the deep pelvis between the bladder and colon has increased in size as well. RETROPERITONEUM: No lymphadenopathy or aortic aneurysm. REPRODUCTIVE ORGANS: The uterus and ovaries are surgically absent. URINARY BLADDER: No mass or calculus. BONES: Soft tissue mass involving the right sternum has not changed. Degenerative changes are seen in the thoracic and lumbar spine. ADDITIONAL COMMENTS: N/A IMPRESSION IMPRESSION: 
Stable mediastinal lymphadenopathy. Progressive abdominal lymphadenopathy as 
described above. Liver lesions have increased in size compared to the prior 
exam. Stable right sternal mass. Moderate right hydroureteronephrosis is now 
noted which appears to be related to the lymphadenopathy. ROS Constitutional: no weight loss, fever, night sweats Respiratory: no cough, shortness of breath, or wheezing Cardiovascular: no chest pain or dyspnea on exertion. Reports chest wall mass smaller, no associated pain. Heme: No abnormal bleeding Gastrointestinal: no abdominal pain, change in bowel habits, or black or bloody stools Genito-Urinary: no dysuria, trouble voiding, or hematuria Musculoskeletal: + swelling in ankle - bilateral, mostly only at end of day Neurological: mild neuropathy Derm: pigmentation/induration medial left leg. Prior injury from fall. Psych: positive for depression - controlled PMH: 
Past Medical History:  
Diagnosis Date  Adverse effect of anesthesia   
 sleep apnea cpap machine useage  Anemia  Arthritis DDD low back and knees  Asthma   
 uses albuterol prn only; none x 6 mos. Never hospitalized  BRCA negative 2013  Calculus of kidney  Chronic kidney disease   
 kidney stones  Chronic pain   
 knee pain bilat  CINV (chemotherapy-induced nausea and vomiting) 2014  Decreased hearing, right PATIENT STATES THIS IS DUE TO CHEMOTHERAPY  Diabetes ([address] Age 46 IDDM  Environmental allergies  Fibromyalgia  GERD (gastroesophageal reflux disease)   
 controlled with med  Hx of pulmonary embolus during pregnancy 2015  Hydronephrosis due to obstruction of ureter 3/18/2019  Hypertension  Ill-defined condition Sepsis  -  SOURCE PORT  
 Ill-defined condition 2016  
 chemotherapy  Mass of chest wall 2018  Morbid obesity ([address]  Murmur, heart  Other and unspecified hyperlipidemia  Ovarian cancer ([address] 2012, 2014  
 serous, high grade, stage IIIc. s/p chemotx (has port)  Psychiatric disorder Depression/Anxiety  Rectal bleeding  Thromboembolus ([address]  POST PARTUM; resolved- PELVIS  Thyroid disease HYPOTHYROID  Unspecified sleep apnea CPAP, Dr. Dejuan Mukherjee PSH: 
Past Surgical History:  
Procedure Laterality Date  BREAST SURGERY PROCEDURE UNLISTED Lymph node in left breast 2017 [place]  
  X2  
 HX HYSTERECTOMY  2012 ROULA/BSO, tumor debulking, omentectomy for ovarian cancer  HX ORTHOPAEDIC Right   
 ankle-FX, R  
 HX ORTHOPAEDIC Right   
 FX R ARM  
 HX UROLOGICAL Right  STENT FOR KIDNEY STONES  
 HX VASCULAR ACCESS  2015  
 right chest- port placed  HX VASCULAR ACCESS Left 2017 [facility] CATH  
 
SOC: 
Social History Socioeconomic History  Marital status:  Spouse name: Not on file  Number of children: Not on file  Years of education: Not on file  Highest education level: Not on file Occupational History  Not on file Social Needs  Financial resource strain: Not on file  Food insecurity Worry: Not on file Inability: Not on file  Transportation needs Medical: Not on file Non-medical: Not on file Tobacco Use  Smoking status: Never Smoker  Smokeless tobacco: Never Used Substance and Sexual Activity  Alcohol use: Yes Comment: 1 drink per month  Drug use: No  
 Sexual activity: Yes Lifestyle  Physical activity Days per week: Not on file Minutes per session: Not on file  Stress: Not on file Relationships  Social connections Talks on phone: Not on file Gets together: Not on file Attends Synagogue service: Not on file Active member of club or organization: Not on file Attends meetings of clubs or organizations: Not on file Relationship status: Not on file  Intimate partner violence Fear of current or ex partner: Not on file Emotionally abused: Not on file Physically abused: Not on file Forced sexual activity: Not on file Other Topics Concern  Not on file Social History Narrative Lives in [facility] alone.  passed away in 5/16 of a heart attack. Has 2 sons. Disability. 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 Enjoys swimming and going to the gym. Family History Family History Problem Relation Age of Onset  Hypertension Mother Lico.Michelle Arthritis-osteo Mother  Hypertension Father  Arthritis-osteo Father  Cancer Father PROSTATE  COPD Father  Hypertension Brother  Elevated Lipids Brother  Arthritis-osteo Brother  Hypertension Brother  Elevated Lipids Brother  Obesity Brother  Cancer Brother PROSTATE  Anesth Problems Son DELAYED AWAKENING  
 Diabetes Maternal Grandmother  Anesth Problems Paternal Grandmother PATIENT STATES GRANDMOTHER'S HEART STOPPED DURING SURGERY Medications: (reviewed) Current Outpatient Medications on File Prior to Visit Medication Sig Dispense Refill  Zejula 100 mg cap Take 100 mg by mouth daily. 60 Cap 4  
 fluticasone propionate (FLONASE) 50 mcg/actuation nasal spray 2 SPRAYS IN BOTH NOSTRILS DAILY 1 Bottle 0  
 amoxicillin-clavulanate (AUGMENTIN) 875-125 mg per tablet  omega-3 acid ethyl esters (LOVAZA) 1 gram capsule TAKE 1 CAPSULE BY MOUTH TWICE A  Cap 2  cycloSPORINE (RESTASIS) 0.05 % dpet  albuterol (PROVENTIL VENTOLIN) 2.5 mg /3 mL (0.083 %) nebu Take 2.5 mg by inhalation.  dextroamphetamine-amphetamine (ADDERALL) 20 mg tablet TAKE 1 TABLET BY MOUTH EVERY DAY  0  
 rosuvastatin (CRESTOR) 10 mg tablet TAKE 1 TABLET BY MOUTH EVERY DAY  0  
 telmisartan (MICARDIS) 20 mg tablet TAKE 1 TABLET BY MOUTH EVERY DAY 90 Tab 3  
 atorvastatin (LIPITOR) 20 mg tablet  BD ULTRA-FINE SHORT PEN NEEDLE 31 gauge x 5/16\" ndle USE TO INJECT INSULIN 5 TIMES DAILY 200 Pen Needle 11  
 insulin aspart U-100 (NOVOLOG FLEXPEN U-100 INSULIN) 100 unit/mL (3 mL) inpn INJECT 20 UNITS BEFORE EACH MEAL + 4 UNITS FOR EVERY 50 MG/DL ABOVE 150 MG/DL.  UNITS PER DAY 90 Adjustable Dose Pre-filled Pen Syringe 3  
 LEVEMIR FLEXTOUCH U-100 INSULN 100 unit/mL (3 mL) inpn INJECT 30 UNITS IN AM AND 50 UNITS AT NIGHT. INCREASE AS DIRECTED TO  UNITS/DAY. 90 Adjustable Dose Pre-filled Pen Syringe 3  DEXILANT 60 mg CpDB capsule (delayed release) TAKE 1 CAPSULE BY MOUTH EVERY DAY 90 Cap 3  
 LINZESS 145 mcg cap capsule TAKE 1 CAPSULE BY MOUTH EVERY DAYY 30 Cap 0  
 ondansetron (ZOFRAN ODT) 4 mg disintegrating tablet Take 1 Tab by mouth every eight (8) hours as needed for Nausea. Indications: Nausea and Vomiting caused by Cancer Drugs 30 Tab 2  
 levothyroxine (SYNTHROID) 150 mcg tablet TAKE 1 TABLET BY MOUTH EVERY DAY BEFORE BREAKFAST 90 Tab 0  
 EPINEPHrine (EPIPEN) 0.3 mg/0.3 mL injection INJECT 0.3 ML BY INTRAMUSCULAR ROUTE ONCE AS NEEDED FOR UP TO 1 DOSE.  1  
 glucose blood VI test strips (TRUE METRIX GLUCOSE TEST STRIP) strip by Does Not Apply route See Admin Instructions. 30 Strip 5  
 lancets misc by Does Not Apply route daily. True Metrix lancets- test blood sugar once per day 30 Each 5  Blood-Glucose Meter monitoring kit Check up to tid, as directed 1 Kit 0  
 azelastine (ASTELIN) 137 mcg (0.1 %) nasal spray 1 [place] by Both Nostrils route daily as needed. Use in each nostril as directed  insulin detemir U-100 (LEVEMIR) 100 unit/mL injection 20 Units by SubCUTAneous route daily (with lunch).  montelukast (SINGULAIR) 10 mg tablet Take 10 mg by mouth nightly.  ascorbic acid, vitamin C, (VITAMIN C) 1,000 mg tablet Take 1,000 mg by mouth daily.  albuterol (PROVENTIL VENTOLIN) 2.5 mg /3 mL (0.083 %) nebulizer solution 2.5 mg by Nebulization route every four (4) hours as needed for Wheezing.  Liraglutide (VICTOZA) 0.6 mg/0.1 mL (18 mg/3 mL) pnij 1.8 mg by SubCUTAneous route daily (with lunch). 3 Pen 3  
 SYMBICORT 160-4.5 mcg/actuation HFAA Take 2 Puffs by inhalation two (2) times daily as needed. 2 Inhaler 3  clonazePAM (KLONOPIN) 0.5 mg tablet Take 0.5 mg by mouth nightly as needed.  sertraline (ZOLOFT) 50 mg tablet TAKE 1 TABLET BY MOUTH as needed  1  cholecalciferol, VITAMIN D3, (VITAMIN D3) 5,000 unit tab tablet Take 5,000 Units by mouth daily.  furosemide (LASIX) 40 mg tablet Take 40 mg by mouth every other day. 3 No current facility-administered medications on file prior to visit. Allergies: (reviewed) Allergies Allergen Reactions  Carboplatin Other (comments) Patient developed shortness of breath, felt faint, coughing, skin flushed and \"itchy\". This occurred on the patients 8th treatment.  Iodinated Contrast Media Rash 13 hr pre-medication prior to IV contrast.  
Patient has done well with 1 hr pre-medication of (Solu-Medrol) 40mg &  (Benadryl) 50mg.  Lipitor [Atorvastatin] Myalgia  Shellfish Containing Products Hives  Tape [Adhesive] Itching and Other (comments) \"op site-- clear,thin tape\"--caused blister  Tomato Hives  Iodine And Iodide Containing Products Rash  Olmesartan Other (comments)  Losartan Other (comments)  
  headaches  Percocet [Oxycodone-Acetaminophen] Other (comments) Fever; however, current home med OBJECTIVE Physical Exam 
Visit Vitals LMP 09/07/2011 *deferred today given video-conference visit for ongoing COVID-19 pandemic* Physical Exam: 
General: Alert and oriented. No acute distress. Well-nourished HEENT: No thyroid enlargment. Neck supple without restrictions. Sclera normal. Normal occular motion. Moist mucous membranes. Lymphatics: No evidence of axillary, cervical, or subclavicular adenopathy. Respiratory: clear to auscultation and percussion to the bases. No CVAT. Chest wall mass is still palpable along the superior sternum at the level of the superior aspect of the breasts, but improved. Normal overlying skin Cardiovascular: regular rate and rhythm. No murmurs, rubs, or gallops. Gastrointestinal: soft, non-tender, non-distended, no masses or organomegaly. Well-healed incision. Musculoskeletal: normal gait. No joint tenderness, deformity or swelling. No muscular tenderness. Extremities: extremities normal, atraumatic, mild 1+ edema bilaterally. Pelvic: deferred today Neuro: Grossly intact. Normal gait and movement. No acute deficit Skin: No evidence of rashes or skin changes. DATE REVIEW as available: 
Lab Results Component Value Date/Time  WBC 6.5 05/14/2020 04:10 PM  
 Hemoglobin (POC) 10.5 (L) 03/20/2017 12:15 PM  
 HGB 7.4 (L) 05/14/2020 04:10 PM  
 Hematocrit (POC) 31 (L) 03/20/2017 12:15 PM  
 HCT 25.3 (L) 05/14/2020 04:10 PM  
 PLATELET 460 49/32/2234 04:10 PM  
 MCV 96.6 05/14/2020 04:10 PM  
 
Lab Results Component Value Date/Time  
 ABS. NEUTROPHILS 3.9 05/14/2020 04:10 PM  
 
Lab Results Component Value Date/Time Sodium 140 05/14/2020 04:10 PM  
 Potassium 4.6 05/14/2020 04:10 PM  
 Chloride 114 (H) 05/14/2020 04:10 PM  
 CO2 19 (L) 05/14/2020 04:10 PM  
 Anion gap 7 05/14/2020 04:10 PM  
 Glucose 90 05/14/2020 04:10 PM  
 BUN 29 (H) 05/14/2020 04:10 PM  
 Creatinine 1.42 (H) 05/14/2020 04:10 PM  
 BUN/Creatinine ratio 20 05/14/2020 04:10 PM  
 GFR est AA 46 (L) 05/14/2020 04:10 PM  
 GFR est non-AA 38 (L) 05/14/2020 04:10 PM  
 Calcium 8.6 05/14/2020 04:10 PM  
 Bilirubin, total 0.3 05/14/2020 04:10 PM  
 AST (SGOT) 48 (H) 05/14/2020 04:10 PM  
 Alk. phosphatase 94 05/14/2020 04:10 PM  
 Protein, total 8.2 05/14/2020 04:10 PM  
 Albumin 3.3 (L) 05/14/2020 04:10 PM  
 Globulin 4.9 (H) 05/14/2020 04:10 PM  
 A-G Ratio 0.7 (L) 05/14/2020 04:10 PM  
 ALT (SGPT) 28 05/14/2020 04:10 PM  
 
 
ECHO 7/17/18 Left ventricle: Systolic function was normal. Ejection fraction was estimated in the range of 55 % to 60 %. There were no regional wall motion 
abnormalities. Wall thickness was mildly increased. Left atrium: The atrium was mildly dilated. Mitral valve: There was mild regurgitation. Aortic valve: Leaflets exhibited sclerosis without stenosis. Not well visualized. IMPRESSION AND PLAN: 
Ms. Melissa Swartz is a 63 y.o. female with recurrent, metastatic ovarian cancer. Heavily pre-treated. Lost to follow-up since 9/2018, at which time patient self-discontinued weekly paclitaxel. Initiated Margreta Flowers on 1/23/19. Held after 1 month secondary to rising creatinine. Found to have right hydronephrosis.  Right ureteral stent placed 3/18/19 with normalization of creatinine. Completed 2700 cGy to the sternun 7/11/19. Restarted Zejula at 100mg/daily on 4/23/19. ECOG 1 Problem List Items Addressed This Visit Circulatory Hypertension Immune Lymphadenopathy, mediastinal  
  
 Reproductive Ovarian cancer ([facility] [address] - Primary Other Port-A-Cath in place Pain due to malignant neoplasm metastatic to bone [facility]) Mass of chest wall S/P ureteral stent placement Reviewed patient's course to date. Tolerating Zejula 100mg/daily. Creatinine stable. Ca-125 continues to remain stable between 220-268, currently 233. It jumps around within this range for the last few months. . CT C/A/P on 10/2/19 with improvement and stability of disease burden. Stable Hgb. Normal iron and B12 studies. She is tolerating treatment well. Will plan to continue treatment. RTC in 1 month for consideration of next cycle. Will order CT C/A/P at that time for documentation of response/progression/stability. Again noted that while the patient is BRCA negative, there is some evidence that PARPi may provide some benefit to BRCA wild-type patients. Previously I discussed goals of care, including that any type of treatment at this time is palliative in nature. I have reviewed her chemotherapy history, which is extensive; although the patient has been somewhat non-compliant with treatments with missed treatments \"here and there\". Stressed importance of remaining on treatment. Previously discussed that palliative therapy at this time has a response rate at best of 5-10% regardless of the type of treatment. Urology is managing her stent and recurrent UTIs. She is on antibiotic suppression given the continued recurrence of her UTI. All questions and concerns were addressed with the patient and she is comfortable with the plan.  
 
Michelle Klein MD 
 
 
Pursuant to the emergency declaration unde the [address] and the National Emergencies Act, [facility] waiver authority and the [employer] General Act, this Virtual Visit was conducted, with patient's consent, to reduce the patient's risk of exposure to COVID-19 and provide continuity of care for an established patient. Services were provided through a video synchronous discussion virtually to substitute for in-person clinic visit. I spent at least 25 minutes with this established patient, and >50% of the time was spent counseling and/or coordinating care regarding ovarian cancer Se Guzman MD

## 2020-05-19 NOTE — PROGRESS NOTES
one month follow up on Zejula, malignant neoplam of ovary, virtual visit, no vitals taken Pt states she is having lower abdominal pain and cramping, she states she saw her PCP on 4/30/2020 and was diagnosed with another UTI, and blood in her urine, given a 2 week ABX which she has completed, still not feeling well, will make an appt to see her urologist, she states she used to take B12 injections, would like to discuss taking those again, she is scheduled for a bone density on 6/4/2020 and eye doctor appt on 6/8/2020 1. Have you been to the ER, urgent care clinic since your last visit? Hospitalized since your last visit?  no 
 
2. Have you seen or consulted any other health care providers outside of the [address] [place] [address] since your last visit? Include any pap smears or colon screening. PCP on 4/30/2020

## 2020-06-03 NOTE — TELEPHONE ENCOUNTER
I reached out to pt, who states that she now has another PCP. Pt informed to contact her pharmacy to notify.

## 2020-06-03 NOTE — TELEPHONE ENCOUNTER
----- Message from Susan Campbell MD sent at 6/3/2020  9:45 AM EDT -----  Regarding: Needs appt  Last seen early 2019

## 2020-06-11 NOTE — ROUTINE PROCESS
1600 Pt admit to [facility] for [facility] Flush/Labs ambulatory in stable condition. Assessment completed. No new concerns voiced. Port accessed and flushed with positive blood return. Labs drawn per order and sent for processing. Please see connect [facility] for pending lab results. Visit Vitals  /72   Pulse 72   Temp 97.8 °F (36.6 °C)   Resp 16   LMP 09/07/2011   Breastfeeding No       Medications:  Medications Administered     0.9% sodium chloride injection 10 mL     Admin Date  06/11/2020 Action  Given Dose  10 mL Route  IntraVENous Administered By  Lino Finley RN          heparin (porcine) pf 500 Units     Admin Date  06/11/2020 Action  Given Dose  500 Units Route  IntraVENous Administered By  Lino Finley RN          sodium chloride (NS) flush 5-10 mL     Admin Date  06/11/2020 Action  Given Dose  10 mL Route  IntraVENous Administered By  Lino Finley, NIKIA                  [id number] Pt tolerated treatment well. D/c home ambulatory in no distress. Pt aware of next [facility] appointment scheduled for 7/9/2020.

## 2020-06-15 NOTE — TELEPHONE ENCOUNTER
S/w pt, she will do standard pretreatment, diphenhydramine 50 mg po one hour prior to scan, and prednisone 50 mg 13 hours, 7 hours and one hour prior to scan, to be sent to pharmacy on file

## 2020-06-30 NOTE — PROGRESS NOTES
Corky Ward is a 63 y.o. female who was seen by synchronous (real-time) audio-video technology on 2020 [address] [facility], [address] [place] [address] Thea Marin 
P [phone]  F [phone] Office Visit Patient ID: 
Name: Corky Ward MRN: [id number] : 1960/59 y.o. Visit date: 2020 LEONID Martinez is a 63 y.o.  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 The patient's previous treatment procedures include primary Taxol/carbo with retreatment in  and Doxil/Avastin, topotecan, gemzar and now single agent weekly Taxol initiated 18 following progression with chest wall involvement. In 2018, patient did not follow up and self-discontinued her treatment regimen. She presented to the ER on 18 with SOB. Negative workup for PE or MI. Admitted to Hospitalist service from -18. Managed for acute asthma exacerbation, acute FELICIA, and hyperkalemia. The patient was initiated on Derril Coombes on 19. Underwent repeat CT C/A/P on 19 for purposes of following response to treatment. Patient was tolerating Derril Coombes well for the first month, but then had a rise in her creatinine. However, she was also found to have hydronephrosis. Underwent right ureteral stent placement on 3/18/19. Restarted Zejula afterwards, although with continued rise in creatinine to >2. Held Derril Coombes again and restarted Zejula at 100mg/daily on 19. Presents today for follow-up and consideration of continuation of Derril Coombes. Completed 2700 cGy of radiation to her chest wall mass on 19. Tolerating treatment well. Held for one week in 10/2019 for blood transfusion secondary to anemia. Normal iron and B12 labs. Her Ca-125 has bounced around, but has been steadily declining recently. CT C/A/P on 10/2/19 with improvement in her disease. The pain at her chest wall mass is much improved. Otherwise the patient is really without complaints. She remains on Zejula. She is on UTI suppression with her Urologist as she has had multiple UTIs with her ureteral stent in place. She was recently treated for a UTI and is having some hematuria. She is scheduled to see her Urologist soon. She reports some cramping in the lower abdomen which is new. Denies change in appetite or bowel habits. Denies vaginal bleeding, hematuria, hematochezia, constipation, diarrhea, nausea, vomiting, CP, SOB, fevers, or chills. CT C/A/P on 10/2/19 with improvement in her disease. Had ureteral stent exchange on 10/31/19.  
   
OncTx History: 
9/21/12: MARAH/BSO/Cytoreductive surgery on 9/21/12 noted carcinomatosis/omental caking. 10/2012-2/2013: 6 cycles Carbo/Taxol 8/2014-11/2014: 6 Cycles carbo/Taxol 3/2015-5/13/15: 3 cycles Avastin/Doxil until progression 7/2015-8/2015 Weekly Topotecan  
12/2015-3/2016: Weekly Topotecan 2/2017-9/2017Allison Grady D1/D8 Q28 days for 6 cycles 4/2018: CT core needle bx chest wall mass: Metastatic high-grade serous carcinoma (see comment) General Categorization Positive for malignancy. 5/8/2018-Present: Taxol  J8,1,89; S/P  Cycle #4  8/14/2018. Patient discontinued treatment secondary to side effects and fatigue. 1/23/2019 - Initiated Vamarilyn Bartonon. Held do to rising creatinine. However, she was also found to have hydronephrosis. Underwent right ureteral stent placement on 3/18/19. CT C/A/P 6/24/2020 with mixed response, but overall progression. Rising Ca-125 now 268. Imaging Review:  
CT C/A/P 6/24/2020: 
FINDINGS:  
THYROID: No nodule. MEDIASTINUM: Left paratracheal adenopathy now measures 3.7 x 2.0 cm increased in 
size. Mass in AP window which extends superiorly into the left infraclavicular 
region and inferiorly into the left hilum is also increase in size.  When 
measured in a similar fashion this measures 3.5 x 2.4 previously 3.0 x 2.9 cm. 
Right parasternal mass is decreased in size measuring 3.4 x 3.4 cm. Subcarinal 
adenopathy measures 2.8 x 2.3 cm increased in size. There is increased size of 
the nodular component adjacent to the xiphoid now measuring 2.6 x 3.3 cm. SWATI: Left hilar lymph node measures 3.2 x 2.4 cm previously 2.5 x 1.8 cm. THORACIC AORTA: No dissection or aneurysm. MAIN PULMONARY ARTERY: Normal in caliber. TRACHEA/BRONCHI: Patent. ESOPHAGUS: No wall thickening or dilatation. HEART: Normal in size. PLEURA: No effusion or pneumothorax. LUNGS: No nodule, mass, or airspace disease. LIVER: Multiple masses are noted and better visualized on current study 
secondary to the addition of intravenous contrast. Comparison lesions measure 
previously, there is a 3.5 x 2.8 cm mass in the right hepatic lobe series 3 
image 58 slightly increased in size. There is a 3.6 x 2.9 cm mass in the left 
hepatic lobe slightly decreased in size series 3 image 56. Left hepatic lobe 
mass series 3 image 45 measuring 4.0 x 2.8 cm slightly increased in size. There 
is extensive tumor infiltration in the liver hilum with narrowing of the main 
portal vein as well as the intrahepatic portal veins. The common hepatic artery 
is not well visualized and likely occluded with some collateral flow noted in 
the liver hilum. GALLBLADDER: Not visualized SPLEEN: Mass in the splenic hilum increased in size measuring 7.4 x 4.3 cm with 
invasion into the splenic hilum. PANCREAS: The pancreas is encased by tumor. ADRENALS: There is likely involvement of the left adrenal gland tumor. KIDNEYS: Right nephroureteral stent with mild to moderate right-sided 
hydronephrosis. STOMACH: Tumor encasing the gastric antrum. SMALL BOWEL: No dilatation or wall thickening. COLON: Tumor abutting the tumor encasing the rectosigmoid junction. Tumor 
abutting the transverse colon. APPENDIX: Unremarkable. PERITONEUM: Some free fluid noted in the pelvis.  Increased size of mesenteric 
masses. Representative examples: 7.8 x 4.3 cm tumor anterior peritoneum series 3 
image 84. No pelvic tumor 7.5 x 4.8 cm series 3 image 100. RETROPERITONEUM:  
Right pelvic sidewall increased in size. Large right lateral lymph node 
measuring 3.1 x 2.0 cm not significantly changed. REPRODUCTIVE ORGANS: The visualized URINARY BLADDER: No mass or calculus. BONES: No destructive bone lesion. ADDITIONAL COMMENTS: N/A IMPRESSION IMPRESSION: 
1. There is some variable response to therapy with several lesions decreased in 
size. However a majority of the lesions have increased in size consistent with 
progression of disease with involvement of the mediastinum, liver masses, and 
peritoneal carcinomatosis. 2.  Increase right-sided hydronephrosis. Darlynn Magic CT C/A/P 10/2/19:  
FINDINGS: 
THYROID: Visualized gland is unremarkable. MEDIASTINUM: Upper left paratracheal node measures 2.2 x 3.4 cm (2, 8), 
previously 2.3 x 3.5 cm. Superior prevascular node measures 2.9 x 3.0 cm, 
previously 3.5 x 3.8 cm. Poorly delineated subcarinal node measures about 3.4 x 
4.8 cm, previously 3.6 x 4.8 cm. Precardiac nodes measure 1.9 x 2.5 cm and 1.7 x 
2.4 cm (2, 39), previously 2.1 x 2.7 cm and 2.0 x 2.0 cm. SWATI: No mass or lymphadenopathy. THORACIC AORTA: No dissection or aneurysm. MAIN PULMONARY ARTERY: Normal in caliber. TRACHEA/BRONCHI: Patent. ESOPHAGUS: No wall thickening or dilatation. HEART: Normal in size. PLEURA: No effusion or pneumothorax. LUNGS: No nodule, mass, or airspace disease. LIVER: Multiple hypodense lesions suspicious for metastases, measuring up to 2.5 
x 4.0 cm in segment 2 (2, 43) series, previously 2.8 x 3.7 cm, 3.2 x 4.6 cm in 
segment 3 (2, 55), previously 3.1 x 4.5 cm, and partially exophytic from the 
posterior inferior right lobe segment 6 measuring 2.5 x 3.5 cm, previously 2.8 x 
3.4 cm. GALLBLADDER: Unremarkable. SPLEEN: Central 1.9 x 3.1 cm hypodensity (2, 47), previously 1.9 x 2.3 cm. Otherwise unremarkable. PANCREAS: Inseparable from bulky central abdominal adenopathy with no identified 
ductal dilation. ADRENALS: Unremarkable. KIDNEYS: Right hydronephrosis has improved status post right nephroureteral 
stent placement. Kidneys otherwise appear unremarkable. STOMACH: Unremarkable. SMALL BOWEL: No dilatation or wall thickening. COLON: No dilation or wall thickening. APPENDIX: Unremarkable. PERITONEUM: Extensive peritoneal implants with representative largest lesions 
measuring 4.4 x 6.1 cm and the left adnexal region (2, 98), previously 4.3 x 5.7 
cm, 3.8 x 5.9 cm in the anterior midline (2, 81), previously 3.4 x 5.4 cm, and 
2.8 x 4.7 cm in the right abdomen (2, 78), previously 3.2 x 4.7 cm. RETROPERITONEUM: Extensive rocio hepatis, retroperitoneal, and pelvic sidewall 
adenopathy. Conglomerate nkechi mass in the rocio hepatis region measures 5.7 x 
11.1 cm (2, 58) is, previously 6.3 x 12.2 cm. Aortocaval node measures 3.7 x 5.3 
cm (2, 77), previously 4.5 x 5.4 cm. Right pelvic sidewall node measures 2.9 x 
4.2 cm (2, 103), previously 3.4 x 4.2 cm. Left external iliac node measures 3.0 
x 4.8 cm (2, 101), previously 3.3 x 4.8 cm. REPRODUCTIVE ORGANS: Uterus and ovaries are surgically absent. URINARY BLADDER: No mass or calculus. Distal end of right nephroureteral stent 
positioned within bladder lumen. BONES: Degenerative spine change. No acute fracture or aggressive lesion. ADDITIONAL COMMENTS: Mass centered at the right sternocostal junction measures 5.3 x 5.4 cm (2, 21), previously 4.8 x 5.7 cm. Post surgical changes in the 
anterior abdominal wall. Left Port-A-Cath in place. Bilateral inguinal 
adenopathy measuring 2.1 x 3.2 cm on the right (2, 115), previously 2.3 x 3.7 cm 
and 2.7 x 2.8 cm on the left (2, 108), previously 2.9 x 3.1 cm. Gerald Theresa IMPRESSION 
 IMPRESSION: Overall response to disease with either decrease in size or no 
significant change in extensive nkechi metastases, hepatic metastases, right 
sternal metastasis, and peritoneal carcinomatosis as above. No new metastasis or 
progressive metastasis identified. Status post right nephroureteral stent with 
interval resolution of right hydronephrosis. CT C/A/P 2/5/19: 
FINDINGS:  
THYROID: No nodule. MEDIASTINUM: Mediastinal lymphadenopathy is unchanged. A reference superior 
mediastinal lymph node is stable measuring 3.3 cm. SWATI: No mass or lymphadenopathy. THORACIC AORTA: No dissection or aneurysm. MAIN PULMONARY ARTERY: Normal in caliber. TRACHEA/BRONCHI: Patent. ESOPHAGUS: No wall thickening or dilatation. HEART: Normal in size. PLEURA: No effusion or pneumothorax. LUNGS: No nodule, mass, or airspace disease. LIVER: Evaluation of the liver is limited by lack of intravenous contrast. There 
is a vague 4.4 cm hypodense lesion in the left hepatic lobe previously measuring 3.0 cm. Lesion in the right hepatic lobe now measures 3.3 cm, previously 
measuring 2.7 cm. GALLBLADDER: Unremarkable. SPLEEN: Central soft tissue abnormality has increased in size, now measuring 4.9 
cm, previously measuring 3.1 cm. PANCREAS: Not well evaluated given the massive lymphadenopathy and lack of 
intravenous contrast. 
ADRENALS: Unremarkable. KIDNEYS: There is no moderate right hydronephrosis. STOMACH: Unremarkable. SMALL BOWEL: No dilatation or wall thickening. COLON: No dilatation or wall thickening. APPENDIX: Not visualized. PERITONEUM: Bulky gastrohepatic, periceliac, portacaval, and mesenteric 
lymphadenopathy has increased. There is secondary invasion of the liver. Maximum 
dimension in the upper abdomen is currently 10.4 cm, previously 9.3 cm. Elemental soft tissue nodules have increased in size, with a reference 7.0 cm 
nodule in the left abdomen previously measuring 5.5 cm.  Soft tissue nodule in 
 the deep pelvis between the bladder and colon has increased in size as well. RETROPERITONEUM: No lymphadenopathy or aortic aneurysm. REPRODUCTIVE ORGANS: The uterus and ovaries are surgically absent. URINARY BLADDER: No mass or calculus. BONES: Soft tissue mass involving the right sternum has not changed. Degenerative changes are seen in the thoracic and lumbar spine. ADDITIONAL COMMENTS: N/A IMPRESSION IMPRESSION: 
Stable mediastinal lymphadenopathy. Progressive abdominal lymphadenopathy as 
described above. Liver lesions have increased in size compared to the prior 
exam. Stable right sternal mass. Moderate right hydroureteronephrosis is now 
noted which appears to be related to the lymphadenopathy. ROS Constitutional: no weight loss, fever, night sweats Respiratory: no cough, shortness of breath, or wheezing Cardiovascular: no chest pain or dyspnea on exertion. Reports chest wall mass smaller, no associated pain. Heme: No abnormal bleeding Gastrointestinal: no abdominal pain, change in bowel habits, or black or bloody stools Genito-Urinary: no dysuria, trouble voiding, or hematuria Musculoskeletal: + swelling in ankle - bilateral, mostly only at end of day Neurological: mild neuropathy Derm: pigmentation/induration medial left leg. Prior injury from fall. Psych: positive for depression - controlled PMH: 
Past Medical History:  
Diagnosis Date  Adverse effect of anesthesia   
 sleep apnea cpap machine useage  Anemia  Arthritis DDD low back and knees  Asthma   
 uses albuterol prn only; none x 6 mos. Never hospitalized  BRCA negative 1/2013  Calculus of kidney  Chronic kidney disease   
 kidney stones  Chronic pain   
 knee pain bilat  CINV (chemotherapy-induced nausea and vomiting) 8/21/2014  Decreased hearing, right PATIENT STATES THIS IS DUE TO CHEMOTHERAPY  Diabetes ([facility] [address] Age 46 IDDM  Environmental allergies  Fibromyalgia  GERD (gastroesophageal reflux disease)   
 controlled with med  Hx of pulmonary embolus during pregnancy 2015  Hydronephrosis due to obstruction of ureter 3/18/2019  Hypertension  Ill-defined condition Sepsis  -  SOURCE PORT  
 Ill-defined condition 2016  
 chemotherapy  Mass of chest wall 2018  Morbid obesity ([facility] [address]  Murmur, heart  Other and unspecified hyperlipidemia  Ovarian cancer ([facility] [address] 2012, 2014  
 serous, high grade, stage IIIc. s/p chemotx (has port)  Psychiatric disorder Depression/Anxiety  Rectal bleeding  Thromboembolus ([address]  POST PARTUM; resolved- PELVIS  Thyroid disease HYPOTHYROID  Unspecified sleep apnea CPAP, Dr. Rhonda Lazcano PSH: 
Past Surgical History:  
Procedure Laterality Date  BREAST SURGERY PROCEDURE UNLISTED Lymph node in left breast 2017 Walter  
  X2  
 HX HYSTERECTOMY  2012 ROULA/BSO, tumor debulking, omentectomy for ovarian cancer  HX ORTHOPAEDIC Right   
 ankle-FX, R  
 HX ORTHOPAEDIC Right   
 FX R ARM  
 HX UROLOGICAL Right  STENT FOR KIDNEY STONES  
 HX VASCULAR ACCESS  2015  
 right chest- port placed  HX VASCULAR ACCESS Left 2017 TETE CATH  
 
SOC: 
Social History Socioeconomic History  Marital status:  Spouse name: Not on file  Number of children: Not on file  Years of education: Not on file  Highest education level: Not on file Occupational History  Not on file Social Needs  Financial resource strain: Not on file  Food insecurity Worry: Not on file Inability: Not on file  Transportation needs Medical: Not on file Non-medical: Not on file Tobacco Use  Smoking status: Never Smoker  Smokeless tobacco: Never Used Substance and Sexual Activity  Alcohol use: Yes Comment: 1 drink per month  Drug use: No  
 Sexual activity: Yes Lifestyle  Physical activity Days per week: Not on file Minutes per session: Not on file  Stress: Not on file Relationships  Social connections Talks on phone: Not on file Gets together: Not on file Attends Voodoo service: Not on file Active member of club or organization: Not on file Attends meetings of clubs or organizations: Not on file Relationship status: Not on file  Intimate partner violence Fear of current or ex partner: Not on file Emotionally abused: Not on file Physically abused: Not on file Forced sexual activity: Not on file Other Topics Concern  Not on file Social History Narrative Lives in [place] alone.  passed away in 5/16 of a heart attack. Has 2 sons. Disability. 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 Enjoys swimming and going to the gym. Family History Family History Problem Relation Age of Onset  Hypertension Mother [address] [facility] Arthritis-osteo Mother  Hypertension Father  Arthritis-osteo Father  Cancer Father PROSTATE  COPD Father  Hypertension Brother  Elevated Lipids Brother  Arthritis-osteo Brother  Hypertension Brother  Elevated Lipids Brother  Obesity Brother  Cancer Brother PROSTATE  Anesth Problems Son DELAYED AWAKENING  
 Diabetes Maternal Grandmother  Anesth Problems Paternal Grandmother PATIENT STATES GRANDMOTHER'S HEART STOPPED DURING SURGERY Medications: (reviewed) Current Outpatient Medications on File Prior to Visit Medication Sig Dispense Refill  amoxicillin (AMOXIL) 500 mg capsule  predniSONE (DELTASONE) 50 mg tablet Take 1 tablet 13 hours prior to scan, take 1 tablet 7 hours prior to scan and take 1 tablet one hour prior to scan 3 Tab 0  Zejula 100 mg cap Take 100 mg by mouth daily.  60 Cap 4  
 fluticasone propionate (FLONASE) 50 mcg/actuation nasal spray 2 SPRAYS IN BOTH NOSTRILS DAILY 1 Bottle 0  
 omega-3 acid ethyl esters (LOVAZA) 1 gram capsule TAKE 1 CAPSULE BY MOUTH TWICE A  Cap 2  cycloSPORINE (RESTASIS) 0.05 % dpet  albuterol (PROVENTIL VENTOLIN) 2.5 mg /3 mL (0.083 %) nebu Take 2.5 mg by inhalation.  dextroamphetamine-amphetamine (ADDERALL) 20 mg tablet TAKE 1 TABLET BY MOUTH EVERY DAY  0  
 rosuvastatin (CRESTOR) 10 mg tablet TAKE 1 TABLET BY MOUTH EVERY DAY  0  
 telmisartan (MICARDIS) 20 mg tablet TAKE 1 TABLET BY MOUTH EVERY DAY 90 Tab 3  
 atorvastatin (LIPITOR) 20 mg tablet  BD ULTRA-FINE SHORT PEN NEEDLE 31 gauge x 5/16\" ndle USE TO INJECT INSULIN 5 TIMES DAILY 200 Pen Needle 11  
 insulin aspart U-100 (NOVOLOG FLEXPEN U-100 INSULIN) 100 unit/mL (3 mL) inpn INJECT 20 UNITS BEFORE EACH MEAL + 4 UNITS FOR EVERY 50 MG/DL ABOVE 150 MG/DL.  UNITS PER DAY 90 Adjustable Dose Pre-filled Pen Syringe 3  
 LEVEMIR FLEXTOUCH U-100 INSULN 100 unit/mL (3 mL) inpn INJECT 30 UNITS IN AM AND 50 UNITS AT NIGHT. INCREASE AS DIRECTED TO  UNITS/DAY. 90 Adjustable Dose Pre-filled Pen Syringe 3  DEXILANT 60 mg CpDB capsule (delayed release) TAKE 1 CAPSULE BY MOUTH EVERY DAY 90 Cap 3  
 LINZESS 145 mcg cap capsule TAKE 1 CAPSULE BY MOUTH EVERY DAYY 30 Cap 0  
 ondansetron (ZOFRAN ODT) 4 mg disintegrating tablet Take 1 Tab by mouth every eight (8) hours as needed for Nausea. Indications: Nausea and Vomiting caused by Cancer Drugs 30 Tab 2  
 levothyroxine (SYNTHROID) 150 mcg tablet TAKE 1 TABLET BY MOUTH EVERY DAY BEFORE BREAKFAST 90 Tab 0  
 EPINEPHrine (EPIPEN) 0.3 mg/0.3 mL injection INJECT 0.3 ML BY INTRAMUSCULAR ROUTE ONCE AS NEEDED FOR UP TO 1 DOSE.  1  
 glucose blood VI test strips (TRUE METRIX GLUCOSE TEST STRIP) strip by Does Not Apply route See Admin Instructions. 30 Strip 5  
 lancets misc by Does Not Apply route daily.  True Metrix lancets- test blood sugar once per day 30 Each 5  
  Blood-Glucose Meter monitoring kit Check up to tid, as directed 1 Kit 0  
 azelastine (ASTELIN) 137 mcg (0.1 %) nasal spray 1 [place] by Both Nostrils route daily as needed. Use in each nostril as directed  insulin detemir U-100 (LEVEMIR) 100 unit/mL injection 20 Units by SubCUTAneous route daily (with lunch).  montelukast (SINGULAIR) 10 mg tablet Take 10 mg by mouth nightly.  ascorbic acid, vitamin C, (VITAMIN C) 1,000 mg tablet Take 1,000 mg by mouth daily.  albuterol (PROVENTIL VENTOLIN) 2.5 mg /3 mL (0.083 %) nebulizer solution 2.5 mg by Nebulization route every four (4) hours as needed for Wheezing.  Liraglutide (VICTOZA) 0.6 mg/0.1 mL (18 mg/3 mL) pnij 1.8 mg by SubCUTAneous route daily (with lunch). 3 Pen 3  
 SYMBICORT 160-4.5 mcg/actuation HFAA Take 2 Puffs by inhalation two (2) times daily as needed. 2 Inhaler 3  clonazePAM (KLONOPIN) 0.5 mg tablet Take 0.5 mg by mouth nightly as needed.  sertraline (ZOLOFT) 50 mg tablet TAKE 1 TABLET BY MOUTH as needed  1  cholecalciferol, VITAMIN D3, (VITAMIN D3) 5,000 unit tab tablet Take 5,000 Units by mouth daily.  furosemide (LASIX) 40 mg tablet Take 40 mg by mouth every other day. 3  
 amoxicillin-clavulanate (AUGMENTIN) 875-125 mg per tablet No current facility-administered medications on file prior to visit. Allergies: (reviewed) Allergies Allergen Reactions  Carboplatin Other (comments) Patient developed shortness of breath, felt faint, coughing, skin flushed and \"itchy\". This occurred on the patients 8th treatment.  Iodinated Contrast Media Rash 13 hr pre-medication prior to IV contrast.  
Patient has done well with 1 hr pre-medication of (Solu-Medrol) 40mg &  (Benadryl) 50mg.  Lipitor [Atorvastatin] Myalgia  Shellfish Containing Products Hives  Tape [Adhesive] Itching and Other (comments) \"op site-- clear,thin tape\"--caused blister  Tomato Hives  Iodine And Iodide Containing Products Rash  Olmesartan Other (comments)  Losartan Other (comments)  
  headaches  Percocet [Oxycodone-Acetaminophen] Other (comments) Fever; however, current home med OBJECTIVE Physical Exam 
Visit Vitals LMP 09/07/2011 *deferred today given video-conference visit for ongoing COVID-19 pandemic* Physical Exam: 
General: Alert and oriented. No acute distress. Well-nourished HEENT: No thyroid enlargment. Neck supple without restrictions. Sclera normal. Normal occular motion. Moist mucous membranes. Lymphatics: No evidence of axillary, cervical, or subclavicular adenopathy. Respiratory: clear to auscultation and percussion to the bases. No CVAT. Chest wall mass is still palpable along the superior sternum at the level of the superior aspect of the breasts, but improved. Normal overlying skin Cardiovascular: regular rate and rhythm. No murmurs, rubs, or gallops. Gastrointestinal: soft, non-tender, non-distended, no masses or organomegaly. Well-healed incision. Musculoskeletal: normal gait. No joint tenderness, deformity or swelling. No muscular tenderness. Extremities: extremities normal, atraumatic, mild 1+ edema bilaterally. Pelvic: deferred today Neuro: Grossly intact. Normal gait and movement. No acute deficit Skin: No evidence of rashes or skin changes. DATE REVIEW as available: 
Lab Results Component Value Date/Time WBC 6.3 06/11/2020 04:08 PM  
 Hemoglobin (POC) 10.5 (L) 03/20/2017 12:15 PM  
 HGB 7.5 (L) 06/11/2020 04:08 PM  
 Hematocrit (POC) 31 (L) 03/20/2017 12:15 PM  
 HCT 25.0 (L) 06/11/2020 04:08 PM  
 PLATELET 211 01/79/8706 04:08 PM  
 MCV 96.9 06/11/2020 04:08 PM  
 
Lab Results Component Value Date/Time  
 ABS. NEUTROPHILS 3.6 06/11/2020 04:08 PM  
 
Lab Results Component Value Date/Time  Sodium 140 06/11/2020 04:08 PM  
 Potassium 4.5 06/11/2020 04:08 PM  
 Chloride 113 (H) 06/11/2020 04:08 PM  
 CO2 19 (L) 06/11/2020 04:08 PM  
 Anion gap 8 06/11/2020 04:08 PM  
 Glucose 99 06/11/2020 04:08 PM  
 BUN 26 (H) 06/11/2020 04:08 PM  
 Creatinine 1.49 (H) 06/11/2020 04:08 PM  
 BUN/Creatinine ratio 17 06/11/2020 04:08 PM  
 GFR est AA 44 (L) 06/11/2020 04:08 PM  
 GFR est non-AA 36 (L) 06/11/2020 04:08 PM  
 Calcium 8.9 06/11/2020 04:08 PM  
 Bilirubin, total 0.3 06/11/2020 04:08 PM  
 Alk. phosphatase 134 (H) 06/11/2020 04:08 PM  
 Protein, total 8.7 (H) 06/11/2020 04:08 PM  
 Albumin 3.4 (L) 06/11/2020 04:08 PM  
 Globulin 5.3 (H) 06/11/2020 04:08 PM  
 A-G Ratio 0.6 (L) 06/11/2020 04:08 PM  
 ALT (SGPT) 58 06/11/2020 04:08 PM  
 
 
ECHO 7/17/18 Left ventricle: Systolic function was normal. Ejection fraction was estimated in the range of 55 % to 60 %. There were no regional wall motion 
abnormalities. Wall thickness was mildly increased. Left atrium: The atrium was mildly dilated. Mitral valve: There was mild regurgitation. Aortic valve: Leaflets exhibited sclerosis without stenosis. Not well visualized. IMPRESSION AND PLAN: 
Ms. Juhi Asencio is a 63 y.o. female with recurrent, metastatic ovarian cancer. Heavily pre-treated. Lost to follow-up since 9/2018, at which time patient self-discontinued weekly paclitaxel. Initiated Saloni Jungling on 1/23/19. Held after 1 month secondary to rising creatinine. Found to have right hydronephrosis. Right ureteral stent placed 3/18/19 with normalization of creatinine. Completed 2700 cGy to the sternun 7/11/19. Restarted Zejula at 100mg/daily on 4/23/19. Now with rising Ca-125 and progression on CT 6/24/2020. Problem List Items Addressed This Visit Circulatory Hypertension Immune Lymphadenopathy, mediastinal  
  
 Reproductive Ovarian cancer ([facility] [address] - Primary Respiratory SHABANA on CPAP Other Mass of chest wall Reviewed patient's course to date.  Unfortunately her latest CT C/A/P on 6/24/2020 demonstrates progression of disease with a rising Ca-125. I have recommended changing treatment regimens. She previously was having a response to paclitaxel, but missed a number of treatments and ultimately self-discontinued. However, she would like to go back to this treatment. She will stop her Aletta Bash now and we will work to get her started in the coming weeks. Will need to follow-up her pre-chemo labs, but will plan to proceed with paclitaxel 80mg/m2 on days 1, 8, and 15 in a 28-day cycle. Will plan for 3-4 cycles followed by imaging. Would plan to treat until progression, or may consider maintenance PARPi after 6 cycles with either olaparib or rucaparib. Urology is managing her stent and recurrent UTIs. She is on antibiotic suppression given the continued recurrence of her UTI. All questions and concerns were addressed with the patient and she is comfortable with the plan. Edgard Giles MD 
 
 
Pursuant to the emergency declaration unde the [facility] [address] waiver authority and the [employer] and [employer] Act, this Virtual Visit was conducted, with patient's consent, to reduce the patient's risk of exposure to COVID-19 and provide continuity of care for an established patient. Services were provided through a video synchronous discussion virtually to substitute for in-person clinic visit. I spent at least 25 minutes with this established patient, and >50% of the time was spent counseling and/or coordinating care regarding ovarian cancer Edgard Giles MD

## 2020-06-30 NOTE — TELEPHONE ENCOUNTER
Pt returning my call. Per Dr. Kasey Cornejo she will stop Pamela Sanchez, and to be scheduled for Taxol 80mg/m2 D1, D8, D15 every 28days at [facility]. Pt going on vacation and would like to be scheduled after 8/5/20. Sent prescriptions to pharmacy to use during chemotherapy. Pt will bring her new insurance card with name change from Franck to Do Harvey d/t divorce.

## 2020-06-30 NOTE — TELEPHONE ENCOUNTER
Requested Prescriptions     Signed Prescriptions Disp Refills    dexAMETHasone (DECADRON) 4 mg tablet 36 Tab 2     Sig: Take 2 tablets with breakfast the day before chemo and for 2 days after chemo     Authorizing Provider: Gia Gonzalez User: [place], IVA G    lidocaine-prilocaine (EMLA) topical cream 30 g 3     Sig: Apply small amount over port area and cover with band aid one hour before chemo     Authorizing Provider: Gia Gonzalez User: Mercedes Maidens    ondansetron (ZOFRAN ODT) 4 mg disintegrating tablet 30 Tab 2     Sig: Take 1 Tab by mouth every eight (8) hours as needed for Nausea. Indications: nausea and vomiting caused by cancer drugs     Authorizing Provider: Gia Gonzalez User: Magali Gip to pharmacy per vo Dr. Shandra Garcia to be used during chemotherapy.

## 2020-06-30 NOTE — PROGRESS NOTES
Ct scan results, video call visit 1. Have you been to the ER, urgent care clinic since your last visit? Hospitalized since your last visit?  no 
 
2. Have you seen or consulted any other health care providers outside of the [address] [place] [address] since your last visit? Include any pap smears or colon screening.    no

## 2020-07-01 NOTE — TELEPHONE ENCOUNTER
Pt will get MD/Chemotherapy appts from [address] Per vo Dr. Josette Osullivan pt informed to continue with Toan Emerson daily since she was going on vacation and not starting chemotherapy until 8/6/20.

## 2020-08-03 NOTE — TELEPHONE ENCOUNTER
Pt states she went to Dr. Caitlin Ramirez over a week ago with urinary frequency and burning and was told she had a UTI and was given antibiotics, she has now completed them but continues to have same symptoms, and was told she could not be seen until 8/5/20.   Pt will call them back to ask for an appt with their NP.

## 2020-08-03 NOTE — TELEPHONE ENCOUNTER
Pt called back and states she is going to [facility] clinic on [address], as her pcp cannot see her sooner.

## 2020-08-05 NOTE — TELEPHONE ENCOUNTER
Pt reporting she saw PCP yesterday and was given Bactrim for a 2nd UTI. She states she is not running any fevers, and no longer with burning and frequency upon urination. Pt informed per vo Dr. Christiano Todd she can continue with C1D1 Taxol tomorrow.

## 2020-08-06 PROBLEM — D69.6 THROMBOCYTOPENIA (HCC): Status: RESOLVED | Noted: 2018-11-27 | Resolved: 2020-01-01

## 2020-08-06 NOTE — PROGRESS NOTES
Pharmacy Note- Chemotherapy Education    Koki Daniel is a  62 y. o.female  diagnosed with ovarian cancer here today for Cycle 1, Day 1 chemotherapy counseling. Ms. Yue Ward is being treated with PACLItaxel. Provided education on PACLItaxel and premedications - palonosetron, diphenhydramine, dexamethasone, famotidine. Side effects of chemotherapy reviewed included s/s infection, anemia, appetite changes, thromboyctopenia, fatigue, hair loss/alopecia, bone pain, skin and nail changes, diarrhea/constipation, infusion reactions and peripheral neuropathy. Patient given ways to manage these side effects and when to contact office. [address] Handout of medications provided to patient. Ms. Yue Ward verbalized understanding of the information presented and all of the patient's questions were answered.     Alena Real, PharmD, BCPS, BCOP    CLINICAL PHARMACY CONSULT: MED RECONCILIATION/REVIEW ADDENDUM    For Pharmacy Admin Tracking Only    PHSO: PHSO Patient?: No  Total # of Interventions Recommended: Count: 1    Total Interventions Accepted: 1  Time Spent (min): 20

## 2020-08-06 NOTE — PROGRESS NOTES
27 [facility], [address] [place] 
P [phone]  F [phone] Office Visit Patient ID: 
Name: Nathalia Mccullough MRN: [id number] : 1960/60 y.o. Visit date: 2020 LEONID Holloway is a 62 y.o.  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 The patient's previous treatment procedures include primary Taxol/carbo with retreatment in  and Doxil/Avastin, topotecan, gemzar and now single agent weekly Taxol initiated 18 following progression with chest wall involvement. Most recent EOD imaging 2018 shows overall decrease in size of numerous thoracic and abd/pelvic mets since April and . 
   
OncTx History: 
12: MARAH/BSO/Cytoreductive surgery on 12 noted carcinomatosis/omental caking. 10/2012-2013: 6 cycles Carbo/Taxol 2014-2014: 6 Cycles carbo/Taxol 3/2015-5/13/15: 3 cycles Avastin/Doxil until progression 2015-2015 Weekly Topotecan  
2015-3/2016: Weekly Topotecan 2017-2017Sherrod Simple D1/D8 Q28 days for 6 cycles 2018: CT core needle bx chest wall mass: Metastatic high-grade serous carcinoma (see comment) General Categorization Positive for malignancy. 2018-2018: Taxol weekly 2019 - 2020 [place]. Underwent right ureteral stent placement on 3/18/19. CT C/A/P 2020 with mixed response, but overall progression. Rising Ca-125 now 268. 
 2020 - initiated Taxol weekly CT Results (most recent): 
Results from [facility] Encounter encounter on 20 CT ABD PELV W CONT Narrative EXAM: CT CHEST W CONT, CT ABD PELV W CONT INDICATION: Ovarian cancer COMPARISON: 10/2/2019 CONTRAST: 100 mL of Isovue-370. TECHNIQUE:  
Following the uneventful intravenous administration of contrast, thin axial 
images were obtained through the chest, abdomen and pelvis.  Coronal and sagittal 
reconstructions were generated. Oral contrast was administered. CT dose 
reduction was achieved through use of a standardized protocol tailored for this 
examination and automatic exposure control for dose modulation. FINDINGS:  
 
THYROID: No nodule. MEDIASTINUM: Left paratracheal adenopathy now measures 3.7 x 2.0 cm increased in 
size. Mass in AP window which extends superiorly into the left infraclavicular 
region and inferiorly into the left hilum is also increase in size. When 
measured in a similar fashion this measures 3.5 x 2.4 previously 3.0 x 2.9 cm. Right parasternal mass is decreased in size measuring 3.4 x 3.4 cm. Subcarinal 
adenopathy measures 2.8 x 2.3 cm increased in size. There is increased size of 
the nodular component adjacent to the xiphoid now measuring 2.6 x 3.3 cm. SWATI: Left hilar lymph node measures 3.2 x 2.4 cm previously 2.5 x 1.8 cm. THORACIC AORTA: No dissection or aneurysm. MAIN PULMONARY ARTERY: Normal in caliber. TRACHEA/BRONCHI: Patent. ESOPHAGUS: No wall thickening or dilatation. HEART: Normal in size. PLEURA: No effusion or pneumothorax. LUNGS: No nodule, mass, or airspace disease. LIVER: Multiple masses are noted and better visualized on current study 
secondary to the addition of intravenous contrast. Comparison lesions measure 
previously, there is a 3.5 x 2.8 cm mass in the right hepatic lobe series 3 
image 58 slightly increased in size. There is a 3.6 x 2.9 cm mass in the left 
hepatic lobe slightly decreased in size series 3 image 56. Left hepatic lobe 
mass series 3 image 45 measuring 4.0 x 2.8 cm slightly increased in size. There 
is extensive tumor infiltration in the liver hilum with narrowing of the main 
portal vein as well as the intrahepatic portal veins. The common hepatic artery 
is not well visualized and likely occluded with some collateral flow noted in 
the liver hilum. GALLBLADDER: Not visualized SPLEEN: Mass in the splenic hilum increased in size measuring 7.4 x 4.3 cm with 
invasion into the splenic hilum. PANCREAS: The pancreas is encased by tumor. ADRENALS: There is likely involvement of the left adrenal gland tumor. KIDNEYS: Right nephroureteral stent with mild to moderate right-sided 
hydronephrosis. STOMACH: Tumor encasing the gastric antrum. SMALL BOWEL: No dilatation or wall thickening. COLON: Tumor abutting the tumor encasing the rectosigmoid junction. Tumor 
abutting the transverse colon. APPENDIX: Unremarkable. PERITONEUM: Some free fluid noted in the pelvis. Increased size of mesenteric 
masses. Representative examples: 7.8 x 4.3 cm tumor anterior peritoneum series 3 
image 84. No pelvic tumor 7.5 x 4.8 cm series 3 image 100. RETROPERITONEUM:  
Right pelvic sidewall increased in size. Large right lateral lymph node 
measuring 3.1 x 2.0 cm not significantly changed. REPRODUCTIVE ORGANS: The visualized URINARY BLADDER: No mass or calculus. BONES: No destructive bone lesion. ADDITIONAL COMMENTS: N/A Impression IMPRESSION: 
1. There is some variable response to therapy with several lesions decreased in 
size. However a majority of the lesions have increased in size consistent with 
progression of disease with involvement of the mediastinum, liver masses, and 
peritoneal carcinomatosis. 2.  Increase right-sided hydronephrosis. [facility] 10/2019: 
FINDINGS: 
  
THYROID: Visualized gland is unremarkable. MEDIASTINUM: Upper left paratracheal node measures 2.2 x 3.4 cm (2, 8), 
previously 2.3 x 3.5 cm. Superior prevascular node measures 2.9 x 3.0 cm, 
previously 3.5 x 3.8 cm. Poorly delineated subcarinal node measures about 3.4 x 
4.8 cm, previously 3.6 x 4.8 cm. Precardiac nodes measure 1.9 x 2.5 cm and 1.7 x 
2.4 cm (2, 39), previously 2.1 x 2.7 cm and 2.0 x 2.0 cm. SWATI: No mass or lymphadenopathy. THORACIC AORTA: No dissection or aneurysm. MAIN PULMONARY ARTERY: Normal in caliber. TRACHEA/BRONCHI: Patent. ESOPHAGUS: No wall thickening or dilatation. HEART: Normal in size. PLEURA: No effusion or pneumothorax. LUNGS: No nodule, mass, or airspace disease. LIVER: Multiple hypodense lesions suspicious for metastases, measuring up to 2.5 
x 4.0 cm in segment 2 (2, 43) series, previously 2.8 x 3.7 cm, 3.2 x 4.6 cm in 
segment 3 (2, 55), previously 3.1 x 4.5 cm, and partially exophytic from the 
posterior inferior right lobe segment 6 measuring 2.5 x 3.5 cm, previously 2.8 x 
3.4 cm. GALLBLADDER: Unremarkable. SPLEEN: Central 1.9 x 3.1 cm hypodensity (2, 47), previously 1.9 x 2.3 cm. Otherwise unremarkable. PANCREAS: Inseparable from bulky central abdominal adenopathy with no identified 
ductal dilation. ADRENALS: Unremarkable. KIDNEYS: Right hydronephrosis has improved status post right nephroureteral 
stent placement. Kidneys otherwise appear unremarkable. STOMACH: Unremarkable. SMALL BOWEL: No dilatation or wall thickening. COLON: No dilation or wall thickening. APPENDIX: Unremarkable. PERITONEUM: Extensive peritoneal implants with representative largest lesions 
measuring 4.4 x 6.1 cm and the left adnexal region (2, 98), previously 4.3 x 5.7 
cm, 3.8 x 5.9 cm in the anterior midline (2, 81), previously 3.4 x 5.4 cm, and 
2.8 x 4.7 cm in the right abdomen (2, 78), previously 3.2 x 4.7 cm. RETROPERITONEUM: Extensive rocio hepatis, retroperitoneal, and pelvic sidewall 
adenopathy. Conglomerate nkechi mass in the rocio hepatis region measures 5.7 x 
11.1 cm (2, 58) is, previously 6.3 x 12.2 cm. Aortocaval node measures 3.7 x 5.3 
cm (2, 77), previously 4.5 x 5.4 cm. Right pelvic sidewall node measures 2.9 x 
4.2 cm (2, 103), previously 3.4 x 4.2 cm. Left external iliac node measures 3.0 
x 4.8 cm (2, 101), previously 3.3 x 4.8 cm. REPRODUCTIVE ORGANS: Uterus and ovaries are surgically absent. URINARY BLADDER: No mass or calculus. Distal end of right nephroureteral stent 
positioned within bladder lumen. BONES: Degenerative spine change. No acute fracture or aggressive lesion. ADDITIONAL COMMENTS: Mass centered at the right sternocostal junction measures 5.3 x 5.4 cm (2, 21), previously 4.8 x 5.7 cm. Post surgical changes in the 
anterior abdominal wall. Left Port-A-Cath in place. Bilateral inguinal 
adenopathy measuring 2.1 x 3.2 cm on the right (2, 115), previously 2.3 x 3.7 cm 
and 2.7 x 2.8 cm on the left (2, 108), previously 2.9 x 3.1 cm. . 
  
IMPRESSION: Overall response to disease with either decrease in size or no 
significant change in extensive nkechi metastases, hepatic metastases, right 
sternal metastasis, and peritoneal carcinomatosis as above. No new metastasis or 
progressive metastasis identified. Status post right nephroureteral stent with 
interval resolution of right hydronephrosis. Lab Results Component Value Date/Time CA-125 268 (H) 06/11/2020 04:08 PM  
 Cancer Ag (CA) 125 268.0 (H) 01/08/2020 09:05 AM  
 
 
Cancer Ag (CA) 125 Date Value Ref Range Status 01/08/2020 268.0 (H) 0.0 - 38.1 U/mL Final  
  Comment:  
  [employer] Electrochemiluminescence Immunoassay (ECLIA) Values obtained with different assay methods or kits cannot be 
used interchangeably. Results cannot be interpreted as absolute 
evidence of the presence or absence of malignant disease. 09/09/2019 240.0 (H) 0.0 - 38.1 U/mL Final  
  Comment:  
  [employer] Electrochemiluminescence Immunoassay (ECLIA) Values obtained with different assay methods or kits cannot be 
used interchangeably. Results cannot be interpreted as absolute 
evidence of the presence or absence of malignant disease. 08/14/2019 312.0 (H) 0.0 - 38.1 U/mL Final  
  Comment:  
  [employer] Electrochemiluminescence Immunoassay (ECLIA) Values obtained with different assay methods or kits cannot be used interchangeably. Results cannot be interpreted as absolute 
evidence of the presence or absence of malignant disease. 07/25/2019 254.0 (H) 0.0 - 38.1 U/mL Final  
  Comment:  
  [employer] Electrochemiluminescence Immunoassay (ECLIA) Values obtained with different assay methods or kits cannot be 
used interchangeably. Results cannot be interpreted as absolute 
evidence of the presence or absence of malignant disease. 06/27/2019 252.9 (H) 0.0 - 38.1 U/mL Final  
  Comment:  
  [employer] Electrochemiluminescence Immunoassay (ECLIA) Values obtained with different assay methods or kits cannot be 
used interchangeably. Results cannot be interpreted as absolute 
evidence of the presence or absence of malignant disease. 05/07/2019 347.8 (H) 0.0 - 38.1 U/mL Final  
  Comment:  
  [employer] Electrochemiluminescence Immunoassay (ECLIA) Values obtained with different assay methods or kits cannot be 
used interchangeably. Results cannot be interpreted as absolute 
evidence of the presence or absence of malignant disease. SUBJECTIVE: 
Presents to [facility]. Overall weak, fatigued. She lives on a second floor, difficult to complete the stairs becoming more winded and light headed. Thinking of stair lift or adding on. She has various discomforts from sternum, abd, and right flank related to her ureteral stent that has caused multiple issues for her. She has f/u with her urologist and remains on UTI ppx therapy. She is taking 1g tylenol q6hr. She remains mostly independent, OOB most of day. Her sons do bring food to her room at times. She has no N/V, appetite is poor and hydrating has been more difficult. BMs are normal.  She has chronic neuropathy due to taxol and DM. Denies any change in depression, continues medication. ROS Constitutional: +weight loss, no fever, night sweats Respiratory: no cough, +ECKERT Cardiovascular: no chest pain Heme: No abnormal bleeding Gastrointestinal: no bloody stools, no constipation/diarrhea Genito-Urinary: above Musculoskeletal: + swelling in ankle - bilateral 
Neurological: mild neuropathy hands/feet. No pain Derm: pigmentation/induration medial left leg. Prior injury from fall. Psych: positive for depression - controlled PMH: 
Past Medical History:  
Diagnosis Date  Adverse effect of anesthesia   
 sleep apnea cpap machine useage  Anemia  Arthritis DDD low back and knees  Asthma   
 uses albuterol prn only; none x 6 mos. Never hospitalized  BRCA negative 2013  Calculus of kidney  Chronic kidney disease   
 kidney stones  Chronic pain   
 knee pain bilat  CINV (chemotherapy-induced nausea and vomiting) 2014  Decreased hearing, right PATIENT STATES THIS IS DUE TO CHEMOTHERAPY  Diabetes ([address] Age 46 IDDM  Environmental allergies  Fibromyalgia  GERD (gastroesophageal reflux disease)   
 controlled with med  Hx of pulmonary embolus during pregnancy 2015  Hydronephrosis due to obstruction of ureter 3/18/2019  Hypertension  Ill-defined condition Sepsis  -  SOURCE PORT  
 Ill-defined condition 2016  
 chemotherapy  Mass of chest wall 2018  Morbid obesity ([address]  Murmur, heart  Other and unspecified hyperlipidemia  Ovarian cancer ([address] 2012, 2014  
 serous, high grade, stage IIIc. s/p chemotx (has port)  Psychiatric disorder Depression/Anxiety  Rectal bleeding  Thromboembolus ([address]  POST PARTUM; resolved- PELVIS  Thyroid disease HYPOTHYROID  Unspecified sleep apnea CPAP, Dr. Saskia Cook PSH: 
Past Surgical History:  
Procedure Laterality Date  BREAST SURGERY PROCEDURE UNLISTED Lymph node in left breast 2017 Walter  
  X2  
 HX HYSTERECTOMY  2012 ROULA/BSO, tumor debulking, omentectomy for ovarian cancer  HX ORTHOPAEDIC Right 1990  
 ankle-FX, R  
 HX ORTHOPAEDIC Right 1980'S  
 FX R ARM  
 HX UROLOGICAL Right 2017 STENT FOR KIDNEY STONES  
 HX VASCULAR ACCESS  11/4/2015  
 right chest- port placed  HX VASCULAR ACCESS Left 2017 TETE CATH  
 
SOC: 
Social History Socioeconomic History  Marital status:  Spouse name: Not on file  Number of children: Not on file  Years of education: Not on file  Highest education level: Not on file Occupational History  Not on file Social Needs  Financial resource strain: Not on file  Food insecurity Worry: Not on file Inability: Not on file  Transportation needs Medical: Not on file Non-medical: Not on file Tobacco Use  Smoking status: Never Smoker  Smokeless tobacco: Never Used Substance and Sexual Activity  Alcohol use: Yes Comment: 1 drink per month  Drug use: No  
 Sexual activity: Yes Lifestyle  Physical activity Days per week: Not on file Minutes per session: Not on file  Stress: Not on file Relationships  Social connections Talks on phone: Not on file Gets together: Not on file Attends Taoist service: Not on file Active member of club or organization: Not on file Attends meetings of clubs or organizations: Not on file Relationship status: Not on file  Intimate partner violence Fear of current or ex partner: Not on file Emotionally abused: Not on file Physically abused: Not on file Forced sexual activity: Not on file Other Topics Concern  Not on file Social History Narrative Lives in [facility] alone.  passed away in 5/16 of a heart attack. Has 2 sons. Disability. 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 Enjoys swimming and going to the gym. Family History Family History Problem Relation Age of Onset  Hypertension Mother [facility] Arthritis-osteo Mother  Hypertension Father  Arthritis-osteo Father  Cancer Father PROSTATE  COPD Father  Hypertension Brother  Elevated Lipids Brother  Arthritis-osteo Brother  Hypertension Brother  Elevated Lipids Brother  Obesity Brother  Cancer Brother PROSTATE  Anesth Problems Son DELAYED AWAKENING  
 Diabetes Maternal Grandmother  Anesth Problems Paternal Grandmother PATIENT STATES GRANDMOTHER'S HEART STOPPED DURING SURGERY Medications: (reviewed) Current Outpatient Medications on File Prior to Encounter Medication Sig Dispense Refill  SULFAMETHOXAZOLE-TRIMETHOPRIM PO Take  by mouth two (2) times a day. 7 days  dexAMETHasone (DECADRON) 4 mg tablet Take 2 tablets with breakfast the day before chemo and for 2 days after chemo 36 Tab 2  
 lidocaine-prilocaine (EMLA) topical cream Apply small amount over port area and cover with band aid one hour before chemo 30 g 3  
 ondansetron (ZOFRAN ODT) 4 mg disintegrating tablet Take 1 Tab by mouth every eight (8) hours as needed for Nausea. Indications: nausea and vomiting caused by cancer drugs 30 Tab 2  
 [DISCONTINUED] amoxicillin (AMOXIL) 500 mg capsule  predniSONE (DELTASONE) 50 mg tablet Take 1 tablet 13 hours prior to scan, take 1 tablet 7 hours prior to scan and take 1 tablet one hour prior to scan 3 Tab 0  Zejula 100 mg cap Take 100 mg by mouth daily. 60 Cap 4  
 fluticasone propionate (FLONASE) 50 mcg/actuation nasal spray 2 SPRAYS IN BOTH NOSTRILS DAILY 1 Bottle 0  
 [DISCONTINUED] amoxicillin-clavulanate (AUGMENTIN) 875-125 mg per tablet  omega-3 acid ethyl esters (LOVAZA) 1 gram capsule TAKE 1 CAPSULE BY MOUTH TWICE A  Cap 2  cycloSPORINE (RESTASIS) 0.05 % dpet  albuterol (PROVENTIL VENTOLIN) 2.5 mg /3 mL (0.083 %) nebu Take 2.5 mg by inhalation.     
 dextroamphetamine-amphetamine (ADDERALL) 20 mg tablet TAKE 1 TABLET BY MOUTH EVERY DAY  0  
  rosuvastatin (CRESTOR) 10 mg tablet TAKE 1 TABLET BY MOUTH EVERY DAY  0  
 telmisartan (MICARDIS) 20 mg tablet TAKE 1 TABLET BY MOUTH EVERY DAY 90 Tab 3  
 atorvastatin (LIPITOR) 20 mg tablet  BD ULTRA-FINE SHORT PEN NEEDLE 31 gauge x 5/16\" ndle USE TO INJECT INSULIN 5 TIMES DAILY 200 Pen Needle 11  
 insulin aspart U-100 (NOVOLOG FLEXPEN U-100 INSULIN) 100 unit/mL (3 mL) inpn INJECT 20 UNITS BEFORE EACH MEAL + 4 UNITS FOR EVERY 50 MG/DL ABOVE 150 MG/DL.  UNITS PER DAY 90 Adjustable Dose Pre-filled Pen Syringe 3  
 LEVEMIR FLEXTOUCH U-100 INSULN 100 unit/mL (3 mL) inpn INJECT 30 UNITS IN AM AND 50 UNITS AT NIGHT. INCREASE AS DIRECTED TO  UNITS/DAY. 90 Adjustable Dose Pre-filled Pen Syringe 3  DEXILANT 60 mg CpDB capsule (delayed release) TAKE 1 CAPSULE BY MOUTH EVERY DAY 90 Cap 3  
 LINZESS 145 mcg cap capsule TAKE 1 CAPSULE BY MOUTH EVERY DAYY 30 Cap 0  
 levothyroxine (SYNTHROID) 150 mcg tablet TAKE 1 TABLET BY MOUTH EVERY DAY BEFORE BREAKFAST 90 Tab 0  
 EPINEPHrine (EPIPEN) 0.3 mg/0.3 mL injection INJECT 0.3 ML BY INTRAMUSCULAR ROUTE ONCE AS NEEDED FOR UP TO 1 DOSE.  1  
 glucose blood VI test strips (TRUE METRIX GLUCOSE TEST STRIP) strip by Does Not Apply route See Admin Instructions. 30 Strip 5  
 lancets misc by Does Not Apply route daily. True Metrix lancets- test blood sugar once per day 30 Each 5  Blood-Glucose Meter monitoring kit Check up to tid, as directed 1 Kit 0  
 azelastine (ASTELIN) 137 mcg (0.1 %) nasal spray 1 Barton by Both Nostrils route daily as needed. Use in each nostril as directed  insulin detemir U-100 (LEVEMIR) 100 unit/mL injection 20 Units by SubCUTAneous route daily (with lunch).  montelukast (SINGULAIR) 10 mg tablet Take 10 mg by mouth nightly.  ascorbic acid, vitamin C, (VITAMIN C) 1,000 mg tablet Take 1,000 mg by mouth daily.     
 albuterol (PROVENTIL VENTOLIN) 2.5 mg /3 mL (0.083 %) nebulizer solution 2.5 mg by Nebulization route every four (4) hours as needed for Wheezing.  Liraglutide (VICTOZA) 0.6 mg/0.1 mL (18 mg/3 mL) pnij 1.8 mg by SubCUTAneous route daily (with lunch). 3 Pen 3  
 SYMBICORT 160-4.5 mcg/actuation HFAA Take 2 Puffs by inhalation two (2) times daily as needed. 2 Inhaler 3  clonazePAM (KLONOPIN) 0.5 mg tablet Take 0.5 mg by mouth nightly as needed.  sertraline (ZOLOFT) 50 mg tablet TAKE 1 TABLET BY MOUTH as needed  1  cholecalciferol, VITAMIN D3, (VITAMIN D3) 5,000 unit tab tablet Take 5,000 Units by mouth daily.  furosemide (LASIX) 40 mg tablet Take 40 mg by mouth every other day. 3 No current facility-administered medications on file prior to encounter. Allergies: (reviewed) Allergies Allergen Reactions  Carboplatin Other (comments) Patient developed shortness of breath, felt faint, coughing, skin flushed and \"itchy\". This occurred on the patients 8th treatment.  Iodinated Contrast Media Rash 13 hr pre-medication prior to IV contrast.  
Patient has done well with 1 hr pre-medication of (Solu-Medrol) 40mg &  (Benadryl) 50mg.  Lipitor [Atorvastatin] Myalgia  Shellfish Containing Products Hives  Tape [Adhesive] Itching and Other (comments) \"op site-- clear,thin tape\"--caused blister  Tomato Hives  Iodine And Iodide Containing Products Rash  Olmesartan Other (comments)  Losartan Other (comments)  
  headaches  Percocet [Oxycodone-Acetaminophen] Other (comments) Fever; however, current home med OBJECTIVE Physical Exam 
Visit Vitals /88 Pulse 90 Temp 97.5 °F (36.4 °C) Resp 18 Ht 5' 8.9\" (1.75 m) Wt 243 lb (110.2 kg) LMP 09/07/2011 BMI 35.99 kg/m² GENERAL BRIAN: Conversant, alert, oriented. NAD HEENT: Oropharynx clear. Gingiva w/o edema/erythemano JVD. No adenopathy RESPIRATORY: Lung CTA, No rales/rhonchi. Adequate respiratory effort. CARDIOVASC: Reg rate/rhythm, stable murmur low grade. No visible, grossly palpable chest lesion. GASTROINT: soft, non-tender, without masses or organomegaly, no fluid wave. Habitus limits exam.  
MUSCULOSKEL: No focal tenderness. EXTREMITIES: bilat 1+ edema/girth nonpitting LACON SURVEY: Negative NEURO: Grossly intact, no acute deficit. Oriented. Not depressed. Not agitated. Wt Readings from Last 3 Encounters:  
08/06/20 243 lb (110.2 kg) 02/19/20 274 lb 3.2 oz (124.4 kg) 01/28/20 274 lb (124.3 kg) DATE REVIEW as available: 
Lab Results Component Value Date/Time WBC 2.8 (L) 08/06/2020 08:12 AM  
 Hemoglobin (POC) 10.5 (L) 03/20/2017 12:15 PM  
 HGB 7.6 (L) 08/06/2020 08:12 AM  
 Hematocrit (POC) 31 (L) 03/20/2017 12:15 PM  
 HCT 25.3 (L) 08/06/2020 08:12 AM  
 PLATELET 107 96/95/2261 08:12 AM  
 MCV 95.8 08/06/2020 08:12 AM  
 
Lab Results Component Value Date/Time  
 ABS. NEUTROPHILS 2.1 08/06/2020 08:12 AM  
 
Lab Results Component Value Date/Time Sodium 137 08/06/2020 08:12 AM  
 Potassium 5.0 08/06/2020 08:12 AM  
 Chloride 111 (H) 08/06/2020 08:12 AM  
 CO2 18 (L) 08/06/2020 08:12 AM  
 Anion gap 8 08/06/2020 08:12 AM  
 Glucose 172 (H) 08/06/2020 08:12 AM  
 BUN 31 (H) 08/06/2020 08:12 AM  
 Creatinine 1.87 (H) 08/06/2020 08:12 AM  
 BUN/Creatinine ratio 17 08/06/2020 08:12 AM  
 GFR est AA 33 (L) 08/06/2020 08:12 AM  
 GFR est non-AA 28 (L) 08/06/2020 08:12 AM  
 Calcium 9.1 08/06/2020 08:12 AM  
 Bilirubin, total 0.4 08/06/2020 08:12 AM  
 Alk. phosphatase 215 (H) 08/06/2020 08:12 AM  
 Protein, total 8.9 (H) 08/06/2020 08:12 AM  
 Albumin 3.4 (L) 08/06/2020 08:12 AM  
 Globulin 5.5 (H) 08/06/2020 08:12 AM  
 A-G Ratio 0.6 (L) 08/06/2020 08:12 AM  
 ALT (SGPT) 66 08/06/2020 08:12 AM  
 
 
ECHO 1/2020 · Normal cavity size and systolic function (ejection fraction normal). Mild concentric hypertrophy.  Estimated left ventricular ejection fraction is 55 - 60%. Visually measured ejection fraction. No regional wall motion abnormality noted. · Mildly dilated left atrium. · Mild aortic valve sclerosis. · Trace mitral valve regurgitation is present. IMPRESSION AND PLAN: 
62 y.o. with diffusely metastatic ovarian cancer. Continues on palliative chemo, retrail taxol C1 today CKD stage 2-3 - stent in place, looks dry. Gentle hydration today Right hydronephrosis - stent in place Liver mets - Stop high dose tylenol for now. Monitor liver studies. Chronic pain - Multiple sources chest and right abd primarily. Tolerated Norco in the past for pain Rx sent. May need to change at some point to dilaudid if worsens. Stool softeners Anemia - persistent d/t multiple causes with her diseases, chemo hx. Following. SOB/debility - hx Asthma, controlled on meds. Encouraged to consider chair lift. Consider stimulants PRN 
IDDM2/SHABANA/depression - controlled overall. CIPN/diabetic polyneuropathy - not in pain, monitor. Discussed home safety. Wt loss, prot smiley malnutrition - supplements discussed Psychosocial - she has some fear about the future. She is well supported, isaac remains very important. Monitor depressive sx mgmt. Lennox Mallick, PA-C 
8/6/2020

## 2020-08-06 NOTE — PROGRESS NOTES
[facility]fusion [facility] Note: 
Pt arrived at [facility] ambulatory and in no distress for C1. Assessment stable, no new complaints voiced. Medications received: 
Medications Administered 0.9% sodium chloride infusion Admin Date 08/06/2020 Action New Bag Dose 25 mL/hr Rate 25 mL/hr Route IntraVENous Administered By 
Blayne Butcher RN  
  
  
 0.9% sodium chloride injection 10 mL Admin Date 08/06/2020 Action Given Dose 
10 mL Route IntraVENous Administered By 
Blayne Butcher RN Admin Date 08/06/2020 Action Given Dose 
10 mL Route IntraVENous Administered By 
Blayne Butcher RN  
  
  
 dexamethasone (DECADRON) 12 mg in 0.9% sodium chloride 50 mL, overfill volume 5 mL IVPB Admin Date 08/06/2020 Action Given Dose 
12 mg Rate 232 mL/hr Route IntraVENous Administered By 
Blayne Butcher RN  
  
  
 diphenhydrAMINE (BENADRYL) injection 50 mg   
 Admin Date 08/06/2020 Action Given Dose 50 mg Route IntraVENous Administered By 
Blayne Butcher RN  
  
  
 famotidine (PF) (PEPCID) 20 mg in 0.9% sodium chloride 10 mL injection Admin Date 08/06/2020 Action Given Dose 
20 mg Route IntraVENous Administered By 
Blayne Butcher RN  
  
  
 heparin (porcine) pf 300-500 Units Admin Date 08/06/2020 Action Given Dose 
500 Units Route InterCATHeter Administered By 
Blayne Butcher RN  
  
  
 PACLitaxeL (TAXOL) 197 mg in 0.9% sodium chloride 250 mL, overfill volume 25 mL chemo infusion Admin Date 08/06/2020 Action New Bag Dose 
197 mg Rate 
307.8 mL/hr Route IntraVENous Administered By 
Blayne Butcher RN  
  
  
 palonosetron HCl (ALOXI) injection 0.25 mg   
 Admin Date 08/06/2020 Action Given Dose 0.25 mg Route IntraVENous Administered By 
Blayne Butcher RN  
  
  
 saline peripheral flush soln 10 mL Admin Date 08/06/2020 Action Given Dose 
10 mL Route InterCATHeter Administered By 
Blayne Butcher RN  
  
  
  
 
 
 
 1300 Tolerated treatment well, no adverse reaction noted. D/Cd from [facility] ambulatory and in no distress accompanied by self. Next appt 8/13 Visit Vitals /88 Pulse 90 Temp 97.5 °F (36.4 °C) Resp 18 Ht 5' 8.9\" (1.75 m) Wt 110.2 kg (243 lb) LMP 09/07/2011 BMI 35.99 kg/m² Recent Results (from the past 12 hour(s)) CBC WITH AUTOMATED DIFF Collection Time: 08/06/20  8:12 AM  
Result Value Ref Range WBC 2.8 (L) 3.6 - 11.0 K/uL  
 RBC 2.64 (L) 3.80 - 5.20 M/uL HGB 7.6 (L) 11.5 - 16.0 g/dL HCT 25.3 (L) 35.0 - 47.0 % MCV 95.8 80.0 - 99.0 FL  
 MCH 28.8 26.0 - 34.0 PG  
 MCHC 30.0 30.0 - 36.5 g/dL  
 RDW 13.9 11.5 - 14.5 % PLATELET 334 698 - 152 K/uL MPV 8.9 8.9 - 12.9 FL  
 NRBC 0.0 0  WBC ABSOLUTE NRBC 0.00 0.00 - 0.01 K/uL NEUTROPHILS 72 32 - 75 % LYMPHOCYTES 19 12 - 49 % MONOCYTES 8 5 - 13 % EOSINOPHILS 0 0 - 7 % BASOPHILS 1 0 - 1 % IMMATURE GRANULOCYTES 0 0.0 - 0.5 % ABS. NEUTROPHILS 2.1 1.8 - 8.0 K/UL  
 ABS. LYMPHOCYTES 0.5 (L) 0.8 - 3.5 K/UL  
 ABS. MONOCYTES 0.2 0.0 - 1.0 K/UL  
 ABS. EOSINOPHILS 0.0 0.0 - 0.4 K/UL  
 ABS. BASOPHILS 0.0 0.0 - 0.1 K/UL  
 ABS. IMM. GRANS. 0.0 0.00 - 0.04 K/UL  
 DF SMEAR SCANNED    
 RBC COMMENTS OVALOCYTES PRESENT 
    
 RBC COMMENTS DIPTI CELLS 
PRESENT 
    
METABOLIC PANEL, COMPREHENSIVE Collection Time: 08/06/20  8:12 AM  
Result Value Ref Range Sodium 137 136 - 145 mmol/L Potassium 5.0 3.5 - 5.1 mmol/L Chloride 111 (H) 97 - 108 mmol/L  
 CO2 18 (L) 21 - 32 mmol/L Anion gap 8 5 - 15 mmol/L Glucose 172 (H) 65 - 100 mg/dL BUN 31 (H) 6 - 20 MG/DL Creatinine 1.87 (H) 0.55 - 1.02 MG/DL  
 BUN/Creatinine ratio 17 12 - 20 GFR est AA 33 (L) >60 ml/min/1.73m2 GFR est non-AA 28 (L) >60 ml/min/1.73m2 Calcium 9.1 8.5 - 10.1 MG/DL Bilirubin, total 0.4 0.2 - 1.0 MG/DL  
 ALT (SGPT) 66 12 - 78 U/L  
 AST (SGOT) 65 (H) 15 - 37 U/L Alk.  phosphatase 215 (H) 45 - 117 U/L  
 Protein, total 8.9 (H) 6.4 - 8.2 g/dL Albumin 3.4 (L) 3.5 - 5.0 g/dL Globulin 5.5 (H) 2.0 - 4.0 g/dL A-G Ratio 0.6 (L) 1.1 - 2.2 MAGNESIUM Collection Time: 08/06/20  8:12 AM  
Result Value Ref Range Magnesium 1.8 1.6 - 2.4 mg/dL

## 2020-08-13 NOTE — PROGRESS NOTES
[facility] Chemotherapy Progress Note Date: 2020 Name: Christina Oliveira MRN: [id number] : 1960 
 
 
0800 Pt admit to [facility] for Taxol and blood ambulatory in stable condition. Assessment completed. No new concerns voiced. POrt with positive blood return. Chemotherapy Flowsheet 2020 Cycle C1D8 Date 2020 Drug / Regimen Taxol Dosage -  
Pre Hydration -  
Pre Meds given Notes given Ms. Moore's vitals were reviewed. Patient Vitals for the past 12 hrs: 
 Temp Pulse Resp BP  
20 1815 98.3 °F (36.8 °C) 72 18 128/69  
20 1800 98.3 °F (36.8 °C) 70  125/80  
20 1720 98.6 °F (37 °C) 77 18 125/89  
20 1620 98 °F (36.7 °C) 72 18 128/74  
20 1550 98.2 °F (36.8 °C) 61 18 111/62  
20 1535 98 °F (36.7 °C) 62 18 115/63  
20 1515 98 °F (36.7 °C) 67 18 122/67  
20 0802 97.1 °F (36.2 °C) (!) 103 18 135/75 Lab results were obtained and reviewed. Recent Results (from the past 12 hour(s)) CBC WITH AUTOMATED DIFF Collection Time: 20  8:07 AM  
Result Value Ref Range WBC 3.7 3.6 - 11.0 K/uL  
 RBC 2.47 (L) 3.80 - 5.20 M/uL HGB 7.1 (L) 11.5 - 16.0 g/dL HCT 24.1 (L) 35.0 - 47.0 % MCV 97.6 80.0 - 99.0 FL  
 MCH 28.7 26.0 - 34.0 PG  
 MCHC 29.5 (L) 30.0 - 36.5 g/dL  
 RDW 13.9 11.5 - 14.5 % PLATELET 901 313 - 562 K/uL MPV 9.7 8.9 - 12.9 FL  
 NRBC 0.0 0  WBC ABSOLUTE NRBC 0.00 0.00 - 0.01 K/uL NEUTROPHILS 63 32 - 75 % LYMPHOCYTES 26 12 - 49 % MONOCYTES 7 5 - 13 % EOSINOPHILS 2 0 - 7 % BASOPHILS 1 0 - 1 % IMMATURE GRANULOCYTES 1 (H) 0.0 - 0.5 % ABS. NEUTROPHILS 2.3 1.8 - 8.0 K/UL  
 ABS. LYMPHOCYTES 1.0 0.8 - 3.5 K/UL  
 ABS. MONOCYTES 0.3 0.0 - 1.0 K/UL  
 ABS. EOSINOPHILS 0.1 0.0 - 0.4 K/UL  
 ABS. BASOPHILS 0.0 0.0 - 0.1 K/UL  
 ABS. IMM. GRANS. 0.0 0.00 - 0.04 K/UL  
 DF AUTOMATED METABOLIC PANEL, BASIC  Collection Time: 20  8:07 AM  
 Result Value Ref Range Sodium 138 136 - 145 mmol/L Potassium 5.3 (H) 3.5 - 5.1 mmol/L Chloride 112 (H) 97 - 108 mmol/L  
 CO2 19 (L) 21 - 32 mmol/L Anion gap 7 5 - 15 mmol/L Glucose 152 (H) 65 - 100 mg/dL BUN 50 (H) 6 - 20 MG/DL Creatinine 1.98 (H) 0.55 - 1.02 MG/DL  
 BUN/Creatinine ratio 25 (H) 12 - 20 GFR est AA 31 (L) >60 ml/min/1.73m2 GFR est non-AA 26 (L) >60 ml/min/1.73m2 Calcium 9.2 8.5 - 10.1 MG/DL  
TYPE + CROSSMATCH Collection Time: 08/13/20 12:15 PM  
Result Value Ref Range Crossmatch Expiration 08/16/2020 ABO/Rh(D) O POSITIVE Antibody screen NEG Unit number [id number] Blood component type RC LR Unit division 00 Status of unit ISSUED Crossmatch result Compatible Pre-medications  were administered as ordered and chemotherapy was initiated. Medications Administered 0.9% sodium chloride infusion Admin Date 
08/13/2020 Action New Bag Dose 25 mL/hr Rate 25 mL/hr Route IntraVENous Administered By 
Anthony Liu RN  
  
  
 acetaminophen (TYLENOL) tablet 650 mg   
 Admin Date 
08/13/2020 Action Given Dose 650 mg Route Oral Administered By 
Anthony Liu RN  
  
  
 dexamethasone (DECADRON) 12 mg in 0.9% sodium chloride 50 mL, overfill volume 5 mL IVPB Admin Date 
08/13/2020 Action Given Dose 
12 mg Rate 232 mL/hr Route IntraVENous Administered By 
Anthony Liu RN  
  
  
 diphenhydrAMINE (BENADRYL) capsule 25 mg   
 Admin Date 
08/13/2020 Action Given Dose 
25 mg Route Oral Administered By 
Anthony Liu RN  
  
  
 diphenhydrAMINE (BENADRYL) injection 50 mg   
 Admin Date 
08/13/2020 Action Given Dose 50 mg Route IntraVENous Administered By 
Anthony Liu RN  
  
  
 famotidine (PF) (PEPCID) 20 mg in 0.9% sodium chloride 10 mL injection Admin Date 
08/13/2020 Action Given Dose 
20 mg Route IntraVENous Administered By 
Anthony Liu RN  
  
  
 PACLitaxeL (TAXOL) 197 mg in 0.9% sodium chloride 250 mL, overfill volume 25 mL chemo infusion Admin Date 
08/13/2020 Action New Bag Dose 
197 mg Rate 
307.8 mL/hr Route IntraVENous Administered By 
Jacoby Ya RN  
  
  
 palonosetron HCl (ALOXI) injection 0.25 mg   
 Admin Date 
08/13/2020 Action Given Dose 0.25 mg Route IntraVENous Administered By 
Jacoby Ya RN  
  
  
 sodium chloride 0.9 % bolus infusion 500 mL Admin Date 
08/13/2020 Action New Bag Dose 
500 mL Rate 
1,000 mL/hr Route IntraVENous Administered By 
Jacoby Ya RN Outpatient Infusion Center Progress Note Visit Vitals /69 Pulse 72 Temp 98.3 °F (36.8 °C) Resp 18 Ht 5' 8\" (1.727 m) Wt 110.1 kg (242 lb 12.8 oz) LMP 09/07/2011 BMI 36.92 kg/m² Medications: 
Tylenol Benadryl 1520 First unit initiated. 1800 First unit completed. Pt monitored post transfusion with no s/s of reaction. Patient declined to stay full hour post infusion. Educated and provided with written material regarding s/s of delayed reaction to watch for at home. 1815 Pt tolerated treatment well. D/c home ambulatory in no distress. 1815 Pt tolerated treatment well. Port maintained positive blood return throughout treatment. Flushed, heparinized and de-accessed per protocol. D/c home ambulatory in no distress. Future Appointments Date Time Provider Gabriel Phillip 8/20/2020  8:00 AM A1 [facility]  
8/25/2020  2:00 PM MD LUCIEN Koehler  
9/3/2020  8:00 AM A1 [facility] [facility]  
9/10/2020  8:00 AM A1 [facility] [facility]  
9/17/2020  8:00 AM A1 [facility] [facility]  
9/22/2020  2:00 PM MD LUCIEN Koehler BS CHIKI Rendon RN August 13, 2020

## 2020-08-13 NOTE — PROGRESS NOTES
[address], [address] [place], [facility] 
P [phone]  F [phone] Office Visit Patient ID: 
Name: Jaleesa Herzog MRN: [id number] : 1960/60 y.o. Visit date: 2020 LEONID Chaudhari is a 62 y.o.  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 The patient's previous treatment include primary debulking and adjuvant Taxol/carbo with retreatment in  for recurrence. Subsequent lines of chemo as outlined below. She has most recently started single agent weekly Taxol on 18 following progression with chest wall involvement while on PARPi. Most recent EOD imaging in 2020 shows a variable response to her prior therapy though with mostly increased disease including liver, mediastium and abd with progressive right hydronephrosis. She is s/p stent placement.   
   
OncTx History: 
12: MARAH/BSO/Cytoreductive surgery on 12 noted carcinomatosis/omental caking. 10/2012-2013: 6 cycles Carbo/Taxol 2014-2014: 6 Cycles carbo/Taxol 3/2015-5/13/15: 3 cycles Avastin/Doxil until progression 2015-2015 Weekly Topotecan  
2015-3/2016: Weekly Topotecan 2017-2017Broadus Hamper D1/D8 Q28 days for 6 cycles 2018: CT core needle bx chest wall mass: Metastatic high-grade serous carcinoma (see comment) Positive for malignancy. 2018-2018: Taxol weekly 2019: 10 fx 30cGy palliative RT to sternal lesion 2019 - 2020 [place]. Underwent right ureteral stent placement on 3/18/19. CT C/A/P 2020 with mixed response, but overall progression. Rising Ca-125 now 268. 
2020: CT CAP variable response to therapy with several lesions decreased in size. However a majority of the lesions have increased in size consistent with progression of disease with involvement of the mediastinum, liver masses, and peritoneal carcinomatosis.  Increase right-sided hydronephrosis. Juanito Marin 8/2020 - initiated Taxol weekly CT Results (most recent): 
Results from [facility] - [facility] Encounter encounter on 06/24/20 CT ABD PELV W CONT Narrative EXAM: CT CHEST W CONT, CT ABD PELV W CONT INDICATION: Ovarian cancer COMPARISON: 10/2/2019 CONTRAST: 100 mL of Isovue-370. TECHNIQUE:  
Following the uneventful intravenous administration of contrast, thin axial 
images were obtained through the chest, abdomen and pelvis. Coronal and sagittal 
reconstructions were generated. Oral contrast was administered. CT dose 
reduction was achieved through use of a standardized protocol tailored for this 
examination and automatic exposure control for dose modulation. FINDINGS:  
 
THYROID: No nodule. MEDIASTINUM: Left paratracheal adenopathy now measures 3.7 x 2.0 cm increased in 
size. Mass in AP window which extends superiorly into the left infraclavicular 
region and inferiorly into the left hilum is also increase in size. When 
measured in a similar fashion this measures 3.5 x 2.4 previously 3.0 x 2.9 cm. Right parasternal mass is decreased in size measuring 3.4 x 3.4 cm. Subcarinal 
adenopathy measures 2.8 x 2.3 cm increased in size. There is increased size of 
the nodular component adjacent to the xiphoid now measuring 2.6 x 3.3 cm. SWATI: Left hilar lymph node measures 3.2 x 2.4 cm previously 2.5 x 1.8 cm. THORACIC AORTA: No dissection or aneurysm. MAIN PULMONARY ARTERY: Normal in caliber. TRACHEA/BRONCHI: Patent. ESOPHAGUS: No wall thickening or dilatation. HEART: Normal in size. PLEURA: No effusion or pneumothorax. LUNGS: No nodule, mass, or airspace disease. LIVER: Multiple masses are noted and better visualized on current study 
secondary to the addition of intravenous contrast. Comparison lesions measure 
previously, there is a 3.5 x 2.8 cm mass in the right hepatic lobe series 3 
image 58 slightly increased in size. There is a 3.6 x 2.9 cm mass in the left hepatic lobe slightly decreased in size series 3 image 56. Left hepatic lobe 
mass series 3 image 45 measuring 4.0 x 2.8 cm slightly increased in size. There 
is extensive tumor infiltration in the liver hilum with narrowing of the main 
portal vein as well as the intrahepatic portal veins. The common hepatic artery 
is not well visualized and likely occluded with some collateral flow noted in 
the liver hilum. GALLBLADDER: Not visualized SPLEEN: Mass in the splenic hilum increased in size measuring 7.4 x 4.3 cm with 
invasion into the splenic hilum. PANCREAS: The pancreas is encased by tumor. ADRENALS: There is likely involvement of the left adrenal gland tumor. KIDNEYS: Right nephroureteral stent with mild to moderate right-sided 
hydronephrosis. STOMACH: Tumor encasing the gastric antrum. SMALL BOWEL: No dilatation or wall thickening. COLON: Tumor abutting the tumor encasing the rectosigmoid junction. Tumor 
abutting the transverse colon. APPENDIX: Unremarkable. PERITONEUM: Some free fluid noted in the pelvis. Increased size of mesenteric 
masses. Representative examples: 7.8 x 4.3 cm tumor anterior peritoneum series 3 
image 84. No pelvic tumor 7.5 x 4.8 cm series 3 image 100. RETROPERITONEUM:  
Right pelvic sidewall increased in size. Large right lateral lymph node 
measuring 3.1 x 2.0 cm not significantly changed. REPRODUCTIVE ORGANS: The visualized URINARY BLADDER: No mass or calculus. BONES: No destructive bone lesion. ADDITIONAL COMMENTS: N/A Impression IMPRESSION: 
1. There is some variable response to therapy with several lesions decreased in 
size. However a majority of the lesions have increased in size consistent with 
progression of disease with involvement of the mediastinum, liver masses, and 
peritoneal carcinomatosis. 2.  Increase right-sided hydronephrosis. [facility] 10/2019: 
FINDINGS: 
THYROID: Visualized gland is unremarkable. MEDIASTINUM: Upper left paratracheal node measures 2.2 x 3.4 cm (2, 8), 
previously 2.3 x 3.5 cm. Superior prevascular node measures 2.9 x 3.0 cm, 
previously 3.5 x 3.8 cm. Poorly delineated subcarinal node measures about 3.4 x 
4.8 cm, previously 3.6 x 4.8 cm. Precardiac nodes measure 1.9 x 2.5 cm and 1.7 x 
2.4 cm (2, 39), previously 2.1 x 2.7 cm and 2.0 x 2.0 cm. SWATI: No mass or lymphadenopathy. THORACIC AORTA: No dissection or aneurysm. MAIN PULMONARY ARTERY: Normal in caliber. TRACHEA/BRONCHI: Patent. ESOPHAGUS: No wall thickening or dilatation. HEART: Normal in size. PLEURA: No effusion or pneumothorax. LUNGS: No nodule, mass, or airspace disease. LIVER: Multiple hypodense lesions suspicious for metastases, measuring up to 2.5 
x 4.0 cm in segment 2 (2, 43) series, previously 2.8 x 3.7 cm, 3.2 x 4.6 cm in 
segment 3 (2, 55), previously 3.1 x 4.5 cm, and partially exophytic from the 
posterior inferior right lobe segment 6 measuring 2.5 x 3.5 cm, previously 2.8 x 
3.4 cm. GALLBLADDER: Unremarkable. SPLEEN: Central 1.9 x 3.1 cm hypodensity (2, 47), previously 1.9 x 2.3 cm. Otherwise unremarkable. PANCREAS: Inseparable from bulky central abdominal adenopathy with no identified 
ductal dilation. ADRENALS: Unremarkable. KIDNEYS: Right hydronephrosis has improved status post right nephroureteral 
stent placement. Kidneys otherwise appear unremarkable. STOMACH: Unremarkable. SMALL BOWEL: No dilatation or wall thickening. COLON: No dilation or wall thickening. APPENDIX: Unremarkable. PERITONEUM: Extensive peritoneal implants with representative largest lesions 
measuring 4.4 x 6.1 cm and the left adnexal region (2, 98), previously 4.3 x 5.7 
cm, 3.8 x 5.9 cm in the anterior midline (2, 81), previously 3.4 x 5.4 cm, and 
2.8 x 4.7 cm in the right abdomen (2, 78), previously 3.2 x 4.7 cm. RETROPERITONEUM: Extensive rocio hepatis, retroperitoneal, and pelvic sidewall adenopathy. Conglomerate nkechi mass in the rocio hepatis region measures 5.7 x 
11.1 cm (2, 58) is, previously 6.3 x 12.2 cm. Aortocaval node measures 3.7 x 5.3 
cm (2, 77), previously 4.5 x 5.4 cm. Right pelvic sidewall node measures 2.9 x 
4.2 cm (2, 103), previously 3.4 x 4.2 cm. Left external iliac node measures 3.0 
x 4.8 cm (2, 101), previously 3.3 x 4.8 cm. REPRODUCTIVE ORGANS: Uterus and ovaries are surgically absent. URINARY BLADDER: No mass or calculus. Distal end of right nephroureteral stent 
positioned within bladder lumen. BONES: Degenerative spine change. No acute fracture or aggressive lesion. ADDITIONAL COMMENTS: Mass centered at the right sternocostal junction measures 5.3 x 5.4 cm (2, 21), previously 4.8 x 5.7 cm. Post surgical changes in the 
anterior abdominal wall. Left Port-A-Cath in place. Bilateral inguinal 
adenopathy measuring 2.1 x 3.2 cm on the right (2, 115), previously 2.3 x 3.7 cm 
and 2.7 x 2.8 cm on the left (2, 108), previously 2.9 x 3.1 cm. . 
  
IMPRESSION: Overall response to disease with either decrease in size or no 
significant change in extensive nkechi metastases, hepatic metastases, right 
sternal metastasis, and peritoneal carcinomatosis as above. No new metastasis or 
progressive metastasis identified. Status post right nephroureteral stent with 
interval resolution of right hydronephrosis. SUBJECTIVE: 
Presents to Health system. Overall weak, fatigued. Pain improved with Norco, using BID well controlled. No N/V. +BM daily to every other day with miralax. She has various discomforts from sternum, abd, and right flank related to her ureteral stent that has caused multiple issues for her. She has f/u with her urologist today for stent exchange. She remains mostly independent, OOB most of day. She reports completes chores and all self care, though gets winded at times.  She lives on a second floor, difficult to complete the stairs becoming more winded and light headed. Thinking of stair lift or adding on. and remains on UTI ppx therapy. Her sons do bring food to her room at times. She has no N/V, appetite is overall poor. Feels she is drinking 4 12oz gonzalez/day. She has chronic pedal/hand numbness/tingling due to taxol and DM. Denies pain. Wakes from sleep occasionally d/t this. Resumes sleep easily. Denies any change in depression, continues medication. ROS Constitutional: +weight loss, no fever, night sweats Respiratory: no cough, +ECKERT Cardiovascular: sternal pain c/w disease Heme: No abnormal bleeding Gastrointestinal: no bloody stools, no constipation/diarrhea Genito-Urinary: above Musculoskeletal: + swelling in ankle - bilateral 
Neurological: mild neuropathy hands/feet. No pain Derm: pigmentation/induration medial left leg. Prior injury from fall. Psych: positive for depression - controlled PMH: 
Past Medical History:  
Diagnosis Date  Adverse effect of anesthesia   
 sleep apnea cpap machine useage  Anemia  Arthritis DDD low back and knees  Asthma   
 uses albuterol prn only; none x 6 mos. Never hospitalized  BRCA negative 1/2013  Calculus of kidney  Chronic kidney disease   
 kidney stones  Chronic pain   
 knee pain bilat  CINV (chemotherapy-induced nausea and vomiting) 8/21/2014  Decreased hearing, right PATIENT STATES THIS IS DUE TO CHEMOTHERAPY  Diabetes ([address] Age 46 IDDM  Environmental allergies  Fibromyalgia  GERD (gastroesophageal reflux disease)   
 controlled with med  Hx of pulmonary embolus during pregnancy 1/18/2015  Hydronephrosis due to obstruction of ureter 3/18/2019  Hypertension  Ill-defined condition Sepsis 9-2015 -  SOURCE PORT  
 Ill-defined condition 2016  
 chemotherapy  Mass of chest wall 05/2018  Morbid obesity ([address]  Murmur, heart  Other and unspecified hyperlipidemia  Ovarian cancer ([address] 9/2012, 1/2014 serous, high grade, stage IIIc. s/p chemotx (has port)  Psychiatric disorder Depression/Anxiety  Rectal bleeding  Thromboembolus ([address]  POST PARTUM; resolved- PELVIS  Thyroid disease HYPOTHYROID  Unspecified sleep apnea CPAP, Dr. Terry Roque PSH: 
Past Surgical History:  
Procedure Laterality Date  BREAST SURGERY PROCEDURE UNLISTED Lymph node in left breast 2017 [facility]  
  X2  
 HX HYSTERECTOMY  2012 ROULA/BSO, tumor debulking, omentectomy for ovarian cancer  HX ORTHOPAEDIC Right   
 ankle-FX, R  
 HX ORTHOPAEDIC Right   
 FX R ARM  
 HX UROLOGICAL Right  STENT FOR KIDNEY STONES  
 HX VASCULAR ACCESS  2015  
 right chest- port placed  HX VASCULAR ACCESS Left 2017 TETE CATH  
 
SOC: 
Social History Socioeconomic History  Marital status:  Spouse name: Not on file  Number of children: Not on file  Years of education: Not on file  Highest education level: Not on file Occupational History  Not on file Social Needs  Financial resource strain: Not on file  Food insecurity Worry: Not on file Inability: Not on file  Transportation needs Medical: Not on file Non-medical: Not on file Tobacco Use  Smoking status: Never Smoker  Smokeless tobacco: Never Used Substance and Sexual Activity  Alcohol use: Yes Comment: 1 drink per month  Drug use: No  
 Sexual activity: Yes Lifestyle  Physical activity Days per week: Not on file Minutes per session: Not on file  Stress: Not on file Relationships  Social connections Talks on phone: Not on file Gets together: Not on file Attends Hindu service: Not on file Active member of club or organization: Not on file Attends meetings of clubs or organizations: Not on file Relationship status: Not on file  Intimate partner violence Fear of current or ex partner: Not on file Emotionally abused: Not on file Physically abused: Not on file Forced sexual activity: Not on file Other Topics Concern  Not on file Social History Narrative Lives in [place] alone.  passed away in 5/16 of a heart attack. Has 2 sons. Disability. 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 Enjoys swimming and going to the gym. Family History Family History Problem Relation Age of Onset  Hypertension Mother Voncile Searing Arthritis-osteo Mother  Hypertension Father  Arthritis-osteo Father  Cancer Father PROSTATE  COPD Father  Hypertension Brother  Elevated Lipids Brother  Arthritis-osteo Brother  Hypertension Brother  Elevated Lipids Brother  Obesity Brother  Cancer Brother PROSTATE  Anesth Problems Son DELAYED AWAKENING  
 Diabetes Maternal Grandmother  Anesth Problems Paternal Grandmother PATIENT STATES GRANDMOTHER'S HEART STOPPED DURING SURGERY Medications: (reviewed) Current Outpatient Medications on File Prior to Encounter Medication Sig Dispense Refill  doxazosin (CARDURA) 1 mg tablet Take 1 mg by mouth daily.  SULFAMETHOXAZOLE-TRIMETHOPRIM PO Take  by mouth two (2) times a day. 7 days  polyethylene glycol (MIRALAX) 17 gram packet Take 1 Packet by mouth daily for 60 days. To prevent constipation with pain medication or any cause 60 Packet 0  
 dexAMETHasone (DECADRON) 4 mg tablet Take 2 tablets with breakfast the day before chemo and for 2 days after chemo 36 Tab 2  
 lidocaine-prilocaine (EMLA) topical cream Apply small amount over port area and cover with band aid one hour before chemo 30 g 3  
 ondansetron (ZOFRAN ODT) 4 mg disintegrating tablet Take 1 Tab by mouth every eight (8) hours as needed for Nausea.  Indications: nausea and vomiting caused by cancer drugs 30 Tab 2  
  predniSONE (DELTASONE) 50 mg tablet Take 1 tablet 13 hours prior to scan, take 1 tablet 7 hours prior to scan and take 1 tablet one hour prior to scan 3 Tab 0  Zejula 100 mg cap Take 100 mg by mouth daily. 60 Cap 4  
 fluticasone propionate (FLONASE) 50 mcg/actuation nasal spray 2 SPRAYS IN BOTH NOSTRILS DAILY 1 Bottle 0  
 omega-3 acid ethyl esters (LOVAZA) 1 gram capsule TAKE 1 CAPSULE BY MOUTH TWICE A  Cap 2  cycloSPORINE (RESTASIS) 0.05 % dpet  albuterol (PROVENTIL VENTOLIN) 2.5 mg /3 mL (0.083 %) nebu Take 2.5 mg by inhalation.  dextroamphetamine-amphetamine (ADDERALL) 20 mg tablet TAKE 1 TABLET BY MOUTH EVERY DAY  0  
 rosuvastatin (CRESTOR) 10 mg tablet TAKE 1 TABLET BY MOUTH EVERY DAY  0  
 telmisartan (MICARDIS) 20 mg tablet TAKE 1 TABLET BY MOUTH EVERY DAY 90 Tab 3  
 atorvastatin (LIPITOR) 20 mg tablet  BD ULTRA-FINE SHORT PEN NEEDLE 31 gauge x 5/16\" ndle USE TO INJECT INSULIN 5 TIMES DAILY 200 Pen Needle 11  
 insulin aspart U-100 (NOVOLOG FLEXPEN U-100 INSULIN) 100 unit/mL (3 mL) inpn INJECT 20 UNITS BEFORE EACH MEAL + 4 UNITS FOR EVERY 50 MG/DL ABOVE 150 MG/DL.  UNITS PER DAY 90 Adjustable Dose Pre-filled Pen Syringe 3  
 LEVEMIR FLEXTOUCH U-100 INSULN 100 unit/mL (3 mL) inpn INJECT 30 UNITS IN AM AND 50 UNITS AT NIGHT. INCREASE AS DIRECTED TO  UNITS/DAY. 90 Adjustable Dose Pre-filled Pen Syringe 3  DEXILANT 60 mg CpDB capsule (delayed release) TAKE 1 CAPSULE BY MOUTH EVERY DAY 90 Cap 3  
 LINZESS 145 mcg cap capsule TAKE 1 CAPSULE BY MOUTH EVERY DAYY 30 Cap 0  
 levothyroxine (SYNTHROID) 150 mcg tablet TAKE 1 TABLET BY MOUTH EVERY DAY BEFORE BREAKFAST 90 Tab 0  
 EPINEPHrine (EPIPEN) 0.3 mg/0.3 mL injection INJECT 0.3 ML BY INTRAMUSCULAR ROUTE ONCE AS NEEDED FOR UP TO 1 DOSE.  1  
 glucose blood VI test strips (TRUE METRIX GLUCOSE TEST STRIP) strip by Does Not Apply route See Admin Instructions. 30 Strip 5  lancets misc by Does Not Apply route daily. True Metrix lancets- test blood sugar once per day 30 Each 5  Blood-Glucose Meter monitoring kit Check up to tid, as directed 1 Kit 0  
 azelastine (ASTELIN) 137 mcg (0.1 %) nasal spray 1 [place] by Both Nostrils route daily as needed. Use in each nostril as directed  insulin detemir U-100 (LEVEMIR) 100 unit/mL injection 20 Units by SubCUTAneous route daily (with lunch).  montelukast (SINGULAIR) 10 mg tablet Take 10 mg by mouth nightly.  ascorbic acid, vitamin C, (VITAMIN C) 1,000 mg tablet Take 1,000 mg by mouth daily.  albuterol (PROVENTIL VENTOLIN) 2.5 mg /3 mL (0.083 %) nebulizer solution 2.5 mg by Nebulization route every four (4) hours as needed for Wheezing.  Liraglutide (VICTOZA) 0.6 mg/0.1 mL (18 mg/3 mL) pnij 1.8 mg by SubCUTAneous route daily (with lunch). 3 Pen 3  
 SYMBICORT 160-4.5 mcg/actuation HFAA Take 2 Puffs by inhalation two (2) times daily as needed. 2 Inhaler 3  clonazePAM (KLONOPIN) 0.5 mg tablet Take 0.5 mg by mouth nightly as needed.  sertraline (ZOLOFT) 50 mg tablet TAKE 1 TABLET BY MOUTH as needed  1  cholecalciferol, VITAMIN D3, (VITAMIN D3) 5,000 unit tab tablet Take 5,000 Units by mouth daily.  furosemide (LASIX) 40 mg tablet Take 40 mg by mouth every other day. 3 No current facility-administered medications on file prior to encounter. Allergies: (reviewed) Allergies Allergen Reactions  Carboplatin Other (comments) Patient developed shortness of breath, felt faint, coughing, skin flushed and \"itchy\". This occurred on the patients 8th treatment.  Iodinated Contrast Media Rash 13 hr pre-medication prior to IV contrast.  
Patient has done well with 1 hr pre-medication of (Solu-Medrol) 40mg &  (Benadryl) 50mg.  Lipitor [Atorvastatin] Myalgia  Shellfish Containing Products Hives  Tape [Adhesive] Itching and Other (comments) \"op site-- clear,thin tape\"--caused blister  Tomato Hives  Iodine And Iodide Containing Products Rash  Olmesartan Other (comments)  Losartan Other (comments)  
  headaches  Percocet [Oxycodone-Acetaminophen] Other (comments) Fever; however, current home med OBJECTIVE Physical Exam 
Visit Vitals /62 Pulse 61 Temp 98.2 °F (36.8 °C) Resp 18 Ht 5' 8\" (1.727 m) Wt 242 lb 12.8 oz (110.1 kg) LMP 09/07/2011 BMI 36.92 kg/m² GENERAL BRIAN: Conversant, alert, oriented. NAD HEENT: Oropharynx clear. Gingiva w/o edema/erythema. No JVD. No adenopathy RESPIRATORY: Lung CTA, No rales/rhonchi. Adequate respiratory effort. CARDIOVASC: Reg rate/rhythm, stable murmur low grade. No visible, grossly palpable chest lesion. GASTROINT: soft, non-tender, without masses or organomegaly, no fluid wave. Habitus limits exam.  
MUSCULOSKEL: No focal tenderness chest  
EXTREMITIES: bilat 1+ edema/girth nonpitting ALCON SURVEY: Negative NEURO: Grossly intact, no acute deficit. Oriented. Not depressed. Not agitated. Wt Readings from Last 3 Encounters:  
08/13/20 242 lb 12.8 oz (110.1 kg) 08/06/20 243 lb (110.2 kg) 02/19/20 274 lb 3.2 oz (124.4 kg) DATE REVIEW as available: 
Lab Results Component Value Date/Time WBC 3.7 08/13/2020 08:07 AM  
 Hemoglobin (POC) 10.5 (L) 03/20/2017 12:15 PM  
 HGB 7.1 (L) 08/13/2020 08:07 AM  
 Hematocrit (POC) 31 (L) 03/20/2017 12:15 PM  
 HCT 24.1 (L) 08/13/2020 08:07 AM  
 PLATELET 007 84/36/1096 08:07 AM  
 MCV 97.6 08/13/2020 08:07 AM  
 
Lab Results Component Value Date/Time  
 ABS. NEUTROPHILS 2.3 08/13/2020 08:07 AM  
 
Lab Results Component Value Date/Time  Sodium 138 08/13/2020 08:07 AM  
 Potassium 5.3 (H) 08/13/2020 08:07 AM  
 Chloride 112 (H) 08/13/2020 08:07 AM  
 CO2 19 (L) 08/13/2020 08:07 AM  
 Anion gap 7 08/13/2020 08:07 AM  
 Glucose 152 (H) 08/13/2020 08:07 AM  
 BUN 50 (H) 08/13/2020 08:07 AM  
 Creatinine 1.98 (H) 08/13/2020 08:07 AM  
 BUN/Creatinine ratio 25 (H) 08/13/2020 08:07 AM  
 GFR est AA 31 (L) 08/13/2020 08:07 AM  
 GFR est non-AA 26 (L) 08/13/2020 08:07 AM  
 Calcium 9.2 08/13/2020 08:07 AM  
 Bilirubin, total 0.4 08/06/2020 08:12 AM  
 Alk. phosphatase 215 (H) 08/06/2020 08:12 AM  
 Protein, total 8.9 (H) 08/06/2020 08:12 AM  
 Albumin 3.4 (L) 08/06/2020 08:12 AM  
 Globulin 5.5 (H) 08/06/2020 08:12 AM  
 A-G Ratio 0.6 (L) 08/06/2020 08:12 AM  
 ALT (SGPT) 66 08/06/2020 08:12 AM  
 
CA-125 Date Value Ref Range Status 06/11/2020 268 (H) 1.5 - 35.0 U/mL Final  
  Comment:  
  ** Note new reference range and method ** Results may vary depending on . Method used is [facility] 
  
05/14/2020 233 (H) 1.5 - 35.0 U/mL Final  
  Comment:  
  ** Note new reference range and method ** Results may vary depending on . Method used is [facility] 
  
03/19/2020 226 (H) 1.5 - 35.0 U/mL Final  
  Comment:  
  ** Note new reference range and method ** Results may vary depending on . Method used is [facility] 
  
02/20/2020 220 (H) 1.5 - 35.0 U/mL Final  
  Comment:  
  ** Note new reference range and method ** Results may vary depending on . Method used is [facility] 
  
01/23/2020 200 (H) 1.5 - 35.0 U/mL Final  
  Comment:  
  ** Note new reference range and method ** Results may vary depending on . Method used is [facility] ECHO 1/2020 · Normal cavity size and systolic function (ejection fraction normal). Mild concentric hypertrophy. Estimated left ventricular ejection fraction is 55 - 60%. Visually measured ejection fraction. No regional wall motion abnormality noted. · Mildly dilated left atrium. · Mild aortic valve sclerosis. · Trace mitral valve regurgitation is present.  
 
 
IMPRESSION AND PLAN: 
 62 y.o. with diffusely metastatic ovarian cancer. Continues on palliative chemo, retrial taxol C1 ongoing. Tolerated previous cycle. CKD stage 2-3 - slighly worse from baseline. Prior UTIs. stent exchange today. Anemia poss contributing. Transfuse 1uPRBCs add'l for SOB, gentle hydration today Right hydronephrosis - stent in place, change today. Liver mets -  Monitor liver studies. Chronic pain - Multiple sources chest and right abd primarily. Norco functional for now. May need to change at some point to dilaudid if worsens. Stool softeners Anemia - persistent d/t multiple causes with her diseases, chemo hx. Transfuse, consented. Consider EPO. SOB/debility - hx Asthma, controlled on meds. Encouraged to consider chair lift. Consider stimulants PRN 
IDDM2/SHABANA/depression - controlled overall. Continue CPAP 
CIPN/diabetic polyneuropathy - not in pain, monitor. Discussed home safety. Wt loss, prot smiley malnutrition - supplements discussed Psychosocial - she has some fear about the future. She is well supported, isaac remains very important. Monitor depressive sx mgmt. 25 minutes spent with the patient and >50% allotted to direct counseling, discussion and answering questions related to the above. Xander Rice PA-C 
8/13/2020

## 2020-08-20 NOTE — PROGRESS NOTES
[facility], [address] [place] 
P [phone]  F [phone] Office Visit Patient ID: 
Name: Terrell Goyal MRN: [id number] : 1960/60 y.o. Visit date: 2020 LEONID Holcomb is a 62 y.o.  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 The patient's previous treatment include primary debulking and adjuvant Taxol/carbo with retreatment in  for recurrence. Subsequent lines of chemo as outlined below. She has most recently started single agent weekly Taxol on 18 following progression with chest wall involvement while on PARPi. Most recent EOD imaging in 2020 shows a variable response to her prior therapy though with mostly increased disease including liver, mediastium and abd with progressive right hydronephrosis. She is s/p stent placement.   
   
OncTx History: 
12: MARAH/BSO/Cytoreductive surgery on 12 noted carcinomatosis/omental caking. 10/2012-2013: 6 cycles Carbo/Taxol 2014-2014: 6 Cycles carbo/Taxol 3/2015-5/13/15: 3 cycles Avastin/Doxil until progression 2015-2015 Weekly Topotecan  
2015-3/2016: Weekly Topotecan 2017-2017Tawnya Dimmer D1/D8 Q28 days for 6 cycles 2018: CT core needle bx chest wall mass: Metastatic high-grade serous carcinoma (see comment) Positive for malignancy. 2018-2018: Taxol weekly 2019: 10 fx 30cGy palliative RT to sternal lesion 2019 - 2020 [place]. Underwent right ureteral stent placement on 3/18/19. CT C/A/P 2020 with mixed response, but overall progression. Rising Ca-125 now 268. 
2020: CT CAP variable response to therapy with several lesions decreased in size. However a majority of the lesions have increased in size consistent with progression of disease with involvement of the mediastinum, liver masses, and peritoneal carcinomatosis.  Increase right-sided hydronephrosis. [place] 8/2020 - initiated Taxol weekly CT Results (most recent): 
[facility]20 CT ABD PELV W CONT Narrative EXAM: CT CHEST W CONT, CT ABD PELV W CONT INDICATION: Ovarian cancer COMPARISON: 10/2/2019 CONTRAST: 100 mL of Isovue-370. TECHNIQUE:  
Following the uneventful intravenous administration of contrast, thin axial 
images were obtained through the chest, abdomen and pelvis. Coronal and sagittal 
reconstructions were generated. Oral contrast was administered. CT dose 
reduction was achieved through use of a standardized protocol tailored for this 
examination and automatic exposure control for dose modulation. FINDINGS:  
 
THYROID: No nodule. MEDIASTINUM: Left paratracheal adenopathy now measures 3.7 x 2.0 cm increased in 
size. Mass in AP window which extends superiorly into the left infraclavicular 
region and inferiorly into the left hilum is also increase in size. When 
measured in a similar fashion this measures 3.5 x 2.4 previously 3.0 x 2.9 cm. Right parasternal mass is decreased in size measuring 3.4 x 3.4 cm. Subcarinal 
adenopathy measures 2.8 x 2.3 cm increased in size. There is increased size of 
the nodular component adjacent to the xiphoid now measuring 2.6 x 3.3 cm. SWATI: Left hilar lymph node measures 3.2 x 2.4 cm previously 2.5 x 1.8 cm. THORACIC AORTA: No dissection or aneurysm. MAIN PULMONARY ARTERY: Normal in caliber. TRACHEA/BRONCHI: Patent. ESOPHAGUS: No wall thickening or dilatation. HEART: Normal in size. PLEURA: No effusion or pneumothorax. LUNGS: No nodule, mass, or airspace disease. LIVER: Multiple masses are noted and better visualized on current study 
secondary to the addition of intravenous contrast. Comparison lesions measure 
previously, there is a 3.5 x 2.8 cm mass in the right hepatic lobe series 3 
image 58 slightly increased in size. There is a 3.6 x 2.9 cm mass in the left hepatic lobe slightly decreased in size series 3 image 56. Left hepatic lobe 
mass series 3 image 45 measuring 4.0 x 2.8 cm slightly increased in size. There 
is extensive tumor infiltration in the liver hilum with narrowing of the main 
portal vein as well as the intrahepatic portal veins. The common hepatic artery 
is not well visualized and likely occluded with some collateral flow noted in 
the liver hilum. GALLBLADDER: Not visualized SPLEEN: Mass in the splenic hilum increased in size measuring 7.4 x 4.3 cm with 
invasion into the splenic hilum. PANCREAS: The pancreas is encased by tumor. ADRENALS: There is likely involvement of the left adrenal gland tumor. KIDNEYS: Right nephroureteral stent with mild to moderate right-sided 
hydronephrosis. STOMACH: Tumor encasing the gastric antrum. SMALL BOWEL: No dilatation or wall thickening. COLON: Tumor abutting the tumor encasing the rectosigmoid junction. Tumor 
abutting the transverse colon. APPENDIX: Unremarkable. PERITONEUM: Some free fluid noted in the pelvis. Increased size of mesenteric 
masses. Representative examples: 7.8 x 4.3 cm tumor anterior peritoneum series 3 
image 84. No pelvic tumor 7.5 x 4.8 cm series 3 image 100. RETROPERITONEUM:  
Right pelvic sidewall increased in size. Large right lateral lymph node 
measuring 3.1 x 2.0 cm not significantly changed. REPRODUCTIVE ORGANS: The visualized URINARY BLADDER: No mass or calculus. BONES: No destructive bone lesion. ADDITIONAL COMMENTS: N/A Impression IMPRESSION: 
1. There is some variable response to therapy with several lesions decreased in 
size. However a majority of the lesions have increased in size consistent with 
progression of disease with involvement of the mediastinum, liver masses, and 
peritoneal carcinomatosis. 2.  Increase right-sided hydronephrosis. [facility] CT 10/2019: 
FINDINGS: 
THYROID: Visualized gland is unremarkable. MEDIASTINUM: Upper left paratracheal node measures 2.2 x 3.4 cm (2, 8), 
previously 2.3 x 3.5 cm. Superior prevascular node measures 2.9 x 3.0 cm, 
previously 3.5 x 3.8 cm. Poorly delineated subcarinal node measures about 3.4 x 
4.8 cm, previously 3.6 x 4.8 cm. Precardiac nodes measure 1.9 x 2.5 cm and 1.7 x 
2.4 cm (2, 39), previously 2.1 x 2.7 cm and 2.0 x 2.0 cm. SWATI: No mass or lymphadenopathy. THORACIC AORTA: No dissection or aneurysm. MAIN PULMONARY ARTERY: Normal in caliber. TRACHEA/BRONCHI: Patent. ESOPHAGUS: No wall thickening or dilatation. HEART: Normal in size. PLEURA: No effusion or pneumothorax. LUNGS: No nodule, mass, or airspace disease. LIVER: Multiple hypodense lesions suspicious for metastases, measuring up to 2.5 
x 4.0 cm in segment 2 (2, 43) series, previously 2.8 x 3.7 cm, 3.2 x 4.6 cm in 
segment 3 (2, 55), previously 3.1 x 4.5 cm, and partially exophytic from the 
posterior inferior right lobe segment 6 measuring 2.5 x 3.5 cm, previously 2.8 x 
3.4 cm. GALLBLADDER: Unremarkable. SPLEEN: Central 1.9 x 3.1 cm hypodensity (2, 47), previously 1.9 x 2.3 cm. Otherwise unremarkable. PANCREAS: Inseparable from bulky central abdominal adenopathy with no identified 
ductal dilation. ADRENALS: Unremarkable. KIDNEYS: Right hydronephrosis has improved status post right nephroureteral 
stent placement. Kidneys otherwise appear unremarkable. STOMACH: Unremarkable. SMALL BOWEL: No dilatation or wall thickening. COLON: No dilation or wall thickening. APPENDIX: Unremarkable. PERITONEUM: Extensive peritoneal implants with representative largest lesions 
measuring 4.4 x 6.1 cm and the left adnexal region (2, 98), previously 4.3 x 5.7 
cm, 3.8 x 5.9 cm in the anterior midline (2, 81), previously 3.4 x 5.4 cm, and 
2.8 x 4.7 cm in the right abdomen (2, 78), previously 3.2 x 4.7 cm. RETROPERITONEUM: Extensive rocio hepatis, retroperitoneal, and pelvic sidewall adenopathy. Conglomerate nkechi mass in the rocio hepatis region measures 5.7 x 
11.1 cm (2, 58) is, previously 6.3 x 12.2 cm. Aortocaval node measures 3.7 x 5.3 
cm (2, 77), previously 4.5 x 5.4 cm. Right pelvic sidewall node measures 2.9 x 
4.2 cm (2, 103), previously 3.4 x 4.2 cm. Left external iliac node measures 3.0 
x 4.8 cm (2, 101), previously 3.3 x 4.8 cm. REPRODUCTIVE ORGANS: Uterus and ovaries are surgically absent. URINARY BLADDER: No mass or calculus. Distal end of right nephroureteral stent 
positioned within bladder lumen. BONES: Degenerative spine change. No acute fracture or aggressive lesion. ADDITIONAL COMMENTS: Mass centered at the right sternocostal junction measures 5.3 x 5.4 cm (2, 21), previously 4.8 x 5.7 cm. Post surgical changes in the 
anterior abdominal wall. Left Port-A-Cath in place. Bilateral inguinal 
adenopathy measuring 2.1 x 3.2 cm on the right (2, 115), previously 2.3 x 3.7 cm 
and 2.7 x 2.8 cm on the left (2, 108), previously 2.9 x 3.1 cm. . 
  
IMPRESSION: Overall response to disease with either decrease in size or no 
significant change in extensive nkechi metastases, hepatic metastases, right 
sternal metastasis, and peritoneal carcinomatosis as above. No new metastasis or 
progressive metastasis identified. Status post right nephroureteral stent with 
interval resolution of right hydronephrosis. SUBJECTIVE: 
Presents to [facility]. Doing better this weak. Less fatigue/SOB s/p transfusion. Down to one Norco/day using at bedtime. No N/V, appetite improved. Drinks 4bottles water/day with gatorade. +BM daily to every other day with miralax. Discomforts mostly right flank related to her ureteral stent that has caused multiple issues for her. Seen by her urologist, plans for stent change in Oct.  
She remains mostly independent, OOB most of day.  She reports completes chores and all self care, though gets winded at times, most severely going up to her second-floor room by 14stairs. Stair lift is too expensive, thinking of adding on to home. Two adult sons at home who cook for her. She is also fostering to boys 13 and 12. The older is moving out in October to reunite with his mother. She is planning to adopt the younger. Confirms if she passes, her son Satish will adopt him. She has discussed this with the adoption agency. She has chronic pedal/hand numbness/tingling due to taxol and DM. Denies pain. Wakes from sleep occasionally d/t this. Resumes sleep easily. Follows with podiatrist. Denies any change in depression, continues medication. ROS 10 pt negative except as per above PMH: 
Past Medical History:  
Diagnosis Date  Adverse effect of anesthesia   
 sleep apnea cpap machine useage  Anemia  Arthritis DDD low back and knees  Asthma   
 uses albuterol prn only; none x 6 mos. Never hospitalized  BRCA negative 1/2013  Calculus of kidney  Chronic kidney disease   
 kidney stones  Chronic pain   
 knee pain bilat  CINV (chemotherapy-induced nausea and vomiting) 8/21/2014  Decreased hearing, right PATIENT STATES THIS IS DUE TO CHEMOTHERAPY  Diabetes ([address] Age 46 IDDM  Environmental allergies  Fibromyalgia  GERD (gastroesophageal reflux disease)   
 controlled with med  Hx of pulmonary embolus during pregnancy 1/18/2015  Hydronephrosis due to obstruction of ureter 3/18/2019  Hypertension  Ill-defined condition Sepsis 9-2015 -  SOURCE PORT  
 Ill-defined condition 2016  
 chemotherapy  Mass of chest wall 05/2018  Morbid obesity ([address]  Murmur, heart  Other and unspecified hyperlipidemia  Ovarian cancer ([address] 9/2012, 1/2014  
 serous, high grade, stage IIIc. s/p chemotx (has port)  Psychiatric disorder Depression/Anxiety  Rectal bleeding  Thromboembolus ([address] 1993 POST PARTUM; resolved- PELVIS  Thyroid disease  HYPOTHYROID  
  Unspecified sleep apnea CPAP, Dr. Bronson Patel PSH: 
Past Surgical History:  
Procedure Laterality Date  BREAST SURGERY PROCEDURE UNLISTED Lymph node in left breast 2017 Walter  
  X2  
 HX HYSTERECTOMY  2012 ROULA/BSO, tumor debulking, omentectomy for ovarian cancer  HX ORTHOPAEDIC Right   
 ankle-FX, R  
 HX ORTHOPAEDIC Right   
 FX R ARM  
 HX UROLOGICAL Right 2017 STENT FOR KIDNEY STONES  
 HX VASCULAR ACCESS  2015  
 right chest- port placed  HX VASCULAR ACCESS Left 2017 TETE CATH  
 
SOC: 
Social History Socioeconomic History  Marital status:  Spouse name: Not on file  Number of children: Not on file  Years of education: Not on file  Highest education level: Not on file Occupational History  Not on file Social Needs  Financial resource strain: Not on file  Food insecurity Worry: Not on file Inability: Not on file  Transportation needs Medical: Not on file Non-medical: Not on file Tobacco Use  Smoking status: Never Smoker  Smokeless tobacco: Never Used Substance and Sexual Activity  Alcohol use: Yes Comment: 1 drink per month  Drug use: No  
 Sexual activity: Yes Lifestyle  Physical activity Days per week: Not on file Minutes per session: Not on file  Stress: Not on file Relationships  Social connections Talks on phone: Not on file Gets together: Not on file Attends Scientologist service: Not on file Active member of club or organization: Not on file Attends meetings of clubs or organizations: Not on file Relationship status: Not on file  Intimate partner violence Fear of current or ex partner: Not on file Emotionally abused: Not on file Physically abused: Not on file Forced sexual activity: Not on file Other Topics Concern  Not on file Social History Narrative Lives in [facility] alone.  passed away in 5/16 of a heart attack. Has 2 sons. Disability. 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 Enjoys swimming and going to the gym. Family History Family History Problem Relation Age of Onset  Hypertension Mother [place] Arthritis-osteo Mother  Hypertension Father  Arthritis-osteo Father  Cancer Father PROSTATE  COPD Father  Hypertension Brother  Elevated Lipids Brother  Arthritis-osteo Brother  Hypertension Brother  Elevated Lipids Brother  Obesity Brother  Cancer Brother PROSTATE  Anesth Problems Son DELAYED AWAKENING  
 Diabetes Maternal Grandmother  Anesth Problems Paternal Grandmother PATIENT STATES GRANDMOTHER'S HEART STOPPED DURING SURGERY Medications: (reviewed) Current Outpatient Medications on File Prior to Encounter Medication Sig Dispense Refill  doxazosin (CARDURA) 1 mg tablet Take 1 mg by mouth daily.  SULFAMETHOXAZOLE-TRIMETHOPRIM PO Take  by mouth two (2) times a day. 7 days  polyethylene glycol (MIRALAX) 17 gram packet Take 1 Packet by mouth daily for 60 days. To prevent constipation with pain medication or any cause 60 Packet 0  
 dexAMETHasone (DECADRON) 4 mg tablet Take 2 tablets with breakfast the day before chemo and for 2 days after chemo 36 Tab 2  
 lidocaine-prilocaine (EMLA) topical cream Apply small amount over port area and cover with band aid one hour before chemo 30 g 3  
 ondansetron (ZOFRAN ODT) 4 mg disintegrating tablet Take 1 Tab by mouth every eight (8) hours as needed for Nausea. Indications: nausea and vomiting caused by cancer drugs 30 Tab 2  predniSONE (DELTASONE) 50 mg tablet Take 1 tablet 13 hours prior to scan, take 1 tablet 7 hours prior to scan and take 1 tablet one hour prior to scan 3 Tab 0  Zejula 100 mg cap Take 100 mg by mouth daily.  60 Cap 4  
  fluticasone propionate (FLONASE) 50 mcg/actuation nasal spray 2 SPRAYS IN BOTH NOSTRILS DAILY 1 Bottle 0  
 omega-3 acid ethyl esters (LOVAZA) 1 gram capsule TAKE 1 CAPSULE BY MOUTH TWICE A  Cap 2  cycloSPORINE (RESTASIS) 0.05 % dpet  albuterol (PROVENTIL VENTOLIN) 2.5 mg /3 mL (0.083 %) nebu Take 2.5 mg by inhalation.  dextroamphetamine-amphetamine (ADDERALL) 20 mg tablet TAKE 1 TABLET BY MOUTH EVERY DAY  0  
 rosuvastatin (CRESTOR) 10 mg tablet TAKE 1 TABLET BY MOUTH EVERY DAY  0  
 telmisartan (MICARDIS) 20 mg tablet TAKE 1 TABLET BY MOUTH EVERY DAY 90 Tab 3  
 atorvastatin (LIPITOR) 20 mg tablet  BD ULTRA-FINE SHORT PEN NEEDLE 31 gauge x 5/16\" ndle USE TO INJECT INSULIN 5 TIMES DAILY 200 Pen Needle 11  
 insulin aspart U-100 (NOVOLOG FLEXPEN U-100 INSULIN) 100 unit/mL (3 mL) inpn INJECT 20 UNITS BEFORE EACH MEAL + 4 UNITS FOR EVERY 50 MG/DL ABOVE 150 MG/DL.  UNITS PER DAY 90 Adjustable Dose Pre-filled Pen Syringe 3  
 LEVEMIR FLEXTOUCH U-100 INSULN 100 unit/mL (3 mL) inpn INJECT 30 UNITS IN AM AND 50 UNITS AT NIGHT. INCREASE AS DIRECTED TO  UNITS/DAY. 90 Adjustable Dose Pre-filled Pen Syringe 3  DEXILANT 60 mg CpDB capsule (delayed release) TAKE 1 CAPSULE BY MOUTH EVERY DAY 90 Cap 3  
 LINZESS 145 mcg cap capsule TAKE 1 CAPSULE BY MOUTH EVERY DAYY 30 Cap 0  
 levothyroxine (SYNTHROID) 150 mcg tablet TAKE 1 TABLET BY MOUTH EVERY DAY BEFORE BREAKFAST 90 Tab 0  
 EPINEPHrine (EPIPEN) 0.3 mg/0.3 mL injection INJECT 0.3 ML BY INTRAMUSCULAR ROUTE ONCE AS NEEDED FOR UP TO 1 DOSE.  1  
 glucose blood VI test strips (TRUE METRIX GLUCOSE TEST STRIP) strip by Does Not Apply route See Admin Instructions. 30 Strip 5  
 lancets misc by Does Not Apply route daily. True Metrix lancets- test blood sugar once per day 30 Each 5  Blood-Glucose Meter monitoring kit Check up to tid, as directed 1 Kit 0  
 azelastine (ASTELIN) 137 mcg (0.1 %) nasal spray 1 Spray by Both Nostrils route daily as needed. Use in each nostril as directed  insulin detemir U-100 (LEVEMIR) 100 unit/mL injection 20 Units by SubCUTAneous route daily (with lunch).  montelukast (SINGULAIR) 10 mg tablet Take 10 mg by mouth nightly.  ascorbic acid, vitamin C, (VITAMIN C) 1,000 mg tablet Take 1,000 mg by mouth daily.  albuterol (PROVENTIL VENTOLIN) 2.5 mg /3 mL (0.083 %) nebulizer solution 2.5 mg by Nebulization route every four (4) hours as needed for Wheezing.  Liraglutide (VICTOZA) 0.6 mg/0.1 mL (18 mg/3 mL) pnij 1.8 mg by SubCUTAneous route daily (with lunch). 3 Pen 3  
 SYMBICORT 160-4.5 mcg/actuation HFAA Take 2 Puffs by inhalation two (2) times daily as needed. 2 Inhaler 3  clonazePAM (KLONOPIN) 0.5 mg tablet Take 0.5 mg by mouth nightly as needed.  sertraline (ZOLOFT) 50 mg tablet TAKE 1 TABLET BY MOUTH as needed  1  cholecalciferol, VITAMIN D3, (VITAMIN D3) 5,000 unit tab tablet Take 5,000 Units by mouth daily.  furosemide (LASIX) 40 mg tablet Take 40 mg by mouth every other day. 3 No current facility-administered medications on file prior to encounter. Allergies: (reviewed) Allergies Allergen Reactions  Carboplatin Other (comments) Patient developed shortness of breath, felt faint, coughing, skin flushed and \"itchy\". This occurred on the patients 8th treatment.  Iodinated Contrast Media Rash 13 hr pre-medication prior to IV contrast.  
Patient has done well with 1 hr pre-medication of (Solu-Medrol) 40mg &  (Benadryl) 50mg.  Lipitor [Atorvastatin] Myalgia  Shellfish Containing Products Hives  Tape [Adhesive] Itching and Other (comments) \"op site-- clear,thin tape\"--caused blister  Tomato Hives  Iodine And Iodide Containing Products Rash  Olmesartan Other (comments)  Losartan Other (comments)  
  headaches  Percocet [Oxycodone-Acetaminophen] Other (comments) Fever; however, current home med OBJECTIVE Physical Exam 
Visit Vitals /78 (BP 1 Location: Left arm, BP Patient Position: At rest) Pulse 91 Temp 97 °F (36.1 °C) Resp 18 Ht 5' 8\" (1.727 m) Wt 244 lb 6.4 oz (110.9 kg) LMP 09/07/2011 BMI 37.16 kg/m² GENERAL BRIAN: Conversant, pleasant, alert, oriented. NAD HEENT: Oropharynx clear. Sclera anicteric. No JVD. No adenopathy RESPIRATORY: Lung CTA, No rales/rhonchi. Adequate respiratory effort. CARDIOVASC: Reg rate/rhythm, stable murmur low grade. No visible chest lesion. GASTROINT: soft, non-tender, without masses or organomegaly, no fluid wave. Habitus limits exam.  
EXTREMITIES: bilat 1+ edema/girth nonpitting ALCON SURVEY: Negative NEURO: Grossly intact, no acute deficit. Oriented. Not depressed. Not agitated. Wt Readings from Last 3 Encounters:  
08/20/20 244 lb 6.4 oz (110.9 kg) 08/13/20 242 lb 12.8 oz (110.1 kg) 08/06/20 243 lb (110.2 kg) DATE REVIEW as available: 
Lab Results Component Value Date/Time WBC 3.2 (L) 08/20/2020 08:18 AM  
 Hemoglobin (POC) 10.5 (L) 03/20/2017 12:15 PM  
 HGB 7.4 (L) 08/20/2020 08:18 AM  
 Hematocrit (POC) 31 (L) 03/20/2017 12:15 PM  
 HCT 24.2 (L) 08/20/2020 08:18 AM  
 PLATELET 079 92/12/9850 08:18 AM  
 MCV 97.6 08/20/2020 08:18 AM  
 
Lab Results Component Value Date/Time  
 ABS. NEUTROPHILS 2.0 08/20/2020 08:18 AM  
 
Lab Results Component Value Date/Time  Sodium 141 08/20/2020 08:18 AM  
 Potassium 4.7 08/20/2020 08:18 AM  
 Chloride 115 (H) 08/20/2020 08:18 AM  
 CO2 20 (L) 08/20/2020 08:18 AM  
 Anion gap 6 08/20/2020 08:18 AM  
 Glucose 155 (H) 08/20/2020 08:18 AM  
 BUN 44 (H) 08/20/2020 08:18 AM  
 Creatinine 1.63 (H) 08/20/2020 08:18 AM  
 BUN/Creatinine ratio 27 (H) 08/20/2020 08:18 AM  
 GFR est AA 39 (L) 08/20/2020 08:18 AM  
 GFR est non-AA 32 (L) 08/20/2020 08:18 AM  
 Calcium 9.1 08/20/2020 08:18 AM  
 Bilirubin, total 0.4 08/06/2020 08:12 AM  
 Alk. phosphatase 215 (H) 08/06/2020 08:12 AM  
 Protein, total 8.9 (H) 08/06/2020 08:12 AM  
 Albumin 3.4 (L) 08/06/2020 08:12 AM  
 Globulin 5.5 (H) 08/06/2020 08:12 AM  
 A-G Ratio 0.6 (L) 08/06/2020 08:12 AM  
 ALT (SGPT) 66 08/06/2020 08:12 AM  
 
CA-125 Date Value Ref Range Status 06/11/2020 268 (H) 1.5 - 35.0 U/mL Final  
  Comment:  
  ** Note new reference range and method ** Results may vary depending on . Method used is [employer] 
  
05/14/2020 233 (H) 1.5 - 35.0 U/mL Final  
  Comment:  
  ** Note new reference range and method ** Results may vary depending on . Method used is [employer] 
  
03/19/2020 226 (H) 1.5 - 35.0 U/mL Final  
  Comment:  
  ** Note new reference range and method ** Results may vary depending on . Method used is [employer] 
  
02/20/2020 220 (H) 1.5 - 35.0 U/mL Final  
  Comment:  
  ** Note new reference range and method ** Results may vary depending on . Method used is [employer] 
  
01/23/2020 200 (H) 1.5 - 35.0 U/mL Final  
  Comment:  
  ** Note new reference range and method ** Results may vary depending on . Method used is [employer] ECHO 1/2020 · Normal cavity size and systolic function (ejection fraction normal). Mild concentric hypertrophy. Estimated left ventricular ejection fraction is 55 - 60%. Visually measured ejection fraction. No regional wall motion abnormality noted. · Mildly dilated left atrium. · Mild aortic valve sclerosis. · Trace mitral valve regurgitation is present. IMPRESSION AND PLAN: 
62 y.o. with diffusely metastatic ovarian cancer. Continues on palliative chemo, retrial taxol C1 ongoing. Tolerated previous cycle. CKD stage 2-3 - improved s/p transfusion. Continue home hydration Right hydronephrosis - stent in place. Exchange Oct 
Liver mets -  Monitor liver studies Chronic pain - d/t stent/disease. Norco functional for now. Stool softeners Anemia - persistent d/t multiple causes, chemo hx. Consider EPO 
SOB/debility - hx Asthma, controlled on meds. Consider stimulants PRN. Consider home bed d/t stairs. IDDM2/SHABANA/depression - controlled overall. Continue CPAP 
CIPN/diabetic polyneuropathy - not in pain, monitor. Discussed home safety. Wt loss, prot smiley malnutrition - supplements discussed Psychosocial - she has some fear about the future. She is well supported, isaac remains very important. Monitor depressive sx mgmt. Discussed home life and plans for the future. Plans for adopting her foster child with support of her son Satish. Nicole Aaron PA-C 
8/20/2020

## 2020-08-20 NOTE — PROGRESS NOTES
[facility] Chemo Progress Note Date: 2020 Name: Kisha Pedersen MRN: [id number] : 1960 
 
0805 Ms. Trent David Arrived to [facility] for C1D15 Taxol ambulatory in stable condition. Assessment was completed, no acute issues at this time, no new complaints voiced. Port accessed with positive blood return. Labs drawn and sent for processing. Line flushed and capped. Chemotherapy Flowsheet 2020 Cycle C1D15 Date 2020 Drug / Regimen Taxol Dosage -  
Pre Hydration -  
Pre Meds given Notes given Ms. Moore's vitals were reviewed. Patient Vitals for the past 12 hrs: 
 Temp Pulse Resp BP  
20 1228  80 18 161/86  
20 0808 97 °F (36.1 °C) 91 18 150/78 Lab results were obtained and reviewed. Line connected to NS at [facility]. Recent Results (from the past 12 hour(s)) CBC WITH AUTOMATED DIFF Collection Time: 20  8:18 AM  
Result Value Ref Range WBC 3.2 (L) 3.6 - 11.0 K/uL  
 RBC 2.48 (L) 3.80 - 5.20 M/uL HGB 7.4 (L) 11.5 - 16.0 g/dL HCT 24.2 (L) 35.0 - 47.0 % MCV 97.6 80.0 - 99.0 FL  
 MCH 29.8 26.0 - 34.0 PG  
 MCHC 30.6 30.0 - 36.5 g/dL  
 RDW 14.3 11.5 - 14.5 % PLATELET 288 918 - 450 K/uL MPV 9.9 8.9 - 12.9 FL  
 NRBC 0.0 0  WBC ABSOLUTE NRBC 0.00 0.00 - 0.01 K/uL NEUTROPHILS 61 32 - 75 % LYMPHOCYTES 23 12 - 49 % MONOCYTES 11 5 - 13 % EOSINOPHILS 3 0 - 7 % BASOPHILS 1 0 - 1 % IMMATURE GRANULOCYTES 1 (H) 0.0 - 0.5 % ABS. NEUTROPHILS 2.0 1.8 - 8.0 K/UL  
 ABS. LYMPHOCYTES 0.7 (L) 0.8 - 3.5 K/UL  
 ABS. MONOCYTES 0.4 0.0 - 1.0 K/UL  
 ABS. EOSINOPHILS 0.1 0.0 - 0.4 K/UL  
 ABS. BASOPHILS 0.0 0.0 - 0.1 K/UL  
 ABS. IMM. GRANS. 0.0 0.00 - 0.04 K/UL  
 DF SMEAR SCANNED    
 RBC COMMENTS NORMOCYTIC, NORMOCHROMIC METABOLIC PANEL, BASIC Collection Time: 20  8:18 AM  
Result Value Ref Range Sodium 141 136 - 145 mmol/L Potassium 4.7 3.5 - 5.1 mmol/L  Chloride 115 (H) 97 - 108 mmol/L  
 CO2 20 (L) 21 - 32 mmol/L Anion gap 6 5 - 15 mmol/L Glucose 155 (H) 65 - 100 mg/dL BUN 44 (H) 6 - 20 MG/DL Creatinine 1.63 (H) 0.55 - 1.02 MG/DL  
 BUN/Creatinine ratio 27 (H) 12 - 20 GFR est AA 39 (L) >60 ml/min/1.73m2 GFR est non-AA 32 (L) >60 ml/min/1.73m2 Calcium 9.1 8.5 - 10.1 MG/DL Pre-medications  were administered as ordered and chemotherapy was initiated. Medications Administered 0.9% sodium chloride infusion Admin Date 
08/20/2020 Action New Bag Dose 25 mL/hr Rate 25 mL/hr Route IntraVENous Administered By 
Brianna Hawkins RN  
  
  
 0.9% sodium chloride injection 10 mL Admin Date 
08/20/2020 Action Given Dose 
10 mL Route IntraVENous Administered By 
Brianna Hawkins RN  
  
  
 dexamethasone (DECADRON) 12 mg in 0.9% sodium chloride 50 mL, overfill volume 5 mL IVPB Admin Date 
08/20/2020 Action Given Dose 
12 mg Rate 232 mL/hr Route IntraVENous Administered By 
Brianna Hawkins RN  
  
  
 diphenhydrAMINE (BENADRYL) injection 50 mg   
 Admin Date 
08/20/2020 Action Given Dose 50 mg Route IntraVENous Administered By 
Brianna Hawkins RN  
  
  
 famotidine (PF) (PEPCID) 20 mg in 0.9% sodium chloride 10 mL injection Admin Date 
08/20/2020 Action Given Dose 
20 mg Route IntraVENous Administered By 
Brianna Hawkins RN  
  
  
 heparin (porcine) pf 300-500 Units Admin Date 
08/20/2020 Action Given Dose 
500 Units Route InterCATHeter Administered By 
Brianna Hawkins RN  
  
  
 PACLitaxeL (TAXOL) 197 mg in 0.9% sodium chloride 250 mL, overfill volume 25 mL chemo infusion Admin Date 
08/20/2020 Action New Bag Dose 
197 mg Rate 
307.8 mL/hr Route IntraVENous Administered By 
Brianna Hawkins RN  
  
  
 palonosetron HCl (ALOXI) injection 0.25 mg   
 Admin Date 
08/20/2020 Action Given Dose 0.25 mg Route IntraVENous Administered By 
Brianna Hawkins RN  
  
  
 saline peripheral flush soln 10 mL Admin Date 08/20/2020 Action Given Dose 
10 mL Route InterCATHeter Administered By 
Cm Anderson RN Admin Date 
08/20/2020 Action Given Dose 
10 mL Route InterCATHeter Administered By 
Cm Anderson RN  
  
  
  
 
[id number] Patient tolerated treatment well. Port maintained positive blood return throughout treatment. Port flushed, heparinized and de accessed per protocol. Patient was discharged from Future Appointments Date Time Provider Gabriel Phillip 8/25/2020  2:00 PM MD LUCIEN Tavarez Ace  
9/3/2020  8:00 AM A1 [facility] [address]  
9/10/2020  8:00 AM A1 [facility] [address]  
9/17/2020  8:00 AM A1 [facility] [address]  
9/22/2020  2:00 PM MD LUCIEN Tavarez Ace BS CHIKI Esquivel RN August 20, 2020

## 2020-08-25 NOTE — PROGRESS NOTES
Christina Oliveira is a 62 y.o. female who was seen by synchronous (real-time) audio-video technology on 2020 [address] [facility], [address] [place] [address] Thea Marin 
P [phone]  F [phone] Office Visit Patient ID: 
Name: Christina Oliveira MRN: [id number] : 1960/60 y.o. Visit date: 2020 LEONID Lua is a 62 y.o.  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 The patient's previous treatment procedures include primary Taxol/carbo with retreatment in  and Doxil/Avastin, topotecan, gemzar and now single agent weekly Taxol initiated 18 following progression with chest wall involvement. In 2018, patient did not follow up and self-discontinued her treatment regimen. She presented to the ER on 18 with SOB. Negative workup for PE or MI. Admitted to Hospitalist service from -18. Managed for acute asthma exacerbation, acute FELICIA, and hyperkalemia. The patient was initiated on Jenean Rice on 19. Underwent repeat CT C/A/P on 19 for purposes of following response to treatment. Patient was tolerating Jenean Rice well for the first month, but then had a rise in her creatinine. However, she was also found to have hydronephrosis. Underwent right ureteral stent placement on 3/18/19. Restarted Zejula afterwards, although with continued rise in creatinine to >2. Held Jenean Rice again and restarted Zejula at 100mg/daily on 19. Presents today for follow-up and consideration of continuation of Jenean Rice. Completed 2700 cGy of radiation to her chest wall mass on 19. Tolerating treatment well. Held for one week in 10/2019 for blood transfusion secondary to anemia. Normal iron and B12 labs. Her Ca-125 has bounced around, but has been steadily declining recently.  CT C/A/P on 10/2/19 with improvement in her disease. CT C/A/P 6/24/2020 demonstrates progression. Rising Ca-125. On 8/6/2020 initiated on weekly paclitaxel. The pain at her chest wall mass is much improved. Otherwise the patient is really without complaints. She is on UTI suppression with her Urologist as she has had multiple UTIs with her ureteral stent in place. Denies change in appetite or bowel habits. Denies vaginal bleeding, hematuria, hematochezia, constipation, diarrhea, nausea, vomiting, CP, SOB, fevers, or chills. Recent sinus infection. She will complete treatment this coming Monday for her sinus infection.  
   
OncTx History: 
9/21/12: MARAH/BSO/Cytoreductive surgery on 9/21/12 noted carcinomatosis/omental caking. 10/2012-2/2013: 6 cycles Carbo/Taxol 8/2014-11/2014: 6 Cycles carbo/Taxol 3/2015-5/13/15: 3 cycles Avastin/Doxil until progression 7/2015-8/2015 Weekly Topotecan  
12/2015-3/2016: Weekly Topotecan 2/2017-9/2017Franceen Isiah D1/D8 Q28 days for 6 cycles 4/2018: CT core needle bx chest wall mass: Metastatic high-grade serous carcinoma (see comment) General Categorization Positive for malignancy. 5/8/2018-Present: Taxol  N9,3,89; S/P  Cycle #4  8/14/2018. Patient discontinued treatment secondary to side effects and fatigue. 1/23/2019 - Initiated Berenice Joel. Held do to rising creatinine. However, she was also found to have hydronephrosis. Underwent right ureteral stent placement on 3/18/19. CT C/A/P 6/24/2020 with mixed response, but overall progression. Rising Ca-125 now 268. 
8/6/2020: Initiated weekly paclitaxel 80mg/m2 days , 8, and 15 of 28-day cycle. Imaging Review:  
CT C/A/P 6/24/2020: 
FINDINGS:  
THYROID: No nodule. MEDIASTINUM: Left paratracheal adenopathy now measures 3.7 x 2.0 cm increased in 
size. Mass in AP window which extends superiorly into the left infraclavicular 
region and inferiorly into the left hilum is also increase in size. When measured in a similar fashion this measures 3.5 x 2.4 previously 3.0 x 2.9 cm. Right parasternal mass is decreased in size measuring 3.4 x 3.4 cm. Subcarinal 
adenopathy measures 2.8 x 2.3 cm increased in size. There is increased size of 
the nodular component adjacent to the xiphoid now measuring 2.6 x 3.3 cm. SWATI: Left hilar lymph node measures 3.2 x 2.4 cm previously 2.5 x 1.8 cm. THORACIC AORTA: No dissection or aneurysm. MAIN PULMONARY ARTERY: Normal in caliber. TRACHEA/BRONCHI: Patent. ESOPHAGUS: No wall thickening or dilatation. HEART: Normal in size. PLEURA: No effusion or pneumothorax. LUNGS: No nodule, mass, or airspace disease. LIVER: Multiple masses are noted and better visualized on current study 
secondary to the addition of intravenous contrast. Comparison lesions measure 
previously, there is a 3.5 x 2.8 cm mass in the right hepatic lobe series 3 
image 58 slightly increased in size. There is a 3.6 x 2.9 cm mass in the left 
hepatic lobe slightly decreased in size series 3 image 56. Left hepatic lobe 
mass series 3 image 45 measuring 4.0 x 2.8 cm slightly increased in size. There 
is extensive tumor infiltration in the liver hilum with narrowing of the main 
portal vein as well as the intrahepatic portal veins. The common hepatic artery 
is not well visualized and likely occluded with some collateral flow noted in 
the liver hilum. GALLBLADDER: Not visualized SPLEEN: Mass in the splenic hilum increased in size measuring 7.4 x 4.3 cm with 
invasion into the splenic hilum. PANCREAS: The pancreas is encased by tumor. ADRENALS: There is likely involvement of the left adrenal gland tumor. KIDNEYS: Right nephroureteral stent with mild to moderate right-sided 
hydronephrosis. STOMACH: Tumor encasing the gastric antrum. SMALL BOWEL: No dilatation or wall thickening. COLON: Tumor abutting the tumor encasing the rectosigmoid junction. Tumor 
abutting the transverse colon. APPENDIX: Unremarkable. PERITONEUM: Some free fluid noted in the pelvis. Increased size of mesenteric 
masses. Representative examples: 7.8 x 4.3 cm tumor anterior peritoneum series 3 
image 84. No pelvic tumor 7.5 x 4.8 cm series 3 image 100. RETROPERITONEUM:  
Right pelvic sidewall increased in size. Large right lateral lymph node 
measuring 3.1 x 2.0 cm not significantly changed. REPRODUCTIVE ORGANS: The visualized URINARY BLADDER: No mass or calculus. BONES: No destructive bone lesion. ADDITIONAL COMMENTS: N/A IMPRESSION IMPRESSION: 
1. There is some variable response to therapy with several lesions decreased in 
size. However a majority of the lesions have increased in size consistent with 
progression of disease with involvement of the mediastinum, liver masses, and 
peritoneal carcinomatosis. 2.  Increase right-sided hydronephrosis. [facility] CT C/A/P 10/2/19:  
FINDINGS: 
THYROID: Visualized gland is unremarkable. MEDIASTINUM: Upper left paratracheal node measures 2.2 x 3.4 cm (2, 8), 
previously 2.3 x 3.5 cm. Superior prevascular node measures 2.9 x 3.0 cm, 
previously 3.5 x 3.8 cm. Poorly delineated subcarinal node measures about 3.4 x 
4.8 cm, previously 3.6 x 4.8 cm. Precardiac nodes measure 1.9 x 2.5 cm and 1.7 x 
2.4 cm (2, 39), previously 2.1 x 2.7 cm and 2.0 x 2.0 cm. SWATI: No mass or lymphadenopathy. THORACIC AORTA: No dissection or aneurysm. MAIN PULMONARY ARTERY: Normal in caliber. TRACHEA/BRONCHI: Patent. ESOPHAGUS: No wall thickening or dilatation. HEART: Normal in size. PLEURA: No effusion or pneumothorax. LUNGS: No nodule, mass, or airspace disease. LIVER: Multiple hypodense lesions suspicious for metastases, measuring up to 2.5 
x 4.0 cm in segment 2 (2, 43) series, previously 2.8 x 3.7 cm, 3.2 x 4.6 cm in 
segment 3 (2, 55), previously 3.1 x 4.5 cm, and partially exophytic from the 
posterior inferior right lobe segment 6 measuring 2.5 x 3.5 cm, previously 2.8 x 
3.4 cm. GALLBLADDER: Unremarkable. SPLEEN: Central 1.9 x 3.1 cm hypodensity (2, 47), previously 1.9 x 2.3 cm. Otherwise unremarkable. PANCREAS: Inseparable from bulky central abdominal adenopathy with no identified 
ductal dilation. ADRENALS: Unremarkable. KIDNEYS: Right hydronephrosis has improved status post right nephroureteral 
stent placement. Kidneys otherwise appear unremarkable. STOMACH: Unremarkable. SMALL BOWEL: No dilatation or wall thickening. COLON: No dilation or wall thickening. APPENDIX: Unremarkable. PERITONEUM: Extensive peritoneal implants with representative largest lesions 
measuring 4.4 x 6.1 cm and the left adnexal region (2, 98), previously 4.3 x 5.7 
cm, 3.8 x 5.9 cm in the anterior midline (2, 81), previously 3.4 x 5.4 cm, and 
2.8 x 4.7 cm in the right abdomen (2, 78), previously 3.2 x 4.7 cm. RETROPERITONEUM: Extensive rocio hepatis, retroperitoneal, and pelvic sidewall 
adenopathy. Conglomerate nkechi mass in the rocio hepatis region measures 5.7 x 
11.1 cm (2, 58) is, previously 6.3 x 12.2 cm. Aortocaval node measures 3.7 x 5.3 
cm (2, 77), previously 4.5 x 5.4 cm. Right pelvic sidewall node measures 2.9 x 
4.2 cm (2, 103), previously 3.4 x 4.2 cm. Left external iliac node measures 3.0 
x 4.8 cm (2, 101), previously 3.3 x 4.8 cm. REPRODUCTIVE ORGANS: Uterus and ovaries are surgically absent. URINARY BLADDER: No mass or calculus. Distal end of right nephroureteral stent 
positioned within bladder lumen. BONES: Degenerative spine change. No acute fracture or aggressive lesion. ADDITIONAL COMMENTS: Mass centered at the right sternocostal junction measures 5.3 x 5.4 cm (2, 21), previously 4.8 x 5.7 cm. Post surgical changes in the 
anterior abdominal wall. Left Port-A-Cath in place. Bilateral inguinal 
adenopathy measuring 2.1 x 3.2 cm on the right (2, 115), previously 2.3 x 3.7 cm 
and 2.7 x 2.8 cm on the left (2, 108), previously 2.9 x 3.1 cm. Crystal Elif IMPRESSION 
 IMPRESSION: Overall response to disease with either decrease in size or no 
significant change in extensive nkechi metastases, hepatic metastases, right 
sternal metastasis, and peritoneal carcinomatosis as above. No new metastasis or 
progressive metastasis identified. Status post right nephroureteral stent with 
interval resolution of right hydronephrosis. CT C/A/P 2/5/19: 
FINDINGS:  
THYROID: No nodule. MEDIASTINUM: Mediastinal lymphadenopathy is unchanged. A reference superior 
mediastinal lymph node is stable measuring 3.3 cm. SWATI: No mass or lymphadenopathy. THORACIC AORTA: No dissection or aneurysm. MAIN PULMONARY ARTERY: Normal in caliber. TRACHEA/BRONCHI: Patent. ESOPHAGUS: No wall thickening or dilatation. HEART: Normal in size. PLEURA: No effusion or pneumothorax. LUNGS: No nodule, mass, or airspace disease. LIVER: Evaluation of the liver is limited by lack of intravenous contrast. There 
is a vague 4.4 cm hypodense lesion in the left hepatic lobe previously measuring 3.0 cm. Lesion in the right hepatic lobe now measures 3.3 cm, previously 
measuring 2.7 cm. GALLBLADDER: Unremarkable. SPLEEN: Central soft tissue abnormality has increased in size, now measuring 4.9 
cm, previously measuring 3.1 cm. PANCREAS: Not well evaluated given the massive lymphadenopathy and lack of 
intravenous contrast. 
ADRENALS: Unremarkable. KIDNEYS: There is no moderate right hydronephrosis. STOMACH: Unremarkable. SMALL BOWEL: No dilatation or wall thickening. COLON: No dilatation or wall thickening. APPENDIX: Not visualized. PERITONEUM: Bulky gastrohepatic, periceliac, portacaval, and mesenteric 
lymphadenopathy has increased. There is secondary invasion of the liver. Maximum 
dimension in the upper abdomen is currently 10.4 cm, previously 9.3 cm. Elemental soft tissue nodules have increased in size, with a reference 7.0 cm 
nodule in the left abdomen previously measuring 5.5 cm.  Soft tissue nodule in 
 the deep pelvis between the bladder and colon has increased in size as well. RETROPERITONEUM: No lymphadenopathy or aortic aneurysm. REPRODUCTIVE ORGANS: The uterus and ovaries are surgically absent. URINARY BLADDER: No mass or calculus. BONES: Soft tissue mass involving the right sternum has not changed. Degenerative changes are seen in the thoracic and lumbar spine. ADDITIONAL COMMENTS: N/A IMPRESSION IMPRESSION: 
Stable mediastinal lymphadenopathy. Progressive abdominal lymphadenopathy as 
described above. Liver lesions have increased in size compared to the prior 
exam. Stable right sternal mass. Moderate right hydroureteronephrosis is now 
noted which appears to be related to the lymphadenopathy. ROS Constitutional: no weight loss, fever, night sweats Respiratory: no cough, shortness of breath, or wheezing Cardiovascular: no chest pain or dyspnea on exertion. Reports chest wall mass smaller, no associated pain. Heme: No abnormal bleeding Gastrointestinal: no abdominal pain, change in bowel habits, or black or bloody stools Genito-Urinary: no dysuria, trouble voiding, or hematuria Musculoskeletal: + swelling in ankle - bilateral, mostly only at end of day Neurological: mild neuropathy Derm: pigmentation/induration medial left leg. Prior injury from fall. Psych: positive for depression - controlled PMH: 
Past Medical History:  
Diagnosis Date  Adverse effect of anesthesia   
 sleep apnea cpap machine useage  Anemia  Arthritis DDD low back and knees  Asthma   
 uses albuterol prn only; none x 6 mos. Never hospitalized  BRCA negative 1/2013  Calculus of kidney  Chronic kidney disease   
 kidney stones  Chronic pain   
 knee pain bilat  CINV (chemotherapy-induced nausea and vomiting) 8/21/2014  Decreased hearing, right PATIENT STATES THIS IS DUE TO CHEMOTHERAPY  Diabetes ([facility] [address] Age 46 IDDM  Environmental allergies  Fibromyalgia  GERD (gastroesophageal reflux disease)   
 controlled with med  Hx of pulmonary embolus during pregnancy 2015  Hydronephrosis due to obstruction of ureter 3/18/2019  Hypertension  Ill-defined condition Sepsis  -  SOURCE PORT  
 Ill-defined condition 2016  
 chemotherapy  Mass of chest wall 2018  Morbid obesity ([facility] [address]  Murmur, heart  Other and unspecified hyperlipidemia  Ovarian cancer ([facility] [address] 2012, 2014  
 serous, high grade, stage IIIc. s/p chemotx (has port)  Psychiatric disorder Depression/Anxiety  Rectal bleeding  Thromboembolus ([address]  POST PARTUM; resolved- PELVIS  Thyroid disease HYPOTHYROID  Unspecified sleep apnea CPAP, Dr. Edil Prater PSH: 
Past Surgical History:  
Procedure Laterality Date  BREAST SURGERY PROCEDURE UNLISTED Lymph node in left breast 2017 Walter  
  X2  
 HX HYSTERECTOMY  2012 ROULA/BSO, tumor debulking, omentectomy for ovarian cancer  HX ORTHOPAEDIC Right   
 ankle-FX, R  
 HX ORTHOPAEDIC Right   
 FX R ARM  
 HX UROLOGICAL Right  STENT FOR KIDNEY STONES  
 HX VASCULAR ACCESS  2015  
 right chest- port placed  HX VASCULAR ACCESS Left 2017 TETE CATH  
 
SOC: 
Social History Socioeconomic History  Marital status:  Spouse name: Not on file  Number of children: Not on file  Years of education: Not on file  Highest education level: Not on file Occupational History  Not on file Social Needs  Financial resource strain: Not on file  Food insecurity Worry: Not on file Inability: Not on file  Transportation needs Medical: Not on file Non-medical: Not on file Tobacco Use  Smoking status: Never Smoker  Smokeless tobacco: Never Used Substance and Sexual Activity  Alcohol use: Yes Comment: 1 drink per month  Drug use: No  
 Sexual activity: Yes Lifestyle  Physical activity Days per week: Not on file Minutes per session: Not on file  Stress: Not on file Relationships  Social connections Talks on phone: Not on file Gets together: Not on file Attends Mu-ism service: Not on file Active member of club or organization: Not on file Attends meetings of clubs or organizations: Not on file Relationship status: Not on file  Intimate partner violence Fear of current or ex partner: Not on file Emotionally abused: Not on file Physically abused: Not on file Forced sexual activity: Not on file Other Topics Concern  Not on file Social History Narrative Lives in [facility] [place] [facility] alone.  passed away in 5/16 of a heart attack. Has 2 sons. Disability. 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 Enjoys swimming and going to the gym. Family History Family History Problem Relation Age of Onset  Hypertension Mother [facility] Arthritis-osteo Mother  Hypertension Father  Arthritis-osteo Father  Cancer Father PROSTATE  COPD Father  Hypertension Brother  Elevated Lipids Brother  Arthritis-osteo Brother  Hypertension Brother  Elevated Lipids Brother  Obesity Brother  Cancer Brother PROSTATE  Anesth Problems Son DELAYED AWAKENING  
 Diabetes Maternal Grandmother  Anesth Problems Paternal Grandmother PATIENT STATES GRANDMOTHER'S HEART STOPPED DURING SURGERY Medications: (reviewed) Current Outpatient Medications on File Prior to Visit Medication Sig Dispense Refill  doxazosin (CARDURA) 1 mg tablet Take 1 mg by mouth daily.  SULFAMETHOXAZOLE-TRIMETHOPRIM PO Take  by mouth two (2) times a day. 7 days  polyethylene glycol (MIRALAX) 17 gram packet Take 1 Packet by mouth daily for 60 days.  To prevent constipation with pain medication or any cause 60 Packet 0  
  dexAMETHasone (DECADRON) 4 mg tablet Take 2 tablets with breakfast the day before chemo and for 2 days after chemo 36 Tab 2  
 lidocaine-prilocaine (EMLA) topical cream Apply small amount over port area and cover with band aid one hour before chemo 30 g 3  
 ondansetron (ZOFRAN ODT) 4 mg disintegrating tablet Take 1 Tab by mouth every eight (8) hours as needed for Nausea. Indications: nausea and vomiting caused by cancer drugs 30 Tab 2  predniSONE (DELTASONE) 50 mg tablet Take 1 tablet 13 hours prior to scan, take 1 tablet 7 hours prior to scan and take 1 tablet one hour prior to scan 3 Tab 0  Zejula 100 mg cap Take 100 mg by mouth daily. 60 Cap 4  
 fluticasone propionate (FLONASE) 50 mcg/actuation nasal spray 2 SPRAYS IN BOTH NOSTRILS DAILY 1 Bottle 0  
 omega-3 acid ethyl esters (LOVAZA) 1 gram capsule TAKE 1 CAPSULE BY MOUTH TWICE A  Cap 2  cycloSPORINE (RESTASIS) 0.05 % dpet  albuterol (PROVENTIL VENTOLIN) 2.5 mg /3 mL (0.083 %) nebu Take 2.5 mg by inhalation.  dextroamphetamine-amphetamine (ADDERALL) 20 mg tablet TAKE 1 TABLET BY MOUTH EVERY DAY  0  
 rosuvastatin (CRESTOR) 10 mg tablet TAKE 1 TABLET BY MOUTH EVERY DAY  0  
 telmisartan (MICARDIS) 20 mg tablet TAKE 1 TABLET BY MOUTH EVERY DAY 90 Tab 3  
 atorvastatin (LIPITOR) 20 mg tablet  BD ULTRA-FINE SHORT PEN NEEDLE 31 gauge x 5/16\" ndle USE TO INJECT INSULIN 5 TIMES DAILY 200 Pen Needle 11  
 insulin aspart U-100 (NOVOLOG FLEXPEN U-100 INSULIN) 100 unit/mL (3 mL) inpn INJECT 20 UNITS BEFORE EACH MEAL + 4 UNITS FOR EVERY 50 MG/DL ABOVE 150 MG/DL.  UNITS PER DAY 90 Adjustable Dose Pre-filled Pen Syringe 3  
 LEVEMIR FLEXTOUCH U-100 INSULN 100 unit/mL (3 mL) inpn INJECT 30 UNITS IN AM AND 50 UNITS AT NIGHT. INCREASE AS DIRECTED TO  UNITS/DAY. 90 Adjustable Dose Pre-filled Pen Syringe 3  DEXILANT 60 mg CpDB capsule (delayed release) TAKE 1 CAPSULE BY MOUTH EVERY DAY 90 Cap 3  
  LINZESS 145 mcg cap capsule TAKE 1 CAPSULE BY MOUTH EVERY DAYY 30 Cap 0  
 levothyroxine (SYNTHROID) 150 mcg tablet TAKE 1 TABLET BY MOUTH EVERY DAY BEFORE BREAKFAST 90 Tab 0  
 EPINEPHrine (EPIPEN) 0.3 mg/0.3 mL injection INJECT 0.3 ML BY INTRAMUSCULAR ROUTE ONCE AS NEEDED FOR UP TO 1 DOSE.  1  
 glucose blood VI test strips (TRUE METRIX GLUCOSE TEST STRIP) strip by Does Not Apply route See Admin Instructions. 30 Strip 5  
 lancets misc by Does Not Apply route daily. True Metrix lancets- test blood sugar once per day 30 Each 5  Blood-Glucose Meter monitoring kit Check up to tid, as directed 1 Kit 0  
 azelastine (ASTELIN) 137 mcg (0.1 %) nasal spray 1 Buckner by Both Nostrils route daily as needed. Use in each nostril as directed  insulin detemir U-100 (LEVEMIR) 100 unit/mL injection 20 Units by SubCUTAneous route daily (with lunch).  montelukast (SINGULAIR) 10 mg tablet Take 10 mg by mouth nightly.  ascorbic acid, vitamin C, (VITAMIN C) 1,000 mg tablet Take 1,000 mg by mouth daily.  albuterol (PROVENTIL VENTOLIN) 2.5 mg /3 mL (0.083 %) nebulizer solution 2.5 mg by Nebulization route every four (4) hours as needed for Wheezing.  Liraglutide (VICTOZA) 0.6 mg/0.1 mL (18 mg/3 mL) pnij 1.8 mg by SubCUTAneous route daily (with lunch). 3 Pen 3  
 SYMBICORT 160-4.5 mcg/actuation HFAA Take 2 Puffs by inhalation two (2) times daily as needed. 2 Inhaler 3  clonazePAM (KLONOPIN) 0.5 mg tablet Take 0.5 mg by mouth nightly as needed.  sertraline (ZOLOFT) 50 mg tablet TAKE 1 TABLET BY MOUTH as needed  1  cholecalciferol, VITAMIN D3, (VITAMIN D3) 5,000 unit tab tablet Take 5,000 Units by mouth daily.  furosemide (LASIX) 40 mg tablet Take 40 mg by mouth every other day. 3  
 amoxicillin-clavulanate (AUGMENTIN) 875-125 mg per tablet No current facility-administered medications on file prior to visit. Allergies: (reviewed) Allergies Allergen Reactions  Carboplatin Other (comments) Patient developed shortness of breath, felt faint, coughing, skin flushed and \"itchy\". This occurred on the patients 8th treatment.  Iodinated Contrast Media Rash 13 hr pre-medication prior to IV contrast.  
Patient has done well with 1 hr pre-medication of (Solu-Medrol) 40mg &  (Benadryl) 50mg.  Lipitor [Atorvastatin] Myalgia  Shellfish Containing Products Hives  Tape [Adhesive] Itching and Other (comments) \"op site-- clear,thin tape\"--caused blister  Tomato Hives  Iodine And Iodide Containing Products Rash  Olmesartan Other (comments)  Losartan Other (comments)  
  headaches  Percocet [Oxycodone-Acetaminophen] Other (comments) Fever; however, current home med OBJECTIVE Physical Exam 
Visit Vitals LMP 09/07/2011 *deferred today given video-conference visit for ongoing COVID-19 pandemic* Physical Exam: 
General: Alert and oriented. No acute distress. Well-nourished HEENT: No thyroid enlargment. Neck supple without restrictions. Sclera normal. Normal occular motion. Moist mucous membranes. Lymphatics: No evidence of axillary, cervical, or subclavicular adenopathy. Respiratory: clear to auscultation and percussion to the bases. No CVAT. Chest wall mass is still palpable along the superior sternum at the level of the superior aspect of the breasts, but improved. Normal overlying skin Cardiovascular: regular rate and rhythm. No murmurs, rubs, or gallops. Gastrointestinal: soft, non-tender, non-distended, no masses or organomegaly. Well-healed incision. Musculoskeletal: normal gait. No joint tenderness, deformity or swelling. No muscular tenderness. Extremities: extremities normal, atraumatic, mild 1+ edema bilaterally. Pelvic: deferred today Neuro: Grossly intact. Normal gait and movement. No acute deficit Skin: No evidence of rashes or skin changes. DATE REVIEW as available: 
Lab Results Component Value Date/Time WBC 3.2 (L) 08/20/2020 08:18 AM  
 Hemoglobin (POC) 10.5 (L) 03/20/2017 12:15 PM  
 HGB 7.4 (L) 08/20/2020 08:18 AM  
 Hematocrit (POC) 31 (L) 03/20/2017 12:15 PM  
 HCT 24.2 (L) 08/20/2020 08:18 AM  
 PLATELET 969 34/09/4131 08:18 AM  
 MCV 97.6 08/20/2020 08:18 AM  
 
Lab Results Component Value Date/Time  
 ABS. NEUTROPHILS 2.0 08/20/2020 08:18 AM  
 
Lab Results Component Value Date/Time Sodium 141 08/20/2020 08:18 AM  
 Potassium 4.7 08/20/2020 08:18 AM  
 Chloride 115 (H) 08/20/2020 08:18 AM  
 CO2 20 (L) 08/20/2020 08:18 AM  
 Anion gap 6 08/20/2020 08:18 AM  
 Glucose 155 (H) 08/20/2020 08:18 AM  
 BUN 44 (H) 08/20/2020 08:18 AM  
 Creatinine 1.63 (H) 08/20/2020 08:18 AM  
 BUN/Creatinine ratio 27 (H) 08/20/2020 08:18 AM  
 GFR est AA 39 (L) 08/20/2020 08:18 AM  
 GFR est non-AA 32 (L) 08/20/2020 08:18 AM  
 Calcium 9.1 08/20/2020 08:18 AM  
 Bilirubin, total 0.4 08/06/2020 08:12 AM  
 Alk. phosphatase 215 (H) 08/06/2020 08:12 AM  
 Protein, total 8.9 (H) 08/06/2020 08:12 AM  
 Albumin 3.4 (L) 08/06/2020 08:12 AM  
 Globulin 5.5 (H) 08/06/2020 08:12 AM  
 A-G Ratio 0.6 (L) 08/06/2020 08:12 AM  
 ALT (SGPT) 66 08/06/2020 08:12 AM  
 
 
ECHO 7/17/18 Left ventricle: Systolic function was normal. Ejection fraction was estimated in the range of 55 % to 60 %. There were no regional wall motion 
abnormalities. Wall thickness was mildly increased. Left atrium: The atrium was mildly dilated. Mitral valve: There was mild regurgitation. Aortic valve: Leaflets exhibited sclerosis without stenosis. Not well visualized. IMPRESSION AND PLAN: 
Ms. Virgil Jiménez is a 62 y.o. female with recurrent, metastatic ovarian cancer. Heavily pre-treated. Lost to follow-up since 9/2018, at which time patient self-discontinued weekly paclitaxel. Initiated Jerry Alamo on 1/23/19. Held after 1 month secondary to rising creatinine.  Found to have right hydronephrosis. Right ureteral stent placed 3/18/19 with normalization of creatinine. Completed 2700 cGy to the sternun 7/11/19. Restarted Zejula at 100mg/daily on 4/23/19. Now with rising Ca-125 and progression on CT 6/24/2020. Initiated on weekly Taxol 80mg/m2 days 1, 8, and 15 in a 28-day cycle on 8/6/2020. Problem List Items Addressed This Visit Circulatory Pulmonary hypertension, mild ([address] Hypertension Endocrine Type 2 diabetes mellitus with nephropathy ([address] Hypothyroidism Immune Lymphadenopathy, mediastinal  
  
 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 Presents today for consideration of cycle 2 weekly paclitaxel. Tolerated cycle 1 well. Will follow-up pre-chemo labs. Will plan for 3-4 cycles followed by imaging. Would plan to treat until progression, or may consider maintenance PARPi after 6 cycles with either olaparib or rucaparib. Urology is managing her stent and recurrent UTIs. She is on antibiotic suppression given the continued recurrence of her UTI. Recent sinus infection, which she will complete antibiotics this coming Monday. All questions and concerns were addressed with the patient and she is comfortable with the plan. Deo Pathak MD 
 
 
Juan Barba, who was evaluated through a patient-initiated, synchronous (real-time) audio only encounter, and/or her healthcare decision maker, is aware that it is a billable service, with coverage as determined by her insurance carrier. She provided verbal consent to proceed: Yes. She has not had a related appointment within my department in the past 7 days or scheduled within the next 24 hours. Total Time: minutes: 11-20 minutes Deo Pathak MD

## 2020-08-25 NOTE — PROGRESS NOTES
pre chemo for C2D1 Taxol Carbo on 9/3/2020 at [facility] Pt states she has not been feeling well, diagnosed with sinus infection and was put on AMOX 1. Have you been to the ER, urgent care clinic since your last visit? Hospitalized since your last visit?  no 
 
2. Have you seen or consulted any other health care providers outside of the [address] [place] [address] since your last visit? Include any pap smears or colon screening.    no

## 2020-08-28 NOTE — PROGRESS NOTES
The history is provided by the patient. Patient presented to [facility] for COVID testing. Patient has a high risk factor she is currently undergoing chemo. Patient denies any abnormal medical symptoms at this time. Patient denies SOB and Fever. Past Medical History:  
Diagnosis Date  Adverse effect of anesthesia   
 sleep apnea cpap machine useage  Anemia  Arthritis DDD low back and knees  Asthma   
 uses albuterol prn only; none x 6 mos. Never hospitalized  BRCA negative 2013  Calculus of kidney  Chronic kidney disease   
 kidney stones  Chronic pain   
 knee pain bilat  CINV (chemotherapy-induced nausea and vomiting) 2014  Decreased hearing, right PATIENT STATES THIS IS DUE TO CHEMOTHERAPY  Diabetes ([address] Age 46 IDDM  Environmental allergies  Fibromyalgia  GERD (gastroesophageal reflux disease)   
 controlled with med  Hx of pulmonary embolus during pregnancy 2015  Hydronephrosis due to obstruction of ureter 3/18/2019  Hypertension  Ill-defined condition Sepsis  -  SOURCE PORT  
 Ill-defined condition 2016  
 chemotherapy  Mass of chest wall 2018  Morbid obesity ([address]  Murmur, heart  Other and unspecified hyperlipidemia  Ovarian cancer ([address] 2012, 2014  
 serous, high grade, stage IIIc. s/p chemotx (has port)  Psychiatric disorder Depression/Anxiety  Rectal bleeding  Thromboembolus ([address]  POST PARTUM; resolved- PELVIS  Thyroid disease HYPOTHYROID  Unspecified sleep apnea CPAP, Dr. Forrest Keith Past Surgical History:  
Procedure Laterality Date  BREAST SURGERY PROCEDURE UNLISTED Lymph node in left breast 2017 [place]  
  X2  
 HX HYSTERECTOMY  2012 ROULA/BSO, tumor debulking, omentectomy for ovarian cancer  HX ORTHOPAEDIC Right   
 ankle-FX, R  
 HX ORTHOPAEDIC Right   
 FX R ARM  
  HX UROLOGICAL Right 2017 STENT FOR KIDNEY STONES  
 HX VASCULAR ACCESS  11/4/2015  
 right chest- port placed  HX VASCULAR ACCESS Left 2017 TETE CATH Family History Problem Relation Age of Onset  Hypertension Mother Duffy Arthritis-osteo Mother  Hypertension Father  Arthritis-osteo Father  Cancer Father PROSTATE  COPD Father  Hypertension Brother  Elevated Lipids Brother  Arthritis-osteo Brother  Hypertension Brother  Elevated Lipids Brother  Obesity Brother  Cancer Brother PROSTATE  Anesth Problems Son DELAYED AWAKENING  
 Diabetes Maternal Grandmother  Anesth Problems Paternal Grandmother PATIENT STATES GRANDMOTHER'S HEART STOPPED DURING SURGERY Social History Socioeconomic History  Marital status:  Spouse name: Not on file  Number of children: Not on file  Years of education: Not on file  Highest education level: Not on file Occupational History  Not on file Social Needs  Financial resource strain: Not on file  Food insecurity Worry: Not on file Inability: Not on file  Transportation needs Medical: Not on file Non-medical: Not on file Tobacco Use  Smoking status: Never Smoker  Smokeless tobacco: Never Used Substance and Sexual Activity  Alcohol use: Yes Comment: 1 drink per month  Drug use: No  
 Sexual activity: Yes Lifestyle  Physical activity Days per week: Not on file Minutes per session: Not on file  Stress: Not on file Relationships  Social connections Talks on phone: Not on file Gets together: Not on file Attends Zoroastrian service: Not on file Active member of club or organization: Not on file Attends meetings of clubs or organizations: Not on file Relationship status: Not on file  Intimate partner violence Fear of current or ex partner: Not on file Emotionally abused: Not on file Physically abused: Not on file Forced sexual activity: Not on file Other Topics Concern  Not on file Social History Narrative Lives in [place].  passed away in 5/16 of a heart attack. Has 2 sons. Disability. 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 Enjoys swimming and going to the gym. ALLERGIES: Carboplatin; Iodinated contrast media; Lipitor [atorvastatin]; Shellfish containing products; Tape [adhesive]; Tomato; Iodine and iodide containing products; Olmesartan; Losartan; and Percocet [oxycodone-acetaminophen] Review of Systems Constitutional: Negative. HENT: Negative. Respiratory: Negative. Cardiovascular: Negative. Gastrointestinal: Negative. Musculoskeletal: Negative. Neurological: Negative. Vitals:  
 08/28/20 1102 Pulse: 96  
Resp: 16 Temp: 98.3 °F (36.8 °C) SpO2: 98% Physical Exam 
Constitutional:   
   Appearance: Normal appearance. She is well-developed. Cardiovascular:  
   Rate and Rhythm: Normal rate. Pulses: Normal pulses. Pulmonary:  
   Effort: Pulmonary effort is normal.  
Neurological:  
   Mental Status: She is alert and oriented to person, place, and time. Psychiatric:     
   Mood and Affect: Mood normal.  
 
 
 
MDM Differential Diagnosis; Clinical Impression; Plan:  
  (Z11.59) Screening for viral disease  (primary encounter diagnosis) No orders of the defined types were placed in this encounter. Tested patient for COVID-19. Patient given education material.  The condition was discussed with the patient and they understand. If symptoms worsen the pt is to go to the ER. Advised patient to take tylenol for discomfort. Advised to quarantine self.    
 
 
This patient was seen in [facility] at [address] [place] [address] Urgent Care while in their vehicle due to COVID-19 pandemic with PPE and focused examination in order to decrease community viral transmission. The patient/guardian gave verbal consent to treat. Procedures

## 2020-09-03 NOTE — PROGRESS NOTES
[facility] Progress Note Date: September 3, 2020 
 
 
0800 Pt arrived at [address] [place] [address] ambulatory and in no distress. Assessment completed, no new complaints voiced. Port accessed, positive blood return. Labs drawn and sent for processing. 1000 Lab results revealed Hgb = 6.8. Per team, okay to proceed with treatment. 2 units of pRBCs ordered for infusion today. Chemotherapy Flowsheet 9/3/2020 Cycle C2D1 Date 9/3/2020 Drug / Regimen Taxol Dosage -  
Pre Hydration -  
Pre Meds Given Notes Given + blood Lab results were obtained and reviewed. Recent Results (from the past 12 hour(s)) CBC WITH AUTOMATED DIFF Collection Time: 09/03/20  8:10 AM  
Result Value Ref Range WBC 4.0 3.6 - 11.0 K/uL  
 RBC 2.28 (L) 3.80 - 5.20 M/uL HGB 6.8 (L) 11.5 - 16.0 g/dL HCT 22.4 (L) 35.0 - 47.0 % MCV 98.2 80.0 - 99.0 FL  
 MCH 29.8 26.0 - 34.0 PG  
 MCHC 30.4 30.0 - 36.5 g/dL  
 RDW 14.9 (H) 11.5 - 14.5 % PLATELET 924 699 - 195 K/uL MPV 9.3 8.9 - 12.9 FL  
 NRBC 0.0 0  WBC ABSOLUTE NRBC 0.00 0.00 - 0.01 K/uL NEUTROPHILS 54 32 - 75 % LYMPHOCYTES 23 12 - 49 % MONOCYTES 20 (H) 5 - 13 % EOSINOPHILS 2 0 - 7 % BASOPHILS 1 0 - 1 % IMMATURE GRANULOCYTES 1 (H) 0.0 - 0.5 % ABS. NEUTROPHILS 2.2 1.8 - 8.0 K/UL  
 ABS. LYMPHOCYTES 0.9 0.8 - 3.5 K/UL  
 ABS. MONOCYTES 0.8 0.0 - 1.0 K/UL  
 ABS. EOSINOPHILS 0.1 0.0 - 0.4 K/UL  
 ABS. BASOPHILS 0.0 0.0 - 0.1 K/UL  
 ABS. IMM. GRANS. 0.0 0.00 - 0.04 K/UL  
 DF AUTOMATED METABOLIC PANEL, COMPREHENSIVE Collection Time: 09/03/20  8:10 AM  
Result Value Ref Range Sodium 142 136 - 145 mmol/L Potassium 4.5 3.5 - 5.1 mmol/L Chloride 112 (H) 97 - 108 mmol/L  
 CO2 24 21 - 32 mmol/L Anion gap 6 5 - 15 mmol/L Glucose 110 (H) 65 - 100 mg/dL BUN 32 (H) 6 - 20 MG/DL Creatinine 1.46 (H) 0.55 - 1.02 MG/DL  
 BUN/Creatinine ratio 22 (H) 12 - 20 GFR est AA 44 (L) >60 ml/min/1.73m2 GFR est non-AA 37 (L) >60 ml/min/1.73m2 Calcium 9.1 8.5 - 10.1 MG/DL Bilirubin, total 0.4 0.2 - 1.0 MG/DL  
 ALT (SGPT) 20 12 - 78 U/L  
 AST (SGOT) 29 15 - 37 U/L Alk. phosphatase 90 45 - 117 U/L Protein, total 8.0 6.4 - 8.2 g/dL Albumin 3.2 (L) 3.5 - 5.0 g/dL Globulin 4.8 (H) 2.0 - 4.0 g/dL A-G Ratio 0.7 (L) 1.1 - 2.2 MAGNESIUM Collection Time: 09/03/20  8:10 AM  
Result Value Ref Range Magnesium 2.2 1.6 - 2.4 mg/dL TYPE + CROSSMATCH Collection Time: 09/03/20  8:10 AM  
Result Value Ref Range Crossmatch Expiration 09/06/2020 ABO/Rh(D) O POSITIVE Antibody screen NEG Unit number [id number] Blood component type  LR Unit division 00 Status of unit ISSUED Crossmatch result Compatible Unit number [id number] Blood component type  LR,1 Unit division 00 Status of unit ISSUED Crossmatch result Compatible Pre-medications  were administered as ordered and chemotherapy was initiated. Medications Administered 0.9% sodium chloride infusion Admin Date 
09/03/2020 Action New Bag Dose 25 mL/hr Rate 25 mL/hr Route IntraVENous Administered By 
Arnaldo Mates  
  
  
 0.9% sodium chloride infusion 250 mL Admin Date 
09/03/2020 Action New Bag Dose 
250 mL Rate 15 mL/hr Route IntraVENous Administered By 
Arnaldo Mates  
  
  
 dexamethasone (DECADRON) 12 mg in 0.9% sodium chloride 50 mL, overfill volume 5 mL IVPB Admin Date 
09/03/2020 Action Given Dose 
12 mg Rate 232 mL/hr Route IntraVENous Administered By 
Arnaldo Mates  
  
  
 diphenhydrAMINE (BENADRYL) injection 50 mg   
 Admin Date 
09/03/2020 Action Given Dose 50 mg Route IntraVENous Administered By 
Arnaldo Mates  
  
  
 famotidine (PF) (PEPCID) 20 mg in 0.9% sodium chloride 10 mL injection Admin Date 
09/03/2020 Action Given Dose 
20 mg Route IntraVENous Administered By 
Arnaldo Mates heparin (porcine) pf 300-500 Units Admin Date 
09/03/2020 Action Given Dose 
500 Units Route InterCATHeter Administered By 
Jael Cameron PACLitaxeL (TAXOL) 197 mg in 0.9% sodium chloride 250 mL, overfill volume 25 mL chemo infusion Admin Date 
09/03/2020 Action New Bag Dose 
197 mg Rate 
307.8 mL/hr Route IntraVENous Administered By 
Jael Cameron  
  
  
 palonosetron HCl (ALOXI) injection 0.25 mg   
 Admin Date 
09/03/2020 Action Given Dose 0.25 mg Route IntraVENous Administered By 
Jael Cameron  
  
  
 saline peripheral flush soln 10 mL Admin Date 
09/03/2020 Action Given Dose 
10 mL Route InterCATHeter Administered By 
[id number] [employer] [address], [address] [place] [address] Patient tolerated treatment well. Port maintained positive blood return throughout treatment. Port flushed per policy. Signs/symptoms of adverse blood reaction discussed with pt, voiced understanding. 1245:  1st unit PRBCs started and infusing without difficulty, observed x 15 minutes 1500:  1st unit completed without adverse reaction noted, NS flushing line. 1520:  2nd unit PRBCs started and infusing without difficulty, observed x 15 minutes 1720:  2nd unit completed without adverse reaction noted, NS flushing line. Patient Vitals for the past 12 hrs: 
 Temp Pulse Resp BP  
09/03/20 1720 97.7 °F (36.5 °C) 63 18 158/72  
09/03/20 1620 98.3 °F (36.8 °C) 80 18 140/79  
09/03/20 1550 97.8 °F (36.6 °C) 61 18 142/66  
09/03/20 1535 98.5 °F (36.9 °C) 81 18 140/67  
09/03/20 1515 98.4 °F (36.9 °C) 80 18 145/69  
09/03/20 1500 98.6 °F (37 °C) 80 18 142/82  
09/03/20 1445 97.9 °F (36.6 °C) 86 18 158/82  
09/03/20 1345 97.8 °F (36.6 °C) 79 18 156/74  
09/03/20 1315 98.4 °F (36.9 °C) 78 18 147/79  
09/03/20 1300 97.9 °F (36.6 °C) 63 18 181/65  
09/03/20 1219 97.7 °F (36.5 °C) 74  179/85  
09/03/20 0806 97.5 °F (36.4 °C) 95 18 154/79  
 
 
1745 Tolerated transfusion  well, no adverse reaction noted.   D/C instructions reviewed, copy to pt, voiced understanding. Patient declined 1 hour post transfusion observation. D/Cd from [facility] and in no distress. Aware of next scheduled [facility] appointment on 9/10/2020. Future Appointments Date Time Provider Gabriel Phillip 9/10/2020  8:00 AM A1 [facility] [address]   
9/17/2020  8:00 AM A1 [facility]  
9/22/2020  2:00 PM Nicole Levin MD [facility] CHIKI Abbasi RN September 3, 2020

## 2020-09-03 NOTE — PROGRESS NOTES
[address] [facility], [address] 
P [phone]  F [phone] Office Visit Patient ID: 
Name: Koki Daniel MRN: [id number] : 1960/60 y.o. Visit date: 9/3/2020 LEONID Joel is a 62 y.o.  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 The patient's previous treatment include primary debulking and adjuvant Taxol/carbo with retreatment in  for recurrence. Subsequent lines of chemo as outlined below. She has most recently started single agent weekly Taxol on 18 following progression with chest wall involvement while on PARPi. Most recent EOD imaging in 2020 shows a variable response to her prior therapy though with mostly increased disease including liver, mediastium and abd with progressive right hydronephrosis. She is s/p stent placement.   
   
OncTx History: 
12: MARAH/BSO/Cytoreductive surgery on 12 noted carcinomatosis/omental caking. 10/2012-2013: 6 cycles Carbo/Taxol 2014-2014: 6 Cycles carbo/Taxol 3/2015-5/13/15: 3 cycles Avastin/Doxil until progression 2015-2015 Weekly Topotecan  
2015-3/2016: Weekly Topotecan 2017-2017Gleda Brooms D1/D8 Q28 days for 6 cycles 2018: CT core needle bx chest wall mass: Metastatic high-grade serous carcinoma (see comment) Positive for malignancy. 2018-2018: Taxol weekly 2019: 10 fx 30cGy palliative RT to sternal lesion 2019 - 2020 [place]. Underwent right ureteral stent placement on 3/18/19. CT C/A/P 2020 with mixed response, but overall progression. Rising Ca-125 now 268. 
2020: CT CAP variable response to therapy with several lesions decreased in size. However a majority of the lesions have increased in size consistent with progression of disease with involvement of the mediastinum, liver masses, and peritoneal carcinomatosis.  Increase right-sided hydronephrosis. [facility] 8/2020 - initiated Taxol weekly SUBJECTIVE: 
Ms. Yue Ward presents today for chemotherapy. She once again reports increasing fatigue and shortness of breath over the past several days. She states that she has been feeling like she is going to \"pass out\" ans has been seeing blacks spots when up out of bed. She has tried to remain active. No nausea/vomiting. States that her appetite has been good. Able to eat and drink what she wants. No problems with her bowels. Taking miralax. She has chronic pedal/hand numbness/tingling due to taxol and DM. Is managed by a podiatrist.  States that he has ordered an EMG of both lower extremities. Neuropathy managed at this time. No pain. Reports having developed a rash over the right shoulder blade about a week or two ago. Rash was burning and pruritic. Lasted about a day and resolved on its own. ROS 10 pt negative except as per above PMH: 
Past Medical History:  
Diagnosis Date  Adverse effect of anesthesia   
 sleep apnea cpap machine useage  Anemia  Arthritis DDD low back and knees  Asthma   
 uses albuterol prn only; none x 6 mos. Never hospitalized  BRCA negative 1/2013  Calculus of kidney  Chronic kidney disease   
 kidney stones  Chronic pain   
 knee pain bilat  CINV (chemotherapy-induced nausea and vomiting) 8/21/2014  Decreased hearing, right PATIENT STATES THIS IS DUE TO CHEMOTHERAPY  Diabetes ([address] Age 46 IDDM  Environmental allergies  Fibromyalgia  GERD (gastroesophageal reflux disease)   
 controlled with med  Hx of pulmonary embolus during pregnancy 1/18/2015  Hydronephrosis due to obstruction of ureter 3/18/2019  Hypertension  Ill-defined condition Sepsis 9-2015 -  SOURCE PORT  
 Ill-defined condition 2016  
 chemotherapy  Mass of chest wall 05/2018  Morbid obesity ([address]  Murmur, heart  Other and unspecified hyperlipidemia  Ovarian cancer ([facility] 2012, 2014  
 serous, high grade, stage IIIc. s/p chemotx (has port)  Psychiatric disorder Depression/Anxiety  Rectal bleeding  [facility]  POST PARTUM; resolved- PELVIS  Thyroid disease HYPOTHYROID  Unspecified sleep apnea CPAP, Dr. Gay Caceres PSH: 
Past Surgical History:  
Procedure Laterality Date  BREAST SURGERY PROCEDURE UNLISTED Lymph node in left breast 2017 Dorothy  
  X2  
 HX HYSTERECTOMY  2012 ROULA/BSO, tumor debulking, omentectomy for ovarian cancer  HX ORTHOPAEDIC Right   
 ankle-FX, R  
 HX ORTHOPAEDIC Right   
 FX R ARM  
 HX UROLOGICAL Right  STENT FOR KIDNEY STONES  
 HX VASCULAR ACCESS  2015  
 right chest- port placed  HX VASCULAR ACCESS Left  TETE CATH  
 
SOC: 
Social History Socioeconomic History  Marital status:  Spouse name: Not on file  Number of children: Not on file  Years of education: Not on file  Highest education level: Not on file Occupational History  Not on file Social Needs  Financial resource strain: Not on file  Food insecurity Worry: Not on file Inability: Not on file  Transportation needs Medical: Not on file Non-medical: Not on file Tobacco Use  Smoking status: Never Smoker  Smokeless tobacco: Never Used Substance and Sexual Activity  Alcohol use: Yes Comment: 1 drink per month  Drug use: No  
 Sexual activity: Yes Lifestyle  Physical activity Days per week: Not on file Minutes per session: Not on file  Stress: Not on file Relationships  Social connections Talks on phone: Not on file Gets together: Not on file Attends Yazidi service: Not on file Active member of club or organization: Not on file Attends meetings of clubs or organizations: Not on file Relationship status: Not on file  Intimate partner violence Fear of current or ex partner: Not on file Emotionally abused: Not on file Physically abused: Not on file Forced sexual activity: Not on file Other Topics Concern  Not on file Social History Narrative Lives in [facility] alone.  passed away in 5/16 of a heart attack. Has 2 sons. Disability. 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 Enjoys swimming and going to the gym. Family History Family History Problem Relation Age of Onset  Hypertension Mother [facility] Arthritis-osteo Mother  Hypertension Father  Arthritis-osteo Father  Cancer Father PROSTATE  COPD Father  Hypertension Brother  Elevated Lipids Brother  Arthritis-osteo Brother  Hypertension Brother  Elevated Lipids Brother  Obesity Brother  Cancer Brother PROSTATE  Anesth Problems Son DELAYED AWAKENING  
 Diabetes Maternal Grandmother  Anesth Problems Paternal Grandmother PATIENT STATES GRANDMOTHER'S HEART STOPPED DURING SURGERY Medications: (reviewed) Current Outpatient Medications on File Prior to Encounter Medication Sig Dispense Refill  amoxicillin-clavulanate (AUGMENTIN) 875-125 mg per tablet  doxazosin (CARDURA) 1 mg tablet Take 1 mg by mouth daily.  SULFAMETHOXAZOLE-TRIMETHOPRIM PO Take  by mouth two (2) times a day. 7 days  polyethylene glycol (MIRALAX) 17 gram packet Take 1 Packet by mouth daily for 60 days.  To prevent constipation with pain medication or any cause 60 Packet 0  
 dexAMETHasone (DECADRON) 4 mg tablet Take 2 tablets with breakfast the day before chemo and for 2 days after chemo 36 Tab 2  
 lidocaine-prilocaine (EMLA) topical cream Apply small amount over port area and cover with band aid one hour before chemo 30 g 3  
 ondansetron (ZOFRAN ODT) 4 mg disintegrating tablet Take 1 Tab by mouth every eight (8) hours as needed for Nausea. Indications: nausea and vomiting caused by cancer drugs 30 Tab 2  predniSONE (DELTASONE) 50 mg tablet Take 1 tablet 13 hours prior to scan, take 1 tablet 7 hours prior to scan and take 1 tablet one hour prior to scan 3 Tab 0  Ze[place]ula 100 mg cap Take 100 mg by mouth daily. 60 Cap 4  
 fluticasone propionate (FLONASE) 50 mcg/actuation nasal spray 2 SPRAYS IN BOTH NOSTRILS DAILY 1 Bottle 0  
 omega-3 acid ethyl esters (LOVAZA) 1 gram capsule TAKE 1 CAPSULE BY MOUTH TWICE A  Cap 2  cycloSPORINE (RESTASIS) 0.05 % dpet  albuterol (PROVENTIL VENTOLIN) 2.5 mg /3 mL (0.083 %) nebu Take 2.5 mg by inhalation.  dextroamphetamine-amphetamine (ADDERALL) 20 mg tablet TAKE 1 TABLET BY MOUTH EVERY DAY  0  
 rosuvastatin (CRESTOR) 10 mg tablet TAKE 1 TABLET BY MOUTH EVERY DAY  0  
 telmisartan (MICARDIS) 20 mg tablet TAKE 1 TABLET BY MOUTH EVERY DAY 90 Tab 3  
 atorvastatin (LIPITOR) 20 mg tablet  BD ULTRA-FINE SHORT PEN NEEDLE 31 gauge x 5/16\" ndle USE TO INJECT INSULIN 5 TIMES DAILY 200 Pen Needle 11  
 insulin aspart U-100 (NOVOLOG FLEXPEN U-100 INSULIN) 100 unit/mL (3 mL) inpn INJECT 20 UNITS BEFORE EACH MEAL + 4 UNITS FOR EVERY 50 MG/DL ABOVE 150 MG/DL.  UNITS PER DAY 90 Adjustable Dose Pre-filled Pen Syringe 3  
 LEVEMIR FLEXTOUCH U-100 INSULN 100 unit/mL (3 mL) inpn INJECT 30 UNITS IN AM AND 50 UNITS AT NIGHT. INCREASE AS DIRECTED TO  UNITS/DAY. 90 Adjustable Dose Pre-filled Pen Syringe 3  DEXILANT 60 mg CpDB capsule (delayed release) TAKE 1 CAPSULE BY MOUTH EVERY DAY 90 Cap 3  
 LINZESS 145 mcg cap capsule TAKE 1 CAPSULE BY MOUTH EVERY DAYY 30 Cap 0  
 levothyroxine (SYNTHROID) 150 mcg tablet TAKE 1 TABLET BY MOUTH EVERY DAY BEFORE BREAKFAST 90 Tab 0  
 EPINEPHrine (EPIPEN) 0.3 mg/0.3 mL injection INJECT 0.3 ML BY INTRAMUSCULAR ROUTE ONCE AS NEEDED FOR UP TO 1 DOSE.  1  
  glucose blood VI test strips (TRUE METRIX GLUCOSE TEST STRIP) strip by Does Not Apply route See Admin Instructions. 30 Strip 5  
 lancets misc by Does Not Apply route daily. True Metrix lancets- test blood sugar once per day 30 Each 5  Blood-Glucose Meter monitoring kit Check up to tid, as directed 1 Kit 0  
 azelastine (ASTELIN) 137 mcg (0.1 %) nasal spray 1 Hackberry by Both Nostrils route daily as needed. Use in each nostril as directed  insulin detemir U-100 (LEVEMIR) 100 unit/mL injection 20 Units by SubCUTAneous route daily (with lunch).  montelukast (SINGULAIR) 10 mg tablet Take 10 mg by mouth nightly.  ascorbic acid, vitamin C, (VITAMIN C) 1,000 mg tablet Take 1,000 mg by mouth daily.  albuterol (PROVENTIL VENTOLIN) 2.5 mg /3 mL (0.083 %) nebulizer solution 2.5 mg by Nebulization route every four (4) hours as needed for Wheezing.  Liraglutide (VICTOZA) 0.6 mg/0.1 mL (18 mg/3 mL) pnij 1.8 mg by SubCUTAneous route daily (with lunch). 3 Pen 3  
 SYMBICORT 160-4.5 mcg/actuation HFAA Take 2 Puffs by inhalation two (2) times daily as needed. 2 Inhaler 3  clonazePAM (KLONOPIN) 0.5 mg tablet Take 0.5 mg by mouth nightly as needed.  sertraline (ZOLOFT) 50 mg tablet TAKE 1 TABLET BY MOUTH as needed  1  cholecalciferol, VITAMIN D3, (VITAMIN D3) 5,000 unit tab tablet Take 5,000 Units by mouth daily.  furosemide (LASIX) 40 mg tablet Take 40 mg by mouth every other day. 3 No current facility-administered medications on file prior to encounter. Allergies: (reviewed) Allergies Allergen Reactions  Carboplatin Other (comments) Patient developed shortness of breath, felt faint, coughing, skin flushed and \"itchy\". This occurred on the patients 8th treatment.  Iodinated Contrast Media Rash 13 hr pre-medication prior to IV contrast.  
Patient has done well with 1 hr pre-medication of (Solu-Medrol) 40mg &  (Benadryl) 50mg.  Lipitor [Atorvastatin] Myalgia  Shellfish Containing Products Hives  Tape [Adhesive] Itching and Other (comments) \"op site-- clear,thin tape\"--caused blister  Tomato Hives  Iodine And Iodide Containing Products Rash  Olmesartan Other (comments)  Losartan Other (comments)  
  headaches  Percocet [Oxycodone-Acetaminophen] Other (comments) Fever; however, current home med OBJECTIVE Physical Exam 
Visit Vitals /79 (BP 1 Location: Right arm, BP Patient Position: Sitting) Pulse 95 Temp 97.5 °F (36.4 °C) Resp 18 Ht 5' 8\" (1.727 m) Wt 251 lb 6.4 oz (114 kg) LMP 09/07/2011 BMI 38.23 kg/m² GENERAL BRIAN: Conversant, pleasant, alert, oriented. NAD No skin rash noted over right shoulder blade though areas of healing excoriation noted. HEENT: Oropharynx clear. Sclera anicteric. No JVD. No adenopathy RESPIRATORY: Lung CTA, No rales/rhonchi. Adequate respiratory effort. CARDIOVASC: Reg rate/rhythm, stable low grade murmur noted. Janet Bulls GASTROINT: soft, non-tender, without masses or organomegaly EXTREMITIES: bilat 1+ edema/girth nonpitting ALCON SURVEY: Negative NEURO: Grossly intact, no acute deficit. Oriented. Not depressed. Not agitated. Wt Readings from Last 3 Encounters:  
09/03/20 251 lb 6.4 oz (114 kg) 08/20/20 244 lb 6.4 oz (110.9 kg) 08/13/20 242 lb 12.8 oz (110.1 kg) DATE REVIEW as available: 
Lab Results Component Value Date/Time WBC 4.0 09/03/2020 08:10 AM  
 Hemoglobin (POC) 10.5 (L) 03/20/2017 12:15 PM  
 HGB 6.8 (L) 09/03/2020 08:10 AM  
 Hematocrit (POC) 31 (L) 03/20/2017 12:15 PM  
 HCT 22.4 (L) 09/03/2020 08:10 AM  
 PLATELET 166 42/84/3311 08:10 AM  
 MCV 98.2 09/03/2020 08:10 AM  
 
Lab Results Component Value Date/Time  
 ABS. NEUTROPHILS 2.2 09/03/2020 08:10 AM  
 
Lab Results Component Value Date/Time  Sodium 142 09/03/2020 08:10 AM  
 Potassium 4.5 09/03/2020 08:10 AM  
 Chloride 112 (H) 09/03/2020 08:10 AM  
 CO2 24 09/03/2020 08:10 AM  
 Anion gap 6 09/03/2020 08:10 AM  
 Glucose 110 (H) 09/03/2020 08:10 AM  
 BUN 32 (H) 09/03/2020 08:10 AM  
 Creatinine 1.46 (H) 09/03/2020 08:10 AM  
 BUN/Creatinine ratio 22 (H) 09/03/2020 08:10 AM  
 GFR est AA 44 (L) 09/03/2020 08:10 AM  
 GFR est non-AA 37 (L) 09/03/2020 08:10 AM  
 Calcium 9.1 09/03/2020 08:10 AM  
 Bilirubin, total 0.4 09/03/2020 08:10 AM  
 Alk. phosphatase 90 09/03/2020 08:10 AM  
 Protein, total 8.0 09/03/2020 08:10 AM  
 Albumin 3.2 (L) 09/03/2020 08:10 AM  
 Globulin 4.8 (H) 09/03/2020 08:10 AM  
 A-G Ratio 0.7 (L) 09/03/2020 08:10 AM  
 ALT (SGPT) 20 09/03/2020 08:10 AM  
 
CA-125 Date Value Ref Range Status 06/11/2020 268 (H) 1.5 - 35.0 U/mL Final  
  Comment:  
  ** Note new reference range and method ** Results may vary depending on . Method used is haku 
  
05/14/2020 233 (H) 1.5 - 35.0 U/mL Final  
  Comment:  
  ** Note new reference range and method ** Results may vary depending on . Method used is haku 
  
03/19/2020 226 (H) 1.5 - 35.0 U/mL Final  
  Comment:  
  ** Note new reference range and method ** Results may vary depending on . Method used is haku 
  
02/20/2020 220 (H) 1.5 - 35.0 U/mL Final  
  Comment:  
  ** Note new reference range and method ** Results may vary depending on . Method used is haku 
  
01/23/2020 200 (H) 1.5 - 35.0 U/mL Final  
  Comment:  
  ** Note new reference range and method ** Results may vary depending on . Method used is haku CT Results (most recent): 
Results from [facility] [place] Encounter encounter on 06/24/20 CT ABD PELV W CONT Narrative EXAM: CT CHEST W CONT, CT ABD PELV W CONT INDICATION: Ovarian cancer COMPARISON: 10/2/2019 CONTRAST: 100 mL of Isovue-370.  
 
TECHNIQUE:  
Following the uneventful intravenous administration of contrast, thin axial 
 images were obtained through the chest, abdomen and pelvis. Coronal and sagittal 
reconstructions were generated. Oral contrast was administered. CT dose 
reduction was achieved through use of a standardized protocol tailored for this 
examination and automatic exposure control for dose modulation. FINDINGS:  
 
THYROID: No nodule. MEDIASTINUM: Left paratracheal adenopathy now measures 3.7 x 2.0 cm increased in 
size. Mass in AP window which extends superiorly into the left infraclavicular 
region and inferiorly into the left hilum is also increase in size. When 
measured in a similar fashion this measures 3.5 x 2.4 previously 3.0 x 2.9 cm. Right parasternal mass is decreased in size measuring 3.4 x 3.4 cm. Subcarinal 
adenopathy measures 2.8 x 2.3 cm increased in size. There is increased size of 
the nodular component adjacent to the xiphoid now measuring 2.6 x 3.3 cm. SWATI: Left hilar lymph node measures 3.2 x 2.4 cm previously 2.5 x 1.8 cm. THORACIC AORTA: No dissection or aneurysm. MAIN PULMONARY ARTERY: Normal in caliber. TRACHEA/BRONCHI: Patent. ESOPHAGUS: No wall thickening or dilatation. HEART: Normal in size. PLEURA: No effusion or pneumothorax. LUNGS: No nodule, mass, or airspace disease. LIVER: Multiple masses are noted and better visualized on current study 
secondary to the addition of intravenous contrast. Comparison lesions measure 
previously, there is a 3.5 x 2.8 cm mass in the right hepatic lobe series 3 
image 58 slightly increased in size. There is a 3.6 x 2.9 cm mass in the left 
hepatic lobe slightly decreased in size series 3 image 56. Left hepatic lobe 
mass series 3 image 45 measuring 4.0 x 2.8 cm slightly increased in size. There 
is extensive tumor infiltration in the liver hilum with narrowing of the main 
portal vein as well as the intrahepatic portal veins.  The common hepatic artery 
is not well visualized and likely occluded with some collateral flow noted in 
 the liver hilum. GALLBLADDER: Not visualized SPLEEN: Mass in the splenic hilum increased in size measuring 7.4 x 4.3 cm with 
invasion into the splenic hilum. PANCREAS: The pancreas is encased by tumor. ADRENALS: There is likely involvement of the left adrenal gland tumor. KIDNEYS: Right nephroureteral stent with mild to moderate right-sided 
hydronephrosis. STOMACH: Tumor encasing the gastric antrum. SMALL BOWEL: No dilatation or wall thickening. COLON: Tumor abutting the tumor encasing the rectosigmoid junction. Tumor 
abutting the transverse colon. APPENDIX: Unremarkable. PERITONEUM: Some free fluid noted in the pelvis. Increased size of mesenteric 
masses. Representative examples: 7.8 x 4.3 cm tumor anterior peritoneum series 3 
image 84. No pelvic tumor 7.5 x 4.8 cm series 3 image 100. RETROPERITONEUM:  
Right pelvic sidewall increased in size. Large right lateral lymph node 
measuring 3.1 x 2.0 cm not significantly changed. REPRODUCTIVE ORGANS: The visualized URINARY BLADDER: No mass or calculus. BONES: No destructive bone lesion. ADDITIONAL COMMENTS: N/A Impression IMPRESSION: 
1. There is some variable response to therapy with several lesions decreased in 
size. However a majority of the lesions have increased in size consistent with 
progression of disease with involvement of the mediastinum, liver masses, and 
peritoneal carcinomatosis. 2.  Increase right-sided hydronephrosis. [facility] CT 10/2019: 
FINDINGS: 
THYROID: Visualized gland is unremarkable. MEDIASTINUM: Upper left paratracheal node measures 2.2 x 3.4 cm (2, 8), 
previously 2.3 x 3.5 cm. Superior prevascular node measures 2.9 x 3.0 cm, 
previously 3.5 x 3.8 cm. Poorly delineated subcarinal node measures about 3.4 x 
4.8 cm, previously 3.6 x 4.8 cm. Precardiac nodes measure 1.9 x 2.5 cm and 1.7 x 
2.4 cm (2, 39), previously 2.1 x 2.7 cm and 2.0 x 2.0 cm. SWATI: No mass or lymphadenopathy. THORACIC AORTA: No dissection or aneurysm. MAIN PULMONARY ARTERY: Normal in caliber. TRACHEA/BRONCHI: Patent. ESOPHAGUS: No wall thickening or dilatation. HEART: Normal in size. PLEURA: No effusion or pneumothorax. LUNGS: No nodule, mass, or airspace disease. LIVER: Multiple hypodense lesions suspicious for metastases, measuring up to 2.5 
x 4.0 cm in segment 2 (2, 43) series, previously 2.8 x 3.7 cm, 3.2 x 4.6 cm in 
segment 3 (2, 55), previously 3.1 x 4.5 cm, and partially exophytic from the 
posterior inferior right lobe segment 6 measuring 2.5 x 3.5 cm, previously 2.8 x 
3.4 cm. GALLBLADDER: Unremarkable. SPLEEN: Central 1.9 x 3.1 cm hypodensity (2, 47), previously 1.9 x 2.3 cm. Otherwise unremarkable. PANCREAS: Inseparable from bulky central abdominal adenopathy with no identified 
ductal dilation. ADRENALS: Unremarkable. KIDNEYS: Right hydronephrosis has improved status post right nephroureteral 
stent placement. Kidneys otherwise appear unremarkable. STOMACH: Unremarkable. SMALL BOWEL: No dilatation or wall thickening. COLON: No dilation or wall thickening. APPENDIX: Unremarkable. PERITONEUM: Extensive peritoneal implants with representative largest lesions 
measuring 4.4 x 6.1 cm and the left adnexal region (2, 98), previously 4.3 x 5.7 
cm, 3.8 x 5.9 cm in the anterior midline (2, 81), previously 3.4 x 5.4 cm, and 
2.8 x 4.7 cm in the right abdomen (2, 78), previously 3.2 x 4.7 cm. RETROPERITONEUM: Extensive rocio hepatis, retroperitoneal, and pelvic sidewall 
adenopathy. Conglomerate nkechi mass in the rocio hepatis region measures 5.7 x 
11.1 cm (2, 58) is, previously 6.3 x 12.2 cm. Aortocaval node measures 3.7 x 5.3 
cm (2, 77), previously 4.5 x 5.4 cm. Right pelvic sidewall node measures 2.9 x 
4.2 cm (2, 103), previously 3.4 x 4.2 cm. Left external iliac node measures 3.0 
x 4.8 cm (2, 101), previously 3.3 x 4.8 cm. REPRODUCTIVE ORGANS: Uterus and ovaries are surgically absent. URINARY BLADDER: No mass or calculus. Distal end of right nephroureteral stent 
positioned within bladder lumen. BONES: Degenerative spine change. No acute fracture or aggressive lesion. ADDITIONAL COMMENTS: Mass centered at the right sternocostal junction measures 5.3 x 5.4 cm (2, 21), previously 4.8 x 5.7 cm. Post surgical changes in the 
anterior abdominal wall. Left Port-A-Cath in place. Bilateral inguinal 
adenopathy measuring 2.1 x 3.2 cm on the right (2, 115), previously 2.3 x 3.7 cm 
and 2.7 x 2.8 cm on the left (2, 108), previously 2.9 x 3.1 cm. . 
  
IMPRESSION: Overall response to disease with either decrease in size or no 
significant change in extensive nkechi metastases, hepatic metastases, right 
sternal metastasis, and peritoneal carcinomatosis as above. No new metastasis or 
progressive metastasis identified. Status post right nephroureteral stent with 
interval resolution of right hydronephrosis. ECHO 1/2020 · Normal cavity size and systolic function (ejection fraction normal). Mild concentric hypertrophy. Estimated left ventricular ejection fraction is 55 - 60%. Visually measured ejection fraction. No regional wall motion abnormality noted. · Mildly dilated left atrium. · Mild aortic valve sclerosis. · Trace mitral valve regurgitation is present. IMPRESSION AND PLAN: 
62 y.o. with diffusely metastatic ovarian cancer. Continues on palliative chemo, retrial taxol Proceed with Cycle 2, Day 1. Tolerated previous cycles with little adverse effects. CKD stage 2-3 - stable. Right hydronephrosis - stent in place. Exchange Oct 
Liver mets -  Monitor liver studies. Normal today. Chronic pain - d/t stent/disease. Norco functional for now. Stool softeners. Stable on miralax Anemia - persistent d/t multiple causes, chemo hx. Hgb 6.8 today and symptomatic. Complains of fatigue, dizziness. Will transfuse 2 units today. SOB/debility - hx Asthma, controlled on meds. Consider stimulants PRN. Consider home bed d/t stairs. Maintaining nutrition at this time. Feels that her appetite is maintained. IDDM2/SHABANA/depression - controlled overall. Continue CPAP 
CIPN/diabetic polyneuropathy - not in pain, monitor. Being followed by podiatrist who has ordered further nerve conduction studies. Quinnreanna Alva Psychosocial - She is well supported, isaac remains very important. Monitor depressive sx mgmt. Is doing well today with no current complaints. Patient Active Problem List  
Diagnosis Code  Carcinomatosis ([facility] C80.0  Controlled type 2 diabetes mellitus without complication, with long-term current use of insulin (HCC) E11.9, Z79.4  Morbid obesity ([facility]) E66.01  
 Abnormal mammogram R92.8  SHABANA on CPAP G47.33, Z99.89  Dyslipidemia (high LDL; low HDL) E78.5  Port-A-Cath in place Z95.828  
 Ovarian cancer ([facility] C56.9  Hyperglycemia due to type 2 diabetes mellitus ([facility] E11.65  Renal calculi N20.0  Pulmonary hypertension, mild (HCC) I27.20  Lymphadenopathy, mediastinal R59.0  Type 2 diabetes mellitus with nephropathy (HCC) E11.21  
 On antineoplastic chemotherapy Z79.899  Reactive depression F32.9  Contrast media allergy Z91.041  
 Diabetes ([facility] E11.9  Hypothyroidism E03.9  Anemia D64.9  Hypertension I10  
 Pain due to malignant neoplasm metastatic to bone (HCC) G89.3, C79.51  
 Mass of chest wall R22.2  
 S/P ureteral stent placement Z96.0  Anemia associated with chemotherapy D64.81, T45.1X5A  Lymphedema of both lower extremities I89.0 Liz Gonzalez PA-C 
9/3/2020

## 2020-09-10 NOTE — PROGRESS NOTES
[facility] - Chemotherapy Progress Note    0800 Pt admit to [facility] for Taxol ambulatory in stable condition. Assessment completed. No new concerns voiced. Port accessed with positive blood return. Labs drawn and sent for processing.  IV attached to NS at [address] 9/10/2020   Cycle C2 D8   Date 9/10/2020   Drug / Regimen Taxol   Dosage -   Pre Hydration -   Pre Meds given   Notes -       Visit Vitals  /82   Pulse 81   Temp 97.3 °F (36.3 °C)   Resp 18   Ht 5' 8\" (1.727 m)   Wt 116.4 kg (256 lb 9.6 oz)   LMP 09/07/2011   Breastfeeding No   BMI 39.02 kg/m²       Medications:  Medications Administered     0.9% sodium chloride infusion     Admin Date  09/10/2020 Action  New Bag Dose  25 mL/hr Rate  25 mL/hr Route  IntraVENous Administered By  Terry Valladares RN          0.9% sodium chloride injection 10 mL     Admin Date  09/10/2020 Action  Given Dose  10 mL Route  IntraVENous Administered By  Terry Valladares RN          dexamethasone (DECADRON) 12 mg in 0.9% sodium chloride 50 mL, overfill volume 5 mL IVPB     Admin Date  09/10/2020 Action  Given Dose  12 mg Rate  232 mL/hr Route  IntraVENous Administered By  Terry Valladares RN          diphenhydrAMINE (BENADRYL) injection 50 mg     Admin Date  09/10/2020 Action  Given Dose  50 mg Route  IntraVENous Administered By  Terry Valladares RN          famotidine (PF) (PEPCID) 20 mg in 0.9% sodium chloride 10 mL injection     Admin Date  09/10/2020 Action  Given Dose  20 mg Route  IntraVENous Administered By  Terry Valladares RN          PACLitaxeL (TAXOL) 197 mg in 0.9% sodium chloride 250 mL, overfill volume 25 mL chemo infusion     Admin Date  09/10/2020 Action  New Bag Dose  197 mg Rate  307.8 mL/hr Route  IntraVENous Administered By  Terry Valladares RN          palonosetron HCl (ALOXI) injection 0.25 mg     Admin Date  09/10/2020 Action  Given Dose  0.25 mg Route  IntraVENous Administered By  Terry Valladares RN          saline peripheral flush soln 10 mL     Admin Date  09/10/2020 Action  Given Dose  10 mL Route  InterCATHeter Administered By  Minnie Barrios, RN                1150 Pt tolerated treatment well. Port maintained positive blood return throughout treatment, flushed with positive blood return at conclusion. D/c home ambulatory in no distress. Pt aware of next appointment scheduled for 9/17/20. Recent Results (from the past 24 hour(s))   METABOLIC PANEL, BASIC    Collection Time: 09/10/20  8:17 AM   Result Value Ref Range    Sodium 141 136 - 145 mmol/L    Potassium 4.4 3.5 - 5.1 mmol/L    Chloride 111 (H) 97 - 108 mmol/L    CO2 22 21 - 32 mmol/L    Anion gap 8 5 - 15 mmol/L    Glucose 122 (H) 65 - 100 mg/dL    BUN 37 (H) 6 - 20 MG/DL    Creatinine 1.55 (H) 0.55 - 1.02 MG/DL    BUN/Creatinine ratio 24 (H) 12 - 20      GFR est AA 41 (L) >60 ml/min/1.73m2    GFR est non-AA 34 (L) >60 ml/min/1.73m2    Calcium 8.9 8.5 - 10.1 MG/DL   CBC WITH AUTOMATED DIFF    Collection Time: 09/10/20  8:17 AM   Result Value Ref Range    WBC 4.3 3.6 - 11.0 K/uL    RBC 2.51 (L) 3.80 - 5.20 M/uL    HGB 7.5 (L) 11.5 - 16.0 g/dL    HCT 24.6 (L) 35.0 - 47.0 %    MCV 98.0 80.0 - 99.0 FL    MCH 29.9 26.0 - 34.0 PG    MCHC 30.5 30.0 - 36.5 g/dL    RDW 14.8 (H) 11.5 - 14.5 %    PLATELET 251 398 - 652 K/uL    MPV 9.5 8.9 - 12.9 FL    NRBC 0.0 0  WBC    ABSOLUTE NRBC 0.00 0.00 - 0.01 K/uL    NEUTROPHILS 47 32 - 75 %    LYMPHOCYTES 32 12 - 49 %    MONOCYTES 13 5 - 13 %    EOSINOPHILS 5 0 - 7 %    BASOPHILS 1 0 - 1 %    IMMATURE GRANULOCYTES 2 (H) 0.0 - 0.5 %    ABS. NEUTROPHILS 2.0 1.8 - 8.0 K/UL    ABS. LYMPHOCYTES 1.4 0.8 - 3.5 K/UL    ABS. MONOCYTES 0.5 0.0 - 1.0 K/UL    ABS. EOSINOPHILS 0.2 0.0 - 0.4 K/UL    ABS. BASOPHILS 0.0 0.0 - 0.1 K/UL    ABS. IMM.  GRANS. 0.1 (H) 0.00 - 0.04 K/UL    DF AUTOMATED

## 2020-09-17 NOTE — PROGRESS NOTES
[address] [facility] [address]  P [phone]  F [phone]    Office Visit    Patient ID:  Name: Luke Red  MRN: [id number]  : 1960/60 y.o. Visit date: 2020        LEONID Fernandes is a 62 y.o.  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 The patient's previous treatment include primary debulking and adjuvant Taxol/carbo with retreatment in  for recurrence. Subsequent lines of chemo as outlined below. She has most recently started single agent weekly Taxol on 18 following progression with chest wall involvement while on PARPi. Most recent EOD imaging in 2020 shows a variable response to her prior therapy though with mostly increased disease including liver, mediastium and abd with progressive right hydronephrosis. She is s/p stent placement.        OncTx History:  12: MARAH/BSO/Cytoreductive surgery on 12 noted carcinomatosis/omental caking. 10/2012-2013: 6 cycles Carbo/Taxol  2014-2014: 6 Cycles carbo/Taxol  3/2015-5/13/15: 3 cycles Avastin/Doxil until progression  2015-2015 Weekly Topotecan   2015-3/2016: Weekly Topotecan  2017-2017:  Gemzar D1/D8 Q28 days for 6 cycles  2018: CT core needle bx chest wall mass: Metastatic high-grade serous carcinoma (see comment) Positive for malignancy. 2018-2018: Taxol weekly   2019: 10 fx 30cGy palliative RT to sternal lesion  2019 - 2020 [facility]. Underwent right ureteral stent placement on 3/18/19. CT C/A/P 2020 with mixed response, but overall progression. Rising Ca-125 now 268.  2020: CT CAP variable response to therapy with several lesions decreased in size. However a majority of the lesions have increased in size consistent with progression of disease with involvement of the mediastinum, liver masses, and peritoneal carcinomatosis.  Increase right-sided hydronephrosis. .  8/2020 - initiated Taxol weekly      CT Results (most recent):  Results from Hospital Encounter encounter on 06/24/20   CT ABD PELV W CONT    Narrative EXAM: CT CHEST W CONT, CT ABD PELV W CONT    INDICATION: Ovarian cancer    COMPARISON: 10/2/2019    CONTRAST: 100 mL of Isovue-370. TECHNIQUE:   Following the uneventful intravenous administration of contrast, thin axial  images were obtained through the chest, abdomen and pelvis. Coronal and sagittal  reconstructions were generated. Oral contrast was administered. CT dose  reduction was achieved through use of a standardized protocol tailored for this  examination and automatic exposure control for dose modulation. FINDINGS:     THYROID: No nodule. MEDIASTINUM: Left paratracheal adenopathy now measures 3.7 x 2.0 cm increased in  size. Mass in AP window which extends superiorly into the left infraclavicular  region and inferiorly into the left hilum is also increase in size. When  measured in a similar fashion this measures 3.5 x 2.4 previously 3.0 x 2.9 cm. Right parasternal mass is decreased in size measuring 3.4 x 3.4 cm. Subcarinal  adenopathy measures 2.8 x 2.3 cm increased in size. There is increased size of  the nodular component adjacent to the xiphoid now measuring 2.6 x 3.3 cm. SWATI: Left hilar lymph node measures 3.2 x 2.4 cm previously 2.5 x 1.8 cm. THORACIC AORTA: No dissection or aneurysm. MAIN PULMONARY ARTERY: Normal in caliber. TRACHEA/BRONCHI: Patent. ESOPHAGUS: No wall thickening or dilatation. HEART: Normal in size. PLEURA: No effusion or pneumothorax. LUNGS: No nodule, mass, or airspace disease. LIVER: Multiple masses are noted and better visualized on current study  secondary to the addition of intravenous contrast. Comparison lesions measure  previously, there is a 3.5 x 2.8 cm mass in the right hepatic lobe series 3  image 58 slightly increased in size.  There is a 3.6 x 2.9 cm mass in the left  hepatic lobe slightly decreased in size series 3 image 56. Left hepatic lobe  mass series 3 image 45 measuring 4.0 x 2.8 cm slightly increased in size. There  is extensive tumor infiltration in the liver hilum with narrowing of the main  portal vein as well as the intrahepatic portal veins. The common hepatic artery  is not well visualized and likely occluded with some collateral flow noted in  the liver hilum. GALLBLADDER: Not visualized  SPLEEN: Mass in the splenic hilum increased in size measuring 7.4 x 4.3 cm with  invasion into the splenic hilum. PANCREAS: The pancreas is encased by tumor. ADRENALS: There is likely involvement of the left adrenal gland tumor. KIDNEYS: Right nephroureteral stent with mild to moderate right-sided  hydronephrosis. STOMACH: Tumor encasing the gastric antrum. SMALL BOWEL: No dilatation or wall thickening. COLON: Tumor abutting the tumor encasing the rectosigmoid junction. Tumor  abutting the transverse colon. APPENDIX: Unremarkable. PERITONEUM: Some free fluid noted in the pelvis. Increased size of mesenteric  masses. Representative examples: 7.8 x 4.3 cm tumor anterior peritoneum series 3  image 84. No pelvic tumor 7.5 x 4.8 cm series 3 image 100. RETROPERITONEUM:   Right pelvic sidewall increased in size. Large right lateral lymph node  measuring 3.1 x 2.0 cm not significantly changed. REPRODUCTIVE ORGANS: The visualized  URINARY BLADDER: No mass or calculus. BONES: No destructive bone lesion. ADDITIONAL COMMENTS: N/A      Impression IMPRESSION:  1. There is some variable response to therapy with several lesions decreased in  size. However a majority of the lesions have increased in size consistent with  progression of disease with involvement of the mediastinum, liver masses, and  peritoneal carcinomatosis. 2.  Increase right-sided hydronephrosis. .           CT 10/2019:  FINDINGS:  THYROID: Visualized gland is unremarkable.   MEDIASTINUM: Upper left paratracheal node measures 2.2 x 3.4 cm (2, 8),  previously 2.3 x 3.5 cm. Superior prevascular node measures 2.9 x 3.0 cm,  previously 3.5 x 3.8 cm. Poorly delineated subcarinal node measures about 3.4 x  4.8 cm, previously 3.6 x 4.8 cm. Precardiac nodes measure 1.9 x 2.5 cm and 1.7 x  2.4 cm (2, 39), previously 2.1 x 2.7 cm and 2.0 x 2.0 cm. SWATI: No mass or lymphadenopathy. THORACIC AORTA: No dissection or aneurysm. MAIN PULMONARY ARTERY: Normal in caliber. TRACHEA/BRONCHI: Patent. ESOPHAGUS: No wall thickening or dilatation. HEART: Normal in size. PLEURA: No effusion or pneumothorax. LUNGS: No nodule, mass, or airspace disease. LIVER: Multiple hypodense lesions suspicious for metastases, measuring up to 2.5  x 4.0 cm in segment 2 (2, 43) series, previously 2.8 x 3.7 cm, 3.2 x 4.6 cm in  segment 3 (2, 55), previously 3.1 x 4.5 cm, and partially exophytic from the  posterior inferior right lobe segment 6 measuring 2.5 x 3.5 cm, previously 2.8 x  3.4 cm. GALLBLADDER: Unremarkable. SPLEEN: Central 1.9 x 3.1 cm hypodensity (2, 47), previously 1.9 x 2.3 cm. Otherwise unremarkable. PANCREAS: Inseparable from bulky central abdominal adenopathy with no identified  ductal dilation. ADRENALS: Unremarkable. KIDNEYS: Right hydronephrosis has improved status post right nephroureteral  stent placement. Kidneys otherwise appear unremarkable. STOMACH: Unremarkable. SMALL BOWEL: No dilatation or wall thickening. COLON: No dilation or wall thickening. APPENDIX: Unremarkable. PERITONEUM: Extensive peritoneal implants with representative largest lesions  measuring 4.4 x 6.1 cm and the left adnexal region (2, 98), previously 4.3 x 5.7  cm, 3.8 x 5.9 cm in the anterior midline (2, 81), previously 3.4 x 5.4 cm, and  2.8 x 4.7 cm in the right abdomen (2, 78), previously 3.2 x 4.7 cm. RETROPERITONEUM: Extensive rocio hepatis, retroperitoneal, and pelvic sidewall  adenopathy.  Conglomerate nkechi mass in the rocio hepatis region measures 5.7 x  11.1 cm (2, 58) is, previously 6.3 x 12.2 cm. Aortocaval node measures 3.7 x 5.3  cm (2, 77), previously 4.5 x 5.4 cm. Right pelvic sidewall node measures 2.9 x  4.2 cm (2, 103), previously 3.4 x 4.2 cm. Left external iliac node measures 3.0  x 4.8 cm (2, 101), previously 3.3 x 4.8 cm. REPRODUCTIVE ORGANS: Uterus and ovaries are surgically absent. URINARY BLADDER: No mass or calculus. Distal end of right nephroureteral stent  positioned within bladder lumen. BONES: Degenerative spine change. No acute fracture or aggressive lesion. ADDITIONAL COMMENTS: Mass centered at the right sternocostal junction measures  5.3 x 5.4 cm (2, 21), previously 4.8 x 5.7 cm. Post surgical changes in the  anterior abdominal wall. Left Port-A-Cath in place. Bilateral inguinal  adenopathy measuring 2.1 x 3.2 cm on the right (2, 115), previously 2.3 x 3.7 cm  and 2.7 x 2.8 cm on the left (2, 108), previously 2.9 x 3.1 cm. .     IMPRESSION: Overall response to disease with either decrease in size or no  significant change in extensive nkechi metastases, hepatic metastases, right  sternal metastasis, and peritoneal carcinomatosis as above. No new metastasis or  progressive metastasis identified. Status post right nephroureteral stent with  interval resolution of right hydronephrosis. SUBJECTIVE:  Presents to [facility]. Doing better this weak. Less fatigue/SOB s/p transfusion. Down to one Norco/day using at bedtime. No N/V, appetite improved. Drinks 4bottles water/day with gatorade. +BM daily to every other day with miralax. Discomforts mostly right flank related to her ureteral stent that has caused multiple issues for her. Seen by her urologist, plans for stent change in Oct.   She remains mostly independent, OOB most of day. She reports completes chores and all self care, though gets winded at times, most severely going up to her second-floor room by 14stairs.  Stair lift is too expensive, thinking of adding on to home. Two adult sons at home who cook for her. She is also fostering to boys 13 and 12. The older is moving out in October to reunite with his mother. She is planning to adopt the younger. Confirms if she passes, her son Satish will adopt him. She has discussed this with the adoption agency. She has chronic pedal/hand numbness/tingling due to taxol and DM. Denies pain. Wakes from sleep occasionally d/t this. Resumes sleep easily. Follows with podiatrist. Denies any change in depression, continues medication. ROS  10 pt negative except as per above    PMH:  Past Medical History:   Diagnosis Date    Adverse effect of anesthesia     sleep apnea cpap machine useage     Anemia     Arthritis     DDD low back and knees    Asthma     uses albuterol prn only; none x 6 mos.   Never hospitalized    BRCA negative 1/2013    Calculus of kidney     Chronic kidney disease     kidney stones    Chronic pain     knee pain bilat    CINV (chemotherapy-induced nausea and vomiting) 8/21/2014    Decreased hearing, right     PATIENT STATES THIS IS DUE TO CHEMOTHERAPY    Diabetes ([address] Age 46    IDDM    Environmental allergies     Fibromyalgia     GERD (gastroesophageal reflux disease)     controlled with med    Hx of pulmonary embolus during pregnancy 1/18/2015    Hydronephrosis due to obstruction of ureter 3/18/2019    Hypertension     Ill-defined condition     Sepsis 9-2015 -  SOURCE PORT    Ill-defined condition 2016    chemotherapy     Mass of chest wall 05/2018    Morbid obesity (HCC)     Murmur, heart     Other and unspecified hyperlipidemia     Ovarian cancer ([address] 9/2012, 1/2014    serous, high grade, stage IIIc. s/p chemotx (has port)    Psychiatric disorder     Depression/Anxiety    Rectal bleeding     Thromboembolus ([address] 1993    POST PARTUM; resolved- PELVIS    Thyroid disease     HYPOTHYROID    Unspecified sleep apnea     CPAP, Dr. Terry Roque     PSH:  Past Surgical History: Procedure Laterality Date    BREAST SURGERY PROCEDURE UNLISTED      Lymph node in left breast 2017   [place]     X2    HX HYSTERECTOMY  2012    ROULA/BSO, tumor debulking, omentectomy for ovarian cancer    HX ORTHOPAEDIC Right     ankle-FX, R    HX ORTHOPAEDIC Right     FX R ARM    HX UROLOGICAL Right     STENT FOR KIDNEY STONES    HX VASCULAR ACCESS  2015    right chest- port placed    HX VASCULAR ACCESS Left 2017    TETE CATH     SOC:  Social History     Socioeconomic History    Marital status:      Spouse name: Not on file    Number of children: Not on file    Years of education: Not on file    Highest education level: Not on file   Occupational History    Not on file   Social Needs    Financial resource strain: Not on file    Food insecurity     Worry: Not on file     Inability: Not on file    Transportation needs     Medical: Not on file     Non-medical: Not on file   Tobacco Use    Smoking status: Never Smoker    Smokeless tobacco: Never Used   Substance and Sexual Activity    Alcohol use: Yes     Comment: 1 drink per month    Drug use: No    Sexual activity: Yes   Lifestyle    Physical activity     Days per week: Not on file     Minutes per session: Not on file    Stress: Not on file   Relationships    Social connections     Talks on phone: Not on file     Gets together: Not on file     Attends Orthodox service: Not on file     Active member of club or organization: Not on file     Attends meetings of clubs or organizations: Not on file     Relationship status: Not on file    Intimate partner violence     Fear of current or ex partner: Not on file     Emotionally abused: Not on file     Physically abused: Not on file     Forced sexual activity: Not on file   Other Topics Concern    Not on file   Social History Narrative    Lives in [place] alone.  passed away in  of a heart attack. Has 2 sons. Disability.   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 Enjoys swimming and going to the gym. Family History  Family History   Problem Relation Age of Onset    Hypertension Mother     Arthritis-osteo Mother     Hypertension Father     Arthritis-osteo Father     Cancer Father         PROSTATE    COPD Father     Hypertension Brother     Elevated Lipids Brother     Arthritis-osteo Brother     Hypertension Brother     Elevated Lipids Brother     Obesity Brother     Cancer Brother         PROSTATE    Anesth Problems Son         DELAYED AWAKENING    Diabetes Maternal Grandmother     Anesth Problems Paternal Grandmother         PATIENT [address], [address] STOPPED DURING SURGERY     Medications: (reviewed)  Current Outpatient Medications on File Prior to Encounter   Medication Sig Dispense Refill    amoxicillin-clavulanate (AUGMENTIN) 875-125 mg per tablet       doxazosin (CARDURA) 1 mg tablet Take 1 mg by mouth daily.  SULFAMETHOXAZOLE-TRIMETHOPRIM PO Take  by mouth two (2) times a day. 7 days      polyethylene glycol (MIRALAX) 17 gram packet Take 1 Packet by mouth daily for 60 days. To prevent constipation with pain medication or any cause 60 Packet 0    dexAMETHasone (DECADRON) 4 mg tablet Take 2 tablets with breakfast the day before chemo and for 2 days after chemo 36 Tab 2    lidocaine-prilocaine (EMLA) topical cream Apply small amount over port area and cover with band aid one hour before chemo 30 g 3    ondansetron (ZOFRAN ODT) 4 mg disintegrating tablet Take 1 Tab by mouth every eight (8) hours as needed for Nausea. Indications: nausea and vomiting caused by cancer drugs 30 Tab 2    predniSONE (DELTASONE) 50 mg tablet Take 1 tablet 13 hours prior to scan, take 1 tablet 7 hours prior to scan and take 1 tablet one hour prior to scan 3 Tab 0    Zejula 100 mg cap Take 100 mg by mouth daily.  60 Cap 4    fluticasone propionate (FLONASE) 50 mcg/actuation nasal spray 2 SPRAYS IN BOTH NOSTRILS DAILY 1 Bottle 0    omega-3 acid ethyl esters (LOVAZA) 1 gram capsule TAKE 1 CAPSULE BY MOUTH TWICE A  Cap 2    cycloSPORINE (RESTASIS) 0.05 % dpet       albuterol (PROVENTIL VENTOLIN) 2.5 mg /3 mL (0.083 %) nebu Take 2.5 mg by inhalation.  dextroamphetamine-amphetamine (ADDERALL) 20 mg tablet TAKE 1 TABLET BY MOUTH EVERY DAY  0    rosuvastatin (CRESTOR) 10 mg tablet TAKE 1 TABLET BY MOUTH EVERY DAY  0    telmisartan (MICARDIS) 20 mg tablet TAKE 1 TABLET BY MOUTH EVERY DAY 90 Tab 3    atorvastatin (LIPITOR) 20 mg tablet       BD ULTRA-FINE SHORT PEN NEEDLE 31 gauge x 5/16\" ndle USE TO INJECT INSULIN 5 TIMES DAILY 200 Pen Needle 11    insulin aspart U-100 (NOVOLOG FLEXPEN U-100 INSULIN) 100 unit/mL (3 mL) inpn INJECT 20 UNITS BEFORE EACH MEAL + 4 UNITS FOR EVERY 50 MG/DL ABOVE 150 MG/DL.  UNITS PER DAY 90 Adjustable Dose Pre-filled Pen Syringe 3    LEVEMIR FLEXTOUCH U-100 INSULN 100 unit/mL (3 mL) inpn INJECT 30 UNITS IN AM AND 50 UNITS AT NIGHT. INCREASE AS DIRECTED TO  UNITS/DAY. 90 Adjustable Dose Pre-filled Pen Syringe 3    DEXILANT 60 mg CpDB capsule (delayed release) TAKE 1 CAPSULE BY MOUTH EVERY DAY 90 Cap 3    LINZESS 145 mcg cap capsule TAKE 1 CAPSULE BY MOUTH EVERY DAYY 30 Cap 0    levothyroxine (SYNTHROID) 150 mcg tablet TAKE 1 TABLET BY MOUTH EVERY DAY BEFORE BREAKFAST 90 Tab 0    EPINEPHrine (EPIPEN) 0.3 mg/0.3 mL injection INJECT 0.3 ML BY INTRAMUSCULAR ROUTE ONCE AS NEEDED FOR UP TO 1 DOSE.  1    glucose blood VI test strips (TRUE METRIX GLUCOSE TEST STRIP) strip by Does Not Apply route See Admin Instructions. 30 Strip 5    lancets misc by Does Not Apply route daily. True Metrix lancets- test blood sugar once per day 30 Each 5    Blood-Glucose Meter monitoring kit Check up to tid, as directed 1 Kit 0    azelastine (ASTELIN) 137 mcg (0.1 %) nasal spray 1 [place] by Both Nostrils route daily as needed.  Use in each nostril as directed       insulin detemir U-100 (LEVEMIR) 100 unit/mL injection 20 Units by SubCUTAneous route daily (with lunch).  montelukast (SINGULAIR) 10 mg tablet Take 10 mg by mouth nightly.  ascorbic acid, vitamin C, (VITAMIN C) 1,000 mg tablet Take 1,000 mg by mouth daily.  albuterol (PROVENTIL VENTOLIN) 2.5 mg /3 mL (0.083 %) nebulizer solution 2.5 mg by Nebulization route every four (4) hours as needed for Wheezing.  Liraglutide (VICTOZA) 0.6 mg/0.1 mL (18 mg/3 mL) pnij 1.8 mg by SubCUTAneous route daily (with lunch). 3 Pen 3    SYMBICORT 160-4.5 mcg/actuation HFAA Take 2 Puffs by inhalation two (2) times daily as needed. 2 Inhaler 3    clonazePAM (KLONOPIN) 0.5 mg tablet Take 0.5 mg by mouth nightly as needed.  sertraline (ZOLOFT) 50 mg tablet TAKE 1 TABLET BY MOUTH as needed  1    cholecalciferol, VITAMIN D3, (VITAMIN D3) 5,000 unit tab tablet Take 5,000 Units by mouth daily.  furosemide (LASIX) 40 mg tablet Take 40 mg by mouth every other day. 3     No current facility-administered medications on file prior to encounter. Allergies: (reviewed)  Allergies   Allergen Reactions    Carboplatin Other (comments)     Patient developed shortness of breath, felt faint, coughing, skin flushed and \"itchy\". This occurred on the patients 8th treatment.  Iodinated Contrast Media Rash     13 hr pre-medication prior to IV contrast.   Patient has done well with 1 hr pre-medication of (Solu-Medrol) 40mg &  (Benadryl) 50mg.     Lipitor [Atorvastatin] Myalgia    Shellfish Containing Products Hives    Tape [Adhesive] Itching and Other (comments)     \"op site-- clear,thin tape\"--caused blister     Tomato Hives    Iodine And Iodide Containing Products Rash    Olmesartan Other (comments)    Losartan Other (comments)     headaches    Percocet [Oxycodone-Acetaminophen] Other (comments)     Fever; however, current home med       OBJECTIVE  Physical Exam  Visit Vitals  LMP 09/07/2011       GENERAL BRIAN: Conversant, pleasant, alert, oriented. NAD   HEENT: Oropharynx clear. Sclera anicteric. No JVD. No adenopathy   RESPIRATORY: Lung CTA, No rales/rhonchi. Adequate respiratory effort. CARDIOVASC: Reg rate/rhythm, stable murmur low grade. No visible chest lesion. GASTROINT: soft, non-tender, without masses or organomegaly, no fluid wave. Habitus limits exam.   EXTREMITIES: bilat 1+ edema/girth nonpitting   ALCON SURVEY: Negative   NEURO: Grossly intact, no acute deficit. Oriented. Not depressed. Not agitated. Wt Readings from Last 3 Encounters:   09/10/20 256 lb 9.6 oz (116.4 kg)   09/03/20 251 lb 6.4 oz (114 kg)   08/20/20 244 lb 6.4 oz (110.9 kg)       DATE REVIEW as available:  Lab Results   Component Value Date/Time    WBC 4.3 09/10/2020 08:17 AM    Hemoglobin (POC) 10.5 (L) 03/20/2017 12:15 PM    HGB 7.5 (L) 09/10/2020 08:17 AM    Hematocrit (POC) 31 (L) 03/20/2017 12:15 PM    HCT 24.6 (L) 09/10/2020 08:17 AM    PLATELET 691 81/44/6015 08:17 AM    MCV 98.0 09/10/2020 08:17 AM     Lab Results   Component Value Date/Time    ABS. NEUTROPHILS 2.0 09/10/2020 08:17 AM     Lab Results   Component Value Date/Time    Sodium 141 09/10/2020 08:17 AM    Potassium 4.4 09/10/2020 08:17 AM    Chloride 111 (H) 09/10/2020 08:17 AM    CO2 22 09/10/2020 08:17 AM    Anion gap 8 09/10/2020 08:17 AM    Glucose 122 (H) 09/10/2020 08:17 AM    BUN 37 (H) 09/10/2020 08:17 AM    Creatinine 1.55 (H) 09/10/2020 08:17 AM    BUN/Creatinine ratio 24 (H) 09/10/2020 08:17 AM    GFR est AA 41 (L) 09/10/2020 08:17 AM    GFR est non-AA 34 (L) 09/10/2020 08:17 AM    Calcium 8.9 09/10/2020 08:17 AM    Bilirubin, total 0.4 09/03/2020 08:10 AM    Alk.  phosphatase 90 09/03/2020 08:10 AM    Protein, total 8.0 09/03/2020 08:10 AM    Albumin 3.2 (L) 09/03/2020 08:10 AM    Globulin 4.8 (H) 09/03/2020 08:10 AM    A-G Ratio 0.7 (L) 09/03/2020 08:10 AM    ALT (SGPT) 20 09/03/2020 08:10 AM     CA-125   Date Value Ref Range Status   09/03/2020 187 (H) 1.5 - 35.0 U/mL Final Comment:     ** Note new reference range and method **  Results may vary depending on . Method used is [employer]     06/11/2020 268 (H) 1.5 - 35.0 U/mL Final     Comment:     ** Note new reference range and method **  Results may vary depending on . Method used is [employer]     05/14/2020 233 (H) 1.5 - 35.0 U/mL Final     Comment:     ** Note new reference range and method **  Results may vary depending on . Method used is [employer]     03/19/2020 226 (H) 1.5 - 35.0 U/mL Final     Comment:     ** Note new reference range and method **  Results may vary depending on . Method used is [employer]     02/20/2020 220 (H) 1.5 - 35.0 U/mL Final     Comment:     ** Note new reference range and method **  Results may vary depending on . Method used is [employer]           ECHO 1/2020  · Normal cavity size and systolic function (ejection fraction normal). Mild concentric hypertrophy. Estimated left ventricular ejection fraction is 55 - 60%. Visually measured ejection fraction. No regional wall motion abnormality noted. · Mildly dilated left atrium. · Mild aortic valve sclerosis. · Trace mitral valve regurgitation is present. IMPRESSION AND PLAN:  62 y.o. with diffusely metastatic ovarian cancer. Continues on palliative chemo, retrial taxol    C1 ongoing. Tolerated previous cycle. CKD stage 2-3 - improved s/p transfusion. Continue home hydration  Right hydronephrosis - stent in place. Exchange Oct  Liver mets -  Monitor liver studies   Chronic pain - d/t stent/disease. Norco functional for now. Stool softeners  Anemia - persistent d/t multiple causes, chemo hx. Consider EPO  SOB/debility - hx Asthma, controlled on meds. Consider stimulants PRN. Consider home bed d/t stairs. IDDM2/SHABANA/depression - controlled overall. Continue CPAP  CIPN/diabetic polyneuropathy - not in pain, monitor. Discussed home safety. Wt loss, prot smiley malnutrition - supplements discussed  Psychosocial - she has some fear about the future. She is well supported, isaac remains very important. Monitor depressive sx mgmt. Discussed home life and plans for the future. Plans for adopting her foster child with support of her son Satish.       Ann Ching PA-C  9/17/2020

## 2020-09-17 NOTE — PROGRESS NOTES
[facility] - Chemotherapy Progress Note    0800 Pt admit to [facility] for Taxol ambulatory in stable condition. Assessment completed. No new concerns voiced. Port accessed with positive blood return. Labs drawn and sent for processing.  IV attached to NS at [facility] aware of HGB    Chemotherapy Flowsheet 9/17/2020   Cycle C2D15   Date 9/17/2020   Drug / Regimen Taxol   Dosage -   Pre Hydration -   Pre Meds given   Notes -       Visit Vitals  BP (!) 152/75   Pulse 95   Temp 97 °F (36.1 °C)   Resp 18   Ht 5' 8\" (1.727 m)   Wt 115.7 kg (255 lb)   LMP 09/07/2011   Breastfeeding No   BMI 38.77 kg/m²       Medication:  Medications Administered     0.9% sodium chloride infusion     Admin Date  09/17/2020 Action  New Bag Dose  25 mL/hr Rate  25 mL/hr Route  IntraVENous Administered By  Rosalie Swanson RN          0.9% sodium chloride injection 10 mL     Admin Date  09/17/2020 Action  Given Dose  10 mL Route  IntraVENous Administered By  Rosalie Swanson RN          dexamethasone (DECADRON) 12 mg in 0.9% sodium chloride 50 mL, overfill volume 5 mL IVPB     Admin Date  09/17/2020 Action  Given Dose  12 mg Rate  232 mL/hr Route  IntraVENous Administered By  Rosalie Swanson RN          diphenhydrAMINE (BENADRYL) injection 50 mg     Admin Date  09/17/2020 Action  Given Dose  50 mg Route  IntraVENous Administered By  Rosalie Swanson RN          famotidine (PF) (PEPCID) 20 mg in 0.9% sodium chloride 10 mL injection     Admin Date  09/17/2020 Action  Given Dose  20 mg Route  IntraVENous Administered By  Rosalie Swanson RN          heparin (porcine) pf 300-500 Units     Admin Date  09/17/2020 Action  Given Dose  500 Units Route  InterCATHeter Administered By  Rosalie Swanson RN          PACLitaxeL (TAXOL) 197 mg in 0.9% sodium chloride 250 mL, overfill volume 25 mL chemo infusion     Admin Date  09/17/2020 Action  New Bag Dose  197 mg Rate  307.8 mL/hr Route  IntraVENous Administered By  Marce Hairston, RN          palonosetron HCl (ALOXI) injection 0.25 mg     Admin Date  09/17/2020 Action  Given Dose  0.25 mg Route  IntraVENous Administered By  Marce Hairston RN          saline peripheral flush soln 10 mL     Admin Date  09/17/2020 Action  Given Dose  10 mL Route  InterCATHeter Administered By  Marce Hairston, RN                      [id number] Pt tolerated treatment well. Port maintained positive blood return throughout treatment, flushed with positive blood return at conclusion. D/c home ambulatory in no distress.  Pt aware of next appointment scheduled for   Future Appointments   Date Time Provider Gabriel Phillip   9/22/2020  2:00 PM MD ALMA RoldanO BS AMB   10/1/2020  8:00 AM A1 [facility] [address]   10/8/2020  8:00 AM A1 [facility] [address] H   10/15/2020  8:00 AM A1 [facility] [address]

## 2020-09-22 NOTE — PROGRESS NOTES
[facility], [address] [place] 
P [phone]  F [phone] Office Visit Patient ID: 
Name: Michael Castañeda MRN: [id number] : 1960/60 y.o. Visit date: 2020 LEONID Garrison is a 62 y.o.  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 The patient's previous treatment procedures include primary Taxol/carbo with retreatment in  and Doxil/Avastin, topotecan, gemzar and now single agent weekly Taxol initiated 18 following progression with chest wall involvement. In 2018, patient did not follow up and self-discontinued her treatment regimen. She presented to the ER on 18 with SOB. Negative workup for PE or MI. Admitted to Hospitalist service from -18. Managed for acute asthma exacerbation, acute FELICIA, and hyperkalemia. The patient was initiated on Mueller Ailyn on 19. Underwent repeat CT C/A/P on 19 for purposes of following response to treatment. Patient was tolerating Mueller Ailyn well for the first month, but then had a rise in her creatinine. However, she was also found to have hydronephrosis. Underwent right ureteral stent placement on 3/18/19. Restarted Zejula afterwards, although with continued rise in creatinine to >2. Held Mueller [place] again and restarted Zejula at 100mg/daily on 19. Presents today for follow-up and consideration of continuation of Mueller [place]. Completed 2700 cGy of radiation to her chest wall mass on 19. Tolerating treatment well. Held for one week in 10/2019 for blood transfusion secondary to anemia. Normal iron and B12 labs. Her Ca-125 has bounced around, but has been steadily declining recently. CT C/A/P on 10/2/19 with improvement in her disease. CT C/A/P 2020 demonstrates progression. Rising Ca-125. On 2020 initiated on weekly paclitaxel. Presents today for cycle 3. She has tolerated the first 2 cycles well. She denies pain in her chest wall or abdomen. She does have some constipation on the day of treatment, which is relieved with Miralax. Reports plan for ureteral stent exchange next month. She has not required any antibiotics recently. Denies change in appetite or bowel habits. Denies vaginal bleeding, hematuria, hematochezia, constipation, diarrhea, nausea, vomiting, CP, SOB, fevers, or chills. 
   
OncTx History: 
9/21/12: MARAH/BSO/Cytoreductive surgery on 9/21/12 noted carcinomatosis/omental caking. 10/2012-2/2013: 6 cycles Carbo/Taxol 8/2014-11/2014: 6 Cycles carbo/Taxol 3/2015-5/13/15: 3 cycles Avastin/Doxil until progression 7/2015-8/2015 Weekly Topotecan  
12/2015-3/2016: Weekly Topotecan 2/2017-9/2017Maurie Bolk D1/D8 Q28 days for 6 cycles 4/2018: CT core needle bx chest wall mass: Metastatic high-grade serous carcinoma (see comment) General Categorization Positive for malignancy. 5/8/2018-Present: Taxol  L4,2,17; S/P  Cycle #4  8/14/2018. Patient discontinued treatment secondary to side effects and fatigue. 1/23/2019 - Initiated Allyson Torres. Held do to rising creatinine. However, she was also found to have hydronephrosis. Underwent right ureteral stent placement on 3/18/19. CT C/A/P 6/24/2020 with mixed response, but overall progression. Rising Ca-125 now 268. 
8/6/2020: Initiated weekly paclitaxel 80mg/m2 days , 8, and 15 of 28-day cycle. Imaging Review:  
CT C/A/P 6/24/2020: 
FINDINGS:  
THYROID: No nodule. MEDIASTINUM: Left paratracheal adenopathy now measures 3.7 x 2.0 cm increased in 
size. Mass in AP window which extends superiorly into the left infraclavicular 
region and inferiorly into the left hilum is also increase in size. When 
measured in a similar fashion this measures 3.5 x 2.4 previously 3.0 x 2.9 cm. Right parasternal mass is decreased in size measuring 3.4 x 3.4 cm.  Subcarinal 
 adenopathy measures 2.8 x 2.3 cm increased in size. There is increased size of 
the nodular component adjacent to the xiphoid now measuring 2.6 x 3.3 cm. SWATI: Left hilar lymph node measures 3.2 x 2.4 cm previously 2.5 x 1.8 cm. THORACIC AORTA: No dissection or aneurysm. MAIN PULMONARY ARTERY: Normal in caliber. TRACHEA/BRONCHI: Patent. ESOPHAGUS: No wall thickening or dilatation. HEART: Normal in size. PLEURA: No effusion or pneumothorax. LUNGS: No nodule, mass, or airspace disease. LIVER: Multiple masses are noted and better visualized on current study 
secondary to the addition of intravenous contrast. Comparison lesions measure 
previously, there is a 3.5 x 2.8 cm mass in the right hepatic lobe series 3 
image 58 slightly increased in size. There is a 3.6 x 2.9 cm mass in the left 
hepatic lobe slightly decreased in size series 3 image 56. Left hepatic lobe 
mass series 3 image 45 measuring 4.0 x 2.8 cm slightly increased in size. There 
is extensive tumor infiltration in the liver hilum with narrowing of the main 
portal vein as well as the intrahepatic portal veins. The common hepatic artery 
is not well visualized and likely occluded with some collateral flow noted in 
the liver hilum. GALLBLADDER: Not visualized SPLEEN: Mass in the splenic hilum increased in size measuring 7.4 x 4.3 cm with 
invasion into the splenic hilum. PANCREAS: The pancreas is encased by tumor. ADRENALS: There is likely involvement of the left adrenal gland tumor. KIDNEYS: Right nephroureteral stent with mild to moderate right-sided 
hydronephrosis. STOMACH: Tumor encasing the gastric antrum. SMALL BOWEL: No dilatation or wall thickening. COLON: Tumor abutting the tumor encasing the rectosigmoid junction. Tumor 
abutting the transverse colon. APPENDIX: Unremarkable. PERITONEUM: Some free fluid noted in the pelvis. Increased size of mesenteric 
masses.  Representative examples: 7.8 x 4.3 cm tumor anterior peritoneum series 3 
image 84. No pelvic tumor 7.5 x 4.8 cm series 3 image 100. RETROPERITONEUM:  
Right pelvic sidewall increased in size. Large right lateral lymph node 
measuring 3.1 x 2.0 cm not significantly changed. REPRODUCTIVE ORGANS: The visualized URINARY BLADDER: No mass or calculus. BONES: No destructive bone lesion. ADDITIONAL COMMENTS: N/A IMPRESSION IMPRESSION: 
1. There is some variable response to therapy with several lesions decreased in 
size. However a majority of the lesions have increased in size consistent with 
progression of disease with involvement of the mediastinum, liver masses, and 
peritoneal carcinomatosis. 2.  Increase right-sided hydronephrosis. [facility] CT C/A/P 10/2/19:  
FINDINGS: 
THYROID: Visualized gland is unremarkable. MEDIASTINUM: Upper left paratracheal node measures 2.2 x 3.4 cm (2, 8), 
previously 2.3 x 3.5 cm. Superior prevascular node measures 2.9 x 3.0 cm, 
previously 3.5 x 3.8 cm. Poorly delineated subcarinal node measures about 3.4 x 
4.8 cm, previously 3.6 x 4.8 cm. Precardiac nodes measure 1.9 x 2.5 cm and 1.7 x 
2.4 cm (2, 39), previously 2.1 x 2.7 cm and 2.0 x 2.0 cm. SWATI: No mass or lymphadenopathy. THORACIC AORTA: No dissection or aneurysm. MAIN PULMONARY ARTERY: Normal in caliber. TRACHEA/BRONCHI: Patent. ESOPHAGUS: No wall thickening or dilatation. HEART: Normal in size. PLEURA: No effusion or pneumothorax. LUNGS: No nodule, mass, or airspace disease. LIVER: Multiple hypodense lesions suspicious for metastases, measuring up to 2.5 
x 4.0 cm in segment 2 (2, 43) series, previously 2.8 x 3.7 cm, 3.2 x 4.6 cm in 
segment 3 (2, 55), previously 3.1 x 4.5 cm, and partially exophytic from the 
posterior inferior right lobe segment 6 measuring 2.5 x 3.5 cm, previously 2.8 x 
3.4 cm. GALLBLADDER: Unremarkable. SPLEEN: Central 1.9 x 3.1 cm hypodensity (2, 47), previously 1.9 x 2.3 cm. Otherwise unremarkable. PANCREAS: Inseparable from bulky central abdominal adenopathy with no identified 
ductal dilation. ADRENALS: Unremarkable. KIDNEYS: Right hydronephrosis has improved status post right nephroureteral 
stent placement. Kidneys otherwise appear unremarkable. STOMACH: Unremarkable. SMALL BOWEL: No dilatation or wall thickening. COLON: No dilation or wall thickening. APPENDIX: Unremarkable. PERITONEUM: Extensive peritoneal implants with representative largest lesions 
measuring 4.4 x 6.1 cm and the left adnexal region (2, 98), previously 4.3 x 5.7 
cm, 3.8 x 5.9 cm in the anterior midline (2, 81), previously 3.4 x 5.4 cm, and 
2.8 x 4.7 cm in the right abdomen (2, 78), previously 3.2 x 4.7 cm. RETROPERITONEUM: Extensive rocio hepatis, retroperitoneal, and pelvic sidewall 
adenopathy. Conglomerate nkechi mass in the rocio hepatis region measures 5.7 x 
11.1 cm (2, 58) is, previously 6.3 x 12.2 cm. Aortocaval node measures 3.7 x 5.3 
cm (2, 77), previously 4.5 x 5.4 cm. Right pelvic sidewall node measures 2.9 x 
4.2 cm (2, 103), previously 3.4 x 4.2 cm. Left external iliac node measures 3.0 
x 4.8 cm (2, 101), previously 3.3 x 4.8 cm. REPRODUCTIVE ORGANS: Uterus and ovaries are surgically absent. URINARY BLADDER: No mass or calculus. Distal end of right nephroureteral stent 
positioned within bladder lumen. BONES: Degenerative spine change. No acute fracture or aggressive lesion. ADDITIONAL COMMENTS: Mass centered at the right sternocostal junction measures 5.3 x 5.4 cm (2, 21), previously 4.8 x 5.7 cm. Post surgical changes in the 
anterior abdominal wall. Left Port-A-Cath in place. Bilateral inguinal 
adenopathy measuring 2.1 x 3.2 cm on the right (2, 115), previously 2.3 x 3.7 cm 
and 2.7 x 2.8 cm on the left (2, 108), previously 2.9 x 3.1 cm. Carla Frizzle IMPRESSION IMPRESSION: Overall response to disease with either decrease in size or no 
significant change in extensive nkechi metastases, hepatic metastases, right sternal metastasis, and peritoneal carcinomatosis as above. No new metastasis or 
progressive metastasis identified. Status post right nephroureteral stent with 
interval resolution of right hydronephrosis. CT C/A/P 2/5/19: 
FINDINGS:  
THYROID: No nodule. MEDIASTINUM: Mediastinal lymphadenopathy is unchanged. A reference superior 
mediastinal lymph node is stable measuring 3.3 cm. SWATI: No mass or lymphadenopathy. THORACIC AORTA: No dissection or aneurysm. MAIN PULMONARY ARTERY: Normal in caliber. TRACHEA/BRONCHI: Patent. ESOPHAGUS: No wall thickening or dilatation. HEART: Normal in size. PLEURA: No effusion or pneumothorax. LUNGS: No nodule, mass, or airspace disease. LIVER: Evaluation of the liver is limited by lack of intravenous contrast. There 
is a vague 4.4 cm hypodense lesion in the left hepatic lobe previously measuring 3.0 cm. Lesion in the right hepatic lobe now measures 3.3 cm, previously 
measuring 2.7 cm. GALLBLADDER: Unremarkable. SPLEEN: Central soft tissue abnormality has increased in size, now measuring 4.9 
cm, previously measuring 3.1 cm. PANCREAS: Not well evaluated given the massive lymphadenopathy and lack of 
intravenous contrast. 
ADRENALS: Unremarkable. KIDNEYS: There is no moderate right hydronephrosis. STOMACH: Unremarkable. SMALL BOWEL: No dilatation or wall thickening. COLON: No dilatation or wall thickening. APPENDIX: Not visualized. PERITONEUM: Bulky gastrohepatic, periceliac, portacaval, and mesenteric 
lymphadenopathy has increased. There is secondary invasion of the liver. Maximum 
dimension in the upper abdomen is currently 10.4 cm, previously 9.3 cm. Elemental soft tissue nodules have increased in size, with a reference 7.0 cm 
nodule in the left abdomen previously measuring 5.5 cm. Soft tissue nodule in 
the deep pelvis between the bladder and colon has increased in size as well. RETROPERITONEUM: No lymphadenopathy or aortic aneurysm. REPRODUCTIVE ORGANS: The uterus and ovaries are surgically absent. URINARY BLADDER: No mass or calculus. BONES: Soft tissue mass involving the right sternum has not changed. Degenerative changes are seen in the thoracic and lumbar spine. ADDITIONAL COMMENTS: N/A IMPRESSION IMPRESSION: 
Stable mediastinal lymphadenopathy. Progressive abdominal lymphadenopathy as 
described above. Liver lesions have increased in size compared to the prior 
exam. Stable right sternal mass. Moderate right hydroureteronephrosis is now 
noted which appears to be related to the lymphadenopathy. ROS Constitutional: no weight loss, fever, night sweats Respiratory: no cough, shortness of breath, or wheezing Cardiovascular: no chest pain or dyspnea on exertion. Reports chest wall mass smaller, no associated pain. Heme: No abnormal bleeding Gastrointestinal: no abdominal pain, change in bowel habits, or black or bloody stools Genito-Urinary: no dysuria, trouble voiding, or hematuria Musculoskeletal: + swelling in ankle - bilateral, mostly only at end of day Neurological: mild neuropathy Derm: pigmentation/induration medial left leg. Prior injury from fall. Psych: positive for depression - controlled PMH: 
Past Medical History:  
Diagnosis Date  Adverse effect of anesthesia   
 sleep apnea cpap machine useage  Anemia  Arthritis DDD low back and knees  Asthma   
 uses albuterol prn only; none x 6 mos. Never hospitalized  BRCA negative 1/2013  Calculus of kidney  Chronic kidney disease   
 kidney stones  Chronic pain   
 knee pain bilat  CINV (chemotherapy-induced nausea and vomiting) 8/21/2014  Decreased hearing, right PATIENT STATES THIS IS DUE TO CHEMOTHERAPY  Diabetes ([facility] [address] Age 46 IDDM  Environmental allergies  Fibromyalgia  GERD (gastroesophageal reflux disease)   
 controlled with med  Hx of pulmonary embolus during pregnancy 1/18/2015  Hydronephrosis due to obstruction of ureter 3/18/2019  Hypertension  Ill-defined condition Sepsis  -  SOURCE PORT  
 Ill-defined condition 2016  
 chemotherapy  Mass of chest wall 2018  Morbid obesity ([address]  Murmur, heart  Other and unspecified hyperlipidemia  Ovarian cancer ([address] 2012, 2014  
 serous, high grade, stage IIIc. s/p chemotx (has port)  Psychiatric disorder Depression/Anxiety  Rectal bleeding  Thromboembolus ([address]  POST PARTUM; resolved- PELVIS  Thyroid disease HYPOTHYROID  Unspecified sleep apnea CPAP, Dr. Terry Roque PSH: 
Past Surgical History:  
Procedure Laterality Date  BREAST SURGERY PROCEDURE UNLISTED Lymph node in left breast 2017 [place]  
  X2  
 HX HYSTERECTOMY  2012 ROULA/BSO, tumor debulking, omentectomy for ovarian cancer  HX ORTHOPAEDIC Right   
 ankle-FX, R  
 HX ORTHOPAEDIC Right   
 FX R ARM  
 HX UROLOGICAL Right  STENT FOR KIDNEY STONES  
 HX VASCULAR ACCESS  2015  
 right chest- port placed  HX VASCULAR ACCESS Left 2017 [facility] CATH  
 
SOC: 
Social History Socioeconomic History  Marital status:  Spouse name: Not on file  Number of children: Not on file  Years of education: Not on file  Highest education level: Not on file Occupational History  Not on file Social Needs  Financial resource strain: Not on file  Food insecurity Worry: Not on file Inability: Not on file  Transportation needs Medical: Not on file Non-medical: Not on file Tobacco Use  Smoking status: Never Smoker  Smokeless tobacco: Never Used Substance and Sexual Activity  Alcohol use: Yes Comment: 1 drink per month  Drug use: No  
 Sexual activity: Yes Lifestyle  Physical activity Days per week: Not on file Minutes per session: Not on file  Stress: Not on file Relationships  Social connections Talks on phone: Not on file Gets together: Not on file Attends Judaism service: Not on file Active member of club or organization: Not on file Attends meetings of clubs or organizations: Not on file Relationship status: Not on file  Intimate partner violence Fear of current or ex partner: Not on file Emotionally abused: Not on file Physically abused: Not on file Forced sexual activity: Not on file Other Topics Concern  Not on file Social History Narrative Lives in [place] alone.  passed away in 5/16 of a heart attack. Has 2 sons. Disability. 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 Enjoys swimming and going to the gym. Family History Family History Problem Relation Age of Onset  Hypertension Mother [facility] Arthritis-osteo Mother  Hypertension Father  Arthritis-osteo Father  Cancer Father PROSTATE  COPD Father  Hypertension Brother  Elevated Lipids Brother  Arthritis-osteo Brother  Hypertension Brother  Elevated Lipids Brother  Obesity Brother  Cancer Brother PROSTATE  Anesth Problems Son DELAYED AWAKENING  
 Diabetes Maternal Grandmother  Anesth Problems Paternal Grandmother PATIENT STATES GRANDMOTHER'S HEART STOPPED DURING SURGERY Medications: (reviewed) Current Outpatient Medications on File Prior to Visit Medication Sig Dispense Refill  doxazosin (CARDURA) 1 mg tablet Take 1 mg by mouth daily.  SULFAMETHOXAZOLE-TRIMETHOPRIM PO Take  by mouth two (2) times a day. 7 days  polyethylene glycol (MIRALAX) 17 gram packet Take 1 Packet by mouth daily for 60 days. To prevent constipation with pain medication or any cause 60 Packet 0  
 Zejula 100 mg cap Take 100 mg by mouth daily.  60 Cap 4  
 fluticasone propionate (FLONASE) 50 mcg/actuation nasal spray 2 SPRAYS IN BOTH NOSTRILS DAILY 1 Bottle 0  
  omega-3 acid ethyl esters (LOVAZA) 1 gram capsule TAKE 1 CAPSULE BY MOUTH TWICE A  Cap 2  cycloSPORINE (RESTASIS) 0.05 % dpet  amoxicillin-clavulanate (AUGMENTIN) 875-125 mg per tablet  dexAMETHasone (DECADRON) 4 mg tablet Take 2 tablets with breakfast the day before chemo and for 2 days after chemo 36 Tab 2  
 lidocaine-prilocaine (EMLA) topical cream Apply small amount over port area and cover with band aid one hour before chemo 30 g 3  
 ondansetron (ZOFRAN ODT) 4 mg disintegrating tablet Take 1 Tab by mouth every eight (8) hours as needed for Nausea. Indications: nausea and vomiting caused by cancer drugs 30 Tab 2  predniSONE (DELTASONE) 50 mg tablet Take 1 tablet 13 hours prior to scan, take 1 tablet 7 hours prior to scan and take 1 tablet one hour prior to scan 3 Tab 0  
 albuterol (PROVENTIL VENTOLIN) 2.5 mg /3 mL (0.083 %) nebu Take 2.5 mg by inhalation.  dextroamphetamine-amphetamine (ADDERALL) 20 mg tablet TAKE 1 TABLET BY MOUTH EVERY DAY  0  
 rosuvastatin (CRESTOR) 10 mg tablet TAKE 1 TABLET BY MOUTH EVERY DAY  0  
 telmisartan (MICARDIS) 20 mg tablet TAKE 1 TABLET BY MOUTH EVERY DAY 90 Tab 3  
 atorvastatin (LIPITOR) 20 mg tablet  BD ULTRA-FINE SHORT PEN NEEDLE 31 gauge x 5/16\" ndle USE TO INJECT INSULIN 5 TIMES DAILY 200 Pen Needle 11  
 insulin aspart U-100 (NOVOLOG FLEXPEN U-100 INSULIN) 100 unit/mL (3 mL) inpn INJECT 20 UNITS BEFORE EACH MEAL + 4 UNITS FOR EVERY 50 MG/DL ABOVE 150 MG/DL.  UNITS PER DAY 90 Adjustable Dose Pre-filled Pen Syringe 3  
 LEVEMIR FLEXTOUCH U-100 INSULN 100 unit/mL (3 mL) inpn INJECT 30 UNITS IN AM AND 50 UNITS AT NIGHT. INCREASE AS DIRECTED TO  UNITS/DAY. 90 Adjustable Dose Pre-filled Pen Syringe 3  DEXILANT 60 mg CpDB capsule (delayed release) TAKE 1 CAPSULE BY MOUTH EVERY DAY 90 Cap 3  
 LINZESS 145 mcg cap capsule TAKE 1 CAPSULE BY MOUTH EVERY DAYY 30 Cap 0  
  levothyroxine (SYNTHROID) 150 mcg tablet TAKE 1 TABLET BY MOUTH EVERY DAY BEFORE BREAKFAST 90 Tab 0  
 EPINEPHrine (EPIPEN) 0.3 mg/0.3 mL injection INJECT 0.3 ML BY INTRAMUSCULAR ROUTE ONCE AS NEEDED FOR UP TO 1 DOSE.  1  
 glucose blood VI test strips (TRUE METRIX GLUCOSE TEST STRIP) strip by Does Not Apply route See Admin Instructions. 30 Strip 5  
 lancets misc by Does Not Apply route daily. True Metrix lancets- test blood sugar once per day 30 Each 5  Blood-Glucose Meter monitoring kit Check up to tid, as directed 1 Kit 0  
 azelastine (ASTELIN) 137 mcg (0.1 %) nasal spray 1 [place] by Both Nostrils route daily as needed. Use in each nostril as directed  insulin detemir U-100 (LEVEMIR) 100 unit/mL injection 20 Units by SubCUTAneous route daily (with lunch).  montelukast (SINGULAIR) 10 mg tablet Take 10 mg by mouth nightly.  ascorbic acid, vitamin C, (VITAMIN C) 1,000 mg tablet Take 1,000 mg by mouth daily.  albuterol (PROVENTIL VENTOLIN) 2.5 mg /3 mL (0.083 %) nebulizer solution 2.5 mg by Nebulization route every four (4) hours as needed for Wheezing.  Liraglutide (VICTOZA) 0.6 mg/0.1 mL (18 mg/3 mL) pnij 1.8 mg by SubCUTAneous route daily (with lunch). 3 Pen 3  
 SYMBICORT 160-4.5 mcg/actuation HFAA Take 2 Puffs by inhalation two (2) times daily as needed. 2 Inhaler 3  clonazePAM (KLONOPIN) 0.5 mg tablet Take 0.5 mg by mouth nightly as needed.  sertraline (ZOLOFT) 50 mg tablet TAKE 1 TABLET BY MOUTH as needed  1  cholecalciferol, VITAMIN D3, (VITAMIN D3) 5,000 unit tab tablet Take 5,000 Units by mouth daily.  furosemide (LASIX) 40 mg tablet Take 40 mg by mouth every other day. 3 No current facility-administered medications on file prior to visit. Allergies: (reviewed) Allergies Allergen Reactions  Carboplatin Other (comments)   Patient developed shortness of breath, felt faint, coughing, skin flushed and \"itchy\". This occurred on the patients 8th treatment.  Iodinated Contrast Media Rash 13 hr pre-medication prior to IV contrast.  
Patient has done well with 1 hr pre-medication of (Solu-Medrol) 40mg &  (Benadryl) 50mg.  Lipitor [Atorvastatin] Myalgia  Shellfish Containing Products Hives  Tape [Adhesive] Itching and Other (comments) \"op site-- clear,thin tape\"--caused blister  Tomato Hives  Iodine And Iodide Containing Products Rash  Olmesartan Other (comments)  Losartan Other (comments)  
  headaches  Percocet [Oxycodone-Acetaminophen] Other (comments) Fever; however, current home med OBJECTIVE Physical Exam 
Visit Vitals /73 (BP 1 Location: Left arm, BP Patient Position: Sitting) Pulse 84 Ht 5' 8\" (1.727 m) Wt 258 lb (117 kg) LMP 09/07/2011 BMI 39.23 kg/m² Physical Exam: 
General: Alert and oriented. No acute distress. Well-nourished HEENT: No thyroid enlargment. Neck supple without restrictions. Sclera normal. Normal occular motion. Moist mucous membranes. Lymphatics: No evidence of axillary, cervical, or subclavicular adenopathy. Respiratory: clear to auscultation and percussion to the bases. No CVAT. Chest wall mass is still palpable along the superior sternum at the level of the superior aspect of the breasts, but improved. Normal overlying skin Cardiovascular: regular rate and rhythm. No murmurs, rubs, or gallops. Gastrointestinal: soft, non-tender, non-distended, no masses or organomegaly. Well-healed incision. Musculoskeletal: normal gait. No joint tenderness, deformity or swelling. No muscular tenderness. Extremities: extremities normal, atraumatic, mild 1+ edema bilaterally. Pelvic: deferred today Neuro: Grossly intact. Normal gait and movement. No acute deficit Skin: No evidence of rashes or skin changes. DATE REVIEW as available: 
Lab Results Component Value Date/Time  WBC 4.7 09/17/2020 08:12 AM  
 Hemoglobin (POC) 10.5 (L) 03/20/2017 12:15 PM  
 HGB 7.4 (L) 09/17/2020 08:12 AM  
 Hematocrit (POC) 31 (L) 03/20/2017 12:15 PM  
 HCT 24.5 (L) 09/17/2020 08:12 AM  
 PLATELET 135 79/66/9560 08:12 AM  
 MCV 98.8 09/17/2020 08:12 AM  
 
Lab Results Component Value Date/Time  
 ABS. NEUTROPHILS 2.7 09/17/2020 08:12 AM  
 
Lab Results Component Value Date/Time Sodium 143 09/17/2020 08:12 AM  
 Potassium 4.3 09/17/2020 08:12 AM  
 Chloride 114 (H) 09/17/2020 08:12 AM  
 CO2 23 09/17/2020 08:12 AM  
 Anion gap 6 09/17/2020 08:12 AM  
 Glucose 131 (H) 09/17/2020 08:12 AM  
 BUN 30 (H) 09/17/2020 08:12 AM  
 Creatinine 1.31 (H) 09/17/2020 08:12 AM  
 BUN/Creatinine ratio 23 (H) 09/17/2020 08:12 AM  
 GFR est AA 50 (L) 09/17/2020 08:12 AM  
 GFR est non-AA 42 (L) 09/17/2020 08:12 AM  
 Calcium 8.9 09/17/2020 08:12 AM  
 Bilirubin, total 0.4 09/03/2020 08:10 AM  
 Alk. phosphatase 90 09/03/2020 08:10 AM  
 Protein, total 8.0 09/03/2020 08:10 AM  
 Albumin 3.2 (L) 09/03/2020 08:10 AM  
 Globulin 4.8 (H) 09/03/2020 08:10 AM  
 A-G Ratio 0.7 (L) 09/03/2020 08:10 AM  
 ALT (SGPT) 20 09/03/2020 08:10 AM  
 
Ca-125: 
6/11/2020: 268 
9/3/2020: 187 ECHO 7/17/18 Left ventricle: Systolic function was normal. Ejection fraction was estimated in the range of 55 % to 60 %. There were no regional wall motion 
abnormalities. Wall thickness was mildly increased. Left atrium: The atrium was mildly dilated. Mitral valve: There was mild regurgitation. Aortic valve: Leaflets exhibited sclerosis without stenosis. Not well visualized. IMPRESSION AND PLAN: 
Ms. Felix Milo is a 62 y.o. female with recurrent, metastatic ovarian cancer. Heavily pre-treated. Lost to follow-up since 9/2018, at which time patient self-discontinued weekly paclitaxel. Initiated Berenice Marycruz on 1/23/19. Held after 1 month secondary to rising creatinine. Found to have right hydronephrosis.  Right ureteral stent placed 3/18/19 with normalization of creatinine. Completed 2700 cGy to the sternun 7/11/19. Restarted Zejula at 100mg/daily on 4/23/19. Now with rising Ca-125 and progression on CT 6/24/2020. Initiated on weekly Taxol 80mg/m2 days 1, 8, and 15 in a 28-day cycle on 8/6/2020. Problem List Items Addressed This Visit Circulatory Pulmonary hypertension, mild ([facility] Hypertension Endocrine Type 2 diabetes mellitus with nephropathy ([facility] Immune Lymphadenopathy, mediastinal  
  
 Reproductive Ovarian cancer ([facility] - Primary Respiratory SHABANA on CPAP Other S/P ureteral stent placement Reviewed patient's course to date. Presents today for consideration of cycle 3 weekly paclitaxel. Tolerated cycle 1-2 well. Her Ca-125 is responding to treatment after 2 cycles. Will follow-up pre-chemo labs. Will plan for 3-4 cycles followed by imaging. Would plan to treat until progression, or may consider maintenance PARPi after 6 cycles with either olaparib or rucaparib. Urology is managing her stent and recurrent UTIs. Has not required antibiotics for some time. Plan for stent exchange on 10/30/2020 per patient. The patient has underlying diabetes and neuropathy. She has follow-up next week regarding numbness in the bottoms of her feet. I expressed my concerns that we will need to watch this closely while on weekly Taxol, as this may certainly worsen her diabetic neuropathy. All questions and concerns were addressed with the patient and she is comfortable with the plan.  
 
Summer Wasserman MD

## 2020-09-22 NOTE — PROGRESS NOTES
Chemotherapy, post C2D15 of Taxol 1. Have you been to the ER, urgent care clinic since your last visit? Hospitalized since your last visit?  no 
 
2. Have you seen or consulted any other health care providers outside of the [address] [place] [address] since your last visit? Include any pap smears or colon screening.    no

## 2020-09-30 NOTE — TELEPHONE ENCOUNTER
Pt , she has been admitted to [facility], per pt, Dr. Emily Kay needs to still talk to Dr. Kadie Garcia, pt was advised to have Dr. Emily Kay to call our office, pt states he needs to know how Dr. Kadie Garcia wants her treated for blood clot

## 2020-10-02 NOTE — TELEPHONE ENCOUNTER
Pt  today, Friday 10/2/2020, she is still in the hospital, she states she has received 4 blood transfusions and her HGB is still dropping, she states she needs to talk to you regarding her chemotherapy next Thursday, she does not have a discharge date from the hospital as of yet, she is requesting that you call her on Monday 10/5 at [phone]

## 2020-10-05 NOTE — TELEPHONE ENCOUNTER
I returned pt call. She states she was discharge from [facility] on 10/3/20 and told to f/u with Dr. Roger Velasco.   appt made for 10/6/20 at 3:00pm.

## 2020-10-07 NOTE — PROGRESS NOTES
[address] [facility], [address] 
P [phone]  F [phone] Office Visit Patient ID: 
Name: Vallarie Bars. Leopoldo Ludwig MRN: [id number] : 1960/60 y.o. Visit date: 10/7/2020 LEONID Atkinson is a 62 y.o.  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 The patient's previous treatment procedures include primary Taxol/carbo with retreatment in  and Doxil/Avastin, topotecan, gemzar and now single agent weekly Taxol initiated 18 following progression with chest wall involvement. In 2018, patient did not follow up and self-discontinued her treatment regimen. She presented to the ER on 18 with SOB. Negative workup for PE or MI. Admitted to Hospitalist service from -18. Managed for acute asthma exacerbation, acute FELICIA, and hyperkalemia. The patient was initiated on Lucio Helm on 19. Underwent repeat CT C/A/P on 19 for purposes of following response to treatment. Patient was tolerating Lucio Helm well for the first month, but then had a rise in her creatinine. However, she was also found to have hydronephrosis. Underwent right ureteral stent placement on 3/18/19. Restarted Zejula afterwards, although with continued rise in creatinine to >2. Held Lucio Helm again and restarted Zejula at 100mg/daily on 19. Presents today for follow-up and consideration of continuation of Lucio Helm. Completed 2700 cGy of radiation to her chest wall mass on 19. Tolerating treatment well. Held for one week in 10/2019 for blood transfusion secondary to anemia. Normal iron and B12 labs. Her Ca-125 has bounced around, but has been steadily declining recently. CT C/A/P on 10/2/19 with improvement in her disease. CT C/A/P 2020 demonstrates progression. Rising Ca-125. On 2020 initiated on weekly paclitaxel. Presents today for hospital follow-up after [facility]. She reports undergoing manometry testing for neuropathy of her right leg, and then she reports swelling in the entire leg. She went to the ER at that time and was diagnosed with a right LE DVT. She was initiated on Lovenox and transitioned to Eliquis. She is scheduled to start cycle 4 of treatment next week. She missed the last weekly dose of cycle 3. She has tolerated treatment well thus far, and denies complaints except for pain and swelling in her right leg. She denies pain in her chest wall or abdomen. She does have some constipation on the day of treatment, which is relieved with Miralax. Reports plan for ureteral stent exchange at the end of October. She has not required any antibiotics recently. Denies change in appetite or bowel habits. Denies vaginal bleeding, hematuria, hematochezia, constipation, diarrhea, nausea, vomiting, CP, SOB, fevers, or chills. 
   
OncTx History: 
9/21/12: MARAH/BSO/Cytoreductive surgery on 9/21/12 noted carcinomatosis/omental caking. 10/2012-2/2013: 6 cycles Carbo/Taxol 8/2014-11/2014: 6 Cycles carbo/Taxol 3/2015-5/13/15: 3 cycles Avastin/Doxil until progression 7/2015-8/2015 Weekly Topotecan  
12/2015-3/2016: Weekly Topotecan 2/2017-9/2017Ninetta Pester D1/D8 Q28 days for 6 cycles 4/2018: CT core needle bx chest wall mass: Metastatic high-grade serous carcinoma (see comment) General Categorization Positive for malignancy. 5/8/2018-Present: Taxol  T3,8,18; S/P  Cycle #4  8/14/2018. Patient discontinued treatment secondary to side effects and fatigue. 1/23/2019 - Initiated Jin Martinez. Held do to rising creatinine. However, she was also found to have hydronephrosis. Underwent right ureteral stent placement on 3/18/19. CT C/A/P 6/24/2020 with mixed response, but overall progression. Rising Ca-125 now 268. 8/6/2020: Initiated weekly paclitaxel 80mg/m2 days , 8, and 15 of 28-day cycle. Imaging Review:  
CT C/A/P 6/24/2020: 
FINDINGS:  
THYROID: No nodule. MEDIASTINUM: Left paratracheal adenopathy now measures 3.7 x 2.0 cm increased in 
size. Mass in AP window which extends superiorly into the left infraclavicular 
region and inferiorly into the left hilum is also increase in size. When 
measured in a similar fashion this measures 3.5 x 2.4 previously 3.0 x 2.9 cm. Right parasternal mass is decreased in size measuring 3.4 x 3.4 cm. Subcarinal 
adenopathy measures 2.8 x 2.3 cm increased in size. There is increased size of 
the nodular component adjacent to the xiphoid now measuring 2.6 x 3.3 cm. SWATI: Left hilar lymph node measures 3.2 x 2.4 cm previously 2.5 x 1.8 cm. THORACIC AORTA: No dissection or aneurysm. MAIN PULMONARY ARTERY: Normal in caliber. TRACHEA/BRONCHI: Patent. ESOPHAGUS: No wall thickening or dilatation. HEART: Normal in size. PLEURA: No effusion or pneumothorax. LUNGS: No nodule, mass, or airspace disease. LIVER: Multiple masses are noted and better visualized on current study 
secondary to the addition of intravenous contrast. Comparison lesions measure 
previously, there is a 3.5 x 2.8 cm mass in the right hepatic lobe series 3 
image 58 slightly increased in size. There is a 3.6 x 2.9 cm mass in the left 
hepatic lobe slightly decreased in size series 3 image 56. Left hepatic lobe 
mass series 3 image 45 measuring 4.0 x 2.8 cm slightly increased in size. There 
is extensive tumor infiltration in the liver hilum with narrowing of the main 
portal vein as well as the intrahepatic portal veins. The common hepatic artery 
is not well visualized and likely occluded with some collateral flow noted in 
the liver hilum. GALLBLADDER: Not visualized SPLEEN: Mass in the splenic hilum increased in size measuring 7.4 x 4.3 cm with 
invasion into the splenic hilum. PANCREAS: The pancreas is encased by tumor. ADRENALS: There is likely involvement of the left adrenal gland tumor. KIDNEYS: Right nephroureteral stent with mild to moderate right-sided 
hydronephrosis. STOMACH: Tumor encasing the gastric antrum. SMALL BOWEL: No dilatation or wall thickening. COLON: Tumor abutting the tumor encasing the rectosigmoid junction. Tumor 
abutting the transverse colon. APPENDIX: Unremarkable. PERITONEUM: Some free fluid noted in the pelvis. Increased size of mesenteric 
masses. Representative examples: 7.8 x 4.3 cm tumor anterior peritoneum series 3 
image 84. No pelvic tumor 7.5 x 4.8 cm series 3 image 100. RETROPERITONEUM:  
Right pelvic sidewall increased in size. Large right lateral lymph node 
measuring 3.1 x 2.0 cm not significantly changed. REPRODUCTIVE ORGANS: The visualized URINARY BLADDER: No mass or calculus. BONES: No destructive bone lesion. ADDITIONAL COMMENTS: N/A IMPRESSION IMPRESSION: 
1. There is some variable response to therapy with several lesions decreased in 
size. However a majority of the lesions have increased in size consistent with 
progression of disease with involvement of the mediastinum, liver masses, and 
peritoneal carcinomatosis. 2.  Increase right-sided hydronephrosis. [facility] CT C/A/P 10/2/19:  
FINDINGS: 
THYROID: Visualized gland is unremarkable. MEDIASTINUM: Upper left paratracheal node measures 2.2 x 3.4 cm (2, 8), 
previously 2.3 x 3.5 cm. Superior prevascular node measures 2.9 x 3.0 cm, 
previously 3.5 x 3.8 cm. Poorly delineated subcarinal node measures about 3.4 x 
4.8 cm, previously 3.6 x 4.8 cm. Precardiac nodes measure 1.9 x 2.5 cm and 1.7 x 
2.4 cm (2, 39), previously 2.1 x 2.7 cm and 2.0 x 2.0 cm. SWATI: No mass or lymphadenopathy. THORACIC AORTA: No dissection or aneurysm. MAIN PULMONARY ARTERY: Normal in caliber. TRACHEA/BRONCHI: Patent. ESOPHAGUS: No wall thickening or dilatation. HEART: Normal in size. PLEURA: No effusion or pneumothorax. LUNGS: No nodule, mass, or airspace disease. LIVER: Multiple hypodense lesions suspicious for metastases, measuring up to 2.5 
x 4.0 cm in segment 2 (2, 43) series, previously 2.8 x 3.7 cm, 3.2 x 4.6 cm in 
segment 3 (2, 55), previously 3.1 x 4.5 cm, and partially exophytic from the 
posterior inferior right lobe segment 6 measuring 2.5 x 3.5 cm, previously 2.8 x 
3.4 cm. GALLBLADDER: Unremarkable. SPLEEN: Central 1.9 x 3.1 cm hypodensity (2, 47), previously 1.9 x 2.3 cm. Otherwise unremarkable. PANCREAS: Inseparable from bulky central abdominal adenopathy with no identified 
ductal dilation. ADRENALS: Unremarkable. KIDNEYS: Right hydronephrosis has improved status post right nephroureteral 
stent placement. Kidneys otherwise appear unremarkable. STOMACH: Unremarkable. SMALL BOWEL: No dilatation or wall thickening. COLON: No dilation or wall thickening. APPENDIX: Unremarkable. PERITONEUM: Extensive peritoneal implants with representative largest lesions 
measuring 4.4 x 6.1 cm and the left adnexal region (2, 98), previously 4.3 x 5.7 
cm, 3.8 x 5.9 cm in the anterior midline (2, 81), previously 3.4 x 5.4 cm, and 
2.8 x 4.7 cm in the right abdomen (2, 78), previously 3.2 x 4.7 cm. RETROPERITONEUM: Extensive rocio hepatis, retroperitoneal, and pelvic sidewall 
adenopathy. Conglomerate nkechi mass in the rocio hepatis region measures 5.7 x 
11.1 cm (2, 58) is, previously 6.3 x 12.2 cm. Aortocaval node measures 3.7 x 5.3 
cm (2, 77), previously 4.5 x 5.4 cm. Right pelvic sidewall node measures 2.9 x 
4.2 cm (2, 103), previously 3.4 x 4.2 cm. Left external iliac node measures 3.0 
x 4.8 cm (2, 101), previously 3.3 x 4.8 cm. REPRODUCTIVE ORGANS: Uterus and ovaries are surgically absent. URINARY BLADDER: No mass or calculus. Distal end of right nephroureteral stent 
positioned within bladder lumen. BONES: Degenerative spine change. No acute fracture or aggressive lesion. ADDITIONAL COMMENTS: Mass centered at the right sternocostal junction measures 5.3 x 5.4 cm (2, 21), previously 4.8 x 5.7 cm. Post surgical changes in the 
anterior abdominal wall. Left Port-A-Cath in place. Bilateral inguinal 
adenopathy measuring 2.1 x 3.2 cm on the right (2, 115), previously 2.3 x 3.7 cm 
and 2.7 x 2.8 cm on the left (2, 108), previously 2.9 x 3.1 cm. Amadeo Patterson IMPRESSION IMPRESSION: Overall response to disease with either decrease in size or no 
significant change in extensive nkechi metastases, hepatic metastases, right 
sternal metastasis, and peritoneal carcinomatosis as above. No new metastasis or 
progressive metastasis identified. Status post right nephroureteral stent with 
interval resolution of right hydronephrosis. CT C/A/P 2/5/19: 
FINDINGS:  
THYROID: No nodule. MEDIASTINUM: Mediastinal lymphadenopathy is unchanged. A reference superior 
mediastinal lymph node is stable measuring 3.3 cm. SWATI: No mass or lymphadenopathy. THORACIC AORTA: No dissection or aneurysm. MAIN PULMONARY ARTERY: Normal in caliber. TRACHEA/BRONCHI: Patent. ESOPHAGUS: No wall thickening or dilatation. HEART: Normal in size. PLEURA: No effusion or pneumothorax. LUNGS: No nodule, mass, or airspace disease. LIVER: Evaluation of the liver is limited by lack of intravenous contrast. There 
is a vague 4.4 cm hypodense lesion in the left hepatic lobe previously measuring 3.0 cm. Lesion in the right hepatic lobe now measures 3.3 cm, previously 
measuring 2.7 cm. GALLBLADDER: Unremarkable. SPLEEN: Central soft tissue abnormality has increased in size, now measuring 4.9 
cm, previously measuring 3.1 cm. PANCREAS: Not well evaluated given the massive lymphadenopathy and lack of 
intravenous contrast. 
ADRENALS: Unremarkable. KIDNEYS: There is no moderate right hydronephrosis. STOMACH: Unremarkable. SMALL BOWEL: No dilatation or wall thickening. COLON: No dilatation or wall thickening. APPENDIX: Not visualized. PERITONEUM: Bulky gastrohepatic, periceliac, portacaval, and mesenteric 
lymphadenopathy has increased. There is secondary invasion of the liver. Maximum 
dimension in the upper abdomen is currently 10.4 cm, previously 9.3 cm. Elemental soft tissue nodules have increased in size, with a reference 7.0 cm 
nodule in the left abdomen previously measuring 5.5 cm. Soft tissue nodule in 
the deep pelvis between the bladder and colon has increased in size as well. RETROPERITONEUM: No lymphadenopathy or aortic aneurysm. REPRODUCTIVE ORGANS: The uterus and ovaries are surgically absent. URINARY BLADDER: No mass or calculus. BONES: Soft tissue mass involving the right sternum has not changed. Degenerative changes are seen in the thoracic and lumbar spine. ADDITIONAL COMMENTS: N/A IMPRESSION IMPRESSION: 
Stable mediastinal lymphadenopathy. Progressive abdominal lymphadenopathy as 
described above. Liver lesions have increased in size compared to the prior 
exam. Stable right sternal mass. Moderate right hydroureteronephrosis is now 
noted which appears to be related to the lymphadenopathy. ROS Constitutional: no weight loss, fever, night sweats Respiratory: no cough, shortness of breath, or wheezing Cardiovascular: no chest pain or dyspnea on exertion. Reports chest wall mass smaller, no associated pain. Heme: No abnormal bleeding Gastrointestinal: no abdominal pain, change in bowel habits, or black or bloody stools Genito-Urinary: no dysuria, trouble voiding, or hematuria Musculoskeletal: + swelling in ankle - bilateral, mostly only at end of day Neurological: mild neuropathy Derm: pigmentation/induration medial left leg. Prior injury from fall. Psych: positive for depression - controlled PMH: 
Past Medical History:  
Diagnosis Date  Adverse effect of anesthesia   
 sleep apnea cpap machine useage  Anemia  Arthritis DDD low back and knees  Asthma uses albuterol prn only; none x 6 mos. Never hospitalized  BRCA negative 2013  Calculus of kidney  Chronic kidney disease   
 kidney stones  Chronic pain   
 knee pain bilat  CINV (chemotherapy-induced nausea and vomiting) 2014  Decreased hearing, right PATIENT STATES THIS IS DUE TO CHEMOTHERAPY  Diabetes ([address] Age 46 IDDM  Environmental allergies  Fibromyalgia  GERD (gastroesophageal reflux disease)   
 controlled with med  Hx of pulmonary embolus during pregnancy 2015  Hydronephrosis due to obstruction of ureter 3/18/2019  Hypertension  Ill-defined condition Sepsis  -  SOURCE PORT  
 Ill-defined condition 2016  
 chemotherapy  Mass of chest wall 2018  Morbid obesity ([address]  Murmur, heart  Other and unspecified hyperlipidemia  Ovarian cancer ([address] 2012, 2014  
 serous, high grade, stage IIIc. s/p chemotx (has port)  Psychiatric disorder Depression/Anxiety  Rectal bleeding  Thromboembolus ([address]  POST PARTUM; resolved- PELVIS  Thyroid disease HYPOTHYROID  Unspecified sleep apnea CPAP, Dr. Bandar Spencer PSH: 
Past Surgical History:  
Procedure Laterality Date  BREAST SURGERY PROCEDURE UNLISTED Lymph node in left breast 2017 [place]  
  X2  
 HX HYSTERECTOMY  2012 ROULA/BSO, tumor debulking, omentectomy for ovarian cancer  HX ORTHOPAEDIC Right   
 ankle-FX, R  
 HX ORTHOPAEDIC Right   
 FX R ARM  
 HX UROLOGICAL Right  STENT FOR KIDNEY STONES  
 HX VASCULAR ACCESS  2015  
 right chest- port placed  HX VASCULAR ACCESS Left 2017 [facility] CATH  
 
SOC: 
Social History Socioeconomic History  Marital status:  Spouse name: Not on file  Number of children: Not on file  Years of education: Not on file  Highest education level: Not on file Occupational History  Not on file Social Needs  Financial resource strain: Not on file  Food insecurity Worry: Not on file Inability: Not on file  Transportation needs Medical: Not on file Non-medical: Not on file Tobacco Use  Smoking status: Never Smoker  Smokeless tobacco: Never Used Substance and Sexual Activity  Alcohol use: Yes Comment: 1 drink per month  Drug use: No  
 Sexual activity: Yes Lifestyle  Physical activity Days per week: Not on file Minutes per session: Not on file  Stress: Not on file Relationships  Social connections Talks on phone: Not on file Gets together: Not on file Attends Taoism service: Not on file Active member of club or organization: Not on file Attends meetings of clubs or organizations: Not on file Relationship status: Not on file  Intimate partner violence Fear of current or ex partner: Not on file Emotionally abused: Not on file Physically abused: Not on file Forced sexual activity: Not on file Other Topics Concern  Not on file Social History Narrative Lives in [facility] alone.  passed away in 5/16 of a heart attack. Has 2 sons. Disability. 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 Enjoys swimming and going to the gym. Family History Family History Problem Relation Age of Onset  Hypertension Mother [employer] Arthritis-osteo Mother  Hypertension Father  Arthritis-osteo Father  Cancer Father PROSTATE  COPD Father  Hypertension Brother  Elevated Lipids Brother  Arthritis-osteo Brother  Hypertension Brother  Elevated Lipids Brother  Obesity Brother  Cancer Brother PROSTATE  Anesth Problems Son DELAYED AWAKENING  
 Diabetes Maternal Grandmother  Anesth Problems Paternal Grandmother PATIENT STATES GRANDMOTHER'S HEART STOPPED DURING SURGERY Medications: (reviewed) Current Outpatient Medications on File Prior to Visit Medication Sig Dispense Refill  traMADoL (ULTRAM) 50 mg tablet Take 50 mg by mouth every six (6) hours as needed for Pain.  apixaban (Eliquis) 5 mg tablet Take 5 mg by mouth two (2) times a day.  rosuvastatin (CRESTOR) 10 mg tablet Take 10 mg by mouth nightly.  doxazosin (CARDURA) 1 mg tablet Take  by mouth daily.  amoxicillin-clavulanate (AUGMENTIN) 875-125 mg per tablet  doxazosin (CARDURA) 1 mg tablet Take 1 mg by mouth daily.  SULFAMETHOXAZOLE-TRIMETHOPRIM PO Take  by mouth two (2) times a day. 7 days  dexAMETHasone (DECADRON) 4 mg tablet Take 2 tablets with breakfast the day before chemo and for 2 days after chemo 36 Tab 2  
 lidocaine-prilocaine (EMLA) topical cream Apply small amount over port area and cover with band aid one hour before chemo 30 g 3  
 ondansetron (ZOFRAN ODT) 4 mg disintegrating tablet Take 1 Tab by mouth every eight (8) hours as needed for Nausea. Indications: nausea and vomiting caused by cancer drugs 30 Tab 2  predniSONE (DELTASONE) 50 mg tablet Take 1 tablet 13 hours prior to scan, take 1 tablet 7 hours prior to scan and take 1 tablet one hour prior to scan 3 Tab 0  Zejula 100 mg cap Take 100 mg by mouth daily. 60 Cap 4  
 fluticasone propionate (FLONASE) 50 mcg/actuation nasal spray 2 SPRAYS IN BOTH NOSTRILS DAILY 1 Bottle 0  
 cycloSPORINE (RESTASIS) 0.05 % dpet  albuterol (PROVENTIL VENTOLIN) 2.5 mg /3 mL (0.083 %) nebu Take 2.5 mg by inhalation.  dextroamphetamine-amphetamine (ADDERALL) 20 mg tablet TAKE 1 TABLET BY MOUTH EVERY DAY  0  
 rosuvastatin (CRESTOR) 10 mg tablet TAKE 1 TABLET BY MOUTH EVERY DAY  0  
 telmisartan (MICARDIS) 20 mg tablet TAKE 1 TABLET BY MOUTH EVERY DAY 90 Tab 3  
 atorvastatin (LIPITOR) 20 mg tablet  BD ULTRA-FINE SHORT PEN NEEDLE 31 gauge x 5/16\" ndle USE TO INJECT INSULIN 5 TIMES DAILY 200 Pen Needle 11  
  insulin aspart U-100 (NOVOLOG FLEXPEN U-100 INSULIN) 100 unit/mL (3 mL) inpn INJECT 20 UNITS BEFORE EACH MEAL + 4 UNITS FOR EVERY 50 MG/DL ABOVE 150 MG/DL.  UNITS PER DAY 90 Adjustable Dose Pre-filled Pen Syringe 3  
 LEVEMIR FLEXTOUCH U-100 INSULN 100 unit/mL (3 mL) inpn INJECT 30 UNITS IN AM AND 50 UNITS AT NIGHT. INCREASE AS DIRECTED TO  UNITS/DAY. 90 Adjustable Dose Pre-filled Pen Syringe 3  DEXILANT 60 mg CpDB capsule (delayed release) TAKE 1 CAPSULE BY MOUTH EVERY DAY 90 Cap 3  
 LINZESS 145 mcg cap capsule TAKE 1 CAPSULE BY MOUTH EVERY DAYY 30 Cap 0  
 levothyroxine (SYNTHROID) 150 mcg tablet TAKE 1 TABLET BY MOUTH EVERY DAY BEFORE BREAKFAST 90 Tab 0  
 EPINEPHrine (EPIPEN) 0.3 mg/0.3 mL injection INJECT 0.3 ML BY INTRAMUSCULAR ROUTE ONCE AS NEEDED FOR UP TO 1 DOSE.  1  
 glucose blood VI test strips (TRUE METRIX GLUCOSE TEST STRIP) strip by Does Not Apply route See Admin Instructions. 30 Strip 5  
 lancets misc by Does Not Apply route daily. True Metrix lancets- test blood sugar once per day 30 Each 5  Blood-Glucose Meter monitoring kit Check up to tid, as directed 1 Kit 0  
 azelastine (ASTELIN) 137 mcg (0.1 %) nasal spray 1 [place] by Both Nostrils route daily as needed. Use in each nostril as directed  insulin detemir U-100 (LEVEMIR) 100 unit/mL injection 20 Units by SubCUTAneous route daily (with lunch).  montelukast (SINGULAIR) 10 mg tablet Take 10 mg by mouth nightly.  ascorbic acid, vitamin C, (VITAMIN C) 1,000 mg tablet Take 1,000 mg by mouth daily.  albuterol (PROVENTIL VENTOLIN) 2.5 mg /3 mL (0.083 %) nebulizer solution 2.5 mg by Nebulization route every four (4) hours as needed for Wheezing.  Liraglutide (VICTOZA) 0.6 mg/0.1 mL (18 mg/3 mL) pnij 1.8 mg by SubCUTAneous route daily (with lunch). 3 Pen 3  
 SYMBICORT 160-4.5 mcg/actuation HFAA Take 2 Puffs by inhalation two (2) times daily as needed.  2 Inhaler 3  
  clonazePAM (KLONOPIN) 0.5 mg tablet Take 0.5 mg by mouth nightly as needed.  sertraline (ZOLOFT) 50 mg tablet TAKE 1 TABLET BY MOUTH as needed  1  cholecalciferol, VITAMIN D3, (VITAMIN D3) 5,000 unit tab tablet Take 5,000 Units by mouth daily.  furosemide (LASIX) 40 mg tablet Take 40 mg by mouth every other day. 3 No current facility-administered medications on file prior to visit. Allergies: (reviewed) Allergies Allergen Reactions  Carboplatin Other (comments) Patient developed shortness of breath, felt faint, coughing, skin flushed and \"itchy\". This occurred on the patients 8th treatment.  Iodinated Contrast Media Rash 13 hr pre-medication prior to IV contrast.  
Patient has done well with 1 hr pre-medication of (Solu-Medrol) 40mg &  (Benadryl) 50mg.  Lipitor [Atorvastatin] Myalgia  Shellfish Containing Products Hives  Tape [Adhesive] Itching and Other (comments) \"op site-- clear,thin tape\"--caused blister  Tomato Hives  Iodine And Iodide Containing Products Rash  Olmesartan Other (comments)  Losartan Other (comments)  
  headaches  Percocet [Oxycodone-Acetaminophen] Other (comments) Fever; however, current home med OBJECTIVE Physical Exam 
Visit Vitals /76 (BP 1 Location: Right arm, BP Patient Position: Sitting) Pulse 97 Ht 5' 7.99\" (1.727 m) Wt 247 lb 12.8 oz (112.4 kg) LMP 09/07/2011 BMI 37.69 kg/m² Physical Exam: 
General: Alert and oriented. No acute distress. Well-nourished HEENT: No thyroid enlargment. Neck supple without restrictions. Sclera normal. Normal occular motion. Moist mucous membranes. Lymphatics: No evidence of axillary, cervical, or subclavicular adenopathy. Respiratory: clear to auscultation and percussion to the bases. No CVAT. Chest wall mass is still palpable along the superior sternum at the level of the superior aspect of the breasts, but improved. Normal overlying skin Cardiovascular: regular rate and rhythm. No murmurs, rubs, or gallops. Gastrointestinal: soft, non-tender, non-distended, no masses or organomegaly. Well-healed incision. Musculoskeletal: normal gait. No joint tenderness, deformity or swelling. No muscular tenderness. Extremities: extremities normal, atraumatic, mild 1+ edema bilaterally. Pelvic: deferred today Neuro: Grossly intact. Normal gait and movement. No acute deficit Skin: No evidence of rashes or skin changes. DATE REVIEW as available: 
Lab Results Component Value Date/Time WBC 4.7 09/17/2020 08:12 AM  
 Hemoglobin (POC) 10.5 (L) 03/20/2017 12:15 PM  
 HGB 7.4 (L) 09/17/2020 08:12 AM  
 Hematocrit (POC) 31 (L) 03/20/2017 12:15 PM  
 HCT 24.5 (L) 09/17/2020 08:12 AM  
 PLATELET 010 06/62/2583 08:12 AM  
 MCV 98.8 09/17/2020 08:12 AM  
 
Lab Results Component Value Date/Time  
 ABS. NEUTROPHILS 2.7 09/17/2020 08:12 AM  
 
Lab Results Component Value Date/Time Sodium 143 09/17/2020 08:12 AM  
 Potassium 4.3 09/17/2020 08:12 AM  
 Chloride 114 (H) 09/17/2020 08:12 AM  
 CO2 23 09/17/2020 08:12 AM  
 Anion gap 6 09/17/2020 08:12 AM  
 Glucose 131 (H) 09/17/2020 08:12 AM  
 BUN 30 (H) 09/17/2020 08:12 AM  
 Creatinine 1.31 (H) 09/17/2020 08:12 AM  
 BUN/Creatinine ratio 23 (H) 09/17/2020 08:12 AM  
 GFR est AA 50 (L) 09/17/2020 08:12 AM  
 GFR est non-AA 42 (L) 09/17/2020 08:12 AM  
 Calcium 8.9 09/17/2020 08:12 AM  
 Bilirubin, total 0.4 09/03/2020 08:10 AM  
 Alk. phosphatase 90 09/03/2020 08:10 AM  
 Protein, total 8.0 09/03/2020 08:10 AM  
 Albumin 3.2 (L) 09/03/2020 08:10 AM  
 Globulin 4.8 (H) 09/03/2020 08:10 AM  
 A-G Ratio 0.7 (L) 09/03/2020 08:10 AM  
 ALT (SGPT) 20 09/03/2020 08:10 AM  
 
Ca-125: 
6/11/2020: 268 
9/3/2020: 187 ECHO 7/17/18 Left ventricle: Systolic function was normal. Ejection fraction was estimated in the range of 55 % to 60 %. There were no regional wall motion abnormalities. Wall thickness was mildly increased. Left atrium: The atrium was mildly dilated. Mitral valve: There was mild regurgitation. Aortic valve: Leaflets exhibited sclerosis without stenosis. Not well visualized. IMPRESSION AND PLAN: 
Ms. Beatris Sicard. Kassy Arango is a 62 y.o. female with recurrent, metastatic ovarian cancer. Heavily pre-treated. Lost to follow-up since 9/2018, at which time patient self-discontinued weekly paclitaxel. Initiated [place] [facility] on 1/23/19. Held after 1 month secondary to rising creatinine. Found to have right hydronephrosis. Right ureteral stent placed 3/18/19 with normalization of creatinine. Completed 2700 cGy to the sternun 7/11/19. Restarted [place] at 100mg/daily on 4/23/19. Now with rising Ca-125 and progression on CT 6/24/2020. Initiated on weekly Taxol 80mg/m2 days 1, 8, and 15 in a 28-day cycle on 8/6/2020. Problem List Items Addressed This Visit Circulatory Acute deep vein thrombosis (DVT) of proximal vein of right lower extremity ([address] Relevant Medications  
 apixaban (Eliquis) 5 mg tablet Endocrine Controlled type 2 diabetes mellitus without complication, with long-term current use of insulin ([address] Relevant Medications  
 rosuvastatin (CRESTOR) 10 mg tablet Ovarian cancer ([address] - Primary Relevant Medications  
 traMADoL (ULTRAM) 50 mg tablet  
 rosuvastatin (CRESTOR) 10 mg tablet Hypothyroidism Relevant Medications  
 rosuvastatin (CRESTOR) 10 mg tablet Immune Lymphadenopathy, mediastinal  
  
 Respiratory SHABANA on CPAP Other Carcinomatosis ([address] Relevant Medications  
 traMADoL (ULTRAM) 50 mg tablet Morbid obesity ([address] Mass of chest wall S/P ureteral stent placement Lymphedema of both lower extremities Reviewed patient's course to date. Presents today for consideration of cycle 4 weekly paclitaxel and after recent hospital discharge.  Recent RLE DVT diagnosed at [facility] last week. Started on Eliquis. Patient to remain on Eliquis indefinitely given her active ongoing treatment of her cancer. Tolerated cycle 1-3 well. Her Ca-125 is responding to treatment after 2 cycles. Will follow-up pre-chemo labs. Will plan for repeat imaging after cycle 4. Would plan to treat until progression, or may consider maintenance PARPi after 6 cycles with either olaparib or rucaparib. Urology is managing her stent and recurrent UTIs. Has not required antibiotics for some time. Plan for stent exchange on 10/30/2020 per patient. The patient has underlying diabetes and neuropathy. She has follow-up next week regarding numbness in the bottoms of her feet. I expressed my concerns that we will need to watch this closely while on weekly Taxol, as this may certainly worsen her diabetic neuropathy. All questions and concerns were addressed with the patient and she is comfortable with the plan.  
 
Jitendra Brink MD

## 2020-10-08 NOTE — PROGRESS NOTES
[address] [facility], [address] [place], [address] 
P [phone]  F [phone] Office Visit Patient ID: 
Name: Li Anderson MRN: [id number] : 1960/60 y.o. Visit date: 10/8/2020 LEONID Nieves is a 62 y.o.  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 The patient's previous treatment include primary debulking and adjuvant Taxol/carbo with retreatment in  for recurrence. Subsequent lines of chemo as outlined below. She has most recently started single agent weekly Taxol on 18 following progression with chest wall involvement while on PARPi. Most recent EOD imaging in 2020 shows a variable response to her prior therapy though with mostly increased disease including liver, mediastium and abd with progressive right hydronephrosis. She is s/p stent placement.   
   
OncTx History: 
12: MARAH/BSO/Cytoreductive surgery on 12 noted carcinomatosis/omental caking. 10/2012-2013: 6 cycles Carbo/Taxol 2014-2014: 6 Cycles carbo/Taxol 3/2015-5/13/15: 3 cycles Avastin/Doxil until progression 2015-2015 Weekly Topotecan  
2015-3/2016: Weekly Topotecan 2017-2017Zetta Boast D1/D8 Q28 days for 6 cycles 2018: CT core needle bx chest wall mass: Metastatic high-grade serous carcinoma (see comment) Positive for malignancy. 2018-2018: Taxol weekly 2019: 10 fx 30cGy palliative RT to sternal lesion 2019 - 2020 Zejula. Underwent right ureteral stent placement on 3/18/19. CT C/A/P 2020 with mixed response, but overall progression. Rising Ca-125 now 268. 
2020: CT CAP variable response to therapy with several lesions decreased in size. However a majority of the lesions have increased in size consistent with progression of disease with involvement of the mediastinum, liver masses, and peritoneal carcinomatosis.  Increase right-sided hydronephrosis. Conchetta Peaks 8/2020 - initiated Taxol weekly CT Results (most recent): 
Results from MALENA PÉREZ KARIN [facility] Encounter encounter on 06/24/20 CT ABD PELV W CONT Narrative EXAM: CT CHEST W CONT, CT ABD PELV W CONT INDICATION: Ovarian cancer COMPARISON: 10/2/2019 CONTRAST: 100 mL of Isovue-370. TECHNIQUE:  
Following the uneventful intravenous administration of contrast, thin axial 
images were obtained through the chest, abdomen and pelvis. Coronal and sagittal 
reconstructions were generated. Oral contrast was administered. CT dose 
reduction was achieved through use of a standardized protocol tailored for this 
examination and automatic exposure control for dose modulation. FINDINGS:  
 
THYROID: No nodule. MEDIASTINUM: Left paratracheal adenopathy now measures 3.7 x 2.0 cm increased in 
size. Mass in AP window which extends superiorly into the left infraclavicular 
region and inferiorly into the left hilum is also increase in size. When 
measured in a similar fashion this measures 3.5 x 2.4 previously 3.0 x 2.9 cm. Right parasternal mass is decreased in size measuring 3.4 x 3.4 cm. Subcarinal 
adenopathy measures 2.8 x 2.3 cm increased in size. There is increased size of 
the nodular component adjacent to the xiphoid now measuring 2.6 x 3.3 cm. SWATI: Left hilar lymph node measures 3.2 x 2.4 cm previously 2.5 x 1.8 cm. THORACIC AORTA: No dissection or aneurysm. MAIN PULMONARY ARTERY: Normal in caliber. TRACHEA/BRONCHI: Patent. ESOPHAGUS: No wall thickening or dilatation. HEART: Normal in size. PLEURA: No effusion or pneumothorax. LUNGS: No nodule, mass, or airspace disease. LIVER: Multiple masses are noted and better visualized on current study 
secondary to the addition of intravenous contrast. Comparison lesions measure 
previously, there is a 3.5 x 2.8 cm mass in the right hepatic lobe series 3 
image 58 slightly increased in size. There is a 3.6 x 2.9 cm mass in the left hepatic lobe slightly decreased in size series 3 image 56. Left hepatic lobe 
mass series 3 image 45 measuring 4.0 x 2.8 cm slightly increased in size. There 
is extensive tumor infiltration in the liver hilum with narrowing of the main 
portal vein as well as the intrahepatic portal veins. The common hepatic artery 
is not well visualized and likely occluded with some collateral flow noted in 
the liver hilum. GALLBLADDER: Not visualized SPLEEN: Mass in the splenic hilum increased in size measuring 7.4 x 4.3 cm with 
invasion into the splenic hilum. PANCREAS: The pancreas is encased by tumor. ADRENALS: There is likely involvement of the left adrenal gland tumor. KIDNEYS: Right nephroureteral stent with mild to moderate right-sided 
hydronephrosis. STOMACH: Tumor encasing the gastric antrum. SMALL BOWEL: No dilatation or wall thickening. COLON: Tumor abutting the tumor encasing the rectosigmoid junction. Tumor 
abutting the transverse colon. APPENDIX: Unremarkable. PERITONEUM: Some free fluid noted in the pelvis. Increased size of mesenteric 
masses. Representative examples: 7.8 x 4.3 cm tumor anterior peritoneum series 3 
image 84. No pelvic tumor 7.5 x 4.8 cm series 3 image 100. RETROPERITONEUM:  
Right pelvic sidewall increased in size. Large right lateral lymph node 
measuring 3.1 x 2.0 cm not significantly changed. REPRODUCTIVE ORGANS: The visualized URINARY BLADDER: No mass or calculus. BONES: No destructive bone lesion. ADDITIONAL COMMENTS: N/A Impression IMPRESSION: 
1. There is some variable response to therapy with several lesions decreased in 
size. However a majority of the lesions have increased in size consistent with 
progression of disease with involvement of the mediastinum, liver masses, and 
peritoneal carcinomatosis. 2.  Increase right-sided hydronephrosis. Ricka Distad CT 10/2019: 
FINDINGS: 
THYROID: Visualized gland is unremarkable. MEDIASTINUM: Upper left paratracheal node measures 2.2 x 3.4 cm (2, 8), 
previously 2.3 x 3.5 cm. Superior prevascular node measures 2.9 x 3.0 cm, 
previously 3.5 x 3.8 cm. Poorly delineated subcarinal node measures about 3.4 x 
4.8 cm, previously 3.6 x 4.8 cm. Precardiac nodes measure 1.9 x 2.5 cm and 1.7 x 
2.4 cm (2, 39), previously 2.1 x 2.7 cm and 2.0 x 2.0 cm. SWATI: No mass or lymphadenopathy. THORACIC AORTA: No dissection or aneurysm. MAIN PULMONARY ARTERY: Normal in caliber. TRACHEA/BRONCHI: Patent. ESOPHAGUS: No wall thickening or dilatation. HEART: Normal in size. PLEURA: No effusion or pneumothorax. LUNGS: No nodule, mass, or airspace disease. LIVER: Multiple hypodense lesions suspicious for metastases, measuring up to 2.5 
x 4.0 cm in segment 2 (2, 43) series, previously 2.8 x 3.7 cm, 3.2 x 4.6 cm in 
segment 3 (2, 55), previously 3.1 x 4.5 cm, and partially exophytic from the 
posterior inferior right lobe segment 6 measuring 2.5 x 3.5 cm, previously 2.8 x 
3.4 cm. GALLBLADDER: Unremarkable. SPLEEN: Central 1.9 x 3.1 cm hypodensity (2, 47), previously 1.9 x 2.3 cm. Otherwise unremarkable. PANCREAS: Inseparable from bulky central abdominal adenopathy with no identified 
ductal dilation. ADRENALS: Unremarkable. KIDNEYS: Right hydronephrosis has improved status post right nephroureteral 
stent placement. Kidneys otherwise appear unremarkable. STOMACH: Unremarkable. SMALL BOWEL: No dilatation or wall thickening. COLON: No dilation or wall thickening. APPENDIX: Unremarkable. PERITONEUM: Extensive peritoneal implants with representative largest lesions 
measuring 4.4 x 6.1 cm and the left adnexal region (2, 98), previously 4.3 x 5.7 
cm, 3.8 x 5.9 cm in the anterior midline (2, 81), previously 3.4 x 5.4 cm, and 
2.8 x 4.7 cm in the right abdomen (2, 78), previously 3.2 x 4.7 cm. RETROPERITONEUM: Extensive rocio hepatis, retroperitoneal, and pelvic sidewall adenopathy. Conglomerate nkechi mass in the rocio hepatis region measures 5.7 x 
11.1 cm (2, 58) is, previously 6.3 x 12.2 cm. Aortocaval node measures 3.7 x 5.3 
cm (2, 77), previously 4.5 x 5.4 cm. Right pelvic sidewall node measures 2.9 x 
4.2 cm (2, 103), previously 3.4 x 4.2 cm. Left external iliac node measures 3.0 
x 4.8 cm (2, 101), previously 3.3 x 4.8 cm. REPRODUCTIVE ORGANS: Uterus and ovaries are surgically absent. URINARY BLADDER: No mass or calculus. Distal end of right nephroureteral stent 
positioned within bladder lumen. BONES: Degenerative spine change. No acute fracture or aggressive lesion. ADDITIONAL COMMENTS: Mass centered at the right sternocostal junction measures 5.3 x 5.4 cm (2, 21), previously 4.8 x 5.7 cm. Post surgical changes in the 
anterior abdominal wall. Left Port-A-Cath in place. Bilateral inguinal 
adenopathy measuring 2.1 x 3.2 cm on the right (2, 115), previously 2.3 x 3.7 cm 
and 2.7 x 2.8 cm on the left (2, 108), previously 2.9 x 3.1 cm. . 
  
IMPRESSION: Overall response to disease with either decrease in size or no 
significant change in extensive nkechi metastases, hepatic metastases, right 
sternal metastasis, and peritoneal carcinomatosis as above. No new metastasis or 
progressive metastasis identified. Status post right nephroureteral stent with 
interval resolution of right hydronephrosis. SUBJECTIVE: 
Presents to [facility]. Recently discharged from [facility] for clot extending over right LE venous sytem. No clot found left proximal veins. She has started on [address] She reports mild right ankle/leg and knee discomfort, aided by tramadol and propping up. She stopped her Norco.  She denies any abd/chest/back pain currently. She did develop a small pressure sore on the lower back with [id number] [place] [address] evaluating regularly and home therapy. She ambulates at home with rollator. Goes up/down stairs slowly with assistance getting SOB. No weakness/light headedness. No falls.   No N/V. Appetite stable. She drinks 4 bottles water/day with gatorade. +BM daily to every other day with miralax. Previously seen by her urologist, plans for stent change this month. She did self-increase her diuretic d/t swelling associated with her clot. She remains mostly independent, OOB most of day. She reports completes chores and all self care and some light exercising. They are thinking of adding on to their home for a downstairs room. Two adult sons at home who cook for her. She is also fostering two boys 15 and 12. The older is moving out in October to reunite with his mother. She is planning to adopt the younger. Confirms if she passes, her son Satish will adopt him. She has discussed this with the adoption agency. She has chronic pedal/hand numbness/tingling due to taxol and DM. She wakes from sleep occasionally d/t this but resumes sleep easily. Follows with podiatrist. Denies any change in depression, continues medication. ROS 10 pt negative except as per above PMH: 
Past Medical History:  
Diagnosis Date  Adverse effect of anesthesia   
 sleep apnea cpap machine useage  Anemia  Arthritis DDD low back and knees  Asthma   
 uses albuterol prn only; none x 6 mos. Never hospitalized  BRCA negative 1/2013  Calculus of kidney  Chronic kidney disease   
 kidney stones  Chronic pain   
 knee pain bilat  CINV (chemotherapy-induced nausea and vomiting) 8/21/2014  Decreased hearing, right PATIENT STATES THIS IS DUE TO CHEMOTHERAPY  Diabetes ([facility] [address] Age 46 IDDM  Environmental allergies  Fibromyalgia  GERD (gastroesophageal reflux disease)   
 controlled with med  Hx of pulmonary embolus during pregnancy 1/18/2015  Hydronephrosis due to obstruction of ureter 3/18/2019  Hypertension  Ill-defined condition Sepsis 9-2015 -  SOURCE PORT  
 Ill-defined condition 2016  
 chemotherapy  Mass of chest wall 05/2018  Morbid obesity ([facility] [address]  Murmur, heart  Other and unspecified hyperlipidemia  [facility] 2012, 2014  
 serous, high grade, stage IIIc. s/p chemotx (has port)  Psychiatric disorder Depression/Anxiety  Rectal bleeding  Thromboembolus ([facility] [address]  POST PARTUM; resolved- PELVIS  Thyroid disease HYPOTHYROID  Unspecified sleep apnea CPAP, Dr. Khadijah Mcpherson PSH: 
Past Surgical History:  
Procedure Laterality Date  BREAST SURGERY PROCEDURE UNLISTED Lymph node in left breast 2017 [place]  
  X2  
 HX HYSTERECTOMY  2012 ROULA/BSO, tumor debulking, omentectomy for ovarian cancer  HX ORTHOPAEDIC Right   
 ankle-FX, R  
 HX ORTHOPAEDIC Right   
 FX R ARM  
 HX UROLOGICAL Right  STENT FOR KIDNEY STONES  
 HX VASCULAR ACCESS  2015  
 right chest- port placed  HX VASCULAR ACCESS Left  TETE CATH  
 
SOC: 
Social History Socioeconomic History  Marital status:  Spouse name: Not on file  Number of children: Not on file  Years of education: Not on file  Highest education level: Not on file Occupational History  Not on file Social Needs  Financial resource strain: Not on file  Food insecurity Worry: Not on file Inability: Not on file  Transportation needs Medical: Not on file Non-medical: Not on file Tobacco Use  Smoking status: Never Smoker  Smokeless tobacco: Never Used Substance and Sexual Activity  Alcohol use: Yes Comment: 1 drink per month  Drug use: No  
 Sexual activity: Yes Lifestyle  Physical activity Days per week: Not on file Minutes per session: Not on file  Stress: Not on file Relationships  Social connections Talks on phone: Not on file Gets together: Not on file Attends [employer] service: Not on file Active member of club or organization: Not on file Attends meetings of clubs or organizations: Not on file Relationship status: Not on file  Intimate partner violence Fear of current or ex partner: Not on file Emotionally abused: Not on file Physically abused: Not on file Forced sexual activity: Not on file Other Topics Concern  Not on file Social History Narrative Lives in [facility] alone.  passed away in 5/16 of a heart attack. Has 2 sons. Disability. 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 Enjoys swimming and going to the gym. Family History Family History Problem Relation Age of Onset  Hypertension Mother Zadie Bud Arthritis-osteo Mother  Hypertension Father  Arthritis-osteo Father  Cancer Father PROSTATE  COPD Father  Hypertension Brother  Elevated Lipids Brother  Arthritis-osteo Brother  Hypertension Brother  Elevated Lipids Brother  Obesity Brother  Cancer Brother PROSTATE  Anesth Problems Son DELAYED AWAKENING  
 Diabetes Maternal Grandmother  Anesth Problems Paternal Grandmother PATIENT STATES GRANDMOTHER'S HEART STOPPED DURING SURGERY Medications: (reviewed) Current Outpatient Medications on File Prior to Encounter Medication Sig Dispense Refill  omega-3 acid ethyl esters (LOVAZA) 1 gram capsule TAKE 1 CAPSULE BY MOUTH TWICE A  Cap 2  
 traMADoL (ULTRAM) 50 mg tablet Take 50 mg by mouth every six (6) hours as needed for Pain.  apixaban (Eliquis) 5 mg tablet Take 5 mg by mouth two (2) times a day.  rosuvastatin (CRESTOR) 10 mg tablet Take 10 mg by mouth nightly.  doxazosin (CARDURA) 1 mg tablet Take  by mouth daily.  amoxicillin-clavulanate (AUGMENTIN) 875-125 mg per tablet  doxazosin (CARDURA) 1 mg tablet Take 1 mg by mouth daily.  SULFAMETHOXAZOLE-TRIMETHOPRIM PO Take  by mouth two (2) times a day. 7 days  dexAMETHasone (DECADRON) 4 mg tablet Take 2 tablets with breakfast the day before chemo and for 2 days after chemo 36 Tab 2  
 lidocaine-prilocaine (EMLA) topical cream Apply small amount over port area and cover with band aid one hour before chemo 30 g 3  
 ondansetron (ZOFRAN ODT) 4 mg disintegrating tablet Take 1 Tab by mouth every eight (8) hours as needed for Nausea. Indications: nausea and vomiting caused by cancer drugs 30 Tab 2  predniSONE (DELTASONE) 50 mg tablet Take 1 tablet 13 hours prior to scan, take 1 tablet 7 hours prior to scan and take 1 tablet one hour prior to scan 3 Tab 0  Zejula 100 mg cap Take 100 mg by mouth daily. 60 Cap 4  
 fluticasone propionate (FLONASE) 50 mcg/actuation nasal spray 2 SPRAYS IN BOTH NOSTRILS DAILY 1 Bottle 0  
 cycloSPORINE (RESTASIS) 0.05 % dpet  albuterol (PROVENTIL VENTOLIN) 2.5 mg /3 mL (0.083 %) nebu Take 2.5 mg by inhalation.  dextroamphetamine-amphetamine (ADDERALL) 20 mg tablet TAKE 1 TABLET BY MOUTH EVERY DAY  0  
 rosuvastatin (CRESTOR) 10 mg tablet TAKE 1 TABLET BY MOUTH EVERY DAY  0  
 telmisartan (MICARDIS) 20 mg tablet TAKE 1 TABLET BY MOUTH EVERY DAY 90 Tab 3  
 atorvastatin (LIPITOR) 20 mg tablet  BD ULTRA-FINE SHORT PEN NEEDLE 31 gauge x 5/16\" ndle USE TO INJECT INSULIN 5 TIMES DAILY 200 Pen Needle 11  
 insulin aspart U-100 (NOVOLOG FLEXPEN U-100 INSULIN) 100 unit/mL (3 mL) inpn INJECT 20 UNITS BEFORE EACH MEAL + 4 UNITS FOR EVERY 50 MG/DL ABOVE 150 MG/DL.  UNITS PER DAY 90 Adjustable Dose Pre-filled Pen Syringe 3  
 LEVEMIR FLEXTOUCH U-100 INSULN 100 unit/mL (3 mL) inpn INJECT 30 UNITS IN AM AND 50 UNITS AT NIGHT. INCREASE AS DIRECTED TO  UNITS/DAY. 90 Adjustable Dose Pre-filled Pen Syringe 3  DEXILANT 60 mg CpDB capsule (delayed release) TAKE 1 CAPSULE BY MOUTH EVERY DAY 90 Cap 3  
 LINZESS 145 mcg cap capsule TAKE 1 CAPSULE BY MOUTH EVERY DAYY 30 Cap 0  
  levothyroxine (SYNTHROID) 150 mcg tablet TAKE 1 TABLET BY MOUTH EVERY DAY BEFORE BREAKFAST 90 Tab 0  
 EPINEPHrine (EPIPEN) 0.3 mg/0.3 mL injection INJECT 0.3 ML BY INTRAMUSCULAR ROUTE ONCE AS NEEDED FOR UP TO 1 DOSE.  1  
 glucose blood VI test strips (TRUE METRIX GLUCOSE TEST STRIP) strip by Does Not Apply route See Admin Instructions. 30 Strip 5  
 lancets misc by Does Not Apply route daily. True Metrix lancets- test blood sugar once per day 30 Each 5  Blood-Glucose Meter monitoring kit Check up to tid, as directed 1 Kit 0  
 azelastine (ASTELIN) 137 mcg (0.1 %) nasal spray 1 [place] by Both Nostrils route daily as needed. Use in each nostril as directed  insulin detemir U-100 (LEVEMIR) 100 unit/mL injection 20 Units by SubCUTAneous route daily (with lunch).  montelukast (SINGULAIR) 10 mg tablet Take 10 mg by mouth nightly.  ascorbic acid, vitamin C, (VITAMIN C) 1,000 mg tablet Take 1,000 mg by mouth daily.  albuterol (PROVENTIL VENTOLIN) 2.5 mg /3 mL (0.083 %) nebulizer solution 2.5 mg by Nebulization route every four (4) hours as needed for Wheezing.  Liraglutide (VICTOZA) 0.6 mg/0.1 mL (18 mg/3 mL) pnij 1.8 mg by SubCUTAneous route daily (with lunch). 3 Pen 3  
 SYMBICORT 160-4.5 mcg/actuation HFAA Take 2 Puffs by inhalation two (2) times daily as needed. 2 Inhaler 3  clonazePAM (KLONOPIN) 0.5 mg tablet Take 0.5 mg by mouth nightly as needed.  sertraline (ZOLOFT) 50 mg tablet TAKE 1 TABLET BY MOUTH as needed  1  cholecalciferol, VITAMIN D3, (VITAMIN D3) 5,000 unit tab tablet Take 5,000 Units by mouth daily.  furosemide (LASIX) 40 mg tablet Take 40 mg by mouth every other day. 3 No current facility-administered medications on file prior to encounter. Allergies: (reviewed) Allergies Allergen Reactions  Carboplatin Other (comments)   Patient developed shortness of breath, felt faint, coughing, skin flushed and \"itchy\". This occurred on the patients 8th treatment.  Iodinated Contrast Media Rash 13 hr pre-medication prior to IV contrast.  
Patient has done well with 1 hr pre-medication of (Solu-Medrol) 40mg &  (Benadryl) 50mg.  Lipitor [Atorvastatin] Myalgia  Shellfish Containing Products Hives  Tape [Adhesive] Itching and Other (comments) \"op site-- clear,thin tape\"--caused blister  Tomato Hives  Iodine And Iodide Containing Products Rash  Olmesartan Other (comments)  Losartan Other (comments)  
  headaches  Percocet [Oxycodone-Acetaminophen] Other (comments) Fever; however, current home med OBJECTIVE Physical Exam 
Visit Vitals Temp 97.7 °F (36.5 °C) Resp 18 Ht 5' 8\" (1.727 m) Wt 247 lb (112 kg) LMP 09/07/2011 Breastfeeding No  
BMI 37.56 kg/m² GENERAL BRIAN: Conversant, pleasant, alert, oriented. NAD HEENT: Oropharynx clear. Sclera anicteric. No JVD. No adenopathy RESPIRATORY: Lung CTA, No rales/rhonchi. Adequate respiratory effort. CARDIOVASC: Reg rate/rhythm, stable murmur low grade. No visible chest lesion. GASTROINT: soft, non-tender, without masses or organomegaly, no fluid wave. Habitus limits exam.  
EXTREMITIES: bilat 1+ edema/girth nonpitting. Tender right lower leg to palp. ALCON SURVEY: Negative NEURO: Grossly intact, no acute deficit. Oriented. Not depressed. Not agitated. Wt Readings from Last 3 Encounters:  
10/08/20 247 lb (112 kg) 10/06/20 247 lb 12.8 oz (112.4 kg) 09/22/20 258 lb (117 kg) DATE REVIEW as available: 
Lab Results Component Value Date/Time WBC 8.3 10/08/2020 08:11 AM  
 Hemoglobin (POC) 10.5 (L) 03/20/2017 12:15 PM  
 HGB 7.8 (L) 10/08/2020 08:11 AM  
 Hematocrit (POC) 31 (L) 03/20/2017 12:15 PM  
 HCT 25.4 (L) 10/08/2020 08:11 AM  
 PLATELET 322 83/31/3290 08:11 AM  
 MCV 92.7 10/08/2020 08:11 AM  
 
Lab Results Component Value Date/Time ABS. NEUTROPHILS 5.7 10/08/2020 08:11 AM  
 
Lab Results Component Value Date/Time Sodium 139 10/08/2020 08:11 AM  
 Potassium 4.5 10/08/2020 08:11 AM  
 Chloride 107 10/08/2020 08:11 AM  
 CO2 25 10/08/2020 08:11 AM  
 Anion gap 7 10/08/2020 08:11 AM  
 Glucose 110 (H) 10/08/2020 08:11 AM  
 BUN 27 (H) 10/08/2020 08:11 AM  
 Creatinine 1.39 (H) 10/08/2020 08:11 AM  
 BUN/Creatinine ratio 19 10/08/2020 08:11 AM  
 GFR est AA 47 (L) 10/08/2020 08:11 AM  
 GFR est non-AA 39 (L) 10/08/2020 08:11 AM  
 Calcium 9.1 10/08/2020 08:11 AM  
 Bilirubin, total 0.7 10/08/2020 08:11 AM  
 Alk. phosphatase 89 10/08/2020 08:11 AM  
 Protein, total 7.8 10/08/2020 08:11 AM  
 Albumin 3.1 (L) 10/08/2020 08:11 AM  
 Globulin 4.7 (H) 10/08/2020 08:11 AM  
 A-G Ratio 0.7 (L) 10/08/2020 08:11 AM  
 ALT (SGPT) 20 10/08/2020 08:11 AM  
 
CA-125 Date Value Ref Range Status 09/03/2020 187 (H) 1.5 - 35.0 U/mL Final  
  Comment:  
  ** Note new reference range and method ** Results may vary depending on . Method used is MediaBoost 
  
06/11/2020 268 (H) 1.5 - 35.0 U/mL Final  
  Comment:  
  ** Note new reference range and method ** Results may vary depending on . Method used is MediaBoost 
  
05/14/2020 233 (H) 1.5 - 35.0 U/mL Final  
  Comment:  
  ** Note new reference range and method ** Results may vary depending on . Method used is MediaBoost 
  
03/19/2020 226 (H) 1.5 - 35.0 U/mL Final  
  Comment:  
  ** Note new reference range and method ** Results may vary depending on . Method used is MediaBoost 
  
02/20/2020 220 (H) 1.5 - 35.0 U/mL Final  
  Comment:  
  ** Note new reference range and method ** Results may vary depending on . Method used is MediaBoost ECHO 1/2020 · Normal cavity size and systolic function (ejection fraction normal). Mild concentric hypertrophy. Estimated left ventricular ejection fraction is 55 - 60%. Visually measured ejection fraction. No regional wall motion abnormality noted. · Mildly dilated left atrium. · Mild aortic valve sclerosis. · Trace mitral valve regurgitation is present. IMPRESSION AND PLAN: 
62 y.o. with diffusely metastatic ovarian cancer. Continues on palliative chemo, retrial taxol C3- Tolerated previous cycle. CKD stage 2-3 - improved s/p transfusion. Continue home hydration. Decrease diuretic to prescribed dosing. Right hydronephrosis - stent in place. Exchange Oct 
Liver mets -  asx Chronic pain - well controlled/improved. Currently of Norco. Stool softeners Anemia - persistent d/t multiple causes, chemo hx. Consider EPO 
SOB/debility - hx Asthma, controlled on meds. Consider stimulants PRN. Consider home bed d/t stairs. Discussed concerns with her. Home PT for now. IDDM2/SHABANA/depression - controlled overall. Continue CPAP 
CIPN/diabetic polyneuropathy - not in pain, monitor. Discussed home safety. Wt loss, prot smiley malnutrition - supplements discussed Psychosocial - she has some fear about the future. She is well supported, isaac remains very important. Monitor depressive sx mgmt. Discussed home life and plans for the future. Plans for adopting her foster child with support of her son Satish. Mirza Torres PA-C 
10/8/2020

## 2020-10-08 NOTE — PROGRESS NOTES
[facility] Chemotherapy Progress Note Date: 2020 Name: Emma Briceno MRN: [id number] : 1960 
 
 
0810 Pt admit to [facility] for Taxol ambulatory in stable condition. Assessment completed. No new concerns voiced. Port with positive blood return. Chemotherapy Flowsheet 10/8/2020 Cycle C3D1 Date 10/8/2020 Drug / Regimen Taxol Dosage -  
Pre Hydration -  
Pre Meds given Notes given Ms. Moore's vitals were reviewed. Patient Vitals for the past 12 hrs: 
 Temp Pulse Resp BP  
10/08/20 1220  80  (!) 145/77  
10/08/20 0809 97.7 °F (36.5 °C) 83 18 (!) 146/79 Lab results were obtained and reviewed. Recent Results (from the past 12 hour(s)) CBC WITH AUTOMATED DIFF Collection Time: 10/08/20  8:11 AM  
Result Value Ref Range WBC 8.3 3.6 - 11.0 K/uL  
 RBC 2.74 (L) 3.80 - 5.20 M/uL HGB 7.8 (L) 11.5 - 16.0 g/dL HCT 25.4 (L) 35.0 - 47.0 % MCV 92.7 80.0 - 99.0 FL  
 MCH 28.5 26.0 - 34.0 PG  
 MCHC 30.7 30.0 - 36.5 g/dL  
 RDW 17.1 (H) 11.5 - 14.5 % PLATELET 120 693 - 069 K/uL MPV 9.1 8.9 - 12.9 FL  
 NRBC 0.0 0  WBC ABSOLUTE NRBC 0.00 0.00 - 0.01 K/uL NEUTROPHILS 68 32 - 75 % LYMPHOCYTES 17 12 - 49 % MONOCYTES 10 5 - 13 % EOSINOPHILS 3 0 - 7 % BASOPHILS 1 0 - 1 % IMMATURE GRANULOCYTES 1 (H) 0.0 - 0.5 % ABS. NEUTROPHILS 5.7 1.8 - 8.0 K/UL  
 ABS. LYMPHOCYTES 1.4 0.8 - 3.5 K/UL  
 ABS. MONOCYTES 0.8 0.0 - 1.0 K/UL  
 ABS. EOSINOPHILS 0.3 0.0 - 0.4 K/UL  
 ABS. BASOPHILS 0.0 0.0 - 0.1 K/UL  
 ABS. IMM. GRANS. 0.1 (H) 0.00 - 0.04 K/UL  
 DF AUTOMATED METABOLIC PANEL, COMPREHENSIVE Collection Time: 10/08/20  8:11 AM  
Result Value Ref Range Sodium 139 136 - 145 mmol/L Potassium 4.5 3.5 - 5.1 mmol/L Chloride 107 97 - 108 mmol/L  
 CO2 25 21 - 32 mmol/L Anion gap 7 5 - 15 mmol/L Glucose 110 (H) 65 - 100 mg/dL BUN 27 (H) 6 - 20 MG/DL  Creatinine 1.39 (H) 0.55 - 1.02 MG/DL  
 BUN/Creatinine ratio 19 12 - 20 GFR est AA 47 (L) >60 ml/min/1.73m2 GFR est non-AA 39 (L) >60 ml/min/1.73m2 Calcium 9.1 8.5 - 10.1 MG/DL Bilirubin, total 0.7 0.2 - 1.0 MG/DL  
 ALT (SGPT) 20 12 - 78 U/L  
 AST (SGOT) 26 15 - 37 U/L Alk. phosphatase 89 45 - 117 U/L Protein, total 7.8 6.4 - 8.2 g/dL Albumin 3.1 (L) 3.5 - 5.0 g/dL Globulin 4.7 (H) 2.0 - 4.0 g/dL A-G Ratio 0.7 (L) 1.1 - 2.2 MAGNESIUM Collection Time: 10/08/20  8:11 AM  
Result Value Ref Range Magnesium 1.8 1.6 - 2.4 mg/dL CANCER ANTIGEN 125 Collection Time: 10/08/20  8:11 AM  
Result Value Ref Range CA-125 116 (H) 1.5 - 35.0 U/mL Pre-medications  were administered as ordered and chemotherapy was initiated. Medications Administered 0.9% sodium chloride infusion Admin Date 10/08/2020 Action New Bag Dose 25 mL/hr Rate 25 mL/hr Route IntraVENous Administered By 
Reina Monroe RN  
  
  
 dexamethasone (DECADRON) 12 mg in 0.9% sodium chloride 50 mL, overfill volume 5 mL IVPB Admin Date 10/08/2020 Action Given Dose 
12 mg Rate 232 mL/hr Route IntraVENous Administered By 
Reina Monroe RN  
  
  
 diphenhydrAMINE (BENADRYL) injection 50 mg   
 Admin Date 10/08/2020 Action Given Dose 50 mg Route IntraVENous Administered By 
Reina Monroe RN  
  
  
 famotidine (PF) (PEPCID) 20 mg in 0.9% sodium chloride 10 mL injection Admin Date 10/08/2020 Action Given Dose 
20 mg Route IntraVENous Administered By 
Reina Monroe RN  
  
  
 PACLitaxeL (TAXOL) 197 mg in 0.9% sodium chloride 250 mL, overfill volume 25 mL chemo infusion Admin Date 10/08/2020 Action New Bag Dose 
197 mg Rate 
307.8 mL/hr Route IntraVENous Administered By 
Reina Monroe RN  
  
  
 palonosetron HCl (ALOXI) injection 0.25 mg   
 Admin Date 10/08/2020 Action Given Dose 0.25 mg Route IntraVENous Administered By 
Reina Monroe RN  
  
  
  
 
 
 1220 Pt tolerated treatment well. Port maintained positive blood return throughout treatment. Flushed, heparinized and de-accessed per protocol. D/c home ambulatory in no distress. Future Appointments Date Time Provider Gabriel Phillip 10/15/2020  8:00 AM A1 [facility] [address] [place] [address]  
10/22/2020  8:00 AM A1 [facility] [address] [place] [address]  
10/28/2020 10:30 AM Lenka De La Cruz MD [facility]  
11/5/2020  8:00 AM A1 [facility] [address] [place] [address]  
11/12/2020  8:00 AM A1 [facility] [place] [facility]   
11/19/2020  8:00 AM A1 [facility] [place] [facility]  Any Dunlap RN October 8, 2020

## 2020-10-15 NOTE — PROGRESS NOTES
[facility] Chemotherapy Progress Note    Date: October 15, 2020    Name: Virgilio Reynolds. [facility]    MRN: [id number]         : 1960      0800 Pt admit to [facility] for Taxol ambulatory in stable condition. Assessment completed. No new concerns voiced. Port with positive blood return. Chemotherapy Flowsheet 10/15/2020   Cycle C3D8   Date 10/15/2020   Drug / Regimen Taxol   Dosage -   Pre Hydration -   Pre Meds given   Notes given         Ms. Moore's vitals were reviewed. Patient Vitals for the past 12 hrs:   Temp Pulse Resp BP   10/15/20 0809 96.9 °F (36.1 °C) 82 18 (!) 150/77         Lab results were obtained and reviewed. Recent Results (from the past 12 hour(s))   CBC WITH AUTOMATED DIFF    Collection Time: 10/15/20  8:16 AM   Result Value Ref Range    WBC 8.1 3.6 - 11.0 K/uL    RBC 2.55 (L) 3.80 - 5.20 M/uL    HGB 7.4 (L) 11.5 - 16.0 g/dL    HCT 24.0 (L) 35.0 - 47.0 %    MCV 94.1 80.0 - 99.0 FL    MCH 29.0 26.0 - 34.0 PG    MCHC 30.8 30.0 - 36.5 g/dL    RDW 17.4 (H) 11.5 - 14.5 %    PLATELET 761 760 - 655 K/uL    MPV 9.3 8.9 - 12.9 FL    NRBC 0.0 0  WBC    ABSOLUTE NRBC 0.00 0.00 - 0.01 K/uL    NEUTROPHILS 67 32 - 75 %    LYMPHOCYTES 18 12 - 49 %    MONOCYTES 8 5 - 13 %    EOSINOPHILS 4 0 - 7 %    BASOPHILS 1 0 - 1 %    IMMATURE GRANULOCYTES 2 (H) 0.0 - 0.5 %    ABS. NEUTROPHILS 5.4 1.8 - 8.0 K/UL    ABS. LYMPHOCYTES 1.4 0.8 - 3.5 K/UL    ABS. MONOCYTES 0.7 0.0 - 1.0 K/UL    ABS. EOSINOPHILS 0.3 0.0 - 0.4 K/UL    ABS. BASOPHILS 0.1 0.0 - 0.1 K/UL    ABS. IMM.  GRANS. 0.2 (H) 0.00 - 0.04 K/UL    DF AUTOMATED     METABOLIC PANEL, BASIC    Collection Time: 10/15/20  8:16 AM   Result Value Ref Range    Sodium 141 136 - 145 mmol/L    Potassium 4.4 3.5 - 5.1 mmol/L    Chloride 109 (H) 97 - 108 mmol/L    CO2 25 21 - 32 mmol/L    Anion gap 7 5 - 15 mmol/L    Glucose 111 (H) 65 - 100 mg/dL    BUN 28 (H) 6 - 20 MG/DL    Creatinine 1.49 (H) 0.55 - 1.02 MG/DL    BUN/Creatinine ratio 19 12 - 20      GFR est AA 43 (L) >60 ml/min/1.73m2    GFR est non-AA 36 (L) >60 ml/min/1.73m2    Calcium 9.1 8.5 - 10.1 MG/DL       Pre-medications  were administered as ordered and chemotherapy was initiated. Medications Administered     0.9% sodium chloride infusion     Admin Date  10/15/2020 Action  New Bag Dose  25 mL/hr Rate  25 mL/hr Route  IntraVENous Administered By  Hector Bautista, RN          0.9% sodium chloride injection 10 mL     Admin Date  10/15/2020 Action  Given Dose  10 mL Route  IntraVENous Administered By  Hector Bautista, RN          dexamethasone (DECADRON) 12 mg in 0.9% sodium chloride 50 mL, overfill volume 5 mL IVPB     Admin Date  10/15/2020 Action  Given Dose  12 mg Rate  232 mL/hr Route  IntraVENous Administered By  Hector Bautista, RN          diphenhydrAMINE (BENADRYL) injection 50 mg     Admin Date  10/15/2020 Action  Given Dose  50 mg Route  IntraVENous Administered By  Hector Bautista, RN          famotidine (PF) (PEPCID) 20 mg in 0.9% sodium chloride 10 mL injection     Admin Date  10/15/2020 Action  Given Dose  20 mg Route  IntraVENous Administered By  Hector Bautista, RN          heparin (porcine) pf 300-500 Units     Admin Date  10/15/2020 Action  Given Dose  500 Units Route  InterCATHeter Administered By  Hector Bautista, RN          PACLitaxeL (TAXOL) 197 mg in 0.9% sodium chloride 250 mL, overfill volume 25 mL chemo infusion     Admin Date  10/15/2020 Action  New Bag Dose  197 mg Rate  307.8 mL/hr Route  IntraVENous Administered By  Hector Bautista, NIKIA          palonosetron HCl (ALOXI) injection 0.25 mg     Admin Date  10/15/2020 Action  Given Dose  0.25 mg Route  IntraVENous Administered By  Hector Bautista, NIKIA          saline peripheral flush soln 10 mL     Admin Date  10/15/2020 Action  Given Dose  10 mL Route  InterCATHeter Administered By  Hector Bautista, RN                    1220 Pt tolerated treatment well.  Port maintained positive blood return throughout treatment. Flushed, heparinized and de-accessed per protocol. D/c home ambulatory in no distress.      Future Appointments   Date Time Provider Gabriel Phillip   10/22/2020  8:00 AM A1 [facility] [address] [place] [address]   10/28/2020 10:30 AM Jose Gottlieb MD [facility]   11/5/2020  8:00 AM A1 [facility] [address] [place] [address]   11/12/2020  8:00 AM A1 [facility] [place] [facility]   11/19/2020  8:00 AM A1 [facility]  W Yo Marin RN  October 15, 2020

## 2020-10-22 NOTE — PROGRESS NOTES
[address], [address] [place], [facility] 
P [phone]  F [phone] Office Visit Patient ID: 
Name: Swapnil Jade MRN: [id number] : 1960/60 y.o. Visit date: 10/22/2020 LEONID Johnson is a 62 y.o.  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 The patient's previous treatment include primary debulking and adjuvant Taxol/carbo with retreatment in  for recurrence. Subsequent lines of chemo as outlined below. She has most recently started single agent weekly Taxol on 18 following progression with chest wall involvement while on PARPi. Most recent EOD imaging in 2020 shows a variable response to her prior therapy though with mostly increased disease including liver, mediastium and abd with progressive right hydronephrosis. She is s/p ureteral stent placement.   
   
OncTx History: 
12: MARAH/BSO/Cytoreductive surgery on 12 noted carcinomatosis/omental caking. 10/2012-2013: 6 cycles Carbo/Taxol 2014-2014: 6 Cycles carbo/Taxol 3/2015-5/13/15: 3 cycles Avastin/Doxil until progression 2015-2015 Weekly Topotecan  
2015-3/2016: Weekly Topotecan 2017-2017Kelle Letters D1/D8 Q28 days for 6 cycles 2018: CT core needle bx chest wall mass: Metastatic high-grade serous carcinoma (see comment) Positive for malignancy. 2018-2018: Taxol weekly 2019: 10 fx 30cGy palliative RT to sternal lesion 2019 - 2020 [place]. Underwent right ureteral stent placement on 3/18/19. CT C/A/P 2020 with mixed response, but overall progression. Rising Ca-125 now 268. 
2020: CT CAP variable response to therapy with several lesions decreased in size.  However a majority of the lesions have increased in size consistent with progression of disease with involvement of the mediastinum, liver masses, and peritoneal carcinomatosis. Increase right-sided hydronephrosis. Yanni Pee 8/2020 - initiated Taxol weekly 10/2020 - RLE DVT 
 
 
CT Results (most recent): 
Results from [facility] Encounter encounter on 06/24/20 CT ABD PELV W CONT Narrative EXAM: CT CHEST W CONT, CT ABD PELV W CONT INDICATION: Ovarian cancer COMPARISON: 10/2/2019 CONTRAST: 100 mL of Isovue-370. TECHNIQUE:  
Following the uneventful intravenous administration of contrast, thin axial 
images were obtained through the chest, abdomen and pelvis. Coronal and sagittal 
reconstructions were generated. Oral contrast was administered. CT dose 
reduction was achieved through use of a standardized protocol tailored for this 
examination and automatic exposure control for dose modulation. FINDINGS:  
 
THYROID: No nodule. MEDIASTINUM: Left paratracheal adenopathy now measures 3.7 x 2.0 cm increased in 
size. Mass in AP window which extends superiorly into the left infraclavicular 
region and inferiorly into the left hilum is also increase in size. When 
measured in a similar fashion this measures 3.5 x 2.4 previously 3.0 x 2.9 cm. Right parasternal mass is decreased in size measuring 3.4 x 3.4 cm. Subcarinal 
adenopathy measures 2.8 x 2.3 cm increased in size. There is increased size of 
the nodular component adjacent to the xiphoid now measuring 2.6 x 3.3 cm. SWATI: Left hilar lymph node measures 3.2 x 2.4 cm previously 2.5 x 1.8 cm. THORACIC AORTA: No dissection or aneurysm. MAIN PULMONARY ARTERY: Normal in caliber. TRACHEA/BRONCHI: Patent. ESOPHAGUS: No wall thickening or dilatation. HEART: Normal in size. PLEURA: No effusion or pneumothorax. LUNGS: No nodule, mass, or airspace disease. LIVER: Multiple masses are noted and better visualized on current study 
secondary to the addition of intravenous contrast. Comparison lesions measure previously, there is a 3.5 x 2.8 cm mass in the right hepatic lobe series 3 
image 58 slightly increased in size. There is a 3.6 x 2.9 cm mass in the left 
hepatic lobe slightly decreased in size series 3 image 56. Left hepatic lobe 
mass series 3 image 45 measuring 4.0 x 2.8 cm slightly increased in size. There 
is extensive tumor infiltration in the liver hilum with narrowing of the main 
portal vein as well as the intrahepatic portal veins. The common hepatic artery 
is not well visualized and likely occluded with some collateral flow noted in 
the liver hilum. GALLBLADDER: Not visualized SPLEEN: Mass in the splenic hilum increased in size measuring 7.4 x 4.3 cm with 
invasion into the splenic hilum. PANCREAS: The pancreas is encased by tumor. ADRENALS: There is likely involvement of the left adrenal gland tumor. KIDNEYS: Right nephroureteral stent with mild to moderate right-sided 
hydronephrosis. STOMACH: Tumor encasing the gastric antrum. SMALL BOWEL: No dilatation or wall thickening. COLON: Tumor abutting the tumor encasing the rectosigmoid junction. Tumor 
abutting the transverse colon. APPENDIX: Unremarkable. PERITONEUM: Some free fluid noted in the pelvis. Increased size of mesenteric 
masses. Representative examples: 7.8 x 4.3 cm tumor anterior peritoneum series 3 
image 84. No pelvic tumor 7.5 x 4.8 cm series 3 image 100. RETROPERITONEUM:  
Right pelvic sidewall increased in size. Large right lateral lymph node 
measuring 3.1 x 2.0 cm not significantly changed. REPRODUCTIVE ORGANS: The visualized URINARY BLADDER: No mass or calculus. BONES: No destructive bone lesion. ADDITIONAL COMMENTS: N/A Impression IMPRESSION: 
1. There is some variable response to therapy with several lesions decreased in 
size. However a majority of the lesions have increased in size consistent with 
progression of disease with involvement of the mediastinum, liver masses, and 
peritoneal carcinomatosis. 2.  Increase right-sided hydronephrosis. [facility] CT 10/2019: 
FINDINGS: 
THYROID: Visualized gland is unremarkable. MEDIASTINUM: Upper left paratracheal node measures 2.2 x 3.4 cm (2, 8), 
previously 2.3 x 3.5 cm. Superior prevascular node measures 2.9 x 3.0 cm, 
previously 3.5 x 3.8 cm. Poorly delineated subcarinal node measures about 3.4 x 
4.8 cm, previously 3.6 x 4.8 cm. Precardiac nodes measure 1.9 x 2.5 cm and 1.7 x 
2.4 cm (2, 39), previously 2.1 x 2.7 cm and 2.0 x 2.0 cm. SWATI: No mass or lymphadenopathy. THORACIC AORTA: No dissection or aneurysm. MAIN PULMONARY ARTERY: Normal in caliber. TRACHEA/BRONCHI: Patent. ESOPHAGUS: No wall thickening or dilatation. HEART: Normal in size. PLEURA: No effusion or pneumothorax. LUNGS: No nodule, mass, or airspace disease. LIVER: Multiple hypodense lesions suspicious for metastases, measuring up to 2.5 
x 4.0 cm in segment 2 (2, 43) series, previously 2.8 x 3.7 cm, 3.2 x 4.6 cm in 
segment 3 (2, 55), previously 3.1 x 4.5 cm, and partially exophytic from the 
posterior inferior right lobe segment 6 measuring 2.5 x 3.5 cm, previously 2.8 x 
3.4 cm. GALLBLADDER: Unremarkable. SPLEEN: Central 1.9 x 3.1 cm hypodensity (2, 47), previously 1.9 x 2.3 cm. Otherwise unremarkable. PANCREAS: Inseparable from bulky central abdominal adenopathy with no identified 
ductal dilation. ADRENALS: Unremarkable. KIDNEYS: Right hydronephrosis has improved status post right nephroureteral 
stent placement. Kidneys otherwise appear unremarkable. STOMACH: Unremarkable. SMALL BOWEL: No dilatation or wall thickening. COLON: No dilation or wall thickening. APPENDIX: Unremarkable.  
PERITONEUM: Extensive peritoneal implants with representative largest lesions 
measuring 4.4 x 6.1 cm and the left adnexal region (2, 98), previously 4.3 x 5.7 
cm, 3.8 x 5.9 cm in the anterior midline (2, 81), previously 3.4 x 5.4 cm, and 
 2.8 x 4.7 cm in the right abdomen (2, 78), previously 3.2 x 4.7 cm. RETROPERITONEUM: Extensive rocio hepatis, retroperitoneal, and pelvic sidewall 
adenopathy. Conglomerate nkechi mass in the rocio hepatis region measures 5.7 x 
11.1 cm (2, 58) is, previously 6.3 x 12.2 cm. Aortocaval node measures 3.7 x 5.3 
cm (2, 77), previously 4.5 x 5.4 cm. Right pelvic sidewall node measures 2.9 x 
4.2 cm (2, 103), previously 3.4 x 4.2 cm. Left external iliac node measures 3.0 
x 4.8 cm (2, 101), previously 3.3 x 4.8 cm. REPRODUCTIVE ORGANS: Uterus and ovaries are surgically absent. URINARY BLADDER: No mass or calculus. Distal end of right nephroureteral stent 
positioned within bladder lumen. BONES: Degenerative spine change. No acute fracture or aggressive lesion. ADDITIONAL COMMENTS: Mass centered at the right sternocostal junction measures 5.3 x 5.4 cm (2, 21), previously 4.8 x 5.7 cm. Post surgical changes in the 
anterior abdominal wall. Left Port-A-Cath in place. Bilateral inguinal 
adenopathy measuring 2.1 x 3.2 cm on the right (2, 115), previously 2.3 x 3.7 cm 
and 2.7 x 2.8 cm on the left (2, 108), previously 2.9 x 3.1 cm. . 
  
IMPRESSION: Overall response to disease with either decrease in size or no 
significant change in extensive nkechi metastases, hepatic metastases, right 
sternal metastasis, and peritoneal carcinomatosis as above. No new metastasis or 
progressive metastasis identified. Status post right nephroureteral stent with 
interval resolution of right hydronephrosis. SUBJECTIVE: 
Presents to [facility]. Doing well today. Right leg improved. Denies pain. No CP/SOB. Continues to exercise. She denies any abd/chest/back pain currently. She ambulates at home with rollator. Goes up/down stairs slowly with assistance getting SOB. No weakness/light headedness. No falls. No N/V. Appetite stable. She drinks 4 bottles water/day with gatorade. +BM daily to every other day with miralax.  Previously seen by her urologist, plans for stent change next week. She remains mostly independent, OOB most of day. She reports completes chores and all self care and some light exercising. They are thinking of adding on to their home for a downstairs room. Two adult sons at home who cook for her. She is also fostering two boys 15 and 12. The older is moving out in October to reunite with his mother. She is planning to adopt the younger. Confirms if she passes, her son Satish will adopt him. She has discussed this with the adoption agency. She has chronic pedal/hand numbness/tingling due to taxol and DM. She wakes from sleep occasionally d/t this but resumes sleep easily. Follows with podiatrist. Denies any change in depression, continues medication. ROS 10 pt negative except as per above PMH: 
Past Medical History:  
Diagnosis Date  Adverse effect of anesthesia   
 sleep apnea cpap machine useage  Anemia  Arthritis DDD low back and knees  Asthma   
 uses albuterol prn only; none x 6 mos. Never hospitalized  BRCA negative 1/2013  Calculus of kidney  Chronic kidney disease   
 kidney stones  Chronic pain   
 knee pain bilat  CINV (chemotherapy-induced nausea and vomiting) 8/21/2014  Decreased hearing, right PATIENT STATES THIS IS DUE TO CHEMOTHERAPY  Diabetes ([address] Age 46 IDDM  Environmental allergies  Fibromyalgia  GERD (gastroesophageal reflux disease)   
 controlled with med  Hx of pulmonary embolus during pregnancy 1/18/2015  Hydronephrosis due to obstruction of ureter 3/18/2019  Hypertension  Ill-defined condition Sepsis 9-2015 -  SOURCE PORT  
 Ill-defined condition 2016  
 chemotherapy  Mass of chest wall 05/2018  Morbid obesity ([address]  Murmur, heart  Other and unspecified hyperlipidemia  Ovarian cancer ([address] 9/2012, 1/2014  
 serous, high grade, stage IIIc. s/p chemotx (has port)  Psychiatric disorder Depression/Anxiety  Rectal bleeding  Thrombo[address]ol[address] [address]  POST PARTUM; resolved- PELVIS  Thyroid disease HYPOTHYROID  Unspecified sleep apnea CPAP, Dr. Bryn Schilling PSH: 
Past Surgical History:  
Procedure Laterality Date  BREAST SURGERY PROCEDURE UNLISTED Lymph node in left breast 2017 Walter  
  X2  
 HX HYSTERECTOMY  2012 ROULA/BSO, tumor debulking, omentectomy for ovarian cancer  HX ORTHOPAEDIC Right   
 ankle-FX, R  
 HX ORTHOPAEDIC Right   
 FX R ARM  
 HX UROLOGICAL Right  STENT FOR KIDNEY STONES  
 HX VASCULAR ACCESS  2015  
 right chest- port placed  HX VASCULAR ACCESS Left 2017 TETE CATH  
 
SOC: 
Social History Socioeconomic History  Marital status:  Spouse name: Not on file  Number of children: Not on file  Years of education: Not on file  Highest education level: Not on file Occupational History  Not on file Social Needs  Financial resource strain: Not on file  Food insecurity Worry: Not on file Inability: Not on file  Transportation needs Medical: Not on file Non-medical: Not on file Tobacco Use  Smoking status: Never Smoker  Smokeless tobacco: Never Used Substance and Sexual Activity  Alcohol use: Yes Comment: 1 drink per month  Drug use: No  
 Sexual activity: Yes Lifestyle  Physical activity Days per week: Not on file Minutes per session: Not on file  Stress: Not on file Relationships  Social connections Talks on phone: Not on file Gets together: Not on file Attends Holiness service: Not on file Active member of club or organization: Not on file Attends meetings of clubs or organizations: Not on file Relationship status: Not on file  Intimate partner violence Fear of current or ex partner: Not on file Emotionally abused: Not on file Physically abused: Not on file Forced sexual activity: Not on file Other Topics Concern  Not on file Social History Narrative Lives in [place] alone.  passed away in 5/16 of a heart attack. Has 2 sons. Disability. 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 Enjoys swimming and going to the gym. Family History Family History Problem Relation Age of Onset  Hypertension Mother [facility] Arthritis-osteo Mother  Hypertension Father  Arthritis-osteo Father  Cancer Father PROSTATE  COPD Father  Hypertension Brother  Elevated Lipids Brother  Arthritis-osteo Brother  Hypertension Brother  Elevated Lipids Brother  Obesity Brother  Cancer Brother PROSTATE  Anesth Problems Son DELAYED AWAKENING  
 Diabetes Maternal Grandmother  Anesth Problems Paternal Grandmother PATIENT STATES GRANDMOTHER'S HEART STOPPED DURING SURGERY Medications: (reviewed) Current Outpatient Medications on File Prior to Encounter Medication Sig Dispense Refill  omega-3 acid ethyl esters (LOVAZA) 1 gram capsule TAKE 1 CAPSULE BY MOUTH TWICE A  Cap 2  
 traMADoL (ULTRAM) 50 mg tablet Take 50 mg by mouth every six (6) hours as needed for Pain.  apixaban (Eliquis) 5 mg tablet Take 5 mg by mouth two (2) times a day.  rosuvastatin (CRESTOR) 10 mg tablet Take 10 mg by mouth nightly.  doxazosin (CARDURA) 1 mg tablet Take  by mouth daily.  amoxicillin-clavulanate (AUGMENTIN) 875-125 mg per tablet  doxazosin (CARDURA) 1 mg tablet Take 1 mg by mouth daily.  SULFAMETHOXAZOLE-TRIMETHOPRIM PO Take  by mouth two (2) times a day. 7 days  dexAMETHasone (DECADRON) 4 mg tablet Take 2 tablets with breakfast the day before chemo and for 2 days after chemo 36 Tab 2  
 lidocaine-prilocaine (EMLA) topical cream Apply small amount over port area and cover with band aid one hour before chemo 30 g 3  
  ondansetron (ZOFRAN ODT) 4 mg disintegrating tablet Take 1 Tab by mouth every eight (8) hours as needed for Nausea. Indications: nausea and vomiting caused by cancer drugs 30 Tab 2  predniSONE (DELTASONE) 50 mg tablet Take 1 tablet 13 hours prior to scan, take 1 tablet 7 hours prior to scan and take 1 tablet one hour prior to scan 3 Tab 0  Ze[place]ula 100 mg cap Take 100 mg by mouth daily. 60 Cap 4  
 fluticasone propionate (FLONASE) 50 mcg/actuation nasal spray 2 SPRAYS IN BOTH NOSTRILS DAILY 1 Bottle 0  
 cycloSPORINE (RESTASIS) 0.05 % dpet  albuterol (PROVENTIL VENTOLIN) 2.5 mg /3 mL (0.083 %) nebu Take 2.5 mg by inhalation.  dextroamphetamine-amphetamine (ADDERALL) 20 mg tablet TAKE 1 TABLET BY MOUTH EVERY DAY  0  
 rosuvastatin (CRESTOR) 10 mg tablet TAKE 1 TABLET BY MOUTH EVERY DAY  0  
 telmisartan (MICARDIS) 20 mg tablet TAKE 1 TABLET BY MOUTH EVERY DAY 90 Tab 3  
 atorvastatin (LIPITOR) 20 mg tablet  BD ULTRA-FINE SHORT PEN NEEDLE 31 gauge x 5/16\" ndle USE TO INJECT INSULIN 5 TIMES DAILY 200 Pen Needle 11  
 insulin aspart U-100 (NOVOLOG FLEXPEN U-100 INSULIN) 100 unit/mL (3 mL) inpn INJECT 20 UNITS BEFORE EACH MEAL + 4 UNITS FOR EVERY 50 MG/DL ABOVE 150 MG/DL.  UNITS PER DAY 90 Adjustable Dose Pre-filled Pen Syringe 3  
 LEVEMIR FLEXTOUCH U-100 INSULN 100 unit/mL (3 mL) inpn INJECT 30 UNITS IN AM AND 50 UNITS AT NIGHT. INCREASE AS DIRECTED TO  UNITS/DAY. 90 Adjustable Dose Pre-filled Pen Syringe 3  DEXILANT 60 mg CpDB capsule (delayed release) TAKE 1 CAPSULE BY MOUTH EVERY DAY 90 Cap 3  
 LINZESS 145 mcg cap capsule TAKE 1 CAPSULE BY MOUTH EVERY DAYY 30 Cap 0  
 levothyroxine (SYNTHROID) 150 mcg tablet TAKE 1 TABLET BY MOUTH EVERY DAY BEFORE BREAKFAST 90 Tab 0  
 EPINEPHrine (EPIPEN) 0.3 mg/0.3 mL injection INJECT 0.3 ML BY INTRAMUSCULAR ROUTE ONCE AS NEEDED FOR UP TO 1 DOSE.  1  
 glucose blood VI test strips (TRUE METRIX GLUCOSE TEST STRIP) strip by Does Not Apply route See Admin Instructions. 30 Strip 5  
 lancets misc by Does Not Apply route daily. True Metrix lancets- test blood sugar once per day 30 Each 5  Blood-Glucose Meter monitoring kit Check up to tid, as directed 1 Kit 0  
 azelastine (ASTELIN) 137 mcg (0.1 %) nasal spray 1 [place] by Both Nostrils route daily as needed. Use in each nostril as directed  insulin detemir U-100 (LEVEMIR) 100 unit/mL injection 20 Units by SubCUTAneous route daily (with lunch).  montelukast (SINGULAIR) 10 mg tablet Take 10 mg by mouth nightly.  ascorbic acid, vitamin C, (VITAMIN C) 1,000 mg tablet Take 1,000 mg by mouth daily.  albuterol (PROVENTIL VENTOLIN) 2.5 mg /3 mL (0.083 %) nebulizer solution 2.5 mg by Nebulization route every four (4) hours as needed for Wheezing.  Liraglutide (VICTOZA) 0.6 mg/0.1 mL (18 mg/3 mL) pnij 1.8 mg by SubCUTAneous route daily (with lunch). 3 Pen 3  
 SYMBICORT 160-4.5 mcg/actuation HFAA Take 2 Puffs by inhalation two (2) times daily as needed. 2 Inhaler 3  clonazePAM (KLONOPIN) 0.5 mg tablet Take 0.5 mg by mouth nightly as needed.  sertraline (ZOLOFT) 50 mg tablet TAKE 1 TABLET BY MOUTH as needed  1  cholecalciferol, VITAMIN D3, (VITAMIN D3) 5,000 unit tab tablet Take 5,000 Units by mouth daily.  furosemide (LASIX) 40 mg tablet Take 40 mg by mouth every other day. 3 No current facility-administered medications on file prior to encounter. Allergies: (reviewed) Allergies Allergen Reactions  Carboplatin Other (comments) Patient developed shortness of breath, felt faint, coughing, skin flushed and \"itchy\". This occurred on the patients 8th treatment.  Iodinated Contrast Media Rash 13 hr pre-medication prior to IV contrast.  
Patient has done well with 1 hr pre-medication of (Solu-Medrol) 40mg &  (Benadryl) 50mg.  Lipitor [Atorvastatin] Myalgia  Shellfish Containing Products Hives  Tape [Adhesive] Itching and Other (comments) \"op site-- clear,thin tape\"--caused blister  Tomato Hives  Iodine And Iodide Containing Products Rash  Olmesartan Other (comments)  Losartan Other (comments)  
  headaches  Percocet [Oxycodone-Acetaminophen] Other (comments) Fever; however, current home med OBJECTIVE Physical Exam 
Visit Vitals /72 Pulse 72 Temp 97.4 °F (36.3 °C) Resp 18 Ht 5' 8\" (1.727 m) Wt 251 lb 5.2 oz (114 kg) LMP 09/07/2011 BMI 38.21 kg/m² GENERAL BRIAN: Conversant, pleasant, alert, oriented. NAD HEENT: Oropharynx clear. Sclera anicteric. No JVD. No adenopathy RESPIRATORY: Lung CTA, No rales/rhonchi. Adequate respiratory effort. CARDIOVASC: Reg rate/rhythm, stable murmur low grade. No visible chest lesion. GASTROINT: soft, non-tender, without masses or organomegaly, no fluid wave. Habitus limits exam.  
EXTREMITIES: bilat 1+ edema/girth nonpitting. nontender ALCON SURVEY: Negative NEURO: Grossly intact, no acute deficit. Oriented. Not depressed. Not agitated. Wt Readings from Last 3 Encounters:  
10/22/20 251 lb 5.2 oz (114 kg) 10/15/20 251 lb 6.4 oz (114 kg) 10/08/20 247 lb (112 kg) DATE REVIEW as available: 
Lab Results Component Value Date/Time WBC 4.2 10/22/2020 08:11 AM  
 Hemoglobin (POC) 10.5 (L) 03/20/2017 12:15 PM  
 HGB 6.6 (L) 10/22/2020 08:11 AM  
 Hematocrit (POC) 31 (L) 03/20/2017 12:15 PM  
 HCT 22.0 (L) 10/22/2020 08:11 AM  
 PLATELET 626 27/58/5435 08:11 AM  
 MCV 95.7 10/22/2020 08:11 AM  
 
Lab Results Component Value Date/Time  
 ABS. NEUTROPHILS 2.9 10/22/2020 08:11 AM  
 
Lab Results Component Value Date/Time  Sodium 141 10/22/2020 08:11 AM  
 Potassium 4.2 10/22/2020 08:11 AM  
 Chloride 110 (H) 10/22/2020 08:11 AM  
 CO2 25 10/22/2020 08:11 AM  
 Anion gap 6 10/22/2020 08:11 AM  
 Glucose 165 (H) 10/22/2020 08:11 AM  
 BUN 31 (H) 10/22/2020 08:11 AM  
 Creatinine 1.41 (H) 10/22/2020 08:11 AM  
 BUN/Creatinine ratio 22 (H) 10/22/2020 08:11 AM  
 GFR est AA 46 (L) 10/22/2020 08:11 AM  
 GFR est non-AA 38 (L) 10/22/2020 08:11 AM  
 Calcium 8.9 10/22/2020 08:11 AM  
 Bilirubin, total 0.7 10/08/2020 08:11 AM  
 Alk. phosphatase 89 10/08/2020 08:11 AM  
 Protein, total 7.8 10/08/2020 08:11 AM  
 Albumin 3.1 (L) 10/08/2020 08:11 AM  
 Globulin 4.7 (H) 10/08/2020 08:11 AM  
 A-G Ratio 0.7 (L) 10/08/2020 08:11 AM  
 ALT (SGPT) 20 10/08/2020 08:11 AM  
 
CA-125 Date Value Ref Range Status 10/08/2020 116 (H) 1.5 - 35.0 U/mL Final  
  Comment:  
  ** Note new reference range and method ** Results may vary depending on . Method used is STATS Group 
  
09/03/2020 187 (H) 1.5 - 35.0 U/mL Final  
  Comment:  
  ** Note new reference range and method ** Results may vary depending on . Method used is STATS Group 
  
06/11/2020 268 (H) 1.5 - 35.0 U/mL Final  
  Comment:  
  ** Note new reference range and method ** Results may vary depending on . Method used is STATS Group 
  
05/14/2020 233 (H) 1.5 - 35.0 U/mL Final  
  Comment:  
  ** Note new reference range and method ** Results may vary depending on . Method used is STATS Group 
  
03/19/2020 226 (H) 1.5 - 35.0 U/mL Final  
  Comment:  
  ** Note new reference range and method ** Results may vary depending on . Method used is STATS Group ECHO 1/2020 · Normal cavity size and systolic function (ejection fraction normal). Mild concentric hypertrophy. Estimated left ventricular ejection fraction is 55 - 60%. Visually measured ejection fraction. No regional wall motion abnormality noted. · Mildly dilated left atrium. · Mild aortic valve sclerosis. · Trace mitral valve regurgitation is present.  
 
 
IMPRESSION AND PLAN: 
 62 y.o. with diffusely metastatic ovarian cancer. Continues on palliative chemo, retrial taxol C4 Taxol today CKD stage 2-3 - stable. Continue home hydration. Right hydronephrosis - stent in place. Exchange Oct 
Liver mets -  asx Pain resolved - no longer requiring opioid therapy. Anemia - persistent d/t multiple causes, chemo hx. Transfuse today SOB/debility - hx Asthma, controlled on meds. Consider stimulants PRN. Consider home bed d/t stairs. Discussed concerns with her. Home PT for now. IDDM2/SHABANA/depression - controlled overall. Continue CPAP 
CIPN/diabetic polyneuropathy - not in pain, monitor. Discussed home safety. Wt loss, prot smiley malnutrition - supplements/alimentation as best as able. Psychosocial - she has some fear about the future. She is well supported, isaac remains very important. Monitor depressive sx mgmt. Discussed home life and plans for the future. Plans for adopting her foster child with support of her son Satish. Siva Patrick PA-C 
10/22/2020

## 2020-10-22 NOTE — PROGRESS NOTES
[facility] - Chemotherapy Progress Note 
 
0800 Pt admit to [facility] for Taxol ambulatory in stable condition but weak. Assessment completed. No new concerns voiced. Port accessed with positive blood return. Labs drawn and sent for processing. IV attached to NS at [facility]. Labs resulted with hgb of 6.6. Dax TENA at the bedside and decision made to given 1 unit of blood. Chemotherapy Flowsheet 10/22/2020 Cycle C3 D15 Date 10/22/2020 Drug / Regimen Taxol Dosage -  
Pre Hydration -  
Pre Meds given Notes given Patient Vitals for the past 12 hrs: 
 Temp Pulse Resp BP  
10/22/20 1600 97.4 °F (36.3 °C) 67 18 (!) 144/80  
10/22/20 1500 97.4 °F (36.3 °C) 75 18 131/74  
10/22/20 1430 97 °F (36.1 °C) 69 18 131/70  
10/22/20 1415 97.4 °F (36.3 °C) 72 18 122/72  
10/22/20 1354 97.5 °F (36.4 °C) 69 18 126/68  
10/22/20 0810 97.3 °F (36.3 °C) 82 18 (!) 142/79 Medications: 
Medications Administered 0.9% sodium chloride infusion Admin Date 
10/22/2020 Action New Bag Dose 25 mL/hr Rate 25 mL/hr Route IntraVENous Administered By Lele Bruce RN  
  
  
 0.9% sodium chloride injection 10 mL Admin Date 
10/22/2020 Action Given Dose 
10 mL Route IntraVENous Administered By Lele Bruce RN  
  
  
 dexamethasone (DECADRON) 12 mg in 0.9% sodium chloride 50 mL, overfill volume 5 mL IVPB Admin Date 
10/22/2020 Action Given Dose 
12 mg Rate 232 mL/hr Route IntraVENous Administered By Lele Bruce RN  
  
  
 diphenhydrAMINE (BENADRYL) injection 50 mg   
 Admin Date 
10/22/2020 Action Given Dose 50 mg Route IntraVENous Administered By Lele Bruce RN  
  
  
 famotidine (PF) (PEPCID) 20 mg in 0.9% sodium chloride 10 mL injection Admin Date 
10/22/2020 Action Given Dose 
20 mg Route IntraVENous Administered By Lele Bruce RN  
  
  
 heparin (porcine) pf 300-500 Units Admin Date 
10/22/2020 Action Given Dose 
500 Units Route InterCATHeter Administered By Blaise Mcknight RN  
  
  
 PACLitaxeL (TAXOL) 197 mg in 0.9% sodium chloride 250 mL, overfill volume 25 mL chemo infusion Admin Date 
10/22/2020 Action New Bag Dose 
197 mg Rate 
307.8 mL/hr Route IntraVENous Administered By Janine Vega RN  
  
  
 palonosetron HCl (ALOXI) injection 0.25 mg   
 Admin Date 
10/22/2020 Action Given Dose 0.25 mg Route IntraVENous Administered By Blaise Mcknight RN  
  
  
 saline peripheral flush soln 10 mL Admin Date 
10/22/2020 Action Given Dose 
10 mL Route InterCATHeter Administered By Blaise Mcknight RN  
  
  
  
 
 
1600 Pt tolerated treatment well. Port maintained positive blood return throughout treatment, flushed with positive blood return at conclusion. Pt refused to stay for 30 minutes infusion for observation. D/c home ambulatory in no distress. Pt aware of next appointment scheduled for 11/5/20 Recent Results (from the past 24 hour(s)) CBC WITH AUTOMATED DIFF Collection Time: 10/22/20  8:11 AM  
Result Value Ref Range WBC 4.2 3.6 - 11.0 K/uL  
 RBC 2.30 (L) 3.80 - 5.20 M/uL HGB 6.6 (L) 11.5 - 16.0 g/dL HCT 22.0 (L) 35.0 - 47.0 % MCV 95.7 80.0 - 99.0 FL  
 MCH 28.7 26.0 - 34.0 PG  
 MCHC 30.0 30.0 - 36.5 g/dL  
 RDW 18.4 (H) 11.5 - 14.5 % PLATELET 795 312 - 472 K/uL MPV 9.9 8.9 - 12.9 FL  
 NRBC 1.0 (H) 0  WBC ABSOLUTE NRBC 0.04 (H) 0.00 - 0.01 K/uL NEUTROPHILS 69 32 - 75 % LYMPHOCYTES 18 12 - 49 % MONOCYTES 9 5 - 13 % EOSINOPHILS 1 0 - 7 % BASOPHILS 1 0 - 1 % IMMATURE GRANULOCYTES 2 (H) 0.0 - 0.5 % ABS. NEUTROPHILS 2.9 1.8 - 8.0 K/UL  
 ABS. LYMPHOCYTES 0.8 0.8 - 3.5 K/UL  
 ABS. MONOCYTES 0.4 0.0 - 1.0 K/UL  
 ABS. EOSINOPHILS 0.0 0.0 - 0.4 K/UL  
 ABS. BASOPHILS 0.0 0.0 - 0.1 K/UL  
 ABS. IMM. GRANS. 0.1 (H) 0.00 - 0.04 K/UL  
 DF SMEAR SCANNED    
 RBC COMMENTS ANISOCYTOSIS 
2+ METABOLIC PANEL, BASIC  Collection Time: 10/22/20  8:11 AM  
 Result Value Ref Range Sodium 141 136 - 145 mmol/L Potassium 4.2 3.5 - 5.1 mmol/L Chloride 110 (H) 97 - 108 mmol/L  
 CO2 25 21 - 32 mmol/L Anion gap 6 5 - 15 mmol/L Glucose 165 (H) 65 - 100 mg/dL BUN 31 (H) 6 - 20 MG/DL Creatinine 1.41 (H) 0.55 - 1.02 MG/DL  
 BUN/Creatinine ratio 22 (H) 12 - 20 GFR est AA 46 (L) >60 ml/min/1.73m2 GFR est non-AA 38 (L) >60 ml/min/1.73m2 Calcium 8.9 8.5 - 10.1 MG/DL  
TYPE & SCREEN Collection Time: 10/22/20 10:29 AM  
Result Value Ref Range Crossmatch Expiration 10/25/2020 ABO/Rh(D) O POSITIVE Antibody screen NEG Unit number [id number] Blood component type RC LR Unit division 00 Status of unit ISSUED Crossmatch result Compatible RBC, ALLOCATE Collection Time: 10/22/20 11:15 AM  
Result Value Ref Range HISTORY CHECKED? Historical check performed

## 2020-10-28 NOTE — PROGRESS NOTES
27 [facility], [address] [place] 
P [phone]  F [phone] Office Visit Patient ID: 
Name: Swapnil Lebron. Formerly [facility] MRN: [id number] : 1960/60 y.o. Visit date: 10/28/2020 LEONID Johnson is a 62 y.o.  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 The patient's previous treatment procedures include primary Taxol/carbo with retreatment in  and Doxil/Avastin, topotecan, gemzar and now single agent weekly Taxol initiated 18 following progression with chest wall involvement. In 2018, patient did not follow up and self-discontinued her treatment regimen. She presented to the ER on 18 with SOB. Negative workup for PE or MI. Admitted to Hospitalist service from -18. Managed for acute asthma exacerbation, acute FELICIA, and hyperkalemia. The patient was initiated on Flaxton Nickel on 19. Underwent repeat CT C/A/P on 19 for purposes of following response to treatment. Patient was tolerating Magdaleno Nickel well for the first month, but then had a rise in her creatinine. However, she was also found to have hydronephrosis. Underwent right ureteral stent placement on 3/18/19. Restarted Zejula afterwards, although with continued rise in creatinine to >2. Held Flaxton Nickel again and restarted Zejula at 100mg/daily on 19. Presents today for follow-up and consideration of continuation of Magdaleno Nickel. Completed 2700 cGy of radiation to her chest wall mass on 19. Tolerating treatment well. Held for one week in 10/2019 for blood transfusion secondary to anemia. Normal iron and B12 labs. Her Ca-125 has bounced around, but has been steadily declining recently. CT C/A/P on 10/2/19 with improvement in her disease. CT C/A/P 2020 demonstrates progression. Rising Ca-125. On 2020 initiated on weekly paclitaxel. Presents today for hospital follow-up after recent admission last week to [facility] with right lower extremity DVT. She reports undergoing manometry testing for neuropathy of her right leg, and then she reports swelling in the entire leg. She went to the ER at that time and was diagnosed with a right LE DVT. She was initiated on Lovenox and transitioned to Eliquis. She is scheduled to start cycle 5 of treatment next week. She missed the last weekly dose of cycle 3. She has tolerated treatment well thus far, and denies complaints except for pain and swelling in her right leg. She denies pain in her chest wall or abdomen. She does have some constipation on the day of treatment, which is relieved with Miralax. Reports plan for ureteral stent exchange at the end of October. She has not required any antibiotics recently. Denies change in appetite or bowel habits. Denies vaginal bleeding, hematuria, hematochezia, constipation, diarrhea, nausea, vomiting, CP, SOB, fevers, or chills. 
   
OncTx History: 
9/21/12: MARAH/BSO/Cytoreductive surgery on 9/21/12 noted carcinomatosis/omental caking. 10/2012-2/2013: 6 cycles Carbo/Taxol 8/2014-11/2014: 6 Cycles carbo/Taxol 3/2015-5/13/15: 3 cycles Avastin/Doxil until progression 7/2015-8/2015 Weekly Topotecan  
12/2015-3/2016: Weekly Topotecan 2/2017-9/2017Chadwick Bride D1/D8 Q28 days for 6 cycles 4/2018: CT core needle bx chest wall mass: Metastatic high-grade serous carcinoma (see comment) General Categorization Positive for malignancy. 5/8/2018-Present: Taxol  X2,5,35; S/P  Cycle #4  8/14/2018. Patient discontinued treatment secondary to side effects and fatigue. 1/23/2019 - Initiated Johnathan Dennison. Held do to rising creatinine. However, she was also found to have hydronephrosis. Underwent right ureteral stent placement on 3/18/19. CT C/A/P 6/24/2020 with mixed response, but overall progression. Rising Ca-125 now 268. 8/6/2020: Initiated weekly paclitaxel 80mg/m2 days , 8, and 15 of 28-day cycle. Imaging Review:  
CT C/A/P 6/24/2020: 
FINDINGS:  
THYROID: No nodule. MEDIASTINUM: Left paratracheal adenopathy now measures 3.7 x 2.0 cm increased in 
size. Mass in AP window which extends superiorly into the left infraclavicular 
region and inferiorly into the left hilum is also increase in size. When 
measured in a similar fashion this measures 3.5 x 2.4 previously 3.0 x 2.9 cm. Right parasternal mass is decreased in size measuring 3.4 x 3.4 cm. Subcarinal 
adenopathy measures 2.8 x 2.3 cm increased in size. There is increased size of 
the nodular component adjacent to the xiphoid now measuring 2.6 x 3.3 cm. SWATI: Left hilar lymph node measures 3.2 x 2.4 cm previously 2.5 x 1.8 cm. THORACIC AORTA: No dissection or aneurysm. MAIN PULMONARY ARTERY: Normal in caliber. TRACHEA/BRONCHI: Patent. ESOPHAGUS: No wall thickening or dilatation. HEART: Normal in size. PLEURA: No effusion or pneumothorax. LUNGS: No nodule, mass, or airspace disease. LIVER: Multiple masses are noted and better visualized on current study 
secondary to the addition of intravenous contrast. Comparison lesions measure 
previously, there is a 3.5 x 2.8 cm mass in the right hepatic lobe series 3 
image 58 slightly increased in size. There is a 3.6 x 2.9 cm mass in the left 
hepatic lobe slightly decreased in size series 3 image 56. Left hepatic lobe 
mass series 3 image 45 measuring 4.0 x 2.8 cm slightly increased in size. There 
is extensive tumor infiltration in the liver hilum with narrowing of the main 
portal vein as well as the intrahepatic portal veins. The common hepatic artery 
is not well visualized and likely occluded with some collateral flow noted in 
the liver hilum. GALLBLADDER: Not visualized SPLEEN: Mass in the splenic hilum increased in size measuring 7.4 x 4.3 cm with 
invasion into the splenic hilum. PANCREAS: The pancreas is encased by tumor. ADRENALS: There is likely involvement of the left adrenal gland tumor. KIDNEYS: Right nephroureteral stent with mild to moderate right-sided 
hydronephrosis. STOMACH: Tumor encasing the gastric antrum. SMALL BOWEL: No dilatation or wall thickening. COLON: Tumor abutting the tumor encasing the rectosigmoid junction. Tumor 
abutting the transverse colon. APPENDIX: Unremarkable. PERITONEUM: Some free fluid noted in the pelvis. Increased size of mesenteric 
masses. Representative examples: 7.8 x 4.3 cm tumor anterior peritoneum series 3 
image 84. No pelvic tumor 7.5 x 4.8 cm series 3 image 100. RETROPERITONEUM:  
Right pelvic sidewall increased in size. Large right lateral lymph node 
measuring 3.1 x 2.0 cm not significantly changed. REPRODUCTIVE ORGANS: The visualized URINARY BLADDER: No mass or calculus. BONES: No destructive bone lesion. ADDITIONAL COMMENTS: N/A IMPRESSION IMPRESSION: 
1. There is some variable response to therapy with several lesions decreased in 
size. However a majority of the lesions have increased in size consistent with 
progression of disease with involvement of the mediastinum, liver masses, and 
peritoneal carcinomatosis. 2.  Increase right-sided hydronephrosis. Nyasia Long CT C/A/P 10/2/19:  
FINDINGS: 
THYROID: Visualized gland is unremarkable. MEDIASTINUM: Upper left paratracheal node measures 2.2 x 3.4 cm (2, 8), 
previously 2.3 x 3.5 cm. Superior prevascular node measures 2.9 x 3.0 cm, 
previously 3.5 x 3.8 cm. Poorly delineated subcarinal node measures about 3.4 x 
4.8 cm, previously 3.6 x 4.8 cm. Precardiac nodes measure 1.9 x 2.5 cm and 1.7 x 
2.4 cm (2, 39), previously 2.1 x 2.7 cm and 2.0 x 2.0 cm. SWATI: No mass or lymphadenopathy. THORACIC AORTA: No dissection or aneurysm. MAIN PULMONARY ARTERY: Normal in caliber. TRACHEA/BRONCHI: Patent. ESOPHAGUS: No wall thickening or dilatation. HEART: Normal in size. PLEURA: No effusion or pneumothorax. LUNGS: No nodule, mass, or airspace disease. LIVER: Multiple hypodense lesions suspicious for metastases, measuring up to 2.5 
x 4.0 cm in segment 2 (2, 43) series, previously 2.8 x 3.7 cm, 3.2 x 4.6 cm in 
segment 3 (2, 55), previously 3.1 x 4.5 cm, and partially exophytic from the 
posterior inferior right lobe segment 6 measuring 2.5 x 3.5 cm, previously 2.8 x 
3.4 cm. GALLBLADDER: Unremarkable. SPLEEN: Central 1.9 x 3.1 cm hypodensity (2, 47), previously 1.9 x 2.3 cm. Otherwise unremarkable. PANCREAS: Inseparable from bulky central abdominal adenopathy with no identified 
ductal dilation. ADRENALS: Unremarkable. KIDNEYS: Right hydronephrosis has improved status post right nephroureteral 
stent placement. Kidneys otherwise appear unremarkable. STOMACH: Unremarkable. SMALL BOWEL: No dilatation or wall thickening. COLON: No dilation or wall thickening. APPENDIX: Unremarkable. PERITONEUM: Extensive peritoneal implants with representative largest lesions 
measuring 4.4 x 6.1 cm and the left adnexal region (2, 98), previously 4.3 x 5.7 
cm, 3.8 x 5.9 cm in the anterior midline (2, 81), previously 3.4 x 5.4 cm, and 
2.8 x 4.7 cm in the right abdomen (2, 78), previously 3.2 x 4.7 cm. RETROPERITONEUM: Extensive rocio hepatis, retroperitoneal, and pelvic sidewall 
adenopathy. Conglomerate nkechi mass in the rocio hepatis region measures 5.7 x 
11.1 cm (2, 58) is, previously 6.3 x 12.2 cm. Aortocaval node measures 3.7 x 5.3 
cm (2, 77), previously 4.5 x 5.4 cm. Right pelvic sidewall node measures 2.9 x 
4.2 cm (2, 103), previously 3.4 x 4.2 cm. Left external iliac node measures 3.0 
x 4.8 cm (2, 101), previously 3.3 x 4.8 cm. REPRODUCTIVE ORGANS: Uterus and ovaries are surgically absent. URINARY BLADDER: No mass or calculus. Distal end of right nephroureteral stent 
positioned within bladder lumen. BONES: Degenerative spine change. No acute fracture or aggressive lesion. ADDITIONAL COMMENTS: Mass centered at the right sternocostal junction measures 5.3 x 5.4 cm (2, 21), previously 4.8 x 5.7 cm. Post surgical changes in the 
anterior abdominal wall. Left Port-A-Cath in place. Bilateral inguinal 
adenopathy measuring 2.1 x 3.2 cm on the right (2, 115), previously 2.3 x 3.7 cm 
and 2.7 x 2.8 cm on the left (2, 108), previously 2.9 x 3.1 cm. Haeseb Torres IMPRESSION IMPRESSION: Overall response to disease with either decrease in size or no 
significant change in extensive nkechi metastases, hepatic metastases, right 
sternal metastasis, and peritoneal carcinomatosis as above. No new metastasis or 
progressive metastasis identified. Status post right nephroureteral stent with 
interval resolution of right hydronephrosis. CT C/A/P 2/5/19: 
FINDINGS:  
THYROID: No nodule. MEDIASTINUM: Mediastinal lymphadenopathy is unchanged. A reference superior 
mediastinal lymph node is stable measuring 3.3 cm. SWATI: No mass or lymphadenopathy. THORACIC AORTA: No dissection or aneurysm. MAIN PULMONARY ARTERY: Normal in caliber. TRACHEA/BRONCHI: Patent. ESOPHAGUS: No wall thickening or dilatation. HEART: Normal in size. PLEURA: No effusion or pneumothorax. LUNGS: No nodule, mass, or airspace disease. LIVER: Evaluation of the liver is limited by lack of intravenous contrast. There 
is a vague 4.4 cm hypodense lesion in the left hepatic lobe previously measuring 3.0 cm. Lesion in the right hepatic lobe now measures 3.3 cm, previously 
measuring 2.7 cm. GALLBLADDER: Unremarkable. SPLEEN: Central soft tissue abnormality has increased in size, now measuring 4.9 
cm, previously measuring 3.1 cm. PANCREAS: Not well evaluated given the massive lymphadenopathy and lack of 
intravenous contrast. 
ADRENALS: Unremarkable. KIDNEYS: There is no moderate right hydronephrosis. STOMACH: Unremarkable. SMALL BOWEL: No dilatation or wall thickening. COLON: No dilatation or wall thickening. APPENDIX: Not visualized. PERITONEUM: Bulky gastrohepatic, periceliac, portacaval, and mesenteric 
lymphadenopathy has increased. There is secondary invasion of the liver. Maximum 
dimension in the upper abdomen is currently 10.4 cm, previously 9.3 cm. Elemental soft tissue nodules have increased in size, with a reference 7.0 cm 
nodule in the left abdomen previously measuring 5.5 cm. Soft tissue nodule in 
the deep pelvis between the bladder and colon has increased in size as well. RETROPERITONEUM: No lymphadenopathy or aortic aneurysm. REPRODUCTIVE ORGANS: The uterus and ovaries are surgically absent. URINARY BLADDER: No mass or calculus. BONES: Soft tissue mass involving the right sternum has not changed. Degenerative changes are seen in the thoracic and lumbar spine. ADDITIONAL COMMENTS: N/A IMPRESSION IMPRESSION: 
Stable mediastinal lymphadenopathy. Progressive abdominal lymphadenopathy as 
described above. Liver lesions have increased in size compared to the prior 
exam. Stable right sternal mass. Moderate right hydroureteronephrosis is now 
noted which appears to be related to the lymphadenopathy. ROS Constitutional: no weight loss, fever, night sweats Respiratory: no cough, shortness of breath, or wheezing Cardiovascular: no chest pain or dyspnea on exertion. Reports chest wall mass smaller, no associated pain. Heme: No abnormal bleeding Gastrointestinal: no abdominal pain, change in bowel habits, or black or bloody stools Genito-Urinary: no dysuria, trouble voiding, or hematuria Musculoskeletal: + swelling in ankle - bilateral, mostly only at end of day Neurological: mild neuropathy Derm: pigmentation/induration medial left leg. Prior injury from fall. Psych: positive for depression - controlled PMH: 
Past Medical History:  
Diagnosis Date  Adverse effect of anesthesia   
 sleep apnea cpap machine useage  Anemia  Arthritis DDD low back and knees  Asthma uses albuterol prn only; none x 6 mos. Never hospitalized  BRCA negative 2013  Calculus of kidney  Chronic kidney disease   
 kidney stones  Chronic pain   
 knee pain bilat  CINV (chemotherapy-induced nausea and vomiting) 2014  Decreased hearing, right PATIENT STATES THIS IS DUE TO CHEMOTHERAPY  Diabetes ([address] Age 46 IDDM  Environmental allergies  Fibromyalgia  GERD (gastroesophageal reflux disease)   
 controlled with med  Hx of pulmonary embolus during pregnancy 2015  Hydronephrosis due to obstruction of ureter 3/18/2019  Hypertension  Ill-defined condition Sepsis  -  SOURCE PORT  
 Ill-defined condition 2016  
 chemotherapy  Mass of chest wall 2018  Morbid obesity ([address]  Murmur, heart  Other and unspecified hyperlipidemia  Ovarian cancer ([address] 2012, 2014  
 serous, high grade, stage IIIc. s/p chemotx (has port)  Psychiatric disorder Depression/Anxiety  Rectal bleeding  Thromboembolus ([address]  POST PARTUM; resolved- PELVIS  Thyroid disease HYPOTHYROID  Unspecified sleep apnea CPAP, Dr. Kalpesh Mo PSH: 
Past Surgical History:  
Procedure Laterality Date  BREAST SURGERY PROCEDURE UNLISTED Lymph node in left breast 2017 [place]  
  X2  
 HX HYSTERECTOMY  2012 ROULA/BSO, tumor debulking, omentectomy for ovarian cancer  HX ORTHOPAEDIC Right   
 ankle-FX, R  
 HX ORTHOPAEDIC Right   
 FX R ARM  
 HX UROLOGICAL Right  STENT FOR KIDNEY STONES  
 HX VASCULAR ACCESS  2015  
 right chest- port placed  HX VASCULAR ACCESS Left 2017 [facility] CATH  
 
SOC: 
Social History Socioeconomic History  Marital status:  Spouse name: Not on file  Number of children: Not on file  Years of education: Not on file  Highest education level: Not on file Occupational History  Not on file Social Needs  Financial resource strain: Not on file  Food insecurity Worry: Not on file Inability: Not on file  Transportation needs Medical: Not on file Non-medical: Not on file Tobacco Use  Smoking status: Never Smoker  Smokeless tobacco: Never Used Substance and Sexual Activity  Alcohol use: Yes Comment: 1 drink per month  Drug use: No  
 Sexual activity: Yes Lifestyle  Physical activity Days per week: Not on file Minutes per session: Not on file  Stress: Not on file Relationships  Social connections Talks on phone: Not on file Gets together: Not on file Attends Druze service: Not on file Active member of club or organization: Not on file Attends meetings of clubs or organizations: Not on file Relationship status: Not on file  Intimate partner violence Fear of current or ex partner: Not on file Emotionally abused: Not on file Physically abused: Not on file Forced sexual activity: Not on file Other Topics Concern  Not on file Social History Narrative Lives in [facility] alone.  passed away in 5/16 of a heart attack. Has 2 sons. Disability. 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 Enjoys swimming and going to the gym. Family History Family History Problem Relation Age of Onset  Hypertension Mother Duffy Arthritis-osteo Mother  Hypertension Father  Arthritis-osteo Father  Cancer Father PROSTATE  COPD Father  Hypertension Brother  Elevated Lipids Brother  Arthritis-osteo Brother  Hypertension Brother  Elevated Lipids Brother  Obesity Brother  Cancer Brother PROSTATE  Anesth Problems Son DELAYED AWAKENING  
 Diabetes Maternal Grandmother  Anesth Problems Paternal Grandmother PATIENT STATES GRANDMOTHER'S HEART STOPPED DURING SURGERY Medications: (reviewed) Current Outpatient Medications on File Prior to Visit Medication Sig Dispense Refill  omega-3 acid ethyl esters (LOVAZA) 1 gram capsule TAKE 1 CAPSULE BY MOUTH TWICE A  Cap 2  
 traMADoL (ULTRAM) 50 mg tablet Take 50 mg by mouth every six (6) hours as needed for Pain.  apixaban (Eliquis) 5 mg tablet Take 5 mg by mouth two (2) times a day.  rosuvastatin (CRESTOR) 10 mg tablet Take 10 mg by mouth nightly.  doxazosin (CARDURA) 1 mg tablet Take  by mouth daily.  doxazosin (CARDURA) 1 mg tablet Take 1 mg by mouth daily.  SULFAMETHOXAZOLE-TRIMETHOPRIM PO Take  by mouth two (2) times a day. 7 days  dexAMETHasone (DECADRON) 4 mg tablet Take 2 tablets with breakfast the day before chemo and for 2 days after chemo 36 Tab 2  
 ondansetron (ZOFRAN ODT) 4 mg disintegrating tablet Take 1 Tab by mouth every eight (8) hours as needed for Nausea. Indications: nausea and vomiting caused by cancer drugs 30 Tab 2  predniSONE (DELTASONE) 50 mg tablet Take 1 tablet 13 hours prior to scan, take 1 tablet 7 hours prior to scan and take 1 tablet one hour prior to scan 3 Tab 0  Zejula 100 mg cap Take 100 mg by mouth daily. 60 Cap 4  
 fluticasone propionate (FLONASE) 50 mcg/actuation nasal spray 2 SPRAYS IN BOTH NOSTRILS DAILY 1 Bottle 0  
 cycloSPORINE (RESTASIS) 0.05 % dpet  albuterol (PROVENTIL VENTOLIN) 2.5 mg /3 mL (0.083 %) nebu Take 2.5 mg by inhalation.  dextroamphetamine-amphetamine (ADDERALL) 20 mg tablet TAKE 1 TABLET BY MOUTH EVERY DAY  0  
 rosuvastatin (CRESTOR) 10 mg tablet TAKE 1 TABLET BY MOUTH EVERY DAY  0  
 atorvastatin (LIPITOR) 20 mg tablet  BD ULTRA-FINE SHORT PEN NEEDLE 31 gauge x 5/16\" ndle USE TO INJECT INSULIN 5 TIMES DAILY 200 Pen Needle 11  
 insulin aspart U-100 (NOVOLOG FLEXPEN U-100 INSULIN) 100 unit/mL (3 mL) inpn INJECT 20 UNITS BEFORE EACH MEAL + 4 UNITS FOR EVERY 50 MG/DL ABOVE 150 MG/DL.  UNITS PER DAY 90 Adjustable Dose Pre-filled Pen Syringe 3  
 LEVEMIR FLEXTOUCH U-100 INSULN 100 unit/mL (3 mL) inpn INJECT 30 UNITS IN AM AND 50 UNITS AT NIGHT. INCREASE AS DIRECTED TO  UNITS/DAY. 90 Adjustable Dose Pre-filled Pen Syringe 3  DEXILANT 60 mg CpDB capsule (delayed release) TAKE 1 CAPSULE BY MOUTH EVERY DAY 90 Cap 3  
 LINZESS 145 mcg cap capsule TAKE 1 CAPSULE BY MOUTH EVERY DAYY 30 Cap 0  
 levothyroxine (SYNTHROID) 150 mcg tablet TAKE 1 TABLET BY MOUTH EVERY DAY BEFORE BREAKFAST 90 Tab 0  
 EPINEPHrine (EPIPEN) 0.3 mg/0.3 mL injection INJECT 0.3 ML BY INTRAMUSCULAR ROUTE ONCE AS NEEDED FOR UP TO 1 DOSE.  1  
 glucose blood VI test strips (TRUE METRIX GLUCOSE TEST STRIP) strip by Does Not Apply route See Admin Instructions. 30 Strip 5  
 lancets misc by Does Not Apply route daily. True Metrix lancets- test blood sugar once per day 30 Each 5  Blood-Glucose Meter monitoring kit Check up to tid, as directed 1 Kit 0  
 azelastine (ASTELIN) 137 mcg (0.1 %) nasal spray 1 Dime Box by Both Nostrils route daily as needed. Use in each nostril as directed  insulin detemir U-100 (LEVEMIR) 100 unit/mL injection 20 Units by SubCUTAneous route daily (with lunch).  montelukast (SINGULAIR) 10 mg tablet Take 10 mg by mouth nightly.  ascorbic acid, vitamin C, (VITAMIN C) 1,000 mg tablet Take 1,000 mg by mouth daily.  albuterol (PROVENTIL VENTOLIN) 2.5 mg /3 mL (0.083 %) nebulizer solution 2.5 mg by Nebulization route every four (4) hours as needed for Wheezing.  Liraglutide (VICTOZA) 0.6 mg/0.1 mL (18 mg/3 mL) pnij 1.8 mg by SubCUTAneous route daily (with lunch). 3 Pen 3  
 SYMBICORT 160-4.5 mcg/actuation HFAA Take 2 Puffs by inhalation two (2) times daily as needed. 2 Inhaler 3  clonazePAM (KLONOPIN) 0.5 mg tablet Take 0.5 mg by mouth nightly as needed.     
 sertraline (ZOLOFT) 50 mg tablet TAKE 1 TABLET BY MOUTH as needed  1  
  cholecalciferol, VITAMIN D3, (VITAMIN D3) 5,000 unit tab tablet Take 5,000 Units by mouth daily.  furosemide (LASIX) 40 mg tablet Take 40 mg by mouth every other day. 3  
 lidocaine-prilocaine (EMLA) topical cream Apply small amount over port area and cover with band aid one hour before chemo 30 g 3  
 telmisartan (MICARDIS) 20 mg tablet TAKE 1 TABLET BY MOUTH EVERY DAY 90 Tab 3 No current facility-administered medications on file prior to visit. Allergies: (reviewed) Allergies Allergen Reactions  Carboplatin Other (comments) Patient developed shortness of breath, felt faint, coughing, skin flushed and \"itchy\". This occurred on the patients 8th treatment.  Iodinated Contrast Media Rash 13 hr pre-medication prior to IV contrast.  
Patient has done well with 1 hr pre-medication of (Solu-Medrol) 40mg &  (Benadryl) 50mg.  Lipitor [Atorvastatin] Myalgia  Shellfish Containing Products Hives  Tape [Adhesive] Itching and Other (comments) \"op site-- clear,thin tape\"--caused blister  Tomato Hives  Iodine And Iodide Containing Products Rash  Olmesartan Other (comments)  Losartan Other (comments)  
  headaches  Percocet [Oxycodone-Acetaminophen] Other (comments) Fever; however, current home med OBJECTIVE Physical Exam 
Visit Vitals /83 (BP 1 Location: Left arm, BP Patient Position: Sitting) Pulse 84 Ht 5' 7.99\" (1.727 m) Wt 258 lb 6.4 oz (117.2 kg) LMP 09/07/2011 BMI 39.30 kg/m² Physical Exam: 
General: Alert and oriented. No acute distress. Well-nourished HEENT: No thyroid enlargment. Neck supple without restrictions. Sclera normal. Normal occular motion. Moist mucous membranes. Lymphatics: No evidence of axillary, cervical, or subclavicular adenopathy. Respiratory: clear to auscultation and percussion to the bases. No CVAT.  Chest wall mass is still palpable along the superior sternum at the level of the superior aspect of the breasts, but improved. Normal overlying skin Cardiovascular: regular rate and rhythm. No murmurs, rubs, or gallops. Gastrointestinal: soft, non-tender, non-distended, no masses or organomegaly. Well-healed incision. Musculoskeletal: normal gait. No joint tenderness, deformity or swelling. No muscular tenderness. Extremities: extremities normal, atraumatic, mild 1+ edema bilaterally. Pelvic: deferred today Neuro: Grossly intact. Normal gait and movement. No acute deficit Skin: No evidence of rashes or skin changes. DATE REVIEW as available: 
Lab Results Component Value Date/Time WBC 4.2 10/22/2020 08:11 AM  
 Hemoglobin (POC) 10.5 (L) 03/20/2017 12:15 PM  
 HGB 6.6 (L) 10/22/2020 08:11 AM  
 Hematocrit (POC) 31 (L) 03/20/2017 12:15 PM  
 HCT 22.0 (L) 10/22/2020 08:11 AM  
 PLATELET 571 42/52/9109 08:11 AM  
 MCV 95.7 10/22/2020 08:11 AM  
 
Lab Results Component Value Date/Time  
 ABS. NEUTROPHILS 2.9 10/22/2020 08:11 AM  
 
Lab Results Component Value Date/Time Sodium 141 10/22/2020 08:11 AM  
 Potassium 4.2 10/22/2020 08:11 AM  
 Chloride 110 (H) 10/22/2020 08:11 AM  
 CO2 25 10/22/2020 08:11 AM  
 Anion gap 6 10/22/2020 08:11 AM  
 Glucose 165 (H) 10/22/2020 08:11 AM  
 BUN 31 (H) 10/22/2020 08:11 AM  
 Creatinine 1.41 (H) 10/22/2020 08:11 AM  
 BUN/Creatinine ratio 22 (H) 10/22/2020 08:11 AM  
 GFR est AA 46 (L) 10/22/2020 08:11 AM  
 GFR est non-AA 38 (L) 10/22/2020 08:11 AM  
 Calcium 8.9 10/22/2020 08:11 AM  
 Bilirubin, total 0.7 10/08/2020 08:11 AM  
 Alk. phosphatase 89 10/08/2020 08:11 AM  
 Protein, total 7.8 10/08/2020 08:11 AM  
 Albumin 3.1 (L) 10/08/2020 08:11 AM  
 Globulin 4.7 (H) 10/08/2020 08:11 AM  
 A-G Ratio 0.7 (L) 10/08/2020 08:11 AM  
 ALT (SGPT) 20 10/08/2020 08:11 AM  
 
Ca-125: 
6/11/2020: 268 
9/3/2020: 187 ECHO 7/17/18 Left ventricle: Systolic function was normal. Ejection fraction was estimated in the range of 55 % to 60 %. There were no regional wall motion 
abnormalities. Wall thickness was mildly increased. Left atrium: The atrium was mildly dilated. Mitral valve: There was mild regurgitation. Aortic valve: Leaflets exhibited sclerosis without stenosis. Not well visualized. IMPRESSION AND PLAN: 
Ms. Elma Holcomb is a 62 y.o. female with recurrent, metastatic ovarian cancer. Heavily pre-treated. Lost to follow-up since 9/2018, at which time patient self-discontinued weekly paclitaxel. Initiated Luetta Justin on 1/23/19. Held after 1 month secondary to rising creatinine. Found to have right hydronephrosis. Right ureteral stent placed 3/18/19 with normalization of creatinine. Completed 2700 cGy to the sternun 7/11/19. Restarted Zejula at 100mg/daily on 4/23/19. Now with rising Ca-125 and progression on CT 6/24/2020. Initiated on weekly Taxol 80mg/m2 days 1, 8, and 15 in a 28-day cycle on 8/6/2020. Problem List Items Addressed This Visit Endocrine Ovarian cancer ([address] - Primary Type 2 diabetes mellitus with nephropathy ([address] Immune Lymphadenopathy, mediastinal  
  
 Respiratory SHABANA on CPAP Other Carcinomatosis ([address] Morbid obesity ([address] On antineoplastic chemotherapy Reviewed patient's course to date. Presents today for consideration of cycle 5 weekly paclitaxel. Recent RLE DVT. Remains on Eliquis. Patient to remain on Eliquis indefinitely given her active ongoing treatment of her cancer. Tolerated cycle 1-5 well. Her Ca-125 is responding to treatment nicely. Will follow-up pre-chemo labs. Will plan for repeat imaging after cycle 6 given she her Ca-125 is responding. Would plan to treat until progression, or may consider maintenance PARPi after 6 cycles with either olaparib or rucaparib. Urology is managing her stent and recurrent UTIs. Has not required antibiotics for some time. Plan for stent exchange on 10/30/2020 per patient. The patient has underlying diabetes and neuropathy. She has follow-up next week regarding numbness in the bottoms of her feet. I expressed my concerns that we will need to watch this closely while on weekly Taxol, as this may certainly worsen her diabetic neuropathy. All questions and concerns were addressed with the patient and she is comfortable with the plan.  
 
Fabian Faith MD

## 2020-10-30 NOTE — TELEPHONE ENCOUNTER
Pt had renal right stent replaced on 10/30/20. States she saw her PCP 10/29/20 as right foot was swelling and hurting, doppler repeated and shows a blood clot, she was put on Eliquis on 9/27/20 while in hospital for blood clot in same area. PCP recommended she call Dr. Toña Taylor with report.

## 2020-11-05 NOTE — PROGRESS NOTES
[facility] Chemo Progress Note Date: 2020 Name: Emma Briceno MRN: [id number] : 1960 
 
0800 Ms. You Briceno Arrived to [facility] for  d1 C4 Paclitaxel ambulatory in stable condition. Assessment was completed, no acute issues at this time, no new complaints voiced. Port accessed with positive blood return. Labs drawn and sent for processing. I spoke yo MURRAY about HGB twice, patient will be called by the Oncology office to discuss further plans of treatment. Chemotherapy Flowsheet 2020 Cycle C4 D1 Date 2020 Drug / Regimen Taxol Dosage -  
Pre Hydration -  
Pre Meds given Notes given Ms. Moore's vitals were reviewed. Patient Vitals for the past 12 hrs: 
 Temp Pulse Resp BP  
20 1340  68  (!) 146/80  
20 0801 97.3 °F (36.3 °C) 86 18 (!) 156/83 Lab results were obtained and reviewed. Recent Results (from the past 12 hour(s)) CBC WITH AUTOMATED DIFF Collection Time: 20  8:07 AM  
Result Value Ref Range WBC 5.0 3.6 - 11.0 K/uL  
 RBC 2.40 (L) 3.80 - 5.20 M/uL HGB 6.8 (L) 11.5 - 16.0 g/dL HCT 22.5 (L) 35.0 - 47.0 % MCV 93.8 80.0 - 99.0 FL  
 MCH 28.3 26.0 - 34.0 PG  
 MCHC 30.2 30.0 - 36.5 g/dL  
 RDW 18.3 (H) 11.5 - 14.5 % PLATELET 437 531 - 408 K/uL MPV 9.6 8.9 - 12.9 FL  
 NRBC 0.0 0  WBC ABSOLUTE NRBC 0.00 0.00 - 0.01 K/uL NEUTROPHILS 56 32 - 75 % LYMPHOCYTES 22 12 - 49 % MONOCYTES 17 (H) 5 - 13 % EOSINOPHILS 4 0 - 7 % BASOPHILS 1 0 - 1 % IMMATURE GRANULOCYTES 0 0.0 - 0.5 % ABS. NEUTROPHILS 2.7 1.8 - 8.0 K/UL  
 ABS. LYMPHOCYTES 1.1 0.8 - 3.5 K/UL  
 ABS. MONOCYTES 0.9 0.0 - 1.0 K/UL  
 ABS. EOSINOPHILS 0.2 0.0 - 0.4 K/UL  
 ABS. BASOPHILS 0.1 0.0 - 0.1 K/UL  
 ABS. IMM. GRANS. 0.0 0.00 - 0.04 K/UL  
 DF AUTOMATED    
 RBC COMMENTS ANISOCYTOSIS 1+ 
    
 RBC COMMENTS HYPOCHROMIA 1+ METABOLIC PANEL, COMPREHENSIVE  Collection Time: 20  8:07 AM  
 Result Value Ref Range Sodium 141 136 - 145 mmol/L Potassium 4.0 3.5 - 5.1 mmol/L Chloride 109 (H) 97 - 108 mmol/L  
 CO2 25 21 - 32 mmol/L Anion gap 7 5 - 15 mmol/L Glucose 141 (H) 65 - 100 mg/dL BUN 30 (H) 6 - 20 MG/DL Creatinine 1.32 (H) 0.55 - 1.02 MG/DL  
 BUN/Creatinine ratio 23 (H) 12 - 20 GFR est AA 50 (L) >60 ml/min/1.73m2 GFR est non-AA 41 (L) >60 ml/min/1.73m2 Calcium 9.0 8.5 - 10.1 MG/DL Bilirubin, total 0.5 0.2 - 1.0 MG/DL  
 ALT (SGPT) 17 12 - 78 U/L  
 AST (SGOT) 16 15 - 37 U/L Alk. phosphatase 65 45 - 117 U/L Protein, total 7.7 6.4 - 8.2 g/dL Albumin 3.0 (L) 3.5 - 5.0 g/dL Globulin 4.7 (H) 2.0 - 4.0 g/dL A-G Ratio 0.6 (L) 1.1 - 2.2 MAGNESIUM Collection Time: 11/05/20  8:07 AM  
Result Value Ref Range Magnesium 1.8 1.6 - 2.4 mg/dL CANCER ANTIGEN 125 Collection Time: 11/05/20  8:07 AM  
Result Value Ref Range CA-125 52 (H) 1.5 - 35.0 U/mL Pre-medications  were administered as ordered and chemotherapy was initiated. Medications Administered 0.9% sodium chloride infusion Admin Date 11/05/2020 Action New Bag Dose 25 mL/hr Rate 25 mL/hr Route IntraVENous Administered By 
Bertram Vásquez, RN  
  
  
 dexamethasone (DECADRON) 12 mg in 0.9% sodium chloride 50 mL, overfill volume 5 mL IVPB Admin Date 11/05/2020 Action Given Dose 
12 mg Rate 232 mL/hr Route IntraVENous Administered By 
Bertram Vásquez, RN  
  
  
 diphenhydrAMINE (BENADRYL) injection 50 mg   
 Admin Date 11/05/2020 Action Given Dose 50 mg Route IntraVENous Administered By 
Bertram Vásquez, RN  
  
  
 famotidine (PF) (PEPCID) 20 mg in 0.9% sodium chloride 10 mL injection Admin Date 11/05/2020 Action Given Dose 
20 mg Route IntraVENous Administered By 
Bertram Vásquez RN  
  
  
 heparin (porcine) pf 300-500 Units Admin Date 11/05/2020 Action Given Dose 
500 Units Route InterCATHeter Administered By 
Bertram Vásquez RN  
  
  
 PACLitaxeL (TAXOL) 197 mg in 0.9% sodium chloride 250 mL, overfill volume 25 mL chemo infusion Admin Date 11/05/2020 Action New Bag Dose 
197 mg Rate 
307.8 mL/hr Route IntraVENous Administered By 
Cosmo Whitmore RN  
  
  
 palonosetron HCl (ALOXI) injection 0.25 mg   
 Admin Date 11/05/2020 Action Given Dose 0.25 mg Route IntraVENous Administered By 
Cosmo Whitmore RN  
  
  
 saline peripheral flush soln 10 mL Admin Date 11/05/2020 Action Given Dose 
10 mL Route InterCATHeter Administered By 
Cosmo Whitmore RN  
  
  
  
 
 
 
7438 Patient tolerated treatment well. Port maintained positive blood return throughout treatment. Port flushed, heparinized and de accessed per protocol. Patient was discharged from [facility] Future Appointments Date Time Provider Gabriel Phillip 11/12/2020  8:00 AM A1 [facility] [address]   
11/19/2020  8:00 AM A1 [facility] [address]  
 
 
 
Chai Albert RN November 5, 2020

## 2020-11-05 NOTE — PROGRESS NOTES
[facility], [address] 
P [phone]  F [phone] Office Visit Patient ID: 
Name: Leonardo Tillman MRN: [id number] : 1960/60 y.o. Visit date: 2020 LEONID Crocker is a 62 y.o.  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 The patient's previous treatment include primary debulking and adjuvant Taxol/carbo with retreatment in  for recurrence. Subsequent lines of chemo as outlined below. She has most recently started single agent weekly Taxol on 18 following progression with chest wall involvement while on PARPi. Most recent EOD imaging in 2020 shows a variable response to her prior therapy though with mostly increased disease including liver, mediastium and abd with progressive right hydronephrosis. She is s/p ureteral stent placement.   
   
 
OncTx History: 
12: MARAH/BSO/Cytoreductive surgery on 12 noted carcinomatosis/omental caking. 10/2012-2013: 6 cycles Carbo/Taxol 2014-2014: 6 Cycles carbo/Taxol 3/2015-5/13/15: 3 cycles Avastin/Doxil until progression 2015-2015 Weekly Topotecan  
2015-3/2016: Weekly Topotecan 2017-2017E[place] D1/D8 Q28 days for 6 cycles 2018: CT core needle bx chest wall mass: Metastatic high-grade serous carcinoma (see comment) Positive for malignancy. 2018-2018: Taxol weekly 2019: 10 fx 30cGy palliative RT to sternal lesion 2019 - 2020 [place]. Underwent right ureteral stent placement on 3/18/19. 
2020: CT CAP variable response to therapy with several lesions decreased in size. However a majority of the lesions have increased in size consistent with progression of disease with involvement of the mediastinum, liver masses, and peritoneal carcinomatosis. Increase right-sided hydronephrosis. Increasing . 8/2020 - initiated Taxol weekly 10/2020 - RLE DVT on eliquis CT Results (most recent): 
Results from [facility] Encounter encounter on 06/24/20 CT ABD PELV W CONT Narrative EXAM: CT CHEST W CONT, CT ABD PELV W CONT INDICATION: Ovarian cancer COMPARISON: 10/2/2019 CONTRAST: 100 mL of Isovue-370. TECHNIQUE:  
Following the uneventful intravenous administration of contrast, thin axial 
images were obtained through the chest, abdomen and pelvis. Coronal and sagittal 
reconstructions were generated. Oral contrast was administered. CT dose 
reduction was achieved through use of a standardized protocol tailored for this 
examination and automatic exposure control for dose modulation. FINDINGS:  
 
THYROID: No nodule. MEDIASTINUM: Left paratracheal adenopathy now measures 3.7 x 2.0 cm increased in 
size. Mass in AP window which extends superiorly into the left infraclavicular 
region and inferiorly into the left hilum is also increase in size. When 
measured in a similar fashion this measures 3.5 x 2.4 previously 3.0 x 2.9 cm. Right parasternal mass is decreased in size measuring 3.4 x 3.4 cm. Subcarinal 
adenopathy measures 2.8 x 2.3 cm increased in size. There is increased size of 
the nodular component adjacent to the xiphoid now measuring 2.6 x 3.3 cm. SWATI: Left hilar lymph node measures 3.2 x 2.4 cm previously 2.5 x 1.8 cm. THORACIC AORTA: No dissection or aneurysm. MAIN PULMONARY ARTERY: Normal in caliber. TRACHEA/BRONCHI: Patent. ESOPHAGUS: No wall thickening or dilatation. HEART: Normal in size. PLEURA: No effusion or pneumothorax. LUNGS: No nodule, mass, or airspace disease. LIVER: Multiple masses are noted and better visualized on current study 
secondary to the addition of intravenous contrast. Comparison lesions measure 
previously, there is a 3.5 x 2.8 cm mass in the right hepatic lobe series 3 
image 58 slightly increased in size. There is a 3.6 x 2.9 cm mass in the left hepatic lobe slightly decreased in size series 3 image 56. Left hepatic lobe 
mass series 3 image 45 measuring 4.0 x 2.8 cm slightly increased in size. There 
is extensive tumor infiltration in the liver hilum with narrowing of the main 
portal vein as well as the intrahepatic portal veins. The common hepatic artery 
is not well visualized and likely occluded with some collateral flow noted in 
the liver hilum. GALLBLADDER: Not visualized SPLEEN: Mass in the splenic hilum increased in size measuring 7.4 x 4.3 cm with 
invasion into the splenic hilum. PANCREAS: The pancreas is encased by tumor. ADRENALS: There is likely involvement of the left adrenal gland tumor. KIDNEYS: Right nephroureteral stent with mild to moderate right-sided 
hydronephrosis. STOMACH: Tumor encasing the gastric antrum. SMALL BOWEL: No dilatation or wall thickening. COLON: Tumor abutting the tumor encasing the rectosigmoid junction. Tumor 
abutting the transverse colon. APPENDIX: Unremarkable. PERITONEUM: Some free fluid noted in the pelvis. Increased size of mesenteric 
masses. Representative examples: 7.8 x 4.3 cm tumor anterior peritoneum series 3 
image 84. No pelvic tumor 7.5 x 4.8 cm series 3 image 100. RETROPERITONEUM:  
Right pelvic sidewall increased in size. Large right lateral lymph node 
measuring 3.1 x 2.0 cm not significantly changed. REPRODUCTIVE ORGANS: The visualized URINARY BLADDER: No mass or calculus. BONES: No destructive bone lesion. ADDITIONAL COMMENTS: N/A Impression IMPRESSION: 
1. There is some variable response to therapy with several lesions decreased in 
size. However a majority of the lesions have increased in size consistent with 
progression of disease with involvement of the mediastinum, liver masses, and 
peritoneal carcinomatosis. 2.  Increase right-sided hydronephrosis. [facility] CT 10/2019: 
FINDINGS: 
THYROID: Visualized gland is unremarkable. MEDIASTINUM: Upper left paratracheal node measures 2.2 x 3.4 cm (2, 8), 
previously 2.3 x 3.5 cm. Superior prevascular node measures 2.9 x 3.0 cm, 
previously 3.5 x 3.8 cm. Poorly delineated subcarinal node measures about 3.4 x 
4.8 cm, previously 3.6 x 4.8 cm. Precardiac nodes measure 1.9 x 2.5 cm and 1.7 x 
2.4 cm (2, 39), previously 2.1 x 2.7 cm and 2.0 x 2.0 cm. SWATI: No mass or lymphadenopathy. THORACIC AORTA: No dissection or aneurysm. MAIN PULMONARY ARTERY: Normal in caliber. TRACHEA/BRONCHI: Patent. ESOPHAGUS: No wall thickening or dilatation. HEART: Normal in size. PLEURA: No effusion or pneumothorax. LUNGS: No nodule, mass, or airspace disease. LIVER: Multiple hypodense lesions suspicious for metastases, measuring up to 2.5 
x 4.0 cm in segment 2 (2, 43) series, previously 2.8 x 3.7 cm, 3.2 x 4.6 cm in 
segment 3 (2, 55), previously 3.1 x 4.5 cm, and partially exophytic from the 
posterior inferior right lobe segment 6 measuring 2.5 x 3.5 cm, previously 2.8 x 
3.4 cm. GALLBLADDER: Unremarkable. SPLEEN: Central 1.9 x 3.1 cm hypodensity (2, 47), previously 1.9 x 2.3 cm. Otherwise unremarkable. PANCREAS: Inseparable from bulky central abdominal adenopathy with no identified 
ductal dilation. ADRENALS: Unremarkable. KIDNEYS: Right hydronephrosis has improved status post right nephroureteral 
stent placement. Kidneys otherwise appear unremarkable. STOMACH: Unremarkable. SMALL BOWEL: No dilatation or wall thickening. COLON: No dilation or wall thickening. APPENDIX: Unremarkable. PERITONEUM: Extensive peritoneal implants with representative largest lesions 
measuring 4.4 x 6.1 cm and the left adnexal region (2, 98), previously 4.3 x 5.7 
cm, 3.8 x 5.9 cm in the anterior midline (2, 81), previously 3.4 x 5.4 cm, and 
2.8 x 4.7 cm in the right abdomen (2, 78), previously 3.2 x 4.7 cm. RETROPERITONEUM: Extensive rocio hepatis, retroperitoneal, and pelvic sidewall adenopathy. Conglomerate nkechi mass in the rocio hepatis region measures 5.7 x 
11.1 cm (2, 58) is, previously 6.3 x 12.2 cm. Aortocaval node measures 3.7 x 5.3 
cm (2, 77), previously 4.5 x 5.4 cm. Right pelvic sidewall node measures 2.9 x 
4.2 cm (2, 103), previously 3.4 x 4.2 cm. Left external iliac node measures 3.0 
x 4.8 cm (2, 101), previously 3.3 x 4.8 cm. REPRODUCTIVE ORGANS: Uterus and ovaries are surgically absent. URINARY BLADDER: No mass or calculus. Distal end of right nephroureteral stent 
positioned within bladder lumen. BONES: Degenerative spine change. No acute fracture or aggressive lesion. ADDITIONAL COMMENTS: Mass centered at the right sternocostal junction measures 5.3 x 5.4 cm (2, 21), previously 4.8 x 5.7 cm. Post surgical changes in the 
anterior abdominal wall. Left Port-A-Cath in place. Bilateral inguinal 
adenopathy measuring 2.1 x 3.2 cm on the right (2, 115), previously 2.3 x 3.7 cm 
and 2.7 x 2.8 cm on the left (2, 108), previously 2.9 x 3.1 cm. . 
  
IMPRESSION: Overall response to disease with either decrease in size or no 
significant change in extensive nkechi metastases, hepatic metastases, right 
sternal metastasis, and peritoneal carcinomatosis as above. No new metastasis or 
progressive metastasis identified. Status post right nephroureteral stent with 
interval resolution of right hydronephrosis. SUBJECTIVE: 
Presents to [facility]. Doing well today. Had an episode of RLE swelling last week. Repeat US showed DVT (complete/partial) proximal RLE. She was diagnosed at [facility] [place] with DVT in early October. She remains on eliquis and swelling/discomfort has subsided. No CP/SOB. Continues to exercise. She denies any abd/chest/back pain currently. She ambulates at home with rollator or cane PRN. Goes up/down stairs slowly and unassisted currently. No weakness/light headedness. No falls. No N/V. Appetite stable.  She drinks 4 bottles water/day with gatorade. +BM daily to every other day with miralax. Stent changed last week. Two adult sons at home who cook for her. She is also fostering two boys 15 and 12. The older is moving out likely later this year to reunite with his mother. She is contemplating adopting the younger. Confirms if she passes, her son Satish will adopt him. She has discussed this with the adoption agency. She has chronic pedal/hand numbness/tingling due to taxol and DM. She wakes from sleep occasionally d/t this but resumes sleep easily. Follows with podiatrist. Denies any change in depression, continues medication. ROS 10 pt negative except as per above PMH: 
Past Medical History:  
Diagnosis Date  Adverse effect of anesthesia   
 sleep apnea cpap machine useage  Anemia  Arthritis DDD low back and knees  Asthma   
 uses albuterol prn only; none x 6 mos. Never hospitalized  BRCA negative 1/2013  Calculus of kidney  Chronic kidney disease   
 kidney stones  Chronic pain   
 knee pain bilat  CINV (chemotherapy-induced nausea and vomiting) 8/21/2014  Decreased hearing, right PATIENT STATES THIS IS DUE TO CHEMOTHERAPY  Diabetes ([address] Age 46 IDDM  Environmental allergies  Fibromyalgia  GERD (gastroesophageal reflux disease)   
 controlled with med  Hx of pulmonary embolus during pregnancy 1/18/2015  Hydronephrosis due to obstruction of ureter 3/18/2019  Hypertension  Ill-defined condition Sepsis 9-2015 -  SOURCE PORT  
 Ill-defined condition 2016  
 chemotherapy  Mass of chest wall 05/2018  Morbid obesity ([address]  Murmur, heart  Other and unspecified hyperlipidemia  Ovarian cancer ([address] 9/2012, 1/2014  
 serous, high grade, stage IIIc. s/p chemotx (has port)  Psychiatric disorder Depression/Anxiety  Rectal bleeding  Thromboembolus ([address] 1993 POST PARTUM; resolved- PELVIS  Thyroid disease HYPOTHYROID  Unspecified sleep apnea CPAP, Dr. Carley Pena PSH: 
Past Surgical History:  
Procedure Laterality Date  BREAST SURGERY PROCEDURE UNLISTED Lymph node in left breast 2017 Walter  
  X2  
 HX HYSTERECTOMY  2012 ROULA/BSO, tumor debulking, omentectomy for ovarian cancer  HX ORTHOPAEDIC Right   
 ankle-FX, R  
 HX ORTHOPAEDIC Right   
 FX R ARM  
 HX UROLOGICAL Right 2017 STENT FOR KIDNEY STONES  
 HX VASCULAR ACCESS  2015  
 right chest- port placed  HX VASCULAR ACCESS Left 2017 TETE CATH  
 
SOC: 
Social History Socioeconomic History  Marital status:  Spouse name: Not on file  Number of children: Not on file  Years of education: Not on file  Highest education level: Not on file Occupational History  Not on file Social Needs  Financial resource strain: Not on file  Food insecurity Worry: Not on file Inability: Not on file  Transportation needs Medical: Not on file Non-medical: Not on file Tobacco Use  Smoking status: Never Smoker  Smokeless tobacco: Never Used Substance and Sexual Activity  Alcohol use: Yes Comment: 1 drink per month  Drug use: No  
 Sexual activity: Yes Lifestyle  Physical activity Days per week: Not on file Minutes per session: Not on file  Stress: Not on file Relationships  Social connections Talks on phone: Not on file Gets together: Not on file Attends Anabaptist service: Not on file Active member of club or organization: Not on file Attends meetings of clubs or organizations: Not on file Relationship status: Not on file  Intimate partner violence Fear of current or ex partner: Not on file Emotionally abused: Not on file Physically abused: Not on file Forced sexual activity: Not on file Other Topics Concern  Not on file Social History Narrative Lives in [facility] alone.  passed away in 5/16 of a heart attack. Has 2 sons. Disability. 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 Enjoys swimming and going to the gym. Family History Family History Problem Relation Age of Onset  Hypertension Mother [facility] Arthritis-osteo Mother  Hypertension Father  Arthritis-osteo Father  Cancer Father PROSTATE  COPD Father  Hypertension Brother  Elevated Lipids Brother  Arthritis-osteo Brother  Hypertension Brother  Elevated Lipids Brother  Obesity Brother  Cancer Brother PROSTATE  Anesth Problems Son DELAYED AWAKENING  
 Diabetes Maternal Grandmother  Anesth Problems Paternal Grandmother PATIENT STATES GRANDMOTHER'S HEART STOPPED DURING SURGERY Medications: (reviewed) Current Outpatient Medications on File Prior to Encounter Medication Sig Dispense Refill  omega-3 acid ethyl esters (LOVAZA) 1 gram capsule TAKE 1 CAPSULE BY MOUTH TWICE A  Cap 2  
 traMADoL (ULTRAM) 50 mg tablet Take 50 mg by mouth every six (6) hours as needed for Pain.  apixaban (Eliquis) 5 mg tablet Take 5 mg by mouth two (2) times a day.  rosuvastatin (CRESTOR) 10 mg tablet Take 10 mg by mouth nightly.  doxazosin (CARDURA) 1 mg tablet Take  by mouth daily.  doxazosin (CARDURA) 1 mg tablet Take 1 mg by mouth daily.  SULFAMETHOXAZOLE-TRIMETHOPRIM PO Take  by mouth two (2) times a day. 7 days  dexAMETHasone (DECADRON) 4 mg tablet Take 2 tablets with breakfast the day before chemo and for 2 days after chemo 36 Tab 2  
 lidocaine-prilocaine (EMLA) topical cream Apply small amount over port area and cover with band aid one hour before chemo 30 g 3  
 ondansetron (ZOFRAN ODT) 4 mg disintegrating tablet Take 1 Tab by mouth every eight (8) hours as needed for Nausea.  Indications: nausea and vomiting caused by cancer drugs 30 Tab 2  
  predniSONE (DELTASONE) 50 mg tablet Take 1 tablet 13 hours prior to scan, take 1 tablet 7 hours prior to scan and take 1 tablet one hour prior to scan 3 Tab 0  [place] 100 mg cap Take 100 mg by mouth daily. 60 Cap 4  
 fluticasone propionate (FLONASE) 50 mcg/actuation nasal spray 2 SPRAYS IN BOTH NOSTRILS DAILY 1 Bottle 0  
 cycloSPORINE (RESTASIS) 0.05 % dpet  albuterol (PROVENTIL VENTOLIN) 2.5 mg /3 mL (0.083 %) nebu Take 2.5 mg by inhalation.  dextroamphetamine-amphetamine (ADDERALL) 20 mg tablet TAKE 1 TABLET BY MOUTH EVERY DAY  0  
 rosuvastatin (CRESTOR) 10 mg tablet TAKE 1 TABLET BY MOUTH EVERY DAY  0  
 telmisartan (MICARDIS) 20 mg tablet TAKE 1 TABLET BY MOUTH EVERY DAY 90 Tab 3  
 atorvastatin (LIPITOR) 20 mg tablet  BD ULTRA-FINE SHORT PEN NEEDLE 31 gauge x 5/16\" ndle USE TO INJECT INSULIN 5 TIMES DAILY 200 Pen Needle 11  
 insulin aspart U-100 (NOVOLOG FLEXPEN U-100 INSULIN) 100 unit/mL (3 mL) inpn INJECT 20 UNITS BEFORE EACH MEAL + 4 UNITS FOR EVERY 50 MG/DL ABOVE 150 MG/DL.  UNITS PER DAY 90 Adjustable Dose Pre-filled Pen Syringe 3  
 LEVEMIR FLEXTOUCH U-100 INSULN 100 unit/mL (3 mL) inpn INJECT 30 UNITS IN AM AND 50 UNITS AT NIGHT. INCREASE AS DIRECTED TO  UNITS/DAY. 90 Adjustable Dose Pre-filled Pen Syringe 3  DEXILANT 60 mg CpDB capsule (delayed release) TAKE 1 CAPSULE BY MOUTH EVERY DAY 90 Cap 3  
 LINZESS 145 mcg cap capsule TAKE 1 CAPSULE BY MOUTH EVERY DAYY 30 Cap 0  
 levothyroxine (SYNTHROID) 150 mcg tablet TAKE 1 TABLET BY MOUTH EVERY DAY BEFORE BREAKFAST 90 Tab 0  
 EPINEPHrine (EPIPEN) 0.3 mg/0.3 mL injection INJECT 0.3 ML BY INTRAMUSCULAR ROUTE ONCE AS NEEDED FOR UP TO 1 DOSE.  1  
 glucose blood VI test strips (TRUE METRIX GLUCOSE TEST STRIP) strip by Does Not Apply route See Admin Instructions. 30 Strip 5  
 lancets misc by Does Not Apply route daily.  True Metrix lancets- test blood sugar once per day 30 Each 5  
  Blood-Glucose Meter monitoring kit Check up to tid, as directed 1 Kit 0  
 azelastine (ASTELIN) 137 mcg (0.1 %) nasal spray 1 [place] by Both Nostrils route daily as needed. Use in each nostril as directed  insulin detemir U-100 (LEVEMIR) 100 unit/mL injection 20 Units by SubCUTAneous route daily (with lunch).  montelukast (SINGULAIR) 10 mg tablet Take 10 mg by mouth nightly.  ascorbic acid, vitamin C, (VITAMIN C) 1,000 mg tablet Take 1,000 mg by mouth daily.  albuterol (PROVENTIL VENTOLIN) 2.5 mg /3 mL (0.083 %) nebulizer solution 2.5 mg by Nebulization route every four (4) hours as needed for Wheezing.  Liraglutide (VICTOZA) 0.6 mg/0.1 mL (18 mg/3 mL) pnij 1.8 mg by SubCUTAneous route daily (with lunch). 3 Pen 3  
 SYMBICORT 160-4.5 mcg/actuation HFAA Take 2 Puffs by inhalation two (2) times daily as needed. 2 Inhaler 3  clonazePAM (KLONOPIN) 0.5 mg tablet Take 0.5 mg by mouth nightly as needed.  sertraline (ZOLOFT) 50 mg tablet TAKE 1 TABLET BY MOUTH as needed  1  cholecalciferol, VITAMIN D3, (VITAMIN D3) 5,000 unit tab tablet Take 5,000 Units by mouth daily.  furosemide (LASIX) 40 mg tablet Take 40 mg by mouth every other day. 3 No current facility-administered medications on file prior to encounter. Allergies: (reviewed) Allergies Allergen Reactions  Carboplatin Other (comments) Patient developed shortness of breath, felt faint, coughing, skin flushed and \"itchy\". This occurred on the patients 8th treatment.  Iodinated Contrast Media Rash 13 hr pre-medication prior to IV contrast.  
Patient has done well with 1 hr pre-medication of (Solu-Medrol) 40mg &  (Benadryl) 50mg.  Lipitor [Atorvastatin] Myalgia  Shellfish Containing Products Hives  Tape [Adhesive] Itching and Other (comments) \"op site-- clear,thin tape\"--caused blister  Tomato Hives  Iodine And Iodide Containing Products Rash  Olmesartan Other (comments)  Losartan Other (comments)  
  headaches  Percocet [Oxycodone-Acetaminophen] Other (comments) Fever; however, current home med OBJECTIVE Physical Exam 
Visit Vitals BP (!) 156/83 (BP 1 Location: Right arm) Pulse 86 Temp 97.3 °F (36.3 °C) Resp 18 Ht 5' 8\" (1.727 m) Wt 258 lb 9.6 oz (117.3 kg) LMP 09/07/2011 Breastfeeding No  
BMI 39.32 kg/m² GENERAL BRIAN: Conversant, pleasant, alert, oriented. NAD HEENT: Oropharynx clear. Sclera anicteric. No JVD. No adenopathy RESPIRATORY: Lung CTA, No rales/rhonchi. Adequate respiratory effort. CARDIOVASC: Reg rate/rhythm, stable murmur low grade. No visible chest lesion. GASTROINT: soft, non-tender, without masses or organomegaly, no fluid wave. Habitus limits exam.  
EXTREMITIES: bilat 1+ edema/girth nonpitting < RLE. Lightly tender R ankle w/o erythema. Foot warm, mobile, sensate. ALCON SURVEY: Negative NEURO: Grossly intact, no acute deficit. Oriented. Not depressed. Not agitated. Wt Readings from Last 3 Encounters:  
11/05/20 258 lb 9.6 oz (117.3 kg) 10/28/20 258 lb 6.4 oz (117.2 kg) 10/22/20 251 lb 5.2 oz (114 kg) DATE REVIEW as available: 
Lab Results Component Value Date/Time WBC 5.0 11/05/2020 08:07 AM  
 Hemoglobin (POC) 10.5 (L) 03/20/2017 12:15 PM  
 HGB 6.8 (L) 11/05/2020 08:07 AM  
 Hematocrit (POC) 31 (L) 03/20/2017 12:15 PM  
 HCT 22.5 (L) 11/05/2020 08:07 AM  
 PLATELET 866 02/74/1351 08:07 AM  
 MCV 93.8 11/05/2020 08:07 AM  
 
Lab Results Component Value Date/Time  
 ABS. NEUTROPHILS 2.7 11/05/2020 08:07 AM  
 
Lab Results Component Value Date/Time  Sodium 141 11/05/2020 08:07 AM  
 Potassium 4.0 11/05/2020 08:07 AM  
 Chloride 109 (H) 11/05/2020 08:07 AM  
 CO2 25 11/05/2020 08:07 AM  
 Anion gap 7 11/05/2020 08:07 AM  
 Glucose 141 (H) 11/05/2020 08:07 AM  
 BUN 30 (H) 11/05/2020 08:07 AM  
 Creatinine 1.32 (H) 11/05/2020 08:07 AM  
 BUN/Creatinine ratio 23 (H) 11/05/2020 08:07 AM  
 GFR est AA 50 (L) 11/05/2020 08:07 AM  
 GFR est non-AA 41 (L) 11/05/2020 08:07 AM  
 Calcium 9.0 11/05/2020 08:07 AM  
 Bilirubin, total 0.5 11/05/2020 08:07 AM  
 Alk. phosphatase 65 11/05/2020 08:07 AM  
 Protein, total 7.7 11/05/2020 08:07 AM  
 Albumin 3.0 (L) 11/05/2020 08:07 AM  
 Globulin 4.7 (H) 11/05/2020 08:07 AM  
 A-G Ratio 0.6 (L) 11/05/2020 08:07 AM  
 ALT (SGPT) 17 11/05/2020 08:07 AM  
 
CA-125 Date Value Ref Range Status 10/08/2020 116 (H) 1.5 - 35.0 U/mL Final  
  Comment:  
  ** Note new reference range and method ** Results may vary depending on . Method used is [employer] 
  
09/03/2020 187 (H) 1.5 - 35.0 U/mL Final  
  Comment:  
  ** Note new reference range and method ** Results may vary depending on . Method used is [employer] 
  
06/11/2020 268 (H) 1.5 - 35.0 U/mL Final  
  Comment:  
  ** Note new reference range and method ** Results may vary depending on . Method used is [employer] 
  
05/14/2020 233 (H) 1.5 - 35.0 U/mL Final  
  Comment:  
  ** Note new reference range and method ** Results may vary depending on . Method used is [employer] 
  
03/19/2020 226 (H) 1.5 - 35.0 U/mL Final  
  Comment:  
  ** Note new reference range and method ** Results may vary depending on . Method used is [employer] ECHO 1/2020 · Normal cavity size and systolic function (ejection fraction normal). Mild concentric hypertrophy. Estimated left ventricular ejection fraction is 55 - 60%. Visually measured ejection fraction. No regional wall motion abnormality noted. · Mildly dilated left atrium. · Mild aortic valve sclerosis. · Trace mitral valve regurgitation is present. IMPRESSION AND PLAN: 
62 y.o. with diffusely metastatic ovarian cancer. Continues on palliative chemo, retrial taxol C4 Taxol today. Responsive chemically. CKD stage 2-3 - stable. Continue home hydration. Right hydronephrosis - stent in place. Exchanged October 2020. Liver mets -  asx Pain resolved - no longer requiring opioid therapy. RLE DVT acute/chronic - stable on eliquis. If Issues persist or PE present, consider lovenox/filter. Anemia - persistent d/t multiple causes, chemo hx. VSS, no change from her baseline. Monitoring SOB/debility - hx Asthma, chronic due to condition, anemia. Consider home bed d/t stairs. Discussed concerns with her. Home PT for now. IDDM2/SHABANA/depression - controlled overall. Continue CPAP 
CIPN/diabetic polyneuropathy - not in pain, monitor. Discussed home safety. Wt loss, prot smiley malnutrition - supplements/alimentation as best as able. Psychosocial - she has some fear about the future. She is well supported, isaac remains very important. Monitor depressive sx mgmt. Discussed home life and plans for the future. Plans for adopting her foster child with support of her son Marshall Chavez PA-C 
11/5/2020

## 2020-11-12 NOTE — PROGRESS NOTES
[address] [facility], [address] [place], [address] 
P [phone]  F [phone] Office Visit Patient ID: 
Name: Vallarie Bars. Leopoldo Ludwig MRN: [id number] : 1960/60 y.o. Visit date: 2020 LEONID Atkinson is a 62 y.o.  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 The patient's previous treatment include primary debulking and adjuvant Taxol/carbo with retreatment in  for recurrence. Subsequent lines of chemo as outlined below. She has most recently started single agent weekly Taxol on 18 following progression with chest wall involvement while on PARPi. Most recent EOD imaging in 2020 shows a variable response to her prior therapy though with mostly increased disease including liver, mediastium and abd with progressive right hydronephrosis. She is s/p stent placement.   
   
SUBJECTIVE: 
Ms. Leopoldo Ludwig presents today for chemotherapy. She reports persistent fatigue which has been fairly stable. She states that she has had increased dizziness since her last chemo. Has had a near fall on at least one occasion. Has felt lightheaded and complains of seeing \"black spots\" in her vision. Is taking Eliquis for a DVT. No increased bleeding. No hematuria or blood in stool. Reports occasional vaginal spotting. No shortness of breath or chest pain. She denies nausea/vomiting. Has maintained her appetite. Is tolerating fluids. Regular bowel movements. No abdominal pain. Numbness/tingling in hands and feet about the same. Seeing a podiatrist. 
 
 
Neetu Mcclain  pt negative except as per above PMH: 
Past Medical History:  
Diagnosis Date  Adverse effect of anesthesia   
 sleep apnea cpap machine useage  Anemia  Arthritis DDD low back and knees  Asthma   
 uses albuterol prn only; none x 6 mos. Never hospitalized  BRCA negative 2013  Calculus of kidney  Chronic kidney disease   
 kidney stones  Chronic pain   
 knee pain bilat  CINV (chemotherapy-induced nausea and vomiting) 2014  Decreased hearing, right PATIENT STATES THIS IS DUE TO CHEMOTHERAPY  Diabetes ([address] Age 46 IDDM  Environmental allergies  Fibromyalgia  GERD (gastroesophageal reflux disease)   
 controlled with med  Hx of pulmonary embolus during pregnancy 2015  Hydronephrosis due to obstruction of ureter 3/18/2019  Hypertension  Ill-defined condition Sepsis  -  SOURCE PORT  
 Ill-defined condition 2016  
 chemotherapy  Mass of chest wall 2018  Morbid obesity ([address]  Murmur, heart  Other and unspecified hyperlipidemia  Ovarian cancer ([address] 2012, 2014  
 serous, high grade, stage IIIc. s/p chemotx (has port)  Psychiatric disorder Depression/Anxiety  Rectal bleeding  Thromboembolus ([address]  POST PARTUM; resolved- PELVIS  Thyroid disease HYPOTHYROID  Unspecified sleep apnea CPAP, Dr. Sukhjinder Reyes PSH: 
Past Surgical History:  
Procedure Laterality Date  BREAST SURGERY PROCEDURE UNLISTED Lymph node in left breast 2017 [place]  
  X2  
 HX HYSTERECTOMY  2012 ROULA/BSO, tumor debulking, omentectomy for ovarian cancer  HX ORTHOPAEDIC Right   
 ankle-FX, R  
 HX ORTHOPAEDIC Right   
 FX R ARM  
 HX UROLOGICAL Right  STENT FOR KIDNEY STONES  
 HX VASCULAR ACCESS  2015  
 right chest- port placed  HX VASCULAR ACCESS Left 2017 [facility] CATH  
 
SOC: 
Social History Socioeconomic History  Marital status:  Spouse name: Not on file  Number of children: Not on file  Years of education: Not on file  Highest education level: Not on file Occupational History  Not on file Social Needs  Financial resource strain: Not on file  Food insecurity Worry: Not on file Inability: Not on file  Transportation needs Medical: Not on file Non-medical: Not on file Tobacco Use  Smoking status: Never Smoker  Smokeless tobacco: Never Used Substance and Sexual Activity  Alcohol use: Yes Comment: 1 drink per month  Drug use: No  
 Sexual activity: Yes Lifestyle  Physical activity Days per week: Not on file Minutes per session: Not on file  Stress: Not on file Relationships  Social connections Talks on phone: Not on file Gets together: Not on file Attends [employer] service: Not on file Active member of club or organization: Not on file Attends meetings of clubs or organizations: Not on file Relationship status: Not on file  Intimate partner violence Fear of current or ex partner: Not on file Emotionally abused: Not on file Physically abused: Not on file Forced sexual activity: Not on file Other Topics Concern  Not on file Social History Narrative Lives in [facility] alone.  passed away in 5/16 of a heart attack. Has 2 sons. Disability. 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 Enjoys swimming and going to the gym. Family History Family History Problem Relation Age of Onset  Hypertension Mother Dani Arthritis-osteo Mother  Hypertension Father  Arthritis-osteo Father  Cancer Father PROSTATE  COPD Father  Hypertension Brother  Elevated Lipids Brother  Arthritis-osteo Brother  Hypertension Brother  Elevated Lipids Brother  Obesity Brother  Cancer Brother PROSTATE  Anesth Problems Son DELAYED AWAKENING  
 Diabetes Maternal Grandmother  Anesth Problems Paternal Grandmother PATIENT STATES GRANDMOTHER'S HEART STOPPED DURING SURGERY Medications: (reviewed) Current Outpatient Medications on File Prior to Encounter Medication Sig Dispense Refill  omega-3 acid ethyl esters (LOVAZA) 1 gram capsule TAKE 1 CAPSULE BY MOUTH TWICE A  Cap 2  
 traMADoL (ULTRAM) 50 mg tablet Take 50 mg by mouth every six (6) hours as needed for Pain.  apixaban (Eliquis) 5 mg tablet Take 5 mg by mouth two (2) times a day.  rosuvastatin (CRESTOR) 10 mg tablet Take 10 mg by mouth nightly.  doxazosin (CARDURA) 1 mg tablet Take  by mouth daily.  doxazosin (CARDURA) 1 mg tablet Take 1 mg by mouth daily.  SULFAMETHOXAZOLE-TRIMETHOPRIM PO Take  by mouth two (2) times a day. 7 days  dexAMETHasone (DECADRON) 4 mg tablet Take 2 tablets with breakfast the day before chemo and for 2 days after chemo 36 Tab 2  
 lidocaine-prilocaine (EMLA) topical cream Apply small amount over port area and cover with band aid one hour before chemo 30 g 3  
 ondansetron (ZOFRAN ODT) 4 mg disintegrating tablet Take 1 Tab by mouth every eight (8) hours as needed for Nausea. Indications: nausea and vomiting caused by cancer drugs 30 Tab 2  predniSONE (DELTASONE) 50 mg tablet Take 1 tablet 13 hours prior to scan, take 1 tablet 7 hours prior to scan and take 1 tablet one hour prior to scan 3 Tab 0  Zejula 100 mg cap Take 100 mg by mouth daily. 60 Cap 4  
 fluticasone propionate (FLONASE) 50 mcg/actuation nasal spray 2 SPRAYS IN BOTH NOSTRILS DAILY 1 Bottle 0  
 cycloSPORINE (RESTASIS) 0.05 % dpet  albuterol (PROVENTIL VENTOLIN) 2.5 mg /3 mL (0.083 %) nebu Take 2.5 mg by inhalation.  dextroamphetamine-amphetamine (ADDERALL) 20 mg tablet TAKE 1 TABLET BY MOUTH EVERY DAY  0  
 rosuvastatin (CRESTOR) 10 mg tablet TAKE 1 TABLET BY MOUTH EVERY DAY  0  
 telmisartan (MICARDIS) 20 mg tablet TAKE 1 TABLET BY MOUTH EVERY DAY 90 Tab 3  
 atorvastatin (LIPITOR) 20 mg tablet  BD ULTRA-FINE SHORT PEN NEEDLE 31 gauge x 5/16\" ndle USE TO INJECT INSULIN 5 TIMES DAILY 200 Pen Needle 11  
 insulin aspart U-100 (NOVOLOG FLEXPEN U-100 INSULIN) 100 unit/mL (3 mL) inpn INJECT 20 UNITS BEFORE EACH MEAL + 4 UNITS FOR EVERY 50 MG/DL ABOVE 150 MG/DL.  UNITS PER DAY 90 Adjustable Dose Pre-filled Pen Syringe 3  
 LEVEMIR FLEXTOUCH U-100 INSULN 100 unit/mL (3 mL) inpn INJECT 30 UNITS IN AM AND 50 UNITS AT NIGHT. INCREASE AS DIRECTED TO  UNITS/DAY. 90 Adjustable Dose Pre-filled Pen Syringe 3  DEXILANT 60 mg CpDB capsule (delayed release) TAKE 1 CAPSULE BY MOUTH EVERY DAY 90 Cap 3  
 LINZESS 145 mcg cap capsule TAKE 1 CAPSULE BY MOUTH EVERY DAYY 30 Cap 0  
 levothyroxine (SYNTHROID) 150 mcg tablet TAKE 1 TABLET BY MOUTH EVERY DAY BEFORE BREAKFAST 90 Tab 0  
 EPINEPHrine (EPIPEN) 0.3 mg/0.3 mL injection INJECT 0.3 ML BY INTRAMUSCULAR ROUTE ONCE AS NEEDED FOR UP TO 1 DOSE.  1  
 glucose blood VI test strips (TRUE METRIX GLUCOSE TEST STRIP) strip by Does Not Apply route See Admin Instructions. 30 Strip 5  
 lancets misc by Does Not Apply route daily. True Metrix lancets- test blood sugar once per day 30 Each 5  Blood-Glucose Meter monitoring kit Check up to tid, as directed 1 Kit 0  
 azelastine (ASTELIN) 137 mcg (0.1 %) nasal spray 1 [place] by Both Nostrils route daily as needed. Use in each nostril as directed  insulin detemir U-100 (LEVEMIR) 100 unit/mL injection 20 Units by SubCUTAneous route daily (with lunch).  montelukast (SINGULAIR) 10 mg tablet Take 10 mg by mouth nightly.  ascorbic acid, vitamin C, (VITAMIN C) 1,000 mg tablet Take 1,000 mg by mouth daily.  albuterol (PROVENTIL VENTOLIN) 2.5 mg /3 mL (0.083 %) nebulizer solution 2.5 mg by Nebulization route every four (4) hours as needed for Wheezing.  Liraglutide (VICTOZA) 0.6 mg/0.1 mL (18 mg/3 mL) pnij 1.8 mg by SubCUTAneous route daily (with lunch). 3 Pen 3  
 SYMBICORT 160-4.5 mcg/actuation HFAA Take 2 Puffs by inhalation two (2) times daily as needed. 2 Inhaler 3  clonazePAM (KLONOPIN) 0.5 mg tablet Take 0.5 mg by mouth nightly as needed.  sertraline (ZOLOFT) 50 mg tablet TAKE 1 TABLET BY MOUTH as needed  1  cholecalciferol, VITAMIN D3, (VITAMIN D3) 5,000 unit tab tablet Take 5,000 Units by mouth daily.  furosemide (LASIX) 40 mg tablet Take 40 mg by mouth every other day. 3 No current facility-administered medications on file prior to encounter. Allergies: (reviewed) Allergies Allergen Reactions  Carboplatin Other (comments) Patient developed shortness of breath, felt faint, coughing, skin flushed and \"itchy\". This occurred on the patients 8th treatment.  Iodinated Contrast Media Rash 13 hr pre-medication prior to IV contrast.  
Patient has done well with 1 hr pre-medication of (Solu-Medrol) 40mg &  (Benadryl) 50mg.  Lipitor [Atorvastatin] Myalgia  Shellfish Containing Products Hives  Tape [Adhesive] Itching and Other (comments) \"op site-- clear,thin tape\"--caused blister  Tomato Hives  Iodine And Iodide Containing Products Rash  Olmesartan Other (comments)  Losartan Other (comments)  
  headaches  Percocet [Oxycodone-Acetaminophen] Other (comments) Fever; however, current home med OBJECTIVE Physical Exam 
Visit Vitals BP (!) 140/75 Pulse 92 Temp 97.5 °F (36.4 °C) Resp 18 Ht 5' 8\" (1.727 m) Wt 262 lb 9.6 oz (119.1 kg) LMP 09/07/2011 BMI 39.93 kg/m² GENERAL BRIAN: Conversant, pleasant, alert, oriented. NAD HEENT: Oropharynx clear. Sclera anicteric. No JVD. No adenopathy RESPIRATORY: Lung CTA, unlabored. Adequate respiratory effort. CARDIOVASC: Reg rate/rhythm, stable low grade murmur noted. Tommas Baptise GASTROINT: soft, non-tender, without masses or organomegaly EXTREMITIES: bilat 1+ edema/girth nonpitting ALCON SURVEY: Negative NEURO: Grossly intact, no deficits noted. Wt Readings from Last 3 Encounters:  
11/12/20 262 lb 9.6 oz (119.1 kg) 11/05/20 258 lb 9.6 oz (117.3 kg) 10/28/20 258 lb 6.4 oz (117.2 kg) DATE REVIEW as available: Lab Results Component Value Date/Time WBC 7.6 11/12/2020 08:12 AM  
 Hemoglobin (POC) 10.5 (L) 03/20/2017 12:15 PM  
 HGB 6.5 (L) 11/12/2020 08:12 AM  
 Hematocrit (POC) 31 (L) 03/20/2017 12:15 PM  
 HCT 21.7 (L) 11/12/2020 08:12 AM  
 PLATELET 520 15/00/0042 08:12 AM  
 MCV 93.9 11/12/2020 08:12 AM  
 
Lab Results Component Value Date/Time  
 ABS. NEUTROPHILS 6.2 11/12/2020 08:12 AM  
 
Lab Results Component Value Date/Time Sodium 143 11/12/2020 08:12 AM  
 Potassium 4.0 11/12/2020 08:12 AM  
 Chloride 113 (H) 11/12/2020 08:12 AM  
 CO2 24 11/12/2020 08:12 AM  
 Anion gap 6 11/12/2020 08:12 AM  
 Glucose 163 (H) 11/12/2020 08:12 AM  
 BUN 32 (H) 11/12/2020 08:12 AM  
 Creatinine 1.30 (H) 11/12/2020 08:12 AM  
 BUN/Creatinine ratio 25 (H) 11/12/2020 08:12 AM  
 GFR est AA 51 (L) 11/12/2020 08:12 AM  
 GFR est non-AA 42 (L) 11/12/2020 08:12 AM  
 Calcium 8.8 11/12/2020 08:12 AM  
 Bilirubin, total 0.5 11/05/2020 08:07 AM  
 Alk. phosphatase 65 11/05/2020 08:07 AM  
 Protein, total 7.7 11/05/2020 08:07 AM  
 Albumin 3.0 (L) 11/05/2020 08:07 AM  
 Globulin 4.7 (H) 11/05/2020 08:07 AM  
 A-G Ratio 0.6 (L) 11/05/2020 08:07 AM  
 ALT (SGPT) 17 11/05/2020 08:07 AM  
 
CA-125 Date Value Ref Range Status 11/05/2020 52 (H) 1.5 - 35.0 U/mL Final  
  Comment:  
  ** Note new reference range and method ** Results may vary depending on . Method used is [employer] 10/08/2020 116 (H) 1.5 - 35.0 U/mL Final  
  Comment:  
  ** Note new reference range and method ** Results may vary depending on . Method used is [employer]media 
  
09/03/2020 187 (H) 1.5 - 35.0 U/mL Final  
  Comment:  
  ** Note new reference range and method ** Results may vary depending on . Method used is Intermedia 
  
06/11/2020 268 (H) 1.5 - 35.0 U/mL Final  
  Comment:  
  ** Note new reference range and method ** Results may vary depending on . Method used is [employer] 
  
05/14/2020 233 (H) 1.5 - 35.0 U/mL Final  
  Comment:  
  ** Note new reference range and method ** Results may vary depending on . Method used is [employer] CT Results (most recent): 
Results from [facility] Encounter encounter on 06/24/20 CT ABD PELV W CONT Narrative EXAM: CT CHEST W CONT, CT ABD PELV W CONT INDICATION: Ovarian cancer COMPARISON: 10/2/2019 CONTRAST: 100 mL of Isovue-370. TECHNIQUE:  
Following the uneventful intravenous administration of contrast, thin axial 
images were obtained through the chest, abdomen and pelvis. Coronal and sagittal 
reconstructions were generated. Oral contrast was administered. CT dose 
reduction was achieved through use of a standardized protocol tailored for this 
examination and automatic exposure control for dose modulation. FINDINGS:  
 
THYROID: No nodule. MEDIASTINUM: Left paratracheal adenopathy now measures 3.7 x 2.0 cm increased in 
size. Mass in AP window which extends superiorly into the left infraclavicular 
region and inferiorly into the left hilum is also increase in size. When 
measured in a similar fashion this measures 3.5 x 2.4 previously 3.0 x 2.9 cm. Right parasternal mass is decreased in size measuring 3.4 x 3.4 cm. Subcarinal 
adenopathy measures 2.8 x 2.3 cm increased in size. There is increased size of 
the nodular component adjacent to the xiphoid now measuring 2.6 x 3.3 cm. SWATI: Left hilar lymph node measures 3.2 x 2.4 cm previously 2.5 x 1.8 cm. THORACIC AORTA: No dissection or aneurysm. MAIN PULMONARY ARTERY: Normal in caliber. TRACHEA/BRONCHI: Patent. ESOPHAGUS: No wall thickening or dilatation. HEART: Normal in size. PLEURA: No effusion or pneumothorax. LUNGS: No nodule, mass, or airspace disease. LIVER: Multiple masses are noted and better visualized on current study secondary to the addition of intravenous contrast. Comparison lesions measure 
previously, there is a 3.5 x 2.8 cm mass in the right hepatic lobe series 3 
image 58 slightly increased in size. There is a 3.6 x 2.9 cm mass in the left 
hepatic lobe slightly decreased in size series 3 image 56. Left hepatic lobe 
mass series 3 image 45 measuring 4.0 x 2.8 cm slightly increased in size. There 
is extensive tumor infiltration in the liver hilum with narrowing of the main 
portal vein as well as the intrahepatic portal veins. The common hepatic artery 
is not well visualized and likely occluded with some collateral flow noted in 
the liver hilum. GALLBLADDER: Not visualized SPLEEN: Mass in the splenic hilum increased in size measuring 7.4 x 4.3 cm with 
invasion into the splenic hilum. PANCREAS: The pancreas is encased by tumor. ADRENALS: There is likely involvement of the left adrenal gland tumor. KIDNEYS: Right nephroureteral stent with mild to moderate right-sided 
hydronephrosis. STOMACH: Tumor encasing the gastric antrum. SMALL BOWEL: No dilatation or wall thickening. COLON: Tumor abutting the tumor encasing the rectosigmoid junction. Tumor 
abutting the transverse colon. APPENDIX: Unremarkable. PERITONEUM: Some free fluid noted in the pelvis. Increased size of mesenteric 
masses. Representative examples: 7.8 x 4.3 cm tumor anterior peritoneum series 3 
image 84. No pelvic tumor 7.5 x 4.8 cm series 3 image 100. RETROPERITONEUM:  
Right pelvic sidewall increased in size. Large right lateral lymph node 
measuring 3.1 x 2.0 cm not significantly changed. REPRODUCTIVE ORGANS: The visualized URINARY BLADDER: No mass or calculus. BONES: No destructive bone lesion. ADDITIONAL COMMENTS: N/A Impression IMPRESSION: 
1. There is some variable response to therapy with several lesions decreased in 
size. However a majority of the lesions have increased in size consistent with progression of disease with involvement of the mediastinum, liver masses, and 
peritoneal carcinomatosis. 2.  Increase right-sided hydronephrosis. [place] IMPRESSION AND PLAN: 
62 y.o. with diffusely metastatic ovarian cancer. Continues on palliative chemo, retrial taxol Proceed with Cycle 4, Day 8. Tolerated previous cycles with little adverse effects. CKD stage 2-3 - kidney function stable. Creatinine 1.30 today. Right hydronephrosis - stent in place. Liver mets -  Monitor liver studies. She remains asymptomatic Chronic pain - much improved. Not requiring pain medication currently. Anemia - persistent due to multiple causes, chemo hx. Hgb 6.5 today. She complains of fatigue, dizziness. Considering orthostatic complaints, will transfuse 2 units today. If unable to finished 2 units today, she can receive the second unit tomorrow. IDDM2/SHABANA/depression - controlled overall. Continue CPAP 
CIPN/diabetic polyneuropathy - not in pain, monitor. Being followed by podiatrist.  EMG shows pattern consistent with diabetic neuropathy vs chemo induced vs mixed etiology. Psychosocial - She is well supported, isaac remains very important. No complaints of depression. Will monitor. Swelling in both legs though not bothersome. Can consider lymphedema clinic if needed. Remains close to her family. Patient Active Problem List  
Diagnosis Code  Carcinomatosis ([address] C80.0  Controlled type 2 diabetes mellitus without complication, with long-term current use of insulin (HCC) E11.9, Z79.4  Morbid obesity (HCC) E66.01  
 Abnormal mammogram R92.8  SHABANA on CPAP G47.33, Z99.89  Dyslipidemia (high LDL; low HDL) E78.5  Acute deep vein thrombosis (DVT) of proximal vein of right lower extremity (HCC) I82.4Y1  Port-A-Cath in place Z95.828  
 Ovarian cancer ([address] C56.9  Hyperglycemia due to type 2 diabetes mellitus ([address] E11.65  Renal calculi N20.0  Pulmonary hypertension, mild (HCC) I27.20  Lymphadenopathy, mediastinal R59.0  Type 2 diabetes mellitus with nephropathy (HCC) E11.21  
 On antineoplastic chemotherapy Z79.899  Reactive depression F32.9  Contrast media allergy Z91.041  
 Diabetes ([facility] E11.9  Hypothyroidism E03.9  Anemia D64.9  Hypertension I10  
 Pain due to malignant neoplasm metastatic to bone (HCC) G89.3, C79.51  
 Mass of chest wall R22.2  
 S/P ureteral stent placement Z96.0  Anemia associated with chemotherapy D64.81, T45.1X5A  Lymphedema of both lower extremities I89.0 Chana Mills PA-C 
11/12/2020

## 2020-11-12 NOTE — PROGRESS NOTES
[facility] Chemo Progress Note Date: 2020 Name: Arleen Turcios MRN: [id number] : 1960 
 
0800 Ms. Alexandria Turcios Arrived to [facility] for  d1 C5 Paclitaxel/blood ambulatory in stable condition. Assessment was completed, no acute issues at this time, no new complaints voiced. Port accessed with positive blood return. Labs drawn and sent for processing. Alberta Nino came to speak with patient regarding the need for blood in addition to treatment. Type and cross drawn and sent. Due to shortage another CBC will need to be drawn prior to second infusion as well as type and cross. Patient is scheduled for peds [facility] if needed on 20 @ 8am. Patient is aware. Chemotherapy Flowsheet 2020 Cycle C4D8 Date 2020 Drug / Regimen Taxol Dosage -  
Pre Hydration -  
Pre Meds given Notes given Ms. Moore's vitals were reviewed. Patient Vitals for the past 12 hrs: 
 Temp Pulse Resp BP  
20 1550 97.1 °F (36.2 °C) 72 18 136/76  
20 1520 97.2 °F (36.2 °C) 86 18 (!) 142/72  
20 1450 97.1 °F (36.2 °C) 73  105/63  
20 1435 97.1 °F (36.2 °C) 72  101/60  
20 1420 97 °F (36.1 °C) 82  115/71  
20 1405 97.1 °F (36.2 °C) 87 18 121/70  
20 0802 97.5 °F (36.4 °C) 92 18 (!) 140/75 Lab results were obtained and reviewed. Recent Results (from the past 12 hour(s)) CBC WITH AUTOMATED DIFF Collection Time: 20  8:12 AM  
Result Value Ref Range WBC 7.6 3.6 - 11.0 K/uL  
 RBC 2.31 (L) 3.80 - 5.20 M/uL HGB 6.5 (L) 11.5 - 16.0 g/dL HCT 21.7 (L) 35.0 - 47.0 % MCV 93.9 80.0 - 99.0 FL  
 MCH 28.1 26.0 - 34.0 PG  
 MCHC 30.0 30.0 - 36.5 g/dL  
 RDW 19.0 (H) 11.5 - 14.5 % PLATELET 022 924 - 473 K/uL MPV 9.5 8.9 - 12.9 FL  
 NRBC 1.2 (H) 0  WBC ABSOLUTE NRBC 0.09 (H) 0.00 - 0.01 K/uL NEUTROPHILS 81 (H) 32 - 75 % BAND NEUTROPHILS 2 0 - 6 % LYMPHOCYTES 10 (L) 12 - 49 % MONOCYTES 1 (L) 5 - 13 % EOSINOPHILS 5 0 - 7 % BASOPHILS 1 0 - 1 % IMMATURE GRANULOCYTES 0 %  
 ABS. NEUTROPHILS 6.2 1.8 - 8.0 K/UL  
 ABS. LYMPHOCYTES 0.8 0.8 - 3.5 K/UL  
 ABS. MONOCYTES 0.1 0.0 - 1.0 K/UL  
 ABS. EOSINOPHILS 0.4 0.0 - 0.4 K/UL  
 ABS. BASOPHILS 0.1 0.0 - 0.1 K/UL  
 ABS. IMM. GRANS. 0.0 K/UL  
 DF MANUAL    
 RBC COMMENTS ANISOCYTOSIS 
1+ METABOLIC PANEL, BASIC Collection Time: 11/12/20  8:12 AM  
Result Value Ref Range Sodium 143 136 - 145 mmol/L Potassium 4.0 3.5 - 5.1 mmol/L Chloride 113 (H) 97 - 108 mmol/L  
 CO2 24 21 - 32 mmol/L Anion gap 6 5 - 15 mmol/L Glucose 163 (H) 65 - 100 mg/dL BUN 32 (H) 6 - 20 MG/DL Creatinine 1.30 (H) 0.55 - 1.02 MG/DL  
 BUN/Creatinine ratio 25 (H) 12 - 20 GFR est AA 51 (L) >60 ml/min/1.73m2 GFR est non-AA 42 (L) >60 ml/min/1.73m2 Calcium 8.8 8.5 - 10.1 MG/DL  
TYPE & SCREEN Collection Time: 11/12/20 10:07 AM  
Result Value Ref Range Crossmatch Expiration 11/15/2020,2359 ABO/Rh(D) O POSITIVE Antibody screen NEG Unit number [id number] Blood component type  LR,1 Unit division 00 Status of unit ISSUED Crossmatch result Compatible Unit number [id number] Blood component type  LR,1 Unit division 00 Status of unit ALLOCATED Crossmatch result Compatible RBC, ALLOCATE Collection Time: 11/12/20 10:30 AM  
Result Value Ref Range HISTORY CHECKED? Historical check performed Medications Administered 0.9% sodium chloride infusion Admin Date 11/12/2020 Action New Bag Dose 25 mL/hr Rate 25 mL/hr Route IntraVENous Administered By 
Merrick Howe RN  
  
  
 0.9% sodium chloride injection 10 mL Admin Date 11/12/2020 Action Given Dose 
10 mL Route IntraVENous Administered By 
Merrick Howe RN  
  
  
 dexamethasone (DECADRON) 12 mg in 0.9% sodium chloride 50 mL, overfill volume 5 mL IVPB Admin Date 11/12/2020 Action Given Dose 
12 mg Rate 232 mL/hr Route IntraVENous Administered By 
Merrick Howe RN  
  
  
 diphenhydrAMINE (BENADRYL) injection 50 mg   
 Admin Date 11/12/2020 Action Given Dose 50 mg Route IntraVENous Administered By 
Merrick Howe, NIKIA  
  
  
 famotidine (PF) (PEPCID) 20 mg in 0.9% sodium chloride 10 mL injection Admin Date 11/12/2020 Action Given Dose 
20 mg Route IntraVENous Administered By 
Merrick Howe RN  
  
  
 heparin (porcine) pf 300-500 Units Admin Date 11/12/2020 Action Given Dose 
500 Units Route InterCATHeter Administered By 
Merrick Howe RN  
  
  
 PACLitaxeL (TAXOL) 197 mg in 0.9% sodium chloride 250 mL, overfill volume 25 mL chemo infusion Admin Date 11/12/2020 Action New Bag Dose 
197 mg Rate 
307.8 mL/hr Route IntraVENous Administered By 
Merrick Howe RN  
  
  
 palonosetron HCl (ALOXI) injection 0.25 mg   
 Admin Date 11/12/2020 Action Given Dose 0.25 mg Route IntraVENous Administered By 
Merrick Howe RN  
  
  
 saline peripheral flush soln 10 mL Admin Date 11/12/2020 Action Given Dose 
10 mL Route InterCATHeter Administered By 
Merrick Howe, [address] Patient tolerated treatment well. Port maintained positive blood return throughout treatment. Port flushed, heparinized and de accessed per protocol. Patient was discharged from [facility] Future Appointments Date Time Provider Gabriel Marjan 11/13/2020  8:00 AM A1 PEDS MED [address]   
11/19/2020  8:00 AM A1 [facility] [address] Nohemi Quinones RN November 12, 2020 Pt arrived at [facility] and in no distress for transfusion of 1 units PRBCs. Assessment completed, no new complaints voiced. Port established without difficulty. Signs/symptoms of adverse blood reaction discussed with pt, voiced understanding. Medications received: 
[facility] See 1st unit PRBCs started and infusing without difficulty, observed x 15 minutes 1st unit completed without adverse reaction noted, NS flushing line. 1700 Tolerated transfusion  well, no adverse reaction noted. D/C instructions reviewed, copy to pt, voiced understanding. Patient declined 1 hour post transfusion observation. D/Cd from [facility]  and in no distress accompanied by self. Next appt Future Appointments Date Time Provider Gabriel Phillip 11/13/2020  8:00 AM A1 PEDS MED [address]   
11/19/2020  8:00 AM A1 [facility] [address] [facility]

## 2020-11-13 NOTE — PROGRESS NOTES
Outpatient Infusion Center Short Visit Progress Note [id number] Patient admitted to [facility] for labs/possible transfusion ambulatory in stable condition. Assessment completed. No new concerns voiced. Vital Signs: 
Visit Vitals BP (!) 146/84 Pulse 82 Temp 98 °F (36.7 °C) Resp 16 LMP 09/07/2011 SpO2 98% Breastfeeding No  
 
Labs drawn peripherally from right [facility] without difficulty, x1 stick. Per PA, redraw CBC before transfusion to re-check Hgb level. Lab Results: 
Recent Results (from the past 12 hour(s)) RBC, ALLOCATE Collection Time: 11/13/20  7:55 AM  
Result Value Ref Range HISTORY CHECKED? Historical check performed CBC WITH AUTOMATED DIFF Collection Time: 11/13/20  8:22 AM  
Result Value Ref Range WBC 5.8 3.6 - 11.0 K/uL  
 RBC 2.70 (L) 3.80 - 5.20 M/uL HGB 7.7 (L) 11.5 - 16.0 g/dL HCT 24.9 (L) 35.0 - 47.0 % MCV 92.2 80.0 - 99.0 FL  
 MCH 28.5 26.0 - 34.0 PG  
 MCHC 30.9 30.0 - 36.5 g/dL  
 RDW 18.7 (H) 11.5 - 14.5 % PLATELET 665 855 - 796 K/uL MPV 9.5 8.9 - 12.9 FL  
 NRBC 1.7 (H) 0  WBC ABSOLUTE NRBC 0.10 (H) 0.00 - 0.01 K/uL NEUTROPHILS PENDING % LYMPHOCYTES PENDING % MONOCYTES PENDING % EOSINOPHILS PENDING % BASOPHILS PENDING % IMMATURE GRANULOCYTES PENDING %  
 ABS. NEUTROPHILS PENDING K/UL  
 ABS. LYMPHOCYTES PENDING K/UL  
 ABS. MONOCYTES PENDING K/UL  
 ABS. EOSINOPHILS PENDING K/UL  
 ABS. BASOPHILS PENDING K/UL  
 ABS. IMM. GRANS. PENDING K/UL  
 DF PENDING Notified MALLIKA Pond of Hgb of 7.7, and stated patient does not need 2nd blood transfusion today. 0900 Patient discharged from [place] [address] ambulatory in no distress at 0900. Patient aware of next appointment. Future Appointments Date Time Provider Gabriel Phillip 11/19/2020  8:00 AM [address]

## 2020-11-19 NOTE — PROGRESS NOTES
Outpatient Infusion Center - Chemotherapy Progress Note    0800 Pt admit to [facility] for Taxol ambulatory in stable condition. Assessment completed. No new concerns voiced. Port accessed with positive blood return. Labs drawn and sent for processing.  IV attached to NS at [address] 11/19/2020   Cycle C4 D15   Date 11/19/2020   Drug / Regimen Taxol   Dosage -   Pre Hydration -   Pre Meds given   Notes given       Visit Vitals  BP (!) (P) 170/92   Pulse (P) 87   Temp 96.9 °F (36.1 °C)   Resp 18   Wt 118.4 kg (261 lb)   LMP 09/07/2011   BMI 39.68 kg/m²       Medications:  Medications Administered     0.9% sodium chloride infusion     Admin Date  11/19/2020 Action  New Bag Dose  25 mL/hr Rate  25 mL/hr Route  IntraVENous Administered By  Juwan Nielsen RN          0.9% sodium chloride injection 10 mL     Admin Date  11/19/2020 Action  Given Dose  10 mL Route  IntraVENous Administered By  Juwan Nielsen RN          dexamethasone (DECADRON) 12 mg in 0.9% sodium chloride 50 mL, overfill volume 5 mL IVPB     Admin Date  11/19/2020 Action  Given Dose  12 mg Rate  232 mL/hr Route  IntraVENous Administered By  Juwan Nielsen RN          diphenhydrAMINE (BENADRYL) injection 50 mg     Admin Date  11/19/2020 Action  Given Dose  50 mg Route  IntraVENous Administered By  Juwan Nielsen RN          famotidine (PF) (PEPCID) 20 mg in 0.9% sodium chloride 10 mL injection     Admin Date  11/19/2020 Action  Given Dose  20 mg Route  IntraVENous Administered By  Juwan Nielsen RN          heparin (porcine) pf 300-500 Units     Admin Date  11/19/2020 Action  Given Dose  500 Units Route  InterCATHeter Administered By  Juwan Nielsen RN          PACLitaxeL (TAXOL) 197 mg in 0.9% sodium chloride 250 mL, overfill volume 25 mL chemo infusion     Admin Date  11/19/2020 Action  New Bag Dose  197 mg Rate  307.8 mL/hr Route  IntraVENous Administered By  Juwan Nielsen RN          palonosetron HCl (ALOXI) injection 0.25 mg Admin Date  11/19/2020 Action  Given Dose  0.25 mg Route  IntraVENous Administered By  Juwan Nielsen RN          saline peripheral flush soln 10 mL     Admin Date  11/19/2020 Action  Given Dose  10 mL Route  InterCATHeter Administered By  Juwan Nielsen RN           Admin Date  11/19/2020 Action  Given Dose  10 mL Route  InterCATHeter Administered By  Juwan iNelsen RN                      1200 Pt tolerated treatment well. Port maintained positive blood return throughout treatment, flushed with positive blood return at conclusion. D/c home ambulatory in no distress. Pt aware of next appointment scheduled for 12/3/20    Recent Results (from the past 24 hour(s))   CBC WITH AUTOMATED DIFF    Collection Time: 11/19/20  8:22 AM   Result Value Ref Range    WBC 4.2 3.6 - 11.0 K/uL    RBC 2.41 (L) 3.80 - 5.20 M/uL    HGB 7.0 (L) 11.5 - 16.0 g/dL    HCT 23.1 (L) 35.0 - 47.0 %    MCV 95.9 80.0 - 99.0 FL    MCH 29.0 26.0 - 34.0 PG    MCHC 30.3 30.0 - 36.5 g/dL    RDW 18.8 (H) 11.5 - 14.5 %    PLATELET 682 627 - 820 K/uL    MPV 9.6 8.9 - 12.9 FL    NRBC 0.5 (H) 0  WBC    ABSOLUTE NRBC 0.02 (H) 0.00 - 0.01 K/uL    NEUTROPHILS 63 32 - 75 %    LYMPHOCYTES 23 12 - 49 %    MONOCYTES 9 5 - 13 %    EOSINOPHILS 3 0 - 7 %    BASOPHILS 1 0 - 1 %    IMMATURE GRANULOCYTES 1 (H) 0.0 - 0.5 %    ABS. NEUTROPHILS 2.6 1.8 - 8.0 K/UL    ABS. LYMPHOCYTES 1.0 0.8 - 3.5 K/UL    ABS. MONOCYTES 0.4 0.0 - 1.0 K/UL    ABS. EOSINOPHILS 0.1 0.0 - 0.4 K/UL    ABS. BASOPHILS 0.0 0.0 - 0.1 K/UL    ABS. IMM.  GRANS. 0.1 (H) 0.00 - 0.04 K/UL    DF AUTOMATED     METABOLIC PANEL, BASIC    Collection Time: 11/19/20  8:22 AM   Result Value Ref Range    Sodium 141 136 - 145 mmol/L    Potassium 4.0 3.5 - 5.1 mmol/L    Chloride 115 (H) 97 - 108 mmol/L    CO2 23 21 - 32 mmol/L    Anion gap 3 (L) 5 - 15 mmol/L    Glucose 174 (H) 65 - 100 mg/dL    BUN 35 (H) 6 - 20 MG/DL    Creatinine 1.28 (H) 0.55 - 1.02 MG/DL    BUN/Creatinine ratio 27 (H) 12 - 20      GFR est AA 52 (L) >60 ml/min/1.73m2    GFR est non-AA 43 (L) >60 ml/min/1.73m2    Calcium 8.7 8.5 - 10.1 MG/DL

## 2020-11-25 NOTE — PROGRESS NOTES
27 [facility], [address] [place] 
P [phone]  F [phone] Office Visit Patient ID: 
Name: Harvey Moise MRN: [id number] : 1960/60 y.o. Visit date: 2020 LEONID White is a 62 y.o.  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 The patient's previous treatment procedures include primary Taxol/carbo with retreatment in  and Doxil/Avastin, topotecan, gemzar and now single agent weekly Taxol initiated 18 following progression with chest wall involvement. In 2018, patient did not follow up and self-discontinued her treatment regimen. She presented to the ER on 18 with SOB. Negative workup for PE or MI. Admitted to Hospitalist service from -18. Managed for acute asthma exacerbation, acute FELICIA, and hyperkalemia. The patient was initiated on Hira Saunas on 19. Underwent repeat CT C/A/P on 19 for purposes of following response to treatment. Patient was tolerating Hira Saunas well for the first month, but then had a rise in her creatinine. However, she was also found to have hydronephrosis. Underwent right ureteral stent placement on 3/18/19. Restarted Zejula afterwards, although with continued rise in creatinine to >2. Held Hira Saunas again and restarted Zejula at 100mg/daily on 19. Presents today for follow-up and consideration of continuation of Hira Saunas. Completed 2700 cGy of radiation to her chest wall mass on 19. Tolerating treatment well. Held for one week in 10/2019 for blood transfusion secondary to anemia. Normal iron and B12 labs. Her Ca-125 has bounced around, but has been steadily declining recently. CT C/A/P on 10/2/19 with improvement in her disease. CT C/A/P 2020 demonstrates progression. Rising Ca-125. On 2020 initiated on weekly paclitaxel. Presents today for hospital follow-up after recent admission last week to [facility] with right lower extremity DVT. She reports undergoing manometry testing for neuropathy of her right leg, and then she reports swelling in the entire leg. She went to the ER at that time and was diagnosed with a right LE DVT. She was initiated on Lovenox and transitioned to Eliquis. She is scheduled to start cycle 5 of treatment next week. She missed the last weekly dose of cycle 3. She has tolerated treatment well thus far, and denies complaints except for pain and swelling in her right leg. She denies pain in her chest wall or abdomen. She does have some constipation on the day of treatment, which is relieved with Miralax. Ureteral stent exchanged at the end of October. She has not required any antibiotics recently. Denies change in appetite or bowel habits. Denies vaginal bleeding, hematuria, hematochezia, constipation, diarrhea, nausea, vomiting, CP, SOB, fevers, or chills. 
   
OncTx History: 
9/21/12: MARAH/BSO/Cytoreductive surgery on 9/21/12 noted carcinomatosis/omental caking. 10/2012-2/2013: 6 cycles Carbo/Taxol 8/2014-11/2014: 6 Cycles carbo/Taxol 3/2015-5/13/15: 3 cycles Avastin/Doxil until progression 7/2015-8/2015 Weekly Topotecan  
12/2015-3/2016: Weekly Topotecan 2/2017-9/2017Cristobal Fling D1/D8 Q28 days for 6 cycles 4/2018: CT core needle bx chest wall mass: Metastatic high-grade serous carcinoma (see comment) General Categorization Positive for malignancy. 5/8/2018-Present: Taxol  Z7,5,37; S/P  Cycle #4  8/14/2018. Patient discontinued treatment secondary to side effects and fatigue. 1/23/2019 - Initiated Maureen Shah. Held do to rising creatinine. However, she was also found to have hydronephrosis. Underwent right ureteral stent placement on 3/18/19. CT C/A/P 6/24/2020 with mixed response, but overall progression. Rising Ca-125 now 268. 8/6/2020: Initiated weekly paclitaxel 80mg/m2 days , 8, and 15 of 28-day cycle. Imaging Review:  
CT C/A/P 6/24/2020: 
FINDINGS:  
THYROID: No nodule. MEDIASTINUM: Left paratracheal adenopathy now measures 3.7 x 2.0 cm increased in 
size. Mass in AP window which extends superiorly into the left infraclavicular 
region and inferiorly into the left hilum is also increase in size. When 
measured in a similar fashion this measures 3.5 x 2.4 previously 3.0 x 2.9 cm. Right parasternal mass is decreased in size measuring 3.4 x 3.4 cm. Subcarinal 
adenopathy measures 2.8 x 2.3 cm increased in size. There is increased size of 
the nodular component adjacent to the xiphoid now measuring 2.6 x 3.3 cm. SWATI: Left hilar lymph node measures 3.2 x 2.4 cm previously 2.5 x 1.8 cm. THORACIC AORTA: No dissection or aneurysm. MAIN PULMONARY ARTERY: Normal in caliber. TRACHEA/BRONCHI: Patent. ESOPHAGUS: No wall thickening or dilatation. HEART: Normal in size. PLEURA: No effusion or pneumothorax. LUNGS: No nodule, mass, or airspace disease. LIVER: Multiple masses are noted and better visualized on current study 
secondary to the addition of intravenous contrast. Comparison lesions measure 
previously, there is a 3.5 x 2.8 cm mass in the right hepatic lobe series 3 
image 58 slightly increased in size. There is a 3.6 x 2.9 cm mass in the left 
hepatic lobe slightly decreased in size series 3 image 56. Left hepatic lobe 
mass series 3 image 45 measuring 4.0 x 2.8 cm slightly increased in size. There 
is extensive tumor infiltration in the liver hilum with narrowing of the main 
portal vein as well as the intrahepatic portal veins. The common hepatic artery 
is not well visualized and likely occluded with some collateral flow noted in 
the liver hilum. GALLBLADDER: Not visualized SPLEEN: Mass in the splenic hilum increased in size measuring 7.4 x 4.3 cm with 
invasion into the splenic hilum. PANCREAS: The pancreas is encased by tumor. ADRENALS: There is likely involvement of the left adrenal gland tumor. KIDNEYS: Right nephroureteral stent with mild to moderate right-sided 
hydronephrosis. STOMACH: Tumor encasing the gastric antrum. SMALL BOWEL: No dilatation or wall thickening. COLON: Tumor abutting the tumor encasing the rectosigmoid junction. Tumor 
abutting the transverse colon. APPENDIX: Unremarkable. PERITONEUM: Some free fluid noted in the pelvis. Increased size of mesenteric 
masses. Representative examples: 7.8 x 4.3 cm tumor anterior peritoneum series 3 
image 84. No pelvic tumor 7.5 x 4.8 cm series 3 image 100. RETROPERITONEUM:  
Right pelvic sidewall increased in size. Large right lateral lymph node 
measuring 3.1 x 2.0 cm not significantly changed. REPRODUCTIVE ORGANS: The visualized URINARY BLADDER: No mass or calculus. BONES: No destructive bone lesion. ADDITIONAL COMMENTS: N/A IMPRESSION IMPRESSION: 
1. There is some variable response to therapy with several lesions decreased in 
size. However a majority of the lesions have increased in size consistent with 
progression of disease with involvement of the mediastinum, liver masses, and 
peritoneal carcinomatosis. 2.  Increase right-sided hydronephrosis. Tamez Quince CT C/A/P 10/2/19:  
FINDINGS: 
THYROID: Visualized gland is unremarkable. MEDIASTINUM: Upper left paratracheal node measures 2.2 x 3.4 cm (2, 8), 
previously 2.3 x 3.5 cm. Superior prevascular node measures 2.9 x 3.0 cm, 
previously 3.5 x 3.8 cm. Poorly delineated subcarinal node measures about 3.4 x 
4.8 cm, previously 3.6 x 4.8 cm. Precardiac nodes measure 1.9 x 2.5 cm and 1.7 x 
2.4 cm (2, 39), previously 2.1 x 2.7 cm and 2.0 x 2.0 cm. SWATI: No mass or lymphadenopathy. THORACIC AORTA: No dissection or aneurysm. MAIN PULMONARY ARTERY: Normal in caliber. TRACHEA/BRONCHI: Patent. ESOPHAGUS: No wall thickening or dilatation. HEART: Normal in size. PLEURA: No effusion or pneumothorax. LUNGS: No nodule, mass, or airspace disease. LIVER: Multiple hypodense lesions suspicious for metastases, measuring up to 2.5 
x 4.0 cm in segment 2 (2, 43) series, previously 2.8 x 3.7 cm, 3.2 x 4.6 cm in 
segment 3 (2, 55), previously 3.1 x 4.5 cm, and partially exophytic from the 
posterior inferior right lobe segment 6 measuring 2.5 x 3.5 cm, previously 2.8 x 
3.4 cm. GALLBLADDER: Unremarkable. SPLEEN: Central 1.9 x 3.1 cm hypodensity (2, 47), previously 1.9 x 2.3 cm. Otherwise unremarkable. PANCREAS: Inseparable from bulky central abdominal adenopathy with no identified 
ductal dilation. ADRENALS: Unremarkable. KIDNEYS: Right hydronephrosis has improved status post right nephroureteral 
stent placement. Kidneys otherwise appear unremarkable. STOMACH: Unremarkable. SMALL BOWEL: No dilatation or wall thickening. COLON: No dilation or wall thickening. APPENDIX: Unremarkable. PERITONEUM: Extensive peritoneal implants with representative largest lesions 
measuring 4.4 x 6.1 cm and the left adnexal region (2, 98), previously 4.3 x 5.7 
cm, 3.8 x 5.9 cm in the anterior midline (2, 81), previously 3.4 x 5.4 cm, and 
2.8 x 4.7 cm in the right abdomen (2, 78), previously 3.2 x 4.7 cm. RETROPERITONEUM: Extensive rocio hepatis, retroperitoneal, and pelvic sidewall 
adenopathy. Conglomerate nkechi mass in the rocio hepatis region measures 5.7 x 
11.1 cm (2, 58) is, previously 6.3 x 12.2 cm. Aortocaval node measures 3.7 x 5.3 
cm (2, 77), previously 4.5 x 5.4 cm. Right pelvic sidewall node measures 2.9 x 
4.2 cm (2, 103), previously 3.4 x 4.2 cm. Left external iliac node measures 3.0 
x 4.8 cm (2, 101), previously 3.3 x 4.8 cm. REPRODUCTIVE ORGANS: Uterus and ovaries are surgically absent. URINARY BLADDER: No mass or calculus. Distal end of right nephroureteral stent 
positioned within bladder lumen. BONES: Degenerative spine change. No acute fracture or aggressive lesion. ADDITIONAL COMMENTS: Mass centered at the right sternocostal junction measures 5.3 x 5.4 cm (2, 21), previously 4.8 x 5.7 cm. Post surgical changes in the 
anterior abdominal wall. Left Port-A-Cath in place. Bilateral inguinal 
adenopathy measuring 2.1 x 3.2 cm on the right (2, 115), previously 2.3 x 3.7 cm 
and 2.7 x 2.8 cm on the left (2, 108), previously 2.9 x 3.1 cm. Zoran Steve IMPRESSION IMPRESSION: Overall response to disease with either decrease in size or no 
significant change in extensive nkechi metastases, hepatic metastases, right 
sternal metastasis, and peritoneal carcinomatosis as above. No new metastasis or 
progressive metastasis identified. Status post right nephroureteral stent with 
interval resolution of right hydronephrosis. CT C/A/P 2/5/19: 
FINDINGS:  
THYROID: No nodule. MEDIASTINUM: Mediastinal lymphadenopathy is unchanged. A reference superior 
mediastinal lymph node is stable measuring 3.3 cm. SWATI: No mass or lymphadenopathy. THORACIC AORTA: No dissection or aneurysm. MAIN PULMONARY ARTERY: Normal in caliber. TRACHEA/BRONCHI: Patent. ESOPHAGUS: No wall thickening or dilatation. HEART: Normal in size. PLEURA: No effusion or pneumothorax. LUNGS: No nodule, mass, or airspace disease. LIVER: Evaluation of the liver is limited by lack of intravenous contrast. There 
is a vague 4.4 cm hypodense lesion in the left hepatic lobe previously measuring 3.0 cm. Lesion in the right hepatic lobe now measures 3.3 cm, previously 
measuring 2.7 cm. GALLBLADDER: Unremarkable. SPLEEN: Central soft tissue abnormality has increased in size, now measuring 4.9 
cm, previously measuring 3.1 cm. PANCREAS: Not well evaluated given the massive lymphadenopathy and lack of 
intravenous contrast. 
ADRENALS: Unremarkable. KIDNEYS: There is no moderate right hydronephrosis. STOMACH: Unremarkable. SMALL BOWEL: No dilatation or wall thickening. COLON: No dilatation or wall thickening. APPENDIX: Not visualized. PERITONEUM: Bulky gastrohepatic, periceliac, portacaval, and mesenteric 
lymphadenopathy has increased. There is secondary invasion of the liver. Maximum 
dimension in the upper abdomen is currently 10.4 cm, previously 9.3 cm. Elemental soft tissue nodules have increased in size, with a reference 7.0 cm 
nodule in the left abdomen previously measuring 5.5 cm. Soft tissue nodule in 
the deep pelvis between the bladder and colon has increased in size as well. RETROPERITONEUM: No lymphadenopathy or aortic aneurysm. REPRODUCTIVE ORGANS: The uterus and ovaries are surgically absent. URINARY BLADDER: No mass or calculus. BONES: Soft tissue mass involving the right sternum has not changed. Degenerative changes are seen in the thoracic and lumbar spine. ADDITIONAL COMMENTS: N/A IMPRESSION IMPRESSION: 
Stable mediastinal lymphadenopathy. Progressive abdominal lymphadenopathy as 
described above. Liver lesions have increased in size compared to the prior 
exam. Stable right sternal mass. Moderate right hydroureteronephrosis is now 
noted which appears to be related to the lymphadenopathy. ROS Constitutional: no weight loss, fever, night sweats Respiratory: no cough, shortness of breath, or wheezing Cardiovascular: no chest pain or dyspnea on exertion. Reports chest wall mass smaller, no associated pain. Heme: No abnormal bleeding Gastrointestinal: no abdominal pain, change in bowel habits, or black or bloody stools Genito-Urinary: no dysuria, trouble voiding, or hematuria Musculoskeletal: + swelling in ankle - bilateral, mostly only at end of day Neurological: mild neuropathy Derm: pigmentation/induration medial left leg. Prior injury from fall. Psych: positive for depression - controlled PMH: 
Past Medical History:  
Diagnosis Date  Adverse effect of anesthesia sleep apnea cpap machine useage  Anemia  Arthritis DDD low back and knees  Asthma   
 uses albuterol prn only; none x 6 mos. Never hospitalized  BRCA negative 2013  Calculus of kidney  Chronic kidney disease   
 kidney stones  Chronic pain   
 knee pain bilat  CINV (chemotherapy-induced nausea and vomiting) 2014  Decreased hearing, right PATIENT STATES THIS IS DUE TO CHEMOTHERAPY  Diabetes ([address] Age 46 IDDM  Environmental allergies  Fibromyalgia  GERD (gastroesophageal reflux disease)   
 controlled with med  Hx of pulmonary embolus during pregnancy 2015  Hydronephrosis due to obstruction of ureter 3/18/2019  Hypertension  Ill-defined condition Sepsis  -  SOURCE PORT  
 Ill-defined condition 2016  
 chemotherapy  Mass of chest wall 2018  Morbid obesity ([address]  Murmur, heart  Other and unspecified hyperlipidemia  Ovarian cancer ([address] 2012, 2014  
 serous, high grade, stage IIIc. s/p chemotx (has port)  Psychiatric disorder Depression/Anxiety  Rectal bleeding  Thromboembolus ([address]  POST PARTUM; resolved- PELVIS  Thyroid disease HYPOTHYROID  Unspecified sleep apnea CPAP, Dr. Saundra Clarke PSH: 
Past Surgical History:  
Procedure Laterality Date  BREAST SURGERY PROCEDURE UNLISTED Lymph node in left breast 2017 [place]  
  X2  
 HX HYSTERECTOMY  2012 ROULA/BSO, tumor debulking, omentectomy for ovarian cancer  HX ORTHOPAEDIC Right   
 ankle-FX, R  
 HX ORTHOPAEDIC Right   
 FX R ARM  
 HX UROLOGICAL Right  STENT FOR KIDNEY STONES  
 HX VASCULAR ACCESS  2015  
 right chest- port placed  HX VASCULAR ACCESS Left 2017 [facility] CATH  
 
SOC: 
Social History Socioeconomic History  Marital status:  Spouse name: Not on file  Number of children: Not on file  Years of education: Not on file  Highest education level: Not on file Occupational History  Not on file Social Needs  Financial resource strain: Not on file  Food insecurity Worry: Not on file Inability: Not on file  Transportation needs Medical: Not on file Non-medical: Not on file Tobacco Use  Smoking status: Never Smoker  Smokeless tobacco: Never Used Substance and Sexual Activity  Alcohol use: Yes Comment: 1 drink per month  Drug use: No  
 Sexual activity: Yes Lifestyle  Physical activity Days per week: Not on file Minutes per session: Not on file  Stress: Not on file Relationships  Social connections Talks on phone: Not on file Gets together: Not on file Attends Shinto service: Not on file Active member of club or organization: Not on file Attends meetings of clubs or organizations: Not on file Relationship status: Not on file  Intimate partner violence Fear of current or ex partner: Not on file Emotionally abused: Not on file Physically abused: Not on file Forced sexual activity: Not on file Other Topics Concern  Not on file Social History Narrative Lives in [facility] alone.  passed away in 5/16 of a heart attack. Has 2 sons. Disability. 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 Enjoys swimming and going to the gym. Family History Family History Problem Relation Age of Onset  Hypertension Mother Jose.Elder Arthritis-osteo Mother  Hypertension Father  Arthritis-osteo Father  Cancer Father PROSTATE  COPD Father  Hypertension Brother  Elevated Lipids Brother  Arthritis-osteo Brother  Hypertension Brother  Elevated Lipids Brother  Obesity Brother  Cancer Brother PROSTATE  Anesth Problems Son DELAYED AWAKENING  
 Diabetes Maternal Grandmother  Anesth Problems Paternal Grandmother PATIENT STATES GRANDMOTHER'S HEART STOPPED DURING SURGERY Medications: (reviewed) Current Outpatient Medications on File Prior to Visit Medication Sig Dispense Refill  omega-3 acid ethyl esters (LOVAZA) 1 gram capsule TAKE 1 CAPSULE BY MOUTH TWICE A  Cap 2  
 traMADoL (ULTRAM) 50 mg tablet Take 50 mg by mouth every six (6) hours as needed for Pain.  apixaban (Eliquis) 5 mg tablet Take 5 mg by mouth two (2) times a day.  rosuvastatin (CRESTOR) 10 mg tablet Take 10 mg by mouth nightly.  doxazosin (CARDURA) 1 mg tablet Take  by mouth daily.  doxazosin (CARDURA) 1 mg tablet Take 1 mg by mouth daily.  fluticasone propionate (FLONASE) 50 mcg/actuation nasal spray 2 SPRAYS IN BOTH NOSTRILS DAILY 1 Bottle 0  
 cycloSPORINE (RESTASIS) 0.05 % dpet  albuterol (PROVENTIL VENTOLIN) 2.5 mg /3 mL (0.083 %) nebu Take 2.5 mg by inhalation.  dextroamphetamine-amphetamine (ADDERALL) 20 mg tablet TAKE 1 TABLET BY MOUTH EVERY DAY  0  
 rosuvastatin (CRESTOR) 10 mg tablet TAKE 1 TABLET BY MOUTH EVERY DAY  0  
 atorvastatin (LIPITOR) 20 mg tablet  insulin aspart U-100 (NOVOLOG FLEXPEN U-100 INSULIN) 100 unit/mL (3 mL) inpn INJECT 20 UNITS BEFORE EACH MEAL + 4 UNITS FOR EVERY 50 MG/DL ABOVE 150 MG/DL.  UNITS PER DAY 90 Adjustable Dose Pre-filled Pen Syringe 3  
 LEVEMIR FLEXTOUCH U-100 INSULN 100 unit/mL (3 mL) inpn INJECT 30 UNITS IN AM AND 50 UNITS AT NIGHT. INCREASE AS DIRECTED TO  UNITS/DAY. 90 Adjustable Dose Pre-filled Pen Syringe 3  DEXILANT 60 mg CpDB capsule (delayed release) TAKE 1 CAPSULE BY MOUTH EVERY DAY 90 Cap 3  
 LINZESS 145 mcg cap capsule TAKE 1 CAPSULE BY MOUTH EVERY DAYY 30 Cap 0  
 levothyroxine (SYNTHROID) 150 mcg tablet TAKE 1 TABLET BY MOUTH EVERY DAY BEFORE BREAKFAST 90 Tab 0  
 EPINEPHrine (EPIPEN) 0.3 mg/0.3 mL injection INJECT 0.3 ML BY INTRAMUSCULAR ROUTE ONCE AS NEEDED FOR UP TO 1 DOSE.  1  
  azelastine (ASTELIN) 137 mcg (0.1 %) nasal spray 1 [place] by Both Nostrils route daily as needed. Use in each nostril as directed  insulin detemir U-100 (LEVEMIR) 100 unit/mL injection 20 Units by SubCUTAneous route daily (with lunch).  montelukast (SINGULAIR) 10 mg tablet Take 10 mg by mouth nightly.  ascorbic acid, vitamin C, (VITAMIN C) 1,000 mg tablet Take 1,000 mg by mouth daily.  albuterol (PROVENTIL VENTOLIN) 2.5 mg /3 mL (0.083 %) nebulizer solution 2.5 mg by Nebulization route every four (4) hours as needed for Wheezing.  Liraglutide (VICTOZA) 0.6 mg/0.1 mL (18 mg/3 mL) pnij 1.8 mg by SubCUTAneous route daily (with lunch). 3 Pen 3  
 SYMBICORT 160-4.5 mcg/actuation HFAA Take 2 Puffs by inhalation two (2) times daily as needed. 2 Inhaler 3  clonazePAM (KLONOPIN) 0.5 mg tablet Take 0.5 mg by mouth nightly as needed.  sertraline (ZOLOFT) 50 mg tablet TAKE 1 TABLET BY MOUTH as needed  1  cholecalciferol, VITAMIN D3, (VITAMIN D3) 5,000 unit tab tablet Take 5,000 Units by mouth daily.  furosemide (LASIX) 40 mg tablet Take 40 mg by mouth every other day. 3  
 SULFAMETHOXAZOLE-TRIMETHOPRIM PO Take  by mouth two (2) times a day. 7 days  dexAMETHasone (DECADRON) 4 mg tablet Take 2 tablets with breakfast the day before chemo and for 2 days after chemo 36 Tab 2  
 lidocaine-prilocaine (EMLA) topical cream Apply small amount over port area and cover with band aid one hour before chemo 30 g 3  
 ondansetron (ZOFRAN ODT) 4 mg disintegrating tablet Take 1 Tab by mouth every eight (8) hours as needed for Nausea. Indications: nausea and vomiting caused by cancer drugs 30 Tab 2  predniSONE (DELTASONE) 50 mg tablet Take 1 tablet 13 hours prior to scan, take 1 tablet 7 hours prior to scan and take 1 tablet one hour prior to scan 3 Tab 0  Zejula 100 mg cap Take 100 mg by mouth daily.  60 Cap 4  
  telmisartan (MICARDIS) 20 mg tablet TAKE 1 TABLET BY MOUTH EVERY DAY 90 Tab 3  BD ULTRA-FINE SHORT PEN NEEDLE 31 gauge x 5/16\" ndle USE TO INJECT INSULIN 5 TIMES DAILY 200 Pen Needle 11  
 glucose blood VI test strips (TRUE METRIX GLUCOSE TEST STRIP) strip by Does Not Apply route See Admin Instructions. 30 Strip 5  
 lancets misc by Does Not Apply route daily. True Metrix lancets- test blood sugar once per day 30 Each 5  Blood-Glucose Meter monitoring kit Check up to tid, as directed 1 Kit 0 No current facility-administered medications on file prior to visit. Allergies: (reviewed) Allergies Allergen Reactions  Carboplatin Other (comments) Patient developed shortness of breath, felt faint, coughing, skin flushed and \"itchy\". This occurred on the patients 8th treatment.  Iodinated Contrast Media Rash 13 hr pre-medication prior to IV contrast.  
Patient has done well with 1 hr pre-medication of (Solu-Medrol) 40mg &  (Benadryl) 50mg.  Lipitor [Atorvastatin] Myalgia  Shellfish Containing Products Hives  Tape [Adhesive] Itching and Other (comments) \"op site-- clear,thin tape\"--caused blister  Tomato Hives  Iodine And Iodide Containing Products Rash  Olmesartan Other (comments)  Losartan Other (comments)  
  headaches  Percocet [Oxycodone-Acetaminophen] Other (comments) Fever; however, current home med OBJECTIVE Physical Exam 
Visit Vitals BP (!) 158/92 (BP 1 Location: Left arm, BP Patient Position: Sitting) Pulse 91 Ht 5' 8\" (1.727 m) Wt 264 lb (119.7 kg) LMP 09/07/2011 BMI 40.14 kg/m² Physical Exam: 
General: Alert and oriented. No acute distress. Well-nourished HEENT: No thyroid enlargment. Neck supple without restrictions. Sclera normal. Normal occular motion. Moist mucous membranes. Lymphatics: No evidence of axillary, cervical, or subclavicular adenopathy. Respiratory: clear to auscultation and percussion to the bases. No CVAT. Chest wall mass is still palpable along the superior sternum at the level of the superior aspect of the breasts, but improved. Normal overlying skin Cardiovascular: regular rate and rhythm. No murmurs, rubs, or gallops. Gastrointestinal: soft, non-tender, non-distended, no masses or organomegaly. Well-healed incision. Musculoskeletal: normal gait. No joint tenderness, deformity or swelling. No muscular tenderness. Extremities: extremities normal, atraumatic, mild 1+ edema bilaterally. Pelvic: deferred today Neuro: Grossly intact. Normal gait and movement. No acute deficit Skin: No evidence of rashes or skin changes. DATE REVIEW as available: 
Lab Results Component Value Date/Time WBC 4.2 11/19/2020 08:22 AM  
 Hemoglobin (POC) 10.5 (L) 03/20/2017 12:15 PM  
 HGB 7.0 (L) 11/19/2020 08:22 AM  
 Hematocrit (POC) 31 (L) 03/20/2017 12:15 PM  
 HCT 23.1 (L) 11/19/2020 08:22 AM  
 PLATELET 690 75/95/5416 08:22 AM  
 MCV 95.9 11/19/2020 08:22 AM  
 
Lab Results Component Value Date/Time  
 ABS. NEUTROPHILS 2.6 11/19/2020 08:22 AM  
 
Lab Results Component Value Date/Time Sodium 141 11/19/2020 08:22 AM  
 Potassium 4.0 11/19/2020 08:22 AM  
 Chloride 115 (H) 11/19/2020 08:22 AM  
 CO2 23 11/19/2020 08:22 AM  
 Anion gap 3 (L) 11/19/2020 08:22 AM  
 Glucose 174 (H) 11/19/2020 08:22 AM  
 BUN 35 (H) 11/19/2020 08:22 AM  
 Creatinine 1.28 (H) 11/19/2020 08:22 AM  
 BUN/Creatinine ratio 27 (H) 11/19/2020 08:22 AM  
 GFR est AA 52 (L) 11/19/2020 08:22 AM  
 GFR est non-AA 43 (L) 11/19/2020 08:22 AM  
 Calcium 8.7 11/19/2020 08:22 AM  
 Bilirubin, total 0.5 11/05/2020 08:07 AM  
 Alk. phosphatase 65 11/05/2020 08:07 AM  
 Protein, total 7.7 11/05/2020 08:07 AM  
 Albumin 3.0 (L) 11/05/2020 08:07 AM  
 Globulin 4.7 (H) 11/05/2020 08:07 AM  
 A-G Ratio 0.6 (L) 11/05/2020 08:07 AM  
 ALT (SGPT) 17 11/05/2020 08:07 AM  
 
Ca-125: 6/11/2020: 268 
9/3/2020: 187 ECHO 7/17/18 Left ventricle: Systolic function was normal. Ejection fraction was estimated in the range of 55 % to 60 %. There were no regional wall motion 
abnormalities. Wall thickness was mildly increased. Left atrium: The atrium was mildly dilated. Mitral valve: There was mild regurgitation. Aortic valve: Leaflets exhibited sclerosis without stenosis. Not well visualized. IMPRESSION AND PLAN: 
Ms. Maricarmen Chowdary is a 62 y.o. female with recurrent, metastatic ovarian cancer. Heavily pre-treated. Lost to follow-up since 9/2018, at which time patient self-discontinued weekly paclitaxel. Initiated Vandana Amari on 1/23/19. Held after 1 month secondary to rising creatinine. Found to have right hydronephrosis. Right ureteral stent placed 3/18/19 with normalization of creatinine. Completed 2700 cGy to the sternun 7/11/19. Restarted Zejula at 100mg/daily on 4/23/19. Now with rising Ca-125 and progression on CT 6/24/2020. Initiated on weekly Taxol 80mg/m2 days 1, 8, and 15 in a 28-day cycle on 8/6/2020. Problem List Items Addressed This Visit Endocrine Ovarian cancer ([address] - Primary Type 2 diabetes mellitus with nephropathy ([address] Hypothyroidism Immune Lymphadenopathy, mediastinal  
  
 Other Morbid obesity ([address] On antineoplastic chemotherapy Pain due to malignant neoplasm metastatic to bone [facility])  
 S/P ureteral stent placement Reviewed patient's course to date. Presents today for consideration of cycle 5 weekly paclitaxel. Recent RLE DVT. Remains on Eliquis. Patient to remain on Eliquis indefinitely given her active ongoing treatment of her cancer. Tolerated cycle 1-4 well. Her Ca-125 is responding to treatment nicely. Will follow-up pre-chemo labs. Will plan for repeat imaging after cycle 6 given her Ca-125 is responding. Would plan to treat until progression, or may consider maintenance PARPi after 6 cycles with either olaparib or rucaparib. Urology is managing her stent and recurrent UTIs. Has not required antibiotics for some time. Stent exchanged on 10/30/2020 per patient. The patient has underlying diabetes and neuropathy. She has follow-up next week regarding numbness in the bottoms of her feet. I expressed my concerns that we will need to watch this closely while on weekly Taxol, as this may certainly worsen her diabetic neuropathy. All questions and concerns were addressed with the patient and she is comfortable with the plan.  
 
Paulo Moncada MD

## 2020-11-25 NOTE — PROGRESS NOTES
Pre chemo post C4D15 Taxol, pt complains of sharp pain in her chest at the post site, started 2 days ago 1. Have you been to the ER, urgent care clinic since your last visit? Hospitalized since your last visit?  no 
 
2. Have you seen or consulted any other health care providers outside of the [address] [place] [address] since your last visit? Include any pap smears or colon screening.     
no

## 2020-12-03 NOTE — PROGRESS NOTES
[facility] [address]  P [phone]  F [phone]    Office Visit    Patient ID:  Name: Maricarmen Chowdary  MRN: [id number]  : 1960/60 y.o. Visit date: 12/3/2020        LEONID Oliveira is a 62 y.o.  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 The patient's previous treatment include primary debulking and adjuvant Taxol/carbo with retreatment in  for recurrence. Subsequent lines of chemo as outlined below. She has most recently started single agent weekly Taxol on 18 following progression with chest wall involvement while on PARPi. Most recent EOD imaging in 2020 shows a variable response to her prior therapy though with mostly increased disease including liver, mediastium and abd with progressive right hydronephrosis. She is s/p stent placement.        SUBJECTIVE:  Ms. Fela Guerrier presents today for chemotherapy. She reports increased fatigue and dyspnea on exertion over the past week. No increased bleeding. No hematuria or blood in stool. No chest pain. She had a virtual visit with her Pulmonologist yesterday and he had no specific pulmonary concerns. She denies nausea/vomiting. Has maintained her appetite. Is tolerating fluids. Regular bowel movements. No abdominal pain. Numbness/tingling in hands and feet about the same. Seeing a podiatrist.      ROS  10 pt negative except as per above    PMH:  Past Medical History:   Diagnosis Date    Adverse effect of anesthesia     sleep apnea cpap machine useage     Anemia     Arthritis     DDD low back and knees    Asthma     uses albuterol prn only; none x 6 mos.   Never hospitalized    BRCA negative 2013    Calculus of kidney     Chronic kidney disease     kidney stones    Chronic pain     knee pain bilat    CINV (chemotherapy-induced nausea and vomiting) 2014    Decreased hearing, right     PATIENT STATES THIS IS DUE TO CHEMOTHERAPY    Diabetes ([facility] Age 46    IDDM    Environmental allergies     Fibromyalgia     GERD (gastroesophageal reflux disease)     controlled with med    Hx of pulmonary embolus during pregnancy 2015    Hydronephrosis due to obstruction of ureter 3/18/2019    Hypertension     Ill-defined condition     Sepsis  -  SOURCE PORT    Ill-defined condition 2016    chemotherapy     Mass of chest wall 2018    Morbid obesity (HCC)     Murmur, heart     Other and unspecified hyperlipidemia     Ovarian cancer ([facility] 2012, 2014    serous, high grade, stage IIIc. s/p chemotx (has port)    Psychiatric disorder     Depression/Anxiety    Rectal bleeding     Thromboembolus ([facility]     POST PARTUM; resolved- PELVIS    Thyroid disease     HYPOTHYROID    Unspecified sleep apnea     CPAP, Dr. Angel Burrell     PSH:  Past Surgical History:   Procedure Laterality Date    BREAST SURGERY PROCEDURE UNLISTED      Lymph node in left breast 2017   [place]     X2    HX HYSTERECTOMY  2012    ROULA/BSO, tumor debulking, omentectomy for ovarian cancer    HX ORTHOPAEDIC Right     ankle-FX, R    HX ORTHOPAEDIC Right     FX R ARM    HX UROLOGICAL Right 2017    STENT FOR KIDNEY STONES    HX VASCULAR ACCESS  2015    right chest- port placed    HX VASCULAR ACCESS Left 2017    TETE CATH     SOC:  Social History     Socioeconomic History    Marital status:      Spouse name: Not on file    Number of children: Not on file    Years of education: Not on file    Highest education level: Not on file   Occupational History    Not on file   Social Needs    Financial resource strain: Not on file    Food insecurity     Worry: Not on file     Inability: Not on file    Transportation needs     Medical: Not on file     Non-medical: Not on file   Tobacco Use    Smoking status: Never Smoker    Smokeless tobacco: Never Used Substance and Sexual Activity    Alcohol use: Yes     Comment: 1 drink per month    Drug use: No    Sexual activity: Yes   Lifestyle    Physical activity     Days per week: Not on file     Minutes per session: Not on file    Stress: Not on file   Relationships    Social connections     Talks on phone: Not on file     Gets together: Not on file     Attends Jain service: Not on file     Active member of club or organization: Not on file     Attends meetings of clubs or organizations: Not on file     Relationship status: Not on file    Intimate partner violence     Fear of current or ex partner: Not on file     Emotionally abused: Not on file     Physically abused: Not on file     Forced sexual activity: Not on file   Other Topics Concern    Not on file   Social History Narrative    Lives in [facility] alone.  passed away in 5/16 of a heart attack. Has 2 sons. Disability. 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 Enjoys swimming and going to the gym. Family History  Family History   Problem Relation Age of Onset    Hypertension Mother     Arthritis-osteo Mother     Hypertension Father     Arthritis-osteo Father     Cancer Father         PROSTATE    COPD Father     Hypertension Brother     Elevated Lipids Brother     Arthritis-osteo Brother     Hypertension Brother     Elevated Lipids Brother     Obesity Brother     Cancer Brother         PROSTATE    Anesth Problems Son         DELAYED AWAKENING    Diabetes Maternal Grandmother     Anesth Problems Paternal Grandmother         PATIENT [address], [address] STOPPED DURING SURGERY     Medications: (reviewed)  Current Outpatient Medications on File Prior to Encounter   Medication Sig Dispense Refill    omega-3 acid ethyl esters (LOVAZA) 1 gram capsule TAKE 1 CAPSULE BY MOUTH TWICE A  Cap 2    traMADoL (ULTRAM) 50 mg tablet Take 50 mg by mouth every six (6) hours as needed for Pain.       apixaban (Eliquis) 5 mg tablet Take 5 mg by mouth two (2) times a day.  rosuvastatin (CRESTOR) 10 mg tablet Take 10 mg by mouth nightly.  doxazosin (CARDURA) 1 mg tablet Take  by mouth daily.  doxazosin (CARDURA) 1 mg tablet Take 1 mg by mouth daily.  SULFAMETHOXAZOLE-TRIMETHOPRIM PO Take  by mouth two (2) times a day. 7 days      dexAMETHasone (DECADRON) 4 mg tablet Take 2 tablets with breakfast the day before chemo and for 2 days after chemo 36 Tab 2    lidocaine-prilocaine (EMLA) topical cream Apply small amount over port area and cover with band aid one hour before chemo 30 g 3    ondansetron (ZOFRAN ODT) 4 mg disintegrating tablet Take 1 Tab by mouth every eight (8) hours as needed for Nausea. Indications: nausea and vomiting caused by cancer drugs 30 Tab 2    predniSONE (DELTASONE) 50 mg tablet Take 1 tablet 13 hours prior to scan, take 1 tablet 7 hours prior to scan and take 1 tablet one hour prior to scan 3 Tab 0    Zejula 100 mg cap Take 100 mg by mouth daily. 60 Cap 4    fluticasone propionate (FLONASE) 50 mcg/actuation nasal spray 2 SPRAYS IN BOTH NOSTRILS DAILY 1 Bottle 0    cycloSPORINE (RESTASIS) 0.05 % dpet       albuterol (PROVENTIL VENTOLIN) 2.5 mg /3 mL (0.083 %) nebu Take 2.5 mg by inhalation.  dextroamphetamine-amphetamine (ADDERALL) 20 mg tablet TAKE 1 TABLET BY MOUTH EVERY DAY  0    rosuvastatin (CRESTOR) 10 mg tablet TAKE 1 TABLET BY MOUTH EVERY DAY  0    telmisartan (MICARDIS) 20 mg tablet TAKE 1 TABLET BY MOUTH EVERY DAY 90 Tab 3    atorvastatin (LIPITOR) 20 mg tablet       BD ULTRA-FINE SHORT PEN NEEDLE 31 gauge x 5/16\" ndle USE TO INJECT INSULIN 5 TIMES DAILY 200 Pen Needle 11    insulin aspart U-100 (NOVOLOG FLEXPEN U-100 INSULIN) 100 unit/mL (3 mL) inpn INJECT 20 UNITS BEFORE EACH MEAL + 4 UNITS FOR EVERY 50 MG/DL ABOVE 150 MG/DL.   UNITS PER DAY 90 Adjustable Dose Pre-filled Pen Syringe 3    LEVEMIR FLEXTOUCH U-100 INSULN 100 unit/mL (3 mL) inpn INJECT 30 UNITS IN AM AND 50 UNITS AT NIGHT. INCREASE AS DIRECTED TO  UNITS/DAY. 90 Adjustable Dose Pre-filled Pen Syringe 3    DEXILANT 60 mg CpDB capsule (delayed release) TAKE 1 CAPSULE BY MOUTH EVERY DAY 90 Cap 3    LINZESS 145 mcg cap capsule TAKE 1 CAPSULE BY MOUTH EVERY DAYY 30 Cap 0    levothyroxine (SYNTHROID) 150 mcg tablet TAKE 1 TABLET BY MOUTH EVERY DAY BEFORE BREAKFAST 90 Tab 0    EPINEPHrine (EPIPEN) 0.3 mg/0.3 mL injection INJECT 0.3 ML BY INTRAMUSCULAR ROUTE ONCE AS NEEDED FOR UP TO 1 DOSE.  1    glucose blood VI test strips (TRUE METRIX GLUCOSE TEST STRIP) strip by Does Not Apply route See Admin Instructions. 30 Strip 5    lancets misc by Does Not Apply route daily. True Metrix lancets- test blood sugar once per day 30 Each 5    Blood-Glucose Meter monitoring kit Check up to tid, as directed 1 Kit 0    azelastine (ASTELIN) 137 mcg (0.1 %) nasal spray 1 [place] by Both Nostrils route daily as needed. Use in each nostril as directed       insulin detemir U-100 (LEVEMIR) 100 unit/mL injection 20 Units by SubCUTAneous route daily (with lunch).  montelukast (SINGULAIR) 10 mg tablet Take 10 mg by mouth nightly.  ascorbic acid, vitamin C, (VITAMIN C) 1,000 mg tablet Take 1,000 mg by mouth daily.  albuterol (PROVENTIL VENTOLIN) 2.5 mg /3 mL (0.083 %) nebulizer solution 2.5 mg by Nebulization route every four (4) hours as needed for Wheezing.  Liraglutide (VICTOZA) 0.6 mg/0.1 mL (18 mg/3 mL) pnij 1.8 mg by SubCUTAneous route daily (with lunch). 3 Pen 3    SYMBICORT 160-4.5 mcg/actuation HFAA Take 2 Puffs by inhalation two (2) times daily as needed. 2 Inhaler 3    clonazePAM (KLONOPIN) 0.5 mg tablet Take 0.5 mg by mouth nightly as needed.  sertraline (ZOLOFT) 50 mg tablet TAKE 1 TABLET BY MOUTH as needed  1    cholecalciferol, VITAMIN D3, (VITAMIN D3) 5,000 unit tab tablet Take 5,000 Units by mouth daily.       furosemide (LASIX) 40 mg tablet Take 40 mg by mouth every other day. 3     No current facility-administered medications on file prior to encounter. Allergies: (reviewed)  Allergies   Allergen Reactions    Carboplatin Other (comments)     Patient developed shortness of breath, felt faint, coughing, skin flushed and \"itchy\". This occurred on the patients 8th treatment.  Iodinated Contrast Media Rash     13 hr pre-medication prior to IV contrast.   Patient has done well with 1 hr pre-medication of (Solu-Medrol) 40mg &  (Benadryl) 50mg.  Lipitor [Atorvastatin] Myalgia    Shellfish Containing Products Hives    Tape [Adhesive] Itching and Other (comments)     \"op site-- clear,thin tape\"--caused blister     Tomato Hives    Iodine And Iodide Containing Products Rash    Olmesartan Other (comments)    Losartan Other (comments)     headaches    Percocet [Oxycodone-Acetaminophen] Other (comments)     Fever; however, current home med       OBJECTIVE  Physical Exam  Visit Vitals  /80   Pulse 67   Temp 97.9 °F (36.6 °C)   Resp 18   Ht 5' 8\" (1.727 m)   Wt 268 lb 3.2 oz (121.7 kg)   LMP 09/07/2011   BMI 40.78 kg/m²       GENERAL BRIAN: Conversant, pleasant, alert, oriented. NAD   HEENT: Oropharynx clear. Sclera anicteric. No JVD. No adenopathy   RESPIRATORY: Lung CTA. Adequate respiratory effort. Mild tachypnea after returning from the restroom. CARDIOVASC: Reg rate/rhythm, stable low grade murmur noted. Zoran Bees GASTROINT: soft, non-tender, without masses or organomegaly   EXTREMITIES: bilat 1+ edema/girth nonpitting   ALCON SURVEY: Negative   NEURO: Grossly intact, no deficits noted.         Wt Readings from Last 3 Encounters:   12/03/20 268 lb 3.2 oz (121.7 kg)   11/25/20 264 lb (119.7 kg)   11/19/20 261 lb (118.4 kg)       DATE REVIEW as available:  Lab Results   Component Value Date/Time    WBC 5.7 12/03/2020 08:10 AM    Hemoglobin (POC) 10.5 (L) 03/20/2017 12:15 PM    HGB 6.1 (L) 12/03/2020 08:10 AM    Hematocrit (POC) 31 (L) 03/20/2017 12:15 PM    HCT 20.7 (L) 12/03/2020 08:10 AM    PLATELET 880 57/36/7692 08:10 AM    MCV 94.1 12/03/2020 08:10 AM     Lab Results   Component Value Date/Time    ABS. NEUTROPHILS 3.6 12/03/2020 08:10 AM     Lab Results   Component Value Date/Time    Sodium 142 12/03/2020 08:10 AM    Potassium 3.8 12/03/2020 08:10 AM    Chloride 112 (H) 12/03/2020 08:10 AM    CO2 22 12/03/2020 08:10 AM    Anion gap 8 12/03/2020 08:10 AM    Glucose 190 (H) 12/03/2020 08:10 AM    BUN 27 (H) 12/03/2020 08:10 AM    Creatinine 1.28 (H) 12/03/2020 08:10 AM    BUN/Creatinine ratio 21 (H) 12/03/2020 08:10 AM    GFR est AA 52 (L) 12/03/2020 08:10 AM    GFR est non-AA 43 (L) 12/03/2020 08:10 AM    Calcium 8.7 12/03/2020 08:10 AM    Bilirubin, total 0.4 12/03/2020 08:10 AM    Alk. phosphatase 67 12/03/2020 08:10 AM    Protein, total 7.6 12/03/2020 08:10 AM    Albumin 2.9 (L) 12/03/2020 08:10 AM    Globulin 4.7 (H) 12/03/2020 08:10 AM    A-G Ratio 0.6 (L) 12/03/2020 08:10 AM    ALT (SGPT) 25 12/03/2020 08:10 AM     CA-125   Date Value Ref Range Status   12/03/2020 48 (H) 1.5 - 35.0 U/mL Final     Comment:     ** Note new reference range and method **  Results may vary depending on . Method used is erento     11/05/2020 52 (H) 1.5 - 35.0 U/mL Final     Comment:     ** Note new reference range and method **  Results may vary depending on . Method used is erento     10/08/2020 116 (H) 1.5 - 35.0 U/mL Final     Comment:     ** Note new reference range and method **  Results may vary depending on . Method used is erento     09/03/2020 187 (H) 1.5 - 35.0 U/mL Final     Comment:     ** Note new reference range and method **  Results may vary depending on . Method used is erento     06/11/2020 268 (H) 1.5 - 35.0 U/mL Final     Comment:     ** Note new reference range and method **  Results may vary depending on .   Method used is [employer] [place]           CT Results (most recent):  Results from Hospital Encounter encounter on 06/24/20   CT ABD PELV W CONT    Narrative EXAM: CT CHEST W CONT, CT ABD PELV W CONT    INDICATION: Ovarian cancer    COMPARISON: 10/2/2019    CONTRAST: 100 mL of Isovue-370. TECHNIQUE:   Following the uneventful intravenous administration of contrast, thin axial  images were obtained through the chest, abdomen and pelvis. Coronal and sagittal  reconstructions were generated. Oral contrast was administered. CT dose  reduction was achieved through use of a standardized protocol tailored for this  examination and automatic exposure control for dose modulation. FINDINGS:     THYROID: No nodule. MEDIASTINUM: Left paratracheal adenopathy now measures 3.7 x 2.0 cm increased in  size. Mass in AP window which extends superiorly into the left infraclavicular  region and inferiorly into the left hilum is also increase in size. When  measured in a similar fashion this measures 3.5 x 2.4 previously 3.0 x 2.9 cm. Right parasternal mass is decreased in size measuring 3.4 x 3.4 cm. Subcarinal  adenopathy measures 2.8 x 2.3 cm increased in size. There is increased size of  the nodular component adjacent to the xiphoid now measuring 2.6 x 3.3 cm. SWATI: Left hilar lymph node measures 3.2 x 2.4 cm previously 2.5 x 1.8 cm. THORACIC AORTA: No dissection or aneurysm. MAIN PULMONARY ARTERY: Normal in caliber. TRACHEA/BRONCHI: Patent. ESOPHAGUS: No wall thickening or dilatation. HEART: Normal in size. PLEURA: No effusion or pneumothorax. LUNGS: No nodule, mass, or airspace disease. LIVER: Multiple masses are noted and better visualized on current study  secondary to the addition of intravenous contrast. Comparison lesions measure  previously, there is a 3.5 x 2.8 cm mass in the right hepatic lobe series 3  image 58 slightly increased in size.  There is a 3.6 x 2.9 cm mass in the left  hepatic lobe slightly decreased in size series 3 image 56. Left hepatic lobe  mass series 3 image 45 measuring 4.0 x 2.8 cm slightly increased in size. There  is extensive tumor infiltration in the liver hilum with narrowing of the main  portal vein as well as the intrahepatic portal veins. The common hepatic artery  is not well visualized and likely occluded with some collateral flow noted in  the liver hilum. GALLBLADDER: Not visualized  SPLEEN: Mass in the splenic hilum increased in size measuring 7.4 x 4.3 cm with  invasion into the splenic hilum. PANCREAS: The pancreas is encased by tumor. ADRENALS: There is likely involvement of the left adrenal gland tumor. KIDNEYS: Right nephroureteral stent with mild to moderate right-sided  hydronephrosis. STOMACH: Tumor encasing the gastric antrum. SMALL BOWEL: No dilatation or wall thickening. COLON: Tumor abutting the tumor encasing the rectosigmoid junction. Tumor  abutting the transverse colon. APPENDIX: Unremarkable. PERITONEUM: Some free fluid noted in the pelvis. Increased size of mesenteric  masses. Representative examples: 7.8 x 4.3 cm tumor anterior peritoneum series 3  image 84. No pelvic tumor 7.5 x 4.8 cm series 3 image 100. RETROPERITONEUM:   Right pelvic sidewall increased in size. Large right lateral lymph node  measuring 3.1 x 2.0 cm not significantly changed. REPRODUCTIVE ORGANS: The visualized  URINARY BLADDER: No mass or calculus. BONES: No destructive bone lesion. ADDITIONAL COMMENTS: N/A      Impression IMPRESSION:  1. There is some variable response to therapy with several lesions decreased in  size. However a majority of the lesions have increased in size consistent with  progression of disease with involvement of the mediastinum, liver masses, and  peritoneal carcinomatosis. 2.  Increase right-sided hydronephrosis. .         IMPRESSION AND PLAN:  62 y.o. with diffusely metastatic ovarian cancer. Continues on palliative chemo, retrial taxol    Proceed with Cycle 5, Day 1. Proceed with treatment today. CKD stage 2-3 - kidney function stable. Creatinine 1.28 today. Right hydronephrosis - stent in place. Liver mets -  Monitor liver studies. She remains asymptomatic. LFTs normal.   Chronic pain - much improved. Not requiring pain medication currently. Anemia - persistent due to multiple causes, chemo hx. Hgb 6.1 today and she is symptomatic complaining of fatigue and ECKERT. Will transfuse 1 unit PRBCs today. IDDM2/SHABANA/depression - controlled overall. Continue CPAP  CIPN/diabetic polyneuropathy - not in pain, monitor. Being followed by podiatrist.  EMG shows pattern consistent with diabetic neuropathy vs chemo induced vs mixed etiology. Psychosocial - She is well supported, isaac remains very important. No complaints of depression. Will monitor. Swelling in both legs though not bothersome. Can consider lymphedema clinic if needed. Remains close to her family.     Patient Active Problem List   Diagnosis Code    Carcinomatosis ([facility] C80.0    Controlled type 2 diabetes mellitus without complication, with long-term current use of insulin (HCC) E11.9, Z79.4    Morbid obesity ([address] E66.01    Abnormal mammogram R92.8    SHABANA on CPAP G47.33, Z99.89    Dyslipidemia (high LDL; low HDL) E78.5    Acute deep vein thrombosis (DVT) of proximal vein of right lower extremity (HCC) I82.4Y1    Port-A-Cath in place Z95.828    Ovarian cancer (HCC) C56.9    Hyperglycemia due to type 2 diabetes mellitus ([facility] E11.65    Renal calculi N20.0    Pulmonary hypertension, mild (HCC) I27.20    Lymphadenopathy, mediastinal R59.0    Type 2 diabetes mellitus with nephropathy (HCC) E11.21    On antineoplastic chemotherapy Z79.899    Reactive depression F32.9    Contrast media allergy Z91.041    Diabetes (HCC) E11.9    Hypothyroidism E03.9    Anemia D64.9    Hypertension I10    Pain due to malignant neoplasm metastatic to bone (HCC) G89.3, C79.51    Mass of chest wall R22.2    S/P ureteral stent placement Z96.0    Anemia associated with chemotherapy D64.81, T45.1X5A    Lymphedema of both lower extremities I89.0           Low Citizen, PA-C  12/3/2020

## 2020-12-03 NOTE — PROGRESS NOTES
[facility] Chemo Progress Note    Date: December 3, 2020      0800 Ms. Urvashi Da Silva Arrived to [address] [place] [address] for  C5D1 Taxol ambulatory in stable condition. Assessment was completed. Patient with continued fatigue and dyspnea on exertion. Spoke to pulmonologist who requested her to obtain repeat dopplers to evaluate DVTs. Continued neuropathy reported, right lower extremity worse than left lower extremity. Port accessed with positive blood return. Labs drawn and sent for processing. Do you have any symptoms of COVID-19? SOB, coughing, fever, or generally not feeling well? NO  Have you been exposed to COVID-19 recently? NO  Have you had any recent contact with family/friend that has a pending COVID test? NO    1100 Labs reviewed. Hgb 6.1 today. Per PA, okay to treat with Taxol today. 1 unit of pRBCs also ordered. Chemotherapy Flowsheet 12/3/2020   Cycle C5D1   Date 12/3/2020   Drug / Regimen Taxol   Dosage -   Pre Hydration -   Pre Meds given   Notes given       Ms. Moore's vitals were reviewed. Patient Vitals for the past 12 hrs:   Temp Pulse Resp BP   12/03/20 1634 97.9 °F (36.6 °C) 70 18 (!) 144/83   12/03/20 1620  (!) 57 18 139/72   12/03/20 1600 97.8 °F (36.6 °C) 82 18 129/80   12/03/20 1500 97.9 °F (36.6 °C) 67 18 133/80   12/03/20 1430 98.1 °F (36.7 °C) 72 18 124/72   12/03/20 1415 98 °F (36.7 °C) 73 18 129/73   12/03/20 1355 98.1 °F (36.7 °C) 74 18 125/82   12/03/20 1338 98 °F (36.7 °C) 74 18 128/80   12/03/20 0810  89  (!) 150/81   12/03/20 0803 96.9 °F (36.1 °C)  20        Lab results were obtained and reviewed.   Recent Results (from the past 12 hour(s))   CBC WITH AUTOMATED DIFF    Collection Time: 12/03/20  8:10 AM   Result Value Ref Range    WBC 5.7 3.6 - 11.0 K/uL    RBC 2.20 (L) 3.80 - 5.20 M/uL    HGB 6.1 (L) 11.5 - 16.0 g/dL    HCT 20.7 (L) 35.0 - 47.0 %    MCV 94.1 80.0 - 99.0 FL    MCH 27.7 26.0 - 34.0 PG    MCHC 29.5 (L) 30.0 - 36.5 g/dL    RDW 19.5 (H) 11.5 - 14.5 %    PLATELET 487 805 - 109 K/uL MPV 9.8 8.9 - 12.9 FL    NRBC 0.3 (H) 0  WBC    ABSOLUTE NRBC 0.02 (H) 0.00 - 0.01 K/uL    NEUTROPHILS PENDING %    LYMPHOCYTES PENDING %    MONOCYTES PENDING %    EOSINOPHILS PENDING %    BASOPHILS PENDING %    IMMATURE GRANULOCYTES PENDING %    ABS. NEUTROPHILS PENDING K/UL    ABS. LYMPHOCYTES PENDING K/UL    ABS. MONOCYTES PENDING K/UL    ABS. EOSINOPHILS PENDING K/UL    ABS. BASOPHILS PENDING K/UL    ABS. IMM. GRANS. PENDING K/UL    DF PENDING    METABOLIC PANEL, COMPREHENSIVE    Collection Time: 12/03/20  8:10 AM   Result Value Ref Range    Sodium 142 136 - 145 mmol/L    Potassium 3.8 3.5 - 5.1 mmol/L    Chloride 112 (H) 97 - 108 mmol/L    CO2 22 21 - 32 mmol/L    Anion gap 8 5 - 15 mmol/L    Glucose 190 (H) 65 - 100 mg/dL    BUN 27 (H) 6 - 20 MG/DL    Creatinine 1.28 (H) 0.55 - 1.02 MG/DL    BUN/Creatinine ratio 21 (H) 12 - 20      GFR est AA 52 (L) >60 ml/min/1.73m2    GFR est non-AA 43 (L) >60 ml/min/1.73m2    Calcium 8.7 8.5 - 10.1 MG/DL    Bilirubin, total 0.4 0.2 - 1.0 MG/DL    ALT (SGPT) 25 12 - 78 U/L    AST (SGOT) 18 15 - 37 U/L    Alk. phosphatase 67 45 - 117 U/L    Protein, total 7.6 6.4 - 8.2 g/dL    Albumin 2.9 (L) 3.5 - 5.0 g/dL    Globulin 4.7 (H) 2.0 - 4.0 g/dL    A-G Ratio 0.6 (L) 1.1 - 2.2     MAGNESIUM    Collection Time: 12/03/20  8:10 AM   Result Value Ref Range    Magnesium 1.8 1.6 - 2.4 mg/dL       Pre-medications  were administered as ordered and chemotherapy was initiated.   Medications Administered     0.9% sodium chloride infusion     Admin Date  12/03/2020 Action  New Bag Dose  25 mL/hr Rate  25 mL/hr Route  IntraVENous Administered By  Rogelio Ramirez          0.9% sodium chloride infusion 250 mL     Admin Date  12/03/2020 Action  New Bag Dose  250 mL Rate  15 mL/hr Route  IntraVENous Administered By  Rogelio Ramirez          dexamethasone (DECADRON) 12 mg in 0.9% sodium chloride 50 mL, overfill volume 5 mL IVPB     Admin Date  12/03/2020 Action  Given Dose  12 mg Rate  232 mL/hr Route  IntraVENous Administered By  Giana Klein          diphenhydrAMINE (BENADRYL) injection 50 mg     Admin Date  12/03/2020 Action  Given Dose  50 mg Route  IntraVENous Administered By  Giana Klein          famotidine (PF) (PEPCID) 20 mg in 0.9% sodium chloride 10 mL injection     Admin Date  12/03/2020 Action  Given Dose  20 mg Route  IntraVENous Administered By  Giana Klein          heparin (porcine) pf 300-500 Units     Admin Date  12/03/2020 Action  Given Dose  500 Units Route  InterCATHeter Administered By  Giana Klein          PACLitaxeL (TAXOL) 197 mg in 0.9% sodium chloride 250 mL, overfill volume 25 mL chemo infusion     Admin Date  12/03/2020 Action  New Bag Dose  197 mg Rate  307.8 mL/hr Route  IntraVENous Administered By  Giana Klein          palonosetron HCl (ALOXI) injection 0.25 mg     Admin Date  12/03/2020 Action  Given Dose  0.25 mg Route  IntraVENous Administered By  Giana Klein          saline peripheral flush soln 10 mL     Admin Date  12/03/2020 Action  Given Dose  10 mL Route  InterCATHeter Administered By  [address], [address] [place] Patient tolerated treatment well. Port maintained positive blood return throughout treatment. Began blood transfusion. Signs/symptoms of adverse blood reaction discussed with pt, voiced understanding. 1400:  1st unit PRBCs started and infusing without difficulty, observed x 15 minutes  1620:  1st unit completed without adverse reaction noted, NS flushing line. 1640 Tolerated transfusion  well, no adverse reaction noted. Port flushed, heparinized and de accessed per protocol. D/C instructions reviewed, copy to pt, voiced understanding. Patient declined 1 hour post transfusion observation. Patient is aware of next scheduled appointment on 12/10/2020.       Future Appointments   Date Time Provider Gabriel Phillip   12/10/2020  8:00 AM C2 [facility]Y [place] [facility]   12/17/2020  8:00 AM C2 [address]   12/22/2020  2:15 PM Jose Gottlieb MD [facility] CHIKI Bautista RN  December 3, 2020

## 2020-12-10 NOTE — PROGRESS NOTES
[facility] Chemo Progress Note Date: December 10, 2020 Name: Toshia Solis MRN: [id number] : 1960 
 
0800 Ms. Evan Cedillo Arrived to [facility] for  C 5 D 8 Paclitaxel ambulatory in stable condition. Assessment was completed, no acute issues at this time, no new complaints voiced. Port accessed with positive blood return. Labs drawn and sent for processing. Patient denies fever, SOB,Cough or generalized feelings of not feeling \"well\". She denies recent exposure to positive Covid people or denies contact with anyone who has a Pending covid test 
 
Chemotherapy Flowsheet 12/10/2020 Cycle C5D8 Date 12/10/2020 Drug / Regimen Taxol Dosage -  
Pre Hydration -  
Pre Meds given Notes given Ms. Moore's vitals were reviewed. Patient Vitals for the past 12 hrs: 
 Temp Pulse Resp BP SpO2  
12/10/20 1222  70  (!) 159/82   
12/10/20 0804 97.3 °F (36.3 °C) 83 18 (!) 167/82   
12/10/20 0800     98 % Lab results were obtained and reviewed. Recent Results (from the past 12 hour(s)) CBC WITH AUTOMATED DIFF Collection Time: 12/10/20  8:10 AM  
Result Value Ref Range WBC 9.0 3.6 - 11.0 K/uL  
 RBC 2.33 (L) 3.80 - 5.20 M/uL HGB 6.6 (L) 11.5 - 16.0 g/dL HCT 21.7 (L) 35.0 - 47.0 % MCV 93.1 80.0 - 99.0 FL  
 MCH 28.3 26.0 - 34.0 PG  
 MCHC 30.4 30.0 - 36.5 g/dL  
 RDW 19.3 (H) 11.5 - 14.5 % PLATELET 882 692 - 529 K/uL MPV 10.1 8.9 - 12.9 FL  
 NRBC 1.3 (H) 0  WBC ABSOLUTE NRBC 0.12 (H) 0.00 - 0.01 K/uL NEUTROPHILS 71 32 - 75 % LYMPHOCYTES 14 12 - 49 % MONOCYTES 7 5 - 13 % EOSINOPHILS 1 0 - 7 % BASOPHILS 1 0 - 1 % IMMATURE GRANULOCYTES 6 (H) 0.0 - 0.5 % ABS. NEUTROPHILS 6.4 1.8 - 8.0 K/UL  
 ABS. LYMPHOCYTES 1.3 0.8 - 3.5 K/UL  
 ABS. MONOCYTES 0.6 0.0 - 1.0 K/UL  
 ABS. EOSINOPHILS 0.1 0.0 - 0.4 K/UL  
 ABS. BASOPHILS 0.1 0.0 - 0.1 K/UL  
 ABS. IMM.  GRANS. 0.5 (H) 0.00 - 0.04 K/UL  
 DF SMEAR SCANNED    
 RBC COMMENTS ANISOCYTOSIS 
1+ 
    
 METABOLIC PANEL, BASIC Collection Time: 12/10/20  8:10 AM  
Result Value Ref Range Sodium 142 136 - 145 mmol/L Potassium 4.1 3.5 - 5.1 mmol/L Chloride 110 (H) 97 - 108 mmol/L  
 CO2 25 21 - 32 mmol/L Anion gap 7 5 - 15 mmol/L Glucose 181 (H) 65 - 100 mg/dL BUN 29 (H) 6 - 20 MG/DL Creatinine 1.20 (H) 0.55 - 1.02 MG/DL  
 BUN/Creatinine ratio 24 (H) 12 - 20 GFR est AA 56 (L) >60 ml/min/1.73m2 GFR est non-AA 46 (L) >60 ml/min/1.73m2 Calcium 8.8 8.5 - 10.1 MG/DL  
TYPE & SCREEN Collection Time: 12/10/20  9:47 AM  
Result Value Ref Range Crossmatch Expiration 12/13/2020,2359 ABO/Rh(D) O POSITIVE Antibody screen NEG   
URINALYSIS W/MICROSCOPIC Collection Time: 12/10/20 11:22 AM  
Result Value Ref Range Color YELLOW/STRAW Appearance CLOUDY (A) CLEAR Specific gravity 1.011 1.003 - 1.030    
 pH (UA) 6.0 5.0 - 8.0 Protein 30 (A) NEG mg/dL Glucose Negative NEG mg/dL Ketone Negative NEG mg/dL Bilirubin Negative NEG Blood LARGE (A) NEG Urobilinogen 0.2 0.2 - 1.0 EU/dL Nitrites Positive (A) NEG Leukocyte Esterase LARGE (A) NEG    
  (H) 0 - 4 /hpf  
  (H) 0 - 5 /hpf Epithelial cells 0 (A) FEW /lpf Bacteria 984.6 (A) NEG /hpf Hyaline cast 0.8 0 - 5 /lpf  
 
 
Pre-medications  were administered as ordered and chemotherapy was initiated. Medications Administered 0.9% sodium chloride infusion Admin Date 
12/10/2020 Action New Bag Dose 25 mL/hr Rate 25 mL/hr Route IntraVENous Administered By 
Charli Snyder RN  
  
  
 dexamethasone (DECADRON) 12 mg in 0.9% sodium chloride 50 mL, overfill volume 5 mL IVPB Admin Date 
12/10/2020 Action Given Dose 
12 mg Rate 232 mL/hr Route IntraVENous Administered By 
Charli Snyder RN  
  
  
 diphenhydrAMINE (BENADRYL) injection 50 mg   
 Admin Date 
12/10/2020 Action Given Dose 50 mg Route IntraVENous Administered By 
Charli Snyder, NIKIA  
  
  
 famotidine (PF) (PEPCID) 20 mg in 0.9% sodium chloride 10 mL injection Admin Date 
12/10/2020 Action Given Dose 
20 mg Route IntraVENous Administered By 
Fran Berrios RN  
  
  
 heparin (porcine) pf 300-500 Units Admin Date 
12/10/2020 Action Given Dose 
500 Units Route InterCATHeter Administered By 
Fran Berrios RN  
  
  
 PACLitaxeL (TAXOL) 197 mg in 0.9% sodium chloride 250 mL, overfill volume 25 mL chemo infusion Admin Date 
12/10/2020 Action New Bag Dose 
197 mg Rate 
307.8 mL/hr Route IntraVENous Administered By 
Fran Berrios RN  
  
  
 palonosetron HCl (ALOXI) injection 0.25 mg   
 Admin Date 
12/10/2020 Action Given Dose 0.25 mg Route IntraVENous Administered By 
Fran Berrios RN  
  
  
 saline peripheral flush soln 10 mL Admin Date 
12/10/2020 Action Given Dose 
10 mL Route InterCATHeter Administered By 
Fran Berrios RN  
  
  
  
 
 
 
5831 Patient tolerated treatment well. Port  maintained positive blood return throughout treatment. Port flushed, heparinized and de accessed per protocol. Patient was discharged from [facility]. Patient instructed to  a prescription from her pharmacy on her way home to  Treat her urine infection and she voiced that she is following up with her Kidney doctor soon. Future Appointments Date Time Provider Gabriel Phillip 12/17/2020  8:00 AM [address]  
12/22/2020  2:15 PM Jonathan Lawson MD [facility] CHIKI Pappas RN December 10, 2020

## 2020-12-10 NOTE — PROGRESS NOTES
[address] [facility], [address] [facility], [address] [place] 
P [phone]  F [phone] Office Visit Patient ID: 
Name: Amarjit Lou MRN: [id number] : 1960/60 y.o. Visit date: 12/10/2020 LEONID Johnson is a 62 y.o.  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 The patient's previous treatment include primary debulking and adjuvant Taxol/carbo with retreatment in  for recurrence. Subsequent lines of chemo as outlined below. She has most recently started single agent weekly Taxol on 18 following progression with chest wall involvement while on PARPi. Most recent EOD imaging in 2020 shows a variable response to her prior therapy though with mostly increased disease including liver, mediastium and abd with progressive right hydronephrosis. She is s/p ureteral stent placement.   
   
 
OncTx History: 
12: MARAH/BSO/Cytoreductive surgery on 12 noted carcinomatosis/omental caking. 10/2012-2013: 6 cycles Carbo/Taxol 2014-2014: 6 Cycles carbo/Taxol 3/2015-5/13/15: 3 cycles Avastin/Doxil until progression 2015-2015 Weekly Topotecan  
2015-3/2016: Weekly Topotecan 2017-2017Kelle Letters D1/D8 Q28 days for 6 cycles 2018: CT core needle bx chest wall mass: Metastatic high-grade serous carcinoma (see comment) Positive for malignancy. 2018-2018: Taxol weekly 2019: 10 fx 30cGy palliative RT to sternal lesion 2019 - 2020 [place]. Underwent right ureteral stent placement on 3/18/19. 
2020: CT CAP variable response to therapy with several lesions decreased in size. However a majority of the lesions have increased in size consistent with progression of disease with involvement of the mediastinum, liver masses, and peritoneal carcinomatosis. Increase right-sided hydronephrosis. Increasing . 8/2020 - initiated Taxol weekly 10/2020 - RLE DVT on eliquis CT Results (most recent): 
Results from [facility] Encounter encounter on 06/24/20 CT ABD PELV W CONT Narrative EXAM: CT CHEST W CONT, CT ABD PELV W CONT INDICATION: Ovarian cancer COMPARISON: 10/2/2019 CONTRAST: 100 mL of Isovue-370. TECHNIQUE:  
Following the uneventful intravenous administration of contrast, thin axial 
images were obtained through the chest, abdomen and pelvis. Coronal and sagittal 
reconstructions were generated. Oral contrast was administered. CT dose 
reduction was achieved through use of a standardized protocol tailored for this 
examination and automatic exposure control for dose modulation. FINDINGS:  
 
THYROID: No nodule. MEDIASTINUM: Left paratracheal adenopathy now measures 3.7 x 2.0 cm increased in 
size. Mass in AP window which extends superiorly into the left infraclavicular 
region and inferiorly into the left hilum is also increase in size. When 
measured in a similar fashion this measures 3.5 x 2.4 previously 3.0 x 2.9 cm. Right parasternal mass is decreased in size measuring 3.4 x 3.4 cm. Subcarinal 
adenopathy measures 2.8 x 2.3 cm increased in size. There is increased size of 
the nodular component adjacent to the xiphoid now measuring 2.6 x 3.3 cm. SWATI: Left hilar lymph node measures 3.2 x 2.4 cm previously 2.5 x 1.8 cm. THORACIC AORTA: No dissection or aneurysm. MAIN PULMONARY ARTERY: Normal in caliber. TRACHEA/BRONCHI: Patent. ESOPHAGUS: No wall thickening or dilatation. HEART: Normal in size. PLEURA: No effusion or pneumothorax. LUNGS: No nodule, mass, or airspace disease. LIVER: Multiple masses are noted and better visualized on current study 
secondary to the addition of intravenous contrast. Comparison lesions measure 
previously, there is a 3.5 x 2.8 cm mass in the right hepatic lobe series 3 
image 58 slightly increased in size. There is a 3.6 x 2.9 cm mass in the left hepatic lobe slightly decreased in size series 3 image 56. Left hepatic lobe 
mass series 3 image 45 measuring 4.0 x 2.8 cm slightly increased in size. There 
is extensive tumor infiltration in the liver hilum with narrowing of the main 
portal vein as well as the intrahepatic portal veins. The common hepatic artery 
is not well visualized and likely occluded with some collateral flow noted in 
the liver hilum. GALLBLADDER: Not visualized SPLEEN: Mass in the splenic hilum increased in size measuring 7.4 x 4.3 cm with 
invasion into the splenic hilum. PANCREAS: The pancreas is encased by tumor. ADRENALS: There is likely involvement of the left adrenal gland tumor. KIDNEYS: Right nephroureteral stent with mild to moderate right-sided 
hydronephrosis. STOMACH: Tumor encasing the gastric antrum. SMALL BOWEL: No dilatation or wall thickening. COLON: Tumor abutting the tumor encasing the rectosigmoid junction. Tumor 
abutting the transverse colon. APPENDIX: Unremarkable. PERITONEUM: Some free fluid noted in the pelvis. Increased size of mesenteric 
masses. Representative examples: 7.8 x 4.3 cm tumor anterior peritoneum series 3 
image 84. No pelvic tumor 7.5 x 4.8 cm series 3 image 100. RETROPERITONEUM:  
Right pelvic sidewall increased in size. Large right lateral lymph node 
measuring 3.1 x 2.0 cm not significantly changed. REPRODUCTIVE ORGANS: The visualized URINARY BLADDER: No mass or calculus. BONES: No destructive bone lesion. ADDITIONAL COMMENTS: N/A Impression IMPRESSION: 
1. There is some variable response to therapy with several lesions decreased in 
size. However a majority of the lesions have increased in size consistent with 
progression of disease with involvement of the mediastinum, liver masses, and 
peritoneal carcinomatosis. 2.  Increase right-sided hydronephrosis. Mireya Ruiz CT 10/2019: 
FINDINGS: 
THYROID: Visualized gland is unremarkable. MEDIASTINUM: Upper left paratracheal node measures 2.2 x 3.4 cm (2, 8), 
previously 2.3 x 3.5 cm. Superior prevascular node measures 2.9 x 3.0 cm, 
previously 3.5 x 3.8 cm. Poorly delineated subcarinal node measures about 3.4 x 
4.8 cm, previously 3.6 x 4.8 cm. Precardiac nodes measure 1.9 x 2.5 cm and 1.7 x 
2.4 cm (2, 39), previously 2.1 x 2.7 cm and 2.0 x 2.0 cm. SWATI: No mass or lymphadenopathy. THORACIC AORTA: No dissection or aneurysm. MAIN PULMONARY ARTERY: Normal in caliber. TRACHEA/BRONCHI: Patent. ESOPHAGUS: No wall thickening or dilatation. HEART: Normal in size. PLEURA: No effusion or pneumothorax. LUNGS: No nodule, mass, or airspace disease. LIVER: Multiple hypodense lesions suspicious for metastases, measuring up to 2.5 
x 4.0 cm in segment 2 (2, 43) series, previously 2.8 x 3.7 cm, 3.2 x 4.6 cm in 
segment 3 (2, 55), previously 3.1 x 4.5 cm, and partially exophytic from the 
posterior inferior right lobe segment 6 measuring 2.5 x 3.5 cm, previously 2.8 x 
3.4 cm. GALLBLADDER: Unremarkable. SPLEEN: Central 1.9 x 3.1 cm hypodensity (2, 47), previously 1.9 x 2.3 cm. Otherwise unremarkable. PANCREAS: Inseparable from bulky central abdominal adenopathy with no identified 
ductal dilation. ADRENALS: Unremarkable. KIDNEYS: Right hydronephrosis has improved status post right nephroureteral 
stent placement. Kidneys otherwise appear unremarkable. STOMACH: Unremarkable. SMALL BOWEL: No dilatation or wall thickening. COLON: No dilation or wall thickening. APPENDIX: Unremarkable. PERITONEUM: Extensive peritoneal implants with representative largest lesions 
measuring 4.4 x 6.1 cm and the left adnexal region (2, 98), previously 4.3 x 5.7 
cm, 3.8 x 5.9 cm in the anterior midline (2, 81), previously 3.4 x 5.4 cm, and 
2.8 x 4.7 cm in the right abdomen (2, 78), previously 3.2 x 4.7 cm. RETROPERITONEUM: Extensive rocio hepatis, retroperitoneal, and pelvic sidewall adenopathy. Conglomerate nkechi mass in the rocio hepatis region measures 5.7 x 
11.1 cm (2, 58) is, previously 6.3 x 12.2 cm. Aortocaval node measures 3.7 x 5.3 
cm (2, 77), previously 4.5 x 5.4 cm. Right pelvic sidewall node measures 2.9 x 
4.2 cm (2, 103), previously 3.4 x 4.2 cm. Left external iliac node measures 3.0 
x 4.8 cm (2, 101), previously 3.3 x 4.8 cm. REPRODUCTIVE ORGANS: Uterus and ovaries are surgically absent. URINARY BLADDER: No mass or calculus. Distal end of right nephroureteral stent 
positioned within bladder lumen. BONES: Degenerative spine change. No acute fracture or aggressive lesion. ADDITIONAL COMMENTS: Mass centered at the right sternocostal junction measures 5.3 x 5.4 cm (2, 21), previously 4.8 x 5.7 cm. Post surgical changes in the 
anterior abdominal wall. Left Port-A-Cath in place. Bilateral inguinal 
adenopathy measuring 2.1 x 3.2 cm on the right (2, 115), previously 2.3 x 3.7 cm 
and 2.7 x 2.8 cm on the left (2, 108), previously 2.9 x 3.1 cm. . 
  
IMPRESSION: Overall response to disease with either decrease in size or no 
significant change in extensive nkechi metastases, hepatic metastases, right 
sternal metastasis, and peritoneal carcinomatosis as above. No new metastasis or 
progressive metastasis identified. Status post right nephroureteral stent with 
interval resolution of right hydronephrosis. SUBJECTIVE: 
Presents to [facility]. Doing well today. Still passing clot/bloody urine. Stent recently manipulated. She remains on eliquis for persistent RLE DVT. Swelling/discomfort has subsided. No CP/SOB. Remains fatigued but continues to exercise. She denies any abd/chest/back pain currently. She ambulates at home with rollator or cane PRN. Goes up/down stairs slowly and unassisted currently. No weakness/light headedness. No falls. No N/V. Appetite stable. She drinks 4 bottles water/day with gatorade. +BM daily to every other day with miralax.   Takes tramadol nightly for generalized discomforts. Two adult sons at home who cook for her. She is also fostering two boys 15 and 12. The older is moving out likely later this year to reunite with his mother. She is contemplating adopting the younger. Confirms if she passes, her son Satish will adopt him. She has discussed this with the adoption agency. She has chronic pedal/hand numbness/tingling due to taxol and DM. She wakes from sleep occasionally d/t this but resumes sleep easily. Follows with podiatrist. Denies any change in depression, continues medication. ROS 10 pt negative except as per above PMH: 
Past Medical History:  
Diagnosis Date  Adverse effect of anesthesia   
 sleep apnea cpap machine useage  Anemia  Arthritis DDD low back and knees  Asthma   
 uses albuterol prn only; none x 6 mos. Never hospitalized  BRCA negative 1/2013  Calculus of kidney  Chronic kidney disease   
 kidney stones  Chronic pain   
 knee pain bilat  CINV (chemotherapy-induced nausea and vomiting) 8/21/2014  Decreased hearing, right PATIENT STATES THIS IS DUE TO CHEMOTHERAPY  Diabetes ([address] Age 46 IDDM  Environmental allergies  Fibromyalgia  GERD (gastroesophageal reflux disease)   
 controlled with med  Hx of pulmonary embolus during pregnancy 1/18/2015  Hydronephrosis due to obstruction of ureter 3/18/2019  Hypertension  Ill-defined condition Sepsis 9-2015 -  SOURCE PORT  
 Ill-defined condition 2016  
 chemotherapy  Mass of chest wall 05/2018  Morbid obesity ([address]  Murmur, heart  Other and unspecified hyperlipidemia  Ovarian cancer ([address] 9/2012, 1/2014  
 serous, high grade, stage IIIc. s/p chemotx (has port)  Psychiatric disorder Depression/Anxiety  Rectal bleeding  Thromboembolus ([address] 1993 POST PARTUM; resolved- PELVIS  Thyroid disease HYPOTHYROID  Unspecified sleep apnea CPAP, Dr. Alessandro Mike PSH: 
 Past Surgical History:  
Procedure Laterality Date  BREAST SURGERY PROCEDURE UNLISTED Lymph node in left breast 2017 Walter  
  X2  
 HX HYSTERECTOMY  2012 ROULA/BSO, tumor debulking, omentectomy for ovarian cancer  HX ORTHOPAEDIC Right   
 ankle-FX, R  
 HX ORTHOPAEDIC Right   
 FX R ARM  
 HX UROLOGICAL Right 2017 STENT FOR KIDNEY STONES  
 HX VASCULAR ACCESS  2015  
 right chest- port placed  HX VASCULAR ACCESS Left 2017 TETE CATH  
 
SOC: 
Social History Socioeconomic History  Marital status:  Spouse name: Not on file  Number of children: Not on file  Years of education: Not on file  Highest education level: Not on file Occupational History  Not on file Social Needs  Financial resource strain: Not on file  Food insecurity Worry: Not on file Inability: Not on file  Transportation needs Medical: Not on file Non-medical: Not on file Tobacco Use  Smoking status: Never Smoker  Smokeless tobacco: Never Used Substance and Sexual Activity  Alcohol use: Yes Comment: 1 drink per month  Drug use: No  
 Sexual activity: Yes Lifestyle  Physical activity Days per week: Not on file Minutes per session: Not on file  Stress: Not on file Relationships  Social connections Talks on phone: Not on file Gets together: Not on file Attends Anabaptist service: Not on file Active member of club or organization: Not on file Attends meetings of clubs or organizations: Not on file Relationship status: Not on file  Intimate partner violence Fear of current or ex partner: Not on file Emotionally abused: Not on file Physically abused: Not on file Forced sexual activity: Not on file Other Topics Concern  Not on file Social History Narrative Lives in [facility].  passed away in  of a heart attack. Has 2 sons. Disability. 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 Enjoys swimming and going to the gym. Family History Family History Problem Relation Age of Onset  Hypertension Mother Mo Filter Arthritis-osteo Mother  Hypertension Father  Arthritis-osteo Father  Cancer Father PROSTATE  COPD Father  Hypertension Brother  Elevated Lipids Brother  Arthritis-osteo Brother  Hypertension Brother  Elevated Lipids Brother  Obesity Brother  Cancer Brother PROSTATE  Anesth Problems Son DELAYED AWAKENING  
 Diabetes Maternal Grandmother  Anesth Problems Paternal Grandmother PATIENT STATES GRANDMOTHER'S HEART STOPPED DURING SURGERY Medications: (reviewed) Current Outpatient Medications on File Prior to Encounter Medication Sig Dispense Refill  apixaban (Eliquis) 5 mg tablet Take 1 Tab by mouth two (2) times a day for 30 days. 60 Tab 2  
 omega-3 acid ethyl esters (LOVAZA) 1 gram capsule TAKE 1 CAPSULE BY MOUTH TWICE A  Cap 2  
 traMADoL (ULTRAM) 50 mg tablet Take 50 mg by mouth every six (6) hours as needed for Pain.  rosuvastatin (CRESTOR) 10 mg tablet Take 10 mg by mouth nightly.  doxazosin (CARDURA) 1 mg tablet Take  by mouth daily.  doxazosin (CARDURA) 1 mg tablet Take 1 mg by mouth daily.  SULFAMETHOXAZOLE-TRIMETHOPRIM PO Take  by mouth two (2) times a day. 7 days  dexAMETHasone (DECADRON) 4 mg tablet Take 2 tablets with breakfast the day before chemo and for 2 days after chemo 36 Tab 2  
 lidocaine-prilocaine (EMLA) topical cream Apply small amount over port area and cover with band aid one hour before chemo 30 g 3  
 ondansetron (ZOFRAN ODT) 4 mg disintegrating tablet Take 1 Tab by mouth every eight (8) hours as needed for Nausea. Indications: nausea and vomiting caused by cancer drugs 30 Tab 2  predniSONE (DELTASONE) 50 mg tablet Take 1 tablet 13 hours prior to scan, take 1 tablet 7 hours prior to scan and take 1 tablet one hour prior to scan 3 Tab 0  Ze[place] 100 mg cap Take 100 mg by mouth daily. 60 Cap 4  
 fluticasone propionate (FLONASE) 50 mcg/actuation nasal spray 2 SPRAYS IN BOTH NOSTRILS DAILY 1 Bottle 0  
 cycloSPORINE (RESTASIS) 0.05 % dpet  albuterol (PROVENTIL VENTOLIN) 2.5 mg /3 mL (0.083 %) nebu Take 2.5 mg by inhalation.  dextroamphetamine-amphetamine (ADDERALL) 20 mg tablet TAKE 1 TABLET BY MOUTH EVERY DAY  0  
 rosuvastatin (CRESTOR) 10 mg tablet TAKE 1 TABLET BY MOUTH EVERY DAY  0  
 telmisartan (MICARDIS) 20 mg tablet TAKE 1 TABLET BY MOUTH EVERY DAY 90 Tab 3  
 atorvastatin (LIPITOR) 20 mg tablet  BD ULTRA-FINE SHORT PEN NEEDLE 31 gauge x 5/16\" ndle USE TO INJECT INSULIN 5 TIMES DAILY 200 Pen Needle 11  
 insulin aspart U-100 (NOVOLOG FLEXPEN U-100 INSULIN) 100 unit/mL (3 mL) inpn INJECT 20 UNITS BEFORE EACH MEAL + 4 UNITS FOR EVERY 50 MG/DL ABOVE 150 MG/DL.  UNITS PER DAY 90 Adjustable Dose Pre-filled Pen Syringe 3  
 LEVEMIR FLEXTOUCH U-100 INSULN 100 unit/mL (3 mL) inpn INJECT 30 UNITS IN AM AND 50 UNITS AT NIGHT. INCREASE AS DIRECTED TO  UNITS/DAY. 90 Adjustable Dose Pre-filled Pen Syringe 3  DEXILANT 60 mg CpDB capsule (delayed release) TAKE 1 CAPSULE BY MOUTH EVERY DAY 90 Cap 3  
 LINZESS 145 mcg cap capsule TAKE 1 CAPSULE BY MOUTH EVERY DAYY 30 Cap 0  
 levothyroxine (SYNTHROID) 150 mcg tablet TAKE 1 TABLET BY MOUTH EVERY DAY BEFORE BREAKFAST 90 Tab 0  
 EPINEPHrine (EPIPEN) 0.3 mg/0.3 mL injection INJECT 0.3 ML BY INTRAMUSCULAR ROUTE ONCE AS NEEDED FOR UP TO 1 DOSE.  1  
 glucose blood VI test strips (TRUE METRIX GLUCOSE TEST STRIP) strip by Does Not Apply route See Admin Instructions. 30 Strip 5  
 lancets misc by Does Not Apply route daily. True Metrix lancets- test blood sugar once per day 30 Each 5  Blood-Glucose Meter monitoring kit Check up to tid, as directed 1 Kit 0  
  azelastine (ASTELIN) 137 mcg (0.1 %) nasal spray 1 [place] by Both Nostrils route daily as needed. Use in each nostril as directed  insulin detemir U-100 (LEVEMIR) 100 unit/mL injection 20 Units by SubCUTAneous route daily (with lunch).  montelukast (SINGULAIR) 10 mg tablet Take 10 mg by mouth nightly.  ascorbic acid, vitamin C, (VITAMIN C) 1,000 mg tablet Take 1,000 mg by mouth daily.  albuterol (PROVENTIL VENTOLIN) 2.5 mg /3 mL (0.083 %) nebulizer solution 2.5 mg by Nebulization route every four (4) hours as needed for Wheezing.  Liraglutide (VICTOZA) 0.6 mg/0.1 mL (18 mg/3 mL) pnij 1.8 mg by SubCUTAneous route daily (with lunch). 3 Pen 3  
 SYMBICORT 160-4.5 mcg/actuation HFAA Take 2 Puffs by inhalation two (2) times daily as needed. 2 Inhaler 3  clonazePAM (KLONOPIN) 0.5 mg tablet Take 0.5 mg by mouth nightly as needed.  sertraline (ZOLOFT) 50 mg tablet TAKE 1 TABLET BY MOUTH as needed  1  cholecalciferol, VITAMIN D3, (VITAMIN D3) 5,000 unit tab tablet Take 5,000 Units by mouth daily.  furosemide (LASIX) 40 mg tablet Take 40 mg by mouth every other day. 3 No current facility-administered medications on file prior to encounter. Allergies: (reviewed) Allergies Allergen Reactions  Carboplatin Other (comments) Patient developed shortness of breath, felt faint, coughing, skin flushed and \"itchy\". This occurred on the patients 8th treatment.  Iodinated Contrast Media Rash 13 hr pre-medication prior to IV contrast.  
Patient has done well with 1 hr pre-medication of (Solu-Medrol) 40mg &  (Benadryl) 50mg.  Lipitor [Atorvastatin] Myalgia  Shellfish Containing Products Hives  Tape [Adhesive] Itching and Other (comments) \"op site-- clear,thin tape\"--caused blister  Tomato Hives  Iodine And Iodide Containing Products Rash  Olmesartan Other (comments)  Losartan Other (comments)  
  headaches  Percocet [Oxycodone-Acetaminophen] Other (comments) Fever; however, current home med OBJECTIVE Physical Exam 
Visit Vitals BP (!) 167/82 Pulse 83 Temp 97.3 °F (36.3 °C) Resp 18 Ht 5' 8\" (1.727 m) Wt 276 lb (125.2 kg) LMP 09/07/2011 SpO2 98% Breastfeeding No  
BMI 41.97 kg/m² GENERAL BRIAN: Conversant, pleasant, alert, oriented. NAD HEENT: Oropharynx clear. Sclera anicteric. No JVD. No adenopathy RESPIRATORY: Lung CTA, No rales/rhonchi. Adequate respiratory effort. CARDIOVASC: Reg rate/rhythm, stable murmur low grade. No visible chest lesion. GASTROINT: soft, non-tender, without masses or organomegaly, no fluid wave. Habitus limits exam.  
EXTREMITIES: bilat 1+ edema/girth nonpitting < RLE. Lightly tender R ankle w/o erythema. Foot warm, mobile, sensate. ALCON SURVEY: Negative NEURO: Grossly intact, no acute deficit. Oriented. Not depressed. Not agitated. Wt Readings from Last 3 Encounters:  
12/10/20 276 lb (125.2 kg) 12/03/20 268 lb 3.2 oz (121.7 kg) 11/25/20 264 lb (119.7 kg) DATE REVIEW as available: 
Lab Results Component Value Date/Time WBC 9.0 12/10/2020 08:10 AM  
 Hemoglobin (POC) 10.5 (L) 03/20/2017 12:15 PM  
 HGB 6.6 (L) 12/10/2020 08:10 AM  
 Hematocrit (POC) 31 (L) 03/20/2017 12:15 PM  
 HCT 21.7 (L) 12/10/2020 08:10 AM  
 PLATELET 871 29/40/2196 08:10 AM  
 MCV 93.1 12/10/2020 08:10 AM  
 
Lab Results Component Value Date/Time  
 ABS. NEUTROPHILS 6.4 12/10/2020 08:10 AM  
 
Lab Results Component Value Date/Time  Sodium 142 12/10/2020 08:10 AM  
 Potassium 4.1 12/10/2020 08:10 AM  
 Chloride 110 (H) 12/10/2020 08:10 AM  
 CO2 25 12/10/2020 08:10 AM  
 Anion gap 7 12/10/2020 08:10 AM  
 Glucose 181 (H) 12/10/2020 08:10 AM  
 BUN 29 (H) 12/10/2020 08:10 AM  
 Creatinine 1.20 (H) 12/10/2020 08:10 AM  
 BUN/Creatinine ratio 24 (H) 12/10/2020 08:10 AM  
 GFR est AA 56 (L) 12/10/2020 08:10 AM  
 GFR est non-AA 46 (L) 12/10/2020 08:10 AM  
 Calcium 8.8 12/10/2020 08:10 AM  
 Bilirubin, total 0.4 12/03/2020 08:10 AM  
 Alk. phosphatase 67 12/03/2020 08:10 AM  
 Protein, total 7.6 12/03/2020 08:10 AM  
 Albumin 2.9 (L) 12/03/2020 08:10 AM  
 Globulin 4.7 (H) 12/03/2020 08:10 AM  
 A-G Ratio 0.6 (L) 12/03/2020 08:10 AM  
 ALT (SGPT) 25 12/03/2020 08:10 AM  
 
 
CA-125 Date Value Ref Range Status 12/03/2020 48 (H) 1.5 - 35.0 U/mL Final  
  Comment:  
  ** Note new reference range and method ** Results may vary depending on . Method used is [employer] 
  
11/05/2020 52 (H) 1.5 - 35.0 U/mL Final  
  Comment:  
  ** Note new reference range and method ** Results may vary depending on . Method used is [employer] 10/08/2020 116 (H) 1.5 - 35.0 U/mL Final  
  Comment:  
  ** Note new reference range and method ** Results may vary depending on . Method used is [employer] 
  
09/03/2020 187 (H) 1.5 - 35.0 U/mL Final  
  Comment:  
  ** Note new reference range and method ** Results may vary depending on . Method used is [employer] 
  
06/11/2020 268 (H) 1.5 - 35.0 U/mL Final  
  Comment:  
  ** Note new reference range and method ** Results may vary depending on . Method used is [employer] ECHO 1/2020 · Normal cavity size and systolic function (ejection fraction normal). Mild concentric hypertrophy. Estimated left ventricular ejection fraction is 55 - 60%. Visually measured ejection fraction. No regional wall motion abnormality noted. · Mildly dilated left atrium. · Mild aortic valve sclerosis. · Trace mitral valve regurgitation is present. IMPRESSION AND PLAN: 
62 y.o. with diffusely metastatic ovarian cancer. Continues on palliative chemo, retrial taxol C5 Taxol, ongoing. Responsive chemically. CT next month. CKD stage 2-3 - stable. Continue home hydration. Right hydronephrosis - stent in place. Exchanged October 2020. Liver mets -  asx Pain d/t disease/neuropathy -tramadol Refill RLE DVT acute/chronic - stable on eliquis. If Issues persist or PE present, consider lovenox/filter. Continued urologic bleeding with stent, f/u with urology pending. Consider risk/benefit of AOC with persistent anemia. Anemia - persistent d/t multiple causes with urologic losses. VSS, no change from her baseline. Monitor SOB/debility - hx Asthma, chronic due to condition, anemia. Consider home bed d/t stairs. Discussed concerns with her. Home PT for now. IDDM2/SHABANA/depression - controlled overall. Continue CPAP, f/u pulmonary pending CIPN/diabetic polyneuropathy - not in pain, monitor. Discussed home safety. Wt loss, prot smiley malnutrition - supplements/alimentation as best as able. Psychosocial - she has some fear about the future. She is well supported, isaac remains very important. Monitor depressive sx mgmt. Discussed home life and plans for the future. Plans for adopting her foster child with support of her son Marshall Braga PA-C 
12/10/2020

## 2020-12-16 NOTE — TELEPHONE ENCOUNTER
Pt , she states she got a message on my chart that her urine results are back. Pt states she would like to know if she needs a different ABX. She will be finished her Amox tomorrow.

## 2020-12-17 NOTE — TELEPHONE ENCOUNTER
I called ptJOSUE, neil leblanc    She does not. There was an infection and fortunately it was susceptible to this antibiotic.  Please ask if she is still having symptoms.  Thanks   [employer]

## 2020-12-17 NOTE — PROGRESS NOTES
[address], [address] [facility], [address] 
P [phone]  F [phone] Office Visit Patient ID: 
Name: Whitney Singh MRN: [id number] : 1960/60 y.o. Visit date: 2020 LEONID Luther is a 62 y.o.  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 The patient's previous treatment include primary debulking and adjuvant Taxol/carbo with retreatment in  for recurrence. Subsequent lines of chemo as outlined below. She has most recently started single agent weekly Taxol on 18 following progression with chest wall involvement while on PARPi. Most recent EOD imaging in 2020 shows a variable response to her prior therapy though with mostly increased disease including liver, mediastium and abd with progressive right hydronephrosis. She is s/p ureteral stent placement.   
   
 
OncTx History: 
12: MARAH/BSO/Cytoreductive surgery on 12 noted carcinomatosis/omental caking. 10/2012-2013: 6 cycles Carbo/Taxol 2014-2014: 6 Cycles carbo/Taxol 3/2015-5/13/15: 3 cycles Avastin/Doxil until progression 2015-2015 Weekly Topotecan  
2015-3/2016: Weekly Topotecan 2017-2017Joen Genera D1/D8 Q28 days for 6 cycles 2018: CT core needle bx chest wall mass: Metastatic high-grade serous carcinoma (see comment) Positive for malignancy. 2018-2018: Taxol weekly 2019: 10 fx 30cGy palliative RT to sternal lesion 2019 - 2020 [place]. Underwent right ureteral stent placement on 3/18/19. 
2020: CT CAP variable response to therapy with several lesions decreased in size. However a majority of the lesions have increased in size consistent with progression of disease with involvement of the mediastinum, liver masses, and peritoneal carcinomatosis. Increase right-sided hydronephrosis. Increasing . 8/2020 - initiated Taxol weekly 10/2020 - RLE DVT on eliquis CT Results (most recent): 
Results from [facility] Encounter encounter on 06/24/20 CT ABD PELV W CONT Narrative EXAM: CT CHEST W CONT, CT ABD PELV W CONT INDICATION: Ovarian cancer COMPARISON: 10/2/2019 CONTRAST: 100 mL of Isovue-370. TECHNIQUE:  
Following the uneventful intravenous administration of contrast, thin axial 
images were obtained through the chest, abdomen and pelvis. Coronal and sagittal 
reconstructions were generated. Oral contrast was administered. CT dose 
reduction was achieved through use of a standardized protocol tailored for this 
examination and automatic exposure control for dose modulation. FINDINGS:  
 
THYROID: No nodule. MEDIASTINUM: Left paratracheal adenopathy now measures 3.7 x 2.0 cm increased in 
size. Mass in AP window which extends superiorly into the left infraclavicular 
region and inferiorly into the left hilum is also increase in size. When 
measured in a similar fashion this measures 3.5 x 2.4 previously 3.0 x 2.9 cm. Right parasternal mass is decreased in size measuring 3.4 x 3.4 cm. Subcarinal 
adenopathy measures 2.8 x 2.3 cm increased in size. There is increased size of 
the nodular component adjacent to the xiphoid now measuring 2.6 x 3.3 cm. SWATI: Left hilar lymph node measures 3.2 x 2.4 cm previously 2.5 x 1.8 cm. THORACIC AORTA: No dissection or aneurysm. MAIN PULMONARY ARTERY: Normal in caliber. TRACHEA/BRONCHI: Patent. ESOPHAGUS: No wall thickening or dilatation. HEART: Normal in size. PLEURA: No effusion or pneumothorax. LUNGS: No nodule, mass, or airspace disease. LIVER: Multiple masses are noted and better visualized on current study 
secondary to the addition of intravenous contrast. Comparison lesions measure 
previously, there is a 3.5 x 2.8 cm mass in the right hepatic lobe series 3 
image 58 slightly increased in size. There is a 3.6 x 2.9 cm mass in the left hepatic lobe slightly decreased in size series 3 image 56. Left hepatic lobe 
mass series 3 image 45 measuring 4.0 x 2.8 cm slightly increased in size. There 
is extensive tumor infiltration in the liver hilum with narrowing of the main 
portal vein as well as the intrahepatic portal veins. The common hepatic artery 
is not well visualized and likely occluded with some collateral flow noted in 
the liver hilum. GALLBLADDER: Not visualized SPLEEN: Mass in the splenic hilum increased in size measuring 7.4 x 4.3 cm with 
invasion into the splenic hilum. PANCREAS: The pancreas is encased by tumor. ADRENALS: There is likely involvement of the left adrenal gland tumor. KIDNEYS: Right nephroureteral stent with mild to moderate right-sided 
hydronephrosis. STOMACH: Tumor encasing the gastric antrum. SMALL BOWEL: No dilatation or wall thickening. COLON: Tumor abutting the tumor encasing the rectosigmoid junction. Tumor 
abutting the transverse colon. APPENDIX: Unremarkable. PERITONEUM: Some free fluid noted in the pelvis. Increased size of mesenteric 
masses. Representative examples: 7.8 x 4.3 cm tumor anterior peritoneum series 3 
image 84. No pelvic tumor 7.5 x 4.8 cm series 3 image 100. RETROPERITONEUM:  
Right pelvic sidewall increased in size. Large right lateral lymph node 
measuring 3.1 x 2.0 cm not significantly changed. REPRODUCTIVE ORGANS: The visualized URINARY BLADDER: No mass or calculus. BONES: No destructive bone lesion. ADDITIONAL COMMENTS: N/A Impression IMPRESSION: 
1. There is some variable response to therapy with several lesions decreased in 
size. However a majority of the lesions have increased in size consistent with 
progression of disease with involvement of the mediastinum, liver masses, and 
peritoneal carcinomatosis. 2.  Increase right-sided hydronephrosis. Karson Rojas CT 10/2019: 
FINDINGS: 
THYROID: Visualized gland is unremarkable. MEDIASTINUM: Upper left paratracheal node measures 2.2 x 3.4 cm (2, 8), 
previously 2.3 x 3.5 cm. Superior prevascular node measures 2.9 x 3.0 cm, 
previously 3.5 x 3.8 cm. Poorly delineated subcarinal node measures about 3.4 x 
4.8 cm, previously 3.6 x 4.8 cm. Precardiac nodes measure 1.9 x 2.5 cm and 1.7 x 
2.4 cm (2, 39), previously 2.1 x 2.7 cm and 2.0 x 2.0 cm. SWATI: No mass or lymphadenopathy. THORACIC AORTA: No dissection or aneurysm. MAIN PULMONARY ARTERY: Normal in caliber. TRACHEA/BRONCHI: Patent. ESOPHAGUS: No wall thickening or dilatation. HEART: Normal in size. PLEURA: No effusion or pneumothorax. LUNGS: No nodule, mass, or airspace disease. LIVER: Multiple hypodense lesions suspicious for metastases, measuring up to 2.5 
x 4.0 cm in segment 2 (2, 43) series, previously 2.8 x 3.7 cm, 3.2 x 4.6 cm in 
segment 3 (2, 55), previously 3.1 x 4.5 cm, and partially exophytic from the 
posterior inferior right lobe segment 6 measuring 2.5 x 3.5 cm, previously 2.8 x 
3.4 cm. GALLBLADDER: Unremarkable. SPLEEN: Central 1.9 x 3.1 cm hypodensity (2, 47), previously 1.9 x 2.3 cm. Otherwise unremarkable. PANCREAS: Inseparable from bulky central abdominal adenopathy with no identified 
ductal dilation. ADRENALS: Unremarkable. KIDNEYS: Right hydronephrosis has improved status post right nephroureteral 
stent placement. Kidneys otherwise appear unremarkable. STOMACH: Unremarkable. SMALL BOWEL: No dilatation or wall thickening. COLON: No dilation or wall thickening. APPENDIX: Unremarkable. PERITONEUM: Extensive peritoneal implants with representative largest lesions 
measuring 4.4 x 6.1 cm and the left adnexal region (2, 98), previously 4.3 x 5.7 
cm, 3.8 x 5.9 cm in the anterior midline (2, 81), previously 3.4 x 5.4 cm, and 
2.8 x 4.7 cm in the right abdomen (2, 78), previously 3.2 x 4.7 cm. RETROPERITONEUM: Extensive rocio hepatis, retroperitoneal, and pelvic sidewall adenopathy. Conglomerate nkechi mass in the rocio hepatis region measures 5.7 x 
11.1 cm (2, 58) is, previously 6.3 x 12.2 cm. Aortocaval node measures 3.7 x 5.3 
cm (2, 77), previously 4.5 x 5.4 cm. Right pelvic sidewall node measures 2.9 x 
4.2 cm (2, 103), previously 3.4 x 4.2 cm. Left external iliac node measures 3.0 
x 4.8 cm (2, 101), previously 3.3 x 4.8 cm. REPRODUCTIVE ORGANS: Uterus and ovaries are surgically absent. URINARY BLADDER: No mass or calculus. Distal end of right nephroureteral stent 
positioned within bladder lumen. BONES: Degenerative spine change. No acute fracture or aggressive lesion. ADDITIONAL COMMENTS: Mass centered at the right sternocostal junction measures 5.3 x 5.4 cm (2, 21), previously 4.8 x 5.7 cm. Post surgical changes in the 
anterior abdominal wall. Left Port-A-Cath in place. Bilateral inguinal 
adenopathy measuring 2.1 x 3.2 cm on the right (2, 115), previously 2.3 x 3.7 cm 
and 2.7 x 2.8 cm on the left (2, 108), previously 2.9 x 3.1 cm. . 
  
IMPRESSION: Overall response to disease with either decrease in size or no 
significant change in extensive nkechi metastases, hepatic metastases, right 
sternal metastasis, and peritoneal carcinomatosis as above. No new metastasis or 
progressive metastasis identified. Status post right nephroureteral stent with 
interval resolution of right hydronephrosis. SUBJECTIVE 12/17/2020: Presents to [facility], feeling very well. UTI last visit, completing abx today. Dysyria improved, still spotting with void. She did have VV with her nephrogist and they are happy with her status. She had cystoscopy with stent xchange in October with recent \"manipulation. \"  She remains on eliquis for persistent RLE DVT. Swelling/discomfort has subsided. No CP/SOB. Remains fatigued but continues to exercise by riding stationary bike daily. She denies any abd/chest/back pain currently. She ambulates at home with rollator or cane PRN. Goes up/down stairs slowly and unassisted currently. No weakness/light headedness. No falls. No N/V. Appetite stable. She drinks 4 bottles water/day with gatorade. +BM daily to every other day with miralax. Takes tramadol nightly for generalized discomforts. Two adult sons at home who cook for her. She is also fostering two boys 15 and 12. The older is moving out likely later this year to reunite with his mother. She is contemplating adopting the younger. Confirms if she passes, her son Satish will adopt him. She has discussed this with the adoption agency. She has chronic pedal/hand numbness/tingling due to taxol and DM. She wakes from sleep occasionally d/t this but resumes sleep easily. Follows with podiatrist. Denies any change in depression, continues medication. ROS 10 pt negative except as per above PMH: 
Past Medical History:  
Diagnosis Date  Adverse effect of anesthesia   
 sleep apnea cpap machine useage  Anemia  Arthritis DDD low back and knees  Asthma   
 uses albuterol prn only; none x 6 mos. Never hospitalized  BRCA negative 1/2013  Calculus of kidney  Chronic kidney disease   
 kidney stones  Chronic pain   
 knee pain bilat  CINV (chemotherapy-induced nausea and vomiting) 8/21/2014  Decreased hearing, right PATIENT STATES THIS IS DUE TO CHEMOTHERAPY  Diabetes ([facility] [address] Age 46 IDDM  Environmental allergies  Fibromyalgia  GERD (gastroesophageal reflux disease)   
 controlled with med  Hx of pulmonary embolus during pregnancy 2015  Hydronephrosis due to obstruction of ureter 3/18/2019  Hypertension  Ill-defined condition Sepsis  -  SOURCE PORT  
 Ill-defined condition 2016  
 chemotherapy  Mass of chest wall 2018  Morbid obesity ([employer] [place])  Murmur, heart  Other and unspecified hyperlipidemia  Ovarian cancer ([employer] [place]) 2012, 2014  
 serous, high grade, stage IIIc. s/p chemotx (has port)  Psychiatric disorder Depression/Anxiety  Rectal bleeding  Thromboembolus ([employer])  POST PARTUM; resolved- PELVIS  Thyroid disease HYPOTHYROID  Unspecified sleep apnea CPAP, Dr. Gabo Shaver PSH: 
Past Surgical History:  
Procedure Laterality Date  BREAST SURGERY PROCEDURE UNLISTED Lymph node in left breast 2017 [place]  
  X2  
 HX HYSTERECTOMY  2012 ROULA/BSO, tumor debulking, omentectomy for ovarian cancer  HX ORTHOPAEDIC Right   
 ankle-FX, R  
 HX ORTHOPAEDIC Right   
 FX R ARM  
 HX UROLOGICAL Right  STENT FOR KIDNEY STONES  
 HX VASCULAR ACCESS  2015  
 right chest- port placed  HX VASCULAR ACCESS Left 2017 [facility] CATH  
 
SOC: 
Social History Socioeconomic History  Marital status:  Spouse name: Not on file  Number of children: Not on file  Years of education: Not on file  Highest education level: Not on file Occupational History  Not on file Social Needs  Financial resource strain: Not on file  Food insecurity Worry: Not on file Inability: Not on file  Transportation needs Medical: Not on file Non-medical: Not on file Tobacco Use  Smoking status: Never Smoker  Smokeless tobacco: Never Used Substance and Sexual Activity  Alcohol use: Yes Comment: 1 drink per month  Drug use: No  
 Sexual activity: Yes  
 Lifestyle  Physical activity Days per week: Not on file Minutes per session: Not on file  Stress: Not on file Relationships  Social connections Talks on phone: Not on file Gets together: Not on file Attends Buddhism service: Not on file Active member of club or organization: Not on file Attends meetings of clubs or organizations: Not on file Relationship status: Not on file  Intimate partner violence Fear of current or ex partner: Not on file Emotionally abused: Not on file Physically abused: Not on file Forced sexual activity: Not on file Other Topics Concern  Not on file Social History Narrative Lives in [facility] alone.  passed away in 5/16 of a heart attack. Has 2 sons. Disability. 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 Enjoys swimming and going to the gym. Family History Family History Problem Relation Age of Onset  Hypertension Mother Rashmi Hansen Arthritis-osteo Mother  Hypertension Father  Arthritis-osteo Father  Cancer Father PROSTATE  COPD Father  Hypertension Brother  Elevated Lipids Brother  Arthritis-osteo Brother  Hypertension Brother  Elevated Lipids Brother  Obesity Brother  Cancer Brother PROSTATE  Anesth Problems Son DELAYED AWAKENING  
 Diabetes Maternal Grandmother  Anesth Problems Paternal Grandmother PATIENT STATES GRANDMOTHER'S HEART STOPPED DURING SURGERY Medications: (reviewed) Current Outpatient Medications on File Prior to Encounter Medication Sig Dispense Refill  traMADoL (ULTRAM) 50 mg tablet Take 1 Tab by mouth every eight (8) hours as needed for Pain for up to 30 days. Max Daily Amount: 150 mg.  Indications: neuropathic pain, pain 30 Tab 0  
  amoxicillin (AMOXIL) 500 mg capsule Take 1 Cap by mouth three (3) times daily for 7 days. Indications: an infection of the genitals or urinary tract 21 Cap 0  
 apixaban (Eliquis) 5 mg tablet Take 1 Tab by mouth two (2) times a day for 30 days. 60 Tab 2  
 omega-3 acid ethyl esters (LOVAZA) 1 gram capsule TAKE 1 CAPSULE BY MOUTH TWICE A  Cap 2  
 rosuvastatin (CRESTOR) 10 mg tablet Take 10 mg by mouth nightly.  doxazosin (CARDURA) 1 mg tablet Take  by mouth daily.  doxazosin (CARDURA) 1 mg tablet Take 1 mg by mouth daily.  SULFAMETHOXAZOLE-TRIMETHOPRIM PO Take  by mouth two (2) times a day. 7 days  dexAMETHasone (DECADRON) 4 mg tablet Take 2 tablets with breakfast the day before chemo and for 2 days after chemo 36 Tab 2  
 lidocaine-prilocaine (EMLA) topical cream Apply small amount over port area and cover with band aid one hour before chemo 30 g 3  
 ondansetron (ZOFRAN ODT) 4 mg disintegrating tablet Take 1 Tab by mouth every eight (8) hours as needed for Nausea. Indications: nausea and vomiting caused by cancer drugs 30 Tab 2  predniSONE (DELTASONE) 50 mg tablet Take 1 tablet 13 hours prior to scan, take 1 tablet 7 hours prior to scan and take 1 tablet one hour prior to scan 3 Tab 0  
 fluticasone propionate (FLONASE) 50 mcg/actuation nasal spray 2 SPRAYS IN BOTH NOSTRILS DAILY 1 Bottle 0  
 cycloSPORINE (RESTASIS) 0.05 % dpet  albuterol (PROVENTIL VENTOLIN) 2.5 mg /3 mL (0.083 %) nebu Take 2.5 mg by inhalation.  dextroamphetamine-amphetamine (ADDERALL) 20 mg tablet TAKE 1 TABLET BY MOUTH EVERY DAY  0  
 rosuvastatin (CRESTOR) 10 mg tablet TAKE 1 TABLET BY MOUTH EVERY DAY  0  
 telmisartan (MICARDIS) 20 mg tablet TAKE 1 TABLET BY MOUTH EVERY DAY 90 Tab 3  
 atorvastatin (LIPITOR) 20 mg tablet  BD ULTRA-FINE SHORT PEN NEEDLE 31 gauge x 5/16\" ndle USE TO INJECT INSULIN 5 TIMES DAILY 200 Pen Needle 11  
  insulin aspart U-100 (NOVOLOG FLEXPEN U-100 INSULIN) 100 unit/mL (3 mL) inpn INJECT 20 UNITS BEFORE EACH MEAL + 4 UNITS FOR EVERY 50 MG/DL ABOVE 150 MG/DL.  UNITS PER DAY 90 Adjustable Dose Pre-filled Pen Syringe 3  
 LEVEMIR FLEXTOUCH U-100 INSULN 100 unit/mL (3 mL) inpn INJECT 30 UNITS IN AM AND 50 UNITS AT NIGHT. INCREASE AS DIRECTED TO  UNITS/DAY. 90 Adjustable Dose Pre-filled Pen Syringe 3  DEXILANT 60 mg CpDB capsule (delayed release) TAKE 1 CAPSULE BY MOUTH EVERY DAY 90 Cap 3  
 LINZESS 145 mcg cap capsule TAKE 1 CAPSULE BY MOUTH EVERY DAYY 30 Cap 0  
 levothyroxine (SYNTHROID) 150 mcg tablet TAKE 1 TABLET BY MOUTH EVERY DAY BEFORE BREAKFAST 90 Tab 0  
 EPINEPHrine (EPIPEN) 0.3 mg/0.3 mL injection INJECT 0.3 ML BY INTRAMUSCULAR ROUTE ONCE AS NEEDED FOR UP TO 1 DOSE.  1  
 glucose blood VI test strips (TRUE METRIX GLUCOSE TEST STRIP) strip by Does Not Apply route See Admin Instructions. 30 Strip 5  
 lancets misc by Does Not Apply route daily. True Metrix lancets- test blood sugar once per day 30 Each 5  Blood-Glucose Meter monitoring kit Check up to tid, as directed 1 Kit 0  
 azelastine (ASTELIN) 137 mcg (0.1 %) nasal spray 1 Baxter by Both Nostrils route daily as needed. Use in each nostril as directed  insulin detemir U-100 (LEVEMIR) 100 unit/mL injection 20 Units by SubCUTAneous route daily (with lunch).  montelukast (SINGULAIR) 10 mg tablet Take 10 mg by mouth nightly.  ascorbic acid, vitamin C, (VITAMIN C) 1,000 mg tablet Take 1,000 mg by mouth daily.  albuterol (PROVENTIL VENTOLIN) 2.5 mg /3 mL (0.083 %) nebulizer solution 2.5 mg by Nebulization route every four (4) hours as needed for Wheezing.  Liraglutide (VICTOZA) 0.6 mg/0.1 mL (18 mg/3 mL) pnij 1.8 mg by SubCUTAneous route daily (with lunch). 3 Pen 3  
 SYMBICORT 160-4.5 mcg/actuation HFAA Take 2 Puffs by inhalation two (2) times daily as needed.  2 Inhaler 3  
  clonazePAM (KLONOPIN) 0.5 mg tablet Take 0.5 mg by mouth nightly as needed.  sertraline (ZOLOFT) 50 mg tablet TAKE 1 TABLET BY MOUTH as needed  1  cholecalciferol, VITAMIN D3, (VITAMIN D3) 5,000 unit tab tablet Take 5,000 Units by mouth daily.  furosemide (LASIX) 40 mg tablet Take 40 mg by mouth every other day. 3 No current facility-administered medications on file prior to encounter. Allergies: (reviewed) Allergies Allergen Reactions  Carboplatin Other (comments) Patient developed shortness of breath, felt faint, coughing, skin flushed and \"itchy\". This occurred on the patients 8th treatment.  Iodinated Contrast Media Rash 13 hr pre-medication prior to IV contrast.  
Patient has done well with 1 hr pre-medication of (Solu-Medrol) 40mg &  (Benadryl) 50mg.  Lipitor [Atorvastatin] Myalgia  Shellfish Containing Products Hives  Tape [Adhesive] Itching and Other (comments) \"op site-- clear,thin tape\"--caused blister  Tomato Hives  Iodine And Iodide Containing Products Rash  Olmesartan Other (comments)  Losartan Other (comments)  
  headaches  Percocet [Oxycodone-Acetaminophen] Other (comments) Fever; however, current home med OBJECTIVE Physical Exam 
Visit Vitals BP (!) 142/83 Pulse 76 Temp 97.1 °F (36.2 °C) Resp 18 Ht 5' 8\" (1.727 m) Wt 276 lb (125.2 kg) LMP 09/07/2011 BMI 41.97 kg/m² GENERAL BRIAN: Conversant, pleasant, alert, oriented. NAD HEENT: Oropharynx clear. Sclera anicteric. No JVD. No adenopathy RESPIRATORY: Lung CTA, No rales/rhonchi. Adequate respiratory effort. CARDIOVASC: Reg rate/rhythm, stable murmur low grade. No visible chest lesion. GASTROINT: soft, non-tender, without masses or organomegaly, no fluid wave. Habitus limits exam.  
EXTREMITIES: bilat 1+ edema/girth nonpitting < RLE. Lightly tender R ankle w/o erythema. Foot warm, mobile, sensate. ALCON SURVEY: Negative NEURO: Grossly intact, no acute deficit. Oriented. Not depressed. Not agitated. Wt Readings from Last 3 Encounters:  
12/17/20 276 lb (125.2 kg) 12/10/20 276 lb (125.2 kg) 12/03/20 268 lb 3.2 oz (121.7 kg) DATE REVIEW as available: 
Lab Results Component Value Date/Time WBC 3.9 12/17/2020 08:17 AM  
 Hemoglobin (POC) 10.5 (L) 03/20/2017 12:15 PM  
 HGB 6.3 (L) 12/17/2020 08:17 AM  
 Hematocrit (POC) 31 (L) 03/20/2017 12:15 PM  
 HCT 20.7 (L) 12/17/2020 08:17 AM  
 PLATELET 923 90/29/4998 08:17 AM  
 MCV 94.5 12/17/2020 08:17 AM  
 
Lab Results Component Value Date/Time  
 ABS. NEUTROPHILS 2.2 12/17/2020 08:17 AM  
 
Lab Results Component Value Date/Time Sodium 138 12/17/2020 08:17 AM  
 Potassium 4.1 12/17/2020 08:17 AM  
 Chloride 109 (H) 12/17/2020 08:17 AM  
 CO2 23 12/17/2020 08:17 AM  
 Anion gap 6 12/17/2020 08:17 AM  
 Glucose 167 (H) 12/17/2020 08:17 AM  
 BUN 26 (H) 12/17/2020 08:17 AM  
 Creatinine 1.25 (H) 12/17/2020 08:17 AM  
 BUN/Creatinine ratio 21 (H) 12/17/2020 08:17 AM  
 GFR est AA 53 (L) 12/17/2020 08:17 AM  
 GFR est non-AA 44 (L) 12/17/2020 08:17 AM  
 Calcium 8.3 (L) 12/17/2020 08:17 AM  
 Bilirubin, total 0.4 12/03/2020 08:10 AM  
 Alk. phosphatase 67 12/03/2020 08:10 AM  
 Protein, total 7.6 12/03/2020 08:10 AM  
 Albumin 2.9 (L) 12/03/2020 08:10 AM  
 Globulin 4.7 (H) 12/03/2020 08:10 AM  
 A-G Ratio 0.6 (L) 12/03/2020 08:10 AM  
 ALT (SGPT) 25 12/03/2020 08:10 AM  
 
 
CA-125 Date Value Ref Range Status 12/03/2020 48 (H) 1.5 - 35.0 U/mL Final  
  Comment:  
  ** Note new reference range and method ** Results may vary depending on . Method used is [employer] 
  
11/05/2020 52 (H) 1.5 - 35.0 U/mL Final  
  Comment:  
  ** Note new reference range and method ** Results may vary depending on . Method used is [employer] 10/08/2020 116 (H) 1.5 - 35.0 U/mL Final  
  Comment: ** Note new reference range and method ** Results may vary depending on . Method used is [employer] 
  
09/03/2020 187 (H) 1.5 - 35.0 U/mL Final  
  Comment:  
  ** Note new reference range and method ** Results may vary depending on . Method used is [employer] 
  
06/11/2020 268 (H) 1.5 - 35.0 U/mL Final  
  Comment:  
  ** Note new reference range and method ** Results may vary depending on . Method used is [employer] ECHO 1/2020 · Normal cavity size and systolic function (ejection fraction normal). Mild concentric hypertrophy. Estimated left ventricular ejection fraction is 55 - 60%. Visually measured ejection fraction. No regional wall motion abnormality noted. · Mildly dilated left atrium. · Mild aortic valve sclerosis. · Trace mitral valve regurgitation is present. IMPRESSION AND PLAN: 
62 y.o. with diffusely metastatic ovarian cancer. Continues on palliative chemo, retrial taxol C5 Taxol, ongoing. Responsive chemically. CT next month. CKD stage 2-3 - stable. Continue home hydration. Right hydronephrosis - stent in place. Exchanged October 2020 with recent \"manipulation\"  With ongoing hematuria, asked that she see her urologist 
Liver mets -  asx Pain d/t disease/neuropathy -tramadol RLE DVT acute/chronic - stable on eliquis. If Issues persist or PE present, consider lovenox/filter. Consider risk/benefit of AOC with persistent anemia. Anemia - persistent d/t multiple causes with urologic losses. VSS and relatively asx. Plan for blood transfusion next visit with labs SOB/debility - Improved. Hx Asthma, chronic due to condition, anemia. IDDM2/SHABANA/depression - controlled overall. Continue CPAP, though sleep study this week CIPN/diabetic polyneuropathy - not in pain, monitor. Discussed home safety. Wt loss, prot smiley malnutrition - supplements/alimentation as best as able. Psychosocial - she has some fear about the future, currently very pleased with progress. She is well supported, isaac remains very important. Monitor depressive sx mgmt. Discussed home life and plans for the future. Plans for adopting her foster child with support of her son Satish. Carley Samuels PA-C 
12/17/2020

## 2020-12-17 NOTE — PROGRESS NOTES
[facility] Chemotherapy Progress Note Date: 2020 Name: Amarjit Lou MRN: [id number] : 1960 
 
 
0810 Pt admit to [facility] for Taxol ambulatory in stable condition. Assessment completed. No new concerns voiced. Port with positive blood return. Patient denies SOB, fever, cough, general not feeling well. Patient denies recent exposure to someone who has tested positive for COVID-19. Patient denies having contact with anyone who has a pending COVID test. 
 
 
Patients hgb was 6.3 Dax TENA was made aware of. Patient stated she feels good. No blood for today and if patients feel bad has symptoms was instructed to call them to get her in for a blood transfusion next week and if not they will plan on giving possible blood next chem session on  Chemotherapy Flowsheet 2020 Cycle [id number] Date 2020 Drug / Regimen Taxol Dosage -  
Pre Hydration -  
Pre Meds given Notes given Ms. Moore's vitals were reviewed. Patient Vitals for the past 12 hrs: 
 Temp Pulse Resp BP  
20 1315  77  (!) 140/90  
20 0809 97.1 °F (36.2 °C) 76 18 (!) 142/83 Lab results were obtained and reviewed. Recent Results (from the past 12 hour(s)) CBC WITH AUTOMATED DIFF Collection Time: 20  8:17 AM  
Result Value Ref Range WBC 3.9 3.6 - 11.0 K/uL  
 RBC 2.19 (L) 3.80 - 5.20 M/uL HGB 6.3 (L) 11.5 - 16.0 g/dL HCT 20.7 (L) 35.0 - 47.0 % MCV 94.5 80.0 - 99.0 FL  
 MCH 28.8 26.0 - 34.0 PG  
 MCHC 30.4 30.0 - 36.5 g/dL RDW 20.4 (H) 11.5 - 14.5 % PLATELET 053 828 - 831 K/uL MPV 10.4 8.9 - 12.9 FL  
 NRBC 1.5 (H) 0  WBC ABSOLUTE NRBC 0.06 (H) 0.00 - 0.01 K/uL NEUTROPHILS 57 32 - 75 % LYMPHOCYTES 24 12 - 49 % MONOCYTES 12 5 - 13 % EOSINOPHILS 4 0 - 7 % BASOPHILS 1 0 - 1 % IMMATURE GRANULOCYTES 2 (H) 0.0 - 0.5 % ABS. NEUTROPHILS 2.2 1.8 - 8.0 K/UL  
 ABS. LYMPHOCYTES 0.9 0.8 - 3.5 K/UL ABS. MONOCYTES 0.5 0.0 - 1.0 K/UL  
 ABS. EOSINOPHILS 0.2 0.0 - 0.4 K/UL  
 ABS. BASOPHILS 0.0 0.0 - 0.1 K/UL  
 ABS. IMM. GRANS. 0.1 (H) 0.00 - 0.04 K/UL  
 DF AUTOMATED    
 RBC COMMENTS ANISOCYTOSIS 2+ 
    
 RBC COMMENTS OVALOCYTES 1+ RBC COMMENTS TEARDROP CELLS 
1+ METABOLIC PANEL, BASIC Collection Time: 12/17/20  8:17 AM  
Result Value Ref Range Sodium 138 136 - 145 mmol/L Potassium 4.1 3.5 - 5.1 mmol/L Chloride 109 (H) 97 - 108 mmol/L  
 CO2 23 21 - 32 mmol/L Anion gap 6 5 - 15 mmol/L Glucose 167 (H) 65 - 100 mg/dL BUN 26 (H) 6 - 20 MG/DL Creatinine 1.25 (H) 0.55 - 1.02 MG/DL  
 BUN/Creatinine ratio 21 (H) 12 - 20 GFR est AA 53 (L) >60 ml/min/1.73m2 GFR est non-AA 44 (L) >60 ml/min/1.73m2 Calcium 8.3 (L) 8.5 - 10.1 MG/DL Pre-medications  were administered as ordered and chemotherapy was initiated. Medications Administered 0.9% sodium chloride infusion Admin Date 
12/17/2020 Action New Bag Dose 25 mL/hr Rate 25 mL/hr Route IntraVENous Administered By 
Cony Morejon RN  
  
  
 dexamethasone (DECADRON) 12 mg in 0.9% sodium chloride 50 mL, overfill volume 5 mL IVPB Admin Date 
12/17/2020 Action Given Dose 
12 mg Rate 232 mL/hr Route IntraVENous Administered By 
Cony Morejon RN  
  
  
 diphenhydrAMINE (BENADRYL) injection 50 mg   
 Admin Date 
12/17/2020 Action Given Dose 50 mg Route IntraVENous Administered By 
Cony Morejon RN  
  
  
 famotidine (PF) (PEPCID) 20 mg in 0.9% sodium chloride 10 mL injection Admin Date 
12/17/2020 Action Given Dose 
20 mg Route IntraVENous Administered By 
Cony Morejon RN  
  
  
 PACLitaxeL (TAXOL) 197 mg in 0.9% sodium chloride 250 mL, overfill volume 25 mL chemo infusion Admin Date 
12/17/2020 Action New Bag Dose 
197 mg Rate 
307.8 mL/hr Route IntraVENous Administered By 
Cony Morejon RN  
  
  
 palonosetron HCl (ALOXI) injection 0.25 mg   
 Admin Date 
12/17/2020 Action Given Dose 0.25 mg Route IntraVENous Administered By 
Concepción Orellana, NIKIA  
  
  
  
 
 
4576 Pt tolerated treatment well. POrt maintained positive blood return throughout treatment. Flushed, heparinized and de-accessed per protocol. D/c home ambulatory in no distress. Future Appointments Date Time Provider Gabriel Phillip 12/22/2020  2:15 PM MD ALMA MarieeO BS AMB  
12/30/2020  8:00 AM B1 [facility] [address]  
1/6/2021  8:00 AM B1 [facility] [address] [facility]  
1/13/2021  8:00 AM B1 [facility] [address] Samantha Balbuena RN December 17, 2020

## 2020-12-21 NOTE — PROGRESS NOTES
[facility], [address] [facility], [address] 
P [phone]  F [phone] Office Visit Patient ID: 
Name: Dilan Maddox MRN: [id number] : 1960/60 y.o. Visit date: 2020 LEONID Yeung is a 62 y.o.  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 The patient's previous treatment procedures include primary Taxol/carbo with retreatment in  and Doxil/Avastin, topotecan, gemzar and now single agent weekly Taxol initiated 18 following progression with chest wall involvement. In 2018, patient did not follow up and self-discontinued her treatment regimen. She presented to the ER on 18 with SOB. Negative workup for PE or MI. Admitted to Hospitalist service from -18. Managed for acute asthma exacerbation, acute FELICIA, and hyperkalemia. The patient was initiated on Nury Bonnet on 19. Underwent repeat CT C/A/P on 19 for purposes of following response to treatment. Patient was tolerating Nury Bonnet well for the first month, but then had a rise in her creatinine. However, she was also found to have hydronephrosis. Underwent right ureteral stent placement on 3/18/19. Restarted Zejula afterwards, although with continued rise in creatinine to >2. Held Nury Bonnet again and restarted Zejula at 100mg/daily on 19. Presents today for follow-up and consideration of continuation of Nury Bonnet. Completed 2700 cGy of radiation to her chest wall mass on 19. Tolerating treatment well. Held for one week in 10/2019 for blood transfusion secondary to anemia. Normal iron and B12 labs. Her Ca-125 has bounced around, but has been steadily declining recently. CT C/A/P on 10/2/19 with improvement in her disease. CT C/A/P 2020 demonstrates progression. Rising Ca-125. On 2020 initiated on weekly paclitaxel. Presents today for hospital follow-up after recent admission last week to [facility] with right lower extremity DVT. She reports undergoing manometry testing for neuropathy of her right leg, and then she reports swelling in the entire leg. She went to the ER at that time and was diagnosed with a right LE DVT. She was initiated on Lovenox and transitioned to Eliquis. She is scheduled to start cycle 5 of treatment next week. She missed the last weekly dose of cycle 3. She has tolerated treatment well thus far, and denies complaints except for pain and swelling in her right leg. She denies pain in her chest wall or abdomen. She does have some constipation on the day of treatment, which is relieved with Miralax. Ureteral stent exchanged at the end of October. She has not required any antibiotics recently. Denies change in appetite or bowel habits. Denies vaginal bleeding, hematuria, hematochezia, constipation, diarrhea, nausea, vomiting, CP, SOB, fevers, or chills. 
   
OncTx History: 
9/21/12: MARAH/BSO/Cytoreductive surgery on 9/21/12 noted carcinomatosis/omental caking. 10/2012-2/2013: 6 cycles Carbo/Taxol 8/2014-11/2014: 6 Cycles carbo/Taxol 3/2015-5/13/15: 3 cycles Avastin/Doxil until progression 7/2015-8/2015 Weekly Topotecan  
12/2015-3/2016: Weekly Topotecan 2/2017-9/2017Concha Clifford D1/D8 Q28 days for 6 cycles 4/2018: CT core needle bx chest wall mass: Metastatic high-grade serous carcinoma (see comment) General Categorization Positive for malignancy. 5/8/2018-Present: Taxol  W4,0,21; S/P  Cycle #4  8/14/2018. Patient discontinued treatment secondary to side effects and fatigue. 1/23/2019 - Initiated Rosa Maria Burgess. Held do to rising creatinine. However, she was also found to have hydronephrosis. Underwent right ureteral stent placement on 3/18/19. CT C/A/P 6/24/2020 with mixed response, but overall progression. Rising Ca-125 now 268. 8/6/2020: Initiated weekly paclitaxel 80mg/m2 days , 8, and 15 of 28-day cycle. Imaging Review:  
CT C/A/P 6/24/2020: 
FINDINGS:  
THYROID: No nodule. MEDIASTINUM: Left paratracheal adenopathy now measures 3.7 x 2.0 cm increased in 
size. Mass in AP window which extends superiorly into the left infraclavicular 
region and inferiorly into the left hilum is also increase in size. When 
measured in a similar fashion this measures 3.5 x 2.4 previously 3.0 x 2.9 cm. Right parasternal mass is decreased in size measuring 3.4 x 3.4 cm. Subcarinal 
adenopathy measures 2.8 x 2.3 cm increased in size. There is increased size of 
the nodular component adjacent to the xiphoid now measuring 2.6 x 3.3 cm. SWATI: Left hilar lymph node measures 3.2 x 2.4 cm previously 2.5 x 1.8 cm. THORACIC AORTA: No dissection or aneurysm. MAIN PULMONARY ARTERY: Normal in caliber. TRACHEA/BRONCHI: Patent. ESOPHAGUS: No wall thickening or dilatation. HEART: Normal in size. PLEURA: No effusion or pneumothorax. LUNGS: No nodule, mass, or airspace disease. LIVER: Multiple masses are noted and better visualized on current study 
secondary to the addition of intravenous contrast. Comparison lesions measure 
previously, there is a 3.5 x 2.8 cm mass in the right hepatic lobe series 3 
image 58 slightly increased in size. There is a 3.6 x 2.9 cm mass in the left 
hepatic lobe slightly decreased in size series 3 image 56. Left hepatic lobe 
mass series 3 image 45 measuring 4.0 x 2.8 cm slightly increased in size. There 
is extensive tumor infiltration in the liver hilum with narrowing of the main 
portal vein as well as the intrahepatic portal veins. The common hepatic artery 
is not well visualized and likely occluded with some collateral flow noted in 
the liver hilum. GALLBLADDER: Not visualized SPLEEN: Mass in the splenic hilum increased in size measuring 7.4 x 4.3 cm with 
invasion into the splenic hilum. PANCREAS: The pancreas is encased by tumor. ADRENALS: There is likely involvement of the left adrenal gland tumor. KIDNEYS: Right nephroureteral stent with mild to moderate right-sided 
hydronephrosis. STOMACH: Tumor encasing the gastric antrum. SMALL BOWEL: No dilatation or wall thickening. COLON: Tumor abutting the tumor encasing the rectosigmoid junction. Tumor 
abutting the transverse colon. APPENDIX: Unremarkable. PERITONEUM: Some free fluid noted in the pelvis. Increased size of mesenteric 
masses. Representative examples: 7.8 x 4.3 cm tumor anterior peritoneum series 3 
image 84. No pelvic tumor 7.5 x 4.8 cm series 3 image 100. RETROPERITONEUM:  
Right pelvic sidewall increased in size. Large right lateral lymph node 
measuring 3.1 x 2.0 cm not significantly changed. REPRODUCTIVE ORGANS: The visualized URINARY BLADDER: No mass or calculus. BONES: No destructive bone lesion. ADDITIONAL COMMENTS: N/A IMPRESSION IMPRESSION: 
1. There is some variable response to therapy with several lesions decreased in 
size. However a majority of the lesions have increased in size consistent with 
progression of disease with involvement of the mediastinum, liver masses, and 
peritoneal carcinomatosis. 2.  Increase right-sided hydronephrosis. [facility] CT C/A/P 10/2/19:  
FINDINGS: 
THYROID: Visualized gland is unremarkable. MEDIASTINUM: Upper left paratracheal node measures 2.2 x 3.4 cm (2, 8), 
previously 2.3 x 3.5 cm. Superior prevascular node measures 2.9 x 3.0 cm, 
previously 3.5 x 3.8 cm. Poorly delineated subcarinal node measures about 3.4 x 
4.8 cm, previously 3.6 x 4.8 cm. Precardiac nodes measure 1.9 x 2.5 cm and 1.7 x 
2.4 cm (2, 39), previously 2.1 x 2.7 cm and 2.0 x 2.0 cm. SWATI: No mass or lymphadenopathy. THORACIC AORTA: No dissection or aneurysm. MAIN PULMONARY ARTERY: Normal in caliber. TRACHEA/BRONCHI: Patent. ESOPHAGUS: No wall thickening or dilatation. HEART: Normal in size. PLEURA: No effusion or pneumothorax. LUNGS: No nodule, mass, or airspace disease. LIVER: Multiple hypodense lesions suspicious for metastases, measuring up to 2.5 
x 4.0 cm in segment 2 (2, 43) series, previously 2.8 x 3.7 cm, 3.2 x 4.6 cm in 
segment 3 (2, 55), previously 3.1 x 4.5 cm, and partially exophytic from the 
posterior inferior right lobe segment 6 measuring 2.5 x 3.5 cm, previously 2.8 x 
3.4 cm. GALLBLADDER: Unremarkable. SPLEEN: Central 1.9 x 3.1 cm hypodensity (2, 47), previously 1.9 x 2.3 cm. Otherwise unremarkable. PANCREAS: Inseparable from bulky central abdominal adenopathy with no identified 
ductal dilation. ADRENALS: Unremarkable. KIDNEYS: Right hydronephrosis has improved status post right nephroureteral 
stent placement. Kidneys otherwise appear unremarkable. STOMACH: Unremarkable. SMALL BOWEL: No dilatation or wall thickening. COLON: No dilation or wall thickening. APPENDIX: Unremarkable. PERITONEUM: Extensive peritoneal implants with representative largest lesions 
measuring 4.4 x 6.1 cm and the left adnexal region (2, 98), previously 4.3 x 5.7 
cm, 3.8 x 5.9 cm in the anterior midline (2, 81), previously 3.4 x 5.4 cm, and 
2.8 x 4.7 cm in the right abdomen (2, 78), previously 3.2 x 4.7 cm. RETROPERITONEUM: Extensive rocio hepatis, retroperitoneal, and pelvic sidewall 
adenopathy. Conglomerate nkechi mass in the rocio hepatis region measures 5.7 x 
11.1 cm (2, 58) is, previously 6.3 x 12.2 cm. Aortocaval node measures 3.7 x 5.3 
cm (2, 77), previously 4.5 x 5.4 cm. Right pelvic sidewall node measures 2.9 x 
4.2 cm (2, 103), previously 3.4 x 4.2 cm. Left external iliac node measures 3.0 
x 4.8 cm (2, 101), previously 3.3 x 4.8 cm. REPRODUCTIVE ORGANS: Uterus and ovaries are surgically absent. URINARY BLADDER: No mass or calculus. Distal end of right nephroureteral stent 
positioned within bladder lumen. BONES: Degenerative spine change. No acute fracture or aggressive lesion. ADDITIONAL COMMENTS: Mass centered at the right sternocostal junction measures 5.3 x 5.4 cm (2, 21), previously 4.8 x 5.7 cm. Post surgical changes in the 
anterior abdominal wall. Left Port-A-Cath in place. Bilateral inguinal 
adenopathy measuring 2.1 x 3.2 cm on the right (2, 115), previously 2.3 x 3.7 cm 
and 2.7 x 2.8 cm on the left (2, 108), previously 2.9 x 3.1 cm. Ronold Kanner IMPRESSION IMPRESSION: Overall response to disease with either decrease in size or no 
significant change in extensive nkechi metastases, hepatic metastases, right 
sternal metastasis, and peritoneal carcinomatosis as above. No new metastasis or 
progressive metastasis identified. Status post right nephroureteral stent with 
interval resolution of right hydronephrosis. CT C/A/P 2/5/19: 
FINDINGS:  
THYROID: No nodule. MEDIASTINUM: Mediastinal lymphadenopathy is unchanged. A reference superior 
mediastinal lymph node is stable measuring 3.3 cm. SWATI: No mass or lymphadenopathy. THORACIC AORTA: No dissection or aneurysm. MAIN PULMONARY ARTERY: Normal in caliber. TRACHEA/BRONCHI: Patent. ESOPHAGUS: No wall thickening or dilatation. HEART: Normal in size. PLEURA: No effusion or pneumothorax. LUNGS: No nodule, mass, or airspace disease. LIVER: Evaluation of the liver is limited by lack of intravenous contrast. There 
is a vague 4.4 cm hypodense lesion in the left hepatic lobe previously measuring 3.0 cm. Lesion in the right hepatic lobe now measures 3.3 cm, previously 
measuring 2.7 cm. GALLBLADDER: Unremarkable. SPLEEN: Central soft tissue abnormality has increased in size, now measuring 4.9 
cm, previously measuring 3.1 cm. PANCREAS: Not well evaluated given the massive lymphadenopathy and lack of 
intravenous contrast. 
ADRENALS: Unremarkable. KIDNEYS: There is no moderate right hydronephrosis. STOMACH: Unremarkable. SMALL BOWEL: No dilatation or wall thickening. COLON: No dilatation or wall thickening. APPENDIX: Not visualized. PERITONEUM: Bulky gastrohepatic, periceliac, portacaval, and mesenteric 
lymphadenopathy has increased. There is secondary invasion of the liver. Maximum 
dimension in the upper abdomen is currently 10.4 cm, previously 9.3 cm. Elemental soft tissue nodules have increased in size, with a reference 7.0 cm 
nodule in the left abdomen previously measuring 5.5 cm. Soft tissue nodule in 
the deep pelvis between the bladder and colon has increased in size as well. RETROPERITONEUM: No lymphadenopathy or aortic aneurysm. REPRODUCTIVE ORGANS: The uterus and ovaries are surgically absent. URINARY BLADDER: No mass or calculus. BONES: Soft tissue mass involving the right sternum has not changed. Degenerative changes are seen in the thoracic and lumbar spine. ADDITIONAL COMMENTS: N/A IMPRESSION IMPRESSION: 
Stable mediastinal lymphadenopathy. Progressive abdominal lymphadenopathy as 
described above. Liver lesions have increased in size compared to the prior 
exam. Stable right sternal mass. Moderate right hydroureteronephrosis is now 
noted which appears to be related to the lymphadenopathy. ROS Constitutional: no weight loss, fever, night sweats Respiratory: no cough, shortness of breath, or wheezing Cardiovascular: no chest pain or dyspnea on exertion. Reports chest wall mass smaller, no associated pain. Heme: No abnormal bleeding Gastrointestinal: no abdominal pain, change in bowel habits, or black or bloody stools Genito-Urinary: no dysuria, trouble voiding, or hematuria Musculoskeletal: + swelling in ankle - bilateral, mostly only at end of day Neurological: mild neuropathy Derm: pigmentation/induration medial left leg. Prior injury from fall. Psych: positive for depression - controlled PMH: 
Past Medical History:  
Diagnosis Date  Adverse effect of anesthesia sleep apnea cpap machine useage  Anemia  Arthritis DDD low back and knees  Asthma   
 uses albuterol prn only; none x 6 mos. Never hospitalized  BRCA negative 2013  Calculus of kidney  Chronic kidney disease   
 kidney stones  Chronic pain   
 knee pain bilat  CINV (chemotherapy-induced nausea and vomiting) 2014  Decreased hearing, right PATIENT STATES THIS IS DUE TO CHEMOTHERAPY  Diabetes ([address] Age 46 IDDM  Environmental allergies  Fibromyalgia  GERD (gastroesophageal reflux disease)   
 controlled with med  Hx of pulmonary embolus during pregnancy 2015  Hydronephrosis due to obstruction of ureter 3/18/2019  Hypertension  Ill-defined condition Sepsis  -  SOURCE PORT  
 Ill-defined condition 2016  
 chemotherapy  Mass of chest wall 2018  Morbid obesity ([address]  Murmur, heart  Other and unspecified hyperlipidemia  Ovarian cancer ([address] 2012, 2014  
 serous, high grade, stage IIIc. s/p chemotx (has port)  Psychiatric disorder Depression/Anxiety  Rectal bleeding  Thromboembolus ([address]  POST PARTUM; resolved- PELVIS  Thyroid disease HYPOTHYROID  Unspecified sleep apnea CPAP, Dr. Mai Foil PSH: 
Past Surgical History:  
Procedure Laterality Date  BREAST SURGERY PROCEDURE UNLISTED Lymph node in left breast 2017 [place]  
  X2  
 HX HYSTERECTOMY  2012 ROULA/BSO, tumor debulking, omentectomy for ovarian cancer  HX ORTHOPAEDIC Right   
 ankle-FX, R  
 HX ORTHOPAEDIC Right   
 FX R ARM  
 HX UROLOGICAL Right  STENT FOR KIDNEY STONES  
 HX VASCULAR ACCESS  2015  
 right chest- port placed  HX VASCULAR ACCESS Left 2017 [facility] CATH  
 
SOC: 
Social History Socioeconomic History  Marital status:  Spouse name: Not on file  Number of children: Not on file  Years of education: Not on file  Highest education level: Not on file Occupational History  Not on file Social Needs  Financial resource strain: Not on file  Food insecurity Worry: Not on file Inability: Not on file  Transportation needs Medical: Not on file Non-medical: Not on file Tobacco Use  Smoking status: Never Smoker  Smokeless tobacco: Never Used Substance and Sexual Activity  Alcohol use: Yes Comment: 1 drink per month  Drug use: No  
 Sexual activity: Yes Lifestyle  Physical activity Days per week: Not on file Minutes per session: Not on file  Stress: Not on file Relationships  Social connections Talks on phone: Not on file Gets together: Not on file Attends Synagogue service: Not on file Active member of club or organization: Not on file Attends meetings of clubs or organizations: Not on file Relationship status: Not on file  Intimate partner violence Fear of current or ex partner: Not on file Emotionally abused: Not on file Physically abused: Not on file Forced sexual activity: Not on file Other Topics Concern  Not on file Social History Narrative Lives in [facility] alone.  passed away in 5/16 of a heart attack. Has 2 sons. Disability. 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 Enjoys swimming and going to the gym. Family History Family History Problem Relation Age of Onset  Hypertension Mother Duffy Arthritis-osteo Mother  Hypertension Father  Arthritis-osteo Father  Cancer Father PROSTATE  COPD Father  Hypertension Brother  Elevated Lipids Brother  Arthritis-osteo Brother  Hypertension Brother  Elevated Lipids Brother  Obesity Brother  Cancer Brother PROSTATE  Anesth Problems Son DELAYED AWAKENING  
 Diabetes Maternal Grandmother  Anesth Problems Paternal Grandmother PATIENT STATES GRANDMOTHER'S HEART STOPPED DURING SURGERY Medications: (reviewed) Current Outpatient Medications on File Prior to Visit Medication Sig Dispense Refill  traMADoL (ULTRAM) 50 mg tablet Take 1 Tab by mouth every eight (8) hours as needed for Pain for up to 30 days. Max Daily Amount: 150 mg. Indications: neuropathic pain, pain 30 Tab 0  
 apixaban (Eliquis) 5 mg tablet Take 1 Tab by mouth two (2) times a day for 30 days. 60 Tab 2  
 omega-3 acid ethyl esters (LOVAZA) 1 gram capsule TAKE 1 CAPSULE BY MOUTH TWICE A  Cap 2  
 rosuvastatin (CRESTOR) 10 mg tablet Take 10 mg by mouth nightly.  doxazosin (CARDURA) 1 mg tablet Take  by mouth daily.  doxazosin (CARDURA) 1 mg tablet Take 1 mg by mouth daily.  SULFAMETHOXAZOLE-TRIMETHOPRIM PO Take  by mouth two (2) times a day. 7 days  dexAMETHasone (DECADRON) 4 mg tablet Take 2 tablets with breakfast the day before chemo and for 2 days after chemo 36 Tab 2  
 lidocaine-prilocaine (EMLA) topical cream Apply small amount over port area and cover with band aid one hour before chemo 30 g 3  
 ondansetron (ZOFRAN ODT) 4 mg disintegrating tablet Take 1 Tab by mouth every eight (8) hours as needed for Nausea. Indications: nausea and vomiting caused by cancer drugs 30 Tab 2  predniSONE (DELTASONE) 50 mg tablet Take 1 tablet 13 hours prior to scan, take 1 tablet 7 hours prior to scan and take 1 tablet one hour prior to scan 3 Tab 0  
 fluticasone propionate (FLONASE) 50 mcg/actuation nasal spray 2 SPRAYS IN BOTH NOSTRILS DAILY 1 Bottle 0  
 cycloSPORINE (RESTASIS) 0.05 % dpet  albuterol (PROVENTIL VENTOLIN) 2.5 mg /3 mL (0.083 %) nebu Take 2.5 mg by inhalation.  dextroamphetamine-amphetamine (ADDERALL) 20 mg tablet TAKE 1 TABLET BY MOUTH EVERY DAY  0  
 rosuvastatin (CRESTOR) 10 mg tablet TAKE 1 TABLET BY MOUTH EVERY DAY  0  
 atorvastatin (LIPITOR) 20 mg tablet  BD ULTRA-FINE SHORT PEN NEEDLE 31 gauge x 5/16\" ndle USE TO INJECT INSULIN 5 TIMES DAILY 200 Pen Needle 11  
 insulin aspart U-100 (NOVOLOG FLEXPEN U-100 INSULIN) 100 unit/mL (3 mL) inpn INJECT 20 UNITS BEFORE EACH MEAL + 4 UNITS FOR EVERY 50 MG/DL ABOVE 150 MG/DL.  UNITS PER DAY 90 Adjustable Dose Pre-filled Pen Syringe 3  
 LEVEMIR FLEXTOUCH U-100 INSULN 100 unit/mL (3 mL) inpn INJECT 30 UNITS IN AM AND 50 UNITS AT NIGHT. INCREASE AS DIRECTED TO  UNITS/DAY. 90 Adjustable Dose Pre-filled Pen Syringe 3  DEXILANT 60 mg CpDB capsule (delayed release) TAKE 1 CAPSULE BY MOUTH EVERY DAY 90 Cap 3  
 LINZESS 145 mcg cap capsule TAKE 1 CAPSULE BY MOUTH EVERY DAYY 30 Cap 0  
 levothyroxine (SYNTHROID) 150 mcg tablet TAKE 1 TABLET BY MOUTH EVERY DAY BEFORE BREAKFAST 90 Tab 0  
 EPINEPHrine (EPIPEN) 0.3 mg/0.3 mL injection INJECT 0.3 ML BY INTRAMUSCULAR ROUTE ONCE AS NEEDED FOR UP TO 1 DOSE.  1  
 glucose blood VI test strips (TRUE METRIX GLUCOSE TEST STRIP) strip by Does Not Apply route See Admin Instructions. 30 Strip 5  
 lancets misc by Does Not Apply route daily. True Metrix lancets- test blood sugar once per day 30 Each 5  Blood-Glucose Meter monitoring kit Check up to tid, as directed 1 Kit 0  
 azelastine (ASTELIN) 137 mcg (0.1 %) nasal spray 1 [place] by Both Nostrils route daily as needed. Use in each nostril as directed  insulin detemir U-100 (LEVEMIR) 100 unit/mL injection 20 Units by SubCUTAneous route daily (with lunch).  montelukast (SINGULAIR) 10 mg tablet Take 10 mg by mouth nightly.  ascorbic acid, vitamin C, (VITAMIN C) 1,000 mg tablet Take 1,000 mg by mouth daily.  albuterol (PROVENTIL VENTOLIN) 2.5 mg /3 mL (0.083 %) nebulizer solution 2.5 mg by Nebulization route every four (4) hours as needed for Wheezing.  Liraglutide (VICTOZA) 0.6 mg/0.1 mL (18 mg/3 mL) pnij 1.8 mg by SubCUTAneous route daily (with lunch).  3 Pen 3  
  SYMBICORT 160-4.5 mcg/actuation HFAA Take 2 Puffs by inhalation two (2) times daily as needed. 2 Inhaler 3  clonazePAM (KLONOPIN) 0.5 mg tablet Take 0.5 mg by mouth nightly as needed.  sertraline (ZOLOFT) 50 mg tablet TAKE 1 TABLET BY MOUTH as needed  1  cholecalciferol, VITAMIN D3, (VITAMIN D3) 5,000 unit tab tablet Take 5,000 Units by mouth daily.  furosemide (LASIX) 40 mg tablet Take 40 mg by mouth every other day. 3  
 telmisartan (MICARDIS) 20 mg tablet TAKE 1 TABLET BY MOUTH EVERY DAY 90 Tab 3 No current facility-administered medications on file prior to visit. Allergies: (reviewed) Allergies Allergen Reactions  Carboplatin Other (comments) Patient developed shortness of breath, felt faint, coughing, skin flushed and \"itchy\". This occurred on the patients 8th treatment.  Iodinated Contrast Media Rash 13 hr pre-medication prior to IV contrast.  
Patient has done well with 1 hr pre-medication of (Solu-Medrol) 40mg &  (Benadryl) 50mg.  Lipitor [Atorvastatin] Myalgia  Shellfish Containing Products Hives  Tape [Adhesive] Itching and Other (comments) \"op site-- clear,thin tape\"--caused blister  Tomato Hives  Iodine And Iodide Containing Products Rash  Olmesartan Other (comments)  Losartan Other (comments)  
  headaches  Percocet [Oxycodone-Acetaminophen] Other (comments) Fever; however, current home med OBJECTIVE Physical Exam 
Visit Vitals BP (!) 154/79 (BP 1 Location: Left arm, BP Patient Position: Sitting) Pulse 84 Temp (!) 96.5 °F (35.8 °C) (Temporal) Ht 5' 8\" (1.727 m) Wt 273 lb (123.8 kg) LMP 09/07/2011 BMI 41.51 kg/m² Physical Exam: 
General: Alert and oriented. No acute distress. Well-nourished HEENT: No thyroid enlargment. Neck supple without restrictions. Sclera normal. Normal occular motion. Moist mucous membranes. Lymphatics: No evidence of axillary, cervical, or subclavicular adenopathy. Respiratory: clear to auscultation and percussion to the bases. No CVAT. Chest wall mass is still palpable along the superior sternum at the level of the superior aspect of the breasts, but improved. Normal overlying skin Cardiovascular: regular rate and rhythm. No murmurs, rubs, or gallops. Gastrointestinal: soft, non-tender, non-distended, no masses or organomegaly. Well-healed incision. Musculoskeletal: normal gait. No joint tenderness, deformity or swelling. No muscular tenderness. Extremities: extremities normal, atraumatic, mild 1+ edema bilaterally. Pelvic: deferred today Neuro: Grossly intact. Normal gait and movement. No acute deficit Skin: No evidence of rashes or skin changes. DATE REVIEW as available: 
Lab Results Component Value Date/Time WBC 3.9 12/17/2020 08:17 AM  
 Hemoglobin (POC) 10.5 (L) 03/20/2017 12:15 PM  
 HGB 6.3 (L) 12/17/2020 08:17 AM  
 Hematocrit (POC) 31 (L) 03/20/2017 12:15 PM  
 HCT 20.7 (L) 12/17/2020 08:17 AM  
 PLATELET 601 06/87/1684 08:17 AM  
 MCV 94.5 12/17/2020 08:17 AM  
 
Lab Results Component Value Date/Time  
 ABS. NEUTROPHILS 2.2 12/17/2020 08:17 AM  
 
Lab Results Component Value Date/Time Sodium 138 12/17/2020 08:17 AM  
 Potassium 4.1 12/17/2020 08:17 AM  
 Chloride 109 (H) 12/17/2020 08:17 AM  
 CO2 23 12/17/2020 08:17 AM  
 Anion gap 6 12/17/2020 08:17 AM  
 Glucose 167 (H) 12/17/2020 08:17 AM  
 BUN 26 (H) 12/17/2020 08:17 AM  
 Creatinine 1.25 (H) 12/17/2020 08:17 AM  
 BUN/Creatinine ratio 21 (H) 12/17/2020 08:17 AM  
 GFR est AA 53 (L) 12/17/2020 08:17 AM  
 GFR est non-AA 44 (L) 12/17/2020 08:17 AM  
 Calcium 8.3 (L) 12/17/2020 08:17 AM  
 Bilirubin, total 0.4 12/03/2020 08:10 AM  
 Alk. phosphatase 67 12/03/2020 08:10 AM  
 Protein, total 7.6 12/03/2020 08:10 AM  
 Albumin 2.9 (L) 12/03/2020 08:10 AM  
 Globulin 4.7 (H) 12/03/2020 08:10 AM  
 A-G Ratio 0.6 (L) 12/03/2020 08:10 AM  
 ALT (SGPT) 25 12/03/2020 08:10 AM  
 
Ca-125: 6/11/2020: 268 
9/3/2020: 187 ECHO 7/17/18 Left ventricle: Systolic function was normal. Ejection fraction was estimated in the range of 55 % to 60 %. There were no regional wall motion 
abnormalities. Wall thickness was mildly increased. Left atrium: The atrium was mildly dilated. Mitral valve: There was mild regurgitation. Aortic valve: Leaflets exhibited sclerosis without stenosis. Not well visualized. IMPRESSION AND PLAN: 
Ms. Estela Ricks is a 62 y.o. female with recurrent, metastatic ovarian cancer. Heavily pre-treated. Lost to follow-up since 9/2018, at which time patient self-discontinued weekly paclitaxel. Initiated Luetta Justin on 1/23/19. Held after 1 month secondary to rising creatinine. Found to have right hydronephrosis. Right ureteral stent placed 3/18/19 with normalization of creatinine. Completed 2700 cGy to the sternun 7/11/19. Restarted Zejula at 100mg/daily on 4/23/19. Now with rising Ca-125 and progression on CT 6/24/2020. Initiated on weekly Taxol 80mg/m2 days 1, 8, and 15 in a 28-day cycle on 8/6/2020. Problem List Items Addressed This Visit Endocrine Controlled type 2 diabetes mellitus without complication, with long-term current use of insulin ([address] Ovarian cancer ([address] - Primary Relevant Orders CT CHEST W CONT  
 CT ABD PELV W CONT Other Morbid obesity ([address] Mass of chest wall Anemia associated with chemotherapy Lymphedema of both lower extremities Other Visit Diagnoses Pelvic mass Relevant Orders CT CHEST W CONT  
 CT ABD PELV W CONT Chest mass Relevant Orders CT CHEST W CONT  
 CT ABD PELV W CONT Elevated CA-125 Relevant Orders CT CHEST W CONT  
 CT ABD PELV W CONT Reviewed patient's course to date. Presents today for consideration of cycle 6 weekly paclitaxel. Recent RLE DVT. Remains on Eliquis. Patient to remain on Eliquis indefinitely given her active ongoing treatment of her cancer. Tolerated cycle 1-5 well. Her Ca-125 is responding to treatment nicely. Will follow-up pre-chemo labs. Will plan for repeat imaging after cycle 6 given her Ca-125 is responding. Would plan to treat until progression, or may consider maintenance PARPi after 6 cycles with either olaparib or rucaparib. She remains anemic, which is certainly related to her chemotherapy and her underlying medical comorbid conditions. Will plan to transfuse as needed if symptomatic. She is heavily pre-treated and her bone marrow reserve remains low. Urology is managing her stent and recurrent UTIs. Has not required antibiotics for some time. Stent exchanged on 10/30/2020 per patient. The patient has underlying diabetes and neuropathy. She has follow-up next week regarding numbness in the bottoms of her feet. I expressed my concerns that we will need to watch this closely while on weekly Taxol, as this may certainly worsen her diabetic neuropathy. All questions and concerns were addressed with the patient and she is comfortable with the plan.  
 
Andi Vidal MD

## 2020-12-22 NOTE — PROGRESS NOTES
Chemotherapy, post C5D15 
 
1. Have you been to the ER, urgent care clinic since your last visit? Hospitalized since your last visit?  no 
 
2. Have you seen or consulted any other health care providers outside of the [address] [place] [address] since your last visit? Include any pap smears or colon screening.    no

## 2020-12-30 NOTE — PROGRESS NOTES
[facility] [place] Chemotherapy Progress Note Date: 2020 Name: Levi Vásquez MRN: [id number] : 1960 
 
 
0800 Pt admit to [facility] for Taxol and blood ambulatory in stable condition. Assessment completed. No new concerns voiced. Port with positive blood return. Patient denies SOB, fever, cough, general not feeling well. Patient denies recent exposure to someone who has tested positive for COVID-19. Patient denies having contact with anyone who has a pending COVID test. 
 
 
 
  
 
Chemotherapy Flowsheet 2020 Cycle C6D1 Date 2020 Drug / Regimen Taxol Dosage -  
Pre Hydration -  
Pre Meds given Notes given Ms. Moore's vitals were reviewed. Patient Vitals for the past 12 hrs: 
 Temp Pulse BP  
20 1430 98 °F (36.7 °C) 74 (!) 142/79  
20 1405 97.3 °F (36.3 °C) 77 135/89  
20 1335 97.6 °F (36.4 °C) 74 (!) 149/86  
20 1235 97.4 °F (36.3 °C) 77 (!) 143/77  
20 1205 97.7 °F (36.5 °C) 74 134/79  
20 1150 97.5 °F (36.4 °C) 71 130/78  
20 1133 97.4 °F (36.3 °C) 75 (!) 155/84  
20 1113 97.3 °F (36.3 °C) 76 136/80  
20 0811 96.9 °F (36.1 °C) 95 (!) 180/81 Lab results were obtained and reviewed. Recent Results (from the past 12 hour(s)) TYPE & SCREEN Collection Time: 20  8:15 AM  
Result Value Ref Range Crossmatch Expiration 2021,3024 ABO/Rh(D) O POSITIVE Antibody screen NEG Unit number [id number] Blood component type RC LR Unit division 00 Status of unit ISSUED Crossmatch result Compatible RBC, ALLOCATE Collection Time: 20  8:15 AM  
Result Value Ref Range HISTORY CHECKED? Historical check performed Pre-medications  were administered as ordered and chemotherapy was initiated. Medications Administered 0.9% sodium chloride infusion Admin Date 
2020 Action New Bag Dose 25 mL/hr Rate 25 mL/hr Route IntraVENous Administered By 
 John Delgado, NIKIA  
  
  
 dexamethasone (DECADRON) 12 mg in 0.9% sodium chloride 50 mL, overfill volume 5 mL IVPB Admin Date 
12/30/2020 Action Given Dose 
12 mg Rate 232 mL/hr Route IntraVENous Administered By 
John Delgado RN  
  
  
 diphenhydrAMINE (BENADRYL) injection 50 mg   
 Admin Date 
12/30/2020 Action Given Dose 50 mg Route IntraVENous Administered By 
John Delgado RN  
  
  
 famotidine (PF) (PEPCID) 20 mg in 0.9% sodium chloride 10 mL injection Admin Date 
12/30/2020 Action Given Dose 
20 mg Route IntraVENous Administered By 
John Delgado RN  
  
  
 PACLitaxeL (TAXOL) 197 mg in 0.9% sodium chloride 250 mL, overfill volume 25 mL chemo infusion Admin Date 
12/30/2020 Action New Bag Dose 
197 mg Rate 
307.8 mL/hr Route IntraVENous Administered By 
John Delgado, NIKIA  
  
  
 palonosetron HCl (ALOXI) injection 0.25 mg   
 Admin Date 
12/30/2020 Action Given Dose 0.25 mg Route IntraVENous Administered By 
John Delgado, NIKIA Blood Transfusion Patient Vitals for the past 12 hrs: 
 Temp Pulse BP  
12/30/20 1430 98 °F (36.7 °C) 74 (!) 142/79  
12/30/20 1405 97.3 °F (36.3 °C) 77 135/89  
12/30/20 1335 97.6 °F (36.4 °C) 74 (!) 149/86  
12/30/20 1235 97.4 °F (36.3 °C) 77 (!) 143/77  
12/30/20 1205 97.7 °F (36.5 °C) 74 134/79  
12/30/20 1150 97.5 °F (36.4 °C) 71 130/78  
12/30/20 1133 97.4 °F (36.3 °C) 75 (!) 155/84  
12/30/20 1113 97.3 °F (36.3 °C) 76 136/80  
12/30/20 0811 96.9 °F (36.1 °C) 95 (!) 180/81 1135 First unit initiated. 1405 First unit completed. Pt monitored post transfusion with no s/s of reaction. Educated and provided with written material regarding s/s of delayed reaction to watch for at home. Pt tolerated treatment well. Patient refused to stay post infusion for an hour. 1430 Pt tolerated treatment well. Port maintained positive blood return throughout treatment. Flushed, heparinized and de-accessed per protocol. D/c home ambulatory in no distress. Future Appointments Date Time Provider Gabriel Phillip 1/6/2021  8:00 AM B1 [facility]  
1/13/2021  8:00 AM B1 [facility]  
1/20/2021 10:00 AM [facility] CT ER 1 [facility]  
1/26/2021 11:30 AM MD ALMA HillO BS CHIKI Valerio, NIKIA December 30, 2020

## 2021-01-01 ENCOUNTER — APPOINTMENT (OUTPATIENT)
Dept: GENERAL RADIOLOGY | Age: 61
DRG: 870 | End: 2021-01-01
Attending: INTERNAL MEDICINE
Payer: MEDICARE

## 2021-01-01 ENCOUNTER — TELEPHONE (OUTPATIENT)
Dept: GYNECOLOGY | Age: 61
End: 2021-01-01

## 2021-01-01 ENCOUNTER — HOSPITAL ENCOUNTER (OUTPATIENT)
Dept: INFUSION THERAPY | Age: 61
Discharge: HOME | End: 2021-01-13
Payer: MEDICARE

## 2021-01-01 ENCOUNTER — HOSPITAL ENCOUNTER (OUTPATIENT)
Dept: INFUSION THERAPY | Age: 61
Discharge: HOME | End: 2021-03-10
Payer: MEDICARE

## 2021-01-01 ENCOUNTER — APPOINTMENT (OUTPATIENT)
Dept: GENERAL RADIOLOGY | Age: 61
DRG: 870 | End: 2021-01-01
Attending: NURSE PRACTITIONER
Payer: MEDICARE

## 2021-01-01 ENCOUNTER — HOSPITAL ENCOUNTER (OUTPATIENT)
Dept: INFUSION THERAPY | Age: 61
Discharge: HOME | End: 2021-01-06
Payer: MEDICARE

## 2021-01-01 ENCOUNTER — HOSPITAL ENCOUNTER (OUTPATIENT)
Age: 61
Setting detail: OBSERVATION
Discharge: HOME | End: 2021-02-27
Attending: OBSTETRICS & GYNECOLOGY | Admitting: OBSTETRICS & GYNECOLOGY
Payer: MEDICARE

## 2021-01-01 ENCOUNTER — APPOINTMENT (OUTPATIENT)
Dept: CT IMAGING | Age: 61
DRG: 870 | End: 2021-01-01
Attending: NURSE PRACTITIONER
Payer: MEDICARE

## 2021-01-01 ENCOUNTER — APPOINTMENT (OUTPATIENT)
Dept: GENERAL RADIOLOGY | Age: 61
End: 2021-01-01
Attending: PHYSICIAN ASSISTANT
Payer: MEDICARE

## 2021-01-01 ENCOUNTER — OFFICE VISIT (OUTPATIENT)
Dept: GYNECOLOGY | Age: 61
End: 2021-01-01

## 2021-01-01 ENCOUNTER — HOSPITAL ENCOUNTER (OUTPATIENT)
Dept: INFUSION THERAPY | Age: 61
End: 2021-01-01

## 2021-01-01 ENCOUNTER — HOSPITAL ENCOUNTER (OUTPATIENT)
Dept: INFUSION THERAPY | Age: 61
Discharge: HOME | End: 2021-02-24
Payer: MEDICARE

## 2021-01-01 ENCOUNTER — HOSPITAL ENCOUNTER (OUTPATIENT)
Dept: INFUSION THERAPY | Age: 61
Discharge: HOME | End: 2021-04-07
Payer: MEDICARE

## 2021-01-01 ENCOUNTER — HOSPITAL ENCOUNTER (OUTPATIENT)
Dept: INFUSION THERAPY | Age: 61
Discharge: HOME | End: 2021-03-31
Payer: MEDICARE

## 2021-01-01 ENCOUNTER — HOSPITAL ENCOUNTER (OUTPATIENT)
Dept: VASCULAR SURGERY | Age: 61
Discharge: HOME | End: 2021-01-28
Attending: PHYSICIAN ASSISTANT
Payer: MEDICARE

## 2021-01-01 ENCOUNTER — IMMUNIZATION (OUTPATIENT)
Dept: INTERNAL MEDICINE CLINIC | Age: 61
End: 2021-01-01
Payer: MEDICARE

## 2021-01-01 ENCOUNTER — HOSPITAL ENCOUNTER (INPATIENT)
Age: 61
LOS: 11 days | DRG: 870 | End: 2021-04-28
Attending: EMERGENCY MEDICINE | Admitting: INTERNAL MEDICINE
Payer: MEDICARE

## 2021-01-01 ENCOUNTER — HOSPITAL ENCOUNTER (OUTPATIENT)
Dept: INFUSION THERAPY | Age: 61
Discharge: HOME | End: 2021-03-03
Payer: MEDICARE

## 2021-01-01 ENCOUNTER — HOSPITAL ENCOUNTER (OUTPATIENT)
Dept: INFUSION THERAPY | Age: 61
Discharge: HOME | End: 2021-01-27
Payer: MEDICARE

## 2021-01-01 ENCOUNTER — HOSPITAL ENCOUNTER (OUTPATIENT)
Dept: INFUSION THERAPY | Age: 61
Discharge: HOME | End: 2021-02-10
Payer: MEDICARE

## 2021-01-01 ENCOUNTER — VIRTUAL VISIT (OUTPATIENT)
Dept: GYNECOLOGY | Age: 61
End: 2021-01-01
Payer: MEDICARE

## 2021-01-01 ENCOUNTER — HOSPITAL ENCOUNTER (OUTPATIENT)
Dept: CT IMAGING | Age: 61
End: 2021-01-01
Attending: OBSTETRICS & GYNECOLOGY
Payer: MEDICARE

## 2021-01-01 ENCOUNTER — APPOINTMENT (OUTPATIENT)
Dept: ULTRASOUND IMAGING | Age: 61
DRG: 870 | End: 2021-01-01
Attending: INTERNAL MEDICINE
Payer: MEDICARE

## 2021-01-01 ENCOUNTER — HOSPITAL ENCOUNTER (OUTPATIENT)
Dept: INFUSION THERAPY | Age: 61
Discharge: HOME | End: 2021-02-03
Payer: MEDICARE

## 2021-01-01 ENCOUNTER — HOSPITAL ENCOUNTER (OUTPATIENT)
Dept: INFUSION THERAPY | Age: 61
Discharge: HOME | End: 2021-03-24
Payer: MEDICARE

## 2021-01-01 ENCOUNTER — HOSPITAL ENCOUNTER (OUTPATIENT)
Dept: CT IMAGING | Age: 61
Discharge: HOME | End: 2021-01-20
Attending: OBSTETRICS & GYNECOLOGY
Payer: MEDICARE

## 2021-01-01 ENCOUNTER — APPOINTMENT (OUTPATIENT)
Dept: GENERAL RADIOLOGY | Age: 61
DRG: 870 | End: 2021-01-01
Attending: EMERGENCY MEDICINE
Payer: MEDICARE

## 2021-01-01 VITALS
SYSTOLIC BLOOD PRESSURE: 121 MMHG | TEMPERATURE: 95.6 F | BODY MASS INDEX: 41.68 KG/M2 | DIASTOLIC BLOOD PRESSURE: 70 MMHG | WEIGHT: 275 LBS | HEART RATE: 86 BPM | HEIGHT: 68 IN

## 2021-01-01 VITALS
SYSTOLIC BLOOD PRESSURE: 167 MMHG | HEIGHT: 68 IN | HEART RATE: 80 BPM | TEMPERATURE: 97.3 F | WEIGHT: 269.4 LBS | DIASTOLIC BLOOD PRESSURE: 91 MMHG | BODY MASS INDEX: 40.83 KG/M2

## 2021-01-01 VITALS
BODY MASS INDEX: 41.28 KG/M2 | OXYGEN SATURATION: 99 % | HEIGHT: 68 IN | SYSTOLIC BLOOD PRESSURE: 169 MMHG | DIASTOLIC BLOOD PRESSURE: 89 MMHG | WEIGHT: 272.4 LBS | HEART RATE: 79 BPM | TEMPERATURE: 97.1 F

## 2021-01-01 VITALS
BODY MASS INDEX: 41.37 KG/M2 | DIASTOLIC BLOOD PRESSURE: 90 MMHG | RESPIRATION RATE: 18 BRPM | HEART RATE: 78 BPM | SYSTOLIC BLOOD PRESSURE: 172 MMHG | TEMPERATURE: 97.1 F | WEIGHT: 273 LBS | HEIGHT: 68 IN

## 2021-01-01 VITALS
DIASTOLIC BLOOD PRESSURE: 88 MMHG | RESPIRATION RATE: 16 BRPM | OXYGEN SATURATION: 96 % | SYSTOLIC BLOOD PRESSURE: 154 MMHG | TEMPERATURE: 98.1 F | HEART RATE: 69 BPM

## 2021-01-01 VITALS
BODY MASS INDEX: 41.37 KG/M2 | HEIGHT: 68 IN | WEIGHT: 273 LBS | TEMPERATURE: 97.2 F | DIASTOLIC BLOOD PRESSURE: 82 MMHG | SYSTOLIC BLOOD PRESSURE: 145 MMHG | HEART RATE: 72 BPM

## 2021-01-01 VITALS
SYSTOLIC BLOOD PRESSURE: 82 MMHG | DIASTOLIC BLOOD PRESSURE: 42 MMHG | HEIGHT: 69 IN | BODY MASS INDEX: 37.49 KG/M2 | TEMPERATURE: 96.3 F | WEIGHT: 253.09 LBS | OXYGEN SATURATION: 80 %

## 2021-01-01 VITALS
RESPIRATION RATE: 18 BRPM | WEIGHT: 273 LBS | TEMPERATURE: 97.1 F | BODY MASS INDEX: 41.37 KG/M2 | HEART RATE: 78 BPM | HEIGHT: 68 IN | DIASTOLIC BLOOD PRESSURE: 78 MMHG | SYSTOLIC BLOOD PRESSURE: 146 MMHG

## 2021-01-01 VITALS
BODY MASS INDEX: 42.74 KG/M2 | HEIGHT: 68 IN | RESPIRATION RATE: 18 BRPM | TEMPERATURE: 97.7 F | DIASTOLIC BLOOD PRESSURE: 90 MMHG | WEIGHT: 282 LBS | HEART RATE: 81 BPM | SYSTOLIC BLOOD PRESSURE: 174 MMHG

## 2021-01-01 VITALS
WEIGHT: 272.4 LBS | SYSTOLIC BLOOD PRESSURE: 160 MMHG | DIASTOLIC BLOOD PRESSURE: 85 MMHG | TEMPERATURE: 97.8 F | HEART RATE: 92 BPM | HEIGHT: 68 IN | BODY MASS INDEX: 41.28 KG/M2

## 2021-01-01 VITALS
HEART RATE: 74 BPM | HEIGHT: 68 IN | BODY MASS INDEX: 41.37 KG/M2 | SYSTOLIC BLOOD PRESSURE: 155 MMHG | WEIGHT: 273 LBS | DIASTOLIC BLOOD PRESSURE: 74 MMHG | RESPIRATION RATE: 18 BRPM | TEMPERATURE: 97.1 F

## 2021-01-01 VITALS
BODY MASS INDEX: 40.98 KG/M2 | SYSTOLIC BLOOD PRESSURE: 134 MMHG | TEMPERATURE: 97.9 F | WEIGHT: 270.4 LBS | HEIGHT: 68 IN | RESPIRATION RATE: 18 BRPM | HEART RATE: 58 BPM | DIASTOLIC BLOOD PRESSURE: 68 MMHG

## 2021-01-01 VITALS
HEART RATE: 82 BPM | HEIGHT: 68 IN | BODY MASS INDEX: 42.25 KG/M2 | SYSTOLIC BLOOD PRESSURE: 164 MMHG | RESPIRATION RATE: 18 BRPM | TEMPERATURE: 97.2 F | WEIGHT: 278.8 LBS | DIASTOLIC BLOOD PRESSURE: 87 MMHG

## 2021-01-01 VITALS
TEMPERATURE: 96.8 F | SYSTOLIC BLOOD PRESSURE: 177 MMHG | HEART RATE: 74 BPM | BODY MASS INDEX: 41.07 KG/M2 | HEIGHT: 68 IN | DIASTOLIC BLOOD PRESSURE: 74 MMHG | WEIGHT: 271 LBS

## 2021-01-01 DIAGNOSIS — C80.0 CARCINOMATOSIS (HCC): ICD-10-CM

## 2021-01-01 DIAGNOSIS — C80.0 CARCINOMATOSIS (HCC): Primary | ICD-10-CM

## 2021-01-01 DIAGNOSIS — C56.3 MALIGNANT NEOPLASM OF BOTH OVARIES (HCC): ICD-10-CM

## 2021-01-01 DIAGNOSIS — C56.9 MALIGNANT NEOPLASM OF OVARY, UNSPECIFIED LATERALITY (HCC): ICD-10-CM

## 2021-01-01 DIAGNOSIS — Z79.899 ON ANTINEOPLASTIC CHEMOTHERAPY: ICD-10-CM

## 2021-01-01 DIAGNOSIS — Z95.828 PORT-A-CATH IN PLACE: ICD-10-CM

## 2021-01-01 DIAGNOSIS — Z23 ENCOUNTER FOR IMMUNIZATION: Primary | ICD-10-CM

## 2021-01-01 DIAGNOSIS — Z86.718 PERSONAL HISTORY OF DVT (DEEP VEIN THROMBOSIS): ICD-10-CM

## 2021-01-01 DIAGNOSIS — M17.11 PRIMARY OSTEOARTHRITIS OF RIGHT KNEE: ICD-10-CM

## 2021-01-01 DIAGNOSIS — E66.01 MORBID OBESITY (HCC): ICD-10-CM

## 2021-01-01 DIAGNOSIS — C56.3 MALIGNANT NEOPLASM OF BOTH OVARIES (HCC): Primary | ICD-10-CM

## 2021-01-01 DIAGNOSIS — D64.81 ANEMIA ASSOCIATED WITH CHEMOTHERAPY: ICD-10-CM

## 2021-01-01 DIAGNOSIS — G47.33 OSA ON CPAP: ICD-10-CM

## 2021-01-01 DIAGNOSIS — T45.1X5A ANEMIA ASSOCIATED WITH CHEMOTHERAPY: ICD-10-CM

## 2021-01-01 DIAGNOSIS — Z99.89 OSA ON CPAP: ICD-10-CM

## 2021-01-01 DIAGNOSIS — I82.4Y1 ACUTE DEEP VEIN THROMBOSIS (DVT) OF PROXIMAL VEIN OF RIGHT LOWER EXTREMITY (HCC): ICD-10-CM

## 2021-01-01 DIAGNOSIS — I10 HYPERTENSION, UNSPECIFIED TYPE: ICD-10-CM

## 2021-01-01 DIAGNOSIS — J12.82 PNEUMONIA DUE TO COVID-19 VIRUS: Primary | ICD-10-CM

## 2021-01-01 DIAGNOSIS — R97.1 ELEVATED CA-125: ICD-10-CM

## 2021-01-01 DIAGNOSIS — R19.00 PELVIC MASS: ICD-10-CM

## 2021-01-01 DIAGNOSIS — R22.2 MASS OF CHEST WALL: ICD-10-CM

## 2021-01-01 DIAGNOSIS — M79.604 RIGHT LEG PAIN: Primary | ICD-10-CM

## 2021-01-01 DIAGNOSIS — T45.1X5A CINV (CHEMOTHERAPY-INDUCED NAUSEA AND VOMITING): ICD-10-CM

## 2021-01-01 DIAGNOSIS — R59.0 LYMPHADENOPATHY, MEDIASTINAL: ICD-10-CM

## 2021-01-01 DIAGNOSIS — Z79.4 TYPE 2 DIABETES MELLITUS WITH DIABETIC POLYNEUROPATHY, WITH LONG-TERM CURRENT USE OF INSULIN (HCC): ICD-10-CM

## 2021-01-01 DIAGNOSIS — U07.1 PNEUMONIA DUE TO COVID-19 VIRUS: Primary | ICD-10-CM

## 2021-01-01 DIAGNOSIS — J96.00 ACUTE RESPIRATORY FAILURE DUE TO COVID-19 (HCC): ICD-10-CM

## 2021-01-01 DIAGNOSIS — M79.604 RIGHT LEG PAIN: ICD-10-CM

## 2021-01-01 DIAGNOSIS — E11.42 TYPE 2 DIABETES MELLITUS WITH DIABETIC POLYNEUROPATHY, WITH LONG-TERM CURRENT USE OF INSULIN (HCC): ICD-10-CM

## 2021-01-01 DIAGNOSIS — R22.2 CHEST MASS: ICD-10-CM

## 2021-01-01 DIAGNOSIS — U07.1 ACUTE RESPIRATORY FAILURE DUE TO COVID-19 (HCC): ICD-10-CM

## 2021-01-01 DIAGNOSIS — Z79.4 CONTROLLED TYPE 2 DIABETES MELLITUS WITHOUT COMPLICATION, WITH LONG-TERM CURRENT USE OF INSULIN (HCC): ICD-10-CM

## 2021-01-01 DIAGNOSIS — E11.9 CONTROLLED TYPE 2 DIABETES MELLITUS WITHOUT COMPLICATION, WITH LONG-TERM CURRENT USE OF INSULIN (HCC): ICD-10-CM

## 2021-01-01 DIAGNOSIS — R11.2 CINV (CHEMOTHERAPY-INDUCED NAUSEA AND VOMITING): ICD-10-CM

## 2021-01-01 DIAGNOSIS — E66.01 CLASS 3 SEVERE OBESITY WITH SERIOUS COMORBIDITY AND BODY MASS INDEX (BMI) OF 40.0 TO 44.9 IN ADULT, UNSPECIFIED OBESITY TYPE (HCC): ICD-10-CM

## 2021-01-01 DIAGNOSIS — Z82.49 FAMILY HISTORY OF DVT: ICD-10-CM

## 2021-01-01 DIAGNOSIS — G62.9 NEUROPATHY: Primary | ICD-10-CM

## 2021-01-01 DIAGNOSIS — Z71.89 GOALS OF CARE, COUNSELING/DISCUSSION: ICD-10-CM

## 2021-01-01 DIAGNOSIS — Z91.041 CONTRAST MEDIA ALLERGY: ICD-10-CM

## 2021-01-01 DIAGNOSIS — R31.0 GROSS HEMATURIA: Primary | ICD-10-CM

## 2021-01-01 DIAGNOSIS — C56.9 MALIGNANT NEOPLASM OF OVARY, UNSPECIFIED LATERALITY (HCC): Primary | ICD-10-CM

## 2021-01-01 DIAGNOSIS — M25.551 RIGHT HIP PAIN: ICD-10-CM

## 2021-01-01 LAB
ABO + RH BLD: NORMAL
ADMINISTERED INITIALS, ADMINIT: NORMAL
ALBUMIN SERPL-MCNC: 1.8 G/DL (ref 3.5–5)
ALBUMIN SERPL-MCNC: 1.9 G/DL (ref 3.5–5)
ALBUMIN SERPL-MCNC: 1.9 G/DL (ref 3.5–5)
ALBUMIN SERPL-MCNC: 2 G/DL (ref 3.5–5)
ALBUMIN SERPL-MCNC: 2 G/DL (ref 3.5–5)
ALBUMIN SERPL-MCNC: 2.1 G/DL (ref 3.5–5)
ALBUMIN SERPL-MCNC: 2.2 G/DL (ref 3.5–5)
ALBUMIN SERPL-MCNC: 2.3 G/DL (ref 3.5–5)
ALBUMIN SERPL-MCNC: 3 G/DL (ref 3.5–5)
ALBUMIN SERPL-MCNC: 3.1 G/DL (ref 3.5–5)
ALBUMIN SERPL-MCNC: 3.3 G/DL (ref 3.5–5)
ALBUMIN SERPL-MCNC: 3.3 G/DL (ref 3.5–5)
ALBUMIN SERPL-MCNC: 3.4 G/DL (ref 3.5–5)
ALBUMIN SERPL-MCNC: 3.4 G/DL (ref 3.5–5)
ALBUMIN SERPL-MCNC: 3.6 G/DL (ref 3.8–4.9)
ALBUMIN/GLOB SERPL: 0.4 {RATIO} (ref 1.1–2.2)
ALBUMIN/GLOB SERPL: 0.5 {RATIO} (ref 1.1–2.2)
ALBUMIN/GLOB SERPL: 0.6 {RATIO} (ref 1.1–2.2)
ALBUMIN/GLOB SERPL: 0.7 {RATIO} (ref 1.1–2.2)
ALBUMIN/GLOB SERPL: 0.8 {RATIO} (ref 1.1–2.2)
ALBUMIN/GLOB SERPL: 0.9 {RATIO} (ref 1.1–2.2)
ALBUMIN/GLOB SERPL: 1.5 {RATIO} (ref 1.2–2.2)
ALP SERPL-CCNC: 100 U/L (ref 45–117)
ALP SERPL-CCNC: 102 U/L (ref 45–117)
ALP SERPL-CCNC: 44 IU/L (ref 39–117)
ALP SERPL-CCNC: 46 U/L (ref 45–117)
ALP SERPL-CCNC: 47 U/L (ref 45–117)
ALP SERPL-CCNC: 53 U/L (ref 45–117)
ALP SERPL-CCNC: 56 U/L (ref 45–117)
ALP SERPL-CCNC: 58 U/L (ref 45–117)
ALP SERPL-CCNC: 62 U/L (ref 45–117)
ALP SERPL-CCNC: 64 U/L (ref 45–117)
ALP SERPL-CCNC: 65 U/L (ref 45–117)
ALP SERPL-CCNC: 66 U/L (ref 45–117)
ALP SERPL-CCNC: 66 U/L (ref 45–117)
ALP SERPL-CCNC: 67 U/L (ref 45–117)
ALP SERPL-CCNC: 75 U/L (ref 45–117)
ALP SERPL-CCNC: 78 U/L (ref 45–117)
ALP SERPL-CCNC: 87 U/L (ref 45–117)
ALT SERPL-CCNC: 24 U/L (ref 12–78)
ALT SERPL-CCNC: 29 U/L (ref 12–78)
ALT SERPL-CCNC: 30 U/L (ref 12–78)
ALT SERPL-CCNC: 31 IU/L (ref 0–32)
ALT SERPL-CCNC: 34 U/L (ref 12–78)
ALT SERPL-CCNC: 34 U/L (ref 12–78)
ALT SERPL-CCNC: 35 U/L (ref 12–78)
ALT SERPL-CCNC: 37 U/L (ref 12–78)
ALT SERPL-CCNC: 38 U/L (ref 12–78)
ALT SERPL-CCNC: 39 U/L (ref 12–78)
ALT SERPL-CCNC: 40 U/L (ref 12–78)
ALT SERPL-CCNC: 46 U/L (ref 12–78)
ALT SERPL-CCNC: 47 U/L (ref 12–78)
ALT SERPL-CCNC: 50 U/L (ref 12–78)
ALT SERPL-CCNC: 62 U/L (ref 12–78)
ALT SERPL-CCNC: 71 U/L (ref 12–78)
ALT SERPL-CCNC: 71 U/L (ref 12–78)
ALT SERPL-CCNC: 85 U/L (ref 12–78)
ALT SERPL-CCNC: 96 U/L (ref 12–78)
ANION GAP SERPL CALC-SCNC: 11 MMOL/L (ref 5–15)
ANION GAP SERPL CALC-SCNC: 11 MMOL/L (ref 5–15)
ANION GAP SERPL CALC-SCNC: 3 MMOL/L (ref 5–15)
ANION GAP SERPL CALC-SCNC: 3 MMOL/L (ref 5–15)
ANION GAP SERPL CALC-SCNC: 4 MMOL/L (ref 5–15)
ANION GAP SERPL CALC-SCNC: 5 MMOL/L (ref 5–15)
ANION GAP SERPL CALC-SCNC: 6 MMOL/L (ref 5–15)
ANION GAP SERPL CALC-SCNC: 7 MMOL/L (ref 5–15)
ANION GAP SERPL CALC-SCNC: 7 MMOL/L (ref 5–15)
ANION GAP SERPL CALC-SCNC: 8 MMOL/L (ref 5–15)
ANION GAP SERPL CALC-SCNC: 9 MMOL/L (ref 5–15)
APPEARANCE UR: ABNORMAL
APTT PPP: 28.1 SEC (ref 22.1–31)
APTT PPP: 50.9 SEC (ref 22.1–31)
APTT PPP: 92.6 SEC (ref 22.1–31)
ARTERIAL PATENCY WRIST A: YES
AST SERPL-CCNC: 106 U/L (ref 15–37)
AST SERPL-CCNC: 17 U/L (ref 15–37)
AST SERPL-CCNC: 18 U/L (ref 15–37)
AST SERPL-CCNC: 19 U/L (ref 15–37)
AST SERPL-CCNC: 19 U/L (ref 15–37)
AST SERPL-CCNC: 20 U/L (ref 15–37)
AST SERPL-CCNC: 21 U/L (ref 15–37)
AST SERPL-CCNC: 21 U/L (ref 15–37)
AST SERPL-CCNC: 25 U/L (ref 15–37)
AST SERPL-CCNC: 28 U/L (ref 15–37)
AST SERPL-CCNC: 30 U/L (ref 15–37)
AST SERPL-CCNC: 31 U/L (ref 15–37)
AST SERPL-CCNC: 32 IU/L (ref 0–40)
AST SERPL-CCNC: 45 U/L (ref 15–37)
AST SERPL-CCNC: 47 U/L (ref 15–37)
AST SERPL-CCNC: 62 U/L (ref 15–37)
AST SERPL-CCNC: 93 U/L (ref 15–37)
AST SERPL-CCNC: 94 U/L (ref 15–37)
AST SERPL-CCNC: 95 U/L (ref 15–37)
ATRIAL RATE: 115 BPM
ATRIAL RATE: 77 BPM
BACTERIA SPEC CULT: ABNORMAL
BACTERIA SPEC CULT: NORMAL
BACTERIA URNS QL MICRO: ABNORMAL /HPF
BASE DEFICIT BLD-SCNC: 1 MMOL/L
BASE DEFICIT BLDA-SCNC: 3.5 MMOL/L
BASE DEFICIT BLDA-SCNC: 5.5 MMOL/L
BASE DEFICIT BLDA-SCNC: 6.3 MMOL/L
BASE DEFICIT BLDA-SCNC: 7.3 MMOL/L
BASE EXCESS BLDA CALC-SCNC: 1.1 MMOL/L
BASE EXCESS BLDA CALC-SCNC: 2.8 MMOL/L
BASE EXCESS BLDA CALC-SCNC: 3 MMOL/L
BASOPHILS # BLD AUTO: 0.1 X10E3/UL (ref 0–0.2)
BASOPHILS # BLD: 0 K/UL (ref 0–0.1)
BASOPHILS # BLD: 0.1 K/UL (ref 0–0.1)
BASOPHILS NFR BLD AUTO: 1 %
BASOPHILS NFR BLD: 0 % (ref 0–1)
BASOPHILS NFR BLD: 1 % (ref 0–1)
BDY SITE: ABNORMAL
BILIRUB SERPL-MCNC: 0.4 MG/DL (ref 0.2–1)
BILIRUB SERPL-MCNC: 0.5 MG/DL (ref 0.2–1)
BILIRUB SERPL-MCNC: 0.6 MG/DL (ref 0.2–1)
BILIRUB SERPL-MCNC: 0.7 MG/DL (ref 0.2–1)
BILIRUB SERPL-MCNC: 0.7 MG/DL (ref 0.2–1)
BILIRUB SERPL-MCNC: 0.7 MG/DL (ref 0–1.2)
BILIRUB SERPL-MCNC: 0.8 MG/DL (ref 0.2–1)
BILIRUB SERPL-MCNC: 0.8 MG/DL (ref 0.2–1)
BILIRUB SERPL-MCNC: 0.9 MG/DL (ref 0.2–1)
BILIRUB UR QL CFM: NEGATIVE
BILIRUB UR QL: NEGATIVE
BILIRUB UR QL: NEGATIVE
BLD PROD TYP BPU: NORMAL
BLOOD GROUP ANTIBODIES SERPL: NORMAL
BNP SERPL-MCNC: 394 PG/ML
BPU ID: NORMAL
BUN SERPL-MCNC: 17 MG/DL (ref 6–24)
BUN SERPL-MCNC: 25 MG/DL (ref 6–20)
BUN SERPL-MCNC: 26 MG/DL (ref 6–20)
BUN SERPL-MCNC: 27 MG/DL (ref 6–20)
BUN SERPL-MCNC: 27 MG/DL (ref 6–20)
BUN SERPL-MCNC: 28 MG/DL (ref 6–20)
BUN SERPL-MCNC: 29 MG/DL (ref 6–20)
BUN SERPL-MCNC: 31 MG/DL (ref 6–20)
BUN SERPL-MCNC: 32 MG/DL (ref 6–20)
BUN SERPL-MCNC: 32 MG/DL (ref 6–20)
BUN SERPL-MCNC: 33 MG/DL (ref 6–20)
BUN SERPL-MCNC: 34 MG/DL (ref 6–20)
BUN SERPL-MCNC: 35 MG/DL (ref 6–20)
BUN SERPL-MCNC: 46 MG/DL (ref 6–20)
BUN SERPL-MCNC: 50 MG/DL (ref 6–20)
BUN SERPL-MCNC: 56 MG/DL (ref 6–20)
BUN SERPL-MCNC: 62 MG/DL (ref 6–20)
BUN SERPL-MCNC: 76 MG/DL (ref 6–20)
BUN SERPL-MCNC: 89 MG/DL (ref 6–20)
BUN/CREAT SERPL: 14 (ref 9–23)
BUN/CREAT SERPL: 17 (ref 12–20)
BUN/CREAT SERPL: 18 (ref 12–20)
BUN/CREAT SERPL: 19 (ref 12–20)
BUN/CREAT SERPL: 20 (ref 12–20)
BUN/CREAT SERPL: 20 (ref 12–20)
BUN/CREAT SERPL: 21 (ref 12–20)
BUN/CREAT SERPL: 21 (ref 12–20)
BUN/CREAT SERPL: 22 (ref 12–20)
BUN/CREAT SERPL: 22 (ref 12–20)
BUN/CREAT SERPL: 24 (ref 12–20)
BUN/CREAT SERPL: 25 (ref 12–20)
BUN/CREAT SERPL: 26 (ref 12–20)
BUN/CREAT SERPL: 27 (ref 12–20)
BUN/CREAT SERPL: 29 (ref 12–20)
BUN/CREAT SERPL: 30 (ref 12–20)
BUN/CREAT SERPL: 31 (ref 12–20)
BUN/CREAT SERPL: 31 (ref 12–20)
CA-I BLD-SCNC: 1.11 MMOL/L (ref 1.12–1.32)
CALCIUM SERPL-MCNC: 7 MG/DL (ref 8.5–10.1)
CALCIUM SERPL-MCNC: 7.1 MG/DL (ref 8.5–10.1)
CALCIUM SERPL-MCNC: 7.5 MG/DL (ref 8.5–10.1)
CALCIUM SERPL-MCNC: 7.6 MG/DL (ref 8.5–10.1)
CALCIUM SERPL-MCNC: 7.9 MG/DL (ref 8.5–10.1)
CALCIUM SERPL-MCNC: 8 MG/DL (ref 8.5–10.1)
CALCIUM SERPL-MCNC: 8.1 MG/DL (ref 8.5–10.1)
CALCIUM SERPL-MCNC: 8.2 MG/DL (ref 8.5–10.1)
CALCIUM SERPL-MCNC: 8.4 MG/DL (ref 8.5–10.1)
CALCIUM SERPL-MCNC: 8.5 MG/DL (ref 8.5–10.1)
CALCIUM SERPL-MCNC: 8.6 MG/DL (ref 8.5–10.1)
CALCIUM SERPL-MCNC: 8.7 MG/DL (ref 8.5–10.1)
CALCIUM SERPL-MCNC: 8.8 MG/DL (ref 8.5–10.1)
CALCIUM SERPL-MCNC: 8.9 MG/DL (ref 8.5–10.1)
CALCIUM SERPL-MCNC: 8.9 MG/DL (ref 8.7–10.2)
CALCIUM SERPL-MCNC: 9 MG/DL (ref 8.5–10.1)
CALCIUM SERPL-MCNC: 9.3 MG/DL (ref 8.5–10.1)
CALCULATED P AXIS, ECG09: 43 DEGREES
CALCULATED P AXIS, ECG09: 67 DEGREES
CALCULATED R AXIS, ECG10: -3 DEGREES
CALCULATED R AXIS, ECG10: 21 DEGREES
CALCULATED T AXIS, ECG11: 25 DEGREES
CALCULATED T AXIS, ECG11: 84 DEGREES
CANCER AG125 SERPL-ACNC: 36 U/ML (ref 1.5–35)
CANCER AG125 SERPL-ACNC: 37 U/ML (ref 1.5–35)
CANCER AG125 SERPL-ACNC: 43 U/ML (ref 1.5–35)
CC UR VC: ABNORMAL
CHLORIDE SERPL-SCNC: 101 MMOL/L (ref 97–108)
CHLORIDE SERPL-SCNC: 102 MMOL/L (ref 97–108)
CHLORIDE SERPL-SCNC: 104 MMOL/L (ref 96–106)
CHLORIDE SERPL-SCNC: 106 MMOL/L (ref 97–108)
CHLORIDE SERPL-SCNC: 108 MMOL/L (ref 97–108)
CHLORIDE SERPL-SCNC: 109 MMOL/L (ref 97–108)
CHLORIDE SERPL-SCNC: 110 MMOL/L (ref 97–108)
CHLORIDE SERPL-SCNC: 111 MMOL/L (ref 97–108)
CHLORIDE SERPL-SCNC: 112 MMOL/L (ref 97–108)
CHLORIDE SERPL-SCNC: 112 MMOL/L (ref 97–108)
CHLORIDE SERPL-SCNC: 113 MMOL/L (ref 97–108)
CHLORIDE SERPL-SCNC: 114 MMOL/L (ref 97–108)
CO2 SERPL-SCNC: 19 MMOL/L (ref 21–32)
CO2 SERPL-SCNC: 20 MMOL/L (ref 20–29)
CO2 SERPL-SCNC: 21 MMOL/L (ref 21–32)
CO2 SERPL-SCNC: 22 MMOL/L (ref 21–32)
CO2 SERPL-SCNC: 22 MMOL/L (ref 21–32)
CO2 SERPL-SCNC: 23 MMOL/L (ref 21–32)
CO2 SERPL-SCNC: 24 MMOL/L (ref 21–32)
CO2 SERPL-SCNC: 25 MMOL/L (ref 21–32)
CO2 SERPL-SCNC: 26 MMOL/L (ref 21–32)
CO2 SERPL-SCNC: 28 MMOL/L (ref 21–32)
COLOR UR: ABNORMAL
COMMENT, HOLDF: NORMAL
COVID-19 RAPID TEST, COVR: DETECTED
CREAT SERPL-MCNC: 0.93 MG/DL (ref 0.55–1.02)
CREAT SERPL-MCNC: 0.93 MG/DL (ref 0.55–1.02)
CREAT SERPL-MCNC: 0.97 MG/DL (ref 0.55–1.02)
CREAT SERPL-MCNC: 1.01 MG/DL (ref 0.55–1.02)
CREAT SERPL-MCNC: 1.06 MG/DL (ref 0.55–1.02)
CREAT SERPL-MCNC: 1.06 MG/DL (ref 0.55–1.02)
CREAT SERPL-MCNC: 1.1 MG/DL (ref 0.55–1.02)
CREAT SERPL-MCNC: 1.13 MG/DL (ref 0.55–1.02)
CREAT SERPL-MCNC: 1.17 MG/DL (ref 0.55–1.02)
CREAT SERPL-MCNC: 1.18 MG/DL (ref 0.57–1)
CREAT SERPL-MCNC: 1.2 MG/DL (ref 0.55–1.02)
CREAT SERPL-MCNC: 1.24 MG/DL (ref 0.55–1.02)
CREAT SERPL-MCNC: 1.28 MG/DL (ref 0.55–1.02)
CREAT SERPL-MCNC: 1.29 MG/DL (ref 0.55–1.02)
CREAT SERPL-MCNC: 1.29 MG/DL (ref 0.55–1.02)
CREAT SERPL-MCNC: 1.31 MG/DL (ref 0.55–1.02)
CREAT SERPL-MCNC: 1.35 MG/DL (ref 0.55–1.02)
CREAT SERPL-MCNC: 1.37 MG/DL (ref 0.55–1.02)
CREAT SERPL-MCNC: 1.39 MG/DL (ref 0.55–1.02)
CREAT SERPL-MCNC: 1.69 MG/DL (ref 0.55–1.02)
CREAT SERPL-MCNC: 1.71 MG/DL (ref 0.55–1.02)
CREAT SERPL-MCNC: 2.23 MG/DL (ref 0.55–1.02)
CREAT SERPL-MCNC: 2.58 MG/DL (ref 0.55–1.02)
CREAT SERPL-MCNC: 2.97 MG/DL (ref 0.55–1.02)
CREAT SERPL-MCNC: 4.08 MG/DL (ref 0.55–1.02)
CREAT SERPL-MCNC: 5.3 MG/DL (ref 0.55–1.02)
CROSSMATCH RESULT,%XM: NORMAL
CRP SERPL HS-MCNC: >9.5 MG/L
CRP SERPL-MCNC: 1.67 MG/DL (ref 0–0.6)
CRP SERPL-MCNC: 2.9 MG/DL (ref 0–0.6)
CRP SERPL-MCNC: 5.89 MG/DL (ref 0–0.6)
CRP SERPL-MCNC: 9.21 MG/DL (ref 0–0.6)
D DIMER PPP FEU-MCNC: 10.08 MG/L FEU (ref 0–0.65)
D DIMER PPP FEU-MCNC: 3.36 MG/L FEU (ref 0–0.65)
D DIMER PPP FEU-MCNC: 4.05 MG/L FEU (ref 0–0.65)
D DIMER PPP FEU-MCNC: 4.57 MG/L FEU (ref 0–0.65)
D DIMER PPP FEU-MCNC: 7.94 MG/L FEU (ref 0–0.65)
D DIMER PPP FEU-MCNC: 8.73 MG/L FEU (ref 0–0.65)
D50 ADMINISTERED, D50ADM: 0 ML
D50 ORDER, D50ORD: 0 ML
DATE LAST DOSE: ABNORMAL
DIAGNOSIS, 93000: NORMAL
DIAGNOSIS, 93000: NORMAL
DIFFERENTIAL METHOD BLD: ABNORMAL
EOSINOPHIL # BLD AUTO: 0 X10E3/UL (ref 0–0.4)
EOSINOPHIL # BLD: 0 K/UL (ref 0–0.4)
EOSINOPHIL # BLD: 0.1 K/UL (ref 0–0.4)
EOSINOPHIL # BLD: 0.2 K/UL (ref 0–0.4)
EOSINOPHIL # BLD: 0.2 K/UL (ref 0–0.4)
EOSINOPHIL # BLD: 0.3 K/UL (ref 0–0.4)
EOSINOPHIL # BLD: 0.3 K/UL (ref 0–0.4)
EOSINOPHIL # BLD: 0.4 K/UL (ref 0–0.4)
EOSINOPHIL # BLD: 0.5 K/UL (ref 0–0.4)
EOSINOPHIL NFR BLD AUTO: 0 %
EOSINOPHIL NFR BLD: 0 % (ref 0–7)
EOSINOPHIL NFR BLD: 1 % (ref 0–7)
EOSINOPHIL NFR BLD: 1 % (ref 0–7)
EOSINOPHIL NFR BLD: 2 % (ref 0–7)
EOSINOPHIL NFR BLD: 2 % (ref 0–7)
EOSINOPHIL NFR BLD: 3 % (ref 0–7)
EOSINOPHIL NFR BLD: 4 % (ref 0–7)
EOSINOPHIL NFR BLD: 4 % (ref 0–7)
EOSINOPHIL NFR BLD: 5 % (ref 0–7)
EOSINOPHIL NFR BLD: 7 % (ref 0–7)
EPITH CASTS URNS QL MICRO: ABNORMAL /LPF
ERYTHROCYTE [DISTWIDTH] IN BLOOD BY AUTOMATED COUNT: 16.2 % (ref 11.5–14.5)
ERYTHROCYTE [DISTWIDTH] IN BLOOD BY AUTOMATED COUNT: 16.3 % (ref 11.5–14.5)
ERYTHROCYTE [DISTWIDTH] IN BLOOD BY AUTOMATED COUNT: 17.4 % (ref 11.5–14.5)
ERYTHROCYTE [DISTWIDTH] IN BLOOD BY AUTOMATED COUNT: 17.6 % (ref 11.5–14.5)
ERYTHROCYTE [DISTWIDTH] IN BLOOD BY AUTOMATED COUNT: 17.6 % (ref 11.5–14.5)
ERYTHROCYTE [DISTWIDTH] IN BLOOD BY AUTOMATED COUNT: 17.8 % (ref 11.5–14.5)
ERYTHROCYTE [DISTWIDTH] IN BLOOD BY AUTOMATED COUNT: 17.8 % (ref 11.5–14.5)
ERYTHROCYTE [DISTWIDTH] IN BLOOD BY AUTOMATED COUNT: 18.1 % (ref 11.5–14.5)
ERYTHROCYTE [DISTWIDTH] IN BLOOD BY AUTOMATED COUNT: 18.2 % (ref 11.5–14.5)
ERYTHROCYTE [DISTWIDTH] IN BLOOD BY AUTOMATED COUNT: 18.7 % (ref 11.5–14.5)
ERYTHROCYTE [DISTWIDTH] IN BLOOD BY AUTOMATED COUNT: 19.2 % (ref 11.5–14.5)
ERYTHROCYTE [DISTWIDTH] IN BLOOD BY AUTOMATED COUNT: 19.4 % (ref 11.5–14.5)
ERYTHROCYTE [DISTWIDTH] IN BLOOD BY AUTOMATED COUNT: 19.7 % (ref 11.5–14.5)
ERYTHROCYTE [DISTWIDTH] IN BLOOD BY AUTOMATED COUNT: 20.1 % (ref 11.5–14.5)
ERYTHROCYTE [DISTWIDTH] IN BLOOD BY AUTOMATED COUNT: 20.3 % (ref 11.5–14.5)
ERYTHROCYTE [DISTWIDTH] IN BLOOD BY AUTOMATED COUNT: 20.6 % (ref 11.5–14.5)
ERYTHROCYTE [DISTWIDTH] IN BLOOD BY AUTOMATED COUNT: 20.8 % (ref 11.5–14.5)
ERYTHROCYTE [DISTWIDTH] IN BLOOD BY AUTOMATED COUNT: 21 % (ref 11.7–15.4)
ERYTHROCYTE [DISTWIDTH] IN BLOOD BY AUTOMATED COUNT: 21.1 % (ref 11.5–14.5)
ERYTHROCYTE [DISTWIDTH] IN BLOOD BY AUTOMATED COUNT: 21.1 % (ref 11.5–14.5)
ERYTHROCYTE [DISTWIDTH] IN BLOOD BY AUTOMATED COUNT: 21.3 % (ref 11.5–14.5)
ERYTHROCYTE [DISTWIDTH] IN BLOOD BY AUTOMATED COUNT: 21.6 % (ref 11.5–14.5)
ERYTHROCYTE [DISTWIDTH] IN BLOOD BY AUTOMATED COUNT: 21.7 % (ref 11.5–14.5)
ERYTHROCYTE [DISTWIDTH] IN BLOOD BY AUTOMATED COUNT: 21.8 % (ref 11.5–14.5)
ERYTHROCYTE [DISTWIDTH] IN BLOOD BY AUTOMATED COUNT: 21.9 % (ref 11.5–14.5)
EST. AVERAGE GLUCOSE BLD GHB EST-MCNC: 163 MG/DL
FERRITIN SERPL-MCNC: 1712 NG/ML (ref 26–388)
FERRITIN SERPL-MCNC: 2394 NG/ML (ref 8–252)
FERRITIN SERPL-MCNC: 2678 NG/ML (ref 26–388)
FERRITIN SERPL-MCNC: 2682 NG/ML (ref 26–388)
FERRITIN SERPL-MCNC: 2740 NG/ML (ref 26–388)
FERRITIN SERPL-MCNC: 2850 NG/ML (ref 26–388)
FERRITIN SERPL-MCNC: 2888 NG/ML (ref 8–252)
FERRITIN SERPL-MCNC: 3107 NG/ML (ref 8–252)
FERRITIN SERPL-MCNC: 3234 NG/ML (ref 26–388)
FERRITIN SERPL-MCNC: 3279 NG/ML (ref 8–252)
FERRITIN SERPL-MCNC: 5851 NG/ML (ref 8–252)
FIBRINOGEN PPP-MCNC: 591 MG/DL (ref 200–475)
FIBRINOGEN PPP-MCNC: 706 MG/DL (ref 200–475)
FIO2 ON VENT: 100 %
FIO2 ON VENT: 80 %
GAS FLOW.O2 O2 DELIVERY SYS: ABNORMAL L/MIN
GAS FLOW.O2 SETTING OXYMISER: 15 L/M
GAS FLOW.O2 SETTING OXYMISER: 24 L/MIN
GAS FLOW.O2 SETTING OXYMISER: 24 L/MIN
GLOBULIN SER CALC-MCNC: 2.4 G/DL (ref 1.5–4.5)
GLOBULIN SER CALC-MCNC: 3.6 G/DL (ref 2–4)
GLOBULIN SER CALC-MCNC: 3.6 G/DL (ref 2–4)
GLOBULIN SER CALC-MCNC: 3.7 G/DL (ref 2–4)
GLOBULIN SER CALC-MCNC: 3.8 G/DL (ref 2–4)
GLOBULIN SER CALC-MCNC: 3.8 G/DL (ref 2–4)
GLOBULIN SER CALC-MCNC: 3.9 G/DL (ref 2–4)
GLOBULIN SER CALC-MCNC: 3.9 G/DL (ref 2–4)
GLOBULIN SER CALC-MCNC: 4 G/DL (ref 2–4)
GLOBULIN SER CALC-MCNC: 4.1 G/DL (ref 2–4)
GLOBULIN SER CALC-MCNC: 4.2 G/DL (ref 2–4)
GLOBULIN SER CALC-MCNC: 4.2 G/DL (ref 2–4)
GLOBULIN SER CALC-MCNC: 4.3 G/DL (ref 2–4)
GLOBULIN SER CALC-MCNC: 4.4 G/DL (ref 2–4)
GLOBULIN SER CALC-MCNC: 4.6 G/DL (ref 2–4)
GLOBULIN SER CALC-MCNC: 4.8 G/DL (ref 2–4)
GLOBULIN SER CALC-MCNC: 5.1 G/DL (ref 2–4)
GLSCOM COMMENTS: NORMAL
GLUCOSE BLD STRIP.AUTO-MCNC: 131 MG/DL (ref 65–100)
GLUCOSE BLD STRIP.AUTO-MCNC: 139 MG/DL (ref 65–100)
GLUCOSE BLD STRIP.AUTO-MCNC: 140 MG/DL (ref 65–100)
GLUCOSE BLD STRIP.AUTO-MCNC: 148 MG/DL (ref 65–100)
GLUCOSE BLD STRIP.AUTO-MCNC: 148 MG/DL (ref 65–100)
GLUCOSE BLD STRIP.AUTO-MCNC: 151 MG/DL (ref 65–100)
GLUCOSE BLD STRIP.AUTO-MCNC: 153 MG/DL (ref 65–100)
GLUCOSE BLD STRIP.AUTO-MCNC: 154 MG/DL (ref 65–100)
GLUCOSE BLD STRIP.AUTO-MCNC: 154 MG/DL (ref 65–100)
GLUCOSE BLD STRIP.AUTO-MCNC: 156 MG/DL (ref 65–100)
GLUCOSE BLD STRIP.AUTO-MCNC: 157 MG/DL (ref 65–100)
GLUCOSE BLD STRIP.AUTO-MCNC: 157 MG/DL (ref 65–100)
GLUCOSE BLD STRIP.AUTO-MCNC: 158 MG/DL (ref 65–100)
GLUCOSE BLD STRIP.AUTO-MCNC: 164 MG/DL (ref 65–100)
GLUCOSE BLD STRIP.AUTO-MCNC: 165 MG/DL (ref 65–100)
GLUCOSE BLD STRIP.AUTO-MCNC: 167 MG/DL (ref 65–100)
GLUCOSE BLD STRIP.AUTO-MCNC: 169 MG/DL (ref 65–100)
GLUCOSE BLD STRIP.AUTO-MCNC: 171 MG/DL (ref 65–100)
GLUCOSE BLD STRIP.AUTO-MCNC: 171 MG/DL (ref 65–100)
GLUCOSE BLD STRIP.AUTO-MCNC: 172 MG/DL (ref 65–100)
GLUCOSE BLD STRIP.AUTO-MCNC: 173 MG/DL (ref 65–100)
GLUCOSE BLD STRIP.AUTO-MCNC: 175 MG/DL (ref 65–100)
GLUCOSE BLD STRIP.AUTO-MCNC: 177 MG/DL (ref 65–100)
GLUCOSE BLD STRIP.AUTO-MCNC: 178 MG/DL (ref 65–100)
GLUCOSE BLD STRIP.AUTO-MCNC: 178 MG/DL (ref 65–100)
GLUCOSE BLD STRIP.AUTO-MCNC: 180 MG/DL (ref 65–100)
GLUCOSE BLD STRIP.AUTO-MCNC: 191 MG/DL (ref 65–100)
GLUCOSE BLD STRIP.AUTO-MCNC: 198 MG/DL (ref 65–100)
GLUCOSE BLD STRIP.AUTO-MCNC: 199 MG/DL (ref 65–100)
GLUCOSE BLD STRIP.AUTO-MCNC: 200 MG/DL (ref 65–100)
GLUCOSE BLD STRIP.AUTO-MCNC: 205 MG/DL (ref 65–100)
GLUCOSE BLD STRIP.AUTO-MCNC: 209 MG/DL (ref 65–100)
GLUCOSE BLD STRIP.AUTO-MCNC: 227 MG/DL (ref 65–100)
GLUCOSE BLD STRIP.AUTO-MCNC: 231 MG/DL (ref 65–100)
GLUCOSE BLD STRIP.AUTO-MCNC: 236 MG/DL (ref 65–100)
GLUCOSE BLD STRIP.AUTO-MCNC: 237 MG/DL (ref 65–100)
GLUCOSE BLD STRIP.AUTO-MCNC: 241 MG/DL (ref 65–100)
GLUCOSE BLD STRIP.AUTO-MCNC: 253 MG/DL (ref 65–100)
GLUCOSE BLD STRIP.AUTO-MCNC: 254 MG/DL (ref 65–100)
GLUCOSE BLD STRIP.AUTO-MCNC: 256 MG/DL (ref 65–100)
GLUCOSE BLD STRIP.AUTO-MCNC: 257 MG/DL (ref 65–100)
GLUCOSE BLD STRIP.AUTO-MCNC: 262 MG/DL (ref 65–100)
GLUCOSE BLD STRIP.AUTO-MCNC: 265 MG/DL (ref 65–100)
GLUCOSE BLD STRIP.AUTO-MCNC: 268 MG/DL (ref 65–100)
GLUCOSE BLD STRIP.AUTO-MCNC: 277 MG/DL (ref 65–100)
GLUCOSE BLD STRIP.AUTO-MCNC: 282 MG/DL (ref 65–100)
GLUCOSE BLD STRIP.AUTO-MCNC: 283 MG/DL (ref 65–100)
GLUCOSE BLD STRIP.AUTO-MCNC: 294 MG/DL (ref 65–100)
GLUCOSE BLD STRIP.AUTO-MCNC: 294 MG/DL (ref 65–100)
GLUCOSE BLD STRIP.AUTO-MCNC: 298 MG/DL (ref 65–100)
GLUCOSE BLD STRIP.AUTO-MCNC: 299 MG/DL (ref 65–100)
GLUCOSE BLD STRIP.AUTO-MCNC: 321 MG/DL (ref 65–100)
GLUCOSE BLD STRIP.AUTO-MCNC: 323 MG/DL (ref 65–100)
GLUCOSE BLD STRIP.AUTO-MCNC: 329 MG/DL (ref 65–100)
GLUCOSE BLD STRIP.AUTO-MCNC: 341 MG/DL (ref 65–100)
GLUCOSE BLD STRIP.AUTO-MCNC: 342 MG/DL (ref 65–100)
GLUCOSE BLD STRIP.AUTO-MCNC: 350 MG/DL (ref 65–100)
GLUCOSE BLD STRIP.AUTO-MCNC: 356 MG/DL (ref 65–100)
GLUCOSE BLD STRIP.AUTO-MCNC: 380 MG/DL (ref 65–100)
GLUCOSE BLD STRIP.AUTO-MCNC: 384 MG/DL (ref 65–100)
GLUCOSE BLD STRIP.AUTO-MCNC: 462 MG/DL (ref 65–100)
GLUCOSE BLD STRIP.AUTO-MCNC: 463 MG/DL (ref 65–100)
GLUCOSE BLD STRIP.AUTO-MCNC: 473 MG/DL (ref 65–100)
GLUCOSE BLD STRIP.AUTO-MCNC: 487 MG/DL (ref 65–100)
GLUCOSE BLD STRIP.AUTO-MCNC: 498 MG/DL (ref 65–100)
GLUCOSE BLD STRIP.AUTO-MCNC: 513 MG/DL (ref 65–100)
GLUCOSE SERPL-MCNC: 121 MG/DL (ref 65–99)
GLUCOSE SERPL-MCNC: 137 MG/DL (ref 65–100)
GLUCOSE SERPL-MCNC: 140 MG/DL (ref 65–100)
GLUCOSE SERPL-MCNC: 154 MG/DL (ref 65–100)
GLUCOSE SERPL-MCNC: 161 MG/DL (ref 65–100)
GLUCOSE SERPL-MCNC: 163 MG/DL (ref 65–100)
GLUCOSE SERPL-MCNC: 171 MG/DL (ref 65–100)
GLUCOSE SERPL-MCNC: 174 MG/DL (ref 65–100)
GLUCOSE SERPL-MCNC: 175 MG/DL (ref 65–100)
GLUCOSE SERPL-MCNC: 176 MG/DL (ref 65–100)
GLUCOSE SERPL-MCNC: 181 MG/DL (ref 65–100)
GLUCOSE SERPL-MCNC: 181 MG/DL (ref 65–100)
GLUCOSE SERPL-MCNC: 182 MG/DL (ref 65–100)
GLUCOSE SERPL-MCNC: 183 MG/DL (ref 65–100)
GLUCOSE SERPL-MCNC: 205 MG/DL (ref 65–100)
GLUCOSE SERPL-MCNC: 209 MG/DL (ref 65–100)
GLUCOSE SERPL-MCNC: 211 MG/DL (ref 65–100)
GLUCOSE SERPL-MCNC: 214 MG/DL (ref 65–100)
GLUCOSE SERPL-MCNC: 215 MG/DL (ref 65–100)
GLUCOSE SERPL-MCNC: 223 MG/DL (ref 65–100)
GLUCOSE SERPL-MCNC: 241 MG/DL (ref 65–100)
GLUCOSE SERPL-MCNC: 270 MG/DL (ref 65–100)
GLUCOSE SERPL-MCNC: 295 MG/DL (ref 65–100)
GLUCOSE SERPL-MCNC: 326 MG/DL (ref 65–100)
GLUCOSE SERPL-MCNC: 350 MG/DL (ref 65–100)
GLUCOSE SERPL-MCNC: 496 MG/DL (ref 65–100)
GLUCOSE UR STRIP.AUTO-MCNC: NEGATIVE MG/DL
GLUCOSE, GLC: 131 MG/DL
GLUCOSE, GLC: 139 MG/DL
GLUCOSE, GLC: 140 MG/DL
GLUCOSE, GLC: 148 MG/DL
GLUCOSE, GLC: 151 MG/DL
GLUCOSE, GLC: 153 MG/DL
GLUCOSE, GLC: 154 MG/DL
GLUCOSE, GLC: 154 MG/DL
GLUCOSE, GLC: 156 MG/DL
GLUCOSE, GLC: 157 MG/DL
GLUCOSE, GLC: 157 MG/DL
GLUCOSE, GLC: 158 MG/DL
GLUCOSE, GLC: 161 MG/DL
GLUCOSE, GLC: 164 MG/DL
GLUCOSE, GLC: 165 MG/DL
GLUCOSE, GLC: 165 MG/DL
GLUCOSE, GLC: 167 MG/DL
GLUCOSE, GLC: 169 MG/DL
GLUCOSE, GLC: 169 MG/DL
GLUCOSE, GLC: 170 MG/DL
GLUCOSE, GLC: 171 MG/DL
GLUCOSE, GLC: 171 MG/DL
GLUCOSE, GLC: 172 MG/DL
GLUCOSE, GLC: 172 MG/DL
GLUCOSE, GLC: 173 MG/DL
GLUCOSE, GLC: 174 MG/DL
GLUCOSE, GLC: 175 MG/DL
GLUCOSE, GLC: 178 MG/DL
GLUCOSE, GLC: 180 MG/DL
GLUCOSE, GLC: 181 MG/DL
GLUCOSE, GLC: 181 MG/DL
GLUCOSE, GLC: 183 MG/DL
GLUCOSE, GLC: 185 MG/DL
GLUCOSE, GLC: 187 MG/DL
GLUCOSE, GLC: 191 MG/DL
GLUCOSE, GLC: 192 MG/DL
GLUCOSE, GLC: 196 MG/DL
GLUCOSE, GLC: 197 MG/DL
GLUCOSE, GLC: 198 MG/DL
GLUCOSE, GLC: 200 MG/DL
GLUCOSE, GLC: 202 MG/DL
GLUCOSE, GLC: 204 MG/DL
GLUCOSE, GLC: 219 MG/DL
GLUCOSE, GLC: 228 MG/DL
GLUCOSE, GLC: 231 MG/DL
GLUCOSE, GLC: 236 MG/DL
GLUCOSE, GLC: 238 MG/DL
GLUCOSE, GLC: 262 MG/DL
GLUCOSE, GLC: 282 MG/DL
GLUCOSE, GLC: 298 MG/DL
GLUCOSE, GLC: 350 MG/DL
GLUCOSE, GLC: 463 MG/DL
GLUCOSE, GLC: 498 MG/DL
GLUCOSE, GLC: 513 MG/DL
GRAM STN SPEC: NORMAL
HBA1C MFR BLD: 7.3 % (ref 4–5.6)
HCO3 BLD-SCNC: 21.9 MMOL/L (ref 22–26)
HCO3 BLDA-SCNC: 21 MMOL/L (ref 22–26)
HCO3 BLDA-SCNC: 22 MMOL/L (ref 22–26)
HCO3 BLDA-SCNC: 23 MMOL/L (ref 22–26)
HCO3 BLDA-SCNC: 24 MMOL/L (ref 22–26)
HCO3 BLDA-SCNC: 24 MMOL/L (ref 22–26)
HCO3 BLDA-SCNC: 26 MMOL/L (ref 22–26)
HCO3 BLDA-SCNC: 26 MMOL/L (ref 22–26)
HCT VFR BLD AUTO: 19.2 % (ref 35–47)
HCT VFR BLD AUTO: 19.5 % (ref 35–47)
HCT VFR BLD AUTO: 19.8 % (ref 35–47)
HCT VFR BLD AUTO: 20.4 % (ref 35–47)
HCT VFR BLD AUTO: 20.7 % (ref 35–47)
HCT VFR BLD AUTO: 21 % (ref 35–47)
HCT VFR BLD AUTO: 21.6 % (ref 35–47)
HCT VFR BLD AUTO: 21.7 % (ref 35–47)
HCT VFR BLD AUTO: 22.3 % (ref 35–47)
HCT VFR BLD AUTO: 22.4 % (ref 35–47)
HCT VFR BLD AUTO: 22.7 % (ref 34–46.6)
HCT VFR BLD AUTO: 22.7 % (ref 35–47)
HCT VFR BLD AUTO: 23.6 % (ref 35–47)
HCT VFR BLD AUTO: 23.7 % (ref 35–47)
HCT VFR BLD AUTO: 24 % (ref 35–47)
HCT VFR BLD AUTO: 24.6 % (ref 35–47)
HCT VFR BLD AUTO: 25.3 % (ref 35–47)
HCT VFR BLD AUTO: 25.5 % (ref 35–47)
HCT VFR BLD AUTO: 25.8 % (ref 35–47)
HCT VFR BLD AUTO: 26.1 % (ref 35–47)
HCT VFR BLD AUTO: 26.2 % (ref 35–47)
HCT VFR BLD AUTO: 26.8 % (ref 35–47)
HCT VFR BLD AUTO: 27.2 % (ref 35–47)
HCT VFR BLD AUTO: 28.4 % (ref 35–47)
HGB BLD-MCNC: 5.9 G/DL (ref 11.5–16)
HGB BLD-MCNC: 6 G/DL (ref 11.5–16)
HGB BLD-MCNC: 6.2 G/DL (ref 11.5–16)
HGB BLD-MCNC: 6.3 G/DL (ref 11.5–16)
HGB BLD-MCNC: 6.3 G/DL (ref 11.5–16)
HGB BLD-MCNC: 6.4 G/DL (ref 11.5–16)
HGB BLD-MCNC: 6.4 G/DL (ref 11.5–16)
HGB BLD-MCNC: 6.5 G/DL (ref 11.5–16)
HGB BLD-MCNC: 6.6 G/DL (ref 11.5–16)
HGB BLD-MCNC: 6.6 G/DL (ref 11.5–16)
HGB BLD-MCNC: 6.8 G/DL (ref 11.5–16)
HGB BLD-MCNC: 6.9 G/DL (ref 11.5–16)
HGB BLD-MCNC: 7 G/DL (ref 11.5–16)
HGB BLD-MCNC: 7 G/DL (ref 11.5–16)
HGB BLD-MCNC: 7.1 G/DL (ref 11.5–16)
HGB BLD-MCNC: 7.2 G/DL (ref 11.1–15.9)
HGB BLD-MCNC: 7.4 G/DL (ref 11.5–16)
HGB BLD-MCNC: 7.5 G/DL (ref 11.5–16)
HGB BLD-MCNC: 7.6 G/DL (ref 11.5–16)
HGB BLD-MCNC: 7.7 G/DL (ref 11.5–16)
HGB BLD-MCNC: 7.7 G/DL (ref 11.5–16)
HGB BLD-MCNC: 8 G/DL (ref 11.5–16)
HGB BLD-MCNC: 8.1 G/DL (ref 11.5–16)
HGB BLD-MCNC: 8.2 G/DL (ref 11.5–16)
HGB BLD-MCNC: 8.2 G/DL (ref 11.5–16)
HGB BLD-MCNC: 8.5 G/DL (ref 11.5–16)
HGB BLD-MCNC: 8.8 G/DL (ref 11.5–16)
HGB BLD-MCNC: 8.8 G/DL (ref 11.5–16)
HGB UR QL STRIP: ABNORMAL
HIGH TARGET, HITG: 250 MG/DL
HISTORY CHECKED?,CKHIST: NORMAL
HYALINE CASTS URNS QL MICRO: ABNORMAL /LPF (ref 0–5)
IMM GRANULOCYTES # BLD AUTO: 0 K/UL
IMM GRANULOCYTES # BLD AUTO: 0 K/UL
IMM GRANULOCYTES # BLD AUTO: 0 K/UL (ref 0–0.04)
IMM GRANULOCYTES # BLD AUTO: 0.1 K/UL (ref 0–0.04)
IMM GRANULOCYTES # BLD AUTO: 0.2 K/UL (ref 0–0.04)
IMM GRANULOCYTES # BLD AUTO: 0.2 X10E3/UL (ref 0–0.1)
IMM GRANULOCYTES # BLD AUTO: 0.3 K/UL (ref 0–0.04)
IMM GRANULOCYTES NFR BLD AUTO: 0 %
IMM GRANULOCYTES NFR BLD AUTO: 0 %
IMM GRANULOCYTES NFR BLD AUTO: 0 % (ref 0–0.5)
IMM GRANULOCYTES NFR BLD AUTO: 0 % (ref 0–0.5)
IMM GRANULOCYTES NFR BLD AUTO: 1 % (ref 0–0.5)
IMM GRANULOCYTES NFR BLD AUTO: 2 % (ref 0–0.5)
IMM GRANULOCYTES NFR BLD AUTO: 3 % (ref 0–0.5)
IMM GRANULOCYTES NFR BLD AUTO: 4 %
IMM GRANULOCYTES NFR BLD AUTO: 4 % (ref 0–0.5)
INSULIN ADMINSTERED, INSADM: 11.6 UNITS/HOUR
INSULIN ADMINSTERED, INSADM: 11.9 UNITS/HOUR
INSULIN ADMINSTERED, INSADM: 13.1 UNITS/HOUR
INSULIN ADMINSTERED, INSADM: 13.3 UNITS/HOUR
INSULIN ADMINSTERED, INSADM: 14.1 UNITS/HOUR
INSULIN ADMINSTERED, INSADM: 16.1 UNITS/HOUR
INSULIN ADMINSTERED, INSADM: 2.2 UNITS/HOUR
INSULIN ADMINSTERED, INSADM: 2.4 UNITS/HOUR
INSULIN ADMINSTERED, INSADM: 2.6 UNITS/HOUR
INSULIN ADMINSTERED, INSADM: 2.8 UNITS/HOUR
INSULIN ADMINSTERED, INSADM: 2.8 UNITS/HOUR
INSULIN ADMINSTERED, INSADM: 3.3 UNITS/HOUR
INSULIN ADMINSTERED, INSADM: 3.4 UNITS/HOUR
INSULIN ADMINSTERED, INSADM: 3.4 UNITS/HOUR
INSULIN ADMINSTERED, INSADM: 3.6 UNITS/HOUR
INSULIN ADMINSTERED, INSADM: 3.6 UNITS/HOUR
INSULIN ADMINSTERED, INSADM: 3.8 UNITS/HOUR
INSULIN ADMINSTERED, INSADM: 3.9 UNITS/HOUR
INSULIN ADMINSTERED, INSADM: 4 UNITS/HOUR
INSULIN ADMINSTERED, INSADM: 4 UNITS/HOUR
INSULIN ADMINSTERED, INSADM: 4.2 UNITS/HOUR
INSULIN ADMINSTERED, INSADM: 4.5 UNITS/HOUR
INSULIN ADMINSTERED, INSADM: 4.6 UNITS/HOUR
INSULIN ADMINSTERED, INSADM: 4.7 UNITS/HOUR
INSULIN ADMINSTERED, INSADM: 4.8 UNITS/HOUR
INSULIN ADMINSTERED, INSADM: 4.9 UNITS/HOUR
INSULIN ADMINSTERED, INSADM: 5 UNITS/HOUR
INSULIN ADMINSTERED, INSADM: 5.1 UNITS/HOUR
INSULIN ADMINSTERED, INSADM: 5.1 UNITS/HOUR
INSULIN ADMINSTERED, INSADM: 5.3 UNITS/HOUR
INSULIN ADMINSTERED, INSADM: 5.3 UNITS/HOUR
INSULIN ADMINSTERED, INSADM: 5.4 UNITS/HOUR
INSULIN ADMINSTERED, INSADM: 5.5 UNITS/HOUR
INSULIN ADMINSTERED, INSADM: 5.6 UNITS/HOUR
INSULIN ADMINSTERED, INSADM: 5.7 UNITS/HOUR
INSULIN ADMINSTERED, INSADM: 5.9 UNITS/HOUR
INSULIN ADMINSTERED, INSADM: 6.1 UNITS/HOUR
INSULIN ADMINSTERED, INSADM: 6.2 UNITS/HOUR
INSULIN ADMINSTERED, INSADM: 6.3 UNITS/HOUR
INSULIN ADMINSTERED, INSADM: 6.4 UNITS/HOUR
INSULIN ADMINSTERED, INSADM: 6.4 UNITS/HOUR
INSULIN ADMINSTERED, INSADM: 6.5 UNITS/HOUR
INSULIN ADMINSTERED, INSADM: 6.6 UNITS/HOUR
INSULIN ADMINSTERED, INSADM: 6.7 UNITS/HOUR
INSULIN ADMINSTERED, INSADM: 7.2 UNITS/HOUR
INSULIN ADMINSTERED, INSADM: 7.6 UNITS/HOUR
INSULIN ADMINSTERED, INSADM: 7.7 UNITS/HOUR
INSULIN ADMINSTERED, INSADM: 7.9 UNITS/HOUR
INSULIN ADMINSTERED, INSADM: 8 UNITS/HOUR
INSULIN ADMINSTERED, INSADM: 8.3 UNITS/HOUR
INSULIN ADMINSTERED, INSADM: 9.1 UNITS/HOUR
INSULIN ADMINSTERED, INSADM: 9.7 UNITS/HOUR
INSULIN ORDER, INSORD: 11.6 UNITS/HOUR
INSULIN ORDER, INSORD: 11.9 UNITS/HOUR
INSULIN ORDER, INSORD: 13.1 UNITS/HOUR
INSULIN ORDER, INSORD: 13.3 UNITS/HOUR
INSULIN ORDER, INSORD: 14.1 UNITS/HOUR
INSULIN ORDER, INSORD: 16.1 UNITS/HOUR
INSULIN ORDER, INSORD: 2.2 UNITS/HOUR
INSULIN ORDER, INSORD: 2.4 UNITS/HOUR
INSULIN ORDER, INSORD: 2.6 UNITS/HOUR
INSULIN ORDER, INSORD: 2.8 UNITS/HOUR
INSULIN ORDER, INSORD: 2.8 UNITS/HOUR
INSULIN ORDER, INSORD: 3.3 UNITS/HOUR
INSULIN ORDER, INSORD: 3.4 UNITS/HOUR
INSULIN ORDER, INSORD: 3.4 UNITS/HOUR
INSULIN ORDER, INSORD: 3.6 UNITS/HOUR
INSULIN ORDER, INSORD: 3.6 UNITS/HOUR
INSULIN ORDER, INSORD: 3.8 UNITS/HOUR
INSULIN ORDER, INSORD: 3.9 UNITS/HOUR
INSULIN ORDER, INSORD: 4 UNITS/HOUR
INSULIN ORDER, INSORD: 4 UNITS/HOUR
INSULIN ORDER, INSORD: 4.2 UNITS/HOUR
INSULIN ORDER, INSORD: 4.5 UNITS/HOUR
INSULIN ORDER, INSORD: 4.6 UNITS/HOUR
INSULIN ORDER, INSORD: 4.7 UNITS/HOUR
INSULIN ORDER, INSORD: 4.8 UNITS/HOUR
INSULIN ORDER, INSORD: 4.9 UNITS/HOUR
INSULIN ORDER, INSORD: 5 UNITS/HOUR
INSULIN ORDER, INSORD: 5.1 UNITS/HOUR
INSULIN ORDER, INSORD: 5.1 UNITS/HOUR
INSULIN ORDER, INSORD: 5.3 UNITS/HOUR
INSULIN ORDER, INSORD: 5.3 UNITS/HOUR
INSULIN ORDER, INSORD: 5.4 UNITS/HOUR
INSULIN ORDER, INSORD: 5.5 UNITS/HOUR
INSULIN ORDER, INSORD: 5.6 UNITS/HOUR
INSULIN ORDER, INSORD: 5.7 UNITS/HOUR
INSULIN ORDER, INSORD: 5.9 UNITS/HOUR
INSULIN ORDER, INSORD: 6.1 UNITS/HOUR
INSULIN ORDER, INSORD: 6.2 UNITS/HOUR
INSULIN ORDER, INSORD: 6.3 UNITS/HOUR
INSULIN ORDER, INSORD: 6.4 UNITS/HOUR
INSULIN ORDER, INSORD: 6.4 UNITS/HOUR
INSULIN ORDER, INSORD: 6.5 UNITS/HOUR
INSULIN ORDER, INSORD: 6.6 UNITS/HOUR
INSULIN ORDER, INSORD: 6.7 UNITS/HOUR
INSULIN ORDER, INSORD: 7.2 UNITS/HOUR
INSULIN ORDER, INSORD: 7.6 UNITS/HOUR
INSULIN ORDER, INSORD: 7.7 UNITS/HOUR
INSULIN ORDER, INSORD: 7.9 UNITS/HOUR
INSULIN ORDER, INSORD: 8 UNITS/HOUR
INSULIN ORDER, INSORD: 8.3 UNITS/HOUR
INSULIN ORDER, INSORD: 9.1 UNITS/HOUR
INSULIN ORDER, INSORD: 9.7 UNITS/HOUR
IPAP/PIP, IPAPIP: 20
IPAP/PIP, IPAPIP: 20
IRON SATN MFR SERPL: 22 % (ref 20–50)
IRON SERPL-MCNC: 65 UG/DL (ref 35–150)
KETONES UR QL STRIP.AUTO: ABNORMAL MG/DL
KETONES UR QL STRIP.AUTO: NEGATIVE MG/DL
KETONES UR QL STRIP.AUTO: NEGATIVE MG/DL
LACTATE BLD-SCNC: 0.86 MMOL/L (ref 0.4–2)
LACTATE BLD-SCNC: 3.14 MMOL/L (ref 0.4–2)
LACTATE SERPL-SCNC: 1.3 MMOL/L (ref 0.4–2)
LDH SERPL L TO P-CCNC: 532 U/L (ref 81–246)
LDH SERPL L TO P-CCNC: 582 U/L (ref 81–246)
LDH SERPL L TO P-CCNC: 634 U/L (ref 81–246)
LDH SERPL L TO P-CCNC: 636 U/L (ref 81–246)
LDH SERPL L TO P-CCNC: 729 U/L (ref 81–246)
LDH SERPL L TO P-CCNC: 737 U/L (ref 81–246)
LDH SERPL L TO P-CCNC: 737 U/L (ref 81–246)
LDH SERPL L TO P-CCNC: 750 U/L (ref 81–246)
LDH SERPL L TO P-CCNC: 775 U/L (ref 81–246)
LDH SERPL L TO P-CCNC: 781 U/L (ref 81–246)
LDH SERPL L TO P-CCNC: 787 U/L (ref 81–246)
LEUKOCYTE ESTERASE UR QL STRIP.AUTO: ABNORMAL
LOW TARGET, LOT: 150 MG/DL
LYMPHOCYTES # BLD AUTO: 1.1 X10E3/UL (ref 0.7–3.1)
LYMPHOCYTES # BLD: 0.8 K/UL (ref 0.8–3.5)
LYMPHOCYTES # BLD: 0.8 K/UL (ref 0.8–3.5)
LYMPHOCYTES # BLD: 0.9 K/UL (ref 0.8–3.5)
LYMPHOCYTES # BLD: 1.1 K/UL (ref 0.8–3.5)
LYMPHOCYTES # BLD: 1.2 K/UL (ref 0.8–3.5)
LYMPHOCYTES # BLD: 1.2 K/UL (ref 0.8–3.5)
LYMPHOCYTES # BLD: 1.5 K/UL (ref 0.8–3.5)
LYMPHOCYTES # BLD: 2.5 K/UL (ref 0.8–3.5)
LYMPHOCYTES NFR BLD AUTO: 23 %
LYMPHOCYTES NFR BLD: 13 % (ref 12–49)
LYMPHOCYTES NFR BLD: 13 % (ref 12–49)
LYMPHOCYTES NFR BLD: 14 % (ref 12–49)
LYMPHOCYTES NFR BLD: 14 % (ref 12–49)
LYMPHOCYTES NFR BLD: 15 % (ref 12–49)
LYMPHOCYTES NFR BLD: 17 % (ref 12–49)
LYMPHOCYTES NFR BLD: 19 % (ref 12–49)
LYMPHOCYTES NFR BLD: 25 % (ref 12–49)
LYMPHOCYTES NFR BLD: 27 % (ref 12–49)
LYMPHOCYTES NFR BLD: 37 % (ref 12–49)
LYMPHOCYTES NFR BLD: 5 % (ref 12–49)
MAGNESIUM SERPL-MCNC: 1.6 MG/DL (ref 1.6–2.4)
MAGNESIUM SERPL-MCNC: 1.7 MG/DL (ref 1.6–2.4)
MAGNESIUM SERPL-MCNC: 1.8 MG/DL (ref 1.6–2.4)
MAGNESIUM SERPL-MCNC: 1.8 MG/DL (ref 1.6–2.4)
MAGNESIUM SERPL-MCNC: 1.9 MG/DL (ref 1.6–2.4)
MAGNESIUM SERPL-MCNC: 2.1 MG/DL (ref 1.6–2.4)
MAGNESIUM SERPL-MCNC: 2.2 MG/DL (ref 1.6–2.4)
MAGNESIUM SERPL-MCNC: 2.3 MG/DL (ref 1.6–2.4)
MAGNESIUM SERPL-MCNC: 2.4 MG/DL (ref 1.6–2.4)
MAGNESIUM SERPL-MCNC: 2.4 MG/DL (ref 1.6–2.4)
MAGNESIUM SERPL-MCNC: 2.5 MG/DL (ref 1.6–2.4)
MAGNESIUM SERPL-MCNC: 2.6 MG/DL (ref 1.6–2.4)
MCH RBC QN AUTO: 28.2 PG (ref 26–34)
MCH RBC QN AUTO: 28.4 PG (ref 26–34)
MCH RBC QN AUTO: 28.5 PG (ref 26–34)
MCH RBC QN AUTO: 28.6 PG (ref 26–34)
MCH RBC QN AUTO: 28.7 PG (ref 26–34)
MCH RBC QN AUTO: 28.8 PG (ref 26–34)
MCH RBC QN AUTO: 28.9 PG (ref 26.6–33)
MCH RBC QN AUTO: 28.9 PG (ref 26–34)
MCH RBC QN AUTO: 28.9 PG (ref 26–34)
MCH RBC QN AUTO: 29 PG (ref 26–34)
MCH RBC QN AUTO: 29.1 PG (ref 26–34)
MCH RBC QN AUTO: 29.2 PG (ref 26–34)
MCH RBC QN AUTO: 29.3 PG (ref 26–34)
MCH RBC QN AUTO: 29.5 PG (ref 26–34)
MCH RBC QN AUTO: 29.6 PG (ref 26–34)
MCH RBC QN AUTO: 29.9 PG (ref 26–34)
MCH RBC QN AUTO: 30.2 PG (ref 26–34)
MCH RBC QN AUTO: 30.7 PG (ref 26–34)
MCHC RBC AUTO-ENTMCNC: 30.3 G/DL (ref 30–36.5)
MCHC RBC AUTO-ENTMCNC: 30.4 G/DL (ref 30–36.5)
MCHC RBC AUTO-ENTMCNC: 30.4 G/DL (ref 30–36.5)
MCHC RBC AUTO-ENTMCNC: 30.5 G/DL (ref 30–36.5)
MCHC RBC AUTO-ENTMCNC: 30.6 G/DL (ref 30–36.5)
MCHC RBC AUTO-ENTMCNC: 30.8 G/DL (ref 30–36.5)
MCHC RBC AUTO-ENTMCNC: 30.9 G/DL (ref 30–36.5)
MCHC RBC AUTO-ENTMCNC: 30.9 G/DL (ref 30–36.5)
MCHC RBC AUTO-ENTMCNC: 31 G/DL (ref 30–36.5)
MCHC RBC AUTO-ENTMCNC: 31 G/DL (ref 30–36.5)
MCHC RBC AUTO-ENTMCNC: 31.3 G/DL (ref 30–36.5)
MCHC RBC AUTO-ENTMCNC: 31.4 G/DL (ref 30–36.5)
MCHC RBC AUTO-ENTMCNC: 31.6 G/DL (ref 30–36.5)
MCHC RBC AUTO-ENTMCNC: 31.7 G/DL (ref 30–36.5)
MCHC RBC AUTO-ENTMCNC: 31.7 G/DL (ref 31.5–35.7)
MCHC RBC AUTO-ENTMCNC: 31.8 G/DL (ref 30–36.5)
MCHC RBC AUTO-ENTMCNC: 31.8 G/DL (ref 30–36.5)
MCHC RBC AUTO-ENTMCNC: 32.4 G/DL (ref 30–36.5)
MCHC RBC AUTO-ENTMCNC: 32.6 G/DL (ref 30–36.5)
MCV RBC AUTO: 88.9 FL (ref 80–99)
MCV RBC AUTO: 89.7 FL (ref 80–99)
MCV RBC AUTO: 90.5 FL (ref 80–99)
MCV RBC AUTO: 91 FL (ref 79–97)
MCV RBC AUTO: 91 FL (ref 80–99)
MCV RBC AUTO: 91 FL (ref 80–99)
MCV RBC AUTO: 91.2 FL (ref 80–99)
MCV RBC AUTO: 91.3 FL (ref 80–99)
MCV RBC AUTO: 91.8 FL (ref 80–99)
MCV RBC AUTO: 91.9 FL (ref 80–99)
MCV RBC AUTO: 92.3 FL (ref 80–99)
MCV RBC AUTO: 92.5 FL (ref 80–99)
MCV RBC AUTO: 92.5 FL (ref 80–99)
MCV RBC AUTO: 92.8 FL (ref 80–99)
MCV RBC AUTO: 93.1 FL (ref 80–99)
MCV RBC AUTO: 93.7 FL (ref 80–99)
MCV RBC AUTO: 94.1 FL (ref 80–99)
MCV RBC AUTO: 94.4 FL (ref 80–99)
MCV RBC AUTO: 94.6 FL (ref 80–99)
MCV RBC AUTO: 95.1 FL (ref 80–99)
MCV RBC AUTO: 95.8 FL (ref 80–99)
MCV RBC AUTO: 96 FL (ref 80–99)
MCV RBC AUTO: 96 FL (ref 80–99)
MCV RBC AUTO: 97.2 FL (ref 80–99)
MCV RBC AUTO: 98 FL (ref 80–99)
METAMYELOCYTES NFR BLD MANUAL: 1 %
METAMYELOCYTES NFR BLD MANUAL: 1 %
METAMYELOCYTES NFR BLD MANUAL: 3 %
MINUTES UNTIL NEXT BG, NBG: 120 MIN
MINUTES UNTIL NEXT BG, NBG: 60 MIN
MONOCYTES # BLD AUTO: 0.5 X10E3/UL (ref 0.1–0.9)
MONOCYTES # BLD: 0.1 K/UL (ref 0–1)
MONOCYTES # BLD: 0.3 K/UL (ref 0–1)
MONOCYTES # BLD: 0.5 K/UL (ref 0–1)
MONOCYTES # BLD: 0.6 K/UL (ref 0–1)
MONOCYTES # BLD: 0.6 K/UL (ref 0–1)
MONOCYTES # BLD: 0.7 K/UL (ref 0–1)
MONOCYTES # BLD: 0.9 K/UL (ref 0–1)
MONOCYTES # BLD: 1.1 K/UL (ref 0–1)
MONOCYTES # BLD: 1.1 K/UL (ref 0–1)
MONOCYTES # BLD: 2.5 K/UL (ref 0–1)
MONOCYTES NFR BLD AUTO: 11 %
MONOCYTES NFR BLD: 13 % (ref 5–13)
MONOCYTES NFR BLD: 13 % (ref 5–13)
MONOCYTES NFR BLD: 15 % (ref 5–13)
MONOCYTES NFR BLD: 15 % (ref 5–13)
MONOCYTES NFR BLD: 17 % (ref 5–13)
MONOCYTES NFR BLD: 18 % (ref 5–13)
MONOCYTES NFR BLD: 2 % (ref 5–13)
MONOCYTES NFR BLD: 3 % (ref 5–13)
MONOCYTES NFR BLD: 5 % (ref 5–13)
MONOCYTES NFR BLD: 7 % (ref 5–13)
MONOCYTES NFR BLD: 9 % (ref 5–13)
MULTIPLIER, MUL: 0.02
MULTIPLIER, MUL: 0.03
MULTIPLIER, MUL: 0.04
MULTIPLIER, MUL: 0.05
MULTIPLIER, MUL: 0.06
MULTIPLIER, MUL: 0.06
MULTIPLIER, MUL: 0.07
MYELOCYTES NFR BLD MANUAL: 1 %
MYELOCYTES NFR BLD MANUAL: 2 %
NEUTROPHILS # BLD AUTO: 2.7 X10E3/UL (ref 1.4–7)
NEUTROPHILS NFR BLD AUTO: 61 %
NEUTS BAND NFR BLD MANUAL: 1 %
NEUTS BAND NFR BLD MANUAL: 1 % (ref 0–6)
NEUTS BAND NFR BLD MANUAL: 12 %
NEUTS BAND NFR BLD MANUAL: 3 % (ref 0–6)
NEUTS SEG # BLD: 1.2 K/UL (ref 1.8–8)
NEUTS SEG # BLD: 1.7 K/UL (ref 1.8–8)
NEUTS SEG # BLD: 2.3 K/UL (ref 1.8–8)
NEUTS SEG # BLD: 3.8 K/UL (ref 1.8–8)
NEUTS SEG # BLD: 4.1 K/UL (ref 1.8–8)
NEUTS SEG # BLD: 43.3 K/UL (ref 1.8–8)
NEUTS SEG # BLD: 5.2 K/UL (ref 1.8–8)
NEUTS SEG # BLD: 5.4 K/UL (ref 1.8–8)
NEUTS SEG # BLD: 5.5 K/UL (ref 1.8–8)
NEUTS SEG # BLD: 5.5 K/UL (ref 1.8–8)
NEUTS SEG # BLD: 5.6 K/UL (ref 1.8–8)
NEUTS SEG # BLD: 6.4 K/UL (ref 1.8–8)
NEUTS SEG # BLD: 6.8 K/UL (ref 1.8–8)
NEUTS SEG NFR BLD: 42 % (ref 32–75)
NEUTS SEG NFR BLD: 51 % (ref 32–75)
NEUTS SEG NFR BLD: 54 % (ref 32–75)
NEUTS SEG NFR BLD: 62 % (ref 32–75)
NEUTS SEG NFR BLD: 64 % (ref 32–75)
NEUTS SEG NFR BLD: 66 % (ref 32–75)
NEUTS SEG NFR BLD: 68 % (ref 32–75)
NEUTS SEG NFR BLD: 68 % (ref 32–75)
NEUTS SEG NFR BLD: 69 % (ref 32–75)
NEUTS SEG NFR BLD: 74 % (ref 32–75)
NEUTS SEG NFR BLD: 75 % (ref 32–75)
NEUTS SEG NFR BLD: 78 % (ref 32–75)
NEUTS SEG NFR BLD: 83 % (ref 32–75)
NITRITE UR QL STRIP.AUTO: NEGATIVE
NRBC # BLD: 0 K/UL (ref 0–0.01)
NRBC # BLD: 0.02 K/UL (ref 0–0.01)
NRBC # BLD: 0.02 K/UL (ref 0–0.01)
NRBC # BLD: 0.03 K/UL (ref 0–0.01)
NRBC # BLD: 0.03 K/UL (ref 0–0.01)
NRBC # BLD: 0.05 K/UL (ref 0–0.01)
NRBC # BLD: 0.06 K/UL (ref 0–0.01)
NRBC # BLD: 0.07 K/UL (ref 0–0.01)
NRBC # BLD: 0.08 K/UL (ref 0–0.01)
NRBC # BLD: 0.09 K/UL (ref 0–0.01)
NRBC # BLD: 0.1 K/UL (ref 0–0.01)
NRBC # BLD: 0.12 K/UL (ref 0–0.01)
NRBC # BLD: 0.17 K/UL (ref 0–0.01)
NRBC # BLD: 0.19 K/UL (ref 0–0.01)
NRBC # BLD: 0.3 K/UL (ref 0–0.01)
NRBC # BLD: 0.36 K/UL (ref 0–0.01)
NRBC # BLD: 1.17 K/UL (ref 0–0.01)
NRBC # BLD: 2.29 K/UL (ref 0–0.01)
NRBC # BLD: 2.7 K/UL (ref 0–0.01)
NRBC BLD AUTO-RTO: 1 % (ref 0–0)
NRBC BLD-RTO: 0 PER 100 WBC
NRBC BLD-RTO: 0.3 PER 100 WBC
NRBC BLD-RTO: 0.4 PER 100 WBC
NRBC BLD-RTO: 0.4 PER 100 WBC
NRBC BLD-RTO: 0.6 PER 100 WBC
NRBC BLD-RTO: 0.8 PER 100 WBC
NRBC BLD-RTO: 0.9 PER 100 WBC
NRBC BLD-RTO: 1 PER 100 WBC
NRBC BLD-RTO: 1 PER 100 WBC
NRBC BLD-RTO: 1.1 PER 100 WBC
NRBC BLD-RTO: 1.3 PER 100 WBC
NRBC BLD-RTO: 1.5 PER 100 WBC
NRBC BLD-RTO: 1.5 PER 100 WBC
NRBC BLD-RTO: 1.8 PER 100 WBC
NRBC BLD-RTO: 1.9 PER 100 WBC
NRBC BLD-RTO: 1.9 PER 100 WBC
NRBC BLD-RTO: 2.5 PER 100 WBC
NRBC BLD-RTO: 4.1 PER 100 WBC
NRBC BLD-RTO: 4.1 PER 100 WBC
NRBC BLD-RTO: 5.4 PER 100 WBC
NRBC BLD-RTO: 5.8 PER 100 WBC
O2/TOTAL GAS SETTING VFR VENT: 100 %
ORDER INITIALS, ORDINIT: NORMAL
P-R INTERVAL, ECG05: 116 MS
P-R INTERVAL, ECG05: 126 MS
PCO2 BLD: 29 MMHG (ref 35–45)
PCO2 BLDA: 33 MMHG (ref 35–45)
PCO2 BLDA: 36 MMHG (ref 35–45)
PCO2 BLDA: 37 MMHG (ref 35–45)
PCO2 BLDA: 47 MMHG (ref 35–45)
PCO2 BLDA: 54 MMHG (ref 35–45)
PCO2 BLDA: 54 MMHG (ref 35–45)
PCO2 BLDA: 62 MMHG (ref 35–45)
PEEP RESPIRATORY: 12 CM[H2O]
PEEP RESPIRATORY: 15 CM[H2O]
PEEP RESPIRATORY: 15 CM[H2O]
PH BLD: 7.49 [PH] (ref 7.35–7.45)
PH BLDA: 7.2 [PH] (ref 7.35–7.45)
PH BLDA: 7.21 [PH] (ref 7.35–7.45)
PH BLDA: 7.23 [PH] (ref 7.35–7.45)
PH BLDA: 7.31 [PH] (ref 7.35–7.45)
PH BLDA: 7.48 [PH] (ref 7.35–7.45)
PH UR STRIP: 5.5 [PH] (ref 5–8)
PH UR STRIP: 5.5 [PH] (ref 5–8)
PH UR STRIP: 6 [PH] (ref 5–8)
PHOSPHATE SERPL-MCNC: 10.7 MG/DL (ref 2.6–4.7)
PHOSPHATE SERPL-MCNC: 2.1 MG/DL (ref 2.6–4.7)
PHOSPHATE SERPL-MCNC: 2.3 MG/DL (ref 2.6–4.7)
PHOSPHATE SERPL-MCNC: 2.5 MG/DL (ref 2.6–4.7)
PHOSPHATE SERPL-MCNC: 2.6 MG/DL (ref 2.6–4.7)
PHOSPHATE SERPL-MCNC: 3 MG/DL (ref 2.6–4.7)
PHOSPHATE SERPL-MCNC: 3.3 MG/DL (ref 2.6–4.7)
PHOSPHATE SERPL-MCNC: 6.2 MG/DL (ref 2.6–4.7)
PHOSPHATE SERPL-MCNC: 8 MG/DL (ref 2.6–4.7)
PHOSPHATE SERPL-MCNC: 9 MG/DL (ref 2.6–4.7)
PLATELET # BLD AUTO: 103 K/UL (ref 150–400)
PLATELET # BLD AUTO: 113 K/UL (ref 150–400)
PLATELET # BLD AUTO: 162 K/UL (ref 150–400)
PLATELET # BLD AUTO: 167 K/UL (ref 150–400)
PLATELET # BLD AUTO: 174 K/UL (ref 150–400)
PLATELET # BLD AUTO: 175 K/UL (ref 150–400)
PLATELET # BLD AUTO: 176 K/UL (ref 150–400)
PLATELET # BLD AUTO: 176 X10E3/UL (ref 150–450)
PLATELET # BLD AUTO: 181 K/UL (ref 150–400)
PLATELET # BLD AUTO: 187 K/UL (ref 150–400)
PLATELET # BLD AUTO: 187 K/UL (ref 150–400)
PLATELET # BLD AUTO: 190 K/UL (ref 150–400)
PLATELET # BLD AUTO: 190 K/UL (ref 150–400)
PLATELET # BLD AUTO: 194 K/UL (ref 150–400)
PLATELET # BLD AUTO: 202 K/UL (ref 150–400)
PLATELET # BLD AUTO: 210 K/UL (ref 150–400)
PLATELET # BLD AUTO: 212 K/UL (ref 150–400)
PLATELET # BLD AUTO: 241 K/UL (ref 150–400)
PLATELET # BLD AUTO: 252 K/UL (ref 150–400)
PLATELET # BLD AUTO: 253 K/UL (ref 150–400)
PLATELET # BLD AUTO: 257 K/UL (ref 150–400)
PLATELET # BLD AUTO: 258 K/UL (ref 150–400)
PLATELET # BLD AUTO: 264 K/UL (ref 150–400)
PMV BLD AUTO: 10 FL (ref 8.9–12.9)
PMV BLD AUTO: 10.1 FL (ref 8.9–12.9)
PMV BLD AUTO: 10.1 FL (ref 8.9–12.9)
PMV BLD AUTO: 10.2 FL (ref 8.9–12.9)
PMV BLD AUTO: 10.3 FL (ref 8.9–12.9)
PMV BLD AUTO: 10.4 FL (ref 8.9–12.9)
PMV BLD AUTO: 10.5 FL (ref 8.9–12.9)
PMV BLD AUTO: 10.5 FL (ref 8.9–12.9)
PMV BLD AUTO: 10.7 FL (ref 8.9–12.9)
PMV BLD AUTO: 10.7 FL (ref 8.9–12.9)
PMV BLD AUTO: 11.1 FL (ref 8.9–12.9)
PMV BLD AUTO: 11.2 FL (ref 8.9–12.9)
PMV BLD AUTO: 12 FL (ref 8.9–12.9)
PMV BLD AUTO: 9.1 FL (ref 8.9–12.9)
PMV BLD AUTO: 9.2 FL (ref 8.9–12.9)
PMV BLD AUTO: 9.4 FL (ref 8.9–12.9)
PMV BLD AUTO: 9.4 FL (ref 8.9–12.9)
PMV BLD AUTO: 9.6 FL (ref 8.9–12.9)
PMV BLD AUTO: 9.7 FL (ref 8.9–12.9)
PMV BLD AUTO: 9.8 FL (ref 8.9–12.9)
PO2 BLD: 58 MMHG (ref 80–100)
PO2 BLDA: 106 MMHG (ref 80–100)
PO2 BLDA: 116 MMHG (ref 80–100)
PO2 BLDA: 46 MMHG (ref 80–100)
PO2 BLDA: 46 MMHG (ref 80–100)
PO2 BLDA: 49 MMHG (ref 80–100)
PO2 BLDA: 50 MMHG (ref 80–100)
PO2 BLDA: 79 MMHG (ref 80–100)
POTASSIUM SERPL-SCNC: 3.6 MMOL/L (ref 3.5–5.1)
POTASSIUM SERPL-SCNC: 3.7 MMOL/L (ref 3.5–5.1)
POTASSIUM SERPL-SCNC: 3.8 MMOL/L (ref 3.5–5.1)
POTASSIUM SERPL-SCNC: 3.8 MMOL/L (ref 3.5–5.1)
POTASSIUM SERPL-SCNC: 3.9 MMOL/L (ref 3.5–5.1)
POTASSIUM SERPL-SCNC: 4 MMOL/L (ref 3.5–5.1)
POTASSIUM SERPL-SCNC: 4.1 MMOL/L (ref 3.5–5.1)
POTASSIUM SERPL-SCNC: 4.1 MMOL/L (ref 3.5–5.1)
POTASSIUM SERPL-SCNC: 4.1 MMOL/L (ref 3.5–5.2)
POTASSIUM SERPL-SCNC: 4.2 MMOL/L (ref 3.5–5.1)
POTASSIUM SERPL-SCNC: 4.2 MMOL/L (ref 3.5–5.1)
POTASSIUM SERPL-SCNC: 4.3 MMOL/L (ref 3.5–5.1)
POTASSIUM SERPL-SCNC: 4.3 MMOL/L (ref 3.5–5.1)
POTASSIUM SERPL-SCNC: 4.4 MMOL/L (ref 3.5–5.1)
POTASSIUM SERPL-SCNC: 4.4 MMOL/L (ref 3.5–5.1)
POTASSIUM SERPL-SCNC: 4.5 MMOL/L (ref 3.5–5.1)
POTASSIUM SERPL-SCNC: 4.7 MMOL/L (ref 3.5–5.1)
POTASSIUM SERPL-SCNC: 4.8 MMOL/L (ref 3.5–5.1)
POTASSIUM SERPL-SCNC: 5 MMOL/L (ref 3.5–5.1)
POTASSIUM SERPL-SCNC: 5.2 MMOL/L (ref 3.5–5.1)
POTASSIUM SERPL-SCNC: 6.4 MMOL/L (ref 3.5–5.1)
PROCALCITONIN SERPL-MCNC: 0.05 NG/ML
PROCALCITONIN SERPL-MCNC: 0.19 NG/ML
PROCALCITONIN SERPL-MCNC: 0.33 NG/ML
PROCALCITONIN SERPL-MCNC: 1.05 NG/ML
PROCALCITONIN SERPL-MCNC: 4.45 NG/ML
PROT SERPL-MCNC: 5.5 G/DL (ref 6.4–8.2)
PROT SERPL-MCNC: 5.8 G/DL (ref 6.4–8.2)
PROT SERPL-MCNC: 6 G/DL (ref 6.4–8.2)
PROT SERPL-MCNC: 6 G/DL (ref 6.4–8.2)
PROT SERPL-MCNC: 6 G/DL (ref 6–8.5)
PROT SERPL-MCNC: 6.1 G/DL (ref 6.4–8.2)
PROT SERPL-MCNC: 6.6 G/DL (ref 6.4–8.2)
PROT SERPL-MCNC: 6.6 G/DL (ref 6.4–8.2)
PROT SERPL-MCNC: 6.7 G/DL (ref 6.4–8.2)
PROT SERPL-MCNC: 6.7 G/DL (ref 6.4–8.2)
PROT SERPL-MCNC: 6.8 G/DL (ref 6.4–8.2)
PROT SERPL-MCNC: 7.1 G/DL (ref 6.4–8.2)
PROT SERPL-MCNC: 7.2 G/DL (ref 6.4–8.2)
PROT SERPL-MCNC: 7.4 G/DL (ref 6.4–8.2)
PROT SERPL-MCNC: 7.6 G/DL (ref 6.4–8.2)
PROT UR STRIP-MCNC: 100 MG/DL
PROT UR STRIP-MCNC: 100 MG/DL
PROT UR STRIP-MCNC: 300 MG/DL
Q-T INTERVAL, ECG07: 314 MS
Q-T INTERVAL, ECG07: 368 MS
QRS DURATION, ECG06: 76 MS
QRS DURATION, ECG06: 82 MS
QTC CALCULATION (BEZET), ECG08: 416 MS
QTC CALCULATION (BEZET), ECG08: 434 MS
RBC # BLD AUTO: 2.02 M/UL (ref 3.8–5.2)
RBC # BLD AUTO: 2.05 M/UL (ref 3.8–5.2)
RBC # BLD AUTO: 2.11 M/UL (ref 3.8–5.2)
RBC # BLD AUTO: 2.16 M/UL (ref 3.8–5.2)
RBC # BLD AUTO: 2.16 M/UL (ref 3.8–5.2)
RBC # BLD AUTO: 2.2 M/UL (ref 3.8–5.2)
RBC # BLD AUTO: 2.21 M/UL (ref 3.8–5.2)
RBC # BLD AUTO: 2.21 M/UL (ref 3.8–5.2)
RBC # BLD AUTO: 2.25 M/UL (ref 3.8–5.2)
RBC # BLD AUTO: 2.26 M/UL (ref 3.8–5.2)
RBC # BLD AUTO: 2.36 M/UL (ref 3.8–5.2)
RBC # BLD AUTO: 2.4 M/UL (ref 3.8–5.2)
RBC # BLD AUTO: 2.42 M/UL (ref 3.8–5.2)
RBC # BLD AUTO: 2.43 M/UL (ref 3.8–5.2)
RBC # BLD AUTO: 2.49 X10E6/UL (ref 3.77–5.28)
RBC # BLD AUTO: 2.55 M/UL (ref 3.8–5.2)
RBC # BLD AUTO: 2.62 M/UL (ref 3.8–5.2)
RBC # BLD AUTO: 2.65 M/UL (ref 3.8–5.2)
RBC # BLD AUTO: 2.77 M/UL (ref 3.8–5.2)
RBC # BLD AUTO: 2.86 M/UL (ref 3.8–5.2)
RBC # BLD AUTO: 2.87 M/UL (ref 3.8–5.2)
RBC # BLD AUTO: 2.88 M/UL (ref 3.8–5.2)
RBC # BLD AUTO: 2.94 M/UL (ref 3.8–5.2)
RBC # BLD AUTO: 2.94 M/UL (ref 3.8–5.2)
RBC # BLD AUTO: 3.01 M/UL (ref 3.8–5.2)
RBC #/AREA URNS HPF: >100 /HPF (ref 0–5)
RBC MORPH BLD: ABNORMAL
REPORTED DOSE,DOSE: ABNORMAL UNITS
REPORTED DOSE/TIME,TMG: ABNORMAL
SAMPLES BEING HELD,HOLD: NORMAL
SAO2 % BLD: 78 % (ref 92–97)
SAO2 % BLD: 85 % (ref 92–97)
SAO2 % BLD: 88 % (ref 92–97)
SAO2 % BLD: 88 % (ref 92–97)
SAO2 % BLD: 92 % (ref 92–97)
SAO2 % BLD: 93 % (ref 92–97)
SAO2 % BLD: 97 % (ref 92–97)
SAO2 % BLD: 97 % (ref 92–97)
SAO2% DEVICE SAO2% SENSOR NAME: ABNORMAL
SARS-COV-2, COV2: NORMAL
SERVICE CMNT-IMP: ABNORMAL
SERVICE CMNT-IMP: NORMAL
SODIUM SERPL-SCNC: 131 MMOL/L (ref 136–145)
SODIUM SERPL-SCNC: 135 MMOL/L (ref 136–145)
SODIUM SERPL-SCNC: 138 MMOL/L (ref 136–145)
SODIUM SERPL-SCNC: 138 MMOL/L (ref 136–145)
SODIUM SERPL-SCNC: 139 MMOL/L (ref 136–145)
SODIUM SERPL-SCNC: 140 MMOL/L (ref 136–145)
SODIUM SERPL-SCNC: 141 MMOL/L (ref 136–145)
SODIUM SERPL-SCNC: 142 MMOL/L (ref 134–144)
SODIUM SERPL-SCNC: 142 MMOL/L (ref 136–145)
SODIUM SERPL-SCNC: 144 MMOL/L (ref 136–145)
SODIUM SERPL-SCNC: 144 MMOL/L (ref 136–145)
SODIUM SERPL-SCNC: 145 MMOL/L (ref 136–145)
SODIUM SERPL-SCNC: 146 MMOL/L (ref 136–145)
SOURCE, COVRS: ABNORMAL
SP GR UR REFRACTOMETRY: 1.01 (ref 1–1.03)
SP GR UR REFRACTOMETRY: 1.01 (ref 1–1.03)
SP GR UR REFRACTOMETRY: 1.02 (ref 1–1.03)
SPECIMEN EXP DATE BLD: NORMAL
SPECIMEN SITE: ABNORMAL
SPECIMEN TYPE: ABNORMAL
STATUS OF UNIT,%ST: NORMAL
THERAPEUTIC RANGE,PTTT: ABNORMAL SECS (ref 58–77)
THERAPEUTIC RANGE,PTTT: ABNORMAL SECS (ref 58–77)
THERAPEUTIC RANGE,PTTT: NORMAL SECS (ref 58–77)
TIBC SERPL-MCNC: 293 UG/DL (ref 250–450)
TOTAL RESP. RATE, ITRR: 30
TRANSFERRIN SERPL-MCNC: 214 MG/DL (ref 192–364)
TROPONIN I SERPL-MCNC: <0.05 NG/ML
TROPONIN I SERPL-MCNC: <0.05 NG/ML
UA: UC IF INDICATED,UAUC: ABNORMAL
UNIT DIVISION, %UDIV: 0
UR CULT HOLD, URHOLD: NORMAL
UROBILINOGEN UR QL STRIP.AUTO: 0.2 EU/DL (ref 0.2–1)
VANCOMYCIN SERPL-MCNC: 41.8 UG/ML
VANCOMYCIN TROUGH SERPL-MCNC: 14.3 UG/ML (ref 5–10)
VANCOMYCIN TROUGH SERPL-MCNC: 17.4 UG/ML (ref 5–10)
VANCOMYCIN TROUGH SERPL-MCNC: 48.9 UG/ML (ref 5–10)
VENTILATION MODE VENT: ABNORMAL
VENTRICULAR RATE, ECG03: 115 BPM
VENTRICULAR RATE, ECG03: 77 BPM
VT SETTING VENT: 400 ML
VT SETTING VENT: 400 ML
WBC # BLD AUTO: 11.1 K/UL (ref 3.6–11)
WBC # BLD AUTO: 14.7 K/UL (ref 3.6–11)
WBC # BLD AUTO: 17.3 K/UL (ref 3.6–11)
WBC # BLD AUTO: 19.4 K/UL (ref 3.6–11)
WBC # BLD AUTO: 19.5 K/UL (ref 3.6–11)
WBC # BLD AUTO: 2.9 K/UL (ref 3.6–11)
WBC # BLD AUTO: 28.4 K/UL (ref 3.6–11)
WBC # BLD AUTO: 3.2 K/UL (ref 3.6–11)
WBC # BLD AUTO: 4.1 K/UL (ref 3.6–11)
WBC # BLD AUTO: 4.6 X10E3/UL (ref 3.4–10.8)
WBC # BLD AUTO: 50.4 K/UL (ref 3.6–11)
WBC # BLD AUTO: 56.4 K/UL (ref 3.6–11)
WBC # BLD AUTO: 6.1 K/UL (ref 3.6–11)
WBC # BLD AUTO: 6.2 K/UL (ref 3.6–11)
WBC # BLD AUTO: 6.5 K/UL (ref 3.6–11)
WBC # BLD AUTO: 6.6 K/UL (ref 3.6–11)
WBC # BLD AUTO: 7.1 K/UL (ref 3.6–11)
WBC # BLD AUTO: 7.7 K/UL (ref 3.6–11)
WBC # BLD AUTO: 7.9 K/UL (ref 3.6–11)
WBC # BLD AUTO: 8 K/UL (ref 3.6–11)
WBC # BLD AUTO: 8.3 K/UL (ref 3.6–11)
WBC # BLD AUTO: 8.7 K/UL (ref 3.6–11)
WBC # BLD AUTO: 8.7 K/UL (ref 3.6–11)
WBC MORPH BLD: ABNORMAL
WBC URNS QL MICRO: >100 /HPF (ref 0–4)

## 2021-01-01 PROCEDURE — 82962 GLUCOSE BLOOD TEST: CPT

## 2021-01-01 PROCEDURE — 74011250637 HC RX REV CODE- 250/637: Performed by: INTERNAL MEDICINE

## 2021-01-01 PROCEDURE — 84466 ASSAY OF TRANSFERRIN: CPT

## 2021-01-01 PROCEDURE — 74011250636 HC RX REV CODE- 250/636: Performed by: NURSE PRACTITIONER

## 2021-01-01 PROCEDURE — 83735 ASSAY OF MAGNESIUM: CPT

## 2021-01-01 PROCEDURE — 96375 TX/PRO/DX INJ NEW DRUG ADDON: CPT

## 2021-01-01 PROCEDURE — 36415 COLL VENOUS BLD VENIPUNCTURE: CPT

## 2021-01-01 PROCEDURE — 36430 TRANSFUSION BLD/BLD COMPNT: CPT

## 2021-01-01 PROCEDURE — 74011000250 HC RX REV CODE- 250: Performed by: PHYSICIAN ASSISTANT

## 2021-01-01 PROCEDURE — 71045 X-RAY EXAM CHEST 1 VIEW: CPT

## 2021-01-01 PROCEDURE — 74011636637 HC RX REV CODE- 636/637: Performed by: NURSE PRACTITIONER

## 2021-01-01 PROCEDURE — 80053 COMPREHEN METABOLIC PANEL: CPT

## 2021-01-01 PROCEDURE — 74011636637 HC RX REV CODE- 636/637: Performed by: INTERNAL MEDICINE

## 2021-01-01 PROCEDURE — 86304 IMMUNOASSAY TUMOR CA 125: CPT

## 2021-01-01 PROCEDURE — 84100 ASSAY OF PHOSPHORUS: CPT

## 2021-01-01 PROCEDURE — 36600 WITHDRAWAL OF ARTERIAL BLOOD: CPT

## 2021-01-01 PROCEDURE — 74011250637 HC RX REV CODE- 250/637: Performed by: NURSE PRACTITIONER

## 2021-01-01 PROCEDURE — 85025 COMPLETE CBC W/AUTO DIFF WBC: CPT

## 2021-01-01 PROCEDURE — 80202 ASSAY OF VANCOMYCIN: CPT

## 2021-01-01 PROCEDURE — 83605 ASSAY OF LACTIC ACID: CPT

## 2021-01-01 PROCEDURE — 94660 CPAP INITIATION&MGMT: CPT

## 2021-01-01 PROCEDURE — 74011000258 HC RX REV CODE- 258: Performed by: INTERNAL MEDICINE

## 2021-01-01 PROCEDURE — 86140 C-REACTIVE PROTEIN: CPT

## 2021-01-01 PROCEDURE — G8417 CALC BMI ABV UP PARAM F/U: HCPCS | Performed by: OBSTETRICS & GYNECOLOGY

## 2021-01-01 PROCEDURE — 65610000006 HC RM INTENSIVE CARE

## 2021-01-01 PROCEDURE — 3017F COLORECTAL CA SCREEN DOC REV: CPT | Performed by: OBSTETRICS & GYNECOLOGY

## 2021-01-01 PROCEDURE — 74011000258 HC RX REV CODE- 258: Performed by: PHYSICIAN ASSISTANT

## 2021-01-01 PROCEDURE — 96365 THER/PROPH/DIAG IV INF INIT: CPT

## 2021-01-01 PROCEDURE — 87186 SC STD MICRODIL/AGAR DIL: CPT

## 2021-01-01 PROCEDURE — 83615 LACTATE (LD) (LDH) ENZYME: CPT

## 2021-01-01 PROCEDURE — 80048 BASIC METABOLIC PNL TOTAL CA: CPT

## 2021-01-01 PROCEDURE — 77030014006 HC SPNG HEMSTAT J&J -A

## 2021-01-01 PROCEDURE — 74011250636 HC RX REV CODE- 250/636: Performed by: INTERNAL MEDICINE

## 2021-01-01 PROCEDURE — 87070 CULTURE OTHR SPECIMN AEROBIC: CPT

## 2021-01-01 PROCEDURE — 85027 COMPLETE CBC AUTOMATED: CPT

## 2021-01-01 PROCEDURE — 94640 AIRWAY INHALATION TREATMENT: CPT

## 2021-01-01 PROCEDURE — G9717 DOC PT DX DEP/BP F/U NT REQ: HCPCS | Performed by: OBSTETRICS & GYNECOLOGY

## 2021-01-01 PROCEDURE — 77030012965 HC NDL HUBR BBMI -A

## 2021-01-01 PROCEDURE — 99213 OFFICE O/P EST LOW 20 MIN: CPT | Performed by: PHYSICIAN ASSISTANT

## 2021-01-01 PROCEDURE — 77010033678 HC OXYGEN DAILY

## 2021-01-01 PROCEDURE — 99214 OFFICE O/P EST MOD 30 MIN: CPT | Performed by: OBSTETRICS & GYNECOLOGY

## 2021-01-01 PROCEDURE — 87086 URINE CULTURE/COLONY COUNT: CPT

## 2021-01-01 PROCEDURE — 99219 PR INITIAL OBSERVATION CARE/DAY 50 MINUTES: CPT | Performed by: PHYSICIAN ASSISTANT

## 2021-01-01 PROCEDURE — 74011250637 HC RX REV CODE- 250/637: Performed by: PHYSICIAN ASSISTANT

## 2021-01-01 PROCEDURE — 84145 PROCALCITONIN (PCT): CPT

## 2021-01-01 PROCEDURE — 77010033711 HC HIGH FLOW OXYGEN

## 2021-01-01 PROCEDURE — G8754 DIAS BP LESS 90: HCPCS | Performed by: OBSTETRICS & GYNECOLOGY

## 2021-01-01 PROCEDURE — 74018 RADEX ABDOMEN 1 VIEW: CPT

## 2021-01-01 PROCEDURE — 2709999900 HC NON-CHARGEABLE SUPPLY

## 2021-01-01 PROCEDURE — 76770 US EXAM ABDO BACK WALL COMP: CPT

## 2021-01-01 PROCEDURE — 74011000636 HC RX REV CODE- 636: Performed by: OBSTETRICS & GYNECOLOGY

## 2021-01-01 PROCEDURE — 99214 OFFICE O/P EST MOD 30 MIN: CPT | Performed by: PHYSICIAN ASSISTANT

## 2021-01-01 PROCEDURE — 74011250637 HC RX REV CODE- 250/637: Performed by: OBSTETRICS & GYNECOLOGY

## 2021-01-01 PROCEDURE — 74011000250 HC RX REV CODE- 250: Performed by: INTERNAL MEDICINE

## 2021-01-01 PROCEDURE — 74011250636 HC RX REV CODE- 250/636: Performed by: PHYSICIAN ASSISTANT

## 2021-01-01 PROCEDURE — 86923 COMPATIBILITY TEST ELECTRIC: CPT

## 2021-01-01 PROCEDURE — G8427 DOCREV CUR MEDS BY ELIG CLIN: HCPCS | Performed by: OBSTETRICS & GYNECOLOGY

## 2021-01-01 PROCEDURE — 96413 CHEMO IV INFUSION 1 HR: CPT

## 2021-01-01 PROCEDURE — 82803 BLOOD GASES ANY COMBINATION: CPT

## 2021-01-01 PROCEDURE — 87077 CULTURE AEROBIC IDENTIFY: CPT

## 2021-01-01 PROCEDURE — 71260 CT THORAX DX C+: CPT

## 2021-01-01 PROCEDURE — 82728 ASSAY OF FERRITIN: CPT

## 2021-01-01 PROCEDURE — 74011000258 HC RX REV CODE- 258: Performed by: EMERGENCY MEDICINE

## 2021-01-01 PROCEDURE — 85379 FIBRIN DEGRADATION QUANT: CPT

## 2021-01-01 PROCEDURE — 99233 SBSQ HOSP IP/OBS HIGH 50: CPT | Performed by: NURSE PRACTITIONER

## 2021-01-01 PROCEDURE — 86901 BLOOD TYPING SEROLOGIC RH(D): CPT

## 2021-01-01 PROCEDURE — 93005 ELECTROCARDIOGRAM TRACING: CPT

## 2021-01-01 PROCEDURE — 81001 URINALYSIS AUTO W/SCOPE: CPT

## 2021-01-01 PROCEDURE — 74011000258 HC RX REV CODE- 258: Performed by: NURSE PRACTITIONER

## 2021-01-01 PROCEDURE — 74011250636 HC RX REV CODE- 250/636: Performed by: OBSTETRICS & GYNECOLOGY

## 2021-01-01 PROCEDURE — 5A1955Z RESPIRATORY VENTILATION, GREATER THAN 96 CONSECUTIVE HOURS: ICD-10-PCS | Performed by: ANESTHESIOLOGY

## 2021-01-01 PROCEDURE — P9016 RBC LEUKOCYTES REDUCED: HCPCS

## 2021-01-01 PROCEDURE — 0BH17EZ INSERTION OF ENDOTRACHEAL AIRWAY INTO TRACHEA, VIA NATURAL OR ARTIFICIAL OPENING: ICD-10-PCS | Performed by: ANESTHESIOLOGY

## 2021-01-01 PROCEDURE — 85730 THROMBOPLASTIN TIME PARTIAL: CPT

## 2021-01-01 PROCEDURE — 96374 THER/PROPH/DIAG INJ IV PUSH: CPT

## 2021-01-01 PROCEDURE — 74011000250 HC RX REV CODE- 250: Performed by: NURSE PRACTITIONER

## 2021-01-01 PROCEDURE — 83540 ASSAY OF IRON: CPT

## 2021-01-01 PROCEDURE — 71250 CT THORAX DX C-: CPT

## 2021-01-01 PROCEDURE — 84484 ASSAY OF TROPONIN QUANT: CPT

## 2021-01-01 PROCEDURE — 99215 OFFICE O/P EST HI 40 MIN: CPT | Performed by: PHYSICIAN ASSISTANT

## 2021-01-01 PROCEDURE — 91300 COVID-19, MRNA, LNP-S, PF, 30MCG/0.3ML DOSE(PFIZER): CPT | Performed by: FAMILY MEDICINE

## 2021-01-01 PROCEDURE — 85018 HEMOGLOBIN: CPT

## 2021-01-01 PROCEDURE — 99218 HC RM OBSERVATION: CPT

## 2021-01-01 PROCEDURE — 83036 HEMOGLOBIN GLYCOSYLATED A1C: CPT

## 2021-01-01 PROCEDURE — 99217 PR OBSERVATION CARE DISCHARGE MANAGEMENT: CPT | Performed by: OBSTETRICS & GYNECOLOGY

## 2021-01-01 PROCEDURE — 94003 VENT MGMT INPAT SUBQ DAY: CPT

## 2021-01-01 PROCEDURE — 93971 EXTREMITY STUDY: CPT

## 2021-01-01 PROCEDURE — 74011636637 HC RX REV CODE- 636/637: Performed by: PHYSICIAN ASSISTANT

## 2021-01-01 PROCEDURE — 3046F HEMOGLOBIN A1C LEVEL >9.0%: CPT | Performed by: OBSTETRICS & GYNECOLOGY

## 2021-01-01 PROCEDURE — 86141 C-REACTIVE PROTEIN HS: CPT

## 2021-01-01 PROCEDURE — 85384 FIBRINOGEN ACTIVITY: CPT

## 2021-01-01 PROCEDURE — G8756 NO BP MEASURE DOC: HCPCS | Performed by: OBSTETRICS & GYNECOLOGY

## 2021-01-01 PROCEDURE — 74011250636 HC RX REV CODE- 250/636: Performed by: EMERGENCY MEDICINE

## 2021-01-01 PROCEDURE — 2022F DILAT RTA XM EVC RTNOPTHY: CPT | Performed by: OBSTETRICS & GYNECOLOGY

## 2021-01-01 PROCEDURE — G8752 SYS BP LESS 140: HCPCS | Performed by: OBSTETRICS & GYNECOLOGY

## 2021-01-01 PROCEDURE — 77030022017 HC DRSG HEMO QCLOT ZMED -A

## 2021-01-01 PROCEDURE — 77030021678 HC GLIDESCP STAT DISP VERT -B

## 2021-01-01 PROCEDURE — 77030013169 SET IV BLD ICUM -A

## 2021-01-01 PROCEDURE — 87635 SARS-COV-2 COVID-19 AMP PRB: CPT

## 2021-01-01 PROCEDURE — 3E0333Z INTRODUCTION OF ANTI-INFLAMMATORY INTO PERIPHERAL VEIN, PERCUTANEOUS APPROACH: ICD-10-PCS | Performed by: INTERNAL MEDICINE

## 2021-01-01 PROCEDURE — 74177 CT ABD & PELVIS W/CONTRAST: CPT

## 2021-01-01 PROCEDURE — 0002A COVID-19, MRNA, LNP-S, PF, 30MCG/0.3ML DOSE(PFIZER): CPT | Performed by: FAMILY MEDICINE

## 2021-01-01 PROCEDURE — 87040 BLOOD CULTURE FOR BACTERIA: CPT

## 2021-01-01 PROCEDURE — 99285 EMERGENCY DEPT VISIT HI MDM: CPT

## 2021-01-01 PROCEDURE — 83880 ASSAY OF NATRIURETIC PEPTIDE: CPT

## 2021-01-01 PROCEDURE — 74011250636 HC RX REV CODE- 250/636

## 2021-01-01 PROCEDURE — 94002 VENT MGMT INPAT INIT DAY: CPT

## 2021-01-01 PROCEDURE — 0001A COVID-19, MRNA, LNP-S, PF, 30MCG/0.3ML DOSE(PFIZER): CPT | Performed by: FAMILY MEDICINE

## 2021-01-01 RX ORDER — ALBUTEROL SULFATE 0.83 MG/ML
2.5 SOLUTION RESPIRATORY (INHALATION) AS NEEDED
Status: CANCELLED
Start: 2021-01-01

## 2021-01-01 RX ORDER — ALBUTEROL SULFATE 0.83 MG/ML
2.5 SOLUTION RESPIRATORY (INHALATION)
Status: DISCONTINUED | OUTPATIENT
Start: 2021-01-01 | End: 2021-01-01 | Stop reason: HOSPADM

## 2021-01-01 RX ORDER — HEPARIN SODIUM 5000 [USP'U]/ML
2000 INJECTION, SOLUTION INTRAVENOUS; SUBCUTANEOUS AS NEEDED
Status: DISCONTINUED | OUTPATIENT
Start: 2021-01-01 | End: 2021-01-01 | Stop reason: HOSPADM

## 2021-01-01 RX ORDER — SODIUM CHLORIDE 9 MG/ML
10 INJECTION INTRAMUSCULAR; INTRAVENOUS; SUBCUTANEOUS AS NEEDED
Status: CANCELLED | OUTPATIENT
Start: 2021-01-01

## 2021-01-01 RX ORDER — ACETAMINOPHEN 325 MG/1
650 TABLET ORAL AS NEEDED
Status: CANCELLED
Start: 2021-01-01

## 2021-01-01 RX ORDER — DIPHENHYDRAMINE HYDROCHLORIDE 50 MG/ML
50 INJECTION, SOLUTION INTRAMUSCULAR; INTRAVENOUS ONCE
Status: CANCELLED | OUTPATIENT
Start: 2021-01-01 | End: 2021-01-01

## 2021-01-01 RX ORDER — PROPOFOL 10 MG/ML
0-50 VIAL (ML) INTRAVENOUS
Status: DISCONTINUED | OUTPATIENT
Start: 2021-01-01 | End: 2021-01-01 | Stop reason: HOSPADM

## 2021-01-01 RX ORDER — HEPARIN 100 UNIT/ML
300-500 SYRINGE INTRAVENOUS AS NEEDED
Status: CANCELLED | OUTPATIENT
Start: 2021-01-01

## 2021-01-01 RX ORDER — SODIUM CHLORIDE 0.9 % (FLUSH) 0.9 %
10 SYRINGE (ML) INJECTION AS NEEDED
Status: DISPENSED | OUTPATIENT
Start: 2021-01-01 | End: 2021-01-01

## 2021-01-01 RX ORDER — SODIUM CHLORIDE 0.9 % (FLUSH) 0.9 %
10 SYRINGE (ML) INJECTION AS NEEDED
Status: CANCELLED | OUTPATIENT
Start: 2021-01-01

## 2021-01-01 RX ORDER — LEVOTHYROXINE SODIUM 150 UG/1
150 TABLET ORAL
Status: DISCONTINUED | OUTPATIENT
Start: 2021-01-01 | End: 2021-01-01 | Stop reason: HOSPADM

## 2021-01-01 RX ORDER — HYDROCORTISONE SODIUM SUCCINATE 100 MG/2ML
100 INJECTION, POWDER, FOR SOLUTION INTRAMUSCULAR; INTRAVENOUS AS NEEDED
Status: CANCELLED | OUTPATIENT
Start: 2021-01-01

## 2021-01-01 RX ORDER — SODIUM CHLORIDE 0.9 % (FLUSH) 0.9 %
10 SYRINGE (ML) INJECTION AS NEEDED
Status: DISCONTINUED | OUTPATIENT
Start: 2021-01-01 | End: 2021-01-01 | Stop reason: HOSPADM

## 2021-01-01 RX ORDER — SODIUM CHLORIDE 9 MG/ML
10 INJECTION INTRAMUSCULAR; INTRAVENOUS; SUBCUTANEOUS AS NEEDED
Status: DISCONTINUED | OUTPATIENT
Start: 2021-01-01 | End: 2021-01-01 | Stop reason: HOSPADM

## 2021-01-01 RX ORDER — PROPOFOL 10 MG/ML
0-50 VIAL (ML) INTRAVENOUS
Status: DISCONTINUED | OUTPATIENT
Start: 2021-01-01 | End: 2021-01-01

## 2021-01-01 RX ORDER — DIPHENHYDRAMINE HYDROCHLORIDE 50 MG/ML
50 INJECTION, SOLUTION INTRAMUSCULAR; INTRAVENOUS ONCE
Status: COMPLETED | OUTPATIENT
Start: 2021-01-01 | End: 2021-01-01

## 2021-01-01 RX ORDER — SODIUM CHLORIDE 9 MG/ML
25 INJECTION, SOLUTION INTRAVENOUS CONTINUOUS
Status: DISPENSED | OUTPATIENT
Start: 2021-01-01 | End: 2021-01-01

## 2021-01-01 RX ORDER — HEPARIN 100 UNIT/ML
300-500 SYRINGE INTRAVENOUS AS NEEDED
Status: ACTIVE | OUTPATIENT
Start: 2021-01-01 | End: 2021-01-01

## 2021-01-01 RX ORDER — EPINEPHRINE 1 MG/ML
0.3 INJECTION, SOLUTION, CONCENTRATE INTRAVENOUS AS NEEDED
Status: CANCELLED | OUTPATIENT
Start: 2021-01-01

## 2021-01-01 RX ORDER — FUROSEMIDE 40 MG/1
20 TABLET ORAL EVERY OTHER DAY
Status: DISCONTINUED | OUTPATIENT
Start: 2021-01-01 | End: 2021-01-01 | Stop reason: HOSPADM

## 2021-01-01 RX ORDER — DIPHENHYDRAMINE HYDROCHLORIDE 50 MG/ML
25 INJECTION, SOLUTION INTRAMUSCULAR; INTRAVENOUS AS NEEDED
Status: CANCELLED
Start: 2021-01-01

## 2021-01-01 RX ORDER — SODIUM CHLORIDE 9 MG/ML
25 INJECTION, SOLUTION INTRAVENOUS CONTINUOUS
Status: CANCELLED | OUTPATIENT
Start: 2021-01-01

## 2021-01-01 RX ORDER — PALONOSETRON 0.05 MG/ML
0.25 INJECTION, SOLUTION INTRAVENOUS ONCE
Status: CANCELLED | OUTPATIENT
Start: 2021-01-01 | End: 2021-01-01

## 2021-01-01 RX ORDER — SODIUM CHLORIDE 9 MG/ML
250 INJECTION, SOLUTION INTRAVENOUS AS NEEDED
Status: DISCONTINUED | OUTPATIENT
Start: 2021-01-01 | End: 2021-01-01 | Stop reason: HOSPADM

## 2021-01-01 RX ORDER — PREDNISONE 50 MG/1
TABLET ORAL
Qty: 3 TAB | Refills: 0 | Status: ON HOLD | OUTPATIENT
Start: 2021-01-01 | End: 2021-01-01

## 2021-01-01 RX ORDER — SODIUM CHLORIDE 9 MG/ML
250 INJECTION, SOLUTION INTRAVENOUS AS NEEDED
Status: DISCONTINUED | OUTPATIENT
Start: 2021-01-01 | End: 2021-01-01

## 2021-01-01 RX ORDER — ENOXAPARIN SODIUM 150 MG/ML
1 INJECTION SUBCUTANEOUS EVERY 24 HOURS
Status: DISCONTINUED | OUTPATIENT
Start: 2021-01-01 | End: 2021-01-01

## 2021-01-01 RX ORDER — DIPHENHYDRAMINE HYDROCHLORIDE 50 MG/ML
50 INJECTION, SOLUTION INTRAMUSCULAR; INTRAVENOUS AS NEEDED
Status: CANCELLED
Start: 2021-01-01

## 2021-01-01 RX ORDER — FUROSEMIDE 10 MG/ML
40 INJECTION INTRAMUSCULAR; INTRAVENOUS ONCE
Status: COMPLETED | OUTPATIENT
Start: 2021-01-01 | End: 2021-01-01

## 2021-01-01 RX ORDER — ACETAMINOPHEN 325 MG/1
975 TABLET ORAL EVERY 6 HOURS
Status: DISCONTINUED | OUTPATIENT
Start: 2021-01-01 | End: 2021-01-01

## 2021-01-01 RX ORDER — SODIUM CHLORIDE, SODIUM LACTATE, POTASSIUM CHLORIDE, CALCIUM CHLORIDE 600; 310; 30; 20 MG/100ML; MG/100ML; MG/100ML; MG/100ML
75 INJECTION, SOLUTION INTRAVENOUS CONTINUOUS
Status: DISPENSED | OUTPATIENT
Start: 2021-01-01 | End: 2021-01-01

## 2021-01-01 RX ORDER — PALONOSETRON 0.05 MG/ML
0.25 INJECTION, SOLUTION INTRAVENOUS ONCE
Status: COMPLETED | OUTPATIENT
Start: 2021-01-01 | End: 2021-01-01

## 2021-01-01 RX ORDER — DIPHENHYDRAMINE HYDROCHLORIDE 50 MG/ML
50 INJECTION, SOLUTION INTRAMUSCULAR; INTRAVENOUS
Status: DISCONTINUED | OUTPATIENT
Start: 2021-01-01 | End: 2021-01-01

## 2021-01-01 RX ORDER — INSULIN LISPRO 100 [IU]/ML
14 INJECTION, SOLUTION INTRAVENOUS; SUBCUTANEOUS ONCE
Status: COMPLETED | OUTPATIENT
Start: 2021-01-01 | End: 2021-01-01

## 2021-01-01 RX ORDER — ONDANSETRON 2 MG/ML
4 INJECTION INTRAMUSCULAR; INTRAVENOUS
Status: DISCONTINUED | OUTPATIENT
Start: 2021-01-01 | End: 2021-01-01 | Stop reason: HOSPADM

## 2021-01-01 RX ORDER — AMOXICILLIN 500 MG/1
CAPSULE ORAL
Status: ON HOLD | COMMUNITY
Start: 2021-01-01 | End: 2021-01-01

## 2021-01-01 RX ORDER — SODIUM CHLORIDE 9 MG/ML
10 INJECTION INTRAMUSCULAR; INTRAVENOUS; SUBCUTANEOUS AS NEEDED
Status: ACTIVE | OUTPATIENT
Start: 2021-01-01 | End: 2021-01-01

## 2021-01-01 RX ORDER — DEXTROSE 50 % IN WATER (D50W) INTRAVENOUS SYRINGE
25-50 AS NEEDED
Status: DISCONTINUED | OUTPATIENT
Start: 2021-01-01 | End: 2021-01-01

## 2021-01-01 RX ORDER — CHLORHEXIDINE GLUCONATE 0.12 MG/ML
15 RINSE ORAL EVERY 12 HOURS
Status: DISCONTINUED | OUTPATIENT
Start: 2021-01-01 | End: 2021-01-01 | Stop reason: HOSPADM

## 2021-01-01 RX ORDER — INSULIN LISPRO 100 [IU]/ML
INJECTION, SOLUTION INTRAVENOUS; SUBCUTANEOUS EVERY 6 HOURS
Status: DISCONTINUED | OUTPATIENT
Start: 2021-01-01 | End: 2021-01-01

## 2021-01-01 RX ORDER — HEPARIN 100 UNIT/ML
300-500 SYRINGE INTRAVENOUS AS NEEDED
Status: DISCONTINUED | OUTPATIENT
Start: 2021-01-01 | End: 2021-01-01 | Stop reason: HOSPADM

## 2021-01-01 RX ORDER — SODIUM CHLORIDE 9 MG/ML
25 INJECTION, SOLUTION INTRAVENOUS CONTINUOUS
Status: DISCONTINUED | OUTPATIENT
Start: 2021-01-01 | End: 2021-01-01 | Stop reason: HOSPADM

## 2021-01-01 RX ORDER — PROCHLORPERAZINE EDISYLATE 5 MG/ML
10 INJECTION INTRAMUSCULAR; INTRAVENOUS
Status: DISCONTINUED | OUTPATIENT
Start: 2021-01-01 | End: 2021-01-01 | Stop reason: SDUPTHER

## 2021-01-01 RX ORDER — ASCORBIC ACID 500 MG
500 TABLET ORAL DAILY
Status: DISPENSED | OUTPATIENT
Start: 2021-01-01 | End: 2021-01-01

## 2021-01-01 RX ORDER — FLUCONAZOLE 200 MG/1
200 TABLET ORAL DAILY
Qty: 14 TAB | Refills: 0 | Status: SHIPPED | OUTPATIENT
Start: 2021-01-01 | End: 2021-01-01

## 2021-01-01 RX ORDER — ACETAMINOPHEN 325 MG/1
650 TABLET ORAL
Status: DISCONTINUED | OUTPATIENT
Start: 2021-01-01 | End: 2021-01-01 | Stop reason: HOSPADM

## 2021-01-01 RX ORDER — SODIUM BICARBONATE 1 MEQ/ML
100 SYRINGE (ML) INTRAVENOUS ONCE
Status: COMPLETED | OUTPATIENT
Start: 2021-01-01 | End: 2021-01-01

## 2021-01-01 RX ORDER — ACETAMINOPHEN 325 MG/1
650 TABLET ORAL ONCE
Status: COMPLETED | OUTPATIENT
Start: 2021-01-01 | End: 2021-01-01

## 2021-01-01 RX ORDER — INSULIN LISPRO 100 [IU]/ML
5 INJECTION, SOLUTION INTRAVENOUS; SUBCUTANEOUS ONCE
Status: COMPLETED | OUTPATIENT
Start: 2021-01-01 | End: 2021-01-01

## 2021-01-01 RX ORDER — FUROSEMIDE 20 MG/1
20 TABLET ORAL 2 TIMES DAILY
COMMUNITY

## 2021-01-01 RX ORDER — GABAPENTIN 100 MG/1
200 CAPSULE ORAL
Qty: 60 CAP | Refills: 0 | Status: SHIPPED | OUTPATIENT
Start: 2021-01-01

## 2021-01-01 RX ORDER — GUAIFENESIN 600 MG/1
600 TABLET, EXTENDED RELEASE ORAL EVERY 12 HOURS
Status: DISCONTINUED | OUTPATIENT
Start: 2021-01-01 | End: 2021-01-01

## 2021-01-01 RX ORDER — MAGNESIUM SULFATE 100 %
4 CRYSTALS MISCELLANEOUS AS NEEDED
Status: DISCONTINUED | OUTPATIENT
Start: 2021-01-01 | End: 2021-01-01

## 2021-01-01 RX ORDER — ZINC SULFATE 50(220)MG
1 CAPSULE ORAL DAILY
Status: DISCONTINUED | OUTPATIENT
Start: 2021-01-01 | End: 2021-01-01

## 2021-01-01 RX ORDER — HEPARIN SODIUM 10000 [USP'U]/100ML
8.7-25 INJECTION, SOLUTION INTRAVENOUS
Status: DISCONTINUED | OUTPATIENT
Start: 2021-01-01 | End: 2021-01-01

## 2021-01-01 RX ORDER — FUROSEMIDE 10 MG/ML
80 INJECTION INTRAMUSCULAR; INTRAVENOUS ONCE
Status: COMPLETED | OUTPATIENT
Start: 2021-01-01 | End: 2021-01-01

## 2021-01-01 RX ORDER — ALBUTEROL SULFATE 90 UG/1
2 AEROSOL, METERED RESPIRATORY (INHALATION)
COMMUNITY

## 2021-01-01 RX ORDER — DIPHENHYDRAMINE HCL 25 MG
25 CAPSULE ORAL ONCE
Status: COMPLETED | OUTPATIENT
Start: 2021-01-01 | End: 2021-01-01

## 2021-01-01 RX ORDER — CISATRACURIUM BESYLATE 2 MG/ML
0.2 INJECTION, SOLUTION INTRAVENOUS ONCE
Status: COMPLETED | OUTPATIENT
Start: 2021-01-01 | End: 2021-01-01

## 2021-01-01 RX ORDER — EPINEPHRINE 1 MG/ML
0.3 INJECTION, SOLUTION, CONCENTRATE INTRAVENOUS
Status: DISCONTINUED | OUTPATIENT
Start: 2021-01-01 | End: 2021-01-01

## 2021-01-01 RX ORDER — MIDAZOLAM HYDROCHLORIDE 1 MG/ML
INJECTION, SOLUTION INTRAMUSCULAR; INTRAVENOUS
Status: COMPLETED
Start: 2021-01-01 | End: 2021-01-01

## 2021-01-01 RX ORDER — ACETAMINOPHEN 650 MG/1
650 SUPPOSITORY RECTAL
Status: DISCONTINUED | OUTPATIENT
Start: 2021-01-01 | End: 2021-01-01 | Stop reason: HOSPADM

## 2021-01-01 RX ORDER — SERTRALINE HYDROCHLORIDE 50 MG/1
50 TABLET, FILM COATED ORAL DAILY
Status: DISCONTINUED | OUTPATIENT
Start: 2021-01-01 | End: 2021-01-01

## 2021-01-01 RX ORDER — ENOXAPARIN SODIUM 100 MG/ML
40 INJECTION SUBCUTANEOUS DAILY
Status: DISCONTINUED | OUTPATIENT
Start: 2021-01-01 | End: 2021-01-01

## 2021-01-01 RX ORDER — FLUTICASONE PROPIONATE 50 MCG
2 SPRAY, SUSPENSION (ML) NASAL DAILY
Status: DISCONTINUED | OUTPATIENT
Start: 2021-01-01 | End: 2021-01-01 | Stop reason: HOSPADM

## 2021-01-01 RX ORDER — LEVOTHYROXINE SODIUM 150 UG/1
150 TABLET ORAL
Status: DISCONTINUED | OUTPATIENT
Start: 2021-01-01 | End: 2021-01-01

## 2021-01-01 RX ORDER — DEXTROSE 50 % IN WATER (D50W) INTRAVENOUS SYRINGE
12.5-25 AS NEEDED
Status: DISCONTINUED | OUTPATIENT
Start: 2021-01-01 | End: 2021-01-01 | Stop reason: HOSPADM

## 2021-01-01 RX ORDER — ENOXAPARIN SODIUM 150 MG/ML
1 INJECTION SUBCUTANEOUS EVERY 12 HOURS
Status: DISCONTINUED | OUTPATIENT
Start: 2021-01-01 | End: 2021-01-01

## 2021-01-01 RX ORDER — ONDANSETRON 2 MG/ML
8 INJECTION INTRAMUSCULAR; INTRAVENOUS AS NEEDED
Status: CANCELLED | OUTPATIENT
Start: 2021-01-01

## 2021-01-01 RX ORDER — GABAPENTIN 100 MG/1
200 CAPSULE ORAL
Status: DISCONTINUED | OUTPATIENT
Start: 2021-01-01 | End: 2021-01-01

## 2021-01-01 RX ORDER — HEPARIN SODIUM 5000 [USP'U]/ML
4000 INJECTION, SOLUTION INTRAVENOUS; SUBCUTANEOUS AS NEEDED
Status: DISCONTINUED | OUTPATIENT
Start: 2021-01-01 | End: 2021-01-01 | Stop reason: HOSPADM

## 2021-01-01 RX ORDER — AMOXICILLIN 500 MG/1
500 CAPSULE ORAL 3 TIMES DAILY
Qty: 21 CAP | Refills: 0 | Status: SHIPPED | OUTPATIENT
Start: 2021-01-01 | End: 2021-01-01

## 2021-01-01 RX ORDER — FENTANYL CITRATE 50 UG/ML
200 INJECTION, SOLUTION INTRAMUSCULAR; INTRAVENOUS ONCE
Status: COMPLETED | OUTPATIENT
Start: 2021-01-01 | End: 2021-01-01

## 2021-01-01 RX ORDER — SODIUM CHLORIDE 450 MG/100ML
100 INJECTION, SOLUTION INTRAVENOUS CONTINUOUS
Status: DISCONTINUED | OUTPATIENT
Start: 2021-01-01 | End: 2021-01-01

## 2021-01-01 RX ORDER — SERTRALINE HYDROCHLORIDE 50 MG/1
50 TABLET, FILM COATED ORAL EVERY EVENING
Status: DISCONTINUED | OUTPATIENT
Start: 2021-01-01 | End: 2021-01-01 | Stop reason: HOSPADM

## 2021-01-01 RX ORDER — HYDRALAZINE HYDROCHLORIDE 20 MG/ML
20 INJECTION INTRAMUSCULAR; INTRAVENOUS
Status: DISCONTINUED | OUTPATIENT
Start: 2021-01-01 | End: 2021-01-01

## 2021-01-01 RX ORDER — SODIUM CHLORIDE 0.9 % (FLUSH) 0.9 %
5-40 SYRINGE (ML) INJECTION EVERY 8 HOURS
Status: DISCONTINUED | OUTPATIENT
Start: 2021-01-01 | End: 2021-01-01 | Stop reason: HOSPADM

## 2021-01-01 RX ORDER — BACITRACIN 500 UNIT/G
1 PACKET (EA) TOPICAL AS NEEDED
Status: DISCONTINUED | OUTPATIENT
Start: 2021-01-01 | End: 2021-01-01 | Stop reason: HOSPADM

## 2021-01-01 RX ORDER — INSULIN LISPRO 100 [IU]/ML
INJECTION, SOLUTION INTRAVENOUS; SUBCUTANEOUS
Status: DISCONTINUED | OUTPATIENT
Start: 2021-01-01 | End: 2021-01-01 | Stop reason: HOSPADM

## 2021-01-01 RX ORDER — SODIUM CHLORIDE 0.9 % (FLUSH) 0.9 %
5-10 SYRINGE (ML) INJECTION AS NEEDED
Status: DISCONTINUED | OUTPATIENT
Start: 2021-01-01 | End: 2021-01-01 | Stop reason: SDUPTHER

## 2021-01-01 RX ORDER — ACETAMINOPHEN 325 MG/1
650 TABLET ORAL ONCE
Status: ACTIVE | OUTPATIENT
Start: 2021-01-01 | End: 2021-01-01

## 2021-01-01 RX ORDER — NOREPINEPHRINE BITARTRATE/D5W 8 MG/250ML
.5-16 PLASTIC BAG, INJECTION (ML) INTRAVENOUS
Status: DISCONTINUED | OUTPATIENT
Start: 2021-01-01 | End: 2021-01-01

## 2021-01-01 RX ORDER — APIXABAN 5 MG/1
TABLET, FILM COATED ORAL
COMMUNITY
Start: 2021-01-01 | End: 2021-01-01

## 2021-01-01 RX ORDER — MIDAZOLAM HYDROCHLORIDE 1 MG/ML
4 INJECTION, SOLUTION INTRAMUSCULAR; INTRAVENOUS ONCE
Status: COMPLETED | OUTPATIENT
Start: 2021-01-01 | End: 2021-01-01

## 2021-01-01 RX ORDER — GABAPENTIN 300 MG/1
300 CAPSULE ORAL
Status: DISCONTINUED | OUTPATIENT
Start: 2021-01-01 | End: 2021-01-01 | Stop reason: HOSPADM

## 2021-01-01 RX ORDER — MONTELUKAST SODIUM 10 MG/1
10 TABLET ORAL
Status: DISCONTINUED | OUTPATIENT
Start: 2021-01-01 | End: 2021-01-01 | Stop reason: HOSPADM

## 2021-01-01 RX ORDER — ALPRAZOLAM 0.25 MG/1
0.25 TABLET ORAL
Status: DISCONTINUED | OUTPATIENT
Start: 2021-01-01 | End: 2021-01-01

## 2021-01-01 RX ORDER — SODIUM CHLORIDE 0.9 % (FLUSH) 0.9 %
5-40 SYRINGE (ML) INJECTION AS NEEDED
Status: DISCONTINUED | OUTPATIENT
Start: 2021-01-01 | End: 2021-01-01 | Stop reason: SDUPTHER

## 2021-01-01 RX ORDER — DOXAZOSIN 1 MG/1
1 TABLET ORAL DAILY
Status: DISCONTINUED | OUTPATIENT
Start: 2021-01-01 | End: 2021-01-01 | Stop reason: HOSPADM

## 2021-01-01 RX ORDER — ACETAMINOPHEN 650 MG/1
650 SUPPOSITORY RECTAL
Status: DISCONTINUED | OUTPATIENT
Start: 2021-01-01 | End: 2021-01-01

## 2021-01-01 RX ORDER — AMPICILLIN 500 MG/1
500 CAPSULE ORAL
Qty: 28 CAP | Refills: 0 | Status: SHIPPED | OUTPATIENT
Start: 2021-01-01 | End: 2021-01-01

## 2021-01-01 RX ORDER — SODIUM CHLORIDE 0.9 % (FLUSH) 0.9 %
5-40 SYRINGE (ML) INJECTION AS NEEDED
Status: DISCONTINUED | OUTPATIENT
Start: 2021-01-01 | End: 2021-01-01 | Stop reason: HOSPADM

## 2021-01-01 RX ORDER — POLYETHYLENE GLYCOL 3350 17 G/17G
17 POWDER, FOR SOLUTION ORAL DAILY PRN
Status: DISCONTINUED | OUTPATIENT
Start: 2021-01-01 | End: 2021-01-01

## 2021-01-01 RX ORDER — BISACODYL 5 MG
5 TABLET, DELAYED RELEASE (ENTERIC COATED) ORAL DAILY PRN
Status: DISCONTINUED | OUTPATIENT
Start: 2021-01-01 | End: 2021-01-01

## 2021-01-01 RX ORDER — CALCIUM GLUCONATE 94 MG/ML
1 INJECTION, SOLUTION INTRAVENOUS ONCE
Status: COMPLETED | OUTPATIENT
Start: 2021-01-01 | End: 2021-01-01

## 2021-01-01 RX ORDER — MAGNESIUM SULFATE 100 %
4 CRYSTALS MISCELLANEOUS AS NEEDED
Status: DISCONTINUED | OUTPATIENT
Start: 2021-01-01 | End: 2021-01-01 | Stop reason: HOSPADM

## 2021-01-01 RX ORDER — SODIUM CHLORIDE 0.9 % (FLUSH) 0.9 %
5-10 SYRINGE (ML) INJECTION AS NEEDED
Status: DISCONTINUED | OUTPATIENT
Start: 2021-01-01 | End: 2021-01-01 | Stop reason: HOSPADM

## 2021-01-01 RX ORDER — DEXTROSE 50 % IN WATER (D50W) INTRAVENOUS SYRINGE
25 ONCE
Status: COMPLETED | OUTPATIENT
Start: 2021-01-01 | End: 2021-01-01

## 2021-01-01 RX ORDER — CIPROFLOXACIN 500 MG/1
500 TABLET ORAL 2 TIMES DAILY
Qty: 14 TAB | Refills: 0 | Status: SHIPPED | OUTPATIENT
Start: 2021-01-01 | End: 2021-01-01

## 2021-01-01 RX ORDER — VANCOMYCIN 2 GRAM/500 ML IN 0.9 % SODIUM CHLORIDE INTRAVENOUS
2000 ONCE
Status: COMPLETED | OUTPATIENT
Start: 2021-01-01 | End: 2021-01-01

## 2021-01-01 RX ORDER — QUETIAPINE FUMARATE 25 MG/1
25 TABLET, FILM COATED ORAL 2 TIMES DAILY
Status: DISCONTINUED | OUTPATIENT
Start: 2021-01-01 | End: 2021-01-01

## 2021-01-01 RX ORDER — FACIAL-BODY WIPES
10 EACH TOPICAL DAILY PRN
Status: DISCONTINUED | OUTPATIENT
Start: 2021-01-01 | End: 2021-01-01

## 2021-01-01 RX ORDER — ARFORMOTEROL TARTRATE 15 UG/2ML
15 SOLUTION RESPIRATORY (INHALATION)
Status: DISCONTINUED | OUTPATIENT
Start: 2021-01-01 | End: 2021-01-01

## 2021-01-01 RX ORDER — PROPOFOL 10 MG/ML
INJECTION, EMULSION INTRAVENOUS
Status: COMPLETED
Start: 2021-01-01 | End: 2021-01-01

## 2021-01-01 RX ORDER — DEXTROAMPHETAMINE SACCHARATE, AMPHETAMINE ASPARTATE, DEXTROAMPHETAMINE SULFATE AND AMPHETAMINE SULFATE 2.5; 2.5; 2.5; 2.5 MG/1; MG/1; MG/1; MG/1
20 TABLET ORAL DAILY
Status: DISCONTINUED | OUTPATIENT
Start: 2021-01-01 | End: 2021-01-01

## 2021-01-01 RX ORDER — DEXAMETHASONE 4 MG/1
TABLET ORAL
Qty: 36 TAB | Refills: 2 | Status: SHIPPED | OUTPATIENT
Start: 2021-01-01

## 2021-01-01 RX ORDER — BUDESONIDE 0.25 MG/2ML
500 INHALANT ORAL
Status: DISCONTINUED | OUTPATIENT
Start: 2021-01-01 | End: 2021-01-01

## 2021-01-01 RX ORDER — CLONAZEPAM 1 MG/1
0.5 TABLET ORAL
Status: DISCONTINUED | OUTPATIENT
Start: 2021-01-01 | End: 2021-01-01 | Stop reason: HOSPADM

## 2021-01-01 RX ORDER — MIDAZOLAM HYDROCHLORIDE 1 MG/ML
5 INJECTION, SOLUTION INTRAMUSCULAR; INTRAVENOUS ONCE
Status: COMPLETED | OUTPATIENT
Start: 2021-01-01 | End: 2021-01-01

## 2021-01-01 RX ORDER — HEPARIN 100 UNIT/ML
500 SYRINGE INTRAVENOUS AS NEEDED
Status: DISCONTINUED | OUTPATIENT
Start: 2021-01-01 | End: 2021-01-01 | Stop reason: HOSPADM

## 2021-01-01 RX ORDER — DEXAMETHASONE SODIUM PHOSPHATE 4 MG/ML
6 INJECTION, SOLUTION INTRA-ARTICULAR; INTRALESIONAL; INTRAMUSCULAR; INTRAVENOUS; SOFT TISSUE DAILY
Status: DISCONTINUED | OUTPATIENT
Start: 2021-01-01 | End: 2021-01-01

## 2021-01-01 RX ORDER — SODIUM CHLORIDE, SODIUM LACTATE, POTASSIUM CHLORIDE, CALCIUM CHLORIDE 600; 310; 30; 20 MG/100ML; MG/100ML; MG/100ML; MG/100ML
75 INJECTION, SOLUTION INTRAVENOUS CONTINUOUS
Status: DISCONTINUED | OUTPATIENT
Start: 2021-01-01 | End: 2021-01-01

## 2021-01-01 RX ORDER — ACETAMINOPHEN 325 MG/1
650 TABLET ORAL
Status: DISCONTINUED | OUTPATIENT
Start: 2021-01-01 | End: 2021-01-01

## 2021-01-01 RX ORDER — DOCUSATE SODIUM 50 MG/5ML
200 LIQUID ORAL 2 TIMES DAILY
Status: DISCONTINUED | OUTPATIENT
Start: 2021-01-01 | End: 2021-01-01

## 2021-01-01 RX ORDER — ADHESIVE BANDAGE
30 BANDAGE TOPICAL DAILY PRN
Status: DISCONTINUED | OUTPATIENT
Start: 2021-01-01 | End: 2021-01-01

## 2021-01-01 RX ORDER — DEXTROSE 50 % IN WATER (D50W) INTRAVENOUS SYRINGE
12.5-25 AS NEEDED
Status: DISCONTINUED | OUTPATIENT
Start: 2021-01-01 | End: 2021-01-01 | Stop reason: SDUPTHER

## 2021-01-01 RX ADMIN — LEVOTHYROXINE SODIUM 150 MCG: 0.15 TABLET ORAL at 05:05

## 2021-01-01 RX ADMIN — Medication 10 ML: at 18:56

## 2021-01-01 RX ADMIN — LEVOTHYROXINE SODIUM 150 MCG: 0.15 TABLET ORAL at 05:40

## 2021-01-01 RX ADMIN — Medication 1 AMPULE: at 20:02

## 2021-01-01 RX ADMIN — NOREPINEPHRINE BITARTRATE 10 MCG/MIN: 1 INJECTION, SOLUTION, CONCENTRATE INTRAVENOUS at 22:00

## 2021-01-01 RX ADMIN — METHYLPREDNISOLONE SODIUM SUCCINATE 60 MG: 125 INJECTION, POWDER, FOR SOLUTION INTRAMUSCULAR; INTRAVENOUS at 09:19

## 2021-01-01 RX ADMIN — ZINC SULFATE 220 MG (50 MG) CAPSULE 1 CAPSULE: CAPSULE at 08:51

## 2021-01-01 RX ADMIN — SODIUM CHLORIDE, POTASSIUM CHLORIDE, SODIUM LACTATE AND CALCIUM CHLORIDE 75 ML/HR: 600; 310; 30; 20 INJECTION, SOLUTION INTRAVENOUS at 19:32

## 2021-01-01 RX ADMIN — OXYCODONE HYDROCHLORIDE AND ACETAMINOPHEN 500 MG: 500 TABLET ORAL at 08:05

## 2021-01-01 RX ADMIN — LEVOTHYROXINE SODIUM 150 MCG: 0.15 TABLET ORAL at 05:17

## 2021-01-01 RX ADMIN — METHYLPREDNISOLONE SODIUM SUCCINATE 60 MG: 125 INJECTION, POWDER, FOR SOLUTION INTRAMUSCULAR; INTRAVENOUS at 09:08

## 2021-01-01 RX ADMIN — Medication 10 ML: at 14:38

## 2021-01-01 RX ADMIN — QUETIAPINE FUMARATE 25 MG: 25 TABLET ORAL at 17:56

## 2021-01-01 RX ADMIN — INSULIN LISPRO 2 UNITS: 100 INJECTION, SOLUTION INTRAVENOUS; SUBCUTANEOUS at 05:32

## 2021-01-01 RX ADMIN — SODIUM CHLORIDE 16.1 UNITS/HR: 9 INJECTION, SOLUTION INTRAVENOUS at 04:37

## 2021-01-01 RX ADMIN — I.V. FAT EMULSION 250 ML: 20 EMULSION INTRAVENOUS at 18:03

## 2021-01-01 RX ADMIN — HUMAN INSULIN 40 UNITS: 100 INJECTION, SUSPENSION SUBCUTANEOUS at 23:16

## 2021-01-01 RX ADMIN — INSULIN LISPRO 3 UNITS: 100 INJECTION, SOLUTION INTRAVENOUS; SUBCUTANEOUS at 18:57

## 2021-01-01 RX ADMIN — SODIUM CHLORIDE 25 ML/HR: 900 INJECTION, SOLUTION INTRAVENOUS at 10:45

## 2021-01-01 RX ADMIN — NOREPINEPHRINE BITARTRATE 30 MCG/MIN: 1 INJECTION, SOLUTION, CONCENTRATE INTRAVENOUS at 22:11

## 2021-01-01 RX ADMIN — INSULIN LISPRO 4 UNITS: 100 INJECTION, SOLUTION INTRAVENOUS; SUBCUTANEOUS at 06:44

## 2021-01-01 RX ADMIN — PROPOFOL 50 MCG/KG/MIN: 10 INJECTION, EMULSION INTRAVENOUS at 13:00

## 2021-01-01 RX ADMIN — Medication 10 ML: at 13:40

## 2021-01-01 RX ADMIN — BUDESONIDE 500 MCG: 0.25 INHALANT RESPIRATORY (INHALATION) at 00:20

## 2021-01-01 RX ADMIN — FAMOTIDINE 40 MG: 10 INJECTION, SOLUTION INTRAVENOUS at 16:31

## 2021-01-01 RX ADMIN — SERTRALINE HYDROCHLORIDE 50 MG: 50 TABLET, FILM COATED ORAL at 09:01

## 2021-01-01 RX ADMIN — Medication 1 AMPULE: at 08:40

## 2021-01-01 RX ADMIN — VANCOMYCIN HYDROCHLORIDE 1000 MG: 1 INJECTION, POWDER, LYOPHILIZED, FOR SOLUTION INTRAVENOUS at 23:20

## 2021-01-01 RX ADMIN — Medication 10 ML: at 15:44

## 2021-01-01 RX ADMIN — FAMOTIDINE 20 MG: 10 INJECTION INTRAVENOUS at 11:33

## 2021-01-01 RX ADMIN — Medication 10 ML: at 13:56

## 2021-01-01 RX ADMIN — OXYCODONE HYDROCHLORIDE AND ACETAMINOPHEN 500 MG: 500 TABLET ORAL at 09:01

## 2021-01-01 RX ADMIN — METHYLPREDNISOLONE SODIUM SUCCINATE 60 MG: 125 INJECTION, POWDER, FOR SOLUTION INTRAMUSCULAR; INTRAVENOUS at 08:19

## 2021-01-01 RX ADMIN — PALONOSETRON 0.25 MG: 0.05 INJECTION, SOLUTION INTRAVENOUS at 15:41

## 2021-01-01 RX ADMIN — INSULIN LISPRO 15 UNITS: 100 INJECTION, SOLUTION INTRAVENOUS; SUBCUTANEOUS at 23:16

## 2021-01-01 RX ADMIN — VANCOMYCIN HYDROCHLORIDE 1000 MG: 1 INJECTION, POWDER, LYOPHILIZED, FOR SOLUTION INTRAVENOUS at 14:35

## 2021-01-01 RX ADMIN — SODIUM CHLORIDE 3.6 UNITS/HR: 9 INJECTION, SOLUTION INTRAVENOUS at 04:18

## 2021-01-01 RX ADMIN — APIXABAN 5 MG: 5 TABLET, FILM COATED ORAL at 08:51

## 2021-01-01 RX ADMIN — METHYLPREDNISOLONE SODIUM SUCCINATE 60 MG: 125 INJECTION, POWDER, FOR SOLUTION INTRAMUSCULAR; INTRAVENOUS at 21:42

## 2021-01-01 RX ADMIN — CHLORHEXIDINE GLUCONATE 15 ML: 0.12 RINSE ORAL at 08:04

## 2021-01-01 RX ADMIN — Medication 10 ML: at 22:42

## 2021-01-01 RX ADMIN — CHLORHEXIDINE GLUCONATE 15 ML: 0.12 RINSE ORAL at 10:45

## 2021-01-01 RX ADMIN — BUDESONIDE 500 MCG: 0.25 INHALANT RESPIRATORY (INHALATION) at 07:38

## 2021-01-01 RX ADMIN — DOCUSATE SODIUM 200 MG: 50 LIQUID ORAL at 10:21

## 2021-01-01 RX ADMIN — Medication 1 AMPULE: at 10:45

## 2021-01-01 RX ADMIN — FAMOTIDINE 20 MG: 10 INJECTION INTRAVENOUS at 10:35

## 2021-01-01 RX ADMIN — ENOXAPARIN SODIUM 120 MG: 120 INJECTION SUBCUTANEOUS at 23:17

## 2021-01-01 RX ADMIN — DEXAMETHASONE SODIUM PHOSPHATE 12 MG: 4 INJECTION, SOLUTION INTRA-ARTICULAR; INTRALESIONAL; INTRAMUSCULAR; INTRAVENOUS; SOFT TISSUE at 10:54

## 2021-01-01 RX ADMIN — OXYCODONE HYDROCHLORIDE AND ACETAMINOPHEN 500 MG: 500 TABLET ORAL at 08:40

## 2021-01-01 RX ADMIN — GUAIFENESIN 600 MG: 600 TABLET, EXTENDED RELEASE ORAL at 08:40

## 2021-01-01 RX ADMIN — Medication 10 ML: at 21:01

## 2021-01-01 RX ADMIN — Medication 300 MCG/HR: at 11:07

## 2021-01-01 RX ADMIN — PROPOFOL 50 MCG/KG/MIN: 10 INJECTION, EMULSION INTRAVENOUS at 22:18

## 2021-01-01 RX ADMIN — PALONOSETRON 0.25 MG: 0.05 INJECTION, SOLUTION INTRAVENOUS at 10:47

## 2021-01-01 RX ADMIN — MAGNESIUM SULFATE HEPTAHYDRATE: 500 INJECTION, SOLUTION INTRAMUSCULAR; INTRAVENOUS at 18:00

## 2021-01-01 RX ADMIN — PROPOFOL 50 MCG/KG/MIN: 10 INJECTION, EMULSION INTRAVENOUS at 16:48

## 2021-01-01 RX ADMIN — Medication 300 MCG/HR: at 05:37

## 2021-01-01 RX ADMIN — INSULIN LISPRO 3 UNITS: 100 INJECTION, SOLUTION INTRAVENOUS; SUBCUTANEOUS at 18:00

## 2021-01-01 RX ADMIN — FAMOTIDINE 20 MG: 10 INJECTION INTRAVENOUS at 11:00

## 2021-01-01 RX ADMIN — Medication 10 ML: at 21:30

## 2021-01-01 RX ADMIN — PROPOFOL 50 MCG/KG/MIN: 10 INJECTION, EMULSION INTRAVENOUS at 07:57

## 2021-01-01 RX ADMIN — EPOPROSTENOL 30 NG/KG/MIN: 1.5 INJECTION, POWDER, LYOPHILIZED, FOR SOLUTION INTRAVENOUS at 01:05

## 2021-01-01 RX ADMIN — ENOXAPARIN SODIUM 120 MG: 120 INJECTION SUBCUTANEOUS at 21:36

## 2021-01-01 RX ADMIN — CHLORHEXIDINE GLUCONATE 15 ML: 0.12 RINSE ORAL at 22:15

## 2021-01-01 RX ADMIN — Medication 500 UNITS: at 12:12

## 2021-01-01 RX ADMIN — GABAPENTIN 200 MG: 100 CAPSULE ORAL at 21:56

## 2021-01-01 RX ADMIN — METHYLPREDNISOLONE SODIUM SUCCINATE 60 MG: 125 INJECTION, POWDER, FOR SOLUTION INTRAMUSCULAR; INTRAVENOUS at 20:58

## 2021-01-01 RX ADMIN — FENTANYL CITRATE 200 MCG: 50 INJECTION, SOLUTION INTRAMUSCULAR; INTRAVENOUS at 22:19

## 2021-01-01 RX ADMIN — PROPOFOL 50 MCG/KG/MIN: 10 INJECTION, EMULSION INTRAVENOUS at 02:20

## 2021-01-01 RX ADMIN — PACLITAXEL 197 MG: 6 INJECTION, SOLUTION INTRAVENOUS at 11:40

## 2021-01-01 RX ADMIN — Medication 10 ML: at 05:14

## 2021-01-01 RX ADMIN — SODIUM CHLORIDE 25 ML/HR: 900 INJECTION, SOLUTION INTRAVENOUS at 11:19

## 2021-01-01 RX ADMIN — SERTRALINE HYDROCHLORIDE 50 MG: 50 TABLET, FILM COATED ORAL at 08:19

## 2021-01-01 RX ADMIN — BUDESONIDE 500 MCG: 0.25 INHALANT RESPIRATORY (INHALATION) at 20:09

## 2021-01-01 RX ADMIN — MIDAZOLAM 1 MG/HR: 5 INJECTION INTRAMUSCULAR; INTRAVENOUS at 17:48

## 2021-01-01 RX ADMIN — LEVOTHYROXINE SODIUM 150 MCG: 0.15 TABLET ORAL at 05:32

## 2021-01-01 RX ADMIN — BUDESONIDE 500 MCG: 0.25 INHALANT RESPIRATORY (INHALATION) at 20:44

## 2021-01-01 RX ADMIN — PALONOSETRON 0.25 MG: 0.05 INJECTION, SOLUTION INTRAVENOUS at 11:12

## 2021-01-01 RX ADMIN — INSULIN LISPRO 3 UNITS: 100 INJECTION, SOLUTION INTRAVENOUS; SUBCUTANEOUS at 18:09

## 2021-01-01 RX ADMIN — GUAIFENESIN 600 MG: 600 TABLET, EXTENDED RELEASE ORAL at 21:17

## 2021-01-01 RX ADMIN — DIPHENHYDRAMINE HYDROCHLORIDE 50 MG: 50 INJECTION INTRAMUSCULAR; INTRAVENOUS at 10:35

## 2021-01-01 RX ADMIN — LEVOTHYROXINE SODIUM 150 MCG: 0.15 TABLET ORAL at 06:12

## 2021-01-01 RX ADMIN — ARFORMOTEROL TARTRATE 15 MCG: 15 SOLUTION RESPIRATORY (INHALATION) at 07:33

## 2021-01-01 RX ADMIN — ARFORMOTEROL TARTRATE 15 MCG: 15 SOLUTION RESPIRATORY (INHALATION) at 20:38

## 2021-01-01 RX ADMIN — SERTRALINE HYDROCHLORIDE 50 MG: 50 TABLET, FILM COATED ORAL at 09:00

## 2021-01-01 RX ADMIN — DOXAZOSIN 1 MG: 1 TABLET ORAL at 09:07

## 2021-01-01 RX ADMIN — Medication 10 ML: at 21:40

## 2021-01-01 RX ADMIN — PALONOSETRON 0.25 MG: 0.05 INJECTION, SOLUTION INTRAVENOUS at 15:00

## 2021-01-01 RX ADMIN — METHYLPREDNISOLONE SODIUM SUCCINATE 60 MG: 125 INJECTION, POWDER, FOR SOLUTION INTRAMUSCULAR; INTRAVENOUS at 21:37

## 2021-01-01 RX ADMIN — FAMOTIDINE 20 MG: 10 INJECTION INTRAVENOUS at 11:09

## 2021-01-01 RX ADMIN — MIDAZOLAM 10 MG/HR: 5 INJECTION INTRAMUSCULAR; INTRAVENOUS at 08:39

## 2021-01-01 RX ADMIN — PIPERACILLIN AND TAZOBACTAM 3.38 G: 3; .375 INJECTION, POWDER, LYOPHILIZED, FOR SOLUTION INTRAVENOUS at 00:54

## 2021-01-01 RX ADMIN — PROPOFOL 50 MCG/KG/MIN: 10 INJECTION, EMULSION INTRAVENOUS at 10:21

## 2021-01-01 RX ADMIN — HEPARIN 500 UNITS: 100 SYRINGE at 12:50

## 2021-01-01 RX ADMIN — PROCHLORPERAZINE EDISYLATE 5 MG: 5 INJECTION INTRAMUSCULAR; INTRAVENOUS at 10:39

## 2021-01-01 RX ADMIN — MIDAZOLAM HYDROCHLORIDE 5 MG: 1 INJECTION, SOLUTION INTRAMUSCULAR; INTRAVENOUS at 22:42

## 2021-01-01 RX ADMIN — PACLITAXEL 197 MG: 6 INJECTION, SOLUTION INTRAVENOUS at 15:52

## 2021-01-01 RX ADMIN — LEVOTHYROXINE SODIUM 150 MCG: 0.15 TABLET ORAL at 06:07

## 2021-01-01 RX ADMIN — Medication 1 AMPULE: at 08:50

## 2021-01-01 RX ADMIN — PROPOFOL 50 MCG/KG/MIN: 10 INJECTION, EMULSION INTRAVENOUS at 22:39

## 2021-01-01 RX ADMIN — CHLORHEXIDINE GLUCONATE 15 ML: 0.12 RINSE ORAL at 21:17

## 2021-01-01 RX ADMIN — HUMAN INSULIN 10 UNITS: 100 INJECTION, SUSPENSION SUBCUTANEOUS at 21:18

## 2021-01-01 RX ADMIN — MIDAZOLAM 10 MG/HR: 5 INJECTION INTRAMUSCULAR; INTRAVENOUS at 18:46

## 2021-01-01 RX ADMIN — ARFORMOTEROL TARTRATE 15 MCG: 15 SOLUTION RESPIRATORY (INHALATION) at 08:28

## 2021-01-01 RX ADMIN — DEXAMETHASONE SODIUM PHOSPHATE 12 MG: 4 INJECTION, SOLUTION INTRA-ARTICULAR; INTRALESIONAL; INTRAMUSCULAR; INTRAVENOUS; SOFT TISSUE at 11:22

## 2021-01-01 RX ADMIN — CHLORHEXIDINE GLUCONATE 15 ML: 0.12 RINSE ORAL at 09:00

## 2021-01-01 RX ADMIN — AZITHROMYCIN MONOHYDRATE 500 MG: 500 INJECTION, POWDER, LYOPHILIZED, FOR SOLUTION INTRAVENOUS at 02:37

## 2021-01-01 RX ADMIN — EPOPROSTENOL 20 NG/KG/MIN: 1.5 INJECTION, POWDER, LYOPHILIZED, FOR SOLUTION INTRAVENOUS at 17:43

## 2021-01-01 RX ADMIN — SODIUM CHLORIDE 25 ML/HR: 900 INJECTION, SOLUTION INTRAVENOUS at 11:09

## 2021-01-01 RX ADMIN — PACLITAXEL 197 MG: 6 INJECTION, SOLUTION INTRAVENOUS at 12:24

## 2021-01-01 RX ADMIN — DIPHENHYDRAMINE HYDROCHLORIDE 25 MG: 25 CAPSULE ORAL at 15:43

## 2021-01-01 RX ADMIN — Medication 10 ML: at 05:09

## 2021-01-01 RX ADMIN — DIPHENHYDRAMINE HYDROCHLORIDE 50 MG: 50 INJECTION INTRAMUSCULAR; INTRAVENOUS at 12:10

## 2021-01-01 RX ADMIN — SODIUM CHLORIDE 6.6 UNITS/HR: 9 INJECTION, SOLUTION INTRAVENOUS at 13:38

## 2021-01-01 RX ADMIN — FUROSEMIDE 20 MG: 40 TABLET ORAL at 09:07

## 2021-01-01 RX ADMIN — ARFORMOTEROL TARTRATE 15 MCG: 15 SOLUTION RESPIRATORY (INHALATION) at 08:56

## 2021-01-01 RX ADMIN — APIXABAN 5 MG: 5 TABLET, FILM COATED ORAL at 08:05

## 2021-01-01 RX ADMIN — CISATRACURIUM BESYLATE 22.84 MG: 2 INJECTION INTRAVENOUS at 23:46

## 2021-01-01 RX ADMIN — NOREPINEPHRINE BITARTRATE 6 MCG/MIN: 1 INJECTION, SOLUTION, CONCENTRATE INTRAVENOUS at 08:04

## 2021-01-01 RX ADMIN — HYDRALAZINE HYDROCHLORIDE 20 MG: 20 INJECTION INTRAMUSCULAR; INTRAVENOUS at 14:51

## 2021-01-01 RX ADMIN — Medication 300 MCG/HR: at 02:29

## 2021-01-01 RX ADMIN — SODIUM CHLORIDE, SODIUM LACTATE, POTASSIUM CHLORIDE, AND CALCIUM CHLORIDE 75 ML/HR: 600; 310; 30; 20 INJECTION, SOLUTION INTRAVENOUS at 04:39

## 2021-01-01 RX ADMIN — Medication 1 AMPULE: at 10:19

## 2021-01-01 RX ADMIN — CISATRACURIUM BESYLATE 3 MCG/KG/MIN: 10 INJECTION INTRAVENOUS at 00:11

## 2021-01-01 RX ADMIN — SODIUM CHLORIDE 3.4 UNITS/HR: 9 INJECTION, SOLUTION INTRAVENOUS at 23:25

## 2021-01-01 RX ADMIN — SODIUM CHLORIDE, POTASSIUM CHLORIDE, SODIUM LACTATE AND CALCIUM CHLORIDE 500 ML: 600; 310; 30; 20 INJECTION, SOLUTION INTRAVENOUS at 18:44

## 2021-01-01 RX ADMIN — I.V. FAT EMULSION 250 ML: 20 EMULSION INTRAVENOUS at 17:43

## 2021-01-01 RX ADMIN — PALONOSETRON 0.25 MG: 0.05 INJECTION, SOLUTION INTRAVENOUS at 10:18

## 2021-01-01 RX ADMIN — SERTRALINE HYDROCHLORIDE 50 MG: 50 TABLET, FILM COATED ORAL at 08:05

## 2021-01-01 RX ADMIN — ZINC SULFATE 220 MG (50 MG) CAPSULE 1 CAPSULE: CAPSULE at 09:01

## 2021-01-01 RX ADMIN — INSULIN LISPRO 4 UNITS: 100 INJECTION, SOLUTION INTRAVENOUS; SUBCUTANEOUS at 11:10

## 2021-01-01 RX ADMIN — ENOXAPARIN SODIUM 120 MG: 120 INJECTION SUBCUTANEOUS at 21:00

## 2021-01-01 RX ADMIN — Medication 10 ML: at 18:15

## 2021-01-01 RX ADMIN — DEXAMETHASONE SODIUM PHOSPHATE 12 MG: 4 INJECTION, SOLUTION INTRA-ARTICULAR; INTRALESIONAL; INTRAMUSCULAR; INTRAVENOUS; SOFT TISSUE at 16:26

## 2021-01-01 RX ADMIN — ARFORMOTEROL TARTRATE 15 MCG: 15 SOLUTION RESPIRATORY (INHALATION) at 00:20

## 2021-01-01 RX ADMIN — HUMAN INSULIN 10 UNITS: 100 INJECTION, SOLUTION SUBCUTANEOUS at 06:52

## 2021-01-01 RX ADMIN — IOHEXOL 50 ML: 240 INJECTION, SOLUTION INTRATHECAL; INTRAVASCULAR; INTRAVENOUS; ORAL at 11:05

## 2021-01-01 RX ADMIN — ACETAMINOPHEN 650 MG: 325 TABLET ORAL at 15:43

## 2021-01-01 RX ADMIN — SODIUM CHLORIDE 986 ML: 9 INJECTION, SOLUTION INTRAVENOUS at 05:15

## 2021-01-01 RX ADMIN — MIDAZOLAM 10 MG/HR: 5 INJECTION INTRAMUSCULAR; INTRAVENOUS at 16:50

## 2021-01-01 RX ADMIN — LEVOTHYROXINE SODIUM 150 MCG: 0.15 TABLET ORAL at 06:45

## 2021-01-01 RX ADMIN — METHYLPREDNISOLONE SODIUM SUCCINATE 60 MG: 125 INJECTION, POWDER, FOR SOLUTION INTRAMUSCULAR; INTRAVENOUS at 10:21

## 2021-01-01 RX ADMIN — PALONOSETRON 0.25 MG: 0.05 INJECTION, SOLUTION INTRAVENOUS at 10:35

## 2021-01-01 RX ADMIN — PROPOFOL 50 MCG/KG/MIN: 10 INJECTION, EMULSION INTRAVENOUS at 17:31

## 2021-01-01 RX ADMIN — Medication 10 ML: at 06:21

## 2021-01-01 RX ADMIN — PACLITAXEL 197 MG: 6 INJECTION, SOLUTION INTRAVENOUS at 11:28

## 2021-01-01 RX ADMIN — PACLITAXEL 197 MG: 6 INJECTION, SOLUTION INTRAVENOUS at 13:00

## 2021-01-01 RX ADMIN — SODIUM CHLORIDE 10 ML: 9 INJECTION INTRAMUSCULAR; INTRAVENOUS; SUBCUTANEOUS at 15:47

## 2021-01-01 RX ADMIN — SODIUM CHLORIDE 9.1 UNITS/HR: 9 INJECTION, SOLUTION INTRAVENOUS at 02:24

## 2021-01-01 RX ADMIN — CHLORHEXIDINE GLUCONATE 15 ML: 0.12 RINSE ORAL at 21:23

## 2021-01-01 RX ADMIN — VANCOMYCIN HYDROCHLORIDE 1000 MG: 1 INJECTION, POWDER, LYOPHILIZED, FOR SOLUTION INTRAVENOUS at 00:46

## 2021-01-01 RX ADMIN — Medication 10 ML: at 14:00

## 2021-01-01 RX ADMIN — DIPHENHYDRAMINE HYDROCHLORIDE 50 MG: 50 INJECTION INTRAMUSCULAR; INTRAVENOUS at 10:48

## 2021-01-01 RX ADMIN — DOCUSATE SODIUM 200 MG: 50 LIQUID ORAL at 12:37

## 2021-01-01 RX ADMIN — SODIUM CHLORIDE 7.2 UNITS/HR: 9 INJECTION, SOLUTION INTRAVENOUS at 01:30

## 2021-01-01 RX ADMIN — CISATRACURIUM BESYLATE 3 MCG/KG/MIN: 10 INJECTION INTRAVENOUS at 07:39

## 2021-01-01 RX ADMIN — Medication 300 MCG/HR: at 16:25

## 2021-01-01 RX ADMIN — OXYCODONE HYDROCHLORIDE AND ACETAMINOPHEN 500 MG: 500 TABLET ORAL at 08:19

## 2021-01-01 RX ADMIN — ONDANSETRON 4 MG: 2 INJECTION INTRAMUSCULAR; INTRAVENOUS at 09:19

## 2021-01-01 RX ADMIN — Medication 10 ML: at 10:56

## 2021-01-01 RX ADMIN — Medication 1 AMPULE: at 21:36

## 2021-01-01 RX ADMIN — SODIUM CHLORIDE 100 ML/HR: 4.5 INJECTION, SOLUTION INTRAVENOUS at 06:55

## 2021-01-01 RX ADMIN — Medication 10 ML: at 13:00

## 2021-01-01 RX ADMIN — PACLITAXEL 197 MG: 6 INJECTION, SOLUTION INTRAVENOUS at 16:58

## 2021-01-01 RX ADMIN — METHYLPREDNISOLONE SODIUM SUCCINATE 60 MG: 125 INJECTION, POWDER, FOR SOLUTION INTRAMUSCULAR; INTRAVENOUS at 22:14

## 2021-01-01 RX ADMIN — EPOPROSTENOL 30 NG/KG/MIN: 1.5 INJECTION, POWDER, LYOPHILIZED, FOR SOLUTION INTRAVENOUS at 01:14

## 2021-01-01 RX ADMIN — SODIUM CHLORIDE 25 ML/HR: 900 INJECTION, SOLUTION INTRAVENOUS at 09:35

## 2021-01-01 RX ADMIN — MIDAZOLAM 10 MG/HR: 5 INJECTION INTRAMUSCULAR; INTRAVENOUS at 03:55

## 2021-01-01 RX ADMIN — APIXABAN 5 MG: 5 TABLET, FILM COATED ORAL at 22:30

## 2021-01-01 RX ADMIN — Medication 10 ML: at 06:45

## 2021-01-01 RX ADMIN — INSULIN LISPRO 3 UNITS: 100 INJECTION, SOLUTION INTRAVENOUS; SUBCUTANEOUS at 06:12

## 2021-01-01 RX ADMIN — PROPOFOL 50 MCG/KG/MIN: 10 INJECTION, EMULSION INTRAVENOUS at 04:33

## 2021-01-01 RX ADMIN — Medication 300 MCG/HR: at 03:56

## 2021-01-01 RX ADMIN — DEXAMETHASONE SODIUM PHOSPHATE 12 MG: 4 INJECTION, SOLUTION INTRA-ARTICULAR; INTRALESIONAL; INTRAMUSCULAR; INTRAVENOUS; SOFT TISSUE at 10:55

## 2021-01-01 RX ADMIN — HEPARIN 500 UNITS: 100 SYRINGE at 13:45

## 2021-01-01 RX ADMIN — Medication 1 AMPULE: at 21:43

## 2021-01-01 RX ADMIN — Medication 1 AMPULE: at 08:38

## 2021-01-01 RX ADMIN — ONDANSETRON 4 MG: 2 INJECTION INTRAMUSCULAR; INTRAVENOUS at 10:29

## 2021-01-01 RX ADMIN — HUMAN INSULIN 30 UNITS: 100 INJECTION, SUSPENSION SUBCUTANEOUS at 21:00

## 2021-01-01 RX ADMIN — DIPHENHYDRAMINE HYDROCHLORIDE 50 MG: 50 INJECTION INTRAMUSCULAR; INTRAVENOUS at 11:25

## 2021-01-01 RX ADMIN — GUAIFENESIN 600 MG: 600 TABLET, EXTENDED RELEASE ORAL at 08:51

## 2021-01-01 RX ADMIN — PROPOFOL 50 MCG/KG/MIN: 10 INJECTION, EMULSION INTRAVENOUS at 14:17

## 2021-01-01 RX ADMIN — EPOPROSTENOL 30 NG/KG/MIN: 1.5 INJECTION, POWDER, LYOPHILIZED, FOR SOLUTION INTRAVENOUS at 04:51

## 2021-01-01 RX ADMIN — LEVOTHYROXINE SODIUM 150 MCG: 0.15 TABLET ORAL at 05:20

## 2021-01-01 RX ADMIN — DEXAMETHASONE SODIUM PHOSPHATE 12 MG: 4 INJECTION, SOLUTION INTRA-ARTICULAR; INTRALESIONAL; INTRAMUSCULAR; INTRAVENOUS; SOFT TISSUE at 10:31

## 2021-01-01 RX ADMIN — DEXAMETHASONE SODIUM PHOSPHATE 6 MG: 4 INJECTION, SOLUTION INTRAMUSCULAR; INTRAVENOUS at 08:04

## 2021-01-01 RX ADMIN — Medication 40 ML: at 21:42

## 2021-01-01 RX ADMIN — ENOXAPARIN SODIUM 120 MG: 120 INJECTION SUBCUTANEOUS at 09:00

## 2021-01-01 RX ADMIN — ZINC SULFATE 220 MG (50 MG) CAPSULE 1 CAPSULE: CAPSULE at 08:05

## 2021-01-01 RX ADMIN — PALONOSETRON 0.25 MG: 0.05 INJECTION, SOLUTION INTRAVENOUS at 14:55

## 2021-01-01 RX ADMIN — INSULIN LISPRO 2 UNITS: 100 INJECTION, SOLUTION INTRAVENOUS; SUBCUTANEOUS at 23:45

## 2021-01-01 RX ADMIN — BUDESONIDE 500 MCG: 0.25 INHALANT RESPIRATORY (INHALATION) at 20:26

## 2021-01-01 RX ADMIN — Medication 10 ML: at 14:18

## 2021-01-01 RX ADMIN — SODIUM CHLORIDE 10 ML: 9 INJECTION INTRAMUSCULAR; INTRAVENOUS; SUBCUTANEOUS at 16:55

## 2021-01-01 RX ADMIN — HUMAN INSULIN 40 UNITS: 100 INJECTION, SUSPENSION SUBCUTANEOUS at 08:50

## 2021-01-01 RX ADMIN — FAMOTIDINE 20 MG: 10 INJECTION, SOLUTION INTRAVENOUS at 09:35

## 2021-01-01 RX ADMIN — FAMOTIDINE 20 MG: 10 INJECTION INTRAVENOUS at 12:08

## 2021-01-01 RX ADMIN — Medication 300 MCG/HR: at 22:24

## 2021-01-01 RX ADMIN — Medication 10 ML: at 09:22

## 2021-01-01 RX ADMIN — SODIUM CHLORIDE 1000 ML: 9 INJECTION, SOLUTION INTRAVENOUS at 05:15

## 2021-01-01 RX ADMIN — SODIUM CHLORIDE 3.9 UNITS/HR: 9 INJECTION, SOLUTION INTRAVENOUS at 03:04

## 2021-01-01 RX ADMIN — VASOPRESSIN 0.04 UNITS/MIN: 20 INJECTION INTRAVENOUS at 12:39

## 2021-01-01 RX ADMIN — SODIUM CHLORIDE 5.7 UNITS/HR: 9 INJECTION, SOLUTION INTRAVENOUS at 02:26

## 2021-01-01 RX ADMIN — GABAPENTIN 200 MG: 100 CAPSULE ORAL at 21:42

## 2021-01-01 RX ADMIN — PROPOFOL 50 MCG/KG/MIN: 10 INJECTION, EMULSION INTRAVENOUS at 09:33

## 2021-01-01 RX ADMIN — VASOPRESSIN 0.04 UNITS/MIN: 20 INJECTION INTRAVENOUS at 10:06

## 2021-01-01 RX ADMIN — NOREPINEPHRINE BITARTRATE 45 MCG/MIN: 1 INJECTION, SOLUTION, CONCENTRATE INTRAVENOUS at 09:07

## 2021-01-01 RX ADMIN — ENOXAPARIN SODIUM 120 MG: 120 INJECTION SUBCUTANEOUS at 09:19

## 2021-01-01 RX ADMIN — AZITHROMYCIN MONOHYDRATE 500 MG: 500 INJECTION, POWDER, LYOPHILIZED, FOR SOLUTION INTRAVENOUS at 02:27

## 2021-01-01 RX ADMIN — ARFORMOTEROL TARTRATE 15 MCG: 15 SOLUTION RESPIRATORY (INHALATION) at 20:26

## 2021-01-01 RX ADMIN — HUMAN INSULIN 10 UNITS: 100 INJECTION, SUSPENSION SUBCUTANEOUS at 09:19

## 2021-01-01 RX ADMIN — EPOPROSTENOL 30 NG/KG/MIN: 1.5 INJECTION, POWDER, LYOPHILIZED, FOR SOLUTION INTRAVENOUS at 16:06

## 2021-01-01 RX ADMIN — FAMOTIDINE 20 MG: 10 INJECTION INTRAVENOUS at 16:15

## 2021-01-01 RX ADMIN — PROPOFOL 50 MCG/KG/MIN: 10 INJECTION, EMULSION INTRAVENOUS at 02:51

## 2021-01-01 RX ADMIN — HEPARIN 500 UNITS: 100 SYRINGE at 13:00

## 2021-01-01 RX ADMIN — Medication 300 MCG/HR: at 14:56

## 2021-01-01 RX ADMIN — INSULIN LISPRO 5 UNITS: 100 INJECTION, SOLUTION INTRAVENOUS; SUBCUTANEOUS at 22:29

## 2021-01-01 RX ADMIN — OXYCODONE HYDROCHLORIDE AND ACETAMINOPHEN 500 MG: 500 TABLET ORAL at 08:51

## 2021-01-01 RX ADMIN — DEXAMETHASONE SODIUM PHOSPHATE 12 MG: 4 INJECTION, SOLUTION INTRA-ARTICULAR; INTRALESIONAL; INTRAMUSCULAR; INTRAVENOUS; SOFT TISSUE at 15:57

## 2021-01-01 RX ADMIN — METHYLPREDNISOLONE SODIUM SUCCINATE 60 MG: 125 INJECTION, POWDER, FOR SOLUTION INTRAMUSCULAR; INTRAVENOUS at 10:43

## 2021-01-01 RX ADMIN — HUMAN INSULIN 20 UNITS: 100 INJECTION, SUSPENSION SUBCUTANEOUS at 11:54

## 2021-01-01 RX ADMIN — Medication 300 MCG/HR: at 01:31

## 2021-01-01 RX ADMIN — MIDAZOLAM HYDROCHLORIDE 4 MG: 1 INJECTION, SOLUTION INTRAMUSCULAR; INTRAVENOUS at 22:11

## 2021-01-01 RX ADMIN — DIPHENHYDRAMINE HYDROCHLORIDE 50 MG: 50 INJECTION INTRAMUSCULAR; INTRAVENOUS at 10:22

## 2021-01-01 RX ADMIN — PROPOFOL 25 MCG/KG/MIN: 10 INJECTION, EMULSION INTRAVENOUS at 05:43

## 2021-01-01 RX ADMIN — CEFTRIAXONE SODIUM 2 G: 2 INJECTION, POWDER, FOR SOLUTION INTRAMUSCULAR; INTRAVENOUS at 02:13

## 2021-01-01 RX ADMIN — Medication 10 ML: at 05:05

## 2021-01-01 RX ADMIN — Medication 1 AMPULE: at 09:19

## 2021-01-01 RX ADMIN — INSULIN LISPRO 3 UNITS: 100 INJECTION, SOLUTION INTRAVENOUS; SUBCUTANEOUS at 18:39

## 2021-01-01 RX ADMIN — Medication 10 ML: at 07:59

## 2021-01-01 RX ADMIN — PROPOFOL 50 MCG/KG/MIN: 10 INJECTION, EMULSION INTRAVENOUS at 22:43

## 2021-01-01 RX ADMIN — DIPHENHYDRAMINE HYDROCHLORIDE 50 MG: 50 INJECTION INTRAMUSCULAR; INTRAVENOUS at 16:20

## 2021-01-01 RX ADMIN — Medication 30 ML: at 05:41

## 2021-01-01 RX ADMIN — Medication 10 ML: at 06:26

## 2021-01-01 RX ADMIN — INSULIN LISPRO 14 UNITS: 100 INJECTION, SOLUTION INTRAVENOUS; SUBCUTANEOUS at 18:11

## 2021-01-01 RX ADMIN — CALCIUM GLUCONATE 1 G: 98 INJECTION, SOLUTION INTRAVENOUS at 06:53

## 2021-01-01 RX ADMIN — LEVOTHYROXINE SODIUM 150 MCG: 0.15 TABLET ORAL at 08:27

## 2021-01-01 RX ADMIN — PIPERACILLIN AND TAZOBACTAM 3.38 G: 3; .375 INJECTION, POWDER, LYOPHILIZED, FOR SOLUTION INTRAVENOUS at 09:09

## 2021-01-01 RX ADMIN — PROPOFOL 50 MCG/KG/MIN: 10 INJECTION, EMULSION INTRAVENOUS at 16:13

## 2021-01-01 RX ADMIN — OXYCODONE HYDROCHLORIDE AND ACETAMINOPHEN 500 MG: 500 TABLET ORAL at 10:44

## 2021-01-01 RX ADMIN — OXYCODONE HYDROCHLORIDE AND ACETAMINOPHEN 500 MG: 500 TABLET ORAL at 12:36

## 2021-01-01 RX ADMIN — Medication 10 ML: at 17:16

## 2021-01-01 RX ADMIN — Medication 275 MCG/HR: at 22:11

## 2021-01-01 RX ADMIN — PACLITAXEL 197 MG: 6 INJECTION, SOLUTION INTRAVENOUS at 10:51

## 2021-01-01 RX ADMIN — Medication 100 MCG/HR: at 22:17

## 2021-01-01 RX ADMIN — Medication 10 ML: at 12:30

## 2021-01-01 RX ADMIN — SERTRALINE 50 MG: 50 TABLET, FILM COATED ORAL at 22:34

## 2021-01-01 RX ADMIN — INSULIN LISPRO 2 UNITS: 100 INJECTION, SOLUTION INTRAVENOUS; SUBCUTANEOUS at 12:06

## 2021-01-01 RX ADMIN — PACLITAXEL 197 MG: 6 INJECTION, SOLUTION INTRAVENOUS at 16:17

## 2021-01-01 RX ADMIN — CLONAZEPAM 0.5 MG: 1 TABLET ORAL at 22:30

## 2021-01-01 RX ADMIN — SODIUM CHLORIDE 25 ML/HR: 900 INJECTION, SOLUTION INTRAVENOUS at 10:17

## 2021-01-01 RX ADMIN — Medication 1 AMPULE: at 22:39

## 2021-01-01 RX ADMIN — BUDESONIDE 500 MCG: 0.25 INHALANT RESPIRATORY (INHALATION) at 08:56

## 2021-01-01 RX ADMIN — BUDESONIDE 500 MCG: 0.25 INHALANT RESPIRATORY (INHALATION) at 19:38

## 2021-01-01 RX ADMIN — APIXABAN 5 MG: 5 TABLET, FILM COATED ORAL at 18:09

## 2021-01-01 RX ADMIN — PALONOSETRON 0.25 MG: 0.05 INJECTION, SOLUTION INTRAVENOUS at 11:21

## 2021-01-01 RX ADMIN — FUROSEMIDE 40 MG: 10 INJECTION, SOLUTION INTRAMUSCULAR; INTRAVENOUS at 11:02

## 2021-01-01 RX ADMIN — METHYLPREDNISOLONE SODIUM SUCCINATE 60 MG: 125 INJECTION, POWDER, FOR SOLUTION INTRAMUSCULAR; INTRAVENOUS at 20:59

## 2021-01-01 RX ADMIN — SODIUM CHLORIDE 8 UNITS/HR: 9 INJECTION, SOLUTION INTRAVENOUS at 03:37

## 2021-01-01 RX ADMIN — ARFORMOTEROL TARTRATE 15 MCG: 15 SOLUTION RESPIRATORY (INHALATION) at 07:27

## 2021-01-01 RX ADMIN — ONDANSETRON 4 MG: 2 INJECTION INTRAMUSCULAR; INTRAVENOUS at 01:49

## 2021-01-01 RX ADMIN — PROPOFOL 50 MCG/KG/MIN: 10 INJECTION, EMULSION INTRAVENOUS at 06:13

## 2021-01-01 RX ADMIN — VANCOMYCIN HYDROCHLORIDE 1000 MG: 1 INJECTION, POWDER, LYOPHILIZED, FOR SOLUTION INTRAVENOUS at 11:57

## 2021-01-01 RX ADMIN — HUMAN INSULIN 10 UNITS: 100 INJECTION, SUSPENSION SUBCUTANEOUS at 13:06

## 2021-01-01 RX ADMIN — Medication 200 MCG/HR: at 04:44

## 2021-01-01 RX ADMIN — INSULIN LISPRO 2 UNITS: 100 INJECTION, SOLUTION INTRAVENOUS; SUBCUTANEOUS at 13:42

## 2021-01-01 RX ADMIN — BUDESONIDE 500 MCG: 0.25 INHALANT RESPIRATORY (INHALATION) at 20:38

## 2021-01-01 RX ADMIN — CISATRACURIUM BESYLATE 2 MCG/KG/MIN: 10 INJECTION INTRAVENOUS at 08:05

## 2021-01-01 RX ADMIN — DEXAMETHASONE SODIUM PHOSPHATE 12 MG: 4 INJECTION, SOLUTION INTRA-ARTICULAR; INTRALESIONAL; INTRAMUSCULAR; INTRAVENOUS; SOFT TISSUE at 12:28

## 2021-01-01 RX ADMIN — Medication 1 AMPULE: at 21:55

## 2021-01-01 RX ADMIN — SODIUM CHLORIDE 10 ML: 9 INJECTION INTRAMUSCULAR; INTRAVENOUS; SUBCUTANEOUS at 17:00

## 2021-01-01 RX ADMIN — NOREPINEPHRINE BITARTRATE 13 MCG/MIN: 1 INJECTION, SOLUTION, CONCENTRATE INTRAVENOUS at 03:39

## 2021-01-01 RX ADMIN — APIXABAN 5 MG: 5 TABLET, FILM COATED ORAL at 18:36

## 2021-01-01 RX ADMIN — BUDESONIDE 500 MCG: 0.25 INHALANT RESPIRATORY (INHALATION) at 19:46

## 2021-01-01 RX ADMIN — Medication 1 AMPULE: at 20:58

## 2021-01-01 RX ADMIN — PROPOFOL 50 MCG/KG/MIN: 10 INJECTION, EMULSION INTRAVENOUS at 10:43

## 2021-01-01 RX ADMIN — VANCOMYCIN HYDROCHLORIDE 1000 MG: 1 INJECTION, POWDER, LYOPHILIZED, FOR SOLUTION INTRAVENOUS at 12:09

## 2021-01-01 RX ADMIN — Medication 10 ML: at 05:32

## 2021-01-01 RX ADMIN — LEVOTHYROXINE SODIUM 150 MCG: 0.15 TABLET ORAL at 06:52

## 2021-01-01 RX ADMIN — CEFTRIAXONE SODIUM 2 G: 2 INJECTION, POWDER, FOR SOLUTION INTRAMUSCULAR; INTRAVENOUS at 02:09

## 2021-01-01 RX ADMIN — HUMAN INSULIN 15 UNITS: 100 INJECTION, SUSPENSION SUBCUTANEOUS at 21:42

## 2021-01-01 RX ADMIN — SODIUM CHLORIDE 25 ML/HR: 9 INJECTION, SOLUTION INTRAVENOUS at 16:43

## 2021-01-01 RX ADMIN — DEXAMETHASONE SODIUM PHOSPHATE 12 MG: 4 INJECTION, SOLUTION INTRA-ARTICULAR; INTRALESIONAL; INTRAMUSCULAR; INTRAVENOUS; SOFT TISSUE at 11:59

## 2021-01-01 RX ADMIN — Medication 1 AMPULE: at 21:17

## 2021-01-01 RX ADMIN — Medication 10 ML: at 13:45

## 2021-01-01 RX ADMIN — FLUTICASONE PROPIONATE 2 SPRAY: 50 SPRAY, METERED NASAL at 09:08

## 2021-01-01 RX ADMIN — DEXAMETHASONE SODIUM PHOSPHATE 6 MG: 4 INJECTION, SOLUTION INTRAMUSCULAR; INTRAVENOUS at 08:52

## 2021-01-01 RX ADMIN — Medication 1 AMPULE: at 22:14

## 2021-01-01 RX ADMIN — GABAPENTIN 200 MG: 100 CAPSULE ORAL at 00:15

## 2021-01-01 RX ADMIN — SODIUM CHLORIDE, POTASSIUM CHLORIDE, SODIUM LACTATE AND CALCIUM CHLORIDE 500 ML: 600; 310; 30; 20 INJECTION, SOLUTION INTRAVENOUS at 09:38

## 2021-01-01 RX ADMIN — SODIUM CHLORIDE 100 ML/HR: 4.5 INJECTION, SOLUTION INTRAVENOUS at 11:00

## 2021-01-01 RX ADMIN — HEPARIN 500 UNITS: 100 SYRINGE at 15:47

## 2021-01-01 RX ADMIN — INSULIN LISPRO 5 UNITS: 100 INJECTION, SOLUTION INTRAVENOUS; SUBCUTANEOUS at 06:21

## 2021-01-01 RX ADMIN — PROPOFOL 50 MCG/KG/MIN: 10 INJECTION, EMULSION INTRAVENOUS at 01:31

## 2021-01-01 RX ADMIN — Medication 1 AMPULE: at 21:16

## 2021-01-01 RX ADMIN — ENOXAPARIN SODIUM 120 MG: 120 INJECTION SUBCUTANEOUS at 20:57

## 2021-01-01 RX ADMIN — DIPHENHYDRAMINE HYDROCHLORIDE 50 MG: 50 INJECTION INTRAMUSCULAR; INTRAVENOUS at 14:56

## 2021-01-01 RX ADMIN — FAMOTIDINE 20 MG: 10 INJECTION INTRAVENOUS at 15:42

## 2021-01-01 RX ADMIN — METHYLPREDNISOLONE SODIUM SUCCINATE 60 MG: 125 INJECTION, POWDER, FOR SOLUTION INTRAMUSCULAR; INTRAVENOUS at 08:50

## 2021-01-01 RX ADMIN — SODIUM CHLORIDE 250 ML: 900 INJECTION, SOLUTION INTRAVENOUS at 14:20

## 2021-01-01 RX ADMIN — ARFORMOTEROL TARTRATE 15 MCG: 15 SOLUTION RESPIRATORY (INHALATION) at 19:48

## 2021-01-01 RX ADMIN — PROPOFOL 50 MCG/KG/MIN: 10 INJECTION, EMULSION INTRAVENOUS at 03:40

## 2021-01-01 RX ADMIN — APIXABAN 5 MG: 5 TABLET, FILM COATED ORAL at 08:40

## 2021-01-01 RX ADMIN — HEPARIN 500 UNITS: 100 SYRINGE at 13:40

## 2021-01-01 RX ADMIN — PROPOFOL 50 MCG/KG/MIN: 10 INJECTION, EMULSION INTRAVENOUS at 08:37

## 2021-01-01 RX ADMIN — FAMOTIDINE 20 MG: 10 INJECTION INTRAVENOUS at 10:26

## 2021-01-01 RX ADMIN — ARFORMOTEROL TARTRATE 15 MCG: 15 SOLUTION RESPIRATORY (INHALATION) at 19:38

## 2021-01-01 RX ADMIN — METHYLPREDNISOLONE SODIUM SUCCINATE 60 MG: 125 INJECTION, POWDER, FOR SOLUTION INTRAMUSCULAR; INTRAVENOUS at 23:05

## 2021-01-01 RX ADMIN — HUMAN INSULIN 5 UNITS: 100 INJECTION, SUSPENSION SUBCUTANEOUS at 11:11

## 2021-01-01 RX ADMIN — METHYLPREDNISOLONE SODIUM SUCCINATE 60 MG: 125 INJECTION, POWDER, FOR SOLUTION INTRAMUSCULAR; INTRAVENOUS at 09:01

## 2021-01-01 RX ADMIN — FAMOTIDINE 20 MG: 10 INJECTION, SOLUTION INTRAVENOUS at 10:50

## 2021-01-01 RX ADMIN — Medication 300 MCG/HR: at 09:41

## 2021-01-01 RX ADMIN — FUROSEMIDE 80 MG: 10 INJECTION, SOLUTION INTRAMUSCULAR; INTRAVENOUS at 01:03

## 2021-01-01 RX ADMIN — ACETAMINOPHEN 650 MG: 325 TABLET ORAL at 08:27

## 2021-01-01 RX ADMIN — ARFORMOTEROL TARTRATE 15 MCG: 15 SOLUTION RESPIRATORY (INHALATION) at 19:47

## 2021-01-01 RX ADMIN — ARFORMOTEROL TARTRATE 15 MCG: 15 SOLUTION RESPIRATORY (INHALATION) at 08:01

## 2021-01-01 RX ADMIN — HEPARIN 500 UNITS: 100 SYRINGE at 12:30

## 2021-01-01 RX ADMIN — Medication 10 ML: at 09:15

## 2021-01-01 RX ADMIN — EPOPROSTENOL 30 NG/KG/MIN: 1.5 INJECTION, POWDER, LYOPHILIZED, FOR SOLUTION INTRAVENOUS at 15:30

## 2021-01-01 RX ADMIN — IOPAMIDOL 100 ML: 755 INJECTION, SOLUTION INTRAVENOUS at 11:04

## 2021-01-01 RX ADMIN — INSULIN LISPRO 3 UNITS: 100 INJECTION, SOLUTION INTRAVENOUS; SUBCUTANEOUS at 11:55

## 2021-01-01 RX ADMIN — DIPHENHYDRAMINE HYDROCHLORIDE 50 MG: 50 INJECTION INTRAMUSCULAR; INTRAVENOUS at 09:37

## 2021-01-01 RX ADMIN — METHYLPREDNISOLONE SODIUM SUCCINATE 60 MG: 125 INJECTION, POWDER, FOR SOLUTION INTRAMUSCULAR; INTRAVENOUS at 20:53

## 2021-01-01 RX ADMIN — AZITHROMYCIN MONOHYDRATE 500 MG: 500 INJECTION, POWDER, LYOPHILIZED, FOR SOLUTION INTRAVENOUS at 01:38

## 2021-01-01 RX ADMIN — SODIUM CHLORIDE 25 ML/HR: 900 INJECTION, SOLUTION INTRAVENOUS at 10:35

## 2021-01-01 RX ADMIN — Medication 10 ML: at 08:00

## 2021-01-01 RX ADMIN — ARFORMOTEROL TARTRATE 15 MCG: 15 SOLUTION RESPIRATORY (INHALATION) at 07:39

## 2021-01-01 RX ADMIN — ARFORMOTEROL TARTRATE 15 MCG: 15 SOLUTION RESPIRATORY (INHALATION) at 20:44

## 2021-01-01 RX ADMIN — MIDAZOLAM 10 MG/HR: 5 INJECTION INTRAMUSCULAR; INTRAVENOUS at 03:38

## 2021-01-01 RX ADMIN — INSULIN LISPRO 3 UNITS: 100 INJECTION, SOLUTION INTRAVENOUS; SUBCUTANEOUS at 23:58

## 2021-01-01 RX ADMIN — EPOPROSTENOL 30 NG/KG/MIN: 1.5 INJECTION, POWDER, LYOPHILIZED, FOR SOLUTION INTRAVENOUS at 03:51

## 2021-01-01 RX ADMIN — Medication 10 ML: at 05:03

## 2021-01-01 RX ADMIN — Medication 40 ML: at 22:00

## 2021-01-01 RX ADMIN — GUAIFENESIN 600 MG: 600 TABLET, EXTENDED RELEASE ORAL at 20:53

## 2021-01-01 RX ADMIN — FAMOTIDINE 20 MG: 10 INJECTION INTRAVENOUS at 10:21

## 2021-01-01 RX ADMIN — Medication 10 ML: at 13:14

## 2021-01-01 RX ADMIN — Medication 10 ML: at 21:56

## 2021-01-01 RX ADMIN — HUMAN INSULIN 40 UNITS: 100 INJECTION, SUSPENSION SUBCUTANEOUS at 21:39

## 2021-01-01 RX ADMIN — SODIUM CHLORIDE 5.4 UNITS/HR: 9 INJECTION, SOLUTION INTRAVENOUS at 08:01

## 2021-01-01 RX ADMIN — ZINC SULFATE 220 MG (50 MG) CAPSULE 1 CAPSULE: CAPSULE at 08:41

## 2021-01-01 RX ADMIN — ENOXAPARIN SODIUM 120 MG: 120 INJECTION SUBCUTANEOUS at 08:50

## 2021-01-01 RX ADMIN — ONDANSETRON 4 MG: 2 INJECTION INTRAMUSCULAR; INTRAVENOUS at 10:10

## 2021-01-01 RX ADMIN — ENOXAPARIN SODIUM 120 MG: 120 INJECTION SUBCUTANEOUS at 21:34

## 2021-01-01 RX ADMIN — ONDANSETRON 4 MG: 2 INJECTION INTRAMUSCULAR; INTRAVENOUS at 21:01

## 2021-01-01 RX ADMIN — METHYLPREDNISOLONE SODIUM SUCCINATE 60 MG: 125 INJECTION, POWDER, FOR SOLUTION INTRAMUSCULAR; INTRAVENOUS at 11:34

## 2021-01-01 RX ADMIN — FAMOTIDINE 20 MG: 10 INJECTION, SOLUTION INTRAVENOUS at 14:55

## 2021-01-01 RX ADMIN — PROPOFOL 50 MCG/KG/MIN: 10 INJECTION, EMULSION INTRAVENOUS at 20:03

## 2021-01-01 RX ADMIN — FAMOTIDINE 20 MG: 10 INJECTION INTRAVENOUS at 11:24

## 2021-01-01 RX ADMIN — ENOXAPARIN SODIUM 120 MG: 120 INJECTION SUBCUTANEOUS at 08:22

## 2021-01-01 RX ADMIN — SODIUM CHLORIDE, POTASSIUM CHLORIDE, SODIUM LACTATE AND CALCIUM CHLORIDE 40 ML/HR: 600; 310; 30; 20 INJECTION, SOLUTION INTRAVENOUS at 12:00

## 2021-01-01 RX ADMIN — GUAIFENESIN 600 MG: 600 TABLET, EXTENDED RELEASE ORAL at 21:42

## 2021-01-01 RX ADMIN — PROPOFOL 50 MCG/KG/MIN: 10 INJECTION, EMULSION INTRAVENOUS at 06:56

## 2021-01-01 RX ADMIN — INSULIN LISPRO 10 UNITS: 100 INJECTION, SOLUTION INTRAVENOUS; SUBCUTANEOUS at 01:00

## 2021-01-01 RX ADMIN — Medication 300 MCG/HR: at 07:44

## 2021-01-01 RX ADMIN — SODIUM CHLORIDE 13.3 UNITS/HR: 9 INJECTION, SOLUTION INTRAVENOUS at 08:36

## 2021-01-01 RX ADMIN — ARFORMOTEROL TARTRATE 15 MCG: 15 SOLUTION RESPIRATORY (INHALATION) at 09:10

## 2021-01-01 RX ADMIN — SODIUM CHLORIDE 25 ML/HR: 900 INJECTION, SOLUTION INTRAVENOUS at 16:15

## 2021-01-01 RX ADMIN — VANCOMYCIN HYDROCHLORIDE 1000 MG: 1 INJECTION, POWDER, LYOPHILIZED, FOR SOLUTION INTRAVENOUS at 23:27

## 2021-01-01 RX ADMIN — Medication 10 ML: at 16:40

## 2021-01-01 RX ADMIN — SODIUM CHLORIDE 10 ML: 9 INJECTION INTRAMUSCULAR; INTRAVENOUS; SUBCUTANEOUS at 09:22

## 2021-01-01 RX ADMIN — INSULIN LISPRO 3 UNITS: 100 INJECTION, SOLUTION INTRAVENOUS; SUBCUTANEOUS at 17:18

## 2021-01-01 RX ADMIN — VASOPRESSIN 0.04 UNITS/MIN: 20 INJECTION INTRAVENOUS at 03:25

## 2021-01-01 RX ADMIN — VANCOMYCIN HYDROCHLORIDE 1000 MG: 1 INJECTION, POWDER, LYOPHILIZED, FOR SOLUTION INTRAVENOUS at 00:05

## 2021-01-01 RX ADMIN — SODIUM CHLORIDE 25 ML/HR: 900 INJECTION, SOLUTION INTRAVENOUS at 12:00

## 2021-01-01 RX ADMIN — DOCUSATE SODIUM 200 MG: 50 LIQUID ORAL at 17:37

## 2021-01-01 RX ADMIN — VANCOMYCIN HYDROCHLORIDE 1000 MG: 1 INJECTION, POWDER, LYOPHILIZED, FOR SOLUTION INTRAVENOUS at 13:05

## 2021-01-01 RX ADMIN — Medication 10 ML: at 06:13

## 2021-01-01 RX ADMIN — SODIUM ZIRCONIUM CYCLOSILICATE 5 G: 5 POWDER, FOR SUSPENSION ORAL at 12:42

## 2021-01-01 RX ADMIN — DEXAMETHASONE SODIUM PHOSPHATE 12 MG: 4 INJECTION, SOLUTION INTRA-ARTICULAR; INTRALESIONAL; INTRAMUSCULAR; INTRAVENOUS; SOFT TISSUE at 15:30

## 2021-01-01 RX ADMIN — SODIUM CHLORIDE, SODIUM LACTATE, POTASSIUM CHLORIDE, AND CALCIUM CHLORIDE 75 ML/HR: 600; 310; 30; 20 INJECTION, SOLUTION INTRAVENOUS at 21:11

## 2021-01-01 RX ADMIN — ZINC SULFATE 220 MG (50 MG) CAPSULE 1 CAPSULE: CAPSULE at 12:37

## 2021-01-01 RX ADMIN — ARFORMOTEROL TARTRATE 15 MCG: 15 SOLUTION RESPIRATORY (INHALATION) at 08:34

## 2021-01-01 RX ADMIN — SODIUM CHLORIDE 25 ML/HR: 900 INJECTION, SOLUTION INTRAVENOUS at 14:24

## 2021-01-01 RX ADMIN — GABAPENTIN 300 MG: 300 CAPSULE ORAL at 22:30

## 2021-01-01 RX ADMIN — CISATRACURIUM BESYLATE 2.5 MCG/KG/MIN: 10 INJECTION INTRAVENOUS at 17:38

## 2021-01-01 RX ADMIN — SODIUM CHLORIDE 10 ML: 9 INJECTION INTRAMUSCULAR; INTRAVENOUS; SUBCUTANEOUS at 09:05

## 2021-01-01 RX ADMIN — HEPARIN SODIUM AND DEXTROSE 12 UNITS/KG/HR: 10000; 5 INJECTION INTRAVENOUS at 21:48

## 2021-01-01 RX ADMIN — INSULIN LISPRO 3 UNITS: 100 INJECTION, SOLUTION INTRAVENOUS; SUBCUTANEOUS at 12:00

## 2021-01-01 RX ADMIN — Medication 10 ML: at 15:47

## 2021-01-01 RX ADMIN — BUDESONIDE 500 MCG: 0.25 INHALANT RESPIRATORY (INHALATION) at 08:28

## 2021-01-01 RX ADMIN — Medication 1 AMPULE: at 11:22

## 2021-01-01 RX ADMIN — PROPOFOL 50 MCG/KG/MIN: 10 INJECTION, EMULSION INTRAVENOUS at 19:45

## 2021-01-01 RX ADMIN — PROPOFOL 50 MCG/KG/MIN: 10 INJECTION, EMULSION INTRAVENOUS at 14:38

## 2021-01-01 RX ADMIN — SODIUM BICARBONATE 100 MEQ: 84 INJECTION, SOLUTION INTRAVENOUS at 09:09

## 2021-01-01 RX ADMIN — SODIUM CHLORIDE 25 ML/HR: 900 INJECTION, SOLUTION INTRAVENOUS at 15:46

## 2021-01-01 RX ADMIN — SODIUM CHLORIDE 25 ML/HR: 900 INJECTION, SOLUTION INTRAVENOUS at 14:55

## 2021-01-01 RX ADMIN — PACLITAXEL 197 MG: 6 INJECTION, SOLUTION INTRAVENOUS at 15:28

## 2021-01-01 RX ADMIN — HYDRALAZINE HYDROCHLORIDE 20 MG: 20 INJECTION INTRAMUSCULAR; INTRAVENOUS at 09:05

## 2021-01-01 RX ADMIN — DEXAMETHASONE SODIUM PHOSPHATE 12 MG: 4 INJECTION, SOLUTION INTRA-ARTICULAR; INTRALESIONAL; INTRAMUSCULAR; INTRAVENOUS; SOFT TISSUE at 12:04

## 2021-01-01 RX ADMIN — PALONOSETRON 0.25 MG: 0.05 INJECTION, SOLUTION INTRAVENOUS at 12:04

## 2021-01-01 RX ADMIN — BUDESONIDE 500 MCG: 0.25 INHALANT RESPIRATORY (INHALATION) at 11:39

## 2021-01-01 RX ADMIN — Medication 10 ML: at 13:59

## 2021-01-01 RX ADMIN — PALONOSETRON 0.25 MG: 0.05 INJECTION, SOLUTION INTRAVENOUS at 09:36

## 2021-01-01 RX ADMIN — PROPOFOL 30 MCG/KG/MIN: 10 INJECTION, EMULSION INTRAVENOUS at 22:42

## 2021-01-01 RX ADMIN — PROPOFOL 50 MCG/KG/MIN: 10 INJECTION, EMULSION INTRAVENOUS at 00:00

## 2021-01-01 RX ADMIN — PALONOSETRON 0.25 MG: 0.05 INJECTION, SOLUTION INTRAVENOUS at 16:17

## 2021-01-01 RX ADMIN — ENOXAPARIN SODIUM 120 MG: 120 INJECTION SUBCUTANEOUS at 21:46

## 2021-01-01 RX ADMIN — Medication 10 ML: at 21:17

## 2021-01-01 RX ADMIN — Medication 10 ML: at 14:35

## 2021-01-01 RX ADMIN — BUDESONIDE 250 MCG: 0.25 INHALANT RESPIRATORY (INHALATION) at 08:34

## 2021-01-01 RX ADMIN — SODIUM PHOSPHATE, MONOBASIC, MONOHYDRATE AND SODIUM PHOSPHATE, DIBASIC, ANHYDROUS: 276; 142 INJECTION, SOLUTION INTRAVENOUS at 15:08

## 2021-01-01 RX ADMIN — PACLITAXEL 197 MG: 6 INJECTION, SOLUTION INTRAVENOUS at 11:52

## 2021-01-01 RX ADMIN — VANCOMYCIN HYDROCHLORIDE 2000 MG: 10 INJECTION, POWDER, LYOPHILIZED, FOR SOLUTION INTRAVENOUS at 11:38

## 2021-01-01 RX ADMIN — Medication 300 MCG/HR: at 11:10

## 2021-01-01 RX ADMIN — BUDESONIDE 500 MCG: 0.25 INHALANT RESPIRATORY (INHALATION) at 07:28

## 2021-01-01 RX ADMIN — ARFORMOTEROL TARTRATE 15 MCG: 15 SOLUTION RESPIRATORY (INHALATION) at 11:39

## 2021-01-01 RX ADMIN — DIPHENHYDRAMINE HYDROCHLORIDE 50 MG: 50 INJECTION INTRAMUSCULAR; INTRAVENOUS at 15:44

## 2021-01-01 RX ADMIN — INSULIN LISPRO 3 UNITS: 100 INJECTION, SOLUTION INTRAVENOUS; SUBCUTANEOUS at 00:11

## 2021-01-01 RX ADMIN — BUDESONIDE 500 MCG: 0.25 INHALANT RESPIRATORY (INHALATION) at 07:33

## 2021-01-01 RX ADMIN — GABAPENTIN 200 MG: 100 CAPSULE ORAL at 21:17

## 2021-01-01 RX ADMIN — SODIUM CHLORIDE, SODIUM LACTATE, POTASSIUM CHLORIDE, AND CALCIUM CHLORIDE 75 ML/HR: 600; 310; 30; 20 INJECTION, SOLUTION INTRAVENOUS at 11:38

## 2021-01-01 RX ADMIN — OXYCODONE HYDROCHLORIDE AND ACETAMINOPHEN 500 MG: 500 TABLET ORAL at 10:21

## 2021-01-01 RX ADMIN — Medication 10 ML: at 22:14

## 2021-01-01 RX ADMIN — ONDANSETRON 4 MG: 2 INJECTION INTRAMUSCULAR; INTRAVENOUS at 09:29

## 2021-01-01 RX ADMIN — DEXAMETHASONE SODIUM PHOSPHATE 12 MG: 4 INJECTION, SOLUTION INTRA-ARTICULAR; INTRALESIONAL; INTRAMUSCULAR; INTRAVENOUS; SOFT TISSUE at 15:01

## 2021-01-01 RX ADMIN — CHLORHEXIDINE GLUCONATE 15 ML: 0.12 RINSE ORAL at 21:00

## 2021-01-01 RX ADMIN — METHYLPREDNISOLONE SODIUM SUCCINATE 60 MG: 125 INJECTION, POWDER, FOR SOLUTION INTRAMUSCULAR; INTRAVENOUS at 21:34

## 2021-01-01 RX ADMIN — Medication 40 ML: at 06:39

## 2021-01-01 RX ADMIN — GUAIFENESIN 600 MG: 600 TABLET, EXTENDED RELEASE ORAL at 08:19

## 2021-01-01 RX ADMIN — GUAIFENESIN 600 MG: 600 TABLET, EXTENDED RELEASE ORAL at 23:45

## 2021-01-01 RX ADMIN — FAMOTIDINE 20 MG: 10 INJECTION INTRAVENOUS at 11:16

## 2021-01-01 RX ADMIN — PROPOFOL 50 MCG/KG/MIN: 10 INJECTION, EMULSION INTRAVENOUS at 19:36

## 2021-01-01 RX ADMIN — ENOXAPARIN SODIUM 120 MG: 120 INJECTION SUBCUTANEOUS at 22:14

## 2021-01-01 RX ADMIN — FUROSEMIDE 40 MG: 10 INJECTION, SOLUTION INTRAMUSCULAR; INTRAVENOUS at 06:25

## 2021-01-01 RX ADMIN — PROPOFOL 50 MCG/KG/MIN: 10 INJECTION, EMULSION INTRAVENOUS at 11:29

## 2021-01-01 RX ADMIN — BUDESONIDE 500 MCG: 0.25 INHALANT RESPIRATORY (INHALATION) at 08:02

## 2021-01-01 RX ADMIN — INSULIN LISPRO 3 UNITS: 100 INJECTION, SOLUTION INTRAVENOUS; SUBCUTANEOUS at 05:40

## 2021-01-01 RX ADMIN — FAMOTIDINE 20 MG: 10 INJECTION INTRAVENOUS at 14:26

## 2021-01-01 RX ADMIN — VANCOMYCIN HYDROCHLORIDE 1000 MG: 1 INJECTION, POWDER, LYOPHILIZED, FOR SOLUTION INTRAVENOUS at 00:12

## 2021-01-01 RX ADMIN — Medication 300 MCG/HR: at 20:00

## 2021-01-01 RX ADMIN — Medication 1 AMPULE: at 08:19

## 2021-01-01 RX ADMIN — HEPARIN 500 UNITS: 100 SYRINGE at 17:00

## 2021-01-01 RX ADMIN — VANCOMYCIN HYDROCHLORIDE 1000 MG: 1 INJECTION, POWDER, LYOPHILIZED, FOR SOLUTION INTRAVENOUS at 11:32

## 2021-01-01 RX ADMIN — DIPHENHYDRAMINE HYDROCHLORIDE 50 MG: 50 INJECTION INTRAMUSCULAR; INTRAVENOUS at 11:13

## 2021-01-01 RX ADMIN — CISATRACURIUM BESYLATE 2 MCG/KG/MIN: 10 INJECTION INTRAVENOUS at 00:47

## 2021-01-01 RX ADMIN — CISATRACURIUM BESYLATE 2 MCG/KG/MIN: 10 INJECTION INTRAVENOUS at 22:41

## 2021-01-01 RX ADMIN — HEPARIN 500 UNITS: 100 SYRINGE at 10:56

## 2021-01-01 RX ADMIN — INSULIN LISPRO 2 UNITS: 100 INJECTION, SOLUTION INTRAVENOUS; SUBCUTANEOUS at 23:37

## 2021-01-01 RX ADMIN — VASOPRESSIN 0.04 UNITS/MIN: 20 INJECTION INTRAVENOUS at 22:11

## 2021-01-01 RX ADMIN — Medication 1 AMPULE: at 08:04

## 2021-01-01 RX ADMIN — DIPHENHYDRAMINE HYDROCHLORIDE 50 MG: 50 INJECTION INTRAMUSCULAR; INTRAVENOUS at 14:28

## 2021-01-01 RX ADMIN — Medication 1 AMPULE: at 20:52

## 2021-01-01 RX ADMIN — CHLORHEXIDINE GLUCONATE 15 ML: 0.12 RINSE ORAL at 08:38

## 2021-01-01 RX ADMIN — Medication 300 MCG/HR: at 12:42

## 2021-01-01 RX ADMIN — Medication 1 AMPULE: at 09:01

## 2021-01-01 RX ADMIN — NOREPINEPHRINE BITARTRATE 30 MCG/MIN: 1 INJECTION, SOLUTION, CONCENTRATE INTRAVENOUS at 17:39

## 2021-01-01 RX ADMIN — MIDAZOLAM 2 MG/HR: 5 INJECTION INTRAMUSCULAR; INTRAVENOUS at 22:54

## 2021-01-01 RX ADMIN — FENTANYL CITRATE 200 MCG: 50 INJECTION, SOLUTION INTRAMUSCULAR; INTRAVENOUS at 22:38

## 2021-01-01 RX ADMIN — ZINC SULFATE 220 MG (50 MG) CAPSULE 1 CAPSULE: CAPSULE at 08:19

## 2021-01-01 RX ADMIN — VASOPRESSIN 0.04 UNITS/MIN: 20 INJECTION INTRAVENOUS at 18:53

## 2021-01-01 RX ADMIN — ZINC SULFATE 220 MG (50 MG) CAPSULE 1 CAPSULE: CAPSULE at 10:21

## 2021-01-01 RX ADMIN — MONTELUKAST 10 MG: 10 TABLET, FILM COATED ORAL at 22:30

## 2021-01-01 RX ADMIN — Medication 10 ML: at 21:18

## 2021-01-01 RX ADMIN — MIDAZOLAM HYDROCHLORIDE 4 MG: 1 INJECTION, SOLUTION INTRAMUSCULAR; INTRAVENOUS at 22:38

## 2021-01-01 RX ADMIN — EPOPROSTENOL 30 NG/KG/MIN: 1.5 INJECTION, POWDER, LYOPHILIZED, FOR SOLUTION INTRAVENOUS at 12:02

## 2021-01-01 RX ADMIN — INSULIN LISPRO 10 UNITS: 100 INJECTION, SOLUTION INTRAVENOUS; SUBCUTANEOUS at 12:05

## 2021-01-01 RX ADMIN — SODIUM CHLORIDE 2.6 UNITS/HR: 9 INJECTION, SOLUTION INTRAVENOUS at 12:26

## 2021-01-01 RX ADMIN — INSULIN LISPRO 10 UNITS: 100 INJECTION, SOLUTION INTRAVENOUS; SUBCUTANEOUS at 06:20

## 2021-01-01 RX ADMIN — NOREPINEPHRINE BITARTRATE 9 MCG/MIN: 1 INJECTION, SOLUTION, CONCENTRATE INTRAVENOUS at 10:39

## 2021-01-01 RX ADMIN — SERTRALINE HYDROCHLORIDE 50 MG: 50 TABLET, FILM COATED ORAL at 08:41

## 2021-01-01 RX ADMIN — DEXTROSE MONOHYDRATE 25 G: 25 INJECTION, SOLUTION INTRAVENOUS at 06:53

## 2021-01-01 RX ADMIN — Medication 10 ML: at 13:46

## 2021-01-01 RX ADMIN — HEPARIN 500 UNITS: 100 SYRINGE at 18:15

## 2021-01-01 RX ADMIN — GABAPENTIN 200 MG: 100 CAPSULE ORAL at 21:37

## 2021-01-01 RX ADMIN — INSULIN LISPRO 3 UNITS: 100 INJECTION, SOLUTION INTRAVENOUS; SUBCUTANEOUS at 17:23

## 2021-01-01 RX ADMIN — HUMAN INSULIN 30 UNITS: 100 INJECTION, SUSPENSION SUBCUTANEOUS at 09:01

## 2021-01-01 RX ADMIN — NOREPINEPHRINE BITARTRATE 20 MCG/MIN: 1 INJECTION, SOLUTION, CONCENTRATE INTRAVENOUS at 13:24

## 2021-01-01 RX ADMIN — APIXABAN 5 MG: 5 TABLET, FILM COATED ORAL at 09:08

## 2021-01-01 RX ADMIN — GABAPENTIN 200 MG: 100 CAPSULE ORAL at 21:00

## 2021-01-01 RX ADMIN — SERTRALINE HYDROCHLORIDE 50 MG: 50 TABLET, FILM COATED ORAL at 08:51

## 2021-01-01 RX ADMIN — BUDESONIDE 250 MCG: 0.25 INHALANT RESPIRATORY (INHALATION) at 19:49

## 2021-01-01 RX ADMIN — HEPARIN 500 UNITS: 100 SYRINGE at 16:55

## 2021-01-01 RX ADMIN — DEXAMETHASONE SODIUM PHOSPHATE 6 MG: 4 INJECTION, SOLUTION INTRAMUSCULAR; INTRAVENOUS at 08:16

## 2021-01-01 RX ADMIN — SODIUM CHLORIDE 100 ML/HR: 4.5 INJECTION, SOLUTION INTRAVENOUS at 21:15

## 2021-01-01 RX ADMIN — PACLITAXEL 197 MG: 6 INJECTION, SOLUTION INTRAVENOUS at 12:38

## 2021-01-01 RX ADMIN — SODIUM CHLORIDE 10 ML: 9 INJECTION INTRAMUSCULAR; INTRAVENOUS; SUBCUTANEOUS at 12:50

## 2021-01-01 RX ADMIN — HEPARIN 500 UNITS: 100 SYRINGE at 16:40

## 2021-01-01 RX ADMIN — Medication 300 MCG/HR: at 17:37

## 2021-01-01 RX ADMIN — Medication 10 ML: at 12:13

## 2021-01-01 RX ADMIN — TOCILIZUMAB 800 MG: 20 INJECTION, SOLUTION, CONCENTRATE INTRAVENOUS at 13:59

## 2021-01-01 RX ADMIN — BUDESONIDE 500 MCG: 0.25 INHALANT RESPIRATORY (INHALATION) at 09:10

## 2021-01-01 RX ADMIN — HUMAN INSULIN 15 UNITS: 100 INJECTION, SUSPENSION SUBCUTANEOUS at 08:19

## 2021-01-01 RX ADMIN — NOREPINEPHRINE BITARTRATE 9 MCG/MIN: 1 INJECTION, SOLUTION, CONCENTRATE INTRAVENOUS at 02:37

## 2021-01-01 RX ADMIN — LEVOTHYROXINE SODIUM 150 MCG: 0.15 TABLET ORAL at 06:44

## 2021-01-01 RX ADMIN — CEFTRIAXONE SODIUM 2 G: 2 INJECTION, POWDER, FOR SOLUTION INTRAMUSCULAR; INTRAVENOUS at 01:34

## 2021-01-01 RX ADMIN — ARFORMOTEROL TARTRATE 15 MCG: 15 SOLUTION RESPIRATORY (INHALATION) at 20:09

## 2021-01-06 NOTE — PROGRESS NOTES
27 [facility], [address] [place] 
P [phone]  F [phone] Office Visit Patient ID: 
Name: Fara Anderson MRN: [id number] : 1960/60 y.o. Visit date: 2021 LEONID Astudillo is an established patient 62 y.o.  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 The patient's previous treatment include primary debulking and adjuvant Taxol/carbo with retreatment in  for recurrence. Subsequent lines of chemo as outlined below. She has most recently started single agent weekly Taxol on 18 following progression with chest wall involvement while on PARPi. Most recent EOD imaging in 2020 shows a variable response to her prior therapy though with mostly increased disease including liver, mediastium and abd with progressive right hydronephrosis. She is s/p ureteral stent placement.   
   
 
OncTx History: 
12: MARAH/BSO/Cytoreductive surgery on 12 noted carcinomatosis/omental caking. 10/2012-2013: 6 cycles Carbo/Taxol 2014-2014: 6 Cycles carbo/Taxol 3/2015-5/13/15: 3 cycles Avastin/Doxil until progression 2015-2015 Weekly Topotecan  
2015-3/2016: Weekly Topotecan 2017-2017Lee Rebeca D1/D8 Q28 days for 6 cycles 2018: CT core needle bx chest wall mass: Metastatic high-grade serous carcinoma (see comment) Positive for malignancy. 2018-2018: Taxol weekly 2019: 10 fx 30cGy palliative RT to sternal lesion 2019 - 2020 [place]. Underwent right ureteral stent placement on 3/18/19. 
2020: CT CAP variable response to therapy with several lesions decreased in size. However a majority of the lesions have increased in size consistent with progression of disease with involvement of the mediastinum, liver masses, and peritoneal carcinomatosis. Increase right-sided hydronephrosis. Increasing . 8/2020 - initiated Taxol weekly 10/2020 - RLE DVT on eliquis CT Results (most recent): 
Results from [facility] Encounter encounter on 06/24/20 CT ABD PELV W CONT Narrative EXAM: CT CHEST W CONT, CT ABD PELV W CONT INDICATION: Ovarian cancer COMPARISON: 10/2/2019 CONTRAST: 100 mL of Isovue-370. TECHNIQUE:  
Following the uneventful intravenous administration of contrast, thin axial 
images were obtained through the chest, abdomen and pelvis. Coronal and sagittal 
reconstructions were generated. Oral contrast was administered. CT dose 
reduction was achieved through use of a standardized protocol tailored for this 
examination and automatic exposure control for dose modulation. FINDINGS:  
 
THYROID: No nodule. MEDIASTINUM: Left paratracheal adenopathy now measures 3.7 x 2.0 cm increased in 
size. Mass in AP window which extends superiorly into the left infraclavicular 
region and inferiorly into the left hilum is also increase in size. When 
measured in a similar fashion this measures 3.5 x 2.4 previously 3.0 x 2.9 cm. Right parasternal mass is decreased in size measuring 3.4 x 3.4 cm. Subcarinal 
adenopathy measures 2.8 x 2.3 cm increased in size. There is increased size of 
the nodular component adjacent to the xiphoid now measuring 2.6 x 3.3 cm. SWATI: Left hilar lymph node measures 3.2 x 2.4 cm previously 2.5 x 1.8 cm. THORACIC AORTA: No dissection or aneurysm. MAIN PULMONARY ARTERY: Normal in caliber. TRACHEA/BRONCHI: Patent. ESOPHAGUS: No wall thickening or dilatation. HEART: Normal in size. PLEURA: No effusion or pneumothorax. LUNGS: No nodule, mass, or airspace disease. LIVER: Multiple masses are noted and better visualized on current study 
secondary to the addition of intravenous contrast. Comparison lesions measure 
previously, there is a 3.5 x 2.8 cm mass in the right hepatic lobe series 3 
image 58 slightly increased in size. There is a 3.6 x 2.9 cm mass in the left hepatic lobe slightly decreased in size series 3 image 56. Left hepatic lobe 
mass series 3 image 45 measuring 4.0 x 2.8 cm slightly increased in size. There 
is extensive tumor infiltration in the liver hilum with narrowing of the main 
portal vein as well as the intrahepatic portal veins. The common hepatic artery 
is not well visualized and likely occluded with some collateral flow noted in 
the liver hilum. GALLBLADDER: Not visualized SPLEEN: Mass in the splenic hilum increased in size measuring 7.4 x 4.3 cm with 
invasion into the splenic hilum. PANCREAS: The pancreas is encased by tumor. ADRENALS: There is likely involvement of the left adrenal gland tumor. KIDNEYS: Right nephroureteral stent with mild to moderate right-sided 
hydronephrosis. STOMACH: Tumor encasing the gastric antrum. SMALL BOWEL: No dilatation or wall thickening. COLON: Tumor abutting the tumor encasing the rectosigmoid junction. Tumor 
abutting the transverse colon. APPENDIX: Unremarkable. PERITONEUM: Some free fluid noted in the pelvis. Increased size of mesenteric 
masses. Representative examples: 7.8 x 4.3 cm tumor anterior peritoneum series 3 
image 84. No pelvic tumor 7.5 x 4.8 cm series 3 image 100. RETROPERITONEUM:  
Right pelvic sidewall increased in size. Large right lateral lymph node 
measuring 3.1 x 2.0 cm not significantly changed. REPRODUCTIVE ORGANS: The visualized URINARY BLADDER: No mass or calculus. BONES: No destructive bone lesion. ADDITIONAL COMMENTS: N/A Impression IMPRESSION: 
1. There is some variable response to therapy with several lesions decreased in 
size. However a majority of the lesions have increased in size consistent with 
progression of disease with involvement of the mediastinum, liver masses, and 
peritoneal carcinomatosis. 2.  Increase right-sided hydronephrosis. Jennifer Dye CT 10/2019: 
FINDINGS: 
THYROID: Visualized gland is unremarkable. MEDIASTINUM: Upper left paratracheal node measures 2.2 x 3.4 cm (2, 8), 
previously 2.3 x 3.5 cm. Superior prevascular node measures 2.9 x 3.0 cm, 
previously 3.5 x 3.8 cm. Poorly delineated subcarinal node measures about 3.4 x 
4.8 cm, previously 3.6 x 4.8 cm. Precardiac nodes measure 1.9 x 2.5 cm and 1.7 x 
2.4 cm (2, 39), previously 2.1 x 2.7 cm and 2.0 x 2.0 cm. SWATI: No mass or lymphadenopathy. THORACIC AORTA: No dissection or aneurysm. MAIN PULMONARY ARTERY: Normal in caliber. TRACHEA/BRONCHI: Patent. ESOPHAGUS: No wall thickening or dilatation. HEART: Normal in size. PLEURA: No effusion or pneumothorax. LUNGS: No nodule, mass, or airspace disease. LIVER: Multiple hypodense lesions suspicious for metastases, measuring up to 2.5 
x 4.0 cm in segment 2 (2, 43) series, previously 2.8 x 3.7 cm, 3.2 x 4.6 cm in 
segment 3 (2, 55), previously 3.1 x 4.5 cm, and partially exophytic from the 
posterior inferior right lobe segment 6 measuring 2.5 x 3.5 cm, previously 2.8 x 
3.4 cm. GALLBLADDER: Unremarkable. SPLEEN: Central 1.9 x 3.1 cm hypodensity (2, 47), previously 1.9 x 2.3 cm. Otherwise unremarkable. PANCREAS: Inseparable from bulky central abdominal adenopathy with no identified 
ductal dilation. ADRENALS: Unremarkable. KIDNEYS: Right hydronephrosis has improved status post right nephroureteral 
stent placement. Kidneys otherwise appear unremarkable. STOMACH: Unremarkable. SMALL BOWEL: No dilatation or wall thickening. COLON: No dilation or wall thickening. APPENDIX: Unremarkable. PERITONEUM: Extensive peritoneal implants with representative largest lesions 
measuring 4.4 x 6.1 cm and the left adnexal region (2, 98), previously 4.3 x 5.7 
cm, 3.8 x 5.9 cm in the anterior midline (2, 81), previously 3.4 x 5.4 cm, and 
2.8 x 4.7 cm in the right abdomen (2, 78), previously 3.2 x 4.7 cm. RETROPERITONEUM: Extensive rocio hepatis, retroperitoneal, and pelvic sidewall adenopathy. Conglomerate nkechi mass in the rocio hepatis region measures 5.7 x 
11.1 cm (2, 58) is, previously 6.3 x 12.2 cm. Aortocaval node measures 3.7 x 5.3 
cm (2, 77), previously 4.5 x 5.4 cm. Right pelvic sidewall node measures 2.9 x 
4.2 cm (2, 103), previously 3.4 x 4.2 cm. Left external iliac node measures 3.0 
x 4.8 cm (2, 101), previously 3.3 x 4.8 cm. REPRODUCTIVE ORGANS: Uterus and ovaries are surgically absent. URINARY BLADDER: No mass or calculus. Distal end of right nephroureteral stent 
positioned within bladder lumen. BONES: Degenerative spine change. No acute fracture or aggressive lesion. ADDITIONAL COMMENTS: Mass centered at the right sternocostal junction measures 5.3 x 5.4 cm (2, 21), previously 4.8 x 5.7 cm. Post surgical changes in the 
anterior abdominal wall. Left Port-A-Cath in place. Bilateral inguinal 
adenopathy measuring 2.1 x 3.2 cm on the right (2, 115), previously 2.3 x 3.7 cm 
and 2.7 x 2.8 cm on the left (2, 108), previously 2.9 x 3.1 cm. . 
  
IMPRESSION: Overall response to disease with either decrease in size or no 
significant change in extensive nkechi metastases, hepatic metastases, right 
sternal metastasis, and peritoneal carcinomatosis as above. No new metastasis or 
progressive metastasis identified. Status post right nephroureteral stent with 
interval resolution of right hydronephrosis.  
 
 
SUBJECTIVE 1/6/2021: 
 Presents to [facility], feeling very well. She received a blood transfusion last week. She reports doing well at home. Dysuria resolved s/p recent UTI. She still has spotting/red tinged with some voiding. She had cystoscopy with stent xchange in October with \"manipulation. \"  She has a visit with her urologist next week. She remains on eliquis for persistent RLE DVT. Swelling/discomfort has subsided. No CP/SOB. Remains fatigued but continues to exercise by riding stationary bike daily and light strength workouts with her son. She denies any abd/chest/back pain currently. She ambulates at home with rare rollator or cane use, mostly if right knee pain. Goes up/down stairs slowly and unassisted currently. No weakness/light headedness. No falls. No N/V. Appetite stable. She drinks 4 bottles water/day with gatorade. +BM daily to every other day with miralax. Takes tramadol nightly for generalized discomforts. Two adult sons at home who cook for her. She is also fosters a 15 y/o boy, previously two boys. She is contemplating adopting him. Confirms if she passes, her son Satish will adopt him. She has discussed this with the adoption agency. She has chronic pedal/hand numbness/tingling due to taxol and DM. She wakes from sleep occasionally d/t this but resumes sleep easily. Follows with podiatrist. Denies any change in depression, continues medication. ROS 10 pt negative except as per above PMH: 
Past Medical History:  
Diagnosis Date  Adverse effect of anesthesia   
 sleep apnea cpap machine useage  Anemia  Arthritis DDD low back and knees  Asthma   
 uses albuterol prn only; none x 6 mos. Never hospitalized  BRCA negative 1/2013  Calculus of kidney  Chronic kidney disease   
 kidney stones  Chronic pain   
 knee pain bilat  CINV (chemotherapy-induced nausea and vomiting) 8/21/2014  Decreased hearing, right PATIENT STATES THIS IS DUE TO CHEMOTHERAPY  Diabetes ([facility] [address] Age 46 IDDM  Environmental allergies  Fibromyalgia  GERD (gastroesophageal reflux disease)   
 controlled with med  Hx of pulmonary embolus during pregnancy 2015  Hydronephrosis due to obstruction of ureter 3/18/2019  Hypertension  Ill-defined condition Sepsis  -  SOURCE PORT  
 Ill-defined condition 2016  
 chemotherapy  Mass of chest wall 2018  Morbid obesity ([facility] [address]  Murmur, heart  Other and unspecified hyperlipidemia  Ovarian cancer ([facility] [address] 2012, 2014  
 serous, high grade, stage IIIc. s/p chemotx (has port)  Psychiatric disorder Depression/Anxiety  Rectal bleeding  Thromboembolus ([facility] [address]  POST PARTUM; resolved- PELVIS  Thyroid disease HYPOTHYROID  Unspecified sleep apnea CPAP, Dr. Terrance Young PSH: 
Past Surgical History:  
Procedure Laterality Date   
  X2  
 HX HYSTERECTOMY  2012 ROULA/BSO, tumor debulking, omentectomy for ovarian cancer  HX ORTHOPAEDIC Right   
 ankle-FX, R  
 HX ORTHOPAEDIC Right   
 FX R ARM  
 HX UROLOGICAL Right  STENT FOR KIDNEY STONES  
 HX VASCULAR ACCESS  2015  
 right chest- port placed  HX VASCULAR ACCESS Left 2017 TETE CATH  
 IA BREAST SURGERY PROCEDURE UNLISTED Lymph node in left breast 2017 SOC: 
Social History Socioeconomic History  Marital status:  Spouse name: Not on file  Number of children: Not on file  Years of education: Not on file  Highest education level: Not on file Occupational History  Not on file Social Needs  Financial resource strain: Not on file  Food insecurity Worry: Not on file Inability: Not on file  Transportation needs Medical: Not on file Non-medical: Not on file Tobacco Use  Smoking status: Never Smoker  Smokeless tobacco: Never Used Substance and Sexual Activity  Alcohol use:  Yes  
 Comment: 1 drink per month  Drug use: No  
 Sexual activity: Yes Lifestyle  Physical activity Days per week: Not on file Minutes per session: Not on file  Stress: Not on file Relationships  Social connections Talks on phone: Not on file Gets together: Not on file Attends [employer] service: Not on file Active member of club or organization: Not on file Attends meetings of clubs or organizations: Not on file Relationship status: Not on file  Intimate partner violence Fear of current or ex partner: Not on file Emotionally abused: Not on file Physically abused: Not on file Forced sexual activity: Not on file Other Topics Concern  Not on file Social History Narrative Lives in [facility] alone.  passed away in 5/16 of a heart attack. Has 2 sons. Disability. 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 Enjoys swimming and going to the gym. Family History Family History Problem Relation Age of Onset  Hypertension Mother [facility] Arthritis-osteo Mother  Hypertension Father  Arthritis-osteo Father  Cancer Father PROSTATE  COPD Father  Hypertension Brother  Elevated Lipids Brother  Arthritis-osteo Brother  Hypertension Brother  Elevated Lipids Brother  Obesity Brother  Cancer Brother PROSTATE  Anesth Problems Son DELAYED AWAKENING  
 Diabetes Maternal Grandmother  Anesth Problems Paternal Grandmother PATIENT STATES GRANDMOTHER'S HEART STOPPED DURING SURGERY Medications: (reviewed) Current Outpatient Medications on File Prior to Encounter Medication Sig Dispense Refill  traMADoL (ULTRAM) 50 mg tablet Take 1 Tab by mouth every eight (8) hours as needed for Pain for up to 30 days. Max Daily Amount: 150 mg.  Indications: neuropathic pain, pain 30 Tab 0  
  omega-3 acid ethyl esters (LOVAZA) 1 gram capsule TAKE 1 CAPSULE BY MOUTH TWICE A  Cap 2  
 rosuvastatin (CRESTOR) 10 mg tablet Take 10 mg by mouth nightly.  doxazosin (CARDURA) 1 mg tablet Take  by mouth daily.  doxazosin (CARDURA) 1 mg tablet Take 1 mg by mouth daily.  SULFAMETHOXAZOLE-TRIMETHOPRIM PO Take  by mouth two (2) times a day. 7 days  dexAMETHasone (DECADRON) 4 mg tablet Take 2 tablets with breakfast the day before chemo and for 2 days after chemo 36 Tab 2  
 lidocaine-prilocaine (EMLA) topical cream Apply small amount over port area and cover with band aid one hour before chemo 30 g 3  
 ondansetron (ZOFRAN ODT) 4 mg disintegrating tablet Take 1 Tab by mouth every eight (8) hours as needed for Nausea. Indications: nausea and vomiting caused by cancer drugs 30 Tab 2  predniSONE (DELTASONE) 50 mg tablet Take 1 tablet 13 hours prior to scan, take 1 tablet 7 hours prior to scan and take 1 tablet one hour prior to scan 3 Tab 0  
 fluticasone propionate (FLONASE) 50 mcg/actuation nasal spray 2 SPRAYS IN BOTH NOSTRILS DAILY 1 Bottle 0  
 cycloSPORINE (RESTASIS) 0.05 % dpet  albuterol (PROVENTIL VENTOLIN) 2.5 mg /3 mL (0.083 %) nebu Take 2.5 mg by inhalation.  dextroamphetamine-amphetamine (ADDERALL) 20 mg tablet TAKE 1 TABLET BY MOUTH EVERY DAY  0  
 rosuvastatin (CRESTOR) 10 mg tablet TAKE 1 TABLET BY MOUTH EVERY DAY  0  
 telmisartan (MICARDIS) 20 mg tablet TAKE 1 TABLET BY MOUTH EVERY DAY 90 Tab 3  
 atorvastatin (LIPITOR) 20 mg tablet  BD ULTRA-FINE SHORT PEN NEEDLE 31 gauge x 5/16\" ndle USE TO INJECT INSULIN 5 TIMES DAILY 200 Pen Needle 11  
 insulin aspart U-100 (NOVOLOG FLEXPEN U-100 INSULIN) 100 unit/mL (3 mL) inpn INJECT 20 UNITS BEFORE EACH MEAL + 4 UNITS FOR EVERY 50 MG/DL ABOVE 150 MG/DL.   UNITS PER DAY 90 Adjustable Dose Pre-filled Pen Syringe 3  
  LEVEMIR FLEXTOUCH U-100 INSULN 100 unit/mL (3 mL) inpn INJECT 30 UNITS IN AM AND 50 UNITS AT NIGHT. INCREASE AS DIRECTED TO  UNITS/DAY. 90 Adjustable Dose Pre-filled Pen Syringe 3  DEXILANT 60 mg CpDB capsule (delayed release) TAKE 1 CAPSULE BY MOUTH EVERY DAY 90 Cap 3  
 LINZESS 145 mcg cap capsule TAKE 1 CAPSULE BY MOUTH EVERY DAYY 30 Cap 0  
 levothyroxine (SYNTHROID) 150 mcg tablet TAKE 1 TABLET BY MOUTH EVERY DAY BEFORE BREAKFAST 90 Tab 0  
 EPINEPHrine (EPIPEN) 0.3 mg/0.3 mL injection INJECT 0.3 ML BY INTRAMUSCULAR ROUTE ONCE AS NEEDED FOR UP TO 1 DOSE.  1  
 glucose blood VI test strips (TRUE METRIX GLUCOSE TEST STRIP) strip by Does Not Apply route See Admin Instructions. 30 Strip 5  
 lancets misc by Does Not Apply route daily. True Metrix lancets- test blood sugar once per day 30 Each 5  Blood-Glucose Meter monitoring kit Check up to tid, as directed 1 Kit 0  
 azelastine (ASTELIN) 137 mcg (0.1 %) nasal spray 1 [place] by Both Nostrils route daily as needed. Use in each nostril as directed  insulin detemir U-100 (LEVEMIR) 100 unit/mL injection 20 Units by SubCUTAneous route daily (with lunch).  montelukast (SINGULAIR) 10 mg tablet Take 10 mg by mouth nightly.  ascorbic acid, vitamin C, (VITAMIN C) 1,000 mg tablet Take 1,000 mg by mouth daily.  albuterol (PROVENTIL VENTOLIN) 2.5 mg /3 mL (0.083 %) nebulizer solution 2.5 mg by Nebulization route every four (4) hours as needed for Wheezing.  Liraglutide (VICTOZA) 0.6 mg/0.1 mL (18 mg/3 mL) pnij 1.8 mg by SubCUTAneous route daily (with lunch). 3 Pen 3  
 SYMBICORT 160-4.5 mcg/actuation HFAA Take 2 Puffs by inhalation two (2) times daily as needed. 2 Inhaler 3  clonazePAM (KLONOPIN) 0.5 mg tablet Take 0.5 mg by mouth nightly as needed.  sertraline (ZOLOFT) 50 mg tablet TAKE 1 TABLET BY MOUTH as needed  1  cholecalciferol, VITAMIN D3, (VITAMIN D3) 5,000 unit tab tablet Take 5,000 Units by mouth daily.  furosemide (LASIX) 40 mg tablet Take 40 mg by mouth every other day. 3 No current facility-administered medications on file prior to encounter. Allergies: (reviewed) Allergies Allergen Reactions  Carboplatin Other (comments) Patient developed shortness of breath, felt faint, coughing, skin flushed and \"itchy\". This occurred on the patients 8th treatment.  Iodinated Contrast Media Rash 13 hr pre-medication prior to IV contrast.  
Patient has done well with 1 hr pre-medication of (Solu-Medrol) 40mg &  (Benadryl) 50mg.  Lipitor [Atorvastatin] Myalgia  Shellfish Containing Products Hives  Tape [Adhesive] Itching and Other (comments) \"op site-- clear,thin tape\"--caused blister  Tomato Hives  Iodine And Iodide Containing Products Rash  Olmesartan Other (comments)  Losartan Other (comments)  
  headaches  Percocet [Oxycodone-Acetaminophen] Other (comments) Fever; however, current home med OBJECTIVE Physical Exam 
Visit Vitals BP (!) 177/74 Pulse 74 Temp 96.8 °F (36 °C) Ht 5' 8\" (1.727 m) Wt 271 lb (122.9 kg) LMP 09/07/2011 BMI 41.21 kg/m² GENERAL BRIAN: Conversant, pleasant, alert, oriented. NAD HEENT: Oropharynx clear. Sclera anicteric. No JVD. No adenopathy RESPIRATORY: Lung CTA, No rales/rhonchi. Adequate respiratory effort. CARDIOVASC: Reg rate/rhythm, stable murmur low grade. No visible chest lesion. GASTROINT: soft, non-tender, without masses or organomegaly, no fluid wave. Habitus limits exam.  
EXTREMITIES: bilat 1+ edema/girth nonpitting < RLE. Lightly tender R ankle w/o erythema. Foot warm, mobile, sensate. ALCON SURVEY: Negative NEURO: Grossly intact, no acute deficit. Oriented. Not depressed. Not agitated. Wt Readings from Last 3 Encounters:  
01/06/21 271 lb (122.9 kg) 12/30/20 270 lb (122.5 kg) 12/22/20 273 lb (123.8 kg) DATE REVIEW as available: 
Lab Results Component Value Date/Time WBC 8.0 01/06/2021 11:15 AM  
 Hemoglobin (POC) 10.5 (L) 03/20/2017 12:15 PM  
 HGB 6.3 (L) 01/06/2021 11:15 AM  
 Hematocrit (POC) 31 (L) 03/20/2017 12:15 PM  
 HCT 20.7 (L) 01/06/2021 11:15 AM  
 PLATELET 563 99/[birth year]/1957 11:15 AM  
 MCV 95.8 01/06/2021 11:15 AM  
 
Lab Results Component Value Date/Time  
 ABS. NEUTROPHILS 5.4 01/06/2021 11:15 AM  
 
Lab Results Component Value Date/Time Sodium 139 01/06/2021 11:15 AM  
 Potassium 4.1 01/06/2021 11:15 AM  
 Chloride 110 (H) 01/06/2021 11:15 AM  
 CO2 24 01/06/2021 11:15 AM  
 Anion gap 5 01/06/2021 11:15 AM  
 Glucose 176 (H) 01/06/2021 11:15 AM  
 BUN 25 (H) 01/06/2021 11:15 AM  
 Creatinine 1.31 (H) 01/06/2021 11:15 AM  
 BUN/Creatinine ratio 19 01/06/2021 11:15 AM  
 GFR est AA 50 (L) 01/06/2021 11:15 AM  
 GFR est non-AA 42 (L) 01/06/2021 11:15 AM  
 Calcium 9.0 01/06/2021 11:15 AM  
 Bilirubin, total 0.6 12/29/2020 05:02 PM  
 Alk. phosphatase 60 12/29/2020 05:02 PM  
 Protein, total 7.1 12/29/2020 05:02 PM  
 Albumin 2.9 (L) 12/29/2020 05:02 PM  
 Globulin 4.2 (H) 12/29/2020 05:02 PM  
 A-G Ratio 0.7 (L) 12/29/2020 05:02 PM  
 ALT (SGPT) 30 12/29/2020 05:02 PM  
 
 
CA-125 Date Value Ref Range Status 12/29/2020 46 (H) 1.5 - 35.0 U/mL Final  
  Comment:  
  ** Note new reference range and method ** Results may vary depending on . Method used is [employer] 
  
12/03/2020 48 (H) 1.5 - 35.0 U/mL Final  
  Comment:  
  ** Note new reference range and method ** Results may vary depending on . Method used is [employer] 
  
11/05/2020 52 (H) 1.5 - 35.0 U/mL Final  
  Comment:  
  ** Note new reference range and method ** Results may vary depending on . Method used is [employer] 10/08/2020 116 (H) 1.5 - 35.0 U/mL Final  
  Comment:  
  ** Note new reference range and method ** Results may vary depending on . Method used is [employer] 09/03/2020 187 (H) 1.5 - 35.0 U/mL Final  
  Comment:  
  ** Note new reference range and method ** Results may vary depending on . Method used is [employer] ECHO 1/2020 · Normal cavity size and systolic function (ejection fraction normal). Mild concentric hypertrophy. Estimated left ventricular ejection fraction is 55 - 60%. Visually measured ejection fraction. No regional wall motion abnormality noted. · Mildly dilated left atrium. · Mild aortic valve sclerosis. · Trace mitral valve regurgitation is present. IMPRESSION AND PLAN: 
62 y.o. with diffusely metastatic ovarian cancer. Continues on palliative chemo, retrial taxol Labs/chart reviewed C6 Taxol, ongoing. Responsive chemically. CKD stage 2-3 - stable. Continue home hydration. Right hydronephrosis - stent in place. Exchanged October 2020. Ongoing hematuria, asked that she d/w her urologist. She is on [facility]. Liver mets - asx Pain d/t disease/neuropathy -tramadol RLE DVT acute/chronic - stable on eliquis. If Issues persist or PE present, consider lovenox/filter. Consider risk/benefit of [address] with persistent anemia. Anemia - persistent d/t multiple causes with urologic losses. No CVD, signs of End organ dysfunction. VSS and relatively asx. Discussed transfusions with her, she agrees to wait. SOB/debility - Improved. Hx Asthma, chronic due to condition, anemia. IDDM2/SHABANA/depression - controlled overall. CPAP, though sleep study pending CIPN/diabetic polyneuropathy - not in pain, monitor. Discussed home safety. Wt loss, prot smiley malnutrition - supplements/alimentation as best as able. Psychosocial - she has some fear about the future, currently very pleased with progress. She is well supported, isaac remains very important. Monitor depressive sx mgmt. Discussed home life and plans for the future. Plans for adopting her foster child with support of her son Satish. 01/06/21 I have spent 30 minutes reviewing previous notes, test results and face to face with the patient discussing the diagnosis and importance of compliance with the treatment plan. Daniella Huston PA-C 
1/6/2021

## 2021-01-07 NOTE — PROGRESS NOTES
[facility] Chemo Progress Note Date: 2021 Name: Nimesh Bearden MRN: [id number] : 1960 
 
1100 Ms. Christiano Waggoner Arrived to [facility] for  Taxol (L2S1) ambulatory in stable condition. Assessment was completed, no acute issues at this time, no new complaints voiced. Port  accessed with positive blood return. Labs drawn and sent for processing. Port flushed, curos cap applied to end clave. [address] [place] [address] within parameters for treatment. All medications ordered from pharmacy. Port connected to [employer]. Patient denies SOB, fever, cough, general not feeling well. Patient denies recent exposure to someone who has tested positive for COVID-19. Patient denies having contact with anyone who has a pending COVID test.   
 
Chemotherapy Flowsheet 2021 Cycle C6D8 Date 2021 Drug / Regimen Taxol Dosage -  
Pre Hydration -  
Pre Meds given Notes given Ms. Moore's vitals were reviewed. Patient Vitals for the past 12 hrs: 
 Temp Pulse BP  
21 1634  74 (!) 177/74  
21 1112 96.8 °F (36 °C) 89 122/72 Lab results were obtained and reviewed. Recent Results (from the past 12 hour(s)) CBC WITH AUTOMATED DIFF Collection Time: 21 11:15 AM  
Result Value Ref Range WBC 8.0 3.6 - 11.0 K/uL  
 RBC 2.16 (L) 3.80 - 5.20 M/uL HGB 6.3 (L) 11.5 - 16.0 g/dL HCT 20.7 (L) 35.0 - 47.0 % MCV 95.8 80.0 - 99.0 FL  
 MCH 29.2 26.0 - 34.0 PG  
 MCHC 30.4 30.0 - 36.5 g/dL RDW 20.6 (H) 11.5 - 14.5 % PLATELET 355 820 - 813 K/uL MPV 9.2 8.9 - 12.9 FL  
 NRBC 1.3 (H) 0  WBC ABSOLUTE NRBC 0.10 (H) 0.00 - 0.01 K/uL NEUTROPHILS 68 32 - 75 % LYMPHOCYTES 19 12 - 49 % MONOCYTES 7 5 - 13 % EOSINOPHILS 2 0 - 7 % BASOPHILS 1 0 - 1 % IMMATURE GRANULOCYTES 3 (H) 0.0 - 0.5 % ABS. NEUTROPHILS 5.4 1.8 - 8.0 K/UL  
 ABS. LYMPHOCYTES 1.5 0.8 - 3.5 K/UL  
 ABS. MONOCYTES 0.6 0.0 - 1.0 K/UL  
 ABS. EOSINOPHILS 0.2 0.0 - 0.4 K/UL ABS. BASOPHILS 0.1 0.0 - 0.1 K/UL  
 ABS. IMM. GRANS. 0.2 (H) 0.00 - 0.04 K/UL  
 DF SMEAR SCANNED    
 RBC COMMENTS OVALOCYTES PRESENT 
    
 RBC COMMENTS ANISOCYTOSIS 
1+ METABOLIC PANEL, BASIC Collection Time: 01/06/21 11:15 AM  
Result Value Ref Range Sodium 139 136 - 145 mmol/L Potassium 4.1 3.5 - 5.1 mmol/L Chloride 110 (H) 97 - 108 mmol/L  
 CO2 24 21 - 32 mmol/L Anion gap 5 5 - 15 mmol/L Glucose 176 (H) 65 - 100 mg/dL BUN 25 (H) 6 - 20 MG/DL Creatinine 1.31 (H) 0.55 - 1.02 MG/DL  
 BUN/Creatinine ratio 19 12 - 20 GFR est AA 50 (L) >60 ml/min/1.73m2 GFR est non-AA 42 (L) >60 ml/min/1.73m2 Calcium 9.0 8.5 - 10.1 MG/DL Pre-medications  were administered as ordered and chemotherapy was initiated. Medications Administered 0.9% sodium chloride infusion Admin Date 01/06/2021 Action New Bag Dose 25 mL/hr Rate 25 mL/hr Route IntraVENous Administered By 
Marie Jackson RN  
  
  
 dexamethasone (DECADRON) 12 mg in 0.9% sodium chloride 50 mL, overfill volume 5 mL IVPB Admin Date 01/06/2021 Action Given Dose 
12 mg Rate 232 mL/hr Route IntraVENous Administered By 
Marie Jackson RN  
  
  
 diphenhydrAMINE (BENADRYL) injection 50 mg   
 Admin Date 01/06/2021 Action Given Dose 50 mg Route IntraVENous Administered By 
Marie Jackson RN  
  
  
 famotidine (PF) (PEPCID) 20 mg in 0.9% sodium chloride 10 mL injection Admin Date 01/06/2021 Action Given Dose 
20 mg Route IntraVENous Administered By 
Marie Jackson RN  
  
  
 heparin (porcine) pf 300-500 Units Admin Date 01/06/2021 Action Given Dose 
500 Units Route InterCATHeter Administered By 
Marie Jackson RN  
  
  
 PACLitaxeL (TAXOL) 197 mg in 0.9% sodium chloride 250 mL, overfill volume 25 mL chemo infusion Admin Date 01/06/2021 Action New Bag Dose 
197 mg Rate 
307.8 mL/hr Route IntraVENous Administered By 
Marie Jackson RN  
  
  
 palonosetron HCl (ALOXI) injection 0.25 mg   
 Admin Date 01/06/2021 Action Given Dose 0.25 mg Route IntraVENous Administered By 
Riana Phelan RN  
  
  
 saline peripheral flush soln 10 mL Admin Date 01/06/2021 Action Given Dose 
10 mL Route InterCATHeter Administered By 
Riana Phelan, NIKIA  
  
  
  
 
[id number] Patient tolerated treatment well. Port maintained positive blood return throughout treatment. Port flushed, heparinized and de accessed per protocol. Patient was discharged from [facility] ambulatory in no distress. Patient is aware of next appointment on 1/13/2021. Future Appointments Date Time Provider Gabriel Phillip 1/13/2021  8:00 AM [address]  
1/20/2021 10:00 AM [facility] PSYCHIATRIC [place] [facility] ER 1 [facility]  
1/26/2021 11:30 AM Brayden Becerril MD [facility] Mateo Rubalcava RN 
January 6, 2021

## 2021-01-13 NOTE — PROGRESS NOTES
[address] [facility], [address] [place], [facility] 
P [phone]  F [phone] Office Visit Patient ID: 
Name: Ann Smith MRN: [id number] : 1960/60 y.o. Visit date: 2021 LEONID Ross is an established patient 62 y.o.  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 The patient's previous treatment include primary debulking and adjuvant Taxol/carbo with retreatment in  for recurrence. Subsequent lines of chemo as outlined below. She has most recently started single agent weekly Taxol on 18 following progression with chest wall involvement while on PARPi. Most recent EOD imaging in 2020 shows a variable response to her prior therapy though with mostly increased disease including liver, mediastium and abd with progressive right hydronephrosis. She is s/p ureteral stent placement.   
   
 
OncTx History: 
12: MARAH/BSO/Cytoreductive surgery on 12 noted carcinomatosis/omental caking. 10/2012-2013: 6 cycles Carbo/Taxol 2014-2014: 6 Cycles carbo/Taxol 3/2015-5/13/15: 3 cycles Avastin/Doxil until progression 2015-2015 Weekly Topotecan  
2015-3/2016: Weekly Topotecan 2017-2017Mary Never D1/D8 Q28 days for 6 cycles 2018: CT core needle bx chest wall mass: Metastatic high-grade serous carcinoma (see comment) Positive for malignancy. 2018-2018: Taxol weekly 2019: 10 fx 30cGy palliative RT to sternal lesion 2019 - 2020 [place]. Underwent right ureteral stent placement on 3/18/19. 
2020: CT CAP variable response to therapy with several lesions decreased in size. However a majority of the lesions have increased in size consistent with progression of disease with involvement of the mediastinum, liver masses, and peritoneal carcinomatosis. Increase right-sided hydronephrosis. Increasing . 8/2020 - initiated Taxol weekly 10/2020 - RLE DVT on eliquis CT Results (most recent): 
Results from [facility] Encounter encounter on 06/24/20 CT ABD PELV W CONT Narrative EXAM: CT CHEST W CONT, CT ABD PELV W CONT INDICATION: Ovarian cancer COMPARISON: 10/2/2019 CONTRAST: 100 mL of Isovue-370. TECHNIQUE:  
Following the uneventful intravenous administration of contrast, thin axial 
images were obtained through the chest, abdomen and pelvis. Coronal and sagittal 
reconstructions were generated. Oral contrast was administered. CT dose 
reduction was achieved through use of a standardized protocol tailored for this 
examination and automatic exposure control for dose modulation. FINDINGS:  
 
THYROID: No nodule. MEDIASTINUM: Left paratracheal adenopathy now measures 3.7 x 2.0 cm increased in 
size. Mass in AP window which extends superiorly into the left infraclavicular 
region and inferiorly into the left hilum is also increase in size. When 
measured in a similar fashion this measures 3.5 x 2.4 previously 3.0 x 2.9 cm. Right parasternal mass is decreased in size measuring 3.4 x 3.4 cm. Subcarinal 
adenopathy measures 2.8 x 2.3 cm increased in size. There is increased size of 
the nodular component adjacent to the xiphoid now measuring 2.6 x 3.3 cm. SWATI: Left hilar lymph node measures 3.2 x 2.4 cm previously 2.5 x 1.8 cm. THORACIC AORTA: No dissection or aneurysm. MAIN PULMONARY ARTERY: Normal in caliber. TRACHEA/BRONCHI: Patent. ESOPHAGUS: No wall thickening or dilatation. HEART: Normal in size. PLEURA: No effusion or pneumothorax. LUNGS: No nodule, mass, or airspace disease. LIVER: Multiple masses are noted and better visualized on current study 
secondary to the addition of intravenous contrast. Comparison lesions measure 
previously, there is a 3.5 x 2.8 cm mass in the right hepatic lobe series 3 
image 58 slightly increased in size. There is a 3.6 x 2.9 cm mass in the left hepatic lobe slightly decreased in size series 3 image 56. Left hepatic lobe 
mass series 3 image 45 measuring 4.0 x 2.8 cm slightly increased in size. There 
is extensive tumor infiltration in the liver hilum with narrowing of the main 
portal vein as well as the intrahepatic portal veins. The common hepatic artery 
is not well visualized and likely occluded with some collateral flow noted in 
the liver hilum. GALLBLADDER: Not visualized SPLEEN: Mass in the splenic hilum increased in size measuring 7.4 x 4.3 cm with 
invasion into the splenic hilum. PANCREAS: The pancreas is encased by tumor. ADRENALS: There is likely involvement of the left adrenal gland tumor. KIDNEYS: Right nephroureteral stent with mild to moderate right-sided 
hydronephrosis. STOMACH: Tumor encasing the gastric antrum. SMALL BOWEL: No dilatation or wall thickening. COLON: Tumor abutting the tumor encasing the rectosigmoid junction. Tumor 
abutting the transverse colon. APPENDIX: Unremarkable. PERITONEUM: Some free fluid noted in the pelvis. Increased size of mesenteric 
masses. Representative examples: 7.8 x 4.3 cm tumor anterior peritoneum series 3 
image 84. No pelvic tumor 7.5 x 4.8 cm series 3 image 100. RETROPERITONEUM:  
Right pelvic sidewall increased in size. Large right lateral lymph node 
measuring 3.1 x 2.0 cm not significantly changed. REPRODUCTIVE ORGANS: The visualized URINARY BLADDER: No mass or calculus. BONES: No destructive bone lesion. ADDITIONAL COMMENTS: N/A Impression IMPRESSION: 
1. There is some variable response to therapy with several lesions decreased in 
size. However a majority of the lesions have increased in size consistent with 
progression of disease with involvement of the mediastinum, liver masses, and 
peritoneal carcinomatosis. 2.  Increase right-sided hydronephrosis. [facility] CT 10/2019: 
FINDINGS: 
THYROID: Visualized gland is unremarkable. MEDIASTINUM: Upper left paratracheal node measures 2.2 x 3.4 cm (2, 8), 
previously 2.3 x 3.5 cm. Superior prevascular node measures 2.9 x 3.0 cm, 
previously 3.5 x 3.8 cm. Poorly delineated subcarinal node measures about 3.4 x 
4.8 cm, previously 3.6 x 4.8 cm. Precardiac nodes measure 1.9 x 2.5 cm and 1.7 x 
2.4 cm (2, 39), previously 2.1 x 2.7 cm and 2.0 x 2.0 cm. SWATI: No mass or lymphadenopathy. THORACIC AORTA: No dissection or aneurysm. MAIN PULMONARY ARTERY: Normal in caliber. TRACHEA/BRONCHI: Patent. ESOPHAGUS: No wall thickening or dilatation. HEART: Normal in size. PLEURA: No effusion or pneumothorax. LUNGS: No nodule, mass, or airspace disease. LIVER: Multiple hypodense lesions suspicious for metastases, measuring up to 2.5 
x 4.0 cm in segment 2 (2, 43) series, previously 2.8 x 3.7 cm, 3.2 x 4.6 cm in 
segment 3 (2, 55), previously 3.1 x 4.5 cm, and partially exophytic from the 
posterior inferior right lobe segment 6 measuring 2.5 x 3.5 cm, previously 2.8 x 
3.4 cm. GALLBLADDER: Unremarkable. SPLEEN: Central 1.9 x 3.1 cm hypodensity (2, 47), previously 1.9 x 2.3 cm. Otherwise unremarkable. PANCREAS: Inseparable from bulky central abdominal adenopathy with no identified 
ductal dilation. ADRENALS: Unremarkable. KIDNEYS: Right hydronephrosis has improved status post right nephroureteral 
stent placement. Kidneys otherwise appear unremarkable. STOMACH: Unremarkable. SMALL BOWEL: No dilatation or wall thickening. COLON: No dilation or wall thickening. APPENDIX: Unremarkable. PERITONEUM: Extensive peritoneal implants with representative largest lesions 
measuring 4.4 x 6.1 cm and the left adnexal region (2, 98), previously 4.3 x 5.7 
cm, 3.8 x 5.9 cm in the anterior midline (2, 81), previously 3.4 x 5.4 cm, and 
2.8 x 4.7 cm in the right abdomen (2, 78), previously 3.2 x 4.7 cm. RETROPERITONEUM: Extensive rocio hepatis, retroperitoneal, and pelvic sidewall adenopathy. Conglomerate nkechi mass in the rocio hepatis region measures 5.7 x 
11.1 cm (2, 58) is, previously 6.3 x 12.2 cm. Aortocaval node measures 3.7 x 5.3 
cm (2, 77), previously 4.5 x 5.4 cm. Right pelvic sidewall node measures 2.9 x 
4.2 cm (2, 103), previously 3.4 x 4.2 cm. Left external iliac node measures 3.0 
x 4.8 cm (2, 101), previously 3.3 x 4.8 cm. REPRODUCTIVE ORGANS: Uterus and ovaries are surgically absent. URINARY BLADDER: No mass or calculus. Distal end of right nephroureteral stent 
positioned within bladder lumen. BONES: Degenerative spine change. No acute fracture or aggressive lesion. ADDITIONAL COMMENTS: Mass centered at the right sternocostal junction measures 5.3 x 5.4 cm (2, 21), previously 4.8 x 5.7 cm. Post surgical changes in the 
anterior abdominal wall. Left Port-A-Cath in place. Bilateral inguinal 
adenopathy measuring 2.1 x 3.2 cm on the right (2, 115), previously 2.3 x 3.7 cm 
and 2.7 x 2.8 cm on the left (2, 108), previously 2.9 x 3.1 cm. . 
  
IMPRESSION: Overall response to disease with either decrease in size or no 
significant change in extensive nkechi metastases, hepatic metastases, right 
sternal metastasis, and peritoneal carcinomatosis as above. No new metastasis or 
progressive metastasis identified. Status post right nephroureteral stent with 
interval resolution of right hydronephrosis.  
 
 
SUBJECTIVE 1/13/2021: 
 Presents to [facility], feeling in her normal state. Remains active at home, SOB with extended activity, but is exercising on her stationary bike and light workouts with her son. No recent dysuria. Hematuria has not occurred in over a week. She had cystoscopy with stent xchange in October with \"manipulation. \"  She has a visit with her urologist next week. She remains on eliquis for persistent RLE DVT. Swelling/discomfort has subsided. She denies any abd/chest/back pain currently. She ambulates at home with rare rollator or cane use if acute right knee pain d/t extensive OA. Goes up/down stairs slowly and unassisted currently. No weakness/light headedness. No falls. No N/V. Appetite stable. She drinks 4 bottles water/day with gatorade. +BM daily to every other day with miralax. Takes tramadol nightly for generalized discomforts. Two adult sons at home who cook for her. She is also fosters a 15 y/o boy, previously two boys. She is contemplating adopting him. Confirms if she passes, her son Satish will adopt him. She has discussed this with the adoption agency. She has chronic pedal/hand numbness/tingling due to taxol and DM. She wakes from sleep occasionally d/t this but resumes sleep easily. Follows with podiatrist. Denies any change in depression, continues medication. ROS 10 pt negative except as per above PMH: 
Past Medical History:  
Diagnosis Date  Adverse effect of anesthesia   
 sleep apnea cpap machine useage  Anemia  Arthritis DDD low back and knees  Asthma   
 uses albuterol prn only; none x 6 mos. Never hospitalized  BRCA negative 1/2013  Calculus of kidney  Chronic kidney disease   
 kidney stones  Chronic pain   
 knee pain bilat  CINV (chemotherapy-induced nausea and vomiting) 8/21/2014  Decreased hearing, right PATIENT STATES THIS IS DUE TO CHEMOTHERAPY  Diabetes ([facility] [address] Age 46 IDDM  Environmental allergies  Fibromyalgia  GERD (gastroesophageal reflux disease)   
 controlled with med  Hx of pulmonary embolus during pregnancy 2015  Hydronephrosis due to obstruction of ureter 3/18/2019  Hypertension  Ill-defined condition Sepsis  -  SOURCE PORT  
 Ill-defined condition 2016  
 chemotherapy  Mass of chest wall 2018  Morbid obesity ([facility] [address]  Murmur, heart  Other and unspecified hyperlipidemia  Ovarian cancer ([facility] [address] 2012, 2014  
 serous, high grade, stage IIIc. s/p chemotx (has port)  Psychiatric disorder Depression/Anxiety  Rectal bleeding  Thromboembolus ([address]  POST PARTUM; resolved- PELVIS  Thyroid disease HYPOTHYROID  Unspecified sleep apnea CPAP, Dr. Lucas Colón PSH: 
Past Surgical History:  
Procedure Laterality Date Adkins  
  X2  
 HX HYSTERECTOMY  2012 ROULA/BSO, tumor debulking, omentectomy for ovarian cancer  HX ORTHOPAEDIC Right   
 ankle-FX, R  
 HX ORTHOPAEDIC Right   
 FX R ARM  
 HX UROLOGICAL Right  STENT FOR KIDNEY STONES  
 HX VASCULAR ACCESS  2015  
 right chest- port placed  HX VASCULAR ACCESS Left 2017 TETE CATH  
 SC BREAST SURGERY PROCEDURE UNLISTED Lymph node in left breast 2017 SOC: 
Social History Socioeconomic History  Marital status:  Spouse name: Not on file  Number of children: Not on file  Years of education: Not on file  Highest education level: Not on file Occupational History  Not on file Social Needs  Financial resource strain: Not on file  Food insecurity Worry: Not on file Inability: Not on file  Transportation needs Medical: Not on file Non-medical: Not on file Tobacco Use  Smoking status: Never Smoker  Smokeless tobacco: Never Used Substance and Sexual Activity  Alcohol use: Yes Comment: 1 drink per month  Drug use: No  
 Sexual activity: Yes Lifestyle  Physical activity Days per week: Not on file Minutes per session: Not on file  Stress: Not on file Relationships  Social connections Talks on phone: Not on file Gets together: Not on file Attends Denominational service: Not on file Active member of club or organization: Not on file Attends meetings of clubs or organizations: Not on file Relationship status: Not on file  Intimate partner violence Fear of current or ex partner: Not on file Emotionally abused: Not on file Physically abused: Not on file Forced sexual activity: Not on file Other Topics Concern  Not on file Social History Narrative Lives in [place] alone.  passed away in 5/16 of a heart attack. Has 2 sons. Disability. 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 Enjoys swimming and going to the gym. Family History Family History Problem Relation Age of Onset  Hypertension Mother Orta.[place] Arthritis-osteo Mother  Hypertension Father  Arthritis-osteo Father  Cancer Father PROSTATE  COPD Father  Hypertension Brother  Elevated Lipids Brother  Arthritis-osteo Brother  Hypertension Brother  Elevated Lipids Brother  Obesity Brother  Cancer Brother PROSTATE  Anesth Problems Son DELAYED AWAKENING  
 Diabetes Maternal Grandmother  Anesth Problems Paternal Grandmother PATIENT STATES GRANDMOTHER'S HEART STOPPED DURING SURGERY Medications: (reviewed) Current Outpatient Medications on File Prior to Encounter Medication Sig Dispense Refill  omega-3 acid ethyl esters (LOVAZA) 1 gram capsule TAKE 1 CAPSULE BY MOUTH TWICE A  Cap 2  
 rosuvastatin (CRESTOR) 10 mg tablet Take 10 mg by mouth nightly.  doxazosin (CARDURA) 1 mg tablet Take  by mouth daily.  doxazosin (CARDURA) 1 mg tablet Take 1 mg by mouth daily.  SULFAMETHOXAZOLE-TRIMETHOPRIM PO Take  by mouth two (2) times a day. 7 days  dexAMETHasone (DECADRON) 4 mg tablet Take 2 tablets with breakfast the day before chemo and for 2 days after chemo 36 Tab 2  
 lidocaine-prilocaine (EMLA) topical cream Apply small amount over port area and cover with band aid one hour before chemo 30 g 3  
 ondansetron (ZOFRAN ODT) 4 mg disintegrating tablet Take 1 Tab by mouth every eight (8) hours as needed for Nausea. Indications: nausea and vomiting caused by cancer drugs 30 Tab 2  predniSONE (DELTASONE) 50 mg tablet Take 1 tablet 13 hours prior to scan, take 1 tablet 7 hours prior to scan and take 1 tablet one hour prior to scan 3 Tab 0  
 fluticasone propionate (FLONASE) 50 mcg/actuation nasal spray 2 SPRAYS IN BOTH NOSTRILS DAILY 1 Bottle 0  
 cycloSPORINE (RESTASIS) 0.05 % dpet  albuterol (PROVENTIL VENTOLIN) 2.5 mg /3 mL (0.083 %) nebu Take 2.5 mg by inhalation.  dextroamphetamine-amphetamine (ADDERALL) 20 mg tablet TAKE 1 TABLET BY MOUTH EVERY DAY  0  
 rosuvastatin (CRESTOR) 10 mg tablet TAKE 1 TABLET BY MOUTH EVERY DAY  0  
 telmisartan (MICARDIS) 20 mg tablet TAKE 1 TABLET BY MOUTH EVERY DAY 90 Tab 3  
 atorvastatin (LIPITOR) 20 mg tablet  BD ULTRA-FINE SHORT PEN NEEDLE 31 gauge x 5/16\" ndle USE TO INJECT INSULIN 5 TIMES DAILY 200 Pen Needle 11  
 insulin aspart U-100 (NOVOLOG FLEXPEN U-100 INSULIN) 100 unit/mL (3 mL) inpn INJECT 20 UNITS BEFORE EACH MEAL + 4 UNITS FOR EVERY 50 MG/DL ABOVE 150 MG/DL.  UNITS PER DAY 90 Adjustable Dose Pre-filled Pen Syringe 3  
 LEVEMIR FLEXTOUCH U-100 INSULN 100 unit/mL (3 mL) inpn INJECT 30 UNITS IN AM AND 50 UNITS AT NIGHT. INCREASE AS DIRECTED TO  UNITS/DAY. 90 Adjustable Dose Pre-filled Pen Syringe 3  DEXILANT 60 mg CpDB capsule (delayed release) TAKE 1 CAPSULE BY MOUTH EVERY DAY 90 Cap 3  
 LINZESS 145 mcg cap capsule TAKE 1 CAPSULE BY MOUTH EVERY DAYY 30 Cap 0  
  levothyroxine (SYNTHROID) 150 mcg tablet TAKE 1 TABLET BY MOUTH EVERY DAY BEFORE BREAKFAST 90 Tab 0  
 EPINEPHrine (EPIPEN) 0.3 mg/0.3 mL injection INJECT 0.3 ML BY INTRAMUSCULAR ROUTE ONCE AS NEEDED FOR UP TO 1 DOSE.  1  
 glucose blood VI test strips (TRUE METRIX GLUCOSE TEST STRIP) strip by Does Not Apply route See Admin Instructions. 30 Strip 5  
 lancets misc by Does Not Apply route daily. True Metrix lancets- test blood sugar once per day 30 Each 5  Blood-Glucose Meter monitoring kit Check up to tid, as directed 1 Kit 0  
 azelastine (ASTELIN) 137 mcg (0.1 %) nasal spray 1 [place] by Both Nostrils route daily as needed. Use in each nostril as directed  insulin detemir U-100 (LEVEMIR) 100 unit/mL injection 20 Units by SubCUTAneous route daily (with lunch).  montelukast (SINGULAIR) 10 mg tablet Take 10 mg by mouth nightly.  ascorbic acid, vitamin C, (VITAMIN C) 1,000 mg tablet Take 1,000 mg by mouth daily.  albuterol (PROVENTIL VENTOLIN) 2.5 mg /3 mL (0.083 %) nebulizer solution 2.5 mg by Nebulization route every four (4) hours as needed for Wheezing.  Liraglutide (VICTOZA) 0.6 mg/0.1 mL (18 mg/3 mL) pnij 1.8 mg by SubCUTAneous route daily (with lunch). 3 Pen 3  
 SYMBICORT 160-4.5 mcg/actuation HFAA Take 2 Puffs by inhalation two (2) times daily as needed. 2 Inhaler 3  clonazePAM (KLONOPIN) 0.5 mg tablet Take 0.5 mg by mouth nightly as needed.  sertraline (ZOLOFT) 50 mg tablet TAKE 1 TABLET BY MOUTH as needed  1  cholecalciferol, VITAMIN D3, (VITAMIN D3) 5,000 unit tab tablet Take 5,000 Units by mouth daily.  furosemide (LASIX) 40 mg tablet Take 40 mg by mouth every other day. 3 No current facility-administered medications on file prior to encounter. Allergies: (reviewed) Allergies Allergen Reactions  Carboplatin Other (comments) Patient developed shortness of breath, felt faint, coughing, skin flushed and \"itchy\". This occurred on the patients 8th treatment.  Iodinated Contrast Media Rash 13 hr pre-medication prior to IV contrast.  
Patient has done well with 1 hr pre-medication of (Solu-Medrol) 40mg &  (Benadryl) 50mg.  Lipitor [Atorvastatin] Myalgia  Shellfish Containing Products Hives  Tape [Adhesive] Itching and Other (comments) \"op site-- clear,thin tape\"--caused blister  Tomato Hives  Iodine And Iodide Containing Products Rash  Olmesartan Other (comments)  Losartan Other (comments)  
  headaches  Percocet [Oxycodone-Acetaminophen] Other (comments) Fever; however, current home med OBJECTIVE Physical Exam 
Visit Vitals BP (!) 146/78 Pulse 78 Temp 97.1 °F (36.2 °C) Resp 18 Ht 5' 8\" (1.727 m) Wt 273 lb (123.8 kg) LMP 09/07/2011 Breastfeeding No  
BMI 41.51 kg/m² GENERAL BRIAN: Conversant, pleasant, alert, oriented. NAD HEENT: Oropharynx clear. Sclera anicteric. No JVD. No adenopathy RESPIRATORY: unlabored GASTROINT: soft, non-tender, without masses or organomegaly, no fluid wave. Habitus limits exam.  
EXTREMITIES: bilat 1+ edema/girth nonpitting < RLE. Lightly tender R ankle w/o erythema. Foot warm, mobile, sensate. ALCON SURVEY: Negative NEURO: Grossly intact, no acute deficit. Oriented. Not depressed. Not agitated. Wt Readings from Last 3 Encounters:  
01/13/21 273 lb (123.8 kg) 01/06/21 271 lb (122.9 kg) 12/30/20 270 lb (122.5 kg) DATE REVIEW as available: 
Lab Results Component Value Date/Time WBC 4.1 01/13/2021 08:12 AM  
 Hemoglobin (POC) 10.5 (L) 03/20/2017 12:15 PM  
 HGB 6.4 (L) 01/13/2021 08:12 AM  
 Hematocrit (POC) 31 (L) 03/20/2017 12:15 PM  
 HCT 20.7 (L) 01/13/2021 08:12 AM  
 PLATELET 660 29/72/3785 08:12 AM  
 MCV 93.7 01/13/2021 08:12 AM  
 
Lab Results Component Value Date/Time ABS. NEUTROPHILS 2.3 01/13/2021 08:12 AM  
 
Lab Results Component Value Date/Time Sodium 141 01/13/2021 08:12 AM  
 Potassium 4.0 01/13/2021 08:12 AM  
 Chloride 111 (H) 01/13/2021 08:12 AM  
 CO2 26 01/13/2021 08:12 AM  
 Anion gap 4 (L) 01/13/2021 08:12 AM  
 Glucose 140 (H) 01/13/2021 08:12 AM  
 BUN 32 (H) 01/13/2021 08:12 AM  
 Creatinine 1.24 (H) 01/13/2021 08:12 AM  
 BUN/Creatinine ratio 26 (H) 01/13/2021 08:12 AM  
 GFR est AA 54 (L) 01/13/2021 08:12 AM  
 GFR est non-AA 44 (L) 01/13/2021 08:12 AM  
 Calcium 8.7 01/13/2021 08:12 AM  
 Bilirubin, total 0.6 12/29/2020 05:02 PM  
 Alk. phosphatase 60 12/29/2020 05:02 PM  
 Protein, total 7.1 12/29/2020 05:02 PM  
 Albumin 2.9 (L) 12/29/2020 05:02 PM  
 Globulin 4.2 (H) 12/29/2020 05:02 PM  
 A-G Ratio 0.7 (L) 12/29/2020 05:02 PM  
 ALT (SGPT) 30 12/29/2020 05:02 PM  
 
 
CA-125 Date Value Ref Range Status 12/29/2020 46 (H) 1.5 - 35.0 U/mL Final  
  Comment:  
  ** Note new reference range and method ** Results may vary depending on . Method used is HiperScan 
  
12/03/2020 48 (H) 1.5 - 35.0 U/mL Final  
  Comment:  
  ** Note new reference range and method ** Results may vary depending on . Method used is HiperScan 
  
11/05/2020 52 (H) 1.5 - 35.0 U/mL Final  
  Comment:  
  ** Note new reference range and method ** Results may vary depending on . Method used is HiperScan 10/08/2020 116 (H) 1.5 - 35.0 U/mL Final  
  Comment:  
  ** Note new reference range and method ** Results may vary depending on . Method used is HiperScan 
  
09/03/2020 187 (H) 1.5 - 35.0 U/mL Final  
  Comment:  
  ** Note new reference range and method ** Results may vary depending on . Method used is HiperScan ECHO 1/2020 · Normal cavity size and systolic function (ejection fraction normal). Mild concentric hypertrophy. Estimated left ventricular ejection fraction is 55 - 60%. Visually measured ejection fraction. No regional wall motion abnormality noted. · Mildly dilated left atrium. · Mild aortic valve sclerosis. · Trace mitral valve regurgitation is present. IMPRESSION AND PLAN: 
62 y.o. with diffusely metastatic ovarian cancer. Continues on palliative chemo, retrial taxol Labs/chart reviewed C6 Taxol, ongoing. Responsive chemically. EOD CT to follow. CKD stage 2-3 - stable. Continue home hydration. Right hydronephrosis - stent in place. Exchanged October 2020. Recent UTI w/o recurrence. Hematuria currently resolved. She is on [facility]. Liver mets - asx Pain d/t disease/neuropathy -tramadol RLE DVT acute/chronic - stable on eliquis. Anemia - stable, minimal sx. No CVD, signs of End organ dysfunction. Transfuse for overt sx. SOB/debility - Improved. Hx Asthma, chronic due to condition, anemia. IDDM2/depression - controlled overall. CIPN/diabetic polyneuropathy - not in pain, monitor. Discussed home safety. Wt loss, prot smiley malnutrition - supplements/alimentation as best as able. Psychosocial - she has some fear about the future, currently very pleased with progress. She is well supported, isaac remains very important. Monitor depressive sx mgmt. Discussed home life and plans for the future. Plans for adopting her foster child with support of her son Satish. 
 
01/13/21 I have spent 20 minutes reviewing previous notes/chart, test results and face to face with the patient discussing the diagnosis and importance of compliance with the treatment plan. Electronically signed Yaron Munroe PA-C 
1/13/2021

## 2021-01-13 NOTE — PROGRESS NOTES
[facility] Progress [facility] Date: 2021 Name: Cathryn Fried MRN: [id number] : 1960 
 
1100 Ms. Giovana Neil Arrived to [place] for  Taxol (F2M05) ambulatory in stable condition. Assessment was completed, no acute issues at this time, no new complaints voiced. Port  accessed with positive blood return. Labs drawn and sent for processing. Port flushed, curos cap applied to end clave. Patient does not need blood today Labs within parameters for treatment. All medications ordered from pharmacy. Port connected to [facility]. Patient denies SOB, fever, cough, general not feeling well. Patient denies recent exposure to someone who has tested positive for COVID-19. Patient denies having contact with anyone who has a pending COVID test.   
 
Chemotherapy Flowsheet 2021 Cycle [id number] Date 2021 Drug / Regimen Taxol Dosage -  
Pre Hydration -  
Pre Meds given Notes given Ms. Moore's vitals were reviewed. Patient Vitals for the past 12 hrs: 
 Temp Pulse Resp BP  
21 0803 97.1 °F (36.2 °C) 78 18 (!) 146/78 Pre-medications  were administered as ordered and chemotherapy was initiated. Medications Administered 0.9% sodium chloride infusion Admin Date 
2021 Action New Bag Dose 25 mL/hr Rate 25 mL/hr Route IntraVENous Administered By 
Keiko Jeffries RN  
  
  
 dexamethasone (DECADRON) 12 mg in 0.9% sodium chloride 50 mL, overfill volume 5 mL IVPB Admin Date 
2021 Action Given Dose 
12 mg Rate 232 mL/hr Route IntraVENous Administered By 
Keiko Jeffries RN  
  
  
 diphenhydrAMINE (BENADRYL) injection 50 mg   
 Admin Date 
2021 Action Given Dose 50 mg Route IntraVENous Administered By 
Keiko Jeffries RN  
  
  
 famotidine (PF) (PEPCID) 20 mg in 0.9% sodium chloride 10 mL injection Admin Date 
2021 Action Given Dose 
20 mg Route IntraVENous Administered By 
Keiko Jeffries RN  
  
  
 heparin (porcine) pf 300-500 Units Admin Date 
01/13/2021 Action Given Dose 
500 Units Route InterCATHeter Administered By 
Chela Pelaez RN  
  
  
 PACLitaxeL (TAXOL) 197 mg in 0.9% sodium chloride 250 mL, overfill volume 25 mL chemo infusion Admin Date 
01/13/2021 Action New Bag Dose 
197 mg Rate 
307.8 mL/hr Route IntraVENous Administered By 
Chela Pelaez RN  
  
  
 palonosetron HCl (ALOXI) injection 0.25 mg   
 Admin Date 
01/13/2021 Action Given Dose 0.25 mg Route IntraVENous Administered By 
Chela Pelaez RN  
  
  
 saline peripheral flush soln 10 mL Admin Date 
01/13/2021 Action Given Dose 
10 mL Route InterCATHeter Administered By 
Chela Pelaez RN  
  
  
  
 
 
 
[id number] Patient tolerated treatment well. Port maintained positive blood return throughout treatment. Port flushed, heparinized and de accessed per protocol. Patient was discharged from [address] [place] [address] in no distress. Patient is aware of next appointment on Future Appointments Date Time Provider Gabriel Phillip 1/20/2021 10:00 AM [facility] ER 1 [facility]  
1/26/2021 11:30 AM MD LUCIEN Valencia  
1/27/2021  8:00 AM [facility] [address] [facility]  
2/3/2021  8:00 AM [facility] [address] [facility]  
2/10/2021  8:00 AM [facility] [address]  
2/16/2021 10:45 AM MD LUCIEN Valencia BS CHIKI Danielson RN 
January 13, 2021

## 2021-01-19 NOTE — TELEPHONE ENCOUNTER
Pt calling in for there premedication for her CT scan tomorrow, will send over prednisone to her pharmacy    Requested Prescriptions     Signed Prescriptions Disp Refills    predniSONE (DELTASONE) 50 mg tablet 3 Tab 0     Sig: Take 1 tablet 13 hours prior to scan, take 1 tablet 7 hours prior to scan and take 1 tablet one hour prior to scan     Authorizing Provider: Param Montes     Ordering User: Roberto Evans

## 2021-01-25 NOTE — PROGRESS NOTES
CT scan results from 1/20/2021, video visit 1. Have you been to the ER, urgent care clinic since your last visit? Hospitalized since your last visit?  no 
 
2. Have you seen or consulted any other health care providers outside of the [address] [place] [address] since your last visit? Include any pap smears or colon screening.    no

## 2021-01-26 NOTE — PROGRESS NOTES
27 [facility], [address] 
P [phone]  F [phone] Office Visit Patient ID: 
Name: Antoinette Hernandez MRN: [id number] : 1960/60 y.o. Visit date: 2021 LEONID Lerma is a 62 y.o.  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 The patient's previous treatment procedures include primary Taxol/carbo with retreatment in  and Doxil/Avastin, topotecan, gemzar and now single agent weekly Taxol initiated 18 following progression with chest wall involvement. In 2018, patient did not follow up and self-discontinued her treatment regimen. She presented to the ER on 18 with SOB. Negative workup for PE or MI. Admitted to Hospitalist service from -18. Managed for acute asthma exacerbation, acute FELICIA, and hyperkalemia. The patient was initiated on Waynetta Kirkland on 19. Underwent repeat CT C/A/P on 19 for purposes of following response to treatment. Patient was tolerating Waynetta Kirkland well for the first month, but then had a rise in her creatinine. However, she was also found to have hydronephrosis. Underwent right ureteral stent placement on 3/18/19. Restarted Zejula afterwards, although with continued rise in creatinine to >2. Held Waynetta Kirkland again and restarted Zejula at 100mg/daily on 19. Completed 2700 cGy of radiation to her chest wall mass on 19. Held Waynetta Kirkland one week in 10/2019 for blood transfusion secondary to anemia. Normal iron and B12 labs. CT C/A/P on 10/2/19 with improvement in her disease. CT C/A/P 2020 demonstrates progression. Rising Ca-125. On 2020 initiated on weekly paclitaxel. Presents today for continuation of treatment, cycle 7. CT A/P 1/20/2021 with significant improvement in her disease. She is takign iron supplementation and reports feeling well. She is on Eliquis for a right lower extremity DVT. She has tolerated treatment well thus far, and denies complaints except for pain and swelling in her right leg. She denies pain in her chest wall or abdomen. She does have some constipation on the day of treatment, which is relieved with Miralax. Ureteral stent exchanged at the end of October. Scheduled to see Urology in February. She has not required any antibiotics recently. Denies change in appetite or bowel habits. Denies vaginal bleeding, hematuria, hematochezia, constipation, diarrhea, nausea, vomiting, CP, SOB, fevers, or chills. 
   
OncTx History: 
9/21/12: MARAH/BSO/Cytoreductive surgery on 9/21/12 noted carcinomatosis/omental caking. 10/2012-2/2013: 6 cycles Carbo/Taxol 8/2014-11/2014: 6 Cycles carbo/Taxol 3/2015-5/13/15: 3 cycles Avastin/Doxil until progression 7/2015-8/2015 Weekly Topotecan  
12/2015-3/2016: Weekly Topotecan 2/2017-9/2017Mary Never D1/D8 Q28 days for 6 cycles 4/2018: CT core needle bx chest wall mass: Metastatic high-grade serous carcinoma (see comment) General Categorization Positive for malignancy. 5/8/2018-Present: Taxol  S5,4,13; S/P  Cycle #4  8/14/2018. Patient discontinued treatment secondary to side effects and fatigue. 1/23/2019 - Initiated Monroe Walsh. Held do to rising creatinine. However, she was also found to have hydronephrosis. Underwent right ureteral stent placement on 3/18/19. CT C/A/P 6/24/2020 with mixed response, but overall progression. Rising Ca-125 now 268. 
8/6/2020: Initiated weekly paclitaxel 80mg/m2 days , 8, and 15 of 28-day cycle. Imaging Review:  
CT C/A/P 1/20/2021: 
FINDINGS: 
Chest: 
LUNGS: Medial right middle lobe and minimal medial left upper lobe opacities favor scarring. Minimal left basilar subsegmental atelectasis versus scarring. No other significant pulmonary abnormality. No suspicious nodule or mass. The 
central airways are patent. LYMPH NODES: Decrease in size of bulky prevascular anterior mediastinal and 
middle mediastinal lymphadenopathy. Prevascular lymph node measures 5.0 x 1.6 cm 
(series 3, image 21), compared to 6.0 x 2.5 cm previously. Middle mediastinal 
lymph node measures 1.7 x 1.67 m (image 28), compared to 2.4 x 2.3 cm. Anterior 
cardiophrenic lymphadenopathy measures smaller measuring 3.7 cm (image 41), 
compared to 4.4 cm. PLEURAL FLUID: No pleural effusion. PERICARDIAL FLUID: No pericardial effusion. THYROID: No thyroid nodule. OTHER: Left chest port extends to the proximal right atrium. Abdomen: LIVER: Decreased bulky left hepatic low-density lesions. A representative lesion 
measures 4.2 x 1.8 cm (image 51), compared to 5.8 x 3.8 cm. A few low-density 
lesions in the right hepatic lobe are relatively stable with a representative 
lesion measuring 3.5 cm (image 60). No biliary ductal dilatation. GALLBLADDER: Decompressed without calcified stone. SPLEEN: Decreased infiltrative mass at the hilum measured 3.4 cm (image 52), 
compared to 5.8 cm previously. PANCREAS: No mass or ductal dilatation. ADRENALS: Unremarkable. KIDNEYS/URETERS: Symmetric nephrograms with mild bilateral renal cortical 
atrophy and scarring. Stable right ureteral stent with relatively unchanged mild 
to moderate right hydronephrosis. Slight increased mild left hydronephrosis of 
uncertain significance. PERITONEUM: Central mesenteric mass lesions are markedly smaller. A 
representative lesion measures 7.0 x 3.9 cm (image 66), compared to 9.9 x 5.6 
cm. Decrease in size in left peritoneal mass measuring 6.5 cm (image 57), 
compared to 7.4 cm. Retroperitoneal masses smaller with a representative lesion measuring 4.7 x 3.5 cm (image 79), compared to 6.5 x 4.0 cm. No significant 
ascites. COLON: No dilatation or wall thickening. APPENDIX: Not visualized SMALL BOWEL: No dilatation or wall thickening. STOMACH: Unremarkable. Pelvis: PELVIS: Decrease in pelvic lymphadenopathy and size of pelvic masses. A left 
external iliac chain lymph node measures 17 mm in short axis (image 107), 
compared to 32 mm previously. A right lower pelvic mass measures 5.3 cm in 
greatest length (image 109), compared to 7.0 cm. No pelvic free fluid. BONES: Stable appearance of the bones with sclerotic lesion in the sternum ADDITIONAL COMMENTS: N/A IMPRESSION Response to therapy with significant decrease in size of thoracic 
lymphadenopathy, liver metastases, abdominal lymphadenopathy, and pelvic 
lymphadenopathy. Slight increased mild left hydronephrosis of uncertain significance. ROS Constitutional: no weight loss, fever, night sweats Respiratory: no cough, shortness of breath, or wheezing Cardiovascular: no chest pain or dyspnea on exertion. Reports chest wall mass smaller, no associated pain. Heme: No abnormal bleeding Gastrointestinal: no abdominal pain, change in bowel habits, or black or bloody stools Genito-Urinary: no dysuria, trouble voiding, or hematuria Musculoskeletal: + swelling in ankle - bilateral, mostly only at end of day Neurological: mild neuropathy Derm: pigmentation/induration medial left leg. Prior injury from fall. Psych: positive for depression - controlled PMH: 
Past Medical History:  
Diagnosis Date  Adverse effect of anesthesia   
 sleep apnea cpap machine useage  Anemia  Arthritis DDD low back and knees  Asthma   
 uses albuterol prn only; none x 6 mos. Never hospitalized  BRCA negative 1/2013  Calculus of kidney  Chronic kidney disease   
 kidney stones  Chronic pain   
 knee pain bilat  CINV (chemotherapy-induced nausea and vomiting) 2014  Decreased hearing, right PATIENT STATES THIS IS DUE TO CHEMOTHERAPY  Diabetes ([facility] [address] Age 46 IDDM  Environmental allergies  Fibromyalgia  GERD (gastroesophageal reflux disease)   
 controlled with med  Hx of pulmonary embolus during pregnancy 2015  Hydronephrosis due to obstruction of ureter 3/18/2019  Hypertension  Ill-defined condition Sepsis  -  SOURCE PORT  
 Ill-defined condition 2016  
 chemotherapy  Mass of chest wall 2018  Morbid obesity ([address]  Murmur, heart  Other and unspecified hyperlipidemia  Ovarian cancer ([address] 2012, 2014  
 serous, high grade, stage IIIc. s/p chemotx (has port)  Psychiatric disorder Depression/Anxiety  Rectal bleeding  Thromboembolus ([address]  POST PARTUM; resolved- PELVIS  Thyroid disease HYPOTHYROID  Unspecified sleep apnea CPAP, Dr. Terrance Young PSH: 
Past Surgical History:  
Procedure Laterality Date [place]  
  X2  
 HX HYSTERECTOMY  2012 ROULA/BSO, tumor debulking, omentectomy for ovarian cancer  HX ORTHOPAEDIC Right   
 ankle-FX, R  
 HX ORTHOPAEDIC Right   
 FX R ARM  
 HX UROLOGICAL Right  STENT FOR KIDNEY STONES  
 HX VASCULAR ACCESS  2015  
 right chest- port placed  HX VASCULAR ACCESS Left 2017 TETE CATH  
 [place] BREAST SURGERY PROCEDURE UNLISTED Lymph node in left breast 2017 SOC: 
Social History Socioeconomic History  Marital status:  Spouse name: Not on file  Number of children: Not on file  Years of education: Not on file  Highest education level: Not on file Occupational History  Not on file Social Needs  Financial resource strain: Not on file  Food insecurity Worry: Not on file Inability: Not on file  Transportation needs Medical: Not on file Non-medical: Not on file Tobacco Use  Smoking status: Never Smoker  Smokeless tobacco: Never Used Substance and Sexual Activity  Alcohol use: Yes Comment: 1 drink per month  Drug use: No  
 Sexual activity: Yes Lifestyle  Physical activity Days per week: Not on file Minutes per session: Not on file  Stress: Not on file Relationships  Social connections Talks on phone: Not on file Gets together: Not on file Attends [employer] service: Not on file Active member of club or organization: Not on file Attends meetings of clubs or organizations: Not on file Relationship status: Not on file  Intimate partner violence Fear of current or ex partner: Not on file Emotionally abused: Not on file Physically abused: Not on file Forced sexual activity: Not on file Other Topics Concern  Not on file Social History Narrative Lives in [place] alone.  passed away in 5/16 of a heart attack. Has 2 sons. Disability. 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 Enjoys swimming and going to the gym. Family History Family History Problem Relation Age of Onset  Hypertension Mother [facility] Arthritis-osteo Mother  Hypertension Father  Arthritis-osteo Father  Cancer Father PROSTATE  COPD Father  Hypertension Brother  Elevated Lipids Brother  Arthritis-osteo Brother  Hypertension Brother  Elevated Lipids Brother  Obesity Brother  Cancer Brother PROSTATE  Anesth Problems Son DELAYED AWAKENING  
 Diabetes Maternal Grandmother  Anesth Problems Paternal Grandmother PATIENT STATES GRANDMOTHER'S HEART STOPPED DURING SURGERY Medications: (reviewed) Current Outpatient Medications on File Prior to Visit Medication Sig Dispense Refill  predniSONE (DELTASONE) 50 mg tablet Take 1 tablet 13 hours prior to scan, take 1 tablet 7 hours prior to scan and take 1 tablet one hour prior to scan 3 Tab 0  
 omega-3 acid ethyl esters (LOVAZA) 1 gram capsule TAKE 1 CAPSULE BY MOUTH TWICE A  Cap 2  
 rosuvastatin (CRESTOR) 10 mg tablet Take 10 mg by mouth nightly.  doxazosin (CARDURA) 1 mg tablet Take  by mouth daily.  doxazosin (CARDURA) 1 mg tablet Take 1 mg by mouth daily.  SULFAMETHOXAZOLE-TRIMETHOPRIM PO Take  by mouth two (2) times a day. 7 days  dexAMETHasone (DECADRON) 4 mg tablet Take 2 tablets with breakfast the day before chemo and for 2 days after chemo 36 Tab 2  
 lidocaine-prilocaine (EMLA) topical cream Apply small amount over port area and cover with band aid one hour before chemo 30 g 3  
 ondansetron (ZOFRAN ODT) 4 mg disintegrating tablet Take 1 Tab by mouth every eight (8) hours as needed for Nausea. Indications: nausea and vomiting caused by cancer drugs 30 Tab 2  
 fluticasone propionate (FLONASE) 50 mcg/actuation nasal spray 2 SPRAYS IN BOTH NOSTRILS DAILY 1 Bottle 0  
 cycloSPORINE (RESTASIS) 0.05 % dpet  albuterol (PROVENTIL VENTOLIN) 2.5 mg /3 mL (0.083 %) nebu Take 2.5 mg by inhalation.  dextroamphetamine-amphetamine (ADDERALL) 20 mg tablet TAKE 1 TABLET BY MOUTH EVERY DAY  0  
 rosuvastatin (CRESTOR) 10 mg tablet TAKE 1 TABLET BY MOUTH EVERY DAY  0  
 telmisartan (MICARDIS) 20 mg tablet TAKE 1 TABLET BY MOUTH EVERY DAY 90 Tab 3  
 atorvastatin (LIPITOR) 20 mg tablet  insulin aspart U-100 (NOVOLOG FLEXPEN U-100 INSULIN) 100 unit/mL (3 mL) inpn INJECT 20 UNITS BEFORE EACH MEAL + 4 UNITS FOR EVERY 50 MG/DL ABOVE 150 MG/DL.  UNITS PER DAY 90 Adjustable Dose Pre-filled Pen Syringe 3  
 LEVEMIR FLEXTOUCH U-100 INSULN 100 unit/mL (3 mL) inpn INJECT 30 UNITS IN AM AND 50 UNITS AT NIGHT. INCREASE AS DIRECTED TO  UNITS/DAY.  90 Adjustable Dose Pre-filled Pen Syringe 3  
  DEXILANT 60 mg CpDB capsule (delayed release) TAKE 1 CAPSULE BY MOUTH EVERY DAY 90 Cap 3  
 LINZESS 145 mcg cap capsule TAKE 1 CAPSULE BY MOUTH EVERY DAYY 30 Cap 0  
 levothyroxine (SYNTHROID) 150 mcg tablet TAKE 1 TABLET BY MOUTH EVERY DAY BEFORE BREAKFAST 90 Tab 0  
 EPINEPHrine (EPIPEN) 0.3 mg/0.3 mL injection INJECT 0.3 ML BY INTRAMUSCULAR ROUTE ONCE AS NEEDED FOR UP TO 1 DOSE.  1  
 Blood-Glucose Meter monitoring kit Check up to tid, as directed 1 Kit 0  
 azelastine (ASTELIN) 137 mcg (0.1 %) nasal spray 1 [place] by Both Nostrils route daily as needed. Use in each nostril as directed  insulin detemir U-100 (LEVEMIR) 100 unit/mL injection 20 Units by SubCUTAneous route daily (with lunch).  montelukast (SINGULAIR) 10 mg tablet Take 10 mg by mouth nightly.  ascorbic acid, vitamin C, (VITAMIN C) 1,000 mg tablet Take 1,000 mg by mouth daily.  albuterol (PROVENTIL VENTOLIN) 2.5 mg /3 mL (0.083 %) nebulizer solution 2.5 mg by Nebulization route every four (4) hours as needed for Wheezing.  Liraglutide (VICTOZA) 0.6 mg/0.1 mL (18 mg/3 mL) pnij 1.8 mg by SubCUTAneous route daily (with lunch). 3 Pen 3  
 SYMBICORT 160-4.5 mcg/actuation HFAA Take 2 Puffs by inhalation two (2) times daily as needed. 2 Inhaler 3  clonazePAM (KLONOPIN) 0.5 mg tablet Take 0.5 mg by mouth nightly as needed.  sertraline (ZOLOFT) 50 mg tablet TAKE 1 TABLET BY MOUTH as needed  1  cholecalciferol, VITAMIN D3, (VITAMIN D3) 5,000 unit tab tablet Take 5,000 Units by mouth daily.  furosemide (LASIX) 40 mg tablet Take 40 mg by mouth every other day. 3  
 BD ULTRA-FINE SHORT PEN NEEDLE 31 gauge x 5/16\" ndle USE TO INJECT INSULIN 5 TIMES DAILY 200 Pen Needle 11  
 glucose blood VI test strips (TRUE METRIX GLUCOSE TEST STRIP) strip by Does Not Apply route See Admin Instructions. 30 Strip 5  
 lancets misc by Does Not Apply route daily.  True Metrix lancets- test blood sugar once per day 30 Each 5  
 
 No current facility-administered medications on file prior to visit. Allergies: (reviewed) Allergies Allergen Reactions  Carboplatin Other (comments) Patient developed shortness of breath, felt faint, coughing, skin flushed and \"itchy\". This occurred on the patients 8th treatment.  Iodinated Contrast Media Rash 13 hr pre-medication prior to IV contrast.  
Patient has done well with 1 hr pre-medication of (Solu-Medrol) 40mg &  (Benadryl) 50mg.  Lipitor [Atorvastatin] Myalgia  Shellfish Containing Products Hives  Tape [Adhesive] Itching and Other (comments) \"op site-- clear,thin tape\"--caused blister  Tomato Hives  Iodine And Iodide Containing Products Rash  Olmesartan Other (comments)  Losartan Other (comments)  
  headaches  Percocet [Oxycodone-Acetaminophen] Other (comments) Fever; however, current home med OBJECTIVE *deferred today given video-conference visit for ongoing COVID-19 pandemic* Physical Exam 
Visit Vitals LMP 09/07/2011 Physical Exam: 
General: Alert and oriented. No acute distress. Well-nourished HEENT: No thyroid enlargment. Neck supple without restrictions. Sclera normal. Normal occular motion. Moist mucous membranes. Lymphatics: No evidence of axillary, cervical, or subclavicular adenopathy. Respiratory: clear to auscultation and percussion to the bases. No CVAT. Chest wall mass is still palpable along the superior sternum at the level of the superior aspect of the breasts, but improved. Normal overlying skin Cardiovascular: regular rate and rhythm. No murmurs, rubs, or gallops. Gastrointestinal: soft, non-tender, non-distended, no masses or organomegaly. Well-healed incision. Musculoskeletal: normal gait. No joint tenderness, deformity or swelling. No muscular tenderness. Extremities: extremities normal, atraumatic, mild 1+ edema bilaterally. Pelvic: deferred today Neuro: Grossly intact. Normal gait and movement. No acute deficit Skin: No evidence of rashes or skin changes. DATE REVIEW as available: 
Lab Results Component Value Date/Time WBC 4.1 01/13/2021 08:12 AM  
 Hemoglobin (POC) 10.5 (L) 03/20/2017 12:15 PM  
 HGB 6.4 (L) 01/13/2021 08:12 AM  
 Hematocrit (POC) 31 (L) 03/20/2017 12:15 PM  
 HCT 20.7 (L) 01/13/2021 08:12 AM  
 PLATELET 554 76/31/8354 08:12 AM  
 MCV 93.7 01/13/2021 08:12 AM  
 
Lab Results Component Value Date/Time  
 ABS. NEUTROPHILS 2.3 01/13/2021 08:12 AM  
 
Lab Results Component Value Date/Time Sodium 141 01/13/2021 08:12 AM  
 Potassium 4.0 01/13/2021 08:12 AM  
 Chloride 111 (H) 01/13/2021 08:12 AM  
 CO2 26 01/13/2021 08:12 AM  
 Anion gap 4 (L) 01/13/2021 08:12 AM  
 Glucose 140 (H) 01/13/2021 08:12 AM  
 BUN 32 (H) 01/13/2021 08:12 AM  
 Creatinine 1.24 (H) 01/13/2021 08:12 AM  
 BUN/Creatinine ratio 26 (H) 01/13/2021 08:12 AM  
 GFR est AA 54 (L) 01/13/2021 08:12 AM  
 GFR est non-AA 44 (L) 01/13/2021 08:12 AM  
 Calcium 8.7 01/13/2021 08:12 AM  
 Bilirubin, total 0.6 12/29/2020 05:02 PM  
 Alk. phosphatase 60 12/29/2020 05:02 PM  
 Protein, total 7.1 12/29/2020 05:02 PM  
 Albumin 2.9 (L) 12/29/2020 05:02 PM  
 Globulin 4.2 (H) 12/29/2020 05:02 PM  
 A-G Ratio 0.7 (L) 12/29/2020 05:02 PM  
 ALT (SGPT) 30 12/29/2020 05:02 PM  
 
Ca-125: 
6/11/2020: 268 
9/3/2020: 187 ECHO 7/17/18 Left ventricle: Systolic function was normal. Ejection fraction was estimated in the range of 55 % to 60 %. There were no regional wall motion 
abnormalities. Wall thickness was mildly increased. Left atrium: The atrium was mildly dilated. Mitral valve: There was mild regurgitation. Aortic valve: Leaflets exhibited sclerosis without stenosis. Not well visualized.  
 
 
IMPRESSION AND PLAN: 
 Ms. Purvi Parisi is a 62 y.o. female with recurrent, metastatic ovarian cancer. Heavily pre-treated. Lost to follow-up since 9/2018, at which time patient self-discontinued weekly paclitaxel. Initiated Pamela Sanchez on 1/23/19. Held after 1 month secondary to rising creatinine. Found to have right hydronephrosis. Right ureteral stent placed 3/18/19 with normalization of creatinine. Completed 2700 cGy to the sternun 7/11/19. Restarted Zejula at 100mg/daily on 4/23/19. Now with rising Ca-125 and progression on CT 6/24/2020. Initiated on weekly Taxol 80mg/m2 days 1, 8, and 15 in a 28-day cycle on 8/6/2020. Problem List Items Addressed This Visit Circulatory Acute deep vein thrombosis (DVT) of proximal vein of right lower extremity ([address] Hypertension Endocrine Controlled type 2 diabetes mellitus without complication, with long-term current use of insulin ([address] Ovarian cancer ([address] - Primary Immune Lymphadenopathy, mediastinal  
  
 Respiratory SHABANA on CPAP Other Carcinomatosis ([address] Mass of chest wall Reviewed patient's course to date. Presents today for consideration of cycle 7 weekly paclitaxel. Recent RLE DVT. Remains on Eliquis. Patient to remain on Eliquis indefinitely given her active ongoing treatment of her cancer. Tolerated cycle 1-6 well. Her Ca-125 is responding to treatment nicely. CT C/A/P 1/20/2021 with significant improvement in disease burden. Will follow-up pre-chemo labs. Plan to continue treatment for as long as Ca-125 continues to respond. Given that she is responding so nicely, I would like to continue treatment with paclitaxel for as long as possible. She is tolerating treatment well. May consider maintenance PARPi in the future with either olaparib or rucaparib, although the use of PARPi after prior PARPi is not well studied. She remains anemic, which is certainly related to her chemotherapy and her underlying medical comorbid conditions. Will plan to transfuse as needed if symptomatic. She is heavily pre-treated and her bone marrow reserve remains low. Continue iron supplementation. Urology is managing her stent and recurrent UTIs. Has not required antibiotics for some time. Stent exchanged on 10/30/2020 per patient. F/u in 2/2021. The patient has underlying diabetes and neuropathy, which is actually improved right now per the patient's report. I expressed my concerns that we will need to watch this closely while on weekly Taxol, as this may certainly worsen her diabetic neuropathy. All questions and concerns were addressed with the patient and she is comfortable with the plan. An electronic signature was used to authenticate this note. Neeraj Mendenhall MD 
 
Pursuant to the emergency declaration unde the [facility], [facility] waiver authority and the [employer] and [employer] Act, this Virtual Visit was conducted, with patient's consent, to reduce the patient's risk of exposure to COVID-19 and provide continuity of care for an established patient. Patient identification was verified at the start of the visit: Yes Services were provided through a video synchronous discussion virtually to substitute for in-person clinic visit. Patient was at her individual home, while the provider was in his/her respective office. I spent at least 40 minutes with this established patient, and >50% of the time was spent counseling and/or coordinating care regarding chemotherapy, ovarian cancer Neeraj Mendenhall MD

## 2021-01-27 NOTE — PROGRESS NOTES
[facility] Progress Note Date: 2021 Name: Levar Walker MRN: [id number] : 1960 Ms. Hong López Arrived ambulatory and in no distress for cycle 7 day 1 of Taxol regimen. Assessment was completed, no acute issues at this time. Patient reports that she continues to be short of breath on exertion and required increasing time to recover. Additionally she reports that she has some burning with urination and some blood in her urine. No other problems reports at this time. Port accessed without difficulty, labs drawn and processed. Patient waiting in her room for lab results. Prior to treatment, patent was screened for COVID 19. Patient denies any signs o symptoms of COVID. Denies any known physical contact with anyone exposed to, diagnosed with or with pending or positive COVID test. Denies any pending or positive COVID test herself. Chemotherapy Flowsheet 2021 Cycle C7D1 Date 2021 Drug / Regimen Taxol Dosage 197mg Time Up 1552 Time Down 3972 Pre Hydration none Post Hydration none Pre Meds given Notes given Ms. Moore's vitals were reviewed. Patient Vitals for the past 12 hrs: 
 Temp Pulse BP  
21 1655  72 (!) 145/82  
21 1310 97.2 °F (36.2 °C) 75 (!) 152/84 Lab results were obtained and reviewed. Recent Results (from the past 12 hour(s)) CBC WITH AUTOMATED DIFF Collection Time: 21  1:14 PM  
Result Value Ref Range WBC 6.2 3.6 - 11.0 K/uL  
 RBC 2.26 (L) 3.80 - 5.20 M/uL HGB 6.6 (L) 11.5 - 16.0 g/dL HCT 21.7 (L) 35.0 - 47.0 % MCV 96.0 80.0 - 99.0 FL  
 MCH 29.2 26.0 - 34.0 PG  
 MCHC 30.4 30.0 - 36.5 g/dL RDW 21.9 (H) 11.5 - 14.5 % PLATELET 902 900 - 806 K/uL MPV 9.1 8.9 - 12.9 FL  
 NRBC 0.0 0  WBC ABSOLUTE NRBC 0.00 0.00 - 0.01 K/uL NEUTROPHILS 62 32 - 75 % LYMPHOCYTES 19 12 - 49 % MONOCYTES 15 (H) 5 - 13 % EOSINOPHILS 2 0 - 7 % BASOPHILS 1 0 - 1 % IMMATURE GRANULOCYTES 1 (H) 0.0 - 0.5 % ABS. NEUTROPHILS 3.8 1.8 - 8.0 K/UL  
 ABS. LYMPHOCYTES 1.2 0.8 - 3.5 K/UL  
 ABS. MONOCYTES 0.9 0.0 - 1.0 K/UL  
 ABS. EOSINOPHILS 0.1 0.0 - 0.4 K/UL  
 ABS. BASOPHILS 0.1 0.0 - 0.1 K/UL  
 ABS. IMM. GRANS. 0.1 (H) 0.00 - 0.04 K/UL  
 DF SMEAR SCANNED    
 RBC COMMENTS ANISOCYTOSIS 2+ 
    
 RBC COMMENTS POLYCHROMASIA 1+ 
    
 RBC COMMENTS TEARDROP CELLS 
PRESENT 
    
METABOLIC PANEL, COMPREHENSIVE Collection Time: 01/27/21  1:14 PM  
Result Value Ref Range Sodium 142 136 - 145 mmol/L Potassium 3.8 3.5 - 5.1 mmol/L Chloride 112 (H) 97 - 108 mmol/L  
 CO2 23 21 - 32 mmol/L Anion gap 7 5 - 15 mmol/L Glucose 182 (H) 65 - 100 mg/dL BUN 26 (H) 6 - 20 MG/DL Creatinine 1.29 (H) 0.55 - 1.02 MG/DL  
 BUN/Creatinine ratio 20 12 - 20 GFR est AA 51 (L) >60 ml/min/1.73m2 GFR est non-AA 42 (L) >60 ml/min/1.73m2 Calcium 8.8 8.5 - 10.1 MG/DL Bilirubin, total 0.4 0.2 - 1.0 MG/DL  
 ALT (SGPT) 34 12 - 78 U/L  
 AST (SGOT) 18 15 - 37 U/L Alk. phosphatase 58 45 - 117 U/L Protein, total 7.4 6.4 - 8.2 g/dL Albumin 3.3 (L) 3.5 - 5.0 g/dL Globulin 4.1 (H) 2.0 - 4.0 g/dL A-G Ratio 0.8 (L) 1.1 - 2.2 MAGNESIUM Collection Time: 01/27/21  1:14 PM  
Result Value Ref Range Magnesium 1.6 1.6 - 2.4 mg/dL CANCER ANTIGEN 125 Collection Time: 01/27/21  1:14 PM  
Result Value Ref Range CA-125 37 (H) 1.5 - 35.0 U/mL TYPE & SCREEN Collection Time: 01/27/21  1:14 PM  
Result Value Ref Range Crossmatch Expiration 01/30/2021,2359 ABO/Rh(D) O POSITIVE Antibody screen NEG   
IRON PROFILE Collection Time: 01/27/21  1:14 PM  
Result Value Ref Range Iron 65 35 - 150 ug/dL TIBC 293 250 - 450 ug/dL Iron % saturation 22 20 - 50 % URINALYSIS W/ RFLX MICROSCOPIC Collection Time: 01/27/21  3:37 PM  
Result Value Ref Range Color FABIENNE Appearance TURBID (A) CLEAR  Specific gravity 1.017 1.003 - 1.030    
 pH (UA) 5.5 5.0 - 8.0 Protein 300 (A) NEG mg/dL Glucose Negative NEG mg/dL Ketone Negative NEG mg/dL Blood LARGE (A) NEG Urobilinogen 0.2 0.2 - 1.0 EU/dL Nitrites Negative NEG Leukocyte Esterase LARGE (A) NEG URINE CULTURE HOLD SAMPLE Collection Time: 01/27/21  3:37 PM  
 Specimen: Serum Result Value Ref Range Urine culture hold Urine on hold in Microbiology dept for 2 days. If unpreserved urine is submitted, it cannot be used for addtional testing after 24 hours, recollection will be required. Medications Administered 0.9% sodium chloride infusion Admin Date 
01/27/2021 Action New Bag Dose 25 mL/hr Rate 25 mL/hr Route IntraVENous Administered By 
[employer] New  
  
  
 0.9% sodium chloride injection 10 mL Admin Date 
01/27/2021 Action Given Dose 
10 mL Route IntraVENous Administered By 
[employer] New  
  
  
 dexamethasone (DECADRON) 12 mg in 0.9% sodium chloride 50 mL, overfill volume 5 mL IVPB Admin Date 
01/27/2021 Action Given Dose 
12 mg Rate 232 mL/hr Route IntraVENous Administered By 
[employer] New  
  
  
 diphenhydrAMINE (BENADRYL) injection 50 mg   
 Admin Date 
01/27/2021 Action Given Dose 50 mg Route IntraVENous Administered By 
[employer] New  
  
  
 famotidine (PF) (PEPCID) 20 mg in 0.9% sodium chloride 10 mL injection Admin Date 
01/27/2021 Action Given Dose 
20 mg Route IntraVENous Administered By 
[employer] New  
  
  
 heparin (porcine) pf 300-500 Units Admin Date 
01/27/2021 Action Given Dose 
500 Units Route InterCATHeter Administered By 
[employer] New PACLitaxeL (TAXOL) 197 mg in 0.9% sodium chloride 250 mL, overfill volume 25 mL chemo infusion Admin Date 
01/27/2021 Action New Bag Dose 
197 mg Rate 
307.8 mL/hr Route IntraVENous Administered By 
[employer] New  
  
  
 palonosetron HCl (ALOXI) injection 0.25 mg   
 Admin Date 
01/27/2021 Action Given Dose 0.25 mg Route IntraVENous Administered By 
Lynn Sahver  
  
  
 saline peripheral flush soln 10 mL Admin Date 
01/27/2021 Action Given Dose 
10 mL Route InterCATHeter Administered By 
Lynn Shaver Ms. Hong López tolerated treatment well, port flushed and de accessed, patient was discharged from [place] [address] in stable condition at 1700. Future Appointments Date Time Provider Gabriel Phillip 1/28/2021  9:00 AM VASCULAR ROOM 1 [facility]  
2/3/2021  8:00 AM F4 [facility] MED [address] [facility]  
2/10/2021  8:00 AM [facility] MED [facility]  
2/16/2021 10:45 AM Glen Sullivan MD [facility] CHIKI Simpson January 27, 2021

## 2021-01-27 NOTE — PROGRESS NOTES
[address], [address] [place], [facility] 
P [phone]  F [phone] Office Visit Patient ID: 
Name: Rahel Griffiths MRN: [id number] : 1960/60 y.o. Visit date: 2021 LEONID Wong is an established patient 62 y.o.  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 The patient's previous treatment include primary debulking and adjuvant Taxol/carbo with retreatment in  for recurrence. Subsequent lines of chemo as outlined below. She has most recently started on retrial of single agent weekly Taxol in 2020 following progression with chest wall involvement while on PARPi. Most recent EOD imaging in  showed overall response to therapy. She is s/p R ureteral stenting in situ.   
   
 
OncTx History: 
12: MARAH/BSO/Cytoreductive surgery on 12 noted carcinomatosis/omental caking. 10/2012-2013: 6 cycles Carbo/Taxol 2014-2014: 6 Cycles carbo/Taxol 3/2015-5/13/15: 3 cycles Avastin/Doxil until progression 2015-2015 Weekly Topotecan  
2015-3/2016: Weekly Topotecan 2017-2017Malon Lock D1/D8 Q28 days for 6 cycles 2018: CT core needle bx chest wall mass: Metastatic high-grade serous carcinoma (see comment) Positive for malignancy. 2018-2018: Taxol weekly 2019: 10 fx 30cGy palliative RT to sternal lesion 2019 - 2020 [place]. Underwent right ureteral stent placement on 3/18/19. 
2020: CT CAP variable response to therapy with several lesions decreased in size. However a majority of the lesions have increased in size consistent with progression of disease with involvement of the mediastinum, liver masses, and peritoneal carcinomatosis. Increase right-sided hydronephrosis. Increasing . 
2020 - initiated Taxol weekly 10/2020 - RLE DVT on Eliquis 1/2021: CT CAP Response to therapy with significant decrease in size of thoracic lymphadenopathy, liver metastases, abdominal lymphadenopathy, and pelvic lymphadenopathy. Slight increased mild left hydronephrosis of uncertain significance. CT Results (most recent): 
Results from [facility] Encounter encounter on 01/20/21 CT ABD PELV W CONT Narrative INDICATION:   Right ovarian cancer EXAM:  CT chest, abdomen, and pelvis WITH  CONTRAST 
 
COMPARISON:  CT chest abdomen and pelvis 6/24/2020 TECHNIQUE:  Thin collimation axial images were obtained through the chest, 
abdomen, and pelvis with IV contrast administration. Coronal and sagittal 
reconstructions were obtained. Oral contrast was administered. CT dose 
reduction was achieved through use of a standardized protocol tailored for this 
examination and automatic exposure control for dose modulation. FINDINGS: 
 
Chest: 
 
LUNGS: Medial right middle lobe and minimal medial left upper lobe opacities 
favor scarring. Minimal left basilar subsegmental atelectasis versus scarring. No other significant pulmonary abnormality. No suspicious nodule or mass. The 
central airways are patent. LYMPH NODES: Decrease in size of bulky prevascular anterior mediastinal and 
middle mediastinal lymphadenopathy. Prevascular lymph node measures 5.0 x 1.6 cm 
(series 3, image 21), compared to 6.0 x 2.5 cm previously. Middle mediastinal 
lymph node measures 1.7 x 1.67 m (image 28), compared to 2.4 x 2.3 cm. Anterior 
cardiophrenic lymphadenopathy measures smaller measuring 3.7 cm (image 41), 
compared to 4.4 cm. PLEURAL FLUID: No pleural effusion. PERICARDIAL FLUID: No pericardial effusion. THYROID: No thyroid nodule. OTHER: Left chest port extends to the proximal right atrium. Abdomen: LIVER: Decreased bulky left hepatic low-density lesions. A representative lesion 
measures 4.2 x 1.8 cm (image 51), compared to 5.8 x 3.8 cm. A few low-density lesions in the right hepatic lobe are relatively stable with a representative 
lesion measuring 3.5 cm (image 60). No biliary ductal dilatation. GALLBLADDER: Decompressed without calcified stone. SPLEEN: Decreased infiltrative mass at the hilum measured 3.4 cm (image 52), 
compared to 5.8 cm previously. PANCREAS: No mass or ductal dilatation. ADRENALS: Unremarkable. KIDNEYS/URETERS: Symmetric nephrograms with mild bilateral renal cortical 
atrophy and scarring. Stable right ureteral stent with relatively unchanged mild 
to moderate right hydronephrosis. Slight increased mild left hydronephrosis of 
uncertain significance. PERITONEUM: Central mesenteric mass lesions are markedly smaller. A 
representative lesion measures 7.0 x 3.9 cm (image 66), compared to 9.9 x 5.6 
cm. Decrease in size in left peritoneal mass measuring 6.5 cm (image 57), 
compared to 7.4 cm. Retroperitoneal masses smaller with a representative lesion 
measuring 4.7 x 3.5 cm (image 79), compared to 6.5 x 4.0 cm. No significant 
ascites. COLON: No dilatation or wall thickening. APPENDIX: Not visualized SMALL BOWEL: No dilatation or wall thickening. STOMACH: Unremarkable. Pelvis: PELVIS: Decrease in pelvic lymphadenopathy and size of pelvic masses. A left 
external iliac chain lymph node measures 17 mm in short axis (image 107), 
compared to 32 mm previously. A right lower pelvic mass measures 5.3 cm in 
greatest length (image 109), compared to 7.0 cm. No pelvic free fluid. BONES: Stable appearance of the bones with sclerotic lesion in the sternum ADDITIONAL COMMENTS: N/A Impression Response to therapy with significant decrease in size of thoracic 
lymphadenopathy, liver metastases, abdominal lymphadenopathy, and pelvic 
lymphadenopathy. Slight increased mild left hydronephrosis of uncertain significance. CT 6/2020: 
FINDINGS:  
THYROID: No nodule. MEDIASTINUM: Left paratracheal adenopathy now measures 3.7 x 2.0 cm increased in 
size. Mass in AP window which extends superiorly into the left infraclavicular 
region and inferiorly into the left hilum is also increase in size. When 
measured in a similar fashion this measures 3.5 x 2.4 previously 3.0 x 2.9 cm. Right parasternal mass is decreased in size measuring 3.4 x 3.4 cm. Subcarinal 
adenopathy measures 2.8 x 2.3 cm increased in size. There is increased size of 
the nodular component adjacent to the xiphoid now measuring 2.6 x 3.3 cm. SWATI: Left hilar lymph node measures 3.2 x 2.4 cm previously 2.5 x 1.8 cm. THORACIC AORTA: No dissection or aneurysm. MAIN PULMONARY ARTERY: Normal in caliber. TRACHEA/BRONCHI: Patent. ESOPHAGUS: No wall thickening or dilatation. HEART: Normal in size. PLEURA: No effusion or pneumothorax. LUNGS: No nodule, mass, or airspace disease. LIVER: Multiple masses are noted and better visualized on current study 
secondary to the addition of intravenous contrast. Comparison lesions measure 
previously, there is a 3.5 x 2.8 cm mass in the right hepatic lobe series 3 
image 58 slightly increased in size. There is a 3.6 x 2.9 cm mass in the left 
hepatic lobe slightly decreased in size series 3 image 56. Left hepatic lobe 
mass series 3 image 45 measuring 4.0 x 2.8 cm slightly increased in size. There 
is extensive tumor infiltration in the liver hilum with narrowing of the main 
portal vein as well as the intrahepatic portal veins. The common hepatic artery 
is not well visualized and likely occluded with some collateral flow noted in 
the liver hilum. GALLBLADDER: Not visualized SPLEEN: Mass in the splenic hilum increased in size measuring 7.4 x 4.3 cm with 
invasion into the splenic hilum. PANCREAS: The pancreas is encased by tumor. ADRENALS: There is likely involvement of the left adrenal gland tumor. KIDNEYS: Right nephroureteral stent with mild to moderate right-sided hydronephrosis. STOMACH: Tumor encasing the gastric antrum. SMALL BOWEL: No dilatation or wall thickening. COLON: Tumor abutting the tumor encasing the rectosigmoid junction. Tumor 
abutting the transverse colon. APPENDIX: Unremarkable. PERITONEUM: Some free fluid noted in the pelvis. Increased size of mesenteric 
masses. Representative examples: 7.8 x 4.3 cm tumor anterior peritoneum series 3 
image 84. No pelvic tumor 7.5 x 4.8 cm series 3 image 100. RETROPERITONEUM:  
Right pelvic sidewall increased in size. Large right lateral lymph node 
measuring 3.1 x 2.0 cm not significantly changed. REPRODUCTIVE ORGANS: The visualized URINARY BLADDER: No mass or calculus. BONES: No destructive bone lesion. ADDITIONAL COMMENTS: N/A 
  
IMPRESSION IMPRESSION: 
1. There is some variable response to therapy with several lesions decreased in 
size. However a majority of the lesions have increased in size consistent with 
progression of disease with involvement of the mediastinum, liver masses, and 
peritoneal carcinomatosis. 2.  Increase right-sided hydronephrosis. Tiffanie Govea SUBJECTIVE 1/27/2021: 
Presents to [facility]. No distress 
-C/o more sob last few days limited to ambulation. Remains able to exercise on stationary bike and w/ exercise videos at her own pace. Son feels she is holding her breath. Admits hard to breath with mask. Asthma medications help. Chronic SOB with extended activity.  
-Return of dysuria/hematuria. Has appt with urologist Feb 1. She had cystoscopy with stent xchange in October with \"manipulation. \"  She has a visit with her urologist next week. -C/o acute right leg pain last night. She remains on eliquis for persistent RLE DVT. Swelling/discomfort has subsided. She denies any abd/chest/back pain currently. She ambulates at home with rare rollator or cane use if acute right knee pain d/t extensive OA. Goes up/down stairs slowly and unassisted currently. No weakness/light headedness. No falls.   
-No N/V. Appetite stable. She drinks 4 bottles water/day with gatorade. +BM daily to every other day with miralax. Takes tramadol nightly for generalized discomforts. 
-recent sleep study notes nocturnal apneic episodes. Using CPAP. -She has chronic pedal/hand numbness/tingling due to taxol and DM. She wakes from sleep occasionally d/t this but resumes sleep easily. Follows with podiatrist. Denies any change in depression, continues medication. Two adult sons at home who cook for her. She is also fosters a 15 y/o boy, previously two boys. She is contemplating adopting him. Confirms if she passes, her son Satish will adopt him. She has discussed this with the adoption agency. ROS 10 pt negative except as per above PMH: 
Past Medical History:  
Diagnosis Date  Adverse effect of anesthesia   
 sleep apnea cpap machine useage  Anemia  Arthritis DDD low back and knees  Asthma   
 uses albuterol prn only; none x 6 mos. Never hospitalized  BRCA negative 1/2013  Calculus of kidney  Chronic kidney disease   
 kidney stones  Chronic pain   
 knee pain bilat  CINV (chemotherapy-induced nausea and vomiting) 8/21/2014  Decreased hearing, right PATIENT STATES THIS IS DUE TO CHEMOTHERAPY  Diabetes ([facility] [address] Age 46 IDDM  Environmental allergies  Fibromyalgia  GERD (gastroesophageal reflux disease)   
 controlled with med  Hx of pulmonary embolus during pregnancy 1/18/2015  Hydronephrosis due to obstruction of ureter 3/18/2019  Hypertension  Ill-defined condition  Sepsis 9-2015 -  SOURCE PORT  
  Ill-defined condition 2016  
 chemotherapy  Mass of chest wall 2018  Morbid obesity ([facility] [address]  Murmur, heart  Other and unspecified hyperlipidemia  Ovarian cancer ([facility] [address] 2012, 2014  
 serous, high grade, stage IIIc. s/p chemotx (has port)  Psychiatric disorder Depression/Anxiety  Rectal bleeding  Thromboembolus ([facility] [address]  POST PARTUM; resolved- PELVIS  Thyroid disease HYPOTHYROID  Unspecified sleep apnea CPAP, Dr. Fede Schmitz PSH: 
Past Surgical History:  
Procedure Laterality Date   
  X2  
 HX HYSTERECTOMY  2012 ROULA/BSO, tumor debulking, omentectomy for ovarian cancer  HX ORTHOPAEDIC Right   
 ankle-FX, R  
 HX ORTHOPAEDIC Right   
 FX R ARM  
 HX UROLOGICAL Right  STENT FOR KIDNEY STONES  
 HX VASCULAR ACCESS  2015  
 right chest- port placed  HX VASCULAR ACCESS Left  TETE CATH  
 DE BREAST SURGERY PROCEDURE UNLISTED Lymph node in left breast 2017 SOC: 
Social History Socioeconomic History  Marital status:  Spouse name: Not on file  Number of children: Not on file  Years of education: Not on file  Highest education level: Not on file Occupational History  Not on file Social Needs  Financial resource strain: Not on file  Food insecurity Worry: Not on file Inability: Not on file  Transportation needs Medical: Not on file Non-medical: Not on file Tobacco Use  Smoking status: Never Smoker  Smokeless tobacco: Never Used Substance and Sexual Activity  Alcohol use: Yes Comment: 1 drink per month  Drug use: No  
 Sexual activity: Yes Lifestyle  Physical activity Days per week: Not on file Minutes per session: Not on file  Stress: Not on file Relationships  Social connections Talks on phone: Not on file Gets together: Not on file Attends [employer] service: Not on file Active member of club or organization: Not on file Attends meetings of clubs or organizations: Not on file Relationship status: Not on file  Intimate partner violence Fear of current or ex partner: Not on file Emotionally abused: Not on file Physically abused: Not on file Forced sexual activity: Not on file Other Topics Concern  Not on file Social History Narrative Lives in [facility] [facility] alone.  passed away in 5/16 of a heart attack. Has 2 sons. Disability. 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 Enjoys swimming and going to the gym. Family History Family History Problem Relation Age of Onset  Hypertension Mother Norah Pappas Arthritis-osteo Mother  Hypertension Father  Arthritis-osteo Father  Cancer Father PROSTATE  COPD Father  Hypertension Brother  Elevated Lipids Brother  Arthritis-osteo Brother  Hypertension Brother  Elevated Lipids Brother  Obesity Brother  Cancer Brother PROSTATE  Anesth Problems Son DELAYED AWAKENING  
 Diabetes Maternal Grandmother  Anesth Problems Paternal Grandmother PATIENT STATES GRANDMOTHER'S HEART STOPPED DURING SURGERY Medications: (reviewed) Current Outpatient Medications on File Prior to Encounter Medication Sig Dispense Refill  predniSONE (DELTASONE) 50 mg tablet Take 1 tablet 13 hours prior to scan, take 1 tablet 7 hours prior to scan and take 1 tablet one hour prior to scan 3 Tab 0  
 omega-3 acid ethyl esters (LOVAZA) 1 gram capsule TAKE 1 CAPSULE BY MOUTH TWICE A  Cap 2  
 rosuvastatin (CRESTOR) 10 mg tablet Take 10 mg by mouth nightly.  doxazosin (CARDURA) 1 mg tablet Take  by mouth daily.  doxazosin (CARDURA) 1 mg tablet Take 1 mg by mouth daily.  SULFAMETHOXAZOLE-TRIMETHOPRIM PO Take  by mouth two (2) times a day. 7 days  dexAMETHasone (DECADRON) 4 mg tablet Take 2 tablets with breakfast the day before chemo and for 2 days after chemo 36 Tab 2  
 lidocaine-prilocaine (EMLA) topical cream Apply small amount over port area and cover with band aid one hour before chemo 30 g 3  
 ondansetron (ZOFRAN ODT) 4 mg disintegrating tablet Take 1 Tab by mouth every eight (8) hours as needed for Nausea. Indications: nausea and vomiting caused by cancer drugs 30 Tab 2  
 fluticasone propionate (FLONASE) 50 mcg/actuation nasal spray 2 SPRAYS IN BOTH NOSTRILS DAILY 1 Bottle 0  
 cycloSPORINE (RESTASIS) 0.05 % dpet  albuterol (PROVENTIL VENTOLIN) 2.5 mg /3 mL (0.083 %) nebu Take 2.5 mg by inhalation.  dextroamphetamine-amphetamine (ADDERALL) 20 mg tablet TAKE 1 TABLET BY MOUTH EVERY DAY  0  
 rosuvastatin (CRESTOR) 10 mg tablet TAKE 1 TABLET BY MOUTH EVERY DAY  0  
 telmisartan (MICARDIS) 20 mg tablet TAKE 1 TABLET BY MOUTH EVERY DAY 90 Tab 3  
 atorvastatin (LIPITOR) 20 mg tablet  BD ULTRA-FINE SHORT PEN NEEDLE 31 gauge x 5/16\" ndle USE TO INJECT INSULIN 5 TIMES DAILY 200 Pen Needle 11  
 insulin aspart U-100 (NOVOLOG FLEXPEN U-100 INSULIN) 100 unit/mL (3 mL) inpn INJECT 20 UNITS BEFORE EACH MEAL + 4 UNITS FOR EVERY 50 MG/DL ABOVE 150 MG/DL.  UNITS PER DAY 90 Adjustable Dose Pre-filled Pen Syringe 3  
 LEVEMIR FLEXTOUCH U-100 INSULN 100 unit/mL (3 mL) inpn INJECT 30 UNITS IN AM AND 50 UNITS AT NIGHT. INCREASE AS DIRECTED TO  UNITS/DAY. 90 Adjustable Dose Pre-filled Pen Syringe 3  DEXILANT 60 mg CpDB capsule (delayed release) TAKE 1 CAPSULE BY MOUTH EVERY DAY 90 Cap 3  
 LINZESS 145 mcg cap capsule TAKE 1 CAPSULE BY MOUTH EVERY DAYY 30 Cap 0  
 levothyroxine (SYNTHROID) 150 mcg tablet TAKE 1 TABLET BY MOUTH EVERY DAY BEFORE BREAKFAST 90 Tab 0  
 EPINEPHrine (EPIPEN) 0.3 mg/0.3 mL injection INJECT 0.3 ML BY INTRAMUSCULAR ROUTE ONCE AS NEEDED FOR UP TO 1 DOSE.  1  
  glucose blood VI test strips (TRUE METRIX GLUCOSE TEST STRIP) strip by Does Not Apply route See Admin Instructions. 30 Strip 5  
 lancets misc by Does Not Apply route daily. True Metrix lancets- test blood sugar once per day 30 Each 5  Blood-Glucose Meter monitoring kit Check up to tid, as directed 1 Kit 0  
 azelastine (ASTELIN) 137 mcg (0.1 %) nasal spray 1 [place] by Both Nostrils route daily as needed. Use in each nostril as directed  insulin detemir U-100 (LEVEMIR) 100 unit/mL injection 20 Units by SubCUTAneous route daily (with lunch).  montelukast (SINGULAIR) 10 mg tablet Take 10 mg by mouth nightly.  ascorbic acid, vitamin C, (VITAMIN C) 1,000 mg tablet Take 1,000 mg by mouth daily.  albuterol (PROVENTIL VENTOLIN) 2.5 mg /3 mL (0.083 %) nebulizer solution 2.5 mg by Nebulization route every four (4) hours as needed for Wheezing.  Liraglutide (VICTOZA) 0.6 mg/0.1 mL (18 mg/3 mL) pnij 1.8 mg by SubCUTAneous route daily (with lunch). 3 Pen 3  
 SYMBICORT 160-4.5 mcg/actuation HFAA Take 2 Puffs by inhalation two (2) times daily as needed. 2 Inhaler 3  clonazePAM (KLONOPIN) 0.5 mg tablet Take 0.5 mg by mouth nightly as needed.  sertraline (ZOLOFT) 50 mg tablet TAKE 1 TABLET BY MOUTH as needed  1  cholecalciferol, VITAMIN D3, (VITAMIN D3) 5,000 unit tab tablet Take 5,000 Units by mouth daily.  furosemide (LASIX) 40 mg tablet Take 40 mg by mouth every other day. 3 No current facility-administered medications on file prior to encounter. Allergies: (reviewed) Allergies Allergen Reactions  Carboplatin Other (comments) Patient developed shortness of breath, felt faint, coughing, skin flushed and \"itchy\". This occurred on the patients 8th treatment.  Iodinated Contrast Media Rash 13 hr pre-medication prior to IV contrast.  
Patient has done well with 1 hr pre-medication of (Solu-Medrol) 40mg &  (Benadryl) 50mg.  Lipitor [Atorvastatin] Myalgia  Shellfish Containing Products Hives  Tape [Adhesive] Itching and Other (comments) \"op site-- clear,thin tape\"--caused blister  Tomato Hives  Iodine And Iodide Containing Products Rash  Olmesartan Other (comments)  Losartan Other (comments)  
  headaches  Percocet [Oxycodone-Acetaminophen] Other (comments) Fever; however, current home med OBJECTIVE Physical Exam 
Visit Vitals BP (!) 152/84 (BP 1 Location: Right arm, BP Patient Position: Sitting) Pulse 75 Temp 97.2 °F (36.2 °C) Ht 5' 8\" (1.727 m) Wt 273 lb (123.8 kg) LMP 09/07/2011 Breastfeeding No  
BMI 41.51 kg/m² GENERAL BRIAN: Conversant, pleasant, alert, oriented. NAD HEENT: Oropharynx clear. Sclera anicteric. No JVD. No adenopathy CV: RRR  
RESPIRATORY: CTA bilat GASTROINT: soft, non-tender, without masses or organomegaly, no fluid wave. Habitus limits exam. Previous RUQ nodule gone EXTREMITIES: bilat 1+ edema/girth nonpitting. RLE > LLE. Nontender here. Feet warm, mobile, sensate. ALCON SURVEY: Negative NEURO: Grossly intact, no acute deficit. Oriented. Not depressed. Not agitated. Wt Readings from Last 3 Encounters:  
01/27/21 273 lb (123.8 kg) 01/13/21 273 lb (123.8 kg) 01/06/21 271 lb (122.9 kg) DATE REVIEW as available: 
Lab Results Component Value Date/Time WBC 6.2 01/27/2021 01:14 PM  
 Hemoglobin (POC) 10.5 (L) 03/20/2017 12:15 PM  
 HGB 6.6 (L) 01/27/2021 01:14 PM  
 Hematocrit (POC) 31 (L) 03/20/2017 12:15 PM  
 HCT 21.7 (L) 01/27/2021 01:14 PM  
 PLATELET 957 61/02/2143 01:14 PM  
 MCV 96.0 01/27/2021 01:14 PM  
 
Lab Results Component Value Date/Time  
 ABS. NEUTROPHILS 3.8 01/27/2021 01:14 PM  
 
Lab Results Component Value Date/Time  Sodium 142 01/27/2021 01:14 PM  
 Potassium 3.8 01/27/2021 01:14 PM  
 Chloride 112 (H) 01/27/2021 01:14 PM  
 CO2 23 01/27/2021 01:14 PM  
 Anion gap 7 01/27/2021 01:14 PM  
 Glucose 182 (H) 01/27/2021 01:14 PM  
 BUN 26 (H) 01/27/2021 01:14 PM  
 Creatinine 1.29 (H) 01/27/2021 01:14 PM  
 BUN/Creatinine ratio 20 01/27/2021 01:14 PM  
 GFR est AA 51 (L) 01/27/2021 01:14 PM  
 GFR est non-AA 42 (L) 01/27/2021 01:14 PM  
 Calcium 8.8 01/27/2021 01:14 PM  
 Bilirubin, total 0.4 01/27/2021 01:14 PM  
 Alk. phosphatase 58 01/27/2021 01:14 PM  
 Protein, total 7.4 01/27/2021 01:14 PM  
 Albumin 3.3 (L) 01/27/2021 01:14 PM  
 Globulin 4.1 (H) 01/27/2021 01:14 PM  
 A-G Ratio 0.8 (L) 01/27/2021 01:14 PM  
 ALT (SGPT) 34 01/27/2021 01:14 PM  
 
 
CA-125 Date Value Ref Range Status 01/27/2021 37 (H) 1.5 - 35.0 U/mL Final  
  Comment:  
  ** Note new reference range and method ** Results may vary depending on . Method used is [email or web address] 
  
12/29/2020 46 (H) 1.5 - 35.0 U/mL Final  
  Comment:  
  ** Note new reference range and method ** Results may vary depending on . Method used is [email or web address] 
  
12/03/2020 48 (H) 1.5 - 35.0 U/mL Final  
  Comment:  
  ** Note new reference range and method ** Results may vary depending on . Method used is [email or web address] 
  
11/05/2020 52 (H) 1.5 - 35.0 U/mL Final  
  Comment:  
  ** Note new reference range and method ** Results may vary depending on . Method used is [email or web address] 10/08/2020 116 (H) 1.5 - 35.0 U/mL Final  
  Comment:  
  ** Note new reference range and method ** Results may vary depending on . Method used is [email or web address] ECHO 1/2020 · Normal cavity size and systolic function (ejection fraction normal). Mild concentric hypertrophy. Estimated left ventricular ejection fraction is 55 - 60%. Visually measured ejection fraction. No regional wall motion abnormality noted. · Mildly dilated left atrium. · Mild aortic valve sclerosis. · Trace mitral valve regurgitation is present.  
 
 
IMPRESSION AND PLAN: 
 62 y.o. with diffusely metastatic ovarian cancer. Continues on palliative chemo, retrial taxol Labs/chart reviewed 
-C7 Taxol, ongoing, responsive with decreased lesions 
-CKD stage 2-3 - stable 
-Right hydronephrosis/dysuria/hematuria - stent in place. Exchanged 2020. Recurrent UTI, recheck today, Rx sent. F/u urology. Noted new mild left hydro. Compensated.  
-Liver mets - asx 
-Pain d/t disease/neuropathy -tramadol PRN 
-RLE DVT acute/chronic - on NOAC. Eval US, ?recurrence. If+ consider LMWH/VKA therapy, caval filter. On discussion, recently sister  due to apparent unprovoked DVT. Other Family with DVT. Patient has hx of miscarriage. Consider referral to Hematology. 
-Anemia - d/t disease/chemo/hematuria. Stable. Fe panel normal. No CVD, signs of End organ dysfunction. Transfuse for overt sx. -ECKERT/debility - Hx Asthma, chronic due to condition, anemia. Advised while ambulating, coordinate work of breathing, remove mask if needed. Recent CT hx atelectasis. No evidence of PE/effusion. IS support discussed. -IDDM2/depression - controlled overall. -CIPN/diabetic polyneuropathy - not in pain, monitor. Discussed home safety. -SHABANA - CPAP compliance encouraged 
-Wt loss, prot smiley malnutrition - supplements/alimentation as best as able. -Psychosocial - she has some fear about the future, currently very pleased with progress. She is well supported, isaac remains very important. Monitor depressive sx mgmt. Discussed home life and plans for the future. Plans for adopting her foster child with support of her son Satish. 
 
21 I have spent 45 minutes reviewing previous notes/chart, test results and face to face with the patient discussing the diagnosis and importance of compliance with the treatment plan. Electronically signed Angelina Fernandez PA-C 
2021

## 2021-01-28 NOTE — PROGRESS NOTES
Telephone visit:  1/28/2021     Doppler US results called to patient. No new clot, improved chronic DVT. Analgesia available PRN. Continue on [address]. UA +, complete abx course. No F/C, back pain. Recurrent UTI. F/u with urology pending.     Dede Purdy PA-C

## 2021-02-03 NOTE — PROGRESS NOTES
[address] [facility], [address] [place], [facility] 
P [phone]  F [phone] Office Visit Patient ID: 
Name: Danae Buchanan MRN: [id number] : 1960/60 y.o. Visit date: 2/3/2021 LEONID Loja is an established patient 62 y.o.  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 The patient's previous treatment include primary debulking and adjuvant Taxol/carbo with retreatment in  for recurrence. Subsequent lines of chemo as outlined below. She has most recently started on retrial of single agent weekly Taxol in 2020 following progression with chest wall involvement while on PARPi. Most recent EOD imaging in  showed overall response to therapy. She is s/p R ureteral stenting in situ.   
   
 
OncTx History: 
12: MARAH/BSO/Cytoreductive surgery on 12 noted carcinomatosis/omental caking. 10/2012-2013: 6 cycles Carbo/Taxol 2014-2014: 6 Cycles carbo/Taxol 3/2015-5/13/15: 3 cycles Avastin/Doxil until progression 2015-2015 Weekly Topotecan  
2015-3/2016: Weekly Topotecan 2017-2017Renaye Sieving D1/D8 Q28 days for 6 cycles 2018: CT core needle bx chest wall mass: Metastatic high-grade serous carcinoma (see comment) Positive for malignancy. 2018-2018: Taxol weekly 2019: 10 fx 30cGy palliative RT to sternal lesion 2019 - 2020 [place]. Underwent right ureteral stent placement on 3/18/19. 
2020: CT CAP variable response to therapy with several lesions decreased in size. However a majority of the lesions have increased in size consistent with progression of disease with involvement of the mediastinum, liver masses, and peritoneal carcinomatosis. Increase right-sided hydronephrosis. Increasing . 
2020 - initiated Taxol weekly 10/2020 - RLE DVT on Eliquis 1/2021: CT CAP Response to therapy with significant decrease in size of thoracic lymphadenopathy, liver metastases, abdominal lymphadenopathy, and pelvic lymphadenopathy. Slight increased mild left hydronephrosis of uncertain significance. CT Results (most recent): 
Results from [facility] Encounter encounter on 01/20/21 CT ABD PELV W CONT Narrative INDICATION:   Right ovarian cancer EXAM:  CT chest, abdomen, and pelvis WITH  CONTRAST 
 
COMPARISON:  CT chest abdomen and pelvis 6/24/2020 TECHNIQUE:  Thin collimation axial images were obtained through the chest, 
abdomen, and pelvis with IV contrast administration. Coronal and sagittal 
reconstructions were obtained. Oral contrast was administered. CT dose 
reduction was achieved through use of a standardized protocol tailored for this 
examination and automatic exposure control for dose modulation. FINDINGS: 
 
Chest: 
 
LUNGS: Medial right middle lobe and minimal medial left upper lobe opacities 
favor scarring. Minimal left basilar subsegmental atelectasis versus scarring. No other significant pulmonary abnormality. No suspicious nodule or mass. The 
central airways are patent. LYMPH NODES: Decrease in size of bulky prevascular anterior mediastinal and 
middle mediastinal lymphadenopathy. Prevascular lymph node measures 5.0 x 1.6 cm 
(series 3, image 21), compared to 6.0 x 2.5 cm previously. Middle mediastinal 
lymph node measures 1.7 x 1.67 m (image 28), compared to 2.4 x 2.3 cm. Anterior 
cardiophrenic lymphadenopathy measures smaller measuring 3.7 cm (image 41), 
compared to 4.4 cm. PLEURAL FLUID: No pleural effusion. PERICARDIAL FLUID: No pericardial effusion. THYROID: No thyroid nodule. OTHER: Left chest port extends to the proximal right atrium. Abdomen: LIVER: Decreased bulky left hepatic low-density lesions. A representative lesion 
measures 4.2 x 1.8 cm (image 51), compared to 5.8 x 3.8 cm. A few low-density lesions in the right hepatic lobe are relatively stable with a representative 
lesion measuring 3.5 cm (image 60). No biliary ductal dilatation. GALLBLADDER: Decompressed without calcified stone. SPLEEN: Decreased infiltrative mass at the hilum measured 3.4 cm (image 52), 
compared to 5.8 cm previously. PANCREAS: No mass or ductal dilatation. ADRENALS: Unremarkable. KIDNEYS/URETERS: Symmetric nephrograms with mild bilateral renal cortical 
atrophy and scarring. Stable right ureteral stent with relatively unchanged mild 
to moderate right hydronephrosis. Slight increased mild left hydronephrosis of 
uncertain significance. PERITONEUM: Central mesenteric mass lesions are markedly smaller. A 
representative lesion measures 7.0 x 3.9 cm (image 66), compared to 9.9 x 5.6 
cm. Decrease in size in left peritoneal mass measuring 6.5 cm (image 57), 
compared to 7.4 cm. Retroperitoneal masses smaller with a representative lesion 
measuring 4.7 x 3.5 cm (image 79), compared to 6.5 x 4.0 cm. No significant 
ascites. COLON: No dilatation or wall thickening. APPENDIX: Not visualized SMALL BOWEL: No dilatation or wall thickening. STOMACH: Unremarkable. Pelvis: PELVIS: Decrease in pelvic lymphadenopathy and size of pelvic masses. A left 
external iliac chain lymph node measures 17 mm in short axis (image 107), 
compared to 32 mm previously. A right lower pelvic mass measures 5.3 cm in 
greatest length (image 109), compared to 7.0 cm. No pelvic free fluid. BONES: Stable appearance of the bones with sclerotic lesion in the sternum ADDITIONAL COMMENTS: N/A Impression Response to therapy with significant decrease in size of thoracic 
lymphadenopathy, liver metastases, abdominal lymphadenopathy, and pelvic 
lymphadenopathy. Slight increased mild left hydronephrosis of uncertain significance. CT 6/2020: 
FINDINGS:  
THYROID: No nodule. MEDIASTINUM: Left paratracheal adenopathy now measures 3.7 x 2.0 cm increased in 
size. Mass in AP window which extends superiorly into the left infraclavicular 
region and inferiorly into the left hilum is also increase in size. When 
measured in a similar fashion this measures 3.5 x 2.4 previously 3.0 x 2.9 cm. Right parasternal mass is decreased in size measuring 3.4 x 3.4 cm. Subcarinal 
adenopathy measures 2.8 x 2.3 cm increased in size. There is increased size of 
the nodular component adjacent to the xiphoid now measuring 2.6 x 3.3 cm. SWATI: Left hilar lymph node measures 3.2 x 2.4 cm previously 2.5 x 1.8 cm. THORACIC AORTA: No dissection or aneurysm. MAIN PULMONARY ARTERY: Normal in caliber. TRACHEA/BRONCHI: Patent. ESOPHAGUS: No wall thickening or dilatation. HEART: Normal in size. PLEURA: No effusion or pneumothorax. LUNGS: No nodule, mass, or airspace disease. LIVER: Multiple masses are noted and better visualized on current study 
secondary to the addition of intravenous contrast. Comparison lesions measure 
previously, there is a 3.5 x 2.8 cm mass in the right hepatic lobe series 3 
image 58 slightly increased in size. There is a 3.6 x 2.9 cm mass in the left 
hepatic lobe slightly decreased in size series 3 image 56. Left hepatic lobe 
mass series 3 image 45 measuring 4.0 x 2.8 cm slightly increased in size. There 
is extensive tumor infiltration in the liver hilum with narrowing of the main 
portal vein as well as the intrahepatic portal veins. The common hepatic artery 
is not well visualized and likely occluded with some collateral flow noted in 
the liver hilum. GALLBLADDER: Not visualized SPLEEN: Mass in the splenic hilum increased in size measuring 7.4 x 4.3 cm with 
invasion into the splenic hilum. PANCREAS: The pancreas is encased by tumor. ADRENALS: There is likely involvement of the left adrenal gland tumor. KIDNEYS: Right nephroureteral stent with mild to moderate right-sided hydronephrosis. STOMACH: Tumor encasing the gastric antrum. SMALL BOWEL: No dilatation or wall thickening. COLON: Tumor abutting the tumor encasing the rectosigmoid junction. Tumor 
abutting the transverse colon. APPENDIX: Unremarkable. PERITONEUM: Some free fluid noted in the pelvis. Increased size of mesenteric 
masses. Representative examples: 7.8 x 4.3 cm tumor anterior peritoneum series 3 
image 84. No pelvic tumor 7.5 x 4.8 cm series 3 image 100. RETROPERITONEUM:  
Right pelvic sidewall increased in size. Large right lateral lymph node 
measuring 3.1 x 2.0 cm not significantly changed. REPRODUCTIVE ORGANS: The visualized URINARY BLADDER: No mass or calculus. BONES: No destructive bone lesion. ADDITIONAL COMMENTS: N/A 
  
IMPRESSION IMPRESSION: 
1. There is some variable response to therapy with several lesions decreased in 
size. However a majority of the lesions have increased in size consistent with 
progression of disease with involvement of the mediastinum, liver masses, and 
peritoneal carcinomatosis. 2.  Increase right-sided hydronephrosis. Horace Coral SUBJECTIVE 2/3/2021: 
Presents to [facility]. No distress 
-SOB improved with lifestyle modifications. Exercising on stationary bike and w/ exercise videos at her own pace. Chronic SOB with extended activity.  
-Dysuria resolved with abx for UTI. Vidal Velasquez. Hematuria continues. Has appt with urologist Mar 2. She had cystoscopy with stent exchange in October with \"manipulation. \"   
-Right leg pain improved. US improved thrombus. She remains on eliquis for persistent RLE DVT. Swelling/discomfort has subsided. She ambulates at home with rare rollator or cane use if acute right knee pain d/t extensive OA. Goes up/down stairs slowly and unassisted currently. No weakness/light headedness. Recently fell on ice, sore shoulder/knee. No loss of ROM. Improving. -No N/V. Appetite stable. She drinks 4 bottles water/day with gatorade. +BM daily to every other day with miralax. Takes tramadol nightly for generalized discomforts. 
-Sleep much improved with use of CPAP following sleep study findings.  
-She has chronic pedal/hand numbness/tingling due to taxol and DM. Seen recently by her diabetic provider. A1C 6.6. Follows with podiatrist.  
Denies any change in depression, continues medication. Two adult sons at home who cook for her. She is also fosters a 15 y/o boy, previously two boys. The 15 y/o is moving to a new home soon. She is going to focus on herself and health. ROS 10 pt negative except as per above PMH: 
Past Medical History:  
Diagnosis Date  Adverse effect of anesthesia   
 sleep apnea cpap machine useage  Anemia  Arthritis DDD low back and knees  Asthma   
 uses albuterol prn only; none x 6 mos. Never hospitalized  BRCA negative 1/2013  Calculus of kidney  Chronic kidney disease   
 kidney stones  Chronic pain   
 knee pain bilat  CINV (chemotherapy-induced nausea and vomiting) 8/21/2014  Decreased hearing, right PATIENT STATES THIS IS DUE TO CHEMOTHERAPY  Diabetes ([address] Age 46 IDDM  Environmental allergies  Fibromyalgia  GERD (gastroesophageal reflux disease)   
 controlled with med  Hx of pulmonary embolus during pregnancy 1/18/2015  Hydronephrosis due to obstruction of ureter 3/18/2019  Hypertension  Ill-defined condition Sepsis 9-2015 -  SOURCE PORT  
 Ill-defined condition 2016  
 chemotherapy  Mass of chest wall 05/2018  Morbid obesity ([address]  Murmur, heart  Other and unspecified hyperlipidemia  Ovarian cancer ([address] 9/2012, 1/2014  
 serous, high grade, stage IIIc. s/p chemotx (has port)  Psychiatric disorder Depression/Anxiety  Rectal bleeding  Thromboembolus ([address] 1993 POST PARTUM; resolved- PELVIS  Thyroid disease HYPOTHYROID  Unspecified sleep apnea CPAP, Dr. Darron Reed PSH: 
Past Surgical History:  
Procedure Laterality Date   
  X2  
 HX HYSTERECTOMY  2012 ROULA/BSO, tumor debulking, omentectomy for ovarian cancer  HX ORTHOPAEDIC Right   
 ankle-FX, R  
 HX ORTHOPAEDIC Right   
 FX R ARM  
 HX UROLOGICAL Right 2017 STENT FOR KIDNEY STONES  
 HX VASCULAR ACCESS  2015  
 right chest- port placed  HX VASCULAR ACCESS Left 2017 TETE CATH  
 WA BREAST SURGERY PROCEDURE UNLISTED Lymph node in left breast 2017 SOC: 
Social History Socioeconomic History  Marital status:  Spouse name: Not on file  Number of children: Not on file  Years of education: Not on file  Highest education level: Not on file Occupational History  Not on file Social Needs  Financial resource strain: Not on file  Food insecurity Worry: Not on file Inability: Not on file  Transportation needs Medical: Not on file Non-medical: Not on file Tobacco Use  Smoking status: Never Smoker  Smokeless tobacco: Never Used Substance and Sexual Activity  Alcohol use: Yes Comment: 1 drink per month  Drug use: No  
 Sexual activity: Yes Lifestyle  Physical activity Days per week: Not on file Minutes per session: Not on file  Stress: Not on file Relationships  Social connections Talks on phone: Not on file Gets together: Not on file Attends Hinduism service: Not on file Active member of club or organization: Not on file Attends meetings of clubs or organizations: Not on file Relationship status: Not on file  Intimate partner violence Fear of current or ex partner: Not on file Emotionally abused: Not on file Physically abused: Not on file Forced sexual activity: Not on file Other Topics Concern  Not on file Social History Narrative Lives in [facility] alone.  passed away in 5/16 of a heart attack. Has 2 sons. Disability. 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 Enjoys swimming and going to the gym. Family History Family History Problem Relation Age of Onset  Hypertension Mother [facility] Arthritis-osteo Mother  Hypertension Father  Arthritis-osteo Father  Cancer Father PROSTATE  COPD Father  Hypertension Brother  Elevated Lipids Brother  Arthritis-osteo Brother  Hypertension Brother  Elevated Lipids Brother  Obesity Brother  Cancer Brother PROSTATE  Anesth Problems Son DELAYED AWAKENING  
 Diabetes Maternal Grandmother  Anesth Problems Paternal Grandmother PATIENT STATES GRANDMOTHER'S HEART STOPPED DURING SURGERY Medications: (reviewed) Current Outpatient Medications on File Prior to Encounter Medication Sig Dispense Refill  dexAMETHasone (DECADRON) 4 mg tablet TAKE 2 TABLETS WITH BREAKFAST THE DAY BEFORE CHEMO AND FOR 2 DAYS AFTER CHEMO 36 Tab 2  
 amoxicillin (AMOXIL) 500 mg capsule Take 1 Cap by mouth three (3) times daily for 7 days. 21 Cap 0  predniSONE (DELTASONE) 50 mg tablet Take 1 tablet 13 hours prior to scan, take 1 tablet 7 hours prior to scan and take 1 tablet one hour prior to scan 3 Tab 0  
 omega-3 acid ethyl esters (LOVAZA) 1 gram capsule TAKE 1 CAPSULE BY MOUTH TWICE A  Cap 2  
 rosuvastatin (CRESTOR) 10 mg tablet Take 10 mg by mouth nightly.  doxazosin (CARDURA) 1 mg tablet Take  by mouth daily.  doxazosin (CARDURA) 1 mg tablet Take 1 mg by mouth daily.  SULFAMETHOXAZOLE-TRIMETHOPRIM PO Take  by mouth two (2) times a day. 7 days  lidocaine-prilocaine (EMLA) topical cream Apply small amount over port area and cover with band aid one hour before chemo 30 g 3  
  ondansetron (ZOFRAN ODT) 4 mg disintegrating tablet Take 1 Tab by mouth every eight (8) hours as needed for Nausea. Indications: nausea and vomiting caused by cancer drugs 30 Tab 2  
 fluticasone propionate (FLONASE) 50 mcg/actuation nasal spray 2 SPRAYS IN BOTH NOSTRILS DAILY 1 Bottle 0  
 cycloSPORINE (RESTASIS) 0.05 % dpet  albuterol (PROVENTIL VENTOLIN) 2.5 mg /3 mL (0.083 %) nebu Take 2.5 mg by inhalation.  dextroamphetamine-amphetamine (ADDERALL) 20 mg tablet TAKE 1 TABLET BY MOUTH EVERY DAY  0  
 rosuvastatin (CRESTOR) 10 mg tablet TAKE 1 TABLET BY MOUTH EVERY DAY  0  
 telmisartan (MICARDIS) 20 mg tablet TAKE 1 TABLET BY MOUTH EVERY DAY 90 Tab 3  
 atorvastatin (LIPITOR) 20 mg tablet  BD ULTRA-FINE SHORT PEN NEEDLE 31 gauge x 5/16\" ndle USE TO INJECT INSULIN 5 TIMES DAILY 200 Pen Needle 11  
 insulin aspart U-100 (NOVOLOG FLEXPEN U-100 INSULIN) 100 unit/mL (3 mL) inpn INJECT 20 UNITS BEFORE EACH MEAL + 4 UNITS FOR EVERY 50 MG/DL ABOVE 150 MG/DL.  UNITS PER DAY 90 Adjustable Dose Pre-filled Pen Syringe 3  
 LEVEMIR FLEXTOUCH U-100 INSULN 100 unit/mL (3 mL) inpn INJECT 30 UNITS IN AM AND 50 UNITS AT NIGHT. INCREASE AS DIRECTED TO  UNITS/DAY. 90 Adjustable Dose Pre-filled Pen Syringe 3  DEXILANT 60 mg CpDB capsule (delayed release) TAKE 1 CAPSULE BY MOUTH EVERY DAY 90 Cap 3  
 LINZESS 145 mcg cap capsule TAKE 1 CAPSULE BY MOUTH EVERY DAYY 30 Cap 0  
 levothyroxine (SYNTHROID) 150 mcg tablet TAKE 1 TABLET BY MOUTH EVERY DAY BEFORE BREAKFAST 90 Tab 0  
 EPINEPHrine (EPIPEN) 0.3 mg/0.3 mL injection INJECT 0.3 ML BY INTRAMUSCULAR ROUTE ONCE AS NEEDED FOR UP TO 1 DOSE.  1  
 glucose blood VI test strips (TRUE METRIX GLUCOSE TEST STRIP) strip by Does Not Apply route See Admin Instructions. 30 Strip 5  
 lancets misc by Does Not Apply route daily.  True Metrix lancets- test blood sugar once per day 30 Each 5  
  Blood-Glucose Meter monitoring kit Check up to tid, as directed 1 Kit 0  
 azelastine (ASTELIN) 137 mcg (0.1 %) nasal spray 1 [place] by Both Nostrils route daily as needed. Use in each nostril as directed  insulin detemir U-100 (LEVEMIR) 100 unit/mL injection 20 Units by SubCUTAneous route daily (with lunch).  montelukast (SINGULAIR) 10 mg tablet Take 10 mg by mouth nightly.  ascorbic acid, vitamin C, (VITAMIN C) 1,000 mg tablet Take 1,000 mg by mouth daily.  albuterol (PROVENTIL VENTOLIN) 2.5 mg /3 mL (0.083 %) nebulizer solution 2.5 mg by Nebulization route every four (4) hours as needed for Wheezing.  Liraglutide (VICTOZA) 0.6 mg/0.1 mL (18 mg/3 mL) pnij 1.8 mg by SubCUTAneous route daily (with lunch). 3 Pen 3  
 SYMBICORT 160-4.5 mcg/actuation HFAA Take 2 Puffs by inhalation two (2) times daily as needed. 2 Inhaler 3  clonazePAM (KLONOPIN) 0.5 mg tablet Take 0.5 mg by mouth nightly as needed.  sertraline (ZOLOFT) 50 mg tablet TAKE 1 TABLET BY MOUTH as needed  1  cholecalciferol, VITAMIN D3, (VITAMIN D3) 5,000 unit tab tablet Take 5,000 Units by mouth daily.  furosemide (LASIX) 40 mg tablet Take 40 mg by mouth every other day. 3 No current facility-administered medications on file prior to encounter. Allergies: (reviewed) Allergies Allergen Reactions  Carboplatin Other (comments) Patient developed shortness of breath, felt faint, coughing, skin flushed and \"itchy\". This occurred on the patients 8th treatment.  Iodinated Contrast Media Rash 13 hr pre-medication prior to IV contrast.  
Patient has done well with 1 hr pre-medication of (Solu-Medrol) 40mg &  (Benadryl) 50mg.  Lipitor [Atorvastatin] Myalgia  Shellfish Containing Products Hives  Tape [Adhesive] Itching and Other (comments) \"op site-- clear,thin tape\"--caused blister  Tomato Hives  Iodine And Iodide Containing Products Rash  Olmesartan Other (comments)  Losartan Other (comments)  
  headaches  Percocet [Oxycodone-Acetaminophen] Other (comments) Fever; however, current home med OBJECTIVE Physical Exam 
Visit Vitals /68 Pulse 74 Temp 97.7 °F (36.5 °C) Resp 18 LMP 09/07/2011 GENERAL BRIAN: Conversant, pleasant, alert, oriented. NAD HEENT: Oropharynx clear. Sclera anicteric. No JVD. No adenopathy CV: RRR  
RESPIRATORY: CTA bilat GASTROINT: soft, non-tender, without masses or organomegaly, no fluid wave. Habitus limits exam. Previous RUQ nodule gone EXTREMITIES: bilat 1+ edema/girth nonpitting. Nontender here. Feet warm, mobile, sensate. ALCON SURVEY: Negative NEURO: Grossly intact, no acute deficit. Oriented. Not depressed. Not agitated. Wt Readings from Last 3 Encounters:  
01/27/21 273 lb (123.8 kg) 01/13/21 273 lb (123.8 kg) 01/06/21 271 lb (122.9 kg) DATE REVIEW as available: 
Lab Results Component Value Date/Time WBC 8.7 02/03/2021 02:20 PM  
 Hemoglobin (POC) 10.5 (L) 03/20/2017 12:15 PM  
 HGB 6.2 (L) 02/03/2021 02:20 PM  
 Hematocrit (POC) 31 (L) 03/20/2017 12:15 PM  
 HCT 19.8 (L) 02/03/2021 02:20 PM  
 PLATELET 568 49/84/4346 02:20 PM  
 MCV 98.0 02/03/2021 02:20 PM  
 
Lab Results Component Value Date/Time  
 ABS. NEUTROPHILS 6.8 02/03/2021 02:20 PM  
 
Lab Results Component Value Date/Time Sodium 140 02/03/2021 02:20 PM  
 Potassium 4.0 02/03/2021 02:20 PM  
 Chloride 111 (H) 02/03/2021 02:20 PM  
 CO2 26 02/03/2021 02:20 PM  
 Anion gap 3 (L) 02/03/2021 02:20 PM  
 Glucose 205 (H) 02/03/2021 02:20 PM  
 BUN 28 (H) 02/03/2021 02:20 PM  
 Creatinine 1.13 (H) 02/03/2021 02:20 PM  
 BUN/Creatinine ratio 25 (H) 02/03/2021 02:20 PM  
 GFR est AA 60 (L) 02/03/2021 02:20 PM  
 GFR est non-AA 49 (L) 02/03/2021 02:20 PM  
 Calcium 9.0 02/03/2021 02:20 PM  
 Bilirubin, total 0.5 02/03/2021 02:20 PM  
 Alk.  phosphatase 53 02/03/2021 02:20 PM  
 Protein, total 6.7 02/03/2021 02:20 PM  
 Albumin 3.1 (L) 02/03/2021 02:20 PM  
 Globulin 3.6 02/03/2021 02:20 PM  
 A-G Ratio 0.9 (L) 02/03/2021 02:20 PM  
 ALT (SGPT) 40 02/03/2021 02:20 PM  
 
 
CA-125 Date Value Ref Range Status 01/27/2021 37 (H) 1.5 - 35.0 U/mL Final  
  Comment:  
  ** Note new reference range and method ** Results may vary depending on . Method used is [employer] 
  
12/29/2020 46 (H) 1.5 - 35.0 U/mL Final  
  Comment:  
  ** Note new reference range and method ** Results may vary depending on . Method used is [employer] 
  
12/03/2020 48 (H) 1.5 - 35.0 U/mL Final  
  Comment:  
  ** Note new reference range and method ** Results may vary depending on . Method used is [employer] 
  
11/05/2020 52 (H) 1.5 - 35.0 U/mL Final  
  Comment:  
  ** Note new reference range and method ** Results may vary depending on . Method used is [employer] 10/08/2020 116 (H) 1.5 - 35.0 U/mL Final  
  Comment:  
  ** Note new reference range and method ** Results may vary depending on . Method used is [employer] ECHO 1/2020 · Normal cavity size and systolic function (ejection fraction normal). Mild concentric hypertrophy. Estimated left ventricular ejection fraction is 55 - 60%. Visually measured ejection fraction. No regional wall motion abnormality noted. · Mildly dilated left atrium. · Mild aortic valve sclerosis. · Trace mitral valve regurgitation is present. IMPRESSION AND PLAN: 
62 y.o. with diffusely metastatic ovarian cancer. Continues on palliative chemo, retrial taxol Labs/chart reviewed 
-C7 Taxol, ongoing, responsive with decreased lesions 
-CKD stage 2-3 - stable 
-Right hydronephrosis/dysuria/hematuria - stent in place. Exchanged October 2020. Recurrent UTIs. May need ppx abx. Ask she d/w her urologist.  
 Noted new mild left hydro. Compensated.  
-Liver mets - asx -Pain d/t disease/neuropathy -tramadol PRN 
-RLE DVT acute/chronic - on NOAC. Improved on recent US 
-Anemia d/t disease/chemo/hematuria. Fe panel normal. Stable, mostly asx. No CAD, signs of End organ dysfunction. Transfuse for overt sx. -ECKERT/debility - Hx Asthma, chronic due to condition, anemia. Advised while ambulating, coordinate work of breathing, remove mask if needed. Recent CT hx atelectasis. No evidence of PE/effusion. IS support discussed. -IDDM2/depression - controlled overall. -CIPN/diabetic polyneuropathy - not in pain, monitor. Discussed home safety. -SHABANA - CPAP compliance encouraged 
-Wt loss, prot smiley malnutrition - supplements/alimentation as best as able. -Psychosocial - she has some fear about the future, currently very pleased with progress. She is well supported, isaac and independence remains very important. Monitor depressive sx mgmt. Discussed home life and plans for the future. 02/03/21 I have spent 30 minutes reviewing previous notes/chart, test results and face to face with the patient discussing the diagnosis and importance of compliance with the treatment plan. Electronically signed Loletta Ormond, PA-C 
2/3/2021

## 2021-02-03 NOTE — PROGRESS NOTES
[facility]: February 3, 2021 Name: Fara Anderson MRN: [id number] : 1960 
 
1425 Ms. Lucille Drew Arrived to [facility] for  C7D8 Taxol ambulatory in stable condition. Assessment was completed, no acute issues at this time, no new complaints voiced. Port accessed with positive blood return. Labs drawn and sent for processing. Chemotherapy Flowsheet 2/3/2021 Cycle C7D8 Date 2/3/2021 Drug / Regimen Taxol Dosage - Time Up - Time Down -  
Pre Hydration -  
Post Hydration -  
Pre Meds -  
Notes given Patient denies SOB, fever, cough, general not feeling well. Patient denies recent exposure to someone who has tested positive for COVID-19. Patient denies having contact with anyone who has a pending COVID test.   
 
Ms. Kelly Erazo vitals were reviewed. Patient Vitals for the past 12 hrs: 
 Temp Pulse Resp BP  
21 1815  81 18 (!) 174/90  
21 1427  74 18 110/68  
21 1310 97.7 °F (36.5 °C)    Lab results were obtained and reviewed. Recent Results (from the past 12 hour(s)) CBC WITH AUTOMATED DIFF Collection Time: 21  2:20 PM  
Result Value Ref Range WBC 8.7 3.6 - 11.0 K/uL  
 RBC 2.02 (L) 3.80 - 5.20 M/uL HGB 6.2 (L) 11.5 - 16.0 g/dL HCT 19.8 (L) 35.0 - 47.0 % MCV 98.0 80.0 - 99.0 FL  
 MCH 30.7 26.0 - 34.0 PG  
 MCHC 31.3 30.0 - 36.5 g/dL RDW 21.3 (H) 11.5 - 14.5 % PLATELET 087 005 - 000 K/uL MPV 9.4 8.9 - 12.9 FL  
 NRBC 0.9 (H) 0  WBC ABSOLUTE NRBC 0.08 (H) 0.00 - 0.01 K/uL NEUTROPHILS 75 32 - 75 % BAND NEUTROPHILS 3 0 - 6 % LYMPHOCYTES 14 12 - 49 % MONOCYTES 3 (L) 5 - 13 % EOSINOPHILS 4 0 - 7 % BASOPHILS 0 0 - 1 % METAMYELOCYTES 1 (H) 0 % IMMATURE GRANULOCYTES 0 %  
 ABS. NEUTROPHILS 6.8 1.8 - 8.0 K/UL  
 ABS. LYMPHOCYTES 1.2 0.8 - 3.5 K/UL  
 ABS. MONOCYTES 0.3 0.0 - 1.0 K/UL  
 ABS. EOSINOPHILS 0.3 0.0 - 0.4 K/UL  
 ABS. BASOPHILS 0.0 0.0 - 0.1 K/UL ABS. IMM. GRANS. 0.0 K/UL  
 DF MANUAL    
 RBC COMMENTS ANISOCYTOSIS 1+ 
    
 RBC COMMENTS MACROCYTOSIS 
1+ METABOLIC PANEL, COMPREHENSIVE Collection Time: 02/03/21  2:20 PM  
Result Value Ref Range Sodium 140 136 - 145 mmol/L Potassium 4.0 3.5 - 5.1 mmol/L Chloride 111 (H) 97 - 108 mmol/L  
 CO2 26 21 - 32 mmol/L Anion gap 3 (L) 5 - 15 mmol/L Glucose 205 (H) 65 - 100 mg/dL BUN 28 (H) 6 - 20 MG/DL Creatinine 1.13 (H) 0.55 - 1.02 MG/DL  
 BUN/Creatinine ratio 25 (H) 12 - 20 GFR est AA 60 (L) >60 ml/min/1.73m2 GFR est non-AA 49 (L) >60 ml/min/1.73m2 Calcium 9.0 8.5 - 10.1 MG/DL Bilirubin, total 0.5 0.2 - 1.0 MG/DL  
 ALT (SGPT) 40 12 - 78 U/L  
 AST (SGOT) 20 15 - 37 U/L Alk. phosphatase 53 45 - 117 U/L Protein, total 6.7 6.4 - 8.2 g/dL Albumin 3.1 (L) 3.5 - 5.0 g/dL Globulin 3.6 2.0 - 4.0 g/dL A-G Ratio 0.9 (L) 1.1 - 2.2 TYPE & SCREEN Collection Time: 02/03/21  2:20 PM  
Result Value Ref Range Crossmatch Expiration 02/06/2021,2359 ABO/Rh(D) O POSITIVE Antibody screen NEG   
 
 
Pre-medications  were administered as ordered and chemotherapy was initiated. Medications Administered 0.9% sodium chloride infusion Admin Date 
02/03/2021 Action New Bag Dose 25 mL/hr Rate 25 mL/hr Route IntraVENous Administered By 
Raymundo Chaidez RN  
  
  
 dexamethasone (DECADRON) 12 mg in 0.9% sodium chloride 50 mL, overfill volume 5 mL IVPB Admin Date 
02/03/2021 Action Given Dose 
12 mg Rate 232 mL/hr Route IntraVENous Administered By 
Raymundo Chaidez RN  
  
  
 diphenhydrAMINE (BENADRYL) injection 50 mg   
 Admin Date 
02/03/2021 Action Given Dose 50 mg Route IntraVENous Administered By 
Raymundo Chaidez RN  
  
  
 famotidine (PF) (PEPCID) 20 mg in 0.9% sodium chloride 10 mL injection Admin Date 
02/03/2021 Action Given Dose 
20 mg Route IntraVENous Administered By 
Raymundo Chaidez RN  
  
  
 heparin (porcine) pf 300-500 Units Admin Date 
02/03/2021 Action Given Dose 
500 Units Route InterCATHeter Administered By 
Cyndee Becerril RN  
  
  
 PACLitaxeL (TAXOL) 197 mg in 0.9% sodium chloride 250 mL, overfill volume 25 mL chemo infusion Admin Date 
02/03/2021 Action New Bag Dose 
197 mg Rate 
307.8 mL/hr Route IntraVENous Administered By 
Cyndee Becerril RN  
  
  
 palonosetron HCl (ALOXI) injection 0.25 mg   
 Admin Date 
02/03/2021 Action Given Dose 0.25 mg Route IntraVENous Administered By 
Cyndee Becerril RN  
  
  
 saline peripheral flush soln 10 mL Admin Date 
02/03/2021 Action Given Dose 
10 mL Route InterCATHeter Administered By 
Cyndee Becerril RN  
  
  
  
 
 
 
1172 Patient tolerated treatment well. Port maintained positive blood return throughout treatment. Portflushed, heparinized and de accessed per protocol. Patient was discharged from [place] [id number]. Future Appointments Date Time Provider Gabriel Phillip 2/10/2021  8:00 AM [address]  
2/16/2021 10:45 AM Darcie Robertson MD [facility] Anatoliy Greene RN February 3, 2021

## 2021-02-10 NOTE — PROGRESS NOTES
[address] [facility], [address] 
P [phone]  F [phone] Office Visit Patient ID: 
Name: Frank Kumari MRN: [id number] : 1960/60 y.o. Visit date: 2/10/2021 LEONID Hart is an established patient 62 y.o.  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 The patient's previous treatment include primary debulking and adjuvant Taxol/carbo with retreatment in  for recurrence. Subsequent lines of chemo as outlined below. She has most recently started on retrial of single agent weekly Taxol in 2020 following progression with chest wall involvement while on PARPi. Most recent EOD imaging in  showed overall response to therapy. She is s/p R ureteral stenting in situ.   
   
 
OncTx History: 
12: MARAH/BSO/Cytoreductive surgery on 12 noted carcinomatosis/omental caking. 10/2012-2013: 6 cycles Carbo/Taxol 2014-2014: 6 Cycles carbo/Taxol 3/2015-5/13/15: 3 cycles Avastin/Doxil until progression 2015-2015 Weekly Topotecan  
2015-3/2016: Weekly Topotecan 2017-2017Rushie Mahoney D1/D8 Q28 days for 6 cycles 2018: CT core needle bx chest wall mass: Metastatic high-grade serous carcinoma (see comment) Positive for malignancy. 2018-2018: Taxol weekly 2019: 10 fx 30cGy palliative RT to sternal lesion 2019 - 2020 [place]. Underwent right ureteral stent placement on 3/18/19. 
2020: CT CAP variable response to therapy with several lesions decreased in size. However a majority of the lesions have increased in size consistent with progression of disease with involvement of the mediastinum, liver masses, and peritoneal carcinomatosis. Increase right-sided hydronephrosis. Increasing . 
2020 - initiated Taxol weekly 10/2020 - RLE DVT on Eliquis 1/2021: CT CAP Response to therapy with significant decrease in size of thoracic lymphadenopathy, liver metastases, abdominal lymphadenopathy, and pelvic lymphadenopathy. Slight increased mild left hydronephrosis of uncertain significance. CT Results (most recent): 
Results from [facility] Encounter encounter on 01/20/21 CT ABD PELV W CONT Narrative INDICATION:   Right ovarian cancer EXAM:  CT chest, abdomen, and pelvis WITH  CONTRAST 
 
COMPARISON:  CT chest abdomen and pelvis 6/24/2020 TECHNIQUE:  Thin collimation axial images were obtained through the chest, 
abdomen, and pelvis with IV contrast administration. Coronal and sagittal 
reconstructions were obtained. Oral contrast was administered. CT dose 
reduction was achieved through use of a standardized protocol tailored for this 
examination and automatic exposure control for dose modulation. FINDINGS: 
 
Chest: 
 
LUNGS: Medial right middle lobe and minimal medial left upper lobe opacities 
favor scarring. Minimal left basilar subsegmental atelectasis versus scarring. No other significant pulmonary abnormality. No suspicious nodule or mass. The 
central airways are patent. LYMPH NODES: Decrease in size of bulky prevascular anterior mediastinal and 
middle mediastinal lymphadenopathy. Prevascular lymph node measures 5.0 x 1.6 cm 
(series 3, image 21), compared to 6.0 x 2.5 cm previously. Middle mediastinal 
lymph node measures 1.7 x 1.67 m (image 28), compared to 2.4 x 2.3 cm. Anterior 
cardiophrenic lymphadenopathy measures smaller measuring 3.7 cm (image 41), 
compared to 4.4 cm. PLEURAL FLUID: No pleural effusion. PERICARDIAL FLUID: No pericardial effusion. THYROID: No thyroid nodule. OTHER: Left chest port extends to the proximal right atrium. Abdomen: LIVER: Decreased bulky left hepatic low-density lesions. A representative lesion 
measures 4.2 x 1.8 cm (image 51), compared to 5.8 x 3.8 cm. A few low-density lesions in the right hepatic lobe are relatively stable with a representative 
lesion measuring 3.5 cm (image 60). No biliary ductal dilatation. GALLBLADDER: Decompressed without calcified stone. SPLEEN: Decreased infiltrative mass at the hilum measured 3.4 cm (image 52), 
compared to 5.8 cm previously. PANCREAS: No mass or ductal dilatation. ADRENALS: Unremarkable. KIDNEYS/URETERS: Symmetric nephrograms with mild bilateral renal cortical 
atrophy and scarring. Stable right ureteral stent with relatively unchanged mild 
to moderate right hydronephrosis. Slight increased mild left hydronephrosis of 
uncertain significance. PERITONEUM: Central mesenteric mass lesions are markedly smaller. A 
representative lesion measures 7.0 x 3.9 cm (image 66), compared to 9.9 x 5.6 
cm. Decrease in size in left peritoneal mass measuring 6.5 cm (image 57), 
compared to 7.4 cm. Retroperitoneal masses smaller with a representative lesion 
measuring 4.7 x 3.5 cm (image 79), compared to 6.5 x 4.0 cm. No significant 
ascites. COLON: No dilatation or wall thickening. APPENDIX: Not visualized SMALL BOWEL: No dilatation or wall thickening. STOMACH: Unremarkable. Pelvis: PELVIS: Decrease in pelvic lymphadenopathy and size of pelvic masses. A left 
external iliac chain lymph node measures 17 mm in short axis (image 107), 
compared to 32 mm previously. A right lower pelvic mass measures 5.3 cm in 
greatest length (image 109), compared to 7.0 cm. No pelvic free fluid. BONES: Stable appearance of the bones with sclerotic lesion in the sternum ADDITIONAL COMMENTS: N/A Impression Response to therapy with significant decrease in size of thoracic 
lymphadenopathy, liver metastases, abdominal lymphadenopathy, and pelvic 
lymphadenopathy. Slight increased mild left hydronephrosis of uncertain significance. CT 6/2020: 
FINDINGS:  
THYROID: No nodule. MEDIASTINUM: Left paratracheal adenopathy now measures 3.7 x 2.0 cm increased in 
size. Mass in AP window which extends superiorly into the left infraclavicular 
region and inferiorly into the left hilum is also increase in size. When 
measured in a similar fashion this measures 3.5 x 2.4 previously 3.0 x 2.9 cm. Right parasternal mass is decreased in size measuring 3.4 x 3.4 cm. Subcarinal 
adenopathy measures 2.8 x 2.3 cm increased in size. There is increased size of 
the nodular component adjacent to the xiphoid now measuring 2.6 x 3.3 cm. SWATI: Left hilar lymph node measures 3.2 x 2.4 cm previously 2.5 x 1.8 cm. THORACIC AORTA: No dissection or aneurysm. MAIN PULMONARY ARTERY: Normal in caliber. TRACHEA/BRONCHI: Patent. ESOPHAGUS: No wall thickening or dilatation. HEART: Normal in size. PLEURA: No effusion or pneumothorax. LUNGS: No nodule, mass, or airspace disease. LIVER: Multiple masses are noted and better visualized on current study 
secondary to the addition of intravenous contrast. Comparison lesions measure 
previously, there is a 3.5 x 2.8 cm mass in the right hepatic lobe series 3 
image 58 slightly increased in size. There is a 3.6 x 2.9 cm mass in the left 
hepatic lobe slightly decreased in size series 3 image 56. Left hepatic lobe 
mass series 3 image 45 measuring 4.0 x 2.8 cm slightly increased in size. There 
is extensive tumor infiltration in the liver hilum with narrowing of the main 
portal vein as well as the intrahepatic portal veins. The common hepatic artery 
is not well visualized and likely occluded with some collateral flow noted in 
the liver hilum. GALLBLADDER: Not visualized SPLEEN: Mass in the splenic hilum increased in size measuring 7.4 x 4.3 cm with 
invasion into the splenic hilum. PANCREAS: The pancreas is encased by tumor. ADRENALS: There is likely involvement of the left adrenal gland tumor. KIDNEYS: Right nephroureteral stent with mild to moderate right-sided hydronephrosis. STOMACH: Tumor encasing the gastric antrum. SMALL BOWEL: No dilatation or wall thickening. COLON: Tumor abutting the tumor encasing the rectosigmoid junction. Tumor 
abutting the transverse colon. APPENDIX: Unremarkable. PERITONEUM: Some free fluid noted in the pelvis. Increased size of mesenteric 
masses. Representative examples: 7.8 x 4.3 cm tumor anterior peritoneum series 3 
image 84. No pelvic tumor 7.5 x 4.8 cm series 3 image 100. RETROPERITONEUM:  
Right pelvic sidewall increased in size. Large right lateral lymph node 
measuring 3.1 x 2.0 cm not significantly changed. REPRODUCTIVE ORGANS: The visualized URINARY BLADDER: No mass or calculus. BONES: No destructive bone lesion. ADDITIONAL COMMENTS: N/A 
  
IMPRESSION IMPRESSION: 
1. There is some variable response to therapy with several lesions decreased in 
size. However a majority of the lesions have increased in size consistent with 
progression of disease with involvement of the mediastinum, liver masses, and 
peritoneal carcinomatosis. 2.  Increase right-sided hydronephrosis. Gerald Sorensenbryon SUBJECTIVE 2/10/2021: 
Presents to [facility]. No distress 
-SOB improved with lifestyle modifications. Exercising on stationary bike and w/ exercise videos at her own pace. Chronic SOB with extended activity.  
-Dysuria resolved with abx for UTI. Rosalinda Avers. Hematuria resolved over one week. Has appt with urologist Mar 2. Last cystoscopy with stent exchange in October with \"manipulation. \"   
-Right leg pain improved. US improved thrombus. She remains on eliquis for persistent RLE DVT. Swelling/discomfort has subsided. Hx right knee OA with right hip pain. She ambulates at home with rare rollator or cane use if acute right knee pain d/t extensive OA. Goes up/down stairs slowly and unassisted currently. No weakness/light headedness. Recently fell on ice, sore shoulder/knee. No loss of ROM. Improving. -No N/V. Appetite stable, noted wt gain. She drinks 4 bottles water/day with gatorade. +BM daily to every other day with miralax.  Takes tramadol nightly for generalized discomforts. 
-Sleep much improved with use of CPAP following sleep study findings.  
-She has chronic pedal/hand numbness/tingling due to taxol and DM, PM pain. Seen recently by her diabetic provider, A1C 6.2.  Follows with podiatrist.  
Denies any change in depression, continues medication. Recently lost close friend.  
Two adult sons at home who cook for her.  She is also fosters a 14 y/o boy, previously two boys. The 14 y/o is moving to a new home soon.  She is going to focus on herself and health. 
 
 
 
ROS 
10 pt negative except as per above 
 
PMH: 
Past Medical History:  
Diagnosis Date  
• Adverse effect of anesthesia   
 sleep apnea cpap machine useage   
• Anemia   
• Arthritis   
 DDD low back and knees  
• Asthma   
 uses albuterol prn only; none x 6 mos.  Never hospitalized  
• BRCA negative 1/2013  
• Calculus of kidney   
• Chronic kidney disease   
 kidney stones  
• Chronic pain   
 knee pain bilat  
• CINV (chemotherapy-induced nausea and vomiting) 8/21/2014  
• Decreased hearing, right   
 PATIENT STATES THIS IS DUE TO CHEMOTHERAPY  
• Diabetes (HCC) Age 39  
 IDDM  
• Environmental allergies   
• Fibromyalgia   
• GERD (gastroesophageal reflux disease)   
 controlled with med  
• Hx of pulmonary embolus during pregnancy 1/18/2015  
• Hydronephrosis due to obstruction of ureter 3/18/2019  
• Hypertension   
• Ill-defined condition   
 Sepsis 9-2015 -  SOURCE PORT  
• Ill-defined condition 2016  
 chemotherapy   
• Mass of chest wall 05/2018  
• Morbid obesity (HCC)   
• Murmur, heart   
• Other and unspecified hyperlipidemia   
• Ovarian cancer (HCC) 9/2012, 1/2014  
 serous, high grade, stage IIIc. s/p chemotx (has port)  
• Psychiatric disorder   
 Depression/Anxiety  
• Rectal bleeding   
• Thromboembolus (HCC) 1993  
 POST PARTUM; resolved- PELVIS  Thyroid disease HYPOTHYROID  Unspecified sleep apnea CPAP, Dr. Rhonda Lazcano PSH: 
Past Surgical History:  
Procedure Laterality Date [place]  
  X2  
 HX HYSTERECTOMY  2012 ROULA/BSO, tumor debulking, omentectomy for ovarian cancer  HX ORTHOPAEDIC Right   
 ankle-FX, R  
 HX ORTHOPAEDIC Right   
 FX R ARM  
 HX UROLOGICAL Right 2017 STENT FOR KIDNEY STONES  
 HX VASCULAR ACCESS  2015  
 right chest- port placed  HX VASCULAR ACCESS Left 2017 TETE CATH  
 ME BREAST SURGERY PROCEDURE UNLISTED Lymph node in left breast 2017 SOC: 
Social History Socioeconomic History  Marital status:  Spouse name: Not on file  Number of children: Not on file  Years of education: Not on file  Highest education level: Not on file Occupational History  Not on file Social Needs  Financial resource strain: Not on file  Food insecurity Worry: Not on file Inability: Not on file  Transportation needs Medical: Not on file Non-medical: Not on file Tobacco Use  Smoking status: Never Smoker  Smokeless tobacco: Never Used Substance and Sexual Activity  Alcohol use: Yes Comment: 1 drink per month  Drug use: No  
 Sexual activity: Yes Lifestyle  Physical activity Days per week: Not on file Minutes per session: Not on file  Stress: Not on file Relationships  Social connections Talks on phone: Not on file Gets together: Not on file Attends [employer] service: Not on file Active member of club or organization: Not on file Attends meetings of clubs or organizations: Not on file Relationship status: Not on file  Intimate partner violence Fear of current or ex partner: Not on file Emotionally abused: Not on file Physically abused: Not on file Forced sexual activity: Not on file Other Topics Concern  Not on file Social History Narrative Lives in [facility] alone.  passed away in 5/16 of a heart attack. Has 2 sons. Disability. 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 Enjoys swimming and going to the gym. Family History Family History Problem Relation Age of Onset  Hypertension Mother [facility] Arthritis-osteo Mother  Hypertension Father  Arthritis-osteo Father  Cancer Father PROSTATE  COPD Father  Hypertension Brother  Elevated Lipids Brother  Arthritis-osteo Brother  Hypertension Brother  Elevated Lipids Brother  Obesity Brother  Cancer Brother PROSTATE  Anesth Problems Son DELAYED AWAKENING  
 Diabetes Maternal Grandmother  Anesth Problems Paternal Grandmother PATIENT STATES GRANDMOTHER'S HEART STOPPED DURING SURGERY Medications: (reviewed) Current Outpatient Medications on File Prior to Encounter Medication Sig Dispense Refill  dexAMETHasone (DECADRON) 4 mg tablet TAKE 2 TABLETS WITH BREAKFAST THE DAY BEFORE CHEMO AND FOR 2 DAYS AFTER CHEMO 36 Tab 2  predniSONE (DELTASONE) 50 mg tablet Take 1 tablet 13 hours prior to scan, take 1 tablet 7 hours prior to scan and take 1 tablet one hour prior to scan 3 Tab 0  
 omega-3 acid ethyl esters (LOVAZA) 1 gram capsule TAKE 1 CAPSULE BY MOUTH TWICE A  Cap 2  
 rosuvastatin (CRESTOR) 10 mg tablet Take 10 mg by mouth nightly.  doxazosin (CARDURA) 1 mg tablet Take  by mouth daily.  doxazosin (CARDURA) 1 mg tablet Take 1 mg by mouth daily.  SULFAMETHOXAZOLE-TRIMETHOPRIM PO Take  by mouth two (2) times a day. 7 days  lidocaine-prilocaine (EMLA) topical cream Apply small amount over port area and cover with band aid one hour before chemo 30 g 3  
  ondansetron (ZOFRAN ODT) 4 mg disintegrating tablet Take 1 Tab by mouth every eight (8) hours as needed for Nausea. Indications: nausea and vomiting caused by cancer drugs 30 Tab 2  
 fluticasone propionate (FLONASE) 50 mcg/actuation nasal spray 2 SPRAYS IN BOTH NOSTRILS DAILY 1 Bottle 0  
 cycloSPORINE (RESTASIS) 0.05 % dpet  albuterol (PROVENTIL VENTOLIN) 2.5 mg /3 mL (0.083 %) nebu Take 2.5 mg by inhalation.  dextroamphetamine-amphetamine (ADDERALL) 20 mg tablet TAKE 1 TABLET BY MOUTH EVERY DAY  0  
 rosuvastatin (CRESTOR) 10 mg tablet TAKE 1 TABLET BY MOUTH EVERY DAY  0  
 telmisartan (MICARDIS) 20 mg tablet TAKE 1 TABLET BY MOUTH EVERY DAY 90 Tab 3  
 atorvastatin (LIPITOR) 20 mg tablet  BD ULTRA-FINE SHORT PEN NEEDLE 31 gauge x 5/16\" ndle USE TO INJECT INSULIN 5 TIMES DAILY 200 Pen Needle 11  
 insulin aspart U-100 (NOVOLOG FLEXPEN U-100 INSULIN) 100 unit/mL (3 mL) inpn INJECT 20 UNITS BEFORE EACH MEAL + 4 UNITS FOR EVERY 50 MG/DL ABOVE 150 MG/DL.  UNITS PER DAY 90 Adjustable Dose Pre-filled Pen Syringe 3  
 LEVEMIR FLEXTOUCH U-100 INSULN 100 unit/mL (3 mL) inpn INJECT 30 UNITS IN AM AND 50 UNITS AT NIGHT. INCREASE AS DIRECTED TO  UNITS/DAY. 90 Adjustable Dose Pre-filled Pen Syringe 3  DEXILANT 60 mg CpDB capsule (delayed release) TAKE 1 CAPSULE BY MOUTH EVERY DAY 90 Cap 3  
 LINZESS 145 mcg cap capsule TAKE 1 CAPSULE BY MOUTH EVERY DAYY 30 Cap 0  
 levothyroxine (SYNTHROID) 150 mcg tablet TAKE 1 TABLET BY MOUTH EVERY DAY BEFORE BREAKFAST 90 Tab 0  
 EPINEPHrine (EPIPEN) 0.3 mg/0.3 mL injection INJECT 0.3 ML BY INTRAMUSCULAR ROUTE ONCE AS NEEDED FOR UP TO 1 DOSE.  1  
 glucose blood VI test strips (TRUE METRIX GLUCOSE TEST STRIP) strip by Does Not Apply route See Admin Instructions. 30 Strip 5  
 lancets misc by Does Not Apply route daily.  True Metrix lancets- test blood sugar once per day 30 Each 5  
  Blood-Glucose Meter monitoring kit Check up to tid, as directed 1 Kit 0  
 azelastine (ASTELIN) 137 mcg (0.1 %) nasal spray 1 [place] by Both Nostrils route daily as needed. Use in each nostril as directed  insulin detemir U-100 (LEVEMIR) 100 unit/mL injection 20 Units by SubCUTAneous route daily (with lunch).  montelukast (SINGULAIR) 10 mg tablet Take 10 mg by mouth nightly.  ascorbic acid, vitamin C, (VITAMIN C) 1,000 mg tablet Take 1,000 mg by mouth daily.  albuterol (PROVENTIL VENTOLIN) 2.5 mg /3 mL (0.083 %) nebulizer solution 2.5 mg by Nebulization route every four (4) hours as needed for Wheezing.  Liraglutide (VICTOZA) 0.6 mg/0.1 mL (18 mg/3 mL) pnij 1.8 mg by SubCUTAneous route daily (with lunch). 3 Pen 3  
 SYMBICORT 160-4.5 mcg/actuation HFAA Take 2 Puffs by inhalation two (2) times daily as needed. 2 Inhaler 3  clonazePAM (KLONOPIN) 0.5 mg tablet Take 0.5 mg by mouth nightly as needed.  sertraline (ZOLOFT) 50 mg tablet TAKE 1 TABLET BY MOUTH as needed  1  cholecalciferol, VITAMIN D3, (VITAMIN D3) 5,000 unit tab tablet Take 5,000 Units by mouth daily.  furosemide (LASIX) 40 mg tablet Take 40 mg by mouth every other day. 3 No current facility-administered medications on file prior to encounter. Allergies: (reviewed) Allergies Allergen Reactions  Carboplatin Other (comments) Patient developed shortness of breath, felt faint, coughing, skin flushed and \"itchy\". This occurred on the patients 8th treatment.  Iodinated Contrast Media Rash 13 hr pre-medication prior to IV contrast.  
Patient has done well with 1 hr pre-medication of (Solu-Medrol) 40mg &  (Benadryl) 50mg.  Lipitor [Atorvastatin] Myalgia  Shellfish Containing Products Hives  Tape [Adhesive] Itching and Other (comments) \"op site-- clear,thin tape\"--caused blister  Tomato Hives  Iodine And Iodide Containing Products Rash  Olmesartan Other (comments)  Losartan Other (comments)  
  headaches  Percocet [Oxycodone-Acetaminophen] Other (comments) Fever; however, current home med OBJECTIVE Physical Exam 
Visit Vitals BP (!) 162/84 (BP 1 Location: Right upper arm, BP Patient Position: Sitting) Pulse 84 Temp 96.8 °F (36 °C) Resp 18 Ht 5' 8\" (1.727 m) Wt 278 lb 12.8 oz (126.5 kg) LMP 09/07/2011 Breastfeeding No  
BMI 42.39 kg/m² GENERAL BRIAN: Conversant, pleasant, alert, oriented. NAD HEENT: Oropharynx clear. Sclera anicteric. No JVD. No adenopathy CV: RRR  
RESPIRATORY: CTA bilat GASTROINT: soft, non-tender, without masses or organomegaly, no fluid wave. Habitus limits exam. Previous RUQ nodule gone EXTREMITIES: bilat 1+ edema/girth nonpitting. Nontender here. Feet warm, mobile, sensate. ALCON SURVEY: Negative NEURO: Grossly intact, no acute deficit. Oriented. Not depressed. Not agitated. Wt Readings from Last 3 Encounters:  
02/10/21 278 lb 12.8 oz (126.5 kg) 02/03/21 282 lb (127.9 kg) 01/27/21 273 lb (123.8 kg) DATE REVIEW as available: 
Lab Results Component Value Date/Time WBC 3.2 (L) 02/10/2021 08:26 AM  
 Hemoglobin (POC) 10.5 (L) 03/20/2017 12:15 PM  
 HGB 6.4 (L) 02/10/2021 08:26 AM  
 Hematocrit (POC) 31 (L) 03/20/2017 12:15 PM  
 HCT 21.0 (L) 02/10/2021 08:26 AM  
 PLATELET 425 59/80/0141 08:26 AM  
 MCV 97.2 02/10/2021 08:26 AM  
 
Lab Results Component Value Date/Time  
 ABS. NEUTROPHILS 1.7 (L) 02/10/2021 08:26 AM  
 
Lab Results Component Value Date/Time  Sodium 141 02/10/2021 08:26 AM  
 Potassium 3.9 02/10/2021 08:26 AM  
 Chloride 113 (H) 02/10/2021 08:26 AM  
 CO2 23 02/10/2021 08:26 AM  
 Anion gap 5 02/10/2021 08:26 AM  
 Glucose 211 (H) 02/10/2021 08:26 AM  
 BUN 29 (H) 02/10/2021 08:26 AM  
 Creatinine 1.37 (H) 02/10/2021 08:26 AM  
 BUN/Creatinine ratio 21 (H) 02/10/2021 08:26 AM  
 GFR est AA 48 (L) 02/10/2021 08:26 AM  
 GFR est non-AA 39 (L) 02/10/2021 08:26 AM  
  Calcium 9.3 02/10/2021 08:26 AM  
 Bilirubin, total 0.5 02/10/2021 08:26 AM  
 Alk. phosphatase 64 02/10/2021 08:26 AM  
 Protein, total 7.4 02/10/2021 08:26 AM  
 Albumin 3.4 (L) 02/10/2021 08:26 AM  
 Globulin 4.0 02/10/2021 08:26 AM  
 A-G Ratio 0.9 (L) 02/10/2021 08:26 AM  
 ALT (SGPT) 47 02/10/2021 08:26 AM  
 
 
CA-125  
Date Value Ref Range Status  
01/27/2021 37 (H) 1.5 - 35.0 U/mL Final  
  Comment:  
  ** Note new reference range and method ** 
Results may vary depending on . 
Method used is Siemens Dimension Vista 
  
12/29/2020 46 (H) 1.5 - 35.0 U/mL Final  
  Comment:  
  ** Note new reference range and method ** 
Results may vary depending on . 
Method used is Siemens Dimension Vista 
  
12/03/2020 48 (H) 1.5 - 35.0 U/mL Final  
  Comment:  
  ** Note new reference range and method ** 
Results may vary depending on . 
Method used is Siemens Dimension Vista 
  
11/05/2020 52 (H) 1.5 - 35.0 U/mL Final  
  Comment:  
  ** Note new reference range and method ** 
Results may vary depending on . 
Method used is Siemens Dimension Vista 
  
10/08/2020 116 (H) 1.5 - 35.0 U/mL Final  
  Comment:  
  ** Note new reference range and method ** 
Results may vary depending on . 
Method used is Siemens Dimension [place] 
  
 
 
 
ECHO 1/2020 
· Normal cavity size and systolic function (ejection fraction normal). Mild concentric hypertrophy. Estimated left ventricular ejection fraction is 55 - 60%. Visually measured ejection fraction. No regional wall motion abnormality noted. 
· Mildly dilated left atrium. 
· Mild aortic valve sclerosis. 
· Trace mitral valve regurgitation is present. 
 
 
IMPRESSION AND PLAN: 
60 y.o. with diffusely metastatic ovarian cancer. Continues on palliative chemo, retrial taxol 
 
Labs/chart reviewed 
-C7 Taxol, ongoing, responsive with decreased lesions 
-leuko/neutropenia d/t taxol. Infection/COVID precautions 
 -CKD stage 2-3 - stable. Follows with nephrology. 
-Right hydronephrosis/dysuria/hematuria - stent in place. Exchanged October 2020. Recurrent UTIs. May need ppx abx. Ask she d/w her urologist.  
 Noted new mild left hydro. Compensated.  
-Liver mets - asx 
-RLE DVT acute/chronic - on NOAC. Improved on recent US 
-Anemia d/t disease/chemo/hematuria. Fe panel normal. Stable, mostly asx. No CAD, signs of End organ dysfunction. Transfuse for overt sx. -ECKERT/debility - Hx Asthma, chronic due to condition, anemia. Advised while ambulating, coordinate work of breathing, remove mask if needed. Recent CT hx atelectasis. No evidence of PE/effusion. IS support discussed. -HTN - states routinely normal at home. Continue tracking, advise f/u with PCP. 
-IDDM2/depression - controlled overall. -CIPN/diabetic polyneuropathy - Neurontin 
-Right knee OA/right hip pain - PT consult 
-SHABANA - CPAP compliance encouraged 
-Wt loss, prot smiley malnutrition - discussed controlled, simple wt loss for functional/pain improvement. Supplements/alimentation as best as able. -Psychosocial - she has some fear about the future, currently very pleased with progress. She is well supported, isaac and independence remains very important. Monitor depressive sx mgmt. Discussed home life and plans for the future. Electronically signed Stephen Winters PA-C 
2/10/2021

## 2021-02-10 NOTE — PROGRESS NOTES
[facility] Progress Note Date: February 10, 2021 Name: Olivia Lynn MRN: [id number] : 1960 Ms. Jaciel Clark Arrived ambulatory and in no distress for cycle 7 day 15 of Taxol regimen. Assessment was completed, no acute issues at this time, no new complaints voiced. Patient continues to report that she has peripheral neuropathy in bilateral feet. She states that she has increased her iron intake over the past week and states she is feeling well today. Reports fatigue has been better this past week. No other changes noted at this time. Port accessed without difficulty, labs drawn and processed. Port capped while waiting for lab results. Prior to treatment, patient was screened for COVID. Patient denies any signs or symptoms of COVID. Denies any known physical contact with anyone exposed to, diagnosed with or with pending or positive COVID test. Denies any pending or positive COVID test herself. Chemotherapy Flowsheet 2/10/2021 Cycle C7D15 Date 2/10/2021 Drug / Regimen Taxol Dosage 197mg Time Up 1140 Time Down 1240 Pre Hydration none Post Hydration none Pre Meds given Notes given Ms. Moore's vitals were reviewed. Patient Vitals for the past 12 hrs: 
 Temp Pulse Resp BP  
02/10/21 1249 97.2 °F (36.2 °C) 82 18 (!) 164/87  
02/10/21 0834 96.8 °F (36 °C) 84 18 (!) 162/84 Lab results were obtained and reviewed. Recent Results (from the past 12 hour(s)) CBC WITH AUTOMATED DIFF Collection Time: 02/10/21  8:26 AM  
Result Value Ref Range WBC 3.2 (L) 3.6 - 11.0 K/uL  
 RBC 2.16 (L) 3.80 - 5.20 M/uL HGB 6.4 (L) 11.5 - 16.0 g/dL HCT 21.0 (L) 35.0 - 47.0 % MCV 97.2 80.0 - 99.0 FL  
 MCH 29.6 26.0 - 34.0 PG  
 MCHC 30.5 30.0 - 36.5 g/dL RDW 21.6 (H) 11.5 - 14.5 % PLATELET 002 676 - 814 K/uL MPV 9.7 8.9 - 12.9 FL  
 NRBC 1.9 (H) 0  WBC ABSOLUTE NRBC 0.06 (H) 0.00 - 0.01 K/uL NEUTROPHILS 51 32 - 75 % LYMPHOCYTES 25 12 - 49 % MONOCYTES 15 (H) 5 - 13 % EOSINOPHILS 7 0 - 7 % BASOPHILS 1 0 - 1 % IMMATURE GRANULOCYTES 1 (H) 0.0 - 0.5 % ABS. NEUTROPHILS 1.7 (L) 1.8 - 8.0 K/UL  
 ABS. LYMPHOCYTES 0.8 0.8 - 3.5 K/UL  
 ABS. MONOCYTES 0.5 0.0 - 1.0 K/UL  
 ABS. EOSINOPHILS 0.2 0.0 - 0.4 K/UL  
 ABS. BASOPHILS 0.0 0.0 - 0.1 K/UL  
 ABS. IMM. GRANS. 0.0 0.00 - 0.04 K/UL  
 DF SMEAR SCANNED    
 RBC COMMENTS ANISOCYTOSIS 2+ 
    
 RBC COMMENTS MACROCYTOSIS 1+ 
    
 RBC COMMENTS OVALOCYTES PRESENT 
    
 RBC COMMENTS TEARDROP CELLS 
PRESENT 
    
METABOLIC PANEL, COMPREHENSIVE Collection Time: 02/10/21  8:26 AM  
Result Value Ref Range Sodium 141 136 - 145 mmol/L Potassium 3.9 3.5 - 5.1 mmol/L Chloride 113 (H) 97 - 108 mmol/L  
 CO2 23 21 - 32 mmol/L Anion gap 5 5 - 15 mmol/L Glucose 211 (H) 65 - 100 mg/dL BUN 29 (H) 6 - 20 MG/DL Creatinine 1.37 (H) 0.55 - 1.02 MG/DL  
 BUN/Creatinine ratio 21 (H) 12 - 20 GFR est AA 48 (L) >60 ml/min/1.73m2 GFR est non-AA 39 (L) >60 ml/min/1.73m2 Calcium 9.3 8.5 - 10.1 MG/DL Bilirubin, total 0.5 0.2 - 1.0 MG/DL  
 ALT (SGPT) 47 12 - 78 U/L  
 AST (SGOT) 19 15 - 37 U/L Alk. phosphatase 64 45 - 117 U/L Protein, total 7.4 6.4 - 8.2 g/dL Albumin 3.4 (L) 3.5 - 5.0 g/dL Globulin 4.0 2.0 - 4.0 g/dL A-G Ratio 0.9 (L) 1.1 - 2.2 Medications Administered 0.9% sodium chloride infusion Admin Date 
02/10/2021 Action New Bag Dose 25 mL/hr Rate 25 mL/hr Route IntraVENous Administered By 
Ezekiel Hernandez  
  
  
 0.9% sodium chloride injection 10 mL Admin Date 
02/10/2021 Action Given Dose 
10 mL Route IntraVENous Administered By 
Ezekiel Hernandez  
  
  
 dexamethasone (DECADRON) 12 mg in 0.9% sodium chloride 50 mL, overfill volume 5 mL IVPB Admin Date 
02/10/2021 Action Given Dose 
12 mg Rate 232 mL/hr Route IntraVENous Administered By 
Ezekiel Hernandez  
  
  
 diphenhydrAMINE (BENADRYL) injection 50 mg   
 Admin Date 02/10/2021 Action Given Dose 50 mg Route IntraVENous Administered By 
Justyn Dac  
  
  
 famotidine (PF) (PEPCID) 20 mg in 0.9% sodium chloride 10 mL injection Admin Date 
02/10/2021 Action Given Dose 
20 mg Route IntraVENous Administered By 
Justyn Dach  
  
  
 heparin (porcine) pf 300-500 Units Admin Date 
02/10/2021 Action Given Dose 
500 Units Route InterCATHeter Administered By 
Justyn Dac PACLitaxeL (TAXOL) 197 mg in 0.9% sodium chloride 250 mL, overfill volume 25 mL chemo infusion Admin Date 
02/10/2021 Action New Bag Dose 
197 mg Rate 
307.8 mL/hr Route IntraVENous Administered By 
Yosemite Dac  
  
  
 palonosetron HCl (ALOXI) injection 0.25 mg   
 Admin Date 
02/10/2021 Action Given Dose 0.25 mg Route IntraVENous Administered By 
Justyn Dac  
  
  
 saline peripheral flush soln 10 mL Admin Date 
02/10/2021 Action Given Dose 
10 mL Route InterCATHeter Administered By 
Yosemite Dac Ms. Ted Hoff tolerated treatment well, port flushed and de accessed, patient was discharged from [place] [id number] in stable condition at 1250. Future Appointments Date Time Provider [place] 2/16/2021 10:45 AM MD ALMA BiswasO BS AMB  
2/24/2021  1:30 PM [address] [place] [address] February 10, 2021
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
## 2021-02-15 NOTE — PROGRESS NOTES
[address] [facility], [address] [place], [address] [place] [address] 
P [phone]  F [phone] Office Visit Patient ID: 
Name: Higinio Griffin MRN: [id number] : 1960/60 y.o. Visit date: 2/15/2021 LEONID Escobar is a 62 y.o.  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 The patient's previous treatment procedures include primary Taxol/carbo with retreatment in  and Doxil/Avastin, topotecan, gemzar and now single agent weekly Taxol initiated 18 following progression with chest wall involvement. In 2018, patient did not follow up and self-discontinued her treatment regimen. She presented to the ER on 18 with SOB. Negative workup for PE or MI. Admitted to Hospitalist service from -18. Managed for acute asthma exacerbation, acute FELICIA, and hyperkalemia. The patient was initiated on Oneta Lease on 19. Underwent repeat CT C/A/P on 19 for purposes of following response to treatment. Patient was tolerating Oneta Lease well for the first month, but then had a rise in her creatinine. However, she was also found to have hydronephrosis. Underwent right ureteral stent placement on 3/18/19. Restarted Zejula afterwards, although with continued rise in creatinine to >2. Held Oneta Lease again and restarted Zejula at 100mg/daily on 19. Completed 2700 cGy of radiation to her chest wall mass on 19. Held Oneta Lease one week in 10/2019 for blood transfusion secondary to anemia. Normal iron and B12 labs. CT C/A/P on 10/2/19 with improvement in her disease. CT C/A/P 2020 demonstrates progression. Rising Ca-125. On 2020 initiated on weekly paclitaxel. Presents today for consideration of cycle 8. CT A/P 2021 with significant improvement in her disease. She is takign iron supplementation and reports feeling well.  She is on Eliquis for a right lower extremity DVT. She has tolerated treatment well thus far, and denies complaints except for pain and swelling in her right leg. She denies pain in her chest wall or abdomen. She does have some constipation on the day of treatment, which is relieved with Miralax. Ureteral stent exchanged at the end of October. Scheduled to see Urology in February. She has not required any antibiotics recently. Denies change in appetite or bowel habits. Denies vaginal bleeding, hematuria, hematochezia, constipation, diarrhea, nausea, vomiting, CP, SOB, fevers, or chills. 
   
OncTx History: 
9/21/12: MARAH/BSO/Cytoreductive surgery on 9/21/12 noted carcinomatosis/omental caking. 10/2012-2/2013: 6 cycles Carbo/Taxol 8/2014-11/2014: 6 Cycles carbo/Taxol 3/2015-5/13/15: 3 cycles Avastin/Doxil until progression 7/2015-8/2015 Weekly Topotecan  
12/2015-3/2016: Weekly Topotecan 2/2017-9/2017Misty Hove D1/D8 Q28 days for 6 cycles 4/2018: CT core needle bx chest wall mass: Metastatic high-grade serous carcinoma (see comment) General Categorization Positive for malignancy. 5/8/2018-Present: Taxol  O2,3,29; S/P  Cycle #4  8/14/2018. Patient discontinued treatment secondary to side effects and fatigue. 1/23/2019 - Initiated Elder Cambric. Held do to rising creatinine. However, she was also found to have hydronephrosis. Underwent right ureteral stent placement on 3/18/19. CT C/A/P 6/24/2020 with mixed response, but overall progression. Rising Ca-125 now 268. 
8/6/2020: Initiated weekly paclitaxel 80mg/m2 days , 8, and 15 of 28-day cycle. CT A/P 1/20/2021 with significant improvement in her disease. Imaging Review:  
CT C/A/P 1/20/2021: 
FINDINGS: 
Chest: 
LUNGS: Medial right middle lobe and minimal medial left upper lobe opacities 
favor scarring. Minimal left basilar subsegmental atelectasis versus scarring. No other significant pulmonary abnormality. No suspicious nodule or mass. The 
central airways are patent.  
LYMPH NODES: Decrease in size of bulky prevascular anterior mediastinal and 
middle mediastinal lymphadenopathy. Prevascular lymph node measures 5.0 x 1.6 cm 
(series 3, image 21), compared to 6.0 x 2.5 cm previously. Middle mediastinal 
lymph node measures 1.7 x 1.67 m (image 28), compared to 2.4 x 2.3 cm. Anterior 
cardiophrenic lymphadenopathy measures smaller measuring 3.7 cm (image 41), 
compared to 4.4 cm. PLEURAL FLUID: No pleural effusion. PERICARDIAL FLUID: No pericardial effusion. THYROID: No thyroid nodule. OTHER: Left chest port extends to the proximal right atrium. Abdomen: LIVER: Decreased bulky left hepatic low-density lesions. A representative lesion 
measures 4.2 x 1.8 cm (image 51), compared to 5.8 x 3.8 cm. A few low-density 
lesions in the right hepatic lobe are relatively stable with a representative 
lesion measuring 3.5 cm (image 60). No biliary ductal dilatation. GALLBLADDER: Decompressed without calcified stone. SPLEEN: Decreased infiltrative mass at the hilum measured 3.4 cm (image 52), 
compared to 5.8 cm previously. PANCREAS: No mass or ductal dilatation. ADRENALS: Unremarkable. KIDNEYS/URETERS: Symmetric nephrograms with mild bilateral renal cortical 
atrophy and scarring. Stable right ureteral stent with relatively unchanged mild 
to moderate right hydronephrosis. Slight increased mild left hydronephrosis of 
uncertain significance. PERITONEUM: Central mesenteric mass lesions are markedly smaller. A 
representative lesion measures 7.0 x 3.9 cm (image 66), compared to 9.9 x 5.6 
cm. Decrease in size in left peritoneal mass measuring 6.5 cm (image 57), 
compared to 7.4 cm. Retroperitoneal masses smaller with a representative lesion 
measuring 4.7 x 3.5 cm (image 79), compared to 6.5 x 4.0 cm. No significant 
ascites. COLON: No dilatation or wall thickening. APPENDIX: Not visualized SMALL BOWEL: No dilatation or wall thickening. STOMACH: Unremarkable. Pelvis: PELVIS: Decrease in pelvic lymphadenopathy and size of pelvic masses. A left 
external iliac chain lymph node measures 17 mm in short axis (image 107), 
compared to 32 mm previously. A right lower pelvic mass measures 5.3 cm in 
greatest length (image 109), compared to 7.0 cm. No pelvic free fluid. BONES: Stable appearance of the bones with sclerotic lesion in the sternum ADDITIONAL COMMENTS: N/A IMPRESSION Response to therapy with significant decrease in size of thoracic 
lymphadenopathy, liver metastases, abdominal lymphadenopathy, and pelvic 
lymphadenopathy. Slight increased mild left hydronephrosis of uncertain significance. ROS Constitutional: no weight loss, fever, night sweats Respiratory: no cough, shortness of breath, or wheezing Cardiovascular: no chest pain or dyspnea on exertion. Reports chest wall mass smaller, no associated pain. Heme: No abnormal bleeding Gastrointestinal: no abdominal pain, change in bowel habits, or black or bloody stools Genito-Urinary: no dysuria, trouble voiding, or hematuria Musculoskeletal: + swelling in ankle - bilateral, mostly only at end of day Neurological: mild neuropathy Derm: pigmentation/induration medial left leg. Prior injury from fall. Psych: positive for depression - controlled PMH: 
Past Medical History:  
Diagnosis Date  Adverse effect of anesthesia   
 sleep apnea cpap machine useage  Anemia  Arthritis DDD low back and knees  Asthma   
 uses albuterol prn only; none x 6 mos. Never hospitalized  BRCA negative 1/2013  Calculus of kidney  Chronic kidney disease   
 kidney stones  Chronic pain   
 knee pain bilat  CINV (chemotherapy-induced nausea and vomiting) 8/21/2014  Decreased hearing, right PATIENT STATES THIS IS DUE TO CHEMOTHERAPY  Diabetes ([facility] [address] Age 46 IDDM  Environmental allergies  Fibromyalgia  GERD (gastroesophageal reflux disease)   
 controlled with med  Hx of pulmonary embolus during pregnancy 2015  Hydronephrosis due to obstruction of ureter 3/18/2019  Hypertension  Ill-defined condition Sepsis  -  SOURCE PORT  
 Ill-defined condition 2016  
 chemotherapy  Mass of chest wall 2018  Morbid obesity ([address]  Murmur, heart  Other and unspecified hyperlipidemia  Ovarian cancer ([facility] [address] 2012, 2014  
 serous, high grade, stage IIIc. s/p chemotx (has port)  Psychiatric disorder Depression/Anxiety  Rectal bleeding  Thromboembolus ([address]  POST PARTUM; resolved- PELVIS  Thyroid disease HYPOTHYROID  Unspecified sleep apnea CPAP, Dr. Maria Teresa Best PSH: 
Past Surgical History:  
Procedure Laterality Date [place]  
  X2  
 HX HYSTERECTOMY  2012 ROULA/BSO, tumor debulking, omentectomy for ovarian cancer  HX ORTHOPAEDIC Right   
 ankle-FX, R  
 HX ORTHOPAEDIC Right   
 FX R ARM  
 HX UROLOGICAL Right  STENT FOR KIDNEY STONES  
 HX VASCULAR ACCESS  2015  
 right chest- port placed  HX VASCULAR ACCESS Left 2017 TETE CATH  
 NE BREAST SURGERY PROCEDURE UNLISTED Lymph node in left breast 2017 SOC: 
Social History Socioeconomic History  Marital status:  Spouse name: Not on file  Number of children: Not on file  Years of education: Not on file  Highest education level: Not on file Occupational History  Not on file Social Needs  Financial resource strain: Not on file  Food insecurity Worry: Not on file Inability: Not on file  Transportation needs Medical: Not on file Non-medical: Not on file Tobacco Use  Smoking status: Never Smoker  Smokeless tobacco: Never Used Substance and Sexual Activity  Alcohol use: Yes Comment: 1 drink per month  Drug use: No  
 Sexual activity: Yes Lifestyle  Physical activity Days per week: Not on file Minutes per session: Not on file  Stress: Not on file Relationships  Social connections Talks on phone: Not on file Gets together: Not on file Attends Rastafari service: Not on file Active member of club or organization: Not on file Attends meetings of clubs or organizations: Not on file Relationship status: Not on file  Intimate partner violence Fear of current or ex partner: Not on file Emotionally abused: Not on file Physically abused: Not on file Forced sexual activity: Not on file Other Topics Concern  Not on file Social History Narrative Lives in [facility] alone.  passed away in 5/16 of a heart attack. Has 2 sons. Disability. 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 Enjoys swimming and going to the gym. Family History Family History Problem Relation Age of Onset  Hypertension Mother Cedrick Loser Arthritis-osteo Mother  Hypertension Father  Arthritis-osteo Father  Cancer Father PROSTATE  COPD Father  Hypertension Brother  Elevated Lipids Brother  Arthritis-osteo Brother  Hypertension Brother  Elevated Lipids Brother  Obesity Brother  Cancer Brother PROSTATE  Anesth Problems Son DELAYED AWAKENING  
 Diabetes Maternal Grandmother  Anesth Problems Paternal Grandmother PATIENT STATES GRANDMOTHER'S HEART STOPPED DURING SURGERY Medications: (reviewed) Current Outpatient Medications on File Prior to Visit Medication Sig Dispense Refill  gabapentin (NEURONTIN) 100 mg capsule Take 2 Caps by mouth nightly. Max Daily Amount: 200 mg. Indications: neuropathic pain 60 Cap 0  
 dexAMETHasone (DECADRON) 4 mg tablet TAKE 2 TABLETS WITH BREAKFAST THE DAY BEFORE CHEMO AND FOR 2 DAYS AFTER CHEMO 36 Tab 2  predniSONE (DELTASONE) 50 mg tablet Take 1 tablet 13 hours prior to scan, take 1 tablet 7 hours prior to scan and take 1 tablet one hour prior to scan 3 Tab 0  
 omega-3 acid ethyl esters (LOVAZA) 1 gram capsule TAKE 1 CAPSULE BY MOUTH TWICE A  Cap 2  
 rosuvastatin (CRESTOR) 10 mg tablet Take 10 mg by mouth nightly.  doxazosin (CARDURA) 1 mg tablet Take  by mouth daily.  doxazosin (CARDURA) 1 mg tablet Take 1 mg by mouth daily.  SULFAMETHOXAZOLE-TRIMETHOPRIM PO Take  by mouth two (2) times a day. 7 days  lidocaine-prilocaine (EMLA) topical cream Apply small amount over port area and cover with band aid one hour before chemo 30 g 3  
 ondansetron (ZOFRAN ODT) 4 mg disintegrating tablet Take 1 Tab by mouth every eight (8) hours as needed for Nausea. Indications: nausea and vomiting caused by cancer drugs 30 Tab 2  
 fluticasone propionate (FLONASE) 50 mcg/actuation nasal spray 2 SPRAYS IN BOTH NOSTRILS DAILY 1 Bottle 0  
 cycloSPORINE (RESTASIS) 0.05 % dpet  albuterol (PROVENTIL VENTOLIN) 2.5 mg /3 mL (0.083 %) nebu Take 2.5 mg by inhalation.  dextroamphetamine-amphetamine (ADDERALL) 20 mg tablet TAKE 1 TABLET BY MOUTH EVERY DAY  0  
 rosuvastatin (CRESTOR) 10 mg tablet TAKE 1 TABLET BY MOUTH EVERY DAY  0  
 telmisartan (MICARDIS) 20 mg tablet TAKE 1 TABLET BY MOUTH EVERY DAY 90 Tab 3  
 atorvastatin (LIPITOR) 20 mg tablet  BD ULTRA-FINE SHORT PEN NEEDLE 31 gauge x 5/16\" ndle USE TO INJECT INSULIN 5 TIMES DAILY 200 Pen Needle 11  
 insulin aspart U-100 (NOVOLOG FLEXPEN U-100 INSULIN) 100 unit/mL (3 mL) inpn INJECT 20 UNITS BEFORE EACH MEAL + 4 UNITS FOR EVERY 50 MG/DL ABOVE 150 MG/DL.  UNITS PER DAY 90 Adjustable Dose Pre-filled Pen Syringe 3  
 LEVEMIR FLEXTOUCH U-100 INSULN 100 unit/mL (3 mL) inpn INJECT 30 UNITS IN AM AND 50 UNITS AT NIGHT. INCREASE AS DIRECTED TO  UNITS/DAY. 90 Adjustable Dose Pre-filled Pen Syringe 3  DEXILANT 60 mg CpDB capsule (delayed release) TAKE 1 CAPSULE BY MOUTH EVERY DAY 90 Cap 3  
 LINZESS 145 mcg cap capsule TAKE 1 CAPSULE BY MOUTH EVERY DAYY 30 Cap 0  
 levothyroxine (SYNTHROID) 150 mcg tablet TAKE 1 TABLET BY MOUTH EVERY DAY BEFORE BREAKFAST 90 Tab 0  
 EPINEPHrine (EPIPEN) 0.3 mg/0.3 mL injection INJECT 0.3 ML BY INTRAMUSCULAR ROUTE ONCE AS NEEDED FOR UP TO 1 DOSE.  1  
 glucose blood VI test strips (TRUE METRIX GLUCOSE TEST STRIP) strip by Does Not Apply route See Admin Instructions. 30 Strip 5  
 lancets misc by Does Not Apply route daily. True Metrix lancets- test blood sugar once per day 30 Each 5  Blood-Glucose Meter monitoring kit Check up to tid, as directed 1 Kit 0  
 azelastine (ASTELIN) 137 mcg (0.1 %) nasal spray 1 [place] by Both Nostrils route daily as needed. Use in each nostril as directed  insulin detemir U-100 (LEVEMIR) 100 unit/mL injection 20 Units by SubCUTAneous route daily (with lunch).  montelukast (SINGULAIR) 10 mg tablet Take 10 mg by mouth nightly.  ascorbic acid, vitamin C, (VITAMIN C) 1,000 mg tablet Take 1,000 mg by mouth daily.  albuterol (PROVENTIL VENTOLIN) 2.5 mg /3 mL (0.083 %) nebulizer solution 2.5 mg by Nebulization route every four (4) hours as needed for Wheezing.  Liraglutide (VICTOZA) 0.6 mg/0.1 mL (18 mg/3 mL) pnij 1.8 mg by SubCUTAneous route daily (with lunch). 3 Pen 3  
 SYMBICORT 160-4.5 mcg/actuation HFAA Take 2 Puffs by inhalation two (2) times daily as needed. 2 Inhaler 3  clonazePAM (KLONOPIN) 0.5 mg tablet Take 0.5 mg by mouth nightly as needed.  sertraline (ZOLOFT) 50 mg tablet TAKE 1 TABLET BY MOUTH as needed  1  cholecalciferol, VITAMIN D3, (VITAMIN D3) 5,000 unit tab tablet Take 5,000 Units by mouth daily.  furosemide (LASIX) 40 mg tablet Take 40 mg by mouth every other day. 3 No current facility-administered medications on file prior to visit. Allergies: (reviewed) Allergies Allergen Reactions  Carboplatin Other (comments) Patient developed shortness of breath, felt faint, coughing, skin flushed and \"itchy\". This occurred on the patients 8th treatment.   
 Iodinated Contrast Media Rash 13 hr pre-medication prior to IV contrast.  
Patient has done well with 1 hr pre-medication of (Solu-Medrol) 40mg &  (Benadryl) 50mg.  Lipitor [Atorvastatin] Myalgia  Shellfish Containing Products Hives  Tape [Adhesive] Itching and Other (comments) \"op site-- clear,thin tape\"--caused blister  Tomato Hives  Iodine And Iodide Containing Products Rash  Olmesartan Other (comments)  Losartan Other (comments)  
  headaches  Percocet [Oxycodone-Acetaminophen] Other (comments) Fever; however, current home med OBJECTIVE *deferred today given video-conference visit for ongoing COVID-19 pandemic* Physical Exam 
Visit Vitals LMP 09/07/2011 Physical Exam: 
General: Alert and oriented. No acute distress. Well-nourished HEENT: No thyroid enlargment. Neck supple without restrictions. Sclera normal. Normal occular motion. Moist mucous membranes. Lymphatics: No evidence of axillary, cervical, or subclavicular adenopathy. Respiratory: clear to auscultation and percussion to the bases. No CVAT. Chest wall mass is still palpable along the superior sternum at the level of the superior aspect of the breasts, but improved. Normal overlying skin Cardiovascular: regular rate and rhythm. No murmurs, rubs, or gallops. Gastrointestinal: soft, non-tender, non-distended, no masses or organomegaly. Well-healed incision. Musculoskeletal: normal gait. No joint tenderness, deformity or swelling. No muscular tenderness. Extremities: extremities normal, atraumatic, mild 1+ edema bilaterally. Pelvic: deferred today Neuro: Grossly intact. Normal gait and movement. No acute deficit Skin: No evidence of rashes or skin changes. DATE REVIEW as available: 
Lab Results Component Value Date/Time  WBC 3.2 (L) 02/10/2021 08:26 AM  
 Hemoglobin (POC) 10.5 (L) 03/20/2017 12:15 PM  
 HGB 6.4 (L) 02/10/2021 08:26 AM  
 Hematocrit (POC) 31 (L) 03/20/2017 12:15 PM  
 HCT 21.0 (L) 02/10/2021 08:26 AM  
 PLATELET 399 74/51/9446 08:26 AM  
 MCV 97.2 02/10/2021 08:26 AM  
 
Lab Results Component Value Date/Time  
 ABS. NEUTROPHILS 1.7 (L) 02/10/2021 08:26 AM  
 
Lab Results Component Value Date/Time Sodium 141 02/10/2021 08:26 AM  
 Potassium 3.9 02/10/2021 08:26 AM  
 Chloride 113 (H) 02/10/2021 08:26 AM  
 CO2 23 02/10/2021 08:26 AM  
 Anion gap 5 02/10/2021 08:26 AM  
 Glucose 211 (H) 02/10/2021 08:26 AM  
 BUN 29 (H) 02/10/2021 08:26 AM  
 Creatinine 1.37 (H) 02/10/2021 08:26 AM  
 BUN/Creatinine ratio 21 (H) 02/10/2021 08:26 AM  
 GFR est AA 48 (L) 02/10/2021 08:26 AM  
 GFR est non-AA 39 (L) 02/10/2021 08:26 AM  
 Calcium 9.3 02/10/2021 08:26 AM  
 Bilirubin, total 0.5 02/10/2021 08:26 AM  
 Alk. phosphatase 64 02/10/2021 08:26 AM  
 Protein, total 7.4 02/10/2021 08:26 AM  
 Albumin 3.4 (L) 02/10/2021 08:26 AM  
 Globulin 4.0 02/10/2021 08:26 AM  
 A-G Ratio 0.9 (L) 02/10/2021 08:26 AM  
 ALT (SGPT) 47 02/10/2021 08:26 AM  
 
Ca-125: 
6/11/2020: 268 
9/3/2020: 187 ECHO 7/17/18 Left ventricle: Systolic function was normal. Ejection fraction was estimated in the range of 55 % to 60 %. There were no regional wall motion 
abnormalities. Wall thickness was mildly increased. Left atrium: The atrium was mildly dilated. Mitral valve: There was mild regurgitation. Aortic valve: Leaflets exhibited sclerosis without stenosis. Not well visualized. IMPRESSION AND PLAN: 
Ms. Ann Smith is a 62 y.o. female with recurrent, metastatic ovarian cancer. Heavily pre-treated. Lost to follow-up since 9/2018, at which time patient self-discontinued weekly paclitaxel. Initiated [place] Millet on 1/23/19. Held after 1 month secondary to rising creatinine. Found to have right hydronephrosis. Right ureteral stent placed 3/18/19 with normalization of creatinine. Completed 2700 cGy to the sternun 7/11/19. Restarted Zejula at 100mg/daily on 4/23/19. Now with rising Ca-125 and progression on CT 6/24/2020. Initiated on weekly Taxol 80mg/m2 days 1, 8, and 15 in a 28-day cycle on 8/6/2020. Problem List Items Addressed This Visit Endocrine Ovarian cancer ([facility] [address] - Primary Immune Lymphadenopathy, mediastinal  
  
 Respiratory SHABANA on CPAP Other Carcinomatosis ([address] Morbid obesity ([address] On antineoplastic chemotherapy Mass of chest wall Reviewed patient's course to date. Presents today for consideration of cycle 8 weekly paclitaxel. Recent RLE DVT. Remains on Eliquis. Patient to remain on Eliquis indefinitely given her active ongoing treatment of her cancer. Tolerated cycle 1-7 well. Her Ca-125 is responding to treatment nicely. CT C/A/P 1/20/2021 with significant improvement in disease burden. Will follow-up pre-chemo labs. Plan to continue treatment for as long as Ca-125 continues to respond. Given that she is responding so nicely, I would like to continue treatment with paclitaxel for as long as possible. She is tolerating treatment well. May consider maintenance PARPi in the future with either olaparib or rucaparib, although the use of PARPi after prior PARPi is not well studied, although what is out there is promising. She remains anemic, which is certainly related to her prior radiation and chemotherapy and her underlying medical comorbid conditions. Will plan to transfuse as needed if symptomatic. She is heavily pre-treated and her bone marrow reserve remains low. Continue iron supplementation. Urology is managing her stent and recurrent UTIs. Has not required antibiotics for some time. Stent exchanged on 10/30/2020 per patient. F/u in 2/2021. The patient has underlying diabetes and neuropathy, which is actually improved right now per the patient's report. I expressed my concerns that we will need to watch this closely while on weekly Taxol, as this may certainly worsen her diabetic neuropathy.  
 
All questions and concerns were addressed with the patient and she is comfortable with the plan. An electronic signature was used to authenticate this note. Kat Powers MD 
 
Pursuant to the emergency declaration unde the [address] [place] [address] waiver authority and the [employer] Act, this Virtual Visit was conducted, with patient's consent, to reduce the patient's risk of exposure to COVID-19 and provide continuity of care for an established patient. Patient identification was verified at the start of the visit: Yes Services were provided through a video synchronous discussion virtually to substitute for in-person clinic visit. Patient was at her individual home, while the provider was in his/her respective office. I spent at least 25 minutes with this established patient, and >50% of the time was spent counseling and/or coordinating care regarding chemotherapy Kat Powers MD

## 2021-02-18 NOTE — TELEPHONE ENCOUNTER
Immediate Postoperative / Post-Procedure Note    Preoperative Diagnosis: _   nasal airway obstruction bony pyramind assymetry and severe septal deviation into L airway seconcary to traumatic nasal fx 2016      Postoperative Diagnosis: _ same      Procedure(s): _  SMR, bilateral turbinate reduction, open nasal reduction     Surgeon/Proceduralist: _ ruslan    Assistant: _  none    Anesthesia Type: _get        Estimated Blood Loss: _ 150 cc    Transfusion Summary: _  crystaloid    Specimen(s): _ nasal septum, turbinates and bone     Grafts/Implants: _ none     Complications: _  none           Unable to leave a message for patient mailbox is full. An appointment was scheduled for patient for 8/2/17 at 3:30m with Dr. Kristine Torres when she returns call.
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
## 2021-02-26 PROBLEM — R06.02 SOB (SHORTNESS OF BREATH): Status: ACTIVE | Noted: 2021-01-01

## 2021-02-26 PROBLEM — R53.83 FATIGUE: Status: ACTIVE | Noted: 2021-01-01

## 2021-02-26 NOTE — TELEPHONE ENCOUNTER
I called and spoke with patient, she has been assigned room 325.   She was advised to come to [address] and go to the left, admissions registration, pt verbalized understanding

## 2021-02-26 NOTE — DISCHARGE INSTRUCTIONS
[facility] [address]  P [phone]  F [phone]     James Marie      Dear Ms. Ana Chang      Please review your instructions with your nurse and ask any questions so you have all the information you need to recover well at home. If you do not feel you have everything you need to succeed and be safe after you leave the hospital, please discuss these concerns with your nurse. As always, call for any questions at home. Your doctor: Dr. Carlie Cook  Diagnosis: No diagnosis found. Procedure: Chemotherapy infusion  Date of Discharge: 2/27/21      Take Home Medications     See Discharge Medication Review provided to you by your nurse. If you did not receive one, request this prior to your discharge. · It is important that you take your medications as they are prescribed. · Keep your medications in the bottles provided by the pharmacist and keep a list of the medication names, dosages, times to be taken and what they are for in your wallet. · Do not take other medications without consulting your doctor. · You should take a daily stool softener such as a colace pill or dulcolax suppository for constipation as this is not uncommon after surgery and/or while on pain medication. If not prescribed, this can be found over the counter. If constipation persists for >24 hours you should take a dose of Milk of Magnesia. Call if your constipation continues. Diet    · Resume your regular diet. Stay hydrated and drink fluids such as gatorade and water. This will also help prevent constipation and dehydration. Limit somewhat any usual caffeine intake of beverages such as soda, tea and coffee as this may serve to dehydrate you. · Small frequent meals may help if you are having trouble with nausea or your appetite. Some people also do well with nutritional supplements.        Activity   The following are general precautions that chemo patients should take:  · If possible, have someone with you at all times until you feel stable. · Gradually increase your activity each day. There are generally no restrictions on walking, climbing stairs or riding in a car. Try to walk at least 4 times per day. Take frequent breaks and ask for help if you need it. · Exercise is very important: It will increase circulation and will help the fatigue. Do what you can each day. · No driving while on pain medication. · Practice good hand washing:    Use soap and water for at least 15 seconds, covering all areas of hands.  Always wash hands before eating.  Wash hands after contact with public surfaces such as door knobs and handles, shopping carts, telephones and elevator buttons. · Practice good oral hygiene: Notify your M.D. immediately if any mouth sores or discomfort develop. Causes For Concern    Signs/Symptoms of an allergic reaction and/or some side effects may require immediate medical attention. Notify your physician if you develop one or more of the following (If after hours and you cannot reach an on-call physician, call 911):    Skin redness, itching, swelling, blistering, weeping, crusting, rash, or hives; Wheezing, chest tightness, cough, or shortness of breath;   Swelling of the face, eyelids, lips, tongue, or throat;   Severe, persistent headache;   Stuffy nose, runny nose, sneezing;   Stomach pain, nausea, vomiting, diarrhea, or bloody stools;   Mouth sores    Your physician should also be aware of the following symptoms:     Persistent and unresolved nausea and/or vomiting;   Persistent and unresolved diarrhea or constipation;   Numbness/tingling/burning of the extremities, including the fingers and toes; Bleeding or unexplained bruising; Unexplained redness/swelling/pain in the arms or legs;    Shortness of breath or fatigue that worsens;   Pain with urination or blood in the urine;   Cough, especially a productive cough;   Mouth sores or a white coating of the tongue; Redness, swelling, pain or drainage at the port-a-cath or IV site; Increased feeling of bloating or water retention; Excessive weight loss or gain;   Ringing in the ears; Difficulty swallowing;   Dizziness, vertigo, lightheadedness, or fainting. Follow-Up    Call [phone] to schedule an appointment with your oncologist if you do not already have your prechemo visits scheduled. Information obtained by :  I understand that if any problems occur once I am at home I am to contact my physician. I understand and acknowledge receipt of the instructions indicated above.                                                                                                                                            Physician's or R.N.'s Signature                                                                  Date/Time                                                                                                                                              Patient or Representative Signature                                                          Date/Time

## 2021-02-26 NOTE — TELEPHONE ENCOUNTER
I called and spoke with patient, no chairs available at infusion, per Pedrito Molina, will try and direct admit her, and we will call her once we have a bed and let her know where to go

## 2021-02-26 NOTE — H&P
[address] [facility], [address] [facility], [address] 
P [phone]  F [phone] Office Visit Patient ID: 
Name: Ana Chang MRN: [id number] : 1960/60 y.o. Visit date: 2021 LEONID Arroyo is an established patient 62 y.o.  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 The patient's previous treatment include primary debulking and adjuvant Taxol/carbo with retreatment in  for recurrence. Subsequent lines of chemo as outlined below. She has most recently started on retrial of single agent weekly Taxol in 2020 following progression with chest wall involvement while on PARPi. Most recent EOD imaging in  showed overall response to therapy. She is s/p R ureteral stenting in situ.   
   
 
OncTx History: 
12: MARAH/BSO/Cytoreductive surgery on 12 noted carcinomatosis/omental caking. 10/2012-2013: 6 cycles Carbo/Taxol 2014-2014: 6 Cycles carbo/Taxol 3/2015-5/13/15: 3 cycles Avastin/Doxil until progression 2015-2015 Weekly Topotecan  
2015-3/2016: Weekly Topotecan 2017-2017[facility] D1/D8 Q28 days for 6 cycles 2018: CT core needle bx chest wall mass: Metastatic high-grade serous carcinoma (see comment) Positive for malignancy. 2018-2018: Taxol weekly 2019: 10 fx 30cGy palliative RT to sternal lesion 2019 - 2020 [place]. Underwent right ureteral stent placement on 3/18/19. 
2020: CT CAP variable response to therapy with several lesions decreased in size. However a majority of the lesions have increased in size consistent with progression of disease with involvement of the mediastinum, liver masses, and peritoneal carcinomatosis. Increase right-sided hydronephrosis. Increasing . 
2020 - initiated Taxol weekly 10/2020 - RLE DVT on Eliquis 1/2021: CT CAP Response to therapy with significant decrease in size of thoracic lymphadenopathy, liver metastases, abdominal lymphadenopathy, and pelvic lymphadenopathy. Slight increased mild left hydronephrosis of uncertain significance. CT Results (most recent): 
Results from [facility] Encounter encounter on 01/20/21 CT ABD PELV W CONT Narrative INDICATION:   Right ovarian cancer EXAM:  CT chest, abdomen, and pelvis WITH  CONTRAST 
 
COMPARISON:  CT chest abdomen and pelvis 6/24/2020 TECHNIQUE:  Thin collimation axial images were obtained through the chest, 
abdomen, and pelvis with IV contrast administration. Coronal and sagittal 
reconstructions were obtained. Oral contrast was administered. CT dose 
reduction was achieved through use of a standardized protocol tailored for this 
examination and automatic exposure control for dose modulation. FINDINGS: 
 
Chest: 
 
LUNGS: Medial right middle lobe and minimal medial left upper lobe opacities 
favor scarring. Minimal left basilar subsegmental atelectasis versus scarring. No other significant pulmonary abnormality. No suspicious nodule or mass. The 
central airways are patent. LYMPH NODES: Decrease in size of bulky prevascular anterior mediastinal and 
middle mediastinal lymphadenopathy. Prevascular lymph node measures 5.0 x 1.6 cm 
(series 3, image 21), compared to 6.0 x 2.5 cm previously. Middle mediastinal 
lymph node measures 1.7 x 1.67 m (image 28), compared to 2.4 x 2.3 cm. Anterior 
cardiophrenic lymphadenopathy measures smaller measuring 3.7 cm (image 41), 
compared to 4.4 cm. PLEURAL FLUID: No pleural effusion. PERICARDIAL FLUID: No pericardial effusion. THYROID: No thyroid nodule. OTHER: Left chest port extends to the proximal right atrium. Abdomen: LIVER: Decreased bulky left hepatic low-density lesions. A representative lesion 
measures 4.2 x 1.8 cm (image 51), compared to 5.8 x 3.8 cm. A few low-density lesions in the right hepatic lobe are relatively stable with a representative 
lesion measuring 3.5 cm (image 60). No biliary ductal dilatation. GALLBLADDER: Decompressed without calcified stone. SPLEEN: Decreased infiltrative mass at the hilum measured 3.4 cm (image 52), 
compared to 5.8 cm previously. PANCREAS: No mass or ductal dilatation. ADRENALS: Unremarkable. KIDNEYS/URETERS: Symmetric nephrograms with mild bilateral renal cortical 
atrophy and scarring. Stable right ureteral stent with relatively unchanged mild 
to moderate right hydronephrosis. Slight increased mild left hydronephrosis of 
uncertain significance. PERITONEUM: Central mesenteric mass lesions are markedly smaller. A 
representative lesion measures 7.0 x 3.9 cm (image 66), compared to 9.9 x 5.6 
cm. Decrease in size in left peritoneal mass measuring 6.5 cm (image 57), 
compared to 7.4 cm. Retroperitoneal masses smaller with a representative lesion 
measuring 4.7 x 3.5 cm (image 79), compared to 6.5 x 4.0 cm. No significant 
ascites. COLON: No dilatation or wall thickening. APPENDIX: Not visualized SMALL BOWEL: No dilatation or wall thickening. STOMACH: Unremarkable. Pelvis: PELVIS: Decrease in pelvic lymphadenopathy and size of pelvic masses. A left 
external iliac chain lymph node measures 17 mm in short axis (image 107), 
compared to 32 mm previously. A right lower pelvic mass measures 5.3 cm in 
greatest length (image 109), compared to 7.0 cm. No pelvic free fluid. BONES: Stable appearance of the bones with sclerotic lesion in the sternum ADDITIONAL COMMENTS: N/A Impression Response to therapy with significant decrease in size of thoracic 
lymphadenopathy, liver metastases, abdominal lymphadenopathy, and pelvic 
lymphadenopathy. Slight increased mild left hydronephrosis of uncertain significance. CT 6/2020: 
FINDINGS:  
THYROID: No nodule. MEDIASTINUM: Left paratracheal adenopathy now measures 3.7 x 2.0 cm increased in 
size. Mass in AP window which extends superiorly into the left infraclavicular 
region and inferiorly into the left hilum is also increase in size. When 
measured in a similar fashion this measures 3.5 x 2.4 previously 3.0 x 2.9 cm. Right parasternal mass is decreased in size measuring 3.4 x 3.4 cm. Subcarinal 
adenopathy measures 2.8 x 2.3 cm increased in size. There is increased size of 
the nodular component adjacent to the xiphoid now measuring 2.6 x 3.3 cm. SWATI: Left hilar lymph node measures 3.2 x 2.4 cm previously 2.5 x 1.8 cm. THORACIC AORTA: No dissection or aneurysm. MAIN PULMONARY ARTERY: Normal in caliber. TRACHEA/BRONCHI: Patent. ESOPHAGUS: No wall thickening or dilatation. HEART: Normal in size. PLEURA: No effusion or pneumothorax. LUNGS: No nodule, mass, or airspace disease. LIVER: Multiple masses are noted and better visualized on current study 
secondary to the addition of intravenous contrast. Comparison lesions measure 
previously, there is a 3.5 x 2.8 cm mass in the right hepatic lobe series 3 
image 58 slightly increased in size. There is a 3.6 x 2.9 cm mass in the left 
hepatic lobe slightly decreased in size series 3 image 56. Left hepatic lobe 
mass series 3 image 45 measuring 4.0 x 2.8 cm slightly increased in size. There 
is extensive tumor infiltration in the liver hilum with narrowing of the main 
portal vein as well as the intrahepatic portal veins. The common hepatic artery 
is not well visualized and likely occluded with some collateral flow noted in 
the liver hilum. GALLBLADDER: Not visualized SPLEEN: Mass in the splenic hilum increased in size measuring 7.4 x 4.3 cm with 
invasion into the splenic hilum. PANCREAS: The pancreas is encased by tumor. ADRENALS: There is likely involvement of the left adrenal gland tumor. KIDNEYS: Right nephroureteral stent with mild to moderate right-sided hydronephrosis. STOMACH: Tumor encasing the gastric antrum. SMALL BOWEL: No dilatation or wall thickening. COLON: Tumor abutting the tumor encasing the rectosigmoid junction. Tumor 
abutting the transverse colon. APPENDIX: Unremarkable. PERITONEUM: Some free fluid noted in the pelvis. Increased size of mesenteric 
masses. Representative examples: 7.8 x 4.3 cm tumor anterior peritoneum series 3 
image 84. No pelvic tumor 7.5 x 4.8 cm series 3 image 100. RETROPERITONEUM:  
Right pelvic sidewall increased in size. Large right lateral lymph node 
measuring 3.1 x 2.0 cm not significantly changed. REPRODUCTIVE ORGANS: The visualized URINARY BLADDER: No mass or calculus. BONES: No destructive bone lesion. ADDITIONAL COMMENTS: N/A 
  
IMPRESSION IMPRESSION: 
1. There is some variable response to therapy with several lesions decreased in 
size. However a majority of the lesions have increased in size consistent with 
progression of disease with involvement of the mediastinum, liver masses, and 
peritoneal carcinomatosis. 2.  Increase right-sided hydronephrosis. Jose Daniel Stafford SUBJECTIVE 2/26/2021: 
Underwent chemotherapy yesterday. Per notes was present, no issues. She has been tolerating her therapy quite well, requiring palliative PRBC transfusions. She calls today c/o progressive fatigue and ECKERT, feels she cannot wait until next week. Advised admission for evaluation and planned blood transfusion, recent hgb 5.9. Noted light-headed, dizzy, seeing \"spots\". Fatigue/SOB exacerbated. Noting return of blood urine more voluminous. No dysuria or shalini clot. Prior hx of UTIs. No N/V, dairrhea/constipation. Leg neuropathy persists, improved on Neurontin. ROS 10 pt negative except as per above PMH: 
Past Medical History:  
Diagnosis Date  Adverse effect of anesthesia   
 sleep apnea cpap machine useage  Anemia  Arthritis DDD low back and knees  Asthma uses albuterol prn only; none x 6 mos. Never hospitalized  BRCA negative 2013  Calculus of kidney  Chronic kidney disease   
 kidney stones  Chronic pain   
 knee pain bilat  CINV (chemotherapy-induced nausea and vomiting) 2014  Decreased hearing, right PATIENT STATES THIS IS DUE TO CHEMOTHERAPY  Diabetes ([address] Age 46 IDDM  Environmental allergies  Fibromyalgia  GERD (gastroesophageal reflux disease)   
 controlled with med  Hx of pulmonary embolus during pregnancy 2015  Hydronephrosis due to obstruction of ureter 3/18/2019  Hypertension  Ill-defined condition Sepsis  -  SOURCE PORT  
 Ill-defined condition 2016  
 chemotherapy  Mass of chest wall 2018  Morbid obesity ([address]  Murmur, heart  Other and unspecified hyperlipidemia  Ovarian cancer ([address] 2012, 2014  
 serous, high grade, stage IIIc. s/p chemotx (has port)  Psychiatric disorder Depression/Anxiety  Rectal bleeding  Thromboembolus ([address]  POST PARTUM; resolved- PELVIS  Thyroid disease HYPOTHYROID  Unspecified sleep apnea CPAP, Dr. Terrance Young PSH: 
Past Surgical History:  
Procedure Laterality Date [place]  
  X2  
 HX HYSTERECTOMY  2012 ROULA/BSO, tumor debulking, omentectomy for ovarian cancer  HX ORTHOPAEDIC Right   
 ankle-FX, R  
 HX ORTHOPAEDIC Right   
 FX R ARM  
 HX UROLOGICAL Right  STENT FOR KIDNEY STONES  
 HX VASCULAR ACCESS  2015  
 right chest- port placed  HX VASCULAR ACCESS Left 2017 TETE CATH  
 SD BREAST SURGERY PROCEDURE UNLISTED Lymph node in left breast 2017 SOC: 
Social History Socioeconomic History  Marital status:  Spouse name: Not on file  Number of children: Not on file  Years of education: Not on file  Highest education level: Not on file Occupational History  Not on file Social Needs  Financial resource strain: Not on file  Food insecurity Worry: Not on file Inability: Not on file  Transportation needs Medical: Not on file Non-medical: Not on file Tobacco Use  Smoking status: Never Smoker  Smokeless tobacco: Never Used Substance and Sexual Activity  Alcohol use: Yes Comment: 1 drink per month  Drug use: No  
 Sexual activity: Yes Lifestyle  Physical activity Days per week: Not on file Minutes per session: Not on file  Stress: Not on file Relationships  Social connections Talks on phone: Not on file Gets together: Not on file Attends Anabaptist service: Not on file Active member of club or organization: Not on file Attends meetings of clubs or organizations: Not on file Relationship status: Not on file  Intimate partner violence Fear of current or ex partner: Not on file Emotionally abused: Not on file Physically abused: Not on file Forced sexual activity: Not on file Other Topics Concern  Not on file Social History Narrative Lives in [facility] alone.  passed away in 5/16 of a heart attack. Has 2 sons. Disability. 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 Enjoys swimming and going to the gym. Family History Family History Problem Relation Age of Onset  Hypertension Mother Coral Dugan Arthritis-osteo Mother  Hypertension Father  Arthritis-osteo Father  Cancer Father PROSTATE  COPD Father  Hypertension Brother  Elevated Lipids Brother  Arthritis-osteo Brother  Hypertension Brother  Elevated Lipids Brother  Obesity Brother  Cancer Brother PROSTATE  Anesth Problems Son DELAYED AWAKENING  
 Diabetes Maternal Grandmother  Anesth Problems Paternal Grandmother PATIENT STATES GRANDMOTHER'S HEART STOPPED DURING SURGERY Medications: (reviewed) No current facility-administered medications on file prior to encounter. Current Outpatient Medications on File Prior to Encounter Medication Sig Dispense Refill  amoxicillin (AMOXIL) 500 mg capsule TAKE 4 CAPSULES BY MOUTH 1 HOUR PRIOR TO APPT  Eliquis 5 mg tablet TAKE 1 TAB BY MOUTH TWO (2) TIMES A DAY FOR 30 DAYS.  gabapentin (NEURONTIN) 100 mg capsule Take 2 Caps by mouth nightly. Max Daily Amount: 200 mg. Indications: neuropathic pain 60 Cap 0  
 dexAMETHasone (DECADRON) 4 mg tablet TAKE 2 TABLETS WITH BREAKFAST THE DAY BEFORE CHEMO AND FOR 2 DAYS AFTER CHEMO 36 Tab 2  predniSONE (DELTASONE) 50 mg tablet Take 1 tablet 13 hours prior to scan, take 1 tablet 7 hours prior to scan and take 1 tablet one hour prior to scan 3 Tab 0  
 omega-3 acid ethyl esters (LOVAZA) 1 gram capsule TAKE 1 CAPSULE BY MOUTH TWICE A  Cap 2  
 rosuvastatin (CRESTOR) 10 mg tablet Take 10 mg by mouth nightly.  doxazosin (CARDURA) 1 mg tablet Take  by mouth daily.  doxazosin (CARDURA) 1 mg tablet Take 1 mg by mouth daily.  SULFAMETHOXAZOLE-TRIMETHOPRIM PO Take  by mouth two (2) times a day. 7 days  lidocaine-prilocaine (EMLA) topical cream Apply small amount over port area and cover with band aid one hour before chemo 30 g 3  
 ondansetron (ZOFRAN ODT) 4 mg disintegrating tablet Take 1 Tab by mouth every eight (8) hours as needed for Nausea. Indications: nausea and vomiting caused by cancer drugs 30 Tab 2  
 fluticasone propionate (FLONASE) 50 mcg/actuation nasal spray 2 SPRAYS IN BOTH NOSTRILS DAILY 1 Bottle 0  
 cycloSPORINE (RESTASIS) 0.05 % dpet  albuterol (PROVENTIL VENTOLIN) 2.5 mg /3 mL (0.083 %) nebu Take 2.5 mg by inhalation.     
 dextroamphetamine-amphetamine (ADDERALL) 20 mg tablet TAKE 1 TABLET BY MOUTH EVERY DAY  0  
 rosuvastatin (CRESTOR) 10 mg tablet TAKE 1 TABLET BY MOUTH EVERY DAY  0  
  telmisartan (MICARDIS) 20 mg tablet TAKE 1 TABLET BY MOUTH EVERY DAY 90 Tab 3  
 atorvastatin (LIPITOR) 20 mg tablet  BD ULTRA-FINE SHORT PEN NEEDLE 31 gauge x 5/16\" ndle USE TO INJECT INSULIN 5 TIMES DAILY 200 Pen Needle 11  
 insulin aspart U-100 (NOVOLOG FLEXPEN U-100 INSULIN) 100 unit/mL (3 mL) inpn INJECT 20 UNITS BEFORE EACH MEAL + 4 UNITS FOR EVERY 50 MG/DL ABOVE 150 MG/DL.  UNITS PER DAY 90 Adjustable Dose Pre-filled Pen Syringe 3  
 LEVEMIR FLEXTOUCH U-100 INSULN 100 unit/mL (3 mL) inpn INJECT 30 UNITS IN AM AND 50 UNITS AT NIGHT. INCREASE AS DIRECTED TO  UNITS/DAY. 90 Adjustable Dose Pre-filled Pen Syringe 3  DEXILANT 60 mg CpDB capsule (delayed release) TAKE 1 CAPSULE BY MOUTH EVERY DAY 90 Cap 3  
 LINZESS 145 mcg cap capsule TAKE 1 CAPSULE BY MOUTH EVERY DAYY 30 Cap 0  
 levothyroxine (SYNTHROID) 150 mcg tablet TAKE 1 TABLET BY MOUTH EVERY DAY BEFORE BREAKFAST 90 Tab 0  
 EPINEPHrine (EPIPEN) 0.3 mg/0.3 mL injection INJECT 0.3 ML BY INTRAMUSCULAR ROUTE ONCE AS NEEDED FOR UP TO 1 DOSE.  1  
 glucose blood VI test strips (TRUE METRIX GLUCOSE TEST STRIP) strip by Does Not Apply route See Admin Instructions. 30 Strip 5  
 lancets misc by Does Not Apply route daily. True Metrix lancets- test blood sugar once per day 30 Each 5  Blood-Glucose Meter monitoring kit Check up to tid, as directed 1 Kit 0  
 azelastine (ASTELIN) 137 mcg (0.1 %) nasal spray 1 [place] by Both Nostrils route daily as needed. Use in each nostril as directed  insulin detemir U-100 (LEVEMIR) 100 unit/mL injection 20 Units by SubCUTAneous route daily (with lunch).  montelukast (SINGULAIR) 10 mg tablet Take 10 mg by mouth nightly.  ascorbic acid, vitamin C, (VITAMIN C) 1,000 mg tablet Take 1,000 mg by mouth daily.  albuterol (PROVENTIL VENTOLIN) 2.5 mg /3 mL (0.083 %) nebulizer solution 2.5 mg by Nebulization route every four (4) hours as needed for Wheezing.  Liraglutide (VICTOZA) 0.6 mg/0.1 mL (18 mg/3 mL) pnij 1.8 mg by SubCUTAneous route daily (with lunch). 3 Pen 3  
 SYMBICORT 160-4.5 mcg/actuation HFAA Take 2 Puffs by inhalation two (2) times daily as needed. 2 Inhaler 3  clonazePAM (KLONOPIN) 0.5 mg tablet Take 0.5 mg by mouth nightly as needed.  sertraline (ZOLOFT) 50 mg tablet TAKE 1 TABLET BY MOUTH as needed  1  cholecalciferol, VITAMIN D3, (VITAMIN D3) 5,000 unit tab tablet Take 5,000 Units by mouth daily.  furosemide (LASIX) 40 mg tablet Take 40 mg by mouth every other day. 3 Allergies: (reviewed) Allergies Allergen Reactions  Carboplatin Other (comments) Patient developed shortness of breath, felt faint, coughing, skin flushed and \"itchy\". This occurred on the patients 8th treatment.  Iodinated Contrast Media Rash 13 hr pre-medication prior to IV contrast.  
Patient has done well with 1 hr pre-medication of (Solu-Medrol) 40mg &  (Benadryl) 50mg.  Lipitor [Atorvastatin] Myalgia  Shellfish Containing Products Hives  Tape [Adhesive] Itching and Other (comments) \"op site-- clear,thin tape\"--caused blister  Tomato Hives  Iodine And Iodide Containing Products Rash  Olmesartan Other (comments)  Losartan Other (comments)  
  headaches  Percocet [Oxycodone-Acetaminophen] Other (comments) Fever; however, current home med OBJECTIVE Physical Exam 
Visit Vitals LMP 09/07/2011 GENERAL BRIAN: Conversant, pleasant, alert, oriented. NAD HEENT: Oropharynx clear. Sclera anicteric. No JVD. No adenopathy CV: RRR with systolic murmur RESPIRATORY: CTA bilat GASTROINT: soft, non-tender, without masses or organomegaly, no fluid wave. Habitus limits exam. Previous RUQ nodule gone EXTREMITIES: bilat 1+ edema/girth nonpitting. Nontender here. Feet warm, mobile, sensate. ALCON SURVEY: Negative NEURO: Grossly intact, no acute deficit. Oriented. Not depressed. Not agitated. Wt Readings from Last 3 Encounters:  
02/24/21 273 lb (123.8 kg) 02/10/21 278 lb 12.8 oz (126.5 kg) 02/03/21 282 lb (127.9 kg) DATE REVIEW as available: 
Lab Results Component Value Date/Time WBC 8.7 02/24/2021 01:42 PM  
 Hemoglobin (POC) 10.5 (L) 03/20/2017 12:15 PM  
 HGB 5.9 (LL) 02/24/2021 01:42 PM  
 Hematocrit (POC) 31 (L) 03/20/2017 12:15 PM  
 HCT 19.5 (L) 02/24/2021 01:42 PM  
 PLATELET 848 25/77/5966 01:42 PM  
 MCV 95.1 02/24/2021 01:42 PM  
 
 
Lab Results Component Value Date/Time  
 ABS. NEUTROPHILS 5.6 02/24/2021 01:42 PM  
 
Lab Results Component Value Date/Time Sodium 142 02/24/2021 01:42 PM  
 Potassium 3.9 02/24/2021 01:42 PM  
 Chloride 111 (H) 02/24/2021 01:42 PM  
 CO2 23 02/24/2021 01:42 PM  
 Anion gap 8 02/24/2021 01:42 PM  
 Glucose 171 (H) 02/24/2021 01:42 PM  
 BUN 25 (H) 02/24/2021 01:42 PM  
 Creatinine 1.39 (H) 02/24/2021 01:42 PM  
 BUN/Creatinine ratio 18 02/24/2021 01:42 PM  
 GFR est AA 47 (L) 02/24/2021 01:42 PM  
 GFR est non-AA 39 (L) 02/24/2021 01:42 PM  
 Calcium 8.9 02/24/2021 01:42 PM  
 Bilirubin, total 0.5 02/24/2021 01:42 PM  
 Alk. phosphatase 65 02/24/2021 01:42 PM  
 Protein, total 7.6 02/24/2021 01:42 PM  
 Albumin 3.0 (L) 02/24/2021 01:42 PM  
 Globulin 4.6 (H) 02/24/2021 01:42 PM  
 A-G Ratio 0.7 (L) 02/24/2021 01:42 PM  
 ALT (SGPT) 30 02/24/2021 01:42 PM  
 
 
CA-125 Date Value Ref Range Status 02/24/2021 36 (H) 1.5 - 35.0 U/mL Final  
  Comment:  
  ** Note new reference range and method ** Results may vary depending on . Method used is NeoAccel 
  
01/27/2021 37 (H) 1.5 - 35.0 U/mL Final  
  Comment:  
  ** Note new reference range and method ** Results may vary depending on . Method used is NeoAccel 
  
12/29/2020 46 (H) 1.5 - 35.0 U/mL Final  
  Comment:  
  ** Note new reference range and method ** Results may vary depending on . Method used is Roomixer 
  
12/03/2020 48 (H) 1.5 - 35.0 U/mL Final  
  Comment:  
  ** Note new reference range and method ** Results may vary depending on . Method used is Roomixer 
  
11/05/2020 52 (H) 1.5 - 35.0 U/mL Final  
  Comment:  
  ** Note new reference range and method ** Results may vary depending on . Method used is Roomixer ECHO 1/2020 · Normal cavity size and systolic function (ejection fraction normal). Mild concentric hypertrophy. Estimated left ventricular ejection fraction is 55 - 60%. Visually measured ejection fraction. No regional wall motion abnormality noted. · Mildly dilated left atrium. · Mild aortic valve sclerosis. · Trace mitral valve regurgitation is present. IMPRESSION AND PLAN: 
62 y.o. with diffusely metastatic ovarian cancer. Continues on palliative chemo, retrial taxol Labs/chart reviewed 
-s/p C8 Taxol 2/25. Notable response on last imaging.   
-Anemia: d/t disease/chemo/hematuria. No CAD, signs of End organ dysfunction. Transfuse for overt sx today. F/u with urology. Check UA 
-SOB/fatigue/ECKERT - multifactorial chemo, chronic disease, Asthma, anemia. CXR, T&C for 2 units PRBCs 
-CKD stage 2-3: stable  
-Hydronephrosis/dysuria/hematuria: stent in place. Exchanged October 2020.  
-RLE DVT acute/chronic: on NOAC. Improved on recent US 
-DM: SSI, diabetic diet 
-SHABANA: uses noc CPAP. Not with her. PRN oxygen -HTN: continue home medications 
-Neuropathy d/t chemo: Neurontin 
-Dispo: Stable, present for transfusion with sx anemia. Labs AM. Plan for discharge tomorrow AM. Electronically signed Angelina Fernandez PA-C 
2/26/2021

## 2021-02-26 NOTE — TELEPHONE ENCOUNTER
Pt , she states she received her chemo on 2/24/2021 and her HGB was 5.9, she states she was not given any blood at that time, she states she is very weak, feeling lightheaded and is having shortness of breath. She states \"feels like it has dropped again\".   I spoke with Stephanie Rios and I called over to infusion and spoke with Bri Burr and she said there are no chairs available and they wouldn't be able to give her the blood until Monday

## 2021-02-27 NOTE — PROGRESS NOTES
[id number] Verbal shift change report given to Cyndi Brown RN (oncoming nurse) by Mark Finley RN (offgoing nurse). Report included the following information SBAR.  
 
[id number] [facility] de-accessed in preparation for discharge.

## 2021-02-27 NOTE — PROGRESS NOTES
[address] [facility], [address] [place], [address] 
P [phone]  F [phone] Patient Name: Aramis Glover Admit Date: 2/26/2021 OR Date: * No surgery found * Visit Date: 2/27/2021 SUBJECTIVE: 
 
Feels much better this AM following 2 units PRBCs. OBJECTIVE: 
 
Patient Vitals for the past 24 hrs: 
 Temp Pulse Resp BP SpO2  
02/27/21 0100 98.1 °F (36.7 °C) 67 16 130/71 96 % 02/27/21 0000 98 °F (36.7 °C) 67 16 110/60 96 % 02/26/21 2330 98.1 °F (36.7 °C) 72 16 (!) 151/81 97 % 02/26/21 2315 98 °F (36.7 °C) 75 16 134/76 96 % 02/26/21 2201 98.3 °F (36.8 °C) 79 16 113/69 96 % 02/26/21 2100 98.5 °F (36.9 °C) 81 16 123/76 96 % 02/26/21 2000 98.3 °F (36.8 °C) 79 16 133/82 96 % 02/26/21 1930 98.4 °F (36.9 °C) 82 16 120/75 96 % 02/26/21 1916 98 °F (36.7 °C) 82 16 130/77 98 % 02/26/21 1852 98.5 °F (36.9 °C) 81 16 125/74 98 % 02/26/21 1525 98.3 °F (36.8 °C) 95 16 (!) 140/74 96 % Date 02/26/21 0700 - 02/27/21 6097 02/27/21 0700 - 02/28/21 4309 Shift 4562-9222 8875-0517 24 Hour Total 6367-2190 8597-9649 24 Hour Total  
INTAKE Shift Total        
OUTPUT Urine Urine Occurrence(s) 1 x  1 x Shift Total        
NET Weight (kg) Physical Exam 
   General:  alert, cooperative, no distress Cardiac:  Regular rate and rhythm Lungs:  clear to auscultation bilaterally Abdomen:  soft, non-tender, without masses or organomegaly Wound:  n/a Extremity: extremities normal, atraumatic, no cyanosis or edema Data Review Lab Results Component Value Date/Time WBC 8.7 02/24/2021 01:42 PM  
 ABS. NEUTROPHILS 5.6 02/24/2021 01:42 PM  
 HGB 7.4 (L) 02/27/2021 05:39 AM  
 HCT 24.0 (L) 02/27/2021 05:39 AM  
 MCV 95.1 02/24/2021 01:42 PM  
 MCH 28.8 02/24/2021 01:42 PM  
 PLATELET 484 41/45/6692 01:42 PM  
 
Lab Results Component Value Date/Time  Sodium 141 02/27/2021 05:39 AM  
 Potassium 4.3 02/27/2021 05:39 AM  
 Chloride 110 (H) 02/27/2021 05:39 AM  
 CO2 25 02/27/2021 05:39 AM  
 Glucose 209 (H) 02/27/2021 05:39 AM  
 BUN 31 (H) 02/27/2021 05:39 AM  
 Creatinine 1.01 02/27/2021 05:39 AM  
 Calcium 8.8 02/27/2021 05:39 AM  
 Albumin 3.0 (L) 02/24/2021 01:42 PM  
 Bilirubin, total 0.5 02/24/2021 01:42 PM  
 ALT (SGPT) 30 02/24/2021 01:42 PM  
 Alk. phosphatase 65 02/24/2021 01:42 PM  
 
IMPRESSION/PLAN: 
 
* No surgery found *  for * No surgery found * Oncologic:  Metastatic ovarian cancer. Currently on palliative chemotherapy with single agent Taxol. Heme/CV:  Hemodynamically stable. Appropriate response to transfusion. Renal:  Adequate UOP. Stable creatinine. FEN/GI:  Diet as tolerated. ID/Wound:  Afebrile. No evidence of infection. PPX:  Ambulation. Dispostion:  Doing well today. Will plan for discharge later this morning. To follow-up with Dr. Stella Vuong in a few weeks.   
 
  
Evelyn Andrews MD 
2/27/2021 
9:05 AM

## 2021-03-01 NOTE — TELEPHONE ENCOUNTER
Pt , she states she saw her nephrologist and he put her on a suppressant ABX, one that she will take every day, she did not know the name of it, she hadn't picked it up from the pharmacy, she wanted you to have the update

## 2021-03-03 NOTE — PROGRESS NOTES
[facility] - Chemotherapy Progress Note 
 
0900 Pt admit to [facility] for Taxol ambulatory in stable condition. Assessment completed. No new concerns voiced. Port accessed with positive blood return. Labs drawn and sent for processing. IV attached to NS at [facility] Patient denies SOB, fever, cough, and/or general not feeling well. Patient denies recent exposure to someone who has tested positive for COVID-19. Patient denies contact with anyone who has a pending COVID test.  
 
Chemotherapy Flowsheet 3/3/2021 Cycle C8 D8 Date 3/3/2021 Drug / Regimen Taxol Dosage - Time Up - Time Down -  
Pre Hydration -  
Post Hydration -  
Pre Meds given Notes given Visit Vitals BP (!) 169/89 Pulse 79 Temp 97.1 °F (36.2 °C) Ht 5' 8\" (1.727 m) Wt 123.6 kg (272 lb 6.4 oz) LMP 09/07/2011 SpO2 99% Breastfeeding No  
BMI 41.42 kg/m² Medications: 
Medications Administered 0.9% sodium chloride infusion Admin Date 
03/03/2021 Action New Bag Dose 25 mL/hr Rate 25 mL/hr Route IntraVENous Administered By Joan Duran RN  
  
  
 0.9% sodium chloride injection 10 mL Admin Date 
03/03/2021 Action Given Dose 
10 mL Route IntraVENous Administered By Joan Duran RN  
  
  
 dexamethasone (DECADRON) 12 mg in 0.9% sodium chloride 50 mL, overfill volume 5 mL IVPB Admin Date 
03/03/2021 Action Given Dose 
12 mg Rate 232 mL/hr Route IntraVENous Administered By Joan Duran RN  
  
  
 diphenhydrAMINE (BENADRYL) injection 50 mg   
 Admin Date 
03/03/2021 Action Given Dose 50 mg Route IntraVENous Administered By Joan Duran RN  
  
  
 famotidine (PF) (PEPCID) 20 mg in 0.9% sodium chloride 10 mL injection Admin Date 
03/03/2021 Action Given Dose 
20 mg Route IntraVENous Administered By Joan Duran RN  
  
  
 heparin (porcine) pf 300-500 Units Admin Date 
03/03/2021 Action Given Dose 
500 Units Route InterCATHeter Administered By Aniceto Zhou NIKIA CAPPS  
  
  
 PACLitaxeL (TAXOL) 197 mg in 0.9% sodium chloride 250 mL, overfill volume 25 mL chemo infusion Admin Date 
03/03/2021 Action New Bag Dose 
197 mg Rate 
307.8 mL/hr Route IntraVENous Administered By Sujey Nath RN  
  
  
 palonosetron HCl (ALOXI) injection 0.25 mg   
 Admin Date 
03/03/2021 Action Given Dose 0.25 mg Route IntraVENous Administered By Sujey Nath RN  
  
  
 saline peripheral flush soln 10 mL Admin Date 
03/03/2021 Action Given Dose 
10 mL Route InterCATHeter Administered By Sujey Nath RN Admin Date 
03/03/2021 Action Given Dose 
10 mL Route InterCATHeter Administered By Sujey Nath RN  
  
  
  
 
 
1300 Pt tolerated treatment well. Port maintained positive blood return throughout treatment, flushed with positive blood return at conclusion. D/c home ambulatory in no distress. Pt aware of next appointment scheduled for 3/10/21 Recent Results (from the past 24 hour(s)) CBC WITH AUTOMATED DIFF Collection Time: 03/03/21  9:21 AM  
Result Value Ref Range WBC 7.9 3.6 - 11.0 K/uL  
 RBC 2.65 (L) 3.80 - 5.20 M/uL HGB 7.7 (L) 11.5 - 16.0 g/dL HCT 24.6 (L) 35.0 - 47.0 % MCV 92.8 80.0 - 99.0 FL  
 MCH 29.1 26.0 - 34.0 PG  
 MCHC 31.3 30.0 - 36.5 g/dL  
 RDW 18.7 (H) 11.5 - 14.5 % PLATELET 243 413 - 247 K/uL MPV 9.6 8.9 - 12.9 FL  
 NRBC 0.3 (H) 0  WBC ABSOLUTE NRBC 0.02 (H) 0.00 - 0.01 K/uL NEUTROPHILS 69 32 - 75 % LYMPHOCYTES 14 12 - 49 % MONOCYTES 9 5 - 13 % EOSINOPHILS 5 0 - 7 % BASOPHILS 1 0 - 1 % IMMATURE GRANULOCYTES 2 (H) 0.0 - 0.5 % ABS. NEUTROPHILS 5.5 1.8 - 8.0 K/UL  
 ABS. LYMPHOCYTES 1.1 0.8 - 3.5 K/UL  
 ABS. MONOCYTES 0.7 0.0 - 1.0 K/UL  
 ABS. EOSINOPHILS 0.4 0.0 - 0.4 K/UL  
 ABS. BASOPHILS 0.1 0.0 - 0.1 K/UL  
 ABS. IMM. GRANS. 0.2 (H) 0.00 - 0.04 K/UL  
 DF AUTOMATED METABOLIC PANEL, COMPREHENSIVE Collection Time: 03/03/21  9:21 AM  
Result Value Ref Range  Sodium 139 136 - 145 mmol/L Potassium 4.0 3.5 - 5.1 mmol/L Chloride 110 (H) 97 - 108 mmol/L  
 CO2 22 21 - 32 mmol/L Anion gap 7 5 - 15 mmol/L Glucose 181 (H) 65 - 100 mg/dL BUN 32 (H) 6 - 20 MG/DL Creatinine 1.35 (H) 0.55 - 1.02 MG/DL  
 BUN/Creatinine ratio 24 (H) 12 - 20 GFR est AA 49 (L) >60 ml/min/1.73m2 GFR est non-AA 40 (L) >60 ml/min/1.73m2 Calcium 8.7 8.5 - 10.1 MG/DL Bilirubin, total 0.5 0.2 - 1.0 MG/DL  
 ALT (SGPT) 35 12 - 78 U/L  
 AST (SGOT) 21 15 - 37 U/L Alk. phosphatase 66 45 - 117 U/L Protein, total 7.1 6.4 - 8.2 g/dL Albumin 3.3 (L) 3.5 - 5.0 g/dL Globulin 3.8 2.0 - 4.0 g/dL A-G Ratio 0.9 (L) 1.1 - 2.2

## 2021-03-03 NOTE — PROGRESS NOTES
[address] [facility], [address] [place], [facility] 
P [phone]  F [phone] Office Visit Patient ID: 
Name: Zach Deutsch MRN: [id number] : 1960/60 y.o. Visit date: 3/3/2021 LEONID Holt is an established patient 62 y.o.  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 The patient's previous treatment include primary debulking and adjuvant Taxol/carbo with retreatment in  for recurrence. Subsequent lines of chemo as outlined below. She has most recently started on retrial of single agent weekly Taxol in 2020 following progression with chest wall involvement while on PARPi. Most recent EOD imaging in  showed overall response to therapy. She is s/p R ureteral stenting in situ.   
   
 
OncTx History: 
12: MARAH/BSO/Cytoreductive surgery on 12 noted carcinomatosis/omental caking. 10/2012-2013: 6 cycles Carbo/Taxol 2014-2014: 6 Cycles carbo/Taxol 3/2015-5/13/15: 3 cycles Avastin/Doxil until progression 2015-2015 Weekly Topotecan  
2015-3/2016: Weekly Topotecan 2017-2017Hazen [place] D1/D8 Q28 days for 6 cycles 2018: CT core needle bx chest wall mass: Metastatic high-grade serous carcinoma (see comment) Positive for malignancy. 2018-2018: Taxol weekly 2019: 10 fx 30cGy palliative RT to sternal lesion 2019 - 2020 [place]. Underwent right ureteral stent placement on 3/18/19. 
2020: CT CAP variable response to therapy with several lesions decreased in size. However a majority of the lesions have increased in size consistent with progression of disease with involvement of the mediastinum, liver masses, and peritoneal carcinomatosis. Increase right-sided hydronephrosis. Increasing . 
2020 - initiated Taxol weekly 10/2020 - RLE DVT on Eliquis 1/2021: CT CAP Response to therapy with significant decrease in size of thoracic lymphadenopathy, liver metastases, abdominal lymphadenopathy, and pelvic lymphadenopathy. Slight increased mild left hydronephrosis of uncertain significance. CT Results (most recent): 
Results from [facility] Encounter encounter on 01/20/21 CT ABD PELV W CONT Narrative INDICATION:   Right ovarian cancer EXAM:  CT chest, abdomen, and pelvis WITH  CONTRAST 
 
COMPARISON:  CT chest abdomen and pelvis 6/24/2020 TECHNIQUE:  Thin collimation axial images were obtained through the chest, 
abdomen, and pelvis with IV contrast administration. Coronal and sagittal 
reconstructions were obtained. Oral contrast was administered. CT dose 
reduction was achieved through use of a standardized protocol tailored for this 
examination and automatic exposure control for dose modulation. FINDINGS: 
 
Chest: 
 
LUNGS: Medial right middle lobe and minimal medial left upper lobe opacities 
favor scarring. Minimal left basilar subsegmental atelectasis versus scarring. No other significant pulmonary abnormality. No suspicious nodule or mass. The 
central airways are patent. LYMPH NODES: Decrease in size of bulky prevascular anterior mediastinal and 
middle mediastinal lymphadenopathy. Prevascular lymph node measures 5.0 x 1.6 cm 
(series 3, image 21), compared to 6.0 x 2.5 cm previously. Middle mediastinal 
lymph node measures 1.7 x 1.67 m (image 28), compared to 2.4 x 2.3 cm. Anterior 
cardiophrenic lymphadenopathy measures smaller measuring 3.7 cm (image 41), 
compared to 4.4 cm. PLEURAL FLUID: No pleural effusion. PERICARDIAL FLUID: No pericardial effusion. THYROID: No thyroid nodule. OTHER: Left chest port extends to the proximal right atrium. Abdomen: LIVER: Decreased bulky left hepatic low-density lesions. A representative lesion 
measures 4.2 x 1.8 cm (image 51), compared to 5.8 x 3.8 cm. A few low-density lesions in the right hepatic lobe are relatively stable with a representative 
lesion measuring 3.5 cm (image 60). No biliary ductal dilatation. GALLBLADDER: Decompressed without calcified stone. SPLEEN: Decreased infiltrative mass at the hilum measured 3.4 cm (image 52), 
compared to 5.8 cm previously. PANCREAS: No mass or ductal dilatation. ADRENALS: Unremarkable. KIDNEYS/URETERS: Symmetric nephrograms with mild bilateral renal cortical 
atrophy and scarring. Stable right ureteral stent with relatively unchanged mild 
to moderate right hydronephrosis. Slight increased mild left hydronephrosis of 
uncertain significance. PERITONEUM: Central mesenteric mass lesions are markedly smaller. A 
representative lesion measures 7.0 x 3.9 cm (image 66), compared to 9.9 x 5.6 
cm. Decrease in size in left peritoneal mass measuring 6.5 cm (image 57), 
compared to 7.4 cm. Retroperitoneal masses smaller with a representative lesion 
measuring 4.7 x 3.5 cm (image 79), compared to 6.5 x 4.0 cm. No significant 
ascites. COLON: No dilatation or wall thickening. APPENDIX: Not visualized SMALL BOWEL: No dilatation or wall thickening. STOMACH: Unremarkable. Pelvis: PELVIS: Decrease in pelvic lymphadenopathy and size of pelvic masses. A left 
external iliac chain lymph node measures 17 mm in short axis (image 107), 
compared to 32 mm previously. A right lower pelvic mass measures 5.3 cm in 
greatest length (image 109), compared to 7.0 cm. No pelvic free fluid. BONES: Stable appearance of the bones with sclerotic lesion in the sternum ADDITIONAL COMMENTS: N/A Impression Response to therapy with significant decrease in size of thoracic 
lymphadenopathy, liver metastases, abdominal lymphadenopathy, and pelvic 
lymphadenopathy. Slight increased mild left hydronephrosis of uncertain significance. CT 6/2020: 
FINDINGS:  
THYROID: No nodule. MEDIASTINUM: Left paratracheal adenopathy now measures 3.7 x 2.0 cm increased in 
size. Mass in AP window which extends superiorly into the left infraclavicular 
region and inferiorly into the left hilum is also increase in size. When 
measured in a similar fashion this measures 3.5 x 2.4 previously 3.0 x 2.9 cm. Right parasternal mass is decreased in size measuring 3.4 x 3.4 cm. Subcarinal 
adenopathy measures 2.8 x 2.3 cm increased in size. There is increased size of 
the nodular component adjacent to the xiphoid now measuring 2.6 x 3.3 cm. SWATI: Left hilar lymph node measures 3.2 x 2.4 cm previously 2.5 x 1.8 cm. THORACIC AORTA: No dissection or aneurysm. MAIN PULMONARY ARTERY: Normal in caliber. TRACHEA/BRONCHI: Patent. ESOPHAGUS: No wall thickening or dilatation. HEART: Normal in size. PLEURA: No effusion or pneumothorax. LUNGS: No nodule, mass, or airspace disease. LIVER: Multiple masses are noted and better visualized on current study 
secondary to the addition of intravenous contrast. Comparison lesions measure 
previously, there is a 3.5 x 2.8 cm mass in the right hepatic lobe series 3 
image 58 slightly increased in size. There is a 3.6 x 2.9 cm mass in the left 
hepatic lobe slightly decreased in size series 3 image 56. Left hepatic lobe 
mass series 3 image 45 measuring 4.0 x 2.8 cm slightly increased in size. There 
is extensive tumor infiltration in the liver hilum with narrowing of the main 
portal vein as well as the intrahepatic portal veins. The common hepatic artery 
is not well visualized and likely occluded with some collateral flow noted in 
the liver hilum. GALLBLADDER: Not visualized SPLEEN: Mass in the splenic hilum increased in size measuring 7.4 x 4.3 cm with 
invasion into the splenic hilum. PANCREAS: The pancreas is encased by tumor. ADRENALS: There is likely involvement of the left adrenal gland tumor. KIDNEYS: Right nephroureteral stent with mild to moderate right-sided hydronephrosis. STOMACH: Tumor encasing the gastric antrum. SMALL BOWEL: No dilatation or wall thickening. COLON: Tumor abutting the tumor encasing the rectosigmoid junction. Tumor 
abutting the transverse colon. APPENDIX: Unremarkable. PERITONEUM: Some free fluid noted in the pelvis. Increased size of mesenteric 
masses. Representative examples: 7.8 x 4.3 cm tumor anterior peritoneum series 3 
image 84. No pelvic tumor 7.5 x 4.8 cm series 3 image 100. RETROPERITONEUM:  
Right pelvic sidewall increased in size. Large right lateral lymph node 
measuring 3.1 x 2.0 cm not significantly changed. REPRODUCTIVE ORGANS: The visualized URINARY BLADDER: No mass or calculus. BONES: No destructive bone lesion. ADDITIONAL COMMENTS: N/A 
  
IMPRESSION IMPRESSION: 
1. There is some variable response to therapy with several lesions decreased in 
size. However a majority of the lesions have increased in size consistent with 
progression of disease with involvement of the mediastinum, liver masses, and 
peritoneal carcinomatosis. 2.  Increase right-sided hydronephrosis. Brain Small SUBJECTIVE 3/3/2021: 
Admitted last weekend for transfusion, noted UTI. Presents today feeling much improved. Seen by urologist.   
-SOB improved s/p transfusion. Exercising on stationary bike and w/ exercise videos at her own pace. Chronic SOB with extended activity.  
-Hematuria improved. On abx for UTI currently. No F/C, dysuria. Last cystoscopy with stent exchange in October -Right leg pain improved. US improved thrombus. She remains on eliquis for persistent RLE DVT. Swelling/discomfort has subsided. Hx right knee OA with right hip pain, s/p recent fall on ice. She ambulates at home with rare rollator or cane use if acute right knee pain d/t extensive OA. Goes up/down stairs slowly and unassisted currently. No weakness/light headedness. -No N/V. Appetite stable, noted wt gain. She drinks 4 bottles water/day with gatorade. +BM daily to every other day with miralax. Takes tramadol nightly for generalized discomforts. 
-Sleep much improved with use of CPAP following sleep study findings.  
-She has chronic pedal/hand numbness/tingling due to taxol and DM, PM pain. Seen by her diabetic provider, A1C 6.2. Follows with podiatrist.  
Denies any change in depression, continues medication. Two adult sons at home who cook for her. Previously fostered 2 young boys she was going to adopt, they have since transitioned to new homes. She is trying to focus on herself and health. ROS 10 pt negative except as per above PMH: 
Past Medical History:  
Diagnosis Date  Adverse effect of anesthesia   
 sleep apnea cpap machine useage  Anemia  Arthritis DDD low back and knees  Asthma   
 uses albuterol prn only; none x 6 mos. Never hospitalized  BRCA negative 1/2013  Calculus of kidney  Chronic kidney disease   
 kidney stones  Chronic pain   
 knee pain bilat  CINV (chemotherapy-induced nausea and vomiting) 8/21/2014  Decreased hearing, right PATIENT STATES THIS IS DUE TO CHEMOTHERAPY  Diabetes ([address] Age 46 IDDM  Environmental allergies  Fibromyalgia  GERD (gastroesophageal reflux disease)   
 controlled with med  Hx of pulmonary embolus during pregnancy 1/18/2015  Hydronephrosis due to obstruction of ureter 3/18/2019  Hypertension  Ill-defined condition Sepsis 9-2015 -  SOURCE PORT  
 Ill-defined condition 2016  
 chemotherapy  Mass of chest wall 05/2018  Morbid obesity ([address]  Murmur, heart  Other and unspecified hyperlipidemia  Ovarian cancer ([address] 9/2012, 1/2014  
 serous, high grade, stage IIIc. s/p chemotx (has port)  Psychiatric disorder Depression/Anxiety  Rectal bleeding  Thromboembolus ([address] 1993  POST PARTUM; resolved- PELVIS  
  Thyroid disease HYPOTHYROID  Unspecified sleep apnea CPAP, Dr. Jolene Menjivar PSH: 
Past Surgical History:  
Procedure Laterality Date   
  X2  
 HX HYSTERECTOMY  2012 ROULA/BSO, tumor debulking, omentectomy for ovarian cancer  HX ORTHOPAEDIC Right   
 ankle-FX, R  
 HX ORTHOPAEDIC Right   
 FX R ARM  
 HX UROLOGICAL Right 2017 STENT FOR KIDNEY STONES  
 HX VASCULAR ACCESS  2015  
 right chest- port placed  HX VASCULAR ACCESS Left 2017 TETE CATH  
 LA BREAST SURGERY PROCEDURE UNLISTED Lymph node in left breast 2017 SOC: 
Social History Socioeconomic History  Marital status:  Spouse name: Not on file  Number of children: Not on file  Years of education: Not on file  Highest education level: Not on file Occupational History  Not on file Social Needs  Financial resource strain: Not on file  Food insecurity Worry: Not on file Inability: Not on file  Transportation needs Medical: Not on file Non-medical: Not on file Tobacco Use  Smoking status: Never Smoker  Smokeless tobacco: Never Used Substance and Sexual Activity  Alcohol use: Yes Comment: 1 drink per month  Drug use: No  
 Sexual activity: Yes Lifestyle  Physical activity Days per week: Not on file Minutes per session: Not on file  Stress: Not on file Relationships  Social connections Talks on phone: Not on file Gets together: Not on file Attends Mu-ism service: Not on file Active member of club or organization: Not on file Attends meetings of clubs or organizations: Not on file Relationship status: Not on file  Intimate partner violence Fear of current or ex partner: Not on file Emotionally abused: Not on file Physically abused: Not on file Forced sexual activity: Not on file Other Topics Concern  Not on file Social History Narrative Lives in [facility] alone.  passed away in 5/16 of a heart attack. Has 2 sons. Disability. 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 Enjoys swimming and going to the gym. Family History Family History Problem Relation Age of Onset  Hypertension Mother Blase Liming Arthritis-osteo Mother  Hypertension Father  Arthritis-osteo Father  Cancer Father PROSTATE  COPD Father  Hypertension Brother  Elevated Lipids Brother  Arthritis-osteo Brother  Hypertension Brother  Elevated Lipids Brother  Obesity Brother  Cancer Brother PROSTATE  Anesth Problems Son DELAYED AWAKENING  
 Diabetes Maternal Grandmother  Anesth Problems Paternal Grandmother PATIENT STATES GRANDMOTHER'S HEART STOPPED DURING SURGERY Medications: (reviewed) Current Outpatient Medications on File Prior to Encounter Medication Sig Dispense Refill  Eliquis 5 mg tablet TAKE 1 TAB BY MOUTH TWO (2) TIMES A DAY FOR 30 DAYS. 60 Tab 2  
 [DISCONTINUED] ciprofloxacin HCl (CIPRO) 500 mg tablet Take 1 Tab by mouth two (2) times a day. 14 Tab 0  
 amoxicillin (AMOXIL) 500 mg capsule TAKE 4 CAPSULES BY MOUTH 1 HOUR PRIOR TO APPT  gabapentin (NEURONTIN) 100 mg capsule Take 2 Caps by mouth nightly. Max Daily Amount: 200 mg. Indications: neuropathic pain 60 Cap 0  
 dexAMETHasone (DECADRON) 4 mg tablet TAKE 2 TABLETS WITH BREAKFAST THE DAY BEFORE CHEMO AND FOR 2 DAYS AFTER CHEMO 36 Tab 2  predniSONE (DELTASONE) 50 mg tablet Take 1 tablet 13 hours prior to scan, take 1 tablet 7 hours prior to scan and take 1 tablet one hour prior to scan 3 Tab 0  
 omega-3 acid ethyl esters (LOVAZA) 1 gram capsule TAKE 1 CAPSULE BY MOUTH TWICE A  Cap 2  
 rosuvastatin (CRESTOR) 10 mg tablet Take 10 mg by mouth nightly.  doxazosin (CARDURA) 1 mg tablet Take  by mouth daily.  doxazosin (CARDURA) 1 mg tablet Take 1 mg by mouth daily.  SULFAMETHOXAZOLE-TRIMETHOPRIM PO Take  by mouth two (2) times a day. 7 days  lidocaine-prilocaine (EMLA) topical cream Apply small amount over port area and cover with band aid one hour before chemo 30 g 3  
 ondansetron (ZOFRAN ODT) 4 mg disintegrating tablet Take 1 Tab by mouth every eight (8) hours as needed for Nausea. Indications: nausea and vomiting caused by cancer drugs 30 Tab 2  
 fluticasone propionate (FLONASE) 50 mcg/actuation nasal spray 2 SPRAYS IN BOTH NOSTRILS DAILY 1 Bottle 0  
 cycloSPORINE (RESTASIS) 0.05 % dpet  albuterol (PROVENTIL VENTOLIN) 2.5 mg /3 mL (0.083 %) nebu Take 2.5 mg by inhalation.  telmisartan (MICARDIS) 20 mg tablet TAKE 1 TABLET BY MOUTH EVERY DAY 90 Tab 3  
 atorvastatin (LIPITOR) 20 mg tablet  insulin aspart U-100 (NOVOLOG FLEXPEN U-100 INSULIN) 100 unit/mL (3 mL) inpn INJECT 20 UNITS BEFORE EACH MEAL + 4 UNITS FOR EVERY 50 MG/DL ABOVE 150 MG/DL.  UNITS PER DAY 90 Adjustable Dose Pre-filled Pen Syringe 3  
 LEVEMIR FLEXTOUCH U-100 INSULN 100 unit/mL (3 mL) inpn INJECT 30 UNITS IN AM AND 50 UNITS AT NIGHT. INCREASE AS DIRECTED TO  UNITS/DAY. 90 Adjustable Dose Pre-filled Pen Syringe 3  DEXILANT 60 mg CpDB capsule (delayed release) TAKE 1 CAPSULE BY MOUTH EVERY DAY 90 Cap 3  
 LINZESS 145 mcg cap capsule TAKE 1 CAPSULE BY MOUTH EVERY DAYY 30 Cap 0  
 levothyroxine (SYNTHROID) 150 mcg tablet TAKE 1 TABLET BY MOUTH EVERY DAY BEFORE BREAKFAST 90 Tab 0  
 EPINEPHrine (EPIPEN) 0.3 mg/0.3 mL injection INJECT 0.3 ML BY INTRAMUSCULAR ROUTE ONCE AS NEEDED FOR UP TO 1 DOSE.  1  
 azelastine (ASTELIN) 137 mcg (0.1 %) nasal spray 1 [place] by Both Nostrils route daily as needed. Use in each nostril as directed  insulin detemir U-100 (LEVEMIR) 100 unit/mL injection 20 Units by SubCUTAneous route daily (with lunch).  montelukast (SINGULAIR) 10 mg tablet Take 10 mg by mouth nightly.  ascorbic acid, vitamin C, (VITAMIN C) 1,000 mg tablet Take 1,000 mg by mouth daily.  albuterol (PROVENTIL VENTOLIN) 2.5 mg /3 mL (0.083 %) nebulizer solution 2.5 mg by Nebulization route every four (4) hours as needed for Wheezing.  Liraglutide (VICTOZA) 0.6 mg/0.1 mL (18 mg/3 mL) pnij 1.8 mg by SubCUTAneous route daily (with lunch). 3 Pen 3  
 SYMBICORT 160-4.5 mcg/actuation HFAA Take 2 Puffs by inhalation two (2) times daily as needed. 2 Inhaler 3  clonazePAM (KLONOPIN) 0.5 mg tablet Take 0.5 mg by mouth nightly.  sertraline (ZOLOFT) 50 mg tablet TAKE 1 TABLET BY MOUTH as needed  1  cholecalciferol, VITAMIN D3, (VITAMIN D3) 5,000 unit tab tablet Take 5,000 Units by mouth daily.  furosemide (LASIX) 40 mg tablet Take 20 mg by mouth every other day. 3 No current facility-administered medications on file prior to encounter. Allergies: (reviewed) Allergies Allergen Reactions  Carboplatin Other (comments) Patient developed shortness of breath, felt faint, coughing, skin flushed and \"itchy\". This occurred on the patients 8th treatment.  Iodinated Contrast Media Rash 13 hr pre-medication prior to IV contrast.  
Patient has done well with 1 hr pre-medication of (Solu-Medrol) 40mg &  (Benadryl) 50mg.  Lipitor [Atorvastatin] Myalgia  Shellfish Containing Products Hives  Tape [Adhesive] Itching and Other (comments) \"op site-- clear,thin tape\"--caused blister  Tomato Hives  Iodine And Iodide Containing Products Rash  Olmesartan Other (comments)  Losartan Other (comments)  
  headaches  Percocet [Oxycodone-Acetaminophen] Other (comments) Fever; however, current home med OBJECTIVE Physical Exam 
Visit Vitals BP (!) 180/77 (BP 1 Location: Left arm, BP Patient Position: At rest) Pulse 80 Temp 97.1 °F (36.2 °C) Ht 5' 8\" (1.727 m) Wt 272 lb 6.4 oz (123.6 kg) LMP 09/07/2011 SpO2 99% BMI 41.42 kg/m² GENERAL BRIAN: Conversant, pleasant, alert, oriented. NAD HEENT: Oropharynx clear. Sclera anicteric. No JVD. No adenopathy CV: RRR  
RESPIRATORY: CTA bilat GASTROINT: soft, non-tender, without masses or organomegaly, no fluid wave. Habitus limits exam. Previous RUQ nodule gone EXTREMITIES: bilat 1+ edema/girth nonpitting. Nontender here. Feet warm, mobile, sensate. ALCON SURVEY: Negative NEURO: Grossly intact, no acute deficit. Oriented. Not depressed. Not agitated. Wt Readings from Last 3 Encounters:  
03/03/21 272 lb 6.4 oz (123.6 kg) 02/24/21 273 lb (123.8 kg) 02/10/21 278 lb 12.8 oz (126.5 kg) DATE REVIEW as available: 
Lab Results Component Value Date/Time WBC 7.9 03/03/2021 09:21 AM  
 Hemoglobin (POC) 10.5 (L) 03/20/2017 12:15 PM  
 HGB 7.7 (L) 03/03/2021 09:21 AM  
 Hematocrit (POC) 31 (L) 03/20/2017 12:15 PM  
 HCT 24.6 (L) 03/03/2021 09:21 AM  
 PLATELET 822 14/49/2540 09:21 AM  
 MCV 92.8 03/03/2021 09:21 AM  
 
Lab Results Component Value Date/Time  
 ABS. NEUTROPHILS 5.5 03/03/2021 09:21 AM  
 
Lab Results Component Value Date/Time Sodium 139 03/03/2021 09:21 AM  
 Potassium 4.0 03/03/2021 09:21 AM  
 Chloride 110 (H) 03/03/2021 09:21 AM  
 CO2 22 03/03/2021 09:21 AM  
 Anion gap 7 03/03/2021 09:21 AM  
 Glucose 181 (H) 03/03/2021 09:21 AM  
 BUN 32 (H) 03/03/2021 09:21 AM  
 Creatinine 1.35 (H) 03/03/2021 09:21 AM  
 BUN/Creatinine ratio 24 (H) 03/03/2021 09:21 AM  
 GFR est AA 49 (L) 03/03/2021 09:21 AM  
 GFR est non-AA 40 (L) 03/03/2021 09:21 AM  
 Calcium 8.7 03/03/2021 09:21 AM  
 Bilirubin, total 0.5 03/03/2021 09:21 AM  
 Alk.  phosphatase 66 03/03/2021 09:21 AM  
 Protein, total 7.1 03/03/2021 09:21 AM  
 Albumin 3.3 (L) 03/03/2021 09:21 AM  
 Globulin 3.8 03/03/2021 09:21 AM  
 A-G Ratio 0.9 (L) 03/03/2021 09:21 AM  
 ALT (SGPT) 35 03/03/2021 09:21 AM  
 
 
CA-125  
 Date Value Ref Range Status 02/24/2021 36 (H) 1.5 - 35.0 U/mL Final  
  Comment:  
  ** Note new reference range and method ** Results may vary depending on . Method used is [employer] 
  
01/27/2021 37 (H) 1.5 - 35.0 U/mL Final  
  Comment:  
  ** Note new reference range and method ** Results may vary depending on . Method used is [employer] 
  
12/29/2020 46 (H) 1.5 - 35.0 U/mL Final  
  Comment:  
  ** Note new reference range and method ** Results may vary depending on . Method used is [employer] 
  
12/03/2020 48 (H) 1.5 - 35.0 U/mL Final  
  Comment:  
  ** Note new reference range and method ** Results may vary depending on . Method used is [employer] 
  
11/05/2020 52 (H) 1.5 - 35.0 U/mL Final  
  Comment:  
  ** Note new reference range and method ** Results may vary depending on . Method used is [employer] ECHO 1/2020 · Normal cavity size and systolic function (ejection fraction normal). Mild concentric hypertrophy. Estimated left ventricular ejection fraction is 55 - 60%. Visually measured ejection fraction. No regional wall motion abnormality noted. · Mildly dilated left atrium. · Mild aortic valve sclerosis. · Trace mitral valve regurgitation is present. IMPRESSION AND PLAN: 
62 y.o. with diffusely metastatic ovarian cancer. Continues on palliative chemo, retrial taxol Labs/chart reviewed 
-C8 Taxol, ongoing, responsive with decreased lesions 
-CKD stage 2-3 - stable. Follows with nephrology. 
-Right hydronephrosis/dysuria/hematuria - stent in place. Exchanged October 2020. Recurrent UTIs. May need ppx abx. Ask she d/w her urologist.  
 Noted new mild left hydro. Compensated. UTI - Cipro resistant Ecoli. +candiduria. Antibiosis Rxs sent. 
-Liver mets - asx 
-RLE DVT acute/chronic - on NOAC.  Improved on recent US 
 -Anemia d/t disease/chemo/hematuria. Transfuse PRN sx.  
-ECKERT/debility - Hx Asthma, chronic due to condition, anemia. Advised while ambulating, coordinate work of breathing, remove mask if needed. Recent CT hx atelectasis. No evidence of PE/effusion. IS support discussed. -HTN - states routinely normal at home. Continue tracking, advise f/u with PCP. 
-IDDM2/depression - controlled overall. -CIPN/diabetic polyneuropathy - Neurontin 
-Right knee OA/right hip pain - PT consult 
-SHABANA - CPAP compliance encouraged 
-Psychosocial - she has some fear about the future, currently very pleased with progress. She is well supported, isaac and independence remains very important. Monitor depressive sx mgmt. Discussed home life and plans for the future. Electronically signed Pierre Regalado PA-C 
3/3/2021

## 2021-03-10 NOTE — PROGRESS NOTES
[facility] - Chemotherapy Progress Note 
 
0900 Pt admit to [facility] for C8D15 Taxol ambulatory in stable condition. Assessment completed. No new concerns voiced. Port accessed with positive blood return. Labs drawn and sent for processing. IV attached to NS at [facility] Patient denies SOB, fever, cough, and/or general not feeling well. Patient denies recent exposure to someone who has tested positive for COVID-19. Patient denies contact with anyone who has a pending COVID test.  
 
Chemotherapy Flowsheet 3/10/2021 Cycle [id number] Date 3/10/2021 Drug / Regimen Taxol Dosage - Time Up - Time Down -  
Pre Hydration -  
Post Hydration -  
Pre Meds given Notes given Visit Vitals BP (!) 167/91 Pulse 80 Temp 97.3 °F (36.3 °C) Ht 5' 8\" (1.727 m) Wt 122.2 kg (269 lb 6.4 oz) LMP 09/07/2011 BMI 40.96 kg/m² Medications: 
Medications Administered 0.9% sodium chloride infusion Admin Date 
03/10/2021 Action New Bag Dose 25 mL/hr Rate 25 mL/hr Route IntraVENous Administered By Carolyn Novoa RN  
  
  
 0.9% sodium chloride injection 10 mL Admin Date 
03/10/2021 Action Given Dose 
10 mL Route IntraVENous Administered By Carolyn Novoa RN  
  
  
 dexamethasone (DECADRON) 12 mg in 0.9% sodium chloride 50 mL, overfill volume 5 mL IVPB Admin Date 
03/10/2021 Action Given Dose 
12 mg Route IntraVENous Administered By Carolyn Novoa RN  
  
  
 diphenhydrAMINE (BENADRYL) injection 50 mg   
 Admin Date 
03/10/2021 Action Given Dose 50 mg Route IntraVENous Administered By Carolyn Novoa RN  
  
  
 famotidine (PF) (PEPCID) 20 mg in 0.9% sodium chloride 10 mL injection Admin Date 
03/10/2021 Action Given Dose 
20 mg Route IntraVENous Administered By Carolyn Novoa RN  
  
  
 heparin (porcine) pf 300-500 Units Admin Date 
03/10/2021 Action Given Dose 
500 Units Route InterCATHeter Administered By Carolyn Novoa RN  
  
  
 PACLitaxeL (TAXOL) 494 mg in 0.9% sodium chloride 250 mL, overfill volume 25 mL chemo infusion Admin Date 
03/10/2021 Action New Bag Dose 
197 mg Rate 
307.8 mL/hr Route IntraVENous Administered By Corinna Villeda RN  
  
  
 palonosetron HCl (ALOXI) injection 0.25 mg   
 Admin Date 
03/10/2021 Action Given Dose 0.25 mg Route IntraVENous Administered By Corinna Villeda RN  
  
  
 saline peripheral flush soln 10 mL Admin Date 
03/10/2021 Action Given Dose 
10 mL Route InterCATHeter Administered By Corinna Villeda RN  
  
  
  
 
  
 
 
1236 Pt tolerated treatment well. Port maintained positive blood return throughout treatment, flushed with positive blood return at conclusion. D/c home ambulatory in no distress. Pt aware of next appointment scheduled for Future Appointments Date Time Provider Gabirel Phillip 3/17/2021  2:15 PM MD LUCIEN Smith BS AMB  
3/17/2021  3:30 PM [facility] VAC INITIAL [facility] BS AMB  
3/24/2021  9:00 AM G3 MELONY LONG [facility]  
3/31/2021  9:00 AM G3 MELONY LONG [facility]  
4/7/2021  9:00 AM G3 MELONY LONG [facility]  
4/14/2021  9:30 AM MD LUCIEN Smith BS AMB Recent Results (from the past 24 hour(s)) CBC WITH AUTOMATED DIFF Collection Time: 03/10/21  9:00 AM  
Result Value Ref Range WBC 8.3 3.6 - 11.0 K/uL  
 RBC 2.77 (L) 3.80 - 5.20 M/uL HGB 8.0 (L) 11.5 - 16.0 g/dL HCT 25.8 (L) 35.0 - 47.0 % MCV 93.1 80.0 - 99.0 FL  
 MCH 28.9 26.0 - 34.0 PG  
 MCHC 31.0 30.0 - 36.5 g/dL  
 RDW 19.2 (H) 11.5 - 14.5 % PLATELET 944 071 - 789 K/uL MPV 10.5 8.9 - 12.9 FL  
 NRBC 0.6 (H) 0  WBC ABSOLUTE NRBC 0.05 (H) 0.00 - 0.01 K/uL NEUTROPHILS 78 (H) 32 - 75 % LYMPHOCYTES 13 12 - 49 % MONOCYTES 7 5 - 13 % EOSINOPHILS 0 0 - 7 % BASOPHILS 0 0 - 1 % IMMATURE GRANULOCYTES 2 (H) 0.0 - 0.5 % ABS. NEUTROPHILS 6.4 1.8 - 8.0 K/UL  
 ABS. LYMPHOCYTES 1.1 0.8 - 3.5 K/UL  
 ABS.  MONOCYTES 0.6 0.0 - 1.0 K/UL  
 ABS. EOSINOPHILS 0.0 0.0 - 0.4 K/UL  
 ABS. BASOPHILS 0.0 0.0 - 0.1 K/UL  
 ABS. IMM. GRANS. 0.2 (H) 0.00 - 0.04 K/UL  
 DF AUTOMATED METABOLIC PANEL, BASIC Collection Time: 03/10/21  9:00 AM  
Result Value Ref Range Sodium 138 136 - 145 mmol/L Potassium 4.1 3.5 - 5.1 mmol/L Chloride 109 (H) 97 - 108 mmol/L  
 CO2 23 21 - 32 mmol/L Anion gap 6 5 - 15 mmol/L Glucose 181 (H) 65 - 100 mg/dL BUN 35 (H) 6 - 20 MG/DL Creatinine 1.28 (H) 0.55 - 1.02 MG/DL  
 BUN/Creatinine ratio 27 (H) 12 - 20 GFR est AA 52 (L) >60 ml/min/1.73m2 GFR est non-AA 43 (L) >60 ml/min/1.73m2  Calcium 8.4 (L) 8.5 - 10.1 MG/DL

## 2021-03-17 NOTE — PROGRESS NOTES
Pre chemo appt for C8D1 Taxol at [facility] on 3/24/2021 1. Have you been to the ER, urgent care clinic since your last visit? Hospitalized since your last visit?  no 
 
2. Have you seen or consulted any other health care providers outside of the [address] [place] [address] since your last visit? Include any pap smears or colon screening.    no

## 2021-03-17 NOTE — PROGRESS NOTES
[address] [facility], [address] [facility], [address] 
P [phone]  F [phone] Office Visit Patient ID: 
Name: Sanket Lovelace MRN: [id number] : 1960/60 y.o. Visit date: 3/18/2021 LEONID Baptiste is a 62 y.o.  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 The patient's previous treatment procedures include primary Taxol/carbo with retreatment in  and Doxil/Avastin, topotecan, gemzar and now single agent weekly Taxol initiated 18 following progression with chest wall involvement. In 2018, patient did not follow up and self-discontinued her treatment regimen. She presented to the ER on 18 with SOB. Negative workup for PE or MI. Admitted to Hospitalist service from -18. Managed for acute asthma exacerbation, acute FELICIA, and hyperkalemia. The patient was initiated on Suzzanne Locket on 19. Underwent repeat CT C/A/P on 19 for purposes of following response to treatment. Patient was tolerating Suzzanne Locket well for the first month, but then had a rise in her creatinine. However, she was also found to have hydronephrosis. Underwent right ureteral stent placement on 3/18/19. Restarted Zejula afterwards, although with continued rise in creatinine to >2. Held Suzzanne Locket again and restarted Zejula at 100mg/daily on 19. Completed 2700 cGy of radiation to her chest wall mass on 19. Held Suzzanne Locket one week in 10/2019 for blood transfusion secondary to anemia. Normal iron and B12 labs. CT C/A/P on 10/2/19 with improvement in her disease. CT C/A/P 2020 demonstrates progression. Rising Ca-125. On 2020 initiated on weekly paclitaxel. Presents today for consideration of cycle 9. CT A/P 2021 with significant improvement in her disease. She is taking iron supplementation and reports feeling well.  She is on Eliquis for a right lower extremity DVT. She has tolerated treatment well thus far, and denies complaints except for pain and swelling in her right leg. She denies pain in her chest wall or abdomen. She does have some constipation on the day of treatment, which is relieved with Miralax. Ureteral stent exchanged at the end of October. Scheduled to see Urology in February. She has not required any antibiotics recently. Denies change in appetite or bowel habits. Denies vaginal bleeding, hematuria, hematochezia, constipation, diarrhea, nausea, vomiting, CP, SOB, fevers, or chills. 
   
OncTx History: 
9/21/12: MARAH/BSO/Cytoreductive surgery on 9/21/12 noted carcinomatosis/omental caking. 10/2012-2/2013: 6 cycles Carbo/Taxol 8/2014-11/2014: 6 Cycles carbo/Taxol 3/2015-5/13/15: 3 cycles Avastin/Doxil until progression 7/2015-8/2015 Weekly Topotecan  
12/2015-3/2016: Weekly Topotecan 2/2017-9/2017Lee Rebeca D1/D8 Q28 days for 6 cycles 4/2018: CT core needle bx chest wall mass: Metastatic high-grade serous carcinoma (see comment) General Categorization Positive for malignancy. 5/8/2018-Present: Taxol  H0,6,94; S/P  Cycle #4  8/14/2018. Patient discontinued treatment secondary to side effects and fatigue. 1/23/2019 - Initiated Virgen Ohm. Held do to rising creatinine. However, she was also found to have hydronephrosis. Underwent right ureteral stent placement on 3/18/19. CT C/A/P 6/24/2020 with mixed response, but overall progression. Rising Ca-125 now 268. 
8/6/2020: Initiated weekly paclitaxel 80mg/m2 days , 8, and 15 of 28-day cycle. CT A/P 1/20/2021 with significant improvement in her disease. Imaging Review:  
CT C/A/P 1/20/2021: 
FINDINGS: 
Chest: 
LUNGS: Medial right middle lobe and minimal medial left upper lobe opacities 
favor scarring. Minimal left basilar subsegmental atelectasis versus scarring. No other significant pulmonary abnormality. No suspicious nodule or mass. The 
central airways are patent.  
LYMPH NODES: Decrease in size of bulky prevascular anterior mediastinal and 
middle mediastinal lymphadenopathy. Prevascular lymph node measures 5.0 x 1.6 cm 
(series 3, image 21), compared to 6.0 x 2.5 cm previously. Middle mediastinal 
lymph node measures 1.7 x 1.67 m (image 28), compared to 2.4 x 2.3 cm. Anterior 
cardiophrenic lymphadenopathy measures smaller measuring 3.7 cm (image 41), 
compared to 4.4 cm. PLEURAL FLUID: No pleural effusion. PERICARDIAL FLUID: No pericardial effusion. THYROID: No thyroid nodule. OTHER: Left chest port extends to the proximal right atrium. Abdomen: LIVER: Decreased bulky left hepatic low-density lesions. A representative lesion 
measures 4.2 x 1.8 cm (image 51), compared to 5.8 x 3.8 cm. A few low-density 
lesions in the right hepatic lobe are relatively stable with a representative 
lesion measuring 3.5 cm (image 60). No biliary ductal dilatation. GALLBLADDER: Decompressed without calcified stone. SPLEEN: Decreased infiltrative mass at the hilum measured 3.4 cm (image 52), 
compared to 5.8 cm previously. PANCREAS: No mass or ductal dilatation. ADRENALS: Unremarkable. KIDNEYS/URETERS: Symmetric nephrograms with mild bilateral renal cortical 
atrophy and scarring. Stable right ureteral stent with relatively unchanged mild 
to moderate right hydronephrosis. Slight increased mild left hydronephrosis of 
uncertain significance. PERITONEUM: Central mesenteric mass lesions are markedly smaller. A 
representative lesion measures 7.0 x 3.9 cm (image 66), compared to 9.9 x 5.6 
cm. Decrease in size in left peritoneal mass measuring 6.5 cm (image 57), 
compared to 7.4 cm. Retroperitoneal masses smaller with a representative lesion 
measuring 4.7 x 3.5 cm (image 79), compared to 6.5 x 4.0 cm. No significant 
ascites. COLON: No dilatation or wall thickening. APPENDIX: Not visualized SMALL BOWEL: No dilatation or wall thickening. STOMACH: Unremarkable. Pelvis: PELVIS: Decrease in pelvic lymphadenopathy and size of pelvic masses. A left 
external iliac chain lymph node measures 17 mm in short axis (image 107), 
compared to 32 mm previously. A right lower pelvic mass measures 5.3 cm in 
greatest length (image 109), compared to 7.0 cm. No pelvic free fluid. BONES: Stable appearance of the bones with sclerotic lesion in the sternum ADDITIONAL COMMENTS: N/A IMPRESSION Response to therapy with significant decrease in size of thoracic 
lymphadenopathy, liver metastases, abdominal lymphadenopathy, and pelvic 
lymphadenopathy. Slight increased mild left hydronephrosis of uncertain significance. ROS Constitutional: no weight loss, fever, night sweats Respiratory: no cough, shortness of breath, or wheezing Cardiovascular: no chest pain or dyspnea on exertion. Reports chest wall mass smaller, no associated pain. Heme: No abnormal bleeding Gastrointestinal: no abdominal pain, change in bowel habits, or black or bloody stools Genito-Urinary: no dysuria, trouble voiding, or hematuria Musculoskeletal: + swelling in ankle - bilateral, mostly only at end of day Neurological: mild neuropathy Derm: pigmentation/induration medial left leg. Prior injury from fall. Psych: positive for depression - controlled PMH: 
Past Medical History:  
Diagnosis Date  Adverse effect of anesthesia   
 sleep apnea cpap machine useage  Anemia  Arthritis DDD low back and knees  Asthma   
 uses albuterol prn only; none x 6 mos. Never hospitalized  BRCA negative 1/2013  Calculus of kidney  Chronic kidney disease   
 kidney stones  Chronic pain   
 knee pain bilat  CINV (chemotherapy-induced nausea and vomiting) 8/21/2014  Decreased hearing, right PATIENT STATES THIS IS DUE TO CHEMOTHERAPY  Diabetes ([facility] [address] Age 46 IDDM  Environmental allergies  Fibromyalgia  GERD (gastroesophageal reflux disease)   
 controlled with med  Hx of pulmonary embolus during pregnancy 2015  Hydronephrosis due to obstruction of ureter 3/18/2019  Hypertension  Ill-defined condition Sepsis  -  SOURCE PORT  
 Ill-defined condition 2016  
 chemotherapy  Mass of chest wall 2018  Morbid obesity ([address]  Murmur, heart  Other and unspecified hyperlipidemia  Ovarian cancer ([facility] [address] 2012, 2014  
 serous, high grade, stage IIIc. s/p chemotx (has port)  Psychiatric disorder Depression/Anxiety  Rectal bleeding  Thromboembolus ([address]  POST PARTUM; resolved- PELVIS  Thyroid disease HYPOTHYROID  Unspecified sleep apnea CPAP, Dr. Valentin Blanco PSH: 
Past Surgical History:  
Procedure Laterality Date Avinger  
  X2  
 HX HYSTERECTOMY  2012 ROULA/BSO, tumor debulking, omentectomy for ovarian cancer  HX ORTHOPAEDIC Right   
 ankle-FX, R  
 HX ORTHOPAEDIC Right   
 FX R ARM  
 HX UROLOGICAL Right  STENT FOR KIDNEY STONES  
 HX VASCULAR ACCESS  2015  
 right chest- port placed  HX VASCULAR ACCESS Left 2017 TETE CATH  
 [place] BREAST SURGERY PROCEDURE UNLISTED Lymph node in left breast 2017 SOC: 
Social History Socioeconomic History  Marital status:  Spouse name: Not on file  Number of children: Not on file  Years of education: Not on file  Highest education level: Not on file Occupational History  Not on file Social Needs  Financial resource strain: Not on file  Food insecurity Worry: Not on file Inability: Not on file  Transportation needs Medical: Not on file Non-medical: Not on file Tobacco Use  Smoking status: Never Smoker  Smokeless tobacco: Never Used Substance and Sexual Activity  Alcohol use: Yes Comment: 1 drink per month  Drug use: No  
 Sexual activity: Yes Lifestyle  Physical activity Days per week: Not on file Minutes per session: Not on file  Stress: Not on file Relationships  Social connections Talks on phone: Not on file Gets together: Not on file Attends Church service: Not on file Active member of club or organization: Not on file Attends meetings of clubs or organizations: Not on file Relationship status: Not on file  Intimate partner violence Fear of current or ex partner: Not on file Emotionally abused: Not on file Physically abused: Not on file Forced sexual activity: Not on file Other Topics Concern  Not on file Social History Narrative Lives in [facility] alone.  passed away in  of a heart attack. Has 2 sons. Disability. 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 Enjoys swimming and going to the gym. Family History Family History Problem Relation Age of Onset  Hypertension Mother Emmanuelle [place] Arthritis-osteo Mother  Hypertension Father  Arthritis-osteo Father  Cancer Father PROSTATE  COPD Father  Hypertension Brother  Elevated Lipids Brother  Arthritis-osteo Brother  Hypertension Brother  Elevated Lipids Brother  Obesity Brother  Cancer Brother PROSTATE  Anesth Problems Son DELAYED AWAKENING  
 Diabetes Maternal Grandmother  Anesth Problems Paternal Grandmother PATIENT STATES GRANDMOTHER'S HEART STOPPED DURING SURGERY Medications: (reviewed) Current Outpatient Medications on File Prior to Visit Medication Sig Dispense Refill  [] fluconazole (DIFLUCAN) 200 mg tablet Take 1 Tab by mouth daily for 14 days. FDA advises cautious prescribing of oral fluconazole in pregnancy. Indications: urinary tract infection due to Candida albicans fungus 14 Tab 0  
 Eliquis 5 mg tablet TAKE 1 TAB BY MOUTH TWO (2) TIMES A DAY FOR 30 DAYS. 60 Tab 2  
 amoxicillin (AMOXIL) 500 mg capsule TAKE 4 CAPSULES BY MOUTH 1 HOUR PRIOR TO APPT  gabapentin (NEURONTIN) 100 mg capsule Take 2 Caps by mouth nightly. Max Daily Amount: 200 mg. Indications: neuropathic pain 60 Cap 0  
 dexAMETHasone (DECADRON) 4 mg tablet TAKE 2 TABLETS WITH BREAKFAST THE DAY BEFORE CHEMO AND FOR 2 DAYS AFTER CHEMO 36 Tab 2  predniSONE (DELTASONE) 50 mg tablet Take 1 tablet 13 hours prior to scan, take 1 tablet 7 hours prior to scan and take 1 tablet one hour prior to scan 3 Tab 0  
 omega-3 acid ethyl esters (LOVAZA) 1 gram capsule TAKE 1 CAPSULE BY MOUTH TWICE A  Cap 2  
 rosuvastatin (CRESTOR) 10 mg tablet Take 10 mg by mouth nightly.  doxazosin (CARDURA) 1 mg tablet Take  by mouth daily.  doxazosin (CARDURA) 1 mg tablet Take 1 mg by mouth daily.  SULFAMETHOXAZOLE-TRIMETHOPRIM PO Take  by mouth two (2) times a day. 7 days  lidocaine-prilocaine (EMLA) topical cream Apply small amount over port area and cover with band aid one hour before chemo 30 g 3  
 ondansetron (ZOFRAN ODT) 4 mg disintegrating tablet Take 1 Tab by mouth every eight (8) hours as needed for Nausea. Indications: nausea and vomiting caused by cancer drugs 30 Tab 2  
 fluticasone propionate (FLONASE) 50 mcg/actuation nasal spray 2 SPRAYS IN BOTH NOSTRILS DAILY 1 Bottle 0  
 cycloSPORINE (RESTASIS) 0.05 % dpet  albuterol (PROVENTIL VENTOLIN) 2.5 mg /3 mL (0.083 %) nebu Take 2.5 mg by inhalation.  telmisartan (MICARDIS) 20 mg tablet TAKE 1 TABLET BY MOUTH EVERY DAY 90 Tab 3  
 atorvastatin (LIPITOR) 20 mg tablet  insulin aspart U-100 (NOVOLOG FLEXPEN U-100 INSULIN) 100 unit/mL (3 mL) inpn INJECT 20 UNITS BEFORE EACH MEAL + 4 UNITS FOR EVERY 50 MG/DL ABOVE 150 MG/DL.  UNITS PER DAY 90 Adjustable Dose Pre-filled Pen Syringe 3  
 LEVEMIR FLEXTOUCH U-100 INSULN 100 unit/mL (3 mL) inpn INJECT 30 UNITS IN AM AND 50 UNITS AT NIGHT. INCREASE AS DIRECTED TO  UNITS/DAY. 90 Adjustable Dose Pre-filled Pen Syringe 3  DEXILANT 60 mg CpDB capsule (delayed release) TAKE 1 CAPSULE BY MOUTH EVERY DAY 90 Cap 3  
 LINZESS 145 mcg cap capsule TAKE 1 CAPSULE BY MOUTH EVERY DAYY 30 Cap 0  
 levothyroxine (SYNTHROID) 150 mcg tablet TAKE 1 TABLET BY MOUTH EVERY DAY BEFORE BREAKFAST 90 Tab 0  
 EPINEPHrine (EPIPEN) 0.3 mg/0.3 mL injection INJECT 0.3 ML BY INTRAMUSCULAR ROUTE ONCE AS NEEDED FOR UP TO 1 DOSE.  1  
 azelastine (ASTELIN) 137 mcg (0.1 %) nasal spray 1 [place] by Both Nostrils route daily as needed. Use in each nostril as directed  insulin detemir U-100 (LEVEMIR) 100 unit/mL injection 20 Units by SubCUTAneous route daily (with lunch).  montelukast (SINGULAIR) 10 mg tablet Take 10 mg by mouth nightly.  ascorbic acid, vitamin C, (VITAMIN C) 1,000 mg tablet Take 1,000 mg by mouth daily.  albuterol (PROVENTIL VENTOLIN) 2.5 mg /3 mL (0.083 %) nebulizer solution 2.5 mg by Nebulization route every four (4) hours as needed for Wheezing.  Liraglutide (VICTOZA) 0.6 mg/0.1 mL (18 mg/3 mL) pnij 1.8 mg by SubCUTAneous route daily (with lunch). 3 Pen 3  
 SYMBICORT 160-4.5 mcg/actuation HFAA Take 2 Puffs by inhalation two (2) times daily as needed. 2 Inhaler 3  clonazePAM (KLONOPIN) 0.5 mg tablet Take 0.5 mg by mouth nightly.  sertraline (ZOLOFT) 50 mg tablet TAKE 1 TABLET BY MOUTH as needed  1  cholecalciferol, VITAMIN D3, (VITAMIN D3) 5,000 unit tab tablet Take 5,000 Units by mouth daily.  furosemide (LASIX) 40 mg tablet Take 20 mg by mouth every other day. 3 No current facility-administered medications on file prior to visit. Allergies: (reviewed) Allergies Allergen Reactions  Carboplatin Other (comments) Patient developed shortness of breath, felt faint, coughing, skin flushed and \"itchy\". This occurred on the patients 8th treatment.  Iodinated Contrast Media Rash 13 hr pre-medication prior to IV contrast.  
Patient has done well with 1 hr pre-medication of (Solu-Medrol) 40mg &  (Benadryl) 50mg.  Lipitor [Atorvastatin] Myalgia  Shellfish Containing Products Hives  Tape [Adhesive] Itching and Other (comments) \"op site-- clear,thin tape\"--caused blister  Tomato Hives  Iodine And Iodide Containing Products Rash  Olmesartan Other (comments)  Losartan Other (comments)  
  headaches  Percocet [Oxycodone-Acetaminophen] Other (comments) Fever; however, current home med OBJECTIVE *deferred today given video-conference visit for ongoing COVID-19 pandemic* Physical Exam 
Visit Vitals /70 (BP 1 Location: Left arm, BP Patient Position: Sitting) Pulse 86 Temp (!) 95.6 °F (35.3 °C) (Temporal) Ht 5' 8\" (1.727 m) Wt 275 lb (124.7 kg) LMP 09/07/2011 BMI 41.81 kg/m² Physical Exam: 
General: Alert and oriented. No acute distress. Well-nourished HEENT: No thyroid enlargment. Neck supple without restrictions. Sclera normal. Normal occular motion. Moist mucous membranes. Lymphatics: No evidence of axillary, cervical, or subclavicular adenopathy. Respiratory: clear to auscultation and percussion to the bases. No CVAT. Chest wall mass is still palpable along the superior sternum at the level of the superior aspect of the breasts, but improved. Normal overlying skin Cardiovascular: regular rate and rhythm. No murmurs, rubs, or gallops. Gastrointestinal: soft, non-tender, non-distended, no masses or organomegaly. Well-healed incision. Musculoskeletal: normal gait. No joint tenderness, deformity or swelling. No muscular tenderness. Extremities: extremities normal, atraumatic, mild 1+ edema bilaterally. Pelvic: deferred today Neuro: Grossly intact. Normal gait and movement. No acute deficit Skin: No evidence of rashes or skin changes. DATE REVIEW as available: 
Lab Results Component Value Date/Time  WBC 8.3 03/10/2021 09:00 AM  
 Hemoglobin (POC) 10.5 (L) 03/20/2017 12:15 PM  
 HGB 8.0 (L) 03/10/2021 09:00 AM  
 Hematocrit (POC) 31 (L) 03/20/2017 12:15 PM  
 HCT 25.8 (L) 03/10/2021 09:00 AM  
 PLATELET 258 03/10/2021 09:00 AM  
 MCV 93.1 03/10/2021 09:00 AM  
 
Lab Results  
Component Value Date/Time  
 ABS. NEUTROPHILS 6.4 03/10/2021 09:00 AM  
 
Lab Results  
Component Value Date/Time  
 Sodium 138 03/10/2021 09:00 AM  
 Potassium 4.1 03/10/2021 09:00 AM  
 Chloride 109 (H) 03/10/2021 09:00 AM  
 CO2 23 03/10/2021 09:00 AM  
 Anion gap 6 03/10/2021 09:00 AM  
 Glucose 181 (H) 03/10/2021 09:00 AM  
 BUN 35 (H) 03/10/2021 09:00 AM  
 Creatinine 1.28 (H) 03/10/2021 09:00 AM  
 BUN/Creatinine ratio 27 (H) 03/10/2021 09:00 AM  
 GFR est AA 52 (L) 03/10/2021 09:00 AM  
 GFR est non-AA 43 (L) 03/10/2021 09:00 AM  
 Calcium 8.4 (L) 03/10/2021 09:00 AM  
 Bilirubin, total 0.5 03/03/2021 09:21 AM  
 Alk. phosphatase 66 03/03/2021 09:21 AM  
 Protein, total 7.1 03/03/2021 09:21 AM  
 Albumin 3.3 (L) 03/03/2021 09:21 AM  
 Globulin 3.8 03/03/2021 09:21 AM  
 A-G Ratio 0.9 (L) 03/03/2021 09:21 AM  
 ALT (SGPT) 35 03/03/2021 09:21 AM  
 
Ca-125: 
6/11/2020: 268 
9/3/2020: 187 
11/2020; 52 
12/3/2020: 48 
12/29/2020: 46: 
1/27/2020: 37 
2/24/2020: 36 
 
 
ECHO 7/17/18 
Left ventricle: Systolic function was normal. Ejection fraction was estimated in the range of 55 % to 60 %. There were no regional wall motion 
abnormalities. Wall thickness was mildly increased. 
Left atrium: The atrium was mildly dilated. 
Mitral valve: There was mild regurgitation. 
Aortic valve: Leaflets exhibited sclerosis without stenosis. Not well visualized. 
 
 
IMPRESSION AND PLAN: 
Ms. Cathleen Moore is a 60 y.o. female with recurrent, metastatic ovarian cancer. Heavily pre-treated. Lost to follow-up since 9/2018, at which time patient self-discontinued weekly paclitaxel. Initiated Zejula on 1/23/19. Held after 1 month secondary to rising creatinine. Found to have right hydronephrosis. Right ureteral stent placed 3/18/19 with normalization of creatinine. Completed 2700 cGy to the sternun 7/11/19. Restarted Zejula at 100mg/daily on 4/23/19.  Now with rising Ca-125 and progression on CT 6/24/2020. Initiated on weekly Taxol 80mg/m2 days 1, 8, and 15 in a 28-day cycle on 8/6/2020. Problem List Items Addressed This Visit Endocrine Ovarian cancer ([facility] [address] - Primary Immune Lymphadenopathy, mediastinal  
  
 Respiratory SHABANA on CPAP Other Carcinomatosis ([address] Morbid obesity ([address] Port-A-Cath in place On antineoplastic chemotherapy Reviewed patient's course to date. Presents today for consideration of cycle 9 weekly paclitaxel. Recent RLE DVT. Remains on Eliquis. Patient to remain on Eliquis indefinitely given her active ongoing treatment of her cancer. Tolerated cycle 1-8 well. Her Ca-125 is responding to treatment nicely. CT C/A/P 1/20/2021 with significant improvement in disease burden. Will follow-up pre-chemo labs. Plan to continue treatment for as long as Ca-125 continues to respond. Given that she is responding so nicely, I would like to continue treatment with paclitaxel for as long as possible. She is tolerating treatment well. May consider maintenance PARPi in the future with either olaparib or rucaparib, although the use of PARPi after prior PARPi is not well studied, although what is out there is promising. She remains anemic, which is certainly related to her prior radiation and chemotherapy and her underlying medical comorbid conditions. Will plan to transfuse as needed if symptomatic. She is heavily pre-treated and her bone marrow reserve remains low. Continue iron supplementation. Urology is managing her stent and recurrent UTIs. Has not required antibiotics for some time. Stent exchanged on 10/30/2020 per patient. F/u in 2/2021. The patient has underlying diabetes and neuropathy, which is actually improved right now per the patient's report. I expressed my concerns that we will need to watch this closely while on weekly Taxol, as this may certainly worsen her diabetic neuropathy. All questions and concerns were addressed with the patient and she is comfortable with the plan. An electronic signature was used to authenticate this note.   
 
Polly Mead MD

## 2021-03-24 NOTE — PROGRESS NOTES
[address] [facility], [address] [place], [address] 
P [phone]  F [phone] Office Visit Patient ID: 
Name: Della Lacey MRN: [id number] : 1960/60 y.o. Visit date: 3/24/2021 LEONID Guardado is an established patient 62 y.o.  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 The patient's previous treatment include primary debulking and adjuvant Taxol/carbo with retreatment in  for recurrence. Subsequent lines of chemo as outlined below. She has most recently started on retrial of single agent weekly Taxol in 2020 following progression with chest wall involvement while on PARPi. Most recent EOD imaging in  showed overall response to therapy. She is s/p R ureteral stenting in situ.   
   
 
OncTx History: 
12: MARAH/BSO/Cytoreductive surgery on 12 noted carcinomatosis/omental caking. 10/2012-2013: 6 cycles Carbo/Taxol 2014-2014: 6 Cycles carbo/Taxol 3/2015-5/13/15: 3 cycles Avastin/Doxil until progression 2015-2015 Weekly Topotecan  
2015-3/2016: Weekly Topotecan 2017-2017Jerl Aliment D1/D8 Q28 days for 6 cycles 2018: CT core needle bx chest wall mass: Metastatic high-grade serous carcinoma (see comment) Positive for malignancy. 2018-2018: Taxol weekly 2019: 10 fx 30cGy palliative RT to sternal lesion 2019 - 2020 [place]. Underwent right ureteral stent placement on 3/18/19. 
2020: CT CAP variable response to therapy with several lesions decreased in size. However a majority of the lesions have increased in size consistent with progression of disease with involvement of the mediastinum, liver masses, and peritoneal carcinomatosis. Increase right-sided hydronephrosis. Increasing . 
2020 - initiated Taxol weekly 10/2020 - RLE DVT on Eliquis 2021: CT CAP Response to therapy with significant decrease in size of thoracic lymphadenopathy, liver metastases, abdominal lymphadenopathy, and pelvic lymphadenopathy. Slight increased mild left hydronephrosis of uncertain significance. CT Results (most recent): 
Results from [facility] Encounter encounter on 01/20/21 CT ABD PELV W CONT Narrative INDICATION:   Right ovarian cancer EXAM:  CT chest, abdomen, and pelvis WITH  CONTRAST 
 
COMPARISON:  CT chest abdomen and pelvis 6/24/2020 TECHNIQUE:  Thin collimation axial images were obtained through the chest, 
abdomen, and pelvis with IV contrast administration. Coronal and sagittal 
reconstructions were obtained. Oral contrast was administered. CT dose 
reduction was achieved through use of a standardized protocol tailored for this 
examination and automatic exposure control for dose modulation. FINDINGS: 
 
Chest: 
 
LUNGS: Medial right middle lobe and minimal medial left upper lobe opacities 
favor scarring. Minimal left basilar subsegmental atelectasis versus scarring. No other significant pulmonary abnormality. No suspicious nodule or mass. The 
central airways are patent. LYMPH NODES: Decrease in size of bulky prevascular anterior mediastinal and 
middle mediastinal lymphadenopathy. Prevascular lymph node measures 5.0 x 1.6 cm 
(series 3, image 21), compared to 6.0 x 2.5 cm previously. Middle mediastinal 
lymph node measures 1.7 x 1.67 m (image 28), compared to 2.4 x 2.3 cm. Anterior 
cardiophrenic lymphadenopathy measures smaller measuring 3.7 cm (image 41), 
compared to 4.4 cm. PLEURAL FLUID: No pleural effusion. PERICARDIAL FLUID: No pericardial effusion. THYROID: No thyroid nodule. OTHER: Left chest port extends to the proximal right atrium. Abdomen: LIVER: Decreased bulky left hepatic low-density lesions. A representative lesion 
measures 4.2 x 1.8 cm (image 51), compared to 5.8 x 3.8 cm.  A few low-density 
lesions in the right hepatic lobe are relatively stable with a representative 
lesion measuring 3.5 cm (image 60). No biliary ductal dilatation. GALLBLADDER: Decompressed without calcified stone. SPLEEN: Decreased infiltrative mass at the hilum measured 3.4 cm (image 52), 
compared to 5.8 cm previously. PANCREAS: No mass or ductal dilatation. ADRENALS: Unremarkable. KIDNEYS/URETERS: Symmetric nephrograms with mild bilateral renal cortical 
atrophy and scarring. Stable right ureteral stent with relatively unchanged mild 
to moderate right hydronephrosis. Slight increased mild left hydronephrosis of 
uncertain significance. PERITONEUM: Central mesenteric mass lesions are markedly smaller. A 
representative lesion measures 7.0 x 3.9 cm (image 66), compared to 9.9 x 5.6 
cm. Decrease in size in left peritoneal mass measuring 6.5 cm (image 57), 
compared to 7.4 cm. Retroperitoneal masses smaller with a representative lesion 
measuring 4.7 x 3.5 cm (image 79), compared to 6.5 x 4.0 cm. No significant 
ascites. COLON: No dilatation or wall thickening. APPENDIX: Not visualized SMALL BOWEL: No dilatation or wall thickening. STOMACH: Unremarkable. Pelvis: PELVIS: Decrease in pelvic lymphadenopathy and size of pelvic masses. A left 
external iliac chain lymph node measures 17 mm in short axis (image 107), 
compared to 32 mm previously. A right lower pelvic mass measures 5.3 cm in 
greatest length (image 109), compared to 7.0 cm. No pelvic free fluid. BONES: Stable appearance of the bones with sclerotic lesion in the sternum ADDITIONAL COMMENTS: N/A Impression Response to therapy with significant decrease in size of thoracic 
lymphadenopathy, liver metastases, abdominal lymphadenopathy, and pelvic 
lymphadenopathy. Slight increased mild left hydronephrosis of uncertain significance. SUBJECTIVE 3/24/2021: 
-Still issues with UTI, hematuria on new abx from PCP on Macrobid. Hematuria resolved. No F/C, dysuria.  Last cystoscopy with stent exchange in October 
-Remains fatigued, SOB.   
-Right hip pain worse. Having to use cane/walker. Referral for PT placed last visit, states no one called her. PCP made referral again. -C/o bilat leg swelling. Pulmonary has increased her diuretic. 
-No N/V. Appetite stable. +BM daily to every other day with miralax. Takes tramadol nightly for generalized discomforts. 
-She has chronic pedal/hand numbness/tingling due to taxol and DM, PM pain. Seen by her diabetic provider, A1C ~5.8 per patient. States glucose was >400 last PM.   
-Denies any change in depression, continues medication. Two adult sons at home who cook for her. Previously fostered 2 young boys she was going to adopt, they have since transitioned to new homes. She is trying to focus on herself and health. ROS 10 pt negative except as per above PMH: 
Past Medical History:  
Diagnosis Date  Adverse effect of anesthesia   
 sleep apnea cpap machine useage  Anemia  Arthritis DDD low back and knees  Asthma   
 uses albuterol prn only; none x 6 mos. Never hospitalized  BRCA negative 1/2013  Calculus of kidney  Chronic kidney disease   
 kidney stones  Chronic pain   
 knee pain bilat  CINV (chemotherapy-induced nausea and vomiting) 8/21/2014  Decreased hearing, right PATIENT STATES THIS IS DUE TO CHEMOTHERAPY  Diabetes ([address] Age 46 IDDM  Environmental allergies  Fibromyalgia  GERD (gastroesophageal reflux disease)   
 controlled with med  Hx of pulmonary embolus during pregnancy 1/18/2015  Hydronephrosis due to obstruction of ureter 3/18/2019  Hypertension  Ill-defined condition Sepsis 9-2015 -  SOURCE PORT  
 Ill-defined condition 2016  
 chemotherapy  Mass of chest wall 05/2018  Morbid obesity ([address]  Murmur, heart  Other and unspecified hyperlipidemia  Ovarian cancer ([address] 9/2012, 1/2014  
 serous, high grade, stage IIIc. s/p chemotx (has port)  Psychiatric disorder Depression/Anxiety  Rectal bleeding  Thromboembolus [address] [address]  POST PARTUM; resolved- PELVIS  Thyroid disease HYPOTHYROID  Unspecified sleep apnea CPAP, Dr. Jolene Menjivar PSH: 
Past Surgical History:  
Procedure Laterality Date   
  X2  
 HX HYSTERECTOMY  2012 ROULA/BSO, tumor debulking, omentectomy for ovarian cancer  HX ORTHOPAEDIC Right   
 ankle-FX, R  
 HX ORTHOPAEDIC Right   
 FX R ARM  
 HX UROLOGICAL Right  STENT FOR KIDNEY STONES  
 HX VASCULAR ACCESS  2015  
 right chest- port placed  HX VASCULAR ACCESS Left  TETE CATH  
 [place] BREAST SURGERY PROCEDURE UNLISTED Lymph node in left breast 2017 SOC: 
Social History Socioeconomic History  Marital status:  Spouse name: Not on file  Number of children: Not on file  Years of education: Not on file  Highest education level: Not on file Occupational History  Not on file Social Needs  Financial resource strain: Not on file  Food insecurity Worry: Not on file Inability: Not on file  Transportation needs Medical: Not on file Non-medical: Not on file Tobacco Use  Smoking status: Never Smoker  Smokeless tobacco: Never Used Substance and Sexual Activity  Alcohol use: Yes Comment: 1 drink per month  Drug use: No  
 Sexual activity: Yes Lifestyle  Physical activity Days per week: Not on file Minutes per session: Not on file  Stress: Not on file Relationships  Social connections Talks on phone: Not on file Gets together: Not on file Attends Catholic service: Not on file Active member of club or organization: Not on file Attends meetings of clubs or organizations: Not on file Relationship status: Not on file  Intimate partner violence Fear of current or ex partner: Not on file Emotionally abused: Not on file Physically abused: Not on file Forced sexual activity: Not on file Other Topics Concern  Not on file Social History Narrative Lives in [facility] alone.  passed away in 5/16 of a heart attack. Has 2 sons. Disability. 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 Enjoys swimming and going to the gym. Family History Family History Problem Relation Age of Onset  Hypertension Mother Duffy Arthritis-osteo Mother  Hypertension Father  Arthritis-osteo Father  Cancer Father PROSTATE  COPD Father  Hypertension Brother  Elevated Lipids Brother  Arthritis-osteo Brother  Hypertension Brother  Elevated Lipids Brother  Obesity Brother  Cancer Brother PROSTATE  Anesth Problems Son DELAYED AWAKENING  
 Diabetes Maternal Grandmother  Anesth Problems Paternal Grandmother PATIENT STATES GRANDMOTHER'S HEART STOPPED DURING SURGERY Medications: (reviewed) Current Outpatient Medications on File Prior to Encounter Medication Sig Dispense Refill  Eliquis 5 mg tablet TAKE 1 TAB BY MOUTH TWO (2) TIMES A DAY FOR 30 DAYS. 60 Tab 2  
 amoxicillin (AMOXIL) 500 mg capsule TAKE 4 CAPSULES BY MOUTH 1 HOUR PRIOR TO APPT  gabapentin (NEURONTIN) 100 mg capsule Take 2 Caps by mouth nightly. Max Daily Amount: 200 mg. Indications: neuropathic pain 60 Cap 0  
 dexAMETHasone (DECADRON) 4 mg tablet TAKE 2 TABLETS WITH BREAKFAST THE DAY BEFORE CHEMO AND FOR 2 DAYS AFTER CHEMO 36 Tab 2  predniSONE (DELTASONE) 50 mg tablet Take 1 tablet 13 hours prior to scan, take 1 tablet 7 hours prior to scan and take 1 tablet one hour prior to scan 3 Tab 0  
 omega-3 acid ethyl esters (LOVAZA) 1 gram capsule TAKE 1 CAPSULE BY MOUTH TWICE A  Cap 2  
 rosuvastatin (CRESTOR) 10 mg tablet Take 10 mg by mouth nightly.  doxazosin (CARDURA) 1 mg tablet Take  by mouth daily.     
 doxazosin (CARDURA) 1 mg tablet Take 1 mg by mouth daily.  SULFAMETHOXAZOLE-TRIMETHOPRIM PO Take  by mouth two (2) times a day. 7 days  lidocaine-prilocaine (EMLA) topical cream Apply small amount over port area and cover with band aid one hour before chemo 30 g 3  
 ondansetron (ZOFRAN ODT) 4 mg disintegrating tablet Take 1 Tab by mouth every eight (8) hours as needed for Nausea. Indications: nausea and vomiting caused by cancer drugs 30 Tab 2  
 fluticasone propionate (FLONASE) 50 mcg/actuation nasal spray 2 SPRAYS IN BOTH NOSTRILS DAILY 1 Bottle 0  
 cycloSPORINE (RESTASIS) 0.05 % dpet  albuterol (PROVENTIL VENTOLIN) 2.5 mg /3 mL (0.083 %) nebu Take 2.5 mg by inhalation.  telmisartan (MICARDIS) 20 mg tablet TAKE 1 TABLET BY MOUTH EVERY DAY 90 Tab 3  
 atorvastatin (LIPITOR) 20 mg tablet  insulin aspart U-100 (NOVOLOG FLEXPEN U-100 INSULIN) 100 unit/mL (3 mL) inpn INJECT 20 UNITS BEFORE EACH MEAL + 4 UNITS FOR EVERY 50 MG/DL ABOVE 150 MG/DL.  UNITS PER DAY 90 Adjustable Dose Pre-filled Pen Syringe 3  
 LEVEMIR FLEXTOUCH U-100 INSULN 100 unit/mL (3 mL) inpn INJECT 30 UNITS IN AM AND 50 UNITS AT NIGHT. INCREASE AS DIRECTED TO  UNITS/DAY. 90 Adjustable Dose Pre-filled Pen Syringe 3  DEXILANT 60 mg CpDB capsule (delayed release) TAKE 1 CAPSULE BY MOUTH EVERY DAY 90 Cap 3  
 LINZESS 145 mcg cap capsule TAKE 1 CAPSULE BY MOUTH EVERY DAYY 30 Cap 0  
 levothyroxine (SYNTHROID) 150 mcg tablet TAKE 1 TABLET BY MOUTH EVERY DAY BEFORE BREAKFAST 90 Tab 0  
 EPINEPHrine (EPIPEN) 0.3 mg/0.3 mL injection INJECT 0.3 ML BY INTRAMUSCULAR ROUTE ONCE AS NEEDED FOR UP TO 1 DOSE.  1  
 azelastine (ASTELIN) 137 mcg (0.1 %) nasal spray 1 [place] by Both Nostrils route daily as needed. Use in each nostril as directed  insulin detemir U-100 (LEVEMIR) 100 unit/mL injection 20 Units by SubCUTAneous route daily (with lunch).  montelukast (SINGULAIR) 10 mg tablet Take 10 mg by mouth nightly.     
 ascorbic acid, vitamin C, (VITAMIN C) 1,000 mg tablet Take 1,000 mg by mouth daily.  albuterol (PROVENTIL VENTOLIN) 2.5 mg /3 mL (0.083 %) nebulizer solution 2.5 mg by Nebulization route every four (4) hours as needed for Wheezing.  Liraglutide (VICTOZA) 0.6 mg/0.1 mL (18 mg/3 mL) pnij 1.8 mg by SubCUTAneous route daily (with lunch). 3 Pen 3  
 SYMBICORT 160-4.5 mcg/actuation HFAA Take 2 Puffs by inhalation two (2) times daily as needed. 2 Inhaler 3  clonazePAM (KLONOPIN) 0.5 mg tablet Take 0.5 mg by mouth nightly.  sertraline (ZOLOFT) 50 mg tablet TAKE 1 TABLET BY MOUTH as needed  1  cholecalciferol, VITAMIN D3, (VITAMIN D3) 5,000 unit tab tablet Take 5,000 Units by mouth daily.  furosemide (LASIX) 40 mg tablet Take 20 mg by mouth every other day. 3 No current facility-administered medications on file prior to encounter. Allergies: (reviewed) Allergies Allergen Reactions  Carboplatin Other (comments) Patient developed shortness of breath, felt faint, coughing, skin flushed and \"itchy\". This occurred on the patients 8th treatment.  Iodinated Contrast Media Rash 13 hr pre-medication prior to IV contrast.  
Patient has done well with 1 hr pre-medication of (Solu-Medrol) 40mg &  (Benadryl) 50mg.  Lipitor [Atorvastatin] Myalgia  Shellfish Containing Products Hives  Tape [Adhesive] Itching and Other (comments) \"op site-- clear,thin tape\"--caused blister  Tomato Hives  Iodine And Iodide Containing Products Rash  Olmesartan Other (comments)  Losartan Other (comments)  
  headaches  Percocet [Oxycodone-Acetaminophen] Other (comments) Fever; however, current home med OBJECTIVE Physical Exam 
Visit Vitals /73 (BP 1 Location: Left upper arm, BP Patient Position: Sitting) Pulse 68 Temp 97.8 °F (36.6 °C) Resp 18 Ht 5' 8\" (1.727 m) Wt 270 lb 6.4 oz (122.7 kg) LMP 09/07/2011 BMI 41.11 kg/m² GENERAL BRIAN: Conversant, pleasant, alert, oriented. NAD HEENT: Oropharynx clear. Sclera anicteric. No JVD. No adenopathy CV: RRR  
RESPIRATORY: CTA bilat GASTROINT: soft, non-tender, without masses or organomegaly, no fluid wave. Habitus limits exam. Previous RUQ nodule gone EXTREMITIES: bilat 1+ edema/girth pitting. Nontender here. Feet warm, mobile, sensate. ALCON SURVEY: Negative NEURO: Grossly intact, no acute deficit. Oriented. Not depressed. Not agitated. Wt Readings from Last 3 Encounters:  
03/24/21 270 lb 6.4 oz (122.7 kg) 03/17/21 275 lb (124.7 kg) 03/10/21 269 lb 6.4 oz (122.2 kg) DATE REVIEW as available: 
Lab Results Component Value Date/Time WBC 6.2 03/24/2021 09:23 AM  
 Hemoglobin (POC) 10.5 (L) 03/20/2017 12:15 PM  
 HGB 6.6 (L) 03/24/2021 09:23 AM  
 Hematocrit (POC) 31 (L) 03/20/2017 12:15 PM  
 HCT 21.6 (L) 03/24/2021 09:23 AM  
 PLATELET 707 64/40/0407 09:23 AM  
 MCV 96.0 03/24/2021 09:23 AM  
 
Lab Results Component Value Date/Time  
 ABS. NEUTROPHILS 4.1 03/24/2021 09:23 AM  
 
Lab Results Component Value Date/Time Sodium 141 03/24/2021 09:23 AM  
 Potassium 3.8 03/24/2021 09:23 AM  
 Chloride 111 (H) 03/24/2021 09:23 AM  
 CO2 22 03/24/2021 09:23 AM  
 Anion gap 8 03/24/2021 09:23 AM  
 Glucose 214 (H) 03/24/2021 09:23 AM  
 BUN 25 (H) 03/24/2021 09:23 AM  
 Creatinine 1.29 (H) 03/24/2021 09:23 AM  
 BUN/Creatinine ratio 19 03/24/2021 09:23 AM  
 GFR est AA 51 (L) 03/24/2021 09:23 AM  
 GFR est non-AA 42 (L) 03/24/2021 09:23 AM  
 Calcium 9.0 03/24/2021 09:23 AM  
 Bilirubin, total 0.6 03/24/2021 09:23 AM  
 Alk. phosphatase 67 03/24/2021 09:23 AM  
 Protein, total 7.4 03/24/2021 09:23 AM  
 Albumin 3.0 (L) 03/24/2021 09:23 AM  
 Globulin 4.4 (H) 03/24/2021 09:23 AM  
 A-G Ratio 0.7 (L) 03/24/2021 09:23 AM  
 ALT (SGPT) 34 03/24/2021 09:23 AM  
 
Lab Results Component Value Date/Time  Hemoglobin A1c 5.8 (H) 03/28/2019 09:40 AM  
 Hemoglobin A1c (POC) 6.9 (A) 10/15/2015 12:00 PM  
 
 CA-125 Date Value Ref Range Status 03/24/2021 43 (H) 1.5 - 35.0 U/mL Final  
  Comment:  
  ** Note new reference range and method ** Results may vary depending on . Method used is [employer] 
  
02/24/2021 36 (H) 1.5 - 35.0 U/mL Final  
  Comment:  
  ** Note new reference range and method ** Results may vary depending on . Method used is [employer] 
  
01/27/2021 37 (H) 1.5 - 35.0 U/mL Final  
  Comment:  
  ** Note new reference range and method ** Results may vary depending on . Method used is [employer] 
  
12/29/2020 46 (H) 1.5 - 35.0 U/mL Final  
  Comment:  
  ** Note new reference range and method ** Results may vary depending on . Method used is [employer] 
  
12/03/2020 48 (H) 1.5 - 35.0 U/mL Final  
  Comment:  
  ** Note new reference range and method ** Results may vary depending on . Method used is [employer] CT 6/2020: 
FINDINGS:  
THYROID: No nodule. MEDIASTINUM: Left paratracheal adenopathy now measures 3.7 x 2.0 cm increased in 
size. Mass in AP window which extends superiorly into the left infraclavicular 
region and inferiorly into the left hilum is also increase in size. When 
measured in a similar fashion this measures 3.5 x 2.4 previously 3.0 x 2.9 cm. Right parasternal mass is decreased in size measuring 3.4 x 3.4 cm. Subcarinal 
adenopathy measures 2.8 x 2.3 cm increased in size. There is increased size of 
the nodular component adjacent to the xiphoid now measuring 2.6 x 3.3 cm. SWATI: Left hilar lymph node measures 3.2 x 2.4 cm previously 2.5 x 1.8 cm. THORACIC AORTA: No dissection or aneurysm. MAIN PULMONARY ARTERY: Normal in caliber. TRACHEA/BRONCHI: Patent. ESOPHAGUS: No wall thickening or dilatation. HEART: Normal in size. PLEURA: No effusion or pneumothorax. LUNGS: No nodule, mass, or airspace disease.  
LIVER: Multiple masses are noted and better visualized on current study 
secondary to the addition of intravenous contrast. Comparison lesions measure 
previously, there is a 3.5 x 2.8 cm mass in the right hepatic lobe series 3 
image 58 slightly increased in size. There is a 3.6 x 2.9 cm mass in the left 
hepatic lobe slightly decreased in size series 3 image 56. Left hepatic lobe 
mass series 3 image 45 measuring 4.0 x 2.8 cm slightly increased in size. There 
is extensive tumor infiltration in the liver hilum with narrowing of the main 
portal vein as well as the intrahepatic portal veins. The common hepatic artery 
is not well visualized and likely occluded with some collateral flow noted in 
the liver hilum. GALLBLADDER: Not visualized SPLEEN: Mass in the splenic hilum increased in size measuring 7.4 x 4.3 cm with 
invasion into the splenic hilum. PANCREAS: The pancreas is encased by tumor. ADRENALS: There is likely involvement of the left adrenal gland tumor. KIDNEYS: Right nephroureteral stent with mild to moderate right-sided 
hydronephrosis. STOMACH: Tumor encasing the gastric antrum. SMALL BOWEL: No dilatation or wall thickening. COLON: Tumor abutting the tumor encasing the rectosigmoid junction. Tumor 
abutting the transverse colon. APPENDIX: Unremarkable. PERITONEUM: Some free fluid noted in the pelvis. Increased size of mesenteric 
masses. Representative examples: 7.8 x 4.3 cm tumor anterior peritoneum series 3 
image 84. No pelvic tumor 7.5 x 4.8 cm series 3 image 100. RETROPERITONEUM:  
Right pelvic sidewall increased in size. Large right lateral lymph node 
measuring 3.1 x 2.0 cm not significantly changed. REPRODUCTIVE ORGANS: The visualized URINARY BLADDER: No mass or calculus. BONES: No destructive bone lesion. ADDITIONAL COMMENTS: N/A 
  
IMPRESSION IMPRESSION: 
1. There is some variable response to therapy with several lesions decreased in 
size.  However a majority of the lesions have increased in size consistent with 
progression of disease with involvement of the mediastinum, liver masses, and 
peritoneal carcinomatosis. 2.  Increase right-sided hydronephrosis. Fariha Magic ECHO 1/2020 · Normal cavity size and systolic function (ejection fraction normal). Mild concentric hypertrophy. Estimated left ventricular ejection fraction is 55 - 60%. Visually measured ejection fraction. No regional wall motion abnormality noted. · Mildly dilated left atrium. · Mild aortic valve sclerosis. · Trace mitral valve regurgitation is present. IMPRESSION AND PLAN: 
62 y.o. with diffusely metastatic ovarian cancer. Continues on palliative chemo, retrial taxol Labs/chart reviewed 
-Metastatic ovarian cancer: C9 Taxol.  up this cycle 
-CKD stage 2-3: stable. Follows with nephrology. 
-Right hydronephrosis/dysuria/hematuria: stent in place. Exchanged October 2020. Recurrent UTIs. Encouraged to discuss with her urologist and PCP suppressive therapy. Noted new mild left hydro on last CT. Compensated.  
-Liver mets: asx 
-RLE DVT acute/chronic: on NOAC. Improved on recent US 
-Anemia d/t disease/chemo/hematuria:  symptomatic. Transfuse 1 unit PRBCs today. -ECKERT/debility: Hx Asthma, anemia, chronic due to condition. Advised while ambulating, coordinate work of breathing, remove mask if needed, continue inhaler. Recent CT hx atelectasis. No evidence of PE/effusion. IS support discussed. -HTN: states routinely normal at home. Continue tracking, advise f/u with PCP. 
-IDDM2: follows with PCP. Advised to f/u with PCP for better control. 
-Depression: controlled. -CIPN/diabetic polyneuropathy: stable on neurontin 
-Right knee OA/right hip pain: PT consulted again by PCP 
-SHABANA: CPAP compliance encouraged 
-Psychosocial: She has some fear about the future, currently very pleased with progress. She is well supported, isaac and independence remain very important. Monitor depressive sx mgmt.  Discussed home life and plans for the future. On 3/24/2021 I have spent 35 minutes reviewing course, chart notes, pertinent test results, face to face with the patient discussing the aforementioned items, diagnosis and importance of compliance with the treatment plan. Electronically signed Logan Barros PA-C

## 2021-03-24 NOTE — PROGRESS NOTES
[facility] Chemo Progress Note Date: March 24, 2021 
 
 
0915 Ms. Meryl Hector Arrived to [facility] for  C9D1 Taxol ambulatory with cane in stable condition. Assessment was completed. Reporting pain to right hip and increased swelling to RLE. Slight increase in numbness to bilateral feet reported. Improvement noted to urinary pain and frequency with UTI, still finishing antibiotics. Port accessed with positive blood return. Labs drawn and sent for processing. Patient denies any symptoms of COVID-19, including SOB, coughing, fever, or generally not feeling well. Patient denies any recent exposure to COVID-19. Patient denies any recent contact with family or friends that have a pending COVID-19 test. 
 
1150 Labs reviewed. Criteria for treatment was met. Hgb = 6.6, 1 unit of pRBCs ordered for transfusion today. Ms. Bronson Layne vitals were reviewed. Patient Vitals for the past 12 hrs: 
 Temp Pulse Resp BP  
03/24/21 1653 97.9 °F (36.6 °C) (!) 58 18 134/68  
03/24/21 1625 97.9 °F (36.6 °C) 78 18 (!) 142/80  
03/24/21 1525 97.7 °F (36.5 °C) 82 18 127/76  
03/24/21 1455 97.4 °F (36.3 °C) 77 18 122/73  
03/24/21 1440 97.7 °F (36.5 °C) 71 18 128/76  
03/24/21 1422 97.8 °F (36.6 °C) 68 18 123/73  
03/24/21 0919 (!) 96 °F (35.6 °C) 98 18 (!) 159/83 Pre-medications  were administered as ordered and chemotherapy was initiated. Medications Administered 0.9% sodium chloride infusion Admin Date 
03/24/2021 Action New Bag Dose 25 mL/hr Rate 25 mL/hr Route IntraVENous Administered By 
Sophia López  
  
  
 0.9% sodium chloride infusion 250 mL Admin Date 
03/24/2021 Action New Bag Dose 
250 mL Rate 15 mL/hr Route IntraVENous Administered By 
Sophia López  
  
  
 0.9% sodium chloride injection 10 mL Admin Date 
03/24/2021 Action Given Dose 
10 mL Route IntraVENous Administered By 
Sophia López  
  
  
 dexamethasone (DECADRON) 12 mg in 0.9% sodium chloride 50 mL, overfill volume 5 mL IVPB Admin Date 
03/24/2021 Action Given Dose 
12 mg Rate 232 mL/hr Route IntraVENous Administered By 
Remy Wheat  
  
  
 diphenhydrAMINE (BENADRYL) injection 50 mg   
 Admin Date 
03/24/2021 Action Given Dose 50 mg Route IntraVENous Administered By 
Remy Wheat  
  
  
 famotidine (PF) (PEPCID) 20 mg in 0.9% sodium chloride 10 mL injection Admin Date 
03/24/2021 Action Given Dose 
20 mg Route IntraVENous Administered By 
Remy Wheat  
  
  
 heparin (porcine) pf 300-500 Units Admin Date 
03/24/2021 Action Given Dose 
500 Units Route InterCATHeter Administered By 
Remy Wheat PACLitaxeL (TAXOL) 197 mg in 0.9% sodium chloride 250 mL, overfill volume 25 mL chemo infusion Admin Date 
03/24/2021 Action New Bag Dose 
197 mg Rate 
307.8 mL/hr Route IntraVENous Administered By 
Remy Wheat  
  
  
 palonosetron HCl (ALOXI) injection 0.25 mg   
 Admin Date 
03/24/2021 Action Given Dose 0.25 mg Route IntraVENous Administered By 
Remy Wheat 1415: Patient tolerated treatment well. Port maintained positive blood return throughout treatment. 1425:  1st unit PRBCs started and infusing without difficulty, observed x 15 minutes 1653:  1st unit completed without adverse reaction noted, NS flushing line. 1700: Tolerated transfusion  well, no adverse reaction noted. D/C instructions reviewed, copy to pt, voiced understanding. Patient declined 1 hour post transfusion observation. Port flushed, heparinized and de accessed per protocol. Patient was discharged in stable condition. Patient is aware of next scheduled [facility] appointment on 3/31/21. Future Appointments Date Time Provider Gabriel Phillip 3/31/2021  9:00 AM G3 MELONY LONG [facility]  
4/7/2021  9:00 AM G3 MELONY LONG [facility]  
4/7/2021  3:30 PM [facility] COVID VAC 21 DAY [facility] MAIN BS CHIKI  
4/14/2021  9:30 AM MD ALMA ValenciaO KACI AMB Massimo Torres RN 
March 24, 2021

## 2021-03-31 NOTE — PROGRESS NOTES
[facility] Chemo Progress Note 
 
[id number] Ms. Kodak Tripp Arrived to [facility] for  Taxol (Q6P0) ambulatory with cane in stable condition. Assessment was completed, no acute issues at this time, no new complaints voiced. Port accessed with positive blood return. Labs drawn and sent for processing. Patient denies SOB, fever, cough, general not feeling well. Patient denies recent exposure to someone who has tested positive for COVID-19. Patient denies having contact with anyone who has a pending COVID test.   
 
Adolph W Neetu Bhakta for treatment. Medications ordered from pharmacy. Port connected to [facility] Chemotherapy Flowsheet 3/31/2021 Cycle C9D8 Date 3/31/2021 Drug / Regimen Taxol Dosage - Time Up - Time Down -  
Pre Hydration -  
Post Hydration -  
Pre Meds given Notes given Patient Vitals for the past 12 hrs: 
 Temp Pulse Resp BP  
03/31/21 1345  74  (!) 155/74  
03/31/21 0912 97.1 °F (36.2 °C) 96 18 (!) 153/69 Lab results were obtained and reviewed. Recent Results (from the past 12 hour(s)) CBC WITH AUTOMATED DIFF Collection Time: 03/31/21  9:23 AM  
Result Value Ref Range WBC 7.7 3.6 - 11.0 K/uL  
 RBC 2.62 (L) 3.80 - 5.20 M/uL HGB 7.5 (L) 11.5 - 16.0 g/dL HCT 23.7 (L) 35.0 - 47.0 % MCV 90.5 80.0 - 99.0 FL  
 MCH 28.6 26.0 - 34.0 PG  
 MCHC 31.6 30.0 - 36.5 g/dL  
 RDW 19.7 (H) 11.5 - 14.5 % PLATELET 726 976 - 977 K/uL MPV 10.1 8.9 - 12.9 FL  
 NRBC 2.5 (H) 0  WBC ABSOLUTE NRBC 0.19 (H) 0.00 - 0.01 K/uL NEUTROPHILS 68 32 - 75 % LYMPHOCYTES 19 12 - 49 % MONOCYTES 7 5 - 13 % EOSINOPHILS 1 0 - 7 % BASOPHILS 1 0 - 1 % IMMATURE GRANULOCYTES 4 (H) 0.0 - 0.5 % ABS. NEUTROPHILS 5.2 1.8 - 8.0 K/UL  
 ABS. LYMPHOCYTES 1.5 0.8 - 3.5 K/UL  
 ABS. MONOCYTES 0.5 0.0 - 1.0 K/UL  
 ABS. EOSINOPHILS 0.1 0.0 - 0.4 K/UL  
 ABS. BASOPHILS 0.1 0.0 - 0.1 K/UL  
 ABS. IMM. GRANS. 0.3 (H) 0.00 - 0.04 K/UL  
 DF SMEAR SCANNED    
 RBC COMMENTS ANISOCYTOSIS 2+ 
    
 RBC COMMENTS OVALOCYTES PRESENT 
    
 RBC COMMENTS RBC FRAGMENTS 
PRESENT 
    
METABOLIC PANEL, COMPREHENSIVE Collection Time: 03/31/21  9:23 AM  
Result Value Ref Range Sodium 142 136 - 145 mmol/L Potassium 3.6 3.5 - 5.1 mmol/L Chloride 108 97 - 108 mmol/L  
 CO2 26 21 - 32 mmol/L Anion gap 8 5 - 15 mmol/L Glucose 215 (H) 65 - 100 mg/dL BUN 33 (H) 6 - 20 MG/DL Creatinine 1.06 (H) 0.55 - 1.02 MG/DL  
 BUN/Creatinine ratio 31 (H) 12 - 20 GFR est AA >60 >60 ml/min/1.73m2 GFR est non-AA 53 (L) >60 ml/min/1.73m2 Calcium 8.7 8.5 - 10.1 MG/DL Bilirubin, total 0.5 0.2 - 1.0 MG/DL  
 ALT (SGPT) 50 12 - 78 U/L  
 AST (SGOT) 28 15 - 37 U/L Alk. phosphatase 58 45 - 117 U/L Protein, total 7.1 6.4 - 8.2 g/dL Albumin 3.4 (L) 3.5 - 5.0 g/dL Globulin 3.7 2.0 - 4.0 g/dL A-G Ratio 0.9 (L) 1.1 - 2.2 Pre-medications  were administered as ordered and chemotherapy was initiated. Medications Administered 0.9% sodium chloride infusion Admin Date 
03/31/2021 Action New Bag Dose 25 mL/hr Rate 25 mL/hr Route IntraVENous Administered By 
Tonya Delgado RN  
  
  
 dexamethasone (DECADRON) 12 mg in 0.9% sodium chloride 50 mL, overfill volume 5 mL IVPB Admin Date 
03/31/2021 Action Given Dose 
12 mg Route IntraVENous Administered By 
Tonya Delgado RN  
  
  
 diphenhydrAMINE (BENADRYL) injection 50 mg   
 Admin Date 
03/31/2021 Action Given Dose 50 mg Route IntraVENous Administered By 
Tonya Delgado RN  
  
  
 famotidine (PF) (PEPCID) 20 mg in 0.9% sodium chloride 10 mL injection Admin Date 
03/31/2021 Action Given Dose 
20 mg Route IntraVENous Administered By 
Tonya Delgado RN  
  
  
 heparin (porcine) pf 300-500 Units Admin Date 
03/31/2021 Action Given Dose 
500 Units Route InterCATHeter Administered By 
Tonya Delgado RN  
  
  
 PACLitaxeL (TAXOL) 197 mg in 0.9% sodium chloride 250 mL, overfill volume 25 mL chemo infusion  Admin Date 03/31/2021 Action New Bag Dose 
197 mg Rate 
307.8 mL/hr Route IntraVENous Administered By 
Carmelo Tavarez RN  
  
  
 palonosetron HCl (ALOXI) injection 0.25 mg   
 Admin Date 
03/31/2021 Action Given Dose 0.25 mg Route IntraVENous Administered By 
Carmelo Tavarez RN  
  
  
 saline peripheral flush soln 10 mL Admin Date 
03/31/2021 Action Given Dose 
10 mL Route InterCATHeter Administered By 
Carmelo Tavarez RN Admin Date 
03/31/2021 Action Given Dose 
10 mL Route InterCATHeter Administered By 
Carmelo Tavarez RN  
  
  
  
 
9910 Patient tolerated treatment well. Port maintained positive blood return throughout treatment. Port flushed, heparinized and de accessed per protocol. Patient was discharged from [facility] in stable condition. Patient is aware of next appointment. Future Appointments Date Time Provider Gabriel Phillip 4/7/2021  9:00 AM G3 MELONY LONG [place] [facility]  
4/7/2021  3:30 PM [facility] COVID VAC 21 DAY [facility] MAIN BS AMB  
4/14/2021  9:30 AM Jbaier Romero MD [facility] BS AMB Klevin Rubalcava RN 
March 31, 2021

## 2021-03-31 NOTE — PROGRESS NOTES
[address], [address] [facility], [address] 
P [phone]  F [phone] Office Visit Patient ID: 
Name: Dia Bunn MRN: [id number] : 1960/60 y.o. Visit date: 3/31/2021 LEONID Bill is an established patient 62 y.o.  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 The patient's previous treatment include primary debulking and adjuvant Taxol/carbo with retreatment in  for recurrence. Subsequent lines of chemo as outlined below. She has most recently started on retrial of single agent weekly Taxol in 2020 following progression with chest wall involvement while on PARPi. Most recent EOD imaging in  showed overall response to therapy. She is s/p R ureteral stenting in situ.   
   
 
OncTx History: 
12: MARAH/BSO/Cytoreductive surgery on 12 noted carcinomatosis/omental caking. 10/2012-2013: 6 cycles Carbo/Taxol 2014-2014: 6 Cycles carbo/Taxol 3/2015-5/13/15: 3 cycles Avastin/Doxil until progression 2015-2015 Weekly Topotecan  
2015-3/2016: Weekly Topotecan 2017-2017Viki Birks D1/D8 Q28 days for 6 cycles 2018: CT core needle bx chest wall mass: Metastatic high-grade serous carcinoma (see comment) Positive for malignancy. 2018-2018: Taxol weekly 2019: 10 fx 30cGy palliative RT to sternal lesion 2019 - 2020 [place]. Underwent right ureteral stent placement on 3/18/19. 
2020: CT CAP variable response to therapy with several lesions decreased in size. However a majority of the lesions have increased in size consistent with progression of disease with involvement of the mediastinum, liver masses, and peritoneal carcinomatosis. Increase right-sided hydronephrosis. Increasing . 
2020 - initiated Taxol weekly 10/2020 - RLE DVT on Eliquis 2021: CT CAP Response to therapy with significant decrease in size of thoracic lymphadenopathy, liver metastases, abdominal lymphadenopathy, and pelvic lymphadenopathy. Slight increased mild left hydronephrosis of uncertain significance. CT Results (most recent): 
Results from [facility] - [facility] Encounter encounter on 01/20/21 CT ABD PELV W CONT Narrative INDICATION:   Right ovarian cancer EXAM:  CT chest, abdomen, and pelvis WITH  CONTRAST 
 
COMPARISON:  CT chest abdomen and pelvis 6/24/2020 TECHNIQUE:  Thin collimation axial images were obtained through the chest, 
abdomen, and pelvis with IV contrast administration. Coronal and sagittal 
reconstructions were obtained. Oral contrast was administered. CT dose 
reduction was achieved through use of a standardized protocol tailored for this 
examination and automatic exposure control for dose modulation. FINDINGS: 
 
Chest: 
 
LUNGS: Medial right middle lobe and minimal medial left upper lobe opacities 
favor scarring. Minimal left basilar subsegmental atelectasis versus scarring. No other significant pulmonary abnormality. No suspicious nodule or mass. The 
central airways are patent. LYMPH NODES: Decrease in size of bulky prevascular anterior mediastinal and 
middle mediastinal lymphadenopathy. Prevascular lymph node measures 5.0 x 1.6 cm 
(series 3, image 21), compared to 6.0 x 2.5 cm previously. Middle mediastinal 
lymph node measures 1.7 x 1.67 m (image 28), compared to 2.4 x 2.3 cm. Anterior 
cardiophrenic lymphadenopathy measures smaller measuring 3.7 cm (image 41), 
compared to 4.4 cm. PLEURAL FLUID: No pleural effusion. PERICARDIAL FLUID: No pericardial effusion. THYROID: No thyroid nodule. OTHER: Left chest port extends to the proximal right atrium. Abdomen: LIVER: Decreased bulky left hepatic low-density lesions. A representative lesion 
measures 4.2 x 1.8 cm (image 51), compared to 5.8 x 3.8 cm.  A few low-density 
lesions in the right hepatic lobe are relatively stable with a representative 
lesion measuring 3.5 cm (image 60). No biliary ductal dilatation. GALLBLADDER: Decompressed without calcified stone. SPLEEN: Decreased infiltrative mass at the hilum measured 3.4 cm (image 52), 
compared to 5.8 cm previously. PANCREAS: No mass or ductal dilatation. ADRENALS: Unremarkable. KIDNEYS/URETERS: Symmetric nephrograms with mild bilateral renal cortical 
atrophy and scarring. Stable right ureteral stent with relatively unchanged mild 
to moderate right hydronephrosis. Slight increased mild left hydronephrosis of 
uncertain significance. PERITONEUM: Central mesenteric mass lesions are markedly smaller. A 
representative lesion measures 7.0 x 3.9 cm (image 66), compared to 9.9 x 5.6 
cm. Decrease in size in left peritoneal mass measuring 6.5 cm (image 57), 
compared to 7.4 cm. Retroperitoneal masses smaller with a representative lesion 
measuring 4.7 x 3.5 cm (image 79), compared to 6.5 x 4.0 cm. No significant 
ascites. COLON: No dilatation or wall thickening. APPENDIX: Not visualized SMALL BOWEL: No dilatation or wall thickening. STOMACH: Unremarkable. Pelvis: PELVIS: Decrease in pelvic lymphadenopathy and size of pelvic masses. A left 
external iliac chain lymph node measures 17 mm in short axis (image 107), 
compared to 32 mm previously. A right lower pelvic mass measures 5.3 cm in 
greatest length (image 109), compared to 7.0 cm. No pelvic free fluid. BONES: Stable appearance of the bones with sclerotic lesion in the sternum ADDITIONAL COMMENTS: N/A Impression Response to therapy with significant decrease in size of thoracic 
lymphadenopathy, liver metastases, abdominal lymphadenopathy, and pelvic 
lymphadenopathy. Slight increased mild left hydronephrosis of uncertain significance. SUBJECTIVE 3/31/2021: 
Gardenia Reed lost her brother this AM to a two week course of COVID19, admitted in [place].  She has had multiple relatives pass in the last 6 months.   
-No recurrent UTI, completed latest abx course Macrobid. No recent hematuria. Still with f/u to urology to discuss stent exchange timing and UTI suppressive therapy d/t frequent UTIs. Schuyler F/C, Dysuria. Last stent exchange in October 2020. 
-Remains fatigued, SOB, better s/p last transfusion. 
-Right hip pain stable, recently completed course of prednisone from her orthopedist.  Plans to start PT this Wednesday. Continues with rollator/cane and exercises. -Bilat leg swelling mildly improved on diuretic, follows with Pulmonary.  
-No N/V. Appetite stable. +BM daily to every other day with miralax. Takes tramadol nightly for generalized discomforts. 
-She has chronic pedal/hand numbness/tingling due to taxol and DM, PM pain. Seen by her diabetic provider, A1C ~5.8 per patient. Recently glucoses >400 d/t steroids. Continues to follow with her PCP, remains on multiple hyperglycemia medications including SSI. -Denies any change in depression, continues medication. Two adult sons at home who cook for her. Previously fostered 2 young boys she was going to adopt, one remains, age 15. Plans for him to move to a new family. She is trying to focus on herself and health, continues to exercise daily including stationary bike and bands. She remains active, independent in activity, ADLs. ROS 10 pt negative except as per above PMH: 
Past Medical History:  
Diagnosis Date  Adverse effect of anesthesia   
 sleep apnea cpap machine useage  Anemia  Arthritis DDD low back and knees  Asthma   
 uses albuterol prn only; none x 6 mos. Never hospitalized  BRCA negative 1/2013  Calculus of kidney  Chronic kidney disease   
 kidney stones  Chronic pain   
 knee pain bilat  CINV (chemotherapy-induced nausea and vomiting) 8/21/2014  Decreased hearing, right PATIENT STATES THIS IS DUE TO CHEMOTHERAPY  Diabetes ([facility] [address] Age 46 IDDM  Environmental allergies  Fibromyalgia  GERD (gastroesophageal reflux disease)   
 controlled with med  Hx of pulmonary embolus during pregnancy 2015  Hydronephrosis due to obstruction of ureter 3/18/2019  Hypertension  Ill-defined condition Sepsis  -  SOURCE PORT  
 Ill-defined condition 2016  
 chemotherapy  Mass of chest wall 2018  Morbid obesity ([facility] [address]  Murmur, heart  Other and unspecified hyperlipidemia  Ovarian cancer ([facility] [address] 2012, 2014  
 serous, high grade, stage IIIc. s/p chemotx (has port)  Psychiatric disorder Depression/Anxiety  Rectal bleeding  Thromboembolus ([address]  POST PARTUM; resolved- PELVIS  Thyroid disease HYPOTHYROID  Unspecified sleep apnea CPAP, Dr. Buzz Quintana PSH: 
Past Surgical History:  
Procedure Laterality Date   
  X2  
 HX HYSTERECTOMY  2012 ROULA/BSO, tumor debulking, omentectomy for ovarian cancer  HX ORTHOPAEDIC Right   
 ankle-FX, R  
 HX ORTHOPAEDIC Right   
 FX R ARM  
 HX UROLOGICAL Right  STENT FOR KIDNEY STONES  
 HX VASCULAR ACCESS  2015  
 right chest- port placed  HX VASCULAR ACCESS Left 2017 TETE CATH  
 [place] BREAST SURGERY PROCEDURE UNLISTED Lymph node in left breast 2017 SOC: 
Social History Socioeconomic History  Marital status:  Spouse name: Not on file  Number of children: Not on file  Years of education: Not on file  Highest education level: Not on file Occupational History  Not on file Social Needs  Financial resource strain: Not on file  Food insecurity Worry: Not on file Inability: Not on file  Transportation needs Medical: Not on file Non-medical: Not on file Tobacco Use  Smoking status: Never Smoker  Smokeless tobacco: Never Used Substance and Sexual Activity  Alcohol use: Yes Comment: 1 drink per month  Drug use: No  
 Sexual activity: Yes Lifestyle  Physical activity Days per week: Not on file Minutes per session: Not on file  Stress: Not on file Relationships  Social connections Talks on phone: Not on file Gets together: Not on file Attends Nondenominational service: Not on file Active member of club or organization: Not on file Attends meetings of clubs or organizations: Not on file Relationship status: Not on file  Intimate partner violence Fear of current or ex partner: Not on file Emotionally abused: Not on file Physically abused: Not on file Forced sexual activity: Not on file Other Topics Concern  Not on file Social History Narrative Lives in [facility] alone.  passed away in 5/16 of a heart attack. Has 2 sons. Disability. 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 Enjoys swimming and going to the gym. Family History Family History Problem Relation Age of Onset  Hypertension Mother [employer] Arthritis-osteo Mother  Hypertension Father  Arthritis-osteo Father  Cancer Father PROSTATE  COPD Father  Hypertension Brother  Elevated Lipids Brother  Arthritis-osteo Brother  Hypertension Brother  Elevated Lipids Brother  Obesity Brother  Cancer Brother PROSTATE  Anesth Problems Son DELAYED AWAKENING  
 Diabetes Maternal Grandmother  Anesth Problems Paternal Grandmother PATIENT STATES GRANDMOTHER'S HEART STOPPED DURING SURGERY Medications: (reviewed) Current Outpatient Medications on File Prior to Encounter Medication Sig Dispense Refill  Eliquis 5 mg tablet TAKE 1 TAB BY MOUTH TWO (2) TIMES A DAY FOR 30 DAYS. 60 Tab 2  
 amoxicillin (AMOXIL) 500 mg capsule TAKE 4 CAPSULES BY MOUTH 1 HOUR PRIOR TO APPT  gabapentin (NEURONTIN) 100 mg capsule Take 2 Caps by mouth nightly. Max Daily Amount: 200 mg.  Indications: neuropathic pain 60 Cap 0  
 dexAMETHasone (DECADRON) 4 mg tablet TAKE 2 TABLETS WITH BREAKFAST THE DAY BEFORE CHEMO AND FOR 2 DAYS AFTER CHEMO 36 Tab 2  predniSONE (DELTASONE) 50 mg tablet Take 1 tablet 13 hours prior to scan, take 1 tablet 7 hours prior to scan and take 1 tablet one hour prior to scan 3 Tab 0  
 omega-3 acid ethyl esters (LOVAZA) 1 gram capsule TAKE 1 CAPSULE BY MOUTH TWICE A  Cap 2  
 rosuvastatin (CRESTOR) 10 mg tablet Take 10 mg by mouth nightly.  doxazosin (CARDURA) 1 mg tablet Take  by mouth daily.  doxazosin (CARDURA) 1 mg tablet Take 1 mg by mouth daily.  SULFAMETHOXAZOLE-TRIMETHOPRIM PO Take  by mouth two (2) times a day. 7 days  lidocaine-prilocaine (EMLA) topical cream Apply small amount over port area and cover with band aid one hour before chemo 30 g 3  
 ondansetron (ZOFRAN ODT) 4 mg disintegrating tablet Take 1 Tab by mouth every eight (8) hours as needed for Nausea. Indications: nausea and vomiting caused by cancer drugs 30 Tab 2  
 fluticasone propionate (FLONASE) 50 mcg/actuation nasal spray 2 SPRAYS IN BOTH NOSTRILS DAILY 1 Bottle 0  
 cycloSPORINE (RESTASIS) 0.05 % dpet  albuterol (PROVENTIL VENTOLIN) 2.5 mg /3 mL (0.083 %) nebu Take 2.5 mg by inhalation.  telmisartan (MICARDIS) 20 mg tablet TAKE 1 TABLET BY MOUTH EVERY DAY 90 Tab 3  
 atorvastatin (LIPITOR) 20 mg tablet  insulin aspart U-100 (NOVOLOG FLEXPEN U-100 INSULIN) 100 unit/mL (3 mL) inpn INJECT 20 UNITS BEFORE EACH MEAL + 4 UNITS FOR EVERY 50 MG/DL ABOVE 150 MG/DL.  UNITS PER DAY 90 Adjustable Dose Pre-filled Pen Syringe 3  
 LEVEMIR FLEXTOUCH U-100 INSULN 100 unit/mL (3 mL) inpn INJECT 30 UNITS IN AM AND 50 UNITS AT NIGHT. INCREASE AS DIRECTED TO  UNITS/DAY. 90 Adjustable Dose Pre-filled Pen Syringe 3  DEXILANT 60 mg CpDB capsule (delayed release) TAKE 1 CAPSULE BY MOUTH EVERY DAY 90 Cap 3  
 LINZESS 145 mcg cap capsule TAKE 1 CAPSULE BY MOUTH EVERY DAYY 30 Cap 0  
 levothyroxine (SYNTHROID) 150 mcg tablet TAKE 1 TABLET BY MOUTH EVERY DAY BEFORE BREAKFAST 90 Tab 0  
 EPINEPHrine (EPIPEN) 0.3 mg/0.3 mL injection INJECT 0.3 ML BY INTRAMUSCULAR ROUTE ONCE AS NEEDED FOR UP TO 1 DOSE.  1  
 azelastine (ASTELIN) 137 mcg (0.1 %) nasal spray 1 Bulger by Both Nostrils route daily as needed. Use in each nostril as directed  insulin detemir U-100 (LEVEMIR) 100 unit/mL injection 20 Units by SubCUTAneous route daily (with lunch).  montelukast (SINGULAIR) 10 mg tablet Take 10 mg by mouth nightly.  ascorbic acid, vitamin C, (VITAMIN C) 1,000 mg tablet Take 1,000 mg by mouth daily.  albuterol (PROVENTIL VENTOLIN) 2.5 mg /3 mL (0.083 %) nebulizer solution 2.5 mg by Nebulization route every four (4) hours as needed for Wheezing.  Liraglutide (VICTOZA) 0.6 mg/0.1 mL (18 mg/3 mL) pnij 1.8 mg by SubCUTAneous route daily (with lunch). 3 Pen 3  
 SYMBICORT 160-4.5 mcg/actuation HFAA Take 2 Puffs by inhalation two (2) times daily as needed. 2 Inhaler 3  clonazePAM (KLONOPIN) 0.5 mg tablet Take 0.5 mg by mouth nightly.  sertraline (ZOLOFT) 50 mg tablet TAKE 1 TABLET BY MOUTH as needed  1  cholecalciferol, VITAMIN D3, (VITAMIN D3) 5,000 unit tab tablet Take 5,000 Units by mouth daily.  furosemide (LASIX) 40 mg tablet Take 20 mg by mouth every other day. 3 No current facility-administered medications on file prior to encounter. Allergies: (reviewed) Allergies Allergen Reactions  Carboplatin Other (comments) Patient developed shortness of breath, felt faint, coughing, skin flushed and \"itchy\". This occurred on the patients 8th treatment.  Iodinated Contrast Media Rash 13 hr pre-medication prior to IV contrast.  
Patient has done well with 1 hr pre-medication of (Solu-Medrol) 40mg &  (Benadryl) 50mg.  Lipitor [Atorvastatin] Myalgia  Shellfish Containing Products Hives  Tape [Adhesive] Itching and Other (comments)   \"op site-- clear,thin tape\"--caused blister  Tomato Hives  Iodine And Iodide Containing Products Rash  Olmesartan Other (comments)  Losartan Other (comments)  
  headaches  Percocet [Oxycodone-Acetaminophen] Other (comments) Fever; however, current home med OBJECTIVE Physical Exam 
Visit Vitals BP (!) 153/69 (BP 1 Location: Right upper arm) Pulse 96 Temp 97.1 °F (36.2 °C) Resp 18 Ht 5' 8\" (1.727 m) Wt 273 lb (123.8 kg) LMP 09/07/2011 BMI 41.51 kg/m² GENERAL BRIAN: Conversant, pleasant, alert, oriented. NAD. Tearful today HEENT: Oropharynx clear. Sclera anicteric. No JVD. No adenopathy CV: RRR, systolic murmur G1  
RESPIRATORY: CTA bilat GASTROINT: soft, non-tender, without masses or organomegaly, no fluid wave. Habitus limits exam. Previous RUQ nodule gone EXTREMITIES: bilat 1+ edema/girth pitting. Nontender here. ALCON SURVEY: Negative NEURO: Grossly intact, no acute deficit. Oriented. Not depressed. Not agitated. Wt Readings from Last 3 Encounters:  
03/31/21 273 lb (123.8 kg) 03/24/21 270 lb 6.4 oz (122.7 kg) 03/17/21 275 lb (124.7 kg) DATE REVIEW as available: 
Lab Results Component Value Date/Time WBC 7.7 03/31/2021 09:23 AM  
 Hemoglobin (POC) 10.5 (L) 03/20/2017 12:15 PM  
 HGB 7.5 (L) 03/31/2021 09:23 AM  
 Hematocrit (POC) 31 (L) 03/20/2017 12:15 PM  
 HCT 23.7 (L) 03/31/2021 09:23 AM  
 PLATELET 632 52/18/0056 09:23 AM  
 MCV 90.5 03/31/2021 09:23 AM  
 
Lab Results Component Value Date/Time  
 ABS. NEUTROPHILS 5.2 03/31/2021 09:23 AM  
 
Lab Results Component Value Date/Time  Sodium 142 03/31/2021 09:23 AM  
 Potassium 3.6 03/31/2021 09:23 AM  
 Chloride 108 03/31/2021 09:23 AM  
 CO2 26 03/31/2021 09:23 AM  
 Anion gap 8 03/31/2021 09:23 AM  
 Glucose 215 (H) 03/31/2021 09:23 AM  
 BUN 33 (H) 03/31/2021 09:23 AM  
 Creatinine 1.06 (H) 03/31/2021 09:23 AM  
 BUN/Creatinine ratio 31 (H) 03/31/2021 09:23 AM  
 GFR est AA >60 03/31/2021 09:23 AM  
 GFR est non-AA 53 (L) 03/31/2021 09:23 AM  
 Calcium 8.7 03/31/2021 09:23 AM  
 Bilirubin, total 0.5 03/31/2021 09:23 AM  
 Alk. phosphatase 58 03/31/2021 09:23 AM  
 Protein, total 7.1 03/31/2021 09:23 AM  
 Albumin 3.4 (L) 03/31/2021 09:23 AM  
 Globulin 3.7 03/31/2021 09:23 AM  
 A-G Ratio 0.9 (L) 03/31/2021 09:23 AM  
 ALT (SGPT) 50 03/31/2021 09:23 AM  
 
Lab Results Component Value Date/Time Hemoglobin A1c 5.8 (H) 03/28/2019 09:40 AM  
 Hemoglobin A1c (POC) 6.9 (A) 10/15/2015 12:00 PM  
 
 
CA-125 Date Value Ref Range Status 03/24/2021 43 (H) 1.5 - 35.0 U/mL Final  
  Comment:  
  ** Note new reference range and method ** Results may vary depending on . Method used is NuFlick 
  
02/24/2021 36 (H) 1.5 - 35.0 U/mL Final  
  Comment:  
  ** Note new reference range and method ** Results may vary depending on . Method used is NuFlick 
  
01/27/2021 37 (H) 1.5 - 35.0 U/mL Final  
  Comment:  
  ** Note new reference range and method ** Results may vary depending on . Method used is NuFlick 
  
12/29/2020 46 (H) 1.5 - 35.0 U/mL Final  
  Comment:  
  ** Note new reference range and method ** Results may vary depending on . Method used is NuFlick 
  
12/03/2020 48 (H) 1.5 - 35.0 U/mL Final  
  Comment:  
  ** Note new reference range and method ** Results may vary depending on . Method used is NuFlick CT 6/2020: 
FINDINGS:  
THYROID: No nodule. MEDIASTINUM: Left paratracheal adenopathy now measures 3.7 x 2.0 cm increased in 
size. Mass in AP window which extends superiorly into the left infraclavicular 
region and inferiorly into the left hilum is also increase in size. When 
measured in a similar fashion this measures 3.5 x 2.4 previously 3.0 x 2.9 cm. Right parasternal mass is decreased in size measuring 3.4 x 3.4 cm.  Subcarinal 
adenopathy measures 2.8 x 2.3 cm increased in size. There is increased size of 
the nodular component adjacent to the xiphoid now measuring 2.6 x 3.3 cm. SWATI: Left hilar lymph node measures 3.2 x 2.4 cm previously 2.5 x 1.8 cm. THORACIC AORTA: No dissection or aneurysm. MAIN PULMONARY ARTERY: Normal in caliber. TRACHEA/BRONCHI: Patent. ESOPHAGUS: No wall thickening or dilatation. HEART: Normal in size. PLEURA: No effusion or pneumothorax. LUNGS: No nodule, mass, or airspace disease. LIVER: Multiple masses are noted and better visualized on current study 
secondary to the addition of intravenous contrast. Comparison lesions measure 
previously, there is a 3.5 x 2.8 cm mass in the right hepatic lobe series 3 
image 58 slightly increased in size. There is a 3.6 x 2.9 cm mass in the left 
hepatic lobe slightly decreased in size series 3 image 56. Left hepatic lobe 
mass series 3 image 45 measuring 4.0 x 2.8 cm slightly increased in size. There 
is extensive tumor infiltration in the liver hilum with narrowing of the main 
portal vein as well as the intrahepatic portal veins. The common hepatic artery 
is not well visualized and likely occluded with some collateral flow noted in 
the liver hilum. GALLBLADDER: Not visualized SPLEEN: Mass in the splenic hilum increased in size measuring 7.4 x 4.3 cm with 
invasion into the splenic hilum. PANCREAS: The pancreas is encased by tumor. ADRENALS: There is likely involvement of the left adrenal gland tumor. KIDNEYS: Right nephroureteral stent with mild to moderate right-sided 
hydronephrosis. STOMACH: Tumor encasing the gastric antrum. SMALL BOWEL: No dilatation or wall thickening. COLON: Tumor abutting the tumor encasing the rectosigmoid junction. Tumor 
abutting the transverse colon. APPENDIX: Unremarkable. PERITONEUM: Some free fluid noted in the pelvis. Increased size of mesenteric 
masses. Representative examples: 7.8 x 4.3 cm tumor anterior peritoneum series 3 
image 84.  No pelvic tumor 7.5 x 4.8 cm series 3 image 100. RETROPERITONEUM:  
Right pelvic sidewall increased in size. Large right lateral lymph node 
measuring 3.1 x 2.0 cm not significantly changed. REPRODUCTIVE ORGANS: The visualized URINARY BLADDER: No mass or calculus. BONES: No destructive bone lesion. ADDITIONAL COMMENTS: N/A 
  
IMPRESSION IMPRESSION: 
1. There is some variable response to therapy with several lesions decreased in 
size. However a majority of the lesions have increased in size consistent with 
progression of disease with involvement of the mediastinum, liver masses, and 
peritoneal carcinomatosis. 2.  Increase right-sided hydronephrosis. iSquare ECHO 2020 · Normal cavity size and systolic function (ejection fraction normal). Mild concentric hypertrophy. Estimated left ventricular ejection fraction is 55 - 60%. Visually measured ejection fraction. No regional wall motion abnormality noted. · Mildly dilated left atrium. · Mild aortic valve sclerosis. · Trace mitral valve regurgitation is present. IMPRESSION AND PLAN: 
62 y.o. with diffusely metastatic ovarian cancer. Continues on palliative chemo, retrial taxol ECO Labs/chart reviewed 
-Metastatic ovarian cancer: C9 Taxol.  up this cycle 
-CKD stage 2-3: stable. Follows with nephrology. 
-Right hydronephrosis/dysuria/hematuria: stent in place. Exchanged 2020. Recurrent UTIs. Encouraged to discuss with her urologist and PCP suppressive therapy. Noted new mild left hydro on last CT. Compensated.  
-Liver mets: asx 
-RLE DVT acute/chronic: on NOAC. Improved on recent US 
-Anemia d/t disease/chemo/hematuria: Transfuse if Hgb ~6.5, routinely becomes sx. Last transfusion 3/24.  
-ECKERT/debility: Hx Asthma, anemia, chronic due to condition. Continue inhaler, follows with pulmonary. Recent CT hx atelectasis. No evidence of PE/effusion. IS support discussed. -HTN: states routinely normal at home. Continue tracking, advise f/u with PCP. -IDDM2: follows with PCP. Advised to f/u with PCP for better control. 
-Depression: controlled. -CIPN/diabetic polyneuropathy: stable on neurontin. -Right knee OA/right hip pain: PT pending, f/u with orthopedist 
-SHABANA: CPAP compliance encouraged 
-Psychosocial: She has some fear about the future, currently very pleased with progress. Very saddened by cumulative deaths recently. She is well supported by her sons and isaac. [place] remain very important. Monitor depressive sx mgmt. Discussed home life and plans for the future. On 3/31/2021 I have spent 30 minutes reviewing course, chart notes, pertinent test results, face to face with the patient discussing the aforementioned items, diagnosis and importance of compliance with the treatment plan. Electronically signed Cuca Dong PA-C

## 2021-04-07 NOTE — PROGRESS NOTES
[facility] Chemo Progress Note Date: 2021 Name: Arelis Mobley MRN: [id number] : 1960 
 
0930 [facility] [facility] Arrived to [facility] for  C9 D 15 Paclitaxel ambulatory in stable condition. Assessment was completed, no acute issues at this time, no new complaints voiced. Port accessed with positive blood return. Labs drawn and sent for processing. Chemotherapy Flowsheet 2021 Cycle C9 D15 Date 2021 Drug / Regimen TAXOL Dosage - Time Up - Time Down -  
Pre Hydration -  
Post Hydration -  
Pre Meds given Notes given Patient denies SOB, fever, cough, general not feeling well. Patient denies recent exposure to someone who has tested positive for COVID-19. Patient denies having contact with anyone who has a pending COVID test.   
 
Ms. Donita García vitals were reviewed. Patient Vitals for the past 12 hrs: 
 Temp Pulse BP  
21 1337  92 (!) 160/85  
21 0933 97.8 °F (36.6 °C) 94 (!) 170/84 Lab results were obtained and reviewed. Recent Results (from the past 12 hour(s)) CBC WITH AUTOMATED DIFF Collection Time: 21  9:37 AM  
Result Value Ref Range WBC 2.9 (L) 3.6 - 11.0 K/uL  
 RBC 2.36 (L) 3.80 - 5.20 M/uL HGB 6.8 (L) 11.5 - 16.0 g/dL HCT 21.7 (L) 35.0 - 47.0 % MCV 91.9 80.0 - 99.0 FL  
 MCH 28.8 26.0 - 34.0 PG  
 MCHC 31.3 30.0 - 36.5 g/dL RDW 20.8 (H) 11.5 - 14.5 % PLATELET 309 959 - 097 K/uL MPV 10.2 8.9 - 12.9 FL  
 NRBC 5.8 (H) 0  WBC ABSOLUTE NRBC 0.17 (H) 0.00 - 0.01 K/uL NEUTROPHILS 42 32 - 75 % BAND NEUTROPHILS 1 0 - 6 % LYMPHOCYTES 37 12 - 49 % MONOCYTES 17 (H) 5 - 13 % EOSINOPHILS 0 0 - 7 % BASOPHILS 0 0 - 1 % METAMYELOCYTES 1 (H) 0 % MYELOCYTES 2 (H) 0 % IMMATURE GRANULOCYTES 0 %  
 ABS. NEUTROPHILS 1.2 (L) 1.8 - 8.0 K/UL  
 ABS. LYMPHOCYTES 1.1 0.8 - 3.5 K/UL  
 ABS. MONOCYTES 0.5 0.0 - 1.0 K/UL  
 ABS. EOSINOPHILS 0.0 0.0 - 0.4 K/UL  
 ABS. BASOPHILS 0.0 0.0 - 0.1 K/UL ABS. IMM. GRANS. 0.0 K/UL  
 DF MANUAL    
 RBC COMMENTS ANISOCYTOSIS 2+ 
    
 RBC COMMENTS TEARDROP CELLS 
PRESENT 
    
 RBC COMMENTS OVALOCYTES PRESENT 
    
METABOLIC PANEL, BASIC Collection Time: 04/07/21  9:37 AM  
Result Value Ref Range Sodium 142 136 - 145 mmol/L Potassium 3.7 3.5 - 5.1 mmol/L Chloride 110 (H) 97 - 108 mmol/L  
 CO2 23 21 - 32 mmol/L Anion gap 9 5 - 15 mmol/L Glucose 223 (H) 65 - 100 mg/dL BUN 29 (H) 6 - 20 MG/DL Creatinine 1.20 (H) 0.55 - 1.02 MG/DL  
 BUN/Creatinine ratio 24 (H) 12 - 20 GFR est AA 56 (L) >60 ml/min/1.73m2 GFR est non-AA 46 (L) >60 ml/min/1.73m2 Calcium 8.8 8.5 - 10.1 MG/DL Pre-medications  were administered as ordered and chemotherapy was initiated. Medications Administered 0.9% sodium chloride infusion Admin Date 04/07/2021 Action New Bag Dose 25 mL/hr Rate 25 mL/hr Route IntraVENous Administered By 
Dominic Hawkins RN  
  
  
 dexamethasone (DECADRON) 12 mg in 0.9% sodium chloride 50 mL, overfill volume 5 mL IVPB Admin Date 04/07/2021 Action Given Dose 
12 mg Rate 232 mL/hr Route IntraVENous Administered By 
Dominic Hawkins RN  
  
  
 diphenhydrAMINE (BENADRYL) injection 50 mg   
 Admin Date 04/07/2021 Action Given Dose 50 mg Route IntraVENous Administered By 
Dominic Hawkins RN  
  
  
 famotidine (PF) (PEPCID) 20 mg in 0.9% sodium chloride 10 mL injection Admin Date 04/07/2021 Action Given Dose 
20 mg Route IntraVENous Administered By 
Dominic Hawkins RN  
  
  
 heparin (porcine) pf 300-500 Units Admin Date 04/07/2021 Action Given Dose 
500 Units Route InterCATHeter Administered By 
Dominic Hawkins RN  
  
  
 PACLitaxeL (TAXOL) 197 mg in 0.9% sodium chloride 250 mL, overfill volume 25 mL chemo infusion Admin Date 04/07/2021 Action New Bag Dose 
197 mg Rate 
307.8 mL/hr Route IntraVENous Administered By 
Dominic Hawkins RN  
  
  
 palonosetron HCl (ALOXI) injection 0.25 mg   
 Admin Date 04/07/2021 Action Given Dose 0.25 mg Route IntraVENous Administered By 
Liseth Souza, RN  
  
  
 saline peripheral flush soln 10 mL Admin Date 04/07/2021 Action Given Dose 
10 mL Route InterCATHeter Administered By 
Liseth Souza, [address] Patient tolerated treatment well. Port maintained positive blood return throughout treatment. Port flushed, heparinized and de accessed per protocol. Patient was discharged from [facility] Future Appointments Date Time Provider Gabriel Phillip 4/14/2021  9:30 AM MD LUCIEN Delgado AMB  
4/21/2021  9:00 AM G3 MELONY [place] [facility]  
4/28/2021  9:00 AM G3 MELONY [place] [facility]  
5/5/2021  9:00 AM G3 MELONY [place] [facility]  
5/12/2021 10:15 AM MD LUCIEN Delgado RN April 7, 2021

## 2021-04-12 NOTE — TELEPHONE ENCOUNTER
Pt calling with c/o extreme weakness all weekend, had to stay in bed and feels she has a cold without fevers.   Pt will get stat labs this AM.

## 2021-04-13 NOTE — TELEPHONE ENCOUNTER
Pt called office to check on labs drawn yesterday. Lab results given, pt states\"she started Prednisone yesterday along with inhaler's from PCP, and is feeling better and PO is 95% on room air, and will be attending her brothers  this week.

## 2021-04-14 NOTE — PROGRESS NOTES
virtual visit post C8D5 Taxol, pt states she received her 2nd COVID vaccine last Wednesday, she has been placed on prednisone by her pulmonary doctor and she still having some blood when she urinates 1. Have you been to the ER, urgent care clinic since your last visit? Hospitalized since your last visit?  no 
 
2. Have you seen or consulted any other health care providers outside of the [address] [place] [address] since your last visit? Include any pap smears or colon screening.    no

## 2021-04-14 NOTE — PROGRESS NOTES
[facility], [address] [place] [address] Thea Marin 
P [phone]  F [phone] Office Visit Patient ID: 
Name: Hermelinda Murry MRN: [id number] : 1960/60 y.o. Visit date: 2021 LEONID Pena is a 62 y.o.  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 The patient's previous treatment procedures include primary Taxol/carbo with retreatment in  and Doxil/Avastin, topotecan, gemzar and now single agent weekly Taxol initiated 18 following progression with chest wall involvement. In 2018, patient did not follow up and self-discontinued her treatment regimen. She presented to the ER on 18 with SOB. Negative workup for PE or MI. Admitted to Hospitalist service from -18. Managed for acute asthma exacerbation, acute FELICIA, and hyperkalemia. The patient was initiated on Brunilda Chi on 19. Underwent repeat CT C/A/P on 19 for purposes of following response to treatment. Patient was tolerating Brunilda Chi well for the first month, but then had a rise in her creatinine. However, she was also found to have hydronephrosis. Underwent right ureteral stent placement on 3/18/19. Restarted Zejula afterwards, although with continued rise in creatinine to >2. Held Brunilda Chi again and restarted Zejula at 100mg/daily on 19. Completed 2700 cGy of radiation to her chest wall mass on 19. Held Brunilda Chi one week in 10/2019 for blood transfusion secondary to anemia. Normal iron and B12 labs. CT C/A/P on 10/2/19 with improvement in her disease. CT C/A/P 2020 demonstrates progression. Rising Ca-125. On 2020 initiated on weekly paclitaxel. Presents today for consideration of cycle 9. CT A/P 2021 with significant improvement in her disease. She is taking iron supplementation and reports feeling well.  She is on Eliquis for a right lower extremity DVT. She has tolerated treatment well thus far, and denies complaints except for pain and swelling in her right leg. She denies pain in her chest wall or abdomen. She does have some constipation on the day of treatment, which is relieved with Miralax. Ureteral stent exchanged at the end of October. Scheduled to see Urology in February. She has not required any antibiotics recently. Denies change in appetite or bowel habits. Denies vaginal bleeding, hematuria, hematochezia, constipation, diarrhea, nausea, vomiting, CP, SOB, fevers, or chills. 
   
OncTx History: 
9/21/12: MARAH/BSO/Cytoreductive surgery on 9/21/12 noted carcinomatosis/omental caking. 10/2012-2/2013: 6 cycles Carbo/Taxol 8/2014-11/2014: 6 Cycles carbo/Taxol 3/2015-5/13/15: 3 cycles Avastin/Doxil until progression 7/2015-8/2015 Weekly Topotecan  
12/2015-3/2016: Weekly Topotecan 2/2017-9/2017Ancil Bowers D1/D8 Q28 days for 6 cycles 4/2018: CT core needle bx chest wall mass: Metastatic high-grade serous carcinoma (see comment) General Categorization Positive for malignancy. 5/8/2018-Present: Taxol  X9,8,85; S/P  Cycle #4  8/14/2018. Patient discontinued treatment secondary to side effects and fatigue. 1/23/2019 - Initiated Anais Shankar. Held do to rising creatinine. However, she was also found to have hydronephrosis. Underwent right ureteral stent placement on 3/18/19. CT C/A/P 6/24/2020 with mixed response, but overall progression. Rising Ca-125 now 268. 
8/6/2020: Initiated weekly paclitaxel 80mg/m2 days , 8, and 15 of 28-day cycle. CT A/P 1/20/2021 with significant improvement in her disease. Imaging Review:  
CT C/A/P 1/20/2021: 
FINDINGS: 
Chest: 
LUNGS: Medial right middle lobe and minimal medial left upper lobe opacities 
favor scarring. Minimal left basilar subsegmental atelectasis versus scarring. No other significant pulmonary abnormality. No suspicious nodule or mass. The 
central airways are patent.  
LYMPH NODES: Decrease in size of bulky prevascular anterior mediastinal and 
middle mediastinal lymphadenopathy. Prevascular lymph node measures 5.0 x 1.6 cm 
(series 3, image 21), compared to 6.0 x 2.5 cm previously. Middle mediastinal 
lymph node measures 1.7 x 1.67 m (image 28), compared to 2.4 x 2.3 cm. Anterior 
cardiophrenic lymphadenopathy measures smaller measuring 3.7 cm (image 41), 
compared to 4.4 cm. PLEURAL FLUID: No pleural effusion. PERICARDIAL FLUID: No pericardial effusion. THYROID: No thyroid nodule. OTHER: Left chest port extends to the proximal right atrium. Abdomen: LIVER: Decreased bulky left hepatic low-density lesions. A representative lesion 
measures 4.2 x 1.8 cm (image 51), compared to 5.8 x 3.8 cm. A few low-density 
lesions in the right hepatic lobe are relatively stable with a representative 
lesion measuring 3.5 cm (image 60). No biliary ductal dilatation. GALLBLADDER: Decompressed without calcified stone. SPLEEN: Decreased infiltrative mass at the hilum measured 3.4 cm (image 52), 
compared to 5.8 cm previously. PANCREAS: No mass or ductal dilatation. ADRENALS: Unremarkable. KIDNEYS/URETERS: Symmetric nephrograms with mild bilateral renal cortical 
atrophy and scarring. Stable right ureteral stent with relatively unchanged mild 
to moderate right hydronephrosis. Slight increased mild left hydronephrosis of 
uncertain significance. PERITONEUM: Central mesenteric mass lesions are markedly smaller. A 
representative lesion measures 7.0 x 3.9 cm (image 66), compared to 9.9 x 5.6 
cm. Decrease in size in left peritoneal mass measuring 6.5 cm (image 57), 
compared to 7.4 cm. Retroperitoneal masses smaller with a representative lesion 
measuring 4.7 x 3.5 cm (image 79), compared to 6.5 x 4.0 cm. No significant 
ascites. COLON: No dilatation or wall thickening. APPENDIX: Not visualized SMALL BOWEL: No dilatation or wall thickening. STOMACH: Unremarkable. Pelvis: PELVIS: Decrease in pelvic lymphadenopathy and size of pelvic masses. A left 
external iliac chain lymph node measures 17 mm in short axis (image 107), 
compared to 32 mm previously. A right lower pelvic mass measures 5.3 cm in 
greatest length (image 109), compared to 7.0 cm. No pelvic free fluid. BONES: Stable appearance of the bones with sclerotic lesion in the sternum ADDITIONAL COMMENTS: N/A IMPRESSION Response to therapy with significant decrease in size of thoracic 
lymphadenopathy, liver metastases, abdominal lymphadenopathy, and pelvic 
lymphadenopathy. Slight increased mild left hydronephrosis of uncertain significance. ROS Constitutional: no weight loss, fever, night sweats Respiratory: no cough, shortness of breath, or wheezing Cardiovascular: no chest pain or dyspnea on exertion. Reports chest wall mass smaller, no associated pain. Heme: No abnormal bleeding Gastrointestinal: no abdominal pain, change in bowel habits, or black or bloody stools Genito-Urinary: no dysuria, trouble voiding, or hematuria Musculoskeletal: + swelling in ankle - bilateral, mostly only at end of day Neurological: mild neuropathy Derm: pigmentation/induration medial left leg. Prior injury from fall. Psych: positive for depression - controlled PMH: 
Past Medical History:  
Diagnosis Date  Adverse effect of anesthesia   
 sleep apnea cpap machine useage  Anemia  Arthritis DDD low back and knees  Asthma   
 uses albuterol prn only; none x 6 mos. Never hospitalized  BRCA negative 1/2013  Calculus of kidney  Chronic kidney disease   
 kidney stones  Chronic pain   
 knee pain bilat  CINV (chemotherapy-induced nausea and vomiting) 8/21/2014  Decreased hearing, right PATIENT STATES THIS IS DUE TO CHEMOTHERAPY  Diabetes ([facility] [address] Age 46 IDDM  Environmental allergies  Fibromyalgia  GERD (gastroesophageal reflux disease)   
 controlled with med  Hx of pulmonary embolus during pregnancy 2015  Hydronephrosis due to obstruction of ureter 3/18/2019  Hypertension  Ill-defined condition Sepsis  -  SOURCE PORT  
 Ill-defined condition 2016  
 chemotherapy  Mass of chest wall 2018  Morbid obesity ([address]  Murmur, heart  Other and unspecified hyperlipidemia  Ovarian cancer ([facility] [address] 2012, 2014  
 serous, high grade, stage IIIc. s/p chemotx (has port)  Psychiatric disorder Depression/Anxiety  Rectal bleeding  Thromboembolus ([address]  POST PARTUM; resolved- PELVIS  Thyroid disease HYPOTHYROID  Unspecified sleep apnea CPAP, Dr. Ioana Bowen PSH: 
Past Surgical History:  
Procedure Laterality Date Needles  
  X2  
 HX HYSTERECTOMY  2012 ROULA/BSO, tumor debulking, omentectomy for ovarian cancer  HX ORTHOPAEDIC Right   
 ankle-FX, R  
 HX ORTHOPAEDIC Right   
 FX R ARM  
 HX UROLOGICAL Right  STENT FOR KIDNEY STONES  
 HX VASCULAR ACCESS  2015  
 right chest- port placed  HX VASCULAR ACCESS Left 2017 TETE CATH  
 TN BREAST SURGERY PROCEDURE UNLISTED Lymph node in left breast 2017 SOC: 
Social History Socioeconomic History  Marital status:  Spouse name: Not on file  Number of children: Not on file  Years of education: Not on file  Highest education level: Not on file Occupational History  Not on file Social Needs  Financial resource strain: Not on file  Food insecurity Worry: Not on file Inability: Not on file  Transportation needs Medical: Not on file Non-medical: Not on file Tobacco Use  Smoking status: Never Smoker  Smokeless tobacco: Never Used Substance and Sexual Activity  Alcohol use: Yes Comment: 1 drink per month  Drug use: No  
 Sexual activity: Yes Lifestyle  Physical activity Days per week: Not on file Minutes per session: Not on file  Stress: Not on file Relationships  Social connections Talks on phone: Not on file Gets together: Not on file Attends Caodaism service: Not on file Active member of club or organization: Not on file Attends meetings of clubs or organizations: Not on file Relationship status: Not on file  Intimate partner violence Fear of current or ex partner: Not on file Emotionally abused: Not on file Physically abused: Not on file Forced sexual activity: Not on file Other Topics Concern  Not on file Social History Narrative Lives in [facility] alone.  passed away in 5/16 of a heart attack. Has 2 sons. Disability. 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 Enjoys swimming and going to the gym. Family History Family History Problem Relation Age of Onset  Hypertension Mother [facility] Arthritis-osteo Mother  Hypertension Father  Arthritis-osteo Father  Cancer Father PROSTATE  COPD Father  Hypertension Brother  Elevated Lipids Brother  Arthritis-osteo Brother  Hypertension Brother  Elevated Lipids Brother  Obesity Brother  Cancer Brother PROSTATE  Anesth Problems Son DELAYED AWAKENING  
 Diabetes Maternal Grandmother  Anesth Problems Paternal Grandmother PATIENT STATES GRANDMOTHER'S HEART STOPPED DURING SURGERY Medications: (reviewed) Current Outpatient Medications on File Prior to Visit Medication Sig Dispense Refill  Eliquis 5 mg tablet TAKE 1 TAB BY MOUTH TWO (2) TIMES A DAY FOR 30 DAYS. 60 Tab 2  
 amoxicillin (AMOXIL) 500 mg capsule TAKE 4 CAPSULES BY MOUTH 1 HOUR PRIOR TO APPT  gabapentin (NEURONTIN) 100 mg capsule Take 2 Caps by mouth nightly. Max Daily Amount: 200 mg. Indications: neuropathic pain 60 Cap 0  
 dexAMETHasone (DECADRON) 4 mg tablet TAKE 2 TABLETS WITH BREAKFAST THE DAY BEFORE CHEMO AND FOR 2 DAYS AFTER CHEMO 36 Tab 2  predniSONE (DELTASONE) 50 mg tablet Take 1 tablet 13 hours prior to scan, take 1 tablet 7 hours prior to scan and take 1 tablet one hour prior to scan 3 Tab 0  
 omega-3 acid ethyl esters (LOVAZA) 1 gram capsule TAKE 1 CAPSULE BY MOUTH TWICE A  Cap 2  
 rosuvastatin (CRESTOR) 10 mg tablet Take 10 mg by mouth nightly.  doxazosin (CARDURA) 1 mg tablet Take  by mouth daily.  doxazosin (CARDURA) 1 mg tablet Take 1 mg by mouth daily.  SULFAMETHOXAZOLE-TRIMETHOPRIM PO Take  by mouth two (2) times a day. 7 days  lidocaine-prilocaine (EMLA) topical cream Apply small amount over port area and cover with band aid one hour before chemo 30 g 3  
 ondansetron (ZOFRAN ODT) 4 mg disintegrating tablet Take 1 Tab by mouth every eight (8) hours as needed for Nausea. Indications: nausea and vomiting caused by cancer drugs 30 Tab 2  
 fluticasone propionate (FLONASE) 50 mcg/actuation nasal spray 2 SPRAYS IN BOTH NOSTRILS DAILY 1 Bottle 0  
 cycloSPORINE (RESTASIS) 0.05 % dpet  albuterol (PROVENTIL VENTOLIN) 2.5 mg /3 mL (0.083 %) nebu Take 2.5 mg by inhalation.  telmisartan (MICARDIS) 20 mg tablet TAKE 1 TABLET BY MOUTH EVERY DAY 90 Tab 3  
 atorvastatin (LIPITOR) 20 mg tablet  insulin aspart U-100 (NOVOLOG FLEXPEN U-100 INSULIN) 100 unit/mL (3 mL) inpn INJECT 20 UNITS BEFORE EACH MEAL + 4 UNITS FOR EVERY 50 MG/DL ABOVE 150 MG/DL.  UNITS PER DAY 90 Adjustable Dose Pre-filled Pen Syringe 3  
 LEVEMIR FLEXTOUCH U-100 INSULN 100 unit/mL (3 mL) inpn INJECT 30 UNITS IN AM AND 50 UNITS AT NIGHT. INCREASE AS DIRECTED TO  UNITS/DAY. 90 Adjustable Dose Pre-filled Pen Syringe 3  DEXILANT 60 mg CpDB capsule (delayed release) TAKE 1 CAPSULE BY MOUTH EVERY DAY 90 Cap 3  
 LINZESS 145 mcg cap capsule TAKE 1 CAPSULE BY MOUTH EVERY DAYY 30 Cap 0  
 levothyroxine (SYNTHROID) 150 mcg tablet TAKE 1 TABLET BY MOUTH EVERY DAY BEFORE BREAKFAST 90 Tab 0  
 azelastine (ASTELIN) 137 mcg (0.1 %) nasal spray 1 [place] by Both Nostrils route daily as needed. Use in each nostril as directed  insulin detemir U-100 (LEVEMIR) 100 unit/mL injection 20 Units by SubCUTAneous route daily (with lunch).  montelukast (SINGULAIR) 10 mg tablet Take 10 mg by mouth nightly.  ascorbic acid, vitamin C, (VITAMIN C) 1,000 mg tablet Take 1,000 mg by mouth daily.  albuterol (PROVENTIL VENTOLIN) 2.5 mg /3 mL (0.083 %) nebulizer solution 2.5 mg by Nebulization route every four (4) hours as needed for Wheezing.  Liraglutide (VICTOZA) 0.6 mg/0.1 mL (18 mg/3 mL) pnij 1.8 mg by SubCUTAneous route daily (with lunch). 3 Pen 3  
 SYMBICORT 160-4.5 mcg/actuation HFAA Take 2 Puffs by inhalation two (2) times daily as needed. 2 Inhaler 3  clonazePAM (KLONOPIN) 0.5 mg tablet Take 0.5 mg by mouth nightly.  sertraline (ZOLOFT) 50 mg tablet TAKE 1 TABLET BY MOUTH as needed  1  cholecalciferol, VITAMIN D3, (VITAMIN D3) 5,000 unit tab tablet Take 5,000 Units by mouth daily.  furosemide (LASIX) 40 mg tablet Take 20 mg by mouth every other day. 3  
 EPINEPHrine (EPIPEN) 0.3 mg/0.3 mL injection INJECT 0.3 ML BY INTRAMUSCULAR ROUTE ONCE AS NEEDED FOR UP TO 1 DOSE.  1  
 
No current facility-administered medications on file prior to visit. Allergies: (reviewed) Allergies Allergen Reactions  Carboplatin Other (comments) Patient developed shortness of breath, felt faint, coughing, skin flushed and \"itchy\". This occurred on the patients 8th treatment.  Iodinated Contrast Media Rash 13 hr pre-medication prior to IV contrast.  
Patient has done well with 1 hr pre-medication of (Solu-Medrol) 40mg &  (Benadryl) 50mg.  Lipitor [Atorvastatin] Myalgia  Shellfish Containing Products Hives  Tape [Adhesive] Itching and Other (comments) \"op site-- clear,thin tape\"--caused blister  Tomato Hives  Iodine And Iodide Containing Products Rash  Olmesartan Other (comments)  Losartan Other (comments)  
  headaches  Percocet [Oxycodone-Acetaminophen] Other (comments) Fever; however, current home med OBJECTIVE *deferred today given video-conference visit for ongoing COVID-19 pandemic* Physical Exam 
Visit Vitals LMP 09/07/2011 Physical Exam: 
General: Alert and oriented. No acute distress. Well-nourished HEENT: No thyroid enlargment. Neck supple without restrictions. Sclera normal. Normal occular motion. Moist mucous membranes. Lymphatics: No evidence of axillary, cervical, or subclavicular adenopathy. Respiratory: clear to auscultation and percussion to the bases. No CVAT. Chest wall mass is still palpable along the superior sternum at the level of the superior aspect of the breasts, but improved. Normal overlying skin Cardiovascular: regular rate and rhythm. No murmurs, rubs, or gallops. Gastrointestinal: soft, non-tender, non-distended, no masses or organomegaly. Well-healed incision. Musculoskeletal: normal gait. No joint tenderness, deformity or swelling. No muscular tenderness. Extremities: extremities normal, atraumatic, mild 1+ edema bilaterally. Pelvic: deferred today Neuro: Grossly intact. Normal gait and movement. No acute deficit Skin: No evidence of rashes or skin changes. DATE REVIEW as available: 
Lab Results Component Value Date/Time WBC 4.6 04/12/2021 01:40 PM  
 Hemoglobin (POC) 10.5 (L) 03/20/2017 12:15 PM  
 HGB 7.2 (L) 04/12/2021 01:40 PM  
 Hematocrit (POC) 31 (L) 03/20/2017 12:15 PM  
 HCT 22.7 (L) 04/12/2021 01:40 PM  
 PLATELET 897 94/90/3370 01:40 PM  
 MCV 91 04/12/2021 01:40 PM  
 
Lab Results Component Value Date/Time  
 ABS. NEUTROPHILS 2.7 04/12/2021 01:40 PM  
 
Lab Results Component Value Date/Time  Sodium 142 04/12/2021 01:40 PM  
 Potassium 4.1 04/12/2021 01:40 PM  
 Chloride 104 04/12/2021 01:40 PM  
 CO2 20 04/12/2021 01:40 PM  
 Anion gap 9 04/07/2021 09:37 AM  
 Glucose 121 (H) 04/12/2021 01:40 PM  
 BUN 17 04/12/2021 01:40 PM  
 Creatinine 1.18 (H) 04/12/2021 01:40 PM  
 BUN/Creatinine ratio 14 04/12/2021 01:40 PM  
 GFR est AA 58 (L) 04/12/2021 01:40 PM  
 GFR est non-AA 51 (L) 04/12/2021 01:40 PM  
 Calcium 8.9 04/12/2021 01:40 PM  
 Bilirubin, total 0.7 04/12/2021 01:40 PM  
 Alk. phosphatase 44 04/12/2021 01:40 PM  
 Protein, total 6.0 04/12/2021 01:40 PM  
 Albumin 3.6 (L) 04/12/2021 01:40 PM  
 Globulin 3.7 03/31/2021 09:23 AM  
 A-G Ratio 1.5 04/12/2021 01:40 PM  
 ALT (SGPT) 31 04/12/2021 01:40 PM  
 
Ca-125: 
6/11/2020: 268 
9/3/2020: 187 
11/2020; 52 
12/3/2020: 48 
12/29/2020: 46: 
1/27/2020: 37 
2/24/2020: 36 ECHO 7/17/18 Left ventricle: Systolic function was normal. Ejection fraction was estimated in the range of 55 % to 60 %. There were no regional wall motion 
abnormalities. Wall thickness was mildly increased. Left atrium: The atrium was mildly dilated. Mitral valve: There was mild regurgitation. Aortic valve: Leaflets exhibited sclerosis without stenosis. Not well visualized. IMPRESSION AND PLAN: 
Ms. Jeanette Jarvis is a 62 y.o. female with recurrent, metastatic ovarian cancer. Heavily pre-treated. Lost to follow-up since 9/2018, at which time patient self-discontinued weekly paclitaxel. Initiated Jt Dress on 1/23/19. Held after 1 month secondary to rising creatinine. Found to have right hydronephrosis. Right ureteral stent placed 3/18/19 with normalization of creatinine. Completed 2700 cGy to the sternun 7/11/19. Restarted Zejula at 100mg/daily on 4/23/19. Now with rising Ca-125 and progression on CT 6/24/2020. Initiated on weekly Taxol 80mg/m2 days 1, 8, and 15 in a 28-day cycle on 8/6/2020. Patient Active Problem List  
 Diagnosis Date Noted  Carcinomatosis ([facility] [address] 09/22/2012 Priority: 1 - One  
 Controlled type 2 diabetes mellitus without complication, with long-term current use of insulin ([facility] [address] 09/22/2012 Priority: 2 - Two  Morbid obesity ([facility] [address] 09/22/2012 Priority: 2 - Two  COVID-19 04/17/2021  Fatigue 02/26/2021  
 SOB (shortness of breath) 02/26/2021  Lymphedema of both lower extremities 12/31/2019  S/P ureteral stent placement 10/09/2019  Anemia associated with chemotherapy 10/09/2019  Mass of chest wall 11/27/2018  Hypertension 07/18/2018  Pain due to malignant neoplasm metastatic to bone ([address] 07/18/2018  Diabetes ([address] 07/17/2018  Hypothyroidism 07/17/2018  Anemia 07/17/2018  Contrast media allergy 07/09/2018  On antineoplastic chemotherapy 05/31/2018  Reactive depression 05/31/2018  Type 2 diabetes mellitus with nephropathy ([address] 12/26/2017  Lymphadenopathy, mediastinal 07/18/2017  Pulmonary hypertension, mild ([address] 03/28/2017  Renal calculi 03/02/2017  Hyperglycemia due to type 2 diabetes mellitus ([address] 02/15/2017  Ovarian cancer ([address] 04/28/2015  Acute deep vein thrombosis (DVT) of proximal vein of right lower extremity ([address] 01/18/2015  Port-A-Cath in place 01/18/2015  Dyslipidemia (high LDL; low HDL)  SHABANA on CPAP 02/28/2014  Abnormal mammogram 01/15/2014 Reviewed patient's course to date. Presents today for consideration of cycle 9 weekly paclitaxel. Recent RLE DVT. Remains on Eliquis. Patient to remain on Eliquis indefinitely given her active ongoing treatment of her cancer. Tolerated cycle 1-8 well. Her Ca-125 is responding to treatment nicely. CT C/A/P 1/20/2021 with significant improvement in disease burden. Will follow-up pre-chemo labs. Plan to continue treatment for as long as Ca-125 continues to respond. Given that she is responding so nicely, I would like to continue treatment with paclitaxel for as long as possible. She is tolerating treatment well. May consider maintenance PARPi in the future with either olaparib or rucaparib, although the use of PARPi after prior PARPi is not well studied, although what is out there is promising.   
 
She remains anemic, which is certainly related to her prior radiation and chemotherapy and her underlying medical comorbid conditions. Will plan to transfuse as needed if symptomatic. She is heavily pre-treated and her bone marrow reserve remains low. Continue iron supplementation. Urology is managing her stent and recurrent UTIs. Has not required antibiotics for some time. Stent exchanged on 10/30/2020 per patient. F/u in 2/2021. The patient has underlying diabetes and neuropathy, which is actually improved right now per the patient's report. I expressed my concerns that we will need to watch this closely while on weekly Taxol, as this may certainly worsen her diabetic neuropathy. All questions and concerns were addressed with the patient and she is comfortable with the plan. An electronic signature was used to authenticate this note. Thuan Nye MD 
 
  
 Pursuant to the emergency declaration unde the [facility] waiver authority and the [employer] Act, this Virtual Visit was conducted, with patient's consent, to reduce the patient's risk of exposure to COVID-19 and provide continuity of care for an established patient. Patient identification was verified at the start of the visit: Yes Services were provided through a video synchronous discussion virtually to substitute for in-person clinic visit. Patient was at her individual home, while the provider was in his/her respective office. I spent at least 40 minutes with this established patient, and >50% of the time was spent counseling and/or coordinating care regarding ovarian cancer, chemotherapy Tuhan Nye MD

## 2021-04-17 PROBLEM — U07.1 COVID-19: Status: ACTIVE | Noted: 2021-01-01

## 2021-04-17 NOTE — ED NOTES
Bedside and Verbal shift change report given to Zane Graham (oncoming nurse) by Eva Heart (offgoing nurse). Report included the following information SBAR.

## 2021-04-17 NOTE — PROGRESS NOTES
Bedside shift change report given to Destiny Navarro (oncoming nurse) by Delaney Bales (offgoing nurse). Report included the following information SBAR.  
 
0740 - Called lab, they can add on CRP to previous labs. 0800 - Full assessment complete. CPAP at 12, 100%. 0900 - Labs and UA sent. Urine is veronica and pink tinged. Administered PRN tylenol for fever, 101.3F. RT aware MD would like to switch from CPAP to HFNC. 
 
0915 - Updated patient's son Olga Lidia Loredo over the phone. 1030 - RT to trial pt on HFNC 40L, 80% 0 - Dr. Vernon Duke aware of UA results, soft BP's 90's/50's, and HFNC @ 40L.  
 
1200 - Reassessment complete. 1340 - PT had another BM, incontinence care provided. While talking on the phone with family, oxygen saturation 81-83%. Recovered after a few minutes to 90%. 1600 - Reassessment complete. 1635 - Patient states she thinks she came with her glasses, but no glasses were found with pt when she arrived to this room from the ED. Describe them as gold wire glasses. Called ED to check for any missing glasses. 1 - Notified Dr. Vernon Duke of O2 sats 81-85% on HFNC 100%. MD would like pt to prone before trying CPAP again. RN assisted pt to modified prone on L side. Pt unable to lay completely on stomach, has large pannus and became SOB. Oxygen saturation improved to 92% on HFNC 100%, lying fully on L side. 1730 - CPAP mask back on for O2 sats 81-82%. Dr. Vernon Duke aware. 1750 - Updated patient's son over the phone. Son verified that they have patient's glasses at home. End of Shift Note Bedside shift change report given to Delaney Bales (oncoming nurse) by Massimo Grimaldo (offgoing nurse). Report included the following information SBAR Shift worked:  7a-7p Shift summary and any significant changes:  
  Requiring CPAP, 80% this evening. Tolerated heated HFNC % for about 6 hours today. BM x 2. Concerns for physician to address:   
  
Zone phone for oncoming shift:   [phone] Activity: 
Activity Level: Bed Rest 
Number times ambulated in hallways past shift: 0 Number of times OOB to chair past shift: 0 Cardiac:  
Cardiac Monitoring: Yes     
Cardiac Rhythm: Normal sinus rhythm Access:  
Current line(s): PIV Genitourinary:  
Urinary status: voiding Respiratory:  
O2 Device: CPAP mask Chronic home O2 use?: NO Incentive spirometer at bedside: YES 
  
GI: 
Last Bowel Movement Date: 04/17/21 Current diet:  DIET DIABETIC CONSISTENT CARB Regular Passing flatus: YES Tolerating current diet: YES Pain Management:  
Patient states pain is manageable on current regimen: YES Skin: 
Delroy Score: 14 Interventions: turn team 
 
Patient Safety: 
Fall Score: Total Score: 3 Interventions: bed/chair alarm High Fall Risk: Yes Length of Stay: 
Expected LOS: - - - Actual LOS: 0 Delcia Mention

## 2021-04-17 NOTE — PROGRESS NOTES
Spiritual Care Assessment/Progress Note [address] 
 
 
NAME: Kevin Osorio      MRN: [id number] AGE: 62 y.o. SEX: female Shinto Affiliation: [facility]  
Language: [place] 4/17/2021     Total Time (in minutes): 5 Spiritual Assessment begun in [facility] 2 CRITICAL CARE 2 through conversation with: 
  
    []Patient        [] Family    [] Friend(s) Reason for Consult: Palliative Care, Initial/Spiritual Assessment Spiritual beliefs: (Please include comment if needed) 
   [] Identifies with a isaac tradition:     
   [] Supported by a isaac community:        
   [] Claims no spiritual orientation:       
   [] Seeking spiritual identity:            
   [] Adheres to an individual form of spirituality:       
   [x] Not able to assess:                   
 
    
Identified resources for coping:  
   [] Prayer                           
   [] Music                  [] Guided Imagery 
   [] Family/friends                 [] Pet visits [] Devotional reading                         [x] Unknown 
   [] Other:                                         
 
 
Interventions offered during this visit: (See comments for more details) Patient Interventions: Initial visit, Other (comment)(Discussion with bedside RN Lashae Keith) Plan of Care: 
 
 [] Support spiritual and/or cultural needs  
 [] Support AMD and/or advance care planning process    
 [] Support grieving process 
 [] Coordinate Rites and/or Rituals  
 [] Coordination with community clergy [] No spiritual needs identified at this time 
 [] Detailed Plan of Care below (See Comments)  [] Make referral to Music Therapy 
[] Make referral to Pet Therapy    
[] Make referral to Addiction services 
[] Make referral to [facility] 
[] Make referral to Spiritual Care Partner 
[] No future visits requested       
[x] Follow up upon further referrals Comments:  
 
Initial Palliative Care Assessment attempt for Ms. Kem Robles in 3882. Performed chart review before the visit. Upon arrival, patient was appeared sleeping.  respected patient's privacy. Discussed with plan of care with RN Maci Caballero. Per her, patient's brother  of COVID-19, and today is his burial day. Family kep calling for her but she is tired today. Advised of  availability. [id number] [facility] Cathy Bullock, M.S., Th.M. 
Spiritual Care Provider  Paging Service [phone])

## 2021-04-17 NOTE — ED PROVIDER NOTES
EMERGENCY DEPARTMENT HISTORY AND PHYSICAL EXAM  
 
---------------------------------------------------------------------------- Please note that this dictation was completed with [employer]ixer, the [employer] voice recognition software. Quite often unanticipated grammatical, syntax, homophones, and other interpretive errors are inadvertently transcribed by the computer software. Please disregard these errors. Please excuse any errors that have escaped final proofreading 
---------------------------------------------------------------------------- 
 
 
Date: 4/17/2021 Patient Name: Mishel Meyer History of Presenting Illness Chief Complaint Patient presents with  Shortness of Breath Reports acute onset 2 days ago History Provided By:  Patient HPI: Mishel Meyer is a 62 y.o. female, with significant pmhx of ovarian cancer, retention, GERD, asthma, chronic pain, anemia diabetes, chronic kidney disease, depression and anxiety, who presents via EMS to the ED with c/o shortness of breath over the last 2 days progressively worsened. Patient was reportedly 50% on room air at home. Was placed on nonrebreather transported to our emergency department where she arrives in the high 70s on a nonrebreather. Patient also specifically denies any associated fevers, chills, CP, nausea, vomiting, diarrhea, abd pain, changes in BM, urinary sxs, or headache. Social Hx: denies tobacco  denies EtOH , denies Illicit Drugs There are no other complaints, changes, or physical findings at this time. PCP: Danuta Davis MD 
 
Allergies Allergen Reactions  Carboplatin Other (comments) Patient developed shortness of breath, felt faint, coughing, skin flushed and \"itchy\". This occurred on the patients 8th treatment.  Iodinated Contrast Media Rash 13 hr pre-medication prior to IV contrast.  
Patient has done well with 1 hr pre-medication of (Solu-Medrol) 40mg &  (Benadryl) 50mg.   
 Lipitor [Atorvastatin] Myalgia  Shellfish Containing Products Hives  Tape [Adhesive] Itching and Other (comments) \"op site-- clear,thin tape\"--caused blister  Tomato Hives  Iodine And Iodide Containing Products Rash  Olmesartan Other (comments)  Losartan Other (comments)  
  headaches  Percocet [Oxycodone-Acetaminophen] Other (comments) Fever; however, current home med Current Facility-Administered Medications Medication Dose Route Frequency Provider Last Rate Last Admin  sodium chloride (NS) flush 5-10 mL  5-10 mL IntraVENous PRN Rebecca Davis MD      
 cefTRIAXone (ROCEPHIN) 2 g in 0.9% sodium chloride (MBP/ADV) 50 mL MBP  2 g IntraVENous Q24H Rebecca Davis MD   Stopped at 04/17/21 0239  
 azithromycin (ZITHROMAX) 500 mg in 0.9% sodium chloride 250 mL (VIAL-MATE)  500 mg IntraVENous Q24H Rebecca Davis MD   500 mg at 04/17/21 0237  
 sodium chloride (NS) flush 5-10 mL  5-10 mL IntraVENous PRN Rebecca Davis MD      
 sodium chloride 0.9 % bolus infusion 1,000 mL  1,000 mL IntraVENous ONCE Rebecca Davis MD      
 Followed by  
[facility] sodium chloride 0.9 % bolus infusion 986 mL  986 mL IntraVENous ONCE Rebecca Davis MD      
 
Current Outpatient Medications Medication Sig Dispense Refill  Eliquis 5 mg tablet TAKE 1 TAB BY MOUTH TWO (2) TIMES A DAY FOR 30 DAYS. 60 Tab 2  
 amoxicillin (AMOXIL) 500 mg capsule TAKE 4 CAPSULES BY MOUTH 1 HOUR PRIOR TO APPT  gabapentin (NEURONTIN) 100 mg capsule Take 2 Caps by mouth nightly. Max Daily Amount: 200 mg. Indications: neuropathic pain 60 Cap 0  
 dexAMETHasone (DECADRON) 4 mg tablet TAKE 2 TABLETS WITH BREAKFAST THE DAY BEFORE CHEMO AND FOR 2 DAYS AFTER CHEMO 36 Tab 2  predniSONE (DELTASONE) 50 mg tablet Take 1 tablet 13 hours prior to scan, take 1 tablet 7 hours prior to scan and take 1 tablet one hour prior to scan 3 Tab 0  
 omega-3 acid ethyl esters (LOVAZA) 1 gram capsule TAKE 1 CAPSULE BY MOUTH TWICE A  Cap 2  
 rosuvastatin (CRESTOR) 10 mg tablet Take 10 mg by mouth nightly.  doxazosin (CARDURA) 1 mg tablet Take  by mouth daily.  doxazosin (CARDURA) 1 mg tablet Take 1 mg by mouth daily.  SULFAMETHOXAZOLE-TRIMETHOPRIM PO Take  by mouth two (2) times a day. 7 days  lidocaine-prilocaine (EMLA) topical cream Apply small amount over port area and cover with band aid one hour before chemo 30 g 3  
 ondansetron (ZOFRAN ODT) 4 mg disintegrating tablet Take 1 Tab by mouth every eight (8) hours as needed for Nausea. Indications: nausea and vomiting caused by cancer drugs 30 Tab 2  
 fluticasone propionate (FLONASE) 50 mcg/actuation nasal spray 2 SPRAYS IN BOTH NOSTRILS DAILY 1 Bottle 0  
 cycloSPORINE (RESTASIS) 0.05 % dpet  albuterol (PROVENTIL VENTOLIN) 2.5 mg /3 mL (0.083 %) nebu Take 2.5 mg by inhalation.  telmisartan (MICARDIS) 20 mg tablet TAKE 1 TABLET BY MOUTH EVERY DAY 90 Tab 3  
 atorvastatin (LIPITOR) 20 mg tablet  insulin aspart U-100 (NOVOLOG FLEXPEN U-100 INSULIN) 100 unit/mL (3 mL) inpn INJECT 20 UNITS BEFORE EACH MEAL + 4 UNITS FOR EVERY 50 MG/DL ABOVE 150 MG/DL.  UNITS PER DAY 90 Adjustable Dose Pre-filled Pen Syringe 3  
 LEVEMIR FLEXTOUCH U-100 INSULN 100 unit/mL (3 mL) inpn INJECT 30 UNITS IN AM AND 50 UNITS AT NIGHT. INCREASE AS DIRECTED TO  UNITS/DAY. 90 Adjustable Dose Pre-filled Pen Syringe 3  DEXILANT 60 mg CpDB capsule (delayed release) TAKE 1 CAPSULE BY MOUTH EVERY DAY 90 Cap 3  
 LINZESS 145 mcg cap capsule TAKE 1 CAPSULE BY MOUTH EVERY DAYY 30 Cap 0  
 levothyroxine (SYNTHROID) 150 mcg tablet TAKE 1 TABLET BY MOUTH EVERY DAY BEFORE BREAKFAST 90 Tab 0  
 EPINEPHrine (EPIPEN) 0.3 mg/0.3 mL injection INJECT 0.3 ML BY INTRAMUSCULAR ROUTE ONCE AS NEEDED FOR UP TO 1 DOSE.  1  
 azelastine (ASTELIN) 137 mcg (0.1 %) nasal spray 1 [place] by Both Nostrils route daily as needed. Use in each nostril as directed  insulin detemir U-100 (LEVEMIR) 100 unit/mL injection 20 Units by SubCUTAneous route daily (with lunch).  montelukast (SINGULAIR) 10 mg tablet Take 10 mg by mouth nightly.  ascorbic acid, vitamin C, (VITAMIN C) 1,000 mg tablet Take 1,000 mg by mouth daily.  albuterol (PROVENTIL VENTOLIN) 2.5 mg /3 mL (0.083 %) nebulizer solution 2.5 mg by Nebulization route every four (4) hours as needed for Wheezing.  Liraglutide (VICTOZA) 0.6 mg/0.1 mL (18 mg/3 mL) pnij 1.8 mg by SubCUTAneous route daily (with lunch). 3 Pen 3  
 SYMBICORT 160-4.5 mcg/actuation HFAA Take 2 Puffs by inhalation two (2) times daily as needed. 2 Inhaler 3  clonazePAM (KLONOPIN) 0.5 mg tablet Take 0.5 mg by mouth nightly.  sertraline (ZOLOFT) 50 mg tablet TAKE 1 TABLET BY MOUTH as needed  1  cholecalciferol, VITAMIN D3, (VITAMIN D3) 5,000 unit tab tablet Take 5,000 Units by mouth daily.  furosemide (LASIX) 40 mg tablet Take 20 mg by mouth every other day. 3  
 
 
Past History Past Medical History: 
Past Medical History:  
Diagnosis Date  Adverse effect of anesthesia   
 sleep apnea cpap machine useage  Anemia  Arthritis DDD low back and knees  Asthma   
 uses albuterol prn only; none x 6 mos. Never hospitalized  BRCA negative 1/2013  Calculus of kidney  Chronic kidney disease   
 kidney stones  Chronic pain   
 knee pain bilat  CINV (chemotherapy-induced nausea and vomiting) 8/21/2014  Decreased hearing, right PATIENT STATES THIS IS DUE TO CHEMOTHERAPY  Diabetes ([address] Age 46 IDDM  Environmental allergies  Fibromyalgia  GERD (gastroesophageal reflux disease)   
 controlled with med  Hx of pulmonary embolus during pregnancy 1/18/2015  Hydronephrosis due to obstruction of ureter 3/18/2019  Hypertension  Ill-defined condition Sepsis 9-2015 -  SOURCE PORT  
 Ill-defined condition 2016  
 chemotherapy  Mass of chest wall 05/2018  Morbid obesity ([address]  Murmur, heart  Other and unspecified hyperlipidemia  Ovarian cancer [address] 2012, 2014  
 serous, high grade, stage IIIc. s/p chemotx (has port)  Psychiatric disorder Depression/Anxiety  Rectal bleeding  Thromboembolus ([facility] [address]  POST PARTUM; resolved- PELVIS  Thyroid disease HYPOTHYROID  Unspecified sleep apnea CPAP, Dr. Rambo Meadows Past Surgical History: 
Past Surgical History:  
Procedure Laterality Date [place]  
  X2  
 HX HYSTERECTOMY  2012 ROULA/BSO, tumor debulking, omentectomy for ovarian cancer  HX ORTHOPAEDIC Right   
 ankle-FX, R  
 HX ORTHOPAEDIC Right   
 FX R ARM  
 HX UROLOGICAL Right  STENT FOR KIDNEY STONES  
 HX VASCULAR ACCESS  2015  
 right chest- port placed  HX VASCULAR ACCESS Left 2017 TETE CATH  
 [place] BREAST SURGERY PROCEDURE UNLISTED Lymph node in left breast 2017 Family History: 
Family History Problem Relation Age of Onset  Hypertension Mother [place] Arthritis-osteo Mother  Hypertension Father  Arthritis-osteo Father  Cancer Father PROSTATE  COPD Father  Hypertension Brother  Elevated Lipids Brother  Arthritis-osteo Brother  Hypertension Brother  Elevated Lipids Brother  Obesity Brother  Cancer Brother PROSTATE  Anesth Problems Son DELAYED AWAKENING  
 Diabetes Maternal Grandmother  Anesth Problems Paternal Grandmother PATIENT STATES GRANDMOTHER'S HEART STOPPED DURING SURGERY Social History: 
Social History Tobacco Use  Smoking status: Never Smoker  Smokeless tobacco: Never Used Substance Use Topics  Alcohol use: Yes Comment: 1 drink per month  Drug use: No  
 
 
Allergies: Allergies Allergen Reactions  Carboplatin Other (comments) Patient developed shortness of breath, felt faint, coughing, skin flushed and \"itchy\".  This occurred on the patients 8th treatment.  Iodinated Contrast Media Rash 13 hr pre-medication prior to IV contrast.  
Patient has done well with 1 hr pre-medication of (Solu-Medrol) 40mg &  (Benadryl) 50mg.  Lipitor [Atorvastatin] Myalgia  Shellfish Containing Products Hives  Tape [Adhesive] Itching and Other (comments) \"op site-- clear,thin tape\"--caused blister  Tomato Hives  Iodine And Iodide Containing Products Rash  Olmesartan Other (comments)  Losartan Other (comments)  
  headaches  Percocet [Oxycodone-Acetaminophen] Other (comments) Fever; however, current home med Review of Systems Review of Systems Constitutional: Negative. Negative for fever. Eyes: Negative. Respiratory: Positive for shortness of breath. Cardiovascular: Negative for chest pain. Gastrointestinal: Negative for abdominal pain, nausea and vomiting. Endocrine: Negative. Genitourinary: Negative. Negative for difficulty urinating, dysuria and hematuria. Musculoskeletal: Negative. Skin: Negative. Neurological: Negative. Psychiatric/Behavioral: Negative for suicidal ideas. All other systems reviewed and are negative. Physical Exam  
Physical Exam 
Vitals signs and nursing note reviewed. Constitutional:   
   General: She is not in acute distress. Appearance: She is well-developed. She is not diaphoretic. HENT:  
   Head: Normocephalic and atraumatic. Nose: Nose normal.  
Eyes:  
   General: No scleral icterus. Conjunctiva/sclera: Conjunctivae normal.  
Neck: Musculoskeletal: Normal range of motion. Trachea: No tracheal deviation. Cardiovascular:  
   Rate and Rhythm: Regular rhythm. Tachycardia present. Heart sounds: Normal heart sounds. No murmur. No friction rub. Pulmonary:  
   Effort: Pulmonary effort is normal. Tachypnea present. No respiratory distress. Breath sounds: No stridor. Decreased breath sounds present. No wheezing or rales. Abdominal:  
   General: Bowel sounds are normal. There is no distension. Palpations: Abdomen is soft. Tenderness: There is no abdominal tenderness. There is no rebound. Musculoskeletal: Normal range of motion. Right lower leg: Edema present. Left lower leg: Edema present. Skin: 
   General: Skin is warm and dry. Findings: No rash. Neurological:  
   Mental Status: She is alert and oriented to person, place, and time. Cranial Nerves: No cranial nerve deficit. Psychiatric:     
   Speech: Speech normal.     
   Behavior: Behavior normal.     
   Thought Content: Thought content normal.     
   Judgment: Judgment normal.  
 
 
 
 
Diagnostic Study Results Labs - Recent Results (from the past 12 hour(s)) EKG, 12 LEAD, INITIAL Collection Time: 04/17/21  1:52 AM  
Result Value Ref Range Ventricular Rate 115 BPM  
 Atrial Rate 115 BPM  
 P-R Interval 116 ms  
 QRS Duration 82 ms Q-T Interval 314 ms QTC Calculation (Bezet) 434 ms Calculated P Axis 43 degrees Calculated R Axis -3 degrees Calculated T Axis 25 degrees Diagnosis Sinus tachycardia with occasional and consecutive premature ventricular  
complexes When compared with ECG of 26-NOV-2018 18:08, 
premature ventricular complexes are now present ST now depressed in Inferior leads CBC WITH AUTOMATED DIFF Collection Time: 04/17/21  1:58 AM  
Result Value Ref Range WBC 6.5 3.6 - 11.0 K/uL  
 RBC 2.43 (L) 3.80 - 5.20 M/uL HGB 7.0 (L) 11.5 - 16.0 g/dL HCT 22.3 (L) 35.0 - 47.0 % MCV 91.8 80.0 - 99.0 FL  
 MCH 28.8 26.0 - 34.0 PG  
 MCHC 31.4 30.0 - 36.5 g/dL RDW 21.8 (H) 11.5 - 14.5 % PLATELET 450 146 - 270 K/uL MPV 10.3 8.9 - 12.9 FL  
 NRBC 1.8 (H) 0  WBC ABSOLUTE NRBC 0.12 (H) 0.00 - 0.01 K/uL NEUTROPHILS 83 (H) 32 - 75 % BAND NEUTROPHILS 1 % LYMPHOCYTES 13 12 - 49 % MONOCYTES 2 (L) 5 - 13 % EOSINOPHILS 0 0 - 7 % BASOPHILS 0 0 - 1 % MYELOCYTES 1 % IMMATURE GRANULOCYTES 0 0.0 - 0.5 % ABS. NEUTROPHILS 5.5 1.8 - 8.0 K/UL  
 ABS. LYMPHOCYTES 0.8 0.8 - 3.5 K/UL  
 ABS. MONOCYTES 0.1 0.0 - 1.0 K/UL  
 ABS. EOSINOPHILS 0.0 0.0 - 0.4 K/UL  
 ABS. BASOPHILS 0.0 0.0 - 0.1 K/UL  
 ABS. IMM. GRANS. 0.0 0.00 - 0.04 K/UL  
 DF AUTOMATED    
 RBC COMMENTS ANISOCYTOSIS 2+ 
    
 RBC COMMENTS TEARDROP CELLS 
PRESENT 
    
METABOLIC PANEL, COMPREHENSIVE Collection Time: 04/17/21  1:58 AM  
Result Value Ref Range Sodium 135 (L) 136 - 145 mmol/L Potassium 4.0 3.5 - 5.1 mmol/L Chloride 102 97 - 108 mmol/L  
 CO2 24 21 - 32 mmol/L Anion gap 9 5 - 15 mmol/L Glucose 154 (H) 65 - 100 mg/dL BUN 34 (H) 6 - 20 MG/DL Creatinine 1.71 (H) 0.55 - 1.02 MG/DL  
 BUN/Creatinine ratio 20 12 - 20 GFR est AA 37 (L) >60 ml/min/1.73m2 GFR est non-AA 31 (L) >60 ml/min/1.73m2 Calcium 8.1 (L) 8.5 - 10.1 MG/DL Bilirubin, total 0.6 0.2 - 1.0 MG/DL  
 ALT (SGPT) 96 (H) 12 - 78 U/L  
 AST (SGOT) 106 (H) 15 - 37 U/L Alk. phosphatase 56 45 - 117 U/L Protein, total 7.2 6.4 - 8.2 g/dL Albumin 3.0 (L) 3.5 - 5.0 g/dL Globulin 4.2 (H) 2.0 - 4.0 g/dL A-G Ratio 0.7 (L) 1.1 - 2.2    
TROPONIN I Collection Time: 04/17/21  1:58 AM  
Result Value Ref Range Troponin-I, Qt. <0.05 <0.05 ng/mL NT-PRO BNP Collection Time: 04/17/21  1:58 AM  
Result Value Ref Range NT pro- (H) <125 PG/ML  
SAMPLES BEING HELD Collection Time: 04/17/21  1:58 AM  
Result Value Ref Range SAMPLES BEING HELD 1RED 1BL GOLD COMMENT Add-on orders for these samples will be processed based on acceptable specimen integrity and analyte stability, which may vary by analyte. POC LACTIC ACID Collection Time: 04/17/21  2:01 AM  
Result Value Ref Range Lactic Acid (POC) 3.14 (HH) 0.40 - 2.00 mmol/L  
POC EG7 Collection Time: 04/17/21  2:08 AM  
Result Value Ref Range  Calcium, ionized (POC) 1.11 (L) 1.12 - 1.32 mmol/L  
 FIO2 (POC) 100 % pH (POC) 7.49 (H) 7.35 - 7.45    
 pCO2 (POC) 29.0 (L) 35.0 - 45.0 MMHG  
 pO2 (POC) 58 (L) 80 - 100 MMHG  
 HCO3 (POC) 21.9 (L) 22 - 26 MMOL/L Base deficit (POC) 1 mmol/L  
 sO2 (POC) 92 92 - 97 % Site RIGHT BRACHIAL Device: Non rebreather Flow rate (POC) 15 L/M Allens test (POC) YES Specimen type (POC) ARTERIAL Total resp. rate 30 SARS-COV-2 Collection Time: 04/17/21  2:41 AM  
Result Value Ref Range SARS-CoV-2 Please find results under separate order COVID-19 RAPID TEST Collection Time: 04/17/21  2:41 AM  
Result Value Ref Range Specimen source Nasopharyngeal    
 COVID-19 rapid test Detected (AA) NOTD POC LACTIC ACID Collection Time: 04/17/21  4:41 AM  
Result Value Ref Range Lactic Acid (POC) 0.86 0.40 - 2.00 mmol/L Radiologic Studies -  
XR CHEST PORT Final Result Bilateral lower lobe opacification. CT Results  (Last 48 hours) None CXR Results  (Last 48 hours) 04/17/21 0314  XR CHEST PORT Final result Impression:  Bilateral lower lobe opacification. Narrative:  EXAM: XR CHEST PORT INDICATION: Eval for Infiltrate COMPARISON: February 26 FINDINGS: A portable AP radiograph of the chest was obtained at 0304 hours. The  
rocio catheter seen in place. The patient is on a cardiac monitor. There is  
bilateral lower lobe opacification. The cardiac and mediastinal contours and  
pulmonary vascularity are normal.  The bones and soft tissues are grossly within  
normal limits. Medical Decision Making I am the first provider for this patient. I reviewed the vital signs, available nursing notes, past medical history, past surgical history, family history and social history. Vital Signs-Reviewed the patient's vital signs. Patient Vitals for the past 12 hrs: 
 Temp Pulse Resp BP SpO2  
04/17/21 0400  93 (!) 31 126/88 100 % 04/17/21 0332  98 30 124/64 100 % 04/17/21 0300  (!) 107 29  100 % 04/17/21 0215  (!) 104 28 129/63 90 % 04/17/21 0212     96 % 04/17/21 0201  (!) 109 (!) 35 125/80 91 % 04/17/21 0150  (!) 110 (!) 32 (!) 146/78 (!) 81 % 04/17/21 0143 100.4 °F (38 °C) (!) 114 24  (!) 80 % Pulse Oximetry Analysis - 80% on nonrebreather, abnormal 
Rate: 114 bpm 
Rhythm: sinus tach Provider Notes (Medical Decision Making): DDX: 
Pulmonary edema, CHF, pneumonia, arrhythmia Plan: 
EKG, labs, skin, BiPAP, Lasix kidney function tolerates Impression: 
COVID-19 pneumonia and acute respiratory failure ED Course:  
Initial assessment performed. The patients presenting problems have been discussed, and they are in agreement with the care plan formulated and outlined with them. I have encouraged them to ask questions as they arise throughout their visit. I reviewed the nursing notes and and vital signs from today's visit, as well as the electronic medical record system for any past medical records that were available that may contribute to the patients current condition, including previous evaluation by gynecology for ovarian cancer with completion of recent chemo therapy plan Nursing notes will be reviewed as they become available in realtime while the pt has been in the ED. Nora Rascon MD 
 
EKG interpretation 2770: Sinus tach, nl Axis, rate 115; , QRS 82, QTc 434; NO STEMI; interpreted by Nora Rascon MD 
 
I personally reviewed/interpreted pt's imaging. Agree with official read by radiology as noted above. Nora Rascon MD 
 
ED [facility] ED SEPSIS NOTE:  
 
1:56 AM The patient now meets criteria for: Severe Sepsis 1. Fluid resuscitation with: 30 mL/kg crystalloid bolus using ideal weight because patient's BMI is 30 or greater 2. Due to concern for rapidly advancing infection and deterioration of patient's condition, antibiotics are started STAT and cultures ordered. PROGRESS NOTE: 
0400 Pt noted to be Covid positive. Noted to be feeling much better with improved respiratory status. Will plan for admission to hospitalist. 
Sonal Loving MD 
 
CONSULT NOTE:  
4:57 Cecilia Estrada MD spoke with Dr. Garcia Rhodes, Specialty: Saad hRodes due to COVID-19 pneumonia. Discussed pt's HPI and available diagnostic results thus far. Expressed concerns for needed admission. Consultant will evaluate for admission. Sonal Loving MD 
 
 
ADMISSION NOTE: 
4:57 AM 
Patient is being admitted to the hospital by Dr. Garcia Rhodes. The results of their tests and reasons for their admission have been discussed with them and/or available family. They convey agreement and understanding for the need to be admitted and for their admission diagnosis. Sonal Loving MD 
 
 
  
 
Critical Care Time: CRITICAL CARE NOTE IMPENDING DETERIORATION -Airway, Respiratory, Cardiovascular, CNS, Metabolic and Renal 
ASSOCIATED RISK FACTORS - Shock, Hypoxia, Dysrhythmia, Metabolic changes, Dehydration, Vascular Compromise and CNS Decompensation MANAGEMENT- Bedside Assessment and Supervision of Care INTERPRETATION -  Xrays, Blood Gases, ECG and Blood Pressure INTERVENTIONS - hemodynamic mngmt, vascular control, Neurologic interventions  and Metobolic interventions CASE REVIEW - Hospitalist/Intensivist, Nursing and Family TREATMENT RESPONSE -Improved PERFORMED BY - Self NOTES   : 
I have spent 120 minutes of critical care time involved in lab review, consultations with specialist, family decision- making, bedside attention and documentation excluding time spent on any separately billed procedures. During this entire length of time I was immediately available to the patient . Sonal Loving MD 
 
 
 
Diagnosis Clinical Impression: 1. Pneumonia due to COVID-19 virus 2. Acute respiratory failure due to COVID-19 [facility]) PLAN: 
1.  Admit to hospitalist

## 2021-04-17 NOTE — ED NOTES
Assumed care of pt from EMS. Pt reports shortness of breath and generalized weakness for 2 days. EMS found her to have sats in the 60s upon their arrival and placed her on NRB with increase to 80s. Edema to bilateral lower extremities. Also c/o cough. Recently completed chemo for ovarian cancer. Monitor x3 in place. Dr. Pau Mcgill at bedside. RT at bedside. Call bell within reach.

## 2021-04-17 NOTE — ED NOTES
Pt with wet brief. Skin cleansed, clean linens placed. Pt reports she feels like she needs to void. Assisted to bedpan, was unable to urinate. While moving, pt reported feeling short of breath and expressed desire to removed cpap mask. Deep breathing encouraged and pt was able to leave mask on. Repositioned for comfort on stretcher. Blankets given. Call bell within reach.

## 2021-04-17 NOTE — H&P
History and Physical 
 
Patient: Shandra Meier MRN: [id number]  SSN: [id number] YOB: 1960  Age: 62 y.o. Sex: female Subjective: Shandra Meier is a 62 y.o. female who has a PMH of ovarian cancer s/p recent chemotherapy, CKD, urinary retention, GERD, asthma, chronic pain, anemia, depression and anxiety who presented to the ED via EMS with progressively worsening SOB x2 days. She denied fever, chills, N/V/D chest pain, or other constitutional symptoms. Oxygen sats were reported to be 50% on RA at home. She was placed on NRB by EMS and had oxygen sats in the 70s upon arrival to the ED. ABG on NRB was 7.49/29/58. She was transitioned to CPAP 14 100%. She was treated with empiric antibiotics. She was found to be COVID positive. ICU was consulted and she was accepted to the ICU service. Note: + contrast allergy Past Medical History:  
Diagnosis Date  Adverse effect of anesthesia   
 sleep apnea cpap machine useage  Anemia  Arthritis DDD low back and knees  Asthma   
 uses albuterol prn only; none x 6 mos. Never hospitalized  BRCA negative 1/2013  Calculus of kidney  Chronic kidney disease   
 kidney stones  Chronic pain   
 knee pain bilat  CINV (chemotherapy-induced nausea and vomiting) 8/21/2014  Decreased hearing, right PATIENT STATES THIS IS DUE TO CHEMOTHERAPY  Diabetes ([address] Age 46 IDDM  Environmental allergies  Fibromyalgia  GERD (gastroesophageal reflux disease)   
 controlled with med  Hx of pulmonary embolus during pregnancy 1/18/2015  Hydronephrosis due to obstruction of ureter 3/18/2019  Hypertension  Ill-defined condition Sepsis 9-2015 -  SOURCE PORT  
 Ill-defined condition 2016  
 chemotherapy  Mass of chest wall 05/2018  Morbid obesity ([address]  Murmur, heart  Other and unspecified hyperlipidemia  Ovarian cancer ([address] 9/2012, 1/2014  
 serous, high grade, stage IIIc. s/p chemotx (has port)  Psychiatric disorder Depression/Anxiety  Rectal bleeding  Thromboembolus ([address]  POST PARTUM; resolved- PELVIS  Thyroid disease HYPOTHYROID  Unspecified sleep apnea CPAP, Dr. Jodie Méndez Past Surgical History:  
Procedure Laterality Date [place]  
  X2  
 HX HYSTERECTOMY  2012 ROULA/BSO, tumor debulking, omentectomy for ovarian cancer  HX ORTHOPAEDIC Right   
 ankle-FX, R  
 HX ORTHOPAEDIC Right   
 FX R ARM  
 HX UROLOGICAL Right  STENT FOR KIDNEY STONES  
 HX VASCULAR ACCESS  2015  
 right chest- port placed  HX VASCULAR ACCESS Left  TETE CATH  
 [facility] BREAST SURGERY PROCEDURE UNLISTED Lymph node in left breast 2017 Family History Problem Relation Age of Onset  Hypertension Mother [place] Arthritis-osteo Mother  Hypertension Father  Arthritis-osteo Father  Cancer Father PROSTATE  COPD Father  Hypertension Brother  Elevated Lipids Brother  Arthritis-osteo Brother  Hypertension Brother  Elevated Lipids Brother  Obesity Brother  Cancer Brother PROSTATE  Anesth Problems Son DELAYED AWAKENING  
 Diabetes Maternal Grandmother  Anesth Problems Paternal Grandmother PATIENT STATES GRANDMOTHER'S HEART STOPPED DURING SURGERY Social History Tobacco Use  Smoking status: Never Smoker  Smokeless tobacco: Never Used Substance Use Topics  Alcohol use: Yes Comment: 1 drink per month Prior to Admission medications Medication Sig Start Date End Date Taking? Authorizing Provider Eliquis 5 mg tablet TAKE 1 TAB BY MOUTH TWO (2) TIMES A DAY FOR 30 DAYS. 3/1/21   Mahesh Toledo PA-C  
amoxicillin (AMOXIL) 500 mg capsule TAKE 4 CAPSULES BY MOUTH 1 HOUR PRIOR TO APPT 21   Provider, Historical  
gabapentin (NEURONTIN) 100 mg capsule Take 2 Caps by mouth nightly.  Max Daily Amount: 200 mg. Indications: neuropathic pain 2/10/21   Dax Arana PA-C  
dexAMETHasone (DECADRON) 4 mg tablet TAKE 2 TABLETS WITH BREAKFAST THE DAY BEFORE CHEMO AND FOR 2 DAYS AFTER CHEMO 1/28/21   Gianna Simpson MD  
predniSONE (DELTASONE) 50 mg tablet Take 1 tablet 13 hours prior to scan, take 1 tablet 7 hours prior to scan and take 1 tablet one hour prior to scan 1/19/21   Gianna Simpson MD  
omega-3 acid ethyl esters (LOVAZA) 1 gram capsule TAKE 1 CAPSULE BY MOUTH TWICE A DAY 10/6/20   Sukhdeep Howard MD  
rosuvastatin (CRESTOR) 10 mg tablet Take 10 mg by mouth nightly. Provider, Historical  
doxazosin (CARDURA) 1 mg tablet Take  by mouth daily. Provider, Historical  
doxazosin (CARDURA) 1 mg tablet Take 1 mg by mouth daily. Provider, Historical  
SULFAMETHOXAZOLE-TRIMETHOPRIM PO Take  by mouth two (2) times a day. 7 days    Provider, Historical  
lidocaine-prilocaine (EMLA) topical cream Apply small amount over port area and cover with band aid one hour before chemo 6/30/20   Gianna Simpson MD  
ondansetron (ZOFRAN ODT) 4 mg disintegrating tablet Take 1 Tab by mouth every eight (8) hours as needed for Nausea. Indications: nausea and vomiting caused by cancer drugs 6/30/20   Gianna Simpson MD  
fluticasone propionate [facility]) 50 mcg/actuation nasal spray 2 SPRAYS IN BOTH NOSTRILS DAILY 2/20/20   Sukhdeep Howard MD  
cycloSPORINE (RESTASIS) 0.05 % dpet  11/12/19   Provider, Historical  
albuterol (PROVENTIL VENTOLIN) 2.5 mg /3 mL (0.083 %) nebu Take 2.5 mg by inhalation. Provider, Historical  
telmisartan (MICARDIS) 20 mg tablet TAKE 1 TABLET BY MOUTH EVERY DAY 10/21/19   Sukhdeep Howard MD  
atorvastatin (LIPITOR) 20 mg tablet  10/7/19   Provider, Historical  
insulin aspart U-100 (NOVOLOG FLEXPEN U-100 INSULIN) 100 unit/mL (3 mL) inpn INJECT 20 UNITS BEFORE EACH MEAL + 4 UNITS FOR EVERY 50 MG/DL ABOVE 150 MG/DL.   UNITS PER DAY 9/11/19   Sukhdeep Howard MD  
[address] [facility] U-100 INSULN 100 unit/mL (3 mL) inpn INJECT 30 UNITS IN AM AND 50 UNITS AT NIGHT. INCREASE AS DIRECTED TO  UNITS/DAY. 8/15/19   Nick Richard MD  
DEXILANT 60 mg CpDB capsule (delayed release) TAKE 1 CAPSULE BY MOUTH EVERY DAY 6/21/19   Nick Richard MD  
LINZESS 145 mcg cap capsule TAKE 1 CAPSULE BY MOUTH EVERY DAYY 4/18/19   Nick Richard MD  
levothyroxine (SYNTHROID) 150 mcg tablet TAKE 1 TABLET BY MOUTH EVERY DAY BEFORE BREAKFAST 4/2/19   Army Jo MD  
EPINEPHrine (EPIPEN) 0.3 mg/0.3 mL injection INJECT 0.3 ML BY INTRAMUSCULAR ROUTE ONCE AS NEEDED FOR UP TO 1 DOSE. 1/16/19   Provider, Historical  
azelastine (ASTELIN) 137 mcg (0.1 %) nasal spray 1 [place] by Both Nostrils route daily as needed. Use in each nostril as directed     Provider, Historical  
insulin detemir U-100 (LEVEMIR) 100 unit/mL injection 20 Units by SubCUTAneous route daily (with lunch). Provider, Historical  
montelukast (SINGULAIR) 10 mg tablet Take 10 mg by mouth nightly. Provider, Historical  
ascorbic acid, vitamin C, (VITAMIN C) 1,000 mg tablet Take 1,000 mg by mouth daily. Provider, Historical  
albuterol (PROVENTIL VENTOLIN) 2.5 mg /3 mL (0.083 %) nebulizer solution 2.5 mg by Nebulization route every four (4) hours as needed for Wheezing. Provider, Historical  
Liraglutide (VICTOZA) 0.6 mg/0.1 mL (18 mg/3 mL) pnij 1.8 mg by SubCUTAneous route daily (with lunch). 3/27/18   Nick Richard MD  
SYMBICORT 160-4.5 mcg/actuation HFAA Take 2 Puffs by inhalation two (2) times daily as needed. 12/26/17   Nelly Higgins MD  
clonazePAM (KLONOPIN) 0.5 mg tablet Take 0.5 mg by mouth nightly. Provider, Historical  
sertraline (ZOLOFT) 50 mg tablet TAKE 1 TABLET BY MOUTH as needed 2/18/17   Provider, Historical  
cholecalciferol, VITAMIN D3, (VITAMIN D3) 5,000 unit tab tablet Take 5,000 Units by mouth daily. Provider, Historical  
furosemide (LASIX) 40 mg tablet Take 20 mg by mouth every other day. 9/15/15   Provider, Historical  
  
 
Allergies Allergen Reactions  Carboplatin Other (comments) Patient developed shortness of breath, felt faint, coughing, skin flushed and \"itchy\". This occurred on the patients 8th treatment.  Iodinated Contrast Media Rash 13 hr pre-medication prior to IV contrast.  
Patient has done well with 1 hr pre-medication of (Solu-Medrol) 40mg &  (Benadryl) 50mg.  Lipitor [Atorvastatin] Myalgia  Shellfish Containing Products Hives  Tape [Adhesive] Itching and Other (comments) \"op site-- clear,thin tape\"--caused blister  Tomato Hives  Iodine And Iodide Containing Products Rash  Olmesartan Other (comments)  Losartan Other (comments)  
  headaches  Percocet [Oxycodone-Acetaminophen] Other (comments) Fever; however, current home med Review of Systems: As per HPI Objective:  
 
Vitals:  
 04/17/21 0300 04/17/21 0332 04/17/21 0400 04/17/21 0500 BP:  124/64 126/88 129/67 Pulse: (!) 107 98 93 90 Resp: 29 30 (!) 31 30 Temp:      
SpO2: 100% 100% 100% 100% Weight:      
Height:      
  
 
Physical Exam: 
Breeding [place], alert EYE:PERRLA THROAT & NECK: Supple, no JVD LUNG: Tachypnea, CTAB, diminished breath sounds HEART: Tachycardia, RRR, 2+ pulses, 1+ edema of extremities ABDOMEN:Obese, soft, nontender, nondistended EXTREMITIES:  2+ pulses, 1+ edema SKIN: c/d/i NEUROLOGIC:Alert and oriented, nonfocal 
PSYCHIATRIC: Calm, cooperative Assessment:  
 
 
COVID pneumonia: Received second COVID vaccine 4/7 (unsure MFR). Presented with SOB, COVID positive. CXR w bilateral lower lobe opacification. 
- Not a candidate for remdesivir due CPAP 
- Sent CRP and will assess value to determine if candidate for tocilizumab  
- Started dexamethasone - Zinc/vitamin C 
- Change to Hi Flow as patient appears to be ventilating appropriately on ABG 
- Continue empiric azithromycin/ceftriaxone - Continue eliquis (home med) for anticoagulation FELICIA on CKD: Baseline Cr 1.06-1. 2. Presented w 1.7. Prerenal vs ATN. Received IVF in ED. 
- Repeat BMP 
 
DM c/b neuropathy: 
- SSI 
- Takes levemir at home - discuss w pharmacy for conversion dosing inpt 
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 First diagnosed Sept 2012. On palliative chemotherapy with single agent weekly taxol per gynecology note from 2/26/2021. Requires intermittent PRBC transfusions. OncTx History: 
9/21/12: MARAH/BSO/Cytoreductive surgery on 9/21/12 noted carcinomatosis/omental caking. 10/2012-2/2013: 6 cycles Carbo/Taxol 8/2014-11/2014: 6 Cycles carbo/Taxol 3/2015-5/13/15: 3 cycles Avastin/Doxil until progression 7/2015-8/2015 Weekly Topotecan  
12/2015-3/2016: Weekly Topotecan 2/2017-9/2017Dania Popeye D1/D8 Q28 days for 6 cycles 4/2018: CT core needle bx chest wall mass: Metastatic high-grade serous carcinoma (see comment) Positive for malignancy. 5/8/2018-8/2018: Taxol weekly July 2019: 10 fx 30cGy palliative RT to sternal lesion 1/23/2019 - 5/2020 [place]. Underwent right ureteral stent placement on 3/18/19. 
6/2020: CT CAP variable response to therapy with several lesions decreased in size. However a majority of the lesions have increased in size consistent with progression of disease with involvement of the mediastinum, liver masses, and peritoneal carcinomatosis. Increase right-sided hydronephrosis. Increasing . 
8/2020 - initiated Taxol weekly 10/2020 - RLE DVT on Eliquis 1/2021: CT CAP Response to therapy with significant decrease in size of thoracic lymphadenopathy, liver metastases, abdominal lymphadenopathy, and pelvic lymphadenopathy. Slight increased mild left hydronephrosis of uncertain significance. CRITICAL CARE CONSULTANT ATTESTATION I had a face to face encounter with the patient, reviewed and interpreted patient data including clinical events, labs, images, vital signs, I/O's, and examined patient. I have discussed the case and the plan and management of the patient's care with the consulting services, the bedside nurses and the respiratory therapist.   
 
NOTE OF PERSONAL INVOLVEMENT IN CARE This patient has a high probability of imminent, clinically significant deterioration, which requires the highest level of preparedness to intervene urgently. I participated in the decision-making and personally managed or directed the management of the following life and organ supporting interventions that required my frequent assessment to treat or prevent imminent deterioration. I personally spent 60 minutes of critical care time. This is time spent at this critically ill patient's bedside actively involved in patient care as well as the coordination of care and discussions with the patient's family. This does not include any procedural time which has been billed separately. Pedro Brown NP [facility] 4/17/2021 Signed By: Pedro Brown NP April 17, 2021

## 2021-04-17 NOTE — ED NOTES
TRANSFER - OUT REPORT: 
 
Verbal report given to David Shah RN(name) on Theotis Speck  being transferred to CCU(unit) for routine progression of care Report consisted of patients Situation, Background, Assessment and  
Recommendations(SBAR). Information from the following report(s) SBAR, ED Summary, [facility] and Recent Results was reviewed with the receiving nurse. Lines:  
Venous Access Device VAD Upper chest (subclavicular area), left (Active) Peripheral IV 04/17/21 Right Antecubital (Active) Site Assessment Clean, dry, & intact 04/17/21 0204 Phlebitis Assessment 0 04/17/21 0204 Infiltration Assessment 0 04/17/21 0204 Peripheral IV 04/17/21 Left Antecubital (Active) Site Assessment Clean, dry, & intact 04/17/21 3519 Phlebitis Assessment 0 04/17/21 0218 Infiltration Assessment 0 04/17/21 0218 Dressing Status Clean, dry, & intact 04/17/21 1967 Dressing Type Transparent;Tape 04/17/21 1489 Hub Color/Line Status Pink 04/17/21 0218 Opportunity for questions and clarification was provided. Patient transported with: 
 Monitor Registered Nurse RT

## 2021-04-18 NOTE — PROGRESS NOTES
0730  Report from [facility] LOPEZ RN 
 
0800  Assessment complete  Patient awake alert oriented X4  Face color pale VSS on CPAP 14 80%. #20 in left and right AC capped. Jean-Paul cath not accessed. Swelling noted in both lower ext's 
 
0900  BM cleaned medium brown loose 
 
0920  C/O of nausea feeling after IV decardron Zofran given see MAR  Removed from CPAP and put on high flow very slow to recover but not tachypnea and no sob feeling  Sat's 85-86% will continue to monitor on high flow [address] sent 1100  H&H remains low 6.3 Dr Anahi Pina to order 1 unit RBC 
 
1330  Assessment complete  Sat's dropping placed back on CPAP 14 100% sat's recovering after a few minutes no c/o sob. RBC started 1600  Assessment complete  Attempted to remove CPAP mask and place on high flow but sat's dropped low 70's not recovering placed back on CPAP and Sat's back in low 90's 
 
1800  Patient on bedpan cleaned after just urine no BM.  
 
1900  Report to [address] [place] [address],[address] RN

## 2021-04-18 NOTE — PROGRESS NOTES
[facility] 
 
ICU TEAM Progress Note Name: Yamil Allen : 1960 MRN: [id number] Date: 2021 Assessment/Plan: 1. Acute hypoxic respiratory failure d/t COVID pna 
a. HiFlo/CPAP/BiPAP as needed. Alternate modalities as fareed.  
b. Bronchodilators 
c. Steroids 
d. Rocephin/Azith for up to 3d course 
e. DDimer cont to rise - on Eliquis - continue as long as able to take po 2. FELICIA/CKD 
a. Monitor UOP 3. DM - insulin 4. Ovarian CA, metastatic, Stage 3c 
a. Dx'd 2012 
b. On palliative chemo 
c. See H&P for Onc hx 
5. DVT 
a. Eliquis 6. Pulm HTN 
7. SHABANA on CPAP at home 
a. As above 8. Hypothyroid 
a. Synthroid 9. Depressions 
a. Zoloft 10. COVID: Rapid +  
a. Steroids b. VitC/Zinc up to 10 days 
c. Not a remdesivir d/t CPAP 
d. As above 11. SBP Goal of: > 90 mmHg 12. MAP Goal of: > 65 mmHg 13. IVFs: prn 
14. Transfusion Trigger (Hgb): <7 g/dL 15. Respiratory Goals: 
a. Chlorhexidine  
b. Aggressive bronchopulmonary hygiene 
c. Incentive spirometry 16. Pulmonary toilet: Duo-Nebs 17. SpO2 Goal: > 92% 18. Keep K>4; Mg>2 19. PT/OT: PT consulted and on board, OT consulted and on board and Speech therapy consulted and on board 20. Discussed Plan of Care/Code Status: Full Code 21. Discussed Care Plan with Bedside RN 
22. Documentation of Current Medications 23. Further as below: 
 
F - Feeding:  Yes po as fareed A - Analgesia: None S - Sedation: N/A 
T - DVT Prophylaxis: Eliquis H - Head of Bed: > 30 Degrees U - Ulcer Prophylaxis: Not at this time G - Glycemic Control: Insulin S - Spontaneous Breathing Trial: N/A 
B - Bowel Regimen: None needed at this time I - Indwelling Catheter: 
 Tubes: None Lines: Peripheral IV Drains: None D - De-escalation of Antibiotics: as above Multidisciplinary Rounds Completed: Yes ABCDEF Bundle/Checklist Completed: 
Yes SPECIAL EQUIPMENT None DISPOSITION Stay in ICU Subjective:  
Progress Note: 2021 Reason for ICU Admission: acute hypoxemic respiratory failure d/t COVID  
 
HPI: Jagjit Woody is a 62 y.o. female who has a PMH of ovarian cancer s/p recent chemotherapy, CKD, urinary retention, GERD, asthma, chronic pain, anemia, depression and anxiety who presented to the ED via EMS with progressively worsening SOB x2 days. She denied fever, chills, N/V/D chest pain, or other constitutional symptoms. Oxygen sats were reported to be 50% on RA at home. She was placed on NRB by EMS and had oxygen sats in the 70s upon arrival to the ED. ABG on NRB was 7.49/29/58. She was transitioned to CPAP 14 100%.   
She was treated with empiric antibiotics. She was found to be COVID positive.  
  
ICU was consulted and she was accepted to the ICU service.  
  
Note: + contrast allergy Overnight Events: No major o/n events reported. Pam CPAP o/n. Back on HiFlo this am. Rec'd lasix w/ good response. H/H down, await repeat. POD: 
* No surgery found * S/P:  
 
 
Active Problem List:  
 
Problem List  Date Reviewed: 4/14/2021 Codes Class Carcinomatosis ([facility] ICD-10-CM: C80.0 ICD-9-CM: 199.0 Controlled type 2 diabetes mellitus without complication, with long-term current use of insulin (HCC) ICD-10-CM: E11.9, Z79.4 ICD-9-CM: 250.00, V58.67 Morbid obesity ([facility] ICD-10-CM: E66.01 
ICD-9-CM: 278.01   
   
 [id number] ICD-10-CM: U07.1 ICD-9-CM: 079.89 Fatigue ICD-10-CM: R53.83 ICD-9-CM: 780.79 SOB (shortness of breath) ICD-10-CM: R06.02 
ICD-9-CM: 786.05 Lymphedema of both lower extremities ICD-10-CM: I89.0 ICD-9-CM: 345.7 S/P ureteral stent placement ICD-10-CM: Z96.0 ICD-9-CM: V45.89 Anemia associated with chemotherapy ICD-10-CM: D64.81, T45.1X5A 
ICD-9-CM: 285.3, E933.1 Mass of chest wall ICD-10-CM: R22.2 ICD-9-CM: 786.6 Hypertension ICD-10-CM: I10 
ICD-9-CM: 401.9  Pain due to malignant neoplasm metastatic to bone [facility]) ICD-10-CM: G89.3, C79.51 
ICD-9-CM: 338.3 Overview Signed 7/18/2018  1:46 PM by MALLIKA Alcocer Mediastinal chest wall mass Diabetes ([facility] ICD-10-CM: E11.9 ICD-9-CM: 250.00 Hypothyroidism ICD-10-CM: E03.9 ICD-9-CM: 244.9 Anemia ICD-10-CM: D64.9 ICD-9-CM: 285.9 Contrast media allergy ICD-10-CM: Z91.041 ICD-9-CM: V15.08 On antineoplastic chemotherapy ICD-10-CM: Z79.899 ICD-9-CM: V58.69 Reactive depression ICD-10-CM: F32.9 ICD-9-CM: 300.4 Type 2 diabetes mellitus with nephropathy (HCC) ICD-10-CM: E11.21 
ICD-9-CM: 250.40, 583.81 Lymphadenopathy, mediastinal ICD-10-CM: R59.0 ICD-9-CM: 785.6 Pulmonary hypertension, mild (HCC) ICD-10-CM: I27.20 ICD-9-CM: 416.8 Renal calculi ICD-10-CM: N20.0 ICD-9-CM: 592.0 Hyperglycemia due to type 2 diabetes mellitus ([facility] ICD-10-CM: E11.65 ICD-9-CM: 250.00 Ovarian cancer ([facility] ICD-10-CM: C56.9 ICD-9-CM: 183.0 Acute deep vein thrombosis (DVT) of proximal vein of right lower extremity (HCC) ICD-10-CM: I82.4Y1 ICD-9-CM: 453.41 Port-A-Cath in place ICD-10-CM: Z95.828 ICD-9-CM: V45.89 Dyslipidemia (high LDL; low HDL) ICD-10-CM: E78.5 ICD-9-CM: 272.4 SHABANA on CPAP ICD-10-CM: G47.33, Z99.89 ICD-9-CM: 327.23, V46.8 Abnormal mammogram ICD-10-CM: R92.8 ICD-9-CM: 793.80 Past Medical History:  
 
 has a past medical history of Adverse effect of anesthesia, Anemia, Arthritis, Asthma, BRCA negative (1/2013), Calculus of kidney, Chronic kidney disease, Chronic pain, CINV (chemotherapy-induced nausea and vomiting) (8/21/2014), Decreased hearing, right, Diabetes ([facility] (Age 46), Environmental allergies, Fibromyalgia, GERD (gastroesophageal reflux disease), pulmonary embolus during pregnancy (1/18/2015), Hydronephrosis due to obstruction of ureter (3/18/2019), Hypertension, Ill-defined condition, Ill-defined condition (2016), Mass of chest wall (05/2018), Morbid obesity ([facility] Murmur, heart, Other and unspecified hyperlipidemia, Ovarian cancer ([facility] (9/2012, 1/2014), Psychiatric disorder, Rectal bleeding, Thromboembolus ([facility] (1993), Thyroid disease, and Unspecified sleep apnea. Past Surgical History:  
 
 has a past surgical history that includes pr breast surgery procedure unlisted; hx gyn (1990 1993); hx hysterectomy (9/2012); hx vascular access (11/4/2015); hx vascular access (Left, 2017); hx urological (Right, 2017); hx orthopaedic (Right, 1990); and hx orthopaedic (Right, 1980'S). Home Medications:  
 
Prior to Admission medications Medication Sig Start Date End Date Taking? Authorizing Provider Eliquis 5 mg tablet TAKE 1 TAB BY MOUTH TWO (2) TIMES A DAY FOR 30 DAYS. 3/1/21   Nieves Day PA-C  
amoxicillin (AMOXIL) 500 mg capsule TAKE 4 CAPSULES BY MOUTH 1 HOUR PRIOR TO APPT 2/12/21   Provider, Historical  
gabapentin (NEURONTIN) 100 mg capsule Take 2 Caps by mouth nightly. Max Daily Amount: 200 mg. Indications: neuropathic pain 2/10/21   Dax Arana PA-C  
dexAMETHasone (DECADRON) 4 mg tablet TAKE 2 TABLETS WITH BREAKFAST THE DAY BEFORE CHEMO AND FOR 2 DAYS AFTER CHEMO 1/28/21   Oracio Ayala MD  
predniSONE (DELTASONE) 50 mg tablet Take 1 tablet 13 hours prior to scan, take 1 tablet 7 hours prior to scan and take 1 tablet one hour prior to scan 1/19/21   Oracio Ayala MD  
omega-3 acid ethyl esters (LOVAZA) 1 gram capsule TAKE 1 CAPSULE BY MOUTH TWICE A DAY 10/6/20   Amrita Kiran MD  
rosuvastatin (CRESTOR) 10 mg tablet Take 10 mg by mouth nightly. Provider, Historical  
doxazosin (CARDURA) 1 mg tablet Take  by mouth daily. Provider, Historical  
doxazosin (CARDURA) 1 mg tablet Take 1 mg by mouth daily. Provider, Historical  
SULFAMETHOXAZOLE-TRIMETHOPRIM PO Take  by mouth two (2) times a day.  7 days    Provider, Historical  
lidocaine-prilocaine (EMLA) topical cream Apply small amount over port area and cover with band aid one hour before chemo 6/30/20   Cynthia Hanna MD  
ondansetron [facility] ODT) 4 mg disintegrating tablet Take 1 Tab by mouth every eight (8) hours as needed for Nausea. Indications: nausea and vomiting caused by cancer drugs 6/30/20   Cynthia Hanna MD  
fluticasone propionate [facility]) 50 mcg/actuation nasal spray 2 SPRAYS IN BOTH NOSTRILS DAILY 2/20/20   Karthikeyan Shine MD  
cycloSPORINE (RESTASIS) 0.05 % dpet  11/12/19   Provider, Historical  
albuterol (PROVENTIL VENTOLIN) 2.5 mg /3 mL (0.083 %) nebu Take 2.5 mg by inhalation. Provider, Historical  
telmisartan (MICARDIS) 20 mg tablet TAKE 1 TABLET BY MOUTH EVERY DAY 10/21/19   Karthikeyan Shine MD  
atorvastatin (LIPITOR) 20 mg tablet  10/7/19   Provider, Historical  
insulin aspart U-100 (NOVOLOG FLEXPEN U-100 INSULIN) 100 unit/mL (3 mL) inpn INJECT 20 UNITS BEFORE EACH MEAL + 4 UNITS FOR EVERY 50 MG/DL ABOVE 150 MG/DL.  UNITS PER DAY 9/11/19   Karthikeyan Shine MD  
LEVEMIR FLEXTOUCH U-100 INSULN 100 unit/mL (3 mL) inpn INJECT 30 UNITS IN AM AND 50 UNITS AT NIGHT. INCREASE AS DIRECTED TO  UNITS/DAY. 8/15/19   Karthikeyan Shine MD  
DEXILANT 60 mg CpDB capsule (delayed release) TAKE 1 CAPSULE BY MOUTH EVERY DAY 6/21/19   Karthikeyan Shine MD  
LINZESS 145 mcg cap capsule TAKE 1 CAPSULE BY MOUTH EVERY DAYY 4/18/19   Karthikeyan Shine MD  
levothyroxine (SYNTHROID) 150 mcg tablet TAKE 1 TABLET BY MOUTH EVERY DAY BEFORE BREAKFAST 4/2/19   Benji Hernández MD  
EPINEPHrine (EPIPEN) 0.3 mg/0.3 mL injection INJECT 0.3 ML BY INTRAMUSCULAR ROUTE ONCE AS NEEDED FOR UP TO 1 DOSE. 1/16/19   Provider, Historical  
azelastine (ASTELIN) 137 mcg (0.1 %) nasal spray 1 [place] by Both Nostrils route daily as needed. Use in each nostril as directed     Provider, Historical  
insulin detemir U-100 (LEVEMIR) 100 unit/mL injection 20 Units by SubCUTAneous route daily (with lunch).     Provider, Historical  
montelukast (SINGULAIR) 10 mg tablet Take 10 mg by mouth nightly. Provider, Historical  
ascorbic acid, vitamin C, (VITAMIN C) 1,000 mg tablet Take 1,000 mg by mouth daily. Provider, Historical  
albuterol (PROVENTIL VENTOLIN) 2.5 mg /3 mL (0.083 %) nebulizer solution 2.5 mg by Nebulization route every four (4) hours as needed for Wheezing. Provider, Historical  
Liraglutide (VICTOZA) 0.6 mg/0.1 mL (18 mg/3 mL) pnij 1.8 mg by SubCUTAneous route daily (with lunch). 3/27/18   Praneeth Iraheta MD  
SYMBICORT 160-4.5 mcg/actuation HFAA Take 2 Puffs by inhalation two (2) times daily as needed. 12/26/17   Nelly Fuller MD  
clonazePAM (KLONOPIN) 0.5 mg tablet Take 0.5 mg by mouth nightly. Provider, Historical  
sertraline (ZOLOFT) 50 mg tablet TAKE 1 TABLET BY MOUTH as needed 2/18/17   Provider, Historical  
cholecalciferol, VITAMIN D3, (VITAMIN D3) 5,000 unit tab tablet Take 5,000 Units by mouth daily. Provider, Historical  
furosemide (LASIX) 40 mg tablet Take 20 mg by mouth every other day. 9/15/15   Provider, Historical  
 
 
Allergies/Social/Family History: Allergies Allergen Reactions  Latex Hives  Carboplatin Other (comments) Patient developed shortness of breath, felt faint, coughing, skin flushed and \"itchy\". This occurred on the patients 8th treatment.  Iodinated Contrast Media Rash 13 hr pre-medication prior to IV contrast.  
Patient has done well with 1 hr pre-medication of (Solu-Medrol) 40mg &  (Benadryl) 50mg.  Lipitor [Atorvastatin] Myalgia  Shellfish Containing Products Hives  Tape [Adhesive] Itching and Other (comments) \"op site-- clear,thin tape\"--caused blister  Tomato Hives  Iodine And Iodide Containing Products Rash  Olmesartan Other (comments)  Losartan Other (comments)  
  headaches  Percocet [Oxycodone-Acetaminophen] Other (comments) Fever; however, current home med Social History Tobacco Use  Smoking status: Never Smoker  Smokeless tobacco: Never Used Substance Use Topics  Alcohol use: Yes Comment: 1 drink per month Family History Problem Relation Age of Onset  Hypertension Mother [place] Arthritis-osteo Mother  Hypertension Father  Arthritis-osteo Father  Cancer Father PROSTATE  COPD Father  Hypertension Brother  Elevated Lipids Brother  Arthritis-osteo Brother  Hypertension Brother  Elevated Lipids Brother  Obesity Brother  Cancer Brother PROSTATE  Anesth Problems Son DELAYED AWAKENING  
 Diabetes Maternal Grandmother  Anesth Problems Paternal Grandmother PATIENT STATES GRANDMOTHER'S HEART STOPPED DURING SURGERY Review of Systems: A comprehensive review of systems was negative except for that written in the HPI. Objective:  
Vital Signs: 
Visit Vitals BP (!) 97/44 Pulse 67 Temp 97.8 °F (36.6 °C) Resp 21 Ht 5' 9\" (1.753 m) Wt 116.1 kg (255 lb 15.3 oz) LMP 2011 SpO2 98% BMI 37.80 kg/m² O2 Flow Rate (L/min): 40 l/min O2 Device: CPAP mask Temp (24hrs), Av.6 °F (37.6 °C), Min:97.8 °F (36.6 °C), Max:101.3 °F (38.5 °C) Intake/Output:  
 
Intake/Output Summary (Last 24 hours) at 2021 4440 Last data filed at 2021 1159 Gross per 24 hour Intake 1590 ml Output 1550 ml Net 40 ml Physical Exam: 
 
General:  Alert, cooperative, well noursished, well developed, appears stated age Eyes:  Sclera anicteric. Pupils equally round and reactive to light. Mouth/Throat: Mucous membranes normal, oral pharynx clear Neck: Supple Lungs:   Diminished, course CV:  Regular rate and rhythm,no murmur, click, rub or gallop Abdomen:   Soft, non-tender. bowel sounds normal. non-distended Extremities: No cyanosis or edema Skin: Skin color, texture, turgor normal. no acute rash or lesions Lymph nodes: Cervical and supraclavicular normal  
Musculoskeletal: No swelling or deformity Lines/Devices:  Intact, no erythema, drainage or tenderness Neuro/Psych: Awake, alert and oriented, MOODY, nonfocal   
 
 
LABS AND  DATA: Personally reviewed Recent Labs 04/18/21 
3956 04/17/21 
0158 WBC 6.1 6.5 HGB 6.0* 7.0*  
HCT 19.2* 22.3*  
 202 Recent Labs 04/18/21 
2873 04/17/21 
0158  135* K 3.9 4.0  
* 102 CO2 24 24 BUN 28* 34* CREA 1.17* 1.71* * 154* CA 7.5* 8.1*  
MG 1.8  --   
PHOS 3.3  --   
 
Recent Labs 04/18/21 
2508 04/17/21 
0158 AP 46 56  
TP 6.1* 7.2 ALB 2.3* 3.0*  
GLOB 3.8 4.2* No results for input(s): INR, PTP, APTT, INREXT in the last 72 hours. Recent Labs 04/17/21 
9628 PHI 7.49* PCO2I 29.0*  
PO2I 58* FIO2I 100 Recent Labs 04/18/21 
2216 04/17/21 
0158 TROIQ <0.05 <0.05 Hemodynamics:  
PAP:   CO:    
Wedge:   CI:    
CVP:    SVR:    
  PVR:    
 
Ventilator Settings: 
Mode Rate Tidal Volume Pressure FiO2 PEEP  
         80 % Peak airway pressure:     
Minute ventilation: 18.3 l/min MEDS: Reviewed Chest X-Ray: CXR Results  (Last 48 hours) 04/17/21 0314  XR CHEST PORT Final result Impression:  Bilateral lower lobe opacification. Narrative:  EXAM: XR CHEST PORT INDICATION: Eval for Infiltrate COMPARISON: February 26 FINDINGS: A portable AP radiograph of the chest was obtained at 0304 hours. The  
rocio catheter seen in place. The patient is on a cardiac monitor. There is  
bilateral lower lobe opacification. The cardiac and mediastinal contours and  
pulmonary vascularity are normal.  The bones and soft tissues are grossly within  
normal limits. Images:  
Reviewed ECHO: 
Reviewed CRITICAL CARE CONSULTANT NOTE I had a face to face encounter with the patient, reviewed and interpreted patient data including clinical events, labs, images, vital signs, I/O's, and examined patient.   I have discussed the case and the plan and management of the patient's care with the consulting services, the bedside nurses and the respiratory therapist.   
 
NOTE OF PERSONAL INVOLVEMENT IN CARE This patient has a high probability of imminent, clinically significant deterioration, which requires the highest level of preparedness to intervene urgently. I participated in the decision-making and personally managed or directed the management of the following life and organ supporting interventions that required my frequent assessment to treat or prevent imminent deterioration. I personally spent 31 minutes of critical care time. This is time spent at this critically ill patient's bedside actively involved in patient care as well as the coordination of care and discussions with the patient's family. This does not include any procedural time which has been billed separately. DO Song [facility] 4/18/2021

## 2021-04-18 NOTE — PROGRESS NOTES
2000, Bedside and Verbal shift change report given to NIKIA Hernandez (oncoming nurse) by Maddy Gonsalez RN (offgoing nurse). Report included the following information SBAR, Kardex, Intake/Output, MAR, Accordion, Recent Results, Med Rec Status and Cardiac Rhythm NSR. Temp;  Afebrile Neuro; Alert & oriented by 4, follow command,  eyes contact, pupils   GCS;  Eye; 4, verbal; 5, motor; 6. 
Reassessment; no changes > sept well overnight, Respiratory; Sat 94-97% on CPAP of 12, , FiO2 80%, pt', RR 29-34, cough not productive, Reassessment; Sat 89-98%, RR 26 while sleeping,  
[id number] Patient asked for urinal > sat maintained 91-97% while turning > after that Heated HFNC at 45L/min 100% applied for oral medication and mouth care > sat drop to 79% > CPAP 12, 100% applied till sat reached 95% then titrated down to 80% > sat at 94%,  
 
Cardiac; NSR, 64-79 B/min, NIBP 105-120/44-54 mmHg, Reassessment; No changes GI; DM Diet > poor appetite with CPAP, Abd obese and pannus, semi soft with active bowel sound, Reassessment;  2 large watery brown BM > NP updated no order for now Renal; Void freely > NO PUREWICK FOR LATEX ALLERGY with adequate urine out put BROWN present. Reassessment;   
 
Skin; edema in BLE +4, scars, right eye redness > seems bloody liner. Looks pale Endo; SSI every 6 hours, Lines; PIV X2. Left side port A cath > not access yet,  
Code; Full. Lab; HBG 7 > 6 > NP ordered Lasix 40 mg IV once and repeat CBC at 1000hr DVT; SCD both legs. Eliquis BID Plan; CPAP & HFNC management > titrate oxygen as tolerated, encourage I/S if able to do, pain and agitation management, follow lab tomorrow,   
 
0700 Bedside and Verbal shift change [facility], RN (oncoming nurse) by Sammi Domingo RN (offgoing nurse). Report included the following information SBAR, Kardex, Intake/Output, MAR, Accordion, Recent Results, Med Rec Status and Cardiac Rhythm NSR.

## 2021-04-19 NOTE — CONSULTS
Palliative Medicine Consult [place]: [phone]) Patient Name: Slime Chavez YOB: 1960 Date of Initial Consult: 4/19/21 Reason for Consult: care decisions Requesting Provider: Jamilah Soto MD  
Primary Care Physician: Charlotte Bishop MD 
 
 SUMMARY:  
Slime Chavez is a 62 y.o. with a past history of ovarian cancer, retention, GERD, asthma, chronic pain, anemia diabetes, chronic kidney disease, depression and anxiety, who was admitted on 4/17/2021 from home with a diagnosis of COVID-19. Current medical issues leading to Palliative Medicine involvement include: decompensating respiratory status. Psychosocial: PALLIATIVE DIAGNOSES:  
1. Acute respiratory failure with hypoxia 2. covid-19 PNA 3. Fever 4. Cough 5. Care decisions PLAN:  
1. Prior to visit, I completed an extensive review of patient's medical records, including medical documentation, vital signs, MARs, and results of various labs and other diagnostics. I also spoke with patient's nurse [facility] and intensivist Dr. Joelle Chicas. 2. Per my discussion with RN [facility], she had a lengthy discussion with pt earlier today about whether or not she would want to be intubated if her condition worsens, and patient told her that she definitely wanted intubation. 3. Goals are clear, will follow on the periphery. 4. Initial consult note routed to primary continuity provider and/or primary health care team members 5. Communicated plan of care with: Palliative [facility] Team 
 
 GOALS OF CARE / TREATMENT PREFERENCES:  
 
GOALS OF CARE: 
  
 
TREATMENT PREFERENCES:  
Code Status: Full Code Advance Care Planning: 
[x] The [facility] Interdisciplinary Team has updated the ACP Navigator with A Fourth Act and Patient Capacity Advance Care Planning 4/17/2021 Patient's Healthcare Decision Maker is: -  
Primary Decision Maker Name -  
Primary Decision Maker Phone Number -  
Primary Decision Maker Relationship to Patient -  
Confirm Advance Directive Yes, on file Patient Would Like to Complete Advance Directive - Does the patient have other document types - Other: As far as possible, the palliative care team has discussed with patient / health care proxy about goals of care / treatment preferences for patient. HISTORY:  
 
History obtained from: chart CHIEF COMPLAINT: SOB 
 
HPI/SUBJECTIVE: The patient is:  
[] Verbal and participatory [] Non-participatory due to:  
 
4/17: BIBA with c/o SOB that has progressively worsened over the past 2 days, satting 50% on RA at home and 70's on NRB. Now on CPAP 14 100%. Clinical Pain Assessment (nonverbal scale for severity on nonverbal patients): Duration: for how long has pt been experiencing pain (e.g., 2 days, 1 month, years) Frequency: how often pain is an issue (e.g., several times per day, once every few days, constant) FUNCTIONAL ASSESSMENT:  
 
Palliative Performance Scale (PPS): PSYCHOSOCIAL/SPIRITUAL SCREENING:  
 
Palliative IDT has assessed this patient for cultural preferences / practices and a referral made as appropriate to needs (Cultural Services, Patient Advocacy, Ethics, etc.) Any spiritual / Judaism concerns: 
[] Yes /  [x] No 
 
Caregiver Burnout: 
[] Yes /  [x] No /  [] No Caregiver Present Anticipatory grief assessment:  
[x] Normal  / [] Maladaptive ESAS Anxiety:  unable to assess due to pt factors ESAS Depression:   unable to assess due to pt factors REVIEW OF SYSTEMS:  
 
Positive and pertinent negative findings in ROS are noted above in HPI. The following systems were [x] reviewed / [] unable to be reviewed as noted in HPI Other findings are noted below. Systems: constitutional, ears/nose/mouth/throat, respiratory, gastrointestinal, genitourinary, musculoskeletal, integumentary, neurologic, psychiatric, endocrine. Positive findings noted below. Modified ESAS Completed by: provider Stool Occurrence(s): 1 PHYSICAL EXAM:  
 
From RN flowsheet: 
Wt Readings from Last 3 Encounters:  
04/17/21 255 lb 15.3 oz (116.1 kg) 04/07/21 272 lb 6.4 oz (123.6 kg) 03/31/21 273 lb (123.8 kg) Blood pressure (!) 103/54, pulse 65, temperature 97.9 °F (36.6 °C), resp. rate (!) 32, height 5' 9\" (1.753 m), weight 255 lb 15.3 oz (116.1 kg), last menstrual period 09/07/2011, SpO2 95 %. Pain Scale 1: Numeric (0 - 10) Pain Intensity 1: 0 Last bowel movement, if known: In order to limit the exposure risk from COVID 19 and to preserve the hospital's limited supply of PPEs, seen through ICU window. HISTORY:  
 
Active Problems: 
  COVID-19 (4/17/2021) Past Medical History:  
Diagnosis Date  Adverse effect of anesthesia   
 sleep apnea cpap machine useage  Anemia  Arthritis DDD low back and knees  Asthma   
 uses albuterol prn only; none x 6 mos. Never hospitalized  BRCA negative 1/2013  Calculus of kidney  Chronic kidney disease   
 kidney stones  Chronic pain   
 knee pain bilat  CINV (chemotherapy-induced nausea and vomiting) 8/21/2014  Decreased hearing, right PATIENT STATES THIS IS DUE TO CHEMOTHERAPY  Diabetes ([address] Age 46 IDDM  Environmental allergies  Fibromyalgia  GERD (gastroesophageal reflux disease)   
 controlled with med  Hx of pulmonary embolus during pregnancy 1/18/2015  Hydronephrosis due to obstruction of ureter 3/18/2019  Hypertension  Ill-defined condition Sepsis 9-2015 -  SOURCE PORT  
 Ill-defined condition 2016  
 chemotherapy  Mass of chest wall 05/2018  Morbid obesity ([address]  Murmur, heart  Other and unspecified hyperlipidemia  Ovarian cancer ([address] 9/2012, 1/2014  
 serous, high grade, stage IIIc. s/p chemotx (has port)  Psychiatric disorder Depression/Anxiety  Rectal bleeding  Thromboembol[address] [address]  POST PARTUM; resolved- PELVIS  Thyroid disease HYPOTHYROID  Unspecified sleep apnea CPAP, Dr. Marco Dockery Past Surgical History:  
Procedure Laterality Date Walter  
  X2  
 HX HYSTERECTOMY  2012 ROULA/BSO, tumor debulking, omentectomy for ovarian cancer  HX ORTHOPAEDIC Right   
 ankle-FX, R  
 HX ORTHOPAEDIC Right   
 FX R ARM  
 HX UROLOGICAL Right 2017 STENT FOR KIDNEY STONES  
 HX VASCULAR ACCESS  2015  
 right chest- port placed  HX VASCULAR ACCESS Left 2017 TETE CATH  
 [place] BREAST SURGERY PROCEDURE UNLISTED Lymph node in left breast 2017 Family History Problem Relation Age of Onset  Hypertension Mother Duffy Arthritis-osteo Mother  Hypertension Father  Arthritis-osteo Father  Cancer Father PROSTATE  COPD Father  Hypertension Brother  Elevated Lipids Brother  Arthritis-osteo Brother  Hypertension Brother  Elevated Lipids Brother  Obesity Brother  Cancer Brother PROSTATE  Anesth Problems Son DELAYED AWAKENING  
 Diabetes Maternal Grandmother  Anesth Problems Paternal Grandmother PATIENT STATES GRANDMOTHER'S HEART STOPPED DURING SURGERY History reviewed, no pertinent family history. Social History Tobacco Use  Smoking status: Never Smoker  Smokeless tobacco: Never Used Substance Use Topics  Alcohol use: Yes Comment: 1 drink per month Allergies Allergen Reactions  Latex Hives  Carboplatin Other (comments) Patient developed shortness of breath, felt faint, coughing, skin flushed and \"itchy\". This occurred on the patients 8th treatment.  Iodinated Contrast Media Rash 13 hr pre-medication prior to IV contrast.  
Patient has done well with 1 hr pre-medication of (Solu-Medrol) 40mg &  (Benadryl) 50mg.  Lipitor [Atorvastatin] Myalgia  Shellfish Containing Products Hives  Tape [Adhesive] Itching and Other (comments) \"op site-- clear,thin tape\"--caused blister  Tomato Hives  Iodine And Iodide Containing Products Rash  Olmesartan Other (comments)  Losartan Other (comments)  
  headaches  Percocet [Oxycodone-Acetaminophen] Other (comments) Fever; however, current home med Current Facility-Administered Medications Medication Dose Route Frequency  0.9% sodium chloride infusion 250 mL  250 mL IntraVENous PRN  
 methylPREDNISolone (PF) (Solu-MEDROL) injection 60 mg  60 mg IntraVENous Q12H And  
 insulin NPH (NOVOLIN N, HUMULIN N) injection 10 Units  10 Units SubCUTAneous Q12H  
 enoxaparin (LOVENOX) injection 120 mg  1 mg/kg SubCUTAneous Q12H  
 arformoteroL (BROVANA) neb solution 15 mcg  15 mcg Nebulization BID RT And  
 budesonide (PULMICORT) 250 mcg/2ml nebulizer susp  500 mcg Nebulization BID RT  
 0.9% sodium chloride infusion 250 mL  250 mL IntraVENous PRN  
 sodium chloride (NS) flush 5-10 mL  5-10 mL IntraVENous PRN  
 sodium chloride (NS) flush 5-10 mL  5-10 mL IntraVENous PRN  zinc sulfate (ZINCATE) 50 mg zinc (220 mg) capsule 1 Cap  1 Cap Oral DAILY  ascorbic acid (vitamin C) (VITAMIN C) tablet 500 mg  500 mg Oral DAILY  sodium chloride (NS) flush 5-40 mL  5-40 mL IntraVENous Q8H  
 sodium chloride (NS) flush 5-40 mL  5-40 mL IntraVENous PRN  
 acetaminophen (TYLENOL) tablet 650 mg  650 mg Oral Q6H PRN Or  
 acetaminophen (TYLENOL) suppository 650 mg  650 mg Rectal Q6H PRN  polyethylene glycol (MIRALAX) packet 17 g  17 g Oral DAILY PRN  
 ondansetron (ZOFRAN) injection 4 mg  4 mg IntraVENous Q6H PRN  
 levothyroxine (SYNTHROID) tablet 150 mcg  150 mcg Oral 6am  
 sertraline (ZOLOFT) tablet 50 mg  50 mg Oral DAILY  insulin lispro (HUMALOG) injection   SubCUTAneous Q6H  
 glucose chewable tablet 16 g  4 Tab Oral PRN  
 dextrose (D50W) injection syrg 12.5-25 g  12.5-25 g IntraVENous PRN  
 glucagon (GLUCAGEN) injection 1 mg  1 mg IntraMUSCular PRN  
 gabapentin (NEURONTIN) capsule 200 mg  200 mg Oral QHS  alcohol 62% (NOZIN) nasal  1 Ampule  1 Ampule Topical Q12H  
 guaiFENesin ER (MUCINEX) tablet 600 mg  600 mg Oral Q12H  
 
 
 
 LAB AND IMAGING FINDINGS:  
 
Lab Results Component Value Date/Time WBC 6.2 04/19/2021 03:14 AM  
 HGB 6.9 (L) 04/19/2021 03:14 AM  
 PLATELET 816 48/45/0252 03:14 AM  
 
Lab Results Component Value Date/Time Sodium 140 04/19/2021 03:14 AM  
 Potassium 4.2 04/19/2021 03:14 AM  
 Chloride 111 (H) 04/19/2021 03:14 AM  
 CO2 26 04/19/2021 03:14 AM  
 BUN 28 (H) 04/19/2021 03:14 AM  
 Creatinine 1.10 (H) 04/19/2021 03:14 AM  
 Calcium 7.6 (L) 04/19/2021 03:14 AM  
 Magnesium 1.9 04/19/2021 03:14 AM  
 Phosphorus 3.0 04/19/2021 03:14 AM  
  
Lab Results Component Value Date/Time Alk. phosphatase 47 04/19/2021 03:14 AM  
 Protein, total 6.0 (L) 04/19/2021 03:14 AM  
 Albumin 2.1 (L) 04/19/2021 03:14 AM  
 Globulin 3.9 04/19/2021 03:14 AM  
 
Lab Results Component Value Date/Time INR 1.1 04/03/2017 10:25 AM  
 Prothrombin time 11.1 04/03/2017 10:25 AM  
 aPTT 23.3 01/16/2015 12:15 PM  
  
Lab Results Component Value Date/Time Iron 65 01/27/2021 01:14 PM  
 TIBC 293 01/27/2021 01:14 PM  
 Iron % saturation 22 01/27/2021 01:14 PM  
 Ferritin 2,740 (H) 04/19/2021 03:14 AM  
  
No results found for: PH, PCO2, PO2 No components found for: [place] Lab Results Component Value Date/Time CK - MB 1.2 04/10/2018 07:33 PM  
  
 
 
   
 
Total time:  
Counseling / coordination time, spent as noted above:  
> 50% counseling / coordination?:  
 
Prolonged service was provided for  []30 min   []75 min in face to face time in the presence of the patient, spent as noted above. Time Start:  
Time End:  
Note: this can only be billed with 03754 (initial) or 92175 (follow up). If multiple start / stop times, list each separately.

## 2021-04-19 NOTE — INTERDISCIPLINARY ROUNDS
Critical care interdisciplinary rounds held on 04/19/2021. Following members present, Pharmacy, Diabetes Treatment, Case Management, Physical Therapy,  Palliative Care and Nutrition. Led by MERCY Romero RN and Dr. Kelle Sinclair. Plan of care discussed. See clinical pathway for plan of care and interventions and desired outcomes.

## 2021-04-19 NOTE — PROGRESS NOTES
[facility] 
 
ICU TEAM Progress Note Name: Adam Goldberg : 1960 MRN: [id number] Date: 2021 Assessment/Plan: 1. Acute hypoxic respiratory failure d/t COVID pna 
a. HiFlo/CPAP/BiPAP as needed. Alternate modalities as fareed.  
b. Bronchodilators 
c. Steroids - increase to SoluMed 60 q12h 
d. Rocephin/Azith completed 2. FELICIA/CKD 
a. Monitor UOP 
b. Unable to measure I/Os - place mcclain 
c. May also need lasix after PRBCs today 3. DM - insulin 4. Ovarian CA, metastatic, Stage 3c 
a. Dx'd 2012 
b. On palliative chemo 
c. See H&P for Onc hx 
5. DVT 
a. Eliquis - change to Lovenox d/t decreased po intake 6. Pulm HTN, h/o 
7. Anemia, acute on chronic 8. SHABANA on CPAP at home 
a. As above 9. Hypothyroid 
a. Synthroid 10. Depression 
a. Zoloft 11. COVID: Rapid +  
a. Steroids b. VitC/Zinc up to 10 days 
c. Not a remdesivir candidate d/t CPAP 
d. As above 12. SBP Goal of: > 90 mmHg 13. MAP Goal of: > 65 mmHg 14. IVFs: prn 
15. Transfusion Trigger (Hgb): <7 g/dL 16. Respiratory Goals: 
a. Chlorhexidine  
b. Aggressive bronchopulmonary hygiene 
c. Incentive spirometry 17. Pulmonary toilet: Duo-Nebs 18. SpO2 Goal: > 92% 19. Keep K>4; Mg>2  
20. PT/OT: PT consulted and on board, OT consulted and on board and Speech therapy consulted and on board 21. Discussed Plan of Care/Code Status: Full Code 22. Discussed Care Plan with Bedside RN 
23. Documentation of Current Medications 24. Further as below: 
 
F - Feeding:  Yes po as fareed A - Analgesia: None S - Sedation: N/A 
T - DVT Prophylaxis: Lovenox H - Head of Bed: > 30 Degrees U - Ulcer Prophylaxis: Not at this time G - Glycemic Control: Insulin S - Spontaneous Breathing Trial: N/A 
B - Bowel Regimen: None needed at this time I - Indwelling Catheter: 
 Tubes: None Lines: Peripheral IV Drains: Mcclain Catheter D - De-escalation of Antibiotics: as above Multidisciplinary Rounds Completed: Yes ABCDEF Bundle/Checklist Completed: 
Yes SPECIAL EQUIPMENT None DISPOSITION Stay in ICU Subjective:  
Progress Note: 4/19/2021 Reason for ICU Admission: acute hypoxemic respiratory failure d/t COVID  
 
HPI: Venus Simpson is a 62 y.o. female who has a PMH of ovarian cancer s/p recent chemotherapy, CKD, urinary retention, GERD, asthma, chronic pain, anemia, depression and anxiety who presented to the ED via EMS with progressively worsening SOB x2 days. She denied fever, chills, N/V/D chest pain, or other constitutional symptoms. Oxygen sats were reported to be 50% on RA at home. She was placed on NRB by EMS and had oxygen sats in the 70s upon arrival to the ED. ABG on NRB was 7.49/29/58. She was transitioned to CPAP 14 100%.   
She was treated with empiric antibiotics. She was found to be COVID positive.  
  
ICU was consulted and she was accepted to the ICU service.  
  
Note: + contrast allergy Overnight Events: No major o/n events reported. Pam CPAP o/n. O2 req's creeping up some. Some hematuria noted. Rec'd PRBCs yest, to again this am.  
 
 
POD: 
* No surgery found * S/P:  
 
 
Active Problem List:  
 
Problem List  Date Reviewed: 4/14/2021 Codes Class Carcinomatosis ([facility] ICD-10-CM: C80.0 ICD-9-CM: 199.0 Controlled type 2 diabetes mellitus without complication, with long-term current use of insulin (HCC) ICD-10-CM: E11.9, Z79.4 ICD-9-CM: 250.00, V58.67 Morbid obesity ([facility] ICD-10-CM: E66.01 
ICD-9-CM: 278.01   
   
 VMUPN-41 ICD-10-CM: U07.1 ICD-9-CM: 079.89 Fatigue ICD-10-CM: R53.83 ICD-9-CM: 780.79 SOB (shortness of breath) ICD-10-CM: R06.02 
ICD-9-CM: 786.05 Lymphedema of both lower extremities ICD-10-CM: I89.0 ICD-9-CM: 113.0 S/P ureteral stent placement ICD-10-CM: Z96.0 ICD-9-CM: V45.89 Anemia associated with chemotherapy ICD-10-CM: D64.81, T45.1X5A 
ICD-9-CM: 285.3, E933.1  Mass of chest wall ICD-10-CM: R22.2 ICD-9-CM: 786.6 Hypertension ICD-10-CM: I10 
ICD-9-CM: 401.9 Pain due to malignant neoplasm metastatic to bone (HCC) ICD-10-CM: G89.3, C79.51 
ICD-9-CM: 338.3 Overview Signed 7/18/2018  1:46 PM by MALLIKA Anders Mediastinal chest wall mass Diabetes ([facility] ICD-10-CM: E11.9 ICD-9-CM: 250.00 Hypothyroidism ICD-10-CM: E03.9 ICD-9-CM: 244.9 Anemia ICD-10-CM: D64.9 ICD-9-CM: 285.9 Contrast media allergy ICD-10-CM: Z91.041 ICD-9-CM: V15.08 On antineoplastic chemotherapy ICD-10-CM: Z79.899 ICD-9-CM: V58.69 Reactive depression ICD-10-CM: F32.9 ICD-9-CM: 300.4 Type 2 diabetes mellitus with nephropathy (HCC) ICD-10-CM: E11.21 
ICD-9-CM: 250.40, 583.81 Lymphadenopathy, mediastinal ICD-10-CM: R59.0 ICD-9-CM: 785.6 Pulmonary hypertension, mild (HCC) ICD-10-CM: I27.20 ICD-9-CM: 416.8 Renal calculi ICD-10-CM: N20.0 ICD-9-CM: 592.0 Hyperglycemia due to type 2 diabetes mellitus ([facility] ICD-10-CM: E11.65 ICD-9-CM: 250.00 Ovarian cancer ([facility] ICD-10-CM: C56.9 ICD-9-CM: 183.0 Acute deep vein thrombosis (DVT) of proximal vein of right lower extremity (HCC) ICD-10-CM: I82.4Y1 ICD-9-CM: 453.41 Port-A-Cath in place ICD-10-CM: Z95.828 ICD-9-CM: V45.89 Dyslipidemia (high LDL; low HDL) ICD-10-CM: E78.5 ICD-9-CM: 272.4 SHABANA on CPAP ICD-10-CM: G47.33, Z99.89 ICD-9-CM: 327.23, V46.8 Abnormal mammogram ICD-10-CM: R92.8 ICD-9-CM: 793.80 Past Medical History:  
 
 has a past medical history of Adverse effect of anesthesia, Anemia, Arthritis, Asthma, BRCA negative (1/2013), Calculus of kidney, Chronic kidney disease, Chronic pain, CINV (chemotherapy-induced nausea and vomiting) (8/21/2014), Decreased hearing, right, Diabetes ([facility] (Age 46), Environmental allergies, Fibromyalgia, GERD (gastroesophageal reflux disease), pulmonary embolus during pregnancy (1/18/2015), Hydronephrosis due to obstruction of ureter (3/18/2019), Hypertension, Ill-defined condition, Ill-defined condition (2016), Mass of chest wall (05/2018), Morbid obesity ([facility] Murmur, heart, Other and unspecified hyperlipidemia, Ovarian cancer ([facility] (9/2012, 1/2014), Psychiatric disorder, Rectal bleeding, Thromboembolus ([facility] (1993), Thyroid disease, and Unspecified sleep apnea. Past Surgical History:  
 
 has a past surgical history that includes pr breast surgery procedure unlisted; hx gyn (1990 1993); hx hysterectomy (9/2012); hx vascular access (11/4/2015); hx vascular access (Left, 2017); hx urological (Right, 2017); hx orthopaedic (Right, 1990); and hx orthopaedic (Right, 1980'S). Home Medications:  
 
Prior to Admission medications Medication Sig Start Date End Date Taking? Authorizing Provider Eliquis 5 mg tablet TAKE 1 TAB BY MOUTH TWO (2) TIMES A DAY FOR 30 DAYS. 3/1/21   Lyndol Skill, Lorry Siemens, PA-C  
amoxicillin (AMOXIL) 500 mg capsule TAKE 4 CAPSULES BY MOUTH 1 HOUR PRIOR TO APPT 2/12/21   Provider, Historical  
gabapentin (NEURONTIN) 100 mg capsule Take 2 Caps by mouth nightly. Max Daily Amount: 200 mg. Indications: neuropathic pain 2/10/21   Dax Arana PA-C  
dexAMETHasone (DECADRON) 4 mg tablet TAKE 2 TABLETS WITH BREAKFAST THE DAY BEFORE CHEMO AND FOR 2 DAYS AFTER CHEMO 1/28/21   Cristhian Larios MD  
predniSONE (DELTASONE) 50 mg tablet Take 1 tablet 13 hours prior to scan, take 1 tablet 7 hours prior to scan and take 1 tablet one hour prior to scan 1/19/21   Cristhian Larios MD  
omega-3 acid ethyl esters (LOVAZA) 1 gram capsule TAKE 1 CAPSULE BY MOUTH TWICE A DAY 10/6/20   Helen Huggins MD  
rosuvastatin (CRESTOR) 10 mg tablet Take 10 mg by mouth nightly. Provider, Historical  
doxazosin (CARDURA) 1 mg tablet Take  by mouth daily. Provider, Historical  
doxazosin (CARDURA) 1 mg tablet Take 1 mg by mouth daily.     Provider, Historical  
SULFAMETHOXAZOLE-TRIMETHOPRIM PO Take  by mouth two (2) times a day. 7 days    Provider, Historical  
lidocaine-prilocaine (EMLA) topical cream Apply small amount over port area and cover with band aid one hour before chemo 6/30/20   Oracio Ayala MD  
ondansetron (ZOFRAN ODT) 4 mg disintegrating tablet Take 1 Tab by mouth every eight (8) hours as needed for Nausea. Indications: nausea and vomiting caused by cancer drugs 6/30/20   Oracio Ayala MD  
fluticasone propionate [facility]) 50 mcg/actuation nasal spray 2 SPRAYS IN BOTH NOSTRILS DAILY 2/20/20   Amrita Kiran MD  
cycloSPORINE (RESTASIS) 0.05 % dpet  11/12/19   Provider, Historical  
albuterol (PROVENTIL VENTOLIN) 2.5 mg /3 mL (0.083 %) nebu Take 2.5 mg by inhalation. Provider, Historical  
telmisartan (MICARDIS) 20 mg tablet TAKE 1 TABLET BY MOUTH EVERY DAY 10/21/19   Amrita Kiran MD  
atorvastatin (LIPITOR) 20 mg tablet  10/7/19   Provider, Historical  
insulin aspart U-100 (NOVOLOG FLEXPEN U-100 INSULIN) 100 unit/mL (3 mL) inpn INJECT 20 UNITS BEFORE EACH MEAL + 4 UNITS FOR EVERY 50 MG/DL ABOVE 150 MG/DL.  UNITS PER DAY 9/11/19   Amrita Kiran MD  
LEVEMIR FLEXTOUCH U-100 INSULN 100 unit/mL (3 mL) inpn INJECT 30 UNITS IN AM AND 50 UNITS AT NIGHT. INCREASE AS DIRECTED TO  UNITS/DAY. 8/15/19   Amrita Kiran MD  
DEXILANT 60 mg CpDB capsule (delayed release) TAKE 1 CAPSULE BY MOUTH EVERY DAY 6/21/19   Amrita Kiran MD  
LINZESS 145 mcg cap capsule TAKE 1 CAPSULE BY MOUTH EVERY DAYY 4/18/19   Amrita Kiran MD  
levothyroxine (SYNTHROID) 150 mcg tablet TAKE 1 TABLET BY MOUTH EVERY DAY BEFORE BREAKFAST 4/2/19   Rand Rizo MD  
EPINEPHrine (EPIPEN) 0.3 mg/0.3 mL injection INJECT 0.3 ML BY INTRAMUSCULAR ROUTE ONCE AS NEEDED FOR UP TO 1 DOSE. 1/16/19   Provider, Historical  
azelastine (ASTELIN) 137 mcg (0.1 %) nasal spray 1 [place] by Both Nostrils route daily as needed.  Use in each nostril as directed     Provider, Historical  
insulin detemir U-100 (LEVEMIR) 100 unit/mL injection 20 Units by SubCUTAneous route daily (with lunch). Provider, Historical  
montelukast (SINGULAIR) 10 mg tablet Take 10 mg by mouth nightly. Provider, Historical  
ascorbic acid, vitamin C, (VITAMIN C) 1,000 mg tablet Take 1,000 mg by mouth daily. Provider, Historical  
albuterol (PROVENTIL VENTOLIN) 2.5 mg /3 mL (0.083 %) nebulizer solution 2.5 mg by Nebulization route every four (4) hours as needed for Wheezing. Provider, Historical  
Liraglutide (VICTOZA) 0.6 mg/0.1 mL (18 mg/3 mL) pnij 1.8 mg by SubCUTAneous route daily (with lunch). 3/27/18   Jose Schwartz MD  
SYMBICORT 160-4.5 mcg/actuation HFAA Take 2 Puffs by inhalation two (2) times daily as needed. 12/26/17   Nelly Hein MD  
clonazePAM (KLONOPIN) 0.5 mg tablet Take 0.5 mg by mouth nightly. Provider, Historical  
sertraline (ZOLOFT) 50 mg tablet TAKE 1 TABLET BY MOUTH as needed 2/18/17   Provider, Historical  
cholecalciferol, VITAMIN D3, (VITAMIN D3) 5,000 unit tab tablet Take 5,000 Units by mouth daily. Provider, Historical  
furosemide (LASIX) 40 mg tablet Take 20 mg by mouth every other day. 9/15/15   Provider, Historical  
 
 
Allergies/Social/Family History: Allergies Allergen Reactions  Latex Hives  Carboplatin Other (comments) Patient developed shortness of breath, felt faint, coughing, skin flushed and \"itchy\". This occurred on the patients 8th treatment.  Iodinated Contrast Media Rash 13 hr pre-medication prior to IV contrast.  
Patient has done well with 1 hr pre-medication of (Solu-Medrol) 40mg &  (Benadryl) 50mg.  Lipitor [Atorvastatin] Myalgia  Shellfish Containing Products Hives  Tape [Adhesive] Itching and Other (comments) \"op site-- clear,thin tape\"--caused blister  Tomato Hives  Iodine And Iodide Containing Products Rash  Olmesartan Other (comments)  Losartan Other (comments)  
  headaches  Percocet [Oxycodone-Acetaminophen] Other (comments) Fever; however, current home med Social History Tobacco Use  Smoking status: Never Smoker  Smokeless tobacco: Never Used Substance Use Topics  Alcohol use: Yes Comment: 1 drink per month Family History Problem Relation Age of Onset  Hypertension Mother Jose Yip Arthritis-osteo Mother  Hypertension Father  Arthritis-osteo Father  Cancer Father PROSTATE  COPD Father  Hypertension Brother  Elevated Lipids Brother  Arthritis-osteo Brother  Hypertension Brother  Elevated Lipids Brother  Obesity Brother  Cancer Brother PROSTATE  Anesth Problems Son DELAYED AWAKENING  
 Diabetes Maternal Grandmother  Anesth Problems Paternal Grandmother PATIENT STATES GRANDMOTHER'S HEART STOPPED DURING SURGERY Review of Systems: A comprehensive review of systems was negative except for that written in the HPI. Objective:  
Vital Signs: 
Visit Vitals BP (!) 93/51 Pulse (!) 53 Temp 98.2 °F (36.8 °C) Resp 29 Ht 5' 9\" (1.753 m) Wt 116.1 kg (255 lb 15.3 oz) LMP 2011 SpO2 96% BMI 37.80 kg/m² O2 Flow Rate (L/min): 40 l/min O2 Device: CPAP mask Temp (24hrs), Av.1 °F (36.7 °C), Min:97.9 °F (36.6 °C), Max:98.5 °F (36.9 °C) Intake/Output:  
 
Intake/Output Summary (Last 24 hours) at 2021 0725 Last data filed at 2021 1814 Gross per 24 hour Intake 814.6 ml Output  Net 814.6 ml Physical Exam: 
 
General:  Alert, cooperative, well noursished, well developed, appears stated age Eyes:  Sclera anicteric. Pupils equally round and reactive to light. Mouth/Throat: Mucous membranes normal, oral pharynx clear Neck: Supple Lungs:   Diminished, course CV:  Regular rate and rhythm,no murmur, click, rub or gallop Abdomen:   Soft, non-tender. bowel sounds normal. non-distended Extremities: No cyanosis or edema Skin: Skin color, texture, turgor normal. no acute rash or lesions Lymph nodes: Cervical and supraclavicular normal  
Musculoskeletal: No swelling or deformity Lines/Devices:  Intact, no erythema, drainage or tenderness Neuro/Psych: Awake, alert and oriented, MOODY, nonfocal   
 
 
LABS AND  DATA: Personally reviewed Recent Labs 04/19/21 
0314 04/18/21 
1950 04/18/21 
1018 WBC 6.2  --  7.1 HGB 6.9* 7.1* 6.3* HCT 21.7* 22.4* 20.4*   --  175 Recent Labs 04/19/21 
2693 04/18/21 
5157  139  
K 4.2 3.9 * 109* CO2 26 24 BUN 28* 28* CREA 1.10* 1.17* * 161* CA 7.6* 7.5* MG 1.9 1.8 PHOS 3.0 3.3 Recent Labs 04/19/21 
6155 04/18/21 
0014 AP 47 46  
TP 6.0* 6.1* ALB 2.1* 2.3*  
GLOB 3.9 3.8 No results for input(s): INR, PTP, APTT, INREXT, INREXT in the last 72 hours. Recent Labs 04/17/21 
3692 PHI 7.49* PCO2I 29.0*  
PO2I 58* FIO2I 100 Recent Labs 04/18/21 
8049 04/17/21 
0158 TROIQ <0.05 <0.05 Hemodynamics:  
PAP:   CO:    
Wedge:   CI:    
CVP:    SVR:    
  PVR:    
 
Ventilator Settings: 
Mode Rate Tidal Volume Pressure FiO2 PEEP  
         100 % Peak airway pressure:     
Minute ventilation: 14.5 l/min MEDS: Reviewed Chest X-Ray: CXR Results  (Last 48 hours) None Images:  
Reviewed ECHO: 1/16/2020 Result status: Final result · Normal cavity size and systolic function (ejection fraction normal). Mild concentric hypertrophy. Estimated left ventricular ejection fraction is 55 - 60%. Visually measured ejection fraction. No regional wall motion abnormality noted. · Mildly dilated left atrium. · Mild aortic valve sclerosis. · Trace mitral valve regurgitation is present. CRITICAL CARE CONSULTANT NOTE I had a face to face encounter with the patient, reviewed and interpreted patient data including clinical events, labs, images, vital signs, I/O's, and examined patient.   I have discussed the case and the plan and management of the patient's care with the consulting services, the bedside nurses and the respiratory therapist.   
 
NOTE OF PERSONAL INVOLVEMENT IN CARE This patient has a high probability of imminent, clinically significant deterioration, which requires the highest level of preparedness to intervene urgently. I participated in the decision-making and personally managed or directed the management of the following life and organ supporting interventions that required my frequent assessment to treat or prevent imminent deterioration. I personally spent 36 minutes of critical care time. This is time spent at this critically ill patient's bedside actively involved in patient care as well as the coordination of care and discussions with the patient's family. This does not include any procedural time which has been billed separately. DO Song [facility] 4/19/2021

## 2021-04-19 NOTE — PROGRESS NOTES
1920- Change of shift report received from Andrew Esquivel, [facility] (offgoing nurse). Report included the following information SBAR, Kardex, Intake/Ouptut, [facility] & Cardiac Rhythm NSR. Dual skin assessment performed. 1945- H&H sent to lab. 2000- Assessment complete; see flowsheets for details. Pt is alert & oriented x4, follows commands & moves all extremities spontaneously. Pt is currently on CPAP with o2 sats >90%; lung sounds diminished. 2250- Assisted patient with bedpan. Urine yellow/orange & blood tinged. Pt states that her urine is often blood tinged/brown due to renal stents. 0000- Reassessment complete; no new changes at this time. 5- Patient reports feeling nauseated during rocephin infusion. 4mg IV zofran given. 0415- AM hemoglobin 6.9. NP notified; order placed for 1 unit PRBCs. 0500- Assisted patient with bedpan. Moderate amount of blood in urine. Notified NP; new orders placed for urinalysis. 0700- Change of shift report given to Andrew Esquivel, RN (oncoming nurse).

## 2021-04-19 NOTE — PROGRESS NOTES
0700  Report from Ana Rascon  
 
0800  Assessment complete 0815  1 Unit RBC's started  Attempted to remove CPAP and try high flow for nourishment and oral care. Sat's did drop to 70's and then 59% despite high flow 40 lpm 100%, went back to CPAP and with in minutes sat's in mid 80's and then back in mid 90's 
 
0930  Spoke with Dr Kip Thakur reference bloody urine no need for urinalysis today ok to put mcclain in. If bleeding continues will get a urology consult but for now will hold due to positive covid 1000  Asked for bedpan PCT assisted 1120  Tried High flow so patient could get oral hydration  Sat's slowly dropped with in 15 minutes to low 70's however patient does not complain of sob. Sat's then dropped to low 60's went back to CPAP and within minutes back in 80's and then high 90's 
 
1130  Mcclain inserted see flow sheet positive for hematuria   CXR completed 1200  Assessment complete no changes 96 [id number]  Blood finished 
 
1400  Patient asleep on CPAP with sat's high 90's 
 
1600  Assessment complete no changes 1700  High flow for oral hydration again dropped sat's ended up back on CPAP 
 
1745  Spoke with Dr Kip Thakur reference hematuria will continue to monitor patient remains hemodynamically stable 1815  Patient called for bedpan large watery brown BM  While turning and cleaning patient sat up c/o hypoxia and ripped CPAP off, able to redirect and put CPAP back on sat's back up in mid 80's 
 
1900  Report to [employer]

## 2021-04-20 NOTE — PROGRESS NOTES
[facility] 
 
ICU TEAM Progress Note Name: Connie Bustamante : 1960 MRN: [id number] Date: 2021 Assessment/Plan: 1. Acute hypoxic respiratory failure d/t COVID pna 
a. HiFlo/CPAP/BiPAP as needed. Alternate modalities as fareed.  
b. Bronchodilators 
c. SoluMed 60 q12h 
d. Rocephin/Azith completed 
e. Keep I/Os negative to even as able 2. FELICIA/CKD 
a. Good UOP 
b. Monitor 3. DM - insulin 4. Ovarian CA, metastatic, Stage 3c 
a. Dx'd 2012 
b. On palliative chemo 
c. See H&P for Onc hx 
5. DVT 
a. Eliquis - change to Lovenox d/t decreased po intake 6. Pulm HTN, h/o 
7. Anemia, acute on chronic 
a. S/p 1u PRBCs yest 
8. SHABANA on CPAP at home 
a. As above 9. Hypothyroid 
a. Synthroid 10. Depression 
a. Zoloft 11. COVID: Rapid +  
a. Steroids b. VitC/Zinc up to 10 days 
c. Not a remdesivir candidate d/t CPAP 
d. As above 12. SBP Goal of: > 90 mmHg 13. MAP Goal of: > 65 mmHg 14. IVFs: prn 
15. Transfusion Trigger (Hgb): <7 g/dL 16. Respiratory Goals: 
a. Chlorhexidine  
b. Aggressive bronchopulmonary hygiene 
c. Incentive spirometry 17. Pulmonary toilet: Duo-Nebs 18. SpO2 Goal: > 92% 19. Keep K>4; Mg>2  
20. PT/OT: PT consulted and on board, OT consulted and on board and Speech therapy consulted and on board 21. Discussed Plan of Care/Code Status: Full Code 22. Discussed Care Plan with Bedside RN 
23. Documentation of Current Medications 24. Further as below: 
 
F - Feeding:  Yes po as fareed A - Analgesia: None S - Sedation: N/A 
T - DVT Prophylaxis: Lovenox H - Head of Bed: > 30 Degrees U - Ulcer Prophylaxis: Not at this time G - Glycemic Control: Insulin S - Spontaneous Breathing Trial: N/A 
B - Bowel Regimen: None needed at this time I - Indwelling Catheter: 
 Tubes: None Lines: Peripheral IV and Port Drains: Childers Catheter D - De-escalation of Antibiotics: as above Multidisciplinary Rounds Completed: Yes ABCDEF Bundle/Checklist Completed: Yes 
 
SPECIAL EQUIPMENT None DISPOSITION Stay in ICU Subjective:  
Progress Note: 4/20/2021 Reason for ICU Admission: acute hypoxemic respiratory failure d/t COVID  
 
HPI: Tereza Alvarez is a 62 y.o. female who has a PMH of ovarian cancer s/p recent chemotherapy, CKD, urinary retention, GERD, asthma, chronic pain, anemia, depression and anxiety who presented to the ED via EMS with progressively worsening SOB x2 days. She denied fever, chills, N/V/D chest pain, or other constitutional symptoms. Oxygen sats were reported to be 50% on RA at home. She was placed on NRB by EMS and had oxygen sats in the 70s upon arrival to the ED. ABG on NRB was 7.49/29/58. She was transitioned to CPAP 14 100%.   
She was treated with empiric antibiotics. She was found to be COVID positive.  
  
ICU was consulted and she was accepted to the ICU service.  
  
Note: + contrast allergy Overnight Events: No major o/n events reported. Fareed CPAP, but fareed HiFlo less and less. Poor po intake. Good UOP. Rec'd 1u PRBCs yest.  
 
 
POD: 
* No surgery found * S/P:  
 
 
Active Problem List:  
 
Problem List  Date Reviewed: 4/14/2021 Codes Class Carcinomatosis ([facility] ICD-10-CM: C80.0 ICD-9-CM: 199.0 Controlled type 2 diabetes mellitus without complication, with long-term current use of insulin (HCC) ICD-10-CM: E11.9, Z79.4 ICD-9-CM: 250.00, V58.67 Morbid obesity ([facility] ICD-10-CM: E66.01 
ICD-9-CM: 278.01   
   
 [id number] ICD-10-CM: U07.1 ICD-9-CM: 079.89 Fatigue ICD-10-CM: R53.83 ICD-9-CM: 780.79 SOB (shortness of breath) ICD-10-CM: R06.02 
ICD-9-CM: 786.05 Lymphedema of both lower extremities ICD-10-CM: I89.0 ICD-9-CM: 045.0 S/P ureteral stent placement ICD-10-CM: Z96.0 ICD-9-CM: V45.89 Anemia associated with chemotherapy ICD-10-CM: D64.81, T45.1X5A 
ICD-9-CM: 285.3, E933.1 Mass of chest wall ICD-10-CM: R22.2 ICD-9-CM: 786.6  Hypertension ICD-10-CM: I10 
ICD-9-CM: 401.9 Pain due to malignant neoplasm metastatic to bone (HCC) ICD-10-CM: G89.3, C79.51 
ICD-9-CM: 338.3 Overview Signed 7/18/2018  1:46 PM by MALLIKA Horne Mediastinal chest wall mass Diabetes ([facility] ICD-10-CM: E11.9 ICD-9-CM: 250.00 Hypothyroidism ICD-10-CM: E03.9 ICD-9-CM: 244.9 Anemia ICD-10-CM: D64.9 ICD-9-CM: 285.9 Contrast media allergy ICD-10-CM: Z91.041 ICD-9-CM: V15.08 On antineoplastic chemotherapy ICD-10-CM: Z79.899 ICD-9-CM: V58.69 Reactive depression ICD-10-CM: F32.9 ICD-9-CM: 300.4 Type 2 diabetes mellitus with nephropathy (HCC) ICD-10-CM: E11.21 
ICD-9-CM: 250.40, 583.81 Lymphadenopathy, mediastinal ICD-10-CM: R59.0 ICD-9-CM: 785.6 Pulmonary hypertension, mild (HCC) ICD-10-CM: I27.20 ICD-9-CM: 416.8 Renal calculi ICD-10-CM: N20.0 ICD-9-CM: 592.0 Hyperglycemia due to type 2 diabetes mellitus ([facility] ICD-10-CM: E11.65 ICD-9-CM: 250.00 Ovarian cancer ([facility] ICD-10-CM: C56.9 ICD-9-CM: 183.0 Acute deep vein thrombosis (DVT) of proximal vein of right lower extremity (HCC) ICD-10-CM: I82.4Y1 ICD-9-CM: 453.41 Port-A-Cath in place ICD-10-CM: Z95.828 ICD-9-CM: V45.89 Dyslipidemia (high LDL; low HDL) ICD-10-CM: E78.5 ICD-9-CM: 272.4 SHABANA on CPAP ICD-10-CM: G47.33, Z99.89 ICD-9-CM: 327.23, V46.8 Abnormal mammogram ICD-10-CM: R92.8 ICD-9-CM: 793.80 Past Medical History:  
 
 has a past medical history of Adverse effect of anesthesia, Anemia, Arthritis, Asthma, BRCA negative (1/2013), Calculus of kidney, Chronic kidney disease, Chronic pain, CINV (chemotherapy-induced nausea and vomiting) (8/21/2014), Decreased hearing, right, Diabetes ([facility] (Age 46), Environmental allergies, Fibromyalgia, GERD (gastroesophageal reflux disease), pulmonary embolus during pregnancy (1/18/2015), Hydronephrosis due to obstruction of ureter (3/18/2019), Hypertension, Ill-defined condition, Ill-defined condition (2016), Mass of chest wall (05/2018), Morbid obesity ([facility] Murmur, heart, Other and unspecified hyperlipidemia, Ovarian cancer ([facility] (9/2012, 1/2014), Psychiatric disorder, Rectal bleeding, Thromboembolus ([facility] (1993), Thyroid disease, and Unspecified sleep apnea. Past Surgical History:  
 
 has a past surgical history that includes pr breast surgery procedure unlisted; hx gyn (1990 1993); hx hysterectomy (9/2012); hx vascular access (11/4/2015); hx vascular access (Left, 2017); hx urological (Right, 2017); hx orthopaedic (Right, 1990); and hx orthopaedic (Right, 1980'S). Home Medications:  
 
Prior to Admission medications Medication Sig Start Date End Date Taking? Authorizing Provider  
albuterol (PROVENTIL HFA, VENTOLIN HFA, PROAIR HFA) 90 mcg/actuation inhaler Take 2 Puffs by inhalation every four (4) hours as needed for Wheezing. Yes Provider, Historical  
furosemide (Lasix) 20 mg tablet Take 20 mg by mouth two (2) times a day. Yes Provider, Historical  
Eliquis 5 mg tablet TAKE 1 TAB BY MOUTH TWO (2) TIMES A DAY FOR 30 DAYS. 3/1/21  Yes Daniela Madden PA-C  
omega-3 acid ethyl esters (LOVAZA) 1 gram capsule TAKE 1 CAPSULE BY MOUTH TWICE A DAY 10/6/20  Yes Kina Amezcua MD  
rosuvastatin (CRESTOR) 10 mg tablet Take 10 mg by mouth nightly. Yes Provider, Historical  
doxazosin (CARDURA) 1 mg tablet Take 1 mg by mouth daily. Yes Provider, Historical  
LEVEMIR FLEXTOUCH U-100 INSULN 100 unit/mL (3 mL) inpn INJECT 30 UNITS IN AM AND 50 UNITS AT NIGHT. INCREASE AS DIRECTED TO  UNITS/DAY. 8/15/19  Yes Kina Amezcua MD  
levothyroxine (SYNTHROID) 150 mcg tablet TAKE 1 TABLET BY MOUTH EVERY DAY BEFORE BREAKFAST 4/2/19  Yes Jazmine Brandt MD  
albuterol (PROVENTIL VENTOLIN) 2.5 mg /3 mL (0.083 %) nebulizer solution 2.5 mg by Nebulization route every four (4) hours as needed for Wheezing.    Yes Provider, Historical  
SYMBICORT 160-4.5 mcg/actuation HFAA Take 2 Puffs by inhalation two (2) times daily as needed. 12/26/17  Yes Appa Alfonso Haskins MD  
gabapentin (NEURONTIN) 100 mg capsule Take 2 Caps by mouth nightly. Max Daily Amount: 200 mg. Indications: neuropathic pain 2/10/21   Dax Arana PA-C  
dexAMETHasone (DECADRON) 4 mg tablet TAKE 2 TABLETS WITH BREAKFAST THE DAY BEFORE CHEMO AND FOR 2 DAYS AFTER CHEMO 1/28/21   Kenny Lucio MD  
lidocaine-prilocaine (EMLA) topical cream Apply small amount over port area and cover with band aid one hour before chemo 6/30/20   Kenny Lucio MD  
ondansetron (ZOFRAN ODT) 4 mg disintegrating tablet Take 1 Tab by mouth every eight (8) hours as needed for Nausea. Indications: nausea and vomiting caused by cancer drugs 6/30/20   Kenny Lucio MD  
EPINEPHrine (EPIPEN) 0.3 mg/0.3 mL injection INJECT 0.3 ML BY INTRAMUSCULAR ROUTE ONCE AS NEEDED FOR UP TO 1 DOSE. 1/16/19   Provider, Historical  
azelastine (ASTELIN) 137 mcg (0.1 %) nasal spray 1 [place] by Both Nostrils route daily as needed. Use in each nostril as directed     Provider, Historical  
ascorbic acid, vitamin C, (VITAMIN C) 1,000 mg tablet Take 1,000 mg by mouth daily. Provider, Historical  
cholecalciferol, VITAMIN D3, (VITAMIN D3) 5,000 unit tab tablet Take 5,000 Units by mouth daily. Provider, Historical  
 
 
Allergies/Social/Family History: Allergies Allergen Reactions  Latex Hives  Carboplatin Other (comments) Patient developed shortness of breath, felt faint, coughing, skin flushed and \"itchy\". This occurred on the patients 8th treatment.  Iodinated Contrast Media Rash 13 hr pre-medication prior to IV contrast.  
Patient has done well with 1 hr pre-medication of (Solu-Medrol) 40mg &  (Benadryl) 50mg.  Lipitor [Atorvastatin] Myalgia  Shellfish Containing Products Hives  Tape [Adhesive] Itching and Other (comments)   \"op site-- clear,thin tape\"--caused blister  Tomato Hives  Iodine And Iodide Containing Products Rash  Olmesartan Other (comments)  Losartan Other (comments)  
  headaches  Percocet [Oxycodone-Acetaminophen] Other (comments) Fever; however, current home med Social History Tobacco Use  Smoking status: Never Smoker  Smokeless tobacco: Never Used Substance Use Topics  Alcohol use: Yes Comment: 1 drink per month Family History Problem Relation Age of Onset  Hypertension Mother Preet Em Arthritis-osteo Mother  Hypertension Father  Arthritis-osteo Father  Cancer Father PROSTATE  COPD Father  Hypertension Brother  Elevated Lipids Brother  Arthritis-osteo Brother  Hypertension Brother  Elevated Lipids Brother  Obesity Brother  Cancer Brother PROSTATE  Anesth Problems Son DELAYED AWAKENING  
 Diabetes Maternal Grandmother  Anesth Problems Paternal Grandmother PATIENT STATES GRANDMOTHER'S HEART STOPPED DURING SURGERY Review of Systems: A comprehensive review of systems was negative except for that written in the HPI. Objective:  
Vital Signs: 
Visit Vitals /69 Pulse (!) 53 Temp 98.1 °F (36.7 °C) Resp 27 Ht 5' 9\" (1.753 m) Wt 116.1 kg (255 lb 15.3 oz) LMP 2011 SpO2 96% BMI 37.80 kg/m² O2 Flow Rate (L/min): 40 l/min O2 Device: CPAP mask Temp (24hrs), Av.2 °F (36.8 °C), Min:97.9 °F (36.6 °C), Max:98.5 °F (36.9 °C) Intake/Output:  
 
Intake/Output Summary (Last 24 hours) at 2021 0515 Last data filed at 2021 0400 Gross per 24 hour Intake 1382.5 ml Output 1280 ml Net 102.5 ml Physical Exam: 
 
General:  Alert, cooperative, well noursished, well developed, appears stated age Eyes:  Sclera anicteric. Pupils equally round and reactive to light. Mouth/Throat: Mucous membranes normal, oral pharynx clear Neck: Supple Lungs:   Diminished, course CV:  Regular rate and rhythm,no murmur, click, rub or gallop Abdomen:   Soft, non-tender. bowel sounds normal. non-distended Extremities: No cyanosis or edema Skin: Skin color, texture, turgor normal. no acute rash or lesions Lymph nodes: Cervical and supraclavicular normal  
Musculoskeletal: No swelling or deformity Lines/Devices:  Intact, no erythema, drainage or tenderness Neuro/Psych: Awake, alert and oriented, MOODY, nonfocal   
 
 
LABS AND  DATA: Personally reviewed Recent Labs  
  04/20/21 0412 04/19/21 
0314 WBC 6.6 6.2 HGB 8.1* 6.9*  
HCT 25.5* 21.7*  
 174 Recent Labs  
  04/20/21 0412 04/19/21 
0314  140  
K 4.3 4.2 * 111* CO2 26 26 BUN 29* 28* CREA 0.97 1.10* * 174* CA 8.2* 7.6*  
MG 2.2 1.9 PHOS 2.5* 3.0 Recent Labs  
  04/20/21 0412 04/19/21 
0314 AP 58 47  
TP 6.6 6.0* ALB 2.3* 2.1*  
GLOB 4.3* 3.9 No results for input(s): INR, PTP, APTT, INREXT, INREXT in the last 72 hours. No results for input(s): PHI, PCO2I, PO2I, FIO2I in the last 72 hours. Recent Labs 04/18/21 
5521 TROIQ <0.05 Hemodynamics:  
PAP:   CO:    
Wedge:   CI:    
CVP:    SVR:    
  PVR:    
 
Ventilator Settings: 
Mode Rate Tidal Volume Pressure FiO2 PEEP  
         100 % Peak airway pressure:     
Minute ventilation: 14.5 l/min MEDS: Reviewed Chest X-Ray: CXR Results  (Last 48 hours) 04/19/21 1151  XR CHEST PORT Final result Impression:  Slightly increased patchy bilateral airspace disease, compatible  
with worsening COVID pneumonia. Narrative:  INDICATION: Hypoxia. COVID positive. COMPARISON: 4/17/2021 FINDINGS: AP portable imaging of the chest performed at 11:34 AM demonstrates a  
stable cardiomediastinal silhouette. Left subclavian Port-A-Cath is unchanged in  
position. Diffuse bilateral patchy airspace disease is slightly increased.   
Degenerative changes are seen in the shoulders and thoracic spine. Images:  
Reviewed ECHO: 1/16/2020 Result status: Final result · Normal cavity size and systolic function (ejection fraction normal). Mild concentric hypertrophy. Estimated left ventricular ejection fraction is 55 - 60%. Visually measured ejection fraction. No regional wall motion abnormality noted. · Mildly dilated left atrium. · Mild aortic valve sclerosis. · Trace mitral valve regurgitation is present. CRITICAL CARE CONSULTANT NOTE I had a face to face encounter with the patient, reviewed and interpreted patient data including clinical events, labs, images, vital signs, I/O's, and examined patient. I have discussed the case and the plan and management of the patient's care with the consulting services, the bedside nurses and the respiratory therapist.   
 
NOTE OF PERSONAL INVOLVEMENT IN CARE This patient has a high probability of imminent, clinically significant deterioration, which requires the highest level of preparedness to intervene urgently. I participated in the decision-making and personally managed or directed the management of the following life and organ supporting interventions that required my frequent assessment to treat or prevent imminent deterioration. I personally spent 36 minutes of critical care time. This is time spent at this critically ill patient's bedside actively involved in patient care as well as the coordination of care and discussions with the patient's family. This does not include any procedural time which has been billed separately. DO Song Cuello Critical Care 4/20/2021

## 2021-04-20 NOTE — INTERDISCIPLINARY ROUNDS
Interdisciplinary team rounds were held 4/20/2021 with the following team members:Care Management, Diabetes Treatment Specialist, Nursing, Nutrition, Pharmacy, Physical Therapy, Physician, Respiratory Therapy and Clinical Coordinator. Plan of care discussed. See clinical pathway and/or care plan for interventions and desired outcomes.

## 2021-04-20 NOTE — PROGRESS NOTES
3531: Verbal shift change report given to Lexi Andrade RN (oncoming nurse) by Isamar Cardenas RN (offgoing nurse). Report included the following information SBAR, Kardex, ED Summary, Intake/Output, MAR, Recent Results and Cardiac Rhythm NSR.  
 
0730: Assessment completed, see flowsheets. Pt alert and oriented. Lungs diminished upper and lower fields, non-productive cough. 0915: Trial pt on HFNC to attempt PO morning meds; pt sat's dropped into mid 40s, pt c/o shob and dyspnea, pt placed back on CPAP mask. Sat's now 92%. RN to update MD, pt to remain NPO at this time. 1010: ID rounds held with CCU staff and provider [facility]. Orders received for increased NPH dose, LR at 75/hr. Staff discussed possible placement of dobhoff feeding tube, however pt SPO2 drops too quickly for placement. Discussion held surrounding possible start of TPN tomorrow if see if pt intubated then start trophic feeds. 1230: Pt given full CHG bath and clean gown, linens. Reassessment completed, no changes from previous assessment. RN had full discussion with pt surrounding pt wishes, should pt need more oxygen requirements than CPAP mask. RN explained process of intubation, how staff could make pt comfortable prior to and during intubation. Pt stated \"I would want that. I'm a fighter. \"  
 
[phone]: MD [facility] made aware of elevated SBP this afternoon; orders received for prn hydralazine. 1454: Hydralazine given prn /84. 
 
1530: Reassessment completed, no changes from previous assessment. 1840: Per pt request, pt son updated via telephone. 1900: End of Shift Note Bedside shift change report given to Kareen Warner RN (oncoming nurse) by Eusebia Harrison (offgoing nurse). Report included the following information SBAR, Kardex, ED Summary, Intake/Output, MAR, Recent Results and Cardiac Rhythm NSR, SB Shift worked:  7a-7p Shift summary and any significant changes:  
  Pt trialed on HFNC briefly, placed back on CPAP for immediate desaturation. Prn hydralazine added for SBP >160. Concerns for physician to address:  None at this time. Zone phone for oncoming shift:   NA Activity: 
Activity Level: Bed Rest 
Number times ambulated in hallways past shift: 0 Number of times OOB to chair past shift: 0 Cardiac:  
Cardiac Monitoring: Yes     
Cardiac Rhythm: Normal sinus rhythm Access:  
Current line(s): PIV Genitourinary:  
Urinary status: mcclain Respiratory:  
O2 Device: CPAP mask Chronic home O2 use?: NO Incentive spirometer at bedside: N/A 
  
GI: 
Last Bowel Movement Date: 04/19/21 Current diet:  No diet orders on file Passing flatus: YES Tolerating current diet: YES Pain Management:  
Patient states pain is manageable on current regimen: YES Skin: 
Delroy Score: 14 Interventions: speciality bed, float heels and internal/external urinary devices Patient Safety: 
Fall Score: Total Score: 2 Interventions: bed/chair alarm High Fall Risk: Yes Length of Stay: 
Expected LOS: - - - Actual LOS: 3 [place] End

## 2021-04-20 NOTE — PROGRESS NOTES
1930   Received report from [facility], [address] [facility].   
 
2030  Assessment as per flow. Patient asked for bedpan but did not have a BM. Able to assist with turning in the bed with sats briefly down to low/mid 80's and RR 30's. 
 
2230  Patient able to reposition herself. She stays in a high Combs's position but readjusts her legs. Used [place] pull to get her higher in bed. 0030  No change in assessment. Patient tolerates off mask for brief periods for hydration and mask adjustment. 0430  No change in assessment. 0630  VSS.   Labs reviewed by NP 
 
4330  Report to Federico Espino, [facility]

## 2021-04-20 NOTE — PROGRESS NOTES
Pharmacy Automatic Renal Dosing Protocol - Antimicrobials Indication for Antimicrobials: UTI Current Regimen of Each Antimicrobial: 
Vancomycin 2000 mg loading dose followed by 1000 mg q12h (Start Date ; Day # 1) Goal Level: VANCOMYCIN TROUGH GOAL RANGE Vancomycin Trough: 10 - 15 mcg/mL  (AUC: 400 - 600 mg/hr/Liter/day) Date Dose & Interval Measured (mcg/mL) Extrapolated (mcg/mL) Date & time of next level:  before 00:00 dose Significant Cultures:  
 Urine - Enterococcus faecalis >100,000 preliminary (staph epidermidis no longer listed) Labs: 
Recent Labs  
  21 
0412 21 
0314 21 
1018 21 
0313 CREA 0.97 1.10*  --  1.17* BUN 29* 28*  --  28* WBC 6.6 6.2 7.1 6.1 Temp (24hrs), Av.8 °F (36.6 °C), Min:96.2 °F (35.7 °C), Max:98.5 °F (36.9 °C) Is the Patient on Dialysis? No 
 
Creatinine Clearance (mL/min):  
CrCl (Actual Body Weight): 114.5 CrCl (Adjusted Body Weight): 84.9 CrCl (Ideal Body Weight): 65.3 Impression/Plan:  
Initiate vancomycin 2000 mg loading dose followed by 1000 mg q12h for estimated trough of 14.9,  Sensitivities to follow Antimicrobial stop date TBD Pharmacy will follow daily and adjust medications as appropriate for renal function and/or serum levels. Thank you, Christa Vasquez

## 2021-04-21 NOTE — PROGRESS NOTES
3305: Verbal shift change report given to Quentin Cee RN (oncoming nurse) by Jeremy Azul RN (offgoing nurse). Report included the following information SBAR, Kardex, ED Summary, Intake/Output, MAR, Recent Results and Cardiac Rhythm NSR, SB.  
 
0830: Assessment completed, see flowsheets. Pt trialed again for PO meds this am, able to tolerate PO but saturations dropped to 50s shortly afterwards. Mouth care completed, pt placed back on CPAP mask. SPO2 now 95%. Pt repositioned in bed, tolerated fairly. Remains alert and oriented, follows commands. 1035: ID rounds held with CCU staff and provider [facility]. Orders received for increased NPH dose and PICC line and TPN recommendations from dietary. 1130: Reassessment completed, no changes from previous assessment. 1430: Pt assisted to dangle legs on side of bed with Rn/1 assist. Pt encouraged to to ROM exercises while sitting, tolerated well. Repositioned back to bed.  
 
1500: RN spoke with [facility], staff Hermes Ly RN; approval to access port for TPN requirements while here in CCU.  
 
1550: Left chest port-a-cath accessed for TPN administration and additional IV access. Previous CXR confirmed to show port in satisfactory position. Aeseptic technique used by RN to access; capped with ETOH cap. Reassessment completed 1600, no changes from previous assessment aside from newly accessed port. 1900: End of Shift Note Bedside shift change report given to [facility] [address], RN (oncoming nurse) by Alejandra Mcclelland (offgoing nurse). Report included the following information SBAR, Kardex, ED Summary, Intake/Output, MAR, Recent Results and Cardiac Rhythm NSR, SB Shift worked:  7a-7p Shift summary and any significant changes:  
  Pt dangled legs on side of bed and participated in ROM exercises today while on CPAP mask. Port now accessed and saline locked for possible TPN administration. Concerns for physician to address:  None at this time.   
  
Zone phone for oncoming shift:   NA Activity: 
Activity Level: Dangle Side of Bed Number times ambulated in hallways past shift: 0 Number of times OOB to chair past shift: 0 Cardiac:  
Cardiac Monitoring: Yes     
Cardiac Rhythm: Normal sinus rhythm Access:  
Current line(s): PIV and port Genitourinary:  
Urinary status: mcclain Respiratory:  
O2 Device: CPAP mask Chronic home O2 use?: NO Incentive spirometer at bedside: N/A 
  
GI: 
Last Bowel Movement Date: 04/20/21 Current diet:  No diet orders on file Passing flatus: YES Tolerating current diet: NO 
  
 
Pain Management:  
Patient states pain is manageable on current regimen: YES Skin: 
Delroy Score: 15 Interventions: speciality bed, float heels and internal/external urinary devices Patient Safety: 
Fall Score: Total Score: 3 Interventions: bed/chair alarm High Fall Risk: Yes Length of Stay: 
Expected LOS: 5d 9h 
Actual LOS: 4 Marlise Fret

## 2021-04-21 NOTE — PROGRESS NOTES
[facility] 
 
ICU TEAM Progress Note Name: Vonnie Lee : 1960 MRN: [id number] Date: 2021 Assessment/Plan: 1. Acute hypoxic respiratory failure d/t COVID pna 
a. HiFlo/CPAP/BiPAP as needed. Alternate modalities as fareed.  
b. Has been essentialy CPAP dependent > 48h 
c. Bronchodilators 
d. SoluMed 60 q12h 
e. Rocephin/Azith completed 
f. Keep I/Os even to negative as able 2. FELICIA/CKD 
a. Adequate UOP 
b. Monitor 3. DM - insulin 4. Ovarian CA, metastatic, Stage 3c  
a. Dx'd 2012 
b. On palliative chemo 
c. See H&P for Onc hx 
5. DVT 
a. Eliquis - change to Lovenox d/t decreased po intake 6. Pulm HTN, h/o 
7. Anemia, acute on chronic 
a. Transfuse prn 
8. SHABANA on CPAP at home 
a. As above 9. Hypothyroid 
a. Synthroid 10. Depression 
a. Zoloft 11. GOC - have asked Palliative care to see 
a. Given pt's underlying comorbidities, should she progress to the point of requiring intubation, her mortality would approach 100%. 12. COVID: Rapid +  
a. Steroids b. VitC/Zinc up to 10 days 
c. Not a remdesivir candidate d/t CPAP 
d. As above 13. SBP Goal of: > 90 mmHg 14. MAP Goal of: > 65 mmHg 15. IVFs: prn 
16. Transfusion Trigger (Hgb): <7 g/dL 17. Respiratory Goals: 
a. Chlorhexidine  
b. Aggressive bronchopulmonary hygiene 
c. Incentive spirometry 18. Pulmonary toilet: Duo-Nebs 19. SpO2 Goal: > 92% 20. Keep K>4; Mg>2  
21. PT/OT: PT consulted and on board, OT consulted and on board and Speech therapy consulted and on board 22. Discussed Plan of Care/Code Status: Full Code 23. Discussed Care Plan with Bedside RN 
24. Documentation of Current Medications 25. Further as below: 
 
F - Feeding:  Yes po as fareed A - Analgesia: None S - Sedation: N/A 
T - DVT Prophylaxis: Lovenox H - Head of Bed: > 30 Degrees U - Ulcer Prophylaxis: Not at this time G - Glycemic Control: Insulin S - Spontaneous Breathing Trial: N/A 
B - Bowel Regimen: None needed at this time 
I - Indwelling Catheter: 
 Tubes: None Lines: Peripheral IV and Port Drains: Childers Catheter D - De-escalation of Antibiotics: as above Multidisciplinary Rounds Completed: Yes ABCDEF Bundle/Checklist Completed: 
Yes SPECIAL EQUIPMENT None DISPOSITION Stay in ICU Subjective:  
Progress Note: 4/21/2021 Reason for ICU Admission: acute hypoxemic respiratory failure d/t COVID  
 
HPI: Kevin Osorio is a 62 y.o. female who has a PMH of ovarian cancer s/p recent chemotherapy, CKD, urinary retention, GERD, asthma, chronic pain, anemia, depression and anxiety who presented to the ED via EMS with progressively worsening SOB x2 days. She denied fever, chills, N/V/D chest pain, or other constitutional symptoms. Oxygen sats were reported to be 50% on RA at home. She was placed on NRB by EMS and had oxygen sats in the 70s upon arrival to the ED. ABG on NRB was 7.49/29/58. She was transitioned to CPAP 14 100%.   
She was treated with empiric antibiotics. She was found to be COVID positive.  
  
ICU was consulted and she was accepted to the ICU service.  
  
Note: + contrast allergy Overnight Events: No major o/n events reported. Fareed CPAP, but fareed HiFlo less and less. Poor po intake. Adequate UOP 
 
 
POD: 
* No surgery found * S/P:  
 
 
Active Problem List:  
 
Problem List  Date Reviewed: 4/14/2021 Codes Class Carcinomatosis ([facility] ICD-10-CM: C80.0 ICD-9-CM: 199.0 Controlled type 2 diabetes mellitus without complication, with long-term current use of insulin (HCC) ICD-10-CM: E11.9, Z79.4 ICD-9-CM: 250.00, V58.67 Morbid obesity ([facility] ICD-10-CM: E66.01 
ICD-9-CM: 278.01   
   
 EOKVM-01 ICD-10-CM: U07.1 ICD-9-CM: 079.89 Fatigue ICD-10-CM: R53.83 ICD-9-CM: 780.79 SOB (shortness of breath) ICD-10-CM: R06.02 
ICD-9-CM: 786.05 Lymphedema of both lower extremities ICD-10-CM: I89.0 ICD-9-CM: 889.5  S/P ureteral stent placement ICD-10-CM: Z96.0 ICD-9-CM: V45.89 Anemia associated with chemotherapy ICD-10-CM: D64.81, T45.1X5A 
ICD-9-CM: 285.3, E933.1 Mass of chest wall ICD-10-CM: R22.2 ICD-9-CM: 786.6 Hypertension ICD-10-CM: I10 
ICD-9-CM: 401.9 Pain due to malignant neoplasm metastatic to bone (HCC) ICD-10-CM: G89.3, C79.51 
ICD-9-CM: 338.3 Overview Signed 7/18/2018  1:46 PM by MALLIKA Acevedo Mediastinal chest wall mass Diabetes ([facility] ICD-10-CM: E11.9 ICD-9-CM: 250.00 Hypothyroidism ICD-10-CM: E03.9 ICD-9-CM: 244.9 Anemia ICD-10-CM: D64.9 ICD-9-CM: 285.9 Contrast media allergy ICD-10-CM: Z91.041 ICD-9-CM: V15.08 On antineoplastic chemotherapy ICD-10-CM: Z79.899 ICD-9-CM: V58.69 Reactive depression ICD-10-CM: F32.9 ICD-9-CM: 300.4 Type 2 diabetes mellitus with nephropathy (HCC) ICD-10-CM: E11.21 
ICD-9-CM: 250.40, 583.81 Lymphadenopathy, mediastinal ICD-10-CM: R59.0 ICD-9-CM: 785.6 Pulmonary hypertension, mild (HCC) ICD-10-CM: I27.20 ICD-9-CM: 416.8 Renal calculi ICD-10-CM: N20.0 ICD-9-CM: 592.0 Hyperglycemia due to type 2 diabetes mellitus ([facility] ICD-10-CM: E11.65 ICD-9-CM: 250.00 Ovarian cancer ([facility] ICD-10-CM: C56.9 ICD-9-CM: 183.0 Acute deep vein thrombosis (DVT) of proximal vein of right lower extremity (HCC) ICD-10-CM: I82.4Y1 ICD-9-CM: 453.41 Port-A-Cath in place ICD-10-CM: Z95.828 ICD-9-CM: V45.89 Dyslipidemia (high LDL; low HDL) ICD-10-CM: E78.5 ICD-9-CM: 272.4 SHABANA on CPAP ICD-10-CM: G47.33, Z99.89 ICD-9-CM: 327.23, V46.8 Abnormal mammogram ICD-10-CM: R92.8 ICD-9-CM: 793.80 Past Medical History:  
 
 has a past medical history of Adverse effect of anesthesia, Anemia, Arthritis, Asthma, BRCA negative (1/2013), Calculus of kidney, Chronic kidney disease, Chronic pain, CINV (chemotherapy-induced nausea and vomiting) (8/21/2014), Decreased hearing, right, Diabetes ([facility] (Age 46), Environmental allergies, Fibromyalgia, GERD (gastroesophageal reflux disease), pulmonary embolus during pregnancy (1/18/2015), Hydronephrosis due to obstruction of ureter (3/18/2019), Hypertension, Ill-defined condition, Ill-defined condition (2016), Mass of chest wall (05/2018), Morbid obesity ([facility] Murmur, heart, Other and unspecified hyperlipidemia, Ovarian cancer ([facility] (9/2012, 1/2014), Psychiatric disorder, Rectal bleeding, Thromboembolus ([facility] (1993), Thyroid disease, and Unspecified sleep apnea. Past Surgical History:  
 
 has a past surgical history that includes pr breast surgery procedure unlisted; hx gyn (1990 1993); hx hysterectomy (9/2012); hx vascular access (11/4/2015); hx vascular access (Left, 2017); hx urological (Right, 2017); hx orthopaedic (Right, 1990); and hx orthopaedic (Right, 1980'S). Home Medications:  
 
Prior to Admission medications Medication Sig Start Date End Date Taking? Authorizing Provider  
albuterol (PROVENTIL HFA, VENTOLIN HFA, PROAIR HFA) 90 mcg/actuation inhaler Take 2 Puffs by inhalation every four (4) hours as needed for Wheezing. Yes Provider, Historical  
furosemide (Lasix) 20 mg tablet Take 20 mg by mouth two (2) times a day. Yes Provider, Historical  
Eliquis 5 mg tablet TAKE 1 TAB BY MOUTH TWO (2) TIMES A DAY FOR 30 DAYS. 3/1/21  Yes Kathrine Madden PA-C  
omega-3 acid ethyl esters (LOVAZA) 1 gram capsule TAKE 1 CAPSULE BY MOUTH TWICE A DAY 10/6/20  Yes Nils Titus MD  
rosuvastatin (CRESTOR) 10 mg tablet Take 10 mg by mouth nightly. Yes Provider, Historical  
doxazosin (CARDURA) 1 mg tablet Take 1 mg by mouth daily. Yes Provider, Historical  
LEVEMIR FLEXTOUCH U-100 INSULN 100 unit/mL (3 mL) inpn INJECT 30 UNITS IN AM AND 50 UNITS AT NIGHT. INCREASE AS DIRECTED TO  UNITS/DAY.  8/15/19  Yes Nils Titus MD  
levothyroxine (SYNTHROID) 150 mcg tablet TAKE 1 TABLET BY MOUTH EVERY DAY BEFORE BREAKFAST 4/2/19  Yes Bill Pena MD  
albuterol (PROVENTIL VENTOLIN) 2.5 mg /3 mL (0.083 %) nebulizer solution 2.5 mg by Nebulization route every four (4) hours as needed for Wheezing. Yes Provider, Historical  
SYMBICORT 160-4.5 mcg/actuation HFAA Take 2 Puffs by inhalation two (2) times daily as needed. 12/26/17  Yes Nelly Villarreal MD  
gabapentin (NEURONTIN) 100 mg capsule Take 2 Caps by mouth nightly. Max Daily Amount: 200 mg. Indications: neuropathic pain 2/10/21   Dax Arana PA-C  
dexAMETHasone (DECADRON) 4 mg tablet TAKE 2 TABLETS WITH BREAKFAST THE DAY BEFORE CHEMO AND FOR 2 DAYS AFTER CHEMO 1/28/21   Angelic Almeida MD  
lidocaine-prilocaine (EMLA) topical cream Apply small amount over port area and cover with band aid one hour before chemo 6/30/20   Angelic Almeida MD  
ondansetron (ZOFRAN ODT) 4 mg disintegrating tablet Take 1 Tab by mouth every eight (8) hours as needed for Nausea. Indications: nausea and vomiting caused by cancer drugs 6/30/20   Angelic Almeida MD  
EPINEPHrine (EPIPEN) 0.3 mg/0.3 mL injection INJECT 0.3 ML BY INTRAMUSCULAR ROUTE ONCE AS NEEDED FOR UP TO 1 DOSE. 1/16/19   Provider, Historical  
azelastine (ASTELIN) 137 mcg (0.1 %) nasal spray 1 [place] by Both Nostrils route daily as needed. Use in each nostril as directed     Provider, Historical  
ascorbic acid, vitamin C, (VITAMIN C) 1,000 mg tablet Take 1,000 mg by mouth daily. Provider, Historical  
cholecalciferol, VITAMIN D3, (VITAMIN D3) 5,000 unit tab tablet Take 5,000 Units by mouth daily. Provider, Historical  
 
 
Allergies/Social/Family History: Allergies Allergen Reactions  Latex Hives  Carboplatin Other (comments) Patient developed shortness of breath, felt faint, coughing, skin flushed and \"itchy\". This occurred on the patients 8th treatment.  Iodinated Contrast Media Rash   13 hr pre-medication prior to IV contrast.  
Patient has done well with 1 hr pre-medication of (Solu-Medrol) 40mg &  (Benadryl) 50mg.  Lipitor [Atorvastatin] Myalgia  Shellfish Containing Products Hives  Tape [Adhesive] Itching and Other (comments) \"op site-- clear,thin tape\"--caused blister  Tomato Hives  Iodine And Iodide Containing Products Rash  Olmesartan Other (comments)  Losartan Other (comments)  
  headaches  Percocet [Oxycodone-Acetaminophen] Other (comments) Fever; however, current home med Social History Tobacco Use  Smoking status: Never Smoker  Smokeless tobacco: Never Used Substance Use Topics  Alcohol use: Yes Comment: 1 drink per month Family History Problem Relation Age of Onset  Hypertension Mother [facility] Arthritis-osteo Mother  Hypertension Father  Arthritis-osteo Father  Cancer Father PROSTATE  COPD Father  Hypertension Brother  Elevated Lipids Brother  Arthritis-osteo Brother  Hypertension Brother  Elevated Lipids Brother  Obesity Brother  Cancer Brother PROSTATE  Anesth Problems Son DELAYED AWAKENING  
 Diabetes Maternal Grandmother  Anesth Problems Paternal Grandmother PATIENT STATES GRANDMOTHER'S HEART STOPPED DURING SURGERY Review of Systems: A comprehensive review of systems was negative except for that written in the HPI. Objective:  
Vital Signs: 
Visit Vitals BP (!) 129/59 Pulse (!) 48 Temp 97.9 °F (36.6 °C) Resp 22 Ht 5' 9\" (1.753 m) Wt 112.3 kg (247 lb 9.2 oz) LMP 2011 SpO2 95% BMI 36.56 kg/m² O2 Flow Rate (L/min): 40 l/min O2 Device: CPAP mask Temp (24hrs), Av.9 °F (36.6 °C), Min:97.8 °F (36.6 °C), Max:98.1 °F (36.7 °C) Intake/Output:  
 
Intake/Output Summary (Last 24 hours) at 2021 1027 Last data filed at 2021 0700 Gross per 24 hour Intake 2432.5 ml Output 1790 ml Net 642.5 ml Physical Exam: 
 
General:  Alert, cooperative, well noursished, well developed, appears stated age Eyes:  Sclera anicteric. Pupils equally round and reactive to light. Mouth/Throat: Mucous membranes normal, oral pharynx clear Neck: Supple Lungs:   Diminished, course CV:  Regular rate and rhythm,no murmur, click, rub or gallop Abdomen:   Soft, non-tender. bowel sounds normal. non-distended Extremities: No cyanosis or edema Skin: Skin color, texture, turgor normal. no acute rash or lesions Lymph nodes: Cervical and supraclavicular normal  
Musculoskeletal: No swelling or deformity Lines/Devices:  Intact, no erythema, drainage or tenderness Neuro/Psych: Awake, alert and oriented, MOODY, nonfocal   
 
 
LABS AND  DATA: Personally reviewed Recent Labs  
  04/21/21 0449 04/20/21 
9070 WBC 11.1* 6.6 HGB 8.5* 8.1* HCT 26.8* 25.5*  
 187 Recent Labs  
  04/21/21 0449 04/20/21 
6245  140  
K 4.7 4.3 * 110* CO2 26 26 BUN 27* 29* CREA 1.06* 0.97 * 295* CA 8.5 8.2* MG 2.4 2.2 PHOS 2.6 2.5* Recent Labs  
  04/21/21 0449 04/20/21 
4401 AP 62 58 TP 6.7 6.6 ALB 2.3* 2.3*  
GLOB 4.4* 4.3* No results for input(s): INR, PTP, APTT, INREXT, INREXT in the last 72 hours. No results for input(s): PHI, PCO2I, PO2I, FIO2I in the last 72 hours. No results for input(s): CPK, CKMB, TROIQ, BNPP in the last 72 hours. Hemodynamics:  
PAP:   CO:    
Wedge:   CI:    
CVP:    SVR:    
  PVR:    
 
Ventilator Settings: 
Mode Rate Tidal Volume Pressure FiO2 PEEP  
         100 % Peak airway pressure:     
Minute ventilation: 32.5 l/min MEDS: Reviewed Chest X-Ray: CXR Results  (Last 48 hours) 04/19/21 1151  XR CHEST PORT Final result Impression:  Slightly increased patchy bilateral airspace disease, compatible  
with worsening COVID pneumonia. Narrative:  INDICATION: Hypoxia. COVID positive. COMPARISON: 4/17/2021 FINDINGS: AP portable imaging of the chest performed at 11:34 AM demonstrates a  
stable cardiomediastinal silhouette. Left subclavian Port-A-Cath is unchanged in  
position. Diffuse bilateral patchy airspace disease is slightly increased. Degenerative changes are seen in the shoulders and thoracic spine. Images:  
Reviewed ECHO: 1/16/2020 Result status: Final result · Normal cavity size and systolic function (ejection fraction normal). Mild concentric hypertrophy. Estimated left ventricular ejection fraction is 55 - 60%. Visually measured ejection fraction. No regional wall motion abnormality noted. · Mildly dilated left atrium. · Mild aortic valve sclerosis. · Trace mitral valve regurgitation is present. CRITICAL CARE CONSULTANT NOTE I had a face to face encounter with the patient, reviewed and interpreted patient data including clinical events, labs, images, vital signs, I/O's, and examined patient. I have discussed the case and the plan and management of the patient's care with the consulting services, the bedside nurses and the respiratory therapist.   
 
NOTE OF PERSONAL INVOLVEMENT IN CARE This patient has a high probability of imminent, clinically significant deterioration, which requires the highest level of preparedness to intervene urgently. I participated in the decision-making and personally managed or directed the management of the following life and organ supporting interventions that required my frequent assessment to treat or prevent imminent deterioration. I personally spent 36 minutes of critical care time. This is time spent at this critically ill patient's bedside actively involved in patient care as well as the coordination of care and discussions with the patient's family. This does not include any procedural time which has been billed separately. Hermon Bernheim, DO [facility] 4/21/2021

## 2021-04-21 NOTE — PROGRESS NOTES
Pharmacy Automatic Renal Dosing Protocol - Antimicrobials Indication for Antimicrobials: UTI Current Regimen of Each Antimicrobial: 
Vancomycin 2000 mg loading dose followed by 1000 mg q12h (Start Date ; Day # 2) Previous Antimicrobial Therapy: 
Azithromycin 500 mg q24h (Start Date ; Day # 3) Ceftriaxone 2 g q24h (Start Date ; Day # 2) Goal Level: VANCOMYCIN TROUGH GOAL RANGE Vancomycin Trough: 10 - 15 mcg/mL  (AUC: 400 - 600 mg/hr/Liter/day) Date Dose & Interval Measured (mcg/mL) Extrapolated (mcg/mL)  
  1000 mg Q12H 14.3 16.6 Date & time of next level:  before 00:00 dose Significant Cultures:  
 Urine - Enterococcus faecalis >100,000 preliminary (staph epidermidis no longer listed) Labs: 
Recent Labs  
  21 
0449 21 
0412 21 
6557 CREA 1.06* 0.97 1.10* BUN 27* 29* 28* WBC 11.1* 6.6 6.2 Temp (24hrs), Av.6 °F (36.4 °C), Min:97.1 °F (36.2 °C), Max:97.9 °F (36.6 °C) Is the Patient on Dialysis? No 
 
Creatinine Clearance (mL/min):  
CrCl (Actual Body Weight): 101.3 CrCl (Adjusted Body Weight): 76.4 CrCl (Ideal Body Weight): 59.7 Impression/Plan:  
Vancomycin trough 16.6 mcg/ml on 1000 mg Q12. Continue same. Check level on Friday. E. faecalis susceptible to vanc Level today with noon dose Antimicrobial stop date TBD Pharmacy will follow daily and adjust medications as appropriate for renal function and/or serum levels.  
 
Thank you, 
Preston Branch, [facility]

## 2021-04-21 NOTE — PROGRESS NOTES
Comprehensive Nutrition Assessment Type and Reason for Visit: Initial, NPO/clear liquid Nutrition Recommendations/Plan:  
TPN recommendations:  
 Day 1) Start D20, 6.4% AA @ 42mL/h Day 2) If BG <200mg/dL and lytes WNL, advance to 63mL/h Day 3) If BG <200mg/dL and lytes WNL, advance to Goal Rate of 83mL/h + 250mL 20% lipids 3 x week (provides 2078kcals/127gPro/398gDextrose) Nutrition Assessment:   Pt admitted with COVID19. PMH: GERD, CKD, DM, ovarian CA + mets (recent chemo). Chart reviewed, case discussed during CCU rounds. Pt is CPAP dependent and unable to interview on the phone. MST negative for poor appetite or wt loss PTA. Pt was made NPO yesterday but PO intake has been poor since admission. She is too unstable off of O2 to place a dobbhoff (explored this yesterday, she desats too quickly). Plan for PICC placement today and likely initiation of TPN tonight. Provided recommendations above. Pt is moderate refeeding risk based on poor/no intake x 4-5 days since admission, but no reported poor appetite or wt loss prior. BG have been in the 200-300 range, diabetes team is following and making insulin recommendations. Malnutrition Assessment: 
Malnutrition Status:    UTD Estimated Daily Nutrient Needs: 
Energy (kcal): MSJ 2100 (1763 x 1.2); Weight Used for Energy Requirements: Current Protein (g): 99-132g (1.5-2gPro/kg IBW); Weight Used for Protein Requirements: Ideal 
Fluid (ml/day): 1800mL or per MD; Method Used for Fluid Requirements: Standard renal 
 
 
Nutrition Related Findings:  Meds: Vitamin C, humalog, solumedrol, insulin NPH, zinc, vancomycin, [email or web address]. Edema: trace/pitting-BLE. BM 4/20 Wounds:   
None Current Nutrition Therapies: 
No diet orders on file Anthropometric Measures: 
· Height:  5' 9\" (175.3 cm) · Current Body Wt:  112.3 kg (247 lb 9.2 oz) · Ideal Body Wt:  145 lbs:  170.7 % · BMI Category:  Obese class 2 (BMI 35.0-39. 9) Nutrition Diagnosis:  
· Inadequate protein-energy intake related to catabolic illness, impaired respiratory function as evidenced by NPO or clear liquid status due to medical condition Nutrition Interventions:  
Food and/or Nutrient Delivery: Start parenteral nutrition Nutrition Education and Counseling: No recommendations at this time Coordination of Nutrition Care: Continue to monitor while inpatient, Interdisciplinary rounds Goals: Pt will tolerate initiation of nutrition support with BG <200mg/dL and lytes WNL in 2-4 days. Nutrition Monitoring and Evaluation:  
Behavioral-Environmental Outcomes: None identified Food/Nutrient Intake Outcomes: Parenteral nutrition intake/tolerance Physical Signs/Symptoms Outcomes: Biochemical data, Nutrition focused physical findings, Skin, Weight, GI status, Fluid status or edema, Hemodynamic status Discharge Planning: Too soon to determine Electronically signed by Barbi Taylor RD, [address]  on 4/21/2021 at 12:55 PM 
 
Contact: [id number]

## 2021-04-21 NOTE — PROGRESS NOTES
1930 Bedside and Verbal shift change report given to Isaiah Sherman (oncoming nurse) by Stephanie Arteaga (offgoing nurse). Report included the following information SBAR, Kardex, Procedure Summary, Intake/Output, MAR, Recent Results and Cardiac Rhythm NSR.  
2000 pt assessed per flowsheet, voices comfort currently, able to assist with repositioning in bed. 
2210 pt off Bipap x20 mins for 2 pop tay, cup of water and her evening meds. sats maintained 86% or greater until the last few minutes prior to replacing Bipap mask. Pt fareed well 
0000 pt reassessed per flowsheet, fareed cpap well at this time. 0530 pt has been restful, repositions in bed with minimal, if any assist required. Labs collected and sent. When placed on heated high flow, unable to fareed for more than a few minutes upon this occurrence before desatting to 74%. cpap replaced with sats recovering quickly to 96%. 0700 Bedside and Verbal shift change report given to Stephanie Arteaga (oncoming nurse) by Isaiah Sherman (offgoing nurse). Report included the following information SBAR, Kardex, Procedure Summary, Intake/Output, MAR, Recent Results and Cardiac Rhythm NSR/SB.

## 2021-04-22 NOTE — PROGRESS NOTES
Physician Progress Note Nathanael Wade 
[facility] #:                  [id number] :                       1960 ADMIT DATE:       2021 1:42 AM 
DISCH DATE: 
RESPONDING 
PROVIDER #:        GRISEL DAVILA DO 
 
 
 
QUERY TEXT: 
 
Stage of Chronic Kidney Disease: Please provide further specificity, if known. Clinical indicators include: chronic kidney disease, bun, creatinine, kd, ckd, bun/creatinine, gfr, bnp, pro-bnp, cr 
Options provided: 
-- Chronic kidney disease stage 1 
-- Chronic kidney disease stage 2 
-- Chronic kidney disease stage 3 
-- Chronic kidney disease stage 3a 
-- Chronic kidney disease stage 3b 
-- Chronic kidney disease stage 4 
-- Chronic kidney disease stage 5 
-- Chronic kidney disease stage 5, requiring dialysis -- End stage renal disease 
-- Other - I will add my own diagnosis -- Disagree - Not applicable / Not valid -- Disagree - Clinically Unable to determine / Unknown PROVIDER RESPONSE TEXT: 
 
The patient has chronic kidney disease stage 3. QUERY TEXT: 
 
Pt admitted with COVID-19 and noted to have T: 100.4; HR: 93 and RR: 24.  If possible, please document in progress notes and discharge summary if you are evaluating and/or treating: The medical record reflects the following: 
Risk Factors: Hx: Ovarian Ca S/p chemo/ CKD / Urine retention / GERD / Asthma / Hypothyroid / Sleep Apnea w/ CPAP / 
Clinical Indicators: tmax: 100.4; hr: ; rr: 24-35; . [place] .. 80%; 81% O2 Sats on RA. ... Na+: 135. [place] Jodeen Andrés K+: 4.0.. Jodeen Andrés Jodeen Andrés Gluc: 154. [place] Jodeen Andrés Bun/Cr: 34/1.71. Jodeen Andrés ^ 28/1.17.. ^ 29/0.97. Jodeen Andrés [place] Alb: 3.0. Jodeen Andrés Jodeen Andrés Jodeen Andrés LD: 634. Jodeen Andrés [place] Procal: 0.33. .. Jodeen Andrés [place] Tnl:<0.05. ... pBNP: 394. ... COVID-19 rapid test Detected. Caro Bowen Ferritin: 1,712 . Caro Bowen CRP: 5.89. .. Caro Bowen D-dimer: 10.08. Caro Bowen Fibrinogen: 591. ..UA: le: large; wbc: >100; bact: 2+. .. Caro Bowen UCx: >100,000 colonies/ml; Enterococcus Faecalis; Mixed Urogenital Chey Isolated. .. Caro Bowen CXR: b/l LL opacification . Caro Bowen Repeat CXR: slightly increased patchy b/l airspace dz., compatible w/ worsening COVID PNA. ... Treatment: IVF Bolus; IV ABx; IV Rocephin; IV Zithromax; COVID R/o; CXR; Labs; IV dexamethasone; Zinc/vitamin C; UA AND UCx; BCx; ABG; Change to Hi Flow O2; ICU Admit Thank you, Sweta Carr CDI 
 Options provided: 
-- Sepsis present on admission due to COVID-19 infection -- Sepsis not present on admission due to COVID-19 infection -- Sepsis present on admission due to COVID-19 pneumonia -- Sepsis not present on admission due to COVID-19 pneumonia 
-- Covid-19 infection without sepsis -- Covid-19 pneumonia without sepsis -- Other - I will add my own diagnosis -- Disagree - Not applicable / Not valid -- Disagree - Clinically unable to determine / Unknown 
-- Refer to Clinical Documentation Reviewer PROVIDER RESPONSE TEXT: 
 
This patient has sepsis which was present on admission due to COVID-19 infection.  
 
Query created by: Ever Torres on 4/21/2021 4:52 PM 
 
 
Electronically signed by:  Katherin Michel DO 4/22/2021 10:10 AM

## 2021-04-22 NOTE — PROGRESS NOTES
[facility] 
 
ICU TEAM Progress Note Name: Hermelinda Murry : 1960 MRN: [id number] Date: 2021 Assessment/Plan: 1. Acute hypoxic respiratory failure d/t COVID pna 
a. HiFlo/CPAP/BiPAP as needed. Alternate modalities as fareed.  
b. Remains CPAP dependent 
c. Bronchodilators 
d. SoluMed 60 q12h 
e. Rocephin/Azith completed 
f. Keep I/Os even to negative as able 2. FEILCIA/CKD 
a. Adequate UOP 
b. Monitor 
c. Hematuria noted - h/o ureteral stents d/t ovarian CA - monitor 3. DM - insulin 4. Ovarian CA, metastatic, Stage 3c  
a. Dx'd 2012 
b. Had been on palliative chemo 
c. See H&P for Onc hx 
5. DVT 
a. Lovenox d/t decreased po intake 
b. Return to home Eliquis when able 6. UTI, E faecalis, MDR 
a. Vanco Day 3/5 
b. Maintain mcclain 7. Pulm HTN, h/o 
8. Anemia, acute on chronic 
a. Transfuse prn 
9. SHABANA on CPAP at home 
a. As above 10. Hypothyroid 
a. Synthroid 11. Depression 
a. Zoloft 12. GOC - have asked Palliative care to see 
a. Given pt's underlying comorbidities, should she progress to the point of requiring intubation, her mortality would approach 100%. 13. COVID: Rapid +  
a. Steroids b. VitC/Zinc up to 10 days 
c. Not a remdesivir candidate d/t CPAP 
d. As above 14. SBP Goal of: > 90 mmHg 15. MAP Goal of: > 65 mmHg 16. IVFs: prn 
17. Transfusion Trigger (Hgb): <7 g/dL 18. Respiratory Goals: 
a. Chlorhexidine  
b. Aggressive bronchopulmonary hygiene 
c. Incentive spirometry 19. Pulmonary toilet: Duo-Nebs 20. SpO2 Goal: > 92% 21. Keep K>4; Mg>2  
22. PT/OT: PT consulted and on board, OT consulted and on board and Speech therapy consulted and on board 23. Discussed Plan of Care/Code Status: Full Code 24. Discussed Care Plan with Bedside RN 
25. Documentation of Current Medications 26. Further as below: 
 
F - Feeding:  Yes po as fareed A - Analgesia: None S - Sedation: N/A 
T - DVT Prophylaxis: Lovenox H - Head of Bed: > 30 Degrees U - Ulcer Prophylaxis: Not at this time G - Glycemic Control: Insulin S - Spontaneous Breathing Trial: N/A 
B - Bowel Regimen: None needed at this time I - Indwelling Catheter: 
 Tubes: None Lines: Peripheral IV and Port Drains: Chliders Catheter D - De-escalation of Antibiotics: as above Multidisciplinary Rounds Completed: Yes ABCDEF Bundle/Checklist Completed: 
Yes SPECIAL EQUIPMENT None DISPOSITION Stay in ICU Subjective:  
Progress Note: 4/22/2021 Reason for ICU Admission: acute hypoxemic respiratory failure d/t COVID  
 
HPI: Felecia Lopez is a 62 y.o. female who has a PMH of ovarian cancer s/p recent chemotherapy, CKD, urinary retention, GERD, asthma, chronic pain, anemia, depression and anxiety who presented to the ED via EMS with progressively worsening SOB x2 days. She denied fever, chills, N/V/D chest pain, or other constitutional symptoms. Oxygen sats were reported to be 50% on RA at home. She was placed on NRB by EMS and had oxygen sats in the 70s upon arrival to the ED. ABG on NRB was 7.49/29/58. She was transitioned to CPAP 14 100%.   
She was treated with empiric antibiotics. She was found to be COVID positive.  
  
ICU was consulted and she was accepted to the ICU service.  
  
Note: + contrast allergy Overnight Events: No major o/n events reported. Fareed CPAP, but fareed HiFlo less and less. Poor po intake. Adequate UOP. Sat up on side of bed yest, did well. POD: 
* No surgery found * S/P:  
 
 
Active Problem List:  
 
Problem List  Date Reviewed: 4/14/2021 Codes Class Carcinomatosis ([facility] ICD-10-CM: C80.0 ICD-9-CM: 199.0 Controlled type 2 diabetes mellitus without complication, with long-term current use of insulin (HCC) ICD-10-CM: E11.9, Z79.4 ICD-9-CM: 250.00, V58.67 Morbid obesity ([facility] ICD-10-CM: E66.01 
ICD-9-CM: 278.01   
   
 HADEQ-05 ICD-10-CM: U07.1 ICD-9-CM: 079.89 Fatigue ICD-10-CM: R53.83 ICD-9-CM: 780.79  SOB (shortness of breath) ICD-10-CM: R06.02 
ICD-9-CM: 786.05 Lymphedema of both lower extremities ICD-10-CM: I89.0 ICD-9-CM: 521.5 S/P ureteral stent placement ICD-10-CM: Z96.0 ICD-9-CM: V45.89 Anemia associated with chemotherapy ICD-10-CM: D64.81, T45.1X5A 
ICD-9-CM: 285.3, E933.1 Mass of chest wall ICD-10-CM: R22.2 ICD-9-CM: 786.6 Hypertension ICD-10-CM: I10 
ICD-9-CM: 401.9 Pain due to malignant neoplasm metastatic to bone (HCC) ICD-10-CM: G89.3, C79.51 
ICD-9-CM: 338.3 Overview Signed 7/18/2018  1:46 PM by MALLIKA Rivas Mediastinal chest wall mass Diabetes ([facility] ICD-10-CM: E11.9 ICD-9-CM: 250.00 Hypothyroidism ICD-10-CM: E03.9 ICD-9-CM: 244.9 Anemia ICD-10-CM: D64.9 ICD-9-CM: 285.9 Contrast media allergy ICD-10-CM: Z91.041 ICD-9-CM: V15.08 On antineoplastic chemotherapy ICD-10-CM: Z79.899 ICD-9-CM: V58.69 Reactive depression ICD-10-CM: F32.9 ICD-9-CM: 300.4 Type 2 diabetes mellitus with nephropathy (HCC) ICD-10-CM: E11.21 
ICD-9-CM: 250.40, 583.81 Lymphadenopathy, mediastinal ICD-10-CM: R59.0 ICD-9-CM: 785.6 Pulmonary hypertension, mild (HCC) ICD-10-CM: I27.20 ICD-9-CM: 416.8 Renal calculi ICD-10-CM: N20.0 ICD-9-CM: 592.0 Hyperglycemia due to type 2 diabetes mellitus ([facility] ICD-10-CM: E11.65 ICD-9-CM: 250.00 Ovarian cancer ([facility] ICD-10-CM: C56.9 ICD-9-CM: 183.0 Acute deep vein thrombosis (DVT) of proximal vein of right lower extremity (HCC) ICD-10-CM: I82.4Y1 ICD-9-CM: 453.41 Port-A-Cath in place ICD-10-CM: Z95.828 ICD-9-CM: V45.89 Dyslipidemia (high LDL; low HDL) ICD-10-CM: E78.5 ICD-9-CM: 272.4 SHABANA on CPAP ICD-10-CM: G47.33, Z99.89 ICD-9-CM: 327.23, V46.8 Abnormal mammogram ICD-10-CM: R92.8 ICD-9-CM: 793.80 Past Medical History:  
 
 has a past medical history of Adverse effect of anesthesia, Anemia, Arthritis, Asthma, BRCA negative (1/2013), Calculus of kidney, Chronic kidney disease, Chronic pain, CINV (chemotherapy-induced nausea and vomiting) (8/21/2014), Decreased hearing, right, Diabetes ([address] (Age 46), Environmental allergies, Fibromyalgia, GERD (gastroesophageal reflux disease), pulmonary embolus during pregnancy (1/18/2015), Hydronephrosis due to obstruction of ureter (3/18/2019), Hypertension, Ill-defined condition, Ill-defined condition (2016), Mass of chest wall (05/2018), Morbid obesity ([address] Murmur, heart, Other and unspecified hyperlipidemia, Ovarian cancer ([address] (9/2012, 1/2014), Psychiatric disorder, Rectal bleeding, Thromboembolus ([address] (1993), Thyroid disease, and Unspecified sleep apnea. Past Surgical History:  
 
 has a past surgical history that includes pr breast surgery procedure unlisted; hx gyn (1990 1993); hx hysterectomy (9/2012); hx vascular access (11/4/2015); hx vascular access (Left, 2017); hx urological (Right, 2017); hx orthopaedic (Right, 1990); and hx orthopaedic (Right, 1980'S). Home Medications:  
 
Prior to Admission medications Medication Sig Start Date End Date Taking? Authorizing Provider  
albuterol (PROVENTIL HFA, VENTOLIN HFA, PROAIR HFA) 90 mcg/actuation inhaler Take 2 Puffs by inhalation every four (4) hours as needed for Wheezing. Yes Provider, Historical  
furosemide (Lasix) 20 mg tablet Take 20 mg by mouth two (2) times a day. Yes Provider, Historical  
Eliquis 5 mg tablet TAKE 1 TAB BY MOUTH TWO (2) TIMES A DAY FOR 30 DAYS. 3/1/21  Yes Klaudia Madden PA-C  
omega-3 acid ethyl esters (LOVAZA) 1 gram capsule TAKE 1 CAPSULE BY MOUTH TWICE A DAY 10/6/20  Yes Adam Rainey MD  
rosuvastatin (CRESTOR) 10 mg tablet Take 10 mg by mouth nightly. Yes Provider, Historical  
doxazosin (CARDURA) 1 mg tablet Take 1 mg by mouth daily.    Yes Provider, Historical  
LEVEMIR FLEXTOUCH U-100 INSULN 100 unit/mL (3 mL) inpn INJECT 30 UNITS IN AM AND 50 UNITS AT NIGHT. INCREASE AS DIRECTED TO  UNITS/DAY. 8/15/19  Yes Prabhjot Day MD  
levothyroxine (SYNTHROID) 150 mcg tablet TAKE 1 TABLET BY MOUTH EVERY DAY BEFORE BREAKFAST 4/2/19  Yes Claudell Battles, MD  
albuterol (PROVENTIL VENTOLIN) 2.5 mg /3 mL (0.083 %) nebulizer solution 2.5 mg by Nebulization route every four (4) hours as needed for Wheezing. Yes Provider, Historical  
SYMBICORT 160-4.5 mcg/actuation HFAA Take 2 Puffs by inhalation two (2) times daily as needed. 12/26/17  Yes Nelly Villarreal MD  
gabapentin (NEURONTIN) 100 mg capsule Take 2 Caps by mouth nightly. Max Daily Amount: 200 mg. Indications: neuropathic pain 2/10/21   Dax Arana PA-C  
dexAMETHasone (DECADRON) 4 mg tablet TAKE 2 TABLETS WITH BREAKFAST THE DAY BEFORE CHEMO AND FOR 2 DAYS AFTER CHEMO 1/28/21   Angelic Almeida MD  
lidocaine-prilocaine (EMLA) topical cream Apply small amount over port area and cover with band aid one hour before chemo 6/30/20   Angelic Almeida MD  
ondansetron (ZOFRAN ODT) 4 mg disintegrating tablet Take 1 Tab by mouth every eight (8) hours as needed for Nausea. Indications: nausea and vomiting caused by cancer drugs 6/30/20   Angelic Almeida MD  
EPINEPHrine (EPIPEN) 0.3 mg/0.3 mL injection INJECT 0.3 ML BY INTRAMUSCULAR ROUTE ONCE AS NEEDED FOR UP TO 1 DOSE. 1/16/19   Provider, Historical  
azelastine (ASTELIN) 137 mcg (0.1 %) nasal spray 1 Man by Both Nostrils route daily as needed. Use in each nostril as directed     Provider, Historical  
ascorbic acid, vitamin C, (VITAMIN C) 1,000 mg tablet Take 1,000 mg by mouth daily. Provider, Historical  
cholecalciferol, VITAMIN D3, (VITAMIN D3) 5,000 unit tab tablet Take 5,000 Units by mouth daily. Provider, Historical  
 
 
Allergies/Social/Family History: Allergies Allergen Reactions  Latex Hives  Carboplatin Other (comments)   Patient developed shortness of breath, felt faint, coughing, skin flushed and \"itchy\". This occurred on the patients 8th treatment.  Iodinated Contrast Media Rash 13 hr pre-medication prior to IV contrast.  
Patient has done well with 1 hr pre-medication of (Solu-Medrol) 40mg &  (Benadryl) 50mg.  Lipitor [Atorvastatin] Myalgia  Shellfish Containing Products Hives  Tape [Adhesive] Itching and Other (comments) \"op site-- clear,thin tape\"--caused blister  Tomato Hives  Iodine And Iodide Containing Products Rash  Olmesartan Other (comments)  Losartan Other (comments)  
  headaches  Percocet [Oxycodone-Acetaminophen] Other (comments) Fever; however, current home med Social History Tobacco Use  Smoking status: Never Smoker  Smokeless tobacco: Never Used Substance Use Topics  Alcohol use: Yes Comment: 1 drink per month Family History Problem Relation Age of Onset  Hypertension Mother Gina Sit Arthritis-osteo Mother  Hypertension Father  Arthritis-osteo Father  Cancer Father PROSTATE  COPD Father  Hypertension Brother  Elevated Lipids Brother  Arthritis-osteo Brother  Hypertension Brother  Elevated Lipids Brother  Obesity Brother  Cancer Brother PROSTATE  Anesth Problems Son DELAYED AWAKENING  
 Diabetes Maternal Grandmother  Anesth Problems Paternal Grandmother PATIENT STATES GRANDMOTHER'S HEART STOPPED DURING SURGERY Review of Systems: A comprehensive review of systems was negative except for that written in the HPI. Objective:  
Vital Signs: 
Visit Vitals BP (!) 147/76 Pulse (!) 58 Temp 97.4 °F (36.3 °C) Resp 22 Ht 5' 9\" (1.753 m) Wt 119.3 kg (263 lb 0.1 oz) LMP 2011 SpO2 95% BMI 38.84 kg/m² O2 Flow Rate (L/min): 40 l/min O2 Device: CPAP mask Temp (24hrs), Av.6 °F (36.4 °C), Min:97.1 °F (36.2 °C), Max:97.9 °F (36.6 °C) Intake/Output:  
 
Intake/Output Summary (Last 24 hours) at 2021 0311 Last data filed at 4/22/2021 2408 Gross per 24 hour Intake 1175 ml Output 2040 ml Net -865 ml Physical Exam: 
 
General:  Alert, cooperative, well noursished, well developed, appears stated age Eyes:  Sclera anicteric. Pupils equally round and reactive to light. Mouth/Throat: Mucous membranes normal, oral pharynx clear Neck: Supple Lungs:   Diminished, course CV:  Regular rate and rhythm,no murmur, click, rub or gallop Abdomen:   Soft, non-tender. bowel sounds normal. non-distended Extremities: No cyanosis or edema Skin: Skin color, texture, turgor normal. no acute rash or lesions Lymph nodes: Cervical and supraclavicular normal  
Musculoskeletal: No swelling or deformity Lines/Devices:  Intact, no erythema, drainage or tenderness Neuro/Psych: Awake, alert and oriented, MOODY, nonfocal   
 
 
LABS AND  DATA: Personally reviewed Recent Labs  
  04/22/21 0429 04/21/21 
0449 WBC 14.7* 11.1* HGB 8.2* 8.5* HCT 26.1* 26.8*  
 190 Recent Labs  
  04/22/21 
0429 04/21/21 
0449  145  
K 4.4 4.7 * 114* CO2 28 26 BUN 25* 27* CREA 0.93 1.06* * 270* CA 8.6 8.5 MG 2.4 2.4 PHOS 2.3* 2.6 Recent Labs  
  04/22/21 
0429 04/21/21 
0449 AP 66 62  
TP 6.6 6.7 ALB 2.2* 2.3*  
GLOB 4.4* 4.4* No results for input(s): INR, PTP, APTT, INREXT, INREXT in the last 72 hours. No results for input(s): PHI, PCO2I, PO2I, FIO2I in the last 72 hours. No results for input(s): CPK, CKMB, TROIQ, BNPP in the last 72 hours. Hemodynamics:  
PAP:   CO:    
Wedge:   CI:    
CVP:    SVR:    
  PVR:    
 
Ventilator Settings: 
Mode Rate Tidal Volume Pressure FiO2 PEEP  
         100 % Peak airway pressure:     
Minute ventilation: 14 l/min MEDS: Reviewed Chest X-Ray: CXR Results  (Last 48 hours) None Images:  
Reviewed ECHO: 1/16/2020 Result status: Final result · Normal cavity size and systolic function (ejection fraction normal). Mild concentric hypertrophy. Estimated left ventricular ejection fraction is 55 - 60%. Visually measured ejection fraction. No regional wall motion abnormality noted. · Mildly dilated left atrium. · Mild aortic valve sclerosis. · Trace mitral valve regurgitation is present. CRITICAL CARE CONSULTANT NOTE I had a face to face encounter with the patient, reviewed and interpreted patient data including clinical events, labs, images, vital signs, I/O's, and examined patient. I have discussed the case and the plan and management of the patient's care with the consulting services, the bedside nurses and the respiratory therapist.   
 
NOTE OF PERSONAL INVOLVEMENT IN CARE This patient has a high probability of imminent, clinically significant deterioration, which requires the highest level of preparedness to intervene urgently. I participated in the decision-making and personally managed or directed the management of the following life and organ supporting interventions that required my frequent assessment to treat or prevent imminent deterioration. I personally spent 31 minutes of critical care time. This is time spent at this critically ill patient's bedside actively involved in patient care as well as the coordination of care and discussions with the patient's family. This does not include any procedural time which has been billed separately. DO Song [facility] 4/22/2021

## 2021-04-22 NOTE — PROGRESS NOTES
1915- Change of shift report received from [address] (offgoing nurse). Report included the following information SBAR, Kardex, MAR, Intake/Output, Recent Results & Cardiac Rhythm NSR.  
 
2000- Shift assessment complete; see flowsheets for details. Pt is alert & oriented x4, follows commands & moves all extremities spontaneously. Pt remains on CPAP mask with o2 sats 90-95%. Pt tolerates mask well; desats to 70s when mask is removed for meds/water, but recovers within 1-2 minutes. 0000- Reassessment complete; no new changes at this time. 0400- Reassessment complete. Blood drawn & sent to lab. End of Shift Note Bedside shift change report given to Kenia Roe RN (oncoming nurse) by Dari Corrigan (offgoing nurse). Report included the following information SBAR, Kardex, Intake/Output, MAR and Recent Results Shift worked:  7p-7a Shift summary and any significant changes: N/A Concerns for physician to address:  nutrition [facility]  [id number] Activity: 
Activity Level: Dangle Side of Bed Number times ambulated in hallways past shift: 0 Number of times OOB to chair past shift: 0 Cardiac:  
Cardiac Monitoring: Yes     
Cardiac Rhythm: Normal sinus rhythm Access:  
Current line(s): PIV Genitourinary:  
Urinary status: mcclain Respiratory:  
O2 Device: CPAP mask Chronic home O2 use?: NO Incentive spirometer at bedside: NO 
  
GI: 
Last Bowel Movement Date: 04/20/21 Current diet:  No diet orders on file Passing flatus: YES Tolerating current diet: NO 
  
 
Pain Management:  
Patient states pain is manageable on current regimen: N/A Skin: 
Delroy Score: 15 Interventions: speciality bed Patient Safety: 
Fall Score: Total Score: 3 Interventions: bed/chair alarm, gripper socks and pt to call before getting OOB High Fall Risk: Yes Length of Stay: 
Expected LOS: 5d 9h 
Actual LOS: 5 Dari Corrigan

## 2021-04-23 NOTE — PROGRESS NOTES
PCCM Brief Worsening hypoxia this am. Rough night as well. She was changed to BiPAP 25/10 @ 1.00. SpO2 continued to dip. Down to 71-73 on same settings. Changed to CPAP @ 18, 1.00 w/ SpO2 increased to 85-88. Check ABG in one hour. Modest to minimal response to lasix. Hold LR for now. Continue TPN. Pt remains Full Code and would be intubated. CCT = 22 min, additive Maite Flattery, DO Addend:  
 
Good effect w/ CPAP initially. SpO2 now again declining. May require intubation. S/o to night crew. Discussions w/ pt & family ongoing re: [address]   
 
Maite Flattery, DO

## 2021-04-23 NOTE — PROGRESS NOTES
Pharmacy Automatic Renal Dosing Protocol - Antimicrobials Indication for Antimicrobials: UTI Current Regimen of Each Antimicrobial: 
Vancomycin 2000 mg loading dose followed by 1000 mg q12h (Start Date ; Day #47) Previous Antimicrobial Therapy: 
Azithromycin 500 mg q24h (Start Date ; Day # 3) Ceftriaxone 2 g q24h (Start Date ; Day # 2) Goal Level: VANCOMYCIN TROUGH GOAL RANGE Vancomycin Trough: 10 - 15 mcg/mL  (AUC: 400 - 600 mg/hr/Liter/day) Date Dose & Interval Measured (mcg/mL) Extrapolated (mcg/mL)  
  1000 mg Q12H 14.3 16.6  
 1000 mg Q12H 17.3 16 Date & time of next level:  before 00:00 dose Significant Cultures:  
 Urine - Enterococcus faecalis >100,000 preliminary (staph epidermidis no longer listed) Labs: 
Recent Labs  
  21 
0358 21 
0429 21 
0449 CREA 0.93 0.93 1.06* BUN 27* 25* 27* WBC 17.3* 14.7* 11.1* Temp (24hrs), Av.1 °F (36.7 °C), Min:97.6 °F (36.4 °C), Max:98.7 °F (37.1 °C) Is the Patient on Dialysis? No 
 
Creatinine Clearance (mL/min):  
CrCl (Actual Body Weight): 117.4 CrCl (Adjusted Body Weight): 87.8 CrCl (Ideal Body Weight): 68.1 Impression/Plan:  
Vancomycin trough 16 mcg/ml on 1000 mg Q12H, continue same E. faecalis susceptible to vanc Level  with noon dose Antimicrobial stop date 7 days,  Pharmacy will follow daily and adjust medications as appropriate for renal function and/or serum levels.  
 
Thank you, 
Omar Arnold, [facility]

## 2021-04-23 NOTE — PROGRESS NOTES
1900- Change of shift report received from Cori Hansen RN (offgoing nurse). 2000- Assessment complete; see flowsheets for details. Pt remains on CPAP mask 12/100% with o2 sats 89-90%. Pt is alert & oriented x4, follows commands & moves all extremities spontaneously. 2305- Patient's RR 28-30, she appears to be working harder to breathe with o2 sats in the mid 80s. Repositioned patient, with head of bed elevated as much as she can tolerate. Sats still not coming up above 88%. Respiratory notified. 2320- Respiratory at bedside; pt transitioned from CPAP to Bi-Pap at this time. 0000- Patient's blood sugar >350. NP notified & sliding scale adjusted. 10 units of lispro given. 0115- Respiratory at bedside for ABG 
 
0605- Patient's blood sugar 380; NP notified 15 units of lispro given. 0700- Change of shift report given to Hillary Romo RN (oncoming nurse).

## 2021-04-23 NOTE — PROGRESS NOTES
[facility] 
 
ICU TEAM Progress Note Name: Oscar Davila : 1960 MRN: [id number] Date: 2021 Assessment/Plan: 1. Acute hypoxic respiratory failure d/t COVID pna 
a. Converted to BiPAP o/n and remains dependent 
b. Bronchodilators 
c. SoluMed 60 q12h 
d. Rocephin/Azith completed 
e. Keep I/Os even as able 2. FELICIA/CKD 
a. Adequate UOP 
b. Monitor 
c. Hematuria noted - h/o ureteral stents d/t ovarian CA - monitor 3. DM - insulin 4. Ovarian CA, metastatic, Stage 3c  
a. Dx'd 2012 
b. Had been on palliative chemo 
c. See H&P for Onc hx 
5. DVT 
a. Lovenox d/t decreased po intake 
b. Return to home Eliquis when able 6. UTI, E faecalis, MDR 
a. Vanco Day  
b. Maintain mcclain 7. Pulm HTN, h/o 
8. Anemia, acute on chronic 
a. Transfuse prn 
9. SHABANA on CPAP at home 
a. As above 10. Hypothyroid 
a. Synthroid 11. Depression 
a. Zoloft 12. GOC - have asked Palliative care to see 
a. Given pt's underlying comorbidities, should she progress to the point of requiring intubation, her mortality would approach 100%. b. She remains Full Code per her wishes 13. COVID: Rapid +  
a. Steroids b. VitC/Zinc up to 10 days 
c. Not a remdesivir candidate d/t CPAP 
d. As above 14. SBP Goal of: > 90 mmHg 15. MAP Goal of: > 65 mmHg 16. IVFs: prn 
17. Transfusion Trigger (Hgb): <7 g/dL 18. Respiratory Goals: 
a. Chlorhexidine  
b. Aggressive bronchopulmonary hygiene 
c. Incentive spirometry 19. Pulmonary toilet: Duo-Nebs 20. SpO2 Goal: > 92% 21. Keep K>4; Mg>2  
22. PT/OT: PT consulted and on board, OT consulted and on board and Speech therapy consulted and on board 23. Discussed Plan of Care/Code Status: Full Code 24. Discussed Care Plan with Bedside RN 
25. Documentation of Current Medications 26. Further as below: 
 
F - Feeding:  Yes po as fareed A - Analgesia: None S - Sedation: N/A 
T - DVT Prophylaxis: Lovenox H - Head of Bed: > 30 Degrees U - Ulcer Prophylaxis: Not at this time G - Glycemic Control: Insulin S - Spontaneous Breathing Trial: N/A 
B - Bowel Regimen: None needed at this time I - Indwelling Catheter: 
 Tubes: None Lines: Peripheral IV and Port Drains: Childers Catheter D - De-escalation of Antibiotics: as above Multidisciplinary Rounds Completed: Yes ABCDEF Bundle/Checklist Completed: 
Yes SPECIAL EQUIPMENT None DISPOSITION Stay in ICU Subjective:  
Progress Note: 4/23/2021 Reason for ICU Admission: acute hypoxemic respiratory failure d/t COVID  
 
HPI: Dia Bunn is a 62 y.o. female who has a PMH of ovarian cancer s/p recent chemotherapy, CKD, urinary retention, GERD, asthma, chronic pain, anemia, depression and anxiety who presented to the ED via EMS with progressively worsening SOB x2 days. She denied fever, chills, N/V/D chest pain, or other constitutional symptoms. Oxygen sats were reported to be 50% on RA at home. She was placed on NRB by EMS and had oxygen sats in the 70s upon arrival to the ED. ABG on NRB was 7.49/29/58. She was transitioned to CPAP 14 100%.   
She was treated with empiric antibiotics. She was found to be COVID positive.  
  
ICU was consulted and she was accepted to the ICU service.  
  
Note: + contrast allergy Overnight Events: Converted to BiPAP o/n from CPAP for worsening respiratory status. Remains alert, comfortable, but worsening hypoxia POD: 
* No surgery found * S/P:  
 
 
Active Problem List:  
 
Problem List  Date Reviewed: 4/14/2021 Codes Class Carcinomatosis ([facility] ICD-10-CM: C80.0 ICD-9-CM: 199.0 Controlled type 2 diabetes mellitus without complication, with long-term current use of insulin (HCC) ICD-10-CM: E11.9, Z79.4 ICD-9-CM: 250.00, V58.67 Morbid obesity ([facility] ICD-10-CM: E66.01 
ICD-9-CM: 278.01   
   
 [id number] ICD-10-CM: U07.1 ICD-9-CM: 079.89 Fatigue ICD-10-CM: R53.83 ICD-9-CM: 780.79  SOB (shortness of breath) ICD-10-CM: R06.02 
ICD-9-CM: 786.05 Lymphedema of both lower extremities ICD-10-CM: I89.0 ICD-9-CM: 236.8 S/P ureteral stent placement ICD-10-CM: Z96.0 ICD-9-CM: V45.89 Anemia associated with chemotherapy ICD-10-CM: D64.81, T45.1X5A 
ICD-9-CM: 285.3, E933.1 Mass of chest wall ICD-10-CM: R22.2 ICD-9-CM: 786.6 Hypertension ICD-10-CM: I10 
ICD-9-CM: 401.9 Pain due to malignant neoplasm metastatic to bone (HCC) ICD-10-CM: G89.3, C79.51 
ICD-9-CM: 338.3 Overview Signed 7/18/2018  1:46 PM by MALLIKA Shahid Mediastinal chest wall mass Diabetes ([facility] ICD-10-CM: E11.9 ICD-9-CM: 250.00 Hypothyroidism ICD-10-CM: E03.9 ICD-9-CM: 244.9 Anemia ICD-10-CM: D64.9 ICD-9-CM: 285.9 Contrast media allergy ICD-10-CM: Z91.041 ICD-9-CM: V15.08 On antineoplastic chemotherapy ICD-10-CM: Z79.899 ICD-9-CM: V58.69 Reactive depression ICD-10-CM: F32.9 ICD-9-CM: 300.4 Type 2 diabetes mellitus with nephropathy (HCC) ICD-10-CM: E11.21 
ICD-9-CM: 250.40, 583.81 Lymphadenopathy, mediastinal ICD-10-CM: R59.0 ICD-9-CM: 785.6 Pulmonary hypertension, mild (HCC) ICD-10-CM: I27.20 ICD-9-CM: 416.8 Renal calculi ICD-10-CM: N20.0 ICD-9-CM: 592.0 Hyperglycemia due to type 2 diabetes mellitus ([facility] ICD-10-CM: E11.65 ICD-9-CM: 250.00 Ovarian cancer ([facility] ICD-10-CM: C56.9 ICD-9-CM: 183.0 Acute deep vein thrombosis (DVT) of proximal vein of right lower extremity (HCC) ICD-10-CM: I82.4Y1 ICD-9-CM: 453.41 Port-A-Cath in place ICD-10-CM: Z95.828 ICD-9-CM: V45.89 Dyslipidemia (high LDL; low HDL) ICD-10-CM: E78.5 ICD-9-CM: 272.4 SHABANA on CPAP ICD-10-CM: G47.33, Z99.89 ICD-9-CM: 327.23, V46.8 Abnormal mammogram ICD-10-CM: R92.8 ICD-9-CM: 793.80 Past Medical History:  
 
 has a past medical history of Adverse effect of anesthesia, Anemia, Arthritis, Asthma, BRCA negative (1/2013), Calculus of kidney, Chronic kidney disease, Chronic pain, CINV (chemotherapy-induced nausea and vomiting) (8/21/2014), Decreased hearing, right, Diabetes ([address] (Age 46), Environmental allergies, Fibromyalgia, GERD (gastroesophageal reflux disease), pulmonary embolus during pregnancy (1/18/2015), Hydronephrosis due to obstruction of ureter (3/18/2019), Hypertension, Ill-defined condition, Ill-defined condition (2016), Mass of chest wall (05/2018), Morbid obesity ([address] Murmur, heart, Other and unspecified hyperlipidemia, Ovarian cancer ([address] (9/2012, 1/2014), Psychiatric disorder, Rectal bleeding, Thromboembolus ([facility] (1993), Thyroid disease, and Unspecified sleep apnea. Past Surgical History:  
 
 has a past surgical history that includes pr breast surgery procedure unlisted; hx gyn (1990 1993); hx hysterectomy (9/2012); hx vascular access (11/4/2015); hx vascular access (Left, 2017); hx urological (Right, 2017); hx orthopaedic (Right, 1990); and hx orthopaedic (Right, 1980'S). Home Medications:  
 
Prior to Admission medications Medication Sig Start Date End Date Taking? Authorizing Provider  
albuterol (PROVENTIL HFA, VENTOLIN HFA, PROAIR HFA) 90 mcg/actuation inhaler Take 2 Puffs by inhalation every four (4) hours as needed for Wheezing. Yes Provider, Historical  
furosemide (Lasix) 20 mg tablet Take 20 mg by mouth two (2) times a day. Yes Provider, Historical  
Eliquis 5 mg tablet TAKE 1 TAB BY MOUTH TWO (2) TIMES A DAY FOR 30 DAYS. 3/1/21  Yes Shahana Madden PA-C  
omega-3 acid ethyl esters (LOVAZA) 1 gram capsule TAKE 1 CAPSULE BY MOUTH TWICE A DAY 10/6/20  Yes Andria Jenkins MD  
rosuvastatin (CRESTOR) 10 mg tablet Take 10 mg by mouth nightly. Yes Provider, Historical  
doxazosin (CARDURA) 1 mg tablet Take 1 mg by mouth daily. Yes Provider, Historical  
LEVEMIR FLEXTOUCH U-100 INSULN 100 unit/mL (3 mL) inpn INJECT 30 UNITS IN AM AND 50 UNITS AT NIGHT.  INCREASE AS DIRECTED TO  UNITS/DAY. 8/15/19  Yes Adam Rainey MD  
levothyroxine (SYNTHROID) 150 mcg tablet TAKE 1 TABLET BY MOUTH EVERY DAY BEFORE BREAKFAST 4/2/19  Yes Ty Ventura MD  
albuterol (PROVENTIL VENTOLIN) 2.5 mg /3 mL (0.083 %) nebulizer solution 2.5 mg by Nebulization route every four (4) hours as needed for Wheezing. Yes Provider, Historical  
SYMBICORT 160-4.5 mcg/actuation HFAA Take 2 Puffs by inhalation two (2) times daily as needed. 12/26/17  Yes Nelly Dias MD  
gabapentin (NEURONTIN) 100 mg capsule Take 2 Caps by mouth nightly. Max Daily Amount: 200 mg. Indications: neuropathic pain 2/10/21   Dax Arana PA-C  
dexAMETHasone (DECADRON) 4 mg tablet TAKE 2 TABLETS WITH BREAKFAST THE DAY BEFORE CHEMO AND FOR 2 DAYS AFTER CHEMO 1/28/21   Esteban Lowery MD  
lidocaine-prilocaine (EMLA) topical cream Apply small amount over port area and cover with band aid one hour before chemo 6/30/20   Esteban Lowery MD  
ondansetron (ZOFRAN ODT) 4 mg disintegrating tablet Take 1 Tab by mouth every eight (8) hours as needed for Nausea. Indications: nausea and vomiting caused by cancer drugs 6/30/20   Esteban Lowery MD  
EPINEPHrine (EPIPEN) 0.3 mg/0.3 mL injection INJECT 0.3 ML BY INTRAMUSCULAR ROUTE ONCE AS NEEDED FOR UP TO 1 DOSE. 1/16/19   Provider, Historical  
azelastine (ASTELIN) 137 mcg (0.1 %) nasal spray 1 [place] by Both Nostrils route daily as needed. Use in each nostril as directed     Provider, Historical  
ascorbic acid, vitamin C, (VITAMIN C) 1,000 mg tablet Take 1,000 mg by mouth daily. Provider, Historical  
cholecalciferol, VITAMIN D3, (VITAMIN D3) 5,000 unit tab tablet Take 5,000 Units by mouth daily. Provider, Historical  
 
 
Allergies/Social/Family History: Allergies Allergen Reactions  Latex Hives  Carboplatin Other (comments) Patient developed shortness of breath, felt faint, coughing, skin flushed and \"itchy\".  This occurred on the patients 8th treatment.  Iodinated Contrast Media Rash 13 hr pre-medication prior to IV contrast.  
Patient has done well with 1 hr pre-medication of (Solu-Medrol) 40mg &  (Benadryl) 50mg.  Lipitor [Atorvastatin] Myalgia  Shellfish Containing Products Hives  Tape [Adhesive] Itching and Other (comments) \"op site-- clear,thin tape\"--caused blister  Tomato Hives  Iodine And Iodide Containing Products Rash  Olmesartan Other (comments)  Losartan Other (comments)  
  headaches  Percocet [Oxycodone-Acetaminophen] Other (comments) Fever; however, current home med Social History Tobacco Use  Smoking status: Never Smoker  Smokeless tobacco: Never Used Substance Use Topics  Alcohol use: Yes Comment: 1 drink per month Family History Problem Relation Age of Onset  Hypertension Mother [facility] Arthritis-osteo Mother  Hypertension Father  Arthritis-osteo Father  Cancer Father PROSTATE  COPD Father  Hypertension Brother  Elevated Lipids Brother  Arthritis-osteo Brother  Hypertension Brother  Elevated Lipids Brother  Obesity Brother  Cancer Brother PROSTATE  Anesth Problems Son DELAYED AWAKENING  
 Diabetes Maternal Grandmother  Anesth Problems Paternal Grandmother PATIENT STATES GRANDMOTHER'S HEART STOPPED DURING SURGERY Review of Systems: A comprehensive review of systems was negative except for that written in the HPI. Objective:  
Vital Signs: 
Visit Vitals BP (!) 157/70 Pulse 66 Temp 98.7 °F (37.1 °C) Resp 21 Ht 5' 9\" (1.753 m) Wt 114.2 kg (251 lb 12.3 oz) LMP 2011 SpO2 (!) 86% BMI 37.18 kg/m² O2 Flow Rate (L/min): 40 l/min O2 Device: BIPAP Temp (24hrs), Av.2 °F (36.8 °C), Min:97.6 °F (36.4 °C), Max:98.7 °F (37.1 °C) Intake/Output:  
 
Intake/Output Summary (Last 24 hours) at 2021 8522 Last data filed at 2021 6588 Gross per 24 hour Intake 344 ml Output 1960 ml Net -1616 ml Physical Exam: 
 
General:  Alert, cooperative, well noursished, well developed, appears stated age Eyes:  Sclera anicteric. Pupils equally round and reactive to light. Mouth/Throat: Mucous membranes normal, oral pharynx clear Neck: Supple Lungs:   Diminished, course CV:  Regular rate and rhythm,no murmur, click, rub or gallop Abdomen:   Soft, non-tender. bowel sounds normal. non-distended Extremities: No cyanosis or edema Skin: Skin color, texture, turgor normal. no acute rash or lesions Lymph nodes: Cervical and supraclavicular normal  
Musculoskeletal: No swelling or deformity Lines/Devices:  Intact, no erythema, drainage or tenderness Neuro/Psych: Awake, alert and oriented, MOODY, nonfocal   
 
 
LABS AND  DATA: Personally reviewed Recent Labs  
  04/23/21 0358 04/22/21 0429 WBC 17.3* 14.7* HGB 8.2* 8.2* HCT 26.2* 26.1*  
 176 Recent Labs  
  04/23/21 0358 04/22/21 
0429  144  
K 4.2 4.4  
* 112* CO2 26 28 BUN 27* 25* CREA 0.93 0.93 * 241* CA 8.5 8.6 MG 2.2 2.4 PHOS 2.1* 2.3* Recent Labs  
  04/23/21 0358 04/22/21 
0429 AP 75 66  
TP 6.8 6.6 ALB 2.0* 2.2*  
GLOB 4.8* 4.4* No results for input(s): INR, PTP, APTT, INREXT, INREXT in the last 72 hours. No results for input(s): PHI, PCO2I, PO2I, FIO2I in the last 72 hours. No results for input(s): CPK, CKMB, TROIQ, BNPP in the last 72 hours. Hemodynamics:  
PAP:   CO:    
Wedge:   CI:    
CVP:    SVR:    
  PVR:    
 
Ventilator Settings: 
Mode Rate Tidal Volume Pressure FiO2 PEEP  
         100 % Peak airway pressure:     
Minute ventilation: 18.4 l/min MEDS: Reviewed Chest X-Ray: CXR Results  (Last 48 hours) None Images:  
Reviewed ECHO: 1/16/2020 Result status: Final result · Normal cavity size and systolic function (ejection fraction normal).  Mild concentric hypertrophy. Estimated left ventricular ejection fraction is 55 - 60%. Visually measured ejection fraction. No regional wall motion abnormality noted. · Mildly dilated left atrium. · Mild aortic valve sclerosis. · Trace mitral valve regurgitation is present. CRITICAL CARE CONSULTANT NOTE I had a face to face encounter with the patient, reviewed and interpreted patient data including clinical events, labs, images, vital signs, I/O's, and examined patient. I have discussed the case and the plan and management of the patient's care with the consulting services, the bedside nurses and the respiratory therapist.   
 
NOTE OF PERSONAL INVOLVEMENT IN CARE This patient has a high probability of imminent, clinically significant deterioration, which requires the highest level of preparedness to intervene urgently. I participated in the decision-making and personally managed or directed the management of the following life and organ supporting interventions that required my frequent assessment to treat or prevent imminent deterioration. I personally spent 31 minutes of critical care time. This is time spent at this critically ill patient's bedside actively involved in patient care as well as the coordination of care and discussions with the patient's family. This does not include any procedural time which has been billed separately. DO Song Cuello Critical Care 4/23/2021

## 2021-04-23 NOTE — PROGRESS NOTES
Comprehensive Nutrition Assessment Type and Reason for Visit: Skylar Betancourt Nutrition Recommendations/Plan:  
Continue TPN at 42mL/h, add lipids Tomorrow: If BG <200mg/dL and lytes WNL, advance to 63mL/h 
Sunday: If BG <200mg/dL and lytes WNL, advance to Goal Rate of 83mL/h (provides 2078kcals/127gPro/398gDextrose) Nutrition Assessment:   Chart reviewed, case discussed during CCU rounds. Pt remains BIPAP dependent and unable to eat. TPN started last night. Phos dropped to 2.1, K and Mag WNL but had a small drop. BG in the 300's. Would keep TPN at 42mL/h for tonight and readdress advancement if BG and lytes look better tomorrow. Provided specific recommendations above. Pt is at high risk of needing intubation, if this ends up being the case and gastric access can be obtained could transition to enteral feeds and transition off of TPN. Estimated Daily Nutrient Needs: 
Energy (kcal): MSJ 2100 (1763 x 1.2); Weight Used for Energy Requirements: Current Protein (g): 99-132g (1.5-2gPro/kg IBW); Weight Used for Protein Requirements: Ideal 
Fluid (ml/day): 1800mL or per MD; Method Used for Fluid Requirements: Standard renal 
 
 
Nutrition Related Findings:  Meds: Vitamin C, humalog, zinc, NaPhos, solumedrol, insulin NPH, vancomycin; [email or web address]. Edema: +1 pitting-BLE. BM 4/20 Wounds:   
None Current Nutrition Therapies: 
Current Parenteral Nutrition Orders: · Type and Formula: D20, 6.4% AA · Lipids: 250ml, Three times weekly · Duration: Continuous · Rate/Volume: 42mL/h · Current PN Order Provides: 1157kcals/64gPro/202gDextrose · Goal PN Orders Provides: 2078kcals/127gPro/398gDextrose Anthropometric Measures: 
· Height:  5' 9\" (175.3 cm) · Current Body Wt:  114.2 kg (251 lb 12.3 oz) · Ideal Body Wt:  145 lbs:  170.7 % · BMI Category:  Obese class 2 (BMI 35.0-39. 9) Nutrition Diagnosis:  
· Inadequate protein-energy intake related to catabolic illness, impaired respiratory function as evidenced by NPO or clear liquid status due to medical condition Previous dx resolving. Nutrition Interventions:  
Food and/or Nutrient Delivery: Modify parenteral nutrition Nutrition Education and Counseling: No recommendations at this time Coordination of Nutrition Care: Continue to monitor while inpatient, Interdisciplinary rounds Goals: Pt will tolerate TPN advancement with BG <200mg/dL and lytes WNL in 2-4 days. Nutrition Monitoring and Evaluation:  
Behavioral-Environmental Outcomes: None identified Food/Nutrient Intake Outcomes: Parenteral nutrition intake/tolerance Physical Signs/Symptoms Outcomes: Biochemical data, Nutrition focused physical findings, Skin, Weight, GI status, Fluid status or edema, Hemodynamic status Discharge Planning: Too soon to determine Electronically signed by Clara Bates RD, [address]  on 4/23/2021 at 2:25 PM 
 
Contact: [phone]

## 2021-04-23 NOTE — PROGRESS NOTES
0700: Shift report received from Miriam Rabago RN.  
 
0800: Shift assessment completed. 0945: Patient rang out with call bell c/o feeling anxious, like she can't catch her breath. Sats were within range of where they had been, no obvious signs of distress. D/W Dr. Paris Willson; orders for xanax PRN and seroquel scheduled BID.  
 
1005: Patient c/o nausea; PRN zofran given. 1025: Patient remains nauseated following zofran. D/W Dr. Paris Willson, orders to give another 4mg zofran now. 1030: Zofran given. 1040: Compazine given. 1045: Dr. Paris Willson notified patient's sats maintaining 78-80%. Lasix orders received. 1100: Lasix given per order Vanc trough drawn and sent. 1200: Patient's sats maintaining 77-80% despite lasix administration. Dr. Paris Willson notified. 1215: Orders from Dr. Paris Willson to have patient switched to CPAP mode. RT notified. 1230: RT at bedside; patient switched to CPAP of 18. 
 
1400: Actemra dose started. 1600: Reassessment, no major changes. Patient oxygenating better overall; feels less work of breathing. 1830: Patient and I discussed next steps after she asked about \"the breathing tube\". I informed patient that we were doing everything we could to prevent needing to put her on ventilator, but that would be the next step. I also informed patient that, from my perspective, she is a very stoic individual and difficult to assess with respect to fatigue, anxiety, etc. She acknowledged this. I advised her that, for this reason, she needs to be very transparent with staff if she begins to feel too tired and fatigued and is ready to be intubated assuming she does not decompensate prior to that. I informed her that our fear in placing her on a ventilator is that, with extensive medical history, she may not come off. Patient stated \"I'm scare of it\".  I acknowledged her feelings and told her that, if at any time, she changes her mind and does not want to be intubated, she should let us know. She verbalized understanding. After this discussion, I asked if there was anyone I could call to talk to her and she requested I call her son from room. Called patient's son from her cell phone in room so she could hear his voice. I also provided him a lengthy update from patient's room. 1900: Shift report given to NIKIA Roberts.

## 2021-04-24 NOTE — PROGRESS NOTES
CC Brief: Pt continues to demonstrate severe hypoxia. ABG 7.307/47/46 on % FIo2 PEEP 15 R 24 . PEEP increased to 18 following last ABG - sats now in low to mid-80s. Propofol at 50mcg/kg/min, fentanyl at 200mcg/h. Pt RASS -3. Increasing fentanyl and starting versed drip in order to achieve RASS -5 so that patient may be paralyzed to efforts to achieve better oxygenation. Repeating versed/fentnayl bolus that was administered earlier. Also ordered 80mg IV lasix due to increased appearance of bilateral infiltrates on CXR.  
 
Rosalie Perez, NP

## 2021-04-24 NOTE — PROCEDURES
[facility] Procedure Note - Central Venous Access:  
Performed by Gregg Linares NP 
 
Obtained emergent Consent. I attempted to call the son prior to CVC to obtain consent, but did not get an answer on the phone Diagnosis: Shock Immediately prior to the procedure, the patient was reevaluated and found suitable for the planned procedure and any planned medications. Immediately prior to the procedure a time out was called to verify the correct patient, procedure, equipment, staff, and marking as appropriate. Central line Bundle: 
Full sterile barrier precautions used. 7-Step Sterility Protocol followed. (cap, mask sterile gown, sterile gloves, large sterile sheet, hand hygiene, 2% chlorhexidine for cutaneous antisepsis) 5 mL 1% Lidocaine placed at insertion site. Patient positioned in Trendelenburg?yes The site was prepped with ChloraPrep. Using Seldinger technique a Triple Lumen CVC was placed in the Right via direct cannulation with 2 number of attempts for IV Access. Ultrasound Guidance was utilized. There was good dark, non-pulsatile blood return in all ports. Femoral Site? no. If Yes, reason femoral site was chosen: na 
Catheter secured. Biopatch in place? yes. Sterile Bio-occlusive dressing placed. The following complications were encountered: None. A follow-up chest x-ray was ordered post procedure. The procedure was tolerated well. Gregg Linares NP Critical Care Medicine [facility]

## 2021-04-24 NOTE — PROGRESS NOTES
0700: Bedside shift change report received from [place], [place] (offgoing nurse). Report included the following information SBAR, Kardex, ED Summary, Procedure Summary, Intake/Output, MAR and Recent Results. 0800: Shift assessment completed, see flowsheets -- pt remains intubated, sedated and paralyzed. Dr. Bridget Almendarez aware pts TOF 0/4 on 10 mA, MD okay with maintaining current infusions. 1052: Radiology at bedside to verify OGT placement   
 
1200: Reassessment completed, see flowsheets -- NGT replaced, placement verified. 1600: Reassessment completed, see flowsheets -- CVC dressing replaced d/t excessive bloody drainiage. Will continue to monitor 1730: Dr. Jairo Amaya notified of significant bleeding from pts CVC site despite dressing change, verbal orders received for labwork. Blood collected and sent to lab 
 
1738: TF started at 10 mL/hr w/ Q4 100 mL water flush, will continue to monitor 
 
1900: Bedside shift change report given to NIKIA Roberts (oncoming nurse) by Jaylin Preciado RN (offgoing nurse). Report included the following information SBAR, Kardex, ED Summary, Procedure Summary, Intake/Output, MAR and Recent Results.

## 2021-04-24 NOTE — PROCEDURES
Emergent Intubation Performed by: oRb Marion MD 
Authorized by: Rob Marion MD  
 
Emergent Intubation: Location:  ICU Date/Time:  4/23/2021 9:40 PM 
Indications:  Respiratory failure Spontaneous Ventilation: present Level of Consciousness: awake Preoxygenated: Yes Airway Documentation: Airway:  ETT - Cuffed Technique:  Direct laryngoscopy Advanced Technique:  Glide scope Insertion Site:  Oral 
Blade Size:  4 ETT size (mm):  7.0 ETT Line Iker:  Lips ETT Insertion depth (cm):  22 Placement verified by: auscultation, EtCO2 and BBS Attempts:  1 Difficult airway: No   
RSI

## 2021-04-24 NOTE — PROGRESS NOTES
Famotidine HD Dosing Adjustment Indication:  SUP ppx Plan:  Famotidine 20mg Q12h/BID has been adjusted to Famotidine 20 mg IV/PO q24h for renal dosing Thank you, 
Erlin Vásquez, [facility]

## 2021-04-24 NOTE — PROGRESS NOTES
[facility] Brief Attempted to call pt's son, Ignacio Harris, via phone, [phone]. Went straight to [employer]. Left a voicemail to call back to offer update. Maite Adams DO Addend:  
 
Pt's son, called back. Updated status. Discussed current vent settings, worsening UOP. Discussed that she is too unstable for transfer to tertiary care center, and that [facility] is capable to offer her all known and available therapeutics and interventions for COVID. Discussed ongoing therapies. Anticipatory guidance provided. Questions sought and answered to his satisfaction. Given worsening UOP and renal fxn, may require Nephro eval. Discussed that overall she is a relatively poor RRT candidate, and that in COVID patients that go on to acute renal failure, that mortality approaches 100%. Will continue present management. Full Code Maite Adams DO

## 2021-04-24 NOTE — PROGRESS NOTES
Pharmacy  Enoxaparin (Lovenox®) Monitoring Indication: DVT Hx (Eliquis taken pta) Current Dose: Enoxaparin 120 mg subcutaneously every 12 hours Creatinine Clearance (mL/min): Estimated Creatinine Clearance: 36.6 mL/min (A) (based on SCr of 2.23 mg/dL (H)). Estimated Creatinine Clearance (using IBW):28.4 mL/min Current Weight: 114.2 Kg Labs: 
Recent Labs  
  04/24/21 
9319 04/24/21 
0152 04/23/21 
0358 04/22/21 
0429 CREA 2.23* 1.69* 0.93 0.93 HGB  --  8.8* 8.2* 8.2*  
PLT  --  252 181 176 Wt Readings from Last 1 Encounters:  
04/23/21 114.2 kg (251 lb 12.3 oz) Ht Readings from Last 1 Encounters:  
04/23/21 175.3 cm (69\") Impression/Plan:  
SCr increasing Pt currently qualifies for Enoxaparin 1mg/kg SQ q24h interval 
BMP and CBC already ordered daily

## 2021-04-24 NOTE — PROGRESS NOTES
[facility] 
 
ICU TEAM Progress Note Name: Sebas Marques : 1960 MRN: [id number] Date: 2021 Assessment/Plan: 1. Acute hypoxic respiratory failure d/t COVID pna 
a. Intubated o/n - PEEP 15, FiO2 1.00 
b. ETT Day 2 
c. Bronchodilators, nebs 
d. CPT, pulm toilet 
e. SoluMed 60 q12h 
f. Rocephin/Azith completed 
g. Keep I/Os even as able 
h. Started on [place] o/n - continue, low threshold to wean 
i. Wean FiO2 as able 
j. ARDSNet protocols 2. FELICIA/CKD3 
a. UOP down 
b. Cr up c/w diuresis attempts yest 
c. IVFs 
d. Hematuria noted - ureteral stents in place d/t ovarian CA - monitor 3. Pneumomediastinum/PTX d/t COVID 
a. Small. b. SC air on CXR  - present prior to CVC insertion 
c. Maintain vent support 
d. Monitor w/ daily CXR 
e. No targets for CTube 
f. No intervention unless PTX progresses to > 50% or goes to tension 4. Septic shock d/t COVID 
a. Levo - wean as able 5. DM - insulin 6. Ovarian CA, metastatic, Stage 3c  
a. Dx'd 2012 
b. Had been on palliative chemo 
c. See H&P for Onc hx 
7. DVT 
a. Therapeutic Lovenox 8. UTI, E faecalis, MDR 
a. Vanco Day  - complete course - dosing by Level  
b. Maintain mcclain 
c. ProCal remains low 9. Pulm HTN, h/o - as above 10. Anemia, acute on chronic 
a. Transfuse prn 11. SHABANA on CPAP at home 
a. Now intubated 12. Hypothyroid 
a. Synthroid 13. Depression 
a. Zoloft 14. GOC - have asked Palliative care to see 
a. Given pt's underlying comorbidities, now intubation, her mortality approaches 100%. b. Poor to grim prognosis 
c. She remains Full Code per her wishes 15. COVID: Rapid +  
a. Steroids, VitC/Zinc, Actemra completed 
b. Not a remdesivir candidate d/t CPAP & progression of course 
c. Not an ECMO candidate d/t ongoing malignancy  
d. Pt has received all known and available therapies for COVID  
e. Continue supportive care 16. SBP Goal of: > 90 mmHg 17. MAP Goal of: > 65 mmHg 18. IVFs: prn 19. Transfusion Trigger (Hgb): <7 g/dL 20. Respiratory Goals: 
a. Chlorhexidine  
b. Optimize PEEP/Ventilation/Oxygenation 
c. Goal Tidal Volume 6 cc/kg based on IBW 
d. Aim for lung protective ventilation 
e. Head of bed > 30 degrees 
f. Aggressive bronchopulmonary hygiene 
g. Incentive spirometry 21. Pulmonary toilet: Duo-Nebs 22. SpO2 Goal: > 92% 23. Keep K>4; Mg>2  
24. PT/OT: PT consulted and on board, OT consulted and on board and Speech therapy consulted and on board 25. Discussed Plan of Care/Code Status: Full Code 26. Discussed Care Plan with Bedside RN 
27. Documentation of Current Medications 28. Further as below: 
 
F - Feeding:  Yes TPN - start TFs today, complete TPN tonight A - Analgesia: Fentanyl S - Sedation: Propofol, Versed and Fentanyl T - DVT Prophylaxis: Lovenox H - Head of Bed: > 30 Degrees U - Ulcer Prophylaxis: Pepcid (famotidine) G - Glycemic Control: Insulin S - Spontaneous Breathing Trial: No 
B - Bowel Regimen: MiraLax I - Indwelling Catheter: 
 Tubes: None Lines: Peripheral IV and Port Drains: Childers Catheter D - De-escalation of Antibiotics: as above Multidisciplinary Rounds Completed: Yes ABCDEF Bundle/Checklist Completed: 
Yes SPECIAL EQUIPMENT None DISPOSITION Stay in ICU Subjective:  
Progress Note: 4/24/2021 Reason for ICU Admission: acute hypoxemic respiratory failure d/t COVID  
 
HPI: Connie Bustamante is a 62 y.o. female who has a PMH of ovarian cancer s/p recent chemotherapy, CKD, urinary retention, GERD, asthma, chronic pain, anemia, depression and anxiety who presented to the ED via EMS with progressively worsening SOB x2 days. She denied fever, chills, N/V/D chest pain, or other constitutional symptoms. Oxygen sats were reported to be 50% on RA at home. She was placed on NRB by EMS and had oxygen sats in the 70s upon arrival to the ED. ABG on NRB was 7.49/29/58. She was transitioned to CPAP 14 100%.  
  
She was treated with empiric antibiotics. She was found to be COVID positive.  
  
ICU was consulted and she was accepted to the ICU service.  
  
Note: + contrast allergy Overnight Events: Events noted. See extensive o/n documentation. Now intubated. Levo started as well. UOP dropped significantly, bordering on anuric. POD: 
* No surgery found * S/P:  
 
 
Active Problem List:  
 
Problem List  Date Reviewed: 4/14/2021 Codes Class Carcinomatosis ([facility] ICD-10-CM: C80.0 ICD-9-CM: 199.0 Controlled type 2 diabetes mellitus without complication, with long-term current use of insulin (HCC) ICD-10-CM: E11.9, Z79.4 ICD-9-CM: 250.00, V58.67 Morbid obesity ([facility] ICD-10-CM: E66.01 
ICD-9-CM: 278.01   
   
 XZBBL-05 ICD-10-CM: U07.1 ICD-9-CM: 079.89 Fatigue ICD-10-CM: R53.83 ICD-9-CM: 780.79 SOB (shortness of breath) ICD-10-CM: R06.02 
ICD-9-CM: 786.05 Lymphedema of both lower extremities ICD-10-CM: I89.0 ICD-9-CM: 746.4 S/P ureteral stent placement ICD-10-CM: Z96.0 ICD-9-CM: V45.89 Anemia associated with chemotherapy ICD-10-CM: D64.81, T45.1X5A 
ICD-9-CM: 285.3, E933.1 Mass of chest wall ICD-10-CM: R22.2 ICD-9-CM: 786.6 Hypertension ICD-10-CM: I10 
ICD-9-CM: 401.9 Pain due to malignant neoplasm metastatic to bone (HCC) ICD-10-CM: G89.3, C79.51 
ICD-9-CM: 338.3 Overview Signed 7/18/2018  1:46 PM by MALLIKA Salas Mediastinal chest wall mass Diabetes ([facility] ICD-10-CM: E11.9 ICD-9-CM: 250.00 Hypothyroidism ICD-10-CM: E03.9 ICD-9-CM: 244.9 Anemia ICD-10-CM: D64.9 ICD-9-CM: 285.9 Contrast media allergy ICD-10-CM: Z91.041 ICD-9-CM: V15.08 On antineoplastic chemotherapy ICD-10-CM: Z79.899 ICD-9-CM: V58.69 Reactive depression ICD-10-CM: F32.9 ICD-9-CM: 300.4 Type 2 diabetes mellitus with nephropathy (HCC) ICD-10-CM: E11.21 
ICD-9-CM: 250.40, 583.81  Lymphadenopathy, mediastinal ICD-10-CM: R59.0 ICD-9-CM: 785.6 Pulmonary hypertension, mild (HCC) ICD-10-CM: I27.20 ICD-9-CM: 416.8 Renal calculi ICD-10-CM: N20.0 ICD-9-CM: 592.0 Hyperglycemia due to type 2 diabetes mellitus ([facility] ICD-10-CM: E11.65 ICD-9-CM: 250.00 Ovarian cancer ([facility] ICD-10-CM: C56.9 ICD-9-CM: 183.0 Acute deep vein thrombosis (DVT) of proximal vein of right lower extremity (HCC) ICD-10-CM: I82.4Y1 ICD-9-CM: 453.41 Port-A-Cath in place ICD-10-CM: Z95.828 ICD-9-CM: V45.89 Dyslipidemia (high LDL; low HDL) ICD-10-CM: E78.5 ICD-9-CM: 272.4 SHABANA on CPAP ICD-10-CM: G47.33, Z99.89 ICD-9-CM: 327.23, V46.8 Abnormal mammogram ICD-10-CM: R92.8 ICD-9-CM: 793.80 Past Medical History:  
 
 has a past medical history of Adverse effect of anesthesia, Anemia, Arthritis, Asthma, BRCA negative (1/2013), Calculus of kidney, Chronic kidney disease, Chronic pain, CINV (chemotherapy-induced nausea and vomiting) (8/21/2014), Decreased hearing, right, Diabetes ([facility] (Age 46), Environmental allergies, Fibromyalgia, GERD (gastroesophageal reflux disease), pulmonary embolus during pregnancy (1/18/2015), Hydronephrosis due to obstruction of ureter (3/18/2019), Hypertension, Ill-defined condition, Ill-defined condition (2016), Mass of chest wall (05/2018), Morbid obesity ([facility] Murmur, heart, Other and unspecified hyperlipidemia, Ovarian cancer ([facility] (9/2012, 1/2014), Psychiatric disorder, Rectal bleeding, Thromboembolus ([facility] (1993), Thyroid disease, and Unspecified sleep apnea. Past Surgical History:  
 
 has a past surgical history that includes pr breast surgery procedure unlisted; hx gyn (1990 1993); hx hysterectomy (9/2012); hx vascular access (11/4/2015); hx vascular access (Left, 2017); hx urological (Right, 2017); hx orthopaedic (Right, 1990); and hx orthopaedic (Right, 1980'S).  
 
Home Medications:  
 
Prior to Admission medications Medication Sig Start Date End Date Taking? Authorizing Provider  
albuterol (PROVENTIL HFA, VENTOLIN HFA, PROAIR HFA) 90 mcg/actuation inhaler Take 2 Puffs by inhalation every four (4) hours as needed for Wheezing. Yes Provider, Historical  
furosemide (Lasix) 20 mg tablet Take 20 mg by mouth two (2) times a day. Yes Provider, Historical  
Eliquis 5 mg tablet TAKE 1 TAB BY MOUTH TWO (2) TIMES A DAY FOR 30 DAYS. 3/1/21  Yes Bernard Madden PA-C  
omega-3 acid ethyl esters (LOVAZA) 1 gram capsule TAKE 1 CAPSULE BY MOUTH TWICE A DAY 10/6/20  Yes Gerardo Ny MD  
rosuvastatin (CRESTOR) 10 mg tablet Take 10 mg by mouth nightly. Yes Provider, Historical  
doxazosin (CARDURA) 1 mg tablet Take 1 mg by mouth daily. Yes Provider, Historical  
LEVEMIR FLEXTOUCH U-100 INSULN 100 unit/mL (3 mL) inpn INJECT 30 UNITS IN AM AND 50 UNITS AT NIGHT. INCREASE AS DIRECTED TO  UNITS/DAY. 8/15/19  Yes Gerardo Ny MD  
levothyroxine (SYNTHROID) 150 mcg tablet TAKE 1 TABLET BY MOUTH EVERY DAY BEFORE BREAKFAST 4/2/19  Yes Fabiola Castanon MD  
albuterol (PROVENTIL VENTOLIN) 2.5 mg /3 mL (0.083 %) nebulizer solution 2.5 mg by Nebulization route every four (4) hours as needed for Wheezing. Yes Provider, Historical  
SYMBICORT 160-4.5 mcg/actuation HFAA Take 2 Puffs by inhalation two (2) times daily as needed. 12/26/17  Yes Nelly Cooley MD  
gabapentin (NEURONTIN) 100 mg capsule Take 2 Caps by mouth nightly. Max Daily Amount: 200 mg. Indications: neuropathic pain 2/10/21   Dax Arana PA-C  
dexAMETHasone (DECADRON) 4 mg tablet TAKE 2 TABLETS WITH BREAKFAST THE DAY BEFORE CHEMO AND FOR 2 DAYS AFTER CHEMO 1/28/21   Dennie Bellini, MD  
lidocaine-prilocaine (EMLA) topical cream Apply small amount over port area and cover with band aid one hour before chemo 6/30/20   Dennie Bellini, MD  
ondansetron (ZOFRAN ODT) 4 mg disintegrating tablet Take 1 Tab by mouth every eight (8) hours as needed for Nausea. Indications: nausea and vomiting caused by cancer drugs 6/30/20   Dia Newman MD  
EPINEPHrine (EPIPEN) 0.3 mg/0.3 mL injection INJECT 0.3 ML BY INTRAMUSCULAR ROUTE ONCE AS NEEDED FOR UP TO 1 DOSE. 1/16/19   Provider, Historical  
azelastine (ASTELIN) 137 mcg (0.1 %) nasal spray 1 Hope by Both Nostrils route daily as needed. Use in each nostril as directed     Provider, Historical  
ascorbic acid, vitamin C, (VITAMIN C) 1,000 mg tablet Take 1,000 mg by mouth daily. Provider, Historical  
cholecalciferol, VITAMIN D3, (VITAMIN D3) 5,000 unit tab tablet Take 5,000 Units by mouth daily. Provider, Historical  
 
 
Allergies/Social/Family History: Allergies Allergen Reactions  Latex Hives  Carboplatin Other (comments) Patient developed shortness of breath, felt faint, coughing, skin flushed and \"itchy\". This occurred on the patients 8th treatment.  Iodinated Contrast Media Rash 13 hr pre-medication prior to IV contrast.  
Patient has done well with 1 hr pre-medication of (Solu-Medrol) 40mg &  (Benadryl) 50mg.  Lipitor [Atorvastatin] Myalgia  Shellfish Containing Products Hives  Tape [Adhesive] Itching and Other (comments) \"op site-- clear,thin tape\"--caused blister  Tomato Hives  Iodine And Iodide Containing Products Rash  Olmesartan Other (comments)  Losartan Other (comments)  
  headaches  Percocet [Oxycodone-Acetaminophen] Other (comments) Fever; however, current home med Social History Tobacco Use  Smoking status: Never Smoker  Smokeless tobacco: Never Used Substance Use Topics  Alcohol use: Yes Comment: 1 drink per month Family History Problem Relation Age of Onset  Hypertension Mother Kristie Brown Arthritis-osteo Mother  Hypertension Father  Arthritis-osteo Father  Cancer Father PROSTATE  COPD Father  Hypertension Brother  Elevated Lipids Brother  Arthritis-osteo Brother  Hypertension Brother  Elevated Lipids Brother  Obesity Brother  Cancer Brother PROSTATE  Anesth Problems Son DELAYED AWAKENING  
 Diabetes Maternal Grandmother  Anesth Problems Paternal Grandmother PATIENT STATES GRANDMOTHER'S HEART STOPPED DURING SURGERY Review of Systems: A comprehensive review of systems was negative except for that written in the HPI. Objective:  
Vital Signs: 
Visit Vitals BP (!) 123/54 Pulse 82 Temp 97.9 °F (36.6 °C) Resp 24 Ht 5' 9\" (1.753 m) Wt 114.2 kg (251 lb 12.3 oz) LMP 2011 SpO2 93% BMI 37.18 kg/m² O2 Flow Rate (L/min): 40 l/min O2 Device: Ventilator Temp (24hrs), Av °F (36.7 °C), Min:97.8 °F (36.6 °C), Max:98.4 °F (36.9 °C) Intake/Output:  
 
Intake/Output Summary (Last 24 hours) at 2021 9435 Last data filed at 2021 8440 Gross per 24 hour Intake 2417.77 ml Output 1060 ml Net 1357.77 ml Physical Exam: 
 
General:  Critically ill, well noursished, well developed, appears stated age Eyes:  Sclera anicteric. Pupils equally round and reactive to light. Mouth/Throat: ETT, OGT Neck: Supple Lungs:   Diminished, course CV:  Regular rate and rhythm,no murmur, click, rub or gallop Abdomen:   Soft, non-tender. bowel sounds normal. non-distended Extremities: No cyanosis or edema Skin: Skin color, texture, turgor normal. no acute rash or lesions Lymph nodes: Cervical and supraclavicular normal  
Musculoskeletal: No swelling or deformity Lines/Devices:  Intact, no erythema, drainage or tenderness Neuro/Psych: Sedated, paralyzed LABS AND  DATA: Personally reviewed Recent Labs  
  21 
0152 21 
0358 WBC 19.5* 17.3* HGB 8.8* 8.2* HCT 28.4* 26.2*  
 181 Recent Labs  
  21 
1614 21 
0152 21 
0358  141 141  
K 4.5 4.8 4.2 * 109* 110* CO2 26 26 26 BUN 50* 46* 27* CREA 2.23* 1.69* 0.93 GLU 350* 496* 326* CA 8.5 8.5 8.5 MG 2.6* 2.5* 2.2 PHOS  --  6.2* 2.1* Recent Labs  
  04/24/21 
0152 04/23/21 
0358 AP 87 75  
TP 7.1 6.8 ALB 2.0* 2.0*  
GLOB 5.1* 4.8* No results for input(s): INR, PTP, APTT, INREXT, INREXT in the last 72 hours. No results for input(s): PHI, PCO2I, PO2I, FIO2I in the last 72 hours. No results for input(s): CPK, CKMB, TROIQ, BNPP in the last 72 hours. Hemodynamics:  
PAP:   CO:    
Wedge:   CI:    
CVP:    SVR:    
  PVR:    
 
Ventilator Settings: 
Mode Rate Tidal Volume Pressure FiO2 PEEP PRVC   400 ml    100 % 18 cm H20 Peak airway pressure: 40 cm H2O Minute ventilation: 9.7 l/min MEDS: Reviewed Chest X-Ray: CXR Results  (Last 48 hours) 04/24/21 0126  XR CHEST PORT Final result Impression:  Triple lumen catheter is in appropriate position for use with no associated  
pneumothorax. ET tube is in appropriate position as well. Hazy interstitial and parenchymal opacities are stable. Nonspecific interval increased subcutaneous emphysema in the right cervical  
region. Clinical correlation. Narrative:  Clinical history: New central line INDICATION:   New central line COMPARISON: 4/23/2021 FINDINGS:  
AP portable upright view of the chest demonstrates a stable enlarged  
cardiopericardial silhouette. Hazy interstitial and parenchymal opacities are  
stable. Port catheter on the left. ET tube at the inferior margin of the  
clavicle unchanged. Triple-lumen catheter on the right. No pneumothorax. Catheter tip is in appropriate position for use. .Subcutaneous emphysema on the  
right. .There is no pneumothorax. . Patient is on a cardiac monitor. 04/23/21 2220  XR CHEST PORT Final result Impression:  ET tube is in appropriate position. Interval increased interstitial and parenchymal opacities bilaterally.    
   
   
  
 Narrative:  Clinical history: s/p intubation INDICATION:   s/p intubation COMPARISON: 4/23/2021 FINDINGS:  
AP portable upright view of the chest demonstrates a stable enlarged  
cardiopericardial silhouette. Extensive hazy interstitial and parenchymal  
opacities are increased. .ET tube lies at the level of the [place]. Port catheter  
on the left. .There is no pneumothorax. . Patient is on a cardiac monitor. 04/23/21 0842  XR CHEST PORT Final result Impression:  Increased bilateral interstitial and alveolar opacities, compatible  
with COVID pneumonia. Narrative:  INDICATION: COVID. Hypoxia. COMPARISON: 4/19/2021 FINDINGS: AP portable imaging of the chest performed at 8:25 AM demonstrates a  
stable cardiomediastinal silhouette left subclavian Port-A-Cath is unchanged in  
position. Bilateral interstitial and alveolar opacities are increased. There is  
no pleural effusion or pneumothorax. Images:  
Reviewed ECHO: 1/16/2020 Result status: Final result · Normal cavity size and systolic function (ejection fraction normal). Mild concentric hypertrophy. Estimated left ventricular ejection fraction is 55 - 60%. Visually measured ejection fraction. No regional wall motion abnormality noted. · Mildly dilated left atrium. · Mild aortic valve sclerosis. · Trace mitral valve regurgitation is present. CRITICAL CARE CONSULTANT NOTE I had a face to face encounter with the patient, reviewed and interpreted patient data including clinical events, labs, images, vital signs, I/O's, and examined patient. I have discussed the case and the plan and management of the patient's care with the consulting services, the bedside nurses and the respiratory therapist.   
 
NOTE OF PERSONAL INVOLVEMENT IN CARE This patient has a high probability of imminent, clinically significant deterioration, which requires the highest level of preparedness to intervene urgently.  I participated in the decision-making and personally managed or directed the management of the following life and organ supporting interventions that required my frequent assessment to treat or prevent imminent deterioration. I personally spent 62 minutes of critical care time. This is time spent at this critically ill patient's bedside actively involved in patient care as well as the coordination of care and discussions with the patient's family. This does not include any procedural time which has been billed separately. Mark Moritz, DO [facility] 4/24/2021

## 2021-04-24 NOTE — PROGRESS NOTES
1900: Bedside and Verbal shift change report given to Humble Castanon RN 
 
2000: Shift assessment completed. O2 Sat was 70-80s% on CIPAP 
  
2130: After patient did face time with son, she made a decision to be intubated. JAD Rosales notified. 2145: intubated by Dr. Ricardo Macedo. RN administered 10 ml of propofol bolus and 10 ml of Succinylcholine bolus per anesthesiologist order. 2200: Chest X ray: right upper chest shows subcutaneous emphysema reported by radiologist. JAD Rosales aware. 0000: Reassessment complete. 0115: right IJ Quad line in placed by JAD Silverman. Chest X ray still shows - Subq. Emphysema. Glucose has been greater than 400. Insulin drips initiated. 0400: Reassessment complete. Subcutaneous emphysema on right chest is getting worse. CT order received. 0530: patient came back from CT without incidents with RT, two RNs, and Nursing tach. Chlorhexidine bathe completed. 0730: Bedside and Verbal shift change report given to Eunice Quintanilla RN (oncoming nurse) by Leif Herrera RN (offgoing nurse).

## 2021-04-25 NOTE — PROGRESS NOTES
1900: Bedside and Verbal shift change report received from [place] [id number]: Shift assessment completed. - Nimbex at 2mcg/kg/min - still TOF is 0/4 on 10 mA. - Patient is clammy and cool. Temperature was not able to evaluate. Bear hugger for warm blanket applied. - sinus bradycardia heart rhythm noted 40s-50s - NP Herrera aware. 2200: GI residual from NG tube noted 180 ml. Tube feeding rate increased to 20 ml/hr. 
 
 - Significant bleeding from pt's CVC site noted again- Quick clot dressing and new dressing applied. NP Herrera aware. Will continue to assess. 0000: Reassessment complete, no changes to previous. 0100: CVC site dressing changed again due to bleeding. 0300: Chlorhexidine bathe completed. CVC site dressing changed again due to bleeding. 0400: Reassessment complete, no changes. Temperature is getting better. Duncan Breeze is off at this time. Normal sinus rhythm noted. 0600: CVC site dressing changed again due to bleeding. 0700: Bedside and Verbal shift change report given to Teena Spence RN (oncoming nurse) by Kasye Osullivan RN (offgoing nurse).

## 2021-04-25 NOTE — PROGRESS NOTES
Pharmacy  Enoxaparin (Lovenox®) Monitoring Indication: DVT Hx (Eliquis PTA) Current Dose: Enoxaparin 120 mg subcutaneously every 24 hours Creatinine Clearance (mL/min): Estimated Creatinine Clearance: 28 mL/min (A) (based on SCr of 2.97 mg/dL (H)). Estimated Creatinine Clearance (using IBW):21.3 mL/min Current Weight: 114.2 Kg Labs: 
Recent Labs  
  04/25/21 
0231 04/24/21 
1736 04/24/21 
0135 04/24/21 
0152 04/23/21 
4894 CREA 2.97* 2.58* 2.23* 1.69* 0.93 HGB 7.0* 7.6*  --  8.8* 8.2*  
  --   --  252 181 Wt Readings from Last 1 Encounters:  
04/24/21 117.9 kg (259 lb 14.8 oz) Ht Readings from Last 1 Encounters:  
04/23/21 175.3 cm (69\") Impression/Plan:  
SCr increasing On 4/24 Enoxaparin 1mg/kg was changed to q24h interval for increased SCr / eCrCl If SCr continues to increase (SCr =/> 3.1), the estimated CrCl would be less than 20mL/min at which time transition to systemic heparin infusion protocol (no loading dose) would be recommended BMP and CBC already ordered daily Thank you, 
Josh Dugan, [facility]

## 2021-04-25 NOTE — PROGRESS NOTES
0700: recvd report and assumed care of pt 
 
0800: assessment as noted. Pt intubated, sedated, and paralyzed. On levo gtt. Vitals currently stable. 1200: no changes to report. Reassessment as noted. No urine output. Dr Chase Melgar and Nephrology aware. No dialysis candidate.

## 2021-04-25 NOTE — PROGRESS NOTES
[facility] 
 
ICU TEAM Progress Note Name: Tereza Alvarez : 1960 MRN: [id number] Date: 2021 Assessment/Plan: 1. Acute hypoxic respiratory failure d/t COVID pna 
a. Intubated o/n - PEEP 15, FiO2 0.70 
b. ETT Day 3 
c. Bronchodilators, nebs 
d. CPT, pulm toilet 
e. SoluMed 60 q12h 
f. Rocephin/Azith completed 
g. Keep I/Os even as able 
h. Remains on Valetri - continue for now, low threshold to wean 
i. Wean FiO2 as able 
j. ARDSNet protocols 
k. Paralyzed; prone prn 2. FELICIA/CKD3 
a. UOP down - oliguric bordering on anuric  
b. Cr continues to rise, becoming uremic  
c. IVFs 
d. Hematuria noted - ureteral stents in place d/t ovarian CA - monitor 
e. Nephro c/s 
f. No urgent need for RRT at this time, but may require if UOP does not increase 3. Pneumomediastinum/PTX d/t COVID 
a. Small. b. SC air on CXR  - present prior to CVC insertion 
c. Maintain vent support 
d. Monitor w/ daily CXR - improved 
e. No targets for CTube 
f. No intervention unless PTX progresses to > 50% or goes to tension 4. Septic shock d/t COVID 
a. Levo - wean as able 5. DM - insulin 6. Ovarian CA, metastatic, Stage 3c  
a. Dx'd 2012 
b. Had been on palliative chemo 
c. See H&P for Onc hx 
7. DVT 
a. Therapeutic Lovenox 8. UTI, E faecalis, MDR 
a. Vanco Day  - complete course - dosing by Level  
b. Maintain mcclain 
c. ProCal remains low 9. Pulm HTN, h/o - as above 10. Anemia, acute on chronic 
a. Transfuse prn 
b. H/h drifting down, PRBCs today/tomorrow 
c. Fibrinogen hi, no evidence of DIC 11. SHABANA on CPAP at home 
a. Now intubated 12. Hypothyroid 
a. Synthroid 13. Depression 
a. Zoloft 14. GOC - Palliative care following 
a. Given pt's underlying comorbidities, now intubation, her mortality approaches 100%. b. Poor to grim prognosis 
c. She remains Full Code per her wishes 15. COVID: Rapid +  
a. Steroids, VitC/Zinc, Actemra completed 
b.  Not a remdesivir candidate d/t CPAP & progression of course 
c. Not a transplant candidate, therefore not an ECMO candidate d/t ongoing/active malignancy  
d. Pt has received all known and available therapies for COVID  
e. Continue supportive care 16. SBP Goal of: > 90 mmHg 17. MAP Goal of: > 65 mmHg 18. IVFs: prn 19. Transfusion Trigger (Hgb): <7 g/dL 20. Respiratory Goals: 
a. Chlorhexidine  
b. Optimize PEEP/Ventilation/Oxygenation 
c. Goal Tidal Volume 6 cc/kg based on IBW 
d. Aim for lung protective ventilation 
e. Head of bed > 30 degrees 
f. Aggressive bronchopulmonary hygiene 
g. Incentive spirometry 21. Pulmonary toilet: Duo-Nebs 22. SpO2 Goal: > 92% 23. Keep K>4; Mg>2  
24. PT/OT: PT consulted and on board, OT consulted and on board and Speech therapy consulted and on board 25. Discussed Plan of Care/Code Status: Full Code 26. Discussed Care Plan with Bedside RN 
27. Documentation of Current Medications 28. Further as below: 
 
F - Feeding:  Yes TFs - increase to goal or per RD rec's A - Analgesia: Fentanyl S - Sedation: Propofol, Versed and Fentanyl T - DVT Prophylaxis: Lovenox H - Head of Bed: > 30 Degrees U - Ulcer Prophylaxis: Pepcid (famotidine) G - Glycemic Control: Insulin S - Spontaneous Breathing Trial: No 
B - Bowel Regimen: MiraLax I - Indwelling Catheter: 
 Tubes: None Lines: Peripheral IV, [employer] and [address] Drains: Childers Catheter D - De-escalation of Antibiotics: as above Multidisciplinary Rounds Completed: Yes ABCDEF Bundle/Checklist Completed: 
Yes SPECIAL EQUIPMENT None DISPOSITION Stay in ICU Subjective:  
Progress Note: 4/25/2021 Reason for ICU Admission: acute hypoxemic respiratory failure d/t COVID  
 
HPI: Josh Torres is a 62 y.o. female who has a PMH of ovarian cancer s/p recent chemotherapy, CKD, urinary retention, GERD, asthma, chronic pain, anemia, depression and anxiety who presented to the ED via EMS with progressively worsening SOB x2 days.  She denied fever, chills, N/V/D chest pain, or other constitutional symptoms. Oxygen sats were reported to be 50% on RA at home. She was placed on NRB by EMS and had oxygen sats in the 70s upon arrival to the ED. ABG on NRB was 7.49/29/58. She was transitioned to CPAP 14 100%.   
She was treated with empiric antibiotics. She was found to be COVID positive.  
  
ICU was consulted and she was accepted to the ICU service.  
  
Note: + contrast allergy Overnight Events: No major o/n events noted. Essentially anuric at this point. Some bleeding around CVC site, may be controlled. H/H drifting. Levo remains, coming down. Pam TFs. Remains paralyzed. POD: 
* No surgery found * S/P:  
 
 
Active Problem List:  
 
Problem List  Date Reviewed: 4/14/2021 Codes Class Carcinomatosis ([facility] ICD-10-CM: C80.0 ICD-9-CM: 199.0 Controlled type 2 diabetes mellitus without complication, with long-term current use of insulin (HCC) ICD-10-CM: E11.9, Z79.4 ICD-9-CM: 250.00, V58.67 Morbid obesity ([facility] ICD-10-CM: E66.01 
ICD-9-CM: 278.01   
   
 [id number] ICD-10-CM: U07.1 ICD-9-CM: 079.89 Fatigue ICD-10-CM: R53.83 ICD-9-CM: 780.79 SOB (shortness of breath) ICD-10-CM: R06.02 
ICD-9-CM: 786.05 Lymphedema of both lower extremities ICD-10-CM: I89.0 ICD-9-CM: 481.6 S/P ureteral stent placement ICD-10-CM: Z96.0 ICD-9-CM: V45.89 Anemia associated with chemotherapy ICD-10-CM: D64.81, T45.1X5A 
ICD-9-CM: 285.3, E933.1 Mass of chest wall ICD-10-CM: R22.2 ICD-9-CM: 786.6 Hypertension ICD-10-CM: I10 
ICD-9-CM: 401.9 Pain due to malignant neoplasm metastatic to bone (HCC) ICD-10-CM: G89.3, C79.51 
ICD-9-CM: 338.3 Overview Signed 7/18/2018  1:46 PM by MALLIKA Kee Mediastinal chest wall mass Diabetes ([facility] ICD-10-CM: E11.9 ICD-9-CM: 250.00 Hypothyroidism ICD-10-CM: E03.9 ICD-9-CM: 244.9  Anemia ICD-10-CM: D64.9 ICD-9-CM: 285.9 Contrast media allergy ICD-10-CM: Z91.041 ICD-9-CM: V15.08 On antineoplastic chemotherapy ICD-10-CM: Z79.899 ICD-9-CM: V58.69 Reactive depression ICD-10-CM: F32.9 ICD-9-CM: 300.4 Type 2 diabetes mellitus with nephropathy (HCC) ICD-10-CM: E11.21 
ICD-9-CM: 250.40, 583.81 Lymphadenopathy, mediastinal ICD-10-CM: R59.0 ICD-9-CM: 785.6 Pulmonary hypertension, mild (HCC) ICD-10-CM: I27.20 ICD-9-CM: 416.8 Renal calculi ICD-10-CM: N20.0 ICD-9-CM: 592.0 Hyperglycemia due to type 2 diabetes mellitus ([facility] ICD-10-CM: E11.65 ICD-9-CM: 250.00 [facility] ([facility] ICD-10-CM: C56.9 ICD-9-CM: 183.0 Acute deep vein thrombosis (DVT) of proximal vein of right lower extremity (HCC) ICD-10-CM: I82.4Y1 ICD-9-CM: 453.41 Port-A-Cath in place ICD-10-CM: Z95.828 ICD-9-CM: V45.89 Dyslipidemia (high LDL; low HDL) ICD-10-CM: E78.5 ICD-9-CM: 272.4 SHABANA on CPAP ICD-10-CM: G47.33, Z99.89 ICD-9-CM: 327.23, V46.8 Abnormal mammogram ICD-10-CM: R92.8 ICD-9-CM: 793.80 Past Medical History:  
 
 has a past medical history of Adverse effect of anesthesia, Anemia, Arthritis, Asthma, BRCA negative (1/2013), Calculus of kidney, Chronic kidney disease, Chronic pain, CINV (chemotherapy-induced nausea and vomiting) (8/21/2014), Decreased hearing, right, Diabetes ([facility] (Age 46), Environmental allergies, Fibromyalgia, GERD (gastroesophageal reflux disease), pulmonary embolus during pregnancy (1/18/2015), Hydronephrosis due to obstruction of ureter (3/18/2019), Hypertension, Ill-defined condition, Ill-defined condition (2016), Mass of chest wall (05/2018), Morbid obesity ([address] Murmur, heart, Other and unspecified hyperlipidemia, Ovarian cancer ([address] (9/2012, 1/2014), Psychiatric disorder, Rectal bleeding, Thromboembolus ([address] (1993), Thyroid disease, and Unspecified sleep apnea. Past Surgical History: has a past surgical history that includes pr breast surgery procedure unlisted; hx gyn (1990 1993); hx hysterectomy (9/2012); hx vascular access (11/4/2015); hx vascular access (Left, 2017); hx urological (Right, 2017); hx orthopaedic (Right, 1990); and hx orthopaedic (Right, 1980'S). Home Medications:  
 
Prior to Admission medications Medication Sig Start Date End Date Taking? Authorizing Provider  
albuterol (PROVENTIL HFA, VENTOLIN HFA, PROAIR HFA) 90 mcg/actuation inhaler Take 2 Puffs by inhalation every four (4) hours as needed for Wheezing. Yes Provider, Historical  
furosemide (Lasix) 20 mg tablet Take 20 mg by mouth two (2) times a day. Yes Provider, Historical  
Eliquis 5 mg tablet TAKE 1 TAB BY MOUTH TWO (2) TIMES A DAY FOR 30 DAYS. 3/1/21  Yes Andrae Madden PA-C  
omega-3 acid ethyl esters (LOVAZA) 1 gram capsule TAKE 1 CAPSULE BY MOUTH TWICE A DAY 10/6/20  Yes Reema Rossi MD  
rosuvastatin (CRESTOR) 10 mg tablet Take 10 mg by mouth nightly. Yes Provider, Historical  
doxazosin (CARDURA) 1 mg tablet Take 1 mg by mouth daily. Yes Provider, Historical  
LEVEMIR FLEXTOUCH U-100 INSULN 100 unit/mL (3 mL) inpn INJECT 30 UNITS IN AM AND 50 UNITS AT NIGHT. INCREASE AS DIRECTED TO  UNITS/DAY. 8/15/19  Yes Reema Rossi MD  
levothyroxine (SYNTHROID) 150 mcg tablet TAKE 1 TABLET BY MOUTH EVERY DAY BEFORE BREAKFAST 4/2/19  Yes Dilcia Bal MD  
albuterol (PROVENTIL VENTOLIN) 2.5 mg /3 mL (0.083 %) nebulizer solution 2.5 mg by Nebulization route every four (4) hours as needed for Wheezing. Yes Provider, Historical  
SYMBICORT 160-4.5 mcg/actuation HFAA Take 2 Puffs by inhalation two (2) times daily as needed. 12/26/17  Yes Nelly Rivera MD  
gabapentin (NEURONTIN) 100 mg capsule Take 2 Caps by mouth nightly. Max Daily Amount: 200 mg.  Indications: neuropathic pain 2/10/21   Huiras, Dax J, PA-C  
dexAMETHasone (DECADRON) 4 mg tablet TAKE 2 TABLETS WITH BREAKFAST THE DAY BEFORE CHEMO AND FOR 2 DAYS AFTER CHEMO 1/28/21   Angelic Almeida MD  
lidocaine-prilocaine (EMLA) topical cream Apply small amount over port area and cover with band aid one hour before chemo 6/30/20   Angelic Almeida MD  
ondansetron (ZOFRAN ODT) 4 mg disintegrating tablet Take 1 Tab by mouth every eight (8) hours as needed for Nausea. Indications: nausea and vomiting caused by cancer drugs 6/30/20   Angelic Almeida MD  
EPINEPHrine (EPIPEN) 0.3 mg/0.3 mL injection INJECT 0.3 ML BY INTRAMUSCULAR ROUTE ONCE AS NEEDED FOR UP TO 1 DOSE. 1/16/19   Provider, Historical  
azelastine (ASTELIN) 137 mcg (0.1 %) nasal spray 1 [place] by Both Nostrils route daily as needed. Use in each nostril as directed     Provider, Historical  
ascorbic acid, vitamin C, (VITAMIN C) 1,000 mg tablet Take 1,000 mg by mouth daily. Provider, Historical  
cholecalciferol, VITAMIN D3, (VITAMIN D3) 5,000 unit tab tablet Take 5,000 Units by mouth daily. Provider, Historical  
 
 
Allergies/Social/Family History: Allergies Allergen Reactions  Latex Hives  Carboplatin Other (comments) Patient developed shortness of breath, felt faint, coughing, skin flushed and \"itchy\". This occurred on the patients 8th treatment.  Iodinated Contrast Media Rash 13 hr pre-medication prior to IV contrast.  
Patient has done well with 1 hr pre-medication of (Solu-Medrol) 40mg &  (Benadryl) 50mg.  Lipitor [Atorvastatin] Myalgia  Shellfish Containing Products Hives  Tape [Adhesive] Itching and Other (comments) \"op site-- clear,thin tape\"--caused blister  Tomato Hives  Iodine And Iodide Containing Products Rash  Olmesartan Other (comments)  Losartan Other (comments)  
  headaches  Percocet [Oxycodone-Acetaminophen] Other (comments) Fever; however, current home med Social History Tobacco Use  Smoking status: Never Smoker  Smokeless tobacco: Never Used Substance Use Topics  Alcohol use: Yes Comment: 1 drink per month Family History Problem Relation Age of Onset  Hypertension Mother [facility] Arthritis-osteo Mother  Hypertension Father  Arthritis-osteo Father  Cancer Father PROSTATE  COPD Father  Hypertension Brother  Elevated Lipids Brother  Arthritis-osteo Brother  Hypertension Brother  Elevated Lipids Brother  Obesity Brother  Cancer Brother PROSTATE  Anesth Problems Son DELAYED AWAKENING  
 Diabetes Maternal Grandmother  Anesth Problems Paternal Grandmother PATIENT STATES GRANDMOTHER'S HEART STOPPED DURING SURGERY Review of Systems: A comprehensive review of systems was negative except for that written in the HPI. Objective:  
Vital Signs: 
Visit Vitals BP (!) 120/56 Pulse 87 Temp 99.2 °F (37.3 °C) Resp 26 Ht 5' 9\" (1.753 m) Wt 117.9 kg (259 lb 14.8 oz) LMP 2011 SpO2 95% BMI 38.38 kg/m² O2 Flow Rate (L/min): 40 l/min O2 Device: Endotracheal tube, Ventilator Temp (24hrs), Av.6 °F (36.4 °C), Min:96 °F (35.6 °C), Max:99.2 °F (37.3 °C) Intake/Output:  
 
Intake/Output Summary (Last 24 hours) at 2021 0725 Last data filed at 2021 0600 Gross per 24 hour Intake 3846.8 ml Output 380 ml Net 3466.8 ml Physical Exam: 
 
General:  Critically ill, well noursished, well developed, appears stated age Eyes:  Sclera anicteric. Pupils equally round and reactive to light. Mouth/Throat: ETT, OGT Neck: Supple Lungs:   Diminished, course CV:  Regular rate and rhythm,no murmur, click, rub or gallop Abdomen:   Soft, non-tender. bowel sounds normal. non-distended Extremities: No cyanosis or edema Skin: Skin color, texture, turgor normal. no acute rash or lesions Lymph nodes: Cervical and supraclavicular normal  
Musculoskeletal: No swelling or deformity Lines/Devices:  Intact, no erythema, drainage or tenderness Neuro/Psych: Sedated, paralyzed LABS AND  DATA: Personally reviewed Recent Labs  
  04/25/21 
0231 04/24/21 1736 04/24/21 
0152 WBC 19.4*  --  19.5* HGB 7.0* 7.6* 8.8* HCT 22.7* 25.3* 28.4*  
  --  252 Recent Labs  
  04/25/21 
0231 04/24/21 1736 04/24/21 1990 04/24/21 
0152  146* 144 141  
K 5.0 4.4 4.5 4.8  
* 111* 110* 109* CO2 23 26 26 26 BUN 62* 56* 50* 46* CREA 2.97* 2.58* 2.23* 1.69* * 137* 350* 496* CA 8.0* 7.9* 8.5 8.5 MG 2.3  --  2.6* 2.5* PHOS 8.0*  --   --  6.2* Recent Labs  
  04/25/21 0231 04/24/21 
0152 AP 78 87  
TP 6.0* 7.1 ALB 1.8* 2.0*  
GLOB 4.2* 5.1* No results for input(s): INR, PTP, APTT, INREXT, INREXT in the last 72 hours. No results for input(s): PHI, PCO2I, PO2I, FIO2I in the last 72 hours. No results for input(s): CPK, CKMB, TROIQ, BNPP in the last 72 hours. Hemodynamics:  
PAP:   CO:    
Wedge:   CI:    
CVP:    SVR:    
  PVR:    
 
Ventilator Settings: 
Mode Rate Tidal Volume Pressure FiO2 PEEP PRVC   400 ml    70 % 15 cm H20 Peak airway pressure: 34 cm H2O Minute ventilation: 9.6 l/min MEDS: Reviewed Chest X-Ray: CXR Results  (Last 48 hours) 04/25/21 0452  XR CHEST PORT Final result Impression: No significant change in mixed bilateral alveolar and interstitial disease. Improvement in subcutaneous emphysema. Narrative:  EXAM:  XR CHEST PORT INDICATION:  f/u PTX/Pneumomediastinum, COVID+ COMPARISON:  4/24/2021 FINDINGS:  
   
A portable AP radiograph of the chest was obtained at 4:42 hours. The tip of the  
right jugular catheter is in the region of the atriocaval junction. The ET tube  
is in satisfactory position. The distal portion of the enteric tube which can be  
seen overlies the gastroesophageal junction.   
   
  There has been no significant change in the bilateral mixed alveolar and  
interstitial opacities, with a basilar predominance. The cardiac silhouette  
remains unchanged. The tip of the infusion catheter is in the region of the  
atriocaval junction. There has been a decrease in subcutaneous emphysema in the  
base of the neck. 04/24/21 0126  XR CHEST PORT Final result Impression:  Triple lumen catheter is in appropriate position for use with no associated  
pneumothorax. ET tube is in appropriate position as well. Hazy interstitial and parenchymal opacities are stable. Nonspecific interval increased subcutaneous emphysema in the right cervical  
region. Clinical correlation. Narrative:  Clinical history: New central line INDICATION:   New central line COMPARISON: 4/23/2021 FINDINGS:  
AP portable upright view of the chest demonstrates a stable enlarged  
cardiopericardial silhouette. Hazy interstitial and parenchymal opacities are  
stable. Port catheter on the left. ET tube at the inferior margin of the  
clavicle unchanged. Triple-lumen catheter on the right. No pneumothorax. Catheter tip is in appropriate position for use. .Subcutaneous emphysema on the  
right. .There is no pneumothorax. . Patient is on a cardiac monitor. 04/23/21 2220  XR CHEST PORT Final result Impression:  ET tube is in appropriate position. Interval increased interstitial and parenchymal opacities bilaterally. Narrative:  Clinical history: s/p intubation INDICATION:   s/p intubation COMPARISON: 4/23/2021 FINDINGS:  
AP portable upright view of the chest demonstrates a stable enlarged  
cardiopericardial silhouette. Extensive hazy interstitial and parenchymal  
opacities are increased. .ET tube lies at the level of the [address]. Port catheter  
on the left. .There is no pneumothorax. . Patient is on a cardiac monitor. 04/23/21 0842  XR CHEST PORT Final result  Impression:  Increased bilateral interstitial and alveolar opacities, compatible with COVID pneumonia. Narrative:  INDICATION: COVID. Hypoxia. COMPARISON: 4/19/2021 FINDINGS: AP portable imaging of the chest performed at 8:25 AM demonstrates a  
stable cardiomediastinal silhouette left subclavian Port-A-Cath is unchanged in  
position. Bilateral interstitial and alveolar opacities are increased. There is  
no pleural effusion or pneumothorax. Images:  
Reviewed ECHO: 1/16/2020 Result status: Final result · Normal cavity size and systolic function (ejection fraction normal). Mild concentric hypertrophy. Estimated left ventricular ejection fraction is 55 - 60%. Visually measured ejection fraction. No regional wall motion abnormality noted. · Mildly dilated left atrium. · Mild aortic valve sclerosis. · Trace mitral valve regurgitation is present. CRITICAL CARE CONSULTANT NOTE I had a face to face encounter with the patient, reviewed and interpreted patient data including clinical events, labs, images, vital signs, I/O's, and examined patient. I have discussed the case and the plan and management of the patient's care with the consulting services, the bedside nurses and the respiratory therapist.   
 
NOTE OF PERSONAL INVOLVEMENT IN CARE This patient has a high probability of imminent, clinically significant deterioration, which requires the highest level of preparedness to intervene urgently. I participated in the decision-making and personally managed or directed the management of the following life and organ supporting interventions that required my frequent assessment to treat or prevent imminent deterioration. I personally spent 42 minutes of critical care time. This is time spent at this critically ill patient's bedside actively involved in patient care as well as the coordination of care and discussions with the patient's family. This does not include any procedural time which has been billed separately.  
 
Hermon Bernheim, DO [facility] 4/25/2021

## 2021-04-25 NOTE — CONSULTS
[address] Consult Note NAME: Vy Daley :  1960 MRN:  [id number] Date/Time: 2021 Risk of deterioration: high Assessment:    Plan: 
FELICIA/Shock-on levo 6 COVID (+) Ovarian cancer Hx of hydro/stent/retention Currently intubated/sedated/paralyzed-on 100% fi02 with sats of 70% Reviewed/agree with CCU note Grim prognosis Renal failure is worse-becoming anuric-creatinine up from 1.69 to 2.9 in 24 hours Change lr to ns Given her covid (+), active ovarian cancer she is not a candidate for rrt in my opinion. I have yet to see over the last year any covid (+) pt needing HD/CRRT recover. Palliative consult in AM  
 
HPI: Bc Soares a 62 y.o. female who has a PMH of ovarian cancer s/p recent chemotherapy, CKD, urinary retention, GERD, asthma, chronic pain, anemia, depression and anxiety who presented to the ED via EMS with progressively worsening SOB Reportedly pt was a DNI-but was convinced by son to get intubated. Subjective: Chief Complaint:  Unable to give Review of Systems: Patient was not able to provide review of systems due to mental status change/acute illness Objective: VITALS:  
Last 24hrs VS reviewed since prior progress note. Most recent are: 
Visit Vitals /61 Pulse 81 Temp 98.9 °F (37.2 °C) Resp 28 Ht 5' 9\" (1.753 m) Wt 117.9 kg (259 lb 14.8 oz) SpO2 96% BMI 38.38 kg/m² SpO2 Readings from Last 6 Encounters:  
21 96% 21 99% 21 96% 12/10/20 98% 20 98% 20 98% O2 Flow Rate (L/min): 40 l/min Intake/Output Summary (Last 24 hours) at 2021 1050 Last data filed at 2021 1000 Gross per 24 hour Intake 3775.4 ml Output 385 ml Net 3390.4 ml Telemetry Reviewed PHYSICAL EXAM: Seen thru glass icu door-limiting PPE in setting of active covid General   well developed, well nourished, appears stated age, in no acute distress and intubated/sedated/paralyzed 
mcclain Lab Data Reviewed: (see below) Medications Reviewed: (see below) PMH/SH reviewed - no change compared to H&P 
_________________________________________ 
___________________________________________________ Attending Physician: Chris Reyes MD  
 
____________________________________________________ MEDICATIONS: 
Current Facility-Administered Medications Medication Dose Route Frequency  0.45% sodium chloride infusion  100 mL/hr IntraVENous CONTINUOUS  
 epoprostenol (VELETRI) 30 mcg/mL in 0.9% sodium chloride 50 mL inhalation solution  30 ng/kg/min (Ideal) Inhalation CONTINUOUS  
 insulin regular (NOVOLIN R, HUMULIN R) 100 Units in 0.9% sodium chloride 100 mL infusion  0-50 Units/hr IntraVENous TITRATE  dextrose (D50W) injection syrg 12.5-25 g  25-50 mL IntraVENous PRN  chlorhexidine (ORAL CARE KIT) 0.12 % mouthwash 15 mL  15 mL Oral Q12H  
 docusate (COLACE) 50 mg/5 mL oral liquid 200 mg  200 mg Per NG tube BID  magnesium hydroxide (MILK OF MAGNESIA) 400 mg/5 mL oral suspension 30 mL  30 mL Per NG tube DAILY PRN  
 bisacodyL (DULCOLAX) suppository 10 mg  10 mg Rectal DAILY PRN  
 bisacodyL (DULCOLAX) tablet 5 mg  5 mg Oral DAILY PRN  
 enoxaparin (LOVENOX) injection 120 mg  1 mg/kg SubCUTAneous Q24H  
 famotidine (PF) (PEPCID) 20 mg in 0.9% sodium chloride 10 mL injection  20 mg IntraVENous Q24H  
 vancomycin (VANCOCIN) 1,000 mg in 0.9% sodium chloride 250 mL (VIAL-MATE)  1,000 mg IntraVENous Q12H  prochlorperazine (COMPAZINE) with saline injection 5 mg  5 mg IntraVENous Q3H PRN  
 fat emulsion 20% (LIPOSYN, INTRAlipid) infusion 250 mL  250 mL IntraVENous Q MON, WED & FRI  propofol (DIPRIVAN) 10 mg/mL infusion  0-50 mcg/kg/min IntraVENous TITRATE  
 NOREPINephrine (LEVOPHED) 8 mg in 5% dextrose 250mL (32 mcg/mL) infusion  0.5-16 mcg/min IntraVENous TITRATE  fentaNYL (PF) 1,500 mcg/30 mL (50 mcg/mL) infusion  0-300 mcg/hr IntraVENous TITRATE  midazolam (VERSED) 100 mg in 0.9% sodium chloride 100 mL infusion  0-10 mg/hr IntraVENous TITRATE  cisatracurium (NIMBEX) 200 mg in 0.9% sodium chloride 100 mL (2 mg/mL) infusion  0-10 mcg/kg/min IntraVENous TITRATE  methylPREDNISolone (PF) (Solu-MEDROL) injection 60 mg  60 mg IntraVENous Q12H  Vancomycin - Pharmacy to Dose   Other PRN  
 hydrALAZINE (APRESOLINE) 20 mg/mL injection 20 mg  20 mg IntraVENous Q4H PRN  
 arformoteroL (BROVANA) neb solution 15 mcg  15 mcg Nebulization BID RT And  
 budesonide (PULMICORT) 250 mcg/2ml nebulizer susp  500 mcg Nebulization BID RT  
 sodium chloride (NS) flush 5-10 mL  5-10 mL IntraVENous PRN  
 ascorbic acid (vitamin C) (VITAMIN C) tablet 500 mg  500 mg Oral DAILY  sodium chloride (NS) flush 5-40 mL  5-40 mL IntraVENous Q8H  
 acetaminophen (TYLENOL) tablet 650 mg  650 mg Oral Q6H PRN Or  
 acetaminophen (TYLENOL) suppository 650 mg  650 mg Rectal Q6H PRN  polyethylene glycol (MIRALAX) packet 17 g  17 g Oral DAILY PRN  
 ondansetron (ZOFRAN) injection 4 mg  4 mg IntraVENous Q6H PRN  
 levothyroxine (SYNTHROID) tablet 150 mcg  150 mcg Oral 6am  
 sertraline (ZOLOFT) tablet 50 mg  50 mg Oral DAILY  glucose chewable tablet 16 g  4 Tab Oral PRN  
 glucagon (GLUCAGEN) injection 1 mg  1 mg IntraMUSCular PRN  
 alcohol 62% (NOZIN) nasal  1 Ampule  1 Ampule Topical Q12H  
  
 
LABS: 
Recent Labs  
  04/25/21 
0231 04/24/21 1736 04/24/21 
0152 WBC 19.4*  --  19.5* HGB 7.0* 7.6* 8.8* HCT 22.7* 25.3* 28.4*  
  --  252 Recent Labs  
  04/25/21 
0231 04/24/21 
1736 04/24/21 
0442 04/24/21 
0152 04/23/21 
0844  146* 144 141 141  
K 5.0 4.4 4.5 4.8 4.2 * 111* 110* 109* 110* CO2 23 26 26 26 26 BUN 62* 56* 50* 46* 27* CREA 2.97* 2.58* 2.23* 1.69* 0.93 * 137* 350* 496* 326* CA 8.0* 7.9* 8.5 8.5 8.5 MG 2.3  --  2.6* 2.5* 2.2 PHOS 8.0*  --   --  6.2* 2.1* Recent Labs 04/25/21 
0231 04/24/21 
8897 04/23/21 
6115 ALT 24 29 37 AP 78 87 75 TBILI 0.5 0.6 0.9 TP 6.0* 7.1 6.8 ALB 1.8* 2.0* 2.0*  
GLOB 4.2* 5.1* 4.8* No results for input(s): INR, PTP, APTT, INREXT in the last 72 hours. Recent Labs  
  04/25/21 
0231 04/24/21 
0152 FERR 2,888* 2,682* Recent Labs  
  04/25/21 
0402 04/24/21 
2307 PH 7.21* 7.20* PCO2 54* 62* PO2 106* 79* No results for input(s): CPK, CKNDX, TROIQ in the last 72 hours. No lab exists for component: CPKMB Lab Results Component Value Date/Time  Glucose (POC) 178 (H) 04/24/2021 09:00 PM  
 Glucose (POC) 171 (H) 04/24/2021 07:50 PM  
 Glucose (POC) 154 (H) 04/24/2021 06:46 PM  
 Glucose (POC) 140 (H) 04/24/2021 05:42 PM  
 Glucose (POC) 131 (H) 04/24/2021 04:42 PM

## 2021-04-25 NOTE — PROGRESS NOTES
04/25/21 6602 ABCDEF Bundle SBT Safety Screen Passed No  
SBT Screen Reason for Failure FiO2 > 50%;PEEP > 7.5

## 2021-04-26 NOTE — PROGRESS NOTES
[facility] Progress Note NAME: Boo Benítez :  1960 MRN:  [id number] Date/Time: 2021 Risk of deterioration: high Assessment:    Plan: 
FELICIA/Shock- 
COVID (+) Ovarian cancer Anemia Hx of hydro/stent/retention Resp failure MSOF Currently intubated/sedated/paralyzed-on 100% fi02 with sats of 70% Grim prognosis Renal failure is worse- anuric- Stop ns Given her covid (+), active ovarian cancer she is not a candidate for rrt in my opinion. I have yet to see over the last year any covid (+) pt needing HD/CRRT recover. Palliative consult She is not going to survive this hospitalization Reportedly pt was a DNI-but was convinced by son to get intubated. Subjective: Chief Complaint:  Unable to give Review of Systems: Patient was not able to provide review of systems due to mental status change/acute illness Objective: VITALS:  
Last 24hrs VS reviewed since prior progress note. Most recent are: 
Visit Vitals BP (!) 82/49 Pulse (!) 105 Temp 98.3 °F (36.8 °C) Resp 25 Ht 5' 9\" (1.753 m) Wt 114.8 kg (253 lb 1.4 oz) SpO2 91% BMI 37.37 kg/m² SpO2 Readings from Last 6 Encounters:  
21 91% 21 99% 21 96% 12/10/20 98% 20 98% 20 98% O2 Flow Rate (L/min): 40 l/min Intake/Output Summary (Last 24 hours) at 2021 1246 Last data filed at 2021 2242 Gross per 24 hour Intake 2394.29 ml Output 20 ml Net 2374.29 ml Telemetry Reviewed PHYSICAL EXAM: Seen thru glass icu door-limiting PPE in setting of active covid General   well developed, well nourished, appears stated age, in no acute distress and intubated/sedated/paralyzed 
mcclain Lab Data Reviewed: (see below) Medications Reviewed: (see below) PMH/SH reviewed - no change compared to H&P 
_________________________________________ 
___________________________________________________ Attending Physician: Shay Montelongo MD  
 
____________________________________________________ MEDICATIONS: 
Current Facility-Administered Medications Medication Dose Route Frequency  0.9% sodium chloride infusion 250 mL  250 mL IntraVENous PRN  
 vasopressin (VASOSTRICT) 20 Units in 0.9% sodium chloride 100 mL infusion  0-0.04 Units/min IntraVENous TITRATE  sodium zirconium cyclosilicate (LOKELMA) powder packet 5 g  5 g Oral NOW  
 0.45% sodium chloride infusion  100 mL/hr IntraVENous CONTINUOUS  
 epoprostenol (VELETRI) 30 mcg/mL in 0.9% sodium chloride 50 mL inhalation solution  30 ng/kg/min (Ideal) Inhalation CONTINUOUS  
 insulin regular (NOVOLIN R, HUMULIN R) 100 Units in 0.9% sodium chloride 100 mL infusion  0-50 Units/hr IntraVENous TITRATE  dextrose (D50W) injection syrg 12.5-25 g  25-50 mL IntraVENous PRN  chlorhexidine (ORAL CARE KIT) 0.12 % mouthwash 15 mL  15 mL Oral Q12H  
 docusate (COLACE) 50 mg/5 mL oral liquid 200 mg  200 mg Per NG tube BID  magnesium hydroxide (MILK OF MAGNESIA) 400 mg/5 mL oral suspension 30 mL  30 mL Per NG tube DAILY PRN  
 bisacodyL (DULCOLAX) suppository 10 mg  10 mg Rectal DAILY PRN  
 bisacodyL (DULCOLAX) tablet 5 mg  5 mg Oral DAILY PRN  
 enoxaparin (LOVENOX) injection 120 mg  1 mg/kg SubCUTAneous Q24H  
 famotidine (PF) (PEPCID) 20 mg in 0.9% sodium chloride 10 mL injection  20 mg IntraVENous Q24H  prochlorperazine (COMPAZINE) with saline injection 5 mg  5 mg IntraVENous Q3H PRN  
 fat emulsion 20% (LIPOSYN, INTRAlipid) infusion 250 mL  250 mL IntraVENous Q MON, WED & FRI  propofol (DIPRIVAN) 10 mg/mL infusion  0-50 mcg/kg/min IntraVENous TITRATE  
 NOREPINephrine (LEVOPHED) 8 mg in 5% dextrose 250mL (32 mcg/mL) infusion  0.5-16 mcg/min IntraVENous TITRATE  fentaNYL (PF) 1,500 mcg/30 mL (50 mcg/mL) infusion  0-300 mcg/hr IntraVENous TITRATE  midazolam (VERSED) 100 mg in 0.9% sodium chloride 100 mL infusion  0-10 mg/hr IntraVENous TITRATE  cisatracurium (NIMBEX) 200 mg in 0.9% sodium chloride 100 mL (2 mg/mL) infusion  0-10 mcg/kg/min IntraVENous TITRATE  methylPREDNISolone (PF) (Solu-MEDROL) injection 60 mg  60 mg IntraVENous Q12H  hydrALAZINE (APRESOLINE) 20 mg/mL injection 20 mg  20 mg IntraVENous Q4H PRN  
 arformoteroL (BROVANA) neb solution 15 mcg  15 mcg Nebulization BID RT And  
 budesonide (PULMICORT) 250 mcg/2ml nebulizer susp  500 mcg Nebulization BID RT  
 sodium chloride (NS) flush 5-10 mL  5-10 mL IntraVENous PRN  
 ascorbic acid (vitamin C) (VITAMIN C) tablet 500 mg  500 mg Oral DAILY  sodium chloride (NS) flush 5-40 mL  5-40 mL IntraVENous Q8H  
 acetaminophen (TYLENOL) tablet 650 mg  650 mg Oral Q6H PRN Or  
 acetaminophen (TYLENOL) suppository 650 mg  650 mg Rectal Q6H PRN  polyethylene glycol (MIRALAX) packet 17 g  17 g Oral DAILY PRN  
 ondansetron (ZOFRAN) injection 4 mg  4 mg IntraVENous Q6H PRN  
 levothyroxine (SYNTHROID) tablet 150 mcg  150 mcg Oral 6am  
 sertraline (ZOLOFT) tablet 50 mg  50 mg Oral DAILY  glucose chewable tablet 16 g  4 Tab Oral PRN  
 glucagon (GLUCAGEN) injection 1 mg  1 mg IntraMUSCular PRN  
 alcohol 62% (NOZIN) nasal  1 Ampule  1 Ampule Topical Q12H  
  
 
LABS: 
Recent Labs  
  04/26/21 
0106 04/25/21 
0231 WBC 28.4* 19.4* HGB 6.5* 7.0*  
HCT 20.7* 22.7*  
 174 Recent Labs  
  04/26/21 
0106 04/25/21 
0231 04/24/21 
1736 04/24/21 
8298 04/24/21 
4568  142 146* 144 141  
K 5.2* 5.0 4.4 4.5 4.8  
 110* 111* 110* 109* CO2 21 23 26 26 26 BUN 76* 62* 56* 50* 46* CREA 4.08* 2.97* 2.58* 2.23* 1.69* * 163* 137* 350* 496* CA 7.1* 8.0* 7.9* 8.5 8.5 MG 2.1 2.3  --  2.6* 2.5* PHOS 9.0* 8.0*  --   --  6.2* Recent Labs  
  04/26/21 
0106 04/25/21 
0231 04/24/21 
0152 ALT 39 24 29  78 87 TBILI 0.7 0.5 0.6 TP 5.8* 6.0* 7.1 ALB 1.9* 1.8* 2.0*  
GLOB 3.9 4.2* 5.1* No results for input(s): INR, PTP, APTT, INREXT, INREXT in the last 72 hours. Recent Labs  
  04/26/21 
0106 04/25/21 
0231 FERR 2,678* 2,888* Recent Labs  
  04/25/21 
0402 04/24/21 
2666 PH 7.21* 7.20* PCO2 54* 62* PO2 106* 79* No results for input(s): CPK, CKNDX, TROIQ in the last 72 hours. No lab exists for component: CPKMB Lab Results Component Value Date/Time  Glucose (POC) 236 (H) 04/25/2021 04:46 PM  
 Glucose (POC) 178 (H) 04/24/2021 09:00 PM  
 Glucose (POC) 171 (H) 04/24/2021 07:50 PM  
 Glucose (POC) 154 (H) 04/24/2021 06:46 PM  
 Glucose (POC) 140 (H) 04/24/2021 05:42 PM

## 2021-04-26 NOTE — PROGRESS NOTES
Spiritual Care Assessment/Progress Note [address] 
 
 
NAME: Dia Bunn      MRN: [id number] AGE: 62 y.o. SEX: female Hinduism Affiliation: Qire  
Language: [place] 4/26/2021     Total Time (in minutes): 9 Spiritual Assessment begun in [facility] 2 CRITICAL CARE 2 through conversation with: 
  
    []Patient        [] Family    [] Friend(s) Reason for Consult: Palliative Care, Initial/Spiritual Assessment Spiritual beliefs: (Please include comment if needed) 
   [] Identifies with a isaac tradition:     
   [] Supported by a isaac community:        
   [] Claims no spiritual orientation:       
   [] Seeking spiritual identity:            
   [] Adheres to an individual form of spirituality:       
   [x] Not able to assess:                   
 
    
Identified resources for coping:  
   [] Prayer                           
   [] Music                  [] Guided Imagery 
   [] Family/friends                 [] Pet visits [] Devotional reading                         [x] Unknown 
   [] Other:                              
 
 
Interventions offered during this visit: (See comments for more details) Patient Interventions: Initial visit Plan of Care: 
 
 [x] Support spiritual and/or cultural needs  
 [] Support AMD and/or advance care planning process    
 [] Support grieving process 
 [] Coordinate Rites and/or Rituals  
 [] Coordination with community clergy [] No spiritual needs identified at this time 
 [] Detailed Plan of Care below (See Comments)  [] Make referral to Music Therapy 
[] Make referral to Pet Therapy    
[] Make referral to Addiction services 
[] Make referral to [facility] 
[] Make referral to Spiritual Care Partner 
[] No future visits requested       
[x] Follow up upon further referrals Comments:  Attempted visit in CCU for spiritual assessment.  unable to enter room due to COVID+ isolation precautions.   Unable to assess for spiritual needs or concerns at this time. Marden Baumgarten, MPS, [facility], Staff  [facility]  Paging Service  [phone])

## 2021-04-26 NOTE — PROGRESS NOTES
1900: Bedside and Verbal shift change report received from Evan Abbott, [address]: Shift assessment completed. Significant bleeding from pt's CVC site noted again- Quick clot dressing and new dressing applied. NP Luisa aware. Will continue to assess Temp was 96 F axillary. Bear hugger applied 2200: Large amt of BM noted and CVC site has been bleeding again. Chlorhexidine bathe completed. New dressing changed. GI residual is 50 ml. Tube feeding rate approached to goal rate, 40 ml/hr 
 
0000: Reassessment complete. 6384-2589: Hemoglobin is 6.5- NP Luisa notified. 1 unit of PRBC given. 0400: Reassessment complete. Temp is 98.3 axillary. Bear hugger is off. GI residual is 150 ml. 
 
BP and O2 sat is getting worse. Levophed increased to 15 mcg/min and FiO2 setting increased to 100 % at this time. Heart rate is also from NSR to Sinus tachy (100s). 0600: Total of UOP is only 20 ml over night. CVC site Dressing changed again. 0730: Bedside and Verbal shift change report given to Warren Danielle RN (oncoming nurse) by Alessia Moser RN (offgoing nurse).

## 2021-04-26 NOTE — PROGRESS NOTES
Nutrition Note Chart reviewed, case discussed during CCU rounds. Pt's pressor needs are escalating, vaso started this morning. MAP's are in the 52's. Recommended holding TF for now given STEPHANIE risk, Dr. Tatyana Loza is in agreement. Will revisit nutrition support tomorrow. Palliative consulted in the mean time. Electronically signed by Clara Bates RD, [address]  on 4/26/2021 at 12:56 PM 
 
Contact: [phone]

## 2021-04-26 NOTE — PROGRESS NOTES
1900 Bedside and Verbal shift change report given to Dandre Daily (oncoming nurse) by Carlitos Ventura (offgoing nurse). Report included the following information SBAR, Kardex, Intake/Output, MAR, Recent Results and Cardiac Rhythm ST.  
0700 attempted to titrate down sedation and pressors. Prop down to 25 mcg/kg/min, fentanyl at 250 mcg/hr, Vaso still at 0.04 units/min, Levo still at 35 mcg/min. Right IJ CVL dressing required being changed x4 times with hand held pressure until bleeding stopped each time ( >20 mins each time). Pt unresponsive, except for labored resp after Fenatnyl was decreased to 200 mcg, back up to 250 mcg and pt appeared much more comfortable. discussed the purulent that was coming from [place]. Pt had 2 mod sized, dark brown loose stools. Bedside and Verbal shift change report given to Carlitos Ventura (oncoming nurse) by Dandre Romero (offgoing nurse). Report included the following information SBAR, Kardex, Procedure Summary, Intake/Output, MAR, Recent Results and Cardiac Rhythm NSR.

## 2021-04-26 NOTE — PROGRESS NOTES
04/26/21 3437 ABCDEF Bundle SBT Safety Screen Passed No  
SBT Screen Reason for Failure FiO2 > 50%;PEEP > 7.5

## 2021-04-26 NOTE — PROGRESS NOTES
Palliative Medicine Consult Maurice: [id number] ([id number]) Patient Name: Alem Hensley YOB: 1960 Date of Initial Consult: 4/19/21 Reason for Consult: care decisions Requesting Provider: Stanton López MD  
Primary Care Physician: Leno Palafox MD 
 
 SUMMARY:  
Alem Hensley is a 62 y.o. with a past history of metastatic ovarian cancer on palliative (not curative) chemotherapy, retention, GERD, asthma, chronic pain, anemia diabetes, chronic kidney disease, depression and anxiety, who was admitted on 4/17/2021 from home with a diagnosis of COVID-19. Current medical issues leading to Palliative Medicine involvement include: decompensating respiratory status. She was intubated on 4/23- initially did not want intubation, but did agree at the last minute after a very emotional conversation with her son Makayla Paul. Since then she has progressively declined- now in multi organ failure PALLIATIVE DIAGNOSES:  
1. Acute respiratory failure with hypoxia 2. covid-19 PNA 3. Fever 4. Cough 5. Care decisions 6. DNR DIscussion PLAN:  
1. Prior to visit I did an extensive review of the chart, spoke with nursing both her current nurse and nurses who had her last week, and the intensivist. 
2. Ms Noris Astudillo is decompensating, with increased pressor support by the hour, unable to continue her tube feeding, potassium is up to 5.2, Cr is up to 4.08 (from 2.97 yesterday), and significant sedation to help with comfort and vent synchrony. Minimal urine output, and significant issues with bleeding from her central line with a drifting H&H 3. Plan for renal ultrasound to rule out obstruction as she has a history of recurrent nephrolithiasis, as well as IV fluids to help with with FELICIA, but she is not a candidate for CRRT or dialysis due to her very poor and limited prognosis. 4. I talked to her son Makayla Paul and her friend Maria Dolores Crews who she has names as her secondary mPOA.   I shared with them my concern that she may not make it through the next 24-48 hours and I strongly recommend he come in to visit [facility] if it is important for him to see her. 5. Jorden Mckenna asked relevant questions about the decisions being made for her care- he is concerned that because she has a cancer diagnosis on her chart, that this is influencing our decision making at this stage. He was also questioning how she made, what he perceived to be a dramatic shift so quickly. I reminded him of the conversations that have been had with him through the last week and how the team talked with him and his mom about her increased mortality if she was intubated, and how this jumped to 100% once we started to see multi organ failure. 6. We talked about dialysis as another addition to her life support, and not a way to get her better at this point. Fighting to get someone better is different than fighting to keep a body alive, and putting his mom on dialysis at this point would just serve to prolong the dying processes that we are already working hard to prolong- it would not improve her chances of surviving. 7. I strongly encouraged him to protect his mom from CPR at this stage, as it will not change the course, but will be incredibly traumatic to her body. He was unable to give me an answer on this, but I hope he will call back to the CCU before she passes to make her a DNR so that she can pass in as much peace as she can at this stage. 8. Will be available for support on this journey- I am concerned that Jorden Mckenna is at risk for complicated grief 9. Initial consult note routed to primary continuity provider and/or primary health care team members 10. Communicated plan of care with: Palliative [facility] Team 
 
 GOALS OF CARE / TREATMENT PREFERENCES:  
 
GOALS OF CARE: 
Patient/Health Care Proxy Stated Goals: Prolong life TREATMENT PREFERENCES:  
Code Status: Full Code Advance Care Planning: 
[x] The [facility] Interdisciplinary Team has updated the ACP Navigator with Health Care Decision Maker and Patient Capacity Primary Decision Maker (Active): oJssue Fontaine - [phone] Secondary Decision Maker: Radha Romero - [phone] Medical Interventions: Full interventions Other: As far as possible, the palliative care team has discussed with patient / health care proxy about goals of care / treatment preferences for patient. HISTORY:  
 
History obtained from: chart CHIEF COMPLAINT: SOB 
 
HPI/SUBJECTIVE: The patient is:  
[] Verbal and participatory [] Non-participatory due to:  
 
4/17: BIBA with c/o SOB that has progressively worsened over the past 2 days, satting 50% on RA at home and 70's on NRB. Now on CPAP 14 100%. Clinical Pain Assessment (nonverbal scale for severity on nonverbal patients):  
Clinical Pain Assessment Severity: 0 Activity (Movement): Lying quietly, normal position Duration: for how long has pt been experiencing pain (e.g., 2 days, 1 month, years) Frequency: how often pain is an issue (e.g., several times per day, once every few days, constant) FUNCTIONAL ASSESSMENT:  
 
Palliative Performance Scale (PPS): PPS: 20 
 
 
 PSYCHOSOCIAL/SPIRITUAL SCREENING:  
 
Palliative IDT has assessed this patient for cultural preferences / practices and a referral made as appropriate to needs (Cultural Services, Patient Advocacy, Ethics, etc.) Any spiritual / Scientologist concerns: 
[] Yes /  [x] No 
 
Caregiver Burnout: 
[] Yes /  [x] No /  [] No Caregiver Present Anticipatory grief assessment:  
[x] Normal  / [] Maladaptive ESAS Anxiety: Anxiety: 0unable to assess due to pt factors ESAS Depression:   unable to assess due to pt factors REVIEW OF SYSTEMS:  
 
Positive and pertinent negative findings in ROS are noted above in HPI. The following systems were [x] reviewed / [] unable to be reviewed as noted in HPI Other findings are noted below. Systems: constitutional, ears/nose/mouth/throat, respiratory, gastrointestinal, genitourinary, musculoskeletal, integumentary, neurologic, psychiatric, endocrine. Positive findings noted below. Modified ESAS Completed by: provider Pain: 0 Anxiety: 0 Stool Occurrence(s): 1 PHYSICAL EXAM:  
 
From RN flowsheet: 
Wt Readings from Last 3 Encounters:  
04/26/21 253 lb 1.4 oz (114.8 kg) 04/07/21 272 lb 6.4 oz (123.6 kg) 03/31/21 273 lb (123.8 kg) Blood pressure (!) 82/49, pulse 95, temperature 98.2 °F (36.8 °C), resp. rate 28, height 5' 9\" (1.753 m), weight 253 lb 1.4 oz (114.8 kg), last menstrual period 09/07/2011, SpO2 91 %. Pain Scale 1: Behavioral Pain Scale (BPS) Pain Intensity 1: 3 Last bowel movement, if known: In order to limit the exposure risk from COVID 19 and to preserve the hospital's limited supply of PPEs, seen through ICU window. HISTORY:  
 
Active Problems: 
  COVID-19 (4/17/2021) Past Medical History:  
Diagnosis Date  Adverse effect of anesthesia   
 sleep apnea cpap machine useage  Anemia  Arthritis DDD low back and knees  Asthma   
 uses albuterol prn only; none x 6 mos. Never hospitalized  BRCA negative 1/2013  Calculus of kidney  Chronic kidney disease   
 kidney stones  Chronic pain   
 knee pain bilat  CINV (chemotherapy-induced nausea and vomiting) 8/21/2014  Decreased hearing, right PATIENT STATES THIS IS DUE TO CHEMOTHERAPY  Diabetes ([address] Age 46 IDDM  Environmental allergies  Fibromyalgia  GERD (gastroesophageal reflux disease)   
 controlled with med  Hx of pulmonary embolus during pregnancy 1/18/2015  Hydronephrosis due to obstruction of ureter 3/18/2019  Hypertension  Ill-defined condition Sepsis 9-2015 -  SOURCE PORT  
 Ill-defined condition 2016  
 chemotherapy  Mass of chest wall 05/2018  Morbid obesity ([address]  Murmur, heart  Other and unspecified hyperlipidemia  Ovarian cancer ([facility] 2012, 2014  
 serous, high grade, stage IIIc. s/p chemotx (has port)  Psychiatric disorder Depression/Anxiety  Rectal bleeding  Thromboembolus ([facility] [address] [facility]  POST PARTUM; resolved- PELVIS  Thyroid disease HYPOTHYROID  Unspecified sleep apnea CPAP, Dr. Buzz Quintana Past Surgical History:  
Procedure Laterality Date [place]  
  X2  
 HX HYSTERECTOMY  2012 ROULA/BSO, tumor debulking, omentectomy for ovarian cancer  HX ORTHOPAEDIC Right   
 ankle-FX, R  
 HX ORTHOPAEDIC Right   
 FX R ARM  
 HX UROLOGICAL Right  STENT FOR KIDNEY STONES  
 HX VASCULAR ACCESS  2015  
 right chest- port placed  HX VASCULAR ACCESS Left  TETE CATH  
 DE BREAST SURGERY PROCEDURE UNLISTED Lymph node in left breast 2017 Family History Problem Relation Age of Onset  Hypertension Mother [facility] Arthritis-osteo Mother  Hypertension Father  Arthritis-osteo Father  Cancer Father PROSTATE  COPD Father  Hypertension Brother  Elevated Lipids Brother  Arthritis-osteo Brother  Hypertension Brother  Elevated Lipids Brother  Obesity Brother  Cancer Brother PROSTATE  Anesth Problems Son DELAYED AWAKENING  
 Diabetes Maternal Grandmother  Anesth Problems Paternal Grandmother PATIENT STATES GRANDMOTHER'S HEART STOPPED DURING SURGERY History reviewed, no pertinent family history. Social History Tobacco Use  Smoking status: Never Smoker  Smokeless tobacco: Never Used Substance Use Topics  Alcohol use: Yes Comment: 1 drink per month Allergies Allergen Reactions  Latex Hives  Carboplatin Other (comments) Patient developed shortness of breath, felt faint, coughing, skin flushed and \"itchy\". This occurred on the patients 8th treatment.   
 Iodinated Contrast Media Rash 13 hr pre-medication prior to IV contrast.  
Patient has done well with 1 hr pre-medication of (Solu-Medrol) 40mg &  (Benadryl) 50mg.  Lipitor [Atorvastatin] Myalgia  Shellfish Containing Products Hives  Tape [Adhesive] Itching and Other (comments) \"op site-- clear,thin tape\"--caused blister  Tomato Hives  Iodine And Iodide Containing Products Rash  Olmesartan Other (comments)  Losartan Other (comments)  
  headaches  Percocet [Oxycodone-Acetaminophen] Other (comments) Fever; however, current home med Current Facility-Administered Medications Medication Dose Route Frequency  0.9% sodium chloride infusion 250 mL  250 mL IntraVENous PRN  
 vasopressin (VASOSTRICT) 20 Units in 0.9% sodium chloride 100 mL infusion  0-0.04 Units/min IntraVENous TITRATE  sodium zirconium cyclosilicate (LOKELMA) powder packet 5 g  5 g Oral NOW  
 0.45% sodium chloride infusion  100 mL/hr IntraVENous CONTINUOUS  
 epoprostenol (VELETRI) 30 mcg/mL in 0.9% sodium chloride 50 mL inhalation solution  30 ng/kg/min (Ideal) Inhalation CONTINUOUS  
 insulin regular (NOVOLIN R, HUMULIN R) 100 Units in 0.9% sodium chloride 100 mL infusion  0-50 Units/hr IntraVENous TITRATE  dextrose (D50W) injection syrg 12.5-25 g  25-50 mL IntraVENous PRN  chlorhexidine (ORAL CARE KIT) 0.12 % mouthwash 15 mL  15 mL Oral Q12H  
 docusate (COLACE) 50 mg/5 mL oral liquid 200 mg  200 mg Per NG tube BID  magnesium hydroxide (MILK OF MAGNESIA) 400 mg/5 mL oral suspension 30 mL  30 mL Per NG tube DAILY PRN  
 bisacodyL (DULCOLAX) suppository 10 mg  10 mg Rectal DAILY PRN  
 bisacodyL (DULCOLAX) tablet 5 mg  5 mg Oral DAILY PRN  
 enoxaparin (LOVENOX) injection 120 mg  1 mg/kg SubCUTAneous Q24H  
 famotidine (PF) (PEPCID) 20 mg in 0.9% sodium chloride 10 mL injection  20 mg IntraVENous Q24H  prochlorperazine (COMPAZINE) with saline injection 5 mg  5 mg IntraVENous Q3H PRN  
 fat emulsion 20% (LIPOSYN, INTRAlipid) infusion 250 mL  250 mL IntraVENous Q MON, WED & FRI  propofol (DIPRIVAN) 10 mg/mL infusion  0-50 mcg/kg/min IntraVENous TITRATE  
 NOREPINephrine (LEVOPHED) 8 mg in 5% dextrose 250mL (32 mcg/mL) infusion  0.5-16 mcg/min IntraVENous TITRATE  fentaNYL (PF) 1,500 mcg/30 mL (50 mcg/mL) infusion  0-300 mcg/hr IntraVENous TITRATE  midazolam (VERSED) 100 mg in 0.9% sodium chloride 100 mL infusion  0-10 mg/hr IntraVENous TITRATE  cisatracurium (NIMBEX) 200 mg in 0.9% sodium chloride 100 mL (2 mg/mL) infusion  0-10 mcg/kg/min IntraVENous TITRATE  methylPREDNISolone (PF) (Solu-MEDROL) injection 60 mg  60 mg IntraVENous Q12H  hydrALAZINE (APRESOLINE) 20 mg/mL injection 20 mg  20 mg IntraVENous Q4H PRN  
 arformoteroL (BROVANA) neb solution 15 mcg  15 mcg Nebulization BID RT And  
 budesonide (PULMICORT) 250 mcg/2ml nebulizer susp  500 mcg Nebulization BID RT  
 sodium chloride (NS) flush 5-10 mL  5-10 mL IntraVENous PRN  
 ascorbic acid (vitamin C) (VITAMIN C) tablet 500 mg  500 mg Oral DAILY  sodium chloride (NS) flush 5-40 mL  5-40 mL IntraVENous Q8H  
 acetaminophen (TYLENOL) tablet 650 mg  650 mg Oral Q6H PRN Or  
 acetaminophen (TYLENOL) suppository 650 mg  650 mg Rectal Q6H PRN  polyethylene glycol (MIRALAX) packet 17 g  17 g Oral DAILY PRN  
 ondansetron (ZOFRAN) injection 4 mg  4 mg IntraVENous Q6H PRN  
 levothyroxine (SYNTHROID) tablet 150 mcg  150 mcg Oral 6am  
 sertraline (ZOLOFT) tablet 50 mg  50 mg Oral DAILY  glucose chewable tablet 16 g  4 Tab Oral PRN  
 glucagon (GLUCAGEN) injection 1 mg  1 mg IntraMUSCular PRN  
 alcohol 62% (NOZIN) nasal  1 Ampule  1 Ampule Topical Q12H  
 
 
 
 LAB AND IMAGING FINDINGS:  
 
Lab Results Component Value Date/Time WBC 28.4 (H) 04/26/2021 01:06 AM  
 HGB 6.5 (L) 04/26/2021 01:06 AM  
 PLATELET 301 11/80/5567 01:06 AM  
 
Lab Results Component Value Date/Time  Sodium 138 04/26/2021 01:06 AM Potassium 5.2 (H) 04/26/2021 01:06 AM  
 Chloride 106 04/26/2021 01:06 AM  
 CO2 21 04/26/2021 01:06 AM  
 BUN 76 (H) 04/26/2021 01:06 AM  
 Creatinine 4.08 (H) 04/26/2021 01:06 AM  
 Calcium 7.1 (L) 04/26/2021 01:06 AM  
 Magnesium 2.1 04/26/2021 01:06 AM  
 Phosphorus 9.0 (H) 04/26/2021 01:06 AM  
  
Lab Results Component Value Date/Time Alk. phosphatase 102 04/26/2021 01:06 AM  
 Protein, total 5.8 (L) 04/26/2021 01:06 AM  
 Albumin 1.9 (L) 04/26/2021 01:06 AM  
 Globulin 3.9 04/26/2021 01:06 AM  
 
Lab Results Component Value Date/Time INR 1.1 04/03/2017 10:25 AM  
 Prothrombin time 11.1 04/03/2017 10:25 AM  
 aPTT 23.3 01/16/2015 12:15 PM  
  
Lab Results Component Value Date/Time Iron 65 01/27/2021 01:14 PM  
 TIBC 293 01/27/2021 01:14 PM  
 Iron % saturation 22 01/27/2021 01:14 PM  
 Ferritin 2,678 (H) 04/26/2021 01:06 AM  
  
Lab Results Component Value Date/Time pH 7.21 (LL) 04/25/2021 04:02 AM  
 PCO2 54 (H) 04/25/2021 04:02 AM  
 PO2 106 (H) 04/25/2021 04:02 AM  
 
No components found for: [place] Lab Results Component Value Date/Time CK - MB 1.2 04/10/2018 07:33 PM  
  
 
 
   
 
Total time: 45  
Counseling / coordination time, spent as noted above: 30 
> 50% counseling / coordination?: y 
 
Prolonged service was provided for  []30 min   []75 min in face to face time in the presence of the patient, spent as noted above. Time Start:  
Time End:  
Note: this can only be billed with 06272 (initial) or 70657 (follow up). If multiple start / stop times, list each separately.

## 2021-04-26 NOTE — PROGRESS NOTES
Kyara Carney Hold feeds 
 
reconsult palliative. [facility] Name: Arelis Mobley : 1960 MRN: [id number] Date: 2021 Assessment:  
Brief patient summary: HPI: Sylvester Mireles a 62 y.o. female who has a PMH of ovarian cancer s/p recent chemotherapy, CKD, urinary retention, GERD, asthma, chronic pain, anemia, depression and anxiety who presented to the ED via EMS with progressively worsening SOB x2 days. She denied fever, chills, N/V/D chest pain, or other constitutional symptoms. Oxygen sats were reported to be 50% on RA at home. [facility] was placed on NRB by EMS and had oxygen sats in the 70s upon arrival to the ED. ABG on NRB was 7.49//. She was transitioned to CPAP 14 100%. She was treated with empiric antibiotics.  She was found to be COVID positive. ICU was consulted and she was accepted to the ICU service.  
  
Note: + contrast allergy 
  
 Events noted. See extensive o/n documentation. Now intubated. Levo started as well. UOP dropped significantly, bordering on anuric.  No major o/n events noted. Essentially anuric at this point. Some bleeding around CVC site, may be controlled. H/H drifting. Levo remains, coming down. Pam TFs. Remains paralyzed.   Urine output remains poor. Nursing reports significant bleeding at central line site. Presser increased Vent day: 
4 Active Problems: (By systems) PRINCIPLE DIAGNOSIS: 
1. Acute hypoxic respiratory failure due to covid PNA PULMONARY: 
2. ARDS - lung protective ventilation and ARDSnet protocol. Will plan to wean paralytic today then veletri if possible. Continue bronchodilators and pulm hygeine 3. covid PNA - not a remdesivir candidate due to FELICIA. Was previously treated for CAP. Completed actemra. Continue solumedrol 4. Pneumomediastinum - small. Monitor daily cxr. No indication for intervention at this time CARDIOVASCULAR/HEMODYNAMIC: 
5. Shock - secondary to sepsis.   Wean NE.  
 
 Current vasopressors: 
NE 
 
RENAL: 
6. FELICIA - agree with nephrology that she is not a longterm dialysis candidate due to her com-morbid conditions. Concentrate IVF as possible given her oligouria. Renal ultrasound to rule out obstruction with h/o recurrent nephrolithiasis. 7. Hyperkalemia - Ирина Speak today for short term. Current RRT:  
none GI/NUTRITION: 
8. Hold feeds due to vasopresser use and risk of bowel ischmia Current nutritional support INFECTIOUS DISEASE 9. Covid - see above 10. UTI Micro: 
Urine MDR e faecalis Antibiotics: 
 vanc day 7/7 HEME 
11. H/o dvt - convert lovenox to heparin due to kidney function 12. Anemia - icu associated blood loss. Transfuse 1 unit today. DVT prophylaxis: 
Heparin NEURO 13. Sedation for vent synchrony 14. H/o depression - home zoloft OTHER 
15. Hypothyroid - continue synthroid 16. H/o ovarian cancer - previously on palliative chemo - held due to acute illness GOALS OF CARE/ADVANCED DIRECTIVES 17. CODE STATUS: Agree with nephrology and previous critical care team that her mortality is essentially 100%. Will consult palliative care and attempt to set up in person family meeting to further discuss goals of care. At this point, CPR would be medically futile.   
 
 
 
 
Past Medical History:  
 
 has a past medical history of Adverse effect of anesthesia, Anemia, Arthritis, Asthma, BRCA negative (1/2013), Calculus of kidney, Chronic kidney disease, Chronic pain, CINV (chemotherapy-induced nausea and vomiting) (8/21/2014), Decreased hearing, right, Diabetes ([address] (Age 46), Environmental allergies, Fibromyalgia, GERD (gastroesophageal reflux disease), pulmonary embolus during pregnancy (1/18/2015), Hydronephrosis due to obstruction of ureter (3/18/2019), Hypertension, Ill-defined condition, Ill-defined condition (2016), Mass of chest wall (05/2018), Morbid obesity ([address] Murmur, heart, Other and unspecified hyperlipidemia, Ovarian cancer [facility]) (9/2012, 1/2014), Psychiatric disorder, Rectal bleeding, Thromboembolus ([facility] [address] (1993), Thyroid disease, and Unspecified sleep apnea. Past Surgical History:  
 
 has a past surgical history that includes pr breast surgery procedure unlisted; hx gyn (1990 1993); hx hysterectomy (9/2012); hx vascular access (11/4/2015); hx vascular access (Left, 2017); hx urological (Right, 2017); hx orthopaedic (Right, 1990); and hx orthopaedic (Right, 1980'S). Home Medications:  
 
Prior to Admission medications Medication Sig Start Date End Date Taking? Authorizing Provider  
albuterol (PROVENTIL HFA, VENTOLIN HFA, PROAIR HFA) 90 mcg/actuation inhaler Take 2 Puffs by inhalation every four (4) hours as needed for Wheezing. Yes Provider, Historical  
furosemide (Lasix) 20 mg tablet Take 20 mg by mouth two (2) times a day. Yes Provider, Historical  
Eliquis 5 mg tablet TAKE 1 TAB BY MOUTH TWO (2) TIMES A DAY FOR 30 DAYS. 3/1/21  Yes Светлана Madden PA-C  
omega-3 acid ethyl esters (LOVAZA) 1 gram capsule TAKE 1 CAPSULE BY MOUTH TWICE A DAY 10/6/20  Yes Jeff Gray MD  
rosuvastatin (CRESTOR) 10 mg tablet Take 10 mg by mouth nightly. Yes Provider, Historical  
doxazosin (CARDURA) 1 mg tablet Take 1 mg by mouth daily. Yes Provider, Historical  
LEVEMIR FLEXTOUCH U-100 INSULN 100 unit/mL (3 mL) inpn INJECT 30 UNITS IN AM AND 50 UNITS AT NIGHT. INCREASE AS DIRECTED TO  UNITS/DAY. 8/15/19  Yes Jeff Gray MD  
levothyroxine (SYNTHROID) 150 mcg tablet TAKE 1 TABLET BY MOUTH EVERY DAY BEFORE BREAKFAST 4/2/19  Yes Daniel Burgos MD  
albuterol (PROVENTIL VENTOLIN) 2.5 mg /3 mL (0.083 %) nebulizer solution 2.5 mg by Nebulization route every four (4) hours as needed for Wheezing. Yes Provider, Historical  
SYMBICORT 160-4.5 mcg/actuation HFAA Take 2 Puffs by inhalation two (2) times daily as needed.  12/26/17  Yes Nelly Haskins MD  
gabapentin (NEURONTIN) 100 mg capsule Take 2 Caps by mouth nightly. Max Daily Amount: 200 mg. Indications: neuropathic pain 2/10/21   Dax Arana PA-C  
dexAMETHasone (DECADRON) 4 mg tablet TAKE 2 TABLETS WITH BREAKFAST THE DAY BEFORE CHEMO AND FOR 2 DAYS AFTER CHEMO 1/28/21   Esteban Lowery MD  
lidocaine-prilocaine (EMLA) topical cream Apply small amount over port area and cover with band aid one hour before chemo 6/30/20   Esteban Lowery MD  
ondansetron (ZOFRAN ODT) 4 mg disintegrating tablet Take 1 Tab by mouth every eight (8) hours as needed for Nausea. Indications: nausea and vomiting caused by cancer drugs 6/30/20   Esteban Lowery MD  
EPINEPHrine (EPIPEN) 0.3 mg/0.3 mL injection INJECT 0.3 ML BY INTRAMUSCULAR ROUTE ONCE AS NEEDED FOR UP TO 1 DOSE. 1/16/19   Provider, Historical  
azelastine (ASTELIN) 137 mcg (0.1 %) nasal spray 1 [place] by Both Nostrils route daily as needed. Use in each nostril as directed     Provider, Historical  
ascorbic acid, vitamin C, (VITAMIN C) 1,000 mg tablet Take 1,000 mg by mouth daily. Provider, Historical  
cholecalciferol, VITAMIN D3, (VITAMIN D3) 5,000 unit tab tablet Take 5,000 Units by mouth daily. Provider, Historical  
 
 
Allergies/Social/Family History: Allergies Allergen Reactions  Latex Hives  Carboplatin Other (comments) Patient developed shortness of breath, felt faint, coughing, skin flushed and \"itchy\". This occurred on the patients 8th treatment.  Iodinated Contrast Media Rash 13 hr pre-medication prior to IV contrast.  
Patient has done well with 1 hr pre-medication of (Solu-Medrol) 40mg &  (Benadryl) 50mg.  Lipitor [Atorvastatin] Myalgia  Shellfish Containing Products Hives  Tape [Adhesive] Itching and Other (comments) \"op site-- clear,thin tape\"--caused blister  Tomato Hives  Iodine And Iodide Containing Products Rash  Olmesartan Other (comments)  Losartan Other (comments)  
  headaches  Percocet [Oxycodone-Acetaminophen] Other (comments) Fever; however, current home med Social History Tobacco Use  Smoking status: Never Smoker  Smokeless tobacco: Never Used Substance Use Topics  Alcohol use: Yes Comment: 1 drink per month Family History Problem Relation Age of Onset  Hypertension Mother Verito Trinidad Arthritis-osteo Mother  Hypertension Father  Arthritis-osteo Father  Cancer Father PROSTATE  COPD Father  Hypertension Brother  Elevated Lipids Brother  Arthritis-osteo Brother  Hypertension Brother  Elevated Lipids Brother  Obesity Brother  Cancer Brother PROSTATE  Anesth Problems Son DELAYED AWAKENING  
 Diabetes Maternal Grandmother  Anesth Problems Paternal Grandmother PATIENT STATES GRANDMOTHER'S HEART STOPPED DURING SURGERY Objective:  
Vital Signs: 
Visit Vitals BP (!) 82/49 Pulse 95 Temp 98.2 °F (36.8 °C) Resp 28 Ht 5' 9\" (1.753 m) Wt 114.8 kg (253 lb 1.4 oz) LMP 2011 SpO2 91% BMI 37.37 kg/m² O2 Flow Rate (L/min): 40 l/min O2 Device: Ventilator Temp (24hrs), Av.5 °F (36.4 °C), Min:96 °F (35.6 °C), Max:98.3 °F (36.8 °C) Intake/Output:  
 
Intake/Output Summary (Last 24 hours) at 2021 1344 Last data filed at 2021 3048 Gross per 24 hour Intake 2394.29 ml Output 20 ml Net 2374.29 ml Physical Exam  
Intubated sedated paralyzed Coarse bilateral BS Mild tachy Soft NT ND +BS I have examined the patient on this day 2021 and the above documented exam is accurate including the components that have been copied forward LABS AND  DATA: Personally reviewed Recent Labs  
  21 
0231 WBC 28.4* 19.4* HGB 6.5* 7.0*  
HCT 20.7* 22.7*  
 174 Recent Labs  
  21 
0231  142  
K 5.2* 5.0  
 110* CO2 21 23 BUN 76* 62* CREA 4.08* 2.97* * 163* CA 7.1* 8.0*  
MG 2.1 2.3 PHOS 9.0* 8.0*  
 Recent Labs  
  04/26/21 
0106 04/25/21 
0231  78  
TP 5.8* 6.0* ALB 1.9* 1.8*  
GLOB 3.9 4.2* No results for input(s): INR, PTP, APTT, INREXT, INREXT in the last 72 hours. No results for input(s): PHI, PCO2I, PO2I, FIO2I in the last 72 hours. No results for input(s): CPK, CKMB, TROIQ, BNPP in the last 72 hours. Hemodynamics:  
PAP:   CO:    
Wedge:   CI:    
CVP:    SVR:    
  PVR:    
 
Ventilator Settings: 
Mode Rate Tidal Volume Pressure FiO2 PEEP PRVC   400 ml    100 % 15 cm H20 Peak airway pressure: 33 cm H2O Minute ventilation: 10.8 l/min MEDS: Reviewed Chest X-Ray: CXR Results  (Last 48 hours) 04/26/21 0425  XR CHEST PORT Final result Impression:  No significant interval change Narrative:  Clinical history: f/u PTX/Pneumomediastinum, COVID+ INDICATION:   f/u PTX/Pneumomediastinum, COVID+ COMPARISON: 4/25/2021 FINDINGS:  
AP portable upright view of the chest demonstrates a stable  cardiopericardial  
silhouette. Central venous access catheter on the right is stable. ET tube and NG  
tube are in appropriate position. .Interstitial and parenchymal opacities  
bilaterally not significantly changed. .No new pneumothorax or significant  
subcutaneous emphysema. Port catheter on the left. . Patient is on a cardiac monitor. 04/25/21 0452  XR CHEST PORT Final result Impression: No significant change in mixed bilateral alveolar and interstitial disease. Improvement in subcutaneous emphysema. Narrative:  EXAM:  XR CHEST PORT INDICATION:  f/u PTX/Pneumomediastinum, COVID+ COMPARISON:  4/24/2021 FINDINGS:  
   
A portable AP radiograph of the chest was obtained at 4:42 hours. The tip of the  
right jugular catheter is in the region of the atriocaval junction. The ET tube  
is in satisfactory position.  The distal portion of the enteric tube which can be  
seen overlies the gastroesophageal junction. There has been no significant change in the bilateral mixed alveolar and  
interstitial opacities, with a basilar predominance. The cardiac silhouette  
remains unchanged. The tip of the infusion catheter is in the region of the  
atriocaval junction. There has been a decrease in subcutaneous emphysema in the  
base of the neck. Multidisciplinary Rounds Completed:  Yes SPECIAL EQUIPMENT None DISPOSITION Stay in ICU CRITICAL CARE CONSULTANT NOTE I had a in-person encounter with Dia Bunn, reviewed and interpreted patient data including events, labs, images, vital signs, I/O's, and examined patient. I have discussed the case and the plan and management of the patient's care with the consulting services, the bedside nurses and the respiratory therapist.   
 
NOTE OF PERSONAL INVOLVEMENT IN CARE This patient is at high risk for sudden and clinically significant deterioration, which requires the highest level of preparedness to intervene urgently. I participated in the decision-making and personally managed or directed the management of the following life and organ supporting interventions that required my frequent assessment to treat or prevent imminent deterioration. I personally spent 65 minutes of critical care time. This is time spent at patient's bedside actively involved in patient care as well as the coordination of care and discussions with the patient's family. This does not include any procedural time which has been billed separately. Brittni Francisco MD 
Pulmonary/CCM [phone] 
[phone] 
4/26/2021

## 2021-04-26 NOTE — INTERDISCIPLINARY ROUNDS
Interdisciplinary team rounds were held 4/26/2021 with the following team members:Care Management, Diabetes Treatment Specialist, Nursing, Nutrition, Pharmacy, Physical Therapy and Physician. Plan of care discussed. See clinical pathway and/or care plan for interventions and desired outcomes.

## 2021-04-27 PROBLEM — Z71.89 GOALS OF CARE, COUNSELING/DISCUSSION: Status: ACTIVE | Noted: 2021-01-01

## 2021-04-27 NOTE — PROGRESS NOTES
I have reviewed the extensive medical records including prior notes, lab data and Xrays. The major developments in the past 24-48 hrs are development of recurrent severe sepsis with worsening shock and anuric FELICIA. She is not a candidate for CRRT due to very poor prognosis for survival.    I have spoken with his listed contacts - Carmita Cortes (friend) and Sanju Tripp (son) and provided them an update of her deteriorating condition. Soledad Gamble accepts that she will not survive regardless of the aggressiveness of our interventions. He accepts DNR in event of cardiac arrest (which I have indicated could be anytime). I have explained that we will keep our current support going but focus primarily on her comfort as our highest priority. I offered him a chance to come in to see her but given the COVID restrictions, he has declined. I assured him that we will keep him notified of changes and offered my condolences.     Yanick Rivera, [address] [facility]  [phone]  4/27/2021 4:35 PM

## 2021-04-27 NOTE — PROGRESS NOTES
Palliative Medicine  [place]: [phone] ([phone])  [facility]: [phone] ([phone])    Phone message left for son Leandro March, to introduce self as Palliative SW, available for support if needed, left VM/ phone number. Will await call back.

## 2021-04-27 NOTE — PROGRESS NOTES
Comprehensive Nutrition Assessment    Type and Reason for Visit: Reassess    Nutrition Recommendations/Plan:   Continue NPO 2' high pressor needs and STEPHANIE risk     Nutrition Assessment:   Chart reviewed, case discussed during CCU rounds. Pt intubated over the weekend, needs re-estimated. TF was started but due to increasing pressor needs was held yesterday 2' STEPHANIE risk. NGT now to suction. Levo at 45 and vaso at 0.04 with MAP's in the 50-60's. Pt is MSOF. Not an RRT candidate per Nephrology, K 6.4 and phos 10.7. Palliative is following and trying to work with family on [address] and limits. Pt is not safe for enteral feeds at this time. Given ARF would not recommend starting TPN either. Will monitor plan of care. Estimated Daily Nutrient Needs:  Energy (kcal): PSU 2050 (MSJ 9267); Weight Used for Energy Requirements: Current  Protein (g): 99-132g (1.5-2gPro/kg IBW); Weight Used for Protein Requirements: Ideal  Fluid (ml/day): 1800mL or per MD; Method Used for Fluid Requirements: Standard renal      Nutrition Related Findings:  Meds: lokelma, zosyn, solumedrol, pepcid; Drips: levo, vaso, versed, fentanyl, insulin. Edema: +2 pitting-all extremities. BM 4/26      Wounds:    None       Current Nutrition Therapies:  No diet orders on file    Anthropometric Measures:  · Height:  5' 9\" (175.3 cm)  · Current Body Wt:  114.2 kg (251 lb 12.3 oz)   · Ideal Body Wt:  145 lbs:  170.7 %   · BMI Category:  Obese class 2 (BMI 35.0-39. 9)       Nutrition Diagnosis:   · Inadequate protein-energy intake related to catabolic illness, impaired respiratory function as evidenced by NPO or clear liquid status due to medical condition  Previous dx resumed.      Nutrition Interventions:   Food and/or Nutrient Delivery: Continue NPO  Nutrition Education and Counseling: No recommendations at this time  Coordination of Nutrition Care: Continue to monitor while inpatient, Interdisciplinary rounds    Goals:  Plan of care will be determined re: nutrition vs comfort care in 1-3 days. Nutrition Monitoring and Evaluation:   Behavioral-Environmental Outcomes: None identified  Food/Nutrient Intake Outcomes: IVF intake  Physical Signs/Symptoms Outcomes: Biochemical data, Nutrition focused physical findings, Skin, Weight, GI status, Fluid status or edema, Hemodynamic status    Discharge Planning:     Too soon to determine     Electronically signed by Filipe Hatch RD, [address]  on 4/27/2021 at 2:12 PM    Contact: [phone]

## 2021-04-27 NOTE — PROGRESS NOTES
04/27/21 9726 ABCDEF Bundle SBT Safety Screen Passed No  
SBT Screen Reason for Failure FiO2 > 50%;PEEP > 7. 5;Vasopressor use (Veletri inline)

## 2021-04-27 NOTE — PROGRESS NOTES
[facility] Progress Note        NAME: Vonnie Lee       :  1960       MRN:  [id number]     Date/Time: 2021    Risk of deterioration: high       Assessment:    Plan:  FELICIA/Shock-Anuric/hyperkalemia  COVID (+)  Ovarian cancer  Anemia  Hx of hydro/stent/retention-mild hydro on most recent us  Resp failure  MSOF   See dr. Golden Pean note from -  Pt on 100% Fi02 with 15 PEEP  Off paralytics now-no cough response per nursing  Renal function continues to deteriorate  k treated medically  Not a crrt candidate given her grim prognosis  Appreciate palliative care's help in this case. Leukocytosis worsening  Platelets worse             Subjective:     Chief Complaint:  Unable to give    Review of Systems: Patient was not able to provide review of systems due to mental status change/acute illness    Objective:     VITALS:   Last 24hrs VS reviewed since prior progress note.  Most recent are:  Visit Vitals  BP (!) 90/48   Pulse 93   Temp 98.4 °F (36.9 °C)   Resp 28   Ht 5' 9\" (1.753 m)   Wt 114.8 kg (253 lb 1.4 oz)   SpO2 91%   BMI 37.37 kg/m²     SpO2 Readings from Last 6 Encounters:   21 91%   21 99%   21 96%   12/10/20 98%   20 98%   20 98%    O2 Flow Rate (L/min): 40 l/min       Intake/Output Summary (Last 24 hours) at 2021 1141  Last data filed at 2021 0630  Gross per 24 hour   Intake 2943.23 ml   Output 40 ml   Net 2903.23 ml        Telemetry Reviewed      PHYSICAL EXAM: Seen thru glass icu door-limiting PPE in setting of active covid    General   well developed, well nourished, appears stated age, in no acute distress and intubated/sedated  mcclain            Lab Data Reviewed: (see below)    Medications Reviewed: (see below)    PMH/SH reviewed - no change compared to H&P  _________________________________________  ___________________________________________________    Attending Physician: Tammy Palmer MD ____________________________________________________  MEDICATIONS:  Current Facility-Administered Medications   Medication Dose Route Frequency    piperacillin-tazobactam (ZOSYN) 3.375 g in 0.9% sodium chloride (MBP/ADV) 100 mL MBP  3.375 g IntraVENous Q12H    dextrose (D50W) injection syrg 12.5-25 g  12.5-25 g IntraVENous PRN    sodium zirconium cyclosilicate (LOKELMA) powder packet 10 g  10 g Oral NOW    [START ON 4/28/2021] levothyroxine (SYNTHROID) tablet 150 mcg  150 mcg Per NG tube 6am    acetaminophen (TYLENOL) tablet 650 mg  650 mg Per NG tube Q6H PRN    Or    acetaminophen (TYLENOL) suppository 650 mg  650 mg Rectal Q6H PRN    0.9% sodium chloride infusion 250 mL  250 mL IntraVENous PRN    vasopressin (VASOSTRICT) 20 Units in 0.9% sodium chloride 100 mL infusion  0-0.04 Units/min IntraVENous TITRATE    heparin 25,000 units in D5W 250 ml infusion  8.7-25 Units/kg/hr IntraVENous TITRATE    heparin (porcine) injection 4,000 Units  4,000 Units IntraVENous PRN    heparin (porcine) injection 2,000 Units  2,000 Units IntraVENous PRN    NOREPINephrine (LEVOPHED) 32 mg in 5% dextrose 250 mL infusion  0.5-40 mcg/min IntraVENous TITRATE    epoprostenol (VELETRI) 30 mcg/mL in 0.9% sodium chloride 50 mL inhalation solution  30 ng/kg/min (Ideal) Inhalation CONTINUOUS    insulin regular (NOVOLIN R, HUMULIN R) 100 Units in 0.9% sodium chloride 100 mL infusion  0-50 Units/hr IntraVENous TITRATE    dextrose (D50W) injection syrg 12.5-25 g  25-50 mL IntraVENous PRN    chlorhexidine (ORAL CARE KIT) 0.12 % mouthwash 15 mL  15 mL Oral Q12H    bisacodyL (DULCOLAX) suppository 10 mg  10 mg Rectal DAILY PRN    bisacodyL (DULCOLAX) tablet 5 mg  5 mg Oral DAILY PRN    famotidine (PF) (PEPCID) 20 mg in 0.9% sodium chloride 10 mL injection  20 mg IntraVENous Q24H    propofol (DIPRIVAN) 10 mg/mL infusion  0-50 mcg/kg/min IntraVENous TITRATE    fentaNYL (PF) 1,500 mcg/30 mL (50 mcg/mL) infusion  0-300 mcg/hr IntraVENous TITRATE    methylPREDNISolone (PF) (Solu-MEDROL) injection 60 mg  60 mg IntraVENous Q12H    arformoteroL (BROVANA) neb solution 15 mcg  15 mcg Nebulization BID RT    And    budesonide (PULMICORT) 250 mcg/2ml nebulizer susp  500 mcg Nebulization BID RT    sodium chloride (NS) flush 5-10 mL  5-10 mL IntraVENous PRN    sodium chloride (NS) flush 5-40 mL  5-40 mL IntraVENous Q8H    ondansetron (ZOFRAN) injection 4 mg  4 mg IntraVENous Q6H PRN    alcohol 62% (NOZIN) nasal  1 Ampule  1 Ampule Topical Q12H        LABS:  Recent Labs     04/27/21 0330 04/26/21 2054   WBC 56.4* 50.4*   HGB 7.7* 8.8*   HCT 23.6* 27.2*   * 113*     Recent Labs     04/27/21 0330 04/26/21 0106 04/25/21  0231   * 138 142   K 6.4* 5.2* 5.0    106 110*   CO2 19* 21 23   BUN 89* 76* 62*   CREA 5.30* 4.08* 2.97*   * 183* 163*   CA 7.0* 7.1* 8.0*   MG 2.2 2.1 2.3   PHOS 10.7* 9.0* 8.0*     Recent Labs     04/27/21 0330 04/26/21 0106 04/25/21  0231   ALT 38 39 24    102 78   TBILI 0.8 0.7 0.5   TP 5.5* 5.8* 6.0*   ALB 1.9* 1.9* 1.8*   GLOB 3.6 3.9 4.2*     Recent Labs     04/27/21 0330 04/26/21 2054   APTT 50.9* 28.1      Recent Labs     04/26/21 0106 04/25/21  0231   FERR 2,678* 2,888*      Recent Labs     04/25/21  0402   PH 7.21*   PCO2 54*   PO2 106*     No results for input(s): CPK, CKNDX, TROIQ in the last 72 hours.     No lab exists for component: CPKMB  Lab Results   Component Value Date/Time    Glucose (POC) 148 (H) 04/27/2021 11:10 AM    Glucose (POC) 169 (H) 04/27/2021 09:05 AM    Glucose (POC) 191 (H) 04/27/2021 07:43 AM    Glucose (POC) 175 (H) 04/27/2021 05:10 AM    Glucose (POC) 164 (H) 04/27/2021 04:17 AM

## 2021-04-27 NOTE — PROGRESS NOTES
Palliative Medicine Consult Maurice: [phone]) Patient Name: Boo Bneítez YOB: 1960 Date of Initial Consult: 4/19/21 Reason for Consult: care decisions Requesting Provider: Susan Swain MD  
Primary Care Physician: Koki Dow MD 
 
 SUMMARY:  
Boo Benítez is a 62 y.o. with a past history of metastatic ovarian cancer on palliative (not curative) chemotherapy, retention, GERD, asthma, chronic pain, anemia diabetes, chronic kidney disease, depression and anxiety, who was admitted on 4/17/2021 from home with a diagnosis of COVID-19. Current medical issues leading to Palliative Medicine involvement include: decompensating respiratory status. She was intubated on 4/23- initially did not want intubation, but did agree at the last minute after a very emotional conversation with her son Jessica Arnold. Since then she has progressively declined- now in multi organ failure 4/27: intubated, no cough or gag. Creatinine up to 5.30. Pt has an AMD on file that states \"if at any time my attending physician should determine that I have a terminal condition where the application of life-prolonging procedures would serve only to artificially prolong the dying process, I direct that such procedures be withheld or withdrawn, and that I be permitted to die naturally. Carmen Heman Carmen Heman \" (see AMD for complete document). PALLIATIVE DIAGNOSES:  
1. Acute respiratory failure with hypoxia 2. covid-19 PNA 3. Fever 4. Cough 5. Care decisions 6. Debility 7. DNR DIscussion PLAN:  
1. Prior to visit I did an extensive review of the chart, spoke with her nurse Kenia Roe, and the intensivist Girish Jang. 2. Unable to get in touch with son by phone. 3. 25 min:  Spoke with Josee Lerner, who is listed on pt's AMD as secondary POA, she called me asking for an update.   I updated her on pt's overall poor condition including worsening renal failure and shock, and that she is not a candidate for dialsis, and grim prognosis, that if she and son Eugene Choi wanted to see pt before she dies they should come today. I also counseled her regarding patient's AMD and her wishes stated on that document. Deborah Euceda stated that she would call Eugene Choi and get back to me. 4. I updated  on above conversation in the event that son calls him. 5. Will be available for support on this journey- I am concerned that Eugene Choi is at risk for complicated grief 6. Initial consult note routed to primary continuity provider and/or primary health care team members 7. Communicated plan of care with: Palliative IDT, [address] Team 
 
 GOALS OF CARE / TREATMENT PREFERENCES:  
 
GOALS OF CARE: 
Patient/Health Care Proxy Stated Goals: Prolong life TREATMENT PREFERENCES:  
Code Status: DNR Advance Care Planning: 
[x] The [facility] Interdisciplinary Team has updated the ACP Navigator with [place] and Patient Capacity Primary Decision Maker (Active): Emmanuel Dominguez - Zhen - [phone] Secondary Decision Maker: China Hansen - [phone] Medical Interventions: Full interventions Other: As far as possible, the palliative care team has discussed with patient / health care proxy about goals of care / treatment preferences for patient. HISTORY:  
 
History obtained from: chart CHIEF COMPLAINT: SOB 
 
HPI/SUBJECTIVE: The patient is:  
[] Verbal and participatory [x] Non-participatory due to: intubation 4/17: BIBA with c/o SOB that has progressively worsened over the past 2 days, satting 50% on RA at home and 70's on NRB. Now on CPAP 14 100%. Clinical Pain Assessment (nonverbal scale for severity on nonverbal patients):  
Clinical Pain Assessment Severity: 0 Activity (Movement): Lying quietly, normal position Duration: for how long has pt been experiencing pain (e.g., 2 days, 1 month, years) Frequency: how often pain is an issue (e.g., several times per day, once every few days, constant) FUNCTIONAL ASSESSMENT:  
 
Palliative Performance Scale (PPS): PPS: 10 PSYCHOSOCIAL/SPIRITUAL SCREENING:  
 
Palliative IDT has assessed this patient for cultural preferences / practices and a referral made as appropriate to needs (Cultural Services, Patient Advocacy, Ethics, etc.) Any spiritual / Moravian concerns: 
[] Yes /  [x] No 
 
Caregiver Burnout: 
[] Yes /  [x] No /  [] No Caregiver Present Anticipatory grief assessment:  
[x] Normal  / [] Maladaptive ESAS Anxiety: Anxiety: 0unable to assess due to pt factors ESAS Depression:   unable to assess due to pt factors REVIEW OF SYSTEMS:  
 
Positive and pertinent negative findings in ROS are noted above in HPI. The following systems were [x] reviewed / [] unable to be reviewed as noted in HPI Other findings are noted below. Systems: constitutional, ears/nose/mouth/throat, respiratory, gastrointestinal, genitourinary, musculoskeletal, integumentary, neurologic, psychiatric, endocrine. Positive findings noted below. Modified ESAS Completed by: provider Pain: 0 Anxiety: 0 Stool Occurrence(s): 1 PHYSICAL EXAM:  
 
From RN flowsheet: 
Wt Readings from Last 3 Encounters:  
04/26/21 253 lb 1.4 oz (114.8 kg) 04/07/21 272 lb 6.4 oz (123.6 kg) 03/31/21 273 lb (123.8 kg) Blood pressure (!) 123/52, pulse 93, temperature 98.6 °F (37 °C), resp. rate 29, height 5' 9\" (1.753 m), weight 253 lb 1.4 oz (114.8 kg), last menstrual period 09/07/2011, SpO2 92 %. Pain Scale 1: Behavioral Pain Scale (BPS) Pain Intensity 1: 3 Last bowel movement, if known: In order to limit the exposure risk from COVID 19 and to preserve the hospital's limited supply of PPEs, seen through ICU window. HISTORY:  
 
Active Problems: 
  COVID-19 (4/17/2021) Acute respiratory failure with hypoxia (HCC) () Fever, unspecified fever cause () Cough () Encounter for evaluation of ability to make decisions regarding care () Past Medical History:  
Diagnosis Date  Adverse effect of anesthesia   
 sleep apnea cpap machine useage  Anemia  Arthritis DDD low back and knees  Asthma   
 uses albuterol prn only; none x 6 mos. Never hospitalized  BRCA negative 2013  Calculus of kidney  Chronic kidney disease   
 kidney stones  Chronic pain   
 knee pain bilat  CINV (chemotherapy-induced nausea and vomiting) 2014  Decreased hearing, right PATIENT STATES THIS IS DUE TO CHEMOTHERAPY  Diabetes ([address] Age 46 IDDM  Environmental allergies  Fibromyalgia  GERD (gastroesophageal reflux disease)   
 controlled with med  Hx of pulmonary embolus during pregnancy 2015  Hydronephrosis due to obstruction of ureter 3/18/2019  Hypertension  Ill-defined condition Sepsis  -  SOURCE PORT  
 Ill-defined condition 2016  
 chemotherapy  Mass of chest wall 2018  Morbid obesity ([address]  Murmur, heart  Other and unspecified hyperlipidemia  Ovarian cancer ([address] 2012, 2014  
 serous, high grade, stage IIIc. s/p chemotx (has port)  Psychiatric disorder Depression/Anxiety  Rectal bleeding  Thromboembolus ([address]  POST PARTUM; resolved- PELVIS  Thyroid disease HYPOTHYROID  Unspecified sleep apnea CPAP, Dr. Yanelis Bello Past Surgical History:  
Procedure Laterality Date [place]  
  X2  
 HX HYSTERECTOMY  2012 ROULA/BSO, tumor debulking, omentectomy for ovarian cancer  HX ORTHOPAEDIC Right   
 ankle-FX, R  
 HX ORTHOPAEDIC Right   
 FX R ARM  
 HX UROLOGICAL Right  STENT FOR KIDNEY STONES  
 HX VASCULAR ACCESS  2015  
 right chest- port placed  HX VASCULAR ACCESS Left  TETE CATH  
 NH BREAST SURGERY PROCEDURE UNLISTED Lymph node in left breast 2017 Family History Problem Relation Age of Onset  Hypertension Mother Familia Arthritis-osteo Mother  Hypertension Father  Arthritis-osteo Father  Cancer Father PROSTATE  COPD Father  Hypertension Brother  Elevated Lipids Brother  Arthritis-osteo Brother  Hypertension Brother  Elevated Lipids Brother  Obesity Brother  Cancer Brother PROSTATE  Anesth Problems Son DELAYED AWAKENING  
 Diabetes Maternal Grandmother  Anesth Problems Paternal Grandmother PATIENT STATES GRANDMOTHER'S HEART STOPPED DURING SURGERY History reviewed, no pertinent family history. Social History Tobacco Use  Smoking status: Never Smoker  Smokeless tobacco: Never Used Substance Use Topics  Alcohol use: Yes Comment: 1 drink per month Allergies Allergen Reactions  Latex Hives  Carboplatin Other (comments) Patient developed shortness of breath, felt faint, coughing, skin flushed and \"itchy\". This occurred on the patients 8th treatment.  Iodinated Contrast Media Rash 13 hr pre-medication prior to IV contrast.  
Patient has done well with 1 hr pre-medication of (Solu-Medrol) 40mg &  (Benadryl) 50mg.  Lipitor [Atorvastatin] Myalgia  Shellfish Containing Products Hives  Tape [Adhesive] Itching and Other (comments) \"op site-- clear,thin tape\"--caused blister  Tomato Hives  Iodine And Iodide Containing Products Rash  Olmesartan Other (comments)  Losartan Other (comments)  
  headaches  Percocet [Oxycodone-Acetaminophen] Other (comments) Fever; however, current home med Current Facility-Administered Medications Medication Dose Route Frequency  piperacillin-tazobactam (ZOSYN) 3.375 g in 0.9% sodium chloride (MBP/ADV) 100 mL MBP  3.375 g IntraVENous Q12H  
 dextrose (D50W) injection syrg 12.5-25 g  12.5-25 g IntraVENous PRN  
 sodium zirconium cyclosilicate (LOKELMA) powder packet 10 g  10 g Oral NOW  acetaminophen (TYLENOL) tablet 650 mg  650 mg Per NG tube Q6H PRN Or  
 acetaminophen (TYLENOL) suppository 650 mg  650 mg Rectal Q6H PRN  
 0.9% sodium chloride infusion 250 mL  250 mL IntraVENous PRN  
 vasopressin (VASOSTRICT) 20 Units in 0.9% sodium chloride 100 mL infusion  0-0.04 Units/min IntraVENous TITRATE  heparin (porcine) injection 4,000 Units  4,000 Units IntraVENous PRN  
 heparin (porcine) injection 2,000 Units  2,000 Units IntraVENous PRN  
 NOREPINephrine (LEVOPHED) 32 mg in 5% dextrose 250 mL infusion  0.5-40 mcg/min IntraVENous TITRATE  chlorhexidine (ORAL CARE KIT) 0.12 % mouthwash 15 mL  15 mL Oral Q12H  propofol (DIPRIVAN) 10 mg/mL infusion  0-50 mcg/kg/min IntraVENous TITRATE  fentaNYL (PF) 1,500 mcg/30 mL (50 mcg/mL) infusion  0-300 mcg/hr IntraVENous TITRATE  sodium chloride (NS) flush 5-10 mL  5-10 mL IntraVENous PRN  
 sodium chloride (NS) flush 5-40 mL  5-40 mL IntraVENous Q8H  
 ondansetron (ZOFRAN) injection 4 mg  4 mg IntraVENous Q6H PRN  
 alcohol 62% (NOZIN) nasal  1 Ampule  1 Ampule Topical Q12H  
 
 
 
 LAB AND IMAGING FINDINGS:  
 
Lab Results Component Value Date/Time WBC 56.4 (HH) 04/27/2021 03:30 AM  
 HGB 7.7 (L) 04/27/2021 03:30 AM  
 PLATELET 795 (L) 70/68/5067 03:30 AM  
 
Lab Results Component Value Date/Time Sodium 131 (L) 04/27/2021 03:30 AM  
 Potassium 6.4 (H) 04/27/2021 03:30 AM  
 Chloride 101 04/27/2021 03:30 AM  
 CO2 19 (L) 04/27/2021 03:30 AM  
 BUN 89 (H) 04/27/2021 03:30 AM  
 Creatinine 5.30 (H) 04/27/2021 03:30 AM  
 Calcium 7.0 (L) 04/27/2021 03:30 AM  
 Magnesium 2.2 04/27/2021 03:30 AM  
 Phosphorus 10.7 (H) 04/27/2021 03:30 AM  
  
Lab Results Component Value Date/Time Alk. phosphatase 100 04/27/2021 03:30 AM  
 Protein, total 5.5 (L) 04/27/2021 03:30 AM  
 Albumin 1.9 (L) 04/27/2021 03:30 AM  
 Globulin 3.6 04/27/2021 03:30 AM  
 
Lab Results Component Value Date/Time  INR 1.1 04/03/2017 10:25 AM  
 Prothrombin time 11.1 04/03/2017 10:25 AM  
 aPTT 92.6 (HH) 04/27/2021 12:35 PM  
  
Lab Results Component Value Date/Time Iron 65 01/27/2021 01:14 PM  
 TIBC 293 01/27/2021 01:14 PM  
 Iron % saturation 22 01/27/2021 01:14 PM  
 Ferritin 5,851 (H) 04/27/2021 03:30 AM  
  
Lab Results Component Value Date/Time pH 7.21 (LL) 04/25/2021 04:02 AM  
 PCO2 54 (H) 04/25/2021 04:02 AM  
 PO2 106 (H) 04/25/2021 04:02 AM  
 
No components found for: [place] Lab Results Component Value Date/Time CK - MB 1.2 04/10/2018 07:33 PM  
  
 
 
   
 
Total time: 45  
Counseling / coordination time, spent as noted above: 30 
> 50% counseling / coordination?: y 
 
Prolonged service was provided for  []30 min   []75 min in face to face time in the presence of the patient, spent as noted above. Time Start:  
Time End:  
Note: this can only be billed with 23051 (initial) or 38441 (follow up). If multiple start / stop times, list each separately.

## 2021-04-28 NOTE — DISCHARGE SUMMARY
Admit date: 2021   Admitting Provider: Marichuy Garcia DO    Discharge date: 2021  Discharging Provider: Magali Conklin NP      * Admission Diagnoses: COVID-19 [U07.1]    * Discharge Diagnoses:  Septic shock with acute hypoxic respiratory failure 2/2 [facility] Problems as of 2021 Date Reviewed: 2021          Codes Class Noted - Resolved POA    Goals of care, counseling/discussion ICD-10-CM: Z71.89  ICD-9-CM: V65.49  2021 - Present Unknown        Acute respiratory failure with hypoxia (HCC) ICD-10-CM: J96.01  ICD-9-CM: 518.81  Unknown - Present Unknown        Fever, unspecified fever cause ICD-10-CM: R50.9  ICD-9-CM: 780.60  Unknown - Present Unknown        Cough ICD-10-CM: R05  ICD-9-CM: 786.2  Unknown - Present Unknown        Encounter for evaluation of ability to make decisions regarding care ICD-10-CM: Z02.79  ICD-9-CM: V68.09  Unknown - Present Unknown        COVID-19 ICD-10-CM: U07.1  ICD-9-CM: 079.89  2021 - Present Unknown              * Hospital Course: 62 y.o. with a past history of metastatic ovarian cancer on palliative (not curative) chemotherapy, retention, GERD, asthma, chronic pain, anemia diabetes, chronic kidney disease, depression and anxiety, who was admitted on 2021 from home with a diagnosis of COVID-19. Hosp c/c/b worsening hypoxic respiratory failure requiring intubation, anuric FELICIA- not a candidate for HD bc of her comorbid conditions, and worsening septic shock. Dr. Candace Elizabeth and palliative care had a [address] discussion with pt's son, Kelleen Ormond, pt made DNR. Called to bedside at 0034 bc pt became bradycardic and then went into asystole. Pt assessed and pronounced at [facility]. Pt's son, Kelleen Ormond, notified.         Discharge Exam:  Absent pulses, BP, and respirations, pupils fixed    * Discharge Condition:       Signed:  Magali Conklin NP  2021  12:47 AM .

## 2021-04-28 NOTE — PROGRESS NOTES
2300  Bedside and Verbal shift change report given to Xavier Nichols (oncoming nurse) by Daniel Patrick RN (offgoing nurse). Report included the following information SBAR, Kardex, MAR, Recent Results and Cardiac Rhythm SR with widening QRS and peaked T waves. Patient incontinent of brown/maroon stool. Patient with brisk bleeding from IV site in left [facility] and around right IJ CVC. Childers discontinued as patient is anuric. No cough/gag with suctioning and mouth care. Patient with agonal breaths despite rate given by ventilator. No response to stimuli. Patient is a DNR. Will not escalate care at this time. Patient with poor perfusion and it is difficult to get sats on her. BP is also difficult to obtain consistently. Maxed on Levo and Vasopressin. Continues with fentanyl gtt at 300 mcg/hr per order. 0034  Noted ~0020 that patient's QRS has widened significantly. NP called and notified that patient's status is changing. Patient briefly went into a wide complex bradycardia and then asystole. Time of death 5.    36 Supervisor notified, Jeremy Mccloud called and patient released from donation due to [place] and await  to come to bedside. 0100  Spoke with Ragini Banks NP. She updated the family. Await .     0330  Post mortem care completed and patient transferred to [facility] with technicians,

## 2021-04-28 NOTE — PROGRESS NOTES
Responded to patient death on CCU. Consulted with RN, no family present or coming. Offered a moment of silence for her life outside patient's room. Rev.  Chaplain Travis  Paging Service: [phone])

## 2021-04-28 NOTE — DEATH NOTE
Death Pronouncement Note    Patient's Name: Teresa Kuhn   Patient's YOB: 1960  MRN Number: [id number]    Admitting Provider: Monika Granda DO  Attending Provider: Shanon Mcconnell MD     Patient was examined and the following were absent: Pulses, Blood Pressure and Respiratory effort    I declared the patient dead on 4/28/2021 at 12:34 AM    Preliminary Cause of Death: Sepsis due to COVID-19 [facility])    Electronically signed by Aristeo Mays NP on 4/28/2021 at 12:42 AM

## 2021-04-28 NOTE — PROGRESS NOTES
Facilitated conference call between family/friends (including Divya Self and German Robbins) and pt so that they could talk to her and pray for her.

## 2021-05-05 ENCOUNTER — APPOINTMENT (OUTPATIENT)
Dept: INFUSION THERAPY | Age: 61
End: 2021-05-05

## 2021-08-03 NOTE — ANESTHESIA PREPROCEDURE EVALUATION
Anesthetic History     PONV          Review of Systems / Medical History  Patient summary reviewed, nursing notes reviewed and pertinent labs reviewed    Pulmonary        Sleep apnea    Asthma        Neuro/Psych         Psychiatric history     Cardiovascular    Hypertension          CAD         GI/Hepatic/Renal     GERD           Endo/Other    Diabetes  Hypothyroidism  Morbid obesity and arthritis     Other Findings              Physical Exam    Airway  Mallampati: II  TM Distance: > 6 cm  Neck ROM: normal range of motion   Mouth opening: Normal     Cardiovascular  Regular rate and rhythm,  S1 and S2 normal,  no murmur, click, rub, or gallop             Dental  No notable dental hx       Pulmonary  Breath sounds clear to auscultation               Abdominal  GI exam deferred       Other Findings            Anesthetic Plan    ASA: 3  Anesthesia type: MAC          Induction: Intravenous  Anesthetic plan and risks discussed with: Patient Thank you for choosing our [facility] or [facility]. We are committed to your medical treatment and  care. If you need to reschedule or cancel your surgery or follow up  appointment, please call the surgery scheduler at [phone]. INSTRUCTIONS FOR SURGERY T&A    Nothing to eat or drink after midnight the night before surgery unless surgery is at [facility] or otherwise instructed by the hospital.    DO NOT TAKE ANY ASPIRIN PRODUCTS 7 days prior to surgery-unless required by your cardiologist or primary care physician. Tylenol only. No Advil, Motrin, Aleve, or Ibuprofen    Any illegal drugs in your system (including Marijuana even if legally prescribed) will result in your surgery being cancelled. Please be sure to check with our office or the hospital on time frame for the drugs to be out of your system. Should your insurance change at any time you must contact our office. Failure to do so may result in your surgery being rescheduled. If you need paperwork filled out for work, you must give the office 2 weeks to complete and submit the forms. [address] [facility])[place] , [facility] will call you the day prior to your surgery and give you further instructions, if any questions call them at [phone].      ? Pre-Surgery/Anesthesia Video ([facility] ONLY)  Located on [facility]  Steps to locate video online:  1. Scroll over Health Information  1. Select Audio and Video  2. Select [employer]  1. Your Child and Anesthesia  2.  [address] [facility] [place] ([facility] ONLY)   Food Type Stop Prior to Surgery   Solid Food/Milk Products 8 Hours   Formula 6 Hours   Breast Milk 4 Hours   Clear Liquids   (Water, Gatorade, Pedialtye) 2 Hours

## 2022-09-25 NOTE — PROGRESS NOTES
Care Management: 
 
Transition of Care Plan: 
 
RUR:29 Disposition:home with son, ? HH. Follow up appointments: PCP and specialists as indicated. DME needed: Has rolling walker, cane and CPAP at home. Transportation at Discharge: Josea Jimbo [place] or means to access home:  Alverta Barbiee IM Medicare letter: Will need second IM letter. Caregiver Contact:Alec Discharge Caregiver contacted prior to discharge? Reason for Admission:   Covid and in ICU requiring CPAP. She has a hx if ovarian cancer since 2012 and she has mets. Has done chemo and is now on a maintenance chemo. She has a rocio cath. Also a hx of CKD and GERD. RUR Score:    29 PCP: First and Last name:  Robinson Solano MD 
 
 Name of Practice: 
 Are you a current patient: Yes Approximate date of last visit: son not sure but says she is active with PCP. Can you do a virtual visit with your PCP: YES Resources/supports as identified by patient/family:  Family is supportive Top Challenges facing patient (as identified by patient/family and CM): Finances/Medication cost?       
          Has her insurance with medication coverage. Transportation? Eugene Choi Support system or lack thereof? Son Eugene Choi Living arrangements? Lives in her own home and her son lives with her. Self-care/ADL's/Cognition? At baseline she is independent with ADL's . She is active including driving. Current Advanced Directive/Advance Care Plan:  Full Code Healthcare Decision Maker: Cindy Bishop is her decision maker/ son Payor Source Payor: Janet Palacios / Plan: [address] [place] [address] HMO / Product Type: Managed Care Medicare /  
 
  Plan for utilizing home health:   TBD Care Management Interventions PCP Verified by CM: Yes 
Palliative Care Criteria Met (RRAT>21 & CHF Dx)?: Yes Current Support Network: Own Home(Lives in her own home and her son Maura Melgar lives with her. ) Confirm Follow Up Transport: Family(son) Discharge Location Discharge Placement: Home with family assistance I spoke with son Maura Melgar on the phone and able to confirm demographics. Son lives with patient and assists as needed. Patient is independent with ADL's at baseline including driving. She has a cane, rolling walker and CPAP at home. Her brother passed last week from Covid. Stephani Munroe RN ACM [id number] Statement Selected

## 2023-05-17 NOTE — PROGRESS NOTES
Refill Routing Note   Medication(s) are not appropriate for processing by [facility] for the following reason(s):      Required labs outdated    [facility] action(s):  Defer None identified            Appointments  past 12m or future 3m with PCP    Date Provider   Last Visit   2/9/2023 Alonso Cortez MD   Next Visit   Visit date not found Alonso Cortez MD   ED visits in past 90 days: 0        Note composed:4:07 PM 05/17/2023           one month follow up for ovarian cancer, on [place] [facility], virtual visit, no vital taken due to phone call visit, labs were drawn on 4/16/2020 1. Have you been to the ER, urgent care clinic since your last visit? Hospitalized since your last visit?  no 
 
2. Have you seen or consulted any other health care providers outside of the [address] [place] [address] since your last visit? Include any pap smears or colon screening.    no

## 2023-07-19 NOTE — IP AVS SNAPSHOT
[address] [facility] 
[phone] Patient: Colletta Masson MRN: [id number] :1960 About your hospitalization You were admitted on:  April 10, 2018 You last received care in the:  [facility] 2 GENERAL SURGERY You were discharged on:  2018 Why you were hospitalized Your primary diagnosis was:  Not on File Your diagnoses also included:  Chest Mass, Chest Wall Mass Follow-up Information Follow up With Details Comments Contact Info Nazario Monroy MD In 1 week  [facility] [address] [facility] 
[phone] Bhavesh Iraheta MD  As planned [address] [facility] Professor [address] [facility] [address] 
[phone] Monika Osullivan MD In 1 week  [address] [facility] 
[phone] Your Scheduled Appointments 2018 11:00 AM EDT  
3 MONTH with Bhavesh Iraheta MD  
[address] [facility] 36 [facility]) [address] [facility] [address] [id number]  
[phone] Discharge Orders None A check chikis indicates which time of day the medication should be taken. My Medications START taking these medications Instructions Each Dose to Equal  
 Morning Noon Evening Bedtime  
 naloxone 2 mg/0.4 mL auto-injector Commonly known as:  Duane Curtis Your last dose was: Your next dose is: 0.4 mL by IntraMUSCular route once as needed for Overdose for up to 1 dose. 2 mg CHANGE how you take these medications Instructions Each Dose to Equal  
 Morning Noon Evening Bedtime  
 fluticasone 50 mcg/actuation nasal spray Commonly known as:  Leandro Nash What changed:  Another medication with the same name was removed. Continue taking this medication, and follow the directions you see here. Labs in [address] [place] [address] Your last dose was: Your next dose is: 2 Sprays by Both Nostrils route daily. 2 Spray CONTINUE taking these medications Instructions Each Dose to Equal  
 Morning Noon Evening Bedtime * albuterol 90 mcg/actuation inhaler Commonly known as:  PROVENTIL HFA, VENTOLIN HFA, PROAIR HFA Your last dose was: Your next dose is: Take 2 Puffs by inhalation every four (4) hours as needed for Wheezing. 2 Puff * albuterol 2.5 mg /3 mL (0.083 %) nebulizer solution Commonly known as:  PROVENTIL VENTOLIN Your last dose was: Your next dose is:    
   
   
 USE 1 VAIL VIA NEBULIZER EVERY 4 HOURS AS NEEDED FOR WHEEZING  
     
   
   
   
  
 ALLEGRA 180 mg tablet Generic drug:  fexofenadine Your last dose was: Your next dose is: Take 180 mg by mouth daily. 180 mg  
    
   
   
   
  
 aspirin delayed-release 81 mg tablet Your last dose was: Your next dose is: Take  by mouth daily. BD INSULIN PEN NEEDLE UF SHORT 31 gauge x 5/16\" Ndle Generic drug:  Insulin Needles (Disposable) Your last dose was: Your next dose is:    
   
   
 USE WITH INSULIN TO INJECT FIVE TIMES DAILY. cholecalciferol (VITAMIN D3) 5,000 unit Tab tablet Commonly known as:  VITAMIN D3 Your last dose was: Your next dose is: Take 5,000 Units by mouth daily. 5000 Units  
    
   
   
   
  
 clonazePAM 0.5 mg tablet Commonly known as:  Adrienne Hack Your last dose was: Your next dose is: Take 0.5 mg by mouth nightly as needed. 0.5 mg Dexlansoprazole 60 mg Cpdb Commonly known as:  DEXILANT Your last dose was: Your next dose is: Take 1 Cap by mouth daily.   
 60 mg  
    
   
   
   
  
 diphenhydrAMINE 25 mg capsule Commonly known as:  BENADRYL Your last dose was: Your next dose is: Take 25 mg by mouth as needed. 25 mg  
    
   
   
   
  
 furosemide 40 mg tablet Commonly known as:  LASIX Your last dose was: Your next dose is: Take 80 mg by mouth as needed. 80 mg  
    
   
   
   
  
 * HYDROcodone-acetaminophen 5-325 mg per tablet Commonly known as:  Cary No Your last dose was: Your next dose is: Take 1 Tab by mouth every four (4) hours as needed for Pain. Max Daily Amount: 6 Tabs. 1 Tab  
    
   
   
   
  
 * HYDROcodone-acetaminophen 5-325 mg per tablet Commonly known as:  Cary No Your last dose was: Your next dose is: Take 1 Tab by mouth every four (4) hours as needed for Pain. Max Daily Amount: 6 Tabs. 1 Tab  
    
   
   
   
  
 insulin aspart U-100 100 unit/mL Inpn Commonly known as:  NovoLOG Flexpen U-100 Insulin Your last dose was: Your next dose is: INJECT 20 UNITS BEFORE EACH MEAL + 4 UNITS FOR EVERY 50 MG/DL ABOVE 150 MG/DL.  UNITS PER DAY  
     
   
   
   
  
 insulin detemir U-100 100 unit/mL (3 mL) Inpn Commonly known as:  LEVEMIR FLEXTOUCH U-100 INSULN Your last dose was: Your next dose is: INJECT 30 UNITS IN AM AND 50 UNITS AT NIGHT. INCREASE AS DIRECTED TO  UNITS PER DAY--CALL [phone] FOR APPOINTMENT FOR MORE REFILLS  
     
   
   
   
  
 levothyroxine 150 mcg tablet Commonly known as:  SYNTHROID Your last dose was: Your next dose is: TAKE 1 TAB BY MOUTH DAILY (BEFORE BREAKFAST). --AMKM [phone] FOR APPOINTMENT FOR MORE REFILLS Liraglutide 0.6 mg/0.1 mL (18 mg/3 mL) Pnij Commonly known as:  Kathy Torres Your last dose was: Your next dose is:    
   
   
 1.8 mg by SubCUTAneous route daily (with lunch).   
 1.8 mg  
 montelukast 10 mg tablet Commonly known as:  SINGULAIR Your last dose was: Your next dose is: TAKE 1 TAB BY MOUTH DAILY. nystatin powder Commonly known as:  MYCOSTATIN Your last dose was: Your next dose is:    
   
   
 Apply  to affected area four (4) times daily as needed. omega-3 acid ethyl esters 1 gram capsule Commonly known as:  Derek Johnson Your last dose was: Your next dose is: TAKE ONE CAPSULE BY MOUTH TWICE A DAY  
     
   
   
   
  
 ondansetron 8 mg disintegrating tablet Commonly known as:  ZOFRAN ODT Your last dose was: Your next dose is: Take 1 Tab by mouth every eight (8) hours as needed for Nausea. 8 mg OTHER(NON-FORMULARY) Your last dose was: Your next dose is:    
   
   
 2 Tabs daily (after dinner). [facility] Promise (vitamin) 2 Tab  
    
   
   
   
  
 potassium chloride 10 mEq tablet Commonly known as:  KLOR-CON M10 Your last dose was: Your next dose is: TAKE 1 TAB BY MOUTH TWO (2) DAYS A WEEK. WED AND FRI  
     
   
   
   
  
 RESTASIS 0.05 % ophthalmic emulsion Generic drug:  cycloSPORINE Your last dose was: Your next dose is:    
   
   
 1 Drop two (2) times a day. 1 Drop  
    
   
   
   
  
 sertraline 50 mg tablet Commonly known as:  ZOLOFT Your last dose was: Your next dose is: TAKE 1 TABLET BY MOUTH EVERY MORNING  
     
   
   
   
  
 SYMBICORT 160-4.5 mcg/actuation Hfaa Generic drug:  budesonide-formoterol Your last dose was: Your next dose is: Take 2 Puffs by inhalation two (2) times daily as needed. 2 Puff  
    
   
   
   
  
 valsartan 80 mg tablet Commonly known as:  DIOVAN Your last dose was: Your next dose is: Take 1 Tab by mouth daily. 80 mg  
    
   
   
   
  
 * Notice: This list has 4 medication(s) that are the same as other medications prescribed for you. Read the directions carefully, and ask your doctor or other care provider to review them with you. Where to Get Your Medications Information on where to get these meds will be given to you by the nurse or doctor. ! Ask your nurse or doctor about these medications HYDROcodone-acetaminophen 5-325 mg per tablet  
 naloxone 2 mg/0.4 mL auto-injector Opioid Education Prescription Opioids: What You Need to Know: 
 
Prescription opioids can be used to help relieve moderate-to-severe pain and are often prescribed following a surgery or injury, or for certain health conditions. These medications can be an important part of treatment but also come with serious risks. Opioids are strong pain medicines. Examples include hydrocodone, oxycodone, fentanyl, and morphine. Heroin is an example of an illegal opioid. It is important to work with your health care provider to make sure you are getting the safest, most effective care. WHAT ARE THE RISKS AND SIDE EFFECTS OF OPIOID USE? Prescription opioids carry serious risks of addiction and overdose, especially with prolonged use. An opioid overdose, often marked by slow breathing, can cause sudden death. The use of prescription opioids can have a number of side effects as well, even when taken as directed. · Tolerance-meaning you might need to take more of a medication for the same pain relief · Physical dependence-meaning you have symptoms of withdrawal when the medication is stopped. Withdrawal symptoms can include nausea, sweating, chills, diarrhea, stomach cramps, and muscle aches. Withdrawal can last up to several weeks, depending on which drug you took and how long you took it. · Increased sensitivity to pain · Constipation · Nausea, vomiting, and dry mouth · Sleepiness and dizziness · Confusion · Depression · Low levels of testosterone that can result in lower sex drive, energy, and strength · Itching and sweating RISKS ARE GREATER WITH:      
· History of drug misuse, substance use disorder, or overdose · Mental health conditions (such as depression or anxiety) · Sleep apnea · Older age (72 years or older) · Pregnancy Avoid alcohol while taking prescription opioids. Also, unless specifically advised by your health care provider, medications to avoid include: · Benzodiazepines (such as Xanax or Valium) · Muscle relaxants (such as Soma or Flexeril) · Hypnotics (such as Ambien or Lunesta) · Other prescription opioids KNOW YOUR OPTIONS Talk to your health care provider about ways to manage your pain that don't involve prescription opioids. Some of these options may actually work better and have fewer risks and side effects. Options may include: 
· Pain relievers such as acetaminophen, ibuprofen, and naproxen · Some medications that are also used for depression or seizures · Physical therapy and exercise · Counseling to help patients learn how to cope better with triggers of pain and stress. · Application of heat or cold compress · Massage therapy · Relaxation techniques Be Informed Make sure you know the name of your medication, how much and how often to take it, and its potential risks & side effects. IF YOU ARE PRESCRIBED OPIOIDS FOR PAIN: 
· Never take opioids in greater amounts or more often than prescribed. Remember the goal is not to be pain-free but to manage your pain at a tolerable level. · Follow up with your primary care provider to: · Work together to create a plan on how to manage your pain. · Talk about ways to help manage your pain that don't involve prescription opioids. · Talk about any and all concerns and side effects. · Help prevent misuse and abuse. · Never sell or share prescription opioids · Help prevent misuse and abuse. · Store prescription opioids in a secure place and out of reach of others (this may include visitors, children, friends, and family). · Safely dispose of unused/unwanted prescription opioids: Find your community drug take-back program or your pharmacy mail-back program, or flush them down the toilet, following guidance from the Food and Drug Administration (www.fda.gov/Drugs/ResourcesForYou). · Visit www.cdc.gov/drugoverdose to learn about the risks of opioid abuse and overdose. · If you believe you may be struggling with addiction, tell your health care provider and ask for guidance or call [facility] at 8-542-168-ZYOF. Discharge Instructions PATIENT DISCHARGE INSTRUCTIONS PATIENT DISCHARGE INSTRUCTIONS Dot Merazescobar / [id number] : 1960 Admitted 4/10/2018 Discharged: 2018 · It is important that you take the medication exactly as they are prescribed. · Keep your medication in the bottles provided by the pharmacist and keep a list of the medication names, dosages, and times to be taken in your wallet. · Do not take other medications without consulting your doctor. What to do at [facility] Recommended Diet: Resume previous diet Recommended Activity: Activity as tolerated Signed By: Jocelyn Steele MD   
 2018 [employer] Announcement We are excited to announce that we are making your provider's discharge notes available to you in Tran[employer]. You will see these notes when they are completed and signed by the physician that discharged you from your recent hospital stay. If you have any questions or concerns about any information you see in HaulerDealst, please call the Health Information Department where you were seen or reach out to your Primary Care Provider for more information about your plan of care. Introducing [facility]! Dear Radha January: Thank you for requesting a [employer] account. Our records indicate that you already have an active [employer] account. You can access your account anytime at [email or web address] Did you know that you can access your hospital and ER discharge instructions at any time in [employer]? You can also review all of your test results from your hospital stay or ER visit. Additional Information If you have questions, please visit the Frequently Asked Questions section of the [employer] website at [email or web address] Remember, [employer] Software is NOT to be used for urgent needs. For medical emergencies, dial 911. Now available from your iPhone and Android! Introducing Chau Evans As a [facility] patient, I wanted to make you aware of our electronic visit tool called Chau Iggychandrafin. [facility] Zytoprotec/Advanova allows you to connect within minutes with a medical provider 24 hours a day, seven days a week via a mobile device or tablet or logging into a secure website from your computer. You can access Chau Evans from anywhere in the [place]. A virtual visit might be right for you when you have a simple condition and feel like you just dont want to get out of bed, or cant get away from work for an appointment, when your regular [facility] provider is not available (evenings, weekends or holidays), or when youre out of town and need minor care. Electronic visits cost only $49 and if the [facility] Zytoprotec/Advanova provider determines a prescription is needed to treat your condition, one can be electronically transmitted to a nearby pharmacy*. Please take a moment to enroll today if you have not already done so. The enrollment process is free and takes just a few minutes. To enroll, please download the Baoku/Advanova pat to your tablet or phone, or visit [email or web address] to enroll on your computer. And, as an [address] [place] [address] patient with a [facility] account, the results of your visits will be scanned into your electronic medical record and your primary care provider will be able to view the scanned results. We urge you to continue to see your regular [facility] provider for your ongoing medical care. And while your primary care provider may not be the one available when you seek a Chau Shahfin virtual visit, the peace of mind you get from getting a real diagnosis real time can be priceless. For more information on Chau DrivenBIchandrafin, view our Frequently Asked Questions (FAQs) at [email or web address]. Sincerely, 
 
Joan Parra MD 
Chief Medical Officer Nettie Cr *:  certain medications cannot be prescribed via [facility] Unresulted Labs-Please follow up with your PCP about these lab tests Order Current Status EKG, 12 LEAD, INITIAL Preliminary result Providers Seen During Your Hospitalization Provider Specialty Primary office phone Ever Rodriguez MD Emergency Medicine [phone] Venus Dallas MD Internal Medicine [phone] Your Primary Care Physician (PCP) Primary Care Physician Office Phone Office Fax Amador Real [phone] You are allergic to the following Allergen Reactions Iodinated Contrast- Oral And Iv Dye Rash 13 hr pre-medication prior to IV contrast  
    
 Carboplatin Other (comments) Patient developed shortness of breath, felt faint, coughing, skin flushed and \"itchy\". This occurred on the patients 8th treatment. Lipitor (Atorvastatin) Myalgia Percocet (Oxycodone-Acetaminophen) Other (comments) fever Shellfish Containing Products Hives Tape (Adhesive) Itching Other (comments) \"op site-- clear,thin tape\"--caused blister Tomato Hives Recent Documentation Height Weight BMI OB Status Smoking Status 1.753 m 140.6 kg 45.78 kg/m2 Hysterectomy Never Smoker Emergency Contacts Name Discharge Info Relation Home Work Mobile [facility] Ian CAREGIVER [3] Son [22] [phone] Patient Belongings The following personal items are in your possession at time of discharge: 
  Dental Appliances: None  Visual Aid: Glasses      Home Medications: None   Jewelry: Ring, Bracelet, Earrings, Necklace, With patient  Clothing: At bedside, Shirt, Undergarments, Pants, Footwear    Other Valuables: Cell Phone, Other (comment), At bedside (CPAP) Please provide this summary of care documentation to your next provider. Signatures-by signing, you are acknowledging that this After Visit Summary has been reviewed with you and you have received a copy. Patient Signature:  ____________________________________________________________ Date:  ____________________________________________________________  
  
Natalie Manjarrez Provider Signature:  ____________________________________________________________ Date:  ____________________________________________________________

## (undated) DEVICE — BENTSON WIRE GUIDE 20CM DISTAL FLEXIBILITY WITH SOFTENED TIP: Brand: BENTSON

## (undated) DEVICE — SUTURE VCRL SZ 2-0 L27IN ABSRB UD L26MM SH 1/2 CIR J417H

## (undated) DEVICE — TUBING IRRIG L96IN DIA0.241IN L BOR T-U-R W/ NVENT PIERCING

## (undated) DEVICE — REM POLYHESIVE ADULT PATIENT RETURN ELECTRODE: Brand: VALLEYLAB

## (undated) DEVICE — INFECTION CONTROL KIT SYS

## (undated) DEVICE — Z DISCONTINUEDSOLUTION PREP 2OZ 10% POVIDONE IOD SCR CAP BTL

## (undated) DEVICE — SOLUTION IRRIG 1000ML H2O STRL BLT

## (undated) DEVICE — CYSTOSCOPY PACK: Brand: CONVERTORS

## (undated) DEVICE — (D)SYR 10ML 1/5ML GRAD NSAF -- PKGING CHANGE USE ITEM 338027

## (undated) DEVICE — SUTURE PROL SZ 2-0 L36IN NONABSORBABLE BLU SH L26MM 1/2 CIR 8523H

## (undated) DEVICE — SOLUTION IRRIGATION H2O 0797305] ICU MEDICAL INC]

## (undated) DEVICE — DEVON™ KNEE AND BODY STRAP 60" X 3" (1.5 M X 7.6 CM): Brand: DEVON

## (undated) DEVICE — 3000CC GUARDIAN II: Brand: GUARDIAN

## (undated) DEVICE — Device

## (undated) DEVICE — MEDI-VAC NON-CONDUCTIVE SUCTION TUBING: Brand: CARDINAL HEALTH

## (undated) DEVICE — JELLY,LUBE,STERILE,FLIP TOP,TUBE,4-OZ: Brand: MEDLINE

## (undated) DEVICE — DRAPE,LAPAROTOMY,PED,STERILE: Brand: MEDLINE

## (undated) DEVICE — HANDLE LT SNAP ON ULT DURABLE LENS FOR TRUMPF ALC DISPOSABLE

## (undated) DEVICE — GAUZE SPONGES,8 PLY: Brand: CURITY

## (undated) DEVICE — GOWN,SIRUS,NONRNF,SETINSLV,XL,20/CS: Brand: MEDLINE

## (undated) DEVICE — SYR IRR CATH TIP LR ADPT 70ML -- CONVERT TO ITEM 363120

## (undated) DEVICE — SYR 10ML LUER LOK 1/5ML GRAD --

## (undated) DEVICE — UNDER BUTTOCKS DRAPE: Brand: CONVERTORS

## (undated) DEVICE — 3M™ TEGADERM™ TRANSPARENT FILM DRESSING FRAME STYLE, 1626W, 4 IN X 4-3/4 IN (10 CM X 12 CM), 50/CT 4CT/CASE: Brand: 3M™ TEGADERM™

## (undated) DEVICE — 3M™ STERI-STRIP™ REINFORCED ADHESIVE SKIN CLOSURES, R1546, 1/4 IN X 4 IN (6 MM X 100 MM), 10 STRIPS/ENVELOPE: Brand: 3M™ STERI-STRIP™

## (undated) DEVICE — SKIN MARKER,REGULAR TIP WITH RULER AND LABELS: Brand: DEVON

## (undated) DEVICE — DRAPE XR C ARM 41X74IN LF --

## (undated) DEVICE — NITINOL WIRE WITH HYDROPHILIC TIP: Brand: SENSOR

## (undated) DEVICE — GAUZE SPONGES,12 PLY: Brand: CURITY

## (undated) DEVICE — KIT INFECTION CTRL ST FRAN --

## (undated) DEVICE — NEEDLE HYPO 25GA L1.5IN BVL ORIENTED ECLIPSE

## (undated) DEVICE — SOLUTION IV 500ML 0.9% SOD CHL FLX CONT

## (undated) DEVICE — DERMABOND SKIN ADH 0.7ML -- DERMABOND ADVANCED 12/BX

## (undated) DEVICE — IRRIGATION KT CYSTO/TUR 10ML -- BX/5 720-100

## (undated) DEVICE — TOWEL SURG W17XL27IN STD BLU COT NONFENESTRATED PREWASHED

## (undated) DEVICE — DRAPE,LAPAROTOMY,T,PEDI,STERILE: Brand: MEDLINE

## (undated) DEVICE — TRAY PREP DRY W/ PREM GLV 2 APPL 6 SPNG 2 UNDPD 1 OVERWRAP

## (undated) DEVICE — STERILE POLYISOPRENE POWDER-FREE SURGICAL GLOVES: Brand: PROTEXIS

## (undated) DEVICE — GOWN,PLEAT,SPECIALTY,XL,STRL: Brand: MEDLINE

## (undated) DEVICE — STERILE LATEX POWDER-FREE SURGICAL GLOVESWITH NITRILE COATING: Brand: PROTEXIS

## (undated) DEVICE — SOLUTION IRRIG 3000ML 0.9% SOD CHL FLX CONT 0797208] ICU MEDICAL INC]

## (undated) DEVICE — BAG COLLECTION FLD OR-TBL NS --

## (undated) DEVICE — GUIDEWIRE URO L150CM DIA0.035IN STD NIT HYDRPHLC STR TIP

## (undated) DEVICE — TUBING IRRIG L77IN DIA0.241IN L BOR FOR CYSTO W/ NVENT

## (undated) DEVICE — KENDALL SCD EXPRESS SLEEVES, KNEE LENGTH, MEDIUM: Brand: KENDALL SCD

## (undated) DEVICE — SUTURE MCRYL SZ 4-0 L27IN ABSRB UD L19MM PS-2 1/2 CIR PRIM Y426H